# Patient Record
Sex: FEMALE | Race: WHITE | NOT HISPANIC OR LATINO | Employment: FULL TIME | URBAN - METROPOLITAN AREA
[De-identification: names, ages, dates, MRNs, and addresses within clinical notes are randomized per-mention and may not be internally consistent; named-entity substitution may affect disease eponyms.]

---

## 2018-02-07 ENCOUNTER — HOSPITAL ENCOUNTER (INPATIENT)
Facility: CLINIC | Age: 33
LOS: 2 days | Discharge: HOME | End: 2018-02-09
Attending: EMERGENCY MEDICINE | Admitting: PSYCHIATRY & NEUROLOGY
Payer: COMMERCIAL

## 2018-02-07 DIAGNOSIS — R45.851 SUICIDAL THOUGHTS: ICD-10-CM

## 2018-02-07 DIAGNOSIS — F33.2 SEVERE RECURRENT MAJOR DEPRESSION WITHOUT PSYCHOTIC FEATURES (H): Primary | ICD-10-CM

## 2018-02-07 PROBLEM — F32.A DEPRESSION: Status: ACTIVE | Noted: 2018-02-07

## 2018-02-07 LAB
ALBUMIN SERPL-MCNC: 3.9 G/DL (ref 3.4–5)
ALP SERPL-CCNC: 70 U/L (ref 40–150)
ALT SERPL W P-5'-P-CCNC: 24 U/L (ref 0–50)
AMPHETAMINES UR QL SCN: NEGATIVE
ANION GAP SERPL CALCULATED.3IONS-SCNC: 9 MMOL/L (ref 3–14)
APAP SERPL-MCNC: <2 MG/L (ref 10–20)
AST SERPL W P-5'-P-CCNC: 22 U/L (ref 0–45)
BARBITURATES UR QL: NEGATIVE
BASOPHILS # BLD AUTO: 0 10E9/L (ref 0–0.2)
BASOPHILS NFR BLD AUTO: 0.4 %
BENZODIAZ UR QL: NEGATIVE
BILIRUB SERPL-MCNC: 0.3 MG/DL (ref 0.2–1.3)
BUN SERPL-MCNC: 13 MG/DL (ref 7–30)
CALCIUM SERPL-MCNC: 9 MG/DL (ref 8.5–10.1)
CANNABINOIDS UR QL SCN: NEGATIVE
CHLORIDE SERPL-SCNC: 109 MMOL/L (ref 94–109)
CO2 SERPL-SCNC: 23 MMOL/L (ref 20–32)
COCAINE UR QL: NEGATIVE
CREAT SERPL-MCNC: 0.78 MG/DL (ref 0.52–1.04)
DIFFERENTIAL METHOD BLD: NORMAL
EOSINOPHIL # BLD AUTO: 0.1 10E9/L (ref 0–0.7)
EOSINOPHIL NFR BLD AUTO: 0.6 %
ERYTHROCYTE [DISTWIDTH] IN BLOOD BY AUTOMATED COUNT: 14.2 % (ref 10–15)
GFR SERPL CREATININE-BSD FRML MDRD: 84 ML/MIN/1.7M2
GLUCOSE SERPL-MCNC: 91 MG/DL (ref 70–99)
HCG UR QL: NEGATIVE
HCT VFR BLD AUTO: 38 % (ref 35–47)
HGB BLD-MCNC: 12.5 G/DL (ref 11.7–15.7)
IMM GRANULOCYTES # BLD: 0 10E9/L (ref 0–0.4)
IMM GRANULOCYTES NFR BLD: 0.1 %
LYMPHOCYTES # BLD AUTO: 1.3 10E9/L (ref 0.8–5.3)
LYMPHOCYTES NFR BLD AUTO: 14 %
MCH RBC QN AUTO: 29.3 PG (ref 26.5–33)
MCHC RBC AUTO-ENTMCNC: 32.9 G/DL (ref 31.5–36.5)
MCV RBC AUTO: 89 FL (ref 78–100)
MONOCYTES # BLD AUTO: 0.7 10E9/L (ref 0–1.3)
MONOCYTES NFR BLD AUTO: 6.9 %
NEUTROPHILS # BLD AUTO: 7.5 10E9/L (ref 1.6–8.3)
NEUTROPHILS NFR BLD AUTO: 78 %
NRBC # BLD AUTO: 0 10*3/UL
NRBC BLD AUTO-RTO: 0 /100
OPIATES UR QL SCN: NEGATIVE
PCP UR QL SCN: NEGATIVE
PLATELET # BLD AUTO: 317 10E9/L (ref 150–450)
POTASSIUM SERPL-SCNC: 3.9 MMOL/L (ref 3.4–5.3)
PROT SERPL-MCNC: 8 G/DL (ref 6.8–8.8)
RBC # BLD AUTO: 4.27 10E12/L (ref 3.8–5.2)
SALICYLATES SERPL-MCNC: <2 MG/DL
SODIUM SERPL-SCNC: 141 MMOL/L (ref 133–144)
WBC # BLD AUTO: 9.6 10E9/L (ref 4–11)

## 2018-02-07 PROCEDURE — 81025 URINE PREGNANCY TEST: CPT | Performed by: EMERGENCY MEDICINE

## 2018-02-07 PROCEDURE — 84443 ASSAY THYROID STIM HORMONE: CPT | Performed by: EMERGENCY MEDICINE

## 2018-02-07 PROCEDURE — 80307 DRUG TEST PRSMV CHEM ANLYZR: CPT | Performed by: EMERGENCY MEDICINE

## 2018-02-07 PROCEDURE — 80053 COMPREHEN METABOLIC PANEL: CPT | Performed by: EMERGENCY MEDICINE

## 2018-02-07 PROCEDURE — 80329 ANALGESICS NON-OPIOID 1 OR 2: CPT | Performed by: EMERGENCY MEDICINE

## 2018-02-07 PROCEDURE — 85025 COMPLETE CBC W/AUTO DIFF WBC: CPT | Performed by: EMERGENCY MEDICINE

## 2018-02-07 PROCEDURE — 90791 PSYCH DIAGNOSTIC EVALUATION: CPT

## 2018-02-07 PROCEDURE — 99285 EMERGENCY DEPT VISIT HI MDM: CPT | Mod: 25

## 2018-02-07 PROCEDURE — 12400006 ZZH R&B MH INTERMEDIATE

## 2018-02-07 RX ORDER — MECLIZINE HYDROCHLORIDE 25 MG/1
25 TABLET ORAL 3 TIMES DAILY PRN
Status: DISCONTINUED | OUTPATIENT
Start: 2018-02-07 | End: 2018-02-09 | Stop reason: HOSPADM

## 2018-02-07 RX ORDER — TRAZODONE HYDROCHLORIDE 100 MG/1
50-100 TABLET ORAL AT BEDTIME
COMMUNITY
Start: 2018-01-15 | End: 2018-02-07

## 2018-02-07 RX ORDER — HYDROXYZINE HYDROCHLORIDE 25 MG/1
25-50 TABLET, FILM COATED ORAL EVERY 4 HOURS PRN
Status: DISCONTINUED | OUTPATIENT
Start: 2018-02-07 | End: 2018-02-09 | Stop reason: HOSPADM

## 2018-02-07 RX ORDER — SERTRALINE HYDROCHLORIDE 100 MG/1
TABLET, FILM COATED ORAL
COMMUNITY
Start: 2018-01-15 | End: 2018-02-07

## 2018-02-07 RX ORDER — CETIRIZINE HYDROCHLORIDE 10 MG/1
10 TABLET ORAL DAILY PRN
Status: ON HOLD | COMMUNITY
End: 2019-01-21

## 2018-02-07 RX ORDER — ONDANSETRON 4 MG/1
4 TABLET, ORALLY DISINTEGRATING ORAL EVERY 8 HOURS PRN
Status: DISCONTINUED | OUTPATIENT
Start: 2018-02-07 | End: 2018-02-09 | Stop reason: HOSPADM

## 2018-02-07 RX ORDER — CETIRIZINE HYDROCHLORIDE 10 MG/1
10 TABLET ORAL DAILY PRN
Status: DISCONTINUED | OUTPATIENT
Start: 2018-02-07 | End: 2018-02-09 | Stop reason: HOSPADM

## 2018-02-07 ASSESSMENT — ENCOUNTER SYMPTOMS
FEVER: 0
DYSURIA: 0
NAUSEA: 0
VOMITING: 0
SEIZURES: 1
BRUISES/BLEEDS EASILY: 0

## 2018-02-07 NOTE — ED PROVIDER NOTES
"  History     Chief Complaint:  Depression and Suicidal    HPI   Misty Parham is a 32 year old female who presents after making comments to her physician about worthlessness and not wanting to be alive.  Patient reports that she overuses medications in attempt to harm herself.  She specifically reports over using Tylenol PM.  She states that she has been taking 4 tablets per day.  She also has thought about ethylene glycol ingestion but has not attempted this.  She reports being unable to contract for safety and continues to feel suicidal.    Allergies:  No known drug allergies     Medications:    Zoloft  Desyrel    Past Medical History:    Depression    Past Surgical History:    History reviewed. No pertinent surgical history.     Family History:    History reviewed. No pertinent family history.      Social History:  Marital Status:  Legally  [3]     Review of Systems   Constitutional: Negative for fever.   Gastrointestinal: Negative for nausea and vomiting.   Genitourinary: Negative for dysuria.   Neurological: Positive for seizures. Negative for syncope.   Hematological: Does not bruise/bleed easily.   Psychiatric/Behavioral: Positive for suicidal ideas.   10 point review of systems performed and is negative except as above and in HPI.      Physical Exam     Patient Vitals for the past 24 hrs:   BP Temp Temp src Heart Rate SpO2 Height Weight   02/07/18 1250 111/70 98.6  F (37  C) Oral 101 98 % 1.651 m (5' 5\") 108.9 kg (240 lb)        Physical Exam  General: Resting on the gurney, appears comfortable.  Appropriately groomed  Head:  The scalp, face, and head appear normal  Mouth/Throat: Mucus membranes are moist   CV:  Regular rate    Normal S1 and S2  No pathological murmur   Resp:  Breath sounds clear and equal bilaterally    Non-labored, no retractions or accessory muscle use    No coarseness    No wheezing   GI:  Abdomen is soft, no rigidity    No tenderness to palpation  MS:  No evidence of self " injury, no lacerations.  No evidence of trauma.  Skin:   No lacerations  Neuro:  Speech is normal.     No apparent focal deficit.      Uses all extremities equally.  Psych:  Awake. Alert. Flat affect, congruent with mood.      Appropriate interactions.    Suicidal thoughts.     No thoughts of harming others.    Denies hallucinations, does not appear to be responding to internal stimuli.      Emergency Department Course     Laboratory:  Labs Ordered and Resulted from Time of ED Arrival Up to the Time of Departure from the ED   CBC WITH PLATELETS DIFFERENTIAL   COMPREHENSIVE METABOLIC PANEL   ACETAMINOPHEN LEVEL   SALICYLATE LEVEL   DRUG ABUSE SCREEN 77 URINE (FL, RH, SH)   HCG QUALITATIVE URINE   All wnl    Emergency Department Course:  Pt seen and examined and seen by DEC.  Labs drawn.  Admitted to in psych.      Impression & Plan       Medical Decision Making:  Misty Parham is a 32 year old female who presents for evaluation of depression and suiciadal thoughts.  They have a history of previous psychiatric illness and at this point appear decompensated psychiatrically.  A 72 hour hold was  not placed and security watch initiated given the patient is willing to come in to the hospital voluntarily.   The patient has had increasing depression and thoughts of self harm.  They are currently a risk to themselves but not to others.      Although the patient has had increased suicidal thoughts, they have not had any actions of self harms other than taking 2 tylenol PM.  They no signs at this point of suicide attempt or ingestion, corroberated by the laboratory data above.      Plan will be admission to inpatient psychiatric care at [facility].     The patient was seen by DEC who agrees with this assessment.         Diagnosis:    ICD-10-CM    1. Suicidal thoughts R45.851        Disposition:  Admitted to psychiatric bed      Sakina Melton  2/7/2018    EMERGENCY DEPARTMENT       Sakina Melton MD  02/07/18 1922

## 2018-02-07 NOTE — IP AVS SNAPSHOT
MRN:[id number]                      After Visit Summary   2/7/2018    Misty Parham    MRN: [id number]           Thank you!     Thank you for choosing [facility] for your care. Our goal is always to provide you with excellent care.        Patient Information     Date Of Birth          1985        Designated Caregiver       Most Recent Value    Caregiver    Will someone help with your care after discharge? no      About your hospital stay     You were admitted on:  February 7, 2018 You last received care in the:  [facility]    You were discharged on:  February 9, 2018       Who to Call     For medical emergencies, please call 911.  For non-urgent questions about your medical care, please call your primary care provider or clinic, [phone]          Attending Provider     Provider Specialty    Sakina Melton MD Emergency Medicine    [facility]ry       Primary Care Provider Office Phone # Fax #    Monse Chen [phone]      Further instructions from your care team       Behavioral Discharge Planning and Instructions    Summary:  Admitted for worsening depression and suicidal ideation.     Main Diagnosis:  Major Depression, recurrent, severe, without psychotic features.     Major Treatments, Procedures and Findings:  Psychiatric assessment. Medication adjustment.     Symptoms to Report: Losing more sleep, Mood getting worse or Thoughts of suicide    Lifestyle Adjustment:  Follow all treatment recommendations, including completion of the intensive outpatient program. Develop and follow safety plan.     Psychiatry Follow-up:     You are being referred to the [facility] at [facility]. We were unable to schedule an intake appointment for you today, so please follow up on Monday to set up this appointment. Once you have done the initial intake appointment, you will start the program  the following day from 9:00 am until 12:00 pm.     [facility]  [address]  [place]  [address]  Phone:  [phone] / Fax:  [phone] / Admissions - [place]: [phone]    You have a follow up psychiatry appointment with Drew GARCIA at [facility] in [place] on Monday, February 26, 2018 at 12:20 pm. Please arrive at 11:50 am to complete paperwork. Please bring a photo ID and insurance card with you to this appointment.     [facility]  [address]  [place]  [address]  Phone:  [phone] / Fax:  [phone]    Resources:   Crisis Intervention: 261.519.8726 or 131-426-8842 (TTY: [phone]).  Call anytime for help.  National [place] on Mental Illness (www.[place].davian.org): 214.718.7938 or 374-350-9077.  National Suicide Prevention Line (www.mentalhealthmn.org): 749-102-XYGU (0301)  COPE (Crisis Services for Adults) in [facility]: [phone] (Available 24/7)    General Medication Instructions:   See your medication sheet(s) for instructions.   Take all medicines as directed.  Make no changes unless your doctor suggests them.   Go to all your doctor visits.  Be sure to have all your required lab tests. This way, your medicines can be refilled on time.  Do not use any drugs not prescribed by your doctor.  Avoid alcohol.      Pending Results     No orders found from 2/5/2018 to 2/8/2018.            Statement of Approval     Ordered          02/09/18 0935  I have reviewed and agree with all the recommendations and orders detailed in this document.  EFFECTIVE NOW     Approved and electronically signed by:  Zhang Madrigal MD             Admission Information     Date & Time Provider Department Dept. Phone    2/7/2018 Zhang Madrigal MD [facility] [phone]      Your Vitals Were     Blood Pressure Temperature Respirations Height Weight Pulse Oximetry    110/66 98  F (36.7  C) (Oral)  "16 1.651 m (5' 5\") 110.5 kg (243 lb 8 oz) 98%    BMI (Body Mass Index)                   40.52 kg/m2           BookFresh Information     BookFresh lets you send messages to your doctor, view your test results, renew your prescriptions, schedule appointments and more. To sign up, go to [email or web address] . Click on \"Log in\" on the left side of the screen, which will take you to the Welcome page. Then click on \"Sign up Now\" on the right side of the page.     You will be asked to enter the access code listed below, as well as some personal information. Please follow the directions to create your username and password.     Your access code is: [email or web address]  Expires: 5/10/2018 10:13 AM     Your access code will  in 90 days. If you need help or a new code, please call your [facility] clinic or [phone].        Care EveryWhere ID     This is your Care EveryWhere ID. This could be used by other organizations to access your [facility] medical records  [id number]        Equal Access to Services     KIRBY TEMPLE AH: Hadkunal Curtis, nisha burris, sharee davis, gina beckett . So [facility] [phone].    ATENCIÓN: Si habla español, tiene a lorenz disposición servicios gratuitos de asistencia lingüística. Llame al [phone].    We comply with applicable federal civil rights laws and [place] laws. We do not discriminate on the basis of race, color, national origin, age, disability, sex, sexual orientation, or gender identity.               Review of your medicines      START taking        Dose / Directions    sertraline 50 MG tablet   Commonly known as:  ZOLOFT   Used for:  Severe recurrent major depression without psychotic features (H)        Dose:  50 mg   Start taking on:  2/10/2018   Take 1 tablet (50 mg) by mouth daily   Quantity:  30 tablet   Refills:  0       traZODone 50 MG tablet   Commonly known as:  DESYREL   Used for:  Severe recurrent major depression " without psychotic features (H)        Dose:  50 mg   Take 1 tablet (50 mg) by mouth At Bedtime   Quantity:  30 tablet   Refills:  0         CONTINUE these medicines which have NOT CHANGED        Dose / Directions    cetirizine 10 MG tablet   Commonly known as:  zyrTEC        Dose:  10 mg   Take 10 mg by mouth daily as needed for allergies   Refills:  0       diphenhydrAMINE-acetaminophen  MG tablet   Commonly known as:  TYLENOL PM        Dose:  1-2 tablet   Take 1-2 tablets by mouth At Bedtime   Refills:  0       MECLIZINE HCL PO        Dose:  25 mg   Take 25 mg by mouth 3 times daily as needed for dizziness   Refills:  0       ZOFRAN ODT PO        Dose:  4 mg   Take 4 mg by mouth every 8 hours as needed for nausea   Refills:  0            Where to get your medicines      These medications were sent to [facility] - [address]  [address], [place] [address]     Phone:  [phone]     sertraline 50 MG tablet    traZODone 50 MG tablet                Protect others around you: Learn how to safely use, store and throw away your medicines at www.disposemymeds.org.             Medication List: This is a list of all your medications and when to take them. Check marks below indicate your daily home schedule. Keep this list as a reference.      Medications           Morning Afternoon Evening Bedtime As Needed    cetirizine 10 MG tablet   Commonly known as:  zyrTEC   Take 10 mg by mouth daily as needed for allergies                                   diphenhydrAMINE-acetaminophen  MG tablet   Commonly known as:  TYLENOL PM   Take 1-2 tablets by mouth At Bedtime                                   MECLIZINE HCL PO   Take 25 mg by mouth 3 times daily as needed for dizziness   Last time this was given:  25 mg on 2/9/2018 12:06 AM                                   sertraline 50 MG tablet   Commonly known as:  ZOLOFT   Take 1 tablet (50 mg) by mouth daily   Start taking  on:  2/10/2018   Last time this was given:  50 mg on 2/9/2018  9:29 AM                                   traZODone 50 MG tablet   Commonly known as:  DESYREL   Take 1 tablet (50 mg) by mouth At Bedtime   Last time this was given:  50 mg on 2/8/2018  9:29 PM                                   ZOFRAN ODT PO   Take 4 mg by mouth every 8 hours as needed for nausea   Last time this was given:  4 mg on 2/9/2018 12:06 AM                                             More Information        Depression: Tips to Help Yourself  As your healthcare providers help treat your depression, you can also help yourself. Keep in mind that your illness affects you emotionally, physically, mentally, and socially. So full recovery will take time. Take care of your body and your soul, and be patient with yourself as you get better.    Self-care    Educate yourself. Read about treatment and medicine options. If you have the energy, attend local conferences or support groups. Keep a list of useful websites and helpful books and use them as needed. This illness is not your fault. Don t blame yourself for your depression.    Manage early symptoms. If you notice symptoms returning, experience triggers, or identify other factors that may lead to a depressive episode, get help as soon as possible. Ask trusted friends and family to monitor your behavior and let you know if they see anything of concern.    Work with your provider. Find a provider you can trust. Communicate honestly with that person and share information on your treatment for depression and your reaction to medicines.    Be prepared for a crisis. Know what to do if you experience a crisis. Keep the phone number of a crisis hotline and know the location of your community's urgent care centers and the closest emergency department.    Hold off on big decisions. Depression can cloud your judgment. So wait until you feel better before making major life decisions, such as changing jobs,  moving, or getting  or .    Be patient. Recovering from depression is a process. Don t be discouraged if it takes some time to feel better.    Keep it simple. Depression saps your energy and concentration. So you won t be able to do all the things you used to do. Set small goals and do what you can.    Be with others. Don t isolate yourself--you ll only feel worse. Try to be with other people. And take part in fun activities when you can. Go to a movie, Saphogame, Gnosticist service, or social event. Talk openly with people you can trust. And accept help when it s offered.  Take care of your body  People with depression often lose the desire to take care of themselves. That only makes their problems worse. During treatment and afterward, make a point to:    Exercise. It s a great way to take care of your body. And studies have shown that exercise helps fight depression.    Avoid drugs and alcohol. These may ease the pain in the short term. But they ll only make your problems worse in the long run.    Get relief from stress. Ask your healthcare provider for relaxation exercises and techniques to help relieve stress.    Eat right. A balanced and healthy diet helps keep your body healthy.  Date Last Reviewed: 1/1/2017 2000-2017 [employer]. [address] [place] [address], [place] [address]. All rights reserved. This information is not intended as a substitute for professional medical care. Always follow your healthcare professional's instructions.

## 2018-02-07 NOTE — IP AVS SNAPSHOT
[place] [address] [place] [address]    Phone:  [phone]                                       After Visit Summary   2/7/2018    Misty Parham    MRN: [id number]           After Visit Summary Signature Page     I have received my discharge instructions, and my questions have been answered. I have discussed any challenges I see with this plan with the nurse or doctor.    ..........................................................................................................................................  Patient/Patient Representative Signature      ..........................................................................................................................................  Patient Representative Print Name and Relationship to Patient    ..................................................               ................................................  Date                                            Time    ..........................................................................................................................................  Reviewed by Signature/Title    ...................................................              ..............................................  Date                                                            Time

## 2018-02-07 NOTE — PHARMACY-ADMISSION MEDICATION HISTORY
Admission medication history interview status for the 2/7/2018  admission is complete. See EPIC admission navigator for prior to admission medications     Medication history source reliability:Moderate, pt interview and care everywhere    Actions taken by pharmacist (provider contacted, etc):patient says she never started taking Sertraline or Trazodone that was ordered for her     Additional medication history information not noted on PTA med list :ED note says pt takes 4 Tylenol PM a day    Medication reconciliation/reorder completed by provider prior to medication history? No    Time spent in this activity: 10 minutes    Prior to Admission medications    Medication Sig Last Dose Taking? Auth Provider   Ondansetron (ZOFRAN ODT PO) Take 4 mg by mouth every 8 hours as needed for nausea prn Yes Unknown, Entered By History   MECLIZINE HCL PO Take 25 mg by mouth 3 times daily as needed for dizziness prn Yes Unknown, Entered By History   diphenhydrAMINE-acetaminophen (TYLENOL PM)  MG tablet Take 1-2 tablets by mouth At Bedtime 2/6/2018 at hs Yes Unknown, Entered By History   cetirizine (ZYRTEC) 10 MG tablet Take 10 mg by mouth daily as needed for allergies prn Yes Unknown, Entered By History

## 2018-02-08 LAB
GLUCOSE BLDC GLUCOMTR-MCNC: 121 MG/DL (ref 70–99)
INTERPRETATION ECG - MUSE: NORMAL
TSH SERPL DL<=0.005 MIU/L-ACNC: 1.41 MU/L (ref 0.4–4)

## 2018-02-08 PROCEDURE — 93010 ELECTROCARDIOGRAM REPORT: CPT | Performed by: INTERNAL MEDICINE

## 2018-02-08 PROCEDURE — 93005 ELECTROCARDIOGRAM TRACING: CPT

## 2018-02-08 PROCEDURE — 25000132 ZZH RX MED GY IP 250 OP 250 PS 637: Performed by: PSYCHIATRY & NEUROLOGY

## 2018-02-08 PROCEDURE — 00000146 ZZHCL STATISTIC GLUCOSE BY METER IP

## 2018-02-08 PROCEDURE — 99223 1ST HOSP IP/OBS HIGH 75: CPT | Mod: AI | Performed by: NURSE PRACTITIONER

## 2018-02-08 PROCEDURE — 12400006 ZZH R&B MH INTERMEDIATE

## 2018-02-08 RX ORDER — TRAZODONE HYDROCHLORIDE 50 MG/1
50 TABLET, FILM COATED ORAL AT BEDTIME
Status: DISCONTINUED | OUTPATIENT
Start: 2018-02-08 | End: 2018-02-09 | Stop reason: HOSPADM

## 2018-02-08 RX ADMIN — SERTRALINE HYDROCHLORIDE 50 MG: 50 TABLET ORAL at 16:35

## 2018-02-08 RX ADMIN — TRAZODONE HYDROCHLORIDE 50 MG: 50 TABLET ORAL at 21:29

## 2018-02-08 NOTE — PLAN OF CARE
Problem: Depressive Symptoms  Goal: Depressive Symptoms  Signs and symptoms of listed problems will be absent or manageable.   Outcome: No Change  Blunt/flat affect, appears sad, quiet. Polite and cooperative when interacting with staff. Isolative to room for most of shift except to make or take phone calls. Declined groups. Denies SI

## 2018-02-08 NOTE — PROGRESS NOTES
.Welcome packet reviewed with patient. Information reviewed includes getting emergency help, preventing infections, understanding your care, using medication safely, reducing falls, preventing pressure ulcers, smoking cessation, powerful choices and Patients Bill of Rights. Pt. given tour of the unit and instruction on use of facility including emergency call light. Program schedule reviewed with patient. Questions regarding the unit addressed. Pt. Search completed and belongings inventoried.    ..Nursing assessment complete including patient and medication profiles. Risk assessments completed addressing suicide,fall,skin,nutrition and safety issues. Care plan initiated. Assessments reviewed with physician and admit orders received.

## 2018-02-08 NOTE — PROGRESS NOTES
"Reported feeling depressed for a very long time. She stopped taking her medications for almost a year ago.  She is  from her , he is not supportive of her taking medications.  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  Today, she sent a note to her Dr stating that \" life was not worth living and that she was feeling suicidal.    The clinic sent to do a welfare check.    She told Law Enforcement that she did not trust her self, if she went home.  Has thoughts of suicide but not a plan.  She is sad, flat affect, does not have any support system. Contracts for safety. Admission completed.,  "

## 2018-02-08 NOTE — H&P
"Admitted:     02/07/2018      PRIMARY CARE PROVIDER:  PARVIN Soto      CHIEF COMPLAINT:  Psychiatry history and physical.      HISTORY OF PRESENT ILLNESS:  Ms. Misty Parham is a 32-year-old female with past medical history significant for major depressive disorder, chronic insomnia and morbid obesity who initially presented on 2/7/2018 with suicidal ideation.  The patient reports that she feels worthless and not wanting to live and talks about having \"bad days.\"  The patient reports that she has been abusing Tylenol p.m. and ibuprofen for the past 1 to 2 years in an attempt to harm herself or to cope with her \"bad days.\"  The patient was very vague about how many pills she takes daily, but it appears to range anywhere from 4 to 8+ pills of Tylenol p.m. and she reports that she takes ibuprofen 2 tabs with each time of dosing.  The patient reports that she also takes meclizine anytime she takes this regimen.  The patient reports that she has intermittent nausea when she stops the noted medication regimen, most recently approximately 1 month ago.      Presently patient is seen in the psychiatry unit.  The patient appears saddened and has intermittent eye contact throughout conversation.  The patient currently reports no chest pain, shortness of breath, nausea, vomiting, diarrhea or constipation.  No recent fevers or chills.  Of note, the patient reports intermittent abdominal pain which she believes is secondary to the medication regimen that she currently takes.      The patient notes that she has a broken tooth on her bottom left jaw that she has an abscess associated with such.  The patient also reports some intermittent ear pain on the left side with no drainage noted intermittently correlating with the broken tooth.        PAST MEDICAL HISTORY:   1.  Major depressive disorder.   2.  Chronic insomnia.   3.  Morbid obesity.      PAST SURGICAL HISTORY:   Cholecystectomy.      SOCIAL HISTORY:   1.  Tobacco:  " None.   2.  Alcohol:  Approximately 1 time per month.   3.  Illicit drugs:  None.   4.  Currently lives with her parents in setting of being  from her .      FAMILY HISTORY:   1.  Father:  Diabetes, hyperlipidemia, hypertension.   2.  Maternal grandfather:  DVT, PE and diabetes.   3.  Maternal grandmother:  Breast and lung cancer.   4.  Paternal grandmother:  Myocardial infarction.      ALLERGIES:  No known drug allergies.      MEDICATIONS:    Prior to Admission medications    Medication Sig Last Dose Taking? Auth Provider   Ondansetron (ZOFRAN ODT PO) Take 4 mg by mouth every 8 hours as needed for nausea prn Yes Unknown, Entered By History   MECLIZINE HCL PO Take 25 mg by mouth 3 times daily as needed for dizziness prn Yes Unknown, Entered By History   diphenhydrAMINE-acetaminophen (TYLENOL PM)  MG tablet Take 1-2 tablets by mouth At Bedtime 2/6/2018 at hs Yes Unknown, Entered By History   cetirizine (ZYRTEC) 10 MG tablet Take 10 mg by mouth daily as needed for allergies prn Yes Unknown, Entered By History     REVIEW OF SYSTEMS:  A 10-point review of systems was completed.  All pertinent positives are noted in the HPI.  All other systems negative.      PHYSICAL EXAMINATION:     VITAL SIGNS:  Reviewed and are as follows:  Temperature 98.8, blood pressure 137/93, heart rate 85, respiratory rate 14, O2 sat 99% on room air.    CONSTITUTIONAL:  The patient is lying in bed on her left side, awake, alert, cooperative, in no apparent distress and appears stated age, obese.   HEENT:  Pupils equal, round and reactive to light.  Extraocular muscles intact.  Sclerae are clear.  Normocephalic, atraumatic.  Oropharynx with moist mucous membranes.  Broken tooth noted on left lower side with a small abscess noted.    NECK:  Supple, no adenopathy.  Normal range of motion.  No nuchal rigidity noted.    LUNGS:  No increased work of breathing.  Clear to auscultation bilaterally posteriorly.  No crackles or  "wheezing noted.   CARDIOVASCULAR:  Normal apical pulse, regular rate and rhythm, normal S1 and S2.  No murmur, rub or gallop noted.   ABDOMEN:  Normal bowel sounds, soft, nondistended.  Mild tenderness noted to palpation throughout.  No masses palpated.  No guarding or rebound tenderness noted.   EXTREMITIES:  Moves all four extremities.  Dorsalis pedis and radial pulses palpable bilaterally lower extremities with no edema.   NEUROLOGIC:  Awake, alert, oriented.  Cranial nerves II-XII are grossly intact.  Sensory is intact.  Speech is fluent.     SKIN:  Warm and dry.  No lesions or rash noted.  No erythema.   PSYCH:  Mentation appears normal and affect flattened.       LABORATORY DATA:  Reviewed.      ASSESSMENT AND PLAN:  Ms. Misty Parham is a 32-year-old female with past medical history significant for major depressive disorder, chronic insomnia and morbid obesity who presents today with worsening suicidal ideation with vague plan of overusing medications to cope.  The patient is admitted to psychiatry for further evaluation and management.      1.  Suicidal ideation with vague plan.    2.  Major depressive disorder.    3.  Chronic insomnia.   -Above diagnoses to be managed by primary service psychiatry.   -The patient reports approximately 1 year ago she stopped taking her prescribed Zoloft and trazodone because she was having a \"bad day.\"   - As noted in the HPI, the patient currently utilizes Tylenol p.m., ibuprofen and meclizine to help cope on a daily basis with the above diagnoses.     4.  Morbid obesity, BMI of 40.52.   -Encourage patient to follow up with PCP regarding further weight management as an outpatient.     Deep vein thrombosis prophylaxis.  -Low VTE risk; encourage the patient to get out of bed daily and ambulate throughout unit while awake.      CODE STATUS:  FULL CODE.       DISPOSITION:  Per primary service.      This patient was discussed with Dr. Luz Maria Lemons of the hospitalist service who " agrees with the current plan as outlined above.             TYESHA MÉNDEZ MD       As dictated by TASH RODGERS NP            D: 2018   T: 2018   MT: MARCOS      Name:     CHASITY PERKINS   MRN:      [id number]        Account:      [id number]   :      1985        Admitted:     2018                   Document: [id number]

## 2018-02-08 NOTE — H&P
"[facility] Psychiatric H&P Note       Initial History     The patient's care was discussed with the treatment team and chart notes were reviewed.     Patient examined for psychiatric admission.     IDENTIFICATION    Patient is a 32 year old   female with two children and one step child currently living in [place]. Pt sees PCP Monse Wilde at [facility]. She has not seen a psychiatrist for at least two years.    HISTORY OF PRESENT ILLNESS    Ms. Misty Parham is a 32 year old female with a PMHx significant for depression and insomnia who presented with worsening depression and suicidal ideation. She had a plan to overdose with Tylenol PM, she had been taking four tablets a day. She states that her ex- has been taking money away from her. Pt has severely depressed mood, motivation poor, concentration poor, low energy, hopeless, helpless, and worthless with SI, no plan, able to contract for safety. Pt is an anxious person, a worrier. Pt endorses consistent, pervasive sense of anxiety and worry. Pt finds this anxiety difficult to control, often resulting in restlessness, feeling on edge, irritability, and sleep disturbance.      CHEMICAL DEPENDENCY HISTORY    None reported. Utox is negative on admission.    PAST  PSYCHIATRIC HISTORY    Past medication trials include Zoloft and Trazodone.    FAMILY HISTORY    She believes that her mother had depression and had issues with alcohol. Her biological father emily had issues with mental illness and chemicals. Her maternal aunt likely has depression and chemical use as well. Her eldest son has started exhibiting the same symptoms as her. She states her family is \"anti-doctor.\"     SOCIAL HISTORY    Pt was raised as one of three children before her parents obtained a divorce. She has one adopted brother. She states her family was supportive. She graduated high school. She is living with her mother. 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 She is  to her  from [place] for 15 years and they have two biological children. She and her  are .     Hospital Course     On 2/8/18, pt was admitted for worsening depression with intent to overdose on medications. She will be restarted on Zoloft and Trazodone. She was recommended to attend [facility] at [facility] [place].     Medications     Prescriptions Prior to Admission   Medication Sig Dispense Refill Last Dose     Ondansetron (ZOFRAN ODT PO) Take 4 mg by mouth every 8 hours as needed for nausea   prn     MECLIZINE HCL PO Take 25 mg by mouth 3 times daily as needed for dizziness   prn     diphenhydrAMINE-acetaminophen (TYLENOL PM)  MG tablet Take 1-2 tablets by mouth At Bedtime   2/6/2018 at      cetirizine (ZYRTEC) 10 MG tablet Take 10 mg by mouth daily as needed for allergies   prn       Scheduled Medications:    PRNs:  hydrOXYzine, cetirizine, meclizine (ANTIVERT) tablet 25 mg, ondansetron, diphenhydramine-acetaminophen (TYLENOL PM) 50/1000      Allergies      No Known Allergies     Previous Medical History     Past Medical History:   Diagnosis Date     Depressive disorder         Medical Review of Systems     /56  Temp 98.7  F (37.1  C) (Oral)  Resp 16  Ht 1.651 m (5' 5\")  Wt 110.5 kg (243 lb 8 oz)  SpO2 99%  BMI 40.52 kg/m2  Body mass index is 40.52 kg/(m^2).    Previous 10-point ROS completed by WILLIAN Santos CNP on 2/8/18 reviewed by Zhang Madrigal MD on February 8, 2018 and is unchanged except for those problems mentioned within the HPI.     Mental Status Examination     Appearance Sitting in bed, dressed in scrubs. Appears stated age.   Attitude Cooperative   Orientation Oriented to person, place, time   Eye Contact Poor   Speech Regular rate, rhythm, volume and tone   Language Normal   Psychomotor Behavior Normal   Mood Depressed   Affect Flat   Thought Process Goal-Oriented, Intact "   Associations Intact   Thought Content Positive for SI on admission   Fund of Knowledge Average   Insight Fair   Judgement Fair   Attention Span & Concentration Alert   Recent & Remote Memory Intact   Gait Normal   Muscle Tone Intact      Labs     Labs reviewed.  Recent Results (from the past 24 hour(s))   CBC with platelets differential    Collection Time: 02/07/18  2:13 PM   Result Value Ref Range    WBC 9.6 4.0 - 11.0 10e9/L    RBC Count 4.27 3.8 - 5.2 10e12/L    Hemoglobin 12.5 11.7 - 15.7 g/dL    Hematocrit 38.0 35.0 - 47.0 %    MCV 89 78 - 100 fl    MCH 29.3 26.5 - 33.0 pg    MCHC 32.9 31.5 - 36.5 g/dL    RDW 14.2 10.0 - 15.0 %    Platelet Count 317 150 - 450 10e9/L    Diff Method Automated Method     % Neutrophils 78.0 %    % Lymphocytes 14.0 %    % Monocytes 6.9 %    % Eosinophils 0.6 %    % Basophils 0.4 %    % Immature Granulocytes 0.1 %    Nucleated RBCs 0 0 /100    Absolute Neutrophil 7.5 1.6 - 8.3 10e9/L    Absolute Lymphocytes 1.3 0.8 - 5.3 10e9/L    Absolute Monocytes 0.7 0.0 - 1.3 10e9/L    Absolute Eosinophils 0.1 0.0 - 0.7 10e9/L    Absolute Basophils 0.0 0.0 - 0.2 10e9/L    Abs Immature Granulocytes 0.0 0 - 0.4 10e9/L    Absolute Nucleated RBC 0.0    Comprehensive metabolic panel    Collection Time: 02/07/18  2:13 PM   Result Value Ref Range    Sodium 141 133 - 144 mmol/L    Potassium 3.9 3.4 - 5.3 mmol/L    Chloride 109 94 - 109 mmol/L    Carbon Dioxide 23 20 - 32 mmol/L    Anion Gap 9 3 - 14 mmol/L    Glucose 91 70 - 99 mg/dL    Urea Nitrogen 13 7 - 30 mg/dL    Creatinine 0.78 0.52 - 1.04 mg/dL    GFR Estimate 84 >60 mL/min/1.7m2    GFR Estimate If Black >90 >60 mL/min/1.7m2    Calcium 9.0 8.5 - 10.1 mg/dL    Bilirubin Total 0.3 0.2 - 1.3 mg/dL    Albumin 3.9 3.4 - 5.0 g/dL    Protein Total 8.0 6.8 - 8.8 g/dL    Alkaline Phosphatase 70 40 - 150 U/L    ALT 24 0 - 50 U/L    AST 22 0 - 45 U/L   Acetaminophen level    Collection Time: 02/07/18  2:13 PM   Result Value Ref Range    Acetaminophen  Level <2 mg/L   Salicylate level    Collection Time: 02/07/18  2:13 PM   Result Value Ref Range    Salicylate Level <2 mg/dL   TSH with free T4 reflex    Collection Time: 02/07/18  2:13 PM   Result Value Ref Range    TSH 1.41 0.40 - 4.00 mU/L   EKG 12-lead, tracing only    Collection Time: 02/08/18  7:37 AM   Result Value Ref Range    Interpretation ECG Click View Image link to view waveform and result           Impression     This is a 32 year old female with a history of depression and anxiety who presents with worsening depression and suicidal ideation. Discussed restarting pt on Zoloft. Will restart Zoloft 50mg qam and Trazodone 50mg qhs. Encouraged pt to attend IOP at [facility] to build a support network and develop methods to cope with pt's anxiety and depression. Pt acknowledged. She will need to send forms for FMLA. Dr. Madrigal encouraged pt to practice Freeze Frame Exercise -- to think of one specific extremely positive memory multiple times a day to preemptively keep pt from becoming more anxious and depressed.       Diagnoses     1. Major depression, recurrent, severe, without psychotic features.      Plan     1. Explained side effects, benefits, and complications of medications to the patient, Pt gave verbal consent.  2. Medication changes: Restart Zoloft 50mg qam and Trazodone 50mg qhs.  3. Discussed treatment plan with patient and team.  4. Projected length of stay: Likely discharge tomorrow.  5. IOP at [facility].        Attestation:   Patient has been seen and evaluated by me, Zhang Madrigal MD.    Patient ID:  Name: Misty Parham   MRN: [id number]  Admission: 2/7/2018   YOB: 1985

## 2018-02-08 NOTE — PROGRESS NOTES
02/07/18 1917   Patient Belongings   Did you bring any home meds/supplements to the hospital?  Yes   Disposition of meds  Sent to security/pharmacy per site process   Patient Belongings bracelet;cell phone/electronics;clothing;purse;wallet;other (see comments)   Disposition of Belongings Locker;Sent to security per site process   Belongings Search Yes   Clothing Search Yes   Second Staff yes   General Info Comment secured in patients locker     Cardigan  Light grey shirt  Black pants  Black winter jacket  Cellphone  Wired headphones  Blue lanyard    2 keys    1 car key  Brown leather purse    Chapstick x2    Handcream    Body oil tube    Business card bradshaw    Calendar    Miscellaneous paper    Notepad    Pens    Hand     Hair binders and hair scrunchi    A purple rock in a small black bag    2 metal  bracelets    Red wallet  o Reward cards  o Blue cross blue shield card  o Loose change  o [place]  s License  Security Envelope #[id number]    [employer] gift card #[id number]    AAA card #[id number]    [employer] card #[id number]    [employer] card #[id number]    Visa Debit #[id number]    [employer] Card #[id number]    [employer] gift card #[id number]    [employer] gift card #[id number]    [employer] [place] gift card #[id number]  Security Envelope #[id number]    Excedrin caplets in a bottle    Zofran tablets in a bottle    Meclizine tablets in a bottle        Admission:  I am responsible for any personal items that are not sent to the safe or pharmacy.  [facility] is not responsible for loss, theft or damage of any property in my possession.    Signature:  _________________________________ Date: _______  Time: _____                                              Staff Signature:  ____________________________ Date: ________  Time: _____      2nd Staff person, if patient is unable/unwilling to sign:    Signature: ________________________________ Date: ________  Time: _____     Discharge:  [facility] has returned all of my personal belongings:    Signature:  _________________________________ Date: ________  Time: _____                                          Staff Signature:  ____________________________ Date: ________  Time: _____

## 2018-02-09 VITALS
HEIGHT: 65 IN | BODY MASS INDEX: 40.57 KG/M2 | SYSTOLIC BLOOD PRESSURE: 110 MMHG | TEMPERATURE: 98 F | WEIGHT: 243.5 LBS | OXYGEN SATURATION: 98 % | DIASTOLIC BLOOD PRESSURE: 66 MMHG | RESPIRATION RATE: 16 BRPM

## 2018-02-09 PROCEDURE — 25000125 ZZHC RX 250: Performed by: PSYCHIATRY & NEUROLOGY

## 2018-02-09 PROCEDURE — 25000131 ZZH RX MED GY IP 250 OP 636 PS 637: Performed by: PSYCHIATRY & NEUROLOGY

## 2018-02-09 PROCEDURE — 25000132 ZZH RX MED GY IP 250 OP 250 PS 637: Performed by: PSYCHIATRY & NEUROLOGY

## 2018-02-09 PROCEDURE — 90853 GROUP PSYCHOTHERAPY: CPT

## 2018-02-09 RX ORDER — TRAZODONE HYDROCHLORIDE 50 MG/1
50 TABLET, FILM COATED ORAL AT BEDTIME
Qty: 30 TABLET | Refills: 0 | Status: SHIPPED | OUTPATIENT
Start: 2018-02-09 | End: 2018-03-22

## 2018-02-09 RX ADMIN — ONDANSETRON 4 MG: 4 TABLET, ORALLY DISINTEGRATING ORAL at 00:06

## 2018-02-09 RX ADMIN — MECLIZINE 25 MG: 25 TABLET ORAL at 00:06

## 2018-02-09 RX ADMIN — SERTRALINE HYDROCHLORIDE 50 MG: 50 TABLET ORAL at 09:29

## 2018-02-09 NOTE — PROGRESS NOTES
[facility] Psychiatric Progress Note       Interim History     The patient's care was discussed with the treatment team and chart notes were reviewed. Pt seen on SDU. Pt reported she experienced an episode of probably syncope with transient hypotension of 96/47 and fell on her knees while walking up to the nurse's station. She was started on Zoloft and Trazodone yesterday. Trazodone can certainly cause episodes of syncope, but it is more likely she was dehydrated as she had not been drinking much on the unit. She has not experienced an episode since then. BP was 110/66 this morning. She was encouraged to increase her fluid intake and to monitor if this occurs again so medications can be adjusted. Pt verbalized understanding. She is still concerned about her children seeing her  .  He does not provide money for their children and she does not want him to see the children. He sees them everyday and works as a . She would like to discharge today. She states she is feeling less depressed. Denies suicidal or homicidal ideation. 30 day supply of medication provided at time of discharge. She will attend IOP at [facility] and will see Drew Juarez NP on discharge.     Hospital Course     On 2/8/18, pt was admitted for worsening depression with intent to overdose on medications. She will be restarted on Zoloft and Trazodone. She was recommended to attend IOP at [facility]. On 2/9/18, pt was cleared for discharge. 30 day supply of medication provided at time of discharge. She will attend IOP at [facility] and follow up with Drew Juarez NP at [facility].     Medications     Current Facility-Administered Medications Ordered in Epic   Medication Dose Route Frequency Last Rate Last Dose     sertraline (ZOLOFT) tablet 50 mg  50 mg Oral Daily   50 mg at 02/08/18 9284     traZODone (DESYREL) tablet 50 mg  50 mg Oral At Bedtime   50 mg  "at 02/08/18 2129     hydrOXYzine (ATARAX) tablet 25-50 mg  25-50 mg Oral Q4H PRN         cetirizine (zyrTEC) tablet 10 mg  10 mg Oral Daily PRN         meclizine (ANTIVERT) tablet 25 mg  25 mg Oral TID PRN   25 mg at 02/09/18 0006     ondansetron (ZOFRAN-ODT) ODT tab 4 mg  4 mg Oral Q8H PRN   4 mg at 02/09/18 0006     diphenhydrAMINE (BENADRYL) 50 mg, acetaminophen (TYLENOL) 650 mg alternative for Tylenol PM   Oral At Bedtime PRN         No current Epic-ordered outpatient prescriptions on file.         Allergies      No Known Allergies     Medical Review of Systems     /66  Temp 98  F (36.7  C) (Oral)  Resp 16  Ht 1.651 m (5' 5\")  Wt 110.5 kg (243 lb 8 oz)  SpO2 98%  BMI 40.52 kg/m2  Body mass index is 40.52 kg/(m^2).  A 10-point review of systems was performed by Zhang Madrigal MD and is negative, no new findings.      Psychiatric Examination     Appearance Sitting in chair, dressed in scrubs. Appears stated age.   Attitude Cooperative   Orientation Oriented to person, place, time   Eye Contact Fair   Speech Regular rate, rhythm, lowered volume and tone   Language Normal   Psychomotor Behavior Normal   Mood Less depressed   Affect Broader range   Thought Process Goal-Oriented, Intact   Associations Intact   Thought Content Patient is currently negative for suicidal ideation, negative for plan or intent, able to contract no self harm and identify barriers to suicide.  Negative for obsessions, compulsions or psychosis.      Fund of Knowledge Average   Insight Fair   Judgement Improving   Attention Span & Concentration Alert   Recent & Remote Memory Intact   Gait Normal   Muscle Tone Intact        Labs     Labs reviewed.  Recent Results (from the past 24 hour(s))   Glucose by meter    Collection Time: 02/08/18 11:39 PM   Result Value Ref Range    Glucose 121 (H) 70 - 99 mg/dL        Impression      This is a 32 year old female with a history of depression and anxiety who presents with worsening " depression and suicidal ideation. Discussed restarting pt on Zoloft. Will restart Zoloft 50mg qam and Trazodone 50mg qhs. Encouraged pt to attend IOP at [facility] to build a support network and develop methods to cope with pt's anxiety and depression. Pt acknowledged. She will need to send forms for FMLA for IOP at [facility].      Diagnoses      1. Major depression, recurrent, severe, without psychotic features.       Plan      1. Explained side effects, benefits, and complications of medications to the patient, Pt gave verbal consent.  2. Medication changes: Continue current medications.  3. Discussed treatment plan with patient and team.  4. Projected length of stay: Pt to discharge today.  5. IOP at [facility].  6. 30 day supply of medication provided at time of discharge.   7. Pt to follow up with Drew Juarez at [facility].         Attestation:   Patient has been seen and evaluated by me, Zhang Madrigal MD.    Patient ID:  Name: Misty Parham   MRN: [id number]  Admission: 2/7/2018   YOB: 1985

## 2018-02-09 NOTE — PROGRESS NOTES
Patient discharged to home in apparently stable condition with all belongings.  Accompanied by her .  Patient denies any suicidal ideation.  All discharged teaching reviewed by RN on prior shift.

## 2018-02-09 NOTE — PLAN OF CARE
Problem: Depressive Symptoms  Goal: Depressive Symptoms  Signs and symptoms of listed problems will be absent or manageable.   Outcome: Improving  Patient presents with a flat and sad affect. Mood appears depressed. Spent first half of the shift in the lounge watching TV, but withdrawn and not social. She otherwise spent time isolative and withdrawn to her room. Possible discharge tomorrow to [facility].

## 2018-02-09 NOTE — DISCHARGE INSTRUCTIONS
Behavioral Discharge Planning and Instructions    Summary:  Admitted for worsening depression and suicidal ideation.     Main Diagnosis:  Major Depression, recurrent, severe, without psychotic features.     Major Treatments, Procedures and Findings:  Psychiatric assessment. Medication adjustment.     Symptoms to Report: Losing more sleep, Mood getting worse or Thoughts of suicide    Lifestyle Adjustment:  Follow all treatment recommendations, including completion of the intensive outpatient program. Develop and follow safety plan.     Psychiatry Follow-up:     You are being referred to the [facility] Intensive Outpatient Program at [facility]. We were unable to schedule an intake appointment for you today, so please follow up on Monday to set up this appointment. Once you have done the initial intake appointment, you will start the program the following day from 9:00 am until 12:00 pm.     [facility]  [address]  [place]  [address]  Phone:  [phone] / Fax:  [phone] / [facility]: [phone]    You have a follow up psychiatry appointment with Drew GARCIA at [facility] in [place] on Monday, February 26, 2018 at 12:20 pm. Please arrive at 11:50 am to complete paperwork. Please bring a photo ID and insurance card with you to this appointment.     [facility]  [address]  [place]  [address]  Phone:  [phone] / Fax:  [phone]    Resources:   Crisis Intervention: 994.671.5861 or 839-985-5073 (TTY: 750.498.7195).  Call anytime for help.  [place] on Mental Illness (www.[place].org): 405.727.4559 or 440-608-2192.  National Suicide Prevention Line (www.mentalhealthmn.org): 154-099-XAVL (3664)  COPE (Crisis Services for Adults) in [facility]: 765.915.9426 (Available 24/7)    General Medication Instructions:   See your medication sheet(s) for instructions.   Take all  medicines as directed.  Make no changes unless your doctor suggests them.   Go to all your doctor visits.  Be sure to have all your required lab tests. This way, your medicines can be refilled on time.  Do not use any drugs not prescribed by your doctor.  Avoid alcohol.

## 2018-02-09 NOTE — PROGRESS NOTES
met with patient to have her sign release of information forms for [facility] and [facility]. Patent is in agreement with the plan to go to the [facility] following discharge. She will follow with Drew GARCIA at [facility] [place] for medication management.  discussed the safety plan with her and she will complete it.

## 2018-02-09 NOTE — PROGRESS NOTES
Discharge teaching done. Pt denied SI. Pt verbalized understanding of medications and out pt f/u TX plan. Medication filled here and sent home with pt at discharge.  Belongings returned to pt at discharge. Pt is calling for a ride and will discharge when ride arrives to the unit.

## 2018-02-09 NOTE — PROGRESS NOTES
Pt came to the nurses station around 11:40pm and she said she was feeling nauseated, dizzy, and lightheaded.  Pt said she was about to faint and staff assisted her to the floor. VS were T-97.5, P-62, R-14, BP-96/47. The House MD was called.  Pt's BG was 121. She said she might be dehydrated.  Juice and water were given. Pt was assisted back to bed and the doctor examined her. Zofran and Meclizine were given.

## 2018-02-09 NOTE — SIGNIFICANT EVENT
"House MD note. Re: Patient fall.    32 year old female admitted to [facility] on 2-07 with suicide ideation and severe depression. She was started on new medications:  Zoloft 50 mg's, which she took at 16:35 hours today, and Trazodone 50 mg's, which she took at 21:29 hours. A short while ago the patient felt lightheaded and nauseated and walked to the  area. She says she fell down onto her knees while walking and stayed there briefly, and then got up and walked to the . While standing there, she again felt lightheaded and lowered herself to the floor. She denies LOC with either episode and denies hitting her head as well. She feels some soreness in her left shoulder area, but denies other injuries. Her initial BP while on the floor in front of the desk was 96/47. Blood glucose was 121. She was helped up and walked back to her room where another BP measurement was taken while she was sitting and it was 107/49. Her admission lab testing was wnl.     Exam: She has a flat affect but is cooperative.  /49 (BP Location: Right arm)  Temp 97.5  F (36.4  C) (Oral)  Resp 14  Ht 1.651 m (5' 5\")  Wt 110.5 kg (243 lb 8 oz)  SpO2 98%  BMI 40.52 kg/m2  Heent: Atraumatic, no ecchymosis or hematomas or abrasions noted  Neck: Non tender to palpation, no ecchymosis or abrasions  Cor: RRR, no m/r/g  Ext: Normal ROM of all extremities without evidence of deformity, injury or pain.    Assessment: Probable near syncopal episode secondary to transient hypotension. Patient fall with no apparent significant injury. Probable bruised left shoulder.    Plan:  Her baseline BP readings are usually around 100, she reports. I suspect the two new medications are implicated somewhat in her probable BP drop and nausea. Will give her Zofran for her nausea now and defer to Dr. Madrigal in the AM regarding the need for medication adjustment. X-rays not indicated in my opinion.     Simon Hampton MD  2/9/2018 12:18 AM      "
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
## 2018-03-22 ENCOUNTER — HOSPITAL ENCOUNTER (EMERGENCY)
Facility: CLINIC | Age: 33
Discharge: PSYCHIATRIC OR SUBSTANCE USE FACILITY | End: 2018-03-22
Attending: EMERGENCY MEDICINE | Admitting: EMERGENCY MEDICINE
Payer: COMMERCIAL

## 2018-03-22 ENCOUNTER — TRANSFERRED RECORDS (OUTPATIENT)
Dept: HEALTH INFORMATION MANAGEMENT | Facility: CLINIC | Age: 33
End: 2018-03-22

## 2018-03-22 ENCOUNTER — HOSPITAL ENCOUNTER (INPATIENT)
Facility: CLINIC | Age: 33
LOS: 5 days | Discharge: HOME | End: 2018-03-27
Attending: PSYCHIATRY & NEUROLOGY | Admitting: PSYCHIATRY & NEUROLOGY
Payer: COMMERCIAL

## 2018-03-22 VITALS
BODY MASS INDEX: 41.65 KG/M2 | WEIGHT: 250 LBS | HEIGHT: 65 IN | OXYGEN SATURATION: 97 % | RESPIRATION RATE: 18 BRPM | TEMPERATURE: 98.9 F | DIASTOLIC BLOOD PRESSURE: 74 MMHG | SYSTOLIC BLOOD PRESSURE: 119 MMHG

## 2018-03-22 DIAGNOSIS — F33.40 RECURRENT MAJOR DEPRESSIVE DISORDER, IN REMISSION (H): Primary | ICD-10-CM

## 2018-03-22 DIAGNOSIS — R45.851 SUICIDAL IDEATION: ICD-10-CM

## 2018-03-22 LAB
ALBUMIN SERPL-MCNC: 3.7 G/DL (ref 3.4–5)
ALP SERPL-CCNC: 86 U/L (ref 40–150)
ALT SERPL W P-5'-P-CCNC: 16 U/L (ref 0–50)
AMPHETAMINES UR QL SCN: NEGATIVE
APAP SERPL-MCNC: <2 MG/L (ref 10–20)
AST SERPL W P-5'-P-CCNC: 16 U/L (ref 0–45)
BARBITURATES UR QL: NEGATIVE
BENZODIAZ UR QL: NEGATIVE
BILIRUB DIRECT SERPL-MCNC: <0.1 MG/DL (ref 0–0.2)
BILIRUB SERPL-MCNC: 0.2 MG/DL (ref 0.2–1.3)
CANNABINOIDS UR QL SCN: NEGATIVE
COCAINE UR QL: NEGATIVE
HCG UR QL: NEGATIVE
OPIATES UR QL SCN: NEGATIVE
PCP UR QL SCN: NEGATIVE
PROT SERPL-MCNC: 8 G/DL (ref 6.8–8.8)

## 2018-03-22 PROCEDURE — 36415 COLL VENOUS BLD VENIPUNCTURE: CPT

## 2018-03-22 PROCEDURE — 80307 DRUG TEST PRSMV CHEM ANLYZR: CPT | Performed by: EMERGENCY MEDICINE

## 2018-03-22 PROCEDURE — 25000132 ZZH RX MED GY IP 250 OP 250 PS 637: Performed by: PSYCHIATRY & NEUROLOGY

## 2018-03-22 PROCEDURE — 12400007 ZZH R&B MH INTERMEDIATE UMMC

## 2018-03-22 PROCEDURE — 80076 HEPATIC FUNCTION PANEL: CPT | Performed by: EMERGENCY MEDICINE

## 2018-03-22 PROCEDURE — 99285 EMERGENCY DEPT VISIT HI MDM: CPT | Mod: 25

## 2018-03-22 PROCEDURE — 80329 ANALGESICS NON-OPIOID 1 OR 2: CPT | Performed by: EMERGENCY MEDICINE

## 2018-03-22 PROCEDURE — 81025 URINE PREGNANCY TEST: CPT | Performed by: EMERGENCY MEDICINE

## 2018-03-22 PROCEDURE — 90791 PSYCH DIAGNOSTIC EVALUATION: CPT

## 2018-03-22 RX ORDER — HYDROXYZINE HYDROCHLORIDE 25 MG/1
25 TABLET, FILM COATED ORAL EVERY 4 HOURS PRN
Status: DISCONTINUED | OUTPATIENT
Start: 2018-03-22 | End: 2018-03-27 | Stop reason: HOSPADM

## 2018-03-22 RX ORDER — ALUMINA, MAGNESIA, AND SIMETHICONE 2400; 2400; 240 MG/30ML; MG/30ML; MG/30ML
30 SUSPENSION ORAL EVERY 4 HOURS PRN
Status: DISCONTINUED | OUTPATIENT
Start: 2018-03-22 | End: 2018-03-27 | Stop reason: HOSPADM

## 2018-03-22 RX ORDER — MIRTAZAPINE 15 MG/1
7.5 TABLET, ORALLY DISINTEGRATING ORAL
Status: DISCONTINUED | OUTPATIENT
Start: 2018-03-22 | End: 2018-03-27 | Stop reason: HOSPADM

## 2018-03-22 RX ORDER — ACETAMINOPHEN 325 MG/1
650 TABLET ORAL EVERY 4 HOURS PRN
Status: DISCONTINUED | OUTPATIENT
Start: 2018-03-22 | End: 2018-03-27 | Stop reason: HOSPADM

## 2018-03-22 RX ORDER — BISACODYL 10 MG
10 SUPPOSITORY, RECTAL RECTAL DAILY PRN
Status: DISCONTINUED | OUTPATIENT
Start: 2018-03-22 | End: 2018-03-27 | Stop reason: HOSPADM

## 2018-03-22 RX ORDER — CETIRIZINE HYDROCHLORIDE 10 MG/1
10 TABLET ORAL DAILY PRN
Status: DISCONTINUED | OUTPATIENT
Start: 2018-03-22 | End: 2018-03-27 | Stop reason: HOSPADM

## 2018-03-22 RX ORDER — TRAZODONE HYDROCHLORIDE 100 MG/1
100 TABLET ORAL AT BEDTIME
Status: DISCONTINUED | OUTPATIENT
Start: 2018-03-22 | End: 2018-03-27 | Stop reason: HOSPADM

## 2018-03-22 RX ORDER — MECLIZINE HYDROCHLORIDE 25 MG/1
25 TABLET ORAL 3 TIMES DAILY PRN
Status: DISCONTINUED | OUTPATIENT
Start: 2018-03-22 | End: 2018-03-27 | Stop reason: HOSPADM

## 2018-03-22 RX ORDER — ONDANSETRON 4 MG/1
4 TABLET, ORALLY DISINTEGRATING ORAL EVERY 8 HOURS PRN
Status: DISCONTINUED | OUTPATIENT
Start: 2018-03-22 | End: 2018-03-27 | Stop reason: HOSPADM

## 2018-03-22 RX ADMIN — TRAZODONE HYDROCHLORIDE 100 MG: 100 TABLET ORAL at 21:18

## 2018-03-22 ASSESSMENT — ACTIVITIES OF DAILY LIVING (ADL)
ORAL_HYGIENE: INDEPENDENT
DRESS: INDEPENDENT
LAUNDRY: WITH SUPERVISION
GROOMING: INDEPENDENT

## 2018-03-22 ASSESSMENT — ENCOUNTER SYMPTOMS
SLEEP DISTURBANCE: 1
HALLUCINATIONS: 0

## 2018-03-22 NOTE — ED PROVIDER NOTES
"  History     Chief Complaint:  Suicidal ideation    HPI   Misty Parham is a 32 year old female who presents with suicidal ideation. The patient was recently discharged from a long term inpatient treatment and has since been having suicidal ideation. She states that she has a plan that she would drink antifreeze. She endorses having taken 4 Tylenol PM on Saturday, Sunday, and Tuesday this week, not to harm herself, but to help her sleep. She has not been taking her medications. She has not been working and reports eating too much and having some difficulty sleeping during this time. Today the patient went to her psych clinic and was placed on a 72 hour hold prior to being brought to the emergency department for eventual admission. She denies experiencing any hallucinations. The patient has no history of suicide attempt.    Allergies:  No Known Drug Allergies       Medications:    Zoloft  Trazone  Zyrtec    Past Medical History:    Depressive disorder    Past Surgical History:    History reviewed. No pertinent past surgical history.     Family History:    The patient denies any relevant family medical history.     Social History:  Marital Status:  Legally  [3]    Review of Systems   Psychiatric/Behavioral: Positive for sleep disturbance and suicidal ideas. Negative for hallucinations and self-injury.   All other systems reviewed and are negative.    Physical Exam   Vitals:  Patient Vitals for the past 24 hrs:   BP Temp Temp src Heart Rate Resp SpO2 Height Weight   03/22/18 1303 125/78 98.9  F (37.2  C) Oral 74 18 97 % 1.651 m (5' 5\") 113.4 kg (250 lb)        Physical Exam  Nursing note and vitals reviewed.    Constitutional:  Appears well-developed and well-nourished, comfortable. Obese.   HENT:    No evidence of facial or scalp trauma.      Nose normal.  No discharge.  Mucous membranes are moist.  Eyes:    Conjunctivae are normal without injection. No lid droop.     Pupils are equal, round.      Right eye " exhibits no discharge. Left eye exhibits no discharge.      No scleral icterus.  Lymph:  No enlarged or tender cervical or submandibular lymph nodes.   Cardiovascular:  Normal rate, regular rhythm with normal S1 and S2.      Normal heart sounds and peripheral pulses 2+ and equal.       No murmur or bowen.  Pulmonary:  Effort normal and breath sounds clear to auscultation bilaterally. No respiratory distress.  No stridor.     No wheezes. No rales. No chest wall tenderness.    GI:    Soft. No distension and no mass. No tenderness.      No rebound and no guarding. No flank pain.  No HSM.  Neurological:   Alert and oriented. No cranial nerve deficit, no facial droop.     Exhibits good muscle tone. Coordination normal. Gait is steady.     GCS eye subscore is 4. GCS verbal subscore is 5.      GCS motor subscore is 6.   Skin:    Skin is warm and dry. No rash noted. No diaphoresis.      No erythema. No pallor.  No lesions.  Psychiatric:   Poor eye contact, soft voice.  Admits to suicidal thoughts.     Judgment and thought content normal.      Emergency Department Course     Laboratory:  Laboratory findings were communicated with the patient who voiced understanding of the findings.  HCG qualitative: Negative  Drug abuse screen: Negative  Hepatic panel: Pending    Emergency Department Course:  Nursing notes and vitals reviewed.  I performed an exam of the patient as documented above.   The patient provided a urine sample here in the emergency department. This was sent for laboratory testing, findings above.     The patient was seen by our DEC     I discussed the treatment plan with the patient. She expressed understanding of this plan and consented to admission.     I personally reviewed the laboratory results with the patient and answered all related questions prior to admission.    Impression & Plan      Medical Decision Making:  Patient comes in with suicidal ideation.  She is a patient well-known in the system  with her history of mental health issues.  Patient wants to drink antifreeze.  I do not feel she is safe.  She needs to be on a hold which was written by her psychiatrist who wants her inpatient.  DEC saw the patient and agrees and are awaiting a bed.  The patient does state she takes 3-4 Tylenol PM's at bedtime to help her sleep.  She states she does not take it to harm herself.  Her drug abuse screen is negative pregnancy test is negative.  The psychiatrist wanted a liver panel because of the history of Tylenol use and and I have ordered this along with a Tylenol level and they are pending.  I will have my partner Dr. Burr follow-up the results of the testing.  No history of recent alcohol abuse.    Diagnosis:    ICD-10-CM    1. Suicidal ideation R45.851 HCG qualitative urine (UPT)     Drug abuse screen 77 urine ([place], , )      Disposition:   Admitted to mental health inpatient unit.  Tylenol level and liver panel are pending.  She is on a 72 hour hold placed by the psychiatrist in the clinic.    CMS Diagnoses: None     Scribe Disclosure:  I, Viktor Schwartz, am serving as a scribe at 1:06 PM on 3/22/2018 to document services personally performed by Sonia Gautam MD, based on my observations and the provider's statements to me.    EMERGENCY DEPARTMENT       Sonia Gautam MD  03/22/18 4469

## 2018-03-22 NOTE — ED NOTES
Bed: ED16  Expected date:   Expected time:   Means of arrival:   Comments:  [place] - 32F psych eval eta 1245

## 2018-03-22 NOTE — PHARMACY-ADMISSION MEDICATION HISTORY
Admission medication history interview status for the 3/22/2018  admission is complete. See EPIC admission navigator for prior to admission medications     Medication history source reliability:Good    Actions taken by pharmacist (provider contacted, etc): spoke with patient     Additional medication history information not noted on PTA med list :None    Medication reconciliation/reorder completed by provider prior to medication history? No    Time spent in this activity: 10 mins    Prior to Admission medications    Medication Sig Last Dose Taking? Auth Provider   Sertraline HCl (ZOLOFT PO) Take 150 mg by mouth daily 3/22/2018 at Unknown time Yes Unknown, Entered By History   TRAZODONE HCL PO Take 100 mg by mouth At Bedtime 3/21/2018 at Unknown time Yes Unknown, Entered By History   Ondansetron (ZOFRAN ODT PO) Take 4 mg by mouth every 8 hours as needed for nausea prn Yes Unknown, Entered By History   MECLIZINE HCL PO Take 25 mg by mouth 3 times daily as needed for dizziness prn Yes Unknown, Entered By History   cetirizine (ZYRTEC) 10 MG tablet Take 10 mg by mouth daily as needed for allergies prn Yes Unknown, Entered By History   Vilazodone HCl (VIIBRYD PO) Pt states she was just prescribed this (starter pack) but hasn't started it yet.   Unknown, Entered By History     Azra Hernandez, PharmD

## 2018-03-22 NOTE — IP AVS SNAPSHOT
[address]    [address]    [place] [address]    Phone:  [phone]                                       After Visit Summary   3/22/2018    Misty Parham    MRN: [id number]           After Visit Summary Signature Page     I have received my discharge instructions, and my questions have been answered. I have discussed any challenges I see with this plan with the nurse or doctor.    ..........................................................................................................................................  Patient/Patient Representative Signature      ..........................................................................................................................................  Patient Representative Print Name and Relationship to Patient    ..................................................               ................................................  Date                                            Time    ..........................................................................................................................................  Reviewed by Signature/Title    ...................................................              ..............................................  Date                                                            Time

## 2018-03-22 NOTE — IP AVS SNAPSHOT
MRN:[id number]                      After Visit Summary   3/22/2018    Misty Parham    MRN: [id number]           Thank you!     Thank you for choosing [facility] for your care. Our goal is always to provide you with excellent care.        Patient Information     Date Of Birth          1985        About your hospital stay     You were admitted on:  March 22, 2018 You last received care in the:  UR 30NR    You were discharged on:  March 27, 2018       Who to Call     For medical emergencies, please call 911.  For non-urgent questions about your medical care, please call your primary care provider or clinic, [phone]          Attending Provider     Provider Gerardo Victor MD Psychiatry       Primary Care Provider Office Phone # Fax #    Monse Chen [phone]      Further instructions from your care team        Behavioral Discharge Planning and Instructions      Summary:  You were admitted on 3/22/2018  due to suicidal ideation.  You were treated by Dr Gerardo Madden MD and discharged on 03/27/2018 from [facility] to Home    Principal Diagnosis:   Major Depressive Disorder, recurrent, severe without psychosis  Generalized Anxiety Disorder     Health Care Follow-up Appointments:   1. Psychiatry Appointment:  Date/Time: Thursday, March 29th, 2018 @ 12:40pm  Provider: [facility]  [address]  [address]  [place] [address]  Phone: [phone]  The Health unit Coordinator has faxed these discharge instructions to Fax: [phone].    2. Individual Therapy Appointment:  Date/Time: Tuesday, April 3rd, 2018 @ 8:00am  Provider: Cecelia Bethea MA, [facility]  [address]  [address]  [place] [address]  Phone: [phone]  The Health unit Coordinator has faxed these discharge instructions to Fax: [phone].    3. Case Management Services:  You were referred to [facility] for adult mental health case management services.  If you have not heard from someone from that office please feel free to contact them directly to see about the referral.  The number for the [facility] is [phone].    Attend all scheduled appointments with your outpatient providers. Call at least 24 hours in advance if you need to reschedule an appointment to ensure continued access to your outpatient providers.   Major Treatments, Procedures and Findings:  You were provided with: a psychiatric assessment, assessed for medical stability, medication evaluation and/or management, group therapy, individual therapy, milieu management and medical interventions    Symptoms to Report: feeling more aggressive, increased confusion, losing more sleep, mood getting worse or thoughts of suicide    Early warning signs can include: increased depression or anxiety sleep disturbances increased thoughts or behaviors of suicide or self-harm  increased unusual thinking, such as paranoia or hearing voices    Safety and Wellness:  Take all medicines as directed.  Make no changes unless your doctor suggests them.      Follow treatment recommendations.  Refrain from alcohol and non-prescribed drugs.  If there is a concern for safety, call 911.    Resources:   COPE (Community Outreach for Psychiatric Emergencies): [phone].Available 24-hours a day, 7 days a week. Call a COPE professional when a severe disturbance of mood or thinking is impacting your safety. COPE professionals are available to go where you are, handle an immediate crisis, and provide a clinical assessment. If needed, COPE will arrange an emergency evaluation for inpatient psychiatric services. Telephone consultations are also available. Ask them if you meet criteria for a crisis residence.   *You can also talk to COPE about crisis stabilization services if you are experiencing difficulty with the transition from the hospital.  [facility]  [facility]  Acute Psychiatric Services/Crisis Intervention: [phone]  24-hour walk-in crisis intervention and treatment of behavioral emergencies    Located at:  o [address] [facility] -  in the Emergency room on the first floor of the [facility]  [facility] [place] [address]  Phone: [phone]  This is a crisis residence where you can stay for a short time if you feel like you need to stabilize but do not need to go to the hospital.    Crisis Connection 24/7 Community Call Center: [phone]  Phone: [phone] outside the [facility] area: [phone]  www.crisis.org   Hours: 24 hours/day, 7 days/week  Services: Free and confidential telephone counseling provided by trained volunteers supervised by professional staff. Early intervention and brief supportive counseling for callers with issues of depression, stress and anxiety, domestic violence, parent/child conflicts, family issues, loneliness, grief and loss, suicidal ideation and chronic mental illness.    [facility]  You and your family would benefit from the support groups at [facility].   Www.namihelps.org    www.[facility].org    The [facility] has a parent support and educator staff - Vern Brock - that can be a support for your parents. There are also many classes that are available to you and your family.  Vern can be reached at [phone] xt [phone] or through e-mail at [email or web address].    Misty,  please take care and make your recovery a daily recovery. The treatment team has appreciated the opportunity to work with you.   If you have any questions or concerns our unit number is [phone].  You may be receiving a follow-up phone call within the next three days from a representative from behavioral health.  You have identified the best phone number to reach  "you as [phone].          Pending Results     No orders found from 3/20/2018 to 3/23/2018.            Admission Information     Date & Time Provider Department Dept. Phone    3/22/2018 Gerardo Madden MD UR 30NR [phone]      Your Vitals Were     Blood Pressure Pulse Temperature Respirations Height Weight    107/70 92 96.9  F (36.1  C) (Oral) 16 1.626 m (5' 4\") 108 kg (238 lb)    Pulse Oximetry BMI (Body Mass Index)                99% 40.85 kg/m2          MyChart Information     [employer] lets you send messages to your doctor, view your test results, renew your prescriptions, schedule appointments and more. To sign up, go to [email or web address] . Click on \"Log in\" on the left side of the screen, which will take you to the Welcome page. Then click on \"Sign up Now\" on the right side of the page.     You will be asked to enter the access code listed below, as well as some personal information. Please follow the directions to create your username and password.     Your access code is: [email or web address]  Expires: 5/10/2018 11:13 AM     Your access code will  in 90 days. If you need help or a new code, please call your [place] clinic or [phone].        Care EveryWhere ID     This is your Care EveryWhere ID. This could be used by other organizations to access your [place] medical records  [id number]        Equal Access to Services     [facility] Center[facility]AL[facility] AH: Hadii jenna nowak hadasho Soomaali, waaxda luqadaha, qaybta kaalmada adeegyada, gina beckett . So [facility] [phone].    ATENCIÓN: Si habla español, tiene a lorenz disposición servicios gratuitos de asistencia lingüística. Llame al [phone].    We comply with applicable federal civil rights laws and [place] laws. We do not discriminate on the basis of race, color, national origin, age, disability, sex, sexual orientation, or gender identity.               Review of your medicines      START taking        Dose / Directions    " ARIPiprazole 10 MG tablet   Commonly known as:  ABILIFY   Used for:  Recurrent major depressive disorder, in remission (H)        Dose:  10 mg   Start taking on:  3/28/2018   Take 1 tablet (10 mg) by mouth daily   Quantity:  30 tablet   Refills:  1       mirtazapine 15 MG tablet   Commonly known as:  REMERON   Used for:  Recurrent major depressive disorder, in remission (H)        Dose:  7.5 mg   Take 0.5 tablets (7.5 mg) by mouth At Bedtime   Quantity:  15 tablet   Refills:  1         CONTINUE these medicines which have NOT CHANGED        Dose / Directions    cetirizine 10 MG tablet   Commonly known as:  zyrTEC        Dose:  10 mg   Take 10 mg by mouth daily as needed for allergies   Refills:  0       MECLIZINE HCL PO        Dose:  25 mg   Take 25 mg by mouth 3 times daily as needed for dizziness   Refills:  0       TRAZODONE HCL PO        Dose:  100 mg   Take 100 mg by mouth At Bedtime   Refills:  0       ZOFRAN ODT PO        Dose:  4 mg   Take 4 mg by mouth every 8 hours as needed for nausea   Refills:  0       ZOLOFT PO        Dose:  150 mg   Take 150 mg by mouth daily   Refills:  0         STOP taking     VIIBRYD PO                Where to get your medicines      These medications were sent to [facility] [place], [place] - [address]  [address] [address]     Phone:  [phone]     ARIPiprazole 10 MG tablet    mirtazapine 15 MG tablet                Protect others around you: Learn how to safely use, store and throw away your medicines at www.disposemymeds.org.             Medication List: This is a list of all your medications and when to take them. Check marks below indicate your daily home schedule. Keep this list as a reference.      Medications           Morning Afternoon Evening Bedtime As Needed    ARIPiprazole 10 MG tablet   Commonly known as:  ABILIFY   Take 1 tablet (10 mg) by mouth daily   Start taking on:  3/28/2018   Last time this was given:  10 mg  on 3/27/2018  8:09 AM                                cetirizine 10 MG tablet   Commonly known as:  zyrTEC   Take 10 mg by mouth daily as needed for allergies                                MECLIZINE HCL PO   Take 25 mg by mouth 3 times daily as needed for dizziness                                mirtazapine 15 MG tablet   Commonly known as:  REMERON   Take 0.5 tablets (7.5 mg) by mouth At Bedtime                                TRAZODONE HCL PO   Take 100 mg by mouth At Bedtime   Last time this was given:  100 mg on 3/26/2018  8:47 PM                                ZOFRAN ODT PO   Take 4 mg by mouth every 8 hours as needed for nausea                                ZOLOFT PO   Take 150 mg by mouth daily   Last time this was given:  150 mg on 3/27/2018  8:08 AM

## 2018-03-23 PROCEDURE — 99222 1ST HOSP IP/OBS MODERATE 55: CPT | Mod: AI | Performed by: PSYCHIATRY & NEUROLOGY

## 2018-03-23 PROCEDURE — 25000132 ZZH RX MED GY IP 250 OP 250 PS 637: Performed by: PSYCHIATRY & NEUROLOGY

## 2018-03-23 PROCEDURE — 99207 ZZC CDG-CODE INCORRECT PER BILLING BASED ON TIME: CPT | Performed by: PSYCHIATRY & NEUROLOGY

## 2018-03-23 PROCEDURE — 12400007 ZZH R&B MH INTERMEDIATE UMMC

## 2018-03-23 RX ORDER — ARIPIPRAZOLE 10 MG/1
10 TABLET ORAL DAILY
Status: DISCONTINUED | OUTPATIENT
Start: 2018-03-25 | End: 2018-03-27 | Stop reason: HOSPADM

## 2018-03-23 RX ORDER — ARIPIPRAZOLE 5 MG/1
5 TABLET ORAL DAILY
Status: COMPLETED | OUTPATIENT
Start: 2018-03-24 | End: 2018-03-24

## 2018-03-23 RX ADMIN — SERTRALINE HYDROCHLORIDE 150 MG: 100 TABLET ORAL at 08:41

## 2018-03-23 ASSESSMENT — ACTIVITIES OF DAILY LIVING (ADL)
DRESS: INDEPENDENT
LAUNDRY: WITH SUPERVISION
ORAL_HYGIENE: INDEPENDENT
GROOMING: INDEPENDENT

## 2018-03-23 NOTE — PLAN OF CARE
Problem: General Plan of Care (Inpatient Behavioral)  Goal: Team Discussion  Team Plan:  Outcome: No Change  BEHAVIORAL TEAM DISCUSSION    Participants: Gerardo Madden MD; ÓSCAR Lujan, [facility]; Mariann Rojas RN  Progress:new admission  Continued Stay Criteria/Rationale: suicidal ideation and significant depressive symptoms.  Medical/Physical: no acute issues.   Precautions:   Behavioral Orders   Procedures     Code 1 - Restrict to Unit     Routine Programming     As clinically indicated     Status 15     Every 15 minutes.     Suicide precautions     Patients on Suicide Precautions should have a Combination Diet ordered that includes a Diet selection(s) AND a Behavioral Tray selection for Safe Tray - with utensils, or Safe Tray - NO utensils       Plan: 72 hour hold.  Assess medications and make changes as seen fit.  Pt would benefit from a CM referral or [facility] referral.    Rationale for change in precautions or plan: new admission.

## 2018-03-23 NOTE — PROGRESS NOTES
03/23/18 1515   Behavioral Health   Hallucinations denies / not responding to hallucinations   Thinking poor concentration;distractable   Orientation person: oriented;place: oriented;date: oriented;time: oriented   Memory baseline memory   Insight poor   Judgement impaired   Eye Contact at examiner   Affect blunted, flat;tense;sad   Mood depressed;hopeless   Physical Appearance/Attire other (see comment)  (fair)   Hygiene neglected grooming - unclean body, hair, teeth   Suicidality other (see comments)  (denies)   1. Wish to be Dead No   2. Non-Specific Active Suicidal Thoughts  No   Self Injury other (see comment)  (denies)   Elopement Hallucinations directing behavior  (nothing stated or observed)   Activity isolative;withdrawn;other (see comment)  (resting in bed most of the shift, skipped meals, no ADLS)   Speech clear;coherent;other (see comments)  (quiet)   Medication Sensitivity no stated side effects;no observed side effects   Psychomotor / Gait balanced;steady   Activities of Daily Living   Hygiene/Grooming independent   Oral Hygiene independent   Dress independent   Laundry with supervision   Room Organization independent

## 2018-03-23 NOTE — PROGRESS NOTES
Pt was admitted to [facility] under a 72-hr hold d/t suicidal ideations w/plan to drink anti-freeze. Pt arrived around 7 pm from [facility]. Pt presents with a blunt affect. Pt was calm, cooperative, and pleasant upon approach, during search and interview. Pt contracts for safety on the unit. Pt took her scheduled trazodone and went to bed. UTOX was negative. VS have been stable.
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
## 2018-03-23 NOTE — PROGRESS NOTES
03/22/18 1941   Patient Belongings   Did you bring any home meds/supplements to the hospital?  No   Patient Belongings cell phone/electronics;clothing;keys;money (see comment);shoes;other (see comments);wallet   Disposition of Belongings Locker   Belongings Search Yes   Clothing Search Yes   Second Staff Miladis FLORESJersey  32N       Locker #11: black coat, glasses, brown purse, black phone, white binder, hair tie, purple shoes, white headphones, white wallet, red wallet, keys     Security: change in a med bottle, loose change, (2) $1.00 bills, [employer] #[id number], [employer] #[id number]    A               Admission:  I am responsible for any personal items that are not sent to the safe or pharmacy.  [facility] is not responsible for loss, theft or damage of any property in my possession.    Signature:  _________________________________ Date: _______  Time: _____                                              Staff Signature:  ____________________________ Date: ________  Time: _____      2nd Staff person, if patient is unable/unwilling to sign:    Signature: ________________________________ Date: ________  Time: _____     Discharge:  [facility] has returned all of my personal belongings:    Signature: _________________________________ Date: ________  Time: _____                                          Staff Signature:  ____________________________ Date: ________  Time: _____

## 2018-03-24 PROCEDURE — 12400007 ZZH R&B MH INTERMEDIATE UMMC

## 2018-03-24 PROCEDURE — 25000132 ZZH RX MED GY IP 250 OP 250 PS 637: Performed by: PSYCHIATRY & NEUROLOGY

## 2018-03-24 RX ADMIN — ARIPIPRAZOLE 5 MG: 5 TABLET ORAL at 08:25

## 2018-03-24 RX ADMIN — SERTRALINE HYDROCHLORIDE 150 MG: 100 TABLET ORAL at 08:25

## 2018-03-24 RX ADMIN — TRAZODONE HYDROCHLORIDE 100 MG: 100 TABLET ORAL at 21:29

## 2018-03-24 ASSESSMENT — ACTIVITIES OF DAILY LIVING (ADL)
GROOMING: PROMPTS
DRESS: STREET CLOTHES;SCRUBS (BEHAVIORAL HEALTH);PROMPTS
DRESS: INDEPENDENT
ORAL_HYGIENE: PROMPTS
ORAL_HYGIENE: INDEPENDENT
GROOMING: INDEPENDENT
LAUNDRY: UNABLE TO COMPLETE
LAUNDRY: WITH SUPERVISION

## 2018-03-24 NOTE — H&P
"[facility] [place], [place]   Psychiatric History & Physical  Admission date: 3/22/2018        Chief Complaint:   \"I'm still pretty depressed right now\"         HPI:     Misty is a 32 year old female with history of Major Depressive Disorder, Anxiety Disorder, Borderline Personality Disorder traits, who was admitted on a 72 hour hold for worsening suicidal ideation and plan. She was recently hospitalized at [facility] in 2/2018 for suicidal ideation and was started on Zoloft, Trazodone, and was recommended to start Viibryd at somepoint. She mentions that she' had continued trouble with depression, with fractured sleep, low moods, fatigue, poor concentration, and recurrent suicidal thoughts with plans. Her plan recently was to drink antifreeze. At one point she was thinking that it would be easier to shoot herself rather than go through with a slow death (like from an overdose); of note, she denies owning a firearm. She mentions she takes 4 pills of Tylenol  mg per night to help her sleep, and sometimes takes 4 more pills (for a 4 grams). She denies abdominal pain, nausea, vomiting. Of importance, her hepatic panel, and acetaminophen was unremarkable. She mentions that she has been compliant with her Zoloft 150 mg but has not felt much improvement. She works as a pharmacy technician, and claims to be going to work on time and not missing out on days. She continues to have some passive suicidal thoughts, but no current intent or plans. Her protective factors to remain alive include her kids. She denies any substance use besides her Tylenol use.          Past Psychiatric History:   Past inpatient treatment: Prior hospitalizations in [facility] 2018, [facility] 2015.   Current outpatient psychiatrist: Drew Juarez, WILLIAN, CNP; [facility]  Current outpatient therapist: Currently involved in the IOP program through [facility].  Other (ACT team etc): None   Past suicide " attempts: Has had ideations in the past.   History of commitments: Denies   History of ECT: Denies         Substance Use and History:   Denies substance use issues. Utox was negative.         Past Medical History:   PAST MEDICAL HISTORY:   Past Medical History:   Diagnosis Date     Depressive disorder        PAST SURGICAL HISTORY: No past surgical history on file.          Family History:   FAMILY HISTORY: No family history on file.        Social History:   SOCIAL HISTORY:   Social History   Substance Use Topics     Smoking status: Not on file     Smokeless tobacco: Not on file     Alcohol use Not on file            Physical ROS:   The patient endorsed lethargy and fatigue.  The remainder of 10-point review of systems was negative except as noted in HPI.         PTA Medications:     Prescriptions Prior to Admission   Medication Sig Dispense Refill Last Dose     Sertraline HCl (ZOLOFT PO) Take 150 mg by mouth daily   3/22/2018 at 0800     TRAZODONE HCL PO Take 100 mg by mouth At Bedtime   3/21/2018 at 2100     Ondansetron (ZOFRAN ODT PO) Take 4 mg by mouth every 8 hours as needed for nausea   Past Month at Unknown time     MECLIZINE HCL PO Take 25 mg by mouth 3 times daily as needed for dizziness   Past Month at Unknown time     Vilazodone HCl (VIIBRYD PO) Pt states she was just prescribed this (starter pack) but hasn't started it yet.   Unknown at Unknown time     cetirizine (ZYRTEC) 10 MG tablet Take 10 mg by mouth daily as needed for allergies   More than a month at Unknown time          Allergies:   No Known Allergies       Labs:     Recent Results (from the past 48 hour(s))   HCG qualitative urine (UPT)    Collection Time: 03/22/18  2:14 PM   Result Value Ref Range    HCG Qual Urine Negative NEG^Negative   Drug abuse screen 77 urine (FL, RH, SH)    Collection Time: 03/22/18  2:14 PM   Result Value Ref Range    Amphetamine Qual Urine Negative NEG^Negative    Barbiturates Qual Urine Negative NEG^Negative     "Benzodiazepine Qual Urine Negative NEG^Negative    Cannabinoids Qual Urine Negative NEG^Negative    Cocaine Qual Urine Negative NEG^Negative    Opiates Qualitative Urine Negative NEG^Negative    PCP Qual Urine Negative NEG^Negative   Hepatic panel    Collection Time: 03/22/18  2:57 PM   Result Value Ref Range    Bilirubin Direct <0.1 0.0 - 0.2 mg/dL    Bilirubin Total 0.2 0.2 - 1.3 mg/dL    Albumin 3.7 3.4 - 5.0 g/dL    Protein Total 8.0 6.8 - 8.8 g/dL    Alkaline Phosphatase 86 40 - 150 U/L    ALT 16 0 - 50 U/L    AST 16 0 - 45 U/L   Acetaminophen level    Collection Time: 03/22/18  2:57 PM   Result Value Ref Range    Acetaminophen Level <2 mg/L          Physical and Psychiatric Examination:     /81  Pulse 82  Temp 97.1  F (36.2  C) (Oral)  Ht 1.626 m (5' 4\")  Wt 102.5 kg (226 lb)  BMI 38.79 kg/m2  Weight is 226 lbs 0 oz  Body mass index is 38.79 kg/(m^2).    Physical Exam:  I have reviewed the physical exam as documented by ED provider and agree with findings and assessment and have no additional findings to add at this time.    Mental Status Exam:  Appearance:  female, overweight appearance. Tearful. Laying in bed.   Attitude:  Anxious, tearful. Cooperative   Eye Contact:  Intermittent   Mood:  Depressed   Affect:  Mood congruent   Speech:  Soft in tone, slow in rate   Language: fluent and intact in English  Psychomotor, Gait, Musculoskeletal:  Unremarkable   Throught Process:  Linear   Associations:  Intact   Thought Content:  Passive SI, no intent or plan. No HI/AH/VH.   Insight:  Fair   Judgement:  Poor-Fair   Oriented to:  Person, place and time   Attention Span and Concentration:  Intact   Recent and Remote Memory:  Fair   Fund of Knowledge:  Average          Admission Diagnoses:   Major Depressive Disorder, recurrent, severe without psychosis  Generalized Anxiety Disorder  Traits of Borderline Personality Disorder         Assessment & Plan:     Assessment:  Misty exhibits depressive " symptoms, particularly the affective components, with increased suicidal ideation. She appears to have recentely been hospitalized only about a month ago for similar reasons and was started on antidepressants. Unclear how compliant she was but claims to have been taking medications, although denies any significant benefit. She has been taking large doses of Tylenol PM to help with her fractured sleep. Her hepatic panel and acetaminophen level were unremarkable. We discussed augmenting her SSRI with Abilify with has mood stabilization components along with providing more activation. She was open to starting it. She appears to have been told that Viibryd would be a good addition, although due to her current level of increased depression, she would benefit from something that has quicker onset of action , along with a different pharmacology compared to a serotinergic agent.     Plan:  - Start Abilify 5 mg Daily tomorrow, and increase to 10 mg thereafter.   - Continue Zoloft 150 mg Daily   - Continue Trazodone 100 mg HS    Legal:  72 hour hold 3/27/2018     Disposition:  Likely home after stabilization        Gerardo Madden MD  [facility]    Spent 50   minutes on encounter, >50% of which was spent in counseling and/or coordination of care, consisting of taking history, reviewing system, and discussing medication and side effects

## 2018-03-24 NOTE — PROGRESS NOTES
patient spent the whole shift in bed, only getting up for meals. Denied any mental health symptoms. Claims she wants to go home as soon as possible to see her children.       03/24/18 1400   Behavioral Health   Hallucinations denies / not responding to hallucinations   Thinking intact   Orientation person: oriented;place: oriented;date: oriented;time: oriented   Memory baseline memory   Insight poor   Judgement impaired   Eye Contact at examiner   Affect blunted, flat   Mood depressed   Physical Appearance/Attire appears stated age;disheveled   Hygiene neglected grooming - unclean body, hair, teeth   1. Wish to be Dead No   2. Non-Specific Active Suicidal Thoughts  No   Elopement Statements about wanting to leave   Activity isolative;withdrawn;other (see comment)  (Sleeping/napping)   Speech clear;coherent   Medication Sensitivity no stated side effects;no observed side effects   Psychomotor / Gait balanced;steady   Psycho Education   Type of Intervention 1:1 intervention   Response participates, initiates socially appropriate   Hours 0.5   Treatment Detail Ways to [place]   Activities of Daily Living   Hygiene/Grooming independent   Oral Hygiene independent   Dress independent   Laundry with supervision   Room Organization independent

## 2018-03-24 NOTE — PLAN OF CARE
Problem: Suicide Risk (Adult)  Goal: Identify Related Risk Factors and Signs and Symptoms  Related risk factors and signs and symptoms are identified upon initiation of Human Response Clinical Practice Guideline (CPG).   Outcome: No Change    Misty spent most of the evening in her room. She did not interact socially with her peers, and went to bed early. No one:one meeting was possible, as pt had already gone to bed. She was not woken to give her HS trazadone, and may yet have it tonight if she wakes up.

## 2018-03-25 PROCEDURE — 12400007 ZZH R&B MH INTERMEDIATE UMMC

## 2018-03-25 PROCEDURE — 25000132 ZZH RX MED GY IP 250 OP 250 PS 637: Performed by: PSYCHIATRY & NEUROLOGY

## 2018-03-25 RX ADMIN — ARIPIPRAZOLE 10 MG: 10 TABLET ORAL at 08:28

## 2018-03-25 RX ADMIN — SERTRALINE HYDROCHLORIDE 150 MG: 100 TABLET ORAL at 08:28

## 2018-03-25 RX ADMIN — Medication 7.5 MG: at 02:28

## 2018-03-25 ASSESSMENT — ACTIVITIES OF DAILY LIVING (ADL)
LAUNDRY: WITH SUPERVISION
DRESS: INDEPENDENT;STREET CLOTHES
GROOMING: INDEPENDENT
ORAL_HYGIENE: INDEPENDENT

## 2018-03-25 NOTE — PLAN OF CARE
Problem: Feelings of Worthlessness, Hopelessness, Excessive Guilt (Depressive Signs/Symptoms) (Adult)  Goal: Enhanced Self-Esteem/Confidence (Depressive Signs/Symptoms)  48 hour nursing assessment:  Pt evaluation continues. Assessed mood, anxiety, thoughts, and behavior. Is progressing towards goals. Encourage participation in groups and developing healthy coping skills.     Pt denies auditory or visual hallucinations. Pt presents with a flat/blunted affect.  Pt spent some time in the lounge watching tv but was minimally interactive with others and seems to keep to herself.  Pt denies SI/SIB.  Will continue to assess.

## 2018-03-25 NOTE — PROGRESS NOTES
Pt was isolative and withdrawn to shift for entire shift.  Pt did not attend groups, did come out to eat dinner.  Pt used telephone a few times throughout shift,  visited in evening.  Pt rated depression 6/10 (10 being the worst) and anxiety 4/10 (10 being worst).  Pt denied SI/SIB and denied having thoughts of being dead, stated that she felt safe in the hospital and that she would seek staff help if she felt unsafe.     03/24/18 0968   Behavioral Health   Hallucinations denies / not responding to hallucinations   Thinking intact   Orientation person: oriented;place: oriented;date: oriented;time: oriented   Memory baseline memory   Insight poor   Judgement impaired   Eye Contact at examiner   Affect sad   Mood depressed   Physical Appearance/Attire untidy   Hygiene neglected grooming - unclean body, hair, teeth   Suicidality other (see comments)  (denied, contracted for safety if she has any)   1. Wish to be Dead No   2. Non-Specific Active Suicidal Thoughts  No   Self Injury other (see comment)  (denied)   Elopement (none)   Activity isolative;withdrawn   Speech clear;coherent   Medication Sensitivity no stated side effects   Psychomotor / Gait balanced;steady   Safety   Suicidality Status 15;Minimal furniture in room   Activities of Daily Living   Hygiene/Grooming prompts   Oral Hygiene prompts   Dress street clothes;scrubs (behavioral health);prompts   Laundry unable to complete   Room Organization independent

## 2018-03-25 NOTE — PROGRESS NOTES
Pt was observed sitting in room on bed in the dark at 0210. She appeared tearful, said she couldn't fall back to sleep. Pt took PRN Remeron 7.5 mg po at 0228 and appeared asleep at 0315. Pt slept a total of 6.5 hours.

## 2018-03-26 PROCEDURE — 12400007 ZZH R&B MH INTERMEDIATE UMMC

## 2018-03-26 PROCEDURE — 25000132 ZZH RX MED GY IP 250 OP 250 PS 637: Performed by: PSYCHIATRY & NEUROLOGY

## 2018-03-26 PROCEDURE — 99232 SBSQ HOSP IP/OBS MODERATE 35: CPT | Performed by: PSYCHIATRY & NEUROLOGY

## 2018-03-26 RX ORDER — DIPHENHYDRAMINE HCL 50 MG
50 CAPSULE ORAL
Status: DISCONTINUED | OUTPATIENT
Start: 2018-03-26 | End: 2018-03-27 | Stop reason: HOSPADM

## 2018-03-26 RX ADMIN — TRAZODONE HYDROCHLORIDE 100 MG: 100 TABLET ORAL at 20:47

## 2018-03-26 RX ADMIN — SERTRALINE HYDROCHLORIDE 150 MG: 100 TABLET ORAL at 09:30

## 2018-03-26 RX ADMIN — Medication 7.5 MG: at 20:47

## 2018-03-26 RX ADMIN — ARIPIPRAZOLE 10 MG: 10 TABLET ORAL at 09:30

## 2018-03-26 ASSESSMENT — ACTIVITIES OF DAILY LIVING (ADL)
ORAL_HYGIENE: INDEPENDENT
GROOMING: INDEPENDENT
DRESS: INDEPENDENT

## 2018-03-26 NOTE — PROGRESS NOTES
"Patient did not report a goal for the evening and hopes to be discharged sometime this week. She hopes to speak with her doctor tomorrow about discharge plans. Patient was calm this evening, isolative and withdrawn to her room and did not attend groups. Patient denies SI/SIB as well as hallucinations. ADL's are independent with no nutrition concerns. Patient reports no effective therapeutic approaches. Patient also denies all mental health issues and reports that she is \"doing better\". Patient's  visited this evening. Patient reported this meeting went well, however staff noticed that she was teary eyed and quickly went to her room.       03/25/18 1952   Behavioral Health   Hallucinations denies / not responding to hallucinations   Thinking intact   Orientation time: oriented;date: oriented;place: oriented;person: oriented   Memory baseline memory   Insight poor   Judgement impaired   Eye Contact at examiner   Affect blunted, flat;sad   Mood mood is calm   Physical Appearance/Attire untidy   Hygiene neglected grooming - unclean body, hair, teeth   Suicidality other (see comments)  (Patient denies)   Self Injury other (see comment)  (Patient denies)   Elopement (Patient does not appear to be a risk)   Activity isolative;withdrawn   Speech clear;coherent   Medication Sensitivity no stated side effects;no observed side effects   Psychomotor / Gait steady;balanced     "

## 2018-03-27 VITALS
SYSTOLIC BLOOD PRESSURE: 107 MMHG | TEMPERATURE: 96.9 F | OXYGEN SATURATION: 99 % | HEIGHT: 64 IN | BODY MASS INDEX: 40.63 KG/M2 | RESPIRATION RATE: 16 BRPM | WEIGHT: 238 LBS | HEART RATE: 92 BPM | DIASTOLIC BLOOD PRESSURE: 70 MMHG

## 2018-03-27 PROCEDURE — 25000132 ZZH RX MED GY IP 250 OP 250 PS 637: Performed by: PSYCHIATRY & NEUROLOGY

## 2018-03-27 PROCEDURE — 99238 HOSP IP/OBS DSCHRG MGMT 30/<: CPT | Performed by: PSYCHIATRY & NEUROLOGY

## 2018-03-27 RX ORDER — MIRTAZAPINE 15 MG/1
7.5 TABLET, FILM COATED ORAL AT BEDTIME
Qty: 15 TABLET | Refills: 1 | Status: ON HOLD | OUTPATIENT
Start: 2018-03-27 | End: 2019-01-03

## 2018-03-27 RX ORDER — ARIPIPRAZOLE 10 MG/1
10 TABLET ORAL DAILY
Qty: 30 TABLET | Refills: 1 | Status: ON HOLD | OUTPATIENT
Start: 2018-03-28 | End: 2019-01-21

## 2018-03-27 RX ADMIN — SERTRALINE HYDROCHLORIDE 150 MG: 100 TABLET ORAL at 08:08

## 2018-03-27 RX ADMIN — ARIPIPRAZOLE 10 MG: 10 TABLET ORAL at 08:09

## 2018-03-27 NOTE — PROGRESS NOTES
Patient completed discharge paperwork with day shift nurse.  here to pick her up, Medications from pharmacy given and signed for. Patient's valuables sheet signed and given all belongings.

## 2018-03-27 NOTE — PROGRESS NOTES
Completed Case Management referral for patient this afternoon.  Provided patient with contact info for [facility] to follow up if she has not heard from them.

## 2018-03-27 NOTE — PLAN OF CARE
"Pt will be discharging to home, her  will pick her up.  Pt's affect is flat but brightens upon approach.  Pt denies SI/SIB and reports \"I will be safe at home.  I have no intentions of hurting myself\".  Pt has been calm and co-operative. Discharge teaching, including f/u care and medication teaching, has been completed with pt.  Pt verbalized understanding.  Pt denies SI/SIB.      "

## 2018-03-27 NOTE — PROGRESS NOTES
Pt denies SI/SIB. Rates depression at 3. Pt stated she would like to be discharged. Pt has flat affect and does not brighten at approach. Pt watched movies but was isolative to peers.     03/26/18 2149   Behavioral Health   Hallucinations denies / not responding to hallucinations   Thinking intact   Orientation person: oriented;place: oriented;date: oriented   Memory baseline memory   Insight admits / accepts   Judgement intact   Eye Contact out of corner of eyes   Affect blunted, flat   Mood mood is calm   Suicidality other (see comments)  (pt denies)   Self Injury other (see comment)  (pt denies)   Activities of Daily Living   Hygiene/Grooming independent   Oral Hygiene independent   Dress independent   Room Organization independent

## 2018-03-27 NOTE — DISCHARGE INSTRUCTIONS
Behavioral Discharge Planning and Instructions      Summary:  You were admitted on 3/22/2018  due to suicidal ideation.  You were treated by Dr Gerardo Madden MD and discharged on 03/27/2018 from [facility]    Principal Diagnosis:   Major Depressive Disorder, recurrent, severe without psychosis  Generalized Anxiety Disorder     Health Care Follow-up Appointments:   1. Psychiatry Appointment:  Date/Time: Thursday, March 29th, 2018 @ 12:40pm  Provider: [facility]  [address]  [address]  [place] [address]  Phone: [phone]  The Health unit Coordinator has faxed these discharge instructions to Fax: [phone].    2. Individual Therapy Appointment:  Date/Time: Tuesday, April 3rd, 2018 @ 8:00am  Provider: Cecelia Bethea MA [facility]  [address]  [address]  [place] [address]  Phone: [phone]  The [facility] unit Coordinator has faxed these discharge instructions to Fax: [phone].    3. Case Management Services:  You were referred to [facility] for adult mental health case management services.  If you have not heard from someone from that office please feel free to contact them directly to see about the referral.  The number for the [facility] is [phone].    Attend all scheduled appointments with your outpatient providers. Call at least 24 hours in advance if you need to reschedule an appointment to ensure continued access to your outpatient providers.   Major Treatments, Procedures and Findings:  You were provided with: a psychiatric assessment, assessed for medical stability, medication evaluation and/or management, group therapy, individual therapy, milieu management and medical interventions    Symptoms to Report: feeling more aggressive, increased confusion, losing more sleep, mood getting worse or thoughts of suicide    Early warning signs can include: increased depression or anxiety sleep  disturbances increased thoughts or behaviors of suicide or self-harm  increased unusual thinking, such as paranoia or hearing voices    Safety and Wellness:  Take all medicines as directed.  Make no changes unless your doctor suggests them.      Follow treatment recommendations.  Refrain from alcohol and non-prescribed drugs.  If there is a concern for safety, call 911.    Resources:   COPE (Community Outreach for Psychiatric Emergencies): [phone].Available 24-hours a day, 7 days a week. Call a COPE professional when a severe disturbance of mood or thinking is impacting your safety. COPE professionals are available to go where you are, handle an immediate crisis, and provide a clinical assessment. If needed, COPE will arrange an emergency evaluation for inpatient psychiatric services. Telephone consultations are also available. Ask them if you meet criteria for a crisis residence.   *You can also talk to [facility] about crisis stabilization services if you are experiencing difficulty with the transition from the hospital.  [facility] Acute [facility]  Acute Psychiatric Services/Crisis Intervention: [phone]  24-hour walk-in crisis intervention and treatment of behavioral emergencies    Located at:  [address] -  in the Emergency room on the first floor of the [facility]  [address] [place] [address]  Phone: [phone]  This is a crisis residence where you can stay for a short time if you feel like you need to stabilize but do not need to go to the hospital.    Crisis Connection 24/7 Community Call Center: [phone]  Phone: [phone] outside the [facility] area: [phone]  [email or web address].[email or web address]   Hours: 24 hours/day, 7 days/week  Services: Free and confidential telephone counseling provided by trained volunteers supervised by professional staff. Early intervention and brief supportive counseling for callers with issues of  depression, stress and anxiety, domestic violence, parent/child conflicts, family issues, loneliness, grief and loss, suicidal ideation and chronic mental illness.    [facility] [place] [facility]  You and your family would benefit from the support groups at [facility].   [email or web address]    [email or web address]    The [facility] [place] [facility] has a parent support and educator staff - Vern Brock - that can be a support for your parents. There are also many classes that are available to you and your family.  Vern can be reached at [phone] xt 106 or through e-mail at [email or web address].    Misty,  please take care and make your recovery a daily recovery. The treatment team has appreciated the opportunity to work with you.   If you have any questions or concerns our unit number is [phone].  You may be receiving a follow-up phone call within the next three days from a representative from behavioral health.  You have identified the best phone number to reach you as [phone].

## 2018-03-27 NOTE — PROGRESS NOTES
"[facility] [place]   Psychiatric Progress Note        Interim History:   Reason for Hospitalization:Misty is a 32 year old female with history of Major Depressive Disorder, Anxiety Disorder, Borderline Personality Disorder traits, who was admitted on a 72 hour hold for worsening suicidal ideation and plan.     Subjective: \"Doing a little better\"     As per today's interview: Patient was seen in her room, writing in her notebook. She was less tearful, although remains withdrawn in behavior and flat in affect. She felt her mood was relatively better compared to last week, and harbored less intense suicidal thoughts. Her ex- came to visit her over the weekend, which the patient felt went well. She is tolerating her Abilify addition, and feels that it is helping her stay a bit more balanced. Denies current SI/HI/AH/VH.          Medications:       ARIPiprazole  10 mg Oral Daily     sertraline (ZOLOFT) tablet 150 mg  150 mg Oral Daily     traZODone (DESYREL) tablet 100 mg  100 mg Oral At Bedtime          Allergies:   No Known Allergies       Labs:   No results found for this or any previous visit (from the past 48 hour(s)).       Psychiatric Examination:   /70  Pulse 92  Temp 97.2  F (36.2  C) (Oral)  Resp 16  Ht 1.626 m (5' 4\")  Wt 108 kg (238 lb)  BMI 40.85 kg/m2  Weight is 238 lbs 0 oz  Body mass index is 40.85 kg/(m^2).    Appearance:  female, overweight appearance. Less tearful compared to before.   Attitude:  Clamer. Cooperative   Eye Contact:  Improved   Mood:  Somewhat anxious  Affect:  Mood congruent   Speech:  Soft in tone, slow in rate   Language: fluent and intact in English  Psychomotor, Gait, Musculoskeletal:  Unremarkable   Throught Process:  Linear   Associations:  Intact   Thought Content:  No SI, no intent or plan. No HI/AH/VH.   Insight:  Fair   Judgement:  Poor-Fair   Oriented to:  Person, place and time   Attention Span and Concentration:  " Intact   Recent and Remote Memory:  Fair   Fund of Knowledge:  Average       Assessment & Plan       Principal Diagnosis:   Major Depressive Disorder, recurrent, severe without psychosis  Generalized Anxiety Disorder  Traits of Borderline Personality Disorder    Assessment:  (3/23): Misty exhibits depressive symptoms, particularly the affective components, with increased suicidal ideation. She appears to have recentely been hospitalized only about a month ago for similar reasons and was started on antidepressants. Unclear how compliant she was but claims to have been taking medications, although denies any significant benefit. She has been taking large doses of Tylenol PM to help with her fractured sleep. Her hepatic panel and acetaminophen level were unremarkable. We discussed augmenting her SSRI with Abilify with has mood stabilization components along with providing more activation. She was open to starting it. She appears to have been told that Viibryd would be a good addition, although due to her current level of increased depression, she would benefit from something that has quicker onset of action , along with a different pharmacology compared to a serotinergic agent.     (3/26): There is some subjective improvement in her mood with lesser intense suicidal thinking, denies any current SI. Her affect remains blunt, and her behavior is withdrawn. 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 We discussed having another day in the hospital, and recommended going to groups and doing theraputic calming activities.      Plan:  - Continue Abilify 10 mg Daily   - Continue Zoloft 150 mg Daily   - Continue Trazodone 100 mg HS     Legal:  72 hour hold (expires 3/27/2018 at 11:58 AM)      Disposition:  Likely home after stabilization          Gerardo Madden MD  [facility]         Transition of Care note: Patient has some improvement in her  subjective mood, although remains blunted in her affect, and isolative. She attributes her improvement to her Abilify. Her 72 hour hold runs out tomorrow at 11:58 AM. She is depressed, although denies suicidal thoughts, intent or plan. She is future oriented.

## 2019-01-03 ENCOUNTER — HOSPITAL ENCOUNTER (INPATIENT)
Facility: CLINIC | Age: 34
LOS: 18 days | Discharge: HOME | End: 2019-01-21
Attending: PSYCHIATRY & NEUROLOGY | Admitting: PSYCHIATRY & NEUROLOGY
Payer: COMMERCIAL

## 2019-01-03 ENCOUNTER — TELEPHONE (OUTPATIENT)
Dept: BEHAVIORAL HEALTH | Facility: CLINIC | Age: 34
End: 2019-01-03

## 2019-01-03 DIAGNOSIS — R45.851 DEPRESSION WITH SUICIDAL IDEATION: Primary | ICD-10-CM

## 2019-01-03 DIAGNOSIS — F32.A DEPRESSION WITH SUICIDAL IDEATION: Primary | ICD-10-CM

## 2019-01-03 DIAGNOSIS — F33.40 RECURRENT MAJOR DEPRESSIVE DISORDER, IN REMISSION (H): ICD-10-CM

## 2019-01-03 LAB
ALBUMIN SERPL-MCNC: 3.7 G/DL (ref 3.4–5)
ALBUMIN UR-MCNC: NEGATIVE MG/DL
ALP SERPL-CCNC: 76 U/L (ref 40–150)
ALT SERPL W P-5'-P-CCNC: 21 U/L (ref 0–50)
AMPHETAMINES UR QL SCN: NEGATIVE
ANION GAP SERPL CALCULATED.3IONS-SCNC: 6 MMOL/L (ref 3–14)
APPEARANCE UR: CLEAR
AST SERPL W P-5'-P-CCNC: 20 U/L (ref 0–45)
BARBITURATES UR QL: NEGATIVE
BENZODIAZ UR QL: NEGATIVE
BILIRUB DIRECT SERPL-MCNC: 0.1 MG/DL (ref 0–0.2)
BILIRUB SERPL-MCNC: 0.4 MG/DL (ref 0.2–1.3)
BILIRUB UR QL STRIP: NEGATIVE
BUN SERPL-MCNC: 10 MG/DL (ref 7–30)
CALCIUM SERPL-MCNC: 8.7 MG/DL (ref 8.5–10.1)
CANNABINOIDS UR QL SCN: NEGATIVE
CHLORIDE SERPL-SCNC: 108 MMOL/L (ref 94–109)
CO2 SERPL-SCNC: 25 MMOL/L (ref 20–32)
COCAINE UR QL: NEGATIVE
COLOR UR AUTO: ABNORMAL
CREAT SERPL-MCNC: 0.84 MG/DL (ref 0.52–1.04)
ERYTHROCYTE [DISTWIDTH] IN BLOOD BY AUTOMATED COUNT: 14.7 % (ref 10–15)
GFR SERPL CREATININE-BSD FRML MDRD: 90 ML/MIN/{1.73_M2}
GLUCOSE SERPL-MCNC: 93 MG/DL (ref 70–99)
GLUCOSE UR STRIP-MCNC: NEGATIVE MG/DL
HCG UR QL: NEGATIVE
HCT VFR BLD AUTO: 37.3 % (ref 35–47)
HGB BLD-MCNC: 12.1 G/DL (ref 11.7–15.7)
HGB UR QL STRIP: NEGATIVE
KETONES UR STRIP-MCNC: NEGATIVE MG/DL
LEUKOCYTE ESTERASE UR QL STRIP: ABNORMAL
MCH RBC QN AUTO: 28.4 PG (ref 26.5–33)
MCHC RBC AUTO-ENTMCNC: 32.4 G/DL (ref 31.5–36.5)
MCV RBC AUTO: 88 FL (ref 78–100)
MUCOUS THREADS #/AREA URNS LPF: PRESENT /LPF
NITRATE UR QL: NEGATIVE
OPIATES UR QL SCN: NEGATIVE
PCP UR QL SCN: NEGATIVE
PH UR STRIP: 7.5 PH (ref 5–7)
PLATELET # BLD AUTO: 279 10E9/L (ref 150–450)
POTASSIUM SERPL-SCNC: 3.7 MMOL/L (ref 3.4–5.3)
PROT SERPL-MCNC: 7.8 G/DL (ref 6.8–8.8)
RBC # BLD AUTO: 4.26 10E12/L (ref 3.8–5.2)
RBC #/AREA URNS AUTO: 1 /HPF (ref 0–2)
SODIUM SERPL-SCNC: 139 MMOL/L (ref 133–144)
SOURCE: ABNORMAL
SP GR UR STRIP: 1.01 (ref 1–1.03)
SQUAMOUS #/AREA URNS AUTO: 1 /HPF (ref 0–1)
TSH SERPL DL<=0.005 MIU/L-ACNC: 0.8 MU/L (ref 0.4–4)
UROBILINOGEN UR STRIP-MCNC: NORMAL MG/DL (ref 0–2)
WBC # BLD AUTO: 7.5 10E9/L (ref 4–11)
WBC #/AREA URNS AUTO: 2 /HPF (ref 0–5)

## 2019-01-03 PROCEDURE — 99221 1ST HOSP IP/OBS SF/LOW 40: CPT | Mod: AI | Performed by: PHYSICIAN ASSISTANT

## 2019-01-03 PROCEDURE — 12400000 ZZH R&B MH

## 2019-01-03 PROCEDURE — 87086 URINE CULTURE/COLONY COUNT: CPT | Performed by: PSYCHIATRY & NEUROLOGY

## 2019-01-03 PROCEDURE — 25000132 ZZH RX MED GY IP 250 OP 250 PS 637: Performed by: PSYCHIATRY & NEUROLOGY

## 2019-01-03 PROCEDURE — 81001 URINALYSIS AUTO W/SCOPE: CPT | Performed by: PHYSICIAN ASSISTANT

## 2019-01-03 PROCEDURE — 36415 COLL VENOUS BLD VENIPUNCTURE: CPT | Performed by: PSYCHIATRY & NEUROLOGY

## 2019-01-03 PROCEDURE — 90853 GROUP PSYCHOTHERAPY: CPT

## 2019-01-03 PROCEDURE — 85027 COMPLETE CBC AUTOMATED: CPT | Performed by: PSYCHIATRY & NEUROLOGY

## 2019-01-03 PROCEDURE — 80307 DRUG TEST PRSMV CHEM ANLYZR: CPT | Performed by: PHYSICIAN ASSISTANT

## 2019-01-03 PROCEDURE — 80048 BASIC METABOLIC PNL TOTAL CA: CPT | Performed by: PSYCHIATRY & NEUROLOGY

## 2019-01-03 PROCEDURE — 84443 ASSAY THYROID STIM HORMONE: CPT | Performed by: PSYCHIATRY & NEUROLOGY

## 2019-01-03 PROCEDURE — 81025 URINE PREGNANCY TEST: CPT | Performed by: PHYSICIAN ASSISTANT

## 2019-01-03 PROCEDURE — 80076 HEPATIC FUNCTION PANEL: CPT | Performed by: PSYCHIATRY & NEUROLOGY

## 2019-01-03 RX ORDER — TRAZODONE HYDROCHLORIDE 100 MG/1
100 TABLET ORAL AT BEDTIME
Status: DISCONTINUED | OUTPATIENT
Start: 2019-01-03 | End: 2019-01-21 | Stop reason: HOSPADM

## 2019-01-03 RX ORDER — HYDROXYZINE HYDROCHLORIDE 25 MG/1
25-50 TABLET, FILM COATED ORAL 2 TIMES DAILY PRN
Status: ON HOLD | COMMUNITY
End: 2019-01-21

## 2019-01-03 RX ORDER — HYDROXYZINE HYDROCHLORIDE 25 MG/1
25 TABLET, FILM COATED ORAL EVERY 4 HOURS PRN
Status: DISCONTINUED | OUTPATIENT
Start: 2019-01-03 | End: 2019-01-21 | Stop reason: HOSPADM

## 2019-01-03 RX ORDER — CETIRIZINE HYDROCHLORIDE 10 MG/1
10 TABLET ORAL DAILY PRN
Status: DISCONTINUED | OUTPATIENT
Start: 2019-01-03 | End: 2019-01-21 | Stop reason: HOSPADM

## 2019-01-03 RX ORDER — MIRTAZAPINE 30 MG/1
30 TABLET, FILM COATED ORAL AT BEDTIME
Status: ON HOLD | COMMUNITY
End: 2019-01-21

## 2019-01-03 RX ORDER — MIRTAZAPINE 30 MG/1
30 TABLET, FILM COATED ORAL AT BEDTIME
Status: DISCONTINUED | OUTPATIENT
Start: 2019-01-03 | End: 2019-01-21 | Stop reason: HOSPADM

## 2019-01-03 RX ORDER — HYDROXYZINE HYDROCHLORIDE 25 MG/1
25-50 TABLET, FILM COATED ORAL 2 TIMES DAILY PRN
Status: DISCONTINUED | OUTPATIENT
Start: 2019-01-03 | End: 2019-01-21 | Stop reason: HOSPADM

## 2019-01-03 RX ORDER — ARIPIPRAZOLE 10 MG/1
10 TABLET ORAL DAILY
Status: DISCONTINUED | OUTPATIENT
Start: 2019-01-03 | End: 2019-01-21 | Stop reason: HOSPADM

## 2019-01-03 RX ORDER — ONDANSETRON 4 MG/1
4 TABLET, ORALLY DISINTEGRATING ORAL EVERY 8 HOURS PRN
Status: DISCONTINUED | OUTPATIENT
Start: 2019-01-03 | End: 2019-01-07

## 2019-01-03 RX ORDER — ACETAMINOPHEN 325 MG/1
650 TABLET ORAL EVERY 4 HOURS PRN
Status: DISCONTINUED | OUTPATIENT
Start: 2019-01-03 | End: 2019-01-21 | Stop reason: HOSPADM

## 2019-01-03 RX ADMIN — MIRTAZAPINE 30 MG: 30 TABLET, FILM COATED ORAL at 20:58

## 2019-01-03 RX ADMIN — TRAZODONE HYDROCHLORIDE 100 MG: 100 TABLET ORAL at 20:58

## 2019-01-03 RX ADMIN — ACETAMINOPHEN 650 MG: 325 TABLET, FILM COATED ORAL at 20:58

## 2019-01-03 ASSESSMENT — ACTIVITIES OF DAILY LIVING (ADL)
DRESS: SCRUBS (BEHAVIORAL HEALTH);INDEPENDENT
BATHING: 0-->INDEPENDENT
RETIRED_COMMUNICATION: 0-->UNDERSTANDS/COMMUNICATES WITHOUT DIFFICULTY
TOILETING: 0-->INDEPENDENT
RETIRED_EATING: 0-->INDEPENDENT
COGNITION: 1 - ATTENTION OR MEMORY DEFICITS
TRANSFERRING: 0-->INDEPENDENT
ORAL_HYGIENE: INDEPENDENT
SWALLOWING: 0-->SWALLOWS FOODS/LIQUIDS WITHOUT DIFFICULTY
AMBULATION: 0-->INDEPENDENT
DRESS: 0-->INDEPENDENT
HYGIENE/GROOMING: INDEPENDENT
FALL_HISTORY_WITHIN_LAST_SIX_MONTHS: NO

## 2019-01-03 ASSESSMENT — MIFFLIN-ST. JEOR: SCORE: 1744.14

## 2019-01-03 NOTE — PROGRESS NOTES
"..Welcome packet reviewed with patient. Information reviewed includes getting emergency help, preventing infections, understanding your care, using medication safely, reducing falls, preventing pressure ulcers, smoking cessation, powerful choices and Patients Bill of Rights. Pt. given tour of the unit and instruction on use of facility including emergency call light. Program schedule reviewed with patient. Questions regarding the unit addressed. Pt. Search completed and belongings inventoried.   Nursing assessment complete including patient and medication profiles. Risk assessments completed addressing suicide,fall,skin,nutrition and safety issues. Care plan initiated. Assessments reviewed with physician and admit orders received. Video monitoring in progress, Patient Informed. Orientated to unit. Menu completed for today. Pt said, \"I have had suicidal thoughts for the past 2 weeks constantly.\"  When asked what would prevent pt from acting on suicidal thoughts. Pt said. \"My kids, I have 3, a 21 year old daughter, two sons age 15 and 12.  When asked if she wished to be dead, pt said, \"Yes.\"  When asked how her marriage is pt made a face and moved her hand in a manner that would make you think her marriage has some challenges. Pt pt didn't verbally answer the question. 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 Pt agreed, \"I will talk with staff if I feel suicidal and not act on thoughts to harm myself.\" Pt changed out of own clothing and into unit scrubs.   "

## 2019-01-03 NOTE — PROGRESS NOTES
01/03/19 1229   Valuables   Patient Belongings locker;other (see comments)   Patient Belongings Put in Hospital Secure Location (Security or Locker, etc.) clothing;purse/wallet;other (see comments)   Did you bring any home meds/supplements to the hospital?  Yes   Disposition of meds  Other (see comment)     Glasses  Long Sleeve T-Shirt  T-Shirt  Jeans  Shoes w/Laces  Jacket  Sweatshirt w/Strings  Purse  Cell Phone w/Case  Loose Change  Gum  Lip Balm  Charging Cord w/o Wall Outlet  Calendar  Misc. Business Cards  Misc. Papers  Spiral Notepad  Body Cream  Hand   Misc, Receipts  [employer] Rewards Card  Body Oil  $19 Cash  Bracelet x2  Rock  [place] Rew[place] Card  [place] Drivers License  Blue Cross Blue Shield Card  AAA Card    Security Envelope #[id number]:   Ibuprofen Bottle 200mg   Pseudoephedrine Hcl 30mg tabs x6   $70 Cash   [employer]   Visa #[id number]   [employer] Card #[id number]   [employer] $10 Coupon    Admission:  I am responsible for any personal items that are not sent to the safe or pharmacy. [facility] is not responsible for loss, theft or damage of any property in my possession.        Patient Signature: ___________________________________________      Date/Time:__________________________     Staff Signature: __________________________________      Date/Time:__________________________     2nd Staff person, if patient is unable/unwilling to sign:      __________________________________________________________      Date/Time: __________________________        Discharge:  [facility] has returned all of my personal belongings:     Patient Signature: ________________________________________     Date/Time: ____________________________________     Staff Signature: ______________________________________     Date/Time:_____________________________________

## 2019-01-03 NOTE — H&P
[facility]    History and Physical  Hospitalist       Date of Admission:  1/3/2019    Assessment & Plan   Misty Parham is a 33 year old female history of depressive disorder, anxiety disorder, and borderline personality disorder traits who presents as a direct admission from [facility] patient presented very depressed with thoughts of hopelessness, worthlessness, and current suicidal ideation with plan to overdose on medications.     Major Depressive disorder, recurrent  Suicidal ideation with plan in place, no attempt:   Longstanding hx of depression. This is her 3rd psychiatric admit in the past year for similar. Reports worsening depression with suicidal ideation and plan in place but no attempt. No prior suicide attempts but endorses ideation during prior admissions. Follows with [facility], direct admission from clinic today.  - med adjustments per psychiatry  - Basic labs including CMP, CBC, and TSH wnl. VSS.   - UA, drug screen, and HCG pending - no urinary symptoms    Hx insomnia: Takes trazodone 100mg PRN at bedtime, resume only per psychiatry.    Hx allergic rhinitis: Resume PTA zyrtec and Atarax PRN.    BMI 38 c/w Obese class II:   Follow up with PCP, once psychiatric issues are stable she should focus on diet and lifestyle changes. Given profound depression, it is not appropriate to formally  her at this time.    DVT Prophylaxis: Ambulate every shift  Code Status: Full Code    Disposition: Expected discharge per psychiatry.    This patient was discussed with Dr. Nagy of the Hospitalist Service who agrees with current plans as outlined above.    The patient appears medically stable presently. The Hospitalist service will chart check the UA results and drug screen in the AM and sign off if no significant abnormalities that need to be addressed by internal medicine.    Mireya Schultz PA-C  Hospitalist Physician Assistant  Pager:  [phone]      Primary Care Physician   WINIFRED KAY PA-C    Chief Complaint   Hopelessness, worthlessness, suicidal ideation    History is obtained from the patient and electronic health record    History of Present Illness   Misty Parham is a 33 year old female history of depressive disorder, anxiety disorder, and borderline personality disorder traits who presents as a direct admission from [facility] patient presented very depressed with thoughts of hopelessness, worthlessness, and current suicidal ideation with plan to overdose on medications.  Today she is from her  at this time and has a poor social support system.  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  Admission for psychiatry to [facility] March 2018 where she was under 72-hour hold for worsening suicidal ideation and plan.  He was discharged on trazodone 100 mg nightly and Zoloft 150 mg daily and started on new medications aripiprazole 10 mg daily and mirtazapine 15 mg nightly.  She is been following in psychiatry clinic with Dr. Madrigal and Drew Juarez, [facility]. Another previous psychiatric admission noted earlier this year. Pt feels very depressed today, somewhat tearful on exam. Depressed mood and affect. Soft spoken. Mild headache, trying to stay hydrated, has not slept much. No medical concerns and denies any formal non-psych prescription meds at home PTA other than atarax for itching with allergic rhinitis. Reports off an on increased frequency of stooling over the past month, no fevers or chills, and no formal diarrhea.    Past Medical History    I have reviewed this patient's medical history and updated it with pertinent information if needed.   Past Medical History:   Diagnosis Date     Depressive disorder        Past Surgical History   I have reviewed this patient's surgical history and updated it with pertinent information if needed.  No past surgical history on  "file.    Prior to Admission Medications   Prior to Admission Medications   Prescriptions Last Dose Informant Patient Reported? Taking?   ARIPiprazole (ABILIFY) 10 MG tablet Past Week at Unknown time  No Yes   Sig: Take 1 tablet (10 mg) by mouth daily   Ondansetron (ZOFRAN ODT PO) Past Month at Unknown time  Yes Yes   Sig: Take 4 mg by mouth every 8 hours as needed for nausea   TRAZODONE HCL PO Past Week at Unknown time  Yes Yes   Sig: Take 100 mg by mouth At Bedtime   cetirizine (ZYRTEC) 10 MG tablet More than a month at Unknown time  Yes No   Sig: Take 10 mg by mouth daily as needed for allergies   hydrOXYzine (ATARAX) 25 MG tablet Past Week at Unknown time  Yes Yes   Sig: Take 25-50 mg by mouth 2 times daily as needed for itching   mirtazapine (REMERON) 30 MG tablet Past Week at Unknown time  Yes Yes   Sig: Take 30 mg by mouth At Bedtime   vortioxetine (TRINTELLIX/BRINTELLIX) 10 MG tablet Past Week at Unknown time  Yes Yes   Sig: Take 10 mg by mouth daily      Facility-Administered Medications: None     Allergies   No Known Allergies    Social History   I have reviewed this patient's social history and updated it with pertinent information if needed. Misty Parham      Family History   I have reviewed this patient's family history and updated it with pertinent information if needed.   No family history on file.    Review of Systems   The 10 point Review of Systems is negative other than noted in the HPI or here.     Physical Exam   Temp: 97.4  F (36.3  C) Temp src: Oral BP: 131/75 Pulse: 85   Resp: 16        Vital Signs with Ranges  /75   Pulse 85   Temp 97.4  F (36.3  C) (Oral)   Resp 16   Ht 1.651 m (5' 5\")   Wt 104.8 kg (231 lb 1.6 oz)   BMI 38.46 kg/m    Temp:  [97.4  F (36.3  C)] 97.4  F (36.3  C)  Pulse:  [85] 85  Resp:  [16] 16  BP: (131)/(75) 131/75  231 lbs 1.6 oz    General: Awake, alert, obese  female sitting on edge of bed who appears stated age. Looks comfortable in no acute " distress.  HEENT: Normocephalic, atraumatic. Extraocular movements intact. Pupils equal round and reactive to light bilaterally. Oropharynx clear without exudates.   Respiratory: Clear to auscultation bilaterally, no crackles or wheezing.  Cardiovascular: Regular rate and rhythm, +S1 and S2 without murmur, gallops, or rubs. No peripheral edema. Dorsalis pedis pulses palpable bilaterally.  Gastrointestinal: Soft, non-tender, non-distended. Normoactive bowel sounds x4 quadrants.  Skin: Warm, dry. No rashes, no obvious rashes or lesions on exposed skin.  Musculoskeletal: No joint swelling, erythema or tenderness. Moves all extremities equally.  Neurologic: AAO x3. Cranial nerves 2-12 grossly intact, normal strength and sensation although poor effort.  Psychiatric: Depressed mood and affect. Soft spoken and tearful.     Data   Data reviewed today:  I personally reviewed no images or EKG's today.  Recent Labs   Lab 01/03/19  1328   WBC 7.5   HGB 12.1   MCV 88         POTASSIUM 3.7   CHLORIDE 108   CO2 25   BUN 10   CR 0.84   ANIONGAP 6   CRISTINE 8.7   GLC 93       Imaging:  No results found for this or any previous visit (from the past 24 hour(s)).

## 2019-01-03 NOTE — PLAN OF CARE
33 year old female received as a direct admit from [facility]-referred in by Drew Juarez NP. Patient is very depressed with SI (thoughts to overdose). Reports hopelessness and worthlessness.  from . No friends or social outlets. Struggling with 3 children who have no motivation to go to school. Works full-time. Very depressed in presentation. Soft spoken and withdrawn. Contracts for safety here in the hospital.

## 2019-01-03 NOTE — TELEPHONE ENCOUNTER
S: [facility] (Dr. Madrigal's clinic) called about a direct admit to [facility].    B: Pt has a hx of depression. Pt is currently suicidal with a plan to overdose on medicatons. Pt will be a direct admit form Dr. Carey clinic    A: PT going directly to unit    R: [facility] charge nurse aware of admission and pt is place in     R: Kaitlin/

## 2019-01-04 LAB
BACTERIA SPEC CULT: NORMAL
Lab: NORMAL
SPECIMEN SOURCE: NORMAL

## 2019-01-04 PROCEDURE — 25000132 ZZH RX MED GY IP 250 OP 250 PS 637: Performed by: PSYCHIATRY & NEUROLOGY

## 2019-01-04 PROCEDURE — 12400000 ZZH R&B MH

## 2019-01-04 RX ADMIN — TRAZODONE HYDROCHLORIDE 100 MG: 100 TABLET ORAL at 20:42

## 2019-01-04 RX ADMIN — MIRTAZAPINE 30 MG: 30 TABLET, FILM COATED ORAL at 20:42

## 2019-01-04 RX ADMIN — VORTIOXETINE 20 MG: 20 TABLET, FILM COATED ORAL at 09:29

## 2019-01-04 RX ADMIN — ARIPIPRAZOLE 10 MG: 10 TABLET ORAL at 09:29

## 2019-01-04 ASSESSMENT — ACTIVITIES OF DAILY LIVING (ADL)
DRESS: SCRUBS (BEHAVIORAL HEALTH)
HYGIENE/GROOMING: INDEPENDENT
DRESS: INDEPENDENT
HYGIENE/GROOMING: INDEPENDENT
ORAL_HYGIENE: INDEPENDENT
ORAL_HYGIENE: INDEPENDENT

## 2019-01-04 NOTE — H&P
"[facility] H&P Note       Initial History   The patient's care was discussed with the treatment team and chart notes were reviewed.     Patient examined for psychiatric admission.     IDENTIFICATION  Patient is a 33 year old female with two children. Patient follows Drew Juarez NP at [facility]. Pt sees PCP Monse Wilde. Pt seen on 01/04/19 by Dr. Madrigal.     CHIEF COMPLAINT  Increased depression and anxiety with suicidal ideations     HISTORY OF PRESENT ILLNESS  Patient attains a history of depression, anxiety, and borderline personality disorder. She was directly presented to the ED from [facility] after patient expressed suicidal ideations with a plan to overdose on medications. Patient attains a history of mental health hospitalizations, her most recent being from 03/23/18 - 03/27/18. She follows Drew Juarez NP at [facility] as outpatient.   Upon interview with Dr. Madrigal, patient reviews why she ended up here, \"there were some things I said that made him [Drew Juarez at [place]] that made him concerned about my safety.\" She states some life stressors include her  and her children. Her children have been skipping school/refuse to go and the patient has been having difficulties in parenting with her . The patient declares her children have been to a school therapist when they were younger, however have no partaken in any form of support since then. Dr. Madrigal suggests patient make an appointment for her 15 year old son who was diagnosed with ADHD, but has gone without treatment due to the patients  not believing in the diagnosis. Dr. Madrigal reviews patients current medications that include Abilify, Trintellix, Trazodone, and Remeron. She declares that her Trintellix was just recently increased to 20mg, she has been experiencing some nausea from it. The patient is not sure if Trintellix has " "helped her anxiety and depression as much as she would like. She declares that she ha been getting around 10-12 hours of sleep and wakes up a little groggy. Dr. Madrigal would like to see how the increase in Trintellix (to 20mg) made recently does for patient, thus there will be no medication changes made today.      CHEMICAL DEPENDENCY HISTORY  The patient does not attain any chemical dependency history.     PAST  PSYCHIATRIC HISTORY  Patient has been hospitalized multiple times here at [facility] and once at [facility] in 2015. Her most recent hospitalization was from 03/23/18-03/27/18. She has no history of suicide attempts. She follows Dr. Madrigal and Drew Juarez NP at [facility].   Previous medications include: Zoloft, Hydroxyzine, and Wellbutrin.  The patient attains no history of ECT psychiatric treatment.     FAMILY HISTORY  She believes that her mother had depression and had issues with alcohol. Her biological father emily had issues with mental illness and chemicals. Her maternal aunt likely has depression and chemical use as well. Her eldest son has started exhibiting the same symptoms as her. She states her family is \"anti-doctor.\"    SOCIAL HISTORY  The patient was raised as one of three children before her parents got a divorce. She has one adopted brother. She declares that her family was supportive. She graduated high school. 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 She was  to her  from [facility] for 15 years and they have biological children together. She and her  are  (for the 5-6 years) and he provides little support in regards of the children.      Medications     Medications Prior to Admission   Medication Sig Dispense Refill Last Dose     ARIPiprazole (ABILIFY) 10 MG tablet Take 1 tablet (10 mg) by mouth daily 30 tablet 1 Past Week at Unknown time     hydrOXYzine (ATARAX) 25 MG tablet Take 25-50 mg by mouth 2 times daily " "as needed for itching   Past Week at Unknown time     mirtazapine (REMERON) 30 MG tablet Take 30 mg by mouth At Bedtime   Past Week at Unknown time     Ondansetron (ZOFRAN ODT PO) Take 4 mg by mouth every 8 hours as needed for nausea   Past Month at Unknown time     TRAZODONE HCL PO Take 100 mg by mouth At Bedtime   Past Week at Unknown time     vortioxetine (TRINTELLIX/BRINTELLIX) 10 MG tablet Take 10 mg by mouth daily   Past Week at Unknown time     cetirizine (ZYRTEC) 10 MG tablet Take 10 mg by mouth daily as needed for allergies   More than a month at Unknown time       Scheduled Medications:    ARIPiprazole  10 mg Oral Daily     mirtazapine  30 mg Oral At Bedtime     traZODone  100 mg Oral At Bedtime     vortioxetine  20 mg Oral Daily     PRNs:  acetaminophen, cetirizine, hydrOXYzine, hydrOXYzine, ondansetron      Allergies      No Known Allergies     Previous Medical History     Past Medical History:   Diagnosis Date     Depressive disorder         Medical Review of Systems     /72   Pulse 83   Temp 98.1  F (36.7  C) (Oral)   Resp 16   Ht 1.651 m (5' 5\")   Wt 104.8 kg (231 lb 1.6 oz)   BMI 38.46 kg/m    Body mass index is 38.46 kg/m .    Previous 10-point ROS completed by Mireya Schultz PA-C on 1/03/19 reviewed by Zhang Madrigal MD on January 4, 2019 and is unchanged except for those problems mentioned within the HPI.     Mental Status Examination     Appearance Sitting in chair, dressed in hospital scrubs. Appears stated age.   Attitude Cooperative   Orientation Oriented to person, place, time   Eye Contact Good   Speech Regular rate, rhythm, volume and tone   Language Normal   Psychomotor Behavior Normal   Mood Depressed and anxious   Affect Flat and quiet    Thought Process Goal-Oriented, Intact   Associations Intact   Thought Content Patient is currently negative for suicidal ideation, negative for plan or intent, able to contract no self harm and identify barriers to suicide.  " Negative for obsessions, compulsions or psychosis.     Fund of Knowledge Intact   Insight Intact   Judgement Intact   Attention Span & Concentration Intact   Recent & Remote Memory Intact   Gait Normal   Muscle Tone Intact      Labs     Labs reviewed.  Recent Results (from the past 24 hour(s))   CBC with platelets    Collection Time: 01/03/19  1:28 PM   Result Value Ref Range    WBC 7.5 4.0 - 11.0 10e9/L    RBC Count 4.26 3.8 - 5.2 10e12/L    Hemoglobin 12.1 11.7 - 15.7 g/dL    Hematocrit 37.3 35.0 - 47.0 %    MCV 88 78 - 100 fl    MCH 28.4 26.5 - 33.0 pg    MCHC 32.4 31.5 - 36.5 g/dL    RDW 14.7 10.0 - 15.0 %    Platelet Count 279 150 - 450 10e9/L   Basic metabolic panel    Collection Time: 01/03/19  1:28 PM   Result Value Ref Range    Sodium 139 133 - 144 mmol/L    Potassium 3.7 3.4 - 5.3 mmol/L    Chloride 108 94 - 109 mmol/L    Carbon Dioxide 25 20 - 32 mmol/L    Anion Gap 6 3 - 14 mmol/L    Glucose 93 70 - 99 mg/dL    Urea Nitrogen 10 7 - 30 mg/dL    Creatinine 0.84 0.52 - 1.04 mg/dL    GFR Estimate 90 >60 mL/min/[1.73_m2]    GFR Estimate If Black >90 >60 mL/min/[1.73_m2]    Calcium 8.7 8.5 - 10.1 mg/dL   TSH with free T4 reflex and/or T3 as indicated    Collection Time: 01/03/19  1:28 PM   Result Value Ref Range    TSH 0.80 0.40 - 4.00 mU/L   Hepatic panel    Collection Time: 01/03/19  1:28 PM   Result Value Ref Range    Bilirubin Direct 0.1 0.0 - 0.2 mg/dL    Bilirubin Total 0.4 0.2 - 1.3 mg/dL    Albumin 3.7 3.4 - 5.0 g/dL    Protein Total 7.8 6.8 - 8.8 g/dL    Alkaline Phosphatase 76 40 - 150 U/L    ALT 21 0 - 50 U/L    AST 20 0 - 45 U/L   UA with Microscopic reflex to Culture    Collection Time: 01/03/19  7:00 PM   Result Value Ref Range    Color Urine Light Yellow     Appearance Urine Clear     Glucose Urine Negative NEG^Negative mg/dL    Bilirubin Urine Negative NEG^Negative    Ketones Urine Negative NEG^Negative mg/dL    Specific Gravity Urine 1.011 1.003 - 1.035    Blood Urine Negative NEG^Negative     pH Urine 7.5 (H) 5.0 - 7.0 pH    Protein Albumin Urine Negative NEG^Negative mg/dL    Urobilinogen mg/dL Normal 0.0 - 2.0 mg/dL    Nitrite Urine Negative NEG^Negative    Leukocyte Esterase Urine Moderate (A) NEG^Negative    Source Midstream Urine     WBC Urine 2 0 - 5 /HPF    RBC Urine 1 0 - 2 /HPF    Squamous Epithelial /HPF Urine 1 0 - 1 /HPF    Mucous Urine Present (A) NEG^Negative /LPF   HCG qualitative urine    Collection Time: 01/03/19  7:00 PM   Result Value Ref Range    HCG Qual Urine Negative NEG^Negative   Drug abuse screen 77 urine (FL, RH, SH)    Collection Time: 01/03/19  7:00 PM   Result Value Ref Range    Amphetamine Qual Urine Negative NEG^Negative    Barbiturates Qual Urine Negative NEG^Negative    Benzodiazepine Qual Urine Negative NEG^Negative    Cannabinoids Qual Urine Negative NEG^Negative    Cocaine Qual Urine Negative NEG^Negative    Opiates Qualitative Urine Negative NEG^Negative    PCP Qual Urine Negative NEG^Negative   Urine Culture Aerobic Bacterial    Collection Time: 01/03/19  7:00 PM   Result Value Ref Range    Specimen Description Midstream Urine     Special Requests Specimen received in preservative     Culture Micro PENDING           Impression   This is a 33 year old female with a history of major depression (recurrent and severe), anxiety, and borderline personality disorder. She has been hospitalized in the past in regards of her mental health, twice at  in 2018 and another at [facility] in 2015. Her most recent hospitalization being from 03/23/18 - 03/27/18. She follows Drew Juarez NP at [facility]. The patient's Trintellix dosage was increased to 20mg daily, Dr. Madrigal would like to leave all other medications the same at this time. Will consider ECT treatment.      Diagnoses     1. Major depression, recurrent, severe, without psychotic features.  2. Generalized Anxiety Disorder  3. Traits of Borderline Personality Disorder     Plan      1. Explained side effects, benefits, and complications of medications to the patient, Pt gave verbal consent.  2. Medication changes: Increased Trintellix to 20mg  3. Discussed treatment plan with patient and team.  4. Projected length of stay: 7+ days  5. Possibly consider ECT treatment    Attestation:   Patient has been seen and evaluated by me, Zhang Madrigal MD.    Patient ID:  Name: Misty Parham MRN: [id number]  Admission: 1/3/2019 YOB: 1985

## 2019-01-04 NOTE — PLAN OF CARE
"  Depressive Symptoms  Depressive Symptoms  Description  Signs and symptoms of listed problems will be absent or manageable.  1/3/2019 2126 - No Change by Aileen Em     Patient presents with blunt affect, depressed and anxious mood. Spent most of shift resting in bed. Got up for dinner and to color during OT. Declined other groups. Seen by hospitalist this shift. Minimally social. Brightens minimally on approach. Says she's doing \"fine\" today. Feeling anxious and depressed. States she just wants to go home. Denies any SI and says she doesn't think she needs to be here. Med-compliant.  "

## 2019-01-04 NOTE — PLAN OF CARE
Pt presents as withdrawn, depressed, and has a blunt and flat affect. Pt spent the majority of the shift in her room bed resting. Came out to eat lunch in the lounge, but did not interact with any other patients. Did not attend any groups this shift. During staff check-in pt gave one word answers. Denies any self-harming thoughts and SI this shift.

## 2019-01-04 NOTE — PROGRESS NOTES
01/04/19 1300   General Information   Has Not Attended OT as of: 01/04/19     Pt has not attended OT since admit.  Will continue to encourage participation and completion of  self-assessment as able. OT staff will explain the purpose of being involved with treatment plan and provide options to meet current needs and goals.

## 2019-01-05 PROCEDURE — 12400000 ZZH R&B MH

## 2019-01-05 PROCEDURE — 25000132 ZZH RX MED GY IP 250 OP 250 PS 637: Performed by: PSYCHIATRY & NEUROLOGY

## 2019-01-05 RX ADMIN — TRAZODONE HYDROCHLORIDE 100 MG: 100 TABLET ORAL at 21:13

## 2019-01-05 RX ADMIN — MIRTAZAPINE 30 MG: 30 TABLET, FILM COATED ORAL at 21:13

## 2019-01-05 RX ADMIN — VORTIOXETINE 20 MG: 20 TABLET, FILM COATED ORAL at 09:20

## 2019-01-05 RX ADMIN — ARIPIPRAZOLE 10 MG: 10 TABLET ORAL at 09:20

## 2019-01-05 ASSESSMENT — ACTIVITIES OF DAILY LIVING (ADL)
HYGIENE/GROOMING: INDEPENDENT
HYGIENE/GROOMING: INDEPENDENT
DRESS: INDEPENDENT
DRESS: INDEPENDENT
ORAL_HYGIENE: INDEPENDENT
ORAL_HYGIENE: INDEPENDENT

## 2019-01-05 NOTE — PLAN OF CARE
"Pt presents as depressed and has a blunt and flat affect. Pt spent the first half of shift in her room, but was visible in the lounge after lunch. During staff check-in pt only responded in couple word answers and stated \"I feel better today.\" Denies any self-harming thoughts or SI.   "

## 2019-01-05 NOTE — PLAN OF CARE
Patient appears slightly improved this shift. She was in the lounge for about 3 hours after eating dinner with her peers. Patient's  visited, appeared to go well. Patient still does not offer much conversation with staff during attempted 1:1 check-in. Denies suicidal ideation and contracts for safety.

## 2019-01-06 PROCEDURE — 12400000 ZZH R&B MH

## 2019-01-06 PROCEDURE — 25000132 ZZH RX MED GY IP 250 OP 250 PS 637: Performed by: PSYCHIATRY & NEUROLOGY

## 2019-01-06 RX ADMIN — VORTIOXETINE 20 MG: 20 TABLET, FILM COATED ORAL at 08:49

## 2019-01-06 RX ADMIN — ARIPIPRAZOLE 10 MG: 10 TABLET ORAL at 08:49

## 2019-01-06 RX ADMIN — TRAZODONE HYDROCHLORIDE 100 MG: 100 TABLET ORAL at 21:22

## 2019-01-06 RX ADMIN — MIRTAZAPINE 30 MG: 30 TABLET, FILM COATED ORAL at 21:22

## 2019-01-06 ASSESSMENT — ACTIVITIES OF DAILY LIVING (ADL)
HYGIENE/GROOMING: INDEPENDENT
LAUNDRY: WITH SUPERVISION
HYGIENE/GROOMING: INDEPENDENT
DRESS: INDEPENDENT
ORAL_HYGIENE: INDEPENDENT
DRESS: INDEPENDENT
ORAL_HYGIENE: INDEPENDENT

## 2019-01-06 NOTE — PLAN OF CARE
Pt presents as depressed, anxious, and has a flat affect. Pt attended community meeting this morning and her goal was to stay out of her room more today. Pt was more social with other patients; was seen playing cards with patients in the lounge. During staff check-in pt still tends to keep to herself, but did say she was feeling anxious and depressed today. Denies any self-harming thoughts or SI this shift.

## 2019-01-06 NOTE — PLAN OF CARE
Patient more visible this shift than yesterday. Visited with  and a few friends, appeared to go well. Attended wrap-up group & socialized with peers just before HS. During staff check in patient said she and her  are trying to work things out with their marriage. Denies suicidal ideation & contracts for safety.

## 2019-01-07 PROCEDURE — 12400000 ZZH R&B MH

## 2019-01-07 PROCEDURE — 90853 GROUP PSYCHOTHERAPY: CPT

## 2019-01-07 PROCEDURE — 90791 PSYCH DIAGNOSTIC EVALUATION: CPT

## 2019-01-07 PROCEDURE — 25000132 ZZH RX MED GY IP 250 OP 250 PS 637: Performed by: PSYCHIATRY & NEUROLOGY

## 2019-01-07 PROCEDURE — 97150 GROUP THERAPEUTIC PROCEDURES: CPT | Mod: GO

## 2019-01-07 RX ORDER — ONDANSETRON 4 MG/1
4 TABLET, FILM COATED ORAL DAILY PRN
Status: DISCONTINUED | OUTPATIENT
Start: 2019-01-07 | End: 2019-01-21 | Stop reason: HOSPADM

## 2019-01-07 RX ADMIN — ARIPIPRAZOLE 10 MG: 10 TABLET ORAL at 09:14

## 2019-01-07 RX ADMIN — TRAZODONE HYDROCHLORIDE 100 MG: 100 TABLET ORAL at 21:34

## 2019-01-07 RX ADMIN — VORTIOXETINE 20 MG: 20 TABLET, FILM COATED ORAL at 09:14

## 2019-01-07 RX ADMIN — MIRTAZAPINE 30 MG: 30 TABLET, FILM COATED ORAL at 21:34

## 2019-01-07 ASSESSMENT — ACTIVITIES OF DAILY LIVING (ADL)
ORAL_HYGIENE: INDEPENDENT
ORAL_HYGIENE: INDEPENDENT
HYGIENE/GROOMING: INDEPENDENT
LAUNDRY: WITH SUPERVISION
HYGIENE/GROOMING: INDEPENDENT
LAUNDRY: WITH SUPERVISION
DRESS: INDEPENDENT
DRESS: INDEPENDENT

## 2019-01-07 NOTE — PLAN OF CARE
Adult Behavioral Health Plan of Care  Team Discussion  Description  Team Plan:  1/7/2019 1206 - No Change by Robert Shaver  Note  BEHAVIORAL TEAM DISCUSSION    Participants: Dr. Madrigal, , Nursing staff and PA's   Progress: Pt continues to endorse depressive symptoms with a flat affect  Continued Stay Criteria/Rationale: Until patient's depressive symptoms are stabilized on medication  Medical/Physical: Introduce ECT possibility, PRN Zofran  Precautions:   Behavioral Orders   Procedures    Code 1 - Restrict to Unit    Routine Programming     As clinically indicated    Status 15     Every 15 minutes.     Plan: Pt was given medication information and gave verbal consent. Plan of care was discussed with patient and team. Show patient ECT video and if she consents, then start treatment on 1/9/19. PRN Zofran 4mg ordered. Continue hospitalization.  Rationale for change in precautions or plan: Zofran was ordered to decrease side symptoms of nausea. ECT possibility to help decrease depressive symptoms.

## 2019-01-07 NOTE — PLAN OF CARE
Depressive Symptoms  Depressive Symptoms  Description  Signs and symptoms of listed problems will be absent or manageable.  1/7/2019 1430 - No Change by Robert Shaver  Flowsheets  Taken 1/7/2019 1424   Depressive Symptoms Assessed  all  Depressive Symptoms Present  affect;mood;insight;thought process  Note  Pt was isolative and withdrawn within the SDU. Pt presents with a flat sad affect and depressed mood. Pt spent most of the day in her room resting in bed. Pt attended focus group and OT, but minimally participated. Pt was minimally social with staff and peers. Pt was shown the ECT video to try to gauge interest, but seemed indecisive. Pt is concerned because her job is a 9-5 during the week and does not want to stay in the hospital for a couple more weeks. Pt ate all of her food and was med compliant.

## 2019-01-07 NOTE — PLAN OF CARE
Pt presents as depressed and has a blunt affect. More visible around unit and was out of her room for most of the shift; pt was a little more social with other patients this shift. Attended groups and participated appropriately. Pt had visitors come by and it appeared to go over well. During staff check-in pt reported feeling better today. Denies any SI this shift.

## 2019-01-07 NOTE — PROGRESS NOTES
01/07/19 1300   General Information   Date Initially Attended OT 01/07/19   Clinical Impression   Affect Flat   Orientation Oriented to person, place and time   Appearance and ADLs General cleanliness observed in most areas   Attention to Internal Stimuli No observed signs   Interaction Skills Interacts with prompts, minimal response   Ability to Communicate Needs Does so with prompts   Verbal Content Appropriate to topic   Ability to Maintain Boundaries Maintains appropriate physical boundaries;Maintains appropriate verbal boundaries   Participation Participates with frequent encouragement   Concentration Concentrates 5-10 minutes   Ability to Concentrate With structure   Follows and Comprehends Directions Needs further assessment   Memory Delayed and immediate recall intact   Organization Independently organizes medium tasks   Decision Making Needs further assessment   Planning and Problem Solving Needs further assessment   Ability to Apply and Learn Concepts Applies within group structure   Frustrations / Stress Tolerance Needs further assessment   Level of Insight Some insight   Self Esteem Poor self esteem   Social Supports Has knowledge of support systems   General Observation/Plan   General Observations/Plan See Comments     INITIAL O.T. ASSESSMENT:      With initial task set up and MOD encouragement, pt participated in therapeutic group activity and discussion addressing role performance. Pt engaged in therapeutic activity addressing functional cognition and interpersonal skills with MOD A. With task set up and direct VCs, Pt ID'd roles she currently has and responsibilities of each role (e.g. Mother, wife). Additionally, Pt ID'd what she does well in each role and what she could improve (e.g. Interact more with her children and be more involved in their school). Problem-solved ways to implement changes (e.g. Play games together) with Pt appreciative of information given. Pt will continue to benefit from  "OT intervention to address implementation of positive functional coping skills, role performance, and community reintegration.     Pt was given and completed a written self assessment. Cited \"depression and anxiety\" as the reason for current hospitalization. Goals ID'd for self include to improve self-confidence, to increase motivation, to feel more comfortable trying new things, and to feel more comfortable initiating comments with peers. OT staff explained the purpose of pt being involved in current treatment plan.      Plan: Encourage ongoing attendance as able to meet self-stated goals, implement positive coping skills, engage in therapeutic activities, and provide assessment ongoing.      "

## 2019-01-07 NOTE — H&P
[facility] Psychiatric Progress Note       Interim History   The patient's care was discussed with the treatment team and chart notes were reviewed. Over the weekend (per attending hospital staff note), the patient has been pleasant, cooperative, medication compliant, but isolative. She made it a goal of hers to be more social and to stay out of her room. She attends groups and participates appropriately. The patient did state that she was still feeling depressed and anxious, however has been denying any thoughts of suicide. Pt seen on 1/07/19 by Dr. Madrigal. Upon interview, patient states she is feeling a little better, however still feels depressed. She continues to wake up in the middle of the night, however this has been a chronic problem for patient. Patient declares she has been a little nauseous after taking her Trintellix medication that was increased to 20mg on 1/03. From this, Dr. Madrigal will order patient Zofran in order to aid in this nausea. Dr. Madrigal also discusses ECT psychiatric treatment with patient. He informs her of the procedure itself and its possible effects such as short term memory loss (for a short amount of time) and also reviews the benefits of this form of treatment. Patient has agreed to watch the ECT video.      Hospital Course   This is a 33 year old female with a history of major depression (recurrent and severe), anxiety, and borderline personality disorder. She has been hospitalized in the past in regards of her mental health, twice at  in 2018 and another at [facility] in 2015. Her most recent hospitalization being from 03/23/18 - 03/27/18. She follows Drew Juarez NP at [facility]. The patient's Trintellix dosage was increased to 20mg daily, Dr. Madrigal would like to leave all other medications the same at this time. Over the weekend (per attending hospital staff note), the patient has been pleasant, cooperative,  "medication compliant, however isolative. She made it a goal of hers to be more social with her peers and to avoid her room. She has attended group sessions and participates appropriately. The patient did state that she was still feeling depressed and anxious, however has been denying any thoughts of suicide. The patient has been tolerating her current medication regiment well, however she has been experiencing some mild nausea after the increase in Trintellix made on 1/03.      Medications     Current Facility-Administered Medications Ordered in Epic   Medication Dose Route Frequency Last Rate Last Dose     acetaminophen (TYLENOL) tablet 650 mg  650 mg Oral Q4H PRN   650 mg at 01/03/19 2058     ARIPiprazole (ABILIFY) tablet 10 mg  10 mg Oral Daily   10 mg at 01/06/19 0849     cetirizine (zyrTEC) tablet 10 mg  10 mg Oral Daily PRN         hydrOXYzine (ATARAX) tablet 25 mg  25 mg Oral Q4H PRN         hydrOXYzine (ATARAX) tablet 25-50 mg  25-50 mg Oral BID PRN         mirtazapine (REMERON) tablet 30 mg  30 mg Oral At Bedtime   30 mg at 01/06/19 2122     ondansetron (ZOFRAN-ODT) ODT tab 4 mg  4 mg Oral Q8H PRN         traZODone (DESYREL) tablet 100 mg  100 mg Oral At Bedtime   100 mg at 01/06/19 2122     vortioxetine (TRINTELLIX/BRINTELLIX) tablet 20 mg  20 mg Oral Daily   20 mg at 01/06/19 0849     No current [facility] outpatient medications on file.         Allergies      No Known Allergies     Medical Review of Systems     /70   Pulse 73   Temp 97.8  F (36.6  C) (Oral)   Resp 16   Ht 1.651 m (5' 5\")   Wt 104.8 kg (231 lb 1.6 oz)   BMI 38.46 kg/m    Body mass index is 38.46 kg/m .  A 10-point review of systems was performed by Zhang Madrigal MD and is negative, no new findings.      Psychiatric Examination     Appearance Sitting in chair, dressed in hospital scrubs. Appears stated age.   Attitude Cooperative   Orientation Oriented to person, place, time   Eye Contact Poor   Speech Regular rate, " rhythm, volume and tone   Language Normal   Psychomotor Behavior Normal   Mood Depressed and anxious   Affect Flat and quiet    Thought Process Goal-Oriented, Intact   Associations Intact   Thought Content Patient is currently negative for suicidal ideation, negative for plan or intent, able to contract no self harm and identify barriers to suicide.  Negative for obsessions, compulsions or psychosis.     Fund of Knowledge Intact   Insight Intact   Judgement Intact   Attention Span & Concentration Intact   Recent & Remote Memory Intact   Gait Normal   Muscle Tone Intact        Labs     Labs reviewed.  No results found for this or any previous visit (from the past 24 hour(s)).     Impression   This is a 33 year old female with a history of major depression (recurrent and severe), anxiety, and borderline personality disorder. She has been hospitalized in the past in regards of her mental health, twice at  in 2018 and another at [facility] in 2015. Her most recent hospitalization being from 03/23/18 - 03/27/18. She follows Drew Juarez NP at [facility]. Over the weekend (per attending hospital staff note), the patient has been pleasant, cooperative, medication compliant, however isolative. She made it a goal of hers to be more social with her peers and to avoid her room. She has attended group sessions and participates appropriately. The patient did state that she was still feeling depressed and anxious, however has been denying any thoughts of suicide. The patient has been tolerating her current medication regiment well, however has been experiencing some mild nausea after her Tritnellix dosage was increased on 1/03. From this, Dr. Madrigal has ordered Zofran 4mg to aid in this naseaDr. Kaitlin also discusses ECT psychiatric treatment with patient. He informs her of the procedure itself and its possible effects such as short term memory loss (for a short amount of time) and also reviews  the benefits of this form of treatment. Patient has agreed to watch the ECT video.     Diagnoses   1. Major depression, recurrent, severe, without psychotic features.  2. Generalized Anxiety Disorder  3. Traits of Borderline Personality Disorder     Plan     1. Explained side effects, benefits, and complications of medications to the patient, Pt gave verbal consent.  2. Medication changes: Zofran 4mg, take if feeling nauseous   3. Discussed treatment plan with patient and team.  4. Projected length of stay: 7+ days      Attestation:   Patient has been seen and evaluated by me, Zhang Madrigal MD.    Patient ID:  Name: Misty Parham    MRN: [id number]  Admission: 1/3/2019   YOB: 1985

## 2019-01-07 NOTE — H&P
Social History      Reason for Admission:  Patient is 33 year old female admitted to [facility]'s Inpatient Mental Health Unit on 1/3/2019.  Patient stated she had been suicidal for a couple of weeks and the day of admission she was in to see her psychiatric nurse where she verbalized her thoughts.  Her nurse recommended she come to the hospital.  Patient was driven by her sister.  Patient stated that she worries a lot and was tired of the worrying.     Previous Mental & Chemical Dependency History:  This is patient's third mental health hospitalization.  She was hospitalized here in February of 2018 and at [facility] in March of 2018.  Patient sees Drew Juarez RN, [facility] for medication management. She has a therapist, Eli at [facility], whom she hasn't seen since October 2018 because of conflicts with her job.  Patient rarely drinks alcohol and uses no other chemicals.  She drinks a pop a day, does not smoke or roman.      Social History:  Patient was born in [place] and grew up in [place].  Patient's parents  when she was 4 years old.  Patient rarely sees her father.  Patient's mother is an alcoholic.  She also has a maternal aunt who is an alcoholic.  Patient is the third of 4 siblings, all sisters.  Patient and her  have been  for 17 years.  They have been  for 6 of those years.  She stated that recently they have been talking and trying to work things out.  Patient and spouse have two sons, ages 15 and 12.  Patient also has a 21 year old step daughter.      Education and Work History:  Patient graduated from high school and has taken some college courses.  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  She is still employed working full time at [employer] but has changed positions and now stocks shelves.  She does have health insurance.      Living Situation:  Patient and her children live  with her mother in [place].      Legal Issues:  None.    Significant Life Events: None.     Temple:  Patient grew up [employer]byterian, but converted to Yazdanism when she  her .       History:  None.     Discharge Considerations:  Patient's support system includes her mother and her outpatient providers.  Social Service will remain available to assist with discharge needs.

## 2019-01-08 PROCEDURE — 90853 GROUP PSYCHOTHERAPY: CPT

## 2019-01-08 PROCEDURE — 93010 ELECTROCARDIOGRAM REPORT: CPT | Performed by: INTERNAL MEDICINE

## 2019-01-08 PROCEDURE — 12400000 ZZH R&B MH

## 2019-01-08 PROCEDURE — 25000132 ZZH RX MED GY IP 250 OP 250 PS 637: Performed by: PSYCHIATRY & NEUROLOGY

## 2019-01-08 PROCEDURE — 99231 SBSQ HOSP IP/OBS SF/LOW 25: CPT | Performed by: NURSE PRACTITIONER

## 2019-01-08 PROCEDURE — 93005 ELECTROCARDIOGRAM TRACING: CPT

## 2019-01-08 RX ORDER — ONDANSETRON 2 MG/ML
4 INJECTION INTRAMUSCULAR; INTRAVENOUS
Status: DISCONTINUED | OUTPATIENT
Start: 2019-01-09 | End: 2019-01-21 | Stop reason: HOSPADM

## 2019-01-08 RX ADMIN — MIRTAZAPINE 30 MG: 30 TABLET, FILM COATED ORAL at 20:45

## 2019-01-08 RX ADMIN — ARIPIPRAZOLE 10 MG: 10 TABLET ORAL at 08:50

## 2019-01-08 RX ADMIN — VORTIOXETINE 20 MG: 20 TABLET, FILM COATED ORAL at 08:50

## 2019-01-08 RX ADMIN — TRAZODONE HYDROCHLORIDE 100 MG: 100 TABLET ORAL at 20:45

## 2019-01-08 ASSESSMENT — ACTIVITIES OF DAILY LIVING (ADL)
DRESS: SCRUBS (BEHAVIORAL HEALTH)
DRESS: INDEPENDENT
ORAL_HYGIENE: INDEPENDENT
HYGIENE/GROOMING: INDEPENDENT
LAUNDRY: WITH SUPERVISION
HYGIENE/GROOMING: INDEPENDENT
ORAL_HYGIENE: INDEPENDENT

## 2019-01-08 ASSESSMENT — MIFFLIN-ST. JEOR: SCORE: 1735.98

## 2019-01-08 NOTE — PLAN OF CARE
Pt spent time in her room bed resting then spent time in the lounge watching tv. She visited with her , daughter and family. She presents as blunted in affect, is withdrawn. Pt reported she had a stressful morning but the visit with her family made her evening better. She attended wrap up group with proper participation. Pt decided she will go through with the ECT.

## 2019-01-08 NOTE — PLAN OF CARE
Pt presents with calm mood and and depressed affect.  Pt reports feeling anxious about ECT tomorrow. Pt attended focus group, participating minimally.  Pt spent time journaling in her room.  During 1:1, pt expressed she is having difficulty concentrating.  Pt rated her anxiety a 7/10, and depression a 5/10.  Pt did not shower this shift.  Pt reported she is not sleeping well at night, saying she wakes up at night and cannot get back to sleep.  Pt denies thoughts of suicide or self harm.  Pt medication compliant.

## 2019-01-08 NOTE — PROGRESS NOTES
[facility]    Hospitalist Progress Note    Assessment & Plan   Misty Parham is a 33 year old female history of depressive disorder, anxiety disorder, and borderline personality disorder traits who presents as a direct admission from [facility] patient presented very depressed with thoughts of hopelessness, worthlessness, and current suicidal ideation with plan to overdose on medications.      Major Depressive disorder, recurrent  Suicidal ideation with plan in place, no attempt:   Longstanding hx of depression. This is her 3rd psychiatric admit in the past year for similar. Reports worsening depression with suicidal ideation and plan in place but no attempt. No prior suicide attempts but endorses ideation during prior admissions. Follows with [facility], direct admission from clinic today.  - med adjustments per psychiatry  - Basic labs including CMP, CBC, and TSH wnl. VSS.   - UA + leuk esterase, culture positive for urogenital teri, symptom free  - Utox negative     Hx insomnia: Takes trazodone 100mg PRN at bedtime, resume only per psychiatry.     Hx allergic rhinitis: Resume PTA zyrtec and Atarax PRN.     BMI 38 c/w Obese class II:   Follow up with PCP, once psychiatric issues are stable she should focus on diet and lifestyle changes. Given profound depression, it is not appropriate to formally  her at this time    ECT Clearance: Please refer to psychiatry notes for full details. After 5 days of inpatient psych care, ECT has been recommended by Dr. Larson. IM re-consulted for ECT clearance.   --EKG unremarkable - NSR, QTC interval 425, no ischemic changes, conduction issues or ectopy.   --Revised cardiac risk index score 0 - Class I risk = 0.4% risk of major cardiac event   --BMP, CBC unremarkable on admission         DVT Prophylaxis: Low Risk/Ambulatory with no VTE prophylaxis indicated  Code Status: Full Code    Interval History   Has been medically stable in  inpatient psychiatry, plans for ECT on 1/9.    -Data reviewed today: I reviewed all new labs and imaging results over the last 24 hours. I personally reviewed the EKG tracing showing NSR.    Physical Exam   Temp: 98  F (36.7  C) Temp src: Oral BP: 111/74 Pulse: 80   Resp: 16        Vitals:    01/03/19 1127 01/08/19 0825   Weight: 104.8 kg (231 lb 1.6 oz) 104 kg (229 lb 4.8 oz)     Vital Signs with Ranges  Temp:  [98  F (36.7  C)-98.1  F (36.7  C)] 98  F (36.7  C)  Pulse:  [80-81] 80  Resp:  [16] 16  BP: (111)/(73-74) 111/74  No intake/output data recorded.      Medications       ARIPiprazole  10 mg Oral Daily     mirtazapine  30 mg Oral At Bedtime     [START ON 1/9/2019] ondansetron  4 mg Intravenous Q Mon Wed Fri AM     traZODone  100 mg Oral At Bedtime     vortioxetine  20 mg Oral Daily       Data   Recent Labs   Lab 01/03/19  1328   WBC 7.5   HGB 12.1   MCV 88         POTASSIUM 3.7   CHLORIDE 108   CO2 25   BUN 10   CR 0.84   ANIONGAP 6   CRISTINE 8.7   GLC 93   ALBUMIN 3.7   PROTTOTAL 7.8   BILITOTAL 0.4   ALKPHOS 76   ALT 21   AST 20       Key Carreon, CNP  Text Page  (7 am - 6 pm)

## 2019-01-08 NOTE — H&P
[facility] Psychiatric Progress Note       Interim History   The patient's care was discussed with the treatment team and chart notes were reviewed. Per attending hospital staff, the patient continues to be withdrawn, isolative, and spends majority of her day in her room. The patient was shown ECT video, however she seemed indecisive and is concerned how ECT would effect her job. However towards the end of the day yesterday, patient agreed to undergo ECT treatment. Pt seen on 1/08/19 by Dr. Madrigal. Upon interview, the patient states she is doing better today. She states she should give ECT a try, thus we will schedule her first session for 1/09/19. The patient will undergo 6 sessions total.        Hospital Course   This is a 33 year old female with a history of major depression (recurrent and severe), anxiety, and borderline personality disorder. She has been hospitalized in the past in regards of her mental health, twice at  in 2018 and another at [facility] in 2015. Her most recent hospitalization being from 03/23/18 - 03/27/18. She follows Drew Juarez NP at [facility] The patient's Trintellix dosage was increased to 20mg daily, Dr. Madrigal would like to leave all other medications the same at this time. Over the weekend (per attending hospital staff note), the patient has been pleasant, cooperative, medication compliant, however isolative. She made it a goal of hers to be more social with her peers and to avoid her room. She has attended group sessions and participates appropriately. The patient did state that she was still feeling depressed and anxious, however has been denying any thoughts of suicide. The patient has been tolerating her current medication regiment well, however she has been experiencing some mild nausea after the increase in Trintellix made on 1/03. From this, Dr. Madrigal ordered Zofran 4mg to aid in this nausea. Dr. Madrigal also  "discussed ECT psychiatric treatment with patient. He informed her of the procedure itself and its possible effects such as short term memory loss (for a short period of time) and also reviews the benefits of this form of treatment. After a day of consideration, the patient agreed to undergo ECT treatment, thus her first session will be on 1/09/19.     Medications     Current Facility-Administered Medications Ordered in Epic   Medication Dose Route Frequency Last Rate Last Dose     acetaminophen (TYLENOL) tablet 650 mg  650 mg Oral Q4H PRN   650 mg at 01/03/19 2058     ARIPiprazole (ABILIFY) tablet 10 mg  10 mg Oral Daily   10 mg at 01/07/19 0914     cetirizine (zyrTEC) tablet 10 mg  10 mg Oral Daily PRN         hydrOXYzine (ATARAX) tablet 25 mg  25 mg Oral Q4H PRN         hydrOXYzine (ATARAX) tablet 25-50 mg  25-50 mg Oral BID PRN         mirtazapine (REMERON) tablet 30 mg  30 mg Oral At Bedtime   30 mg at 01/07/19 2134     ondansetron (ZOFRAN) tablet 4 mg  4 mg Oral Daily PRN         traZODone (DESYREL) tablet 100 mg  100 mg Oral At Bedtime   100 mg at 01/07/19 2134     vortioxetine (TRINTELLIX/BRINTELLIX) tablet 20 mg  20 mg Oral Daily   20 mg at 01/07/19 0914     No current [facility]-ordered outpatient medications on file.         Allergies      No Known Allergies     Medical Review of Systems     /73   Pulse 81   Temp 98.1  F (36.7  C) (Oral)   Resp 16   Ht 1.651 m (5' 5\")   Wt 104.8 kg (231 lb 1.6 oz)   BMI 38.46 kg/m    Body mass index is 38.46 kg/m .  A 10-point review of systems was performed by Zhang Madrigal MD and is negative, no new findings.      Psychiatric Examination     Appearance Sitting in chair, dressed in hospital scrubs. Appears stated age.   Attitude Cooperative   Orientation Oriented to person, place, time   Eye Contact Poor   Speech Regular rate, rhythm, volume and tone   Language Normal   Psychomotor Behavior Normal   Mood Depressed and anxious   Affect Flat and quiet  "   Thought Process Goal-Oriented, Intact   Associations Intact   Thought Content Patient is currently negative for suicidal ideation, negative for plan or intent, able to contract no self harm and identify barriers to suicide.  Negative for obsessions, compulsions or psychosis.     Fund of Knowledge Intact   Insight Intact   Judgement Intact   Attention Span & Concentration Intact   Recent & Remote Memory Intact   Gait Normal   Muscle Tone Intact        Labs     Labs reviewed.  No results found for this or any previous visit (from the past 24 hour(s)).     Impression   This is a 33 year old female with a history of major depression (recurrent and severe), anxiety, and borderline personality disorder. She has been hospitalized in the past in regards of her mental health, twice at  in 2018 and another at [facility] in 2015. Her most recent hospitalization being from 03/23/18 - 03/27/18. She follows Drew Juarez NP at [facility]. Patient continues to be pleasant, cooperative, and medication compliant. She however has been isolative and withdrawn. Patient has tolerated her current medication regiment well, not attaining any side effects or concerns. She has agreed to undergo ECT psychiatric treatment. Thus she will start on 1/09/19 and will continue with 6 sessions total. Dr. Madrigal will order Zofran 4mg IV for these ECT sessions.      Diagnoses   1. Major depression, recurrent, severe, without psychotic features.  2. Generalized Anxiety Disorder  3. Traits of Borderline Personality Disorder     Plan     1. Explained side effects, benefits, and complications of medications to the patient, Pt gave verbal consent.  2. Medication changes: no medication changes   3. Discussed treatment plan with patient and team.  4. Projected length of stay: 2 weeks  5. Start ECT treatment on 1/09/19      Attestation:   Patient has been seen and evaluated by me, Zhang Madrigal MD.    Patient ID:  Name:  Misty Parham    MRN: [id number]  Admission: 1/3/2019   YOB: 1985

## 2019-01-09 ENCOUNTER — ANESTHESIA (OUTPATIENT)
Dept: SURGERY | Facility: CLINIC | Age: 34
End: 2019-01-09

## 2019-01-09 ENCOUNTER — ANESTHESIA EVENT (OUTPATIENT)
Dept: SURGERY | Facility: CLINIC | Age: 34
End: 2019-01-09

## 2019-01-09 LAB — INTERPRETATION ECG - MUSE: NORMAL

## 2019-01-09 PROCEDURE — 25000132 ZZH RX MED GY IP 250 OP 250 PS 637: Performed by: PSYCHIATRY & NEUROLOGY

## 2019-01-09 PROCEDURE — 25000128 H RX IP 250 OP 636: Performed by: NURSE ANESTHETIST, CERTIFIED REGISTERED

## 2019-01-09 PROCEDURE — 25000125 ZZHC RX 250: Performed by: NURSE ANESTHETIST, CERTIFIED REGISTERED

## 2019-01-09 PROCEDURE — 12400000 ZZH R&B MH

## 2019-01-09 PROCEDURE — 25000131 ZZH RX MED GY IP 250 OP 636 PS 637: Performed by: PSYCHIATRY & NEUROLOGY

## 2019-01-09 PROCEDURE — 90870 ELECTROCONVULSIVE THERAPY: CPT

## 2019-01-09 RX ORDER — SODIUM CHLORIDE, SODIUM LACTATE, POTASSIUM CHLORIDE, CALCIUM CHLORIDE 600; 310; 30; 20 MG/100ML; MG/100ML; MG/100ML; MG/100ML
INJECTION, SOLUTION INTRAVENOUS ONCE
Status: COMPLETED | OUTPATIENT
Start: 2019-01-09 | End: 2019-01-11

## 2019-01-09 RX ORDER — KETOROLAC TROMETHAMINE 30 MG/ML
30 INJECTION, SOLUTION INTRAMUSCULAR; INTRAVENOUS
Status: ACTIVE | OUTPATIENT
Start: 2019-01-09 | End: 2019-01-14

## 2019-01-09 RX ADMIN — HYDROXYZINE HYDROCHLORIDE 25 MG: 25 TABLET ORAL at 09:36

## 2019-01-09 RX ADMIN — SUCCINYLCHOLINE CHLORIDE 100 MG: 20 INJECTION, SOLUTION INTRAMUSCULAR; INTRAVENOUS; PARENTERAL at 07:05

## 2019-01-09 RX ADMIN — MIRTAZAPINE 30 MG: 30 TABLET, FILM COATED ORAL at 21:26

## 2019-01-09 RX ADMIN — ACETAMINOPHEN 650 MG: 325 TABLET, FILM COATED ORAL at 09:36

## 2019-01-09 RX ADMIN — METHOHEXITAL SODIUM 100 MG: 500 INJECTION, POWDER, LYOPHILIZED, FOR SOLUTION INTRAMUSCULAR; INTRAVENOUS; RECTAL at 07:05

## 2019-01-09 RX ADMIN — TRAZODONE HYDROCHLORIDE 100 MG: 100 TABLET ORAL at 21:26

## 2019-01-09 RX ADMIN — VORTIOXETINE 20 MG: 20 TABLET, FILM COATED ORAL at 09:29

## 2019-01-09 RX ADMIN — ONDANSETRON 4 MG: 4 TABLET, FILM COATED ORAL at 09:36

## 2019-01-09 RX ADMIN — ARIPIPRAZOLE 10 MG: 10 TABLET ORAL at 09:30

## 2019-01-09 ASSESSMENT — ACTIVITIES OF DAILY LIVING (ADL)
ORAL_HYGIENE: INDEPENDENT
DRESS: INDEPENDENT
HYGIENE/GROOMING: INDEPENDENT

## 2019-01-09 ASSESSMENT — LIFESTYLE VARIABLES: TOBACCO_USE: 0

## 2019-01-09 ASSESSMENT — COPD QUESTIONNAIRES: COPD: 0

## 2019-01-09 NOTE — H&P
[facility] Psychiatric Progress Note       Interim History   The patient's care was discussed with the treatment team and chart notes were reviewed. Per attending hospital staff, the patient continues to be withdrawn, isolative, and spends majority of her day in her room. The patient underwent her first ECT session this morning. Pt seen on 1/08/19 by Dr. Madrigal. Upon interview, the patient states that ECT treatment well this morning. She complains of feeling a little nauseous and attains a slight headache. We ordered Zofran for ECT due to patient having a history of becoming nauseous, however it was not given. We will also order Toradol 30mg for the next ECT session.      Hospital Course   This is a 33 year old female with a history of major depression (recurrent and severe), anxiety, and borderline personality disorder. She has been hospitalized in the past in regards of her mental health, twice at  in 2018 and another at [facility] in 2015. Her most recent hospitalization being from 03/23/18 - 03/27/18. She follows Drew Juarez NP at [facility]. The patient's Trintellix dosage was increased to 20mg daily, Dr. Madrigal would like to leave all other medications the same at this time. Over the weekend (per attending hospital staff note), the patient has been pleasant, cooperative, medication compliant, however isolative. She made it a goal of hers to be more social with her peers and to avoid her room. She has attended group sessions and participates appropriately. The patient did state that she was still feeling depressed and anxious, however has been denying any thoughts of suicide. The patient has been tolerating her current medication regiment well, however she has been experiencing some mild nausea after the increase in Trintellix made on 1/03. From this, Dr. Madrigal ordered Zofran 4mg to aid in this nausea. Dr. Madrigal also discussed ECT psychiatric  "treatment with patient. He informed her of the procedure itself and its possible effects such as short term memory loss (for a short period of time) and also reviews the benefits of this form of treatment. After a day of consideration, the patient agreed to undergo ECT treatment, thus her first session will be on 1/09/19.     Medications     Current Facility-Administered Medications Ordered in Epic   Medication Dose Route Frequency Last Rate Last Dose     [Auto Hold] acetaminophen (TYLENOL) tablet 650 mg  650 mg Oral Q4H PRN   650 mg at 01/03/19 2058     [Auto Hold] ARIPiprazole (ABILIFY) tablet 10 mg  10 mg Oral Daily   10 mg at 01/08/19 0850     [Auto Hold] cetirizine (zyrTEC) tablet 10 mg  10 mg Oral Daily PRN         [Auto Hold] hydrOXYzine (ATARAX) tablet 25 mg  25 mg Oral Q4H PRN         [Auto Hold] hydrOXYzine (ATARAX) tablet 25-50 mg  25-50 mg Oral BID PRN         lactated ringers infusion   Intravenous Once         lidocaine 1 % 0.5 mg  0.5 mg Subcutaneous Once         [Auto Hold] mirtazapine (REMERON) tablet 30 mg  30 mg Oral At Bedtime   30 mg at 01/08/19 2045     [Auto Hold] ondansetron (ZOFRAN) injection 4 mg  4 mg Intravenous Q Mon Wed Fri AM         [Auto Hold] ondansetron (ZOFRAN) tablet 4 mg  4 mg Oral Daily PRN         [Auto Hold] traZODone (DESYREL) tablet 100 mg  100 mg Oral At Bedtime   100 mg at 01/08/19 2045     [Auto Hold] vortioxetine (TRINTELLIX/BRINTELLIX) tablet 20 mg  20 mg Oral Daily   20 mg at 01/08/19 0850     No current [facility]-ordered outpatient medications on file.         Allergies      No Known Allergies     Medical Review of Systems     /73   Pulse 93   Temp 98  F (36.7  C) (Oral)   Resp 12   Ht 1.651 m (5' 5\")   Wt 104 kg (229 lb 4.8 oz)   SpO2 99%   BMI 38.16 kg/m    Body mass index is 38.16 kg/m .  A 10-point review of systems was performed by Zhang Madrigal MD and is negative, no new findings.      Psychiatric Examination     Appearance Sitting in chair, " dressed in hospital scrubs. Appears stated age.   Attitude Cooperative   Orientation Oriented to person, place, time   Eye Contact Poor   Speech Regular rate, rhythm, volume and tone   Language Normal   Psychomotor Behavior Normal   Mood Less depressed and anxious   Affect Flat and quiet    Thought Process Goal-Oriented, Intact   Associations Intact   Thought Content Patient is currently negative for suicidal ideation, negative for plan or intent, able to contract no self harm and identify barriers to suicide.  Negative for obsessions, compulsions or psychosis.     Fund of Knowledge Intact   Insight Intact   Judgement Intact   Attention Span & Concentration Intact   Recent & Remote Memory Intact   Gait Normal   Muscle Tone Intact        Labs     Labs reviewed.  Recent Results (from the past 24 hour(s))   EKG 12-lead, tracing only    Collection Time: 01/08/19  9:11 AM   Result Value Ref Range    Interpretation ECG Click View Image link to view waveform and result         Impression   This is a 33 year old female with a history of major depression (recurrent and severe), anxiety, and borderline personality disorder. She has been hospitalized in the past in regards of her mental health, twice at  in 2018 and another at [facility] in 2015. Her most recent hospitalization being from 03/23/18 - 03/27/18. She follows Drew Juarez NP at [facility]. Patient continues to be pleasant, cooperative, and medication compliant. She however has been isolative and withdrawn. Patient has tolerated her current medication regiment well, not attaining any side effects or concerns. She underwent her first ECT session on 1/09/19 and will continue with her second session on 1/11/19. The patient tolerated her first ECT session well, only attaining a slight headache and mild nausea. We will order Toradol 30mg for the patient for her next sessions.      Diagnoses   1. Major depression, recurrent, severe, without  psychotic features.  2. Generalized Anxiety Disorder  3. Traits of Borderline Personality Disorder     Plan     1. Explained side effects, benefits, and complications of medications to the patient, Pt gave verbal consent.  2. Medication changes: no medication changes   3. Discussed treatment plan with patient and team.  4. Projected length of stay: 2 weeks  5. Start ECT treatment on 1/11/19, this will be her second session   6. Ordered Toradol 30mg for ECT sessions      Attestation:   Patient has been seen and evaluated by me, Zhang Madrigal MD.    Patient ID:  Name: Misty Parham    MRN: [id number]  Admission: 1/3/2019   YOB: 1985

## 2019-01-09 NOTE — PLAN OF CARE
Pt came back from ECT in the morning and presented a flat and depressed affect. Pt got her vitals, ate breakfast and went to nap and bedrest. Pt awoke before lunch; still presenting a depressed and flat affect. No groups. No shower; untidy. Respectful to peers and staff. Med compliant.

## 2019-01-09 NOTE — PLAN OF CARE
Pt continues to be depressed with flat affect withdrawn in room, and has not had significant improvement with medication. Pt viewed ECT video, had questions answered, and was visited this evening by . Pt stated feels a bit afraid, but has signed consent for ECT to begin tomorrow morning. Is cleared by hospitalist. Pt has been instructed to be NPO after midnight. Declined to attend group.

## 2019-01-09 NOTE — ANESTHESIA PREPROCEDURE EVALUATION
Anesthesia Pre-Procedure Evaluation    Patient: Misty Parham   MRN: [id number] : 1985          Preoperative Diagnosis: * No pre-op diagnosis entered *    * No procedures listed *    Past Medical History:   Diagnosis Date     Depressive disorder      No past surgical history on file.    Anesthesia Evaluation     . Pt has had prior anesthetic.     No history of anesthetic complications          ROS/MED HX    ENT/Pulmonary:      (-) tobacco use, asthma, COPD and sleep apnea   Neurologic:       Cardiovascular:        (-) hypertension and CAD   METS/Exercise Tolerance:     Hematologic:         Musculoskeletal:         GI/Hepatic:        (-) GERD   Renal/Genitourinary:  - ROS Renal section negative       Endo:  - neg endo ROS       Psychiatric:     (+) psychiatric history depression      Infectious Disease:         Malignancy:         Other:                          Physical Exam  Normal systems: cardiovascular and pulmonary    Airway   Mallampati: II  TM distance: >3 FB  Neck ROM: full    Dental   (+) chipped    Cardiovascular       Pulmonary             Lab Results   Component Value Date    WBC 7.5 2019    HGB 12.1 2019    HCT 37.3 2019     2019     2019    POTASSIUM 3.7 2019    CHLORIDE 108 2019    CO2 25 2019    BUN 10 2019    CR 0.84 2019    GLC 93 2019    CRISTINE 8.7 2019    ALBUMIN 3.7 2019    PROTTOTAL 7.8 2019    ALT 21 2019    AST 20 2019    ALKPHOS 76 2019    BILITOTAL 0.4 2019    TSH 0.80 2019    HCG Negative 2019       Preop Vitals  BP Readings from Last 3 Encounters:   19 107/73   18 107/70   18 119/74    Pulse Readings from Last 3 Encounters:   19 93   18 92      Resp Readings from Last 3 Encounters:   19 12   18 16   18 18    SpO2 Readings from Last 3 Encounters:   19 99%   18 99%   18 97%      Temp  "Readings from Last 1 Encounters:   01/09/19 36.7  C (98  F) (Oral)    Ht Readings from Last 1 Encounters:   01/03/19 1.651 m (5' 5\")      Wt Readings from Last 1 Encounters:   01/08/19 104 kg (229 lb 4.8 oz)    Estimated body mass index is 38.16 kg/m  as calculated from the following:    Height as of this encounter: 1.651 m (5' 5\").    Weight as of this encounter: 104 kg (229 lb 4.8 oz).       Anesthesia Plan      History & Physical Review  History and physical reviewed and following examination; no interval change.    ASA Status:  2 .    NPO Status:  > 8 hours    Plan for General with Other induction.   PONV prophylaxis:  Ondansetron (or other 5HT-3)       Postoperative Care  Postoperative pain management:  IV analgesics.      Consents  Anesthetic plan, risks, benefits and alternatives discussed with:  Patient..                 Jagdish Myers MD  "

## 2019-01-09 NOTE — PROCEDURES
[facility] ECT Procedure Note     Misty Parham [id number]   33 year old 1985     Patient Status: Inpatient    No Known Allergies    Weight:  229 lbs 4.8 oz    Patient Preparations: Other (comment)(not present)         Diagnosis:   Major depression       Indications for ECT:   Medications ineffective       Pause for the Cause:     Right patient Yes   Right procedure/laterality settings: Yes   Right diagnosis Yes          Intra-Procedure Documentation:     Date:  1/9/2019  Time:  6:57 AM    ECT #    Treatment number this series: 1   Total treatment number: 1   Type of ECT:  Bilateral, standard    ECT Medications administered: Brevital: 100mg  Succinyl Choline: 100mg         Clinical Narrative:     ECT was administered by Thymatron machine.  Pt has been medically cleared for procedure, consent signed. Side effects, Risks and benefits reviewed.    ECT Strip Summary:   Energy Level: 50 percent  Motor Seizure Duration: 51 seconds  EEG Seizure Duration: 51 seconds    Complications: No    Plan: 2nd ECT 1/11/19  Outpatient Psychiatrist: Drew Juarez NP

## 2019-01-09 NOTE — ANESTHESIA POSTPROCEDURE EVALUATION
Patient: Misty Parham    * No procedures listed *    Diagnosis:* No pre-op diagnosis entered *  Diagnosis Additional Information: No value filed.    Anesthesia Type:  General    Note:  Anesthesia Post Evaluation    Patient location during evaluation: PACU  Patient participation: Able to fully participate in evaluation  Level of consciousness: awake and alert  Pain management: adequate  Airway patency: patent  Cardiovascular status: acceptable  Respiratory status: acceptable and unassisted  Hydration status: acceptable  PONV: none             Last vitals:  Vitals:    01/08/19 1612 01/09/19 0550 01/09/19 0600   BP: 122/82 125/79 107/73   Pulse: 69 93    Resp: 16 16 12   Temp: 36.2  C (97.2  F) 36.7  C (98  F)    SpO2:   99%         Electronically Signed By: Luiza Lima MD  January 9, 2019  7:17 AM

## 2019-01-10 PROCEDURE — 90853 GROUP PSYCHOTHERAPY: CPT

## 2019-01-10 PROCEDURE — 97150 GROUP THERAPEUTIC PROCEDURES: CPT | Mod: GO

## 2019-01-10 PROCEDURE — 12400000 ZZH R&B MH

## 2019-01-10 PROCEDURE — 25000132 ZZH RX MED GY IP 250 OP 250 PS 637: Performed by: PSYCHIATRY & NEUROLOGY

## 2019-01-10 RX ADMIN — VORTIOXETINE 20 MG: 20 TABLET, FILM COATED ORAL at 09:54

## 2019-01-10 RX ADMIN — MIRTAZAPINE 30 MG: 30 TABLET, FILM COATED ORAL at 21:03

## 2019-01-10 RX ADMIN — TRAZODONE HYDROCHLORIDE 100 MG: 100 TABLET ORAL at 21:03

## 2019-01-10 RX ADMIN — ARIPIPRAZOLE 10 MG: 10 TABLET ORAL at 09:54

## 2019-01-10 ASSESSMENT — ACTIVITIES OF DAILY LIVING (ADL)
HYGIENE/GROOMING: INDEPENDENT
DRESS: INDEPENDENT
ORAL_HYGIENE: INDEPENDENT

## 2019-01-10 NOTE — PROGRESS NOTES
[facility] Psychiatric Progress Note       Interim History   The patient's care was discussed with the treatment team and chart notes were reviewed. Per attending hospital staff, the patient has been medication compliant, cooperative, pleasant, however continues to be isolative and quiet. Pt seen on 1/10/19 by Dr. Madrigal. Upon interview, the patient states she is doing alright. She will continue with ECT treatment, having her second session tomorrow, 1/11/19.      Hospital Course   This is a 33 year old female with a history of major depression (recurrent and severe), anxiety, and borderline personality disorder. She has been hospitalized in the past in regards of her mental health, twice at  in 2018 and another at [facility] in 2015. Her most recent hospitalization being from 03/23/18 - 03/27/18. She follows Drew Juarez NP at [facility]. The patient's Trintellix dosage was increased to 20mg daily, Dr. Madrigal would like to leave all other medications the same at this time. Over the weekend (per attending hospital staff note), the patient has been pleasant, cooperative, medication compliant, however isolative. She made it a goal of hers to be more social with her peers and to avoid her room. She has attended group sessions and participates appropriately. The patient did state that she was still feeling depressed and anxious, however has been denying any thoughts of suicide. The patient has been tolerating her current medication regiment well, however she has been experiencing some mild nausea after the increase in Trintellix made on 1/03. From this, Dr. Madrigal ordered Zofran 4mg to aid in this nausea. Dr. Madrigal also discussed ECT psychiatric treatment with patient. He informed her of the procedure itself and its possible effects such as short term memory loss (for a short period of time) and also reviews the benefits of this form of treatment. After a day  "of consideration, the patient agreed to undergo ECT treatment, thus her first session will be on 1/09/19. The patient tolerated her first ECT session well, only attaining a slight headache and mild nausea, Toradol and Zofran were ordered for her upcoming ECT sessions.      Medications     Current Facility-Administered Medications Ordered in Epic   Medication Dose Route Frequency Last Rate Last Dose     acetaminophen (TYLENOL) tablet 650 mg  650 mg Oral Q4H PRN   650 mg at 01/09/19 0936     ARIPiprazole (ABILIFY) tablet 10 mg  10 mg Oral Daily   10 mg at 01/10/19 0954     cetirizine (zyrTEC) tablet 10 mg  10 mg Oral Daily PRN         hydrOXYzine (ATARAX) tablet 25 mg  25 mg Oral Q4H PRN   25 mg at 01/09/19 0936     hydrOXYzine (ATARAX) tablet 25-50 mg  25-50 mg Oral BID PRN         ketorolac (TORADOL) injection 30 mg  30 mg Intravenous Q Mon Wed Fri AM         lactated ringers infusion   Intravenous Once   Stopped at 01/09/19 0932     lidocaine 1 % 0.5 mg  0.5 mg Subcutaneous Once         mirtazapine (REMERON) tablet 30 mg  30 mg Oral At Bedtime   30 mg at 01/09/19 2126     ondansetron (ZOFRAN) injection 4 mg  4 mg Intravenous Q Mon Wed Fri AM         ondansetron (ZOFRAN) tablet 4 mg  4 mg Oral Daily PRN   4 mg at 01/09/19 0936     traZODone (DESYREL) tablet 100 mg  100 mg Oral At Bedtime   100 mg at 01/09/19 2126     vortioxetine (TRINTELLIX/BRINTELLIX) tablet 20 mg  20 mg Oral Daily   20 mg at 01/10/19 0954     No current [facility]-ordered outpatient medications on file.         Allergies      No Known Allergies     Medical Review of Systems     /65   Pulse 81   Temp 97.8  F (36.6  C)   Resp 16   Ht 1.651 m (5' 5\")   Wt 104 kg (229 lb 4.8 oz)   SpO2 98%   BMI 38.16 kg/m    Body mass index is 38.16 kg/m .  A 10-point review of systems was performed by Zhang Madrigal MD and is negative, no new findings.      Psychiatric Examination     Appearance Sitting in chair, dressed in hospital scrubs. Appears " stated age.   Attitude Cooperative   Orientation Oriented to person, place, time   Eye Contact Good   Speech Regular rate, rhythm, volume and tone   Language Normal   Psychomotor Behavior Normal   Mood Less depressed and anxious   Affect Flat and quiet   Thought Process Goal-Oriented, Intact   Associations Intact   Thought Content Patient is currently negative for suicidal ideation, negative for plan or intent, able to contract no self harm and identify barriers to suicide.  Negative for obsessions, compulsions or psychosis.     Fund of Knowledge Intact   Insight Intact   Judgement Intact   Attention Span & Concentration Intact   Recent & Remote Memory Intact   Gait Normal   Muscle Tone Intact        Labs     Labs reviewed.  No results found for this or any previous visit (from the past 24 hour(s)).     Impression   This is a 33 year old female with a history of major depression (recurrent and severe), anxiety, and borderline personality disorder. She has been hospitalized in the past in regards of her mental health, twice at  in 2018 and another at [facility] in 2015. Her most recent hospitalization being from 03/23/18 - 03/27/18. She follows Drew Juarez NP at [facility]. Patient continues to be pleasant, cooperative, and medication compliant. She however has been isolative and withdrawn. Patient has tolerated her current medication regiment well, not attaining any side effects or concerns. She underwent her first ECT session on 1/09/19 and will continue with her second session on 1/11/19.       Diagnoses   1. Major depression, recurrent, severe, without psychotic features.  2. Generalized Anxiety Disorder  3. Traits of Borderline Personality Disorder     Plan     1. Explained side effects, benefits, and complications of medications to the patient, Pt gave verbal consent.  2. Medication changes: no medication changes   3. Discussed treatment plan with patient and team.  4. Projected  length of stay: 2 weeks  5. ECT session on 1/11/19, this will be her second session       Attestation:   Patient has been seen and evaluated by me, Zhang Madrigal MD.    Patient ID:  Name: Misty Parham    MRN: [id number]  Admission: 1/3/2019   YOB: 1985

## 2019-01-10 NOTE — PLAN OF CARE
Pt presented with a flat - blunt affect and depressed mood. Pt reported feeling tired today from her ECT and spent half of the shift napping/bed resting. Pt also stated she feels a little better compared to when she came in and is trying her best to overcome her social anxiety. Later in the shift, pt attended the wrap up group and watched a movie with peers. Pt ate her dinner, was med complaint and denied SI.

## 2019-01-10 NOTE — PLAN OF CARE
Pt has been present in the SDU throughout the day shift. Presents a flat, depressed and withdrawn affect. Attended groups and OT; participated minimally and accordingly. No shower; untidy. Respectful to peers and staff. Med compliant.

## 2019-01-10 NOTE — PLAN OF CARE
Pt transitioned well to OT group which addressed illness management through identification of sensory coping skills. With direct VCs, pt participated in brainstorming activity identifying the five senses and how senses can either be calming or alerting. Educated pt on use of the five senses for coping and facilitated trial of various sensory coping skills for each of the five senses. Pt identified using sense of smell (e.g. Essential oils) as coping strategy she would like to implement in the future. Pt will continue to benefit from OT intervention to address implementation of positive functional coping skills, role performance, and community reintegration.

## 2019-01-11 ENCOUNTER — ANESTHESIA (OUTPATIENT)
Dept: SURGERY | Facility: CLINIC | Age: 34
End: 2019-01-11

## 2019-01-11 ENCOUNTER — ANESTHESIA EVENT (OUTPATIENT)
Dept: SURGERY | Facility: CLINIC | Age: 34
End: 2019-01-11

## 2019-01-11 PROCEDURE — 12400000 ZZH R&B MH

## 2019-01-11 PROCEDURE — 90870 ELECTROCONVULSIVE THERAPY: CPT

## 2019-01-11 PROCEDURE — 25000125 ZZHC RX 250: Performed by: REGISTERED NURSE

## 2019-01-11 PROCEDURE — 25000128 H RX IP 250 OP 636: Performed by: ANESTHESIOLOGY

## 2019-01-11 PROCEDURE — 97150 GROUP THERAPEUTIC PROCEDURES: CPT | Mod: GO

## 2019-01-11 PROCEDURE — 25000128 H RX IP 250 OP 636: Performed by: REGISTERED NURSE

## 2019-01-11 PROCEDURE — 25000132 ZZH RX MED GY IP 250 OP 250 PS 637: Performed by: PSYCHIATRY & NEUROLOGY

## 2019-01-11 RX ORDER — MEPERIDINE HYDROCHLORIDE 25 MG/ML
12.5 INJECTION INTRAMUSCULAR; INTRAVENOUS; SUBCUTANEOUS EVERY 5 MIN PRN
Status: DISCONTINUED | OUTPATIENT
Start: 2019-01-11 | End: 2019-01-11 | Stop reason: HOSPADM

## 2019-01-11 RX ORDER — SODIUM CHLORIDE, SODIUM LACTATE, POTASSIUM CHLORIDE, CALCIUM CHLORIDE 600; 310; 30; 20 MG/100ML; MG/100ML; MG/100ML; MG/100ML
INJECTION, SOLUTION INTRAVENOUS CONTINUOUS
Status: DISCONTINUED | OUTPATIENT
Start: 2019-01-11 | End: 2019-01-11 | Stop reason: HOSPADM

## 2019-01-11 RX ORDER — ONDANSETRON 4 MG/1
4 TABLET, ORALLY DISINTEGRATING ORAL EVERY 30 MIN PRN
Status: DISCONTINUED | OUTPATIENT
Start: 2019-01-11 | End: 2019-01-11 | Stop reason: HOSPADM

## 2019-01-11 RX ORDER — NALOXONE HYDROCHLORIDE 0.4 MG/ML
.1-.4 INJECTION, SOLUTION INTRAMUSCULAR; INTRAVENOUS; SUBCUTANEOUS
Status: CANCELLED | OUTPATIENT
Start: 2019-01-11 | End: 2019-01-12

## 2019-01-11 RX ORDER — LABETALOL HYDROCHLORIDE 5 MG/ML
10 INJECTION, SOLUTION INTRAVENOUS
Status: DISCONTINUED | OUTPATIENT
Start: 2019-01-11 | End: 2019-01-11 | Stop reason: HOSPADM

## 2019-01-11 RX ORDER — ONDANSETRON 2 MG/ML
4 INJECTION INTRAMUSCULAR; INTRAVENOUS EVERY 30 MIN PRN
Status: DISCONTINUED | OUTPATIENT
Start: 2019-01-11 | End: 2019-01-11 | Stop reason: HOSPADM

## 2019-01-11 RX ORDER — FENTANYL CITRATE 50 UG/ML
25-50 INJECTION, SOLUTION INTRAMUSCULAR; INTRAVENOUS
Status: DISCONTINUED | OUTPATIENT
Start: 2019-01-11 | End: 2019-01-11 | Stop reason: HOSPADM

## 2019-01-11 RX ADMIN — VORTIOXETINE 20 MG: 20 TABLET, FILM COATED ORAL at 08:04

## 2019-01-11 RX ADMIN — MIRTAZAPINE 30 MG: 30 TABLET, FILM COATED ORAL at 21:03

## 2019-01-11 RX ADMIN — METHOHEXITAL SODIUM 100 MG: 500 INJECTION, POWDER, LYOPHILIZED, FOR SOLUTION INTRAMUSCULAR; INTRAVENOUS; RECTAL at 06:27

## 2019-01-11 RX ADMIN — SODIUM CHLORIDE, POTASSIUM CHLORIDE, SODIUM LACTATE AND CALCIUM CHLORIDE: 600; 310; 30; 20 INJECTION, SOLUTION INTRAVENOUS at 06:20

## 2019-01-11 RX ADMIN — SUCCINYLCHOLINE CHLORIDE 100 MG: 20 INJECTION, SOLUTION INTRAMUSCULAR; INTRAVENOUS; PARENTERAL at 06:27

## 2019-01-11 RX ADMIN — ARIPIPRAZOLE 10 MG: 10 TABLET ORAL at 08:04

## 2019-01-11 RX ADMIN — TRAZODONE HYDROCHLORIDE 100 MG: 100 TABLET ORAL at 21:03

## 2019-01-11 ASSESSMENT — ACTIVITIES OF DAILY LIVING (ADL)
DRESS: STREET CLOTHES
ORAL_HYGIENE: INDEPENDENT
LAUNDRY: WITH SUPERVISION
HYGIENE/GROOMING: INDEPENDENT

## 2019-01-11 ASSESSMENT — LIFESTYLE VARIABLES: TOBACCO_USE: 0

## 2019-01-11 ASSESSMENT — COPD QUESTIONNAIRES: COPD: 0

## 2019-01-11 NOTE — PROCEDURES
[facility] ECT Procedure Note     Misty Parham [id number]   33 year old 1985     Patient Status: Inpatient    No Known Allergies    Weight:  229 lbs 4.8 oz    Patient Preparations: Other (comment)(not present)         Diagnosis:   Major depression       Indications for ECT:   Medications ineffective       Pause for the Cause:     Right patient Yes   Right procedure/laterality settings: Yes   Right diagnosis Yes          Intra-Procedure Documentation:     Date:  1/11/2019  Time:  6:57 AM    ECT #    Treatment number this series: 2   Total treatment number: 2   Type of ECT:  Bilateral, standard    ECT Medications administered: Brevital: 100mg  Succinyl Choline: 100mg         Clinical Narrative:     ECT was administered by Thymatron machine.  Pt has been medically cleared for procedure, consent signed. Side effects, Risks and benefits reviewed.    ECT Strip Summary:   Energy Level: 60 percent  Motor Seizure Duration: 31 seconds  EEG Seizure Duration: 31 seconds    Complications: No    Plan: 3rd ECT 1/14/19  Outpatient Psychiatrist: Drew Juarez NP

## 2019-01-11 NOTE — ANESTHESIA PREPROCEDURE EVALUATION
Anesthesia Pre-Procedure Evaluation    Patient: Misty Parham   MRN: [id number] : 1985          Preoperative Diagnosis: * No pre-op diagnosis entered *    * No procedures listed *    Past Medical History:   Diagnosis Date     Depressive disorder      No past surgical history on file.    Anesthesia Evaluation     . Pt has had prior anesthetic.     No history of anesthetic complications          ROS/MED HX    ENT/Pulmonary:      (-) tobacco use, asthma, COPD and sleep apnea   Neurologic:       Cardiovascular:        (-) hypertension and CAD   METS/Exercise Tolerance:     Hematologic:         Musculoskeletal:         GI/Hepatic:        (-) GERD   Renal/Genitourinary:  - ROS Renal section negative       Endo:  - neg endo ROS       Psychiatric:     (+) psychiatric history depression      Infectious Disease:         Malignancy:         Other:                            Physical Exam  Normal systems: cardiovascular and pulmonary    Airway   Mallampati: II  TM distance: >3 FB  Neck ROM: full    Dental   (+) chipped    Cardiovascular       Pulmonary             Lab Results   Component Value Date    WBC 7.5 2019    HGB 12.1 2019    HCT 37.3 2019     2019     2019    POTASSIUM 3.7 2019    CHLORIDE 108 2019    CO2 25 2019    BUN 10 2019    CR 0.84 2019    GLC 93 2019    CRISTINE 8.7 2019    ALBUMIN 3.7 2019    PROTTOTAL 7.8 2019    ALT 21 2019    AST 20 2019    ALKPHOS 76 2019    BILITOTAL 0.4 2019    TSH 0.80 2019    HCG Negative 2019       Preop Vitals  BP Readings from Last 3 Encounters:   19 115/69   18 107/70   18 119/74    Pulse Readings from Last 3 Encounters:   01/10/19 86   18 92      Resp Readings from Last 3 Encounters:   19 16   18 16   18 18    SpO2 Readings from Last 3 Encounters:   19 98%   18 99%   18 97%      Temp  "Readings from Last 1 Encounters:   01/11/19 36.8  C (98.2  F) (Temporal)    Ht Readings from Last 1 Encounters:   01/03/19 1.651 m (5' 5\")      Wt Readings from Last 1 Encounters:   01/08/19 104 kg (229 lb 4.8 oz)    Estimated body mass index is 38.16 kg/m  as calculated from the following:    Height as of 1/3/19: 1.651 m (5' 5\").    Weight as of 1/8/19: 104 kg (229 lb 4.8 oz).       Anesthesia Plan      History & Physical Review  History and physical reviewed and following examination; no interval change.    ASA Status:  2 .    NPO Status:  > 8 hours    Plan for General with Other induction.   PONV prophylaxis:  Ondansetron (or other 5HT-3)       Postoperative Care  Postoperative pain management:  IV analgesics.      Consents  Anesthetic plan, risks, benefits and alternatives discussed with:  Patient..                   Jagdish Mohamud, DO, DO  "

## 2019-01-11 NOTE — PLAN OF CARE
With initial task set up and MIN encouragement, pt participated in therapeutic group activity and discussion addressing wellness. Pt ID'd positive supports in her life and with MIN, ID'd ways to build positive supports (e.g. Support groups). Additionally, ID'd goal for the year as to move out of her mom's house. With MIN A, ID'd strategies to work towards same as to follow a budget to save up money, and to search online for affordable options. Pt will continue to benefit from OT intervention to address implementation of positive functional coping skills, role performance, and community reintegration.

## 2019-01-11 NOTE — PLAN OF CARE
BEHAVIORAL TEAM DISCUSSION    Participants: Dr. Madrigal, RN's, Casemanagers and Psych associates   Progress: Improving  Continued Stay Criteria/Rationale: Continue series of ECT  Medical/Physical: Depression  Precautions:   Behavioral Orders   Procedures     Code 1 - Restrict to Unit     Electroconvulsive therapy     Electroconvulsive therapy     Series of up to 6 treatments. Begin Date: 1/09/19   Treating Psychiatrist providing ECT:  Dr. Kwon/Kaitlin     Notified on:  1/08/19     Electroconvulsive therapy     VO from DR. Gloria KINNEY to create plan later today     Fall precautions     Routine Programming     As clinically indicated     Status 15     Every 15 minutes.     Plan: ECT #2 on 1/11/19  Rationale for change in precautions or plan: Good response to ECT #1 will continue with series of 6 treatments.

## 2019-01-11 NOTE — PLAN OF CARE
"Pt presents w/ a flat affect and depressed mood.  She was withdrawn and did not socialize with staff or peers.  She spent the shift watching TV in the lounge and reading in her room.  During staff check in she said she is doing \"okay\" and \"a little depressed.\"  Pt attended wrap-up group.  She denies SI.  "

## 2019-01-11 NOTE — PLAN OF CARE
"Patient had ECT today. She denies suicidal ideation.  States that she feels much better and feels happy. \"I have less negative thoughts and feel more content.\" Her memory is slightly impaired as she did not know the month or year. She did shower.  "

## 2019-01-11 NOTE — ANESTHESIA POSTPROCEDURE EVALUATION
Patient: Misty Parham    * No procedures listed *    Diagnosis:* No pre-op diagnosis entered *  Diagnosis Additional Information: No value filed.    Anesthesia Type:  General    Note:  Anesthesia Post Evaluation    Patient location during evaluation: bedside  Patient participation: Able to fully participate in evaluation  Level of consciousness: awake  Pain management: adequate  Airway patency: patent  Cardiovascular status: acceptable  Respiratory status: acceptable  Hydration status: acceptable  PONV: none     Anesthetic complications: None    Comments: No anesthetic complications noted.         Last vitals:  Vitals:    01/11/19 0705 01/11/19 0715 01/11/19 1556   BP: 121/64 124/84 103/79   Pulse: 73 65    Resp: 15 15 16   Temp: 37.3  C (99.1  F) 36.9  C (98.5  F) 36.4  C (97.5  F)   SpO2: 97%           Electronically Signed By: Jagdish Mohamud DO, DO  January 11, 2019  4:17 PM

## 2019-01-11 NOTE — PROGRESS NOTES
[facility] Psychiatric Progress Note       Interim History   The patient's care was discussed with the treatment team and chart notes were reviewed. Per attending hospital staff, the patient has been medication compliant, cooperative, pleasant, however continues to be isolative and quiet. Pt seen on 1/11/19 by Dr. Madrigal. Upon interview, the patient states she is doing fine today. She had ECT this morning, which went well with no complications. She will continue with her third session on 1/14/19. She has tolerated her current medication regiment well, thus Dr. Madrigal will not make any changes to the patients medications.      Hospital Course   This is a 33 year old female with a history of major depression (recurrent and severe), anxiety, and borderline personality disorder. She has been hospitalized in the past in regards of her mental health, twice at  in 2018 and another at [facility] in 2015. Her most recent hospitalization being from 03/23/18 - 03/27/18. She follows Drew Juarez NP at [facility]. The patient's Trintellix dosage was increased to 20mg daily, Dr. Madrigal would like to leave all other medications the same at this time. Over the weekend (per attending hospital staff note), the patient has been pleasant, cooperative, medication compliant, however isolative. She made it a goal of hers to be more social with her peers and to avoid her room. She has attended group sessions and participates appropriately. The patient did state that she was still feeling depressed and anxious, however has been denying any thoughts of suicide. The patient has been tolerating her current medication regiment well, however she has been experiencing some mild nausea after the increase in Trintellix made on 1/03. From this, Dr. Madrigal ordered Zofran 4mg to aid in this nausea. Dr. Madrigal also discussed ECT psychiatric treatment with patient. He informed her of the  procedure itself and its possible effects such as short term memory loss (for a short period of time) and also reviews the benefits of this form of treatment. After a day of consideration, the patient agreed to undergo ECT treatment, thus her first session will be on 1/09/19. The patient tolerated her first ECT session well, only attaining a slight headache and mild nausea, Toradol and Zofran were ordered for her upcoming ECT sessions.      Medications     Current Facility-Administered Medications Ordered in Epic   Medication Dose Route Frequency Last Rate Last Dose     [Auto Hold] acetaminophen (TYLENOL) tablet 650 mg  650 mg Oral Q4H PRN   650 mg at 01/09/19 0936     [Auto Hold] ARIPiprazole (ABILIFY) tablet 10 mg  10 mg Oral Daily   10 mg at 01/10/19 0954     [Auto Hold] cetirizine (zyrTEC) tablet 10 mg  10 mg Oral Daily PRN         fentaNYL (PF) (SUBLIMAZE) injection 25-50 mcg  25-50 mcg Intravenous Q2 Min PRN         [Auto Hold] hydrOXYzine (ATARAX) tablet 25 mg  25 mg Oral Q4H PRN   25 mg at 01/09/19 0936     [Auto Hold] hydrOXYzine (ATARAX) tablet 25-50 mg  25-50 mg Oral BID PRN         [Auto Hold] ketorolac (TORADOL) injection 30 mg  30 mg Intravenous Q Mon Wed Fri AM         labetalol (NORMODYNE/TRANDATE) injection 10 mg  10 mg Intravenous Once PRN         lactated ringers infusion   Intravenous Continuous         lidocaine 1 % 0.5 mg  0.5 mg Subcutaneous Once         meperidine (DEMEROL) injection 12.5 mg  12.5 mg Intravenous Q5 Min PRN         [Auto Hold] mirtazapine (REMERON) tablet 30 mg  30 mg Oral At Bedtime   30 mg at 01/10/19 2103     ondansetron (ZOFRAN-ODT) ODT tab 4 mg  4 mg Oral Q30 Min PRN        Or     ondansetron (ZOFRAN) injection 4 mg  4 mg Intravenous Q30 Min PRN         [Auto Hold] ondansetron (ZOFRAN) injection 4 mg  4 mg Intravenous Q Mon Wed Fri AM         [Auto Hold] ondansetron (ZOFRAN) tablet 4 mg  4 mg Oral Daily PRN   4 mg at 01/09/19 0936     prochlorperazine (COMPAZINE)  "injection 10 mg  10 mg Intravenous Q6H PRN         [Auto Hold] traZODone (DESYREL) tablet 100 mg  100 mg Oral At Bedtime   100 mg at 01/10/19 2103     [Auto Hold] vortioxetine (TRINTELLIX/BRINTELLIX) tablet 20 mg  20 mg Oral Daily   20 mg at 01/10/19 0954     No current Epic-ordered outpatient medications on file.         Allergies      No Known Allergies     Medical Review of Systems     /65   Pulse 78   Temp 99.4  F (37.4  C)   Resp 16   Ht 1.651 m (5' 5\")   Wt 104 kg (229 lb 4.8 oz)   SpO2 97%   BMI 38.16 kg/m    Body mass index is 38.16 kg/m .  A 10-point review of systems was performed by Zhang Madrigal MD and is negative, no new findings.      Psychiatric Examination     Appearance Sitting in chair, dressed in hospital scrubs. Appears stated age.   Attitude Cooperative   Orientation Oriented to person, place, time   Eye Contact Good   Speech Regular rate, rhythm, volume and tone   Language Normal   Psychomotor Behavior Normal   Mood Less depressed and anxious   Affect Flat and quiet   Thought Process Goal-Oriented, Intact   Associations Intact   Thought Content Patient is currently negative for suicidal ideation, negative for plan or intent, able to contract no self harm and identify barriers to suicide.  Negative for obsessions, compulsions or psychosis.     Fund of Knowledge Intact   Insight Intact   Judgement Intact   Attention Span & Concentration Intact   Recent & Remote Memory Intact   Gait Normal   Muscle Tone Intact        Labs     Labs reviewed.  No results found for this or any previous visit (from the past 24 hour(s)).     Impression   This is a 33 year old female with a history of major depression (recurrent and severe), anxiety, and borderline personality disorder. She has been hospitalized in the past in regards of her mental health, twice at  in 2018 and another at [facility] in 2015. Her most recent hospitalization being from 03/23/18 - 03/27/18. She follows " Drew Juarez NP at [facility]. Patient continues to be pleasant, cooperative, and medication compliant. She however has been isolative and withdrawn. Patient has tolerated her current medication regiment well, not attaining any side effects or concerns. There will be no medication changes made. She underwent her first ECT session on 1/09/19 and will continue with her third session on 1/14/19.       Diagnoses   1. Major depression, recurrent, severe, without psychotic features.  2. Generalized Anxiety Disorder  3. Traits of Borderline Personality Disorder     Plan     1. Explained side effects, benefits, and complications of medications to the patient, Pt gave verbal consent.  2. Medication changes: no medication changes   3. Discussed treatment plan with patient and team.  4. Projected length of stay: 2 weeks  5. ECT session on 1/14/19, this will be her third session       Attestation:   Patient has been seen and evaluated by me, Zhang Madrigal MD.    Patient ID:  Name: Misty Parham    MRN: [id number]  Admission: 1/3/2019   YOB: 1985

## 2019-01-12 PROCEDURE — 90853 GROUP PSYCHOTHERAPY: CPT

## 2019-01-12 PROCEDURE — 12400000 ZZH R&B MH

## 2019-01-12 PROCEDURE — 25000132 ZZH RX MED GY IP 250 OP 250 PS 637: Performed by: PSYCHIATRY & NEUROLOGY

## 2019-01-12 RX ADMIN — MIRTAZAPINE 30 MG: 30 TABLET, FILM COATED ORAL at 21:05

## 2019-01-12 RX ADMIN — VORTIOXETINE 20 MG: 20 TABLET, FILM COATED ORAL at 09:08

## 2019-01-12 RX ADMIN — ARIPIPRAZOLE 10 MG: 10 TABLET ORAL at 09:08

## 2019-01-12 RX ADMIN — TRAZODONE HYDROCHLORIDE 100 MG: 100 TABLET ORAL at 21:05

## 2019-01-12 ASSESSMENT — ACTIVITIES OF DAILY LIVING (ADL)
LAUNDRY: WITH SUPERVISION
LAUNDRY: WITH SUPERVISION
HYGIENE/GROOMING: INDEPENDENT
HYGIENE/GROOMING: INDEPENDENT
ORAL_HYGIENE: INDEPENDENT
DRESS: STREET CLOTHES
DRESS: STREET CLOTHES
ORAL_HYGIENE: INDEPENDENT

## 2019-01-12 NOTE — PLAN OF CARE
"Pleasant and cooperative.  Visible on the unit. Withdrawn. Attended wrap up group and participated appropriately.  Reports that she is feeling \"much better.\"  Feels that ECT is helping.  Calm in mood.  Blunted in affect. Med compliant.  "

## 2019-01-12 NOTE — PLAN OF CARE
Patient states she is feeling better since starting ECT and has less suicidal feelings. She has vague suicidal thoughts of not wanting to be here. Pt tends to isolate in her room but is pleasant and cooperative. Pt does state that there is stress in the home and has 2 children 15 y.o. And 12 y.o  and she lives with her mother who is a functional Alcoholic because  she cannot support  her family. Plan for ECT # 3 Monday

## 2019-01-13 PROCEDURE — 12400000 ZZH R&B MH

## 2019-01-13 PROCEDURE — 25000132 ZZH RX MED GY IP 250 OP 250 PS 637: Performed by: PSYCHIATRY & NEUROLOGY

## 2019-01-13 RX ADMIN — VORTIOXETINE 20 MG: 20 TABLET, FILM COATED ORAL at 09:08

## 2019-01-13 RX ADMIN — TRAZODONE HYDROCHLORIDE 100 MG: 100 TABLET ORAL at 21:05

## 2019-01-13 RX ADMIN — MIRTAZAPINE 30 MG: 30 TABLET, FILM COATED ORAL at 21:05

## 2019-01-13 RX ADMIN — ARIPIPRAZOLE 10 MG: 10 TABLET ORAL at 09:08

## 2019-01-13 ASSESSMENT — ACTIVITIES OF DAILY LIVING (ADL)
LAUNDRY: WITH SUPERVISION
HYGIENE/GROOMING: INDEPENDENT
DRESS: STREET CLOTHES
HYGIENE/GROOMING: INDEPENDENT
ORAL_HYGIENE: INDEPENDENT
DRESS: STREET CLOTHES
LAUNDRY: WITH SUPERVISION
ORAL_HYGIENE: INDEPENDENT

## 2019-01-13 NOTE — PLAN OF CARE
Pleasant and cooperative.  Visible on the unit.  Attended groups and participated appropriately.  She has been journaling today.  Reports that she is doing better.  Brightened some during interaction.  Calm in mood. Denied SI.  Med compliant.

## 2019-01-13 NOTE — PLAN OF CARE
Patient denies suicidal ideation.  Patient is quiet and withdrawn but states she feels a lot better than when she came in.  She is social with peers when out of room.  Attending groups.

## 2019-01-14 ENCOUNTER — ANESTHESIA EVENT (OUTPATIENT)
Dept: SURGERY | Facility: CLINIC | Age: 34
End: 2019-01-14

## 2019-01-14 ENCOUNTER — ANESTHESIA (OUTPATIENT)
Dept: SURGERY | Facility: CLINIC | Age: 34
End: 2019-01-14

## 2019-01-14 PROCEDURE — 25000128 H RX IP 250 OP 636: Performed by: NURSE ANESTHETIST, CERTIFIED REGISTERED

## 2019-01-14 PROCEDURE — 25000132 ZZH RX MED GY IP 250 OP 250 PS 637: Performed by: PSYCHIATRY & NEUROLOGY

## 2019-01-14 PROCEDURE — 25000128 H RX IP 250 OP 636: Performed by: PSYCHIATRY & NEUROLOGY

## 2019-01-14 PROCEDURE — 90853 GROUP PSYCHOTHERAPY: CPT

## 2019-01-14 PROCEDURE — 90870 ELECTROCONVULSIVE THERAPY: CPT

## 2019-01-14 PROCEDURE — 25000125 ZZHC RX 250: Performed by: ANESTHESIOLOGY

## 2019-01-14 PROCEDURE — 25000125 ZZHC RX 250: Performed by: NURSE ANESTHETIST, CERTIFIED REGISTERED

## 2019-01-14 PROCEDURE — 12400000 ZZH R&B MH

## 2019-01-14 RX ORDER — SODIUM CHLORIDE, SODIUM LACTATE, POTASSIUM CHLORIDE, CALCIUM CHLORIDE 600; 310; 30; 20 MG/100ML; MG/100ML; MG/100ML; MG/100ML
INJECTION, SOLUTION INTRAVENOUS CONTINUOUS
Status: DISCONTINUED | OUTPATIENT
Start: 2019-01-14 | End: 2019-01-21 | Stop reason: HOSPADM

## 2019-01-14 RX ORDER — KETOROLAC TROMETHAMINE 30 MG/ML
30 INJECTION, SOLUTION INTRAMUSCULAR; INTRAVENOUS
Status: COMPLETED | OUTPATIENT
Start: 2019-01-14 | End: 2019-01-16

## 2019-01-14 RX ADMIN — VORTIOXETINE 20 MG: 20 TABLET, FILM COATED ORAL at 07:46

## 2019-01-14 RX ADMIN — LIDOCAINE HYDROCHLORIDE 0.5 MG: 10 INJECTION, SOLUTION EPIDURAL; INFILTRATION; INTRACAUDAL; PERINEURAL at 06:20

## 2019-01-14 RX ADMIN — SUCCINYLCHOLINE CHLORIDE 100 MG: 20 INJECTION, SOLUTION INTRAMUSCULAR; INTRAVENOUS; PARENTERAL at 06:32

## 2019-01-14 RX ADMIN — ARIPIPRAZOLE 10 MG: 10 TABLET ORAL at 07:46

## 2019-01-14 RX ADMIN — KETOROLAC TROMETHAMINE 30 MG: 30 INJECTION, SOLUTION INTRAMUSCULAR at 06:28

## 2019-01-14 RX ADMIN — MIRTAZAPINE 30 MG: 30 TABLET, FILM COATED ORAL at 20:59

## 2019-01-14 RX ADMIN — SODIUM CHLORIDE, POTASSIUM CHLORIDE, SODIUM LACTATE AND CALCIUM CHLORIDE 500 ML: 600; 310; 30; 20 INJECTION, SOLUTION INTRAVENOUS at 06:26

## 2019-01-14 RX ADMIN — TRAZODONE HYDROCHLORIDE 100 MG: 100 TABLET ORAL at 20:59

## 2019-01-14 RX ADMIN — METHOHEXITAL SODIUM 100 MG: 500 INJECTION, POWDER, LYOPHILIZED, FOR SOLUTION INTRAMUSCULAR; INTRAVENOUS; RECTAL at 06:32

## 2019-01-14 ASSESSMENT — ACTIVITIES OF DAILY LIVING (ADL)
HYGIENE/GROOMING: INDEPENDENT
HYGIENE/GROOMING: INDEPENDENT
LAUNDRY: WITH SUPERVISION
LAUNDRY: WITH SUPERVISION
ORAL_HYGIENE: INDEPENDENT
DRESS: INDEPENDENT
DRESS: INDEPENDENT
ORAL_HYGIENE: INDEPENDENT

## 2019-01-14 NOTE — PROCEDURES
[facility] ECT Procedure Note     Misty Parham [id number]   33 year old 1985     Patient Status: Inpatient    No Known Allergies    Weight:  229 lbs 4.8 oz    Patient Preparations: Other (comment)(not present)         Diagnosis:   Major depression       Indications for ECT:   Medications ineffective       Pause for the Cause:     Right patient Yes   Right procedure/laterality settings: Yes   Right diagnosis Yes          Intra-Procedure Documentation:     Date:  1/14/2019  Time:  6:57 AM    ECT #    Treatment number this series: 3   Total treatment number: 3   Type of ECT:  Bilateral, standard    ECT Medications administered: Brevital: 100mg  Succinyl Choline: 100mg   Toradol 30 mg         Clinical Narrative:     ECT was administered by Thymatron machine.  Pt has been medically cleared for procedure, consent signed. Side effects, Risks and benefits reviewed.    ECT Strip Summary:   Energy Level: 70 percent  Motor Seizure Duration: 31 seconds  EEG Seizure Duration: 31 seconds    Complications: No    Plan: 4th ECT 1/16/19  Outpatient Psychiatrist: Drew Juarez NP

## 2019-01-14 NOTE — PLAN OF CARE
BEHAVIORAL TEAM DISCUSSION    Participants: Psychiatrist, RNs, PAs, and CMs  Progress: Improving  Continued Stay Criteria/Rationale: Continue ECT Series  Medical/Physical: None identified  Precautions:   Behavioral Orders   Procedures    Code 1 - Restrict to Unit    Electroconvulsive therapy    Electroconvulsive therapy     Series of up to 6 treatments. Begin Date: 1/09/19   Treating Psychiatrist providing ECT:  Dr. García     Notified on:  1/08/19    Electroconvulsive therapy     VO from DR. Gloria KINNEY to create plan later today    Electroconvulsive therapy     Series of up to 6 treatments. Begin Date: 1/09/19   Treating Psychiatrist providing ECT:  Dr. García     Notified on:  1/08/19    Fall precautions    Routine Programming     As clinically indicated    Status 15     Every 15 minutes.     Plan: Continue with ECT #4 1/15/19  Rationale for change in precautions or plan: Patient is improving with ECT.

## 2019-01-14 NOTE — PLAN OF CARE
Pleasant and cooperative. Withdrawn.  Spent much of the shift reading in her room.  States that she is nervous for ECT # 3 tomorrow.  However, she does feel that it is helping.  Reports that her mood has improved.  Flat in affect but brightened during interaction. Denied any physical complaints.  Denied SI. Med compliant.

## 2019-01-14 NOTE — PLAN OF CARE
Patient denies suicidal ideation.  Patient had ECT # 3 today.  She is tired and lethargic today but did attend some groups.  Her memory is good.  States that her mood is good. She denies pain.

## 2019-01-14 NOTE — PROGRESS NOTES
Pt tolerated procedure well.  Transferred back to [address] per wheelchair accompanied by nursing assistant.

## 2019-01-14 NOTE — ANESTHESIA POSTPROCEDURE EVALUATION
Patient: Misty Parham    * No procedures listed *    Diagnosis:* No pre-op diagnosis entered *  Diagnosis Additional Information: No value filed.    Anesthesia Type:  General    Note:  Anesthesia Post Evaluation    Patient location during evaluation: PACU  Patient participation: Able to fully participate in evaluation  Level of consciousness: awake  Pain management: adequate  Airway patency: patent  Cardiovascular status: acceptable  Respiratory status: acceptable  Hydration status: acceptable  PONV: controlled     Anesthetic complications: None          Last vitals:  Vitals:    01/14/19 0655 01/14/19 0700 01/14/19 0705   BP: 98/59 108/77 106/59   Pulse: 75 87 72   Resp: 18 17 17   Temp:      SpO2: 97% 97% 97%         Electronically Signed By: Kelvin Meier MD  January 14, 2019  7:09 AM

## 2019-01-14 NOTE — PROGRESS NOTES
[facility] Psychiatric Progress Note       Interim History   The patient's care was discussed with the treatment team and chart notes were reviewed. Per attending hospital staff, the patient has been medication compliant, cooperative, pleasant, however continues to be isolative and quiet. It was noted that she has been trying to attend more group sessions and become more social. Pt seen on 1/14/19 by Dr. Madrigal. Upon interview, the patient states she is doing better today. She had ECT this morning, which went well with no complications. She believes this treatment has helped improve her mood. She will continue with her fourth session on 1/16/19 with Toradol and Zofran to avoid nausea and headaches. She continues to tolerate her current medication regiment well, specifically the Trintellix medication. From this, Dr. Madrigal will not make any changes to the patients medications.      Hospital Course   This is a 33 year old female with a history of major depression (recurrent and severe), anxiety, and borderline personality disorder. She has been hospitalized in the past in regards of her mental health, twice at  in 2018 and another at [facility] in 2015. Her most recent hospitalization being from 03/23/18 - 03/27/18. She follows Drew Juarez NP at [facility]. The patient's Trintellix dosage was increased to 20mg daily, Dr. Madrigal would like to leave all other medications the same at this time. Over the weekend (per attending hospital staff note), the patient has been pleasant, cooperative, medication compliant, however isolative. She made it a goal of hers to be more social with her peers and to avoid her room. She has attended group sessions and participates appropriately. The patient did state that she was still feeling depressed and anxious, however has been denying any thoughts of suicide. The patient has been tolerating her current medication regiment well,  however she has been experiencing some mild nausea after the increase in Trintellix made on 1/03. From this, Dr. Madrigal ordered Zofran 4mg to aid in this nausea. Dr. Madrigal also discussed ECT psychiatric treatment with patient. He informed her of the procedure itself and its possible effects such as short term memory loss (for a short period of time) and also reviews the benefits of this form of treatment. After a day of consideration, the patient agreed to undergo ECT treatment, thus her first session will be on 1/09/19. The patient tolerated her first ECT session well, only attaining a slight headache and mild nausea, Toradol and Zofran were ordered for her upcoming ECT sessions.      Medications     Current Facility-Administered Medications Ordered in Epic   Medication Dose Route Frequency Last Rate Last Dose     [Auto Hold] acetaminophen (TYLENOL) tablet 650 mg  650 mg Oral Q4H PRN   650 mg at 01/09/19 0936     [Auto Hold] ARIPiprazole (ABILIFY) tablet 10 mg  10 mg Oral Daily   10 mg at 01/13/19 0908     [Auto Hold] cetirizine (zyrTEC) tablet 10 mg  10 mg Oral Daily PRN         [Auto Hold] hydrOXYzine (ATARAX) tablet 25 mg  25 mg Oral Q4H PRN   25 mg at 01/09/19 0936     [Auto Hold] hydrOXYzine (ATARAX) tablet 25-50 mg  25-50 mg Oral BID PRN         ketorolac (TORADOL) injection 30 mg  30 mg Intravenous Q Mon Wed Fri AM   30 mg at 01/14/19 0628     ketorolac (TORADOL) injection 30 mg  30 mg Intravenous Q Mon Wed Fri AM   Stopped at 01/11/19 0812     lactated ringers infusion   Intravenous Continuous 10 mL/hr at 01/14/19 0626 500 mL at 01/14/19 0626     [Auto Hold] mirtazapine (REMERON) tablet 30 mg  30 mg Oral At Bedtime   30 mg at 01/13/19 2105     [Auto Hold] ondansetron (ZOFRAN) injection 4 mg  4 mg Intravenous Q Mon Wed Fri AM         [Auto Hold] ondansetron (ZOFRAN) tablet 4 mg  4 mg Oral Daily PRN   4 mg at 01/09/19 0936     [Auto Hold] traZODone (DESYREL) tablet 100 mg  100 mg Oral At Bedtime    "100 mg at 01/13/19 2105     [Auto Hold] vortioxetine (TRINTELLIX/BRINTELLIX) tablet 20 mg  20 mg Oral Daily   20 mg at 01/13/19 0908     No current [facility]-ordered outpatient medications on file.         Allergies      No Known Allergies     Medical Review of Systems     /77   Pulse 87   Temp 99.7  F (37.6  C) (Temporal)   Resp 17   Ht 1.651 m (5' 5\")   Wt 104 kg (229 lb 4.8 oz)   SpO2 97%   BMI 38.16 kg/m    Body mass index is 38.16 kg/m .  A 10-point review of systems was performed by Zhang Madrigal MD and is negative, no new findings.      Psychiatric Examination     Appearance Sitting in chair, dressed in hospital scrubs. Appears stated age.   Attitude Cooperative   Orientation Oriented to person, place, time   Eye Contact Good   Speech Regular rate, rhythm, volume and tone   Language Normal   Psychomotor Behavior Normal   Mood Less depressed and anxious   Affect Flat and quiet   Thought Process Goal-Oriented, Intact   Associations Intact   Thought Content Patient is currently negative for suicidal ideation, negative for plan or intent, able to contract no self harm and identify barriers to suicide.  Negative for obsessions, compulsions or psychosis.     Fund of Knowledge Intact   Insight Intact   Judgement Intact   Attention Span & Concentration Intact   Recent & Remote Memory Intact   Gait Normal   Muscle Tone Intact        Labs     Labs reviewed.  No results found for this or any previous visit (from the past 24 hour(s)).     Impression   This is a 33 year old female with a history of major depression (recurrent and severe), anxiety, and borderline personality disorder. She has been hospitalized in the past in regards of her mental health, twice at  in 2018 and another at [facility] in 2015. Her most recent hospitalization being from 03/23/18 - 03/27/18. She follows Drew Juarez NP at [facility]. Patient continues to be pleasant, cooperative, and medication " compliant. She continues to be somewhat be isolative and withdrawn, however has made an effort to attend group sessions on SDU. Patient has tolerated her current medication regiment well, not attaining any side effects or concerns. Thus there will be no medication changes made today. The patient has expressed that she believes ECT treatment has helped improved her mood. She will continue with her fourth session on 1/16/19 where Toradol and Zofran will be given to avoid headaches and nausea.       Diagnoses   1. Major depression, recurrent, severe, without psychotic features.  2. Generalized Anxiety Disorder  3. Traits of Borderline Personality Disorder     Plan     1. Explained side effects, benefits, and complications of medications to the patient, Pt gave verbal consent.  2. Medication changes: no medication changes   3. Discussed treatment plan with patient and team.  4. Projected length of stay: 1 week  5. ECT session on 1/16/19, this will be her fourth session       Attestation:   Patient has been seen and evaluated by me, Zhang Madrigal MD.    Patient ID:  Name: Misty Parham    MRN: [id number]  Admission: 1/3/2019   YOB: 1985

## 2019-01-14 NOTE — ANESTHESIA CARE TRANSFER NOTE
Patient: Misty Parham    * No procedures listed *    Diagnosis: * No pre-op diagnosis entered *  Diagnosis Additional Information: No value filed.    Anesthesia Type:   General     Note:  Airway :Face Mask  Patient transferred to:PACU  Comments: To pacu. Vss. Report given to RN assuming care of ptHandoff Report: Identifed the Patient, Identified the Reponsible Provider, Reviewed the pertinent medical history, Discussed the surgical course, Reviewed Intra-OP anesthesia mangement and issues during anesthesia, Set expectations for post-procedure period and Allowed opportunity for questions and acknowledgement of understanding      Vitals: (Last set prior to Anesthesia Care Transfer)    CRNA VITALS  1/14/2019 0611 - 1/14/2019 0641      1/14/2019             Pulse:  78    SpO2:  98 %    Resp Rate (set):  10                Electronically Signed By: WILLIAN Gandara CRNA  January 14, 2019  6:41 AM

## 2019-01-15 PROCEDURE — 25000132 ZZH RX MED GY IP 250 OP 250 PS 637: Performed by: PSYCHIATRY & NEUROLOGY

## 2019-01-15 PROCEDURE — 90853 GROUP PSYCHOTHERAPY: CPT

## 2019-01-15 PROCEDURE — 12400000 ZZH R&B MH

## 2019-01-15 RX ADMIN — ARIPIPRAZOLE 10 MG: 10 TABLET ORAL at 09:00

## 2019-01-15 RX ADMIN — MIRTAZAPINE 30 MG: 30 TABLET, FILM COATED ORAL at 20:56

## 2019-01-15 RX ADMIN — TRAZODONE HYDROCHLORIDE 100 MG: 100 TABLET ORAL at 20:56

## 2019-01-15 RX ADMIN — VORTIOXETINE 20 MG: 20 TABLET, FILM COATED ORAL at 09:00

## 2019-01-15 RX ADMIN — ACETAMINOPHEN 650 MG: 325 TABLET, FILM COATED ORAL at 17:05

## 2019-01-15 ASSESSMENT — ACTIVITIES OF DAILY LIVING (ADL)
HYGIENE/GROOMING: INDEPENDENT
ORAL_HYGIENE: INDEPENDENT
HYGIENE/GROOMING: INDEPENDENT
DRESS: INDEPENDENT
LAUNDRY: WITH SUPERVISION
DRESS: INDEPENDENT
LAUNDRY: WITH SUPERVISION
ORAL_HYGIENE: INDEPENDENT

## 2019-01-15 ASSESSMENT — MIFFLIN-ST. JEOR: SCORE: 1760.02

## 2019-01-15 NOTE — PROGRESS NOTES
[facility] Psychiatric Progress Note       Interim History   The patient's care was discussed with the treatment team and chart notes were reviewed. Per attending hospital staff, the patient has been medication compliant, cooperative, pleasant, however continues to be quiet throughout the Step Down Unit. She has started to attend more group sessions, and has participated appropriately. Pt seen on 1/15/19 by Dr. Madrigal. Upon interview, the patient states she is feeling much better. She is able to engage more in the conversation and is able to smile. She agrees that she had made progress since being admitted. She has not experienced any complications in regards of ECT, not experiencing headaches or nausea. She has experienced minimal memory loss. She will continue with her fourth session on 1/16/19. She continues to tolerate her current medication regiment well, thus Dr. Madrigal will not make any changes to the patients medications.      Hospital Course   This is a 33 year old female with a history of major depression (recurrent and severe), anxiety, and borderline personality disorder. She has been hospitalized in the past in regards of her mental health, twice at  in 2018 and another at [facility] in 2015. Her most recent hospitalization being from 03/23/18 - 03/27/18. She follows Drew Juarez NP at [facility]. The patient's Trintellix dosage was increased to 20mg daily, Dr. Madrigal would like to leave all other medications the same at this time. Over the weekend (per attending hospital staff note), the patient has been pleasant, cooperative, medication compliant, however isolative. She made it a goal of hers to be more social with her peers and to avoid her room. She has attended group sessions and participates appropriately. The patient did state that she was still feeling depressed and anxious, however has been denying any thoughts of suicide. The patient has  been tolerating her current medication regiment well, however she has been experiencing some mild nausea after the increase in Trintellix made on 1/03. From this, Dr. Madrigal ordered Zofran 4mg to aid in this nausea. Dr. Madrigal also discussed ECT psychiatric treatment with patient. He informed her of the procedure itself and its possible effects such as short term memory loss (for a short period of time) and also reviews the benefits of this form of treatment. After a day of consideration, the patient agreed to undergo ECT treatment, thus her first session will be on 1/09/19. The patient tolerated her first ECT session well, only attaining a slight headache and mild nausea, Toradol and Zofran were ordered for her upcoming ECT sessions.      Medications     Current Facility-Administered Medications Ordered in Epic   Medication Dose Route Frequency Last Rate Last Dose     acetaminophen (TYLENOL) tablet 650 mg  650 mg Oral Q4H PRN   650 mg at 01/09/19 0936     ARIPiprazole (ABILIFY) tablet 10 mg  10 mg Oral Daily   10 mg at 01/15/19 0900     cetirizine (zyrTEC) tablet 10 mg  10 mg Oral Daily PRN         hydrOXYzine (ATARAX) tablet 25 mg  25 mg Oral Q4H PRN   25 mg at 01/09/19 0936     hydrOXYzine (ATARAX) tablet 25-50 mg  25-50 mg Oral BID PRN         ketorolac (TORADOL) injection 30 mg  30 mg Intravenous Q Mon Wed Fri AM   30 mg at 01/14/19 0628     lactated ringers infusion   Intravenous Continuous 10 mL/hr at 01/14/19 0626 500 mL at 01/14/19 0626     mirtazapine (REMERON) tablet 30 mg  30 mg Oral At Bedtime   30 mg at 01/14/19 2059     ondansetron (ZOFRAN) injection 4 mg  4 mg Intravenous Q Mon Wed Fri AM         ondansetron (ZOFRAN) tablet 4 mg  4 mg Oral Daily PRN   4 mg at 01/09/19 0936     traZODone (DESYREL) tablet 100 mg  100 mg Oral At Bedtime   100 mg at 01/14/19 2059     vortioxetine (TRINTELLIX/BRINTELLIX) tablet 20 mg  20 mg Oral Daily   20 mg at 01/15/19 0900     No current [facility]-ordered outpatient  "medications on file.         Allergies      No Known Allergies     Medical Review of Systems     /70   Pulse 87   Temp 97.8  F (36.6  C) (Oral)   Resp 16   Ht 1.651 m (5' 5\")   Wt 106.4 kg (234 lb 9.6 oz)   SpO2 98%   BMI 39.04 kg/m    Body mass index is 39.04 kg/m .  A 10-point review of systems was performed by Zhang Madrigal MD and is negative, no new findings.      Psychiatric Examination     Appearance Sitting in chair, dressed in causal clothing. Appears stated age.   Attitude Cooperative   Orientation Oriented to person, place, time   Eye Contact Much improved   Speech Regular rate, rhythm, volume and tone   Language Normal   Psychomotor Behavior No psychomotor agitation or retardation.    Mood Significantly less depressed and anxious   Affect Much less flat, much brighter, more expressive   Thought Process Goal-Oriented, Intact   Associations Intact   Thought Content Patient is currently negative for suicidal ideation, negative for plan or intent, able to contract no self harm and identify barriers to suicide.  Negative for obsessions, compulsions or psychosis.     Fund of Knowledge Intact   Insight Intact   Judgement Intact   Attention Span & Concentration Much more attentive   Recent & Remote Memory Mild short term memory impairment   Gait Normal   Muscle Tone Intact        Labs     Labs reviewed.  No results found for this or any previous visit (from the past 24 hour(s)).     Impression   This is a 33 year old female with a history of major depression (recurrent and severe), anxiety, and borderline personality disorder. She underwent her first ECT session on 1/09/19 and will continue with her fourth session on 1/16/19. She is tolerating ECT extremely well and appears to have only mild short term memory complications.  She has not experienced any headaches or nausea the past two sessions since starting IV Zofran and IV Toradol just prior to ECT.  She has started to attend more group " sessions, and participates appropriately. Patient has tolerated her current medication regiment well, not attaining any side effects or concerns. There will be no medication changes made. The patient is able to acknowlege that she has made progress since being admitted to [facility]. There have been no complications in regards of this form of treatment. The patient requires inpatient ECT.       Diagnoses   1. Major depression, recurrent, severe, without psychotic features.  2. Generalized Anxiety Disorder  3. Traits of Borderline Personality Disorder     Plan     1. Explained side effects, benefits, and complications of medications to the patient, Pt gave verbal consent.  2. Medication changes: no medication changes   3. Discussed treatment plan with patient and team.  4. Projected length of stay: 1 week  5. ECT session on 1/16/19, this will be her fourth session       Attestation:   Patient has been seen and evaluated by me, Zhang Madrigal MD.    Patient ID:  Name: Misty Parham    MRN: [id number]  Admission: 1/3/2019   YOB: 1985

## 2019-01-15 NOTE — PLAN OF CARE
Depressive Symptoms  Depressive Symptoms  Description  Signs and symptoms of listed problems will be absent or manageable.  1/14/2019 2117 - Improving by Robert Shaver  Flowsheets  Taken 1/7/2019 1424   Depressive Symptoms Assessed  all  Taken 1/14/2019 2117   Depressive Symptoms Present  affect;memory deficits  Note  Pt was visible and pleasant within the SDU. Pt presents with a blunt affect and calm mood. Pt spent almost all shift out of her room, participating in the milieu. Pt is more active, but still minimally social and withdrawn. Pt will respond upon prompt but still short answer. Pt stated that her memory is better from this morning and is not reporting headache or nausea. Pt declined groups for the evening. Pt ate all of her food and was med compliant.

## 2019-01-15 NOTE — PLAN OF CARE
Pt presents with calm mood and flat affect.  Pt has been visible and withdrawn on the unit.  Pt attended groups participating minimally.  During 1:1 pt expressed she believes ECT is helping her mood.  Pt is hopeful it will continue to help. Pt believes her concentrating is improving. Pt  has been spending time in the milieu. Pt denies SI and is medication compliant.

## 2019-01-16 ENCOUNTER — ANESTHESIA (OUTPATIENT)
Dept: SURGERY | Facility: CLINIC | Age: 34
End: 2019-01-16

## 2019-01-16 ENCOUNTER — ANESTHESIA EVENT (OUTPATIENT)
Dept: SURGERY | Facility: CLINIC | Age: 34
End: 2019-01-16

## 2019-01-16 PROCEDURE — 25000132 ZZH RX MED GY IP 250 OP 250 PS 637: Performed by: PSYCHIATRY & NEUROLOGY

## 2019-01-16 PROCEDURE — 90853 GROUP PSYCHOTHERAPY: CPT

## 2019-01-16 PROCEDURE — 90870 ELECTROCONVULSIVE THERAPY: CPT

## 2019-01-16 PROCEDURE — 97150 GROUP THERAPEUTIC PROCEDURES: CPT | Mod: GO

## 2019-01-16 PROCEDURE — 25000125 ZZHC RX 250: Performed by: NURSE ANESTHETIST, CERTIFIED REGISTERED

## 2019-01-16 PROCEDURE — 25000128 H RX IP 250 OP 636: Performed by: NURSE ANESTHETIST, CERTIFIED REGISTERED

## 2019-01-16 PROCEDURE — 12400000 ZZH R&B MH

## 2019-01-16 PROCEDURE — 25000128 H RX IP 250 OP 636: Performed by: PSYCHIATRY & NEUROLOGY

## 2019-01-16 RX ADMIN — KETOROLAC TROMETHAMINE 30 MG: 30 INJECTION, SOLUTION INTRAMUSCULAR at 06:25

## 2019-01-16 RX ADMIN — ARIPIPRAZOLE 10 MG: 10 TABLET ORAL at 09:02

## 2019-01-16 RX ADMIN — SODIUM CHLORIDE, POTASSIUM CHLORIDE, SODIUM LACTATE AND CALCIUM CHLORIDE: 600; 310; 30; 20 INJECTION, SOLUTION INTRAVENOUS at 06:25

## 2019-01-16 RX ADMIN — MIRTAZAPINE 30 MG: 30 TABLET, FILM COATED ORAL at 21:13

## 2019-01-16 RX ADMIN — VORTIOXETINE 20 MG: 20 TABLET, FILM COATED ORAL at 09:02

## 2019-01-16 RX ADMIN — SUCCINYLCHOLINE CHLORIDE 100 MG: 20 INJECTION, SOLUTION INTRAMUSCULAR; INTRAVENOUS; PARENTERAL at 06:35

## 2019-01-16 RX ADMIN — METHOHEXITAL SODIUM 100 MG: 500 INJECTION, POWDER, LYOPHILIZED, FOR SOLUTION INTRAMUSCULAR; INTRAVENOUS; RECTAL at 06:35

## 2019-01-16 RX ADMIN — TRAZODONE HYDROCHLORIDE 100 MG: 100 TABLET ORAL at 21:13

## 2019-01-16 ASSESSMENT — ACTIVITIES OF DAILY LIVING (ADL)
ORAL_HYGIENE: INDEPENDENT
LAUNDRY: WITH SUPERVISION
DRESS: INDEPENDENT
HYGIENE/GROOMING: INDEPENDENT

## 2019-01-16 NOTE — ANESTHESIA PREPROCEDURE EVALUATION
Anesthesia Pre-Procedure Evaluation    Patient: Misty Parham   MRN: [id number] : 1985          Preoperative Diagnosis: * No pre-op diagnosis entered *    * No procedures listed *    Past Medical History:   Diagnosis Date     Depressive disorder      No past surgical history on file.    Anesthesia Evaluation     . Pt has had prior anesthetic.     No history of anesthetic complications          ROS/MED HX    ENT/Pulmonary:      (-) sleep apnea   Neurologic:       Cardiovascular:         METS/Exercise Tolerance:  >4 METS   Hematologic:         Musculoskeletal:         GI/Hepatic:        (-) GERD   Renal/Genitourinary:  - ROS Renal section negative       Endo:  - neg endo ROS   (+) Obesity (BMI 39), .      Psychiatric:     (+) psychiatric history (severe) depression and anxiety      Infectious Disease:         Malignancy:         Other:                          Physical Exam  Normal systems: dental    Airway   Mallampati: III  TM distance: >3 FB  Neck ROM: full    Dental     Cardiovascular   Rhythm and rate: regular and normal      Pulmonary    breath sounds clear to auscultation            Lab Results   Component Value Date    WBC 7.5 2019    HGB 12.1 2019    HCT 37.3 2019     2019     2019    POTASSIUM 3.7 2019    CHLORIDE 108 2019    CO2 25 2019    BUN 10 2019    CR 0.84 2019    GLC 93 2019    CRISTINE 8.7 2019    ALBUMIN 3.7 2019    PROTTOTAL 7.8 2019    ALT 21 2019    AST 20 2019    ALKPHOS 76 2019    BILITOTAL 0.4 2019    TSH 0.80 2019    HCG Negative 2019       Preop Vitals  BP Readings from Last 3 Encounters:   19 116/72   18 107/70   18 119/74    Pulse Readings from Last 3 Encounters:   01/15/19 75   18 92      Resp Readings from Last 3 Encounters:   19 16   18 16   18 18    SpO2 Readings from Last 3 Encounters:   19 99%  "  03/27/18 99%   03/22/18 97%      Temp Readings from Last 1 Encounters:   01/16/19 36.6  C (97.8  F) (Oral)    Ht Readings from Last 1 Encounters:   01/03/19 1.651 m (5' 5\")      Wt Readings from Last 1 Encounters:   01/15/19 106.4 kg (234 lb 9.6 oz)    Estimated body mass index is 39.04 kg/m  as calculated from the following:    Height as of this encounter: 1.651 m (5' 5\").    Weight as of this encounter: 106.4 kg (234 lb 9.6 oz).       Anesthesia Plan      History & Physical Review  History and physical reviewed and following examination; no interval change.    ASA Status:  2 .    NPO Status:  > 8 hours    Plan for General with Other induction.     Last treatment 100 mg Brevital/ 100 mg Succinylcholine      Postoperative Care      Consents  Anesthetic plan, risks, benefits and alternatives discussed with:  Patient..                 Kendall Hawk MD  "

## 2019-01-16 NOTE — ANESTHESIA POSTPROCEDURE EVALUATION
Patient: Misty Parham    * No procedures listed *    Diagnosis:* No pre-op diagnosis entered *  Diagnosis Additional Information: No value filed.    Anesthesia Type:  General    Note:  Anesthesia Post Evaluation    Patient location during evaluation: PACU  Patient participation: Able to fully participate in evaluation  Level of consciousness: awake  Pain management: adequate  Airway patency: patent  Cardiovascular status: acceptable  Respiratory status: acceptable  Hydration status: acceptable  PONV: none     Anesthetic complications: None          Last vitals:  Vitals:    01/16/19 0710 01/16/19 0715 01/16/19 0720   BP: 111/65 107/84 104/77   Pulse:  72 70   Resp: 17 20 18   Temp:      SpO2: 97% 97% 98%         Electronically Signed By: Kendall Hawk MD  January 16, 2019  7:25 AM

## 2019-01-16 NOTE — PLAN OF CARE
Depressive Symptoms  Depressive Symptoms  Description  Signs and symptoms of listed problems will be absent or manageable.  1/15/2019 2214 - Improving by Robert Shaver  Flowsheets  Taken 1/7/2019 1424   Depressive Symptoms Assessed  all  Taken 1/15/2019 2214   Depressive Symptoms Present  memory deficits  Note  Pt was active and pleasant within the SDU. Pt presents with a blunt affect and calm mood. Pt spent most of the shift in the lounge with peers. Pt's affect does seem to be slightly brighter and she seems to be more social with peers. Pt is aware that her mood has improved. Pt joined a group of patients in playing QQTechnologyble and pictionary. Pt attended all unit programming and participated appropriately. Pt ate all of her food and was med compliant.

## 2019-01-16 NOTE — PLAN OF CARE
With initial task set up and direct verbal cues, pt participated in therapeutic group activity and discussion addressing balanced scheduling. Educated pt on four categories of activity (self-care, leisure, productivity, and social) and the importance of balance between categories for wellness with pt verbalizing understanding. Pt created pie chart of current breakdown of daily schedule using the four categories and identified where she is out of balance. Pt reports limited leisure and social activities in her daily schedule and with MOD A, problem-solved strategies to address same including identifying activities she would like to incorporate into daily schedule (e.g. Read, color, call friends/family members). Additionally, with MIN A, pt ID'd how to incorporate exercise into schedule after discharge (walk at the mall), something that makes her feel good about herself (her children), and triggers to anger (others being rude to her). Pt will continue to benefit from OT intervention to address implementation of positive functional coping skills, role performance, and community reintegration.

## 2019-01-16 NOTE — PROCEDURES
[facility] ECT Procedure Note     Misty Parham [id number]   33 year old 1985     Patient Status: Inpatient    No Known Allergies    Weight:  234 lbs 9.6 oz    Patient Preparations: Other (comment)(not present)         Diagnosis:   Major depression       Indications for ECT:   Medications ineffective       Pause for the Cause:     Right patient Yes   Right procedure/laterality settings: Yes   Right diagnosis Yes          Intra-Procedure Documentation:     Date:  1/16/2019  Time:  6:57 AM    ECT #    Treatment number this series: 4   Total treatment number: 4   Type of ECT:  Bilateral, standard    ECT Medications administered: Brevital: 100mg  Succinyl Choline: 100mg   Toradol 30 mg         Clinical Narrative:     ECT was administered by Thymatron machine.  Pt has been medically cleared for procedure, consent signed. Side effects, Risks and benefits reviewed.    ECT Strip Summary:   Energy Level: 80 percent  Motor Seizure Duration: 31 seconds  EEG Seizure Duration: 31 seconds    Complications: No    Plan: 5th ECT 1/18/19  Outpatient Psychiatrist: Drew Juarez NP

## 2019-01-16 NOTE — PLAN OF CARE
Pt presents with a blunt and flat affect, but appears to brighten up upon approach. Attended groups this shift. During staff check-in pt stated she did not feel depressed or anxious. Pt only stated couple word answers to staff. Denies any SI this shift.

## 2019-01-17 PROCEDURE — 12400000 ZZH R&B MH

## 2019-01-17 PROCEDURE — 25000132 ZZH RX MED GY IP 250 OP 250 PS 637: Performed by: PSYCHIATRY & NEUROLOGY

## 2019-01-17 PROCEDURE — 90853 GROUP PSYCHOTHERAPY: CPT

## 2019-01-17 RX ADMIN — MIRTAZAPINE 30 MG: 30 TABLET, FILM COATED ORAL at 20:56

## 2019-01-17 RX ADMIN — VORTIOXETINE 20 MG: 20 TABLET, FILM COATED ORAL at 08:27

## 2019-01-17 RX ADMIN — TRAZODONE HYDROCHLORIDE 100 MG: 100 TABLET ORAL at 20:56

## 2019-01-17 RX ADMIN — ARIPIPRAZOLE 10 MG: 10 TABLET ORAL at 08:27

## 2019-01-17 ASSESSMENT — ACTIVITIES OF DAILY LIVING (ADL)
HYGIENE/GROOMING: INDEPENDENT
DRESS: INDEPENDENT
ORAL_HYGIENE: INDEPENDENT

## 2019-01-17 NOTE — PLAN OF CARE
Pt reports she feels that the ECT is working for her and she feels better. She has not noted any side effects from it. She visited with her  and two kids this shift. Pt attended wrap up group with proper participation. She presents with a blunt affect and is calm in mood.

## 2019-01-17 NOTE — PLAN OF CARE
Goal: Team Discussion  Outcome: Improving   BEHAVIORAL TEAM DISCUSSION     Participants: Dr. Madrigal, social workers, nurses, psych asst  Progress: Pt is improving, pt has had 4 ECTs, pt is more visible on the unit.   Continued Stay Criteria/Rationale: Pt will remain on the unit for the series of ECTs.   Medical/Physical:N/A  Precautions:        Behavioral Orders   Procedures    Code 1 - Restrict to Unit    Routine Programming       As clinically indicated    Seizure precautions    Status 15       Every 15 minutes.      Plan: Pt will remain on the unit for her ECT treatments.   Rationale for change in precautions or plan: Pt is improving, needs further treatment.

## 2019-01-17 NOTE — PLAN OF CARE
Pt is A&O x4, more visible on the unit and participating in programing. Mood is improving, with flat affect but improving. Speech is clear. Behavior is appropriate. Expressed hope about ECT being helpful, says her depression is improving. Pt  denies any suicidal ideation. Plan is for ECT # 5 on Friday. Showered this morning and is med compliant.

## 2019-01-17 NOTE — PLAN OF CARE
Patient attended Process Group today and participated appropriately. Patient demonstrated emerging insight into the topic of discussion. She had some difficulty with coming up with coping skills that she could use during an exercise in group. She was able to answer eventually with some prompting.

## 2019-01-17 NOTE — PROGRESS NOTES
[facility]iatric Progress Note       Interim History   The patient's care was discussed with the treatment team and chart notes were reviewed. Per attending hospital staff, the patient has been more visible throughout SDU and has been attending more group sessions. Even though she attains a flat affect, her mood has improved since first being admitted. She expressed that she believes that ECT treatment has helped improve her mood and depression. Pt seen on 1/17/19 by Dr. Madrigal. Upon interview, the patient states she is well today. She appears to be brighter and less anxious. She is looking forward to discharge, however continues to be willing to stay in the hospital for her last ECT session that will occur tomorrow, 1/18.      Hospital Course   This is a 33 year old female with a history of major depression (recurrent and severe), anxiety, and borderline personality disorder. She has been hospitalized in the past in regards of her mental health, twice at  in 2018 and another at [facility] in 2015. Her most recent hospitalization being from 03/23/18 - 03/27/18. She follows Drew Juarez NP at [facility]. The patient's Trintellix dosage was increased to 20mg daily, Dr. Madrigal would like to leave all other medications the same at this time. Over the weekend (per attending hospital staff note), the patient has been pleasant, cooperative, medication compliant, however isolative. She made it a goal of hers to be more social with her peers and to avoid her room. She has attended group sessions and participates appropriately. The patient did state that she was still feeling depressed and anxious, however has been denying any thoughts of suicide. The patient has been tolerating her current medication regiment well, however she has been experiencing some mild nausea after the increase in Trintellix made on 1/03. From this, Dr. Madrigal ordered Zofran 4mg to aid in  "this nausea. Dr. Madrigal also discussed ECT psychiatric treatment with patient. He informed her of the procedure itself and its possible effects such as short term memory loss (for a short period of time) and also reviews the benefits of this form of treatment. After a day of consideration, the patient agreed to undergo ECT treatment, thus her first session will be on 1/09/19. The patient tolerated her first ECT session well, only attaining a slight headache and mild nausea, Toradol and Zofran were ordered for her upcoming ECT sessions.      Medications     Current Facility-Administered Medications Ordered in Epic   Medication Dose Route Frequency Last Rate Last Dose     acetaminophen (TYLENOL) tablet 650 mg  650 mg Oral Q4H PRN   650 mg at 01/15/19 1705     ARIPiprazole (ABILIFY) tablet 10 mg  10 mg Oral Daily   10 mg at 01/17/19 0827     cetirizine (zyrTEC) tablet 10 mg  10 mg Oral Daily PRN         hydrOXYzine (ATARAX) tablet 25 mg  25 mg Oral Q4H PRN   25 mg at 01/09/19 0936     hydrOXYzine (ATARAX) tablet 25-50 mg  25-50 mg Oral BID PRN         lactated ringers infusion   Intravenous Continuous 10 mL/hr at 01/16/19 0625       mirtazapine (REMERON) tablet 30 mg  30 mg Oral At Bedtime   30 mg at 01/16/19 2113     ondansetron (ZOFRAN) injection 4 mg  4 mg Intravenous Q Mon Wed Fri AM         ondansetron (ZOFRAN) tablet 4 mg  4 mg Oral Daily PRN   4 mg at 01/09/19 0936     traZODone (DESYREL) tablet 100 mg  100 mg Oral At Bedtime   100 mg at 01/16/19 2113     vortioxetine (TRINTELLIX/BRINTELLIX) tablet 20 mg  20 mg Oral Daily   20 mg at 01/17/19 0827     No current [facility]-ordered outpatient medications on file.         Allergies      No Known Allergies     Medical Review of Systems     BP 99/64   Pulse 64   Temp 97.4  F (36.3  C) (Oral)   Resp 18   Ht 1.651 m (5' 5\")   Wt 106.4 kg (234 lb 9.6 oz)   SpO2 98%   BMI 39.04 kg/m    Body mass index is 39.04 kg/m .  A 10-point review of systems was performed by " Zhang Madrigal MD and is negative, no new findings.      Psychiatric Examination     Appearance Sitting in chair, dressed in causal clothing. Appears stated age.   Attitude Cooperative   Orientation Oriented to person, place, time   Eye Contact Much improved   Speech Regular rate, rhythm, volume and tone   Language Normal   Psychomotor Behavior No psychomotor agitation or retardation.    Mood Significantly less depressed and anxious   Affect Much less flat, much brighter, more expressive   Thought Process Goal-Oriented, Intact   Associations Intact   Thought Content Patient is currently negative for suicidal ideation, negative for plan or intent, able to contract no self harm and identify barriers to suicide.  Negative for obsessions, compulsions or psychosis.     Fund of Knowledge Intact   Insight Intact   Judgement Intact   Attention Span & Concentration Much more attentive   Recent & Remote Memory Mild short term memory impairment   Gait Normal   Muscle Tone Intact        Labs     Labs reviewed.  No results found for this or any previous visit (from the past 24 hour(s)).     Impression   This is a 33 year old female with a history of major depression (recurrent and severe), anxiety, and borderline personality disorder. She underwent her first ECT session on 1/09/19 and will continue with her fifth session on 1/18/19. Again, she has tolerated ECT treatment extremely well and has improved in mood. She has expressed this improvement to both staff members and Dr. Madrigal. Per our [facility] staff members, the patient continues to attend group sessions and participate appropriately. Due to the patient tolerating her current medication regiment well (not attaining any side effects or concerns) there will be no medication changes made by Dr. Madrigal today. The patient requires inpatient ECT.       Diagnoses   1. Major depression, recurrent, severe, without psychotic features.  2. Generalized Anxiety  Disorder  3. Traits of Borderline Personality Disorder     Plan     1. Explained side effects, benefits, and complications of medications to the patient, Pt gave verbal consent.  2. Medication changes: no medication changes   3. Discussed treatment plan with patient and team.  4. Projected length of stay: 1 week  5. ECT session on 1/18/19, this will be her fifth session       Attestation:   Patient has been seen and evaluated by me, Zhang Madrigal MD.    Patient ID:  Name: Misty Parham    MRN: [id number]  Admission: 1/3/2019   YOB: 1985

## 2019-01-18 ENCOUNTER — ANESTHESIA (OUTPATIENT)
Dept: SURGERY | Facility: CLINIC | Age: 34
End: 2019-01-18

## 2019-01-18 ENCOUNTER — ANESTHESIA EVENT (OUTPATIENT)
Dept: SURGERY | Facility: CLINIC | Age: 34
End: 2019-01-18

## 2019-01-18 PROCEDURE — 97150 GROUP THERAPEUTIC PROCEDURES: CPT | Mod: GO

## 2019-01-18 PROCEDURE — 90853 GROUP PSYCHOTHERAPY: CPT

## 2019-01-18 PROCEDURE — 25000132 ZZH RX MED GY IP 250 OP 250 PS 637: Performed by: PSYCHIATRY & NEUROLOGY

## 2019-01-18 PROCEDURE — 12400000 ZZH R&B MH

## 2019-01-18 PROCEDURE — 25000128 H RX IP 250 OP 636: Performed by: PSYCHIATRY & NEUROLOGY

## 2019-01-18 PROCEDURE — 25000125 ZZHC RX 250: Performed by: NURSE ANESTHETIST, CERTIFIED REGISTERED

## 2019-01-18 PROCEDURE — 90870 ELECTROCONVULSIVE THERAPY: CPT

## 2019-01-18 PROCEDURE — 25000128 H RX IP 250 OP 636: Performed by: NURSE ANESTHETIST, CERTIFIED REGISTERED

## 2019-01-18 RX ORDER — KETOROLAC TROMETHAMINE 30 MG/ML
30 INJECTION, SOLUTION INTRAMUSCULAR; INTRAVENOUS ONCE
Status: COMPLETED | OUTPATIENT
Start: 2019-01-18 | End: 2019-01-18

## 2019-01-18 RX ORDER — SODIUM CHLORIDE, SODIUM LACTATE, POTASSIUM CHLORIDE, CALCIUM CHLORIDE 600; 310; 30; 20 MG/100ML; MG/100ML; MG/100ML; MG/100ML
INJECTION, SOLUTION INTRAVENOUS CONTINUOUS
Status: DISCONTINUED | OUTPATIENT
Start: 2019-01-18 | End: 2019-01-21 | Stop reason: HOSPADM

## 2019-01-18 RX ADMIN — MIRTAZAPINE 30 MG: 30 TABLET, FILM COATED ORAL at 21:08

## 2019-01-18 RX ADMIN — SODIUM CHLORIDE, POTASSIUM CHLORIDE, SODIUM LACTATE AND CALCIUM CHLORIDE: 600; 310; 30; 20 INJECTION, SOLUTION INTRAVENOUS at 06:22

## 2019-01-18 RX ADMIN — METHOHEXITAL SODIUM 100 MG: 500 INJECTION, POWDER, LYOPHILIZED, FOR SOLUTION INTRAMUSCULAR; INTRAVENOUS; RECTAL at 06:36

## 2019-01-18 RX ADMIN — KETOROLAC TROMETHAMINE 30 MG: 30 INJECTION, SOLUTION INTRAMUSCULAR at 06:22

## 2019-01-18 RX ADMIN — ARIPIPRAZOLE 10 MG: 10 TABLET ORAL at 08:17

## 2019-01-18 RX ADMIN — VORTIOXETINE 20 MG: 20 TABLET, FILM COATED ORAL at 08:17

## 2019-01-18 RX ADMIN — SUCCINYLCHOLINE CHLORIDE 100 MG: 20 INJECTION, SOLUTION INTRAMUSCULAR; INTRAVENOUS; PARENTERAL at 06:36

## 2019-01-18 RX ADMIN — TRAZODONE HYDROCHLORIDE 100 MG: 100 TABLET ORAL at 21:08

## 2019-01-18 ASSESSMENT — ACTIVITIES OF DAILY LIVING (ADL)
HYGIENE/GROOMING: INDEPENDENT
DRESS: INDEPENDENT
ORAL_HYGIENE: INDEPENDENT
ORAL_HYGIENE: INDEPENDENT
DRESS: INDEPENDENT
HYGIENE/GROOMING: INDEPENDENT

## 2019-01-18 NOTE — ANESTHESIA PREPROCEDURE EVALUATION
Anesthesia Pre-Procedure Evaluation    Patient: Misty Parham   MRN: [id number] : 1985          Preoperative Diagnosis: * No pre-op diagnosis entered *    * No procedures listed *    Past Medical History:   Diagnosis Date     Depressive disorder      No past surgical history on file.    Anesthesia Evaluation     . Pt has had prior anesthetic.     No history of anesthetic complications          ROS/MED HX    ENT/Pulmonary:      (-) sleep apnea   Neurologic:       Cardiovascular:         METS/Exercise Tolerance:  >4 METS   Hematologic:         Musculoskeletal:         GI/Hepatic:        (-) GERD   Renal/Genitourinary:  - ROS Renal section negative       Endo: Comment: BMI 39    (+) Obesity (BMI 39), .      Psychiatric:     (+) psychiatric history (severe) depression and anxiety      Infectious Disease:         Malignancy:         Other:                          Physical Exam  Normal systems: dental    Airway   Mallampati: III  TM distance: >3 FB  Neck ROM: full  Comment: Poor effort at mouth opening    Dental     Cardiovascular   Rhythm and rate: regular      Pulmonary    breath sounds clear to auscultation            Lab Results   Component Value Date    WBC 7.5 2019    HGB 12.1 2019    HCT 37.3 2019     2019     2019    POTASSIUM 3.7 2019    CHLORIDE 108 2019    CO2 25 2019    BUN 10 2019    CR 0.84 2019    GLC 93 2019    CRISTINE 8.7 2019    ALBUMIN 3.7 2019    PROTTOTAL 7.8 2019    ALT 21 2019    AST 20 2019    ALKPHOS 76 2019    BILITOTAL 0.4 2019    TSH 0.80 2019    HCG Negative 2019       Preop Vitals  BP Readings from Last 3 Encounters:   19 105/67   18 107/70   18 119/74    Pulse Readings from Last 3 Encounters:   19 64   18 92      Resp Readings from Last 3 Encounters:   19 16   18 16   18 18    SpO2 Readings from Last 3  "Encounters:   01/18/19 99%   03/27/18 99%   03/22/18 97%      Temp Readings from Last 1 Encounters:   01/18/19 36.3  C (97.4  F) (Temporal)    Ht Readings from Last 1 Encounters:   01/03/19 1.651 m (5' 5\")      Wt Readings from Last 1 Encounters:   01/15/19 106.4 kg (234 lb 9.6 oz)    Estimated body mass index is 39.04 kg/m  as calculated from the following:    Height as of this encounter: 1.651 m (5' 5\").    Weight as of this encounter: 106.4 kg (234 lb 9.6 oz).       Anesthesia Plan      History & Physical Review      ASA Status:  2 .    NPO Status:  > 8 hours    Plan for General (Mask ventilation) with Intravenous induction.   PONV prophylaxis:  Ondansetron (or other 5HT-3)       Postoperative Care      Consents  Anesthetic plan, risks, benefits and alternatives discussed with:  Patient..                 Pedro Finley MD  "

## 2019-01-18 NOTE — PROGRESS NOTES
[facility] Psychiatric Progress Note       Interim History   The patient's care was discussed with the treatment team and chart notes were reviewed. According to our hospital staff members, the patient has appeared to be much brighter. She was visited by her  and children last night, which went well. Additionally, it has been noted that the patient has been attending far more group sessions in comparison to her first week here at Psych. Per staff note, the patient is now able to attain and demonstrate insight into topics within group sessions. She completed her fifth ECT session this morning, and will continue with her last treatment on 1/21/19.      Hospital Course   This is a 33 year old female with a history of major depression (recurrent and severe), anxiety, and borderline personality disorder. She has been hospitalized in the past in regards of her mental health, twice at  in 2018 and another at [facility] in 2015. Her most recent hospitalization being from 03/23/18 - 03/27/18. She follows Drew Juarez NP at [facility]. The patient's Trintellix dosage was increased to 20mg daily, Dr. Madrigal would like to leave all other medications the same at this time. Over the weekend (per attending hospital staff note), the patient has been pleasant, cooperative, medication compliant, however isolative. She made it a goal of hers to be more social with her peers and to avoid her room. She has attended group sessions and participates appropriately. The patient did state that she was still feeling depressed and anxious, however has been denying any thoughts of suicide. The patient has been tolerating her current medication regiment well, however she has been experiencing some mild nausea after the increase in Trintellix made on 1/03. From this, Dr. Madrigal ordered Zofran 4mg to aid in this nausea. Dr. Madrigal also discussed ECT psychiatric treatment with patient. He  informed her of the procedure itself and its possible effects such as short term memory loss (for a short period of time) and also reviews the benefits of this form of treatment. After a day of consideration, the patient agreed to undergo ECT treatment, thus her first session will be on 1/09/19. The patient tolerated her first ECT session well, only attaining a slight headache and mild nausea, Toradol and Zofran were ordered for her upcoming ECT sessions. Again, she has tolerated ECT treatment extremely well and has improved in mood. She has expressed this improvement to both staff members and Dr. Madrigal. Per our [facility] staff members, the patient continues to attend group sessions and participate appropriately. Due to the patient tolerating her current medication regiment well (not attaining any side effects or concerns) there will be no medication changes made by Dr. Madrigal today. The patient continues to require inpatient ECT.      Medications     Current Facility-Administered Medications Ordered in Epic   Medication Dose Route Frequency Last Rate Last Dose     [Auto Hold] acetaminophen (TYLENOL) tablet 650 mg  650 mg Oral Q4H PRN   650 mg at 01/15/19 1705     [Auto Hold] ARIPiprazole (ABILIFY) tablet 10 mg  10 mg Oral Daily   10 mg at 01/17/19 0827     [Auto Hold] cetirizine (zyrTEC) tablet 10 mg  10 mg Oral Daily PRN         [Auto Hold] hydrOXYzine (ATARAX) tablet 25 mg  25 mg Oral Q4H PRN   25 mg at 01/09/19 0936     [Auto Hold] hydrOXYzine (ATARAX) tablet 25-50 mg  25-50 mg Oral BID PRN         lactated ringers infusion   Intravenous Continuous 10 mL/hr at 01/18/19 0622       lactated ringers infusion   Intravenous Continuous 10 mL/hr at 01/16/19 0625       [Auto Hold] mirtazapine (REMERON) tablet 30 mg  30 mg Oral At Bedtime   30 mg at 01/17/19 2056     [Auto Hold] ondansetron (ZOFRAN) injection 4 mg  4 mg Intravenous Q Mon Wed Fri AM         [Auto Hold] ondansetron (ZOFRAN) tablet 4 mg  4 mg Oral  "Daily PRN   4 mg at 01/09/19 0936     [Auto Hold] traZODone (DESYREL) tablet 100 mg  100 mg Oral At Bedtime   100 mg at 01/17/19 2056     [Auto Hold] vortioxetine (TRINTELLIX/BRINTELLIX) tablet 20 mg  20 mg Oral Daily   20 mg at 01/17/19 0827     No current Epic-ordered outpatient medications on file.         Allergies      No Known Allergies     Medical Review of Systems     /70   Pulse 73   Temp 97.4  F (36.3  C) (Temporal)   Resp 17   Ht 1.651 m (5' 5\")   Wt 106.4 kg (234 lb 9.6 oz)   SpO2 96%   BMI 39.04 kg/m    Body mass index is 39.04 kg/m .  A 10-point review of systems was performed by Zhang Madrigal MD and is negative, no new findings.      Psychiatric Examination     Appearance Sitting in chair, dressed in causal clothing. Appears stated age.   Attitude Cooperative   Orientation Oriented to person, place, time   Eye Contact Much improved   Speech Regular rate, rhythm, volume and tone   Language Normal   Psychomotor Behavior No psychomotor agitation or retardation.    Mood Significantly less depressed and anxious   Affect Much less flat, much brighter, more expressive   Thought Process Goal-Oriented, Intact   Associations Intact   Thought Content Patient is currently negative for suicidal ideation, negative for plan or intent, able to contract no self harm and identify barriers to suicide.  Negative for obsessions, compulsions or psychosis.     Fund of Knowledge Intact   Insight Intact   Judgement Intact   Attention Span & Concentration Much more attentive   Recent & Remote Memory Mild short term memory impairment   Gait Normal   Muscle Tone Intact        Labs     Labs reviewed.  No results found for this or any previous visit (from the past 24 hour(s)).     Impression   This is a 33 year old female with a history of major depression (recurrent and severe), anxiety, and borderline personality disorder. She underwent her first ECT session on 1/09/19 and will continue with her sixth " session on 1/21/19.      The patient continues to require inpatient ECT.   Diagnoses   1. Major depression, recurrent, severe, without psychotic features.  2. Generalized Anxiety Disorder  3. Traits of Borderline Personality Disorder     Plan     1. Explained side effects, benefits, and complications of medications to the patient, Pt gave verbal consent.  2. Medication changes: no medication changes   3. Discussed treatment plan with patient and team.  4. Projected length of stay: 1 week  5. ECT session on 1/21/19, this will be her sixth session       Attestation:   Patient has been seen and evaluated by me, Zhang Madrigal MD.    Patient ID:  Name: Misty Parham    MRN: [id number]  Admission: 1/3/2019   YOB: 1985

## 2019-01-18 NOTE — PROCEDURES
[facility] ECT Procedure Note     Misty Parham [id number]   33 year old 1985     Patient Status: Inpatient    No Known Allergies    Weight:  234 lbs 9.6 oz    Patient Preparations: Other (comment)(not present)         Diagnosis:   Major depression       Indications for ECT:   Medications ineffective       Pause for the Cause:     Right patient Yes   Right procedure/laterality settings: Yes   Right diagnosis Yes          Intra-Procedure Documentation:     Date:  1/18/2019  Time:  6:57 AM    ECT #    Treatment number this series: 5   Total treatment number: 5   Type of ECT:  Bilateral, standard    ECT Medications administered: Brevital: 100mg  Succinyl Choline: 100mg   Toradol 30 mg         Clinical Narrative:     ECT was administered by Thymatron machine.  Pt has been medically cleared for procedure, consent signed. Side effects, Risks and benefits reviewed.    ECT Strip Summary:   Energy Level: 90 percent  Motor Seizure Duration: 31 seconds  EEG Seizure Duration: 31 seconds    Complications: No    Plan: 6th ECT 1/21/19  Outpatient Psychiatrist: Drew Juarez NP

## 2019-01-18 NOTE — PLAN OF CARE
Pt had ECT today and spent most of the day in the lounge watching tv. Pt is soft spoken and does not talk unless someone starts a conversation. Pt denies SI, pt told staff that she feels fine. Pt has short term memory after ECT, pt does not remember talking to certain staff. Pt ate all of her meals this shift. Pt was very short with staff when doing her 1-1.

## 2019-01-18 NOTE — PLAN OF CARE
Pt engaged in therapeutic activity addressing functional cognition and interpersonal skills with task set up and MIN A. With initial task set up and direct VCs, pt participated in therapeutic group activity and discussion addressing strategies to increase motivation for improved wellness. Educated pt on strategies to increase motivation and common myths regarding motivation. Pt ID'd strategies to help improve her motivation as wanting to see her children happy and taking things one step at a time. With MOD-MAX A, pt ID'd she could use motivational technique to encourage her to play games and get outside with her children. Pt will continue to benefit from OT intervention to address implementation of positive functional coping skills, role performance, and community reintegration.

## 2019-01-18 NOTE — ANESTHESIA CARE TRANSFER NOTE
Patient: Misty Parham    * No procedures listed *    Diagnosis: * No pre-op diagnosis entered *  Diagnosis Additional Information: No value filed.    Anesthesia Type:   General     Note:  Airway :Nasal Cannula  Patient transferred to:PACU  Comments: Native airway general anesthetic.  Patient hyperventilated with 100% oxygen via mask prior to treatment.   Anesthesia induced using patent peripheral IV.    Bite block placed between molars to protect teeth and tongue.     After induction of seizure patient mask ventilated with 100% oxygen until spontaneous respirations returned.     At time of handoff to PACU, patient exhibited spontaneous respirations, adequate tidal volumes, airway patent. Oxygen via nasal cannula at 2 liters per minute to PACU in cart with siderails up, connected to wall O2 in PACU. All monitors and alarms on and functioning. Report given to PACU RN and questions answered. Handoff Report: Identifed the Patient, Identified the Reponsible Provider, Reviewed the pertinent medical history, Discussed the surgical course, Reviewed Intra-OP anesthesia mangement and issues during anesthesia, Set expectations for post-procedure period and Allowed opportunity for questions and acknowledgement of understanding      Vitals: (Last set prior to Anesthesia Care Transfer)    CRNA VITALS  1/18/2019 0614 - 1/18/2019 0644      1/18/2019             Pulse:  75    SpO2:  100 %    Resp Rate (set):  10                Electronically Signed By: WILLIAN Lovett CRNA  January 18, 2019  6:44 AM

## 2019-01-18 NOTE — PLAN OF CARE
Visited by  and kids tonight. Denies suicidal thoughts when asked. Pt appears brighter, states is feeling better.

## 2019-01-18 NOTE — ANESTHESIA POSTPROCEDURE EVALUATION
Patient: Misty Parham    * No procedures listed *    Diagnosis:* No pre-op diagnosis entered *  Diagnosis Additional Information: No value filed.    Anesthesia Type:  General    Note:  Anesthesia Post Evaluation    Patient location during evaluation: Bedside  Patient participation: Able to fully participate in evaluation  Level of consciousness: awake and alert  Pain management: adequate  Airway patency: patent  Cardiovascular status: acceptable  Respiratory status: acceptable  Hydration status: acceptable  PONV: none             Last vitals:  Vitals:    01/18/19 0705 01/18/19 0710 01/18/19 0715   BP: 110/65 110/68 117/70   Pulse: 73     Resp: 12 10 17   Temp:      SpO2: 99% 96% 96%         Electronically Signed By: Pedro Finley MD  January 18, 2019  7:19 AM

## 2019-01-19 PROCEDURE — 12400000 ZZH R&B MH

## 2019-01-19 PROCEDURE — 25000132 ZZH RX MED GY IP 250 OP 250 PS 637: Performed by: PSYCHIATRY & NEUROLOGY

## 2019-01-19 RX ADMIN — VORTIOXETINE 20 MG: 20 TABLET, FILM COATED ORAL at 08:34

## 2019-01-19 RX ADMIN — TRAZODONE HYDROCHLORIDE 100 MG: 100 TABLET ORAL at 21:29

## 2019-01-19 RX ADMIN — ARIPIPRAZOLE 10 MG: 10 TABLET ORAL at 08:34

## 2019-01-19 RX ADMIN — MIRTAZAPINE 30 MG: 30 TABLET, FILM COATED ORAL at 21:29

## 2019-01-19 NOTE — PLAN OF CARE
"Pt presents a flat, blunt flat affect throughout the shift. Pt spent most of the shift in the lounge watching tv, pt does not socialize with any other pts on the unit. On a 1-1 pt does not state much, pt gives short one word answers and states that she is feeling \"fine.\" Pt denies SI. Pt thinks that she is getting better on the unit. Pt was med compliant.   "

## 2019-01-19 NOTE — PLAN OF CARE
Patient visible this shift, pleasant and cooperative. Visited with  and a few of her children, appeared to go well. Did not participate in groups tonight due to family visiting. Did not have much to offer during staff check-in, said she was doing fine. Denies suicidal ideation & contracts for safety.

## 2019-01-20 PROCEDURE — 25000132 ZZH RX MED GY IP 250 OP 250 PS 637: Performed by: PSYCHIATRY & NEUROLOGY

## 2019-01-20 PROCEDURE — 12400000 ZZH R&B MH

## 2019-01-20 RX ADMIN — TRAZODONE HYDROCHLORIDE 100 MG: 100 TABLET ORAL at 20:52

## 2019-01-20 RX ADMIN — ARIPIPRAZOLE 10 MG: 10 TABLET ORAL at 09:04

## 2019-01-20 RX ADMIN — VORTIOXETINE 20 MG: 20 TABLET, FILM COATED ORAL at 09:04

## 2019-01-20 RX ADMIN — MIRTAZAPINE 30 MG: 30 TABLET, FILM COATED ORAL at 20:52

## 2019-01-20 NOTE — PLAN OF CARE
Pt presents with flat affect and calm mood.   During 1:1, pt expressed she was feeling brighter and that she had a good day.  Pt smiled throughout the conversation.  Pt reported she thinks ECT is helping, and is hoping to be able to discharge after her last treatment. Pt sat out in the lounge for part of the shift and continues to socialize minimally with peers.  Pt denied SI and was medication compliant.

## 2019-01-20 NOTE — PLAN OF CARE
Flat affect but does brighten a bit with conversation when prompted. Pt was more visible during shift, spending less time in her room, attended morning groups. Has ECT #6 tomorrow, patient does feel that it has been helpful. Med compliant

## 2019-01-21 ENCOUNTER — ANESTHESIA EVENT (OUTPATIENT)
Dept: SURGERY | Facility: CLINIC | Age: 34
End: 2019-01-21

## 2019-01-21 ENCOUNTER — ANESTHESIA (OUTPATIENT)
Dept: SURGERY | Facility: CLINIC | Age: 34
End: 2019-01-21

## 2019-01-21 VITALS
RESPIRATION RATE: 16 BRPM | HEIGHT: 65 IN | WEIGHT: 234.6 LBS | TEMPERATURE: 98.1 F | HEART RATE: 56 BPM | OXYGEN SATURATION: 99 % | BODY MASS INDEX: 39.09 KG/M2 | DIASTOLIC BLOOD PRESSURE: 79 MMHG | SYSTOLIC BLOOD PRESSURE: 100 MMHG

## 2019-01-21 PROCEDURE — 25000128 H RX IP 250 OP 636: Performed by: ANESTHESIOLOGY

## 2019-01-21 PROCEDURE — 90853 GROUP PSYCHOTHERAPY: CPT

## 2019-01-21 PROCEDURE — 25000125 ZZHC RX 250: Performed by: ANESTHESIOLOGY

## 2019-01-21 PROCEDURE — 25000132 ZZH RX MED GY IP 250 OP 250 PS 637: Performed by: PSYCHIATRY & NEUROLOGY

## 2019-01-21 PROCEDURE — 25000128 H RX IP 250 OP 636: Performed by: PSYCHIATRY & NEUROLOGY

## 2019-01-21 PROCEDURE — 25000125 ZZHC RX 250: Performed by: NURSE ANESTHETIST, CERTIFIED REGISTERED

## 2019-01-21 PROCEDURE — 25000128 H RX IP 250 OP 636: Performed by: NURSE ANESTHETIST, CERTIFIED REGISTERED

## 2019-01-21 PROCEDURE — 90870 ELECTROCONVULSIVE THERAPY: CPT

## 2019-01-21 PROCEDURE — GZB2ZZZ ELECTROCONVULSIVE THERAPY, BILATERAL-SINGLE SEIZURE: ICD-10-PCS | Performed by: PSYCHIATRY & NEUROLOGY

## 2019-01-21 RX ORDER — ARIPIPRAZOLE 10 MG/1
10 TABLET ORAL DAILY
Qty: 30 TABLET | Refills: 0 | Status: ON HOLD | OUTPATIENT
Start: 2019-01-21 | End: 2019-06-20

## 2019-01-21 RX ORDER — TRAZODONE HYDROCHLORIDE 100 MG/1
100 TABLET ORAL AT BEDTIME
Qty: 30 TABLET | Refills: 0 | Status: ON HOLD | OUTPATIENT
Start: 2019-01-21 | End: 2019-06-20

## 2019-01-21 RX ORDER — ONDANSETRON 4 MG/1
4 TABLET, FILM COATED ORAL DAILY PRN
Qty: 30 TABLET | Refills: 0 | Status: ON HOLD | OUTPATIENT
Start: 2019-01-21 | End: 2019-06-20

## 2019-01-21 RX ORDER — SODIUM CHLORIDE, SODIUM LACTATE, POTASSIUM CHLORIDE, CALCIUM CHLORIDE 600; 310; 30; 20 MG/100ML; MG/100ML; MG/100ML; MG/100ML
INJECTION, SOLUTION INTRAVENOUS ONCE
Status: COMPLETED | OUTPATIENT
Start: 2019-01-21 | End: 2019-01-21

## 2019-01-21 RX ORDER — MIRTAZAPINE 30 MG/1
30 TABLET, FILM COATED ORAL AT BEDTIME
Qty: 30 TABLET | Refills: 0 | Status: ON HOLD | OUTPATIENT
Start: 2019-01-21 | End: 2019-06-20

## 2019-01-21 RX ORDER — KETOROLAC TROMETHAMINE 30 MG/ML
30 INJECTION, SOLUTION INTRAMUSCULAR; INTRAVENOUS EVERY 6 HOURS PRN
Status: DISCONTINUED | OUTPATIENT
Start: 2019-01-21 | End: 2019-01-21 | Stop reason: HOSPADM

## 2019-01-21 RX ADMIN — SODIUM CHLORIDE, POTASSIUM CHLORIDE, SODIUM LACTATE AND CALCIUM CHLORIDE: 600; 310; 30; 20 INJECTION, SOLUTION INTRAVENOUS at 06:16

## 2019-01-21 RX ADMIN — KETOROLAC TROMETHAMINE 30 MG: 30 INJECTION, SOLUTION INTRAMUSCULAR at 06:37

## 2019-01-21 RX ADMIN — VORTIOXETINE 20 MG: 20 TABLET, FILM COATED ORAL at 09:41

## 2019-01-21 RX ADMIN — SUCCINYLCHOLINE CHLORIDE 100 MG: 20 INJECTION, SOLUTION INTRAMUSCULAR; INTRAVENOUS; PARENTERAL at 07:02

## 2019-01-21 RX ADMIN — LIDOCAINE HYDROCHLORIDE 0.5 MG: 10 INJECTION, SOLUTION EPIDURAL; INFILTRATION; INTRACAUDAL; PERINEURAL at 06:16

## 2019-01-21 RX ADMIN — ARIPIPRAZOLE 10 MG: 10 TABLET ORAL at 09:42

## 2019-01-21 RX ADMIN — METHOHEXITAL SODIUM 120 MG: 500 INJECTION, POWDER, LYOPHILIZED, FOR SOLUTION INTRAMUSCULAR; INTRAVENOUS; RECTAL at 07:02

## 2019-01-21 RX ADMIN — ONDANSETRON 4 MG: 2 INJECTION INTRAMUSCULAR; INTRAVENOUS at 06:36

## 2019-01-21 ASSESSMENT — ACTIVITIES OF DAILY LIVING (ADL)
DRESS: INDEPENDENT
ORAL_HYGIENE: INDEPENDENT
HYGIENE/GROOMING: INDEPENDENT
LAUNDRY: WITH SUPERVISION

## 2019-01-21 NOTE — PROCEDURES
[facility] ECT Procedure Note     Misty Parham [id number]   33 year old 1985     Patient Status: Inpatient    No Known Allergies    Weight:  234 lbs 9.6 oz    Patient Preparations: Other (comment)(not present)         Diagnosis:   Major depression       Indications for ECT:   Medications ineffective       Pause for the Cause:     Right patient Yes   Right procedure/laterality settings: Yes   Right diagnosis Yes          Intra-Procedure Documentation:     Date:  1/21/2019  Time:  6:57 AM    ECT #    Treatment number this series: 6   Total treatment number: 6   Type of ECT:  Bilateral, standard    ECT Medications administered: Brevital: 100mg  Succinyl Choline: 100mg   Toradol 30 mg         Clinical Narrative:     ECT was administered by Thymatron machine.  Pt has been medically cleared for procedure, consent signed. Side effects, Risks and benefits reviewed.    ECT Strip Summary:   Energy Level: 100 percent  Motor Seizure Duration: 31 seconds  EEG Seizure Duration: 31 seconds    Complications: No    Plan: done  Outpatient Psychiatrist: Drew Juarez NP

## 2019-01-21 NOTE — PROGRESS NOTES
Patient denies suicidal ideation. Reviewed discharge instructions with  over the phone.  Reviewed discharge instructions with patient and she verbalized understanding of them.  She has a copy of discharge instructions and a return to work sheet was given to her. Discharged to home with her  at 1440.

## 2019-01-21 NOTE — PROGRESS NOTES
[facility] Psychiatric Progress Note       Interim History   The patient's care was discussed with the treatment team and chart notes were reviewed. Per attending hospital staff on [facility] [address], over the weekend the patient was found to be much brighter, however continued to have minimal conversations with her peers. She however had been more present and visibile throughout the SDU unit. The patient completed her ECT course this morning, 1/21/19 with no complications. Patient seen on 1/21/19. Upon interview, the patient declares ECT went well this morning. She reviews her plans after this hospitalization, such as returning to her therapist and Drew Juarez at [facility] Dr. Madrigal encourages these plans.      Hospital Course   This is a 33 year old female with a history of major depression (recurrent and severe), anxiety, and borderline personality disorder. She has been hospitalized in the past in regards of her mental health, twice at  in 2018 and another at [facility] in 2015. Her most recent hospitalization being from 03/23/18 - 03/27/18. She follows Drew Juarez NP at [facility] The patient's Trintellix dosage was increased to 20mg daily, Dr. Madrigal would like to leave all other medications the same at this time. Over the weekend (per attending hospital staff note), the patient has been pleasant, cooperative, medication compliant, however isolative. She made it a goal of hers to be more social with her peers and to avoid her room. She has attended group sessions and participates appropriately. The patient did state that she was still feeling depressed and anxious, however has been denying any thoughts of suicide. The patient has been tolerating her current medication regiment well, however she has been experiencing some mild nausea after the increase in Trintellix made on 1/03. From this, Dr. Madrigal ordered Zofran 4mg to aid in this nausea.  Dr. Madrigal also discussed ECT psychiatric treatment with patient. He informed her of the procedure itself and its possible effects such as short term memory loss (for a short period of time) and also reviews the benefits of this form of treatment. After a day of consideration, the patient agreed to undergo ECT treatment, thus her first session will be on 1/09/19. The patient tolerated her first ECT session well, only attaining a slight headache and mild nausea, Toradol and Zofran were ordered for her upcoming ECT sessions. Again, she has tolerated ECT treatment extremely well and has improved in mood. She has expressed this improvement to both staff members and Dr. Madrigal. Per our [address] staff members, the patient continues to attend group sessions and participate appropriately. Due to the patient tolerating her current medication regiment well (not attaining any side effects or concerns) there will be no medication changes made by Dr. Madrigal today. The patient continues to require inpatient ECT.      Medications     Current Facility-Administered Medications Ordered in Epic   Medication Dose Route Frequency Last Rate Last Dose     [Auto Hold] acetaminophen (TYLENOL) tablet 650 mg  650 mg Oral Q4H PRN   650 mg at 01/15/19 1705     [Auto Hold] ARIPiprazole (ABILIFY) tablet 10 mg  10 mg Oral Daily   10 mg at 01/20/19 0904     [Auto Hold] cetirizine (zyrTEC) tablet 10 mg  10 mg Oral Daily PRN         [Auto Hold] hydrOXYzine (ATARAX) tablet 25 mg  25 mg Oral Q4H PRN   25 mg at 01/09/19 0936     [Auto Hold] hydrOXYzine (ATARAX) tablet 25-50 mg  25-50 mg Oral BID PRN         ketorolac (TORADOL) injection 30 mg  30 mg Intravenous Q6H PRN   30 mg at 01/21/19 0637     lactated ringers infusion   Intravenous Continuous 10 mL/hr at 01/18/19 0622       lactated ringers infusion   Intravenous Continuous 10 mL/hr at 01/16/19 0625       [Auto Hold] mirtazapine (REMERON) tablet 30 mg  30 mg Oral At Bedtime   30 mg at  "01/20/19 2052     [Auto Hold] ondansetron (ZOFRAN) injection 4 mg  4 mg Intravenous Q Mon Wed Fri AM   4 mg at 01/21/19 0636     [Auto Hold] ondansetron (ZOFRAN) tablet 4 mg  4 mg Oral Daily PRN   4 mg at 01/09/19 0936     [Auto Hold] traZODone (DESYREL) tablet 100 mg  100 mg Oral At Bedtime   100 mg at 01/20/19 2052     [Auto Hold] vortioxetine (TRINTELLIX/BRINTELLIX) tablet 20 mg  20 mg Oral Daily   20 mg at 01/20/19 0904     No current Epic-ordered outpatient medications on file.         Allergies      No Known Allergies     Medical Review of Systems     /80   Pulse 87   Temp 97.5  F (36.4  C) (Oral)   Resp 16   Ht 1.651 m (5' 5\")   Wt 106.4 kg (234 lb 9.6 oz)   SpO2 97%   BMI 39.04 kg/m    Body mass index is 39.04 kg/m .  A 10-point review of systems was performed by Zhang Madrigal MD and is negative, no new findings.      Psychiatric Examination     Appearance Sitting in chair, dressed in causal clothing. Appears stated age.   Attitude Cooperative   Orientation Oriented to person, place, time   Eye Contact Much improved   Speech Regular rate, rhythm, volume and tone   Language Normal   Psychomotor Behavior No psychomotor agitation or retardation.    Mood Significantly less depressed and anxious   Affect Much less flat, much brighter, more expressive   Thought Process Goal-Oriented, Intact   Associations Intact   Thought Content Patient is currently negative for suicidal ideation, negative for plan or intent, able to contract no self harm and identify barriers to suicide.  Negative for obsessions, compulsions or psychosis.     Fund of Knowledge Intact   Insight Intact   Judgement Intact   Attention Span & Concentration Much more attentive   Recent & Remote Memory Mild short term memory impairment   Gait Normal   Muscle Tone Intact        Labs     Labs reviewed.  No results found for this or any previous visit (from the past 24 hour(s)).     Impression   This is a 33 year old female with a " history of major depression (recurrent and severe), anxiety, and borderline personality disorder. She underwent her first ECT session on 1/09/19 and completed the course on 1/21/19. The patient has been able to acknowledge her progress in regards of her mental health and believes that ECT has aided in this progress. The patient will be discharged today, where she will follow-up with Drew Juarez at [facility] and her therapist (Eli) at [facility]. She will have maintenance ECT 4 weeks from discharge date.       Diagnoses   1. Major depression, recurrent, severe, without psychotic features.  2. Generalized Anxiety Disorder  3. Traits of Borderline Personality Disorder     Plan     1. Explained side effects, benefits, and complications of medications to the patient, Pt gave verbal consent.  2. Medication changes: no medication changes   3. Discussed treatment plan with patient and team.  4. Projected length of stay: discharge today  5. Follow-up with Drew Juarez at [facility]   6. Follow-up with her therapist (Eli) at [facility]   7. Scheduled maintenance ECT 4 weeks from discharge date       Attestation:   Patient has been seen and evaluated by me, Zhang Madrigal MD.    Patient ID:  Name: Misty Parham    MRN: [id number]  Admission: 1/3/2019   YOB: 1985

## 2019-01-21 NOTE — ANESTHESIA POSTPROCEDURE EVALUATION
Patient: Misty Parham    * No procedures listed *    Diagnosis:* No pre-op diagnosis entered *  Diagnosis Additional Information: No value filed.    Anesthesia Type:  General    Note:  Anesthesia Post Evaluation    Patient location during evaluation: PACU  Patient participation: Able to fully participate in evaluation  Level of consciousness: awake and alert  Pain management: adequate  Airway patency: patent  Cardiovascular status: acceptable  Respiratory status: acceptable and unassisted  Hydration status: acceptable  PONV: none             Last vitals:  Vitals:    01/21/19 0715 01/21/19 0720 01/21/19 0725   BP: 99/68 106/69 101/77   Pulse: 70 80 75   Resp: 20 14 19   Temp:      SpO2:  100% 100%         Electronically Signed By: Luiza Lima MD  January 21, 2019  7:31 AM

## 2019-01-21 NOTE — DISCHARGE INSTRUCTIONS
Behavioral Discharge Planning and Instructions    Summary:   Admitted for worsening depression and anxiety with suicidal ideation.     Main Diagnosis:   Major Depression, recurrent, severe, without psychotic features; Generalized Anxiety Disorder; Traits of Borderline Personality Disorder    Major Treatments, Procedures and Findings:  Psychiatric assessment. Medication adjustment. ECT.     Symptoms to Report: Increased confusion, Losing more sleep, Mood getting worse or Thoughts of suicide    Lifestyle Adjustment:  Follow all treatment recommendations. Develop and follow safety plan. Do not drive for at least one week following your last ECT treatment. If you have lingering memory issues or impaired judgment, do not drive until you have been cleared to do so by your physician.     Psychiatry Follow-up:     [facility]  [address], [address]  [place]  [address]  Phone:  [phone] / Fax:  [phone]  Appointment with Drew Juarez RN on  Monday, February 4 at 1:20 PM    [facility]  [address]  [place]  [address]  Phone:  [phone] / Fax:  [phone]  Appointment with Eli Zamarripa, Therapist on Tuesday, January 22 at 1:30 PM    [facility] - Care Coordinator Mona ([phone])    Resources:   Crisis Intervention: 159.645.5573 or 971-230-9945 (TTY: 224.114.5197).  Call anytime for help.  National [place] [employer] Mental Illness (www.[place].dav[place].org): 976.150.7176 or 616-933-5917.  National Suicide Prevention Line (www.mentalhealthmn.org): 954-114-QIOD (8059)  COPE (Crisis Services for Adults) in [facility]: [phone] (Available 24/7)    General Medication Instructions:   See your medication sheet(s) for instructions.   Take all medicines as directed.  Make no changes unless your doctor suggests them.   Go to all your doctor visits.  Be sure to have all your required lab tests. This way, your medicines  can be refilled on time.  Do not use any drugs not prescribed by your doctor.  Avoid alcohol.

## 2019-01-21 NOTE — PLAN OF CARE
Goal: Team Discussion  Outcome: Adequate for discharge   BEHAVIORAL TEAM DISCUSSION     Participants: Dr. Madrigal, social workers, nurses, psych asst   Progress: Pt is adequate for discharge  Continued Stay Criteria/Rationale: Pt has made improvement, pt will be ready to discharge today   Medical/Physical:N/A  Precautions:        Behavioral Orders   Procedures    Code 1 - Restrict to Unit    Routine Programming       As clinically indicated    Seizure precautions    Status 15       Every 15 minutes.      Plan: Pt is adequate for discharge  Rationale for change in precautions or plan:  will get a follow up appointment scheduled prior to pt discharging.

## 2019-01-21 NOTE — ANESTHESIA PREPROCEDURE EVALUATION
Anesthesia Pre-Procedure Evaluation    Patient: Misty Parham   MRN: [id number] : 1985          Preoperative Diagnosis: * No pre-op diagnosis entered *    * No procedures listed *    Past Medical History:   Diagnosis Date     Depressive disorder      No past surgical history on file.    Anesthesia Evaluation     . Pt has had prior anesthetic.     No history of anesthetic complications          ROS/MED HX    ENT/Pulmonary:      (-) sleep apnea   Neurologic:       Cardiovascular:         METS/Exercise Tolerance:  >4 METS   Hematologic:         Musculoskeletal:         GI/Hepatic:        (-) GERD   Renal/Genitourinary:  - ROS Renal section negative       Endo: Comment: BMI 39    (+) Obesity (BMI 39), .      Psychiatric:     (+) psychiatric history (severe) depression and anxiety      Infectious Disease:         Malignancy:         Other:                          Physical Exam  Normal systems: cardiovascular, pulmonary and dental    Airway   Mallampati: II  TM distance: >3 FB  Neck ROM: full    Dental     Cardiovascular       Pulmonary             Lab Results   Component Value Date    WBC 7.5 2019    HGB 12.1 2019    HCT 37.3 2019     2019     2019    POTASSIUM 3.7 2019    CHLORIDE 108 2019    CO2 25 2019    BUN 10 2019    CR 0.84 2019    GLC 93 2019    CRISTINE 8.7 2019    ALBUMIN 3.7 2019    PROTTOTAL 7.8 2019    ALT 21 2019    AST 20 2019    ALKPHOS 76 2019    BILITOTAL 0.4 2019    TSH 0.80 2019    HCG Negative 2019       Preop Vitals  BP Readings from Last 3 Encounters:   19 118/75   18 107/70   18 119/74    Pulse Readings from Last 3 Encounters:   19 62   18 92      Resp Readings from Last 3 Encounters:   19 16   18 16   18 18    SpO2 Readings from Last 3 Encounters:   19 96%   18 99%   18 97%      Temp Readings from  "Last 1 Encounters:   01/21/19 36.4  C (97.5  F) (Oral)    Ht Readings from Last 1 Encounters:   01/03/19 1.651 m (5' 5\")      Wt Readings from Last 1 Encounters:   01/15/19 106.4 kg (234 lb 9.6 oz)    Estimated body mass index is 39.04 kg/m  as calculated from the following:    Height as of this encounter: 1.651 m (5' 5\").    Weight as of this encounter: 106.4 kg (234 lb 9.6 oz).       Anesthesia Plan      History & Physical Review  History and physical reviewed and following examination; no interval change.    ASA Status:  2 .    NPO Status:  > 8 hours    Plan for General with Other induction.   PONV prophylaxis:  Ondansetron (or other 5HT-3)       Postoperative Care  Postoperative pain management:  IV analgesics.      Consents  Anesthetic plan, risks, benefits and alternatives discussed with:  Patient..                 Luiza Lima MD  "

## 2019-01-21 NOTE — PLAN OF CARE
Pt presents with flat affect, calm mood, and has been withdrawn throughout the shift. Pt stayed in her room the majority of the shift.  During 1:1 pt expressed she wanted to go to bed early because she had ECT #6 in the morning.  Pt did not attend groups.  Pt denied self harm and was medication compliant.

## 2019-01-21 NOTE — DISCHARGE SUMMARY
[facility] Psychiatric Discharge Summary      DATE OF ADMISSION: 1/3/2019     DATE OF DISCHARGE: 1/21/19  PRIMARY CARE PHYSICIAN: Monse Chen    IDENTIFICATION: For history, see dictation by Dr. Madrigal on 1/04/19. For physical summary, see dictation by Mireya Schultz PA-C on 1/03/19.     HOSPITAL COURSE:   This is a 33 year old female with a history of major depression (recurrent and severe), anxiety, and borderline personality disorder. She has been hospitalized in the past in regards of her mental health, twice at  in 2018 and another at [facility] in 2015. Her most recent hospitalization being from 03/23/18 - 03/27/18. She follows Drew Juarez NP at [facility]. The patient's Trintellix dosage was increased to 20mg daily, Dr. Madrigal would like to leave all other medications the same at this time. The patient had made it a goal of hers to be more social with her peers and to avoid her room while being on [facility] [address]. She did state that she was still feeling depressed and anxious, however has been denying any thoughts of suicide. The patient has been tolerating her current medication regiment well, however she has been experiencing some mild nausea after the increase in Trintellix made on 1/03. From this, Dr. Madrigal ordered Zofran 4mg to aid in this nausea. Dr. Madrigal also discussed ECT psychiatric treatment with patient. He informed her of the procedure itself and its possible effects such as short term memory loss (for a short period of time) and also reviewed the benefits of this form of treatment. After a day of consideration, the patient agreed to undergo ECT treatment, thus her first session was on 1/09/19. The patient tolerated her first ECT session well, only attaining a slight headache and mild nausea, Toradol and Zofran were ordered for her ECT sessions. Again, she has tolerated ECT treatment extremely well and has improved in mood.  "She has expressed this improvement to both staff members and Dr. Madrigal. Due to the patient tolerating her current medication regiment well (not attaining any side effects or concerns) there have been no medication changes made by Dr. Madrigal. The patient completed her ECT course on 1/21/19.     DISCHARGE MENTAL STATUS EXAMINATION:  Appearance Sitting in chair, dressed in causal clothing. Appears stated age.   Attitude Cooperative   Orientation Oriented to person, place, time   Eye Contact Much improved   Speech Regular rate, rhythm, volume and tone   Language Normal   Psychomotor Behavior No psychomotor agitation or retardation.    Mood Significantly less depressed and anxious   Affect Much less flat, much brighter, more expressive   Thought Process Goal-Oriented, Intact   Associations Intact   Thought Content Patient is currently negative for suicidal ideation, negative for plan or intent, able to contract no self harm and identify barriers to suicide.  Negative for obsessions, compulsions or psychosis.     Fund of Knowledge Intact   Insight Intact   Judgement Intact   Attention Span & Concentration Much more attentive   Recent & Remote Memory Mild short term memory impairment   Gait Normal   Muscle Tone Intact       LABORATORY DATA:    Refer to hospitalist admission dictation.  No results found for this or any previous visit (from the past 24 hour(s)).     /77   Pulse 76   Temp 97.5  F (36.4  C) (Oral)   Resp 15   Ht 1.651 m (5' 5\")   Wt 106.4 kg (234 lb 9.6 oz)   SpO2 99%   BMI 39.04 kg/m       DISCHARGE MEDICATIONS:      Review of your medicines      UNREVIEWED medicines. Ask your doctor about these medicines      Dose / Directions   ARIPiprazole 10 MG tablet  Commonly known as:  ABILIFY  Used for:  Recurrent major depressive disorder, in remission (H)      Dose:  10 mg  Take 1 tablet (10 mg) by mouth daily  Quantity:  30 tablet  Refills:  1     cetirizine 10 MG tablet  Commonly known as:  " zyrTEC      Dose:  10 mg  Take 10 mg by mouth daily as needed for allergies  Refills:  0     hydrOXYzine 25 MG tablet  Commonly known as:  ATARAX      Dose:  25-50 mg  Take 25-50 mg by mouth 2 times daily as needed for itching  Refills:  0     mirtazapine 30 MG tablet  Commonly known as:  REMERON      Dose:  30 mg  Take 30 mg by mouth At Bedtime  Refills:  0     TRAZODONE HCL PO      Dose:  100 mg  Take 100 mg by mouth At Bedtime  Refills:  0     vortioxetine 10 MG tablet  Commonly known as:  TRINTELLIX/BRINTELLIX      Dose:  10 mg  Take 10 mg by mouth daily  Refills:  0     ZOFRAN ODT PO      Dose:  4 mg  Take 4 mg by mouth every 8 hours as needed for nausea  Refills:  0            DISCHARGE DIAGNOSES:  1. Major depression, recurrent, severe, without psychotic features.  2. Generalized Anxiety Disorder    DISCHARGE PLAN:   1. Continue with current medication regiment  2. Follow-up with Drew Juarez NP at [facility]  3. Follow-up with therapist (Eli) at [facility]   4. Scheduled for maintenance ECT 4 weeks from discharge date    DISCHARGE FOLLOW-UP:  See Reconciliation Note    Attestation:   Patient has been seen and evaluated by me, Zhang Madrigal MD.    Patient ID:    Name: Misty Parham MRN: [id number]  Admission: 1/3/2019  YOB: 1985

## 2019-01-21 NOTE — ANESTHESIA CARE TRANSFER NOTE
Patient: Misty Parham    * No procedures listed *    Diagnosis: * No pre-op diagnosis entered *  Diagnosis Additional Information: No value filed.    Anesthesia Type:   General     Note:  Airway :Nasal Cannula  Patient transferred to:PACU  Comments: Native airway general anesthetic.  Patient hyperventilated with 100% oxygen via mask prior to treatment.   Anesthesia induced using patent peripheral IV.    Bite block placed between molars to protect teeth and tongue.     After induction of seizure patient mask ventilated with 100% oxygen until spontaneous respirations returned.     At time of handoff to PACU, patient exhibited spontaneous respirations, adequate tidal volumes, airway patent. Oxygen via nasal cannula at 4 liters per minute to PACU in cart with siderails up, connected to wall O2 in PACU. All monitors and alarms on and functioning. Report given to PACU RN and questions answered. Handoff Report: Identifed the Patient, Identified the Reponsible Provider, Reviewed the pertinent medical history, Discussed the surgical course, Reviewed Intra-OP anesthesia mangement and issues during anesthesia, Set expectations for post-procedure period and Allowed opportunity for questions and acknowledgement of understanding      Vitals: (Last set prior to Anesthesia Care Transfer)    CRNA VITALS  1/21/2019 0644 - 1/21/2019 0714      1/21/2019             Resp Rate (set):  10                Electronically Signed By: WILLIAN Gandara CRNA  January 21, 2019  7:14 AM

## 2019-06-20 ENCOUNTER — HOSPITAL ENCOUNTER (INPATIENT)
Facility: CLINIC | Age: 34
LOS: 13 days | Discharge: HOME | End: 2019-07-03
Attending: PSYCHIATRY & NEUROLOGY | Admitting: PSYCHIATRY & NEUROLOGY
Payer: COMMERCIAL

## 2019-06-20 DIAGNOSIS — F33.2 SEVERE EPISODE OF RECURRENT MAJOR DEPRESSIVE DISORDER, WITHOUT PSYCHOTIC FEATURES (H): Primary | ICD-10-CM

## 2019-06-20 PROBLEM — F32.9 MAJOR DEPRESSION: Status: ACTIVE | Noted: 2019-06-20

## 2019-06-20 LAB
AMPHETAMINES UR QL SCN: NEGATIVE
ANION GAP SERPL CALCULATED.3IONS-SCNC: 3 MMOL/L (ref 3–14)
BARBITURATES UR QL: NEGATIVE
BENZODIAZ UR QL: NEGATIVE
BUN SERPL-MCNC: 10 MG/DL (ref 7–30)
CALCIUM SERPL-MCNC: 9.1 MG/DL (ref 8.5–10.1)
CANNABINOIDS UR QL SCN: NEGATIVE
CHLORIDE SERPL-SCNC: 109 MMOL/L (ref 94–109)
CO2 SERPL-SCNC: 28 MMOL/L (ref 20–32)
COCAINE UR QL: NEGATIVE
CREAT SERPL-MCNC: 0.86 MG/DL (ref 0.52–1.04)
ERYTHROCYTE [DISTWIDTH] IN BLOOD BY AUTOMATED COUNT: 15.1 % (ref 10–15)
GFR SERPL CREATININE-BSD FRML MDRD: 87 ML/MIN/{1.73_M2}
GLUCOSE SERPL-MCNC: 94 MG/DL (ref 70–99)
HCG UR QL: NEGATIVE
HCT VFR BLD AUTO: 38.7 % (ref 35–47)
HGB BLD-MCNC: 12.6 G/DL (ref 11.7–15.7)
MCH RBC QN AUTO: 28.4 PG (ref 26.5–33)
MCHC RBC AUTO-ENTMCNC: 32.6 G/DL (ref 31.5–36.5)
MCV RBC AUTO: 87 FL (ref 78–100)
OPIATES UR QL SCN: NEGATIVE
PCP UR QL SCN: NEGATIVE
PLATELET # BLD AUTO: 308 10E9/L (ref 150–450)
POTASSIUM SERPL-SCNC: 3.8 MMOL/L (ref 3.4–5.3)
RBC # BLD AUTO: 4.44 10E12/L (ref 3.8–5.2)
SODIUM SERPL-SCNC: 140 MMOL/L (ref 133–144)
TSH SERPL DL<=0.005 MIU/L-ACNC: 1.18 MU/L (ref 0.4–4)
WBC # BLD AUTO: 8.4 10E9/L (ref 4–11)

## 2019-06-20 PROCEDURE — 99222 1ST HOSP IP/OBS MODERATE 55: CPT | Mod: AI | Performed by: PHYSICIAN ASSISTANT

## 2019-06-20 PROCEDURE — 81025 URINE PREGNANCY TEST: CPT | Performed by: PSYCHIATRY & NEUROLOGY

## 2019-06-20 PROCEDURE — 80048 BASIC METABOLIC PNL TOTAL CA: CPT | Performed by: PSYCHIATRY & NEUROLOGY

## 2019-06-20 PROCEDURE — 93005 ELECTROCARDIOGRAM TRACING: CPT

## 2019-06-20 PROCEDURE — 93010 ELECTROCARDIOGRAM REPORT: CPT | Performed by: INTERNAL MEDICINE

## 2019-06-20 PROCEDURE — 80307 DRUG TEST PRSMV CHEM ANLYZR: CPT | Performed by: PSYCHIATRY & NEUROLOGY

## 2019-06-20 PROCEDURE — 25000132 ZZH RX MED GY IP 250 OP 250 PS 637: Performed by: PSYCHIATRY & NEUROLOGY

## 2019-06-20 PROCEDURE — 85027 COMPLETE CBC AUTOMATED: CPT | Performed by: PSYCHIATRY & NEUROLOGY

## 2019-06-20 PROCEDURE — 12400000 ZZH R&B MH

## 2019-06-20 PROCEDURE — 36415 COLL VENOUS BLD VENIPUNCTURE: CPT | Performed by: PSYCHIATRY & NEUROLOGY

## 2019-06-20 PROCEDURE — 84443 ASSAY THYROID STIM HORMONE: CPT | Performed by: PSYCHIATRY & NEUROLOGY

## 2019-06-20 RX ORDER — HYDROXYZINE HYDROCHLORIDE 25 MG/1
25 TABLET, FILM COATED ORAL EVERY 4 HOURS PRN
Status: DISCONTINUED | OUTPATIENT
Start: 2019-06-20 | End: 2019-07-03 | Stop reason: HOSPADM

## 2019-06-20 RX ORDER — ARIPIPRAZOLE 10 MG/1
10 TABLET ORAL DAILY
Status: ON HOLD | COMMUNITY
End: 2019-07-03

## 2019-06-20 RX ORDER — TRAZODONE HYDROCHLORIDE 100 MG/1
100 TABLET ORAL AT BEDTIME
Status: ON HOLD | COMMUNITY
End: 2019-07-03

## 2019-06-20 RX ORDER — TRAZODONE HYDROCHLORIDE 100 MG/1
100 TABLET ORAL AT BEDTIME
Status: DISCONTINUED | OUTPATIENT
Start: 2019-06-20 | End: 2019-07-03 | Stop reason: HOSPADM

## 2019-06-20 RX ORDER — MIRTAZAPINE 30 MG/1
30 TABLET, FILM COATED ORAL AT BEDTIME
Status: ON HOLD | COMMUNITY
End: 2019-07-03

## 2019-06-20 RX ORDER — ARIPIPRAZOLE 10 MG/1
10 TABLET ORAL DAILY
Status: DISCONTINUED | OUTPATIENT
Start: 2019-06-21 | End: 2019-07-03 | Stop reason: HOSPADM

## 2019-06-20 RX ORDER — MIRTAZAPINE 30 MG/1
30 TABLET, FILM COATED ORAL AT BEDTIME
Status: DISCONTINUED | OUTPATIENT
Start: 2019-06-20 | End: 2019-07-03 | Stop reason: HOSPADM

## 2019-06-20 RX ORDER — ACETAMINOPHEN 325 MG/1
650 TABLET ORAL EVERY 4 HOURS PRN
Status: DISCONTINUED | OUTPATIENT
Start: 2019-06-20 | End: 2019-07-03 | Stop reason: HOSPADM

## 2019-06-20 RX ORDER — HYDROXYZINE HYDROCHLORIDE 25 MG/1
25 TABLET, FILM COATED ORAL 3 TIMES DAILY PRN
Status: ON HOLD | COMMUNITY
End: 2020-01-16

## 2019-06-20 RX ADMIN — TRAZODONE HYDROCHLORIDE 100 MG: 100 TABLET ORAL at 20:31

## 2019-06-20 RX ADMIN — MIRTAZAPINE 30 MG: 30 TABLET, FILM COATED ORAL at 20:31

## 2019-06-20 RX ADMIN — ACETAMINOPHEN 650 MG: 325 TABLET, FILM COATED ORAL at 19:46

## 2019-06-20 ASSESSMENT — ACTIVITIES OF DAILY LIVING (ADL)
RETIRED_EATING: 0-->INDEPENDENT
COGNITION: 1 - ATTENTION OR MEMORY DEFICITS
SWALLOWING: 0-->SWALLOWS FOODS/LIQUIDS WITHOUT DIFFICULTY
TRANSFERRING: 0-->INDEPENDENT
RETIRED_COMMUNICATION: 0-->UNDERSTANDS/COMMUNICATES WITHOUT DIFFICULTY
DRESS: SCRUBS (BEHAVIORAL HEALTH)
WHICH_OF_THE_ABOVE_FUNCTIONAL_RISKS_HAD_A_RECENT_ONSET_OR_CHANGE?: COGNITION
TOILETING: 0-->INDEPENDENT
BATHING: 0-->INDEPENDENT
AMBULATION: 0-->INDEPENDENT
FALL_HISTORY_WITHIN_LAST_SIX_MONTHS: NO
DRESS: 0-->INDEPENDENT
ORAL_HYGIENE: INDEPENDENT
HYGIENE/GROOMING: INDEPENDENT

## 2019-06-20 ASSESSMENT — MIFFLIN-ST. JEOR: SCORE: 1736.89

## 2019-06-20 NOTE — PHARMACY-ADMISSION MEDICATION HISTORY
Admission medication history interview status for the 6/20/2019  admission is complete. See EPIC admission navigator for prior to admission medications     Medication history source reliability:Good    Actions taken by pharmacist (provider contacted, etc):None     Additional medication history information not noted on PTA med list :None    Medication reconciliation/reorder completed by provider prior to medication history? No    Time spent in this activity: 15min    Prior to Admission medications    Medication Sig Last Dose Taking? Auth Provider   ARIPiprazole (ABILIFY) 10 MG tablet Take 10 mg by mouth daily 6/20/2019 at Unknown time Yes Unknown, Entered By History   hydrOXYzine (ATARAX) 25 MG tablet Take 25 mg by mouth 3 times daily as needed for anxiety 6/20/2019 at am Yes Unknown, Entered By History   mirtazapine (REMERON) 30 MG tablet Take 30 mg by mouth At Bedtime 6/19/2019 at hs Yes Unknown, Entered By History   traZODone (DESYREL) 100 MG tablet Take 100 mg by mouth At Bedtime 6/19/2019 at hs Yes Unknown, Entered By History   vortioxetine (TRINTELLIX/BRINTELLIX) 20 MG tablet Take 20 mg by mouth daily 6/20/2019 at Unknown time Yes Unknown, Entered By History

## 2019-06-20 NOTE — PROGRESS NOTES
06/20/19 1151   Patient Belongings   Did you bring any home meds/supplements to the hospital?  No   Patient Belongings other (see comments)   Belongings Search Yes   Clothing Search Yes   Second Staff Lety   Comment Pt belongings put in pt's locker     Purse  Phone  Wallet  Note pad  Gum  Phone case    Hand   miscellaneous papers  Immunization cards  Pouch with bead in it  Planner  Business cards  Keys x3 (including car key)  Ibuprofen   Hydroxyzine  Loose change  Roll on body oil  Lip balm  Pens  [place]  License   Insurance card  Reward cards  AAA card  Shoes  Black pants  Blue long sleeve shirt  Striped long sleeve shirt    Security Envelope #[id number]  [employer] [place] [employer] Gift Card#[id number]  Visa #[id number]  [employer] card #[id number]  Visa #[id number]  A               Admission:  I am responsible for any personal items that are not sent to the safe or pharmacy.  [facility] is not responsible for loss, theft or damage of any property in my possession.    Signature:  _________________________________ Date: _______  Time: _____                                              Staff Signature:  ____________________________ Date: ________  Time: _____      2nd Staff person, if patient is unable/unwilling to sign:    Signature: ________________________________ Date: ________  Time: _____     Discharge:  [facility] has returned all of my personal belongings:    Signature: _________________________________ Date: ________  Time: _____                                          Staff Signature:  ____________________________ Date: ________  Time: _____

## 2019-06-20 NOTE — H&P
Admitted:     06/20/2019      PSYCHIATRIC ADMISSION HISTORY AND PHYSICAL       PRIMARY CARE PROVIDER:  PARVIN Soto.       CHIEF COMPLAINT:  The patient is a direct admit from outpatient Psychiatric Clinic due to worsening depression with suicidal ideation.      HISTORY OF PRESENT ILLNESS:  Misty Parham is a 33-year-old female with a past medical history significant for major depressive disorder with suicidal ideation, generalized anxiety disorder, borderline personality traits, insomnia, allergic rhinitis and obesity who was directly admitted to Inpatient Psychiatric Service from outpatient Psychiatric Clinic due to worsening depression with suicidal ideation.      The patient was seen by her primary psychiatric provider, nurse practitioner, Cuco Juarez, and was found to have worsening depression with suicidal ideation and was directly admitted to [facility] Inpatient Psychiatric Service.  Upon review of electronic medical record, it appears the patient was hospitalized in 01/2019 here at [facility] In[facility] and was started on Trintellix.  Since then, the patient has stopped this medication due to concerns for GI issues and upset.  In the last 3 months, it appears she has had worsening depression with suicidal ideation and decreased energy to the point that she has been missing work.  The Hospitalist Service has been consulted to do a medical admission H and P and to assess for ECT.      I evaluated the patient in her hospital room where she was lying comfortably in bed upon arrival.  The patient stated that she is feeling tired and this has been worsening over the last month or so.  She is sleeping so so and has noted some decreased appetite.  She currently complains of a headache, but this is not a chronic issue for her.  She states that her vision might be slightly blurred and she might need to get her classes changed or updated.  She currently complains of some nausea  and slight abdominal pain but indicates that she believes it is due to medications.  She has also been experiencing left shoulder soreness and achiness but states she does have to lift things at work.  She does feel as though she bruises easily and she does experience occasional lightheadedness.      PAST MEDICAL HISTORY:   1.  Major depressive disorder with suicidal ideation.   2.  Generalized anxiety disorder.   3.  Borderline personality traits.   4.  Insomnia.   5.  Allergic rhinitis.   6.  Morbid obesity.      PAST SURGICAL HISTORY:  Cholecystectomy.      PRIOR TO ADMIT MEDICATIONS:    Prior to Admission Medications   Prescriptions Last Dose Informant Patient Reported? Taking?   ARIPiprazole (ABILIFY) 10 MG tablet 6/20/2019 at Unknown time  Yes Yes   Sig: Take 10 mg by mouth daily   hydrOXYzine (ATARAX) 25 MG tablet 6/20/2019 at am  Yes Yes   Sig: Take 25 mg by mouth 3 times daily as needed for anxiety   mirtazapine (REMERON) 30 MG tablet 6/19/2019 at hs  Yes Yes   Sig: Take 30 mg by mouth At Bedtime   traZODone (DESYREL) 100 MG tablet 6/19/2019 at hs  Yes Yes   Sig: Take 100 mg by mouth At Bedtime   vortioxetine (TRINTELLIX/BRINTELLIX) 20 MG tablet 6/20/2019 at Unknown time  Yes Yes   Sig: Take 20 mg by mouth daily      Facility-Administered Medications: None        ALLERGIES:  NO KNOWN DRUG ALLERGIES.      SOCIAL HISTORY:  The patient currently resides in a house in [place] with her mother and stepfather.  She denies tobacco use historical or recent.  Denies illicit drug use, and indicates she consumes alcohol maybe once a month.      FAMILY MEDICAL HISTORY:   1.  Father is a diabetic, has high blood pressure and high cholesterol.   2.  Maternal grandfather was a diabetic and had blood clots in his leg and lung.   3.  Maternal grandmother had breast cancer and lung cancer.   4.  Paternal grandmother had heart disease with known MI.      REVIEW OF SYSTEMS:  A 10-point review of systems was performed.  All  pertinent positives are listed in the History of Present Illness, otherwise is negative.      PHYSICAL EXAMINATION:   VITAL SIGNS:  Temperature is 98.2 degrees Fahrenheit with a blood pressure 123/83, heart rate of 79 beats per minute, respiratory rate of 16, O2 saturation 100% on room air.    The patient does complain of abdominal discomfort and nausea, otherwise is not giving a pain rating.   GENERAL:  The patient is awake, alert, cooperative, in no apparent distress, alert and oriented x 3.   HEENT:  Normocephalic, atraumatic.  Moist mucous membranes present.  No exudates noted in the posterior pharynx.  Uvula is midline.  Eyes:  Pupils are equal, round, reactive to light.  Extraocular movements are intact.  Normal sclerae.   NECK:  Supple, normal range of motion, no tracheal deviation.  No cervical lymphadenopathy  present.   CARDIOVASCULAR:  Regular rate and rhythm, no rubs, murmurs or gallops appreciated.   PULMONARY:  Lungs are clear to auscultation bilaterally, no wheezes, rhonchi or rales appreciated.   GASTROINTESTINAL:  Bowel sounds are present in all 4 quadrants, soft, nontender, nondistended, obese.   NEUROLOGIC:  Cranial nerves II-XII are grossly intact.  The patient demonstrates equal sensation, coordination and strength in the upper and lower extremities bilaterally.   EXTREMITIES:  No lower extremity edema noted bilaterally.  Calves are nontender to palpation.      ASSESSMENT AND PLAN:  Misty Parham is a 33-year-old female with a past medical history significant for major depressive disorder with suicidal ideation, generalized anxiety disorder, borderline personality traits, insomnia, allergic rhinitis, and morbid obesity who was directly admitted to Inpatient Psychiatric Service due to worsening depression with suicidal ideation.   1.  Worsening depression with suicidal ideation in the setting of known depression, generalized anxiety disorder, and borderline personality traits:  Psychiatric service  will be managing at this point.  An EKG has been performed and reviewed and shows normal sinus rhythm without ischemic changes.  The patient is without chest pain and has no known history of seizure disorder and is not currently on any medications.  The patient is low risk for ECT.   2.  Insomnia:  The patient currently takes trazodone 100 mg every evening at bedtime that I believe has been restarted.   3.  Allergic rhinitis:  This appears to be stable.  No interventions appear necessary.   4.  Morbid obesity:  The patient's BMI is calculated at 38.19.  The patient should follow up with primary care provider regarding outpatient management.   5.  Deep venous thrombosis prophylaxis:  Would encourage ambulation.      CODE STATUS:  Full code.      DISPOSITION:  Ultimately up to Psychiatric Service.      The patient was discussed with Dr. Virgie Black, who agrees with the assessment and plan as stated above.  Dr. Black will evaluate the patient independently.      At this time, I would like to thank Dr. Madrigal for consulting the Hospitalist Service.  The patient appears medically stable and as such, the Hospitalist Service will sign off once laboratory studies have been evaluated.         VIRGIE BLACK MD       As dictated by MADHU PADILLA PA-C            D: 2019   T: 2019   MT:       Name:     CHASITY PERKINS   MRN:      [id number]        Account:      [id number]   :      1985        Admitted:     2019                   Document: [id number]       cc: Monse MELENDREZ

## 2019-06-20 NOTE — PLAN OF CARE
33 year old female received as a direct admission from [facility] due to increased depression with suicidal ideation. Reports that after discharge in January 2019 she stopped Trintellix due to GI issues. Over the last 3 months has been decompensating with increased depression, lack of energy (at times missing work because she can't get out of bed) and suicidal ideation. Contracts for safety here in the hospital. Very depressed in presentation. Hopes to pursue ECT and medication adjustments.     Nursing assessment complete including patient and medication profiles. Risk assessments completed addressing suicide,fall,skin,nutrition and safety issues. Care plan initiated. Assessments reviewed with physician and admit orders received. Video monitoring in progress, Patient Informed. Welcome packet reviewed with patient. Information reviewed includes getting emergency help, preventing infections, understanding your care, using medication safely, reducing falls, preventing pressure ulcers, smoking cessation, powerful choices and Patients Bill of Rights. Pt. given tour of the unit and instruction on use of facility including emergency call light. Program schedule reviewed with patient. Questions regarding the unit addressed. Pt. Search completed and belongings inventoried.

## 2019-06-20 NOTE — H&P
[facility] Psychiatric H&P Note       Initial History     The patient's care was discussed with the treatment team and chart notes were reviewed.     Patient examined for psychiatric admission.     IDENTIFICATION  Patient is a 33 year old  female who currently resides in [place], [place] with her children. Patient follows Drew Juarez NP for psychiatric care through [facility]. Pt sees PCP Monse Wilde. Pt seen on 6/20/19 by Dr. Madrigal.    CHIEF COMPLAINT  Worsening depression     HISTORY OF PRESENT ILLNESS  This is a 33 year old  female with a previous psychiatric history that includes major depression, generalized anxiety, and borderline personality disorder. She obtains three previous inpatient mental health hospitalizations, her most recent being approximately 5 months ago here at [facility]. During that hospitalizations, the patient underwent 6 bilateral ECT sessions in which she responded well to and underwent maintenance ECT after discharge. She currently follows Drew Juarez NP through [facility] for psychiatric care. Patient does not attain any chemical dependency history. For this admission, the patient was directly presented to [facility] from [facility] (afternoon of 6/20/19) after meeting with Drew Juarez NP. While seen at [facility], the patient had presented with worsening depression (over the past two weeks) along with suicidal ideation. During her meeting with Drew Juarez, the patient had reported functioning very poorly. Her motivation has been lower than normal, which has caused daily activities and duties hard to complete. She declared daily thoughts of death, and has planned to overdose by taking all of her medications. Patient does not have any support at home and hasn't meet with her therapist in awhile. Patient was deemed appropriate for inpatient level of care by Drew Juarez for safe  "stabilization along with patient possibly undergoing 3 bilateral ECT sessions as she has in the past. Patient was in agreement with being treated and cared for as inpatient. She underwent her first ECT session on 6/21/19.     CHEMICAL DEPENDENCY HISTORY  Patient does not obtain a chemical dependency history. She has never undergone chemical dependency treatment. Denies illicit drug use.     PAST  PSYCHIATRIC HISTORY  The patient obtains three previous inpatient mental health hospitalizations. Two here at [facility] and the other at [facility] in 2015. Her most recent admission was here at [facility] from 1/04/19 to 1/21/19. During that admission, the patient underwent 6 bilateral ECT series in which patient responded well to. She has followed Drew Juarez NP through [facility] for psychiatric care.     Prior to admission medications include: Trintellix 20mg daily, Abilify 10mg daily, Remeron 30mg at bedtime, and Atarax 25-50mg BID PRN    Previous psychiatric medications include: Wellbutrin, Hydroxyzine, and Zoloft     FAMILY HISTORY  Patient believes that her mother had depression and had issues with alcohol. Her biological father emily had issues with mental illness and chemicals. Her maternal aunt likely has depression and chemical use as well. Her eldest son has started exhibiting the same symptoms as the patient. She states that her family is \"anti-doctor.\"    SOCIAL HISTORY  The patient was raised as one of three children before her parents got a divorce. She has one adopted brother. She declares that her family was supportive and caring throughout her childhood. She graduated high school and did not obtain further education. Patient was  to her  from [facility] for 15 years, however they have  (have been for the past 5-6 years). The couple has biological child together. Her ex- provides little support in regards of their children. 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      Medications     Medications Prior to Admission   Medication Sig Dispense Refill Last Dose     ARIPiprazole (ABILIFY) 10 MG tablet Take 1 tablet (10 mg) by mouth daily 30 tablet 0      mirtazapine (REMERON) 30 MG tablet Take 1 tablet (30 mg) by mouth At Bedtime 30 tablet 0      [] ondansetron (ZOFRAN) 4 MG tablet Take 1 tablet (4 mg) by mouth daily as needed for nausea or vomiting 30 tablet 0      traZODone (DESYREL) 100 MG tablet Take 1 tablet (100 mg) by mouth At Bedtime 30 tablet 0      [] vortioxetine (TRINTELLIX/BRINTELLIX) 20 MG tablet Take 1 tablet (20 mg) by mouth daily 30 tablet 0        Scheduled Medications:    PRNs:        Allergies      No Known Allergies     Previous Medical History     Past Medical History:   Diagnosis Date     Depressive disorder         Medical Review of Systems     /83   Pulse 79   Temp 98.2  F (36.8  C) (Oral)   Resp 16   Ht 1.651 m (5' 5\")   Wt 104.1 kg (229 lb 8 oz)   SpO2 100%   BMI 38.19 kg/m    Body mass index is 38.19 kg/m .    Previous 10-point ROS completed by Theodore Ji PA-C on 19 reviewed by Zhang Madrigal MD on 2019 and is unchanged except for those problems mentioned within the HPI.     Mental Status Examination     Appearance Sitting in chair, dressed in hospital scrubs. Appears stated age.   Attitude Cooperative   Orientation Oriented to person, place, time   Eye Contact Fair   Speech Regular rate, rhythm, volume and tone   Language Normal   Psychomotor Behavior Normal   Mood Depressed   Affect Flat   Thought Process Goal-Oriented, Intact   Associations Intact   Thought Content Patient is currently negative for suicidal ideation, negative for plan or intent, able to contract no self harm and identify barriers to suicide.  Negative for obsessions, compulsions or psychosis.     Fund of Knowledge Intact   Insight Fair   Judgement Fair    Attention Span & " Concentration Fair   Recent & Remote Memory Intact   Gait Normal   Muscle Tone Intact      Labs     Labs reviewed.  No results found for this or any previous visit (from the past 24 hour(s)).       Impression     This is a 33 year old  female with a previous psychiatric history that includes major depression, generalized anxiety, and borderline personality disorder. She obtains three previous inpatient mental health hospitalizations, her most recent being approximately 5 months ago here at [facility]. During that hospitalizations, the patient underwent 6 bilateral ECT sessions in which she responded well to and then went on to continue with maintenance ECT after discharge. She currently follows Drew Juarez NP through [facility] for psychiatric care. Patient does not attain any chemical dependency history. For this admission, the patient was directly presented to [facility] from [facility] after meeting with Drew Juarez NP on 6/20/19. She appeared severely depressed along with attaining suicidal ideation while seen at [facility]. Patient was deemed appropriate for inpatient level of care for stabilization and to undergo a series of 3 bilateral ECT sessions as inpatient. Patient underwent her first session on 6/21/19.     Assessment of Suicide Risk: Patient is currently negative for suicidal ideation, negative for plan or intent, able to contract no self harm and identify barriers to suicide.     Diagnoses   1. Major depression, recurrent, severe, without psychotic features.  2. Generalized Anxiety Disorder  3. Traits of Borderline Personality Disorder     Plan     1. Explained side effects, benefits, and complications of medications to the patient, Pt gave verbal consent.  2. Medication changes: None  3. Discussed treatment plan with patient and team.  4. Projected length of stay: 2 weeks  5. Patient will undergo ECT #2 on 6/24/19    Attestation:   Patient has been seen  and evaluated by me, Zhang Madrigal MD.    Patient ID:  Name: Misty Parham MRN: [id number]  Admission: 6/20/2019 YOB: 1985

## 2019-06-21 ENCOUNTER — ANESTHESIA EVENT (OUTPATIENT)
Dept: SURGERY | Facility: CLINIC | Age: 34
End: 2019-06-21

## 2019-06-21 ENCOUNTER — ANESTHESIA (OUTPATIENT)
Dept: SURGERY | Facility: CLINIC | Age: 34
End: 2019-06-21

## 2019-06-21 PROCEDURE — 90870 ELECTROCONVULSIVE THERAPY: CPT

## 2019-06-21 PROCEDURE — 25800030 ZZH RX IP 258 OP 636: Performed by: ANESTHESIOLOGY

## 2019-06-21 PROCEDURE — 25000132 ZZH RX MED GY IP 250 OP 250 PS 637: Performed by: PSYCHIATRY & NEUROLOGY

## 2019-06-21 PROCEDURE — 25000125 ZZHC RX 250: Performed by: ANESTHESIOLOGY

## 2019-06-21 PROCEDURE — 12400000 ZZH R&B MH

## 2019-06-21 PROCEDURE — 25000128 H RX IP 250 OP 636: Performed by: PSYCHIATRY & NEUROLOGY

## 2019-06-21 PROCEDURE — 25000125 ZZHC RX 250: Performed by: NURSE ANESTHETIST, CERTIFIED REGISTERED

## 2019-06-21 PROCEDURE — 25000128 H RX IP 250 OP 636: Performed by: NURSE ANESTHETIST, CERTIFIED REGISTERED

## 2019-06-21 PROCEDURE — GZB2ZZZ ELECTROCONVULSIVE THERAPY, BILATERAL-SINGLE SEIZURE: ICD-10-PCS | Performed by: PSYCHIATRY & NEUROLOGY

## 2019-06-21 RX ORDER — SODIUM CHLORIDE, SODIUM LACTATE, POTASSIUM CHLORIDE, CALCIUM CHLORIDE 600; 310; 30; 20 MG/100ML; MG/100ML; MG/100ML; MG/100ML
500 INJECTION, SOLUTION INTRAVENOUS CONTINUOUS
Status: DISCONTINUED | OUTPATIENT
Start: 2019-06-21 | End: 2019-06-26

## 2019-06-21 RX ORDER — KETOROLAC TROMETHAMINE 30 MG/ML
30 INJECTION, SOLUTION INTRAMUSCULAR; INTRAVENOUS EVERY 6 HOURS PRN
Status: ACTIVE | OUTPATIENT
Start: 2019-06-21 | End: 2019-06-26

## 2019-06-21 RX ADMIN — KETOROLAC TROMETHAMINE 30 MG: 30 INJECTION, SOLUTION INTRAMUSCULAR at 06:39

## 2019-06-21 RX ADMIN — VORTIOXETINE 20 MG: 20 TABLET, FILM COATED ORAL at 07:45

## 2019-06-21 RX ADMIN — ACETAMINOPHEN 650 MG: 325 TABLET, FILM COATED ORAL at 07:45

## 2019-06-21 RX ADMIN — ARIPIPRAZOLE 10 MG: 10 TABLET ORAL at 07:45

## 2019-06-21 RX ADMIN — LIDOCAINE HYDROCHLORIDE 1 ML: 10 INJECTION, SOLUTION EPIDURAL; INFILTRATION; INTRACAUDAL; PERINEURAL at 06:37

## 2019-06-21 RX ADMIN — SODIUM CHLORIDE, POTASSIUM CHLORIDE, SODIUM LACTATE AND CALCIUM CHLORIDE 500 ML: 600; 310; 30; 20 INJECTION, SOLUTION INTRAVENOUS at 06:36

## 2019-06-21 RX ADMIN — METHOHEXITAL SODIUM 120 MG: 500 INJECTION, POWDER, LYOPHILIZED, FOR SOLUTION INTRAMUSCULAR; INTRAVENOUS; RECTAL at 06:47

## 2019-06-21 RX ADMIN — SUCCINYLCHOLINE CHLORIDE 100 MG: 20 INJECTION, SOLUTION INTRAMUSCULAR; INTRAVENOUS; PARENTERAL at 06:47

## 2019-06-21 RX ADMIN — MIRTAZAPINE 30 MG: 30 TABLET, FILM COATED ORAL at 20:38

## 2019-06-21 RX ADMIN — TRAZODONE HYDROCHLORIDE 100 MG: 100 TABLET ORAL at 20:38

## 2019-06-21 ASSESSMENT — ACTIVITIES OF DAILY LIVING (ADL): HYGIENE/GROOMING: INDEPENDENT

## 2019-06-21 ASSESSMENT — LIFESTYLE VARIABLES: TOBACCO_USE: 0

## 2019-06-21 NOTE — PROCEDURES
[facility] ECT Procedure Note     Misty Parham [id number]   33 year old 1985     Patient Status: Inpatient    No Known Allergies    Weight:  229 lbs 8 oz              Diagnosis:   Major depression       Indications for ECT:   History of good ECT response in one or more previous episodes of illness       Pause for the Cause:     Right patient Yes   Right procedure/laterality settings: Yes   Right diagnosis Yes          Intra-Procedure Documentation:     Date:  6/21/2019  Time:  6:51 AM    ECT #    Treatment number this series: 1   Total treatment number: 1   Type of ECT:  Bilateral, standard    ECT Medications administered: See MAR and Anesthesia record         Clinical Narrative:     ECT was administered by Thymatron machine.  Pt has been medically cleared for procedure, consent signed. Side effects, Risks and benefits reviewed.    ECT Strip Summary:   Energy Level: 50 percent  Motor Seizure Duration: 30 seconds  EEG Seizure Duration: 30 seconds    Complications: No    Plan: 2nd ECT 6/24/19  Outpatient Psychiatrist: Drew Juarez NP

## 2019-06-21 NOTE — ANESTHESIA POSTPROCEDURE EVALUATION
Patient: Misty Parham    * No procedures listed *    Diagnosis:* No pre-op diagnosis entered *  Diagnosis Additional Information: No value filed.    Anesthesia Type:  General, RSI    Note:  Anesthesia Post Evaluation    Patient location during evaluation: PACU  Patient participation: Able to fully participate in evaluation  Level of consciousness: awake  Pain management: adequate  Airway patency: patent  Cardiovascular status: acceptable  Respiratory status: acceptable  Hydration status: acceptable  PONV: none     Anesthetic complications: None          Last vitals:  Vitals:    06/21/19 0551 06/21/19 0700 06/21/19 0705   BP: 117/70 105/73 109/73   Pulse: 95 78 70   Resp: 16 14 14   Temp: 36.7  C (98.1  F)     SpO2:  99% 100%         Electronically Signed By: Shanika Torres  June 21, 2019  7:14 AM

## 2019-06-21 NOTE — ANESTHESIA PREPROCEDURE EVALUATION
Anesthesia Pre-Procedure Evaluation    Patient: Misty Parham   MRN: [id number] : 1985          Preoperative Diagnosis: * No surgery found *        Past Medical History:   Diagnosis Date     Depressive disorder      No past surgical history on file.    Anesthesia Evaluation     . Pt has had prior anesthetic.     No history of anesthetic complications          ROS/MED HX    ENT/Pulmonary:      (-) tobacco use, asthma and sleep apnea   Neurologic:       Cardiovascular:         METS/Exercise Tolerance:     Hematologic:         Musculoskeletal:         GI/Hepatic:        (-) GERD   Renal/Genitourinary:         Endo:     (+) Obesity, .      Psychiatric:     (+) psychiatric history anxiety and depression      Infectious Disease:         Malignancy:         Other:                          Physical Exam  Normal systems: dental    Airway   Mallampati: II  TM distance: >3 FB  Neck ROM: full    Dental     Cardiovascular   Rhythm and rate: regular and normal      Pulmonary    breath sounds clear to auscultation            Lab Results   Component Value Date    WBC 8.4 2019    HGB 12.6 2019    HCT 38.7 2019     2019     2019    POTASSIUM 3.8 2019    CHLORIDE 109 2019    CO2 28 2019    BUN 10 2019    CR 0.86 2019    GLC 94 2019    CRISTINE 9.1 2019    ALBUMIN 3.7 2019    PROTTOTAL 7.8 2019    ALT 21 2019    AST 20 2019    ALKPHOS 76 2019    BILITOTAL 0.4 2019    TSH 1.18 2019    HCG Negative 2019       Preop Vitals  BP Readings from Last 3 Encounters:   19 117/70   19 100/79   18 107/70    Pulse Readings from Last 3 Encounters:   19 95   19 56   18 92      Resp Readings from Last 3 Encounters:   19 16   19 16   18 16    SpO2 Readings from Last 3 Encounters:   19 100%   19 99%   18 99%      Temp Readings from Last 1 Encounters:  "  06/21/19 36.7  C (98.1  F) (Oral)    Ht Readings from Last 1 Encounters:   06/20/19 1.651 m (5' 5\")      Wt Readings from Last 1 Encounters:   06/20/19 104.1 kg (229 lb 8 oz)    Estimated body mass index is 38.19 kg/m  as calculated from the following:    Height as of this encounter: 1.651 m (5' 5\").    Weight as of this encounter: 104.1 kg (229 lb 8 oz).       Anesthesia Plan      History & Physical Review  History and physical reviewed and following examination; no interval change.    ASA Status:  2 .        Plan for General and RSI with Intravenous induction.          Postoperative Care      Consents  Anesthetic plan, risks, benefits and alternatives discussed with:  Patient..                 Shanika Torres  "

## 2019-06-21 NOTE — PROGRESS NOTES
SPIRITUAL HEALTH SERVICES Progress Note  [facility]    Visited per request.     Pt reported she did not request to see a  and declined support at this time. SH oriented Pt with SH services and encouraged her to reach out when needing support as her hospital stay persists.      SH is available if needs arise.     Levy Valdivia  Chaplain Resident

## 2019-06-21 NOTE — PROGRESS NOTES
06/21/19 1400   General Information   Has Not Attended OT as of: 06/21/19     Pt has not attended OT since admit.  Will continue to encourage participation and completion of  self-assessment as able. OT staff will explain the purpose of being involved with treatment plan and provide options to meet current needs and goals.

## 2019-06-21 NOTE — PLAN OF CARE
Pt has been very isolative and withdrawn this evening; appears sad, has flat affect. Pt received Tylenol for headache, received decrease in pain. Pt is prepared to begin ECT in the morning; instructed to not eat or drink after midnight. Consent was signed earlier today.

## 2019-06-21 NOTE — PLAN OF CARE
Patient  has calm mood ,flat blunt affect,states she has fleeting suicidal thoughts but contracts for safety. Had jaw pain after ECT and received Tylenol but denies pain at 11:00. Pt went to mindfulness group in the afternoon but did not participate. Will encourage socialization and participation.

## 2019-06-22 PROCEDURE — 12400000 ZZH R&B MH

## 2019-06-22 PROCEDURE — 25000132 ZZH RX MED GY IP 250 OP 250 PS 637: Performed by: PSYCHIATRY & NEUROLOGY

## 2019-06-22 RX ADMIN — MIRTAZAPINE 30 MG: 30 TABLET, FILM COATED ORAL at 21:20

## 2019-06-22 RX ADMIN — ARIPIPRAZOLE 10 MG: 10 TABLET ORAL at 08:14

## 2019-06-22 RX ADMIN — VORTIOXETINE 20 MG: 20 TABLET, FILM COATED ORAL at 08:14

## 2019-06-22 RX ADMIN — TRAZODONE HYDROCHLORIDE 100 MG: 100 TABLET ORAL at 21:20

## 2019-06-22 NOTE — PLAN OF CARE
"Patient has a depressed mood and blunt affect. She states she has fleeting suicidal thoughts but contracts for safety. States, \"Theres not much I can do here.\"  She reports she is doing good today after ECT. Spent time out in the lounge watching tv, was withdrawn but social when prompted. Did not attend any groups.       "

## 2019-06-22 NOTE — PLAN OF CARE
Blunt, flat affect, appears depressed. Minimal responses to questions. Still endorses some fleeting suicidal thoughts, contracts for safety. Does report that ECT has helped her in the past. Visible out in lounge watching TV, minimal peer interaction observed. No groups. Eating and drinking appropriately. Med compliant.

## 2019-06-23 LAB — INTERPRETATION ECG - MUSE: NORMAL

## 2019-06-23 PROCEDURE — 25000132 ZZH RX MED GY IP 250 OP 250 PS 637: Performed by: PSYCHIATRY & NEUROLOGY

## 2019-06-23 PROCEDURE — 12400000 ZZH R&B MH

## 2019-06-23 RX ADMIN — ARIPIPRAZOLE 10 MG: 10 TABLET ORAL at 09:01

## 2019-06-23 RX ADMIN — MIRTAZAPINE 30 MG: 30 TABLET, FILM COATED ORAL at 21:10

## 2019-06-23 RX ADMIN — TRAZODONE HYDROCHLORIDE 100 MG: 100 TABLET ORAL at 21:09

## 2019-06-23 RX ADMIN — VORTIOXETINE 20 MG: 20 TABLET, FILM COATED ORAL at 09:01

## 2019-06-23 NOTE — PLAN OF CARE
"Flat affect, appear depressed. Pt reports mood is \"a bit better\" than yesterday. Pt visible during shift watching TV, attended 1st morning group. Minimal interaction with peers or staff, minimal responses to questions. Denies  self harm thoughts. ECT # 2 scheduled for tomorrow morning. Med compliant  "

## 2019-06-23 NOTE — PLAN OF CARE
Pt mood is depressed, with flat affect.  Pt spent time in her room, occasionally going to the lounge.  When around peers pt is minimally social.  Pt reported she feels better when she is out of her room. Behavior is withdrawn and isolative. Insight is appropriate for situation. Pt attended wrap-up group.  Pt endorsed fleeting suicidal thoughts, and reported she feels safe while in the hospital.

## 2019-06-24 ENCOUNTER — ANESTHESIA (OUTPATIENT)
Dept: SURGERY | Facility: CLINIC | Age: 34
End: 2019-06-24

## 2019-06-24 ENCOUNTER — ANESTHESIA EVENT (OUTPATIENT)
Dept: SURGERY | Facility: CLINIC | Age: 34
End: 2019-06-24

## 2019-06-24 PROCEDURE — 25000128 H RX IP 250 OP 636: Performed by: NURSE ANESTHETIST, CERTIFIED REGISTERED

## 2019-06-24 PROCEDURE — 25800030 ZZH RX IP 258 OP 636: Performed by: ANESTHESIOLOGY

## 2019-06-24 PROCEDURE — 90853 GROUP PSYCHOTHERAPY: CPT

## 2019-06-24 PROCEDURE — 25000125 ZZHC RX 250: Performed by: ANESTHESIOLOGY

## 2019-06-24 PROCEDURE — 25000128 H RX IP 250 OP 636: Performed by: PSYCHIATRY & NEUROLOGY

## 2019-06-24 PROCEDURE — 25000125 ZZHC RX 250: Performed by: NURSE ANESTHETIST, CERTIFIED REGISTERED

## 2019-06-24 PROCEDURE — 90870 ELECTROCONVULSIVE THERAPY: CPT

## 2019-06-24 PROCEDURE — 12400000 ZZH R&B MH

## 2019-06-24 PROCEDURE — 25000132 ZZH RX MED GY IP 250 OP 250 PS 637: Performed by: PSYCHIATRY & NEUROLOGY

## 2019-06-24 RX ORDER — KETOROLAC TROMETHAMINE 30 MG/ML
30 INJECTION, SOLUTION INTRAMUSCULAR; INTRAVENOUS EVERY 6 HOURS PRN
Status: DISCONTINUED | OUTPATIENT
Start: 2019-06-24 | End: 2019-06-24

## 2019-06-24 RX ADMIN — SODIUM CHLORIDE, POTASSIUM CHLORIDE, SODIUM LACTATE AND CALCIUM CHLORIDE 500 ML: 600; 310; 30; 20 INJECTION, SOLUTION INTRAVENOUS at 06:34

## 2019-06-24 RX ADMIN — TRAZODONE HYDROCHLORIDE 100 MG: 100 TABLET ORAL at 21:53

## 2019-06-24 RX ADMIN — ARIPIPRAZOLE 10 MG: 10 TABLET ORAL at 07:42

## 2019-06-24 RX ADMIN — VORTIOXETINE 20 MG: 20 TABLET, FILM COATED ORAL at 07:41

## 2019-06-24 RX ADMIN — KETOROLAC TROMETHAMINE 30 MG: 30 INJECTION, SOLUTION INTRAMUSCULAR at 06:35

## 2019-06-24 RX ADMIN — MIRTAZAPINE 30 MG: 30 TABLET, FILM COATED ORAL at 21:53

## 2019-06-24 RX ADMIN — METHOHEXITAL SODIUM 100 MG: 500 INJECTION, POWDER, LYOPHILIZED, FOR SOLUTION INTRAMUSCULAR; INTRAVENOUS; RECTAL at 06:51

## 2019-06-24 RX ADMIN — SUCCINYLCHOLINE CHLORIDE 100 MG: 20 INJECTION, SOLUTION INTRAMUSCULAR; INTRAVENOUS; PARENTERAL at 06:51

## 2019-06-24 RX ADMIN — LIDOCAINE HYDROCHLORIDE 0.5 ML: 10 INJECTION, SOLUTION EPIDURAL; INFILTRATION; INTRACAUDAL; PERINEURAL at 06:34

## 2019-06-24 ASSESSMENT — ACTIVITIES OF DAILY LIVING (ADL)
LAUNDRY: WITH SUPERVISION
ORAL_HYGIENE: INDEPENDENT
HYGIENE/GROOMING: INDEPENDENT
DRESS: INDEPENDENT

## 2019-06-24 ASSESSMENT — LIFESTYLE VARIABLES: TOBACCO_USE: 0

## 2019-06-24 NOTE — PLAN OF CARE
"Flat affect, depressed mood. Pt brightens upon approach. Pt visible during shift watching TV with peers and attended wrap up group. Minimal interaction with peers or staff, minimal responses to questions. Pt reported she is feeling \"slightly\" better than yesterday. Denied suicidal ideation. Hopeful ECT will be helpful  "

## 2019-06-24 NOTE — PLAN OF CARE
"  Problem: Depressive Symptoms  Goal: Depressive Symptoms  Description  Signs and symptoms of listed problems will be absent or manageable.  6/24/2019 1332 by Robert Shaver  Outcome: No Change  Flowsheets (Taken 6/24/2019 1328)  Depressive Symptoms Assessed: all  Depressive Symptoms Present: affect; mood; thought process; memory deficits  Note:   Pt presents with a flat affect and depressed mood. Pt had ECT #2 this morning, with only endorsing slight memory fogginess. Pt spent most of the shift participating in unit programming or resting in bed. Pt attended all unit programming but participated minimally. Pt was minimally social or responsive, but warmer upon approach. Pt reported feeling \"fine but tired.\" Pt denies Si.       "

## 2019-06-24 NOTE — PROGRESS NOTES
06/24/19 1500   General Information   Date Initially Attended OT 06/24/19   Clinical Impression   Affect Flat   Orientation Oriented to person, place and time   Appearance and ADLs General cleanliness observed in most areas   Attention to Internal Stimuli No observed signs   Interaction Skills Withdrawn;Interacts with prompts, minimal response   Ability to Communicate Needs Does so with prompts   Verbal Content Appropriate to topic   Ability to Maintain Boundaries Maintains appropriate physical boundaries;Maintains appropriate verbal boundaries   Participation Participates with frequent encouragement   Concentration Concentrates 5-10 minutes   Ability to Concentrate With structure   Follows and Comprehends Directions Independently follows multi-step directions   Memory Delayed and immediate recall intact   Organization Independently organizes medium tasks   Decision Making Needs further assessment   Planning and Problem Solving Needs further assessment   Ability to Apply and Learn Concepts Applies within group structure   Frustrations / Stress Tolerance Needs further assessment   Level of Insight Identifies needs with structure/support   Self Esteem Poor self esteem   Social Supports Has knowledge of support systems   General Observation/Plan   General Observations/Plan See Comments     INITIAL O.T. ASSESSMENT:      Pt transitioned to OT group with MIN encouragement. With VCs for initiation, pt participated in group meal prep task, which was utilized to promote and assess communication and socialization and increase role competence. Required direct VCs to initiate participation, however was cooperative and participated throughout group. Pt only spoke when spoken to directly. Pt will continue to benefit from OT intervention to address implementation of positive functional coping skills, role performance, and community reintegration.      OT assessment completed by semi-structured interview and direct observation.  Cited depression as the reason for current hospitalization. Goals ID'd include to ID and implement new coping skills, to increase motivation, and to be more assertive. OT staff explained the purpose of pt being involved in current treatment plan.      Plan: Encourage ongoing attendance as able to meet self-stated goals, implement positive coping skills, engage in therapeutic activities, and provide assessment ongoing.

## 2019-06-24 NOTE — ANESTHESIA PREPROCEDURE EVALUATION
Anesthesia Pre-Procedure Evaluation    Patient: Misty Parham   MRN: [id number] : 1985          Preoperative Diagnosis: * No surgery found *        Past Medical History:   Diagnosis Date     Depressive disorder      No past surgical history on file.    Anesthesia Evaluation     . Pt has had prior anesthetic.     No history of anesthetic complications          ROS/MED HX    ENT/Pulmonary:      (-) tobacco use, asthma and sleep apnea   Neurologic:       Cardiovascular:         METS/Exercise Tolerance:     Hematologic:         Musculoskeletal:         GI/Hepatic:        (-) GERD   Renal/Genitourinary:         Endo:     (+) Obesity, .      Psychiatric:     (+) psychiatric history anxiety and depression      Infectious Disease:         Malignancy:         Other:                            Physical Exam  Normal systems: dental    Airway   Mallampati: II  TM distance: >3 FB  Neck ROM: full    Dental     Cardiovascular   Rhythm and rate: regular and normal      Pulmonary    breath sounds clear to auscultation            Lab Results   Component Value Date    WBC 8.4 2019    HGB 12.6 2019    HCT 38.7 2019     2019     2019    POTASSIUM 3.8 2019    CHLORIDE 109 2019    CO2 28 2019    BUN 10 2019    CR 0.86 2019    GLC 94 2019    CRISTINE 9.1 2019    ALBUMIN 3.7 2019    PROTTOTAL 7.8 2019    ALT 21 2019    AST 20 2019    ALKPHOS 76 2019    BILITOTAL 0.4 2019    TSH 1.18 2019    HCG Negative 2019       Preop Vitals  BP Readings from Last 3 Encounters:   19 114/70   19 100/79   18 107/70    Pulse Readings from Last 3 Encounters:   19 85   19 56   18 92      Resp Readings from Last 3 Encounters:   19 16   19 16   18 16    SpO2 Readings from Last 3 Encounters:   19 99%   19 99%   18 99%      Temp Readings from Last 1  "Encounters:   06/24/19 36.9  C (98.5  F) (Oral)    Ht Readings from Last 1 Encounters:   06/20/19 1.651 m (5' 5\")      Wt Readings from Last 1 Encounters:   06/20/19 104.1 kg (229 lb 8 oz)    Estimated body mass index is 38.19 kg/m  as calculated from the following:    Height as of 6/20/19: 1.651 m (5' 5\").    Weight as of 6/20/19: 104.1 kg (229 lb 8 oz).       Anesthesia Plan      History & Physical Review  History and physical reviewed and following examination; no interval change.    ASA Status:  2 .    NPO Status:  > 8 hours    Plan for General (mask GA) with Intravenous induction.          Postoperative Care      Consents  Anesthetic plan, risks, benefits and alternatives discussed with:  Patient..                   Dilan Saez MD  "

## 2019-06-24 NOTE — ANESTHESIA POSTPROCEDURE EVALUATION
Patient: Misty Parham    * No procedures listed *    Diagnosis:* No pre-op diagnosis entered *  Diagnosis Additional Information: No value filed.    Anesthesia Type:  General    Note:  Anesthesia Post Evaluation    Patient location during evaluation: PACU  Patient participation: Able to fully participate in evaluation  Level of consciousness: awake and alert  Pain management: adequate  Airway patency: patent  Cardiovascular status: acceptable and hemodynamically stable  Respiratory status: acceptable  Hydration status: euvolemic  PONV: none     Anesthetic complications: None          Last vitals:  Vitals:    06/24/19 0720 06/24/19 0725 06/24/19 0730   BP: 112/66 111/64 109/73   Pulse: 75 75 84   Resp: 17 18 15   Temp:  37.3  C (99.2  F)    SpO2: 99% 97% 97%         Electronically Signed By: Dilan Saez MD  June 24, 2019  8:07 AM

## 2019-06-24 NOTE — ANESTHESIA CARE TRANSFER NOTE
Patient: Misty Parham    * No procedures listed *    Diagnosis: * No pre-op diagnosis entered *  Diagnosis Additional Information: No value filed.    Anesthesia Type:   General     Note:  Airway :Nasal Cannula  Patient transferred to:PACU  Comments: Native airway general anesthetic.  Patient hyperventilated with 100% oxygen via mask prior to treatment.   Anesthesia induced using patent peripheral IV.    Bite block placed between molars to protect teeth and tongue.     After induction of seizure patient mask ventilated with 100% oxygen until spontaneous respirations returned.     At time of handoff to PACU, patient exhibited spontaneous respirations, adequate tidal volumes, airway patent. Oxygen via nasal cannula at 2 liters per minute to PACU in cart with siderails up, connected to wall O2 in PACU. All monitors and alarms on and functioning. Report given to PACU RN and questions answered. Handoff Report: Identifed the Patient, Identified the Reponsible Provider, Reviewed the pertinent medical history, Discussed the surgical course, Reviewed Intra-OP anesthesia mangement and issues during anesthesia, Set expectations for post-procedure period and Allowed opportunity for questions and acknowledgement of understanding      Vitals: (Last set prior to Anesthesia Care Transfer)    CRNA VITALS  6/24/2019 0629 - 6/24/2019 0659      6/24/2019             Pulse:  66    SpO2:  99 %    Resp Rate (set):  10                Electronically Signed By: WILLIAN Claudio CRNA  June 24, 2019  6:59 AM

## 2019-06-24 NOTE — PROGRESS NOTES
[facility] Psychiatric Progress Note       Interim History     The patient's care was discussed with the treatment team and chart notes were reviewed. Over the weekend the patient continued to appear depressed in mood however was able to acknowledge feeling somewhat improved since admission date (denying SI). Her insight has been deemed appropriate. Patient had also expressed to staff members that she is hopeful that ECT will be beneficial for her. Pt seen on 6/24/19 by Dr. Madrigal. Patient had underwent her second ECT session this morning, where there were no complications. She denies any major complications or side effects from this procedure. She will undergo her third session on 6/26/19. Aside from ECT, the patient denies any issues with her medications or overall care. Dr. Madrigal will not make any medication adjustments today, will instead continue to see how patient responds to current regimen along with ECT.      Hospital Course     This is a 33 year old  female with a previous psychiatric history that includes major depression, generalized anxiety, and borderline personality disorder. She obtains three previous inpatient mental health hospitalizations, her most recent being approximately 5 months ago here at [facility]. During that hospitalizations, the patient underwent 6 bilateral ECT sessions in which she responded well to and then went on to continue with maintenance ECT after discharge. She currently follows Drew Juarez NP through [facility] for psychiatric care. Patient does not attain any chemical dependency history. For this admission, the patient was directly presented to [facility] from [facility] after meeting with Drew Juarez NP on 6/20/19. She appeared severely depressed along with attaining suicidal ideation while seen at [facility]. Patient was deemed appropriate for inpatient level of care for stabilization and to undergo a  "series of 3 bilateral ECT sessions as inpatient. Patient underwent her first session on 6/21/19.      Medications     Current Facility-Administered Medications Ordered in Epic   Medication Dose Route Frequency Last Rate Last Dose     [Auto Hold] acetaminophen (TYLENOL) tablet 650 mg  650 mg Oral Q4H PRN   650 mg at 06/21/19 0745     [Auto Hold] ARIPiprazole (ABILIFY) tablet 10 mg  10 mg Oral Daily   10 mg at 06/23/19 0901     [Auto Hold] hydrOXYzine (ATARAX) tablet 25 mg  25 mg Oral Q4H PRN         [Auto Hold] ketorolac (TORADOL) injection 30 mg  30 mg Intravenous Q6H PRN   30 mg at 06/24/19 0635     lactated ringers infusion  500 mL Intravenous Continuous 25 mL/hr at 06/24/19 0634 500 mL at 06/24/19 0634     lidocaine 1 % 0.1-1 mL  0.1-1 mL Other Q1H PRN   0.5 mL at 06/24/19 0634     lidocaine patch in PLACE   Transdermal Q8H         [Auto Hold] mirtazapine (REMERON) tablet 30 mg  30 mg Oral At Bedtime   30 mg at 06/23/19 2110     [Auto Hold] traZODone (DESYREL) tablet 100 mg  100 mg Oral At Bedtime   100 mg at 06/23/19 2109     [Auto Hold] vortioxetine (TRINTELLIX/BRINTELLIX) tablet 20 mg  20 mg Oral Daily   20 mg at 06/23/19 0901     No current [facility]-ordered outpatient medications on file.         Allergies      No Known Allergies     Medical Review of Systems     /70   Pulse 85   Temp 98.5  F (36.9  C) (Oral)   Resp 16   Ht 1.651 m (5' 5\")   Wt 104.1 kg (229 lb 8 oz)   SpO2 99%   BMI 38.19 kg/m    Body mass index is 38.19 kg/m .  A 10-point review of systems was performed by Zhang Madrigal MD and is negative, no new findings.      Psychiatric Examination     Appearance Sitting in chair, dressed in normal clothing. Appears stated age.   Attitude Cooperative   Orientation Oriented to person, place, time   Eye Contact Fair   Speech Regular rate, rhythm, volume and tone   Language Normal   Psychomotor Behavior Normal   Mood Depressed   Affect Flat   Thought Process Goal-Oriented, Intact "   Associations Intact   Thought Content Patient is currently negative for suicidal ideation, negative for plan or intent, able to contract no self harm and identify barriers to suicide.  Negative for obsessions, compulsions or psychosis.     Fund of Knowledge Intact   Insight Intact   Judgement Intact   Attention Span & Concentration Normal   Recent & Remote Memory Intact   Gait Normal   Muscle Tone Intact        Labs     Labs reviewed.  No results found for this or any previous visit (from the past 24 hour(s)).     Impression     This is a 33 year old  female with a previous psychiatric history that includes major depression, generalized anxiety, and borderline personality disorder. According to nursing and PA staff members on [facility], the patient had been compliant with medications, pleasant, cooperative in overall care, minimally social with her peers, and attending group sessions over the weekend. She continued to appear depressed in mood, however she was able to express feeling more improved in comparison to admission date. Patient underwent her second ECT session this morning where there were no complications. She denied experiencing any major side effects from this form of treatment. Will continue with her third session on 6/26/19. No medication adjustments were made today, will continue with current regimen.      Diagnoses     1. Major depression, recurrent, severe, without psychotic features.  2. Generalized Anxiety Disorder  3. Traits of Borderline Personality Disorder      Plan     1. Explained side effects, benefits, and complications of medications to the patient, Pt gave verbal consent.  2. Medication changes: None   3. Discussed treatment plan with patient and team.  4. Projected length of stay: 2 weeks  5. ECT #3 on 6/26/19    Attestation:   Patient has been seen and evaluated by me, Zhang Madrigal MD.    Patient ID:  Name: Misty Parham    MRN: [id number]  Admission:  6/20/2019   YOB: 1985

## 2019-06-24 NOTE — PROCEDURES
[facility] ECT Procedure Note     Misty Parham [id number]   33 year old 1985     Patient Status: Inpatient    No Known Allergies    Weight:  229 lbs 8 oz              Diagnosis:   Major depression       Indications for ECT:   History of good ECT response in one or more previous episodes of illness       Pause for the Cause:     Right patient Yes   Right procedure/laterality settings: Yes   Right diagnosis Yes          Intra-Procedure Documentation:     Date:  6/24/2019  Time:  6:51 AM    ECT #    Treatment number this series: 2   Total treatment number: 2   Type of ECT:  Bilateral, standard    ECT Medications administered: See MAR and Anesthesia record         Clinical Narrative:     ECT was administered by Thymatron machine.  Pt has been medically cleared for procedure, consent signed. Side effects, Risks and benefits reviewed.    ECT Strip Summary:   Energy Level: 60 percent  Motor Seizure Duration: 30 seconds  EEG Seizure Duration: 30 seconds    Complications: No    Plan: 3rd ECT 6/26/19  Outpatient Psychiatrist: Drew Juarez NP

## 2019-06-25 PROCEDURE — 12400000 ZZH R&B MH

## 2019-06-25 PROCEDURE — 90853 GROUP PSYCHOTHERAPY: CPT

## 2019-06-25 PROCEDURE — 25000132 ZZH RX MED GY IP 250 OP 250 PS 637: Performed by: PSYCHIATRY & NEUROLOGY

## 2019-06-25 RX ORDER — ONDANSETRON 4 MG/1
4 TABLET, FILM COATED ORAL 2 TIMES DAILY PRN
Status: DISCONTINUED | OUTPATIENT
Start: 2019-06-25 | End: 2019-07-03 | Stop reason: HOSPADM

## 2019-06-25 RX ADMIN — ARIPIPRAZOLE 10 MG: 10 TABLET ORAL at 09:02

## 2019-06-25 RX ADMIN — TRAZODONE HYDROCHLORIDE 100 MG: 100 TABLET ORAL at 21:54

## 2019-06-25 RX ADMIN — VORTIOXETINE 20 MG: 20 TABLET, FILM COATED ORAL at 09:02

## 2019-06-25 RX ADMIN — MIRTAZAPINE 30 MG: 30 TABLET, FILM COATED ORAL at 21:54

## 2019-06-25 ASSESSMENT — MIFFLIN-ST. JEOR: SCORE: 1731.44

## 2019-06-25 NOTE — PLAN OF CARE
Pt transitioned I'lly to OT group and engaged in therapeutic activity addressing functional cognition and interpersonal skills with MIN encouragement. With MIN encouragement and extended time, pt participated in therapeutic group activity and discussion addressing identifying and expressing emotions. Pt ID'd the happiest moment of her life as giving birth to her kids and a coping skill to use when feeling sad as watching a funny video. Pt will continue to benefit from OT intervention to address implementation of positive functional coping skills, role performance, and community reintegration.

## 2019-06-25 NOTE — PROGRESS NOTES
[facility] Psychiatric Progress Note       Interim History     The patient's care was discussed with the treatment team and chart notes were reviewed.  Pt seen on 6/25/19 by Dr. Madrigal. On interview, the patient notes she is feeling a little better today. Dr. Madrigal informs the patient that he had spoken to Drew Juarez NP (patients outpatient psychiatric care) yesterday where they discussed patients medications and ECT plan. Patient declares that she had stopped taking her Trintellix medication prior to admission because it was causing side effects such as upset stomach. Patient however has been taking this medication since being on [facility] [address] and has not had any issues. Dr. Madrigal will order and offer patient Zofran 4mg BID PRN to help with any future side effects. We will have nursing staff encourage the patient to utilize this medication. Aside from this, patient will continue with her third ECT session tomorrow morning as inpatient.      Hospital Course     This is a 33 year old  female with a previous psychiatric history that includes major depression, generalized anxiety, and borderline personality disorder. She obtains three previous inpatient mental health hospitalizations, her most recent being approximately 5 months ago here at [facility]. During that hospitalizations, the patient underwent 6 bilateral ECT sessions in which she responded well to and then went on to continue with maintenance ECT after discharge. She currently follows Drew Juarez NP through [facility] for psychiatric care. Patient does not attain any chemical dependency history. For this admission, the patient was directly presented to [facility] from [facility] after meeting with Drew Juarez NP on 6/20/19. She appeared severely depressed along with attaining suicidal ideation while seen at [facility] Patient was deemed appropriate for inpatient level of care for  stabilization and to undergo a series of 3 bilateral ECT sessions as inpatient. Patient underwent her first session on 6/21/19. According to nursing and PA staff members on [facility], the patient had been compliant with medications, pleasant, cooperative in overall care, minimally social with her peers, and attending group sessions over the weekend. She continued to appear depressed in mood, however she was able to express feeling more improved in comparison to admission date. Patient underwent her second ECT session this morning where there were no complications. She denied experiencing any major side effects from this form of treatment. Will continue with her third session on 6/26/19. No medication adjustments were made today, will continue with current regimen.      Medications     Current Facility-Administered Medications Ordered in Epic   Medication Dose Route Frequency Last Rate Last Dose     acetaminophen (TYLENOL) tablet 650 mg  650 mg Oral Q4H PRN   650 mg at 06/21/19 0745     ARIPiprazole (ABILIFY) tablet 10 mg  10 mg Oral Daily   10 mg at 06/24/19 0742     hydrOXYzine (ATARAX) tablet 25 mg  25 mg Oral Q4H PRN         ketorolac (TORADOL) injection 30 mg  30 mg Intravenous Q6H PRN   30 mg at 06/24/19 0635     lactated ringers infusion  500 mL Intravenous Continuous 25 mL/hr at 06/24/19 0634 500 mL at 06/24/19 0634     lidocaine 1 % 0.1-1 mL  0.1-1 mL Other Q1H PRN   0.5 mL at 06/24/19 0634     lidocaine patch in PLACE   Transdermal Q8H         mirtazapine (REMERON) tablet 30 mg  30 mg Oral At Bedtime   30 mg at 06/24/19 2153     traZODone (DESYREL) tablet 100 mg  100 mg Oral At Bedtime   100 mg at 06/24/19 2153     vortioxetine (TRINTELLIX/BRINTELLIX) tablet 20 mg  20 mg Oral Daily   20 mg at 06/24/19 0741     No current [facility]-ordered outpatient medications on file.         Allergies      No Known Allergies     Medical Review of Systems     /64   Pulse 82   Temp 97.7  F (36.5  C) (Oral)   Resp 16   " Ht 1.651 m (5' 5\")   Wt 103.6 kg (228 lb 4.8 oz)   SpO2 97%   BMI 37.99 kg/m    Body mass index is 37.99 kg/m .  A 10-point review of systems was performed by Zhang Madrigal MD and is negative, no new findings.      Psychiatric Examination     Appearance Sitting in chair, dressed in normal clothing. Appears stated age.   Attitude Cooperative   Orientation Oriented to person, place, time   Eye Contact Fair   Speech Regular rate, rhythm, volume and tone   Language Normal   Psychomotor Behavior Normal   Mood Depressed, less anxious    Affect Flat and quiet    Thought Process Goal-Oriented, Intact   Associations Intact   Thought Content Patient is currently negative for suicidal ideation, negative for plan or intent, able to contract no self harm and identify barriers to suicide.  Negative for obsessions, compulsions or psychosis.     Fund of Knowledge Intact   Insight Intact   Judgement Intact   Attention Span & Concentration Normal   Recent & Remote Memory Intact   Gait Normal   Muscle Tone Intact        Labs     Labs reviewed.  No results found for this or any previous visit (from the past 24 hour(s)).     Impression     This is a 33 year old  female with a previous psychiatric history that includes major depression, generalized anxiety, and borderline personality disorder. While meeting with Dr. Madrigal this morning, the patient was able to acknowledge feeling a bit better in overall mood. She denied any complications or concerns in regards of her current medication regimen, overall care, or ECT. Dr. Madrigal had spoken to Drew Juarez NP (patients outpatient psychiatric care provider) yesterday afternoon where they discussed patients medications and ECT plan. Apparently the patient had stopped taking her Trintellix medication prior to admission due to experiencing side effects such as upset stomach. However since being here on [facility], the patient has been taking this medication " without experiencing any side effects or issues. Dr. Madrigal has ordered and offered the patient Zofran 4mg BID PRN in so that she does not discontinue this medication on her own after this hosptialization. Will have nursing staff members encourage the patient to utilize this PRN.      Diagnoses     1. Major depression, recurrent, severe, without psychotic features.  2. Generalized Anxiety Disorder  3. Traits of Borderline Personality Disorder      Plan     1. Explained side effects, benefits, and complications of medications to the patient, Pt gave verbal consent.  2. Medication changes: Ordered Zofran 4mg BID PRN   3. Discussed treatment plan with patient and team.  4. Projected length of stay: 2 weeks  5. ECT #3 on 6/26/19    Attestation:   Patient has been seen and evaluated by me, Zhang Madrigal MD.    Patient ID:  Name: Misty Parham    MRN: [id number]  Admission: 6/20/2019   YOB: 1985

## 2019-06-25 NOTE — PLAN OF CARE
"Pt mood is calm but sad, with depressed affect. Thought process is poor. Behavior is isolative Insight is poor to situation. Pt denies suicidal ideation. Pt was given journal this shift by staff. Pt told staff during a 1:1 that she feels \"down\" this shift, pt was med compliant this shift. Pt did not shower, pt attend groups this shift.   "

## 2019-06-25 NOTE — PLAN OF CARE
Pt spent the majority of the shift in her room bed resting, she spent some time out in the lounge watching tv but was withdrawn. Pt reports she has not noted any difference with ECT and states she still feels the same as upon admission. Pt presents as flat in affect, depressed in mood and is withdrawn.

## 2019-06-26 ENCOUNTER — ANESTHESIA EVENT (OUTPATIENT)
Dept: SURGERY | Facility: CLINIC | Age: 34
End: 2019-06-26

## 2019-06-26 ENCOUNTER — ANESTHESIA (OUTPATIENT)
Dept: SURGERY | Facility: CLINIC | Age: 34
End: 2019-06-26

## 2019-06-26 PROCEDURE — 25000125 ZZHC RX 250: Performed by: NURSE ANESTHETIST, CERTIFIED REGISTERED

## 2019-06-26 PROCEDURE — 90870 ELECTROCONVULSIVE THERAPY: CPT

## 2019-06-26 PROCEDURE — 25000132 ZZH RX MED GY IP 250 OP 250 PS 637: Performed by: PSYCHIATRY & NEUROLOGY

## 2019-06-26 PROCEDURE — 12400000 ZZH R&B MH

## 2019-06-26 PROCEDURE — 25000128 H RX IP 250 OP 636: Performed by: NURSE ANESTHETIST, CERTIFIED REGISTERED

## 2019-06-26 PROCEDURE — 25800030 ZZH RX IP 258 OP 636: Performed by: ANESTHESIOLOGY

## 2019-06-26 PROCEDURE — 90853 GROUP PSYCHOTHERAPY: CPT

## 2019-06-26 RX ORDER — KETOROLAC TROMETHAMINE 30 MG/ML
30 INJECTION, SOLUTION INTRAMUSCULAR; INTRAVENOUS EVERY 6 HOURS PRN
Status: DISCONTINUED | OUTPATIENT
Start: 2019-06-26 | End: 2019-06-28

## 2019-06-26 RX ORDER — SODIUM CHLORIDE, SODIUM LACTATE, POTASSIUM CHLORIDE, CALCIUM CHLORIDE 600; 310; 30; 20 MG/100ML; MG/100ML; MG/100ML; MG/100ML
500 INJECTION, SOLUTION INTRAVENOUS CONTINUOUS
Status: DISCONTINUED | OUTPATIENT
Start: 2019-06-26 | End: 2019-06-28

## 2019-06-26 RX ORDER — KETOROLAC TROMETHAMINE 30 MG/ML
INJECTION, SOLUTION INTRAMUSCULAR; INTRAVENOUS PRN
Status: DISCONTINUED | OUTPATIENT
Start: 2019-06-26 | End: 2019-06-26

## 2019-06-26 RX ADMIN — TRAZODONE HYDROCHLORIDE 100 MG: 100 TABLET ORAL at 22:13

## 2019-06-26 RX ADMIN — ACETAMINOPHEN 650 MG: 325 TABLET, FILM COATED ORAL at 07:41

## 2019-06-26 RX ADMIN — METHOHEXITAL SODIUM 100 MG: 500 INJECTION, POWDER, LYOPHILIZED, FOR SOLUTION INTRAMUSCULAR; INTRAVENOUS; RECTAL at 06:37

## 2019-06-26 RX ADMIN — SUCCINYLCHOLINE CHLORIDE 100 MG: 20 INJECTION, SOLUTION INTRAMUSCULAR; INTRAVENOUS; PARENTERAL at 06:37

## 2019-06-26 RX ADMIN — MIRTAZAPINE 30 MG: 30 TABLET, FILM COATED ORAL at 22:13

## 2019-06-26 RX ADMIN — VORTIOXETINE 20 MG: 20 TABLET, FILM COATED ORAL at 07:36

## 2019-06-26 RX ADMIN — SODIUM CHLORIDE, SODIUM LACTATE, POTASSIUM CHLORIDE, CALCIUM CHLORIDE: 600; 310; 30; 20 INJECTION, SOLUTION INTRAVENOUS at 06:35

## 2019-06-26 RX ADMIN — ARIPIPRAZOLE 10 MG: 10 TABLET ORAL at 07:36

## 2019-06-26 RX ADMIN — KETOROLAC TROMETHAMINE 30 MG: 30 INJECTION, SOLUTION INTRAMUSCULAR at 06:40

## 2019-06-26 ASSESSMENT — ACTIVITIES OF DAILY LIVING (ADL)
LAUNDRY: WITH SUPERVISION
DRESS: INDEPENDENT
DRESS: STREET CLOTHES;INDEPENDENT
LAUNDRY: WITH SUPERVISION
ORAL_HYGIENE: INDEPENDENT
HYGIENE/GROOMING: INDEPENDENT
HYGIENE/GROOMING: INDEPENDENT
ORAL_HYGIENE: INDEPENDENT

## 2019-06-26 ASSESSMENT — LIFESTYLE VARIABLES: TOBACCO_USE: 0

## 2019-06-26 NOTE — ANESTHESIA CARE TRANSFER NOTE
Patient: Misty Parham    * No procedures listed *    Diagnosis: * No pre-op diagnosis entered *  Diagnosis Additional Information: No value filed.    Anesthesia Type:   General     Note:  Airway :Nasal Cannula  Patient transferred to:PACU  Comments: Pt exhibits spont resps, all monitors and alarms on in pacu, report given to RN, vss.Handoff Report: Identifed the Patient, Identified the Reponsible Provider, Reviewed the pertinent medical history, Discussed the surgical course, Reviewed Intra-OP anesthesia mangement and issues during anesthesia, Set expectations for post-procedure period and Allowed opportunity for questions and acknowledgement of understanding      Vitals: (Last set prior to Anesthesia Care Transfer)    CRNA VITALS  6/26/2019 0615 - 6/26/2019 0645      6/26/2019             Pulse:  73    SpO2:  100 %    Resp Rate (set):  10                Electronically Signed By: WILLIAN Ewing CRNA  June 26, 2019  6:45 AM

## 2019-06-26 NOTE — ANESTHESIA PREPROCEDURE EVALUATION
Anesthesia Pre-Procedure Evaluation    Patient: Misty Parham   MRN: [id number] : 1985          Preoperative Diagnosis: * No pre-op diagnosis entered *    * No procedures listed *    Past Medical History:   Diagnosis Date     Depressive disorder      No past surgical history on file.    Anesthesia Evaluation     . Pt has had prior anesthetic.     No history of anesthetic complications          ROS/MED HX    ENT/Pulmonary:      (-) tobacco use, asthma and sleep apnea   Neurologic:       Cardiovascular:         METS/Exercise Tolerance:     Hematologic:         Musculoskeletal:         GI/Hepatic:        (-) GERD   Renal/Genitourinary:         Endo:     (+) Obesity, .      Psychiatric:     (+) psychiatric history anxiety and depression      Infectious Disease:         Malignancy:         Other:                          Physical Exam  Normal systems: dental    Airway   Mallampati: II  TM distance: >3 FB  Neck ROM: full    Dental     Cardiovascular   Rhythm and rate: regular and normal      Pulmonary    breath sounds clear to auscultation            Lab Results   Component Value Date    WBC 8.4 2019    HGB 12.6 2019    HCT 38.7 2019     2019     2019    POTASSIUM 3.8 2019    CHLORIDE 109 2019    CO2 28 2019    BUN 10 2019    CR 0.86 2019    GLC 94 2019    CRISTINE 9.1 2019    ALBUMIN 3.7 2019    PROTTOTAL 7.8 2019    ALT 21 2019    AST 20 2019    ALKPHOS 76 2019    BILITOTAL 0.4 2019    TSH 1.18 2019    HCG Negative 2019       Preop Vitals  BP Readings from Last 3 Encounters:   19 113/72   19 100/79   18 107/70    Pulse Readings from Last 3 Encounters:   19 86   19 56   18 92      Resp Readings from Last 3 Encounters:   19 18   19 16   18 16    SpO2 Readings from Last 3 Encounters:   19 99%   19 99%   18 99%     "  Temp Readings from Last 1 Encounters:   06/26/19 36.3  C (97.4  F) (Oral)    Ht Readings from Last 1 Encounters:   06/20/19 1.651 m (5' 5\")      Wt Readings from Last 1 Encounters:   06/25/19 103.6 kg (228 lb 4.8 oz)    Estimated body mass index is 37.99 kg/m  as calculated from the following:    Height as of this encounter: 1.651 m (5' 5\").    Weight as of this encounter: 103.6 kg (228 lb 4.8 oz).       Anesthesia Plan      History & Physical Review  History and physical reviewed and following examination; no interval change.    ASA Status:  2 .        Plan for General with Other induction.          Postoperative Care      Consents  Anesthetic plan, risks, benefits and alternatives discussed with:  Patient..                 Kendall Hawk MD  "

## 2019-06-26 NOTE — PROCEDURES
[facility] ECT Procedure Note     Misty Parham [id number]   33 year old 1985     Patient Status: Inpatient    No Known Allergies    Weight:  228 lbs 4.8 oz              Diagnosis:   Major depression       Indications for ECT:   History of good ECT response in one or more previous episodes of illness       Pause for the Cause:     Right patient Yes   Right procedure/laterality settings: Yes   Right diagnosis Yes          Intra-Procedure Documentation:     Date:  6/26/2019  Time:  6:51 AM    ECT #    Treatment number this series: 3   Total treatment number: 3   Type of ECT:  Bilateral, standard    ECT Medications administered: See MAR and Anesthesia record         Clinical Narrative:     ECT was administered by Thymatron machine.  Pt has been medically cleared for procedure, consent signed. Side effects, Risks and benefits reviewed.    ECT Strip Summary:   Energy Level: 70 percent  Motor Seizure Duration: 30 seconds  EEG Seizure Duration: 30 seconds    Complications: No    Plan: 4th ECT 6/28/19  Outpatient Psychiatrist: Drew Juarez NP

## 2019-06-26 NOTE — ANESTHESIA CARE TRANSFER NOTE
Patient: Misty Parham    * No procedures listed *    Diagnosis: * No pre-op diagnosis entered *  Diagnosis Additional Information: No value filed.    Anesthesia Type:   General     Note:  Anesthesia Care Transfer Notewriter    Vitals: (Last set prior to Anesthesia Care Transfer)    CRNA VITALS  6/26/2019 0630 - 6/26/2019 0701      6/26/2019             Pulse:  70    SpO2:  100 %    Resp Rate (set):  10                Electronically Signed By: WILLIAN Ewing CRNA  June 26, 2019  7:01 AM

## 2019-06-26 NOTE — PROGRESS NOTES
[facility] Psychiatric Progress Note       Interim History     The patient's care was discussed with the treatment team and chart notes were reviewed. The patient proceeds to be quite isolative and guarded. Her mood has appeared to be calm, and her mood depressed according to nursing staff members. She proceeds to deny suicidal ideation. Pt seen on 6/26/19 by Dr. Madrigal. The patient underwent her third ECT session this morning where there were no complications. She denies any major side effects from the procedure, such as nausea, headache, or confusion. She will undergo her fourth session on 6/28/19 as inpatient. Patient denies any concerns with her medication regimen or overall care. No changes to medications will be made today.      Hospital Course     This is a 33 year old  female with a previous psychiatric history that includes major depression, generalized anxiety, and borderline personality disorder. She obtains three previous inpatient mental health hospitalizations, her most recent being approximately 5 months ago here at [facility]. During that hospitalizations, the patient underwent 6 bilateral ECT sessions in which she responded well to and then went on to continue with maintenance ECT after discharge. She currently follows Drew Juarez NP through [facility] for psychiatric care. Patient does not attain any chemical dependency history. For this admission, the patient was directly presented to [facility] from [facility] after meeting with Drew Juarez NP on 6/20/19. She appeared severely depressed along with attaining suicidal ideation while seen at [facility]. Patient was deemed appropriate for inpatient level of care for stabilization and to undergo a series of 3 bilateral ECT sessions as inpatient. Patient underwent her first session on 6/21/19. According to nursing and PA staff members on [facility], the patient had been compliant  with medications, pleasant, cooperative in overall care, minimally social with her peers, and attending group sessions over the weekend. She continued to appear depressed in mood, however she was able to express feeling more improved in comparison to admission date. Dr. Madrigal had spoken to Drew Juarez NP (patients outpatient psychiatric care provider) on 6/25 where they discussed patients medications and ECT plan. Apparently the patient had stopped taking her Trintellix medication prior to admission due to experiencing side effects such as upset stomach. However while here on [facility], the patient had been taking this medication without experiencing any side effects or issues. Dr. Madrigal had ordered and offered the patient Zofran 4mg BID PRN in so that she does not discontinue this medication on her own after this hosptialization. Will have nursing staff members encourage the patient to utilize this PRN.      Medications     Current Facility-Administered Medications Ordered in Epic   Medication Dose Route Frequency Last Rate Last Dose     [Auto Hold] acetaminophen (TYLENOL) tablet 650 mg  650 mg Oral Q4H PRN   650 mg at 06/21/19 0745     [Auto Hold] ARIPiprazole (ABILIFY) tablet 10 mg  10 mg Oral Daily   10 mg at 06/25/19 0902     [Auto Hold] hydrOXYzine (ATARAX) tablet 25 mg  25 mg Oral Q4H PRN         ketorolac (TORADOL) injection 30 mg  30 mg Intravenous Q6H PRN         lactated ringers infusion  500 mL Intravenous Continuous         lactated ringers infusion  500 mL Intravenous Continuous 25 mL/hr at 06/24/19 0634 500 mL at 06/24/19 0634     lidocaine 1 % 0.1-1 mL  0.1-1 mL Other Q1H PRN         lidocaine 1 % 0.1-1 mL  0.1-1 mL Other Q1H PRN   0.5 mL at 06/24/19 0634     lidocaine patch in PLACE   Transdermal Q8H         [Auto Hold] lidocaine patch in PLACE   Transdermal Q8H         [Auto Hold] mirtazapine (REMERON) tablet 30 mg  30 mg Oral At Bedtime   30 mg at 06/25/19 2154     [Auto Hold]  "ondansetron (ZOFRAN) tablet 4 mg  4 mg Oral BID PRN         sodium chloride (PF) 0.9% PF flush 3 mL  3 mL Intracatheter q1 min prn         sodium chloride (PF) 0.9% PF flush 3 mL  3 mL Intracatheter Q8H         [Auto Hold] traZODone (DESYREL) tablet 100 mg  100 mg Oral At Bedtime   100 mg at 06/25/19 2154     [Auto Hold] vortioxetine (TRINTELLIX/BRINTELLIX) tablet 20 mg  20 mg Oral Daily   20 mg at 06/25/19 0902     No current [facility]-ordered outpatient medications on file.         Allergies      No Known Allergies     Medical Review of Systems     /59   Pulse 74   Temp 97.4  F (36.3  C) (Oral)   Resp 18   Ht 1.651 m (5' 5\")   Wt 103.6 kg (228 lb 4.8 oz)   SpO2 100%   BMI 37.99 kg/m    Body mass index is 37.99 kg/m .  A 10-point review of systems was performed by Zhang Madrigal MD and is negative, no new findings.      Psychiatric Examination     Appearance Sitting in chair, dressed in normal clothing. Appears stated age.   Attitude Cooperative   Orientation Oriented to person, place, time   Eye Contact Fair   Speech Regular rate, rhythm, volume and tone   Language Normal   Psychomotor Behavior Normal   Mood Depressed, less anxious    Affect Flat and quiet    Thought Process Goal-Oriented, Intact   Associations Intact   Thought Content Patient is currently negative for suicidal ideation, negative for plan or intent, able to contract no self harm and identify barriers to suicide.  Negative for obsessions, compulsions or psychosis.     Fund of Knowledge Intact   Insight Intact   Judgement Intact   Attention Span & Concentration Normal   Recent & Remote Memory Intact   Gait Normal   Muscle Tone Intact        Labs     Labs reviewed.  No results found for this or any previous visit (from the past 24 hour(s)).     Impression     This is a 33 year old  female with a previous psychiatric history that includes major depression, generalized anxiety, and borderline personality disorder. According to " nursing and psych associate staff members on [facility], the patient continued to appear depressed in mood and calm in affect yesterday afternoon-evening. She has been compliant with medications, attending group sessions, and cooperative in overall care. She had underwent her third ECT session this morning, where there were no complications. Patient denied experiencing any major side effects from this form of treatment such as nausea, headaches, or confusion. She will continue with her fourth session on 6/28/19 as inpatient and complete the 6 series on 7/03/19. Aside from ECT, the patient very much the same as previous interviews. She denied any complications or issues with her current medication regimen or care here on [facility]. There were no medication modifications made today.      Diagnoses     1. Major depression, recurrent, severe, without psychotic features.  2. Generalized Anxiety Disorder  3. Traits of Borderline Personality Disorder      Plan     1. Explained side effects, benefits, and complications of medications to the patient, Pt gave verbal consent.  2. Medication changes: None   3. Discussed treatment plan with patient and team.  4. Projected length of stay: 2 weeks  5. ECT #4 on 6/28/19    Attestation:   Patient has been seen and evaluated by me, Zhang Madrigal MD.    Patient ID:  Name: Misty Parham    MRN: [id number]  Admission: 6/20/2019   YOB: 1985

## 2019-06-26 NOTE — ANESTHESIA POSTPROCEDURE EVALUATION
Patient: Misty Parham    * No procedures listed *    Diagnosis:* No pre-op diagnosis entered *  Diagnosis Additional Information: No value filed.    Anesthesia Type:  General    Note:  Anesthesia Post Evaluation    Patient location during evaluation: PACU  Patient participation: Able to fully participate in evaluation  Level of consciousness: awake  Pain management: adequate  Airway patency: patent  Cardiovascular status: acceptable  Respiratory status: acceptable  Hydration status: acceptable  PONV: none     Anesthetic complications: None          Last vitals:  Vitals:    06/26/19 0715 06/26/19 0720 06/26/19 0754   BP: 113/67 108/59 100/65   Pulse: 74  74   Resp: 10 18 16   Temp:   36.9  C (98.5  F)   SpO2: 100% 100% 99%         Electronically Signed By: Kendall Hawk MD  June 26, 2019  2:54 PM

## 2019-06-26 NOTE — PLAN OF CARE
Problem: Depressive Symptoms  Goal: Depressive Symptoms  Description  Signs and symptoms of listed problems will be absent or manageable.  Outcome: No Change  Flowsheets  Taken 6/24/2019 1328  Depressive Symptoms Assessed: all  Taken 6/26/2019 1412  Depressive Symptoms Present: affect;mood;memory deficits;other (see comment)  Note:   Pt presents with a blunt affect and depressed mood. Pt had her 3rd ECT this morning. Pt denied headache or nausea, but endorsed slight memory fogginess. Pt attended all unit programming but participated minimally. Pt still minimally social and responding with few word answers. Pt ate all of her food and was med compliant.

## 2019-06-26 NOTE — PLAN OF CARE
Patient presents with a flat affect and depressed mood, but brightens upon approach. Pt is soft spoken and guarded in speech. Pt worked on a puzzle in the lounge. Went to bed early. Did not attend any groups tonight.

## 2019-06-26 NOTE — PLAN OF CARE
Pt transitioned to PM mindfulness group I'lly. Pt participated in a mindfulness group focusing on reduction of anxiety, improvement of mood, and increase in concentration. The mindfulness practice was offered via supportive approaches including mindful movement and journaling. Pt will benefit from further identifying and  implementing of positive functional coping skills.

## 2019-06-27 PROCEDURE — 25000132 ZZH RX MED GY IP 250 OP 250 PS 637: Performed by: PSYCHIATRY & NEUROLOGY

## 2019-06-27 PROCEDURE — 12400000 ZZH R&B MH

## 2019-06-27 RX ADMIN — MIRTAZAPINE 30 MG: 30 TABLET, FILM COATED ORAL at 21:12

## 2019-06-27 RX ADMIN — VORTIOXETINE 20 MG: 20 TABLET, FILM COATED ORAL at 08:19

## 2019-06-27 RX ADMIN — TRAZODONE HYDROCHLORIDE 100 MG: 100 TABLET ORAL at 21:12

## 2019-06-27 RX ADMIN — ARIPIPRAZOLE 10 MG: 10 TABLET ORAL at 08:19

## 2019-06-27 ASSESSMENT — ACTIVITIES OF DAILY LIVING (ADL)
ORAL_HYGIENE: INDEPENDENT
DRESS: STREET CLOTHES
LAUNDRY: WITH SUPERVISION
HYGIENE/GROOMING: INDEPENDENT

## 2019-06-27 NOTE — PLAN OF CARE
Pt presents with depressed, sad mood, blunt affect spent most of the shift isolated and withdrawn to her room with tony minimal socialization. Pt express being very tired and denies SI nor hallucination. Significant other visited patient and it went well. Pt was med compliant. ECT #4 friday

## 2019-06-27 NOTE — PROGRESS NOTES
[facility] Psychiatric Progress Note       Interim History     The patient's care was discussed with the treatment team and chart notes were reviewed. Yesterday, the patient reported little to no side effects from ECT #3. She did however obtain little memory impairment. She attended all group sessions, has been medication compliant, pleasant, but proceeds to be minimally social with both her peers and staff members. Patient continues to deny suicidal ideation. Pt seen on 6/27/19 by Dr. Madrigal. On interview, the patient notes she is doing well this morning. Patient is still very few in words, but does deny any complications or issues with her medications or overall care while here on [facility] [address]. Dr. Madrigal informs the patient that he would like the patient to continue with her next 3 ECT sessions as inpatient. Patient very much in agreement with this plan since no one would be able to bring her to the outpatient sessions.      Hospital Course     This is a 33 year old  female with a previous psychiatric history that includes major depression, generalized anxiety, and borderline personality disorder. She obtains three previous inpatient mental health hospitalizations, her most recent being approximately 5 months ago here at [facility]. During that hospitalizations, the patient underwent 6 bilateral ECT sessions in which she responded well to and then went on to continue with maintenance ECT after discharge. She currently follows Drew Juarez NP through [facility] for psychiatric care. Patient does not attain any chemical dependency history. For this admission, the patient was directly presented to [facility] from [facility] after meeting with Drew Juarez NP on 6/20/19. She appeared severely depressed along with attaining suicidal ideation while seen at [facility]. Patient was deemed appropriate for inpatient level of care for stabilization and  to undergo a series of 3 bilateral ECT sessions as inpatient. Patient underwent her first session on 6/21/19. According to nursing and PA staff members on [facility], the patient had been compliant with medications, pleasant, cooperative in overall care, minimally social with her peers, and attending group sessions over the weekend. She continued to appear depressed in mood, however she was able to express feeling more improved in comparison to admission date. Dr. Madrigal had spoken to Drew Juarez NP (patients outpatient psychiatric care provider) on 6/25 where they discussed patients medications and ECT plan. Apparently the patient had stopped taking her Trintellix medication prior to admission due to experiencing side effects such as upset stomach. However while here on [facility], the patient had been taking this medication without experiencing any side effects or issues. Dr. Madrigal had ordered and offered the patient Zofran 4mg BID PRN in so that she does not discontinue this medication on her own after this hosptialization. Will have nursing staff members encourage the patient to utilize this PRN.      Medications     Current Facility-Administered Medications Ordered in Epic   Medication Dose Route Frequency Last Rate Last Dose     acetaminophen (TYLENOL) tablet 650 mg  650 mg Oral Q4H PRN   650 mg at 06/26/19 0741     ARIPiprazole (ABILIFY) tablet 10 mg  10 mg Oral Daily   10 mg at 06/27/19 0819     hydrOXYzine (ATARAX) tablet 25 mg  25 mg Oral Q4H PRN         ketorolac (TORADOL) injection 30 mg  30 mg Intravenous Q6H PRN         lactated ringers infusion  500 mL Intravenous Continuous         lidocaine 1 % 0.1-1 mL  0.1-1 mL Other Q1H PRN         lidocaine 1 % 0.1-1 mL  0.1-1 mL Other Q1H PRN   0.5 mL at 06/24/19 0634     lidocaine patch in PLACE   Transdermal Q8H         mirtazapine (REMERON) tablet 30 mg  30 mg Oral At Bedtime   30 mg at 06/26/19 1162     ondansetron (ZOFRAN) tablet 4 mg  4 mg  "Oral BID PRN         sodium chloride (PF) 0.9% PF flush 3 mL  3 mL Intracatheter q1 min prn         sodium chloride (PF) 0.9% PF flush 3 mL  3 mL Intracatheter Q8H         traZODone (DESYREL) tablet 100 mg  100 mg Oral At Bedtime   100 mg at 06/26/19 2213     vortioxetine (TRINTELLIX/BRINTELLIX) tablet 20 mg  20 mg Oral Daily   20 mg at 06/27/19 0819     No current [facility]-ordered outpatient medications on file.         Allergies      No Known Allergies     Medical Review of Systems     /65   Pulse 95   Temp 97.9  F (36.6  C) (Oral)   Resp 17   Ht 1.651 m (5' 5\")   Wt 103.6 kg (228 lb 4.8 oz)   SpO2 98%   BMI 37.99 kg/m    Body mass index is 37.99 kg/m .  A 10-point review of systems was performed by Zhang Madrigal MD and is negative, no new findings.      Psychiatric Examination     Appearance Sitting in chair, dressed in normal clothing. Appears stated age.   Attitude Cooperative   Orientation Oriented to person, place, time   Eye Contact Fair   Speech Regular rate, rhythm, volume and tone   Language Normal   Psychomotor Behavior Normal   Mood Less depressed   Affect Flat and very quiet    Thought Process Goal-Oriented, Intact   Associations Intact   Thought Content Patient is currently negative for suicidal ideation, negative for plan or intent, able to contract no self harm and identify barriers to suicide.  Negative for obsessions, compulsions or psychosis.     Fund of Knowledge Intact   Insight Intact   Judgement Intact   Attention Span & Concentration Normal   Recent & Remote Memory Intact   Gait Normal   Muscle Tone Intact        Labs     Labs reviewed.  No results found for this or any previous visit (from the past 24 hour(s)).     Impression     This is a 33 year old  female with a previous psychiatric history that includes major depression, generalized anxiety, and borderline personality disorder. Yesterday, from nursing and psych associate notes, the patient continued to be " pleasant, medication compliant, attending group sessions, and minimally social with her peers and staff members. She did admit to obtaining some mild memory impairment from ECT #3, however denied nausea or headaches. While meeting with Dr. Madrigal this afternoon, the patient proceeds to very guarded and little with her words. She was able to deny any side effects, concerns, or complications with her overall care or medication regimen. There were no medication adjustments made today. Patient also continues to be in agreement with undergoing her last three ECT sessions as inpatient rather than outpatient.      Diagnoses     1. Major depression, recurrent, severe, without psychotic features.  2. Generalized Anxiety Disorder  3. Traits of Borderline Personality Disorder      Plan     1. Explained side effects, benefits, and complications of medications to the patient, Pt gave verbal consent.  2. Medication changes: None   3. Discussed treatment plan with patient and team.  4. Projected length of stay: 2 weeks  5. ECT #4 on 6/28/19    Attestation:   Patient has been seen and evaluated by me, Zhang Madrigal MD.    Patient ID:  Name: Misty Parham    MRN: [id number]  Admission: 6/20/2019   YOB: 1985

## 2019-06-27 NOTE — PLAN OF CARE
Pt mood is depressed with blunt affect. Thought process is slow, insight is appropriate to situation. Pt attended groups, did not participate. Minimally social, clipped speech. Safety plan in place.

## 2019-06-28 ENCOUNTER — ANESTHESIA EVENT (OUTPATIENT)
Dept: SURGERY | Facility: CLINIC | Age: 34
End: 2019-06-28

## 2019-06-28 ENCOUNTER — ANESTHESIA (OUTPATIENT)
Dept: SURGERY | Facility: CLINIC | Age: 34
End: 2019-06-28

## 2019-06-28 PROCEDURE — 25800030 ZZH RX IP 258 OP 636: Performed by: ANESTHESIOLOGY

## 2019-06-28 PROCEDURE — 90853 GROUP PSYCHOTHERAPY: CPT

## 2019-06-28 PROCEDURE — 25000128 H RX IP 250 OP 636: Performed by: NURSE ANESTHETIST, CERTIFIED REGISTERED

## 2019-06-28 PROCEDURE — 25000128 H RX IP 250 OP 636: Performed by: PSYCHIATRY & NEUROLOGY

## 2019-06-28 PROCEDURE — 90870 ELECTROCONVULSIVE THERAPY: CPT

## 2019-06-28 PROCEDURE — 25000132 ZZH RX MED GY IP 250 OP 250 PS 637: Performed by: PSYCHIATRY & NEUROLOGY

## 2019-06-28 PROCEDURE — 12400000 ZZH R&B MH

## 2019-06-28 PROCEDURE — 25000125 ZZHC RX 250: Performed by: NURSE ANESTHETIST, CERTIFIED REGISTERED

## 2019-06-28 RX ORDER — SODIUM CHLORIDE, SODIUM LACTATE, POTASSIUM CHLORIDE, CALCIUM CHLORIDE 600; 310; 30; 20 MG/100ML; MG/100ML; MG/100ML; MG/100ML
500 INJECTION, SOLUTION INTRAVENOUS CONTINUOUS
Status: DISCONTINUED | OUTPATIENT
Start: 2019-06-28 | End: 2019-07-03

## 2019-06-28 RX ORDER — KETOROLAC TROMETHAMINE 30 MG/ML
30 INJECTION, SOLUTION INTRAMUSCULAR; INTRAVENOUS EVERY 6 HOURS PRN
Status: ACTIVE | OUTPATIENT
Start: 2019-06-28 | End: 2019-07-03

## 2019-06-28 RX ADMIN — METHOHEXITAL SODIUM 100 MG: 500 INJECTION, POWDER, LYOPHILIZED, FOR SOLUTION INTRAMUSCULAR; INTRAVENOUS; RECTAL at 07:37

## 2019-06-28 RX ADMIN — TRAZODONE HYDROCHLORIDE 100 MG: 100 TABLET ORAL at 20:12

## 2019-06-28 RX ADMIN — VORTIOXETINE 20 MG: 20 TABLET, FILM COATED ORAL at 08:29

## 2019-06-28 RX ADMIN — KETOROLAC TROMETHAMINE 30 MG: 30 INJECTION, SOLUTION INTRAMUSCULAR at 06:27

## 2019-06-28 RX ADMIN — SODIUM CHLORIDE, SODIUM LACTATE, POTASSIUM CHLORIDE, CALCIUM CHLORIDE: 600; 310; 30; 20 INJECTION, SOLUTION INTRAVENOUS at 07:19

## 2019-06-28 RX ADMIN — ARIPIPRAZOLE 10 MG: 10 TABLET ORAL at 08:29

## 2019-06-28 RX ADMIN — SODIUM CHLORIDE, SODIUM LACTATE, POTASSIUM CHLORIDE, CALCIUM CHLORIDE 500 ML: 600; 310; 30; 20 INJECTION, SOLUTION INTRAVENOUS at 06:17

## 2019-06-28 RX ADMIN — SUCCINYLCHOLINE CHLORIDE 100 MG: 20 INJECTION, SOLUTION INTRAMUSCULAR; INTRAVENOUS; PARENTERAL at 07:39

## 2019-06-28 RX ADMIN — MIRTAZAPINE 30 MG: 30 TABLET, FILM COATED ORAL at 20:13

## 2019-06-28 ASSESSMENT — LIFESTYLE VARIABLES: TOBACCO_USE: 0

## 2019-06-28 ASSESSMENT — ACTIVITIES OF DAILY LIVING (ADL)
DRESS: STREET CLOTHES
ORAL_HYGIENE: INDEPENDENT
HYGIENE/GROOMING: INDEPENDENT
ORAL_HYGIENE: INDEPENDENT
DRESS: SCRUBS (BEHAVIORAL HEALTH);INDEPENDENT
LAUNDRY: WITH SUPERVISION
LAUNDRY: WITH SUPERVISION
HYGIENE/GROOMING: INDEPENDENT

## 2019-06-28 NOTE — PLAN OF CARE
Pt mood is depressed, calm  with flat affect. Thought process is slow, insight is appropriate to situation. Pt did not attend groups but spent most of the shift in bed resting/napping. Minimally social, clipped speech. Pt has ECT #4 6/28. Pt denied any SI and was med compliant.

## 2019-06-28 NOTE — ANESTHESIA CARE TRANSFER NOTE
Patient: Misty Parham    * No procedures listed *    Diagnosis: * No pre-op diagnosis entered *  Diagnosis Additional Information: No value filed.    Anesthesia Type:   General, RSI     Note:  Airway :Nasal Cannula  Patient transferred to:PACU  Comments: Level of Conscious:Awake  Vital Signs   BP:126/87   HR:70   RR:16   O2 Saturation:100    Oxygen LPM:3  Dentition:Unchanged from preop  Patient Status:Stable  Report to PACU RN.Handoff Report: Identifed the Patient, Identified the Reponsible Provider, Reviewed the pertinent medical history, Discussed the surgical course, Reviewed Intra-OP anesthesia mangement and issues during anesthesia, Set expectations for post-procedure period and Allowed opportunity for questions and acknowledgement of understanding      Vitals: (Last set prior to Anesthesia Care Transfer)    CRNA VITALS  6/28/2019 0713 - 6/28/2019 0743      6/28/2019             Pulse:  77    SpO2:  100 %    Resp Rate (set):  10                Electronically Signed By: Christine Marie Volp Hodgkins, CRNA, APRN CRNA  June 28, 2019  7:43 AM

## 2019-06-28 NOTE — ANESTHESIA POSTPROCEDURE EVALUATION
Patient: Misty Parham    * No procedures listed *    Diagnosis:* No pre-op diagnosis entered *  Diagnosis Additional Information: No value filed.    Anesthesia Type:  General, RSI    Note:  Anesthesia Post Evaluation    Patient location during evaluation: PACU  Patient participation: Able to fully participate in evaluation  Level of consciousness: awake and alert  Pain management: adequate  Airway patency: patent  Cardiovascular status: hemodynamically stable, acceptable and blood pressure returned to baseline  Respiratory status: acceptable  Hydration status: acceptable  PONV: none     Anesthetic complications: None          Last vitals:  Vitals:    06/28/19 0815 06/28/19 0820 06/28/19 1539   BP: 115/86 127/78 106/68   Pulse: 80 89 86   Resp: 18 16 16   Temp:  37.1  C (98.8  F) 37.1  C (98.8  F)   SpO2: 99% 99% 97%         Electronically Signed By: Reina Cole MD  June 28, 2019  5:10 PM

## 2019-06-28 NOTE — PROGRESS NOTES
Pt recovered appropriately post ECT; VSS   0822hr - Pt now transferred back to floor in w/chair by CNA.

## 2019-06-28 NOTE — PROCEDURES
[facility] ECT Procedure Note     Misyt Parham [id number]   33 year old 1985     Patient Status: Inpatient    No Known Allergies    Weight:  228 lbs 4.8 oz              Diagnosis:   Major depression       Indications for ECT:   History of good ECT response in one or more previous episodes of illness       Pause for the Cause:     Right patient Yes   Right procedure/laterality settings: Yes   Right diagnosis Yes          Intra-Procedure Documentation:     Date:  6/26/2019  Time:  6:51 AM    ECT #    Treatment number this series: 4   Total treatment number: 6   Type of ECT:  Bilateral, standard    ECT Medications administered: See MAR and Anesthesia record         Clinical Narrative:     ECT was administered by Thymatron machine.  Pt has been medically cleared for procedure, consent signed. Side effects, Risks and benefits reviewed.    ECT Strip Summary:   Energy Level: 70 percent  Motor Seizure Duration: 32 seconds  EEG Seizure Duration: 32 seconds    Complications: No    Plan: 5th ECT 7/01/19  Outpatient Psychiatrist: Drew Juarez NP

## 2019-06-28 NOTE — ANESTHESIA PREPROCEDURE EVALUATION
Anesthesia Pre-Procedure Evaluation    Patient: Misty Parham   MRN: [id number] : 1985          Preoperative Diagnosis: * No pre-op diagnosis entered *    * No procedures listed *    Past Medical History:   Diagnosis Date     Depressive disorder      No past surgical history on file.    Anesthesia Evaluation     . Pt has had prior anesthetic.     No history of anesthetic complications          ROS/MED HX    ENT/Pulmonary:      (-) tobacco use, asthma and sleep apnea   Neurologic:       Cardiovascular:         METS/Exercise Tolerance:     Hematologic:         Musculoskeletal:         GI/Hepatic:        (-) GERD   Renal/Genitourinary:         Endo:     (+) Obesity, .      Psychiatric:     (+) psychiatric history depression      Infectious Disease:         Malignancy:         Other:                          Physical Exam  Normal systems: dental    Airway   Mallampati: II  TM distance: >3 FB  Neck ROM: full    Dental     Cardiovascular   Rhythm and rate: regular and normal      Pulmonary    breath sounds clear to auscultation            Lab Results   Component Value Date    WBC 8.4 2019    HGB 12.6 2019    HCT 38.7 2019     2019     2019    POTASSIUM 3.8 2019    CHLORIDE 109 2019    CO2 28 2019    BUN 10 2019    CR 0.86 2019    GLC 94 2019    CRISTINE 9.1 2019    ALBUMIN 3.7 2019    PROTTOTAL 7.8 2019    ALT 21 2019    AST 20 2019    ALKPHOS 76 2019    BILITOTAL 0.4 2019    TSH 1.18 2019    HCG Negative 2019       Preop Vitals  BP Readings from Last 3 Encounters:   19 102/70   19 100/79   18 107/70    Pulse Readings from Last 3 Encounters:   19 68   19 56   18 92      Resp Readings from Last 3 Encounters:   19 18   19 16   18 16    SpO2 Readings from Last 3 Encounters:   19 100%   19 99%   18 99%      Temp Readings  "from Last 1 Encounters:   06/28/19 36.4  C (97.5  F)    Ht Readings from Last 1 Encounters:   06/20/19 1.651 m (5' 5\")      Wt Readings from Last 1 Encounters:   06/25/19 103.6 kg (228 lb 4.8 oz)    Estimated body mass index is 37.99 kg/m  as calculated from the following:    Height as of this encounter: 1.651 m (5' 5\").    Weight as of this encounter: 103.6 kg (228 lb 4.8 oz).       Anesthesia Plan      History & Physical Review  History and physical reviewed and following examination; no interval change.    ASA Status:  2 .        Plan for General and RSI          Postoperative Care  Postoperative pain management:  IV analgesics.      Consents  Anesthetic plan, risks, benefits and alternatives discussed with:  Patient..                 Shanika Torres  "

## 2019-06-28 NOTE — PLAN OF CARE
Pt had ECT this morning. Presents as depressed, withdrawn, and has a flat and blunt affect. Visible around the unit, but tends to stay to himself. Attended focus group; pt participated minimally. During staff check-in pt reports feelings better today. Denies SI.

## 2019-06-29 PROCEDURE — 25000132 ZZH RX MED GY IP 250 OP 250 PS 637: Performed by: PSYCHIATRY & NEUROLOGY

## 2019-06-29 PROCEDURE — 12400000 ZZH R&B MH

## 2019-06-29 RX ADMIN — TRAZODONE HYDROCHLORIDE 100 MG: 100 TABLET ORAL at 21:08

## 2019-06-29 RX ADMIN — MIRTAZAPINE 30 MG: 30 TABLET, FILM COATED ORAL at 21:08

## 2019-06-29 RX ADMIN — ARIPIPRAZOLE 10 MG: 10 TABLET ORAL at 08:10

## 2019-06-29 RX ADMIN — VORTIOXETINE 20 MG: 20 TABLET, FILM COATED ORAL at 08:10

## 2019-06-29 ASSESSMENT — ACTIVITIES OF DAILY LIVING (ADL)
LAUNDRY: WITH SUPERVISION
DRESS: STREET CLOTHES
ORAL_HYGIENE: INDEPENDENT
LAUNDRY: WITH SUPERVISION
DRESS: STREET CLOTHES;INDEPENDENT
HYGIENE/GROOMING: INDEPENDENT
ORAL_HYGIENE: INDEPENDENT
LAUNDRY: WITH SUPERVISION
ORAL_HYGIENE: INDEPENDENT
HYGIENE/GROOMING: INDEPENDENT
HYGIENE/GROOMING: INDEPENDENT
DRESS: STREET CLOTHES;INDEPENDENT

## 2019-06-29 NOTE — PLAN OF CARE
Pt spent most of shift in [place] watching tv, but did not attend groups. She states her mood is still somewhat depressed and her anxiety is about the same. She does not feel things have improved much with her mood, but denies SI at this time. Insight is improving. Thought content is clear.

## 2019-06-29 NOTE — PLAN OF CARE
Patient remains quiet, flat in affect.  She denies any suicidal ideation & feels that the ECT is helping her depression.  Patient has been more visible on the unit while watching T.V. & attending wrap-up group.  She has been pleasant on approach & medication compliant.

## 2019-06-30 PROCEDURE — 12400000 ZZH R&B MH

## 2019-06-30 PROCEDURE — 25000132 ZZH RX MED GY IP 250 OP 250 PS 637: Performed by: PSYCHIATRY & NEUROLOGY

## 2019-06-30 RX ADMIN — MIRTAZAPINE 30 MG: 30 TABLET, FILM COATED ORAL at 22:00

## 2019-06-30 RX ADMIN — VORTIOXETINE 20 MG: 20 TABLET, FILM COATED ORAL at 08:17

## 2019-06-30 RX ADMIN — TRAZODONE HYDROCHLORIDE 100 MG: 100 TABLET ORAL at 22:00

## 2019-06-30 RX ADMIN — ARIPIPRAZOLE 10 MG: 10 TABLET ORAL at 08:17

## 2019-06-30 ASSESSMENT — ACTIVITIES OF DAILY LIVING (ADL)
LAUNDRY: WITH SUPERVISION
HYGIENE/GROOMING: INDEPENDENT
ORAL_HYGIENE: INDEPENDENT
DRESS: STREET CLOTHES
DRESS: STREET CLOTHES;INDEPENDENT
LAUNDRY: WITH SUPERVISION
ORAL_HYGIENE: INDEPENDENT
HYGIENE/GROOMING: INDEPENDENT

## 2019-06-30 NOTE — PLAN OF CARE
Patient remained flat in affect throughout the shift.  She states that she feels an improvement in mood since starting ECT.  Patient denies any suicidal ideation.  She worked on a puzzle with this writer with very minimal verbal interaction & attended only wrap up group.  Friends & family visited x several hours.

## 2019-06-30 NOTE — PLAN OF CARE
Pt mood is depressed, with blunt/flat affect. Thought process is poor. Behavior is guarded, does not interact with peers or staff.  Insight is poor. Pt has suicidal ideation, thoughts only, safety plan. Staff asked pt on a 1-1 what can she take away from this stay, pt was unable to give an answer. Pt appears thought blocking at times during the 1-1.

## 2019-07-01 ENCOUNTER — ANESTHESIA EVENT (OUTPATIENT)
Dept: SURGERY | Facility: CLINIC | Age: 34
End: 2019-07-01

## 2019-07-01 ENCOUNTER — ANESTHESIA (OUTPATIENT)
Dept: SURGERY | Facility: CLINIC | Age: 34
End: 2019-07-01

## 2019-07-01 PROCEDURE — 12400000 ZZH R&B MH

## 2019-07-01 PROCEDURE — 90870 ELECTROCONVULSIVE THERAPY: CPT

## 2019-07-01 PROCEDURE — 25000125 ZZHC RX 250: Performed by: NURSE ANESTHETIST, CERTIFIED REGISTERED

## 2019-07-01 PROCEDURE — 25000132 ZZH RX MED GY IP 250 OP 250 PS 637: Performed by: PSYCHIATRY & NEUROLOGY

## 2019-07-01 PROCEDURE — 90853 GROUP PSYCHOTHERAPY: CPT

## 2019-07-01 PROCEDURE — 25000128 H RX IP 250 OP 636: Performed by: NURSE ANESTHETIST, CERTIFIED REGISTERED

## 2019-07-01 PROCEDURE — 25000128 H RX IP 250 OP 636: Performed by: PSYCHIATRY & NEUROLOGY

## 2019-07-01 RX ORDER — KETOROLAC TROMETHAMINE 30 MG/ML
30 INJECTION, SOLUTION INTRAMUSCULAR; INTRAVENOUS EVERY 6 HOURS PRN
Status: DISCONTINUED | OUTPATIENT
Start: 2019-07-01 | End: 2019-07-01

## 2019-07-01 RX ADMIN — TRAZODONE HYDROCHLORIDE 100 MG: 100 TABLET ORAL at 20:50

## 2019-07-01 RX ADMIN — VORTIOXETINE 20 MG: 20 TABLET, FILM COATED ORAL at 09:09

## 2019-07-01 RX ADMIN — MIRTAZAPINE 30 MG: 30 TABLET, FILM COATED ORAL at 20:50

## 2019-07-01 RX ADMIN — SUCCINYLCHOLINE CHLORIDE 100 MG: 20 INJECTION, SOLUTION INTRAMUSCULAR; INTRAVENOUS; PARENTERAL at 06:35

## 2019-07-01 RX ADMIN — ARIPIPRAZOLE 10 MG: 10 TABLET ORAL at 09:09

## 2019-07-01 RX ADMIN — METHOHEXITAL SODIUM 100 MG: 500 INJECTION, POWDER, LYOPHILIZED, FOR SOLUTION INTRAMUSCULAR; INTRAVENOUS; RECTAL at 06:35

## 2019-07-01 RX ADMIN — KETOROLAC TROMETHAMINE 30 MG: 30 INJECTION, SOLUTION INTRAMUSCULAR at 06:26

## 2019-07-01 ASSESSMENT — ACTIVITIES OF DAILY LIVING (ADL)
HYGIENE/GROOMING: INDEPENDENT
DRESS: SCRUBS (BEHAVIORAL HEALTH);INDEPENDENT
ORAL_HYGIENE: INDEPENDENT
LAUNDRY: WITH SUPERVISION

## 2019-07-01 ASSESSMENT — LIFESTYLE VARIABLES: TOBACCO_USE: 0

## 2019-07-01 NOTE — PLAN OF CARE
Patient is noted to have some thought blocking when asked questions during her 1:1.  She does not engage in any conversations with staff or peers.  She does, however, feel that ECT is helping with her depression, even though she admits to having some thoughts of suicidal ideation.  Patient was withdrawn & isolative the entire shift.

## 2019-07-01 NOTE — ANESTHESIA POSTPROCEDURE EVALUATION
Patient: Misty Parham    * No procedures listed *    Diagnosis:* No pre-op diagnosis entered *  Diagnosis Additional Information: No value filed.    Anesthesia Type:  General    Note:  Anesthesia Post Evaluation    Patient location during evaluation: Bedside  Patient participation: Able to fully participate in evaluation  Level of consciousness: awake and alert  Pain management: adequate  Airway patency: patent  Cardiovascular status: acceptable  Respiratory status: acceptable  Hydration status: acceptable  PONV: none             Last vitals:  Vitals:    07/01/19 0705 07/01/19 0710 07/01/19 0715   BP: 117/60 110/75 117/78   Pulse: 78 81 79   Resp: 14 17 21   Temp:      SpO2: 98% 98% 95%         Electronically Signed By: Pedro Finley MD  July 1, 2019  7:20 AM

## 2019-07-01 NOTE — PLAN OF CARE
BEHAVIORAL TEAM DISCUSSION    Participants: MD, OT, RN, PA, CM  Progress: verbalizes improvement, but behavior still withdrawn and isolative  Continued Stay Criteria/Rationale: Continue ECT treatments for Depression  Medical/Physical: None  Precautions:   Behavioral Orders   Procedures    Code 1 - Restrict to Unit    Electroconvulsive therapy     Series of up to 6 treatments. Begin Date: 6/21/19     Treating Psychiatrist providing ECT:  Dr. Kwon and Dr. Madrigal      Notified on:  6/20/19    Electroconvulsive therapy     Series of up to 6 treatments. Begin Date: 6/21/19     Treating Psychiatrist providing ECT:  Dr. Rayo Madrigal      Notified on:  6/20/19    Electroconvulsive therapy     Series of up to 6 treatments. Begin Date: 6/21/19     Treating Psychiatrist providing ECT:  Dr. Rayo Madrigal      Notified on:  6/20/19    Electroconvulsive therapy     Series of up to 6 treatments. Begin Date: 6/21/19     Treating Psychiatrist providing ECT:  Dr. Rayo Madrigal      Notified on:  6/20/19    Electroconvulsive therapy     Series of up to 6 treatments. Begin Date: 6/21/19     Treating Psychiatrist providing ECT:  Dr. Rayo Madrigal      Notified on:  6/20/19    Routine Programming     As clinically indicated    Status 15     Every 15 minutes.     Plan: *Complete series of ECT Treatments  Rationale for change in precautions or plan: Complete full course of ECT Treatment for continued stabilization of depression

## 2019-07-01 NOTE — ANESTHESIA PREPROCEDURE EVALUATION
Anesthesia Pre-Procedure Evaluation    Patient: Misty Parham   MRN: [id number] : 1985          Preoperative Diagnosis: * No pre-op diagnosis entered *    * No procedures listed *    Past Medical History:   Diagnosis Date     Depressive disorder      No past surgical history on file.    Anesthesia Evaluation     . Pt has had prior anesthetic.     No history of anesthetic complications          ROS/MED HX    ENT/Pulmonary:      (-) tobacco use, asthma and sleep apnea   Neurologic:       Cardiovascular:         METS/Exercise Tolerance:     Hematologic:         Musculoskeletal:         GI/Hepatic:        (-) GERD   Renal/Genitourinary:         Endo: Comment: BMI 38    (+) Obesity, .      Psychiatric:     (+) psychiatric history depression      Infectious Disease:         Malignancy:         Other:                          Physical Exam  Normal systems: dental    Airway   Mallampati: III  TM distance: >3 FB  Neck ROM: full  Comment: Poor effort with mouth opening    Dental     Cardiovascular   Rhythm and rate: regular      Pulmonary    breath sounds clear to auscultation            Lab Results   Component Value Date    WBC 8.4 2019    HGB 12.6 2019    HCT 38.7 2019     2019     2019    POTASSIUM 3.8 2019    CHLORIDE 109 2019    CO2 28 2019    BUN 10 2019    CR 0.86 2019    GLC 94 2019    CRISTINE 9.1 2019    ALBUMIN 3.7 2019    PROTTOTAL 7.8 2019    ALT 21 2019    AST 20 2019    ALKPHOS 76 2019    BILITOTAL 0.4 2019    TSH 1.18 2019    HCG Negative 2019       Preop Vitals  BP Readings from Last 3 Encounters:   19 110/75   19 100/79   18 107/70    Pulse Readings from Last 3 Encounters:   19 81   19 56   18 92      Resp Readings from Last 3 Encounters:   19 17   19 16   18 16    SpO2 Readings from Last 3 Encounters:   19 98%  "  01/21/19 99%   03/27/18 99%      Temp Readings from Last 1 Encounters:   07/01/19 37.2  C (99  F) (Temporal)    Ht Readings from Last 1 Encounters:   06/20/19 1.651 m (5' 5\")      Wt Readings from Last 1 Encounters:   06/25/19 103.6 kg (228 lb 4.8 oz)    Estimated body mass index is 37.99 kg/m  as calculated from the following:    Height as of this encounter: 1.651 m (5' 5\").    Weight as of this encounter: 103.6 kg (228 lb 4.8 oz).       Anesthesia Plan      History & Physical Review  History and physical reviewed and following examination; no interval change.    ASA Status:  3 .    NPO Status:  > 8 hours    Plan for General (Mask ventilation) with Intravenous induction.   PONV prophylaxis:  Ondansetron (or other 5HT-3)       Postoperative Care      Consents  Anesthetic plan, risks, benefits and alternatives discussed with:  Patient..                 Pedro Finley MD  "

## 2019-07-01 NOTE — PROCEDURES
[facility] ECT Procedure Note     Misty Parham [id number]   33 year old 1985     Patient Status: Inpatient    No Known Allergies    Weight:  228 lbs 4.8 oz              Diagnosis:   Major depression       Indications for ECT:   History of good ECT response in one or more previous episodes of illness       Pause for the Cause:     Right patient Yes   Right procedure/laterality settings: Yes   Right diagnosis Yes          Intra-Procedure Documentation:     Date:  6/30/2019  Time:  6:51 AM    ECT #    Treatment number this series: 5   Total treatment number: 6   Type of ECT:  Bilateral, standard    ECT Medications administered: See MAR and Anesthesia record         Clinical Narrative:     ECT was administered by Thymatron machine.  Pt has been medically cleared for procedure, consent signed. Side effects, Risks and benefits reviewed.    ECT Strip Summary:   Energy Level: 90 percent  Motor Seizure Duration: 32 seconds  EEG Seizure Duration: 32 seconds    Complications: No    Plan: 6th ECT 7/03/19  Outpatient Psychiatrist: Drew Juarez NP

## 2019-07-02 PROCEDURE — 25000132 ZZH RX MED GY IP 250 OP 250 PS 637: Performed by: PSYCHIATRY & NEUROLOGY

## 2019-07-02 PROCEDURE — 90853 GROUP PSYCHOTHERAPY: CPT

## 2019-07-02 PROCEDURE — 12400000 ZZH R&B MH

## 2019-07-02 RX ADMIN — ARIPIPRAZOLE 10 MG: 10 TABLET ORAL at 08:25

## 2019-07-02 RX ADMIN — VORTIOXETINE 20 MG: 20 TABLET, FILM COATED ORAL at 08:25

## 2019-07-02 RX ADMIN — MIRTAZAPINE 30 MG: 30 TABLET, FILM COATED ORAL at 20:36

## 2019-07-02 RX ADMIN — TRAZODONE HYDROCHLORIDE 100 MG: 100 TABLET ORAL at 20:35

## 2019-07-02 ASSESSMENT — ACTIVITIES OF DAILY LIVING (ADL)
ORAL_HYGIENE: INDEPENDENT
HYGIENE/GROOMING: INDEPENDENT
HYGIENE/GROOMING: INDEPENDENT
ORAL_HYGIENE: INDEPENDENT
LAUNDRY: WITH SUPERVISION
DRESS: STREET CLOTHES
LAUNDRY: WITH SUPERVISION

## 2019-07-02 ASSESSMENT — MIFFLIN-ST. JEOR: SCORE: 1745.05

## 2019-07-02 NOTE — PROGRESS NOTES
[facility] Psychiatric Progress Note       Interim History     The patient's care was discussed with the treatment team and chart notes were reviewed. Yesterday, the patient reported little to no side effects from ECT #3. She did however obtain little memory impairment. She attended all group sessions, has been medication compliant, pleasant, but proceeds to be minimally social with both her peers and staff members. Patient continues to deny suicidal ideation. Pt seen on 7/02/19 by Dr. Madrigal. On interview, the patient notes she is doing well this morning. Patient is still very few in words, but does deny any complications or issues with her medications or overall care while here on [facility]. Dr. Madrigal informs the patient that he would like the patient to continue with her next 2 ECT sessions as inpatient. Patient very much in agreement with this plan since no one would be able to bring her to the outpatient sessions.      Hospital Course     This is a 33 year old  female with a previous psychiatric history that includes major depression, generalized anxiety, and borderline personality disorder. She obtains three previous inpatient mental health hospitalizations, her most recent being approximately 5 months ago here at [facility]. During that hospitalizations, the patient underwent 6 bilateral ECT sessions in which she responded well to and then went on to continue with maintenance ECT after discharge. She currently follows Drew Juarez NP through [facility] for psychiatric care. Patient does not attain any chemical dependency history. For this admission, the patient was directly presented to [facility] from [facility] after meeting with Drew Juarez NP on 6/20/19. She appeared severely depressed along with attaining suicidal ideation while seen at [facility]. Patient was deemed appropriate for inpatient level of care for stabilization and  to undergo a series of 3 bilateral ECT sessions as inpatient. Patient underwent her first session on 6/21/19. According to nursing and PA staff members on [facility], the patient had been compliant with medications, pleasant, cooperative in overall care, minimally social with her peers, and attending group sessions over the weekend. She continued to appear depressed in mood, however she was able to express feeling more improved in comparison to admission date. Dr. Madrigal had spoken to Drew Juarez NP (patients outpatient psychiatric care provider) on 6/25 where they discussed patients medications and ECT plan. Apparently the patient had stopped taking her Trintellix medication prior to admission due to experiencing side effects such as upset stomach. However while here on [facility], the patient had been taking this medication without experiencing any side effects or issues. Dr. Madrigal had ordered and offered the patient Zofran 4mg BID PRN in so that she does not discontinue this medication on her own after this hosptialization. Will have nursing staff members encourage the patient to utilize this PRN. The patient's 5th ECT was performed on 7/01/19, without complication.      Medications     Current Facility-Administered Medications Ordered in Epic   Medication Dose Route Frequency Last Rate Last Dose     acetaminophen (TYLENOL) tablet 650 mg  650 mg Oral Q4H PRN   650 mg at 06/26/19 0741     ARIPiprazole (ABILIFY) tablet 10 mg  10 mg Oral Daily   10 mg at 07/01/19 0909     hydrOXYzine (ATARAX) tablet 25 mg  25 mg Oral Q4H PRN         ketorolac (TORADOL) injection 30 mg  30 mg Intravenous Q6H PRN   30 mg at 07/01/19 0626     lactated ringers infusion  500 mL Intravenous Continuous         lidocaine 1 % 0.1-1 mL  0.1-1 mL Other Q1H PRN         mirtazapine (REMERON) tablet 30 mg  30 mg Oral At Bedtime   30 mg at 07/01/19 2050     ondansetron (ZOFRAN) tablet 4 mg  4 mg Oral BID PRN         sodium chloride  "(PF) 0.9% PF flush 3 mL  3 mL Intracatheter q1 min prn         traZODone (DESYREL) tablet 100 mg  100 mg Oral At Bedtime   100 mg at 07/01/19 2050     vortioxetine (TRINTELLIX/BRINTELLIX) tablet 20 mg  20 mg Oral Daily   20 mg at 07/01/19 0909     No current Epic-ordered outpatient medications on file.         Allergies      No Known Allergies     Medical Review of Systems     /75   Pulse 75   Temp 98  F (36.7  C) (Oral)   Resp 16   Ht 1.651 m (5' 5\")   Wt 103.6 kg (228 lb 4.8 oz)   SpO2 96%   BMI 37.99 kg/m    Body mass index is 37.99 kg/m .  A 10-point review of systems was performed by Zhang Madrigal MD and is negative, no new findings.      Psychiatric Examination     Appearance Sitting in chair, dressed in normal clothing. Appears stated age.   Attitude Cooperative   Orientation Oriented to person, place, time   Eye Contact Fair   Speech Regular rate, rhythm, volume and tone   Language Normal   Psychomotor Behavior Normal   Mood Less depressed   Affect Flat and very quiet    Thought Process Goal-Oriented, Intact   Associations Intact   Thought Content Patient is currently negative for suicidal ideation, negative for plan or intent, able to contract no self harm and identify barriers to suicide.  Negative for obsessions, compulsions or psychosis.     Fund of Knowledge Intact   Insight Intact   Judgement Intact   Attention Span & Concentration Normal   Recent & Remote Memory Intact   Gait Normal   Muscle Tone Intact        Labs     Labs reviewed.  No results found for this or any previous visit (from the past 24 hour(s)).     Impression     This is a 33 year old  female with a previous psychiatric history that includes major depression, generalized anxiety, and borderline personality disorder. Yesterday, from nursing and psych associate notes, the patient continued to be pleasant, medication compliant, attending group sessions, and minimally social with her peers and staff members. She " did admit to obtaining some mild memory impairment from ECT #3, however denied nausea or headaches. While meeting with Dr. Madrigal this afternoon, the patient proceeds to very guarded and little with her words. She was able to deny any side effects, concerns, or complications with her overall care or medication regimen. There were no medication adjustments made today. Patient also continues to be in agreement with undergoing her last three ECT sessions as inpatient rather than outpatient.      Diagnoses     1. Major depression, recurrent, severe, without psychotic features.  2. Generalized Anxiety Disorder  3. Traits of Borderline Personality Disorder      Plan     1. Explained side effects, benefits, and complications of medications to the patient, Pt gave verbal consent.  2. Medication changes: None   3. Discussed treatment plan with patient and team.  4. Projected length of stay: 2 weeks  5. ECT #4 on 6/28/19    Attestation:   Patient has been seen and evaluated by me, Zhang Madrigal MD.    Patient ID:  Name: Misty Parham    MRN: [id number]  Admission: 6/20/2019   YOB: 1985

## 2019-07-02 NOTE — PLAN OF CARE
Patient remains isolative & withdrawn with minimal verbal interaction.  No thought blocking noted.  Patient attended OT group only & denies any suicidal ideation.  She states that she is looking forward to discharge after ECT#6 on Wednesday.

## 2019-07-02 NOTE — PROGRESS NOTES
[facility] Psychiatric Progress Note       Interim History     The patient's care was discussed with the treatment team and chart notes were reviewed. Yesterday, the patient reported little to no side effects from ECT #3. She did however obtain little memory impairment. She attended all group sessions, has been medication compliant, pleasant, but proceeds to be minimally social with both her peers and staff members. Patient continues to deny suicidal ideation. Pt seen on 7/01/19 by Dr. Madrigal. On interview, the patient notes she is doing well this morning. Patient is still very few in words, but does deny any complications or issues with her medications or overall care while here on Station [facility]. Dr. Madrigal informs the patient that he would like the patient to continue with her next 2 ECT sessions as inpatient. Patient very much in agreement with this plan since no one would be able to bring her to the outpatient sessions.      Hospital Course     This is a 33 year old  female with a previous psychiatric history that includes major depression, generalized anxiety, and borderline personality disorder. She obtains three previous inpatient mental health hospitalizations, her most recent being approximately 5 months ago here at [facility]. During that hospitalizations, the patient underwent 6 bilateral ECT sessions in which she responded well to and then went on to continue with maintenance ECT after discharge. She currently follows Drew Juarez NP through [facility] for psychiatric care. Patient does not attain any chemical dependency history. For this admission, the patient was directly presented to [facility] from [facility] after meeting with Drew Juarez NP on 6/20/19. She appeared severely depressed along with attaining suicidal ideation while seen at [facility]. Patient was deemed appropriate for inpatient level of care for stabilization and  to undergo a series of 3 bilateral ECT sessions as inpatient. Patient underwent her first session on 6/21/19. According to nursing and PA staff members on [facility], the patient had been compliant with medications, pleasant, cooperative in overall care, minimally social with her peers, and attending group sessions over the weekend. She continued to appear depressed in mood, however she was able to express feeling more improved in comparison to admission date. Dr. Madrigal had spoken to Drew Juarez NP (patients outpatient psychiatric care provider) on 6/25 where they discussed patients medications and ECT plan. Apparently the patient had stopped taking her Trintellix medication prior to admission due to experiencing side effects such as upset stomach. However while here on [facility], the patient had been taking this medication without experiencing any side effects or issues. Dr. Madrigal had ordered and offered the patient Zofran 4mg BID PRN in so that she does not discontinue this medication on her own after this hosptialization. Will have nursing staff members encourage the patient to utilize this PRN.      Medications     Current Facility-Administered Medications Ordered in Epic   Medication Dose Route Frequency Last Rate Last Dose     acetaminophen (TYLENOL) tablet 650 mg  650 mg Oral Q4H PRN   650 mg at 06/26/19 0741     ARIPiprazole (ABILIFY) tablet 10 mg  10 mg Oral Daily   10 mg at 07/01/19 0909     hydrOXYzine (ATARAX) tablet 25 mg  25 mg Oral Q4H PRN         ketorolac (TORADOL) injection 30 mg  30 mg Intravenous Q6H PRN   30 mg at 07/01/19 0626     lactated ringers infusion  500 mL Intravenous Continuous         lidocaine 1 % 0.1-1 mL  0.1-1 mL Other Q1H PRN         mirtazapine (REMERON) tablet 30 mg  30 mg Oral At Bedtime   30 mg at 07/01/19 2050     ondansetron (ZOFRAN) tablet 4 mg  4 mg Oral BID PRN         sodium chloride (PF) 0.9% PF flush 3 mL  3 mL Intracatheter q1 min prn         traZODone  "(DESYREL) tablet 100 mg  100 mg Oral At Bedtime   100 mg at 07/01/19 2050     vortioxetine (TRINTELLIX/BRINTELLIX) tablet 20 mg  20 mg Oral Daily   20 mg at 07/01/19 0909     No current [facility]-ordered outpatient medications on file.         Allergies      No Known Allergies     Medical Review of Systems     /75   Pulse 75   Temp 98  F (36.7  C) (Oral)   Resp 16   Ht 1.651 m (5' 5\")   Wt 103.6 kg (228 lb 4.8 oz)   SpO2 96%   BMI 37.99 kg/m    Body mass index is 37.99 kg/m .  A 10-point review of systems was performed by Zhang Madrigal MD and is negative, no new findings.      Psychiatric Examination     Appearance Sitting in chair, dressed in normal clothing. Appears stated age.   Attitude Cooperative   Orientation Oriented to person, place, time   Eye Contact Fair   Speech Regular rate, rhythm, volume and tone   Language Normal   Psychomotor Behavior Normal   Mood Less depressed   Affect Flat and very quiet    Thought Process Goal-Oriented, Intact   Associations Intact   Thought Content Patient is currently negative for suicidal ideation, negative for plan or intent, able to contract no self harm and identify barriers to suicide.  Negative for obsessions, compulsions or psychosis.     Fund of Knowledge Intact   Insight Intact   Judgement Intact   Attention Span & Concentration Normal   Recent & Remote Memory Intact   Gait Normal   Muscle Tone Intact        Labs     Labs reviewed.  No results found for this or any previous visit (from the past 24 hour(s)).     Impression     This is a 33 year old  female with a previous psychiatric history that includes major depression, generalized anxiety, and borderline personality disorder. Yesterday, from nursing and psych associate notes, the patient continued to be pleasant, medication compliant, attending group sessions, and minimally social with her peers and staff members. She did admit to obtaining some mild memory impairment from ECT #3, however " denied nausea or headaches. While meeting with Dr. Madrigal this afternoon, the patient proceeds to very guarded and little with her words. She was able to deny any side effects, concerns, or complications with her overall care or medication regimen. There were no medication adjustments made today. Patient also continues to be in agreement with undergoing her last three ECT sessions as inpatient rather than outpatient.      Diagnoses     1. Major depression, recurrent, severe, without psychotic features.  2. Generalized Anxiety Disorder  3. Traits of Borderline Personality Disorder      Plan     1. Explained side effects, benefits, and complications of medications to the patient, Pt gave verbal consent.  2. Medication changes: None   3. Discussed treatment plan with patient and team.  4. Projected length of stay: 2 weeks  5. ECT #4 on 6/28/19    Attestation:   Patient has been seen and evaluated by me, Zhang Madrigal MD.    Patient ID:  Name: Misty Parham    MRN: [id number]  Admission: 6/20/2019   YOB: 1985

## 2019-07-02 NOTE — PLAN OF CARE
Pt presents with a calm and blunt affect,. Pt more visible this shift. Attended all groups, responded minimally though.  During staff check-in pt reports feeling okay. Pt will have ECT #6 tomorrow morning. Possible discharge tomorrow. Denies SI.

## 2019-07-03 ENCOUNTER — ANESTHESIA (OUTPATIENT)
Dept: SURGERY | Facility: CLINIC | Age: 34
End: 2019-07-03

## 2019-07-03 ENCOUNTER — ANESTHESIA EVENT (OUTPATIENT)
Dept: SURGERY | Facility: CLINIC | Age: 34
End: 2019-07-03

## 2019-07-03 VITALS
OXYGEN SATURATION: 96 % | BODY MASS INDEX: 38.54 KG/M2 | HEART RATE: 79 BPM | HEIGHT: 65 IN | WEIGHT: 231.3 LBS | TEMPERATURE: 98.1 F | SYSTOLIC BLOOD PRESSURE: 134 MMHG | DIASTOLIC BLOOD PRESSURE: 86 MMHG | RESPIRATION RATE: 18 BRPM

## 2019-07-03 PROCEDURE — 25000125 ZZHC RX 250: Performed by: NURSE ANESTHETIST, CERTIFIED REGISTERED

## 2019-07-03 PROCEDURE — 25000128 H RX IP 250 OP 636: Performed by: NURSE ANESTHETIST, CERTIFIED REGISTERED

## 2019-07-03 PROCEDURE — 25800030 ZZH RX IP 258 OP 636: Performed by: ANESTHESIOLOGY

## 2019-07-03 PROCEDURE — 90870 ELECTROCONVULSIVE THERAPY: CPT

## 2019-07-03 PROCEDURE — 90853 GROUP PSYCHOTHERAPY: CPT

## 2019-07-03 PROCEDURE — 25000132 ZZH RX MED GY IP 250 OP 250 PS 637: Performed by: PSYCHIATRY & NEUROLOGY

## 2019-07-03 PROCEDURE — 25000128 H RX IP 250 OP 636: Performed by: PSYCHIATRY & NEUROLOGY

## 2019-07-03 RX ORDER — MIRTAZAPINE 30 MG/1
30 TABLET, FILM COATED ORAL AT BEDTIME
Qty: 30 TABLET | Refills: 0 | Status: SHIPPED | OUTPATIENT
Start: 2019-07-03 | End: 2019-09-12

## 2019-07-03 RX ORDER — TRAZODONE HYDROCHLORIDE 100 MG/1
100 TABLET ORAL AT BEDTIME
Qty: 30 TABLET | Refills: 0 | Status: SHIPPED | OUTPATIENT
Start: 2019-07-03 | End: 2019-09-12

## 2019-07-03 RX ORDER — KETOROLAC TROMETHAMINE 30 MG/ML
30 INJECTION, SOLUTION INTRAMUSCULAR; INTRAVENOUS EVERY 6 HOURS PRN
Status: DISCONTINUED | OUTPATIENT
Start: 2019-07-03 | End: 2019-07-03

## 2019-07-03 RX ORDER — KETOROLAC TROMETHAMINE 30 MG/ML
30 INJECTION, SOLUTION INTRAMUSCULAR; INTRAVENOUS EVERY 6 HOURS PRN
Status: CANCELLED
Start: 2019-07-03

## 2019-07-03 RX ORDER — SODIUM CHLORIDE, SODIUM LACTATE, POTASSIUM CHLORIDE, CALCIUM CHLORIDE 600; 310; 30; 20 MG/100ML; MG/100ML; MG/100ML; MG/100ML
500 INJECTION, SOLUTION INTRAVENOUS CONTINUOUS
Status: DISCONTINUED | OUTPATIENT
Start: 2019-07-03 | End: 2019-07-03 | Stop reason: HOSPADM

## 2019-07-03 RX ORDER — ARIPIPRAZOLE 10 MG/1
10 TABLET ORAL DAILY
Qty: 30 TABLET | Refills: 0 | Status: SHIPPED | OUTPATIENT
Start: 2019-07-03 | End: 2019-09-12

## 2019-07-03 RX ADMIN — SUCCINYLCHOLINE CHLORIDE 100 MG: 20 INJECTION, SOLUTION INTRAMUSCULAR; INTRAVENOUS; PARENTERAL at 07:19

## 2019-07-03 RX ADMIN — VORTIOXETINE 20 MG: 20 TABLET, FILM COATED ORAL at 08:24

## 2019-07-03 RX ADMIN — METHOHEXITAL SODIUM 100 MG: 500 INJECTION, POWDER, LYOPHILIZED, FOR SOLUTION INTRAMUSCULAR; INTRAVENOUS; RECTAL at 07:19

## 2019-07-03 RX ADMIN — ARIPIPRAZOLE 10 MG: 10 TABLET ORAL at 08:24

## 2019-07-03 RX ADMIN — KETOROLAC TROMETHAMINE 30 MG: 30 INJECTION, SOLUTION INTRAMUSCULAR at 07:14

## 2019-07-03 RX ADMIN — SODIUM CHLORIDE, POTASSIUM CHLORIDE, SODIUM LACTATE AND CALCIUM CHLORIDE 500 ML: 600; 310; 30; 20 INJECTION, SOLUTION INTRAVENOUS at 07:16

## 2019-07-03 ASSESSMENT — LIFESTYLE VARIABLES: TOBACCO_USE: 0

## 2019-07-03 NOTE — DISCHARGE INSTRUCTIONS
Behavioral Discharge Planning and Instructions    Summary:  Admitted for worsening depression.     Main Diagnosis:   Major Depression, recurrent, severe, without psychotic features; Generalized Anxiety Disorder; Traits of Borderline Personality Disorder     Major Treatments, Procedures and Findings:  Psychiatric assessment. ECT.     Symptoms to Report: Losing more sleep or sleeping too much, Mood getting worse or Thoughts of suicide    Lifestyle Adjustment:  Follow all treatment recommendations. Develop and follow safety plan. Your safety plan is your contract with yourself to keep you safe in a crisis. Be sure to use the resources and crisis numbers listed on your safety plan. Do not drive for at least one week following your last ECT treatment. If you have lingering memory issues or impaired judgment, do not drive until you have been cleared to do so by your physician.     Psychiatry Follow-up:     You have a follow up appointment for medication management with Drew GARCIA at [facility] in [place] on Monday, July 8, 2019 at 10:40 am.     [facility]   [address], [address]  [place]  [address]  Phone:  [phone] / Fax:  [phone]    You have a therapy appointment with your therapist, Eli Zamarripa, at [facility] in [place] on Tuesday, July 9, 2019 at 10:30 am.     [facility]  [address]  [place]  [address]  Phone:  [phone] / Fax:  [phone]    Resources:   Crisis Intervention: 814.960.4511 or 943-688-1346 (TTY: 800.811.3300).  Call anytime for help.  [employer] [place] [employer] (www.[place].org): 413.493.8289 or 961-719-7422.  National Suicide Prevention Line (www.mentalhealthmn.org): 586-645-GZWS (8377)  COPE (Crisis Services for Adults) in [facility]: 949.554.6513 (Available 24/7)    General Medication Instructions:   See your medication sheet(s) for instructions.   Take all medicines as  directed.  Make no changes unless your doctor suggests them.   Go to all your doctor visits.  Be sure to have all your required lab tests. This way, your medicines can be refilled on time.  Do not use any drugs not prescribed by your doctor.  Avoid alcohol.

## 2019-07-03 NOTE — PROGRESS NOTES
met with patient to have her sign release of information forms for [facility] and [facility]. Follow up appointments made for patient with both clinics.  went over the safety plan with patient and encouraged her to complete it. Patient denies any thoughts of suicide or self harm at this time.

## 2019-07-03 NOTE — PLAN OF CARE
Pt presented with a flat blunt affect and calm mood. Thought process is impaired. Behavior is appropriate. Insight is poor. Pt denied SI. Pt reported that she has been feeling anxious today but does not know why. Pt attended groups, ate her meal and was med compliant.

## 2019-07-03 NOTE — PROGRESS NOTES
[facility] Psychiatric Progress Note       Interim History     The patient's care was discussed with the treatment team and chart notes were reviewed. Yesterday, the patient reported little to no side effects from ECT #3. She did however obtain little memory impairment. She attended all group sessions, has been medication compliant, pleasant, but proceeds to be minimally social with both her peers and staff members. Patient continues to deny suicidal ideation. Pt seen on 7/03/19 by Dr. Madrigal. The patient underwent her 6th ECT session today as an inpatient. No complications. Patient's mood has improved dramatically, she is much more hopeful, optimistic, and animated. The patient is ready for discharge. Patient will follow up with Drew Juarez NP, at [facility] in 2 weeks.          Hospital Course     This is a 33 year old  female with a previous psychiatric history that includes major depression, generalized anxiety, and borderline personality disorder. She obtains three previous inpatient mental health hospitalizations, her most recent being approximately 5 months ago here at [facility]. During that hospitalizations, the patient underwent 6 bilateral ECT sessions in which she responded well to and then went on to continue with maintenance ECT after discharge. She currently follows Drew Juarez NP through [facility] for psychiatric care. Patient does not attain any chemical dependency history. For this admission, the patient was directly presented to [facility] from [facility] after meeting with Drew Juarez NP on 6/20/19. She appeared severely depressed along with attaining suicidal ideation while seen at [facility]. Patient was deemed appropriate for inpatient level of care for stabilization and to undergo a series of 3 bilateral ECT sessions as inpatient. Patient underwent her first session on 6/21/19. According to nursing and PA  staff members on [facility] [address], the patient had been compliant with medications, pleasant, cooperative in overall care, minimally social with her peers, and attending group sessions over the weekend. She continued to appear depressed in mood, however she was able to express feeling more improved in comparison to admission date. Dr. Madrigal had spoken to Drew Juarez NP (patients outpatient psychiatric care provider) on 6/25 where they discussed patients medications and ECT plan. Apparently the patient had stopped taking her Trintellix medication prior to admission due to experiencing side effects such as upset stomach. However while here on [facility] [address], the patient had been taking this medication without experiencing any side effects or issues. Dr. Madrigal had ordered and offered the patient Zofran 4mg BID PRN in so that she does not discontinue this medication on her own after this hosptialization. Will have nursing staff members encourage the patient to utilize this PRN. The patient's 5th ECT and 6th ECT were performed on 7/01/19 and 7/03/19 as an inpatient, without complication. The patient is ready for discharge and will follow up with Drew Juarez NP, at [facility] in two weeks.      Medications     Current Facility-Administered Medications Ordered in Epic   Medication Dose Route Frequency Last Rate Last Dose     acetaminophen (TYLENOL) tablet 650 mg  650 mg Oral Q4H PRN   650 mg at 06/26/19 0741     ARIPiprazole (ABILIFY) tablet 10 mg  10 mg Oral Daily   10 mg at 07/02/19 0825     hydrOXYzine (ATARAX) tablet 25 mg  25 mg Oral Q4H PRN         lactated ringers infusion  500 mL Intravenous Continuous 25 mL/hr at 07/03/19 0716 500 mL at 07/03/19 0716     lactated ringers infusion  500 mL Intravenous Continuous         lidocaine 1 % 0.1-1 mL  0.1-1 mL Other Q1H PRN         lidocaine 1 % 0.1-1 mL  0.1-1 mL Other Q1H PRN         mirtazapine (REMERON) tablet 30 mg  30 mg Oral At Bedtime   30 mg at  "07/02/19 2036     ondansetron (ZOFRAN) tablet 4 mg  4 mg Oral BID PRN         sodium chloride (PF) 0.9% PF flush 3 mL  3 mL Intracatheter Q8H         sodium chloride (PF) 0.9% PF flush 3 mL  3 mL Intracatheter q1 min prn         traZODone (DESYREL) tablet 100 mg  100 mg Oral At Bedtime   100 mg at 07/02/19 2035     vortioxetine (TRINTELLIX/BRINTELLIX) tablet 20 mg  20 mg Oral Daily   20 mg at 07/02/19 0825     Current Outpatient Medications Ordered in Epic   Medication     ARIPiprazole (ABILIFY) 10 MG tablet     mirtazapine (REMERON) 30 MG tablet     traZODone (DESYREL) 100 MG tablet     vortioxetine (TRINTELLIX/BRINTELLIX) 20 MG tablet         Allergies      No Known Allergies     Medical Review of Systems     /86   Pulse 79   Temp 98.1  F (36.7  C) (Oral)   Resp 18   Ht 1.651 m (5' 5\")   Wt 104.9 kg (231 lb 4.8 oz)   SpO2 96%   BMI 38.49 kg/m    Body mass index is 38.49 kg/m .  A 10-point review of systems was performed by Zhang Madrigal MD and is negative, no new findings.      Psychiatric Examination     Appearance Sitting in chair, dressed in normal clothing. Appears stated age.   Attitude Cooperative   Orientation Oriented to person, place, time   Eye Contact Fair   Speech Regular rate, rhythm, volume and tone   Language Normal   Psychomotor Behavior Normal   Mood Less depressed   Affect Flat and very quiet    Thought Process Goal-Oriented, Intact   Associations Intact   Thought Content Patient is currently negative for suicidal ideation, negative for plan or intent, able to contract no self harm and identify barriers to suicide.  Negative for obsessions, compulsions or psychosis.     Fund of Knowledge Intact   Insight Intact   Judgement Intact   Attention Span & Concentration Normal   Recent & Remote Memory Intact   Gait Normal   Muscle Tone Intact        Labs     Labs reviewed.  No results found for this or any previous visit (from the past 24 hour(s)).     Impression     This is a 33 " year old  female with a previous psychiatric history that includes major depression, generalized anxiety, and borderline personality disorder. The patient continues to respond positively toward her ECT regiment. She completed her 6th on 7/03/19. Mood continues to improve and the patient is ready for discharge.      Diagnoses     1. Major depression, recurrent, severe, without psychotic features.  2. Generalized Anxiety Disorder  3. Traits of Borderline Personality Disorder      Plan     1. Explained side effects, benefits, and complications of medications to the patient, Pt gave verbal consent.  2. Medication changes: None   3. Discussed treatment plan with patient and team.  4. Projected length of stay: Discharge today.   5. Continue ECT as an outpatient. Patient will follow up with Drew Juarez NP, at [facility] in two weeks.     Attestation:   Patient has been seen and evaluated by me, Zhang Madrigal MD.    Patient ID:  Name: Misty Parham    MRN: [id number]  Admission: 6/20/2019   YOB: 1985

## 2019-07-03 NOTE — ANESTHESIA POSTPROCEDURE EVALUATION
Patient: Misty Parham    * No procedures listed *    Diagnosis:* No pre-op diagnosis entered *  Diagnosis Additional Information: No value filed.    Anesthesia Type:  General    Note:  Anesthesia Post Evaluation    Patient location during evaluation: PACU  Patient participation: Able to fully participate in evaluation  Level of consciousness: awake and alert  Pain management: adequate  Airway patency: patent  Cardiovascular status: acceptable and stable  Respiratory status: acceptable, spontaneous ventilation, unassisted and nonlabored ventilation  Hydration status: acceptable  PONV: none             Last vitals:  Vitals:    07/03/19 0549 07/03/19 0608 07/03/19 0730   BP: 115/73 121/77 113/59   Pulse:      Resp: 16 14 15   Temp: 36.5  C (97.7  F)  36.8  C (98.2  F)   SpO2:  98% 97%         Electronically Signed By: Nilo Thomas MD  July 3, 2019  7:36 AM

## 2019-07-03 NOTE — ANESTHESIA PREPROCEDURE EVALUATION
Anesthesia Pre-Procedure Evaluation    Patient: Misty Parham   MRN: [id number] : 1985          Preoperative Diagnosis: * No pre-op diagnosis entered *    * No procedures listed *    Past Medical History:   Diagnosis Date     Depressive disorder      No past surgical history on file.    Anesthesia Evaluation     . Pt has had prior anesthetic.     No history of anesthetic complications          ROS/MED HX    ENT/Pulmonary:      (-) tobacco use, asthma and sleep apnea   Neurologic:       Cardiovascular:         METS/Exercise Tolerance:     Hematologic:         Musculoskeletal:         GI/Hepatic:        (-) GERD   Renal/Genitourinary:         Endo: Comment: BMI 38    (+) Obesity, .      Psychiatric:     (+) psychiatric history depression      Infectious Disease:         Malignancy:         Other:                            Physical Exam  Normal systems: dental    Airway   Mallampati: III  TM distance: >3 FB  Neck ROM: full  Comment: Poor effort with mouth opening    Dental     Cardiovascular   Rhythm and rate: regular      Pulmonary    breath sounds clear to auscultation            Lab Results   Component Value Date    WBC 8.4 2019    HGB 12.6 2019    HCT 38.7 2019     2019     2019    POTASSIUM 3.8 2019    CHLORIDE 109 2019    CO2 28 2019    BUN 10 2019    CR 0.86 2019    GLC 94 2019    CRISTINE 9.1 2019    ALBUMIN 3.7 2019    PROTTOTAL 7.8 2019    ALT 21 2019    AST 20 2019    ALKPHOS 76 2019    BILITOTAL 0.4 2019    TSH 1.18 2019    HCG Negative 2019       Preop Vitals  BP Readings from Last 3 Encounters:   19 121/77   19 100/79   18 107/70    Pulse Readings from Last 3 Encounters:   19 84   19 56   18 92      Resp Readings from Last 3 Encounters:   19 14   19 16   18 16    SpO2 Readings from Last 3 Encounters:   19 98%  "  01/21/19 99%   03/27/18 99%      Temp Readings from Last 1 Encounters:   07/03/19 36.5  C (97.7  F) (Oral)    Ht Readings from Last 1 Encounters:   06/20/19 1.651 m (5' 5\")      Wt Readings from Last 1 Encounters:   07/02/19 104.9 kg (231 lb 4.8 oz)    Estimated body mass index is 38.49 kg/m  as calculated from the following:    Height as of 6/20/19: 1.651 m (5' 5\").    Weight as of 7/2/19: 104.9 kg (231 lb 4.8 oz).       Anesthesia Plan      History & Physical Review  History and physical reviewed and following examination; no interval change.    ASA Status:  3 .    NPO Status:  > 8 hours    Plan for General (Mask ventilation) with Intravenous induction.   PONV prophylaxis:  Ondansetron (or other 5HT-3)       Postoperative Care      Consents  Anesthetic plan, risks, benefits and alternatives discussed with:  Patient..                   Luiza Lima MD  "

## 2019-07-03 NOTE — PROGRESS NOTES
" 7/02/19 1300   Art Therapy   Type of Intervention structured groups   Response participates with encouragement   Hours 1   Treatment Detail Art Therapy- safe place/ landscape of emotions     Problem  Major depression, recurrent, severe, without psychotic features.  Generalized Anxiety Disorder  Traits of Borderline Personality Disorder     Goal- cope, express, regulate emotions     Outcome- pt attended Art Therapy group . She was very flat and quiet but reported her mood 5-10 , 10 being best at start of group and 9 of 10 at end. She enjoyed doing a detailed pencil drawing. It was symmetrical two trees on each side of image. There was a sun and also there was she said a \" rug\" on the ground between the trees. Writer inquired if it was a blanket for a picture but she said she didn't know. She said being part of a group, helped her mood. She did not attend wrap- up group.                  "

## 2019-07-03 NOTE — PROGRESS NOTES
Pt tolerated procedure well. VSS. Transferred back to Station [address] per wheelchair accompanied by nursing assistant.

## 2019-07-03 NOTE — DISCHARGE SUMMARY
[facility] Psychiatric Discharge Summary      DATE OF ADMISSION: 6/20/2019     DATE OF DISCHARGE: 7/3/19    PRIMARY CARE PHYSICIAN: Monse Chen    IDENTIFICATION: For history, see dictation by Dr. Madrigal on 6/20/19. For physical summary, see dictation by Shashank Ji on 6/20/19.     HOSPITAL COURSE:     This is a 33 year old  female with a previous psychiatric history that includes major depression, generalized anxiety, and borderline personality disorder. She obtains three previous inpatient mental health hospitalizations, her most recent being approximately 5 months ago here at [facility]. During that hospitalizations, the patient underwent 6 bilateral ECT sessions in which she responded well to and then went on to continue with maintenance ECT after discharge. She currently follows Drew Juarez NP through [facility] for psychiatric care. Patient does not attain any chemical dependency history. For this admission, the patient was directly presented to [facility] from [facility] after meeting with Drew Juarez NP on 6/20/19. She appeared severely depressed along with attaining suicidal ideation while seen at [facility]. Patient was deemed appropriate for inpatient level of care for stabilization and to undergo a series of 3 bilateral ECT sessions as inpatient. Patient underwent her first session on 6/21/19. According to nursing and PA staff members on [facility] [address], the patient had been compliant with medications, pleasant, cooperative in overall care, minimally social with her peers, and attending group sessions over the weekend. She continued to appear depressed in mood, however she was able to express feeling more improved in comparison to admission date. Dr. Madrigal had spoken to Drew Juarez NP (patients outpatient psychiatric care provider) on 6/25 where they discussed patients medications and ECT plan. Apparently the patient had stopped  "taking her Trintellix medication prior to admission due to experiencing side effects such as upset stomach. However while here on [facility] [address], the patient had been taking this medication without experiencing any side effects or issues. Dr. Madrigal had ordered and offered the patient Zofran 4mg BID PRN in so that she does not discontinue this medication on her own after this hosptialization. Will have nursing staff members encourage the patient to utilize this PRN. The patient's 5th ECT and 6th ECT were performed on 7/01/19 and 7/03/19, without complication. Patient is ready for discharge.     DISCHARGE MENTAL STATUS EXAMINATION:    Attitude Cooperative   Orientation Oriented to person, place, time   Eye Contact Fair   Speech Regular rate, rhythm, volume and tone   Language Normal   Psychomotor Behavior Normal   Mood Less depressed   Affect Flat and very quiet    Thought Process Goal-Oriented, Intact   Associations Intact   Thought Content Patient is currently negative for suicidal ideation, negative for plan or intent, able to contract no self harm and identify barriers to suicide.  Negative for obsessions, compulsions or psychosis.     Fund of Knowledge Intact   Insight Intact   Judgement Intact   Attention Span & Concentration Normal   Recent & Remote Memory Intact   Gait Normal   Muscle Tone Intact       LABORATORY DATA:    Refer to hospitalist admission dictation.  No results found for this or any previous visit (from the past 24 hour(s)).     /68   Pulse 80   Temp 97.5  F (36.4  C)   Resp 14   Ht 1.651 m (5' 5\")   Wt 104.9 kg (231 lb 4.8 oz)   SpO2 97%   BMI 38.49 kg/m       DISCHARGE MEDICATIONS:      Review of your medicines      CONTINUE these medicines which have NOT CHANGED      Dose / Directions   ARIPiprazole 10 MG tablet  Commonly known as:  ABILIFY  Used for:  Severe episode of recurrent major depressive disorder, without psychotic features (H)      Dose:  10 mg  Take 1 tablet (10 mg) by " mouth daily  Quantity:  30 tablet  Refills:  0     hydrOXYzine 25 MG tablet  Commonly known as:  ATARAX      Dose:  25 mg  Take 25 mg by mouth 3 times daily as needed for anxiety  Refills:  0     mirtazapine 30 MG tablet  Commonly known as:  REMERON  Used for:  Severe episode of recurrent major depressive disorder, without psychotic features (H)      Dose:  30 mg  Take 1 tablet (30 mg) by mouth At Bedtime  Quantity:  30 tablet  Refills:  0     traZODone 100 MG tablet  Commonly known as:  DESYREL  Used for:  Severe episode of recurrent major depressive disorder, without psychotic features (H)      Dose:  100 mg  Take 1 tablet (100 mg) by mouth At Bedtime  Quantity:  30 tablet  Refills:  0     vortioxetine 20 MG tablet  Commonly known as:  TRINTELLIX/BRINTELLIX  Used for:  Severe episode of recurrent major depressive disorder, without psychotic features (H)      Dose:  20 mg  Take 1 tablet (20 mg) by mouth daily  Quantity:  30 tablet  Refills:  0           Where to get your medicines      These medications were sent to [facility] [place] [id number] [address]    Phone:  [phone]     ARIPiprazole 10 MG tablet    mirtazapine 30 MG tablet    traZODone 100 MG tablet    vortioxetine 20 MG tablet         DISCHARGE DIAGNOSES:    1. Major depression, recurrent, severe, without psychotic features.  2. Generalized Anxiety Disorder  3. Traits of Borderline Personality Disorder     DISCHARGE PLAN:     Continue the following medication: Abilify 10mg qam, Trintellix 20mg qam, Remeron 30mg qhs, and Trazodone 100mg qhs.     DISCHARGE FOLLOW-UP:    Patient will follow up with Drew Juarez NP, at [facility] and will continue to have monthly maintenance ECT's performed as an outpatient.    Attestation:   Patient has been seen and evaluated by me, Zhang Madrigal MD.    Patient ID:    Name: Misty Parham MRN: [id number]  Admission:  6/20/2019  YOB: 1985

## 2019-07-03 NOTE — ANESTHESIA CARE TRANSFER NOTE
Patient: Misty Parham    * No procedures listed *    Diagnosis: * No pre-op diagnosis entered *  Diagnosis Additional Information: No value filed.    Anesthesia Type:   General     Note:  Airway :Nasal Cannula  Patient transferred to:PACU  Comments: Native airway general anesthetic.  Patient hyperventilated with 100% oxygen via mask prior to treatment.   Anesthesia induced using patent peripheral IV.    Bite block placed between molars to protect teeth and tongue.     After induction of seizure patient mask ventilated with 100% oxygen until spontaneous respirations returned.     At time of handoff to PACU, patient exhibited spontaneous respirations, adequate tidal volumes, airway patent. Oxygen via nasal cannula at 4 liters per minute to PACU in cart with siderails up, connected to wall O2 in PACU. All monitors and alarms on and functioning. Report given to PACU RN and questions answered. Handoff Report: Identifed the Patient, Identified the Reponsible Provider, Reviewed the pertinent medical history, Discussed the surgical course, Reviewed Intra-OP anesthesia mangement and issues during anesthesia, Set expectations for post-procedure period and Allowed opportunity for questions and acknowledgement of understanding      Vitals: (Last set prior to Anesthesia Care Transfer)    CRNA VITALS  7/3/2019 0658 - 7/3/2019 0728      7/3/2019             Pulse:  68    SpO2:  100 %    Resp Rate (set):  10                Electronically Signed By: WILLIAN Gandara CRNA  July 3, 2019  7:28 AM

## 2019-07-03 NOTE — PROCEDURES
[facility] ECT Procedure Note     Misty Parham [id number]   33 year old 1985     Patient Status: Inpatient    No Known Allergies    Weight:  231 lbs 4.8 oz              Diagnosis:   Major depression       Indications for ECT:   History of good ECT response in one or more previous episodes of illness       Pause for the Cause:     Right patient Yes   Right procedure/laterality settings: Yes   Right diagnosis Yes          Intra-Procedure Documentation:     Date:  7/03/2019  Time:  7:15 AM    ECT #    Treatment number this series: 6   Total treatment number: 6   Type of ECT:  Bilateral, standard    ECT Medications administered: See MAR and Anesthesia record. Brevital 100mg, Succ. 100mg.          Clinical Narrative:     ECT was administered by Thymatron machine.  Pt has been medically cleared for procedure, consent signed. Side effects, Risks and benefits reviewed.    ECT Strip Summary:   Energy Level: 90 percent  Motor Seizure Duration: 28 seconds  EEG Seizure Duration: 28 seconds    Complications: No    Plan: Patient has completed ECT treatment course, 6/6.   Outpatient Psychiatrist: Drew Juarez NP  Plan outpatient ECT in one month.

## 2019-07-03 NOTE — PLAN OF CARE
Pt was visible in the milieu. States that the ECT treatments are slowly working. Denies SI, depression and anxiety. States that she is looking forward to discharging in morning after ECT. Participated in art therapy. Will continue to monitor for safety.

## 2019-07-03 NOTE — PROGRESS NOTES
Discharge teaching done with pt and pt's ex  Nagi Parham, as pt had ECT number 6 today. Pt denied SI. Pt verbalized understanding of medication and out pt f/u TX plan. Pt understands that she may not drive for one week after her last ECT tx . Medications filled here except the Trintellix, Pt's ex  agreed to  samples today at Dr. Carey office. Medications sent home with pt. PBelongings returned to pt at discharge. Pt discharged to home. Pt is currently living with her mother per pt's Ex-.

## 2019-09-12 ENCOUNTER — HOSPITAL ENCOUNTER (INPATIENT)
Facility: CLINIC | Age: 34
LOS: 8 days | Discharge: HOME | End: 2019-09-20
Attending: EMERGENCY MEDICINE | Admitting: PSYCHIATRY & NEUROLOGY
Payer: COMMERCIAL

## 2019-09-12 DIAGNOSIS — R45.851 SUICIDAL IDEATION: Primary | ICD-10-CM

## 2019-09-12 DIAGNOSIS — F32.A DEPRESSION WITH SUICIDAL IDEATION: ICD-10-CM

## 2019-09-12 DIAGNOSIS — F33.2 SEVERE EPISODE OF RECURRENT MAJOR DEPRESSIVE DISORDER, WITHOUT PSYCHOTIC FEATURES (H): ICD-10-CM

## 2019-09-12 DIAGNOSIS — R45.851 DEPRESSION WITH SUICIDAL IDEATION: ICD-10-CM

## 2019-09-12 LAB
AMPHETAMINES UR QL SCN: NEGATIVE
ANION GAP SERPL CALCULATED.3IONS-SCNC: 5 MMOL/L (ref 3–14)
BARBITURATES UR QL: NEGATIVE
BENZODIAZ UR QL: NEGATIVE
BUN SERPL-MCNC: 10 MG/DL (ref 7–30)
CALCIUM SERPL-MCNC: 9.1 MG/DL (ref 8.5–10.1)
CANNABINOIDS UR QL SCN: NEGATIVE
CHLORIDE SERPL-SCNC: 111 MMOL/L (ref 94–109)
CO2 SERPL-SCNC: 25 MMOL/L (ref 20–32)
COCAINE UR QL: NEGATIVE
CREAT SERPL-MCNC: 0.86 MG/DL (ref 0.52–1.04)
ERYTHROCYTE [DISTWIDTH] IN BLOOD BY AUTOMATED COUNT: 15 % (ref 10–15)
GFR SERPL CREATININE-BSD FRML MDRD: 87 ML/MIN/{1.73_M2}
GLUCOSE SERPL-MCNC: 101 MG/DL (ref 70–99)
HCG UR QL: NEGATIVE
HCT VFR BLD AUTO: 39.4 % (ref 35–47)
HGB BLD-MCNC: 12.7 G/DL (ref 11.7–15.7)
MCH RBC QN AUTO: 28.7 PG (ref 26.5–33)
MCHC RBC AUTO-ENTMCNC: 32.2 G/DL (ref 31.5–36.5)
MCV RBC AUTO: 89 FL (ref 78–100)
OPIATES UR QL SCN: NEGATIVE
PCP UR QL SCN: NEGATIVE
PLATELET # BLD AUTO: 306 10E9/L (ref 150–450)
POTASSIUM SERPL-SCNC: 3.5 MMOL/L (ref 3.4–5.3)
RBC # BLD AUTO: 4.42 10E12/L (ref 3.8–5.2)
SODIUM SERPL-SCNC: 141 MMOL/L (ref 133–144)
TSH SERPL DL<=0.005 MIU/L-ACNC: 1.26 MU/L (ref 0.4–4)
WBC # BLD AUTO: 7.6 10E9/L (ref 4–11)

## 2019-09-12 PROCEDURE — 84443 ASSAY THYROID STIM HORMONE: CPT | Performed by: PSYCHIATRY & NEUROLOGY

## 2019-09-12 PROCEDURE — 12400000 ZZH R&B MH

## 2019-09-12 PROCEDURE — 99285 EMERGENCY DEPT VISIT HI MDM: CPT | Mod: 25

## 2019-09-12 PROCEDURE — 80048 BASIC METABOLIC PNL TOTAL CA: CPT | Performed by: PSYCHIATRY & NEUROLOGY

## 2019-09-12 PROCEDURE — 85027 COMPLETE CBC AUTOMATED: CPT | Performed by: PSYCHIATRY & NEUROLOGY

## 2019-09-12 PROCEDURE — 81025 URINE PREGNANCY TEST: CPT | Performed by: EMERGENCY MEDICINE

## 2019-09-12 PROCEDURE — 25000132 ZZH RX MED GY IP 250 OP 250 PS 637: Performed by: PSYCHIATRY & NEUROLOGY

## 2019-09-12 PROCEDURE — 80307 DRUG TEST PRSMV CHEM ANLYZR: CPT | Performed by: EMERGENCY MEDICINE

## 2019-09-12 PROCEDURE — 36415 COLL VENOUS BLD VENIPUNCTURE: CPT | Performed by: PSYCHIATRY & NEUROLOGY

## 2019-09-12 RX ORDER — TRAZODONE HYDROCHLORIDE 100 MG/1
100-200 TABLET ORAL AT BEDTIME
COMMUNITY
End: 2019-11-25

## 2019-09-12 RX ORDER — HYDROXYZINE HYDROCHLORIDE 25 MG/1
25 TABLET, FILM COATED ORAL 3 TIMES DAILY PRN
Status: DISCONTINUED | OUTPATIENT
Start: 2019-09-12 | End: 2019-09-20 | Stop reason: HOSPADM

## 2019-09-12 RX ORDER — TRAZODONE HYDROCHLORIDE 100 MG/1
100 TABLET ORAL AT BEDTIME
Status: DISCONTINUED | OUTPATIENT
Start: 2019-09-12 | End: 2019-09-20 | Stop reason: HOSPADM

## 2019-09-12 RX ORDER — ARIPIPRAZOLE 10 MG/1
10 TABLET ORAL DAILY
Status: DISCONTINUED | OUTPATIENT
Start: 2019-09-13 | End: 2019-09-20 | Stop reason: HOSPADM

## 2019-09-12 RX ORDER — ARIPIPRAZOLE 10 MG/1
10 TABLET ORAL DAILY
COMMUNITY
End: 2019-10-07

## 2019-09-12 RX ORDER — MIRTAZAPINE 30 MG/1
30 TABLET, FILM COATED ORAL AT BEDTIME
Status: DISCONTINUED | OUTPATIENT
Start: 2019-09-12 | End: 2019-09-20 | Stop reason: HOSPADM

## 2019-09-12 RX ORDER — MIRTAZAPINE 30 MG/1
45 TABLET, FILM COATED ORAL AT BEDTIME
Status: ON HOLD | COMMUNITY
End: 2020-01-16

## 2019-09-12 RX ADMIN — MIRTAZAPINE 30 MG: 30 TABLET, FILM COATED ORAL at 20:15

## 2019-09-12 RX ADMIN — TRAZODONE HYDROCHLORIDE 100 MG: 100 TABLET ORAL at 20:15

## 2019-09-12 SDOH — HEALTH STABILITY: MENTAL HEALTH: HOW OFTEN DO YOU HAVE A DRINK CONTAINING ALCOHOL?: NEVER

## 2019-09-12 ASSESSMENT — ENCOUNTER SYMPTOMS
UNEXPECTED WEIGHT CHANGE: 0
VOMITING: 0

## 2019-09-12 ASSESSMENT — ACTIVITIES OF DAILY LIVING (ADL)
COGNITION: 0 - NO COGNITION ISSUES REPORTED
HYGIENE/GROOMING: INDEPENDENT
ORAL_HYGIENE: INDEPENDENT
DRESS: INDEPENDENT

## 2019-09-12 ASSESSMENT — MIFFLIN-ST. JEOR: SCORE: 1727.34

## 2019-09-12 NOTE — ED NOTES
Bed: ED18  Expected date:   Expected time:   Means of arrival:   Comments:  manoj - 34 F psych eval eta 0952

## 2019-09-12 NOTE — PHARMACY-ADMISSION MEDICATION HISTORY
Admission medication history interview status for the 9/12/2019  admission is complete. See EPIC admission navigator for prior to admission medications     Medication history source reliability:Good    Actions taken by pharmacist (provider contacted, etc): Spoke with patient. Verified medications with [facility] pharmacy     Additional medication history information not noted on PTA med list :None    Medication reconciliation/reorder completed by provider prior to medication history? No    Time spent in this activity: 20 mins    Prior to Admission medications    Medication Sig Last Dose Taking? Auth Provider   ARIPiprazole (ABILIFY) 10 MG tablet Take 10 mg by mouth daily 9/12/2019 at am Yes Unknown, Entered By History   hydrOXYzine (ATARAX) 25 MG tablet Take 25 mg by mouth 3 times daily as needed for anxiety 9/12/2019 at am Yes Unknown, Entered By History   mirtazapine (REMERON) 30 MG tablet Take 30 mg by mouth At Bedtime 9/11/2019 at hs Yes Unknown, Entered By History   traZODone (DESYREL) 100 MG tablet Take 100 mg by mouth At Bedtime 9/11/2019 at hs Yes Unknown, Entered By History   vortioxetine (TRINTELLIX/BRINTELLIX) 20 MG tablet Take 20 mg by mouth daily 9/12/2019 at am Yes Unknown, Entered By History

## 2019-09-12 NOTE — PROGRESS NOTES
Pt was placed on a 72 hr hold today by [facility]; papers received here via fax. Pt was aware that hold had been placed and pt has a copy of her hold papers.

## 2019-09-12 NOTE — ED TRIAGE NOTES
Pt sent from therapist office after she expressed feelings of suicidality. Pt sts p[fara is to right eye. Dr Corona accepted pt but there are no beds upstairs.

## 2019-09-12 NOTE — ED PROVIDER NOTES
History     Chief Complaint:  Suicidal    HPI   Misty Parham is a 34 year old female with history of depression who presents with suicidal ideology. Nursing staff reports that the patient recently  from her spouse and has been feeling depressed and suicidal. She was sent here from her therapist's office after expressing feeling of suicide. The patient was set up for direct admit, however there are currently no beds available. Here, the patient endorses that she has a plan to overdose on her Trazodone. She denies any injury, self cutting, vomiting, or any unexpected weight loss, but endorses that she has been able to sleep.    Allergies:  No known drug allergies    Medications:    Abilify   Atarax  Trazodone  Mirtazapine    Past Medical History:    Depressive disorder  Suicidal ideation    Past Surgical History:    Cholecystectomy    Family History:    Diabetes    Social History:  Smoking status: Never smoker  Alcohol use: Not currently  Drug use: Never  PCP: WINIFRED KAY  Presents to the ED with herself  Marital Status:  Legally       Review of Systems   Constitutional: Negative for unexpected weight change.   Gastrointestinal: Negative for vomiting.   Psychiatric/Behavioral: Positive for suicidal ideas. Negative for self-injury.   All other systems reviewed and are negative.    Physical Exam     Patient Vitals for the past 24 hrs:   BP Temp Temp src Pulse Heart Rate Resp SpO2   09/12/19 0933 122/74 99  F (37.2  C) Oral 81 81 16 99 %       Physical Exam  Eyes:               Sclera white; Pupils are equal and round  ENT:                External ears and nares normal  CV:                  Rate as above with regular rhythm   Resp:               Breath sounds clear and equal bilaterally  GI:                   Abdomen is soft, non-tender, non-distended  MS:                  Moves all extremities  Skin:                Warm and dry  Neuro:             Speech is normal and fluent. No apparent  deficit.  Psych:             Looking at floor, no eye contact, appears withdrawn, soft voice     Emergency Department Course   Laboratory:  Drug abuse screen 77 urine: All fields negative  HCG Qualitative Urine: Negative    Emergency Department Course:  Past medical records, nursing notes, and vitals reviewed.  0927: I performed an exam of the patient and obtained history, as documented above.    Findings and plan explained to the patient who consents to admission.     Discussed the patient with Dr. Madrigal, who will admit the patient to a behavioral unit bed for further monitoring, evaluation, and treatment.     Impression & Plan    Medical Decision Making:  Misty Parham is a 34 year old female who presents to the emergency department for evaluation of suicidal ideation with a plan. Unfortunately there were no beds available in our in patient unit and therefore she came into the emergency department on a hold. She does not have any medical symptoms requiring labs for medical clearance. She is medically clear at this time. Urine drug screen and urine pregnancy test were normal. Bed was found here at [facility] and she was admitted.     Diagnosis:    ICD-10-CM    1. Suicidal ideation R45.851        Disposition: Admitted to behavior health unit.    I, Laure Blake, am serving as a scribe at 9:27 AM on 9/12/2019 to document services personally performed by Tamara Son MD based on my observations and the provider's statements to me.     Laure Blake  9/12/2019    EMERGENCY DEPARTMENT       Tamara Son MD  09/12/19 7478

## 2019-09-12 NOTE — PROGRESS NOTES
09/12/19 1352   Patient Belongings   Did you bring any home meds/supplements to the hospital?  Yes   Patient Belongings locker   Patient Belongings Put in Hospital Secure Location (Security or Locker, etc.) clothing;wallet;purse/wallet;other (see comments)   Belongings Search Yes   Clothing Search Yes   Second Staff Virgie   Comment Pt belongings searched and put in pt locker.     Pt belongings:  Grey purse  Glasses  Shirt  Sweater  Pants Shoes  Socks  ibuprofen  Cellphone x2  Cellphone charging cord  Headphones  Keys  Address book  Notebook  Wallet  [employer]. Cards  [place]stick  Gum  Pads  Loose change  Nasal decongestaint  Pens  Phone bradshaw  $17.00 cash  [place] 's license  [employer] card  Triple [employer] card  Gas card  Insurance card  Employee card    Security Envelope #[id number]  Visa #[id number]   Visa #[id number]      Admission:  I am responsible for any personal items that are not sent to the safe or pharmacy.  [facility] is not responsible for loss, theft or damage of any property in my possession.    Signature:  _________________________________ Date: _______  Time: _____                                              Staff Signature:  ____________________________ Date: ________  Time: _____      2nd Staff person, if patient is unable/unwilling to sign:    Signature: ________________________________ Date: ________  Time: _____     Discharge:  [facility] has returned all of my personal belongings:    Signature: _________________________________ Date: ________  Time: _____                                          Staff Signature:  ____________________________ Date: ________  Time: _____

## 2019-09-13 PROCEDURE — 99222 1ST HOSP IP/OBS MODERATE 55: CPT | Performed by: PHYSICIAN ASSISTANT

## 2019-09-13 PROCEDURE — 25000132 ZZH RX MED GY IP 250 OP 250 PS 637: Performed by: PSYCHIATRY & NEUROLOGY

## 2019-09-13 PROCEDURE — 12400000 ZZH R&B MH

## 2019-09-13 RX ORDER — ACETAMINOPHEN 325 MG/1
650 TABLET ORAL EVERY 6 HOURS PRN
Status: DISCONTINUED | OUTPATIENT
Start: 2019-09-13 | End: 2019-09-20 | Stop reason: HOSPADM

## 2019-09-13 RX ADMIN — TRAZODONE HYDROCHLORIDE 100 MG: 100 TABLET ORAL at 21:01

## 2019-09-13 RX ADMIN — VORTIOXETINE 20 MG: 20 TABLET, FILM COATED ORAL at 09:37

## 2019-09-13 RX ADMIN — MIRTAZAPINE 30 MG: 30 TABLET, FILM COATED ORAL at 21:01

## 2019-09-13 RX ADMIN — BREXPIPRAZOLE 1 MG: 1 TABLET ORAL at 10:32

## 2019-09-13 RX ADMIN — ARIPIPRAZOLE 10 MG: 10 TABLET ORAL at 09:37

## 2019-09-13 ASSESSMENT — ACTIVITIES OF DAILY LIVING (ADL)
DRESS: INDEPENDENT
HYGIENE/GROOMING: INDEPENDENT
ORAL_HYGIENE: PROMPTS
ORAL_HYGIENE: INDEPENDENT
DRESS: PROMPTS
LAUNDRY: UNABLE TO COMPLETE
HYGIENE/GROOMING: PROMPTS

## 2019-09-13 NOTE — H&P
"[facility] Psychiatric H&P Note       Initial History     The patient's care was discussed with the treatment team and chart notes were reviewed.     Patient examined for psychiatric admission.     IDENTIFICATION  Patient is a 34 year old female who currently resides in [place], [place]. Pt sees PCP Monse Wilde. Patient follows Drew Juarez NP for psychiatric care at [facility]. Pt seen on 9/13/19 by Dr. Madrigal.    CHIEF COMPLAINT    Suicidial ideations, plan to overdose on Trazodone \"I just want to sleep and not wake up.\"      HISTORY OF PRESENT ILLNESS  This is a 33 year old female with a previous psychiatric diagnoses that include major depression, generalized anxiety, and traits of borderline personality disorder. She obtains four previous inpatient mental health hospitalizations, her most recent being approximately 3 months ago here at [facility] (6/20/19-7/03/19). The patient was presented to [facility] ED on 9/12/19 due to suicidal ideation. The patient just recently  from her spouse, which has caused worsening symptoms of depression along with suicidal ideation.  She reports today that she still has the feeling of wanting to die.  She reiterates her plan to overdose on Trazodone.  9/12/2019 ED note reviewed, asked patient about having financial difficulties with her and her  (seperated).  She reports she has been seperated from her  for 5-6 years.  The have not filed for divorce \"we still care and love eachother.\"  They see eachother daily.  She acknowledges that her relationship with her  is the major contributor to her depression and that she wants her relationship to change.  She clarifies \"I just want to be on the same page with our finances.\"      She endorses a loss of appetite stating \"I am mad atmyself right now and I don't eat much when I am mad at myslef.\"  She is mad at herself because \"it is my third time ehre.\"  She does like to " "color, \"I use to do that every day, but I haven't for awhile now.\"  She acknolwdges not feeling interested in coloring for the past couple months.  She recently got into an argument with her  when she told him \"I wanted to die. He said I was being selfish.\"  She feels \"that he is probably right.\"  She mentions additional stressors in her life \"gettign the kids ready for school,\" her work schedule and her oldest son being away from home.  Her oldest son has been living with her sister over the past summer.  She states \"he likes it better over there.\"  She misses him and states she hasn't talked to him that much this month. She expresses interest in seeing her son more often, however endorses some anxiety with driving.  She states her son is pretty busy and has been playing football.  Discussed going to a football game, she states \"he would love that.\"  She has been having difficulting with regulaing her medications due to work schedule.  She is working full time with alternating morning and evening shifts, she feels that this makes it hard for her to manage her medications, however she is still taking them as prescribed.  She reports having talked to her employeer two weeks ago about working only morning shifts and reports they are aware of her depression.  She does not talk to many people about her issues.  She states \"I do not have any friends.\"  However, she acknowledges having a close highschool friend named Lor.  She has not talked to her in a \"couple months.\"  She feels that talking with oLr about her issues might be helpful stating \"she did help me through high-school.\"  She reports talking to Drew Juarez NP on 9/12/2019 about switching from Abilify to Rexulti, she expresses interest in this plan.      CHEMICAL DEPENDENCY HISTORY  Patient does not obtain a chemical dependency history. She has never undergone chemical dependency treatment. Denies illicit drug use.     PAST  PSYCHIATRIC " "HISTORY  The patient obtains four previous inpatient mental health hospitalizations. Three here at [facility] and another at [facility] in 2015. Her most recent admission was here at [facility] from 6/20/19 to 7/03/19. She has followed Drew Juarez NP through [facility] for psychiatric care. Prior to admission medications include: Abilify, Remeron, and Atarax. Previous psychiatric medications include: Wellbutrin, Hydroxyzine, Trintellix, and Zoloft     FAMILY HISTORY  Patient believes that her mother had depression and had issues with alcohol. Her biological father emily had issues with mental illness and chemicals. Her maternal aunt likely has depression and chemical use as well. Her eldest son has started exhibiting the same symptoms as the patient. She states that her family is \"anti-doctor.\"    SOCIAL HISTORY  The patient was raised as one of three children before her parents got a divorce. Her oldest son is 16-years-old, her step daughter is 21-years-old and her youngest son is 13-years-old.  Her oldest son has been living with her sister in [place].  She has been living with her mother since she has been  with her  (5-6 years).  She has one adopted brother. She declares that her family was supportive and caring throughout her childhood. She graduated high school and did not obtain further education. Patient was  to her  from [facility] for 15 years, however they have  (have been for the past 5-6 years). The couple has biological child together. Her ex- provides little support in regards of their children. 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      Medications     Medications Prior to Admission   Medication Sig Dispense Refill Last Dose     ARIPiprazole (ABILIFY) 10 MG tablet Take 10 mg by mouth daily   9/12/2019 at am     hydrOXYzine (ATARAX) 25 MG tablet Take 25 mg by mouth 3 times daily as needed for anxiety   " "9/12/2019 at am     mirtazapine (REMERON) 30 MG tablet Take 30 mg by mouth At Bedtime   9/11/2019 at hs     traZODone (DESYREL) 100 MG tablet Take 100 mg by mouth At Bedtime   9/11/2019 at hs     vortioxetine (TRINTELLIX/BRINTELLIX) 20 MG tablet Take 20 mg by mouth daily   9/12/2019 at am       Scheduled Medications:    ARIPiprazole  10 mg Oral Daily     mirtazapine  30 mg Oral At Bedtime     traZODone  100 mg Oral At Bedtime     vortioxetine  20 mg Oral Daily     PRNs:  hydrOXYzine      Allergies      No Known Allergies     Previous Medical History     Past Medical History:   Diagnosis Date     Depressive disorder         Medical Review of Systems     /80   Pulse 78   Temp 98.2  F (36.8  C) (Oral)   Resp 16   Ht 1.676 m (5' 6\")   Wt 102.1 kg (225 lb)   SpO2 98%   BMI 36.32 kg/m    Body mass index is 36.32 kg/m .    Previous 10-point ROS completed by Tamara Son MD on 9/12/2019 reviewed by Zhang Madrigal MD on September 13, 2019 and is unchanged except for those problems mentioned within the HPI.     Mental Status Examination     Appearance Sitting in chair, dressed in hospital scrubs. Appears stated age.   Attitude Cooperative   Orientation Oriented to person, place, time   Eye Contact Poor   Speech Regular rate, rhythm, volume and tone   Language Normal   Psychomotor Behavior Normal   Mood Depressed   Affect Flat   Thought Process Goal-Oriented, Intact   Associations Intact   Thought Content Patient is currently negative for suicidal ideation, negative for plan or intent, able to contract no self harm and identify barriers to suicide.  Negative for obsessions, compulsions or psychosis.     Fund of Knowledge Intact   Insight Fair, understands that the relationship with her  is not healthy   Judgement Poor, continues to have relationship with   and sharing money with him.   Attention Span & Concentration Intact   Recent & Remote Memory Intact   Gait Normal   Muscle " Tone Intact      Labs     Labs reviewed.  Recent Results (from the past 24 hour(s))   Drug abuse screen 77 urine (FL, RH, SH)    Collection Time: 09/12/19  9:47 AM   Result Value Ref Range    Amphetamine Qual Urine Negative NEG^Negative    Barbiturates Qual Urine Negative NEG^Negative    Benzodiazepine Qual Urine Negative NEG^Negative    Cannabinoids Qual Urine Negative NEG^Negative    Cocaine Qual Urine Negative NEG^Negative    Opiates Qualitative Urine Negative NEG^Negative    PCP Qual Urine Negative NEG^Negative   HCG qualitative urine (UPT)    Collection Time: 09/12/19  9:47 AM   Result Value Ref Range    HCG Qual Urine Negative NEG^Negative   CBC with platelets    Collection Time: 09/12/19  2:31 PM   Result Value Ref Range    WBC 7.6 4.0 - 11.0 10e9/L    RBC Count 4.42 3.8 - 5.2 10e12/L    Hemoglobin 12.7 11.7 - 15.7 g/dL    Hematocrit 39.4 35.0 - 47.0 %    MCV 89 78 - 100 fl    MCH 28.7 26.5 - 33.0 pg    MCHC 32.2 31.5 - 36.5 g/dL    RDW 15.0 10.0 - 15.0 %    Platelet Count 306 150 - 450 10e9/L   Basic metabolic panel    Collection Time: 09/12/19  2:31 PM   Result Value Ref Range    Sodium 141 133 - 144 mmol/L    Potassium 3.5 3.4 - 5.3 mmol/L    Chloride 111 (H) 94 - 109 mmol/L    Carbon Dioxide 25 20 - 32 mmol/L    Anion Gap 5 3 - 14 mmol/L    Glucose 101 (H) 70 - 99 mg/dL    Urea Nitrogen 10 7 - 30 mg/dL    Creatinine 0.86 0.52 - 1.04 mg/dL    GFR Estimate 87 >60 mL/min/[1.73_m2]    GFR Estimate If Black >90 >60 mL/min/[1.73_m2]    Calcium 9.1 8.5 - 10.1 mg/dL   TSH with free T4 reflex and/or T3 as indicated    Collection Time: 09/12/19  2:31 PM   Result Value Ref Range    TSH 1.26 0.40 - 4.00 mU/L        Impression   This is a 33 year old female with previous psychiatric diagnoses that include major depression, generalized anxiety, and traits of borderline personality disorder. She obtains four previous inpatient mental health hospitalizations, her most recent being approximately 3 months ago here at [facility].  Patient was presented to [facility] ED this time on 9/12/19 for suicidal ideation. She attains worsening symptoms of depression and suicidal ideation due to recently  from her spouse. While meeting with Dr. Madrigal this morning, the patient stressed obtaining life stressors that include finances, work, her son, and her relationship with her ex-. Dr. Lomax deemed the patient appropriate for ECT treatment at this time. She will undergo a maintenance session on 9/16/19. In addition to this, Dr. Madrigal would like to try the patient on the medication Rexulti. After reviewing this medication with patient in detail, the patient agrees to try this, thus Dr. Madrigal will start on a low dose of 1mg.     Assessment of Suicide Risk: Patient is currently positive for suicidal ideation. Able to contract no self harm and identify barriers to suicide.       Diagnoses   1. Major depression, recurrent, severe, without psychotic features.  2. Generalized Anxiety Disorder  3. Traits of Borderline Personality Disorder      Plan     1. Explained side effects, benefits, and complications of medications to the patient, Pt gave verbal consent.  2. Medication changes: Started Rexulti 1mg daily   3. Discussed treatment plan with patient and team.  4. Projected length of stay: 7+ days  5. Patient will undergo ECT on 9/16/19      Attestation:   Alessandra LEBLANC, am serving as a scribe on 9/13/2019 to document services personally performed by Zhang Madrigal MD based on my observations and the provider's statements to me.    Patient ID:  Name: Misty Parham MRN: [id number]  Admission: 9/12/2019 YOB: 1985

## 2019-09-14 PROCEDURE — 12400000 ZZH R&B MH

## 2019-09-14 PROCEDURE — 25000132 ZZH RX MED GY IP 250 OP 250 PS 637: Performed by: PSYCHIATRY & NEUROLOGY

## 2019-09-14 RX ADMIN — BREXPIPRAZOLE 1 MG: 1 TABLET ORAL at 09:01

## 2019-09-14 RX ADMIN — VORTIOXETINE 20 MG: 20 TABLET, FILM COATED ORAL at 09:01

## 2019-09-14 RX ADMIN — MIRTAZAPINE 30 MG: 30 TABLET, FILM COATED ORAL at 20:42

## 2019-09-14 RX ADMIN — ARIPIPRAZOLE 10 MG: 10 TABLET ORAL at 09:01

## 2019-09-14 RX ADMIN — TRAZODONE HYDROCHLORIDE 100 MG: 100 TABLET ORAL at 20:42

## 2019-09-14 ASSESSMENT — ACTIVITIES OF DAILY LIVING (ADL)
ORAL_HYGIENE: INDEPENDENT
DRESS: INDEPENDENT
HYGIENE/GROOMING: INDEPENDENT

## 2019-09-15 PROCEDURE — 12400000 ZZH R&B MH

## 2019-09-15 PROCEDURE — 25000132 ZZH RX MED GY IP 250 OP 250 PS 637: Performed by: PSYCHIATRY & NEUROLOGY

## 2019-09-15 RX ADMIN — ARIPIPRAZOLE 10 MG: 10 TABLET ORAL at 09:10

## 2019-09-15 RX ADMIN — VORTIOXETINE 20 MG: 20 TABLET, FILM COATED ORAL at 09:10

## 2019-09-15 RX ADMIN — BREXPIPRAZOLE 1 MG: 1 TABLET ORAL at 09:10

## 2019-09-15 RX ADMIN — MIRTAZAPINE 30 MG: 30 TABLET, FILM COATED ORAL at 20:19

## 2019-09-15 RX ADMIN — TRAZODONE HYDROCHLORIDE 100 MG: 100 TABLET ORAL at 20:19

## 2019-09-15 ASSESSMENT — ACTIVITIES OF DAILY LIVING (ADL)
LAUNDRY: WITH SUPERVISION
DRESS: INDEPENDENT
HYGIENE/GROOMING: INDEPENDENT
ORAL_HYGIENE: INDEPENDENT
ORAL_HYGIENE: INDEPENDENT
HYGIENE/GROOMING: INDEPENDENT

## 2019-09-16 PROCEDURE — 12400000 ZZH R&B MH

## 2019-09-16 PROCEDURE — 25000132 ZZH RX MED GY IP 250 OP 250 PS 637: Performed by: PSYCHIATRY & NEUROLOGY

## 2019-09-16 PROCEDURE — 90853 GROUP PSYCHOTHERAPY: CPT

## 2019-09-16 RX ADMIN — HYDROXYZINE HYDROCHLORIDE 25 MG: 25 TABLET ORAL at 09:55

## 2019-09-16 RX ADMIN — BREXPIPRAZOLE 2 MG: 1 TABLET ORAL at 09:04

## 2019-09-16 RX ADMIN — TRAZODONE HYDROCHLORIDE 100 MG: 100 TABLET ORAL at 21:10

## 2019-09-16 RX ADMIN — ARIPIPRAZOLE 10 MG: 10 TABLET ORAL at 09:04

## 2019-09-16 RX ADMIN — MIRTAZAPINE 30 MG: 30 TABLET, FILM COATED ORAL at 21:10

## 2019-09-16 RX ADMIN — VORTIOXETINE 20 MG: 20 TABLET, FILM COATED ORAL at 09:04

## 2019-09-16 ASSESSMENT — ACTIVITIES OF DAILY LIVING (ADL)
LAUNDRY: WITH SUPERVISION
DRESS: INDEPENDENT
HYGIENE/GROOMING: INDEPENDENT
ORAL_HYGIENE: INDEPENDENT

## 2019-09-16 NOTE — PROGRESS NOTES
[facility] Psychiatric Progress Note       Interim History   The patient's care was discussed with the treatment team and chart notes were reviewed. Over the weekend, per attending nursing and psych associate staff notes, the patient was isolative, reserved, withdrawn, and reported being anxious and depressed in mood along with endorsing passive suicidal ideation. She was medication compliant, cooperative in overall care, however spent much of her time bed resting. Pt seen on 9/16/19 by Dr. Madrigal. On interview, the patient reports she is doing alright this morning. She denies feeling any less depressed in comparison to admission date. She denies experiencing any side effects from the new medication, Rexulti, that was started on 9/13/19. Dr. Madrigal would like to increase this medication to 2mg daily. Patient in agreement with this. If patient does not respond to this increased dose of Rexulti, will need to consider starting ECT.      Hospital Course   This is a 33 year old female with previous psychiatric diagnoses that include major depression, generalized anxiety, and traits of borderline personality disorder. She obtains four previous inpatient mental health hospitalizations, her most recent being approximately 3 months ago here at [facility]. Patient was presented to [facility] ED this time on 9/12/19 for suicidal ideation. She attains worsening symptoms of depression and suicidal ideation due to recently  from her spouse. While meeting with Dr. Madrigal this morning, the patient stressed obtaining life stressors that include finances, work, her son, and her relationship with her ex-. Dr. Lomax deemed the patient appropriate for ECT treatment at this time. She will undergo a maintenance session on 9/16/19. In addition to this, Dr. Madrigal would like to try the patient on the medication Rexulti. After reviewing this medication with patient in detail, the patient agrees to  "try this, thus Dr. Madrigal will start on a low dose of 1mg.      Medications     Current Facility-Administered Medications Ordered in Epic   Medication Dose Route Frequency Last Rate Last Dose     acetaminophen (TYLENOL) tablet 650 mg  650 mg Oral Q6H PRN         ARIPiprazole (ABILIFY) tablet 10 mg  10 mg Oral Daily   10 mg at 09/15/19 0910     brexpiprazole (REXULTI) tablet 1 mg  1 mg Oral Daily   1 mg at 09/15/19 0910     hydrOXYzine (ATARAX) tablet 25 mg  25 mg Oral TID PRN         mirtazapine (REMERON) tablet 30 mg  30 mg Oral At Bedtime   30 mg at 09/15/19 2019     traZODone (DESYREL) tablet 100 mg  100 mg Oral At Bedtime   100 mg at 09/15/19 2019     vortioxetine (TRINTELLIX/BRINTELLIX) tablet 20 mg  20 mg Oral Daily   20 mg at 09/15/19 0910     No current [facility]-ordered outpatient medications on file.         Allergies      No Known Allergies     Medical Review of Systems     /72   Pulse 69   Temp 98.2  F (36.8  C) (Oral)   Resp 16   Ht 1.676 m (5' 6\")   Wt 102.1 kg (225 lb)   SpO2 99%   BMI 36.32 kg/m    Body mass index is 36.32 kg/m .  A 10-point review of systems was performed by Zhang Madrigal MD and is negative, no new findings.      Psychiatric Examination     Appearance Sitting in chair, dressed in hospital scrubs. Appears stated age.   Attitude Cooperative   Orientation Oriented to person, place, time   Eye Contact Good   Speech Regular rate, rhythm, soft in volume and tone   Language Normal   Psychomotor Behavior Normal   Mood Depressed    Affect Flat   Thought Process Goal-Oriented, Intact   Associations Intact   Thought Content Patient is currently negative for suicidal ideation, negative for plan or intent, able to contract no self harm and identify barriers to suicide.  Negative for obsessions, compulsions or psychosis.     Fund of Knowledge Appropriate    Insight Fair   Judgement Fair   Attention Span & Concentration Intact   Recent & Remote Memory Intact   Gait Normal   Muscle " Tone Intact        Labs     Labs reviewed.  No results found for this or any previous visit (from the past 24 hour(s)).     Impression   This is a 33 year old female with previous psychiatric diagnoses that include major depression, generalized anxiety, and traits of borderline personality disorder. While meeting with Dr. Madrigal this morning, the patient appeared depressed in mood and was flat in affect. She denied her symptoms of depression being improved since admission date. Patient also denied experiencing any side effects from the start of the medication Rexulti (started on 9/13/19). Dr. Madrigal increased this medication dose to 2mg daily. If patient does not respond to this increased dose, will need to consider starting ECT.       Diagnoses   1. Major depression, recurrent, severe, without psychotic features.  2. Generalized Anxiety Disorder  3. Traits of Borderline Personality Disorder     Plan     1. Explained side effects, benefits, and complications of medications to the patient, Pt gave verbal consent.  2. Medication changes: Increased Rexulti dose to 2mg daily   3. Discussed treatment plan with patient and team.  4. Projected length of stay: 7+ days      Attestation:   Alessandra LEBLANC, am serving as a scribe on 9/16/2019 to document services personally performed by Zhang Madrigal MD based on my observations and the provider's statements to me.    Patient ID:  Name: Misty Parham    MRN: [id number]  Admission: 9/12/2019   YOB: 1985

## 2019-09-16 NOTE — PROGRESS NOTES
09/16/19 1400   General Information   Has Not Attended OT as of: 09/16/19     Pt has not attended OT since admit.  Will continue to encourage participation and completion of  self-assessment as able. OT staff will explain the purpose of being involved with treatment plan and provide options to meet current needs and goals.

## 2019-09-17 PROCEDURE — 90853 GROUP PSYCHOTHERAPY: CPT

## 2019-09-17 PROCEDURE — 93010 ELECTROCARDIOGRAM REPORT: CPT | Performed by: INTERNAL MEDICINE

## 2019-09-17 PROCEDURE — 99207 ZZC NON-BILLABLE SERV PER CHARTING: CPT | Performed by: PHYSICIAN ASSISTANT

## 2019-09-17 PROCEDURE — 25000132 ZZH RX MED GY IP 250 OP 250 PS 637: Performed by: PSYCHIATRY & NEUROLOGY

## 2019-09-17 PROCEDURE — 12400000 ZZH R&B MH

## 2019-09-17 PROCEDURE — 93005 ELECTROCARDIOGRAM TRACING: CPT

## 2019-09-17 RX ADMIN — TRAZODONE HYDROCHLORIDE 100 MG: 100 TABLET ORAL at 21:14

## 2019-09-17 RX ADMIN — VORTIOXETINE 20 MG: 20 TABLET, FILM COATED ORAL at 09:01

## 2019-09-17 RX ADMIN — MIRTAZAPINE 30 MG: 30 TABLET, FILM COATED ORAL at 21:14

## 2019-09-17 RX ADMIN — ARIPIPRAZOLE 10 MG: 10 TABLET ORAL at 09:01

## 2019-09-17 RX ADMIN — BREXPIPRAZOLE 2 MG: 1 TABLET ORAL at 09:02

## 2019-09-17 ASSESSMENT — MIFFLIN-ST. JEOR: SCORE: 1709.65

## 2019-09-17 ASSESSMENT — ACTIVITIES OF DAILY LIVING (ADL)
DRESS: INDEPENDENT
LAUNDRY: WITH SUPERVISION
HYGIENE/GROOMING: INDEPENDENT
ORAL_HYGIENE: INDEPENDENT

## 2019-09-17 NOTE — PROGRESS NOTES
Hospitalist consulted for ECT clearance. Please see my H&P from 9/13/2019 for information regarding medical history, etc. EKG reviewed without any evidence of acute ischemia. Patient has no contraindication to ECT.     Kimber Prince PA-C

## 2019-09-17 NOTE — H&P
Admitted:     09/12/2019      CHIEF COMPLAINT:  Depression with suicidal ideation.      HISTORY OF PRESENT ILLNESS:  Misty Parhma is a 34-year-old female with a history of major depressive disorder who was sent to the Emergency Department from her therapist's office after expressing suicidal ideation in the setting of her worsening depression.  She reported significant stress due to a separation with her spouse and development of suicidal thoughts with a plan to overdose on trazodone.  She denied any self-harm or attempts prior to admission.  She was placed on 72-hour hold in the Emergency Department and admitted to Psychiatry Service.  The patient is presently evaluated in her room and step-down portion of inpatient psychiatry unit.  She has no complaints at this time and is quite flat.  She denies any chest pain, shortness of breath, nausea, vomiting, visual changes.  She has a mild headache at present.  Reports she usually takes Tylenol at home for this.  She reports having a GI bug a few weeks ago, which has resolved.  Otherwise, denies recent illness or injury.  Denies any recent medication changes.  She denies recent chest pain or shortness of breath or any decreased exercise tolerance.  She has no history of seizure, stroke, heart failure, or arrhythmia.      REVIEW OF SYSTEMS:  A 10-point review of systems was conducted and is negative aside from information in the HPI.      PAST MEDICAL HISTORY:   1. Major depressive disorder.   2. Obesity.   3. Generalized anxiety disorder.   4. Insomnia.   5. Borderline personality traits.      PAST SURGICAL HISTORY:  Cholecystectomy.      ALLERGIES:  NO KNOWN DRUG ALLERGIES.      PRIOR TO ADMISSION MEDICATIONS:    Medications Prior to Admission   Medication Sig Dispense Refill Last Dose     ARIPiprazole (ABILIFY) 10 MG tablet Take 10 mg by mouth daily   9/12/2019 at am     hydrOXYzine (ATARAX) 25 MG tablet Take 25 mg by mouth 3 times daily as needed for anxiety    9/12/2019 at am     mirtazapine (REMERON) 30 MG tablet Take 30 mg by mouth At Bedtime   9/11/2019 at hs     traZODone (DESYREL) 100 MG tablet Take 100 mg by mouth At Bedtime   9/11/2019 at hs     vortioxetine (TRINTELLIX/BRINTELLIX) 20 MG tablet Take 20 mg by mouth daily   9/12/2019 at am           FAMILY HISTORY:  Reviewed and noncontributory.      SOCIAL HISTORY:  The patient denies alcohol or drug use.  She has no smoking history.  She currently works at [employer].      LABORATORY DATA:  CBC, BMP, TSH are within normal limits.  UDS is negative and pregnancy test is negative as well.      PHYSICAL EXAMINATION:   VITAL SIGNS:  Temperature 97.7, heart rate 75, blood pressure 120/74, respiratory rate 16, oxygen saturation is 97% on room air.   GENERAL:  Alert and oriented female sitting up in bed, appears calm and is appropriately conversant.  Affect is very flat.   HEENT:  Pupils equal and reactive to light, EOMI.   ENT:  Mucous membranes are moist.   CARDIOVASCULAR:  Regular rate and rhythm, no murmurs appreciated.   RESPIRATORY:  Lungs are clear to auscultation bilaterally.  No increased work of breathing or wheezing.   GASTROINTESTINAL:  Positive bowel sounds.  Abdomen is soft, nontender to palpation.   SKIN:  Warm and dry.   MUSCULOSKELETAL:  The patient moves all 4 extremities spontaneously.  No gross deformity.   NEUROLOGIC:  Cranial nerves II-XII are grossly intact.  No focal deficits.  Speech is clear.      ASSESSMENT AND PLAN:  Misty Parham is a 34-year-old female with a history of depression who presented to the Emergency Department from her therapist's office for further evaluation and management of worsening depression with suicidal ideation.  Hospitalist Service was consulted for medical evaluation.   1. Suicidal ideation.  The patient reports worsening depression and stressors related to her separation from her .  She does have a history of depression with prior psychiatric hospitalizations.  She  has undergone ECT in the past.  She reported suicidal thoughts with a plan to overdose on trazodone on admission, denied attempts.  Laboratory evaluation is unremarkable.   -- Primary management is per Psychiatry Service.   -- No medical contraindication to ECT.  If this is pursued, we will need to get an EKG.   2. Headache.  Denies visual changes, focal weakness.  We will order trazodone p.r.n.   3. Insomnia.  We will defer sleep management to primary service.   4. Obesity.  Follow up with primary care provider.      Hospitalist Service will sign off.  The patient is medically stable.  Please call with any questions or concerns.      The patient was seen and examined with Dr. María Noriega who agrees with the above plan.         Revised Account:   2019, diego NORIEGA MD       As dictated by BOBBY GREEN PA-C            D: 2019   T: 2019   MT: GIOVANNI      Name:     CHASITY PERKINS   MRN:      [id number]        Account:      [id number]   :      1985        Admitted:     2019                   Document: [id number]       cc: Monse MELENDREZ

## 2019-09-17 NOTE — PROGRESS NOTES
[facility] Psychiatric Progress Note       Interim History   The patient's care was discussed with the treatment team and chart notes were reviewed. Yesterday, the patient expressed to a staff member that she does not feel she has made progress since admission. She proceeds to attain suicidal ideation along with a feeling of hopelessness. Patient reported that all she wanted to do while here is sleep. Pt seen on 9/17/19 by Dr. Madrigal. Patient notes she is doing alright this morning, feeling only a little better. She denies experiencing any side effects or obtaining any concerns in regards of the increase made in Rexulti yesterday. Dr. Madrigal would like the patient to undergo one maintenance ECT tomorrow. Patient in agreement with this. Aside from this, the patients aftercare plans and living situation are considered. Patient notes that she is currently living with her mother whose moods are unpredictable. Her mother drinks heavily. The patient is still legally , however has been  from her  for some time (he lives in an apartment with roommates). Due to the lack of support, Dr. Madrigal highly encourages the patient to reconnect with a therapist from [facility] after this hospital stay. Patient acknowledges, but notes that it may take some time to arrange this due to her work schedule.      Hospital Course   This is a 33 year old female with previous psychiatric diagnoses that include major depression, generalized anxiety, and traits of borderline personality disorder. She obtains four previous inpatient mental health hospitalizations, her most recent being approximately 3 months ago here at [facility]. Patient was presented to [facility] ED this time on 9/12/19 for suicidal ideation. She attains worsening symptoms of depression and suicidal ideation due to recently  from her spouse. While meeting with Dr. Madrigal this morning, the patient stressed obtaining  "life stressors that include finances, work, her son, and her relationship with her ex-. Dr. Lomax deemed the patient appropriate for ECT treatment at this time. She will undergo a maintenance session on 9/16/19. In addition to this, Dr. Madrigal would like to try the patient on the medication Rexulti. After reviewing this medication with patient in detail, the patient agrees to try this, thus Dr. Madrigal will start on a low dose of 1mg. Patient tolerated this medication well, however denied experiencing fewer symptoms of depression, thus Dr. Madrigal increased Rexulti dose to 2mg.      Medications     Current Facility-Administered Medications Ordered in Epic   Medication Dose Route Frequency Last Rate Last Dose     acetaminophen (TYLENOL) tablet 650 mg  650 mg Oral Q6H PRN         ARIPiprazole (ABILIFY) tablet 10 mg  10 mg Oral Daily   10 mg at 09/16/19 0904     brexpiprazole (REXULTI) tablet 2 mg  2 mg Oral Daily   2 mg at 09/16/19 0904     hydrOXYzine (ATARAX) tablet 25 mg  25 mg Oral TID PRN   25 mg at 09/16/19 0955     mirtazapine (REMERON) tablet 30 mg  30 mg Oral At Bedtime   30 mg at 09/16/19 2110     traZODone (DESYREL) tablet 100 mg  100 mg Oral At Bedtime   100 mg at 09/16/19 2110     vortioxetine (TRINTELLIX/BRINTELLIX) tablet 20 mg  20 mg Oral Daily   20 mg at 09/16/19 0904     No current [facility]-ordered outpatient medications on file.         Allergies      No Known Allergies     Medical Review of Systems     /73   Pulse 81   Temp 98  F (36.7  C) (Oral)   Resp 16   Ht 1.676 m (5' 6\")   Wt 102.1 kg (225 lb)   SpO2 98%   BMI 36.32 kg/m    Body mass index is 36.32 kg/m .  A 10-point review of systems was performed by Zhang Madrigal MD and is negative, no new findings.      Psychiatric Examination     Appearance Sitting in chair, dressed in hospital scrubs. Appears stated age.   Attitude Cooperative   Orientation Oriented to person, place, time   Eye Contact Good   Speech " Regular rate, rhythm, soft in volume and tone   Language Normal   Psychomotor Behavior Normal   Mood Depressed    Affect Flat   Thought Process Goal-Oriented, Intact   Associations Intact   Thought Content Patient is currently negative for suicidal ideation, negative for plan or intent, able to contract no self harm and identify barriers to suicide.  Negative for obsessions, compulsions or psychosis.     Fund of Knowledge Appropriate    Insight Fair   Judgement Fair   Attention Span & Concentration Intact   Recent & Remote Memory Intact   Gait Normal   Muscle Tone Intact        Labs     Labs reviewed.  No results found for this or any previous visit (from the past 24 hour(s)).     Impression   This is a 33 year old female with previous psychiatric diagnoses that include major depression, generalized anxiety, and traits of borderline personality disorder. The patient presented calm, quiet, and a bit brighter in mood in comparison to yesterday's interview. Patient did however report attaining somewhat of a depressed mood, even with the increase om Rexulti. She denied experiencing any side effects or attaining any issues with the increase made in her Rexulti. Dr. Madrigal will leave medication regimen as is today. He however would like the patient to undergo one maintenance ECT tomorrow (9/18/19). Patient in agreement with this plan. Aside from this, the patient aftercare plans were discussed during today's interview. Due to patients lack of support outside of the hospital setting, Dr. Madrigal highly encouraged the patient to meet with a therapist at [facility] once again.       Diagnoses   1. Major depression, recurrent, severe, without psychotic features.  2. Generalized Anxiety Disorder  3. Traits of Borderline Personality Disorder     Plan     1. Explained side effects, benefits, and complications of medications to the patient, Pt gave verbal consent.  2. Medication changes: none   3. Discussed  treatment plan with patient and team.  4. Projected length of stay: 7+ days  5. Maintenance ECT tomorrow, 9/18/19      Attestation:   I, Alessandra Chambers, am serving as a scribe on 9/17/2019 to document services personally performed by Zhang Madrigal MD based on my observations and the provider's statements to me.    Patient ID:  Name: Misty Parham    MRN: [id number]  Admission: 9/12/2019   YOB: 1985

## 2019-09-18 ENCOUNTER — ANESTHESIA (OUTPATIENT)
Dept: SURGERY | Facility: CLINIC | Age: 34
End: 2019-09-18

## 2019-09-18 ENCOUNTER — ANESTHESIA EVENT (OUTPATIENT)
Dept: SURGERY | Facility: CLINIC | Age: 34
End: 2019-09-18

## 2019-09-18 PROCEDURE — GZB2ZZZ ELECTROCONVULSIVE THERAPY, BILATERAL-SINGLE SEIZURE: ICD-10-PCS | Performed by: PSYCHIATRY & NEUROLOGY

## 2019-09-18 PROCEDURE — 25000125 ZZHC RX 250: Performed by: ANESTHESIOLOGY

## 2019-09-18 PROCEDURE — 25000125 ZZHC RX 250: Performed by: NURSE ANESTHETIST, CERTIFIED REGISTERED

## 2019-09-18 PROCEDURE — 25800030 ZZH RX IP 258 OP 636: Performed by: ANESTHESIOLOGY

## 2019-09-18 PROCEDURE — 90870 ELECTROCONVULSIVE THERAPY: CPT

## 2019-09-18 PROCEDURE — 25000128 H RX IP 250 OP 636: Performed by: NURSE ANESTHETIST, CERTIFIED REGISTERED

## 2019-09-18 PROCEDURE — 25000132 ZZH RX MED GY IP 250 OP 250 PS 637: Performed by: PSYCHIATRY & NEUROLOGY

## 2019-09-18 PROCEDURE — 90853 GROUP PSYCHOTHERAPY: CPT

## 2019-09-18 PROCEDURE — 12400000 ZZH R&B MH

## 2019-09-18 PROCEDURE — 25000132 ZZH RX MED GY IP 250 OP 250 PS 637: Performed by: PHYSICIAN ASSISTANT

## 2019-09-18 RX ORDER — SODIUM CHLORIDE, SODIUM LACTATE, POTASSIUM CHLORIDE, CALCIUM CHLORIDE 600; 310; 30; 20 MG/100ML; MG/100ML; MG/100ML; MG/100ML
500 INJECTION, SOLUTION INTRAVENOUS CONTINUOUS
Status: DISCONTINUED | OUTPATIENT
Start: 2019-09-18 | End: 2019-09-20 | Stop reason: HOSPADM

## 2019-09-18 RX ADMIN — ACETAMINOPHEN 650 MG: 325 TABLET ORAL at 20:59

## 2019-09-18 RX ADMIN — SODIUM CHLORIDE, SODIUM LACTATE, POTASSIUM CHLORIDE, CALCIUM CHLORIDE: 600; 310; 30; 20 INJECTION, SOLUTION INTRAVENOUS at 06:50

## 2019-09-18 RX ADMIN — ACETAMINOPHEN 650 MG: 325 TABLET ORAL at 09:12

## 2019-09-18 RX ADMIN — MIRTAZAPINE 30 MG: 30 TABLET, FILM COATED ORAL at 20:59

## 2019-09-18 RX ADMIN — LIDOCAINE HYDROCHLORIDE 1 ML: 10 INJECTION, SOLUTION EPIDURAL; INFILTRATION; INTRACAUDAL; PERINEURAL at 06:19

## 2019-09-18 RX ADMIN — ARIPIPRAZOLE 10 MG: 10 TABLET ORAL at 09:13

## 2019-09-18 RX ADMIN — SUCCINYLCHOLINE CHLORIDE 100 MG: 20 INJECTION, SOLUTION INTRAMUSCULAR; INTRAVENOUS; PARENTERAL at 07:12

## 2019-09-18 RX ADMIN — METHOHEXITAL SODIUM 100 MG: 500 INJECTION, POWDER, LYOPHILIZED, FOR SOLUTION INTRAMUSCULAR; INTRAVENOUS; RECTAL at 07:12

## 2019-09-18 RX ADMIN — BREXPIPRAZOLE 2 MG: 1 TABLET ORAL at 09:14

## 2019-09-18 RX ADMIN — VORTIOXETINE 20 MG: 20 TABLET, FILM COATED ORAL at 09:14

## 2019-09-18 RX ADMIN — SODIUM CHLORIDE, SODIUM LACTATE, POTASSIUM CHLORIDE, CALCIUM CHLORIDE 500 ML: 600; 310; 30; 20 INJECTION, SOLUTION INTRAVENOUS at 06:13

## 2019-09-18 RX ADMIN — TRAZODONE HYDROCHLORIDE 100 MG: 100 TABLET ORAL at 20:59

## 2019-09-18 ASSESSMENT — ACTIVITIES OF DAILY LIVING (ADL)
DRESS: SCRUBS (BEHAVIORAL HEALTH);INDEPENDENT
DRESS: INDEPENDENT
ORAL_HYGIENE: INDEPENDENT
LAUNDRY: WITH SUPERVISION
HYGIENE/GROOMING: INDEPENDENT
ORAL_HYGIENE: INDEPENDENT
HYGIENE/GROOMING: INDEPENDENT

## 2019-09-18 ASSESSMENT — LIFESTYLE VARIABLES: TOBACCO_USE: 0

## 2019-09-18 NOTE — PROGRESS NOTES
[facility] Psychiatric Progress Note       Interim History   The patient's care was discussed with the treatment team and chart notes were reviewed. Pt seen on 9/18/19 by Dr. Madrigal. The patient underwent maintenance ECT this morning where there were no complications. She denied obtaining any pain after this procedure, however was a bit confused. Dr. Madrigal would like the patient to undergo maintenance ECT once a month after this hospital course. Patient in agreement with this plan. Aside from this, no medication adjustments made today.      Hospital Course   This is a 33 year old female with previous psychiatric diagnoses that include major depression, generalized anxiety, and traits of borderline personality disorder. She obtains four previous inpatient mental health hospitalizations, her most recent being approximately 3 months ago here at [facility]. Patient was presented to [facility] ED this time on 9/12/19 for suicidal ideation. She attains worsening symptoms of depression and suicidal ideation due to recently  from her spouse. While meeting with Dr. Madrigal this morning, the patient stressed obtaining life stressors that include finances, work, her son, and her relationship with her ex-. Dr. Lomax deemed the patient appropriate for ECT treatment at this time. She will undergo a maintenance session on 9/16/19. In addition to this, Dr. Madrigal would like to try the patient on the medication Rexulti. After reviewing this medication with patient in detail, the patient agrees to try this, thus Dr. Madrigal will start on a low dose of 1mg. Patient tolerated this medication well, however denied experiencing fewer symptoms of depression, thus Dr. Madrigal increased Rexulti dose to 2mg. Patient continued to report attaining a depressed mood, even with the increase om Rexulti, thus Dr. Madrigal scheduled patient for one maintenance ECT on 9/18/19. No complications with  "procedure. Patient will undergo maintenance once a month after hospital stay.      Medications     Current Facility-Administered Medications Ordered in Epic   Medication Dose Route Frequency Last Rate Last Dose     acetaminophen (TYLENOL) tablet 650 mg  650 mg Oral Q6H PRN         ARIPiprazole (ABILIFY) tablet 10 mg  10 mg Oral Daily   10 mg at 09/17/19 0901     brexpiprazole (REXULTI) tablet 2 mg  2 mg Oral Daily   2 mg at 09/17/19 0902     hydrOXYzine (ATARAX) tablet 25 mg  25 mg Oral TID PRN   25 mg at 09/16/19 0955     lactated ringers infusion  500 mL Intravenous Continuous 25 mL/hr at 09/18/19 0613       lidocaine 1 % 0.1-1 mL  0.1-1 mL Other Q1H PRN   1 mL at 09/18/19 0619     lidocaine patch in PLACE   Transdermal Q8H         mirtazapine (REMERON) tablet 30 mg  30 mg Oral At Bedtime   30 mg at 09/17/19 2114     sodium chloride (PF) 0.9% PF flush 3 mL  3 mL Intracatheter q1 min prn         sodium chloride (PF) 0.9% PF flush 3 mL  3 mL Intracatheter Q8H         traZODone (DESYREL) tablet 100 mg  100 mg Oral At Bedtime   100 mg at 09/17/19 2114     vortioxetine (TRINTELLIX/BRINTELLIX) tablet 20 mg  20 mg Oral Daily   20 mg at 09/17/19 0901     No current [facility]-ordered outpatient medications on file.         Allergies      No Known Allergies     Medical Review of Systems     /82   Pulse 86   Temp 99.4  F (37.4  C) (Temporal)   Resp 18   Ht 1.676 m (5' 6\")   Wt 100.3 kg (221 lb 1.6 oz)   SpO2 98%   BMI 35.69 kg/m    Body mass index is 35.69 kg/m .  A 10-point review of systems was performed by Zhang Madrigal MD and is negative, no new findings.      Psychiatric Examination     Appearance Sitting in chair, dressed in hospital scrubs. Appears stated age.   Attitude Cooperative   Orientation Oriented to person, place, time   Eye Contact Good   Speech Regular rate, rhythm, soft in volume and tone   Language Normal   Psychomotor Behavior Normal   Mood Depressed    Affect Flat   Thought Process " Goal-Oriented, Intact   Associations Intact   Thought Content Patient is currently negative for suicidal ideation, negative for plan or intent, able to contract no self harm and identify barriers to suicide.  Negative for obsessions, compulsions or psychosis.     Fund of Knowledge Appropriate    Insight Fair   Judgement Fair   Attention Span & Concentration Intact   Recent & Remote Memory Intact   Gait Normal   Muscle Tone Intact        Labs     Labs reviewed.  Recent Results (from the past 24 hour(s))   EKG 12-lead, complete    Collection Time: 09/17/19  4:02 PM   Result Value Ref Range    Interpretation ECG Click View Image link to view waveform and result         Impression   This is a 33 year old female with previous psychiatric diagnoses that include major depression, generalized anxiety, and traits of borderline personality disorder. Patient underwent maintenance ECT this morning where there were no complications. She denied experiencing any pain after procedure, but did obtain some mild confusion. Dr. Madrigal would like the patient to continue with once a month maintenance ECT after this hospital stay. Patient in agreement with this plan. Aside from this, no medication changes were made today. Patient will continue with Rexulti 2mg, Abilify 10mg, Remeron 30mg, Trazodone 100mg, and Trintellix 20mg.      Diagnoses   1. Major depression, recurrent, severe, without psychotic features.  2. Generalized Anxiety Disorder  3. Traits of Borderline Personality Disorder     Plan     1. Explained side effects, benefits, and complications of medications to the patient, Pt gave verbal consent.  2. Medication changes: none   3. Discussed treatment plan with patient and team.  4. Projected length of stay: 7+ days  5. Maintenance ECT 1 month from 9/18/19      Attestation:   Alessandra LEBLANC, am serving as a scribe on 9/18/2019 to document services personally performed by Zhang Madrigla MD based on my observations and  the provider's statements to me.    Patient ID:  Name: Misty Parham    MRN: [id number]  Admission: 9/12/2019   YOB: 1985

## 2019-09-18 NOTE — PROCEDURES
[facility] ECT Procedure Note     Misty Parham [id number]   34 year old 1985     Patient Status: Inpatient     No Known Allergies    Weight:  221 lbs 1.6 oz    Patient Preparations: (none)     Diagnosis:     Major Depression      Indications for ECT:     Medications ineffective     Clinical Narrative:     ECT administered by thymatron machine, consent signed, side effects, risks, benefits reviewed.     Pause for the Cause:     Right patient Yes   Right procedure/laterality settings: Yes   Right diagnosis Yes      Intra-Procedure Documentation:     Date:  9/18/2019  Time:  7:09 AM    ECT #    Treatment number this series: 1    Total treatment number: 1     Type of ECT: Bilateral    ECT Medications:    Brevital: 100mg   Succinyl Choline: 100mg     ECT Strip Summary:   Energy Level: 80 percent  Motor Seizure Duration: 33 seconds  EEG Seizure Duration: 33 seconds    Complications: None     Plan: Maintenance ECT in 1 month       Alessandra LEBLANC, am serving as a scribe on 9/18/2019 to document services personally performed by Zhang Madrigal MD based on my observations and the provider's statements to me.

## 2019-09-18 NOTE — ANESTHESIA PREPROCEDURE EVALUATION
Anesthesia Pre-Procedure Evaluation    Patient: Misty Parham   MRN: [id number] : 1985          Preoperative Diagnosis: * No surgery found *        Past Medical History:   Diagnosis Date     Depressive disorder      Past Surgical History:   Procedure Laterality Date     CHOLECYSTECTOMY         Anesthesia Evaluation     . Pt has had prior anesthetic.     No history of anesthetic complications          ROS/MED HX    ENT/Pulmonary:      (-) tobacco use, asthma and sleep apnea   Neurologic:       Cardiovascular:         METS/Exercise Tolerance:  >4 METS   Hematologic:         Musculoskeletal:         GI/Hepatic:        (-) GERD and cholecystitis/cholelithiasis   Renal/Genitourinary:         Endo:     (+) Obesity (Class 2), .      Psychiatric:     (+) psychiatric history depression      Infectious Disease:         Malignancy:         Other:                          Physical Exam  Normal systems: dental    Airway   Mallampati: III  TM distance: >3 FB  Neck ROM: full  Comment: Poor effort with mouth opening    Dental     Cardiovascular   Rhythm and rate: regular      Pulmonary    breath sounds clear to auscultation            Lab Results   Component Value Date    WBC 7.6 2019    HGB 12.7 2019    HCT 39.4 2019     2019     2019    POTASSIUM 3.5 2019    CHLORIDE 111 (H) 2019    CO2 25 2019    BUN 10 2019    CR 0.86 2019     (H) 2019    CRISTINE 9.1 2019    ALBUMIN 3.7 2019    PROTTOTAL 7.8 2019    ALT 21 2019    AST 20 2019    ALKPHOS 76 2019    BILITOTAL 0.4 2019    TSH 1.26 2019    HCG Negative 2019       Preop Vitals  BP Readings from Last 3 Encounters:   19 119/75   19 134/86   19 100/79    Pulse Readings from Last 3 Encounters:   19 92   19 79   19 56      Resp Readings from Last 3 Encounters:   19 16   19 18   19 16    SpO2  "Readings from Last 3 Encounters:   09/18/19 98%   07/03/19 96%   01/21/19 99%      Temp Readings from Last 1 Encounters:   09/18/19 36.3  C (97.4  F) (Oral)    Ht Readings from Last 1 Encounters:   09/12/19 1.676 m (5' 6\")      Wt Readings from Last 1 Encounters:   09/17/19 100.3 kg (221 lb 1.6 oz)    Estimated body mass index is 35.69 kg/m  as calculated from the following:    Height as of this encounter: 1.676 m (5' 6\").    Weight as of this encounter: 100.3 kg (221 lb 1.6 oz).       Anesthesia Plan      History & Physical Review  History and physical reviewed and following examination; no interval change.    ASA Status:  3 .    NPO Status:  > 8 hours    Plan for General (Mask ventilation) with Intravenous induction.          Postoperative Care  Postoperative pain management:  IV analgesics, Oral pain medications and Multi-modal analgesia.      Consents  Anesthetic plan, risks, benefits and alternatives discussed with:  Patient..                 Tucker Cornelius MD  "

## 2019-09-18 NOTE — ANESTHESIA POSTPROCEDURE EVALUATION
Patient: Misty Parham    * No procedures listed *    Diagnosis:* No pre-op diagnosis entered *  Diagnosis Additional Information: No value filed.    Anesthesia Type:  General    Note:  Anesthesia Post Evaluation    Patient location during evaluation: PACU  Patient participation: Able to fully participate in evaluation  Level of consciousness: awake  Pain management: adequate  Airway patency: patent  Cardiovascular status: acceptable  Respiratory status: acceptable  Hydration status: acceptable  PONV: none             Last vitals:  Vitals:    09/18/19 0602 09/18/19 0721 09/18/19 0725   BP:  120/56 113/60   Pulse:   84   Resp: 16 18 13   Temp:   37.4  C (99.4  F)   SpO2:  100% 99%         Electronically Signed By: Tucker Cornelius MD  September 18, 2019  7:36 AM

## 2019-09-19 LAB — INTERPRETATION ECG - MUSE: NORMAL

## 2019-09-19 PROCEDURE — 25000132 ZZH RX MED GY IP 250 OP 250 PS 637: Performed by: PSYCHIATRY & NEUROLOGY

## 2019-09-19 PROCEDURE — 12400000 ZZH R&B MH

## 2019-09-19 RX ADMIN — TRAZODONE HYDROCHLORIDE 100 MG: 100 TABLET ORAL at 22:07

## 2019-09-19 RX ADMIN — MIRTAZAPINE 30 MG: 30 TABLET, FILM COATED ORAL at 22:07

## 2019-09-19 RX ADMIN — VORTIOXETINE 20 MG: 20 TABLET, FILM COATED ORAL at 08:31

## 2019-09-19 RX ADMIN — ARIPIPRAZOLE 10 MG: 10 TABLET ORAL at 08:31

## 2019-09-19 RX ADMIN — BREXPIPRAZOLE 2 MG: 1 TABLET ORAL at 08:31

## 2019-09-19 ASSESSMENT — ACTIVITIES OF DAILY LIVING (ADL)
HYGIENE/GROOMING: INDEPENDENT
DRESS: INDEPENDENT
HYGIENE/GROOMING: INDEPENDENT
LAUNDRY: WITH SUPERVISION
LAUNDRY: WITH SUPERVISION
ORAL_HYGIENE: INDEPENDENT
DRESS: INDEPENDENT
ORAL_HYGIENE: INDEPENDENT

## 2019-09-19 NOTE — PROGRESS NOTES
"[facility] Psychiatric Progress Note       Interim History   The patient's care was discussed with the treatment team and chart notes were reviewed. Pt seen on 9/19/19 by Dr. Madrigal. Patient states she's doing alright this afternoon, declaring feeling a bit better after the maintenance ECT that occurred yesterday morning. Dr. Madrigal asks more about patients plans for after this hospitalization. She states she plans to cut back at work (currently working full-time at [employer]), however this would mean that she couldn't afford living on her own and would need to continue to reside with her alcoholic mother and step-father. Apparently the patients  manages and spends their money even though they have been \"separate\" for years. When asked why she has not considered divorce she states, \"I don't know, I don't know of anything else.\" Patients mental health aftercare is also considered. Patient notes that the [facility] program was helpful to her in the past (attended approximately 1 year ago), declaring she would attend it again. Dr. Madrigal in agreement with this, thus will have  set up intake for patient.      Hospital Course   This is a 33 year old female with previous psychiatric diagnoses that include major depression, generalized anxiety, and traits of borderline personality disorder. She obtains four previous inpatient mental health hospitalizations, her most recent being approximately 3 months ago here at [facility]. Patient was presented to [facility] ED this time on 9/12/19 for suicidal ideation. She attains worsening symptoms of depression and suicidal ideation due to recently  from her spouse. While meeting with Dr. Madrigal this morning, the patient stressed obtaining life stressors that include finances, work, her son, and her relationship with her ex-. Dr. Lomax deemed the patient appropriate for ECT treatment at this time. She will undergo " "a maintenance session on 9/16/19. In addition to this, Dr. Madrigal would like to try the patient on the medication Rexulti. After reviewing this medication with patient in detail, the patient agrees to try this, thus Dr. Madrigal will start on a low dose of 1mg. Patient tolerated this medication well, however denied experiencing fewer symptoms of depression, thus Dr. Madrigal increased Rexulti dose to 2mg. Patient continued to report attaining a depressed mood, even with the increase om Rexulti, thus Dr. Madrigal scheduled patient for one maintenance ECT on 9/18/19. No complications with procedure. Patient will undergo maintenance once a month after hospital stay.      Medications     Current Facility-Administered Medications Ordered in Epic   Medication Dose Route Frequency Last Rate Last Dose     acetaminophen (TYLENOL) tablet 650 mg  650 mg Oral Q6H PRN   650 mg at 09/18/19 2059     ARIPiprazole (ABILIFY) tablet 10 mg  10 mg Oral Daily   10 mg at 09/19/19 0831     brexpiprazole (REXULTI) tablet 2 mg  2 mg Oral Daily   2 mg at 09/19/19 0831     hydrOXYzine (ATARAX) tablet 25 mg  25 mg Oral TID PRN   25 mg at 09/16/19 0955     lactated ringers infusion  500 mL Intravenous Continuous 25 mL/hr at 09/18/19 0613       lidocaine 1 % 0.1-1 mL  0.1-1 mL Other Q1H PRN   1 mL at 09/18/19 0619     lidocaine patch in PLACE   Transdermal Q8H         mirtazapine (REMERON) tablet 30 mg  30 mg Oral At Bedtime   30 mg at 09/18/19 2059     sodium chloride (PF) 0.9% PF flush 3 mL  3 mL Intracatheter q1 min prn         traZODone (DESYREL) tablet 100 mg  100 mg Oral At Bedtime   100 mg at 09/18/19 2059     vortioxetine (TRINTELLIX/BRINTELLIX) tablet 20 mg  20 mg Oral Daily   20 mg at 09/19/19 0831     No current [facility] outpatient medications on file.         Allergies      No Known Allergies     Medical Review of Systems     /65   Pulse 69   Temp 97.6  F (36.4  C) (Oral)   Resp 16   Ht 1.676 m (5' 6\")   Wt " 100.3 kg (221 lb 1.6 oz)   SpO2 98%   BMI 35.69 kg/m    Body mass index is 35.69 kg/m .  A 10-point review of systems was performed by Zhang Madrigal MD and is negative, no new findings.      Psychiatric Examination     Appearance Sitting in chair, dressed in hospital scrubs. Appears stated age.   Attitude Cooperative   Orientation Oriented to person, place, time   Eye Contact Good   Speech Regular rate, rhythm, soft in volume and tone   Language Normal   Psychomotor Behavior Normal   Mood Less depressed    Affect Flat   Thought Process Goal-Oriented, Intact   Associations Intact   Thought Content Patient is currently negative for suicidal ideation, negative for plan or intent, able to contract no self harm and identify barriers to suicide.  Negative for obsessions, compulsions or psychosis.     Fund of Knowledge Appropriate    Insight Fair   Judgement Fair   Attention Span & Concentration Intact   Recent & Remote Memory Intact   Gait Normal   Muscle Tone Intact        Labs     Labs reviewed.  No results found for this or any previous visit (from the past 24 hour(s)).     Impression   This is a 33 year old female with previous psychiatric diagnoses that include major depression, generalized anxiety, and traits of borderline personality disorder. While meeting with Dr. Madrigal this morning, the patient proceeded to present flat in affect and depressed in mood. She denied experiencing any common side effects from the ECT session that occurred yesterday morning and continues to be in agreement with undergoing once a month maintenance ECT after this hospital stay. In addition to maintenance ECT, Dr. Madrigal believes patient would benefit most by attending [facility] once again after this hospital course. Patient very much in agreement with this plan.       Diagnoses   1. Major depression, recurrent, severe, without psychotic features.  2. Generalized Anxiety Disorder  3. Traits of Borderline  Personality Disorder     Plan     1. Explained side effects, benefits, and complications of medications to the patient, Pt gave verbal consent.  2. Medication changes: None   3. Discussed treatment plan with patient and team.  4. Projected length of stay: 7+ days  5. Maintenance ECT 1 month from 9/18/19  6. Patient would like to attend [facility] after hospitalization        Attestation:   I, Alessandra Chambers, am serving as a scribe on 9/19/2019 to document services personally performed by Zhang Madrigal MD based on my observations and the provider's statements to me.    Patient ID:  Name: Misty Parham    MRN: [id number]  Admission: 9/12/2019   YOB: 1985

## 2019-09-20 VITALS
OXYGEN SATURATION: 96 % | RESPIRATION RATE: 16 BRPM | WEIGHT: 221.1 LBS | DIASTOLIC BLOOD PRESSURE: 68 MMHG | HEART RATE: 80 BPM | TEMPERATURE: 97.7 F | SYSTOLIC BLOOD PRESSURE: 107 MMHG | BODY MASS INDEX: 35.53 KG/M2 | HEIGHT: 66 IN

## 2019-09-20 PROCEDURE — 25000132 ZZH RX MED GY IP 250 OP 250 PS 637: Performed by: PSYCHIATRY & NEUROLOGY

## 2019-09-20 RX ADMIN — BREXPIPRAZOLE 2 MG: 1 TABLET ORAL at 08:05

## 2019-09-20 RX ADMIN — ARIPIPRAZOLE 10 MG: 10 TABLET ORAL at 08:05

## 2019-09-20 RX ADMIN — VORTIOXETINE 20 MG: 20 TABLET, FILM COATED ORAL at 08:05

## 2019-09-20 NOTE — PROGRESS NOTES
[facility] Psychiatric Progress Note       Interim History   The patient's care was discussed with the treatment team and chart notes were reviewed. Pt seen on 9/20/19 by Dr. Madrigal. On interview, the patient reports she is doing well this morning. She denies any troubles with sleep, medications, or overall care while here on [address]. Dr. Madrigal again encourages the plan for patient to attend [facility] after this hospital stay. Patient in agreement with this, deeming the program to be helpful in the past. When questioned about her  and their situation, the patient reports she doesn't know why she is still  to him. They have been  and living separately since 2001. Dr. Madrigal encourages the patient to seek support in regards of  her .      Hospital Course   This is a 33 year old female with previous psychiatric diagnoses that include major depression, generalized anxiety, and traits of borderline personality disorder. She obtains four previous inpatient mental health hospitalizations, her most recent being approximately 3 months ago here at [facility]. Patient was presented to [facility] ED this time on 9/12/19 for suicidal ideation. She attains worsening symptoms of depression and suicidal ideation due to recently  from her spouse. While meeting with Dr. Madrigal this morning, the patient stressed obtaining life stressors that include finances, work, her son, and her relationship with her ex-. Dr. Lomax deemed the patient appropriate for ECT treatment at this time. She will undergo a maintenance session on 9/16/19. In addition to this, Dr. Madrigal would like to try the patient on the medication Rexulti. After reviewing this medication with patient in detail, the patient agrees to try this, thus Dr. Madrgial will start on a low dose of 1mg. Patient tolerated this medication well, however denied experiencing fewer  "symptoms of depression, thus Dr. Madrigal increased Rexulti dose to 2mg. Patient continued to report attaining a depressed mood, even with the increase om Rexulti, thus Dr. Madrigal scheduled patient for one maintenance ECT on 9/18/19. No complications with procedure. Patient will undergo maintenance once a month after hospital stay. In addition to maintenance ECT, Dr. Madrigal believed patient would benefit most by attending [facility] once again after this hospital course. Patient was very much in agreement with this plan.      Medications     Current Facility-Administered Medications Ordered in Epic   Medication Dose Route Frequency Last Rate Last Dose     acetaminophen (TYLENOL) tablet 650 mg  650 mg Oral Q6H PRN   650 mg at 09/18/19 2059     ARIPiprazole (ABILIFY) tablet 10 mg  10 mg Oral Daily   10 mg at 09/19/19 0831     brexpiprazole (REXULTI) tablet 2 mg  2 mg Oral Daily   2 mg at 09/19/19 0831     hydrOXYzine (ATARAX) tablet 25 mg  25 mg Oral TID PRN   25 mg at 09/16/19 0955     lactated ringers infusion  500 mL Intravenous Continuous 25 mL/hr at 09/18/19 0613       lidocaine 1 % 0.1-1 mL  0.1-1 mL Other Q1H PRN   1 mL at 09/18/19 0619     mirtazapine (REMERON) tablet 30 mg  30 mg Oral At Bedtime   30 mg at 09/19/19 2207     sodium chloride (PF) 0.9% PF flush 3 mL  3 mL Intracatheter q1 min prn         traZODone (DESYREL) tablet 100 mg  100 mg Oral At Bedtime   100 mg at 09/19/19 2207     vortioxetine (TRINTELLIX/BRINTELLIX) tablet 20 mg  20 mg Oral Daily   20 mg at 09/19/19 0831     No current [facility]-ordered outpatient medications on file.         Allergies      No Known Allergies     Medical Review of Systems     /76   Pulse 77   Temp 98.2  F (36.8  C) (Oral)   Resp 16   Ht 1.676 m (5' 6\")   Wt 100.3 kg (221 lb 1.6 oz)   SpO2 100%   BMI 35.69 kg/m    Body mass index is 35.69 kg/m .  A 10-point review of systems was performed by Zhang Madrigal MD and is negative, no new " findings.      Psychiatric Examination     Appearance Sitting in chair, dressed in hospital scrubs. Appears stated age.   Attitude Cooperative   Orientation Oriented to person, place, time   Eye Contact Good   Speech Regular rate, rhythm, soft in volume and tone   Language Normal   Psychomotor Behavior Normal   Mood Less depressed    Affect Flat   Thought Process Goal-Oriented, Intact   Associations Intact   Thought Content Patient is currently negative for suicidal ideation, negative for plan or intent, able to contract no self harm and identify barriers to suicide.  Negative for obsessions, compulsions or psychosis.     Fund of Knowledge Appropriate    Insight Fair   Judgement Fair   Attention Span & Concentration Intact   Recent & Remote Memory Intact   Gait Normal   Muscle Tone Intact        Labs     Labs reviewed.  No results found for this or any previous visit (from the past 24 hour(s)).     Impression   This is a 33 year old female with previous psychiatric diagnoses that include major depression, generalized anxiety, and traits of borderline personality disorder. Dr. Madrigal deemed patient safe and appropriate for discharge today. She will follow-up with Drew Juarez NP at [facility], attend [facility] Intensive outpatient program, and undergo once a month maintenance ECT.       Diagnoses   1. Major depression, recurrent, severe, without psychotic features.  2. Generalized Anxiety Disorder  3. Traits of Borderline Personality Disorder     Plan     1. Explained side effects, benefits, and complications of medications to the patient, Pt gave verbal consent.  2. Medication changes: None   3. Discussed treatment plan with patient and team.  4. Projected length of stay: Discharge today   5. Maintenance ECT 1 month from 9/18/19  6. Patient would like to attend [facility] after hospitalization        Attestation:   Alessandra LEBLANC, am serving as a scribe on 9/20/2019 to  document services personally performed by Zhang Madrigal MD based on my observations and the provider's statements to me.    Patient ID:  Name: Misty Parham    MRN: [id number]  Admission: 9/12/2019   YOB: 1985

## 2019-09-20 NOTE — DISCHARGE INSTRUCTIONS
Behavioral Discharge Planning and Instructions    Summary:  Admitted for depression with suicidal ideation.     Main Diagnosis:   Major depression, recurrent, severe, without psychotic features; Generalized Anxiety Disorder; Traits of Borderline Personality Disorder    Major Treatments, Procedures and Findings:  Psychiatric assessment. Medication adjustment. ECT.     Symptoms to Report: Losing more sleep, Mood getting worse or Thoughts of suicide    Lifestyle Adjustment:  Follow all treatment recommendations. Develop and follow safety plan. Your safety plan is your contract with yourself to keep you safe in a crisis. Be sure to use the resources and crisis numbers listed on your safety plan. Do not drive for at least one week following your last ECT treatment. If you have lingering memory issues or impaired judgment, do not drive until you have been cleared to do so by your physician.     Psychiatry Follow-up:     You have a follow up psychiatry appointment with Drew GARCIA at [facility] on Monday, September 23, 2019 at 1:40 pm. You will see Drew GARCIA weekly while you are in the [facility] Intensive Outpatient Program.  Please stop by [facility] today following discharge to  samples of Rexulti.     [facility]  [address], [address]  [place] [address]  Phone:  [phone] / Fax:  [phone]    You are being referred to the [facility] Intensive Outpatient Program at [facility]. Because you already see a therapist at [facility], you do not need to do an intake for the IOP. You can start the [facility] IOP on either Monday or Tuesday of next week, September 23rd or September 24th. The program meets Monday through Friday from 9:00 am until 12:00 pm.     [facility]  [address]  [place]  [address]  Phone:  [phone] / Fax:  [phone]    Resources:   Crisis Intervention:  829.519.7869 or 702-425-2500 (TTY: 103.500.9608).  Call anytime for help.  National [place] on Mental Illness (www.mn.davian.org): 817.915.4138 or 941-546-2999.  National Suicide Prevention Line (www.mentalhealthmn.org): 192-094-CYNK (6589)  COPE (Crisis Services for Adults) in [facility]: [phone] (Available 24/7)    General Medication Instructions:   See your medication sheet(s) for instructions.   Take all medicines as directed.  Make no changes unless your doctor suggests them.   Go to all your doctor visits.  Be sure to have all your required lab tests. This way, your medicines can be refilled on time.  Do not use any drugs not prescribed by your doctor.  Avoid alcohol.

## 2019-09-20 NOTE — DISCHARGE SUMMARY
[facility] Psychiatric Discharge Summary      DATE OF ADMISSION: 9/12/2019     DATE OF DISCHARGE: 9/20/19    PRIMARY CARE PHYSICIAN: Monse Chen    IDENTIFICATION:  For history, see dictation by Dr. Madrigal on 9/13/19. For physical summary, see dictation by Kimber Prince PA-C on 9/13/19.     HOSPITAL COURSE:   This is a 33 year old female with previous psychiatric diagnoses that include major depression, generalized anxiety, and traits of borderline personality disorder. She obtains four previous inpatient mental health hospitalizations, her most recent being approximately 3 months ago here at [facility]. Patient was presented to [facility] ED this time on 9/12/19 for suicidal ideation. She attains worsening symptoms of depression and suicidal ideation due to recently  from her spouse. While meeting with Dr. Madrigal this morning, the patient stressed obtaining life stressors that include finances, work, her son, and her relationship with her ex-. Dr. Lomax deemed the patient appropriate for ECT treatment at this time. She will undergo a maintenance session on 9/16/19. In addition to this, Dr. Madrigal would like to try the patient on the medication Rexulti. After reviewing this medication with patient in detail, the patient agrees to try this, thus Dr. Madrigal will start on a low dose of 1mg. Patient tolerated this medication well, however denied experiencing fewer symptoms of depression, thus Dr. Madrigal increased Rexulti dose to 2mg. Patient continued to report attaining a depressed mood, even with the increase in Rexulti, thus Dr. Madrigal scheduled patient for one maintenance ECT on 9/18/19. No complications with this procedure. Patient will undergo maintenance ECT once a month after hospital stay. In addition to maintenance ECT, Dr. Madrigal believed patient would benefit most by attending [facility] once again after this hospital course.  "Patient was very much in agreement with this plan. She was deemed safe and appropriate for discharge on 9/20.     DISCHARGE MENTAL STATUS EXAMINATION:     Appearance Sitting in chair, dressed in hospital scrubs. Appears stated age.   Attitude Cooperative   Orientation Oriented to person, place, time   Eye Contact Good   Speech Regular rate, rhythm, soft in volume and tone   Language Normal   Psychomotor Behavior Normal   Mood Less depressed    Affect Flat   Thought Process Goal-Oriented, Intact   Associations Intact   Thought Content Patient is currently negative for suicidal ideation, negative for plan or intent, able to contract no self harm and identify barriers to suicide.  Negative for obsessions, compulsions or psychosis.     Fund of Knowledge Appropriate    Insight Fair   Judgement Fair   Attention Span & Concentration Intact   Recent & Remote Memory Intact   Gait Normal   Muscle Tone Intact        LABORATORY DATA:    Refer to hospitalist admission dictation.  No results found for this or any previous visit (from the past 24 hour(s)).     /68   Pulse 80   Temp 97.7  F (36.5  C) (Oral)   Resp 16   Ht 1.676 m (5' 6\")   Wt 100.3 kg (221 lb 1.6 oz)   SpO2 96%   BMI 35.69 kg/m       DISCHARGE MEDICATIONS:      Review of your medicines      UNREVIEWED medicines. Ask your doctor about these medicines      Dose / Directions   ARIPiprazole 10 MG tablet  Commonly known as:  ABILIFY      Dose:  10 mg  Take 10 mg by mouth daily  Refills:  0     hydrOXYzine 25 MG tablet  Commonly known as:  ATARAX      Dose:  25 mg  Take 25 mg by mouth 3 times daily as needed for anxiety  Refills:  0     mirtazapine 30 MG tablet  Commonly known as:  REMERON      Dose:  30 mg  Take 30 mg by mouth At Bedtime  Refills:  0     traZODone 100 MG tablet  Commonly known as:  DESYREL      Dose:  100 mg  Take 100 mg by mouth At Bedtime  Refills:  0     vortioxetine 20 MG tablet  Commonly known as:  TRINTELLIX/BRINTELLIX      Dose:  20 " mg  Take 20 mg by mouth daily  Refills:  0            DISCHARGE DIAGNOSES:  1. Major depression, recurrent, severe, without psychotic features.  2. Borderline Personality Disorder     DISCHARGE PLAN:   1. Continue medications: Rexulti 2mg daily, Trintellix 20mg daily, Abilify 10mg daily, Remeron 30mg at bedtime, and Trazodone 100mg at bedtime   2. Patient will stop by [facility] after discharge for Rexulti samples   3. Patient will follow-up with Drew Juarez NP at [facility] for psychiatric care  4. Patient will attend [facility] Intensive Outpatient program   5. Will undergo once a month maintenance ECT here at [facility]     DISCHARGE FOLLOW-UP:  See Reconciliation Note     Attestation:   DEANA, Alessandra Chambers, am serving as a scribe on 9/20/2019 to document services personally performed by Zhang Madrigal MD based on my observations and the provider's statements to me.      Patient ID:    Name: Misty Parham MRN: [id number]  Admission: 9/12/2019  YOB: 1985

## 2019-09-20 NOTE — PROGRESS NOTES
met with patient to review discharge plans with her. She confirmed that she is interested in going to the [facility] at [facility] following discharge.  called and left a message with the IOP  regarding setting up an intake appointment. Patient to follow up with Drew GARCIA at [facility] for medication management.  reviewed safety plan with patient and encouraged her to complete it. Patient denies any thoughts of suicide or self harm at this time.      received a call back from Hilary in scheduling at [facility] ([phone]). She stated that because patient already sees a therapist at [facility], she does not need to do an intake for the [facility]. Patient can start the IOP either Monday or Tuesday of next week.  met with patient to notify her of this update and she will decide which day she wants to start the program.

## 2019-10-07 ENCOUNTER — HOSPITAL ENCOUNTER (INPATIENT)
Facility: CLINIC | Age: 34
LOS: 16 days | Discharge: HOME | End: 2019-10-23
Attending: EMERGENCY MEDICINE | Admitting: PSYCHIATRY & NEUROLOGY
Payer: COMMERCIAL

## 2019-10-07 ENCOUNTER — HOSPITAL ENCOUNTER (INPATIENT)
Age: 34
End: 2019-10-07
Payer: COMMERCIAL

## 2019-10-07 DIAGNOSIS — Z91.148 NONCOMPLIANCE WITH MEDICATION REGIMEN: ICD-10-CM

## 2019-10-07 DIAGNOSIS — R45.851 SUICIDAL IDEATION: ICD-10-CM

## 2019-10-07 DIAGNOSIS — F33.2 SEVERE EPISODE OF RECURRENT MAJOR DEPRESSIVE DISORDER, WITHOUT PSYCHOTIC FEATURES (H): Primary | ICD-10-CM

## 2019-10-07 LAB
AMPHETAMINES UR QL SCN: NEGATIVE
BARBITURATES UR QL: NEGATIVE
BENZODIAZ UR QL: NEGATIVE
CANNABINOIDS UR QL SCN: NEGATIVE
COCAINE UR QL: NEGATIVE
HCG UR QL: NEGATIVE
OPIATES UR QL SCN: NEGATIVE
PCP UR QL SCN: NEGATIVE

## 2019-10-07 PROCEDURE — 12400000 ZZH R&B MH

## 2019-10-07 PROCEDURE — 81025 URINE PREGNANCY TEST: CPT | Performed by: EMERGENCY MEDICINE

## 2019-10-07 PROCEDURE — 80307 DRUG TEST PRSMV CHEM ANLYZR: CPT | Performed by: EMERGENCY MEDICINE

## 2019-10-07 PROCEDURE — 25000132 ZZH RX MED GY IP 250 OP 250 PS 637: Performed by: PSYCHIATRY & NEUROLOGY

## 2019-10-07 PROCEDURE — 99285 EMERGENCY DEPT VISIT HI MDM: CPT | Mod: 25

## 2019-10-07 RX ORDER — HYDROXYZINE HYDROCHLORIDE 25 MG/1
25 TABLET, FILM COATED ORAL 3 TIMES DAILY PRN
Status: DISCONTINUED | OUTPATIENT
Start: 2019-10-07 | End: 2019-10-23 | Stop reason: HOSPADM

## 2019-10-07 RX ORDER — TRAZODONE HYDROCHLORIDE 100 MG/1
100-200 TABLET ORAL AT BEDTIME
Status: DISCONTINUED | OUTPATIENT
Start: 2019-10-07 | End: 2019-10-23 | Stop reason: HOSPADM

## 2019-10-07 RX ORDER — DIPHENHYDRAMINE HCL 25 MG
TABLET ORAL PRN
Status: ON HOLD | COMMUNITY
End: 2019-10-23

## 2019-10-07 RX ORDER — IBUPROFEN 200 MG
TABLET ORAL PRN
Status: ON HOLD | COMMUNITY
End: 2019-10-23

## 2019-10-07 RX ORDER — MIRTAZAPINE 45 MG/1
45 TABLET, FILM COATED ORAL AT BEDTIME
Status: DISCONTINUED | OUTPATIENT
Start: 2019-10-07 | End: 2019-10-23 | Stop reason: HOSPADM

## 2019-10-07 RX ORDER — IBUPROFEN 200 MG
200 TABLET ORAL EVERY 6 HOURS PRN
Status: DISCONTINUED | OUTPATIENT
Start: 2019-10-07 | End: 2019-10-23 | Stop reason: HOSPADM

## 2019-10-07 RX ADMIN — TRAZODONE HYDROCHLORIDE 200 MG: 100 TABLET ORAL at 22:02

## 2019-10-07 RX ADMIN — MIRTAZAPINE 45 MG: 45 TABLET, FILM COATED ORAL at 22:02

## 2019-10-07 ASSESSMENT — ACTIVITIES OF DAILY LIVING (ADL)
COGNITION: 0 - NO COGNITION ISSUES REPORTED
DRESS: INDEPENDENT
RETIRED_COMMUNICATION: 0-->UNDERSTANDS/COMMUNICATES WITHOUT DIFFICULTY
TOILETING: 0-->INDEPENDENT
SWALLOWING: 0-->SWALLOWS FOODS/LIQUIDS WITHOUT DIFFICULTY
AMBULATION: 0-->INDEPENDENT
LAUNDRY: WITH SUPERVISION
TRANSFERRING: 0-->INDEPENDENT
RETIRED_EATING: 0-->INDEPENDENT
ORAL_HYGIENE: INDEPENDENT
HYGIENE/GROOMING: INDEPENDENT
FALL_HISTORY_WITHIN_LAST_SIX_MONTHS: NO
DRESS: 0-->INDEPENDENT
BATHING: 0-->INDEPENDENT

## 2019-10-07 ASSESSMENT — MIFFLIN-ST. JEOR
SCORE: 1734.15
SCORE: 1711.47

## 2019-10-07 NOTE — PROVIDER NOTIFICATION
10/07/19 1842   Patient Belongings   Did you bring any home meds/supplements to the hospital?  Yes   Disposition of meds  Other (see comment)   Patient Belongings locker   Patient Belongings Put in Hospital Secure Location (Security or Locker, etc.) other (see comments)   Belongings Search Yes   Clothing Search Yes   Second Staff Delia     [place] on blue lanyard  Ear buds  Cell phone   Nail clipper  miscellaneous papers   Loose change   Pens   Visa- 1073  VISA -[id number]  [employer] - [phone]  Drivers License   Blue cross blue shield   AAA card   [employer]edy rewards   Green harvest gift card   Black shoes   Green jacket   Blue sports bra  Teal crew socks  Teal t-shirt   Black pants    Security envelope number [id number]

## 2019-10-07 NOTE — PHARMACY-ADMISSION MEDICATION HISTORY
Admission medication history interview status for the 10/7/2019  admission is complete. See EPIC admission navigator for prior to admission medications     Medication history source reliability:Moderate    Actions taken by pharmacist (provider contacted, etc): spoke to pt     Additional medication history information not noted on PTA med list :None    Medication reconciliation/reorder completed by provider prior to medication history? No    Time spent in this activity: 10 minutes    Prior to Admission medications    Medication Sig Last Dose Taking? Auth Provider   brexpiprazole (REXULTI) 2 MG tablet Take 1 tablet (2 mg) by mouth daily Past Week at Unknown time Yes Zhang Madrigal MD   diphenhydrAMINE (BENADRYL) 25 MG tablet Take by mouth as needed for itching or allergies prn med Yes Unknown, Entered By History   hydrOXYzine (ATARAX) 25 MG tablet Take 25 mg by mouth 3 times daily as needed for anxiety prn med Yes Unknown, Entered By History   ibuprofen (ADVIL/MOTRIN) 200 MG tablet Take by mouth as needed for mild pain prn med Yes Unknown, Entered By History   mirtazapine (REMERON) 30 MG tablet Take 45 mg by mouth At Bedtime  10/6/2019 at Unknown time Yes Unknown, Entered By History   traZODone (DESYREL) 100 MG tablet Take 100-200 mg by mouth At Bedtime  10/6/2019 at Unknown time Yes Unknown, Entered By History   vortioxetine (TRINTELLIX/BRINTELLIX) 20 MG tablet Take 20 mg by mouth daily Past Week at Unknown time Yes Unknown, Entered By History   Genevieve Lyons, KeilaD

## 2019-10-07 NOTE — ED TRIAGE NOTES
Pt went to [facility].  She has been off her meds for week.  She is very depressed and feels suicidal.  Her Psychiatrist sent her here.

## 2019-10-07 NOTE — PLAN OF CARE
Admit patient from [facility] and  three previous admissions this year. Has been feeling increasing suicidal with thoughts of over dosing on medication. Stressor include separation from  and financial. Will contract for safety and continue to monitor closely.  Nursing assessment complete including patient and medication profiles. Risk assessments completed addressing suicide,fall,skin,nutrition and safety issues. Care plan initiated. Assessments reviewed with physician and admit orders received. Video monitoring in progress, Patient Informed. Welcome packet reviewed with patient. Information reviewed includes getting emergency help, preventing infections, understanding your care, using medication safely, reducing falls, preventing pressure ulcers, smoking cessation, powerful choices and Patients Bill of Rights. Pt. given tour of the unit and instruction on use of facility including emergency call light. Program schedule reviewed with patient. Questions regarding the unit addressed. Pt. Search completed and belongings inventoried.

## 2019-10-07 NOTE — ED PROVIDER NOTES
"  History     Chief Complaint:  Depression      HPI   Misty Parham is a 34 year old female, with a history of depression and suicidal ideation, who presents to the ED via EMS for evaluation of suicidal ideation. The patient presents here from [facility] per request from her psychiatrist. The patient reports that she has been off of her medications for one week because she forgets to take them. She reports that she is very depressed and suicidal, and has a plan to overdose on Trazodone. The patient reports that she has been taking some Benadryl, including 200 mg around 0700 this morning to help her sleep. The patient was sent here for direct admit because she was unable to contract for safety in clinic, but there are no beds currently available so she came to the ED instead.      Allergies:  The patient has no known drug allergies.    Medications:    Abilify  Rexulti  Atarax  Remeron  Desyrel  Trintellix    Past Medical History:    Depressive disorder  Suicidal ideation    Past Surgical History:    Cholecystectomy    Family History:    No past pertinent family history.    Social History:  Negative for tobacco use.  Negative for alcohol use.  Negative for drug use.  Marital Status:  Legally  [3]     Review of Systems   All other systems reviewed and are negative.    Physical Exam     Patient Vitals for the past 24 hrs:   BP Temp Temp src Heart Rate Resp SpO2 Height Weight   10/07/19 1200 121/73 98.9  F (37.2  C) Oral 87 16 99 % 1.651 m (5' 5\") 104.3 kg (230 lb)     Physical Exam  General: Nontoxic appearing woman sitting upright in room BH3, using phone  HENT: mucous membranes moist  Eyes: PERRL without nystagmus  CV: extremities well perfused, regular rhythm  Resp:  normal effort, clear throughout  GI: abdomen soft and nontender, no guarding  MSK: no bony tenderness   Skin: appropriately warm and dry  Neuro: alert, clear speech, oriented, normal tone in extremities, ambulatory  Psych:  calm, " cooperative, poor eye contact, reports feeling suicidal though seems quite indifferent to circumstances and her symptoms, no evidence of hallucinations      Emergency Department Course   Laboratory:  Drug abuse screen 77 urine: negative for all.  HCG qualitative urine: negative    Emergency Department Course:  Nursing notes and vitals reviewed. (1230) I performed an exam of the patient as documented above.     Urine collected. This was sent to the lab for further testing, results above.     1445 I rechecked the patient. The patient was placed on a 72 hour hold.     Findings and plan explained to the Patient who consents to admission.      The patient was accepted by Dr. Madrigal, who will admit the patient to an inpatient psych bed for further monitoring, evaluation, and treatment.     Impression & Plan    Medical Decision Making:  She was referred here by her psychiatric team for hospitalization.  Medical precipitants were considered but are not suspected.  She is cooperative here other than not being in favor of hospitalization, so a 72-hour hold was initiated.  I do not think she requires further emergent work-up.  She will be admitted to Dr. Madrigal of [facility] psychiatry.        Diagnosis:    ICD-10-CM    1. Suicidal ideation R45.851 Drug abuse screen 77 urine (FL, RH, SH)     HCG qualitative urine   2. Noncompliance with medication regimen Z91.14        Disposition:  The patient was admitted.    Scribe Disclosure:  I, Francia Munguia, am serving as a scribe on 10/7/2019 at 12:30 PM to personally document services performed by Truong Geiger MD based on my observations and the provider's statements to me.     This record was created at least in part using electronic voice recognition software, so please excuse any typographical errors.    Francia Munguia  10/7/2019    EMERGENCY DEPARTMENT       Truong Geiger MD  10/07/19 1938

## 2019-10-08 LAB
ANION GAP SERPL CALCULATED.3IONS-SCNC: 4 MMOL/L (ref 3–14)
BASOPHILS # BLD AUTO: 0 10E9/L (ref 0–0.2)
BASOPHILS NFR BLD AUTO: 0.4 %
BUN SERPL-MCNC: 14 MG/DL (ref 7–30)
CALCIUM SERPL-MCNC: 9.2 MG/DL (ref 8.5–10.1)
CHLORIDE SERPL-SCNC: 109 MMOL/L (ref 94–109)
CO2 SERPL-SCNC: 28 MMOL/L (ref 20–32)
CREAT SERPL-MCNC: 1 MG/DL (ref 0.52–1.04)
DIFFERENTIAL METHOD BLD: ABNORMAL
EOSINOPHIL # BLD AUTO: 0.2 10E9/L (ref 0–0.7)
EOSINOPHIL NFR BLD AUTO: 2.6 %
ERYTHROCYTE [DISTWIDTH] IN BLOOD BY AUTOMATED COUNT: 15.1 % (ref 10–15)
GFR SERPL CREATININE-BSD FRML MDRD: 72 ML/MIN/{1.73_M2}
GLUCOSE SERPL-MCNC: 95 MG/DL (ref 70–99)
HCT VFR BLD AUTO: 40.7 % (ref 35–47)
HGB BLD-MCNC: 13.1 G/DL (ref 11.7–15.7)
IMM GRANULOCYTES # BLD: 0 10E9/L (ref 0–0.4)
IMM GRANULOCYTES NFR BLD: 0.1 %
LYMPHOCYTES # BLD AUTO: 1.7 10E9/L (ref 0.8–5.3)
LYMPHOCYTES NFR BLD AUTO: 22.9 %
MCH RBC QN AUTO: 28.8 PG (ref 26.5–33)
MCHC RBC AUTO-ENTMCNC: 32.2 G/DL (ref 31.5–36.5)
MCV RBC AUTO: 90 FL (ref 78–100)
MONOCYTES # BLD AUTO: 0.8 10E9/L (ref 0–1.3)
MONOCYTES NFR BLD AUTO: 10.4 %
NEUTROPHILS # BLD AUTO: 4.8 10E9/L (ref 1.6–8.3)
NEUTROPHILS NFR BLD AUTO: 63.6 %
NRBC # BLD AUTO: 0 10*3/UL
NRBC BLD AUTO-RTO: 0 /100
PLATELET # BLD AUTO: 268 10E9/L (ref 150–450)
POTASSIUM SERPL-SCNC: 4 MMOL/L (ref 3.4–5.3)
RBC # BLD AUTO: 4.55 10E12/L (ref 3.8–5.2)
SODIUM SERPL-SCNC: 141 MMOL/L (ref 133–144)
WBC # BLD AUTO: 7.6 10E9/L (ref 4–11)

## 2019-10-08 PROCEDURE — 25000132 ZZH RX MED GY IP 250 OP 250 PS 637: Performed by: PSYCHIATRY & NEUROLOGY

## 2019-10-08 PROCEDURE — 80048 BASIC METABOLIC PNL TOTAL CA: CPT | Performed by: PSYCHIATRY & NEUROLOGY

## 2019-10-08 PROCEDURE — 85025 COMPLETE CBC W/AUTO DIFF WBC: CPT | Performed by: PSYCHIATRY & NEUROLOGY

## 2019-10-08 PROCEDURE — 12400000 ZZH R&B MH

## 2019-10-08 PROCEDURE — 36415 COLL VENOUS BLD VENIPUNCTURE: CPT | Performed by: PSYCHIATRY & NEUROLOGY

## 2019-10-08 RX ADMIN — BREXPIPRAZOLE 2 MG: 1 TABLET ORAL at 08:51

## 2019-10-08 RX ADMIN — VORTIOXETINE 20 MG: 20 TABLET, FILM COATED ORAL at 08:51

## 2019-10-08 RX ADMIN — TRAZODONE HYDROCHLORIDE 100 MG: 100 TABLET ORAL at 20:52

## 2019-10-08 RX ADMIN — MIRTAZAPINE 45 MG: 45 TABLET, FILM COATED ORAL at 20:52

## 2019-10-08 ASSESSMENT — ACTIVITIES OF DAILY LIVING (ADL)
DRESS: INDEPENDENT
HYGIENE/GROOMING: INDEPENDENT
DRESS: INDEPENDENT
LAUNDRY: WITH SUPERVISION
ORAL_HYGIENE: INDEPENDENT
HYGIENE/GROOMING: INDEPENDENT
LAUNDRY: WITH SUPERVISION
ORAL_HYGIENE: INDEPENDENT

## 2019-10-08 ASSESSMENT — MIFFLIN-ST. JEOR: SCORE: 1702.4

## 2019-10-08 NOTE — PLAN OF CARE
Pt has aflat affect. States depressed due to not seeing children will be here for ect starting tommarow.Did not do well when went home for outpatient ect so will stay in this time.Slept well  Participated in groups but flat

## 2019-10-08 NOTE — PROGRESS NOTES
10/08/19 1200   General Information   Has Not Attended OT as of: 10/08/19     Pt has not attended OT since admit.  Will continue to encourage participation and completion of  self-assessment as able. OT staff will explain the purpose of being involved with treatment plan and provide options to meet current needs and goals.

## 2019-10-08 NOTE — H&P
[facility] H&P Note       Initial History     The patient's care was discussed with the treatment team and chart notes were reviewed.     Patient examined for psychiatric admission.     IDENTIFICATION  Patient is a 34 year old female who resides in [place], [place]. She follows Drew Juarez NP for psychiatric care at [facility]. Pt sees PCP Monse Wilde. Pt seen on 10/8/19 by Dr. Madrigal.    CHIEF COMPLAINT  Increased symptoms of depression and suicidal ideation.      HISTORY OF PRESENT ILLNESS  This is a 33 year old female with a previous psychiatric diagnoses that include major depression, generalized anxiety, and traits of borderline personality disorder. She obtains four previous inpatient mental health hospitalizations, her most recent being less than 1 month ago here at [facility] (9/13/19-9/20/19). The patient was presented to [facility] ED on 10/07/19 from [facility] for evaluation of suicidal ideation. Patient had apparently admitted that she had not been taking her psychiatric medications appropriately. She had been on and off them for a total of one week due to forgetting to take them. Patient noted that she had planned to overdose on Trazodone. She was deemed appropriate for inpatient level of care. On interview with Dr. Madrigal, the patient reports she isn't doing too well this morning. Dr. Madrigal informs the patient that her psychiatrist, Drew Juarez, would like the patient to under a series of ECT. Patient ok with this decision. She will undergo her first session tomorrow morning and complete the series as inpatient. In addition to this, the patient is encouraged by Dr. Madrigal to start attending group sessions and spending more time outside of her room. Patient acknowledges this.     CHEMICAL DEPENDENCY HISTORY  Patient does not obtain a chemical dependency history. She has never undergone chemical dependency treatment. Denies  "illicit drug use.     PAST  PSYCHIATRIC HISTORY  The patient obtains four previous inpatient mental health hospitalizations. Three here at [facility] and another at [facility] in 2015. Her most recent admission was here at [facility] from 6/20/19 to 7/03/19. She has followed Drew Juarez NP through [facility] for psychiatric care. Prior to admission medications include: Abilify, Remeron, and Atarax. Previous psychiatric medications include: Wellbutrin, Hydroxyzine, Trintellix, and Zoloft     FAMILY HISTORY  Patient believes that her mother had depression and had issues with alcohol. Her biological father emily had issues with mental illness and chemicals. Her maternal aunt likely has depression and chemical use as well. Her eldest son has started exhibiting the same symptoms as the patient. She states that her family is \"anti-doctor.\"    SOCIAL HISTORY  The patient was raised as one of three children before her parents got a divorce. Her oldest son is 16-years-old, her step daughter is 21-years-old and her youngest son is 13-years-old.  Her oldest son has been living with her sister in [place].  She has been living with her mother since she has been  with her  (5-6 years).  She has one adopted brother. She declares that her family was supportive and caring throughout her childhood. She graduated high school and did not obtain further education. Patient was  to her  from [facility] for 15 years, however they have  (have been for the past 5-6 years). The couple has biological child together. Her ex- provides little support in regards of their children. 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      Medications     Medications Prior to Admission   Medication Sig Dispense Refill Last Dose     brexpiprazole (REXULTI) 2 MG tablet Take 1 tablet (2 mg) by mouth daily   Past Week at Unknown time     diphenhydrAMINE (BENADRYL) 25 MG " "tablet Take by mouth as needed for itching or allergies   prn med     hydrOXYzine (ATARAX) 25 MG tablet Take 25 mg by mouth 3 times daily as needed for anxiety   prn med     ibuprofen (ADVIL/MOTRIN) 200 MG tablet Take by mouth as needed for mild pain   prn med     mirtazapine (REMERON) 30 MG tablet Take 45 mg by mouth At Bedtime    10/6/2019 at Unknown time     traZODone (DESYREL) 100 MG tablet Take 100-200 mg by mouth At Bedtime    10/6/2019 at Unknown time     vortioxetine (TRINTELLIX/BRINTELLIX) 20 MG tablet Take 20 mg by mouth daily   Past Week at Unknown time       Scheduled Medications:    brexpiprazole  2 mg Oral Daily     mirtazapine  45 mg Oral At Bedtime     traZODone  100-200 mg Oral At Bedtime     vortioxetine  20 mg Oral Daily     PRNs:  hydrOXYzine, ibuprofen      Allergies      No Known Allergies     Previous Medical History     Past Medical History:   Diagnosis Date     Depressive disorder         Medical Review of Systems     /78   Pulse 86   Temp 97.9  F (36.6  C) (Oral)   Resp 18   Ht 1.651 m (5' 5\")   Wt 102.1 kg (225 lb)   SpO2 99%   BMI 37.44 kg/m    Body mass index is 37.44 kg/m .    Previous 10-point ROS completed by Truong Geiger MD on 10/07/19 reviewed by Zhang Madrigal MD on October 8, 2019 and is unchanged except for those problems mentioned within the HPI.     Mental Status Examination     Appearance Sitting in chair, dressed in hospital scrubs. Appears stated age.   Attitude Cooperative   Orientation Oriented to person, place, time   Eye Contact Fair    Speech Regular rate, rhythm, volume and tone   Language Normal   Psychomotor Behavior Normal   Mood Depressed   Affect Depressed and flat    Thought Process Goal-Oriented, Intact   Associations Intact   Thought Content Patient is currently negative for suicidal ideation, negative for plan or intent, able to contract no self harm and identify barriers to suicide.  Negative for obsessions, compulsions or " psychosis.     Fund of Knowledge Intact   Insight Poor   Judgement Poor   Attention Span & Concentration Intact   Recent & Remote Memory Intact   Gait Normal   Muscle Tone Intact      Labs     Labs reviewed.  Recent Results (from the past 24 hour(s))   Drug abuse screen 77 urine (FL, RH, SH)    Collection Time: 10/07/19  1:03 PM   Result Value Ref Range    Amphetamine Qual Urine Negative NEG^Negative    Barbiturates Qual Urine Negative NEG^Negative    Benzodiazepine Qual Urine Negative NEG^Negative    Cannabinoids Qual Urine Negative NEG^Negative    Cocaine Qual Urine Negative NEG^Negative    Opiates Qualitative Urine Negative NEG^Negative    PCP Qual Urine Negative NEG^Negative   HCG qualitative urine    Collection Time: 10/07/19  1:03 PM   Result Value Ref Range    HCG Qual Urine Negative NEG^Negative          Impression   This is a 33 year old female with previous psychiatric diagnoses that include major depression, generalized anxiety, and traits of borderline personality disorder. She obtains four previous inpatient mental health hospitalizations, her most recent being less than 1 month ago here at [facility]. Patient was presented to [facility] ED on 10/07/19 from [facility] for evaluation of suicidal ideation. Prior to admission, the patient had been on and off of her psychiatric medications. Her symptoms of depression worsened and she had a plan to overdose on Trazodone. Patient was deemed appropriate for inpatient level of care for safety and further stabilization. While meeting with Dr. Madrigal this morning, the patient appeared severely depressed in mood and flat in affect. She was informed that her outpatient psychiatrist believes that she would benefit most from a series of ECT while inpatient. Dr. Madrigal agreed with this, thus will have patient start a series of bilateral ECT tomorrow morning, 10/09/19. In addition to this, the patient was highly encouraged to attend group sessions and spend  less time isolative to her room.      Diagnoses   1. Major depression, recurrent, severe, without psychotic features.  2. Generalized Anxiety Disorder  3. Traits of Borderline Personality Disorder     Plan     1. Explained side effects, benefits, and complications of medications to the patient, Pt gave verbal consent.  2. Medication changes: Increased Rexulti dose to 3mg daily  3. Discussed treatment plan with patient and team.  4. Projected length of stay: 7+ days   5. ECT #1 on 10/09/19  6. Encourage the patient to attend group sessions and stay out of her room       Attestation:   I, Alessandra Chambers, am serving as a scribe on 10/8/2019 to document services personally performed by Zhang Madrigal MD based on my observations and the provider's statements to me.    Patient ID:  Name: Misty Parham MRN: [id number]  Admission: 10/7/2019 YOB: 1985

## 2019-10-09 ENCOUNTER — ANESTHESIA (OUTPATIENT)
Dept: SURGERY | Facility: CLINIC | Age: 34
End: 2019-10-09

## 2019-10-09 ENCOUNTER — ANESTHESIA EVENT (OUTPATIENT)
Dept: SURGERY | Facility: CLINIC | Age: 34
End: 2019-10-09

## 2019-10-09 PROCEDURE — 25000125 ZZHC RX 250: Performed by: NURSE ANESTHETIST, CERTIFIED REGISTERED

## 2019-10-09 PROCEDURE — 25800030 ZZH RX IP 258 OP 636: Performed by: ANESTHESIOLOGY

## 2019-10-09 PROCEDURE — 25000128 H RX IP 250 OP 636: Performed by: NURSE ANESTHETIST, CERTIFIED REGISTERED

## 2019-10-09 PROCEDURE — 25000132 ZZH RX MED GY IP 250 OP 250 PS 637: Performed by: PSYCHIATRY & NEUROLOGY

## 2019-10-09 PROCEDURE — 25000128 H RX IP 250 OP 636: Performed by: PSYCHIATRY & NEUROLOGY

## 2019-10-09 PROCEDURE — 90870 ELECTROCONVULSIVE THERAPY: CPT

## 2019-10-09 PROCEDURE — 12400000 ZZH R&B MH

## 2019-10-09 PROCEDURE — GZB2ZZZ ELECTROCONVULSIVE THERAPY, BILATERAL-SINGLE SEIZURE: ICD-10-PCS | Performed by: PSYCHIATRY & NEUROLOGY

## 2019-10-09 RX ORDER — KETOROLAC TROMETHAMINE 30 MG/ML
30 INJECTION, SOLUTION INTRAMUSCULAR; INTRAVENOUS EVERY 6 HOURS PRN
Status: DISCONTINUED | OUTPATIENT
Start: 2019-10-09 | End: 2019-10-11

## 2019-10-09 RX ORDER — SODIUM CHLORIDE, SODIUM LACTATE, POTASSIUM CHLORIDE, CALCIUM CHLORIDE 600; 310; 30; 20 MG/100ML; MG/100ML; MG/100ML; MG/100ML
500 INJECTION, SOLUTION INTRAVENOUS CONTINUOUS
Status: DISCONTINUED | OUTPATIENT
Start: 2019-10-09 | End: 2019-10-14

## 2019-10-09 RX ADMIN — VORTIOXETINE 20 MG: 20 TABLET, FILM COATED ORAL at 09:09

## 2019-10-09 RX ADMIN — METHOHEXITAL SODIUM 100 MG: 500 INJECTION, POWDER, LYOPHILIZED, FOR SOLUTION INTRAMUSCULAR; INTRAVENOUS; RECTAL at 08:07

## 2019-10-09 RX ADMIN — KETOROLAC TROMETHAMINE 30 MG: 30 INJECTION, SOLUTION INTRAMUSCULAR at 08:05

## 2019-10-09 RX ADMIN — SODIUM CHLORIDE, SODIUM LACTATE, POTASSIUM CHLORIDE, CALCIUM CHLORIDE 500 ML: 600; 310; 30; 20 INJECTION, SOLUTION INTRAVENOUS at 08:07

## 2019-10-09 RX ADMIN — TRAZODONE HYDROCHLORIDE 200 MG: 100 TABLET ORAL at 20:48

## 2019-10-09 RX ADMIN — SODIUM CHLORIDE, SODIUM LACTATE, POTASSIUM CHLORIDE, CALCIUM CHLORIDE: 600; 310; 30; 20 INJECTION, SOLUTION INTRAVENOUS at 08:03

## 2019-10-09 RX ADMIN — MIRTAZAPINE 45 MG: 45 TABLET, FILM COATED ORAL at 20:48

## 2019-10-09 RX ADMIN — SUCCINYLCHOLINE CHLORIDE 100 MG: 20 INJECTION, SOLUTION INTRAMUSCULAR; INTRAVENOUS; PARENTERAL at 08:07

## 2019-10-09 RX ADMIN — BREXPIPRAZOLE 3 MG: 1 TABLET ORAL at 09:09

## 2019-10-09 ASSESSMENT — ACTIVITIES OF DAILY LIVING (ADL)
LAUNDRY: UNABLE TO COMPLETE
HYGIENE/GROOMING: INDEPENDENT;PROMPTS
ORAL_HYGIENE: PROMPTS;INDEPENDENT
HYGIENE/GROOMING: INDEPENDENT
DRESS: SCRUBS (BEHAVIORAL HEALTH);INDEPENDENT;PROMPTS
ORAL_HYGIENE: INDEPENDENT
LAUNDRY: WITH SUPERVISION
DRESS: INDEPENDENT

## 2019-10-09 ASSESSMENT — LIFESTYLE VARIABLES: TOBACCO_USE: 0

## 2019-10-09 NOTE — H&P
Social History        Reason for Admission:  Patient is 34 year old female admitted to [facility]'s Inpatient Mental Health Unit on 10/7/2019.  Patient stated she had been suicidal for a couple of weeks and the day of admission she was in to see her psychiatric nurse where she verbalized her thoughts.  Her nurse recommended she come to the hospital for a course of ECT.         Previous Mental & Chemical Dependency History:  This is patient's fifth mental health hospitalization.  She was hospitalized here in February of 2018 and at [facility] in March of 2018, back here in June/July of 2019 and again from 9/13-20 of 2019.Patient sees Drew Juarez RN, [facility] for medication management. She has a therapist, Eli at [facility], whom she hasn't seen since October 2018.  Patient rarely drinks alcohol and uses no other chemicals.  She drinks a pop a day, does not smoke or roman.  No history of chemical dependency treatment.     Social History:  Patient was born in [place] and grew up in [place].  Patient's parents  when she was 4 years old.  Patient rarely sees her father.  Patient's mother is an alcoholic.  She also has a maternal aunt who is an alcoholic.  Patient is the third of 4 siblings, all sisters.  Patient and her  have been  for 17 years.  They have been  for 6 of those years.   Patient and spouse have two sons, ages 16 ( lives with his mother's sister in [place]) and 13.  Patient also has a 21 year old step daughter.       Education and Work History:  Patient graduated from high school and has taken some college courses.  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  She is still employed working full time at [employer] but has changed positions and now stocks shelves.  She does have health insurance.       Living Situation:  Patient and 2 of her children live with her mother in [place].       Legal Issues:  None.     Significant Life Events: None.     Muslim:  Patient grew up [employer], but converted to Nond[employer] when she  her .        History:  None.      Discharge Considerations:  Patient's support system includes her mother and her outpatient providers.  Social Service will remain available to assist with discharge needs.

## 2019-10-09 NOTE — PLAN OF CARE
"Patient spent shift in her room bed resting.  Her  visited. Visit went well.  Neglected grooming. Calm and cooperative with staff. Med compliant. Denies SI. States she is \"Just feeling sad\" Mood is sad. Affect is depressed, flat and blunt.  "

## 2019-10-09 NOTE — ANESTHESIA POSTPROCEDURE EVALUATION
Patient: Misty Parham    * No procedures listed *    Diagnosis:* No pre-op diagnosis entered *  Diagnosis Additional Information: No value filed.    Anesthesia Type:  General    Note:  Anesthesia Post Evaluation    Patient location during evaluation: bedside  Patient participation: Able to fully participate in evaluation  Level of consciousness: awake and alert  Pain management: adequate  Airway patency: patent  Cardiovascular status: acceptable  Respiratory status: acceptable  Hydration status: acceptable  PONV: none and controlled     Anesthetic complications: None          Last vitals:  Vitals:    10/09/19 0735 10/09/19 0800 10/09/19 0816   BP: 117/80  122/67   Pulse: 76 90 87   Resp: 16 15 19   Temp: 36.7  C (98.1  F)     SpO2: 96% 97% 93%         Electronically Signed By: Bryce Argueta MD  October 9, 2019  8:21 AM

## 2019-10-09 NOTE — PLAN OF CARE
Problem: Suicidal Behavior  Goal: Suicidal Behavior is Absent or Managed  Outcome: No Change  Note:   Pt presents with flat affect and depressed mood. Pt thought process and insight impaired. Pt has been bed resting or napping since coming back from ECT this morning. Pt has been med compliant and cooperative with staff. Denies Si.

## 2019-10-09 NOTE — ANESTHESIA PREPROCEDURE EVALUATION
Anesthesia Pre-Procedure Evaluation    Patient: Mitsy Parham   MRN: [id number] : 1985          Preoperative Diagnosis: * No surgery found *        Past Medical History:   Diagnosis Date     Depressive disorder      Past Surgical History:   Procedure Laterality Date     CHOLECYSTECTOMY         Anesthesia Evaluation     . Pt has had prior anesthetic.     No history of anesthetic complications          ROS/MED HX    ENT/Pulmonary:      (-) tobacco use, asthma and sleep apnea   Neurologic:       Cardiovascular:         METS/Exercise Tolerance:  >4 METS   Hematologic:         Musculoskeletal:         GI/Hepatic:        (-) GERD and cholecystitis/cholelithiasis   Renal/Genitourinary:         Endo:     (+) Obesity (Class 2), .      Psychiatric:     (+) psychiatric history depression      Infectious Disease:         Malignancy:         Other:                            Physical Exam  Normal systems: dental    Airway   Mallampati: III  TM distance: >3 FB  Neck ROM: full  Comment: Poor effort with mouth opening    Dental     Cardiovascular   Rhythm and rate: regular      Pulmonary    breath sounds clear to auscultation            Lab Results   Component Value Date    WBC 7.6 10/08/2019    HGB 13.1 10/08/2019    HCT 40.7 10/08/2019     10/08/2019     10/08/2019    POTASSIUM 4.0 10/08/2019    CHLORIDE 109 10/08/2019    CO2 28 10/08/2019    BUN 14 10/08/2019    CR 1.00 10/08/2019    GLC 95 10/08/2019    CRISTINE 9.2 10/08/2019    ALBUMIN 3.7 2019    PROTTOTAL 7.8 2019    ALT 21 2019    AST 20 2019    ALKPHOS 76 2019    BILITOTAL 0.4 2019    TSH 1.26 2019    HCG Negative 10/07/2019       Preop Vitals  BP Readings from Last 3 Encounters:   10/08/19 110/73   19 107/68   19 134/86    Pulse Readings from Last 3 Encounters:   10/09/19 90   19 80   19 79      Resp Readings from Last 3 Encounters:   10/09/19 15   19 16   19 18    SpO2 Readings  "from Last 3 Encounters:   10/09/19 97%   09/20/19 96%   07/03/19 96%      Temp Readings from Last 1 Encounters:   10/08/19 36.6  C (97.9  F) (Oral)    Ht Readings from Last 1 Encounters:   10/07/19 1.651 m (5' 5\")      Wt Readings from Last 1 Encounters:   10/08/19 101.2 kg (223 lb)    Estimated body mass index is 37.11 kg/m  as calculated from the following:    Height as of 10/7/19: 1.651 m (5' 5\").    Weight as of 10/8/19: 101.2 kg (223 lb).       Anesthesia Plan      History & Physical Review  History and physical reviewed and following examination; no interval change.    ASA Status:  3 .    NPO Status:  > 8 hours    Plan for General (Mask ventilation) with Intravenous induction.          Postoperative Care  Postoperative pain management:  IV analgesics, Oral pain medications and Multi-modal analgesia.      Consents  Anesthetic plan, risks, benefits and alternatives discussed with:  Patient..                   Bryce Argueta MD  "

## 2019-10-09 NOTE — ANESTHESIA CARE TRANSFER NOTE
Patient: Misty Parham    * No procedures listed *    Diagnosis: * No pre-op diagnosis entered *  Diagnosis Additional Information: No value filed.    Anesthesia Type:   No value filed.     Note:  Airway :Nasal Cannula  Patient transferred to:PACU  Comments: Native airway general anesthetic.  Patient hyperventilated with 100% oxygen via mask prior to treatment.   Anesthesia induced using patent peripheral IV.    Bite block placed between molars to protect teeth and tongue.     After induction of seizure patient mask ventilated with 100% oxygen until spontaneous respirations returned.     At time of handoff to PACU, patient exhibited spontaneous respirations, adequate tidal volumes, airway patent. Oxygen via nasal cannula at 3 liters per minute to PACU in cart with siderails up, connected to wall O2 in PACU. All monitors and alarms on and functioning. Report given to PACU RN and questions answered. Handoff Report: Identifed the Patient, Identified the Reponsible Provider, Reviewed the pertinent medical history, Discussed the surgical course, Reviewed Intra-OP anesthesia mangement and issues during anesthesia, Set expectations for post-procedure period and Allowed opportunity for questions and acknowledgement of understanding      Vitals: (Last set prior to Anesthesia Care Transfer)    CRNA VITALS  10/9/2019 0746 - 10/9/2019 0816      10/9/2019             NIBP:  (!) 137/98    Pulse:  91    NIBP Mean:  106    Ht Rate:  91    SpO2:  99 %    Resp Rate (observed):  9    Resp Rate (set):  10                Electronically Signed By: WILLIAN Emmanuel CRNA  October 9, 2019  8:16 AM

## 2019-10-09 NOTE — ANESTHESIA POSTPROCEDURE EVALUATION
Patient: Misty Parham    * No procedures listed *    Diagnosis:* No pre-op diagnosis entered *  Diagnosis Additional Information: No value filed.    Anesthesia Type:  General    Note:  Anesthesia Post Evaluation    Last vitals:  Vitals:    10/08/19 0830 10/08/19 1500 10/09/19 0800   BP: 109/79 110/73    Pulse: 78 96 90   Resp: 16 16 15   Temp: 36.7  C (98  F) 36.6  C (97.9  F)    SpO2: 96% 100% 97%         Electronically Signed By: Bryce Argueta MD  October 9, 2019  8:18 AM

## 2019-10-09 NOTE — PROGRESS NOTES
[facility] Psychiatric Progress Note       Interim History     The patient's care was discussed with the treatment team and chart notes were reviewed. Per attending nursing and psych associate notes from yesterday, the patient spent majority of her time in her room bed resting. She has been medication compliant, pleasant, and overall cooperative with care. Pt seen on 10/09/19 by Dr. Madrigal. Patient underwent her first ECT session this morning, where there were no complications. She denies experiencing any significant side effects such as headaches, nausea, or major confusion. The patient will undergo her second session on 10/11/19. Aside from this, the patient denies any issues with the increase made in her Rexulti medication yesterday afternoon. Dr. Madrigal does not wish to make any medication adjustments today.      Hospital Course   This is a 33 year old female with previous psychiatric diagnoses that include major depression, generalized anxiety, and traits of borderline personality disorder. She obtains four previous inpatient mental health hospitalizations, her most recent being less than 1 month ago here at [facility]. Patient was presented to [facility] ED on 10/07/19 from [facility] for evaluation of suicidal ideation. Prior to admission, the patient had been on and off of her psychiatric medications. Her symptoms of depression worsened and she had a plan to overdose on Trazodone. Patient was deemed appropriate for inpatient level of care for safety and further stabilization. While meeting with Dr. Madrigal this morning, the patient appeared severely depressed in mood and flat in affect. She was informed that her outpatient psychiatrist believes that she would benefit most from a series of ECT while inpatient. Dr. Madrigal agreed with this, thus will have patient start a series of bilateral ECT tomorrow morning, 10/09/19. In addition to this, the patient was highly encouraged to  "attend group sessions and spend less time isolative to her room.      Medications     Current Facility-Administered Medications Ordered in Epic   Medication Dose Route Frequency Last Rate Last Dose     brexpiprazole (REXULTI) tablet 3 mg  3 mg Oral Daily         hydrOXYzine (ATARAX) tablet 25 mg  25 mg Oral TID PRN         ibuprofen (ADVIL/MOTRIN) tablet 200 mg  200 mg Oral Q6H PRN         mirtazapine (REMERON) tablet 45 mg  45 mg Oral At Bedtime   45 mg at 10/08/19 2052     traZODone (DESYREL) tablet 100-200 mg  100-200 mg Oral At Bedtime   100 mg at 10/08/19 2052     vortioxetine (TRINTELLIX/BRINTELLIX) tablet 20 mg  20 mg Oral Daily   20 mg at 10/08/19 0851     No current [facility]-ordered outpatient medications on file.         Allergies      No Known Allergies     Medical Review of Systems     /73   Pulse 96   Temp 97.9  F (36.6  C) (Oral)   Resp 16   Ht 1.651 m (5' 5\")   Wt 101.2 kg (223 lb)   SpO2 100%   BMI 37.11 kg/m    Body mass index is 37.11 kg/m .  A 10-point review of systems was performed by Zhang Madrigal MD and is negative, no new findings.      Psychiatric Examination     Appearance Sitting in chair, dressed in hospital scrubs. Appears stated age.   Attitude Cooperative   Orientation Oriented to person, place, time   Eye Contact Fair    Speech Regular rate, rhythm, volume and tone   Language Normal   Psychomotor Behavior Normal   Mood Depressed    Affect Flat    Thought Process Goal-Oriented, Intact   Associations Intact   Thought Content Patient is currently negative for suicidal ideation, negative for plan or intent, able to contract no self harm and identify barriers to suicide.  Negative for obsessions, compulsions or psychosis.     Fund of Knowledge Intact   Insight Poor   Judgement Poor   Attention Span & Concentration Intact    Recent & Remote Memory Intact   Gait Normal   Muscle Tone Intact        Labs     Labs reviewed.  Recent Results (from the past 24 hour(s))   CBC with " platelets differential    Collection Time: 10/08/19  8:29 AM   Result Value Ref Range    WBC 7.6 4.0 - 11.0 10e9/L    RBC Count 4.55 3.8 - 5.2 10e12/L    Hemoglobin 13.1 11.7 - 15.7 g/dL    Hematocrit 40.7 35.0 - 47.0 %    MCV 90 78 - 100 fl    MCH 28.8 26.5 - 33.0 pg    MCHC 32.2 31.5 - 36.5 g/dL    RDW 15.1 (H) 10.0 - 15.0 %    Platelet Count 268 150 - 450 10e9/L    Diff Method Automated Method     % Neutrophils 63.6 %    % Lymphocytes 22.9 %    % Monocytes 10.4 %    % Eosinophils 2.6 %    % Basophils 0.4 %    % Immature Granulocytes 0.1 %    Nucleated RBCs 0 0 /100    Absolute Neutrophil 4.8 1.6 - 8.3 10e9/L    Absolute Lymphocytes 1.7 0.8 - 5.3 10e9/L    Absolute Monocytes 0.8 0.0 - 1.3 10e9/L    Absolute Eosinophils 0.2 0.0 - 0.7 10e9/L    Absolute Basophils 0.0 0.0 - 0.2 10e9/L    Abs Immature Granulocytes 0.0 0 - 0.4 10e9/L    Absolute Nucleated RBC 0.0    Basic metabolic panel    Collection Time: 10/08/19  8:29 AM   Result Value Ref Range    Sodium 141 133 - 144 mmol/L    Potassium 4.0 3.4 - 5.3 mmol/L    Chloride 109 94 - 109 mmol/L    Carbon Dioxide 28 20 - 32 mmol/L    Anion Gap 4 3 - 14 mmol/L    Glucose 95 70 - 99 mg/dL    Urea Nitrogen 14 7 - 30 mg/dL    Creatinine 1.00 0.52 - 1.04 mg/dL    GFR Estimate 72 >60 mL/min/[1.73_m2]    GFR Estimate If Black 84 >60 mL/min/[1.73_m2]    Calcium 9.2 8.5 - 10.1 mg/dL        Impression   This is a 33 year old female with previous psychiatric diagnoses that include major depression, generalized anxiety, and traits of borderline personality disorder. Patient underwent her first ECT session this morning where there were no complications. She denied experiencing any major side effects such as headache, nausea, or confusion. Patient did however endorse some mild memory loss. Patient will continue with her second session on 10/11/19. Aside from this, there were no medication adjustments made today.       Diagnoses   1. Major depression, recurrent, severe, without  psychotic features.  2. Generalized Anxiety Disorder  3. Traits of Borderline Personality Disorder     Plan     1. Explained side effects, benefits, and complications of medications to the patient, Pt gave verbal consent.  2. Medication changes: None  3. Discussed treatment plan with patient and team.  4. Projected length of stay: 7+ days   5. ECT #2 on 10/11/19    Attestation:   I, Alessandra Chambers, am serving as a scribe on 10/9/2019 to document services personally performed by Zhang Madrigal MD based on my observations and the provider's statements to me.    Patient ID:  Name: Misty Parham    MRN: [id number]  Admission: 10/7/2019   YOB: 1985

## 2019-10-09 NOTE — PROCEDURES
.[facility] ECT Procedure Note     Misty Parham [id number]   34 year old 1985     Patient Status: Inpatient     No Known Allergies    Weight:  223 lbs 0 oz          Diagnosis:     Major Depression      Indications for ECT:     Medications ineffective     Clinical Narrative:     ECT administered by thymatron machine, consent signed, side effects, risks, benefits reviewed.     Pause for the Cause:     Right patient Yes   Right procedure/laterality settings: Yes   Right diagnosis Yes      Intra-Procedure Documentation:     Date:  10/9/2019  Time:  7:42 AM    ECT #    Treatment number this series: 1   Total treatment number: 6     Type of ECT: Bilateral    ECT Medications:    Brevital: 100mg   Toradol: 30mg  Succinyl Choline: 100mg     ECT Strip Summary:   Energy Level: 80 percent  Motor Seizure Duration: 43 seconds  EEG Seizure Duration: 43 seconds    Complications: None     Plan: ECT #2 on 10/11/19      Alessandra LEBLANC am serving as a scribe at 7:42 AM on 10/9/2019 to document services personally performed by Zhang Madrigal MD based on my observations and the provider's statements to me.

## 2019-10-10 PROCEDURE — 25000132 ZZH RX MED GY IP 250 OP 250 PS 637: Performed by: PSYCHIATRY & NEUROLOGY

## 2019-10-10 PROCEDURE — 12400000 ZZH R&B MH

## 2019-10-10 RX ADMIN — MIRTAZAPINE 45 MG: 45 TABLET, FILM COATED ORAL at 21:09

## 2019-10-10 RX ADMIN — TRAZODONE HYDROCHLORIDE 200 MG: 100 TABLET ORAL at 21:08

## 2019-10-10 RX ADMIN — BREXPIPRAZOLE 3 MG: 1 TABLET ORAL at 08:35

## 2019-10-10 RX ADMIN — VORTIOXETINE 20 MG: 20 TABLET, FILM COATED ORAL at 08:34

## 2019-10-10 NOTE — PROGRESS NOTES
[facility] Psychiatric Progress Note       Interim History     The patient's care was discussed with the treatment team and chart notes were reviewed. Per attending nursing and psych associate notes from yesterday evening, the patient has been found to be withdrawn and isolative to her room. She has been either napping or bed resting majority of yesterday. Proceeds to appear depressed in mood. Pt seen on 10/10/19 by Dr. Madrigal. On interview, the patient denies attaining any concerns or issues in regards of her current medications, overall care on [address], or ECT. There were no complications with her first ECT session yesterday. She will undergo her second session tomorrow morning. Dr. Madrigal does not wish to make any medication adjustments today. Will continue the patient on Rexulti 3mg, Remeron 45mg, and Trintellix 20mg. Aside from this, the patient is again highly encouraged to avoid bed resting and to become more active and present throughout the SDU. Patient acknowledges this.      Hospital Course   This is a 33 year old female with previous psychiatric diagnoses that include major depression, generalized anxiety, and traits of borderline personality disorder. She obtains four previous inpatient mental health hospitalizations, her most recent being less than 1 month ago here at [facility]. Patient was presented to [facility] ED on 10/07/19 from [facility] for evaluation of suicidal ideation. Prior to admission, the patient had been on and off of her psychiatric medications. Her symptoms of depression worsened and she had a plan to overdose on Trazodone. Patient was deemed appropriate for inpatient level of care for safety and further stabilization. While meeting with Dr. Madrigal this morning, the patient appeared severely depressed in mood and flat in affect. She was informed that her outpatient psychiatrist believes that she would benefit most from a series of ECT while inpatient.  "Dr. Madrigal agreed with this, thus will have patient start a series of bilateral ECT tomorrow morning, 10/09/19. In addition to this, the patient was highly encouraged to attend group sessions and spend less time isolative to her room.      Medications     Current Facility-Administered Medications Ordered in Epic   Medication Dose Route Frequency Last Rate Last Dose     brexpiprazole (REXULTI) tablet 3 mg  3 mg Oral Daily   3 mg at 10/10/19 0835     hydrOXYzine (ATARAX) tablet 25 mg  25 mg Oral TID PRN         ibuprofen (ADVIL/MOTRIN) tablet 200 mg  200 mg Oral Q6H PRN         ketorolac (TORADOL) injection 30 mg  30 mg Intravenous Q6H PRN   30 mg at 10/09/19 0805     lactated ringers infusion  500 mL Intravenous Continuous 25 mL/hr at 10/09/19 0807 500 mL at 10/09/19 0807     lidocaine patch in PLACE   Transdermal Q8H   Stopped at 10/09/19 1400     mirtazapine (REMERON) tablet 45 mg  45 mg Oral At Bedtime   45 mg at 10/09/19 2048     traZODone (DESYREL) tablet 100-200 mg  100-200 mg Oral At Bedtime   200 mg at 10/09/19 2048     vortioxetine (TRINTELLIX/BRINTELLIX) tablet 20 mg  20 mg Oral Daily   20 mg at 10/10/19 0834     No current [facility]-ordered outpatient medications on file.         Allergies      No Known Allergies     Medical Review of Systems     /70   Pulse 94   Temp 98.5  F (36.9  C)   Resp 16   Ht 1.651 m (5' 5\")   Wt 101.2 kg (223 lb)   SpO2 97%   BMI 37.11 kg/m    Body mass index is 37.11 kg/m .  A 10-point review of systems was performed by Zhang Madrigal MD and is negative, no new findings.      Psychiatric Examination     Appearance Sitting in chair, dressed in hospital scrubs. Appears stated age.   Attitude Cooperative   Orientation Oriented to person, place, time   Eye Contact Fair    Speech Regular rate, rhythm, volume and tone   Language Normal   Psychomotor Behavior Normal   Mood Depressed    Affect Flat    Thought Process Goal-Oriented, Intact   Associations Intact   Thought " Content Patient is currently negative for suicidal ideation, negative for plan or intent, able to contract no self harm and identify barriers to suicide.  Negative for obsessions, compulsions or psychosis.     Fund of Knowledge Intact   Insight Poor   Judgement Poor   Attention Span & Concentration Intact    Recent & Remote Memory Intact   Gait Normal   Muscle Tone Intact        Labs     Labs reviewed.  No results found for this or any previous visit (from the past 24 hour(s)).     Impression   This is a 33 year old female with previous psychiatric diagnoses that include major depression, generalized anxiety, and traits of borderline personality disorder. While meeting with Dr. Madrigal this afternoon, the patient was quiet, appeared depressed in mood, but was overall cooperative. She denied obtaining any complications, issues, or concerns in regards of her care on [address] or current medications. Dr. Madrigal did not make any medication adjustments today. Would like to continue patient on Remeron, Rexulti, and Trintellix. In addition to this, the patient will undergo her second ECT session tomorrow morning.       Diagnoses   1. Major depression, recurrent, severe, without psychotic features.  2. Generalized Anxiety Disorder  3. Traits of Borderline Personality Disorder     Plan     1. Explained side effects, benefits, and complications of medications to the patient, Pt gave verbal consent.  2. Medication changes: None  3. Discussed treatment plan with patient and team.  4. Projected length of stay: 7+ days   5. ECT #2 on 10/11/19    Attestation:   Alessandra LEBLANC, am serving as a scribe on 10/10/2019 to document services personally performed by Zhang Madrigal MD based on my observations and the provider's statements to me.    Patient ID:  Name: Misty Parham    MRN: [id number]  Admission: 10/7/2019   YOB: 1985

## 2019-10-10 NOTE — PLAN OF CARE
BEHAVIORAL TEAM DISCUSSION    Participants: MD<RN<OT<PA<CM  Progress: Mood somewhat improved; beginning to participate in unit programming  Anticipated length of stay: 7days  Continued Stay Criteria/Rationale: Complete ECT on inpatient basis; follow with outpatient care.  Medical/Physical: NA  Precautions:Code1   Behavioral Orders   Procedures    Code 1 - Restrict to Unit    Electroconvulsive therapy     Series of up to 6 treatments. Begin Date: 10/09/19     Treating Psychiatrist providing ECT:  Dr. Kwon and Dr. Madrigal   Notified on:  10/08/19    Routine Programming     As clinically indicated    Status 15     Every 15 minutes.     Plan: Complete ECT. Assess for additional outpatient resources.  Rationale for change in precautions or plan: NA

## 2019-10-10 NOTE — PLAN OF CARE
Pt has been isolative and bed resting/napping throughout the whole evening shift. Presents a blunted affect. Mood is depressed, sad and hopeless. Respectful to peers and staff. Oriented to date, time, situation, etc... Med compliant. Encouraged to be more active throughout the day.

## 2019-10-10 NOTE — PLAN OF CARE
Problem: Depressive Symptoms  Goal: Depressive Symptoms  Description  1. Patient will take medication as ordered  2. Patient insight will improve and work through grief issues  3. Patient will attend therapies and gain new insights   Outcome: No Change  Flowsheets (Taken 10/10/2019 1241)  Depressive Symptoms Assessed: all  Depressive Symptoms Present: affect; mood  Note:   Pt presents with flat affect and sad mood. Pt thought process and insight WDL. Pt spent most of the shift in her room, attended community meeting. During 1:1, pt states she feels fine today and isn't feeling that tired. Staff encouraged pt to get out of her room / attend more groups. Pt denies Si.

## 2019-10-11 ENCOUNTER — ANESTHESIA (OUTPATIENT)
Dept: SURGERY | Facility: CLINIC | Age: 34
End: 2019-10-11

## 2019-10-11 ENCOUNTER — ANESTHESIA EVENT (OUTPATIENT)
Dept: SURGERY | Facility: CLINIC | Age: 34
End: 2019-10-11

## 2019-10-11 PROCEDURE — 40000010 ZZH STATISTIC ANES STAT CODE-CRNA PER MINUTE

## 2019-10-11 PROCEDURE — 25000132 ZZH RX MED GY IP 250 OP 250 PS 637: Performed by: PSYCHIATRY & NEUROLOGY

## 2019-10-11 PROCEDURE — 25000566 ZZH SEVOFLURANE, EA 15 MIN

## 2019-10-11 PROCEDURE — 25000128 H RX IP 250 OP 636: Performed by: PSYCHIATRY & NEUROLOGY

## 2019-10-11 PROCEDURE — 25800030 ZZH RX IP 258 OP 636: Performed by: ANESTHESIOLOGY

## 2019-10-11 PROCEDURE — 90853 GROUP PSYCHOTHERAPY: CPT

## 2019-10-11 PROCEDURE — 90870 ELECTROCONVULSIVE THERAPY: CPT

## 2019-10-11 PROCEDURE — 25000125 ZZHC RX 250: Performed by: NURSE ANESTHETIST, CERTIFIED REGISTERED

## 2019-10-11 PROCEDURE — 37000008 ZZH ANESTHESIA TECHNICAL FEE, 1ST 30 MIN

## 2019-10-11 PROCEDURE — 12400000 ZZH R&B MH

## 2019-10-11 PROCEDURE — 25000128 H RX IP 250 OP 636: Performed by: NURSE ANESTHETIST, CERTIFIED REGISTERED

## 2019-10-11 RX ORDER — KETOROLAC TROMETHAMINE 30 MG/ML
30 INJECTION, SOLUTION INTRAMUSCULAR; INTRAVENOUS EVERY 6 HOURS PRN
Status: ACTIVE | OUTPATIENT
Start: 2019-10-11 | End: 2019-10-16

## 2019-10-11 RX ADMIN — METHOHEXITAL SODIUM 100 MG: 500 INJECTION, POWDER, LYOPHILIZED, FOR SOLUTION INTRAMUSCULAR; INTRAVENOUS; RECTAL at 07:03

## 2019-10-11 RX ADMIN — SODIUM CHLORIDE, SODIUM LACTATE, POTASSIUM CHLORIDE, CALCIUM CHLORIDE 500 ML: 600; 310; 30; 20 INJECTION, SOLUTION INTRAVENOUS at 06:27

## 2019-10-11 RX ADMIN — KETOROLAC TROMETHAMINE 30 MG: 30 INJECTION, SOLUTION INTRAMUSCULAR at 06:26

## 2019-10-11 RX ADMIN — TRAZODONE HYDROCHLORIDE 200 MG: 100 TABLET ORAL at 20:21

## 2019-10-11 RX ADMIN — BREXPIPRAZOLE 3 MG: 1 TABLET ORAL at 08:07

## 2019-10-11 RX ADMIN — MIRTAZAPINE 45 MG: 45 TABLET, FILM COATED ORAL at 20:21

## 2019-10-11 RX ADMIN — VORTIOXETINE 20 MG: 20 TABLET, FILM COATED ORAL at 08:07

## 2019-10-11 RX ADMIN — SUCCINYLCHOLINE CHLORIDE 100 MG: 20 INJECTION, SOLUTION INTRAMUSCULAR; INTRAVENOUS; PARENTERAL at 07:04

## 2019-10-11 RX ADMIN — IBUPROFEN 200 MG: 200 TABLET, FILM COATED ORAL at 20:22

## 2019-10-11 ASSESSMENT — ACTIVITIES OF DAILY LIVING (ADL)
LAUNDRY: WITH SUPERVISION
ORAL_HYGIENE: INDEPENDENT
DRESS: INDEPENDENT
HYGIENE/GROOMING: INDEPENDENT

## 2019-10-11 ASSESSMENT — LIFESTYLE VARIABLES: TOBACCO_USE: 0

## 2019-10-11 ASSESSMENT — ENCOUNTER SYMPTOMS: SEIZURES: 0

## 2019-10-11 NOTE — PROGRESS NOTES
10/11/19 1500   General Information   Date Initially Attended OT 10/11/19     Pt  attended 1 of 2 OT groups today. With MIN encouragement, pt participated in a mindfulness group focusing on reduction of anxiety, improvement of mood, and increase in concentration. The mindfulness practice was offered via supportive approaches including education and mindful listening. More observation needed to complete initial evaluation at this time. Pt will continue to benefit from OT intervention to address implementation of positive functional coping skills, role performance, and community reintegration.

## 2019-10-11 NOTE — PROCEDURES
.[facility] ECT Procedure Note     Misty Parham [id number]   34 year old 1985     Patient Status: Inpatient     No Known Allergies    Weight:  223 lbs 0 oz          Diagnosis:     Major Depression      Indications for ECT:     Medications ineffective     Clinical Narrative:     ECT administered by thymatron machine, consent signed, side effects, risks, benefits reviewed.     Pause for the Cause:     Right patient Yes   Right procedure/laterality settings: Yes   Right diagnosis Yes      Intra-Procedure Documentation:     Date:  10/11/2019  Time:  7:42 AM    ECT #    Treatment number this series: 2   Total treatment number: 6     Type of ECT: Bilateral    ECT Medications:    Brevital: 100mg   Toradol: 30mg  Succinyl Choline: 100mg     ECT Strip Summary:   Energy Level: 90 percent  Motor Seizure Duration: 43 seconds  EEG Seizure Duration: 43 seconds    Complications: None     Plan: ECT #3 on 10/14/19

## 2019-10-11 NOTE — ANESTHESIA PREPROCEDURE EVALUATION
Anesthesia Pre-Procedure Evaluation    Patient: Misty Parham   MRN: [id number] : 1985          Preoperative Diagnosis: * No pre-op diagnosis entered *    * No procedures listed *    Past Medical History:   Diagnosis Date     Depressive disorder      Past Surgical History:   Procedure Laterality Date     CHOLECYSTECTOMY         Anesthesia Evaluation     . Pt has had prior anesthetic.     No history of anesthetic complications          ROS/MED HX    ENT/Pulmonary:      (-) tobacco use, asthma and sleep apnea   Neurologic:      (-) seizures and CVA   Cardiovascular:         METS/Exercise Tolerance:     Hematologic:         Musculoskeletal:         GI/Hepatic:        (-) GERD   Renal/Genitourinary:         Endo:         Psychiatric:     (+) psychiatric history (suicidal ideation) depression      Infectious Disease:         Malignancy:         Other:                          Physical Exam  Normal systems: dental    Airway   Mallampati: III  TM distance: >3 FB  Neck ROM: full    Dental     Cardiovascular   Rhythm and rate: regular and normal      Pulmonary    breath sounds clear to auscultation            Lab Results   Component Value Date    WBC 7.6 10/08/2019    HGB 13.1 10/08/2019    HCT 40.7 10/08/2019     10/08/2019     10/08/2019    POTASSIUM 4.0 10/08/2019    CHLORIDE 109 10/08/2019    CO2 28 10/08/2019    BUN 14 10/08/2019    CR 1.00 10/08/2019    GLC 95 10/08/2019    CRISTINE 9.2 10/08/2019    ALBUMIN 3.7 2019    PROTTOTAL 7.8 2019    ALT 21 2019    AST 20 2019    ALKPHOS 76 2019    BILITOTAL 0.4 2019    TSH 1.26 2019    HCG Negative 10/07/2019       Preop Vitals  BP Readings from Last 3 Encounters:   10/11/19 118/71   19 107/68   19 134/86    Pulse Readings from Last 3 Encounters:   10/11/19 86   19 80   19 79      Resp Readings from Last 3 Encounters:   10/11/19 15   19 16   19 18    SpO2 Readings from Last 3  "Encounters:   10/11/19 99%   09/20/19 96%   07/03/19 96%      Temp Readings from Last 1 Encounters:   10/11/19 36.4  C (97.5  F) (Temporal)    Ht Readings from Last 1 Encounters:   10/07/19 1.651 m (5' 5\")      Wt Readings from Last 1 Encounters:   10/08/19 101.2 kg (223 lb)    Estimated body mass index is 37.11 kg/m  as calculated from the following:    Height as of this encounter: 1.651 m (5' 5\").    Weight as of this encounter: 101.2 kg (223 lb).       Anesthesia Plan      History & Physical Review  History and physical reviewed and following examination; no interval change.    ASA Status:  2 .        Plan for General and RSI with Intravenous induction.          Postoperative Care      Consents  Anesthetic plan, risks, benefits and alternatives discussed with:  Patient..                 Shanika Torres  "

## 2019-10-11 NOTE — PLAN OF CARE
"Pt presents with a flat affect and a depressed mood. Pt was withdrawn and isolative outside of groups, but did participate in movement and wrap up group. Pt stated that she has been trying to stay out of her room more. Pt stated that she is having a hard time with her separation from her , as \"he is turning the kids against me\". Pt stated that family does not understand depression and they are not open to learning more. Pt stated that ECT is helping and she feels a little brighter today. Pt stated that she will shower tomorrow after ECT. Pt's insight and judgement are impaired and her thought process is improving. Pt denies SI.   "

## 2019-10-11 NOTE — PLAN OF CARE
Problem: Depressive Symptoms  Goal: Depressive Symptoms  Description  1. Patient will take medication as ordered  2. Patient insight will improve and work through grief issues  3. Patient will attend therapies and gain new insights   10/11/2019 1445 by Quan Hester  Outcome: No Change     Problem: Depressive Symptoms  Goal: Depressive Symptoms  Description  1. Patient will take medication as ordered  2. Patient insight will improve and work through grief issues  3. Patient will attend therapies and gain new insights   10/11/2019 1446 by Quan Hester  Outcome: No Change  Flowsheets (Taken 10/11/2019 1446)  Depressive Symptoms Assessed: all  Depressive Symptoms Present: anxiety; insight; mood  Note:   Patient has no medication changes and was medication compliant. Patient isolative and withdrawn. Patient did not attended groups. Patient stated that she is sad about not seeing her kids. Patient exhibited a tremor in her legs. Patient appeared depressed and anxious.

## 2019-10-12 PROCEDURE — 25000132 ZZH RX MED GY IP 250 OP 250 PS 637: Performed by: PSYCHIATRY & NEUROLOGY

## 2019-10-12 PROCEDURE — 12400000 ZZH R&B MH

## 2019-10-12 RX ADMIN — BREXPIPRAZOLE 3 MG: 1 TABLET ORAL at 09:04

## 2019-10-12 RX ADMIN — TRAZODONE HYDROCHLORIDE 200 MG: 100 TABLET ORAL at 20:46

## 2019-10-12 RX ADMIN — VORTIOXETINE 20 MG: 20 TABLET, FILM COATED ORAL at 09:04

## 2019-10-12 RX ADMIN — MIRTAZAPINE 45 MG: 45 TABLET, FILM COATED ORAL at 20:46

## 2019-10-12 ASSESSMENT — ACTIVITIES OF DAILY LIVING (ADL)
LAUNDRY: UNABLE TO COMPLETE
DRESS: INDEPENDENT
ORAL_HYGIENE: INDEPENDENT
ORAL_HYGIENE: INDEPENDENT
DRESS: INDEPENDENT
LAUNDRY: WITH SUPERVISION
HYGIENE/GROOMING: INDEPENDENT
HYGIENE/GROOMING: INDEPENDENT

## 2019-10-12 NOTE — PROGRESS NOTES
[facility] Psychiatric Progress Note       Interim History     The patient's care was discussed with the treatment team and chart notes were reviewed. Per attending nursing and psych associate notes from yesterday evening, the patient has been found to be withdrawn and isolative to her room. She has continued to  Either be  napping or bed resting majority of yesterday.  There were no complications with her first ECT session today. She will undergo her second session tomorrow morning. Dr. Madrigal does not wish to make any medication adjustments today. Will continue the patient on Rexulti 3mg, Remeron 45mg, and Trintellix 20mg. Aside from this, the patient is again highly encouraged to avoid bed resting and to become more active and present throughout the SDU. Patient acknowledges this.      Hospital Course   This is a 33 year old female with previous psychiatric diagnoses that include major depression, generalized anxiety, and traits of borderline personality disorder. She obtains four previous inpatient mental health hospitalizations, her most recent being less than 1 month ago here at [facility]. Patient was presented to [facility] ED on 10/07/19 from [facility] for evaluation of suicidal ideation. Prior to admission, the patient had been on and off of her psychiatric medications. Her symptoms of depression worsened and she had a plan to overdose on Trazodone. Patient was deemed appropriate for inpatient level of care for safety and further stabilization. While meeting with Dr. Madrigal this morning, the patient appeared severely depressed in mood and flat in affect. She was informed that her outpatient psychiatrist believes that she would benefit most from a series of ECT while inpatient. Dr. Madrigal agreed with this, thus will have patient start a series of bilateral ECT tomorrow morning, 10/09/19. In addition to this, the patient was highly encouraged to attend group sessions and spend  "less time isolative to her room.      Medications     Current Facility-Administered Medications Ordered in Epic   Medication Dose Route Frequency Last Rate Last Dose     brexpiprazole (REXULTI) tablet 3 mg  3 mg Oral Daily   3 mg at 10/11/19 0807     hydrOXYzine (ATARAX) tablet 25 mg  25 mg Oral TID PRN         ibuprofen (ADVIL/MOTRIN) tablet 200 mg  200 mg Oral Q6H PRN   200 mg at 10/11/19 2022     ketorolac (TORADOL) injection 30 mg  30 mg Intravenous Q6H PRN   30 mg at 10/11/19 0626     lactated ringers infusion  500 mL Intravenous Continuous   Stopped at 10/11/19 0713     mirtazapine (REMERON) tablet 45 mg  45 mg Oral At Bedtime   45 mg at 10/11/19 2021     traZODone (DESYREL) tablet 100-200 mg  100-200 mg Oral At Bedtime   200 mg at 10/11/19 2021     vortioxetine (TRINTELLIX/BRINTELLIX) tablet 20 mg  20 mg Oral Daily   20 mg at 10/11/19 0807     No current [facility]-ordered outpatient medications on file.         Allergies      No Known Allergies     Medical Review of Systems     /76   Pulse 67   Temp 97.6  F (36.4  C) (Oral)   Resp 16   Ht 1.651 m (5' 5\")   Wt 101.2 kg (223 lb)   SpO2 100%   BMI 37.11 kg/m    Body mass index is 37.11 kg/m .  A 10-point review of systems was performed by Zhang Madrigal MD and is negative, no new findings.      Psychiatric Examination     Appearance Sitting in chair, dressed in hospital scrubs. Appears stated age.   Attitude Cooperative   Orientation Oriented to person, place, time   Eye Contact Fair    Speech Regular rate, rhythm, volume and tone   Language Normal   Psychomotor Behavior Normal   Mood Depressed    Affect Flat    Thought Process Goal-Oriented, Intact   Associations Intact   Thought Content Patient is currently negative for suicidal ideation, negative for plan or intent, able to contract no self harm and identify barriers to suicide.  Negative for obsessions, compulsions or psychosis.     Fund of Knowledge Intact   Insight Poor   Judgement Poor "   Attention Span & Concentration Intact    Recent & Remote Memory Intact   Gait Normal   Muscle Tone Intact        Labs     Labs reviewed.  No results found for this or any previous visit (from the past 24 hour(s)).     Impression   This is a 33 year old female with previous psychiatric diagnoses that include major depression, generalized anxiety, and traits of borderline personality disorder. While meeting with Dr. Madrigal this afternoon, the patient was quiet, appeared depressed in mood, but was overall cooperative. She denied obtaining any complications, issues, or concerns in regards of her care on [address] or current medications. Dr. Madrigal did not make any medication adjustments today. Would like to continue patient on Remeron, Rexulti, and Trintellix. In addition to this, the patient will undergo her second ECT session tomorrow morning.       Diagnoses   1. Major depression, recurrent, severe, without psychotic features.  2. Generalized Anxiety Disorder  3. Traits of Borderline Personality Disorder     Plan     1. Explained side effects, benefits, and complications of medications to the patient, Pt gave verbal consent.  2. Medication changes: None  3. Discussed treatment plan with patient and team.  4. Projected length of stay: 7+ days   5. ECT #2 on 10/11/19    Attestation:   Alessandra LEBLANC, am serving as a scribe on 10/10/2019 to document services personally performed by Zhang Madrigal MD based on my observations and the provider's statements to me.    Patient ID:  Name: Misty Parham    MRN: [id number]  Admission: 10/7/2019   YOB: 1985

## 2019-10-12 NOTE — PLAN OF CARE
Pt spent time in her room bed resting or looking out of the window as well as in the lounge watching tv but was withdrawn. Pt presents with a flat affect and depressed mood, she did not attend any evening groups.

## 2019-10-12 NOTE — PLAN OF CARE
Problem: Depressive Symptoms  Goal: Depressive Symptoms  Description  1. Patient will take medication as ordered  2. Patient insight will improve and work through grief issues  3. Patient will attend therapies and gain new insights   Outcome: No Change  Flowsheets (Taken 10/12/2019 1328)  Depressive Symptoms Assessed: all  Depressive Symptoms Present: mood; insight  Note:   Pt presents with flat, sad affect and depressed mood. Pt states that she feels okay today, and that she feels the ECT treatment and medications are helping her mood. She remains isolative and withdrawn, but spends more time outside of her room during the day. Pt continues to behave appropriately, but keeps to herself rather than engaging with others. Pt's thought processes are fair, and insight and judgement are impaired. She denies current Si, and is med-compliant.

## 2019-10-13 PROCEDURE — 25000132 ZZH RX MED GY IP 250 OP 250 PS 637: Performed by: PSYCHIATRY & NEUROLOGY

## 2019-10-13 PROCEDURE — 12400000 ZZH R&B MH

## 2019-10-13 RX ADMIN — TRAZODONE HYDROCHLORIDE 200 MG: 100 TABLET ORAL at 20:35

## 2019-10-13 RX ADMIN — VORTIOXETINE 20 MG: 20 TABLET, FILM COATED ORAL at 08:52

## 2019-10-13 RX ADMIN — BREXPIPRAZOLE 3 MG: 1 TABLET ORAL at 08:52

## 2019-10-13 RX ADMIN — MIRTAZAPINE 45 MG: 45 TABLET, FILM COATED ORAL at 20:35

## 2019-10-13 ASSESSMENT — ACTIVITIES OF DAILY LIVING (ADL)
ORAL_HYGIENE: INDEPENDENT
LAUNDRY: WITH SUPERVISION
DRESS: SCRUBS (BEHAVIORAL HEALTH)
HYGIENE/GROOMING: PROMPTS
DRESS: INDEPENDENT
ORAL_HYGIENE: INDEPENDENT
HYGIENE/GROOMING: INDEPENDENT

## 2019-10-13 NOTE — PLAN OF CARE
Problem: Depressive Symptoms  Goal: Depressive Symptoms  Description  1. Patient will take medication as ordered  2. Patient insight will improve and work through grief issues  3. Patient will attend therapies and gain new insights   Outcome: No Change  Flowsheets (Taken 10/13/2019 1309)  Depressive Symptoms Assessed: all  Depressive Symptoms Present: mood; affect; insight; thought process  Note:   Pt presents with blunted affect and a depressed mood. Pt states that she feels isolative and spends  the majority of the day in her room, coming out only to eat meals. She refuses groups and social interaction despite staff encouragement. Pt's insight, thought processes, and judgement are impaired. She continues to exhibit appropriate behavior. Pt feels that ECT has helped, but continues to feel depressed and slightly anxious. She denies current Si and is med-compliant.

## 2019-10-13 NOTE — PLAN OF CARE
Pt presents a blunted affect; mood is anxious and depressed. Has been in the lounge for the majority of the evening shift; periodically going to her room. Respectful to peers and staff. Med compliant. Keeps to herself unless approached. Pt is improving and feels ECT and meds are working so far. Pt did not attend any evening groups.

## 2019-10-14 ENCOUNTER — ANESTHESIA (OUTPATIENT)
Dept: SURGERY | Facility: CLINIC | Age: 34
End: 2019-10-14

## 2019-10-14 ENCOUNTER — ANESTHESIA EVENT (OUTPATIENT)
Dept: SURGERY | Facility: CLINIC | Age: 34
End: 2019-10-14

## 2019-10-14 PROCEDURE — 12400000 ZZH R&B MH

## 2019-10-14 PROCEDURE — 25000132 ZZH RX MED GY IP 250 OP 250 PS 637: Performed by: PSYCHIATRY & NEUROLOGY

## 2019-10-14 PROCEDURE — 90853 GROUP PSYCHOTHERAPY: CPT

## 2019-10-14 PROCEDURE — 90870 ELECTROCONVULSIVE THERAPY: CPT

## 2019-10-14 PROCEDURE — 25000125 ZZHC RX 250: Performed by: NURSE ANESTHETIST, CERTIFIED REGISTERED

## 2019-10-14 PROCEDURE — 25000128 H RX IP 250 OP 636: Performed by: NURSE ANESTHETIST, CERTIFIED REGISTERED

## 2019-10-14 PROCEDURE — 25000128 H RX IP 250 OP 636: Performed by: PSYCHIATRY & NEUROLOGY

## 2019-10-14 PROCEDURE — 25800030 ZZH RX IP 258 OP 636: Performed by: ANESTHESIOLOGY

## 2019-10-14 RX ORDER — SODIUM CHLORIDE, SODIUM LACTATE, POTASSIUM CHLORIDE, CALCIUM CHLORIDE 600; 310; 30; 20 MG/100ML; MG/100ML; MG/100ML; MG/100ML
500 INJECTION, SOLUTION INTRAVENOUS CONTINUOUS
Status: DISCONTINUED | OUTPATIENT
Start: 2019-10-14 | End: 2019-10-16

## 2019-10-14 RX ORDER — KETOROLAC TROMETHAMINE 30 MG/ML
30 INJECTION, SOLUTION INTRAMUSCULAR; INTRAVENOUS EVERY 6 HOURS PRN
Status: DISCONTINUED | OUTPATIENT
Start: 2019-10-14 | End: 2019-10-14

## 2019-10-14 RX ADMIN — SUCCINYLCHOLINE CHLORIDE 100 MG: 20 INJECTION, SOLUTION INTRAMUSCULAR; INTRAVENOUS; PARENTERAL at 07:03

## 2019-10-14 RX ADMIN — METHOHEXITAL SODIUM 100 MG: 500 INJECTION, POWDER, LYOPHILIZED, FOR SOLUTION INTRAMUSCULAR; INTRAVENOUS; RECTAL at 07:03

## 2019-10-14 RX ADMIN — MIRTAZAPINE 45 MG: 45 TABLET, FILM COATED ORAL at 20:43

## 2019-10-14 RX ADMIN — SODIUM CHLORIDE, SODIUM LACTATE, POTASSIUM CHLORIDE, CALCIUM CHLORIDE 500 ML: 600; 310; 30; 20 INJECTION, SOLUTION INTRAVENOUS at 06:25

## 2019-10-14 RX ADMIN — TRAZODONE HYDROCHLORIDE 200 MG: 100 TABLET ORAL at 20:43

## 2019-10-14 RX ADMIN — VORTIOXETINE 20 MG: 20 TABLET, FILM COATED ORAL at 08:06

## 2019-10-14 RX ADMIN — KETOROLAC TROMETHAMINE 30 MG: 30 INJECTION, SOLUTION INTRAMUSCULAR at 06:26

## 2019-10-14 RX ADMIN — IBUPROFEN 200 MG: 200 TABLET, FILM COATED ORAL at 20:42

## 2019-10-14 RX ADMIN — BREXPIPRAZOLE 3 MG: 1 TABLET ORAL at 08:06

## 2019-10-14 ASSESSMENT — ACTIVITIES OF DAILY LIVING (ADL)
DRESS: INDEPENDENT
DRESS: INDEPENDENT
HYGIENE/GROOMING: INDEPENDENT
ORAL_HYGIENE: INDEPENDENT
ORAL_HYGIENE: INDEPENDENT
HYGIENE/GROOMING: INDEPENDENT
LAUNDRY: WITH SUPERVISION
LAUNDRY: WITH SUPERVISION

## 2019-10-14 ASSESSMENT — LIFESTYLE VARIABLES: TOBACCO_USE: 0

## 2019-10-14 ASSESSMENT — ENCOUNTER SYMPTOMS: SEIZURES: 0

## 2019-10-14 NOTE — PROCEDURES
.[facility] ECT Procedure Note     Misty Parham [id number]   34 year old 1985     Patient Status: Inpatient     No Known Allergies    Weight:  223 lbs 0 oz          Diagnosis:     Major Depression      Indications for ECT:     Medications ineffective     Clinical Narrative:     ECT administered by thymatron machine, consent signed, side effects, risks, benefits reviewed.     Pause for the Cause:     Right patient Yes   Right procedure/laterality settings: Yes   Right diagnosis Yes      Intra-Procedure Documentation:     Date:  10/14/2019  Time:  7:42 AM    ECT #    Treatment number this series: 3   Total treatment number: 6     Type of ECT: Bilateral    ECT Medications:    Brevital: 100mg   Toradol: 30mg  Succinyl Choline: 100mg     ECT Strip Summary:   Energy Level: 70 percent  Motor Seizure Duration: 43 seconds  EEG Seizure Duration: 43 seconds    Complications: None     Plan: ECT #4 on 10/16/19

## 2019-10-14 NOTE — ANESTHESIA CARE TRANSFER NOTE
Patient: Misty Parham    * No procedures listed *    Diagnosis: * No pre-op diagnosis entered *  Diagnosis Additional Information: No value filed.    Anesthesia Type:   General     Note:  Airway :Nasal Cannula  Patient transferred to:PACU  Comments: Native airway general anesthetic.  Patient hyperventilated with 100% oxygen via mask prior to treatment.   Anesthesia induced using patent peripheral IV.    Bite block placed between molars to protect teeth and tongue.     After induction of seizure patient mask ventilated with 100% oxygen until spontaneous respirations returned.     At time of handoff to PACU, patient exhibited spontaneous respirations, adequate tidal volumes, airway patent. Oxygen via nasal cannula at 4 liters per minute to PACU in cart with siderails up, connected to wall O2 in PACU. All monitors and alarms on and functioning. Report given to PACU RN and questions answered. Handoff Report: Identifed the Patient, Identified the Reponsible Provider, Reviewed the pertinent medical history, Discussed the surgical course, Reviewed Intra-OP anesthesia mangement and issues during anesthesia, Set expectations for post-procedure period and Allowed opportunity for questions and acknowledgement of understanding      Vitals: (Last set prior to Anesthesia Care Transfer)    CRNA VITALS  10/14/2019 0640 - 10/14/2019 0713      10/14/2019             NIBP:  (!) 144/112    Pulse:  113    NIBP Mean:  126    SpO2:  97 %    Resp Rate (set):  10                Electronically Signed By: WILLIAN Longoria CRNA  October 14, 2019  7:13 AM

## 2019-10-14 NOTE — ANESTHESIA POSTPROCEDURE EVALUATION
Patient: Misty Parham    * No procedures listed *    Diagnosis:* No pre-op diagnosis entered *  Diagnosis Additional Information: No value filed.    Anesthesia Type:  General    Note:  Anesthesia Post Evaluation    Patient location during evaluation: PACU  Patient participation: Able to fully participate in evaluation  Level of consciousness: awake and alert  Pain management: adequate  Airway patency: patent  Cardiovascular status: acceptable and stable  Respiratory status: acceptable, spontaneous ventilation, unassisted and nonlabored ventilation  Hydration status: acceptable  PONV: none             Last vitals:  Vitals:    10/14/19 0745 10/14/19 0800 10/14/19 1530   BP: 120/79 110/76 102/71   Pulse: 84 90 51   Resp: 14 16 16   Temp:  36.6  C (97.8  F) 36.7  C (98.1  F)   SpO2: 97% 95% 98%         Electronically Signed By: Nilo Thomas MD  October 14, 2019  6:24 PM

## 2019-10-14 NOTE — PROGRESS NOTES
[facility] Psychiatric Progress Note       Interim History     The patient's care was discussed with the treatment team and chart notes were reviewed. Over the weekend, the patient continued to be isolative and withdrawn to her room, but had been respectful to her peers and staff members. She was able to report feeling as though ECT has been helpful in improving her mood, along with her current medication regimen. Patient seen on 10/14/19 by Dr. Madrigal. Patient underwent her third ECT session this morning, where there were no complications. She will undergo her fourth session on 10/16/19. Aside from this, Dr. Madrigal asks if the patient has attempted to go to any group sessions over the weekend, in which the patients denies. He again encourages her to start attending group sessions regularly. Patient acknowledges.      Hospital Course   This is a 33 year old female with previous psychiatric diagnoses that include major depression, generalized anxiety, and traits of borderline personality disorder. She obtains four previous inpatient mental health hospitalizations, her most recent being less than 1 month ago here at [facility]. Patient was presented to [facility] ED on 10/07/19 from [facility] for evaluation of suicidal ideation. Prior to admission, the patient had been on and off of her psychiatric medications. Her symptoms of depression worsened and she had a plan to overdose on Trazodone. Patient was deemed appropriate for inpatient level of care for safety and further stabilization. While meeting with Dr. Madrigal this morning, the patient appeared severely depressed in mood and flat in affect. She was informed that her outpatient psychiatrist believes that she would benefit most from a series of ECT while inpatient. Dr. Madrigal agreed with this, thus will have patient start a series of bilateral ECT tomorrow morning, 10/09/19. In addition to this, the patient was highly encouraged to  "attend group sessions and spend less time isolative to her room.      Medications     Current Facility-Administered Medications Ordered in Epic   Medication Dose Route Frequency Last Rate Last Dose     [Auto Hold] brexpiprazole (REXULTI) tablet 3 mg  3 mg Oral Daily   3 mg at 10/13/19 0852     [Auto Hold] hydrOXYzine (ATARAX) tablet 25 mg  25 mg Oral TID PRN         [Auto Hold] ibuprofen (ADVIL/MOTRIN) tablet 200 mg  200 mg Oral Q6H PRN   200 mg at 10/11/19 2022     [Auto Hold] ketorolac (TORADOL) injection 30 mg  30 mg Intravenous Q6H PRN   30 mg at 10/14/19 0626     lactated ringers infusion  500 mL Intravenous Continuous         lactated ringers infusion  500 mL Intravenous Continuous 25 mL/hr at 10/14/19 0625 500 mL at 10/14/19 0625     lidocaine 1 % 0.1-1 mL  0.1-1 mL Other Q1H PRN         lidocaine patch in PLACE   Transdermal Q8H         [Auto Hold] mirtazapine (REMERON) tablet 45 mg  45 mg Oral At Bedtime   45 mg at 10/13/19 2035     sodium chloride (PF) 0.9% PF flush 3 mL  3 mL Intracatheter q1 min prn         [Auto Hold] traZODone (DESYREL) tablet 100-200 mg  100-200 mg Oral At Bedtime   200 mg at 10/13/19 2035     [Auto Hold] vortioxetine (TRINTELLIX/BRINTELLIX) tablet 20 mg  20 mg Oral Daily   20 mg at 10/13/19 0852     No current [facility]-ordered outpatient medications on file.         Allergies      No Known Allergies     Medical Review of Systems     /79   Pulse 84   Temp 97.8  F (36.6  C) (Oral)   Resp 14   Ht 1.651 m (5' 5\")   Wt 101.2 kg (223 lb)   SpO2 97%   BMI 37.11 kg/m    Body mass index is 37.11 kg/m .  A 10-point review of systems was performed by Zhang Madrigal MD and is negative, no new findings.      Psychiatric Examination     Appearance Sitting in chair, dressed in hospital scrubs. Appears stated age.   Attitude Cooperative   Orientation Oriented to person, place, time   Eye Contact Fair    Speech Regular rate, rhythm, volume and tone   Language Normal   Psychomotor " Behavior Normal   Mood Depressed    Affect Flat    Thought Process Goal-Oriented, Intact   Associations Intact   Thought Content Patient is currently negative for suicidal ideation, negative for plan or intent, able to contract no self harm and identify barriers to suicide.  Negative for obsessions, compulsions or psychosis.     Fund of Knowledge Intact   Insight Poor   Judgement Poor   Attention Span & Concentration Intact    Recent & Remote Memory Intact   Gait Normal   Muscle Tone Intact        Labs     Labs reviewed.  No results found for this or any previous visit (from the past 24 hour(s)).     Impression   This is a 33 year old female with previous psychiatric diagnoses that include major depression, generalized anxiety, and traits of borderline personality disorder. Patient underwent her third ECT session this morning, where there were no complications. She will undergo her fourth session on 10/16/19. While meeting with patient this morning, Dr. Madrigal again encouraged the patient to start attending group sessions regularly and avoid staying isolative to her room. It was reported that over the weekend, the patient continued to be isolative and withdrawn to her room. Patient acknowledged Dr. Madrigal's request and declared she would try to stay out of her room more.       Diagnoses   1. Major depression, recurrent, severe, without psychotic features.  2. Generalized Anxiety Disorder  3. Traits of Borderline Personality Disorder     Plan     1. Explained side effects, benefits, and complications of medications to the patient, Pt gave verbal consent.  2. Medication changes: None  3. Discussed treatment plan with patient and team.  4. Projected length of stay: 7+ days   5. ECT #4 on 10/16/19    Attestation:   Alessandra LEBLANC, am serving as a scribe on 10/14/2019 to document services personally performed by Zhang Madrigal MD based on my observations and the provider's statements to me.    Patient  ID:  Name: Misty Parham    MRN: [id number]  Admission: 10/7/2019   YOB: 1985

## 2019-10-14 NOTE — PLAN OF CARE
Problem: Depressive Symptoms  Goal: Depressive Symptoms  Description  1. Patient will take medication as ordered  2. Patient insight will improve and work through grief issues  3. Patient will attend therapies and gain new insights   10/14/2019 1309 by Domenica Grimes  Outcome: Improving  Flowsheets (Taken 10/14/2019 1309)  Depressive Symptoms Assessed: all  Depressive Symptoms Present: affect; mood  Note:   Pt presents with a flat affect and depressed mood. Pt states that she feels less isolative today, but has no interest in attending groups because she is shy. Pt reports that ECT seems to be helping with her depression, and feels hopeful about this. She responded well to encouragement from staff [facility], and stated that she is working on this. Pt's thought processes are fair, insight is appropriate, and judgement is impaired. She denies Si and med-compliant.

## 2019-10-14 NOTE — PLAN OF CARE
Pt spent more time in lounge with peers compared to a few days ago. Pt states ECT is helping; reminded to be NPO after midnight tonight for ECT #3 tomorrow morning. Med compliant. Eating well. Pt'smood is depressed. Flat affect. Pt denies suicidal ideation.

## 2019-10-14 NOTE — PLAN OF CARE
BEHAVIORAL TEAM DISCUSSION    Participants: MD, CM, OT, RN, PA  Progress: improving, less isolative, more hopeful  Anticipated length of stay: 7+ days  Continued Stay Criteria/Rationale: ECT treatments, medication managment  Medical/Physical: NA  Precautions:   Behavioral Orders   Procedures    Code 1 - Restrict to Unit    Electroconvulsive therapy     Series of up to 6 treatments. Begin Date: 10/09/19     Treating Psychiatrist providing ECT:  Dr. Kwon and Dr. Madrigal   Notified on:  10/08/19    Electroconvulsive therapy     Series of up to 6 treatments. Begin Date: 10/09/19     Treating Psychiatrist providing ECT:  Dr. Rayo Madrigal   Notified on:  10/08/19    Routine Programming     As clinically indicated    Status 15     Every 15 minutes.     Plan: encourage groups, medication management  Rationale for change in precautions or plan:NA

## 2019-10-15 PROCEDURE — 90853 GROUP PSYCHOTHERAPY: CPT

## 2019-10-15 PROCEDURE — 25000132 ZZH RX MED GY IP 250 OP 250 PS 637: Performed by: PSYCHIATRY & NEUROLOGY

## 2019-10-15 PROCEDURE — 12400000 ZZH R&B MH

## 2019-10-15 RX ADMIN — VORTIOXETINE 20 MG: 20 TABLET, FILM COATED ORAL at 08:23

## 2019-10-15 RX ADMIN — BREXPIPRAZOLE 3 MG: 1 TABLET ORAL at 08:23

## 2019-10-15 RX ADMIN — TRAZODONE HYDROCHLORIDE 200 MG: 100 TABLET ORAL at 20:50

## 2019-10-15 RX ADMIN — MIRTAZAPINE 45 MG: 45 TABLET, FILM COATED ORAL at 20:50

## 2019-10-15 ASSESSMENT — ACTIVITIES OF DAILY LIVING (ADL)
LAUNDRY: WITH SUPERVISION
ORAL_HYGIENE: INDEPENDENT
LAUNDRY: WITH SUPERVISION
HYGIENE/GROOMING: INDEPENDENT
HYGIENE/GROOMING: INDEPENDENT
ORAL_HYGIENE: INDEPENDENT
DRESS: INDEPENDENT
DRESS: INDEPENDENT

## 2019-10-15 ASSESSMENT — MIFFLIN-ST. JEOR: SCORE: 1721.45

## 2019-10-15 NOTE — PLAN OF CARE
Problem: Depressive Symptoms  Goal: Depressive Symptoms  Description  1. Patient will take medication as ordered  2. Patient insight will improve and work through grief issues  3. Patient will attend therapies and gain new insights   10/14/2019 2157 by Ivana Serrano  Outcome: Improving  Flowsheets  Taken 10/14/2019 1309 by Domenica Grimes  Depressive Symptoms Assessed: all  Taken 10/14/2019 2157 by Ivana Serrano  Depressive Symptoms Present: mood;affect  Note:   Pt presents with flat affect and sad mood. Pt thought process and insight improving. Pt spent more time outside her room today, still withdrawn socially. Pt states she feels fine, denies Si.

## 2019-10-15 NOTE — PROGRESS NOTES
"   10/15/19 1400   Clinical Impression   Affect Flat   Orientation Oriented to person, place and time   Appearance and ADLs General cleanliness observed in most areas   Attention to Internal Stimuli No observed signs   Interaction Skills Interacts with prompts, minimal response   Ability to Communicate Needs Does so with prompts   Verbal Content Appropriate to topic   Ability to Maintain Boundaries Maintains appropriate physical boundaries;Maintains appropriate verbal boundaries   Participation Participates with frequent encouragement   Concentration Concentrates 10-20 minutes;Needs further assessment   Ability to Concentrate With structure   Follows and Comprehends Directions Needs further assessment   Memory Delayed and immediate recall intact   Organization Independently organizes simple tasks   Decision Making Needs further assessment   Planning and Problem Solving Needs further assessment   Ability to Apply and Learn Concepts Applies within group structure   Frustrations / Stress Tolerance Independently identifies sources of frustration/stress   Level of Insight Identifies needs with structure/support   Self Esteem Poor self esteem   Social Supports Has knowledge of support systems   General Observation/Plan   General Observations/Plan See Comments     INITIAL O.T. ASSESSMENT:      Pt transitioned with MOD encouragement to OT group which addressed illness management through therapeutic group activity and discussion on the stages of change. Educated pt on the 5 stages of change and various steps throughout the recovery process. Pt reports that she is currently in the awareness stage of change. One thing in particular that she reports she is working towards is \"learning to be happy.\" With MIN A, pt ID'd things she has control over that she can do to work towards this goal. Pt will continue to benefit from OT intervention to address implementation of positive functional coping skills, role performance, and " community reintegration.      OT assessment completed by semi-structured interview, chart review, and direct observation. Per chart, pt admitted 2/2 worsening depression and SI. Goals ID'd include to be more assertive, to manage time better, to increase motivation, and to take better care of personal health. OT staff explained the purpose of pt being involved in current treatment plan.      Plan: Encourage ongoing attendance as able to meet self-stated goals, implement positive coping skills, engage in therapeutic activities, and provide assessment ongoing.

## 2019-10-15 NOTE — PLAN OF CARE
Pt spent the shift attending groups. She is flat and depressed. Pt does not speak much, and when she does it is very quiet. She feels like things have somewhat improved. Pt denies SI at this time. Insight and judgement are impaired.

## 2019-10-15 NOTE — PROGRESS NOTES
Spoke with Kavon @ [facility] (). She is available to help with resources for patient. Went over discharge planning last 3 hospitalizations. Kavon can call patient after discharge to  and support her, as well as provide resources. She will call patient on the unit to pursue a relationship, so that patient might continue to take her calls after discharge. Patient has declined to answer in the past.

## 2019-10-15 NOTE — PROGRESS NOTES
[facility] Psychiatric Progress Note       Interim History     The patient's care was discussed with the treatment team and chart notes were reviewed. Patient proceeds to remain isolative and withdrawn according to nursing and psych associate notes. Patient seen on 10/15/19 by Dr. Madrigal. The patient reports she is doing well this morning. She denies having any complications, issues, or concerns in regards of her medications or overall care. Dr. Madrigal does not wish to make any medication adjustments this morning, will continue the patient on Rexulti 3mg, Remeron 45mg, Trazodone 100mg, and Trintellix 20mg. Patient will undergo her fourth ECT session tomorrow morning.      Hospital Course   This is a 33 year old female with previous psychiatric diagnoses that include major depression, generalized anxiety, and traits of borderline personality disorder. She obtains four previous inpatient mental health hospitalizations, her most recent being less than 1 month ago here at [facility]. Patient was presented to [facility] ED on 10/07/19 from [facility] for evaluation of suicidal ideation. Prior to admission, the patient had been on and off of her psychiatric medications. Her symptoms of depression worsened and she had a plan to overdose on Trazodone. Patient was deemed appropriate for inpatient level of care for safety and further stabilization. While meeting with Dr. Madrigal this morning, the patient appeared severely depressed in mood and flat in affect. She was informed that her outpatient psychiatrist believes that she would benefit most from a series of ECT while inpatient. Dr. Madrigal agreed with this, thus will have patient start a series of bilateral ECT tomorrow morning, 10/09/19. In addition to this, the patient was highly encouraged to attend group sessions and spend less time isolative to her room.      Medications     Current Facility-Administered Medications Ordered in Epic  "  Medication Dose Route Frequency Last Rate Last Dose     brexpiprazole (REXULTI) tablet 3 mg  3 mg Oral Daily   3 mg at 10/15/19 0823     hydrOXYzine (ATARAX) tablet 25 mg  25 mg Oral TID PRN         ibuprofen (ADVIL/MOTRIN) tablet 200 mg  200 mg Oral Q6H PRN   200 mg at 10/14/19 2042     ketorolac (TORADOL) injection 30 mg  30 mg Intravenous Q6H PRN   30 mg at 10/14/19 0626     lactated ringers infusion  500 mL Intravenous Continuous         lidocaine 1 % 0.1-1 mL  0.1-1 mL Other Q1H PRN         lidocaine patch in PLACE   Transdermal Q8H         mirtazapine (REMERON) tablet 45 mg  45 mg Oral At Bedtime   45 mg at 10/14/19 2043     sodium chloride (PF) 0.9% PF flush 3 mL  3 mL Intracatheter q1 min prn         traZODone (DESYREL) tablet 100-200 mg  100-200 mg Oral At Bedtime   200 mg at 10/14/19 2043     vortioxetine (TRINTELLIX/BRINTELLIX) tablet 20 mg  20 mg Oral Daily   20 mg at 10/15/19 0823     No current Epic-ordered outpatient medications on file.         Allergies      No Known Allergies     Medical Review of Systems     /73   Pulse 69   Temp 97.8  F (36.6  C) (Oral)   Resp 16   Ht 1.651 m (5' 5\")   Wt 103.1 kg (227 lb 3.2 oz)   SpO2 98%   BMI 37.81 kg/m    Body mass index is 37.81 kg/m .  A 10-point review of systems was performed by Zhang Madrigal MD and is negative, no new findings.      Psychiatric Examination     Appearance Sitting in chair, dressed in hospital scrubs. Appears stated age.   Attitude Cooperative   Orientation Oriented to person, place, time   Eye Contact Fair    Speech Regular rate, rhythm, volume and tone   Language Normal   Psychomotor Behavior Normal   Mood Depressed    Affect Flat    Thought Process Goal-Oriented, Intact   Associations Intact   Thought Content Patient is currently negative for suicidal ideation, negative for plan or intent, able to contract no self harm and identify barriers to suicide.  Negative for obsessions, compulsions or psychosis.     Fund of " Knowledge Intact   Insight Poor   Judgement Poor   Attention Span & Concentration Intact    Recent & Remote Memory Intact   Gait Normal   Muscle Tone Intact        Labs     Labs reviewed.  No results found for this or any previous visit (from the past 24 hour(s)).     Impression   This is a 33 year old female with previous psychiatric diagnoses that include major depression, generalized anxiety, and traits of borderline personality disorder. The patient was pleasant, cooperative, and expressed feeling a bit less anxious and depressed in mood. No complications, concerns, or issues with her overall care or current medications were recognized per patient. There were no medication adjustments made today. Will continue on Rexulti 3mg, Remeron 45mg, Trintellix 20mg, and Trazodone 100mg. Patient will undergo her fourth ECT session tomorrow morning.       Diagnoses   1. Major depression, recurrent, severe, without psychotic features.  2. Generalized Anxiety Disorder  3. Traits of Borderline Personality Disorder     Plan     1. Explained side effects, benefits, and complications of medications to the patient, Pt gave verbal consent.  2. Medication changes: None  3. Discussed treatment plan with patient and team.  4. Projected length of stay: 7+ days   5. ECT #4 on 10/16/19    Attestation:   Alessandra LEBLANC, am serving as a scribe on 10/15/2019 to document services personally performed by Zhang Madrigal MD based on my observations and the provider's statements to me.    Patient ID:  Name: Misty Parham    MRN: [id number]  Admission: 10/7/2019   YOB: 1985

## 2019-10-16 ENCOUNTER — ANESTHESIA EVENT (OUTPATIENT)
Dept: SURGERY | Facility: CLINIC | Age: 34
End: 2019-10-16

## 2019-10-16 ENCOUNTER — ANESTHESIA (OUTPATIENT)
Dept: SURGERY | Facility: CLINIC | Age: 34
End: 2019-10-16

## 2019-10-16 PROCEDURE — 90853 GROUP PSYCHOTHERAPY: CPT

## 2019-10-16 PROCEDURE — 25000128 H RX IP 250 OP 636: Performed by: PSYCHIATRY & NEUROLOGY

## 2019-10-16 PROCEDURE — 12400000 ZZH R&B MH

## 2019-10-16 PROCEDURE — 25800030 ZZH RX IP 258 OP 636: Performed by: ANESTHESIOLOGY

## 2019-10-16 PROCEDURE — 25000132 ZZH RX MED GY IP 250 OP 250 PS 637: Performed by: PSYCHIATRY & NEUROLOGY

## 2019-10-16 PROCEDURE — 25000125 ZZHC RX 250: Performed by: ANESTHESIOLOGY

## 2019-10-16 PROCEDURE — 25000125 ZZHC RX 250: Performed by: NURSE ANESTHETIST, CERTIFIED REGISTERED

## 2019-10-16 PROCEDURE — 90870 ELECTROCONVULSIVE THERAPY: CPT

## 2019-10-16 PROCEDURE — 25000128 H RX IP 250 OP 636: Performed by: NURSE ANESTHETIST, CERTIFIED REGISTERED

## 2019-10-16 RX ORDER — KETOROLAC TROMETHAMINE 30 MG/ML
30 INJECTION, SOLUTION INTRAMUSCULAR; INTRAVENOUS EVERY 6 HOURS PRN
Status: DISCONTINUED | OUTPATIENT
Start: 2019-10-16 | End: 2019-10-18

## 2019-10-16 RX ORDER — SODIUM CHLORIDE, SODIUM LACTATE, POTASSIUM CHLORIDE, CALCIUM CHLORIDE 600; 310; 30; 20 MG/100ML; MG/100ML; MG/100ML; MG/100ML
500 INJECTION, SOLUTION INTRAVENOUS CONTINUOUS
Status: DISCONTINUED | OUTPATIENT
Start: 2019-10-16 | End: 2019-10-20

## 2019-10-16 RX ADMIN — SUCCINYLCHOLINE CHLORIDE 100 MG: 20 INJECTION, SOLUTION INTRAMUSCULAR; INTRAVENOUS; PARENTERAL at 06:33

## 2019-10-16 RX ADMIN — LIDOCAINE HYDROCHLORIDE: 10 INJECTION, SOLUTION EPIDURAL; INFILTRATION; INTRACAUDAL; PERINEURAL at 06:17

## 2019-10-16 RX ADMIN — TRAZODONE HYDROCHLORIDE 200 MG: 100 TABLET ORAL at 21:33

## 2019-10-16 RX ADMIN — VORTIOXETINE 20 MG: 20 TABLET, FILM COATED ORAL at 07:34

## 2019-10-16 RX ADMIN — MIRTAZAPINE 45 MG: 45 TABLET, FILM COATED ORAL at 21:33

## 2019-10-16 RX ADMIN — METHOHEXITAL SODIUM 100 MG: 500 INJECTION, POWDER, LYOPHILIZED, FOR SOLUTION INTRAMUSCULAR; INTRAVENOUS; RECTAL at 06:33

## 2019-10-16 RX ADMIN — BREXPIPRAZOLE 3 MG: 1 TABLET ORAL at 07:34

## 2019-10-16 RX ADMIN — SODIUM CHLORIDE, SODIUM LACTATE, POTASSIUM CHLORIDE, CALCIUM CHLORIDE: 600; 310; 30; 20 INJECTION, SOLUTION INTRAVENOUS at 06:32

## 2019-10-16 RX ADMIN — KETOROLAC TROMETHAMINE 30 MG: 30 INJECTION, SOLUTION INTRAMUSCULAR at 06:23

## 2019-10-16 RX ADMIN — SODIUM CHLORIDE, POTASSIUM CHLORIDE, SODIUM LACTATE AND CALCIUM CHLORIDE 500 ML: 600; 310; 30; 20 INJECTION, SOLUTION INTRAVENOUS at 06:21

## 2019-10-16 NOTE — ANESTHESIA PREPROCEDURE EVALUATION
Anesthesia Pre-Procedure Evaluation    Patient: Misty Parham   MRN: [id number] : 1985          Preoperative Diagnosis: * No pre-op diagnosis entered *    * No procedures listed *    Past Medical History:   Diagnosis Date     Depressive disorder      Past Surgical History:   Procedure Laterality Date     CHOLECYSTECTOMY         Anesthesia Evaluation     .             ROS/MED HX    ENT/Pulmonary:      (-) sleep apnea   Neurologic:       Cardiovascular:         METS/Exercise Tolerance:     Hematologic:         Musculoskeletal:         GI/Hepatic:        (-) GERD   Renal/Genitourinary:         Endo:     (+) Obesity (BMI 37), .      Psychiatric:     (+) psychiatric history (severe, requring ECT) depression      Infectious Disease:         Malignancy:         Other:                            Physical Exam  Normal systems: cardiovascular, pulmonary and dental    Airway   Mallampati: III  TM distance: >3 FB  Neck ROM: full    Dental     Cardiovascular       Pulmonary             Lab Results   Component Value Date    WBC 7.6 10/08/2019    HGB 13.1 10/08/2019    HCT 40.7 10/08/2019     10/08/2019     10/08/2019    POTASSIUM 4.0 10/08/2019    CHLORIDE 109 10/08/2019    CO2 28 10/08/2019    BUN 14 10/08/2019    CR 1.00 10/08/2019    GLC 95 10/08/2019    CRISTINE 9.2 10/08/2019    ALBUMIN 3.7 2019    PROTTOTAL 7.8 2019    ALT 21 2019    AST 20 2019    ALKPHOS 76 2019    BILITOTAL 0.4 2019    TSH 1.26 2019    HCG Negative 10/07/2019       Preop Vitals  BP Readings from Last 3 Encounters:   10/16/19 128/81   19 107/68   19 134/86    Pulse Readings from Last 3 Encounters:   10/15/19 80   19 80   19 79      Resp Readings from Last 3 Encounters:   10/16/19 16   19 16   19 18    SpO2 Readings from Last 3 Encounters:   10/16/19 98%   19 96%   19 96%      Temp Readings from Last 1 Encounters:   10/16/19 36.7  C (98.1  F) (Oral)     "Ht Readings from Last 1 Encounters:   10/07/19 1.651 m (5' 5\")      Wt Readings from Last 1 Encounters:   10/15/19 103.1 kg (227 lb 3.2 oz)    Estimated body mass index is 37.81 kg/m  as calculated from the following:    Height as of 10/7/19: 1.651 m (5' 5\").    Weight as of 10/15/19: 103.1 kg (227 lb 3.2 oz).       Anesthesia Plan      History & Physical Review  History and physical reviewed and following examination; no interval change.    ASA Status:  3 .    NPO Status:  > 8 hours    Plan for General with Intravenous induction.          Postoperative Care      Consents  Anesthetic plan, risks, benefits and alternatives discussed with:  Patient.  Use of blood products discussed: No .   .                   Kelvin Meier MD  "

## 2019-10-16 NOTE — PLAN OF CARE
Pt  attended 2 of 2 OT groups today. Pt transitioned to OT group with MIN encouragement, and engaged in therapeutic activity addressing functional cognition and interpersonal skills with direct VCs. Pt participated in therapeutic group activity and discussion addressing behavior change to facilitate wellness. With task set up, pt ID'd things she could do or say more often, including to make more phone calls, express more gratitude, and ask others how they are. With MIN A, problem solved how to facilitate follow through on same. Pt attended PM OT group (meal prep), however, observed only despite encouragement to participate. Pt will continue to benefit from OT intervention to address implementation of positive functional coping skills, role performance, and community reintegration.

## 2019-10-16 NOTE — ANESTHESIA POSTPROCEDURE EVALUATION
Patient: Misty Parham    * No procedures listed *    Diagnosis:* No pre-op diagnosis entered *  Diagnosis Additional Information: No value filed.    Anesthesia Type:  General    Note:  Anesthesia Post Evaluation    Patient location during evaluation: PACU  Patient participation: Able to fully participate in evaluation  Level of consciousness: awake  Pain management: adequate  Airway patency: patent  Cardiovascular status: acceptable  Respiratory status: acceptable  Hydration status: acceptable  PONV: controlled     Anesthetic complications: None          Last vitals:  Vitals:    10/16/19 0710 10/16/19 0715 10/16/19 0800   BP: 132/67  125/81   Pulse: 86  99   Resp: 18 18 15   Temp:   36.6  C (97.9  F)   SpO2: 96% 97% 96%         Electronically Signed By: Kelvin Meier MD  October 16, 2019  1:03 PM

## 2019-10-16 NOTE — PLAN OF CARE
Pleasant and cooperative.  Spent much of the shift bed resting.  Declined groups.  She thinks ECT has been helpful.  Continues to appear flat and depressed.  Denied any physical complaints. Plan is to have ECT #4 tomorrow morning.

## 2019-10-16 NOTE — PROCEDURES
.[facility] ECT Procedure Note     iMsty Parham [id number]   34 year old 1985     Patient Status: Inpatient     No Known Allergies    Weight:  227 lbs 3.2 oz          Diagnosis:     Major Depression      Indications for ECT:     Medications ineffective     Clinical Narrative:     ECT administered by thymatron machine, consent signed, side effects, risks, benefits reviewed.     Pause for the Cause:     Right patient Yes   Right procedure/laterality settings: Yes   Right diagnosis Yes      Intra-Procedure Documentation:     Date:  10/16/2019  Time:  7:42 AM    ECT #    Treatment number this series: 4   Total treatment number: 6     Type of ECT: Bilateral    ECT Medications:    Brevital: 100mg   Toradol: 30mg  Succinyl Choline: 100mg     ECT Strip Summary:   Energy Level: 80 percent  Motor Seizure Duration: 43 seconds  EEG Seizure Duration: 43 seconds    Complications: None     Plan: ECT #5 on 10/18/19

## 2019-10-16 NOTE — ANESTHESIA CARE TRANSFER NOTE
Patient: Misty Parham    * No procedures listed *    Diagnosis: * No pre-op diagnosis entered *  Diagnosis Additional Information: No value filed.    Anesthesia Type:   General     Note:  Airway :Nasal Cannula  Patient transferred to:PACU  Comments: Native airway general anesthetic.  Patient hyperventilated with 100% oxygen via mask prior to treatment.   Anesthesia induced using patent peripheral IV.    Bite block placed between molars to protect teeth and tongue.     After induction of seizure patient mask ventilated with 100% oxygen until spontaneous respirations returned.     At time of handoff to PACU, patient exhibited spontaneous respirations, adequate tidal volumes, airway patent. Oxygen via nasal cannula at 2 liters per minute to PACU in cart with siderails up, connected to wall O2 in PACU. All monitors and alarms on and functioning. Report given to PACU RN and questions answered. Handoff Report: Identifed the Patient, Identified the Reponsible Provider, Reviewed the pertinent medical history, Discussed the surgical course, Reviewed Intra-OP anesthesia mangement and issues during anesthesia, Set expectations for post-procedure period and Allowed opportunity for questions and acknowledgement of understanding      Vitals: (Last set prior to Anesthesia Care Transfer)    CRNA VITALS  10/16/2019 0610 - 10/16/2019 0640      10/16/2019             NIBP:  130/87    Pulse:  80    NIBP Mean:  107    SpO2:  100 %    Resp Rate (observed):  13    Resp Rate (set):  10                Electronically Signed By: WILLIAN Longoria CRNA  October 16, 2019  6:40 AM

## 2019-10-16 NOTE — PROGRESS NOTES
[facility] Psychiatric Progress Note       Interim History     The patient's care was discussed with the treatment team and chart notes were reviewed. Patient seen on 10/16/19 by Dr. Madrigal. The patient underwent her fourth ECT this morning where there were no issues or complications. She denies experiencing any common side effects from this procedure and will continue with her fifth session on 10/18/19. She also denies having any problems with her current medications. Dr. Madrigal does not wish to make any medication adjustments today. Aside from this, Dr. Madrigal asks the patient more about her plans after this hospital stay, such as managing her mental health and life stressors. Patient declares money, living situation, her ex-, and her job are significant life stressors at this time. Dr. Madrigal informs the patient about the book, It Will Never Happen to Me, and believes the patient would benefit from reading this book. He also encourages the patient to consider attending regular Al-Anon meetings after this hospitalization stay, believing the patient would find support and comfort by attending these meetings. Patient acknowledges.      Hospital Course   This is a 33 year old female with previous psychiatric diagnoses that include major depression, generalized anxiety, and traits of borderline personality disorder. She obtains four previous inpatient mental health hospitalizations, her most recent being less than 1 month ago here at [facility]. Patient was presented to [facility] ED on 10/07/19 from [facility] for evaluation of suicidal ideation. Prior to admission, the patient had been on and off of her psychiatric medications. Her symptoms of depression worsened and she had a plan to overdose on Trazodone. Patient was deemed appropriate for inpatient level of care for safety and further stabilization. While meeting with Dr. Madrigal this morning, the patient appeared  severely depressed in mood and flat in affect. She was informed that her outpatient psychiatrist believes that she would benefit most from a series of ECT while inpatient. Dr. Madrigal agreed with this, thus will have patient start a series of bilateral ECT tomorrow morning, 10/09/19. In addition to this, the patient was highly encouraged to attend group sessions and spend less time isolative to her room.      Medications     Current Facility-Administered Medications Ordered in Epic   Medication Dose Route Frequency Last Rate Last Dose     [Auto Hold] brexpiprazole (REXULTI) tablet 3 mg  3 mg Oral Daily   3 mg at 10/15/19 0823     [Auto Hold] hydrOXYzine (ATARAX) tablet 25 mg  25 mg Oral TID PRN         [Auto Hold] ibuprofen (ADVIL/MOTRIN) tablet 200 mg  200 mg Oral Q6H PRN   200 mg at 10/14/19 2042     ketorolac (TORADOL) injection 30 mg  30 mg Intravenous Q6H PRN   30 mg at 10/16/19 0623     lactated ringers infusion  500 mL Intravenous Continuous 25 mL/hr at 10/16/19 0621 500 mL at 10/16/19 0621     lactated ringers infusion  500 mL Intravenous Continuous         lidocaine 1 % 0.1-1 mL  0.1-1 mL Other Q1H PRN         lidocaine 1 % 0.1-1 mL  0.1-1 mL Other Q1H PRN         [Auto Hold] lidocaine patch in PLACE   Transdermal Q8H         [Auto Hold] mirtazapine (REMERON) tablet 45 mg  45 mg Oral At Bedtime   45 mg at 10/15/19 2050     sodium chloride (PF) 0.9% PF flush 3 mL  3 mL Intracatheter q1 min prn         sodium chloride (PF) 0.9% PF flush 3 mL  3 mL Intracatheter Q8H         sodium chloride (PF) 0.9% PF flush 3 mL  3 mL Intracatheter q1 min prn         [Auto Hold] traZODone (DESYREL) tablet 100-200 mg  100-200 mg Oral At Bedtime   200 mg at 10/15/19 2050     [Auto Hold] vortioxetine (TRINTELLIX/BRINTELLIX) tablet 20 mg  20 mg Oral Daily   20 mg at 10/15/19 0823     No current [facility]-ordered outpatient medications on file.         Allergies      No Known Allergies     Medical Review of Systems     /68    "Pulse 82   Temp 98.1  F (36.7  C) (Oral)   Resp 18   Ht 1.651 m (5' 5\")   Wt 103.1 kg (227 lb 3.2 oz)   SpO2 96%   BMI 37.81 kg/m    Body mass index is 37.81 kg/m .  A 10-point review of systems was performed by Zhang Madrigal MD and is negative, no new findings.      Psychiatric Examination     Appearance Sitting in chair, dressed in hospital scrubs. Appears stated age.   Attitude Cooperative   Orientation Oriented to person, place, time   Eye Contact Fair    Speech Regular rate, rhythm, volume and tone   Language Normal   Psychomotor Behavior Normal   Mood Less depressed    Affect Flat and quiet   Thought Process Goal-Oriented, Intact   Associations Intact   Thought Content Patient is currently negative for suicidal ideation, negative for plan or intent, able to contract no self harm and identify barriers to suicide.  Negative for obsessions, compulsions or psychosis.     Fund of Knowledge Intact   Insight Poor   Judgement Poor   Attention Span & Concentration Intact    Recent & Remote Memory Intact   Gait Normal   Muscle Tone Intact        Labs     Labs reviewed.  No results found for this or any previous visit (from the past 24 hour(s)).     Impression   This is a 33 year old female with previous psychiatric diagnoses that include major depression, generalized anxiety, and traits of borderline personality disorder. The patient underwent her fourth ECT session this morning, where there were no complications or problems with the procedure. She denied experiencing any common side effects and will undergo her fifth session on 10/18/19. There were no medication modifications made today. Patient will continue with her current regimen. Aside from this, the patients life stressors outside of the hospital setting were reviewed in further detail. Topics such as her job, ex-, living situation, and money issues were considered. Dr. Madrigal offered different resources for after this hospitalization, such " as the patient attending regular Al-Anon meetings. Patient acknowledged this and will look into Al-Anon further.      Diagnoses   1. Major depression, recurrent, severe, without psychotic features.  2. Generalized Anxiety Disorder  3. Traits of Borderline Personality Disorder     Plan     1. Explained side effects, benefits, and complications of medications to the patient, Pt gave verbal consent.  2. Medication changes: None  3. Discussed treatment plan with patient and team.  4. Projected length of stay: 7+ days   5. ECT #5 on 10/18/19    Attestation:   I, Alessandra Chambers, am serving as a scribe on 10/16/2019 to document services personally performed by Zhang Madrigal MD based on my observations and the provider's statements to me.    Patient ID:  Name: Misty Parham    MRN: [id number]  Admission: 10/7/2019   YOB: 1985

## 2019-10-16 NOTE — PLAN OF CARE
"Pt had ECT today and has been attending all groups. Pt denied Pain and Denied SI. Pt bed rests during down time. Pt brightened during conversation and pt said, \"I'm feeling better.\"Pt still appears flat when not engaged in conversation. Affect Blunted mood calm, hopefull.  "

## 2019-10-17 PROCEDURE — 25000132 ZZH RX MED GY IP 250 OP 250 PS 637: Performed by: PSYCHIATRY & NEUROLOGY

## 2019-10-17 PROCEDURE — 90853 GROUP PSYCHOTHERAPY: CPT

## 2019-10-17 PROCEDURE — 12400000 ZZH R&B MH

## 2019-10-17 RX ADMIN — TRAZODONE HYDROCHLORIDE 200 MG: 100 TABLET ORAL at 21:48

## 2019-10-17 RX ADMIN — VORTIOXETINE 20 MG: 20 TABLET, FILM COATED ORAL at 08:51

## 2019-10-17 RX ADMIN — MIRTAZAPINE 45 MG: 45 TABLET, FILM COATED ORAL at 21:49

## 2019-10-17 RX ADMIN — BREXPIPRAZOLE 3 MG: 1 TABLET ORAL at 08:51

## 2019-10-17 ASSESSMENT — ACTIVITIES OF DAILY LIVING (ADL)
ORAL_HYGIENE: INDEPENDENT
HYGIENE/GROOMING: INDEPENDENT
HYGIENE/GROOMING: INDEPENDENT
LAUNDRY: WITH SUPERVISION
DRESS: SCRUBS (BEHAVIORAL HEALTH)
ORAL_HYGIENE: INDEPENDENT
DRESS: SCRUBS (BEHAVIORAL HEALTH)

## 2019-10-17 NOTE — PLAN OF CARE
BEHAVIORAL TEAM DISCUSSION    Participants: MD, RN, CM, PA, OT   Progress: Improving  Anticipated length of stay: Until psychiatrically stable and ECT series completed  Continued Stay Criteria/Rationale: Patient needs to complete ECT series.   Medical/Physical: Per hospitalist consult.   Precautions: None  Behavioral Orders   Procedures    Code 1 - Restrict to Unit    Electroconvulsive therapy     Series of up to 6 treatments. Begin Date: 10/09/19     Treating Psychiatrist providing ECT:  Dr. Kown and Dr. Madrigal   Notified on:  10/08/19    Electroconvulsive therapy     Series of up to 6 treatments. Begin Date: 10/09/19     Treating Psychiatrist providing ECT:  Dr. Rayo Madrigal   Notified on:  10/08/19    Electroconvulsive therapy     Series of up to 6 treatments. Begin Date: 10/09/19     Treating Psychiatrist providing ECT:  Dr. Kwon and Dr. Madrigal   Notified on:  10/08/19    Routine Programming     As clinically indicated    Status 15     Every 15 minutes.     Plan: Continue current medications. ECT series to be completed on Monday 10/21. Discharge sometime next week.   Rationale for change in precautions or plan: Continued psychiatric stabilization.

## 2019-10-17 NOTE — PLAN OF CARE
With initial task set up and MIN verbal cues, pt participated in therapeutic group activity and discussion addressing balanced scheduling. Educated pt on four categories of activity (self-care, leisure, productivity, and social) and the importance of balance between categories for wellness with pt verbalizing understanding. Pt created pie chart of current breakdown of daily schedule using the four categories and identified where she is out of balance. Pt reports fair balance in her daily schedule, however, reports that would like to increase the amount of time she spends engaging in leisure activities. With MIN A, problem-solved strategies to address same. Pt was quiet during group but participated when prompted. Pt will continue to benefit from OT intervention to address implementation of positive functional coping skills, role performance, and community reintegration.

## 2019-10-17 NOTE — PROGRESS NOTES
[facility] Progress Note       Interim History     The patient's care was discussed with the treatment team and chart notes were reviewed. Per nursing and psych associate notes from yesterday, the patient reported to staff that she was feeling better since ECT. She spent majority of her time watching TV in the SDU lounge with others, however was withdrawn. She did not attend group sessions and was not social. Patient seen on 10/17/19 by Dr. Madrigal. The patient reports she is doing well this afternoon. She denies experiencing any complications, concerns, or issues with her overall care or medications. Dr. Madrigal does not wish to make any medication adjustments today, will continue the patient on current regimen. Again, Dr. Madrigal encourages the patient to look into Al-Anon during OT and the book It Will Never Happen to Me. Patient acknowledges this. Patient will undergo her fifth ECT session tomorrow morning.      Hospital Course   This is a 33 year old female with previous psychiatric diagnoses that include major depression, generalized anxiety, and traits of borderline personality disorder. She obtains four previous inpatient mental health hospitalizations, her most recent being less than 1 month ago here at [facility]. Patient was presented to [facility] ED on 10/07/19 from [facility] for evaluation of suicidal ideation. Prior to admission, the patient had been on and off of her psychiatric medications. Her symptoms of depression worsened and she had a plan to overdose on Trazodone. Patient was deemed appropriate for inpatient level of care for safety and further stabilization. While meeting with Dr. Madrigal this morning, the patient appeared severely depressed in mood and flat in affect. She was informed that her outpatient psychiatrist believes that she would benefit most from a series of ECT while inpatient. Dr. Madrigal agreed with this, thus will have patient  "start a series of bilateral ECT tomorrow morning, 10/09/19. In addition to this, the patient was highly encouraged to attend group sessions and spend less time isolative to her room. On 10/16, the patients life stressors outside of the hospital setting were reviewed in further detail. Topics such as her job, ex-, living situation, and money issues were considered. Dr. Madrigal offered different resources for after this hospitalization, such as the patient attending regular [facility] meetings. Patient acknowledged this and will look into [facility] further.      Medications     Current Facility-Administered Medications Ordered in Epic   Medication Dose Route Frequency Last Rate Last Dose     brexpiprazole (REXULTI) tablet 3 mg  3 mg Oral Daily   3 mg at 10/17/19 0851     hydrOXYzine (ATARAX) tablet 25 mg  25 mg Oral TID PRN         ibuprofen (ADVIL/MOTRIN) tablet 200 mg  200 mg Oral Q6H PRN   200 mg at 10/14/19 2042     ketorolac (TORADOL) injection 30 mg  30 mg Intravenous Q6H PRN   30 mg at 10/16/19 0623     lactated ringers infusion  500 mL Intravenous Continuous 25 mL/hr at 10/16/19 0621 500 mL at 10/16/19 0621     lidocaine 1 % 0.1-1 mL  0.1-1 mL Other Q1H PRN         lidocaine patch in PLACE   Transdermal Q8H         mirtazapine (REMERON) tablet 45 mg  45 mg Oral At Bedtime   45 mg at 10/16/19 2133     sodium chloride (PF) 0.9% PF flush 3 mL  3 mL Intracatheter q1 min prn         sodium chloride (PF) 0.9% PF flush 3 mL  3 mL Intracatheter Q8H         traZODone (DESYREL) tablet 100-200 mg  100-200 mg Oral At Bedtime   200 mg at 10/16/19 2133     vortioxetine (TRINTELLIX/BRINTELLIX) tablet 20 mg  20 mg Oral Daily   20 mg at 10/17/19 0851     No current [facility]-ordered outpatient medications on file.         Allergies      No Known Allergies     Medical Review of Systems     /70   Pulse 72   Temp 98.1  F (36.7  C) (Oral)   Resp 15   Ht 1.651 m (5' 5\")   Wt 103.1 kg (227 lb 3.2 oz)   SpO2 97%   BMI " 37.81 kg/m    Body mass index is 37.81 kg/m .  A 10-point review of systems was performed by Zhang Madrigal MD and is negative, no new findings.      Psychiatric Examination     Appearance Sitting in chair, dressed in hospital scrubs. Appears stated age.   Attitude Cooperative   Orientation Oriented to person, place, time   Eye Contact Fair    Speech Regular rate, rhythm, volume and tone   Language Normal   Psychomotor Behavior Normal   Mood Less depressed    Affect Flat and quiet   Thought Process Goal-Oriented, Intact   Associations Intact   Thought Content Patient is currently negative for suicidal ideation, negative for plan or intent, able to contract no self harm and identify barriers to suicide.  Negative for obsessions, compulsions or psychosis.     Fund of Knowledge Intact   Insight Poor   Judgement Poor   Attention Span & Concentration Intact    Recent & Remote Memory Intact   Gait Normal   Muscle Tone Intact        Labs     Labs reviewed.  No results found for this or any previous visit (from the past 24 hour(s)).     Impression   This is a 33 year old female with previous psychiatric diagnoses that include major depression, generalized anxiety, and traits of borderline personality disorder. The patient was again quiet, cooperative, and appeared a little less depressed in mood while meeting with Dr. Madrigal this afternoon. No complications or concerns were presented by the patient during interview. No medications were adjusted, will continue the patient on the current regimen. The patient was again encouraged by Dr. Madrigal to look into Al-Anon and the book It Will Never Happen to Me. In addition to this, the patient will undergo her fifth ECT session tomorrow morning.      Diagnoses   1. Major depression, recurrent, severe, without psychotic features.  2. Generalized Anxiety Disorder  3. Traits of Borderline Personality Disorder     Plan     1. Explained side effects, benefits, and  complications of medications to the patient, Pt gave verbal consent.  2. Medication changes: None  3. Discussed treatment plan with patient and team.  4. Projected length of stay: 7+ days   5. ECT #5 on 10/18/19    Attestation:   I, Alessandra Chambers, am serving as a scribe on 10/17/2019 to document services personally performed by Zhang Madrigal MD based on my observations and the provider's statements to me.    Patient ID:  Name: Misty Parham    MRN: [id number]  Admission: 10/7/2019   YOB: 1985

## 2019-10-17 NOTE — PLAN OF CARE
" Pt spent the majority of the shift in the lounge watching tv but was withdrawn and presents with a flat affect. She reports she feels that she is doing better with the ECT, reports \"it is good.\" She did not attend any evening groups and was not social with her peers.   "

## 2019-10-17 NOTE — PLAN OF CARE
Pt spent most of the shift in the lounge and attending groups. She is generally withdrawn and does not socialize with peers. Affect is flat, mood is sad and depressed. Insight is fair and judgement is impaired. Thinking is distractible. States ECT seems to be helping and there is some improvement in her mood. Pt denies SI at this time. Med compliant.

## 2019-10-17 NOTE — PROGRESS NOTES
[facility] Psychiatric Progress Note       Interim History     The patient's care was discussed with the treatment team and chart notes were reviewed. Per nursing and psych associate notes from yesterday, the patient reported to staff that she was feeling better since ECT. She spent majority of her time watching TV in the SDU lounge with others, however was withdrawn. She did not attend group sessions and was not social. Patient seen on 10/17/19 by Dr. Madrigal.     Patient will undergo her fifth ECT session tomorrow morning.      Hospital Course   This is a 33 year old female with previous psychiatric diagnoses that include major depression, generalized anxiety, and traits of borderline personality disorder. She obtains four previous inpatient mental health hospitalizations, her most recent being less than 1 month ago here at [facility]. Patient was presented to [facility] ED on 10/07/19 from [facility] for evaluation of suicidal ideation. Prior to admission, the patient had been on and off of her psychiatric medications. Her symptoms of depression worsened and she had a plan to overdose on Trazodone. Patient was deemed appropriate for inpatient level of care for safety and further stabilization. While meeting with Dr. Madrigal this morning, the patient appeared severely depressed in mood and flat in affect. She was informed that her outpatient psychiatrist believes that she would benefit most from a series of ECT while inpatient. Dr. Madrigal agreed with this, thus will have patient start a series of bilateral ECT tomorrow morning, 10/09/19. In addition to this, the patient was highly encouraged to attend group sessions and spend less time isolative to her room. On 10/16, the patients life stressors outside of the hospital setting were reviewed in further detail. Topics such as her job, ex-, living situation, and money issues were considered. Dr. Madrigal offered different resources  "for after this hospitalization, such as the patient attending regular Al-Anon meetings. Patient acknowledged this and will look into [facility] further.      Medications     Current Facility-Administered Medications Ordered in Epic   Medication Dose Route Frequency Last Rate Last Dose     brexpiprazole (REXULTI) tablet 3 mg  3 mg Oral Daily   3 mg at 10/17/19 0851     hydrOXYzine (ATARAX) tablet 25 mg  25 mg Oral TID PRN         ibuprofen (ADVIL/MOTRIN) tablet 200 mg  200 mg Oral Q6H PRN   200 mg at 10/14/19 2042     ketorolac (TORADOL) injection 30 mg  30 mg Intravenous Q6H PRN   30 mg at 10/16/19 0623     lactated ringers infusion  500 mL Intravenous Continuous 25 mL/hr at 10/16/19 0621 500 mL at 10/16/19 0621     lidocaine 1 % 0.1-1 mL  0.1-1 mL Other Q1H PRN         lidocaine patch in PLACE   Transdermal Q8H         mirtazapine (REMERON) tablet 45 mg  45 mg Oral At Bedtime   45 mg at 10/16/19 2133     sodium chloride (PF) 0.9% PF flush 3 mL  3 mL Intracatheter q1 min prn         sodium chloride (PF) 0.9% PF flush 3 mL  3 mL Intracatheter Q8H         traZODone (DESYREL) tablet 100-200 mg  100-200 mg Oral At Bedtime   200 mg at 10/16/19 2133     vortioxetine (TRINTELLIX/BRINTELLIX) tablet 20 mg  20 mg Oral Daily   20 mg at 10/17/19 0851     No current [facility]-ordered outpatient medications on file.         Allergies      No Known Allergies     Medical Review of Systems     /70   Pulse 72   Temp 98.1  F (36.7  C) (Oral)   Resp 15   Ht 1.651 m (5' 5\")   Wt 103.1 kg (227 lb 3.2 oz)   SpO2 97%   BMI 37.81 kg/m    Body mass index is 37.81 kg/m .  A 10-point review of systems was performed by Zhang Madrigal MD and is negative, no new findings.      Psychiatric Examination     Appearance Sitting in chair, dressed in hospital scrubs. Appears stated age.   Attitude Cooperative   Orientation Oriented to person, place, time   Eye Contact Fair    Speech Regular rate, rhythm, volume and tone   Language Normal "   Psychomotor Behavior Normal   Mood Less depressed    Affect Flat and quiet   Thought Process Goal-Oriented, Intact   Associations Intact   Thought Content Patient is currently negative for suicidal ideation, negative for plan or intent, able to contract no self harm and identify barriers to suicide.  Negative for obsessions, compulsions or psychosis.     Fund of Knowledge Intact   Insight Poor   Judgement Poor   Attention Span & Concentration Intact    Recent & Remote Memory Intact   Gait Normal   Muscle Tone Intact        Labs     Labs reviewed.  No results found for this or any previous visit (from the past 24 hour(s)).     Impression   This is a 33 year old female with previous psychiatric diagnoses that include major depression, generalized anxiety, and traits of borderline personality disorder.     Patient will undergo her fifth ECT session tomorrow morning.      Diagnoses   1. Major depression, recurrent, severe, without psychotic features.  2. Generalized Anxiety Disorder  3. Traits of Borderline Personality Disorder     Plan     1. Explained side effects, benefits, and complications of medications to the patient, Pt gave verbal consent.  2. Medication changes: None  3. Discussed treatment plan with patient and team.  4. Projected length of stay: 7+ days   5. ECT #5 on 10/18/19    Attestation:   Alessandra LEBLANC, am serving as a scribe on 10/17/2019 to document services personally performed by Zhang Madrigal MD based on my observations and the provider's statements to me.    Patient ID:  Name: Misty Parham    MRN: [id number]  Admission: 10/7/2019   YOB: 1985

## 2019-10-18 ENCOUNTER — ANESTHESIA EVENT (OUTPATIENT)
Dept: SURGERY | Facility: CLINIC | Age: 34
End: 2019-10-18

## 2019-10-18 ENCOUNTER — ANESTHESIA (OUTPATIENT)
Dept: SURGERY | Facility: CLINIC | Age: 34
End: 2019-10-18

## 2019-10-18 PROCEDURE — 25000128 H RX IP 250 OP 636: Performed by: NURSE ANESTHETIST, CERTIFIED REGISTERED

## 2019-10-18 PROCEDURE — 25800030 ZZH RX IP 258 OP 636: Performed by: ANESTHESIOLOGY

## 2019-10-18 PROCEDURE — 40000010 ZZH STATISTIC ANES STAT CODE-CRNA PER MINUTE

## 2019-10-18 PROCEDURE — 25000125 ZZHC RX 250: Performed by: NURSE ANESTHETIST, CERTIFIED REGISTERED

## 2019-10-18 PROCEDURE — 37000008 ZZH ANESTHESIA TECHNICAL FEE, 1ST 30 MIN

## 2019-10-18 PROCEDURE — 25000128 H RX IP 250 OP 636: Performed by: PSYCHIATRY & NEUROLOGY

## 2019-10-18 PROCEDURE — 90853 GROUP PSYCHOTHERAPY: CPT

## 2019-10-18 PROCEDURE — 25000125 ZZHC RX 250: Performed by: ANESTHESIOLOGY

## 2019-10-18 PROCEDURE — 25000132 ZZH RX MED GY IP 250 OP 250 PS 637: Performed by: PSYCHIATRY & NEUROLOGY

## 2019-10-18 PROCEDURE — 12400000 ZZH R&B MH

## 2019-10-18 PROCEDURE — 90870 ELECTROCONVULSIVE THERAPY: CPT

## 2019-10-18 RX ORDER — KETOROLAC TROMETHAMINE 30 MG/ML
30 INJECTION, SOLUTION INTRAMUSCULAR; INTRAVENOUS EVERY 6 HOURS PRN
Status: DISCONTINUED | OUTPATIENT
Start: 2019-10-18 | End: 2019-10-21

## 2019-10-18 RX ADMIN — VORTIOXETINE 20 MG: 20 TABLET, FILM COATED ORAL at 08:12

## 2019-10-18 RX ADMIN — BREXPIPRAZOLE 3 MG: 1 TABLET ORAL at 08:11

## 2019-10-18 RX ADMIN — KETOROLAC TROMETHAMINE 30 MG: 30 INJECTION, SOLUTION INTRAMUSCULAR at 06:16

## 2019-10-18 RX ADMIN — SUCCINYLCHOLINE CHLORIDE 100 MG: 20 INJECTION, SOLUTION INTRAMUSCULAR; INTRAVENOUS; PARENTERAL at 07:04

## 2019-10-18 RX ADMIN — LIDOCAINE HYDROCHLORIDE 0.1 ML: 10 INJECTION, SOLUTION EPIDURAL; INFILTRATION; INTRACAUDAL; PERINEURAL at 06:17

## 2019-10-18 RX ADMIN — SODIUM CHLORIDE, POTASSIUM CHLORIDE, SODIUM LACTATE AND CALCIUM CHLORIDE 500 ML: 600; 310; 30; 20 INJECTION, SOLUTION INTRAVENOUS at 06:17

## 2019-10-18 RX ADMIN — TRAZODONE HYDROCHLORIDE 200 MG: 100 TABLET ORAL at 20:28

## 2019-10-18 RX ADMIN — METHOHEXITAL SODIUM 100 MG: 500 INJECTION, POWDER, LYOPHILIZED, FOR SOLUTION INTRAMUSCULAR; INTRAVENOUS; RECTAL at 07:04

## 2019-10-18 RX ADMIN — MIRTAZAPINE 45 MG: 45 TABLET, FILM COATED ORAL at 20:28

## 2019-10-18 ASSESSMENT — ACTIVITIES OF DAILY LIVING (ADL)
ORAL_HYGIENE: INDEPENDENT
ORAL_HYGIENE: INDEPENDENT
DRESS: INDEPENDENT
DRESS: INDEPENDENT;SCRUBS (BEHAVIORAL HEALTH)
LAUNDRY: WITH SUPERVISION
HYGIENE/GROOMING: INDEPENDENT
LAUNDRY: WITH SUPERVISION
HYGIENE/GROOMING: INDEPENDENT

## 2019-10-18 ASSESSMENT — LIFESTYLE VARIABLES: TOBACCO_USE: 0

## 2019-10-18 NOTE — ANESTHESIA PREPROCEDURE EVALUATION
Anesthesia Pre-Procedure Evaluation    Patient: Misty Parham   MRN: [id number] : 1985          Preoperative Diagnosis: * No pre-op diagnosis entered *    * No procedures listed *    Past Medical History:   Diagnosis Date     Depressive disorder      Past Surgical History:   Procedure Laterality Date     CHOLECYSTECTOMY         Anesthesia Evaluation     . Pt has had prior anesthetic. Type: General    No history of anesthetic complications          ROS/MED HX    ENT/Pulmonary:      (-) tobacco use and sleep apnea   Neurologic:      (-) Delerium   Cardiovascular:     (+) ----. : . . . :. . Previous cardiac testing date:results:date: results:ECG reviewed date:19 results:NSR date: results:          METS/Exercise Tolerance:  >4 METS   Hematologic:         Musculoskeletal:         GI/Hepatic:        (-) GERD   Renal/Genitourinary:         Endo:     (+) Obesity (BMI 37), .      Psychiatric:     (+) psychiatric history (severe, requring ECT) depression      Infectious Disease:         Malignancy:         Other:    (+) No chance of pregnancy                         Physical Exam  Normal systems: cardiovascular, pulmonary and dental    Airway   Mallampati: III  TM distance: >3 FB  Neck ROM: full    Dental     Cardiovascular       Pulmonary             Lab Results   Component Value Date    WBC 7.6 10/08/2019    HGB 13.1 10/08/2019    HCT 40.7 10/08/2019     10/08/2019     10/08/2019    POTASSIUM 4.0 10/08/2019    CHLORIDE 109 10/08/2019    CO2 28 10/08/2019    BUN 14 10/08/2019    CR 1.00 10/08/2019    GLC 95 10/08/2019    CRISTINE 9.2 10/08/2019    ALBUMIN 3.7 2019    PROTTOTAL 7.8 2019    ALT 21 2019    AST 20 2019    ALKPHOS 76 2019    BILITOTAL 0.4 2019    TSH 1.26 2019    HCG Negative 10/07/2019       Preop Vitals  BP Readings from Last 3 Encounters:   10/18/19 115/78   19 107/68   19 134/86    Pulse Readings from Last 3 Encounters:   10/17/19 76  "  09/20/19 80   07/03/19 79      Resp Readings from Last 3 Encounters:   10/18/19 16   09/20/19 16   07/03/19 18    SpO2 Readings from Last 3 Encounters:   10/18/19 97%   09/20/19 96%   07/03/19 96%      Temp Readings from Last 1 Encounters:   10/18/19 36.4  C (97.5  F) (Oral)    Ht Readings from Last 1 Encounters:   10/07/19 1.651 m (5' 5\")      Wt Readings from Last 1 Encounters:   10/15/19 103.1 kg (227 lb 3.2 oz)    Estimated body mass index is 37.81 kg/m  as calculated from the following:    Height as of this encounter: 1.651 m (5' 5\").    Weight as of this encounter: 103.1 kg (227 lb 3.2 oz).       Anesthesia Plan      History & Physical Review  History and physical reviewed and following examination; no interval change.    ASA Status:  3 .    NPO Status:  > 8 hours    Plan for General with Intravenous induction.          Postoperative Care      Consents  Anesthetic plan, risks, benefits and alternatives discussed with:  Patient.  Use of blood products discussed: No .   .                 Tucker Cornelius MD  "

## 2019-10-18 NOTE — PROGRESS NOTES
[facility] Psychiatric Progress Note       Interim History     The patient's care was discussed with the treatment team and chart notes were reviewed. Patient seen on 10/18/19 by Dr. Madrigal. The patient underwent her fifth ECT session this morning, where there were no complications or problems. She denies experiencing any common side effects, such as nausea, headaches, or significant confusion. Patient will undergo her sixth and final session on 10/21/19. Aside from this, the patient denies any issues or concerns in regards of her current medications or overall care while here on [address]. Dr. Madrigal does not wish to make any medication adjustments today.      Hospital Course   This is a 33 year old female with previous psychiatric diagnoses that include major depression, generalized anxiety, and traits of borderline personality disorder. She obtains four previous inpatient mental health hospitalizations, her most recent being less than 1 month ago here at [facility]. Patient was presented to [facility] ED on 10/07/19 from [facility] for evaluation of suicidal ideation. Prior to admission, the patient had been on and off of her psychiatric medications. Her symptoms of depression worsened and she had a plan to overdose on Trazodone. Patient was deemed appropriate for inpatient level of care for safety and further stabilization. While meeting with Dr. Madrigal this morning, the patient appeared severely depressed in mood and flat in affect. She was informed that her outpatient psychiatrist believes that she would benefit most from a series of ECT while inpatient. Dr. Madrigal agreed with this, thus will have patient start a series of bilateral ECT tomorrow morning, 10/09/19. In addition to this, the patient was highly encouraged to attend group sessions and spend less time isolative to her room. On 10/16, the patients life stressors outside of the hospital setting were reviewed in  "further detail. Topics such as her job, ex-, living situation, and money issues were considered. Dr. Madrigal offered different resources for after this hospitalization, such as the patient attending regular Al-Anon meetings. Patient acknowledged this and will look into [facility].      Medications     Current Facility-Administered Medications Ordered in Epic   Medication Dose Route Frequency Last Rate Last Dose     [Auto Hold] brexpiprazole (REXULTI) tablet 3 mg  3 mg Oral Daily   3 mg at 10/17/19 0851     [Auto Hold] hydrOXYzine (ATARAX) tablet 25 mg  25 mg Oral TID PRN         [Auto Hold] ibuprofen (ADVIL/MOTRIN) tablet 200 mg  200 mg Oral Q6H PRN   200 mg at 10/14/19 2042     ketorolac (TORADOL) injection 30 mg  30 mg Intravenous Q6H PRN   30 mg at 10/18/19 0616     [Auto Hold] ketorolac (TORADOL) injection 30 mg  30 mg Intravenous Q6H PRN   30 mg at 10/16/19 0623     lactated ringers infusion  500 mL Intravenous Continuous 25 mL/hr at 10/18/19 0617 500 mL at 10/18/19 0617     lidocaine 1 % 0.1-1 mL  0.1-1 mL Other Q1H PRN   0.1 mL at 10/18/19 0617     lidocaine patch in PLACE   Transdermal Q8H         [Auto Hold] lidocaine patch in PLACE   Transdermal Q8H         [Auto Hold] mirtazapine (REMERON) tablet 45 mg  45 mg Oral At Bedtime   45 mg at 10/17/19 2149     sodium chloride (PF) 0.9% PF flush 3 mL  3 mL Intracatheter q1 min prn         sodium chloride (PF) 0.9% PF flush 3 mL  3 mL Intracatheter Q8H         [Auto Hold] traZODone (DESYREL) tablet 100-200 mg  100-200 mg Oral At Bedtime   200 mg at 10/17/19 2148     [Auto Hold] vortioxetine (TRINTELLIX/BRINTELLIX) tablet 20 mg  20 mg Oral Daily   20 mg at 10/17/19 0851     No current [facility]-ordered outpatient medications on file.         Allergies      No Known Allergies     Medical Review of Systems     /78 (BP Location: Left arm)   Pulse 76   Temp 97.5  F (36.4  C) (Oral)   Resp 16   Ht 1.651 m (5' 5\")   Wt 103.1 kg (227 lb 3.2 oz)   " SpO2 97%   BMI 37.81 kg/m    Body mass index is 37.81 kg/m .  A 10-point review of systems was performed by Zhang Madrigal MD and is negative, no new findings.      Psychiatric Examination     Appearance Sitting in chair, dressed in hospital scrubs. Appears stated age.   Attitude Cooperative   Orientation Oriented to person, place, time   Eye Contact Fair    Speech Regular rate, rhythm, volume and tone   Language Normal   Psychomotor Behavior Normal   Mood Less depressed    Affect Flat and quiet   Thought Process Goal-Oriented, Intact   Associations Intact   Thought Content Patient is currently negative for suicidal ideation, negative for plan or intent, able to contract no self harm and identify barriers to suicide.  Negative for obsessions, compulsions or psychosis.     Fund of Knowledge Intact   Insight Poor   Judgement Poor   Attention Span & Concentration Intact    Recent & Remote Memory Intact   Gait Normal   Muscle Tone Intact        Labs     Labs reviewed.  No results found for this or any previous visit (from the past 24 hour(s)).     Impression   This is a 33 year old female with previous psychiatric diagnoses that include major depression, generalized anxiety, and traits of borderline personality disorder. The patient underwent her fifth ECT session this morning where there were no complications or concerns. She denied experiencing any major side effects such as headaches, nausea, or confusion. Patient will undergo her sixth and final session on 10/21/19. Aside from this, Dr. Madrigal made no medication adjustments this morning. Will continue patient on current regimen.      Diagnoses   1. Major depression, recurrent, severe, without psychotic features.  2. Generalized Anxiety Disorder  3. Traits of Borderline Personality Disorder     Plan     1. Explained side effects, benefits, and complications of medications to the patient, Pt gave verbal consent.  2. Medication changes: None  3. Discussed  treatment plan with patient and team.  4. Projected length of stay: 7+ days   5. ECT #6 on 10/21/19    Attestation:   I, Alessandra Chambers, am serving as a scribe on 10/18/2019 to document services personally performed by Zhang Madrigal MD based on my observations and the provider's statements to me.    Patient ID:  Name: Misty Parham    MRN: [id number]  Admission: 10/7/2019   YOB: 1985

## 2019-10-18 NOTE — PLAN OF CARE
Problem: Depressive Symptoms  Goal: Depressive Symptoms  Description  1. Patient will take medication as ordered  2. Patient insight will improve and work through grief issues  3. Patient will attend therapies and gain new insights   10/18/2019 1244 by Domenica Grimes  Outcome: No Change  Flowsheets (Taken 10/18/2019 1244)  Depressive Symptoms Assessed: all  Depressive Symptoms Present: affect; mood; insight  Note:   Pt presents with a flat affect and depressed mood. Pt is more visible in the milleau, and spends the majority of the day in the lounge. She attends daytime groups, but engages with others minimally and remains withdrawn. Pt's thought processes are intact, but insight and judgement are impaired. She received ECT #5 this morning and continues to feel hopeful about its effects. Pt denies current Si and is med-compliant.

## 2019-10-18 NOTE — PLAN OF CARE
Patient presents with a sad affect and depressed mood. Not social with peers or staff. Out in the lounge, but withdrawn. Attends groups with minimal participation. Insight appropriate, judgement impaired. ECT #5 on tomorrow.

## 2019-10-18 NOTE — PROCEDURES
.[facility] ECT Procedure Note     Misty Parham [id number]   34 year old 1985     Patient Status: Inpatient     No Known Allergies    Weight:  227 lbs 3.2 oz          Diagnosis:     Major Depression      Indications for ECT:     Medications ineffective     Clinical Narrative:     ECT administered by thymatron machine, consent signed, side effects, risks, benefits reviewed.     Pause for the Cause:     Right patient Yes   Right procedure/laterality settings: Yes   Right diagnosis Yes      Intra-Procedure Documentation:     Date:  10/18/2019  Time:  7:42 AM    ECT #    Treatment number this series: 5   Total treatment number: 6     Type of ECT: Bilateral    ECT Medications:    Brevital: 100mg   Toradol: 30mg  Succinyl Choline: 100mg     ECT Strip Summary:   Energy Level: 90 percent  Motor Seizure Duration: 43 seconds  EEG Seizure Duration: 43 seconds    Complications: None     Plan: ECT #6 on 10/21/19

## 2019-10-18 NOTE — ANESTHESIA CARE TRANSFER NOTE
Patient: Misty Parham    * No procedures listed *    Diagnosis: * No pre-op diagnosis entered *  Diagnosis Additional Information: No value filed.    Anesthesia Type:   General     Note:  Airway :Nasal Cannula  Patient transferred to:PACU  Comments: Native airway general anesthetic.  Patient hyperventilated with 100% oxygen via mask prior to treatment.   Anesthesia induced using patent peripheral IV.    Bite block placed between molars to protect teeth and tongue.     After induction of seizure patient mask ventilated with 100% oxygen until spontaneous respirations returned.     At time of handoff to PACU, patient exhibited spontaneous respirations, adequate tidal volumes, airway patent. Oxygen via nasal cannula at 2 liters per minute to PACU in cart with siderails up, connected to wall O2 in PACU. All monitors and alarms on and functioning. Report given to PACU RN and questions answered. Handoff Report: Identifed the Patient, Identified the Reponsible Provider, Reviewed the pertinent medical history, Discussed the surgical course, Reviewed Intra-OP anesthesia mangement and issues during anesthesia, Set expectations for post-procedure period and Allowed opportunity for questions and acknowledgement of understanding      Vitals: (Last set prior to Anesthesia Care Transfer)    CRNA VITALS  10/18/2019 0645 - 10/18/2019 0715      10/18/2019             Pulse:  73    SpO2:  99 %    Resp Rate (observed):  14    Resp Rate (set):  10                Electronically Signed By: WILLIAN Bautista CRNA  October 18, 2019  7:15 AM

## 2019-10-19 PROCEDURE — 25000132 ZZH RX MED GY IP 250 OP 250 PS 637: Performed by: PSYCHIATRY & NEUROLOGY

## 2019-10-19 PROCEDURE — 12400000 ZZH R&B MH

## 2019-10-19 RX ADMIN — VORTIOXETINE 20 MG: 20 TABLET, FILM COATED ORAL at 09:08

## 2019-10-19 RX ADMIN — TRAZODONE HYDROCHLORIDE 200 MG: 100 TABLET ORAL at 20:42

## 2019-10-19 RX ADMIN — MIRTAZAPINE 45 MG: 45 TABLET, FILM COATED ORAL at 20:42

## 2019-10-19 RX ADMIN — BREXPIPRAZOLE 3 MG: 1 TABLET ORAL at 09:08

## 2019-10-19 ASSESSMENT — ACTIVITIES OF DAILY LIVING (ADL)
DRESS: INDEPENDENT
HYGIENE/GROOMING: INDEPENDENT
LAUNDRY: WITH SUPERVISION
ORAL_HYGIENE: INDEPENDENT

## 2019-10-19 NOTE — PLAN OF CARE
Problem: Suicidal Behavior  Goal: Suicidal Behavior is Absent or Managed  Outcome: Improving  Note:   Pt presents with a flat affect and calm mood. Pt thought process and insight improving. Pt spent most of the shift in the lounge watching Tv, still minimally social, and was bed resting otherwise. Pt states she is feeling okay, denies Si.

## 2019-10-19 NOTE — PLAN OF CARE
Problem: Depressive Symptoms  Goal: Depressive Symptoms  Description  1. Patient will take medication as ordered  2. Patient insight will improve and work through grief issues  3. Patient will attend therapies and gain new insights   10/18/2019 2150 by Domenica Grimes  Outcome: No Change  Flowsheets (Taken 10/18/2019 2150)  Depressive Symptoms Assessed: all  Depressive Symptoms Present: affect; mood; insight  Note:   Pt presents with a blunted affect and depressed mood. Pt completed ECT #5 this morning, and states that she is still hopeful about its effects. Pt has been more visible in the [place] this evening, but continues to be withdrawn during conversations. She spent most of the shift watching TV in the lounge. Her thought processes are intact, and insight and judgement are impaired. Pt denies Si and is med-compliant.

## 2019-10-20 PROCEDURE — 25000132 ZZH RX MED GY IP 250 OP 250 PS 637: Performed by: PSYCHIATRY & NEUROLOGY

## 2019-10-20 PROCEDURE — 12400000 ZZH R&B MH

## 2019-10-20 RX ADMIN — BREXPIPRAZOLE 3 MG: 1 TABLET ORAL at 09:03

## 2019-10-20 RX ADMIN — TRAZODONE HYDROCHLORIDE 200 MG: 100 TABLET ORAL at 21:11

## 2019-10-20 RX ADMIN — MIRTAZAPINE 45 MG: 45 TABLET, FILM COATED ORAL at 21:11

## 2019-10-20 RX ADMIN — VORTIOXETINE 20 MG: 20 TABLET, FILM COATED ORAL at 09:03

## 2019-10-20 ASSESSMENT — ACTIVITIES OF DAILY LIVING (ADL)
LAUNDRY: WITH SUPERVISION
HYGIENE/GROOMING: INDEPENDENT
ORAL_HYGIENE: INDEPENDENT
DRESS: SCRUBS (BEHAVIORAL HEALTH)

## 2019-10-20 NOTE — PLAN OF CARE
Pt presents social and pleasant on unit spending time in lounge and in [place] visiting with . Affect is flat and mood is calm/depressed. Pt states her mood is better and that she is happier. Body language and tone of patient do not match as she is slow to respond, drooping eyes/mouth and sad/flat tone. Pt says she feels as though she is ready to go back to her group home on Monday. Denies any SI this shift.

## 2019-10-20 NOTE — PLAN OF CARE
"  Problem: Suicidal Behavior  Goal: Suicidal Behavior is Absent or Managed  Outcome: Improving  Note:   Pt presents with a flat affect and sad mood. Pt thought process and insight impaired. Pt was visible this shift, but still socially withdrawn. During community meeting this morning, when staff asked what her goal was for today, it took pt a while to respond and eventually quietly said \"go to groups\". Pt states she is fine and denies any Si.     "

## 2019-10-21 ENCOUNTER — ANESTHESIA EVENT (OUTPATIENT)
Dept: SURGERY | Facility: CLINIC | Age: 34
End: 2019-10-21

## 2019-10-21 ENCOUNTER — ANESTHESIA (OUTPATIENT)
Dept: SURGERY | Facility: CLINIC | Age: 34
End: 2019-10-21

## 2019-10-21 PROCEDURE — 25000132 ZZH RX MED GY IP 250 OP 250 PS 637: Performed by: PSYCHIATRY & NEUROLOGY

## 2019-10-21 PROCEDURE — 25000128 H RX IP 250 OP 636: Performed by: NURSE ANESTHETIST, CERTIFIED REGISTERED

## 2019-10-21 PROCEDURE — 25000128 H RX IP 250 OP 636: Performed by: PSYCHIATRY & NEUROLOGY

## 2019-10-21 PROCEDURE — 12400000 ZZH R&B MH

## 2019-10-21 PROCEDURE — 25800030 ZZH RX IP 258 OP 636: Performed by: ANESTHESIOLOGY

## 2019-10-21 PROCEDURE — 25000125 ZZHC RX 250: Performed by: ANESTHESIOLOGY

## 2019-10-21 PROCEDURE — 90853 GROUP PSYCHOTHERAPY: CPT

## 2019-10-21 PROCEDURE — 90870 ELECTROCONVULSIVE THERAPY: CPT

## 2019-10-21 PROCEDURE — 25000125 ZZHC RX 250: Performed by: NURSE ANESTHETIST, CERTIFIED REGISTERED

## 2019-10-21 RX ORDER — SODIUM CHLORIDE, SODIUM LACTATE, POTASSIUM CHLORIDE, CALCIUM CHLORIDE 600; 310; 30; 20 MG/100ML; MG/100ML; MG/100ML; MG/100ML
500 INJECTION, SOLUTION INTRAVENOUS CONTINUOUS
Status: DISCONTINUED | OUTPATIENT
Start: 2019-10-21 | End: 2019-10-21

## 2019-10-21 RX ORDER — KETOROLAC TROMETHAMINE 30 MG/ML
30 INJECTION, SOLUTION INTRAMUSCULAR; INTRAVENOUS EVERY 6 HOURS PRN
Status: DISCONTINUED | OUTPATIENT
Start: 2019-10-21 | End: 2019-10-21

## 2019-10-21 RX ORDER — KETOROLAC TROMETHAMINE 30 MG/ML
30 INJECTION, SOLUTION INTRAMUSCULAR; INTRAVENOUS EVERY 6 HOURS PRN
Status: CANCELLED
Start: 2019-10-21

## 2019-10-21 RX ADMIN — SODIUM CHLORIDE, SODIUM LACTATE, POTASSIUM CHLORIDE, CALCIUM CHLORIDE 500 ML: 600; 310; 30; 20 INJECTION, SOLUTION INTRAVENOUS at 06:15

## 2019-10-21 RX ADMIN — LIDOCAINE HYDROCHLORIDE 0.1 ML: 10 INJECTION, SOLUTION EPIDURAL; INFILTRATION; INTRACAUDAL; PERINEURAL at 06:15

## 2019-10-21 RX ADMIN — BREXPIPRAZOLE 3 MG: 1 TABLET ORAL at 07:52

## 2019-10-21 RX ADMIN — KETOROLAC TROMETHAMINE 30 MG: 30 INJECTION, SOLUTION INTRAMUSCULAR at 06:37

## 2019-10-21 RX ADMIN — SODIUM CHLORIDE, SODIUM LACTATE, POTASSIUM CHLORIDE, CALCIUM CHLORIDE: 600; 310; 30; 20 INJECTION, SOLUTION INTRAVENOUS at 06:42

## 2019-10-21 RX ADMIN — MIRTAZAPINE 45 MG: 45 TABLET, FILM COATED ORAL at 21:09

## 2019-10-21 RX ADMIN — VORTIOXETINE 20 MG: 20 TABLET, FILM COATED ORAL at 07:52

## 2019-10-21 RX ADMIN — SUCCINYLCHOLINE CHLORIDE 100 MG: 20 INJECTION, SOLUTION INTRAMUSCULAR; INTRAVENOUS; PARENTERAL at 06:45

## 2019-10-21 RX ADMIN — TRAZODONE HYDROCHLORIDE 200 MG: 100 TABLET ORAL at 21:09

## 2019-10-21 RX ADMIN — METHOHEXITAL SODIUM 100 MG: 500 INJECTION, POWDER, LYOPHILIZED, FOR SOLUTION INTRAMUSCULAR; INTRAVENOUS; RECTAL at 06:45

## 2019-10-21 ASSESSMENT — ACTIVITIES OF DAILY LIVING (ADL)
ORAL_HYGIENE: INDEPENDENT
ORAL_HYGIENE: INDEPENDENT
DRESS: STREET CLOTHES
HYGIENE/GROOMING: PROMPTS;INDEPENDENT
LAUNDRY: WITH SUPERVISION
HYGIENE/GROOMING: INDEPENDENT
DRESS: STREET CLOTHES
LAUNDRY: WITH SUPERVISION

## 2019-10-21 ASSESSMENT — LIFESTYLE VARIABLES: TOBACCO_USE: 0

## 2019-10-21 NOTE — ANESTHESIA POSTPROCEDURE EVALUATION
Patient: Misty Parham    * No procedures listed *    Diagnosis:* No pre-op diagnosis entered *  Diagnosis Additional Information: No value filed.    Anesthesia Type:  General    Note:  Anesthesia Post Evaluation    Patient location during evaluation: PACU  Patient participation: Able to fully participate in evaluation  Level of consciousness: awake  Pain management: adequate  Airway patency: patent  Cardiovascular status: acceptable  Respiratory status: acceptable  Hydration status: acceptable  PONV: none     Anesthetic complications: None          Last vitals:  Vitals:    10/20/19 0700 10/20/19 1500 10/21/19 0547   BP: 121/77 110/80 120/80   Pulse: 72 85    Resp: 16 16 16   Temp: 36.7  C (98.1  F)  36.6  C (97.8  F)   SpO2: 96% 99% 99%         Electronically Signed By: OCTAVIO LARSEN MD  October 21, 2019  7:11 AM

## 2019-10-21 NOTE — PLAN OF CARE
"Patient completed her Series of ECT today. Patient states she \"feels better\" and is \"happy\" with the outcome. Patient attended  groups and was visible on the unit. She is pleasant and cooperative. Mood is depressed, sad. Affect is flat and blunt.   "

## 2019-10-21 NOTE — PROCEDURES
.[facility] ECT Procedure Note     Misty Parham [id number]   34 year old 1985     Patient Status: Inpatient     No Known Allergies    Weight:  227 lbs 3.2 oz          Diagnosis:     Major Depression      Indications for ECT:     Medications ineffective     Clinical Narrative:     ECT administered by thymatron machine, consent signed, side effects, risks, benefits reviewed.     Pause for the Cause:     Right patient Yes   Right procedure/laterality settings: Yes   Right diagnosis Yes      Intra-Procedure Documentation:     Date:  10/21/2019  Time:  7:42 AM    ECT #    Treatment number this series: 6   Total treatment number: 6     Type of ECT: Bilateral    ECT Medications:    Brevital: 100mg   Toradol: 30mg  Succinyl Choline: 100mg     ECT Strip Summary:   Energy Level: 100 percent  Motor Seizure Duration: 43 seconds  EEG Seizure Duration: 43 seconds    Complications: None     Plan: done

## 2019-10-21 NOTE — ANESTHESIA PREPROCEDURE EVALUATION
Anesthesia Pre-Procedure Evaluation    Patient: Misty Parham   MRN: [id number] : 1985          Preoperative Diagnosis: * No pre-op diagnosis entered *    * No procedures listed *    Past Medical History:   Diagnosis Date     Depressive disorder      Past Surgical History:   Procedure Laterality Date     CHOLECYSTECTOMY         Anesthesia Evaluation     . Pt has had prior anesthetic. Type: General    No history of anesthetic complications          ROS/MED HX    ENT/Pulmonary:      (-) tobacco use and sleep apnea   Neurologic:      (-) Delerium   Cardiovascular:     (+) ----. : . . . :. . Previous cardiac testing date:results:date: results:ECG reviewed date:19 results:NSR date: results:          METS/Exercise Tolerance:  >4 METS   Hematologic:         Musculoskeletal:         GI/Hepatic:        (-) GERD   Renal/Genitourinary:         Endo:     (+) Obesity (BMI 37), .      Psychiatric:     (+) psychiatric history (severe, requring ECT) depression      Infectious Disease:         Malignancy:         Other:    (+) No chance of pregnancy                           Physical Exam  Normal systems: cardiovascular, pulmonary and dental    Airway   Mallampati: III  TM distance: >3 FB  Neck ROM: full    Dental     Cardiovascular       Pulmonary             Lab Results   Component Value Date    WBC 7.6 10/08/2019    HGB 13.1 10/08/2019    HCT 40.7 10/08/2019     10/08/2019     10/08/2019    POTASSIUM 4.0 10/08/2019    CHLORIDE 109 10/08/2019    CO2 28 10/08/2019    BUN 14 10/08/2019    CR 1.00 10/08/2019    GLC 95 10/08/2019    CRISTINE 9.2 10/08/2019    ALBUMIN 3.7 2019    PROTTOTAL 7.8 2019    ALT 21 2019    AST 20 2019    ALKPHOS 76 2019    BILITOTAL 0.4 2019    TSH 1.26 2019    HCG Negative 10/07/2019       Preop Vitals  BP Readings from Last 3 Encounters:   10/21/19 120/80   19 107/68   19 134/86    Pulse Readings from Last 3 Encounters:   10/20/19 85  "  09/20/19 80   07/03/19 79      Resp Readings from Last 3 Encounters:   10/21/19 16   09/20/19 16   07/03/19 18    SpO2 Readings from Last 3 Encounters:   10/21/19 99%   09/20/19 96%   07/03/19 96%      Temp Readings from Last 1 Encounters:   10/21/19 36.6  C (97.8  F) (Oral)    Ht Readings from Last 1 Encounters:   10/07/19 1.651 m (5' 5\")      Wt Readings from Last 1 Encounters:   10/15/19 103.1 kg (227 lb 3.2 oz)    Estimated body mass index is 37.81 kg/m  as calculated from the following:    Height as of 10/7/19: 1.651 m (5' 5\").    Weight as of 10/15/19: 103.1 kg (227 lb 3.2 oz).       Anesthesia Plan      History & Physical Review  History and physical reviewed and following examination; no interval change.    ASA Status:  3 .    NPO Status:  > 8 hours    Plan for General with Intravenous induction.          Postoperative Care      Consents  Anesthetic plan, risks, benefits and alternatives discussed with:  Patient.  Use of blood products discussed: No .   .                   OCTAVIO LARSEN MD  "

## 2019-10-21 NOTE — PROGRESS NOTES
"[facility] Psychiatric Progress Note       Interim History     The patient's care was discussed with the treatment team and chart notes were reviewed. Over the weekend, the patient was minimally social, respectful to staff members and her peers, compliant with medications, and attended some group sessions per attending nursing and psych associate notes. Patient seen on 10/21/19 by Dr. Madrigal. Patient underwent her sixth and final ECT session this morning where there were no issues or complications. She denies experiencing common side effects from this procedure such as nausea, headache or significant confusion. She states, \"I'm doing a lot better\" and claims to be more hopeful and less depressed overall. Again, Dr. Madrigal notes he would like the patient to look into the book It Will Never Happen to Me, start attending regular group sessions, and wearing normal clothing. Patient will need to demonstrate that she can function. Patient acknowledges all of these things.      Hospital Course   This is a 33 year old female with previous psychiatric diagnoses that include major depression, generalized anxiety, and traits of borderline personality disorder. She obtains four previous inpatient mental health hospitalizations, her most recent being less than 1 month ago here at [facility]. Patient was presented to [facility] ED on 10/07/19 from [facility] for evaluation of suicidal ideation. Prior to admission, the patient had been on and off of her psychiatric medications. Her symptoms of depression worsened and she had a plan to overdose on Trazodone. Patient was deemed appropriate for inpatient level of care for safety and further stabilization. While meeting with Dr. Madrigal this morning, the patient appeared severely depressed in mood and flat in affect. She was informed that her outpatient psychiatrist believes that she would benefit most from a series of ECT while inpatient. Dr. Madrigal " "agreed with this, thus will have patient start a series of bilateral ECT tomorrow morning, 10/09/19. In addition to this, the patient was highly encouraged to attend group sessions and spend less time isolative to her room. On 10/16, the patients life stressors outside of the hospital setting were reviewed in further detail. Topics such as her job, ex-, living situation, and money issues were considered. Dr. Madrigal offered different resources for after this hospitalization, such as the patient attending regular Al-Anon meetings. Patient acknowledged this and [facility]      Medications     Current Facility-Administered Medications Ordered in Epic   Medication Dose Route Frequency Last Rate Last Dose     brexpiprazole (REXULTI) tablet 3 mg  3 mg Oral Daily   3 mg at 10/21/19 0752     hydrOXYzine (ATARAX) tablet 25 mg  25 mg Oral TID PRN         ibuprofen (ADVIL/MOTRIN) tablet 200 mg  200 mg Oral Q6H PRN   200 mg at 10/14/19 2042     ketorolac (TORADOL) injection 30 mg  30 mg Intravenous Q6H PRN   30 mg at 10/21/19 0637     lactated ringers infusion  500 mL Intravenous Continuous 25 mL/hr at 10/21/19 0615       lidocaine 1 % 0.1-1 mL  0.1-1 mL Other Q1H PRN   0.1 mL at 10/21/19 0615     lidocaine patch in PLACE   Transdermal Q8H         lidocaine patch in PLACE   Transdermal Q8H         mirtazapine (REMERON) tablet 45 mg  45 mg Oral At Bedtime   45 mg at 10/20/19 2111     sodium chloride (PF) 0.9% PF flush 3 mL  3 mL Intracatheter Q8H         traZODone (DESYREL) tablet 100-200 mg  100-200 mg Oral At Bedtime   200 mg at 10/20/19 2111     vortioxetine (TRINTELLIX/BRINTELLIX) tablet 20 mg  20 mg Oral Daily   20 mg at 10/21/19 0752     No current [facility]-ordered outpatient medications on file.         Allergies      No Known Allergies     Medical Review of Systems     BP (!) 137/90   Pulse 104   Temp 97.9  F (36.6  C) (Oral)   Resp 16   Ht 1.651 m (5' 5\")   Wt 103.1 kg (227 lb 3.2 oz)   " SpO2 94%   BMI 37.81 kg/m    Body mass index is 37.81 kg/m .  A 10-point review of systems was performed by Zhang Madrigal MD and is negative, no new findings.      Psychiatric Examination     Appearance Sitting in chair, dressed in hospital scrubs. Appears stated age.   Attitude Cooperative   Orientation Oriented to person, place, time   Eye Contact Fair    Speech Regular rate, rhythm, volume and tone   Language Normal   Psychomotor Behavior Normal   Mood Less depressed    Affect Calm and quiet   Thought Process Goal-Oriented, Intact   Associations Intact   Thought Content Patient is currently negative for suicidal ideation, negative for plan or intent, able to contract no self harm and identify barriers to suicide.  Negative for obsessions, compulsions or psychosis.     Fund of Knowledge Intact   Insight Poor   Judgement Poor   Attention Span & Concentration Intact    Recent & Remote Memory Intact   Gait Normal   Muscle Tone Intact        Labs     Labs reviewed.  No results found for this or any previous visit (from the past 24 hour(s)).     Impression   This is a 33 year old female with previous psychiatric diagnoses that include major depression, generalized anxiety, and traits of borderline personality disorder. Patient underwent her sixth and final ECT session this morning where there were no complications or issues. She denied experiencing common side effects such as nausea, headache, or significant confusion. Dr. Madrigal believes patient would benefit most from maintenance ECT after hospitalization stay. Aside from this, the patient asked more about her discharge date throughout interview. Dr. Madrigal would like the patient to start demonstrating that she can function. She should be attending more group sessions throughout the day, wearing normal clothing and looking into the book It Will Never Happen to Me. Patient in agreement with trying these things.      Diagnoses   1. Major depression,  recurrent, severe, without psychotic features.  2. Generalized Anxiety Disorder  3. Traits of Borderline Personality Disorder     Plan     1. Explained side effects, benefits, and complications of medications to the patient, Pt gave verbal consent.  2. Medication changes: None  3. Discussed treatment plan with patient and team.  4. Projected length of stay: 7+ days   5. Patient will undergo maintenance ECT after hospitalization     Attestation:   I, Alessandra Chambers, am serving as a scribe on 10/21/2019 to document services personally performed by Zhang Madrigal MD based on my observations and the provider's statements to me.    Patient ID:  Name: Misty Parham    MRN: [id number]  Admission: 10/7/2019   YOB: 1985

## 2019-10-21 NOTE — PLAN OF CARE
Patient presents with a sad affect and depressed mood. Slow and soft spoken, walking slowly. Appears untidy and disheveled. Not social with peers or staff. Responds with superficial answers.

## 2019-10-21 NOTE — PLAN OF CARE
BEHAVIORAL TEAM DISCUSSION    Participants: MD,OT,RN,PA,CM  Progress: Participation in groups increasing in small increments. Completed ECT series.  Anticipated length of stay: 4-5 days  Continued Stay Criteria/Rationale: Patient needs to demonstrate she can function by increased participation in milieu activities and self care.  Medical/Physical: NA  Precautions: Code1  Behavioral Orders   Procedures    Code 1 - Restrict to Unit    Routine Programming     As clinically indicated    Status 15     Every 15 minutes.     Plan: Encourage increased level of participation to demostrate ability to follow through after discharge.  Rationale for change in precautions or plan: NA

## 2019-10-21 NOTE — PROGRESS NOTES
Pt attended OT group, however, participated minimally despite direct prompts. Pt appeared flat and responded to questions in one to two word answers.

## 2019-10-22 PROCEDURE — 12400000 ZZH R&B MH

## 2019-10-22 PROCEDURE — 25000132 ZZH RX MED GY IP 250 OP 250 PS 637: Performed by: PSYCHIATRY & NEUROLOGY

## 2019-10-22 PROCEDURE — 90853 GROUP PSYCHOTHERAPY: CPT

## 2019-10-22 RX ADMIN — MIRTAZAPINE 45 MG: 45 TABLET, FILM COATED ORAL at 21:11

## 2019-10-22 RX ADMIN — BREXPIPRAZOLE 3 MG: 1 TABLET ORAL at 09:15

## 2019-10-22 RX ADMIN — TRAZODONE HYDROCHLORIDE 200 MG: 100 TABLET ORAL at 21:11

## 2019-10-22 RX ADMIN — VORTIOXETINE 20 MG: 20 TABLET, FILM COATED ORAL at 09:15

## 2019-10-22 ASSESSMENT — ACTIVITIES OF DAILY LIVING (ADL)
DRESS: STREET CLOTHES
LAUNDRY: WITH SUPERVISION
DRESS: STREET CLOTHES
HYGIENE/GROOMING: PROMPTS;INDEPENDENT
ORAL_HYGIENE: PROMPTS;INDEPENDENT
HYGIENE/GROOMING: PROMPTS;INDEPENDENT
ORAL_HYGIENE: PROMPTS;INDEPENDENT

## 2019-10-22 ASSESSMENT — MIFFLIN-ST. JEOR: SCORE: 1738.69

## 2019-10-22 NOTE — PLAN OF CARE
Pt has been present throughout the evening. Has mainly been in the lounge watching TV with peers. Keeps to herself and stares off at the ground. Presents a flat, sad affect; mood is depressed. Neglected grooming; untidy. Med compliant. Respectful to peers and staff.

## 2019-10-22 NOTE — PLAN OF CARE
Problem: Depressive Symptoms  Goal: Depressive Symptoms  Description  1. Patient will take medication as ordered  2. Patient insight will improve and work through grief issues  3. Patient will attend therapies and gain new insights   Outcome: No Change  Flowsheets (Taken 10/22/2019 1322)  Depressive Symptoms Assessed: all  Depressive Symptoms Present: affect; mood; insight  Note:   Pt presents with blunted affect and a depressed mood. Her behavior continues to be appropriate, but isolative and withdrawn. Pt's thought processes are fair, and insight and judgement are impaired. Pt spent most of the day watching TV in the [place] and attending groups, but with no significant participation. Pt continues to neglect her hygiene. She denies current Si and is med-compliant.

## 2019-10-22 NOTE — PROGRESS NOTES
[facility] Psychiatric Progress Note       Interim History     The patient's care was discussed with the treatment team and chart notes were reviewed. Patient seen on 10/22/19 by Dr. Madrigal. Patient notes she is doing well this afternoon. She denies anything new occurring, denies medication complications and denies attaining any pressing issues. Dr. Madrigal will not make any medication adjustments today. Will continue the patient current regimen. Aside from this, the patient notes she has not yet looked up the book It Will Never Happen to Me even after Dr. Madrigal has asked her to do so the past 3 days. Dr. Madrigal highly encourages the patient to do this, patient acknowledges.      Hospital Course   This is a 33 year old female with previous psychiatric diagnoses that include major depression, generalized anxiety, and traits of borderline personality disorder. She obtains four previous inpatient mental health hospitalizations, her most recent being less than 1 month ago here at [facility]. Patient was presented to [facility] ED on 10/07/19 from [facility] for evaluation of suicidal ideation. Prior to admission, the patient had been on and off of her psychiatric medications. Her symptoms of depression worsened and she had a plan to overdose on Trazodone. Patient was deemed appropriate for inpatient level of care for safety and further stabilization. While meeting with Dr. Madrigal this morning, the patient appeared severely depressed in mood and flat in affect. She was informed that her outpatient psychiatrist believes that she would benefit most from a series of ECT while inpatient. Dr. Madrigal agreed with this, thus will have patient start a series of bilateral ECT tomorrow morning, 10/09/19. In addition to this, the patient was highly encouraged to attend group sessions and spend less time isolative to her room. On 10/16, the patients life stressors outside of the hospital  "setting were reviewed in further detail. Topics such as her job, ex-, living situation, and money issues were considered. Dr. Madrigal offered different resources for after this hospitalization, such as the patient attending regular Al-Anon meetings. Patient acknowledged this and [facility]      Medications     Current Facility-Administered Medications Ordered in Epic   Medication Dose Route Frequency Last Rate Last Dose     brexpiprazole (REXULTI) tablet 3 mg  3 mg Oral Daily   3 mg at 10/21/19 0752     hydrOXYzine (ATARAX) tablet 25 mg  25 mg Oral TID PRN         ibuprofen (ADVIL/MOTRIN) tablet 200 mg  200 mg Oral Q6H PRN   200 mg at 10/14/19 2042     mirtazapine (REMERON) tablet 45 mg  45 mg Oral At Bedtime   45 mg at 10/21/19 2109     traZODone (DESYREL) tablet 100-200 mg  100-200 mg Oral At Bedtime   200 mg at 10/21/19 2109     vortioxetine (TRINTELLIX/BRINTELLIX) tablet 20 mg  20 mg Oral Daily   20 mg at 10/21/19 0752     No current [facility]-ordered outpatient medications on file.         Allergies      No Known Allergies     Medical Review of Systems     BP 91/69   Pulse 98   Temp 98  F (36.7  C) (Oral)   Resp 16   Ht 1.651 m (5' 5\")   Wt 103.1 kg (227 lb 3.2 oz)   SpO2 95%   BMI 37.81 kg/m    Body mass index is 37.81 kg/m .  A 10-point review of systems was performed by Zhang Madrigal MD and is negative, no new findings.      Psychiatric Examination     Appearance Sitting in chair, dressed in hospital scrubs. Appears stated age.   Attitude Cooperative   Orientation Oriented to person, place, time   Eye Contact Fair    Speech Regular rate, rhythm, volume and tone   Language Normal   Psychomotor Behavior Normal   Mood Less depressed    Affect Calm and quiet   Thought Process Goal-Oriented, Intact   Associations Intact   Thought Content Patient is currently negative for suicidal ideation, negative for plan or intent, able to contract no self harm and identify barriers to " suicide.  Negative for obsessions, compulsions or psychosis.     Fund of Knowledge Intact   Insight Poor   Judgement Poor   Attention Span & Concentration Intact    Recent & Remote Memory Intact   Gait Normal   Muscle Tone Intact        Labs     Labs reviewed.  No results found for this or any previous visit (from the past 24 hour(s)).     Impression   This is a 33 year old female with previous psychiatric diagnoses that include major depression, generalized anxiety, and traits of borderline personality disorder. While meeting with Dr. Madrigal this afternoon, the patient again was quiet, withdrawn and spoke very little. She denied obtaining any issues or complications in regards of her current medications or the overall care provided by [facility]. Dr. Madrigal did not make any medication adjustments this afternoon, rather would like the patient to continue on her current regimen. The patient was again highly encouraged by Dr. Madrigal to look into the book It Will Never Happen to Me. The patient has been prompted to do so the past 3 days, however has failed to follow through. Again encouraged the patient to attend group sessions and become more active and interactive throughout SDU.      Diagnoses   1. Major depression, recurrent, severe, without psychotic features.  2. Generalized Anxiety Disorder  3. Traits of Borderline Personality Disorder     Plan     1. Explained side effects, benefits, and complications of medications to the patient, Pt gave verbal consent.  2. Medication changes: None  3. Discussed treatment plan with patient and team.  4. Projected length of stay: 7+ days   5. Patient will undergo maintenance ECT after hospitalization     Attestation:   Alessandra LEBLANC, am serving as a scribe on 10/22/2019 to document services personally performed by Zhang Madrigal MD based on my observations and the provider's statements to me.    Patient ID:  Name: Misty Parham    MRN:  [id number]  Admission: 10/7/2019   YOB: 1985

## 2019-10-23 VITALS
RESPIRATION RATE: 16 BRPM | TEMPERATURE: 97.5 F | DIASTOLIC BLOOD PRESSURE: 84 MMHG | HEART RATE: 85 BPM | BODY MASS INDEX: 38.49 KG/M2 | SYSTOLIC BLOOD PRESSURE: 121 MMHG | OXYGEN SATURATION: 98 % | WEIGHT: 231 LBS | HEIGHT: 65 IN

## 2019-10-23 PROCEDURE — 90853 GROUP PSYCHOTHERAPY: CPT

## 2019-10-23 PROCEDURE — 25000132 ZZH RX MED GY IP 250 OP 250 PS 637: Performed by: PSYCHIATRY & NEUROLOGY

## 2019-10-23 RX ADMIN — BREXPIPRAZOLE 3 MG: 1 TABLET ORAL at 08:46

## 2019-10-23 RX ADMIN — VORTIOXETINE 20 MG: 20 TABLET, FILM COATED ORAL at 08:45

## 2019-10-23 NOTE — PROGRESS NOTES
"Pt transitioned to OT group with MIN encouragementand engaged in therapeutic activity addressing functional cognition and interpersonal skills with task set up and extended time. Pt participated in therapeutic group activity and discussion addressing improved motivation and self concept by use of affirmations. With task set upm MOD VCs for initiation, and MOD A to problem solve, pt created visual representation of positive quote \"The only limits in life are the ones you make\" and ID'd when it would be useful to refer to same. Pt will continue to benefit from OT intervention to address implementation of positive functional coping skills, role performance, and community reintegration.     "

## 2019-10-23 NOTE — PROGRESS NOTES
Discharge teaching done. Pt denied SI. Pt is flat in affect, mood depressed. Pt verbalized understanding of medication and out pt f/u TX plan. Medication was not ordered, Pt is to  samples of Rexulti at Dr. Madrigal's office today. Pt called her  and he is on the way tot [facility].

## 2019-10-23 NOTE — DISCHARGE SUMMARY
[facility] Psychiatric Discharge Summary      DATE OF ADMISSION: 10/7/2019     DATE OF DISCHARGE: 10/23/19    PRIMARY CARE PHYSICIAN: Monse Chen    IDENTIFICATION: For history, see dictation by Dr. Madrigal on 10/08/19. For physical summary, see dictation by Kimber Prince PA-C on 9/13/19.     HOSPITAL COURSE:   This is a 33 year old female with previous psychiatric diagnoses that include major depression, generalized anxiety, and traits of borderline personality disorder. She obtains four previous inpatient mental health hospitalizations, her most recent being less than 1 month ago here at [facility]. Patient was presented to [facility] ED on 10/07/19 from [facility] for evaluation of suicidal ideation. Prior to admission, the patient had been on and off of her psychiatric medications. Her symptoms of depression worsened and she had a plan to overdose on Trazodone. Patient was deemed appropriate for inpatient level of care for safety and further stabilization. While initially meeting with Dr. Madrigal, the patient appeared severely depressed in mood and flat in affect. She was informed that her outpatient psychiatrist, Drew Juarez, believed that she would benefit most from a series of ECT while inpatient. Both patient and Dr. Madrigal agreed with this idea, thus patient started her first session of bilateral ECT on 10/09/19. In addition to this, the patient was highly encouraged to attend group sessions and spend less time isolative to her room while on SDU. Patient completed her series of ECT on 10/21. Patient denied experiencing any pressing issues, complications or common side effects from the procedure. Aside from this, the patients life stressors outside of the hospital setting were reviewed in further detail on further into hospitalization. Topics such as her job, ex-, living situation, and money issues were considered. Dr. Madrigal offered different resources  "for after this hospitalization, such as the patient attending regular Al-Anon meetings and reading the book It Will Never Happen to Me. Patient acknowledged this and will look into Al-Anon meetings and this book after this hospitalization.     DISCHARGE MENTAL STATUS EXAMINATION:  Appearance Sitting in chair, dressed in hospital scrubs. Appears stated age.   Attitude Cooperative   Orientation Oriented to person, place, time   Eye Contact Fair    Speech Regular rate, rhythm, volume and tone   Language Normal   Psychomotor Behavior Normal   Mood Less depressed    Affect Calm and quiet   Thought Process Goal-Oriented, Intact   Associations Intact   Thought Content Patient is currently negative for suicidal ideation, negative for plan or intent, able to contract no self harm and identify barriers to suicide.  Negative for obsessions, compulsions or psychosis.     Fund of Knowledge Intact   Insight Poor   Judgement Poor   Attention Span & Concentration Intact    Recent & Remote Memory Intact   Gait Normal   Muscle Tone Intact        LABORATORY DATA:    Refer to hospitalist admission dictation.  No results found for this or any previous visit (from the past 24 hour(s)).     BP 97/67   Pulse 82   Temp 98  F (36.7  C) (Oral)   Resp 15   Ht 1.651 m (5' 5\")   Wt 104.8 kg (231 lb)   SpO2 96%   BMI 38.44 kg/m       DISCHARGE MEDICATIONS:      Review of your medicines      UNREVIEWED medicines. Ask your doctor about these medicines      Dose / Directions   brexpiprazole 2 MG tablet  Commonly known as:  REXULTI  Used for:  Depression with suicidal ideation      Dose:  2 mg  Take 1 tablet (2 mg) by mouth daily  Refills:  0     diphenhydrAMINE 25 MG tablet  Commonly known as:  BENADRYL      Take by mouth as needed for itching or allergies  Refills:  0     hydrOXYzine 25 MG tablet  Commonly known as:  ATARAX      Dose:  25 mg  Take 25 mg by mouth 3 times daily as needed for anxiety  Refills:  0     ibuprofen 200 MG " tablet  Commonly known as:  ADVIL/MOTRIN      Take by mouth as needed for mild pain  Refills:  0     mirtazapine 30 MG tablet  Commonly known as:  REMERON      Dose:  45 mg  Take 45 mg by mouth At Bedtime  Refills:  0     traZODone 100 MG tablet  Commonly known as:  DESYREL      Dose:  100-200 mg  Take 100-200 mg by mouth At Bedtime  Refills:  0     vortioxetine 20 MG tablet  Commonly known as:  TRINTELLIX/BRINTELLIX      Dose:  20 mg  Take 20 mg by mouth daily  Refills:  0            DISCHARGE DIAGNOSES:  1. Major depression, recurrent, severe, without psychotic features.  2. Generalized Anxiety Disorder  3. Traits of Borderline Personality Disorder    DISCHARGE PLAN:   1. Discharge medications: Rexulti 3mg daily, Remeron 45mg at bedtime, Trazodone 100-200mg at bedtime and Trintellix 20 daily   2. Patient will receive samples of Rexulti at [facility]   3. Patient will follow up with Drew Juarez NP at [facility] for psychiatric care. Appointment scheduled on Monday, 10/25/19 at 1:40pm   4. Patient will return to the [facility] in [place], [place]. Return on Friday, 10/25/19  5. Patient encouraged to attend regular Al-Anon meetings   6. Patient encouraged to read the book It Will Never Happen to Me     Attestation:   Alessandra LEBLANC, am serving as a scribe on 10/23/2019 to document services personally performed by Zhang Madrigal MD based on my observations and the provider's statements to me.    Patient ID:    Name: Msity Parham MRN: [id number]  Admission: 10/7/2019  YOB: 1985

## 2019-10-23 NOTE — PLAN OF CARE
Pt spent more time in the lounge today until after supper. Pt did not attend evening groups; rested in bed. Pt denies hearing voices, denies suicidal thoughts or thoughts of self harm. Untidy. Soft-spoken, quiet voice. Med compliant.

## 2019-10-23 NOTE — PROGRESS NOTES
Pt's  arrived to the unit, Belongings were returned to pt. Pt was reminded to  medication at Dr. marquez's office. Pt discharged to home.

## 2019-10-23 NOTE — DISCHARGE INSTRUCTIONS
Behavioral Discharge Planning and Instructions    Summary: Admitted to hospital with suicidal ideation.    Main Diagnosis: Major depression; Generalized Anxiety Disorder; Traits of Borderline Personality Disorder     Major Treatments, Procedures and Findings: Psychiatric assessment and medication adjustment; Series of 6 electroconvulsive therapy treatments. No driving for 1 week after last treatment.    Symptoms to Report: mood getting worse or thoughts of suicide,; isolating and missing work.    Lifestyle Adjustment: Develop and follow safety plan. Follow up with herapy and medication management. Most important is social interaction and building a support system    Psychiatry Follow-up:         [facility]  [address], [address]                                                  Go to Dr. Madrigal's office today to  samples of Rexulti.  [place] Marquez  [id number]  Phone:  [phone]  Fax:  [phone]  Appointment with Drew Juarez on Monday 10/28/19 @ 1:40 pm.    Return to [facility] at [facility]: return on Friday 10/25/19 @ 9 am.  [address] [id number] [phone] fax: [phone]    Kavon from [facility] is available to provide telephone support and additional resources. Phone # is 1 [phone]. When she calls you to check in- answer the phone.    Resources:   [facility] Crisis [facility]-   Crisis Intervention: 229.309.8675 or 539-530-5764 (TTY: 678.472.4592).  Call anytime for help.  National [place] on Mental Illness (www.[place].davian.org): 758.683.7078 or 159-417-1465.  Alcoholics Anonymous (www.alcoholics-anonymous.org): Check your phone book for your local chapter.  National Suicide Prevention Line (www.mentalhealthmn.org): 017-325-KJES (3524)    General Medication Instructions:   See your medication sheet(s) for instructions.   Take all medicines as directed.  Make no changes unless your doctor suggests them.    Go to all your doctor visits.  Be sure to have all your required lab tests. This way, your medicines can be refilled on time.  Do not use any drugs not prescribed by your doctor.  Avoid alcohol.

## 2019-10-25 NOTE — ADDENDUM NOTE
Addendum  created 10/25/19 0814 by Shanika Torres    Attestation recorded in Intraprocedure, Clinical Note Signed, Intraprocedure Attestations filed

## 2019-10-25 NOTE — ANESTHESIA POSTPROCEDURE EVALUATION
Patient: Misty Parham    * No procedures listed *    Diagnosis:* No pre-op diagnosis entered *  Diagnosis Additional Information: No value filed.    Anesthesia Type:  General, RSI    Note:  Anesthesia Post Evaluation    Patient location during evaluation: PACU  Patient participation: Able to fully participate in evaluation  Level of consciousness: awake  Pain management: adequate  Airway patency: patent  Cardiovascular status: acceptable  Respiratory status: acceptable  Hydration status: acceptable  PONV: none     Anesthetic complications: None          Last vitals:  Vitals:    10/22/19 0730 10/22/19 1600 10/23/19 0700   BP: 110/73 97/67 121/84   Pulse: 75 82 85   Resp: 16 15 16   Temp: 36.4  C (97.6  F) 36.7  C (98  F) 36.4  C (97.5  F)   SpO2: 94% 96% 98%         Electronically Signed By: Shanika Torres  October 25, 2019  8:13 AM

## 2019-10-28 NOTE — ANESTHESIA POSTPROCEDURE EVALUATION
Patient: Misty Parham    ECT    Diagnosis: Refractory Major Depressive Disorder  Diagnosis Additional Information: No value filed.    Anesthesia Type:  General    Note:  Anesthesia Post Evaluation    Last vitals:  Vitals:    10/22/19 0730 10/22/19 1600 10/23/19 0700   BP: 110/73 97/67 121/84   Pulse: 75 82 85   Resp: 16 15 16   Temp: 36.4  C (97.6  F) 36.7  C (98  F) 36.4  C (97.5  F)   SpO2: 94% 96% 98%         Electronically Signed By: Tucker Cornelius MD  October 28, 2019  10:56 AM

## 2019-11-04 NOTE — ANESTHESIA POSTPROCEDURE EVALUATION
Patient: Misty Parham    * No procedures listed *    Diagnosis:Refractory Major Depressive Disorder  Diagnosis Additional Information: No value filed.    Anesthesia Type:  General    Note:  Anesthesia Post Evaluation    Patient location during evaluation: PACU  Patient participation: Able to fully participate in evaluation  Level of consciousness: awake and alert  Pain management: adequate  Airway patency: patent  Cardiovascular status: acceptable and stable  Respiratory status: acceptable, spontaneous ventilation, unassisted and nonlabored ventilation  Hydration status: acceptable  PONV: none             Last vitals:  Vitals:    10/22/19 0730 10/22/19 1600 10/23/19 0700   BP: 110/73 97/67 121/84   Pulse: 75 82 85   Resp: 16 15 16   Temp: 36.4  C (97.6  F) 36.7  C (98  F) 36.4  C (97.5  F)   SpO2: 94% 96% 98%         Electronically Signed By: Nilo Thomas MD  November 4, 2019  3:15 PM

## 2019-11-25 ENCOUNTER — HOSPITAL ENCOUNTER (INPATIENT)
Facility: CLINIC | Age: 34
LOS: 52 days | Discharge: HOME | End: 2020-01-16
Attending: EMERGENCY MEDICINE | Admitting: PSYCHIATRY & NEUROLOGY
Payer: COMMERCIAL

## 2019-11-25 DIAGNOSIS — F32.A DEPRESSION, UNSPECIFIED DEPRESSION TYPE: Primary | ICD-10-CM

## 2019-11-25 DIAGNOSIS — F33.2 SEVERE EPISODE OF RECURRENT MAJOR DEPRESSIVE DISORDER, WITHOUT PSYCHOTIC FEATURES (H): ICD-10-CM

## 2019-11-25 DIAGNOSIS — F32.A DEPRESSION WITH SUICIDAL IDEATION: ICD-10-CM

## 2019-11-25 DIAGNOSIS — R45.851 DEPRESSION WITH SUICIDAL IDEATION: ICD-10-CM

## 2019-11-25 PROCEDURE — 12400000 ZZH R&B MH

## 2019-11-25 PROCEDURE — 25000132 ZZH RX MED GY IP 250 OP 250 PS 637: Performed by: PSYCHIATRY & NEUROLOGY

## 2019-11-25 PROCEDURE — 99285 EMERGENCY DEPT VISIT HI MDM: CPT | Mod: 25

## 2019-11-25 PROCEDURE — 90791 PSYCH DIAGNOSTIC EVALUATION: CPT

## 2019-11-25 RX ORDER — MIRTAZAPINE 45 MG/1
45 TABLET, FILM COATED ORAL AT BEDTIME
Status: DISCONTINUED | OUTPATIENT
Start: 2019-11-25 | End: 2020-01-16 | Stop reason: HOSPADM

## 2019-11-25 RX ORDER — HYDROXYZINE HYDROCHLORIDE 25 MG/1
25 TABLET, FILM COATED ORAL 3 TIMES DAILY PRN
Status: DISCONTINUED | OUTPATIENT
Start: 2019-11-25 | End: 2020-01-16 | Stop reason: HOSPADM

## 2019-11-25 RX ADMIN — MIRTAZAPINE 45 MG: 45 TABLET, FILM COATED ORAL at 21:59

## 2019-11-25 ASSESSMENT — ACTIVITIES OF DAILY LIVING (ADL)
DRESS: 0-->INDEPENDENT
COGNITION: 0 - NO COGNITION ISSUES REPORTED
SWALLOWING: 0-->SWALLOWS FOODS/LIQUIDS WITHOUT DIFFICULTY
AMBULATION: 0-->INDEPENDENT
BATHING: 0-->INDEPENDENT
RETIRED_COMMUNICATION: 0-->UNDERSTANDS/COMMUNICATES WITHOUT DIFFICULTY
TRANSFERRING: 0-->INDEPENDENT
FALL_HISTORY_WITHIN_LAST_SIX_MONTHS: NO
RETIRED_EATING: 0-->INDEPENDENT
TOILETING: 0-->INDEPENDENT

## 2019-11-25 ASSESSMENT — ENCOUNTER SYMPTOMS
FEVER: 0
SHORTNESS OF BREATH: 0
VOMITING: 0
NAUSEA: 0
ABDOMINAL PAIN: 0

## 2019-11-25 NOTE — ED NOTES
Bed: ED18  Expected date:   Expected time:   Means of arrival:   Comments:  [facility] - 34 F depression eta 9108

## 2019-11-25 NOTE — PHARMACY-ADMISSION MEDICATION HISTORY
Pharmacy Medication History  Admission medication history interview status for the 11/25/2019  admission is complete. See EPIC admission navigator for prior to admission medications     Medication history sources: Patient  Medication history source reliability: Good  Adherence assessment: Moderate    Significant changes made to the medication list:  Deleted trazodone reports not taking      Additional medication history information:   none    Medication reconciliation completed by provider prior to medication history? No    Time spent in this activity: 15 minutes      Prior to Admission medications    Medication Sig Last Dose Taking? Auth Provider   brexpiprazole (REXULTI) 3 MG tablet Take 1 tablet (3 mg) by mouth daily 11/22/2019  Zhang Madrigal MD   hydrOXYzine (ATARAX) 25 MG tablet Take 25 mg by mouth 3 times daily as needed for anxiety  at prn  Unknown, Entered By History   mirtazapine (REMERON) 30 MG tablet Take 45 mg by mouth At Bedtime  11/22  Unknown, Entered By History   vortioxetine (TRINTELLIX/BRINTELLIX) 20 MG tablet Take 20 mg by mouth daily 11/22/2019  Unknown, Entered By History     Keila MiramontesD  Inpatient Clinical Pharmacist  [phone]

## 2019-11-25 NOTE — ED TRIAGE NOTES
"PT SENT FROM CLINIC on 72 hr hold. Pt sts she told psychiatrist that \"some people cannot be saved\" and has not been taking her meds since Friday.  "

## 2019-11-25 NOTE — ED PROVIDER NOTES
"History     Chief Complaint:  Suicidal     HPI  Misty Parham is a 34 year old female with history of anxiety and depression who presents to the emergency department today with suicidal ideation. The patient states that she was at an appointment with her psychiatrist today who thought she was unstable and sent her here with suicidal ideation. She states that she stopped taking her medications 3 days ago because she feels \"hopeless\". She states she was here last month with a similar presentation. She denies homocidality, fever, nausea, vomiting, abdominal pain, chest pain, shortness of breath, or self harm.    Allergies:  The patient has no known drug allergies.     Medications:    Abilify  Rexulti  Atarax  Remeron  Desyrel  Trintellix     Past Medical History:    Depressive disorder  Suicidal ideation  Anxiety     Past Surgical History:    Cholecystectomy     Family History:    No past pertinent family history.    Social History:  The patient was accompanied to the ED alone.  Smoking Status: Never  Smokeless Tobacco: Never  Alcohol Use: No   Drug Use: No   PCP: Monse Chen   Marital Status:  Legally      Review of Systems   Constitutional: Negative for fever.   Respiratory: Negative for shortness of breath.    Cardiovascular: Negative for chest pain.   Gastrointestinal: Negative for abdominal pain, nausea and vomiting.   Psychiatric/Behavioral: Positive for suicidal ideas. Negative for self-injury.   10 point review of systems performed and is negative except as above and in HPI.    Physical Exam     Patient Vitals for the past 24 hrs:   BP Temp Temp src Pulse Heart Rate Resp SpO2   11/25/19 1503 116/73 98  F (36.7  C) Oral 77 77 16 98 %        Physical Exam   General: Resting on the gurney, appears comfortable.  Well groomed  Head:  The scalp, face, and head appear normal  Mouth/Throat: Mucus membranes are moist   CV:  Regular rate    Normal S1 and S2  No pathological murmur   Resp:  Breath sounds " clear and equal bilaterally    Non-labored, no retractions or accessory muscle use    No coarseness    No wheezing   GI:  Abdomen is soft, no rigidity    No tenderness to palpation  MS:  No evidence of self injury, no lacerations.  No evidence of trauma.  Skin:   No lacerations  Neuro:  Speech is normal.     No apparent focal deficit.      Uses all extremities equally.  Psych:  Awake. Alert.  Flat affect, Poor eye contact, congruent with mood.      Denies hallucinations, does not appear to be responding to internal stimuli. Poor   insight. Poor judgment.     Emergency Department Course     Emergency Department Course:  Nursing notes and vitals reviewed. (2:49 PM) I performed an exam of the patient as documented above.     1515 I consulted with DEC regarding the patient's history and presentation here in the emergency department. They are in agreement for admission of the patient.    Findings and plan explained to the Patient who consents to admission. Discussed the patient with DEC, who will admit the patient to a inpatient psych bed for further monitoring, evaluation, and treatment.    Impression & Plan       Medical Decision Making:  Misty Parham is a 34 year old female who presents for evaluation of depression and suicidal ideation.  They have a history of previous psychiatric illness and at this point appear decompensated psychiatrically.  A 72 hour hold was placed and security watch initiated given her suicidal ideation. The patient has had increasing depression and hopelessness. They are currently a risk to themselves. Although the patient has had increased suicidal thoughts, they have not had any actions of self harms.  They no signs at this point of suicide attempt or ingestion. Plan will be admission to inpatient psychiatric care. The patient was seen by DEC who agrees with this assessment.     Diagnosis:    ICD-10-CM    1. Depression, unspecified depression type F32.9       Disposition:  Admitted by  DEC.     Scribe Disclosure:  I, Ramírez Nerissa, am serving as a scribe at 2:49 PM on 11/25/2019 to document services personally performed by Sakina Melton MD based on my observations and the provider's statements to me.      11/25/2019    EMERGENCY DEPARTMENT       Sakina Melton MD  11/25/19 3536

## 2019-11-26 LAB
AMPHETAMINES UR QL SCN: NEGATIVE
ANION GAP SERPL CALCULATED.3IONS-SCNC: 4 MMOL/L (ref 3–14)
BARBITURATES UR QL: NEGATIVE
BASOPHILS # BLD AUTO: 0 10E9/L (ref 0–0.2)
BASOPHILS NFR BLD AUTO: 0.5 %
BENZODIAZ UR QL: NEGATIVE
BUN SERPL-MCNC: 13 MG/DL (ref 7–30)
CALCIUM SERPL-MCNC: 8.8 MG/DL (ref 8.5–10.1)
CANNABINOIDS UR QL SCN: NEGATIVE
CHLORIDE SERPL-SCNC: 111 MMOL/L (ref 94–109)
CO2 SERPL-SCNC: 28 MMOL/L (ref 20–32)
COCAINE UR QL: NEGATIVE
CREAT SERPL-MCNC: 0.84 MG/DL (ref 0.52–1.04)
DIFFERENTIAL METHOD BLD: NORMAL
EOSINOPHIL # BLD AUTO: 0.1 10E9/L (ref 0–0.7)
EOSINOPHIL NFR BLD AUTO: 1.7 %
ERYTHROCYTE [DISTWIDTH] IN BLOOD BY AUTOMATED COUNT: 14.8 % (ref 10–15)
GFR SERPL CREATININE-BSD FRML MDRD: 89 ML/MIN/{1.73_M2}
GLUCOSE SERPL-MCNC: 90 MG/DL (ref 70–99)
HCG UR QL: NEGATIVE
HCT VFR BLD AUTO: 39.9 % (ref 35–47)
HGB BLD-MCNC: 13 G/DL (ref 11.7–15.7)
IMM GRANULOCYTES # BLD: 0 10E9/L (ref 0–0.4)
IMM GRANULOCYTES NFR BLD: 0.2 %
LYMPHOCYTES # BLD AUTO: 1.9 10E9/L (ref 0.8–5.3)
LYMPHOCYTES NFR BLD AUTO: 28.4 %
MCH RBC QN AUTO: 29.4 PG (ref 26.5–33)
MCHC RBC AUTO-ENTMCNC: 32.6 G/DL (ref 31.5–36.5)
MCV RBC AUTO: 90 FL (ref 78–100)
MONOCYTES # BLD AUTO: 0.6 10E9/L (ref 0–1.3)
MONOCYTES NFR BLD AUTO: 8.9 %
NEUTROPHILS # BLD AUTO: 4 10E9/L (ref 1.6–8.3)
NEUTROPHILS NFR BLD AUTO: 60.3 %
NRBC # BLD AUTO: 0 10*3/UL
NRBC BLD AUTO-RTO: 0 /100
OPIATES UR QL SCN: NEGATIVE
PCP UR QL SCN: NEGATIVE
PLATELET # BLD AUTO: 298 10E9/L (ref 150–450)
POTASSIUM SERPL-SCNC: 3.8 MMOL/L (ref 3.4–5.3)
RBC # BLD AUTO: 4.42 10E12/L (ref 3.8–5.2)
SODIUM SERPL-SCNC: 143 MMOL/L (ref 133–144)
TSH SERPL DL<=0.005 MIU/L-ACNC: 1.61 MU/L (ref 0.4–4)
WBC # BLD AUTO: 6.6 10E9/L (ref 4–11)

## 2019-11-26 PROCEDURE — 12400000 ZZH R&B MH

## 2019-11-26 PROCEDURE — 99231 SBSQ HOSP IP/OBS SF/LOW 25: CPT | Performed by: NURSE PRACTITIONER

## 2019-11-26 PROCEDURE — 25000132 ZZH RX MED GY IP 250 OP 250 PS 637: Performed by: PSYCHIATRY & NEUROLOGY

## 2019-11-26 PROCEDURE — 80307 DRUG TEST PRSMV CHEM ANLYZR: CPT | Performed by: NURSE PRACTITIONER

## 2019-11-26 PROCEDURE — 80048 BASIC METABOLIC PNL TOTAL CA: CPT | Performed by: PSYCHIATRY & NEUROLOGY

## 2019-11-26 PROCEDURE — 99207 ZZC CONSULT E&M CHANGED TO SUBSEQUENT LEVEL: CPT | Performed by: NURSE PRACTITIONER

## 2019-11-26 PROCEDURE — 36415 COLL VENOUS BLD VENIPUNCTURE: CPT | Performed by: PSYCHIATRY & NEUROLOGY

## 2019-11-26 PROCEDURE — 81025 URINE PREGNANCY TEST: CPT | Performed by: NURSE PRACTITIONER

## 2019-11-26 PROCEDURE — 85025 COMPLETE CBC W/AUTO DIFF WBC: CPT | Performed by: PSYCHIATRY & NEUROLOGY

## 2019-11-26 PROCEDURE — 36415 COLL VENOUS BLD VENIPUNCTURE: CPT | Performed by: NURSE PRACTITIONER

## 2019-11-26 PROCEDURE — 84443 ASSAY THYROID STIM HORMONE: CPT | Performed by: NURSE PRACTITIONER

## 2019-11-26 RX ADMIN — MIRTAZAPINE 45 MG: 45 TABLET, FILM COATED ORAL at 20:05

## 2019-11-26 RX ADMIN — VORTIOXETINE 20 MG: 20 TABLET, FILM COATED ORAL at 09:43

## 2019-11-26 RX ADMIN — BREXPIPRAZOLE 3 MG: 2 TABLET ORAL at 09:43

## 2019-11-26 ASSESSMENT — ACTIVITIES OF DAILY LIVING (ADL)
LAUNDRY: WITH SUPERVISION
HYGIENE/GROOMING: INDEPENDENT
ORAL_HYGIENE: INDEPENDENT
DRESS: INDEPENDENT
DRESS: INDEPENDENT
HYGIENE/GROOMING: INDEPENDENT
LAUNDRY: WITH SUPERVISION
ORAL_HYGIENE: INDEPENDENT

## 2019-11-26 NOTE — PLAN OF CARE
BEHAVIORAL TEAM DISCUSSION    Participants: dr marquez, RN's, coty medrano [facility]  Progress: none yet; pt just admitted.   Depression, anxiety, and had suicidal ideation  Anticipated length of stay: unknown  Continued Stay Criteria/Rationale: new admit  Medical/Physical: none at this time  Precautions:   Behavioral Orders   Procedures    Code 1 - Restrict to Unit    Discontinue 1:1 attendant for suicide risk     Order Specific Question:   I have performed an in person assessment of the patient     Answer:   Based on this assessment the patient no longer requires a one on one attendant at this point in time.    Routine Programming     As clinically indicated    Status 15     Every 15 minutes.     Plan: meds per dr marquez  -Assist with follow up appointment with psychiatrist.  -Petition if needed  -Determine where pt will live upon discharge (back with mother in [place]?)      Rationale for change in precautions or plan: no change at this time
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
## 2019-11-26 NOTE — PROGRESS NOTES
11/26/19 1300   General Information   Has Not Attended OT as of: 11/26/19     Pt has not attended OT since admit.  Will continue to encourage participation and completion of  self-assessment as able. OT staff will explain the purpose of being involved with treatment plan and provide options to meet current needs and goals.

## 2019-11-26 NOTE — ED NOTES
Report has been given to station 77 . Pt is eating right now and once done she will be going upstairs.

## 2019-11-26 NOTE — PLAN OF CARE
Admit form [facility] ER and is on a 72 Hour hold. Affect seems depressed,sad and slow to respond to questions. Was at  a counseling appointment today and was not able to contract for safety. Talks about not being able to function at home,sleeps a lot and  Eating.Plan to over dose on medications. Previously admitted to [facility] and had ECT treatments. Not complaint with medications. Will contract for safety but continue to monitor closely.   Nursing assessment complete including patient and medication profiles. Risk assessments completed addressing suicide,fall,skin,nutrition and safety issues. Care plan initiated. Assessments reviewed with physician and admit orders received. Video monitoring in progress, Patient Informed.   Welcome packet reviewed with patient. Information reviewed includes getting emergency help, preventing infections, understanding your care, using medication safely, reducing falls, preventing pressure ulcers, smoking cessation, powerful choices and Patients Bill of Rights. Pt. given tour of the unit and instruction on use of facility including emergency call light. Program schedule reviewed with patient. Questions regarding the unit addressed. Pt. Search completed and belongings inventoried.

## 2019-11-26 NOTE — PROGRESS NOTES
11/25/19 2014   Patient Belongings   Did you bring any home meds/supplements to the hospital?  No   Patient Belongings locker   Patient Belongings Put in Hospital Secure Location (Security or Locker, etc.) other (see comments)   Belongings Search Yes   Clothing Search Yes   Second Staff Med, RN     1x purse   1x pair socks  1x bra  1x sweater  1x pair blk shoes  1x keyset  1x cell phone  1x nail clipper  Loose change  1x hand    1x perfume roller  1x lip balm  2x small note pads  misc membership cards  1x MNDL  Insurance cards     Security Envelope # [id number]  [facility] Debit #[phone]  [facility]                   Admission:  I am responsible for any personal items that are not sent to the safe or pharmacy.  [facility] is not responsible for loss, theft or damage of any property in my possession.    Signature:  _________________________________ Date: _______  Time: _____                                              Staff Signature:  ____________________________ Date: ________  Time: _____      2nd Staff person, if patient is unable/unwilling to sign:    Signature: ________________________________ Date: ________  Time: _____     Discharge:  [facility] has returned all of my personal belongings:    Signature: _________________________________ Date: ________  Time: _____                                          Staff Signature:  ____________________________ Date: ________  Time: _____

## 2019-11-26 NOTE — H&P
"[facility] Psychiatric H&P Note       Initial History     The patient's care was discussed with the treatment team and chart notes were reviewed.     Patient examined for psychiatric admission.     IDENTIFICATION  Patient is a 34 year old female. Pt sees PCP Monse Wilde. follows Drew Juarez NP for psychiatric care at [facility]. Pt seen on 11/26/19 by Dr. Madrigal.    CHIEF COMPLAINT  \"some people can't be saved...I don't wanna be saved.\"    HISTORY OF PRESENT ILLNESS  This is a 33 year old female with previous psychiatric diagnoses that include major depression, generalized anxiety, and traits of borderline personality disorder. She obtains five previous inpatient mental health hospitalizations, her most recent being 1 month ago here at [facility]. For current admission, the patient was presented to [facility] ED on 11/25/19 from her outpatient psychiatric clinic due to suicidal ideation. The patient stressed to DEC  that she has been feeling more depressed the past few weeks from relationship issues with her . Symptoms of depression include staying in bed, overeating, feeling hopelessness, and sleep difficulties. She has contemplated suicide by overdosing on her Remeron medication. It should be noted that the patient had stopped taking her psychiatric medications since she felt they weren't working. Prior to admission, the patient was undergoing an IOP at [facility], but does not feel this program to be beneficial. She was deemed appropriate for inpatient level of care for safety and stabilization.     On interview with Dr. Madrigal, the patient confirms that she has not been taking her psychiatric medications. When asked why, the patient states, \"some people can't be saved...I don't wanna be saved.\" Dr. Madrigal informs the patient that a petition for commitment and [place] will be filed.     CHEMICAL DEPENDENCY HISTORY  Patient does not obtain a " "chemical dependency history. She has never undergone chemical dependency treatment. Denies illicit drug use.     PAST  PSYCHIATRIC HISTORY  The patient obtains five previous inpatient mental health hospitalizations. Four here at [facility] and another at [facility] in 2015. Her most recent admission was here at [facility] from 10/08/19-10/23/19. History of ECT psychiatric treatment, most recent series being 10/08/19-10/23/19. She has followed Drew Juarez NP through [facility] for psychiatric care. Prior to admission medications include: Abilify, Remeron, and Atarax. Previous psychiatric medications include: Wellbutrin, Hydroxyzine, Trintellix, and Zoloft.     FAMILY HISTORY  Patient believes that her mother had depression and had issues with alcohol. Her biological father emily had issues with mental illness and chemicals. Her maternal aunt likely has depression and chemical use as well. Her eldest son has started exhibiting the same symptoms as the patient. She states that her family is \"anti-doctor.\"    SOCIAL HISTORY  The patient was raised as one of three children before her parents got a divorce. Her oldest son is 16-years-old, her step daughter is 21-years-old and her youngest son is 13-years-old.  Her oldest son has been living with her sister in [place].  She has been living with her mother since she has been  with her  (5-6 years).  She has one adopted brother. She declares that her family was supportive and caring throughout her childhood. She graduated high school and did not obtain further education. Patient was  to her  from [facility] for 15 years, however they have  (have been for the past 5-6 years). The couple has biological child together. Her ex- provides little support in regards of their children. 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      Medications     Medications Prior to Admission   Medication " Sig Dispense Refill Last Dose     mirtazapine (REMERON) 30 MG tablet Take 45 mg by mouth At Bedtime    11/24/2019 at Unknown time     brexpiprazole (REXULTI) 3 MG tablet Take 1 tablet (3 mg) by mouth daily   11/22/2019     hydrOXYzine (ATARAX) 25 MG tablet Take 25 mg by mouth 3 times daily as needed for anxiety    at prn     vortioxetine (TRINTELLIX/BRINTELLIX) 20 MG tablet Take 20 mg by mouth daily   11/22/2019       Scheduled Medications:    brexpiprazole  3 mg Oral Daily     mirtazapine  45 mg Oral At Bedtime     vortioxetine  20 mg Oral Daily     PRNs:  hydrOXYzine      Allergies      No Known Allergies     Previous Medical History     Past Medical History:   Diagnosis Date     Depressive disorder         Medical Review of Systems     /85   Pulse 77   Temp 97.8  F (36.6  C) (Oral)   Resp 16   SpO2 96%   There is no height or weight on file to calculate BMI.    Previous 10-point ROS completed by Sakina Melton MD on 11/25/19 reviewed by Zhang Madrigal MD on November 26, 2019 and is unchanged except for those problems mentioned within the HPI.     Mental Status Examination     Appearance Sitting in chair, dressed in hospital scrubs. Appears stated age.   Attitude Cooperative   Orientation Oriented to person, place, time   Eye Contact Fine   Speech Regular rate, rhythm, volume and tone   Language Normal   Psychomotor Behavior Normal   Mood Depressed   Affect Depressed    Thought Process Goal-Oriented, Intact   Associations Intact   Thought Content Patient is currently positive for suicidal ideation, negative for plan or intent, able to contract no self harm and identify barriers to suicide.  Negative for obsessions, compulsions or psychosis.     Fund of Knowledge Intact   Insight Poor   Judgement Poor   Attention Span & Concentration Intact   Recent & Remote Memory Intact    Gait Normal   Muscle Tone Intact      Labs     Labs reviewed.  No results found for this or any previous visit (from the past  24 hour(s)).       Impression   This is a 33 year old female with previous psychiatric diagnoses that include major depression, generalized anxiety, and traits of borderline personality disorder. She obtains four previous inpatient mental health hospitalizations, her most recent being less than 1 month ago here at [facility]. For current admission, the patient presented to [facility] from her outpatient psychiatric clinic due to suicidal ideation. The patient has experienced increased symptoms of depression and suicidal ideation within the past few weeks. Contemplated suicide by overdosing on her Remeron medication. It should be noted that the patient has stopped taking her psychiatric medications altogether. She was deemed appropriate for inpatient level of care for safety and further stabilization. While meeting with Dr. Madrigal this morning, petition for commitment and [place] were explained to patient. Dr. Madrigal believes this approach is necessary due to the patient being noncompliant with medications outside of the hospital setting, having numerous hospitalizations within the past year, and sabotaging aftercare treatment. Paperwork for commitment will be filled out by Dr. Madrigal.     Assessment of Suicide Risk: Patient currently positive for suicidal ideation.      Diagnoses     1. Major depression, recurrent, severe, without psychotic features.  2. Generalized Anxiety Disorder  3. Traits of Borderline Personality Disorder     Plan     1. Explained side effects, benefits, and complications of medications to the patient, Pt gave verbal consent.  2. Medication changes: no victor  3. Discussed treatment plan with patient and team.  4. Projected length of stay: 7+ days  5. File a petition for commitment       Attestation:   Alessandra LEBLANC, am serving as a scribe on 11/26/2019 to document services personally performed by Zhang Madrigal MD based on my observations and the provider's statements to  me.    Patient ID:  Name: Misty Parham MRN: [id number]  Admission: 11/25/2019 YOB: 1985

## 2019-11-26 NOTE — CONSULTS
Consult Date:  11/26/2019      REQUESTING PROVIDER:  Epi Cho.      REASON FOR CONSULTATION:  Medical evaluation on admission to the psychiatric unit.      HISTORY OF PRESENT ILLNESS:  Ms. Parham is a 34-year-old female with a past medical history of anxiety and depression, who was having an outpatient Psychiatry visit with her NP, Cuco Juarez.  She reported to that provider feeling hopeless, having insomnia, overeating and thoughts of suicide.  She cites familial relationships, particularly her , as the source of her stress.  She feels that she is not able to function at home.  She was referred to [facility] Emergency Department from the Psychiatry office for further evaluation and management.  The patient reports having been off her medications for 3 days.  In the ED, she was placed on a 72-hour hold, as she had suicidal ideation with a plan to overdose.  She was admitted to the psychiatric unit for further evaluation and management.  The Hospitalist Service was asked to perform a medical evaluation on admission to the psychiatric unit.  Aside from overeating and 1 month of intermittent daily, nonbloody, nonpainful diarrhea, which the patient attributes to medications, although she denies any new meds, denies any other symptoms on review of systems.       PAST MEDICAL HISTORY:   1.  Anxiety.   2.  Depression.      PAST SURGICAL HISTORY:  Cholecystectomy for which she tolerated anesthesia well.      ALLERGIES:  NONE.      SOCIAL HISTORY:  The patient receives primary care through the [facility] system, though has not seen her PCP in quite some time.  She is .  Never smoker, nondrinker, nondrug user.  Works at [employer].      FAMILY MEDICAL HISTORY:  Two biologic children are alive and well.      OUTPATIENT MEDICATIONS:    Medications Prior to Admission   Medication Sig Dispense Refill Last Dose     mirtazapine (REMERON) 30 MG tablet Take 45 mg by mouth At Bedtime     11/24/2019 at Unknown time     brexpiprazole (REXULTI) 3 MG tablet Take 1 tablet (3 mg) by mouth daily   11/22/2019     hydrOXYzine (ATARAX) 25 MG tablet Take 25 mg by mouth 3 times daily as needed for anxiety    at prn     vortioxetine (TRINTELLIX/BRINTELLIX) 20 MG tablet Take 20 mg by mouth daily   11/22/2019      REVIEW OF SYSTEMS:  Ten-point review of systems negative aside from what is described in the HPI.      PHYSICAL EXAMINATION:   GENERAL:  A young woman, who appears her stated age without acute distress.  She was sleeping, but woke easily and was cooperative with the exam.   Neuro: +follows commands wiggle toes and show 2 fingers bilat, face symmetric, tongue midline, speech fluent  HEENT: NC/AT, pupils equal round 3mm briskly reactive bilat, sclera nonicteric, noninjected, conjunctiva pink, mouth moist oral mucosa  Neck: supple  Heart: S1S2, no murmurs  Lungs: CTAB upper lobes  Abd:normoactive bowel sounds, soft, nontender, nondisteded  Ext: no edema     IMPRESSION:  Ms. Parham is a 34-year-old woman with a past medical history of anxiety and depression, who was admitted to the psychiatric unit on a 72-hour hold for suicidal ideation and decompensated psychiatric illness.  The Hospitalist Service was asked to perform a medical evaluation on admission to the psychiatric unit.      PROBLEM LIST AND PLAN:   Suicidal ideation with plan for overdose.   Decompensated psychiatric illness.   Depression.   Anxiety.   -- Defer pharmacologic and milieu therapy to the primary Psychiatric Service.   -- Should the patient require ECT, would recommend obtaining an electrocardiogram.     Diarrhea, intermittent for the last 1 month, once a day, nonbloody, no related abdominal pain, in the setting of overeating, but the patient attributes this to medications.  There has been no recent antibiotic use, weight loss, the patient has been able to tolerate and maintain adequate p.o. intake.  The diarrhea does not occur every  day, nor is there any associated blood loss or abdominal pain.   -- We will review outpatient medications and see if any are contributing-->rixulti, trintellix can cause diarrhea and rixulti and remeron can increase appetite.  Defer to psychiatry re: risk/benefit ratio analysis of med adjustment to mitigate potential side effects.   -- If this persists, she can follow up with her primary provider.   -- The patient has been encouraged to reestablish primary care on an annual basis.     Prophylaxis.   -- Ambulate t.i.d.     We await the patient's laboratory data.  If those are within normal limits, the Hospitalist Service will sign off.        The patient will be independently evaluated by Dr. Glenn Nagy.         GLENN NAGY MD       As dictated by TOMMIE OCAMPO, APRN, CNP            D: 2019   T: 2019   MT: DAMIEN      Name:     CHASITY PERKINS   MRN:      [id number]        Account:       [id number]   :      1985           Consult Date:  2019      Document: [id number]       cc: Epi Cho MD

## 2019-11-27 PROCEDURE — 90853 GROUP PSYCHOTHERAPY: CPT

## 2019-11-27 PROCEDURE — 25000132 ZZH RX MED GY IP 250 OP 250 PS 637: Performed by: PSYCHIATRY & NEUROLOGY

## 2019-11-27 PROCEDURE — 12400000 ZZH R&B MH

## 2019-11-27 RX ORDER — LAMOTRIGINE 25 MG/1
25 TABLET ORAL DAILY
Status: DISCONTINUED | OUTPATIENT
Start: 2019-11-27 | End: 2019-12-04

## 2019-11-27 RX ADMIN — BREXPIPRAZOLE 3 MG: 2 TABLET ORAL at 09:27

## 2019-11-27 RX ADMIN — LAMOTRIGINE 25 MG: 25 TABLET ORAL at 09:27

## 2019-11-27 RX ADMIN — MIRTAZAPINE 45 MG: 45 TABLET, FILM COATED ORAL at 22:10

## 2019-11-27 RX ADMIN — VORTIOXETINE 20 MG: 20 TABLET, FILM COATED ORAL at 09:27

## 2019-11-27 ASSESSMENT — ACTIVITIES OF DAILY LIVING (ADL)
DRESS: INDEPENDENT
ORAL_HYGIENE: INDEPENDENT
LAUNDRY: WITH SUPERVISION
LAUNDRY: WITH SUPERVISION
HYGIENE/GROOMING: INDEPENDENT
HYGIENE/GROOMING: INDEPENDENT
ORAL_HYGIENE: INDEPENDENT
DRESS: INDEPENDENT

## 2019-11-27 NOTE — PLAN OF CARE
Problem: Depressive Symptoms  Goal: Depressive Symptoms  Description  Signs and symptoms of listed problems will be absent or manageable.  Outcome: No Change  Flowsheets (Taken 11/27/2019 1303)  Depressive Symptoms Assessed: all  Depressive Symptoms Present: affect; mood; insight; other (see comment)  Note:   Pt presents with a flat, sad affect and depressed mood. Pt states that she is disinterested in leaving her room, and refuses to come out or attend groups despite prompts. Pt's thoughts are organized, but insight is poor and judgement is impaired. Her attention is poor. Pt endorses passive SI but with no plan or intent, and is med-compliant.

## 2019-11-27 NOTE — PROGRESS NOTES
"[facility] Psychiatric Progress Note       Interim History     The patient's care was discussed with the treatment team and chart notes were reviewed. According to nursing and psych associate notes from yesterday evening, the patient had spent majority of her time isolating to her room. She denied group sessions and did not wish to socialize with her peers. Pt seen on 11/27/19 by Dr. Madrigal. Patient reports she's doing alright this morning. She confirms that she spent her entire day yesterday isolated to her room. This behavior is quite normal for the patient when she is both inside and outside of the hospital setting. Patients psychiatric history is further reviewed. The patient has a history of ECT psychiatric treatments. Her last ECT session was approximately 1 month ago here at [facility] (6 series treatment plan). Patient declares this treatment was \"a little bit\" helpful in treating her symptoms of depression and anxiety. Her previous psychiatric medications are also considered. Previous medications include: Zoloft, Remeron, Wellbutrin, Hydroxyzine, Abilify, Trazodone, Seroquel (possibly). Patient denies these medications being effective in treating her symptoms of depression. From this, Dr. Madirgal informs the patient about the medication Lamictal, such as its potential side effects and benefits. After further conversation, the patient provides verbal consent to try this medication. Will order Lamictal 25mg daily. In addition to this, Dr. Madrigal suggests the patient again undergo ECT while inpatient. Patient however denies this form of treatment.      Hospital Course   This is a 33 year old female with previous psychiatric diagnoses that include major depression, generalized anxiety, and traits of borderline personality disorder. She obtains four previous inpatient mental health hospitalizations, her most recent being less than 1 month ago here at [facility]. For current admission, the patient " presented to [facility] from her outpatient psychiatric clinic due to suicidal ideation. The patient has experienced increased symptoms of depression and suicidal ideation within the past few weeks. Contemplated suicide by overdosing on her Remeron medication. It should be noted that the patient has stopped taking her psychiatric medications altogether. She was deemed appropriate for inpatient level of care for safety and further stabilization. While meeting with Dr. Madrigal this morning, petition for commitment and [place] were explained to patient. Dr. Madrigal believes this approach is necessary due to the patient being noncompliant with medications outside of the hospital setting, having numerous hospitalizations within the past year, and sabotaging aftercare treatment. Paperwork for commitment will be filled out by Dr. Madrigal.      Medications     Current Facility-Administered Medications Ordered in Epic   Medication Dose Route Frequency Last Rate Last Dose     brexpiprazole (REXULTI) tablet 3 mg  3 mg Oral Daily   3 mg at 11/26/19 0943     hydrOXYzine (ATARAX) tablet 25 mg  25 mg Oral TID PRN         mirtazapine (REMERON) tablet 45 mg  45 mg Oral At Bedtime   45 mg at 11/26/19 2005     vortioxetine (TRINTELLIX/BRINTELLIX) tablet 20 mg  20 mg Oral Daily   20 mg at 11/26/19 0943     No current [facility]-ordered outpatient medications on file.         Allergies      No Known Allergies     Medical Review of Systems     /70   Pulse 86   Temp 98.7  F (37.1  C) (Oral)   Resp 16   Wt 102.8 kg (226 lb 9.6 oz)   SpO2 97%   BMI 37.71 kg/m    Body mass index is 37.71 kg/m .  A 10-point review of systems was performed by Zhang Madrigal MD and is negative, no new findings.      Psychiatric Examination     Appearance Sitting in chair, dressed in hospital scrubs. Appears stated age.   Attitude Cooperative   Orientation Oriented to person, place, time   Eye Contact Poor   Speech Regular rate, rhythm, volume and  tone   Language Normal   Psychomotor Behavior Normal   Mood Depressed   Affect Flat, quiet   Thought Process Goal-Oriented, Intact   Associations Intact   Thought Content Patient is currently negative for suicidal ideation, negative for plan or intent, able to contract no self harm and identify barriers to suicide.  Negative for obsessions, compulsions or psychosis.     Fund of Knowledge Intact   Insight Poor   Judgement Poor   Attention Span & Concentration Intact   Recent & Remote Memory Intact    Gait Normal   Muscle Tone Intact        Labs     Labs reviewed.  Recent Results (from the past 24 hour(s))   CBC with platelets differential    Collection Time: 11/26/19  7:51 AM   Result Value Ref Range    WBC 6.6 4.0 - 11.0 10e9/L    RBC Count 4.42 3.8 - 5.2 10e12/L    Hemoglobin 13.0 11.7 - 15.7 g/dL    Hematocrit 39.9 35.0 - 47.0 %    MCV 90 78 - 100 fl    MCH 29.4 26.5 - 33.0 pg    MCHC 32.6 31.5 - 36.5 g/dL    RDW 14.8 10.0 - 15.0 %    Platelet Count 298 150 - 450 10e9/L    Diff Method Automated Method     % Neutrophils 60.3 %    % Lymphocytes 28.4 %    % Monocytes 8.9 %    % Eosinophils 1.7 %    % Basophils 0.5 %    % Immature Granulocytes 0.2 %    Nucleated RBCs 0 0 /100    Absolute Neutrophil 4.0 1.6 - 8.3 10e9/L    Absolute Lymphocytes 1.9 0.8 - 5.3 10e9/L    Absolute Monocytes 0.6 0.0 - 1.3 10e9/L    Absolute Eosinophils 0.1 0.0 - 0.7 10e9/L    Absolute Basophils 0.0 0.0 - 0.2 10e9/L    Abs Immature Granulocytes 0.0 0 - 0.4 10e9/L    Absolute Nucleated RBC 0.0    Basic metabolic panel    Collection Time: 11/26/19  7:51 AM   Result Value Ref Range    Sodium 143 133 - 144 mmol/L    Potassium 3.8 3.4 - 5.3 mmol/L    Chloride 111 (H) 94 - 109 mmol/L    Carbon Dioxide 28 20 - 32 mmol/L    Anion Gap 4 3 - 14 mmol/L    Glucose 90 70 - 99 mg/dL    Urea Nitrogen 13 7 - 30 mg/dL    Creatinine 0.84 0.52 - 1.04 mg/dL    GFR Estimate 89 >60 mL/min/[1.73_m2]    GFR Estimate If Black >90 >60 mL/min/[1.73_m2]    Calcium 8.8  8.5 - 10.1 mg/dL   TSH with free T4 reflex (Add on or recollect)    Collection Time: 11/26/19  7:51 AM   Result Value Ref Range    TSH 1.61 0.40 - 4.00 mU/L   HCG qualitative urine    Collection Time: 11/26/19  1:00 PM   Result Value Ref Range    HCG Qual Urine Negative NEG^Negative   Drug abuse screen 77 urine (FL, RH, SH)    Collection Time: 11/26/19  1:00 PM   Result Value Ref Range    Amphetamine Qual Urine Negative NEG^Negative    Barbiturates Qual Urine Negative NEG^Negative    Benzodiazepine Qual Urine Negative NEG^Negative    Cannabinoids Qual Urine Negative NEG^Negative    Cocaine Qual Urine Negative NEG^Negative    Opiates Qualitative Urine Negative NEG^Negative    PCP Qual Urine Negative NEG^Negative        Impression   This is a 33 year old female with previous psychiatric diagnoses that include major depression, generalized anxiety, and traits of borderline personality disorder. She obtains four previous inpatient mental health hospitalizations, her most recent being less than 1 month ago here at [facility]. The patient was again flat in affect, depressed in mood, and said very little in today's interview with Dr. Madrigal. Due to the patient being tried on numerous psychiatric medications in the past and finding none to be effective, Dr. Madrigal informed the patient about the medication Lamictal. After this medication is reviewed in detail, the patient provided verbal consent to try this medication. Lamictal 25mg daily was ordered. As for now, Dr. Madrigal does not wish to file a petition for commitment. However will consider this option if patient does not sign in voluntarily and continues to deny group sessions, socializing, and spend her time isolated to her room.       Diagnoses     1. Major depression, recurrent, severe, without psychotic features.  2. Generalized Anxiety Disorder  3. Traits of Borderline Personality Disorder     Plan     1. Explained side effects, benefits, and complications of  medications to the patient, Pt gave verbal consent.  2. Medication changes: Started Lamictal 25mg daily   3. Discussed treatment plan with patient and team.  4. Projected length of stay: 7+ days       Attestation:   I, Alessandra Chambers, am serving as a scribe on 11/27/2019 to document services personally performed by Zhang Madrigal MD based on my observations and the provider's statements to me.    Patient ID:  Name: Misty Parham    MRN: [id number]  Admission: 11/25/2019   YOB: 1985

## 2019-11-27 NOTE — PLAN OF CARE
Problem: Suicidal Behavior  Goal: Suicidal Behavior is Absent or Managed  Outcome: No Change  Note:   Pt presents with a sad affect and depressed mood. Pt thought process and insight poor. Pt was isolated to her room almost the entire shift, bed resting and napping. Pt did not attend any groups or shower. Pt states she feels depressed and doesn't feel like socializing or leaving her room. Pt endorses Si, contracts for safety.

## 2019-11-28 PROCEDURE — 12400000 ZZH R&B MH

## 2019-11-28 PROCEDURE — 25000132 ZZH RX MED GY IP 250 OP 250 PS 637: Performed by: PSYCHIATRY & NEUROLOGY

## 2019-11-28 RX ADMIN — LAMOTRIGINE 25 MG: 25 TABLET ORAL at 09:10

## 2019-11-28 RX ADMIN — VORTIOXETINE 20 MG: 20 TABLET, FILM COATED ORAL at 09:09

## 2019-11-28 RX ADMIN — BREXPIPRAZOLE 3 MG: 2 TABLET ORAL at 09:09

## 2019-11-28 RX ADMIN — MIRTAZAPINE 45 MG: 45 TABLET, FILM COATED ORAL at 20:39

## 2019-11-28 ASSESSMENT — ACTIVITIES OF DAILY LIVING (ADL)
LAUNDRY: UNABLE TO COMPLETE
LAUNDRY: WITH SUPERVISION
DRESS: INDEPENDENT
ORAL_HYGIENE: INDEPENDENT
DRESS: INDEPENDENT
HYGIENE/GROOMING: INDEPENDENT
ORAL_HYGIENE: INDEPENDENT
HYGIENE/GROOMING: INDEPENDENT

## 2019-11-28 NOTE — PLAN OF CARE
Problem: Depressive Symptoms  Goal: Depressive Symptoms  Description  Signs and symptoms of listed problems will be absent or manageable.  Outcome: No Change  Flowsheets  Taken 11/27/2019 1303 by Domenica Grimes  Depressive Symptoms Assessed: all  Taken 11/28/2019 1217 by Ivana Serrano  Depressive Symptoms Present: affect;mood;anxiety;insight;thought process  Note:   Pt presents with a flat, sad affect and depressed mood. Pt spent nearly the entire shift bed resting or napping. Pt declined unit programming. Pt judgement and insight are impaired. During 1:1, pt states she is sad because it is her son's birthday, and plans to call him later. Pt leg was shaking during 1:1 with staff, answering questions shortly, appearing anxious. Staff encouraged pt to participate in unit programming and to get out of room. Pt denies Si at this time.
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
## 2019-11-29 PROCEDURE — 25000132 ZZH RX MED GY IP 250 OP 250 PS 637: Performed by: PSYCHIATRY & NEUROLOGY

## 2019-11-29 PROCEDURE — 12400000 ZZH R&B MH

## 2019-11-29 RX ADMIN — BREXPIPRAZOLE 3 MG: 2 TABLET ORAL at 08:29

## 2019-11-29 RX ADMIN — MIRTAZAPINE 45 MG: 45 TABLET, FILM COATED ORAL at 22:10

## 2019-11-29 RX ADMIN — VORTIOXETINE 20 MG: 20 TABLET, FILM COATED ORAL at 08:30

## 2019-11-29 RX ADMIN — LAMOTRIGINE 25 MG: 25 TABLET ORAL at 08:30

## 2019-11-29 ASSESSMENT — ACTIVITIES OF DAILY LIVING (ADL)
HYGIENE/GROOMING: INDEPENDENT
ORAL_HYGIENE: INDEPENDENT
LAUNDRY: WITH SUPERVISION
DRESS: INDEPENDENT

## 2019-11-29 NOTE — PROGRESS NOTES
[facility] Psychiatric Progress Note       Interim History     The patient's care was discussed with the treatment team and chart notes were reviewed. Per nursing and psych associate notes, the patient remains isolative and withdrawn. She has not been attending groups regularly. The patient is positive for suicidal ideation. Pt seen on 11/29/19 by Dr. Madrigal. Patient denies any side effects from her Lamictal medication. Dr. Madrigal reminds the patient of the titration process of Lamictal and the patient agrees to continue with this medication.      Hospital Course   This is a 33 year old female with previous psychiatric diagnoses that include major depression, generalized anxiety, and traits of borderline personality disorder. She obtains four previous inpatient mental health hospitalizations, her most recent being less than 1 month ago here at [facility]. For current admission, the patient presented to [facility] from her outpatient psychiatric clinic due to suicidal ideation. The patient has experienced increased symptoms of depression and suicidal ideation within the past few weeks. Contemplated suicide by overdosing on her Remeron medication. It should be noted that the patient has stopped taking her psychiatric medications altogether. She was deemed appropriate for inpatient level of care for safety and further stabilization. While meeting with Dr. Madrigal this morning, petition for commitment and [place] were explained to patient. Dr. Madrigal believes this approach is necessary due to the patient being noncompliant with medications outside of the hospital setting, having numerous hospitalizations within the past year, and sabotaging aftercare treatment. Paperwork for commitment will be filled out by Dr. Madrigal.      Medications     Current Facility-Administered Medications Ordered in Epic   Medication Dose Route Frequency Last Rate Last Dose     brexpiprazole (REXULTI) tablet 3 mg  3 mg  Oral Daily   3 mg at 11/28/19 0909     hydrOXYzine (ATARAX) tablet 25 mg  25 mg Oral TID PRN         lamoTRIgine (LaMICtal) tablet 25 mg  25 mg Oral Daily   25 mg at 11/28/19 0910     mirtazapine (REMERON) tablet 45 mg  45 mg Oral At Bedtime   45 mg at 11/28/19 2039     vortioxetine (TRINTELLIX/BRINTELLIX) tablet 20 mg  20 mg Oral Daily   20 mg at 11/28/19 0909     No current [facility]-ordered outpatient medications on file.         Allergies      No Known Allergies     Medical Review of Systems     /72   Pulse 77   Temp 98.5  F (36.9  C) (Oral)   Resp 16   Wt 102.8 kg (226 lb 9.6 oz)   SpO2 100%   BMI 37.71 kg/m    Body mass index is 37.71 kg/m .  A 10-point review of systems was performed by Zhang Madrigal MD and is negative, no new findings.      Psychiatric Examination     Appearance Sitting in chair, dressed in hospital scrubs. Appears stated age.   Attitude Cooperative   Orientation Oriented to person, place, time   Eye Contact Poor   Speech Regular rate, rhythm, volume and tone   Language Normal   Psychomotor Behavior Normal   Mood Depressed   Affect Flat, quiet   Thought Process Goal-Oriented, Intact   Associations Intact   Thought Content Patient is currently positive for suicidal ideation, negative for plan or intent, able to contract no self harm and identify barriers to suicide.  Negative for obsessions, compulsions or psychosis.     Fund of Knowledge Intact   Insight Poor   Judgement Poor   Attention Span & Concentration Intact   Recent & Remote Memory Intact    Gait Normal   Muscle Tone Intact        Labs     Labs reviewed.  No results found for this or any previous visit (from the past 24 hour(s)).     Impression   This is a 33 year old female with previous psychiatric diagnoses that include major depression, generalized anxiety, and traits of borderline personality disorder. She obtains four previous inpatient mental health hospitalizations, her most recent being less than 1 month ago here  at [facility]. The patient is still not attending groups and is positive for suicidal ideation. The patient tolerates her medications and denies any side effects. Dr. Madrigal reminded the patient of the titration process with Lamictal and the patient agreed to continue with this medication.      Diagnoses     1. Major depression, recurrent, severe, without psychotic features.  2. Generalized Anxiety Disorder  3. Traits of Borderline Personality Disorder     Plan     1. Explained side effects, benefits, and complications of medications to the patient, Pt gave verbal consent.  2. Medication changes: None.   3. Discussed treatment plan with patient and team.  4. Projected length of stay: 7+ days       Attestation:   IAlessandra, am serving as a scribe on 11/29/2019 to document services personally performed by Zhang Madrigal MD based on my observations and the provider's statements to me.    Patient ID:  Name: Misty Parham    MRN: [id number]  Admission: 11/25/2019   YOB: 1985

## 2019-11-29 NOTE — PLAN OF CARE
Problem: Depressive Symptoms  Goal: Depressive Symptoms  Description  Signs and symptoms of listed problems will be absent or manageable.  Outcome: No Change  Flowsheets  Taken 11/27/2019 1303 by Domenica Grimes  Depressive Symptoms Assessed: all  Taken 11/28/2019 1217 by Ivana Serrano  Depressive Symptoms Present: affect;mood;anxiety;insight;thought process  Note:   Pt presents with a sad affect and depressed mood. Pt thought process and insight impaired. Pt was a bit more visible this shift, but still mostly isolated to room. Pt attended community meeting and focus group. During 1:1, pt stated she is feeling the same as the past few days, down and sad. Pt said she felt sad when she called her son for his birthday yesterday, because he doesn't understand her mental illness and was disappointed that she couldn't be there for it. Pt states she feels like a terrible mom. Staff encouraged pt to go to groups and get out of room, and possibly journal all the ways she is a good mother. Pt denied Si during 1:1.

## 2019-11-29 NOTE — PLAN OF CARE
Pt has been isolative and withdrawn in her room throughout the evening shift. Presents a blunted, depressed affect; mood is sad and hopeless. No shower; untidy. No groups. Respectful to peers and staff. Stares off into space and at the floor when communicating; sometimes examiner.

## 2019-11-30 PROCEDURE — 25000132 ZZH RX MED GY IP 250 OP 250 PS 637: Performed by: PSYCHIATRY & NEUROLOGY

## 2019-11-30 PROCEDURE — 12400000 ZZH R&B MH

## 2019-11-30 RX ADMIN — VORTIOXETINE 20 MG: 20 TABLET, FILM COATED ORAL at 09:24

## 2019-11-30 RX ADMIN — MIRTAZAPINE 45 MG: 45 TABLET, FILM COATED ORAL at 20:17

## 2019-11-30 RX ADMIN — LAMOTRIGINE 25 MG: 25 TABLET ORAL at 09:24

## 2019-11-30 RX ADMIN — BREXPIPRAZOLE 3 MG: 2 TABLET ORAL at 09:24

## 2019-11-30 ASSESSMENT — ACTIVITIES OF DAILY LIVING (ADL)
ORAL_HYGIENE: INDEPENDENT
DRESS: SCRUBS (BEHAVIORAL HEALTH)
LAUNDRY: WITH SUPERVISION
HYGIENE/GROOMING: INDEPENDENT
HYGIENE/GROOMING: INDEPENDENT
ORAL_HYGIENE: INDEPENDENT
LAUNDRY: WITH SUPERVISION
DRESS: SCRUBS (BEHAVIORAL HEALTH)

## 2019-11-30 NOTE — PLAN OF CARE
patient spent most of the shift in her room bed resting and journaling  . She did come into the lounge for meals but was withdrawn/isolative. On 1:1 she still endorses feeling like she would overdose if she was to discharge today.  States she would hurt herself here if she could figure out a way to do so. Her mood is depressed. Endorses feeling hopeless. Affect is flat and blunt. States she is feeling angry and frustrated about being here in the hospital and wishes she could leave but states she believes she would be committed if she insisted on leaving so she is willing to stay.

## 2019-11-30 NOTE — PLAN OF CARE
Pt presents with a flat affect, depressed mood and is isolative to her room. She was up to eat dinner in the lounge and showered this shift. Pt did not attend any groups. During 1:1 reported that she was doing good. She engaged in journaling this evening.

## 2019-12-01 PROCEDURE — 25000132 ZZH RX MED GY IP 250 OP 250 PS 637: Performed by: PSYCHIATRY & NEUROLOGY

## 2019-12-01 PROCEDURE — 12400000 ZZH R&B MH

## 2019-12-01 RX ADMIN — MIRTAZAPINE 45 MG: 45 TABLET, FILM COATED ORAL at 20:14

## 2019-12-01 RX ADMIN — BREXPIPRAZOLE 3 MG: 2 TABLET ORAL at 12:23

## 2019-12-01 RX ADMIN — LAMOTRIGINE 25 MG: 25 TABLET ORAL at 12:23

## 2019-12-01 RX ADMIN — VORTIOXETINE 20 MG: 20 TABLET, FILM COATED ORAL at 12:23

## 2019-12-01 ASSESSMENT — ACTIVITIES OF DAILY LIVING (ADL)
HYGIENE/GROOMING: INDEPENDENT
ORAL_HYGIENE: INDEPENDENT
DRESS: SCRUBS (BEHAVIORAL HEALTH)
LAUNDRY: WITH SUPERVISION

## 2019-12-01 NOTE — PLAN OF CARE
"Patient spent the bulk of the shift in her room bed resting. She did come to the lounge for meals. States she is \"fine\" and her mood is \"the same\"  patient gives brief answers to questions. Appears less animated during interactions than she did earlier in the day.  Mood is depress, sad, anxious. Affect is flat and blunt. States she would hurt herself if she were discharged at this time. Is able to contract for safety.      "

## 2019-12-01 NOTE — PLAN OF CARE
patient spent the shift in her room bed resting coming out for lunch only. States she is still having chronic thoughts of over dosing. States she feels safe here. Mood is sad, depressed. Affect is blunt and flat.

## 2019-12-02 PROCEDURE — 25000132 ZZH RX MED GY IP 250 OP 250 PS 637: Performed by: PSYCHIATRY & NEUROLOGY

## 2019-12-02 PROCEDURE — 12400000 ZZH R&B MH

## 2019-12-02 RX ADMIN — MIRTAZAPINE 45 MG: 45 TABLET, FILM COATED ORAL at 20:34

## 2019-12-02 RX ADMIN — LAMOTRIGINE 25 MG: 25 TABLET ORAL at 08:37

## 2019-12-02 RX ADMIN — VORTIOXETINE 20 MG: 20 TABLET, FILM COATED ORAL at 08:36

## 2019-12-02 RX ADMIN — BREXPIPRAZOLE 3 MG: 2 TABLET ORAL at 08:36

## 2019-12-02 ASSESSMENT — ACTIVITIES OF DAILY LIVING (ADL)
ORAL_HYGIENE: INDEPENDENT
HYGIENE/GROOMING: INDEPENDENT
DRESS: SCRUBS (BEHAVIORAL HEALTH)
LAUNDRY: WITH SUPERVISION
HYGIENE/GROOMING: INDEPENDENT
LAUNDRY: WITH SUPERVISION
ORAL_HYGIENE: INDEPENDENT
DRESS: SCRUBS (BEHAVIORAL HEALTH)

## 2019-12-02 NOTE — PLAN OF CARE
Patient states that she has chronic thoughts of suicide and her plan is to overdose when she is out of the hospital.  Her affect is flat. Thoughts are racing. She is isolative to her room but comes out for meals. Refuses to attend groups. Spent her time reading a book in bed today. Rated her depression at a 10. She does not feel that anything helps to decrease her depression

## 2019-12-02 NOTE — PROGRESS NOTES
IN THE MATTER OF:                                                                                       EXHIBIT A  Misty Parham, Respondent  The following observations of the Respondent s behavior provide a factual basis for believing the Respondent is Mentally Ill and in need of hospitalization.  Misty Parham was admitted to [facility] on 11/25/2019.    Dr. Zhang Madrigal MD notes on 11/25/209,  This is a 33 year old female with previous psychiatric diagnoses that include major depression, generalized anxiety, and traits of borderline personality disorder. She obtains five previous inpatient mental health hospitalizations, her most recent being 1 month ago here at [facility]. For current admission, the patient was presented to [facility] ED on 11/25/19 from her outpatient psychiatric clinic due to suicidal ideation. The patient stressed to DEC  that she has been feeling more depressed the past few weeks from relationship issues with her . Symptoms of depression include staying in bed, overeating, feeling hopelessness, and sleep difficulties. She has contemplated suicide by overdosing on her Remeron medication. It should be noted that the patient had stopped taking her psychiatric medications since she felt they weren't working. Prior to admission, the patient was undergoing an IOP at [facility], but does not feel this program to be beneficial. She was deemed appropriate for inpatient level of care for safety and stabilization. On interview with Dr. Madrigal, the patient confirms that she has not been taking her psychiatric medications. When asked why, the patient states,  some people can't be saved...I don't wanna be saved .   Dr. Sakina Melton MD noted on 11/25/2019,  Misty Parham is a 34 year old female with history of anxiety and depression who presents to the emergency department today with suicidal ideation.  She states that she stopped taking her  "medications 3 days ago because she feels \"hopeless\".   M. S., RN noted on 11/25/2019,  Was at  a counseling appointment today and was not able to contract for safety. Talks about not being able to function at home,sleeps a lot and  Eating.Plan to over dose on medications. Previously admitted to [facility] and had ECT treatments. Not complaint with medications. Will contract for safety but continue to monitor closely.   EJ HILTON Psychiatric Associate noted on 11/26/2019,  Pt presents with a sad affect and depressed mood. Pt thought process and insight poor. Pt was isolated to her room almost the entire shift, bed resting and napping. Pt did not attend any groups or shower. Pt states she feels depressed and doesn't feel like socializing or leaving her room. Pt endorses Si, contracts for safety.   HARRIET AGUILERA Psychiatric Associate noted on 11/27/2019,  Pt presents with a flat, sad affect and depressed mood. Pt states that she is disinterested in leaving her room, and refuses to come out or attend groups despite prompts. Pt's thoughts are organized, but insight is poor and judgement is impaired. Her attention is poor. Pt endorses passive SI but with no plan or intent, and is med-compliant.   MICHELLE MENDEZ Psychiatric Associate noted on 11/27/2019,  At beginning of shift, Pt told Maykel she was having suicide ideation. Pt reported she knows she can't do anything here but thinks about over-dosing when she returns home.   C. A., Psychiatric Associate noted on 11/28/2019,  Pt has been isolative and withdrawn in her room throughout the evening shift. Presents a blunted, depressed affect; mood is sad and hopeless. No shower; untidy. No groups. Respectful to peers and staff. Stares off into space and at the floor when communicating; sometimes examiner.   Dr. Zhang Madrigal MD noted on 11/29/2019,  The patient is positive for suicidal ideation.   MICHELLE ESPITIA RN noted on 11/30/2019 at 2:48 PM,   On 1:1 she still endorses feeling like she would overdose " "if she was to discharge today.  States she would hurt herself here if she could figure out a way to do so. Her mood is depressed. Endorses feeling hopeless. Affect is flat and blunt. States she is feeling angry and frustrated about being here in the hospital and wishes she could leave.   LANA MURPHY noted on 11/30/2019 at 6:57 PM,  States she would hurt herself if she were discharged at this time.   MICHELLE ESPITIA RN noted on 12/1/2019,  States she is still having chronic thoughts of over dosing.   M. N., Psychiatric Associate noted on 12/1/2019,  Pt states that she feels \"dead inside\" and cannot see how anything would ever get any better.  Thought process is poor. Insight is poor. Pt states she has suicidal ideation at this time and may act on if outside of hospital.     Dr. Zhang Madrigal MD noted on 12/2/2019,  Over the weekend, the patient was medication compliant, cooperative in overall care, however continued to be withdrawn and isolative to her room. She expressed feelings of hopelessness throughout the weekend. Pt seen on 12/02/19 by Dr. Madrigal. Patient notes she's doing \"so-so\" this morning and isn't able to elaborate what that means. Dr. Madrigal reviews the patients medications, and notes that the new medication, Lamictal, will take time to notice any difference. From this, Dr. Madrigal believes a series of ECT would be the patients best option at this time. After explaining why he believes this, the patient states, \"no I don't want to,\" and eventually says, \"I don't want help.\" Dr. Madrigal will file a petition for commitment. unwilling to take medications, unwilling to do ECT.   Dr. Zhang Madrigal MD summarizes in his examiners support statement,  Patient has long history of major depression.  Has a history of non-compliance with medications as an outpatient.  Also has numerous previous mental health hospitalizations.  Patient is refusing ECT, which has helped in the past.   Diagnostic Impression " and Conclusion:  Major Depression and Borderline Personality Disorder  Dr. Zhang Madrigal MD recommends commitment as respondent is danger to herself and [place] for medication compliance.  Based on the above information, it is the intent of [facility] to pursue this administrative petition for commitment and ask for the court s intervention in determining the most appropriate course of treatment.

## 2019-12-02 NOTE — PLAN OF CARE
"Shift Update: Pt mood is depressed and withdrawn. Thought process is impaired. Insight is poor. Pt positive suicidal ideation states\"10\". Recent Med changes . BGM , Abnormal labs/ Vital signs  Privileges , CIWA . ECT . Plan   During 1:1 minimal verbal response. Has not showered since Friday. Encourage to come out of room-refuses and continues to read book. Will contract for safety but continue to monitor closely.   "

## 2019-12-02 NOTE — PROGRESS NOTES
Notified [facility] [phone] fax [phone] of [place] petition for Commitment and [place].      [facility] and left a message that she would be out Tuesday, December 3 in the morning to see patient.  A packet of information was left by patient's chart for her that includes original documents.  [phone]    Signed petition documents were faxed to the court and copied placed on the chart.  The original documents are in an envelope for Patricia, placed with patient's chart.

## 2019-12-02 NOTE — PLAN OF CARE
"Shift Update: Pt mood is flat and depressed; affect is sad. Pt states that she feels \"dead inside\" and cannot see how anything would ever get any better. Pt states she has been ruminating on how her relationship with family is suffering. Pt isolated to her room for the entire shift besides coming to the lounge for evening meal. Pt was able to maintain concentration to start a new book.  Thought process is poor. Insight is poor. Pt states she has suicidal ideation at this time and may act on if outside of hospital. Pt contracts for safety while here.   "

## 2019-12-02 NOTE — PROGRESS NOTES
"[facility] Psychiatric Progress Note       Interim History     The patient's care was discussed with the treatment team and chart notes were reviewed. Over the weekend, the patient was medication compliant, cooperative in overall care, however continued to be withdrawn and isolative to her room. She expressed feelings of hopelessness throughout the weekend. Pt seen on 12/02/19 by Dr. Madrigal. Patient notes she's doing \"so-so\" this morning and isn't able to elaborate what that means. Dr. Madrigal reviews the patients medications, and notes that the new medication, Lamictal, will take time to notice any difference. From this, Dr. Madrigal believes a series of ECT would be the patients best option at this time. After explaining why he believes this, the patient states, \"no I don't want to,\" and eventually says, \"I don't want help.\" Dr. Madrigal will file a petition for commitment. unwilling to take medications, unwilling to do ECT     Hospital Course   This is a 33 year old female with previous psychiatric diagnoses that include major depression, generalized anxiety, and traits of borderline personality disorder. She obtains four previous inpatient mental health hospitalizations, her most recent being less than 1 month ago here at [facility]. For current admission, the patient presented to [facility] from her outpatient psychiatric clinic due to suicidal ideation. The patient has experienced increased symptoms of depression and suicidal ideation within the past few weeks. Contemplated suicide by overdosing on her Remeron medication. It should be noted that the patient has stopped taking her psychiatric medications altogether. She was deemed appropriate for inpatient level of care for safety and further stabilization. While meeting with Dr. Madrigal this morning, petition for commitment and [place] were explained to patient. Dr. Madrigal believes this approach is necessary due to the patient being " noncompliant with medications outside of the hospital setting, having numerous hospitalizations within the past year, and sabotaging aftercare treatment. Paperwork for commitment will be filled out by Dr. Madrigal.      Medications     Current Facility-Administered Medications Ordered in Epic   Medication Dose Route Frequency Last Rate Last Dose     brexpiprazole (REXULTI) tablet 3 mg  3 mg Oral Daily   3 mg at 12/01/19 1223     hydrOXYzine (ATARAX) tablet 25 mg  25 mg Oral TID PRN         lamoTRIgine (LaMICtal) tablet 25 mg  25 mg Oral Daily   25 mg at 12/01/19 1223     mirtazapine (REMERON) tablet 45 mg  45 mg Oral At Bedtime   45 mg at 12/01/19 2014     vortioxetine (TRINTELLIX/BRINTELLIX) tablet 20 mg  20 mg Oral Daily   20 mg at 12/01/19 1223     No current [facility]-ordered outpatient medications on file.         Allergies      No Known Allergies     Medical Review of Systems     BP 94/69   Pulse 85   Temp 98.6  F (37  C) (Oral)   Resp 16   Wt 102.8 kg (226 lb 9.6 oz)   SpO2 96%   BMI 37.71 kg/m    Body mass index is 37.71 kg/m .  A 10-point review of systems was performed by Zhang Madrigal MD and is negative, no new findings.      Psychiatric Examination     Appearance Sitting in chair, dressed in hospital scrubs. Appears stated age.   Attitude Cooperative   Orientation Oriented to person, place, time   Eye Contact Poor   Speech Regular rate, rhythm, volume and tone   Language Normal   Psychomotor Behavior Normal   Mood Depressed   Affect Flat, quiet   Thought Process Goal-Oriented, Intact   Associations Intact   Thought Content Patient is currently positive for suicidal ideation, negative for plan or intent, able to contract no self harm and identify barriers to suicide.  Negative for obsessions, compulsions or psychosis.     Fund of Knowledge Intact   Insight Poor   Judgement Poor   Attention Span & Concentration Intact   Recent & Remote Memory Intact    Gait Normal   Muscle Tone Intact        Labs      Labs reviewed.  No results found for this or any previous visit (from the past 24 hour(s)).     Impression   This is a 33 year old female with previous psychiatric diagnoses that include major depression, generalized anxiety, and traits of borderline personality disorder. She obtains four previous inpatient mental health hospitalizations, her most recent being less than 1 month ago here at [facility].      Diagnoses     1. Major depression, recurrent, severe, without psychotic features.  2. Generalized Anxiety Disorder  3. Traits of Borderline Personality Disorder     Plan     1. Explained side effects, benefits, and complications of medications to the patient, Pt gave verbal consent.  2. Medication changes: None.   3. Discussed treatment plan with patient and team.  4. Projected length of stay: 7+ days       Attestation:   I, Alessandra Chambers, am serving as a scribe on 12/02/2019 to document services personally performed by Zhang Madrigal MD based on my observations and the provider's statements to me.    Patient ID:  Name: Misty Parham    MRN: [id number]  Admission: 11/25/2019   YOB: 1985

## 2019-12-03 PROCEDURE — 25000132 ZZH RX MED GY IP 250 OP 250 PS 637: Performed by: PSYCHIATRY & NEUROLOGY

## 2019-12-03 PROCEDURE — 12400000 ZZH R&B MH

## 2019-12-03 RX ADMIN — MIRTAZAPINE 45 MG: 45 TABLET, FILM COATED ORAL at 21:12

## 2019-12-03 RX ADMIN — BREXPIPRAZOLE 3 MG: 2 TABLET ORAL at 08:59

## 2019-12-03 RX ADMIN — LAMOTRIGINE 25 MG: 25 TABLET ORAL at 08:59

## 2019-12-03 RX ADMIN — VORTIOXETINE 20 MG: 20 TABLET, FILM COATED ORAL at 08:58

## 2019-12-03 ASSESSMENT — ACTIVITIES OF DAILY LIVING (ADL)
LAUNDRY: UNABLE TO COMPLETE
HYGIENE/GROOMING: INDEPENDENT
DRESS: INDEPENDENT
HYGIENE/GROOMING: INDEPENDENT
LAUNDRY: WITH SUPERVISION
ORAL_HYGIENE: INDEPENDENT
ORAL_HYGIENE: INDEPENDENT
DRESS: INDEPENDENT

## 2019-12-03 NOTE — PROGRESS NOTES
"[facility] Psychiatric Progress Note       Interim History     The patient's care was discussed with the treatment team and chart notes were reviewed. Pt seen on 12/03/19 by Dr. Madrigal. The patient reports she's doing alright this morning. Dr. Madrigal again reminds the patient that spending her time isolative to her room and denying group sessions is not appropriate nor helpful in her care. He suggests ECT once again, however the patient denies this form of treatment because, \"I don't like the memory loss.\" Aside from this, our  notified [facility] yesterday afternoon of [place] petition for commitment and [place]. An individual from Prep[facility] Screening will be at the hospital sometime today to meet with patient.      Hospital Course   This is a 33 year old female with previous psychiatric diagnoses that include major depression, generalized anxiety, and traits of borderline personality disorder. She obtains four previous inpatient mental health hospitalizations, her most recent being less than 1 month ago here at [facility]. For current admission, the patient presented to [facility] from her outpatient psychiatric clinic due to suicidal ideation. The patient has experienced increased symptoms of depression and suicidal ideation within the past few weeks. Contemplated suicide by overdosing on her Remeron medication. It should be noted that the patient has stopped taking her psychiatric medications altogether. She was deemed appropriate for inpatient level of care for safety and further stabilization. While meeting with Dr. Madrigal this morning, petition for commitment and [place] were explained to patient. Dr. Madrigal believes this approach is necessary due to the patient being noncompliant with medications outside of the hospital setting, having numerous hospitalizations within the past year, and sabotaging aftercare treatment. Paperwork for commitment will be " filled out by Dr. Madrigal.      Medications     Current Facility-Administered Medications Ordered in Epic   Medication Dose Route Frequency Last Rate Last Dose     brexpiprazole (REXULTI) tablet 3 mg  3 mg Oral Daily   3 mg at 12/02/19 0836     hydrOXYzine (ATARAX) tablet 25 mg  25 mg Oral TID PRN         lamoTRIgine (LaMICtal) tablet 25 mg  25 mg Oral Daily   25 mg at 12/02/19 0837     mirtazapine (REMERON) tablet 45 mg  45 mg Oral At Bedtime   45 mg at 12/02/19 2034     vortioxetine (TRINTELLIX/BRINTELLIX) tablet 20 mg  20 mg Oral Daily   20 mg at 12/02/19 0836     No current [facility]-ordered outpatient medications on file.         Allergies      No Known Allergies     Medical Review of Systems     /89   Pulse 87   Temp 98.8  F (37.1  C) (Oral)   Resp 16   Wt 102.8 kg (226 lb 9.6 oz)   SpO2 97%   BMI 37.71 kg/m    Body mass index is 37.71 kg/m .  A 10-point review of systems was performed by Radha Larson MD and is negative, no new findings.      Psychiatric Examination     Appearance Sitting in chair, dressed in hospital scrubs. Appears stated age.   Attitude Cooperative   Orientation Oriented to person, place, time   Eye Contact Poor   Speech Regular rate, rhythm, volume and tone   Language Normal   Psychomotor Behavior Normal   Mood Depressed   Affect Flat, quiet   Thought Process Goal-Oriented, Intact   Associations Intact   Thought Content Patient is currently positive for suicidal ideation, negative for plan or intent, able to contract no self harm and identify barriers to suicide.  Negative for obsessions, compulsions or psychosis.     Fund of Knowledge Intact   Insight Poor   Judgement Poor   Attention Span & Concentration Intact   Recent & Remote Memory Intact    Gait Normal   Muscle Tone Intact        Labs     Labs reviewed.  No results found for this or any previous visit (from the past 24 hour(s)).     Impression   This is a 33 year old female with previous psychiatric diagnoses  that include major depression, generalized anxiety, and traits of borderline personality disorder. She obtains four previous inpatient mental health hospitalizations, her most recent being less than 1 month ago here at [facility]. Paperwork for petition for commitment was filed yesterday morning by Dr. Madrigal. Our  notified [facility] yesterday afternoon of [place] petition for commitment and [place]. An individual from [facility] will be at the hospital sometime today to meet with patient.      Diagnoses     1. Major depression, recurrent, severe, without psychotic features.  2. Generalized Anxiety Disorder  3. Traits of Borderline Personality Disorder     Plan     1. Explained side effects, benefits, and complications of medications to the patient, Pt gave verbal consent.  2. Medication changes: None.   3. Discussed treatment plan with patient and team.  4. Projected length of stay: 7+ days   5. Petition for commitment filed.       Attestation:   I, Alessandra Chambers, am serving as a scribe on 12/03/2019 to document services personally performed by Zhang Madrigal MD based on my observations and the provider's statements to me.    Patient ID:  Name: Misty Parham    MRN: [id number]  Admission: 11/25/2019   YOB: 1985

## 2019-12-03 NOTE — PLAN OF CARE
Problem: Depressive Symptoms  Goal: Depressive Symptoms  Description  Signs and symptoms of listed problems will be absent or manageable.  Outcome: No Change  Flowsheets  Taken 11/27/2019 1303 by Domenica Grimes  Depressive Symptoms Assessed: all  Taken 11/28/2019 1217 by Ivana Serrano  Depressive Symptoms Present: affect;mood;anxiety;insight;thought process  Note:   Pt presents with a blunt, sad affect and depressed mood. Pt thought process and judgement is impaired, insight is poor. Pt was isolated to her room nearly the entire shift, bed resting and napping. Pt was encouraged to get out of room and attend unit programming early on in the shift, but pt denied. Pt responds to staff with few, quiet words. Pt states she hasn't been feeling any better, still having fleeting thoughts of Si, with no stated plan.

## 2019-12-04 PROCEDURE — 25000132 ZZH RX MED GY IP 250 OP 250 PS 637: Performed by: PSYCHIATRY & NEUROLOGY

## 2019-12-04 PROCEDURE — 12400000 ZZH R&B MH

## 2019-12-04 RX ORDER — MIRTAZAPINE 45 MG/1
TABLET, FILM COATED ORAL
Status: DISCONTINUED
Start: 2019-12-04 | End: 2019-12-05 | Stop reason: HOSPADM

## 2019-12-04 RX ORDER — LAMOTRIGINE 25 MG/1
50 TABLET ORAL DAILY
Status: DISCONTINUED | OUTPATIENT
Start: 2019-12-04 | End: 2019-12-06

## 2019-12-04 RX ADMIN — MIRTAZAPINE 45 MG: 45 TABLET, FILM COATED ORAL at 20:37

## 2019-12-04 RX ADMIN — LAMOTRIGINE 50 MG: 25 TABLET ORAL at 09:19

## 2019-12-04 RX ADMIN — VORTIOXETINE 20 MG: 20 TABLET, FILM COATED ORAL at 09:19

## 2019-12-04 RX ADMIN — BREXPIPRAZOLE 3 MG: 2 TABLET ORAL at 09:19

## 2019-12-04 ASSESSMENT — ACTIVITIES OF DAILY LIVING (ADL)
LAUNDRY: WITH SUPERVISION
DRESS: SCRUBS (BEHAVIORAL HEALTH)
DRESS: INDEPENDENT
LAUNDRY: UNABLE TO COMPLETE
ORAL_HYGIENE: INDEPENDENT
ORAL_HYGIENE: INDEPENDENT
HYGIENE/GROOMING: INDEPENDENT
HYGIENE/GROOMING: SHOWER;PROMPTS

## 2019-12-04 NOTE — PLAN OF CARE
Patient is A&O. VSS. presents as sad  mood is depressed, with flat affect. Speech is quiet with one word answers. Says she does not feel motivated to help herself. Thought process is impaired. Spent the majority of the evening bed resting. She was only out of the room for meals and medications. Lacks insight into her situation. Continues to endorse suicidal ideation. Regular diet. Up independently. Denies pain.

## 2019-12-04 NOTE — PROGRESS NOTES
"[facility] Psychiatric Progress Note       Interim History     The patient was seen on SDU. Has bee here for 9 days.  Got ECT in October.  Flat affect, isolates.  Not interested in ECT due to memory loss.  Dr Madrigal is also her outpatient psychiatrist.  [facility] supported commitment and [facility]. Non complaint with meds as \"they don't work\".     Hospital Course   This is a 33 year old female with previous psychiatric diagnoses that include major depression, generalized anxiety, and traits of borderline personality disorder. She obtains four previous inpatient mental health hospitalizations, her most recent being less than 1 month ago here at [facility]. For current admission, the patient presented to [facility] from her outpatient psychiatric clinic due to suicidal ideation. The patient has experienced increased symptoms of depression and suicidal ideation within the past few weeks. Contemplated suicide by overdosing on her Remeron medication. It should be noted that the patient has stopped taking her psychiatric medications altogether. She was deemed appropriate for inpatient level of care for safety and further stabilization. While meeting with Dr. Madrigal this morning, petition for commitment and [facility] were explained to patient. Dr. Madrigal believes this approach is necessary due to the patient being noncompliant with medications outside of the hospital setting, having numerous hospitalizations within the past year, and sabotaging aftercare treatment. Paperwork for commitment will be filled out by Dr. Madrigal.      Medications     Current Facility-Administered Medications Ordered in Epic   Medication Dose Route Frequency Last Rate Last Dose     brexpiprazole (REXULTI) tablet 3 mg  3 mg Oral Daily   3 mg at 12/03/19 0859     hydrOXYzine (ATARAX) tablet 25 mg  25 mg Oral TID PRN         lamoTRIgine (LaMICtal) tablet 25 mg  25 mg Oral Daily   25 mg at 12/03/19 0859     mirtazapine (REMERON) " tablet 45 mg  45 mg Oral At Bedtime   45 mg at 12/03/19 2112     vortioxetine (TRINTELLIX/BRINTELLIX) tablet 20 mg  20 mg Oral Daily   20 mg at 12/03/19 0858     No current [facility]-ordered outpatient medications on file.         Allergies      No Known Allergies     Medical Review of Systems     /79   Pulse 85   Temp 98.3  F (36.8  C) (Oral)   Resp 15   Wt 103.6 kg (228 lb 6.4 oz)   SpO2 99%   BMI 38.01 kg/m    Body mass index is 38.01 kg/m .  A 10-point review of systems was performed by Zhang Madrigal MD and is negative, no new findings.      Psychiatric Examination     Appearance Sitting in chair, dressed in hospital scrubs. Appears stated age.   Attitude Cooperative   Orientation Oriented to person, place, time   Eye Contact Poor   Speech Regular rate, rhythm, volume and tone   Language Normal   Psychomotor Behavior Normal   Mood Depressed   Affect Flat, quiet   Thought Process Goal-Oriented, Intact   Associations Intact   Thought Content Patient is currently positive for suicidal ideation, negative for plan or intent, able to contract no self harm and identify barriers to suicide.  Negative for obsessions, compulsions or psychosis.     Fund of Knowledge Intact   Insight Poor   Judgement Poor   Attention Span & Concentration Intact   Recent & Remote Memory Intact    Gait Normal   Muscle Tone Intact        Labs     Labs reviewed.  No results found for this or any previous visit (from the past 24 hour(s)).     Impression   This is a 33 year old female with previous psychiatric diagnoses that include major depression, generalized anxiety, and traits of borderline personality disorder. She obtains four previous inpatient mental health hospitalizations, her most recent being less than 1 month ago here at [facility]. Paperwork for petition for commitment was filed yesterday morning by Dr. Madrigal. Our  notified [facility] Prepetition Screening yesterday afternoon of [place] petition for  commitment and [facility]. An individual from Prepetition Screening will be at the hospital sometime today to meet with patient.      Diagnoses     1. Major depression, recurrent, severe, without psychotic features.  2. Generalized Anxiety Disorder  3. Traits of Borderline Personality Disorder     Plan     1. Explained side effects, benefits, and complications of medications to the patient, Pt gave verbal consent.  2. Medication changes: Increase lamictal 50  3. Discussed treatment plan with patient and team.  4. Projected length of stay: 7+ days   5. Committed and Jarvised    Attestation:   Reynaldo LEBLANC MD staffed this patient today    Patient ID:  Name: Misty Parham    MRN: [id number]  Admission: 11/25/2019   YOB: 1985

## 2019-12-04 NOTE — PLAN OF CARE
Problem: Depressive Symptoms  Goal: Depressive Symptoms  Description  Signs and symptoms of listed problems will be absent or manageable.  Outcome: No Change  Flowsheets (Taken 12/4/2019 1244)  Depressive Symptoms Assessed: all  Depressive Symptoms Present: affect; mood; insight; other (see comment)  Note:   Pt presents with a flat affect and depressed mood. Pt neglects her hygiene and spent the majority of the day resting in bed despite prompts. Her insight is poor and judgement is impaired. Pt exhibits poor concentration and engages minimally in conversation. She endorses passive SI and is med-compliant.

## 2019-12-04 NOTE — PROGRESS NOTES
met with pt yesterday.    She was in her room, in bed.   Appears depressed.   Very minimal verbal responses.    When I ask if she can be safe at home, she does not reply.   She is not happy about the petition for commitment being filed.

## 2019-12-05 PROCEDURE — 12400000 ZZH R&B MH

## 2019-12-05 PROCEDURE — 25000132 ZZH RX MED GY IP 250 OP 250 PS 637: Performed by: PSYCHIATRY & NEUROLOGY

## 2019-12-05 RX ADMIN — BREXPIPRAZOLE 3 MG: 2 TABLET ORAL at 08:33

## 2019-12-05 RX ADMIN — LAMOTRIGINE 50 MG: 25 TABLET ORAL at 08:33

## 2019-12-05 RX ADMIN — VORTIOXETINE 20 MG: 20 TABLET, FILM COATED ORAL at 08:33

## 2019-12-05 RX ADMIN — MIRTAZAPINE 45 MG: 45 TABLET, FILM COATED ORAL at 21:12

## 2019-12-05 ASSESSMENT — ACTIVITIES OF DAILY LIVING (ADL)
LAUNDRY: UNABLE TO COMPLETE
HYGIENE/GROOMING: INDEPENDENT
DRESS: INDEPENDENT
HYGIENE/GROOMING: INDEPENDENT
DRESS: SCRUBS (BEHAVIORAL HEALTH)
ORAL_HYGIENE: INDEPENDENT
ORAL_HYGIENE: INDEPENDENT

## 2019-12-05 NOTE — PROGRESS NOTES
[facility] Psychiatric Progress Note       Interim History     The patient was seen on SDU.  The preceding clinical notes were reviewed and the case was discussed with her treatment team.  Patient is being seen for Dr. Madrigal who is currently on vacation.  Patient tells me she has a longstanding history of major depressive disorder and has had multiple medication trials and series of electroconvulsive therapy.  She tells me that medications do not help her but she does well for maybe a couple weeks after she completes a series of ECT treatments.  However she states that she does get memory lapses after ECT on account of which she reportedly has been refusing any further ECT sessions.  Dr. Madrigal had petition for her commitment because she has repeatedly stated that she is going to end her life once she is discharged from the hospital.  On direct interview today, patient denies that she has ever attempted suicide but confirms that she does not have anything to be for.  She tells me that she resides with her mother and stepfather whom she describes as alcoholics.  She states that she does not like her living situation.  She states her mother is responsible for her stepdaughter and son while her sister has her other child.  She states she and her  have been  for the last 5 years.  She tells me she is aware of the petition for commitment as the [facility] prepetition screener has come to meet with her.  She does not report any intermediate plans to harm herself or others but insist that upon discharge she is going to kill herself.  She does not articulate any specific plans.     Hospital Course   This is a 33 year old female with previous psychiatric diagnoses that include major depression, generalized anxiety, and traits of borderline personality disorder. She obtains four previous inpatient mental health hospitalizations, her most recent being less than 1 month ago here at [facility].  For current admission, the patient presented to [facility] from her outpatient psychiatric clinic due to suicidal ideation. The patient has experienced increased symptoms of depression and suicidal ideation within the past few weeks. Contemplated suicide by overdosing on her Remeron medication. It should be noted that the patient has stopped taking her psychiatric medications altogether. She was deemed appropriate for inpatient level of care for safety and further stabilization. While meeting with Dr. Madrigal this morning, petition for commitment and [facility] were explained to patient. Dr. Madrigal believes this approach is necessary due to the patient being noncompliant with medications outside of the hospital setting, having numerous hospitalizations within the past year, and sabotaging aftercare treatment. Paperwork for commitment will be filled out by Dr. Madrigal.      Medications     Current Facility-Administered Medications Ordered in Epic   Medication Dose Route Frequency Last Rate Last Dose     brexpiprazole (REXULTI) tablet 3 mg  3 mg Oral Daily   3 mg at 12/05/19 0833     hydrOXYzine (ATARAX) tablet 25 mg  25 mg Oral TID PRN         lamoTRIgine (LaMICtal) tablet 50 mg  50 mg Oral Daily   50 mg at 12/05/19 0833     mirtazapine (REMERON) tablet 45 mg  45 mg Oral At Bedtime   45 mg at 12/04/19 2037     vortioxetine (TRINTELLIX/BRINTELLIX) tablet 20 mg  20 mg Oral Daily   20 mg at 12/05/19 0833     No current [facility]-ordered outpatient medications on file.         Allergies      No Known Allergies     Medical Review of Systems     /78   Pulse 85   Temp 97.7  F (36.5  C) (Oral)   Resp 17   Wt 103.6 kg (228 lb 6.4 oz)   SpO2 98%   BMI 38.01 kg/m    Body mass index is 38.01 kg/m .  A 10-point review of systems was performed by Zhang Madrigal MD and is negative, no new findings.      Psychiatric Examination     Appearance Sitting in chair, dressed in hospital scrubs. Appears stated age.   Attitude  Cooperative   Orientation Oriented to person, place, time   Eye Contact Poor   Speech Regular rate, rhythm, volume and tone   Language Normal   Psychomotor Behavior Normal   Mood Depressed   Affect Flat, quiet   Thought Process Goal-Oriented, Intact   Associations Intact   Thought Content Patient is currently positive for suicidal ideation, negative for plan or intent, able to contract no self harm and identify barriers to suicide.  Negative for obsessions, compulsions or psychosis.     Fund of Knowledge Intact   Insight Poor   Judgement Poor   Attention Span & Concentration Intact   Recent & Remote Memory Intact    Gait Normal   Muscle Tone Intact        Labs     Labs reviewed.  No results found for this or any previous visit (from the past 24 hour(s)).     Impression   This is a 33 year old female with previous psychiatric diagnoses that include major depression, generalized anxiety, and traits of borderline personality disorder. She obtains four previous inpatient mental health hospitalizations, her most recent being less than 1 month ago here at [facility]. Paperwork for petition for commitment was filed yesterday morning by Dr. Madrigal. Our  notified [facility] Prepetition Screening yesterday afternoon of [place] petition for commitment and [facility]. An individual from Prepetition Screening will be at the hospital sometime today to meet with patient.   12/5/2019: Patient continues to report a plan to terminate her life upon discharge from the hospital.  Case was discussed with her treatment team and they recommended placing her on a 72-hour hold which was initiated today at noon.   Diagnoses     1. Major depression, recurrent, severe, without psychotic features.  2. Generalized Anxiety Disorder  3. Traits of Borderline Personality Disorder     Plan     1. Explained side effects, benefits, and complications of medications to the patient, Pt gave verbal consent.  2. Medication changes: None.  3. Discussed  treatment plan with patient and team.  4. Projected length of stay: 7+ days   5.   Legal status: 72-hour hold.  Attestation:   Radha LEBLANC MD staffed this patient today    Patient ID:  Name: Misty Parham    MRN: [id number]  Admission: 11/25/2019   YOB: 1985

## 2019-12-05 NOTE — PLAN OF CARE
Pt is A&O, presents as hopeless and helpless , mood is sad and depressed, with flat affect. Speech is quiet and slow. Pt s thought process is impaired. Behavior is withdrawn and isolative. Continues to  lack insight into her situation. Displays poor judgment. Pt  continues to endorse suicidal ideation. Contracts for safety. She was encouraged to be more visible on the unit and participate in unit programing. She showered with some prompting and changed her bed linen. Plan is for petition for commitment and [facility]. Pt is not happy about it.

## 2019-12-05 NOTE — PROGRESS NOTES
Today around 12 noon, I faxed to the Co Atty's office:  -72 hour hold  -Durant petition which now includes a diagnosis  - The declaration statement on the Exhibit A.    Since the Co Atty's office now has all the necessary documentation, their office will process the case and then send it on to [facility] [address] for filing.

## 2019-12-05 NOTE — PLAN OF CARE
Problem: Depressive Symptoms  Goal: Depressive Symptoms  Description  Signs and symptoms of listed problems will be absent or manageable.  Flowsheets (Taken 12/5/2019 2778)  Depressive Symptoms Assessed: all  Depressive Symptoms Present: insight; mood; suicidality  Note:   Patient's vitals were WDL. Patient had no medication changes and took no PRNs. Patient was medication compliant. Patient was refused to attend any groups and was isolative and withdrawn. Patient came out of her room only for meals. Patient refuses all prompting by staff to get her out of her room. Patient would either sleep or read in her room. Patient states that she just wants to go to sleep for ever.

## 2019-12-06 PROCEDURE — 12400000 ZZH R&B MH

## 2019-12-06 PROCEDURE — 25000132 ZZH RX MED GY IP 250 OP 250 PS 637: Performed by: PSYCHIATRY & NEUROLOGY

## 2019-12-06 RX ORDER — LAMOTRIGINE 100 MG/1
100 TABLET ORAL DAILY
Status: DISCONTINUED | OUTPATIENT
Start: 2019-12-06 | End: 2019-12-11

## 2019-12-06 RX ADMIN — LAMOTRIGINE 100 MG: 100 TABLET ORAL at 08:52

## 2019-12-06 RX ADMIN — VORTIOXETINE 20 MG: 20 TABLET, FILM COATED ORAL at 08:53

## 2019-12-06 RX ADMIN — MIRTAZAPINE 45 MG: 45 TABLET, FILM COATED ORAL at 20:38

## 2019-12-06 RX ADMIN — BREXPIPRAZOLE 3 MG: 2 TABLET ORAL at 08:52

## 2019-12-06 ASSESSMENT — ACTIVITIES OF DAILY LIVING (ADL)
ORAL_HYGIENE: INDEPENDENT
LAUNDRY: WITH SUPERVISION
HYGIENE/GROOMING: INDEPENDENT
DRESS: INDEPENDENT

## 2019-12-06 NOTE — PROGRESS NOTES
[facility] Psychiatric Progress Note       Interim History     The patient was seen on SDU.  Significant psychomotor retardation.  Isolating in her room.  Macey and son visited last pm.  Sleeping a lot.  Place on 72 hold on 12/5/19.  Commitment and Durant is being processed.  Admits to being suicidal. Wants to right eye.  Doesn't want ECT because of memory loss.  Has tried several antidepressants.  Maybe good candidate for ketamine or spravato.     Hospital Course   This is a 33 year old female with previous psychiatric diagnoses that include major depression, generalized anxiety, and traits of borderline personality disorder. She obtains four previous inpatient mental health hospitalizations, her most recent being less than 1 month ago here at [facility]. For current admission, the patient presented to [facility] from her outpatient psychiatric clinic due to suicidal ideation. The patient has experienced increased symptoms of depression and suicidal ideation within the past few weeks. Contemplated suicide by overdosing on her Remeron medication. It should be noted that the patient has stopped taking her psychiatric medications altogether. She was deemed appropriate for inpatient level of care for safety and further stabilization. While meeting with Dr. Madrigal this morning, petition for commitment and [facility] were explained to patient. Dr. Madrigal believes this approach is necessary due to the patient being noncompliant with medications outside of the hospital setting, having numerous hospitalizations within the past year, and sabotaging aftercare treatment. Paperwork for commitment will be filled out by Dr. Madrigal.      Medications     Current Facility-Administered Medications Ordered in Epic   Medication Dose Route Frequency Last Rate Last Dose     brexpiprazole (REXULTI) tablet 3 mg  3 mg Oral Daily   3 mg at 12/05/19 0833     hydrOXYzine (ATARAX) tablet 25 mg  25 mg Oral TID PRN          lamoTRIgine (LaMICtal) tablet 50 mg  50 mg Oral Daily   50 mg at 12/05/19 0833     mirtazapine (REMERON) tablet 45 mg  45 mg Oral At Bedtime   45 mg at 12/05/19 2112     vortioxetine (TRINTELLIX/BRINTELLIX) tablet 20 mg  20 mg Oral Daily   20 mg at 12/05/19 0833     No current Epic-ordered outpatient medications on file.         Allergies      No Known Allergies     Medical Review of Systems     /81   Pulse 85   Temp 98.1  F (36.7  C) (Oral)   Resp 15   Wt 103.6 kg (228 lb 6.4 oz)   SpO2 98%   BMI 38.01 kg/m    Body mass index is 38.01 kg/m .  A 10-point review of systems was performed by Zhang Madrigal MD and is negative, no new findings.      Psychiatric Examination     Appearance Sitting in chair, dressed in hospital scrubs. Appears stated age.   Attitude Cooperative   Orientation Oriented to person, place, time   Eye Contact Poor   Speech Regular rate, rhythm, volume and tone   Language Normal   Psychomotor Behavior Normal   Mood Depressed   Affect Flat, quiet   Thought Process Goal-Oriented, Intact   Associations Intact   Thought Content Patient is currently positive for suicidal ideation, negative for plan or intent, able to contract no self harm and identify barriers to suicide.  Negative for obsessions, compulsions or psychosis.     Fund of Knowledge Intact   Insight Poor   Judgement Poor   Attention Span & Concentration Intact   Recent & Remote Memory Intact    Gait Normal   Muscle Tone Intact        Labs     Labs reviewed.  No results found for this or any previous visit (from the past 24 hour(s)).     Impression   This is a 33 year old female with previous psychiatric diagnoses that include major depression, generalized anxiety, and traits of borderline personality disorder. She obtains four previous inpatient mental health hospitalizations, her most recent being less than 1 month ago here at [facility]. Paperwork for petition for commitment was filed yesterday morning by Dr. Madrigal. Our   [facility] yesterday afternoon of [facility] for commitment and [facility]. An individual from Prepetition Screening will be at the hospital sometime today to meet with patient.   12/5/2019: Patient continues to report a plan to terminate her life upon discharge from the hospital.  Case was discussed with her treatment team and they recommended placing her on a 72-hour hold which was initiated today at noon.   Diagnoses     1. Major depression, recurrent, severe, without psychotic features.  2. Generalized Anxiety Disorder  3. Traits of Borderline Personality Disorder     Plan     1. Explained side effects, benefits, and complications of medications to the patient, Pt gave verbal consent.  2. Medication changes: Increase lamictal 100 mg  3. Discussed treatment plan with patient and team.  4. Projected length of stay: 7+ days   5.   Legal status: 72-hour hold.  Attestation:   Reynaldo LEBLANC MD staffed this patient today    Patient ID:  Name: Misty Parham    MRN: [id number]  Admission: 11/25/2019   YOB: 1985

## 2019-12-06 NOTE — PLAN OF CARE
Problem: Depressive Symptoms  Goal: Depressive Symptoms  Description  Signs and symptoms of listed problems will be absent or manageable.  Flowsheets (Taken 12/6/2019 6272)  Depressive Symptoms Assessed: all  Depressive Symptoms Present: insight; mood; suicidality; affect  Note:   Pt presents with a flat affect and depressed mood. Pt's insight is poor and judgement is impaired. She remains isolative, and spent the duration of the shift in her room besides meals. Pt continues to feel sad and has not experienced any changes in mood - she states that reading has recently been helping. Pt also complains of intermittent sleeping patterns the past few nights. She endorses chronic but fleeting SI, and is med-compliant.

## 2019-12-06 NOTE — PLAN OF CARE
Pt is A&O, presents as sad and depressed, with flat affect. She is bright upon approach. Expressed frustration at the fact that many people in her life do not understand how to deal with her depression. Continues to be isolative to her room and withdrawn. Declined invitations to attend unit programing. Pt endorses suicidal ideation. Contracts for safety. Pt said she is  angry  at the prospect of being committed. Her  and son visited her evening. She sent her car keys home with them.

## 2019-12-07 PROCEDURE — 25000132 ZZH RX MED GY IP 250 OP 250 PS 637: Performed by: PSYCHIATRY & NEUROLOGY

## 2019-12-07 PROCEDURE — 12400000 ZZH R&B MH

## 2019-12-07 RX ADMIN — VORTIOXETINE 20 MG: 20 TABLET, FILM COATED ORAL at 09:00

## 2019-12-07 RX ADMIN — BREXPIPRAZOLE 3 MG: 2 TABLET ORAL at 09:00

## 2019-12-07 RX ADMIN — MIRTAZAPINE 45 MG: 45 TABLET, FILM COATED ORAL at 20:33

## 2019-12-07 RX ADMIN — LAMOTRIGINE 100 MG: 100 TABLET ORAL at 09:00

## 2019-12-07 ASSESSMENT — ACTIVITIES OF DAILY LIVING (ADL)
LAUNDRY: WITH SUPERVISION
HYGIENE/GROOMING: INDEPENDENT
DRESS: INDEPENDENT
ORAL_HYGIENE: INDEPENDENT

## 2019-12-07 NOTE — PLAN OF CARE
"Pt spent much of the shift reading in her room. She did eat [place] among [place] [facility] She presents with a restricted affect and depressed mood. Insight and judgement continue to seem impaired. When asked what her goal for this coming week is, she stated to \"read\" and spoke briefly about her novel. She was encouraged to continue to make her needs known to staff.   "
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
## 2019-12-08 PROCEDURE — 25000132 ZZH RX MED GY IP 250 OP 250 PS 637: Performed by: PSYCHIATRY & NEUROLOGY

## 2019-12-08 PROCEDURE — 12400000 ZZH R&B MH

## 2019-12-08 RX ADMIN — BREXPIPRAZOLE 3 MG: 2 TABLET ORAL at 09:07

## 2019-12-08 RX ADMIN — MIRTAZAPINE 45 MG: 45 TABLET, FILM COATED ORAL at 20:53

## 2019-12-08 RX ADMIN — VORTIOXETINE 20 MG: 20 TABLET, FILM COATED ORAL at 09:07

## 2019-12-08 RX ADMIN — LAMOTRIGINE 100 MG: 100 TABLET ORAL at 09:07

## 2019-12-08 ASSESSMENT — ACTIVITIES OF DAILY LIVING (ADL)
HYGIENE/GROOMING: INDEPENDENT
DRESS: INDEPENDENT
ORAL_HYGIENE: INDEPENDENT
LAUNDRY: WITH SUPERVISION

## 2019-12-08 NOTE — PLAN OF CARE
"Flat, sad affect, reports that her depression is a 7 out of 10 but that is better then the 10 out of 10 that she had been feeling before she got here. States that she does not have much hope about the future. Isolative to room, except for brief period where she sat out in lounge and read. Endorses self harm thoughts \"off and on\", does contract for safety. Showered. Med compliant  "
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
## 2019-12-08 NOTE — PLAN OF CARE
Pt spent most of the shift in her room. She was up briefly in the morning, and ambulated with her walker, c/o pain. She continues to appear depressed and withdrawn. Declined all groups and 1:1.

## 2019-12-09 PROCEDURE — 25000132 ZZH RX MED GY IP 250 OP 250 PS 637: Performed by: PSYCHIATRY & NEUROLOGY

## 2019-12-09 PROCEDURE — 12400000 ZZH R&B MH

## 2019-12-09 RX ADMIN — VORTIOXETINE 20 MG: 20 TABLET, FILM COATED ORAL at 08:36

## 2019-12-09 RX ADMIN — BREXPIPRAZOLE 3 MG: 2 TABLET ORAL at 08:36

## 2019-12-09 RX ADMIN — LAMOTRIGINE 100 MG: 100 TABLET ORAL at 08:36

## 2019-12-09 RX ADMIN — MIRTAZAPINE 45 MG: 45 TABLET, FILM COATED ORAL at 22:02

## 2019-12-09 ASSESSMENT — ACTIVITIES OF DAILY LIVING (ADL)
HYGIENE/GROOMING: INDEPENDENT
LAUNDRY: WITH SUPERVISION
ORAL_HYGIENE: INDEPENDENT
DRESS: INDEPENDENT

## 2019-12-09 NOTE — PLAN OF CARE
"Pleasant.  Isolative and withdrawn.   Spent time bed resting and reading. Declined groups. Complained of nausea.  States she is \"so-so.\"  Endorses intermittent SI with a plan to overdose.  Verbally contracts for safety while on the unit.  Med complaint. Anxious, flat, depressed.  Continue treatment plan.  "

## 2019-12-09 NOTE — PLAN OF CARE
BEHAVIORAL TEAM DISCUSSION    Participants: MD, CM, PA, RN  Progress: sad, flat, depressed, isolative, withdrawn  Anticipated length of stay: unknown  Continued Stay Criteria/Rationale: pursuing [place] petition for commitment, medication management  Medical/Physical: NA  Precautions:   Behavioral Orders   Procedures    Code 1 - Restrict to Unit    Discontinue 1:1 attendant for suicide risk     Order Specific Question:   I have performed an in person assessment of the patient     Answer:   Based on this assessment the patient no longer requires a one on one attendant at this point in time.    Routine Programming     As clinically indicated    Status 15     Every 15 minutes.     Plan: Pursue petition for commitment, encourage groups, medication management  Rationale for change in precautions or plan: NA

## 2019-12-09 NOTE — PROGRESS NOTES
[facility] Psychiatric Progress Note       Interim History     The patient's care was discussed with the treatment team and chart notes were reviewed. Pt seen on 12/09/19 by Dr. Madrigal. Patient declares she is doing alright this morning. She denies having any issues with her current medication regimen. She specifically denies experiencing side effects from the increase made in her Lamictal medication on 12/06/19 by Dr. Kwon. Dr. Madrigal will make no medication adjustments today, continue medications as is. It should be noted that [facility] Prepetitioner Screening has supported commitment and [place] petition. As of now, there is no court date placed.      Hospital Course   This is a 33 year old female with previous psychiatric diagnoses that include major depression, generalized anxiety, and traits of borderline personality disorder. She obtains four previous inpatient mental health hospitalizations, her most recent being less than 1 month ago here at [facility]. For current admission, the patient presented to [facility] from her outpatient psychiatric clinic due to suicidal ideation. The patient has experienced increased symptoms of depression and suicidal ideation within the past few weeks. Contemplated suicide by overdosing on her Remeron medication. It should be noted that the patient has stopped taking her psychiatric medications altogether. She was deemed appropriate for inpatient level of care for safety and further stabilization. While meeting with Dr. Madrigal this morning, petition for commitment and [place] were explained to patient. Dr. Madrigal believes this approach is necessary due to the patient being noncompliant with medications outside of the hospital setting, having numerous hospitalizations within the past year, and sabotaging aftercare treatment. Paperwork for commitment will be filled out by Dr. Madrigal.      Medications     Current Facility-Administered Medications  Ordered in Epic   Medication Dose Route Frequency Last Rate Last Dose     brexpiprazole (REXULTI) tablet 3 mg  3 mg Oral Daily   3 mg at 12/08/19 0907     hydrOXYzine (ATARAX) tablet 25 mg  25 mg Oral TID PRN         lamoTRIgine (LaMICtal) tablet 100 mg  100 mg Oral Daily   100 mg at 12/08/19 0907     mirtazapine (REMERON) tablet 45 mg  45 mg Oral At Bedtime   45 mg at 12/08/19 2053     vortioxetine (TRINTELLIX/BRINTELLIX) tablet 20 mg  20 mg Oral Daily   20 mg at 12/08/19 0907     No current [facility]-ordered outpatient medications on file.         Allergies      No Known Allergies     Medical Review of Systems     /73   Pulse 83   Temp 98.2  F (36.8  C) (Oral)   Resp 16   Wt 103.6 kg (228 lb 6.4 oz)   SpO2 98%   BMI 38.01 kg/m    Body mass index is 38.01 kg/m .  A 10-point review of systems was performed by Zhang Madrigal MD and is negative, no new findings.      Psychiatric Examination     Appearance Sitting in chair, dressed in hospital scrubs. Appears stated age.   Attitude Cooperative   Orientation Oriented to person, place, time   Eye Contact Poor   Speech Regular rate, rhythm, volume and tone   Language Normal   Psychomotor Behavior Normal   Mood Depressed   Affect Flat, quiet   Thought Process Goal-Oriented, Intact   Associations Intact   Thought Content Patient is currently positive for suicidal ideation, negative for plan or intent, able to contract no self harm and identify barriers to suicide.  Negative for obsessions, compulsions or psychosis.     Fund of Knowledge Intact   Insight Poor   Judgement Poor   Attention Span & Concentration Intact   Recent & Remote Memory Intact    Gait Normal   Muscle Tone Intact        Labs     Labs reviewed.  No results found for this or any previous visit (from the past 24 hour(s)).     Impression   This is a 33 year old female with previous psychiatric diagnoses that include major depression, generalized anxiety, and traits of borderline personality  disorder. She obtains four previous inpatient mental health hospitalizations, her most recent being less than 1 month ago here at [facility]. The patient was again cooperative and quiet throughout interview with Dr Madrigal this morning. She denied experiencing any issues or side effects from her current medications, specifically with the increased dose of Lamictal. Dr. Miller made no medication adjustments today, will leave doses as is. In regards of commitment and [place], the prepetitioner screenings have supported commitment, thus the patients files have been sent to [facility] for filing.      Diagnoses     1. Major depression, recurrent, severe, without psychotic features.  2. Generalized Anxiety Disorder  3. Traits of Borderline Personality Disorder     Plan     1. Explained side effects, benefits, and complications of medications to the patient, Pt gave verbal consent.  2. Medication changes: None.   3. Discussed treatment plan with patient and team.  4. Projected length of stay: 7+ days       Attestation:   Alessandra LEBLANC, am serving as a scribe on 12/09/2019 to document services personally performed by Zhang Madrigal MD based on my observations and the provider's statements to me.    Patient ID:  Name: Misty Parham    MRN: [id number]  Admission: 11/25/2019   YOB: 1985

## 2019-12-09 NOTE — PLAN OF CARE
Flat, sad affect, rates her depression a 7 out of 10. Isloative and withdrawn. Endorses fleeting thoughts of self harm but contracts for safety while on unit. Pleasant when she does interact with others. Encouraged her to participate in unit activities. Med compliant

## 2019-12-10 PROCEDURE — 25000132 ZZH RX MED GY IP 250 OP 250 PS 637: Performed by: PSYCHIATRY & NEUROLOGY

## 2019-12-10 PROCEDURE — 12400000 ZZH R&B MH

## 2019-12-10 RX ADMIN — LAMOTRIGINE 100 MG: 100 TABLET ORAL at 09:20

## 2019-12-10 RX ADMIN — VORTIOXETINE 20 MG: 20 TABLET, FILM COATED ORAL at 09:21

## 2019-12-10 RX ADMIN — BREXPIPRAZOLE 3 MG: 2 TABLET ORAL at 09:21

## 2019-12-10 RX ADMIN — MIRTAZAPINE 45 MG: 45 TABLET, FILM COATED ORAL at 21:28

## 2019-12-10 NOTE — PLAN OF CARE
Pt has been isolative and withdrawn. Reading a book in bed. Up for med's with motivation from staff. Pt denied SI, pt said I'm not suicidal right now. Pt agreed to no self harm contract and said she would come and talk with staff if SI returns. Pt was up for meals, refused groups. Shower encouraged and as of yet still needed. Affect is blunt, pt is soft spoken to the point of whispering, slow in speech. Poor eye contact. Mood Sad, depressed, calm.

## 2019-12-10 NOTE — PROGRESS NOTES
Pt is on [facility] [address] hold, per Carly, as of today at 11:28am.

## 2019-12-10 NOTE — PROGRESS NOTES
"Patient spent most of the shift in her room bed resting and reading. States she is \"OK\" contracts for safety on the unit. Mood is depressed. Affect is flat and blunt  "

## 2019-12-10 NOTE — PROGRESS NOTES
[facility] Psychiatric Progress Note       Interim History     The patient's care was discussed with the treatment team and chart notes were reviewed. Pt seen on 12/10/19 by Dr. Madrigal. Patient declares she is doing fine this morning. Staff members continue to note that the patient is quite isolative, withdrawn, and having thoughts of suicide. Dr. Madrigal again explains why ECT would be the patients best option at this point. Patient continues to be against this idea. We continue to wait to hear back from the courts in regards of the patients commitment. Aside from this Dr. Madrigal attempts to discuss patients plans and issues outside of the hospital setting. Again, the patient has been living with her alcoholic mother and step-father and continues to be associated with her  who she has been  with for the past 5 years. She has poor insight in regards of all of these relationships.      Hospital Course   This is a 33 year old female with previous psychiatric diagnoses that include major depression, generalized anxiety, and traits of borderline personality disorder. She obtains four previous inpatient mental health hospitalizations, her most recent being less than 1 month ago here at [facility]. For current admission, the patient presented to [facility] from her outpatient psychiatric clinic due to suicidal ideation. The patient has experienced increased symptoms of depression and suicidal ideation within the past few weeks. Contemplated suicide by overdosing on her Remeron medication. It should be noted that the patient has stopped taking her psychiatric medications altogether. She was deemed appropriate for inpatient level of care for safety and further stabilization. While meeting with Dr. Madrigal this morning, petition for commitment and [place] were explained to patient. Dr. Madrigal believes this approach is necessary due to the patient being noncompliant with medications  outside of the hospital setting, having numerous hospitalizations within the past year, and sabotaging aftercare treatment. Paperwork for commitment will be filled out by Dr. Madrigal.      Medications     Current Facility-Administered Medications Ordered in Epic   Medication Dose Route Frequency Last Rate Last Dose     brexpiprazole (REXULTI) tablet 3 mg  3 mg Oral Daily   3 mg at 12/09/19 0836     hydrOXYzine (ATARAX) tablet 25 mg  25 mg Oral TID PRN         lamoTRIgine (LaMICtal) tablet 100 mg  100 mg Oral Daily   100 mg at 12/09/19 0836     mirtazapine (REMERON) tablet 45 mg  45 mg Oral At Bedtime   45 mg at 12/09/19 2202     vortioxetine (TRINTELLIX/BRINTELLIX) tablet 20 mg  20 mg Oral Daily   20 mg at 12/09/19 0836     No current [facility]-ordered outpatient medications on file.         Allergies      No Known Allergies     Medical Review of Systems     /76   Pulse 95   Temp 98.2  F (36.8  C) (Oral)   Resp 16   Wt 103.6 kg (228 lb 6.4 oz)   SpO2 96%   BMI 38.01 kg/m    Body mass index is 38.01 kg/m .  A 10-point review of systems was performed by Zhang Madrigal MD and is negative, no new findings.      Psychiatric Examination     Appearance Sitting in chair, dressed in hospital scrubs. Appears stated age.   Attitude Cooperative   Orientation Oriented to person, place, time   Eye Contact Poor   Speech Regular rate, rhythm, volume and tone   Language Normal   Psychomotor Behavior Normal   Mood Depressed   Affect Flat, quiet   Thought Process Goal-Oriented, Intact   Associations Intact   Thought Content Patient is currently positive for suicidal ideation, negative for plan or intent, able to contract no self harm and identify barriers to suicide.  Negative for obsessions, compulsions or psychosis.     Fund of Knowledge Intact   Insight Poor   Judgement Poor   Attention Span & Concentration Intact   Recent & Remote Memory Intact    Gait Normal   Muscle Tone Intact        Labs     Labs reviewed.  No  results found for this or any previous visit (from the past 24 hour(s)).     Impression   This is a 33 year old female with previous psychiatric diagnoses that include major depression, generalized anxiety, and traits of borderline personality disorder. She obtains four previous inpatient mental health hospitalizations, her most recent being less than 1 month ago here at [facility]. During today's meeting with Dr. Madrigal, the patient continued to demonstrate poor insight, poor judgment, and depressed in mood. Dr. Madrigal again presented the idea of patient undergoing ECT while inpatient, this being the best treatment option for the patient at this time. Patient again denied this form of psychiatric treatment. We have yet to hear back from the courts in regards of patients commitment status. Will wait to hear back from the courts before considering different treatment options.      Diagnoses     1. Major depression, recurrent, severe, without psychotic features.  2. Generalized Anxiety Disorder  3. Traits of Borderline Personality Disorder     Plan     1. Explained side effects, benefits, and complications of medications to the patient, Pt gave verbal consent.  2. Medication changes: None.   3. Discussed treatment plan with patient and team.  4. Projected length of stay: 7+ days       Attestation:   Alessandra LEBLANC, am serving as a scribe on 12/10/2019 to document services personally performed by Zhang Madrigal MD based on my observations and the provider's statements to me.    Patient ID:  Name: Misty Parham    MRN: [id number]  Admission: 11/25/2019   YOB: 1985

## 2019-12-11 PROCEDURE — 12400000 ZZH R&B MH

## 2019-12-11 PROCEDURE — 25000132 ZZH RX MED GY IP 250 OP 250 PS 637: Performed by: PSYCHIATRY & NEUROLOGY

## 2019-12-11 RX ORDER — LAMOTRIGINE 100 MG/1
200 TABLET ORAL DAILY
Status: DISCONTINUED | OUTPATIENT
Start: 2019-12-11 | End: 2020-01-16 | Stop reason: HOSPADM

## 2019-12-11 RX ADMIN — MIRTAZAPINE 45 MG: 45 TABLET, FILM COATED ORAL at 20:42

## 2019-12-11 RX ADMIN — VORTIOXETINE 20 MG: 20 TABLET, FILM COATED ORAL at 09:52

## 2019-12-11 RX ADMIN — BREXPIPRAZOLE 3 MG: 2 TABLET ORAL at 09:52

## 2019-12-11 RX ADMIN — LAMOTRIGINE 200 MG: 100 TABLET ORAL at 09:52

## 2019-12-11 NOTE — PLAN OF CARE
Shift Update: Pt mood is flat, affect is sad. Thought process is impaired. Insight is poor. Pt admits to having suicidal ideation, which comes and goes (with no plans). Pt spent the entire shift in her room and did not eat her dinner. Pt is hopeless and unmotivated.

## 2019-12-11 NOTE — PLAN OF CARE
"Pt attended a morning group. Up for meals, Pt was reminded to come to the Medication window for morning med's. When pt did come for medications pt said,\"I don't come because I don't want my med's.\"  Pt was told that she has the right to refuse her medications and pt was asked if she was refusing her med's. Pt said, \"No.\"  I'm not refusing, I just don't want to take them.\" Pt took her morning med's late. Pt was told that the doctor and staff would like her to be more visible on the unit. Pt was told that if she continues to lay in bed all day the doctor said there would be an order placed to lock pt out of her room for a portion of the day. Pt went down to the lounge until the Court rep arrived to serve pt papers, pt has been in her room since that time reviewing her papers. Affect Flat, Mood Depressed,   "

## 2019-12-11 NOTE — PROGRESS NOTES
[facility] Psychiatric Progress Note       Interim History     The patient's care was discussed with the treatment team and chart notes were reviewed. Pt seen on 12/11/19 by Dr. Madrigal. Patient declares she is doing alright this morning. When asked if the patient believes her current medications are doing anything for her, the patient reports they have not been effective. From this, Dr. Madrigal would like to increase the patients Lamictal dose from 100mg to 200mg today. Patient ok with this. Dr. Madrigal again tries to encourage the patient to consider doing just one ECT session on Friday (12/13/19), however the patient again refuses. We continue to wait to hear back from the courts in regards of patients commitment. Patient on [facility] [address] hold as of yesterday afternoon.      Hospital Course   This is a 33 year old female with previous psychiatric diagnoses that include major depression, generalized anxiety, and traits of borderline personality disorder. She obtains four previous inpatient mental health hospitalizations, her most recent being less than 1 month ago here at [facility]. For current admission, the patient presented to [facility] from her outpatient psychiatric clinic due to suicidal ideation. The patient has experienced increased symptoms of depression and suicidal ideation within the past few weeks. Contemplated suicide by overdosing on her Remeron medication. It should be noted that the patient has stopped taking her psychiatric medications altogether. She was deemed appropriate for inpatient level of care for safety and further stabilization. While meeting with Dr. Madrigal this morning, petition for commitment and [place] were explained to patient. Dr. Madrigal believes this approach is necessary due to the patient being noncompliant with medications outside of the hospital setting, having numerous hospitalizations within the past year, and sabotaging aftercare  treatment. Paperwork for commitment will be filled out by Dr. Madrigal.      Medications     Current Facility-Administered Medications Ordered in Epic   Medication Dose Route Frequency Last Rate Last Dose     brexpiprazole (REXULTI) tablet 3 mg  3 mg Oral Daily   3 mg at 12/10/19 0921     hydrOXYzine (ATARAX) tablet 25 mg  25 mg Oral TID PRN         lamoTRIgine (LaMICtal) tablet 100 mg  100 mg Oral Daily   100 mg at 12/10/19 0920     mirtazapine (REMERON) tablet 45 mg  45 mg Oral At Bedtime   45 mg at 12/10/19 2128     vortioxetine (TRINTELLIX/BRINTELLIX) tablet 20 mg  20 mg Oral Daily   20 mg at 12/10/19 0921     No current [facility]-ordered outpatient medications on file.         Allergies      No Known Allergies     Medical Review of Systems     /76   Pulse 77   Temp 97.9  F (36.6  C) (Oral)   Resp 16   Wt 103.1 kg (227 lb 4.8 oz)   SpO2 99%   BMI 37.82 kg/m    Body mass index is 37.82 kg/m .  A 10-point review of systems was performed by Zhang Madrigal MD and is negative, no new findings.      Psychiatric Examination     Appearance Sitting in chair, dressed in hospital scrubs. Appears stated age.   Attitude Cooperative   Orientation Oriented to person, place, time   Eye Contact Poor   Speech Regular rate, rhythm, volume and tone   Language Normal   Psychomotor Behavior Normal   Mood Depressed   Affect Flat, quiet   Thought Process Goal-Oriented, Intact   Associations Intact   Thought Content Patient is currently positive for suicidal ideation, negative for plan or intent, able to contract no self harm and identify barriers to suicide.  Negative for obsessions, compulsions or psychosis.     Fund of Knowledge Intact   Insight Poor   Judgement Poor   Attention Span & Concentration Intact   Recent & Remote Memory Intact    Gait Normal   Muscle Tone Intact        Labs     Labs reviewed.  No results found for this or any previous visit (from the past 24 hour(s)).     Impression   This is a 33 year  old female with previous psychiatric diagnoses that include major depression, generalized anxiety, and traits of borderline personality disorder. She obtains four previous inpatient mental health hospitalizations, her most recent being less than 1 month ago here at [facility]. During meeting with Dr. Madrigal this morning, the patient was again quiet, withdrawn, and appeared depressed in mood. She denied experiencing any side effects from her current medications, but noted that she does not believe her medications to be effective. From this, Dr. Madrigal has increased the patients Lamictal dose from 100mg to 200mg. Patient in agreement with this medication change. In addition to this, Dr. Madrigal again tried to encourage the patient to undergo one ECT session on 12/13/19, but the patient again refused this form of psychiatric treatment.      Diagnoses     1. Major depression, recurrent, severe, without psychotic features.  2. Generalized Anxiety Disorder  3. Traits of Borderline Personality Disorder     Plan     1. Explained side effects, benefits, and complications of medications to the patient, Pt gave verbal consent.  2. Medication changes: None.   3. Discussed treatment plan with patient and team.  4. Projected length of stay: 7+ days   5. Patient care order: please lock the patients bedroom door every 4 hours. Starting around 12:30pm       Attestation:   Alessandra LEBLANC, am serving as a scribe on 12/11/2019 to document services personally performed by Zhang Madrigal MD based on my observations and the provider's statements to me.    Patient ID:  Name: Misty Parham    MRN: [id number]  Admission: 11/25/2019   YOB: 1985

## 2019-12-11 NOTE — PROGRESS NOTES
Court paperwork was received today which states the prelim hearing is set for Friday 12-13 at 9am.    Final if needed will be Wed 12-18 (time to be determined.)

## 2019-12-12 PROCEDURE — 25000132 ZZH RX MED GY IP 250 OP 250 PS 637: Performed by: PSYCHIATRY & NEUROLOGY

## 2019-12-12 PROCEDURE — 12400000 ZZH R&B MH

## 2019-12-12 RX ADMIN — BREXPIPRAZOLE 3 MG: 2 TABLET ORAL at 10:09

## 2019-12-12 RX ADMIN — LAMOTRIGINE 200 MG: 100 TABLET ORAL at 10:09

## 2019-12-12 RX ADMIN — MIRTAZAPINE 45 MG: 45 TABLET, FILM COATED ORAL at 20:09

## 2019-12-12 RX ADMIN — VORTIOXETINE 20 MG: 20 TABLET, FILM COATED ORAL at 10:09

## 2019-12-12 ASSESSMENT — ACTIVITIES OF DAILY LIVING (ADL)
DRESS: INDEPENDENT
ORAL_HYGIENE: INDEPENDENT
ORAL_HYGIENE: INDEPENDENT
HYGIENE/GROOMING: INDEPENDENT
DRESS: INDEPENDENT
ORAL_HYGIENE: INDEPENDENT
DRESS: INDEPENDENT
LAUNDRY: WITH SUPERVISION
HYGIENE/GROOMING: INDEPENDENT
LAUNDRY: WITH SUPERVISION
HYGIENE/GROOMING: INDEPENDENT
LAUNDRY: WITH SUPERVISION

## 2019-12-12 NOTE — PROGRESS NOTES
[facility] Psychiatric Progress Note       Interim History     The patient's care was discussed with the treatment team and chart notes were reviewed. Pt seen on 12/12/19 by Dr. Madrigal. On interview, the patient reports she is doing well this afternoon and denies anything new occurring. Patient denies having any issues with her current medications or the overall care provided by [facility]. There will no medication adjustments made today, will leave patient on current regimen. It should be noted that in regards of the patients commitment process, perlim hearing is on 12/13/19 at 9:00am. Final hearing is yet to be determined.      Hospital Course   This is a 33 year old female with previous psychiatric diagnoses that include major depression, generalized anxiety, and traits of borderline personality disorder. She obtains four previous inpatient mental health hospitalizations, her most recent being less than 1 month ago here at [facility]. For current admission, the patient presented to [facility] from her outpatient psychiatric clinic due to suicidal ideation. The patient has experienced increased symptoms of depression and suicidal ideation within the past few weeks. Contemplated suicide by overdosing on her Remeron medication. It should be noted that the patient has stopped taking her psychiatric medications altogether. She was deemed appropriate for inpatient level of care for safety and further stabilization. While meeting with Dr. Madrigal this morning, petition for commitment and [place] were explained to patient. Dr. Madrigal believes this approach is necessary due to the patient being noncompliant with medications outside of the hospital setting, having numerous hospitalizations within the past year, and sabotaging aftercare treatment. Paperwork for commitment will be filled out by Dr. Madrigal.      Medications     Current Facility-Administered Medications Ordered in Epic   Medication Dose  Route Frequency Last Rate Last Dose     brexpiprazole (REXULTI) tablet 3 mg  3 mg Oral Daily   3 mg at 12/12/19 1009     hydrOXYzine (ATARAX) tablet 25 mg  25 mg Oral TID PRN         lamoTRIgine (LaMICtal) tablet 200 mg  200 mg Oral Daily   200 mg at 12/12/19 1009     mirtazapine (REMERON) tablet 45 mg  45 mg Oral At Bedtime   45 mg at 12/11/19 2042     vortioxetine (TRINTELLIX/BRINTELLIX) tablet 20 mg  20 mg Oral Daily   20 mg at 12/12/19 1009     No current [facility]-ordered outpatient medications on file.         Allergies      No Known Allergies     Medical Review of Systems     /67   Pulse 93   Temp 98.4  F (36.9  C) (Oral)   Resp 16   Wt 103.1 kg (227 lb 4.8 oz)   SpO2 98%   BMI 37.82 kg/m    Body mass index is 37.82 kg/m .  A 10-point review of systems was performed by Zhang Madrigal MD and is negative, no new findings.      Psychiatric Examination     Appearance Sitting in chair, dressed in hospital scrubs. Appears stated age.   Attitude Cooperative   Orientation Oriented to person, place, time   Eye Contact Poor   Speech Regular rate, rhythm, volume and tone   Language Normal   Psychomotor Behavior Normal   Mood Depressed   Affect Flat, quiet   Thought Process Goal-Oriented, Intact   Associations Intact   Thought Content Patient is currently positive for suicidal ideation, negative for plan or intent, able to contract no self harm and identify barriers to suicide.  Negative for obsessions, compulsions or psychosis.     Fund of Knowledge Intact   Insight Poor   Judgement Poor   Attention Span & Concentration Intact   Recent & Remote Memory Intact    Gait Normal   Muscle Tone Intact        Labs     Labs reviewed.  No results found for this or any previous visit (from the past 24 hour(s)).     Impression   This is a 33 year old female with previous psychiatric diagnoses that include major depression, generalized anxiety, and traits of borderline personality disorder. She obtains four previous  inpatient mental health hospitalizations, her most recent being less than 1 month ago here at [facility]. The patient denied having any problems with her current medications or overall care provided by [facility] while meeting with Dr. Madrigal this afternoon. There were no medication adjustments made today, will continue patient on current regimen. In regards of commitment process, patients perlim hearing is on 12/13/19 at 9:00am. Final hearing is yet to be determined.     Diagnoses     1. Major depression, recurrent, severe, without psychotic features.  2. Generalized Anxiety Disorder  3. Traits of Borderline Personality Disorder     Plan     1. Explained side effects, benefits, and complications of medications to the patient, Pt gave verbal consent.  2. Medication changes: None.   3. Discussed treatment plan with patient and team.  4. Projected length of stay: 7+ days   5. Patient care order: please lock the patients bedroom door every 4 hours. Starting around 12:30pm       Attestation:   Alessandra LEBLANC, am serving as a scribe on 12/12/2019 to document services personally performed by Zhang Madrigal MD based on my observations and the provider's statements to me.    Patient ID:  Name: Misty Parham    MRN: [id number]  Admission: 11/25/2019   YOB: 1985

## 2019-12-12 NOTE — PLAN OF CARE
Pt depressed and isolative. Pt met with . Pt took a shower and had dinner in the lounge. Pt was not willing to attend groups or come out of her room. Pt reports depression at 7 out of 10.  Encouraged Pt to work towards being a 6 out of 10. Pt worried about court on Friday as she does not know what to expect.

## 2019-12-12 NOTE — PLAN OF CARE
Problem: Depressive Symptoms  Goal: Depressive Symptoms  Description  Signs and symptoms of listed problems will be absent or manageable.  Outcome: No Change  Flowsheets (Taken 12/12/2019 7901)  Depressive Symptoms Assessed: all  Depressive Symptoms Present: affect; mood; insight; thought process  Note:   Pt presents with a blunt sad affect and depressed mood. Pt spent the majority of the shift in er room reading. Pt was helped coaxed to attend focus group by a peer but declined other unit programming. Pt was minimally participant in group. Pt was minimally social besides with her roommate and responded with short answers. Pt ate all of her food and was med compliant. Pt endorses passive Si.

## 2019-12-13 PROCEDURE — 12400000 ZZH R&B MH

## 2019-12-13 PROCEDURE — 25000132 ZZH RX MED GY IP 250 OP 250 PS 637: Performed by: PSYCHIATRY & NEUROLOGY

## 2019-12-13 RX ADMIN — VORTIOXETINE 20 MG: 20 TABLET, FILM COATED ORAL at 07:49

## 2019-12-13 RX ADMIN — MIRTAZAPINE 45 MG: 45 TABLET, FILM COATED ORAL at 20:54

## 2019-12-13 RX ADMIN — LAMOTRIGINE 200 MG: 100 TABLET ORAL at 07:49

## 2019-12-13 RX ADMIN — BREXPIPRAZOLE 3 MG: 2 TABLET ORAL at 07:49

## 2019-12-13 ASSESSMENT — ACTIVITIES OF DAILY LIVING (ADL)
DRESS: INDEPENDENT
HYGIENE/GROOMING: INDEPENDENT
LAUNDRY: WITH SUPERVISION
ORAL_HYGIENE: INDEPENDENT
DRESS: STREET CLOTHES
ORAL_HYGIENE: INDEPENDENT
HYGIENE/GROOMING: INDEPENDENT

## 2019-12-13 NOTE — PLAN OF CARE
Problem: Depressive Symptoms  Goal: Depressive Symptoms  Description  Signs and symptoms of listed problems will be absent or manageable.  Outcome: No Change  Flowsheets (Taken 12/13/2019 2375)  Depressive Symptoms Assessed: all  Depressive Symptoms Present: affect; mood; insight; thought process  Note:   Pt presents with a blunt affect and a depressed mood. Pt went to court on a pass from 08:45 to 12:30 today. She stated that hearing went okay and she's not sure how it went. Pleasant and cooperative with peers and staff. Pt ate all of her food and was med compliant. Pt still endorsed Si stating that she wants to die but contracts for safety.

## 2019-12-13 NOTE — PROGRESS NOTES
[facility] Psychiatric Progress Note       Interim History     The patient's care was discussed with the treatment team and chart notes were reviewed. According to nursing and psych associate staff notes from yesterday evening, the patient continued to be isolative and spent majority of her time bed resting. She expressed suicidal ideation with a plan to overdose on pills, able to contract for safety. Pt seen on 12/13/19 by Dr. Madrigal. On interview, the patient reports she's doing fine this morning. She denies having any issues or concerns in regards of her medications or overall care. Patient has court later on this morning in regards of commitment. Dr. Madrigal again tries to encourage the patient to agree with anticipated treatment plans such as undergoing ECT so that a full commitment does not have to be placed. The patient denies this once again.       Hospital Course   This is a 33 year old female with previous psychiatric diagnoses that include major depression, generalized anxiety, and traits of borderline personality disorder. She obtains four previous inpatient mental health hospitalizations, her most recent being less than 1 month ago here at [facility]. For current admission, the patient presented to [facility] from her outpatient psychiatric clinic due to suicidal ideation. The patient has experienced increased symptoms of depression and suicidal ideation within the past few weeks. Contemplated suicide by overdosing on her Remeron medication. It should be noted that the patient has stopped taking her psychiatric medications altogether. She was deemed appropriate for inpatient level of care for safety and further stabilization. While meeting with Dr. Madrigal this morning, petition for commitment and [place] were explained to patient. Dr. Madrigal believes this approach is necessary due to the patient being noncompliant with medications outside of the hospital setting, having numerous  hospitalizations within the past year, and sabotaging aftercare treatment. Paperwork for commitment will be filled out by Dr. Madrigal.      Medications     Current Facility-Administered Medications Ordered in Epic   Medication Dose Route Frequency Last Rate Last Dose     brexpiprazole (REXULTI) tablet 3 mg  3 mg Oral Daily   3 mg at 12/12/19 1009     hydrOXYzine (ATARAX) tablet 25 mg  25 mg Oral TID PRN         lamoTRIgine (LaMICtal) tablet 200 mg  200 mg Oral Daily   200 mg at 12/12/19 1009     mirtazapine (REMERON) tablet 45 mg  45 mg Oral At Bedtime   45 mg at 12/12/19 2009     vortioxetine (TRINTELLIX/BRINTELLIX) tablet 20 mg  20 mg Oral Daily   20 mg at 12/12/19 1009     No current [facility]-ordered outpatient medications on file.         Allergies      No Known Allergies     Medical Review of Systems     /53   Pulse 93   Temp 98.9  F (37.2  C) (Oral)   Resp 15   Wt 103.1 kg (227 lb 4.8 oz)   SpO2 99%   BMI 37.82 kg/m    Body mass index is 37.82 kg/m .  A 10-point review of systems was performed by Zhang Madrigal MD and is negative, no new findings.      Psychiatric Examination     Appearance Sitting in chair, dressed in hospital scrubs. Appears stated age.   Attitude Cooperative   Orientation Oriented to person, place, time   Eye Contact Poor   Speech Regular rate, rhythm, volume and tone   Language Normal   Psychomotor Behavior Normal   Mood Depressed   Affect Flat, quiet   Thought Process Goal-Oriented, Intact   Associations Intact   Thought Content Patient is currently positive for suicidal ideation, negative for plan or intent, able to contract no self harm and identify barriers to suicide.  Negative for obsessions, compulsions or psychosis.     Fund of Knowledge Intact   Insight Poor   Judgement Poor   Attention Span & Concentration Intact   Recent & Remote Memory Intact    Gait Normal   Muscle Tone Intact        Labs     Labs reviewed.  No results found for this or any previous visit (from  the past 24 hour(s)).     Impression   This is a 33 year old female with previous psychiatric diagnoses that include major depression, generalized anxiety, and traits of borderline personality disorder. She obtains four previous inpatient mental health hospitalizations, her most recent being less than 1 month ago here at [facility]. The patient was once again quite and withdrawn while meeting with Dr. Madrigal this morning. She denied having any issues or concerns in regards of her medications or overall care provided by [facility] . There were no medication adjustments made today. It should be noted that the patient has court this morning in regards of her commitment. Dr. Madrigal again tried to encourage the patient to become compliant with anticipated treatment plans, such as undergoing ECT. The patient however denied this once again.      Diagnoses     1. Major depression, recurrent, severe, without psychotic features.  2. Generalized Anxiety Disorder  3. Traits of Borderline Personality Disorder     Plan     1. Explained side effects, benefits, and complications of medications to the patient, Pt gave verbal consent.  2. Medication changes: None.   3. Discussed treatment plan with patient and team.  4. Projected length of stay: 7+ days   5. Patient care order: please lock the patients bedroom door every 4 hours. Starting around 12:30pm       Attestation:   Alessandra LEBLANC, am serving as a scribe on 12/13/2019 to document services personally performed by Zhang Madrigal MD based on my observations and the provider's statements to me.    Patient ID:  Name: Misty Parham    MRN: [id number]  Admission: 11/25/2019   YOB: 1985

## 2019-12-13 NOTE — PLAN OF CARE
"Pleasant and cooperative.  Spent much of the shift bed resting.  Declined groups.  States she is \"Alright.\"  Flat and depressed.  Endorses SI with a plan to overdose on pills.  Verbally contracts for safety.  She wants to be woken up in the morning so she has time to get ready for court.  Continue treatment plan.  "

## 2019-12-14 PROCEDURE — 25000132 ZZH RX MED GY IP 250 OP 250 PS 637: Performed by: PSYCHIATRY & NEUROLOGY

## 2019-12-14 PROCEDURE — 12400000 ZZH R&B MH

## 2019-12-14 RX ADMIN — MIRTAZAPINE 45 MG: 45 TABLET, FILM COATED ORAL at 20:50

## 2019-12-14 NOTE — PLAN OF CARE
"Pt well groomed today, states had court this morning and returns to court on Wed (12/18). Pt stated wasn't sure how court went, but did understand that it was preliminary. Pt expressed feeling discouraged, stating \"nothing works for me\". Teary at times. Pt has been resting in bed, but did get up to lounge for supper. Did not attend evening activities on the unit.   "

## 2019-12-15 PROCEDURE — 25000132 ZZH RX MED GY IP 250 OP 250 PS 637: Performed by: PSYCHIATRY & NEUROLOGY

## 2019-12-15 PROCEDURE — 12400000 ZZH R&B MH

## 2019-12-15 RX ADMIN — MIRTAZAPINE 45 MG: 45 TABLET, FILM COATED ORAL at 20:49

## 2019-12-15 RX ADMIN — BREXPIPRAZOLE 3 MG: 2 TABLET ORAL at 08:23

## 2019-12-15 RX ADMIN — LAMOTRIGINE 200 MG: 100 TABLET ORAL at 08:23

## 2019-12-15 RX ADMIN — VORTIOXETINE 20 MG: 20 TABLET, FILM COATED ORAL at 08:23

## 2019-12-15 ASSESSMENT — ACTIVITIES OF DAILY LIVING (ADL)
DRESS: INDEPENDENT
LAUNDRY: WITH SUPERVISION
HYGIENE/GROOMING: INDEPENDENT
DRESS: INDEPENDENT
ORAL_HYGIENE: INDEPENDENT
HYGIENE/GROOMING: INDEPENDENT
ORAL_HYGIENE: INDEPENDENT
ORAL_HYGIENE: INDEPENDENT
HYGIENE/GROOMING: INDEPENDENT
DRESS: INDEPENDENT

## 2019-12-15 NOTE — PLAN OF CARE
Shift Update: Pt mood is depressed and anxious; affect is sad. Thought process is poor and insight is impaired. Pt endorses thoughts of suicidal ideation. Pt states that she wants to die and nothing will work anyway. Pt contracts for safety.  Pt was withdrawn and isolative and only came from her room for her zarina meal.

## 2019-12-15 NOTE — PLAN OF CARE
Problem: Depressive Symptoms  Goal: Depressive Symptoms  Description  Signs and symptoms of listed problems will be absent or manageable.  Flowsheets (Taken 12/15/2019 6867)  Depressive Symptoms Assessed: all  Depressive Symptoms Present: affect; mood; thought process  Note:   Patient's vitals were WDL. Patient was took no PRNs and had no medication changes. Patient was medication compliant but states that the medications don't work and states that she wouldn't take them when she leaves. Patient also stated that eventually she would attempt suicide when she returns home. Patient was isolative and withdrawn.

## 2019-12-16 PROCEDURE — 25000132 ZZH RX MED GY IP 250 OP 250 PS 637: Performed by: PSYCHIATRY & NEUROLOGY

## 2019-12-16 PROCEDURE — 12400000 ZZH R&B MH

## 2019-12-16 RX ADMIN — LAMOTRIGINE 200 MG: 100 TABLET ORAL at 08:51

## 2019-12-16 RX ADMIN — VORTIOXETINE 20 MG: 20 TABLET, FILM COATED ORAL at 08:51

## 2019-12-16 RX ADMIN — MIRTAZAPINE 45 MG: 45 TABLET, FILM COATED ORAL at 21:43

## 2019-12-16 RX ADMIN — BREXPIPRAZOLE 3 MG: 2 TABLET ORAL at 08:51

## 2019-12-16 ASSESSMENT — ACTIVITIES OF DAILY LIVING (ADL)
HYGIENE/GROOMING: INDEPENDENT
LAUNDRY: WITH SUPERVISION
ORAL_HYGIENE: INDEPENDENT
DRESS: SCRUBS (BEHAVIORAL HEALTH)

## 2019-12-16 NOTE — PLAN OF CARE
BEHAVIORAL TEAM DISCUSSION    Participants:MD, CM, OT, RN, PA  Progress:suicidal, isolative, depressed, flat  Anticipated length of stay: unknown  Continued Stay Criteria/Rationale: pursuing [place] petition for commitment,medication management  Medical/Physical: NA  Precautions:   Behavioral Orders   Procedures    Code 1 - Restrict to Unit    Discontinue 1:1 attendant for suicide risk     Order Specific Question:   I have performed an in person assessment of the patient     Answer:   Based on this assessment the patient no longer requires a one on one attendant at this point in time.    Routine Programming     As clinically indicated    Status 15     Every 15 minutes.     Plan: pursue MI petition for commitment, medication management, encourage groups  Rationale for change in precautions or plan: NA

## 2019-12-16 NOTE — PROGRESS NOTES
"[facility] Psychiatric Progress Note       Interim History     The patient's care was discussed with the treatment team and chart notes were reviewed. Over the weekend, the patient appeared depressed in mood and expressed endorsing suicidal thoughts and ideation. She declared she wanted to die and nothing would work for her. The patient spent majority of her time isolated to her room. Pt seen on 12/16/19 by Dr. Madrigal. The patient reports she is doing fine this morning and slept alright last night, only waking up once. She denies experiencing any side effects from her current medications. Dr. Madrigal again presents the idea of the patient undergoing ECT while here inpatient. The patient however denies this form of treatment and states, \"it only helps for a little bit...it's just what it's gonna be.\" She reports that she wants to go home and die. We continue to wait on commitment verdict.      Hospital Course   This is a 33 year old female with previous psychiatric diagnoses that include major depression, generalized anxiety, and traits of borderline personality disorder. She obtains four previous inpatient mental health hospitalizations, her most recent being less than 1 month ago here at [facility]. For current admission, the patient presented to [facility] from her outpatient psychiatric clinic due to suicidal ideation. The patient has experienced increased symptoms of depression and suicidal ideation within the past few weeks. Contemplated suicide by overdosing on her Remeron medication. It should be noted that the patient has stopped taking her psychiatric medications altogether. She was deemed appropriate for inpatient level of care for safety and further stabilization. While meeting with Dr. Madrigal this morning, petition for commitment and [place] were explained to patient. Dr. Madrigal believes this approach is necessary due to the patient being noncompliant with medications outside of the " hospital setting, having numerous hospitalizations within the past year, and sabotaging aftercare treatment. Paperwork for commitment will be filled out by Dr. Madrigal.      Medications     Current Facility-Administered Medications Ordered in Epic   Medication Dose Route Frequency Last Rate Last Dose     brexpiprazole (REXULTI) tablet 3 mg  3 mg Oral Daily   3 mg at 12/15/19 0823     hydrOXYzine (ATARAX) tablet 25 mg  25 mg Oral TID PRN         lamoTRIgine (LaMICtal) tablet 200 mg  200 mg Oral Daily   200 mg at 12/15/19 0823     mirtazapine (REMERON) tablet 45 mg  45 mg Oral At Bedtime   45 mg at 12/15/19 2049     vortioxetine (TRINTELLIX/BRINTELLIX) tablet 20 mg  20 mg Oral Daily   20 mg at 12/15/19 0823     No current [facility]-ordered outpatient medications on file.         Allergies      No Known Allergies     Medical Review of Systems     BP 96/65   Pulse 75   Temp 98.2  F (36.8  C) (Oral)   Resp 16   Wt 103.1 kg (227 lb 4.8 oz)   SpO2 95%   BMI 37.82 kg/m    Body mass index is 37.82 kg/m .  A 10-point review of systems was performed by Zhang Madrigal MD and is negative, no new findings.      Psychiatric Examination     Appearance Sitting in chair, dressed in hospital scrubs. Appears stated age.   Attitude Cooperative   Orientation Oriented to person, place, time   Eye Contact Poor   Speech Regular rate, rhythm, volume and tone   Language Normal   Psychomotor Behavior Normal   Mood Depressed   Affect Flat, quiet   Thought Process Goal-Oriented, Intact   Associations Intact   Thought Content Patient is currently positive for suicidal ideation, negative for plan or intent, able to contract no self harm and identify barriers to suicide.  Negative for obsessions, compulsions or psychosis.     Fund of Knowledge Intact   Insight Poor   Judgement Poor   Attention Span & Concentration Intact   Recent & Remote Memory Intact    Gait Normal   Muscle Tone Intact        Labs     Labs reviewed.  No results found for  this or any previous visit (from the past 24 hour(s)).     Impression   This is a 33 year old female with previous psychiatric diagnoses that include major depression, generalized anxiety, and traits of borderline personality disorder. She obtains four previous inpatient mental health hospitalizations, her most recent being less than 1 month ago here at [facility].  While meeting with Dr. Madrigal this morning, she again appeared depressed in mood and flat in affect. She confirmed suicidal thoughts and ideations, and declared that all she wants to do is go home and die. Dr. Madrigal presented the idea of the patient undergoing ECT once again, however she denied. We continue to wait for petition verdict.       Diagnoses     1. Major depression, recurrent, severe, without psychotic features.  2. Generalized Anxiety Disorder  3. Traits of Borderline Personality Disorder     Plan     1. Explained side effects, benefits, and complications of medications to the patient, Pt gave verbal consent.  2. Medication changes: None.   3. Discussed treatment plan with patient and team.  4. Projected length of stay: 7+ days       Attestation:   I, Alessandra Chambers, am serving as a scribe on 12/16/2019 to document services personally performed by Zhang Madrigal MD based on my observations and the provider's statements to me.    Patient ID:  Name: Misty Parham    MRN: [id number]  Admission: 11/25/2019   YOB: 1985

## 2019-12-16 NOTE — PLAN OF CARE
"spent the majority of the shift in her room. States she is \"Hanging in\" Also states she does not believe that her medications are helping. Mood is depressed. Affect flat and blunt    "

## 2019-12-16 NOTE — PLAN OF CARE
Problem: Suicidal Behavior  Goal: Suicidal Behavior is Absent or Managed  Flowsheets (Taken 12/15/2019 2223)  Mutually Determined Action Steps (Facilitate Resolution of Suicidal Intent): identifies protective factors; sets future-oriented goal; identifies crisis plan  Mutually Determined Action Steps (Provide Immediate/Ongoing Protective Physical Environment): verbalizes safety check rationale; identifies home safety strategy; shares suicidal thoughts  Note:   Pt spend majority of the evening in her room. Did not want to discuss her feelings. Was med compliant. Denied pain, AVH. Came out of room for meals and to shower. Will continue to monitor for safety.

## 2019-12-17 PROCEDURE — 25000132 ZZH RX MED GY IP 250 OP 250 PS 637: Performed by: PSYCHIATRY & NEUROLOGY

## 2019-12-17 PROCEDURE — 12400000 ZZH R&B MH

## 2019-12-17 RX ADMIN — VORTIOXETINE 20 MG: 20 TABLET, FILM COATED ORAL at 08:55

## 2019-12-17 RX ADMIN — BREXPIPRAZOLE 3 MG: 2 TABLET ORAL at 08:55

## 2019-12-17 RX ADMIN — MIRTAZAPINE 45 MG: 45 TABLET, FILM COATED ORAL at 20:33

## 2019-12-17 RX ADMIN — LAMOTRIGINE 200 MG: 100 TABLET ORAL at 08:55

## 2019-12-17 ASSESSMENT — ACTIVITIES OF DAILY LIVING (ADL)
LAUNDRY: UNABLE TO COMPLETE
HYGIENE/GROOMING: INDEPENDENT
DRESS: SCRUBS (BEHAVIORAL HEALTH)
DRESS: SCRUBS (BEHAVIORAL HEALTH)
LAUNDRY: WITH SUPERVISION
ORAL_HYGIENE: INDEPENDENT
ORAL_HYGIENE: INDEPENDENT
HYGIENE/GROOMING: INDEPENDENT

## 2019-12-17 NOTE — PROGRESS NOTES
"[facility] Psychiatric Progress Note       Interim History     The patient's care was discussed with the treatment team and chart notes were reviewed. Pt seen on 12/17/19 by Dr. Madrigal. Patient reports she is doing alright this morning and slept ok last night. Dr. Madrigal asks more about patients previous and current psychiatric medications and if they have ever helped her symptoms of depression. Patient does not believe any medication has helped her. She notes she normally takes her medications for some time once discharged from the hospital, but then stops them, stating,\"they don't work so why take them?\" Dr. Madrigal again explains why ECT would be a great option for patient at this point, however patient again denied this form of treatment.      Hospital Course   This is a 33 year old female with previous psychiatric diagnoses that include major depression, generalized anxiety, and traits of borderline personality disorder. She obtains four previous inpatient mental health hospitalizations, her most recent being less than 1 month ago here at [facility]. For current admission, the patient presented to [facility] from her outpatient psychiatric clinic due to suicidal ideation. The patient has experienced increased symptoms of depression and suicidal ideation within the past few weeks. Contemplated suicide by overdosing on her Remeron medication. It should be noted that the patient has stopped taking her psychiatric medications altogether. She was deemed appropriate for inpatient level of care for safety and further stabilization. While meeting with Dr. Madrigal this morning, petition for commitment and [employer] were explained to patient. Dr. Madrigal believes this approach is necessary due to the patient being noncompliant with medications outside of the hospital setting, having numerous hospitalizations within the past year, and sabotaging aftercare treatment. Paperwork for commitment will be " filled out by Dr. Madrigal.      Medications     Current Facility-Administered Medications Ordered in Epic   Medication Dose Route Frequency Last Rate Last Dose     brexpiprazole (REXULTI) tablet 3 mg  3 mg Oral Daily   3 mg at 12/16/19 0851     hydrOXYzine (ATARAX) tablet 25 mg  25 mg Oral TID PRN         lamoTRIgine (LaMICtal) tablet 200 mg  200 mg Oral Daily   200 mg at 12/16/19 0851     mirtazapine (REMERON) tablet 45 mg  45 mg Oral At Bedtime   45 mg at 12/16/19 2143     vortioxetine (TRINTELLIX/BRINTELLIX) tablet 20 mg  20 mg Oral Daily   20 mg at 12/16/19 0851     No current [facility]-ordered outpatient medications on file.         Allergies      No Known Allergies     Medical Review of Systems     /72   Pulse 75   Temp 98.4  F (36.9  C) (Oral)   Resp 15   Wt 103.1 kg (227 lb 4.8 oz)   SpO2 97%   BMI 37.82 kg/m    Body mass index is 37.82 kg/m .  A 10-point review of systems was performed by hZang Madrigal MD and is negative, no new findings.      Psychiatric Examination     Appearance Sitting in chair, dressed in hospital scrubs. Appears stated age.   Attitude Cooperative   Orientation Oriented to person, place, time   Eye Contact Poor   Speech Regular rate, rhythm, volume and tone   Language Normal   Psychomotor Behavior Normal   Mood Depressed   Affect Flat, quiet   Thought Process Goal-Oriented, Intact   Associations Intact   Thought Content Patient is currently positive for suicidal ideation, negative for plan or intent, able to contract no self harm and identify barriers to suicide.  Negative for obsessions, compulsions or psychosis.     Fund of Knowledge Intact   Insight Poor   Judgement Poor   Attention Span & Concentration Intact   Recent & Remote Memory Intact    Gait Normal   Muscle Tone Intact        Labs     Labs reviewed.  No results found for this or any previous visit (from the past 24 hour(s)).     Impression   This is a 33 year old female with previous psychiatric diagnoses that  include major depression, generalized anxiety, and traits of borderline personality disorder. She obtains four previous inpatient mental health hospitalizations, her most recent being less than 1 month ago here at [facility]. The patient again presented depressed in mood and flat in affect while meeting with Dr. Madrigal this morning. She denied experiencing any issues or side effects from her current medication regimen, but proceeds to believe that her medications are ineffective. From this, Dr. Madrigal again explained why ECT would be a great treatment option at this point, however the patient continued to deny undergoing ECT. There were no medication changes made today.      Diagnoses     1. Major depression, recurrent, severe, without psychotic features.  2. Generalized Anxiety Disorder  3. Traits of Borderline Personality Disorder     Plan     1. Explained side effects, benefits, and complications of medications to the patient, Pt gave verbal consent.  2. Medication changes: None.   3. Discussed treatment plan with patient and team.  4. Projected length of stay: 7+ days       Attestation:   Alessandra LEBLANC, am serving as a scribe on 12/17/2019 to document services personally performed by Zhang Madrigal MD based on my observations and the provider's statements to me.    Patient ID:  Name: Misty Parham    MRN: [id number]  Admission: 11/25/2019   YOB: 1985

## 2019-12-17 NOTE — PLAN OF CARE
Pt. Remains blunt, flat, withdrawn, and isolative to room. Spent the shift in room reading a book. Did not attend groups or interact with peers. Continued hopelessness.

## 2019-12-18 PROCEDURE — 12400000 ZZH R&B MH

## 2019-12-18 PROCEDURE — 25000132 ZZH RX MED GY IP 250 OP 250 PS 637: Performed by: PSYCHIATRY & NEUROLOGY

## 2019-12-18 RX ADMIN — VORTIOXETINE 20 MG: 20 TABLET, FILM COATED ORAL at 08:23

## 2019-12-18 RX ADMIN — BREXPIPRAZOLE 3 MG: 2 TABLET ORAL at 08:23

## 2019-12-18 RX ADMIN — MIRTAZAPINE 45 MG: 45 TABLET, FILM COATED ORAL at 20:37

## 2019-12-18 RX ADMIN — LAMOTRIGINE 200 MG: 100 TABLET ORAL at 08:23

## 2019-12-18 ASSESSMENT — ACTIVITIES OF DAILY LIVING (ADL)
HYGIENE/GROOMING: INDEPENDENT
ORAL_HYGIENE: INDEPENDENT
HYGIENE/GROOMING: INDEPENDENT
DRESS: INDEPENDENT
ORAL_HYGIENE: INDEPENDENT
LAUNDRY: WITH SUPERVISION
DRESS: INDEPENDENT
LAUNDRY: WITH SUPERVISION

## 2019-12-18 NOTE — PROGRESS NOTES
Dr Madrigal completed a [employer] petition today and I faxed it to the Manjeet Irvin Atty's office.

## 2019-12-18 NOTE — PROGRESS NOTES
[facility] Psychiatric Progress Note       Interim History     The patient's care was discussed with the treatment team and chart notes were reviewed. Pt seen on 12/18/19 by Dr. Madrigal. The patient reports she is doing fine this morning. She denies having any issues or experiencing any side effects from her psychiatric medications. Dr. Madrigal does not wish to make any modifications to current regimen today. Aside from this, Dr. Madrigal reviews petition for authorization to impose ECT treatment with patient. Patient has nothing to say about this. Dr. Madrigal plans to file paperwork this morning for this.      Hospital Course   This is a 33 year old female with previous psychiatric diagnoses that include major depression, generalized anxiety, and traits of borderline personality disorder. She obtains four previous inpatient mental health hospitalizations, her most recent being less than 1 month ago here at [facility]. For current admission, the patient presented to [facility] from her outpatient psychiatric clinic due to suicidal ideation. The patient has experienced increased symptoms of depression and suicidal ideation within the past few weeks. Contemplated suicide by overdosing on her Remeron medication. It should be noted that the patient has stopped taking her psychiatric medications altogether. She was deemed appropriate for inpatient level of care for safety and further stabilization. While meeting with Dr. Madrigal this morning, petition for commitment and [employer] were explained to patient. Dr. Madrigal believes this approach is necessary due to the patient being noncompliant with medications outside of the hospital setting, having numerous hospitalizations within the past year, and sabotaging aftercare treatment. Paperwork for commitment will be filled out by Dr. Madrigal.      Medications     Current Facility-Administered Medications Ordered in Epic   Medication Dose Route Frequency  Last Rate Last Dose     brexpiprazole (REXULTI) tablet 3 mg  3 mg Oral Daily   3 mg at 12/17/19 0855     hydrOXYzine (ATARAX) tablet 25 mg  25 mg Oral TID PRN         lamoTRIgine (LaMICtal) tablet 200 mg  200 mg Oral Daily   200 mg at 12/17/19 0855     mirtazapine (REMERON) tablet 45 mg  45 mg Oral At Bedtime   45 mg at 12/17/19 2033     vortioxetine (TRINTELLIX/BRINTELLIX) tablet 20 mg  20 mg Oral Daily   20 mg at 12/17/19 0855     No current [facility]-ordered outpatient medications on file.         Allergies      No Known Allergies     Medical Review of Systems     /77   Pulse 75   Temp 98.5  F (36.9  C) (Oral)   Resp 16   Wt 101.5 kg (223 lb 11.2 oz)   SpO2 96%   BMI 37.23 kg/m    Body mass index is 37.23 kg/m .  A 10-point review of systems was performed by Zhang Madrigal MD and is negative, no new findings.      Psychiatric Examination     Appearance Sitting in chair, dressed in hospital scrubs. Appears stated age.   Attitude Cooperative   Orientation Oriented to person, place, time   Eye Contact Poor   Speech Regular rate, rhythm, volume and tone   Language Normal   Psychomotor Behavior Normal   Mood Depressed   Affect Flat, quiet   Thought Process Goal-Oriented, Intact   Associations Intact   Thought Content Patient is currently positive for suicidal ideation, negative for plan or intent, able to contract no self harm and identify barriers to suicide.  Negative for obsessions, compulsions or psychosis.     Fund of Knowledge Intact   Insight Poor   Judgement Poor   Attention Span & Concentration Intact   Recent & Remote Memory Intact    Gait Normal   Muscle Tone Intact        Labs     Labs reviewed.  No results found for this or any previous visit (from the past 24 hour(s)).     Impression   This is a 33 year old female with previous psychiatric diagnoses that include major depression, generalized anxiety, and traits of borderline personality disorder. She obtains four previous inpatient mental  health hospitalizations, her most recent being less than 1 month ago here at [facility]. There was no changes in patient while meeting with Dr. Madrigal this morning. She denied having issues or experiencing any side effects from her current medications. There were no changes to regimen today. Aside from this, the petition for authorization to impose ECT treatment was explained to patient during interview. Patient did not appear ok with this. Paperwork for this will be filed this morning by Dr. Madrigal.      Diagnoses     1. Major depression, recurrent, severe, without psychotic features.  2. Generalized Anxiety Disorder  3. Traits of Borderline Personality Disorder     Plan     1. Explained side effects, benefits, and complications of medications to the patient, Pt gave verbal consent.  2. Medication changes: None.   3. Discussed treatment plan with patient and team.  4. Projected length of stay: 7+ days  5. File petition for authorization to impose ECT treatment       Attestation:   Alessandra LEBLANC, am serving as a scribe on 12/18/2019 to document services personally performed by Zhang Madrigal MD based on my observations and the provider's statements to me.    Patient ID:  Name: Misty Parham    MRN: [id number]  Admission: 11/25/2019   YOB: 1985

## 2019-12-18 NOTE — PLAN OF CARE
A&O. VSS. presents as sad , mood is depressed, with flat affect. Speech is clear and coherent. Thought process is impaired. Has been isolative to her room for the majority of the shift. Has poor insight into her situation. Continues to endorse suicidal ideation, but contracts for safety while in the hospital. Pt's children visited her this evening, which brightened her mood. Regular diet. Up independently. Continent of bowel and bladder Denies pain. Pt has a court hearing tomorrow 12/18 at 1545.

## 2019-12-18 NOTE — PLAN OF CARE
Problem: Depressive Symptoms  Goal: Depressive Symptoms  Description  Signs and symptoms of listed problems will be absent or manageable.  Outcome: No Change  Flowsheets (Taken 12/18/2019 1309)  Depressive Symptoms Assessed: all  Depressive Symptoms Present: affect; mood; insight; other (see comment)  Note:   Pt presents with a flat, sad affect and depressed mood. Pt became irritable when talking about her court date this afternoon - she remains hopeless, and feels that nothing can help her. Her insight is poor and judgement is impaired. Pt continues to be isolative, and spent the day in her room. She refuses to attend groups or interact with peers, and comes out to eat only when prompted. Pt endorses SI but with no plan, and is med-compliant.

## 2019-12-19 PROCEDURE — 25000132 ZZH RX MED GY IP 250 OP 250 PS 637: Performed by: PSYCHIATRY & NEUROLOGY

## 2019-12-19 PROCEDURE — 12400000 ZZH R&B MH

## 2019-12-19 RX ADMIN — LAMOTRIGINE 200 MG: 100 TABLET ORAL at 08:55

## 2019-12-19 RX ADMIN — BREXPIPRAZOLE 3 MG: 2 TABLET ORAL at 08:54

## 2019-12-19 RX ADMIN — MIRTAZAPINE 45 MG: 45 TABLET, FILM COATED ORAL at 21:28

## 2019-12-19 RX ADMIN — VORTIOXETINE 20 MG: 20 TABLET, FILM COATED ORAL at 08:54

## 2019-12-19 ASSESSMENT — ACTIVITIES OF DAILY LIVING (ADL)
LAUNDRY: WITH SUPERVISION
HYGIENE/GROOMING: INDEPENDENT
HYGIENE/GROOMING: INDEPENDENT
ORAL_HYGIENE: INDEPENDENT
ORAL_HYGIENE: INDEPENDENT
LAUNDRY: WITH SUPERVISION
DRESS: INDEPENDENT
ORAL_HYGIENE: INDEPENDENT
HYGIENE/GROOMING: INDEPENDENT
DRESS: INDEPENDENT
LAUNDRY: WITH SUPERVISION
DRESS: INDEPENDENT

## 2019-12-19 NOTE — PROGRESS NOTES
[facility] Psychiatric Progress Note       Interim History     The patient's care was discussed with the treatment team and chart notes were reviewed. Pt seen on 12/19/19 by Dr. Madrigal. Patient states she is doing fine this afternoon and denies having any new issues. It should be noted that the patient had her final commitment hearing yesterday afternoon. The case is currently under advisement, we should be hearing from the courts relatively soon. Dr. Madrigal reviews this with the patient. He also informs the patient that petition for authorization to impose ECT treatment has been filed. The patient again denies doing ECT voluntarily. Will not make any medication changes today.      Hospital Course   This is a 33 year old female with previous psychiatric diagnoses that include major depression, generalized anxiety, and traits of borderline personality disorder. She obtains four previous inpatient mental health hospitalizations, her most recent being less than 1 month ago here at [facility]. For current admission, the patient presented to [facility] from her outpatient psychiatric clinic due to suicidal ideation. The patient has experienced increased symptoms of depression and suicidal ideation within the past few weeks. Contemplated suicide by overdosing on her Remeron medication. It should be noted that the patient has stopped taking her psychiatric medications altogether. She was deemed appropriate for inpatient level of care for safety and further stabilization. While meeting with Dr. Madrigal this morning, petition for commitment and [facility] were explained to patient. Dr. Madrigal believes this approach is necessary due to the patient being noncompliant with medications outside of the hospital setting, having numerous hospitalizations within the past year, and sabotaging aftercare treatment. Paperwork for commitment will be filled out by Dr. Madrigal.      Medications     Current  Facility-Administered Medications Ordered in Epic   Medication Dose Route Frequency Last Rate Last Dose     brexpiprazole (REXULTI) tablet 3 mg  3 mg Oral Daily   3 mg at 12/19/19 0854     hydrOXYzine (ATARAX) tablet 25 mg  25 mg Oral TID PRN         lamoTRIgine (LaMICtal) tablet 200 mg  200 mg Oral Daily   200 mg at 12/19/19 0855     mirtazapine (REMERON) tablet 45 mg  45 mg Oral At Bedtime   45 mg at 12/18/19 2037     vortioxetine (TRINTELLIX/BRINTELLIX) tablet 20 mg  20 mg Oral Daily   20 mg at 12/19/19 0854     No current [facility]-ordered outpatient medications on file.         Allergies      No Known Allergies     Medical Review of Systems     /63   Pulse 71   Temp 97.9  F (36.6  C) (Oral)   Resp 16   Wt 101.5 kg (223 lb 11.2 oz)   SpO2 95%   BMI 37.23 kg/m    Body mass index is 37.23 kg/m .  A 10-point review of systems was performed by Zhang Madrigal MD and is negative, no new findings.      Psychiatric Examination     Appearance Sitting in chair, dressed in hospital scrubs. Appears stated age.   Attitude Cooperative   Orientation Oriented to person, place, time   Eye Contact Poor   Speech Regular rate, rhythm, volume and tone   Language Normal   Psychomotor Behavior Normal   Mood Depressed   Affect Flat, quiet   Thought Process Goal-Oriented, Intact   Associations Intact   Thought Content Patient is currently positive for suicidal ideation, negative for plan or intent, able to contract no self harm and identify barriers to suicide.  Negative for obsessions, compulsions or psychosis.     Fund of Knowledge Intact   Insight Poor   Judgement Poor   Attention Span & Concentration Intact   Recent & Remote Memory Intact    Gait Normal   Muscle Tone Intact        Labs     Labs reviewed.  No results found for this or any previous visit (from the past 24 hour(s)).     Impression   This is a 33 year old female with previous psychiatric diagnoses that include major depression, generalized anxiety, and traits  of borderline personality disorder. She obtains four previous inpatient mental health hospitalizations, her most recent being less than 1 month ago here at [facility]. There was no difference in patient while meeting with Dr. Madrigal this afternoon. Dr. Madrigal reviewed the petition for authorization to impose ECT treatment with patient this afternoon. The patient continues to deny doing ECT voluntarily. No medications were changed today.      Diagnoses     1. Major depression, recurrent, severe, without psychotic features.  2. Generalized Anxiety Disorder  3. Traits of Borderline Personality Disorder     Plan     1. Explained side effects, benefits, and complications of medications to the patient, Pt gave verbal consent.  2. Medication changes: None.   3. Discussed treatment plan with patient and team.  4. Projected length of stay: 7+ days  5. File petition for authorization to impose ECT treatment       Attestation:   I, Alessandra Chambers, am serving as a scribe on 12/19/2019 to document services personally performed by Zhang Madrigal MD based on my observations and the provider's statements to me.    Patient ID:  Name: Misty Parham    MRN: [id number]  Admission: 11/25/2019   YOB: 1985

## 2019-12-19 NOTE — PROGRESS NOTES
Court orders were received today.    As of 12-19-19, pt is committed as MI to [facility] and [facility].   There is also a [employer] order for:  Rexuli, Vraylar, Latuda, and Abilify.

## 2019-12-19 NOTE — PLAN OF CARE
Pt presents with flat affect and depressed lalito. Pt's insight is poor and judgment is impaired. Pt endorses SI and during one on one stated she does not see a point in living right now and that she has been feeling completely hopeless. Pt spent most of shift in her room either reading or resting. Pt did not attend unit programming and is med compliant.

## 2019-12-19 NOTE — PLAN OF CARE
Problem: Depressive Symptoms  Goal: Depressive Symptoms  Description  Signs and symptoms of listed problems will be absent or manageable.  12/18/2019 2207 by Domenica Grimes  Outcome: No Change  Flowsheets (Taken 12/18/2019 2207)  Depressive Symptoms Assessed: all  Depressive Symptoms Present: affect; mood; insight; other (see comment)  Note:   Pt presents with a flat affect and depressed mood. After returning from court, pt stated that she feels angry and should be back at home. She refused to attend evening groups or engage with peers. Her insight is poor and judgement is impaired - pt provides minimal responses during conversation. She expressed wanting to dig her nails into her wrists, and endorses SI. Pt is med-compliant.

## 2019-12-19 NOTE — PLAN OF CARE
Problem: Depressive Symptoms  Goal: Depressive Symptoms  Description  Signs and symptoms of listed problems will be absent or manageable.  Flowsheets (Taken 12/19/2019 1742)  Depressive Symptoms Assessed: all  Depressive Symptoms Present: mood; affect; insight; other (see comment); suicidality  Note:   Pt presents with a flat affect and depressed mood. Her insight is poor and judgement is impaired. Pt states that she feels angry and hopeless. She continues to insist that treatment will not help her and endorses SI, stating that there is no point to her life. Pt attended OT group this morning with prompting but refused all other activity. She is med-compliant.

## 2019-12-19 NOTE — PROGRESS NOTES
Pt had final commitment hearing yesterday, 12-18-19.   The case is under advisement.   We expect to receive the outcome within several business days.

## 2019-12-20 PROCEDURE — 25000132 ZZH RX MED GY IP 250 OP 250 PS 637: Performed by: PSYCHIATRY & NEUROLOGY

## 2019-12-20 PROCEDURE — 12400000 ZZH R&B MH

## 2019-12-20 RX ADMIN — MIRTAZAPINE 45 MG: 45 TABLET, FILM COATED ORAL at 21:56

## 2019-12-20 RX ADMIN — HYDROXYZINE HYDROCHLORIDE 25 MG: 25 TABLET, FILM COATED ORAL at 22:50

## 2019-12-20 ASSESSMENT — ACTIVITIES OF DAILY LIVING (ADL)
DRESS: INDEPENDENT
HYGIENE/GROOMING: INDEPENDENT
ORAL_HYGIENE: INDEPENDENT

## 2019-12-20 NOTE — PLAN OF CARE
Problem: Depressive Symptoms  Goal: Depressive Symptoms  Description  Signs and symptoms of listed problems will be absent or manageable.  Flowsheets (Taken 12/20/2019 1404)  Depressive Symptoms Assessed: all  Depressive Symptoms Present: mood; affect; insight  Note:   Patient vitals were WDL. Patient refused all her scheduled psychiatric medications this shift. Patient took no PRNs. Patient remained in bed for most of the shift except for meals. Patient is making nail marks on her hand from her finger nails by pressing down on them hard. Patient endorses suicidal thoughts. Patient states that she is depressed and feelings hopeless. After lunch Patient finally came out of her room due to staff insistence.

## 2019-12-20 NOTE — PROGRESS NOTES
"[facility] Psychiatric Progress Note       Interim History     The patient's care was discussed with the treatment team and chart notes were reviewed. Pt seen on 12/20/19 by Dr. Madrigal. It should be noted that yesterday, court orders were received. As of 12/19/19, the patient is committed as MI to [facility] and [facility]. [facility] for Vraylar, Rexulti, Latuda, and Abilify. Dr. Madrigal informs the patient about this, and offers the patient once again to receive ECT voluntarily. The patient however states, \"it's just useless.\" Dr. Madrigal will ask Dr. Larson for a second opinion so that the petition for authorization to impose ECT may be supported.     Hospital Course   This is a 33 year old female with previous psychiatric diagnoses that include major depression, generalized anxiety, and traits of borderline personality disorder. She obtains four previous inpatient mental health hospitalizations, her most recent being less than 1 month ago here at [facility]. For current admission, the patient presented to [facility] from her outpatient psychiatric clinic due to suicidal ideation. The patient has experienced increased symptoms of depression and suicidal ideation within the past few weeks. Contemplated suicide by overdosing on her Remeron medication. It should be noted that the patient has stopped taking her psychiatric medications altogether. She was deemed appropriate for inpatient level of care for safety and further stabilization. While meeting with Dr. Madrigal this morning, petition for commitment and [place] were explained to patient. Dr. Madrigal believes this approach is necessary due to the patient being noncompliant with medications outside of the hospital setting, having numerous hospitalizations within the past year, and sabotaging aftercare treatment. Paperwork for commitment will be filled out by Dr. Madrigal.      Medications     Current Facility-Administered Medications Ordered in Epic "   Medication Dose Route Frequency Last Rate Last Dose     brexpiprazole (REXULTI) tablet 3 mg  3 mg Oral Daily   3 mg at 12/19/19 0854     hydrOXYzine (ATARAX) tablet 25 mg  25 mg Oral TID PRN         lamoTRIgine (LaMICtal) tablet 200 mg  200 mg Oral Daily   200 mg at 12/19/19 0855     mirtazapine (REMERON) tablet 45 mg  45 mg Oral At Bedtime   45 mg at 12/19/19 2128     vortioxetine (TRINTELLIX/BRINTELLIX) tablet 20 mg  20 mg Oral Daily   20 mg at 12/19/19 0854     No current [facility]-ordered outpatient medications on file.         Allergies      No Known Allergies     Medical Review of Systems     BP 94/69   Pulse 71   Temp 98.5  F (36.9  C) (Oral)   Resp 15   Wt 101.5 kg (223 lb 11.2 oz)   SpO2 97%   BMI 37.23 kg/m    Body mass index is 37.23 kg/m .  A 10-point review of systems was performed by Zhang Madrigal MD and is negative, no new findings.      Psychiatric Examination     Appearance Sitting in chair, dressed in hospital scrubs. Appears stated age.   Attitude Cooperative   Orientation Oriented to person, place, time   Eye Contact Poor   Speech Regular rate, rhythm, volume and tone   Language Normal   Psychomotor Behavior Normal   Mood Depressed   Affect Flat, quiet   Thought Process Goal-Oriented, Intact   Associations Intact   Thought Content Patient is currently positive for suicidal ideation, negative for plan or intent, able to contract no self harm and identify barriers to suicide.  Negative for obsessions, compulsions or psychosis.     Fund of Knowledge Intact   Insight Poor   Judgement Poor   Attention Span & Concentration Intact   Recent & Remote Memory Intact    Gait Normal   Muscle Tone Intact        Labs     Labs reviewed.  No results found for this or any previous visit (from the past 24 hour(s)).     Impression   This is a 33 year old female with previous psychiatric diagnoses that include major depression, generalized anxiety, and traits of borderline personality disorder. She  obtains four previous inpatient mental health hospitalizations, her most recent being less than 1 month ago here at [facility]. As of 12/19/19, the patient is committed as MI to [facility] and [facility]. [facility] for Vraylar, Rexulti, Latuda, and Abilify. The patient continues to deny ECT voluntarily. Dr. Madrigal will ask Dr. Larson for a second opinion so that the petition for authorization to impose ECT may be supported.      Diagnoses     1. Major depression, recurrent, severe, without psychotic features.  2. Generalized Anxiety Disorder  3. Traits of Borderline Personality Disorder     Plan     1. Explained side effects, benefits, and complications of medications to the patient, Pt gave verbal consent.  2. Medication changes: None.   3. Discussed treatment plan with patient and team.  4. Projected length of stay: 7+ days  5. Filed petition for authorization to impose ECT treatment   6. As of 12/19/19, the patient is committed as MI to [facility] and [facility]. [facility] for Vraylar, Rexulti, Latuda, and Abilify.       Attestation:   I, Alessandra Chambers, am serving as a scribe on 12/20/2019 to document services personally performed by Zhang Madrigal MD based on my observations and the provider's statements to me.    Patient ID:  Name: Misty Parham    MRN: [id number]  Admission: 11/25/2019   YOB: 1985

## 2019-12-21 PROCEDURE — 25000132 ZZH RX MED GY IP 250 OP 250 PS 637: Performed by: PSYCHIATRY & NEUROLOGY

## 2019-12-21 PROCEDURE — 12400000 ZZH R&B MH

## 2019-12-21 RX ADMIN — VORTIOXETINE 20 MG: 20 TABLET, FILM COATED ORAL at 09:05

## 2019-12-21 RX ADMIN — LAMOTRIGINE 200 MG: 100 TABLET ORAL at 09:05

## 2019-12-21 RX ADMIN — BREXPIPRAZOLE 3 MG: 2 TABLET ORAL at 09:05

## 2019-12-21 RX ADMIN — MIRTAZAPINE 45 MG: 45 TABLET, FILM COATED ORAL at 22:19

## 2019-12-21 ASSESSMENT — ACTIVITIES OF DAILY LIVING (ADL)
DRESS: INDEPENDENT
ORAL_HYGIENE: INDEPENDENT
HYGIENE/GROOMING: INDEPENDENT

## 2019-12-21 NOTE — PLAN OF CARE
Problem: Depressive Symptoms  Goal: Depressive Symptoms  Description  Signs and symptoms of listed problems will be absent or manageable.  12/21/2019 1456 by Quan Hester  Flowsheets (Taken 12/21/2019 1456)  Depressive Symptoms Assessed: all  Depressive Symptoms Present: mood; affect; insight  Note:   Patient's vitals were WDL. Patient was medication compliant, had no medication changes, and took no PRNs. Patient still endorses suicidal ideation and states that her mood is poor. Patient reports high anxiety. Patient remained in bed for most of the shift but did get up to eat meals. Patient either slept or read for most of the day but around 14:30 patient came out of her room and read in the lounge after staff prompted her. Patient is A&O.

## 2019-12-21 NOTE — PLAN OF CARE
Problem: Depressive Symptoms  Goal: Depressive Symptoms  Description  Signs and symptoms of listed problems will be absent or manageable.  Flowsheets (Taken 12/21/2019 4985)  Depressive Symptoms Assessed: all  Depressive Symptoms Present: mood; affect; insight  Note:   Patient's vitals were WDL. Patient was medication compliant, had no medication changes, and took no PRNs. Patient still endorses suicidal ideation and states that her mood is poor. Patient reports high anxiety. Patient remained in bed for most of the shift but did get up to eat meals. Patient either slept or read for most of the day but around 14:30 patient came out of her room and read in the lounge after staff prompted her. Patient is A&O.

## 2019-12-21 NOTE — PLAN OF CARE
"Shift Update: Pt mood is flat and affect is depressed. Thought process is impaired, as is insight. Pt endorses suicidal ideation and states that if she was home she would \"take a bunch of pills\". Pt contracts for safety in the hospital. Pt stated that she is angry that she cant go home and \"get off the roller coaster\". Pt did take her zarina medication with much prompting. Later in the zarina, pt was heard crying in room by staff member. Pt was comforted. Pt is to remain out of her room for 4 hours on day and zarina shift.   "

## 2019-12-22 PROCEDURE — 25000132 ZZH RX MED GY IP 250 OP 250 PS 637: Performed by: PSYCHIATRY & NEUROLOGY

## 2019-12-22 PROCEDURE — 12400000 ZZH R&B MH

## 2019-12-22 RX ADMIN — VORTIOXETINE 20 MG: 20 TABLET, FILM COATED ORAL at 08:56

## 2019-12-22 RX ADMIN — BREXPIPRAZOLE 3 MG: 2 TABLET ORAL at 08:56

## 2019-12-22 RX ADMIN — MIRTAZAPINE 45 MG: 45 TABLET, FILM COATED ORAL at 21:02

## 2019-12-22 RX ADMIN — LAMOTRIGINE 200 MG: 100 TABLET ORAL at 08:56

## 2019-12-22 NOTE — PLAN OF CARE
Pt. spent time in [employer], reading.  Pt. called her older son and they had a good conversation.  Pt felt encouraged by talking with son.  Pt clipped nails, read her book in the [employer].  Pt expressed concern about ECT and not having control over what is happening to her. Pt pleasant and cooperative.

## 2019-12-22 NOTE — PLAN OF CARE
"Flat, blunt affect, states that she is doing \"ok\". Still endorses chronic suicidal thoughts, does contract for safety. Says that she does not want to do ECT again and is not hopeful about the future. Visible in lounge at meal times, but otherwise stayed in her room reading. Med compliant.  "

## 2019-12-23 PROCEDURE — 25000132 ZZH RX MED GY IP 250 OP 250 PS 637: Performed by: PSYCHIATRY & NEUROLOGY

## 2019-12-23 PROCEDURE — 12400000 ZZH R&B MH

## 2019-12-23 RX ADMIN — LAMOTRIGINE 200 MG: 100 TABLET ORAL at 08:52

## 2019-12-23 RX ADMIN — MIRTAZAPINE 45 MG: 45 TABLET, FILM COATED ORAL at 21:50

## 2019-12-23 RX ADMIN — BREXPIPRAZOLE 3 MG: 2 TABLET ORAL at 08:52

## 2019-12-23 RX ADMIN — VORTIOXETINE 20 MG: 20 TABLET, FILM COATED ORAL at 08:52

## 2019-12-23 ASSESSMENT — ACTIVITIES OF DAILY LIVING (ADL)
DRESS: INDEPENDENT
LAUNDRY: WITH SUPERVISION
ORAL_HYGIENE: INDEPENDENT
HYGIENE/GROOMING: INDEPENDENT
DRESS: INDEPENDENT
HYGIENE/GROOMING: INDEPENDENT;SHOWER
ORAL_HYGIENE: INDEPENDENT

## 2019-12-23 NOTE — PLAN OF CARE
BEHAVIORAL TEAM DISCUSSION    Participants: MD, RN's, CM, PA, OT   Progress: No change  Anticipated length of stay: TBD  Continued Stay Criteria/Rationale: Waiting for Robles Arie   Medical/Physical: n/a  Precautions:   Behavioral Orders   Procedures    Code 1 - Restrict to Unit    Discontinue 1:1 attendant for suicide risk     Order Specific Question:   I have performed an in person assessment of the patient     Answer:   Based on this assessment the patient no longer requires a one on one attendant at this point in time.    Routine Programming     As clinically indicated    Status 15     Every 15 minutes.     Plan: Further stabilization and waiting for Robles Arie   Rationale for change in precautions or plan: Patient is not stable.

## 2019-12-23 NOTE — PLAN OF CARE
"Pt has been reading self help book, \"Your Perfect Right.\"  Writer discussed assertiveness with Pt based upon Pt's questions. Pt received a call from her father this shift.  Pt felt frustrated and sad after the call.  Father said he intends to visit but did not commit to a date.  Pt shared she had an IEP (individualized education plan) from  through high school and still has difficulties with spelling. Pt also talked about being a very shy person throughout her life.  Pt considering reaching out to older sister who suggested that they participate in an Adult Children of Alcoholics program.    "

## 2019-12-23 NOTE — PROGRESS NOTES
[facility] Psychiatric Progress Note       Interim History     The patient's care was discussed with the treatment team and chart notes were reviewed. Pt seen on 12/20/19 by Dr. Madrigal. It should be noted that yesterday, court orders were received. As of 12/19/19, the patient is committed as MI to [facility] and [facility]. [place] for Vraylar, Rexulti, Latuda, and Abilify. Dr. Madrigal informs the patient about this, and offers the patient once again to receive ECT voluntarily.  Patient was able to have a conversation with Dr. Madrigal about her sister suggesting she go to [facility] for adult children of alcoholics.  Staff report patient likes to read we will suggest patient read a book on this subject.  Patient continues to refuse ECT Robles Andersen petition for forced ECT is pending.     Hospital Course   This is a 33 year old female with previous psychiatric diagnoses that include major depression, generalized anxiety, and traits of borderline personality disorder. She obtains four previous inpatient mental health hospitalizations, her most recent being less than 1 month ago here at [facility]. For current admission, the patient presented to [facility] from her outpatient psychiatric clinic due to suicidal ideation. The patient has experienced increased symptoms of depression and suicidal ideation within the past few weeks. Contemplated suicide by overdosing on her Remeron medication. It should be noted that the patient has stopped taking her psychiatric medications altogether. She was deemed appropriate for inpatient level of care for safety and further stabilization. While meeting with Dr. Madrigal this morning, petition for commitment and [place] were explained to patient. Dr. Madrigal believes this approach is necessary due to the patient being noncompliant with medications outside of the hospital setting, having numerous hospitalizations within the past year, and sabotaging aftercare treatment. Paperwork for  commitment will be filled out by Dr. Madrigal.   Patient has been committed Robles Ganesh petition is still pending for forced ECT.     Medications     Current Facility-Administered Medications Ordered in Epic   Medication Dose Route Frequency Last Rate Last Dose     brexpiprazole (REXULTI) tablet 3 mg  3 mg Oral Daily   3 mg at 12/23/19 0852     hydrOXYzine (ATARAX) tablet 25 mg  25 mg Oral TID PRN   25 mg at 12/20/19 2250     lamoTRIgine (LaMICtal) tablet 200 mg  200 mg Oral Daily   200 mg at 12/23/19 0852     mirtazapine (REMERON) tablet 45 mg  45 mg Oral At Bedtime   45 mg at 12/22/19 2102     vortioxetine (TRINTELLIX/BRINTELLIX) tablet 20 mg  20 mg Oral Daily   20 mg at 12/23/19 0852     No current [facility]-ordered outpatient medications on file.         Allergies      No Known Allergies     Medical Review of Systems     /76   Pulse 82   Temp 98  F (36.7  C) (Oral)   Resp 16   Wt 101.5 kg (223 lb 11.2 oz)   SpO2 96%   BMI 37.23 kg/m    Body mass index is 37.23 kg/m .  A 10-point review of systems was performed by Zhang Madrigal MD and is negative, no new findings.      Psychiatric Examination     Appearance Sitting in chair, dressed in hospital scrubs. Appears stated age.   Attitude Cooperative   Orientation Oriented to person, place, time   Eye Contact Poor   Speech Regular rate, rhythm, volume and tone   Language Normal   Psychomotor Behavior Normal   Mood Depressed   Affect Flat, quiet   Thought Process Goal-Oriented, Intact   Associations Intact   Thought Content Patient is currently positive for suicidal ideation, negative for plan or intent, able to contract no self harm and identify barriers to suicide.  Negative for obsessions, compulsions or psychosis.     Fund of Knowledge Intact   Insight Poor   Judgement Poor   Attention Span & Concentration Intact   Recent & Remote Memory Intact    Gait Normal   Muscle Tone Intact        Labs     Labs reviewed.  No results found for this or any  previous visit (from the past 24 hour(s)).     Impression   This is a 33 year old female with previous psychiatric diagnoses that include major depression, generalized anxiety, and traits of borderline personality disorder. She obtains four previous inpatient mental health hospitalizations, her most recent being less than 1 month ago here at [facility]. As of 12/19/19, the patient is committed as MI to [facility] and [facility]. [facility] for Vraylar, Rexulti, Latuda, and Abilify. The patient continues to deny ECT voluntarily. Dr. Madrigal will ask Dr. Larson for a second opinion so that the petition for authorization to impose ECT may be supported.      Diagnoses     1. Major depression, recurrent, severe, without psychotic features.  2. Generalized Anxiety Disorder  3. Traits of Borderline Personality Disorder     Plan     1. Explained side effects, benefits, and complications of medications to the patient, Pt gave verbal consent.  2. Medication changes: None.   3. Discussed treatment plan with patient and team.  4. Projected length of stay: 7+ days  5. Filed petition for authorization to impose ECT treatment   As of 12/19/19, the patient is committed as MI to [facility] and [facility]. [place] for Vraylar, Rexulti, Latuda, and Abilify.   6.   Travis Andersen filed petition for forced ECT still pending.  7.   Suggest patient start reading on Al-Anon for adult children of alcoholics    Attestation:   DEANA, Alessandra Chambers, am serving as a scribe on 12/20/2019 to document services personally performed by Zhang Madrigal MD based on my observations and the provider's statements to me.    Patient ID:  Name: Misty Parham    MRN: [id number]  Admission: 11/25/2019   YOB: 1985

## 2019-12-23 NOTE — PROGRESS NOTES
"FAITH:   D: Social work received the Examination and Hearing paperwork for Authorization to Impose Treatment Electroconvulsive Therapy which has been set for December 27th, 2019 at 10:15 and 11:00 a.m. respectively. FAITH signed and faxed back court paperwork to [facility] (Fax # [phone]) which is on the patients chart. A copy was given to and explained to the patient with patient stating understanding. Copy left for floyd Horowitz. This patient stated to writer that she has had ECT in the past and it has helped for a short period of time but then \"wears off\" and leaves patient feeling far worse. Discussed the possibility of maintenance ECT with patient and she stated that it is difficult for her to find a ride to and from.     ÓSCAR Chiu, [facility]  [phone]  "

## 2019-12-24 PROCEDURE — 12400000 ZZH R&B MH

## 2019-12-24 PROCEDURE — 25000132 ZZH RX MED GY IP 250 OP 250 PS 637: Performed by: PSYCHIATRY & NEUROLOGY

## 2019-12-24 RX ADMIN — MIRTAZAPINE 45 MG: 45 TABLET, FILM COATED ORAL at 20:57

## 2019-12-24 RX ADMIN — LAMOTRIGINE 200 MG: 100 TABLET ORAL at 08:54

## 2019-12-24 RX ADMIN — VORTIOXETINE 20 MG: 20 TABLET, FILM COATED ORAL at 08:54

## 2019-12-24 RX ADMIN — BREXPIPRAZOLE 3 MG: 2 TABLET ORAL at 08:54

## 2019-12-24 ASSESSMENT — ACTIVITIES OF DAILY LIVING (ADL)
DRESS: SCRUBS (BEHAVIORAL HEALTH);INDEPENDENT
LAUNDRY: UNABLE TO COMPLETE
ORAL_HYGIENE: INDEPENDENT
HYGIENE/GROOMING: INDEPENDENT

## 2019-12-24 NOTE — PROGRESS NOTES
[facility] Psychiatric Progress Note       Interim History     The patient's care was discussed with the treatment team and chart notes were reviewed. Pt seen on 12/20/19 by Dr. Madrigal. It should be noted that yesterday, court orders were received. As of 12/19/19, the patient is committed as MI to S and [facility]. [place] for Vraylar, Rexulti, Latuda, and Abilify. Dr. Madrigal informs the patient about this, and offers the patient once again to receive ECT voluntarily.  Patient was able to have a conversation with Dr. Madrigal about her sister suggesting she go to [facility] for adult children of alcoholics.  Staff report patient likes to read we will suggest patient read a book on this subject.  Patient continues to refuse ECT Robles Andersen petition for forced ECT is pending. nop changes pt still refusing to get ECT.      Hospital Course   This is a 33 year old female with previous psychiatric diagnoses that include major depression, generalized anxiety, and traits of borderline personality disorder. She obtains four previous inpatient mental health hospitalizations, her most recent being less than 1 month ago here at [facility]. For current admission, the patient presented to [facility] from her outpatient psychiatric clinic due to suicidal ideation. The patient has experienced increased symptoms of depression and suicidal ideation within the past few weeks. Contemplated suicide by overdosing on her Remeron medication. It should be noted that the patient has stopped taking her psychiatric medications altogether. She was deemed appropriate for inpatient level of care for safety and further stabilization. While meeting with Dr. Madrigal this morning, petition for commitment and [place] were explained to patient. Dr. Madrigal believes this approach is necessary due to the patient being noncompliant with medications outside of the hospital setting, having numerous hospitalizations within the past year, and  sabotaging aftercare treatment. Paperwork for commitment will be filled out by Dr. Madrigal.   Patient has been committed [facility] petition is still pending for forced ECT.     Medications     Current Facility-Administered Medications Ordered in Epic   Medication Dose Route Frequency Last Rate Last Dose     brexpiprazole (REXULTI) tablet 3 mg  3 mg Oral Daily   3 mg at 12/24/19 0854     hydrOXYzine (ATARAX) tablet 25 mg  25 mg Oral TID PRN   25 mg at 12/20/19 2250     lamoTRIgine (LaMICtal) tablet 200 mg  200 mg Oral Daily   200 mg at 12/24/19 0854     mirtazapine (REMERON) tablet 45 mg  45 mg Oral At Bedtime   45 mg at 12/23/19 2150     vortioxetine (TRINTELLIX/BRINTELLIX) tablet 20 mg  20 mg Oral Daily   20 mg at 12/24/19 0854     No current [facility]-ordered outpatient medications on file.         Allergies      No Known Allergies     Medical Review of Systems     /76   Pulse 80   Temp 97.9  F (36.6  C) (Oral)   Resp 16   Wt 101.2 kg (223 lb)   SpO2 97%   BMI 37.11 kg/m    Body mass index is 37.11 kg/m .  A 10-point review of systems was performed by Zhang Madrigal MD and is negative, no new findings.      Psychiatric Examination     Appearance Sitting in chair, dressed in hospital scrubs. Appears stated age.   Attitude Cooperative   Orientation Oriented to person, place, time   Eye Contact Poor   Speech Regular rate, rhythm, volume and tone   Language Normal   Psychomotor Behavior Normal   Mood Depressed   Affect Flat, quiet   Thought Process Goal-Oriented, Intact   Associations Intact   Thought Content Patient is currently positive for suicidal ideation, negative for plan or intent, able to contract no self harm and identify barriers to suicide.  Negative for obsessions, compulsions or psychosis.     Fund of Knowledge Intact   Insight Poor   Judgement Poor   Attention Span & Concentration Intact   Recent & Remote Memory Intact    Gait Normal   Muscle Tone Intact        Labs     Labs  reviewed.  No results found for this or any previous visit (from the past 24 hour(s)).     Impression   This is a 33 year old female with previous psychiatric diagnoses that include major depression, generalized anxiety, and traits of borderline personality disorder. She obtains four previous inpatient mental health hospitalizations, her most recent being less than 1 month ago here at [facility]. As of 12/19/19, the patient is committed as MI to S and [facility]. [place] for Vraylar, Rexulti, Latuda, and Abilify. The patient continues to deny ECT voluntarily. Dr. Madrigal will ask Dr. Larson for a second opinion so that the petition for authorization to impose ECT may be supported.      Diagnoses     1. Major depression, recurrent, severe, without psychotic features.  2. Generalized Anxiety Disorder  3. Traits of Borderline Personality Disorder     Plan     1. Explained side effects, benefits, and complications of medications to the patient, Pt gave verbal consent.  2. Medication changes: None.   3. Discussed treatment plan with patient and team.  4. Projected length of stay: 7+ days  5. Filed petition for authorization to impose ECT treatment   As of 12/19/19, the patient is committed as MI to [facility] and [facility]. [place] for Vraylar, Rexulti, Latuda, and Abilify.   6.   Travis Andersen filed petition for forced ECT still pending.  7.   Suggest patient start reading on Al-Anon for adult children of alcoholics    Attestation:   DEANA, Alessandra Chambers, am serving as a scribe on 12/20/2019 to document services personally performed by Zhang Madrigal MD based on my observations and the provider's statements to me.    Patient ID:  Name: Misty Parham    MRN: [id number]  Admission: 11/25/2019   YOB: 1985

## 2019-12-24 NOTE — PLAN OF CARE
"Blunt affect, reports feeling hopeless about the future. \"It's a waste of time to do ECT, I'll get better for 2 weeks and then feel the same way again.\" She reported that she had a dream last night that she had killed herself and was angry that she woke up and realized it that it was just a dream. Isolative and withdrawn, spent most of the shift in her room reading. Endorses chronic SI, contracts for safety in hospital. Med compliant.  "
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
## 2019-12-24 NOTE — PROGRESS NOTES
Newly assigned Manjeet Irvin CM:  Samreen Machuca at [phone].   She will come to meet the patient on Mon 12-30, late afternoon.

## 2019-12-25 PROCEDURE — 25000132 ZZH RX MED GY IP 250 OP 250 PS 637: Performed by: PSYCHIATRY & NEUROLOGY

## 2019-12-25 PROCEDURE — 12400000 ZZH R&B MH

## 2019-12-25 RX ADMIN — LAMOTRIGINE 200 MG: 100 TABLET ORAL at 09:25

## 2019-12-25 RX ADMIN — MIRTAZAPINE 45 MG: 45 TABLET, FILM COATED ORAL at 20:52

## 2019-12-25 RX ADMIN — VORTIOXETINE 20 MG: 20 TABLET, FILM COATED ORAL at 09:25

## 2019-12-25 RX ADMIN — BREXPIPRAZOLE 3 MG: 2 TABLET ORAL at 09:25

## 2019-12-25 ASSESSMENT — ACTIVITIES OF DAILY LIVING (ADL)
ORAL_HYGIENE: INDEPENDENT
HYGIENE/GROOMING: INDEPENDENT
DRESS: INDEPENDENT
LAUNDRY: WITH SUPERVISION

## 2019-12-25 NOTE — PLAN OF CARE
"Flat affect, endorses depression and hopelessness. Reports that she is thinking about the court hearing this Friday and is angry that she might have to have it done again. She says that she doesn't think that she needs her \"brain fried\" for a third time. \"I don't want to keep losing my memory and obviously it didn't work well last time. \"I have just given up, I don't want to be on the roller coaster anymore.\" Writer asked if she had thought about the impact on her children and she said \"that's why I feel sorry for them.\" Isolative and withdrawn most of shift reading in her room, only out for meals and to take a phone call. Endorses SI, contracts for safety while in hospital. Med compliant  "

## 2019-12-25 NOTE — PROGRESS NOTES
Pt has been isolative and withdrawn in her room throughout the evening shift. Presents a flat, sad and depressed affect; remains collected. No shower; neglected grooming but attire is appropriate to age and situation. No groups; remains in her room. Respectful to peers and staff. Mood is calm.

## 2019-12-26 PROCEDURE — 25000132 ZZH RX MED GY IP 250 OP 250 PS 637: Performed by: PSYCHIATRY & NEUROLOGY

## 2019-12-26 PROCEDURE — 12400000 ZZH R&B MH

## 2019-12-26 RX ADMIN — LAMOTRIGINE 200 MG: 100 TABLET ORAL at 09:13

## 2019-12-26 RX ADMIN — MIRTAZAPINE 45 MG: 45 TABLET, FILM COATED ORAL at 20:55

## 2019-12-26 RX ADMIN — BREXPIPRAZOLE 3 MG: 2 TABLET ORAL at 09:12

## 2019-12-26 RX ADMIN — VORTIOXETINE 20 MG: 20 TABLET, FILM COATED ORAL at 09:13

## 2019-12-26 ASSESSMENT — ACTIVITIES OF DAILY LIVING (ADL)
DRESS: INDEPENDENT
LAUNDRY: WITH SUPERVISION
ORAL_HYGIENE: INDEPENDENT
HYGIENE/GROOMING: INDEPENDENT

## 2019-12-26 NOTE — PROGRESS NOTES
[facility] Psychiatric Progress Note       Interim History     The patient's care was discussed with the treatment team and chart notes were reviewed. Pt seen on 12/26/19 by Dr. Madrigal.nena Madrigal informs the patient about this, and offers the patient once again to receive ECT voluntarily.  Patient was able to have a conversation with Dr. Madrigal about her sister suggesting she go to [facility] for adult children of alcoholics.  Staff report patient likes to read we will suggest patient read a book on this subject.  Patient continues to refuse ECT Travis Andersen petition for forced ECT is pending. nop changes pt still refusing to get ECT.        Hospital Course   This is a 33 year old female with previous psychiatric diagnoses that include major depression, generalized anxiety, and traits of borderline personality disorder. She obtains four previous inpatient mental health hospitalizations, her most recent being less than 1 month ago here at [facility]. For current admission, the patient presented to [facility] from her outpatient psychiatric clinic due to suicidal ideation. The patient has experienced increased symptoms of depression and suicidal ideation within the past few weeks. Contemplated suicide by overdosing on her Remeron medication. It should be noted that the patient has stopped taking her psychiatric medications altogether. She was deemed appropriate for inpatient level of care for safety and further stabilization. While meeting with Dr. Madrigal this morning, petition for commitment and [facility] were explained to patient. Dr. Madrigal believes this approach is necessary due to the patient being noncompliant with medications outside of the hospital setting, having numerous hospitalizations within the past year, and sabotaging aftercare treatment. Paperwork for commitment will be filled out by Dr. Madrigal.   Patient has been committed Travis Andersen petition is still pending for forced  ECT.     Medications     Current Facility-Administered Medications Ordered in Epic   Medication Dose Route Frequency Last Rate Last Dose     brexpiprazole (REXULTI) tablet 3 mg  3 mg Oral Daily   3 mg at 12/26/19 0912     hydrOXYzine (ATARAX) tablet 25 mg  25 mg Oral TID PRN   25 mg at 12/20/19 2250     lamoTRIgine (LaMICtal) tablet 200 mg  200 mg Oral Daily   200 mg at 12/26/19 0913     mirtazapine (REMERON) tablet 45 mg  45 mg Oral At Bedtime   45 mg at 12/25/19 2052     vortioxetine (TRINTELLIX/BRINTELLIX) tablet 20 mg  20 mg Oral Daily   20 mg at 12/26/19 0913     No current [facility]-ordered outpatient medications on file.         Allergies      No Known Allergies     Medical Review of Systems     /73   Pulse 82   Temp 97.9  F (36.6  C) (Oral)   Resp 16   Wt 101.2 kg (223 lb)   SpO2 97%   BMI 37.11 kg/m    Body mass index is 37.11 kg/m .  A 10-point review of systems was performed by Zhang Madrigal MD and is negative, no new findings.      Psychiatric Examination     Appearance Sitting in chair, dressed in hospital scrubs. Appears stated age.   Attitude Cooperative   Orientation Oriented to person, place, time   Eye Contact Poor   Speech Regular rate, rhythm, volume and tone   Language Normal   Psychomotor Behavior Normal   Mood Depressed   Affect Flat, quiet   Thought Process Goal-Oriented, Intact   Associations Intact   Thought Content Patient is currently positive for suicidal ideation, negative for plan or intent, able to contract no self harm and identify barriers to suicide.  Negative for obsessions, compulsions or psychosis.     Fund of Knowledge Intact   Insight Poor   Judgement Poor   Attention Span & Concentration Intact   Recent & Remote Memory Intact    Gait Normal   Muscle Tone Intact        Labs     Labs reviewed.  No results found for this or any previous visit (from the past 24 hour(s)).     Impression   This is a 33 year old female with previous psychiatric diagnoses that  include major depression, generalized anxiety, and traits of borderline personality disorder. She obtains four previous inpatient mental health hospitalizations, her most recent being less than 1 month ago here at [facility]. As of 12/19/19, the patient is committed as MI to [facility]. [facility] for Vraylar, Rexulti, Latuda, and Abilify. The patient continues to deny ECT voluntarily. Dr. Madrigal will ask Dr. Larson for a second opinion so that the petition for authorization to impose ECT may be supported.      Diagnoses     1. Major depression, recurrent, severe, without psychotic features.  2. Generalized Anxiety Disorder  3. Traits of Borderline Personality Disorder     Plan     1. Explained side effects, benefits, and complications of medications to the patient, Pt gave verbal consent.  2. Medication changes: None.   3. Discussed treatment plan with patient and team.  4. Projected length of stay: 7+ days  5. Filed petition for authorization to impose ECT treatment   As of 12/19/19, the patient is committed as MI to [facility] and [facility]. [facility] for Vraylar, Rexulti, Latuda, and Abilify.   6.   Travis Andersen filed petition for forced ECT still pending.  7.   Suggest patient start reading on Al-Anon for adult children of alcoholics    Attestation:   I, Alessandra Chambers, am serving as a scribe on 12/20/2019 to document services personally performed by Zhang Madrigal MD based on my observations and the provider's statements to me.    Patient ID:  Name: Misty Pahram    MRN: [id number]  Admission: 11/25/2019   YOB: 1985

## 2019-12-26 NOTE — PLAN OF CARE
Pt presents a flat blunt affect, pt did not attend groups and spent majority of the shift in her room. On a 1-1 pt stated how she is angry about court tomorrow, staff talk about pt chronic SI. Pt was able to agree to come out of her room later on in the day. After talking to staff about her chronic thoughts, pt asked for her kids picture. Pt was med compliant.

## 2019-12-26 NOTE — PLAN OF CARE
"  Problem: Suicidal Behavior  Goal: Suicidal Behavior is Absent or Managed  12/25/2019 1939 by Saad Carbone RN  Flowsheets (Taken 12/25/2019 1939)  Mutually Determined Action Steps (Provide Immediate/Ongoing Protective Physical Environment): shares suicidal thoughts  Note:   Pt spoke with staff with staff about her plan and how she feels that nothing is going to work. Pt is frustrated with the whole treatment plan stating that \"I don't wan to deal with being here and they are forcing me to get treatment.\" Pt was encouraged to reframe her thinking and use her coping skills. Pt stated \"nothing works for me and that I would rather do nothing then have my brain fired again.\" Pt does acknowledge that she did well for a period of time after ECT but feels like after ECT her depression has gotten worse and less manageable. Pt is blunt and flat. Pt feels hopeless. Pt continues to have suicidal thoughts with no stated plan. Pt has not had any SIB this shift. Nursing to continue to monitor.   "

## 2019-12-27 PROCEDURE — 25000132 ZZH RX MED GY IP 250 OP 250 PS 637: Performed by: PSYCHIATRY & NEUROLOGY

## 2019-12-27 PROCEDURE — 12400000 ZZH R&B MH

## 2019-12-27 RX ADMIN — MIRTAZAPINE 45 MG: 45 TABLET, FILM COATED ORAL at 21:05

## 2019-12-27 RX ADMIN — BREXPIPRAZOLE 3 MG: 2 TABLET ORAL at 08:48

## 2019-12-27 RX ADMIN — LAMOTRIGINE 200 MG: 100 TABLET ORAL at 08:48

## 2019-12-27 RX ADMIN — HYDROXYZINE HYDROCHLORIDE 25 MG: 25 TABLET, FILM COATED ORAL at 08:48

## 2019-12-27 RX ADMIN — VORTIOXETINE 20 MG: 20 TABLET, FILM COATED ORAL at 08:48

## 2019-12-27 ASSESSMENT — ACTIVITIES OF DAILY LIVING (ADL)
ORAL_HYGIENE: INDEPENDENT
HYGIENE/GROOMING: INDEPENDENT
HYGIENE/GROOMING: INDEPENDENT
LAUNDRY: WITH SUPERVISION
DRESS: INDEPENDENT
LAUNDRY: WITH SUPERVISION
DRESS: INDEPENDENT
ORAL_HYGIENE: INDEPENDENT

## 2019-12-27 NOTE — PLAN OF CARE
Pt presents and depressed and anxious and flat affect. Pt arrived back from court around 1630 and stated she had had a long day and was tired and bored. Thought process and insight impaired; still states she wants to leave. Endorses SI and states she would like to overdose, but contracts for safety. Spent majority of shift in bed resting. No groups. Med compliant.

## 2019-12-27 NOTE — PROGRESS NOTES
[facility] Psychiatric Progress Note       Interim History     The patient's care was discussed with the treatment team and chart notes were reviewed. Pt seen on 12/27/19 by Dr. Madrigal.nena Madrigal informs the patient about this, and offers the patient once again to receive ECT voluntarily  Patient continues to refuse ECT Travis Andersen petition for forced ECT is pending. nop changes pt still refusing to get ECT. Pt continues to insist ECT has never helped. Despite all providers out patient and inpatient.       Hospital Course   This is a 33 year old female with previous psychiatric diagnoses that include major depression, generalized anxiety, and traits of borderline personality disorder. She obtains four previous inpatient mental health hospitalizations, her most recent being less than 1 month ago here at [facility]. For current admission, the patient presented to [facility] from her outpatient psychiatric clinic due to suicidal ideation. The patient has experienced increased symptoms of depression and suicidal ideation within the past few weeks. Contemplated suicide by overdosing on her Remeron medication. It should be noted that the patient has stopped taking her psychiatric medications altogether. She was deemed appropriate for inpatient level of care for safety and further stabilization. While meeting with Dr. Madrigal this morning, petition for commitment and [facility] were explained to patient. Dr. Madrigal believes this approach is necessary due to the patient being noncompliant with medications outside of the hospital setting, having numerous hospitalizations within the past year, and sabotaging aftercare treatment. Paperwork for commitment will be filled out by Dr. Madrigal.   Patient has been committed Travis Andersen petition is still pending for forced ECT.     Medications     Current Facility-Administered Medications Ordered in Epic   Medication Dose Route Frequency Last Rate Last Dose      brexpiprazole (REXULTI) tablet 3 mg  3 mg Oral Daily   3 mg at 12/27/19 0848     hydrOXYzine (ATARAX) tablet 25 mg  25 mg Oral TID PRN   25 mg at 12/27/19 0848     lamoTRIgine (LaMICtal) tablet 200 mg  200 mg Oral Daily   200 mg at 12/27/19 0848     mirtazapine (REMERON) tablet 45 mg  45 mg Oral At Bedtime   45 mg at 12/26/19 2055     vortioxetine (TRINTELLIX/BRINTELLIX) tablet 20 mg  20 mg Oral Daily   20 mg at 12/27/19 0848     No current Epic-ordered outpatient medications on file.         Allergies      No Known Allergies     Medical Review of Systems     /79   Pulse 84   Temp 98.3  F (36.8  C) (Oral)   Resp 16   Wt 101.2 kg (223 lb)   SpO2 98%   BMI 37.11 kg/m    Body mass index is 37.11 kg/m .  A 10-point review of systems was performed by Zhang Madrigal MD and is negative, no new findings.      Psychiatric Examination     Appearance Sitting in chair, dressed in hospital scrubs. Appears stated age.   Attitude Cooperative   Orientation Oriented to person, place, time   Eye Contact Poor   Speech Regular rate, rhythm, volume and tone   Language Normal   Psychomotor Behavior Normal   Mood Depressed   Affect Flat, quiet   Thought Process Goal-Oriented, Intact   Associations Intact   Thought Content Patient is currently positive for suicidal ideation, negative for plan or intent, able to contract no self harm and identify barriers to suicide.  Negative for obsessions, compulsions or psychosis.     Fund of Knowledge Intact   Insight Poor   Judgement Poor   Attention Span & Concentration Intact   Recent & Remote Memory Intact    Gait Normal   Muscle Tone Intact        Labs     Labs reviewed.  No results found for this or any previous visit (from the past 24 hour(s)).     Impression   This is a 33 year old female with previous psychiatric diagnoses that include major depression, generalized anxiety, and traits of borderline personality disorder. She obtains four previous inpatient mental health  hospitalizations, her most recent being less than 1 month ago here at [facility]. As of 12/19/19, the patient is committed as MI to S and [facility]. [facility] for Vraylar, Rexulti, Latuda, and Abilify. The patient continues to deny ECT voluntarily. Dr. Madrigal will ask Dr. Larson for a second opinion so that the petition for authorization to impose ECT may be supported.      Diagnoses     1. Major depression, recurrent, severe, without psychotic features.  2. Generalized Anxiety Disorder  3. Traits of Borderline Personality Disorder     Plan     1. Explained side effects, benefits, and complications of medications to the patient, Pt gave verbal consent.  2. Medication changes: None.   3. Discussed treatment plan with patient and team.  4. Projected length of stay: 7+ days  5. Filed petition for authorization to impose ECT treatment   As of 12/19/19, the patient is committed as MI to S and [facility]. [facility] for Vraylar, Rexulti, Latuda, and Abilify.   6.   Travis Andersen filed petition for forced ECT still pending.  7.   Suggest patient start reading on Al-Anon for adult children of alcoholics    Attestation:   I, Alessandra Chambers, am serving as a scribe on 12/20/2019 to document services personally performed by Zhang Madrigal MD based on my observations and the provider's statements to me.    Patient ID:  Name: Misty Parham    MRN: [id number]  Admission: 11/25/2019   YOB: 1985

## 2019-12-27 NOTE — PLAN OF CARE
"Isolative and withdrawn.  Spent much of the shift laying in bed and reading.  Declined groups.  Pt reports that she is frustrated with having to go to court tomorrow.  States that she does not want ECT because she has done it before and does not find it helpful.  States that she just wants to go home.  Denied any physical complaints.  Said her mood is \"Asiya flat.\"  Flat and depressed.  Endorses SI with a plan to overdose.  Verbally contracts for safety while on the unit.  Reports low energy and is uncertain what she thinks would help.  Continue treatment plan.  "

## 2019-12-27 NOTE — PLAN OF CARE
Problem: Depressive Symptoms  Goal: Depressive Symptoms  Description  Signs and symptoms of listed problems will be absent or manageable.  Flowsheets (Taken 12/27/2019 1401)  Depressive Symptoms Assessed: all  Depressive Symptoms Present: mood;affect;insight;suicidality  Note:   Patient's vitals were WNL. Patient took PRN Atarax 25(mg) at 08:49 for anxiety. Patient had no medication changes and was medication compliant. Patient went to court around 09:00. Patient appears depressed and anxious. Patient endorses suicidal ideation with plans to overdose. Patient contracts for safety. Before Patient left for her court hearing, Patient spent most of the shift reading.

## 2019-12-28 PROCEDURE — 25000132 ZZH RX MED GY IP 250 OP 250 PS 637: Performed by: PSYCHIATRY & NEUROLOGY

## 2019-12-28 PROCEDURE — 12400000 ZZH R&B MH

## 2019-12-28 RX ADMIN — LAMOTRIGINE 200 MG: 100 TABLET ORAL at 09:01

## 2019-12-28 RX ADMIN — MIRTAZAPINE 45 MG: 45 TABLET, FILM COATED ORAL at 21:19

## 2019-12-28 RX ADMIN — VORTIOXETINE 20 MG: 20 TABLET, FILM COATED ORAL at 09:01

## 2019-12-28 RX ADMIN — BREXPIPRAZOLE 3 MG: 2 TABLET ORAL at 09:01

## 2019-12-28 ASSESSMENT — ACTIVITIES OF DAILY LIVING (ADL)
DRESS: INDEPENDENT
ORAL_HYGIENE: INDEPENDENT
LAUNDRY: WITH SUPERVISION
HYGIENE/GROOMING: INDEPENDENT

## 2019-12-28 NOTE — PLAN OF CARE
"Pt presents with blunted, flat mood with depressed and hopeless affect. Thought process is impaired, judgement is poor. Isolative and withdrawn, behavior appropriate with peers and staff. Pt says court did not go well yesterday and that they want her to do ECT and she does not want to, even though she admits to it helping for a short time in the past. Says she wishes she was at home. Pt endorses thoughts of SI with no plan. Made a statement \"I wish I wouldn't have cut my nails because all I want to do right now is dig them in my skin\". Pt contracts for safety in hospital.  "

## 2019-12-29 PROCEDURE — 12400000 ZZH R&B MH

## 2019-12-29 PROCEDURE — 25000132 ZZH RX MED GY IP 250 OP 250 PS 637: Performed by: PSYCHIATRY & NEUROLOGY

## 2019-12-29 RX ADMIN — VORTIOXETINE 20 MG: 20 TABLET, FILM COATED ORAL at 09:26

## 2019-12-29 RX ADMIN — LAMOTRIGINE 200 MG: 100 TABLET ORAL at 09:26

## 2019-12-29 RX ADMIN — BREXPIPRAZOLE 3 MG: 2 TABLET ORAL at 09:26

## 2019-12-29 RX ADMIN — MIRTAZAPINE 45 MG: 45 TABLET, FILM COATED ORAL at 21:31

## 2019-12-29 ASSESSMENT — ACTIVITIES OF DAILY LIVING (ADL)
LAUNDRY: UNABLE TO COMPLETE
ORAL_HYGIENE: PROMPTS
HYGIENE/GROOMING: PROMPTS
DRESS: PROMPTS

## 2019-12-29 NOTE — PLAN OF CARE
"Pt presents with flat blunt sad affect and depressed mood. Pt was isolative and withdrawn and is upset about being  court ordered to undergo ECT treatment. Pt feels a \"tug of war between death and her kids.\" States she will try and call her kids today. Continue to prompt for hygiene and unit programming. Med compliant, no SI stated.   "

## 2019-12-29 NOTE — PLAN OF CARE
Pt spent all shift in her room bed resting or reading a book. She only came out of her room to take a phone call and to eat dinner in the lounge. Pt did not attend any groups and did not socialized with her peers. Pt has a flat affect and appears depressed in mood.

## 2019-12-30 PROCEDURE — 12400000 ZZH R&B MH

## 2019-12-30 PROCEDURE — 25000132 ZZH RX MED GY IP 250 OP 250 PS 637: Performed by: PSYCHIATRY & NEUROLOGY

## 2019-12-30 RX ADMIN — BREXPIPRAZOLE 3 MG: 2 TABLET ORAL at 09:34

## 2019-12-30 RX ADMIN — LAMOTRIGINE 200 MG: 100 TABLET ORAL at 09:34

## 2019-12-30 RX ADMIN — MIRTAZAPINE 45 MG: 45 TABLET, FILM COATED ORAL at 21:20

## 2019-12-30 RX ADMIN — VORTIOXETINE 20 MG: 20 TABLET, FILM COATED ORAL at 09:34

## 2019-12-30 ASSESSMENT — ACTIVITIES OF DAILY LIVING (ADL)
ORAL_HYGIENE: INDEPENDENT;PROMPTS
LAUNDRY: WITH SUPERVISION
DRESS: PROMPTS
HYGIENE/GROOMING: PROMPTS

## 2019-12-30 NOTE — PLAN OF CARE
Pt presents with sad affect and depressed mood. Pt is very cooperative with staff and retrieves meals without prompts but other than that continues to isolate herself in her room. Med compliant, no SI stated.

## 2019-12-30 NOTE — PLAN OF CARE
BEHAVIORAL TEAM DISCUSSION    Participants: MD, RN, CM, PA, OT   Progress: No change  Anticipated length of stay: Unknown. Patient under civil commitment in [facility].   Continued Stay Criteria/Rationale: Continued psychiatric stabilization  Medical/Physical: Her hospitalist consult  Precautions:   Behavioral Orders   Procedures    Code 1 - Restrict to Unit    Discontinue 1:1 attendant for suicide risk     Order Specific Question:   I have performed an in person assessment of the patient     Answer:   Based on this assessment the patient no longer requires a one on one attendant at this point in time.    Routine Programming     As clinically indicated    Status 15     Every 15 minutes.     Plan: Court order for Travis Sr received today. Continue current medications.   Rationale for change in precautions or plan: N/A

## 2019-12-30 NOTE — PROGRESS NOTES
[facility] Psychiatric Progress Note       Interim History     The patient's care was discussed with the treatment team and chart notes were reviewed. Pt seen on 12/27/19 by Dr. Madrigal.nena Madrigal informs the patient about this, and offers the patient once again to receive ECT voluntarily  Patient continues to refuse ECT Travis Andersen petition for forced ECT is pending. no changes pt still refusing to get ECT. Pt continues to insist ECT has never helped. Despite all providers out patient and inpatient.       Hospital Course   This is a 33 year old female with previous psychiatric diagnoses that include major depression, generalized anxiety, and traits of borderline personality disorder. She obtains four previous inpatient mental health hospitalizations, her most recent being less than 1 month ago here at [facility]. For current admission, the patient presented to [facility] from her outpatient psychiatric clinic due to suicidal ideation. The patient has experienced increased symptoms of depression and suicidal ideation within the past few weeks. Contemplated suicide by overdosing on her Remeron medication. It should be noted that the patient has stopped taking her psychiatric medications altogether. She was deemed appropriate for inpatient level of care for safety and further stabilization. While meeting with Dr. Madrigal this morning, petition for commitment and [place] were explained to patient. Dr. Madrigal believes this approach is necessary due to the patient being noncompliant with medications outside of the hospital setting, having numerous hospitalizations within the past year, and sabotaging aftercare treatment. Paperwork for commitment will be filled out by Dr. Madrigal.   Patient has been committed Travis Andersen petition is still pending for forced ECT.     Medications     Current Facility-Administered Medications Ordered in Epic   Medication Dose Route Frequency Last Rate Last Dose      brexpiprazole (REXULTI) tablet 3 mg  3 mg Oral Daily   3 mg at 12/29/19 0926     hydrOXYzine (ATARAX) tablet 25 mg  25 mg Oral TID PRN   25 mg at 12/27/19 0848     lamoTRIgine (LaMICtal) tablet 200 mg  200 mg Oral Daily   200 mg at 12/29/19 0926     mirtazapine (REMERON) tablet 45 mg  45 mg Oral At Bedtime   45 mg at 12/29/19 2131     vortioxetine (TRINTELLIX/BRINTELLIX) tablet 20 mg  20 mg Oral Daily   20 mg at 12/29/19 0926     No current Epic-ordered outpatient medications on file.         Allergies      No Known Allergies     Medical Review of Systems     BP 99/65   Pulse 68   Temp 98  F (36.7  C) (Oral)   Resp 16   Wt 101.2 kg (223 lb)   SpO2 97%   BMI 37.11 kg/m    Body mass index is 37.11 kg/m .  A 10-point review of systems was performed by Zhang Madrigal MD and is negative, no new findings.      Psychiatric Examination     Appearance Sitting in chair, dressed in hospital scrubs. Appears stated age.   Attitude Cooperative   Orientation Oriented to person, place, time   Eye Contact Poor   Speech Regular rate, rhythm, volume and tone   Language Normal   Psychomotor Behavior Normal   Mood Depressed   Affect Flat, quiet   Thought Process Goal-Oriented, Intact   Associations Intact   Thought Content Patient is currently positive for suicidal ideation, negative for plan or intent, able to contract no self harm and identify barriers to suicide.  Negative for obsessions, compulsions or psychosis.     Fund of Knowledge Intact   Insight Poor   Judgement Poor   Attention Span & Concentration Intact   Recent & Remote Memory Intact    Gait Normal   Muscle Tone Intact        Labs     Labs reviewed.  No results found for this or any previous visit (from the past 24 hour(s)).     Impression   This is a 33 year old female with previous psychiatric diagnoses that include major depression, generalized anxiety, and traits of borderline personality disorder. She obtains four previous inpatient mental health  hospitalizations, her most recent being less than 1 month ago here at [facility]. As of 12/19/19, the patient is committed as MI to S and [facility]. [place] for Vraylar, Rexulti, Latuda, and Abilify. The patient continues to deny ECT voluntarily. Dr. Madrigal will ask Dr. Larson for a second opinion so that the petition for authorization to impose ECT may be supported.      Diagnoses     1. Major depression, recurrent, severe, without psychotic features.  2. Generalized Anxiety Disorder  3. Traits of Borderline Personality Disorder     Plan     1. Explained side effects, benefits, and complications of medications to the patient, Pt gave verbal consent.  2. Medication changes: None.   3. Discussed treatment plan with patient and team.  4. Projected length of stay: 7+ days  5. Filed petition for authorization to impose ECT treatment   As of 12/19/19, the patient is committed as MI to S and [facility]. [place] for Vraylar, Rexulti, Latuda, and Abilify.   6.   Travis Andersen filed petition for forced ECT still pending.  7.   Suggest patient start reading on Al-Anon for adult children of alcoholics    Attestation:   I, Alessandra Chambers, am serving as a scribe on 12/20/2019 to document services personally performed by Zhang Madrigal MD based on my observations and the provider's statements to me.    Patient ID:  Name: Misty Parham    MRN: [id number]  Admission: 11/25/2019   YOB: 1985

## 2019-12-30 NOTE — PLAN OF CARE
"Pt reports her SI \"comes and goes.\" She reports she is angry about possibly having to have ECT as she states she feels it is not going to help as she has already had it twice before. She reports she is waiting for the decision from the  and states she is \"going crazy thinking about it.\" \"She presents with a flat affect, is withdrawn and isolative, appears depressed in mood. Pt showered this evening and changed into clean scrubs. She did not attend any groups, did not socialize with peers and only interacts with staff when prompted. Pt spent time bed resting and reading a book in her room.   "

## 2019-12-30 NOTE — PROGRESS NOTES
Court order related to the Robles-Sr filing received from [facility] Court today.  [facility] is authorized to administer up to three ECT treatments for up to two weeks per the court order in addition to maintenance treatments.  called Dr. Zhang Madrigal to notify him that the court order was received today.  met with patient this afternoon and gave her a copy of the court order. Patient did not have any questions at this time. She was encouraged to discuss this court order with the psychiatrist in the morning. A copy of the court order was placed in patient's chart as well.     Samreen CANTRELL, [facility] ([phone] / Cell: [phone] / Fax: [phone]), patient's newly assigned, court appointed, mental health  through [facility], came to the hospital today to meet with patient.  updated her on the Robles Sr order which was just received today.

## 2019-12-31 PROCEDURE — 93005 ELECTROCARDIOGRAM TRACING: CPT

## 2019-12-31 PROCEDURE — 12400000 ZZH R&B MH

## 2019-12-31 PROCEDURE — 25000132 ZZH RX MED GY IP 250 OP 250 PS 637: Performed by: PSYCHIATRY & NEUROLOGY

## 2019-12-31 PROCEDURE — 93010 ELECTROCARDIOGRAM REPORT: CPT | Performed by: INTERNAL MEDICINE

## 2019-12-31 RX ADMIN — BREXPIPRAZOLE 3 MG: 2 TABLET ORAL at 09:00

## 2019-12-31 RX ADMIN — VORTIOXETINE 20 MG: 20 TABLET, FILM COATED ORAL at 09:00

## 2019-12-31 RX ADMIN — LAMOTRIGINE 200 MG: 100 TABLET ORAL at 09:00

## 2019-12-31 RX ADMIN — MIRTAZAPINE 45 MG: 45 TABLET, FILM COATED ORAL at 20:31

## 2019-12-31 ASSESSMENT — ACTIVITIES OF DAILY LIVING (ADL)
DRESS: STREET CLOTHES
LAUNDRY: WITH SUPERVISION
DRESS: INDEPENDENT
HYGIENE/GROOMING: INDEPENDENT
HYGIENE/GROOMING: INDEPENDENT;PROMPTS
ORAL_HYGIENE: INDEPENDENT
ORAL_HYGIENE: INDEPENDENT
LAUNDRY: WITH SUPERVISION

## 2019-12-31 NOTE — PROGRESS NOTES
[facility] Psychiatric Progress Note       Interim History     The patient's care was discussed with the treatment team and chart notes were reviewed. Pt seen on 12/27/19 by Dr. Madrigal.nena Madrigal informs the patient about this, and offers the patient once again to receive ECT voluntarily  Patient continues to refuse ECT Robles Andersen petition for forced ECT has been approved by Commitment board so she is to start having ECT this coming Friday the 2nd. No changes pt still refusing to get ECT. Pt continues to insist ECT has never helped. Despite all providers out patient and inpatient.       Hospital Course   This is a 33 year old female with previous psychiatric diagnoses that include major depression, generalized anxiety, and traits of borderline personality disorder. She obtains four previous inpatient mental health hospitalizations, her most recent being less than 1 month ago here at [facility]. For current admission, the patient presented to [facility] from her outpatient psychiatric clinic due to suicidal ideation. The patient has experienced increased symptoms of depression and suicidal ideation within the past few weeks. Contemplated suicide by overdosing on her Remeron medication. It should be noted that the patient has stopped taking her psychiatric medications altogether. She was deemed appropriate for inpatient level of care for safety and further stabilization. While meeting with Dr. Madrigal this morning, petition for commitment and [place] were explained to patient. Dr. Madrigal believes this approach is necessary due to the patient being noncompliant with medications outside of the hospital setting, having numerous hospitalizations within the past year, and sabotaging aftercare treatment. Paperwork for commitment will be filled out by Dr. Madrigal.   Patient has been committed Travis Andersen petition is still pending for forced ECT.     Medications     Current Facility-Administered  Medications Ordered in Epic   Medication Dose Route Frequency Last Rate Last Dose     brexpiprazole (REXULTI) tablet 3 mg  3 mg Oral Daily   3 mg at 12/31/19 0900     hydrOXYzine (ATARAX) tablet 25 mg  25 mg Oral TID PRN   25 mg at 12/27/19 0848     lamoTRIgine (LaMICtal) tablet 200 mg  200 mg Oral Daily   200 mg at 12/31/19 0900     mirtazapine (REMERON) tablet 45 mg  45 mg Oral At Bedtime   45 mg at 12/30/19 2120     vortioxetine (TRINTELLIX/BRINTELLIX) tablet 20 mg  20 mg Oral Daily   20 mg at 12/31/19 0900     No current [facility]-ordered outpatient medications on file.         Allergies      No Known Allergies     Medical Review of Systems     /66   Pulse 68   Temp 97.9  F (36.6  C) (Oral)   Resp 16   Wt 100.6 kg (221 lb 12.8 oz)   SpO2 96%   BMI 36.91 kg/m    Body mass index is 36.91 kg/m .  A 10-point review of systems was performed by Zhang Madrigal MD and is negative, no new findings.      Psychiatric Examination     Appearance Sitting in chair, dressed in hospital scrubs. Appears stated age.   Attitude Cooperative   Orientation Oriented to person, place, time   Eye Contact Poor   Speech Regular rate, rhythm, volume and tone   Language Normal   Psychomotor Behavior Normal   Mood Depressed   Affect Flat, quiet   Thought Process Goal-Oriented, Intact   Associations Intact   Thought Content Patient is currently positive for suicidal ideation, negative for plan or intent, able to contract no self harm and identify barriers to suicide.  Negative for obsessions, compulsions or psychosis.     Fund of Knowledge Intact   Insight Poor   Judgement Poor   Attention Span & Concentration Intact   Recent & Remote Memory Intact    Gait Normal   Muscle Tone Intact        Labs     Labs reviewed.  No results found for this or any previous visit (from the past 24 hour(s)).     Impression   This is a 33 year old female with previous psychiatric diagnoses that include major depression, generalized anxiety, and traits  of borderline personality disorder. She obtains four previous inpatient mental health hospitalizations, her most recent being less than 1 month ago here at [facility]. As of 12/19/19, the patient is committed as MI to S and [facility]. [facility] for Vraylar, Rexulti, Latuda, and Abilify. The patient continues to deny ECT voluntarily. Dr. Madrigal will ask Dr. Larson for a second opinion so that the petition for authorization to impose ECT may be supported. Travis Andersen enforced by Court and she will need to get forced ECT. If she refuses than she needs to be restrained and given anesthesia and given Bilateral ECT.      Diagnoses     1. Major depression, recurrent, severe, without psychotic features.  2. Generalized Anxiety Disorder  3. Traits of Borderline Personality Disorder     Plan     1. Explained side effects, benefits, and complications of medications to the patient, Pt gave verbal consent.  2. Medication changes: None.   3. Discussed treatment plan with patient and team.  4. Projected length of stay: 7+ days  5. Filed petition for authorization to impose ECT treatment   As of 12/19/19, the patient is committed as MI to S and [facility]. [place] for Vraylar, Rexulti, Latuda, and Abilify.   6.   Travis Andersen filed petition for forced ECT still pending.  7.   Suggest patient start reading on Al-Anon for adult children of alcoholics    Attestation:   DEANA, Alessandra Chambers, am serving as a scribe on 12/20/2019 to document services personally performed by Zhang Madrigal MD based on my observations and the provider's statements to me.    Patient ID:  Name: Misty Parham    MRN: [id number]  Admission: 11/25/2019   YOB: 1985

## 2019-12-31 NOTE — PLAN OF CARE
Problem: Depressive Symptoms  Goal: Depressive Symptoms  Description  Signs and symptoms of listed problems will be absent or manageable.  Outcome: No Change  Flowsheets (Taken 12/31/2019 1304)  Depressive Symptoms Assessed: all  Depressive Symptoms Present: mood; affect; insight; suicidality; other (see comment)  Note:   Pt presents with a sad, flat affect and depressed mood. Pt continues to refuse groups despite prompts, and neglects her hygiene. Her insight is poor and judgement is impaired. Pt is appropriate on the unit but engages minimally in conversations with staff. She spent the majority of the shift reading in her room. Pt endorses chronic SI but with no plan, and is med-compliant. She continues to feel hopeless about her situation improving.

## 2019-12-31 NOTE — PLAN OF CARE
Pt presents with sad affect and depressed mood. Fluids pushed and pt engaged by staff due to high level of isolation. Pt was blunted and flat during all interactions and appeared to just want staff to leave her alone in her room. Pt prompted to participate in unit programming but declined. Hygiene not performed. Med compliant, no SI.

## 2019-12-31 NOTE — PROGRESS NOTES
Hospitalist note    Hospitalist service was asked to risk stratify this patient prior to undergoing ECT therapy.  I have reviewed my initial consultation note dated 11/26/2019.  I have confirmed urine pregnancy test was negative at the time of admission on 11/26/19.  Patient otherwise denies any PMH of diabetes, heart failure, valvular heart disease, asthma, chronic lung liver kidney disease or seizure disorder.  I reviewed her electrocardiogram dated 12/31/19 which showed normal sinus rhythm, normal axis, normal intervals, no ischemic changes.  -Patient may proceed with ECT therapy as planned by the primary psychiatric service.  -Hospital service will sign off.  Please reconsult us if there are any new acute medical issues that arise during patient's hospitalization    No charge note, I did not examine patient and we spoke briefly just to review her PMH.    Key Muse CNP  Hospitalist - [facility]  [phone]     Text Page

## 2020-01-01 PROCEDURE — 25000132 ZZH RX MED GY IP 250 OP 250 PS 637: Performed by: PSYCHIATRY & NEUROLOGY

## 2020-01-01 PROCEDURE — 12400000 ZZH R&B MH

## 2020-01-01 RX ADMIN — BREXPIPRAZOLE 3 MG: 2 TABLET ORAL at 10:47

## 2020-01-01 RX ADMIN — VORTIOXETINE 20 MG: 20 TABLET, FILM COATED ORAL at 08:59

## 2020-01-01 RX ADMIN — LAMOTRIGINE 200 MG: 100 TABLET ORAL at 08:59

## 2020-01-01 RX ADMIN — MIRTAZAPINE 45 MG: 45 TABLET, FILM COATED ORAL at 20:03

## 2020-01-01 ASSESSMENT — ACTIVITIES OF DAILY LIVING (ADL)
ORAL_HYGIENE: INDEPENDENT
HYGIENE/GROOMING: INDEPENDENT
DRESS: STREET CLOTHES

## 2020-01-01 NOTE — PLAN OF CARE
Patient isolating to room and reading. Not social with peers and speaks minimally to staff. She states that she is angry that she needs to go through ECT. She does not have insight into why. She states that it is because she is depressed but did not say anything about being suicidal. She does have chronic suicidal thoughts to go home and overdose. Affect is flat and mood is depressed.

## 2020-01-01 NOTE — PLAN OF CARE
"Pt spoke to her son on the phone this evening; stated \"He gives me good advice and says to just go ahead with the treatment so you can come home\". Pt assisted staff with preparing bedtime snack for peers in the kitchen; also did a load of her laundry. Pt stated has seen a lot of people come and go; her roommate discharged today. Flat affect. Pt did not attend groups. Prompted to drink fluids.  "

## 2020-01-02 LAB — INTERPRETATION ECG - MUSE: NORMAL

## 2020-01-02 PROCEDURE — 25000132 ZZH RX MED GY IP 250 OP 250 PS 637: Performed by: PSYCHIATRY & NEUROLOGY

## 2020-01-02 PROCEDURE — 12400000 ZZH R&B MH

## 2020-01-02 RX ADMIN — LAMOTRIGINE 200 MG: 100 TABLET ORAL at 09:33

## 2020-01-02 RX ADMIN — MIRTAZAPINE 45 MG: 45 TABLET, FILM COATED ORAL at 20:41

## 2020-01-02 RX ADMIN — VORTIOXETINE 20 MG: 20 TABLET, FILM COATED ORAL at 09:33

## 2020-01-02 RX ADMIN — BREXPIPRAZOLE 3 MG: 2 TABLET ORAL at 09:33

## 2020-01-02 ASSESSMENT — ACTIVITIES OF DAILY LIVING (ADL)
LAUNDRY: WITH SUPERVISION
LAUNDRY: WITH SUPERVISION
ORAL_HYGIENE: INDEPENDENT
DRESS: INDEPENDENT
HYGIENE/GROOMING: INDEPENDENT
DRESS: INDEPENDENT
ORAL_HYGIENE: INDEPENDENT
HYGIENE/GROOMING: INDEPENDENT

## 2020-01-02 NOTE — PLAN OF CARE
Problem: Adult Inpatient Plan of Care  Goal: Patient-Specific Goal (Individualization)  Description  1. Absence of suicidal ideation with safety plan in place.  2. Effective medication regime with patient compliance.  3. Stabilization of mood and thought processes.  4. Improved insight into mental health issues.  5. Able to identify and utilize positive coping skills.  6. Plan in place for ongoing treatment and support.     Outcome: No Change  Note:   Pt has been spending time in her room and has been withdrawn and isolative. Pt frustrated and angry about having to go through with ECT. Pt encouraged to think more positively about this treatment and view it as a chance to have a better outlook. Pt is not happy that she has to go through with this but is willing to cooperate. Pt feels hopeless dn depressed. Pt has chronic suicidal ideation with no plan. Pt to have ECT #1 in the am. Pt declines watching the ECT video.

## 2020-01-02 NOTE — PLAN OF CARE
Pt continues to appear depressed and isolate in her room. Pt did eat supper in lounge. Pt states the closer it gets to having ECT, the more angry she feels. Pt states she talked to her 2 sons on the phone today, but did not have visitors. Med compliant. declined evening group activities.

## 2020-01-02 NOTE — PROGRESS NOTES
[facility] Psychiatric Progress Note      Interval History:   Pt seen, team meeting held with , nursing staff, OT, and PA's to review diagnosis and treatment plan.  I met with Misty in her room.  She remains a bit dismissive, scheduled to have ECT per Travis Andersen order.  I entered orders for her treatment in [facility] today.  She continues to express feeling depressed, angry about having ECT but is not resisting this recommendation overtly.  I explained the side effects, risks, benefits.  She expresses some passive suicidal thinking, was somewhat evasive with me.     Review of systems:   10 point Review of Systems conducted by Dr Devi is negative, other than noted in the HPI     Medications:       brexpiprazole  3 mg Oral Daily     lamoTRIgine  200 mg Oral Daily     mirtazapine  45 mg Oral At Bedtime     vortioxetine  20 mg Oral Daily     hydrOXYzine    Mental Status Examination:     Appearance:  awake, alert, adequately groomed, dressed in hospital scrubs and uncooperative  Eye Contact:  poor   Speech:  clear, coherent and decreased prosody  Language:Normal  Psychomotor Behavior:  no evidence of tardive dyskinesia, dystonia, or tics  Mood:  angry and depressed  Affect:  intensity is flat  Thought Process:  logical, linear and goal oriented no loose associations  Thought Content:  passive suicidal ideation present  Oriented to:  time, person, and place  Attention Span and Concentration:  intact  Recent and Remote Memory:  intact  Fund of Knowledge: appropriate  Muscle Strength and Tone: normal  Gait and Station: Normal  Insight:  limited  Judgment:  poor        Labs/Vitals:   No results found for this or any previous visit (from the past 24 hour(s)).  B/P: 107/75, T: 98.1, P: 71, R: 16    Impression:   Misty is much the same, remains depressed, resistant to treatment recommendations. ECT per price-truong order planned for friday      DIagnoses:   1. Major Depressive disorder,  recurrent, severe   2.IVY  3.Personality disorder NOS           Plan:   1. Written information given on medications. Side effects, risks, benefits reviewed.  2. Continue current meds  3. BL ECT per price truong order to start Friday 1/3/20 to allow up to 6 BL tx MWF over 2 weeks, then up to 2 tx per week for the duration of the committment        Attestation:  Patient has been seen and evaluated by me,  Doug Devi MD

## 2020-01-03 ENCOUNTER — ANESTHESIA EVENT (OUTPATIENT)
Dept: SURGERY | Facility: CLINIC | Age: 35
End: 2020-01-03

## 2020-01-03 ENCOUNTER — ANESTHESIA (OUTPATIENT)
Dept: SURGERY | Facility: CLINIC | Age: 35
End: 2020-01-03

## 2020-01-03 PROCEDURE — 25800030 ZZH RX IP 258 OP 636: Performed by: NURSE ANESTHETIST, CERTIFIED REGISTERED

## 2020-01-03 PROCEDURE — 12400000 ZZH R&B MH

## 2020-01-03 PROCEDURE — 25000128 H RX IP 250 OP 636: Performed by: NURSE ANESTHETIST, CERTIFIED REGISTERED

## 2020-01-03 PROCEDURE — 90870 ELECTROCONVULSIVE THERAPY: CPT

## 2020-01-03 PROCEDURE — 25000132 ZZH RX MED GY IP 250 OP 250 PS 637: Performed by: PSYCHIATRY & NEUROLOGY

## 2020-01-03 PROCEDURE — 25000125 ZZHC RX 250: Performed by: NURSE ANESTHETIST, CERTIFIED REGISTERED

## 2020-01-03 PROCEDURE — 40000010 ZZH STATISTIC ANES STAT CODE-CRNA PER MINUTE

## 2020-01-03 PROCEDURE — 37000008 ZZH ANESTHESIA TECHNICAL FEE, 1ST 30 MIN

## 2020-01-03 PROCEDURE — GZB4ZZZ OTHER ELECTROCONVULSIVE THERAPY: ICD-10-PCS | Performed by: PSYCHIATRY & NEUROLOGY

## 2020-01-03 RX ORDER — ONDANSETRON 2 MG/ML
4 INJECTION INTRAMUSCULAR; INTRAVENOUS EVERY 30 MIN PRN
Status: DISCONTINUED | OUTPATIENT
Start: 2020-01-03 | End: 2020-01-03 | Stop reason: HOSPADM

## 2020-01-03 RX ORDER — ONDANSETRON 4 MG/1
4 TABLET, ORALLY DISINTEGRATING ORAL EVERY 30 MIN PRN
Status: DISCONTINUED | OUTPATIENT
Start: 2020-01-03 | End: 2020-01-03 | Stop reason: HOSPADM

## 2020-01-03 RX ORDER — HYDROMORPHONE HYDROCHLORIDE 1 MG/ML
.3-.5 INJECTION, SOLUTION INTRAMUSCULAR; INTRAVENOUS; SUBCUTANEOUS EVERY 5 MIN PRN
Status: DISCONTINUED | OUTPATIENT
Start: 2020-01-03 | End: 2020-01-03 | Stop reason: HOSPADM

## 2020-01-03 RX ORDER — MEPERIDINE HYDROCHLORIDE 25 MG/ML
12.5 INJECTION INTRAMUSCULAR; INTRAVENOUS; SUBCUTANEOUS EVERY 5 MIN PRN
Status: DISCONTINUED | OUTPATIENT
Start: 2020-01-03 | End: 2020-01-03 | Stop reason: HOSPADM

## 2020-01-03 RX ORDER — ALBUTEROL SULFATE 0.83 MG/ML
2.5 SOLUTION RESPIRATORY (INHALATION) EVERY 4 HOURS PRN
Status: DISCONTINUED | OUTPATIENT
Start: 2020-01-03 | End: 2020-01-03 | Stop reason: HOSPADM

## 2020-01-03 RX ORDER — HYDRALAZINE HYDROCHLORIDE 20 MG/ML
2.5-5 INJECTION INTRAMUSCULAR; INTRAVENOUS EVERY 10 MIN PRN
Status: DISCONTINUED | OUTPATIENT
Start: 2020-01-03 | End: 2020-01-03 | Stop reason: HOSPADM

## 2020-01-03 RX ORDER — FENTANYL CITRATE 50 UG/ML
25-50 INJECTION, SOLUTION INTRAMUSCULAR; INTRAVENOUS
Status: DISCONTINUED | OUTPATIENT
Start: 2020-01-03 | End: 2020-01-03 | Stop reason: HOSPADM

## 2020-01-03 RX ORDER — SODIUM CHLORIDE, SODIUM LACTATE, POTASSIUM CHLORIDE, CALCIUM CHLORIDE 600; 310; 30; 20 MG/100ML; MG/100ML; MG/100ML; MG/100ML
INJECTION, SOLUTION INTRAVENOUS CONTINUOUS PRN
Status: DISCONTINUED | OUTPATIENT
Start: 2020-01-03 | End: 2020-01-03

## 2020-01-03 RX ORDER — SODIUM CHLORIDE, SODIUM LACTATE, POTASSIUM CHLORIDE, CALCIUM CHLORIDE 600; 310; 30; 20 MG/100ML; MG/100ML; MG/100ML; MG/100ML
INJECTION, SOLUTION INTRAVENOUS CONTINUOUS
Status: DISCONTINUED | OUTPATIENT
Start: 2020-01-03 | End: 2020-01-03 | Stop reason: HOSPADM

## 2020-01-03 RX ORDER — LABETALOL HYDROCHLORIDE 5 MG/ML
10 INJECTION, SOLUTION INTRAVENOUS
Status: DISCONTINUED | OUTPATIENT
Start: 2020-01-03 | End: 2020-01-03 | Stop reason: HOSPADM

## 2020-01-03 RX ADMIN — VORTIOXETINE 20 MG: 20 TABLET, FILM COATED ORAL at 09:21

## 2020-01-03 RX ADMIN — SODIUM CHLORIDE, POTASSIUM CHLORIDE, SODIUM LACTATE AND CALCIUM CHLORIDE: 600; 310; 30; 20 INJECTION, SOLUTION INTRAVENOUS at 07:01

## 2020-01-03 RX ADMIN — MIRTAZAPINE 45 MG: 45 TABLET, FILM COATED ORAL at 20:28

## 2020-01-03 RX ADMIN — LAMOTRIGINE 200 MG: 100 TABLET ORAL at 09:22

## 2020-01-03 RX ADMIN — SUCCINYLCHOLINE CHLORIDE 100 MG: 20 INJECTION, SOLUTION INTRAMUSCULAR; INTRAVENOUS; PARENTERAL at 07:34

## 2020-01-03 RX ADMIN — METHOHEXITAL SODIUM 100 MG: 500 INJECTION, POWDER, LYOPHILIZED, FOR SOLUTION INTRAMUSCULAR; INTRAVENOUS; RECTAL at 07:34

## 2020-01-03 RX ADMIN — BREXPIPRAZOLE 3 MG: 2 TABLET ORAL at 09:21

## 2020-01-03 ASSESSMENT — LIFESTYLE VARIABLES: TOBACCO_USE: 0

## 2020-01-03 NOTE — PLAN OF CARE
"Pleasant.  Isolative and withdrawn.  Spent almost the entire shift bed resting and reading.  Declined groups.  States she is, \"Hanging in there.\"  Reports her mood is, \"Down.\"   Also states that she is \"angry\" and attributes this to having to undergo ECT in the morning.  Continues to state that she has already completed the procedure and does not want to do it again.  She is scheduled to have ECT #1 tomorrow morning per Robles Andersen order. Endorses SI with the same plan to overdose.  Verbally contracts for safety.  Denied any pain or physical complaint.  Flat, depressed, angry and anxious.  Continue treatment plan.  "

## 2020-01-03 NOTE — ANESTHESIA PREPROCEDURE EVALUATION
Anesthesia Pre-Procedure Evaluation    Patient: Misty Parham   MRN: [id number] : 1985          Preoperative Diagnosis: * No pre-op diagnosis entered *    * No procedures listed *    Past Medical History:   Diagnosis Date     Depressive disorder      Past Surgical History:   Procedure Laterality Date     CHOLECYSTECTOMY         Anesthesia Evaluation     . Pt has had prior anesthetic. Type: General    No history of anesthetic complications          ROS/MED HX    ENT/Pulmonary:      (-) tobacco use and sleep apnea   Neurologic:      (-) Delerium   Cardiovascular:     (+) ----. : . . . :. . Previous cardiac testing date:results:date: results:ECG reviewed date:19 results:NSR date: results:          METS/Exercise Tolerance:  >4 METS   Hematologic:         Musculoskeletal:         GI/Hepatic:        (-) GERD   Renal/Genitourinary:         Endo:     (+) Obesity (BMI 37), .      Psychiatric:     (+) psychiatric history (severe, requring ECT) depression      Infectious Disease:         Malignancy:         Other:    (+) No chance of pregnancy                           Physical Exam  Normal systems: cardiovascular, pulmonary and dental    Airway   Mallampati: III  TM distance: >3 FB  Neck ROM: full    Dental     Cardiovascular       Pulmonary             Lab Results   Component Value Date    WBC 6.6 2019    HGB 13.0 2019    HCT 39.9 2019     2019     2019    POTASSIUM 3.8 2019    CHLORIDE 111 (H) 2019    CO2 28 2019    BUN 13 2019    CR 0.84 2019    GLC 90 2019    CRISTINE 8.8 2019    ALBUMIN 3.7 2019    PROTTOTAL 7.8 2019    ALT 21 2019    AST 20 2019    ALKPHOS 76 2019    BILITOTAL 0.4 2019    TSH 1.61 2019    HCG Negative 2019       Preop Vitals  BP Readings from Last 3 Encounters:   20 109/75   10/23/19 121/84   19 107/68    Pulse Readings from Last 3 Encounters:  "  01/03/20 66   10/23/19 85   09/20/19 80      Resp Readings from Last 3 Encounters:   01/03/20 14   10/23/19 16   09/20/19 16    SpO2 Readings from Last 3 Encounters:   01/03/20 98%   10/23/19 98%   09/20/19 96%      Temp Readings from Last 1 Encounters:   01/03/20 36.6  C (97.8  F) (Temporal)    Ht Readings from Last 1 Encounters:   10/07/19 1.651 m (5' 5\")      Wt Readings from Last 1 Encounters:   12/31/19 100.6 kg (221 lb 12.8 oz)    Estimated body mass index is 36.91 kg/m  as calculated from the following:    Height as of 10/7/19: 1.651 m (5' 5\").    Weight as of 12/31/19: 100.6 kg (221 lb 12.8 oz).       Anesthesia Plan      History & Physical Review  History and physical reviewed and following examination; no interval change.    ASA Status:  3 .    NPO Status:  > 8 hours    Plan for General with Intravenous induction.          Postoperative Care      Consents  Anesthetic plan, risks, benefits and alternatives discussed with:  Patient.  Use of blood products discussed: No .   .                   Tamara Dumont MD, MD  "

## 2020-01-03 NOTE — PROCEDURES
[facility] ECT Procedure Note    I have reviewed the Findings of the Fact, Conclusion of Law and Order Authorizing Electroconvulsive Therapy signed by the court on December 27, 2019.  Under this order the head of the hospital is authorized to administer up to three (3) treatments per week for a period of two (2) weeks followed by up to two (2) treatments per week for the duration of the commitment expiring on June 18, 2020 unless there is subsequent order by the court pertaining to this matter      Misty Mckeon Davidne [id number]   34 year old 1985     Patient Status: Inpatient    No Known Allergies    Weight:  221 lbs 12.8 oz              Diagnosis:   Major depression       Indications for ECT:   History of good ECT response in one or more previous episodes of illness       Pause for the Cause:     Right patient Yes   Right procedure/laterality settings: Yes   Right diagnosis Yes          Intra-Procedure Documentation:     Date:  1/3/2020  Time:  6:44 AM    ECT #    Treatment number this series: 1   Total treatment number: 1   Type of ECT:  Bilateral, standard    ECT Medications administered: Brevital: 100mg  Succinyl Choline: 100mg         Clinical Narrative:     ECT was administered by Thymatron machine.  Pt has been medically cleared for procedure, consent signed. Side effects, Risks and benefits reviewed.    ECT Strip Summary:   Energy Level: 50 percent  Motor Seizure Duration: 31 seconds  EEG Seizure Duration: 31 seconds    Complications: Yes Travis Sr and Lexie    Plan: 2nd ECT 1/6/20  Outpatient Psychiatrist:Drew Juarez NP

## 2020-01-03 NOTE — ANESTHESIA POSTPROCEDURE EVALUATION
Patient: Misty Parham    * No procedures listed *    Diagnosis:* No pre-op diagnosis entered *  Diagnosis Additional Information: No value filed.    Anesthesia Type:  General    Note:  Anesthesia Post Evaluation    Patient location during evaluation: PACU  Patient participation: Able to fully participate in evaluation  Level of consciousness: awake  Pain management: adequate  Airway patency: patent  Cardiovascular status: acceptable  Respiratory status: acceptable  Hydration status: acceptable  PONV: none     Anesthetic complications: None          Last vitals:  Vitals:    01/03/20 0740 01/03/20 0745 01/03/20 0750   BP: 123/83 121/82 115/87   Pulse: 82 88 83   Resp: 15 17 17   Temp:      SpO2: 98% 98% 98%         Electronically Signed By: Tamara Dumont MD, MD  January 3, 2020  7:53 AM

## 2020-01-03 NOTE — PLAN OF CARE
Problem: Depressive Symptoms  Goal: Depressive Symptoms  Description  Signs and symptoms of listed problems will be absent or manageable.  Flowsheets (Taken 1/3/2020 2445)  Depressive Symptoms Assessed: all  Depressive Symptoms Present: affect; mood  Note:   Patient's vitals were WNL. Patient had no medication changes. Patient took no PRNs. Patient was isolative and withdrawn. Patient complained of jaw pain. Patient denies any suicidal ideation. Patient was prompted to go to group and Patient stated that she thought about it. Patient still appears depressed but appears to have slightly brighten in her affect upon approach.

## 2020-01-03 NOTE — ANESTHESIA CARE TRANSFER NOTE
Patient: Misty Parham    * No procedures listed *    Diagnosis: * No pre-op diagnosis entered *  Diagnosis Additional Information: No value filed.    Anesthesia Type:   General     Note:  Airway :Nasal Cannula  Patient transferred to:PACU  Comments: Native airway general anesthetic.  Patient hyperventilated with 100% oxygen via mask prior to treatment.   Anesthesia induced using patent peripheral IV.    Bite block placed between molars to protect teeth and tongue.     After induction of seizure patient mask ventilated with 100% oxygen until spontaneous respirations returned.     At time of handoff to PACU, patient exhibited spontaneous respirations, adequate tidal volumes, airway patent. Oxygen via nasal cannula at 3 liters per minute to PACU in cart with siderails up, connected to wall O2 in PACU. All monitors and alarms on and functioning. Report given to PACU RN and questions answered. Handoff Report: Identifed the Patient, Identified the Reponsible Provider, Reviewed the pertinent medical history, Discussed the surgical course, Reviewed Intra-OP anesthesia mangement and issues during anesthesia, Set expectations for post-procedure period and Allowed opportunity for questions and acknowledgement of understanding      Vitals: (Last set prior to Anesthesia Care Transfer)    CRNA VITALS  1/3/2020 0710 - 1/3/2020 0741      1/3/2020             Pulse:  80    SpO2:  100 %    Resp Rate (observed):  17    Resp Rate (set):  10                Electronically Signed By: WILLIAN Valdovinos CRNA  January 3, 2020  7:41 AM

## 2020-01-03 NOTE — PROGRESS NOTES
[facility] Psychiatric Progress Note      Interval History:   Pt seen, team meeting held with , nursing staff, OT, and PA's to review diagnosis and treatment plan.  I met with Misty in PACU before her ECT treatment.  She remains passive, dysthymic, passively suicidal.  She makes poor eye contact.  We briefly talked about ECT, side effects risks and benefits.  As previously discussed, there is a price [facility]en order in place, with parameters to have up to 6 treatments Monday Wednesday and Friday over 2 weeks followed by twice weekly maintenance for the duration of her commitment if deemed necessary.  I reviewed this with Dr. Kwon, these parameters were added to the consent form which he signed.  The patient did not sign consent due to the court order for obvious reasons.    10 point Review of Systems conducted by Dr Devi is negative, other than noted in the HPI     Medications:       brexpiprazole  3 mg Oral Daily     lamoTRIgine  200 mg Oral Daily     mirtazapine  45 mg Oral At Bedtime     vortioxetine  20 mg Oral Daily     hydrOXYzine    Mental Status Examination:     Appearance:  awake, alert, adequately groomed, dressed in hospital scrubs and uncooperative  Eye Contact:  poor   Speech:  clear, coherent and decreased prosody  Language:Normal  Psychomotor Behavior:  no evidence of tardive dyskinesia, dystonia, or tics  Mood:  angry and depressed  Affect:  intensity is flat  Thought Process:  logical, linear and goal oriented no loose associations  Thought Content:  passive suicidal ideation present  Oriented to:  time, person, and place  Attention Span and Concentration:  intact  Recent and Remote Memory:  intact  Fund of Knowledge: appropriate  Muscle Strength and Tone: normal  Gait and Station: Normal  Insight:  limited  Judgment:  poor        Labs/Vitals:   No results found for this or any previous visit (from the past 24 hour(s)).  B/P: 107/75, T: 98.1, P: 71, R: 16     Impression:   Misty is unchanged, initiating ECT per court order today      DIagnoses:   1. Major Depressive disorder, recurrent, severe   2.IVY  3.Personality disorder NOS           Plan:   1. Written information given on medications. Side effects, risks, benefits reviewed.  2. Continue current meds, no changes  3. BL ECT per price truong order  started today 1/3/20 to allow up to 6 BL tx MWF over 2 weeks, then up to 2 tx per week for the duration of the committment        Attestation:  Patient has been seen and evaluated by me,  Doug Devi MD

## 2020-01-04 PROCEDURE — 12400000 ZZH R&B MH

## 2020-01-04 PROCEDURE — 25000132 ZZH RX MED GY IP 250 OP 250 PS 637: Performed by: PSYCHIATRY & NEUROLOGY

## 2020-01-04 RX ADMIN — VORTIOXETINE 20 MG: 20 TABLET, FILM COATED ORAL at 09:11

## 2020-01-04 RX ADMIN — MIRTAZAPINE 45 MG: 45 TABLET, FILM COATED ORAL at 20:33

## 2020-01-04 RX ADMIN — LAMOTRIGINE 200 MG: 100 TABLET ORAL at 09:11

## 2020-01-04 RX ADMIN — BREXPIPRAZOLE 3 MG: 2 TABLET ORAL at 09:11

## 2020-01-04 ASSESSMENT — ACTIVITIES OF DAILY LIVING (ADL)
ORAL_HYGIENE: INDEPENDENT
DRESS: INDEPENDENT
HYGIENE/GROOMING: INDEPENDENT

## 2020-01-05 PROCEDURE — 25000132 ZZH RX MED GY IP 250 OP 250 PS 637: Performed by: PSYCHIATRY & NEUROLOGY

## 2020-01-05 PROCEDURE — 12400000 ZZH R&B MH

## 2020-01-05 RX ORDER — ACETAMINOPHEN 325 MG/1
650 TABLET ORAL EVERY 4 HOURS PRN
Status: DISCONTINUED | OUTPATIENT
Start: 2020-01-05 | End: 2020-01-16 | Stop reason: HOSPADM

## 2020-01-05 RX ADMIN — ACETAMINOPHEN 650 MG: 325 TABLET, FILM COATED ORAL at 13:53

## 2020-01-05 RX ADMIN — LAMOTRIGINE 200 MG: 100 TABLET ORAL at 09:28

## 2020-01-05 RX ADMIN — MIRTAZAPINE 45 MG: 45 TABLET, FILM COATED ORAL at 21:23

## 2020-01-05 RX ADMIN — VORTIOXETINE 20 MG: 20 TABLET, FILM COATED ORAL at 09:28

## 2020-01-05 RX ADMIN — BREXPIPRAZOLE 3 MG: 2 TABLET ORAL at 09:27

## 2020-01-05 ASSESSMENT — ACTIVITIES OF DAILY LIVING (ADL)
DRESS: INDEPENDENT
HYGIENE/GROOMING: INDEPENDENT
ORAL_HYGIENE: INDEPENDENT

## 2020-01-05 NOTE — PLAN OF CARE
"Pt and writer created a \"think positive\" notebook.  Pt wrote down three positive thoughts and shared them with writer. Pt napped and read in her room most of the shift.  Pt mood was calm. Pt reports ECT is helping and is feeling more positive about the future.    "

## 2020-01-05 NOTE — PLAN OF CARE
"Brightens with conversation, asked if she had made any progress on her \"think positive\" notebook. She said that she has not worked on it today because she has not felt very positive today. Says that she does not really want to do ECT tomorrow, because she is \"sore all over.\" She spent most of the morning in her room but was later to eat lunch and watch football game. Med compliant. ECT # 2 scheduled for tomorrow  "

## 2020-01-06 ENCOUNTER — ANESTHESIA EVENT (OUTPATIENT)
Dept: SURGERY | Facility: CLINIC | Age: 35
End: 2020-01-06

## 2020-01-06 ENCOUNTER — ANESTHESIA (OUTPATIENT)
Dept: SURGERY | Facility: CLINIC | Age: 35
End: 2020-01-06

## 2020-01-06 PROCEDURE — 25000128 H RX IP 250 OP 636: Performed by: NURSE ANESTHETIST, CERTIFIED REGISTERED

## 2020-01-06 PROCEDURE — 90870 ELECTROCONVULSIVE THERAPY: CPT

## 2020-01-06 PROCEDURE — 25000132 ZZH RX MED GY IP 250 OP 250 PS 637: Performed by: PSYCHIATRY & NEUROLOGY

## 2020-01-06 PROCEDURE — 25800030 ZZH RX IP 258 OP 636: Performed by: ANESTHESIOLOGY

## 2020-01-06 PROCEDURE — 12400000 ZZH R&B MH

## 2020-01-06 PROCEDURE — 25800030 ZZH RX IP 258 OP 636: Performed by: NURSE ANESTHETIST, CERTIFIED REGISTERED

## 2020-01-06 PROCEDURE — 25000125 ZZHC RX 250: Performed by: NURSE ANESTHETIST, CERTIFIED REGISTERED

## 2020-01-06 PROCEDURE — 25000128 H RX IP 250 OP 636: Performed by: PSYCHIATRY & NEUROLOGY

## 2020-01-06 PROCEDURE — 99232 SBSQ HOSP IP/OBS MODERATE 35: CPT | Mod: 25 | Performed by: PSYCHIATRY & NEUROLOGY

## 2020-01-06 RX ORDER — SODIUM CHLORIDE, SODIUM LACTATE, POTASSIUM CHLORIDE, CALCIUM CHLORIDE 600; 310; 30; 20 MG/100ML; MG/100ML; MG/100ML; MG/100ML
INJECTION, SOLUTION INTRAVENOUS CONTINUOUS PRN
Status: DISCONTINUED | OUTPATIENT
Start: 2020-01-06 | End: 2020-01-06

## 2020-01-06 RX ORDER — KETOROLAC TROMETHAMINE 30 MG/ML
30 INJECTION, SOLUTION INTRAMUSCULAR; INTRAVENOUS
Status: DISCONTINUED | OUTPATIENT
Start: 2020-01-06 | End: 2020-01-11

## 2020-01-06 RX ORDER — SODIUM CHLORIDE, SODIUM LACTATE, POTASSIUM CHLORIDE, CALCIUM CHLORIDE 600; 310; 30; 20 MG/100ML; MG/100ML; MG/100ML; MG/100ML
500 INJECTION, SOLUTION INTRAVENOUS CONTINUOUS
Status: DISCONTINUED | OUTPATIENT
Start: 2020-01-06 | End: 2020-01-08

## 2020-01-06 RX ADMIN — SODIUM CHLORIDE, POTASSIUM CHLORIDE, SODIUM LACTATE AND CALCIUM CHLORIDE: 600; 310; 30; 20 INJECTION, SOLUTION INTRAVENOUS at 07:04

## 2020-01-06 RX ADMIN — METHOHEXITAL SODIUM 100 MG: 500 INJECTION, POWDER, LYOPHILIZED, FOR SOLUTION INTRAMUSCULAR; INTRAVENOUS; RECTAL at 07:07

## 2020-01-06 RX ADMIN — LAMOTRIGINE 200 MG: 100 TABLET ORAL at 08:00

## 2020-01-06 RX ADMIN — SUCCINYLCHOLINE CHLORIDE 100 MG: 20 INJECTION, SOLUTION INTRAMUSCULAR; INTRAVENOUS; PARENTERAL at 07:07

## 2020-01-06 RX ADMIN — MIRTAZAPINE 45 MG: 45 TABLET, FILM COATED ORAL at 22:14

## 2020-01-06 RX ADMIN — SODIUM CHLORIDE, POTASSIUM CHLORIDE, SODIUM LACTATE AND CALCIUM CHLORIDE 500 ML: 600; 310; 30; 20 INJECTION, SOLUTION INTRAVENOUS at 06:35

## 2020-01-06 RX ADMIN — BREXPIPRAZOLE 3 MG: 2 TABLET ORAL at 07:59

## 2020-01-06 RX ADMIN — VORTIOXETINE 20 MG: 20 TABLET, FILM COATED ORAL at 07:59

## 2020-01-06 RX ADMIN — KETOROLAC TROMETHAMINE 30 MG: 30 INJECTION, SOLUTION INTRAMUSCULAR at 06:35

## 2020-01-06 ASSESSMENT — LIFESTYLE VARIABLES: TOBACCO_USE: 0

## 2020-01-06 NOTE — PLAN OF CARE
Problem: Depressive Symptoms  Goal: Depressive Symptoms  Description  Signs and symptoms of listed problems will be absent or manageable.  Flowsheets (Taken 1/6/2020 1450)  Depressive Symptoms Assessed: all  Depressive Symptoms Present: affect; mood  Note:   Patient's vitals were WNL. Patient took no PRNs. Patient still is depressed and anxious however Patient denies Suicidal ideation. Patient reports poor concentration even though staff observed her reading. Patient declined groups even when Patient was prompted by staff. Patient appears very anxious and sad but slightly brightens upon approach.

## 2020-01-06 NOTE — PLAN OF CARE
BEHAVIORAL TEAM DISCUSSION    Participants: MD, CM, OT, RN  Progress: Initially refused ECT but went, brighter as day went on, trouble concentrating, ruminative, very negative over weekend about treatment  Anticipated length of stay: 2 weeks  Continued Stay Criteria/Rationale: Continue ECT, medication management  Medical/Physical: NA  Precautions:   Behavioral Orders   Procedures     Code 1 - Restrict to Unit     Code 2 - 1:1 Staff Supervision     For ECT only     Discontinue 1:1 attendant for suicide risk     Order Specific Question:   I have performed an in person assessment of the patient     Answer:   Based on this assessment the patient no longer requires a one on one attendant at this point in time.     Electroconvulsive therapy     Series of up to 12 treatments. Begin Date: 1/3/20     Treating Psychiatrist providing ECT:  Dr Kwon     Notified on:  Today  Patient under price truong, paperwork on chart     Electroconvulsive therapy     Series of up to 12 treatments. Begin Date: 1/3/20     Treating Psychiatrist providing ECT:  Dr Reynaldo Kwon     Notified on:  today     Fall precautions     Routine Programming     As clinically indicated     Status 15     Every 15 minutes.     Plan: medication management, continue ECT, encourage group attendance  Rationale for change in precautions or plan: NA

## 2020-01-06 NOTE — PROCEDURES
[facility] ECT Procedure Note    I have reviewed the Findings of the Fact, Conclusion of Law and Order Authorizing Electroconvulsive Therapy signed by the court on December 27, 2019.  Under this order the head of the hospital is authorized to administer up to three (3) treatments per week for a period of two (2) weeks followed by up to two (2) treatments per week for the duration of the commitment expiring on June 18, 2020 unless there is subsequent order by the court pertaining to this matter      Misty Mckeon Davidne [id number]   34 year old 1985     Patient Status: Inpatient    No Known Allergies    Weight:  221 lbs 12.8 oz              Diagnosis:   Major depression       Indications for ECT:   History of good ECT response in one or more previous episodes of illness       Pause for the Cause:     Right patient Yes   Right procedure/laterality settings: Yes   Right diagnosis Yes          Intra-Procedure Documentation:     Date:  1/6/2020  Time:  6:44 AM    ECT #    Treatment number this series: 2   Total treatment number: 2   Type of ECT:  Bilateral, standard    ECT Medications administered: Brevital: 100mg  Succinyl Choline: 100mg         Clinical Narrative:     ECT was administered by Thymatron machine.  Pt has been medically cleared for procedure, consent signed. Side effects, Risks and benefits reviewed.    ECT Strip Summary:   Energy Level: 55 percent  Motor Seizure Duration: 31 seconds  EEG Seizure Duration: 31 seconds    Complications: Yes Travis Sr and Lexie    Plan: 3rd ECT 1/8/20  Outpatient Psychiatrist:Drew Juarez NP

## 2020-01-06 NOTE — ANESTHESIA CARE TRANSFER NOTE
Patient: Misty Parham    * No procedures listed *    Diagnosis: * No pre-op diagnosis entered *  Diagnosis Additional Information: No value filed.    Anesthesia Type:   General     Note:  Airway :Nasal Cannula  Patient transferred to:PACU  Comments: Native airway general anesthetic.  Patient hyperventilated with 100% oxygen via mask prior to treatment.   Anesthesia induced using patent peripheral IV.    Bite block placed between molars to protect teeth and tongue.     After induction of seizure patient mask ventilated with 100% oxygen until spontaneous respirations returned.     At time of handoff to PACU, patient exhibited spontaneous respirations, adequate tidal volumes, airway patent. Oxygen via nasal cannula at 4 liters per minute to PACU in cart with siderails up, connected to wall O2 in PACU. All monitors and alarms on and functioning. Report given to PACU RN and questions answered. Handoff Report: Identifed the Patient, Identified the Reponsible Provider, Reviewed the pertinent medical history, Discussed the surgical course, Reviewed Intra-OP anesthesia mangement and issues during anesthesia, Set expectations for post-procedure period and Allowed opportunity for questions and acknowledgement of understanding      Vitals: (Last set prior to Anesthesia Care Transfer)    CRNA VITALS  1/6/2020 0644 - 1/6/2020 0714      1/6/2020             Pulse:  89    SpO2:  100 %    Resp Rate (observed):  16    Resp Rate (set):  10                Electronically Signed By: WILLIAN Longoria CRNA  January 6, 2020  7:14 AM

## 2020-01-06 NOTE — ANESTHESIA POSTPROCEDURE EVALUATION
Patient: Misty Parham    * No procedures listed *    Diagnosis:* No pre-op diagnosis entered *  Diagnosis Additional Information: No value filed.    Anesthesia Type:  General    Note:  Anesthesia Post Evaluation    Patient location during evaluation: PACU  Patient participation: Able to fully participate in evaluation  Level of consciousness: awake and alert  Pain management: adequate  Airway patency: patent  Cardiovascular status: acceptable  Respiratory status: acceptable  Hydration status: acceptable  PONV: none and controlled     Anesthetic complications: None          Last vitals:  Vitals:    01/06/20 0740 01/06/20 0745 01/06/20 0803   BP: 116/82 118/81 (!) 126/90   Pulse: 91 77    Resp: 16 15 16   Temp:   36.7  C (98  F)   SpO2: 97% 97% 97%         Electronically Signed By: Bryce Argueta MD  January 6, 2020  8:19 AM

## 2020-01-06 NOTE — PLAN OF CARE
"Pt spent most of shift in bed reading or sleeping. Pt spoke to her children on the phone. Youngest son has the flu. Pt said, \"I need to get home and take care of him.\" Pt reading self help book, [employer], and reporting back to staff what she is learning. Today we talked about the \"chatter in our heads\" and how deep breathing can help calm us. Pt declined activities and had not written in her positive thoughts book today.    "

## 2020-01-07 ENCOUNTER — ANESTHESIA EVENT (OUTPATIENT)
Dept: SURGERY | Facility: CLINIC | Age: 35
End: 2020-01-07

## 2020-01-07 PROCEDURE — 99232 SBSQ HOSP IP/OBS MODERATE 35: CPT | Performed by: PSYCHIATRY & NEUROLOGY

## 2020-01-07 PROCEDURE — 25000132 ZZH RX MED GY IP 250 OP 250 PS 637: Performed by: PSYCHIATRY & NEUROLOGY

## 2020-01-07 PROCEDURE — 12400000 ZZH R&B MH

## 2020-01-07 RX ADMIN — VORTIOXETINE 20 MG: 20 TABLET, FILM COATED ORAL at 08:53

## 2020-01-07 RX ADMIN — BREXPIPRAZOLE 3 MG: 2 TABLET ORAL at 08:53

## 2020-01-07 RX ADMIN — MIRTAZAPINE 45 MG: 45 TABLET, FILM COATED ORAL at 20:39

## 2020-01-07 RX ADMIN — LAMOTRIGINE 200 MG: 100 TABLET ORAL at 08:53

## 2020-01-07 ASSESSMENT — ACTIVITIES OF DAILY LIVING (ADL)
ORAL_HYGIENE: INDEPENDENT
HYGIENE/GROOMING: INDEPENDENT
LAUNDRY: WITH SUPERVISION
DRESS: INDEPENDENT

## 2020-01-07 NOTE — PLAN OF CARE
Pt expressed frustration over being forced to do ECT but acknowledged that it does help her.  Pt called her two sons today. Pt  called and said he would be returning from [place] in two weeks.  Pt reported having pain due IV from ECT.  Writer reported to nurse who advised Pt to use heat packs before procedure and to ask doctor about pre-procedure pain medication like tylenol.  Pt also wanted confirmation that she signed a GEORGI for her mom today and that it was faxed to her psychiatrist.  Writer noticed Pt not eating much at dinner for the past three days.  Pt confirmed she is trying to diet as she has gained a lot of weight since being here.  Writer encouraged Pt to add walking to her day.

## 2020-01-07 NOTE — PROGRESS NOTES
[facility]  Psychiatric Progress Note    Length of stay (days): 42        Interim History:   The patient's care was discussed with the treatment team during the daily team meeting and/or staff's chart notes were reviewed.  Staff report:  she offered some resistance during transport time for scheduled ECT.  Appeared to display a brighter affect after ECT last week.  Over the weekend, she had seemed more future oriented however symptom intensity seemed to return by Sunday.    Depression severity scale 0-10 (10=most severe):  Today:9    She continues to endorse feeling helpless and hopeless.  Suicidal thoughts continue and she is not able to contract for safety outside the hospital.  She is tolerating her medications without side effects.  No psychotic symptoms reported.  No homicidal ideation reported.    No side effects related to ECT reported.    Energy is low, appetite is low, concentration is poor, anhedonia is moderate.         Medications:       brexpiprazole  3 mg Oral Daily     ketorolac  30 mg Intravenous Q Mon Wed Fri AM     lamoTRIgine  200 mg Oral Daily     mirtazapine  45 mg Oral At Bedtime     vortioxetine  20 mg Oral Daily          Allergies:   No Known Allergies       Labs:   No results found for this or any previous visit (from the past 24 hour(s)).       Psychiatric Examination:     /70   Pulse 62   Temp 98.2  F (36.8  C) (Oral)   Resp 16   Wt 100.6 kg (221 lb 12.8 oz)   SpO2 99%   BMI 36.91 kg/m    Weight is 221 lbs 12.8 oz  Body mass index is 36.91 kg/m .  Orthostatic Vitals     None            Appearance: awake, alert  Attitude:  cooperative  Eye Contact:  fair  Mood:  depressed  Affect:  intensity is blunted  Speech:  decreased prosody  Psychomotor Behavior:  no evidence of tardive dyskinesia, dystonia, or tics  Throught Process:  linear  Associations:  no loose associations  Thought Content:  no evidence of psychotic thought and active suicidal ideation  present  Insight:  limited  Judgement:  limited  Oriented to:  time, person, and place  Attention Span and Concentration:  limited  Recent and Remote Memory:  limited    Clinical Global Impressions  First:  Considering your total clinical experience with this particular patient population, how severe are the patient's symptoms at this time?: 7 (11/26/19 1053)  Compared to the patient's condition at the START of treatment, this patient's condition is:: 7 (11/26/19 1053)  Most recent:  Considering your total clinical experience with this particular patient population, how severe are the patient's symptoms at this time?: 7 (12/31/19 0824)  Compared to the patient's condition at the START of treatment, this patient's condition is:: 7 (12/31/19 0824)         Precautions:     Behavioral Orders   Procedures     Code 1 - Restrict to Unit     Code 2 - 1:1 Staff Supervision     For ECT only     Discontinue 1:1 attendant for suicide risk     Order Specific Question:   I have performed an in person assessment of the patient     Answer:   Based on this assessment the patient no longer requires a one on one attendant at this point in time.     Electroconvulsive therapy     Series of up to 12 treatments. Begin Date: 1/3/20     Treating Psychiatrist providing ECT:  Dr Kwon     Notified on:  Today  Patient under price truong, paperwork on chart     Electroconvulsive therapy     Series of up to 12 treatments. Begin Date: 1/3/20     Treating Psychiatrist providing ECT:  Dr Reynaldo Kwon     Notified on:  today     Fall precautions     Routine Programming     As clinically indicated     Status 15     Every 15 minutes.          DIagnoses:     Major depressive disorder-recurrent, severe  Generalized anxiety disorder  Unspecified personality disorder         Plan:     Continue ECT to address severe depressive symptoms and acute suicide risk.  Tolerating adequately.  No complications noted during the procedure.  Monitoring  response.    Continue Trintellex, mirtazapine, Rexulti for antidepressant treatment.  Monitoring response and tolerability.  Continue lamotrigine to address affective instability related to a suspected underlying personality disorder.    Psychosocial treatments to be addressed with social work consult and groups.    Legal Status: Involuntary commitment for treatment of mental illness with [facility]    Disposition: To be determined as we await improvement in mood and remission of suicidal thoughts.

## 2020-01-08 ENCOUNTER — ANESTHESIA EVENT (OUTPATIENT)
Dept: SURGERY | Facility: CLINIC | Age: 35
End: 2020-01-08

## 2020-01-08 ENCOUNTER — ANESTHESIA (OUTPATIENT)
Dept: SURGERY | Facility: CLINIC | Age: 35
End: 2020-01-08

## 2020-01-08 PROCEDURE — 25800030 ZZH RX IP 258 OP 636: Performed by: ANESTHESIOLOGY

## 2020-01-08 PROCEDURE — G0177 OPPS/PHP; TRAIN & EDUC SERV: HCPCS

## 2020-01-08 PROCEDURE — 90870 ELECTROCONVULSIVE THERAPY: CPT

## 2020-01-08 PROCEDURE — 25000132 ZZH RX MED GY IP 250 OP 250 PS 637: Performed by: PSYCHIATRY & NEUROLOGY

## 2020-01-08 PROCEDURE — 25000128 H RX IP 250 OP 636: Performed by: NURSE ANESTHETIST, CERTIFIED REGISTERED

## 2020-01-08 PROCEDURE — 25000128 H RX IP 250 OP 636: Performed by: PSYCHIATRY & NEUROLOGY

## 2020-01-08 PROCEDURE — 99232 SBSQ HOSP IP/OBS MODERATE 35: CPT | Mod: 25 | Performed by: PSYCHIATRY & NEUROLOGY

## 2020-01-08 PROCEDURE — 25000125 ZZHC RX 250: Performed by: NURSE ANESTHETIST, CERTIFIED REGISTERED

## 2020-01-08 PROCEDURE — 12400000 ZZH R&B MH

## 2020-01-08 RX ORDER — SODIUM CHLORIDE, SODIUM LACTATE, POTASSIUM CHLORIDE, CALCIUM CHLORIDE 600; 310; 30; 20 MG/100ML; MG/100ML; MG/100ML; MG/100ML
500 INJECTION, SOLUTION INTRAVENOUS CONTINUOUS
Status: DISCONTINUED | OUTPATIENT
Start: 2020-01-08 | End: 2020-01-16 | Stop reason: HOSPADM

## 2020-01-08 RX ORDER — LIDOCAINE 40 MG/G
CREAM TOPICAL
Status: DISCONTINUED | OUTPATIENT
Start: 2020-01-08 | End: 2020-01-16 | Stop reason: HOSPADM

## 2020-01-08 RX ORDER — KETOROLAC TROMETHAMINE 30 MG/ML
30 INJECTION, SOLUTION INTRAMUSCULAR; INTRAVENOUS
Status: DISCONTINUED | OUTPATIENT
Start: 2020-01-08 | End: 2020-01-08

## 2020-01-08 RX ADMIN — BREXPIPRAZOLE 3 MG: 2 TABLET ORAL at 08:07

## 2020-01-08 RX ADMIN — METHOHEXITAL SODIUM 100 MG: 500 INJECTION, POWDER, LYOPHILIZED, FOR SOLUTION INTRAMUSCULAR; INTRAVENOUS; RECTAL at 06:48

## 2020-01-08 RX ADMIN — SODIUM CHLORIDE, POTASSIUM CHLORIDE, SODIUM LACTATE AND CALCIUM CHLORIDE 500 ML: 600; 310; 30; 20 INJECTION, SOLUTION INTRAVENOUS at 06:28

## 2020-01-08 RX ADMIN — VORTIOXETINE 20 MG: 20 TABLET, FILM COATED ORAL at 08:08

## 2020-01-08 RX ADMIN — LAMOTRIGINE 200 MG: 100 TABLET ORAL at 08:08

## 2020-01-08 RX ADMIN — SUCCINYLCHOLINE CHLORIDE 100 MG: 20 INJECTION, SOLUTION INTRAMUSCULAR; INTRAVENOUS; PARENTERAL at 06:48

## 2020-01-08 RX ADMIN — MIRTAZAPINE 45 MG: 45 TABLET, FILM COATED ORAL at 20:55

## 2020-01-08 RX ADMIN — KETOROLAC TROMETHAMINE 30 MG: 30 INJECTION, SOLUTION INTRAMUSCULAR at 06:30

## 2020-01-08 ASSESSMENT — LIFESTYLE VARIABLES: TOBACCO_USE: 0

## 2020-01-08 NOTE — ANESTHESIA POSTPROCEDURE EVALUATION
Patient: Misty Parham    * No procedures listed *    Diagnosis:* No pre-op diagnosis entered *  Diagnosis Additional Information: No value filed.    Anesthesia Type:  General    Note:  Anesthesia Post Evaluation    Patient location during evaluation: Bedside  Patient participation: Able to fully participate in evaluation  Level of consciousness: awake and alert  Pain management: adequate  Airway patency: patent  Cardiovascular status: acceptable  Respiratory status: acceptable  Hydration status: acceptable  PONV: none             Last vitals:  Vitals:    01/08/20 0720 01/08/20 0725 01/08/20 0730   BP: 104/73 110/77 107/76   Pulse: 74 80 70   Resp: 16 12 16   Temp:      SpO2: 97% 97% 98%         Electronically Signed By: Pedro Finley MD  January 8, 2020  7:39 AM

## 2020-01-08 NOTE — PROCEDURES
[facility] ECT Procedure Note    I have reviewed the Findings of the Fact, Conclusion of Law and Order Authorizing Electroconvulsive Therapy signed by the court on December 27, 2019.  Under this order the head of the hospital is authorized to administer up to three (3) treatments per week for a period of two (2) weeks followed by up to two (2) treatments per week for the duration of the commitment expiring on June 18, 2020 unless there is subsequent order by the court pertaining to this matter      Misty Mckeon Davidne [id number]   34 year old 1985     Patient Status: Inpatient    No Known Allergies    Weight:  225 lbs 4.8 oz              Diagnosis:   Major depression       Indications for ECT:   History of good ECT response in one or more previous episodes of illness       Pause for the Cause:     Right patient Yes   Right procedure/laterality settings: Yes   Right diagnosis Yes          Intra-Procedure Documentation:     Date:  1/8/2020  Time:  6:44 AM    ECT #    Treatment number this series: 3   Total treatment number: 3   Type of ECT:  Bilateral, standard    ECT Medications administered: Brevital: 100mg  Succinyl Choline: 100mg         Clinical Narrative:     ECT was administered by Thymatron machine.  Pt has been medically cleared for procedure, consent signed. Side effects, Risks and benefits reviewed.    ECT Strip Summary:   Energy Level: 60 percent  Motor Seizure Duration: 38 seconds  EEG Seizure Duration: 41 seconds    Complications: Yes Travis Sr and Lexie    Plan: 4th ECT 1/10/20  Outpatient Psychiatrist:Drew Juarez NP

## 2020-01-08 NOTE — PROGRESS NOTES
I met with pt regarding her contacting her employer.    She asked me to get the phone number for her.    So I found it and gave it to her and she said she'd probably call later.

## 2020-01-08 NOTE — PROGRESS NOTES
called and left a message for pt's [employer] CM, Samreen Machuca.    I gave her a general update, particularly in relation as to how pt is doing with the ECT tx.

## 2020-01-08 NOTE — PROGRESS NOTES
01/08/20 1500   General Information   Date Initially Attended OT 01/08/20     Pt attended 1 of 2 OT groups today. Pt transitioned to OT group with MIN encouragement and engaged in therapeutic activity addressing functional cognition and interpersonal skills with direct VCs. With direct prompts, pt participated in therapeutic group activity and discussion addressing healthy habits. Educated pt on healthy habits of sleep hygiene, diet, exercise, and social/leisure engagement and how these impact mental health. Pt ID'd cooking healthy meals with her son as a health change she plans to make for improved wellness. More observation needed to complete initial evaluation at this time. Pt will continue to benefit from OT intervention to address implementation of positive functional coping skills, role performance, and community reintegration.

## 2020-01-08 NOTE — ANESTHESIA CARE TRANSFER NOTE
Patient: Misty Parham    * No procedures listed *    Diagnosis: * No pre-op diagnosis entered *  Diagnosis Additional Information: No value filed.    Anesthesia Type:   General     Note:  Airway :Nasal Cannula  Patient transferred to:PACU  Comments: Native airway general anesthetic.  Patient hyperventilated with 100% oxygen via mask prior to treatment.   Anesthesia induced using patent peripheral IV.    Bite block placed between molars to protect teeth and tongue.     After induction of seizure patient mask ventilated with 100% oxygen until spontaneous respirations returned.     At time of handoff to PACU, patient exhibited spontaneous respirations, adequate tidal volumes, airway patent. Oxygen via nasal cannula at 4 liters per minute to PACU in cart with siderails up, connected to wall O2 in PACU. All monitors and alarms on and functioning. Report given to PACU RN and questions answered. Handoff Report: Identifed the Patient, Identified the Reponsible Provider, Reviewed the pertinent medical history, Discussed the surgical course, Reviewed Intra-OP anesthesia mangement and issues during anesthesia, Set expectations for post-procedure period and Allowed opportunity for questions and acknowledgement of understanding      Vitals: (Last set prior to Anesthesia Care Transfer)    CRNA VITALS  1/8/2020 0626 - 1/8/2020 0656      1/8/2020             Pulse:  78    SpO2:  100 %    Resp Rate (observed):  12    Resp Rate (set):  10                Electronically Signed By: WILLIAN Longoria CRNA  January 8, 2020  6:56 AM

## 2020-01-08 NOTE — PROGRESS NOTES
[facility]  Psychiatric Progress Note    Length of stay (days): 43        Interim History:   The patient's care was discussed with the treatment team during the daily team meeting and/or staff's chart notes were reviewed.  Staff report: No acute issues overnight.    Depression severity scale 0-10 (10=most severe):  Today: 7  She reports noting a slight improvement in her mood.    She continues to endorse feeling helpless and hopeless.  She is concerned that despite gaining improvements here, symptoms will return after being discharged from the hospital.  Her concern is regarding maintaining a positive response to her current medications.    Suicidal thoughts continue and she is not able to contract for safety outside the hospital.  She is tolerating her medications without side effects.  No psychotic symptoms reported.  No homicidal ideation reported.    No side effects related to ECT reported.    Energy is low, appetite is low, concentration is poor, anhedonia is moderate.         Medications:       brexpiprazole  3 mg Oral Daily     ketorolac  30 mg Intravenous Q Mon Wed Fri AM     lamoTRIgine  200 mg Oral Daily     mirtazapine  45 mg Oral At Bedtime     vortioxetine  20 mg Oral Daily          Allergies:   No Known Allergies       Labs:   No results found for this or any previous visit (from the past 24 hour(s)).       Psychiatric Examination:     /75   Pulse 74   Temp 98.5  F (36.9  C) (Oral)   Resp 16   Wt 102.2 kg (225 lb 4.8 oz)   SpO2 100%   BMI 37.49 kg/m    Weight is 225 lbs 4.8 oz  Body mass index is 37.49 kg/m .  Orthostatic Vitals     None            Appearance: awake, alert  Attitude:  cooperative  Eye Contact:  fair  Mood:  depressed  Affect:  intensity is blunted  Speech:  decreased prosody  Psychomotor Behavior:  no evidence of tardive dyskinesia, dystonia, or tics  Throught Process:  linear  Associations:  no loose associations  Thought Content:  no evidence of psychotic  thought and active suicidal ideation present  Insight:  limited  Judgement:  limited  Oriented to:  time, person, and place  Attention Span and Concentration:  limited  Recent and Remote Memory:  limited    Clinical Global Impressions  First:  Considering your total clinical experience with this particular patient population, how severe are the patient's symptoms at this time?: 7 (11/26/19 1053)  Compared to the patient's condition at the START of treatment, this patient's condition is:: 7 (11/26/19 1053)  Most recent:  Considering your total clinical experience with this particular patient population, how severe are the patient's symptoms at this time?: 7 (12/31/19 0824)  Compared to the patient's condition at the START of treatment, this patient's condition is:: 7 (12/31/19 0824)         Precautions:     Behavioral Orders   Procedures     Code 1 - Restrict to Unit     Code 2 - 1:1 Staff Supervision     For ECT only     Discontinue 1:1 attendant for suicide risk     Order Specific Question:   I have performed an in person assessment of the patient     Answer:   Based on this assessment the patient no longer requires a one on one attendant at this point in time.     Electroconvulsive therapy     Series of up to 12 treatments. Begin Date: 1/3/20     Treating Psychiatrist providing ECT:  Dr Kwon     Notified on:  Today  Patient under price truong, paperwork on chart     Electroconvulsive therapy     Series of up to 12 treatments. Begin Date: 1/3/20     Treating Psychiatrist providing ECT:  Dr Reynaldo Kwon     Notified on:  today     Fall precautions     Routine Programming     As clinically indicated     Status 15     Every 15 minutes.          DIagnoses:     Major depressive disorder-recurrent, severe  Generalized anxiety disorder  Unspecified personality disorder         Plan:     Continue ECT to address severe depressive symptoms and acute suicide risk.  Tolerating adequately.  No complications noted during the  procedure.  Monitoring response.    I have requested records from her outpatient provider to allow a more detailed review of prior medication trials.  We discussed initiating either Effexor or Pristiq as her main antidepressant to replace Trintellix.    Continue lamotrigine to address affective instability related to a suspected underlying personality disorder.    Psychosocial treatments to be addressed with social work consult and groups.    Legal Status: Involuntary commitment for treatment of mental illness with Travis Andersen    Disposition: To be determined as we await improvement in mood and remission of suicidal thoughts.

## 2020-01-08 NOTE — PLAN OF CARE
Sad affect, but endorses feeling better. Had ECT # 3 this morning with no side effects noted. Attended morning group and has been more visible today. States that she still is concerned that the ECT effect will wear off but is hopeful about the future.Trying to keep in touch with her children every day, because she misses them. Denies SI. Med compliant

## 2020-01-08 NOTE — PROGRESS NOTES
[facility]  Psychiatric Progress Note    Length of stay (days): 44        Interim History:   The patient's care was discussed with the treatment team during the daily team meeting and/or staff's chart notes were reviewed.  Staff report: No acute issues overnight.    Depression severity scale 0-10 (10=most severe):  Today: 7  She reports noting a slight improvement in her mood.    Today, she reports that she is beginning to miss her children.  She has 3 children ranging from adult age to a young teenager, all of whom reside with her.  She is currently staying with her mother while  from her  which has been an ongoing stressor.  She describes her mother as an alcoholic.  The patient frequently has financial conflicts with her .  Due to limited finances, she has not been able to find independent housing.    Suicidal thoughts continue and she is not able to contract for safety outside the hospital.  She is tolerating her medications without side effects.  No psychotic symptoms reported.  No homicidal ideation reported.    No side effects related to ECT reported.    Energy is low, appetite is low, concentration is poor, anhedonia is moderate.         Medications:       brexpiprazole  3 mg Oral Daily     ketorolac  30 mg Intravenous Q Mon Wed Fri AM     lamoTRIgine  200 mg Oral Daily     mirtazapine  45 mg Oral At Bedtime     sodium chloride (PF)  3 mL Intracatheter Q8H     vortioxetine  20 mg Oral Daily          Allergies:   No Known Allergies       Labs:   No results found for this or any previous visit (from the past 24 hour(s)).       Psychiatric Examination:     /83   Pulse 70   Temp 98  F (36.7  C) (Oral)   Resp 16   Wt 102.2 kg (225 lb 4.8 oz)   SpO2 97%   BMI 37.49 kg/m    Weight is 225 lbs 4.8 oz  Body mass index is 37.49 kg/m .  Orthostatic Vitals     None            Appearance: awake, alert  Attitude:  cooperative  Eye Contact:  fair  Mood:  depressed  Affect:   intensity is blunted  Speech:  decreased prosody  Psychomotor Behavior:  no evidence of tardive dyskinesia, dystonia, or tics  Throught Process:  linear  Associations:  no loose associations  Thought Content:  no evidence of psychotic thought and active suicidal ideation present  Insight:  limited  Judgement:  limited  Oriented to:  time, person, and place  Attention Span and Concentration:  limited  Recent and Remote Memory:  limited    Clinical Global Impressions  First:  Considering your total clinical experience with this particular patient population, how severe are the patient's symptoms at this time?: 7 (11/26/19 1053)  Compared to the patient's condition at the START of treatment, this patient's condition is:: 7 (11/26/19 1053)  Most recent:  Considering your total clinical experience with this particular patient population, how severe are the patient's symptoms at this time?: 7 (12/31/19 0824)  Compared to the patient's condition at the START of treatment, this patient's condition is:: 7 (12/31/19 0824)         Precautions:     Behavioral Orders   Procedures     Code 1 - Restrict to Unit     Code 2 - 1:1 Staff Supervision     For ECT only     Discontinue 1:1 attendant for suicide risk     Order Specific Question:   I have performed an in person assessment of the patient     Answer:   Based on this assessment the patient no longer requires a one on one attendant at this point in time.     Electroconvulsive therapy     Series of up to 12 treatments. Begin Date: 1/3/20     Treating Psychiatrist providing ECT:  Dr Reynaldo Kwon     Notified on:  today     Electroconvulsive therapy     Series of up to 12 treatments. Begin Date: 1/3/20     Treating Psychiatrist providing ECT:  Dr Kwon     Notified on:  Today  Patient under shayy truong, paperwork on chart     Fall precautions     Shayy Gordon     Routine Programming     As clinically indicated     Status 15     Every 15 minutes.          DIagnoses:     Major  depressive disorder-recurrent, severe  Generalized anxiety disorder  Unspecified personality disorder         Plan:     Continue ECT to address severe depressive symptoms and acute suicide risk.  Tolerating adequately.  No complications noted during the procedure.  Monitoring response.  As per the Travis Andersen order, after 6 treatments, frequency will be reduced to twice a week for maintenance treatment.    I called her outpatient clinic today to again request receipt of outpatient records outlining prior medication trials.  They will fax documentation later today.  Anticipate starting either Effexor or Pristiq to replace Trintellix and better address depressive symptoms after reviewing documentation.    Psychosocial treatments to be addressed with social work consult and groups.  We discussed the possibility of intensive residential treatment programming however the patient does not have a funding source for this treatment mode.  Consider referral for IOP if the patient is able to accommodate with her work schedule.    Legal Status: Involuntary commitment for treatment of mental illness with Travis Andersen    Disposition: To be determined as we await improvement in mood and remission of suicidal thoughts.

## 2020-01-08 NOTE — PLAN OF CARE
"Shift Update: Pt presents with a depressed, but calm mood and a sad affect Thought process is poor and insight is limited. Pt was withdrawn and isolative during shift, but did spend more time in the lounge watching other pts play Wii and watching TV after dinner. Pt spent time reading in her room and resting for the remainder of the shift. Pt stated that she is feeling better after starting the ECT, but complained that \" it never lasts\". Pt was reminded that she needs to be medication compliant after the ECT as well. Pt denies suicidal ideation during this shift.  ECT# 3 Wed.     "

## 2020-01-09 PROCEDURE — 99232 SBSQ HOSP IP/OBS MODERATE 35: CPT | Performed by: PSYCHIATRY & NEUROLOGY

## 2020-01-09 PROCEDURE — 12400000 ZZH R&B MH

## 2020-01-09 PROCEDURE — 25000132 ZZH RX MED GY IP 250 OP 250 PS 637: Performed by: PSYCHIATRY & NEUROLOGY

## 2020-01-09 RX ORDER — VENLAFAXINE HYDROCHLORIDE 150 MG/1
150 CAPSULE, EXTENDED RELEASE ORAL
Status: DISCONTINUED | OUTPATIENT
Start: 2020-01-13 | End: 2020-01-15

## 2020-01-09 RX ORDER — VENLAFAXINE 37.5 MG/1
37.5 TABLET ORAL ONCE
Status: COMPLETED | OUTPATIENT
Start: 2020-01-09 | End: 2020-01-09

## 2020-01-09 RX ORDER — VENLAFAXINE HYDROCHLORIDE 75 MG/1
75 CAPSULE, EXTENDED RELEASE ORAL
Status: COMPLETED | OUTPATIENT
Start: 2020-01-10 | End: 2020-01-12

## 2020-01-09 RX ADMIN — BREXPIPRAZOLE 3 MG: 2 TABLET ORAL at 09:07

## 2020-01-09 RX ADMIN — LAMOTRIGINE 200 MG: 100 TABLET ORAL at 09:07

## 2020-01-09 RX ADMIN — VENLAFAXINE HYDROCHLORIDE 37.5 MG: 37.5 TABLET ORAL at 12:59

## 2020-01-09 RX ADMIN — VORTIOXETINE 20 MG: 20 TABLET, FILM COATED ORAL at 09:07

## 2020-01-09 RX ADMIN — MIRTAZAPINE 45 MG: 45 TABLET, FILM COATED ORAL at 21:06

## 2020-01-09 ASSESSMENT — ACTIVITIES OF DAILY LIVING (ADL)
LAUNDRY: WITH SUPERVISION
ORAL_HYGIENE: INDEPENDENT
ORAL_HYGIENE: INDEPENDENT
DRESS: INDEPENDENT
DRESS: INDEPENDENT
HYGIENE/GROOMING: INDEPENDENT
HYGIENE/GROOMING: INDEPENDENT

## 2020-01-09 NOTE — PLAN OF CARE
Pt had ECT # 3 this morning reports she is having short term memory loss but no other side effects. She states she is still unhappy about having to have ECT but states she is feeling better and is hopeful maintenance outpatient will help her maintain improvement long term. She spent time in the lounge watching tv but did not interact with peers. Pt also spent time in her room reading, she did not attend any groups. Pt presents with a flat affect and is social when engaged

## 2020-01-09 NOTE — PLAN OF CARE
Problem: Adult Behavioral Health Plan of Care  Goal: Patient-Specific Goal (Individualization)  Description  1. Absence of suicidal ideation with safety plan in place.  2. Effective medication regime with patient compliance.  3. Stabilization of mood and thought processes.  4. Improved insight into mental health issues.  5. Able to identify and utilize positive coping skills.  6. Plan in place for ongoing treatment and support.     Outcome: No Change  Note:   Pt has been isolative to her room bed resting. Pt denies any SI and acknowledges her depression symptoms have improved. Pt though remains anxious. Pt talked with staff about her tx plan and her ability to function outside of the hospital. Staff spoke to pt about possibly doing maintenance ECT and following up with another IOP. Pt was agreeable to this but does have some concerns about transportation. Pt does have a mother and  who may be able to transport her for her tx. Pt is pleasant and cooperative.

## 2020-01-10 ENCOUNTER — ANESTHESIA (OUTPATIENT)
Dept: SURGERY | Facility: CLINIC | Age: 35
End: 2020-01-10

## 2020-01-10 ENCOUNTER — ANESTHESIA EVENT (OUTPATIENT)
Dept: SURGERY | Facility: CLINIC | Age: 35
End: 2020-01-10

## 2020-01-10 PROCEDURE — 25000132 ZZH RX MED GY IP 250 OP 250 PS 637: Performed by: PSYCHIATRY & NEUROLOGY

## 2020-01-10 PROCEDURE — 25000125 ZZHC RX 250: Performed by: NURSE ANESTHETIST, CERTIFIED REGISTERED

## 2020-01-10 PROCEDURE — 25800030 ZZH RX IP 258 OP 636: Performed by: ANESTHESIOLOGY

## 2020-01-10 PROCEDURE — 90870 ELECTROCONVULSIVE THERAPY: CPT

## 2020-01-10 PROCEDURE — 25000128 H RX IP 250 OP 636: Performed by: NURSE ANESTHETIST, CERTIFIED REGISTERED

## 2020-01-10 PROCEDURE — 25000128 H RX IP 250 OP 636: Performed by: PSYCHIATRY & NEUROLOGY

## 2020-01-10 PROCEDURE — 12400000 ZZH R&B MH

## 2020-01-10 RX ORDER — NALOXONE HYDROCHLORIDE 0.4 MG/ML
.1-.4 INJECTION, SOLUTION INTRAMUSCULAR; INTRAVENOUS; SUBCUTANEOUS
Status: ACTIVE | OUTPATIENT
Start: 2020-01-10 | End: 2020-01-11

## 2020-01-10 RX ORDER — KETOROLAC TROMETHAMINE 30 MG/ML
30 INJECTION, SOLUTION INTRAMUSCULAR; INTRAVENOUS
Status: DISCONTINUED | OUTPATIENT
Start: 2020-01-10 | End: 2020-01-16

## 2020-01-10 RX ADMIN — VENLAFAXINE HYDROCHLORIDE 75 MG: 75 CAPSULE, EXTENDED RELEASE ORAL at 08:45

## 2020-01-10 RX ADMIN — LAMOTRIGINE 200 MG: 100 TABLET ORAL at 08:45

## 2020-01-10 RX ADMIN — KETOROLAC TROMETHAMINE 30 MG: 30 INJECTION, SOLUTION INTRAMUSCULAR at 06:21

## 2020-01-10 RX ADMIN — MIRTAZAPINE 45 MG: 45 TABLET, FILM COATED ORAL at 20:40

## 2020-01-10 RX ADMIN — SUCCINYLCHOLINE CHLORIDE 100 MG: 20 INJECTION, SOLUTION INTRAMUSCULAR; INTRAVENOUS; PARENTERAL at 06:50

## 2020-01-10 RX ADMIN — SODIUM CHLORIDE, POTASSIUM CHLORIDE, SODIUM LACTATE AND CALCIUM CHLORIDE 500 ML: 600; 310; 30; 20 INJECTION, SOLUTION INTRAVENOUS at 06:19

## 2020-01-10 RX ADMIN — METHOHEXITAL SODIUM 100 MG: 500 INJECTION, POWDER, LYOPHILIZED, FOR SOLUTION INTRAMUSCULAR; INTRAVENOUS; RECTAL at 06:50

## 2020-01-10 RX ADMIN — BREXPIPRAZOLE 3 MG: 2 TABLET ORAL at 08:45

## 2020-01-10 ASSESSMENT — ACTIVITIES OF DAILY LIVING (ADL)
HYGIENE/GROOMING: INDEPENDENT
LAUNDRY: WITH SUPERVISION
ORAL_HYGIENE: INDEPENDENT
LAUNDRY: WITH SUPERVISION
ORAL_HYGIENE: INDEPENDENT
DRESS: INDEPENDENT
HYGIENE/GROOMING: INDEPENDENT
DRESS: INDEPENDENT

## 2020-01-10 ASSESSMENT — LIFESTYLE VARIABLES: TOBACCO_USE: 0

## 2020-01-10 NOTE — PROGRESS NOTES
[facility]  Psychiatric Progress Note    Length of stay (days): 45        Interim History:   The patient's care was discussed with the treatment team during the daily team meeting and/or staff's chart notes were reviewed.  Staff report: No acute issues overnight.    Depression severity scale 0-10 (10=most severe):  Today: 8    Suicidal thoughts continue and she is not able to contract for safety outside the hospital.  She is tolerating her medications without side effects.  No psychotic symptoms reported.  No homicidal ideation reported.    No side effects related to ECT reported.    Energy is low, appetite is low, concentration is poor, anhedonia is moderate.         Medications:       brexpiprazole  3 mg Oral Daily     ketorolac  30 mg Intravenous Q Mon Wed Fri AM     lamoTRIgine  200 mg Oral Daily     mirtazapine  45 mg Oral At Bedtime     sodium chloride (PF)  3 mL Intracatheter Q8H     [START ON 1/13/2020] venlafaxine  150 mg Oral Daily with breakfast     [START ON 1/10/2020] venlafaxine  75 mg Oral Daily with breakfast          Allergies:   No Known Allergies       Labs:   No results found for this or any previous visit (from the past 24 hour(s)).       Psychiatric Examination:     BP 96/62   Pulse 67   Temp 97.9  F (36.6  C) (Oral)   Resp 16   Wt 102.2 kg (225 lb 4.8 oz)   SpO2 98%   BMI 37.49 kg/m    Weight is 225 lbs 4.8 oz  Body mass index is 37.49 kg/m .  Orthostatic Vitals     None            Appearance: awake, alert  Attitude:  cooperative  Eye Contact:  fair  Mood:  depressed  Affect:  intensity is blunted  Speech:  decreased prosody  Psychomotor Behavior:  no evidence of tardive dyskinesia, dystonia, or tics  Throught Process:  linear  Associations:  no loose associations  Thought Content:  no evidence of psychotic thought and active suicidal ideation present  Insight:  limited  Judgement:  limited  Oriented to:  time, person, and place  Attention Span and Concentration:   limited  Recent and Remote Memory:  limited    Clinical Global Impressions  First:  Considering your total clinical experience with this particular patient population, how severe are the patient's symptoms at this time?: 7 (11/26/19 1053)  Compared to the patient's condition at the START of treatment, this patient's condition is:: 7 (11/26/19 1053)  Most recent:  Considering your total clinical experience with this particular patient population, how severe are the patient's symptoms at this time?: 7 (12/31/19 0824)  Compared to the patient's condition at the START of treatment, this patient's condition is:: 7 (12/31/19 0824)         Precautions:     Behavioral Orders   Procedures     Code 1 - Restrict to Unit     Code 2 - 1:1 Staff Supervision     For ECT only     Discontinue 1:1 attendant for suicide risk     Order Specific Question:   I have performed an in person assessment of the patient     Answer:   Based on this assessment the patient no longer requires a one on one attendant at this point in time.     Electroconvulsive therapy     Series of up to 12 treatments. Begin Date: 1/3/20     Treating Psychiatrist providing ECT:  Dr Reynaldo Kwon     Notified on:  today     Electroconvulsive therapy     Series of up to 12 treatments. Begin Date: 1/3/20     Treating Psychiatrist providing ECT:  Dr Kwon     Notified on:  Today  Patient under shayy truong, paperwork on chart     Fall precautions     Shayy Gordon     Routine Programming     As clinically indicated     Status 15     Every 15 minutes.          DIagnoses:     Major depressive disorder-recurrent, severe  Generalized anxiety disorder  Unspecified personality disorder         Plan:     Continue ECT to address severe depressive symptoms and acute suicide risk.  Tolerating adequately.  No complications noted during the procedure.  Monitoring response.  As per the Shayy Andersen order, after 6 treatments, frequency will be reduced to twice a week for  maintenance treatment.    Outpatient records were reviewed today, with detailed focus on past medication trials.  No prior trials of Effexor, Cymbalta, or Pristiq noted.  Effexor will be initiated today and titrated up after 3 days.  Trintellix will be discontinued.    Psychosocial treatments to be addressed with social work consult and groups.  We discussed the possibility of intensive residential treatment programming however the patient does not have a funding source for this treatment mode.  Consider referral for IOP if the patient is able to accommodate with her work schedule.    Legal Status: Involuntary commitment for treatment of mental illness with Robles Andersen    Disposition: To be determined as we await improvement in mood and remission of suicidal thoughts.

## 2020-01-10 NOTE — ANESTHESIA PREPROCEDURE EVALUATION
Anesthesia Pre-Procedure Evaluation    Patient: Misty Parham   MRN: [id number] : 1985          Preoperative Diagnosis: * No pre-op diagnosis entered *    * No procedures listed *    Past Medical History:   Diagnosis Date     Depressive disorder      Past Surgical History:   Procedure Laterality Date     CHOLECYSTECTOMY         Anesthesia Evaluation     . Pt has had prior anesthetic. Type: General    No history of anesthetic complications          ROS/MED HX    ENT/Pulmonary:      (-) tobacco use and sleep apnea   Neurologic:      (-) Delerium   Cardiovascular:     (+) ----. : . . . :. . Previous cardiac testing date:results:date: results:ECG reviewed date:19 results:NSR date: results:          METS/Exercise Tolerance:  >4 METS   Hematologic:         Musculoskeletal:         GI/Hepatic:        (-) GERD   Renal/Genitourinary:         Endo:     (+) Obesity (BMI 37), .      Psychiatric:     (+) psychiatric history (severe, requring ECT) depression      Infectious Disease:         Malignancy:         Other:    (+) No chance of pregnancy                           Physical Exam  Normal systems: dental    Airway   Mallampati: III  TM distance: >3 FB  Neck ROM: full  Comment: Poor effort at mouth opening    Dental     Cardiovascular   Rhythm and rate: regular      Pulmonary    breath sounds clear to auscultation            Lab Results   Component Value Date    WBC 6.6 2019    HGB 13.0 2019    HCT 39.9 2019     2019     2019    POTASSIUM 3.8 2019    CHLORIDE 111 (H) 2019    CO2 28 2019    BUN 13 2019    CR 0.84 2019    GLC 90 2019    CRISTINE 8.8 2019    ALBUMIN 3.7 2019    PROTTOTAL 7.8 2019    ALT 21 2019    AST 20 2019    ALKPHOS 76 2019    BILITOTAL 0.4 2019    TSH 1.61 2019    HCG Negative 2019       Preop Vitals  BP Readings from Last 3 Encounters:   01/10/20 115/78   10/23/19  "121/84   09/20/19 107/68    Pulse Readings from Last 3 Encounters:   01/10/20 72   10/23/19 85   09/20/19 80      Resp Readings from Last 3 Encounters:   01/10/20 14   10/23/19 16   09/20/19 16    SpO2 Readings from Last 3 Encounters:   01/10/20 94%   10/23/19 98%   09/20/19 96%      Temp Readings from Last 1 Encounters:   01/10/20 36.8  C (98.2  F) (Temporal)    Ht Readings from Last 1 Encounters:   10/07/19 1.651 m (5' 5\")      Wt Readings from Last 1 Encounters:   01/07/20 102.2 kg (225 lb 4.8 oz)    Estimated body mass index is 37.49 kg/m  as calculated from the following:    Height as of 10/7/19: 1.651 m (5' 5\").    Weight as of 1/7/20: 102.2 kg (225 lb 4.8 oz).       Anesthesia Plan      History & Physical Review  History and physical reviewed and following examination; no interval change.    ASA Status:  2 .        Plan for General (Mask ventilation) with Intravenous induction.   PONV prophylaxis:  Ondansetron (or other 5HT-3)       Postoperative Care      Consents  Anesthetic plan, risks, benefits and alternatives discussed with:  Patient..                   OCTAVIO LARSEN MD  "

## 2020-01-10 NOTE — PROCEDURES
[facility] ECT Procedure Note    I have reviewed the Findings of the Fact, Conclusion of Law and Order Authorizing Electroconvulsive Therapy signed by the court on December 27, 2019.  Under this order the head of the hospital is authorized to administer up to three (3) treatments per week for a period of two (2) weeks followed by up to two (2) treatments per week for the duration of the commitment expiring on June 18, 2020 unless there is subsequent order by the court pertaining to this matter      Misty Mckeon Davidne [id number]   34 year old 1985     Patient Status: Inpatient    No Known Allergies    Weight:  225 lbs 4.8 oz              Diagnosis:   Major depression       Indications for ECT:   History of good ECT response in one or more previous episodes of illness       Pause for the Cause:     Right patient Yes   Right procedure/laterality settings: Yes   Right diagnosis Yes          Intra-Procedure Documentation:     Date:  1/10/2020  Time:  6:44 AM    ECT #    Treatment number this series: 4   Total treatment number: 4   Type of ECT:  Bilateral, standard    ECT Medications administered: Brevital: 100mg  Succinyl Choline: 100mg         Clinical Narrative:     ECT was administered by Thymatron machine.  Pt has been medically cleared for procedure, consent signed. Side effects, Risks and benefits reviewed.    ECT Strip Summary:   Energy Level: 65 percent  Motor Seizure Duration: 38 seconds  EEG Seizure Duration: 41 seconds    Complications: Yes Travis Sr and Lexie    Plan: 5th ECT 1/13/20  Outpatient Psychiatrist:Drew Juarez NP

## 2020-01-10 NOTE — ANESTHESIA POSTPROCEDURE EVALUATION
Patient: Misty Parham    * No procedures listed *    Diagnosis:* No pre-op diagnosis entered *  Diagnosis Additional Information: No value filed.    Anesthesia Type:  General    Note:  Anesthesia Post Evaluation    Patient location during evaluation: PACU  Patient participation: Able to fully participate in evaluation  Level of consciousness: awake  Pain management: adequate  Airway patency: patent  Cardiovascular status: acceptable  Respiratory status: acceptable  Hydration status: acceptable  PONV: none     Anesthetic complications: None          Last vitals:  Vitals:    01/10/20 0700 01/10/20 0705 01/10/20 0710   BP: 100/63 100/70 97/55   Pulse: 71     Resp: 10 13 12   Temp:  36.9  C (98.4  F)    SpO2: 99% 98% 97%         Electronically Signed By: OCTAVIO LARSEN MD  January 10, 2020  7:18 AM

## 2020-01-10 NOTE — PLAN OF CARE
"Pt mood is sad and depressed, with flat affect. Brighter upon approach. Pt has been visible on the unit and attended unit programing this evening. Acknowledges that ECT is helping, but says she thinks \"it's only temporary.\"Insight continues to be poor. Pt continues to endorse suicidal ideation. Overall, patient is pleasant and cooperative. Plan is to start Effexor and d/c Trintellix. Will have ECT #4 tomorrow 1/10 morning.  "

## 2020-01-10 NOTE — PLAN OF CARE
Problem: Depressive Symptoms  Goal: Depressive Symptoms  Description  Signs and symptoms of listed problems will be absent or manageable.  Outcome: Improving  Flowsheets (Taken 1/10/2020 4753)  Depressive Symptoms Assessed: all  Depressive Symptoms Present: affect; other (see comment); insight  Note:   Pt presents with a flat affect and stable mood. Pt was more visible on the unit and appeared brighter upon approach. She states that she has noticed some improvements and feels the ECT is helping. Pt's insight is improving, judgement remains impaired. Since receiving Toradol before ECT pt reports that her headaches have diminished. She denies SI and is med-compliant.

## 2020-01-11 PROCEDURE — 25000132 ZZH RX MED GY IP 250 OP 250 PS 637: Performed by: PSYCHIATRY & NEUROLOGY

## 2020-01-11 PROCEDURE — 12400000 ZZH R&B MH

## 2020-01-11 RX ADMIN — LAMOTRIGINE 200 MG: 100 TABLET ORAL at 08:54

## 2020-01-11 RX ADMIN — VENLAFAXINE HYDROCHLORIDE 75 MG: 75 CAPSULE, EXTENDED RELEASE ORAL at 08:54

## 2020-01-11 RX ADMIN — BREXPIPRAZOLE 3 MG: 2 TABLET ORAL at 08:54

## 2020-01-11 RX ADMIN — MIRTAZAPINE 45 MG: 45 TABLET, FILM COATED ORAL at 20:50

## 2020-01-11 ASSESSMENT — ACTIVITIES OF DAILY LIVING (ADL)
ORAL_HYGIENE: INDEPENDENT
DRESS: INDEPENDENT
ORAL_HYGIENE: INDEPENDENT
HYGIENE/GROOMING: INDEPENDENT
LAUNDRY: WITH SUPERVISION
LAUNDRY: WITH SUPERVISION
HYGIENE/GROOMING: INDEPENDENT
DRESS: INDEPENDENT

## 2020-01-11 NOTE — PLAN OF CARE
Problem: Depressive Symptoms  Goal: Depressive Symptoms  Description  Signs and symptoms of listed problems will be absent or manageable.  1/11/2020 1444 by Ivana Serrano  Outcome: Improving  Flowsheets  Taken 1/11/2020 1442  Depressive Symptoms Assessed: all  Taken 1/11/2020 1444  Depressive Symptoms Present: affect  Note:   Pt presents with a flat affect, although brighter than it has been in the recent past, and calm but withdrawn mood. Pt thought process and insight is improving. Pt was more visible on the unit this shift, attended community meeting stating her goal for today was to call her sons. Pt was otherwise seen in the lounge watching TV, posture and affect appearing brighter and less depressed. Pt states she is continuing to feel better and believes the ECTs are helping, but is concerned that once she is done with ECT, the effects will wear off and her Si will come back. Pt denies current Si.

## 2020-01-11 NOTE — PLAN OF CARE
Problem: Depressive Symptoms  Goal: Depressive Symptoms  Description  Signs and symptoms of listed problems will be absent or manageable.  1/10/2020 1808 by Jacinta Friedman RN  Flowsheets (Taken 1/10/2020 1808)  Depressive Symptoms Assessed: thought process; sleep; mood; affect  Depressive Symptoms Present: thought process; affect; mood; sleep  Note:   Pt states that she is feeling better each day with the ECT series. Is afraid that it will not last long after it is over. States that she is hoping that the doctor will have her do an outpatient maintenance series to help with her depression. Pt states that she is worried that she might lose her job at [employer] because she was told that they are not receiving the leave of absence paperwork that was faxed from the doctor. Pt spoke about her children, her plans after discharged. Will continue to monitor for safety.

## 2020-01-12 PROCEDURE — 25000132 ZZH RX MED GY IP 250 OP 250 PS 637: Performed by: PSYCHIATRY & NEUROLOGY

## 2020-01-12 PROCEDURE — 12400000 ZZH R&B MH

## 2020-01-12 RX ADMIN — VENLAFAXINE HYDROCHLORIDE 75 MG: 75 CAPSULE, EXTENDED RELEASE ORAL at 09:16

## 2020-01-12 RX ADMIN — BREXPIPRAZOLE 3 MG: 2 TABLET ORAL at 09:16

## 2020-01-12 RX ADMIN — MIRTAZAPINE 45 MG: 45 TABLET, FILM COATED ORAL at 20:47

## 2020-01-12 RX ADMIN — LAMOTRIGINE 200 MG: 100 TABLET ORAL at 09:16

## 2020-01-12 ASSESSMENT — ACTIVITIES OF DAILY LIVING (ADL)
LAUNDRY: WITH SUPERVISION
DRESS: INDEPENDENT
ORAL_HYGIENE: INDEPENDENT
LAUNDRY: UNABLE TO COMPLETE
HYGIENE/GROOMING: INDEPENDENT
ORAL_HYGIENE: INDEPENDENT
DRESS: INDEPENDENT
HYGIENE/GROOMING: INDEPENDENT

## 2020-01-12 NOTE — PLAN OF CARE
Problem: Depressive Symptoms  Goal: Depressive Symptoms  Description  Signs and symptoms of listed problems will be absent or manageable.  Outcome: Improving  Flowsheets  Taken 1/11/2020 1442  Depressive Symptoms Assessed: all  Taken 1/12/2020 1405  Depressive Symptoms Present: affect;sleep  Note:   Pt presents with a flat affect and calm, sad mood. Pt thought process is impaired, insight is fair. Pt spends most of the shift in her room bed resting or napping, spent little time in the lounge just to eat meals. Pt attended community meeting with prompts from staff. Pt remains socially withdrawn and flat. Pt denies Si.

## 2020-01-12 NOTE — PLAN OF CARE
Pt mood is depressed, affect is flat - blunt. Thought process is impaired. Insight is poor. Pt denied suicidal ideation this evening, was med and food compliant, did not attend groups. Spent the entire shift sleeping and would only get up to eat and use the phone.

## 2020-01-13 ENCOUNTER — ANESTHESIA (OUTPATIENT)
Dept: SURGERY | Facility: CLINIC | Age: 35
End: 2020-01-13

## 2020-01-13 PROCEDURE — G0177 OPPS/PHP; TRAIN & EDUC SERV: HCPCS

## 2020-01-13 PROCEDURE — 25000128 H RX IP 250 OP 636: Performed by: NURSE ANESTHETIST, CERTIFIED REGISTERED

## 2020-01-13 PROCEDURE — 12400000 ZZH R&B MH

## 2020-01-13 PROCEDURE — 25000132 ZZH RX MED GY IP 250 OP 250 PS 637: Performed by: PSYCHIATRY & NEUROLOGY

## 2020-01-13 PROCEDURE — 99232 SBSQ HOSP IP/OBS MODERATE 35: CPT | Mod: 25 | Performed by: PSYCHIATRY & NEUROLOGY

## 2020-01-13 PROCEDURE — 25800030 ZZH RX IP 258 OP 636: Performed by: ANESTHESIOLOGY

## 2020-01-13 PROCEDURE — 25000125 ZZHC RX 250: Performed by: NURSE ANESTHETIST, CERTIFIED REGISTERED

## 2020-01-13 PROCEDURE — 90870 ELECTROCONVULSIVE THERAPY: CPT

## 2020-01-13 PROCEDURE — 25000128 H RX IP 250 OP 636: Performed by: PSYCHIATRY & NEUROLOGY

## 2020-01-13 RX ORDER — KETOROLAC TROMETHAMINE 30 MG/ML
30 INJECTION, SOLUTION INTRAMUSCULAR; INTRAVENOUS
Status: DISCONTINUED | OUTPATIENT
Start: 2020-01-13 | End: 2020-01-16 | Stop reason: HOSPADM

## 2020-01-13 RX ORDER — SODIUM CHLORIDE, SODIUM LACTATE, POTASSIUM CHLORIDE, CALCIUM CHLORIDE 600; 310; 30; 20 MG/100ML; MG/100ML; MG/100ML; MG/100ML
500 INJECTION, SOLUTION INTRAVENOUS CONTINUOUS
Status: DISCONTINUED | OUTPATIENT
Start: 2020-01-13 | End: 2020-01-16 | Stop reason: HOSPADM

## 2020-01-13 RX ADMIN — SUCCINYLCHOLINE CHLORIDE 100 MG: 20 INJECTION, SOLUTION INTRAMUSCULAR; INTRAVENOUS; PARENTERAL at 06:44

## 2020-01-13 RX ADMIN — MIRTAZAPINE 45 MG: 45 TABLET, FILM COATED ORAL at 20:41

## 2020-01-13 RX ADMIN — KETOROLAC TROMETHAMINE 30 MG: 30 INJECTION, SOLUTION INTRAMUSCULAR at 06:15

## 2020-01-13 RX ADMIN — VENLAFAXINE HYDROCHLORIDE 150 MG: 150 CAPSULE, EXTENDED RELEASE ORAL at 08:48

## 2020-01-13 RX ADMIN — SODIUM CHLORIDE, POTASSIUM CHLORIDE, SODIUM LACTATE AND CALCIUM CHLORIDE 500 ML: 600; 310; 30; 20 INJECTION, SOLUTION INTRAVENOUS at 06:13

## 2020-01-13 RX ADMIN — LAMOTRIGINE 200 MG: 100 TABLET ORAL at 08:48

## 2020-01-13 RX ADMIN — BREXPIPRAZOLE 3 MG: 2 TABLET ORAL at 08:47

## 2020-01-13 RX ADMIN — METHOHEXITAL SODIUM 100 MG: 500 INJECTION, POWDER, LYOPHILIZED, FOR SOLUTION INTRAMUSCULAR; INTRAVENOUS; RECTAL at 06:44

## 2020-01-13 ASSESSMENT — ACTIVITIES OF DAILY LIVING (ADL)
HYGIENE/GROOMING: INDEPENDENT
ORAL_HYGIENE: INDEPENDENT
DRESS: INDEPENDENT
LAUNDRY: WITH SUPERVISION

## 2020-01-13 ASSESSMENT — LIFESTYLE VARIABLES: TOBACCO_USE: 0

## 2020-01-13 NOTE — ANESTHESIA POSTPROCEDURE EVALUATION
Patient: Misty Parham    * No procedures listed *    Diagnosis:* No pre-op diagnosis entered *  Diagnosis Additional Information: No value filed.    Anesthesia Type:  General    Note:  Anesthesia Post Evaluation    Patient location during evaluation: Bedside  Patient participation: Able to fully participate in evaluation  Level of consciousness: awake and alert  Pain management: adequate  Airway patency: patent  Cardiovascular status: acceptable  Respiratory status: acceptable  Hydration status: acceptable  PONV: none             Last vitals:  Vitals:    01/13/20 0715 01/13/20 0720 01/13/20 0725   BP: 111/68 112/75 115/69   Pulse: 79 83 83   Resp: 14 14 22   Temp:      SpO2: 95% 94% 95%         Electronically Signed By: Pedro Finley MD  January 13, 2020  7:47 AM

## 2020-01-13 NOTE — PLAN OF CARE
BEHAVIORAL TEAM DISCUSSION    Participants: MD, CM, OT, RN  Progress: mood and concentration improving  Anticipated length of stay: 4 days   Continued Stay Criteria/Rationale: Continue ECT, medication adjustment  Medical/Physical: NA  Precautions:   Behavioral Orders   Procedures     Code 1 - Restrict to Unit     Code 2 - 1:1 Staff Supervision     For ECT only     Discontinue 1:1 attendant for suicide risk     Order Specific Question:   I have performed an in person assessment of the patient     Answer:   Based on this assessment the patient no longer requires a one on one attendant at this point in time.     Electroconvulsive therapy     Series of up to 12 treatments. Begin Date: 1/3/20     Treating Psychiatrist providing ECT:  Dr Reynaldo Kwon     Notified on:  today     Electroconvulsive therapy     Series of up to 12 treatments. Begin Date: 1/3/20     Treating Psychiatrist providing ECT:  Dr Kwon     Notified on:  Today  Patient under shayy truong, paperwork on chart     Fall precautions     Shayy Gordon     Routine Programming     As clinically indicated     Status 15     Every 15 minutes.     Plan: Continue ECT, medication adjustment, consult family about rides for outpatient ECT  Rationale for change in precautions or plan: NA

## 2020-01-13 NOTE — PLAN OF CARE
Shift Update: Pt mood is calm - depressed, affect is flat. Thought process is impaired. Insight is good. Pt denied suicidal ideation this evening, was med med and food compliant, did not attend groups. Pt spent time reading in her bed and reported that she feels slightly better today. Pt further explained that she feels more hopeful and motivated because she misses her kids

## 2020-01-13 NOTE — ANESTHESIA CARE TRANSFER NOTE
Patient: Misty Parham    * No procedures listed *    Diagnosis: * No pre-op diagnosis entered *  Diagnosis Additional Information: No value filed.    Anesthesia Type:   General     Note:  Airway :Nasal Cannula  Patient transferred to:PACU  Comments: Native airway general anesthetic.  Patient hyperventilated with 100% oxygen via mask prior to treatment.   Anesthesia induced using patent peripheral IV.    Bite block placed between molars to protect teeth and tongue.     After induction of seizure patient mask ventilated with 100% oxygen until spontaneous respirations returned.     At time of handoff to PACU, patient exhibited spontaneous respirations, adequate tidal volumes, airway patent. Oxygen via nasal cannula at 2 liters per minute to PACU in cart with siderails up, connected to wall O2 in PACU. All monitors and alarms on and functioning. Report given to PACU RN and questions answered. Handoff Report: Identifed the Patient, Identified the Reponsible Provider, Reviewed the pertinent medical history, Discussed the surgical course, Reviewed Intra-OP anesthesia mangement and issues during anesthesia, Set expectations for post-procedure period and Allowed opportunity for questions and acknowledgement of understanding      Vitals: (Last set prior to Anesthesia Care Transfer)    CRNA VITALS  1/13/2020 0623 - 1/13/2020 0655      1/13/2020             Pulse:  72    SpO2:  96 %    Resp Rate (observed):  9    Resp Rate (set):  10                Electronically Signed By: WILLIAN Lovett CRNA  January 13, 2020  6:55 AM

## 2020-01-13 NOTE — PROCEDURES
[facility] ECT Procedure Note    I have reviewed the Findings of the Fact, Conclusion of Law and Order Authorizing Electroconvulsive Therapy signed by the court on December 27, 2019.  Under this order the head of the hospital is authorized to administer up to three (3) treatments per week for a period of two (2) weeks followed by up to two (2) treatments per week for the duration of the commitment expiring on June 18, 2020 unless there is subsequent order by the court pertaining to this matter      Misty Mckeon Davidne [id number]   34 year old 1985     Patient Status: Inpatient    No Known Allergies    Weight:  225 lbs 4.8 oz              Diagnosis:   Major depression       Indications for ECT:   History of good ECT response in one or more previous episodes of illness       Pause for the Cause:     Right patient Yes   Right procedure/laterality settings: Yes   Right diagnosis Yes          Intra-Procedure Documentation:     Date:  1/13/2020  Time:  6:44 AM    ECT #    Treatment number this series: 5   Total treatment number: 5   Type of ECT:  Bilateral, standard    ECT Medications administered: Brevital: 100mg  Succinyl Choline: 100mg         Clinical Narrative:     ECT was administered by Thymatron machine.  Pt has been medically cleared for procedure, consent signed. Side effects, Risks and benefits reviewed.    ECT Strip Summary:   Energy Level: 70 percent  Motor Seizure Duration: 38 seconds  EEG Seizure Duration: 41 seconds    Complications: Yes Travis Sr and Lexie    Plan: 6th ECT 1/13/20  Outpatient Psychiatrist:Drew Juarez NP

## 2020-01-13 NOTE — ANESTHESIA PREPROCEDURE EVALUATION
"Anesthesia Pre-Procedure Evaluation    Patient: Misty Parham   MRN: [id number] : 1985          Preoperative Diagnosis: * No pre-op diagnosis entered *    * No procedures listed *    Past Medical History:   Diagnosis Date     Depressive disorder      Past Surgical History:   Procedure Laterality Date     CHOLECYSTECTOMY         Anesthesia Evaluation     . Pt has had prior anesthetic. Type: General    No history of anesthetic complications          ROS/MED HX    ENT/Pulmonary:      (-) tobacco use and sleep apnea   Neurologic:      (-) Delerium   Cardiovascular:     (+) ----. : . . . :. . Previous cardiac testing date:results:date: results:ECG reviewed date:19 results:NSR date: results:          METS/Exercise Tolerance:  >4 METS   Hematologic:         Musculoskeletal:         GI/Hepatic:        (-) GERD   Renal/Genitourinary:         Endo:     (+) Obesity (BMI 37), .      Psychiatric:     (+) psychiatric history (severe, requring ECT) depression      Infectious Disease:         Malignancy:         Other:    (+) No chance of pregnancy                         Physical Exam      Airway   Mallampati: III  TM distance: >3 FB  Neck ROM: full    Dental   Comment: Patient notes several \"cracked\" molars, denies loose teeth    Cardiovascular   Rhythm and rate: regular      Pulmonary    breath sounds clear to auscultation            Lab Results   Component Value Date    WBC 6.6 2019    HGB 13.0 2019    HCT 39.9 2019     2019     2019    POTASSIUM 3.8 2019    CHLORIDE 111 (H) 2019    CO2 28 2019    BUN 13 2019    CR 0.84 2019    GLC 90 2019    CRISTINE 8.8 2019    ALBUMIN 3.7 2019    PROTTOTAL 7.8 2019    ALT 21 2019    AST 20 2019    ALKPHOS 76 2019    BILITOTAL 0.4 2019    TSH 1.61 2019    HCG Negative 2019       Preop Vitals  BP Readings from Last 3 Encounters:   20 126/79 " "  10/23/19 121/84   09/20/19 107/68    Pulse Readings from Last 3 Encounters:   01/12/20 67   10/23/19 85   09/20/19 80      Resp Readings from Last 3 Encounters:   01/13/20 16   10/23/19 16   09/20/19 16    SpO2 Readings from Last 3 Encounters:   01/13/20 96%   10/23/19 98%   09/20/19 96%      Temp Readings from Last 1 Encounters:   01/13/20 36.9  C (98.5  F) (Oral)    Ht Readings from Last 1 Encounters:   10/07/19 1.651 m (5' 5\")      Wt Readings from Last 1 Encounters:   01/07/20 102.2 kg (225 lb 4.8 oz)    Estimated body mass index is 37.49 kg/m  as calculated from the following:    Height as of 10/7/19: 1.651 m (5' 5\").    Weight as of this encounter: 102.2 kg (225 lb 4.8 oz).       Anesthesia Plan      History & Physical Review  History and physical reviewed and following examination; no interval change.    ASA Status:  2 .    NPO Status:  > 8 hours    Plan for General (Mask ventilation) with Intravenous induction.          Postoperative Care      Consents  Anesthetic plan, risks, benefits and alternatives discussed with:  Patient..                 Pedro Finley MD  "

## 2020-01-13 NOTE — PROGRESS NOTES
Left a voice mail for patient's mother, Violeta [phone] to call me, so I can inquire if she or another family member could transport patient for outpatient ECT once or twice a week, probably Friday and stay with her for 6 hours after.      I spoke with Violeta.  She stated she would not be able to transport and stay with patient for outpatient ECT.  She stated she works full time and can not take time off work.  She stated patient has two daughters that are nurses and they work different schedules and although she can't speak for them she doubts they could do it either.  I asked if it would be possible for the family members to take turns with a once a week treatment.  Violeta stated no.

## 2020-01-13 NOTE — PLAN OF CARE
Problem: Depressive Symptoms  Goal: Depressive Symptoms  Description  Signs and symptoms of listed problems will be absent or manageable.  Outcome: Improving  Flowsheets  Taken 1/11/2020 1442 by Ivana Serrano  Depressive Symptoms Assessed: all  Taken 1/13/2020 1429 by Robert Shaver  Depressive Symptoms Present: affect;mood;thought process;insight  Note:   Pt presents with a blunt affect and depressed mood. Pt spent the majority od the shift resting in bed post ECT, but did attend unit programming and participate appropriately. Pt declined having a headache or nausea, but did endorse some memory fogginess from the 5th ECT. Pt stated that she felt much less depressed today but is still missing her kids. Pt ate all of her food and was med compliant. Pt denied Si.

## 2020-01-13 NOTE — PROGRESS NOTES
[facility]  Psychiatric Progress Note    Length of stay (days): 49        Interim History:   The patient's care was discussed with the treatment team during the daily team meeting and/or staff's chart notes were reviewed.  Staff report: No acute issues overnight.    Depression severity scale 0-10 (10=most severe):  Today: 5    She reports that her mood has been improving over the weekend.  She attributes this to medication changes and ECT.  She seems slightly more hopeful today to maintain these improvements.  No side effects to Effexor reported.  Suicidal thoughts are beginning to diminish and she is beginning to look forward to returning home.  She reports missing her children and is looking forward to seeing them soon.    No psychotic symptoms reported.  No homicidal ideation reported.    No side effects related to ECT reported.    Energy is better, appetite is normalizing, concentration is better, she is beginning to read books and is enjoying what she is reading, sleeping well at night.         Medications:       brexpiprazole  3 mg Oral Daily     ketorolac  30 mg Intravenous Q Mon Wed Fri AM     ketorolac  30 mg Intravenous Q Mon Wed Fri AM     lamoTRIgine  200 mg Oral Daily     mirtazapine  45 mg Oral At Bedtime     sodium chloride (PF)  3 mL Intracatheter Q8H     venlafaxine  150 mg Oral Daily with breakfast          Allergies:   No Known Allergies       Labs:   No results found for this or any previous visit (from the past 24 hour(s)).       Psychiatric Examination:     /86   Pulse 83   Temp 97.9  F (36.6  C) (Oral)   Resp 16   Wt 102.2 kg (225 lb 4.8 oz)   SpO2 96%   BMI 37.49 kg/m    Weight is 225 lbs 4.8 oz  Body mass index is 37.49 kg/m .  Orthostatic Vitals     None            Appearance: awake, alert  Attitude:  cooperative  Eye Contact:  fair  Mood:  depressed  Affect:  intensity is blunted  Speech:  decreased prosody  Psychomotor Behavior:  no evidence of tardive  dyskinesia, dystonia, or tics  Throught Process:  linear  Associations:  no loose associations  Thought Content:  no evidence of psychotic thought and passive suicidal ideation present  Insight:  fair  Judgement:  fair  Oriented to:  time, person, and place  Attention Span and Concentration:  limited  Recent and Remote Memory:  limited    Clinical Global Impressions  First:  Considering your total clinical experience with this particular patient population, how severe are the patient's symptoms at this time?: 7 (11/26/19 1053)  Compared to the patient's condition at the START of treatment, this patient's condition is:: 7 (11/26/19 1053)  Most recent:  Considering your total clinical experience with this particular patient population, how severe are the patient's symptoms at this time?: 7 (12/31/19 0824)  Compared to the patient's condition at the START of treatment, this patient's condition is:: 7 (12/31/19 0824)         Precautions:     Behavioral Orders   Procedures     Code 1 - Restrict to Unit     Code 2 - 1:1 Staff Supervision     For ECT only     Discontinue 1:1 attendant for suicide risk     Order Specific Question:   I have performed an in person assessment of the patient     Answer:   Based on this assessment the patient no longer requires a one on one attendant at this point in time.     Electroconvulsive therapy     Series of up to 12 treatments. Begin Date: 1/3/20     Treating Psychiatrist providing ECT:  Dr Reynaldo Kwon     Notified on:  today     Electroconvulsive therapy     Series of up to 12 treatments. Begin Date: 1/3/20     Treating Psychiatrist providing ECT:  Dr Kwon     Notified on:  Today  Patient under shayy truong, paperwork on chart     Fall precautions     Shayy Gordon     Routine Programming     As clinically indicated     Status 15     Every 15 minutes.          DIagnoses:     Major depressive disorder-recurrent, severe  Generalized anxiety disorder  Unspecified personality  disorder         Plan:     Continue ECT to address severe depressive symptoms and acute suicide risk.  Tolerating adequately.  No complications noted during the procedure.  Monitoring response.  As per the [facility] order, after 6 treatments, frequency will be reduced to twice a week for maintenance treatment.  Treatment #6 will be on Wednesday.  We will contact her mother to help determine if maintenance ECT can be conducted from home (assistance with transportation and observation afterwards).    Effexor was increased this morning to 150 mg daily as we target mood and anxiety symptoms.    Psychosocial treatments to be addressed with social work consult and groups.  Referral placed for intensive outpatient programming through [facility].  We will contact    Legal Status: Involuntary commitment for treatment of mental illness with [facility].    Disposition: Begin planning for discharge by the end of the week if her mood continues to improve.

## 2020-01-14 PROCEDURE — 25000132 ZZH RX MED GY IP 250 OP 250 PS 637: Performed by: PSYCHIATRY & NEUROLOGY

## 2020-01-14 PROCEDURE — 12400000 ZZH R&B MH

## 2020-01-14 RX ADMIN — VENLAFAXINE HYDROCHLORIDE 150 MG: 150 CAPSULE, EXTENDED RELEASE ORAL at 09:03

## 2020-01-14 RX ADMIN — BREXPIPRAZOLE 3 MG: 2 TABLET ORAL at 09:03

## 2020-01-14 RX ADMIN — LAMOTRIGINE 200 MG: 100 TABLET ORAL at 09:03

## 2020-01-14 RX ADMIN — MIRTAZAPINE 45 MG: 45 TABLET, FILM COATED ORAL at 20:56

## 2020-01-14 ASSESSMENT — ACTIVITIES OF DAILY LIVING (ADL)
LAUNDRY: WITH SUPERVISION
ORAL_HYGIENE: INDEPENDENT
DRESS: SCRUBS (BEHAVIORAL HEALTH)
HYGIENE/GROOMING: INDEPENDENT

## 2020-01-14 NOTE — PLAN OF CARE
Problem: Depressive Symptoms  Goal: Depressive Symptoms  Description  Signs and symptoms of listed problems will be absent or manageable.  Outcome: No Change  Flowsheets (Taken 1/14/2020 1467)  Depressive Symptoms Assessed: all  Depressive Symptoms Present: affect; mood; insight; other (see comment)  Note:   Pt presents with a flat affect and depressed mood. Her judgement remains impaired, and pt showed some improvements in insight today. Pt attended daytime groups with minimal participation, and brightened slightly during conversations. Pt met with her [facility]  to discuss care plans. She denies current SI and is med-compliant.

## 2020-01-14 NOTE — PLAN OF CARE
Pt slept/rested in bed all of shift except meals. Isolative. Flat/blunted affect. Calm and depressed mood. States she is depressed because she misses her kids but denies SI. Did not attend groups. Med and food compliant.

## 2020-01-15 ENCOUNTER — ANESTHESIA (OUTPATIENT)
Dept: SURGERY | Facility: CLINIC | Age: 35
End: 2020-01-15

## 2020-01-15 PROCEDURE — 25000125 ZZHC RX 250: Performed by: NURSE ANESTHETIST, CERTIFIED REGISTERED

## 2020-01-15 PROCEDURE — 25000132 ZZH RX MED GY IP 250 OP 250 PS 637: Performed by: PSYCHIATRY & NEUROLOGY

## 2020-01-15 PROCEDURE — G0177 OPPS/PHP; TRAIN & EDUC SERV: HCPCS

## 2020-01-15 PROCEDURE — 12400000 ZZH R&B MH

## 2020-01-15 PROCEDURE — 90870 ELECTROCONVULSIVE THERAPY: CPT

## 2020-01-15 PROCEDURE — 99232 SBSQ HOSP IP/OBS MODERATE 35: CPT | Mod: 25 | Performed by: PSYCHIATRY & NEUROLOGY

## 2020-01-15 PROCEDURE — 25000128 H RX IP 250 OP 636: Performed by: NURSE ANESTHETIST, CERTIFIED REGISTERED

## 2020-01-15 RX ORDER — KETOROLAC TROMETHAMINE 30 MG/ML
30 INJECTION, SOLUTION INTRAMUSCULAR; INTRAVENOUS
Status: DISCONTINUED | OUTPATIENT
Start: 2020-01-15 | End: 2020-01-15

## 2020-01-15 RX ORDER — KETOROLAC TROMETHAMINE 30 MG/ML
30 INJECTION, SOLUTION INTRAMUSCULAR; INTRAVENOUS
Status: CANCELLED
Start: 2020-01-15

## 2020-01-15 RX ADMIN — BREXPIPRAZOLE 3 MG: 2 TABLET ORAL at 08:17

## 2020-01-15 RX ADMIN — METHOHEXITAL SODIUM 100 MG: 500 INJECTION, POWDER, LYOPHILIZED, FOR SOLUTION INTRAMUSCULAR; INTRAVENOUS; RECTAL at 07:26

## 2020-01-15 RX ADMIN — SUCCINYLCHOLINE CHLORIDE 100 MG: 20 INJECTION, SOLUTION INTRAMUSCULAR; INTRAVENOUS; PARENTERAL at 07:27

## 2020-01-15 RX ADMIN — VENLAFAXINE HYDROCHLORIDE 150 MG: 150 CAPSULE, EXTENDED RELEASE ORAL at 08:17

## 2020-01-15 RX ADMIN — LAMOTRIGINE 200 MG: 100 TABLET ORAL at 08:18

## 2020-01-15 RX ADMIN — MIRTAZAPINE 45 MG: 45 TABLET, FILM COATED ORAL at 20:31

## 2020-01-15 ASSESSMENT — LIFESTYLE VARIABLES: TOBACCO_USE: 0

## 2020-01-15 NOTE — PLAN OF CARE
Pt spent the majority of the shift in her room, came out only for dinner. She reports feeling better but states that she believes in two weeks she will return to feeling depressed and hopeless as this is what has happened in the past. Pt reported that she does not have a person who could drive her and stay with her for her to get maintenance ECT. Pt appears brighter in affect, was observed smiling when speaking with staff.

## 2020-01-15 NOTE — PLAN OF CARE
Pt attended 1 of 2 OT groups today. Pt transitioned to OT group with MIN encouragement and engaged in therapeutic activity addressing functional cognition and interpersonal skills with MIN-MOD A. Pt was quiet but attentive throughout therapeutic group activity and discussion addressing medication compliance including identifying why may decide to stop taking their medications strategies to manage these thoughts/consequences of same. Pt will continue to benefit from OT intervention to address implementation of positive functional coping skills, role performance, and community reintegration.

## 2020-01-15 NOTE — ANESTHESIA PREPROCEDURE EVALUATION
"Anesthesia Pre-Procedure Evaluation    Patient: Misty Parham   MRN: [id number] : 1985          Preoperative Diagnosis: * No pre-op diagnosis entered *    * No procedures listed *    Past Medical History:   Diagnosis Date     Depressive disorder      Past Surgical History:   Procedure Laterality Date     CHOLECYSTECTOMY         Anesthesia Evaluation     . Pt has had prior anesthetic. Type: General    No history of anesthetic complications          ROS/MED HX    ENT/Pulmonary:      (-) tobacco use and sleep apnea   Neurologic:      (-) Delerium   Cardiovascular:     (+) ----. : . . . :. . Previous cardiac testing date:results:date: results:ECG reviewed date:19 results:NSR date: results:          METS/Exercise Tolerance:  >4 METS   Hematologic:         Musculoskeletal:         GI/Hepatic:        (-) GERD   Renal/Genitourinary:         Endo:     (+) Obesity (BMI 37), .      Psychiatric:     (+) psychiatric history (severe, requring ECT) depression      Infectious Disease:         Malignancy:         Other:    (+) No chance of pregnancy                           Physical Exam      Airway   Mallampati: III  TM distance: >3 FB  Neck ROM: full    Dental   Comment: Patient notes several \"cracked\" molars, denies loose teeth    Cardiovascular   Rhythm and rate: regular      Pulmonary    breath sounds clear to auscultation            Lab Results   Component Value Date    WBC 6.6 2019    HGB 13.0 2019    HCT 39.9 2019     2019     2019    POTASSIUM 3.8 2019    CHLORIDE 111 (H) 2019    CO2 28 2019    BUN 13 2019    CR 0.84 2019    GLC 90 2019    CRISTINE 8.8 2019    ALBUMIN 3.7 2019    PROTTOTAL 7.8 2019    ALT 21 2019    AST 20 2019    ALKPHOS 76 2019    BILITOTAL 0.4 2019    TSH 1.61 2019    HCG Negative 2019       Preop Vitals  BP Readings from Last 3 Encounters:   01/15/20 125/79 " "  10/23/19 121/84   09/20/19 107/68    Pulse Readings from Last 3 Encounters:   01/15/20 78   10/23/19 85   09/20/19 80      Resp Readings from Last 3 Encounters:   01/15/20 16   10/23/19 16   09/20/19 16    SpO2 Readings from Last 3 Encounters:   01/15/20 95%   10/23/19 98%   09/20/19 96%      Temp Readings from Last 1 Encounters:   01/15/20 35.9  C (96.7  F) (Temporal)    Ht Readings from Last 1 Encounters:   10/07/19 1.651 m (5' 5\")      Wt Readings from Last 1 Encounters:   01/14/20 103.2 kg (227 lb 9.6 oz)    Estimated body mass index is 37.87 kg/m  as calculated from the following:    Height as of 10/7/19: 1.651 m (5' 5\").    Weight as of 1/14/20: 103.2 kg (227 lb 9.6 oz).       Anesthesia Plan      History & Physical Review  History and physical reviewed and following examination; no interval change.    ASA Status:  2 .    NPO Status:  > 8 hours    Plan for General (Mask ventilation) with Intravenous induction.          Postoperative Care      Consents  Anesthetic plan, risks, benefits and alternatives discussed with:  Patient..                   Luiza Lima MD  "

## 2020-01-15 NOTE — ANESTHESIA POSTPROCEDURE EVALUATION
Patient: Misty Parham    * No procedures listed *    Diagnosis:* No pre-op diagnosis entered *  Diagnosis Additional Information: No value filed.    Anesthesia Type:  General    Note:  Anesthesia Post Evaluation    Patient location during evaluation: PACU  Patient participation: Able to fully participate in evaluation  Level of consciousness: awake and alert  Pain management: adequate  Airway patency: patent  Cardiovascular status: acceptable  Respiratory status: acceptable and unassisted  Hydration status: acceptable  PONV: none             Last vitals:  Vitals:    01/15/20 0546 01/15/20 0558 01/15/20 0737   BP: 123/81 125/79    Pulse: 86 78    Resp: 16 16    Temp: 36.5  C (97.7  F) 35.9  C (96.7  F) 35.9  C (96.7  F)   SpO2: 96% 95% 93%         Electronically Signed By: Luiza Lima MD  January 15, 2020  7:39 AM

## 2020-01-15 NOTE — PLAN OF CARE
Problem: Adult Behavioral Health Plan of Care  Goal: Patient-Specific Goal (Individualization)  Description  1. Absence of suicidal ideation with safety plan in place.  2. Effective medication regime with patient compliance.  3. Stabilization of mood and thought processes.  4. Improved insight into mental health issues.  5. Able to identify and utilize positive coping skills.  6. Plan in place for ongoing treatment and support.     Outcome: Improving  Note:   Pt had her 6th and last ECT tx . Pt does feel better denies any SI still remains depressed but improved. Pt acknowledges that the depression is still there but is more manageable. Staff acknowledged that the pt appears brighter and more animated and encouraged pt to continue with treatment. Pt was concerned about the commitment process and if she need to continue with maintenance. Since pt is not able to meet requirements for OP ECT this tx option is no longer available. Pt did have some memory issues this morning was not able to remember that she will be attending [facility]. Pt does think this will be helpful for her. Pt is planning on discharging tomorrow and is looking forward to seeing her kids. Nursing to continue to monitor.

## 2020-01-15 NOTE — PROCEDURES
[facility] ECT Procedure Note    I have reviewed the Findings of the Fact, Conclusion of Law and Order Authorizing Electroconvulsive Therapy signed by the court on December 27, 2019.  Under this order the head of the hospital is authorized to administer up to three (3) treatments per week for a period of two (2) weeks followed by up to two (2) treatments per week for the duration of the commitment expiring on June 18, 2020 unless there is subsequent order by the court pertaining to this matter      Misty Mckeon Davidne [id number]   34 year old 1985     Patient Status: Inpatient    No Known Allergies    Weight:  227 lbs 9.6 oz              Diagnosis:   Major depression       Indications for ECT:   History of good ECT response in one or more previous episodes of illness       Pause for the Cause:     Right patient Yes   Right procedure/laterality settings: Yes   Right diagnosis Yes          Intra-Procedure Documentation:     Date:  1/15/2020  Time:  6:44 AM    ECT #    Treatment number this series: 6   Total treatment number: 6   Type of ECT:  Bilateral, standard    ECT Medications administered: Brevital: 100mg  Succinyl Choline: 100mg         Clinical Narrative:     ECT was administered by Thymatron machine.  Pt has been medically cleared for procedure, consent signed. Side effects, Risks and benefits reviewed.    ECT Strip Summary:   Energy Level: 75 percent  Motor Seizure Duration: 38 seconds  EEG Seizure Duration: 41 seconds    Complications: Yes Travis Sr and Lexie    Plan: 7th ECT 1/15/20  Outpatient Psychiatrist:Drew Juarez NP

## 2020-01-15 NOTE — PROGRESS NOTES
[facility]  Psychiatric Progress Note    Length of stay (days): 51        Interim History:   The patient's care was discussed with the treatment team during the daily team meeting and/or staff's chart notes were reviewed.  Staff report: No acute issues overnight.  Some forgetfulness noted this morning after ECT.    Depression severity scale 0-10 (10=most severe):  Today: 5    Mood remains stable and she appears to be tolerating ECT well with minimal and anticipated side effects (for example short-term memory loss on the day of the procedure).    No side effects to Effexor reported.    She denied suicidal thoughts today.  She continues to attend groups and reports gaining benefit.  She misses her childrenand is looking forward to seeing them this weekend.      She continues to report ongoing psychosocial stressors involving marital discord and having no other option at the moment but to live with her mother.  She tells me that her ideal situation would be to live together with her  again as an intact family unit with their children.  They have been  for several years now.  He is currently out of the country and she has not had contact with him recently.    No psychotic symptoms reported.  No homicidal ideation reported.    Energy is better, appetite is normalizing, concentration is better, she is beginning to read books and is enjoying what she is reading, sleeping well at night.         Medications:       brexpiprazole  3 mg Oral Daily     ketorolac  30 mg Intravenous Q Mon Wed Fri AM     ketorolac  30 mg Intravenous Q Mon Wed Fri AM     lamoTRIgine  200 mg Oral Daily     mirtazapine  45 mg Oral At Bedtime     sodium chloride (PF)  3 mL Intracatheter Q8H     venlafaxine  150 mg Oral Daily with breakfast          Allergies:   No Known Allergies       Labs:   No results found for this or any previous visit (from the past 24 hour(s)).       Psychiatric Examination:     BP (!) 136/94    Pulse 78   Temp 98.2  F (36.8  C) (Oral)   Resp 15   Wt 103.2 kg (227 lb 9.6 oz)   SpO2 93%   BMI 37.87 kg/m    Weight is 227 lbs 9.6 oz  Body mass index is 37.87 kg/m .  Orthostatic Vitals     None            Appearance: awake, alert  Attitude:  cooperative  Eye Contact:  fair  Mood:  better  Affect:  intensity is blunted  Speech:  decreased prosody  Psychomotor Behavior:  no evidence of tardive dyskinesia, dystonia, or tics  Throught Process:  linear  Associations:  no loose associations  Thought Content:  no evidence of suicidal ideation or homicidal ideation and no evidence of psychotic thought  Insight:  fair  Judgement:  fair  Oriented to:  time, person, and place  Attention Span and Concentration:  fair  Recent and Remote Memory:  limited    Clinical Global Impressions  First:  Considering your total clinical experience with this particular patient population, how severe are the patient's symptoms at this time?: 7 (11/26/19 1053)  Compared to the patient's condition at the START of treatment, this patient's condition is:: 7 (11/26/19 1053)  Most recent:  Considering your total clinical experience with this particular patient population, how severe are the patient's symptoms at this time?: 7 (12/31/19 0824)  Compared to the patient's condition at the START of treatment, this patient's condition is:: 7 (12/31/19 0824)         Precautions:     Behavioral Orders   Procedures     Code 1 - Restrict to Unit     Code 2 - 1:1 Staff Supervision     For ECT only     Discontinue 1:1 attendant for suicide risk     Order Specific Question:   I have performed an in person assessment of the patient     Answer:   Based on this assessment the patient no longer requires a one on one attendant at this point in time.     Electroconvulsive therapy     Series of up to 12 treatments. Begin Date: 1/3/20     Treating Psychiatrist providing ECT:  Dr Reynaldo Kwon     Notified on:  today     Electroconvulsive therapy     Series of up  to 12 treatments. Begin Date: 1/3/20     Treating Psychiatrist providing ECT:  Dr Kwon     Notified on:  Today  Patient under shayy truong, paperwork on chart     Fall precautions     Shayy Gordon     Routine Programming     As clinically indicated     Status 15     Every 15 minutes.          DIagnoses:     Major depressive disorder-recurrent, severe  Generalized anxiety disorder  Unspecified personality disorder         Plan:     Today will conclude her acute series of 6 ECT sessions as allowed by the Shayy Andersen order.  Moving forward, she is allowed to receive 2 treatments a week as maintenance treatment for the remainder of the commitment.  Noting that she is now ready to transition to outpatient care, she unfortunately will not be able to continue maintenance ECT outside of the hospital environment due to transportation and observation limitations.    Increase Effexor as planned tomorrow morning to 225 mg daily to achieve our target dose to address her mood and anxiety disorder.  [employer] testing has also been ordered for tomorrow morning to ensure appropriate antidepressant utilization noting several past medication trials.    Psychosocial treatments to be addressed with social work consult and groups.  Referral placed for intensive outpatient programming through [facility].  Consider pursuing neuropsychological testing on an outpatient basis to further explore if there are any personality characteristics/features that could be complicating her treatment course.    Legal Status: Involuntary commitment for treatment of mental illness with Shayy Andersen.    Disposition: Anticipate discharge home tomorrow.

## 2020-01-16 VITALS
RESPIRATION RATE: 15 BRPM | WEIGHT: 227.6 LBS | BODY MASS INDEX: 37.87 KG/M2 | OXYGEN SATURATION: 97 % | HEART RATE: 77 BPM | SYSTOLIC BLOOD PRESSURE: 117 MMHG | DIASTOLIC BLOOD PRESSURE: 83 MMHG | TEMPERATURE: 98.4 F

## 2020-01-16 LAB — MISCELLANEOUS TEST: NORMAL

## 2020-01-16 PROCEDURE — 84999 UNLISTED CHEMISTRY PROCEDURE: CPT | Performed by: PSYCHIATRY & NEUROLOGY

## 2020-01-16 PROCEDURE — 25000132 ZZH RX MED GY IP 250 OP 250 PS 637: Performed by: PSYCHIATRY & NEUROLOGY

## 2020-01-16 PROCEDURE — 99239 HOSP IP/OBS DSCHRG MGMT >30: CPT | Performed by: PSYCHIATRY & NEUROLOGY

## 2020-01-16 PROCEDURE — 0029U RX METAB ADVRS TRGT SEQ ALYS: CPT | Performed by: PSYCHIATRY & NEUROLOGY

## 2020-01-16 PROCEDURE — 36415 COLL VENOUS BLD VENIPUNCTURE: CPT | Performed by: PSYCHIATRY & NEUROLOGY

## 2020-01-16 RX ORDER — VENLAFAXINE HYDROCHLORIDE 75 MG/1
225 CAPSULE, EXTENDED RELEASE ORAL
Qty: 90 CAPSULE | Refills: 0 | Status: ON HOLD | OUTPATIENT
Start: 2020-01-17 | End: 2020-04-23

## 2020-01-16 RX ORDER — HYDROXYZINE HYDROCHLORIDE 25 MG/1
25-50 TABLET, FILM COATED ORAL 3 TIMES DAILY PRN
Qty: 30 TABLET | Refills: 0 | Status: ON HOLD | OUTPATIENT
Start: 2020-01-16 | End: 2020-04-23

## 2020-01-16 RX ORDER — LAMOTRIGINE 200 MG/1
200 TABLET ORAL DAILY
Qty: 30 TABLET | Refills: 0 | Status: SHIPPED | OUTPATIENT
Start: 2020-01-17 | End: 2020-02-17

## 2020-01-16 RX ORDER — MIRTAZAPINE 45 MG/1
45 TABLET, FILM COATED ORAL AT BEDTIME
Qty: 30 TABLET | Refills: 0 | Status: ON HOLD | OUTPATIENT
Start: 2020-01-16 | End: 2020-04-23

## 2020-01-16 RX ADMIN — LAMOTRIGINE 200 MG: 100 TABLET ORAL at 09:02

## 2020-01-16 RX ADMIN — BREXPIPRAZOLE 3 MG: 2 TABLET ORAL at 09:02

## 2020-01-16 RX ADMIN — VENLAFAXINE HYDROCHLORIDE 225 MG: 150 CAPSULE, EXTENDED RELEASE ORAL at 09:02

## 2020-01-16 ASSESSMENT — ACTIVITIES OF DAILY LIVING (ADL)
HYGIENE/GROOMING: INDEPENDENT
DRESS: STREET CLOTHES;INDEPENDENT
LAUNDRY: WITH SUPERVISION
ORAL_HYGIENE: INDEPENDENT

## 2020-01-16 NOTE — PROGRESS NOTES
Pt is being Provisionally Discharged home today.  (see AVS for follow up)  PD Agreement was faxed to court, to pt's Co CM, copy given to pt, and copy filed in chart for scanning.

## 2020-01-16 NOTE — PLAN OF CARE
Pt was isolative and withdrawn, she spent all shift in her room bed resting, starring up at the ceiling. Pt had a blunt affect but brightened upon approach. Pt was only up to eat dinner in the lounge, she did not attend any groups.

## 2020-01-16 NOTE — PLAN OF CARE
Problem: Adult Behavioral Health Plan of Care  Goal: Patient-Specific Goal (Individualization)  Description  1. Absence of suicidal ideation with safety plan in place.  2. Effective medication regime with patient compliance.  3. Stabilization of mood and thought processes.  4. Improved insight into mental health issues.  5. Able to identify and utilize positive coping skills.  6. Plan in place for ongoing treatment and support.     Outcome: Adequate for Discharge  Note:   Pt has been present this morning ans appears brighter and more spontaneous in  her reactions to staff. Pt is anxious about discharge but does feel ready to leave. Staff emphasized the importance of utilizing the IOP program to help transition herself form the hospital and getting some support from them. Pt also encouraged to think about doing TMS at [facility] as this may be beneficial for her if she is unable to follow the post anesthesia protocol for ECT. Pt is interested and said she would discuss with her provider those at [facility]. Pt denies SI. Pt does understand that she can not drive for the next week as per post ECT. Discharge instructions went over with patient and step father.   Discharge instructions reviewed with patient including follow-up care plan. Educated on medication regime including review of name, dose, route, frequency, side effects, and next dose needed.  Advised not to stop prescribed medication without consulting their physician.  Receptive to instructions and teachings.  Copy of AVS given to patient. Coping skills and support network reviewed.  Denied SI. All belongings which where brought into the hospital have been returned to patient. Escorted off the unit/ Picked up by step father. Pt D/C'd at 1430 .

## 2020-01-16 NOTE — DISCHARGE SUMMARY
[facility], [place]  Department of Psychiatry    DATE OF ADMISSION:  11/25/2019    DATE OF DISCHARGE:  January 16, 2020    DISCHARGE DIAGNOSES:   Major depressive disorder-recurrent, severe  Generalized anxiety disorder  Unspecified personality disorder    HOSPITAL COURSE: (Refer to H&P, progress notes, and consult notes for details)    The patient was admitted to our Behavioral Health Unit under a 72-hour hold for depressed mood and suicidal ideation.  The majority of her treatment course was conducted by Dr. Madrigal.  Records indicate that the patient was not initially willing to cooperate with her care plan as she voiced severe depressed mood and hopelessness.  A petition for commitment was pursued along with a Travis Andersen in hopes of gaining clearance to initiate ECT.  A petition for commitment was later supported along with a court order supporting Travis Andersen and treatment with ECT.  She became more cooperative with her medications and resumed Rexulti, Trintellix, and Remeron for antidepressant treatment.  Shortly after initiating ECT, the patient's care was transferred to me.  The patient verbalized concern that despite gaining improvement with ECT, her mood will again decompensate if she is continued on the same medications which she interprets did not previously work well for her.  We discussed transitioning off of Trintellix to initiate Effexor for antidepressant treatment.  Effexor was initiated and tapered up to 225 mg with good response and tolerability.  Trintellix was tapered off without negative consequences on her mood.  As she completed 6 treatments of ECT, her mood significantly improved.  She began to attend groups, hygiene improved, and her affect appeared brighter.  She reported sleeping well and eating adequately.  She appeared more future oriented and optimistic regarding maintaining the improvements she had gained.  We began coordinating plans for  discharge.  She met with our  to optimize her outpatient care plan.  A referral was placed for day treatment through [facility].  We explored the possibility of continuing maintenance ECT on an outpatient basis however the patient did not have any supports in place that could monitor her after the procedure.  She was educated regarding TMS and encouraged to explore that treatment mode through [facility] if she noticed relapse of her depressive symptoms after discharge from the hospital.  Noting completion of her treatment goals, the patient was provisionally discharged home, and transitioned to outpatient care.    Other interventions received during his hospitalization included:   Psychosocial treatments were addressed with groups, social work consult, and supportive milieu provided by staff.    CONDITION AT DISCHARGE:  Improved.  The patients acute suicide risk is low due to the following factors:  improved mood/anxiety symptoms.  Denies suicidal ideations. Denies psychotic symptoms.  Not actively intoxicated and plans to abstain from illicit substances and alcohol.  Denies access to guns.  Denies feeling hopeless or helpless. At the time of discharge Misty Parham was determined to not be an immediate danger to herself or others. The patient's acute risk will be higher if noncompliant with treatment plan, medications, follow-up or using illicit substances or alcohol.  These findings along with the risks of noncompliance with medications and treatment plan, which could potentially cause decompensation and increase the risk for suicide, were discussed with the patient.  The patients chronic suicide risk is moderate given the following factors: past suicide attempts; white race; diagnosis of MDD, Denied a family history of suicide.  Preventative factors include: social supports, stable housing, children     MENTAL STATUS EXAMINATION AT TIME OF DISCHARGE:  The patient is  34 year old White female who appears their stated age and is appropriately dressed with good hygiene.  Calm and cooperative with the interview questions.  No psychomotor abnormalities are noted. Eye contact is appropriate. Speech has normal rate, tone, latency and volume and is not pushed or pressured. Mood is euthymic and affect is full and appropriate.  The patient does not seem overtly depressed, anxious, manic or irritable.  Thought process is linear, logical and future oriented.  Thought process is not tangential, circumstantial or disorganized.  Thought content is not significant for apperant paranoia, delusions, ideas of reference or grandiosity.  The patient denies suicidal and homicidal ideations as well as auditory and visual hallucinations.  Insight and judgment are fair.  Cognition appears intact to interviewing including orientation, recent and remote memory, fund of knowledge, use of language, attention span and concentration.  Muscle strength, tone and gait appear normal on visual inspection.      DISPOSITION:  The patient is discharged home.  She remains under court ordered commitment for treatment of mental illness with a Robles Andersen order, now under a provisional discharge status.    FOLLOWUP APPOINTMENTS:  ( per social workers notes and after visit summary)  [facility]  [address]  [id number]  [facility]   [id number]   Ph: [phone]     FX:   [phone]  Follow with Drew Juarez NP when you have completed IOP at [facility] [address]  [phone]  Fax [phone]  Intake appointment for Intensive Outpatient Program with Mireya Corona on Tuesday, January 21 at 10:00 AM and with FAN Warner on Tuesday, January 21 at 11:00 AM,     Your [facility]  is Samreen Machuca at [phone].    DISCHARGE MEDICATIONS:   Current Discharge Medication List      START taking these  medications    Details   lamoTRIgine (LAMICTAL) 200 MG tablet Take 1 tablet (200 mg) by mouth daily  Qty: 30 tablet, Refills: 0    Associated Diagnoses: Severe episode of recurrent major depressive disorder, without psychotic features (H)      venlafaxine (EFFEXOR-XR) 75 MG 24 hr capsule Take 3 capsules (225 mg) by mouth daily (with breakfast)  Qty: 90 capsule, Refills: 0    Associated Diagnoses: Severe episode of recurrent major depressive disorder, without psychotic features (H)         CONTINUE these medications which have CHANGED    Details   brexpiprazole (REXULTI) 3 MG tablet Take 1 tablet (3 mg) by mouth daily  Qty: 30 tablet, Refills: 0    Associated Diagnoses: Severe episode of recurrent major depressive disorder, without psychotic features (H)      hydrOXYzine (ATARAX) 25 MG tablet Take 1-2 tablets (25-50 mg) by mouth 3 times daily as needed for anxiety  Qty: 30 tablet, Refills: 0    Associated Diagnoses: Severe episode of recurrent major depressive disorder, without psychotic features (H)      mirtazapine (REMERON) 45 MG tablet Take 1 tablet (45 mg) by mouth At Bedtime  Qty: 30 tablet, Refills: 0    Associated Diagnoses: Severe episode of recurrent major depressive disorder, without psychotic features (H)         STOP taking these medications       vortioxetine (TRINTELLIX/BRINTELLIX) 20 MG tablet Comments:   Reason for Stopping:                LABORATORY RESULTS: (past 14 days)  No results found for this or any previous visit (from the past 336 hour(s)).    >30 minutes was spent on this discharge to allow for reviewing the patient's response to treatment, reviewing plan of care, education on medications and diagnosis, and conducting a risk assessment.

## 2020-01-16 NOTE — DISCHARGE INSTRUCTIONS
Behavioral Discharge Planning and Instructions      Summary:  You were admitted to [facility] under the care of Dr Madrigal and Dr Laughlin.     Your outpatient psychiatric provider recommended you be hospitalized due to your having suicidal ideation.  During much of your admission, you stated you were not feeling safe and still having suicidal ideation.    Therefore, a petition for commitment was pursued which resulted in an MI commitment to [facility] and the Commissioner of Human Services, as of 12-18-19.    There is also a Durant order (which includes Rexulti, Vraylar, Latuda, and Abilify.)  There was also a Robles Sr order issued by [facility] on 12-27-19.    While in the hospital, you had a course of ECT with the last treatment being done on 1-15-20.   Do NOT drive for 7 days from your last treatment.    While in the hospital, your symptoms improved.   You are being Provisionally discharged home today.    Please  Rexult medication at [facility] in [place]  Address: [address] [place] [address]  Phone: [phone]          Main Diagnosis: Major depression, recurrent, severe, without psychotic features.  Generalized Anxiety Disorder  Traits of Borderline Personality Disorder      Mental Health Follow-Up:      [facility]  [address]  [address]  [place]   [address]   Ph: [phone]     FX:   [phone]  Follow with Drew Juarez NP when you have completed IOP at [facility] [address]  [phone]  Fax [phone]  Intake appointment for Intensive Outpatient Program with Mireya Corona on Tuesday, January 21 at 10:00 AM and with FAN Warner on Tuesday, January 21 at 11:00 AM,  Please arrive a half hour early for paperwork  --------------------------------------------------------    Your [facility]  is Samreen Machuca at [phone]   [phone].    ---------------------------------------------------------               Your insurance/[employer] Phone  is Kavon at [phone].  She will be calling you at home    Attend all scheduled appointments with your outpatient providers. Call at least 24 hours in advance if you need to reschedule an appointment to ensure continued access to your outpatient providers.   Major Treatments, Procedures and Findings:  You were provided with: a psychiatric assessment, assessed for medical stability, medication evaluation and/or management and group therapy    Symptoms to Report: feeling more aggressive, increased confusion, losing more sleep, mood getting worse or thoughts of suicide    Early warning signs can include: increased depression or anxiety sleep disturbances increased thoughts or behaviors of suicide or self-harm  increased unusual thinking, such as paranoia or hearing voices    Safety and Wellness:  Take all medicines as directed.  Make no changes unless your doctor suggests them.      Follow treatment recommendations.  Refrain from alcohol and non-prescribed drugs.  If there is a concern for safety, call 911.    Resources:   Crisis Intervention: 939.220.1547 or 346-930-6253 (TTY: 882.315.9881).  Call anytime for help.  National [place] on Mental Illness (www.[place].davian.org): 895.665.6138 or 442-774-9632.  Suicide Awareness Voices of Education (SAVE) (www.save.org): 213-961-AYHD (7694)  National Suicide Prevention Line (www.mentalhealthmn.org): 431-128-PJOI (9676)  Mental Health Consumer/Survivor Network of [place] (www.mhcsn.net): 181.318.4018 or 962-579-2456  [facility] Crisis (COPE) Response - Adult [phone]    The treatment team has appreciated the opportunity to work with you. If you have any questions or concerns our unit number is [phone]

## 2020-01-21 LAB
RESULT: NORMAL
SEND OUTS MISC TEST CODE: NORMAL
SEND OUTS MISC TEST SPECIMEN: NORMAL
TEST NAME: NORMAL

## 2020-02-17 ENCOUNTER — TELEPHONE (OUTPATIENT)
Dept: BEHAVIORAL HEALTH | Facility: CLINIC | Age: 35
End: 2020-02-17

## 2020-02-17 ENCOUNTER — HOSPITAL ENCOUNTER (EMERGENCY)
Facility: CLINIC | Age: 35
Discharge: PSYCHIATRIC OR SUBSTANCE USE FACILITY | End: 2020-02-18
Attending: EMERGENCY MEDICINE | Admitting: EMERGENCY MEDICINE
Payer: COMMERCIAL

## 2020-02-17 DIAGNOSIS — T50.912A SUICIDE ATTEMPT BY MULTIPLE DRUG OVERDOSE, INITIAL ENCOUNTER (H): ICD-10-CM

## 2020-02-17 LAB
ALBUMIN SERPL-MCNC: 3.8 G/DL (ref 3.4–5)
ALP SERPL-CCNC: 78 U/L (ref 40–150)
ALT SERPL W P-5'-P-CCNC: 15 U/L (ref 0–50)
ANION GAP SERPL CALCULATED.3IONS-SCNC: 7 MMOL/L (ref 3–14)
APAP SERPL-MCNC: <2 MG/L (ref 10–20)
AST SERPL W P-5'-P-CCNC: 13 U/L (ref 0–45)
BASOPHILS # BLD AUTO: 0 10E9/L (ref 0–0.2)
BASOPHILS NFR BLD AUTO: 0.4 %
BILIRUB SERPL-MCNC: 0.4 MG/DL (ref 0.2–1.3)
BUN SERPL-MCNC: 9 MG/DL (ref 7–30)
CALCIUM SERPL-MCNC: 9.1 MG/DL (ref 8.5–10.1)
CHLORIDE SERPL-SCNC: 107 MMOL/L (ref 94–109)
CO2 SERPL-SCNC: 25 MMOL/L (ref 20–32)
CREAT SERPL-MCNC: 0.86 MG/DL (ref 0.52–1.04)
DIFFERENTIAL METHOD BLD: NORMAL
EOSINOPHIL # BLD AUTO: 0.1 10E9/L (ref 0–0.7)
EOSINOPHIL NFR BLD AUTO: 0.9 %
ERYTHROCYTE [DISTWIDTH] IN BLOOD BY AUTOMATED COUNT: 14.4 % (ref 10–15)
GFR SERPL CREATININE-BSD FRML MDRD: 87 ML/MIN/{1.73_M2}
GLUCOSE SERPL-MCNC: 86 MG/DL (ref 70–99)
HCG SERPL QL: NEGATIVE
HCT VFR BLD AUTO: 39.1 % (ref 35–47)
HGB BLD-MCNC: 12.7 G/DL (ref 11.7–15.7)
IMM GRANULOCYTES # BLD: 0 10E9/L (ref 0–0.4)
IMM GRANULOCYTES NFR BLD: 0.3 %
LYMPHOCYTES # BLD AUTO: 2.2 10E9/L (ref 0.8–5.3)
LYMPHOCYTES NFR BLD AUTO: 23.7 %
MCH RBC QN AUTO: 29.5 PG (ref 26.5–33)
MCHC RBC AUTO-ENTMCNC: 32.5 G/DL (ref 31.5–36.5)
MCV RBC AUTO: 91 FL (ref 78–100)
MONOCYTES # BLD AUTO: 0.7 10E9/L (ref 0–1.3)
MONOCYTES NFR BLD AUTO: 7.2 %
NEUTROPHILS # BLD AUTO: 6.2 10E9/L (ref 1.6–8.3)
NEUTROPHILS NFR BLD AUTO: 67.5 %
NRBC # BLD AUTO: 0 10*3/UL
NRBC BLD AUTO-RTO: 0 /100
PLATELET # BLD AUTO: 314 10E9/L (ref 150–450)
POTASSIUM SERPL-SCNC: 3.8 MMOL/L (ref 3.4–5.3)
PROT SERPL-MCNC: 7.6 G/DL (ref 6.8–8.8)
RBC # BLD AUTO: 4.31 10E12/L (ref 3.8–5.2)
SODIUM SERPL-SCNC: 139 MMOL/L (ref 133–144)
WBC # BLD AUTO: 9.2 10E9/L (ref 4–11)

## 2020-02-17 PROCEDURE — 99285 EMERGENCY DEPT VISIT HI MDM: CPT | Mod: 25

## 2020-02-17 PROCEDURE — 80329 ANALGESICS NON-OPIOID 1 OR 2: CPT | Performed by: EMERGENCY MEDICINE

## 2020-02-17 PROCEDURE — 84703 CHORIONIC GONADOTROPIN ASSAY: CPT | Performed by: EMERGENCY MEDICINE

## 2020-02-17 PROCEDURE — 90791 PSYCH DIAGNOSTIC EVALUATION: CPT

## 2020-02-17 PROCEDURE — 85025 COMPLETE CBC W/AUTO DIFF WBC: CPT | Performed by: EMERGENCY MEDICINE

## 2020-02-17 PROCEDURE — 80053 COMPREHEN METABOLIC PANEL: CPT | Performed by: EMERGENCY MEDICINE

## 2020-02-17 RX ORDER — LAMOTRIGINE 150 MG/1
300 TABLET ORAL DAILY
Status: ON HOLD | COMMUNITY
End: 2020-04-23

## 2020-02-17 RX ORDER — HYDROXYZINE HYDROCHLORIDE 25 MG/1
25 TABLET, FILM COATED ORAL EVERY 4 HOURS PRN
Status: DISCONTINUED | OUTPATIENT
Start: 2020-02-17 | End: 2020-02-18 | Stop reason: HOSPADM

## 2020-02-17 RX ORDER — LAMOTRIGINE 100 MG/1
200 TABLET ORAL EVERY MORNING
Status: DISCONTINUED | OUTPATIENT
Start: 2020-02-18 | End: 2020-02-18 | Stop reason: HOSPADM

## 2020-02-17 RX ORDER — VENLAFAXINE HYDROCHLORIDE 75 MG/1
225 CAPSULE, EXTENDED RELEASE ORAL
Status: DISCONTINUED | OUTPATIENT
Start: 2020-02-18 | End: 2020-02-18 | Stop reason: HOSPADM

## 2020-02-17 ASSESSMENT — ENCOUNTER SYMPTOMS
SHORTNESS OF BREATH: 0
CHILLS: 0
FEVER: 0

## 2020-02-17 ASSESSMENT — MIFFLIN-ST. JEOR: SCORE: 1720.55

## 2020-02-17 NOTE — ED NOTES
Bed: ED17  Expected date:   Expected time:   Means of arrival:   Comments:  414  34F SUICIDAL   ADITI TOWNSEND  Eta [id number]

## 2020-02-17 NOTE — TELEPHONE ENCOUNTER
S: Ana Lilia calling w clinical on 34/F SI w plan     B: Dx MDD, recurrent, severe   pt was at psychiatrist office, SI, not taking meds, psychiatrist put her on a hold, she eloped and went home, police found her at home and brought her to ED  SI w plan, poisoning herself slowly so her kidneys shut down, doesn't want people to know she killed herself, rather that she was sick  Eating, drinking, ambulating independently   Patient cleared and ready for behavioral bed placement: Yes    A: Hold   Pt is committed as well    R: 30/Tata     No bed available at [facility] this evening  731pm - on call paged   733pm - [facility] accepts   Pt placed in queue   736pm - unit busy, charge to call intake back   822pm - charge passing meds, intake awaiting call back   830pm - unit notified   834pm - ED notified, labs and utox were collected, hold will be uploaded into the chart when time allows   Disposition information not shared with ED at this time, awaiting hold paperwork to be available for the unit. Labs back   Intake received the hold  Hold faxed to the unit   1045pm - charge unavailable, intake awaiting call back   1051pm - charge called, expected admit on next shift   1052pm - ED notified

## 2020-02-17 NOTE — ED TRIAGE NOTES
Pt. Was at Psych office and MD put a 72 hour hold on pt due to suicidal ideation and the pt. Fled the clinic to home. [place] PD went to pt house and pt was there. EMS then brought pt to us for further evaluation and suicidal intent.

## 2020-02-18 ENCOUNTER — HOSPITAL ENCOUNTER (INPATIENT)
Facility: CLINIC | Age: 35
LOS: 3 days | Discharge: ANOTHER HOSPITAL | End: 2020-02-21
Attending: PSYCHIATRY & NEUROLOGY | Admitting: PSYCHIATRY & NEUROLOGY
Payer: COMMERCIAL

## 2020-02-18 VITALS
OXYGEN SATURATION: 99 % | HEART RATE: 95 BPM | DIASTOLIC BLOOD PRESSURE: 83 MMHG | RESPIRATION RATE: 16 BRPM | HEIGHT: 65 IN | WEIGHT: 227 LBS | BODY MASS INDEX: 37.82 KG/M2 | SYSTOLIC BLOOD PRESSURE: 114 MMHG

## 2020-02-18 PROCEDURE — 12400001 ZZH R&B MH UMMC

## 2020-02-18 PROCEDURE — 93010 ELECTROCARDIOGRAM REPORT: CPT | Performed by: INTERNAL MEDICINE

## 2020-02-18 PROCEDURE — 99207 ZZC CDG-MDM COMPONENT: MEETS MODERATE - UP CODED: CPT | Performed by: PSYCHIATRY & NEUROLOGY

## 2020-02-18 PROCEDURE — 93005 ELECTROCARDIOGRAM TRACING: CPT

## 2020-02-18 PROCEDURE — 99223 1ST HOSP IP/OBS HIGH 75: CPT | Mod: AI | Performed by: PSYCHIATRY & NEUROLOGY

## 2020-02-18 PROCEDURE — 25000132 ZZH RX MED GY IP 250 OP 250 PS 637: Performed by: NURSE PRACTITIONER

## 2020-02-18 RX ORDER — TRAZODONE HYDROCHLORIDE 50 MG/1
50 TABLET, FILM COATED ORAL
Status: DISCONTINUED | OUTPATIENT
Start: 2020-02-18 | End: 2020-02-21 | Stop reason: HOSPADM

## 2020-02-18 RX ORDER — ALUMINA, MAGNESIA, AND SIMETHICONE 2400; 2400; 240 MG/30ML; MG/30ML; MG/30ML
30 SUSPENSION ORAL EVERY 4 HOURS PRN
Status: DISCONTINUED | OUTPATIENT
Start: 2020-02-18 | End: 2020-02-21 | Stop reason: HOSPADM

## 2020-02-18 RX ORDER — HYDROXYZINE HYDROCHLORIDE 25 MG/1
25-50 TABLET, FILM COATED ORAL 3 TIMES DAILY PRN
Status: DISCONTINUED | OUTPATIENT
Start: 2020-02-18 | End: 2020-02-21 | Stop reason: HOSPADM

## 2020-02-18 RX ORDER — OLANZAPINE 5 MG/1
5-10 TABLET ORAL
Status: DISCONTINUED | OUTPATIENT
Start: 2020-02-18 | End: 2020-02-21 | Stop reason: HOSPADM

## 2020-02-18 RX ORDER — OLANZAPINE 10 MG/2ML
5-10 INJECTION, POWDER, FOR SOLUTION INTRAMUSCULAR
Status: DISCONTINUED | OUTPATIENT
Start: 2020-02-18 | End: 2020-02-21 | Stop reason: HOSPADM

## 2020-02-18 RX ORDER — VENLAFAXINE HYDROCHLORIDE 225 MG/1
225 TABLET, EXTENDED RELEASE ORAL
Status: DISCONTINUED | OUTPATIENT
Start: 2020-02-18 | End: 2020-02-21 | Stop reason: HOSPADM

## 2020-02-18 RX ORDER — BISACODYL 10 MG
10 SUPPOSITORY, RECTAL RECTAL DAILY PRN
Status: DISCONTINUED | OUTPATIENT
Start: 2020-02-18 | End: 2020-02-21 | Stop reason: HOSPADM

## 2020-02-18 RX ORDER — ACETAMINOPHEN 325 MG/1
650 TABLET ORAL EVERY 4 HOURS PRN
Status: DISCONTINUED | OUTPATIENT
Start: 2020-02-18 | End: 2020-02-21 | Stop reason: HOSPADM

## 2020-02-18 RX ORDER — MIRTAZAPINE 45 MG/1
45 TABLET, FILM COATED ORAL AT BEDTIME
Status: DISCONTINUED | OUTPATIENT
Start: 2020-02-18 | End: 2020-02-21 | Stop reason: HOSPADM

## 2020-02-18 RX ORDER — LAMOTRIGINE 150 MG/1
300 TABLET ORAL DAILY
Status: DISCONTINUED | OUTPATIENT
Start: 2020-02-18 | End: 2020-02-21 | Stop reason: HOSPADM

## 2020-02-18 RX ADMIN — ACETAMINOPHEN 650 MG: 325 TABLET, FILM COATED ORAL at 03:15

## 2020-02-18 RX ADMIN — MIRTAZAPINE 45 MG: 45 TABLET, FILM COATED ORAL at 03:15

## 2020-02-18 ASSESSMENT — ACTIVITIES OF DAILY LIVING (ADL)
ORAL_HYGIENE: INDEPENDENT
SWALLOWING: 0-->SWALLOWS FOODS/LIQUIDS WITHOUT DIFFICULTY
RETIRED_EATING: 0-->INDEPENDENT
TOILETING: 0-->INDEPENDENT
DRESS: INDEPENDENT
AMBULATION: 0-->INDEPENDENT
DRESS: 0-->INDEPENDENT
HYGIENE/GROOMING: INDEPENDENT
COGNITION: 0 - NO COGNITION ISSUES REPORTED
LAUNDRY: WITH SUPERVISION
ORAL_HYGIENE: INDEPENDENT
HYGIENE/GROOMING: INDEPENDENT
RETIRED_COMMUNICATION: 0-->UNDERSTANDS/COMMUNICATES WITHOUT DIFFICULTY
DRESS: INDEPENDENT
TRANSFERRING: 0-->INDEPENDENT
BATHING: 0-->INDEPENDENT
FALL_HISTORY_WITHIN_LAST_SIX_MONTHS: NO
LAUNDRY: WITH SUPERVISION

## 2020-02-18 ASSESSMENT — MIFFLIN-ST. JEOR: SCORE: 1743.23

## 2020-02-18 NOTE — ED PROVIDER NOTES
"[facility] ED Behavioral Health Handoff Note:       Brief HPI:  This is a 34 year old female signed out to me by Dr. Burr .  See initial ED Provider note for details of the presentation.     Patient is medically cleared for admission to a Behavioral Health unit.      Pending studies include none.      The patient is on a hold.  The type of hold is 72hr hold.      Exam:   Patient Vitals for the past 24 hrs:   BP Pulse Heart Rate Resp SpO2 Height Weight   02/18/20 0031 114/83 95 95 16 99 % -- --   02/17/20 2024 -- -- -- -- 95 % -- --   02/17/20 2023 118/80 78 -- -- -- -- --   02/17/20 1645 114/80 -- 90 16 97 % 1.651 m (5' 5\") 103 kg (227 lb)       ED Course:    There were no significant events while under my care.      Awaiting transfer to [facility] by EMS.     Impression:    ICD-10-CM    1. Suicide attempt by multiple drug overdose, initial encounter T50.912A        Plan:    1. Transferred to [facility] for admission by EMS      RESULTS:   Results for orders placed or performed during the hospital encounter of 02/17/20 (from the past 24 hour(s))   CBC with platelets + differential     Status: None    Collection Time: 02/17/20  8:22 PM   Result Value Ref Range    WBC 9.2 4.0 - 11.0 10e9/L    RBC Count 4.31 3.8 - 5.2 10e12/L    Hemoglobin 12.7 11.7 - 15.7 g/dL    Hematocrit 39.1 35.0 - 47.0 %    MCV 91 78 - 100 fl    MCH 29.5 26.5 - 33.0 pg    MCHC 32.5 31.5 - 36.5 g/dL    RDW 14.4 10.0 - 15.0 %    Platelet Count 314 150 - 450 10e9/L    Diff Method Automated Method     % Neutrophils 67.5 %    % Lymphocytes 23.7 %    % Monocytes 7.2 %    % Eosinophils 0.9 %    % Basophils 0.4 %    % Immature Granulocytes 0.3 %    Nucleated RBCs 0 0 /100    Absolute Neutrophil 6.2 1.6 - 8.3 10e9/L    Absolute Lymphocytes 2.2 0.8 - 5.3 10e9/L    Absolute Monocytes 0.7 0.0 - 1.3 10e9/L    Absolute Eosinophils 0.1 0.0 - 0.7 10e9/L    Absolute Basophils 0.0 0.0 - 0.2 10e9/L    Abs Immature Granulocytes 0.0 0 - 0.4 10e9/L    Absolute Nucleated RBC " 0.0    Comprehensive metabolic panel     Status: None    Collection Time: 02/17/20  8:22 PM   Result Value Ref Range    Sodium 139 133 - 144 mmol/L    Potassium 3.8 3.4 - 5.3 mmol/L    Chloride 107 94 - 109 mmol/L    Carbon Dioxide 25 20 - 32 mmol/L    Anion Gap 7 3 - 14 mmol/L    Glucose 86 70 - 99 mg/dL    Urea Nitrogen 9 7 - 30 mg/dL    Creatinine 0.86 0.52 - 1.04 mg/dL    GFR Estimate 87 >60 mL/min/[1.73_m2]    GFR Estimate If Black >90 >60 mL/min/[1.73_m2]    Calcium 9.1 8.5 - 10.1 mg/dL    Bilirubin Total 0.4 0.2 - 1.3 mg/dL    Albumin 3.8 3.4 - 5.0 g/dL    Protein Total 7.6 6.8 - 8.8 g/dL    Alkaline Phosphatase 78 40 - 150 U/L    ALT 15 0 - 50 U/L    AST 13 0 - 45 U/L   Acetaminophen level     Status: None    Collection Time: 02/17/20  8:22 PM   Result Value Ref Range    Acetaminophen Level <2 mg/L   HCG QUALitative pregnancy (blood)     Status: None    Collection Time: 02/17/20  8:22 PM   Result Value Ref Range    HCG Qualitative Serum Negative NEG^Negative             MD Kaz Gayle Scott, MD  02/18/20 0239

## 2020-02-18 NOTE — PLAN OF CARE
Illness Management Recovery model: Personal Plan of Care    Patient completed Personal Plan of Care, identifying reasons for hospitalization and goals for discharge.   Form reviewed in team meeting and signed by patient, physician, writer.     Problem: Adult Behavioral Health Plan of Care  Goal: Patient-Specific Goal (Individualization)  2/18/2020 1129 by Wilma Murrieta LICSW  Flowsheets (Taken 2/18/2020 1129)  Patient Personal Strengths: stable living environment;family/social support;community support  Patient Vulnerabilities: Pt experiences chronic SI, Pt is currently commited with Bhargav and Travis Gordon and does not want to adhere to Tx, Pt identifies that she doesn't care anymore, Pt is  from , pt has financial concerns    2/18/2020 0432 by Deb Lagunas, RN  Outcome: No Change  Note:   Shift Summery:  New admit    Patient's Stated Goal for Shift:  Sleep    Goal Status:  In Process     Form given to HUC to be scanned into [employer].

## 2020-02-18 NOTE — PROGRESS NOTES
02/18/20 0307   Patient Belongings   Did you bring any home meds/supplements to the hospital?  No   Patient Belongings other (see comments)   Belongings Search Yes   Clothing Search Yes   Second Staff RNs TT & SE     No belongings sent to security.    EXAM ROOM LOCKER:  Hooded jacket  Shoes, clothing  [place] ID  Phone    A               Admission:  I am responsible for any personal items that are not sent to the safe or pharmacy.  [facility] is not responsible for loss, theft or damage of any property in my possession.    Signature:  _________________________________ Date: _______  Time: _____                                              Staff Signature:  ____________________________ Date: ________  Time: _____      2nd Staff person, if patient is unable/unwilling to sign:    Signature: ________________________________ Date: ________  Time: _____     Discharge:  [facility] has returned all of my personal belongings:    Signature: _________________________________ Date: ________  Time: _____                                          Staff Signature:  ____________________________ Date: ________  Time: _____

## 2020-02-18 NOTE — PLAN OF CARE
S: Misty Parham 34/F admitted to [facility] for suicidal ideation on 72 hour hold after arguing with outpatient psychiatrist about not wanting to continue ECT treatment. Per report pt has Robles Andersen for ECT. Last ECT was January 16, 2020. Pt put on hold and then eloped. Police were called and pt was brought in to ED.   Pt admits that she misuses her remeron and hydroxyzine by taking more than prescribed. Pt's SI plan is to slowly cause her kidneys or liver to shut down so her family wouldn't know it's a suicide attempt.  Current diagnoses: MDD, IVY, and unspecified personality disorder.    B: Mental health admissions 4-5, most recent in January. Pt has 3 children; 2 live with pt and her parents and 1 lives with pt's sister. Pt is recently  from .     A: Cooperative with search and interview. Disheveled, flat affect, quiet/soft voice, poor eye contact, continuously shaking legs and arms.     R: Suicide and Elopement precautions. Pt given remeron and tylenol for sleep and headache.

## 2020-02-18 NOTE — PLAN OF CARE
BEHAVIORAL TEAM DISCUSSION    Participants: Dr. Oleg Payton MD, Nayeli Garg RN, Katarzyna Francis OT, Wilma Murrieta CTC    Progress: Day 1. Pt is on a 72 hour hold that started 12:01AM 2.18 and ends 12:01 Friday. Pt is committed to [facility] and has [facility] and Robles Gordon. Pt was brought to the [facility] ED by EMS when police were called because she fled her psychiatrists office after he placed the 72 hour hold and called EMS. Pt was expressing SI because she doesn't want the ECT treatment. She is refusing meds.     Anticipated length of stay: 1-2 weeks     Continued Stay Criteria/Rationale: Pt needs to re-stabalize on medications and the use of forced ECT may be warranted. Pt is a danger to herself because of her severe SI.     Medical/Physical: None    Precautions:   Behavioral Orders   Procedures     Code 1 - Restrict to Unit     Elopement precautions     Routine Programming     As clinically indicated     Status 15     Every 15 minutes.     Suicide precautions     Patients on Suicide Precautions should have a Combination Diet ordered that includes a Diet selection(s) AND a Behavioral Tray selection for Safe Tray - with utensils, or Safe Tray - NO utensils       Plan:  Patient has been admitted to the psychiatric unit for stabilization.  Patient will be seen and assessed by psychiatric doctor.  Medication changes will be reviewed and monitored.  Groups will be offered to assist patient with increasing knowledge, strategies, and copping techniques to help manage mental health.  CTC will meet with patient to provide individualized mental health resources and support throughout patient's hospitalization.  CTC will assist with developing a Care Plan and after care appointments.    Rationale for change in precautions or plan: None

## 2020-02-18 NOTE — PROGRESS NOTES
"Initial Psychosocial Assessment    I have reviewed the chart, met with the patient, and developed Care Plan.  Information for assessment was obtained from:     Chart Review, Pt Interview, Pt's VADIM Machuca     Presenting Problem:  PT presented to the [facility] ED with SI. Pt was at her outpatient psych stated that she wished to die so she would not have to complete ECT. 72 hour hold was placed by provider and she fled before the EMS arrived.  The police were called to her home and she admitted to overdose  on medications (3 hydroxezine and 1 remeron) police were called and she was brought in for evaluation.     History of Mental Health and Chemical Dependency:  11/25/19-1/26/20- [facility]-Diagnosed with MDD, IVY and Unspecified Personality Disorder. Pt was committed 12/18/19 with Patrice Sr 12/27/19.     9/12/19-9/20/19- [facility]-SI/MDD/IVY/BPD- ECT was done voluntarily     6/20/19-7/3/19- [facility]- ECT was done voluntarily    1/3/19-1/21/19- [facility]- ECT was done voluntarily    Has completed DBT twice    Family Description (Constellation, Family Psychiatric History):  Pt has 13 year old who lives in [place] with Pt and Pt's mom,  16 year old who lives with Pt's sister in [place], 21 year old step child whereabouts are unknown. Pt identifies that family has a \"lot of alcoholics\" but unsure about Hx of MI.     Significant Life Events (Illness, Abuse, Trauma, Death):  Seperated from . Pt has concerns about losing job and losing insurance.     Living Situation:  Pt lives with her mother and her teenage child in [place].     Educational Background:  Pt graduated high school.     Occupational History:  Pt is an employee at [employer] and receives insurance from them. Pt has been on a leave of absence since October 2019 and is unsure if she still holds a position.     Financial Status:  Pt has BCBS that her  pays for. She has this through her " employer for now and is unsure about how much longer. Pt does not receive any benefits and is currently not receiving pay due to her break from work. She is unsure how much longer she can be on a leave of absence before she is terminated.     Legal Issues:  Commitment with Bhargav and Travis Gordon    Ethnic/Cultural Issues:  None    Spiritual Orientation:  None     Service History:  None    Social Functioning (organization, interests):  Coloring. Pt identifies independence with all ADL's. Socially withdrawn.     Current Treatment Providers are:  Dr. Zhang Madrigal MD  [facility]  [address]  [address] [place] [address]  P: [phone]  F: [phone]    [facility]   Samreen Brigette   [phone]     Social Service Assessment/Plan:  Patient will have psychiatric assessment and medication management by the psychiatrist. Medications will be reviewed and adjusted per MD as indicated. The treatment team will continue to assess and stabilize the patient's mental health symptoms with the use of medications and therapeutic programming. Hospital staff will provide a safe environment and a therapeutic milieu. Staff will continue to assess patient as needed. Patient will participate in unit groups and activities. Patient will receive individual and group support on the unit.     CTC will do individual inpatient treatment planning and after care planning. CTC will discuss options for increasing community supports with the patient. CTC will coordinate with outpatient providers and will place referrals to ensure appropriate follow up care is in place.

## 2020-02-18 NOTE — H&P
"Admitted:     02/18/2020      LEGAL STATUS AND REASON FOR ADMISSION:  The patient was admitted on a 72-hour hold after being brought to the Emergency Department at [facility] by police due to suicidal ideations.  The patient is currently under provisional discharge from a civil commitment was approved with Patrice Sr during the most recent hospitalization at [facility] 3 months ago.      CHIEF COMPLAINT:  \"The doctor because he wanted me to do ECT.\"      SOURCES FOR INTAKE:  The patient's interview and review of available medical records.      HISTORY OF PRESENT ILLNESS:  Misty Parham is a 34-year-old  female with history of depression, anxiety and borderline personality disorder, who was brought to the Emergency Department by EMS/police due to suicidal ideation.  The patient lives with her mother.  She works at [employer] but has worked since October.  She is currently under provisional discharge with Patrice Sr through [facility] and she was seen by Dr. Madrigal at the clinic for a followup.  During that visit, the patient endorsed active suicidal ideations.  Dr. Madrigal placed her on a 72-hour hold with recommendations for referral to the Emergency Department; however, patient eloped from the clinic.  He called the police and patient was picked up from her home and brought to the Emergency Department at [facility].  The patient tells me that she is refusing to do ECT.  She has not been compliant with care, marginally engaged.  She has been refusing medications.  She also continues to refuse ECT.  Based on records, the patient was hospitalized from 11/25/2019-01/16/2020.  During that time, she received a series of ECT and home medications were adjusted.  She was taken off Trintellix and started on Effexor.  She was continued on Rexulti, mirtazapine and Lamictal.  She was also referred to a day program at [facility] and suggestions were made to consider TMS if she did " "not want to resume maintenance ECT.  The patient failed to adhere to those recommendations, but tells me that she has been compliant with medications.  She also alleged that she attempted suicide by taking 3 tablets of hydroxyzine and an extra tablet of mirtazapine.  She tells me that she was not intending to harm her kidneys in a suicide attempt.      The patient was unable to identify specific triggers, but tells me that the depression has been worsening in the past 2 weeks.  She tells me that her main stressor is \"hating life, accomplishing nothing and going nowhere.\"  She lives with her mother.  She tells me that her  from whom she has been estranged from for about 5-6 years is actually supportive.  She endorsed anhedonia.  She has been sleeping excessively.  She tells me that her appetite has been poor and she has not eaten in days.  She also endorsed poor energy, motivation, concentration and focus.  She endorsed increase in ruminative thoughts.  She acknowledged that she continues to have suicidal ideations, but contracted for safety on the unit and did not report any intent.  She endorsed ongoing worthlessness, hopelessness and helplessness.  She expressed desire not to cooperate with care.  She tells me that she has a lot of anxiety.  Her anxiety seemed to be often situational.  She acknowledged poor coping skills, low tolerance for stress and interpersonal conflicts with others.  She reported no history or symptoms related to jey or psychosis.  She denied history or symptoms related to PTSD, OCD, and eating disorder; however, she tells me that she binges at times and described herself as an emotional eater.      PAST PSYCHIATRIC HISTORY:  The patient used to see Dr. Drew Juarez in the past, has seen Dr. Madrigal twice since the last discharge from [facility].  She tells me that she has been hospitalized multiple times, including 4 in the past 12 months.  She has been to DBT twice.  She has " been given the diagnosis of major depressive disorder, anxiety and borderline personality disorder.  She endorsed previous suicidal gestures, but refused to elaborate further.  She also has history of self-harming behavior in the past.  She was not willing to discuss past psychotropic trials, although tells me that she has been compliant with her current medications.  She had a series of ECTs about 2 months ago and believes that she has had a total of 10 treatments, last was on 01/16.  Her current medications are Rexulti, Lamictal, mirtazapine, Remeron and Effexor.      PAST MEDICAL HISTORY:  The patient denied any recent acute or chronic medical illnesses.  She has a history of cholecystectomy.  SHE DENIED MEDICAL ALLERGIES.  She denied history of head trauma and concussions.      FAMILY HISTORY:  The patient tells me that alcoholism is prevalent in the family, but denies family history of mental illness; however, record indicated that the patient's mother had depression and issues with alcohol.  Her biological father also possibly had mental illness and chemical dependency.  Her maternal aunt has depression and chemical dependency.  Her oldest son has exhibited some mental health symptoms.      SOCIAL HISTORY:  The patient grew up in [place], was raised by her mother with 3 siblings.  Denied childhood abuse and neglect.  Graduated high school on time and did not attend college.  She has been  since 2011, but has been  from her  for about 5-6 years.  She denies legal history.  She currently resides with her mother.  She works at [employer] but has not gone to work since October.  She has 1 stepdaughter and 2 sons, ages 16 and 13.  Her oldest son lives with her sister.      PHYSICAL EXAMINATION:  Please refer to the physical exam done by Tila Burr on 02/17/2020.      REVIEW OF SYSTEMS:  A 12-point review of system was obtained and negative except for HPI.      LABORATORY DATA:  CBC  "and CMP were within normal limits.  Urine pregnancy test was negative.  Acetaminophen level was less than 2.      VITAL SIGNS:  Temperature is 97.7, respiratory rate 16, heart rate 84, blood pressure 112/79.      PSYCHIATRY EXAMINATION:  A 34-year-old  female who appears her stated age, marginally cooperative, somewhat dismissive, but redirectable.  Her speech was normal pace and clarity, but soft.  Psychomotor retardation, but no tics or tremors were noted.  Gait and muscle tone within normal limits.  She was lying in bed, but seemed able to move freely.  Mood described as depressed with congruent and appropriate affect.  Her thought process was linear and goal directed.  Thought content:  She endorsed ongoing suicidal ideations, but denied active planning and contracted for safety on the unit.  She reported no current urges to self-harm or homicidal ideations.  Reported no hallucination, no perceptual disturbances, no paranoid, grandiosity or looseness of association noted.  Her sensorium was clear.  She was oriented to time, place, person and situation.  Immediate and remote memory intact.  Language and fund of knowledge adequate for age and level of training.  Concentration and focus intact.  Insight and judgment fair to good.  Insight and judgment limited but adequate for safety at the current level of care.      DIAGNOSES:   1.  Major depressive disorder, severe, with suicidal ideations.   2.  Borderline personality disorder.   3.  Anxiety, not otherwise specified.      PLAN AND RECOMMENDATIONS:  The patient was admitted to adult unit on a 72-hour hold.  She reportedly expressed active suicidal ideations while attending an appointment with her outpatient provider, Dr. Madrigal.  She also alleged that she attempted an overdose but not close to \"a lethal dose\", hoping that she will damage her kidneys in the long run.  She was committed during the most recent hospitalization at [facility] with " Bhargav and Travis Sr orders.  She is declining ECT.  She failed to adhere to post-discharge recommendations from last hospitalization.  She has been declining medications and expressed desire not to cooperate with care.        After reviewing proposed treatment plan and medication profile, the patient was informed of the followin.  Rexulti was continued at 3 mg daily.   2.  Lamictal continued at 300 mg daily.   3.  Mirtazapine continued at 45 mg daily at bedtime.   4.  Effexor continued at 225 mg.      Internal Medicine consult will be requested to address physical complaints and ECT clearance and for health maintenance management.  I spoke with Dr. Cabrera, discussing case and reviewing the case.  Requests were made to revoke the provisional discharge and reinstate Travis Sr and Bhargav.  Psychosocial assessment was obtained by our clinical  who will assist with setting up post-discharge care.  The patient will be granted discharge once establishes mood stabilization, safety in the community and remission of suicidal ideations.  May consider referral to TMS in the future.  Also recommended reenrolling into DBT and intensive individual therapy.         CRESENCIO HINOJOSA MD             D: 2020   T: 2020   MT: BAKARI      Name:     CHASITY PERKINS   MRN:      [id number]        Account:      [id number]   :      1985        Admitted:     2020                   Document: [id number]

## 2020-02-18 NOTE — ED PROVIDER NOTES
"  History     Chief Complaint:  Suicidal    HPI   Misty Parham is a 34 year old female who presents with suicidal ideation. The patient was seen and evaluated today at her psychiatrist's office, Dr. Madrigal, where in she made comments that she wished to die in order to avoid her electroshock therapy. The patient then was told she was going to bed held in order to get treatment and subsequently fled the office and went home.  She admits to overdosing on medication at home, including 3 hydroxyzine and 1 Remeron . Police were called and found the patient at home and subsequently brought her here for evaluation. The patient here states she has continued suicidal ideation as she does not want to undergo treatment. The patient is on a stay of commitment from the [facility].     Allergies:  No known drug allergies     Medications:    Rexulti  Atarax  Lamictal  Remeron  Effexor-XR     Past Medical History:    Major depression  Suicidal ideation     Past Surgical History:    Cholecystectomy     Family History:    History reviewed. No pertinent family history.      Social History:  Smoking Status: Never Smoker  Patient presents via PD and EMS.   Marital Status:  Legally       Review of Systems   Constitutional: Negative for chills and fever.   Respiratory: Negative for shortness of breath.    Cardiovascular: Negative for chest pain.   Psychiatric/Behavioral: Positive for self-injury and suicidal ideas.   All other systems reviewed and are negative.      Physical Exam   First Vitals:  BP: 114/80  Pulse: 78  Heart Rate: 90  Resp: 16  Height: 165.1 cm (5' 5\")  Weight: 103 kg (227 lb)  SpO2: 97 %    Physical Exam  Nursing note and vitals reviewed.  Constitutional:  Appears well-developed and well-nourished.   HENT:   Head:    Atraumatic.   Mouth/Throat:   Oropharynx is clear and moist. No oropharyngeal exudate.   Eyes:    Pupils are equal, round, and reactive to light.   Neck:    Normal range of motion. Neck supple. "      No tracheal deviation present. No thyromegaly present.   Cardiovascular:  Normal rate, regular rhythm, no murmur   Pulmonary/Chest: Breath sounds are clear and equal without wheezes or crackles.  Abdominal:   Soft. Bowel sounds are normal. Exhibits no distension and      no mass. There is no tenderness.      There is no rebound and no guarding.   Musculoskeletal:  Exhibits no edema.   Lymphadenopathy:  No cervical adenopathy.   Neurological:   Alert and oriented to person, place, and time.   Skin:    Skin is warm and dry. No rash noted. No pallor.       Emergency Department Course     Laboratory:  CBC: WNL (WBC 9.2, HGB 12.7, )   CMP: (Creatinine 0.86)   Acetaminophen Level: <2  HCG: Negative    Emergency Department Course:  Past medical records, nursing notes, and vitals reviewed.  1027: I performed an exam of the patient and obtained history, as documented above.      IV inserted and blood drawn.     DEC evaluated the patient and discussed the findings.    2312: The patient was discussed with patient intake who found a bed under Dr. Payton at [facility]. Patient was placed on a 72-hour and will be transferred there via EMS.    Impression & Plan      Medical Decision Making:  This patient overdosed on medication as a suicide attempt tonight. I felt she was medically cleared. The plan is for the patient to be admitted to [facility] under Dr. Valenzuela. She was placed on a 72-hour and will be transferred there via EMS for further evaluation and treatment.    Diagnosis:    ICD-10-CM    1. Suicide attempt by multiple drug overdose, initial encounter T50.912A        Robert Zamarripa  2/17/2020    EMERGENCY DEPARTMENT    IRobert am serving as a scribe at 10:27 PM on 2/17/2020 to document services personally performed by Tila Burr MD based on my observations and the provider's statements to me.       Tila Burr MD  02/18/20 0002

## 2020-02-18 NOTE — PLAN OF CARE
"The patient and/or their representative will achieve their patient-specific goals related to the plan of care.    The patient-specific goals include:       \"Reasons you are in the hospital;\" The patient identifies the following reasons for current hospitalization:     Because of my doctor     \"Goals for Discharge\" The patient identifies the following goals for discharge:  I just want to go home     Problem: Adult Behavioral Health Plan of Care  Goal: Patient-Specific Goal (Individualization)  2/18/2020 1129 by Wilma Murrieta LICSW  Flowsheets (Taken 2/18/2020 1129)  Patient Personal Strengths: stable living environment; family/social support; community support  Patient Vulnerabilities: Pt experiences chronic SI, Pt is currently commited with [facility] and [facility] Gordon and does not want to adhere to Tx, Pt identifies that she doesn't care anymore, Pt is  from , pt has financial concerns  2/18/2020 0432 by Deb Lagunas, RN  Outcome: No Change  Note:   Shift Summery:  New admit    Patient's Stated Goal for Shift:  Sleep    Goal Status:  In Process       "

## 2020-02-18 NOTE — PHARMACY-ADMISSION MEDICATION HISTORY
Pharmacy Medication History  Admission medication history interview status for the 2/17/2020  admission is complete. See EPIC admission navigator for prior to admission medications     Medication history sources: Reviewed recent fill history through [facility].  Spoke w/ patient.   Medication history source reliability: Moderate  Adherence assessment: Moderate    Medication reconciliation completed by provider prior to medication history? No    Time spent in this activity: 10 minutes      Prior to Admission medications    Medication Sig Last Dose Taking? Auth Provider   brexpiprazole (REXULTI) 3 MG tablet Take 1 tablet (3 mg) by mouth daily 2/17/2020 at am Yes Baron Laughlin MD   hydrOXYzine (ATARAX) 25 MG tablet Take 1-2 tablets (25-50 mg) by mouth 3 times daily as needed for anxiety  at prn Yes Baron Laughlin MD   lamoTRIgine (LAMICTAL) 150 MG tablet Take 300 mg by mouth daily 2/17/2020 at am Yes Unknown, Entered By History   mirtazapine (REMERON) 45 MG tablet Take 1 tablet (45 mg) by mouth At Bedtime 2/16/2020 at hs Yes Baron Laughlin MD   venlafaxine (EFFEXOR-XR) 75 MG 24 hr capsule Take 3 capsules (225 mg) by mouth daily (with breakfast) 2/17/2020 at am Yes Baron Laughlin MD Carolyn Dahlman, PharmD, BCPS

## 2020-02-18 NOTE — PROGRESS NOTES
patient spent the shift in her room. She did not want to talk, but did endorse chronic suicidal ideation when asked.       02/18/20 1242   Behavioral Health   Hallucinations denies / not responding to hallucinations   Thinking poor concentration   Orientation person: oriented;place: oriented;date: oriented;time: oriented   Memory baseline memory   Insight poor   Judgement impaired   Eye Contact at examiner   Affect blunted, flat   Mood depressed   Physical Appearance/Attire appears stated age;attire appropriate to age and situation   Hygiene well groomed   Suicidality chronic thoughts with no stated plan   1. Wish to be Dead (Recent) Yes   2. Non-Specific Active Suicidal Thoughts (Recent) Yes   Activity withdrawn;isolative   Speech clear;coherent   Medication Sensitivity no stated side effects;no observed side effects   Psychomotor / Gait balanced;steady   Psycho Education   Type of Intervention 1:1 intervention   Response participates, initiates socially appropriate   Hours 0.5   Activities of Daily Living   Hygiene/Grooming independent   Oral Hygiene independent   Dress independent   Laundry with supervision   Room Organization independent

## 2020-02-18 NOTE — PROGRESS NOTES
RADHA contacted [facility]  Samreen Machuca  [phone] to coordinate around Pt. Samreen is going to fax copy of PD to RADHA and if the PD is being revoked she will send this as well. RADHA provided the information for the 72 hour hold however neither RADHA nor Samreen knew if yesterday's holiday effects this or not (listed as 72 hour hold being placed 2/17/20 @1400).     RADHA received following e-mail:   Anne-Marie Dillon,    Attached please find the notice of intent to revoke Misty Parham s provisional discharge agreement and the court report.  Both documents have been E-filed with the court.  Please give a copy of the notice to Ms. Parham so she is aware that the revocation is being requested.  Thank you.   I will send her PD once I m at a [facility] building where the fax machine works.      ÓSCAR Wilkerson, Houlton Regional HospitalSW  [facility]  [address] [place] [address]  Mailcode: [id number]  [phone]  Desk  [phone] Right Fax  [phone] Cell  [phone] Coverage Line

## 2020-02-19 LAB
AMPHETAMINES UR QL SCN: NEGATIVE
BARBITURATES UR QL: NEGATIVE
BENZODIAZ UR QL: NEGATIVE
CANNABINOIDS UR QL SCN: NEGATIVE
CHOLEST SERPL-MCNC: 153 MG/DL
COCAINE UR QL: NEGATIVE
ETHANOL UR QL SCN: NEGATIVE
HCG UR QL: NEGATIVE
HDLC SERPL-MCNC: 44 MG/DL
INTERPRETATION ECG - MUSE: NORMAL
LDLC SERPL CALC-MCNC: 87 MG/DL
NONHDLC SERPL-MCNC: 109 MG/DL
OPIATES UR QL SCN: NEGATIVE
PCP UR QL SCN: NEGATIVE
TRIGL SERPL-MCNC: 109 MG/DL
TSH SERPL DL<=0.005 MIU/L-ACNC: 1.08 MU/L (ref 0.4–4)

## 2020-02-19 PROCEDURE — 25000132 ZZH RX MED GY IP 250 OP 250 PS 637: Performed by: PSYCHIATRY & NEUROLOGY

## 2020-02-19 PROCEDURE — 80061 LIPID PANEL: CPT | Performed by: NURSE PRACTITIONER

## 2020-02-19 PROCEDURE — 25000132 ZZH RX MED GY IP 250 OP 250 PS 637: Performed by: NURSE PRACTITIONER

## 2020-02-19 PROCEDURE — 84443 ASSAY THYROID STIM HORMONE: CPT | Performed by: NURSE PRACTITIONER

## 2020-02-19 PROCEDURE — 81025 URINE PREGNANCY TEST: CPT | Performed by: PHYSICIAN ASSISTANT

## 2020-02-19 PROCEDURE — 12400001 ZZH R&B MH UMMC

## 2020-02-19 PROCEDURE — 99232 SBSQ HOSP IP/OBS MODERATE 35: CPT | Performed by: PSYCHIATRY & NEUROLOGY

## 2020-02-19 PROCEDURE — 80307 DRUG TEST PRSMV CHEM ANLYZR: CPT | Performed by: PSYCHIATRY & NEUROLOGY

## 2020-02-19 PROCEDURE — 36415 COLL VENOUS BLD VENIPUNCTURE: CPT | Performed by: NURSE PRACTITIONER

## 2020-02-19 PROCEDURE — 80320 DRUG SCREEN QUANTALCOHOLS: CPT | Performed by: PSYCHIATRY & NEUROLOGY

## 2020-02-19 RX ADMIN — MIRTAZAPINE 45 MG: 45 TABLET, FILM COATED ORAL at 21:17

## 2020-02-19 RX ADMIN — BREXPIPRAZOLE 3 MG: 1 TABLET ORAL at 08:55

## 2020-02-19 RX ADMIN — LAMOTRIGINE 300 MG: 150 TABLET ORAL at 08:55

## 2020-02-19 RX ADMIN — VENLAFAXINE HYDROCHLORIDE 225 MG: 225 TABLET, EXTENDED RELEASE ORAL at 08:55

## 2020-02-19 ASSESSMENT — ACTIVITIES OF DAILY LIVING (ADL)
ORAL_HYGIENE: INDEPENDENT
DRESS: STREET CLOTHES
HYGIENE/GROOMING: PROMPTS
ORAL_HYGIENE: PROMPTS
HYGIENE/GROOMING: INDEPENDENT
DRESS: INDEPENDENT
LAUNDRY: WITH SUPERVISION
LAUNDRY: WITH SUPERVISION

## 2020-02-19 NOTE — PROGRESS NOTES
"[facility], [place]   Psychiatric Progress Note  Hospital Day: 1        Interim History:   The patient's care was discussed with the treatment team during the daily team meeting and/or staff's chart notes were reviewed.  Staff report patient has been in bed since admission, has not come out for meals or medications, medications brought to room this morning and she was adherent. No acute events overnight.     Upon interview, the patient was sleeping in bed but awoke to voice. She denied that she was feeling sedated and states she takes medications to help her sleep at night and during the day. She reports feeling lightheaded and dizzy and has had minimal water and food intake since admission. She does report she urinated this morning \"a little bit\". She has no interest in having water or food as she \"wants to be in pain\". She also reports having suicidal ideation with plan to overdose if not in hospital. Denies plan or intent while on unit. She does not want ECT to be pursued and she does not feel it is helpful. When asked about if medications are helpful she stated \"I don't know\". She denies having any side effects to medications. She does report feeling depressed and identifies her children as a protective factor for her. Explained team is looking into logistics of her being committed with Travis Sr to [facility] and that she would not be getting any ECT today but this is continuing to be recommended.         Medications:       brexpiprazole  3 mg Oral Daily     lamoTRIgine  300 mg Oral Daily     mirtazapine  45 mg Oral At Bedtime     venlafaxine  225 mg Oral Daily with breakfast          Allergies:   No Known Allergies       Labs:     Recent Results (from the past 48 hour(s))   CBC with platelets + differential    Collection Time: 02/17/20  8:22 PM   Result Value Ref Range    WBC 9.2 4.0 - 11.0 10e9/L    RBC Count 4.31 3.8 - 5.2 10e12/L    Hemoglobin 12.7 11.7 - 15.7 g/dL " "   Hematocrit 39.1 35.0 - 47.0 %    MCV 91 78 - 100 fl    MCH 29.5 26.5 - 33.0 pg    MCHC 32.5 31.5 - 36.5 g/dL    RDW 14.4 10.0 - 15.0 %    Platelet Count 314 150 - 450 10e9/L    Diff Method Automated Method     % Neutrophils 67.5 %    % Lymphocytes 23.7 %    % Monocytes 7.2 %    % Eosinophils 0.9 %    % Basophils 0.4 %    % Immature Granulocytes 0.3 %    Nucleated RBCs 0 0 /100    Absolute Neutrophil 6.2 1.6 - 8.3 10e9/L    Absolute Lymphocytes 2.2 0.8 - 5.3 10e9/L    Absolute Monocytes 0.7 0.0 - 1.3 10e9/L    Absolute Eosinophils 0.1 0.0 - 0.7 10e9/L    Absolute Basophils 0.0 0.0 - 0.2 10e9/L    Abs Immature Granulocytes 0.0 0 - 0.4 10e9/L    Absolute Nucleated RBC 0.0    Comprehensive metabolic panel    Collection Time: 02/17/20  8:22 PM   Result Value Ref Range    Sodium 139 133 - 144 mmol/L    Potassium 3.8 3.4 - 5.3 mmol/L    Chloride 107 94 - 109 mmol/L    Carbon Dioxide 25 20 - 32 mmol/L    Anion Gap 7 3 - 14 mmol/L    Glucose 86 70 - 99 mg/dL    Urea Nitrogen 9 7 - 30 mg/dL    Creatinine 0.86 0.52 - 1.04 mg/dL    GFR Estimate 87 >60 mL/min/[1.73_m2]    GFR Estimate If Black >90 >60 mL/min/[1.73_m2]    Calcium 9.1 8.5 - 10.1 mg/dL    Bilirubin Total 0.4 0.2 - 1.3 mg/dL    Albumin 3.8 3.4 - 5.0 g/dL    Protein Total 7.6 6.8 - 8.8 g/dL    Alkaline Phosphatase 78 40 - 150 U/L    ALT 15 0 - 50 U/L    AST 13 0 - 45 U/L   Acetaminophen level    Collection Time: 02/17/20  8:22 PM   Result Value Ref Range    Acetaminophen Level <2 mg/L   HCG QUALitative pregnancy (blood)    Collection Time: 02/17/20  8:22 PM   Result Value Ref Range    HCG Qualitative Serum Negative NEG^Negative   EKG 12-lead, complete    Collection Time: 02/18/20  8:07 PM   Result Value Ref Range    Interpretation ECG Click View Image link to view waveform and result           Psychiatric Examination:     /75   Pulse 82   Temp 98.1  F (36.7  C) (Oral)   Ht 1.651 m (5' 5\")   Wt 105.2 kg (232 lb)   SpO2 98%   BMI 38.61 kg/m    Weight is " 232 lbs 0 oz  Body mass index is 38.61 kg/m .    Orthostatic Vitals     None        Appearance: awake, alert and adequately groomed, lying in bed   Attitude:  guarded and somewhat cooperative  Eye Contact:  fair  Mood:  depressed  Affect:  mood congruent and intensity is flat  Speech:  minimal, soft volume, was clear and coherent when speaking  Language: fluent and intact in English  Psychomotor, Gait, Musculoskeletal: slowed;  no evidence of tardive dyskinesia, dystonia, or tics  Thought Process:  linear  Associations:  no loose associations  Thought Content:  no evidence of psychotic thought and active suicidal ideation present  Insight:  limited  Judgement:  limited  Oriented to:  time, person, and place  Attention Span and Concentration:  intact  Recent and Remote Memory:  intact  Fund of Knowledge:  appropriate    Clinical Global Impressions  First:  Considering your total clinical experience with this particular patient population, how severe are the patient's symptoms at this time?: 7 (02/19/20 1117)  Compared to the patient's condition at the START of treatment, this patient's condition is:: 4 (02/19/20 1117)  Most recent:  Considering your total clinical experience with this particular patient population, how severe are the patient's symptoms at this time?: 7 (02/19/20 1117)  Compared to the patient's condition at the START of treatment, this patient's condition is:: 4 (02/19/20 1117)           Precautions:     Behavioral Orders   Procedures     Code 1 - Restrict to Unit     Electroconvulsive therapy     Once Risk management has given the go ahead and once she is medically cleared, begin ECT.  That is not likely to happen on 2/19/20 but at a future date.  This is just a heads-up that this is in the works.      ECT every Monday, Wednesday, and Friday     Elopement precautions     Routine Programming     As clinically indicated     Status 15     Every 15 minutes.     Suicide precautions     Patients on  "Suicide Precautions should have a Combination Diet ordered that includes a Diet selection(s) AND a Behavioral Tray selection for Safe Tray - with utensils, or Safe Tray - NO utensils            Diagnoses:   1.  Major depressive disorder, severe, with suicidal ideations.   2.  Borderline personality disorder.   3.  Anxiety, not otherwise specified.          Assessment & Plan:   Assessment and hospital summary:  35 yo female with above diagnosis admitted to adult unit on a 72-hour hold.  She reportedly expressed active suicidal ideations while attending an appointment with her outpatient provider, Dr. Madrigal.  She also alleged that she attempted an overdose but not close to \"a lethal dose\", hoping that she will damage her kidneys in the long run.  She was committed during the most recent hospitalization at [facility] with [place] and Travis Sr orders.  She is declining ECT.  She failed to adhere to post-discharge recommendations from last hospitalization.  She has been declining medications and expressed desire not to cooperate with care.  She has not eaten or been drinking anything since yesterday per patient and staff report aside from minimal water with taking medications this morning. Discussed case with Medical Director Dr. Hargrove, paper work has been sent to [facility] to add [facility] to Travis Sr but working to urgently transfer patient back to [facility] in order to get ECT given patients decompensation.     Psychiatric treatment/inteventions:  Medications:   -Continue PTA brexpiprazole 3 mg daily  -Continue PTA lamotrigine 300 mg daily  -Continue PTA mirtazapine 45 mg at bedtime  -Continue PTA venlafaxine 225 mg daily     Laboratory/Imaging:  Utox ordered as not completed PTA, no other new labs per psychiatry, reviewed admission labs:  2/17/2020: CMP WNL; urine hCG negative; CBC WNL; acetaminophen level <2;   2/19/2020: lipid panel with HDL 44 (L) otherwise WNL; TSH " 1.08     Patient will be treated in therapeutic milieu with appropriate individual and group therapies as described.     Medical treatment/interventions:  Medical concerns:   1) Medical clearance for ECT  -IM consult placed by Dr. Cabrera, appreciate assistance    2) Patient with limited oral intake including fluids  -IM consult placed as above to assist in continued need for monitoring and other interventions      This note was created by undersigned using a Dragon dictation system. All typing errors or contextual distortion are unintentional and software inherent.     Disposition Plan   Reason for ongoing admission: poses an imminent risk to self  Discharge location: TBD  Discharge Medications: not ordered  Follow-up Appointments: not scheduled  Legal Status: 72 HH; does have commitment with Travis Sr at [facility], PD has been revoked, working to transfer patient back to [facility] in order to undergo ECT.   Entered by: Shari Mcfadden on 2/19/2020 at 7:30 AM

## 2020-02-19 NOTE — CONSULTS
PSYCHIATRY ECT 2ND OPINION CONSULTATION    DATE OF CONSULTATION:  02/18/2020      REQUESTING SOURCE:  Dr. Oleg Payton      REASON FOR CONSULTATION:  Please evaluate second opinion for ECT.      IDENTIFICATION:  Ms. Parham is a 34-year-old,   woman living in [place] with her mother, stepfather, 22-year-old stepdaughter and 13-year-old son.  She also has a 16-year-old son, who lives with Ms. Parham's sister.  She has a history of depression, anxiety and borderline personality disorder.  She is followed by Dr. Giuseppe Madrigal.  She is currently committed with a Robles Sr in place.  She is seen by Dr. Cabrera at the request of Dr. Payton to evaluate second opinion for ECT      HISTORY OF PRESENT ILLNESS:  Ms. Parham was staffed by Dr. Cabrera on 2/18/2020.  She reports a history of depression since age 11.  She has had 7 psychiatric admissions in the past 2 years at [facility] and [facility].  She is currently admitted to [facility], station 30 under the care of Dr. Payton after presenting to the [facility] Emergency Room complaining of worsening depression and suicidal ideation.  She is seen by Dr. Cabrera at the request of Dr. Payton to evaluate second opinion for ECT.      HISTORY OF PRESENT ILLNESS:  Ms. Parham was staffed by Dr. Cabrera on 02/18/2020.  She had most recently been at [facility] 11/25/2019 through 01/16/2020.  That admission was also prompted by depression and suicidal ideation.  During that admission, a petition for commitment was supported along with a court order for Robles Sr.  She was treated with Rexulti and Trintellix and Remeron.  She was switched from Trintellix to Effexor XR.  She had 6 bilateral ECT treatments done at [facility], with improvement in mood.  She did not have any way to do outpatient ECT and has not had any ECT since.  She was treated with Lamictal, Effexor XR, Rexulti, Vistaril and  Remeron.  This admission, she presented to [facility] Emergency Room.  She had been placed on a 72-hour hold by Dr. Madrigal and attempted to elope from his office and indeed went home.  Police found her and brought her back to the emergency room.  Stressors included a 5-year separation from her  and not being able to work due to her depression.  She was taking her sleeping medication during the daytime in order to sleep and escape.  She could not think of any reason to live, including her children.  She was felt to be unsafe for discharge admitted to [facility], station 30.  She has a job at [employer], but had not worked since 10/2019.  She was on a provisional discharge with a [employer] Sr and commitment through [facility].  She has not been compliant with care.  She was refusing medication.  She was refusing ECT.  She had been referred to the day treatment program at [facility] with a suggestion to consider TMS.  She did not adhere to any of those recommendations.  She reportedly made a suicide attempt by taking 3 tablets of hydroxyzine and an extra Remeron.  Mood had been worsening over the past couple of weeks.  She was hating life.  She felt she was accomplishing nothing and going nowhere.  Her estranged  was reported to be a support.  She was anhedonic, sleeping excessively, and had not eaten in days.  She had poor energy, amotivation, poor concentration and ruminative thoughts.  She felt worthless, hopeless and helpless.  She did not want to cooperate with care.  She was quite anxious.  She acknowledged poor coping skills, low frustration tolerance, interpersonal conflicts with others.  She denied jey or psychosis.  She denied PTSD, OCD or eating disorder, although she stated that she binges at times and describes herself as an emotional eater.  Dr. Payton has recommended continuing with ECT.      Ms. Parham says she is  "currently admitted to the hospital because she had a disagreement with Dr. Madrigal.  He wanted her to continue ECT.  She did not want that.  She had some memory loss from her bilateral ECT at [facility] and therefore did not want to continue it.  She says when she left the hospital a month ago, her mood was medium.  Over the last 2 weeks, her mood went downhill.  She had a disagreement with her estranged .  She has ongoing financial problems.  Mood is quite depressed as well as anxious.  Appetite is poor.  Weight is \"up and down.\"  She is anhedonic.  Energy level is poor.  Libido is poor.  Concentration is poor.  She feels hopeless, helpless, worthless, and guilty.  She denies crying spells.  She has had suicidal thoughts off and on since age 11.  Those increased prior to admission.  She was planning on overdosing and had indeed taken extra medications.  She saw no reason to live.  She denied homicidal ideation.  She denied psychotic symptoms.  She denied panic attacks.  She does endorse generalized anxiety symptoms, including chronic excessive worry, muscle tension, restlessness, keyed-up feeling, poor concentration, fatigue, irritability (although she holds it in), and sleep disturbance.  She denies PTSD, obsessive-compulsive symptoms, health concerns, eating disorder or gambling.  Memory has been foggy, which she blames on ECT.      PAST PSYCHIATRIC HISTORY:  Ms. Parham has had depression since age 11.  She has had 7 psychiatric admissions at [facility] and [facility] between 02/2019 and 02/2020.  Her outpatient psychiatrist is Dr. Madrigal, although records indicate she has also seen Drew Juarez, nurse practitioner at [facility].  She has been treated for depression with therapy and medications since her 30s.  She has been on many psychotropic medications over the years, including but not limited to, hydroxyzine, Zoloft, Trintellix, Lamictal, trazodone, Remeron, " Rexulti, Effexor XR and likely others.  She has no history of suicide attempts.  She has been through DBT twice.  She reports a history of suicidal gestures, but did not consider them suicide attempts.  She had ECT at [facility] with positive response but does not want to continue because of forgetfulness.  Psychiatrist is Dr. Madrigal.  She has no therapist.  She says her current medications are of some benefit.  Ms. Parham was also admitted to [facility] in 2015.      CHEMICAL DEPENDENCY HISTORY:  Ms. Parham says she did not try alcohol until her 20s.  Alcohol was never a problem.  She does not drink.  She does not like it.  She denies blackouts, withdrawal symptoms, detox visits, DTs, seizures, DWIs or chemical dependency treatment.  She does not use drugs or tobacco.      PAST MEDICAL HISTORY:  Primary care physician is Monse Chen at [facility].  She had a cholecystectomy.  She denies head injuries or seizures.      MEDICATIONS:      Current Facility-Administered Medications:      acetaminophen (TYLENOL) tablet 650 mg, 650 mg, Oral, Q4H PRN, Puneet Arevalo APRN CNP, 650 mg at 02/18/20 0315     alum & mag hydroxide-simethicone (MAALOX  ES) suspension 30 mL, 30 mL, Oral, Q4H PRN, Puneet Arevalo APRN CNP     bisacodyl (DULCOLAX) Suppository 10 mg, 10 mg, Rectal, Daily PRN, Puneet Arevalo APRN CNP     brexpiprazole (REXULTI) tablet 3 mg, 3 mg, Oral, Daily, Oleg Payton MD     hydrOXYzine (ATARAX) tablet 25-50 mg, 25-50 mg, Oral, TID PRN, Puneet Arevalo APRN CNP     lamoTRIgine (LaMICtal) tablet 300 mg, 300 mg, Oral, Daily, Puneet Arevalo APRN CNP     magnesium hydroxide (MILK OF MAGNESIA) suspension 30 mL, 30 mL, Oral, At Bedtime PRN, Puneet Arevalo APRN CNP     mirtazapine (REMERON) tablet 45 mg, 45 mg, Oral, At Bedtime, Puneet Arevalo APRN CNP, 45 mg at 02/18/20  0315     OLANZapine (zyPREXA) tablet 5-10 mg, 5-10 mg, Oral, Q2H PRN **OR** OLANZapine (zyPREXA) injection 5-10 mg, 5-10 mg, Intramuscular, Q2H PRN, Anicetoeva, Ruthliya Enioeva, APRN CNP     traZODone (DESYREL) tablet 50 mg, 50 mg, Oral, At Bedtime PRN, Maceya, Puneet Steeneva APRN CNP     venlafaxine (EFFEXOR-ER) 24 hr tablet 225 mg, 225 mg, Oral, Daily with breakfast, Miltcheva, Aneliya Mintcheva, APRN CNP      ALLERGIES:  NO KNOWN DRUG ALLERGIES.      FAMILY HISTORY:  There is a history of alcoholism in mother and aunt.  She said her mother had some depression.  Father possibly had some mental illness and chemical dependence.  The alcoholic aunt also had depression.  She denies a family history of suicide.  Her oldest son has some mental health problems.      SOCIAL HISTORY:  Ms. Parham was born in the [place] and raised in [place] by her mother.  She has 2 sisters and a half-sister.  She has no brothers.  Parents  when she was poor.  She lives with her mother, but saw her father some too.  Father worked tool and die.  Mother had some sort of computer job.  She denied any history of physical, sexual or emotional abuse.  Ms. Parham graduated high school.  She  in 2001.  They have been  for 5 years, but her  is still a support.  She lives in [place] with her mother, ary, 22-year-old stepdaughter and son age 13.  She has a 16-year-old son who lives with Ms. Parham's sister.  Ms. Parham has a job at [employer] since 2001 but has not worked in several months due to her depression.  Her estranged  is a .  Her  is from [place].  They have 2 biological children together.  She denies legal problems.  She was never in the .      MENTAL STATUS EXAMINATION:  Ms. Parham is a 34-year-old  woman looking somewhat older than her stated age.  Gait and station are normal.  Psychomotor activity is within normal limits.  Speech is fluent and  normal in rate.  Language is normal.  Mood is depressed and anxious.  Affect is sad.  Attention and concentration appear adequate.  Thought process is normal.  Associations are normal.  She denied any psychotic symptoms.  She endorsed suicidal thoughts with plans to overdose.  She denied homicidal thoughts.  Fund of knowledge is adequate.  Remote and recent memory are adequate.  Insight and judgment are impaired by her depression and suicidality.  She was alert and oriented x 3.  There was no evidence of movement disorder.      ASSESSMENT:  Ms. Parham is a 34-year-old woman with a long history of depression, anxiety and borderline personality disorder.  She has had many psychiatric admissions, mostly at [facility] and [facility].  She is currently committed with a Robles Sr order for electroconvulsive therapy.  She had some ECT at [facility].  She had bilateral treatment and complained of forgetfulness and refused further treatment.  Dr. Payton is recommending ECT.  Dr. Cabrera spoke with Ms. Parham about ECT.  She says ECT did help, but she did not like the forgetfulness from her bilateral ECT.  She is committed with a Robles Sr, although at this point it does not appear that [facility] is included in the court order.  The legal issues would need to be worked out before ECT could be commenced.  ECT does seem a reasonable treatment option in light of the treatment-refractory nature of her depression and positive response to ECT, although she did have complaints of forgetfulness with bilateral ECT.      DIAGNOSES:   AXIS I:  Major depressive disorder, recurrent; generalized anxiety disorder.   AXIS II:  Borderline personality disorder.   AXIS III:  No diagnosis.      PLAN:   1.  Medically clear for ECT.   2.  Risk management is currently reviewing the legal paperwork.  It appears that the Robles Sr will need to be amended to include [facility].   3.  Once she is medically cleared  and once risk management gives the go ahead, we will proceed with right-sided unilateral ultrabrief ECT   4.  Expect stabilization and discharge to home.         LAY LAZO MD             D: 2020   T: 2020   MT: VADIM      Name:     CHASITY PERKINS   MRN:      [id number]        Account:       [id number]   :      1985           Consult Date:  2020      Document: [id number]

## 2020-02-19 NOTE — PROGRESS NOTES
"Patient has spent the entire shift in her room sleeping/napping. Did not attend group. Declined dinner and snack. Declined her medication. Denies suicidal ideation and self injurious thoughts.Denies auditory and visual hallucinations.Reports being depressed and anxious. Poor ADL's. Affect is flat. Did not take phone calls when paged.     Patient evaluation continues. Assessed mood,anxiety,thoughts and behavior.     Patient gradually progressing towards goals.    Patient is encouraged to participate in groups and assisted to develop healthy coping skills.     VS reviewed: /75   Pulse 82   Temp 98.1  F (36.7  C) (Oral)   Ht 1.651 m (5' 5\")   Wt 105.2 kg (232 lb)   SpO2 98%   BMI 38.61 kg/m      Length of stay: 0    Refer to daily team meeting notes for individualized plan of care. Nursing will continue to assess.      "

## 2020-02-19 NOTE — PROGRESS NOTES
patient spent most of the shift in her room, only coming out for brief excursions. She declined a check in, saying she felt about the same as yesterday and did not have anything to talk about.       02/19/20 2641   Behavioral Health   Hallucinations denies / not responding to hallucinations   Thinking poor concentration   Orientation person: oriented;place: oriented;date: oriented;time: oriented   Memory baseline memory   Insight poor   Judgement impaired   Eye Contact at examiner   Affect blunted, flat   Mood mood is calm   Physical Appearance/Attire disheveled;untidy   Hygiene neglected grooming - unclean body, hair, teeth   Suicidality chronic thoughts with no stated plan   1. Wish to be Dead (Recent) Yes   2. Non-Specific Active Suicidal Thoughts (Recent) Yes   Activity isolative;withdrawn   Speech clear;coherent   Medication Sensitivity no stated side effects;no observed side effects   Psychomotor / Gait balanced;steady   Psycho Education   Type of Intervention 1:1 intervention   Response participates, initiates socially appropriate   Hours 0.5   Activities of Daily Living   Hygiene/Grooming prompts   Oral Hygiene prompts   Dress independent   Laundry with supervision   Room Organization independent

## 2020-02-19 NOTE — CONSULTS
[facility]  Internal Medicine Consult    Misty Parham MRN# [id number]   Age: 34 year old YOB: 1985     Date of Admission: 2/18/2020  Date of Consult:  2/19/2020    Requesting Service: Behavioral Health - Shari Mcfadden MD  Reason for Consult: Pre ECT Medicine Evaluation   Indication for ECT: Ongoing severe depression    Chief Complaint: Tired  HPI: Misty Parham is a 34 year old female with a hx of depression who was admitted to [facility] station 30N due to worsening depression and SI. IM was asked to see this patient in regards to ECT clearance.   Since admission Misty has been isolated in her room. She has had very minimal to eat or drink as she states that has no appetite. This was apparently going on prior to admission as well. She notes recent treatment with ECT and states that she feels it overall helps a little, however she does not like the side effects of memory loss and doesn't feel like it helps enough. She endorses dizziness upon standing was is asymptomatic while laying in bed. She denies any chest pain, shortness of breath or difficulty breathing. She endorses making urine as well as regular bowel movements.       Review of Systems:   Cardiovascular: negative  Pulmonary: No shortness of breath, dyspnea on exertion, cough, or hemoptysis  Neurological: negative    Past Medical History:   Prior Anesthesia: Yes  If yes, any complications: No    Prior ECT: Yes  If yes,   January 2020  Response: mild improvement  Side Effects: memory loss    Cardiovascular: CAD No, MI No, HTN No, CHF No, pacemaker or ICD No  Pulmonary: Asthma/COPD No, Prior respiratory failure or need for emergent intubation No, On theophylline No (note that theophylline use is a contraindication to proceeding with ECT, needs to be tapered off prior)  Neurological: Brain tumor No, TIA/CVA No, Dementia No, Neuromuscular Disease (including post polio syndrome) No, Seizures and/or Epilepsy No, Head Injury No,  "Intracranial Hemorrhage No    Past Surgical History:   Past Surgical History:   Procedure Laterality Date     CHOLECYSTECTOMY         Allergies:    No Known Allergies    Medication list reviewed.    Physical Exam:  /81   Pulse 71   Temp 97.9  F (36.6  C) (Oral)   Resp 16   Ht 1.651 m (5' 5\")   Wt 105.2 kg (232 lb)   SpO2 98%   BMI 38.61 kg/m     Cardiovascular: RRR. S1, S2. No murmurs, rubs, gallops.   Pulmonary: Effort normal. Lungs CTAB with no wheezing, rales, rhonchi.   Neurological: A&Ox3. No focal deficits. PERRLA.     Data:  EKG:Normal, Normal sinus rhythm   normal EKG, normal sinus rhythm  Head Imaging: none  Labs:   CBC:  Recent Labs   Lab Test 02/17/20 2022   WBC 9.2   RBC 4.31   HGB 12.7   HCT 39.1   MCV 91   MCH 29.5   MCHC 32.5   RDW 14.4        CMP:  Recent Labs   Lab Test 02/17/20 2022      POTASSIUM 3.8   CHLORIDE 107   CRISTINE 9.1   CO2 25   BUN 9   CR 0.86   GLC 86   AST 13   ALT 15   BILITOTAL 0.4   ALBUMIN 3.8   PROTTOTAL 7.6   ALKPHOS 78     HCG:   RESULTS PENDING    Recommendations:   IF poor PO intake continues and is associated with low blood pressure or tachycardia, would consider fluid bolus.    Patient does not have absolute medical contraindications to proceeding with ECT at this time pending negative urine HCG. Treatment plan per psychiatry.       Samara Montana PA-C  Internal Medicine [facility] Hospitalist  [facility]  Pager: [phone]    "

## 2020-02-20 PROCEDURE — 25000132 ZZH RX MED GY IP 250 OP 250 PS 637: Performed by: PSYCHIATRY & NEUROLOGY

## 2020-02-20 PROCEDURE — 25000132 ZZH RX MED GY IP 250 OP 250 PS 637: Performed by: NURSE PRACTITIONER

## 2020-02-20 PROCEDURE — 12400001 ZZH R&B MH UMMC

## 2020-02-20 PROCEDURE — 99233 SBSQ HOSP IP/OBS HIGH 50: CPT | Performed by: PSYCHIATRY & NEUROLOGY

## 2020-02-20 RX ADMIN — VENLAFAXINE HYDROCHLORIDE 225 MG: 225 TABLET, EXTENDED RELEASE ORAL at 08:36

## 2020-02-20 RX ADMIN — MIRTAZAPINE 45 MG: 45 TABLET, FILM COATED ORAL at 21:31

## 2020-02-20 RX ADMIN — LAMOTRIGINE 300 MG: 150 TABLET ORAL at 08:36

## 2020-02-20 RX ADMIN — BREXPIPRAZOLE 3 MG: 1 TABLET ORAL at 08:36

## 2020-02-20 ASSESSMENT — ACTIVITIES OF DAILY LIVING (ADL)
ORAL_HYGIENE: INDEPENDENT
DRESS: INDEPENDENT;SCRUBS (BEHAVIORAL HEALTH)
HYGIENE/GROOMING: INDEPENDENT
DRESS: INDEPENDENT
HYGIENE/GROOMING: PROMPTS
LAUNDRY: UNABLE TO COMPLETE
ORAL_HYGIENE: INDEPENDENT
LAUNDRY: WITH SUPERVISION

## 2020-02-20 NOTE — PROGRESS NOTES
Patient has been in her room the entire shift. Declined her dinner tray. Not tending to adl's, odorous. Med compliant with her HS medication. Patient was encouraged to come out her room to the lounge but declined. Offered patient a boost but declined.

## 2020-02-20 NOTE — PROGRESS NOTES
"[facility], [place]   Psychiatric Progress Note  Hospital Day: 2        Interim History:   The patient's care was discussed with the treatment team during the daily team meeting and/or staff's chart notes were reviewed.  Staff report patient isolated in room, did not come out for meals or medications, declined dinner tray, reports no improvement since admission. No acute events overnight.     Upon interview, the patient was lying in her bed but was awake.  She agreed to come out of her room and meet with writer in the interview room.  She reports she had some lightheadedness and dizziness upon standing.  She is still not eaten or drank anything for her over 2 days.  She continues to decline wanting anything that is offered to her.  She continues to report she is doing this because she \"wants to be in pain\".  She continues to have suicidal ideation but denies plan or intent while in the hospital.  She was updated that team has sent a letter to the Psychiatric hospital to have prior [facility]en order changed in order to pursue ECT at this facility.  She expressed understanding and asked how long this process would take and writer shared that they were not sure of the timeframe for this.  She reports she is tolerating her medications without noted side effects.  She continues to states she is not sure what would be helpful and has minimal engagement in the interview.  No additional concerns at this time.    Writer updated IM regarding patient's continued lack of oral intake and they will continue to follow.         Medications:       brexpiprazole  3 mg Oral Daily     lamoTRIgine  300 mg Oral Daily     mirtazapine  45 mg Oral At Bedtime     venlafaxine  225 mg Oral Daily with breakfast          Allergies:   No Known Allergies       Labs:     Recent Results (from the past 48 hour(s))   EKG 12-lead, complete    Collection Time: 02/18/20  8:07 PM   Result Value Ref Range    Interpretation ECG Click View Image " "link to view waveform and result    Lipid panel    Collection Time: 02/19/20  7:58 AM   Result Value Ref Range    Cholesterol 153 <200 mg/dL    Triglycerides 109 <150 mg/dL    HDL Cholesterol 44 (L) >49 mg/dL    LDL Cholesterol Calculated 87 <100 mg/dL    Non HDL Cholesterol 109 <130 mg/dL   TSH with free T4 reflex and/or T3 as indicated    Collection Time: 02/19/20  7:58 AM   Result Value Ref Range    TSH 1.08 0.40 - 4.00 mU/L   Drug abuse screen 8 urine (UR)    Collection Time: 02/19/20 12:14 PM   Result Value Ref Range    Amphetamine Qual Urine Negative NEG^Negative    Barbiturates Qual Urine Negative NEG^Negative    Benzodiazepine Qual Urine Negative NEG^Negative    Cannabinoids Qual Urine Negative NEG^Negative    Cocaine Qual Urine Negative NEG^Negative    Ethanol Qual Urine Negative NEG^Negative    Opiates Qualitative Urine Negative NEG^Negative    PCP Qual Urine Negative NEG^Negative   HCG qualitative urine    Collection Time: 02/19/20 12:14 PM   Result Value Ref Range    HCG Qual Urine Negative NEG^Negative          Psychiatric Examination:     /71   Pulse 91   Temp 97.9  F (36.6  C) (Oral)   Resp 16   Ht 1.651 m (5' 5\")   Wt 105.2 kg (232 lb)   SpO2 96%   BMI 38.61 kg/m    Weight is 232 lbs 0 oz  Body mass index is 38.61 kg/m .    Orthostatic Vitals     None        Appearance: awake, alert and adequately groomed, lying in bed but got up and came to interview room  Attitude:  guarded and somewhat cooperative  Eye Contact:  fair  Mood:  depressed  Affect:  mood congruent and intensity is flat  Speech:  minimal, soft volume, was clear and coherent when speaking  Language: fluent and intact in English  Psychomotor, Gait, Musculoskeletal: slowed;  no evidence of tardive dyskinesia, dystonia, or tics  Thought Process:  linear  Associations:  no loose associations  Thought Content:  no evidence of psychotic thought and active suicidal ideation present  Insight:  limited  Judgement:  limited  Oriented " to:  time, person, and place  Attention Span and Concentration:  intact  Recent and Remote Memory:  intact  Fund of Knowledge:  appropriate    Clinical Global Impressions  First:  Considering your total clinical experience with this particular patient population, how severe are the patient's symptoms at this time?: 7 (02/19/20 1117)  Compared to the patient's condition at the START of treatment, this patient's condition is:: 4 (02/19/20 1117)  Most recent:  Considering your total clinical experience with this particular patient population, how severe are the patient's symptoms at this time?: 7 (02/19/20 1117)  Compared to the patient's condition at the START of treatment, this patient's condition is:: 4 (02/19/20 1117)           Precautions:     Behavioral Orders   Procedures     Code 1 - Restrict to Unit     Electroconvulsive therapy     Once Risk management has given the go ahead and once she is medically cleared, begin ECT.  That is not likely to happen on 2/19/20 but at a future date.  This is just a heads-up that this is in the works.      ECT every Monday, Wednesday, and Friday     Elopement precautions     Routine Programming     As clinically indicated     Status 15     Every 15 minutes.     Suicide precautions     Patients on Suicide Precautions should have a Combination Diet ordered that includes a Diet selection(s) AND a Behavioral Tray selection for Safe Tray - with utensils, or Safe Tray - NO utensils            Diagnoses:   1.  Major depressive disorder, severe, with suicidal ideations without psychotic features  2.  Borderline personality disorder.   3.  Anxiety, not otherwise specified.          Assessment & Plan:   Assessment and hospital summary:  33 yo female with above diagnosis admitted to adult unit on a 72-hour hold.  She reportedly expressed active suicidal ideations while attending an appointment with her outpatient provider, Dr. Madrigal.  She also alleged that she attempted an overdose  "but not close to \"a lethal dose\", hoping that she will damage her kidneys in the long run.  She was committed during the most recent hospitalization at [facility] with [facility] and [facility] Sr orders.  She is declining ECT.  She failed to adhere to post-discharge recommendations from last hospitalization.  She has been declining medications and expressed desire not to cooperate with care.  She has not eaten or been drinking anything since yesterday per patient and staff report aside from minimal water with taking medications this morning. Discussed case with Medical Director Dr. Hargrove, paper work has been sent to [facility] to add [facility] to Travis Sr but also explored urgently transfer patient back to [facility] in order to get ECT given patients decompensation however per patient's prior Andersen order it specifies that she is only to be given 6 acute series treatments which she has already received.  It appears that order needs to be revised so she may receive additional treatments at this facility which would be warranted given the noted improvement she had with previous treatments in her current level of decompensation.  Have continued to discuss patient's lack of oral intake with internal medicine who do not feel like she yet meets criteria to receive IV fluids or other interventions which would warrant the use of ECT for medical emergency.    Psychiatric treatment/inteventions:  Medications:   -Continue PTA brexpiprazole 3 mg daily  -Continue PTA lamotrigine 300 mg daily  -Continue PTA mirtazapine 45 mg at bedtime  -Continue PTA venlafaxine 225 mg daily     Laboratory/Imaging:  Utox ordered as not completed PTA, no other new labs per psychiatry, reviewed admission labs:  2/17/2020: CMP WNL; urine hCG negative; CBC WNL; acetaminophen level <2;   2/19/2020: lipid panel with HDL 44 (L) otherwise WNL; TSH 1.08     Patient will be treated in therapeutic milieu with appropriate " individual and group therapies as described.     Medical treatment/interventions:  Medical concerns:   1) Medical clearance for ECT  -IM consult placed by Dr. Cabrera, appreciate assistance    2) Patient with limited oral intake including fluids  -IM consult placed as above to assist in continued need for monitoring and other interventions      This note was created by undersigned using a Dragon dictation system. All typing errors or contextual distortion are unintentional and software inherent.     Disposition Plan   Reason for ongoing admission: poses an imminent risk to self  Discharge location: TBD  Discharge Medications: not ordered  Follow-up Appointments: not scheduled  Legal Status: admitted on 72 HH; does have commitment with Travis Sr at [facility], PD has been revoked, working to amend Travis Andersen order for patient to undergo ECT at this facility.  Entered by: Shari Mcfadden on 2/20/2020 at 7:53 AM

## 2020-02-20 NOTE — PROGRESS NOTES
Pt stayed in bed all day, sleeping for the majority.  Visible in milieu only once.  Didn't eat anything, neglected grooming.  Reports depression at a 10/10 and anxiety at a 5/10.  Denies SI/SIB and medication side effects.  Is hungry but doesn't want to eat.         02/20/20 1400   Behavioral Health   Hallucinations denies / not responding to hallucinations   Orientation person: oriented;place: oriented   Memory baseline memory   Insight poor   Judgement impaired   Eye Contact at examiner   Affect sad;blunted, flat   Mood depressed;anxious   Physical Appearance/Attire disheveled   Hygiene neglected grooming - unclean body, hair, teeth   Suicidality other (see comments)  (denies)   1. Wish to be Dead (Recent) No   2. Non-Specific Active Suicidal Thoughts (Recent) No   Self Injury other (see comment)  (denies)   Activity isolative;withdrawn   Speech clear;coherent   Medication Sensitivity no stated side effects;no observed side effects   Psychomotor / Gait balanced;steady   Psycho Education   Type of Intervention 1:1 intervention   Response participates, initiates socially appropriate   Hours 0.5   Treatment Detail check in   Activities of Daily Living   Hygiene/Grooming independent   Oral Hygiene independent   Dress independent;scrubs (behavioral health)   Laundry unable to complete   Room Organization independent

## 2020-02-20 NOTE — PROGRESS NOTES
02/19/20 6048   Significant Event   Significant Event Other (see comments)  (Shift Summary)   Patient had a quiet shift.    Patient did not require seclusion/restraints to manage behavior.    Misty Parham did not participate in groups and was not visible in the milieu.    Visitors during this shift included none.     Other information about this shift: Pt. stayed in her room the entire shift. She did eat some of her dinners.[email or web address]

## 2020-02-20 NOTE — PROGRESS NOTES
"CLINICAL NUTRITION SERVICES - ASSESSMENT NOTE     Nutrition Prescription    RECOMMENDATIONS FOR MDs/PROVIDERS TO ORDER:  If pt continues to refuse fluids and food consider giving fluids. If pt continues to refuse all PO x3-4 days consider enteral nutrition.     Malnutrition Status:    Patient does not meet two of the established criteria necessary for diagnosing malnutrition but is at risk for malnutrition    Recommendations already ordered by Registered Dietitian (RD):  None at this time- pt refusal     Future/Additional Recommendations:  Monitor PO intake and need for fluid/nutrtion support     REASON FOR ASSESSMENT  Misty Parham is a/an 34 year old female assessed by the dietitian for Provider Order - Patient declining meal trays, minimal intake since admission    Pt admitted for worsening depression and SI    NUTRITION HISTORY  Pt reports that she was eating PTA and that she has never restricted her PO intake before. Misty reports that she does not want to eat or drink anything. Misty reports that she doesn't want to do anything right now.     CURRENT NUTRITION ORDERS  Diet: Regular  Intake/Tolerance:   Per PA note 2/19: \"Since admission Misty has been isolated in her room. She has had very minimal to eat or drink as she states that has no appetite. This was apparently going on prior to admission as well.\"     Pt reports that she has not had anything to eat or drink since Monday (2/17) morning. RN reports that she took a small sip of water with her meds this morning but would not drink anything else. Untouched Boost and water at her bedside.     LABS  Labs reviewed    MEDICATIONS  Medications reviewed  Remeron    ANTHROPOMETRICS  Height: 165.1 cm (5' 5\")  Most Recent Weight: 105.2 kg (232 lb)    IBW: 56.8 kg (185%)   BMI: Obesity Grade II BMI 35-39.9  Weight History: Wt stable over the past year.  Wt Readings from Last 10 Encounters:   02/18/20 105.2 kg (232 lb)   02/17/20 103 kg (227 lb)   01/14/20 103.2 kg " (227 lb 9.6 oz)   10/22/19 104.8 kg (231 lb)   09/17/19 100.3 kg (221 lb 1.6 oz)   07/02/19 104.9 kg (231 lb 4.8 oz)   01/15/19 106.4 kg (234 lb 9.6 oz)   03/25/18 108 kg (238 lb)   03/22/18 113.4 kg (250 lb)   02/07/18 110.5 kg (243 lb 8 oz)     Dosing Weight: 69 kg (adjusted based on most recent wt and IBW)     ASSESSED NUTRITION NEEDS  Estimated Energy Needs: [id number] kcals/day (20 - 25 kcals/kg)  Justification: Obese  Estimated Protein Needs:  grams protein/day (1.2 - 1.5 grams of pro/kg)  Justification: Obesity guidelines  Estimated Fluid Needs: 6237-6854 mL/day (25 - 30 mL/kg)   Justification: Maintenance    PHYSICAL FINDINGS  See malnutrition section below.  Dry lips     MALNUTRITION  % Intake: </= 50% for >/= 5 days (severe)  % Weight Loss: None noted  Subcutaneous Fat Loss: None observed  Muscle Loss: None observed  Fluid Accumulation/Edema: None noted  Malnutrition Diagnosis: Patient does not meet two of the established criteria necessary for diagnosing malnutrition but is at risk for malnutrition    NUTRITION DIAGNOSIS  Inadequate oral intake related to decreased appetite as evidenced by pt refusal of PO intake x4 days    INTERVENTIONS  Implementation  Nutrition Education: Discussed current PO intake and role of RD. Discussed importance of adequate hydration and nutrition. Discussed beverage and food options.    Medical food supplement therapy: Pt continues to decline Boost      Goals  Patient to consume % of nutritionally adequate meal trays TID, or the equivalent with supplements/snacks.     Monitoring/Evaluation  Progress toward goals will be monitored and evaluated per protocol.    Deborah Mcneil RD, LD  Unit pager: [phone]

## 2020-02-20 NOTE — PROVIDER NOTIFICATION
"   02/20/20 0854   Vital Signs   Temp 97.5  F (36.4  C)   Temp src Tympanic   Resp 16   Pulse 95   Pulse/Heart Rate Source Monitor   /78   BP Location Right arm   Lying Orthostatic BP   Lying Orthostatic /78   Lying Orthostatic Pulse 95 bpm     Pt has not had any oral intake since Monday morning as reported by pt.  Pt continues to refuse oral intake.  This WR offered to bring her breakfast tray to her room, pt stated \"I do not want to eat\".  Pt did take her medication with a sip of water, writer encouraged pt to drink water pt declined.  Pt was offered boost but declined.  Pt reports feeling lightheaded and dizzy when she gets out of bed.  Encouraged to drink water but pt declined.  Affect is flat blunted appears depressed.  Will continue to assess.      "

## 2020-02-20 NOTE — PROGRESS NOTES
WR spoke to Robbie De La Torre  involved with the commitment of Pt. WR explained that pt is on our unit and cannot be treated by ECT due to specificity of Travis Gordon. Robbie instructed WR to e-mail a letter asking to amend the commitment, Bhargav and Travis Gordon to: [email or web address]     WR received an email from [facility] from Pt's newly assigned CM through their [employer]. Fax had information for the pt and included an GEORGI that needed to be signed. Pt signed it. CM is Radhika Hugo [phone].

## 2020-02-21 ENCOUNTER — HOSPITAL ENCOUNTER (INPATIENT)
Facility: CLINIC | Age: 35
LOS: 64 days | Discharge: HOME | End: 2020-04-25
Attending: PSYCHIATRY & NEUROLOGY | Admitting: PSYCHIATRY & NEUROLOGY
Payer: COMMERCIAL

## 2020-02-21 VITALS
BODY MASS INDEX: 38.65 KG/M2 | WEIGHT: 232 LBS | HEIGHT: 65 IN | RESPIRATION RATE: 16 BRPM | HEART RATE: 109 BPM | TEMPERATURE: 98 F | DIASTOLIC BLOOD PRESSURE: 88 MMHG | SYSTOLIC BLOOD PRESSURE: 120 MMHG | OXYGEN SATURATION: 97 %

## 2020-02-21 DIAGNOSIS — F33.2 SEVERE EPISODE OF RECURRENT MAJOR DEPRESSIVE DISORDER, WITHOUT PSYCHOTIC FEATURES (H): Primary | ICD-10-CM

## 2020-02-21 DIAGNOSIS — F60.3 BORDERLINE PERSONALITY DISORDER (H): ICD-10-CM

## 2020-02-21 DIAGNOSIS — R45.851 DEPRESSION WITH SUICIDAL IDEATION: ICD-10-CM

## 2020-02-21 DIAGNOSIS — R45.89 SUICIDAL BEHAVIOR WITHOUT ATTEMPTED SELF-INJURY: ICD-10-CM

## 2020-02-21 DIAGNOSIS — F32.A DEPRESSION WITH SUICIDAL IDEATION: ICD-10-CM

## 2020-02-21 DIAGNOSIS — F51.01 PRIMARY INSOMNIA: ICD-10-CM

## 2020-02-21 DIAGNOSIS — F33.3 SEVERE RECURRENT MAJOR DEPRESSIVE DISORDER WITH PSYCHOTIC FEATURES (H): ICD-10-CM

## 2020-02-21 LAB
ANION GAP SERPL CALCULATED.3IONS-SCNC: 10 MMOL/L (ref 3–14)
BUN SERPL-MCNC: 23 MG/DL (ref 7–30)
CALCIUM SERPL-MCNC: 9.3 MG/DL (ref 8.5–10.1)
CHLORIDE SERPL-SCNC: 107 MMOL/L (ref 94–109)
CO2 SERPL-SCNC: 24 MMOL/L (ref 20–32)
CREAT SERPL-MCNC: 0.81 MG/DL (ref 0.52–1.04)
GFR SERPL CREATININE-BSD FRML MDRD: >90 ML/MIN/{1.73_M2}
GLUCOSE SERPL-MCNC: 68 MG/DL (ref 70–99)
POTASSIUM SERPL-SCNC: 3.9 MMOL/L (ref 3.4–5.3)
SODIUM SERPL-SCNC: 141 MMOL/L (ref 133–144)

## 2020-02-21 PROCEDURE — 36415 COLL VENOUS BLD VENIPUNCTURE: CPT | Performed by: PSYCHIATRY & NEUROLOGY

## 2020-02-21 PROCEDURE — 80048 BASIC METABOLIC PNL TOTAL CA: CPT | Performed by: PSYCHIATRY & NEUROLOGY

## 2020-02-21 PROCEDURE — 12400000 ZZH R&B MH

## 2020-02-21 PROCEDURE — 99239 HOSP IP/OBS DSCHRG MGMT >30: CPT | Performed by: PSYCHIATRY & NEUROLOGY

## 2020-02-21 PROCEDURE — 25000132 ZZH RX MED GY IP 250 OP 250 PS 637: Performed by: PSYCHIATRY & NEUROLOGY

## 2020-02-21 PROCEDURE — 25000132 ZZH RX MED GY IP 250 OP 250 PS 637: Performed by: NURSE PRACTITIONER

## 2020-02-21 RX ORDER — HYDROXYZINE HYDROCHLORIDE 25 MG/1
25-50 TABLET, FILM COATED ORAL 3 TIMES DAILY PRN
Status: DISCONTINUED | OUTPATIENT
Start: 2020-02-21 | End: 2020-04-25 | Stop reason: HOSPADM

## 2020-02-21 RX ORDER — LAMOTRIGINE 100 MG/1
300 TABLET ORAL DAILY
Status: DISCONTINUED | OUTPATIENT
Start: 2020-02-22 | End: 2020-03-16

## 2020-02-21 RX ORDER — MIRTAZAPINE 45 MG/1
45 TABLET, FILM COATED ORAL AT BEDTIME
Status: DISCONTINUED | OUTPATIENT
Start: 2020-02-21 | End: 2020-04-25 | Stop reason: HOSPADM

## 2020-02-21 RX ADMIN — VENLAFAXINE HYDROCHLORIDE 225 MG: 225 TABLET, EXTENDED RELEASE ORAL at 08:46

## 2020-02-21 RX ADMIN — BREXPIPRAZOLE 3 MG: 1 TABLET ORAL at 08:46

## 2020-02-21 RX ADMIN — MIRTAZAPINE 45 MG: 45 TABLET, FILM COATED ORAL at 20:26

## 2020-02-21 RX ADMIN — LAMOTRIGINE 300 MG: 150 TABLET ORAL at 08:46

## 2020-02-21 ASSESSMENT — ACTIVITIES OF DAILY LIVING (ADL)
TOILETING: 0-->INDEPENDENT
LAUNDRY: WITH SUPERVISION
ORAL_HYGIENE: INDEPENDENT
RETIRED_COMMUNICATION: 0-->UNDERSTANDS/COMMUNICATES WITHOUT DIFFICULTY
COGNITION: 0 - NO COGNITION ISSUES REPORTED
AMBULATION: 0-->INDEPENDENT
HYGIENE/GROOMING: INDEPENDENT
RETIRED_COMMUNICATION: 0-->UNDERSTANDS/COMMUNICATES WITHOUT DIFFICULTY
FALL_HISTORY_WITHIN_LAST_SIX_MONTHS: NO
TOILETING: 0-->INDEPENDENT
HYGIENE/GROOMING: PROMPTS
DRESS: 0-->INDEPENDENT
BATHING: 0-->INDEPENDENT
AMBULATION: 0-->INDEPENDENT
BATHING: 0-->INDEPENDENT
DRESS: INDEPENDENT
TRANSFERRING: 0-->INDEPENDENT
ORAL_HYGIENE: PROMPTS
RETIRED_EATING: 0-->INDEPENDENT
DRESS: 0-->INDEPENDENT
LAUNDRY: WITH SUPERVISION
SWALLOWING: 0-->SWALLOWS FOODS/LIQUIDS WITHOUT DIFFICULTY
DRESS: SCRUBS (BEHAVIORAL HEALTH)
SWALLOWING: 0-->SWALLOWS FOODS/LIQUIDS WITHOUT DIFFICULTY
RETIRED_EATING: 0-->INDEPENDENT
FALL_HISTORY_WITHIN_LAST_SIX_MONTHS: NO
COGNITION: 0 - NO COGNITION ISSUES REPORTED
TRANSFERRING: 0-->INDEPENDENT

## 2020-02-21 ASSESSMENT — MIFFLIN-ST. JEOR: SCORE: 1695.6

## 2020-02-21 NOTE — PROGRESS NOTES
received a call from Wilma Murrieta, clinical treatment coordinator on [address] at [facility] ([phone]). She stated that patient had been admitted to [facility] on 02/18/2020. Since admission there she has been refusing all food and fluids. She stated that they could not do ECT at [facility] due to the way the Robles Sr was written. Apparently [facility] only was on the order. She stated that they had contacted the 's office who confirmed that patient can only have ECT if at [facility]. Consequently patient was transferred today from [facility] to [facility].      called and left a message for patient's [facility] , Samreen Machuca ([phone]), notifying her of patient's transfer from [facility] to [facility]. Per documentation found in [facility], patient's [facility]  filed an Intent to Revoke Provisional Discharge on 02/18/2020. Patient is currently under civil commitment in [facility] through 06/18/2020.

## 2020-02-21 NOTE — PROGRESS NOTES
"Patient has spent the entire shift in her room. Showered after prompts. Declined her dinner tray and snack. Denies suicidal ideation and self injurious thoughts but says that she has a plan if she were not hospitalized but didn't want to elaborate. Reports having anxiety and depression. Denies auditory and visual hallucinations. Affect is flat and appears sad. Med compliant.      Patient evaluation continues. Assessed mood,anxiety,thoughts and behavior.     Patient gradually progressing towards goals.    Patient is encouraged to participate in groups and assisted to develop healthy coping skills.     VS reviewed: /76   Pulse 93   Temp 97.8  F (36.6  C) (Oral)   Resp 16   Ht 1.651 m (5' 5\")   Wt 105.2 kg (232 lb)   SpO2 96%   BMI 38.61 kg/m      Length of stay: 2    Refer to daily team meeting notes for individualized plan of care. Nursing will continue to assess.      "

## 2020-02-21 NOTE — PROGRESS NOTES
"Pt was admitted on a court hold from [facility] [address] and is committed with a Robles Sr.  Provisional discharge has been revoked. Pt states that she does not want ECT and that it does not help.  Consult completed at [facility] on 2/19/20 with Internal Medicine for medical clearance of ECT and request for ongoing management of poor oral intake. Pt endorses intermittent dizziness and attributes this to not eating.  Pt states she has not been eating because, \"I like to keep myself in pain.\"  Pt believes she deserves to be in pain because she is a failure.  Expressed sense of worthlessness, hopelessness.  Encouraged to stay open to the process.  Pt stated, \"I won't, I hate life.\"  Endorses SI with a plan to poison herself.  Pt reports that she has been taking extra medications over the past 3 weeks with the intent to harm herself. Verbally contracts for safety while on the unit. Flat, depressed, anxious. Soft spoken, slow to respond.      Nursing assessment complete including patient and medication profiles. Risk assessments completed addressing suicide,fall,skin,nutrition and safety issues. Care plan initiated. Assessments reviewed with physician and admit orders received. Video monitoring in progress, Patient Informed.  Welcome packet reviewed with patient. Information reviewed includes getting emergency help, preventing infections, understanding your care, using medication safely, reducing falls, preventing pressure ulcers, smoking cessation, powerful choices and Patients Bill of Rights. Pt. given tour of the unit and instruction on use of facility including emergency call light. Program schedule reviewed with patient. Questions regarding the unit addressed. Pt. Search completed and belongings inventoried.        "

## 2020-02-21 NOTE — DISCHARGE SUMMARY
Psychiatric Discharge Summary    Misty Parham MRN# [id number]   Age: 34 year old YOB: 1985     Date of Admission:  2/18/2020  Date of Discharge:  2/21/2020  Admitting Physician:  Oleg Payton MD  Discharge Physician:  Shari Mcfadden MD          Event Leading to Hospitalization:    Misty Parham is a 34-year-old  female with history of depression, anxiety and borderline personality disorder, who was brought to the Emergency Department by EMS/police due to suicidal ideation.  The patient lives with her mother.  She works at [employer] but has worked since October.  She is currently under provisional discharge with Bhargav and [facility] through [facility] and she was seen by Dr. Madrigal at the clinic for a followup.  During that visit, the patient endorsed active suicidal ideations.  Dr. Madrigal placed her on a 72-hour hold with recommendations for referral to the Emergency Department; however, patient eloped from the clinic.  He called the police and patient was picked up from her home and brought to the Emergency Department at [facility].  The patient tells me that she is refusing to do ECT.  She has not been compliant with care, marginally engaged.  She has been refusing medications.  She also continues to refuse ECT.  Based on records, the patient was hospitalized from 11/25/2019-01/16/2020.  During that time, she received a series of ECT and home medications were adjusted.  She was taken off Trintellix and started on Effexor.  She was continued on Rexulti, mirtazapine and Lamictal.  She was also referred to a day program at [facility] and suggestions were made to consider TMS if she did not want to resume maintenance ECT.  The patient failed to adhere to those recommendations, but tells me that she has been compliant with medications.  She also alleged that she attempted suicide by taking 3 tablets of hydroxyzine and an extra tablet of mirtazapine.  She tells me that she was  "not intending to harm her kidneys in a suicide attempt.      The patient was unable to identify specific triggers, but tells me that the depression has been worsening in the past 2 weeks.  She tells me that her main stressor is \"hating life, accomplishing nothing and going nowhere.\"  She lives with her mother.  She tells me that her  from whom she has been estranged from for about 5-6 years is actually supportive.  She endorsed anhedonia.  She has been sleeping excessively.  She tells me that her appetite has been poor and she has not eaten in days.  She also endorsed poor energy, motivation, concentration and focus.  She endorsed increase in ruminative thoughts.  She acknowledged that she continues to have suicidal ideations, but contracted for safety on the unit and did not report any intent.  She endorsed ongoing worthlessness, hopelessness and helplessness.  She expressed desire not to cooperate with care.  She tells me that she has a lot of anxiety.  Her anxiety seemed to be often situational.  She acknowledged poor coping skills, low tolerance for stress and interpersonal conflicts with others.  She reported no history or symptoms related to jey or psychosis.  She denied history or symptoms related to PTSD, OCD, and eating disorder; however, she tells me that she binges at times and described herself as an emotional eater.        See Admission note by Oleg Payton MD on 2/18/2020 for additional details.          Diagnoses:     Major depressive disorder, severe without psychotic features  Borderline personality disorder  Unspecified anxiety         Labs:   Admission labs:  2/17/2020: CMP WNL; urine hCG negative; CBC WNL; acetaminophen level <2  2/18/20: EKG NSR with HR 63; QTc 437ms  2/19/2020: lipid panel with HDL 44 (L) otherwise WNL; TSH 1.08; Utox negative    2/21/20: BMP with glucose 68 (L) otherwise WNL         Consults:   Consults obtained from Dr. Cabrera for ECT along with IM for medical " clearance of ECT and request for ongoing management of poor oral intake.     Per Dr. Lazo's Consult note:   ASSESSMENT:  Ms. Parham is a 34-year-old woman with a long history of depression, anxiety and borderline personality disorder.  She has had many psychiatric admissions, mostly at [facility] and [facility].  She is currently committed with a Robles Sr order for electroconvulsive therapy.  She had some ECT at [facility].  She had bilateral treatment and complained of forgetfulness and refused further treatment.  Dr. Payton is recommending ECT.  Dr. Lazo spoke with Ms. Parham about ECT.  She says ECT did help, but she did not like the forgetfulness from her bilateral ECT.  She is committed with a Robles Sr, although at this point it does not appear that [facility] is included in the court order.  The legal issues would need to be worked out before ECT could be commenced.  ECT does seem a reasonable treatment option in light of the treatment-refractory nature of her depression and positive response to ECT, although she did have complaints of forgetfulness with bilateral ECT.      DIAGNOSES:   AXIS I:  Major depressive disorder, recurrent; generalized anxiety disorder.   AXIS II:  Borderline personality disorder.   AXIS III:  No diagnosis.      PLAN:   1.  Medically clear for ECT.   2.  Risk management is currently reviewing the legal paperwork.  It appears that the Robles Sr will need to be amended to include [facility].   3.  Once she is medically cleared and once risk management gives the go ahead, we will proceed with right-sided unilateral ultrabrief ECT   4.  Expect stabilization and discharge to home.         LAY LAZO MD        Per IM Consult note:   Recommendations:   IF poor PO intake continues and is associated with low blood pressure or tachycardia, would consider fluid bolus.     Patient does not have absolute medical contraindications to proceeding with ECT  at this time pending negative urine HCG. Treatment plan per psychiatry.         aSmara Montana PA-C  Internal Medicine [facility] Hospitalist  [facility] Course:   Misty Parham was admitted to [facility] 30 with attending Oleg Payton MD on a 72 hour mental health hold under commitment to [facility] with a provisional discharge being revoked. There were no available beds at [facility] (the site listed on commitment, [facility] and Robles Sr orders) and the patients provisional discharge stated pt to be held at [facility] until able to transfer back to [facility]. The patient was placed under status 15 (15 minute checks) to ensure patient safety. Labs obtained as above. PTA medications were continued. Patients care transferred to Dr. Mcfadden 2/19/20.     Misty isolated in her room from time of admission, she left only to make phone calls and did shower on one occasion with prompting. She declined all meal trays, snacks and took medications with only small sips of water otherwise declined to drink fluids as well. Continued to coordinate with IM regarding patients lack of intake. Discussed case with Medical Director Dr. Hargrove, paper work was sent to [facility] to add [facility] to [facility] but also explored urgently transfer patient back to [facility] in order to get ECT given patients decompensation and the unknown length of time it would take for order to be amended. Patients [facility] order specifies that she is only to be given 6 acute series treatments which she has already received at [facility] but could get as many as 2 treatments per week of maintenance ECT until commitment has ended. A bed became available at [facility], discussed case with Medical Director along with Risk Management and with accepting provider Dr. Laughlin. Decision was made for patient to transfer to [facility] in order to not delay necessary treatments. Logistics of patients commitment and transferring her  "to another facility were addressed by team CTC, see their notes for complete details.     Patient was transferred via [facility].            Discharge Medications:     Current Discharge Medication List      CONTINUE these medications which have NOT CHANGED    Details   brexpiprazole (REXULTI) 3 MG tablet Take 1 tablet (3 mg) by mouth daily  Qty: 30 tablet, Refills: 0    Associated Diagnoses: Severe episode of recurrent major depressive disorder, without psychotic features (H)      hydrOXYzine (ATARAX) 25 MG tablet Take 1-2 tablets (25-50 mg) by mouth 3 times daily as needed for anxiety  Qty: 30 tablet, Refills: 0    Associated Diagnoses: Severe episode of recurrent major depressive disorder, without psychotic features (H)      lamoTRIgine (LAMICTAL) 150 MG tablet Take 300 mg by mouth daily      mirtazapine (REMERON) 45 MG tablet Take 1 tablet (45 mg) by mouth At Bedtime  Qty: 30 tablet, Refills: 0    Associated Diagnoses: Severe episode of recurrent major depressive disorder, without psychotic features (H)      venlafaxine (EFFEXOR-XR) 75 MG 24 hr capsule Take 3 capsules (225 mg) by mouth daily (with breakfast)  Qty: 90 capsule, Refills: 0    Associated Diagnoses: Severe episode of recurrent major depressive disorder, without psychotic features (H)                  Psychiatric Examination:   Appearance: awake, alert and adequately groomed, lying in bed but got up and came to interview room  Attitude:  guarded and somewhat cooperative  Eye Contact:  fair  Mood:  \"the same\" depressed  Affect:  mood congruent and intensity is flat  Speech:  minimal, soft volume, was clear and coherent when speaking  Language: fluent and intact in English  Psychomotor, Gait, Musculoskeletal: slowed;  no evidence of tardive dyskinesia, dystonia, or tics  Thought Process:  linear  Associations:  no loose associations  Thought Content:  no evidence of psychotic thought and active suicidal ideation present but without " plan while in the hospital without access to means to overdose  Insight:  limited  Judgement:  limited  Oriented to:  time, person, and place  Attention Span and Concentration:  intact  Recent and Remote Memory:  intact  Fund of Knowledge:  appropriate         Discharge Plan:   Patient to transfer via [facility] transport to [place] [address] under the care of Dr. Laughlin.      You are currently under a stay of commitment with [facility]  along with a [facility] order to take neuroleptic medication and a Robles Rs order to undergo forced ECT treatment. You have not been able to receive ECT treatment while on Station 30 due to the specific conditions of your court orders.     Health Care Follow-up Appointments:   [facility]- an appointment will need to be scheduled once close to discharge  Provider: Dr. Zhang Madrigal MD  Address: [address]  [address] [place] [address]  Phone:[phone]  Fax: [phone]     Continue to meet with your Mental Health , Samreen Machuca,[phone].       Continue to work with your [facility] CM, Patricia Hugo at [phone]. This person will assist you with managing outpatient appointments. They can assist with scheduling and follow up.      You were not scheduled any follow up appointments while on Station 30 because you were transferred to a different hospital within our medical system. You will be scheduled follow-up appointments once you are near discharge form [facility].     Attestation:  Patient has been seen and evaluated by me, Shari Mcfadden DO prior to transferring to [facility]. 120 minutes were spent in coordination of patients discharge/transfer.

## 2020-02-21 NOTE — PROGRESS NOTES
Patient discharging 2/21/2020 accompanied by 2 's and destination is to station 77 at [facility].    Discharge paperwork reviewed with Misty Parham who verbalizes understanding.     Patient has verbalized understanding of discharge instructions and medication administration.     Patient is transferring to station 77 at [facility] via . Patient denies suicidal ideation and self injurious thoughts right now, stating they come and go. Rates her depression 10 and anxiety a 5 (1 low-10 high). Denies auditory and visual hallucinations. Affect is flat and guarded. Adls are poor.     Copy of AVS reviewed and signed.      Locker items reviewed and signed for.      Survey provided

## 2020-02-21 NOTE — PROGRESS NOTES
02/21/20 1550   Patient Belongings   Did you bring any home meds/supplements to the hospital?  No   Patient Belongings locker   Patient Belongings Put in Hospital Secure Location (Security or Locker, etc.) other (see comments)   Belongings Search Yes   Clothing Search Yes   Second Staff Domenica corcoran with string  iphone in case  [place] 's license  Sports bra  Blue shirt  Socks   Pants         Admission:  I am responsible for any personal items that are not sent to the safe or pharmacy.  [facility] is not responsible for loss, theft or damage of any property in my possession.    Signature:  _________________________________ Date: _______  Time: _____                                              Staff Signature:  ____________________________ Date: ________  Time: _____      2nd Staff person, if patient is unable/unwilling to sign:    Signature: ________________________________ Date: ________  Time: _____     Discharge:  [facility] has returned all of my personal belongings:    Signature: _________________________________ Date: ________  Time: _____                                          Staff Signature:  ____________________________ Date: ________  Time: _____

## 2020-02-21 NOTE — DISCHARGE INSTRUCTIONS
Behavioral Discharge Planning and Instructions      Summary:  You were admitted on 2/18/2020  due to Depression, Anxiety, Borderline Personality Disorder, Suicidal Ideations and Suicide Attempt.  You were treated by Dr. Oleg Payton MD and Dr. Shari Mcfadden DO, and transferred on 2/21/20 from [facility] 30 to [facility].     You are currently under a stay of commitment with [facility]  along with a [facility] order to take neuroleptic medication and a Robles Sr order to undergo forced ECT treatment. You have not been able to receive ECT treatment while on Station 30 due to the specific conditions of your court orders.     Principal Diagnosis:   1.  Major depressive disorder, severe, with suicidal ideations.   2.  Borderline personality disorder.   3.  Anxiety, not otherwise specified.     Health Care Follow-up Appointments:   [facility]- an appointment will need to be scheduled once close to discharge  Provider: Dr. Zhang Madrigal MD  Address: [address] [place] [address]  [place] [address]  Phone:[phone]  Fax: [phone]    Continue to meet with your Mental Health , Samreen Machuca,[phone].      Continue to work with your [facility] CM, Patricia Hugo at [phone]. This person will assist you with managing outpatient appointments. They can assist with scheduling and follow up.     You were not scheduled any follow up appointments while on Station 30 because you were transferred to a different hospital within our medical system. You will be scheduled follow-up appointments once you are near discharge form [facility].   Major Treatments, Procedures and Findings:  You were provided with: a psychiatric assessment, assessed for medical stability and medication evaluation and/or management    Symptoms to Report: feeling more aggressive, increased confusion, losing more sleep, mood  getting worse or thoughts of suicide    Early warning signs can include: increased depression or anxiety sleep disturbances increased thoughts or behaviors of suicide or self-harm  increased unusual thinking, such as paranoia or hearing voices    Safety and Wellness:  Take all medicines as directed.  Make no changes unless your doctor suggests them.      Follow treatment recommendations.  Refrain from alcohol and non-prescribed drugs.  If there is a concern for safety, call 911.    Resources:     Your Blue Cross Blue Shield health insurance covers behavioral health case management services. You can contact [phone] for more information.       If you have questions about your medications and what is covered by your plan you can contact [employer] at [phone].     [facility]   Crisis services are available 24/7 if you are having a mental health crisis.    COPE (Community Outreach for Psychiatric Emergencies): [phone]    In the [facility], call **CRISIS (963829) from a cell phone to talk to a team of professionals who can help you.    Crisis Text Line: Text [place] to 356579    These are crisis residences where you can stay for a short time if you feel like you need to stabilize but do not need to go to the hospital.  [facility]  - Crisis Beds  1.[facility]  [address] [place] [address]  Phone:[phone]    2.[facility] Crisis Residence  [address] [place] [address]  Phone: [phone]    3.[facility] Crisis [facility]idence  [address] [place] [address]  Phone: [phone]    *If you need immediate counseling you can access the [facility]. Their mission is to provide free, easily accessible mental health counseling to people with urgent needs and few service options. The overarching goal is to help people stabilize during a time of crisis and resolve problems before they become severe.  Phone:  [phone]  Walk-In at [address] [place] [address]  M, W, F: 1:00PM-3:00PM  M, T, W, Th:  6:30PM-8:30PM    If you ever need immediate access to services, [facility] has a walk in triage services to help you with what you need. This Triage Center is located in Room N141 at [address] [place] [address]. Go through the front door of [address] [place] [address], and follow signs to N1.   Triage hours:10:00 am - 5:00 pm  Triage Phone Number: [phone]      The treatment team has appreciated the opportunity to work with you.     If you have any questions or concerns our unit number is [phone].

## 2020-02-21 NOTE — PROGRESS NOTES
WR received a phone call from Darek Robledo [phone],  for Pt. He requested pt contact him. WR relayed the message to pt.     WR attempted to contact Robbie De La Torre but was informed that he is out of the office until Tuesday. WR spoke with Lesley Feliciano, [facility] Attorney of the day, who advised WR that amending pt's commitment and lucas merino would take until sometime in next week. RADHA was informed by Dr. Mcfadden that a bed is available for a female at [facility] and relayed this to Lesley Feliciano. She advised transferring pt to [facility] in order to get appropriate treatment quicker since there was not a time frame for the price truong to get amended as of now. She instructed WR to contact [facility] transport line at [phone]. WR spoke to Deputy Giron. He received the court order from the [facility]  and provided the phone number for WR to contact [phone]. WR contacted the [facility]  to ask if  transport would be possible today and if an amendment would be needed in order for an ambulance transfer to [facility].  stated that the paperwork necessary for the 's was sent over to them early this afternoon to arrange transport. If the 's were not able to transport the pt the  confirmed that an ambulance transfer is acceptable and no court amendment would be needed. WR also spoke with Deputy Giron again and due to lack of available deputy's before 3pm today he recommended an ambulance transfer as well. He reiterated that it is not necessary for a court amendment in order to transfer via ambulance today. [facility] called for an ambulance transport, however WR was contacted by a different deputy shortly after stating that they in fact did have a deputy available to transport pt and they could arrive immediately. Pt was prepared for discharge to Station 77 under Dr. Laughlin.     RADHA contacted Station 77 by phone and was notified that the CTC to work with  pt is Lor Espino. RADHA composed an e-mail and left a voice message for Lor in an attempt to complete a warm hand off and discuss what options had been discussed for the pt. RADHA was contacted by Jennifer Zamarripa, another CTC on the unit. She was familiar with pt and informed RADHA that Lor will be out until Wednesday so Jennifer will be covering this case. RADHA provided information for handoff to Station 77.

## 2020-02-21 NOTE — PROGRESS NOTES
reviewed social history done on 02/18/2020 during patient's recent admission on Station 30 at [facility], and it remains relevant and up to date. Per a phone call from the clinical treatment coordinator at [facility] who was working with patient, patient was transferred back to [facility] today, 02/21/2020, so that she can possibly do ECT per the terms of the existing Robles Sr order. Patient is currently on a court hold. Case Management will remain available to assist with any discharge needs.

## 2020-02-22 PROCEDURE — 25000132 ZZH RX MED GY IP 250 OP 250 PS 637: Performed by: PSYCHIATRY & NEUROLOGY

## 2020-02-22 PROCEDURE — 93005 ELECTROCARDIOGRAM TRACING: CPT

## 2020-02-22 PROCEDURE — 99223 1ST HOSP IP/OBS HIGH 75: CPT | Mod: AI | Performed by: PSYCHIATRY & NEUROLOGY

## 2020-02-22 PROCEDURE — 93010 ELECTROCARDIOGRAM REPORT: CPT | Performed by: INTERNAL MEDICINE

## 2020-02-22 PROCEDURE — 12400000 ZZH R&B MH

## 2020-02-22 PROCEDURE — 99223 1ST HOSP IP/OBS HIGH 75: CPT | Performed by: HOSPITALIST

## 2020-02-22 RX ADMIN — BREXPIPRAZOLE 3 MG: 1 TABLET ORAL at 10:08

## 2020-02-22 RX ADMIN — VENLAFAXINE HYDROCHLORIDE 225 MG: 150 CAPSULE, EXTENDED RELEASE ORAL at 10:07

## 2020-02-22 RX ADMIN — LAMOTRIGINE 300 MG: 100 TABLET ORAL at 10:07

## 2020-02-22 RX ADMIN — MIRTAZAPINE 45 MG: 45 TABLET, FILM COATED ORAL at 21:18

## 2020-02-22 ASSESSMENT — ACTIVITIES OF DAILY LIVING (ADL)
ORAL_HYGIENE: INDEPENDENT;PROMPTS
LAUNDRY: WITH SUPERVISION
HYGIENE/GROOMING: INDEPENDENT;PROMPTS
DRESS: SCRUBS (BEHAVIORAL HEALTH)

## 2020-02-22 NOTE — PLAN OF CARE
Pt spent entire shift bed resting, napping, withdrawn and isolative. Affect appears depresses and is refusing to eat. Pt insight is poor and impaired judgement. During 1:1 Pt stated I am not going to eat and admits that Monday was the last time she had  Eaten. Pt was reminded of the importance of hydration to avoid any IV fluids and she said only the courts can force her to. Pt later agreed to drink 2 cups of OJ. Admits being dizzy when she get out of bed. Fall risk precaution implemented. Pt denies SI even though she admits that denying to eat was suicidal nor Hallucination. EKG done and patient is ready to start ECT.

## 2020-02-22 NOTE — H&P
"   IDENTIFICATION:  Ms. Parham is a 34-year-old,   woman living in [place] with her mother, stepfather, 22-year-old stepdaughter and 13-year-old son.  She also has a 16-year-old son, who lives with Ms. Parham's sister.  She has a history of depression, anxiety and borderline personality disorder.  She is followed by Dr. Giuseppe Madrigal.  She is currently committed with a Robles Sr in place.       She has  stay of commitment with [facility]  along with a [facility] order to take neuroleptic medication and a Robles Sr order to undergo forced ECT treatment. She has  not been able to receive ECT treatment  on [facility] [address] due to the specific conditions of her  court orders. So she was transferred here to [facility]      CC  slow painful death\"        HISTORY OF PRESENT ILLNESS: She has had 7 psychiatric admissions in the past 2 years at [facility] and [facility].  Ms. Parham has had depression since age 11.  She has  stay of commitment with [facility]  along with a [facility] order to take neuroleptic medication and a Robles Sr order to undergo forced ECT treatment. She has  not been able to receive ECT treatment  on [facility] [address] due to the specific conditions of her  court orders. So she was transferred here to [facility]   She endorsed s/o of anhedonia. Poor sleep.  She has not been eating anyhting, she wants to keep herself \"in pain\"         She also endorsed poor energy, motivation, concentration and focus.  She endorsed increase in ruminative thoughts.  She acknowledged that she continues to have suicidal ideations, but contracted for safety on the unit and did not report any intent.  She endorsed ongoing worthlessness, hopelessness and helplessness.  She expressed desire not to cooperate with care when I asked her is he will eat anything on the unit, she believes nothing will help her                Stressors included a 5-year separation from her  and not " being able to work due to her depression.  she cannot tell me any reason to live including her children , and her [employer], she believes her medication and ect will not work.  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  She has a plan to overdose, and plan not to eat and have a slow painful death       .  She was anhedonic, sleeping excessively, and had not eaten in days.  She had poor energy, amotivation, poor concentration and ruminative thoughts.  She felt worthless, hopeless and helpless.  She did not want to cooperate with care.    She acknowledged poor coping skills, low frustration tolerance, interpersonal conflicts with others.        She denied jey or psychosis.  She denied PTSD, OCD or eating disorder, although she stated that she binges at times and describes herself as an emotional eater.        .  She denied homicidal ideation.  She denied psychotic symptoms.  She denied panic attacks.      She does endorse generalized anxiety symptoms, including chronic excessive worry, muscle tension, restlessness, keyed-up feeling, poor concentration, fatigue, irritability (although she holds it in), and sleep disturbance.  She denies PTSD, obsessive-compulsive symptoms, health concerns, eating disorder or gambling.  Memory has been poor eg cannot report what we taked during the interview        PAST PSYCHIATRIC HISTORY:  Ms. Parham has had depression since age 11.  She has had 7 psychiatric admissions at [facility] and [facility] between 02/2019 and 02/2020.  Her outpatient psychiatrist is Dr. Madrigal, although records indicate she has also seen Drew Juarez, nurse practitioner at [facility].     She had most recently been at [facility] 11/25/2019 through 01/16/2020.  That admission was also prompted by depression and suicidal ideation.  During that admission, a petition for commitment was supported along with a court order  for Travis Sr.  She was treated with Rexulti and Trintellix and Remeron.  She was switched from Trintellix to Effexor XR.  She had 6 bilateral ECT treatments done at [facility], with improvement in mood.  She did not have any way to do outpatient ECT and has not had any ECT since.  She was treated with Lamictal, Effexor XR, Rexulti, Vistaril and Remeron.  This admission, she presented to [facility] Emergency Room.  She had been placed on a 72-hour hold by Dr. Madrigal and attempted to elope from his office and indeed went home.  Police found her and brought her back to the emergency room.  She has been treated for depression with therapy and medications since her 30s.  She has been on many psychotropic medications over the years, including but not limited to, hydroxyzine, Zoloft, Trintellix, Lamictal, trazodone, Remeron, Rexulti, Effexor XR and likely others.  She has no history of suicide attempts.  She has been through DBT twice.  She reports a history of suicidal gestures, but did not consider them suicide attempts.  She had ECT at [facility] with positive response but does not want to continue because of forgetfulness.  Psychiatrist is Dr. Madrigal.  She has no therapist.  She says her current medications are of some benefit.  Ms. Parham was also admitted to [facility] in 2015.     She was on a provisional discharge with a Bhargav and Travis Sr and commitment through [facility].  She has not been compliant with care.  She was refusing medication.  She was refusing ECT.  She had been referred to the day treatment program at [place] with a suggestion to consider TMS.  She did not adhere to any of those recommendations.  She reportedly made a suicide attempt by taking 3 tablets of hydroxyzine and an extra Remeron  CHEMICAL DEPENDENCY HISTORY:  Ms. Parham says she did not try alcohol until her 20s.  Alcohol was never a problem.  She does not drink.  She does  "not like it.  She denies blackouts, withdrawal symptoms, detox visits, DTs, seizures, DWIs or chemical dependency treatment.  She does not use drugs or tobacco.      PAST MEDICAL HISTORY:  Primary care physician is Monse Chen at [facility].  She had a cholecystectomy.  She denies head injuries or seizures.      Medical h/o     A 10-point review of systems is reviewed and is negative except for psychiatric symptoms above.    Blood pressure 120/82, pulse 80, temperature 98  F (36.7  C), temperature source Oral, resp. rate 16, height 1.651 m (5' 5\"), weight 100.5 kg (221 lb 8 oz), SpO2 99 %.  10 point ros of system done and is positive for psyhciatric issues only   ALLERGIES:  NO KNOWN DRUG ALLERGIES.      FAMILY HISTORY:  There is a history of alcoholism in mother and aunt.  She said her mother had some depression.  Father possibly had some mental illness and chemical dependence.  The alcoholic aunt also had depression.  She denies a family history of suicide.  Her oldest son has some mental health problems.      SOCIAL HISTORY:  Ms. Parham was born in the [place] and raised in [place] by her mother.  She has 2 sisters and a half-sister.  She has no brothers.  Parents  when she was poor.  She lives with her mother, but saw her father some too.  Father worked tool and die.  Mother had some sort of computer job.  She denied any history of physical, sexual or emotional abuse.  Ms. Parham graduated high school.  She  in 2001.  They have been  for 5 years, but her  is still a support.  She lives in [place] with her mother, ary, 22-year-old stepdaughter and son age 13.  She has a 16-year-old son who lives with Ms. Parham's sister.  Ms. Parham has a job at [employer] since 2001 but has not worked in several months due to her depression.  Her estranged  is a .  Her  is from [facility].  They have 2 biological children together.  She denies " "legal problems.  She was never in the .      MENTAL STATUS EXAMINATION:  Ms. Parham is a 34-year-old  woman looking somewhat older than her stated age.       slouched    Attitude:  cooperative somewhat   Eye Contact:  good  Mood:  depressed  Affect:  congruent flat affect, constricted   Speech:  clear, coherent low in volume and  Reduced  rate   Psychomotor Behavior: slowed in momemts    no evidence of tardive dyskinesia, dystonia, or tics  Thought Process: bradyphrenia, linear and goal oriented  Associations:  no loose associations  Thought Content:  no evidence of psychotic thought and  active suicidal ideation present plan to overdose   Insight:  poor  Judgment:  poor  Oriented to:  time, person, and place  Attention Span and Concentration:  intact  Recent and Remote Memory: adequate  Language:  english with appropriate syntax and vocabulary  Fund of Knowledge: appropriate  Muscle Strength and Tone: normal  Gait and Station: Normal   A 10-point review of systems is reviewed and is negative except for psychiatric symptoms above.       Blood pressure 120/82, pulse 80, temperature 98  F (36.7  C), temperature source Oral, resp. rate 16, height 1.651 m (5' 5\"), weight 100.5 kg (221 lb 8 oz), SpO2 99 %.            ASSESSMENT:  Ms. Parham is a 34-year-old woman with a long history of depression, anxiety and borderline personality disorder.  She has had many psychiatric admissions, mostly at [facility] and [facility].  She is currently committed with a Robles Sr order for electroconvulsive therapy      DIAGNOSES:   AXIS I:  Major depressive disorder, recurrent; generalized anxiety disorder.   AXIS II:  Borderline personality disorder.   AXIS III:  No diagnosis.    plan    stay of commitment with [facility]  along with a [facility] order to take neuroleptic medication and a Robles Sr order to undergo forced ECT treatment- she is still suicidal with plan to kill self either overdose " or not eat  Will order ect clearance   Will look at the price shappard order and order ect for monrichard bolaños was informed  Will order boost for pt   Will order nutritional consult   Was medicine recently at [place]    CONTINUE these medications which have NOT CHANGED     Details   brexpiprazole (REXULTI) 3 MG tablet Take 1 tablet (3 mg) by mouth daily  Qty: 30 tablet, Refills: 0           hydrOXYzine (ATARAX) 25 MG tablet Take 1-2 tablets (25-50 mg) by mouth 3 times daily as needed for anxiety  Qty: 30 tablet, Refills: 0            lamoTRIgine (LAMICTAL) 150 MG tablet Take 300 mg by mouth daily       mirtazapine (REMERON) 45 MG tablet Take 1 tablet (45 mg) by mouth At Bedtime  Qty: 30 tablet, Refills: 0           venlafaxine (EFFEXOR-XR) 75 MG 24 hr capsule Take 3 capsules (225 mg) by mouth daily (with breakfast)  Qty: 90 capsule, Refills: 0

## 2020-02-22 NOTE — H&P
[facility]    History and Physical - Hospitalist Service       Date of Admission:  2/21/2020    Assessment & Plan   Misty Parham is a 34 year old female with history of depression, anxiety, and borderline personality disorder who is admitted to the behavioral health unit.      Depression  Borderline personality disorder   [facility] order  Commitment   Per neuro notes and chart review, court ordered meds and ECT. Back to [facility]. Recent worsening of depression.  - EKG unremarkable. BMP yesterday WNL.   - defer cares to Psychiatry  - appears medically optimized for ECT      Diet: Regular Diet Adult  Snacks/Supplements Adult: Boost Plus; Between Meals    DVT Prophylaxis: Ambulate every shift  Allen Catheter: not present  Code Status: Full Code      Disposition Plan   Expected discharge: defer to primary - psychiatry team  Entered: Jeancarlos Gutierrez MD 02/22/2020, 11:31 AM     The patient's care was discussed with the Bedside Nurse and Patient.    Jeancarlos Gutierrez MD  [facility]    ______________________________________________________________________    Chief Complaint   Depression    History is obtained from chart review and some from patient    History of Present Illness   Misty Parham is a 34 year old female with history of depression and borderline personality disorder who has had a stay of commitment and [facility] order for antidepressant treatment as well as ECT.  Patient was at the [facility] apparently been transferred to [facility].  She endorses a worsening of her depression lately and it appears the plan is for ECT.  She has been refusing her medications lately.  She denies any other issues-no fevers, chills, coughing, shortness breath, chest pain, abdominal pain, nausea, vomiting, diarrhea or dysuria.  She cannot pinpoint any triggers specifically that may have worsened her depression.    Review of Systems    The 10 point Review of Systems is negative  other than noted in the HPI or here.     Past Medical History    I have reviewed this patient's medical history and updated it with pertinent information if needed.   Past Medical History:   Diagnosis Date     Depressive disorder        Past Surgical History   I have reviewed this patient's surgical history and updated it with pertinent information if needed.  Past Surgical History:   Procedure Laterality Date     CHOLECYSTECTOMY         Social History   I have reviewed this patient's social history and updated it with pertinent information if needed.  Social History     Tobacco Use     Smoking status: Never Smoker     Smokeless tobacco: Never Used   Substance Use Topics     Alcohol use: Not Currently     Frequency: Never     Drug use: Never       Family History   I have reviewed this patient's family history and updated it with pertinent information if needed.   Noncontributory    Prior to Admission Medications   Prior to Admission Medications   Prescriptions Last Dose Informant Patient Reported? Taking?   brexpiprazole (REXULTI) 3 MG tablet 2/21/2020 at 0846 Self No Yes   Sig: Take 1 tablet (3 mg) by mouth daily   hydrOXYzine (ATARAX) 25 MG tablet  Self No No   Sig: Take 1-2 tablets (25-50 mg) by mouth 3 times daily as needed for anxiety   lamoTRIgine (LAMICTAL) 150 MG tablet 2/21/2020 at 0846 Self Yes Yes   Sig: Take 300 mg by mouth daily   mirtazapine (REMERON) 45 MG tablet 2/20/2020 at 2131 Self No Yes   Sig: Take 1 tablet (45 mg) by mouth At Bedtime   venlafaxine (EFFEXOR-XR) 75 MG 24 hr capsule 2/21/2020 at 0846 Self No Yes   Sig: Take 3 capsules (225 mg) by mouth daily (with breakfast)      Facility-Administered Medications: None     Allergies   No Known Allergies    Physical Exam   Vital Signs: Temp: 98  F (36.7  C) Temp src: Oral BP: 120/82 Pulse: 80   Resp: 16 SpO2: 99 % O2 Device: None (Room air)    Weight: 221 lbs 8 oz      Gen: NAD, pleasant, quiet but responding appropriately  HEENT: Normocephalic,  EOMI, MMM  Resp: no crackles,  no wheezes, no increased work of resp  CV: S1S2 heard, reg rhythm, reg rate, no pedal edema  Abdo: soft, nontender, nondistended, bowel sounds present  Ext: calves nontender, well perfused  Neuro: AAOx3, CN grossly intact, no facial asymmetry      Data   Data reviewed today: I reviewed all medications, new labs and imaging results over the last 24 hours. I personally reviewed no images or EKG's today.    Recent Labs   Lab 02/21/20  1143 02/17/20 2022   WBC  --  9.2   HGB  --  12.7   MCV  --  91   PLT  --  314    139   POTASSIUM 3.9 3.8   CHLORIDE 107 107   CO2 24 25   BUN 23 9   CR 0.81 0.86   ANIONGAP 10 7   CRISTINE 9.3 9.1   GLC 68* 86   ALBUMIN  --  3.8   PROTTOTAL  --  7.6   BILITOTAL  --  0.4   ALKPHOS  --  78   ALT  --  15   AST  --  13     Most Recent 3 BMP's:  Recent Labs   Lab Test 02/21/20  1143 02/17/20 2022 11/26/19  0751    139 143   POTASSIUM 3.9 3.8 3.8   CHLORIDE 107 107 111*   CO2 24 25 28   BUN 23 9 13   CR 0.81 0.86 0.84   ANIONGAP 10 7 4   CRISTINE 9.3 9.1 8.8   GLC 68* 86 90

## 2020-02-23 PROCEDURE — 12400000 ZZH R&B MH

## 2020-02-23 PROCEDURE — 25000132 ZZH RX MED GY IP 250 OP 250 PS 637: Performed by: PSYCHIATRY & NEUROLOGY

## 2020-02-23 RX ORDER — KETOROLAC TROMETHAMINE 30 MG/ML
30 INJECTION, SOLUTION INTRAMUSCULAR; INTRAVENOUS
Status: CANCELLED
Start: 2020-02-23

## 2020-02-23 RX ADMIN — MIRTAZAPINE 45 MG: 45 TABLET, FILM COATED ORAL at 20:20

## 2020-02-23 RX ADMIN — BREXPIPRAZOLE 3 MG: 1 TABLET ORAL at 08:36

## 2020-02-23 RX ADMIN — VENLAFAXINE HYDROCHLORIDE 225 MG: 150 CAPSULE, EXTENDED RELEASE ORAL at 08:37

## 2020-02-23 RX ADMIN — LAMOTRIGINE 300 MG: 100 TABLET ORAL at 08:37

## 2020-02-23 NOTE — PLAN OF CARE
"When staff asked pt why she was not eating or drinking, pt stated, \"I want to die a slow and painful death\". Pt was informed that the  job of staff members is to care for the pt and that this would not be allowed to happen. Pt also stated that \"nothing ever gets better\", and that the ECT will only cassidy my brain\". Staff spent time with pt and encouraged her to eat and drink.   "

## 2020-02-23 NOTE — PLAN OF CARE
Patient isolative and withdrawn. Refusing food. Took small sips of water with morning medications.  Patient was transferred to [facility], where staff was able to get her to drink 600 of Gatorade and 300 of water.

## 2020-02-23 NOTE — PLAN OF CARE
Pt spent most of the shift lying in bed resting. Pt refused to eat dinner and only drank sips of gatorade with staff encouragement. Pt told staff she had drank her boost and some gatorade, however, Pt's roommate told staff she saw Pt taking the cups of boost and gatorade to the bathroom and heard her dropping them in the toilet. Staff asked Pt if she dumped the liquids in the toilet to which patient admitted she did. Estimate 120 ml this shift. Admits to feeling light-headed and was encouraged to drink fluids. Pt showered per her request. Pt presents as depressed, has a flat affect and will only speak with staff when engaged.

## 2020-02-24 ENCOUNTER — APPOINTMENT (OUTPATIENT)
Dept: SURGERY | Facility: CLINIC | Age: 35
End: 2020-02-24
Payer: COMMERCIAL

## 2020-02-24 ENCOUNTER — ANESTHESIA EVENT (OUTPATIENT)
Dept: SURGERY | Facility: CLINIC | Age: 35
End: 2020-02-24

## 2020-02-24 ENCOUNTER — ANESTHESIA (OUTPATIENT)
Dept: SURGERY | Facility: CLINIC | Age: 35
End: 2020-02-24

## 2020-02-24 LAB
ANION GAP SERPL CALCULATED.3IONS-SCNC: 7 MMOL/L (ref 3–14)
BUN SERPL-MCNC: 13 MG/DL (ref 7–30)
CALCIUM SERPL-MCNC: 9.1 MG/DL (ref 8.5–10.1)
CHLORIDE SERPL-SCNC: 104 MMOL/L (ref 94–109)
CO2 SERPL-SCNC: 26 MMOL/L (ref 20–32)
CREAT SERPL-MCNC: 0.87 MG/DL (ref 0.52–1.04)
GFR SERPL CREATININE-BSD FRML MDRD: 85 ML/MIN/{1.73_M2}
GLUCOSE SERPL-MCNC: 92 MG/DL (ref 70–99)
POTASSIUM SERPL-SCNC: 3.4 MMOL/L (ref 3.4–5.3)
SODIUM SERPL-SCNC: 137 MMOL/L (ref 133–144)

## 2020-02-24 PROCEDURE — 90870 ELECTROCONVULSIVE THERAPY: CPT

## 2020-02-24 PROCEDURE — 12400000 ZZH R&B MH

## 2020-02-24 PROCEDURE — 25000132 ZZH RX MED GY IP 250 OP 250 PS 637: Performed by: PSYCHIATRY & NEUROLOGY

## 2020-02-24 PROCEDURE — 36415 COLL VENOUS BLD VENIPUNCTURE: CPT | Performed by: PSYCHIATRY & NEUROLOGY

## 2020-02-24 PROCEDURE — 40000671 ZZH STATISTIC ANESTHESIA CASE

## 2020-02-24 PROCEDURE — 99232 SBSQ HOSP IP/OBS MODERATE 35: CPT | Mod: 25 | Performed by: PSYCHIATRY & NEUROLOGY

## 2020-02-24 PROCEDURE — 80048 BASIC METABOLIC PNL TOTAL CA: CPT | Performed by: PSYCHIATRY & NEUROLOGY

## 2020-02-24 PROCEDURE — 25800030 ZZH RX IP 258 OP 636: Performed by: ANESTHESIOLOGY

## 2020-02-24 PROCEDURE — 25000128 H RX IP 250 OP 636: Performed by: NURSE ANESTHETIST, CERTIFIED REGISTERED

## 2020-02-24 PROCEDURE — 25000566 ZZH SEVOFLURANE, EA 15 MIN

## 2020-02-24 PROCEDURE — 25000125 ZZHC RX 250: Performed by: NURSE ANESTHETIST, CERTIFIED REGISTERED

## 2020-02-24 RX ORDER — SODIUM CHLORIDE, SODIUM LACTATE, POTASSIUM CHLORIDE, CALCIUM CHLORIDE 600; 310; 30; 20 MG/100ML; MG/100ML; MG/100ML; MG/100ML
INJECTION, SOLUTION INTRAVENOUS CONTINUOUS
Status: DISCONTINUED | OUTPATIENT
Start: 2020-02-24 | End: 2020-03-02

## 2020-02-24 RX ADMIN — SUCCINYLCHOLINE CHLORIDE 100 MG: 20 INJECTION, SOLUTION INTRAMUSCULAR; INTRAVENOUS; PARENTERAL at 07:25

## 2020-02-24 RX ADMIN — VENLAFAXINE HYDROCHLORIDE 225 MG: 150 CAPSULE, EXTENDED RELEASE ORAL at 08:36

## 2020-02-24 RX ADMIN — HYDROXYZINE HYDROCHLORIDE 50 MG: 25 TABLET, FILM COATED ORAL at 19:25

## 2020-02-24 RX ADMIN — LAMOTRIGINE 300 MG: 100 TABLET ORAL at 08:36

## 2020-02-24 RX ADMIN — METHOHEXITAL SODIUM 100 MG: 500 INJECTION, POWDER, LYOPHILIZED, FOR SOLUTION INTRAMUSCULAR; INTRAVENOUS; RECTAL at 07:25

## 2020-02-24 RX ADMIN — MIRTAZAPINE 45 MG: 45 TABLET, FILM COATED ORAL at 21:13

## 2020-02-24 RX ADMIN — BREXPIPRAZOLE 3 MG: 1 TABLET ORAL at 08:36

## 2020-02-24 RX ADMIN — SODIUM CHLORIDE, POTASSIUM CHLORIDE, SODIUM LACTATE AND CALCIUM CHLORIDE: 600; 310; 30; 20 INJECTION, SOLUTION INTRAVENOUS at 06:14

## 2020-02-24 ASSESSMENT — ACTIVITIES OF DAILY LIVING (ADL)
HYGIENE/GROOMING: INDEPENDENT
ORAL_HYGIENE: INDEPENDENT
ORAL_HYGIENE: INDEPENDENT
DRESS: INDEPENDENT
LAUNDRY: WITH SUPERVISION
DRESS: INDEPENDENT
LAUNDRY: WITH SUPERVISION
HYGIENE/GROOMING: INDEPENDENT

## 2020-02-24 ASSESSMENT — LIFESTYLE VARIABLES: TOBACCO_USE: 0

## 2020-02-24 NOTE — ANESTHESIA PREPROCEDURE EVALUATION
"Anesthesia Pre-Procedure Evaluation    Patient: Misty Parham   MRN: [id number] : 1985          Preoperative Diagnosis: * No pre-op diagnosis entered *    * No procedures listed *    Past Medical History:   Diagnosis Date     Depressive disorder      Past Surgical History:   Procedure Laterality Date     CHOLECYSTECTOMY         Anesthesia Evaluation     . Pt has had prior anesthetic. Type: General    No history of anesthetic complications          ROS/MED HX    ENT/Pulmonary:      (-) tobacco use and sleep apnea   Neurologic:      (-) Delerium   Cardiovascular:     (+) ----. : . . . :. . Previous cardiac testing date:results:date: results:ECG reviewed date:19 results:NSR date: results:          METS/Exercise Tolerance:  >4 METS   Hematologic:         Musculoskeletal:         GI/Hepatic:        (-) GERD   Renal/Genitourinary:         Endo:     (+) Obesity (BMI 37), .      Psychiatric:     (+) psychiatric history (severe, requring ECT) depression      Infectious Disease:         Malignancy:         Other:    (+) No chance of pregnancy                         Physical Exam      Airway   Mallampati: III  TM distance: >3 FB  Neck ROM: full    Dental   Comment: Patient notes several \"cracked\" molars, denies loose teeth    Cardiovascular   Rhythm and rate: regular      Pulmonary    breath sounds clear to auscultation            Lab Results   Component Value Date    WBC 9.2 2020    HGB 12.7 2020    HCT 39.1 2020     2020     2020    POTASSIUM 3.9 2020    CHLORIDE 107 2020    CO2 24 2020    BUN 23 2020    CR 0.81 2020    GLC 68 (L) 2020    CRISTINE 9.3 2020    ALBUMIN 3.8 2020    PROTTOTAL 7.6 2020    ALT 15 2020    AST 13 2020    ALKPHOS 78 2020    BILITOTAL 0.4 2020    TSH 1.08 2020    HCG Negative 2020    HCGS Negative 2020       Preop Vitals  BP Readings from Last 3 " "Encounters:   02/24/20 107/75   02/21/20 120/88   02/18/20 114/83    Pulse Readings from Last 3 Encounters:   02/24/20 90   02/21/20 109   02/18/20 95      Resp Readings from Last 3 Encounters:   02/24/20 16   02/21/20 16   02/18/20 16    SpO2 Readings from Last 3 Encounters:   02/24/20 97%   02/21/20 97%   02/18/20 99%      Temp Readings from Last 1 Encounters:   02/24/20 36.6  C (97.8  F) (Temporal)    Ht Readings from Last 1 Encounters:   02/21/20 1.651 m (5' 5\")      Wt Readings from Last 1 Encounters:   02/21/20 100.5 kg (221 lb 8 oz)    Estimated body mass index is 36.86 kg/m  as calculated from the following:    Height as of this encounter: 1.651 m (5' 5\").    Weight as of this encounter: 100.5 kg (221 lb 8 oz).       Anesthesia Plan      History & Physical Review  History and physical reviewed and following examination; no interval change.    ASA Status:  2 .    NPO Status:  > 8 hours    Plan for General (Mask ventilation) with Intravenous induction.          Postoperative Care      Consents  Anesthetic plan, risks, benefits and alternatives discussed with:  Patient..                 Tucker Cornelius MD  "

## 2020-02-24 NOTE — PROGRESS NOTES
[facility]  Psychiatric Progress Note    Length of stay (days): 3        Interim History:   The patient's care was discussed with the treatment team during the daily team meeting and/or staff's chart notes were reviewed.  Staff report: The patient had been refusing food and fluids over the weekend.  She was moved to the [facility] unit for closer observation and has been more cooperative with food and fluid intake since then.  ECT was restarted this morning.    Depression severity scale 0-10 (10=most severe):  Today: 9    ECT was conducted this morning without complications.  She denied any discomfort post treatment.    Suicidal thoughts persist.  She reports ability to maintain safety in the hospital setting.  No homicidal thoughts reported.  No psychotic symptoms reported.    Energy is low, appetite is low, concentration is poor, anhedonia is moderate.    Tolerating medications without side effects.    She reviews with me ongoing psychosocial stressors: Relational difficulties with her , loss of autonomy and lack of independent housing, inability to function due to depressive symptoms and anticipates losing her job soon.           Medications:       brexpiprazole  3 mg Oral Daily     lamoTRIgine  300 mg Oral Daily     mirtazapine  45 mg Oral At Bedtime     sodium chloride (PF)  3 mL Intracatheter Q8H     venlafaxine  225 mg Oral Daily with breakfast          Allergies:   No Known Allergies       Labs:     Recent Results (from the past 24 hour(s))   Basic metabolic panel    Collection Time: 02/24/20  6:17 AM   Result Value Ref Range    Sodium 137 133 - 144 mmol/L    Potassium 3.4 3.4 - 5.3 mmol/L    Chloride 104 94 - 109 mmol/L    Carbon Dioxide 26 20 - 32 mmol/L    Anion Gap 7 3 - 14 mmol/L    Glucose 92 70 - 99 mg/dL    Urea Nitrogen 13 7 - 30 mg/dL    Creatinine 0.87 0.52 - 1.04 mg/dL    GFR Estimate 85 >60 mL/min/[1.73_m2]    GFR Estimate If Black >90 >60 mL/min/[1.73_m2]    Calcium 9.1 8.5 -  "10.1 mg/dL          Psychiatric Examination:     /81   Pulse 90   Temp 97.9  F (36.6  C) (Oral)   Resp 16   Ht 1.651 m (5' 5\")   Wt 100.5 kg (221 lb 8 oz)   SpO2 98%   BMI 36.86 kg/m    Weight is 221 lbs 8 oz  Body mass index is 36.86 kg/m .  Orthostatic Vitals     None            Appearance: awake, alert  Attitude:  cooperative  Eye Contact:  fair  Mood:  depressed  Affect:  intensity is blunted  Speech:  decreased prosody  Psychomotor Behavior:  no evidence of tardive dyskinesia, dystonia, or tics  Throught Process:  linear  Associations:  no loose associations  Thought Content:  no evidence of psychotic thought and active suicidal ideation present  Insight:  partial  Judgement:  fair  Oriented to:  time, person, and place  Attention Span and Concentration:  fair  Recent and Remote Memory:  intact    Clinical Global Impressions  First:     Most recent:            Precautions:     Behavioral Orders   Procedures     Code 1 - Restrict to Unit     Code 2 - 1:1 Staff Supervision     For ECT only     Electroconvulsive therapy     Electroconvulsive therapy     2 treatments per week for duration of comittment under price truong order. Begin Date: 2/24/20    Treating Psychiatrist providing ECT:  Dr Devi     Notified on:  2/24/20     Fall precautions     Routine Programming     As clinically indicated     Status 15     Every 15 minutes.          DIagnoses:     Major depressive disorder-recurrent, severe  Generalized anxiety disorder  Unspecified personality disorder         Plan:     Her prior effective medications have been resumed which include a combination of Effexor and Remeron to address her depressive and anxiety disorders.  Rexulti has been resumed for augmentation of her antidepressants.  Lamotrigine has been resumed for management of affective instability.    Treatment with ECT has been resumed while under a [facility].  Treatment will be continued twice a week, Monday and Wednesday of " this week, then changed to Monday and Friday beginning next week.     Psychosocial treatments to be addressed with social work consult and groups.  Consider neuropsychological testing to explore underlying personality characteristics that could be complicating her treatment course.    Legal Status: full commitment with [facility]    Disposition: To be determined.  Ongoing psychosocial stressors continue to be a barrier to full recovery from her illness.  The most ideal disposition would be to an intensive residential treatment facility.

## 2020-02-24 NOTE — PLAN OF CARE
BEHAVIORAL TEAM DISCUSSION    Participants: MD, RN, CM, PA, OT   Progress: Improving  Anticipated length of stay: Unknown. Patient under commitment in [facility], and her provisional discharge was revoked.   Continued Stay Criteria/Rationale: Undergo maintenance ECT per terms of the [facility] to attain psychiatric stabilization  Medical/Physical: Per hospitalist consult  Precautions:   Behavioral Orders   Procedures     Code 1 - Restrict to Unit     Code 2 - 1:1 Staff Supervision     For ECT only     Electroconvulsive therapy     Electroconvulsive therapy     2 treatments per week for duration of comittment under price truong order. Begin Date: 2/24/20    Treating Psychiatrist providing ECT:  Dr Devi     Notified on:  2/24/20     Fall precautions     Routine Programming     As clinically indicated     Status 15     Every 15 minutes.     Plan: Her prior effective medications have been resumed which include a combination of Effexor and Remeron to address her depressive and anxiety disorders.  Rexulti has been resumed for augmentation of her antidepressants.  Lamotrigine has been resumed for management of affective instability.     Treatment with ECT has been resumed while under a [facility].  Treatment will be continued twice a week, Monday and Wednesday of this week, then changed to Monday and Friday beginning next week.      Psychosocial treatments to be addressed with social work consult and groups.  Consider neuropsychological testing to explore underlying personality characteristics that could be complicating her treatment course.    Legal Status: full commitment with [facility]    Disposition: To be determined.  Ongoing psychosocial stressors continue to be a barrier to full recovery from her illness.  The most ideal disposition would be to an intensive residential treatment facility.    Rationale for change in precautions or plan: Psychiatric stabilization.

## 2020-02-24 NOTE — ANESTHESIA CARE TRANSFER NOTE
Patient: Misty Parham    * No procedures listed *    Diagnosis: * No pre-op diagnosis entered *  Diagnosis Additional Information: No value filed.    Anesthesia Type:   General     Note:  Airway :Nasal Cannula  Patient transferred to:PACU  Comments: Native airway general anesthetic.  Patient hyperventilated with 100% oxygen via mask prior to treatment.   Anesthesia induced using patent peripheral IV.    Bite block placed between molars to protect teeth and tongue.     After induction of seizure patient mask ventilated with 100% oxygen until spontaneous respirations returned.     At time of handoff to PACU, patient exhibited spontaneous respirations, adequate tidal volumes, airway patent. Oxygen via nasal cannula at 4 liters per minute to PACU in cart with siderails up, connected to wall O2 in PACU. All monitors and alarms on and functioning. Report given to PACU RN and questions answered. Handoff Report: Identifed the Patient, Identified the Reponsible Provider, Reviewed the pertinent medical history, Discussed the surgical course, Reviewed Intra-OP anesthesia mangement and issues during anesthesia, Set expectations for post-procedure period and Allowed opportunity for questions and acknowledgement of understanding      Vitals: (Last set prior to Anesthesia Care Transfer)    CRNA VITALS  2/24/2020 0703 - 2/24/2020 0733      2/24/2020             NIBP:  (!) 139/93    Pulse:  108    NIBP Mean:  117    SpO2:  100 %    Resp Rate (observed):  19    Resp Rate (set):  10                Electronically Signed By: Christine Marie Volp Hodgkins, CRNA, APRN CRNA  February 24, 2020  7:33 AM

## 2020-02-24 NOTE — ANESTHESIA POSTPROCEDURE EVALUATION
Patient: Misty Parham    ECT    Diagnosis:* Major Depressive Disorder  Diagnosis Additional Information: No value filed.    Anesthesia Type:  General    Note:  Anesthesia Post Evaluation    Patient location during evaluation: PACU  Patient participation: Able to fully participate in evaluation  Level of consciousness: awake  Pain management: adequate  Airway patency: patent  Cardiovascular status: acceptable  Respiratory status: acceptable  Hydration status: acceptable  PONV: none             Last vitals:  Vitals:    02/24/20 0522 02/24/20 0604 02/24/20 0609   BP: 107/75     Pulse: 85 90    Resp: 16 16    Temp: 36.6  C (97.8  F) 36.6  C (97.8  F)    SpO2: 97% 97% 97%         Electronically Signed By: Tucker Cornelius MD  February 24, 2020  7:35 AM

## 2020-02-24 NOTE — PLAN OF CARE
Shift Update: Pt mood is depressed and anxious; affect is sad. Thought process is impaired and insight is poor. Pt endorses chronic thoughts of suicidal ideation. Pt states she would overdose, but does contracts for safety at hospital. Pt was isolative and withdrawn much of shift, but did come into lounge and watch TV with peers for a short while. Pt declined all food, but did drink broth, ginger ale, soda, and water for a total of 807 ml   ECT# 1 on 2/24.

## 2020-02-24 NOTE — PROCEDURES
[facility] ECT Procedure Note     Misty Parham [id number]   34 year old 1985     Patient Status: Inpatient    No Known Allergies    Weight:  221 lbs 8 oz    Patient Preparations: Glasses/Contacts removed         Diagnosis:   Major depression     Medications ineffective, Deteriorating fluid/electrolyte/nutritional status, History of good ECT response in one or more previous episodes of illness and currently receiving maintenance  ECT per lucas truong order 2X per week for duration of committment       Pause for the Cause:     Right patient Yes   Right procedure/laterality settings: Yes   Right diagnosis Yes          Intra-Procedure Documentation:     Date:  2/24/2020  Time:  7:31 AM    ECT #    Treatment number this series: 1   Total treatment number: 1   Type of ECT:  Bilateral, standard    ECT Medications administered: Brevital: 100mg  Succinyl Choline: 100mg         Clinical Narrative:     ECT was administered by Thymatron machine.  Pt has been medically cleared for procedure, consent not needed as ECT tx is being done under court order. Side effects, Risks and benefits reviewed. Misty is IP, receiving ECT under price truong order, 2 ECT tx per week for duration of committment M/W schedule  ECT Strip Summary:   Energy Level: 50 percent  Motor Seizure Duration: 32 seconds  EEG Seizure Duration: 32 seconds    Complications: No    Plan: second maintenance ECT 2/26/20 per price truong order for duration of comittment    Doug Devi MD

## 2020-02-24 NOTE — PROGRESS NOTES
received a call this morning from patient's court appointed [facility] , Samreen Machuca ([phone]). She was able to provide some collateral information on patient during the time she was home. She stated that patient appears to have been inconsistent in taking her prescribed medications. She stated that it is possible that patient never picked up her medications from the pharmacy. She stated that patient did do the intake for the [facility] intensive outpatient program, but she did not go to any of the sessions. Apparently the copay was $35 per session, so she could not afford it. In addition, she was unable to pay for her car tabs to be renewed, so she would have been unable to drive herself there. As far as doing the maintenance ECT as an outpatient, she again did not have transportation. It was noted that she had poor support from her family in getting to her ECT appointments. Patient's [facility]  stated that she thinks if patient does not return to work by this Thursday, 02/27/2020, that she will be terminated from her job. If that is the case, then she would in turn lose her insurance. She stated that patient last saw her outpatient psychiatrist, Dr. Zhang Madrigal at [facility], on Monday 02/27/2020. He had wanted to put her on a hold and have her brought to the hospital for ECT. However she took off from the hospital and was later brought to [facility] by the police.     Patient's [facility]  stated that a typical day for patient at home was very isolating. She would get up in the morning to help get her child off to the school bus, and then she would return to bed where she stayed much of the day. Patient's [facility]  is concerned about patient returning home if nothing changes. She feels that transportation for outpatient ECT will continue to be a barrier. If patient were to lose her job and insurance, patient would need  to apply for medical assistance. If patient were on medical assistance, she then might be able to have a referral made for an IRTS. She feels that an IRTS might be beneficial to patient in the long term. She also feels that patient would be eligible for disability as well. If patient were to be referred to an IRTS, she feels that [place], [facility] or [facility] might be the best options for her.  reviewed that patient was seen by an on-call psychiatrist this weekend, and will be seeing Dr. Laughlin today. She is aware that patient had one maintenance ECT today.  will call and update the [facility]  with the treatment plan once Dr. Laughlin has had the opportunity to meet with patient and decide what the treatment plan will be.

## 2020-02-24 NOTE — PLAN OF CARE
Problem: Depressive Symptoms  Goal: Depressive Symptoms  Description  Signs and symptoms of listed problems will be absent or manageable.  Flowsheets (Taken 2/24/2020 1500)  Depressive Symptoms Assessed: all  Depressive Symptoms Present: suicidality;affect;mood;anxiety;insight;thought process;other (see comment)  Note:   Patient's vitals were WNL. Patient was medication compliant however Patient took no psychiatric PRNs. Patient showered. Patient appears depressed and anxious. Patient endorses suicidal ideation and refused meals and did not consumed any fluids stating that she wants to die. Patient was isolative to her room. Patient slept for most of the shift.

## 2020-02-24 NOTE — PROGRESS NOTES
Pt spoke with staff and stated she is restricting her food and intake. Pt was encouraged to eat and drink but pt stated she wants to have a slow and painful death. Nursing tried to reframe her thinking and help her focus more positively but pt remains focused on wanting to die. Continue monitor I/O. Dr. Laughlin notified. Nursing to continue to monitor.

## 2020-02-25 LAB — INTERPRETATION ECG - MUSE: NORMAL

## 2020-02-25 PROCEDURE — 25000132 ZZH RX MED GY IP 250 OP 250 PS 637: Performed by: PSYCHIATRY & NEUROLOGY

## 2020-02-25 PROCEDURE — 12400000 ZZH R&B MH

## 2020-02-25 PROCEDURE — 99232 SBSQ HOSP IP/OBS MODERATE 35: CPT | Performed by: PSYCHIATRY & NEUROLOGY

## 2020-02-25 RX ADMIN — LAMOTRIGINE 300 MG: 100 TABLET ORAL at 08:30

## 2020-02-25 RX ADMIN — MIRTAZAPINE 45 MG: 45 TABLET, FILM COATED ORAL at 21:54

## 2020-02-25 RX ADMIN — BREXPIPRAZOLE 3 MG: 1 TABLET ORAL at 08:29

## 2020-02-25 RX ADMIN — VENLAFAXINE HYDROCHLORIDE 225 MG: 150 CAPSULE, EXTENDED RELEASE ORAL at 08:30

## 2020-02-25 ASSESSMENT — ACTIVITIES OF DAILY LIVING (ADL)
DRESS: INDEPENDENT
HYGIENE/GROOMING: INDEPENDENT
DRESS: INDEPENDENT
ORAL_HYGIENE: INDEPENDENT
ORAL_HYGIENE: INDEPENDENT
LAUNDRY: WITH SUPERVISION
HYGIENE/GROOMING: INDEPENDENT

## 2020-02-25 NOTE — PROGRESS NOTES
"1:1 with Pt. Pt is actively suicidal Pt agreed not to hurt herself while on the unit, with the exception of not eating. Pt said, \"Just let me go home, I'm tired of this and I just want to die.Let me go home and I will overdose.\"  Pt also said, \"I could hurt myself, it would be slow and painful but my kids wouldn't know I killed myself, They would just find out my heart or liver or kidneys stopped working.\" Frequent checks on pt. Pt encouraged to come out to the lounge, talk with staff as needed. Offered a Alexandrea to talk with pt. Pt refused.  "

## 2020-02-25 NOTE — PROGRESS NOTES
02/25/20 1400   General Information   Has Not Attended OT as of: 02/25/20     Pt has not attended OT since admit.  Will continue to encourage participation and completion of  self-assessment as able. OT staff will explain the purpose of being involved with treatment plan and provide options to meet current needs and goals.

## 2020-02-25 NOTE — PLAN OF CARE
"  Problem: Depressive Symptoms  Goal: Depressive Symptoms  Description  Signs and symptoms of listed problems will be absent or manageable.  Outcome: No Change  Flowsheets (Taken 2/25/2020 132)  Depressive Symptoms Assessed: all  Depressive Symptoms Present: thought process; insight; other (see comment); suicidality  Note:   Pt presents with a sad affect and depressed mood. Her insight is poor and judgement highly impaired - pt continues to endorse active SI, stating that she plans to die and will take her life in a painful way because she \"deserves it.\" Thought processes are fixed on feeling depressed and wanting to die. She spent some time watching TV in the [facility] lounge and engaged in conversation with staff but with minimal insight. Declines to participate in activities of any sort. Plan is to start again with ECT. Denies hallucinations.     "

## 2020-02-25 NOTE — PLAN OF CARE
Shift Update: Pt mood is calm - depressed, affect is sad. Thought process is impaired. Insight is poor. Pt admits to having suicidal ideation with no plans. Pt spent the entire shift resting in her room. Refused to eat dinner. Pt spoke minimally during 1:1.

## 2020-02-25 NOTE — PROGRESS NOTES
Left a voice mail for patient's , Samreen Machuca [phone] to let her know patient will have ECT through next Friday for sure and that Dr. Laughlin was in favor of an IRTS placement.  I let her know that we have not discussed an IRTS placement with patient yet, so not sure if patient is agreeable to this plan.  Although an obstacle to this plan and IRTS placement is that patient has private insurance at this time.

## 2020-02-26 ENCOUNTER — ANESTHESIA EVENT (OUTPATIENT)
Dept: SURGERY | Facility: CLINIC | Age: 35
End: 2020-02-26

## 2020-02-26 ENCOUNTER — APPOINTMENT (OUTPATIENT)
Dept: SURGERY | Facility: CLINIC | Age: 35
End: 2020-02-26
Payer: COMMERCIAL

## 2020-02-26 ENCOUNTER — ANESTHESIA (OUTPATIENT)
Dept: SURGERY | Facility: CLINIC | Age: 35
End: 2020-02-26

## 2020-02-26 PROCEDURE — 99232 SBSQ HOSP IP/OBS MODERATE 35: CPT | Mod: 25 | Performed by: PSYCHIATRY & NEUROLOGY

## 2020-02-26 PROCEDURE — 90870 ELECTROCONVULSIVE THERAPY: CPT

## 2020-02-26 PROCEDURE — 25000128 H RX IP 250 OP 636: Performed by: NURSE ANESTHETIST, CERTIFIED REGISTERED

## 2020-02-26 PROCEDURE — 25000132 ZZH RX MED GY IP 250 OP 250 PS 637: Performed by: PSYCHIATRY & NEUROLOGY

## 2020-02-26 PROCEDURE — 25000125 ZZHC RX 250: Performed by: NURSE ANESTHETIST, CERTIFIED REGISTERED

## 2020-02-26 PROCEDURE — 25800030 ZZH RX IP 258 OP 636: Performed by: ANESTHESIOLOGY

## 2020-02-26 PROCEDURE — 40000671 ZZH STATISTIC ANESTHESIA CASE

## 2020-02-26 PROCEDURE — 12400000 ZZH R&B MH

## 2020-02-26 RX ADMIN — VENLAFAXINE HYDROCHLORIDE 225 MG: 150 CAPSULE, EXTENDED RELEASE ORAL at 08:45

## 2020-02-26 RX ADMIN — MIRTAZAPINE 45 MG: 45 TABLET, FILM COATED ORAL at 21:29

## 2020-02-26 RX ADMIN — LAMOTRIGINE 300 MG: 100 TABLET ORAL at 08:46

## 2020-02-26 RX ADMIN — SODIUM CHLORIDE, POTASSIUM CHLORIDE, SODIUM LACTATE AND CALCIUM CHLORIDE: 600; 310; 30; 20 INJECTION, SOLUTION INTRAVENOUS at 07:08

## 2020-02-26 RX ADMIN — SODIUM CHLORIDE, POTASSIUM CHLORIDE, SODIUM LACTATE AND CALCIUM CHLORIDE: 600; 310; 30; 20 INJECTION, SOLUTION INTRAVENOUS at 05:59

## 2020-02-26 RX ADMIN — SUCCINYLCHOLINE CHLORIDE 100 MG: 20 INJECTION, SOLUTION INTRAMUSCULAR; INTRAVENOUS; PARENTERAL at 07:12

## 2020-02-26 RX ADMIN — METHOHEXITAL SODIUM 100 MG: 500 INJECTION, POWDER, LYOPHILIZED, FOR SOLUTION INTRAMUSCULAR; INTRAVENOUS; RECTAL at 07:10

## 2020-02-26 RX ADMIN — BREXPIPRAZOLE 3 MG: 1 TABLET ORAL at 08:47

## 2020-02-26 ASSESSMENT — ACTIVITIES OF DAILY LIVING (ADL)
LAUNDRY: WITH SUPERVISION
HYGIENE/GROOMING: INDEPENDENT
ORAL_HYGIENE: INDEPENDENT
DRESS: INDEPENDENT
DRESS: INDEPENDENT
ORAL_HYGIENE: INDEPENDENT
HYGIENE/GROOMING: INDEPENDENT

## 2020-02-26 ASSESSMENT — LIFESTYLE VARIABLES: TOBACCO_USE: 0

## 2020-02-26 NOTE — PLAN OF CARE
"  Problem: Depressive Symptoms  Goal: Depressive Symptoms  Description  Signs and symptoms of listed problems will be absent or manageable.  2/26/2020 1451 by Ivana Serrano  Outcome: No Change  Flowsheets  Taken 2/25/2020 1329 by Domenica Grimes  Depressive Symptoms Assessed: all  Taken 2/26/2020 1355 by Ivana Serrano  Depressive Symptoms Present: suicidality;affect;mood;insight;thought process  Note:   Pt presents with a sad affect and depressed mood. Pt thought process and insight is poor. Pt took a shower after coming back from ECT this morning. Pt spent the rest of the shift bed resting or napping. Pt refuses to eat or drink, states \"I want to die\". Nurse went through suicide assessment with pt, see flowsheets for details.     "

## 2020-02-26 NOTE — PROCEDURES
[facility] ECT Procedure Note     Misty Parham [id number]   34 year old 1985     Patient Status: Inpatient    No Known Allergies    Weight:  221 lbs 8 oz    Patient Preparations: Glasses/Contacts removed         Diagnosis:   Major depression     Medications ineffective, Deteriorating fluid/electrolyte/nutritional status, History of good ECT response in one or more previous episodes of illness and currently receiving maintenance  ECT per lucas truong order 2X per week for duration of committment       Pause for the Cause:     Right patient Yes   Right procedure/laterality settings: Yes   Right diagnosis Yes          Intra-Procedure Documentation:     Date:  2/26/2020  Time:  7:10 AM    ECT #    Treatment number this series: 2   Total treatment number: 2   Type of ECT:  Bilateral, standard    ECT Medications administered: Brevital: 100mg  Succinyl Choline: 100mg         Clinical Narrative:     ECT was administered by Thymatron machine.  Pt has been medically cleared for procedure, consent not needed as ECT tx is being done under court order. Side effects, Risks and benefits reviewed. Misty is IP, receiving ECT under price truong order, 2 maintainence ECT tx per week for duration of committment M/W schedule  ECT Strip Summary:   Energy Level: 55 percent  Motor Seizure Duration: 32 seconds  EEG Seizure Duration:  45 seconds    Complications: No    Plan: second maintenance ECT 3/2/20 per price truong order for duration of comittment    Doug Devi MD

## 2020-02-26 NOTE — PLAN OF CARE
Pt has been mainly bed resting and isolative in her room throughout the evening shift. Has come out of her room spontaneously; sitting in the lounge. Pt presents a flat, depressed affect; mood is calm. Pt has suicidal thoughts that are active and with a plan. Respectful to peers and staff; keeps to herself. No shower; attire is appropriate to age and situation. No groups.

## 2020-02-26 NOTE — ANESTHESIA PREPROCEDURE EVALUATION
Anesthesia Pre-Procedure Evaluation    Patient: Misty Parham   MRN: [id number] : 1985          Preoperative Diagnosis: * No pre-op diagnosis entered *    * No procedures listed *    Past Medical History:   Diagnosis Date     Depressive disorder      Past Surgical History:   Procedure Laterality Date     CHOLECYSTECTOMY         Anesthesia Evaluation     . Pt has had prior anesthetic. Type: General    No history of anesthetic complications          ROS/MED HX    ENT/Pulmonary:      (-) tobacco use and sleep apnea   Neurologic:      (-) Delerium   Cardiovascular:     (+) ----. : . . . :. . Previous cardiac testing date:results:date: results:ECG reviewed date:2020 results:NSR date: results:          METS/Exercise Tolerance:  >4 METS   Hematologic:         Musculoskeletal:         GI/Hepatic:        (-) GERD   Renal/Genitourinary:         Endo:     (+) Obesity (BMI 37), .      Psychiatric:     (+) psychiatric history (severe, requring ECT) depression      Infectious Disease:         Malignancy:         Other:    (+) No chance of pregnancy                         Physical Exam  Normal systems: dental    Airway   Mallampati: III  TM distance: >3 FB  Neck ROM: full  Comment: Poor effort    Dental     Cardiovascular   Rhythm and rate: regular      Pulmonary    breath sounds clear to auscultation            Lab Results   Component Value Date    WBC 9.2 2020    HGB 12.7 2020    HCT 39.1 2020     2020     2020    POTASSIUM 3.4 2020    CHLORIDE 104 2020    CO2 26 2020    BUN 13 2020    CR 0.87 2020    GLC 92 2020    CRISTINE 9.1 2020    ALBUMIN 3.8 2020    PROTTOTAL 7.6 2020    ALT 15 2020    AST 13 2020    ALKPHOS 78 2020    BILITOTAL 0.4 2020    TSH 1.08 2020    HCG Negative 2020    HCGS Negative 2020       Preop Vitals  BP Readings from Last 3 Encounters:   20 123/83   20  "120/88   02/18/20 114/83    Pulse Readings from Last 3 Encounters:   02/26/20 106   02/21/20 109   02/18/20 95      Resp Readings from Last 3 Encounters:   02/26/20 16   02/21/20 16   02/18/20 16    SpO2 Readings from Last 3 Encounters:   02/26/20 98%   02/21/20 97%   02/18/20 99%      Temp Readings from Last 1 Encounters:   02/26/20 36.2  C (97.1  F) (Temporal)    Ht Readings from Last 1 Encounters:   02/21/20 1.651 m (5' 5\")      Wt Readings from Last 1 Encounters:   02/21/20 100.5 kg (221 lb 8 oz)    Estimated body mass index is 36.86 kg/m  as calculated from the following:    Height as of this encounter: 1.651 m (5' 5\").    Weight as of this encounter: 100.5 kg (221 lb 8 oz).       Anesthesia Plan      History & Physical Review  History and physical reviewed and following examination; no interval change.    ASA Status:  2 .    NPO Status:  > 8 hours    Plan for General (Mask ventilation) with Intravenous induction.          Postoperative Care      Consents  Anesthetic plan, risks, benefits and alternatives discussed with:  Patient..                 Pedro Finley MD  "

## 2020-02-26 NOTE — ANESTHESIA POSTPROCEDURE EVALUATION
Patient: Misty Parham    * No procedures listed *    Diagnosis:* No pre-op diagnosis entered *  Diagnosis Additional Information: No value filed.    Anesthesia Type:  General    Note:  Anesthesia Post Evaluation    Patient location during evaluation: Bedside  Patient participation: Able to fully participate in evaluation  Level of consciousness: awake and alert  Pain management: adequate  Airway patency: patent  Cardiovascular status: acceptable  Respiratory status: acceptable  Hydration status: acceptable  PONV: none             Last vitals:  Vitals:    02/26/20 0735 02/26/20 0740 02/26/20 0745   BP: 117/80 121/72 119/74   Pulse: 90 89 89   Resp: 14 14 14   Temp: 37.4  C (99.4  F)     SpO2: 95% 94% 96%         Electronically Signed By: Pedro Finley MD  February 26, 2020  8:26 AM

## 2020-02-26 NOTE — PROGRESS NOTES
"[facility]  Psychiatric Progress Note    Length of stay (days): 4        Interim History:   The patient's care was discussed with the treatment team during the daily team meeting and/or staff's chart notes were reviewed.  Staff report: The patient had been refusing food and fluids.  Excepting medications with a small amount of water.    Depression severity scale 0-10 (10=most severe):  Today: 10    She continues to report severe depressed mood.  She verbalized suicidal thoughts today \"I am going to die a slow and painful death.  They might take a few months or even a few years but it will happen.  I will make sure that it happens.  I will make sure that it is painful.\"    She is clearly verbalizing feelings of helplessness and hopelessness.  She reports ability to maintain safety in the hospital setting.     No homicidal thoughts reported.  No psychotic symptoms reported.    Energy is low, appetite is low, concentration is poor, anhedonia is moderate.    Tolerating medications without side effects.    She again reviewed with me feeling as though her  does not care.  She has noted that he has more involved when she is in the hospital however quickly becomes distant once she is discharged.  She questions whether his current involvement is genuine.  She does not feel well supported by her mother.  In summary, she feels abandoned by all of her family members.    She reports minimal hope in recovery if being discharged back to the same living environment.  Despite this concern, she reports little interest in residential treatment at this time.  This seems to reflect her feeling of hopelessness.           Medications:       brexpiprazole  3 mg Oral Daily     lamoTRIgine  300 mg Oral Daily     mirtazapine  45 mg Oral At Bedtime     venlafaxine  225 mg Oral Daily with breakfast          Allergies:   No Known Allergies       Labs:     No results found for this or any previous visit (from the past 24 " "hour(s)).       Psychiatric Examination:     /79   Pulse 90   Temp 98.3  F (36.8  C) (Oral)   Resp 16   Ht 1.651 m (5' 5\")   Wt 100.5 kg (221 lb 8 oz)   SpO2 99%   BMI 36.86 kg/m    Weight is 221 lbs 8 oz  Body mass index is 36.86 kg/m .  Orthostatic Vitals     None            Appearance: awake, alert  Attitude:  cooperative  Eye Contact:  fair  Mood:  depressed  Affect:  intensity is blunted  Speech:  decreased prosody  Psychomotor Behavior:  no evidence of tardive dyskinesia, dystonia, or tics  Throught Process:  linear  Associations:  no loose associations  Thought Content:  no evidence of psychotic thought and active suicidal ideation present  Insight:  partial  Judgement:  fair  Oriented to:  time, person, and place  Attention Span and Concentration:  fair  Recent and Remote Memory:  intact    Clinical Global Impressions  First:     Most recent:            Precautions:     Behavioral Orders   Procedures     Code 1 - Restrict to Unit     Code 2 - 1:1 Staff Supervision     For ECT only     Code 2 - 1:1 Staff Supervision     For ECT only     Fall precautions     Fall precautions     Routine Programming     As clinically indicated     Status 15     Every 15 minutes.          DIagnoses:     Major depressive disorder-recurrent, severe  Generalized anxiety disorder  Unspecified personality disorder         Plan:     Her prior effective medications have been resumed which include a combination of Effexor and Remeron to address her depressive and anxiety disorders.  Rexulti has been resumed for augmentation of her antidepressants.  Lamotrigine has been resumed for management of affective instability.    Treatment with ECT has been resumed while under a [facility].  Treatment will be continued twice a week, Monday and Wednesday of this week, then changed to Monday and Friday beginning next week.     Psychosocial treatments to be addressed with social work consult and groups.  Consider neuropsychological " testing to explore underlying personality characteristics that could be complicating her treatment course.  Brief supportive psychotherapy was provided today.    Legal Status: full commitment with [facility]en    Disposition: To be determined.  Ongoing psychosocial stressors continue to be a barrier to full recovery from her illness.  The most ideal disposition would be to an intensive residential treatment facility.

## 2020-02-27 PROCEDURE — 12400000 ZZH R&B MH

## 2020-02-27 PROCEDURE — 99232 SBSQ HOSP IP/OBS MODERATE 35: CPT | Performed by: PSYCHIATRY & NEUROLOGY

## 2020-02-27 PROCEDURE — 25000132 ZZH RX MED GY IP 250 OP 250 PS 637: Performed by: PSYCHIATRY & NEUROLOGY

## 2020-02-27 RX ADMIN — LAMOTRIGINE 300 MG: 100 TABLET ORAL at 08:40

## 2020-02-27 RX ADMIN — MIRTAZAPINE 45 MG: 45 TABLET, FILM COATED ORAL at 20:57

## 2020-02-27 RX ADMIN — VENLAFAXINE HYDROCHLORIDE 225 MG: 150 CAPSULE, EXTENDED RELEASE ORAL at 08:40

## 2020-02-27 RX ADMIN — BREXPIPRAZOLE 3 MG: 1 TABLET ORAL at 08:40

## 2020-02-27 ASSESSMENT — ACTIVITIES OF DAILY LIVING (ADL)
ORAL_HYGIENE: INDEPENDENT
LAUNDRY: UNABLE TO COMPLETE
LAUNDRY: UNABLE TO COMPLETE
HYGIENE/GROOMING: INDEPENDENT
ORAL_HYGIENE: INDEPENDENT
HYGIENE/GROOMING: INDEPENDENT
DRESS: SCRUBS (BEHAVIORAL HEALTH);INDEPENDENT
DRESS: SCRUBS (BEHAVIORAL HEALTH)

## 2020-02-27 NOTE — PROGRESS NOTES
"[facility]  Psychiatric Progress Note    Length of stay (days): 5        Interim History:   The patient's care was discussed with the treatment team during the daily team meeting and/or staff's chart notes were reviewed.  Staff report: The patient had been refusing food and fluids.  Excepting medications with a small amount of water.    Depression severity scale 0-10 (10=most severe):  Today: 10    She continues to report severe depressed mood.    She continues to endorse suicidal ideation, asking to be discharged home so that she may overdose on medications.  Her current plan for suicide is to starve herself to death.    She did report having a good visit with her  and son yesterday.  She explains that her  seems to be trying harder to appreciate her mental health condition.    She is clearly verbalizing feelings of helplessness and hopelessness.  She reports ability to maintain safety in the hospital setting.     No homicidal thoughts reported.  No psychotic symptoms reported.    Energy is low, appetite is low, concentration is poor, anhedonia is moderate.    Tolerating medications without side effects.           Medications:       brexpiprazole  3 mg Oral Daily     lamoTRIgine  300 mg Oral Daily     lidocaine   Transdermal Q8H     mirtazapine  45 mg Oral At Bedtime     venlafaxine  225 mg Oral Daily with breakfast          Allergies:   No Known Allergies       Labs:     No results found for this or any previous visit (from the past 24 hour(s)).       Psychiatric Examination:     /73   Pulse 87   Temp 98.3  F (36.8  C) (Oral)   Resp 16   Ht 1.651 m (5' 5\")   Wt 100.5 kg (221 lb 8 oz)   SpO2 96%   BMI 36.86 kg/m    Weight is 221 lbs 8 oz  Body mass index is 36.86 kg/m .  Orthostatic Vitals     None            Appearance: awake, alert  Attitude:  cooperative  Eye Contact:  fair  Mood:  depressed  Affect:  intensity is blunted  Speech:  decreased prosody  Psychomotor " Behavior:  no evidence of tardive dyskinesia, dystonia, or tics  Throught Process:  linear  Associations:  no loose associations  Thought Content:  no evidence of psychotic thought and active suicidal ideation present  Insight:  partial  Judgement:  fair  Oriented to:  time, person, and place  Attention Span and Concentration:  fair  Recent and Remote Memory:  intact    Clinical Global Impressions  First:     Most recent:            Precautions:     Behavioral Orders   Procedures     Code 1 - Restrict to Unit     Code 2 - 1:1 Staff Supervision     For ECT only     Code 2 - 1:1 Staff Supervision     For ECT only     Electroconvulsive therapy     Series of up to 12 treatments. Begin Date: 2/26/20   Treating Psychiatrist providing ECT: Doug Devi MD   Notified on: 2/25/20     Fall precautions     Fall precautions     Routine Programming     As clinically indicated     Status 15     Every 15 minutes.          DIagnoses:     Major depressive disorder-recurrent, severe  Generalized anxiety disorder  Unspecified personality disorder         Plan:     Her prior effective medications have been resumed which include a combination of Effexor and Remeron to address her depressive and anxiety disorders.  Rexulti has been resumed for augmentation of her antidepressants.  Lamotrigine has been resumed for management of affective instability.    Treatment with ECT has been resumed while under a [facility].  Procedure notes from today were reviewed and the procedure occurred without complications.  Treatment will be continued twice a week, Monday and Wednesday of this week, then changed to Monday and Friday beginning next week.  We will plan to petition the courts to resume treatments 3 times a week.    Psychosocial treatments to be addressed with social work consult and groups.  Consider neuropsychological testing to explore underlying personality characteristics that could be complicating her treatment course.   Brief supportive psychotherapy was provided today.  She was educated regarding utilizing [facility] as a resource to share with her  in hopes of improving support and understanding from her family members.    Legal Status: full commitment with [facility]les Andersen    Disposition: To be determined.  Ongoing psychosocial stressors continue to be a barrier to full recovery from her illness.  The most ideal disposition would be to an intensive residential treatment facility.

## 2020-02-27 NOTE — PLAN OF CARE
Pt has been isolative and withdrawn in her room throughout the day shift. Presents a flat affect; mood is depressed, sad and hopeless. Keeps to herself and has been seen in the lounge periodically, but goes back to her room shortly after. Pt made a few phone calls. Respectful to peers and staff. No shower; untidy and neglected grooming. No groups.
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
## 2020-02-27 NOTE — PROGRESS NOTES
"[facility]  Psychiatric Progress Note    Length of stay (days): 6        Interim History:   The patient's care was discussed with the treatment team during the daily team meeting and/or staff's chart notes were reviewed.  Staff report: The patient had been refusing food and fluids.  Excepting medications with a small amount of water.    Depression severity scale 0-10 (10=most severe):  Today: 10    No significant changes since yesterday.  She recalls having a pleasant conversation with her  and son who hoping to visit yesterday however did not find the time to do so.    She continues to report severe depressed mood.    She continues to endorse suicidal ideation, asking to be discharged home so that she may overdose on medications.  Her current plan for suicide is to starve herself to death.  She tells me that this is day #11 that she has not eaten.  She inquired regarding how many days she needs to start herself in order to die.    She is clearly verbalizing feelings of helplessness and hopelessness.  She reports ability to maintain safety in the hospital setting.     No homicidal thoughts reported.  No psychotic symptoms reported.    Energy is low, appetite is low, concentration is poor, anhedonia is moderate.    Tolerating medications without side effects.           Medications:       brexpiprazole  3 mg Oral Daily     lamoTRIgine  300 mg Oral Daily     lidocaine   Transdermal Q8H     mirtazapine  45 mg Oral At Bedtime     venlafaxine  225 mg Oral Daily with breakfast          Allergies:   No Known Allergies       Labs:     No results found for this or any previous visit (from the past 24 hour(s)).       Psychiatric Examination:     /81   Pulse 87   Temp 98.3  F (36.8  C) (Oral)   Resp 16   Ht 1.651 m (5' 5\")   Wt 100.5 kg (221 lb 8 oz)   SpO2 99%   BMI 36.86 kg/m    Weight is 221 lbs 8 oz  Body mass index is 36.86 kg/m .  Orthostatic Vitals     None            Appearance: " awake, alert  Attitude:  cooperative  Eye Contact:  fair  Mood:  depressed  Affect:  intensity is blunted  Speech:  decreased prosody  Psychomotor Behavior:  no evidence of tardive dyskinesia, dystonia, or tics  Throught Process:  linear  Associations:  no loose associations  Thought Content:  no evidence of psychotic thought and active suicidal ideation present  Insight:  partial  Judgement:  fair  Oriented to:  time, person, and place  Attention Span and Concentration:  fair  Recent and Remote Memory:  intact    Clinical Global Impressions  First:     Most recent:            Precautions:     Behavioral Orders   Procedures     Code 1 - Restrict to Unit     Code 2 - 1:1 Staff Supervision     For ECT only     Code 2 - 1:1 Staff Supervision     For ECT only     Electroconvulsive therapy     Series of up to 12 treatments. Begin Date: 2/26/20   Treating Psychiatrist providing ECT: Doug Devi MD   Notified on: 2/25/20     Fall precautions     Fall precautions     Routine Programming     As clinically indicated     Status 15     Every 15 minutes.          DIagnoses:     Major depressive disorder-recurrent, severe  Generalized anxiety disorder  Unspecified personality disorder         Plan:     Her prior effective medications have been resumed which include a combination of Effexor and Remeron to address her depressive and anxiety disorders.  Rexulti has been resumed for augmentation of her antidepressants.  Lamotrigine has been resumed for management of affective instability.    Treatment with ECT has been resumed while under a [facility]en.  Next treatment session is scheduled for Monday.  The patient is currently allowed treatment twice a week under her current [facility] Andersen order which will be scheduled on Mondays and Fridays.  Documentation was submitted to the courts today requesting permission to proceed with treatments 3 times a day.    Psychosocial treatments to be addressed with social work  consult and groups.  Consider neuropsychological testing to explore underlying personality characteristics that could be complicating her treatment course.  Brief supportive psychotherapy was provided today.  She was educated regarding utilizing [facility] as a resource to share with her  in hopes of improving support and understanding from her family members.    Legal Status: full commitment with Robles Andersen    Disposition: To be determined.  Ongoing psychosocial stressors continue to be a barrier to full recovery from her illness.  The most ideal disposition would be to an intensive residential treatment facility.

## 2020-02-28 PROCEDURE — 99232 SBSQ HOSP IP/OBS MODERATE 35: CPT | Performed by: PSYCHIATRY & NEUROLOGY

## 2020-02-28 PROCEDURE — 25000132 ZZH RX MED GY IP 250 OP 250 PS 637: Performed by: PSYCHIATRY & NEUROLOGY

## 2020-02-28 PROCEDURE — 12400000 ZZH R&B MH

## 2020-02-28 RX ADMIN — BREXPIPRAZOLE 3 MG: 1 TABLET ORAL at 08:56

## 2020-02-28 RX ADMIN — LAMOTRIGINE 300 MG: 100 TABLET ORAL at 08:57

## 2020-02-28 RX ADMIN — MIRTAZAPINE 45 MG: 45 TABLET, FILM COATED ORAL at 21:24

## 2020-02-28 RX ADMIN — VENLAFAXINE HYDROCHLORIDE 225 MG: 150 CAPSULE, EXTENDED RELEASE ORAL at 08:57

## 2020-02-28 ASSESSMENT — ACTIVITIES OF DAILY LIVING (ADL)
ORAL_HYGIENE: INDEPENDENT
LAUNDRY: WITH SUPERVISION
LAUNDRY: WITH SUPERVISION
HYGIENE/GROOMING: INDEPENDENT
ORAL_HYGIENE: INDEPENDENT
HYGIENE/GROOMING: INDEPENDENT
DRESS: INDEPENDENT
DRESS: INDEPENDENT

## 2020-02-28 NOTE — PROGRESS NOTES
Pt's Provisional Discharge was revoked on 2-18-20, therefore, pt is currently under full commitment.   Original commitment:  12-19-19, as MI to [facility].   [facility] includes:  Rexalti, Vraylar, Latuda, and Abilifreese.     [facility] order is the same date as the commitment order.     The [facility] Sr order was filed later, so the date of that order is 12-27-19.       During patient's last admission to [address], the acute series of ECT was exhausted/administered.   Therefore, the only remaining ECT treatments on the [facility] Sr order are the maintenance treatments.     Dr Laughlin states he needs to have another acute series of ECT tx's administered during this current admission.    I contacted [facility] Atty's office and received direction from Assistant [facility] Atty, oRbbie De La Torre, as to how to proceed.     He instructed us to submit an entirely new [facility] Rs petition.    Yesterday, Dr Laughlin completed the new Robles petition and I submitted it to the [facility] Atty's office.   I made a follow up call to ensure they received it.   They did.   It is currently being processed/filed with court.     Meanwhile, Dr Laughlin is regularly assessing whether this patient needs ECT administered on an emergency basis.

## 2020-02-28 NOTE — PLAN OF CARE
Problem: Suicidal Behavior  Goal: Suicidal Behavior is Absent or Managed  2/27/2020 2213 by Jacinta Friedman RN  Flowsheets (Taken 2/27/2020 2213)  Mutually Determined Action Steps (Facilitate Resolution of Suicidal Intent): identifies protective factors; sets future-oriented goal; identifies crisis plan  Mutually Determined Action Steps (Provide Immediate/Ongoing Protective Physical Environment): verbalizes safety check rationale; identifies home safety strategy; shares suicidal thoughts  Note:   Pt was visible in the lounge for most of the shift. Declined snack when offered. Pt was encourage to eat and was presented with broth, saltine crackers, and a cup of ice water. Pt was observed drinking some of the broth. Later writer noticed that the saltine crackers and water were also consumed. Pt was also given more broth, vanilla pudding and crackers for snacks. Pt was med compliant but did endorse a plan to slowly commit suicide by starvation and poisoning of her kidneys. During one on one, pt spoke in details about denouncing her gary, that her children will be better off without her and that her  can remarried so that the children can have a good step mother. Appears that pt was tearful, therapeutic communication rendered. Pt  called to get an update about the forms; staff provided information about no change in form status. Pt showed some insights; conversed about helping her son getting ready for school and making breakfast when she was home. Pt was encouraged to call her children so that she can feel comforted. Pt appears pleased after making the phone call. Will continue to monitor for safety.  Drank a total of 860cc this shift with 3 saltines, one gram cracker and half vanilla pudding.

## 2020-02-28 NOTE — PROGRESS NOTES
"[facility]  Psychiatric Progress Note    Length of stay (days): 7        Interim History:   The patient's care was discussed with the treatment team during the daily team meeting and/or staff's chart notes were reviewed.  Staff report: The patient had been refusing food and fluids.  Excepting medications with a small amount of water.    Depression severity scale 0-10 (10=most severe):  Today: 10    No significant changes since yesterday.  She recalls having a pleasant conversation with her  and son who hoping to visit yesterday however did not find the time to do so.    She continues to report severe depressed mood.    She continues to endorse suicidal ideation, asking to be discharged home so that she may overdose on medications.  Her current plan for suicide is to starve herself to death.     She is clearly verbalizing feelings of helplessness and hopelessness.  She reports ability to maintain safety in the hospital setting.     No homicidal thoughts reported.  No psychotic symptoms reported.    Energy is low, appetite is low, concentration is poor, anhedonia is moderate.    Tolerating medications without side effects.           Medications:       brexpiprazole  3 mg Oral Daily     lamoTRIgine  300 mg Oral Daily     lidocaine   Transdermal Q8H     mirtazapine  45 mg Oral At Bedtime     venlafaxine  225 mg Oral Daily with breakfast          Allergies:   No Known Allergies       Labs:     No results found for this or any previous visit (from the past 24 hour(s)).       Psychiatric Examination:     /76   Pulse 86   Temp 98.4  F (36.9  C) (Oral)   Resp 16   Ht 1.651 m (5' 5\")   Wt 100.5 kg (221 lb 8 oz)   SpO2 97%   BMI 36.86 kg/m    Weight is 221 lbs 8 oz  Body mass index is 36.86 kg/m .  Orthostatic Vitals     None            Appearance: awake, alert  Attitude:  cooperative  Eye Contact:  fair  Mood:  depressed  Affect:  intensity is blunted  Speech:  decreased " prosody  Psychomotor Behavior:  no evidence of tardive dyskinesia, dystonia, or tics  Throught Process:  linear  Associations:  no loose associations  Thought Content:  no evidence of psychotic thought and active suicidal ideation present  Insight:  partial  Judgement:  fair  Oriented to:  time, person, and place  Attention Span and Concentration:  fair  Recent and Remote Memory:  intact    Clinical Global Impressions  First:     Most recent:            Precautions:     Behavioral Orders   Procedures     Code 1 - Restrict to Unit     Code 2 - 1:1 Staff Supervision     For ECT only     Code 2 - 1:1 Staff Supervision     For ECT only     Electroconvulsive therapy     Series of up to 12 treatments. Begin Date: 2/26/20   Treating Psychiatrist providing ECT: Doug Devi MD   Notified on: 2/25/20     Fall precautions     Fall precautions     Routine Programming     As clinically indicated     Status 15     Every 15 minutes.          DIagnoses:     Major depressive disorder-recurrent, severe  Generalized anxiety disorder  Unspecified personality disorder         Plan:     Her prior effective medications have been resumed which include a combination of Effexor and Remeron to address her depressive and anxiety disorders.  Rexulti has been resumed for augmentation of her antidepressants.  Lamotrigine has been resumed for management of affective instability.    Treatment with ECT has been resumed while under a [facility]les [facility]en.  Next treatment session is scheduled for Monday.  The patient is currently allowed treatment twice a week under her current [facility]les Andersen order which will be scheduled on Mondays and Fridays.  Documentation was submitted to the courts today requesting permission to proceed with treatments 3 times a day.    Psychosocial treatments to be addressed with social work consult and groups.  Consider neuropsychological testing to explore underlying personality characteristics that could be  complicating her treatment course.  Brief supportive psychotherapy was provided today.  She was educated regarding the options to resign from her place of employment, noting she has not worked for several months, to assist in qualifying for medical assistance through which residential treatment could become an option.    Legal Status: full commitment with [facility]en    Disposition: To be determined.  Ongoing psychosocial stressors continue to be a barrier to full recovery from her illness.  The most ideal disposition would be to an intensive residential treatment facility.

## 2020-02-28 NOTE — PLAN OF CARE
Shift Update: Pt mood is depressed Thought process is impaired. Insight is poor. Pt has  suicidal ideation to starve self. Pt would not eat breakfast or lunch but did take a bottle of gator aid. She is in bed resting most of shift but at 1400 came out and is watching TV with another peer. Encourage pt to eat .

## 2020-02-29 PROCEDURE — 25000132 ZZH RX MED GY IP 250 OP 250 PS 637: Performed by: PSYCHIATRY & NEUROLOGY

## 2020-02-29 PROCEDURE — 12400000 ZZH R&B MH

## 2020-02-29 RX ADMIN — BREXPIPRAZOLE 3 MG: 1 TABLET ORAL at 08:15

## 2020-02-29 RX ADMIN — MIRTAZAPINE 45 MG: 45 TABLET, FILM COATED ORAL at 21:03

## 2020-02-29 RX ADMIN — LAMOTRIGINE 300 MG: 100 TABLET ORAL at 08:15

## 2020-02-29 RX ADMIN — VENLAFAXINE HYDROCHLORIDE 225 MG: 150 CAPSULE, EXTENDED RELEASE ORAL at 08:15

## 2020-02-29 ASSESSMENT — ACTIVITIES OF DAILY LIVING (ADL)
DRESS: PROMPTS
LAUNDRY: WITH SUPERVISION
ORAL_HYGIENE: PROMPTS
ORAL_HYGIENE: INDEPENDENT
HYGIENE/GROOMING: PROMPTS
DRESS: SCRUBS (BEHAVIORAL HEALTH)
LAUNDRY: UNABLE TO COMPLETE
HYGIENE/GROOMING: INDEPENDENT

## 2020-02-29 NOTE — PROGRESS NOTES
"Pt is depressed, withdrawn, isolative. Impaired thought process. Poor insight. Suicidal ideation to \"die in a natural way by starving\". Pt did drink 16oz of coke, eat 10% of supper, and drink part of a milk shake brought in my visitors. Bed resting most of shift.   "

## 2020-02-29 NOTE — PLAN OF CARE
"  Problem: General Plan of Care (Inpatient Behavioral)  Goal: Individualization/Patient Specific Goal (IP Behavioral)  Description  The patient and/or their representative will achieve their patient-specific goals related to the plan of care.    The patient-specific goals include:    1. Absence of suicidal ideation with safety plan in place.  2. Effective medication regime with patient compliance.  3. Stabilization of mood and thought processes.  4. Improved insight into mental health issues.  5. Able to identify and utilize positive coping skills.  6. Plan in place for ongoing treatment and support.     Outcome: No Change  Note:   Pt spent the shift in her room. She refused breakfast and lunch. She did shower. During 1:1 she continued to endorse suicidal ideation with a plan to \"starve myself to death.\" She stated she hasn't eaten in 13 days. Pt presents with a flat affect and depressed mood, softspoken, psychomotor agitated. She states she lacks the concentration to read, as she usually spends her time. Vocalized wanting to discharge. When asked about ECT she said she did believe it helped last time, but it only lasted temporarily. Writer encouraged her to continue to make needs known to staff. Compliant with medications and nursing cares.      "

## 2020-03-01 PROCEDURE — 12400000 ZZH R&B MH

## 2020-03-01 PROCEDURE — 25000132 ZZH RX MED GY IP 250 OP 250 PS 637: Performed by: PSYCHIATRY & NEUROLOGY

## 2020-03-01 RX ADMIN — BREXPIPRAZOLE 3 MG: 1 TABLET ORAL at 08:38

## 2020-03-01 RX ADMIN — MIRTAZAPINE 45 MG: 45 TABLET, FILM COATED ORAL at 20:49

## 2020-03-01 RX ADMIN — VENLAFAXINE HYDROCHLORIDE 225 MG: 150 CAPSULE, EXTENDED RELEASE ORAL at 08:38

## 2020-03-01 RX ADMIN — LAMOTRIGINE 300 MG: 100 TABLET ORAL at 08:38

## 2020-03-01 ASSESSMENT — ACTIVITIES OF DAILY LIVING (ADL)
DRESS: INDEPENDENT
LAUNDRY: WITH SUPERVISION
ORAL_HYGIENE: INDEPENDENT
HYGIENE/GROOMING: INDEPENDENT
DRESS: SCRUBS (BEHAVIORAL HEALTH)
HYGIENE/GROOMING: INDEPENDENT
ORAL_HYGIENE: INDEPENDENT
LAUNDRY: WITH SUPERVISION
ORAL_HYGIENE: INDEPENDENT
DRESS: INDEPENDENT
HYGIENE/GROOMING: INDEPENDENT
LAUNDRY: WITH SUPERVISION

## 2020-03-01 NOTE — PLAN OF CARE
"  Problem: General Plan of Care (Inpatient Behavioral)  Goal: Individualization/Patient Specific Goal (IP Behavioral)  Description  The patient and/or their representative will achieve their patient-specific goals related to the plan of care.    The patient-specific goals include:    1. Absence of suicidal ideation with safety plan in place.  2. Effective medication regime with patient compliance.  3. Stabilization of mood and thought processes.  4. Improved insight into mental health issues.  5. Able to identify and utilize positive coping skills.  6. Plan in place for ongoing treatment and support.     Outcome: No Change  Note:   Pt spent more time in the [facility] lounge today. She continues to present with a blunted affect, appears depressed. She stated she's \"feeling down today,\" and said her son's 17th birthday is tomorrow. She engaged appropriately with staff and smiled a few times. She continues to endorse suicidal ideation with a plan to starve herself. She declined both breakfast and lunch, but appears to have consumed her boost supplement. She was compliant with medications and nursing cares. Writer encouraged her to make her needs known to staff.      "

## 2020-03-01 NOTE — PLAN OF CARE
"Pt presents with sad affect and hopeless mood. Pt began the shift laying in bed refusing unit activity. When dinner arrives pt refused to eat and continues to admit she plans to \"starve herself to death.\" Through much convincing staff eventually convinced pt to eat ice cream and drink soda. Needs prompts of all types. Pt wants to staff to give up on her but when staff pushes through she becomes more cooperative. Pt is med compliant and endorses si.   "

## 2020-03-02 ENCOUNTER — ANESTHESIA (OUTPATIENT)
Dept: SURGERY | Facility: CLINIC | Age: 35
End: 2020-03-02

## 2020-03-02 ENCOUNTER — ANESTHESIA EVENT (OUTPATIENT)
Dept: SURGERY | Facility: CLINIC | Age: 35
End: 2020-03-02

## 2020-03-02 ENCOUNTER — APPOINTMENT (OUTPATIENT)
Dept: SURGERY | Facility: CLINIC | Age: 35
End: 2020-03-02
Payer: COMMERCIAL

## 2020-03-02 PROCEDURE — 90870 ELECTROCONVULSIVE THERAPY: CPT

## 2020-03-02 PROCEDURE — 25000128 H RX IP 250 OP 636: Performed by: NURSE ANESTHETIST, CERTIFIED REGISTERED

## 2020-03-02 PROCEDURE — 99232 SBSQ HOSP IP/OBS MODERATE 35: CPT | Mod: 25 | Performed by: PSYCHIATRY & NEUROLOGY

## 2020-03-02 PROCEDURE — 25000125 ZZHC RX 250: Performed by: NURSE ANESTHETIST, CERTIFIED REGISTERED

## 2020-03-02 PROCEDURE — 12400000 ZZH R&B MH

## 2020-03-02 PROCEDURE — 25000132 ZZH RX MED GY IP 250 OP 250 PS 637: Performed by: PSYCHIATRY & NEUROLOGY

## 2020-03-02 PROCEDURE — 40000671 ZZH STATISTIC ANESTHESIA CASE

## 2020-03-02 RX ORDER — SODIUM CHLORIDE, SODIUM LACTATE, POTASSIUM CHLORIDE, CALCIUM CHLORIDE 600; 310; 30; 20 MG/100ML; MG/100ML; MG/100ML; MG/100ML
500 INJECTION, SOLUTION INTRAVENOUS CONTINUOUS
Status: DISCONTINUED | OUTPATIENT
Start: 2020-03-02 | End: 2020-03-06

## 2020-03-02 RX ORDER — ACETAMINOPHEN 325 MG/1
650 TABLET ORAL EVERY 4 HOURS PRN
Status: DISCONTINUED | OUTPATIENT
Start: 2020-03-02 | End: 2020-04-25 | Stop reason: HOSPADM

## 2020-03-02 RX ADMIN — BREXPIPRAZOLE 3 MG: 1 TABLET ORAL at 08:51

## 2020-03-02 RX ADMIN — LAMOTRIGINE 300 MG: 100 TABLET ORAL at 08:51

## 2020-03-02 RX ADMIN — METHOHEXITAL SODIUM 100 MG: 500 INJECTION, POWDER, LYOPHILIZED, FOR SOLUTION INTRAMUSCULAR; INTRAVENOUS; RECTAL at 07:23

## 2020-03-02 RX ADMIN — ACETAMINOPHEN 650 MG: 325 TABLET, FILM COATED ORAL at 21:03

## 2020-03-02 RX ADMIN — MIRTAZAPINE 45 MG: 45 TABLET, FILM COATED ORAL at 21:03

## 2020-03-02 RX ADMIN — VENLAFAXINE HYDROCHLORIDE 225 MG: 150 CAPSULE, EXTENDED RELEASE ORAL at 08:50

## 2020-03-02 RX ADMIN — SUCCINYLCHOLINE CHLORIDE 100 MG: 20 INJECTION, SOLUTION INTRAMUSCULAR; INTRAVENOUS; PARENTERAL at 07:23

## 2020-03-02 ASSESSMENT — ACTIVITIES OF DAILY LIVING (ADL)
ORAL_HYGIENE: INDEPENDENT
DRESS: INDEPENDENT
HYGIENE/GROOMING: INDEPENDENT
LAUNDRY: WITH SUPERVISION

## 2020-03-02 ASSESSMENT — LIFESTYLE VARIABLES: TOBACCO_USE: 0

## 2020-03-02 NOTE — PROGRESS NOTES
"[facility]  Psychiatric Progress Note    Length of stay (days): 10        Interim History:   The patient's care was discussed with the treatment team during the daily team meeting and/or staff's chart notes were reviewed.  Staff report: No acute issues over the weekend.  She has continued to minimize food and fluid intake however this morning she did eat breakfast after ECT.    Depression severity scale 0-10 (10=most severe):  Today:9    She continues to report severe depressed mood.    She continues to endorse suicidal ideation, helplessness and hopelessness.    She reports ability to maintain safety in the hospital setting however is not able to ensure her own safety if discharged home today.    No homicidal thoughts reported.  No psychotic symptoms reported.    Energy is low, appetite is low, concentration is poor, anhedonia is moderate.    Tolerating medications without side effects.           Medications:       brexpiprazole  3 mg Oral Daily     lamoTRIgine  300 mg Oral Daily     mirtazapine  45 mg Oral At Bedtime     venlafaxine  225 mg Oral Daily with breakfast          Allergies:   No Known Allergies       Labs:     No results found for this or any previous visit (from the past 24 hour(s)).       Psychiatric Examination:     BP 97/71   Pulse 91   Temp 98.6  F (37  C) (Oral)   Resp 14   Ht 1.651 m (5' 5\")   Wt 100.5 kg (221 lb 8 oz)   SpO2 97%   BMI 36.86 kg/m    Weight is 221 lbs 8 oz  Body mass index is 36.86 kg/m .  Orthostatic Vitals     None            Appearance: awake, alert  Attitude:  cooperative  Eye Contact:  fair  Mood:  depressed  Affect:  intensity is blunted  Speech:  decreased prosody  Psychomotor Behavior:  no evidence of tardive dyskinesia, dystonia, or tics  Throught Process:  linear  Associations:  no loose associations  Thought Content:  no evidence of psychotic thought and active suicidal ideation present  Insight:  partial  Judgement:  fair  Oriented to:  time, " person, and place  Attention Span and Concentration:  fair  Recent and Remote Memory:  intact    Clinical Global Impressions  First:     Most recent:            Precautions:     Behavioral Orders   Procedures     Code 1 - Restrict to Unit     Code 2 - 1:1 Staff Supervision     For ECT only     Code 2 - 1:1 Staff Supervision     For ECT only     Electroconvulsive therapy     Series of up to 12 treatments. Begin Date: 2/26/20   Treating Psychiatrist providing ECT: Doug Devi MD   Notified on: 2/25/20     Electroconvulsive therapy     Series of up to 12 treatments. Begin Date: 2/26/20   Treating Psychiatrist providing ECT: Doug Devi MD   Notified on: 2/25/20     Fall precautions     Fall precautions     Routine Programming     As clinically indicated     Status 15     Every 15 minutes.          DIagnoses:     Major depressive disorder-recurrent, severe  Generalized anxiety disorder  Unspecified personality disorder         Plan:     Continue her current medications which include a combination of Effexor and Remeron to address her depressive and anxiety disorders.  Rexulti has been resumed for augmentation of her antidepressants.  Lamotrigine has been resumed for management of affective instability.    Treatment with ECT has been resumed while under a [facility].  Treatment notes reviewed and treatment continues to proceed without complications.  The patient is currently allowed treatment twice a week under her current [facility]les [facility]en order which will be scheduled on Mondays and Fridays.  Documentation was submitted to the courts today requesting permission to proceed with treatments 3 times a day.    Psychosocial treatments to be addressed with social work consult and groups.    -Once she is agreeable, neuropsychological testing may be helpful to identify any underlying personality characteristics that may be complicating her treatment course.  -Educated on [place] and encouraged to inform  her family of this resource  -We discussed the option of resigning from her current place of employment so additional [facility] resources can be accessed that may help with residential treatment and/or structured housing placement    Legal Status: full commitment with [facility]    Disposition: To be determined.  Ongoing psychosocial stressors continue to be a barrier to full recovery from her illness.  The most ideal disposition would be to an intensive residential treatment facility.

## 2020-03-02 NOTE — ANESTHESIA CARE TRANSFER NOTE
Patient: Misty Parham    * No procedures listed *    Diagnosis: * No pre-op diagnosis entered *  Diagnosis Additional Information: No value filed.    Anesthesia Type:   General     Note:  Airway :Nasal Cannula  Patient transferred to:PACU  Comments: Native airway general anesthetic.  Patient hyperventilated with 100% oxygen via mask prior to treatment.   Anesthesia induced using patent peripheral IV.    Bite block placed between molars to protect teeth and tongue.     After induction of seizure patient mask ventilated with 100% oxygen until spontaneous respirations returned.     At time of handoff to PACU, patient exhibited spontaneous respirations, adequate tidal volumes, airway patent. Oxygen via nasal cannula at 2 liters per minute to PACU in cart with siderails up, connected to wall O2 in PACU. All monitors and alarms on and functioning. Report given to PACU RN and questions answered.   Handoff Report: Identifed the Patient, Identified the Reponsible Provider, Reviewed the pertinent medical history, Discussed the surgical course, Reviewed Intra-OP anesthesia mangement and issues during anesthesia, Set expectations for post-procedure period and Allowed opportunity for questions and acknowledgement of understanding      Vitals: (Last set prior to Anesthesia Care Transfer)    CRNA VITALS  3/2/2020 0702 - 3/2/2020 0732      3/2/2020             Pulse:  113    SpO2:  99 %    Resp Rate (observed):  19    Resp Rate (set):  10                Electronically Signed By: WILLIAN Mills CRNA  March 2, 2020  7:32 AM

## 2020-03-02 NOTE — PLAN OF CARE
BEHAVIORAL TEAM DISCUSSION    Participants: MD, CM, OT, RN  Progress: depressed,suicidal ideation, doing better on ECT days, eating and drinking  Anticipated length of stay: unknown  Continued Stay Criteria/Rationale: Petitioning for ECT treatments  Medical/Physical: NA  Precautions:   Behavioral Orders   Procedures    Code 1 - Restrict to Unit    Code 2 - 1:1 Staff Supervision     For ECT only    Code 2 - 1:1 Staff Supervision     For ECT only    Electroconvulsive therapy     Series of up to 12 treatments. Begin Date: 2/26/20   Treating Psychiatrist providing ECT: Doug Devi MD   Notified on: 2/25/20    Electroconvulsive therapy     Series of up to 12 treatments. Begin Date: 2/26/20   Treating Psychiatrist providing ECT: Doug Devi MD   Notified on: 2/25/20    Fall precautions    Fall precautions    Routine Programming     As clinically indicated    Status 15     Every 15 minutes.     Plan: medication management, petitioning for ECT, Continue twice a week ECT   Rationale for change in precautions or plan: NA

## 2020-03-02 NOTE — PROCEDURES
[facility] ECT Procedure Note     Misty Parham [id number]   34 year old 1985     Patient Status: Inpatient    No Known Allergies    Weight:  221 lbs 8 oz    Patient Preparations: Glasses/Contacts removed         Diagnosis:   Major depression     Medications ineffective, Deteriorating fluid/electrolyte/nutritional status, History of good ECT response in one or more previous episodes of illness and currently receiving maintenance  ECT per lucas truong order 2X per week for duration of committment       Pause for the Cause:     Right patient Yes   Right procedure/laterality settings: Yes   Right diagnosis Yes          Intra-Procedure Documentation:     Date:  3/2/2020  Time:  7:10 AM    ECT #    Treatment number this series: 3   Total treatment number: 3   Type of ECT:  Bilateral, standard    ECT Medications administered: Brevital: 100mg  Succinyl Choline: 100mg         Clinical Narrative:     ECT was administered by Thymatron machine.  Pt has been medically cleared for procedure, consent not needed as ECT tx is being done under court order. Side effects, Risks and benefits reviewed. Misty is IP, receiving ECT under price truong order, 2 maintainence ECT tx per week for duration of committment M/W schedule  ECT Strip Summary:   Energy Level: 60 percent  Motor Seizure Duration: 32 seconds  EEG Seizure Duration:  45 seconds    Complications: No    Plan: 3rd ECT 3/4/20 per price truong order for duration of comittment    Reynaldo Kwon MD

## 2020-03-02 NOTE — ANESTHESIA PREPROCEDURE EVALUATION
Anesthesia Pre-Procedure Evaluation    Patient: Misty Parham   MRN: [id number] : 1985          Preoperative Diagnosis: * No pre-op diagnosis entered *    * No procedures listed *    Past Medical History:   Diagnosis Date     Depressive disorder      Past Surgical History:   Procedure Laterality Date     CHOLECYSTECTOMY         Anesthesia Evaluation     . Pt has had prior anesthetic. Type: General    No history of anesthetic complications          ROS/MED HX    ENT/Pulmonary:      (-) tobacco use and sleep apnea   Neurologic:      (-) Delerium   Cardiovascular:     (+) ----. : . . . :. . Previous cardiac testing date:results:date: results:ECG reviewed date:2020 results:NSR date: results:          METS/Exercise Tolerance:  >4 METS   Hematologic:         Musculoskeletal:         GI/Hepatic:        (-) GERD   Renal/Genitourinary:         Endo:     (+) Obesity (BMI 37), .      Psychiatric:     (+) psychiatric history (severe, requring ECT) depression      Infectious Disease:         Malignancy:         Other:    (+) No chance of pregnancy                         Physical Exam  Normal systems: dental    Airway   Mallampati: II  TM distance: >3 FB  Neck ROM: full    Dental     Cardiovascular   Rhythm and rate: regular and normal      Pulmonary             Lab Results   Component Value Date    WBC 9.2 2020    HGB 12.7 2020    HCT 39.1 2020     2020     2020    POTASSIUM 3.4 2020    CHLORIDE 104 2020    CO2 26 2020    BUN 13 2020    CR 0.87 2020    GLC 92 2020    CRISTINE 9.1 2020    ALBUMIN 3.8 2020    PROTTOTAL 7.6 2020    ALT 15 2020    AST 13 2020    ALKPHOS 78 2020    BILITOTAL 0.4 2020    TSH 1.08 2020    HCG Negative 2020    HCGS Negative 2020       Preop Vitals  BP Readings from Last 3 Encounters:   20 115/79   20 120/88   20 114/83    Pulse Readings from  "Last 3 Encounters:   03/01/20 70   02/21/20 109   02/18/20 95      Resp Readings from Last 3 Encounters:   03/02/20 16   02/21/20 16   02/18/20 16    SpO2 Readings from Last 3 Encounters:   03/02/20 100%   02/21/20 97%   02/18/20 99%      Temp Readings from Last 1 Encounters:   03/02/20 37.2  C (98.9  F) (Oral)    Ht Readings from Last 1 Encounters:   02/21/20 1.651 m (5' 5\")      Wt Readings from Last 1 Encounters:   02/21/20 100.5 kg (221 lb 8 oz)    Estimated body mass index is 36.86 kg/m  as calculated from the following:    Height as of this encounter: 1.651 m (5' 5\").    Weight as of this encounter: 100.5 kg (221 lb 8 oz).       Anesthesia Plan      History & Physical Review  History and physical reviewed and following examination; no interval change.    ASA Status:  2 .    NPO Status:  > 8 hours    Plan for General          Postoperative Care      Consents  Anesthetic plan, risks, benefits and alternatives discussed with:  Patient..                 Kendall Hawk MD  "

## 2020-03-02 NOTE — PLAN OF CARE
Problem: Depressive Symptoms  Goal: Depressive Symptoms  Description  Signs and symptoms of listed problems will be absent or manageable.  Suicidal ideation,   Depression.   Outcome: No Change  Flowsheets (Taken 3/2/2020 1052)  Depressive Symptoms Assessed: all  Depressive Symptoms Present: affect; mood; insight; thought process  Note:   Pt presents with a flat sad affect and depressed hopeless mood. Pt spent almost all shift in her room resting or napping. Pt had ECT #2 this morning and endorsed light headedness, slight headache, and memory fogginess. Pt's BP was 97/71. Pt was given breakfast and told to relax till she felt more steady. Pt ate her breakfast but declined lunch. Pt declined prompting to attend unit programming. Pt showered and was med compliant. Pt endorses chronic Si stating that she just doesn't want to live.

## 2020-03-03 PROCEDURE — 12400000 ZZH R&B MH

## 2020-03-03 PROCEDURE — 99232 SBSQ HOSP IP/OBS MODERATE 35: CPT | Performed by: PSYCHIATRY & NEUROLOGY

## 2020-03-03 PROCEDURE — 25000132 ZZH RX MED GY IP 250 OP 250 PS 637: Performed by: PSYCHIATRY & NEUROLOGY

## 2020-03-03 RX ADMIN — BREXPIPRAZOLE 3 MG: 1 TABLET ORAL at 08:53

## 2020-03-03 RX ADMIN — MIRTAZAPINE 45 MG: 45 TABLET, FILM COATED ORAL at 21:10

## 2020-03-03 RX ADMIN — LAMOTRIGINE 300 MG: 100 TABLET ORAL at 08:53

## 2020-03-03 RX ADMIN — VENLAFAXINE HYDROCHLORIDE 225 MG: 150 CAPSULE, EXTENDED RELEASE ORAL at 08:53

## 2020-03-03 ASSESSMENT — MIFFLIN-ST. JEOR: SCORE: 1687.89

## 2020-03-03 NOTE — PLAN OF CARE
Pt ate meals did find emesis in shower room pt states she didn't do it. In lounge states sees nothing hears nothing but does feel like hurting self would take pills but not here.

## 2020-03-03 NOTE — ED NOTES
"Mood is calm, isolative , does not socialize with peers, her appetite is improving, first she refused to eat dinner, a few minutes later she was eating and drinking well.  When asked about suicidal ideation, she stated \"   I can not think about suicide at this time\"  .    Flat affect , no psychosis, blunted affect.  "

## 2020-03-04 PROCEDURE — 99232 SBSQ HOSP IP/OBS MODERATE 35: CPT | Performed by: PSYCHIATRY & NEUROLOGY

## 2020-03-04 PROCEDURE — 25000132 ZZH RX MED GY IP 250 OP 250 PS 637: Performed by: PSYCHIATRY & NEUROLOGY

## 2020-03-04 PROCEDURE — 12400000 ZZH R&B MH

## 2020-03-04 RX ADMIN — MIRTAZAPINE 45 MG: 45 TABLET, FILM COATED ORAL at 20:07

## 2020-03-04 RX ADMIN — VENLAFAXINE HYDROCHLORIDE 225 MG: 150 CAPSULE, EXTENDED RELEASE ORAL at 09:32

## 2020-03-04 RX ADMIN — BREXPIPRAZOLE 3 MG: 1 TABLET ORAL at 09:33

## 2020-03-04 RX ADMIN — LAMOTRIGINE 300 MG: 100 TABLET ORAL at 09:33

## 2020-03-04 ASSESSMENT — ACTIVITIES OF DAILY LIVING (ADL)
HYGIENE/GROOMING: INDEPENDENT
DRESS: SCRUBS (BEHAVIORAL HEALTH)
ORAL_HYGIENE: INDEPENDENT
LAUNDRY: WITH SUPERVISION

## 2020-03-04 NOTE — PLAN OF CARE
Problem: Depressive Symptoms  Goal: Depressive Symptoms  Description  Signs and symptoms of listed problems will be absent or manageable.  Suicidal ideation,   Depression.   3/3/2020 2124 by Ivana Serrano  Outcome: No Change  Flowsheets  Taken 3/2/2020 1356 by Robert Shaver  Depressive Symptoms Assessed: all  Taken 3/3/2020 2124 by Ivana Serrano  Depressive Symptoms Present: affect;mood;suicidality;thought process;insight  Note:   Pt presents with a flat affect and depressed mood. Pt thought process is impaired, insight is poor. Pt is withdrawn to room for most of the shift, staff encouraged pt to eat dinner in the lounge and she complied. Pt did not attend unit programming. Pt endorses Si but states that she contracts for safety while here.

## 2020-03-04 NOTE — PROGRESS NOTES
"[facility]  Psychiatric Progress Note    Length of stay (days): 11        Interim History:   The patient's care was discussed with the treatment team during the daily team meeting and/or staff's chart notes were reviewed.  Staff report: No acute issues overnight.  She ate breakfast and dinner yesterday.  He had some breakfast this morning.  Taking medications.    Depression severity scale 0-10 (10=most severe):  Today: 9    She spoke with her  who supports her decision to resign from her job and pursue medical assistance and disability benefits.    She continues to report severe depressed mood.    She continues to endorse suicidal ideation, helplessness and hopelessness.    She reports ability to maintain safety in the hospital setting however is not able to ensure her own safety if discharged home today.    No homicidal thoughts reported.  No psychotic symptoms reported.    Energy is low, appetite is low, concentration is poor, anhedonia is moderate.    Tolerating medications without side effects.           Medications:       brexpiprazole  3 mg Oral Daily     lamoTRIgine  300 mg Oral Daily     mirtazapine  45 mg Oral At Bedtime     venlafaxine  225 mg Oral Daily with breakfast          Allergies:   No Known Allergies       Labs:     No results found for this or any previous visit (from the past 24 hour(s)).       Psychiatric Examination:     BP 97/65   Pulse 79   Temp 99  F (37.2  C) (Oral)   Resp 16   Ht 1.651 m (5' 5\")   Wt 99.7 kg (219 lb 12.8 oz)   SpO2 99%   BMI 36.58 kg/m    Weight is 219 lbs 12.8 oz  Body mass index is 36.58 kg/m .  Orthostatic Vitals     None            Appearance: awake, alert  Attitude:  cooperative  Eye Contact:  fair  Mood:  depressed  Affect:  intensity is blunted  Speech:  decreased prosody  Psychomotor Behavior:  no evidence of tardive dyskinesia, dystonia, or tics  Throught Process:  linear  Associations:  no loose associations  Thought Content:  no " evidence of psychotic thought and active suicidal ideation present  Insight:  partial  Judgement:  fair  Oriented to:  time, person, and place  Attention Span and Concentration:  fair  Recent and Remote Memory:  intact    Clinical Global Impressions  First:     Most recent:            Precautions:     Behavioral Orders   Procedures     Code 1 - Restrict to Unit     Code 2 - 1:1 Staff Supervision     For ECT only     Code 2 - 1:1 Staff Supervision     For ECT only     Electroconvulsive therapy     Series of up to 12 treatments. Begin Date: 2/26/20   Treating Psychiatrist providing ECT: Doug Devi MD   Notified on: 2/25/20     Electroconvulsive therapy     Series of up to 12 treatments. Begin Date: 2/26/20   Treating Psychiatrist providing ECT: Doug Devi MD   Notified on: 2/25/20     Fall precautions     Fall precautions     Routine Programming     As clinically indicated     Status 15     Every 15 minutes.          DIagnoses:     Major depressive disorder-recurrent, severe  Generalized anxiety disorder  Unspecified personality disorder         Plan:     Continue her current medications which include a combination of Effexor and Remeron to address her depressive and anxiety disorders.  Rexulti has been resumed for augmentation of her antidepressants.  Lamotrigine has been resumed for management of affective instability.    Treatment with ECT has been resumed while under a [facility]en.  Treatment notes reviewed and treatment continues to proceed without complications.  The patient is currently allowed treatment twice a week under her current [facility]en order which will be scheduled on Mondays and Fridays.  Documentation was submitted to the courts today requesting permission to proceed with treatments 3 times a day.    Psychosocial treatments to be addressed with social work consult and groups.    -Once she is agreeable, neuropsychological testing may be helpful to identify any  underlying personality characteristics that may be complicating her treatment course.  -Educated on [place] and encouraged to inform her family of this resource  -Anticipate helping the patient apply for medical assistance    Legal Status: full commitment with [facility]en    Disposition: Once she has demonstrated adequate improvement in her mood and remission of suicidal thoughts, we will plan to pursue intensive residential treatment services.

## 2020-03-04 NOTE — PROGRESS NOTES
"[facility]  Psychiatric Progress Note    Length of stay (days): 12        Interim History:   The patient's care was discussed with the treatment team during the daily team meeting and/or staff's chart notes were reviewed.  Staff report: No acute issues overnight.  Food intake has been improving.  She ate breakfast this morning.    Depression severity scale 0-10 (10=most severe):  Today: 9    She spoke with her oldest son yesterday.  She recalls having a good conversation with him.  Her oldest daughter recently moved out and she is worried about her youngest son.  She misses her children and is thinking about them more often.  She adds \"when the depression gets really bad, I seem to forget about them.\"  She explains that her youngest son was upset to learn that she was not eating food.  After he asked her to resume food intake, she decided to do so.    She continues to report severe depressed mood.    She continues to endorse suicidal ideation, helplessness and hopelessness.    She reports ability to maintain safety in the hospital setting however is not able to ensure her own safety if discharged home today.    No homicidal thoughts reported.  No psychotic symptoms reported.    Energy is low, appetite is low, concentration is poor, anhedonia is moderate.    Tolerating medications without side effects.           Medications:       brexpiprazole  3 mg Oral Daily     lamoTRIgine  300 mg Oral Daily     mirtazapine  45 mg Oral At Bedtime     venlafaxine  225 mg Oral Daily with breakfast          Allergies:   No Known Allergies       Labs:     No results found for this or any previous visit (from the past 24 hour(s)).       Psychiatric Examination:     /75   Pulse 79   Temp 98.4  F (36.9  C) (Oral)   Resp 16   Ht 1.651 m (5' 5\")   Wt 99.7 kg (219 lb 12.8 oz)   SpO2 96%   BMI 36.58 kg/m    Weight is 219 lbs 12.8 oz  Body mass index is 36.58 kg/m .  Orthostatic Vitals     None    "         Appearance: awake, alert  Attitude:  cooperative  Eye Contact:  fair  Mood:  depressed  Affect:  intensity is blunted  Speech:  decreased prosody  Psychomotor Behavior:  no evidence of tardive dyskinesia, dystonia, or tics  Throught Process:  linear  Associations:  no loose associations  Thought Content:  no evidence of psychotic thought and active suicidal ideation present  Insight:  partial  Judgement:  fair  Oriented to:  time, person, and place  Attention Span and Concentration:  fair  Recent and Remote Memory:  intact    Clinical Global Impressions  First:     Most recent:            Precautions:     Behavioral Orders   Procedures     Code 1 - Restrict to Unit     Code 2 - 1:1 Staff Supervision     For ECT only     Code 2 - 1:1 Staff Supervision     For ECT only     Electroconvulsive therapy     Series of up to 12 treatments. Begin Date: 2/26/20   Treating Psychiatrist providing ECT: Doug Devi MD   Notified on: 2/25/20     Electroconvulsive therapy     Series of up to 12 treatments. Begin Date: 2/26/20   Treating Psychiatrist providing ECT: Doug Devi MD   Notified on: 2/25/20     Fall precautions     Fall precautions     Routine Programming     As clinically indicated     Status 15     Every 15 minutes.          DIagnoses:     Major depressive disorder-recurrent, severe  Generalized anxiety disorder  Unspecified personality disorder         Plan:     Continue her current medications which include a combination of Effexor and Remeron to address her depressive and anxiety disorders.  Rexulti has been resumed for augmentation of her antidepressants.  Lamotrigine has been resumed for management of affective instability.    Treatment with ECT has been resumed while under a [facility].  Treatment notes reviewed and treatment continues to proceed without complications.  The patient is currently allowed treatment twice a week under her current [facility] order which will be  scheduled on Mondays and Fridays.  Documentation was submitted to the courts today requesting permission to proceed with treatments 3 times a day.    Psychosocial treatments to be addressed with social work consult and groups.    -Once she is agreeable, neuropsychological testing may be helpful to identify any underlying personality characteristics that may be complicating her treatment course.  -Educated on [place] and encouraged to inform her family of this resource  -Anticipate helping the patient apply for medical assistance    Legal Status: full commitment with [facility] [employer]    Disposition: Once she has demonstrated adequate improvement in her mood and remission of suicidal thoughts, we will plan to pursue intensive residential treatment services.

## 2020-03-04 NOTE — PROGRESS NOTES
I spoke with pt today about transitioning from employer sponsored insurance to MA.     I asked her to make calls (on non-ECT days) to give her employer an end date for her employment (because that's what she wants to do)  and then ask the employer when her insurance coverage through them will end.   When I find out when her private insurance will end, I will contact our business office to assist her with an MA application.    I also called and consulted with Herbert in our business office.   The last thing we want is for pt to give up her employer insurance, only to find out she's not eligible for MA due to income limitations, between patient and her .    Herbert said he'd come have an in-person discussion with patient about these matters.

## 2020-03-04 NOTE — PLAN OF CARE
Flat affect, does brighten with conversation. Says that she does feel better, and that she misses her kids. Said going home is difficult, because last time she felt bombarded when she got there. Isolative and withdrawn to room, spent most of the shift reading. No groups. Endorse SI but no plan, contracts for safety. Med compliant

## 2020-03-05 PROCEDURE — 12400000 ZZH R&B MH

## 2020-03-05 PROCEDURE — 25000132 ZZH RX MED GY IP 250 OP 250 PS 637: Performed by: PSYCHIATRY & NEUROLOGY

## 2020-03-05 PROCEDURE — 99232 SBSQ HOSP IP/OBS MODERATE 35: CPT | Performed by: PSYCHIATRY & NEUROLOGY

## 2020-03-05 RX ADMIN — LAMOTRIGINE 300 MG: 100 TABLET ORAL at 08:44

## 2020-03-05 RX ADMIN — VENLAFAXINE HYDROCHLORIDE 225 MG: 150 CAPSULE, EXTENDED RELEASE ORAL at 08:44

## 2020-03-05 RX ADMIN — MIRTAZAPINE 45 MG: 45 TABLET, FILM COATED ORAL at 21:10

## 2020-03-05 RX ADMIN — BREXPIPRAZOLE 3 MG: 1 TABLET ORAL at 08:44

## 2020-03-05 ASSESSMENT — ACTIVITIES OF DAILY LIVING (ADL)
DRESS: INDEPENDENT
LAUNDRY: WITH SUPERVISION
HYGIENE/GROOMING: INDEPENDENT
ORAL_HYGIENE: INDEPENDENT
HYGIENE/GROOMING: INDEPENDENT
LAUNDRY: WITH SUPERVISION
DRESS: INDEPENDENT
ORAL_HYGIENE: INDEPENDENT

## 2020-03-05 NOTE — PLAN OF CARE
Problem: Adult Behavioral Health Plan of Care  Goal: Patient-Specific Goal (Individualization)  Description  1. Absence of suicidal ideation with safety plan in place.  2. Effective medication regime with patient compliance.  3. Stabilization of mood and thought processes.  4. Improved insight into mental health issues.  5. Able to identify and utilize positive coping skills.  6. Plan in place for ongoing treatment and support.     Outcome: No Change  Note:   Pt has been spending time mostly in her room bed resting. Pt continues to have thoughts about suicide and does with to be dead. Pt though does not want to commit suicide here in the hospital as she has no means to do so. Pt is bright upon approach but remains flat and depressed. Pt was encouraged to use some coping skills to help her distract from her SI. Pt was provided coloring sheet and encouraged to do some reading . Pt is pleasant and cooperative with staff. Nursing to continue to monitor.

## 2020-03-05 NOTE — PLAN OF CARE
Pt presents with flat affect but brightens upon approach. Pt states she's been feeling better. Pt has been withdrawn and isolative to room. Pt did not attend unit programming. Insight and thought process poor. Pt endorses SI, contracts for safety. Med compliant.

## 2020-03-06 ENCOUNTER — ANESTHESIA (OUTPATIENT)
Dept: SURGERY | Facility: CLINIC | Age: 35
End: 2020-03-06

## 2020-03-06 ENCOUNTER — ANESTHESIA EVENT (OUTPATIENT)
Dept: SURGERY | Facility: CLINIC | Age: 35
End: 2020-03-06

## 2020-03-06 ENCOUNTER — APPOINTMENT (OUTPATIENT)
Dept: SURGERY | Facility: CLINIC | Age: 35
End: 2020-03-06
Payer: COMMERCIAL

## 2020-03-06 PROCEDURE — 90870 ELECTROCONVULSIVE THERAPY: CPT

## 2020-03-06 PROCEDURE — 25000125 ZZHC RX 250: Performed by: NURSE ANESTHETIST, CERTIFIED REGISTERED

## 2020-03-06 PROCEDURE — 99232 SBSQ HOSP IP/OBS MODERATE 35: CPT | Mod: 25 | Performed by: PSYCHIATRY & NEUROLOGY

## 2020-03-06 PROCEDURE — 25000132 ZZH RX MED GY IP 250 OP 250 PS 637: Performed by: PSYCHIATRY & NEUROLOGY

## 2020-03-06 PROCEDURE — 12400000 ZZH R&B MH

## 2020-03-06 PROCEDURE — 40000671 ZZH STATISTIC ANESTHESIA CASE

## 2020-03-06 PROCEDURE — 25000128 H RX IP 250 OP 636: Performed by: NURSE ANESTHETIST, CERTIFIED REGISTERED

## 2020-03-06 PROCEDURE — 25800030 ZZH RX IP 258 OP 636: Performed by: ANESTHESIOLOGY

## 2020-03-06 RX ORDER — SODIUM CHLORIDE, SODIUM LACTATE, POTASSIUM CHLORIDE, CALCIUM CHLORIDE 600; 310; 30; 20 MG/100ML; MG/100ML; MG/100ML; MG/100ML
INJECTION, SOLUTION INTRAVENOUS CONTINUOUS
Status: DISCONTINUED | OUTPATIENT
Start: 2020-03-06 | End: 2020-03-09

## 2020-03-06 RX ORDER — RAMELTEON 8 MG/1
8 TABLET ORAL AT BEDTIME
Status: DISCONTINUED | OUTPATIENT
Start: 2020-03-06 | End: 2020-04-25 | Stop reason: HOSPADM

## 2020-03-06 RX ADMIN — RAMELTEON 8 MG: 8 TABLET ORAL at 20:53

## 2020-03-06 RX ADMIN — METHOHEXITAL SODIUM 100 MG: 500 INJECTION, POWDER, LYOPHILIZED, FOR SOLUTION INTRAMUSCULAR; INTRAVENOUS; RECTAL at 07:15

## 2020-03-06 RX ADMIN — BREXPIPRAZOLE 3 MG: 1 TABLET ORAL at 08:03

## 2020-03-06 RX ADMIN — LAMOTRIGINE 300 MG: 100 TABLET ORAL at 08:03

## 2020-03-06 RX ADMIN — VENLAFAXINE HYDROCHLORIDE 225 MG: 150 CAPSULE, EXTENDED RELEASE ORAL at 08:02

## 2020-03-06 RX ADMIN — SUCCINYLCHOLINE CHLORIDE 100 MG: 20 INJECTION, SOLUTION INTRAMUSCULAR; INTRAVENOUS; PARENTERAL at 07:16

## 2020-03-06 RX ADMIN — SODIUM CHLORIDE, POTASSIUM CHLORIDE, SODIUM LACTATE AND CALCIUM CHLORIDE: 600; 310; 30; 20 INJECTION, SOLUTION INTRAVENOUS at 06:28

## 2020-03-06 RX ADMIN — MIRTAZAPINE 45 MG: 45 TABLET, FILM COATED ORAL at 20:53

## 2020-03-06 ASSESSMENT — LIFESTYLE VARIABLES: TOBACCO_USE: 0

## 2020-03-06 ASSESSMENT — ACTIVITIES OF DAILY LIVING (ADL)
ORAL_HYGIENE: INDEPENDENT
LAUNDRY: WITH SUPERVISION
ORAL_HYGIENE: INDEPENDENT
DRESS: INDEPENDENT
HYGIENE/GROOMING: INDEPENDENT
HYGIENE/GROOMING: INDEPENDENT
LAUNDRY: WITH SUPERVISION
DRESS: INDEPENDENT

## 2020-03-06 NOTE — PROCEDURES
[facility] ECT Procedure Note     Misty Parham [id number]   34 year old 1985     Patient Status: Inpatient    No Known Allergies    Weight:  219 lbs 12.8 oz    Patient Preparations: Glasses/Contacts removed         Diagnosis:   Major depression     Medications ineffective, Deteriorating fluid/electrolyte/nutritional status, History of good ECT response in one or more previous episodes of illness and currently receiving maintenance  ECT per lucas truong order 2X per week for duration of committment       Pause for the Cause:     Right patient Yes   Right procedure/laterality settings: Yes   Right diagnosis Yes          Intra-Procedure Documentation:     Date:  3/6/2020  Time:  7:10 AM    ECT #    Treatment number this series: 4   Total treatment number: 4   Type of ECT:  Bilateral, standard    ECT Medications administered: Brevital: 100mg  Succinyl Choline: 100mg         Clinical Narrative:     ECT was administered by Thymatron machine.  Pt has been medically cleared for procedure, consent not needed as ECT tx is being done under court order. Side effects, Risks and benefits reviewed. Misty is IP, receiving ECT under price truong order, 2 maintainence ECT tx per week for duration of committment M/W schedule  ECT Strip Summary:   Energy Level: 65 percent  Motor Seizure Duration: 32 seconds  EEG Seizure Duration:  45 seconds    Complications: No    Plan: 4th ECT 3/9/20 per price truong order for duration of comittment    Reynaldo Kwon MD

## 2020-03-06 NOTE — PLAN OF CARE
Problem: Adult Behavioral Health Plan of Care  Goal: Patient-Specific Goal (Individualization)  Description  1. Absence of suicidal ideation with safety plan in place.  2. Effective medication regime with patient compliance.  3. Stabilization of mood and thought processes.  4. Improved insight into mental health issues.  5. Able to identify and utilize positive coping skills.  6. Plan in place for ongoing treatment and support.     Outcome: No Change  Note:   Pt has been isolative to her room. Pt has been feeling less depressed and less suicidal. Pt feels a little light headed and encouraged to drink fluids. Pt was able to take about 300cc with staff. Pt had 4th ECT today. Continue with ECT tx twice weekly. Nursing to continue to monitor.

## 2020-03-06 NOTE — ANESTHESIA POSTPROCEDURE EVALUATION
Patient: Misty Parham    * No procedures listed *    Diagnosis:* No pre-op diagnosis entered *  Diagnosis Additional Information: No value filed.    Anesthesia Type:  General    Note:  Anesthesia Post Evaluation    Patient location during evaluation: bedside  Patient participation: Able to fully participate in evaluation  Level of consciousness: awake  Pain management: adequate  Airway patency: patent  Cardiovascular status: acceptable  Respiratory status: acceptable  Hydration status: acceptable  PONV: none     Anesthetic complications: None    Comments: No anesthetic complications noted.         Last vitals:  Vitals:    03/06/20 0750 03/06/20 0755 03/06/20 0800   BP: 103/55  124/86   Pulse: 90  94   Resp: 18 15 16   Temp:   37.1  C (98.7  F)   SpO2: 96% 98% 98%         Electronically Signed By: Jagdish Mohamud DO, DO  March 6, 2020  3:47 PM

## 2020-03-06 NOTE — PROGRESS NOTES
Business , Herbert, met with pt yesterday.   He assisted her with MA application.  It's not quite completed yet because Herbert needs more info from patient.   Herbert expects it will be completed and submitted around March 10th or 11th.   He said she appears to be a good candidate for eligibility for MA.

## 2020-03-06 NOTE — PLAN OF CARE
"Isolative , quiet and withdrawn. Depressed mood , passive and flat affect. States \" I feel down \". Stayed in room and bed rested , sometimes reading book. Thought process fairly organized but slow. Insight is fair. She is reluctant to eat and drink but will do so if staff member is present and actively encourages her.   "

## 2020-03-06 NOTE — ANESTHESIA PREPROCEDURE EVALUATION
Anesthesia Pre-Procedure Evaluation    Patient: Misty Parham   MRN: [id number] : 1985          Preoperative Diagnosis: * No pre-op diagnosis entered *    * No procedures listed *    Past Medical History:   Diagnosis Date     Depressive disorder      Past Surgical History:   Procedure Laterality Date     CHOLECYSTECTOMY         Anesthesia Evaluation     . Pt has had prior anesthetic. Type: General    No history of anesthetic complications          ROS/MED HX    ENT/Pulmonary:      (-) tobacco use and sleep apnea   Neurologic:      (-) Delerium   Cardiovascular:     (+) ----. : . . . :. . Previous cardiac testing date:results:date: results:ECG reviewed date:2020 results:NSR date: results:          METS/Exercise Tolerance:  >4 METS   Hematologic:         Musculoskeletal:         GI/Hepatic:        (-) GERD   Renal/Genitourinary:         Endo:     (+) Obesity (BMI 37), .      Psychiatric:     (+) psychiatric history (severe, requring ECT) depression      Infectious Disease:         Malignancy:         Other:    (+) No chance of pregnancy                           Physical Exam  Normal systems: dental    Airway   Mallampati: II  TM distance: >3 FB  Neck ROM: full    Dental     Cardiovascular   Rhythm and rate: regular and normal      Pulmonary             Lab Results   Component Value Date    WBC 9.2 2020    HGB 12.7 2020    HCT 39.1 2020     2020     2020    POTASSIUM 3.4 2020    CHLORIDE 104 2020    CO2 26 2020    BUN 13 2020    CR 0.87 2020    GLC 92 2020    CRISTINE 9.1 2020    ALBUMIN 3.8 2020    PROTTOTAL 7.6 2020    ALT 15 2020    AST 13 2020    ALKPHOS 78 2020    BILITOTAL 0.4 2020    TSH 1.08 2020    HCG Negative 2020    HCGS Negative 2020       Preop Vitals  BP Readings from Last 3 Encounters:   20 107/66   20 120/88   20 114/83    Pulse Readings from  "Last 3 Encounters:   03/06/20 92   02/21/20 109   02/18/20 95      Resp Readings from Last 3 Encounters:   03/06/20 12   02/21/20 16   02/18/20 16    SpO2 Readings from Last 3 Encounters:   03/06/20 98%   02/21/20 97%   02/18/20 99%      Temp Readings from Last 1 Encounters:   03/06/20 36.7  C (98  F) (Temporal)    Ht Readings from Last 1 Encounters:   02/21/20 1.651 m (5' 5\")      Wt Readings from Last 1 Encounters:   03/03/20 99.7 kg (219 lb 12.8 oz)    Estimated body mass index is 36.58 kg/m  as calculated from the following:    Height as of 2/21/20: 1.651 m (5' 5\").    Weight as of 3/3/20: 99.7 kg (219 lb 12.8 oz).       Anesthesia Plan      History & Physical Review  History and physical reviewed and following examination; no interval change.    ASA Status:  2 .    NPO Status:  > 8 hours    Plan for General          Postoperative Care      Consents  Anesthetic plan, risks, benefits and alternatives discussed with:  Patient..                   Jagdish Mohamud, DO, DO  "

## 2020-03-06 NOTE — PROGRESS NOTES
"[facility]  Psychiatric Progress Note    Length of stay (days): 14        Interim History:   The patient's care was discussed with the treatment team during the daily team meeting and/or staff's chart notes were reviewed.  Staff report: No acute issues overnight.  She has been eating meals regularly.    Depression severity scale 0-10 (10=most severe):  Today: 8    Her mood is reported to be slightly better today after ECT.  She has noticed a trend that highlights mood improvements on the day of ECT.    She continues to endorse suicidal ideation, helplessness and hopelessness.    She reports ability to maintain safety in the hospital setting however is not able to ensure her own safety if discharged home today.    No homicidal thoughts reported.  No psychotic symptoms reported.    Energy is low, appetite is low, concentration is poor, anhedonia is moderate.    Tolerating medications without side effects.           Medications:       brexpiprazole  3 mg Oral Daily     lamoTRIgine  300 mg Oral Daily     lidocaine   Transdermal Q8H     mirtazapine  45 mg Oral At Bedtime     ramelteon  8 mg Oral At Bedtime     venlafaxine  225 mg Oral Daily with breakfast          Allergies:   No Known Allergies       Labs:     No results found for this or any previous visit (from the past 24 hour(s)).       Psychiatric Examination:     /86   Pulse 94   Temp 98.7  F (37.1  C) (Oral)   Resp 16   Ht 1.651 m (5' 5\")   Wt 99.7 kg (219 lb 12.8 oz)   SpO2 98%   BMI 36.58 kg/m    Weight is 219 lbs 12.8 oz  Body mass index is 36.58 kg/m .  Orthostatic Vitals     None      Appearance: awake, alert  Attitude:  cooperative  Eye Contact:  fair  Mood:  depressed  Affect:  intensity is blunted  Speech:  decreased prosody  Psychomotor Behavior:  no evidence of tardive dyskinesia, dystonia, or tics  Throught Process:  linear  Associations:  no loose associations  Thought Content:  no evidence of psychotic thought and active " suicidal ideation present  Insight:  partial  Judgement:  fair  Oriented to:  time, person, and place  Attention Span and Concentration:  fair  Recent and Remote Memory:  intact          Precautions:     Behavioral Orders   Procedures     Code 1 - Restrict to Unit     Code 2 - 1:1 Staff Supervision     For ECT only     Code 2 - 1:1 Staff Supervision     For ECT only     Electroconvulsive therapy     Series of up to 12 treatments. Begin Date: 2/26/20   Treating Psychiatrist providing ECT: Doug Devi MD   Notified on: 2/25/20     Electroconvulsive therapy     Series of up to 12 treatments. Begin Date: 2/26/20   Treating Psychiatrist providing ECT: Doug Devi MD   Notified on: 2/25/20     Fall precautions     Fall precautions     Routine Programming     As clinically indicated     Status 15     Every 15 minutes.          DIagnoses:     Major depressive disorder-recurrent, severe  Generalized anxiety disorder  Unspecified personality disorder       Plan:     Continue her current medications which include a combination of Effexor and Remeron to address her depressive and anxiety disorders.    Rexulti has been resumed for augmentation of her antidepressants.    Lamotrigine has been resumed for management of affective instability.    Treatment with ECT has been resumed while under a [facility].  Treatment notes reviewed and treatment continues to proceed without complications.  The patient is currently allowed treatment twice a week under her current [facility] order which will be scheduled on Mondays and Fridays.  Documentation has been submitted to the court requesting permission to utilize ECT 3 times a week.    Psychosocial treatments to be addressed with social work consult and groups.    -Once she is agreeable, neuropsychological testing may be helpful to identify any underlying personality characteristics that may be complicating her treatment course.  -Educated on [place] and  encouraged to inform her family of this resource  -Patient will be applying for medical assistance    Legal Status: full commitment with [facility]en    Disposition: Anticipate transitioning into an intensive residential treatment program after achieving adequate improvement in mood and remission of suicidal thoughts.

## 2020-03-06 NOTE — PROGRESS NOTES
"[facility]  Psychiatric Progress Note    Length of stay (days): 13        Interim History:   The patient's care was discussed with the treatment team during the daily team meeting and/or staff's chart notes were reviewed.  Staff report: No acute issues overnight.  Food intake has been improving.  She ate breakfast this morning.    Depression severity scale 0-10 (10=most severe):  Today: 9    She continues to report severe depressed mood.    She continues to endorse suicidal ideation, helplessness and hopelessness.    She reports ability to maintain safety in the hospital setting however is not able to ensure her own safety if discharged home today.    No homicidal thoughts reported.  No psychotic symptoms reported.    Energy is low, appetite is low, concentration is poor, anhedonia is moderate.    Tolerating medications without side effects.           Medications:       brexpiprazole  3 mg Oral Daily     lamoTRIgine  300 mg Oral Daily     mirtazapine  45 mg Oral At Bedtime     venlafaxine  225 mg Oral Daily with breakfast          Allergies:   No Known Allergies       Labs:     No results found for this or any previous visit (from the past 24 hour(s)).       Psychiatric Examination:     /73   Pulse 72   Temp 98.1  F (36.7  C) (Oral)   Resp 16   Ht 1.651 m (5' 5\")   Wt 99.7 kg (219 lb 12.8 oz)   SpO2 99%   BMI 36.58 kg/m    Weight is 219 lbs 12.8 oz  Body mass index is 36.58 kg/m .  Orthostatic Vitals     None            Appearance: awake, alert  Attitude:  cooperative  Eye Contact:  fair  Mood:  depressed  Affect:  intensity is blunted  Speech:  decreased prosody  Psychomotor Behavior:  no evidence of tardive dyskinesia, dystonia, or tics  Throught Process:  linear  Associations:  no loose associations  Thought Content:  no evidence of psychotic thought and active suicidal ideation present  Insight:  partial  Judgement:  fair  Oriented to:  time, person, and place  Attention Span and " Concentration:  fair  Recent and Remote Memory:  intact    Clinical Global Impressions  First:     Most recent:            Precautions:     Behavioral Orders   Procedures     Code 1 - Restrict to Unit     Code 2 - 1:1 Staff Supervision     For ECT only     Code 2 - 1:1 Staff Supervision     For ECT only     Electroconvulsive therapy     Series of up to 12 treatments. Begin Date: 2/26/20   Treating Psychiatrist providing ECT: Doug Devi MD   Notified on: 2/25/20     Electroconvulsive therapy     Series of up to 12 treatments. Begin Date: 2/26/20   Treating Psychiatrist providing ECT: Doug Devi MD   Notified on: 2/25/20     Fall precautions     Fall precautions     Routine Programming     As clinically indicated     Status 15     Every 15 minutes.          DIagnoses:     Major depressive disorder-recurrent, severe  Generalized anxiety disorder  Unspecified personality disorder         Plan:     Continue her current medications which include a combination of Effexor and Remeron to address her depressive and anxiety disorders.  Rexulti has been resumed for augmentation of her antidepressants.  Lamotrigine has been resumed for management of affective instability.    Treatment with ECT has been resumed while under a [facility]en.  Treatment notes reviewed and treatment continues to proceed without complications.  The patient is currently allowed treatment twice a week under her current [facility]en order which will be scheduled on Mondays and Fridays.  Documentation has been submitted to the court requesting permission to utilize ECT 3 times a week.    Psychosocial treatments to be addressed with social work consult and groups.    -Once she is agreeable, neuropsychological testing may be helpful to identify any underlying personality characteristics that may be complicating her treatment course.  -Educated on [place] and encouraged to inform her family of this resource  -Anticipate helping  the patient apply for medical assistance  -She has decided to resign from her place of employment.    Legal Status: full commitment with [employer]en    Disposition: Once she has demonstrated adequate improvement in her mood and remission of suicidal thoughts, we will plan to pursue intensive residential treatment services.

## 2020-03-07 PROCEDURE — 25000132 ZZH RX MED GY IP 250 OP 250 PS 637: Performed by: PSYCHIATRY & NEUROLOGY

## 2020-03-07 PROCEDURE — 12400000 ZZH R&B MH

## 2020-03-07 RX ADMIN — MIRTAZAPINE 45 MG: 45 TABLET, FILM COATED ORAL at 20:34

## 2020-03-07 RX ADMIN — RAMELTEON 8 MG: 8 TABLET ORAL at 20:34

## 2020-03-07 RX ADMIN — VENLAFAXINE HYDROCHLORIDE 225 MG: 150 CAPSULE, EXTENDED RELEASE ORAL at 09:11

## 2020-03-07 RX ADMIN — BREXPIPRAZOLE 3 MG: 1 TABLET ORAL at 09:11

## 2020-03-07 RX ADMIN — LAMOTRIGINE 300 MG: 100 TABLET ORAL at 09:11

## 2020-03-07 NOTE — PLAN OF CARE
Pt mood is depressed and affect is flat. Thought process is poor. Insight is poor. Pt endorses chronic thoughts of suicidal ideation but contracts for safety. Pt had poor appetite at dinner but with prompting had larger snack and fluids. Pt had reported light headedness early in the shift but with food and fluids she stated that she felt better. Pt was isolative and withdrawn, and she spent the shift in her room reading and napping.

## 2020-03-07 NOTE — PLAN OF CARE
"  Problem: Depressive Symptoms  Goal: Depressive Symptoms  Description  Signs and symptoms of listed problems will be absent or manageable.  Suicidal ideation,   Depression.   Outcome: No Change  Flowsheets  Taken 3/2/2020 1356 by Robert Shaver  Depressive Symptoms Assessed: all  Taken 3/3/2020 2124 by Ivana Serrano  Depressive Symptoms Present: affect;mood;suicidality;thought process;insight  Note:   Pt presents with a depressed mood and a flat affect. Pt thought process and insight is poor. Pt is withdrawn to room for almost the whole shift. Pts mom and step dad visited, pt states it went well and they \"try to be supportive\". Pt ate entire breakfast, only ate a fruit cup and juice for lunch. Pt endorses chronic Si and hopelessness.     "

## 2020-03-08 PROCEDURE — 25000132 ZZH RX MED GY IP 250 OP 250 PS 637: Performed by: PSYCHIATRY & NEUROLOGY

## 2020-03-08 PROCEDURE — 12400000 ZZH R&B MH

## 2020-03-08 RX ADMIN — VENLAFAXINE HYDROCHLORIDE 225 MG: 150 CAPSULE, EXTENDED RELEASE ORAL at 09:19

## 2020-03-08 RX ADMIN — RAMELTEON 8 MG: 8 TABLET ORAL at 20:38

## 2020-03-08 RX ADMIN — BREXPIPRAZOLE 3 MG: 1 TABLET ORAL at 09:19

## 2020-03-08 RX ADMIN — MIRTAZAPINE 45 MG: 45 TABLET, FILM COATED ORAL at 20:38

## 2020-03-08 RX ADMIN — LAMOTRIGINE 300 MG: 100 TABLET ORAL at 09:19

## 2020-03-08 NOTE — PLAN OF CARE
Pt. Presents with flat affect. Isolative to room. Declined to come out of room  for groups or meals. Reports that she is having thoughts of suicide. Plan would be to attempt to starve herself. Staff sat with her during dinner-ate only cheese, hot chocolate and a vitamin water. Reports that she feels improved on ECT days only. Writer brought up subject of going to an [facility]-patient only minimally responsive.

## 2020-03-08 NOTE — PLAN OF CARE
Pt presents with sad mood and flat, depressed affect. Isolative to room majority of shift. Behavior is appropriate with peers and staff. Ate 75% of breakfast in lounge. States that she wants to die of starvation but has been eating well this shift. States ECT has been going well and helping her mood. Denies and auditory or visual hallucinations. Medication compliant.

## 2020-03-09 ENCOUNTER — ANESTHESIA (OUTPATIENT)
Dept: SURGERY | Facility: CLINIC | Age: 35
End: 2020-03-09

## 2020-03-09 ENCOUNTER — ANESTHESIA EVENT (OUTPATIENT)
Dept: SURGERY | Facility: CLINIC | Age: 35
End: 2020-03-09

## 2020-03-09 ENCOUNTER — APPOINTMENT (OUTPATIENT)
Dept: SURGERY | Facility: CLINIC | Age: 35
End: 2020-03-09
Payer: COMMERCIAL

## 2020-03-09 PROCEDURE — 25000132 ZZH RX MED GY IP 250 OP 250 PS 637: Performed by: PSYCHIATRY & NEUROLOGY

## 2020-03-09 PROCEDURE — 25000125 ZZHC RX 250: Performed by: ANESTHESIOLOGY

## 2020-03-09 PROCEDURE — 40000671 ZZH STATISTIC ANESTHESIA CASE

## 2020-03-09 PROCEDURE — 12400000 ZZH R&B MH

## 2020-03-09 PROCEDURE — 25000566 ZZH SEVOFLURANE, EA 15 MIN

## 2020-03-09 PROCEDURE — 25000128 H RX IP 250 OP 636: Performed by: NURSE ANESTHETIST, CERTIFIED REGISTERED

## 2020-03-09 PROCEDURE — 90870 ELECTROCONVULSIVE THERAPY: CPT

## 2020-03-09 PROCEDURE — 25800030 ZZH RX IP 258 OP 636: Performed by: ANESTHESIOLOGY

## 2020-03-09 PROCEDURE — 25000125 ZZHC RX 250: Performed by: NURSE ANESTHETIST, CERTIFIED REGISTERED

## 2020-03-09 RX ORDER — SODIUM CHLORIDE, SODIUM LACTATE, POTASSIUM CHLORIDE, CALCIUM CHLORIDE 600; 310; 30; 20 MG/100ML; MG/100ML; MG/100ML; MG/100ML
500 INJECTION, SOLUTION INTRAVENOUS CONTINUOUS
Status: DISCONTINUED | OUTPATIENT
Start: 2020-03-09 | End: 2020-03-09

## 2020-03-09 RX ADMIN — RAMELTEON 8 MG: 8 TABLET ORAL at 21:26

## 2020-03-09 RX ADMIN — SUCCINYLCHOLINE CHLORIDE 100 MG: 20 INJECTION, SOLUTION INTRAMUSCULAR; INTRAVENOUS; PARENTERAL at 06:46

## 2020-03-09 RX ADMIN — SODIUM CHLORIDE, POTASSIUM CHLORIDE, SODIUM LACTATE AND CALCIUM CHLORIDE: 600; 310; 30; 20 INJECTION, SOLUTION INTRAVENOUS at 06:27

## 2020-03-09 RX ADMIN — VENLAFAXINE HYDROCHLORIDE 225 MG: 150 CAPSULE, EXTENDED RELEASE ORAL at 09:09

## 2020-03-09 RX ADMIN — BREXPIPRAZOLE 3 MG: 1 TABLET ORAL at 09:09

## 2020-03-09 RX ADMIN — METHOHEXITAL SODIUM 100 MG: 500 INJECTION, POWDER, LYOPHILIZED, FOR SOLUTION INTRAMUSCULAR; INTRAVENOUS; RECTAL at 06:46

## 2020-03-09 RX ADMIN — MIRTAZAPINE 45 MG: 45 TABLET, FILM COATED ORAL at 21:26

## 2020-03-09 RX ADMIN — LIDOCAINE HYDROCHLORIDE 1 ML: 10 INJECTION, SOLUTION EPIDURAL; INFILTRATION; INTRACAUDAL; PERINEURAL at 06:27

## 2020-03-09 RX ADMIN — LAMOTRIGINE 300 MG: 100 TABLET ORAL at 09:09

## 2020-03-09 ASSESSMENT — ACTIVITIES OF DAILY LIVING (ADL)
ORAL_HYGIENE: INDEPENDENT
HYGIENE/GROOMING: INDEPENDENT
DRESS: INDEPENDENT

## 2020-03-09 ASSESSMENT — LIFESTYLE VARIABLES: TOBACCO_USE: 0

## 2020-03-09 NOTE — ANESTHESIA PREPROCEDURE EVALUATION
Anesthesia Pre-Procedure Evaluation    Patient: Misty Parham   MRN: [id number] : 1985          Preoperative Diagnosis: * No pre-op diagnosis entered *    * No procedures listed *    Past Medical History:   Diagnosis Date     Depressive disorder      Past Surgical History:   Procedure Laterality Date     CHOLECYSTECTOMY         Anesthesia Evaluation     . Pt has had prior anesthetic. Type: General    No history of anesthetic complications          ROS/MED HX    ENT/Pulmonary:      (-) tobacco use and sleep apnea   Neurologic:      (-) Delerium   Cardiovascular:     (+) ----. : . . . :. . Previous cardiac testing date:results:date: results:ECG reviewed date:2020 results:NSR date: results:          METS/Exercise Tolerance:  >4 METS   Hematologic:         Musculoskeletal:         GI/Hepatic:        (-) GERD   Renal/Genitourinary:         Endo:     (+) Obesity (BMI 37), .      Psychiatric:     (+) psychiatric history (severe, requring ECT) depression      Infectious Disease:         Malignancy:         Other:    (+) No chance of pregnancy                           Physical Exam  Normal systems: dental    Airway   Mallampati: II  TM distance: >3 FB  Neck ROM: full    Dental     Cardiovascular   Rhythm and rate: regular and normal      Pulmonary             Lab Results   Component Value Date    WBC 9.2 2020    HGB 12.7 2020    HCT 39.1 2020     2020     2020    POTASSIUM 3.4 2020    CHLORIDE 104 2020    CO2 26 2020    BUN 13 2020    CR 0.87 2020    GLC 92 2020    CRISTINE 9.1 2020    ALBUMIN 3.8 2020    PROTTOTAL 7.6 2020    ALT 15 2020    AST 13 2020    ALKPHOS 78 2020    BILITOTAL 0.4 2020    TSH 1.08 2020    HCG Negative 2020    HCGS Negative 2020       Preop Vitals  BP Readings from Last 3 Encounters:   20 116/81   20 120/88   20 114/83    Pulse Readings from  "Last 3 Encounters:   03/08/20 85   02/21/20 109   02/18/20 95      Resp Readings from Last 3 Encounters:   03/09/20 16   02/21/20 16   02/18/20 16    SpO2 Readings from Last 3 Encounters:   03/09/20 98%   02/21/20 97%   02/18/20 99%      Temp Readings from Last 1 Encounters:   03/09/20 36.9  C (98.5  F) (Oral)    Ht Readings from Last 1 Encounters:   02/21/20 1.651 m (5' 5\")      Wt Readings from Last 1 Encounters:   03/03/20 99.7 kg (219 lb 12.8 oz)    Estimated body mass index is 36.58 kg/m  as calculated from the following:    Height as of 2/21/20: 1.651 m (5' 5\").    Weight as of 3/3/20: 99.7 kg (219 lb 12.8 oz).       Anesthesia Plan      History & Physical Review  History and physical reviewed and following examination; no interval change.    ASA Status:  2 .    NPO Status:  > 8 hours    Plan for General            Postoperative Care      Consents  Anesthetic plan, risks, benefits and alternatives discussed with:  Patient..                   Elvis Kate MD  "

## 2020-03-09 NOTE — PROGRESS NOTES
"[facility]  Psychiatric Progress Note    Length of stay (days): 17        Interim History:   The patient's care was assumed by the undersigned today because of the absence of the patient's attending psychiatrist.  Her case was discussed with the treatment team during the daily team meeting and/or staff's chart notes were reviewed.  Staff report: No acute issues overnight.  She has been eating meals regularly.    Depression severity scale 0-10 (10=most severe):  Today: 8    Her mood is reported to be slightly better today after ECT.  She reports slight improvement in her mood and function but appears to be cautiously optimistic with respect to her response to treatment.  She does not endorse any specific suicidal ideations, plans or intent but continues to express feelings of helplessness and hopelessness.    She reports ability to maintain safety in the hospital setting however is not able to ensure her own safety if discharged home today.    No homicidal thoughts reported.  No psychotic symptoms reported.    Energy is low, appetite is low, concentration is poor, anhedonia is moderate.    Tolerating medications without side effects.           Medications:       brexpiprazole  3 mg Oral Daily     lamoTRIgine  300 mg Oral Daily     mirtazapine  45 mg Oral At Bedtime     ramelteon  8 mg Oral At Bedtime     venlafaxine  225 mg Oral Daily with breakfast          Allergies:   No Known Allergies       Labs:     No results found for this or any previous visit (from the past 24 hour(s)).       Psychiatric Examination:     BP 95/68 (BP Location: Right arm)   Pulse 92   Temp (!) 49.6  F (9.8  C) (Oral)   Resp 16   Ht 1.651 m (5' 5\")   Wt 99.7 kg (219 lb 12.8 oz)   SpO2 99%   BMI 36.58 kg/m    Weight is 219 lbs 12.8 oz  Body mass index is 36.58 kg/m .  Orthostatic Vitals     None      Appearance: awake, alert, adequately groomed, dressed in hospital scrubs and appeared older than stated age  Attitude:  " cooperative  Eye Contact:  fair  Mood:  sad  and depressed  Affect:  intensity is blunted and Brightens with interaction  Speech:  decreased prosody  Psychomotor Behavior:  no evidence of tardive dyskinesia, dystonia, or tics  Throught Process:  linear  Associations:  no loose associations  Thought Content:  no evidence of psychotic thought and active suicidal ideation present  Insight:  partial  Judgement:  fair  Oriented to:  time, person, and place  Attention Span and Concentration:  fair  Recent and Remote Memory:  intact          Precautions:     Behavioral Orders   Procedures     Code 1 - Restrict to Unit     Code 2 - 1:1 Staff Supervision     For ECT only     Code 2 - 1:1 Staff Supervision     For ECT only     Electroconvulsive therapy     Series of up to 12 treatments. Begin Date: 2/26/20   Treating Psychiatrist providing ECT: Doug Devi MD   Notified on: 2/25/20     Electroconvulsive therapy     Series of up to 12 treatments. Begin Date: 2/26/20   Treating Psychiatrist providing ECT: Doug Devi MD   Notified on: 2/25/20     Electroconvulsive therapy     Series of up to 12 treatments. Begin Date: 2/26/20   Treating Psychiatrist providing ECT: Doug Devi MD   Notified on: 2/25/20     Fall precautions     Fall precautions     Routine Programming     As clinically indicated     Status 15     Every 15 minutes.          DIagnoses:     Major depressive disorder-recurrent, severe  Generalized anxiety disorder  Unspecified personality disorder       Plan:     Continue her current medications without changes.    Treatment with ECT has been resumed while under a [facility].  Treatment notes reviewed and treatment continues to proceed without complications.  The patient is currently allowed treatment twice a week under her current [facility] order which will be scheduled on Mondays and Fridays.  Documentation has been submitted to the court requesting permission to  utilize ECT 3 times a week.    Psychosocial treatments to be addressed with social work consult and groups.    -Once she is agreeable, neuropsychological testing may be helpful to identify any underlying personality characteristics that may be complicating her treatment course.  -Educated on [place] and encouraged to inform her family of this resource  -Patient will be applying for medical assistance    Legal Status: full commitment with [facility]    Disposition: Anticipate transitioning into an intensive residential treatment program after achieving adequate improvement in mood and remission of suicidal thoughts.

## 2020-03-09 NOTE — PROCEDURES
[facility] ECT Procedure Note     Misty Parham [id number]   34 year old 1985     Patient Status: Inpatient    No Known Allergies    Weight:  219 lbs 12.8 oz    Patient Preparations: Glasses/Contacts removed         Diagnosis:   Major depression     Medications ineffective, Deteriorating fluid/electrolyte/nutritional status, History of good ECT response in one or more previous episodes of illness and currently receiving maintenance  ECT per lucas [facility]uong order 2X per week for duration of committment       Pause for the Cause:     Right patient Yes   Right procedure/laterality settings: Yes   Right diagnosis Yes          Intra-Procedure Documentation:     Date:  3/9/2020  Time:  7:10 AM    ECT #    Treatment number this series: 5   Total treatment number: 5   Type of ECT:  Bilateral, standard    ECT Medications administered: Brevital: 100mg  Succinyl Choline: 100mg         Clinical Narrative:     ECT was administered by Thymatron machine.  Pt has been medically cleared for procedure, consent not needed as ECT tx is being done under court order. Side effects, Risks and benefits reviewed. Misty is IP, receiving ECT under price truong order, 2 maintainence ECT tx per week for duration of committment M/W schedule  ECT Strip Summary:   Energy Level: 70 percent  Motor Seizure Duration: 32 seconds  EEG Seizure Duration:  45 seconds    Complications: No    Plan: 5th ECT 3/13/20 per price truong order for duration of comittment    Reynaldo Kwon MD

## 2020-03-09 NOTE — PLAN OF CARE
Problem: Depressive Symptoms  Goal: Depressive Symptoms  Description: Signs and symptoms of listed problems will be absent or manageable.  Suicidal ideation,   Depression.   Outcome: Improving  Flowsheets (Taken 3/9/2020 4169)  Depressive Symptoms Assessed: all  Depressive Symptoms Present:   affect   thought process   mood   anxiety   insight  Note: Patient denies suicidal ideation today. She stated that the ECT helps for a couple of days and then she becomes more depressed.  She has spent the day in bed except to eat. Had breakfast and had cocoa and fruit for lunch. States that she did not sleep well last night as her mind was spinning.

## 2020-03-09 NOTE — PROGRESS NOTES
Pt reported dizziness and 5/10 head pain after ECT. Fluids and snacks encouraged.  Pt denies SOB, N&V, GI symptoms.     Breath sounds heard equal bilaterally. S1S2 present. Apical pulse 98. Radial pulses present. Cap refill <3 sec.

## 2020-03-09 NOTE — PROGRESS NOTES
Psych Asst notified RN that pt complained of dizziness and had a low BP.  BP at 1550 95/67 HR 71 SPO2 98.  Pt reported that she had been feeling dizzy all day today since ECT.  Described it as an internal dizziness, as though she was spinning inside.   Fluids promoted.  Reports eating minimally today, snacks encouraged.  Attempted to obtain ortho BPs at 1555 Lying /74 , Sitting BP 92/65 HR 96, unable to obtain standing BP, .  Pt was able to sit up and stand independently, standby assistance was provided. Educated on bedside call light and to call for assistance before ambulating.  Will notify  Bedside RN.

## 2020-03-09 NOTE — ANESTHESIA POSTPROCEDURE EVALUATION
Patient: Misty Parham    * No procedures listed *    Diagnosis:* No pre-op diagnosis entered *  Diagnosis Additional Information: No value filed.    Anesthesia Type:  General    Note:  Anesthesia Post Evaluation    Patient location during evaluation: PACU  Patient participation: Able to fully participate in evaluation  Level of consciousness: awake and alert  Pain management: satisfactory to patient  Airway patency: patent  Cardiovascular status: hemodynamically stable  Respiratory status: acceptable and unassisted  Hydration status: balanced  PONV: none     Anesthetic complications: None          Last vitals:  Vitals:    03/09/20 0710 03/09/20 0715 03/09/20 0720   BP: 103/79 111/60 111/60   Pulse: 90 87    Resp: 13 16 19   Temp:      SpO2: 92% 93% 95%         Electronically Signed By: Elvis Kate MD  March 9, 2020  7:22 AM

## 2020-03-09 NOTE — PLAN OF CARE
BEHAVIORAL TEAM DISCUSSION    Participants: MD, CM, RN, OT  Progress:  depressed, dizzy  Anticipated length of stay:unknown  Continued Stay Criteria/Rationale:Continue ECT, pursuing [facility] to adjust ECT schedule  Medical/Physical:  Dizzy  Precautions:   Behavioral Orders   Procedures    Code 1 - Restrict to Unit    Code 2 - 1:1 Staff Supervision     For ECT only    Code 2 - 1:1 Staff Supervision     For ECT only    Electroconvulsive therapy     Series of up to 12 treatments. Begin Date: 2/26/20   Treating Psychiatrist providing ECT: Doug Devi MD   Notified on: 2/25/20    Electroconvulsive therapy     Series of up to 12 treatments. Begin Date: 2/26/20   Treating Psychiatrist providing ECT: Doug Devi MD   Notified on: 2/25/20    Electroconvulsive therapy     Series of up to 12 treatments. Begin Date: 2/26/20   Treating Psychiatrist providing ECT: Doug Devi MD   Notified on: 2/25/20    Fall precautions    Fall precautions    Routine Programming     As clinically indicated    Status 15     Every 15 minutes.     Plan: pursue ECT, encourage group attendance, medication management  Rationale for change in precautions or plan: NA

## 2020-03-09 NOTE — PLAN OF CARE
Pt presents with sad mood and flat, depressed affect. Pt spent majority of the shift resting in bed. Pt drank fluids but did not want to eat anything more than a cheese stick for dinner, states she wants to die by starvation, but does say ECT has helped her mood. Denies and auditory or visual hallucinations. Med compliant.

## 2020-03-10 PROCEDURE — 25000132 ZZH RX MED GY IP 250 OP 250 PS 637: Performed by: PSYCHIATRY & NEUROLOGY

## 2020-03-10 PROCEDURE — 12400000 ZZH R&B MH

## 2020-03-10 PROCEDURE — G0177 OPPS/PHP; TRAIN & EDUC SERV: HCPCS

## 2020-03-10 RX ADMIN — MIRTAZAPINE 45 MG: 45 TABLET, FILM COATED ORAL at 20:40

## 2020-03-10 RX ADMIN — RAMELTEON 8 MG: 8 TABLET ORAL at 20:40

## 2020-03-10 RX ADMIN — VENLAFAXINE HYDROCHLORIDE 225 MG: 150 CAPSULE, EXTENDED RELEASE ORAL at 08:38

## 2020-03-10 RX ADMIN — BREXPIPRAZOLE 3 MG: 1 TABLET ORAL at 08:38

## 2020-03-10 RX ADMIN — LAMOTRIGINE 300 MG: 100 TABLET ORAL at 08:38

## 2020-03-10 ASSESSMENT — ACTIVITIES OF DAILY LIVING (ADL)
LAUNDRY: WITH SUPERVISION
LAUNDRY: WITH SUPERVISION
DRESS: INDEPENDENT
DRESS: SCRUBS (BEHAVIORAL HEALTH)
HYGIENE/GROOMING: INDEPENDENT
ORAL_HYGIENE: INDEPENDENT
ORAL_HYGIENE: INDEPENDENT
HYGIENE/GROOMING: INDEPENDENT

## 2020-03-10 ASSESSMENT — MIFFLIN-ST. JEOR: SCORE: 1725.08

## 2020-03-10 NOTE — PROGRESS NOTES
Left a voice mail for patient's , Samreen Machuca [phone] to inquire if she knows anything about the status of patient's job.  I spoke with patient about this and she stated she did not know if she still had a job.      Samreen called and stated she does not know if patient is employed.  Patient will have to contact her employer herself to determine if she is still employed.

## 2020-03-10 NOTE — PLAN OF CARE
Problem: Depressive Symptoms  Goal: Depressive Symptoms  Description: Signs and symptoms of listed problems will be absent or manageable.  Suicidal ideation,   Depression.   Outcome: Improving  Flowsheets (Taken 3/10/2020 1350)  Depressive Symptoms Assessed: all  Depressive Symptoms Present:   anxiety   insight   suicidality   affect   mood   psychomotor activity  Note: Patient is suicidal and states her plan is to starve herself to death.  She has spent the day in bed and needs to be encouraged to eat. She ate breakfast and lunch with a staff nearby watching that she does eat.  Her affect is flat and her mood is depressed. She states she did not sleep well as she had things on her mind.

## 2020-03-10 NOTE — PLAN OF CARE
Pt reports a wish to die but denies current SI thoughts or SIB behaviors. Her goal for the day was to talk to her children which she says she did. At the beginning of the evening shift, pt's blood pressure was taken by PA and nursing staff as pt reported feeling dizzy. Encouraged pt to drink fluids. During the evening shift pt was mostly isolative to her room.

## 2020-03-10 NOTE — PROGRESS NOTES
"[facility]  Psychiatric Progress Note    Length of stay (days): 18        Interim History:   The patient's care was discussed with the treatment team during the daily team meeting and/or staff's chart notes were reviewed.  Staff report: No acute issues overnight.  She has been eating meals regularly.    Depression severity scale 0-10 (10=most severe):  Today: 8    Patient reports that she is still experiencing intermittent feelings of hopelessness with passive self-harm thoughts of overdosing on pills which she states she does not want to do.  She states she misses her children and is hoping that someday in the future she will be able to have a home with her children and  again.  She states she is hoping that she can receive ECT 3 times a week as recommended by Dr. Laughlin.  She attended 1 group session today and she is hoping to do more.  She states she has been walking in the hallway for exercise.  She talks to her  on a daily basis.  She reports ability to maintain safety in the hospital setting however is not able to ensure her own safety if discharged home today.    No homicidal thoughts reported.  No psychotic symptoms reported.    Energy is low, appetite is better, concentration is fair, anhedonia is moderate.    Tolerating medications without side effects.           Medications:       brexpiprazole  3 mg Oral Daily     lamoTRIgine  300 mg Oral Daily     mirtazapine  45 mg Oral At Bedtime     ramelteon  8 mg Oral At Bedtime     venlafaxine  225 mg Oral Daily with breakfast          Allergies:   No Known Allergies       Labs:     No results found for this or any previous visit (from the past 24 hour(s)).       Psychiatric Examination:     /70   Pulse 92   Temp 98.4  F (36.9  C) (Oral)   Resp 16   Ht 1.651 m (5' 5\")   Wt 103.4 kg (228 lb)   SpO2 98%   BMI 37.94 kg/m    Weight is 228 lbs 0 oz  Body mass index is 37.94 kg/m .  Orthostatic Vitals     None      Appearance: " awake, alert, adequately groomed, dressed in hospital scrubs and appeared older than stated age  Attitude:  cooperative  Eye Contact:  fair  Mood:  sad   Affect:  intensity is blunted and Restricted range of affect  Speech:  decreased prosody  Psychomotor Behavior:  no evidence of tardive dyskinesia, dystonia, or tics  Throught Process:  linear  Associations:  no loose associations  Thought Content:  no evidence of psychotic thought and active suicidal ideation present  Insight:  partial  Judgement:  fair  Oriented to:  time, person, and place  Attention Span and Concentration:  fair  Recent and Remote Memory:  intact          Precautions:     Behavioral Orders   Procedures     Code 1 - Restrict to Unit     Code 2 - 1:1 Staff Supervision     For ECT only     Code 2 - 1:1 Staff Supervision     For ECT only     Electroconvulsive therapy     Series of up to 12 treatments. Begin Date: 2/26/20   Treating Psychiatrist providing ECT: Doug Devi MD   Notified on: 2/25/20     Electroconvulsive therapy     Series of up to 12 treatments. Begin Date: 2/26/20   Treating Psychiatrist providing ECT: Doug Devi MD   Notified on: 2/25/20     Electroconvulsive therapy     Series of up to 12 treatments. Begin Date: 2/26/20   Treating Psychiatrist providing ECT: Doug Devi MD   Notified on: 2/25/20     Fall precautions     Fall precautions     Routine Programming     As clinically indicated     Status 15     Every 15 minutes.          DIagnoses:     Major depressive disorder-recurrent, severe  Generalized anxiety disorder  Unspecified personality disorder       Plan:     Continue her current medications without changes.    Treatment with ECT has been resumed while under a [facility].  Treatment notes reviewed and treatment continues to proceed without complications.  The patient is currently allowed treatment twice a week under her current [facility] order which will be scheduled on  Mondays and Fridays.  Documentation has been submitted to the court requesting permission to utilize ECT 3 times a week.    Psychosocial treatments to be addressed with social work consult and groups.    -Once she is agreeable, neuropsychological testing may be helpful to identify any underlying personality characteristics that may be complicating her treatment course.  -Educated on [place] and encouraged to inform her family of this resource  -Patient will be applying for medical assistance    Legal Status: full commitment with [facility]    Disposition: Anticipate transitioning into an intensive residential treatment program after achieving adequate improvement in mood and remission of suicidal thoughts.

## 2020-03-10 NOTE — PROGRESS NOTES
Patient attended Process Group today and participated appropriately. Patient demonstrated good insight into the topic of discussion. She was noted to be soft spoken and difficult to understand at times, but her responses were appropriate to the group discussion.

## 2020-03-11 PROCEDURE — 12400000 ZZH R&B MH

## 2020-03-11 PROCEDURE — 25000132 ZZH RX MED GY IP 250 OP 250 PS 637: Performed by: PSYCHIATRY & NEUROLOGY

## 2020-03-11 PROCEDURE — G0177 OPPS/PHP; TRAIN & EDUC SERV: HCPCS

## 2020-03-11 RX ADMIN — VENLAFAXINE HYDROCHLORIDE 225 MG: 150 CAPSULE, EXTENDED RELEASE ORAL at 08:10

## 2020-03-11 RX ADMIN — BREXPIPRAZOLE 3 MG: 1 TABLET ORAL at 08:10

## 2020-03-11 RX ADMIN — MIRTAZAPINE 45 MG: 45 TABLET, FILM COATED ORAL at 22:07

## 2020-03-11 RX ADMIN — RAMELTEON 8 MG: 8 TABLET ORAL at 22:07

## 2020-03-11 RX ADMIN — LAMOTRIGINE 300 MG: 100 TABLET ORAL at 08:10

## 2020-03-11 ASSESSMENT — ACTIVITIES OF DAILY LIVING (ADL)
LAUNDRY: WITH SUPERVISION
LAUNDRY: WITH SUPERVISION
HYGIENE/GROOMING: INDEPENDENT
HYGIENE/GROOMING: INDEPENDENT
DRESS: INDEPENDENT
ORAL_HYGIENE: INDEPENDENT
DRESS: INDEPENDENT
ORAL_HYGIENE: INDEPENDENT

## 2020-03-11 NOTE — PLAN OF CARE
Problem: Depressive Symptoms  Goal: Depressive Symptoms  Description: Signs and symptoms of listed problems will be absent or manageable.  Suicidal ideation,   Depression.   Outcome: No Change  Note: Pt has been withdrawn and isolative most of the shift but has made attempts to attend group and participate. Pt states she feels like her depression is like a roller coaster as she goes up and then she goes down. Pt continues to have SI thoughts and is not eating to starve herself to death. Pt states if I was at home I would probably overdose. Pt though does identify positive future plans such as being with her family and seeing her kids. Pt has been drinking fluids and has been drinking boost shakes. ECT planned for Friday. Continue to monitor.

## 2020-03-11 NOTE — PROGRESS NOTES
"   03/11/20 1500   General Information   Date Initially Attended OT 03/11/20   Clinical Impression   Affect Flat   Orientation Oriented to person, place and time   Appearance and ADLs General cleanliness observed in most areas   Attention to Internal Stimuli No observed signs   Interaction Skills Interacts with prompts, minimal response   Ability to Communicate Needs Does so with prompts   Verbal Content Appropriate to topic   Ability to Maintain Boundaries Maintains appropriate physical boundaries;Maintains appropriate verbal boundaries   Participation Participates with minimal encouragement   Concentration Concentrates 30+ minutes   Ability to Concentrate With structure   Follows and Comprehends Directions Independently follows multi-step directions   Memory Delayed and immediate recall intact   Organization Independently organizes simple tasks   Decision Making Needs further assessment   Planning and Problem Solving Needs further assessment   Ability to Apply and Learn Concepts Applies within group structure   Frustrations / Stress Tolerance Needs further assessment   Level of Insight Some insight   Self Esteem Poor self esteem   Social Supports Other (see comments)  (limited support outside the hospital)   General Observation/Plan   General Observations/Plan See Comments     INITIAL O.T. ASSESSMENT:      Pt  attended 1 of 2 OT groups today. Pt transitioned to OT group with MIN encouragement and engaged in therapeutic activity addressing functional cognition and interpersonal skills with MIN VCs. With extended time and MIN encouragement, pt participated in therapeutic group activity and discussion addressing wellness. Pt ID'd things that make her smile as her sisters and her kids, something she does well as \"coloring,\" and a goal for the year as to help her son get his drivers license. Of note, pt made some self-deprecating comments; for example, when asked to describe herself in one sentence, pt stated, \"I'm a " "pain in the butt.\" Pt will continue to benefit from OT intervention to address implementation of positive functional coping skills, role performance, and community reintegration.      OT assessment completed by semi-structured interview, chart review, and direct observation. Per chart, pt presented to hospital 2/2 SI. Goals ID'd include to increase motivation, to be more assertive, to better ID and express feelings, and to manage time better. OT staff explained the purpose of pt being involved in current treatment plan.      Plan: Encourage ongoing attendance as able to meet self-stated goals, implement positive coping skills, engage in therapeutic activities, and provide assessment ongoing.      "

## 2020-03-11 NOTE — PROGRESS NOTES
"[facility]  Psychiatric Progress Note    Length of stay (days): 19        Interim History:   The patient's care was discussed with the treatment team during the daily team meeting and/or staff's chart notes were reviewed.  Staff report: No acute issues overnight.  She says she has stopped eating again but she is drinking.    Depression severity scale 0-10 (10=most severe):  Today: 7    Patient reports that she is still experiencing intermittent feelings of hopelessness with passive self-harm thoughts of overdosing on pills which she states she does not want to do.      She states she misses her children and is hoping that someday in the future she will be able to have a home with her children and  again.  She states she is hoping that she can receive ECT 3 times a week as recommended by Dr. Laughlin.  She attended 2 group sessions yesterday  .  She reports ability to maintain safety in the hospital setting however is not able to ensure her own safety if discharged home today.    No homicidal thoughts reported.  No psychotic symptoms reported.    Energy is low, appetite is better, concentration is fair, anhedonia is moderate.    Tolerating medications without side effects.           Medications:       brexpiprazole  3 mg Oral Daily     lamoTRIgine  300 mg Oral Daily     mirtazapine  45 mg Oral At Bedtime     ramelteon  8 mg Oral At Bedtime     venlafaxine  225 mg Oral Daily with breakfast          Allergies:   No Known Allergies       Labs:     No results found for this or any previous visit (from the past 24 hour(s)).       Psychiatric Examination:     /78   Pulse 99   Temp 98.4  F (36.9  C) (Oral)   Resp 16   Ht 1.651 m (5' 5\")   Wt 103.4 kg (228 lb)   SpO2 96%   BMI 37.94 kg/m    Weight is 228 lbs 0 oz  Body mass index is 37.94 kg/m .  Orthostatic Vitals     None      Appearance: awake, alert, adequately groomed, dressed in hospital scrubs and appeared older than stated " age  Attitude:  cooperative  Eye Contact:  fair  Mood:  sad  and depressed  Affect:  mood congruent, intensity is blunted and restricted range  Speech:  decreased prosody  Psychomotor Behavior:  no evidence of tardive dyskinesia, dystonia, or tics  Throught Process:  linear  Associations:  no loose associations  Thought Content:  no evidence of psychotic thought and active suicidal ideation present  Insight:  partial  Judgement:  fair  Oriented to:  time, person, and place  Attention Span and Concentration:  fair  Recent and Remote Memory:  intact          Precautions:     Behavioral Orders   Procedures     Code 1 - Restrict to Unit     Code 2 - 1:1 Staff Supervision     For ECT only     Code 2 - 1:1 Staff Supervision     For ECT only     Electroconvulsive therapy     Series of up to 12 treatments. Begin Date: 2/26/20   Treating Psychiatrist providing ECT: Doug Devi MD   Notified on: 2/25/20     Electroconvulsive therapy     Series of up to 12 treatments. Begin Date: 2/26/20   Treating Psychiatrist providing ECT: Doug Devi MD   Notified on: 2/25/20     Electroconvulsive therapy     Series of up to 12 treatments. Begin Date: 2/26/20   Treating Psychiatrist providing ECT: Doug Devi MD   Notified on: 2/25/20     Fall precautions     Fall precautions     Routine Programming     As clinically indicated     Status 15     Every 15 minutes.          DIagnoses:     Major depressive disorder-recurrent, severe  Generalized anxiety disorder  Unspecified personality disorder       Plan:     Continue her current medications without changes.    Treatment with ECT has been resumed while under a [employer].  Treatment notes reviewed and treatment continues to proceed without complications.  The patient is currently allowed treatment twice a week under her current [employer] order which will be scheduled on Mondays and Fridays.  Documentation has been submitted to the court  requesting permission to utilize ECT 3 times a week.    Psychosocial treatments to be addressed with social work consult and groups.    -Once she is agreeable, neuropsychological testing may be helpful to identify any underlying personality characteristics that may be complicating her treatment course.  -Educated on [place] and encouraged to inform her family of this resource  -Patient will be applying for medical assistance    Legal Status: full commitment with [employer]    Disposition: Anticipate transitioning into an intensive residential treatment program after achieving adequate improvement in mood and remission of suicidal thoughts.

## 2020-03-11 NOTE — PLAN OF CARE
Shift Update: Pt presents with a flat affect and a depressed mood. Pt stated that she has been trying to get a hold of her  today, but has not been able to, and this has contributed to her depressed mood. Pt was encouraged to attend groups, but did not. Pt ate her meal, had adequate fluids, and was med compliant. Pt stated that she wants to starve herself as a SI plan. Insight and judgement are impaired. Pt denies suicidal ideation.

## 2020-03-12 PROCEDURE — 25000132 ZZH RX MED GY IP 250 OP 250 PS 637: Performed by: PSYCHIATRY & NEUROLOGY

## 2020-03-12 PROCEDURE — G0177 OPPS/PHP; TRAIN & EDUC SERV: HCPCS

## 2020-03-12 PROCEDURE — 12400000 ZZH R&B MH

## 2020-03-12 RX ADMIN — BREXPIPRAZOLE 3 MG: 1 TABLET ORAL at 09:38

## 2020-03-12 RX ADMIN — VENLAFAXINE HYDROCHLORIDE 225 MG: 150 CAPSULE, EXTENDED RELEASE ORAL at 09:38

## 2020-03-12 RX ADMIN — MIRTAZAPINE 45 MG: 45 TABLET, FILM COATED ORAL at 21:52

## 2020-03-12 RX ADMIN — LAMOTRIGINE 300 MG: 100 TABLET ORAL at 09:38

## 2020-03-12 RX ADMIN — RAMELTEON 8 MG: 8 TABLET ORAL at 21:52

## 2020-03-12 ASSESSMENT — ACTIVITIES OF DAILY LIVING (ADL)
HYGIENE/GROOMING: INDEPENDENT
ORAL_HYGIENE: INDEPENDENT
DRESS: INDEPENDENT
HYGIENE/GROOMING: INDEPENDENT
DRESS: INDEPENDENT
LAUNDRY: WITH SUPERVISION
ORAL_HYGIENE: INDEPENDENT
LAUNDRY: WITH SUPERVISION

## 2020-03-12 NOTE — PROGRESS NOTES
"[facility]  Psychiatric Progress Note    Length of stay (days): 20        Interim History:   The patient's care was discussed with the treatment team during the daily team meeting and/or staff's chart notes were reviewed.  Staff report: No acute issues overnight.  She states she is still not eating but is drinking.  Staff have been monitoring her and she continues to experience feelings of hopelessness.  Staff have observed that the days that she has ECT she seems to eat better on those days.  Depression severity scale 0-10 (10=most severe):  Today: 7    Patient reports that she is still experiencing intermittent feelings of hopelessness with passive self-harm thoughts of overdosing on pills which she states she does not want to do.  We discussed the option of continuing going on an MAOI down the line and she claims she has not been on 1 before.    She attended 2 group sessions yesterday  .  She reports ability to maintain safety in the hospital setting however is not able to ensure her own safety if discharged home today.    No homicidal thoughts reported.  No psychotic symptoms reported.    Energy is low, appetite is better, concentration is fair, anhedonia is moderate.    Tolerating medications without side effects.           Medications:       brexpiprazole  3 mg Oral Daily     lamoTRIgine  300 mg Oral Daily     mirtazapine  45 mg Oral At Bedtime     ramelteon  8 mg Oral At Bedtime     venlafaxine  225 mg Oral Daily with breakfast          Allergies:   No Known Allergies       Labs:     No results found for this or any previous visit (from the past 24 hour(s)).       Psychiatric Examination:     /73   Pulse 82   Temp 98.4  F (36.9  C) (Oral)   Resp 15   Ht 1.651 m (5' 5\")   Wt 103.4 kg (228 lb)   SpO2 96%   BMI 37.94 kg/m    Weight is 228 lbs 0 oz  Body mass index is 37.94 kg/m .  Orthostatic Vitals     None      Appearance: awake, alert, adequately groomed, dressed in hospital " scrubs and appeared older than stated age  Attitude:  cooperative  Eye Contact:  fair  Mood:  sad  and depressed  Affect:  mood congruent, intensity is blunted and restricted range  Speech:  decreased prosody  Psychomotor Behavior:  no evidence of tardive dyskinesia, dystonia, or tics  Throught Process:  linear  Associations:  no loose associations  Thought Content:  no evidence of psychotic thought and active suicidal ideation present  Insight:  partial  Judgement:  fair  Oriented to:  time, person, and place  Attention Span and Concentration:  fair  Recent and Remote Memory:  intact          Precautions:     Behavioral Orders   Procedures     Code 1 - Restrict to Unit     Code 2 - 1:1 Staff Supervision     For ECT only     Code 2 - 1:1 Staff Supervision     For ECT only     Electroconvulsive therapy     Series of up to 12 treatments. Begin Date: 2/26/20   Treating Psychiatrist providing ECT: Doug Devi MD   Notified on: 2/25/20     Electroconvulsive therapy     Series of up to 12 treatments. Begin Date: 2/26/20   Treating Psychiatrist providing ECT: Doug Devi MD   Notified on: 2/25/20     Electroconvulsive therapy     Series of up to 12 treatments. Begin Date: 2/26/20   Treating Psychiatrist providing ECT: Doug Devi MD   Notified on: 2/25/20     Fall precautions     Fall precautions     Routine Programming     As clinically indicated     Status 15     Every 15 minutes.          DIagnoses:     Major depressive disorder-recurrent, severe  Generalized anxiety disorder  Unspecified personality disorder       Plan:     Continue her current medications without changes.    Treatment with ECT has been resumed while under a [employer].  Treatment notes reviewed and treatment continues to proceed without complications.  The patient is currently allowed treatment twice a week under her current [employer] order which will be scheduled on Mondays and Fridays.  Documentation  has been submitted to the court requesting permission to utilize ECT 3 times a week.    Psychosocial treatments to be addressed with social work consult and groups.    -Once she is agreeable, neuropsychological testing may be helpful to identify any underlying personality characteristics that may be complicating her treatment course.  -Educated on [place] and encouraged to inform her family of this resource  -Patient will be applying for medical assistance    Legal Status: full commitment with [employer]    Disposition: Anticipate transitioning into an intensive residential treatment program after achieving adequate improvement in mood and remission of suicidal thoughts.

## 2020-03-12 NOTE — PROGRESS NOTES
The [employer] exam and hearing have been scheduled for Friday April 3rd, at 1pm and 1:45pm, respectively.    I called pt's Manjeet Irvin CM (Samreen Machuca) to update her but her voice mailbox was full so I was unable to leave a message.

## 2020-03-12 NOTE — PLAN OF CARE
Problem: Suicidal Behavior  Goal: Suicidal Behavior is Absent or Managed  Flowsheets (Taken 3/12/2020 1415)  Mutually Determined Action Steps (Facilitate Resolution of Suicidal Intent): sets future-oriented goal  Mutually Determined Action Steps (Provide Immediate/Ongoing Protective Physical Environment):   shares suicidal thoughts   verbalizes safety check rationale  Note: Patient states that she has a plan to suicide by overdosing on medications. Today she has spent the entire time in bed. She needed encouragement to get up for every meal. She only ordered liquids for each meal. She drank the liquids with someone monitoring her. She only chose liquids for tomorrow so helped her choose some food items.

## 2020-03-12 NOTE — PLAN OF CARE
Pt transitioned to OT group with MIN encouragement and engaged in therapeutic activity addressing functional cognition and interpersonal skills with direct VCs. With MIN prompting, pt participated in therapeutic group activity and discussion addressing the importance of leisure activity engagement. Pt reports she has a limited range of interests and cited low motivation as barrier to engagement in meaningful activities. Pt completed interest checklist and ID'd leisure activities she would like to engage in more (e.g. travel, reading, computer, languages). Pt will continue to benefit from OT intervention to address implementation of positive functional coping skills, role performance, and community reintegration.

## 2020-03-12 NOTE — PLAN OF CARE
"Pt had very depressed mood with flat and blunted affect. Has been withdrawn and isolative the whole shift, just came out couple times to make phone calls. Pt continues to have SI thoughts and is not eating to starve herself to death. Pt states \"if I was at home, I would probably overdose\". Rated depression 7 out of 10 and said her anxiety \"is not too bad\". ECT planned for Friday. Had 2 juices and a granola bar. Did not showered. Pt was encouraged to attend groups but she did not.  "

## 2020-03-13 ENCOUNTER — APPOINTMENT (OUTPATIENT)
Dept: SURGERY | Facility: CLINIC | Age: 35
End: 2020-03-13
Payer: COMMERCIAL

## 2020-03-13 PROCEDURE — 25000132 ZZH RX MED GY IP 250 OP 250 PS 637: Performed by: PSYCHIATRY & NEUROLOGY

## 2020-03-13 PROCEDURE — 12400000 ZZH R&B MH

## 2020-03-13 RX ORDER — SODIUM CHLORIDE, SODIUM LACTATE, POTASSIUM CHLORIDE, CALCIUM CHLORIDE 600; 310; 30; 20 MG/100ML; MG/100ML; MG/100ML; MG/100ML
500 INJECTION, SOLUTION INTRAVENOUS CONTINUOUS
Status: DISCONTINUED | OUTPATIENT
Start: 2020-03-13 | End: 2020-03-17

## 2020-03-13 RX ADMIN — RAMELTEON 8 MG: 8 TABLET ORAL at 21:11

## 2020-03-13 RX ADMIN — VENLAFAXINE HYDROCHLORIDE 225 MG: 150 CAPSULE, EXTENDED RELEASE ORAL at 08:42

## 2020-03-13 RX ADMIN — MIRTAZAPINE 45 MG: 45 TABLET, FILM COATED ORAL at 21:11

## 2020-03-13 RX ADMIN — BREXPIPRAZOLE 3 MG: 1 TABLET ORAL at 08:41

## 2020-03-13 RX ADMIN — LAMOTRIGINE 300 MG: 100 TABLET ORAL at 08:41

## 2020-03-13 ASSESSMENT — ACTIVITIES OF DAILY LIVING (ADL)
ORAL_HYGIENE: INDEPENDENT
DRESS: INDEPENDENT
HYGIENE/GROOMING: INDEPENDENT
LAUNDRY: WITH SUPERVISION
DRESS: INDEPENDENT
LAUNDRY: WITH SUPERVISION
HYGIENE/GROOMING: INDEPENDENT
ORAL_HYGIENE: INDEPENDENT

## 2020-03-13 NOTE — PLAN OF CARE
Problem: Depressive Symptoms  Goal: Depressive Symptoms  Description: Signs and symptoms of listed problems will be absent or manageable.  Suicidal ideation,   Depression.   Outcome: No Change  Note: Pt remains depressed with flat affect. Pt continues to have suicidal ideation thinking about overdosing however pt does know that she is unable to do this in the hospital so she is refusing to eat. Pt has ana taking in fluids including boost supplements. Pt did talk about future plans and does want to visit where her  is from. Pt acknowledges that she has these plans but feels like she just can't feel right to do them. Pt expressed that she feels like her moods are like a roller coaster and that she gets these ups and downs Pt feel exhausted form this up down feeling and states this is why she just wants to give up and suicide. Pt states ECT does help her but it is only works for a few days and then she is back to where she was.Pt is encouraged to remains positive and focus on these things that are good in her life. Pt is also encouraged to use there coping skills that she has learned to deal with these up and down moments.plan to continue with ECT txs and price alexi update for increased frequency of ECT. Nursing to continue to monitor.

## 2020-03-13 NOTE — PLAN OF CARE
Problem: Depressive Symptoms  Goal: Depressive Symptoms  Description: Signs and symptoms of listed problems will be absent or manageable.  Suicidal ideation,   Depression.   Outcome: No Change  Flowsheets (Taken 3/12/2020 2146)  Depressive Symptoms Assessed: all  Depressive Symptoms Present:   affect   mood   insight   thought process   suicidality   memory deficits   psychomotor activity  Note: Pt presents with a flat affect and depressed mood. Pt spent the majority of the shift in her room resting or napping. Pt attended wrap up group upon coaxing, but still minimally participated. Pt was warmer upon slight conversation with another peers, but withdrawn to other staff and peers. Pt ate most of her dinner and was med compliant. Pt still endorsing chronic Si.

## 2020-03-13 NOTE — PROGRESS NOTES
"[facility]  Psychiatric Progress Note    Length of stay (days): 21        Interim History:   The patient's care was discussed with the treatment team during the daily team meeting and/or staff's chart notes were reviewed.  Staff report: No acute issues overnight.  She states she is still not eating but is drinking.  Unfortunately she could not have her ECT treatment this morning on account of conflicting schedules in PACU.      Depression severity scale 0-10 (10=most severe):  Today: 7    Patient reports that she is still experiencing intermittent feelings of hopelessness with passive self-harm thoughts of overdosing on pills which she states she does not want to do.      She reports ability to maintain safety in the hospital setting however is not able to ensure her own safety if discharged home today.    No homicidal thoughts reported.  No psychotic symptoms reported.    Energy is low, appetite is better, concentration is fair, anhedonia is moderate.    Tolerating medications without side effects.           Medications:       brexpiprazole  3 mg Oral Daily     lamoTRIgine  300 mg Oral Daily     mirtazapine  45 mg Oral At Bedtime     ramelteon  8 mg Oral At Bedtime     venlafaxine  225 mg Oral Daily with breakfast          Allergies:   No Known Allergies       Labs:     No results found for this or any previous visit (from the past 24 hour(s)).       Psychiatric Examination:     /77   Pulse 79   Temp 98.1  F (36.7  C) (Oral)   Resp 16   Ht 1.651 m (5' 5\")   Wt 103.4 kg (228 lb)   SpO2 96%   BMI 37.94 kg/m    Weight is 228 lbs 0 oz  Body mass index is 37.94 kg/m .  Orthostatic Vitals     None      Appearance: awake, alert, adequately groomed, dressed in hospital scrubs and appeared older than stated age  Attitude:  cooperative  Eye Contact:  fair  Mood:  sad  and depressed  Affect:  mood congruent, intensity is blunted and restricted range  Speech:  decreased prosody  Psychomotor " Behavior:  no evidence of tardive dyskinesia, dystonia, or tics  Throught Process:  linear  Associations:  no loose associations  Thought Content:  no evidence of psychotic thought and active suicidal ideation present  Insight:  partial  Judgement:  fair  Oriented to:  time, person, and place  Attention Span and Concentration:  fair  Recent and Remote Memory:  intact          Precautions:     Behavioral Orders   Procedures     Code 1 - Restrict to Unit     Code 2 - 1:1 Staff Supervision     For ECT only     Code 2 - 1:1 Staff Supervision     For ECT only     Electroconvulsive therapy     Series of up to 12 treatments. Begin Date: 2/26/20   Treating Psychiatrist providing ECT: Doug Devi MD   Notified on: 2/25/20     Electroconvulsive therapy     Series of up to 12 treatments. Begin Date: 2/26/20   Treating Psychiatrist providing ECT: Doug Devi MD   Notified on: 2/25/20     Electroconvulsive therapy     Series of up to 12 treatments. Begin Date: 2/26/20   Treating Psychiatrist providing ECT: Doug eDvi MD   Notified on: 2/25/20     Electroconvulsive therapy     Series of up to 12 treatments. Begin Date: 2/26/20   Treating Psychiatrist providing ECT: Doug Devi MD   Notified on: 2/25/20     Fall precautions     Fall precautions     Routine Programming     As clinically indicated     Status 15     Every 15 minutes.          DIagnoses:     Major depressive disorder-recurrent, severe  Generalized anxiety disorder  Unspecified personality disorder       Plan:     Continue her current medications without changes.    Treatment with ECT has been resumed while under a [employer].  Treatment notes reviewed and treatment continues to proceed without complications.  The patient is currently allowed treatment twice a week under her current [employer] order which will be scheduled on Mondays and Fridays.  Documentation has been submitted to the court requesting  permission to utilize ECT 3 times a week.    Psychosocial treatments to be addressed with social work consult and groups.    -Once she is agreeable, neuropsychological testing may be helpful to identify any underlying personality characteristics that may be complicating her treatment course.  -Educated on [place] and encouraged to inform her family of this resource  -Patient will be applying for medical assistance    Legal Status: full commitment with [employer]    Disposition: Anticipate transitioning into an intensive residential treatment program after achieving adequate improvement in mood and remission of suicidal thoughts.

## 2020-03-13 NOTE — PLAN OF CARE
Pt transitioned to OT group with MIN encouragement and engaged in therapeutic activity addressing functional cognition and interpersonal skills with task set up. With direct VCs, pt participated in therapeutic group activity and discussion addressing weekend planning. Educated pt on four categories of activity (self-care, leisure, productivity, and social) and the importance of balance between categories for wellness with pt verbalizing understanding. Pt ID'd activities she could complete this weekend in each of the four categories of activity (e.g. walk the halls, watch a movie, call her kids, and attend groups). Despite prompting, pt was unable to ID more than one activity per category to participate in this weekend. Pt will continue to benefit from OT intervention to address implementation of positive functional coping skills, role performance, and community reintegration.

## 2020-03-14 PROCEDURE — 25000132 ZZH RX MED GY IP 250 OP 250 PS 637: Performed by: PSYCHIATRY & NEUROLOGY

## 2020-03-14 PROCEDURE — 12400000 ZZH R&B MH

## 2020-03-14 RX ADMIN — LAMOTRIGINE 300 MG: 100 TABLET ORAL at 08:13

## 2020-03-14 RX ADMIN — RAMELTEON 8 MG: 8 TABLET ORAL at 22:07

## 2020-03-14 RX ADMIN — MIRTAZAPINE 45 MG: 45 TABLET, FILM COATED ORAL at 22:07

## 2020-03-14 RX ADMIN — VENLAFAXINE HYDROCHLORIDE 225 MG: 150 CAPSULE, EXTENDED RELEASE ORAL at 08:13

## 2020-03-14 RX ADMIN — BREXPIPRAZOLE 3 MG: 1 TABLET ORAL at 08:12

## 2020-03-14 ASSESSMENT — ACTIVITIES OF DAILY LIVING (ADL)
ORAL_HYGIENE: PROMPTS
HYGIENE/GROOMING: PROMPTS
LAUNDRY: UNABLE TO COMPLETE
DRESS: PROMPTS

## 2020-03-14 NOTE — PLAN OF CARE
Flat affect, depressed and hopeless in mood. Patient reports that she is tired of beeing on the roller coaster of emotions. ECT help and she feels better but then a few days later it wears off and her mood is worse. She still endorses passive self-harm thoughts of overdosing on pills when she gets home. Continues to not want to eat.Reports that she did not sleep well last night, so she is feeling more tire today. [facility] Med compliant.

## 2020-03-14 NOTE — PLAN OF CARE
Problem: Depressive Symptoms  Goal: Depressive Symptoms  Description: Signs and symptoms of listed problems will be absent or manageable.  Suicidal ideation,   Depression.   3/13/2020 2309 by Alicia Fu RN  Outcome: No Change  Flowsheets (Taken 3/13/2020 2309)  Depressive Symptoms Assessed: all  Depressive Symptoms Present: thought process  Note: Patient visible first part of shift. Patient did not attend groups but did socialize with peers some. Patient sipped some juice this shift. Patient endorses some suicidal ideation but contracts for safety.

## 2020-03-15 PROCEDURE — 12400000 ZZH R&B MH

## 2020-03-15 PROCEDURE — 25000132 ZZH RX MED GY IP 250 OP 250 PS 637: Performed by: PSYCHIATRY & NEUROLOGY

## 2020-03-15 RX ADMIN — MIRTAZAPINE 45 MG: 45 TABLET, FILM COATED ORAL at 22:06

## 2020-03-15 RX ADMIN — RAMELTEON 8 MG: 8 TABLET ORAL at 22:06

## 2020-03-15 RX ADMIN — BREXPIPRAZOLE 3 MG: 1 TABLET ORAL at 08:37

## 2020-03-15 RX ADMIN — LAMOTRIGINE 300 MG: 100 TABLET ORAL at 08:37

## 2020-03-15 RX ADMIN — VENLAFAXINE HYDROCHLORIDE 225 MG: 150 CAPSULE, EXTENDED RELEASE ORAL at 08:37

## 2020-03-15 ASSESSMENT — ACTIVITIES OF DAILY LIVING (ADL)
ORAL_HYGIENE: INDEPENDENT
DRESS: INDEPENDENT
HYGIENE/GROOMING: INDEPENDENT

## 2020-03-15 NOTE — PLAN OF CARE
Pt presents with flat blunt affect and depressed mood. Pt refused any unit programming and meals. Pt did end up drinking a Boost and a hot chocolate with staff prompting. Pt was lethargic and appears hopeless.. Med compliant, no Si verbalized.

## 2020-03-15 NOTE — PLAN OF CARE
"Problem: Depressive Symptoms  Goal: Depressive Symptoms  Description: Signs and symptoms of listed problems will be absent or manageable.  Suicidal ideation,   Depression.   Outcome: No Change  Flowsheets (Taken 3/15/2020 1314)  Depressive Symptoms Assessed: all  Depressive Symptoms Present:   mood   suicidality   insight   thought process   affect  Note: Flat affect, endorses hopelessness. \"I don't want to help myself\" so \"I'm just waiting everybody's time.\". \"I used to cut myself when I was young because I enjoyed the pain.\" Did not attend unit programming and is still not eating but she is taking in fluids. \"I can feel myself getting weaker and I deserve the pain.\" Still endorses passive SI, stating she should be allowed to go home, so she can overdose. Med compliant     "

## 2020-03-16 ENCOUNTER — ANESTHESIA EVENT (OUTPATIENT)
Dept: SURGERY | Facility: CLINIC | Age: 35
End: 2020-03-16

## 2020-03-16 ENCOUNTER — ANESTHESIA (OUTPATIENT)
Dept: SURGERY | Facility: CLINIC | Age: 35
End: 2020-03-16

## 2020-03-16 ENCOUNTER — APPOINTMENT (OUTPATIENT)
Dept: SURGERY | Facility: CLINIC | Age: 35
End: 2020-03-16
Payer: COMMERCIAL

## 2020-03-16 PROCEDURE — 90870 ELECTROCONVULSIVE THERAPY: CPT

## 2020-03-16 PROCEDURE — 25000132 ZZH RX MED GY IP 250 OP 250 PS 637: Performed by: PSYCHIATRY & NEUROLOGY

## 2020-03-16 PROCEDURE — 25800030 ZZH RX IP 258 OP 636: Performed by: SURGERY

## 2020-03-16 PROCEDURE — 25800030 ZZH RX IP 258 OP 636: Performed by: NURSE ANESTHETIST, CERTIFIED REGISTERED

## 2020-03-16 PROCEDURE — 12400000 ZZH R&B MH

## 2020-03-16 PROCEDURE — 40000671 ZZH STATISTIC ANESTHESIA CASE

## 2020-03-16 PROCEDURE — 25000128 H RX IP 250 OP 636: Performed by: NURSE ANESTHETIST, CERTIFIED REGISTERED

## 2020-03-16 PROCEDURE — 25000125 ZZHC RX 250: Performed by: NURSE ANESTHETIST, CERTIFIED REGISTERED

## 2020-03-16 RX ORDER — LITHIUM CARBONATE 300 MG/1
300 TABLET, FILM COATED, EXTENDED RELEASE ORAL EVERY 12 HOURS SCHEDULED
Status: DISCONTINUED | OUTPATIENT
Start: 2020-03-16 | End: 2020-03-19

## 2020-03-16 RX ORDER — VENLAFAXINE HYDROCHLORIDE 150 MG/1
150 CAPSULE, EXTENDED RELEASE ORAL
Status: DISCONTINUED | OUTPATIENT
Start: 2020-03-17 | End: 2020-04-01

## 2020-03-16 RX ORDER — SODIUM CHLORIDE, SODIUM LACTATE, POTASSIUM CHLORIDE, CALCIUM CHLORIDE 600; 310; 30; 20 MG/100ML; MG/100ML; MG/100ML; MG/100ML
INJECTION, SOLUTION INTRAVENOUS CONTINUOUS PRN
Status: DISCONTINUED | OUTPATIENT
Start: 2020-03-16 | End: 2020-03-16

## 2020-03-16 RX ORDER — LAMOTRIGINE 100 MG/1
200 TABLET ORAL DAILY
Status: DISCONTINUED | OUTPATIENT
Start: 2020-03-17 | End: 2020-04-25 | Stop reason: HOSPADM

## 2020-03-16 RX ADMIN — LAMOTRIGINE 300 MG: 100 TABLET ORAL at 07:59

## 2020-03-16 RX ADMIN — VENLAFAXINE HYDROCHLORIDE 225 MG: 150 CAPSULE, EXTENDED RELEASE ORAL at 07:58

## 2020-03-16 RX ADMIN — BREXPIPRAZOLE 3 MG: 1 TABLET ORAL at 07:58

## 2020-03-16 RX ADMIN — SODIUM CHLORIDE, POTASSIUM CHLORIDE, SODIUM LACTATE AND CALCIUM CHLORIDE: 600; 310; 30; 20 INJECTION, SOLUTION INTRAVENOUS at 06:36

## 2020-03-16 RX ADMIN — LITHIUM CARBONATE 300 MG: 300 TABLET, EXTENDED RELEASE ORAL at 21:03

## 2020-03-16 RX ADMIN — ACETAMINOPHEN 650 MG: 325 TABLET, FILM COATED ORAL at 07:58

## 2020-03-16 RX ADMIN — MIRTAZAPINE 45 MG: 45 TABLET, FILM COATED ORAL at 21:03

## 2020-03-16 RX ADMIN — SUCCINYLCHOLINE CHLORIDE 100 MG: 20 INJECTION, SOLUTION INTRAMUSCULAR; INTRAVENOUS; PARENTERAL at 06:38

## 2020-03-16 RX ADMIN — SODIUM CHLORIDE, POTASSIUM CHLORIDE, SODIUM LACTATE AND CALCIUM CHLORIDE 500 ML: 600; 310; 30; 20 INJECTION, SOLUTION INTRAVENOUS at 06:30

## 2020-03-16 RX ADMIN — RAMELTEON 8 MG: 8 TABLET ORAL at 21:03

## 2020-03-16 RX ADMIN — METHOHEXITAL SODIUM 100 MG: 500 INJECTION, POWDER, LYOPHILIZED, FOR SOLUTION INTRAMUSCULAR; INTRAVENOUS; RECTAL at 06:38

## 2020-03-16 ASSESSMENT — ACTIVITIES OF DAILY LIVING (ADL)
HYGIENE/GROOMING: INDEPENDENT
DRESS: INDEPENDENT
ORAL_HYGIENE: INDEPENDENT

## 2020-03-16 ASSESSMENT — LIFESTYLE VARIABLES: TOBACCO_USE: 0

## 2020-03-16 NOTE — PROGRESS NOTES
Patient's , Samreen Machuca, [phone], called to state that she may stop in to see patient today.  I left a voice mail for business office to have them check if patient's insurance is still active.

## 2020-03-16 NOTE — ANESTHESIA POSTPROCEDURE EVALUATION
Patient: Misty Parham    * No procedures listed *    Diagnosis:* No pre-op diagnosis entered *  Diagnosis Additional Information: No value filed.    Anesthesia Type:  General    Note:  Anesthesia Post Evaluation    Patient location during evaluation: PACU  Patient participation: Able to fully participate in evaluation  Level of consciousness: awake  Pain management: adequate  Airway patency: patent  Cardiovascular status: acceptable  Respiratory status: acceptable  Hydration status: acceptable  PONV: controlled     Anesthetic complications: None          Last vitals:  Vitals:    03/16/20 0700 03/16/20 0705 03/16/20 0710   BP: 119/58 107/69    Pulse: 89 88    Resp: 14 15 20   Temp:      SpO2: 93% 94% 95%         Electronically Signed By: Niki Peoples MD  March 16, 2020  7:12 AM

## 2020-03-16 NOTE — PROGRESS NOTES
Patient's [facility] , Samreen Machuca, [phone], stopped by to see patient this afternoon. She reviewed with  that patient will be contacting her employer on Thursday to notify them that she will not be returning. Patient's [facility]  discussed with patient some benefits to her of being on medical assistance as well as possibly applying for SSDI in the future.

## 2020-03-16 NOTE — PLAN OF CARE
Problem: Depressive Symptoms  Goal: Depressive Symptoms  Description: Signs and symptoms of listed problems will be absent or manageable.  Suicidal ideation,   Depression.   3/15/2020 2243 by Alicia Fu RN  Outcome: No Change  Flowsheets (Taken 3/15/2020 2243)  Depressive Symptoms Assessed: all  Depressive Symptoms Present:   mood   suicidality  Note: Patient more visible this evening than yesterday evening. Seen on the phone talking to her son & watching TV in the lounge this shift. Patient did drink 3 orange juices and 1 hot chocolate this shift. During staff check in patient still endorsed feeling suicidal but did contract for safety. Patient said she would overdose if at home. Patient said she would like to ask MD about unilateral ECT and magnetic therapy. Patient said her  is using their money to build a house in [place]. Patient would rather spend the family resources here but said she would like to possibly live in the new house.

## 2020-03-16 NOTE — PROCEDURES
[facility] ECT Procedure Note     Misty Parham [id number]   34 year old 1985     Patient Status: Inpatient    No Known Allergies    Weight:  228 lbs 0 oz    Patient Preparations: Glasses/Contacts removed         Diagnosis:   Major depression     Medications ineffective, Deteriorating fluid/electrolyte/nutritional status, History of good ECT response in one or more previous episodes of illness and currently receiving maintenance  ECT per lucas truong order 2X per week for duration of commitment. So far she is not endorsing much benefit.        Pause for the Cause:     Right patient Yes   Right procedure/laterality settings: Yes   Right diagnosis Yes          Intra-Procedure Documentation:     Date:  3/16/2020  Time:  6:33 AM    ECT #    Treatment number this series: 6   Total treatment number: 6   Type of ECT:  Bilateral, standard    ECT Medications administered: Brevital: 100mg  Succinyl Choline: 100mg         Clinical Narrative:     ECT was administered by Thymatron machine.  Pt has been medically cleared for procedure, consent not needed as ECT tx is being done under court order. Side effects, Risks and benefits reviewed. Misty is IP, receiving ECT under price truong order, 2 ECT tx per week for duration of committment M/W schedule until price-truong is amended for more frequent treatment  ECT Strip Summary:   Energy Level: 75 percent  Motor Seizure Duration: 30 seconds  EEG Seizure Duration:  40 seconds    Complications: No    Plan: 7th ECT 3/18/20 per price truong order for duration of committment. Would suggest re-evaluation of current meds, consider lithium augmentation due to persistent SI, poor antidepressant response    Doug Devi MD

## 2020-03-16 NOTE — PLAN OF CARE
Problem: Depressive Symptoms  Goal: Depressive Symptoms  Description: Signs and symptoms of listed problems will be absent or manageable.  Suicidal ideation,   Depression.   3/16/2020 1412 by Natty Mauricio, RN  Outcome: No Change  Flowsheets (Taken 3/16/2020 1412)  Depressive Symptoms Assessed: all  Depressive Symptoms Present:   mood   affect   thought process  Note: Flat but reactive affect, mood calm. Had ECT today, endorsed a headache, prn Tylenol given.  Did report that she feels better after ECT but then says her mood falls off and that she is tired of the roller coaster of emotions.Still isolative and withdrawn, spending most of her time in her room. Ate both part of breakfast and lunch, continues to drink fluids. Denied SI. Med complian

## 2020-03-16 NOTE — ANESTHESIA PREPROCEDURE EVALUATION
Anesthesia Pre-Procedure Evaluation    Patient: Misty Parham   MRN: [id number] : 1985          Preoperative Diagnosis: * No pre-op diagnosis entered *    * No procedures listed *    Past Medical History:   Diagnosis Date     Depressive disorder      Past Surgical History:   Procedure Laterality Date     CHOLECYSTECTOMY         Anesthesia Evaluation     . Pt has had prior anesthetic. Type: General    No history of anesthetic complications          ROS/MED HX    ENT/Pulmonary:      (-) tobacco use and sleep apnea   Neurologic:      (-) Delerium   Cardiovascular:     (+) ----. : . . . :. . Previous cardiac testing date:results:date: results:ECG reviewed date:2020 results:NSR date: results:          METS/Exercise Tolerance:  >4 METS   Hematologic:         Musculoskeletal:         GI/Hepatic:        (-) GERD   Renal/Genitourinary:         Endo:     (+) Obesity (BMI 37), .      Psychiatric:     (+) psychiatric history (severe, requring ECT) depression      Infectious Disease:         Malignancy:         Other:    (+) No chance of pregnancy                           Physical Exam      Airway   Mallampati: II  TM distance: >3 FB  Neck ROM: full    Dental   (+) missing and chipped    Cardiovascular   Rhythm and rate: regular      Pulmonary    breath sounds clear to auscultation            Lab Results   Component Value Date    WBC 9.2 2020    HGB 12.7 2020    HCT 39.1 2020     2020     2020    POTASSIUM 3.4 2020    CHLORIDE 104 2020    CO2 26 2020    BUN 13 2020    CR 0.87 2020    GLC 92 2020    CRISTINE 9.1 2020    ALBUMIN 3.8 2020    PROTTOTAL 7.6 2020    ALT 15 2020    AST 13 2020    ALKPHOS 78 2020    BILITOTAL 0.4 2020    TSH 1.08 2020    HCG Negative 2020    HCGS Negative 2020       Preop Vitals  BP Readings from Last 3 Encounters:   20 109/69   20 120/88   20  "114/83    Pulse Readings from Last 3 Encounters:   03/15/20 84   02/21/20 109   02/18/20 95      Resp Readings from Last 3 Encounters:   03/16/20 16   02/21/20 16   02/18/20 16    SpO2 Readings from Last 3 Encounters:   03/16/20 99%   02/21/20 97%   02/18/20 99%      Temp Readings from Last 1 Encounters:   03/16/20 36.9  C (98.5  F) (Oral)    Ht Readings from Last 1 Encounters:   02/21/20 1.651 m (5' 5\")      Wt Readings from Last 1 Encounters:   03/10/20 103.4 kg (228 lb)    Estimated body mass index is 37.94 kg/m  as calculated from the following:    Height as of 2/21/20: 1.651 m (5' 5\").    Weight as of 3/10/20: 103.4 kg (228 lb).     No Known Allergies  Social History     Tobacco Use     Smoking status: Never Smoker     Smokeless tobacco: Never Used   Substance Use Topics     Alcohol use: Not Currently     Frequency: Never     Prior to Admission medications    Medication Sig Start Date End Date Taking? Authorizing Provider   brexpiprazole (REXULTI) 3 MG tablet Take 1 tablet (3 mg) by mouth daily 1/16/20  Yes Baron Laughlin MD   lamoTRIgine (LAMICTAL) 150 MG tablet Take 300 mg by mouth daily   Yes Unknown, Entered By History   mirtazapine (REMERON) 45 MG tablet Take 1 tablet (45 mg) by mouth At Bedtime 1/16/20  Yes Baron Laughlin MD   venlafaxine (EFFEXOR-XR) 75 MG 24 hr capsule Take 3 capsules (225 mg) by mouth daily (with breakfast) 1/17/20  Yes Baron Laughlin MD   hydrOXYzine (ATARAX) 25 MG tablet Take 1-2 tablets (25-50 mg) by mouth 3 times daily as needed for anxiety 1/16/20   Baron Laughlin MD     Current Facility-Administered Medications Ordered in Epic   Medication Dose Route Frequency Last Rate Last Dose     acetaminophen (TYLENOL) tablet 650 mg  650 mg Oral Q4H PRN   650 mg at 03/02/20 2103     brexpiprazole (REXULTI) tablet 3 mg  3 mg Oral Daily   3 mg at 03/15/20 0837     hydrOXYzine (ATARAX) tablet 25-50 mg  25-50 mg Oral TID PRN   50 mg at 02/24/20 1925     lactated ringers infusion  500 mL " Intravenous Continuous         lamoTRIgine (LaMICtal) tablet 300 mg  300 mg Oral Daily   300 mg at 03/15/20 0837     lidocaine 1 % 0.1-1 mL  0.1-1 mL Other Q1H PRN         lidocaine patch in PLACE   Transdermal Q8H         mirtazapine (REMERON) tablet 45 mg  45 mg Oral At Bedtime   45 mg at 03/15/20 2206     ramelteon (ROZEREM) tablet 8 mg  8 mg Oral At Bedtime   8 mg at 03/15/20 2206     sodium chloride (PF) 0.9% PF flush 3 mL  3 mL Intracatheter q1 min prn         venlafaxine (EFFEXOR-XR) 24 hr capsule 225 mg  225 mg Oral Daily with breakfast   225 mg at 03/15/20 0837     No current [facility]-ordered outpatient medications on file.       lactated ringers       Recent Labs   Lab Test 02/24/20  0617 02/21/20  1143    141   POTASSIUM 3.4 3.9   CHLORIDE 104 107   CO2 26 24   ANIONGAP 7 10   GLC 92 68*   BUN 13 23   CR 0.87 0.81   CRISTINE 9.1 9.3     Recent Labs   Lab Test 02/17/20 2022 11/26/19  0751   WBC 9.2 6.6   HGB 12.7 13.0    298     No results for input(s): ABO, RH in the last 12580 hours.  No results for input(s): TROPI in the last 60886 hours.  No results for input(s): PH, PCO2, PO2, HCO3 in the last 20727 hours.  Recent Labs   Lab Test 02/19/20  1214   HCG Negative     No results found for this or any previous visit (from the past 744 hour(s)).  No results found for this or any previous visit (from the past 4320 hour(s)).      RECENT LABS:       Anesthesia Plan      History & Physical Review  History and physical reviewed and following examination; no interval change.    ASA Status:  2 .    NPO Status:  > 8 hours    Plan for General            Postoperative Care      Consents  Anesthetic plan, risks, benefits and alternatives discussed with:  Patient..                   Niki Peoples MD

## 2020-03-16 NOTE — PROGRESS NOTES
[facility]  Psychiatric Progress Note    Length of stay (days): 24        Interim History:   The patient's care was discussed with the treatment team during the daily team meeting and/or staff's chart notes were reviewed.  Staff report: No acute issues overnight.  She states she is still not eating but is drinking.  Her case was staffed with Dr. Devi who administered her ECT this morning and he shares the opinion that the patient is not showing significant improvement with ECT.  Her treatments have been moved to Mondays and Wednesdays at this time on account of logistic issues experienced last week.  The option of utilizing lithium or Emsam for augmentation were discussed and the patient verbalized understanding and willingness to consider either of these medications.  She tells me that she has been feeling this way since the age of 11 even though she has bouts of euthymia.    Depression severity scale 0-10 (10=most severe):  Today: 7    Patient reports that she is still experiencing intermittent feelings of hopelessness with passive self-harm thoughts of overdosing on pills which she states she does not want to do.      She reports ability to maintain safety in the hospital setting however is not able to ensure her own safety if discharged home today.    No homicidal thoughts reported.  No psychotic symptoms reported.    Energy is low, appetite is better, concentration is fair, anhedonia is moderate.    Tolerating medications without side effects.           Medications:       brexpiprazole  3 mg Oral Daily     lamoTRIgine  300 mg Oral Daily     lidocaine   Transdermal Q8H     mirtazapine  45 mg Oral At Bedtime     ramelteon  8 mg Oral At Bedtime     venlafaxine  225 mg Oral Daily with breakfast          Allergies:   No Known Allergies       Labs:     No results found for this or any previous visit (from the past 24 hour(s)).       Psychiatric Examination:     /84   Pulse 87   Temp  "98.6  F (37  C) (Oral)   Resp 16   Ht 1.651 m (5' 5\")   Wt 103.4 kg (228 lb)   SpO2 96%   BMI 37.94 kg/m    Weight is 228 lbs 0 oz  Body mass index is 37.94 kg/m .  Orthostatic Vitals     None      Appearance: awake, alert, adequately groomed, dressed in hospital scrubs and appeared older than stated age  Attitude:  cooperative  Eye Contact:  fair  Mood:  sad  and depressed  Affect:  mood congruent, intensity is blunted and restricted range  Speech:  decreased prosody  Psychomotor Behavior:  no evidence of tardive dyskinesia, dystonia, or tics  Throught Process:  linear  Associations:  no loose associations  Thought Content:  no evidence of psychotic thought and active suicidal ideation present  Insight:  partial  Judgement:  fair  Oriented to:  time, person, and place  Attention Span and Concentration:  fair  Recent and Remote Memory:  intact          Precautions:     Behavioral Orders   Procedures     Code 1 - Restrict to Unit     Code 2 - 1:1 Staff Supervision     For ECT only     Code 2 - 1:1 Staff Supervision     For ECT only     Electroconvulsive therapy     Series of up to 12 treatments. Begin Date: 2/26/20   Treating Psychiatrist providing ECT: Doug Devi MD   Notified on: 2/25/20     Electroconvulsive therapy     Series of up to 12 treatments. Begin Date: 2/26/20   Treating Psychiatrist providing ECT: Doug Devi MD   Notified on: 2/25/20     Electroconvulsive therapy     Series of up to 12 treatments. Begin Date: 2/26/20   Treating Psychiatrist providing ECT: Doug Devi MD   Notified on: 2/25/20     Electroconvulsive therapy     Series of up to 12 treatments. Begin Date: 2/26/20   Treating Psychiatrist providing ECT: Doug Devi MD   Notified on: 2/25/20     Fall precautions     Fall precautions     Routine Programming     As clinically indicated     Status 15     Every 15 minutes.          DIagnoses:     Major depressive disorder-recurrent, " severe  Generalized anxiety disorder  Unspecified personality disorder       Plan:     Continue her current medications but reduce Lamictal to 200 mg daily and add lithium at 300 mg twice daily with a plan to hold the medication in the mornings before ECT.  Reduce Effexor XR to 150 mg daily to forestall serotonergic overdrive with the addition of lithium.    Treatment with ECT has been resumed while under a [facility].  Treatment notes reviewed and treatment continues to proceed without complications.  The patient is currently allowed treatment twice a week under her current [facility] order which will be scheduled on Mondays and Fridays.  Documentation has been submitted to the court requesting permission to utilize ECT 3 times a week.    Psychosocial treatments to be addressed with social work consult and groups.    -Once she is agreeable, neuropsychological testing may be helpful to identify any underlying personality characteristics that may be complicating her treatment course.  -Educated on [place] and encouraged to inform her family of this resource  -Patient will be applying for medical assistance    Legal Status: full commitment with [facility]    Disposition: Anticipate transitioning into an intensive residential treatment program after achieving adequate improvement in mood and remission of suicidal thoughts.

## 2020-03-16 NOTE — PLAN OF CARE
BEHAVIORAL TEAM DISCUSSION    Participants: MD, OT, CM, RN  Progress: Depressed, hopeless, passive thoughts of SI  Anticipated length of stay:unknown  Continued Stay Criteria/Rationale: pursuing [facility] for ECT 3 times a week, medication adjustment  Medical/Physical: NA  Precautions:   Behavioral Orders   Procedures    Code 1 - Restrict to Unit    Code 2 - 1:1 Staff Supervision     For ECT only    Code 2 - 1:1 Staff Supervision     For ECT only    Electroconvulsive therapy     Series of up to 12 treatments. Begin Date: 2/26/20   Treating Psychiatrist providing ECT: Doug Devi MD   Notified on: 2/25/20    Electroconvulsive therapy     Series of up to 12 treatments. Begin Date: 2/26/20   Treating Psychiatrist providing ECT: Doug Devi MD   Notified on: 2/25/20    Electroconvulsive therapy     Series of up to 12 treatments. Begin Date: 2/26/20   Treating Psychiatrist providing ECT: Doug Devi MD   Notified on: 2/25/20    Electroconvulsive therapy     Series of up to 12 treatments. Begin Date: 2/26/20   Treating Psychiatrist providing ECT: Doug Devi MD   Notified on: 2/25/20    Fall precautions    Fall precautions    Routine Programming     As clinically indicated    Status 15     Every 15 minutes.     Plan: encourage group attendance, medication adjustment, pursue [facility]  Rationale for change in precautions or plan: NA

## 2020-03-17 PROCEDURE — 25000132 ZZH RX MED GY IP 250 OP 250 PS 637: Performed by: PSYCHIATRY & NEUROLOGY

## 2020-03-17 PROCEDURE — 12400000 ZZH R&B MH

## 2020-03-17 RX ADMIN — LITHIUM CARBONATE 300 MG: 300 TABLET, EXTENDED RELEASE ORAL at 21:38

## 2020-03-17 RX ADMIN — MIRTAZAPINE 45 MG: 45 TABLET, FILM COATED ORAL at 21:38

## 2020-03-17 RX ADMIN — BREXPIPRAZOLE 3 MG: 1 TABLET ORAL at 07:57

## 2020-03-17 RX ADMIN — VENLAFAXINE HYDROCHLORIDE 150 MG: 150 CAPSULE, EXTENDED RELEASE ORAL at 07:57

## 2020-03-17 RX ADMIN — LITHIUM CARBONATE 300 MG: 300 TABLET, EXTENDED RELEASE ORAL at 07:57

## 2020-03-17 RX ADMIN — RAMELTEON 8 MG: 8 TABLET ORAL at 21:37

## 2020-03-17 RX ADMIN — LAMOTRIGINE 200 MG: 100 TABLET ORAL at 07:57

## 2020-03-17 ASSESSMENT — MIFFLIN-ST. JEOR: SCORE: 1699.68

## 2020-03-17 NOTE — PLAN OF CARE
VSS. A&O. Denies pain. Reports of having chronic thoughts of SI but does not have any plans. Denies hallucinations. Mood is depressed. Flat affect. Med compliant. Went to some groups today. Will continue to monitor.

## 2020-03-17 NOTE — PLAN OF CARE
"  Problem: Depressive Symptoms  Goal: Depressive Symptoms  Description: Signs and symptoms of listed problems will be absent or manageable.  Suicidal ideation,   Depression.   3/16/2020 2308 by Alicia Fu RN  Outcome: Improving  Flowsheets (Taken 3/16/2020 2308)  Depressive Symptoms Assessed: all  Depressive Symptoms Present:   affect   mood  Note: Patient visible for dinner this shift but declined groups. Appears depressed but brightens upon approach. Started lithium today. Talked with her teenage son on the phone this shift. She said it was good to let her son voice his concerns. Patient also visited with her [facility] , \"Jolly\". During staff check in patient said ECT improved her depression \"a little bit\" today. Patient also said her  reminded her to keep in mind her children's feelings rather than focusing on her depression. Patient may apply for disability as well after quitting her job at [employer]. Patient ate 50% of her grill cheese sandwich & soup. Patient ate 100% of her fruit cup, string cheese, milk & hot aron. Endorses thoughts of being dead but not actively suicidal. Contracts for safety.     "

## 2020-03-17 NOTE — PROGRESS NOTES
"[facility]  Psychiatric Progress Note    Length of stay (days): 25        Interim History:   The patient's care was discussed with the treatment team during the daily team meeting and/or staff's chart notes were reviewed.  Staff report: No acute issues overnight.  She states she is still not eating but is drinking.  She was started on lithium yesterday and so far she has not reported any untoward effects.  Staff report that she slept all night.  She does not endorse any fresh self-harm thoughts or plans or remains very flat.    Depression severity scale 0-10 (10=most severe):  Today: 7    She reports ability to maintain safety in the hospital setting however is not able to ensure her own safety if discharged home today.    No homicidal thoughts reported.  No psychotic symptoms reported.    Energy is low, appetite is better, concentration is fair, anhedonia is moderate.    Tolerating medications without side effects.           Medications:       brexpiprazole  3 mg Oral Daily     lamoTRIgine  200 mg Oral Daily     lidocaine   Transdermal Q8H     lithium ER  300 mg Oral Q12H JASMINE     mirtazapine  45 mg Oral At Bedtime     ramelteon  8 mg Oral At Bedtime     venlafaxine  150 mg Oral Daily with breakfast          Allergies:   No Known Allergies       Labs:     No results found for this or any previous visit (from the past 24 hour(s)).       Psychiatric Examination:     /76 (BP Location: Left arm)   Pulse 80   Temp 98.7  F (37.1  C) (Oral)   Resp 18   Ht 1.651 m (5' 5\")   Wt 100.9 kg (222 lb 6.4 oz)   SpO2 99%   BMI 37.01 kg/m    Weight is 222 lbs 6.4 oz  Body mass index is 37.01 kg/m .  Orthostatic Vitals     None      Appearance: awake, alert, adequately groomed, dressed in hospital scrubs and appeared older than stated age  Attitude:  cooperative  Eye Contact:  fair  Mood:  sad  and depressed  Affect:  mood congruent, intensity is blunted and restricted range  Speech:  decreased " prosody  Psychomotor Behavior:  no evidence of tardive dyskinesia, dystonia, or tics  Throught Process:  linear  Associations:  no loose associations  Thought Content:  no evidence of psychotic thought and active suicidal ideation present  Insight:  partial  Judgement:  fair  Oriented to:  time, person, and place  Attention Span and Concentration:  fair  Recent and Remote Memory:  intact          Precautions:     Behavioral Orders   Procedures     Code 1 - Restrict to Unit     Code 2 - 1:1 Staff Supervision     For ECT only     Code 2 - 1:1 Staff Supervision     For ECT only     Electroconvulsive therapy     Series of up to 12 treatments. Begin Date: 2/26/20   Treating Psychiatrist providing ECT: Doug Devi MD   Notified on: 2/25/20     Electroconvulsive therapy     Series of up to 12 treatments. Begin Date: 2/26/20   Treating Psychiatrist providing ECT: Doug Devi MD   Notified on: 2/25/20     Electroconvulsive therapy     Series of up to 12 treatments. Begin Date: 2/26/20   Treating Psychiatrist providing ECT: Doug Devi MD   Notified on: 2/25/20     Electroconvulsive therapy     Series of up to 12 treatments. Begin Date: 2/26/20   Treating Psychiatrist providing ECT: Doug Devi MD   Notified on: 2/25/20     Fall precautions     Fall precautions     Routine Programming     As clinically indicated     Status 15     Every 15 minutes.          DIagnoses:     Major depressive disorder-recurrent, severe  Generalized anxiety disorder  Unspecified personality disorder       Plan:     Continue her current medications.    Treatment with ECT has been resumed while under a [facility].  Treatment notes reviewed and treatment continues to proceed without complications.  The patient is currently allowed treatment twice a week under her current [facility] order which will be scheduled on Mondays and Fridays.  Documentation has been submitted to the court requesting  permission to utilize ECT 3 times a week.    Psychosocial treatments to be addressed with social work consult and groups.    -Once she is agreeable, neuropsychological testing may be helpful to identify any underlying personality characteristics that may be complicating her treatment course.  -Educated on [place] and encouraged to inform her family of this resource  -Patient will be applying for medical assistance    Legal Status: full commitment with [facility]    Disposition: Anticipate transitioning into an intensive residential treatment program after achieving adequate improvement in mood and remission of suicidal thoughts.

## 2020-03-17 NOTE — PROGRESS NOTES
Herbert from our business office stated pt still has active private insurance at this time.  He said there can be some lag time in relation to processing the request to discontinue it.

## 2020-03-18 ENCOUNTER — ANESTHESIA (OUTPATIENT)
Dept: SURGERY | Facility: CLINIC | Age: 35
End: 2020-03-18

## 2020-03-18 ENCOUNTER — ANESTHESIA EVENT (OUTPATIENT)
Dept: SURGERY | Facility: CLINIC | Age: 35
End: 2020-03-18

## 2020-03-18 ENCOUNTER — APPOINTMENT (OUTPATIENT)
Dept: SURGERY | Facility: CLINIC | Age: 35
End: 2020-03-18
Payer: COMMERCIAL

## 2020-03-18 PROCEDURE — 25800030 ZZH RX IP 258 OP 636: Performed by: ANESTHESIOLOGY

## 2020-03-18 PROCEDURE — 25000132 ZZH RX MED GY IP 250 OP 250 PS 637: Performed by: PSYCHIATRY & NEUROLOGY

## 2020-03-18 PROCEDURE — 90870 ELECTROCONVULSIVE THERAPY: CPT

## 2020-03-18 PROCEDURE — 25000125 ZZHC RX 250: Performed by: NURSE ANESTHETIST, CERTIFIED REGISTERED

## 2020-03-18 PROCEDURE — 12400000 ZZH R&B MH

## 2020-03-18 PROCEDURE — 40000671 ZZH STATISTIC ANESTHESIA CASE

## 2020-03-18 PROCEDURE — 25000128 H RX IP 250 OP 636: Performed by: NURSE ANESTHETIST, CERTIFIED REGISTERED

## 2020-03-18 RX ORDER — SODIUM CHLORIDE, SODIUM LACTATE, POTASSIUM CHLORIDE, CALCIUM CHLORIDE 600; 310; 30; 20 MG/100ML; MG/100ML; MG/100ML; MG/100ML
500 INJECTION, SOLUTION INTRAVENOUS CONTINUOUS
Status: DISCONTINUED | OUTPATIENT
Start: 2020-03-18 | End: 2020-03-20

## 2020-03-18 RX ORDER — KETOROLAC TROMETHAMINE 30 MG/ML
INJECTION, SOLUTION INTRAMUSCULAR; INTRAVENOUS PRN
Status: DISCONTINUED | OUTPATIENT
Start: 2020-03-18 | End: 2020-03-18

## 2020-03-18 RX ADMIN — RAMELTEON 8 MG: 8 TABLET ORAL at 21:41

## 2020-03-18 RX ADMIN — BREXPIPRAZOLE 3 MG: 1 TABLET ORAL at 08:24

## 2020-03-18 RX ADMIN — ACETAMINOPHEN 650 MG: 325 TABLET, FILM COATED ORAL at 10:19

## 2020-03-18 RX ADMIN — LITHIUM CARBONATE 300 MG: 300 TABLET, EXTENDED RELEASE ORAL at 21:41

## 2020-03-18 RX ADMIN — SODIUM CHLORIDE, POTASSIUM CHLORIDE, SODIUM LACTATE AND CALCIUM CHLORIDE 500 ML: 600; 310; 30; 20 INJECTION, SOLUTION INTRAVENOUS at 05:59

## 2020-03-18 RX ADMIN — VENLAFAXINE HYDROCHLORIDE 150 MG: 150 CAPSULE, EXTENDED RELEASE ORAL at 08:25

## 2020-03-18 RX ADMIN — METHOHEXITAL SODIUM 100 MG: 500 INJECTION, POWDER, LYOPHILIZED, FOR SOLUTION INTRAMUSCULAR; INTRAVENOUS; RECTAL at 07:00

## 2020-03-18 RX ADMIN — SUCCINYLCHOLINE CHLORIDE 100 MG: 20 INJECTION, SOLUTION INTRAMUSCULAR; INTRAVENOUS; PARENTERAL at 07:00

## 2020-03-18 RX ADMIN — ACETAMINOPHEN 650 MG: 325 TABLET, FILM COATED ORAL at 13:50

## 2020-03-18 RX ADMIN — MIRTAZAPINE 45 MG: 45 TABLET, FILM COATED ORAL at 21:41

## 2020-03-18 RX ADMIN — LITHIUM CARBONATE 300 MG: 300 TABLET, EXTENDED RELEASE ORAL at 08:26

## 2020-03-18 RX ADMIN — KETOROLAC TROMETHAMINE 15 MG: 30 INJECTION, SOLUTION INTRAMUSCULAR at 07:09

## 2020-03-18 RX ADMIN — LAMOTRIGINE 200 MG: 100 TABLET ORAL at 08:25

## 2020-03-18 ASSESSMENT — ACTIVITIES OF DAILY LIVING (ADL)
DRESS: INDEPENDENT
HYGIENE/GROOMING: INDEPENDENT
ORAL_HYGIENE: INDEPENDENT
LAUNDRY: WITH SUPERVISION

## 2020-03-18 ASSESSMENT — LIFESTYLE VARIABLES: TOBACCO_USE: 0

## 2020-03-18 NOTE — PROCEDURES
[facility] ECT Procedure Note     Misty Parham [id number]   34 year old 1985     Patient Status: Inpatient    No Known Allergies    Weight:  222 lbs 6.4 oz    Patient Preparations: Glasses/Contacts removed         Diagnosis:   Major depression     Medications ineffective, Deteriorating fluid/electrolyte/nutritional status, History of good ECT response in one or more previous episodes of illness and currently receiving maintenance  ECT per lucas truong order 2X per week for duration of commitment. Continues to report limited benefit-lithium started yesterday per discussion w/Dr Larson       Pause for the Cause:     Right patient Yes   Right procedure/laterality settings: Yes   Right diagnosis Yes          Intra-Procedure Documentation:     Date:  3/18/2020  Time:  7:01 AM    ECT #    Treatment number this series: 7   Total treatment number: 7   Type of ECT:  Bilateral, standard    ECT Medications administered: Brevital: 100mg  Succinyl Choline: 100mg         Clinical Narrative:     ECT was administered by Thymatron machine.  Pt has been medically cleared for procedure, consent not needed as ECT tx is being done under court order. Side effects, Risks and benefits reviewed. Misty is IP, receiving ECT under price truong order, 2 ECT tx per week for duration of committment M/W schedule until price-truong is amended for more frequent treatment  ECT Strip Summary:   Energy Level: 80 percent  Motor Seizure Duration: 20 seconds  EEG Seizure Duration:  27 seconds    Complications: No    Plan: 7th ECT 3/3/23/2020 per price truong order for duration of committment. Lithium initiated for augmentation    Doug Devi MD

## 2020-03-18 NOTE — ANESTHESIA POSTPROCEDURE EVALUATION
Patient: Misty Parham    * No procedures listed *    Diagnosis:* No pre-op diagnosis entered *  Diagnosis Additional Information: No value filed.    Anesthesia Type:  General    Note:  Anesthesia Post Evaluation    Patient location during evaluation: Bedside  Patient participation: Able to fully participate in evaluation  Level of consciousness: awake and alert  Pain management: adequate  Airway patency: patent  Cardiovascular status: acceptable  Respiratory status: acceptable  Hydration status: acceptable  PONV: none             Last vitals:  Vitals:    03/18/20 0730 03/18/20 0735 03/18/20 0740   BP: 107/76 106/80 107/75   Pulse: 93 86    Resp: 15 10 19   Temp:      SpO2: 96% 97% 93%         Electronically Signed By: Pedro Finley MD  March 18, 2020  8:10 AM

## 2020-03-18 NOTE — PLAN OF CARE
VSS. A&O. Tylenol for headache. Had ECT this am. Has been resting majority of the day. Reports of intermittent thoughts of SI with no plans. Denies hallucinations. Flat affect. Poor appetite today, is drinking boost shakes. Will continue to monitor.

## 2020-03-18 NOTE — PROGRESS NOTES
"[facility]  Psychiatric Progress Note    Length of stay (days): 26        Interim History:   The patient's care was discussed with the treatment team during the daily team meeting and/or staff's chart notes were reviewed.  Staff report: No acute issues overnight.      She states she is eating some.  She states she feels more hopeful and is missing her children.  We spoke about things that she can do to improve her mood and she mentioned painting as something she used to do and was open to pursuing this during OT.  She denies active self-harm thoughts or plans    Depression severity scale 0-10 (10=most severe):  Today: 5    She reports ability to maintain safety in the hospital setting however is not able to ensure her own safety if discharged home today.    No homicidal thoughts reported.  No psychotic symptoms reported.    Energy is low, appetite is better, concentration is fair, anhedonia is moderate.    Tolerating medications without side effects.           Medications:       brexpiprazole  3 mg Oral Daily     lamoTRIgine  200 mg Oral Daily     lidocaine   Transdermal Q8H     lithium ER  300 mg Oral Q12H JASMINE     mirtazapine  45 mg Oral At Bedtime     ramelteon  8 mg Oral At Bedtime     venlafaxine  150 mg Oral Daily with breakfast          Allergies:   No Known Allergies       Labs:     No results found for this or any previous visit (from the past 24 hour(s)).       Psychiatric Examination:     /71   Pulse 86   Temp 98.5  F (36.9  C) (Oral)   Resp 16   Ht 1.651 m (5' 5\")   Wt 100.9 kg (222 lb 6.4 oz)   SpO2 98%   BMI 37.01 kg/m    Weight is 222 lbs 6.4 oz  Body mass index is 37.01 kg/m .  Orthostatic Vitals     None      Appearance: awake, alert, adequately groomed, dressed in hospital scrubs and appeared older than stated age  Attitude:  cooperative  Eye Contact:  fair  Mood:  better  Affect:  mood congruent, intensity is blunted and restricted range  Speech:  decreased " prosody  Psychomotor Behavior:  no evidence of tardive dyskinesia, dystonia, or tics  Throught Process:  linear  Associations:  no loose associations  Thought Content:  no evidence of psychotic thought and active suicidal ideation present  Insight:  fair  Judgement:  fair  Oriented to:  time, person, and place  Attention Span and Concentration:  fair  Recent and Remote Memory:  intact          Precautions:     Behavioral Orders   Procedures     Code 1 - Restrict to Unit     Code 2 - 1:1 Staff Supervision     For ECT only     Code 2 - 1:1 Staff Supervision     For ECT only     Electroconvulsive therapy     Series of up to 12 treatments. Begin Date: 2/26/20   Treating Psychiatrist providing ECT: Doug Devi MD   Notified on: 2/25/20     Electroconvulsive therapy     Series of up to 12 treatments. Begin Date: 2/26/20   Treating Psychiatrist providing ECT: Doug Devi MD   Notified on: 2/25/20     Electroconvulsive therapy     Series of up to 12 treatments. Begin Date: 2/26/20   Treating Psychiatrist providing ECT: Doug Devi MD   Notified on: 2/25/20     Electroconvulsive therapy     Series of up to 12 treatments. Begin Date: 2/26/20   Treating Psychiatrist providing ECT: Doug Devi MD   Notified on: 2/25/20     Electroconvulsive therapy     Series of up to 12 treatments. Begin Date: 2/26/20   Treating Psychiatrist providing ECT: Doug Devi MD   Notified on: 2/25/20     Fall precautions     Fall precautions     Routine Programming     As clinically indicated     Status 15     Every 15 minutes.          DIagnoses:     Major depressive disorder-recurrent, severe  Generalized anxiety disorder  Unspecified personality disorder       Plan:     Continue her current medications.  Check lithium level tomorrow.    Treatment with ECT has been resumed while under a [employer].  Treatment notes reviewed and treatment continues to proceed without complications.   The patient is currently allowed treatment twice a week under her current [employer] order which will be scheduled on Mondays and Fridays.  Documentation has been submitted to the court requesting permission to utilize ECT 3 times a week.    Psychosocial treatments to be addressed with social work consult and groups.    -Once she is agreeable, neuropsychological testing may be helpful to identify any underlying personality characteristics that may be complicating her treatment course.  -Educated on [place] and encouraged to inform her family of this resource  -Patient will be applying for medical assistance    Legal Status: full commitment with [employer]    Disposition: Anticipate transitioning into an intensive residential treatment program after achieving adequate improvement in mood and remission of suicidal thoughts.

## 2020-03-18 NOTE — ANESTHESIA PREPROCEDURE EVALUATION
Anesthesia Pre-Procedure Evaluation    Patient: Misty Parham   MRN: [id number] : 1985          Preoperative Diagnosis: * No pre-op diagnosis entered *    * No procedures listed *    Past Medical History:   Diagnosis Date     Depressive disorder      Past Surgical History:   Procedure Laterality Date     CHOLECYSTECTOMY         Anesthesia Evaluation     . Pt has had prior anesthetic. Type: General    No history of anesthetic complications          ROS/MED HX    ENT/Pulmonary:      (-) tobacco use and sleep apnea   Neurologic:      (-) Delerium   Cardiovascular:     (+) ----. : . . . :. . Previous cardiac testing date:results:date: results:ECG reviewed date:2020 results:NSR date: results:          METS/Exercise Tolerance:  >4 METS   Hematologic:         Musculoskeletal:         GI/Hepatic:        (-) GERD   Renal/Genitourinary:         Endo: Comment: BMI 37    (+) Obesity (BMI 37), .      Psychiatric:     (+) psychiatric history (severe, requring ECT) depression      Infectious Disease:         Malignancy:         Other:    (+) No chance of pregnancy                         Physical Exam  Normal systems: dental    Airway   Mallampati: III  TM distance: >3 FB  Neck ROM: full    Dental     Cardiovascular   Rhythm and rate: regular      Pulmonary    breath sounds clear to auscultation            Lab Results   Component Value Date    WBC 9.2 2020    HGB 12.7 2020    HCT 39.1 2020     2020     2020    POTASSIUM 3.4 2020    CHLORIDE 104 2020    CO2 26 2020    BUN 13 2020    CR 0.87 2020    GLC 92 2020    CRISTINE 9.1 2020    ALBUMIN 3.8 2020    PROTTOTAL 7.6 2020    ALT 15 2020    AST 13 2020    ALKPHOS 78 2020    BILITOTAL 0.4 2020    TSH 1.08 2020    HCG Negative 2020    HCGS Negative 2020       Preop Vitals  BP Readings from Last 3 Encounters:   20 115/78   20 120/88  "  02/18/20 114/83    Pulse Readings from Last 3 Encounters:   03/18/20 102   02/21/20 109   02/18/20 95      Resp Readings from Last 3 Encounters:   03/18/20 16   02/21/20 16   02/18/20 16    SpO2 Readings from Last 3 Encounters:   03/18/20 97%   02/21/20 97%   02/18/20 99%      Temp Readings from Last 1 Encounters:   03/18/20 36.7  C (98  F) (Oral)    Ht Readings from Last 1 Encounters:   02/21/20 1.651 m (5' 5\")      Wt Readings from Last 1 Encounters:   03/17/20 100.9 kg (222 lb 6.4 oz)    Estimated body mass index is 37.01 kg/m  as calculated from the following:    Height as of this encounter: 1.651 m (5' 5\").    Weight as of this encounter: 100.9 kg (222 lb 6.4 oz).       Anesthesia Plan      History & Physical Review  History and physical reviewed and following examination; no interval change.    ASA Status:  2 .    NPO Status:  > 8 hours    Plan for General (Mask ventilation) with Intravenous induction.   PONV prophylaxis:  Ondansetron (or other 5HT-3)         Postoperative Care      Consents  Anesthetic plan, risks, benefits and alternatives discussed with:  Patient..                 Pedro Finley MD  "

## 2020-03-18 NOTE — PLAN OF CARE
"Spent evening in room., declined hs group. Brightens on approach. Conversed well with writer, ate several packages of pat crackers despite stating \"I want to starve myself\". Endorsed thoughts of death, but declined active suicidal ideation, \"not for myself, but because I don't know I could do that to my kids\". Reports highs and lows with ECT, stated today was \"okay\", and did enjoy speaking with her kids. Difficulty identifying coping skills. Displays good fair insight, but poor judgement and motivation. Denies VAH. Compliant with meds.       "

## 2020-03-19 LAB — LITHIUM SERPL-SCNC: 0.55 MMOL/L (ref 0.6–1.2)

## 2020-03-19 PROCEDURE — 25000132 ZZH RX MED GY IP 250 OP 250 PS 637: Performed by: PSYCHIATRY & NEUROLOGY

## 2020-03-19 PROCEDURE — 36415 COLL VENOUS BLD VENIPUNCTURE: CPT | Performed by: PSYCHIATRY & NEUROLOGY

## 2020-03-19 PROCEDURE — 12400000 ZZH R&B MH

## 2020-03-19 PROCEDURE — 80178 ASSAY OF LITHIUM: CPT | Performed by: PSYCHIATRY & NEUROLOGY

## 2020-03-19 RX ORDER — LITHIUM CARBONATE 450 MG
450 TABLET, EXTENDED RELEASE ORAL EVERY 12 HOURS SCHEDULED
Status: DISCONTINUED | OUTPATIENT
Start: 2020-03-19 | End: 2020-04-25 | Stop reason: HOSPADM

## 2020-03-19 RX ADMIN — LITHIUM CARBONATE 300 MG: 300 TABLET, EXTENDED RELEASE ORAL at 09:11

## 2020-03-19 RX ADMIN — RAMELTEON 8 MG: 8 TABLET ORAL at 21:07

## 2020-03-19 RX ADMIN — BREXPIPRAZOLE 3 MG: 1 TABLET ORAL at 09:10

## 2020-03-19 RX ADMIN — LAMOTRIGINE 200 MG: 100 TABLET ORAL at 09:10

## 2020-03-19 RX ADMIN — VENLAFAXINE HYDROCHLORIDE 150 MG: 150 CAPSULE, EXTENDED RELEASE ORAL at 09:10

## 2020-03-19 RX ADMIN — LITHIUM CARBONATE 450 MG: 450 TABLET, EXTENDED RELEASE ORAL at 21:07

## 2020-03-19 RX ADMIN — MIRTAZAPINE 45 MG: 45 TABLET, FILM COATED ORAL at 21:07

## 2020-03-19 ASSESSMENT — ACTIVITIES OF DAILY LIVING (ADL)
HYGIENE/GROOMING: INDEPENDENT
ORAL_HYGIENE: INDEPENDENT
HYGIENE/GROOMING: INDEPENDENT
ORAL_HYGIENE: INDEPENDENT
DRESS: INDEPENDENT
LAUNDRY: WITH SUPERVISION
DRESS: INDEPENDENT
LAUNDRY: WITH SUPERVISION

## 2020-03-19 NOTE — PLAN OF CARE
Problem: Adult Behavioral Health Plan of Care  Goal: Patient-Specific Goal (Individualization)  Description: 1. Absence of suicidal ideation with safety plan in place.  2. Effective medication regime with patient compliance.  3. Stabilization of mood and thought processes.  4. Improved insight into mental health issues.  5. Able to identify and utilize positive coping skills.  6. Plan in place for ongoing treatment and support.     Outcome: No Change  Note: Pt has been spending time in her room isolating most of the shift. Pt remains depressed but slightly better. Pt still has some SI but feels like it has improved a little bit. Pt denies any plans for SI but just wishes she wouldn't wake up. Pt ate breakfast this morning and had little at lunch. Nursing to continue to monitor.

## 2020-03-19 NOTE — PLAN OF CARE
Patient was monitored via in-person 15 minute checks and appeared asleep throughout the shift. No safety concerns noted. Will continue to monitor.

## 2020-03-19 NOTE — PROGRESS NOTES
"[facility]  Psychiatric Progress Note    Length of stay (days): 27        Interim History:   The patient's care was discussed with the treatment team during the daily team meeting and/or staff's chart notes were reviewed.  Staff report: No acute issues overnight.      She states she is a little better but did not attend outpatient therapy yesterday.  She was encouraged to pursue more for social activities and she verbalized understanding and willingness.  Her serum lithium level was checked this morning and it came back as 0.55 mmol/L which gives us the opportunity to optimize the dose.  She denies active self-harm thoughts or plans    Depression severity scale 0-10 (10=most severe):  Today: 5    She reports ability to maintain safety in the hospital setting however is not able to ensure her own safety if discharged home today.    No homicidal thoughts reported.  No psychotic symptoms reported.    Energy is low, appetite is better, concentration is fair, anhedonia is moderate.    Tolerating medications without side effects.           Medications:       brexpiprazole  3 mg Oral Daily     lamoTRIgine  200 mg Oral Daily     lidocaine   Transdermal Q8H     lithium ER  300 mg Oral Q12H JASMINE     mirtazapine  45 mg Oral At Bedtime     ramelteon  8 mg Oral At Bedtime     venlafaxine  150 mg Oral Daily with breakfast          Allergies:   No Known Allergies       Labs:     Recent Results (from the past 24 hour(s))   Lithium level    Collection Time: 03/19/20  9:12 AM   Result Value Ref Range    Lithium Level 0.55 (L) 0.60 - 1.20 mmol/L          Psychiatric Examination:     /75   Pulse 76   Temp 99  F (37.2  C) (Oral)   Resp 16   Ht 1.651 m (5' 5\")   Wt 100.9 kg (222 lb 6.4 oz)   SpO2 95%   BMI 37.01 kg/m    Weight is 222 lbs 6.4 oz  Body mass index is 37.01 kg/m .  Orthostatic Vitals     None      Appearance: awake, alert, adequately groomed, dressed in hospital scrubs and appeared older than " stated age  Attitude:  cooperative  Eye Contact:  fair  Mood:  better  Affect:  mood congruent, intensity is blunted and restricted range  Speech:  clear, coherent and decreased prosody  Psychomotor Behavior:  no evidence of tardive dyskinesia, dystonia, or tics  Throught Process:  linear  Associations:  no loose associations  Thought Content:  no evidence of psychotic thought and active suicidal ideation present  Insight:  fair  Judgement:  fair  Oriented to:  time, person, and place  Attention Span and Concentration:  fair  Recent and Remote Memory:  intact          Precautions:     Behavioral Orders   Procedures     Code 1 - Restrict to Unit     Code 2 - 1:1 Staff Supervision     For ECT only     Code 2 - 1:1 Staff Supervision     For ECT only     Electroconvulsive therapy     Series of up to 12 treatments. Begin Date: 2/26/20   Treating Psychiatrist providing ECT: Doug Devi MD   Notified on: 2/25/20     Electroconvulsive therapy     Series of up to 12 treatments. Begin Date: 2/26/20   Treating Psychiatrist providing ECT: Doug Devi MD   Notified on: 2/25/20     Electroconvulsive therapy     Series of up to 12 treatments. Begin Date: 2/26/20   Treating Psychiatrist providing ECT: Doug Devi MD   Notified on: 2/25/20     Electroconvulsive therapy     Series of up to 12 treatments. Begin Date: 2/26/20   Treating Psychiatrist providing ECT: Doug Devi MD   Notified on: 2/25/20     Electroconvulsive therapy     Series of up to 12 treatments. Begin Date: 2/26/20   Treating Psychiatrist providing ECT: Doug Devi MD   Notified on: 2/25/20     Fall precautions     Fall precautions     Routine Programming     As clinically indicated     Status 15     Every 15 minutes.          DIagnoses:     Major depressive disorder-recurrent, severe  Generalized anxiety disorder  Unspecified personality disorder       Plan:     Continue her current medications.   Increase lithium carbonate to 450 mg twice daily.    Treatment with ECT has been resumed while under a [employer].  Treatment notes reviewed and treatment continues to proceed without complications.  The patient is currently allowed treatment twice a week under her current [employer] order which will be scheduled on Mondays and Fridays.  Documentation has been submitted to the court requesting permission to utilize ECT 3 times a week.    Psychosocial treatments to be addressed with social work consult and groups.    -Once she is agreeable, neuropsychological testing may be helpful to identify any underlying personality characteristics that may be complicating her treatment course.  -Educated on [place] and encouraged to inform her family of this resource  -Patient will be applying for medical assistance    Legal Status: full commitment with [employer]    Disposition: Anticipate transitioning into an intensive residential treatment program after achieving adequate improvement in mood and remission of suicidal thoughts.

## 2020-03-19 NOTE — PROGRESS NOTES
Had phone conversation with pt's [employer] CM, Samreen Machuca.   She is submitting the 60-90 report to court this week.   I offered my assistance.  When pt eventually gets switched over from private ins through her employer, to [place], the IRT's referrals will be made to:  [facility], [facility], and [facility].

## 2020-03-19 NOTE — PLAN OF CARE
"Pt spent evening in room and did not attend groups. Pt presents with flat affect and depressed mood. Stated she had a headache today and wanted to rest. Pt denies active SI but endorsed thoughts of death. Reports ECT today was \"okay\". Difficulty coping. Pts insight fair and judgment poor. Med compliant.    "

## 2020-03-20 PROCEDURE — 25000132 ZZH RX MED GY IP 250 OP 250 PS 637: Performed by: PSYCHIATRY & NEUROLOGY

## 2020-03-20 PROCEDURE — 12400000 ZZH R&B MH

## 2020-03-20 RX ADMIN — BREXPIPRAZOLE 3 MG: 1 TABLET ORAL at 08:57

## 2020-03-20 RX ADMIN — RAMELTEON 8 MG: 8 TABLET ORAL at 20:31

## 2020-03-20 RX ADMIN — MIRTAZAPINE 45 MG: 45 TABLET, FILM COATED ORAL at 20:31

## 2020-03-20 RX ADMIN — LAMOTRIGINE 200 MG: 100 TABLET ORAL at 08:58

## 2020-03-20 RX ADMIN — LITHIUM CARBONATE 450 MG: 450 TABLET, EXTENDED RELEASE ORAL at 20:31

## 2020-03-20 RX ADMIN — VENLAFAXINE HYDROCHLORIDE 150 MG: 150 CAPSULE, EXTENDED RELEASE ORAL at 08:59

## 2020-03-20 RX ADMIN — LITHIUM CARBONATE 450 MG: 450 TABLET, EXTENDED RELEASE ORAL at 08:59

## 2020-03-20 ASSESSMENT — ACTIVITIES OF DAILY LIVING (ADL)
ORAL_HYGIENE: INDEPENDENT
HYGIENE/GROOMING: INDEPENDENT
LAUNDRY: WITH SUPERVISION
DRESS: INDEPENDENT
HYGIENE/GROOMING: INDEPENDENT
ORAL_HYGIENE: INDEPENDENT
DRESS: INDEPENDENT

## 2020-03-20 NOTE — PLAN OF CARE
Pt appears to have a brighter affect compared to when I last saw her 2 weeks ago. Pt was up in hallway speaking to her son on the phone, up to lounge for supper without prompting, ate most of her meal, watched TV in lounge with peers. Pt attended Activity group; declined Wrap-up group. Regarding pt's 3/19 Lith level .55; doctor has increased the dose.

## 2020-03-20 NOTE — PLAN OF CARE
Problem: Adult Behavioral Health Plan of Care  Goal: Patient-Specific Goal (Individualization)  Description: 1. Absence of suicidal ideation with safety plan in place.  2. Effective medication regime with patient compliance.  3. Stabilization of mood and thought processes.  4. Improved insight into mental health issues.  5. Able to identify and utilize positive coping skills.  6. Plan in place for ongoing treatment and support.     Outcome: No Change  Note: Pt has been present on the unit and has been attending unit programing. Pt remain flat and depressed but does brighten up on approach. Pt still has some SI but claims it is getting better. Pt has no plan. Pt has been eating her meals and taking fluids. Pt feel better after ECT and talked with staff about increasing the frequency of txs. Staff discussed with pt about talking with the dr about having more ECT. Pt is pleasant and cooperative. Nursing to continue to monitor.

## 2020-03-20 NOTE — PLAN OF CARE
Pt spent most of shift isolated to her room, but ate in the lounge and would occasionally spend time in the lounge on the phone. Pt did attend unit programming. Pt presents with depressed mood and flat affect. Pt ate all of her dinner and has been drinking with prompts. Pt has suicidal thoughts but have been slightly improving; no plan.   Lithium level is 0.55 subtherapeutic.

## 2020-03-20 NOTE — PROGRESS NOTES
"[facility]  Psychiatric Progress Note    Length of stay (days): 28        Interim History:   The patient's care was discussed with the treatment team during the daily team meeting and/or staff's chart notes were reviewed.  Staff report: No acute issues overnight.      Staff report that she had an uneventful night.  She tells me she is doing fairly well and denies experiencing any self-harm thoughts plans or intent.  She states she did some coloring yesterday and has participated in group therapy today.  We discussed the option of going up on her ECT treatments and she indicates that she is willing to authorize a 3-day week schedule for ECT if it takes too long to get word back from the court regarding the [employer] application as long as it would help her mood.  She appears a little bit more motivated and somewhat leery of reporting improvement.    Depression severity scale 0-10 (10=most severe):  Today: 5    She reports ability to maintain safety in the hospital setting however is not able to ensure her own safety if discharged home today.    No homicidal thoughts reported.  No psychotic symptoms reported.    Energy is low, appetite is better, concentration is fair, anhedonia is moderate.    Tolerating medications without side effects.           Medications:       brexpiprazole  3 mg Oral Daily     lamoTRIgine  200 mg Oral Daily     lithium ER  450 mg Oral Q12H JASMINE     mirtazapine  45 mg Oral At Bedtime     ramelteon  8 mg Oral At Bedtime     venlafaxine  150 mg Oral Daily with breakfast          Allergies:   No Known Allergies       Labs:     No results found for this or any previous visit (from the past 24 hour(s)).       Psychiatric Examination:     BP 96/68   Pulse 75   Temp 98.2  F (36.8  C) (Oral)   Resp 16   Ht 1.651 m (5' 5\")   Wt 100.9 kg (222 lb 6.4 oz)   SpO2 96%   BMI 37.01 kg/m    Weight is 222 lbs 6.4 oz  Body mass index is 37.01 kg/m .  Orthostatic Vitals     None    "   Appearance: awake, alert, adequately groomed, dressed in hospital scrubs and appeared older than stated age  Attitude:  cooperative  Eye Contact:  fair  Mood:  better  Affect:  mood congruent, intensity is blunted and restricted range  Speech:  clear, coherent and decreased prosody  Psychomotor Behavior:  no evidence of tardive dyskinesia, dystonia, or tics  Throught Process:  linear  Associations:  no loose associations  Thought Content:  no evidence of psychotic thought and active suicidal ideation present  Insight:  fair  Judgement:  fair  Oriented to:  time, person, and place  Attention Span and Concentration:  fair  Recent and Remote Memory:  intact          Precautions:     Behavioral Orders   Procedures     Code 1 - Restrict to Unit     Code 2 - 1:1 Staff Supervision     For ECT only     Code 2 - 1:1 Staff Supervision     For ECT only     Electroconvulsive therapy     Series of up to 12 treatments. Begin Date: 2/26/20   Treating Psychiatrist providing ECT: Doug Devi MD   Notified on: 2/25/20     Electroconvulsive therapy     Series of up to 12 treatments. Begin Date: 2/26/20   Treating Psychiatrist providing ECT: Doug Devi MD   Notified on: 2/25/20     Electroconvulsive therapy     Series of up to 12 treatments. Begin Date: 2/26/20   Treating Psychiatrist providing ECT: Doug Devi MD   Notified on: 2/25/20     Electroconvulsive therapy     Series of up to 12 treatments. Begin Date: 2/26/20   Treating Psychiatrist providing ECT: Doug Devi MD   Notified on: 2/25/20     Electroconvulsive therapy     Series of up to 12 treatments. Begin Date: 2/26/20   Treating Psychiatrist providing ECT: Doug Devi MD   Notified on: 2/25/20     Fall precautions     Fall precautions     Routine Programming     As clinically indicated     Status 15     Every 15 minutes.          DIagnoses:     Major depressive disorder-recurrent, severe  Generalized anxiety  disorder  Unspecified personality disorder       Plan:     Continue her current medications.     Treatment with ECT has been resumed while under a [employer].  Treatment notes reviewed and treatment continues to proceed without complications.  The patient is currently allowed treatment twice a week under her current [facility] order which will be scheduled on Mondays and Fridays.  Documentation has been submitted to the court requesting permission to utilize ECT 3 times a week.    Psychosocial treatments to be addressed with social work consult and groups.    -Once she is agreeable, neuropsychological testing may be helpful to identify any underlying personality characteristics that may be complicating her treatment course.  -Educated on [place] and encouraged to inform her family of this resource  -Patient will be applying for medical assistance    Legal Status: full commitment with [employer]    Disposition: Anticipate transitioning into an intensive residential treatment program after achieving adequate improvement in mood and remission of suicidal thoughts.

## 2020-03-21 PROCEDURE — 25000132 ZZH RX MED GY IP 250 OP 250 PS 637: Performed by: PSYCHIATRY & NEUROLOGY

## 2020-03-21 PROCEDURE — 12400000 ZZH R&B MH

## 2020-03-21 RX ADMIN — LITHIUM CARBONATE 450 MG: 450 TABLET, EXTENDED RELEASE ORAL at 19:16

## 2020-03-21 RX ADMIN — RAMELTEON 8 MG: 8 TABLET ORAL at 20:08

## 2020-03-21 RX ADMIN — LITHIUM CARBONATE 450 MG: 450 TABLET, EXTENDED RELEASE ORAL at 09:14

## 2020-03-21 RX ADMIN — VENLAFAXINE HYDROCHLORIDE 150 MG: 150 CAPSULE, EXTENDED RELEASE ORAL at 09:15

## 2020-03-21 RX ADMIN — BREXPIPRAZOLE 3 MG: 1 TABLET ORAL at 09:14

## 2020-03-21 RX ADMIN — MIRTAZAPINE 45 MG: 45 TABLET, FILM COATED ORAL at 20:08

## 2020-03-21 RX ADMIN — LAMOTRIGINE 200 MG: 100 TABLET ORAL at 09:14

## 2020-03-21 ASSESSMENT — ACTIVITIES OF DAILY LIVING (ADL)
HYGIENE/GROOMING: INDEPENDENT
ORAL_HYGIENE: INDEPENDENT
DRESS: INDEPENDENT
DRESS: INDEPENDENT
HYGIENE/GROOMING: INDEPENDENT
ORAL_HYGIENE: INDEPENDENT

## 2020-03-21 NOTE — PLAN OF CARE
Shift Update: Pt mood is sad. Thought process is impaired. Insight is poor. Pt denies suicidal ideation. Pt had better affect in AM and then became worse in the pm. Pt states her moods go up and down and this writer asked her to explain and she said her thoughts are sometimes  positive and sometimes negative like telling her to hurt herself or fleeting SI. She does contract for safety. Over all she said her mood is improved and has been talking to her family on the phone. Pt ate pancakes for breakfast and 1/4 of her lunch and 8 oz of boost.

## 2020-03-22 PROCEDURE — 25000132 ZZH RX MED GY IP 250 OP 250 PS 637: Performed by: PSYCHIATRY & NEUROLOGY

## 2020-03-22 PROCEDURE — 12400000 ZZH R&B MH

## 2020-03-22 RX ADMIN — LITHIUM CARBONATE 450 MG: 450 TABLET, EXTENDED RELEASE ORAL at 21:00

## 2020-03-22 RX ADMIN — BREXPIPRAZOLE 3 MG: 1 TABLET ORAL at 08:13

## 2020-03-22 RX ADMIN — LAMOTRIGINE 200 MG: 100 TABLET ORAL at 08:13

## 2020-03-22 RX ADMIN — LITHIUM CARBONATE 450 MG: 450 TABLET, EXTENDED RELEASE ORAL at 08:13

## 2020-03-22 RX ADMIN — MIRTAZAPINE 45 MG: 45 TABLET, FILM COATED ORAL at 21:00

## 2020-03-22 RX ADMIN — VENLAFAXINE HYDROCHLORIDE 150 MG: 150 CAPSULE, EXTENDED RELEASE ORAL at 08:13

## 2020-03-22 RX ADMIN — RAMELTEON 8 MG: 8 TABLET ORAL at 21:00

## 2020-03-22 ASSESSMENT — ACTIVITIES OF DAILY LIVING (ADL)
LAUNDRY: WITH SUPERVISION
DRESS: INDEPENDENT
LAUNDRY: WITH SUPERVISION
HYGIENE/GROOMING: INDEPENDENT
HYGIENE/GROOMING: INDEPENDENT
DRESS: INDEPENDENT
ORAL_HYGIENE: INDEPENDENT
ORAL_HYGIENE: INDEPENDENT

## 2020-03-22 NOTE — PLAN OF CARE
Patient pleasant and cooperative. Attended group and participated appropriately. Compliant with medications.

## 2020-03-22 NOTE — PLAN OF CARE
"  Problem: Depressive Symptoms  Goal: Depressive Symptoms  Description: Signs and symptoms of listed problems will be absent or manageable.  Suicidal ideation,   Depression.   3/22/2020 1142 by Ivana Serrano  Outcome: No Change  Flowsheets (Taken 3/22/2020 1142)  Depressive Symptoms Assessed: all  Depressive Symptoms Present:   affect   mood   anxiety  Note: Pt presents with a flat affect, brightens upon approach, and a calm to anxious mood. Pt thought process and insight is improving. Pt is mostly withdrawn to her room this shift, but does attend unit programming with appropriate participation. Pt states she is doing okay but that she is missing her family. Pt states she is fighting the urge \"to not eat\" and that \"not eating is the only thing I can do in here [to hurt myself]\". Pt endorses chronic Si with no plan. Pt also states that she is a bit worried that she won't remember the conversation with her doctor tomorrow because of her ECT in the morning.     "

## 2020-03-23 ENCOUNTER — ANESTHESIA (OUTPATIENT)
Dept: SURGERY | Facility: CLINIC | Age: 35
End: 2020-03-23

## 2020-03-23 ENCOUNTER — ANESTHESIA EVENT (OUTPATIENT)
Dept: SURGERY | Facility: CLINIC | Age: 35
End: 2020-03-23

## 2020-03-23 ENCOUNTER — APPOINTMENT (OUTPATIENT)
Dept: SURGERY | Facility: CLINIC | Age: 35
End: 2020-03-23
Payer: COMMERCIAL

## 2020-03-23 PROCEDURE — 25000128 H RX IP 250 OP 636: Performed by: ANESTHESIOLOGY

## 2020-03-23 PROCEDURE — 25000132 ZZH RX MED GY IP 250 OP 250 PS 637: Performed by: PSYCHIATRY & NEUROLOGY

## 2020-03-23 PROCEDURE — 25000125 ZZHC RX 250: Performed by: NURSE ANESTHETIST, CERTIFIED REGISTERED

## 2020-03-23 PROCEDURE — 40000671 ZZH STATISTIC ANESTHESIA CASE

## 2020-03-23 PROCEDURE — 90870 ELECTROCONVULSIVE THERAPY: CPT

## 2020-03-23 PROCEDURE — 12400000 ZZH R&B MH

## 2020-03-23 PROCEDURE — 25000128 H RX IP 250 OP 636: Performed by: NURSE ANESTHETIST, CERTIFIED REGISTERED

## 2020-03-23 RX ORDER — KETOROLAC TROMETHAMINE 30 MG/ML
30 INJECTION, SOLUTION INTRAMUSCULAR; INTRAVENOUS ONCE
Status: COMPLETED | OUTPATIENT
Start: 2020-03-23 | End: 2020-03-23

## 2020-03-23 RX ORDER — SODIUM CHLORIDE, SODIUM LACTATE, POTASSIUM CHLORIDE, CALCIUM CHLORIDE 600; 310; 30; 20 MG/100ML; MG/100ML; MG/100ML; MG/100ML
500 INJECTION, SOLUTION INTRAVENOUS CONTINUOUS
Status: DISCONTINUED | OUTPATIENT
Start: 2020-03-23 | End: 2020-03-30

## 2020-03-23 RX ADMIN — MIRTAZAPINE 45 MG: 45 TABLET, FILM COATED ORAL at 20:35

## 2020-03-23 RX ADMIN — VENLAFAXINE HYDROCHLORIDE 150 MG: 150 CAPSULE, EXTENDED RELEASE ORAL at 08:03

## 2020-03-23 RX ADMIN — LAMOTRIGINE 200 MG: 100 TABLET ORAL at 08:03

## 2020-03-23 RX ADMIN — LITHIUM CARBONATE 450 MG: 450 TABLET, EXTENDED RELEASE ORAL at 20:35

## 2020-03-23 RX ADMIN — LITHIUM CARBONATE 450 MG: 450 TABLET, EXTENDED RELEASE ORAL at 08:03

## 2020-03-23 RX ADMIN — KETOROLAC TROMETHAMINE 30 MG: 30 INJECTION, SOLUTION INTRAMUSCULAR at 06:48

## 2020-03-23 RX ADMIN — ACETAMINOPHEN 650 MG: 325 TABLET, FILM COATED ORAL at 12:54

## 2020-03-23 RX ADMIN — SUCCINYLCHOLINE CHLORIDE 100 MG: 20 INJECTION, SOLUTION INTRAMUSCULAR; INTRAVENOUS; PARENTERAL at 06:38

## 2020-03-23 RX ADMIN — RAMELTEON 8 MG: 8 TABLET ORAL at 20:36

## 2020-03-23 RX ADMIN — BREXPIPRAZOLE 3 MG: 1 TABLET ORAL at 08:03

## 2020-03-23 RX ADMIN — ACETAMINOPHEN 650 MG: 325 TABLET, FILM COATED ORAL at 19:22

## 2020-03-23 RX ADMIN — METHOHEXITAL SODIUM 100 MG: 500 INJECTION, POWDER, LYOPHILIZED, FOR SOLUTION INTRAMUSCULAR; INTRAVENOUS; RECTAL at 06:38

## 2020-03-23 ASSESSMENT — ACTIVITIES OF DAILY LIVING (ADL)
HYGIENE/GROOMING: INDEPENDENT
DRESS: INDEPENDENT
LAUNDRY: WITH SUPERVISION
ORAL_HYGIENE: INDEPENDENT

## 2020-03-23 ASSESSMENT — LIFESTYLE VARIABLES: TOBACCO_USE: 0

## 2020-03-23 NOTE — PLAN OF CARE
BEHAVIORAL TEAM DISCUSSION    Participants: MD, RN, CM, PA, OT   Progress: Improving   Anticipated length of stay: Unknown. [facility] hearing scheduled on 04/03.   Continued Stay Criteria/Rationale: Continued psychiatric stabilization. ECT. Medication adjustment.   Medical/Physical: Per hospitalist consult  Precautions:   Behavioral Orders   Procedures    Code 1 - Restrict to Unit    Code 2 - 1:1 Staff Supervision     For ECT only    Code 2 - 1:1 Staff Supervision     For ECT only    Electroconvulsive therapy     Series of up to 12 treatments. Begin Date: 2/26/20   Treating Psychiatrist providing ECT: Doug Devi MD   Notified on: 2/25/20    Electroconvulsive therapy     Series of up to 12 treatments. Begin Date: 2/26/20   Treating Psychiatrist providing ECT: Doug Devi MD   Notified on: 2/25/20    Electroconvulsive therapy     Series of up to 12 treatments. Begin Date: 2/26/20   Treating Psychiatrist providing ECT: Doug Devi MD   Notified on: 2/25/20    Electroconvulsive therapy     Series of up to 12 treatments. Begin Date: 2/26/20   Treating Psychiatrist providing ECT: Doug Devi MD   Notified on: 2/25/20    Electroconvulsive therapy     Series of up to 12 treatments. Begin Date: 2/26/20   Treating Psychiatrist providing ECT: Doug Devi MD   Notified on: 2/25/20    Electroconvulsive therapy     Series of up to 12 treatments. Begin Date: 2/26/20   Treating Psychiatrist providing ECT: Doug Devi MD   Notified on: 2/25/20    Fall precautions    Fall precautions    Routine Programming     As clinically indicated    Status 15     Every 15 minutes.     Plan:  Refer patient to IRTS for continued stabilization at discharge once she has MA in place. Continue current medications and ECT schedule.     Rationale for change in precautions or plan: Continued stabilization.

## 2020-03-23 NOTE — ANESTHESIA PREPROCEDURE EVALUATION
Anesthesia Pre-Procedure Evaluation    Patient: Misty Parham   MRN: [id number] : 1985          Preoperative Diagnosis: * No pre-op diagnosis entered *    * No procedures listed *    Past Medical History:   Diagnosis Date     Depressive disorder      Past Surgical History:   Procedure Laterality Date     CHOLECYSTECTOMY         Anesthesia Evaluation     . Pt has had prior anesthetic. Type: General    No history of anesthetic complications          ROS/MED HX    ENT/Pulmonary:      (-) tobacco use and sleep apnea   Neurologic:      (-) Delerium   Cardiovascular:  - neg cardiovascular ROS   (+) ----. : . . . :. . Previous cardiac testing date:results:date: results:ECG reviewed date:2020 results:NSR date: results:         (-) hypertension   METS/Exercise Tolerance:  >4 METS   Hematologic:         Musculoskeletal:         GI/Hepatic:        (-) GERD   Renal/Genitourinary:  - ROS Renal section negative       Endo: Comment: BMI 37    (+) Obesity (BMI 37), .      Psychiatric:     (+) psychiatric history (severe, requring ECT) depression      Infectious Disease:         Malignancy:         Other:    (+) No chance of pregnancy                           Physical Exam  Normal systems: dental    Airway   Mallampati: III  TM distance: >3 FB  Neck ROM: full    Dental     Cardiovascular   Rhythm and rate: regular      Pulmonary    breath sounds clear to auscultation            Lab Results   Component Value Date    WBC 9.2 2020    HGB 12.7 2020    HCT 39.1 2020     2020     2020    POTASSIUM 3.4 2020    CHLORIDE 104 2020    CO2 26 2020    BUN 13 2020    CR 0.87 2020    GLC 92 2020    CRISTINE 9.1 2020    ALBUMIN 3.8 2020    PROTTOTAL 7.6 2020    ALT 15 2020    AST 13 2020    ALKPHOS 78 2020    BILITOTAL 0.4 2020    TSH 1.08 2020    HCG Negative 2020    HCGS Negative 2020       Preop  "Vitals  BP Readings from Last 3 Encounters:   03/23/20 106/83   02/21/20 120/88   02/18/20 114/83    Pulse Readings from Last 3 Encounters:   03/23/20 78   02/21/20 109   02/18/20 95      Resp Readings from Last 3 Encounters:   03/23/20 20   02/21/20 16   02/18/20 16    SpO2 Readings from Last 3 Encounters:   03/23/20 97%   02/21/20 97%   02/18/20 99%      Temp Readings from Last 1 Encounters:   03/23/20 36.5  C (97.7  F) (Tympanic)    Ht Readings from Last 1 Encounters:   02/21/20 1.651 m (5' 5\")      Wt Readings from Last 1 Encounters:   03/17/20 100.9 kg (222 lb 6.4 oz)    Estimated body mass index is 37.01 kg/m  as calculated from the following:    Height as of 2/21/20: 1.651 m (5' 5\").    Weight as of 3/17/20: 100.9 kg (222 lb 6.4 oz).       Anesthesia Plan      History & Physical Review  History and physical reviewed and following examination; no interval change.    ASA Status:  2 .    NPO Status:  > 8 hours    Plan for General (Mask ventilation) with Intravenous induction.   PONV prophylaxis:  Ondansetron (or other 5HT-3)         Postoperative Care  Postoperative pain management:  Multi-modal analgesia.      Consents  Anesthetic plan, risks, benefits and alternatives discussed with:  Patient..                   Luiza Lima MD  "

## 2020-03-23 NOTE — PLAN OF CARE
Pt presents with flat affect and calm, depressed mood. Thought process and insight improving. Pt has been more social with peers and staff. Mostly withdrawn to room but did spend time in lounge. Pt mentioned missing her family. Pt ate her dinner. Endorses chronic SI but states she has no plan, med compliant.

## 2020-03-23 NOTE — PROGRESS NOTES
"[facility]  Psychiatric Progress Note    Length of stay (days): 31        Interim History:   The patient's care was discussed with the treatment team during the daily team meeting and/or staff's chart notes were reviewed.  Staff report: No acute issues overnight.      Patient had ECT this morning.  She reports that she is missing her children.  We discussed prosocial activities and methods of combating her intrusive negative thoughts.  She currently does not report any active self-harm thoughts or plans.  She asked when she could transition to an intensive residential treatment services facility and she was advised that her insurance from the state has to be in place first before this can be facilitated.  She verbalized understanding.  She cannot tell if she has experienced any significant improvement since she started taking lithium carbonate.    Depression severity scale 0-10 (10=most severe):  Today: 5    She reports ability to maintain safety in the hospital setting however is not able to ensure her own safety if discharged home today.    No homicidal thoughts reported.  No psychotic symptoms reported.    Energy is better, appetite is better, concentration is fair, anhedonia is moderate.    Tolerating medications without side effects.           Medications:       brexpiprazole  3 mg Oral Daily     lamoTRIgine  200 mg Oral Daily     lidocaine   Transdermal Q8H     lithium ER  450 mg Oral Q12H JASMINE     mirtazapine  45 mg Oral At Bedtime     ramelteon  8 mg Oral At Bedtime     venlafaxine  150 mg Oral Daily with breakfast          Allergies:   No Known Allergies       Labs:     No results found for this or any previous visit (from the past 24 hour(s)).       Psychiatric Examination:     /77   Pulse 94   Temp 98  F (36.7  C) (Oral)   Resp 14   Ht 1.651 m (5' 5\")   Wt 100.9 kg (222 lb 6.4 oz)   SpO2 98%   BMI 37.01 kg/m    Weight is 222 lbs 6.4 oz  Body mass index is 37.01 " kg/m .  Orthostatic Vitals     None      Appearance: awake, alert, adequately groomed, dressed in hospital scrubs and appeared older than stated age  Attitude:  cooperative  Eye Contact:  fair  Mood:  better  Affect:  mood congruent, intensity is blunted and restricted range  Speech:  clear, coherent and decreased prosody  Psychomotor Behavior:  no evidence of tardive dyskinesia, dystonia, or tics  Throught Process:  linear  Associations:  no loose associations  Thought Content:  no evidence of psychotic thought and active suicidal ideation present  Insight:  fair  Judgement:  fair  Oriented to:  time, person, and place  Attention Span and Concentration:  fair  Recent and Remote Memory:  intact          Precautions:     Behavioral Orders   Procedures     Code 1 - Restrict to Unit     Code 2 - 1:1 Staff Supervision     For ECT only     Code 2 - 1:1 Staff Supervision     For ECT only     Electroconvulsive therapy     Series of up to 12 treatments. Begin Date: 2/26/20   Treating Psychiatrist providing ECT: Doug Devi MD   Notified on: 2/25/20     Electroconvulsive therapy     Series of up to 12 treatments. Begin Date: 2/26/20   Treating Psychiatrist providing ECT: Doug Devi MD   Notified on: 2/25/20     Electroconvulsive therapy     Series of up to 12 treatments. Begin Date: 2/26/20   Treating Psychiatrist providing ECT: Doug Devi MD   Notified on: 2/25/20     Electroconvulsive therapy     Series of up to 12 treatments. Begin Date: 2/26/20   Treating Psychiatrist providing ECT: Doug Devi MD   Notified on: 2/25/20     Electroconvulsive therapy     Series of up to 12 treatments. Begin Date: 2/26/20   Treating Psychiatrist providing ECT: Doug Devi MD   Notified on: 2/25/20     Electroconvulsive therapy     Series of up to 12 treatments. Begin Date: 2/26/20   Treating Psychiatrist providing ECT: Doug Devi MD   Notified on: 2/25/20      Fall precautions     Fall precautions     Routine Programming     As clinically indicated     Status 15     Every 15 minutes.          DIagnoses:     Major depressive disorder-recurrent, severe  Generalized anxiety disorder  Unspecified personality disorder       Plan:     Continue her current medications.     Treatment with ECT has been resumed while under a [facility].  Treatment notes reviewed and treatment continues to proceed without complications.  The patient is currently allowed treatment twice a week under her current [facility] order which will be scheduled on Mondays and Fridays.  Documentation has been submitted to the court requesting permission to utilize ECT 3 times a week.    Psychosocial treatments to be addressed with social work consult and groups.    -Once she is agreeable, neuropsychological testing may be helpful to identify any underlying personality characteristics that may be complicating her treatment course.  -Patient is applying for medical assistance.    Legal Status: full commitment with [facility]    Disposition: Anticipate transitioning into an intensive residential treatment program after achieving adequate improvement in mood and remission of suicidal thoughts.

## 2020-03-23 NOTE — PLAN OF CARE
Problem: Cognitive Impairment (ECT)  Goal: Baseline Cognitive Function  Note: Pt states that her depression and SI thoughts are much improved because of the ECT. States that she is a bit forgetful but overall is getting better. Endorsed chronic SI with no plans. States HA, PRN Tylenol given. Pt was med compliant. Ate all of her meals for breakfast and lunch. Staff will continue to monitor for safety.

## 2020-03-23 NOTE — ANESTHESIA POSTPROCEDURE EVALUATION
Patient: Misty Parham    * No procedures listed *    Diagnosis:* No pre-op diagnosis entered *  Diagnosis Additional Information: No value filed.    Anesthesia Type:  General    Note:  Anesthesia Post Evaluation    Patient location during evaluation: PACU  Patient participation: Able to fully participate in evaluation  Level of consciousness: awake and alert  Pain management: adequate  Airway patency: patent  Cardiovascular status: acceptable  Respiratory status: acceptable and unassisted  Hydration status: acceptable  PONV: none             Last vitals:  Vitals:    03/23/20 0556 03/23/20 0645 03/23/20 0650   BP: 106/83 115/64 113/65   Pulse:  87 82   Resp:  27 17   Temp:      SpO2: 97% 93% 93%         Electronically Signed By: Luiza Lima MD  March 23, 2020  7:04 AM

## 2020-03-23 NOTE — PROCEDURES
[facility] ECT Procedure Note     Misty Parham [id number]   34 year old 1985     Patient Status: Inpatient    No Known Allergies    Weight:  222 lbs 6.4 oz    Patient Preparations: Glasses/Contacts removed         Diagnosis:   Major depression     Medications ineffective, Deteriorating fluid/electrolyte/nutritional status, History of good ECT response in one or more previous episodes of illness and currently receiving maintenance  ECT per lucas truong order 2X per week for duration of commitment. She reports improvement in her mood since starting lithium       Pause for the Cause:     Right patient Yes   Right procedure/laterality settings: Yes   Right diagnosis Yes          Intra-Procedure Documentation:     Date:  3/23/2020  Time:  6:32AM    ECT #    Treatment number this series: 8   Total treatment number: 8   Type of ECT:  Bilateral, standard    ECT Medications administered: Brevital: 100mg  Succinyl Choline: 100mg         Clinical Narrative:     ECT was administered by Thymatron machine.  Pt has been medically cleared for procedure, consent not needed as ECT tx is being done under court order. Side effects, Risks and benefits reviewed. Misty is IP, receiving ECT under price truong order, 2 ECT tx per week for duration of committment M/W schedule until price-truong is amended for more frequent treatment  ECT Strip Summary:   Energy Level: 85 percent  Motor Seizure Duration:22  seconds  EEG Seizure Duration:  37 seconds    Complications: No    Plan: 8th ECT 3/25/2020 per price truong order for duration of committment. Lithium initiated for augmentation    Doug Devi MD

## 2020-03-23 NOTE — PLAN OF CARE
Pt transitioned well to group session, which addressed illness management through therapeutic activity.  Given visual guide, pt ID'd her main barriers to recovery (e.g. low self-esteem, not asking for help). Pt ID'd his biggest barrier to recovery as low self-esteem which prevents her from communicating effectively. With MIN A, pt ID'd potential solutions to addressing barrier (e.g.  Setting small goals for herself, just trying speaking up). Pt will continue to benefit from OT intervention to address implementation of positive functional coping skills, role performance, and community reintegration.

## 2020-03-24 PROCEDURE — 12400000 ZZH R&B MH

## 2020-03-24 PROCEDURE — 25000132 ZZH RX MED GY IP 250 OP 250 PS 637: Performed by: PSYCHIATRY & NEUROLOGY

## 2020-03-24 RX ADMIN — VENLAFAXINE HYDROCHLORIDE 150 MG: 150 CAPSULE, EXTENDED RELEASE ORAL at 08:29

## 2020-03-24 RX ADMIN — LITHIUM CARBONATE 450 MG: 450 TABLET, EXTENDED RELEASE ORAL at 08:29

## 2020-03-24 RX ADMIN — RAMELTEON 8 MG: 8 TABLET ORAL at 20:04

## 2020-03-24 RX ADMIN — LAMOTRIGINE 200 MG: 100 TABLET ORAL at 08:29

## 2020-03-24 RX ADMIN — LITHIUM CARBONATE 450 MG: 450 TABLET, EXTENDED RELEASE ORAL at 19:04

## 2020-03-24 RX ADMIN — BREXPIPRAZOLE 3 MG: 1 TABLET ORAL at 08:29

## 2020-03-24 RX ADMIN — MIRTAZAPINE 45 MG: 45 TABLET, FILM COATED ORAL at 20:04

## 2020-03-24 ASSESSMENT — MIFFLIN-ST. JEOR: SCORE: 1700.59

## 2020-03-24 NOTE — PLAN OF CARE
"Pt is more talkative today. She reports she has been thinking about legally adopting her step daughter so \"she would have more opportunities\". Also states she is not eating much because it \"will do harm.\" States she is still having chronic SI but does not have any plans. Denies hallucinations or pain. Flat affect. Is calm and cooperative. Med compliant. Has been more visible in lounge today. Will continue to monitor.   "

## 2020-03-24 NOTE — PLAN OF CARE
"Pt states she is feeling better than when I saw her a week ago.  Pt states she thinks the lithium is helping to \"quiet\" her suicide thoughts.  Pt reported a right sided headache which is different than she had with previous ECTs.  Pt spoke to her  and sons today.  Pt looking forward to inpatient treatment, feeling that this was missing from her previous discharges and believes it will make a big difference in her future success.  Pt attended Activity Group and did an art project.  "

## 2020-03-24 NOTE — PROGRESS NOTES
"[facility]  Psychiatric Progress Note    Length of stay (days): 32        Interim History:   The patient's care was discussed with the treatment team during the daily team meeting and/or staff's chart notes were reviewed.  Staff report: No acute issues overnight.      Patient reports that she is feeling pretty good today with no she states she has not been eating.  She states she is not eating because she wants to be able to feel some abdominal discomfort.  She states she has one that she would never cut on herself again as she used to do this as a teenager and claims this is a way of controlling her pain and feeling some degree of emotion.  She asks when she will be able to transition to an intensive residential treatment facility at hospital requested to know when she will complete her series of electroconvulsive therapy sessions.  She does not endorse current self-harm thoughts or plans.  So far she is tolerating lithium and has not shown any evidence of side effects.    Depression severity scale 0-10 (10=most severe):  Today: 5    She reports ability to maintain safety in the hospital setting however is not able to ensure her own safety if discharged home today.    No homicidal thoughts reported.  No psychotic symptoms reported.    Energy is better, appetite is better, concentration is fair, anhedonia is moderate.    Tolerating medications without side effects.           Medications:       brexpiprazole  3 mg Oral Daily     lamoTRIgine  200 mg Oral Daily     lidocaine   Transdermal Q8H     lithium ER  450 mg Oral Q12H JASMINE     mirtazapine  45 mg Oral At Bedtime     ramelteon  8 mg Oral At Bedtime     venlafaxine  150 mg Oral Daily with breakfast          Allergies:   No Known Allergies       Labs:     No results found for this or any previous visit (from the past 24 hour(s)).       Psychiatric Examination:     /74   Pulse 80   Temp 98.6  F (37  C) (Oral)   Resp 16   Ht 1.651 m (5' 5\")  "  Wt 101 kg (222 lb 9.6 oz)   SpO2 99%   BMI 37.04 kg/m    Weight is 222 lbs 9.6 oz  Body mass index is 37.04 kg/m .  Orthostatic Vitals     None      Appearance: awake, alert, adequately groomed, dressed in hospital scrubs and appeared older than stated age  Attitude:  cooperative  Eye Contact:  fair  Mood:  better  Affect:  mood congruent, intensity is blunted and restricted range  Speech:  clear, coherent and decreased prosody  Psychomotor Behavior:  no evidence of tardive dyskinesia, dystonia, or tics  Throught Process:  linear  Associations:  no loose associations  Thought Content:  no evidence of suicidal ideation or homicidal ideation and no evidence of psychotic thought  Insight:  fair  Judgement:  fair  Oriented to:  time, person, and place  Attention Span and Concentration:  fair  Recent and Remote Memory:  intact          Precautions:     Behavioral Orders   Procedures     Code 1 - Restrict to Unit     Code 2 - 1:1 Staff Supervision     For ECT only     Code 2 - 1:1 Staff Supervision     For ECT only     Electroconvulsive therapy     Series of up to 12 treatments. Begin Date: 2/26/20   Treating Psychiatrist providing ECT: Doug Devi MD   Notified on: 2/25/20     Electroconvulsive therapy     Series of up to 12 treatments. Begin Date: 2/26/20   Treating Psychiatrist providing ECT: Doug Devi MD   Notified on: 2/25/20     Electroconvulsive therapy     Series of up to 12 treatments. Begin Date: 2/26/20   Treating Psychiatrist providing ECT: Doug Devi MD   Notified on: 2/25/20     Electroconvulsive therapy     Series of up to 12 treatments. Begin Date: 2/26/20   Treating Psychiatrist providing ECT: Doug Devi MD   Notified on: 2/25/20     Electroconvulsive therapy     Series of up to 12 treatments. Begin Date: 2/26/20   Treating Psychiatrist providing ECT: Doug Devi MD   Notified on: 2/25/20     Electroconvulsive therapy     Series of  up to 12 treatments. Begin Date: 2/26/20   Treating Psychiatrist providing ECT: Doug Devi MD   Notified on: 2/25/20     Fall precautions     Fall precautions     Routine Programming     As clinically indicated     Status 15     Every 15 minutes.          DIagnoses:     Major depressive disorder-recurrent, severe  Generalized anxiety disorder  Unspecified personality disorder       Plan:     Continue her current medications.  Check lithium level tomorrow morning    Treatment with ECT has been resumed while under a [facility].  Treatment notes reviewed and treatment continues to proceed without complications.  The patient is currently allowed treatment twice a week under her current [facility] order which will be scheduled on Mondays and Fridays.  Documentation has been submitted to the court requesting permission to utilize ECT 3 times a week.    Psychosocial treatments to be addressed with social work consult and groups.    -Once she is agreeable, neuropsychological testing may be helpful to identify any underlying personality characteristics that may be complicating her treatment course.  -Patient is applying for medical assistance.    Legal Status: full commitment with [facility]    Disposition: Anticipate transitioning into an intensive residential treatment program after achieving adequate improvement in mood and remission of suicidal thoughts.

## 2020-03-25 ENCOUNTER — APPOINTMENT (OUTPATIENT)
Dept: SURGERY | Facility: CLINIC | Age: 35
End: 2020-03-25
Payer: COMMERCIAL

## 2020-03-25 ENCOUNTER — ANESTHESIA (OUTPATIENT)
Dept: SURGERY | Facility: CLINIC | Age: 35
End: 2020-03-25

## 2020-03-25 ENCOUNTER — ANESTHESIA EVENT (OUTPATIENT)
Dept: SURGERY | Facility: CLINIC | Age: 35
End: 2020-03-25

## 2020-03-25 LAB — LITHIUM SERPL-SCNC: 0.95 MMOL/L (ref 0.6–1.2)

## 2020-03-25 PROCEDURE — 25000132 ZZH RX MED GY IP 250 OP 250 PS 637: Performed by: PSYCHIATRY & NEUROLOGY

## 2020-03-25 PROCEDURE — 25000128 H RX IP 250 OP 636: Performed by: ANESTHESIOLOGY

## 2020-03-25 PROCEDURE — GZB2ZZZ ELECTROCONVULSIVE THERAPY, BILATERAL-SINGLE SEIZURE: ICD-10-PCS | Performed by: PSYCHIATRY & NEUROLOGY

## 2020-03-25 PROCEDURE — 40000671 ZZH STATISTIC ANESTHESIA CASE

## 2020-03-25 PROCEDURE — 36415 COLL VENOUS BLD VENIPUNCTURE: CPT | Performed by: PSYCHIATRY & NEUROLOGY

## 2020-03-25 PROCEDURE — 80178 ASSAY OF LITHIUM: CPT | Performed by: PSYCHIATRY & NEUROLOGY

## 2020-03-25 PROCEDURE — 25800030 ZZH RX IP 258 OP 636: Performed by: ANESTHESIOLOGY

## 2020-03-25 PROCEDURE — 12400000 ZZH R&B MH

## 2020-03-25 PROCEDURE — 25000128 H RX IP 250 OP 636: Performed by: NURSE ANESTHETIST, CERTIFIED REGISTERED

## 2020-03-25 PROCEDURE — 90870 ELECTROCONVULSIVE THERAPY: CPT

## 2020-03-25 PROCEDURE — 25000125 ZZHC RX 250: Performed by: NURSE ANESTHETIST, CERTIFIED REGISTERED

## 2020-03-25 RX ORDER — KETOROLAC TROMETHAMINE 30 MG/ML
30 INJECTION, SOLUTION INTRAMUSCULAR; INTRAVENOUS ONCE
Status: COMPLETED | OUTPATIENT
Start: 2020-03-25 | End: 2020-03-25

## 2020-03-25 RX ORDER — SODIUM CHLORIDE, SODIUM LACTATE, POTASSIUM CHLORIDE, CALCIUM CHLORIDE 600; 310; 30; 20 MG/100ML; MG/100ML; MG/100ML; MG/100ML
INJECTION, SOLUTION INTRAVENOUS CONTINUOUS
Status: DISCONTINUED | OUTPATIENT
Start: 2020-03-25 | End: 2020-03-30

## 2020-03-25 RX ADMIN — SODIUM CHLORIDE, POTASSIUM CHLORIDE, SODIUM LACTATE AND CALCIUM CHLORIDE 500 ML: 600; 310; 30; 20 INJECTION, SOLUTION INTRAVENOUS at 06:12

## 2020-03-25 RX ADMIN — METHOHEXITAL SODIUM 100 MG: 500 INJECTION, POWDER, LYOPHILIZED, FOR SOLUTION INTRAMUSCULAR; INTRAVENOUS; RECTAL at 06:41

## 2020-03-25 RX ADMIN — KETOROLAC TROMETHAMINE 30 MG: 30 INJECTION, SOLUTION INTRAMUSCULAR at 06:30

## 2020-03-25 RX ADMIN — LITHIUM CARBONATE 450 MG: 450 TABLET, EXTENDED RELEASE ORAL at 20:54

## 2020-03-25 RX ADMIN — SUCCINYLCHOLINE CHLORIDE 100 MG: 20 INJECTION, SOLUTION INTRAMUSCULAR; INTRAVENOUS; PARENTERAL at 06:41

## 2020-03-25 RX ADMIN — RAMELTEON 8 MG: 8 TABLET ORAL at 20:54

## 2020-03-25 RX ADMIN — MIRTAZAPINE 45 MG: 45 TABLET, FILM COATED ORAL at 20:54

## 2020-03-25 RX ADMIN — LITHIUM CARBONATE 450 MG: 450 TABLET, EXTENDED RELEASE ORAL at 07:33

## 2020-03-25 RX ADMIN — ACETAMINOPHEN 650 MG: 325 TABLET, FILM COATED ORAL at 20:54

## 2020-03-25 RX ADMIN — LAMOTRIGINE 200 MG: 100 TABLET ORAL at 07:33

## 2020-03-25 RX ADMIN — BREXPIPRAZOLE 3 MG: 1 TABLET ORAL at 07:33

## 2020-03-25 RX ADMIN — VENLAFAXINE HYDROCHLORIDE 150 MG: 150 CAPSULE, EXTENDED RELEASE ORAL at 07:34

## 2020-03-25 ASSESSMENT — ACTIVITIES OF DAILY LIVING (ADL)
ORAL_HYGIENE: INDEPENDENT
DRESS: INDEPENDENT
DRESS: SCRUBS (BEHAVIORAL HEALTH)
HYGIENE/GROOMING: INDEPENDENT
HYGIENE/GROOMING: INDEPENDENT
LAUNDRY: WITH SUPERVISION
ORAL_HYGIENE: INDEPENDENT

## 2020-03-25 ASSESSMENT — LIFESTYLE VARIABLES: TOBACCO_USE: 0

## 2020-03-25 NOTE — PLAN OF CARE
Pt transitioned to OT group with MIN encouragement and with MIN VCs participated in therapeutic group activity and discussion addressing holistic wellbeing. Educated pt on six dimensions of wellbeing (community, environment, health, relationships, security, and purpose) with pt identifying her wellbeing in each category. Pt ID'd wellbeing strengths (e.g. good relationship with her , her children giving her a sense of purpose) and areas for improvement (e.g. needing to get out of the house more, finding a sense of community) and set one specific goal to work towards same (start using positive affirmations to become more positive). Pt will continue to benefit from OT intervention to address implementation of positive functional coping skills, role performance, and community reintegration.

## 2020-03-25 NOTE — ANESTHESIA CARE TRANSFER NOTE
Patient: Misty Parham    * No procedures listed *    Diagnosis: * No pre-op diagnosis entered *  Diagnosis Additional Information: No value filed.    Anesthesia Type:   General     Note:  Airway :Nasal Cannula  Patient transferred to:PACU  Comments: Native airway general anesthetic.  Patient hyperventilated with 100% oxygen via mask prior to treatment.   Anesthesia induced using patent peripheral IV.    Bite block placed between molars to protect teeth and tongue.     After induction of seizure patient mask ventilated with 100% oxygen until spontaneous respirations returned.     At time of handoff to PACU, patient exhibited spontaneous respirations, adequate tidal volumes, airway patent. Oxygen via nasal cannula at 4 liters per minute to PACU in cart with siderails up, connected to wall O2 in PACU. All monitors and alarms on and functioning. Report given to PACU RN and questions answered.   Handoff Report: Identifed the Patient, Identified the Reponsible Provider, Reviewed the pertinent medical history, Discussed the surgical course, Reviewed Intra-OP anesthesia mangement and issues during anesthesia, Set expectations for post-procedure period and Allowed opportunity for questions and acknowledgement of understanding      Vitals: (Last set prior to Anesthesia Care Transfer)    CRNA VITALS  3/25/2020 0620 - 3/25/2020 0657      3/25/2020             Pulse:  109    SpO2:  94 %    Resp Rate (observed):  23    Resp Rate (set):  10    EKG:  Sinus rhythm                Electronically Signed By: WILLIAN Ramos CRNA  March 25, 2020  6:57 AM

## 2020-03-25 NOTE — PROCEDURES
[facility] ECT Procedure Note     Misty Parham [id number]   34 year old 1985     Patient Status: Inpatient    No Known Allergies    Weight:  222 lbs 9.6 oz    Patient Preparations: Glasses/Contacts removed         Diagnosis:   Major depression     Medications ineffective, Deteriorating fluid/electrolyte/nutritional status, History of good ECT response in one or more previous episodes of illness and currently receiving maintenance  ECT per lucas truong order 2X per week for duration of commitment. She reports improvement in her mood since starting lithium       Pause for the Cause:     Right patient Yes   Right procedure/laterality settings: Yes   Right diagnosis Yes          Intra-Procedure Documentation:     Date:  3/25/2020  Time:  6:41AM    ECT #    Treatment number this series: 9   Total treatment number: 9   Type of ECT:  Bilateral, standard    ECT Medications administered: Brevital: 100mg  Succinyl Choline: 100mg         Clinical Narrative:     ECT was administered by Thymatron machine.  Pt has been medically cleared for procedure, consent not needed as ECT tx is being done under court order. Side effects, Risks and benefits reviewed. Misty is IP, receiving ECT under price truong order, 2 ECT tx per week for duration of committment M/W schedule until price-truong is amended for more frequent treatment-we are still waiting on this  ECT Strip Summary:   Energy Level: 90 percent  Motor Seizure Duration:31  seconds  EEG Seizure Duration: 31 seconds    Complications: small cut L side of tongue despite bite block placement by CRNA, will make sure this is observed closer next tx.    Plan: 9th ECT 3/30/2020 per price truong order for duration of committment. Lithium initiated for augmentation    Doug Devi MD

## 2020-03-25 NOTE — ANESTHESIA POSTPROCEDURE EVALUATION
Patient: Misty Parham    * No procedures listed *    Diagnosis:* No pre-op diagnosis entered *  Diagnosis Additional Information: No value filed.    Anesthesia Type:  General    Note:  Anesthesia Post Evaluation    Patient location during evaluation: Bedside  Patient participation: Able to fully participate in evaluation  Level of consciousness: awake and alert  Pain management: adequate  Airway patency: patent  Cardiovascular status: acceptable  Respiratory status: acceptable  Hydration status: acceptable  PONV: none             Last vitals:  Vitals:    03/25/20 0710 03/25/20 0715 03/25/20 0720   BP: 101/71 96/83 107/80   Pulse:      Resp: 14 14 16   Temp: 36.6  C (97.9  F)     SpO2: 92%  95%         Electronically Signed By: Pedro Finley MD  March 25, 2020  7:47 AM

## 2020-03-25 NOTE — ANESTHESIA POSTPROCEDURE EVALUATION
Patient: Misty Parham    * No procedures listed *    Diagnosis:* No pre-op diagnosis entered *  Diagnosis Additional Information: No value filed.    Anesthesia Type:  General    Note:  Anesthesia Post Evaluation    Patient location during evaluation: PACU  Patient participation: Able to fully participate in evaluation  Level of consciousness: awake and alert  Pain management: adequate  Airway patency: patent  Cardiovascular status: acceptable  Respiratory status: acceptable and unassisted  Hydration status: acceptable  PONV: none             Last vitals:  Vitals:    03/25/20 0539 03/25/20 0551 03/25/20 0654   BP: 115/81 109/69 118/88   Pulse: 75 80    Resp: 16 16 16   Temp: 36.6  C (97.9  F) 36.3  C (97.3  F) 36.8  C (98.2  F)   SpO2: 98% 95% 93%         Electronically Signed By: Luiza Lima MD  March 25, 2020  6:57 AM

## 2020-03-25 NOTE — ANESTHESIA PREPROCEDURE EVALUATION
Anesthesia Pre-Procedure Evaluation    Patient: Misty Parham   MRN: [id number] : 1985          Preoperative Diagnosis: * No pre-op diagnosis entered *    * No procedures listed *    Past Medical History:   Diagnosis Date     Depressive disorder      Past Surgical History:   Procedure Laterality Date     CHOLECYSTECTOMY         Anesthesia Evaluation     . Pt has had prior anesthetic. Type: General    No history of anesthetic complications          ROS/MED HX    ENT/Pulmonary:      (-) tobacco use and sleep apnea   Neurologic:      (-) Delerium   Cardiovascular:  - neg cardiovascular ROS   (+) ----. : . . . :. . Previous cardiac testing date:results:date: results:ECG reviewed date:2020 results:NSR date: results:         (-) hypertension   METS/Exercise Tolerance:  >4 METS   Hematologic:         Musculoskeletal:         GI/Hepatic:        (-) GERD   Renal/Genitourinary:  - ROS Renal section negative       Endo: Comment: BMI 37    (+) Obesity (BMI 37), .      Psychiatric:     (+) psychiatric history (severe, requring ECT) depression      Infectious Disease:         Malignancy:         Other:    (+) No chance of pregnancy                           Physical Exam  Normal systems: dental    Airway   Mallampati: III  TM distance: >3 FB  Neck ROM: full    Dental     Cardiovascular   Rhythm and rate: regular      Pulmonary    breath sounds clear to auscultation            Lab Results   Component Value Date    WBC 9.2 2020    HGB 12.7 2020    HCT 39.1 2020     2020     2020    POTASSIUM 3.4 2020    CHLORIDE 104 2020    CO2 26 2020    BUN 13 2020    CR 0.87 2020    GLC 92 2020    CRISTINE 9.1 2020    ALBUMIN 3.8 2020    PROTTOTAL 7.6 2020    ALT 15 2020    AST 13 2020    ALKPHOS 78 2020    BILITOTAL 0.4 2020    TSH 1.08 2020    HCG Negative 2020    HCGS Negative 2020       Preop  "Vitals  BP Readings from Last 3 Encounters:   03/25/20 109/69   02/21/20 120/88   02/18/20 114/83    Pulse Readings from Last 3 Encounters:   03/25/20 80   02/21/20 109   02/18/20 95      Resp Readings from Last 3 Encounters:   03/25/20 16   02/21/20 16   02/18/20 16    SpO2 Readings from Last 3 Encounters:   03/25/20 95%   02/21/20 97%   02/18/20 99%      Temp Readings from Last 1 Encounters:   03/25/20 36.3  C (97.3  F) (Temporal)    Ht Readings from Last 1 Encounters:   02/21/20 1.651 m (5' 5\")      Wt Readings from Last 1 Encounters:   03/24/20 101 kg (222 lb 9.6 oz)    Estimated body mass index is 37.04 kg/m  as calculated from the following:    Height as of 2/21/20: 1.651 m (5' 5\").    Weight as of 3/24/20: 101 kg (222 lb 9.6 oz).       Anesthesia Plan      History & Physical Review  History and physical reviewed and following examination; no interval change.    ASA Status:  2 .    NPO Status:  > 8 hours    Plan for General (Mask ventilation) with Intravenous induction.   PONV prophylaxis:  Ondansetron (or other 5HT-3)         Postoperative Care  Postoperative pain management:  Multi-modal analgesia.      Consents  Anesthetic plan, risks, benefits and alternatives discussed with:  Patient..                   Luiza Lima MD  "

## 2020-03-25 NOTE — PROGRESS NOTES
I called and spoke with Samreen Machuca today, pt's Manjeet Irvin CM.  Update given, on both the MA status and how pt is doing.  We also talked about making the IRT's referrals now instead of waiting for the MA to go through.  So today I faxed referrals to:  -[facility]  -[facility].    Pt was in agreement and signed GEORGI's.

## 2020-03-25 NOTE — PROGRESS NOTES
I contacted Herbert in our business office to inquire about a status update regarding pt's MA status.   Here is an excerpt from an email he sent back to me:    Pt's MA is pending and in process.   I checked today and patients MA is not active.  It looks like we will be looking at possibly 4 more weeks before we may have an answer.      Pt's  BCBS insurance is still active and will be the primary ins coverage and responsible to pay first over an MA coverage until the BCBS ends.   I'm not sure helpful to say that if MA were to be currently active today, the BCBS would be addressed as primary and MA secondary. I'm not sure of the logistics and process of a  scenario where a pt is needing services that are Not covered by BCBS but would be covered by MA. It gets sticky and I do not have enough experience as to how that is approached and managed.

## 2020-03-25 NOTE — PLAN OF CARE
Pt had ECT today and states that she thinks ECT is helping her improve. Her mood appears to be good. Pt ate most of her breakfast and lunch. Pt was visible in the milieu. Pt took a shower today.  Pt denied AVH, pain, but continues to endorse chronic SI with no plans. Pt watched TV, attended groups, social with staff. Staff will continue to monitor for safety.

## 2020-03-25 NOTE — PLAN OF CARE
Problem: Suicidal Behavior  Goal: Suicidal Behavior is Absent or Managed  3/24/2020 2048 by Jacinta Friedman RN  Flowsheets (Taken 3/24/2020 2025)  Mutually Determined Action Steps (Facilitate Resolution of Suicidal Intent):   identifies protective factors   sets future-oriented goal   identifies crisis plan  Mutually Determined Action Steps (Provide Immediate/Ongoing Protective Physical Environment):   verbalizes safety check rationale   identifies home safety strategy   shares suicidal thoughts  Note: Pt was visible the milieu. Appeared to be in a good mood. Pt shared that she is hoping to, in the future, adopt her step daughter so that she can eventually gain citizenship. Pt states that she thinks that ECT is helping improve her mood but leaving her with loss of memory. Pt requested for snacks before supper and ate 100% of supper. Pt denied AVH, pain, but continues to endorse chronic SI with no plans. Pt watched TV, attended groups, social with staff. Staff will continue to monitor for safety.

## 2020-03-26 PROCEDURE — 25000132 ZZH RX MED GY IP 250 OP 250 PS 637: Performed by: PSYCHIATRY & NEUROLOGY

## 2020-03-26 PROCEDURE — 12400000 ZZH R&B MH

## 2020-03-26 RX ADMIN — BREXPIPRAZOLE 3 MG: 1 TABLET ORAL at 08:51

## 2020-03-26 RX ADMIN — MIRTAZAPINE 45 MG: 45 TABLET, FILM COATED ORAL at 22:08

## 2020-03-26 RX ADMIN — LITHIUM CARBONATE 450 MG: 450 TABLET, EXTENDED RELEASE ORAL at 08:51

## 2020-03-26 RX ADMIN — LITHIUM CARBONATE 450 MG: 450 TABLET, EXTENDED RELEASE ORAL at 22:08

## 2020-03-26 RX ADMIN — VENLAFAXINE HYDROCHLORIDE 150 MG: 150 CAPSULE, EXTENDED RELEASE ORAL at 08:51

## 2020-03-26 RX ADMIN — LAMOTRIGINE 200 MG: 100 TABLET ORAL at 08:51

## 2020-03-26 RX ADMIN — RAMELTEON 8 MG: 8 TABLET ORAL at 22:08

## 2020-03-26 ASSESSMENT — MIFFLIN-ST. JEOR: SCORE: 1730.98

## 2020-03-26 ASSESSMENT — ACTIVITIES OF DAILY LIVING (ADL)
LAUNDRY: WITH SUPERVISION
DRESS: SCRUBS (BEHAVIORAL HEALTH)
HYGIENE/GROOMING: INDEPENDENT
DRESS: INDEPENDENT
HYGIENE/GROOMING: INDEPENDENT
ORAL_HYGIENE: INDEPENDENT
ORAL_HYGIENE: INDEPENDENT

## 2020-03-26 NOTE — PROGRESS NOTES
"[facility]  Psychiatric Progress Note    Length of stay (days): 34        Interim History:   The patient's care was discussed with the treatment team during the daily team meeting and/or staff's chart notes were reviewed.  Staff report: No acute issues overnight.      Patient reports that she is feeling pretty good today with no she states she has not been eating.  She states she is unsure if she is benefiting from ECT or the addition of lithium. She is very focused on what the next steps are for her.  The unit  reports that her medical assistance is not yet in place and claims she was advised that it may take up to 4 weeks before this is in place.  She needs this in place to facilitate placement in an intensive residential treatment services facility.  Patient reportedly had been apprised of this as well.  We discussed ways to keep her occupied on the unit and especially talked about little tasks that she can decide herself    Depression severity scale 0-10 (10=most severe):  Today: 5    She reports ability to maintain safety in the hospital setting however is not able to ensure her own safety if discharged home today.    No homicidal thoughts reported.  No psychotic symptoms reported.    Energy is better, appetite is better, concentration is fair, anhedonia is moderate.    Tolerating medications without side effects.           Medications:       brexpiprazole  3 mg Oral Daily     lamoTRIgine  200 mg Oral Daily     lidocaine   Transdermal Q8H     lidocaine   Transdermal Q8H     lithium ER  450 mg Oral Q12H JASMINE     mirtazapine  45 mg Oral At Bedtime     ramelteon  8 mg Oral At Bedtime     venlafaxine  150 mg Oral Daily with breakfast          Allergies:   No Known Allergies       Labs:     No results found for this or any previous visit (from the past 24 hour(s)).       Psychiatric Examination:     /76   Pulse 81   Temp 98.6  F (37  C) (Oral)   Resp 16   Ht 1.651 m (5' 5\")   " "Wt 101 kg (222 lb 9.6 oz)   SpO2 98%   BMI 37.04 kg/m    Weight is 222 lbs 9.6 oz  Body mass index is 37.04 kg/m .  Orthostatic Vitals     None      Appearance: awake, alert, adequately groomed, dressed in hospital scrubs and appeared older than stated age  Attitude:  cooperative  Eye Contact:  fair  Mood:  better  Affect:  \"okay\", friendly and cheerful  Speech:  clear, coherent and normal prosody  Psychomotor Behavior:  no evidence of tardive dyskinesia, dystonia, or tics  Throught Process:  linear  Associations:  no loose associations  Thought Content:  no evidence of suicidal ideation or homicidal ideation and no evidence of psychotic thought  Insight:  fair  Judgement:  fair  Oriented to:  time, person, and place  Attention Span and Concentration:  fair  Recent and Remote Memory:  intact          Precautions:     Behavioral Orders   Procedures     Code 1 - Restrict to Unit     Code 2 - 1:1 Staff Supervision     For ECT only     Code 2 - 1:1 Staff Supervision     For ECT only     Electroconvulsive therapy     Series of up to 12 treatments. Begin Date: 2/26/20   Treating Psychiatrist providing ECT: Doug Devi MD   Notified on: 2/25/20     Electroconvulsive therapy     Series of up to 12 treatments. Begin Date: 2/26/20   Treating Psychiatrist providing ECT: Doug Devi MD   Notified on: 2/25/20     Electroconvulsive therapy     Series of up to 12 treatments. Begin Date: 2/26/20   Treating Psychiatrist providing ECT: Doug Devi MD   Notified on: 2/25/20     Electroconvulsive therapy     Series of up to 12 treatments. Begin Date: 2/26/20   Treating Psychiatrist providing ECT: Doug Devi MD   Notified on: 2/25/20     Electroconvulsive therapy     Series of up to 12 treatments. Begin Date: 2/26/20   Treating Psychiatrist providing ECT: Doug Devi MD   Notified on: 2/25/20     Electroconvulsive therapy     Series of up to 12 treatments. Begin Date: " 2/26/20   Treating Psychiatrist providing ECT: Doug Devi MD   Notified on: 2/25/20     Electroconvulsive therapy     Series of up to 12 treatments. Begin Date: 2/26/20   Treating Psychiatrist providing ECT: Doug Devi MD   Notified on: 2/25/20     Fall precautions     Fall precautions     Routine Programming     As clinically indicated     Status 15     Every 15 minutes.          DIagnoses:     Major depressive disorder-recurrent, severe  Generalized anxiety disorder  Unspecified personality disorder       Plan:     Continue her current medications.      Treatment with ECT has been resumed while under a [facility].  Treatment notes reviewed and treatment continues to proceed without complications.  The patient is currently allowed treatment twice a week under her current [facility] order which will be scheduled on Mondays and Fridays.  Documentation has been submitted to the court requesting permission to utilize ECT 3 times a week.    Psychosocial treatments to be addressed with social work consult and groups.    -Once she is agreeable, neuropsychological testing may be helpful to identify any underlying personality characteristics that may be complicating her treatment course.  -Patient is applying for medical assistance.    Legal Status: full commitment with [facility]    Disposition: Anticipate transitioning into an intensive residential treatment program after achieving adequate improvement in mood and remission of suicidal thoughts.

## 2020-03-26 NOTE — PLAN OF CARE
With initial task set up and MOD encouragement, pt participated in therapeutic group activity and discussion addressing strategies to increase motivation for improved wellness. Educated pt on strategies to increase motivation and common myths regarding motivation. Pt ID'd strategies to help improve her motivation as taking things one step at a time. Pt reports that she could use these techniques to improve her attitude. Pt will continue to benefit from OT intervention to address implementation of positive functional coping skills, role performance, and community reintegration.

## 2020-03-26 NOTE — PLAN OF CARE
Problem: General Plan of Care (Inpatient Behavioral)  Goal: Individualization/Patient Specific Goal (IP Behavioral)  Description: The patient and/or their representative will achieve their patient-specific goals related to the plan of care.    The patient-specific goals include:    1. Absence of suicidal ideation with safety plan in place.  2. Effective medication regime with patient compliance.  3. Stabilization of mood and thought processes.  4. Improved insight into mental health issues.  5. Able to identify and utilize positive coping skills.  6. Plan in place for ongoing treatment and support.     Outcome: No Change  Note: Pt has been present on the unit but remains withdrawn. Pt has been smiling a bit more but still displays depressive symptoms and admits to feeling suicidal from time to time. Pt describes her thoughts as coming and going. Pt denies any plan just that she may do something. Pt has been encouraged to work on something's such as coloring and other tasks to help occupy her mind. Pt was also encouraged to write a letter to her kids to talk to them about how she is doing and express her feelings. Pt has been open to completing this. Pt is pleasant and cooperative. Nursing to continue to monitor.

## 2020-03-26 NOTE — PLAN OF CARE
Problem: Depressive Symptoms  Goal: Depressive Symptoms  Description: Signs and symptoms of listed problems will be absent or manageable.  Suicidal ideation,   Depression.   3/25/2020 2137 by Alicia Fu RN  Outcome: Improving  Flowsheets (Taken 3/25/2020 2137)  Depressive Symptoms Assessed: all  Depressive Symptoms Present:   thought process   affect  Note: Patient visible this shift, pleasant and cooperative. Mood appears brighter, able to laugh a little during conversation. Attended both groups. During wrap-up group patient was able to read the question she was given but was not able to respond. Staff waited for a few minutes then moved along to the next patient. However, during staff check in patient was able to communicate clearly & without hesitation. Patient took PRN Tylenol this shift for a headache. This shift patient ate carrots with ranch, peanut butter with crackers and a milk. Patient endorses fleeting thoughts of suicide but contracts for safety.

## 2020-03-26 NOTE — PROGRESS NOTES
"[facility]  Psychiatric Progress Note    Length of stay (days): 33        Interim History:   The patient's care was discussed with the treatment team during the daily team meeting and/or staff's chart notes were reviewed.  Staff report: No acute issues overnight.      Patient had ECT earlier today and reports that she feels out of sorts as she has a headache.  She does not endorse any untoward effects from her medications and denies experiencing any acute self-harm thoughts.  She states she is trying to take 1 day at a time and does not endorse any fresh psychosocial crisis.  Her lithium level drawn today came back at 0.95 mmol/L.    Depression severity scale 0-10 (10=most severe):  Today: 6    She reports ability to maintain safety in the hospital setting however is not able to ensure her own safety if discharged home today.    No homicidal thoughts reported.  No psychotic symptoms reported.    Energy is better, appetite is better, concentration is fair, anhedonia is moderate.    Tolerating medications without side effects.           Medications:       brexpiprazole  3 mg Oral Daily     lamoTRIgine  200 mg Oral Daily     lidocaine   Transdermal Q8H     lidocaine   Transdermal Q8H     lithium ER  450 mg Oral Q12H JASMINE     mirtazapine  45 mg Oral At Bedtime     ramelteon  8 mg Oral At Bedtime     venlafaxine  150 mg Oral Daily with breakfast          Allergies:   No Known Allergies       Labs:     No results found for this or any previous visit (from the past 24 hour(s)).       Psychiatric Examination:     /76   Pulse 81   Temp 98.6  F (37  C) (Oral)   Resp 16   Ht 1.651 m (5' 5\")   Wt 101 kg (222 lb 9.6 oz)   SpO2 98%   BMI 37.04 kg/m    Weight is 222 lbs 9.6 oz  Body mass index is 37.04 kg/m .  Orthostatic Vitals     None      Appearance: awake, alert, adequately groomed, dressed in hospital scrubs and appeared older than stated age  Attitude:  cooperative  Eye Contact:  fair  Mood:  " sad   Affect:  mood congruent, intensity is blunted and restricted range  Speech:  clear, coherent and normal prosody  Psychomotor Behavior:  no evidence of tardive dyskinesia, dystonia, or tics  Throught Process:  logical, linear and goal oriented  Associations:  no loose associations  Thought Content:  no evidence of suicidal ideation or homicidal ideation and no evidence of psychotic thought  Insight:  fair  Judgement:  fair  Oriented to:  time, person, and place  Attention Span and Concentration:  fair  Recent and Remote Memory:  intact          Precautions:     Behavioral Orders   Procedures     Code 1 - Restrict to Unit     Code 2 - 1:1 Staff Supervision     For ECT only     Code 2 - 1:1 Staff Supervision     For ECT only     Electroconvulsive therapy     Series of up to 12 treatments. Begin Date: 2/26/20   Treating Psychiatrist providing ECT: Doug Devi MD   Notified on: 2/25/20     Electroconvulsive therapy     Series of up to 12 treatments. Begin Date: 2/26/20   Treating Psychiatrist providing ECT: Doug Devi MD   Notified on: 2/25/20     Electroconvulsive therapy     Series of up to 12 treatments. Begin Date: 2/26/20   Treating Psychiatrist providing ECT: Doug Devi MD   Notified on: 2/25/20     Electroconvulsive therapy     Series of up to 12 treatments. Begin Date: 2/26/20   Treating Psychiatrist providing ECT: Doug Devi MD   Notified on: 2/25/20     Electroconvulsive therapy     Series of up to 12 treatments. Begin Date: 2/26/20   Treating Psychiatrist providing ECT: Doug Devi MD   Notified on: 2/25/20     Electroconvulsive therapy     Series of up to 12 treatments. Begin Date: 2/26/20   Treating Psychiatrist providing ECT: Doug Devi MD   Notified on: 2/25/20     Electroconvulsive therapy     Series of up to 12 treatments. Begin Date: 2/26/20   Treating Psychiatrist providing ECT: Doug Devi MD    Notified on: 2/25/20     Fall precautions     Fall precautions     Routine Programming     As clinically indicated     Status 15     Every 15 minutes.          DIagnoses:     Major depressive disorder-recurrent, severe  Generalized anxiety disorder  Unspecified personality disorder       Plan:     Continue her current medications.     Treatment with ECT has been resumed while under a [facility].  Treatment notes reviewed and treatment continues to proceed without complications.  The patient is currently allowed treatment twice a week under her current [facility] order which will be scheduled on Mondays and Fridays.  Documentation has been submitted to the court requesting permission to utilize ECT 3 times a week.    Psychosocial treatments to be addressed with social work consult and groups.    -Once she is agreeable, neuropsychological testing may be helpful to identify any underlying personality characteristics that may be complicating her treatment course.  -Patient is applying for medical assistance.    Legal Status: full commitment with [facility]    Disposition: Anticipate transitioning into an intensive residential treatment program after achieving adequate improvement in mood and remission of suicidal thoughts.

## 2020-03-27 PROCEDURE — 25000132 ZZH RX MED GY IP 250 OP 250 PS 637: Performed by: PSYCHIATRY & NEUROLOGY

## 2020-03-27 PROCEDURE — 12400000 ZZH R&B MH

## 2020-03-27 RX ADMIN — LITHIUM CARBONATE 450 MG: 450 TABLET, EXTENDED RELEASE ORAL at 09:02

## 2020-03-27 RX ADMIN — RAMELTEON 8 MG: 8 TABLET ORAL at 20:40

## 2020-03-27 RX ADMIN — BREXPIPRAZOLE 3 MG: 1 TABLET ORAL at 09:03

## 2020-03-27 RX ADMIN — VENLAFAXINE HYDROCHLORIDE 150 MG: 150 CAPSULE, EXTENDED RELEASE ORAL at 09:03

## 2020-03-27 RX ADMIN — LITHIUM CARBONATE 450 MG: 450 TABLET, EXTENDED RELEASE ORAL at 20:40

## 2020-03-27 RX ADMIN — LAMOTRIGINE 200 MG: 100 TABLET ORAL at 09:03

## 2020-03-27 RX ADMIN — MIRTAZAPINE 45 MG: 45 TABLET, FILM COATED ORAL at 20:40

## 2020-03-27 NOTE — PROGRESS NOTES
[facility]  Psychiatric Progress Note    Length of stay (days): 35        Interim History:   The patient's care was discussed with the treatment team during the daily team meeting and/or staff's chart notes were reviewed.  Staff report: No acute issues overnight.      Patient reports that she was supposed to have a court hearing today.  She reports absence of self-harm thoughts plans or intent.  She denies experiencing any untoward effects from her currently prescribed medications.  She states she is still not eating but denies experiencing any somatic symptoms.  She was advised that staff will obtain labs tomorrow to evaluate her electrolytes.  However, her nurse suggest that she is eating more than she is reporting albeit not regular entrées but fruit and crackers.  She clarifies again that she is not suicidal but is using her food restriction to produce abdominal pain which she claims soothes her.  She reportedly followed recommendations and did complete assignments yesterday where she made calls to her family and wrote letters as well    Depression severity scale 0-10 (10=most severe):  Today: 4    She reports ability to maintain safety in the hospital setting however is not able to ensure her own safety if discharged home today.    No homicidal thoughts reported.  No psychotic symptoms reported.    Energy is better, appetite is better, concentration is fair, anhedonia is moderate.    Tolerating medications without side effects.           Medications:       brexpiprazole  3 mg Oral Daily     lamoTRIgine  200 mg Oral Daily     lidocaine   Transdermal Q8H     lidocaine   Transdermal Q8H     lithium ER  450 mg Oral Q12H JASMINE     mirtazapine  45 mg Oral At Bedtime     ramelteon  8 mg Oral At Bedtime     venlafaxine  150 mg Oral Daily with breakfast          Allergies:   No Known Allergies       Labs:     No results found for this or any previous visit (from the past 24 hour(s)).       Psychiatric  "Examination:     /68   Pulse 71   Temp 98.5  F (36.9  C) (Oral)   Resp 16   Ht 1.651 m (5' 5\")   Wt 104 kg (229 lb 4.8 oz)   SpO2 98%   BMI 38.16 kg/m    Weight is 229 lbs 4.8 oz  Body mass index is 38.16 kg/m .  Orthostatic Vitals     None      Appearance: awake, alert, adequately groomed, dressed in hospital scrubs and appeared older than stated age  Attitude:  cooperative  Eye Contact:  fair  Mood:  better  Affect:  \"okay\", friendly and cheerful  Speech:  clear, coherent and normal prosody  Psychomotor Behavior:  no evidence of tardive dyskinesia, dystonia, or tics  Throught Process:  linear  Associations:  no loose associations  Thought Content:  no evidence of suicidal ideation or homicidal ideation and no evidence of psychotic thought  Insight:  fair  Judgement:  fair  Oriented to:  time, person, and place  Attention Span and Concentration:  fair  Recent and Remote Memory:  intact          Precautions:     Behavioral Orders   Procedures     Code 1 - Restrict to Unit     Code 2 - 1:1 Staff Supervision     For ECT only     Code 2 - 1:1 Staff Supervision     For ECT only     Electroconvulsive therapy     Series of up to 12 treatments. Begin Date: 2/26/20   Treating Psychiatrist providing ECT: Doug Devi MD   Notified on: 2/25/20     Electroconvulsive therapy     Series of up to 12 treatments. Begin Date: 2/26/20   Treating Psychiatrist providing ECT: Doug Devi MD   Notified on: 2/25/20     Electroconvulsive therapy     Series of up to 12 treatments. Begin Date: 2/26/20   Treating Psychiatrist providing ECT: Doug Devi MD   Notified on: 2/25/20     Electroconvulsive therapy     Series of up to 12 treatments. Begin Date: 2/26/20   Treating Psychiatrist providing ECT: Doug Devi MD   Notified on: 2/25/20     Electroconvulsive therapy     Series of up to 12 treatments. Begin Date: 2/26/20   Treating Psychiatrist providing ECT: Doug Ceballos" MD Marito   Notified on: 2/25/20     Electroconvulsive therapy     Series of up to 12 treatments. Begin Date: 2/26/20   Treating Psychiatrist providing ECT: Doug Devi MD   Notified on: 2/25/20     Electroconvulsive therapy     Series of up to 12 treatments. Begin Date: 2/26/20   Treating Psychiatrist providing ECT: Doug Devi MD   Notified on: 2/25/20     Fall precautions     Fall precautions     Routine Programming     As clinically indicated     Status 15     Every 15 minutes.          DIagnoses:     Major depressive disorder-recurrent, severe  Generalized anxiety disorder  Unspecified personality disorder       Plan:     Continue her current medications.  We will obtain BMP tomorrow morning.    Treatment with ECT has been resumed while under a [facility].  Treatment notes reviewed and treatment continues to proceed without complications.  The patient is currently allowed treatment twice a week under her current [facility] order which will be scheduled on Mondays and Fridays.  Documentation has been submitted to the court requesting permission to utilize ECT 3 times a week.    Psychosocial treatments to be addressed with social work consult and groups.    -Once she is agreeable, neuropsychological testing may be helpful to identify any underlying personality characteristics that may be complicating her treatment course.  -Patient is applying for medical assistance.    Legal Status: full commitment with [facility]    Disposition: Anticipate transitioning into an intensive residential treatment program after achieving adequate improvement in mood and remission of suicidal thoughts.

## 2020-03-27 NOTE — PLAN OF CARE
"Pt presents with a blunt affect and a calm mood. Pt reports feeling anxious, but states that she has been feeling less depressed. Pt stated, \" I don't know if it is the shocks or the meds\". Pt reported feeling anxious about court tomorrow. Pt attended groups and participated appropriately. Pt reported having \"foggy memory\", and she had a delayed response to questions, if any. Pt stated that she made some calls to family and wrote letters as well. Pt states that there are thoughts of SI, but that they are burried now. Pt contracts for safety. Pt's judgment and insight are impaired.   "

## 2020-03-27 NOTE — PLAN OF CARE
Problem: Adult Behavioral Health Plan of Care  Goal: Patient-Specific Goal (Individualization)  Description: 1. Absence of suicidal ideation with safety plan in place.  2. Effective medication regime with patient compliance.  3. Stabilization of mood and thought processes.  4. Improved insight into mental health issues.  5. Able to identify and utilize positive coping skills.  6. Plan in place for ongoing treatment and support.     Outcome: No Change  Note: Pt has been present on the lounge and has been social with peers. Pt has been attending groups and participating in milieu activities. Pt remains depressed but does brightened upon approach and has moments of laughter. Pt does has SI but no stated plan. Pt has eaten her meals which are mostly fruit and liquids. Pt is pleasant and cooperative. Pt encouraged to focus on positive things in her life such as her kids. Pt is planning on talking to them later today. Nursing to continue to monitor.

## 2020-03-27 NOTE — PLAN OF CARE
Pt  attended 2 of 2 OT groups today. Pt participated minimally in discussion addressing illness management, despite cues, however, was attentive throughout. Pt transitioned to PM mindfulness group IND and with direct VCs, participated in a mindfulness group focusing on reduction of anxiety, improvement of mood, and increase in concentration. The mindfulness practice was offered via supportive approaches including seated meditation and mindful listening. Pt will continue to benefit from OT intervention to address implementation of positive functional coping skills, role performance, and community reintegration.

## 2020-03-28 LAB
ANION GAP SERPL CALCULATED.3IONS-SCNC: 3 MMOL/L (ref 3–14)
BUN SERPL-MCNC: 16 MG/DL (ref 7–30)
CALCIUM SERPL-MCNC: 8.9 MG/DL (ref 8.5–10.1)
CHLORIDE SERPL-SCNC: 105 MMOL/L (ref 94–109)
CO2 SERPL-SCNC: 31 MMOL/L (ref 20–32)
CREAT SERPL-MCNC: 0.96 MG/DL (ref 0.52–1.04)
GFR SERPL CREATININE-BSD FRML MDRD: 76 ML/MIN/{1.73_M2}
GLUCOSE SERPL-MCNC: 87 MG/DL (ref 70–99)
POTASSIUM SERPL-SCNC: 3.9 MMOL/L (ref 3.4–5.3)
SODIUM SERPL-SCNC: 139 MMOL/L (ref 133–144)

## 2020-03-28 PROCEDURE — 25000132 ZZH RX MED GY IP 250 OP 250 PS 637: Performed by: PSYCHIATRY & NEUROLOGY

## 2020-03-28 PROCEDURE — 80048 BASIC METABOLIC PNL TOTAL CA: CPT | Performed by: PSYCHIATRY & NEUROLOGY

## 2020-03-28 PROCEDURE — 36415 COLL VENOUS BLD VENIPUNCTURE: CPT | Performed by: PSYCHIATRY & NEUROLOGY

## 2020-03-28 PROCEDURE — 12400000 ZZH R&B MH

## 2020-03-28 RX ADMIN — LAMOTRIGINE 200 MG: 100 TABLET ORAL at 09:07

## 2020-03-28 RX ADMIN — BREXPIPRAZOLE 3 MG: 1 TABLET ORAL at 09:07

## 2020-03-28 RX ADMIN — LITHIUM CARBONATE 450 MG: 450 TABLET, EXTENDED RELEASE ORAL at 20:03

## 2020-03-28 RX ADMIN — RAMELTEON 8 MG: 8 TABLET ORAL at 21:29

## 2020-03-28 RX ADMIN — MIRTAZAPINE 45 MG: 45 TABLET, FILM COATED ORAL at 21:29

## 2020-03-28 RX ADMIN — LITHIUM CARBONATE 450 MG: 450 TABLET, EXTENDED RELEASE ORAL at 09:07

## 2020-03-28 RX ADMIN — VENLAFAXINE HYDROCHLORIDE 150 MG: 150 CAPSULE, EXTENDED RELEASE ORAL at 09:07

## 2020-03-28 ASSESSMENT — ACTIVITIES OF DAILY LIVING (ADL)
DRESS: INDEPENDENT
ORAL_HYGIENE: INDEPENDENT
HYGIENE/GROOMING: INDEPENDENT

## 2020-03-28 NOTE — PLAN OF CARE
Pt was calm with blunt affect. Pt attended group and played games with peers with prompt. Pt states she has chronic suicidal thoughts with no plan. Pt states that she has already started to starve herself (She was having juices and crackers when I enter her room though!). Pt mentioned that her depression had gotten a little better but it is getting worse again and the reason is that she has not been able to talk to her kids in the past couple days.

## 2020-03-28 NOTE — PLAN OF CARE
Problem: Depressive Symptoms  Goal: Depressive Symptoms  Description: Signs and symptoms of listed problems will be absent or manageable.  Suicidal ideation,   Depression.   Outcome: Improving  Flowsheets (Taken 3/28/2020 6959)  Depressive Symptoms Assessed: all  Depressive Symptoms Present:   mood   thought process   insight   affect  Note: Flat but reactive affect, mood calm. Patient states that she is feeling better and that her depression comes and goes. She ate both breakfast and lunch. Attended unit programming. Spent time in her room reading and working on crossword puzzles. Denies SI. Med compliant

## 2020-03-29 PROCEDURE — 25000132 ZZH RX MED GY IP 250 OP 250 PS 637: Performed by: PSYCHIATRY & NEUROLOGY

## 2020-03-29 PROCEDURE — 12400000 ZZH R&B MH

## 2020-03-29 RX ADMIN — LITHIUM CARBONATE 450 MG: 450 TABLET, EXTENDED RELEASE ORAL at 20:49

## 2020-03-29 RX ADMIN — VENLAFAXINE HYDROCHLORIDE 150 MG: 150 CAPSULE, EXTENDED RELEASE ORAL at 09:08

## 2020-03-29 RX ADMIN — LAMOTRIGINE 200 MG: 100 TABLET ORAL at 09:08

## 2020-03-29 RX ADMIN — BREXPIPRAZOLE 3 MG: 1 TABLET ORAL at 09:08

## 2020-03-29 RX ADMIN — LITHIUM CARBONATE 450 MG: 450 TABLET, EXTENDED RELEASE ORAL at 09:08

## 2020-03-29 RX ADMIN — MIRTAZAPINE 45 MG: 45 TABLET, FILM COATED ORAL at 20:49

## 2020-03-29 RX ADMIN — RAMELTEON 8 MG: 8 TABLET ORAL at 20:49

## 2020-03-29 ASSESSMENT — MIFFLIN-ST. JEOR: SCORE: 1722.36

## 2020-03-29 ASSESSMENT — ACTIVITIES OF DAILY LIVING (ADL)
LAUNDRY: WITH SUPERVISION
DRESS: SCRUBS (BEHAVIORAL HEALTH);INDEPENDENT
HYGIENE/GROOMING: INDEPENDENT;PROMPTS
DRESS: INDEPENDENT
ORAL_HYGIENE: INDEPENDENT
ORAL_HYGIENE: PROMPTS;INDEPENDENT
HYGIENE/GROOMING: INDEPENDENT

## 2020-03-29 NOTE — PLAN OF CARE
Pt denies SIB but reports chronic suicidal thoughts, denying a suicidal plan.  Additionally she denies AH and VH. She reports that she feels safe on the unit. Pt rates depression as 5/10 and anxiety as a 2/10. During the evening shift pt was at times present in the milieu. She spoke to her child today and did not have a goal for the day. Pt responds well to fluid intake encouragement. Evening shift was otherwise unremarkable.

## 2020-03-29 NOTE — PLAN OF CARE
Problem: Depressive Symptoms  Goal: Depressive Symptoms  Description: Signs and symptoms of listed problems will be absent or manageable.  Suicidal ideation,   Depression.   Outcome: No Change  Flowsheets (Taken 3/29/2020 1308)  Depressive Symptoms Assessed: all  Depressive Symptoms Present:   other (see comment)   insight   thought process   affect   mood  Note: Pt presents with a blunted affect, depressed mood. Pt states that she is bored and feels a lack of energy today. She has been out in the [place] with prompting, but continues to be withdrawn. Insight remains poor with judgement impaired. Negative thoughts and SI continue to be chronic but passive - contracts for safety. Pt complained of lightheadedness that began this morning after waking up, and was encouraged to drink more water with other fluids. Vitals WDL, ate breakfast and lunch.

## 2020-03-30 ENCOUNTER — ANESTHESIA (OUTPATIENT)
Dept: SURGERY | Facility: CLINIC | Age: 35
End: 2020-03-30

## 2020-03-30 ENCOUNTER — APPOINTMENT (OUTPATIENT)
Dept: SURGERY | Facility: CLINIC | Age: 35
End: 2020-03-30
Payer: COMMERCIAL

## 2020-03-30 ENCOUNTER — ANESTHESIA EVENT (OUTPATIENT)
Dept: SURGERY | Facility: CLINIC | Age: 35
End: 2020-03-30

## 2020-03-30 PROCEDURE — 25000128 H RX IP 250 OP 636: Performed by: NURSE ANESTHETIST, CERTIFIED REGISTERED

## 2020-03-30 PROCEDURE — 90870 ELECTROCONVULSIVE THERAPY: CPT

## 2020-03-30 PROCEDURE — 25000125 ZZHC RX 250: Performed by: NURSE ANESTHETIST, CERTIFIED REGISTERED

## 2020-03-30 PROCEDURE — G0177 OPPS/PHP; TRAIN & EDUC SERV: HCPCS

## 2020-03-30 PROCEDURE — 25000132 ZZH RX MED GY IP 250 OP 250 PS 637: Performed by: PSYCHIATRY & NEUROLOGY

## 2020-03-30 PROCEDURE — 25800030 ZZH RX IP 258 OP 636: Performed by: SURGERY

## 2020-03-30 PROCEDURE — 40000671 ZZH STATISTIC ANESTHESIA CASE

## 2020-03-30 PROCEDURE — 12400000 ZZH R&B MH

## 2020-03-30 PROCEDURE — 99232 SBSQ HOSP IP/OBS MODERATE 35: CPT | Performed by: PSYCHIATRY & NEUROLOGY

## 2020-03-30 RX ORDER — SODIUM CHLORIDE, SODIUM LACTATE, POTASSIUM CHLORIDE, CALCIUM CHLORIDE 600; 310; 30; 20 MG/100ML; MG/100ML; MG/100ML; MG/100ML
500 INJECTION, SOLUTION INTRAVENOUS CONTINUOUS
Status: DISCONTINUED | OUTPATIENT
Start: 2020-03-30 | End: 2020-03-30

## 2020-03-30 RX ADMIN — RAMELTEON 8 MG: 8 TABLET ORAL at 21:09

## 2020-03-30 RX ADMIN — LITHIUM CARBONATE 450 MG: 450 TABLET, EXTENDED RELEASE ORAL at 07:59

## 2020-03-30 RX ADMIN — BREXPIPRAZOLE 3 MG: 1 TABLET ORAL at 07:59

## 2020-03-30 RX ADMIN — SUCCINYLCHOLINE CHLORIDE 100 MG: 20 INJECTION, SOLUTION INTRAMUSCULAR; INTRAVENOUS; PARENTERAL at 06:36

## 2020-03-30 RX ADMIN — SODIUM CHLORIDE, POTASSIUM CHLORIDE, SODIUM LACTATE AND CALCIUM CHLORIDE 500 ML: 600; 310; 30; 20 INJECTION, SOLUTION INTRAVENOUS at 06:06

## 2020-03-30 RX ADMIN — METHOHEXITAL SODIUM 100 MG: 500 INJECTION, POWDER, LYOPHILIZED, FOR SOLUTION INTRAMUSCULAR; INTRAVENOUS; RECTAL at 06:35

## 2020-03-30 RX ADMIN — MIRTAZAPINE 45 MG: 45 TABLET, FILM COATED ORAL at 21:09

## 2020-03-30 RX ADMIN — VENLAFAXINE HYDROCHLORIDE 150 MG: 150 CAPSULE, EXTENDED RELEASE ORAL at 07:59

## 2020-03-30 RX ADMIN — LAMOTRIGINE 200 MG: 100 TABLET ORAL at 07:59

## 2020-03-30 RX ADMIN — LITHIUM CARBONATE 450 MG: 450 TABLET, EXTENDED RELEASE ORAL at 21:09

## 2020-03-30 ASSESSMENT — LIFESTYLE VARIABLES: TOBACCO_USE: 0

## 2020-03-30 NOTE — PLAN OF CARE
Patient was monitored via in-person 15 minute checks and appeared asleep throughout the shift. Patient was up at 0530 for ECT. No safety concerns noted. Will continue to monitor.

## 2020-03-30 NOTE — PLAN OF CARE
Problem: Depressive Symptoms  Goal: Depressive Symptoms  Description: Signs and symptoms of listed problems will be absent or manageable.  Suicidal ideation,   Depression.   Flowsheets (Taken 3/30/2020 1318)  Depressive Symptoms Assessed: all  Depressive Symptoms Present:   mood   insight   affect  Note: Today patient denies suicidal ideation. She stated that she is 60% better than when she came in. She has spent most of the day in her room sleeping but has come out to eat. She stated that she ate breakfast only and does not eat lunch or dinner. Encouraging her to drink fluids as she complained of dizziness in the am after ECT. She has drank at least 4 orange juices besides liquid on her tray.

## 2020-03-30 NOTE — ANESTHESIA POSTPROCEDURE EVALUATION
Patient: Misty Parham    * No procedures listed *    Diagnosis:* No pre-op diagnosis entered *  Diagnosis Additional Information: No value filed.    Anesthesia Type:  No value filed.    Note:  Anesthesia Post Evaluation    Patient location during evaluation: Bedside  Patient participation: Able to fully participate in evaluation  Level of consciousness: awake and alert  Pain management: adequate  Airway patency: patent  Cardiovascular status: acceptable  Respiratory status: acceptable  Hydration status: acceptable  PONV: none             Last vitals:  Vitals:    03/30/20 0705 03/30/20 0710 03/30/20 0715   BP: 113/67 131/89    Pulse: 81 93    Resp: 11 18 14   Temp:      SpO2: 98% 98% 95%         Electronically Signed By: Pedro Finley MD  March 30, 2020  7:20 AM

## 2020-03-30 NOTE — PROGRESS NOTES
[facility]  Psychiatric Progress Note    Length of stay (days): 38        Interim History:   The patient's care was discussed with the treatment team during the daily team meeting and/or staff's chart notes were reviewed.  Staff report: No acute issues overnight.  Over the weekend, she had engaged in intermittent and mild SIB which she reported to staff. No significant injuries reported. Intermittent moments where she refuses food and fluid as a means of starvation.    ECT was conducted this morning without complications.    Depression severity scale 0-10 (10=most severe):  Today: 5    ECT was conducted this morning without complications.  She denied any discomfort post treatment.    She explains that her mood has been improving although moments of symptom intensity occur intermittently, during which time urges for self-injurious behavior become more prominent, and she gains improvement in her mood by engaging in self-injurious behavior.  This is described as a chronic symptom.  She does admit that she occasionally experiences urges to starve herself to death however does not harbor this plan at the moment.  She ate breakfast this morning.    Energy is fair today.  Appetite is normal.  Concentration is low and has been persistent throughout the day.    No active suicidal thoughts however she does admit to suicidal thoughts intermittently throughout the day.  She would not feel safe being home due to the occurrence of suicidal thoughts.  No homicidal thoughts reported.    Tolerating medications without side effects.    She is agreeable to pursuing residential treatment however does not feel ready enough to transition into that treatment mode yet.           Medications:       brexpiprazole  3 mg Oral Daily     lamoTRIgine  200 mg Oral Daily     lithium ER  450 mg Oral Q12H JASMINE     mirtazapine  45 mg Oral At Bedtime     ramelteon  8 mg Oral At Bedtime     venlafaxine  150 mg Oral Daily with breakfast  "         Allergies:   No Known Allergies       Labs:     No results found for this or any previous visit (from the past 24 hour(s)).       Psychiatric Examination:     /71   Pulse 89   Temp 97.8  F (36.6  C) (Oral)   Resp 16   Ht 1.651 m (5' 5\")   Wt 103.1 kg (227 lb 6.4 oz)   SpO2 99%   BMI 37.84 kg/m    Weight is 227 lbs 6.4 oz  Body mass index is 37.84 kg/m .  Orthostatic Vitals     None            Appearance: awake, alert  Attitude:  cooperative  Eye Contact:  fair  Mood:  depressed  Affect:  intensity is blunted  Speech:  decreased prosody  Psychomotor Behavior:  no evidence of tardive dyskinesia, dystonia, or tics  Throught Process:  linear  Associations:  no loose associations  Thought Content:  no evidence of psychotic thought and active suicidal ideation present  Insight:  partial  Judgement:  fair  Oriented to:  time, person, and place  Attention Span and Concentration:  fair  Recent and Remote Memory:  intact    Clinical Global Impressions  First:     Most recent:            Precautions:     Behavioral Orders   Procedures     Code 1 - Restrict to Unit     Code 2 - 1:1 Staff Supervision     For ECT only     Code 2 - 1:1 Staff Supervision     For ECT only     Electroconvulsive therapy     Series of up to 12 treatments. Begin Date: 2/26/20   Treating Psychiatrist providing ECT: Doug Devi MD   Notified on: 2/25/20     Electroconvulsive therapy     Series of up to 12 treatments. Begin Date: 2/26/20   Treating Psychiatrist providing ECT: Doug Devi MD   Notified on: 2/25/20     Electroconvulsive therapy     Series of up to 12 treatments. Begin Date: 2/26/20   Treating Psychiatrist providing ECT: Doug Devi MD   Notified on: 2/25/20     Electroconvulsive therapy     Series of up to 12 treatments. Begin Date: 2/26/20   Treating Psychiatrist providing ECT: Doug Devi MD   Notified on: 2/25/20     Electroconvulsive therapy     Series of up to 12 " treatments. Begin Date: 2/26/20   Treating Psychiatrist providing ECT: Doug Devi MD   Notified on: 2/25/20     Electroconvulsive therapy     Series of up to 12 treatments. Begin Date: 2/26/20   Treating Psychiatrist providing ECT: Doug Devi MD   Notified on: 2/25/20     Electroconvulsive therapy     Series of up to 12 treatments. Begin Date: 2/26/20   Treating Psychiatrist providing ECT: Doug Devi MD   Notified on: 2/25/20     Fall precautions     Fall precautions     Routine Programming     As clinically indicated     Status 15     Every 15 minutes.          DIagnoses:     Major depressive disorder-recurrent, severe  Generalized anxiety disorder  Unspecified personality disorder         Plan:     Plan to optimize Effexor targeting symptoms of her mood and anxiety disorder.  Continue augmentation with mirtazapine.  Continue augmentation with Rexulti.  New addition of lithium noted as an additional augmenter.  Lithium level noted to be 0.95 on March 25.  Plan to check another serum level later this week to ensure stability.    Treatment with ECT has been resumed while under a [facility].  Treatment will be continued twice a week, Monday and Wednesday, as we await the court's decision regarding my petition to utilize ECT 3 times a week.    Psychosocial treatments to be addressed with social work consult and groups.  Consider neuropsychological testing to explore underlying personality characteristics that could be complicating her treatment course.    Legal Status: full commitment with [facility]    Disposition: Explore intensive residential treatment services once the patient has the appropriate funding source for this treatment mode.

## 2020-03-30 NOTE — ANESTHESIA CARE TRANSFER NOTE
Patient: Misty Parham    * No procedures listed *    Diagnosis: * No pre-op diagnosis entered *  Diagnosis Additional Information: No value filed.    Anesthesia Type:   No value filed.     Note:  Airway :Nasal Cannula  Patient transferred to:PACU  Comments: Native airway general anesthetic.  Patient hyperventilated with 100% oxygen via mask prior to treatment.   Anesthesia induced using patent peripheral IV.    Bite block placed between molars to protect teeth and tongue.   After induction of seizure patient mask ventilated with 100% oxygen until spontaneous respirations returned.     At time of handoff to PACU, patient exhibited spontaneous respirations, adequate tidal volumes, airway patent. Oxygen via nasal cannula at 3 liters per minute to PACU in cart with siderails up, connected to wall O2 in PACU. All monitors and alarms on and functioning. Report given to PACU RN and questions answered.   Handoff Report: Identifed the Patient, Identified the Reponsible Provider, Reviewed the pertinent medical history, Discussed the surgical course, Reviewed Intra-OP anesthesia mangement and issues during anesthesia, Set expectations for post-procedure period and Allowed opportunity for questions and acknowledgement of understanding      Vitals: (Last set prior to Anesthesia Care Transfer)    CRNA VITALS  3/30/2020 0613 - 3/30/2020 0643      3/30/2020             Pulse:  102    SpO2:  97 %    Resp Rate (observed):  16    Resp Rate (set):  10                Electronically Signed By: WILLIAN Ramos CRNA  March 30, 2020  6:43 AM

## 2020-03-30 NOTE — PLAN OF CARE
Pt transitioned to OT group with MIN encouragement and engaged in therapeutic activity addressing functional cognition and interpersonal skills with task set up. With initial task set up, pt participated in therapeutic group activity and discussion addressing role performance. Pt ID'd roles she currently has and responsibilities of each role (e.g. mother, wife, home maintainer). Additionally, Pt ID'd what she does well in each role and what she could improve (e.g. set more limits and boundaries with her children). Problem-solved ways to implement changes. Pt will continue to benefit from OT intervention to address implementation of positive functional coping skills, role performance, and community reintegration.

## 2020-03-30 NOTE — ANESTHESIA PREPROCEDURE EVALUATION
Anesthesia Pre-Procedure Evaluation    Patient: Misty Parham   MRN: [id number] : 1985          Preoperative Diagnosis: * No pre-op diagnosis entered *    * No procedures listed *    Past Medical History:   Diagnosis Date     Depressive disorder      Past Surgical History:   Procedure Laterality Date     CHOLECYSTECTOMY         Anesthesia Evaluation     . Pt has had prior anesthetic. Type: General    No history of anesthetic complications          ROS/MED HX    ENT/Pulmonary:      (-) tobacco use and sleep apnea   Neurologic:      (-) Delerium   Cardiovascular:  - neg cardiovascular ROS   (+) ----. : . . . :. . Previous cardiac testing date:results:date: results:ECG reviewed date:2020 results:NSR date: results:         (-) hypertension   METS/Exercise Tolerance:  >4 METS   Hematologic:         Musculoskeletal:         GI/Hepatic:        (-) GERD   Renal/Genitourinary:  - ROS Renal section negative       Endo: Comment: BMI 37    (+) Obesity (BMI 37), .      Psychiatric:     (+) psychiatric history (severe, requring ECT) depression      Infectious Disease:         Malignancy:         Other:    (+) No chance of pregnancy                                Lab Results   Component Value Date    WBC 9.2 2020    HGB 12.7 2020    HCT 39.1 2020     2020     2020    POTASSIUM 3.9 2020    CHLORIDE 105 2020    CO2 31 2020    BUN 16 2020    CR 0.96 2020    GLC 87 2020    CRISTINE 8.9 2020    ALBUMIN 3.8 2020    PROTTOTAL 7.6 2020    ALT 15 2020    AST 13 2020    ALKPHOS 78 2020    BILITOTAL 0.4 2020    TSH 1.08 2020    HCG Negative 2020    HCGS Negative 2020       Preop Vitals  BP Readings from Last 3 Encounters:   20 126/80   20 120/88   20 114/83    Pulse Readings from Last 3 Encounters:   20 80   20 109   20 95      Resp Readings from Last 3 Encounters:  "  03/30/20 20   02/21/20 16   02/18/20 16    SpO2 Readings from Last 3 Encounters:   03/30/20 97%   02/21/20 97%   02/18/20 99%      Temp Readings from Last 1 Encounters:   03/30/20 36.8  C (98.2  F) (Temporal)    Ht Readings from Last 1 Encounters:   03/30/20 1.651 m (5' 5\")      Wt Readings from Last 1 Encounters:   03/29/20 103.1 kg (227 lb 6.4 oz)    Estimated body mass index is 37.84 kg/m  as calculated from the following:    Height as of this encounter: 1.651 m (5' 5\").    Weight as of this encounter: 103.1 kg (227 lb 6.4 oz).       Anesthesia Plan      History & Physical Review  History and physical reviewed and following examination; no interval change.    ASA Status:  2 .    NPO Status:  > 8 hours    Plan for General (Mask ventilation) with Intravenous induction.   PONV prophylaxis:  Ondansetron (or other 5HT-3)         Postoperative Care  Postoperative pain management:  Multi-modal analgesia.      Consents  Anesthetic plan, risks, benefits and alternatives discussed with:  Patient..                 Pedro Finley MD  "

## 2020-03-30 NOTE — PLAN OF CARE
BEHAVIORAL TEAM DISCUSSION    Participants: MD, RN, CM, PA, OT   Progress: Improving  Anticipated length of stay: Unknown. Until psychiatrically stable. Travis Sr filed for patient to get additional ECT treatments.   Continued Stay Criteria/Rationale:  Continued ECT  Medical/Physical: Per  hospitalist consult  Precautions:   Behavioral Orders   Procedures    Code 1 - Restrict to Unit    Code 2 - 1:1 Staff Supervision     For ECT only    Code 2 - 1:1 Staff Supervision     For ECT only    Electroconvulsive therapy     Series of up to 12 treatments. Begin Date: 2/26/20   Treating Psychiatrist providing ECT: Doug Devi MD   Notified on: 2/25/20    Electroconvulsive therapy     Series of up to 12 treatments. Begin Date: 2/26/20   Treating Psychiatrist providing ECT: Doug Devi MD   Notified on: 2/25/20    Electroconvulsive therapy     Series of up to 12 treatments. Begin Date: 2/26/20   Treating Psychiatrist providing ECT: Doug Devi MD   Notified on: 2/25/20    Electroconvulsive therapy     Series of up to 12 treatments. Begin Date: 2/26/20   Treating Psychiatrist providing ECT: Doug Devi MD   Notified on: 2/25/20    Electroconvulsive therapy     Series of up to 12 treatments. Begin Date: 2/26/20   Treating Psychiatrist providing ECT: Doug Devi MD   Notified on: 2/25/20    Electroconvulsive therapy     Series of up to 12 treatments. Begin Date: 2/26/20   Treating Psychiatrist providing ECT: Doug Devi MD   Notified on: 2/25/20    Electroconvulsive therapy     Series of up to 12 treatments. Begin Date: 2/26/20   Treating Psychiatrist providing ECT: Doug Devi MD   Notified on: 2/25/20    Fall precautions    Fall precautions    Routine Programming     As clinically indicated    Status 15     Every 15 minutes.     Plan: Travis Sr hearing on Friday 04/03. Continue twice weekly ECT    Rationale for change in  precautions or plan: Continued stabilization.

## 2020-03-30 NOTE — PLAN OF CARE
Pt reports chronic suicidal thoughts and denies a plan.She rates depression as 10/10 and anxiety as 4/10. Her affect was blunt/sad and her mood was calm. She states when she woke up this morning she felt light headed but is no longer experiencing lightheadedness. During the evening shift, pt migrated between her room and the milieu.

## 2020-03-30 NOTE — PROCEDURES
[facility] ECT Procedure Note     Misty Parham [id number]   34 year old 1985     Patient Status: Inpatient    No Known Allergies    Weight:  227 lbs 6.4 oz    Patient Preparations: Glasses/Contacts removed         Diagnosis:   Major depression     Medications ineffective, Deteriorating fluid/electrolyte/nutritional status, History of good ECT response in one or more previous episodes of illness and currently receiving maintenance  ECT per lucas truong order 2X per week for duration of commitment. She reports improvement in her mood since starting lithium       Pause for the Cause:     Right patient Yes   Right procedure/laterality settings: Yes   Right diagnosis Yes          Intra-Procedure Documentation:     Date:  3/30/2020  Time:  6:36AM    ECT #    Treatment number this series: 10   Total treatment number: 10   Type of ECT:  Bilateral, standard    ECT Medications administered: Brevital: 100mg  Succinyl Choline: 100mg         Clinical Narrative:     ECT was administered by Thymatron machine.  Pt has been medically cleared for procedure, consent not needed as ECT tx is being done under court order. Side effects, Risks and benefits reviewed. Misty is IP, receiving ECT under price truong order, 2 ECT tx per week for duration of committment M/W schedule until price-truong is amended for more frequent treatment-we are still waiting on this    ECT Strip Summary:     Energy Level: 95 percent  Motor Seizure Duration:27  seconds  EEG Seizure Duration: 27 seconds    Complications: none  Plan: 11th ECT 4/1/2020 per price truong order for duration of committment. Lithium initiated for augmentation    Doug Devi MD

## 2020-03-30 NOTE — PROGRESS NOTES
Court order for patient's Travis Sr hearing received today. Copy was given to patient and placed in patient's chart. The court exam and hearing will be held via conference call on Friday 04/03/2020. The exam is scheduled for 1300 with the hearing to follow at 1345. [facility]

## 2020-03-31 PROCEDURE — 25000132 ZZH RX MED GY IP 250 OP 250 PS 637: Performed by: PSYCHIATRY & NEUROLOGY

## 2020-03-31 PROCEDURE — 99232 SBSQ HOSP IP/OBS MODERATE 35: CPT | Performed by: PSYCHIATRY & NEUROLOGY

## 2020-03-31 PROCEDURE — 12400000 ZZH R&B MH

## 2020-03-31 RX ADMIN — MIRTAZAPINE 45 MG: 45 TABLET, FILM COATED ORAL at 21:04

## 2020-03-31 RX ADMIN — LAMOTRIGINE 200 MG: 100 TABLET ORAL at 08:29

## 2020-03-31 RX ADMIN — BREXPIPRAZOLE 3 MG: 1 TABLET ORAL at 08:29

## 2020-03-31 RX ADMIN — RAMELTEON 8 MG: 8 TABLET ORAL at 21:04

## 2020-03-31 RX ADMIN — LITHIUM CARBONATE 450 MG: 450 TABLET, EXTENDED RELEASE ORAL at 21:00

## 2020-03-31 RX ADMIN — LITHIUM CARBONATE 450 MG: 450 TABLET, EXTENDED RELEASE ORAL at 08:29

## 2020-03-31 RX ADMIN — VENLAFAXINE HYDROCHLORIDE 150 MG: 150 CAPSULE, EXTENDED RELEASE ORAL at 08:29

## 2020-03-31 RX ADMIN — HYDROXYZINE HYDROCHLORIDE 50 MG: 25 TABLET, FILM COATED ORAL at 14:59

## 2020-03-31 ASSESSMENT — ACTIVITIES OF DAILY LIVING (ADL)
DRESS: SCRUBS (BEHAVIORAL HEALTH)
LAUNDRY: WITH SUPERVISION
ORAL_HYGIENE: INDEPENDENT
ORAL_HYGIENE: INDEPENDENT
HYGIENE/GROOMING: INDEPENDENT
DRESS: INDEPENDENT
HYGIENE/GROOMING: INDEPENDENT

## 2020-03-31 ASSESSMENT — MIFFLIN-ST. JEOR: SCORE: 1729.16

## 2020-03-31 NOTE — PROGRESS NOTES
"[facility]  Psychiatric Progress Note    Length of stay (days): 39        Interim History:   The patient's care was discussed with the treatment team during the daily team meeting and/or staff's chart notes were reviewed.  Staff report: No acute issues overnight.  She has only been eating breakfast and skipping the remainder of her meals.  Reporting suicidal ideation at times.    Depression severity scale 0-10 (10=most severe):  Today: 8    Mood is worse today which she attributes to new psychosocial stressors involving her son and an unexpected change in his living environment.  The instability of her family's living environment seems to be an ongoing trigger for her.  She is not certain where her  is residing at the moment and may be homeless.  She would ideally like her family to reunite under one household however financial hardships remain a barrier.    Energy is low, appetite is low, concentration is poor.    Suicidal thoughts are present however she contracts for safety on the unit.  No homicidal thoughts reported.    Tolerating medications without side effects.    She is agreeable to pursuing residential treatment however does not feel ready enough to transition into that treatment mode yet.           Medications:       brexpiprazole  3 mg Oral Daily     lamoTRIgine  200 mg Oral Daily     lithium ER  450 mg Oral Q12H JASMINE     mirtazapine  45 mg Oral At Bedtime     ramelteon  8 mg Oral At Bedtime     venlafaxine  150 mg Oral Daily with breakfast          Allergies:   No Known Allergies       Labs:     No results found for this or any previous visit (from the past 24 hour(s)).       Psychiatric Examination:     /74   Pulse 87   Temp 98.4  F (36.9  C) (Oral)   Resp 16   Ht 1.651 m (5' 5\")   Wt 103.8 kg (228 lb 14.4 oz)   SpO2 97%   BMI 38.09 kg/m    Weight is 228 lbs 14.4 oz  Body mass index is 38.09 kg/m .  Orthostatic Vitals     None            Appearance: awake, " alert  Attitude:  cooperative  Eye Contact:  fair  Mood:  depressed  Affect:  intensity is blunted  Speech:  decreased prosody  Psychomotor Behavior:  no evidence of tardive dyskinesia, dystonia, or tics  Throught Process:  linear  Associations:  no loose associations  Thought Content:  no evidence of psychotic thought and active suicidal ideation present  Insight:  partial  Judgement:  fair  Oriented to:  time, person, and place  Attention Span and Concentration:  fair  Recent and Remote Memory:  intact    Clinical Global Impressions  First:     Most recent:            Precautions:     Behavioral Orders   Procedures     Code 1 - Restrict to Unit     Code 2 - 1:1 Staff Supervision     For ECT only     Code 2 - 1:1 Staff Supervision     For ECT only     Electroconvulsive therapy     Series of up to 12 treatments. Begin Date: 2/26/20   Treating Psychiatrist providing ECT: Doug Devi MD   Notified on: 2/25/20     Electroconvulsive therapy     Series of up to 12 treatments. Begin Date: 2/26/20   Treating Psychiatrist providing ECT: Doug Devi MD   Notified on: 2/25/20     Electroconvulsive therapy     Series of up to 12 treatments. Begin Date: 2/26/20   Treating Psychiatrist providing ECT: Doug Devi MD   Notified on: 2/25/20     Electroconvulsive therapy     Series of up to 12 treatments. Begin Date: 2/26/20   Treating Psychiatrist providing ECT: Doug Devi MD   Notified on: 2/25/20     Electroconvulsive therapy     Series of up to 12 treatments. Begin Date: 2/26/20   Treating Psychiatrist providing ECT: Doug Devi MD   Notified on: 2/25/20     Electroconvulsive therapy     Series of up to 12 treatments. Begin Date: 2/26/20   Treating Psychiatrist providing ECT: Doug Devi MD   Notified on: 2/25/20     Electroconvulsive therapy     Series of up to 12 treatments. Begin Date: 2/26/20   Treating Psychiatrist providing ECT: Doug Ceballos  MD Marito   Notified on: 2/25/20     Fall precautions     Fall precautions     Routine Programming     As clinically indicated     Status 15     Every 15 minutes.          DIagnoses:     Major depressive disorder-recurrent, severe  Generalized anxiety disorder  Unspecified personality disorder         Plan:     Plan to optimize Effexor targeting symptoms of her mood and anxiety disorder.  Continue augmentation with mirtazapine.  Continue augmentation with Rexulti.  New addition of lithium noted as an additional augmenter.  Lithium level noted to be 0.95 on March 25.  Plan to check another serum level later this week to ensure stability.    Treatment with ECT has been resumed while under a Travis Rinconpherd.  Treatment will be continued twice a week, Monday and Wednesday, as we await the court's decision regarding my petition to utilize ECT 3 times a week.    Psychosocial treatments to be addressed with social work consult and groups.  Consider neuropsychological testing to explore underlying personality characteristics that could be complicating her treatment course.    Legal Status: full commitment with Travis Andersen    Disposition: Explore intensive residential treatment services once the patient has the appropriate funding source for this treatment mode.

## 2020-03-31 NOTE — PLAN OF CARE
Patient presents with a flat affect and depressed mood. Patient is withdrawn and isolative to her room. Patient stated she is anxious because she couldn't get a hold of her children earlier today. Patient spent time in the lounge watching TV but did not interact with anyone. Patient ate 20% of her meal and stated she was not hungry. Patient retreated her room and remains asleep for the duration of the shift.

## 2020-03-31 NOTE — PLAN OF CARE
Pt transitioned to OT group with MIN encouragement and participated in therapeutic group activity and discussion addressing wellness with direct verbal cues. Pt required direct prompting to contribute to discussion, ID'ing something she is proud of (raising her kids), healthy coping skills she uses (listening to music), and positive supports in her life (, sisters). Pt will continue to benefit from OT intervention to address implementation of positive functional coping skills, role performance, and community reintegration.

## 2020-03-31 NOTE — PLAN OF CARE
Shift Update: Pt mood is flat/depressed. Thought process is intact. Insight is poor. Pt has passive suicidal ideation. Pt ate 100% of breakfast but only fruit for lunch. Asked why she is doing this she said that if she loses too much weight she could die. Pt did get some news yesterday that her son was having some conflict in his current living situation( Misty's sister) and has to live with her sister in law. She stated that this is part of her increased depression but that her moods cycle frequently during the day anyway. Pt is cutting or using any objects currently for SIB.

## 2020-04-01 ENCOUNTER — ANESTHESIA EVENT (OUTPATIENT)
Dept: SURGERY | Facility: CLINIC | Age: 35
End: 2020-04-01

## 2020-04-01 ENCOUNTER — APPOINTMENT (OUTPATIENT)
Dept: SURGERY | Facility: CLINIC | Age: 35
End: 2020-04-01
Payer: COMMERCIAL

## 2020-04-01 ENCOUNTER — ANESTHESIA (OUTPATIENT)
Dept: SURGERY | Facility: CLINIC | Age: 35
End: 2020-04-01

## 2020-04-01 PROCEDURE — 40000671 ZZH STATISTIC ANESTHESIA CASE

## 2020-04-01 PROCEDURE — 25000132 ZZH RX MED GY IP 250 OP 250 PS 637: Performed by: PSYCHIATRY & NEUROLOGY

## 2020-04-01 PROCEDURE — 99232 SBSQ HOSP IP/OBS MODERATE 35: CPT | Mod: 25 | Performed by: PSYCHIATRY & NEUROLOGY

## 2020-04-01 PROCEDURE — 25800030 ZZH RX IP 258 OP 636: Performed by: ANESTHESIOLOGY

## 2020-04-01 PROCEDURE — 25000125 ZZHC RX 250: Performed by: NURSE ANESTHETIST, CERTIFIED REGISTERED

## 2020-04-01 PROCEDURE — 90870 ELECTROCONVULSIVE THERAPY: CPT

## 2020-04-01 PROCEDURE — 12400000 ZZH R&B MH

## 2020-04-01 PROCEDURE — 25000128 H RX IP 250 OP 636: Performed by: NURSE ANESTHETIST, CERTIFIED REGISTERED

## 2020-04-01 RX ORDER — SODIUM CHLORIDE, SODIUM LACTATE, POTASSIUM CHLORIDE, CALCIUM CHLORIDE 600; 310; 30; 20 MG/100ML; MG/100ML; MG/100ML; MG/100ML
500 INJECTION, SOLUTION INTRAVENOUS CONTINUOUS
Status: DISCONTINUED | OUTPATIENT
Start: 2020-04-01 | End: 2020-04-10 | Stop reason: CLARIF

## 2020-04-01 RX ADMIN — ACETAMINOPHEN 650 MG: 325 TABLET, FILM COATED ORAL at 18:09

## 2020-04-01 RX ADMIN — BREXPIPRAZOLE 3 MG: 1 TABLET ORAL at 08:06

## 2020-04-01 RX ADMIN — ACETAMINOPHEN 650 MG: 325 TABLET, FILM COATED ORAL at 13:21

## 2020-04-01 RX ADMIN — VENLAFAXINE HYDROCHLORIDE 150 MG: 150 CAPSULE, EXTENDED RELEASE ORAL at 08:06

## 2020-04-01 RX ADMIN — RAMELTEON 8 MG: 8 TABLET ORAL at 20:52

## 2020-04-01 RX ADMIN — METHOHEXITAL SODIUM 100 MG: 500 INJECTION, POWDER, LYOPHILIZED, FOR SOLUTION INTRAMUSCULAR; INTRAVENOUS; RECTAL at 06:41

## 2020-04-01 RX ADMIN — LITHIUM CARBONATE 450 MG: 450 TABLET, EXTENDED RELEASE ORAL at 08:06

## 2020-04-01 RX ADMIN — LAMOTRIGINE 200 MG: 100 TABLET ORAL at 08:06

## 2020-04-01 RX ADMIN — MIRTAZAPINE 45 MG: 45 TABLET, FILM COATED ORAL at 20:52

## 2020-04-01 RX ADMIN — LITHIUM CARBONATE 450 MG: 450 TABLET, EXTENDED RELEASE ORAL at 20:52

## 2020-04-01 RX ADMIN — SODIUM CHLORIDE, POTASSIUM CHLORIDE, SODIUM LACTATE AND CALCIUM CHLORIDE 500 ML: 600; 310; 30; 20 INJECTION, SOLUTION INTRAVENOUS at 06:09

## 2020-04-01 RX ADMIN — SUCCINYLCHOLINE CHLORIDE 100 MG: 20 INJECTION, SOLUTION INTRAMUSCULAR; INTRAVENOUS; PARENTERAL at 06:42

## 2020-04-01 ASSESSMENT — LIFESTYLE VARIABLES: TOBACCO_USE: 0

## 2020-04-01 NOTE — ANESTHESIA PREPROCEDURE EVALUATION
Anesthesia Pre-Procedure Evaluation    Patient: Misty Parham   MRN: [id number] : 1985          Preoperative Diagnosis: * No pre-op diagnosis entered *    * No procedures listed *    Past Medical History:   Diagnosis Date     Depressive disorder      Past Surgical History:   Procedure Laterality Date     CHOLECYSTECTOMY         Anesthesia Evaluation     . Pt has had prior anesthetic. Type: General    No history of anesthetic complications          ROS/MED HX    ENT/Pulmonary:      (-) tobacco use and sleep apnea   Neurologic:      (-) Delerium   Cardiovascular:  - neg cardiovascular ROS   (+) ----. : . . . :. . Previous cardiac testing date:results:date: results:ECG reviewed date:2020 results:NSR date: results:         (-) hypertension   METS/Exercise Tolerance:  >4 METS   Hematologic:         Musculoskeletal:         GI/Hepatic:        (-) GERD   Renal/Genitourinary:  - ROS Renal section negative       Endo: Comment: BMI 37    (+) Obesity (BMI 37), .      Psychiatric:     (+) psychiatric history (severe, requring ECT) depression      Infectious Disease:         Malignancy:         Other:    (+) No chance of pregnancy                                Lab Results   Component Value Date    WBC 9.2 2020    HGB 12.7 2020    HCT 39.1 2020     2020     2020    POTASSIUM 3.9 2020    CHLORIDE 105 2020    CO2 31 2020    BUN 16 2020    CR 0.96 2020    GLC 87 2020    CRISTINE 8.9 2020    ALBUMIN 3.8 2020    PROTTOTAL 7.6 2020    ALT 15 2020    AST 13 2020    ALKPHOS 78 2020    BILITOTAL 0.4 2020    TSH 1.08 2020    HCG Negative 2020    HCGS Negative 2020       Preop Vitals  BP Readings from Last 3 Encounters:   20 116/79   20 120/88   20 114/83    Pulse Readings from Last 3 Encounters:   20 96   20 109   20 95      Resp Readings from Last 3 Encounters:  "  04/01/20 14   02/21/20 16   02/18/20 16    SpO2 Readings from Last 3 Encounters:   04/01/20 98%   02/21/20 97%   02/18/20 99%      Temp Readings from Last 1 Encounters:   04/01/20 36.9  C (98.4  F) (Temporal)    Ht Readings from Last 1 Encounters:   03/30/20 1.651 m (5' 5\")      Wt Readings from Last 1 Encounters:   03/31/20 103.8 kg (228 lb 14.4 oz)    Estimated body mass index is 38.09 kg/m  as calculated from the following:    Height as of this encounter: 1.651 m (5' 5\").    Weight as of this encounter: 103.8 kg (228 lb 14.4 oz).       Anesthesia Plan      History & Physical Review  History and physical reviewed and following examination; no interval change.    ASA Status:  2 .    NPO Status:  > 8 hours    Plan for General (Mask ventilation) with Intravenous induction.            Postoperative Care  Postoperative pain management:  Multi-modal analgesia.      Consents  Anesthetic plan, risks, benefits and alternatives discussed with:  Patient..                 Pedro Finley MD  "

## 2020-04-01 NOTE — PLAN OF CARE
Problem: Depressive Symptoms  Goal: Depressive Symptoms  Description: Signs and symptoms of listed problems will be absent or manageable.  Suicidal ideation,   Depression.   4/1/2020 1326 by Natty Mauricio, RN  Outcome: No Change  Flowsheets (Taken 4/1/2020 1326)  Depressive Symptoms Assessed: all  Depressive Symptoms Present:   mood   thought process   affect  Note: Flat affect, endorses feeling more down today. Patient ECT this morning, she said that she was having some dizziness that lingered into the morning as well as a headache. Visible at times during shift down in [facility],  attended unit programming. Ate 100% of breakfast, but did not eat any lunch Denies SI. Showered. Med compliant  4/1/2020 1324 by Natty Mauricio, RN  Outcome: No Change  Flowsheets (Taken 4/1/2020 1324)  Depressive Symptoms Assessed: all  Depressive Symptoms Present:   mood   affect   thought process

## 2020-04-01 NOTE — PLAN OF CARE
Pt invited to evening groups, but declined. [facility] and spoke on the phone for awhile. Walks slow and steady in [place]. Pt ate about 25% of her supper entree; bedtime had peanut butter , pat crackers and juice. Pt admits to suicidal thoughts, misses her kids. Pt was reminded to not eat or drink after midnight due to morning ECT.

## 2020-04-01 NOTE — ANESTHESIA POSTPROCEDURE EVALUATION
Patient: Misty Parham    * No procedures listed *    Diagnosis:* No pre-op diagnosis entered *  Diagnosis Additional Information: No value filed.    Anesthesia Type:  General    Note:  Anesthesia Post Evaluation    Patient location during evaluation: Bedside  Patient participation: Able to fully participate in evaluation  Level of consciousness: awake and alert  Pain management: adequate  Airway patency: patent  Cardiovascular status: acceptable  Respiratory status: acceptable  Hydration status: acceptable  PONV: none             Last vitals:  Vitals:    04/01/20 0705 04/01/20 0710 04/01/20 0715   BP: 110/71 113/61    Pulse: 89 89    Resp: 13 12 11   Temp:      SpO2: 94% 93%          Electronically Signed By: Pedro Finley MD  April 1, 2020  7:24 AM

## 2020-04-01 NOTE — PROCEDURES
"[facility] ECT Procedure Note     Misty Parham [id number]   34 year old 1985     Patient Status: Inpatient    No Known Allergies    Weight:  228 lbs 14.4 oz    Patient Preparations: Glasses/Contacts removed         Diagnosis:   Major depression     Medications ineffective, Deteriorating fluid/electrolyte/nutritional status, History of good ECT response in one or more previous episodes of illness and currently receiving maintenance  ECT per lucas truong order 2X per week for duration of commitment. She reports improvement in her mood since starting lithium, slight \"dizzines which she thinks is from tx.       Pause for the Cause:     Right patient Yes   Right procedure/laterality settings: Yes   Right diagnosis Yes          Intra-Procedure Documentation:     Date:  4/1/2020  Time:  6:43AM    ECT #    Treatment number this series: 11   Total treatment number: 11   Type of ECT:  Bilateral, standard    ECT Medications administered: Brevital: 100mg  Succinyl Choline: 100mg         Clinical Narrative:     ECT was administered by Thymatron machine.  Pt has been medically cleared for procedure, consent not needed as ECT tx is being done under court order. Side effects, Risks and benefits reviewed. Misty is IP, receiving ECT under price truong order, 2 ECT tx per week for duration of committment M/W schedule until price-truong is amended for more frequent treatment-we are still waiting on this    ECT Strip Summary:     Energy Level: 100 percent  Motor Seizure Duration:34  seconds  EEG Seizure Duration: 34 seconds    Complications: none  Plan: 12th maint  ECT 4/6/2020 per price truong order for duration of committment. Lithium initiated for augmentation    Doug Devi MD        "

## 2020-04-01 NOTE — PROGRESS NOTES
"[facility]  Psychiatric Progress Note    Length of stay (days): 40        Interim History:   The patient's care was discussed with the treatment team during the daily team meeting and/or staff's chart notes were reviewed.  Staff report: No acute issues overnight.  She has only been eating breakfast and skipping the remainder of her meals.  Reporting suicidal ideation at times.    Depression severity scale 0-10 (10=most severe):  Today: 8    Mood is worse today which she attributes to new psychosocial stressors involving her son and an unexpected change in his living environment.  The instability of her family's living environment seems to be an ongoing trigger for her.  She is not certain where her  is residing at the moment and may be homeless.  She would ideally like her family to reunite under one household however financial hardships remain a barrier.    Energy is low, appetite is low, concentration is poor.    Suicidal thoughts are present however she contracts for safety on the unit.  No homicidal thoughts reported.    Tolerating medications without side effects.    She is agreeable to pursuing residential treatment however does not feel ready enough to transition into that treatment mode yet.           Medications:       brexpiprazole  3 mg Oral Daily     lamoTRIgine  200 mg Oral Daily     lithium ER  450 mg Oral Q12H JASMINE     mirtazapine  45 mg Oral At Bedtime     ramelteon  8 mg Oral At Bedtime     venlafaxine  150 mg Oral Daily with breakfast          Allergies:   No Known Allergies       Labs:     No results found for this or any previous visit (from the past 24 hour(s)).       Psychiatric Examination:     /75   Pulse 96   Temp 98.4  F (36.9  C) (Oral)   Resp 16   Ht 1.651 m (5' 5\")   Wt 103.8 kg (228 lb 14.4 oz)   SpO2 98%   BMI 38.09 kg/m    Weight is 228 lbs 14.4 oz  Body mass index is 38.09 kg/m .  Orthostatic Vitals     None            Appearance: awake, " alert  Attitude:  cooperative  Eye Contact:  fair  Mood:  depressed  Affect:  intensity is blunted  Speech:  decreased prosody  Psychomotor Behavior:  no evidence of tardive dyskinesia, dystonia, or tics  Throught Process:  linear  Associations:  no loose associations  Thought Content:  no evidence of psychotic thought and active suicidal ideation present  Insight:  partial  Judgement:  fair  Oriented to:  time, person, and place  Attention Span and Concentration:  fair  Recent and Remote Memory:  intact    Clinical Global Impressions  First:     Most recent:            Precautions:     Behavioral Orders   Procedures     Code 1 - Restrict to Unit     Code 2 - 1:1 Staff Supervision     For ECT only     Code 2 - 1:1 Staff Supervision     For ECT only     Electroconvulsive therapy     Series of up to 12 treatments. Begin Date: 2/26/20   Treating Psychiatrist providing ECT: Doug Devi MD   Notified on: 2/25/20     Electroconvulsive therapy     Series of up to 12 treatments. Begin Date: 2/26/20   Treating Psychiatrist providing ECT: Doug Devi MD   Notified on: 2/25/20     Electroconvulsive therapy     Series of up to 12 treatments. Begin Date: 2/26/20   Treating Psychiatrist providing ECT: Doug Devi MD   Notified on: 2/25/20     Electroconvulsive therapy     Series of up to 12 treatments. Begin Date: 2/26/20   Treating Psychiatrist providing ECT: Doug Devi MD   Notified on: 2/25/20     Electroconvulsive therapy     Series of up to 12 treatments. Begin Date: 2/26/20   Treating Psychiatrist providing ECT: Doug Devi MD   Notified on: 2/25/20     Electroconvulsive therapy     Series of up to 12 treatments. Begin Date: 2/26/20   Treating Psychiatrist providing ECT: Doug Devi MD   Notified on: 2/25/20     Electroconvulsive therapy     Series of up to 12 treatments. Begin Date: 2/26/20   Treating Psychiatrist providing ECT: Doug Ceballos  MD Marito   Notified on: 2/25/20     Electroconvulsive therapy     Series of up to 12 treatments. Begin Date: 2/26/20   Treating Psychiatrist providing ECT: Doug Devi MD   Notified on: 2/25/20     Fall precautions     Fall precautions     Routine Programming     As clinically indicated     Status 15     Every 15 minutes.          DIagnoses:     Major depressive disorder-recurrent, severe  Generalized anxiety disorder  Unspecified personality disorder         Plan:     Plan to optimize Effexor targeting symptoms of her mood and anxiety disorder.  Continue augmentation with mirtazapine.  Continue augmentation with Rexulti.  New addition of lithium noted as an additional augmenter.  Lithium level noted to be 0.95 on March 25.  Plan to check another serum level later this week to ensure stability.    Treatment with ECT has been resumed while under a Travis Andersen.  Treatment will be continued twice a week, Monday and Wednesday, as we await the court's decision regarding my petition to utilize ECT 3 times a week.    Psychosocial treatments to be addressed with social work consult and groups.  Consider neuropsychological testing to explore underlying personality characteristics that could be complicating her treatment course.    Legal Status: full commitment with Travis Andersen    Disposition: Explore intensive residential treatment services once the patient has the appropriate funding source for this treatment mode.

## 2020-04-01 NOTE — ANESTHESIA CARE TRANSFER NOTE
Patient: Misty Parham    * No procedures listed *    Diagnosis: * No pre-op diagnosis entered *  Diagnosis Additional Information: No value filed.    Anesthesia Type:   General     Note:  Airway :Nasal Cannula  Patient transferred to:PACU  Comments: Native airway general anesthetic.  Patient hyperventilated with 100% oxygen via mask prior to treatment.   Anesthesia induced using patent peripheral IV.    Bite block placed between molars to protect teeth and tongue.     After induction of seizure patient mask ventilated with 100% oxygen until spontaneous respirations returned.     At time of handoff to PACU, patient exhibited spontaneous respirations, adequate tidal volumes, airway patent. Oxygen via nasal cannula at 2 liters per minute to PACU in cart with siderails up, connected to wall O2 in PACU. All monitors and alarms on and functioning. Report given to PACU RN and questions answered.   Handoff Report: Identifed the Patient, Identified the Reponsible Provider, Reviewed the pertinent medical history, Discussed the surgical course, Reviewed Intra-OP anesthesia mangement and issues during anesthesia, Set expectations for post-procedure period and Allowed opportunity for questions and acknowledgement of understanding      Vitals: (Last set prior to Anesthesia Care Transfer)    CRNA VITALS  4/1/2020 0620 - 4/1/2020 0650      4/1/2020             Pulse:  76    SpO2:  96 %    Resp Rate (observed):  12    Resp Rate (set):  10                Electronically Signed By: WILLIAN Ramos CRNA  April 1, 2020  6:50 AM

## 2020-04-02 PROCEDURE — 25000132 ZZH RX MED GY IP 250 OP 250 PS 637: Performed by: PSYCHIATRY & NEUROLOGY

## 2020-04-02 PROCEDURE — 12400000 ZZH R&B MH

## 2020-04-02 PROCEDURE — 99232 SBSQ HOSP IP/OBS MODERATE 35: CPT | Performed by: PSYCHIATRY & NEUROLOGY

## 2020-04-02 RX ADMIN — LITHIUM CARBONATE 450 MG: 450 TABLET, EXTENDED RELEASE ORAL at 08:28

## 2020-04-02 RX ADMIN — BREXPIPRAZOLE 3 MG: 1 TABLET ORAL at 08:27

## 2020-04-02 RX ADMIN — MIRTAZAPINE 45 MG: 45 TABLET, FILM COATED ORAL at 20:49

## 2020-04-02 RX ADMIN — LITHIUM CARBONATE 450 MG: 450 TABLET, EXTENDED RELEASE ORAL at 20:49

## 2020-04-02 RX ADMIN — HYDROXYZINE HYDROCHLORIDE 50 MG: 25 TABLET, FILM COATED ORAL at 08:32

## 2020-04-02 RX ADMIN — LAMOTRIGINE 200 MG: 100 TABLET ORAL at 08:27

## 2020-04-02 RX ADMIN — RAMELTEON 8 MG: 8 TABLET ORAL at 20:49

## 2020-04-02 RX ADMIN — VENLAFAXINE HYDROCHLORIDE 225 MG: 150 CAPSULE, EXTENDED RELEASE ORAL at 08:27

## 2020-04-02 ASSESSMENT — MIFFLIN-ST. JEOR: SCORE: 1728.71

## 2020-04-02 NOTE — PLAN OF CARE
"With initial task set up and direct VCs, pt participated in therapeutic group activity and discussion addressing self-concept. Educated pt on definition of self concept and benefits of positive/consequences of negative self-confidence. Pt ID'd things she is good at (e.g. \"walking away\" when needed), positive attributes of self, achievements (e.g. raising her kids), and things that make her happy (e.g. her kids). Educated pt on strategies to improve self concept with pt quiet but attentive. Pt will continue to benefit from OT intervention to address implementation of positive functional coping skills, role performance, and community reintegration.     "

## 2020-04-02 NOTE — PROGRESS NOTES
received a call this afternoon from court examiner, Dr. Ez You, who wanted to discuss setting up the conference call for patient's [employer] hearing, which is tomorrow at 1300.  discussed that [facility] still does not have video capability, so it will be done via conference call. Patient will be put in the kitchen conference room accompanied by staff. The call will originate here with all the parties connected via conference call. Here are the names and numbers of the parties: Dr Ez You (court examiner) [phone]. Darek Robledo (patient's ) [phone], and Robbie De La Torre ( representing the hospital) [phone].

## 2020-04-02 NOTE — PLAN OF CARE
"Shift Update: Pt presents with a blunted affect and a depressed mood. Pt stated that her mood has improved after speaking with family on the phone. Pt complained of light headedness, but after drinking more water and eating, she stated that she felt better.  Pt ate most of her meal, and ate snacks.  Pt reports having memory issues after her ECT. Insight is fair, as is judgment. Pt states that she has chronic thoughts of SI, with most recent plan to \"cut my head off\". Pt contracts for safety while at hospital, and states SI thoughts are not a strong after ECT. Pt stated that she is anxious about court tomorrow.  Pt attended wrap up group, and was present in the milieu for much of the shift.    "

## 2020-04-02 NOTE — PLAN OF CARE
"  Problem: Adult Behavioral Health Plan of Care  Goal: Patient-Specific Goal (Individualization)  Description: 1. Absence of suicidal ideation with safety plan in place.  2. Effective medication regime with patient compliance.  3. Stabilization of mood and thought processes.  4. Improved insight into mental health issues.  5. Able to identify and utilize positive coping skills.  6. Plan in place for ongoing treatment and support.     Outcome: No Change  Note: Pt has been feeling up and down and expressed feeling more depressed> pt stated this morning she woke up hearing voices telling her to die. Pt stated she has not heard voices before and that this is the first day. Pt states this has been bothering her this morning. Pt was asked if there were any stressors going on at home., Pt states that her kids are having trouble with family and that her younger son left her sisters place and was asked not to return. Pt's one is with another family member at the moment. Pt states she doesn't know where her  is at the moment and he is not involved with watching them. Pt appears anxious and preoccupied with staff and has poor eye contact. Pt states she wants to \"die\" and states her plan is to not eat. Pt though does state if she feels like acting on her thoughts she would come to staff. Pt was able to contract for safety. Pt's affect is flat and her affect is blunt. Pt continues to talk about feeling depressed but machado have moments or feeling better. Nursing to continue to monitor.      "

## 2020-04-02 NOTE — PROGRESS NOTES
"[facility]  Psychiatric Progress Note    Length of stay (days): 41        Interim History:   The patient's care was discussed with the treatment team during the daily team meeting and/or staff's chart notes were reviewed.  Staff report: No acute issues overnight.  She has only been eating breakfast and skipping the remainder of her meals.  Reporting suicidal ideation at times.    Depression severity scale 0-10 (10=most severe):  Today: 8    Her mood is slightly better today however severe depressive symptoms continue.  Her primary stressor remains unchanged (disintegration of her family and inability to reside together in one household).    Energy is low, appetite is low, concentration is poor.  She continues to minimize food intake however has been eating the majority of her breakfast and some fruit throughout the day.    Suicidal thoughts are present however she contracts for safety on the unit.  No homicidal thoughts reported.    Today, she reports experiencing auditory hallucinations, making derogatory comments and influencing suicidal ideation.  She has intermittently experienced this symptom in the past, exclusively during severe depressive episodes.    Tolerating medications without side effects.    She is agreeable to pursuing residential treatment however does not feel ready enough to transition into that treatment mode yet.           Medications:       brexpiprazole  3 mg Oral Daily     lamoTRIgine  200 mg Oral Daily     lithium ER  450 mg Oral Q12H JASMINE     mirtazapine  45 mg Oral At Bedtime     ramelteon  8 mg Oral At Bedtime     venlafaxine  225 mg Oral Daily with breakfast          Allergies:   No Known Allergies       Labs:     No results found for this or any previous visit (from the past 24 hour(s)).       Psychiatric Examination:     /84   Pulse 87   Temp 98.3  F (36.8  C) (Oral)   Resp 16   Ht 1.651 m (5' 5\")   Wt 103.8 kg (228 lb 12.8 oz)   SpO2 98%   BMI 38.07 kg/m  "   Weight is 228 lbs 12.8 oz  Body mass index is 38.07 kg/m .  Orthostatic Vitals     None            Appearance: awake, alert  Attitude:  cooperative  Eye Contact:  fair  Mood:  depressed  Affect:  intensity is blunted  Speech:  decreased prosody  Psychomotor Behavior:  no evidence of tardive dyskinesia, dystonia, or tics  Throught Process:  linear  Associations:  no loose associations  Thought Content:  no evidence of psychotic thought and active suicidal ideation present  Insight:  partial  Judgement:  fair  Oriented to:  time, person, and place  Attention Span and Concentration:  fair  Recent and Remote Memory:  intact    Clinical Global Impressions  First:     Most recent:            Precautions:     Behavioral Orders   Procedures     Code 1 - Restrict to Unit     Code 2 - 1:1 Staff Supervision     For ECT only     Code 2 - 1:1 Staff Supervision     For ECT only     Electroconvulsive therapy     Series of up to 12 treatments. Begin Date: 2/26/20   Treating Psychiatrist providing ECT: Doug Dvei MD   Notified on: 2/25/20     Electroconvulsive therapy     Series of up to 12 treatments. Begin Date: 2/26/20   Treating Psychiatrist providing ECT: Doug Devi MD   Notified on: 2/25/20     Electroconvulsive therapy     Series of up to 12 treatments. Begin Date: 2/26/20   Treating Psychiatrist providing ECT: Doug Devi MD   Notified on: 2/25/20     Electroconvulsive therapy     Series of up to 12 treatments. Begin Date: 2/26/20   Treating Psychiatrist providing ECT: Doug Devi MD   Notified on: 2/25/20     Electroconvulsive therapy     Series of up to 12 treatments. Begin Date: 2/26/20   Treating Psychiatrist providing ECT: Doug Devi MD   Notified on: 2/25/20     Electroconvulsive therapy     Series of up to 12 treatments. Begin Date: 2/26/20   Treating Psychiatrist providing ECT: Doug Devi MD   Notified on: 2/25/20      Electroconvulsive therapy     Series of up to 12 treatments. Begin Date: 2/26/20   Treating Psychiatrist providing ECT: Doug Devi MD   Notified on: 2/25/20     Electroconvulsive therapy     Series of up to 12 treatments. Begin Date: 2/26/20   Treating Psychiatrist providing ECT: Doug Devi MD   Notified on: 2/25/20     Fall precautions     Fall precautions     Routine Programming     As clinically indicated     Status 15     Every 15 minutes.          DIagnoses:     Major depressive disorder-recurrent, severe with psychotic features  Generalized anxiety disorder  Unspecified personality disorder         Plan:     The higher dose of Effexor was initiated this morning.  Tolerating well so far.  Continue to monitor response as we aim to reduce depressive symptoms.  Continue augmentation with mirtazapine.  Increase Rexulti targeting psychotic symptoms which may be related to her depressive disorder.  Continue lithium for additional augmentation while planning to check a serum level Friday morning.    Treatment with ECT has been resumed while under a [employer] [facility]en.  Treatment will be continued twice a week, Monday and Wednesday, as we await the court's decision regarding my petition to utilize ECT 3 times a week.    Psychosocial treatments to be addressed with social work consult and groups.  Consider neuropsychological testing to explore underlying personality characteristics that could be complicating her treatment course.    Legal Status: full commitment with [employer]les Andersen    Disposition: Explore intensive residential treatment services once the patient has the appropriate funding source for this treatment mode.

## 2020-04-03 PROCEDURE — 99232 SBSQ HOSP IP/OBS MODERATE 35: CPT | Performed by: PSYCHIATRY & NEUROLOGY

## 2020-04-03 PROCEDURE — 12400000 ZZH R&B MH

## 2020-04-03 PROCEDURE — 25000132 ZZH RX MED GY IP 250 OP 250 PS 637: Performed by: PSYCHIATRY & NEUROLOGY

## 2020-04-03 PROCEDURE — G0177 OPPS/PHP; TRAIN & EDUC SERV: HCPCS

## 2020-04-03 RX ADMIN — VENLAFAXINE HYDROCHLORIDE 225 MG: 150 CAPSULE, EXTENDED RELEASE ORAL at 08:37

## 2020-04-03 RX ADMIN — BREXPIPRAZOLE 4 MG: 1 TABLET ORAL at 08:36

## 2020-04-03 RX ADMIN — RAMELTEON 8 MG: 8 TABLET ORAL at 21:16

## 2020-04-03 RX ADMIN — LAMOTRIGINE 200 MG: 100 TABLET ORAL at 08:37

## 2020-04-03 RX ADMIN — LITHIUM CARBONATE 450 MG: 450 TABLET, EXTENDED RELEASE ORAL at 08:37

## 2020-04-03 RX ADMIN — LITHIUM CARBONATE 450 MG: 450 TABLET, EXTENDED RELEASE ORAL at 21:16

## 2020-04-03 RX ADMIN — MIRTAZAPINE 45 MG: 45 TABLET, FILM COATED ORAL at 21:15

## 2020-04-03 ASSESSMENT — ACTIVITIES OF DAILY LIVING (ADL)
ORAL_HYGIENE: INDEPENDENT
DRESS: INDEPENDENT
DRESS: INDEPENDENT
HYGIENE/GROOMING: INDEPENDENT
ORAL_HYGIENE: INDEPENDENT
LAUNDRY: WITH SUPERVISION
LAUNDRY: WITH SUPERVISION
HYGIENE/GROOMING: INDEPENDENT

## 2020-04-03 NOTE — PROGRESS NOTES
Phone conference set up today at 1300 for patient's [employer] hearing. Court examiner, Dr. Ez You ([phone]), patient's , Darek Robledo ([phone]) and assistant [facility] attorney representing the hospital, Mary Lou Wood ([phone]) were all on the conference call which was put on speaker for patient who was in the [facility] kitchen conference room accompanied by unit staff.

## 2020-04-03 NOTE — PLAN OF CARE
Problem: Depressive Symptoms  Goal: Depressive Symptoms  Description: Signs and symptoms of listed problems will be absent or manageable.  Suicidal ideation,   Depression.   Outcome: Improving  Flowsheets (Taken 4/3/2020 3252)  Depressive Symptoms Assessed: all  Depressive Symptoms Present:   suicidality   thought process   anxiety   insight   memory deficits  Note: Pt presents with anxious mood and sadness regarding her court that took place at 1300. Pt had a conference call hearing for price truong in the conference room with staff assistance. Pt is worried about they way the decision may come down to be. Affect is blunt, poor insight and thought process. Judgment is impaired, pt continues to endorse SI chronic thoughts although she contracts for safety in the hospital. Pt attended groups although just passive. Pt express worries about how many ECT she got left and the schedules.

## 2020-04-03 NOTE — PLAN OF CARE
"Shift Update: Pt presents with a flat affect and a depressed mood. Thought process is is impaired, as is judgement. Insight is fair Pt endorses thoughts of suicidal ideation with a plan to \"kill myself anyway I could\". Pt contracts for safety while in hospital. Pt stated that she is having auditory hallucination with commands to \"kill myself\".  Pt states that she is feeling more depressed and anxious. Pt states that she is not able to rest, as she wakes up frequently. Pt stated that she was tired during shift. During 1:1 and during shift, pt was very slow to respond and would search for words to questions. When asked, pt stated that her memory has been bad and she is having a hard time remembering things and concentrating. Pt stated that she has not been able to read her books, as she cannot concentrate. Pt ordered very little on her tray, but when asked if she would like any of the choices other pts had, pt chose an entree and with prompting, ate all of food ordered. Pt needed to be prompted to drink fluids. Pt attended unit programming, but spoke only when spoken to.   Court Friday   "

## 2020-04-03 NOTE — PLAN OF CARE
"Assessed pt cognition with MoCA per MD request with pt scoring 9/30 indicating significantly impaired cognition. Pt demo'd deficits in executive function, short term memory, working memory, abstraction, attention, and visuospatial skills. Pt displayed deficits in all areas except naming. Sample responses include:              -Pt reports the date as February 3, 2015.             -When asked to subtract 7 from 100, pt stated \"so it's just 1 minus 700?\" and eventually gave her answer as 700.             -When asked to name as many words as she could that begin with the letter \"F,\" pt was only able to ID three words in 60   seconds (\"from,\" \"for,\" \"front\").     Pt reports she has noticed difficulty with attention and memory since being in the hospital. When asked how she thought the assessment went, pt simply said \"some questions were okay,\" and did not elaborate despite prompting. Will continue to assess bi-weekly as requested by MD.     Pt attended 2 of 2 OT groups today. Pt transitioned to OT group with MIN encouragement and with direct VCs and extended time, engaged in therapeutic group activity and discussion addressing weekend planning. Educated pt on four categories of activity (self-care, leisure, productivity, and social) and the importance of balance between categories for wellness. With MOD-MAX A, pt ID'd activities she could complete this weekend in each of the four categories of activity (e.g. wash hair, spend time in the lounge, \"write notes\"). Additionally, pt participated in a mindfulness group focusing on reduction of anxiety, improvement of mood, and increase in concentration. The mindfulness practice was offered via supportive approaches including seated body scan and mindful listening. Pt will continue to benefit from OT intervention to address implementation of positive functional coping skills, role performance, and community reintegration.       "

## 2020-04-04 LAB — LITHIUM SERPL-SCNC: 0.85 MMOL/L (ref 0.6–1.2)

## 2020-04-04 PROCEDURE — 36415 COLL VENOUS BLD VENIPUNCTURE: CPT | Performed by: PSYCHIATRY & NEUROLOGY

## 2020-04-04 PROCEDURE — 12400000 ZZH R&B MH

## 2020-04-04 PROCEDURE — 25000132 ZZH RX MED GY IP 250 OP 250 PS 637: Performed by: PSYCHIATRY & NEUROLOGY

## 2020-04-04 PROCEDURE — 80178 ASSAY OF LITHIUM: CPT | Performed by: PSYCHIATRY & NEUROLOGY

## 2020-04-04 RX ADMIN — RAMELTEON 8 MG: 8 TABLET ORAL at 21:16

## 2020-04-04 RX ADMIN — VENLAFAXINE HYDROCHLORIDE 225 MG: 150 CAPSULE, EXTENDED RELEASE ORAL at 08:57

## 2020-04-04 RX ADMIN — LAMOTRIGINE 200 MG: 100 TABLET ORAL at 08:56

## 2020-04-04 RX ADMIN — BREXPIPRAZOLE 4 MG: 1 TABLET ORAL at 08:56

## 2020-04-04 RX ADMIN — LITHIUM CARBONATE 450 MG: 450 TABLET, EXTENDED RELEASE ORAL at 21:15

## 2020-04-04 RX ADMIN — MIRTAZAPINE 45 MG: 45 TABLET, FILM COATED ORAL at 21:16

## 2020-04-04 RX ADMIN — LITHIUM CARBONATE 450 MG: 450 TABLET, EXTENDED RELEASE ORAL at 08:57

## 2020-04-04 ASSESSMENT — ACTIVITIES OF DAILY LIVING (ADL)
DRESS: SCRUBS (BEHAVIORAL HEALTH)
HYGIENE/GROOMING: INDEPENDENT
DRESS: SCRUBS (BEHAVIORAL HEALTH)
LAUNDRY: WITH SUPERVISION
HYGIENE/GROOMING: INDEPENDENT
ORAL_HYGIENE: PROMPTS
ORAL_HYGIENE: INDEPENDENT

## 2020-04-04 NOTE — PLAN OF CARE
"Pt presents with sad, depressed mood and flat affect. Pt spoke very minimally and is delayed when responding to direct questions; seemed confused at times about what was being said to her. Pt did ask staff about papers regarding today's hearing and was confused about what they meant. Pt states she is just \"going with it\" and doesn't seem to have much insight regarding situation. Pt states she feels ECT affects her memory. Thought process is impaired. Pt did eat her entire dinner and sat in the lounge for much of shift. Pt endorses SI but contracts for safety. Pt passively attended groups. Med compliant.  "

## 2020-04-04 NOTE — PROGRESS NOTES
"[facility]  Psychiatric Progress Note    Length of stay (days): 42        Interim History:   The patient's care was discussed with the treatment team during the daily team meeting and/or staff's chart notes were reviewed.  Staff report: No acute issues overnight.  She has only been eating breakfast and skipping the remainder of her meals.  Reporting suicidal ideation at times.    Depression severity scale 0-10 (10=most severe):  Today: 8    Her mood is slightly better today however severe depressive symptoms continue.  Her primary stressor remains unchanged (disintegration of her family and inability to reside together in one household).    Energy is low, appetite is low, concentration is poor.  She continues to minimize food intake however has been eating the majority of her breakfast and some fruit throughout the day.    Suicidal thoughts are present however she contracts for safety on the unit.  No homicidal thoughts reported.    Today, she reports experiencing auditory hallucinations, making derogatory comments and influencing suicidal ideation.  She has intermittently experienced this symptom in the past, exclusively during severe depressive episodes.    Tolerating medications without side effects.    She is agreeable to pursuing residential treatment however does not feel ready enough to transition into that treatment mode yet.           Medications:       brexpiprazole  4 mg Oral Daily     lamoTRIgine  200 mg Oral Daily     lithium ER  450 mg Oral Q12H JASMINE     mirtazapine  45 mg Oral At Bedtime     ramelteon  8 mg Oral At Bedtime     venlafaxine  225 mg Oral Daily with breakfast          Allergies:   No Known Allergies       Labs:     No results found for this or any previous visit (from the past 24 hour(s)).       Psychiatric Examination:     /73   Pulse 96   Temp 98.8  F (37.1  C) (Oral)   Resp 16   Ht 1.651 m (5' 5\")   Wt 103.8 kg (228 lb 12.8 oz)   SpO2 96%   BMI 38.07 kg/m  "   Weight is 228 lbs 12.8 oz  Body mass index is 38.07 kg/m .  Orthostatic Vitals     None            Appearance: awake, alert  Attitude:  cooperative  Eye Contact:  fair  Mood:  depressed  Affect:  intensity is blunted  Speech:  decreased prosody  Psychomotor Behavior:  no evidence of tardive dyskinesia, dystonia, or tics  Throught Process:  linear  Associations:  no loose associations  Thought Content:  no evidence of psychotic thought and active suicidal ideation present  Insight:  partial  Judgement:  fair  Oriented to:  time, person, and place  Attention Span and Concentration:  fair  Recent and Remote Memory:  intact    Clinical Global Impressions  First:     Most recent:            Precautions:     Behavioral Orders   Procedures     Code 1 - Restrict to Unit     Code 2 - 1:1 Staff Supervision     For ECT only     Code 2 - 1:1 Staff Supervision     For ECT only     Electroconvulsive therapy     Series of up to 12 treatments. Begin Date: 2/26/20   Treating Psychiatrist providing ECT: Doug Devi MD   Notified on: 2/25/20     Electroconvulsive therapy     Series of up to 12 treatments. Begin Date: 2/26/20   Treating Psychiatrist providing ECT: Doug Devi MD   Notified on: 2/25/20     Electroconvulsive therapy     Series of up to 12 treatments. Begin Date: 2/26/20   Treating Psychiatrist providing ECT: Doug Devi MD   Notified on: 2/25/20     Electroconvulsive therapy     Series of up to 12 treatments. Begin Date: 2/26/20   Treating Psychiatrist providing ECT: Doug Devi MD   Notified on: 2/25/20     Electroconvulsive therapy     Series of up to 12 treatments. Begin Date: 2/26/20   Treating Psychiatrist providing ECT: Doug Devi MD   Notified on: 2/25/20     Electroconvulsive therapy     Series of up to 12 treatments. Begin Date: 2/26/20   Treating Psychiatrist providing ECT: Doug Devi MD   Notified on: 2/25/20      Electroconvulsive therapy     Series of up to 12 treatments. Begin Date: 2/26/20   Treating Psychiatrist providing ECT: Doug Devi MD   Notified on: 2/25/20     Electroconvulsive therapy     Series of up to 12 treatments. Begin Date: 2/26/20   Treating Psychiatrist providing ECT: Doug Devi MD   Notified on: 2/25/20     Fall precautions     Fall precautions     Routine Programming     As clinically indicated     Status 15     Every 15 minutes.          DIagnoses:     Major depressive disorder-recurrent, severe with psychotic features  Generalized anxiety disorder  Unspecified personality disorder         Plan:     Continue the higher dose of Effexor. Tolerating well so far.  Continue to monitor response as we aim to reduce depressive symptoms.  Continue augmentation with mirtazapine.  Increase Rexulti targeting psychotic symptoms which may be related to her depressive disorder.  Continue lithium for additional augmentation while planning to check a serum level Saturday morning.    Treatment with ECT has been resumed while under a [facility]les Andersen.  Treatment will be continued twice a week, Monday and Wednesday, as we await the court's decision regarding my petition to utilize ECT 3 times a week.    Psychosocial treatments to be addressed with social work consult and groups.  Consider neuropsychological testing to explore underlying personality characteristics that could be complicating her treatment course.    Legal Status: full commitment with [facility]    Disposition: Explore intensive residential treatment services once the patient has the appropriate funding source for this treatment mode.

## 2020-04-04 NOTE — PLAN OF CARE
Shift Update: Pt mood is flat/blunt. Thought process is intact. Insight is poor. Pt has chronic  suicidal ideation with no plan. Pt states she is more anxious today and is worried about her 12 yo. Pt did talk to him this shift. Also patient encouraged to walk the hallway with this writer and then took a shower. Pt needs  much encouragement to get out of her room. She ate breakfast and drank her boost but only ate grapes for lunch.

## 2020-04-05 PROCEDURE — 25000132 ZZH RX MED GY IP 250 OP 250 PS 637: Performed by: PSYCHIATRY & NEUROLOGY

## 2020-04-05 PROCEDURE — 12400000 ZZH R&B MH

## 2020-04-05 RX ADMIN — LITHIUM CARBONATE 450 MG: 450 TABLET, EXTENDED RELEASE ORAL at 08:19

## 2020-04-05 RX ADMIN — LAMOTRIGINE 200 MG: 100 TABLET ORAL at 08:19

## 2020-04-05 RX ADMIN — RAMELTEON 8 MG: 8 TABLET ORAL at 20:25

## 2020-04-05 RX ADMIN — BREXPIPRAZOLE 4 MG: 1 TABLET ORAL at 08:19

## 2020-04-05 RX ADMIN — VENLAFAXINE HYDROCHLORIDE 225 MG: 150 CAPSULE, EXTENDED RELEASE ORAL at 08:19

## 2020-04-05 RX ADMIN — MIRTAZAPINE 45 MG: 45 TABLET, FILM COATED ORAL at 20:25

## 2020-04-05 RX ADMIN — LITHIUM CARBONATE 450 MG: 450 TABLET, EXTENDED RELEASE ORAL at 20:25

## 2020-04-05 ASSESSMENT — MIFFLIN-ST. JEOR: SCORE: 1722.81

## 2020-04-05 ASSESSMENT — ACTIVITIES OF DAILY LIVING (ADL)
DRESS: SCRUBS (BEHAVIORAL HEALTH)
LAUNDRY: WITH SUPERVISION
LAUNDRY: WITH SUPERVISION
ORAL_HYGIENE: INDEPENDENT
DRESS: SCRUBS (BEHAVIORAL HEALTH)
ORAL_HYGIENE: INDEPENDENT
HYGIENE/GROOMING: INDEPENDENT
HYGIENE/GROOMING: INDEPENDENT

## 2020-04-05 NOTE — PLAN OF CARE
"patient spent much of the shift in her room. She ate breakfast (Vincentian toast, fruit cup,muffin, OJ) and a little bit for lunch (fruit cup and hot chocolate).  States she is feeling anxious today because she is to have ECT again tomorrow. Tho she does state that ECT helps \"a bit\". States that her depression is worse than her anxiety today and still endorses plan to SI by starvation.  Mood is depressed, anxious. Affect is flat and blunt but brightens on approach at times.   "

## 2020-04-05 NOTE — PLAN OF CARE
Pt spoke about being worried about her kids; states spoke to older son on the phone. Up to lounge for supper; is maintaining her weight. Pt is very soft-spoken, almost a whisper. Pt appears sad, depressed. Pt verbally contracted again for safety here, but admits to continuous suicidal wish. Pt appears confused at times and needs to be directed.

## 2020-04-06 ENCOUNTER — ANESTHESIA EVENT (OUTPATIENT)
Dept: SURGERY | Facility: CLINIC | Age: 35
End: 2020-04-06

## 2020-04-06 ENCOUNTER — ANESTHESIA (OUTPATIENT)
Dept: SURGERY | Facility: CLINIC | Age: 35
End: 2020-04-06

## 2020-04-06 ENCOUNTER — APPOINTMENT (OUTPATIENT)
Dept: SURGERY | Facility: CLINIC | Age: 35
End: 2020-04-06
Payer: COMMERCIAL

## 2020-04-06 PROCEDURE — 25000132 ZZH RX MED GY IP 250 OP 250 PS 637: Performed by: PSYCHIATRY & NEUROLOGY

## 2020-04-06 PROCEDURE — 99232 SBSQ HOSP IP/OBS MODERATE 35: CPT | Mod: 25 | Performed by: PSYCHIATRY & NEUROLOGY

## 2020-04-06 PROCEDURE — 90870 ELECTROCONVULSIVE THERAPY: CPT

## 2020-04-06 PROCEDURE — 25800030 ZZH RX IP 258 OP 636: Performed by: ANESTHESIOLOGY

## 2020-04-06 PROCEDURE — 25000125 ZZHC RX 250: Performed by: NURSE ANESTHETIST, CERTIFIED REGISTERED

## 2020-04-06 PROCEDURE — 12400000 ZZH R&B MH

## 2020-04-06 PROCEDURE — 25000128 H RX IP 250 OP 636: Performed by: NURSE ANESTHETIST, CERTIFIED REGISTERED

## 2020-04-06 PROCEDURE — 40000671 ZZH STATISTIC ANESTHESIA CASE

## 2020-04-06 RX ORDER — NALOXONE HYDROCHLORIDE 0.4 MG/ML
.1-.4 INJECTION, SOLUTION INTRAMUSCULAR; INTRAVENOUS; SUBCUTANEOUS
Status: DISCONTINUED | OUTPATIENT
Start: 2020-04-06 | End: 2020-04-06 | Stop reason: HOSPADM

## 2020-04-06 RX ORDER — ONDANSETRON 2 MG/ML
4 INJECTION INTRAMUSCULAR; INTRAVENOUS EVERY 30 MIN PRN
Status: DISCONTINUED | OUTPATIENT
Start: 2020-04-06 | End: 2020-04-06 | Stop reason: HOSPADM

## 2020-04-06 RX ORDER — HYDRALAZINE HYDROCHLORIDE 20 MG/ML
2.5-5 INJECTION INTRAMUSCULAR; INTRAVENOUS EVERY 10 MIN PRN
Status: DISCONTINUED | OUTPATIENT
Start: 2020-04-06 | End: 2020-04-06 | Stop reason: HOSPADM

## 2020-04-06 RX ORDER — HYDROMORPHONE HYDROCHLORIDE 1 MG/ML
.3-.5 INJECTION, SOLUTION INTRAMUSCULAR; INTRAVENOUS; SUBCUTANEOUS EVERY 10 MIN PRN
Status: DISCONTINUED | OUTPATIENT
Start: 2020-04-06 | End: 2020-04-06 | Stop reason: HOSPADM

## 2020-04-06 RX ORDER — ALBUTEROL SULFATE 0.83 MG/ML
2.5 SOLUTION RESPIRATORY (INHALATION) EVERY 4 HOURS PRN
Status: DISCONTINUED | OUTPATIENT
Start: 2020-04-06 | End: 2020-04-06 | Stop reason: HOSPADM

## 2020-04-06 RX ORDER — SODIUM CHLORIDE, SODIUM LACTATE, POTASSIUM CHLORIDE, CALCIUM CHLORIDE 600; 310; 30; 20 MG/100ML; MG/100ML; MG/100ML; MG/100ML
INJECTION, SOLUTION INTRAVENOUS CONTINUOUS
Status: DISCONTINUED | OUTPATIENT
Start: 2020-04-06 | End: 2020-04-06 | Stop reason: HOSPADM

## 2020-04-06 RX ORDER — ONDANSETRON 4 MG/1
4 TABLET, ORALLY DISINTEGRATING ORAL EVERY 30 MIN PRN
Status: DISCONTINUED | OUTPATIENT
Start: 2020-04-06 | End: 2020-04-06 | Stop reason: HOSPADM

## 2020-04-06 RX ADMIN — RAMELTEON 8 MG: 8 TABLET ORAL at 20:26

## 2020-04-06 RX ADMIN — SUCCINYLCHOLINE CHLORIDE 100 MG: 20 INJECTION, SOLUTION INTRAMUSCULAR; INTRAVENOUS; PARENTERAL at 06:37

## 2020-04-06 RX ADMIN — SODIUM CHLORIDE, POTASSIUM CHLORIDE, SODIUM LACTATE AND CALCIUM CHLORIDE 500 ML: 600; 310; 30; 20 INJECTION, SOLUTION INTRAVENOUS at 06:25

## 2020-04-06 RX ADMIN — BREXPIPRAZOLE 4 MG: 1 TABLET ORAL at 08:26

## 2020-04-06 RX ADMIN — LITHIUM CARBONATE 450 MG: 450 TABLET, EXTENDED RELEASE ORAL at 08:26

## 2020-04-06 RX ADMIN — METHOHEXITAL SODIUM 100 MG: 500 INJECTION, POWDER, LYOPHILIZED, FOR SOLUTION INTRAMUSCULAR; INTRAVENOUS; RECTAL at 06:37

## 2020-04-06 RX ADMIN — VENLAFAXINE HYDROCHLORIDE 225 MG: 150 CAPSULE, EXTENDED RELEASE ORAL at 08:26

## 2020-04-06 RX ADMIN — LAMOTRIGINE 200 MG: 100 TABLET ORAL at 08:25

## 2020-04-06 RX ADMIN — MIRTAZAPINE 45 MG: 45 TABLET, FILM COATED ORAL at 20:26

## 2020-04-06 RX ADMIN — LITHIUM CARBONATE 450 MG: 450 TABLET, EXTENDED RELEASE ORAL at 20:26

## 2020-04-06 ASSESSMENT — ACTIVITIES OF DAILY LIVING (ADL)
ORAL_HYGIENE: INDEPENDENT
LAUNDRY: WITH SUPERVISION
LAUNDRY: WITH SUPERVISION
HYGIENE/GROOMING: INDEPENDENT
HYGIENE/GROOMING: INDEPENDENT
DRESS: INDEPENDENT
DRESS: INDEPENDENT
ORAL_HYGIENE: INDEPENDENT

## 2020-04-06 ASSESSMENT — LIFESTYLE VARIABLES: TOBACCO_USE: 0

## 2020-04-06 NOTE — PLAN OF CARE
Problem: Depressive Symptoms  Goal: Depressive Symptoms  Description: Signs and symptoms of listed problems will be absent or manageable.  Suicidal ideation,   Depression.   Flowsheets (Taken 4/6/2020 2774)  Depressive Symptoms Assessed: all  Depressive Symptoms Present:   suicidality   thought process   anxiety   memory deficits  Note: Patient was in and out of there room through out the shift. Patient attended focus group but with little participation. Patient states that she has a headache from the ECT. Patient also acknowledges slight confusion and short term memory loss. Patient presents as anxious and demonstrates at times a tremor in her leg. Patient appears very depressed and endorses chronic suicidal thoughts with no stated plan.

## 2020-04-06 NOTE — PLAN OF CARE
BEHAVIORAL TEAM DISCUSSION    Participants: MD, RN, OT, CM  Progress: somewhat brighter, still has suicidal ideation  Anticipated length of stay: unknown  Continued Stay Criteria/Rationale: Continues with ECT, pursuing [facility] referrals  Medical/Physical: NA  Precautions:   Behavioral Orders   Procedures     Code 1 - Restrict to Unit     Code 2 - 1:1 Staff Supervision     For ECT only     Code 2 - 1:1 Staff Supervision     For ECT only     Electroconvulsive therapy     Series of up to 12 treatments. Begin Date: 2/26/20   Treating Psychiatrist providing ECT: Doug Devi MD   Notified on: 2/25/20     Electroconvulsive therapy     Series of up to 12 treatments. Begin Date: 2/26/20   Treating Psychiatrist providing ECT: Doug Devi MD   Notified on: 2/25/20     Electroconvulsive therapy     Series of up to 12 treatments. Begin Date: 2/26/20   Treating Psychiatrist providing ECT: Doug Devi MD   Notified on: 2/25/20     Electroconvulsive therapy     Series of up to 12 treatments. Begin Date: 2/26/20   Treating Psychiatrist providing ECT: Doug Devi MD   Notified on: 2/25/20     Electroconvulsive therapy     Series of up to 12 treatments. Begin Date: 2/26/20   Treating Psychiatrist providing ECT: Doug Devi MD   Notified on: 2/25/20     Electroconvulsive therapy     Series of up to 12 treatments. Begin Date: 2/26/20   Treating Psychiatrist providing ECT: Doug Devi MD   Notified on: 2/25/20     Electroconvulsive therapy     Series of up to 12 treatments. Begin Date: 2/26/20   Treating Psychiatrist providing ECT: Doug Devi MD   Notified on: 2/25/20     Electroconvulsive therapy     Series of up to 12 treatments. Begin Date: 2/26/20   Treating Psychiatrist providing ECT: Doug Devi MD   Notified on: 2/25/20     Fall precautions     Fall precautions     Routine Programming     As clinically indicated      Status 15     Every 15 minutes.     Plan: Pursuing [facility], encourage group attendance, repeat MOCA, IRTS referrals  Rationale for change in precautions or plan: NA

## 2020-04-06 NOTE — PLAN OF CARE
Pt has been in and out of her room throughout the evening. Presents a flat and sad affect; mood is calm. Pt is brighter upon approach and communication than she used to be. No groups; still isolates to room frequently. Respectful to peers and staff.

## 2020-04-06 NOTE — ANESTHESIA CARE TRANSFER NOTE
Patient: Misty Parham    * No procedures listed *    Diagnosis: * No pre-op diagnosis entered *  Diagnosis Additional Information: No value filed.    Anesthesia Type:   No value filed.     Note:  Airway :Nasal Cannula  Patient transferred to:PACU  Comments: Native airway general anesthetic.  Patient hyperventilated with 100% oxygen via mask prior to treatment.   Anesthesia induced using patent peripheral IV.    Bite block placed between molars to protect teeth and tongue.     After induction of seizure patient mask ventilated with 100% oxygen until spontaneous respirations returned.     At time of handoff to PACU, patient exhibited spontaneous respirations, adequate tidal volumes, airway patent. Oxygen via nasal cannula at 2 liters per minute to PACU in cart with siderails up, connected to wall O2 in PACU. All monitors and alarms on and functioning. Report given to PACU RN and questions answered.   Handoff Report: Identifed the Patient, Identified the Reponsible Provider, Reviewed the pertinent medical history, Discussed the surgical course, Reviewed Intra-OP anesthesia mangement and issues during anesthesia, Set expectations for post-procedure period and Allowed opportunity for questions and acknowledgement of understanding      Vitals: (Last set prior to Anesthesia Care Transfer)    CRNA VITALS  4/6/2020 0616 - 4/6/2020 0647      4/6/2020             Pulse:  90    SpO2:  96 %    Resp Rate (observed):  (!) 7    Resp Rate (set):  10                Electronically Signed By: WILLIAN Ramos CRNA  April 6, 2020  6:47 AM

## 2020-04-06 NOTE — PROGRESS NOTES
Left a voice mail for Jeramy at [facility] [phone] to inquire about referral status.  I spoke with Leena at [facility] [phone], she stated that she did get referral but is waiting for MA to be approved.  They do not accept private insurance.  I spoke with [facility] [phone], they have the referral patient is #48 on list.  That includes community referrals and they are giving priority to hospital referrals so number would be less.  They are also waiting for patient's MA to be approved.      Kavon, from [facility], [phone], called.  She is with patient's private insurance, [employer] [place] of [place].  She stated patient does have residential benefits and all referrals above are in network. [facility] and [facility] do not take private insurance.

## 2020-04-06 NOTE — ANESTHESIA PREPROCEDURE EVALUATION
Anesthesia Pre-Procedure Evaluation    Patient: Misty Parham   MRN: [id number] : 1985          Preoperative Diagnosis: * No pre-op diagnosis entered *    * No procedures listed *    Past Medical History:   Diagnosis Date     Depressive disorder      Past Surgical History:   Procedure Laterality Date     CHOLECYSTECTOMY       No Known Allergies  Prior to Admission medications    Medication Sig Start Date End Date Taking? Authorizing Provider   brexpiprazole (REXULTI) 3 MG tablet Take 1 tablet (3 mg) by mouth daily 20  Yes Baron Laughlin MD   lamoTRIgine (LAMICTAL) 150 MG tablet Take 300 mg by mouth daily   Yes Unknown, Entered By History   mirtazapine (REMERON) 45 MG tablet Take 1 tablet (45 mg) by mouth At Bedtime 20  Yes Baron Laughlin MD   venlafaxine (EFFEXOR-XR) 75 MG 24 hr capsule Take 3 capsules (225 mg) by mouth daily (with breakfast) 20  Yes Baron Laughlin MD   hydrOXYzine (ATARAX) 25 MG tablet Take 1-2 tablets (25-50 mg) by mouth 3 times daily as needed for anxiety 20   Baron Laughlin MD       Anesthesia Evaluation     . Pt has had prior anesthetic. Type: General    No history of anesthetic complications          ROS/MED HX    ENT/Pulmonary:      (-) tobacco use and sleep apnea   Neurologic:      (-) Delerium   Cardiovascular:  - neg cardiovascular ROS   (+) ----. : . . . :. . Previous cardiac testing date:results:date: results:ECG reviewed date:2020 results:NSR date: results:         (-) hypertension   METS/Exercise Tolerance:  >4 METS   Hematologic:         Musculoskeletal:         GI/Hepatic:        (-) GERD   Renal/Genitourinary:  - ROS Renal section negative       Endo: Comment: BMI 37    (+) Obesity (BMI 37), .      Psychiatric:     (+) psychiatric history (severe, requring ECT) depression      Infectious Disease:         Malignancy:         Other:    (+) No chance of pregnancy                                    Lab Results   Component Value Date    WBC 9.2  "02/17/2020    HGB 12.7 02/17/2020    HCT 39.1 02/17/2020     02/17/2020     03/28/2020    POTASSIUM 3.9 03/28/2020    CHLORIDE 105 03/28/2020    CO2 31 03/28/2020    BUN 16 03/28/2020    CR 0.96 03/28/2020    GLC 87 03/28/2020    CRISTINE 8.9 03/28/2020    ALBUMIN 3.8 02/17/2020    PROTTOTAL 7.6 02/17/2020    ALT 15 02/17/2020    AST 13 02/17/2020    ALKPHOS 78 02/17/2020    BILITOTAL 0.4 02/17/2020    TSH 1.08 02/19/2020    HCG Negative 02/19/2020    HCGS Negative 02/17/2020       Preop Vitals  BP Readings from Last 3 Encounters:   04/06/20 113/77   02/21/20 120/88   02/18/20 114/83    Pulse Readings from Last 3 Encounters:   04/05/20 80   02/21/20 109   02/18/20 95      Resp Readings from Last 3 Encounters:   04/06/20 16   02/21/20 16   02/18/20 16    SpO2 Readings from Last 3 Encounters:   04/06/20 98%   02/21/20 97%   02/18/20 99%      Temp Readings from Last 1 Encounters:   04/06/20 36.5  C (97.7  F) (Temporal)    Ht Readings from Last 1 Encounters:   03/30/20 1.651 m (5' 5\")      Wt Readings from Last 1 Encounters:   04/05/20 103.2 kg (227 lb 8 oz)    Estimated body mass index is 37.86 kg/m  as calculated from the following:    Height as of 3/30/20: 1.651 m (5' 5\").    Weight as of 4/5/20: 103.2 kg (227 lb 8 oz).       Anesthesia Plan      History & Physical Review  History and physical reviewed and following examination; no interval change.    ASA Status:  2 .    NPO Status:  > 8 hours    Plan for General (Mask ventilation) with Intravenous induction.            Postoperative Care  Postoperative pain management:  Multi-modal analgesia.      Consents  Anesthetic plan, risks, benefits and alternatives discussed with:  Patient..                   Reina Cole MD  "

## 2020-04-06 NOTE — PROGRESS NOTES
met with patient for a brief 1:1 meeting in lieu of formal Process Group today. Patient was given a handout with today's theme and had an opportunity to discuss this with . Patient was noted to be laying in bed at the time and had complaints of a headache following ECT earlier today. She stated that she feels that ECT has helped and that she is experiencing increased vitality as a result. She stated that she hoped to talk to her  on the phone later today.

## 2020-04-06 NOTE — PROGRESS NOTES
"[facility]  Psychiatric Progress Note    Length of stay (days): 45        Interim History:   The patient's care was discussed with the treatment team during the daily team meeting and/or staff's chart notes were reviewed.  Staff report: No acute issues overnight.  She has only been eating breakfast and skipping the remainder of her meals.  Reporting suicidal ideation at times.  Scored a 9 out of 30 on her Hy[facility] assessment on Friday April 3 (non-ECT day).    Depression severity scale 0-10 (10=most severe):  Today: 8    Her mood is slightly better today however severe depressive symptoms continue.  Her primary stressor remains unchanged (disintegration of her family and inability to reside together in one household).    Energy is low, appetite is low, concentration is poor.  She continues to minimize food intake however has been eating the majority of her breakfast and some fruit throughout the day.    Suicidal thoughts are present however she contracts for safety on the unit.  No homicidal thoughts reported.    Auditory hallucinations have been minimal and not actively reported today.    Tolerating medications without side effects.    She is agreeable to pursuing residential treatment however does not feel ready enough to transition into that treatment mode yet.           Medications:       brexpiprazole  4 mg Oral Daily     lamoTRIgine  200 mg Oral Daily     lithium ER  450 mg Oral Q12H JASMINE     mirtazapine  45 mg Oral At Bedtime     ramelteon  8 mg Oral At Bedtime     sodium chloride (PF)  3 mL Intracatheter Q8H     venlafaxine  225 mg Oral Daily with breakfast          Allergies:   No Known Allergies       Labs:     No results found for this or any previous visit (from the past 24 hour(s)).       Psychiatric Examination:     /80   Pulse 86   Temp 97.7  F (36.5  C) (Oral)   Resp 15   Ht 1.651 m (5' 5\")   Wt 103.2 kg (227 lb 8 oz)   SpO2 95%   BMI 37.86 kg/m    Weight is 227 lbs 8 oz  " Body mass index is 37.86 kg/m .  Orthostatic Vitals     None            Appearance: awake, alert  Attitude:  cooperative  Eye Contact:  fair  Mood:  depressed  Affect:  intensity is blunted  Speech:  decreased prosody  Psychomotor Behavior:  no evidence of tardive dyskinesia, dystonia, or tics  Throught Process:  linear  Associations:  no loose associations  Thought Content:  no evidence of psychotic thought and active suicidal ideation present  Insight:  partial  Judgement:  fair  Oriented to:  time, person, and place  Attention Span and Concentration:  fair  Recent and Remote Memory:  intact    Clinical Global Impressions  First:     Most recent:            Precautions:     Behavioral Orders   Procedures     Code 1 - Restrict to Unit     Code 2 - 1:1 Staff Supervision     For ECT only     Code 2 - 1:1 Staff Supervision     For ECT only     Electroconvulsive therapy     Series of up to 12 treatments. Begin Date: 2/26/20   Treating Psychiatrist providing ECT: Doug Devi MD   Notified on: 2/25/20     Electroconvulsive therapy     Series of up to 12 treatments. Begin Date: 2/26/20   Treating Psychiatrist providing ECT: Doug Devi MD   Notified on: 2/25/20     Electroconvulsive therapy     Series of up to 12 treatments. Begin Date: 2/26/20   Treating Psychiatrist providing ECT: Doug Devi MD   Notified on: 2/25/20     Electroconvulsive therapy     Series of up to 12 treatments. Begin Date: 2/26/20   Treating Psychiatrist providing ECT: Doug Devi MD   Notified on: 2/25/20     Electroconvulsive therapy     Series of up to 12 treatments. Begin Date: 2/26/20   Treating Psychiatrist providing ECT: Doug Devi MD   Notified on: 2/25/20     Electroconvulsive therapy     Series of up to 12 treatments. Begin Date: 2/26/20   Treating Psychiatrist providing ECT: Doug Devi MD   Notified on: 2/25/20     Electroconvulsive therapy     Series of up to  12 treatments. Begin Date: 2/26/20   Treating Psychiatrist providing ECT: Doug Devi MD   Notified on: 2/25/20     Electroconvulsive therapy     Series of up to 12 treatments. Begin Date: 2/26/20   Treating Psychiatrist providing ECT: Doug Devi MD   Notified on: 2/25/20     Fall precautions     Fall precautions     Routine Programming     As clinically indicated     Status 15     Every 15 minutes.          DIagnoses:     Major depressive disorder-recurrent, severe with psychotic features  Generalized anxiety disorder  Unspecified personality disorder         Plan:     Continue the higher dose of Effexor. Tolerating well so far.  Continue to monitor response as we aim to reduce depressive symptoms.  Continue augmentation with mirtazapine.  Tolerating the higher dose of Rexulti.  Monitoring response aimed at reducing psychosis.  Continue lithium at the current dose.  Serum lithium level today was noted at 0.85.    Treatment with ECT has been resumed while under a [facility].  Treatment will be continued twice a week, Monday and Wednesday, as we await the court's decision regarding my petition to utilize ECT 3 times a week.  Hearing was conducted on Friday and we are awaiting the court's decision.  --Monitoring [place] assessments: Scored a 9 on April 3, 2020.  Plan to recheck at the end of the week and consider discontinuing ECT if her score remains consistent.    Psychosocial treatments to be addressed with social work consult and groups.  Consider neuropsychological testing to explore underlying personality characteristics that could be complicating her treatment course.  May need to await cognitive impairments related to ECT to resolve prior to testing.    Legal Status: full commitment with [facility]    Disposition: Explore intensive residential treatment services once the patient has the appropriate funding source for this treatment mode.

## 2020-04-06 NOTE — ANESTHESIA POSTPROCEDURE EVALUATION
Patient: Misty Parham    * No procedures listed *    Diagnosis:* No pre-op diagnosis entered *  Diagnosis Additional Information: No value filed.    Anesthesia Type:  General    Note:  Anesthesia Post Evaluation    Patient location during evaluation: PACU  Patient participation: Able to fully participate in evaluation  Level of consciousness: awake and alert  Pain management: adequate  Airway patency: patent  Cardiovascular status: acceptable, hemodynamically stable and blood pressure returned to baseline  Respiratory status: acceptable  Hydration status: acceptable  PONV: none     Anesthetic complications: None          Last vitals:  Vitals:    04/06/20 0700 04/06/20 0705 04/06/20 0710   BP: 111/79 110/77 111/82   Pulse: 82 84    Resp: 12 16 13   Temp:      SpO2: 98% 96% 93%         Electronically Signed By: Reina Cole MD  April 6, 2020  7:14 AM

## 2020-04-06 NOTE — PROCEDURES
[facility] ECT Procedure Note     Misty Parham [id number]   34 year old 1985     Patient Status: Inpatient    No Known Allergies    Weight:  227 lbs 8 oz    Patient Preparations: Glasses/Contacts removed         Diagnosis:   Major depression     Medications ineffective, Deteriorating fluid/electrolyte/nutritional status, History of good ECT response in one or more previous episodes of illness and currently receiving maintenance  ECT per lucas truong order 2X per week for duration of commitment. She reports improvement in her mood since starting lithium       Pause for the Cause:     Right patient Yes   Right procedure/laterality settings: Yes   Right diagnosis Yes          Intra-Procedure Documentation:     Date:  4/6/2020  Time:  6:40AM    ECT #    Treatment number this series: 12   Total treatment number: 12   Type of ECT:  Bilateral, standard    ECT Medications administered: Brevital: 100mg  Succinyl Choline: 100mg         Clinical Narrative:     ECT was administered by Thymatron machine.  Pt has been medically cleared for procedure, consent not needed as ECT tx is being done under court order. Side effects, Risks and benefits reviewed. Misty is IP, receiving ECT under price truong order, 2 ECT tx per week for duration of committment M/W schedule until price-truong is amended for more frequent treatment-we are still waiting on this    ECT Strip Summary:     Energy Level: 100 percent  Motor Seizure Duration:32  seconds  EEG Seizure Duration: 32 seconds    Complications: none  Plan: 13th maint  ECT 4/8/2020 per price truong order for duration of committment. Lithium initiated for augmentation    Doug Devi MD

## 2020-04-07 PROCEDURE — 25000132 ZZH RX MED GY IP 250 OP 250 PS 637: Performed by: PSYCHIATRY & NEUROLOGY

## 2020-04-07 PROCEDURE — 12400000 ZZH R&B MH

## 2020-04-07 PROCEDURE — 99232 SBSQ HOSP IP/OBS MODERATE 35: CPT | Performed by: PSYCHIATRY & NEUROLOGY

## 2020-04-07 RX ADMIN — LAMOTRIGINE 200 MG: 100 TABLET ORAL at 08:28

## 2020-04-07 RX ADMIN — MIRTAZAPINE 45 MG: 45 TABLET, FILM COATED ORAL at 20:18

## 2020-04-07 RX ADMIN — BREXPIPRAZOLE 4 MG: 1 TABLET ORAL at 08:28

## 2020-04-07 RX ADMIN — LITHIUM CARBONATE 450 MG: 450 TABLET, EXTENDED RELEASE ORAL at 08:28

## 2020-04-07 RX ADMIN — LITHIUM CARBONATE 450 MG: 450 TABLET, EXTENDED RELEASE ORAL at 20:18

## 2020-04-07 RX ADMIN — RAMELTEON 8 MG: 8 TABLET ORAL at 20:18

## 2020-04-07 RX ADMIN — VENLAFAXINE HYDROCHLORIDE 225 MG: 150 CAPSULE, EXTENDED RELEASE ORAL at 08:28

## 2020-04-07 ASSESSMENT — MIFFLIN-ST. JEOR: SCORE: 1722.81

## 2020-04-07 ASSESSMENT — ACTIVITIES OF DAILY LIVING (ADL)
LAUNDRY: UNABLE TO COMPLETE
ORAL_HYGIENE: INDEPENDENT
DRESS: INDEPENDENT
HYGIENE/GROOMING: INDEPENDENT

## 2020-04-07 NOTE — PLAN OF CARE
Pt reports chronic suicidal thoughts and denies a plan. Affect was blunt/flat and her mood was depressed/clam. She rates depression and anxiety both as 5/10. When asked what year it was, she stated that it was 2018 and could not report which month it was. During the evening shift pt was isolative and withdrawn to her room.

## 2020-04-07 NOTE — PROGRESS NOTES
As of 4-6-20, there is a new [facility] court order, #.   Pt may have up to 3 tx's of ECT per week for up to 5 weeks.   Maintenance tx's can be as often as 2 week for the duration of commitment.

## 2020-04-07 NOTE — PROGRESS NOTES
"[facility]  Psychiatric Progress Note    Length of stay (days): 46        Interim History:   The patient's care was discussed with the treatment team during the daily team meeting and/or staff's chart notes were reviewed.  Staff report: No acute issues overnight.  Reports indicate that she continues to endorse depressed mood and occasional suicidal ideation.  Attending some groups.  Eating most of her meals.  Taking medications.  No reports of self-injurious behavior.    Depression severity scale 0-10 (10=most severe):  Today: 8    She reports that her mood is slightly worse today which she attributes to some family conflict.  This tends to be an ongoing theme which negatively impacts her mood.  She reports that her mother and  are arguing regarding her mail.  Her son is doing well in his new living arrangement.    She has also noticed memory impairment associated with ECT.  This is also negatively impacted her mood and self-esteem.    Energy is low, appetite is low, concentration is poor. .    Suicidal thoughts are present however she contracts for safety on the unit.  No homicidal thoughts reported.    She denied auditory hallucinations today.    Tolerating medications without side effects.    She is agreeable to pursuing residential treatment however does not feel ready enough to transition into that treatment mode yet.           Medications:       brexpiprazole  4 mg Oral Daily     lamoTRIgine  200 mg Oral Daily     lithium ER  450 mg Oral Q12H JASMINE     mirtazapine  45 mg Oral At Bedtime     ramelteon  8 mg Oral At Bedtime     venlafaxine  225 mg Oral Daily with breakfast          Allergies:   No Known Allergies       Labs:     No results found for this or any previous visit (from the past 24 hour(s)).       Psychiatric Examination:     /73   Pulse 96   Temp 98.5  F (36.9  C) (Oral)   Resp 16   Ht 1.651 m (5' 5\")   Wt 103.2 kg (227 lb 8 oz)   SpO2 94%   BMI 37.86 kg/m  "   Weight is 227 lbs 8 oz  Body mass index is 37.86 kg/m .  Orthostatic Vitals     None            Appearance: awake, alert  Attitude:  cooperative  Eye Contact:  fair  Mood:  depressed  Affect:  intensity is blunted  Speech:  decreased prosody  Psychomotor Behavior:  no evidence of tardive dyskinesia, dystonia, or tics  Throught Process:  linear  Associations:  no loose associations  Thought Content:  no evidence of psychotic thought and active suicidal ideation present  Insight:  partial  Judgement:  fair  Oriented to:  time, person, and place  Attention Span and Concentration:  fair  Recent and Remote Memory:  intact    Clinical Global Impressions  First:     Most recent:            Precautions:     Behavioral Orders   Procedures     Code 1 - Restrict to Unit     Code 2 - 1:1 Staff Supervision     For ECT only     Code 2 - 1:1 Staff Supervision     For ECT only     Electroconvulsive therapy     Series of up to 12 treatments. Begin Date: 2/26/20   Treating Psychiatrist providing ECT: Doug Devi MD   Notified on: 2/25/20     Electroconvulsive therapy     Series of up to 12 treatments. Begin Date: 2/26/20   Treating Psychiatrist providing ECT: Doug Devi MD   Notified on: 2/25/20     Electroconvulsive therapy     Series of up to 12 treatments. Begin Date: 2/26/20   Treating Psychiatrist providing ECT: Doug Devi MD   Notified on: 2/25/20     Electroconvulsive therapy     Series of up to 12 treatments. Begin Date: 2/26/20   Treating Psychiatrist providing ECT: Doug Devi MD   Notified on: 2/25/20     Electroconvulsive therapy     Series of up to 12 treatments. Begin Date: 2/26/20   Treating Psychiatrist providing ECT: Doug Devi MD   Notified on: 2/25/20     Electroconvulsive therapy     Series of up to 12 treatments. Begin Date: 2/26/20   Treating Psychiatrist providing ECT: Doug Devi MD   Notified on: 2/25/20     Electroconvulsive  therapy     Series of up to 12 treatments. Begin Date: 2/26/20   Treating Psychiatrist providing ECT: Doug Devi MD   Notified on: 2/25/20     Electroconvulsive therapy     Series of up to 12 treatments. Begin Date: 2/26/20   Treating Psychiatrist providing ECT: Doug Devi MD   Notified on: 2/25/20     Fall precautions     Fall precautions     Routine Programming     As clinically indicated     Status 15     Every 15 minutes.          DIagnoses:     Major depressive disorder-recurrent, severe with psychotic features  Generalized anxiety disorder  Suspect borderline personality disorder         Plan:     Continue the higher dose of Effexor. Tolerating well so far.  Continue to monitor response as we aim to reduce depressive symptoms.  Continue augmentation with mirtazapine.  Tolerating the higher dose of Rexulti.  Monitoring response aimed at reducing psychosis.  Continue lithium at the current dose.  Serum lithium level was recently 0.85 and stable since the last level.    ECT: We will conduct 1 more treatment session on Wednesday then plan to discontinue ECT to preserve her cognition.  --Monitoring [place] assessments: Scored a 9 on April 3, 2020.    --Travis Hoerd was approved to resume an acute series 3 times a week however we will not proceed with utilizing the order at this time due to the reason mentioned above.  The order will be active if needed in the future.    Psychosocial treatments to be addressed with social work consult and groups.    --Consider neuropsychological testing to explore underlying personality characteristics that could be complicating her treatment course.  Once her cognition has improved, consider obtaining testing.    Legal Status: full commitment with [facility] Andersen    Disposition: Explore intensive residential treatment services once the patient has the appropriate funding source for this treatment mode.  Awaiting activation of medical assistance.     I also  spoke with the patient today regarding the likelihood of utilizing telemedicine beginning tomorrow.  She was in agreement.

## 2020-04-07 NOTE — PLAN OF CARE
Problem: Suicidal Behavior  Goal: Suicidal Behavior is Absent or Managed  Outcome: Improving  Flowsheets (Taken 4/7/2020 6267)  Mutually Determined Action Steps (Facilitate Resolution of Suicidal Intent): sets future-oriented goal  Mutually Determined Action Steps (Provide Immediate/Ongoing Protective Physical Environment): shares suicidal thoughts  Note: Patient denies suicidal ideation today. She denies hallucinations. Her thought process is slow and her memory is impaired. She did not know what month it was and took awhile to name the year.  She did smile during our conversation. Plan is for ECT in the am.  She attended groups and did eat breakfast and lunch.

## 2020-04-07 NOTE — PROGRESS NOTES
met with patient for a brief 1:1 meeting in lieu of formal Process Group today. Patient was given a handout with today's theme and had an opportunity to discuss this with . Patient was noted to be more quiet today. She took the handout and looked it over. She did not have any questions about it at this time.

## 2020-04-08 ENCOUNTER — ANESTHESIA EVENT (OUTPATIENT)
Dept: SURGERY | Facility: CLINIC | Age: 35
End: 2020-04-08

## 2020-04-08 ENCOUNTER — ANESTHESIA (OUTPATIENT)
Dept: SURGERY | Facility: CLINIC | Age: 35
End: 2020-04-08

## 2020-04-08 ENCOUNTER — APPOINTMENT (OUTPATIENT)
Dept: SURGERY | Facility: CLINIC | Age: 35
End: 2020-04-08
Payer: COMMERCIAL

## 2020-04-08 PROCEDURE — 90870 ELECTROCONVULSIVE THERAPY: CPT

## 2020-04-08 PROCEDURE — 25000132 ZZH RX MED GY IP 250 OP 250 PS 637: Performed by: PSYCHIATRY & NEUROLOGY

## 2020-04-08 PROCEDURE — 40000671 ZZH STATISTIC ANESTHESIA CASE

## 2020-04-08 PROCEDURE — 25000125 ZZHC RX 250: Performed by: NURSE ANESTHETIST, CERTIFIED REGISTERED

## 2020-04-08 PROCEDURE — 99232 SBSQ HOSP IP/OBS MODERATE 35: CPT | Mod: 25 | Performed by: PSYCHIATRY & NEUROLOGY

## 2020-04-08 PROCEDURE — 12400000 ZZH R&B MH

## 2020-04-08 PROCEDURE — 25000128 H RX IP 250 OP 636: Performed by: NURSE ANESTHETIST, CERTIFIED REGISTERED

## 2020-04-08 RX ADMIN — METHOHEXITAL SODIUM 100 MG: 500 INJECTION, POWDER, LYOPHILIZED, FOR SOLUTION INTRAMUSCULAR; INTRAVENOUS; RECTAL at 06:38

## 2020-04-08 RX ADMIN — BREXPIPRAZOLE 4 MG: 1 TABLET ORAL at 08:01

## 2020-04-08 RX ADMIN — LAMOTRIGINE 200 MG: 100 TABLET ORAL at 08:01

## 2020-04-08 RX ADMIN — LITHIUM CARBONATE 450 MG: 450 TABLET, EXTENDED RELEASE ORAL at 08:01

## 2020-04-08 RX ADMIN — SUCCINYLCHOLINE CHLORIDE 100 MG: 20 INJECTION, SOLUTION INTRAMUSCULAR; INTRAVENOUS; PARENTERAL at 06:38

## 2020-04-08 RX ADMIN — LITHIUM CARBONATE 450 MG: 450 TABLET, EXTENDED RELEASE ORAL at 21:03

## 2020-04-08 RX ADMIN — VENLAFAXINE HYDROCHLORIDE 225 MG: 150 CAPSULE, EXTENDED RELEASE ORAL at 08:01

## 2020-04-08 ASSESSMENT — ACTIVITIES OF DAILY LIVING (ADL)
DRESS: SCRUBS (BEHAVIORAL HEALTH)
DRESS: INDEPENDENT
LAUNDRY: UNABLE TO COMPLETE
HYGIENE/GROOMING: INDEPENDENT
LAUNDRY: UNABLE TO COMPLETE
HYGIENE/GROOMING: INDEPENDENT
ORAL_HYGIENE: INDEPENDENT
ORAL_HYGIENE: INDEPENDENT

## 2020-04-08 ASSESSMENT — LIFESTYLE VARIABLES: TOBACCO_USE: 0

## 2020-04-08 NOTE — PLAN OF CARE
"patient had last ECT today. States it went \"good\". She spent most of the shift in her room. Mood is sad and depressed. Teary at times. States she misses her kids today. Affect is flat and blunt. Chronic SI. Pleasant and cooperative.   "

## 2020-04-08 NOTE — ANESTHESIA PREPROCEDURE EVALUATION
Anesthesia Pre-Procedure Evaluation    Patient: Misty Parham   MRN: [id number] : 1985          Preoperative Diagnosis: * No pre-op diagnosis entered *    * No procedures listed *    Past Medical History:   Diagnosis Date     Depressive disorder      Past Surgical History:   Procedure Laterality Date     CHOLECYSTECTOMY       No Known Allergies  Prior to Admission medications    Medication Sig Start Date End Date Taking? Authorizing Provider   brexpiprazole (REXULTI) 3 MG tablet Take 1 tablet (3 mg) by mouth daily 20  Yes Baron Laughlin MD   lamoTRIgine (LAMICTAL) 150 MG tablet Take 300 mg by mouth daily   Yes Unknown, Entered By History   mirtazapine (REMERON) 45 MG tablet Take 1 tablet (45 mg) by mouth At Bedtime 20  Yes Baron Laughlin MD   venlafaxine (EFFEXOR-XR) 75 MG 24 hr capsule Take 3 capsules (225 mg) by mouth daily (with breakfast) 20  Yes Baron Laughlin MD   hydrOXYzine (ATARAX) 25 MG tablet Take 1-2 tablets (25-50 mg) by mouth 3 times daily as needed for anxiety 20   Baron Laughlin MD       Anesthesia Evaluation     . Pt has had prior anesthetic. Type: General    No history of anesthetic complications          ROS/MED HX    ENT/Pulmonary:      (-) tobacco use and sleep apnea   Neurologic:      (-) Delerium   Cardiovascular:  - neg cardiovascular ROS   (+) ----. : . . . :. . Previous cardiac testing date:results:date: results:ECG reviewed date:2020 results:NSR date: results:         (-) hypertension   METS/Exercise Tolerance:  >4 METS   Hematologic:         Musculoskeletal:         GI/Hepatic:        (-) GERD   Renal/Genitourinary:  - ROS Renal section negative       Endo: Comment: BMI 37    (+) Obesity (BMI 37), .      Psychiatric:     (+) psychiatric history (severe, requring ECT) depression      Infectious Disease:         Malignancy:         Other:    (+) No chance of pregnancy                                    Lab Results   Component Value Date    WBC 9.2  "02/17/2020    HGB 12.7 02/17/2020    HCT 39.1 02/17/2020     02/17/2020     03/28/2020    POTASSIUM 3.9 03/28/2020    CHLORIDE 105 03/28/2020    CO2 31 03/28/2020    BUN 16 03/28/2020    CR 0.96 03/28/2020    GLC 87 03/28/2020    CRISTINE 8.9 03/28/2020    ALBUMIN 3.8 02/17/2020    PROTTOTAL 7.6 02/17/2020    ALT 15 02/17/2020    AST 13 02/17/2020    ALKPHOS 78 02/17/2020    BILITOTAL 0.4 02/17/2020    TSH 1.08 02/19/2020    HCG Negative 02/19/2020    HCGS Negative 02/17/2020       Preop Vitals  BP Readings from Last 3 Encounters:   04/08/20 113/75   02/21/20 120/88   02/18/20 114/83    Pulse Readings from Last 3 Encounters:   04/06/20 96   02/21/20 109   02/18/20 95      Resp Readings from Last 3 Encounters:   04/08/20 16   02/21/20 16   02/18/20 16    SpO2 Readings from Last 3 Encounters:   04/08/20 98%   02/21/20 97%   02/18/20 99%      Temp Readings from Last 1 Encounters:   04/08/20 35.9  C (96.7  F) (Temporal)    Ht Readings from Last 1 Encounters:   03/30/20 1.651 m (5' 5\")      Wt Readings from Last 1 Encounters:   04/07/20 103.2 kg (227 lb 8 oz)    Estimated body mass index is 37.86 kg/m  as calculated from the following:    Height as of 3/30/20: 1.651 m (5' 5\").    Weight as of 4/7/20: 103.2 kg (227 lb 8 oz).       Anesthesia Plan      History & Physical Review  History and physical reviewed and following examination; no interval change.    ASA Status:  2 .    NPO Status:  > 8 hours    Plan for General (Mask ventilation) with Intravenous induction.            Postoperative Care  Postoperative pain management:  Multi-modal analgesia.      Consents  Anesthetic plan, risks, benefits and alternatives discussed with:  Patient..                   Luiza Lima MD  "

## 2020-04-08 NOTE — PROGRESS NOTES
.gCase Manager met with patient for a brief 1:1 meeting in lieu of formal Process Group today. Patient was given a handout with today's theme and had an opportunity to discuss this with .  Patient was noted to be laying in bed when  met with her. She stated that she has a headache following ECT today. Patient accepted the handout and did not have any questions about it at this time.

## 2020-04-08 NOTE — PROGRESS NOTES
I emailed and left a voice mail for Herbert in our business office, requesting an update on pt's funding status -- whether she is still open to private insurance and the status on the Medical Assistance application.    Dr Laughlin suggested we get pt's mother involved in assisting us - maybe a conference call.   I called and spoke with pt's mother about this.   She said in the past, pt has been pretty consistent in NOT allowing her to help in any way.    I told pt's mother that maybe once pt's cognition clears some, Dr Laughlin can persuade the patient in allowing mother to get involved with this.

## 2020-04-08 NOTE — PROGRESS NOTES
"[facility]  Psychiatric Progress Note    Length of stay (days): 47        Interim History:   The patient's care was discussed with the treatment team during the daily team meeting and/or staff's chart notes were reviewed.  Staff report: No acute issues overnight.  Reports indicate that she continues to endorse depressed mood and occasional suicidal ideation.  Attending some groups.  Eating most of her meals.  Taking medications.  No reports of self-injurious behavior.    Depression severity scale 0-10 (10=most severe):  Today: 7    Her mood is slightly better today after ECT which has consistently noted on the days of ECT however her cognition is also significantly affected as well.  She did not recall much of her conversation from yesterday.  I reminded her of our plan to discontinue ECT after today.  She was in agreement.    Energy is slightly better today however overall poor.  Concentration is limited.  Appetite is normal.  Sleeping well at night.    Suicidal thoughts are present however she contracts for safety on the unit.  No homicidal thoughts reported.    She denied auditory hallucinations today.    Tolerating medications without side effects.    She is agreeable to pursuing residential treatment however does not feel ready enough to transition into that treatment mode yet.           Medications:       brexpiprazole  4 mg Oral Daily     lamoTRIgine  200 mg Oral Daily     lidocaine   Transdermal Q8H     lithium ER  450 mg Oral Q12H JASMINE     mirtazapine  45 mg Oral At Bedtime     ramelteon  8 mg Oral At Bedtime     venlafaxine  225 mg Oral Daily with breakfast          Allergies:   No Known Allergies       Labs:     No results found for this or any previous visit (from the past 24 hour(s)).       Psychiatric Examination:     /75   Pulse 86   Temp 98.5  F (36.9  C) (Oral)   Resp 16   Ht 1.651 m (5' 5\")   Wt 103.2 kg (227 lb 8 oz)   SpO2 97%   BMI 37.86 kg/m    Weight is 227 lbs 8 " oz  Body mass index is 37.86 kg/m .  Orthostatic Vitals     None            Appearance: awake, alert  Attitude:  cooperative  Eye Contact:  fair  Mood:  depressed  Affect:  mood congruent and slightly brighter today  Speech:  decreased prosody  Psychomotor Behavior:  no evidence of tardive dyskinesia, dystonia, or tics  Throught Process:  linear  Associations:  no loose associations  Thought Content:  no evidence of psychotic thought and active suicidal ideation present  Insight:  partial  Judgement:  fair  Oriented to:  time, person, and place  Attention Span and Concentration:  fair  Recent and Remote Memory:  intact    Clinical Global Impressions  First:     Most recent:            Precautions:     Behavioral Orders   Procedures     Code 1 - Restrict to Unit     Code 2 - 1:1 Staff Supervision     For ECT only     Code 2 - 1:1 Staff Supervision     For ECT only     Electroconvulsive therapy     Series of up to 12 treatments. Begin Date: 2/26/20   Treating Psychiatrist providing ECT: Doug Devi MD   Notified on: 2/25/20     Electroconvulsive therapy     Series of up to 12 treatments. Begin Date: 2/26/20   Treating Psychiatrist providing ECT: Doug Devi MD   Notified on: 2/25/20     Electroconvulsive therapy     Series of up to 12 treatments. Begin Date: 2/26/20   Treating Psychiatrist providing ECT: Doug Devi MD   Notified on: 2/25/20     Electroconvulsive therapy     Series of up to 12 treatments. Begin Date: 2/26/20   Treating Psychiatrist providing ECT: Doug Devi MD   Notified on: 2/25/20     Electroconvulsive therapy     Series of up to 12 treatments. Begin Date: 2/26/20   Treating Psychiatrist providing ECT: Doug Devi MD   Notified on: 2/25/20     Electroconvulsive therapy     Series of up to 12 treatments. Begin Date: 2/26/20   Treating Psychiatrist providing ECT: Doug Devi MD   Notified on: 2/25/20     Electroconvulsive  therapy     Series of up to 12 treatments. Begin Date: 2/26/20   Treating Psychiatrist providing ECT: Doug Devi MD   Notified on: 2/25/20     Electroconvulsive therapy     Series of up to 12 treatments. Begin Date: 2/26/20   Treating Psychiatrist providing ECT: Doug Devi MD   Notified on: 2/25/20     Fall precautions     Fall precautions     Routine Programming     As clinically indicated     Status 15     Every 15 minutes.          DIagnoses:     Major depressive disorder-recurrent, severe with psychotic features  Generalized anxiety disorder  Suspect borderline personality disorder         Plan:     Continue the higher dose of Effexor. Tolerating well so far.  Continue to monitor response as we aim to reduce depressive symptoms.  Continue augmentation with mirtazapine.  Tolerating the higher dose of Rexulti.  Monitoring response aimed at reducing psychosis.  Continue lithium at the current dose.  Serum lithium level was recently 0.85 and stable since the last level.    ECT: As planned, today will be her last ECT which will be discontinued due to worsening cognitive side effects.  --Monitoring [place] assessments: Scored a 9 on April 3, 2020.    --Travis Andersen was approved to resume an acute series 3 times a week however we will not proceed with utilizing the order at this time due to the reason mentioned above.  The order will be active if needed in the future.    Psychosocial treatments to be addressed with social work consult and groups.    --Consider neuropsychological testing to explore underlying personality characteristics that could be complicating her treatment course.  Once her cognition has improved, consider obtaining testing.  --Our  will assist the patient in working with her , family members, and our business office to assist her in acquiring medical assistance    Legal Status: full commitment with Travis Andersen    Disposition: Explore intensive  residential treatment services once the patient has the appropriate funding source for this treatment mode.  Awaiting activation of medical assistance.     I again spoke with the patient today regarding the likelihood of utilizing telemedicine as soon as we are able to.  She was in agreement.

## 2020-04-08 NOTE — PROCEDURES
[facility] ECT Procedure Note     Misty Parham [id number]   34 year old 1985     Patient Status: Inpatient    No Known Allergies    Weight:  227 lbs 8 oz    Patient Preparations: Glasses/Contacts removed         Diagnosis:   Major depression     Medications ineffective, Deteriorating fluid/electrolyte/nutritional status, History of good ECT response in one or more previous episodes of illness and currently receiving maintenance  ECT per lucas truong order 2X per week for duration of commitment. She reports improvement in her mood since starting lithium       Pause for the Cause:     Right patient Yes   Right procedure/laterality settings: Yes   Right diagnosis Yes          Intra-Procedure Documentation:     Date:  4/8/2020  Time:  6:30AM    ECT #    Treatment number this series: 13   Total treatment number: 13   Type of ECT:  Bilateral, standard    ECT Medications administered: Brevital: 100mg  Succinyl Choline: 100mg         Clinical Narrative:     ECT was administered by Thymatron machine.  Pt has been medically cleared for procedure, consent not needed as ECT tx is being done under court order. Side effects, Risks and benefits reviewed. Misty is IP, receiving ECT under price truong order, The lucas Truong order was amended this week so she can receive a total of 15 BL tx's MWF for 3 wks followed by 2X weekly maintenance for duration of commitment    ECT Strip Summary:     Energy Level: 100 percent  Motor Seizure Duration:31  seconds  EEG Seizure Duration: 31 seconds    Complications: none  Plan: 14th   ECT 4/10/2020 per price truong order-order amended so up to 15 BL tx's can be given over 3 wks M/W/F followed by 2X weekly maintenance for duration of commitment-will coordinate with Dr Laughlin as pt has had lengthy series with initial 6 BL tx, followed by maintenance over he last several weeks with some benefit    Doug Devi MD

## 2020-04-08 NOTE — ANESTHESIA POSTPROCEDURE EVALUATION
Patient: Misty Parham    * No procedures listed *    Diagnosis:* No pre-op diagnosis entered *  Diagnosis Additional Information: No value filed.    Anesthesia Type:  General    Note:  Anesthesia Post Evaluation    Patient location during evaluation: PACU  Patient participation: Able to fully participate in evaluation  Level of consciousness: awake and alert  Pain management: adequate  Airway patency: patent  Cardiovascular status: acceptable  Respiratory status: acceptable and unassisted  Hydration status: acceptable  PONV: none             Last vitals:  Vitals:    04/08/20 0546 04/08/20 0649 04/08/20 0650   BP: 113/75 (!) 110/98 111/72   Pulse:      Resp: 16 20 20   Temp: 35.9  C (96.7  F) 36.5  C (97.7  F)    SpO2: 98% 95% 97%         Electronically Signed By: Luiza Lima MD  April 8, 2020  7:01 AM

## 2020-04-08 NOTE — PLAN OF CARE
OT- Orders received for IP OT. OT on MH unit addressing cognition, please see chart. Will complete IP OT orders/evaluation.

## 2020-04-08 NOTE — PLAN OF CARE
Patient is A&O, presents as sad and depressed in mood, with full range affect. Patient's thought process is organized this evening. She has been more visible on the unit, but minimally social. Spent the majority of the evening watching TV in the lounge. Judgment continues to be poor. Patient denies  suicidal ideation. Patient also endorsed mental health symptoms of anxiety and depression. She reports recent stressors consist of a fight between her mother and her  over her mail. Per the patient, her mother refuses to give her mail to the patient's . Plan is ECT tomorrow 4/8/2020 morning.

## 2020-04-09 PROCEDURE — 12400000 ZZH R&B MH

## 2020-04-09 PROCEDURE — 99232 SBSQ HOSP IP/OBS MODERATE 35: CPT | Performed by: PSYCHIATRY & NEUROLOGY

## 2020-04-09 PROCEDURE — 25000132 ZZH RX MED GY IP 250 OP 250 PS 637: Performed by: PSYCHIATRY & NEUROLOGY

## 2020-04-09 RX ADMIN — MIRTAZAPINE 45 MG: 45 TABLET, FILM COATED ORAL at 20:45

## 2020-04-09 RX ADMIN — BREXPIPRAZOLE 4 MG: 1 TABLET ORAL at 08:06

## 2020-04-09 RX ADMIN — LAMOTRIGINE 200 MG: 100 TABLET ORAL at 08:06

## 2020-04-09 RX ADMIN — LITHIUM CARBONATE 450 MG: 450 TABLET, EXTENDED RELEASE ORAL at 20:45

## 2020-04-09 RX ADMIN — LITHIUM CARBONATE 450 MG: 450 TABLET, EXTENDED RELEASE ORAL at 08:06

## 2020-04-09 RX ADMIN — RAMELTEON 8 MG: 8 TABLET ORAL at 20:45

## 2020-04-09 RX ADMIN — VENLAFAXINE HYDROCHLORIDE 225 MG: 150 CAPSULE, EXTENDED RELEASE ORAL at 08:07

## 2020-04-09 ASSESSMENT — ACTIVITIES OF DAILY LIVING (ADL)
HYGIENE/GROOMING: PROMPTS
HYGIENE/GROOMING: PROMPTS
DRESS: INDEPENDENT
DRESS: SCRUBS (BEHAVIORAL HEALTH)
LAUNDRY: UNABLE TO COMPLETE
LAUNDRY: WITH SUPERVISION
ORAL_HYGIENE: PROMPTS
ORAL_HYGIENE: PROMPTS

## 2020-04-09 ASSESSMENT — MIFFLIN-ST. JEOR: SCORE: 1731.89

## 2020-04-09 NOTE — PROGRESS NOTES
I talked to Reyna in [place] who said she contacted pt's private ins co and was told the ins co has no record of pt calling in to cancel her private ins.    Pt will also check with one more source at the ins co.  Additionally, Reyna said the policy will end on April 30th, even if the pt does not proactively terminate the policy herself.

## 2020-04-09 NOTE — PLAN OF CARE
"Pt was up all shift. Spent half of the shift in her room. Had Ect today. Attended groups with prompts. Pt ate %100 of dinner. She rated depression 10 out of 10 and anxiety 4 out of 10. Pt mentioned She still has Suicidal thoughts. She said \"I can not do anything here but I am thinking of doing it with knife when I get out\".   "

## 2020-04-09 NOTE — PROGRESS NOTES
"[facility]  Psychiatric Progress Note    Length of stay (days): 48        Interim History:   The patient's care was discussed with the treatment team during the daily team meeting and/or staff's chart notes were reviewed.  Staff report: No acute issues overnight.  Reports indicate that she continues to endorse depressed mood and occasional suicidal ideation.  Attending some groups.  Eating most of her meals.  Taking medications.  No reports of self-injurious behavior.    Depression severity scale 0-10 (10=most severe):  Today: 7    She was not able to recall our conversation yesterday.  She was mostly passive and quiet throughout the interview.  No acute issues or psychosocial stressors reported.  She was noted to be attending some groups throughout the day or out in the milieu.    Energy is slightly better today however overall poor.  Concentration is limited.  Appetite is normal.  Sleeping well at night.    Suicidal thoughts are present however she contracts for safety on the unit.  No homicidal thoughts reported.    She denied auditory hallucinations today.    Tolerating medications without side effects.    She is agreeable to pursuing residential treatment however does not feel ready enough to transition into that treatment mode yet.           Medications:       brexpiprazole  4 mg Oral Daily     lamoTRIgine  200 mg Oral Daily     lidocaine   Transdermal Q8H     lithium ER  450 mg Oral Q12H JASMINE     mirtazapine  45 mg Oral At Bedtime     ramelteon  8 mg Oral At Bedtime     venlafaxine  225 mg Oral Daily with breakfast          Allergies:   No Known Allergies       Labs:     No results found for this or any previous visit (from the past 24 hour(s)).       Psychiatric Examination:     /83   Pulse 89   Temp 98.5  F (36.9  C) (Oral)   Resp 15   Ht 1.651 m (5' 5\")   Wt 104.1 kg (229 lb 8 oz)   SpO2 98%   BMI 38.19 kg/m    Weight is 229 lbs 8 oz  Body mass index is 38.19 " kg/m .  Orthostatic Vitals     None            Appearance: awake, alert  Attitude:  cooperative  Eye Contact:  fair  Mood:  depressed  Affect:  mood congruent and slightly brighter today  Speech:  decreased prosody  Psychomotor Behavior:  no evidence of tardive dyskinesia, dystonia, or tics  Throught Process:  linear  Associations:  no loose associations  Thought Content:  no evidence of psychotic thought and active suicidal ideation present  Insight:  partial  Judgement:  fair  Oriented to:  time, person, and place  Attention Span and Concentration:  fair  Recent and Remote Memory:  intact    Clinical Global Impressions  First:     Most recent:            Precautions:     Behavioral Orders   Procedures     Code 1 - Restrict to Unit     Code 2 - 1:1 Staff Supervision     For ECT only     Code 2 - 1:1 Staff Supervision     For ECT only     Electroconvulsive therapy     Series of up to 12 treatments. Begin Date: 2/26/20   Treating Psychiatrist providing ECT: Doug Devi MD   Notified on: 2/25/20     Electroconvulsive therapy     Series of up to 12 treatments. Begin Date: 2/26/20   Treating Psychiatrist providing ECT: Doug Devi MD   Notified on: 2/25/20     Electroconvulsive therapy     Series of up to 12 treatments. Begin Date: 2/26/20   Treating Psychiatrist providing ECT: Doug Devi MD   Notified on: 2/25/20     Electroconvulsive therapy     Series of up to 12 treatments. Begin Date: 2/26/20   Treating Psychiatrist providing ECT: Doug Devi MD   Notified on: 2/25/20     Electroconvulsive therapy     Series of up to 12 treatments. Begin Date: 2/26/20   Treating Psychiatrist providing ECT: Doug Devi MD   Notified on: 2/25/20     Electroconvulsive therapy     Series of up to 12 treatments. Begin Date: 2/26/20   Treating Psychiatrist providing ECT: Doug Devi MD   Notified on: 2/25/20     Electroconvulsive therapy     Series of up to 12  treatments. Begin Date: 2/26/20   Treating Psychiatrist providing ECT: Doug Devi MD   Notified on: 2/25/20     Electroconvulsive therapy     Series of up to 12 treatments. Begin Date: 2/26/20   Treating Psychiatrist providing ECT: Doug Devi MD   Notified on: 2/25/20     Fall precautions     Fall precautions     Routine Programming     As clinically indicated     Status 15     Every 15 minutes.          DIagnoses:     Major depressive disorder-recurrent, severe with psychotic features  Generalized anxiety disorder  Suspect borderline personality disorder         Plan:     Continue the higher dose of Effexor. Tolerating well so far.  Continue to monitor response as we aim to reduce depressive symptoms.  Consider changing antidepressants if mood symptoms persist.  Continue augmentation with mirtazapine.  Tolerating the higher dose of Rexulti.  Monitoring response aimed at reducing psychosis.  Continue lithium at the current dose.  Serum lithium level was recently 0.85 and stable since the last level.    ECT:  discontinued due to worsening cognitive side effects.  She has likely maximized benefits from her current treatment course.  --Monitoring [place] assessments: Scored a 9 on April 3, 2020.    --Travis Andersen was approved to resume an acute series 3 times a week however we will not proceed with utilizing the order at this time due to the reason mentioned above.  The order will be active if needed in the future.    Psychosocial treatments to be addressed with social work consult and groups.    --Consider neuropsychological testing to explore underlying personality characteristics that could be complicating her treatment course.  Once her cognition has improved, consider obtaining testing.  --Our  will assist the patient in working with her , family members, and our business office to assist her in acquiring medical assistance    Legal Status: full commitment with  Trav[facility] Andersen    Disposition: Explore intensive residential treatment services once the patient has the appropriate funding source for this treatment mode.  Awaiting activation of medical assistance.     I again spoke with the patient today regarding the likelihood of utilizing telemedicine as soon as we are able to.  She was in agreement.

## 2020-04-09 NOTE — PLAN OF CARE
Shift Update: Pt mood is depressed/anxious. Thought process is impaired. Insight is poor. Pt has chronic thoughts no plan suicidal ideation. Pt states she is depressed and is slow to answer questions.She appears to be having difficulty processing information. When memory assessed she knows where she is,does not know the date or day . She could not remember who the President of [place] is. She cannot remember that we are waiting for IRTS placement. Will reorient pt frequently to plan of care etc..

## 2020-04-10 PROCEDURE — 25000132 ZZH RX MED GY IP 250 OP 250 PS 637: Performed by: PSYCHIATRY & NEUROLOGY

## 2020-04-10 PROCEDURE — 12400000 ZZH R&B MH

## 2020-04-10 PROCEDURE — 99231 SBSQ HOSP IP/OBS SF/LOW 25: CPT | Performed by: PSYCHIATRY & NEUROLOGY

## 2020-04-10 RX ADMIN — MIRTAZAPINE 45 MG: 45 TABLET, FILM COATED ORAL at 22:01

## 2020-04-10 RX ADMIN — LAMOTRIGINE 200 MG: 100 TABLET ORAL at 09:08

## 2020-04-10 RX ADMIN — LITHIUM CARBONATE 450 MG: 450 TABLET, EXTENDED RELEASE ORAL at 09:07

## 2020-04-10 RX ADMIN — VENLAFAXINE HYDROCHLORIDE 225 MG: 150 CAPSULE, EXTENDED RELEASE ORAL at 09:08

## 2020-04-10 RX ADMIN — BREXPIPRAZOLE 4 MG: 1 TABLET ORAL at 09:07

## 2020-04-10 RX ADMIN — RAMELTEON 8 MG: 8 TABLET ORAL at 22:01

## 2020-04-10 RX ADMIN — LITHIUM CARBONATE 450 MG: 450 TABLET, EXTENDED RELEASE ORAL at 22:01

## 2020-04-10 ASSESSMENT — ACTIVITIES OF DAILY LIVING (ADL)
LAUNDRY: UNABLE TO COMPLETE
HYGIENE/GROOMING: PROMPTS
ORAL_HYGIENE: PROMPTS
DRESS: SCRUBS (BEHAVIORAL HEALTH);INDEPENDENT
DRESS: SCRUBS (BEHAVIORAL HEALTH)
ORAL_HYGIENE: PROMPTS
LAUNDRY: UNABLE TO COMPLETE
HYGIENE/GROOMING: PROMPTS

## 2020-04-10 NOTE — PROGRESS NOTES
"[facility]  Psychiatric Progress Note    Length of stay (days): 49        Interim History:   The patient's care was discussed with the treatment team during the daily team meeting and/or staff's chart notes were reviewed.  Staff report: No acute issues overnight.  Reports indicate that she continues to endorse depressed mood and occasional suicidal ideation.  Attending some groups.  Eating most of her meals.  Taking medications.  No reports of self-injurious behavior.    Depression severity scale 0-10 (10=most severe):  Today: 7    She was not able to recall our conversation yesterday.  She was mostly passive and quiet throughout the interview.  No acute issues or psychosocial stressors reported.  She was noted to be attending some groups throughout the day or out in the milieu.    Energy is slightly better today however overall poor.  Concentration is limited.  Appetite is normal.  Sleeping well at night.    Suicidal thoughts are present however she contracts for safety on the unit.  No homicidal thoughts reported.    She denied auditory hallucinations today.    Tolerating medications without side effects.    She is agreeable to pursuing residential treatment however does not feel ready enough to transition into that treatment mode yet.           Medications:       brexpiprazole  4 mg Oral Daily     lamoTRIgine  200 mg Oral Daily     lithium ER  450 mg Oral Q12H JASMINE     mirtazapine  45 mg Oral At Bedtime     ramelteon  8 mg Oral At Bedtime     venlafaxine  225 mg Oral Daily with breakfast          Allergies:   No Known Allergies       Labs:     No results found for this or any previous visit (from the past 24 hour(s)).       Psychiatric Examination:     /78   Pulse 83   Temp 98.1  F (36.7  C) (Oral)   Resp 16   Ht 1.651 m (5' 5\")   Wt 104.1 kg (229 lb 8 oz)   SpO2 97%   BMI 38.19 kg/m    Weight is 229 lbs 8 oz  Body mass index is 38.19 kg/m .  Orthostatic Vitals     None    "         Appearance: awake, alert  Attitude:  cooperative  Eye Contact:  fair  Mood:  depressed  Affect:  mood congruent and slightly brighter today  Speech:  decreased prosody  Psychomotor Behavior:  no evidence of tardive dyskinesia, dystonia, or tics  Throught Process:  linear  Associations:  no loose associations  Thought Content:  no evidence of psychotic thought and active suicidal ideation present  Insight:  partial  Judgement:  fair  Oriented to:  time, person, and place  Attention Span and Concentration:  fair  Recent and Remote Memory:  intact    Clinical Global Impressions  First:     Most recent:            Precautions:     Behavioral Orders   Procedures     Code 1 - Restrict to Unit     Code 2 - 1:1 Staff Supervision     For ECT only     Code 2 - 1:1 Staff Supervision     For ECT only     Electroconvulsive therapy     Series of up to 12 treatments. Begin Date: 2/26/20   Treating Psychiatrist providing ECT: Doug Devi MD   Notified on: 2/25/20     Electroconvulsive therapy     Series of up to 12 treatments. Begin Date: 2/26/20   Treating Psychiatrist providing ECT: Doug Devi MD   Notified on: 2/25/20     Electroconvulsive therapy     Series of up to 12 treatments. Begin Date: 2/26/20   Treating Psychiatrist providing ECT: Doug Devi MD   Notified on: 2/25/20     Fall precautions     Fall precautions     Routine Programming     As clinically indicated     Status 15     Every 15 minutes.          DIagnoses:     Major depressive disorder-recurrent, severe with psychotic features  Generalized anxiety disorder  Suspect borderline personality disorder         Plan:     Continue the higher dose of Effexor. Tolerating well so far.  Continue to monitor response as we aim to reduce depressive symptoms.  Consider changing antidepressants if mood symptoms persist.  Continue augmentation with mirtazapine.  Tolerating the higher dose of Rexulti.  Monitoring response aimed at  reducing psychosis.  Continue lithium at the current dose.  Serum lithium level was recently 0.85 and stable since the last level.  Order Gene[employer]ight testing to explore if metabolism errors are complicating her treatment course.    ECT:  discontinued due to worsening cognitive side effects.  She has likely maximized benefits from her current treatment course.  --Monitoring [place] assessments:       9 on April 3, 2020.        12 on April 10, 2020    --Travis Andersen was approved to resume an acute series 3 times a week however we will not proceed with utilizing the order at this time due to the reason mentioned above.  The order will be active if needed in the future.    Psychosocial treatments to be addressed with social work consult and groups.    --Consider neuropsychological testing to explore underlying personality characteristics that could be complicating her treatment course.  Once her cognition has improved, consider obtaining testing.  --Our  will assist the patient in working with her , family members, and our business office to assist her in acquiring medical assistance    Legal Status: full commitment with Travis Andersen    Disposition: Explore intensive residential treatment services once the patient has the appropriate funding source for this treatment mode.  Awaiting activation of medical assistance.     I again spoke with the patient today regarding the likelihood of utilizing telemedicine as soon as we are able to.  She was in agreement.

## 2020-04-10 NOTE — PLAN OF CARE
Problem: Depressive Symptoms  Goal: Depressive Symptoms  Description: Signs and symptoms of listed problems will be absent or manageable.  Suicidal ideation,   Depression.   Outcome: No Change  Flowsheets (Taken 4/10/2020 1317)  Depressive Symptoms Assessed: all  Depressive Symptoms Present:   insight   other (see comment)   thought process   suicidality  Note: Pt's status remains unchanged. Affect sad, mood depressed. She showed slight improvements her orientation but continues to have memory impairments. Insight poor, judgement impaired. Thought processes lagging but organized. Pt was more withdrawn today - she declined groups and offered very short responses during conversation. Encouraged to journal. Her chronic SI continues but pt stated she is able to push the thoughts away. Med-compliant, ate meals.

## 2020-04-10 NOTE — PLAN OF CARE
Pt presents with sad mood and depressed, hopeless affect. Insight is poor, judgment is impaired. Behavior is isolative and withdrawn and pt is disheveled and untidy. Often staring into space or at the floor. Does not know why she is in the hospital and is extremely confused with her situation. Pt was observed by staff staring at her bed and stated that she saw the bed moving. Pt was very fixated on this. When asked if she was visually hallucinating she denied it. Denies any SI and ate 75% of her meal. Medication compliant, will continue to monitor.

## 2020-04-10 NOTE — PROGRESS NOTES
Assessed pt cognition with MoCA assessment per MD request. Pt scored 12/30, indicating below normal cognition. However, this score does indicate an improvement of 3 points from last Friday (4/3/20) when pt scored 9/30. Pt demo'd small improvements in attention and orientation.

## 2020-04-11 PROCEDURE — 25000132 ZZH RX MED GY IP 250 OP 250 PS 637: Performed by: PSYCHIATRY & NEUROLOGY

## 2020-04-11 PROCEDURE — 12400000 ZZH R&B MH

## 2020-04-11 RX ADMIN — LAMOTRIGINE 200 MG: 100 TABLET ORAL at 09:54

## 2020-04-11 RX ADMIN — MIRTAZAPINE 45 MG: 45 TABLET, FILM COATED ORAL at 21:17

## 2020-04-11 RX ADMIN — VENLAFAXINE HYDROCHLORIDE 225 MG: 150 CAPSULE, EXTENDED RELEASE ORAL at 09:56

## 2020-04-11 RX ADMIN — LITHIUM CARBONATE 450 MG: 450 TABLET, EXTENDED RELEASE ORAL at 21:17

## 2020-04-11 RX ADMIN — BREXPIPRAZOLE 4 MG: 1 TABLET ORAL at 09:52

## 2020-04-11 RX ADMIN — LITHIUM CARBONATE 450 MG: 450 TABLET, EXTENDED RELEASE ORAL at 09:55

## 2020-04-11 RX ADMIN — RAMELTEON 8 MG: 8 TABLET ORAL at 21:17

## 2020-04-11 ASSESSMENT — ACTIVITIES OF DAILY LIVING (ADL)
LAUNDRY: UNABLE TO COMPLETE
HYGIENE/GROOMING: PROMPTS
DRESS: SCRUBS (BEHAVIORAL HEALTH)
ORAL_HYGIENE: PROMPTS

## 2020-04-11 NOTE — PLAN OF CARE
Problem: Depressive Symptoms  Goal: Depressive Symptoms  Description: Signs and symptoms of listed problems will be absent or manageable.  Suicidal ideation,   Depression.   4/11/2020 1334 by Natty Mauricio, RN  Outcome: No Change  Flowsheets (Taken 4/11/2020 1334)  Depressive Symptoms Present:   mood   insight   thought process   memory deficits   affect  Note: flat, sad affect. Appears confused at times and struggles to answer questions. Visible during shift, attended unit programming and also spent time out in lounge watching TV. Ate breakfast and lunch. Denies having any current SI. Med compliant

## 2020-04-11 NOTE — PLAN OF CARE
Pt spent most of the shift in her room. She attended groups and sat in the lounge for a while (all with prompts). Pt did not eat dinner. She only ate some grapes and some crackers. Pt mentioned it has been 2 days she can not get hold of her kids and then I realized she keeps forgetting she has to dial 9 first. She denies SI. Rated depression 5 out of 10.

## 2020-04-12 PROCEDURE — 25000132 ZZH RX MED GY IP 250 OP 250 PS 637: Performed by: PSYCHIATRY & NEUROLOGY

## 2020-04-12 PROCEDURE — 12400000 ZZH R&B MH

## 2020-04-12 RX ADMIN — LITHIUM CARBONATE 450 MG: 450 TABLET, EXTENDED RELEASE ORAL at 09:03

## 2020-04-12 RX ADMIN — LITHIUM CARBONATE 450 MG: 450 TABLET, EXTENDED RELEASE ORAL at 21:09

## 2020-04-12 RX ADMIN — VENLAFAXINE HYDROCHLORIDE 225 MG: 150 CAPSULE, EXTENDED RELEASE ORAL at 09:03

## 2020-04-12 RX ADMIN — LAMOTRIGINE 200 MG: 100 TABLET ORAL at 09:03

## 2020-04-12 RX ADMIN — BREXPIPRAZOLE 4 MG: 1 TABLET ORAL at 09:03

## 2020-04-12 RX ADMIN — RAMELTEON 8 MG: 8 TABLET ORAL at 21:09

## 2020-04-12 RX ADMIN — MIRTAZAPINE 45 MG: 45 TABLET, FILM COATED ORAL at 21:09

## 2020-04-12 ASSESSMENT — MIFFLIN-ST. JEOR: SCORE: 1723.72

## 2020-04-12 NOTE — PLAN OF CARE
"Pt reports chronic SI thoughts and did not specify a plan when asked. When asked what she was thinking while in the milieu she stated \"death\". Her affect was flat/blunt and her mood was depressed. Per pt, major stressors are that she could not get a hold of her son. Other staff member reached out to pt's son and per staff member, they had a good conversation and pt appeared brighter. When asked what year it was, pt was able to state the correct year but did not know what month it is. She remained isolative for the majority of the shift and was only present in the milieu during dinner or due to prompts.   "

## 2020-04-12 NOTE — PLAN OF CARE
"  Problem: Depressive Symptoms  Goal: Depressive Symptoms  Description: Signs and symptoms of listed problems will be absent or manageable.  Suicidal ideation,   Depression.   Outcome: No Change  Flowsheets (Taken 4/12/2020 1301)  Depressive Symptoms Assessed: all  Depressive Symptoms Present:   mood   insight   thought process   affect  Note: Sad affect, endorses feeling down. Reports chronic SI thoughts, but she says that she is trying to \"keep them down\". Appears less confused than yesterday. Visible off and on during shift, no groups, but did spend some time out in lounge watching TV. Ate breakfast and lunch. Showered. Med complian     "

## 2020-04-13 PROCEDURE — 12400000 ZZH R&B MH

## 2020-04-13 PROCEDURE — 99232 SBSQ HOSP IP/OBS MODERATE 35: CPT | Performed by: PSYCHIATRY & NEUROLOGY

## 2020-04-13 PROCEDURE — 25000132 ZZH RX MED GY IP 250 OP 250 PS 637: Performed by: PSYCHIATRY & NEUROLOGY

## 2020-04-13 RX ORDER — DESVENLAFAXINE 25 MG/1
50 TABLET, EXTENDED RELEASE ORAL DAILY
Status: COMPLETED | OUTPATIENT
Start: 2020-04-14 | End: 2020-04-14

## 2020-04-13 RX ORDER — VENLAFAXINE HYDROCHLORIDE 150 MG/1
150 CAPSULE, EXTENDED RELEASE ORAL
Status: DISCONTINUED | OUTPATIENT
Start: 2020-04-14 | End: 2020-04-14

## 2020-04-13 RX ADMIN — RAMELTEON 8 MG: 8 TABLET ORAL at 21:13

## 2020-04-13 RX ADMIN — MIRTAZAPINE 45 MG: 45 TABLET, FILM COATED ORAL at 21:13

## 2020-04-13 RX ADMIN — LITHIUM CARBONATE 450 MG: 450 TABLET, EXTENDED RELEASE ORAL at 09:09

## 2020-04-13 RX ADMIN — LITHIUM CARBONATE 450 MG: 450 TABLET, EXTENDED RELEASE ORAL at 21:11

## 2020-04-13 RX ADMIN — LAMOTRIGINE 200 MG: 100 TABLET ORAL at 09:09

## 2020-04-13 RX ADMIN — VENLAFAXINE HYDROCHLORIDE 225 MG: 150 CAPSULE, EXTENDED RELEASE ORAL at 09:09

## 2020-04-13 RX ADMIN — BREXPIPRAZOLE 4 MG: 1 TABLET ORAL at 09:09

## 2020-04-13 ASSESSMENT — ACTIVITIES OF DAILY LIVING (ADL)
LAUNDRY: WITH SUPERVISION
ORAL_HYGIENE: INDEPENDENT
ORAL_HYGIENE: INDEPENDENT
DRESS: INDEPENDENT
HYGIENE/GROOMING: INDEPENDENT
DRESS: INDEPENDENT
HYGIENE/GROOMING: INDEPENDENT

## 2020-04-13 NOTE — PLAN OF CARE
Problem: Depressive Symptoms  Goal: Depressive Symptoms  Description: Signs and symptoms of listed problems will be absent or manageable.  Suicidal ideation,   Depression.   Flowsheets (Taken 4/13/2020 6788)  Depressive Symptoms Assessed: all  Depressive Symptoms Present:   mood   insight   thought process   suicidality   affect  Note: Patient was withdrawn. Patient endorses suicidal ideation and states that she wants to strangle herself with her hands. Patient appears depressed. Patient stated that she is thinking a lot about death. Patient attended OT group. Patient appears tired and anxious.

## 2020-04-13 NOTE — PLAN OF CARE
"Appears sad/depressed. Reports chronic SI thoughts, did not report any plans, stated \"I'll keep them down\". Thoughts appear disorganized. Spent majority of shift in room. Med compliant.   "

## 2020-04-13 NOTE — PROGRESS NOTES
"[facility]  Psychiatric Progress Note    Length of stay (days): 52        Interim History:   The patient's care was discussed with the treatment team during the daily team meeting and/or staff's chart notes were reviewed.  Staff report: She appeared disoriented and confused over the weekend.  This morning, she appears more depressed and withdrawn.  Continues to endorse suicidal ideation.  No episodes of SIB.  Eating adequately.  Sleeping adequately.    Depression severity scale 0-10 (10=most severe):  Today: 9    The patient was laying in bed and remained in that position throughout the interview.  She was not able to recall our conversation on Friday.  She was mostly passive and quiet throughout the interview.  When questioned regarding stressors she states \"life.\"    Energy is slightly better today however overall poor.  Concentration is limited.  Appetite is normal.  Sleeping well at night.    Suicidal thoughts are present however she contracts for safety on the unit.  She assures me that she would act on the suicidal thoughts if discharged from the hospital today.  No homicidal thoughts reported.    She denied auditory hallucinations today.    Tolerating medications without side effects.    She is agreeable to pursuing residential treatment however does not feel ready enough to transition into that treatment mode yet.           Medications:       brexpiprazole  4 mg Oral Daily     [START ON 4/14/2020] desvenlafaxine succinate  50 mg Oral Daily     lamoTRIgine  200 mg Oral Daily     lithium ER  450 mg Oral Q12H JASMINE     mirtazapine  45 mg Oral At Bedtime     ramelteon  8 mg Oral At Bedtime     [START ON 4/14/2020] venlafaxine  150 mg Oral Daily with breakfast          Allergies:   No Known Allergies       Labs:     No results found for this or any previous visit (from the past 24 hour(s)).       Psychiatric Examination:     /80   Pulse 81   Temp 98.7  F (37.1  C) (Oral)   Resp 16   Ht " "1.651 m (5' 5\")   Wt 103.3 kg (227 lb 11.2 oz)   SpO2 98%   BMI 37.89 kg/m    Weight is 227 lbs 11.2 oz  Body mass index is 37.89 kg/m .  Orthostatic Vitals     None            Appearance: awake, alert  Attitude:  cooperative  Eye Contact:  fair  Mood:  depressed  Affect:  mood congruent  Speech:  decreased prosody  Psychomotor Behavior:  no evidence of tardive dyskinesia, dystonia, or tics  Throught Process:  linear  Associations:  no loose associations  Thought Content:  no evidence of psychotic thought and active suicidal ideation present  Insight:  partial  Judgement:  fair  Oriented to:  time, person, and place  Attention Span and Concentration:  fair  Recent and Remote Memory:  intact    Clinical Global Impressions  First:     Most recent:            Precautions:     Behavioral Orders   Procedures     Code 1 - Restrict to Unit     Fall precautions     Fall precautions     Robles Gordon     Routine Programming     As clinically indicated     Status 15     Every 15 minutes.          DIagnoses:     Major depressive disorder-recurrent, severe with psychotic features  Generalized anxiety disorder  Suspect borderline personality disorder         Plan:     Continue augmentation with mirtazapine.  Tolerating the higher dose of Rexulti.  Monitoring response aimed at reducing psychosis.  Continue lithium at the current dose.  Serum lithium level was recently 0.85 and stable since the last level.  [employer] testing results from January 2020 were available for review today.  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  Which could affect the effectiveness of the venlafaxine.  We will plan to switch to Pristiq to minimize barriers at achieving therapeutic dosing.  Gradually taper off of the Effexor as tolerated.    ECT:  discontinued due to worsening cognitive side effects.  She has likely maximized benefits from her current treatment course.  --Monitoring [place] assessments:       9 on April 3, " 2020.        12 on April 10, 2020    --Travis Andersen was approved to resume an acute series 3 times a week however we will not proceed with utilizing the order at this time due to the reason mentioned above.  The order will be active if needed in the future.    Psychosocial treatments to be addressed with social work consult and groups.    --Consider neuropsychological testing to explore underlying personality characteristics that could be complicating her treatment course.  Once her cognition has improved, consider obtaining testing.  --Our  will assist the patient in working with her , family members, and our business office to assist her in acquiring medical assistance    Legal Status: full commitment with Travis Andersen    Disposition: Explore intensive residential treatment services once the patient has the appropriate funding source for this treatment mode.  Awaiting activation of medical assistance.

## 2020-04-14 PROCEDURE — 25000132 ZZH RX MED GY IP 250 OP 250 PS 637: Performed by: PSYCHIATRY & NEUROLOGY

## 2020-04-14 PROCEDURE — 99232 SBSQ HOSP IP/OBS MODERATE 35: CPT | Performed by: PSYCHIATRY & NEUROLOGY

## 2020-04-14 PROCEDURE — 12400000 ZZH R&B MH

## 2020-04-14 RX ORDER — VENLAFAXINE HYDROCHLORIDE 75 MG/1
75 CAPSULE, EXTENDED RELEASE ORAL
Status: DISCONTINUED | OUTPATIENT
Start: 2020-04-15 | End: 2020-04-20

## 2020-04-14 RX ADMIN — LAMOTRIGINE 200 MG: 100 TABLET ORAL at 08:25

## 2020-04-14 RX ADMIN — LITHIUM CARBONATE 450 MG: 450 TABLET, EXTENDED RELEASE ORAL at 20:21

## 2020-04-14 RX ADMIN — LITHIUM CARBONATE 450 MG: 450 TABLET, EXTENDED RELEASE ORAL at 08:24

## 2020-04-14 RX ADMIN — RAMELTEON 8 MG: 8 TABLET ORAL at 20:21

## 2020-04-14 RX ADMIN — VENLAFAXINE HYDROCHLORIDE 150 MG: 150 CAPSULE, EXTENDED RELEASE ORAL at 08:25

## 2020-04-14 RX ADMIN — DESVENLAFAXINE SUCCINATE 50 MG: 25 TABLET, EXTENDED RELEASE ORAL at 08:25

## 2020-04-14 RX ADMIN — BREXPIPRAZOLE 4 MG: 1 TABLET ORAL at 08:24

## 2020-04-14 RX ADMIN — MIRTAZAPINE 45 MG: 45 TABLET, FILM COATED ORAL at 20:21

## 2020-04-14 ASSESSMENT — MIFFLIN-ST. JEOR: SCORE: 1724.63

## 2020-04-14 ASSESSMENT — ACTIVITIES OF DAILY LIVING (ADL)
DRESS: INDEPENDENT
HYGIENE/GROOMING: INDEPENDENT

## 2020-04-14 NOTE — PLAN OF CARE
"Pt presenting with depressed, flat affect, appears to be improving cognitively, oriented x4 and able to recall conversation with MD yesterday. Pt states she has constant thoughts of not wanting to live but no specific suicide plan at this time. Withdrawn, isolating to room and bedresting most of shift, declined attending groups but ate meals in lounge with peers. Changed bed linens and showered with some encouragement from staff. \"I just want to go home\".   "
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
## 2020-04-14 NOTE — PROGRESS NOTES
"[facility]  Psychiatric Progress Note    Length of stay (days): 53        Interim History:   The patient's care was discussed with the treatment team during the daily team meeting and/or staff's chart notes were reviewed.  Staff report: She appeared disoriented and confused over the weekend.  This morning, she appears more depressed and withdrawn.  Continues to endorse suicidal ideation.  No episodes of SIB.  Eating adequately.  Sleeping adequately.    Depression severity scale 0-10 (10=most severe):  Today: 9    The patient passively implied that she recalled our conversation from yesterday however was not able to provide details when elicited.    She reports that her mood is quite depressed.  She requested that I send her home so that she may proceed with committing suicide.  On further discussion, she reports that her mood has been more depressed because she is not able to speak with her children.  She has apparently tried to call them a few times and they have not answered their phones.  She feels rejected by them and quickly engages in a negative thought process.    Energy remains low.  Concentration is limited.  Appetite is normal.  Sleeping well at night.    She denied auditory hallucinations today.    Tolerating medications without side effects.    She is agreeable to pursuing residential treatment however does not feel ready enough to transition into that treatment mode yet.           Medications:       brexpiprazole  4 mg Oral Daily     lamoTRIgine  200 mg Oral Daily     lithium ER  450 mg Oral Q12H JASMINE     mirtazapine  45 mg Oral At Bedtime     ramelteon  8 mg Oral At Bedtime     venlafaxine  150 mg Oral Daily with breakfast          Allergies:   No Known Allergies       Labs:     No results found for this or any previous visit (from the past 24 hour(s)).       Psychiatric Examination:     /78   Pulse 90   Temp 98.4  F (36.9  C) (Oral)   Resp 16   Ht 1.651 m (5' 5\")   Wt 103.4 " kg (227 lb 14.4 oz)   SpO2 100%   BMI 37.92 kg/m    Weight is 227 lbs 14.4 oz  Body mass index is 37.92 kg/m .  Orthostatic Vitals     None            Appearance: awake, alert  Attitude:  cooperative  Eye Contact:  fair  Mood:  depressed  Affect:  mood congruent  Speech:  decreased prosody  Psychomotor Behavior:  no evidence of tardive dyskinesia, dystonia, or tics  Throught Process:  linear  Associations:  no loose associations  Thought Content:  no evidence of psychotic thought and active suicidal ideation present  Insight:  partial  Judgement:  fair  Oriented to:  time, person, and place  Attention Span and Concentration:  fair  Recent and Remote Memory:  intact    Clinical Global Impressions  First:     Most recent:            Precautions:     Behavioral Orders   Procedures     Code 1 - Restrict to Unit     Fall precautions     Fall precautions     Travis Woodsd     Routine Programming     As clinically indicated     Status 15     Every 15 minutes.          DIagnoses:     Major depressive disorder-recurrent, severe with psychotic features  Generalized anxiety disorder  Suspect borderline personality disorder         Plan:     Continue augmentation with mirtazapine.  Tolerating the higher dose of Rexulti.  Monitoring response aimed at reducing psychosis.  Continue lithium at the current dose.  Serum lithium level was recently 0.85 and stable since the last level.  Continue to titrate up Pristiq while tapering off of Effexor for antidepressant treatment.    ECT:  discontinued due to worsening cognitive side effects.  She has likely maximized benefits from her current treatment course.  --Monitoring [place] assessments:       9 on April 3, 2020.        12 on April 10, 2020    --Travis Andersen was approved to resume an acute series 3 times a week however we will not proceed with utilizing the order at this time due to the reason mentioned above.  The order will be active if needed in the future.    Psychosocial  treatments to be addressed with social work consult and groups.    --Consider neuropsychological testing to explore underlying personality characteristics that could be complicating her treatment course.  Once her cognition has improved, consider obtaining testing.  --Our  will assist the patient in working with her , family members, and our business office to assist her in acquiring medical assistance    Legal Status: full commitment with Travis Andersen    Disposition: Explore intensive residential treatment services once the patient has the appropriate funding source for this treatment mode.  Medical assistance is now active.

## 2020-04-15 PROCEDURE — 25000132 ZZH RX MED GY IP 250 OP 250 PS 637: Performed by: PSYCHIATRY & NEUROLOGY

## 2020-04-15 PROCEDURE — G0177 OPPS/PHP; TRAIN & EDUC SERV: HCPCS

## 2020-04-15 PROCEDURE — 12400000 ZZH R&B MH

## 2020-04-15 RX ADMIN — LITHIUM CARBONATE 450 MG: 450 TABLET, EXTENDED RELEASE ORAL at 08:25

## 2020-04-15 RX ADMIN — MIRTAZAPINE 45 MG: 45 TABLET, FILM COATED ORAL at 20:41

## 2020-04-15 RX ADMIN — LITHIUM CARBONATE 450 MG: 450 TABLET, EXTENDED RELEASE ORAL at 20:41

## 2020-04-15 RX ADMIN — BREXPIPRAZOLE 4 MG: 1 TABLET ORAL at 08:25

## 2020-04-15 RX ADMIN — RAMELTEON 8 MG: 8 TABLET ORAL at 20:41

## 2020-04-15 RX ADMIN — LAMOTRIGINE 200 MG: 100 TABLET ORAL at 08:25

## 2020-04-15 RX ADMIN — VENLAFAXINE HYDROCHLORIDE 75 MG: 75 CAPSULE, EXTENDED RELEASE ORAL at 08:25

## 2020-04-15 RX ADMIN — HYDROXYZINE HYDROCHLORIDE 50 MG: 25 TABLET, FILM COATED ORAL at 17:27

## 2020-04-15 ASSESSMENT — ACTIVITIES OF DAILY LIVING (ADL)
ORAL_HYGIENE: INDEPENDENT
HYGIENE/GROOMING: INDEPENDENT
LAUNDRY: WITH SUPERVISION
HYGIENE/GROOMING: INDEPENDENT
DRESS: SCRUBS (BEHAVIORAL HEALTH)
DRESS: SCRUBS (BEHAVIORAL HEALTH)
ORAL_HYGIENE: INDEPENDENT
LAUNDRY: WITH SUPERVISION

## 2020-04-15 NOTE — PROGRESS NOTES
"Pt continues to be isolative and withdrawn, although will attend groups with encouragement. Pt said, \"I have been having ups and downs today.\"  Pt said that distraction helps her to cope.\" Pt was encouraged to be around others, attend groups, work on a puzzle or talk with staff. Eye contact was poor during 1:1. Pt denied SI, affect sad, depressed, mood calm.  "

## 2020-04-15 NOTE — PLAN OF CARE
Pt  attended 1 of 2 OT groups today. Pt transitioned to OT group with MIN encouragement and engaged in therapeutic activity addressing functional cognition and interpersonal skills with direct VCs. With direct VCs, pt participated in therapeutic group activity and discussion addressing healthy habits. Educated pt on healthy habits of sleep hygiene, diet, exercise, and social/leisure engagement and how these impact mental health. Pt ID'd going to bed earlier at home as a health change she plans to make for improved wellness. Pt will continue to benefit from OT intervention to address implementation of positive functional coping skills, role performance, and community reintegration.

## 2020-04-15 NOTE — PLAN OF CARE
Patient is A&O, presents as depressed in mood, with congruent affect. Speech is soft. Patient's thought process is improving, with the ability to recall short- term events. She has been visible on the unit, but minimally social. Patient endorses chronic suicidal ideation with no specific plan. Says she is depressed because she is not able to speak with her children. She had a difficult time reaching her son by phone. Patient also denies any hallucinations. Nursing will continue to monitor for safety.

## 2020-04-16 PROCEDURE — 12400000 ZZH R&B MH

## 2020-04-16 PROCEDURE — 25000132 ZZH RX MED GY IP 250 OP 250 PS 637: Performed by: PSYCHIATRY & NEUROLOGY

## 2020-04-16 PROCEDURE — 99233 SBSQ HOSP IP/OBS HIGH 50: CPT | Performed by: PSYCHIATRY & NEUROLOGY

## 2020-04-16 RX ORDER — DESVENLAFAXINE 25 MG/1
50 TABLET, EXTENDED RELEASE ORAL DAILY
Status: DISCONTINUED | OUTPATIENT
Start: 2020-04-16 | End: 2020-04-20

## 2020-04-16 RX ADMIN — HYDROXYZINE HYDROCHLORIDE 50 MG: 25 TABLET, FILM COATED ORAL at 09:19

## 2020-04-16 RX ADMIN — MIRTAZAPINE 45 MG: 45 TABLET, FILM COATED ORAL at 20:44

## 2020-04-16 RX ADMIN — RAMELTEON 8 MG: 8 TABLET ORAL at 20:44

## 2020-04-16 RX ADMIN — LITHIUM CARBONATE 450 MG: 450 TABLET, EXTENDED RELEASE ORAL at 20:44

## 2020-04-16 RX ADMIN — LAMOTRIGINE 200 MG: 100 TABLET ORAL at 09:18

## 2020-04-16 RX ADMIN — DESVENLAFAXINE SUCCINATE 50 MG: 25 TABLET, EXTENDED RELEASE ORAL at 13:09

## 2020-04-16 RX ADMIN — BREXPIPRAZOLE 4 MG: 1 TABLET ORAL at 09:18

## 2020-04-16 RX ADMIN — VENLAFAXINE HYDROCHLORIDE 75 MG: 75 CAPSULE, EXTENDED RELEASE ORAL at 09:18

## 2020-04-16 RX ADMIN — LITHIUM CARBONATE 450 MG: 450 TABLET, EXTENDED RELEASE ORAL at 09:18

## 2020-04-16 ASSESSMENT — MIFFLIN-ST. JEOR: SCORE: 1722.36

## 2020-04-16 ASSESSMENT — ACTIVITIES OF DAILY LIVING (ADL)
HYGIENE/GROOMING: INDEPENDENT
DRESS: INDEPENDENT
ORAL_HYGIENE: INDEPENDENT
LAUNDRY: WITH SUPERVISION
HYGIENE/GROOMING: INDEPENDENT
ORAL_HYGIENE: INDEPENDENT
DRESS: INDEPENDENT

## 2020-04-16 NOTE — PLAN OF CARE
"Problem: Suicidal Behavior  Goal: Suicidal Behavior is Absent or Managed  Outcome: No Change  Flowsheets (Taken 4/7/2020 1518 by Nayeli Pradhan RN)  Mutually Determined Action Steps (Provide Immediate/Ongoing Protective Physical Environment): shares suicidal thoughts  Note: Pt with depressed mood, sad affect, quiet/whispering tone. Pt stated that she is \"not so good\" and said that she did not sleep well last night and has been unable to speak to her children today (ultimately able to reach one of her children later on). During discussion, pt endorsed chronic thought that \"I could strangle myself with my hands\", without a specific plan. Pt encouraged to use distractions and did walk in halls and attended one group. Pt also appeared restless and anxious, PRN Atarax given with reported decrease in anxiety after this. Mostly isolative and withdrawn. Denies AH. Med compliant.        "

## 2020-04-16 NOTE — PROGRESS NOTES
"[facility]  Psychiatric Progress Note    Length of stay (days): 55        Interim History:   The patient's care was discussed with the treatment team during the daily team meeting and/or staff's chart notes were reviewed.  Staff report: She appeared disoriented and confused over the weekend.  This morning, she appears more depressed and withdrawn.  Continues to endorse suicidal ideation.  No episodes of SIB.  Eating adequately.  Sleeping adequately.    Depression severity scale 0-10 (10=most severe):  Today: 9    The patient passively implied that she recalled our conversation from yesterday however was not able to provide details when elicited.    She reports that her mood is quite depressed.  She requested that I send her home so that she may proceed with committing suicide.  She was able to contract for safety on the unit.    Energy remains low.  Concentration is limited.  Appetite is normal.  Sleeping well at night.    She denied auditory hallucinations today.    Tolerating medications without side effects.    She is agreeable to pursuing residential treatment however does not feel ready enough to transition into that treatment mode yet.           Medications:       brexpiprazole  4 mg Oral Daily     desvenlafaxine succinate  50 mg Oral Daily     lamoTRIgine  200 mg Oral Daily     lithium ER  450 mg Oral Q12H JASMINE     mirtazapine  45 mg Oral At Bedtime     ramelteon  8 mg Oral At Bedtime     venlafaxine  75 mg Oral Daily with breakfast          Allergies:   No Known Allergies       Labs:     No results found for this or any previous visit (from the past 24 hour(s)).       Psychiatric Examination:     /79   Pulse 75   Temp 98.3  F (36.8  C) (Oral)   Resp 15   Ht 1.651 m (5' 5\")   Wt 103.1 kg (227 lb 6.4 oz)   SpO2 97%   BMI 37.84 kg/m    Weight is 227 lbs 6.4 oz  Body mass index is 37.84 kg/m .  Orthostatic Vitals     None            Appearance: awake, alert  Attitude:  " cooperative  Eye Contact:  fair  Mood:  depressed  Affect:  mood congruent  Speech:  decreased prosody  Psychomotor Behavior:  no evidence of tardive dyskinesia, dystonia, or tics  Throught Process:  linear  Associations:  no loose associations  Thought Content:  no evidence of psychotic thought and active suicidal ideation present  Insight:  partial  Judgement:  fair  Oriented to:  time, person, and place  Attention Span and Concentration:  fair  Recent and Remote Memory:  intact    Clinical Global Impressions  First:     Most recent:            Precautions:     Behavioral Orders   Procedures     Code 1 - Restrict to Unit     Fall precautions     Fall precautions     Travis Gordon     Routine Programming     As clinically indicated     Status 15     Every 15 minutes.          DIagnoses:     Major depressive disorder-recurrent, severe with psychotic features  Generalized anxiety disorder  Suspect borderline personality disorder         Plan:     Continue augmentation with mirtazapine.  Tolerating the higher dose of Rexulti.  Monitoring response aimed at reducing psychosis.  Continue lithium at the current dose.  Serum lithium level was recently 0.85 and stable since the last level.  Continue to titrate up Pristiq while tapering off of Effexor for antidepressant treatment.    ECT:  discontinued due to worsening cognitive side effects.  She has likely maximized benefits from her current treatment course.  --Monitoring [place] assessments:       9 on April 3, 2020.        12 on April 10, 2020    --Travis Andersen was approved to resume an acute series 3 times a week however we will not proceed with utilizing the order at this time due to the reason mentioned above.  The order will be active if needed in the future.    Psychosocial treatments to be addressed with social work consult and groups.    --Consider neuropsychological testing to explore underlying personality characteristics that could be complicating her treatment  course.  Once her cognition has improved, consider obtaining testing.  --Our  will assist the patient in working with her , family members, and our business office to assist her in acquiring medical assistance    Legal Status: full commitment with Travis Andersen    Disposition: Explore intensive residential treatment services once the patient has the appropriate funding source for this treatment mode.  Medical assistance is now active.

## 2020-04-16 NOTE — PLAN OF CARE
"  Problem: Depressive Symptoms  Goal: Depressive Symptoms  Description: Signs and symptoms of listed problems will be absent or manageable.  Suicidal ideation,   Depression.   Outcome: No Change  Flowsheets  Taken 4/13/2020 1437 by Quan Hester  Depressive Symptoms Assessed: all  Taken 4/16/2020 1433 by Ivana Serrano  Depressive Symptoms Present:   suicidality   affect   mood   insight   thought process  Note: Pt presents with a flat affect and sad mood. Pt thought process is impaired and insight is poor. Pt is withdrawn to her room, bed resting while staring off into space, for most of the shift. Pt endorses Si with no specific plan, stating, \"I just want to kill myself\". Pt talks very quietly and often needs to be asked to speak louder. Pt ate all of her breakfast and lunch.     "

## 2020-04-17 PROCEDURE — 25000132 ZZH RX MED GY IP 250 OP 250 PS 637: Performed by: PSYCHIATRY & NEUROLOGY

## 2020-04-17 PROCEDURE — 12400000 ZZH R&B MH

## 2020-04-17 PROCEDURE — 99232 SBSQ HOSP IP/OBS MODERATE 35: CPT | Performed by: PSYCHIATRY & NEUROLOGY

## 2020-04-17 RX ADMIN — LITHIUM CARBONATE 450 MG: 450 TABLET, EXTENDED RELEASE ORAL at 21:13

## 2020-04-17 RX ADMIN — LAMOTRIGINE 200 MG: 100 TABLET ORAL at 08:29

## 2020-04-17 RX ADMIN — BREXPIPRAZOLE 4 MG: 1 TABLET ORAL at 08:29

## 2020-04-17 RX ADMIN — MIRTAZAPINE 45 MG: 45 TABLET, FILM COATED ORAL at 21:13

## 2020-04-17 RX ADMIN — RAMELTEON 8 MG: 8 TABLET ORAL at 21:13

## 2020-04-17 RX ADMIN — DESVENLAFAXINE SUCCINATE 50 MG: 25 TABLET, EXTENDED RELEASE ORAL at 08:29

## 2020-04-17 RX ADMIN — LITHIUM CARBONATE 450 MG: 450 TABLET, EXTENDED RELEASE ORAL at 08:29

## 2020-04-17 RX ADMIN — VENLAFAXINE HYDROCHLORIDE 75 MG: 75 CAPSULE, EXTENDED RELEASE ORAL at 08:29

## 2020-04-17 ASSESSMENT — ACTIVITIES OF DAILY LIVING (ADL)
LAUNDRY: WITH SUPERVISION
HYGIENE/GROOMING: INDEPENDENT
HYGIENE/GROOMING: SHOWER
DRESS: SCRUBS (BEHAVIORAL HEALTH)
ORAL_HYGIENE: INDEPENDENT

## 2020-04-17 NOTE — PLAN OF CARE
"Pt transitioned to PM mindfulness group with MIN encouragement and participated in a mindfulness group focusing on reduction of anxiety, improvement of mood, and increase in concentration. The mindfulness practice was offered via supportive approaches including seated body scan and mindful listening. Pt reported both practices as \"relaxing.\" Pt will continue to benefit from OT intervention to address implementation of positive functional coping skills, role performance, and community reintegration.     "

## 2020-04-17 NOTE — PLAN OF CARE
RODOLFO for SI/SIB as pt was asleep before I could check in with her. For the majority of the evening shift pt remained awake in bed. When prompted to attend groups or to be present in the milieu, pt declined. Her affect appear depressed and her mood was calm.

## 2020-04-17 NOTE — PROGRESS NOTES
I've been in contact with [facility] IRT's as recently as yesterday.   They have pt on their list and they are waiting until Dr Laughlin says pt's cognition has improved sufficiently enough to be discharged to an IRT's.   There is another cognition test  ([place]) that will be done today with our Occupational therapist.

## 2020-04-17 NOTE — PLAN OF CARE
"7a-3pm: Affect flat, blunted. Withdrawn and isolative. Reported feeling depressed and hopeless. Ate meals in lounge. Declined groups in AM, but attended mindful music in afternoon. Otherwise laid in bed. Required heavy encouragement from RN to complete shower. Speech quiet and slow to respond. Endorsed thoughts of suicide by \"strangling myself\". Stated \"my kids would be relieved they don't have to look out for me\" and \"I don't have any good days anymore\". Denied VAH and compliant with medications.    3-7pm: Spent afternoon lying in bed and in lounge. Did not attempt to interact with peers or staff, but would respond when spoken to. Affect remains flat, blunted. Insight is poor with impaired thought process. Mood calm but depressed. Stated she would attend group this evening. Continues to endorse SI, but denies VAH. Medication compliant.   "

## 2020-04-17 NOTE — PROGRESS NOTES
Assessed pt cognition with MoCA assessment per MD request. Pt scored 15/30, indicating below normal cognition. However, this score does indicate an improvement of 3 points from last Friday (4/10/20) when pt scored 12/30. Pt demo'd small improvements in executive function and attention.

## 2020-04-17 NOTE — PROGRESS NOTES
"[facility]  Psychiatric Progress Note    Length of stay (days): 56        Interim History:   The patient's care was discussed with the treatment team during the daily team meeting and/or staff's chart notes were reviewed.  Staff report: Continues to endorse suicidal ideation.  No episodes of SIB.  Eating adequately.  Sleeping adequately.    Depression severity scale 0-10 (10=most severe):  Today: 9    She could partly remember our conversation from yesterday.     She reports that her mood is quite depressed.    She requested that I send her home so that she may proceed with committing suicide.  She mentions various plans including cutting, overdosing, or strangulating herself.     She talked a bit more today about her children. She misses them however feels that they will be \"okay without me\" reminiscing the outcome if she commits suicide.    Energy remains low. Concentration is limited. Appetite is normal. Sleeping well at night.    She denied auditory hallucinations today.    Tolerating medications without side effects.    She is agreeable to pursuing residential treatment however does not feel ready enough to transition into that treatment mode yet.           Medications:       brexpiprazole  4 mg Oral Daily     desvenlafaxine succinate  50 mg Oral Daily     lamoTRIgine  200 mg Oral Daily     lithium ER  450 mg Oral Q12H JASMINE     mirtazapine  45 mg Oral At Bedtime     ramelteon  8 mg Oral At Bedtime     venlafaxine  75 mg Oral Daily with breakfast          Allergies:   No Known Allergies       Labs:     No results found for this or any previous visit (from the past 24 hour(s)).       Psychiatric Examination:     /84   Pulse 75   Temp 97.7  F (36.5  C) (Oral)   Resp 16   Ht 1.651 m (5' 5\")   Wt 103.1 kg (227 lb 6.4 oz)   SpO2 100%   BMI 37.84 kg/m    Weight is 227 lbs 6.4 oz  Body mass index is 37.84 kg/m .  Orthostatic Vitals     None            Appearance: awake, alert  Attitude:  " cooperative  Eye Contact:  fair  Mood:  depressed  Affect:  mood congruent  Speech:  decreased prosody  Psychomotor Behavior:  no evidence of tardive dyskinesia, dystonia, or tics  Throught Process:  linear  Associations:  no loose associations  Thought Content:  no evidence of psychotic thought and active suicidal ideation present  Insight:  partial  Judgement:  fair  Oriented to:  time, person, and place  Attention Span and Concentration:  fair  Recent and Remote Memory:  intact    Clinical Global Impressions  First:     Most recent:            Precautions:     Behavioral Orders   Procedures     Code 1 - Restrict to Unit     Fall precautions     Fall precautions     Travis Gordon     Routine Programming     As clinically indicated     Status 15     Every 15 minutes.          DIagnoses:     Major depressive disorder-recurrent, severe with psychotic features  Generalized anxiety disorder  Suspect borderline personality disorder         Plan:     Continue augmentation with mirtazapine.  Tolerating the higher dose of Rexulti.  Monitoring response aimed at reducing psychosis.  Continue lithium at the current dose.  Serum lithium level was recently 0.85 and stable since the last level.  Continue to titrate up Pristiq while tapering off of Effexor for antidepressant treatment. Plan to increase the dose to 100mg next week.     ECT:  discontinued due to worsening cognitive side effects.  She has likely maximized benefits from her current treatment course.  --Monitoring [place] assessments:         9 on April 3, 2020.        12 on April 10, 2020      15 on April 17, 2020    --Travis Andersen was approved to resume an acute series 3 times a week however we will not proceed with utilizing the order at this time due to the reason mentioned above.  The order will be active if needed in the future.    Psychosocial treatments to be addressed with social work consult and groups.    --Consider neuropsychological testing to explore  underlying personality characteristics that could be complicating her treatment course.  Once her cognition has improved, consider obtaining testing.  --Our  will assist the patient in working with her , family members, and our business office to assist her in acquiring medical assistance    Legal Status: full commitment with Travis Andersen    Disposition: Explore intensive residential treatment services once the patient has the appropriate funding source for this treatment mode.  Medical assistance is now active.

## 2020-04-18 PROCEDURE — 25000132 ZZH RX MED GY IP 250 OP 250 PS 637: Performed by: PSYCHIATRY & NEUROLOGY

## 2020-04-18 PROCEDURE — 12400000 ZZH R&B MH

## 2020-04-18 RX ADMIN — MIRTAZAPINE 45 MG: 45 TABLET, FILM COATED ORAL at 20:50

## 2020-04-18 RX ADMIN — RAMELTEON 8 MG: 8 TABLET ORAL at 20:50

## 2020-04-18 RX ADMIN — DESVENLAFAXINE SUCCINATE 50 MG: 25 TABLET, EXTENDED RELEASE ORAL at 09:07

## 2020-04-18 RX ADMIN — VENLAFAXINE HYDROCHLORIDE 75 MG: 75 CAPSULE, EXTENDED RELEASE ORAL at 09:08

## 2020-04-18 RX ADMIN — ACETAMINOPHEN 650 MG: 325 TABLET, FILM COATED ORAL at 20:50

## 2020-04-18 RX ADMIN — LITHIUM CARBONATE 450 MG: 450 TABLET, EXTENDED RELEASE ORAL at 20:50

## 2020-04-18 RX ADMIN — LAMOTRIGINE 200 MG: 100 TABLET ORAL at 09:08

## 2020-04-18 RX ADMIN — LITHIUM CARBONATE 450 MG: 450 TABLET, EXTENDED RELEASE ORAL at 09:08

## 2020-04-18 RX ADMIN — BREXPIPRAZOLE 4 MG: 1 TABLET ORAL at 09:08

## 2020-04-18 ASSESSMENT — ACTIVITIES OF DAILY LIVING (ADL)
HYGIENE/GROOMING: INDEPENDENT
DRESS: INDEPENDENT
HYGIENE/GROOMING: SHOWER
ORAL_HYGIENE: INDEPENDENT
DRESS: INDEPENDENT
ORAL_HYGIENE: INDEPENDENT
LAUNDRY: WITH SUPERVISION
LAUNDRY: WITH SUPERVISION

## 2020-04-18 NOTE — PLAN OF CARE
"3-7pm: Pt spend afternoon sitting up in lounge watching movies/eating dinner with peers. Did not engage unless directly spoken to. Reported talking to eldest son last night, stating \"It was nice to talk to him\". Also reported briefly enjoying speaking with youngest son this AM. Continues to be withdrawn and depressed. Thought process impaired with poor judgement. Endorsed lingering SI; \"I think about strangling myself\" when questioned about method. Denied VAH. Compliant with medications.   "

## 2020-04-18 NOTE — PLAN OF CARE
Problem: Suicidal Behavior  Goal: Suicidal Behavior is Absent or Managed  Flowsheets  Taken 4/7/2020 1518 by Nayeli Pradhan, RN  Mutually Determined Action Steps (Provide Immediate/Ongoing Protective Physical Environment): shares suicidal thoughts  Taken 4/18/2020 1405 by Maykel Peck RN  Individualized Action Step (Provide Immediate/Ongoing Protective Physical Environment): Will continue 15 mins check and random checks.  Note: Pt appears depressed, isolated and withdrawn, bed resting almost entire shift except coming out for meals. Pt affect is flat with poor insight. During 1;1 pt would hardly say much and either say yes or no and when prompted for further elaboration she goes mute. Judgment is impaired. Pt has been med compliant.Pt ate all of her breakfast but ate just the fruits at lunch. Pt continues to endorse chronic SI but does not know how to do it here.

## 2020-04-19 PROCEDURE — 12400000 ZZH R&B MH

## 2020-04-19 PROCEDURE — 25000132 ZZH RX MED GY IP 250 OP 250 PS 637: Performed by: PSYCHIATRY & NEUROLOGY

## 2020-04-19 RX ADMIN — LITHIUM CARBONATE 450 MG: 450 TABLET, EXTENDED RELEASE ORAL at 21:23

## 2020-04-19 RX ADMIN — DESVENLAFAXINE SUCCINATE 50 MG: 25 TABLET, EXTENDED RELEASE ORAL at 09:09

## 2020-04-19 RX ADMIN — LAMOTRIGINE 200 MG: 100 TABLET ORAL at 09:09

## 2020-04-19 RX ADMIN — ACETAMINOPHEN 650 MG: 325 TABLET, FILM COATED ORAL at 17:16

## 2020-04-19 RX ADMIN — BREXPIPRAZOLE 4 MG: 1 TABLET ORAL at 09:09

## 2020-04-19 RX ADMIN — VENLAFAXINE HYDROCHLORIDE 75 MG: 75 CAPSULE, EXTENDED RELEASE ORAL at 09:09

## 2020-04-19 RX ADMIN — MIRTAZAPINE 45 MG: 45 TABLET, FILM COATED ORAL at 21:24

## 2020-04-19 RX ADMIN — RAMELTEON 8 MG: 8 TABLET ORAL at 21:24

## 2020-04-19 RX ADMIN — LITHIUM CARBONATE 450 MG: 450 TABLET, EXTENDED RELEASE ORAL at 09:09

## 2020-04-19 ASSESSMENT — ACTIVITIES OF DAILY LIVING (ADL)
HYGIENE/GROOMING: INDEPENDENT
HYGIENE/GROOMING: INDEPENDENT
DRESS: SCRUBS (BEHAVIORAL HEALTH);INDEPENDENT
ORAL_HYGIENE: INDEPENDENT
ORAL_HYGIENE: INDEPENDENT
DRESS: SCRUBS (BEHAVIORAL HEALTH)
LAUNDRY: WITH SUPERVISION

## 2020-04-19 ASSESSMENT — MIFFLIN-ST. JEOR: SCORE: 1728.26

## 2020-04-19 NOTE — PLAN OF CARE
Pt continues to be depressed with chronic suicidal thoughts. Pt has thoughts of strangulation or starvation, choosing very little food on supper tray (supper was fruit cup , hot chocolate). Pt's admission weight was 100.47 Kg, and this week is 103.73 Kg, so she is holding her weight adequately with some gain.  Pt has been in the lounge this afternoon, states to distract herself; staff affirmed this as a coping skill. Pt spoke to her son and her older son on the phone. Pt received Tylenol for headache.

## 2020-04-19 NOTE — PLAN OF CARE
patent encouraged to stay out of her room this shift. She spent majority of the shift in the lounge. Attended one group. Withdrawn. Mood is depressed. Affect is flat, blunt, sad. Continues to endorse chronic thoughts of SI w plan to strangle self. States she is feeling better now that she has contacted her son.

## 2020-04-20 PROCEDURE — 25000132 ZZH RX MED GY IP 250 OP 250 PS 637: Performed by: PSYCHIATRY & NEUROLOGY

## 2020-04-20 PROCEDURE — 99232 SBSQ HOSP IP/OBS MODERATE 35: CPT | Mod: 95 | Performed by: PSYCHIATRY & NEUROLOGY

## 2020-04-20 PROCEDURE — 12400000 ZZH R&B MH

## 2020-04-20 RX ORDER — DESVENLAFAXINE 25 MG/1
100 TABLET, EXTENDED RELEASE ORAL DAILY
Status: DISCONTINUED | OUTPATIENT
Start: 2020-04-21 | End: 2020-04-25 | Stop reason: HOSPADM

## 2020-04-20 RX ORDER — VENLAFAXINE HYDROCHLORIDE 37.5 MG/1
37.5 CAPSULE, EXTENDED RELEASE ORAL
Status: COMPLETED | OUTPATIENT
Start: 2020-04-21 | End: 2020-04-21

## 2020-04-20 RX ADMIN — LITHIUM CARBONATE 450 MG: 450 TABLET, EXTENDED RELEASE ORAL at 21:01

## 2020-04-20 RX ADMIN — LITHIUM CARBONATE 450 MG: 450 TABLET, EXTENDED RELEASE ORAL at 08:57

## 2020-04-20 RX ADMIN — HYDROXYZINE HYDROCHLORIDE 50 MG: 25 TABLET, FILM COATED ORAL at 14:54

## 2020-04-20 RX ADMIN — MIRTAZAPINE 45 MG: 45 TABLET, FILM COATED ORAL at 21:01

## 2020-04-20 RX ADMIN — ACETAMINOPHEN 650 MG: 325 TABLET, FILM COATED ORAL at 14:54

## 2020-04-20 RX ADMIN — LAMOTRIGINE 200 MG: 100 TABLET ORAL at 08:57

## 2020-04-20 RX ADMIN — VENLAFAXINE HYDROCHLORIDE 75 MG: 75 CAPSULE, EXTENDED RELEASE ORAL at 08:57

## 2020-04-20 RX ADMIN — RAMELTEON 8 MG: 8 TABLET ORAL at 21:01

## 2020-04-20 RX ADMIN — DESVENLAFAXINE SUCCINATE 50 MG: 25 TABLET, EXTENDED RELEASE ORAL at 08:57

## 2020-04-20 RX ADMIN — BREXPIPRAZOLE 4 MG: 1 TABLET ORAL at 08:56

## 2020-04-20 ASSESSMENT — ACTIVITIES OF DAILY LIVING (ADL)
LAUNDRY: WITH SUPERVISION
DRESS: SCRUBS (BEHAVIORAL HEALTH);INDEPENDENT
HYGIENE/GROOMING: INDEPENDENT
ORAL_HYGIENE: INDEPENDENT
ORAL_HYGIENE: INDEPENDENT
LAUNDRY: WITH SUPERVISION
DRESS: SCRUBS (BEHAVIORAL HEALTH)
HYGIENE/GROOMING: INDEPENDENT
HYGIENE/GROOMING: INDEPENDENT

## 2020-04-20 NOTE — PLAN OF CARE
BEHAVIORAL TEAM DISCUSSION    Participants: MD, CM, OT, RN  Progress: suicidal ideation, poor memory, isolative to room  Anticipated length of stay: unknown  Continued Stay Criteria/Rationale: medication adjustment  Medical/Physical: NA  Precautions:   Behavioral Orders   Procedures     Code 1 - Restrict to Unit     Fall precautions     Fall precautions     Robles Gordon     Routine Programming     As clinically indicated     Status 15     Every 15 minutes.     Plan: encourage groups, medication adjustment, IRTS Placement when stable  Rationale for change in precautions or plan: NA

## 2020-04-20 NOTE — PLAN OF CARE
Problem: Depressive Symptoms  Goal: Depressive Symptoms  Description: Signs and symptoms of listed problems will be absent or manageable.  Suicidal ideation,   Depression.   Outcome: No Change  Flowsheets (Taken 4/20/2020 1411)  Depressive Symptoms Assessed: all  Depressive Symptoms Present:   affect   mood   suicidality   anxiety   insight   thought process   memory deficits  Note: Pt presents with a flat affect and depressed anxious mood. Pt spent the majority of the shift in her room resting. Pt attended unit programming with coaxing but minimally participated if attended. Pt is minimally social or respondent. Pt ate a majority of her food and was med compliant. Pt does still endorse chronic Si.

## 2020-04-20 NOTE — PROGRESS NOTES
"[facility]  Psychiatric Progress Note    Length of stay (days): 59        Interim History:   The patient's care was discussed with the treatment team during the daily team meeting and/or staff's chart notes were reviewed.  Staff report: Continues to endorse suicidal ideation.  No episodes of SIB.  Eating adequately.  Sleeping adequately.    Depression severity scale 0-10 (10=most severe):  Today: 8    The patient continues to report high levels of depressed mood and a desire to commit suicide.  She inquires when she may be discharged home.  She confirms that she plans on committing suicide once discharged back home.  She identified a plan to strangle herself.  She tells me that if she so desired, she could find a way to harm herself in the hospital as well.  She was receptive to a conversation that outlined her plan of care aimed at reducing her depressive symptoms to gain relief from suicidal ideation.  She was agreeable to maintain cooperation with the plan outlined to her.    She recalls having a brief conversation with her children this weekend.  She continues to miss them and would likely benefit from a visit with them, or at minimum a virtual visit.  She has frequently identified her children as being 1 of the few protective factors against suicide however since her relationship with them has been distant, the protective benefits have also been diminishing.  She feels as though they would be \"okay\" without her.    Energy remains low. Concentration is limited. Appetite is normal. Sleeping well at night.    She denied auditory hallucinations today.    Tolerating medications without side effects.    She is agreeable to pursuing residential treatment however does not feel ready enough to transition into that treatment mode yet.           Medications:       brexpiprazole  4 mg Oral Daily     desvenlafaxine succinate  50 mg Oral Daily     lamoTRIgine  200 mg Oral Daily     lithium ER  450 mg Oral " "Q12H JASMINE     mirtazapine  45 mg Oral At Bedtime     ramelteon  8 mg Oral At Bedtime     venlafaxine  75 mg Oral Daily with breakfast          Allergies:   No Known Allergies       Labs:     No results found for this or any previous visit (from the past 24 hour(s)).       Psychiatric Examination:     /81   Pulse 77   Temp 97.8  F (36.6  C) (Oral)   Resp 16   Ht 1.651 m (5' 5\")   Wt 103.7 kg (228 lb 11.2 oz)   SpO2 98%   BMI 38.06 kg/m    Weight is 228 lbs 11.2 oz  Body mass index is 38.06 kg/m .  Orthostatic Vitals     None            Appearance: awake, alert  Attitude:  cooperative  Eye Contact:  fair  Mood:  depressed  Affect:  mood congruent  Speech:  decreased prosody  Psychomotor Behavior:  no evidence of tardive dyskinesia, dystonia, or tics  Throught Process:  linear  Associations:  no loose associations  Thought Content:  no evidence of psychotic thought and active suicidal ideation present  Insight:  partial  Judgement:  fair  Oriented to:  time, person, and place  Attention Span and Concentration:  fair  Recent and Remote Memory:  intact    Clinical Global Impressions  First:     Most recent:            Precautions:     Behavioral Orders   Procedures     Code 1 - Restrict to Unit     Fall precautions     Fall precautions     Robles Gordon     Routine Programming     As clinically indicated     Status 15     Every 15 minutes.          DIagnoses:     Major depressive disorder-recurrent, severe with psychotic features  Generalized anxiety disorder  Suspect borderline personality disorder         Plan:     Continue augmentation with mirtazapine.  Tolerating the higher dose of Rexulti.  Monitoring response aimed at reducing psychosis.  Continue lithium at the current dose.  Serum lithium level was recently 0.85 and stable since the last level.  Plan to increase Pristiq tomorrow to 100 mg daily to better address mood and anxiety symptoms.     ECT:  discontinued due to worsening cognitive side " effects.  She has likely maximized benefits from her current treatment course.  --Monitoring [place] assessments:         9 on April 3, 2020.        12 on April 10, 2020      15 on April 17, 2020    --Travis Andersen was approved to resume an acute series 3 times a week however we will not proceed with utilizing the order at this time due to the reason mentioned above.  The order will be active if needed in the future.    Psychosocial treatments to be addressed with social work consult and groups.    --Consider neuropsychological testing to explore underlying personality characteristics that could be complicating her treatment course.  Once her cognition has improved, consider obtaining testing.  --Our  will assist the patient in working with her , family members, and our business office to assist her in acquiring medical assistance    Legal Status: full commitment with Travis Andersen    Disposition: Explore intensive residential treatment services once the patient has the appropriate funding source for this treatment mode.  Medical assistance is now active.       -----------------------------------------------  Telemedicine Visit: The patient's condition can be safely assessed and treated via synchronous audio and visual telemedicine encounter.      Reason for Telemedicine Visit: COVID-19    Originating Site (Patient Location): [facility]    Distant Site (Provider Location): Provider Remote Setting    Consent:  The patient/guardian has verbally consented to: the potential risks and benefits of telemedicine (video visit) versus in person care; bill my insurance or make self-payment for services provided; and responsibility for payment of non-covered services.     Mode of Communication:  Video Conference via [employer]  Start time: 12:32 PM  End time 12:42 PM    As the provider I attest to compliance with applicable laws and regulations related to telemedicine.

## 2020-04-21 PROCEDURE — 25000132 ZZH RX MED GY IP 250 OP 250 PS 637: Performed by: PSYCHIATRY & NEUROLOGY

## 2020-04-21 PROCEDURE — 99232 SBSQ HOSP IP/OBS MODERATE 35: CPT | Mod: 95 | Performed by: PSYCHIATRY & NEUROLOGY

## 2020-04-21 PROCEDURE — 12400000 ZZH R&B MH

## 2020-04-21 RX ADMIN — LAMOTRIGINE 200 MG: 100 TABLET ORAL at 09:35

## 2020-04-21 RX ADMIN — BREXPIPRAZOLE 4 MG: 1 TABLET ORAL at 09:34

## 2020-04-21 RX ADMIN — DESVENLAFAXINE SUCCINATE 100 MG: 25 TABLET, EXTENDED RELEASE ORAL at 11:05

## 2020-04-21 RX ADMIN — VENLAFAXINE HYDROCHLORIDE 37.5 MG: 37.5 CAPSULE, EXTENDED RELEASE ORAL at 09:42

## 2020-04-21 RX ADMIN — RAMELTEON 8 MG: 8 TABLET ORAL at 20:38

## 2020-04-21 RX ADMIN — LITHIUM CARBONATE 450 MG: 450 TABLET, EXTENDED RELEASE ORAL at 20:38

## 2020-04-21 RX ADMIN — LITHIUM CARBONATE 450 MG: 450 TABLET, EXTENDED RELEASE ORAL at 09:36

## 2020-04-21 RX ADMIN — MIRTAZAPINE 45 MG: 45 TABLET, FILM COATED ORAL at 20:38

## 2020-04-21 ASSESSMENT — ACTIVITIES OF DAILY LIVING (ADL)
HYGIENE/GROOMING: INDEPENDENT
LAUNDRY: WITH SUPERVISION
DRESS: INDEPENDENT
ORAL_HYGIENE: INDEPENDENT

## 2020-04-21 ASSESSMENT — MIFFLIN-ST. JEOR: SCORE: 1723.72

## 2020-04-21 NOTE — PLAN OF CARE
"Patient is A&O. Neuros intact, except she is slow to respond. VSS. Presents as very sad and depressed in mood with flat affect. Speech is soft and quiet. Patient has been isolative to her room and minimally social with peers. Attended unit programing  with much encouragement and participated appropriately. Has limited insight into her situation. Continues to endorse chronic suicidal ideation. Says she had thoughts about \"strangling myself\" but has no intent to act on these thoughts. Patient was moved to a room closer to the nursing station. She verbally contracts for safety. Has poor appetite. Denies pain. Nursing will continue to monitor.  "

## 2020-04-21 NOTE — PROGRESS NOTES
"[facility]  Psychiatric Progress Note    Length of stay (days): 60        Interim History:   The patient's care was discussed with the treatment team during the daily team meeting and/or staff's chart notes were reviewed.  Staff report: She endorsed intense suicidal thoughts yesterday and was moved to a location that was closer to the nurses station to allow for enhanced observation.  Attending a few groups throughout the day.  Eating some of her meals.  Displays a depressed affect.    Depression severity scale 0-10 (10=most severe):  Today: 8    The patient continues to report high levels of depressed mood and a desire to commit suicide.  She again inquires when she may be discharged home.  She confirms that she plans on committing suicide once discharged back home.  She identified a plan to strangle herself. \"I could do it here too if I wanted to.\"  No immediate plan to act on these thoughts reported.    She recalls being able to maintain mood stability better when she was receiving ECT.  She does admit that it was negatively affecting her memory.    Energy remains low. Concentration is limited. Appetite is normal. Sleeping well at night.    She denied auditory hallucinations today.    Tolerating medications without side effects.    She is agreeable to pursuing residential treatment however does not feel ready enough to transition into that treatment mode yet.           Medications:       brexpiprazole  4 mg Oral Daily     desvenlafaxine succinate  100 mg Oral Daily     lamoTRIgine  200 mg Oral Daily     lithium ER  450 mg Oral Q12H JASMINE     mirtazapine  45 mg Oral At Bedtime     ramelteon  8 mg Oral At Bedtime          Allergies:   No Known Allergies       Labs:     No results found for this or any previous visit (from the past 24 hour(s)).       Psychiatric Examination:     /72   Pulse 73   Temp 98.5  F (36.9  C) (Oral)   Resp 16   Ht 1.651 m (5' 5\")   Wt 103.3 kg (227 lb 11.2 oz)   " SpO2 98%   BMI 37.89 kg/m    Weight is 227 lbs 11.2 oz  Body mass index is 37.89 kg/m .  Orthostatic Vitals     None            Appearance: awake, alert  Attitude:  cooperative  Eye Contact:  fair  Mood:  depressed  Affect:  mood congruent  Speech:  decreased prosody  Psychomotor Behavior:  no evidence of tardive dyskinesia, dystonia, or tics  Throught Process:  linear  Associations:  no loose associations  Thought Content:  no evidence of psychotic thought and active suicidal ideation present  Insight:  partial  Judgement:  fair  Oriented to:  time, person, and place  Attention Span and Concentration:  fair  Recent and Remote Memory:  intact    Clinical Global Impressions  First:     Most recent:            Precautions:     Behavioral Orders   Procedures     Code 1 - Restrict to Unit     Fall precautions     Fall precautions     Travis Gordon     Routine Programming     As clinically indicated     Status 15     Every 15 minutes.          DIagnoses:     Major depressive disorder-recurrent, severe with psychotic features  Generalized anxiety disorder  Suspect borderline personality disorder         Plan:     Continue augmentation with mirtazapine.  Tolerating the higher dose of Rexulti.  Monitoring response aimed at reducing psychosis.  Continue lithium at the current dose.  Serum lithium level was recently 0.85 and stable since the last level.  Monitoring on the higher dose of Pristiq to address the severity of depressive symptoms.    ECT:  discontinued due to worsening cognitive side effects.  She has likely maximized benefits from her current treatment course.  --Monitoring [place] assessments:         9 on April 3, 2020.        12 on April 10, 2020      15 on April 17, 2020    --Travis Andersen was approved to resume an acute series 3 times a week however we will not proceed with utilizing the order at this time due to the reason mentioned above.  The order will be active if needed in the future.    Psychosocial  treatments to be addressed with social work consult and groups.    --Consider neuropsychological testing to explore underlying personality characteristics that could be complicating her treatment course.  Once her cognition has improved, consider obtaining testing.  --Our  will assist the patient in working with her , family members, and our business office to assist her in acquiring medical assistance    Legal Status: full commitment with Travis Andersen    Disposition: Explore intensive residential treatment services once the patient has the appropriate funding source for this treatment mode.  Medical assistance is now active.       -----------------------------------------------  Telemedicine Visit: The patient's condition can be safely assessed and treated via synchronous audio and visual telemedicine encounter.      Reason for Telemedicine Visit: COVID-19    Originating Site (Patient Location): [facility] psychiatry    Distant Site (Provider Location): Provider Remote Setting    Consent:  The patient/guardian has verbally consented to: the potential risks and benefits of telemedicine (video visit) versus in person care; bill my insurance or make self-payment for services provided; and responsibility for payment of non-covered services.     Mode of Communication:  Video Conference via [employer]  Start time: 12:31 PM  End time 12:43 PM    As the provider I attest to compliance with applicable laws and regulations related to telemedicine.

## 2020-04-21 NOTE — PLAN OF CARE
"  Problem: Depressive Symptoms  Goal: Depressive Symptoms  Description: Signs and symptoms of listed problems will be absent or manageable.  Suicidal ideation,   Depression.   Outcome: No Change  Flowsheets (Taken 4/21/2020 1301)  Depressive Symptoms Assessed: all  Depressive Symptoms Present:   suicidality   affect   mood   thought process   insight   other (see comment)  Note: Pt presents with a flat affect and depressed mood. She endorses active SI with a plan to suffocate herself with her pillow - contracts for safety, states that the thoughts are intense but she does not currently have an intent to act on them. Pt was withdrawn all day, spending most of her time in bed attempting to \"sleep off\" her negative thoughts. Insight poor, judgement impaired. She is med-compliant, able to complete ADL's such as eating and showering.      "

## 2020-04-22 PROCEDURE — 12400000 ZZH R&B MH

## 2020-04-22 PROCEDURE — 25000132 ZZH RX MED GY IP 250 OP 250 PS 637: Performed by: PSYCHIATRY & NEUROLOGY

## 2020-04-22 PROCEDURE — 99232 SBSQ HOSP IP/OBS MODERATE 35: CPT | Mod: 95 | Performed by: PSYCHIATRY & NEUROLOGY

## 2020-04-22 RX ADMIN — LITHIUM CARBONATE 450 MG: 450 TABLET, EXTENDED RELEASE ORAL at 21:06

## 2020-04-22 RX ADMIN — RAMELTEON 8 MG: 8 TABLET ORAL at 21:06

## 2020-04-22 RX ADMIN — LAMOTRIGINE 200 MG: 100 TABLET ORAL at 09:30

## 2020-04-22 RX ADMIN — LITHIUM CARBONATE 450 MG: 450 TABLET, EXTENDED RELEASE ORAL at 09:30

## 2020-04-22 RX ADMIN — ACETAMINOPHEN 650 MG: 325 TABLET, FILM COATED ORAL at 11:52

## 2020-04-22 RX ADMIN — MIRTAZAPINE 45 MG: 45 TABLET, FILM COATED ORAL at 21:06

## 2020-04-22 RX ADMIN — BREXPIPRAZOLE 4 MG: 1 TABLET ORAL at 09:29

## 2020-04-22 RX ADMIN — DESVENLAFAXINE SUCCINATE 100 MG: 25 TABLET, EXTENDED RELEASE ORAL at 09:29

## 2020-04-22 ASSESSMENT — ACTIVITIES OF DAILY LIVING (ADL)
ORAL_HYGIENE: INDEPENDENT
DRESS: SCRUBS (BEHAVIORAL HEALTH)
LAUNDRY: UNABLE TO COMPLETE
DRESS: INDEPENDENT
ORAL_HYGIENE: INDEPENDENT
HYGIENE/GROOMING: INDEPENDENT
LAUNDRY: WITH SUPERVISION
HYGIENE/GROOMING: INDEPENDENT

## 2020-04-22 NOTE — PLAN OF CARE
"Patient continues to present as sad and depressed in mood. Affect remains flat but reactive. Patient spent the majority of her time isolating to her room and minimally social with peers. Currently denies any pain. She is still contemplating suicide and stated: \"it's the same, I think about strangling myself.\" Patient was encouraged to step out of her room in order to remain visible. She became agreeable to this with some encouragement. Patient spent some time in the lounge. She verbally contracts for safety. Has poor appetite. Denies pain. Nursing will continue to monitor.  "

## 2020-04-22 NOTE — PROGRESS NOTES
"[facility]  Psychiatric Progress Note    Length of stay (days): 61        Interim History:   The patient's care was discussed with the treatment team during the daily team meeting and/or staff's chart notes were reviewed.  Staff report: She continues to endorse suicidal thoughts..  Attending a few groups throughout the day.  Eating some of her meals.  Displays a depressed affect.    Depression severity scale 0-10 (10=most severe):  Today: 8    The patient continues to report high levels of depressed mood and a desire to commit suicide.  She again inquires when she may be discharged home.  She confirms that she plans on committing suicide once discharged back home. \"I just want to die.\" No immediate plan to act on these thoughts reported.     She tells me she is not spoken to her children in over 3 days.  She admits that this lack of communication typically intensifies her suicidal thoughts.  She believes that her children are growing tired of her and no longer want to talk to her because of her depressive symptoms.  She adds that her  also offers little compassion or support.  She recalls that he frequently feels that her, emphasizing is concerned that her depressive symptoms are negatively affecting her children.    She recalls being able to maintain mood stability better when she was receiving ECT.  She does admit that it was negatively affecting her memory.    Energy remains low. Concentration is limited. Appetite is normal. Sleeping well at night.    She denied auditory hallucinations today.    Tolerating medications without side effects.    She is aware that an interview will soon be conducted for residential treatment.  He was agreeable to pursue psychological testing to further evaluate personality characteristics and better gauge her coping skills as well as psychological vulnerabilities.           Medications:       brexpiprazole  4 mg Oral Daily     desvenlafaxine succinate  100 mg " "Oral Daily     lamoTRIgine  200 mg Oral Daily     lithium ER  450 mg Oral Q12H JASMINE     mirtazapine  45 mg Oral At Bedtime     ramelteon  8 mg Oral At Bedtime          Allergies:   No Known Allergies       Labs:     No results found for this or any previous visit (from the past 24 hour(s)).       Psychiatric Examination:     /74 (BP Location: Right arm)   Pulse 96   Temp 98  F (36.7  C) (Oral)   Resp 16   Ht 1.651 m (5' 5\")   Wt 103.3 kg (227 lb 11.2 oz)   SpO2 100%   BMI 37.89 kg/m    Weight is 227 lbs 11.2 oz  Body mass index is 37.89 kg/m .  Orthostatic Vitals     None            Appearance: awake, alert  Attitude:  cooperative  Eye Contact:  fair  Mood:  depressed  Affect:  mood congruent  Speech:  decreased prosody  Psychomotor Behavior:  no evidence of tardive dyskinesia, dystonia, or tics  Throught Process:  linear  Associations:  no loose associations  Thought Content:  no evidence of psychotic thought and active suicidal ideation present  Insight:  partial  Judgement:  fair  Oriented to:  time, person, and place  Attention Span and Concentration:  fair  Recent and Remote Memory:  intact    Clinical Global Impressions  First:     Most recent:            Precautions:     Behavioral Orders   Procedures     Code 1 - Restrict to Unit     Fall precautions     Fall precautions     [place]     MMPI 2     Robles Gordon     Routine Programming     As clinically indicated     Status 15     Every 15 minutes.          DIagnoses:     Major depressive disorder-recurrent, severe with psychotic features  Generalized anxiety disorder  Suspect borderline personality disorder         Plan:     Continue augmentation with mirtazapine.  Tolerating the higher dose of Rexulti.  Monitoring response aimed at reducing psychosis.  Continue lithium at the current dose.    Check a serum lithium and Lamictal level tomorrow morning.  Consider optimizing lamotrigine to further address affective instability.  Monitoring on the " higher dose of Pristiq to address the severity of depressive symptoms.    ECT:  discontinued due to worsening cognitive side effects.  She has likely maximized benefits from her current treatment course.  --Monitoring [place] assessments:         9 on April 3, 2020.        12 on April 10, 2020      15 on April 17, 2020    --Travis Andersen was approved to resume an acute series 3 times a week however we will not proceed with utilizing the order at this time due to the reason mentioned above.  The order will be active if needed in the future.    Psychosocial treatments to be addressed with social work consult and groups.    --Initiate neuropsychological testing to explore underlying personality characteristics that could be complicating her treatment course.  The patient's cognition has been improving and she is agreeable to proceed with the testing.    Legal Status: full commitment with Travis Andersen    Disposition: Explore intensive residential treatment services once the patient has the appropriate funding source for this treatment mode.  Medical assistance is now active.       -----------------------------------------------  Telemedicine Visit: The patient's condition can be safely assessed and treated via synchronous audio and visual telemedicine encounter.      Reason for Telemedicine Visit: COVID-19    Originating Site (Patient Location): [facility] inpatient psychiatry    Distant Site (Provider Location): Provider Remote Setting    Consent:  The patient/guardian has verbally consented to: the potential risks and benefits of telemedicine (video visit) versus in person care; bill my insurance or make self-payment for services provided; and responsibility for payment of non-covered services.     Mode of Communication:  Video Conference via [employer]  Start time: 12:30 PM  End time 12:43 PM    As the provider I attest to compliance with applicable laws and regulations related to telemedicine.

## 2020-04-22 NOTE — DISCHARGE INSTRUCTIONS
Behavioral Discharge Planning and Instructions        Summary:  You were admitted to [facility] under the care of Dr Laughlin. You were transferred from [facility] due to needing ECT.    The transfer was necessary to be in accordance with the [address] order.   You were being treated for depression and suicidal ideation.    Your acute suicidal ideation has improved but you still experience less intense, chronic suicidal ideation.      [facility] commitment to [facility] and the [facility] Human [facility], as of 12-18-19.    There is also a [place] order (which includes Rexulti, Vraylar, Latuda, and Abilify.)  There was also a [facility] [address] order issued by [facility] on 12-27-19.    While in the hospital, you had a course of ECT with the last treatment being done on 4-8-20.    Do NOT drive for 7 days from your last treatment.     While in the hospital, your symptoms improved.   You are being Provisionally discharged to [facility] in [facility].      Main Diagnoses: Major depression, recurrent, severe, without psychotic features.  Generalized Anxiety Disorder  Traits of Borderline Personality Disorder        Mental Health Follow-Up:   New Referral:      Next appointment:   Friday May 1st at 10:30am with Anderson Moss.     This is an intake appointment.    Following this appointment, Anderson will schedule an appointment for you with a psychiatrist.   Please be on time.  [facility]  [address]     [facility]  Ph:  [phone]    Fx:  [phone]      --------------------------------------------------------     Your [facility]  is Samreen Machuca at [phone].    Fx:  [phone]     ---------------------------------------------------------                Your insurance/[employer] Phone  is Kavon at [phone].  She will be calling you at home     Attend all scheduled appointments with your outpatient  providers. Call at least 24 hours in advance if you need to reschedule an appointment to ensure continued access to your outpatient providers.   Major Treatments, Procedures and Findings:  You were provided with: a psychiatric assessment, assessed for medical stability, medication evaluation and/or management and group therapy     Symptoms to Report: feeling more aggressive, increased confusion, losing more sleep, mood getting worse or thoughts of suicide     Early warning signs can include: increased depression or anxiety sleep disturbances increased thoughts or behaviors of suicide or self-harm  increased unusual thinking, such as paranoia or hearing voices     Safety and Wellness:  Take all medicines as directed.  Make no changes unless your doctor suggests them.      Follow treatment recommendations.  Refrain from alcohol and non-prescribed drugs.  If there is a concern for safety, call 911.     Resources:   Crisis Intervention: 553.351.2140 or 590-739-1123 (TTY: 954.360.6933).  Call anytime for help.  National [place] on Mental Illness (www.[place].davian.org): 202.152.2760 or 908-083-4280.  Suicide Awareness Voices of Education (SAVE) (www.save.org): 050-510-PQPX (4883)  National Suicide Prevention Line (www.mentalhealthmn.org): 863-144-BWLF (0103)  Mental Health Consumer/Survivor Network of [place] (www.mhcsn.net): 596.370.8057 or 904-940-6499  [facility] Crisis (COPE) Response - Adult [phone]     The treatment team has appreciated the opportunity to work with you. If you have any questions or concerns our unit number is [phone]

## 2020-04-22 NOTE — PROGRESS NOTES
Phone interview with Juarez MATHEWS went well today.   Pt can discharged to that program on Friday April 24th, if that timing is ok with Dr Laughlin.

## 2020-04-22 NOTE — PLAN OF CARE
Problem: Adult Behavioral Health Plan of Care  Goal: Patient-Specific Goal (Individualization)  Description: 1. Absence of suicidal ideation with safety plan in place.  2. Effective medication regime with patient compliance.  3. Stabilization of mood and thought processes.  4. Improved insight into mental health issues.  5. Able to identify and utilize positive coping skills.  6. Plan in place for ongoing treatment and support.     Outcome: No Change  Note: Pt has been fairly isolative to her room bed resting. Pt has been feeling sad today and talked about how she has not talked to her kids in several days. Pt upset about things at home and talked about how her  and family do not understand depression. Pt has been encouraged to use resources such as SHARON to help educated family. Pt continues to feel suicidal and rates her SI 8/10 in terms of severity. Pt states she has thoughts with a plan but no specifics plan and can not do it here. Pt denies hearing voices and remains flat and depressed. Pt is not happy about her discharge plan and is not sure about going to an IRTS. Pt encouraged to be positive and focus on how this may help with some of the psychosocial aspect to her care as she could use more support after discharge. Pt was able to have an interview with Juarez this afternoon. Plan to have discharge to an IRTS. Nursing to continue to monitor.

## 2020-04-23 ENCOUNTER — TRANSFERRED RECORDS (OUTPATIENT)
Dept: HEALTH INFORMATION MANAGEMENT | Facility: CLINIC | Age: 35
End: 2020-04-23

## 2020-04-23 LAB — LITHIUM SERPL-SCNC: 1.04 MMOL/L (ref 0.6–1.2)

## 2020-04-23 PROCEDURE — 25000132 ZZH RX MED GY IP 250 OP 250 PS 637: Performed by: PSYCHIATRY & NEUROLOGY

## 2020-04-23 PROCEDURE — 36415 COLL VENOUS BLD VENIPUNCTURE: CPT | Performed by: PSYCHIATRY & NEUROLOGY

## 2020-04-23 PROCEDURE — 99232 SBSQ HOSP IP/OBS MODERATE 35: CPT | Mod: 95 | Performed by: PSYCHIATRY & NEUROLOGY

## 2020-04-23 PROCEDURE — 80178 ASSAY OF LITHIUM: CPT | Performed by: PSYCHIATRY & NEUROLOGY

## 2020-04-23 PROCEDURE — 12400000 ZZH R&B MH

## 2020-04-23 PROCEDURE — 80175 DRUG SCREEN QUAN LAMOTRIGINE: CPT | Performed by: PSYCHIATRY & NEUROLOGY

## 2020-04-23 RX ORDER — RAMELTEON 8 MG/1
8 TABLET ORAL AT BEDTIME
Qty: 30 TABLET | Refills: 0 | Status: ON HOLD | OUTPATIENT
Start: 2020-04-23 | End: 2020-06-22

## 2020-04-23 RX ORDER — MIRTAZAPINE 45 MG/1
45 TABLET, FILM COATED ORAL AT BEDTIME
Qty: 30 TABLET | Refills: 0 | Status: ON HOLD | OUTPATIENT
Start: 2020-04-23 | End: 2020-05-07

## 2020-04-23 RX ORDER — LAMOTRIGINE 200 MG/1
200 TABLET ORAL DAILY
Qty: 30 TABLET | Refills: 0 | Status: ON HOLD | OUTPATIENT
Start: 2020-04-24 | End: 2020-06-22

## 2020-04-23 RX ORDER — LITHIUM CARBONATE 450 MG
450 TABLET, EXTENDED RELEASE ORAL EVERY 12 HOURS
Qty: 60 TABLET | Refills: 0 | Status: ON HOLD | OUTPATIENT
Start: 2020-04-23 | End: 2020-05-07

## 2020-04-23 RX ORDER — HYDROXYZINE HYDROCHLORIDE 25 MG/1
25-50 TABLET, FILM COATED ORAL 3 TIMES DAILY PRN
Qty: 30 TABLET | Refills: 0 | Status: ON HOLD | OUTPATIENT
Start: 2020-04-23 | End: 2020-06-22

## 2020-04-23 RX ORDER — DESVENLAFAXINE 100 MG/1
100 TABLET, EXTENDED RELEASE ORAL DAILY
Qty: 30 TABLET | Refills: 0 | Status: ON HOLD | OUTPATIENT
Start: 2020-04-24 | End: 2020-05-07

## 2020-04-23 RX ADMIN — LITHIUM CARBONATE 450 MG: 450 TABLET, EXTENDED RELEASE ORAL at 08:58

## 2020-04-23 RX ADMIN — LITHIUM CARBONATE 450 MG: 450 TABLET, EXTENDED RELEASE ORAL at 20:48

## 2020-04-23 RX ADMIN — RAMELTEON 8 MG: 8 TABLET ORAL at 20:48

## 2020-04-23 RX ADMIN — MIRTAZAPINE 45 MG: 45 TABLET, FILM COATED ORAL at 20:48

## 2020-04-23 RX ADMIN — LAMOTRIGINE 200 MG: 100 TABLET ORAL at 08:59

## 2020-04-23 RX ADMIN — DESVENLAFAXINE SUCCINATE 100 MG: 25 TABLET, EXTENDED RELEASE ORAL at 08:37

## 2020-04-23 RX ADMIN — BREXPIPRAZOLE 4 MG: 1 TABLET ORAL at 08:37

## 2020-04-23 ASSESSMENT — ACTIVITIES OF DAILY LIVING (ADL)
LAUNDRY: UNABLE TO COMPLETE
ORAL_HYGIENE: INDEPENDENT
DRESS: SCRUBS (BEHAVIORAL HEALTH)
HYGIENE/GROOMING: INDEPENDENT

## 2020-04-23 NOTE — PLAN OF CARE
"Patient remains suicidal. Has poor appetite, says she wants to \"starve myself to death.\" She described her most recent stressor as not being able to connect with her children. Patient feels as if \"no one understands me.\" She continues to present as sad and depressed in mood, with flat affect. Refused to eat her meals, but willing to drink fluids. She only drank the hot chocolate on her meal tray. Patient has been isolative to her room and declined offers to attend unit programing. She denies any pain. Patient spent some time in the lounge with much encouragement by staff. Patient verbally contracts for safety.  She completed the MILLON. Says she will complete MMPI- 2 tomorrow.   "

## 2020-04-23 NOTE — PROGRESS NOTES
[facility]  Psychiatric Progress Note    Length of stay (days): 62        Interim History:   The patient's care was discussed with the treatment team during the daily team meeting and/or staff's chart notes were reviewed.  Staff report: She continues to endorse suicidal thoughts.no attempts made to harm herself.  Attending a few groups throughout the day.  Eating some of her meals.  Displays a depressed affect.     Depression severity scale 0-10 (10=most severe):  Today: 7    She was in her room, sitting in a chair, working on completing neuropsychological testing.    She seemed a little bit more hopeful to learn that she has been accepted for residential treatment.  She is hopeful to be able to visit with her children more often outside of the hospital which has been a restrictive environment which has limited visits with her family.  We reviewed how her relationship with her children has been protective against suicide and maintaining close contact with family members over the following weeks should be promoted to maintain that protective factor.    She reported mild to moderate anxiety related to transitioning to a residential treatment facility.  This is a new treatment note for her and we reviewed anticipated expectations to help alleviate her anxiety.    She will attempt to arrange transportation to the facility with her  if possible.    She reports that her appetite continues to be poor and inquired how they would address that issue in the treatment facility.    Energy remains low. Concentration is limited. Appetite is normal. Sleeping well at night.    She denied auditory hallucinations today.    Tolerating medications without side effects.           Medications:       brexpiprazole  4 mg Oral Daily     desvenlafaxine succinate  100 mg Oral Daily     lamoTRIgine  200 mg Oral Daily     lithium ER  450 mg Oral Q12H JASMINE     mirtazapine  45 mg Oral At Bedtime     ramelteon  8 mg Oral At  "Bedtime          Allergies:   No Known Allergies       Labs:     Recent Results (from the past 24 hour(s))   Lithium level    Collection Time: 04/23/20  8:47 AM   Result Value Ref Range    Lithium Level 1.04 0.60 - 1.20 mmol/L          Psychiatric Examination:     BP 96/68   Pulse 75   Temp 98.1  F (36.7  C) (Oral)   Resp 16   Ht 1.651 m (5' 5\")   Wt 103.3 kg (227 lb 11.2 oz)   SpO2 96%   BMI 37.89 kg/m    Weight is 227 lbs 11.2 oz  Body mass index is 37.89 kg/m .  Orthostatic Vitals     None            Appearance: awake, alert  Attitude:  cooperative  Eye Contact:  fair  Mood:  depressed  Affect:  mood congruent  Speech:  decreased prosody  Psychomotor Behavior:  no evidence of tardive dyskinesia, dystonia, or tics  Throught Process:  linear  Associations:  no loose associations  Thought Content:  no evidence of psychotic thought and active suicidal ideation present  Insight:  partial  Judgement:  fair  Oriented to:  time, person, and place  Attention Span and Concentration:  fair  Recent and Remote Memory:  intact    Clinical Global Impressions  First:     Most recent:            Precautions:     Behavioral Orders   Procedures     Code 1 - Restrict to Unit     Fall precautions     Fall precautions     [place]     MMPI 2     Robles Gordon     Routine Programming     As clinically indicated     Status 15     Every 15 minutes.          DIagnoses:     Major depressive disorder-recurrent, severe with psychotic features  Generalized anxiety disorder  Suspect borderline personality disorder         Plan:     Continue augmentation with mirtazapine.  Tolerating the higher dose of Rexulti.  Monitoring response aimed at reducing psychosis.  Continue lithium at the current dose.  Serum lithium level reviewed from this morning and remains at a therapeutic level.  Awaiting results of the lamotrigine level.    Monitoring on the higher dose of Pristiq to address the severity of depressive symptoms.    ECT:  discontinued due " to worsening cognitive side effects.  She has likely maximized benefits from her current treatment course.  --Monitoring [place] assessments:         9 on April 3, 2020.        12 on April 10, 2020      15 on April 17, 2020      19 on April 23, 2020    --Travis Andersen was approved to resume an acute series 3 times a week however we will not proceed with utilizing the order at this time due to the reason mentioned above.  The order will be active if needed in the future.    Psychosocial treatments to be addressed with social work consult and groups.    --We discussed continuing to complete the neuropsychological assessment on an outpatient basis.    Legal Status: full commitment with Travis Andersen    Disposition: Begin planning to transition to an intensive residential treatment facility tomorrow.  The patient is aware.  She will attempt to coordinate transportation with her .       -----------------------------------------------  Telemedicine Visit: The patient's condition can be safely assessed and treated via synchronous audio and visual telemedicine encounter.      Reason for Telemedicine Visit: COVID-19    Originating Site (Patient Location): [facility] inpatient psychiatry    Distant Site (Provider Location): Provider Remote Setting    Consent:  The patient/guardian has verbally consented to: the potential risks and benefits of telemedicine (video visit) versus in person care; bill my insurance or make self-payment for services provided; and responsibility for payment of non-covered services.     Mode of Communication:  Video Conference via [place]  Start time: 10:17 AM  End time 10:32 AM    As the provider I attest to compliance with applicable laws and regulations related to telemedicine.

## 2020-04-23 NOTE — PROGRESS NOTES
Assessed pt cognition with MoCA assessment per MD request. Pt scored 19/30, indicating below normal cognition. However, pt continues to demonstrate improving cognition as this score is an increase of 4 points from last Friday (4/17/20) when pt scored 15/30. Pt demo'd improvements in short term memory and working memory.

## 2020-04-24 ENCOUNTER — TRANSFERRED RECORDS (OUTPATIENT)
Dept: HEALTH INFORMATION MANAGEMENT | Facility: CLINIC | Age: 35
End: 2020-04-24

## 2020-04-24 ENCOUNTER — DOCUMENTATION ONLY (OUTPATIENT)
Dept: BEHAVIORAL HEALTH | Facility: CLINIC | Age: 35
End: 2020-04-24

## 2020-04-24 LAB — LAMOTRIGINE SERPL-MCNC: 2.2 UG/ML (ref 2.5–15)

## 2020-04-24 PROCEDURE — 25000132 ZZH RX MED GY IP 250 OP 250 PS 637: Performed by: PSYCHIATRY & NEUROLOGY

## 2020-04-24 PROCEDURE — 99239 HOSP IP/OBS DSCHRG MGMT >30: CPT | Performed by: PSYCHIATRY & NEUROLOGY

## 2020-04-24 PROCEDURE — 12400000 ZZH R&B MH

## 2020-04-24 RX ADMIN — BREXPIPRAZOLE 4 MG: 1 TABLET ORAL at 08:38

## 2020-04-24 RX ADMIN — MIRTAZAPINE 45 MG: 45 TABLET, FILM COATED ORAL at 20:53

## 2020-04-24 RX ADMIN — LAMOTRIGINE 200 MG: 100 TABLET ORAL at 08:38

## 2020-04-24 RX ADMIN — RAMELTEON 8 MG: 8 TABLET ORAL at 20:53

## 2020-04-24 RX ADMIN — DESVENLAFAXINE SUCCINATE 100 MG: 25 TABLET, EXTENDED RELEASE ORAL at 08:38

## 2020-04-24 RX ADMIN — LITHIUM CARBONATE 450 MG: 450 TABLET, EXTENDED RELEASE ORAL at 08:38

## 2020-04-24 RX ADMIN — LITHIUM CARBONATE 450 MG: 450 TABLET, EXTENDED RELEASE ORAL at 20:53

## 2020-04-24 ASSESSMENT — ACTIVITIES OF DAILY LIVING (ADL)
ORAL_HYGIENE: INDEPENDENT
LAUNDRY: WITH SUPERVISION
DRESS: INDEPENDENT
HYGIENE/GROOMING: INDEPENDENT
ORAL_HYGIENE: INDEPENDENT
DRESS: INDEPENDENT
HYGIENE/GROOMING: INDEPENDENT

## 2020-04-24 NOTE — PLAN OF CARE
Problem: Adult Behavioral Health Plan of Care  Goal: Patient-Specific Goal (Individualization)  Description: 1. Absence of suicidal ideation with safety plan in place.  2. Effective medication regime with patient compliance.  3. Stabilization of mood and thought processes.  4. Improved insight into mental health issues.  5. Able to identify and utilize positive coping skills.  6. Plan in place for ongoing treatment and support.     Outcome: Adequate for Discharge  Note: Pt has been present on the unit and has been looking forward to discharge. Pt talked with staff about how she feels more hopeful and stated that her life does have meaning. Pt identified her kids as a supportive factor and does see them as hope in her life. Pt still remains depressed and flat. Pt has some SI however it is fleeting and minimal. Pt denies any plan. Nursing to continue to monitor.

## 2020-04-24 NOTE — PLAN OF CARE
Patient is A&O, presents as sad , mood is anxious and depressed, with blunted affect. She is bright upon approach. Patient's thought process is with poor concentration. Patient has been working on the MMPI-2 much of the evening. Spent time the SDU lounge, but minimally social. Attend unit programing and participated minimally with encouragement. States she is anxious but excited to be discharging tomorrow 4/24. Insight into her situation seems to be slightly improved. Patient continues to endorse chronic suicidal ideations. Plan is for likely discharge to [facility] tomorrow. Prior authorization paperwork was faxed. Per the staff, they will likely process it tomorrow morning. Nursing will continue to monitor for safety.

## 2020-04-24 NOTE — PROGRESS NOTES
Prior Authorization **INITIATED**    Medication: Desvenlafaxine Succinate ER 100mg tabs  Insurance Company: [place] Medicaid ([facility]) - Phone [phone] Fax [phone]  Pharmacy Filling the Rx: [facility] - [address] [facility]  Start Date: 4/24/2020  Reference #: CoverMyMeds Key: [id number]  Comments:  n/a      Yudy Burger CPhT  [place] Discharge Pharmacy Liaison  Pronouns: She/Her/Hers    [facility]  [address]  [address] [address]   [email or web address]  [email or web address]   Phone: [phone]  Pager: [phone]  Fax: [phone]

## 2020-04-24 NOTE — DISCHARGE SUMMARY
[facility]  Department of Psychiatry    DATE OF ADMISSION:  2/21/2020    DATE OF DISCHARGE:  April 24, 2020    DISCHARGE DIAGNOSES:   Major depressive disorder-recurrent, severe with psychotic features  Generalized anxiety disorder  Suspect borderline personality disorder    HOSPITAL COURSE: (Refer to H&P, progress notes, and consult notes for details)    The patient was admitted to our Behavioral Health Unit under voluntary status for depressed mood and suicidal ideation.  She was briefly admitted to the inpatient psychiatric unit at [facility] and transferred to inpatient psychiatry at [facility] shortly afterwards due to her prior treatment history, involuntary commitment status, and price Andersen order previously being through [facility].  Hospitalization was continued voluntarily under my care.  The patient was initially refusing food and fluid intake while verbalizing intent to commit suicide through starvation.  ECT was restarted under the price Andersen order.  A request was also made to the courts to allow for another acute series of ECT at 3 times a week.  In the meantime, ECT was initiated at twice a week which was what the current price Andersen order allowed.  She received approximately 13 ECT sessions.  During that time, her prior to admission antidepressants were resumed which included a combination of Effexor and Remeron at maximum dosing while augmenting with Rexulti and lamotrigine which would also assist in managing affective instability of a suspected underlying personality disorder.  Noting ongoing depressive symptoms, Effexor was discontinued and replaced with Pristiq which was titrated up to 100 mg daily with good tolerability.  Results of her gene site testing were also reviewed and incorporated into her current plan of care.  Lithium was later added for additional augmentation and the dose was titrated while monitoring  serum levels to achieve a therapeutic level.    The patient's mood slightly improved and the intensity of suicidal ideations was moderately reduced.  She resumed normal food intake and drink fluids appropriately.  She maintained compliance with her medications.  She cooperated with the majority of her plan of care.  She would intermittently report suicidal ideation however did not engage in self-injurious behavior.  Her mood seemed to visibly improve on the days of ECT and would decline on days where she did not receive ECT.  Unfortunately, she began to experience cognitive side effects of ECT, demonstrated through serial [place] testing:  --Monitoring [place] assessments:         9 on April 3, 2020.        12 on April 10, 2020      15 on April 17, 2020    Other interventions received during his hospitalization included:   Psychosocial treatments were addressed with groups, social work consult, and supportive milieu provided by staff.    CONDITION AT DISCHARGE:  Improved.  The patients acute suicide risk is low due to the following factors:  improved mood/anxiety symptoms.  Denies suicidal ideations. Denies psychotic symptoms.  Not actively intoxicated and plans to abstain from illicit substances and alcohol.  Denies access to guns.  Denies feeling hopeless or helpless. At the time of discharge Misty Parham was determined to not be an immediate danger to herself or others. The patient's acute risk will be higher if noncompliant with treatment plan, medications, follow-up or using illicit substances or alcohol.  These findings along with the risks of noncompliance with medications and treatment plan, which could potentially cause decompensation and increase the risk for suicide, were discussed with the patient.  The patients chronic suicide risk is moderate given the following factors: past suicide gestures without serious attempts; white race; diagnosis of MDD, likely Borderline Personality disorder; history of SIB;  unemployed; Denied a family history of suicide.  Preventative factors include: social supports, children     MENTAL STATUS EXAMINATION AT TIME OF DISCHARGE:  The patient is 34 year old White female who appears their stated age and is appropriately dressed with good hygiene.  Calm and cooperative with the interview questions.  No psychomotor abnormalities are noted. Eye contact is appropriate. Speech has normal rate, tone, latency and volume and is not pushed or pressured. Mood is depressed and affect is blunted.  The patient does not seem overtly depressed, anxious, manic or irritable.  Thought process is linear, logical and future oriented.  Thought process is not tangential, circumstantial or disorganized.  Thought content is not significant for apperant paranoia, delusions, ideas of reference or grandiosity.  The patient denies suicidal and homicidal ideations as well as auditory and visual hallucinations.  Insight and judgment are fair.  Cognition appears intact to interviewing including orientation, recent and remote memory, fund of knowledge, use of language, attention span and concentration.  Muscle strength, tone and gait appear normal on visual inspection.      DISPOSITION:  The patient is discharged to the [facility] while under involuntary commitment for treatment of mental illness with a Robles Sr order, now under a provisional discharge status.    FOLLOWUP APPOINTMENTS:  ( per social workers notes and after visit summary)  Mental Health Follow-Up:   New Referral:      Next appointment:   Friday May 1st at 10:30am with Anderson Moss.     This is an intake appointment.    Following this appointment, Anderson will schedule an appointment for you with a psychiatrist.   Please be on time.  [facility]  [address]     MPLS  Ph:  [phone]    Fx:  [phone]     Your [facility]  is Samreen Machuca at [phone].    Fx:  [phone]      DISCHARGE MEDICATIONS:   Current Discharge Medication List      START taking these medications    Details   desvenlafaxine succinate (PRISTIQ) 100 MG 24 hr tablet Take 1 tablet (100 mg) by mouth daily  Qty: 30 tablet, Refills: 0    Associated Diagnoses: Severe recurrent major depressive disorder with psychotic features (H)      lithium (ESKALITH CR/LITHOBID) 450 MG CR tablet Take 1 tablet (450 mg) by mouth every 12 hours  Qty: 60 tablet, Refills: 0    Associated Diagnoses: Severe recurrent major depressive disorder with psychotic features (H)      ramelteon (ROZEREM) 8 MG tablet Take 1 tablet (8 mg) by mouth At Bedtime  Qty: 30 tablet, Refills: 0    Associated Diagnoses: Primary insomnia         CONTINUE these medications which have CHANGED    Details   brexpiprazole (REXULTI) 3 MG tablet Take 1 tablet (3 mg) by mouth daily  Qty: 30 tablet, Refills: 0    Associated Diagnoses: Severe recurrent major depressive disorder with psychotic features (H)      hydrOXYzine (ATARAX) 25 MG tablet Take 1-2 tablets (25-50 mg) by mouth 3 times daily as needed for anxiety  Qty: 30 tablet, Refills: 0    Associated Diagnoses: Severe episode of recurrent major depressive disorder, without psychotic features (H)      lamoTRIgine (LAMICTAL) 200 MG tablet Take 1 tablet (200 mg) by mouth daily  Qty: 30 tablet, Refills: 0    Associated Diagnoses: Borderline personality disorder (H)      mirtazapine (REMERON) 45 MG tablet Take 1 tablet (45 mg) by mouth At Bedtime  Qty: 30 tablet, Refills: 0    Associated Diagnoses: Severe episode of recurrent major depressive disorder, without psychotic features (H)         STOP taking these medications       venlafaxine (EFFEXOR-XR) 75 MG 24 hr capsule Comments:   Reason for Stopping:                LABORATORY RESULTS: (past 14 days)  Recent Results (from the past 336 hour(s))   Lithium level    Collection Time: 04/23/20  8:47 AM   Result Value Ref Range    Lithium Level 1.04 0.60 - 1.20  mmol/L   Lamotrigine Level    Collection Time: 04/23/20  8:47 AM   Result Value Ref Range    Lamotrigine Level 2.2 (L) 2.5 - 15.0 ug/mL       >30 minutes was spent on this discharge to allow for reviewing the patient's response to treatment, reviewing plan of care, education on medications and diagnosis, and conducting a risk assessment.    Visit/Communication Style   VIRTUAL (VIDEO) communication was used to evaluate Misty due to the COVID pandemic.    Misty consented to the use of video communication: yes  Video START time: 10:17 AM  Video STOP time: 10:32 AM  Patient's location: [facility]   Provider's location during the visit: Home

## 2020-04-24 NOTE — PROGRESS NOTES
Prior Authorization **INITIATED**    Medication: Rexulti 3mg tabs  Insurance Company: [place] Medicaid ([facility]) - Phone [phone] Fax [phone]  Pharmacy Filling the Rx: [facility] - [address] [facility]  Start Date: 4/24/2020  Reference #: CoverMyMeds Key: [id number]  Comments:  PA for 4mg denied as max allowed is 3mg. Initiating PA for coverage of 3mg.      Yudy Burger CPhT  [place] Discharge Pharmacy Liaison  Pronouns: She/Her/Hers    [facility]  [address]  [address] [place] [address] [address], [place] [address]   [email or web address]  [email or web address]   Phone: [phone]  Pager: [phone]  Fax: [phone]

## 2020-04-24 NOTE — PROGRESS NOTES
Spoke with Ju at [facility] to confirm time frame for discharge. Pt at the latest able to arrive at 1500.

## 2020-04-24 NOTE — PROGRESS NOTES
Prior Authorization **APPROVED**    Authorization Effective Date:    Authorization Expiration Date:    Medication: Desvenlafaxine Succinate ER 100mg tabs **APPROVED**  Approved Dose/Quantity: 1 tablet daily  Reference #: CoverMyMeds Key: [id number], PA #: [id number] via Pharmacy NPI [id number]   Insurance Company: [place] Medicaid ([facility]) - Phone [phone] Fax [phone]  Expected CoPay: unk     CoPay Card Available: No    Foundation Assistance Needed: n/a  Which Pharmacy is filling the prescription (Not needed for infusion/clinic administered): [facility]  [address] [address]  Comments:  N/a    Note: This Prior Authorization is pharmacy-specific and NDC-specific. Current PA is approved through [facility]. If dispensing pharmacy or NDC changes from this initial approval, dispensing pharmacy can call [facility] at [phone] to update this information.        Yudy Burger CPhT  [place] Discharge Pharmacy Liaison  Pronouns: She/Her/Hers    [facility]  [address]  [address] [address]   [email or web address]  [email or web address]   Phone: [phone]  Pager: [phone]  Fax: [phone]

## 2020-04-24 NOTE — PROGRESS NOTES
Clarifying past medication trials regarding antidepressants that the patient has tried and failed:  Prozac, Celexa, Zoloft, Effexor, Cymbalta, Remeron, Trinellex, and most recently Pristiq.     Baron Laughlin MD   Text Page

## 2020-04-25 VITALS
BODY MASS INDEX: 37.94 KG/M2 | HEART RATE: 100 BPM | RESPIRATION RATE: 16 BRPM | WEIGHT: 227.7 LBS | OXYGEN SATURATION: 95 % | TEMPERATURE: 98.1 F | HEIGHT: 65 IN | DIASTOLIC BLOOD PRESSURE: 69 MMHG | SYSTOLIC BLOOD PRESSURE: 99 MMHG

## 2020-04-25 PROCEDURE — 25000132 ZZH RX MED GY IP 250 OP 250 PS 637: Performed by: PSYCHIATRY & NEUROLOGY

## 2020-04-25 RX ADMIN — DESVENLAFAXINE SUCCINATE 100 MG: 25 TABLET, EXTENDED RELEASE ORAL at 09:15

## 2020-04-25 RX ADMIN — HYDROXYZINE HYDROCHLORIDE 50 MG: 25 TABLET, FILM COATED ORAL at 10:37

## 2020-04-25 RX ADMIN — LAMOTRIGINE 200 MG: 100 TABLET ORAL at 09:15

## 2020-04-25 RX ADMIN — LITHIUM CARBONATE 450 MG: 450 TABLET, EXTENDED RELEASE ORAL at 09:15

## 2020-04-25 RX ADMIN — BREXPIPRAZOLE 4 MG: 1 TABLET ORAL at 09:15

## 2020-04-25 ASSESSMENT — ACTIVITIES OF DAILY LIVING (ADL)
HYGIENE/GROOMING: INDEPENDENT;SHOWER
DRESS: INDEPENDENT;STREET CLOTHES
ORAL_HYGIENE: INDEPENDENT
LAUNDRY: WITH SUPERVISION

## 2020-04-25 NOTE — PLAN OF CARE
Patient's mood is depressed, but bright upon approach. She is excited to be discharging tomorrow. Insight is fair, she rather took the delay in her discharge positively. Patient denies suicidal ideation this evening. She has been more visible on the unit this shift. Patient said she is happy to have connected with her youngest son via telephone and reports the call being productive. Patient stated that the thought of her family made her consider not think about suicide. Plan likely discharge tomorrow 4/25/2020.

## 2020-04-25 NOTE — PROGRESS NOTES
Writer spoke to Harpreet RN supervisor at [facility]. Of note, pharmacy staff is still awaiting for prior authorization for Rexulti. Per Ter, they will be able to admit the patient with 14 days  supply of the medication since the patient already has an intake appointment with [facility] on 5/1/2020. Plan is for patient to be discharged tomorrow morning since [facility] does not have enough staff members after hours to accommodate her admission. Attending psychiatrist was notified and is in agreement with discharge to proceed as noted. Patient's medications are in her locker. Will need to arrange transportation in the morning.

## 2020-04-25 NOTE — PROGRESS NOTES
Pt has been withdrawn this shift but anxious for discharge. Pt will be discharging to [facility] in [place] and is looking forward to leaving. Pt is slightly more hopeful with her discharge plan and expressed some positivity. Pt admits to having some suicidal ideation but those thoughts more fleeting today and pt has no real plan. Pt has chronic SI and has been able to identify ways to distract herself and reframe her thinking. Pt is anxious given hydroxyzine 50 mg PRN and was assured by staff these are normal feelings for something that is new. Pt is pleasant and cooperative. Notified [facility] David that pt will be arriving before 1200 and will have a 30 day supply of medications except for the Rexulti which is under review for a PA.. [facility] is accepting of this as she has a future appointment scheduled for 5/1/20. Copy of AVS, Recent MAR and Provisional discharge agreement faxed to [facility] per request. Pt left with her copy of AVS and PD agreement.   Discharge instructions reviewed with patient including follow-up care plan. Educated on medication regime including review of name, dose, route, frequency, side effects, and next dose needed.  Advised not to stop prescribed medication without consulting their physician.  Receptive to instructions and teachings.  Copy of AVS given to patient. Coping skills and support network reviewed. All belongings which where brought into the hospital have been returned to patient. Escorted off the unit/ Picked up by CAB. Pt D/C'd at 1045 .

## 2020-04-28 NOTE — PROGRESS NOTES
Late entry:  The Provisional Discharge Agreement was faxed to [facility] this morning.

## 2020-05-01 ENCOUNTER — HOSPITAL ENCOUNTER (INPATIENT)
Facility: CLINIC | Age: 35
LOS: 7 days | Discharge: PSYCHIATRIC OR SUBSTANCE USE FACILITY | End: 2020-05-08
Attending: EMERGENCY MEDICINE | Admitting: PSYCHIATRY & NEUROLOGY
Payer: COMMERCIAL

## 2020-05-01 ENCOUNTER — MEDICAL CORRESPONDENCE (OUTPATIENT)
Dept: HEALTH INFORMATION MANAGEMENT | Facility: CLINIC | Age: 35
End: 2020-05-01

## 2020-05-01 DIAGNOSIS — F32.3 MAJOR DEPRESSIVE DISORDER, SINGLE EPISODE, SEVERE WITH PSYCHOTIC FEATURES (H): Primary | ICD-10-CM

## 2020-05-01 DIAGNOSIS — F51.01 PRIMARY INSOMNIA: ICD-10-CM

## 2020-05-01 DIAGNOSIS — G25.81 RESTLESS LEG SYNDROME: ICD-10-CM

## 2020-05-01 DIAGNOSIS — F32.A DEPRESSION, UNSPECIFIED DEPRESSION TYPE: ICD-10-CM

## 2020-05-01 DIAGNOSIS — R45.851 SUICIDAL IDEATION: ICD-10-CM

## 2020-05-01 DIAGNOSIS — F33.2 SEVERE EPISODE OF RECURRENT MAJOR DEPRESSIVE DISORDER, WITHOUT PSYCHOTIC FEATURES (H): ICD-10-CM

## 2020-05-01 DIAGNOSIS — F33.3 SEVERE RECURRENT MAJOR DEPRESSIVE DISORDER WITH PSYCHOTIC FEATURES (H): ICD-10-CM

## 2020-05-01 DIAGNOSIS — F60.3 BORDERLINE PERSONALITY DISORDER (H): ICD-10-CM

## 2020-05-01 LAB
ANION GAP SERPL CALCULATED.3IONS-SCNC: 9 MMOL/L (ref 3–14)
BASOPHILS # BLD AUTO: 0 10E9/L (ref 0–0.2)
BASOPHILS NFR BLD AUTO: 0.2 %
BUN SERPL-MCNC: 8 MG/DL (ref 7–30)
CALCIUM SERPL-MCNC: 9.3 MG/DL (ref 8.5–10.1)
CHLORIDE SERPL-SCNC: 108 MMOL/L (ref 94–109)
CO2 SERPL-SCNC: 22 MMOL/L (ref 20–32)
CREAT SERPL-MCNC: 0.81 MG/DL (ref 0.52–1.04)
DIFFERENTIAL METHOD BLD: ABNORMAL
EOSINOPHIL # BLD AUTO: 0.2 10E9/L (ref 0–0.7)
EOSINOPHIL NFR BLD AUTO: 1.2 %
ERYTHROCYTE [DISTWIDTH] IN BLOOD BY AUTOMATED COUNT: 14 % (ref 10–15)
GFR SERPL CREATININE-BSD FRML MDRD: >90 ML/MIN/{1.73_M2}
GLUCOSE SERPL-MCNC: 82 MG/DL (ref 70–99)
HCT VFR BLD AUTO: 41.8 % (ref 35–47)
HGB BLD-MCNC: 13.6 G/DL (ref 11.7–15.7)
IMM GRANULOCYTES # BLD: 0 10E9/L (ref 0–0.4)
IMM GRANULOCYTES NFR BLD: 0.2 %
LYMPHOCYTES # BLD AUTO: 1.8 10E9/L (ref 0.8–5.3)
LYMPHOCYTES NFR BLD AUTO: 14.5 %
MAGNESIUM SERPL-MCNC: 2.3 MG/DL (ref 1.6–2.3)
MCH RBC QN AUTO: 30.6 PG (ref 26.5–33)
MCHC RBC AUTO-ENTMCNC: 32.5 G/DL (ref 31.5–36.5)
MCV RBC AUTO: 94 FL (ref 78–100)
MONOCYTES # BLD AUTO: 0.6 10E9/L (ref 0–1.3)
MONOCYTES NFR BLD AUTO: 5.3 %
NEUTROPHILS # BLD AUTO: 9.5 10E9/L (ref 1.6–8.3)
NEUTROPHILS NFR BLD AUTO: 78.6 %
NRBC # BLD AUTO: 0 10*3/UL
NRBC BLD AUTO-RTO: 0 /100
PHOSPHATE SERPL-MCNC: 3.2 MG/DL (ref 2.5–4.5)
PLATELET # BLD AUTO: 367 10E9/L (ref 150–450)
POTASSIUM SERPL-SCNC: 4.3 MMOL/L (ref 3.4–5.3)
RBC # BLD AUTO: 4.45 10E12/L (ref 3.8–5.2)
SODIUM SERPL-SCNC: 139 MMOL/L (ref 133–144)
TSH SERPL DL<=0.005 MIU/L-ACNC: 3.35 MU/L (ref 0.4–4)
WBC # BLD AUTO: 12 10E9/L (ref 4–11)

## 2020-05-01 PROCEDURE — 36415 COLL VENOUS BLD VENIPUNCTURE: CPT | Performed by: PSYCHIATRY & NEUROLOGY

## 2020-05-01 PROCEDURE — 99285 EMERGENCY DEPT VISIT HI MDM: CPT | Mod: 25

## 2020-05-01 PROCEDURE — 80048 BASIC METABOLIC PNL TOTAL CA: CPT | Performed by: PSYCHIATRY & NEUROLOGY

## 2020-05-01 PROCEDURE — 84443 ASSAY THYROID STIM HORMONE: CPT | Performed by: PSYCHIATRY & NEUROLOGY

## 2020-05-01 PROCEDURE — 83735 ASSAY OF MAGNESIUM: CPT | Performed by: PSYCHIATRY & NEUROLOGY

## 2020-05-01 PROCEDURE — 99223 1ST HOSP IP/OBS HIGH 75: CPT | Mod: 95 | Performed by: PSYCHIATRY & NEUROLOGY

## 2020-05-01 PROCEDURE — 85025 COMPLETE CBC W/AUTO DIFF WBC: CPT | Performed by: PSYCHIATRY & NEUROLOGY

## 2020-05-01 PROCEDURE — 25000132 ZZH RX MED GY IP 250 OP 250 PS 637: Performed by: PSYCHIATRY & NEUROLOGY

## 2020-05-01 PROCEDURE — 12400000 ZZH R&B MH

## 2020-05-01 PROCEDURE — 90791 PSYCH DIAGNOSTIC EVALUATION: CPT

## 2020-05-01 PROCEDURE — 84100 ASSAY OF PHOSPHORUS: CPT | Performed by: PSYCHIATRY & NEUROLOGY

## 2020-05-01 RX ORDER — LITHIUM CARBONATE 450 MG
450 TABLET, EXTENDED RELEASE ORAL EVERY 12 HOURS SCHEDULED
Status: DISCONTINUED | OUTPATIENT
Start: 2020-05-01 | End: 2020-05-01

## 2020-05-01 RX ORDER — LAMOTRIGINE 100 MG/1
200 TABLET ORAL DAILY
Status: DISCONTINUED | OUTPATIENT
Start: 2020-05-01 | End: 2020-05-01 | Stop reason: CLARIF

## 2020-05-01 RX ORDER — DESVENLAFAXINE 25 MG/1
100 TABLET, EXTENDED RELEASE ORAL DAILY
Status: DISCONTINUED | OUTPATIENT
Start: 2020-05-01 | End: 2020-05-01 | Stop reason: CLARIF

## 2020-05-01 RX ORDER — HYDROXYZINE HYDROCHLORIDE 25 MG/1
25-50 TABLET, FILM COATED ORAL 3 TIMES DAILY PRN
Status: DISCONTINUED | OUTPATIENT
Start: 2020-05-01 | End: 2020-05-01 | Stop reason: CLARIF

## 2020-05-01 RX ORDER — MIRTAZAPINE 45 MG/1
45 TABLET, FILM COATED ORAL AT BEDTIME
Status: DISCONTINUED | OUTPATIENT
Start: 2020-05-01 | End: 2020-05-01 | Stop reason: CLARIF

## 2020-05-01 RX ORDER — LAMOTRIGINE 100 MG/1
200 TABLET ORAL DAILY
Status: DISCONTINUED | OUTPATIENT
Start: 2020-05-01 | End: 2020-05-08 | Stop reason: HOSPADM

## 2020-05-01 RX ORDER — LITHIUM CARBONATE 450 MG
450 TABLET, EXTENDED RELEASE ORAL EVERY 12 HOURS
Status: DISCONTINUED | OUTPATIENT
Start: 2020-05-01 | End: 2020-05-01 | Stop reason: CLARIF

## 2020-05-01 RX ORDER — PROTRIPTYLINE HYDROCHLORIDE 5 MG/1
5 TABLET, FILM COATED ORAL
Status: DISCONTINUED | OUTPATIENT
Start: 2020-05-01 | End: 2020-05-06

## 2020-05-01 RX ORDER — DESVENLAFAXINE 25 MG/1
100 TABLET, EXTENDED RELEASE ORAL DAILY
Status: DISCONTINUED | OUTPATIENT
Start: 2020-05-01 | End: 2020-05-01

## 2020-05-01 RX ORDER — RAMELTEON 8 MG/1
8 TABLET ORAL AT BEDTIME
Status: DISCONTINUED | OUTPATIENT
Start: 2020-05-01 | End: 2020-05-08 | Stop reason: HOSPADM

## 2020-05-01 RX ORDER — MIRTAZAPINE 45 MG/1
45 TABLET, FILM COATED ORAL AT BEDTIME
Status: DISCONTINUED | OUTPATIENT
Start: 2020-05-01 | End: 2020-05-01

## 2020-05-01 RX ORDER — OLANZAPINE 5 MG/1
5 TABLET, ORALLY DISINTEGRATING ORAL EVERY 6 HOURS PRN
Status: DISCONTINUED | OUTPATIENT
Start: 2020-05-01 | End: 2020-05-08 | Stop reason: HOSPADM

## 2020-05-01 RX ORDER — HYDROXYZINE HYDROCHLORIDE 25 MG/1
25-50 TABLET, FILM COATED ORAL 3 TIMES DAILY PRN
Status: DISCONTINUED | OUTPATIENT
Start: 2020-05-01 | End: 2020-05-08 | Stop reason: HOSPADM

## 2020-05-01 RX ORDER — RAMELTEON 8 MG/1
8 TABLET ORAL AT BEDTIME
Status: DISCONTINUED | OUTPATIENT
Start: 2020-05-01 | End: 2020-05-01 | Stop reason: CLARIF

## 2020-05-01 RX ORDER — TRAZODONE HYDROCHLORIDE 100 MG/1
100 TABLET ORAL
Status: DISCONTINUED | OUTPATIENT
Start: 2020-05-01 | End: 2020-05-06

## 2020-05-01 RX ORDER — ACETAMINOPHEN 325 MG/1
650 TABLET ORAL EVERY 4 HOURS PRN
Status: DISCONTINUED | OUTPATIENT
Start: 2020-05-01 | End: 2020-05-08 | Stop reason: HOSPADM

## 2020-05-01 RX ORDER — ZIPRASIDONE HYDROCHLORIDE 20 MG/1
20 CAPSULE ORAL 2 TIMES DAILY WITH MEALS
Status: DISCONTINUED | OUTPATIENT
Start: 2020-05-01 | End: 2020-05-04

## 2020-05-01 RX ADMIN — ZIPRASIDONE HYDROCHLORIDE 20 MG: 20 CAPSULE ORAL at 22:00

## 2020-05-01 RX ADMIN — RAMELTEON 8 MG: 8 TABLET ORAL at 22:00

## 2020-05-01 RX ADMIN — OLANZAPINE 5 MG: 5 TABLET, ORALLY DISINTEGRATING ORAL at 17:21

## 2020-05-01 ASSESSMENT — ACTIVITIES OF DAILY LIVING (ADL)
BATHING: 0-->INDEPENDENT
DRESS: 0-->INDEPENDENT
HYGIENE/GROOMING: INDEPENDENT
SWALLOWING: 0-->SWALLOWS FOODS/LIQUIDS WITHOUT DIFFICULTY
ORAL_HYGIENE: INDEPENDENT
RETIRED_EATING: 0-->INDEPENDENT
FALL_HISTORY_WITHIN_LAST_SIX_MONTHS: NO
LAUNDRY: WITH SUPERVISION
TRANSFERRING: 0-->INDEPENDENT
LAUNDRY: WITH SUPERVISION
DRESS: INDEPENDENT
HYGIENE/GROOMING: INDEPENDENT
DRESS: SCRUBS (BEHAVIORAL HEALTH)
TOILETING: 0-->INDEPENDENT
ORAL_HYGIENE: INDEPENDENT
RETIRED_COMMUNICATION: 0-->UNDERSTANDS/COMMUNICATES WITHOUT DIFFICULTY
AMBULATION: 0-->INDEPENDENT
COGNITION: 0 - NO COGNITION ISSUES REPORTED

## 2020-05-01 ASSESSMENT — MIFFLIN-ST. JEOR
SCORE: 1824.87
SCORE: 1699.68

## 2020-05-01 NOTE — ED NOTES
ED [facility] updated RN that placement verified pt transfer to St  at [facility] by Dr. Laughlin. [facility] instructed RN that 77 will call ED when they are ready for report.
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
## 2020-05-01 NOTE — ED NOTES
RN received call from Simon, staff at pt's living facility. Simon informed RN that pt refused her medications on the night of 4/30. RN informed Simon that admission seems likely, but will contact if that changes.

## 2020-05-01 NOTE — PHARMACY-ADMISSION MEDICATION HISTORY
Pharmacy Medication History  Admission medication history interview status for the 5/1/2020  admission is complete. See EPIC admission navigator for prior to admission medications     Medication history sources: List sent to ED by group home  Medication history source reliability: Good  Adherence assessment: Unable to determine    Significant changes made to the medication list:  None       Additional medication history information:   None    Medication reconciliation completed by provider prior to medication history? No    Time spent in this activity: 15 minutes      Prior to Admission medications    Medication Sig Last Dose Taking? Auth Provider   brexpiprazole (REXULTI) 3 MG tablet Take 1 tablet (3 mg) by mouth daily 4/29 at 12:09pm Yes Baron Laughlin MD   desvenlafaxine succinate (PRISTIQ) 100 MG 24 hr tablet Take 1 tablet (100 mg) by mouth daily 4/29 at 12:09pm Yes Baron Laughlin MD   hydrOXYzine (ATARAX) 25 MG tablet Take 1-2 tablets (25-50 mg) by mouth 3 times daily as needed for anxiety 4/27 at 10:05am Yes Baron Laughlin MD   lamoTRIgine (LAMICTAL) 200 MG tablet Take 1 tablet (200 mg) by mouth daily 4/29/2020 at 12:09pm Yes Baron aLughlin MD   lithium (ESKALITH CR/LITHOBID) 450 MG CR tablet Take 1 tablet (450 mg) by mouth every 12 hours 4/29 at 9:41pm Yes Baron Laughlin MD   mirtazapine (REMERON) 45 MG tablet Take 1 tablet (45 mg) by mouth At Bedtime 4/29 at 9:39pm Yes Baron Laughlin MD   ramelteon (ROZEREM) 8 MG tablet Take 1 tablet (8 mg) by mouth At Bedtime 4/29/2020 at 9:40pm Yes Baron Laughlin MD

## 2020-05-01 NOTE — PROGRESS NOTES
Circumstances prior to admission, per Jose Alberto at [facility]:  During an overnight shift, pt began expressing SI.   The night staff called [facility].  Pt refused to talk with the [facility] staff member.   So [facility] staff had to recommend to the [facility] night staff, that they call 911.     Today, Jose Alberto (and her supervisor) say pt is welcomed to come back and that pt probably did not need to be admitted to the hospital because [facility] knows pt has chronic SI.

## 2020-05-01 NOTE — ED NOTES
Hourly Round    Mood/Behavior: Resting quietly    Comment: Patient resting in bed, awake. Respirations WNL.        Constant Monitoring: Yes

## 2020-05-01 NOTE — PROGRESS NOTES
Addendum to my H&P from earlier today:  I spoke with the patient again via telemedicine services.  We were able to conduct a more extensive review of her past medication trials which I will add to my H&P.  I also use this information when reviewing her GeneSight testing prior to making recommendations for her next medication treatment plan.  We discussed initiating protriptyline to address symptoms of her mood and anxiety disorder and Geodon to address associated psychosis with additional benefit at addressing affective instability.  Both medications will be started this evening.  Risks and benefits of her medications were reviewed and the patient consented to treatment as outlined.

## 2020-05-01 NOTE — ED NOTES
Hourly Round    Mood/Behavior: resting    Comment: Resting awake in bed. Respirations WNL. EDT at bedside        Constant Monitoring: Yes

## 2020-05-01 NOTE — ED PROVIDER NOTES
"  History     Chief Complaint:  Suicidal      HPI   Misty Parham is a 34 year old female with a history of depression and suicidal ideation who presents to the emergency department via EMS for evaluation of suicidal ideation. The patient reports she has been dealing with auditory hallucinations and reported to staff at [facility] that the voices were getting louder and instructing her to kill herself. She has plan of wanting to hang herself.     Allergies:  No Known Drug Allergies    Medications:    Brexpiprazole   Pristiq  Hydroxyzine   Lamotrigine   Remeron   Rozerem      Past Medical History:    Major depressive disorder   Borderline personality disorder  Suicidal ideation  Insomnia      Past Surgical History:    Cholecystectomy      Family History:    Father - diabetes     Social History:  Smoking Status: Never Smoker  Smokeless Tobacco: Never Used  Alcohol Use: No  Drug Use: No  PCP: Monse Chen    Marital Status:  Legally     Review of Systems  +depression, SI  Denies illness, fever, cough  10 point review of systems was obtained and negative other than mentioned above.    Physical Exam     Patient Vitals for the past 24 hrs:   BP Temp Heart Rate Resp SpO2 Height Weight   05/01/20 0150 -- -- -- -- 98 % -- --   05/01/20 0148 (!) 139/99 98.7  F (37.1  C) 56 16 97 % 1.651 m (5' 5\") 113.4 kg (250 lb)       Physical Exam    General: Sitting up in bed  Eyes:  The pupils are equal and round    Conjunctivae and sclerae are normal  ENT:    Moist mucous membranes  Neck:  Normal range of motion  CV:  Bradycardic rate and regular rhythm    Skin warm and well perfused   Resp:  Non labored breathing on room air  MS:  Normal muscular tone  Skin:  No rash or acute skin lesions noted  Neuro:   Awake, alert.      Speech is normal and fluent.    Face is symmetric.     Moves all extremities equally  Psych: Flat affect, decreased eye contact    Emergency Department Course     Emergency " Department Course:  Past medical records, nursing notes, and vitals reviewed.    0200 I performed an exam of the patient as documented above.     0240 I consulted with DEC regarding the patient's history and presentation here in the emergency department.    0251 I rechecked the patient and discussed the results of her workup thus far.     Findings and plan explained to the Patient who consents to admission. Discussed the patient with DEC who recommended admission to a psych bed for further monitoring, evaluation, and treatment.     Impression & Plan     Medical Decision Making:  Misty Parham is a 34 year old female who presented to the ED with suicidal ideation. Patient with plans to hang herself. Recently discharged after extensive hospital stay. [facility] evaluated the patient and recommends psych admission. Agree with this plan. No suicide attempt at home or medical concerns in ED. Pt signed out to Dr. Seymour pending psych admission.    Diagnosis:    ICD-10-CM    1. Suicidal ideation  R45.851    2. Depression, unspecified depression type  F32.9        Disposition:  Admitted to psych.    Scribe Disclosure:  I, Deb Heath, am serving as a scribe at 1:49 AM on 5/1/2020 to document services personally performed by Juliana Maher MD based on my observations and the provider's statements to me.        Juliana Maher MD  05/01/20 0561

## 2020-05-01 NOTE — ED NOTES
Bed: ED16  Expected date: 5/1/20  Expected time:   Means of arrival:   Comments:  [id number] 34f psych, hallucinations cooperative

## 2020-05-01 NOTE — ED NOTES
Hourly Round    Mood/Behavior: Resting    Comment: Resting in bed, watching TV. Respirations WNL. EDT at bedside        Constant Monitoring: Yes

## 2020-05-01 NOTE — PROGRESS NOTES
05/01/20 1400   General Information   Has Not Attended OT as of: 05/01/20     Pt has not attended OT since admit.  Will continue to encourage participation and completion of  self-assessment as able. OT staff will explain the purpose of being involved with treatment plan and provide options to meet current needs and goals.

## 2020-05-01 NOTE — ED NOTES
Breakfast menu offered, pt declined at this time; left at bedside. Pt informed of bed availability on 77. Pt exhibiting flat affect, denies any further needs at this time. Pt states she will give UA when she feels she is able.

## 2020-05-01 NOTE — PLAN OF CARE
"BEHAVIORAL TEAM DISCUSSION    Participants: dr argueta, RN's, OT (cayetano kirby), CTC (coty medrano)  Progress: new admit.   She was admitted due to chronic SI.   [facility] staff called [facility], who told them to call 911.    Pt did not make suicide attempt  Anticipated length of stay: we hope to discharge patient on Monday May 4th - Her IRT's said they are willing to take her back then.  Continued Stay Criteria/Rationale:   Medical/Physical: no new issues  Precautions:   Behavioral Orders   Procedures    Code 1 - Restrict to Unit    [place]    Neuropsych Testing     Please contact the [facility] group for the consultation and testing.  Reason for consultation: Personality assessment and second opinion on diagnosis    Routine Programming     As clinically indicated    Self Injury Precaution    Status 15     Every 15 minutes.    Status 1:1     Constant visual observations     Plan: meds per dr argueta.   -We will NOT be revoking pt's Provisional Discharge.   Her Manjeet Irvin CM is in agreement.  -Pt will be discharged back to [facility] location.    :  \"Jose Alberto\" at [phone]  Rationale for change in precautions or plan: no change at this time        "
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
## 2020-05-01 NOTE — ED TRIAGE NOTES
[facility]  ED Arrival Note      Means of Arrival: EMS    Story: Patient has been dealing with auditory hallucinations. Reported to staff at [facility] that the voices are getting louder and are telling her to commit suicide.         Current behavior: Quiet, Sad and Withdrawn        Safety Concerns: Suicidal with a plan to strangle herself.     Legal Hold Status: [facility]    Constant Monitoring, Q15: Yes    Patient therapeutically Searched: Yes    Patient changed into scrubs: Yes    Belongings: Sealed and put in locker per unit protocol    Video Observation initiated and patient informed: Yes

## 2020-05-01 NOTE — ED NOTES
Hourly Round    Mood/Behavior: resting    Comment: Pt resting in bed. Respirations WNL. EDT at bedside        Constant Monitoring: Yes

## 2020-05-01 NOTE — PLAN OF CARE
Problem: Adult Behavioral Health Plan of Care  Goal: Patient-Specific Goal (Individualization)  5/1/2020 1739 by Saad Carbone RN  Note: Pt has been isolative to her room through most of the shift. Pt has ana stating she is not going to eat or drink stating she wants to feel some pain. Pt also endorses auditory hallucinations of voices telling her to kill herself. Pt appeared tearful and feeling distraught. Dr. Laughlin notified and PRN FAYEyprexa was ordered as to help with some of the voices. Pt was able to drink some 240 ml of juice tonight. Pt refused dinner. Pt has SI but is able to contract for safety. Nursing to continue to monitor.

## 2020-05-01 NOTE — ED NOTES
Patient reports that she had a plan to strangle herself. The room has been cleared of all equipment. EDT doing 1:1 sit within arms reach. In line for DEC.

## 2020-05-01 NOTE — H&P
[facility]    History and Physical - Hospitalist Service       Date of Admission:  5/1/2020    Assessment & Plan   Misty Parham is a 34 year old female with a past medical history of depression, anxiety, and borderline personality disorder admitted on 5/1/2020 with suicidal ideations. She was recently admitted to [facility] inpatient psychiatry in February for ECT treatments.    Suicidal Ideation  Depression/Anxeity  Borderline Personality Disorder  Has as history of multiple psychiatric admissions. She was last admitted admitted to [facility] psychiatry in 2/21/2020-4/25/2020 for ECT and civil commitment. She was discharged at that time to [facility]. She has struggled with depression since age 11.  She reports she stopped taking her medications a couple days ago due to increased tremor. She reports auditory hallucinations and reports to staff at [facility] that the voices were getting louder instructing her to kill herself. She has a plan of wanting to hang herself. She also reports she intentional stopped eating a couple days ago with intent to inflict pain on herself, electrolytes are unremarkable.  Endorses ongoing mood instability and inability to sleep.  -Defer management to psychiatry team    Mild Leukocytosis  WBC 12  Patient denies fever, chills, shortness of breath, cough, sore throat, nausea, vomiting, diarrhea, urinary symptoms, dizziness, lightheadedness, chest pain, or palpations  Patient asymptomatic from infectious standpoint  -If patient develops fevers or new symptoms please reconsult hospitalist service     Diet: Regular Diet Adult    DVT Prophylaxis: Low Risk/Ambulatory with no VTE prophylaxis indicated  Allen Catheter: not present  Code Status: Full Code      Disposition Plan   Expected discharge: defer to psychiatry.  Entered: WILLIAN Lomax CNP 05/01/2020, 2:48 PM     The patient's care was discussed with the  Attending Physician, Dr. Natarajan.    Hina Mata, WILLIAN [facility]    ______________________________________________________________________    Chief Complaint   Suicidal Ideation    History is obtained from the patient and electronic health record    History of Present Illness   Misty Parham is a 34 year old female with a past medical history of depression, anxiety, and borderline personality disorder admitted on 5/1/2020 with suicidal ideations. She was recently admitted to [facility] inpatient psychiatry in February for ECT treatments. She has as history of multiple psychiatric admissions. She was last admitted admitted to [facility] psychiatry in 2/21/2020-4/25/2020 for ECT and civil commitment. She was discharged at that time to [facility] where she reports she socially isolated and did not attend any group therapies. She has struggled with depression since age 11.  She reports she stopped taking her medications a couple days ago due to increased tremor. She reports auditory hallucinations and reports to staff at [facility] that the voices were getting louder instructing her to kill herself. She has a plan of wanting to hang herself. She also reports she intentional stopped eating a couple days ago to inflict pain on herself.  She denies fever, chills, shortness of breath, cough, sore throat, nausea, vomiting, diarrhea, urinary symptoms, dizziness, lightheadedness, chest pain, or palpations.    Review of Systems    The 10 point Review of Systems is negative other than noted in the HPI or here.     Past Medical History    I have reviewed this patient's medical history and updated it with pertinent information if needed.   Past Medical History:   Diagnosis Date     Depressive disorder        Past Surgical History   I have reviewed this patient's surgical history and updated it with pertinent information if needed.  Past Surgical  History:   Procedure Laterality Date     CHOLECYSTECTOMY         Social History   I have reviewed this patient's social history and updated it with pertinent information if needed.  Social History     Tobacco Use     Smoking status: Never Smoker     Smokeless tobacco: Never Used   Substance Use Topics     Alcohol use: Not Currently     Frequency: Never     Drug use: Never       Family History   I have reviewed this patient's family history and updated it with pertinent information if needed.   Family History   Problem Relation Age of Onset     Diabetes Father      Prior to Admission Medications   Prior to Admission Medications   Prescriptions Last Dose Informant Patient Reported? Taking?   brexpiprazole (REXULTI) 3 MG tablet 4/29 at 12:09pm Care Giver Yes Yes   Sig: Take 1 tablet (3 mg) by mouth daily   desvenlafaxine succinate (PRISTIQ) 100 MG 24 hr tablet 4/29 at 12:09pm Care Giver No Yes   Sig: Take 1 tablet (100 mg) by mouth daily   hydrOXYzine (ATARAX) 25 MG tablet 4/27 at 10:05am Care Giver No Yes   Sig: Take 1-2 tablets (25-50 mg) by mouth 3 times daily as needed for anxiety   lamoTRIgine (LAMICTAL) 200 MG tablet 4/29/2020 at 12:09pm Care Giver No Yes   Sig: Take 1 tablet (200 mg) by mouth daily   lithium (ESKALITH CR/LITHOBID) 450 MG CR tablet 4/29 at 9:41pm Care Giver No Yes   Sig: Take 1 tablet (450 mg) by mouth every 12 hours   mirtazapine (REMERON) 45 MG tablet 4/29 at 9:39pm Care Giver No Yes   Sig: Take 1 tablet (45 mg) by mouth At Bedtime   ramelteon (ROZEREM) 8 MG tablet 4/29/2020 at 9:40pm Care Giver No Yes   Sig: Take 1 tablet (8 mg) by mouth At Bedtime      Facility-Administered Medications: None     Allergies   No Known Allergies    Physical Exam   Vital Signs: Temp: 98.5  F (36.9  C) Temp src: Oral BP: (!) 136/92 Pulse: 90 Heart Rate: 56 Resp: 16 SpO2: 98 % O2 Device: None (Room air)    Weight: 222 lbs 6.4 oz    Constitutional: Awake, alert, cooperative, no apparent distress.  Eyes: Conjunctiva  and pupils examined and normal.  HEENT: Moist mucous membranes, normal dentition.  Respiratory: Clear to auscultation bilaterally, no crackles or wheezing.  Cardiovascular: Regular rate and rhythm, normal S1 and S2, and no murmur noted.  GI: Soft, non-distended, non-tender, normal bowel sounds.  Skin: No rashes, no cyanosis, no edema.  Musculoskeletal: No joint swelling, erythema or tenderness.  Neurologic: Cranial nerves 2-12 intact, normal strength and sensation.  Psychiatric: Depressed and withdrawan.    Data   Data reviewed today: I reviewed all medications, new labs and imaging results over the last 24 hours. I personally reviewed no images or EKG's today.    Recent Labs   Lab 05/01/20  1205   WBC 12.0*   HGB 13.6   MCV 94         POTASSIUM 4.3   CHLORIDE 108   CO2 22   BUN 8   CR 0.81   ANIONGAP 9   CRISTINE 9.3   GLC 82     No results found for this or any previous visit (from the past 24 hour(s)).

## 2020-05-01 NOTE — PROGRESS NOTES
"Attempted to assess pt cognition with [facility] Cognitive screen per MD request, however, pt declined despite MAX encouragement and education on importance, stating, \"I haven't slept in 4 days\" and \"I'm really tired.\" Will re-attempt Monday, 5/4/20.   "

## 2020-05-01 NOTE — H&P
"[facility]  Psychiatric History and Physical      Patient name: Misty Parham   MRN: [id number]    Age: 34 year old    YOB: 1985    Identifying information:   The patient is a 34 year old  female    Chief complaint:  \"The medicine is not helping, nothing helps, I just want to die.\"    History of present illness: The patient has a history of major depressive disorder who was readmitted to our behavioral health unit after being discharged 6 days ago and transitioning into the [facility] treatment [facility].  While there, she states that she isolated to herself and did not attend groups.  She began to intermittently refuse her psychotropic medications suspecting that her medications were not helpful.  Her depressive symptoms intensified and she began to experience auditory hallucinations described as \"a voice keeps telling me to kill myself on 1 side of my head and on the other side I hear the voice of my children telling me to stay strong and fight through it, and in the middle I hear another voice telling me to just do it and get it over with.\"  She contemplated ways of how to commit suicide and decided that the only mode she could access would be through strangulation.  She began to write what she refers to as a \"death letter\" which she described as a goodbye notes to her children and family while preparing to commit suicide.  Staff at treatment facility attempted to keep her safe however her symptoms continue to intensify eventually leading to her referral back to our behavioral health unit.  Since admission, she has been refusing to eat or take medications.  On examination, she adds that she has not slept well for at least 4 nights, with difficulty initiating and maintaining sleep, tossing and turning throughout the night.  She described feeling helpless and hopeless.  She also adds that she was experiencing a fine hand tremor which led to her " "discontinuing all of her medications as she did not know which one could be contributing to that symptom.  Her hand tremor improved after stopping her medications however her depressive symptoms and hallucinations intensified.  She prefers not to restart ECT however seemed open to taking antidepressant medications.  \"I have taken so many things, I do not think anything is going to work.\"  She did report that the intensive residential treatment facility was mostly a positive experience.  She found staff to be supportive however she could not overcome her depressive symptoms to take advantage of the therapeutic programming.  She seemed open to resuming that treatment mode in the future.    Psychiatric Review of Systems:    -- Depressive episode: Mood is depressed, characterized as severe, accompanied with low energy, low appetite, anhedonia, feels helpless and hopeless.  Suicidal thoughts are reported.  She was able to contract for safety in the hospital.  She could confidently state that she would commit suicide if discharged from the hospital today.  No homicidal thoughts reported.  -- Trixie:  denies symptoms  -- Psychosis: She described auditory hallucinations commanding and influencing suicidal thoughts.  No paranoid delusions or other delusions identified.  -- Anxiety: Endorsed symptoms corresponding to IVY, frequent worrier, present for a number of years, occasionally escalating to the point of panic.  -- PTSD: denies symptoms  -- OCD: denies  symptoms  -- Eating disorder: denies symptoms    Psychiatric History:    The patient has an established diagnosis of major depressive disorder and suspected borderline personality disorder.  She has a history of self-injurious behavior through superficial scratching during moments of emotional intensity.  She has been hospitalized numerous times in the past, both through [facility] as well as [facility].  She is currently under involuntary commitment for " treatment of mental illness with a Price Sr order.  She has had trials of ECT in the past with her most recent occurring during her last hospitalization.  She has tried numerous medication trials, some of which have included Zoloft, Prozac, Lexapro, Paxil, Effexor, Pristiq, Remeron, Wellbutrin, Trintellix, and nortriptyline.  Her most recent combination included Pristiq, lithium, Remeron, Lamictal, and Rexulti.    Substance Use History:    Denies using illicit substances or alcohol corresponding to a diagnosis of abuse or dependence. No prior chemical dependency treatments reported.    Medical History:  Defer to internal medicine consult;    No current facility-administered medications on file prior to encounter.   brexpiprazole (REXULTI) 3 MG tablet, Take 1 tablet (3 mg) by mouth daily  desvenlafaxine succinate (PRISTIQ) 100 MG 24 hr tablet, Take 1 tablet (100 mg) by mouth daily  hydrOXYzine (ATARAX) 25 MG tablet, Take 1-2 tablets (25-50 mg) by mouth 3 times daily as needed for anxiety  lamoTRIgine (LAMICTAL) 200 MG tablet, Take 1 tablet (200 mg) by mouth daily  lithium (ESKALITH CR/LITHOBID) 450 MG CR tablet, Take 1 tablet (450 mg) by mouth every 12 hours  mirtazapine (REMERON) 45 MG tablet, Take 1 tablet (45 mg) by mouth At Bedtime  ramelteon (ROZEREM) 8 MG tablet, Take 1 tablet (8 mg) by mouth At Bedtime      Social History:  Refer to the psychosocial assessment completed by our .  She is  however  from her .  She has 3 children who are living with family members at the moment.  The patient's most recent place of residence was with her mother.  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  No legal issues reported.     Family History:    Notable for depression and possible substance use disorders involving alcohol.  No knowledge of completed suicides in the family.    Medical review of systems: 10 systems were  "reviewed and positive for psychiatric symptoms as noted above otherwise negative    Physical Exam:    B/P: 136/92, T: 98.5, P: 90, R: 16  Estimated body mass index is 37.01 kg/m  as calculated from the following:    Height as of this encounter: 1.651 m (5' 5\").    Weight as of this encounter: 100.9 kg (222 lb 6.4 oz).    The rest of the physical examination was reviewed in the emergency room note completed by the emergency room physician.      Mental status examination:  Appearance:  Alert, fair hygiene, no acute distress  Attitude:  Attempts to be cooperative  Eye Contact: Fair  Mood:  Depressed  Affect: Mood congruent and blunted  Speech: Soft volume, limited content.  Psychomotor Behavior:  No psychomotor abnormalities noted  Thought Process: Linear and logical; not tangential or circumstantial or disorganized  Associations:  Logical; no loose associations Noted  Thought Content:  No obvious paranoia, delusions, ideas of reference, or grandiosity noted.  Endorsed auditory hallucinations however did not appear to be responding to psychotic stimuli.  No visual hallucinations reported.  Endorsed suicidal Ideations. Denies homicidal ideations.  Insight: Partial  Judgment: Limited  Oriented to:  time, person, and place  Attention Span and Concentration:  Intact  Recent and Remote Memory: Intact based on interviewing and details provided  Language: Appropriate based on interviewing  Fund of Knowledge: Appropriate based on interviewing  Muscle Strength and Tone: Normal upon visual inspection  Gait and Station: Normal upon visual inspection            Diagnoses:    Major depressive disorder-recurrent, severe with psychotic features  Generalized anxiety disorder  Suspect borderline personality disorder     Plan:  1.  The patient has been admitted to our behavioral health unit under voluntary status for reports of depressed mood and suicidal thoughts. Treatment will be continued voluntarily.    2.  The patient did not seem " very hopeful regarding resuming her most recent medication plan and may have been experiencing side effects to lithium eventually leading to discontinuation.  I will provide the patient with a handout to complete to assist in outlining past medication trials she is able to recall.  She does mention that she might have difficulty recalling all the medications that she has tried.  After reviewing her medication history, I will incorporate results of her recent GeneSight testing, and recommend a medication plan to address symptoms of her depressive disorder which have been refractory to treatment so far.  As she is still rebounding from the cognitive side effects of her most recent trial of ECT, I will attempt to refrain from resuming ECT if possible, however will maintain a low threshold to resume it if needed to maintain the patient's wellbeing.    3. Psychosocial treatments to be addressed with social work consult and groups.    4.  Disposition: Anticipate transitioning back into intensive residential treatment services after demonstrating adequate improvement in mood and reduction of suicidal thoughts.

## 2020-05-01 NOTE — ED NOTES
DEC completed. Patient has been crying in the room. Tissues provided. EDT at bedside within arms reach.

## 2020-05-01 NOTE — PROGRESS NOTES
05/01/20 1215   Patient Belongings   Did you bring any home meds/supplements to the hospital?  No   Patient Belongings locker   Patient Belongings Put in Hospital Secure Location (Security or Locker, etc.) clothing;cell phone/electronics   Belongings Search Yes   Clothing Search Yes   Second Staff Ivana     Cell phone  Sweat pants with no strings  Purple shirt  Shoes with no laces    Admission:  I am responsible for any personal items that are not sent to the safe or pharmacy. [facility] is not responsible for loss, theft or damage of any property in my possession.        Patient Signature: ___________________________________________      Date/Time:__________________________     Staff Signature: __________________________________      Date/Time:__________________________     [facility] Staff person, if patient is unable/unwilling to sign:      __________________________________________________________      Date/Time: __________________________        Discharge:  [facility] has returned all of my personal belongings:     Patient Signature: ________________________________________     Date/Time: ____________________________________     Staff Signature: ______________________________________     Date/Time:_____________________________________

## 2020-05-02 LAB
CHOLEST SERPL-MCNC: 123 MG/DL
HDLC SERPL-MCNC: 40 MG/DL
LDLC SERPL CALC-MCNC: 66 MG/DL
NONHDLC SERPL-MCNC: 83 MG/DL
TRIGL SERPL-MCNC: 85 MG/DL

## 2020-05-02 PROCEDURE — 25000132 ZZH RX MED GY IP 250 OP 250 PS 637: Performed by: PSYCHIATRY & NEUROLOGY

## 2020-05-02 PROCEDURE — 12400000 ZZH R&B MH

## 2020-05-02 PROCEDURE — 36415 COLL VENOUS BLD VENIPUNCTURE: CPT | Performed by: PSYCHIATRY & NEUROLOGY

## 2020-05-02 PROCEDURE — 80061 LIPID PANEL: CPT | Performed by: PSYCHIATRY & NEUROLOGY

## 2020-05-02 RX ADMIN — PROTRIPTYLINE HYDROCHLORIDE 5 MG: 5 TABLET ORAL at 17:36

## 2020-05-02 RX ADMIN — LAMOTRIGINE 200 MG: 100 TABLET ORAL at 08:20

## 2020-05-02 RX ADMIN — RAMELTEON 8 MG: 8 TABLET ORAL at 22:04

## 2020-05-02 RX ADMIN — ZIPRASIDONE HYDROCHLORIDE 20 MG: 20 CAPSULE ORAL at 17:36

## 2020-05-02 RX ADMIN — ZIPRASIDONE HYDROCHLORIDE 20 MG: 20 CAPSULE ORAL at 08:20

## 2020-05-02 ASSESSMENT — ACTIVITIES OF DAILY LIVING (ADL)
ORAL_HYGIENE: INDEPENDENT
HYGIENE/GROOMING: INDEPENDENT
HYGIENE/GROOMING: INDEPENDENT
DRESS: INDEPENDENT
DRESS: SCRUBS (BEHAVIORAL HEALTH);INDEPENDENT
ORAL_HYGIENE: INDEPENDENT
LAUNDRY: WITH SUPERVISION

## 2020-05-02 NOTE — PLAN OF CARE
Patient pleasant and cooperative.States that last night was first night she felt like she slept. Compliant with medications. Rested in room after breakfast.

## 2020-05-03 PROCEDURE — 25000132 ZZH RX MED GY IP 250 OP 250 PS 637: Performed by: PSYCHIATRY & NEUROLOGY

## 2020-05-03 PROCEDURE — 12400000 ZZH R&B MH

## 2020-05-03 RX ADMIN — RAMELTEON 8 MG: 8 TABLET ORAL at 22:01

## 2020-05-03 RX ADMIN — ZIPRASIDONE HYDROCHLORIDE 20 MG: 20 CAPSULE ORAL at 17:18

## 2020-05-03 RX ADMIN — ZIPRASIDONE HYDROCHLORIDE 20 MG: 20 CAPSULE ORAL at 09:09

## 2020-05-03 RX ADMIN — PROTRIPTYLINE HYDROCHLORIDE 5 MG: 5 TABLET ORAL at 17:18

## 2020-05-03 RX ADMIN — LAMOTRIGINE 200 MG: 100 TABLET ORAL at 09:09

## 2020-05-03 ASSESSMENT — ACTIVITIES OF DAILY LIVING (ADL)
DRESS: SCRUBS (BEHAVIORAL HEALTH);INDEPENDENT
DRESS: SCRUBS (BEHAVIORAL HEALTH)
HYGIENE/GROOMING: INDEPENDENT
LAUNDRY: WITH SUPERVISION
ORAL_HYGIENE: INDEPENDENT
LAUNDRY: WITH SUPERVISION
DRESS: SCRUBS (BEHAVIORAL HEALTH)
ORAL_HYGIENE: INDEPENDENT
ORAL_HYGIENE: INDEPENDENT
HYGIENE/GROOMING: INDEPENDENT
LAUNDRY: WITH SUPERVISION
HYGIENE/GROOMING: INDEPENDENT

## 2020-05-03 ASSESSMENT — MIFFLIN-ST. JEOR: SCORE: 1712.83

## 2020-05-03 NOTE — PLAN OF CARE
"Shift Update: Pt mood is depressed. Thought process is intact. Insight is poor. Pt endorses suicidal ideation. Recent Med changes  see MAR. Pt states she feels\" a little better\" and has better eye contact.She states to this writer that when she is discharged we will not see her again because she is going to shoot herself. This writer asked if she has any access to guns and she said no. She told this writer that she is tired of feeling so anxious and shy and that she has always had  social anxiety even as a young child and that she did not have any friends because of this.Pt filled out the [place] and has a slightly brighter affect when communicating.  "

## 2020-05-03 NOTE — PLAN OF CARE
Pt presents with depressed mood and flat affect. Pt insight and thought process poor. Bed resting all of shift, came out only for meals and medications. Speaks minimally and only responds with one word. Endorses SI but contracts for safety.

## 2020-05-04 PROCEDURE — 12400000 ZZH R&B MH

## 2020-05-04 PROCEDURE — 99207 ZZC CDG-MDM COMPONENT: MEETS MODERATE - UP CODED: CPT | Performed by: PSYCHIATRY & NEUROLOGY

## 2020-05-04 PROCEDURE — 25000132 ZZH RX MED GY IP 250 OP 250 PS 637: Performed by: PSYCHIATRY & NEUROLOGY

## 2020-05-04 PROCEDURE — 99233 SBSQ HOSP IP/OBS HIGH 50: CPT | Mod: 95 | Performed by: PSYCHIATRY & NEUROLOGY

## 2020-05-04 RX ORDER — ZIPRASIDONE HYDROCHLORIDE 40 MG/1
40 CAPSULE ORAL 2 TIMES DAILY WITH MEALS
Status: DISCONTINUED | OUTPATIENT
Start: 2020-05-04 | End: 2020-05-08 | Stop reason: HOSPADM

## 2020-05-04 RX ADMIN — ZIPRASIDONE HCL 40 MG: 40 CAPSULE ORAL at 17:54

## 2020-05-04 RX ADMIN — RAMELTEON 8 MG: 8 TABLET ORAL at 21:15

## 2020-05-04 RX ADMIN — ZIPRASIDONE HYDROCHLORIDE 20 MG: 20 CAPSULE ORAL at 09:02

## 2020-05-04 RX ADMIN — PROTRIPTYLINE HYDROCHLORIDE 5 MG: 5 TABLET ORAL at 17:54

## 2020-05-04 RX ADMIN — LAMOTRIGINE 200 MG: 100 TABLET ORAL at 09:02

## 2020-05-04 RX ADMIN — OLANZAPINE 5 MG: 5 TABLET, ORALLY DISINTEGRATING ORAL at 21:15

## 2020-05-04 ASSESSMENT — ACTIVITIES OF DAILY LIVING (ADL)
ORAL_HYGIENE: INDEPENDENT
DRESS: INDEPENDENT
ORAL_HYGIENE: INDEPENDENT
LAUNDRY: WITH SUPERVISION
HYGIENE/GROOMING: INDEPENDENT
DRESS: INDEPENDENT
LAUNDRY: WITH SUPERVISION
HYGIENE/GROOMING: INDEPENDENT

## 2020-05-04 NOTE — PROGRESS NOTES
called this morning and left a message with Jose Alberto in admissions at [facility] ([phone]) to determine how long they would hold patient's bed while she is in the hospital.  awaiting call back.      received a call this afternoon from Eli Gage at [facility] ([phone]). She stated that Jose Alberto was sick today, so she is covering for her.  updated her on the current plan for patient and then asked how long they could hold the bed. She stated that typically they can only hold beds for a few days. She stated that she would be willing to hold patient's bed until Friday, and that she will call and speak with  on Friday to get an update on any discharge plans for patient at that time.      received a call this morning from patient's [facility] , Samreen Machuca ([phone]), requesting an update on patient.  reviewed plan discussed by psychiatrist in team meeting today.  discussed that psychiatrist is not planning on resuming ECT treatments at this time.  Patient's [facility]  stated that she had spoken with Carly at [employer] [facility] earlier today. She is unsure if patient is still being covered by [facility] since she has [place] Medicaid in place. [facility].      received a call this morning from Carly, who is a care coordinator with [facility] ([phone]). She stated that she is trying to determine patient's insurance coverage for this hospitalization with her team at [facility]. She stated that she was under the impression that patient's BCBS had terminated as of 04/03/2020 since Medicaid has been in place since 03/01/2020. However the current hospital stay had popped up as active for her. She stated that she will continue trying to determine  whether or not patient's insurance should still be active. She stated that as of now, she will remain available to help with any discharge disposition if needed.

## 2020-05-04 NOTE — PROGRESS NOTES
Assessed pt cognition with [facility] per MD request with pt scoring 24/30 indicating slightly below normal cognition. Pt demo'd mild deficits in short term memory and visuospatial skills. This result is significantly improved from scores on last admission where pt's highest score was 19/30 taken on 4/23/20.

## 2020-05-04 NOTE — PLAN OF CARE
Pt presents with calm mood and flat affect, brightens upon approach. Presents as less depressed and is more talkative; making her needs known. Attended unit programming. Showered this shift. Med compliant and contracts for safety.

## 2020-05-04 NOTE — PROGRESS NOTES
"[facility]  Psychiatric Progress Note    Length of stay (days): 3        Interim History:   The patient's care was discussed with the treatment team during the daily team meeting and/or staff's chart notes were reviewed.  Staff report: no acute issues over night.  Mostly isolating to herself.  Reporting suicidal ideation.    Depression severity scale 0-10 (10=most severe):  Today: 9    Auditory hallucinations continue to be present, reporting a severity score of 7 on a scale of 0-10 with 10 being most severe.  Content involves derogatory comments and command hallucinations to harm herself.    Mood remains depressed and characterized as severe.  Suicidal thoughts continue and she is not able to contract for safety if discharged from the hospital.  She reported some difficulty sleeping over the weekend due to vivid dreams.  Otherwise she denied any other side effects to her medications.  Energy is low, appetite is low, concentration is poor, anhedonia is reported, patient feels helpless and hopeless.           Medications:       lamoTRIgine  200 mg Oral Daily     protriptyline  5 mg Oral Daily with supper     ramelteon  8 mg Oral At Bedtime     ziprasidone  20 mg Oral BID w/meals          Allergies:   No Known Allergies       Labs:   No results found for this or any previous visit (from the past 24 hour(s)).       Psychiatric Examination:     BP 92/61   Pulse 65   Temp 98  F (36.7  C) (Oral)   Resp 16   Ht 1.651 m (5' 5\")   Wt 102.2 kg (225 lb 4.8 oz)   SpO2 96%   BMI 37.49 kg/m    Weight is 225 lbs 4.8 oz  Body mass index is 37.49 kg/m .  Orthostatic Vitals     None            Appearance: awake, alert  Attitude:  Mostly passive  Eye Contact:  poor   Mood:  depressed  Affect:  intensity is blunted  Speech:  decreased prosody  Psychomotor Behavior:  no evidence of tardive dyskinesia, dystonia, or tics  Throught Process:  linear  Associations:  no loose associations  Thought Content:  active " suicidal ideation present and auditory hallucinations present  Insight:  partial  Judgement:  fair  Oriented to:  time, person, and place  Attention Span and Concentration:  intact  Recent and Remote Memory:  fair    Clinical Global Impressions  First:     Most recent:            Precautions:     Behavioral Orders   Procedures     Code 1 - Restrict to Unit     [place]     Neuropsych Testing     Please contact the [facility] group for the consultation and testing.  Reason for consultation: Personality assessment and second opinion on diagnosis     Routine Programming     As clinically indicated     Self Injury Precaution     Status 15     Every 15 minutes.     Status 1:1     Constant visual observations          DIagnoses:     Major depressive disorder-recurrent, severe with psychotic features  Generalized anxiety disorder  Suspect borderline personality disorder         Plan:     Continue protriptyline targeting depressive symptoms.  Monitoring response while anticipating increasing the dose in the next 1 to 2 days.  Increase Geodon to 40 mg twice a day to address psychotic symptoms.  Continue lamotrigine while anticipating increasing the dose to optimize effectiveness.  Serum level checked during her recent hospitalization revealed that there is room to increase the dose safely.    Psychosocial treatments to be addressed with social work consult and groups.  -Psychology consultation has been requested for neuropsychological testing to further explore personality characteristics which may be complicating her treatment course while gaining a second opinion on her diagnosis to ensure optimal treatment planning.    Legal Status: voluntary    Disposition: Anticipate pursuing intensive residential treatment services after she has demonstrated adequate improvement in mood and reduction of suicidal thoughts.     Visit/Communication Style   VIRTUAL (VIDEO) communication was used to evaluate Misty due to the COVID pandemic.     Misty consented to the use of video communication: yes  Video START time: 11:43 AM  Video STOP time: 11:51 AM  Patient's location: [facility]   Provider's location during the visit: Home

## 2020-05-04 NOTE — PLAN OF CARE
Problem: Adult Behavioral Health Plan of Care  Goal: Patient-Specific Goal (Individualization)  Outcome: No Change  Note: Pt has been spending time mostly in her room bed resting. Pt talked about hearing voices and that they keep telling her to fake it so she can get out and kill herself. Pt though states she is trying to fight off those voices. Pt is pleasant and cooperative. Pt is brighter in her affect and is more alert. Pt has been unable to talk to her kids today but will again try later. Pt encouraged to stay positive and utilize those supports that help her feels better. Pt is pleasant and cooperative. Continue with tx plan. Nursing to continue to monitor.

## 2020-05-05 PROCEDURE — 99207 ZZC CDG-MDM COMPONENT: MEETS MODERATE - UP CODED: CPT | Performed by: PSYCHIATRY & NEUROLOGY

## 2020-05-05 PROCEDURE — 99233 SBSQ HOSP IP/OBS HIGH 50: CPT | Mod: 95 | Performed by: PSYCHIATRY & NEUROLOGY

## 2020-05-05 PROCEDURE — 12400000 ZZH R&B MH

## 2020-05-05 PROCEDURE — 25000132 ZZH RX MED GY IP 250 OP 250 PS 637: Performed by: PSYCHIATRY & NEUROLOGY

## 2020-05-05 RX ORDER — ROPINIROLE 1 MG/1
1 TABLET, FILM COATED ORAL AT BEDTIME
Status: DISCONTINUED | OUTPATIENT
Start: 2020-05-05 | End: 2020-05-08 | Stop reason: HOSPADM

## 2020-05-05 RX ADMIN — TRAZODONE HYDROCHLORIDE 100 MG: 100 TABLET ORAL at 22:32

## 2020-05-05 RX ADMIN — PROTRIPTYLINE HYDROCHLORIDE 5 MG: 5 TABLET ORAL at 17:49

## 2020-05-05 RX ADMIN — ZIPRASIDONE HCL 40 MG: 40 CAPSULE ORAL at 10:03

## 2020-05-05 RX ADMIN — LAMOTRIGINE 200 MG: 100 TABLET ORAL at 10:03

## 2020-05-05 RX ADMIN — ROPINIROLE HYDROCHLORIDE 1 MG: 1 TABLET, FILM COATED ORAL at 22:28

## 2020-05-05 RX ADMIN — ZIPRASIDONE HCL 40 MG: 40 CAPSULE ORAL at 17:49

## 2020-05-05 RX ADMIN — RAMELTEON 8 MG: 8 TABLET ORAL at 22:27

## 2020-05-05 ASSESSMENT — ACTIVITIES OF DAILY LIVING (ADL)
HYGIENE/GROOMING: INDEPENDENT
LAUNDRY: WITH SUPERVISION
DRESS: SCRUBS (BEHAVIORAL HEALTH)
ORAL_HYGIENE: INDEPENDENT

## 2020-05-05 ASSESSMENT — MIFFLIN-ST. JEOR: SCORE: 1721

## 2020-05-05 NOTE — PROGRESS NOTES
Pt slept on and off throughout the night. No acute changes noted during q15 minute safety checks.

## 2020-05-05 NOTE — PLAN OF CARE
"  Problem: Depression  Goal: Improved Mood  Outcome: No Change  Note: Pt presents as flat in affect, calm but depressed. Her insight is limited, judgement impaired. Pt spent the day withdrawn to her room. She complained of sleep troubles last night, although less than recent nights. Pt stated that she has been hearing a voice telling her to \"fake being happy so that she can discharge and shoot herself.\" She contracts for safety, and agreed to notify staff if she feels the need to engaged in SIB. Compliant with care but requires prompting to come up for meds, etc.      "

## 2020-05-05 NOTE — PLAN OF CARE
Pt was isolative to her room. Pt presents with depressed mood, but has been brighter overall. Pt has been more talkative and has made her needs known. Stated she does want to get better and has been trying to ignore voices. Pt was feeling sad she couldn't talk to her kids today. Insight and thought process poor. Pt ate her dinner and is med compliant.

## 2020-05-06 LAB
SARS-COV-2 RNA SPEC QL NAA+PROBE: NORMAL
SPECIMEN SOURCE: NORMAL

## 2020-05-06 PROCEDURE — 25000132 ZZH RX MED GY IP 250 OP 250 PS 637: Performed by: PSYCHIATRY & NEUROLOGY

## 2020-05-06 PROCEDURE — 87635 SARS-COV-2 COVID-19 AMP PRB: CPT | Performed by: PSYCHIATRY & NEUROLOGY

## 2020-05-06 PROCEDURE — 99232 SBSQ HOSP IP/OBS MODERATE 35: CPT | Mod: 95 | Performed by: PSYCHIATRY & NEUROLOGY

## 2020-05-06 PROCEDURE — 12400000 ZZH R&B MH

## 2020-05-06 RX ORDER — TRAZODONE HYDROCHLORIDE 100 MG/1
200 TABLET ORAL
Status: DISCONTINUED | OUTPATIENT
Start: 2020-05-06 | End: 2020-05-08 | Stop reason: HOSPADM

## 2020-05-06 RX ORDER — PROTRIPTYLINE HYDROCHLORIDE 5 MG/1
10 TABLET, FILM COATED ORAL
Status: DISCONTINUED | OUTPATIENT
Start: 2020-05-06 | End: 2020-05-08 | Stop reason: HOSPADM

## 2020-05-06 RX ORDER — ESZOPICLONE 1 MG/1
2 TABLET, FILM COATED ORAL AT BEDTIME
Status: DISCONTINUED | OUTPATIENT
Start: 2020-05-06 | End: 2020-05-07

## 2020-05-06 RX ADMIN — LAMOTRIGINE 200 MG: 100 TABLET ORAL at 08:42

## 2020-05-06 RX ADMIN — ESZOPICLONE 2 MG: 1 TABLET, FILM COATED ORAL at 21:21

## 2020-05-06 RX ADMIN — ZIPRASIDONE HCL 40 MG: 40 CAPSULE ORAL at 17:45

## 2020-05-06 RX ADMIN — ROPINIROLE HYDROCHLORIDE 1 MG: 1 TABLET, FILM COATED ORAL at 21:21

## 2020-05-06 RX ADMIN — RAMELTEON 8 MG: 8 TABLET ORAL at 21:21

## 2020-05-06 RX ADMIN — PROTRIPTYLINE HYDROCHLORIDE 10 MG: 5 TABLET ORAL at 18:20

## 2020-05-06 RX ADMIN — ZIPRASIDONE HCL 40 MG: 40 CAPSULE ORAL at 08:42

## 2020-05-06 ASSESSMENT — ACTIVITIES OF DAILY LIVING (ADL)
HYGIENE/GROOMING: INDEPENDENT
DRESS: SCRUBS (BEHAVIORAL HEALTH)
HYGIENE/GROOMING: INDEPENDENT
DRESS: INDEPENDENT
ORAL_HYGIENE: INDEPENDENT
LAUNDRY: UNABLE TO COMPLETE
ORAL_HYGIENE: INDEPENDENT
LAUNDRY: WITH SUPERVISION

## 2020-05-06 NOTE — PROGRESS NOTES
"[facility]  Psychiatric Progress Note    Length of stay (days): 5        Interim History:   The patient's care was discussed with the treatment team during the daily team meeting and/or staff's chart notes were reviewed.  Staff report: no acute issues over night.  Mostly isolating to herself.  Reporting suicidal ideation.    Depression severity scale 0-10 (10=most severe):  Today:7    She had difficulty sleeping last night however explains that it was no longer related to restless legs.  She physically felt comfortable however was experiencing ruminating and anxious thoughts which prevented her from falling asleep.  She was agreeable to try a sleep aid noting limited response to Rozerem which it was previously helpful for her.    Suicidal thoughts remain present however she feels safe in the hospital.    Mood remains depressed and characterized as severe.    Energy is low, appetite is low, concentration is poor, anhedonia is reported, patient feels helpless and hopeless.    We spent some time discussing the possibility of returning to [facility] on Friday which she was agreeable to assuming that she will be sleeping better by then.  She likes the ability of visiting with her children when not in the hospital.           Medications:       lamoTRIgine  200 mg Oral Daily     protriptyline  5 mg Oral Daily with supper     ramelteon  8 mg Oral At Bedtime     rOPINIRole  1 mg Oral At Bedtime     ziprasidone  40 mg Oral BID w/meals          Allergies:   No Known Allergies       Labs:   No results found for this or any previous visit (from the past 24 hour(s)).       Psychiatric Examination:     BP 94/66   Pulse 78   Temp 98  F (36.7  C) (Oral)   Resp 14   Ht 1.651 m (5' 5\")   Wt 103 kg (227 lb 1.6 oz)   SpO2 97%   BMI 37.79 kg/m    Weight is 227 lbs 1.6 oz  Body mass index is 37.79 kg/m .  Orthostatic Vitals     None            Appearance: awake, alert  Attitude:  Mostly " passive  Eye Contact:  poor   Mood:  depressed  Affect:  intensity is blunted  Speech:  decreased prosody  Psychomotor Behavior:  no evidence of tardive dyskinesia, dystonia, or tics  Throught Process:  linear  Associations:  no loose associations  Thought Content:  active suicidal ideation present and auditory hallucinations present  Insight:  partial  Judgement:  fair  Oriented to:  time, person, and place  Attention Span and Concentration:  intact  Recent and Remote Memory:  fair    Clinical Global Impressions  First:     Most recent:            Precautions:     Behavioral Orders   Procedures     Code 1 - Restrict to Unit     [place]     Neuropsych Testing     Please contact the [facility] group for the consultation and testing.  Reason for consultation: Personality assessment and second opinion on diagnosis     Routine Programming     As clinically indicated     Self Injury Precaution     Status 15     Every 15 minutes.     Status 1:1     Constant visual observations          DIagnoses:     Major depressive disorder-recurrent, severe with psychotic features  Generalized anxiety disorder  Suspect borderline personality disorder         Plan:     Increase protriptyline targeting depressive symptoms.  Monitoring response and tolerability.  Continue the higher dose of Geodon to 40 mg twice a day to address psychotic symptoms.    Continue lamotrigine while anticipating increasing the dose to optimize effectiveness.  Serum level checked during her recent hospitalization revealed that there is room to increase the dose safely.    Continue Requip to address restless legs which seem to be contributing to insomnia.    Begin Lunesta for insomnia.  Continue Rozerem for additional augmentation.    Psychosocial treatments to be addressed with social work consult and groups.  -Psychology consultation has been requested for neuropsychological testing to further explore personality characteristics which may be complicating her  treatment course while gaining a second opinion on her diagnosis to ensure optimal treatment planning.  Anticipate meeting with a psychologist today to complete the examination.    Legal Status: voluntary    Disposition: Begin preparing for discharge back to [facility] on Friday.     Visit/Communication Style   VIRTUAL (VIDEO) communication was used to evaluate Misty due to the COVID pandemic.    Misty consented to the use of video communication: yes  Video START time: 11:55 AM  Video STOP time: 12:07 PM  Patient's location: [facility]   Provider's location during the visit: Home

## 2020-05-06 NOTE — PROGRESS NOTES
"[facility]  Psychiatric Progress Note    Length of stay (days): 4        Interim History:   The patient's care was discussed with the treatment team during the daily team meeting and/or staff's chart notes were reviewed.  Staff report: no acute issues over night.  Mostly isolating to herself.  Reporting suicidal ideation.    Depression severity scale 0-10 (10=most severe):  Today: 9    The patient was in her room sleeping.  She continues to report little motivation and energy to get out of bed or participating groups.  She attributes this to the severity of her depressive episode.    She continues to report the presence of auditory hallucinations although slightly less intense and less frequent when compared to our last meeting.  She had previously described the intensity as 7 out of 10 and today reports an intensity of 5 out of 10.  Content is derogatory and commands suicide.    She continues to endorse suicidal thoughts and is not able to contract for safety if discharged from the hospital.  She explains that the voices are telling her to acquire a gun and shoot herself.    Mood remains depressed and characterized as severe.    She continues to have some difficulty sleeping at night however attributes this to mild restless legs that have become more notable over the past 3-4 nights.    Energy is low, appetite is low, concentration is poor, anhedonia is reported, patient feels helpless and hopeless.           Medications:       lamoTRIgine  200 mg Oral Daily     protriptyline  5 mg Oral Daily with supper     ramelteon  8 mg Oral At Bedtime     ziprasidone  40 mg Oral BID w/meals          Allergies:   No Known Allergies       Labs:   No results found for this or any previous visit (from the past 24 hour(s)).       Psychiatric Examination:     /62   Pulse 63   Temp 97.8  F (36.6  C) (Oral)   Resp 16   Ht 1.651 m (5' 5\")   Wt 103 kg (227 lb 1.6 oz)   SpO2 99%   BMI 37.79 kg/m    Weight is " 227 lbs 1.6 oz  Body mass index is 37.79 kg/m .  Orthostatic Vitals     None            Appearance: awake, alert  Attitude:  Mostly passive  Eye Contact:  poor   Mood:  depressed  Affect:  intensity is blunted  Speech:  decreased prosody  Psychomotor Behavior:  no evidence of tardive dyskinesia, dystonia, or tics  Throught Process:  linear  Associations:  no loose associations  Thought Content:  active suicidal ideation present and auditory hallucinations present  Insight:  partial  Judgement:  fair  Oriented to:  time, person, and place  Attention Span and Concentration:  intact  Recent and Remote Memory:  fair    Clinical Global Impressions  First:     Most recent:            Precautions:     Behavioral Orders   Procedures     Code 1 - Restrict to Unit     [place]     Neuropsych Testing     Please contact the [facility] group for the consultation and testing.  Reason for consultation: Personality assessment and second opinion on diagnosis     Routine Programming     As clinically indicated     Self Injury Precaution     Status 15     Every 15 minutes.     Status 1:1     Constant visual observations          DIagnoses:     Major depressive disorder-recurrent, severe with psychotic features  Generalized anxiety disorder  Suspect borderline personality disorder         Plan:     Continue protriptyline targeting depressive symptoms.  Monitoring response while anticipating increasing the dose in the next 1 to 2 days.  Increase Geodon to 40 mg twice a day to address psychotic symptoms.  Continue lamotrigine while anticipating increasing the dose to optimize effectiveness.  Serum level checked during her recent hospitalization revealed that there is room to increase the dose safely.    Plan to increase protriptyline soon to better address depressive symptoms.    Monitoring response to the higher dose of Geodon targeting psychosis.    Add Requip to address restless legs which seem to be contributing to insomnia.    Continue  Requip for insomnia.    Psychosocial treatments to be addressed with social work consult and groups.  -Psychology consultation has been requested for neuropsychological testing to further explore personality characteristics which may be complicating her treatment course while gaining a second opinion on her diagnosis to ensure optimal treatment planning.    Legal Status: voluntary    Disposition: Anticipate pursuing intensive residential treatment services after she has demonstrated adequate improvement in mood and reduction of suicidal thoughts.     Visit/Communication Style   VIRTUAL (VIDEO) communication was used to evaluate Misty due to the COVID pandemic.    Misty consented to the use of video communication: yes  Video START time: 1:44 PM  Video STOP time: 2:01 PM  Patient's location: [facility]   Provider's location during the visit: Home

## 2020-05-06 NOTE — PROGRESS NOTES
Patient continues to have her bed at [facility] ready for her discharge on Friday 5/8/2020. Patient is agreeable with this plan and is hopeful that her concerns regarding lack of sleep will resolve in the coming days.

## 2020-05-06 NOTE — PLAN OF CARE
"  Problem: Depression  Goal: Improved Mood  5/5/2020 2224 by Maykel Peck, RN  Note: Pt was present in the milieu and visible all shift with flat affect, calm mood. Insight is poor and impaired judgment. Pt continue to admit hearing voices but denies having any plan. Pt is still withdrawn although she spent some time in the loung watching TV. Did not attend any of the groups. Pt was med compliant and ate her food and requested for snacks.    5/5/2020 1306 by Domenica Grimes  Outcome: No Change  Note: Pt presents as flat in affect, calm but depressed. Her insight is limited, judgement impaired. Pt spent the day withdrawn to her room. She complained of sleep troubles last night, although less than recent nights. Pt stated that she has been hearing a voice telling her to \"fake being happy so that she can discharge and shoot herself.\" She contracts for safety, and agreed to notify staff if she feels the need to engaged in SIB. Compliant with care but requires prompting to come up for meds, etc.      "

## 2020-05-06 NOTE — PLAN OF CARE
Pt presents with sad mood and hopeless, depressed affect. Isolative and withdrawn to room. Thought process poor, judgment impaired. Appropriate with staff, minimal responses. States she has no desire to live with no plan currently. Told staff she is not hearing voices but has chronic thoughts of SI. Agreed to going back to [facility] by next Friday if feeling well. Contracts for safety in hospital. Will continue to monitor.

## 2020-05-07 LAB
SARS-COV-2 PCR COMMENT: NORMAL
SARS-COV-2 RNA SPEC QL NAA+PROBE: NEGATIVE
SPECIMEN SOURCE: NORMAL

## 2020-05-07 PROCEDURE — 25000132 ZZH RX MED GY IP 250 OP 250 PS 637: Performed by: PSYCHIATRY & NEUROLOGY

## 2020-05-07 PROCEDURE — 99232 SBSQ HOSP IP/OBS MODERATE 35: CPT | Mod: 95 | Performed by: PSYCHIATRY & NEUROLOGY

## 2020-05-07 PROCEDURE — 12400000 ZZH R&B MH

## 2020-05-07 RX ORDER — ZIPRASIDONE HYDROCHLORIDE 40 MG/1
40 CAPSULE ORAL 2 TIMES DAILY WITH MEALS
Qty: 60 CAPSULE | Refills: 0 | Status: SHIPPED | OUTPATIENT
Start: 2020-05-07 | End: 2020-06-14

## 2020-05-07 RX ORDER — ROPINIROLE 1 MG/1
1 TABLET, FILM COATED ORAL AT BEDTIME
Qty: 30 TABLET | Refills: 0 | Status: ON HOLD | OUTPATIENT
Start: 2020-05-07 | End: 2020-06-22

## 2020-05-07 RX ORDER — ESZOPICLONE 3 MG/1
3 TABLET, FILM COATED ORAL AT BEDTIME
Status: DISCONTINUED | OUTPATIENT
Start: 2020-05-07 | End: 2020-05-08 | Stop reason: HOSPADM

## 2020-05-07 RX ORDER — ESZOPICLONE 3 MG/1
3 TABLET, FILM COATED ORAL AT BEDTIME
Qty: 30 TABLET | Refills: 0 | Status: ON HOLD | OUTPATIENT
Start: 2020-05-07 | End: 2020-06-22

## 2020-05-07 RX ORDER — PROTRIPTYLINE HYDROCHLORIDE 10 MG/1
10 TABLET, FILM COATED ORAL
Qty: 30 TABLET | Refills: 0 | Status: ON HOLD | OUTPATIENT
Start: 2020-05-07 | End: 2020-06-22

## 2020-05-07 RX ORDER — ESZOPICLONE 3 MG/1
3 TABLET, FILM COATED ORAL AT BEDTIME
Status: DISCONTINUED | OUTPATIENT
Start: 2020-05-07 | End: 2020-05-07

## 2020-05-07 RX ADMIN — ZIPRASIDONE HCL 40 MG: 40 CAPSULE ORAL at 17:56

## 2020-05-07 RX ADMIN — RAMELTEON 8 MG: 8 TABLET ORAL at 20:54

## 2020-05-07 RX ADMIN — PROTRIPTYLINE HYDROCHLORIDE 10 MG: 5 TABLET ORAL at 20:56

## 2020-05-07 RX ADMIN — ESZOPICLONE 3 MG: 3 TABLET, FILM COATED OROPHARYNGEAL at 20:54

## 2020-05-07 RX ADMIN — ZIPRASIDONE HCL 40 MG: 40 CAPSULE ORAL at 09:05

## 2020-05-07 RX ADMIN — LAMOTRIGINE 200 MG: 100 TABLET ORAL at 09:05

## 2020-05-07 RX ADMIN — ROPINIROLE HYDROCHLORIDE 1 MG: 1 TABLET, FILM COATED ORAL at 20:54

## 2020-05-07 ASSESSMENT — ACTIVITIES OF DAILY LIVING (ADL)
HYGIENE/GROOMING: INDEPENDENT
DRESS: INDEPENDENT
ORAL_HYGIENE: INDEPENDENT
DRESS: INDEPENDENT
HYGIENE/GROOMING: INDEPENDENT
LAUNDRY: WITH SUPERVISION
ORAL_HYGIENE: INDEPENDENT

## 2020-05-07 ASSESSMENT — MIFFLIN-ST. JEOR: SCORE: 1729.16

## 2020-05-07 NOTE — PLAN OF CARE
Problem: Adult Behavioral Health Plan of Care  Goal: Patient-Specific Goal (Individualization)  Outcome: No Change  Note: Pt has been spending time in her room through most of the shift. Pt has been able to attend groups and participate minimally. Pt remains flat and depressed and continues to have suicidal ideation . Pt also stated she is hearing voices that tell her to fake being better so she can kill herself. Pt is able to contract for safety. Pt does admit to feeling better and pt does appear brighter. Pt continues to have poor insight but has been able to talk to staff about ways she is going to work on herself and is slightly more hopeful. Plan to discharge to [facility] tomorrow. Nursing to continue to monitor.
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
## 2020-05-07 NOTE — PROGRESS NOTES
[facility]  Psychiatric Progress Note    Length of stay (days): 6        Interim History:   The patient's care was discussed with the treatment team during the daily team meeting and/or staff's chart notes were reviewed.  Staff report: no acute issues over night.  Mostly isolating to herself.  Reporting suicidal ideation.    Depression severity scale 0-10 (10=most severe):  Today:7    She was in group this morning.  She mentions that she is not certain if she deserves to feel happy.  She was able to reflect on many past negative experiences in the likelihood that her depressive symptoms contribute to this thought process.    She reports that she slept better last night.  She does admit that it took longer than anticipated to fall asleep however slept throughout most of the night.    She denied psychotic symptoms today.  She was able to recall that overall the intensity of hallucinations have been improving and is happy that the symptoms are not present today.    Suicidal thoughts are passive today.    Energy is low, appetite is low, concentration is poor, anhedonia is reported.           Medications:       eszopiclone  2 mg Oral At Bedtime     lamoTRIgine  200 mg Oral Daily     protriptyline  10 mg Oral Daily with supper     ramelteon  8 mg Oral At Bedtime     rOPINIRole  1 mg Oral At Bedtime     ziprasidone  40 mg Oral BID w/meals          Allergies:   No Known Allergies       Labs:     Recent Results (from the past 24 hour(s))   COVID-19 Virus (Coronavirus) by PCR Nasopharyngeal    Collection Time: 05/06/20  8:20 PM    Specimen: Nasopharyngeal   Result Value Ref Range    COVID-19 Virus PCR to [facility] - Source Nasopharyngeal     COVID-19 Virus PCR to [facility] - Result       Test received-See reflex to IDDL test SARS CoV2 (COVID-19) Virus RT-PCR   SARS-CoV-2 COVID-19 Virus (Coronavirus) RT-PCR Nasopharyngeal    Collection Time: 05/06/20  8:20 PM    Specimen: Nasopharyngeal   Result Value Ref Range     Vipul from the office of Dr Edwards states he is verifying if the office of Dr Echeverria has received papers that he faxed over to doctors staff today   "SARS-CoV-2 Virus Specimen Source Nasopharyngeal     SARS-CoV-2 PCR Result NEGATIVE     SARS-CoV-2 PCR Comment       The Simplexa COVID-19 direct PCR assay by [employer] on the [employer] instrument has been   given Emergency Use Authorization (EUA) for the in vitro qualitative detection of RNA from   the SARS-CoV2 virus in nasopharyngeal swabs in viral transport medium from patients with   signs and symptoms of infection who are suspected of COVID-19. Performance is unknown in   asymptomatic patients.            Psychiatric Examination:     /71   Pulse 64   Temp 97.7  F (36.5  C) (Oral)   Resp 15   Ht 1.651 m (5' 5\")   Wt 103.8 kg (228 lb 14.4 oz)   SpO2 100%   BMI 38.09 kg/m    Weight is 228 lbs 14.4 oz  Body mass index is 38.09 kg/m .  Orthostatic Vitals     None            Appearance: awake, alert  Attitude:  Mostly passive  Eye Contact:  poor   Mood:  depressed  Affect:  intensity is blunted  Speech:  decreased prosody  Psychomotor Behavior:  no evidence of tardive dyskinesia, dystonia, or tics  Throught Process:  linear  Associations:  no loose associations  Thought Content:  no evidence of suicidal ideation or homicidal ideation and passive suicidal ideation present  Insight:  partial  Judgement:  fair  Oriented to:  time, person, and place  Attention Span and Concentration:  intact  Recent and Remote Memory:  fair    Clinical Global Impressions  First:     Most recent:            Precautions:     Behavioral Orders   Procedures     Code 1 - Restrict to Unit     [place]     Neuropsych Testing     Please contact the [facility] group for the consultation and testing.  Reason for consultation: Personality assessment and second opinion on diagnosis     Routine Programming     As clinically indicated     Self Injury Precaution     Status 15     Every 15 minutes.     Status 1:1     Constant visual observations          DIagnoses:     Major depressive disorder-recurrent, severe with psychotic " features  Generalized anxiety disorder  Suspect borderline personality disorder         Plan:     Continue the higher dose of protriptyline.  Monitoring response and tolerability.  Continue the higher dose of Geodon to 40 mg twice a day to address psychotic symptoms.    Continue lamotrigine while anticipating increasing the dose to optimize effectiveness.  Serum level checked during her recent hospitalization revealed that there is room to increase the dose safely.    Continue Requip to address restless legs which seem to be contributing to insomnia.    Increase Lunesta for insomnia.  Continue Rozerem for additional augmentation.    Psychosocial treatments to be addressed with social work consult and groups.  -Psychology consultation has been requested for neuropsychological testing to further explore personality characteristics which may be complicating her treatment course while gaining a second opinion on her diagnosis to ensure optimal treatment planning.  Anticipate meeting with a psychologist today to complete the examination.    Legal Status: voluntary    Disposition: Begin preparing for discharge back to [facility] treatment [facility] on Friday.     Visit/Communication Style   VIRTUAL (VIDEO) communication was used to evaluate Misty due to the COVID pandemic.    Misty consented to the use of video communication: yes  Video START time: 11:52 AM  Video STOP time: 12:08 PM  Patient's location: [facility]   Provider's location during the visit: Home

## 2020-05-07 NOTE — PROGRESS NOTES
"   05/07/20 1200   General Information   Date Initially Attended OT 05/07/20     Pt attended OT group with MOD encouragement, however, declined to participate in discussion stating, \"I'll go, but I won't talk.\" More observation needed to complete initial evaluation at this time.      "

## 2020-05-07 NOTE — CONSULTS
Met with patient via Skype for a psychological evaluation. She was cooperative, but very flat. She does endorse SUICIDAL IDEATION WITH PLAN TO STRANGLE SELF. She states that when she begins to feel better from medications she typically stops taking them due to feeling as though she does not deserve to feel better. She also endorses auditory hallucinations, but these appear to be mood congruent as they typically tell her to harm herself or say negative things about her.     Patient's cognitive abilities is estimated to be in the average range. She does report having an IEP in school for math and reading. The MMPI-2 shows high depression symptoms with possible history of trauma. Her suicidal ideation score was extremely elevated. The MCMI-III suggests persistent depression with major depressive episodes and bouts of anxiety . Also noted were avoidant traits and features. No borderline personality noted. Full report to follow.       Radha Garcia.[email or web address], LP  Licensed Psychologist  [facility]  [phone]

## 2020-05-07 NOTE — PLAN OF CARE
"Pt spent majority of the shift in her room. Pt was so open to talk and share her feelings. Pt stated she is still hearing voices but they are not constant anymore and they pop in and disappeared and tell her to shoot herself but she would overdose when she discharges. Pt mentioned she has written a DEATH NOTE for her kids last night. When pt was asked if she would have access to guns when she discharge, she said: \"my da has some shotguns but they are too long and it is hard to pull the trigger to shoot myself\". Pt stated she stopped taking her meds when she was at group home because she had started feeling better and she thinks SHE DOESN'T NOT DESERVE IT. Med compliant.  "

## 2020-05-08 VITALS
TEMPERATURE: 98.3 F | WEIGHT: 228.9 LBS | SYSTOLIC BLOOD PRESSURE: 115 MMHG | OXYGEN SATURATION: 100 % | HEIGHT: 65 IN | BODY MASS INDEX: 38.14 KG/M2 | DIASTOLIC BLOOD PRESSURE: 81 MMHG | HEART RATE: 72 BPM | RESPIRATION RATE: 15 BRPM

## 2020-05-08 PROCEDURE — 25000132 ZZH RX MED GY IP 250 OP 250 PS 637: Performed by: PSYCHIATRY & NEUROLOGY

## 2020-05-08 PROCEDURE — 99239 HOSP IP/OBS DSCHRG MGMT >30: CPT | Mod: 95 | Performed by: PSYCHIATRY & NEUROLOGY

## 2020-05-08 RX ADMIN — ZIPRASIDONE HCL 40 MG: 40 CAPSULE ORAL at 08:28

## 2020-05-08 RX ADMIN — LAMOTRIGINE 200 MG: 100 TABLET ORAL at 08:28

## 2020-05-08 NOTE — PROGRESS NOTES
"During last round, staff found pt rocking in bed with her hands on her head. She talked about worries around discharging. She also stated, \"I'm not coming back here.\" When asked why, she stated, \"my kids are not talking to me. They are frustrated and tired. I have been in and out of the hospital and I won't anymore. I won't ask for help. I'll just die.\"  "

## 2020-05-08 NOTE — DISCHARGE SUMMARY
[facility]  Department of Psychiatry    DATE OF ADMISSION:  5/1/2020    DATE OF DISCHARGE:  May 8, 2020    DISCHARGE DIAGNOSES:   Major depressive disorder-recurrent, severe with psychotic features  Generalized anxiety disorder  Suspect borderline personality disorder    HOSPITAL COURSE: (Refer to H&P, progress notes, and consult notes for details)    The patient was admitted to our Behavioral Health Unit under voluntary status for depressed mood and suicidal ideation after being referred back to the hospital by her residential treatment program through [facility].  On examination, the patient reported ongoing depressive symptoms that were contributing to suicidal ideation and auditory hallucinations but also made it difficult for her to engage in the residential treatment program.  She had also discontinued her psychotropic medications as she reported experiencing a tremor and was not certain which medication was contributing to that symptom.  She was agreeable to continue hospitalization voluntarily while targeting the severity of her depressive symptoms and suicidal ideation.    Lithium was not restarted noting that she was likely experiencing a tremor as a side effect which led to noncompliance with her medications.  Noting that she continue to experience auditory hallucinations on Rexulti, that medication was also not resumed due to limited benefit.  Noting ongoing depressive symptoms without ability to gain remission of symptoms while on Pristiq, that medication was also discontinued.    Protriptyline was added targeting symptoms of her mood and anxiety disorder.  The dose was increased with good tolerability.  Geodon was added targeting psychosis and the dose was also increased with good tolerability.  Lunesta was added for insomnia.  The patient reported some restlessness around bedtime for which Requip was started and helped alleviate that symptom.  Lamotrigine was continued for  management of affective instability.  The intensity of her mood symptoms decreased and she no longer reported active suicidal ideation.  She reported that the intensity of psychosis had diminished significantly, now reported as intermittent as opposed to constant and quieter in volume when present.  At the time of my last meeting with the patient, she was not experiencing any active psychotic symptoms or experiencing any medication side effects.  She was sleeping approximately 6 to 7 hours a night with the Lunesta and seemed to have gained benefit since the medication was added.      She did not engage in any self-injurious behavior during her hospital stay.  Towards the last couple of days of her admission, she regained ability to attend groups and seemed to gain some benefit in doing so.  She was agreeable to return back to her residential treatment program.  She met with our  to assist in coordinating her return back to [facility] for residential treatment while ensuring optimization of her outpatient care plan.      Other interventions received during his hospitalization included:   Psychosocial treatments were addressed with groups, social work consult, and supportive milieu provided by staff.  Psychology consultation was also requested during this hospital stay from which neuropsychological testing was completed.  Refer to Dr. Moreno's notes for full details, summarizing a diagnosis of major depressive disorder and generalized anxiety disorder along with personality characteristics that did not meet full criteria for a specific personality disorder.  Personality traits involved those related to dependent  and borderline.    CONDITION AT DISCHARGE:  Improved.  The patients acute suicide risk is low due to the following factors:  improved mood/anxiety symptoms.  Denies active suicidal plan or intent. Denies psychotic symptoms.  Not actively intoxicated and plans to abstain from illicit substances  and alcohol.  Denies access to guns.  Denies feeling hopeless or helpless. At the time of discharge Misty Parham was determined to not be an immediate danger to herself or others. The patient's acute risk will be higher if noncompliant with treatment plan, medications, follow-up or using illicit substances or alcohol.  These findings along with the risks of noncompliance with medications and treatment plan, which could potentially cause decompensation and increase the risk for suicide, were discussed with the patient.  The patients chronic suicide risk is moderate given the following factors:white race; diagnosis of MDD,  history of SIB; unemployed; Denied a family history of suicide.  Preventative factors include: social supports, children     MENTAL STATUS EXAMINATION AT TIME OF DISCHARGE:  The patient is 34 year old White female who appears their stated age and is appropriately dressed with good hygiene.  Calm and cooperative with the interview questions.  No psychomotor abnormalities are noted. Eye contact is appropriate. Speech has normal rate, tone, latency and volume and is not pushed or pressured. Mood is moderately depressed and affect is blunted however slightly brighter than her initial presentation.  The patient does not seem overtly manic or irritable.  Thought process is linear, logical and future oriented.  Thought process is not tangential, circumstantial or disorganized.  Thought content is not significant for apperant paranoia, delusions, ideas of reference or grandiosity.  The patient denies active suicidal and homicidal ideations as well as auditory and visual hallucinations.  Insight and judgment are fair.  Cognition appears intact to interviewing including orientation, recent and remote memory, fund of knowledge, use of language, attention span and concentration.  Muscle strength, tone and gait appear normal on visual inspection.      DISPOSITION:  The patient is discharged to [facility] for  intensive residential treatment services    FOLLOWUP APPOINTMENTS:  ( per social workers notes and after visit summary)  Next appointment:  Friday May 15th at 10:30am, with Anderson Moss.     This is an intake appointment.    Following this appointment, Anderson will schedule an appointment for you with a psychiatrist.   Please be on time.  [facility]  [address]     [place]  Ph:  [phone]    Fx:  [phone]        --------------------------------------------------------     Your [facility]  is Samreen Machuca at [phone].    Fx:  [phone]      DISCHARGE MEDICATIONS:   Current Discharge Medication List      START taking these medications    Details   eszopiclone (LUNESTA) 3 MG tablet Take 1 tablet (3 mg) by mouth At Bedtime  Qty: 30 tablet, Refills: 0    Associated Diagnoses: Primary insomnia      protriptyline (VIVACTIL) 10 MG tablet Take 1 tablet (10 mg) by mouth daily (with dinner)  Qty: 30 tablet, Refills: 0    Associated Diagnoses: Severe recurrent major depressive disorder with psychotic features (H)      rOPINIRole (REQUIP) 1 MG tablet Take 1 tablet (1 mg) by mouth At Bedtime  Qty: 30 tablet, Refills: 0    Associated Diagnoses: Restless leg syndrome      ziprasidone (GEODON) 40 MG capsule Take 1 capsule (40 mg) by mouth 2 times daily (with meals)  Qty: 60 capsule, Refills: 0    Associated Diagnoses: Severe recurrent major depressive disorder with psychotic features (H); Borderline personality disorder (H)         CONTINUE these medications which have NOT CHANGED    Details   hydrOXYzine (ATARAX) 25 MG tablet Take 1-2 tablets (25-50 mg) by mouth 3 times daily as needed for anxiety  Qty: 30 tablet, Refills: 0    Associated Diagnoses: Severe episode of recurrent major depressive disorder, without psychotic features (H)      lamoTRIgine (LAMICTAL) 200 MG tablet Take 1 tablet (200 mg) by mouth daily  Qty: 30 tablet, Refills: 0    Associated Diagnoses:  Borderline personality disorder (H)      ramelteon (ROZEREM) 8 MG tablet Take 1 tablet (8 mg) by mouth At Bedtime  Qty: 30 tablet, Refills: 0    Associated Diagnoses: Primary insomnia         STOP taking these medications       brexpiprazole (REXULTI) 3 MG tablet Comments:   Reason for Stopping:         desvenlafaxine succinate (PRISTIQ) 100 MG 24 hr tablet Comments:   Reason for Stopping:         lithium (ESKALITH CR/LITHOBID) 450 MG CR tablet Comments:   Reason for Stopping:         mirtazapine (REMERON) 45 MG tablet Comments:   Reason for Stopping:                LABORATORY RESULTS: (past 14 days)  Recent Results (from the past 336 hour(s))   Basic metabolic panel    Collection Time: 05/01/20 12:05 PM   Result Value Ref Range    Sodium 139 133 - 144 mmol/L    Potassium 4.3 3.4 - 5.3 mmol/L    Chloride 108 94 - 109 mmol/L    Carbon Dioxide 22 20 - 32 mmol/L    Anion Gap 9 3 - 14 mmol/L    Glucose 82 70 - 99 mg/dL    Urea Nitrogen 8 7 - 30 mg/dL    Creatinine 0.81 0.52 - 1.04 mg/dL    GFR Estimate >90 >60 mL/min/[1.73_m2]    GFR Estimate If Black >90 >60 mL/min/[1.73_m2]    Calcium 9.3 8.5 - 10.1 mg/dL   CBC with platelets differential    Collection Time: 05/01/20 12:05 PM   Result Value Ref Range    WBC 12.0 (H) 4.0 - 11.0 10e9/L    RBC Count 4.45 3.8 - 5.2 10e12/L    Hemoglobin 13.6 11.7 - 15.7 g/dL    Hematocrit 41.8 35.0 - 47.0 %    MCV 94 78 - 100 fl    MCH 30.6 26.5 - 33.0 pg    MCHC 32.5 31.5 - 36.5 g/dL    RDW 14.0 10.0 - 15.0 %    Platelet Count 367 150 - 450 10e9/L    Diff Method Automated Method     % Neutrophils 78.6 %    % Lymphocytes 14.5 %    % Monocytes 5.3 %    % Eosinophils 1.2 %    % Basophils 0.2 %    % Immature Granulocytes 0.2 %    Nucleated RBCs 0 0 /100    Absolute Neutrophil 9.5 (H) 1.6 - 8.3 10e9/L    Absolute Lymphocytes 1.8 0.8 - 5.3 10e9/L    Absolute Monocytes 0.6 0.0 - 1.3 10e9/L    Absolute Eosinophils 0.2 0.0 - 0.7 10e9/L    Absolute Basophils 0.0 0.0 - 0.2 10e9/L    Abs Immature  Granulocytes 0.0 0 - 0.4 10e9/L    Absolute Nucleated RBC 0.0    TSH with free T4 reflex and/or T3 as indicated    Collection Time: 05/01/20 12:05 PM   Result Value Ref Range    TSH 3.35 0.40 - 4.00 mU/L   Magnesium    Collection Time: 05/01/20 12:05 PM   Result Value Ref Range    Magnesium 2.3 1.6 - 2.3 mg/dL   Phosphorus    Collection Time: 05/01/20 12:05 PM   Result Value Ref Range    Phosphorus 3.2 2.5 - 4.5 mg/dL   Lipid panel    Collection Time: 05/02/20  7:40 AM   Result Value Ref Range    Cholesterol 123 <200 mg/dL    Triglycerides 85 <150 mg/dL    HDL Cholesterol 40 (L) >49 mg/dL    LDL Cholesterol Calculated 66 <100 mg/dL    Non HDL Cholesterol 83 <130 mg/dL   COVID-19 Virus (Coronavirus) by PCR Nasopharyngeal    Collection Time: 05/06/20  8:20 PM    Specimen: Nasopharyngeal   Result Value Ref Range    COVID-19 Virus PCR to [facility] - Source Nasopharyngeal     COVID-19 Virus PCR to [facility] - Result       Test received-See reflex to IDDL test SARS CoV2 (COVID-19) Virus RT-PCR   SARS-CoV-2 COVID-19 Virus (Coronavirus) RT-PCR Nasopharyngeal    Collection Time: 05/06/20  8:20 PM    Specimen: Nasopharyngeal   Result Value Ref Range    SARS-CoV-2 Virus Specimen Source Nasopharyngeal     SARS-CoV-2 PCR Result NEGATIVE     SARS-CoV-2 PCR Comment       The Simplexa COVID-19 direct PCR assay by [employer] on the [employer] instrument has been   given Emergency Use Authorization (EUA) for the in vitro qualitative detection of RNA from   the SARS-CoV2 virus in nasopharyngeal swabs in viral transport medium from patients with   signs and symptoms of infection who are suspected of COVID-19. Performance is unknown in   asymptomatic patients.         >30 minutes was spent on this discharge to allow for reviewing the patient's response to treatment, reviewing plan of care, education on medications and diagnosis, and conducting a risk  assessment.    ----------------------------------------------------------  Visit/Communication Style      VIRTUAL (VIDEO) communication was used to evaluate Misty due to the COVID pandemic.    Misty consented to the use of video communication: yes  Video START time: 11:30 AM  Video STOP time: 12:55 PM  Patient's location: [facility]   Provider's location during the visit: Home

## 2020-05-08 NOTE — PROGRESS NOTES
Patient was monitored via in-patient 15 minute checks and appeared to be asleep throughout the begining of the shift; patient was up at 0330 with c/o insomnia and remained awake for rest of shift. No safety concerns noted. Will continue to monitor.

## 2020-05-08 NOTE — PLAN OF CARE
Pt is feeling anxious and tired today. Pt is a bit worried about her discharge but feels ready. Pt has some SI but describes those thoughts as improved and more fleeting. Pt denies hearing voices. Pt talked about how she struggles with feeling like she should not be feeling good and this is why she stops her meds and then she ends back in the hospital. Staff encouraged her that those insights are positive and that her improved mood can help with her process some of those emotions in therapy. Pt is brighter in affect and is more insightful.   Discharge instructions reviewed with patient including follow-up care plan. Educated on medication regime including review of name, dose, route, frequency, side effects, and next dose needed.  Advised not to stop prescribed medication without consulting their physician.  Receptive to instructions and teachings.  Copy of AVS given to patient. Coping skills and support network reviewed.  All belongings which where brought into the hospital have been returned to patient. Escorted off the unit/ Picked up by taxi cab. Pt D/C'd [id number] .

## 2020-05-08 NOTE — PLAN OF CARE
"Shift Update: Pt presents with a flat affect and an anxious/ depressed mood. Pt appeared slightly brighter than previous shifts. Pt was withdrawn, but did make an effort to attend groups and spent time in the lounge. Pt stated that she still has thoughts of SI, but that the voices( hallucinations) are now just whisperers.  When asked about her treatment, pt stated that the medications are feeling like they are working, but when they start to work, she stops taking them because she \" doesn't deserve to feel better\". Pt contracted for safety.  Insight is is poor, as is judgement.  "

## 2020-05-26 ENCOUNTER — MEDICAL CORRESPONDENCE (OUTPATIENT)
Dept: HEALTH INFORMATION MANAGEMENT | Facility: CLINIC | Age: 35
End: 2020-05-26

## 2020-06-13 ENCOUNTER — HOSPITAL ENCOUNTER (EMERGENCY)
Facility: CLINIC | Age: 35
Discharge: OTHER INSTITUTION | End: 2020-06-14
Attending: EMERGENCY MEDICINE | Admitting: EMERGENCY MEDICINE
Payer: COMMERCIAL

## 2020-06-13 DIAGNOSIS — R45.851 SUICIDAL IDEATION: ICD-10-CM

## 2020-06-13 PROCEDURE — 90791 PSYCH DIAGNOSTIC EVALUATION: CPT

## 2020-06-13 PROCEDURE — 99285 EMERGENCY DEPT VISIT HI MDM: CPT | Mod: 25

## 2020-06-13 ASSESSMENT — MIFFLIN-ST. JEOR: SCORE: 1725.08

## 2020-06-14 ENCOUNTER — HOSPITAL ENCOUNTER (INPATIENT)
Facility: CLINIC | Age: 35
LOS: 9 days | Discharge: PSYCHIATRIC OR SUBSTANCE USE FACILITY | End: 2020-06-23
Attending: PSYCHIATRY & NEUROLOGY | Admitting: PSYCHIATRY & NEUROLOGY
Payer: MEDICAID

## 2020-06-14 ENCOUNTER — TELEPHONE (OUTPATIENT)
Dept: BEHAVIORAL HEALTH | Facility: CLINIC | Age: 35
End: 2020-06-14

## 2020-06-14 VITALS
SYSTOLIC BLOOD PRESSURE: 107 MMHG | HEIGHT: 65 IN | HEART RATE: 86 BPM | WEIGHT: 228 LBS | TEMPERATURE: 98.1 F | RESPIRATION RATE: 18 BRPM | BODY MASS INDEX: 37.99 KG/M2 | DIASTOLIC BLOOD PRESSURE: 77 MMHG | OXYGEN SATURATION: 100 %

## 2020-06-14 DIAGNOSIS — F60.3 BORDERLINE PERSONALITY DISORDER (H): ICD-10-CM

## 2020-06-14 DIAGNOSIS — F32.3 MAJOR DEPRESSIVE DISORDER, SINGLE EPISODE, SEVERE WITH PSYCHOTIC FEATURES (H): Primary | ICD-10-CM

## 2020-06-14 DIAGNOSIS — G25.81 RESTLESS LEG SYNDROME: ICD-10-CM

## 2020-06-14 DIAGNOSIS — F33.3 SEVERE RECURRENT MAJOR DEPRESSIVE DISORDER WITH PSYCHOTIC FEATURES (H): ICD-10-CM

## 2020-06-14 DIAGNOSIS — F51.01 PRIMARY INSOMNIA: ICD-10-CM

## 2020-06-14 LAB
AMPHETAMINES UR QL SCN: NEGATIVE
ANION GAP SERPL CALCULATED.3IONS-SCNC: 5 MMOL/L (ref 3–14)
BARBITURATES UR QL: NEGATIVE
BASOPHILS # BLD AUTO: 0 10E9/L (ref 0–0.2)
BASOPHILS NFR BLD AUTO: 0.4 %
BENZODIAZ UR QL: NEGATIVE
BUN SERPL-MCNC: 8 MG/DL (ref 7–30)
CALCIUM SERPL-MCNC: 9.3 MG/DL (ref 8.5–10.1)
CANNABINOIDS UR QL SCN: NEGATIVE
CHLORIDE SERPL-SCNC: 105 MMOL/L (ref 94–109)
CO2 SERPL-SCNC: 27 MMOL/L (ref 20–32)
COCAINE UR QL: NEGATIVE
CREAT SERPL-MCNC: 1.04 MG/DL (ref 0.52–1.04)
DIFFERENTIAL METHOD BLD: NORMAL
EOSINOPHIL # BLD AUTO: 0.1 10E9/L (ref 0–0.7)
EOSINOPHIL NFR BLD AUTO: 1.4 %
ERYTHROCYTE [DISTWIDTH] IN BLOOD BY AUTOMATED COUNT: 13.1 % (ref 10–15)
GFR SERPL CREATININE-BSD FRML MDRD: 69 ML/MIN/{1.73_M2}
GLUCOSE SERPL-MCNC: 103 MG/DL (ref 70–99)
HCT VFR BLD AUTO: 44.7 % (ref 35–47)
HGB BLD-MCNC: 14.7 G/DL (ref 11.7–15.7)
IMM GRANULOCYTES # BLD: 0 10E9/L (ref 0–0.4)
IMM GRANULOCYTES NFR BLD: 0.3 %
LYMPHOCYTES # BLD AUTO: 1.4 10E9/L (ref 0.8–5.3)
LYMPHOCYTES NFR BLD AUTO: 18.4 %
MCH RBC QN AUTO: 29.9 PG (ref 26.5–33)
MCHC RBC AUTO-ENTMCNC: 32.9 G/DL (ref 31.5–36.5)
MCV RBC AUTO: 91 FL (ref 78–100)
MONOCYTES # BLD AUTO: 0.5 10E9/L (ref 0–1.3)
MONOCYTES NFR BLD AUTO: 6.6 %
NEUTROPHILS # BLD AUTO: 5.4 10E9/L (ref 1.6–8.3)
NEUTROPHILS NFR BLD AUTO: 72.9 %
NRBC # BLD AUTO: 0 10*3/UL
NRBC BLD AUTO-RTO: 0 /100
OPIATES UR QL SCN: NEGATIVE
PCP UR QL SCN: NEGATIVE
PLATELET # BLD AUTO: 311 10E9/L (ref 150–450)
POTASSIUM SERPL-SCNC: 3.7 MMOL/L (ref 3.4–5.3)
RBC # BLD AUTO: 4.91 10E12/L (ref 3.8–5.2)
SARS-COV-2 PCR COMMENT: NORMAL
SARS-COV-2 RNA SPEC QL NAA+PROBE: NEGATIVE
SARS-COV-2 RNA SPEC QL NAA+PROBE: NORMAL
SODIUM SERPL-SCNC: 137 MMOL/L (ref 133–144)
SPECIMEN SOURCE: NORMAL
SPECIMEN SOURCE: NORMAL
WBC # BLD AUTO: 7.4 10E9/L (ref 4–11)

## 2020-06-14 PROCEDURE — 85025 COMPLETE CBC W/AUTO DIFF WBC: CPT | Performed by: EMERGENCY MEDICINE

## 2020-06-14 PROCEDURE — 99222 1ST HOSP IP/OBS MODERATE 55: CPT | Mod: 95 | Performed by: PSYCHIATRY & NEUROLOGY

## 2020-06-14 PROCEDURE — 80048 BASIC METABOLIC PNL TOTAL CA: CPT | Performed by: EMERGENCY MEDICINE

## 2020-06-14 PROCEDURE — 80175 DRUG SCREEN QUAN LAMOTRIGINE: CPT | Performed by: EMERGENCY MEDICINE

## 2020-06-14 PROCEDURE — 12400001 ZZH R&B MH UMMC

## 2020-06-14 PROCEDURE — 25000132 ZZH RX MED GY IP 250 OP 250 PS 637: Performed by: NURSE PRACTITIONER

## 2020-06-14 PROCEDURE — 80307 DRUG TEST PRSMV CHEM ANLYZR: CPT | Performed by: EMERGENCY MEDICINE

## 2020-06-14 RX ORDER — OLANZAPINE 10 MG/1
10 TABLET ORAL
Status: DISCONTINUED | OUTPATIENT
Start: 2020-06-14 | End: 2020-06-23 | Stop reason: HOSPADM

## 2020-06-14 RX ORDER — QUETIAPINE FUMARATE 50 MG/1
50 TABLET, FILM COATED ORAL AT BEDTIME
Status: DISCONTINUED | OUTPATIENT
Start: 2020-06-14 | End: 2020-06-15

## 2020-06-14 RX ORDER — ESZOPICLONE 3 MG/1
3 TABLET, FILM COATED ORAL AT BEDTIME
Status: DISCONTINUED | OUTPATIENT
Start: 2020-06-14 | End: 2020-06-23 | Stop reason: HOSPADM

## 2020-06-14 RX ORDER — RAMELTEON 8 MG/1
8 TABLET ORAL AT BEDTIME
Status: DISCONTINUED | OUTPATIENT
Start: 2020-06-14 | End: 2020-06-23 | Stop reason: HOSPADM

## 2020-06-14 RX ORDER — LOPERAMIDE HCL 2 MG
2 CAPSULE ORAL 4 TIMES DAILY PRN
Status: ON HOLD | COMMUNITY
End: 2020-06-22

## 2020-06-14 RX ORDER — ACETAMINOPHEN 325 MG/1
650 TABLET ORAL EVERY 4 HOURS PRN
Status: DISCONTINUED | OUTPATIENT
Start: 2020-06-14 | End: 2020-06-23 | Stop reason: HOSPADM

## 2020-06-14 RX ORDER — BENZOCAINE/MENTHOL 6 MG-10 MG
LOZENGE MUCOUS MEMBRANE 2 TIMES DAILY PRN
Status: DISCONTINUED | OUTPATIENT
Start: 2020-06-14 | End: 2020-06-23 | Stop reason: HOSPADM

## 2020-06-14 RX ORDER — DIPHENHYDRAMINE HCL 25 MG
25-50 TABLET ORAL
Status: ON HOLD | COMMUNITY
End: 2020-06-22

## 2020-06-14 RX ORDER — MAGNESIUM HYDROXIDE/ALUMINUM HYDROXICE/SIMETHICONE 120; 1200; 1200 MG/30ML; MG/30ML; MG/30ML
20 SUSPENSION ORAL 4 TIMES DAILY PRN
Status: ON HOLD | COMMUNITY
End: 2020-06-22

## 2020-06-14 RX ORDER — ACETAMINOPHEN 325 MG/1
650 TABLET ORAL EVERY 4 HOURS PRN
Status: ON HOLD | COMMUNITY
End: 2020-06-22

## 2020-06-14 RX ORDER — CALCIUM CARBONATE 500 MG/1
2 TABLET, CHEWABLE ORAL
Status: ON HOLD | COMMUNITY
End: 2020-06-22

## 2020-06-14 RX ORDER — PHENYLEPHRINE HCL 10 MG/1
10 TABLET, FILM COATED ORAL EVERY 4 HOURS PRN
Status: DISCONTINUED | OUTPATIENT
Start: 2020-06-14 | End: 2020-06-14

## 2020-06-14 RX ORDER — DIPHENHYDRAMINE HCL 25 MG
25-50 TABLET ORAL
Status: DISCONTINUED | OUTPATIENT
Start: 2020-06-14 | End: 2020-06-23 | Stop reason: HOSPADM

## 2020-06-14 RX ORDER — DIPHENHYDRAMINE HCL 25 MG
50 CAPSULE ORAL 3 TIMES DAILY PRN
Status: ON HOLD | COMMUNITY
End: 2020-06-14

## 2020-06-14 RX ORDER — MAGNESIUM HYDROXIDE/ALUMINUM HYDROXICE/SIMETHICONE 120; 1200; 1200 MG/30ML; MG/30ML; MG/30ML
20 SUSPENSION ORAL 4 TIMES DAILY PRN
Status: DISCONTINUED | OUTPATIENT
Start: 2020-06-14 | End: 2020-06-14

## 2020-06-14 RX ORDER — ALUMINA, MAGNESIA, AND SIMETHICONE 2400; 2400; 240 MG/30ML; MG/30ML; MG/30ML
30 SUSPENSION ORAL EVERY 6 HOURS PRN
Status: DISCONTINUED | OUTPATIENT
Start: 2020-06-14 | End: 2020-06-23 | Stop reason: HOSPADM

## 2020-06-14 RX ORDER — PHENYLEPHRINE HCL 10 MG/1
10 TABLET, FILM COATED ORAL EVERY 4 HOURS PRN
Status: ON HOLD | COMMUNITY
End: 2020-06-22

## 2020-06-14 RX ORDER — LOPERAMIDE HCL 2 MG
2 CAPSULE ORAL 4 TIMES DAILY PRN
Status: DISCONTINUED | OUTPATIENT
Start: 2020-06-14 | End: 2020-06-23 | Stop reason: HOSPADM

## 2020-06-14 RX ORDER — LAMOTRIGINE 200 MG/1
200 TABLET ORAL DAILY
Status: DISCONTINUED | OUTPATIENT
Start: 2020-06-14 | End: 2020-06-17

## 2020-06-14 RX ORDER — TRAZODONE HYDROCHLORIDE 50 MG/1
50 TABLET, FILM COATED ORAL
Status: DISCONTINUED | OUTPATIENT
Start: 2020-06-14 | End: 2020-06-23 | Stop reason: HOSPADM

## 2020-06-14 RX ORDER — ZIPRASIDONE HYDROCHLORIDE 60 MG/1
60 CAPSULE ORAL 2 TIMES DAILY WITH MEALS
Status: ON HOLD | COMMUNITY
End: 2020-06-22

## 2020-06-14 RX ORDER — CALCIUM CARBONATE 500 MG/1
1000 TABLET, CHEWABLE ORAL 4 TIMES DAILY PRN
Status: DISCONTINUED | OUTPATIENT
Start: 2020-06-14 | End: 2020-06-23 | Stop reason: HOSPADM

## 2020-06-14 RX ORDER — PROTRIPTYLINE HYDROCHLORIDE 10 MG/1
10 TABLET, FILM COATED ORAL
Status: DISCONTINUED | OUTPATIENT
Start: 2020-06-14 | End: 2020-06-23 | Stop reason: HOSPADM

## 2020-06-14 RX ORDER — HYDROXYZINE HYDROCHLORIDE 25 MG/1
25-50 TABLET, FILM COATED ORAL 3 TIMES DAILY PRN
Status: DISCONTINUED | OUTPATIENT
Start: 2020-06-14 | End: 2020-06-22

## 2020-06-14 RX ORDER — OLANZAPINE 10 MG/2ML
10 INJECTION, POWDER, FOR SOLUTION INTRAMUSCULAR
Status: DISCONTINUED | OUTPATIENT
Start: 2020-06-14 | End: 2020-06-23 | Stop reason: HOSPADM

## 2020-06-14 RX ORDER — LAMOTRIGINE 25 MG/1
25 TABLET ORAL DAILY
Status: DISCONTINUED | OUTPATIENT
Start: 2020-06-14 | End: 2020-06-14

## 2020-06-14 RX ORDER — QUETIAPINE FUMARATE 50 MG/1
50 TABLET, FILM COATED ORAL AT BEDTIME
Status: ON HOLD | COMMUNITY
End: 2020-06-22

## 2020-06-14 RX ORDER — NEOMYCIN/BACITRACIN/POLYMYXINB 3.5-400-5K
OINTMENT (GRAM) TOPICAL 2 TIMES DAILY PRN
Status: ON HOLD | COMMUNITY
End: 2020-06-22

## 2020-06-14 RX ORDER — NEOMYCIN/BACITRACIN/POLYMYXINB 3.5-400-5K
OINTMENT (GRAM) TOPICAL 2 TIMES DAILY PRN
Status: DISCONTINUED | OUTPATIENT
Start: 2020-06-14 | End: 2020-06-23 | Stop reason: HOSPADM

## 2020-06-14 RX ORDER — ZIPRASIDONE HYDROCHLORIDE 60 MG/1
60 CAPSULE ORAL 2 TIMES DAILY WITH MEALS
Status: DISCONTINUED | OUTPATIENT
Start: 2020-06-14 | End: 2020-06-23 | Stop reason: HOSPADM

## 2020-06-14 RX ORDER — HYDROXYZINE HYDROCHLORIDE 25 MG/1
25 TABLET, FILM COATED ORAL 3 TIMES DAILY PRN
Status: ON HOLD | COMMUNITY
End: 2020-06-14

## 2020-06-14 RX ORDER — BENZOCAINE/MENTHOL 6 MG-10 MG
LOZENGE MUCOUS MEMBRANE 2 TIMES DAILY PRN
Status: ON HOLD | COMMUNITY
End: 2020-06-22

## 2020-06-14 RX ORDER — ROPINIROLE 1 MG/1
1 TABLET, FILM COATED ORAL AT BEDTIME
Status: DISCONTINUED | OUTPATIENT
Start: 2020-06-14 | End: 2020-06-23 | Stop reason: HOSPADM

## 2020-06-14 RX ADMIN — LAMOTRIGINE 200 MG: 200 TABLET ORAL at 21:18

## 2020-06-14 RX ADMIN — ROPINIROLE HYDROCHLORIDE 1 MG: 1 TABLET, FILM COATED ORAL at 21:18

## 2020-06-14 RX ADMIN — RAMELTEON 8 MG: 8 TABLET ORAL at 21:18

## 2020-06-14 RX ADMIN — ESZOPICLONE 3 MG: 3 TABLET, FILM COATED ORAL at 22:09

## 2020-06-14 RX ADMIN — ZIPRASIDONE HCL 60 MG: 60 CAPSULE ORAL at 21:18

## 2020-06-14 RX ADMIN — QUETIAPINE FUMARATE 50 MG: 50 TABLET ORAL at 21:18

## 2020-06-14 RX ADMIN — PROTRIPTYLINE HYDROCHLORIDE 10 MG: 10 TABLET ORAL at 21:18

## 2020-06-14 ASSESSMENT — ENCOUNTER SYMPTOMS
ABDOMINAL PAIN: 0
APPETITE CHANGE: 1
FEVER: 0
SHORTNESS OF BREATH: 0

## 2020-06-14 ASSESSMENT — ACTIVITIES OF DAILY LIVING (ADL)
RETIRED_COMMUNICATION: 0-->UNDERSTANDS/COMMUNICATES WITHOUT DIFFICULTY
AMBULATION: 0-->INDEPENDENT
RETIRED_COMMUNICATION: 0-->UNDERSTANDS/COMMUNICATES WITHOUT DIFFICULTY
TRANSFERRING: 0-->INDEPENDENT
RETIRED_EATING: 0-->INDEPENDENT
BATHING: 0-->INDEPENDENT
COGNITION: 0 - NO COGNITION ISSUES REPORTED
ORAL_HYGIENE: INDEPENDENT;PROMPTS
DRESS: 0-->INDEPENDENT
AMBULATION: 0-->INDEPENDENT
TRANSFERRING: 0-->INDEPENDENT
FALL_HISTORY_WITHIN_LAST_SIX_MONTHS: NO
SWALLOWING: 0-->SWALLOWS FOODS/LIQUIDS WITHOUT DIFFICULTY
COGNITION: 0 - NO COGNITION ISSUES REPORTED
SWALLOWING: 0-->SWALLOWS FOODS/LIQUIDS WITHOUT DIFFICULTY
LAUNDRY: WITH SUPERVISION
TOILETING: 0-->INDEPENDENT
DRESS: 0-->INDEPENDENT
FALL_HISTORY_WITHIN_LAST_SIX_MONTHS: NO
RETIRED_EATING: 0-->INDEPENDENT
HYGIENE/GROOMING: INDEPENDENT;PROMPTS
TOILETING: 0-->INDEPENDENT
DRESS: INDEPENDENT;PROMPTS
BATHING: 0-->INDEPENDENT

## 2020-06-14 ASSESSMENT — MIFFLIN-ST. JEOR: SCORE: 1647.97

## 2020-06-14 NOTE — ED NOTES
Pt placed on LENCHO.    Belongings bagged and placed in  [employer].    Meagan Tidwell RN,.......................................... 6/13/2020   10:32 PM

## 2020-06-14 NOTE — ED NOTES
MD at bedside to see pt.  Meagan Tidwell RN,.......................................... 6/13/2020   11:00 PM

## 2020-06-14 NOTE — ED NOTES
Assumed care of pt. Pt given warm blankets. Lights turned down low. Denies any other needs. Pt remains withdrawn.

## 2020-06-14 NOTE — TELEPHONE ENCOUNTER
1220pm - Juanjo ([facility] ED) called inquiring if intake was moving forward with the pt for an IP MH bed after being seen by Truman this morning. Truman psych consult recommends IP MH admission. Intake to follow up with Truman   DEC assessment in chart - DECs recommendation is to have pt in ED and have a psych consult done.   1225pm - Intake connected with 77, no one able to cohort at this time, no bed available  1226pm - Truman paged to gather clinical and inquire about moving forward with a MH admission.  1228pm - ANS paged to inquire about ability to cohort on the units   1242pm - HUC on 32 confirms discharge today   107pm - ANS reports she went through and there are no other opportunities where pts can cohort in order to create open doubles and private rooms for new admissions.  109pm - Truman paged again  132pm - Truman confirms moving forward with IP MH admission    Per chart review:   S: Truman, [facility] ED, 34/F, SI and decompensation    B: Pt presents from IR facility after decompensating for the last week. Not eating, not taking her medications, no engaging in programing  SI w plan to possible hang herself  Pt has many IP MH admissions at [facility] and [place]     Medically cleared, eating, drinking, ambulating independently  Patient cleared and ready for behavioral bed placement: Yes   No covid symptoms, swab ordered and in process    A: Voluntary   Under Commitment w Travis Sr     R: 32/Truman/Alexandra    135pm - Alexandra, on call provider paged   207pm - Alexandra paged   231pm - Alexandra accepts   Pt placed in que   235pm - unit charge notified, 4pm for report  237pm - ED notified

## 2020-06-14 NOTE — ED PROVIDER NOTES
"  History     Chief Complaint:  Suicidal       The history is provided by the patient, the EMS personnel and a caregiver.      Misty Parham is a 34 year old female with a history of suicidal behavior who presents from [facility] via EMS for evaluation of suicidal ideation. The patient is currently at [facility] after being admitted from the ED due to a major depressive episode and the staff called EMS because they were worried about suicidal ideation. The staff reported hearing her say \"this would be strong enough to end me.\" She has access to bed sheets in her room. She has not taken her medication in six days and is not leaving her room. She reports lack of appetite and states that she hasn't take her medication because they are hard to take on an empty stomach and she doesn't believe they are working. She states that she thinks this is an overreaction and she is just trying to work through her problems and wants to sleep.    Allergies:  No Known Allergies     Medications:    Eszopiclone   Hydroxyzine   Lamotrigine   Protriptyline   Ramelteon   Ropinirole   Ziprasidone     Past Medical History:    Allergic rhinitis  Borderline personality disorder  Depression  Insomnia   Obesity   Suicidal behavior  Suicidal ideation     Past Surgical History:    Cholecystectomy      Family History:    Alcohol abuse   Breast cancer  Diabetes  DVT/PE  Hypertension   Hypercholesterolemia   Lung cancer    Social History:  Marital Status:  Legally  [3]  PCP: Monse Chen   Negative for tobacco use.  Negative for alcohol use. Former  Negative for drug use.      Review of Systems   Constitutional: Positive for appetite change. Negative for fever.   Respiratory: Negative for shortness of breath.    Cardiovascular: Negative for chest pain.   Gastrointestinal: Negative for abdominal pain.   Psychiatric/Behavioral: Positive for suicidal ideas.   All other systems reviewed and are " "negative.      Physical Exam     Patient Vitals for the past 24 hrs:   BP Temp Temp src Heart Rate Resp SpO2 Height Weight   06/14/20 0136 108/74 -- -- -- -- 99 % -- --   06/13/20 2228 119/79 98.1  F (36.7  C) Oral 95 18 96 % 1.651 m (5' 5\") 103.4 kg (228 lb)       Physical Exam  General: Appears well-developed and well-nourished.   Head: No signs of trauma.   CV: Normal rate and regular rhythm.    Resp: Effort normal and breath sounds normal. No respiratory distress.   GI: Soft. There is no tenderness.  No rebound or guarding.  Normal bowel sounds.    MSK: Normal range of motion.  Neuro: The patient is alert and oriented.  Speech normal.  Skin: Skin is warm and dry. No rash noted.   Psych: Calm and cooperative      Emergency Department Course     Laboratory:  Laboratory findings were communicated with the patient and caregiver who voiced understanding of the findings.    CBC: WBC: 7.4, HGB: 14.7, PLT: 311    BMP: Glucose 103(H), o/w WNL (Creatinine: 1.04)    Procedures:  None    Emergency Department Course:  Past medical records, nursing notes, and vitals reviewed.    2327 I performed an exam of the patient as documented above.     IV was inserted and blood was drawn for laboratory testing, results above.    The patient was signed out to Dr. Geiger, oncoming ED physician, pending psych consult.    I personally reviewed the laboratory results with the Patient and answered all related questions prior to sign out.     Impression & Plan     Medical Decision Making:  Misty Parham is a 34-year-old woman who presents due to concerns for suicidal ideation.  She has been at [facility] and apparently there was concerns regarding some statements that she had made so EMS was called.  On my evaluation of the patient, she states that everyone overreacted and she was just trying to work through her emotions.  DEC did evaluate the patient and felt it would be in the patient's best interest to have her wait until the " morning for a psychiatry consult as the patient is already in a monitored setting and may not need full psychiatric admission.  Patient was calm and cooperative throughout my care for.  Patient was signed out to Dr. Geiger with psychiatric consultation pending.      Diagnosis:    ICD-10-CM    1. Suicidal ideation  R45.851        Disposition:  Signed out to Dr. Geiger, pending psych consult.    Scribe Disclosure:  I, Babs Monsalve, am serving as a scribe at 11:27 PM on 6/13/2020 to document services personally performed by Gustabo Reardon MD based on my observations and the provider's statements to me.      EMERGENCY DEPARTMENT       Gustabo Reardon MD  06/14/20 0560

## 2020-06-14 NOTE — ED NOTES
"Pt here for Psychiatric Evaluation after making suicidal comments to staff at [facility] where she has a commitment following admission last month from here. Pt was overheard making statements like \"this aurora be strong enough to hold me\" or \"this would be strong enough to end me.\" Pt does have access to bed sheets in her room. Pt has hx of chronic SI. Staff informed EMS that pt has not taken med's in 6 days and does not leave her room. Pt states that she has been intermittently taking medication and informed writer that she has not had an appetite and \"It's  hard to take medicine when there is nothing in your stomach.\" Pt is very quiet and doesn't look at writer. Pt stares on the floor and into space when answering questions. Pt also states that she has not been taking medication because \"It doesn't work.\"    [facility] staff available for infor at Ph. # [phone] until 11 pm.    Meagan Tidwell RN,.......................................... 6/13/2020   10:40 PM    "

## 2020-06-14 NOTE — ED NOTES
Bed: [facility]  Expected date:   Expected time:   Means of arrival:   Comments:  447  34F SI/Hold  Negative covid screening  221

## 2020-06-14 NOTE — CONSULTS
[facility]  Psychiatry Consultation      Patient name: Misty Parham   MRN: [id number]    Age: 34 year old    YOB: 1985    Identifying information:   The patient is a 34 year old White female who is currently evaluated in the emergency room.    Reason for consult: Evaluate acute suicide risk and comment on disposition    History of present illness:   The patient has a history of major depressive disorder, generalized anxiety disorder, and borderline personality disorder who is familiar to me from her recent hospitalization where she was discharged on May 8 and transitioned to [facility] for intensive residential treatment services while under involuntary commitment for treatment of mental illness with a Robles Sr order to support treatment with ECT.  She returns to the emergency room for evaluation of worsening suicidal ideation with documentation reflecting reports that she has not been eating for at least 1 week, has been withdrawn and not attending programming at the residential treatment facility.  There was also some mention of a recent conflict with her  which may have contributed to her decompensation.  She had verbalized suicidal ideation, was not able to contract for safety, and made indirect comments raising concern for the risk of hanging as a means of suicide.  Psychiatry was consulted to further evaluate her acute suicide risk and comment on disposition.  On examination today, the patient recalls that the first few weeks at the treatment facility went fairly well.  She was attending groups and was able to follow-up with her outpatient psychiatrist.  She admits that she was not very diligent in keeping up with her medications, and although staff would frequently remind her to take her medications, she began refusing to cooperate and stopped eating meals.  The residential treatment facility no longer felt that they were able to keep her safe and  "referred her to the emergency room.  She request to be discharged home however states she has not has no intention on eating or taking her medications after returning home.  She tells me that she will not communicate with her  because \"I do not get along with her.\"  She endorsed suicidal ideation however did not engage in a discussion to divulge any specific details.  She was agreeable for psychiatric hospitalization.    Psychiatric Review of Systems:    -- Depressive episode: Mood is depressed, characterized as severe, accompanied with low energy, low appetite, anhedonia, feels helpless and hopeless.  She endorses suicidal ideation.  She denies homicidal ideation.  -- Trixie:  denies symptoms  -- Psychosis:  denies symptoms  -- Anxiety: Endorses anxiety, moderate intensity, present at baseline however to a lesser intensity.  -- PTSD: denies symptoms  -- OCD: denies  symptoms  -- Eating disorder: denies symptoms    Psychiatric History:    The patient has an established diagnosis of major depressive disorder and suspected borderline personality disorder.  She has a history of self-injurious behavior through superficial scratching during moments of emotional intensity.  She has been hospitalized numerous times in the past, both through [facility] as well as [facility].  She is currently under involuntary commitment for treatment of mental illness with a Robles Sr order.    Her last sessions of ECT occurred in April 2020 at [facility]. She has tried numerous medication trials, some of which have included Zoloft, Prozac, Lexapro, Paxil, Effexor, Pristiq, Remeron, Wellbutrin, Trintellix, lithium, Lamictal, Rexulti, protriptyline and nortriptyline.      Substance Use History:    Denies using illicit substances or alcohol corresponding to a diagnosis of abuse or dependence. No prior chemical dependency treatments reported.    Medical History:  Refer to the internal medicine notes " which I reviewed.     No current facility-administered medications for this encounter.     Current Outpatient Medications:      acetaminophen (TYLENOL) 325 MG tablet, Take 650 mg by mouth every 4 hours as needed for mild pain, Disp: , Rfl:      alum & mag hydroxide-simethicone (MAALOX) 200-200-20 MG/5ML SUSP suspension, Take 20 mLs by mouth 4 times daily as needed for indigestion, Disp: , Rfl:      calcium carbonate (TUMS) 500 MG chewable tablet, Take 2 chew tab by mouth every 2 hours as needed for heartburn, Disp: , Rfl:      diphenhydrAMINE (BENADRYL) 25 MG capsule, Take 50 mg by mouth 3 times daily as needed for allergies or other (anxiety), Disp: , Rfl:      diphenhydrAMINE (BENADRYL) 25 MG tablet, Take 25-50 mg by mouth nightly as needed for sleep, Disp: , Rfl:      eszopiclone (LUNESTA) 3 MG tablet, Take 1 tablet (3 mg) by mouth At Bedtime, Disp: 30 tablet, Rfl: 0     guaiFENesin (ROBITUSSIN) 100 MG/5ML SYRP, Take 10 mLs by mouth every 4 hours as needed for cough, Disp: , Rfl:      hydrocortisone (CORTAID) 1 % external cream, Apply topically 2 times daily as needed, Disp: , Rfl:      hydrOXYzine (ATARAX) 25 MG tablet, Take 25 mg by mouth 3 times daily as needed for anxiety, Disp: , Rfl:      lamoTRIgine (LAMICTAL) 200 MG tablet, Take 1 tablet (200 mg) by mouth daily, Disp: 30 tablet, Rfl: 0     loperamide (IMODIUM) 2 MG capsule, Take 2 mg by mouth 4 times daily as needed for diarrhea, Disp: , Rfl:      magnesium hydroxide (MILK OF MAGNESIA) 400 MG/5ML suspension, Take 30 mLs by mouth daily as needed for constipation or heartburn, Disp: , Rfl:      neomycin-bacitracin-polymyxin (NEOSPORIN) 5-400-5000 ointment, Apply topically 2 times daily as needed (abrasions), Disp: , Rfl:      phenylephrine HCl 10 MG TABS, Take 10 mg by mouth every 4 hours as needed, Disp: , Rfl:      protriptyline (VIVACTIL) 10 MG tablet, Take 1 tablet (10 mg) by mouth daily (with dinner), Disp: 30 tablet, Rfl: 0     QUEtiapine (SEROQUEL)  "50 MG tablet, Take 50 mg by mouth At Bedtime, Disp: , Rfl:      ramelteon (ROZEREM) 8 MG tablet, Take 1 tablet (8 mg) by mouth At Bedtime, Disp: 30 tablet, Rfl: 0     rOPINIRole (REQUIP) 1 MG tablet, Take 1 tablet (1 mg) by mouth At Bedtime, Disp: 30 tablet, Rfl: 0     ziprasidone (GEODON) 60 MG capsule, Take 60 mg by mouth 2 times daily (with meals), Disp: , Rfl:      hydrOXYzine (ATARAX) 25 MG tablet, Take 1-2 tablets (25-50 mg) by mouth 3 times daily as needed for anxiety, Disp: 30 tablet, Rfl: 0     Social History:  Refer to the psychosocial assessment completed by our .  She is  however  from her .  She has 3 children who are living with family members at the moment.  The patient's most recent place of residence was with her mother.  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  No legal issues reported.     Family History:    Notable for depression and possible substance use disorders involving alcohol.  No knowledge of completed suicides in the family.    Vital Signs:    B/P: 110/74, T: 98.1, P: Data Unavailable, R: 18  Estimated body mass index is 37.94 kg/m  as calculated from the following:    Height as of this encounter: 1.651 m (5' 5\").    Weight as of this encounter: 103.4 kg (228 lb).       Mental status examination:  Appearance: Alert, dressed in hospital scrubs, laying in bed, appeared slightly fatigued.  Attitude: Cooperative however mostly passive.  Eye Contact: Fair  Mood:  Depressed  Affect: Mood congruent and blunted  Speech: Minimal in content.  Soft volume.  Psychomotor Behavior:  No psychomotor abnormalities noted  Thought Process: Linear and logical; not tangential or circumstantial or disorganized  Associations:  Logical; no loose associations Noted  Thought Content:  No obvious paranoia, delusions, ideas of reference, or grandiosity noted. Denies auditory or visual hallucinations.  Endorsed suicidal " Ideations. Denies homicidal ideations.  Insight:  Fair  Judgment:  Fair  Oriented to:  time, person, and place  Attention Span and Concentration:  Intact  Recent and Remote Memory: Intact based on interviewing and details provided  Language: Appropriate based on interviewing  Fund of Knowledge: Appropriate based on interviewing  Muscle Strength and Tone: Normal upon visual inspection  Gait and Station: Normal upon visual inspection            Diagnoses:    Major depressive disorder, severe, with psychotic features  Generalized anxiety disorder  Borderline personality disorder     Recommendations:  The patient remains at risk of continued decompensation which will further elevate her acute suicide risk noting that the patient will likely not comply with taking her psychotropic medications, not follow-up with outpatient providers, and will not communicate with her  if discharged from the hospital today.  The likelihood of engaging her in her care plan will likely improve by reducing the intensity of her depressive symptoms and suicidal ideation.    For the reasons mentioned above, I would recommend pursuing inpatient psychiatric hospitalization.  The patient agrees to cooperate voluntarily.    Treatment interventions to consider after arriving to the unit would include increasing protriptyline to better address mood and anxiety symptoms.  Consider restarting ECT however the patient would prefer not to resume ECT sessions at this time.  Closely monitor the severity of her vegetative symptoms, intensity of suicidal thoughts, and food intake to determine if resuming ECT under the price [facility] would be warranted.    Please reconsult with psychiatry as needed.   Baron Laughlin MD   Text Page     Visit/Communication Style   VIRTUAL (VIDEO) communication was used to evaluate Misty due to the COVID pandemic.    Misty consented to the use of video communication: yes  Video START time: 10:50 AM,  6/14/2020  Video STOP time: 11:15 AM, 6/14/2020   Patient's location: [facility]   Provider's location during the visit: Home

## 2020-06-14 NOTE — ED PROVIDER NOTES
I took over care of this patient from my partner, Dr. Reardon, at approximately 0600.    Briefly, patient presented with suicidal thoughts in the setting of chronic psychiatric illness.  I was asked to assume care, pending formal psychiatric consultation this morning.    I checked on patient in room  1 at 815.  I spoke with the ED mental health nurse.  Patient is resting calmly.  No acute needs.    Pt seen by Dr. Laughlin, Psychiatrist, in formal consultation, and he recommends psych hospitalization.    I personally called central intake at 1216, and the patient was not yet on the list for admission.  She is now on the list to seek an inpatient psychiatric bed, though a bed not yet been secured.  Care will be signed out to my colleague Dr. Webber at 1400 shift change.  Paperwork completed in anticipation of probable need for transfer.    MD Juanjo Najera Jeffrey Alan, MD  06/14/20 3005

## 2020-06-14 NOTE — PROGRESS NOTES
06/14/20 1829   Patient Belongings   Did you bring any home meds/supplements to the hospital?  No   Patient Belongings locker   Patient Belongings Put in Hospital Secure Location (Security or Locker, etc.) none   Belongings Search Yes   Clothing Search Yes   Second Staff Nurse Ro LONDON    Patient belongings in locker: Black Navdeep headphones, iphone, black shoes, blue shirt, gray sweater, bra, sweat pants, phone .     Belongings sent to security: None    A               Admission:  I am responsible for any personal items that are not sent to the safe or pharmacy.  [facility] is not responsible for loss, theft or damage of any property in my possession.    Signature:  _________________________________ Date: _______  Time: _____                                              Staff Signature:  ____________________________ Date: ________  Time: _____      2nd Staff person, if patient is unable/unwilling to sign:    Signature: ________________________________ Date: ________  Time: _____     Discharge:  [facility] has returned all of my personal belongings:    Signature: _________________________________ Date: ________  Time: _____                                          Staff Signature:  ____________________________ Date: ________  Time: _____

## 2020-06-14 NOTE — ED NOTES
Report off to justin SCHWARTZ.  Meagan Tidwell RN,.......................................... 6/13/2020   11:09 PM

## 2020-06-14 NOTE — ED NOTES
DEC at bedside to see pt.  Meagan Tidwell RN,.......................................... 6/13/2020   10:40 PM

## 2020-06-14 NOTE — PHARMACY-ADMISSION MEDICATION HISTORY
Pharmacy Medication History  Admission medication history interview status for the 6/13/2020  admission is complete. See EPIC admission navigator for prior to admission medications     Medication history sources: MAR ([facility])  Medication history source reliability: Good  Adherence assessment: Good    Significant changes made to the medication list:        Additional medication history information:       Medication reconciliation completed by provider prior to medication history? No    Time spent in this activity: 25 min      Prior to Admission medications    Medication Sig Last Dose Taking? Auth Provider   acetaminophen (TYLENOL) 325 MG tablet Take 650 mg by mouth every 4 hours as needed for mild pain Past Week at Unknown time Yes Unknown, Entered By History   alum & mag hydroxide-simethicone (MAALOX) 200-200-20 MG/5ML SUSP suspension Take 20 mLs by mouth 4 times daily as needed for indigestion Past Week at Unknown time Yes Unknown, Entered By History   calcium carbonate (TUMS) 500 MG chewable tablet Take 2 chew tab by mouth every 2 hours as needed for heartburn Past Week at Unknown time Yes Unknown, Entered By History   diphenhydrAMINE (BENADRYL) 25 MG capsule Take 50 mg by mouth 3 times daily as needed for allergies or other (anxiety) Past Week at Unknown time Yes Unknown, Entered By History   diphenhydrAMINE (BENADRYL) 25 MG tablet Take 25-50 mg by mouth nightly as needed for sleep Past Week at Unknown time Yes Unknown, Entered By History   eszopiclone (LUNESTA) 3 MG tablet Take 1 tablet (3 mg) by mouth At Bedtime 6/13/2020 at Unknown time Yes Baron Laughlin MD   guaiFENesin (ROBITUSSIN) 100 MG/5ML SYRP Take 10 mLs by mouth every 4 hours as needed for cough Past Week at Unknown time Yes Unknown, Entered By History   hydrocortisone (CORTAID) 1 % external cream Apply topically 2 times daily as needed Past Week at Unknown time Yes Unknown, Entered By History   hydrOXYzine (ATARAX) 25 MG tablet Take 25 mg by  mouth 3 times daily as needed for anxiety Past Week at Unknown time Yes Unknown, Entered By History   lamoTRIgine (LAMICTAL) 200 MG tablet Take 1 tablet (200 mg) by mouth daily 6/13/2020 at am Yes Baron Laughlin MD   loperamide (IMODIUM) 2 MG capsule Take 2 mg by mouth 4 times daily as needed for diarrhea Past Week at Unknown time Yes Unknown, Entered By History   magnesium hydroxide (MILK OF MAGNESIA) 400 MG/5ML suspension Take 30 mLs by mouth daily as needed for constipation or heartburn Past Week at Unknown time Yes Unknown, Entered By History   neomycin-bacitracin-polymyxin (NEOSPORIN) 5-400-5000 ointment Apply topically 2 times daily as needed (abrasions) Past Week at Unknown time Yes Unknown, Entered By History   phenylephrine HCl 10 MG TABS Take 10 mg by mouth every 4 hours as needed Past Week at Unknown time Yes Unknown, Entered By History   protriptyline (VIVACTIL) 10 MG tablet Take 1 tablet (10 mg) by mouth daily (with dinner) 6/13/2020 at Unknown time Yes Baron Laughlin MD   QUEtiapine (SEROQUEL) 50 MG tablet Take 50 mg by mouth At Bedtime 6/13/2020 at Unknown time Yes Unknown, Entered By History   ramelteon (ROZEREM) 8 MG tablet Take 1 tablet (8 mg) by mouth At Bedtime 6/13/2020 at Unknown time Yes Baron Laughlin MD   rOPINIRole (REQUIP) 1 MG tablet Take 1 tablet (1 mg) by mouth At Bedtime 6/13/2020 at Unknown time Yes Baron Laughlin MD   ziprasidone (GEODON) 60 MG capsule Take 60 mg by mouth 2 times daily (with meals) 6/13/2020 at Unknown time Yes Unknown, Entered By History   hydrOXYzine (ATARAX) 25 MG tablet Take 1-2 tablets (25-50 mg) by mouth 3 times daily as needed for anxiety   Baron Laguhlin MD

## 2020-06-15 LAB — LAMOTRIGINE SERPL-MCNC: 4.6 UG/ML (ref 2.5–15)

## 2020-06-15 PROCEDURE — 99223 1ST HOSP IP/OBS HIGH 75: CPT | Performed by: NURSE PRACTITIONER

## 2020-06-15 PROCEDURE — 12400001 ZZH R&B MH UMMC

## 2020-06-15 PROCEDURE — 25000132 ZZH RX MED GY IP 250 OP 250 PS 637: Performed by: NURSE PRACTITIONER

## 2020-06-15 RX ORDER — QUETIAPINE FUMARATE 50 MG/1
50 TABLET, EXTENDED RELEASE ORAL EVERY EVENING
Status: DISCONTINUED | OUTPATIENT
Start: 2020-06-15 | End: 2020-06-22

## 2020-06-15 RX ADMIN — ROPINIROLE HYDROCHLORIDE 1 MG: 1 TABLET, FILM COATED ORAL at 21:41

## 2020-06-15 RX ADMIN — QUETIAPINE FUMARATE 50 MG: 50 TABLET, EXTENDED RELEASE ORAL at 21:41

## 2020-06-15 RX ADMIN — ESZOPICLONE 3 MG: 3 TABLET, FILM COATED ORAL at 21:41

## 2020-06-15 RX ADMIN — ZIPRASIDONE HCL 60 MG: 60 CAPSULE ORAL at 18:39

## 2020-06-15 RX ADMIN — RAMELTEON 8 MG: 8 TABLET ORAL at 21:41

## 2020-06-15 RX ADMIN — PROTRIPTYLINE HYDROCHLORIDE 10 MG: 10 TABLET ORAL at 18:39

## 2020-06-15 RX ADMIN — ZIPRASIDONE HCL 60 MG: 60 CAPSULE ORAL at 09:25

## 2020-06-15 RX ADMIN — LAMOTRIGINE 200 MG: 200 TABLET ORAL at 09:25

## 2020-06-15 ASSESSMENT — ACTIVITIES OF DAILY LIVING (ADL)
HYGIENE/GROOMING: PROMPTS
DRESS: SCRUBS (BEHAVIORAL HEALTH)
LAUNDRY: WITH SUPERVISION
HYGIENE/GROOMING: PROMPTS
ORAL_HYGIENE: PROMPTS
ORAL_HYGIENE: PROMPTS
DRESS: SCRUBS (BEHAVIORAL HEALTH)

## 2020-06-15 NOTE — PHARMACY-ADMISSION MEDICATION HISTORY
Please see Admission Medication History note completed by pharmacist on 06/13/2020 under previous encounter at [facility] for information regarding prior to admission medications.    Lunesta 05/08/2020 for quantity 30 for 30 day supply    Mary Lou Link, PharmD, BCPP  Behavioral Health Pharmacist  [facility] ([facility])  [facility] Ascom: [phone] or [phone]  [facility] Phone: [phone]  Emergency Room Ascom: *[phone]  Emergency Room Pharmacist phone: [phone]
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
## 2020-06-15 NOTE — PROGRESS NOTES
"CLINICAL NUTRITION SERVICES - ASSESSMENT NOTE     Nutrition Prescription    RECOMMENDATIONS FOR MDs/PROVIDERS TO ORDER:  Reweigh pt    Malnutrition Status:    At least non-severe malnutrition in the context of acute illness however unable to fully assess d/t no NFPA completed    Recommendations already ordered by Registered Dietitian (RD):  Boost BID (B/D)  Additional snacks/supplements PRN    Future/Additional Recommendations:  Monitor intakes   Adjust supplements as needed pending pt preferences/intakes  Wt for trend   Unable to obtain in-person nutrition history or nutrition focused physical assessment (NFPA) from patient to limit exposure and to minimize use of PPE during COVID-19 pandemic. Attempted X3 to talk to pt over the phone however she was unavailable (w/ provider, eating lunch, sleeping). Information obtained through chart review.    REASON FOR ASSESSMENT  Chasity Perkins is a 34 year old female assessed by the dietitian for Admission Nutrition Risk Screen for reduced oral intake over the last month    PMH significant for bipolar disorder admitted for SI and depression.  NUTRITION HISTORY  Per documentation pt has not been eating over the past week.   She states she hasn't eaten or drank for seven days - \"I did research and read people die within 7 to 10 days if they don't eat or drink.\"  Weight consistently around 225-230# per documented wt history. Now with 17# wt loss in 1 day, recommend reweight.   CURRENT NUTRITION ORDERS  Diet: Regular  Intake/Tolerance: Ate some crackers last night (6/14), no documented intakes today. Recommend adding boost q meal, RD to follow up with pt when available.     LABS  Labs reviewed:  Results for CHASITY PERKINS (MRN [id number]) as of 6/15/2020 09:38   Ref. Range 6/14/2020 00:00   Sodium Latest Ref Range: 133 - 144 mmol/L 137   Potassium Latest Ref Range: 3.4 - 5.3 mmol/L 3.7   Chloride Latest Ref Range: 94 - 109 mmol/L 105   Carbon Dioxide Latest Ref Range: 20 - 32 " "mmol/L 27   Urea Nitrogen Latest Ref Range: 7 - 30 mg/dL 8   Creatinine Latest Ref Range: 0.52 - 1.04 mg/dL 1.04   GFR Estimate Latest Ref Range: >60 mL/min/1.73_m2 69   GFR Estimate If Black Latest Ref Range: >60 mL/min/1.73_m2 80   Calcium Latest Ref Range: 8.5 - 10.1 mg/dL 9.3   Anion Gap Latest Ref Range: 3 - 14 mmol/L 5     MEDICATIONS  Medications reviewed    ANTHROPOMETRICS  Height: 165.1 cm (5' 5\")  Most Recent Weight: 95.7 kg (211 lb)    IBW: 56.8 kg  BMI: 35.11 kg/m^2, Obesity Grade II BMI 35-39.9  Weight History: 17# wt loss in 1 day, likely d/t error  Wt Readings from Last 10 Encounters:   06/14/20 95.7 kg (211 lb)   06/13/20 103.4 kg (228 lb)   05/07/20 103.8 kg (228 lb 14.4 oz)   04/21/20 103.3 kg (227 lb 11.2 oz)   02/18/20 105.2 kg (232 lb)   02/17/20 103 kg (227 lb)   01/14/20 103.2 kg (227 lb 9.6 oz)   10/22/19 104.8 kg (231 lb)   09/17/19 100.3 kg (221 lb 1.6 oz)   07/02/19 104.9 kg (231 lb 4.8 oz)     Dosing Weight: 66.5 kg (adjusted using current wt of 95.7 kg and ideal wt of 56.8 kg    ASSESSED NUTRITION NEEDS  Estimated Energy Needs: 0889-7939 kcals/day (20 - 25 kcals/kg)  Justification: Obese  Estimated Protein Needs: 67-80 grams protein/day (1 - 1.2 grams of pro/kg)  Justification: Obesity guidelines  Estimated Fluid Needs: 3756-2634 mL/day (1 mL/kcal)   Justification: Maintenance or Per provider pending fluid status    PHYSICAL FINDINGS  See malnutrition section below.    MALNUTRITION  % Intake: </= 50% for >/= 5 days (severe)-did not eat for 7 days PTA  % Weight Loss: Up to 5% in 1 month (non-severe)-possibly d/t error  Subcutaneous Fat Loss: Unable to assess  Muscle Loss: Unable to assess  Fluid Accumulation/Edema: Unable to assess  Malnutrition Diagnosis: At least non-severe malnutrition in the context of acute illness however unable to fully assess d/t no NFPA completed    NUTRITION DIAGNOSIS  Inadequate oral intake related to depression as evidenced by pt refusing to eat X7 days  "     INTERVENTIONS  Implementation  Nutrition Education: Unable to complete, pt unavailable    Boost BID (B/D)  Reweigh pt      Goals  Patient to consume % of nutritionally adequate meal trays TID, or the equivalent with supplements/snacks.     Monitoring/Evaluation  Progress toward goals will be monitored and evaluated per protocol.    Yeimi Warren MS, RD, LDN  Unit Pager [phone]

## 2020-06-15 NOTE — PROGRESS NOTES
"Patient has been cooperative since admission.  She contracts for safety on the unit.  She states she hasn't eaten or drank for seven days - \"I did research and read people die within 7 to 10 days if they don't eat or drink.\"  Pls see labs done today.  Patient was med compliant.  "

## 2020-06-15 NOTE — PLAN OF CARE
"Admission:     Admitted voluntarily for increasing depression and suicidal behavior from [facility]. Pt has stopped eating for the past week, and began refusing medication. Pt is under commitment, and Travis Hoerd. Most recently at [facility] in May 2020. She states she did not find ECT helpful, describes it as \"horrible\", and states she has experienced short term memory loss. Reports one past suicide attempt in February of 2020 via overdose. States she has not had any other incidences of self harm in the past year, but says \"I used to cut myself when I was younger\". Voicing feelings of hopelessness, anhedonia, and depression. Poor sleep, increasingly isolating. Cites an argument with her  recently as a current stressor, but does not provide details. (See chart review for further details and hx)    Presents with flat affect, poor hygiene, disheveled. Speech is clear and coherent. Poor eye contact. Is calm, polite, and cooperative completing admission. Encouraged food and asked if she thinks she could eat something tonight. States she will eat some crackers. Oriented to room and unit. Encouraged to notify staff of needs, questions, and concerns. Pt verbalizes understanding.     "

## 2020-06-15 NOTE — PROGRESS NOTES
CTC called pt , Samreen Machuca, [phone]. Left message.     CTC called [facility], [phone]. Spoke with Jose Alberto who states pt has been not eating, staying in her room the entire stay Hasn't been participating in programming; refuses to do anything when asked. Even very simple things. Do not feel they can meet her needs; cannot take her back.     VM from [facility]reen. Persistent desire to die; doesn't eat. Ex- is talking about taking youngest son (13) to [place] and potentially the 16 year old son who lives with Misty's sister.     At [facility], pt did nothing. Wouldn't agree to outpatient ECT. Personality issues in addition to the ongoing depression.     Called Samreen back. States that [facility] worked hard with pt, gave her limited expectations such as come out of her room 1x/day to interact which pt was not able to do. Per family report, pt has been engaged in these traits since age 11. Very dependent on others, very passive, little motivation.      Pt is under commitment, [place] and Travis Rinconpherd. 4/6/2020; new order authorizing ECT. First Robles Andersen was 12/2019; that was done. New petition 4/2020 but ECT never given so order still in effect.     State that ECT was minimally effective; was 2x/week and pt would report feeling better right after ECT but then the effect wouldn't stick. Pt also wouldn't eat on the unit but drank 6-8 boosts per day.     Pt has not told her mom that she's been  since 2012; mom is aware that they don't live together. Minimal contact with mom who possibly has an alcohol issue.     Samreen states finally got a GEORGI for pt mom and is going to call her to get more information.     [facility] is an option; their IRTS option has peer support who can intervene with pt who might be more receptive.     Pt has no income, they are trying to get pt's SMRT to get into adult foster care. She lost her job at [employer] secondary to not showing up.    Also states that  pt is timoteo SALAS, not on [employer] as she lost her job at [employer]. CTC called financial office, left message asking for clarification for this.

## 2020-06-15 NOTE — PROGRESS NOTES
"   06/15/20 1400   Behavioral Health   Hallucinations denies / not responding to hallucinations   Thinking poor concentration   Orientation person: oriented;place: oriented;time: oriented   Memory baseline memory   Insight poor   Judgement impaired   Eye Contact at examiner   Affect blunted, flat;sad   Mood anxious;depressed   Physical Appearance/Attire untidy   Hygiene neglected grooming - unclean body, hair, teeth   Suicidality other (see comments);thoughts only   1. Wish to be Dead (Recent) Yes   2. Non-Specific Active Suicidal Thoughts (Recent) Yes   Self Injury thoughts only   Elopement   (none observed)   Activity isolative;withdrawn   Speech clear;coherent   Medication Sensitivity no stated side effects;no observed side effects   Psychomotor / Gait   (laying in bed all day)   Psycho Education   Type of Intervention 1:1 intervention   Response participates, initiates socially appropriate   Hours 0.5   Treatment Detail check-in   Activities of Daily Living   Hygiene/Grooming prompts   Oral Hygiene prompts   Dress scrubs (behavioral health)   Room Organization unable     Pt was isolative to her room all day except for breakfast, was observed sleeping most of the day. Pt did not attend groups and neglected most ADLs. During check-in pt endorsed feeling anxiety, rates it 8/10, and says the source is social anxiety due to \"being in a new place\", and says it makes her want to stay in bed. Pt endorsed depression at 6/10, and endorsed passing thoughts of SIB (strangulating self) and SI--pt contracted for safety with writer during check-in. Pt described having inadequate sleep (slept 10pm to somewhere between 1-4 am), and lack of appetite (did not eat lunch). Writer encouraged pt to try coming out for dinner and groups later tonight, and to express any needs to staff on unit.  "

## 2020-06-15 NOTE — PLAN OF CARE
BEHAVIORAL TEAM DISCUSSION    Participants: Hina Morris CNP; Sarah Evangelista and Alicia RUGGIERO's; Charleen Booth and Lety Ramirez, LANA; Herbert Psych Associate  Progress: New admission for this 34 year [facility] due to depression and SI, discontinuing eating and refusing medications. Pt is currently under commitment  Anticipated length of stay: TBD.  Of note, pt was discharged from her IRTS placement due to their request  Continued Stay Criteria/Rationale: needs further assessment  Medical/Physical: uneventful  Precautions:   Behavioral Orders   Procedures     Code 1 - Restrict to Unit     Routine Programming     As clinically indicated     Self Injury Precaution     Status 15     Every 15 minutes.     Suicide precautions     Patients on Suicide Precautions should have a Combination Diet ordered that includes a Diet selection(s) AND a Behavioral Tray selection for Safe Tray - with utensils, or Safe Tray - NO utensils       Plan: assess, monitor and stabilize  Rationale for change in precautions or plan: new admission

## 2020-06-15 NOTE — H&P
History and Physical    Misty Parham MRN# [id number]   Age: 34 year old YOB: 1985     Date of Admission:  6/14/2020          Contacts:     PCP - Monse Chen - [facility]    Psychiatry - Dr. Hernández - [facility]    Therapy - Anderson Moss, LPCC - [facility]  - Samreen Machuca ([phone])         Diagnoses:     Major depressive disorder, severe, with psychotic features  Generalized anxiety disorder  Borderline personality disorder         Recommendations:     Admit to Unit: 32N    Attending Physician: Dr. Laughlin, under the direct care of Hina Morris NP    Patient is voluntary.  She is under commitment with Travis Sr through [facility].  PD has not been revoked.     Routine lab studies have been requested.    Monitor for target symptoms.     Provide a safe environment and therapeutic milieu.     Medications:  Continue Lunesta 3 mg at HS.  Continue Rozerem 8 mg at HS.  Continue Benadryl 25-50 mg at HS PRN.  Continue Hydroxyzine 25-50 mg TID PRN.  Continue Geodon 60 mg BID with meals.  Change Seroquel to XR formulation, 50 mg at HS.  Continue Lamictal 200 mg daily.      She reports that she would like to discharge home rather than return to [facility] because she misses her children.  Coordinate disposition with outpatient .        Telemedicine Visit: The patient's condition can be safely assessed and treated via synchronous audio and visual telemedicine encounter.      Start Time: 0836  Stop Time: 0853    Reason for Telemedicine Visit: COVID-19 precautions    Originating Site (Patient Location): [facility] 32N    Distant Site (Provider Location): Provider Remote Setting    Consent:  The patient/guardian has verbally consented to: the potential risks and benefits of telemedicine (video visit) versus in person care; bill my insurance or make self-payment for services provided; and responsibility for payment of non-covered services.     Mode of  "Communication:  Video Conference via Skype    As the provider I attest to compliance with applicable laws and regulations related to telemedicine.     Attestation:  Patient has been seen and evaluated by me, WILLIAN Sánchez CNP  The patient was counseled on nature of illness and treatment plan/options  Care was coordinated with treatment team     Clinical Global Impressions  First:  Considering your total clinical experience with this particular patient population, how severe are the patient's symptoms at this time?: 7 (06/15/20 0520)  Compared to the patient's condition at the START of treatment, this patient's condition is: 4 (06/15/20 0520)  Most recent:  Considering your total clinical experience with this particular patient population, how severe are the patient's symptoms at this time?: 7 (06/15/20 0520)  Compared to the patient's condition at the START of treatment, this patient's condition is: 4 (06/15/20 0520)           Chief Complaint:     History is obtained from the patient and electronic health record.    \"Suicidal ideation with a plan and I haven't eaten in 7 days.\"           History of Present Illness:        Misty Parham is a 34-year-old female admitted to [address] on 6/14/2020.  She was admitted as a voluntary patient through [facility] ER due to depressive symptoms and suicidal ideation with a plan to hang herself.  She is currently under commitment with Travis Sr in place.  She was hospitalized at [facility] in February - April 2020 at which time she underwent a course of 14 ECT treatments and was also hospitalized at [facility] in early May 2020.  She discharged to [facility] on May 8th.  She had been withdrawn, not attending groups at the [facility].  She made comments to [facility] staff about her intent to hang herself.  She reports conflict with her .  They have been  for about 6 years.  She also misses " "her children.  She saw the youngest about 2 weeks ago and hasn't seen the older children in a couple months.  She reports that she didn't eat for 7 days preceding admission because \"I wanted to starve myself.\"  Since admission she has consumed some crackers.  She reports that she wasn't taking her medications regularly.  \"I take them for a couple days, then stop for a couple days because I feel like I don't deserve to be happy.\"  She denies side effects from meds and states they are helpful when she takes them regularly.  UTOX was negative.          Psychiatric Review of Systems:      -- Depressive episode: mood is depressed, characterized as severe, accompanied with low energy, low appetite, anhedonia, feelings of hopelessness, helplessness, worthlessness and guilt, endorses suicidal ideation and contracts for safety on the unit, some impairment in concentration, endorses middle insomnia  -- Trixie:  denies symptoms  -- Psychosis:  denies symptoms  -- Anxiety: endorses anxiety, moderate intensity, racing thoughts, irritability, social anxiety, denies muscle tension, denies panic attacks  -- PTSD: denies symptoms  -- OCD: denies  symptoms  -- Eating disorder: denies symptoms  Denies homicidal ideation         Medical Review of Systems:     A 10-point review of systems was completed and is negative with the exception of HPI.            Psychiatric History:     The patient has an established diagnosis of major depressive disorder and suspected borderline personality disorder.  She has a history of self-injurious behavior through superficial scratching during moments of emotional dysregulation.  She has a history of one suicide attempt by overdose.  She denies any history of aggressive behaviors.  She has been hospitalized numerous times in the past, both through [facility] as well as [facility].  She is currently under commitment for treatment of mental illness with a " Travis rodrigez.  Her last sessions of ECT occurred in April 2020 at [facility].  She says that it wasn't beneficial and caused memory impairment.  No history of TMS.  She has tried numerous medication, some of which have included Zoloft, Prozac, Lexapro, Paxil, Effexor, Pristiq, Remeron, Wellbutrin, Trintellix, lithium (caused tremor), Lamictal, Rexulti, protriptyline and nortriptyline.  She has been in DBT twice.             Substance Use History:     She denies using illicit substances or alcohol corresponding to a diagnosis of abuse or dependence.  No prior chemical dependency treatments reported.          Past Medical History:     No history of major medical issues, seizures or head injuries.           Past Surgical History:     Cholecystecomy         Allergies:     No known allergies           Medications:       acetaminophen (TYLENOL) 325 MG tablet Take 650 mg by mouth every 4 hours as needed for mild pain     alum & mag hydroxide-simethicone (MAALOX) 200-200-20 MG/5ML SUSP suspension Take 20 mLs by mouth 4 times daily as needed for indigestion     calcium carbonate (TUMS) 500 MG chewable tablet Take 2 chew tab by mouth every 2 hours as needed for heartburn     diphenhydrAMINE (BENADRYL) 25 MG tablet Take 25-50 mg by mouth nightly as needed for sleep     eszopiclone (LUNESTA) 3 MG tablet Take 1 tablet (3 mg) by mouth At Bedtime     guaiFENesin (ROBITUSSIN) 100 MG/5ML SYRP Take 10 mLs by mouth every 4 hours as needed for cough     hydrocortisone (CORTAID) 1 % external cream Apply topically 2 times daily as needed     hydrOXYzine (ATARAX) 25 MG tablet Take 1-2 tablets (25-50 mg) by mouth 3 times daily as needed for anxiety     lamoTRIgine (LAMICTAL) 200 MG tablet Take 1 tablet (200 mg) by mouth daily     loperamide (IMODIUM) 2 MG capsule Take 2 mg by mouth 4 times daily as needed for diarrhea     magnesium hydroxide (MILK OF MAGNESIA) 400 MG/5ML suspension Take 30 mLs by mouth daily as needed for  constipation or heartburn     neomycin-bacitracin-polymyxin (NEOSPORIN) 5-400-5000 ointment Apply topically 2 times daily as needed (abrasions)     phenylephrine HCl 10 MG TABS Take 10 mg by mouth every 4 hours as needed      protriptyline (VIVACTIL) 10 MG tablet Take 1 tablet (10 mg) by mouth daily (with dinner)     QUEtiapine (SEROQUEL) 50 MG tablet Take 50 mg by mouth At Bedtime     ramelteon (ROZEREM) 8 MG tablet Take 1 tablet (8 mg) by mouth At Bedtime     rOPINIRole (REQUIP) 1 MG tablet Take 1 tablet (1 mg) by mouth At Bedtime     ziprasidone (GEODON) 60 MG capsule Take 60 mg by mouth 2 times daily (with meals)             Social History:     She grew up in [place].  She was raised by her mother.  She has 3 siblings.  She denies any history of childhood abuse.  She graduated from high school.  She is  but  from her  for about 6 years.  She is hoping to reconcile eventually.  She had been living with her mother prior to [facility].  She has 1 stepdaughter age 22 and 2 sons ages 17 and 13.  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  No history of legal issues.            Family History:     No family history of suicides.  Her mother had depression and alcohol use disorder.  Her biological father possibly had mental illness and alcohol use disorder.  Her maternal aunt has depression and chemical dependency.  Her oldest son has mental illness.           Labs:      Ref. Range 6/14/2020 00:00 6/14/2020 11:42 6/14/2020 15:04   Sodium Latest Ref Range: 133 - 144 mmol/L 137     Potassium Latest Ref Range: 3.4 - 5.3 mmol/L 3.7     Chloride Latest Ref Range: 94 - 109 mmol/L 105     Carbon Dioxide Latest Ref Range: 20 - 32 mmol/L 27     Urea Nitrogen Latest Ref Range: 7 - 30 mg/dL 8     Creatinine Latest Ref Range: 0.52 - 1.04 mg/dL 1.04     GFR Estimate Latest Ref Range: >60 mL/min/1.73_m2 69     GFR Estimate If Black Latest Ref Range: >60 mL/min/1.73_m2  80     Calcium Latest Ref Range: 8.5 - 10.1 mg/dL 9.3     Anion Gap Latest Ref Range: 3 - 14 mmol/L 5     Glucose Latest Ref Range: 70 - 99 mg/dL 103 (H)     WBC Latest Ref Range: 4.0 - 11.0 10e9/L 7.4     Hemoglobin Latest Ref Range: 11.7 - 15.7 g/dL 14.7     Hematocrit Latest Ref Range: 35.0 - 47.0 % 44.7     Platelet Count Latest Ref Range: 150 - 450 10e9/L 311     RBC Count Latest Ref Range: 3.8 - 5.2 10e12/L 4.91     MCV Latest Ref Range: 78 - 100 fl 91     MCH Latest Ref Range: 26.5 - 33.0 pg 29.9     MCHC Latest Ref Range: 31.5 - 36.5 g/dL 32.9     RDW Latest Ref Range: 10.0 - 15.0 % 13.1     Diff Method Unknown Automated Method     % Neutrophils Latest Units: % 72.9     % Lymphocytes Latest Units: % 18.4     % Monocytes Latest Units: % 6.6     % Eosinophils Latest Units: % 1.4     % Basophils Latest Units: % 0.4     % Immature Granulocytes Latest Units: % 0.3     Nucleated RBCs Latest Ref Range: 0 /100 0     Absolute Neutrophil Latest Ref Range: 1.6 - 8.3 10e9/L 5.4     Absolute Lymphocytes Latest Ref Range: 0.8 - 5.3 10e9/L 1.4     Absolute Monocytes Latest Ref Range: 0.0 - 1.3 10e9/L 0.5     Absolute Eosinophils Latest Ref Range: 0.0 - 0.7 10e9/L 0.1     Absolute Basophils Latest Ref Range: 0.0 - 0.2 10e9/L 0.0     Abs Immature Granulocytes Latest Ref Range: 0 - 0.4 10e9/L 0.0     Absolute Nucleated RBC Unknown 0.0     COVID-19 Virus PCR to [facility] - Source Unknown  Nasopharyngeal    COVID-19 Virus PCR to [facility] - Result Unknown  Test received-See reflex to IDDL test SARS CoV2 (COVID-19) Virus RT-PCR    SARS-CoV-2 Virus Specimen Source Unknown  Nasopharyngeal    SARS-CoV-2 PCR Result Unknown  NEGATIVE    Amphetamine Qual Urine Latest Ref Range: NEG^Negative    Negative   Cocaine Qual Urine Latest Ref Range: NEG^Negative    Negative   Opiates Qualitative Urine Latest Ref Range: NEG^Negative    Negative   Cannabinoids Qual Urine Latest Ref Range: NEG^Negative    Negative   Barbiturates Qual Urine Latest Ref  "Range: NEG^Negative    Negative   Pcp Qual Urine Latest Ref Range: NEG^Negative    Negative   Benzodiazepine Qual Urine Latest Ref Range: NEG^Negative    Negative            Psychiatric Examination:     Appearance:  Alert, adequate grooming  Attitude: Cooperative however mostly passive  Eye Contact: Fair  Mood:  Depressed  Affect:  Mood congruent and blunted  Speech:  Minimal in content, low volume  Psychomotor Behavior:  No psychomotor abnormalities noted  Thought Process: Linear and logical, not tangential or circumstantial or disorganized  Associations:  Logical; no loose associations Noted  Thought Content:  No obvious paranoia, delusions, ideas of reference, or grandiosity noted, denies auditory or visual hallucinations, endorsed suicidal Ideations and contracts for safety on the unit, denies homicidal ideations  Insight:  Fair  Judgment:  Fair  Oriented to:  Date, time, person, and place  Attention Span and Concentration:  Intact  Recent and Remote Memory:  Intact based on interviewing and details provided  Language:  Appropriate based on interviewing  Fund of Knowledge:  Appropriate based on interviewing  Muscle Strength and Tone:  Normal upon visual inspection  Gait and Station:  Normal upon visual inspection            /81   Pulse 114   Temp 98.4  F (36.9  C) (Oral)   Resp 16   Ht 1.651 m (5' 5\")   Wt 95.7 kg (211 lb)   LMP 06/03/2020   BMI 35.11 kg/m             Physical Exam:     Please refer to the physical exam completed by Dr. Reardon at [facility] on 6/13/2020.  "

## 2020-06-16 PROCEDURE — 99232 SBSQ HOSP IP/OBS MODERATE 35: CPT | Performed by: NURSE PRACTITIONER

## 2020-06-16 PROCEDURE — 36415 COLL VENOUS BLD VENIPUNCTURE: CPT | Performed by: NURSE PRACTITIONER

## 2020-06-16 PROCEDURE — 25000132 ZZH RX MED GY IP 250 OP 250 PS 637: Performed by: NURSE PRACTITIONER

## 2020-06-16 PROCEDURE — 80175 DRUG SCREEN QUAN LAMOTRIGINE: CPT | Performed by: NURSE PRACTITIONER

## 2020-06-16 PROCEDURE — 12400001 ZZH R&B MH UMMC

## 2020-06-16 RX ADMIN — QUETIAPINE FUMARATE 50 MG: 50 TABLET, EXTENDED RELEASE ORAL at 21:10

## 2020-06-16 RX ADMIN — LAMOTRIGINE 200 MG: 200 TABLET ORAL at 09:10

## 2020-06-16 RX ADMIN — ZIPRASIDONE HCL 60 MG: 60 CAPSULE ORAL at 18:32

## 2020-06-16 RX ADMIN — RAMELTEON 8 MG: 8 TABLET ORAL at 21:10

## 2020-06-16 RX ADMIN — ESZOPICLONE 3 MG: 3 TABLET, FILM COATED ORAL at 21:11

## 2020-06-16 RX ADMIN — PROTRIPTYLINE HYDROCHLORIDE 10 MG: 10 TABLET ORAL at 18:32

## 2020-06-16 RX ADMIN — ROPINIROLE HYDROCHLORIDE 1 MG: 1 TABLET, FILM COATED ORAL at 21:10

## 2020-06-16 RX ADMIN — ZIPRASIDONE HCL 60 MG: 60 CAPSULE ORAL at 09:10

## 2020-06-16 ASSESSMENT — ACTIVITIES OF DAILY LIVING (ADL)
HYGIENE/GROOMING: INDEPENDENT
DRESS: SCRUBS (BEHAVIORAL HEALTH)
HYGIENE/GROOMING: PROMPTS
LAUNDRY: WITH SUPERVISION
DRESS: SCRUBS (BEHAVIORAL HEALTH)
ORAL_HYGIENE: INDEPENDENT
ORAL_HYGIENE: INDEPENDENT

## 2020-06-16 ASSESSMENT — MIFFLIN-ST. JEOR: SCORE: 1666.11

## 2020-06-16 NOTE — PROGRESS NOTES
06/15/20 2041   Behavioral Health   Hallucinations denies / not responding to hallucinations   Thinking poor concentration   Orientation person: oriented;place: oriented;time: oriented;date: oriented   Memory baseline memory   Insight poor   Judgement impaired   Eye Contact at examiner   Affect blunted, flat   Mood depressed;anxious   Physical Appearance/Attire untidy   Hygiene neglected grooming - unclean body, hair, teeth   Suicidality   (denies)   1. Wish to be Dead (Recent) No   2. Non-Specific Active Suicidal Thoughts (Recent) No   3. Active Sucidal Ideation with any Methods (Not Plan) Without Intent to Act (Recent) No   4. Active Suicidal Ideation with Some Intent to Act, Without Specific Plan (Recent) No   5. Active Suicidal Ideation with Specific Plan and Intent (Recent) No   Duration (Lifetime) NA   Change in Protective Factors? No   Enviromental Risk Factors None   Self Injury   (denies)   Elopement   (none stated or observed)   Activity isolative;withdrawn   Speech clear;coherent   Medication Sensitivity no observed side effects;no stated side effects   Psychomotor / Gait balanced;steady   Daily Care   Activity up ad priya   Patient Performed Hygiene dressed   Activities of Daily Living   Hygiene/Grooming prompts   Oral Hygiene prompts   Dress scrubs (behavioral health)   Laundry with supervision   Room Organization prompts     Pt states anxiety is a 7 and depression is a 5. Pt states to cope with these feelings she thinks of her children. Pt denies SI, SIB, hallucinations, SEs. Pt states she has social anxiety about going to groups. Pt states her appetite has been low and that she hasn't really eaten in a week until today. Pt states her sleep is poor- going to bed at 10 pm and waking up between 1 and 4 am. Pt states her sleeping meds have been helping her fall asleep faster. Pt has been in bed most of shift.

## 2020-06-16 NOTE — PROGRESS NOTES
Work Completed:  CTC reviewed chart and remotely attended team discussion.      Discharge plan or goal: once stable, consideration for referral to [facility].                Barriers to discharge: pt's mental health

## 2020-06-16 NOTE — PLAN OF CARE
Patient denied suicidal ideation.  She was out of her room to make phone calls and spent a brief time in the lounge.  Patient denied suicidal ideation, SIB urges, thought problems, physical problems, and medication side effects.  She had crackers, fruit and water this evening.  Patient noted to smile briefly.

## 2020-06-16 NOTE — PLAN OF CARE
RN 48 hour assessment:  Patient said she did not have any suicidal thoughts today. She rated her depression and anxiety both a 7/10. Patient isolated for most of the shift. She didn't attend any groups. Patient took a shower this afternoon. Patient did not ask for any prn medications. She was compliant with scheduled ones. Patient will have a Lamictal level drawn this evening.

## 2020-06-16 NOTE — PROGRESS NOTES
"[facility]   Psychiatric Progress Note      Impression:     Misty Parham is a 34-year-old female admitted to [facility]N on 6/14/2020.  She was admitted as a voluntary patient through [facility] ER due to depressive symptoms and suicidal ideation with a plan to hang herself.  She is currently under commitment with Robles Sr in place.  She was hospitalized at [facility] in February - April 2020 at which time she underwent a course of 14 ECT treatments and was also hospitalized at [facility] in early May 2020.  She discharged to [facility] on May 8th.  She had been withdrawn, not attending groups at the [facility].  She made comments to [facility] staff about her intent to hang herself.  She reports conflict with her  and says they have been  for about 6 years, however her  reports she is .  She also misses her children.  She saw the youngest about 2 weeks ago and hasn't seen the older children in a couple months.  She reports that she didn't eat for 7 days preceding admission because \"I wanted to starve myself.\"  Since admission she has consumed some crackers.  She reports that she wasn't taking her medications regularly.  \"I take them for a couple days, then stop for a couple days because I feel like I don't deserve to be happy.\"  She denies side effects from meds and states they are helpful when she takes them regularly.  UTOX was negative.  Since admission, Rozerem, Lunesta, Geodon and Lamictal were continued.  Seroquel was changed to XR formulation.  PRN Hydroxyzine and PRN Benadryl were continued.  Mood is depressed.  She has been isolative.  She endorses suicidal ideation with a plan to strangle herself and contracts for safety on the unit.         Diagnoses:     Major depressive disorder, severe, with psychotic features  Generalized anxiety disorder  Borderline personality " disorder         Plan:     Medications:  Continue Lunesta 3 mg at HS.  Continue Rozerem 8 mg at HS.  Continue Benadryl 25-50 mg at HS PRN.  Continue Hydroxyzine 25-50 mg TID PRN.  Continue Geodon 60 mg BID with meals.  Change Seroquel to XR formulation, 50 mg at HS.  Continue Lamictal 200 mg daily.       Lamotrigine level ordered.      She is under commitment with Travis Sr.  She has been discharged from [facility].  Outpatient  is recommending referral to [facility].         Telemedicine Visit: The patient's condition can be safely assessed and treated via synchronous audio and visual telemedicine encounter.       Start Time: 0850  Stop Time: 0859     Reason for Telemedicine Visit: COVID-19 precautions     Originating Site (Patient Location): [facility] 32     Distant Site (Provider Location): Provider Remote Setting     Consent:  The patient/guardian has verbally consented to: the potential risks and benefits of telemedicine (video visit) versus in person care; bill my insurance or make self-payment for services provided; and responsibility for payment of non-covered services.      Mode of Communication:  Video Conference via Skype     As the provider I attest to compliance with applicable laws and regulations related to telemedicine.      Attestation:  Patient has been seen and evaluated by me, WILLIAN Sánchez CNP  The patient was counseled on nature of illness and treatment plan/options  Care was coordinated with treatment team      Clinical Global Impressions  First:  Considering your total clinical experience with this particular patient population, how severe are the patient's symptoms at this time?: 7 (06/15/20 0520)  Compared to the patient's condition at the START of treatment, this patient's condition is: 4 (06/15/20 0520)  Most recent:  Considering your total clinical experience with this particular patient population, how severe are the patient's  symptoms at this time?: 7 (06/15/20 0520)  Compared to the patient's condition at the START of treatment, this patient's condition is: 4 (06/15/20 0520)          Interim History:     The patient's care was discussed with the treatment team and chart notes were reviewed.  Pt was documented as sleeping 7 hours during the overnight shift.  She has not been attending groups.  She has been spending much of her time in bed.  States that social anxiety prevents her from attending groups.  Reports difficulty falling and staying asleep.  States she has had chronic suicidal ideation for the past 2 years.  She reports a plan to strangle herself and contracts for safety on the unit.  States she ate Tu[place]uan toast, a fruit plate and consumed 1 Boost yesterday.  Agrees to a lamotrigine level to ascertain whether it would be beneficial to increase her Lamictal.  Pt inquired as to when she will be returning to [facility] and was informed that she was discharged from the [facility], and that her outpatient  is investigating other options.           Medications:     Current Facility-Administered Medications   Medication     acetaminophen (TYLENOL) tablet 650 mg     alum & mag hydroxide-simethicone (MAALOX  ES) suspension 30 mL     calcium carbonate (TUMS) chewable tablet 1,000 mg     diphenhydrAMINE (BENADRYL) tablet 25-50 mg     eszopiclone (LUNESTA) tablet 3 mg     guaiFENesin (ROBITUSSIN) 20 mg/mL solution 10 mL     hydrocortisone (CORTAID) 1 % cream     hydrOXYzine (ATARAX) tablet 25-50 mg     lamoTRIgine (LaMICtal) tablet 200 mg     loperamide (IMODIUM) capsule 2 mg     magnesium hydroxide (MILK OF MAGNESIA) suspension 30 mL     neomycin-bacitracin-polymyxin (NEOSPORIN) ointment     OLANZapine (zyPREXA) tablet 10 mg    Or     OLANZapine (zyPREXA) injection 10 mg     protriptyline (VIVACTIL) tablet 10 mg     QUEtiapine (SEROquel XR) 24 hr tablet 50 mg     ramelteon (ROZEREM) tablet 8 mg     rOPINIRole (REQUIP) tablet 1 mg      "traZODone (DESYREL) tablet 50 mg     ziprasidone (GEODON) capsule 60 mg             Allergies:   No Known Allergies         Psychiatric Examination:     Appearance:  Alert, adequate grooming  Attitude: Cooperative however mostly passive  Eye Contact: Fair  Mood:  Depressed  Affect:  Mood congruent and blunted  Speech:  Minimal in content, low volume  Psychomotor Behavior:  No psychomotor abnormalities noted  Thought Process: Linear and logical, not tangential or circumstantial or disorganized  Associations:  Logical; no loose associations Noted  Thought Content:  No obvious paranoia, delusions, ideas of reference, or grandiosity noted, denies auditory or visual hallucinations, endorsed suicidal Ideations and contracts for safety on the unit, denies homicidal ideations  Insight:  Fair  Judgment:  Fair  Oriented to:  Date, time, person, and place  Attention Span and Concentration:  Intact  Recent and Remote Memory:  Intact based on interviewing and details provided  Language:  Appropriate based on interviewing  Fund of Knowledge:  Appropriate based on interviewing  Muscle Strength and Tone:  Normal upon visual inspection  Gait and Station:  Normal upon visual inspection           /76 (BP Location: Right arm)   Pulse 92   Temp 97.6  F (36.4  C) (Oral)   Resp 16   Ht 1.651 m (5' 5\")   Wt 97.5 kg (215 lb)   LMP 06/03/2020   SpO2 99%   BMI 35.78 kg/m           Labs:     No results found for this or any previous visit (from the past 24 hour(s)).  "

## 2020-06-17 PROCEDURE — 12400001 ZZH R&B MH UMMC

## 2020-06-17 PROCEDURE — 25000132 ZZH RX MED GY IP 250 OP 250 PS 637: Performed by: NURSE PRACTITIONER

## 2020-06-17 PROCEDURE — 99232 SBSQ HOSP IP/OBS MODERATE 35: CPT | Performed by: NURSE PRACTITIONER

## 2020-06-17 RX ORDER — LAMOTRIGINE 150 MG/1
300 TABLET ORAL DAILY
Status: DISCONTINUED | OUTPATIENT
Start: 2020-06-18 | End: 2020-06-23 | Stop reason: HOSPADM

## 2020-06-17 RX ADMIN — QUETIAPINE FUMARATE 50 MG: 50 TABLET, EXTENDED RELEASE ORAL at 20:49

## 2020-06-17 RX ADMIN — RAMELTEON 8 MG: 8 TABLET ORAL at 20:50

## 2020-06-17 RX ADMIN — PROTRIPTYLINE HYDROCHLORIDE 10 MG: 10 TABLET ORAL at 18:29

## 2020-06-17 RX ADMIN — ROPINIROLE HYDROCHLORIDE 1 MG: 1 TABLET, FILM COATED ORAL at 20:49

## 2020-06-17 RX ADMIN — LAMOTRIGINE 200 MG: 200 TABLET ORAL at 09:01

## 2020-06-17 RX ADMIN — ZIPRASIDONE HCL 60 MG: 60 CAPSULE ORAL at 09:01

## 2020-06-17 RX ADMIN — ESZOPICLONE 3 MG: 3 TABLET, FILM COATED ORAL at 20:50

## 2020-06-17 RX ADMIN — ZIPRASIDONE HCL 60 MG: 60 CAPSULE ORAL at 18:29

## 2020-06-17 ASSESSMENT — ACTIVITIES OF DAILY LIVING (ADL)
DRESS: SCRUBS (BEHAVIORAL HEALTH)
HYGIENE/GROOMING: INDEPENDENT
LAUNDRY: WITH SUPERVISION
ORAL_HYGIENE: INDEPENDENT

## 2020-06-17 NOTE — PROGRESS NOTES
Patient's standing pulse was elevated this morning. Patient said was lightheaded this morning. Water encouraged. This afternoon patient said did not drink water. Writer brought patient a large cup of water which patient said she was willing to drink.

## 2020-06-17 NOTE — PROGRESS NOTES
"[facility]   Psychiatric Progress Note      Impression:     Misty Parham is a 34-year-old female admitted to [facility] on 6/14/2020.  She was admitted as a voluntary patient through [facility] ER due to depressive symptoms and suicidal ideation with a plan to hang herself.  She is currently under commitment with Robles Sr in place.  She was hospitalized at [facility] in February - April 2020 at which time she underwent a course of 14 ECT treatments and was also hospitalized at [facility] in early May 2020.  She discharged to [facility] on May 8th.  She had been withdrawn, not attending groups at the [facility].  She made comments to [facility] staff about her intent to hang herself.  She reports conflict with her  and says they have been  for about 6 years, however her  reports she is , which patient later verified.  She also misses her children.  She saw the youngest about 2 weeks prior to admission and hasn't seen the older children in a couple months.  She reports that she didn't eat for 7 days preceding admission because \"I wanted to starve myself.\"  Since admission she has consumed some crackers.  She reports that she wasn't taking her medications regularly.  \"I take them for a couple days, then stop for a couple days because I feel like I don't deserve to be happy.\"  She denies side effects from meds and states they are helpful when she takes them regularly.  UTOX was negative.  Since admission, Rozerem, Lunesta, and Geodon were continued.  Lamictal was increased.  Seroquel was changed to XR formulation.  PRN Hydroxyzine and PRN Benadryl were continued.  Mood is slightly improved.  She endorses occasional passive suicidal ideation.          Diagnoses:     Major depressive disorder, severe, with psychotic features  Generalized anxiety disorder  Borderline personality disorder      "    Plan:     Medications:  Continue Lunesta 3 mg at HS.  Continue Rozerem 8 mg at HS.  Continue Benadryl 25-50 mg at HS PRN.  Continue Hydroxyzine 25-50 mg TID PRN.  Continue Geodon 60 mg BID with meals.  Continue Seroquel XR 50 mg at HS.  Increase Lamictal to 300 mg daily.    She is under commitment with Travis Sr.  PD has not been revoked.  She has been discharged from [facility].  Outpatient  is recommending referral to [facility].  She would prefer discharge to her mother's home.        Telemedicine Visit: The patient's condition can be safely assessed and treated via synchronous audio and visual telemedicine encounter.       Start Time: 1131  Stop Time: 1140     Reason for Telemedicine Visit: COVID-19 precautions     Originating Site (Patient Location): [facility]     Distant Site (Provider Location): Provider Remote Setting     Consent:  The patient/guardian has verbally consented to: the potential risks and benefits of telemedicine (video visit) versus in person care; bill my insurance or make self-payment for services provided; and responsibility for payment of non-covered services.      Mode of Communication:  Video Conference via Skype     As the provider I attest to compliance with applicable laws and regulations related to telemedicine.      Attestation:  Patient has been seen and evaluated by me, WILLIAN Sánchez CNP  The patient was counseled on nature of illness and treatment plan/options  Care was coordinated with treatment team      Clinical Global Impressions  First:  Considering your total clinical experience with this particular patient population, how severe are the patient's symptoms at this time?: 7 (06/15/20 0520)  Compared to the patient's condition at the START of treatment, this patient's condition is: 4 (06/15/20 0520)  Most recent:  Considering your total clinical experience with this particular patient population, how severe are the  "patient's symptoms at this time?: 7 (06/15/20 0520)  Compared to the patient's condition at the START of treatment, this patient's condition is: 4 (06/15/20 0520)          Interim History:     The patient's care was discussed with the treatment team and chart notes were reviewed.  Pt was documented as sleeping 7.5 hours during the overnight shift and reports sleeping better.  Noted that she did not nap yesterday, which likely contributed to improved sleep.  She has not been attending groups which she attributes to social anxiety.  States she has been spending some time in the milieu watching TV.  She says her mood is \"more positive, thinking about my kids.\"  She left some messages on their voicemails.  Pt states she is \"pretty close to reconciling\" with her ex-, though this is unlikely to be true as he has been planning a possible move to [facility].  She reports occasional passive suicidal ideation without intent/plan.  Anxiety is \"a little high.\"  Appetite is \"okay.\"  States she wants to live with her mother and that her mother is in agreement with this.  Discussed that her outpatient  may be referring her to [facility].           Medications:     Current Facility-Administered Medications   Medication     acetaminophen (TYLENOL) tablet 650 mg     alum & mag hydroxide-simethicone (MAALOX  ES) suspension 30 mL     calcium carbonate (TUMS) chewable tablet 1,000 mg     diphenhydrAMINE (BENADRYL) tablet 25-50 mg     eszopiclone (LUNESTA) tablet 3 mg     guaiFENesin (ROBITUSSIN) 20 mg/mL solution 10 mL     hydrocortisone (CORTAID) 1 % cream     hydrOXYzine (ATARAX) tablet 25-50 mg     [START ON 6/18/2020] lamoTRIgine (LaMICtal) tablet 300 mg     loperamide (IMODIUM) capsule 2 mg     magnesium hydroxide (MILK OF MAGNESIA) suspension 30 mL     neomycin-bacitracin-polymyxin (NEOSPORIN) ointment     OLANZapine (zyPREXA) tablet 10 mg    Or     OLANZapine (zyPREXA) injection 10 mg     protriptyline " "(VIVACTIL) tablet 10 mg     QUEtiapine (SEROquel XR) 24 hr tablet 50 mg     ramelteon (ROZEREM) tablet 8 mg     rOPINIRole (REQUIP) tablet 1 mg     traZODone (DESYREL) tablet 50 mg     ziprasidone (GEODON) capsule 60 mg             Allergies:   No Known Allergies         Psychiatric Examination:     Appearance:  Alert, adequate grooming  Attitude: Cooperative however mostly passive  Eye Contact: Good  Mood:  \"More positive\"  Affect:  Mood congruent and blunted  Speech:  Minimal in content, low volume  Psychomotor Behavior:  No psychomotor abnormalities noted  Thought Process: Linear, not entirely logical, not tangential or circumstantial or disorganized  Associations:  No loose associations  Thought Content:  No obvious paranoia, delusions, ideas of reference, or grandiosity noted, denies auditory or visual hallucinations, endorsed occasional passive suicidal Ideations and contracts for safety on the unit, denies homicidal ideations  Insight:  Fair  Judgment:  Fair  Oriented to:  Date, time, person, and place  Attention Span and Concentration:  Intact  Recent and Remote Memory:  Intact based on interviewing and details provided  Language:  Appropriate based on interviewing  Fund of Knowledge:  Appropriate based on interviewing  Muscle Strength and Tone:  Normal upon visual inspection  Gait and Station:  Normal upon visual inspection           /76 (BP Location: Right arm)   Pulse 106   Temp 93.7  F (34.3  C) (Oral)   Resp 14   Ht 1.651 m (5' 5\")   Wt 97.5 kg (215 lb)   LMP 06/03/2020   SpO2 98%   BMI 35.78 kg/m           Labs:     No results found for this or any previous visit (from the past 24 hour(s)).  "

## 2020-06-17 NOTE — PROGRESS NOTES
[facility] called pt , Samreen, [phone].    Discussed pt request to discharge by Friday. Discussed situation.     Samreen states will contact pt mom to discuss situation and whether pt can actually return there.     Pt signed GEORGI to [facility] sent referral.   [facility]  [address] [place] [address] [phone]   [email or web address]

## 2020-06-17 NOTE — PROGRESS NOTES
06/16/20 2200   Behavioral Health   Hallucinations denies / not responding to hallucinations   Thinking poor concentration   Orientation person: oriented;place: oriented   Memory baseline memory   Insight poor   Judgement impaired   Affect blunted, flat   Mood depressed;anxious   Physical Appearance/Attire untidy   Hygiene neglected grooming - unclean body, hair, teeth   Suicidality other (see comments)  (denies)   1. Wish to be Dead (Recent) No   2. Non-Specific Active Suicidal Thoughts (Recent) No   Self Injury   (denies)   Elopement   (None Observed)   Activity isolative   Speech clear;coherent   Psychomotor / Gait balanced;steady   Psycho Education   Type of Intervention 1:1 intervention   Response participates with encouragement   Hours 0.5   Treatment Detail Check in   Activities of Daily Living   Hygiene/Grooming independent   Oral Hygiene independent   Dress scrubs (behavioral health)   Room Organization independent     Pt was calm and cooperative and pleasant upon approach. Pt was visible in the milieu, but did not interact with peers. Pt denies SI and SIB. Pt rated anxiety at 6 and depression at 8. Pt stated she was feeling sad because she misses her kids. Pt hopes to discharge by Friday. No present concerns.

## 2020-06-17 NOTE — PROGRESS NOTES
"   06/17/20 9982   Behavioral Health   Hallucinations denies / not responding to hallucinations   Thinking distractable;poor concentration   Orientation person: oriented;place: oriented;date: oriented;time: oriented   Memory baseline memory   Insight poor   Judgement impaired   Eye Contact at examiner   Affect blunted, flat   Mood depressed;anxious   Physical Appearance/Attire attire appropriate to age and situation   Hygiene well groomed   Suicidality other (see comments)  (Patient denies)   1. Wish to be Dead (Recent) No   2. Non-Specific Active Suicidal Thoughts (Recent) No   Self Injury other (see comment)  (Patient denies)   Elopement   (None observed)   Activity isolative;withdrawn   Speech clear;coherent   Psychomotor / Gait balanced;steady   Activities of Daily Living   Hygiene/Grooming independent   Oral Hygiene independent   Dress scrubs (behavioral health)   Laundry with supervision   Room Organization independent   Patient ate breakfast, lunch, and spent most of the shift sleeping in her room. Patient reported depression and anxiety. Patient reported her depression is at \"5\" and anxiety is at \"5\". Patient reported feeling \"down\". Patient reported \"no\" pain, appetite is \"ok\", and sleep \"it's pretty good last night\". Patient denied any suicidal thoughts or thoughts to self injury. Patient reported having no goal for the day.   "

## 2020-06-17 NOTE — PROGRESS NOTES
"CLINICAL NUTRITION SERVICES - REASSESSMENT NOTE     Nutrition Prescription    RECOMMENDATIONS FOR MDs/PROVIDERS TO ORDER:  None today    Malnutrition Status:    At least non-severe malnutrition in the context of acute illness however unable to fully assess d/t no NFPA completed    Recommendations already ordered by Registered Dietitian (RD):  Vanilla boost q day  Diet education    Future/Additional Recommendations:  Monitor intakes of meals and supplements  Monitor wt for trend  Adjust supplements as needed pending intake of meals      EVALUATION OF THE PROGRESS TOWARD GOALS   Diet: Regular  Supplements: boost BID  Intake: Improving, eating ~50% of meals per pt report. Has been drinking boost       NEW FINDINGS   Pt reports appetite is \"a little better\". Hasn't been hungry for some time but is trying to eat. Stated she has been eating ~50% of meals since admit. Has been drinking boost, prefers vanilla. Reports UBW of 228#. Stated she lost some wt but now believes she is regaining. Recommend reducing boost to q day (vanilla) d/t improving appetite.   13# (5.7%) wt loss in 1 month  Wt Readings from Last 10 Encounters:   06/16/20 97.5 kg (215 lb)   06/14/20 95.7 kg (211 lb)   06/13/20 103.4 kg (228 lb)   05/07/20 103.8 kg (228 lb 14.4 oz)   04/21/20 103.3 kg (227 lb 11.2 oz)   02/18/20 105.2 kg (232 lb)   02/17/20 103 kg (227 lb)   01/14/20 103.2 kg (227 lb 9.6 oz)   10/22/19 104.8 kg (231 lb)   09/17/19 100.3 kg (221 lb 1.6 oz)   07/02/19 104.9 kg (231 lb 4.8 oz)     MALNUTRITION  % Intake: < 75% for > 7 days (non-severe)  % Weight Loss: > 5% in 1 month (severe)  Subcutaneous Fat Loss: Unable to assess  Muscle Loss: Unable to assess  Fluid Accumulation/Edema: None noted  Malnutrition Diagnosis: At least non-severe malnutrition in the context of acute illness however unable to fully assess d/t no NFPA completed    Previous Goals   Patient to consume % of nutritionally adequate meal trays TID, or the equivalent " with supplements/snacks.  Evaluation: Not met    Previous Nutrition Diagnosis  Inadequate oral intake related to depression as evidenced by pt refusing to eat X7 days    Evaluation: Improving    CURRENT NUTRITION DIAGNOSIS  Predicted inadequate nutrient intake related to depression/loss of appeite as evidenced by pt report      INTERVENTIONS  Implementation  Diet education:Diet adv v nutrition support within 2-3 days.  Patient to consume % of nutritionally adequate meal trays TID, or the equivalent with supplements/snacks.Reduce boost to q day    Goals  Patient to consume % of nutritionally adequate meal trays TID, or the equivalent with supplements/snacks.    Monitoring/Evaluation  Progress toward goals will be monitored and evaluated per protocol.

## 2020-06-18 LAB — LAMOTRIGINE SERPL-MCNC: 6.5 UG/ML (ref 2.5–15)

## 2020-06-18 PROCEDURE — 25000132 ZZH RX MED GY IP 250 OP 250 PS 637: Performed by: NURSE PRACTITIONER

## 2020-06-18 PROCEDURE — G0177 OPPS/PHP; TRAIN & EDUC SERV: HCPCS

## 2020-06-18 PROCEDURE — 12400001 ZZH R&B MH UMMC

## 2020-06-18 PROCEDURE — 99231 SBSQ HOSP IP/OBS SF/LOW 25: CPT | Performed by: NURSE PRACTITIONER

## 2020-06-18 RX ADMIN — LAMOTRIGINE 300 MG: 150 TABLET ORAL at 09:02

## 2020-06-18 RX ADMIN — PROTRIPTYLINE HYDROCHLORIDE 10 MG: 10 TABLET ORAL at 18:13

## 2020-06-18 RX ADMIN — ROPINIROLE HYDROCHLORIDE 1 MG: 1 TABLET, FILM COATED ORAL at 21:07

## 2020-06-18 RX ADMIN — ZIPRASIDONE HCL 60 MG: 60 CAPSULE ORAL at 09:02

## 2020-06-18 RX ADMIN — HYDROXYZINE HYDROCHLORIDE 25 MG: 25 TABLET, FILM COATED ORAL at 21:07

## 2020-06-18 RX ADMIN — RAMELTEON 8 MG: 8 TABLET ORAL at 21:07

## 2020-06-18 RX ADMIN — ZIPRASIDONE HCL 60 MG: 60 CAPSULE ORAL at 18:13

## 2020-06-18 RX ADMIN — ESZOPICLONE 3 MG: 3 TABLET, FILM COATED ORAL at 21:07

## 2020-06-18 RX ADMIN — QUETIAPINE FUMARATE 50 MG: 50 TABLET, EXTENDED RELEASE ORAL at 21:07

## 2020-06-18 ASSESSMENT — MIFFLIN-ST. JEOR: SCORE: 1686.98

## 2020-06-18 NOTE — PROGRESS NOTES
VM from [place],  with [employer] through [employer]. Would like to discuss follow up care and benefits.     [facility] called, [phone]    [facility] states has spoken with pt's , Samreen. According to [facility], pt's claims have been denied since March because she is not really active even though she keeps showing up as active. Misty has been told to call [employer] to make sure it is actually cancelled; no one else can do it on her behalf. [phone]     [facility] met with pt, put her in interview room with phone and number to call to make sure policy is cancelled.    Pt states that they apparently had her on a leave of absence and she advised them that she actually quit. She was told that this would be rectified by tonight.    Got information from  that pt is still showing up active on [employer]. [facility] called [employer], left message to check insurance.

## 2020-06-18 NOTE — PLAN OF CARE
"INITIAL OT NOTE  Problem: OT General Care Plan  Goal: OT Goal 1    Pt attended 1 out of 3 OT groups offered. Pt actively participated in occupational therapy clinic. Pt was able to ask for assistance with prompting, and independently initiated a structured creative expression task. Pt demonstrated good focus, planning, and attention to detail. Minimal interaction with peers observed, and she spent a majority of this group quietly engaged in her task. Calm and polite on approach, though minimally social, and she was able to accept a compliment on her project. Flat affect. Will continue to assess. Initial assessment to be completed upon additional group participation.    OT Self-Assessment  Pt was given and completed a written self-assessment form. OT staff reviewed with pt and explained the value of having them involved in their treatment plan, and provided options to meet current needs/self-identified goals.     Pt identified \"a lot of stress and negative thoughts about life\" as stressors/events that led to hospitalization, which she has been dealing with for \"2 weeks.\"    Pt identified the following symptoms that they are currently dealing with:   Emotions: Sadness, anxiety/fear, feeling abandoned, feeling helpless, mood swings, feeling depressed, guilt, and feeling overwhelmed   Thoughts: Negative thoughts, racing thoughts, memory problems, trouble concentrating, slowed thinking, self-harm or suicidal thoughts, and obsessive thoughts  Behaviors: Self-harming actions, spending too much time alone, problems in relationships or marriage, problems starting or completing projects, budget/money concerns, trouble using or finding a support system, and procrastinating     Self-identified coping skills: \"coloring and music\"  Self-identified social supports: \", kids, sisters, mom, dad, and therapist\"  Self-identified personal strengths: \"none\"    Goals: Identify and express feelings in a better way and manage stress "

## 2020-06-18 NOTE — PROGRESS NOTES
"[facility]   Psychiatric Progress Note      Impression:     Misty Parham is a 34-year-old female admitted to [facility] on 6/14/2020.  She was admitted as a voluntary patient through [facility] ER due to depressive symptoms and suicidal ideation with a plan to hang herself.  She is currently under commitment with Robles Sr in place.  She was hospitalized at [facility] in February - April 2020 at which time she underwent a course of 14 ECT treatments and was also hospitalized at [facility] in early May 2020.  She discharged to [facility] on May 8th.  She had been withdrawn, not attending groups at the [facility].  She made comments to [facility] staff about her intent to hang herself.  She reports conflict with her  and says they have been  for about 6 years, however her  reports she is , which patient later verified.  She also misses her children.  She saw the youngest about 2 weeks prior to admission and hasn't seen the older children in a couple months.  She reports that she didn't eat for 7 days preceding admission because \"I wanted to starve myself.\"  Since admission she has consumed some crackers.  She reports that she wasn't taking her medications regularly.  \"I take them for a couple days, then stop for a couple days because I feel like I don't deserve to be happy.\"  She denies side effects from meds and states they are helpful when she takes them regularly.  UTOX was negative.  Since admission, Rozerem, Lunesta, and Geodon were continued.  Lamictal was increased.  Seroquel was changed to XR formulation.  PRN Hydroxyzine and PRN Benadryl were continued.  Mood is slightly improved.  She endorses occasional passive suicidal ideation.          Diagnoses:     Major depressive disorder, severe, with psychotic features  Generalized anxiety disorder  Borderline personality disorder      "    Plan:     Medications:  Continue Lunesta 3 mg at HS.  Continue Rozerem 8 mg at HS.  Continue Benadryl 25-50 mg at HS PRN.  Continue Hydroxyzine 25-50 mg TID PRN.  Continue Geodon 60 mg BID with meals.  Continue Seroquel XR 50 mg at HS.  Continue Lamictal 300 mg daily.    She is under commitment with Travis Sr.  PD has not been revoked.  She has been discharged from [facility].  Outpatient  is recommending referral to [facility].  She would prefer discharge to her mother's home.        Telemedicine Visit: The patient's condition can be safely assessed and treated via synchronous audio and visual telemedicine encounter.       Start Time: 0827  Stop Time: 0834     Reason for Telemedicine Visit: COVID-19 precautions     Originating Site (Patient Location): [facility]     Distant Site (Provider Location): Provider Remote Setting     Consent:  The patient/guardian has verbally consented to: the potential risks and benefits of telemedicine (video visit) versus in person care; bill my insurance or make self-payment for services provided; and responsibility for payment of non-covered services.      Mode of Communication:  Video Conference via Skype     As the provider I attest to compliance with applicable laws and regulations related to telemedicine.      Attestation:  Patient has been seen and evaluated by me, WILLIAN Sánchez CNP  The patient was counseled on nature of illness and treatment plan/options  Care was coordinated with treatment team      Clinical Global Impressions  First:  Considering your total clinical experience with this particular patient population, how severe are the patient's symptoms at this time?: 7 (06/15/20 0520)  Compared to the patient's condition at the START of treatment, this patient's condition is: 4 (06/15/20 0520)  Most recent:  Considering your total clinical experience with this particular patient population, how severe are the  "patient's symptoms at this time?: 7 (06/15/20 0520)  Compared to the patient's condition at the START of treatment, this patient's condition is: 4 (06/15/20 0520)          Interim History:     The patient's care was discussed with the treatment team and chart notes were reviewed.  Pt was documented as sleeping 7.5 hours during the overnight shift.  BPs have been low and pulse elevated.  She reports feeling dizzy at times.  Pt advised to consume more water.  She has not been attending groups.  She napped yesterday.  States she talked to her son on the phone yesterday.  She asked, \"Am I being discharged tomorrow?  The ER doctor said I would be here 1 week.\"  Discussed that she is committed and disposition must be coordinated with her .  Pt reports her mood is depressed but slightly improved.  She reports occasional passive suicidal ideation and contracts for safety.  States her appetite is \"not the greatest, I make myself eat.\"  Denies side effects from meds.           Medications:     Current Facility-Administered Medications   Medication     acetaminophen (TYLENOL) tablet 650 mg     alum & mag hydroxide-simethicone (MAALOX  ES) suspension 30 mL     calcium carbonate (TUMS) chewable tablet 1,000 mg     diphenhydrAMINE (BENADRYL) tablet 25-50 mg     eszopiclone (LUNESTA) tablet 3 mg     guaiFENesin (ROBITUSSIN) 20 mg/mL solution 10 mL     hydrocortisone (CORTAID) 1 % cream     hydrOXYzine (ATARAX) tablet 25-50 mg     lamoTRIgine (LaMICtal) tablet 300 mg     loperamide (IMODIUM) capsule 2 mg     magnesium hydroxide (MILK OF MAGNESIA) suspension 30 mL     neomycin-bacitracin-polymyxin (NEOSPORIN) ointment     OLANZapine (zyPREXA) tablet 10 mg    Or     OLANZapine (zyPREXA) injection 10 mg     protriptyline (VIVACTIL) tablet 10 mg     QUEtiapine (SEROquel XR) 24 hr tablet 50 mg     ramelteon (ROZEREM) tablet 8 mg     rOPINIRole (REQUIP) tablet 1 mg     traZODone (DESYREL) tablet 50 mg     ziprasidone (GEODON) " "capsule 60 mg             Allergies:   No Known Allergies         Psychiatric Examination:     Appearance:  Alert, adequate grooming  Attitude: Cooperative however mostly passive  Eye Contact: Good  Mood:  Depressed, some improvement  Affect:  Mood congruent and blunted  Speech:  Minimal in content, low volume  Psychomotor Behavior:  No psychomotor abnormalities noted  Thought Process: Linear, not entirely logical, not tangential or circumstantial or disorganized  Associations:  No loose associations  Thought Content:  No obvious paranoia, delusions, ideas of reference, or grandiosity noted, denies auditory or visual hallucinations, endorsed occasional passive suicidal Ideations near baseline and contracts for safety, denies homicidal ideations  Insight:  Fair  Judgment:  Fair  Oriented to:  Date, time, person, and place  Attention Span and Concentration:  Intact  Recent and Remote Memory:  Fair  Language:  Appropriate based on interviewing  Fund of Knowledge:  Appropriate based on interviewing  Muscle Strength and Tone:  Normal upon visual inspection  Gait and Station:  Normal upon visual inspection           /77 (BP Location: Left arm)   Pulse 99   Temp 97  F (36.1  C) (Tympanic)   Resp 16   Ht 1.651 m (5' 5\")   Wt 99.6 kg (219 lb 9.6 oz)   LMP 06/03/2020   SpO2 99%   BMI 36.54 kg/m           Labs:     No results found for this or any previous visit (from the past 24 hour(s)).  "

## 2020-06-18 NOTE — PROGRESS NOTES
The following information was received from pt's  via email:    Also, when Misty was at the IRTS facility, she applied for General Assistance and Group Residential Housing to assist in paying the IRTS for her room and board.   Evidently, she got re-hospitalized before she could complete the phone interview with the [facility] s economic assistance folks so it would be most helpful if she could make that telephone call ([phone]) between 8AM-4PM in the next day or two.  Her financial team is Team 258 and Misty s financial case number is [id number].  If she s approved, she would get some back funds for the $104/month personal needs she should have received while she was at [facility] and they will get paid the room and board for the time she was there.   Should she go to another IRTS, the [facility] payments would be switched to the new IRTS and Misty would continue to receive her personal needs funds.   If she ends up back at her mother s home, she could get as much as $203/month from General Assistance instead of the $104; but she ll need to get quarterly medical opinion forms completed to continue those General Assistance benefits.  Bottom line is that she has to do the phone interview with the economic assistance team 258. If there s any way to assist her with getting that call & interview done, it would be most appreciated.    Thank you.     CTC had pt call. Pt called, states that she is covered from May to July 2 for IRTS.

## 2020-06-18 NOTE — PROGRESS NOTES
CTC called pt , Samreen, [phone]    Advised pt is being discharged tomorrow. Advised that the referral to [facility] has been made. Gave update on conversation with Kavon from insurance    Call from Samreen. States that mom has said that pt cannot return to her home until she actually completes an IRTS program of some sort.    States that mom said that pt was not taking care of her son or making sure he got to school. Mom states that the basement apartment where they lived was filthy; pt never cleaned, had no expectations of son who left food everywhere including smearing food on the walls.     The 13 year old son is now living with his dad. The other son (17) is with pt's sister.     Paternal grandmom wants to take both sons to [facility] to care for them based on pt's poor parenting.

## 2020-06-19 PROCEDURE — G0177 OPPS/PHP; TRAIN & EDUC SERV: HCPCS

## 2020-06-19 PROCEDURE — 12400001 ZZH R&B MH UMMC

## 2020-06-19 PROCEDURE — 25000132 ZZH RX MED GY IP 250 OP 250 PS 637: Performed by: NURSE PRACTITIONER

## 2020-06-19 PROCEDURE — 99232 SBSQ HOSP IP/OBS MODERATE 35: CPT | Performed by: NURSE PRACTITIONER

## 2020-06-19 RX ADMIN — PROTRIPTYLINE HYDROCHLORIDE 10 MG: 10 TABLET ORAL at 18:50

## 2020-06-19 RX ADMIN — LAMOTRIGINE 300 MG: 150 TABLET ORAL at 09:09

## 2020-06-19 RX ADMIN — ROPINIROLE HYDROCHLORIDE 1 MG: 1 TABLET, FILM COATED ORAL at 20:59

## 2020-06-19 RX ADMIN — ZIPRASIDONE HCL 60 MG: 60 CAPSULE ORAL at 18:50

## 2020-06-19 RX ADMIN — ZIPRASIDONE HCL 60 MG: 60 CAPSULE ORAL at 09:09

## 2020-06-19 RX ADMIN — QUETIAPINE FUMARATE 50 MG: 50 TABLET, EXTENDED RELEASE ORAL at 20:59

## 2020-06-19 RX ADMIN — RAMELTEON 8 MG: 8 TABLET ORAL at 20:59

## 2020-06-19 RX ADMIN — ESZOPICLONE 3 MG: 3 TABLET, FILM COATED ORAL at 20:59

## 2020-06-19 ASSESSMENT — ACTIVITIES OF DAILY LIVING (ADL)
ORAL_HYGIENE: INDEPENDENT
DRESS: INDEPENDENT
DRESS: SCRUBS (BEHAVIORAL HEALTH)
HYGIENE/GROOMING: INDEPENDENT
LAUNDRY: WITH SUPERVISION
ORAL_HYGIENE: INDEPENDENT
LAUNDRY: WITH SUPERVISION
HYGIENE/GROOMING: INDEPENDENT

## 2020-06-19 NOTE — PROGRESS NOTES
Work Completed:  [facility] reviewed chart  and remotely attended team discussion.  [facility] called and rescheduled pt's therapy appointment with Therapy - Anderson Moss, DANILO - [facility] [place] [place] due to hospitalization.        Discharge plan or goal:  Pt's mental health.  Pt is being referred to [facility]. Mother of pt declined to have pt discharged to mother's home.                  Barriers to discharge: pt recently failed placement at [facility].  She is under commitment but not revoked.  Her [facility] is assisting with discharge plan.

## 2020-06-19 NOTE — PLAN OF CARE
Problem: OT General Care Plan  Goal: OT Goal 1    Pt attended 3 out of 3 OT groups offered. Pt actively participated in occupational therapy clinic. Pt was able to ask for assistance as needed, and independently returned to a structured creative expression task. Pt demonstrated good focus, planning, and attention to detail. No interaction with peers observed, and she appeared withdrawn. Pt attended a mental health management group with a focus on coping through movement to facilitate relaxation and stress management via chair yoga. She chose to attentively observe the movements rather than actively participate. Calm, pleasant, and cooperative throughout groups.

## 2020-06-19 NOTE — PROGRESS NOTES
[facility] made referrals to the following IRTS programs:     [facility]: [phone]  Fax: [phone].     [facility] in [place]  Phone: [phone]  Fax: [phone].   Email: [email or web address]      [facility] Phone: [phone] Fax: [phone]     [facility]. IRTS Referrals Contact ([facility])  Will, Admissions/Intake Contact  Direct: [phone]  Referral Fax: [phone]    [facility]: [phone] #[phone]  Fax: [phone]    [facility]: [phone]  Fax: [phone]      [facility]: [phone]  Fax: [phone]

## 2020-06-19 NOTE — PLAN OF CARE
"RN 48 hour assessment:  Patient is on a room lockout for certain hours, per her provider's order. Patient did not resist being locked out of her room. She looked depressed and did not make eye contact with this writer. She barely answered questions when this writer attempted to check in with her a few times this shift. Patient said she did not sleep well, but that is normal for her. Patient said her chronic suicidal thoughts \"aren't bad today.\" Patient did not appear to socialize with her peers. She did not participate in chair yoga but sat through it.  "

## 2020-06-19 NOTE — PROGRESS NOTES
"[facility]   Psychiatric Progress Note      Impression:     Msity Parham is a 34-year-old female admitted to [facility] on 6/14/2020.  She was admitted as a voluntary patient through [facility] due to depressive symptoms and suicidal ideation with a plan to hang herself.  She is currently under commitment with Robels Sr in place.  She was hospitalized at [facility] in February - April 2020 at which time she underwent a course of 14 ECT treatments and was also hospitalized at [facility] in early May 2020.  She discharged to [facility] on May 8th.  She had been withdrawn, not attending groups at the [facility].  She made comments to [facility] staff about her intent to hang herself.  She reports conflict with her  and says they have been  for about 6 years, however her  reports she is , which patient later verified.  She also misses her children.  She saw the youngest about 2 weeks prior to admission and hasn't seen the older children in a couple months.  She reports that she didn't eat for 7 days preceding admission because \"I wanted to starve myself.\"  Since admission she has consumed some crackers.  She reports that she wasn't taking her medications regularly.  \"I take them for a couple days, then stop for a couple days because I feel like I don't deserve to be happy.\"  She denies side effects from meds and states they are helpful when she takes them regularly.  UTOX was negative.  Since admission, Rozerem, Lunesta, and Geodon were continued.  Lamictal was increased.  Seroquel was changed to XR formulation.  PRN Hydroxyzine and PRN Benadryl were continued.  Mood is slightly improved.  She endorses occasional passive suicidal ideation.          Diagnoses:     Major depressive disorder, severe, with psychotic features  Generalized anxiety disorder  Borderline personality disorder      "    Plan:     Medications:  Continue Lunesta 3 mg at HS.  Continue Rozerem 8 mg at HS.  Continue Benadryl 25-50 mg at HS PRN.  Continue Hydroxyzine 25-50 mg TID PRN.  Continue Geodon 60 mg BID with meals.  Continue Seroquel XR 50 mg at HS.  Continue Lamictal 300 mg daily.    She is under commitment with Travis Sr.  PD has not been revoked.  She has been discharged from [facility].  Plan for discharge to [facility] or an IRTS.  She is not welcome to return to her mothers home unless she has successfully completed one of these.        Telemedicine Visit: The patient's condition can be safely assessed and treated via synchronous audio and visual telemedicine encounter.       Start Time: 0837  Stop Time: 0845     Reason for Telemedicine Visit: COVID-19 precautions     Originating Site (Patient Location): [facility]     Distant Site (Provider Location): Provider Remote Setting     Consent:  The patient/guardian has verbally consented to: the potential risks and benefits of telemedicine (video visit) versus in person care; bill my insurance or make self-payment for services provided; and responsibility for payment of non-covered services.      Mode of Communication:  Video Conference via Skype     As the provider I attest to compliance with applicable laws and regulations related to telemedicine.      Attestation:  Patient has been seen and evaluated by me, WILLIAN Sánchez CNP  The patient was counseled on nature of illness and treatment plan/options  Care was coordinated with treatment team      Clinical Global Impressions  First:  Considering your total clinical experience with this particular patient population, how severe are the patient's symptoms at this time?: 7 (06/15/20 0520)  Compared to the patient's condition at the START of treatment, this patient's condition is: 4 (06/15/20 0520)  Most recent:  Considering your total clinical experience with this particular patient  "population, how severe are the patient's symptoms at this time?: 7 (06/15/20 0520)  Compared to the patient's condition at the START of treatment, this patient's condition is: 4 (06/15/20 0520)          Interim History:     The patient's care was discussed with the treatment team and chart notes were reviewed.  Pt was documented as sleeping 7 hours during the overnight shift.  She attended her first group yesterday.  She has been spending much of her time in her room.  She has been spending some time in the milieu watching TV and coloring.  She took PRN Hydroxyzine x 1 yesterday.  Pt reports she was surprised to learn that her mother will not allow her to live with her until she completes an IRTS program.  She had previously stated her mother said she was welcome to reside with her but today admitted that they had never had a conversation about it.  Discussed how her lack of participation is a barrier to placement and that unit activities are meant to help with recovery.  Discussed implementing a room lock out.  Pt was not pleased with this but stated she understands the rationale.  \"I need to put more effort in.\"  States her anxiety was \"not too bad\" in the group she attended yesterday.  She had suicidal ideation yesterday but denies SI today.  Sleep is \"not the greatest.\"  She reports eating well.           Medications:     Current Facility-Administered Medications   Medication     acetaminophen (TYLENOL) tablet 650 mg     alum & mag hydroxide-simethicone (MAALOX  ES) suspension 30 mL     calcium carbonate (TUMS) chewable tablet 1,000 mg     diphenhydrAMINE (BENADRYL) tablet 25-50 mg     eszopiclone (LUNESTA) tablet 3 mg     guaiFENesin (ROBITUSSIN) 20 mg/mL solution 10 mL     hydrocortisone (CORTAID) 1 % cream     hydrOXYzine (ATARAX) tablet 25-50 mg     lamoTRIgine (LaMICtal) tablet 300 mg     loperamide (IMODIUM) capsule 2 mg     magnesium hydroxide (MILK OF MAGNESIA) suspension 30 mL     " "neomycin-bacitracin-polymyxin (NEOSPORIN) ointment     OLANZapine (zyPREXA) tablet 10 mg    Or     OLANZapine (zyPREXA) injection 10 mg     protriptyline (VIVACTIL) tablet 10 mg     QUEtiapine (SEROquel XR) 24 hr tablet 50 mg     ramelteon (ROZEREM) tablet 8 mg     rOPINIRole (REQUIP) tablet 1 mg     traZODone (DESYREL) tablet 50 mg     ziprasidone (GEODON) capsule 60 mg             Allergies:   No Known Allergies         Psychiatric Examination:     Appearance:  Alert, adequate grooming  Attitude: Cooperative however mostly passive  Eye Contact: Good  Mood:  Depressed, some improvement  Affect:  Mood congruent and blunted  Speech:  Minimal in content, low volume  Psychomotor Behavior:  No psychomotor abnormalities noted  Thought Process: Linear, not entirely logical, not tangential or circumstantial or disorganized  Associations:  No loose associations  Thought Content:  No obvious paranoia, delusions, ideas of reference, or grandiosity noted, denies auditory or visual hallucinations, endorsed occasional passive suicidal Ideations near baseline and contracts for safety, denies homicidal ideations  Insight:  Fair  Judgment:  Fair  Oriented to:  Date, time, person, and place  Attention Span and Concentration:  Intact  Recent and Remote Memory:  Fair  Language:  Appropriate based on interviewing  Fund of Knowledge:  Appropriate based on interviewing  Muscle Strength and Tone:  Normal upon visual inspection  Gait and Station:  Normal upon visual inspection           /77 (BP Location: Left arm)   Pulse 94   Temp 97.4  F (36.3  C) (Oral)   Resp 16   Ht 1.651 m (5' 5\")   Wt 99.6 kg (219 lb 9.6 oz)   LMP 06/03/2020   SpO2 99%   BMI 36.54 kg/m           Labs:     No results found for this or any previous visit (from the past 24 hour(s)).  "

## 2020-06-19 NOTE — PROGRESS NOTES
Pt isolated to room majority of shift, out in milieu to watch TV or color sparely. Pt has chronic SI and SIB thoughts, but states that she could not do anything in the hospital. Pt however is unsure if she would be able to alert staff if this ever changes. Pt keeps to self and endorses depression/anxiety.     06/18/20 5846   Behavioral Health   Hallucinations denies / not responding to hallucinations   Thinking intact   Orientation person: oriented;place: oriented   Memory baseline memory   Insight insight appropriate to situation   Judgement impaired   Eye Contact at examiner   Affect blunted, flat   Mood depressed;anxious   Physical Appearance/Attire attire appropriate to age and situation   Hygiene neglected grooming - unclean body, hair, teeth   Suicidality chronic thoughts with no stated plan   1. Wish to be Dead (Recent) No   2. Non-Specific Active Suicidal Thoughts (Recent) No   Psycho Education   Type of Intervention 1:1 intervention   Response observes from a distance   Activities of Daily Living   Hygiene/Grooming independent   Oral Hygiene independent   Dress independent   Laundry with supervision   Room Organization independent

## 2020-06-20 PROCEDURE — 12400001 ZZH R&B MH UMMC

## 2020-06-20 PROCEDURE — 25000132 ZZH RX MED GY IP 250 OP 250 PS 637: Performed by: NURSE PRACTITIONER

## 2020-06-20 RX ADMIN — RAMELTEON 8 MG: 8 TABLET ORAL at 20:53

## 2020-06-20 RX ADMIN — PROTRIPTYLINE HYDROCHLORIDE 10 MG: 10 TABLET ORAL at 18:20

## 2020-06-20 RX ADMIN — ZIPRASIDONE HCL 60 MG: 60 CAPSULE ORAL at 09:03

## 2020-06-20 RX ADMIN — QUETIAPINE FUMARATE 50 MG: 50 TABLET, EXTENDED RELEASE ORAL at 20:53

## 2020-06-20 RX ADMIN — ESZOPICLONE 3 MG: 3 TABLET, FILM COATED ORAL at 20:53

## 2020-06-20 RX ADMIN — ROPINIROLE HYDROCHLORIDE 1 MG: 1 TABLET, FILM COATED ORAL at 20:53

## 2020-06-20 RX ADMIN — ZIPRASIDONE HCL 60 MG: 60 CAPSULE ORAL at 18:20

## 2020-06-20 RX ADMIN — LAMOTRIGINE 300 MG: 150 TABLET ORAL at 09:03

## 2020-06-20 RX ADMIN — MAGNESIUM HYDROXIDE 30 ML: 400 SUSPENSION ORAL at 20:53

## 2020-06-20 NOTE — PROGRESS NOTES
"Pt mentioned she had an \"okay\" day, and was irritated twice. Pt states when she gets irritated she digs her finger nails to her skin.    Pt states thoughts of SI/SIB come and go. Pt does contract for safety and was advised to let staff know when struggling. Pt rates anxiety and depression 5/10.      06/19/20 2100   Behavioral Health   Hallucinations denies / not responding to hallucinations   Thinking poor concentration;distractable   Orientation person: oriented;place: oriented;date: oriented;time: oriented   Memory baseline memory   Insight poor   Judgement impaired   Eye Contact at examiner   Affect blunted, flat   Mood mood is calm;depressed;anxious   Physical Appearance/Attire untidy   Hygiene neglected grooming - unclean body, hair, teeth   Suicidality thoughts only   2. Non-Specific Active Suicidal Thoughts (Recent) Yes   3. Active Sucidal Ideation with any Methods (Not Plan) Without Intent to Act (Recent) No   Self Injury other (see comment)  (When upset digging nail to skin )   Elopement   (none observed )   Activity withdrawn;isolative   Speech clear;coherent   Medication Sensitivity no observed side effects;no stated side effects   Psychomotor / Gait balanced;steady   Psycho Education   Type of Intervention 1:1 intervention   Response participates with encouragement   Hours 0.5   Treatment Detail Check in    Activities of Daily Living   Hygiene/Grooming independent   Oral Hygiene independent   Dress independent   Laundry with supervision   Room Organization independent     "

## 2020-06-20 NOTE — PROGRESS NOTES
Pt was visible in the lounge for the majority of this shift , sleeping off and on.  Pt was locked out of her room per the order. Pt ate both breakfast and lunch and spent time watching TV with peers. Pt presents with blunted/ flat affect. Reported SI thoughts only , denies SIB and HI. Pt was calm and cooperative on approach , no further concerns.        06/20/20 1348   Behavioral Health   Hallucinations denies / not responding to hallucinations   Thinking poor concentration   Orientation place: oriented;date: oriented;time: oriented   Memory baseline memory   Insight poor   Judgement impaired   Eye Contact at examiner   Affect blunted, flat;sad   Mood depressed;mood is calm   Physical Appearance/Attire attire appropriate to age and situation   Hygiene well groomed   Suicidality thoughts only;other (see comments)  (Pt states she would contract for safety.)   1. Wish to be Dead (Recent) No   Wish to be Dead Description (Recent)   (none observed.)   2. Non-Specific Active Suicidal Thoughts (Recent) No   Self Injury other (see comment)  (none observed. )   Activity withdrawn   Speech clear   Medication Sensitivity no stated side effects;no observed side effects   Psychomotor / Gait balanced;steady   Psycho Education   Type of Intervention 1:1 intervention   Response participates, initiates socially appropriate   Hours 0.5   Treatment Detail   (check in )   Activities of Daily Living   Hygiene/Grooming independent   Oral Hygiene independent   Dress independent   Laundry with supervision   Room Organization independent

## 2020-06-21 PROCEDURE — 25000132 ZZH RX MED GY IP 250 OP 250 PS 637: Performed by: NURSE PRACTITIONER

## 2020-06-21 PROCEDURE — 12400001 ZZH R&B MH UMMC

## 2020-06-21 RX ADMIN — QUETIAPINE FUMARATE 50 MG: 50 TABLET, EXTENDED RELEASE ORAL at 21:21

## 2020-06-21 RX ADMIN — RAMELTEON 8 MG: 8 TABLET ORAL at 21:21

## 2020-06-21 RX ADMIN — ROPINIROLE HYDROCHLORIDE 1 MG: 1 TABLET, FILM COATED ORAL at 21:21

## 2020-06-21 RX ADMIN — PROTRIPTYLINE HYDROCHLORIDE 10 MG: 10 TABLET ORAL at 18:10

## 2020-06-21 RX ADMIN — LAMOTRIGINE 300 MG: 150 TABLET ORAL at 08:51

## 2020-06-21 RX ADMIN — ESZOPICLONE 3 MG: 3 TABLET, FILM COATED ORAL at 21:21

## 2020-06-21 RX ADMIN — ZIPRASIDONE HCL 60 MG: 60 CAPSULE ORAL at 18:10

## 2020-06-21 RX ADMIN — ZIPRASIDONE HCL 60 MG: 60 CAPSULE ORAL at 08:51

## 2020-06-21 ASSESSMENT — ACTIVITIES OF DAILY LIVING (ADL)
DRESS: SCRUBS (BEHAVIORAL HEALTH);INDEPENDENT
ORAL_HYGIENE: INDEPENDENT
LAUNDRY: WITH SUPERVISION
HYGIENE/GROOMING: HANDWASHING;INDEPENDENT
ORAL_HYGIENE: INDEPENDENT
HYGIENE/GROOMING: INDEPENDENT
DRESS: INDEPENDENT

## 2020-06-21 NOTE — PROGRESS NOTES
Pt awake entire shift.  Pt ate meals, was out in milieu, and is social upon approach.  Pt affect was brighter, pt was more animated with others.  Pt is still depressed.  Pt is quiet, sits on the periphery of the milieu.  Pleasant and cooperative.       06/21/20 1301   Sleep/Rest/Relaxation   Day/Evening Time Hours up all shift   Behavioral Health   Hallucinations denies / not responding to hallucinations   Thinking poor concentration   Orientation person: oriented;place: oriented;date: oriented;time: oriented   Memory baseline memory   Insight poor   Judgement impaired   Eye Contact at examiner   Affect blunted, flat   Mood depressed;anxious   Physical Appearance/Attire attire appropriate to age and situation   Hygiene well groomed   Suicidality other (see comments)  (denies)   1. Wish to be Dead (Recent) No   2. Non-Specific Active Suicidal Thoughts (Recent) No   Activity other (see comment)  (out in milieu)   Speech clear;coherent   Psychomotor / Gait balanced;steady   Coping/Psychosocial   Verbalized Emotional State anxiety;depression   Plan of Care Reviewed With patient   Psycho Education   Type of Intervention 1:1 intervention   Response participates with encouragement   Activities of Daily Living   Hygiene/Grooming handwashing;independent   Oral Hygiene independent   Dress scrubs (behavioral health);independent   Activity   Activity Assistance Provided independent

## 2020-06-21 NOTE — PLAN OF CARE
"Pt. was visible in milieu and minimally social with peers. Pt. was out for meals and snacks. Pt. presents with flat blunt affect. Pt. did not attend group. Pt. was cooperative with room lockout but states that it is \"frustrating to be locked out of my room, other patients spend a lot of time in their room and they are not locked out\". Pt. states that she had no goal for today. Pt. states her \"mood is not so good\". Pt. endorses depression 8/10 and anxiety 5/10 with 10 being the worst. Pt. states that she has suicidal thoughts on and off and contracts for safety while on the unit. Pt. states that the only thing she does sometimes is \"dig into her skin with her nails. Pt. denied hallucinations. Pt. c/o of not having a bowel of movement for more than a week. PRN Milk of Magnesia offered which she accepted. Pt. was compliant with scheduled mediations. Nursing will continue to monitor.   "

## 2020-06-22 ENCOUNTER — DOCUMENTATION ONLY (OUTPATIENT)
Dept: BEHAVIORAL HEALTH | Facility: CLINIC | Age: 35
End: 2020-06-22

## 2020-06-22 PROCEDURE — 99207 ZZC CONSULT E&M CHANGED TO INITIAL LEVEL: CPT | Performed by: PHYSICIAN ASSISTANT

## 2020-06-22 PROCEDURE — G0177 OPPS/PHP; TRAIN & EDUC SERV: HCPCS

## 2020-06-22 PROCEDURE — 99232 SBSQ HOSP IP/OBS MODERATE 35: CPT | Performed by: NURSE PRACTITIONER

## 2020-06-22 PROCEDURE — 25000132 ZZH RX MED GY IP 250 OP 250 PS 637: Performed by: NURSE PRACTITIONER

## 2020-06-22 PROCEDURE — 90853 GROUP PSYCHOTHERAPY: CPT

## 2020-06-22 PROCEDURE — 12400001 ZZH R&B MH UMMC

## 2020-06-22 PROCEDURE — 99221 1ST HOSP IP/OBS SF/LOW 40: CPT | Performed by: PHYSICIAN ASSISTANT

## 2020-06-22 RX ORDER — HYDROXYZINE HYDROCHLORIDE 25 MG/1
25 TABLET, FILM COATED ORAL 2 TIMES DAILY PRN
Qty: 60 TABLET | Refills: 0 | Status: SHIPPED | OUTPATIENT
Start: 2020-06-22 | End: 2021-02-10

## 2020-06-22 RX ORDER — PROTRIPTYLINE HYDROCHLORIDE 10 MG/1
10 TABLET, FILM COATED ORAL
Qty: 30 TABLET | Refills: 0 | Status: ON HOLD | OUTPATIENT
Start: 2020-06-22 | End: 2021-04-07

## 2020-06-22 RX ORDER — HYDROXYZINE HYDROCHLORIDE 25 MG/1
25 TABLET, FILM COATED ORAL 2 TIMES DAILY PRN
Status: DISCONTINUED | OUTPATIENT
Start: 2020-06-22 | End: 2020-06-23 | Stop reason: HOSPADM

## 2020-06-22 RX ORDER — ESZOPICLONE 3 MG/1
3 TABLET, FILM COATED ORAL AT BEDTIME
Qty: 30 TABLET | Refills: 0 | Status: SHIPPED | OUTPATIENT
Start: 2020-06-22 | End: 2021-02-10

## 2020-06-22 RX ORDER — QUETIAPINE FUMARATE 50 MG/1
100 TABLET, EXTENDED RELEASE ORAL EVERY EVENING
Status: DISCONTINUED | OUTPATIENT
Start: 2020-06-22 | End: 2020-06-23 | Stop reason: HOSPADM

## 2020-06-22 RX ORDER — LAMOTRIGINE 150 MG/1
300 TABLET ORAL DAILY
Qty: 60 TABLET | Refills: 0 | Status: ON HOLD | OUTPATIENT
Start: 2020-06-23 | End: 2021-04-07

## 2020-06-22 RX ORDER — RAMELTEON 8 MG/1
8 TABLET ORAL AT BEDTIME
Qty: 30 TABLET | Refills: 0 | Status: ON HOLD | OUTPATIENT
Start: 2020-06-22 | End: 2021-04-16

## 2020-06-22 RX ORDER — POLYETHYLENE GLYCOL 3350 17 G/17G
17 POWDER, FOR SOLUTION ORAL 2 TIMES DAILY
Status: DISCONTINUED | OUTPATIENT
Start: 2020-06-22 | End: 2020-06-23 | Stop reason: HOSPADM

## 2020-06-22 RX ORDER — ROPINIROLE 1 MG/1
1 TABLET, FILM COATED ORAL AT BEDTIME
Qty: 30 TABLET | Refills: 0 | Status: ON HOLD | OUTPATIENT
Start: 2020-06-22 | End: 2021-04-16

## 2020-06-22 RX ORDER — ZIPRASIDONE HYDROCHLORIDE 60 MG/1
60 CAPSULE ORAL 2 TIMES DAILY WITH MEALS
Qty: 60 CAPSULE | Refills: 0 | Status: SHIPPED | OUTPATIENT
Start: 2020-06-22 | End: 2021-02-10

## 2020-06-22 RX ORDER — QUETIAPINE FUMARATE 50 MG/1
100 TABLET, EXTENDED RELEASE ORAL EVERY EVENING
Qty: 60 EACH | Refills: 0 | Status: SHIPPED | OUTPATIENT
Start: 2020-06-22 | End: 2021-02-10

## 2020-06-22 RX ADMIN — RAMELTEON 8 MG: 8 TABLET ORAL at 21:27

## 2020-06-22 RX ADMIN — ESZOPICLONE 3 MG: 3 TABLET, FILM COATED ORAL at 21:27

## 2020-06-22 RX ADMIN — HYDROXYZINE HYDROCHLORIDE 25 MG: 25 TABLET, FILM COATED ORAL at 21:31

## 2020-06-22 RX ADMIN — ZIPRASIDONE HCL 60 MG: 60 CAPSULE ORAL at 08:47

## 2020-06-22 RX ADMIN — ZIPRASIDONE HCL 60 MG: 60 CAPSULE ORAL at 18:32

## 2020-06-22 RX ADMIN — POLYETHYLENE GLYCOL 3350 17 G: 17 POWDER, FOR SOLUTION ORAL at 11:45

## 2020-06-22 RX ADMIN — LAMOTRIGINE 300 MG: 150 TABLET ORAL at 08:47

## 2020-06-22 RX ADMIN — POLYETHYLENE GLYCOL 3350 17 G: 17 POWDER, FOR SOLUTION ORAL at 21:27

## 2020-06-22 RX ADMIN — ROPINIROLE HYDROCHLORIDE 1 MG: 1 TABLET, FILM COATED ORAL at 21:27

## 2020-06-22 RX ADMIN — QUETIAPINE FUMARATE 100 MG: 50 TABLET, EXTENDED RELEASE ORAL at 21:27

## 2020-06-22 RX ADMIN — PROTRIPTYLINE HYDROCHLORIDE 10 MG: 10 TABLET ORAL at 18:32

## 2020-06-22 ASSESSMENT — ACTIVITIES OF DAILY LIVING (ADL)
LAUNDRY: WITH SUPERVISION
DRESS: INDEPENDENT
HYGIENE/GROOMING: INDEPENDENT
ORAL_HYGIENE: INDEPENDENT
LAUNDRY: WITH SUPERVISION
DRESS: INDEPENDENT
ORAL_HYGIENE: INDEPENDENT
HYGIENE/GROOMING: INDEPENDENT

## 2020-06-22 NOTE — PROGRESS NOTES
Jeramy with [facility]. Have an opening for a female. Could take her    Did the interview; can take her tomorrow mid-morning. Pharmacy, [facility] in [place].    [address] [address]

## 2020-06-22 NOTE — PROGRESS NOTES
Work Completed: reviewed chart and remotely attended team discussion.  Entered team note.  AVS has appointments    Discharge plan or goal: discharge to IRTS.                Barriers to discharge: acceptance to an IRTS

## 2020-06-22 NOTE — PROGRESS NOTES
Prior Authorization **INITIATED**    Medication: Eszopiclone 3mg tabs  Insurance Company: [place] Medicaid ([facility]) - Phone [phone] Fax [phone]  Pharmacy Filling the Rx: n/a - Patient has not yet been discharged, and there are no outpatient orders for this medication yet  Start Date: 6/22/2020  Reference #: CoverMyMeds Key: [id number]  Comments:  Proactive Prior Authorization. Was not covered with previous fill after discharge from [facility].      Yudy Burger CPhT  [facility]charge [facility] Liaison  Pronouns: She/Her/Hers    [facility]  [facility]  [address] [place] [address] [address], [place] [address]   [email or web address]  [email or web address]   Phone: [phone]  Pager: [phone]  Fax: [phone]

## 2020-06-22 NOTE — PROGRESS NOTES
Work Completed: reviewed chart and remotely attended team discussion.  Appointment present in AVS    Discharge plan or goal: pt is to discharge tomorrow and will be admitted to [facility].   She needs to be out the door and in a taxi by 9:30am                Barriers to discharge: none.  Her PD was never revoked so no need to have another Provisional Discharge agreement

## 2020-06-22 NOTE — PLAN OF CARE
BEHAVIORAL TEAM DISCUSSION    Participants: Hina Morris CNP; Sarah Evangelista and Alicia Coto CTC's;  Lety Ramirez, RN; Jan Moses OT   Progress: pt has improved with decrease in symptoms.  She remains under commitment with no revocation of discharge deemed necessary.  Pt is currently having an phone intake with [facility].    Anticipated length of stay: if the outcome is acceptance to the [facility], she will discharge in the next day or so.  Continued Stay Criteria/Rationale: pt needs door to door to a structured program such as IRTS level of care.   Medical/Physical: She is being treated for constipation  Precautions:   Behavioral Orders   Procedures     Code 1 - Restrict to Unit     Routine Programming     As clinically indicated     Self Injury Precaution     Status 15     Every 15 minutes.     Suicide precautions     Patients on Suicide Precautions should have a Combination Diet ordered that includes a Diet selection(s) AND a Behavioral Tray selection for Safe Tray - with utensils, or Safe Tray - NO utensils       Plan: discharge to an IRTS when bed is available.  Continue with psychiatry.  Rationale for change in precautions or plan: no change in plan.

## 2020-06-22 NOTE — PLAN OF CARE
"Nursing Assessment:  Misty was up ad priya, slept in late then has been out of her room since 10:15 am. Misty attended OT group at 1:25 pm.   Pt denied having suicidal ideation or self harm thoughts. Misty described feeling \"a little bit depressed, but I feel better than when I first came in.\" Pt's affect was flat, she was pleasant on approach, med adherent, cooperative.    "

## 2020-06-22 NOTE — DISCHARGE INSTRUCTIONS
Behavioral Discharge Planning and Instructions      Summary:    You were admitted on 6/14/2020  due to Suicidal Ideations.  You were treated by Hina Morris NP and discharged on 6/23/2020 from [facility] to [facility] at [address] [place] [id number]    You were previously  committed to the [facility] and the [facility]; and your provisional discharge was not revoked while you were here.  Please keep in touch with your [facility] : Samreen at [phone] for expectations given your commitment history.        Principal Diagnosis:     Major depressive disorder, severe, with psychotic features  Generalized anxiety disorder  Borderline personality disorder      Health Care Follow-up Appointments:     PCP - Kathleen Spencer - [facility]     Psychiatry - Dr. Hernández - [facility]  Appointment scheduled for July 2, 2020 @ 10am via telehealth / phone    [facility]  [address]     MPLS  Ph:  [phone]    Fx:  [phone]     Therapy - Anderson [facility]  Follow up appointment scheduled on 6/29/2020 @ 12:30 via phone  [facility]  [address]     [facility]  Ph:  [phone]    Fx:  [phone]     [facility]  - Samreen Machuca ([phone])        Attend all scheduled appointments with your outpatient providers. Call at least 24 hours in advance if you need to reschedule an appointment to ensure continued access to your outpatient providers.   Major Treatments, Procedures and Findings:  You were provided with: a psychiatric assessment, medication evaluation and/or management and milieu management    Symptoms to Report: losing more sleep, mood getting worse or thoughts of suicide    Early warning signs can include: increased depression or anxiety sleep disturbances increased thoughts or behaviors of suicide or self-harm     Safety and Wellness:  Take  "all medicines as directed.  Make no changes unless your doctor suggests them.      Follow treatment recommendations.  Refrain from alcohol and non-prescribed drugs.  If there is a concern for safety, call [phone].    Resources:   Crisis Intervention: 824.735.8849 or 133-734-1894 (TTY: 245.643.4860).  Call anytime for help.  National [place] on [place]ness (www.mn.davian.org): 491.214.4695 or 917-575-4122.  National Suicide Prevention Line (www.mentalhealthmn.org): 528-721-JBGT (9076)  [facility] Crisis (COPE) Response - Adult 936 077-0842  Text 4 Life: txt \"LIFE\" to 36233 for immediate support and crisis intervention  Crisis text line: Text \"[place]\" to 750096. Free, confidential, 24/7.  Crisis Intervention: 793.946.2339 or 708-168-6503. Call anytime for help.       The treatment team has appreciated the opportunity to work with you.     If you have any questions or concerns our unit number is [phone]      "

## 2020-06-22 NOTE — PROGRESS NOTES
Pt was out and visible in the milieu this evening. Pt states she was not able to watch TV as she has racing thoughts of SI. Pt contracts for safety while here. Pt rates anxiety 5/10 and 8/10 for depression. Pt reports constipation as a medication.        06/21/20 2200   Behavioral Health   Hallucinations denies / not responding to hallucinations   Thinking poor concentration   Orientation person: oriented;place: oriented;date: oriented;time: oriented   Memory baseline memory   Insight poor   Judgement impaired   Eye Contact at examiner   Affect blunted, flat   Mood depressed   Physical Appearance/Attire untidy;disheveled   Hygiene neglected grooming - unclean body, hair, teeth   Suicidality thoughts and plan   1. Wish to be Dead (Recent) No   2. Non-Specific Active Suicidal Thoughts (Recent) No   Change in Protective Factors? No   Enviromental Risk Factors None   Self Injury other (see comment)  (none observed )   Elopement   (none observed )   Activity isolative;withdrawn   Speech coherent;clear   Medication Sensitivity other (see comment)  (constipation )   Psychomotor / Gait balanced;steady   Activities of Daily Living   Hygiene/Grooming independent   Oral Hygiene independent   Dress independent   Laundry with supervision   Room Organization independent

## 2020-06-22 NOTE — CONSULTS
[facility]  Internal Medicine Consult     Misty Parham MRN# [id number]   Age: 34 year old YOB: 1985     Date of Admission: 6/14/2020  Date of Consult: 6/22/2020    Primary Care Provider: Monse Chen    Requesting Service: Behavioral Health - Baron Laughlin MD  Reason for Consult: General Medical Evaluation      SUBJECTIVE   CC:   Diarrhea/constipation    Assessment and Plan/Recommendations:   Misty Malnoe is a 34 year old female with history of chronic diarrhea, depression, anxiety and borderline personality disorder who was admitted to [facility] with SI. Medicine consulted for H&P prior to placement in IRTS facility    Depression with SI, anxiety, borderline personality disorder: Plan to hang self  - Management per psych     Chronic diarrhea/constipation: Patient thinks 2/2 psych meds. Stable and at BL. Continue prn bowel meds      Currently, medically stable and internal medicine will sign off. Please contact if future questions or concerns arise. Thank you for the opportunity to be a part of this patient's care.      Katrina Dobson PA-C  Internal Medicine [facility] Hospitalist  [phone]  June 22, 2020         HPI:   Misty Malone is a 34 year old female with history of chronic diarrhea, depression, anxiety and borderline personality disorder who was admitted to [facility] with SI. Medicine consulted for H&P prior to placement in IRTS facility    Patient reports she is medically healthy. She has chronic mixed diarrhea and constipation which she thinks is 2/2 psych meds. She took a stool softener today and thinks she may need to use the bathroom shortly. Denies recent illness, fever, headache, confusion, acute visual changes, dizziness, chest pain, dyspnea, cough, abdominal pain, N/V, urinary symptoms, change in bowels, peripheral edema, new onset joint pain, or rash       Past Medical History:     Past Medical History:   Diagnosis Date     Depressive disorder      "    Reviewed and updated in [facility].     Past Surgical History:      Past Surgical History:   Procedure Laterality Date     CHOLECYSTECTOMY           Social History:   Tobacco use: Denies  Alcohol use: Denies  Elicit drug use: Denies     Family History:     Family History   Problem Relation Age of Onset     Diabetes Father          Allergies:   No Known Allergies      Medications:   Reviewed. Please see MAR     Review of Systems:   10 point ROS of systems including Constitutional, Eyes, Respiratory, Cardiovascular, Gastroenterology, Genitourinary, Integumentary, Muscularskeletal, Psychiatric were all negative except for pertinent positives noted in my HPI.    OBJECTIVE   Physical Exam:   Vitals were reviewed  Blood pressure 110/78, pulse 89, temperature 96.8  F (36  C), temperature source Tympanic, resp. rate 16, height 1.651 m (5' 5\"), weight 99.6 kg (219 lb 9.6 oz), last menstrual period 06/03/2020, SpO2 98 %, not currently breastfeeding.  General: Alert and oriented x3, flat affect  HEENT: Anicteric sclera, MMM  Cardiovascular: RRR, S1S2. No murmur noted  Lungs: CTAB without wheezing or crackles   GI: Abdomen soft, non-tender with bowel sounds present. No guarding or rebound   Vascular: No peripheral edema, distal pulses palpable  Neurologic: No focal deficits, CN II-XII grossly intact  Skin: No jaundice, rashes, or lesions        Data:        Lab Results   Component Value Date     06/14/2020    Lab Results   Component Value Date    CHLORIDE 105 06/14/2020    Lab Results   Component Value Date    BUN 8 06/14/2020      Lab Results   Component Value Date    POTASSIUM 3.7 06/14/2020    Lab Results   Component Value Date    CO2 27 06/14/2020    Lab Results   Component Value Date    CR 1.04 06/14/2020        Lab Results   Component Value Date    WBC 7.4 06/14/2020    HGB 14.7 06/14/2020    HCT 44.7 06/14/2020    MCV 91 06/14/2020     06/14/2020     Lab Results   Component Value Date    WBC 7.4 06/14/2020    "

## 2020-06-22 NOTE — PROGRESS NOTES
"[facility]   Psychiatric Progress Note      Impression:     Misty Parham is a 34-year-old female admitted to [facility] on 6/14/2020.  She was admitted as a voluntary patient through [facility] ER due to depressive symptoms and suicidal ideation with a plan to hang herself.  She is currently under commitment with [facility] Sr in place.  She was hospitalized at [facility] in February - April 2020 at which time she underwent a course of 14 ECT treatments and was also hospitalized at [facility] in early May 2020.  She discharged to [facility] on May 8th.  She had been withdrawn, not attending groups at the [facility].  She made comments to [facility] staff about her intent to hang herself.  She reports conflict with her  and says they have been  for about 6 years, however her  reports she is , which patient later verified.  She also misses her children.  She saw the youngest about 2 weeks prior to admission and hasn't seen the older children in a couple months.  She reports that she didn't eat for 7 days preceding admission because \"I wanted to starve myself.\"  Since admission she has consumed some crackers.  She reports that she wasn't taking her medications regularly.  \"I take them for a couple days, then stop for a couple days because I feel like I don't deserve to be happy.\"  She denies side effects from meds and states they are helpful when she takes them regularly.  UTOX was negative.  Since admission, Rozerem, Lunesta, and Geodon were continued.  Lamictal was increased.  Seroquel was changed to XR formulation and increased.  PRN Hydroxyzine and PRN Benadryl were continued.  Mood is slightly improved.  She endorses occasional passive suicidal ideation.          Diagnoses:     Major depressive disorder, severe, with psychotic features  Generalized anxiety disorder  Borderline personality " disorder         Plan:     Medications:  Continue Lunesta 3 mg at HS.  Continue Rozerem 8 mg at HS.  Continue Benadryl 25-50 mg at HS PRN.  Continue Hydroxyzine 25-50 mg TID PRN.  Continue Geodon 60 mg BID with meals.  Increase Seroquel XR to 100 mg at HS.  Continue Lamictal 300 mg daily.  Begin Miralax 17 g BID.    Internal medicine consult for physical exam in anticipation of IRTS placement.     She is under commitment with Travis Sr.  PD has not been revoked.  She has been discharged from [facility].  Plan for discharge to [facility] or an IRTS.  She has an interview with [facility] today and they have immediate openings.  She is not welcome to return to her mother's home unless she has successfully completed one of these.        Telemedicine Visit: The patient's condition can be safely assessed and treated via synchronous audio and visual telemedicine encounter.       Start Time: 0910  Stop Time: 0918     Reason for Telemedicine Visit: COVID-19 precautions     Originating Site (Patient Location): [facility]     Distant Site (Provider Location): Provider Remote Setting     Consent:  The patient/guardian has verbally consented to: the potential risks and benefits of telemedicine (video visit) versus in person care; bill my insurance or make self-payment for services provided; and responsibility for payment of non-covered services.      Mode of Communication:  Video Conference via Skype     As the provider I attest to compliance with applicable laws and regulations related to telemedicine.      Attestation:  Patient has been seen and evaluated by me, Nissa Morris, APRN CNP  The patient was counseled on nature of illness and treatment plan/options  Care was coordinated with treatment team      Clinical Global Impressions  First:  Considering your total clinical experience with this particular patient population, how severe are the patient's symptoms at this time?: 7  "(06/15/20 0520)  Compared to the patient's condition at the START of treatment, this patient's condition is: 4 (06/15/20 0520)  Most recent:  Considering your total clinical experience with this particular patient population, how severe are the patient's symptoms at this time?: 7 (06/15/20 0520)  Compared to the patient's condition at the START of treatment, this patient's condition is: 4 (06/15/20 0520)          Interim History:     The patient's care was discussed with the treatment team and chart notes were reviewed.  Pt was documented as sleeping 7, 7 and 6.75 hours during the weekend overnight shifts.  She has been cooperative with her room lock out.  She has been attending some groups.  She reports no bowel movement x 1 week.  She tried MOM over the weekend.  Added Miralax BID.  Pt states that she feels comfortable in groups and denies social anxiety.  Affect remains blunted.  She reports occasional suicidal ideation and contracts for safety.  Continues to report poor sleep, kept awake by racing thoughts.  Discussed increasing Seroquel XR.  Her anxiety is low.  She reports feeling irritable.  \"I hold it in.\"  States she sometimes digs her fingernails into the skin on her arms when she feels irritable but does not break the skin.           Medications:     Current Facility-Administered Medications   Medication     acetaminophen (TYLENOL) tablet 650 mg     alum & mag hydroxide-simethicone (MAALOX  ES) suspension 30 mL     calcium carbonate (TUMS) chewable tablet 1,000 mg     diphenhydrAMINE (BENADRYL) tablet 25-50 mg     eszopiclone (LUNESTA) tablet 3 mg     guaiFENesin (ROBITUSSIN) 20 mg/mL solution 10 mL     hydrocortisone (CORTAID) 1 % cream     hydrOXYzine (ATARAX) tablet 25-50 mg     lamoTRIgine (LaMICtal) tablet 300 mg     loperamide (IMODIUM) capsule 2 mg     magnesium hydroxide (MILK OF MAGNESIA) suspension 30 mL     neomycin-bacitracin-polymyxin (NEOSPORIN) ointment     OLANZapine (zyPREXA) tablet 10 mg " "   Or     OLANZapine (zyPREXA) injection 10 mg     polyethylene glycol (MIRALAX) Packet 17 g     protriptyline (VIVACTIL) tablet 10 mg     QUEtiapine (SEROquel XR) 24 hr tablet 100 mg     ramelteon (ROZEREM) tablet 8 mg     rOPINIRole (REQUIP) tablet 1 mg     traZODone (DESYREL) tablet 50 mg     ziprasidone (GEODON) capsule 60 mg             Allergies:   No Known Allergies         Psychiatric Examination:     Appearance:  Alert, adequate grooming  Attitude: Cooperative however mostly passive  Eye Contact: Good  Mood:  Depressed, some improvement, occasionally irritable  Affect:  Mood congruent and blunted  Speech:  Minimal in content, low volume  Psychomotor Behavior:  No psychomotor abnormalities noted  Thought Process: Linear, not entirely logical, not tangential or circumstantial or disorganized  Associations:  No loose associations  Thought Content:  No obvious paranoia, delusions, ideas of reference, or grandiosity noted, denies auditory or visual hallucinations, endorsed occasional passive suicidal Ideations near baseline and contracts for safety, denies homicidal ideations  Insight:  Fair  Judgment:  Fair  Oriented to:  Date, time, person, and place  Attention Span and Concentration:  Intact  Recent and Remote Memory:  Fair  Language:  Appropriate based on interviewing  Fund of Knowledge:  Appropriate based on interviewing  Muscle Strength and Tone:  Normal upon visual inspection  Gait and Station:  Normal upon visual inspection           /78 (BP Location: Left arm)   Pulse 89   Temp 96.8  F (36  C) (Tympanic)   Resp 16   Ht 1.651 m (5' 5\")   Wt 99.6 kg (219 lb 9.6 oz)   LMP 06/03/2020   SpO2 98%   BMI 36.54 kg/m           Labs:     No results found for this or any previous visit (from the past 24 hour(s)).  "

## 2020-06-23 VITALS
DIASTOLIC BLOOD PRESSURE: 81 MMHG | HEIGHT: 65 IN | RESPIRATION RATE: 16 BRPM | OXYGEN SATURATION: 99 % | WEIGHT: 219.6 LBS | SYSTOLIC BLOOD PRESSURE: 115 MMHG | BODY MASS INDEX: 36.59 KG/M2 | TEMPERATURE: 97.9 F | HEART RATE: 89 BPM

## 2020-06-23 PROCEDURE — 99239 HOSP IP/OBS DSCHRG MGMT >30: CPT | Performed by: NURSE PRACTITIONER

## 2020-06-23 PROCEDURE — 25000132 ZZH RX MED GY IP 250 OP 250 PS 637: Performed by: NURSE PRACTITIONER

## 2020-06-23 RX ADMIN — ZIPRASIDONE HCL 60 MG: 60 CAPSULE ORAL at 08:17

## 2020-06-23 RX ADMIN — HYDROXYZINE HYDROCHLORIDE 25 MG: 25 TABLET, FILM COATED ORAL at 08:32

## 2020-06-23 RX ADMIN — LAMOTRIGINE 300 MG: 150 TABLET ORAL at 08:17

## 2020-06-23 RX ADMIN — POLYETHYLENE GLYCOL 3350 17 G: 17 POWDER, FOR SOLUTION ORAL at 08:17

## 2020-06-23 NOTE — PROGRESS NOTES
Prior Authorization **DENIED**    Medication: Eszopiclone 3mg tabs **DENIED**  Denial Date: 6/22/2020  Reference #: CoverMyMeds Key: [id number]  Denial Rational:     Appeal Information:     Comments: Proactive Prior Authorization. Insurance believes patient has primary insurance, but that plan termed on 4/11/20. Patient has to call [place] Medicaid or contact a  to have account updated: [phone]. Accounts usually take 24 hours to updated, and a new PA can be submitted after this takes place.  Denial Letter:      Yudy Burger CPhT  [facility] Liaison  Pronouns: She/Her/Hers    [facility]  [facility]  [address] [place] [address] [address] [address]   [email or web address]  [email or web address]   Phone: [phone]  Pager: [phone]  Fax: [phone]

## 2020-06-23 NOTE — PROGRESS NOTES
06/22/20 4690   Significant Event   Significant Event Other (see comments)  (Shift Summary)   Patient had a quiet shift.    Patient did not require seclusion/restraints to manage behavior.    Misty Parham did participate in groups and was visible in the milieu.    Visitors during this shift included none.      Other information about this shift: Pt. was present in the milieu. Staff was able to lock her out of her room. She was cooperative and pleasant with others. She mentioned that her anxiety was an 8 and her depressed at a 5..

## 2020-06-23 NOTE — PROGRESS NOTES
Attended 1 OT group. Quiet and withdrawn. Sought out individual task and worked independently. Flat affect. Good attention to details and planning. Offered minimal when approached.

## 2020-06-23 NOTE — DISCHARGE SUMMARY
"Psychiatric Discharge Summary    Misty Parham MRN# [id number]   Age: 34 year old YOB: 1985     Date of Admission:  6/14/2020  Date of Discharge:  6/23/2020  Admitting Physician:  Baron Laughlin MD  Discharge Physician:  WILLIAN Sánchez CNP (Contact: [phone])         Event Leading to Hospitalization:      From H&P 6/15/2020:    \"Suicidal ideation with a plan and I haven't eaten in 7 days.\"     Misty Parham is a 34-year-old female admitted to [address] on 6/14/2020.  She was admitted as a voluntary patient through [facility] ER due to depressive symptoms and suicidal ideation with a plan to hang herself.  She is currently under commitment with Travis Sr in place.  She was hospitalized at [facility] in February - April 2020 at which time she underwent a course of 14 ECT treatments and was also hospitalized at [facility] in early May 2020.  She discharged to [facility] on May 8th.  She had been withdrawn, not attending groups at the [facility].  She made comments to [facility] staff about her intent to hang herself.  She reports conflict with her .  They have been  for about 6 years.  She also misses her children.  She saw the youngest about 2 weeks ago and hasn't seen the older children in a couple months.  She reports that she didn't eat for 7 days preceding admission because \"I wanted to starve myself.\"  Since admission she has consumed some crackers.  She reports that she wasn't taking her medications regularly.  \"I take them for a couple days, then stop for a couple days because I feel like I don't deserve to be happy.\"  She denies side effects from meds and states they are helpful when she takes them regularly.  UTOX was negative.    -- Depressive episode: mood is depressed, characterized as severe, accompanied with low energy, low appetite, anhedonia, feelings of hopelessness, helplessness, worthlessness " and guilt, endorses suicidal ideation and contracts for safety on the unit, some impairment in concentration, endorses middle insomnia  -- Trixie:  denies symptoms  -- Psychosis:  denies symptoms  -- Anxiety: endorses anxiety, moderate intensity, racing thoughts, irritability, social anxiety, denies muscle tension, denies panic attacks  -- PTSD: denies symptoms  -- OCD: denies  symptoms  -- Eating disorder: denies symptoms  Denies homicidal ideation    See full admission note by Hina Morris NP 6/15/2020 for details.           Diagnoses:     Major depressive disorder, severe, with psychotic features  Generalized anxiety disorder  Borderline personality disorder         Labs:      Ref. Range 6/14/2020 00:00 6/14/2020 11:42 6/14/2020 15:04 6/16/2020 19:05   Sodium Latest Ref Range: 133 - 144 mmol/L 137      Potassium Latest Ref Range: 3.4 - 5.3 mmol/L 3.7      Chloride Latest Ref Range: 94 - 109 mmol/L 105      Carbon Dioxide Latest Ref Range: 20 - 32 mmol/L 27      Urea Nitrogen Latest Ref Range: 7 - 30 mg/dL 8      Creatinine Latest Ref Range: 0.52 - 1.04 mg/dL 1.04      GFR Estimate Latest Ref Range: >60 mL/min/1.73_m2 69      GFR Estimate If Black Latest Ref Range: >60 mL/min/1.73_m2 80      Calcium Latest Ref Range: 8.5 - 10.1 mg/dL 9.3      Anion Gap Latest Ref Range: 3 - 14 mmol/L 5      Glucose Latest Ref Range: 70 - 99 mg/dL 103 (H)      WBC Latest Ref Range: 4.0 - 11.0 10e9/L 7.4      Hemoglobin Latest Ref Range: 11.7 - 15.7 g/dL 14.7      Hematocrit Latest Ref Range: 35.0 - 47.0 % 44.7      Platelet Count Latest Ref Range: 150 - 450 10e9/L 311      RBC Count Latest Ref Range: 3.8 - 5.2 10e12/L 4.91      MCV Latest Ref Range: 78 - 100 fl 91      MCH Latest Ref Range: 26.5 - 33.0 pg 29.9      MCHC Latest Ref Range: 31.5 - 36.5 g/dL 32.9      RDW Latest Ref Range: 10.0 - 15.0 % 13.1      Diff Method Unknown Automated Method      % Neutrophils Latest Units: % 72.9      % Lymphocytes Latest Units: % 18.4      %  Monocytes Latest Units: % 6.6      % Eosinophils Latest Units: % 1.4      % Basophils Latest Units: % 0.4      % Immature Granulocytes Latest Units: % 0.3      Nucleated RBCs Latest Ref Range: 0 /100 0      Absolute Neutrophil Latest Ref Range: 1.6 - 8.3 10e9/L 5.4      Absolute Lymphocytes Latest Ref Range: 0.8 - 5.3 10e9/L 1.4      Absolute Monocytes Latest Ref Range: 0.0 - 1.3 10e9/L 0.5      Absolute Eosinophils Latest Ref Range: 0.0 - 0.7 10e9/L 0.1      Absolute Basophils Latest Ref Range: 0.0 - 0.2 10e9/L 0.0      Abs Immature Granulocytes Latest Ref Range: 0 - 0.4 10e9/L 0.0      Absolute Nucleated RBC Unknown 0.0      COVID-19 Virus PCR to [facility] - Source Unknown  Nasopharyngeal     COVID-19 Virus PCR to [facility] - Result Unknown  Test received-See reflex to IDDL test SARS CoV2 (COVID-19) Virus RT-PCR     SARS-CoV-2 Virus Specimen Source Unknown  Nasopharyngeal     SARS-CoV-2 PCR Result Unknown  NEGATIVE     Lamotrigine Level Latest Ref Range: 2.5 - 15.0 ug/mL 4.6   6.5   Amphetamine Qual Urine Latest Ref Range: NEG^Negative    Negative    Cocaine Qual Urine Latest Ref Range: NEG^Negative    Negative    Opiates Qualitative Urine Latest Ref Range: NEG^Negative    Negative    Cannabinoids Qual Urine Latest Ref Range: NEG^Negative    Negative    Barbiturates Qual Urine Latest Ref Range: NEG^Negative    Negative    Pcp Qual Urine Latest Ref Range: NEG^Negative    Negative    Benzodiazepine Qual Urine Latest Ref Range: NEG^Negative    Negative             Consults:     Consultation during this admission received from internal medicine 6/22/2020:    Assessment & Recommendations:    Misty Malone is a 34 year old female with history of chronic diarrhea, depression, anxiety and borderline personality disorder who was admitted to [facility] with SI. Medicine consulted for H&P prior to placement in IRTS facility     Depression with SI, anxiety, borderline personality disorder: Plan to hang self  - Management per psych       Chronic diarrhea/constipation: Patient thinks 2/2 psych meds. Stable and at BL. Continue prn bowel meds     Currently, medically stable and internal medicine will sign off.          Hospital Course:     Misty Parham was admitted to [facility] with attending Baron Laughlin MD, under the direct care of Hina Morris as a voluntary patient.  She is under civil commitment with Bhargav and Travis Sr until 5/26/2021.  Her provisional discharge was not revoked.  The patient was placed under status 15 (15 minute checks) to ensure patient safety.     ECT per Travis Sr was considered, however review of her chart indicated that her most recent two courses of ECT yielded little benefit aside from some improvements in mood on the actual day of treatment.      Rozerem 8 mg at bedtime was continued.  Lunesta 3 mg at bedtime was continued; the treatment team later learned this was not filled following her most recent hospital discharge and a PA was initiated for outpatient coverage.  Geodon 60 mg BID was continued.  Vivactil 10 mg daily was continued.  Lamotrigine level upon admission was 4.6; a more accurate level was subsequently obtained 12 hours after her last dose of Lamictal 200 mg and was 6.5.  Lamictal was increased to 300 mg daily.  Seroquel was changed to XR formulation to better target insomnia, anxiety and depression and was increased from 50 mg to 100 mg.  PRN Hydroxyzine was continued.  She tolerated her medications well.    Misty Parham initially spent most of her time in her room and did not attend groups.  She stated that social anxiety prevented her from attending groups.  The treatment team discussed with her that lack of participation was a reason for her discharge from [facility] and would be a barrier to discharge and placement at another IRTS facility.  A room lock out was initiated from 9:30 AM to 12 PM, 1 PM to 3 PM and 5 PM to 9 PM daily.  She began attending groups and reported  no issues with social anxiety.  She presented as very passive overall and took little responsibility in in efforts to facilitate her recovery.  She was dishonest with treatment team members, for example stating that her mother said she would allow her to reside with her when no such conversation ever occurred.  The treatment team later determined that her mother stated she would allow her to stay in her home only after successful completion of an IRTS program.  She also said she was  and working on reconciling with her  when in fact they had been  for years and he was considering moving to [facility].      The patient's symptoms of depression improved somewhat.  She reported occasional passive suicidal ideation and denied intent or plan.  Anxiety was low to moderate.  Sleep was fair.  Her appetite improved and she ate well during her hospitalization and gained 8 pounds.  There was no evidence of psychosis.       Misty Parham was released to [facility].  She remains on a provisional discharge.  At the time of discharge Misty Parham was determined to not be a danger to herself or others.          Discharge Medications:      Misty Parham   Home Medication Instructions RAFAEL:[id number]    Printed on:06/23/20 5605   Medication Information                      eszopiclone (LUNESTA) 3 MG tablet  Take 1 tablet (3 mg) by mouth At Bedtime  #30 ordered, 0 refills           hydrOXYzine (ATARAX) 25 MG tablet  Take 1 tablet (25 mg) by mouth 2 times daily as needed for anxiety             lamoTRIgine (LAMICTAL) 150 MG tablet  Take 2 tablets (300 mg) by mouth daily             protriptyline (VIVACTIL) 10 MG tablet  Take 1 tablet (10 mg) by mouth daily (with dinner)             QUEtiapine (SEROQUEL XR) 50 MG TB24 24 hr tablet  Take 2 tablets (100 mg) by mouth every evening             ramelteon (ROZEREM) 8 MG tablet  Take 1 tablet (8 mg) by mouth At Bedtime             rOPINIRole (REQUIP) 1 MG  "tablet  Take 1 tablet (1 mg) by mouth At Bedtime             ziprasidone (GEODON) 60 MG capsule  Take 1 capsule (60 mg) by mouth 2 times daily (with meals)                      Psychiatric Examination:     Appearance:  Alert, adequate grooming  Attitude: Cooperative however mostly passive  Eye Contact: Good  Mood:  Depressed, some improvement, occasionally irritable, anxious about transition to IRTS  Affect:  Mood congruent and blunted  Speech:  Minimal in content, low volume  Psychomotor Behavior:  No psychomotor abnormalities noted  Thought Process: Linear, not entirely logical, not tangential or circumstantial or disorganized  Associations:  No loose associations  Thought Content:  No obvious paranoia, delusions, ideas of reference, or grandiosity noted, denies auditory or visual hallucinations, endorsed occasional passive suicidal Ideations near baseline and contracts for safety outside the hospital, denies homicidal ideations  Insight:  Fair  Judgment:  Fair  Oriented to:  Date, time, person, and place  Attention Span and Concentration:  Intact  Recent and Remote Memory:  Fair  Language:  Appropriate based on interviewing  Fund of Knowledge:  Appropriate based on interviewing  Muscle Strength and Tone:  Normal upon visual inspection  Gait and Station:  Normal upon visual inspection         /77   Pulse 86   Temp 97.5  F (36.4  C) (Oral)   Resp 16   Ht 1.651 m (5' 5\")   Wt 99.6 kg (219 lb 9.6 oz)   LMP 06/03/2020   SpO2 100%   BMI 36.54 kg/m               Discharge Plan:     Per Discharge AVS:    You were admitted on 6/14/2020 due to suicidal ideations.  You were treated by Hina Morris NP and discharged on 6/23/2020 from [facility] to [facility] at [address] [place] [address].    You were previously  committed to the [facility] and the [employer] of [employer]; and your provisional discharge was not revoked while you were here.  Please " keep in touch with your [facility] : Samreen at [phone] for expectations given your commitment history.    Health Care Follow-up Appointments:     PCP - Kathleen Spencer - [facility]     Psychiatry - Dr. Hernández - [facility]  Appointment scheduled for July 2, 2020 @ 10am via telehealth / phone  [facility]  [address] [place]  Ph:  [phone]    Fax:  [phone]     Therapy - Anderson Moss, [facility]  Follow up appointment scheduled on 6/29/2020 @ 12:30 via phone  [facility]  [address] [place]  Ph:  [phone]    Fax:  [phone]     [facility]  - Samreen Machuca ([phone])        A 30-day supply of medications plus one refill was e-prescribed to [facility] in [place].  She was advised to take her medications as prescribed and to abstain from alcohol and illicit substances.      A PA for Lunesta was pending at the time of discharge.  Following her discharge, the discharge pharmacy liaison indicated that her primary insurance termed 4/11/2020 but that [place] Medicaid was asking her primary insurance to be billed for Lunesta first.  The CTC contacted [facility] and spoke with the RN, giving her the phone number for the [place] Medicaid help desk so the patient can update her account and resolve this issue, as well as the contact info for the discharge pharmacy liaison who should be notified after the issue is resolved so that the PA can be resubmitted.        Telemedicine Visit: The patient's condition can be safely assessed and treated via synchronous audio and visual telemedicine encounter.       Start Time: 0737  Stop Time: 0744     Reason for Telemedicine Visit: COVID-19 precautions     Originating Site (Patient Location): [facility]     Distant Site (Provider Location): Provider Remote Setting     Consent:  The patient/guardian has verbally consented to: the potential  risks and benefits of telemedicine (video visit) versus in person care; bill my insurance or make self-payment for services provided; and responsibility for payment of non-covered services.      Mode of Communication:  Video Conference via Skype     As the provider I attest to compliance with applicable laws and regulations related to telemedicine.      Attestation:  Patient has been seen and evaluated by me, WILLIAN Sánchez CNP  Discharge time > 30 minutes      Clinical Global Impressions  First:  Considering your total clinical experience with this particular patient population, how severe are the patient's symptoms at this time?: 7 (06/15/20 0520)  Compared to the patient's condition at the START of treatment, this patient's condition is: 4 (06/15/20 0520)  Most recent:  Considering your total clinical experience with this particular patient population, how severe are the patient's symptoms at this time?: 6 (06/23/20 0515)  Compared to the patient's condition at the START of treatment, this patient's condition is: 3 (06/23/20 0515)

## 2020-06-23 NOTE — PROGRESS NOTES
06/22/20 2005   Group Therapy Session   Group Attendance attended group session   Total Time (minutes) 50   Group Type psychotherapeutic   Patient Participation/Contribution cooperative with task;discussed personal experience with topic;listened actively   Pt participated in psychotherapy group where patients were asked to draw a picture of something that makes them feel safe/secure, then process with the group.    Misty reports feeling nervous. She presents as anxious, quiet, with sad affect.  She participated in the activity, drawing a picture of her  as someone with whom she feels secure. Eye gaze mostly downward. Closed body language.

## 2020-06-24 ENCOUNTER — TELEPHONE (OUTPATIENT)
Dept: PHARMACY | Facility: OTHER | Age: 35
End: 2020-06-24

## 2020-06-24 NOTE — PROGRESS NOTES
Received message from Samreen Machuca at [facility] case management. Phone: [phone]. Requested paperwork. CTC sent the H&P and Discharge summary to her at fax: [phone].    Called, left message that information has been sent.

## 2020-06-24 NOTE — TELEPHONE ENCOUNTER
MTM referral from: Transitions of Care (recent hospital discharge or ED visit)    MTM referral outreach attempt #1 on June 24, 2020 at 8:33 AM      Outcome: Patient is not interested at this time because they are a [facility] patient, will route to MTM Pharmacist/Provider as an FYI. Thank you for the referral.     Namrata Rouse, MTM Coordinator

## 2020-07-15 NOTE — PROGRESS NOTES
Still have not heard back from patient on status of primary insurance. (Need primary insurance if any--if no primary insurance, pt needs to have updated with  so PA can be processed appropriately.)    Closing encounter.      Yudy Burger CPhT  [place] Discharge Pharmacy Liaison  Pronouns: She/Her/Hers    [facility]  [address] [facility]  [address] [place] [address] [address], [place] [address]   [email or web address]  [email or web address]   Phone: [phone]  Pager: [phone]  Fax: [phone]

## 2020-08-13 ENCOUNTER — HOSPITAL ENCOUNTER (EMERGENCY)
Facility: CLINIC | Age: 35
Discharge: HOME | End: 2020-08-13
Attending: EMERGENCY MEDICINE | Admitting: EMERGENCY MEDICINE
Payer: COMMERCIAL

## 2020-08-13 VITALS
RESPIRATION RATE: 15 BRPM | TEMPERATURE: 98.2 F | DIASTOLIC BLOOD PRESSURE: 86 MMHG | SYSTOLIC BLOOD PRESSURE: 126 MMHG | OXYGEN SATURATION: 99 % | HEART RATE: 89 BPM

## 2020-08-13 DIAGNOSIS — R45.851 SUICIDAL THOUGHTS: ICD-10-CM

## 2020-08-13 PROCEDURE — 99285 EMERGENCY DEPT VISIT HI MDM: CPT | Mod: 25

## 2020-08-13 PROCEDURE — 90791 PSYCH DIAGNOSTIC EVALUATION: CPT

## 2020-08-13 NOTE — ED TRIAGE NOTES
Pt coming from group home. [facility]. States suicidal x3 days. Plan to shoot herself or hang herself. Not on transport hold.

## 2020-08-13 NOTE — ED NOTES
Patient states that she's not suicidal, states 'if I can leave here it'll all be fine'. Patient has flat affect when she states this. Patient states she was suicidal PTA with plan, and doesn't state that she won't be suicidal when she leaves here. Pt changed into scrubs and placed on LENCHO for her safety.

## 2020-08-13 NOTE — ED PROVIDER NOTES
History     Chief Complaint:  Suicidal ideation    HPI   Misty Parham is a 35 year old female with a history of depression and suicidal behavior who presents to the emergency department for evaluation of suicidal ideation.  The patient was sent from the [facility] home for concern of suicidal thoughts.  The plan was to shoot herself or hang herself.  On arrival, the patient states that she is not suicidal.  She states that actually she is felt suicidal for the past few days and told somebody but since this morning she has not felt this way.  She states that if she can leave it will be fine.  She states that she will not harm herself.  She states that she does not have access to the means to harm herself anyway.  She denies doing anything to harm herself prior to arrival.  No recent illness or injuries.    No fever, cough, shortness of breath, loss of taste or smell, vomiting or diarrhea.  No exposure to anyone with known COVID or COVID-like symptoms.  No COVID testing is pending at this time.      Allergies:  No Known Drug Allergies    Medications:    Lunesta  Atarax  Lamictal  Vivactil  Seroquel  Rozerem  Requip  Geodon    Past Medical History:    Depressive disorder  Borderline personality disorder  Suicidal behavior    Past Surgical History:    Cholecystectomy    Family History:    Father: diabetes    Social History:  Smoking Status: Never  Smokeless Tobacco: Never  Alcohol Use: Not currently  Drug Use: Never  Marital Status:  Legally       Review of Systems  As noted per HPI.  Remainder of a 10 point review of systems was negative.      Physical Exam   Vitals:  Patient Vitals for the past 24 hrs:   BP Temp Temp src Pulse Resp SpO2   08/13/20 1740 -- -- -- -- -- 100 %   08/13/20 1735 113/82 98.2  F (36.8  C) Oral 92 16 --       Physical Exam  General: Well-nourished, no acute distress  Eyes: PERRL, conjunctivae pink no scleral icterus or conjunctival injection  ENT:  Moist mucus membranes  Respiratory:   No respiratory distress  CV: Normal rate   Skin: Warm, dry.  No rashes or petechiae  Musculoskeletal: No peripheral edema or calf tenderness  Neuro: Alert and oriented to person/place/time  Physical appearance, attire: Appears stated age. Hygiene well groomed. Eye contact at examiner. Speech regular, speech volume regular, speech quality fluid. Cognitive, perceptual reality based. Cognition, memory intact, judgment intact, insight intact. Orientation to time, place, person and situation. Thought logical. Hallucinations: None. General behavioral tone: Cooperative. Psychomotor activity: No problem noted. Gait: No problem. Mood normal. Affect flat.     Emergency Department Course     Emergency Department Course:  Past medical records, nursing notes, and vitals reviewed.    1726 The patient was placed on a hold by the RN.    1739 I performed an exam of the patient as documented above.     1930 I rechecked the patient and discussed the results of her workup thus far.     Findings and plan explained to the Patient. Patient discharged home with instructions regarding supportive care, medications, and reasons to return. The importance of close follow-up was reviewed.      Impression & Plan      Medical Decision Making:  Misty Parham is a 35 year old female who presents for evaluation of suicidal thoughts.  This patient does have a history of previous psychiatric illness.  The patient has had increasing thoughts of self harm.   Although the patient has had increased suicidal thoughts, they have not had any actions of self harms.  They no signs at this point of suicide attempt or ingestion she is able to contract for safety, and is in a safe group home without access to lethal means.  The DEC  evaluated the patient and felt that she would be safe for discharge back.  She discussed with the group home staff who felt comfortable with the plan but had wanted her to be evaluated.  They were able to come to the emergency  department to pick the patient up.  At this time, with reasonable clinical confidence, I feel she is safe for discharge back to her group home.    Diagnosis:    ICD-10-CM    1. Suicidal thoughts  R45.851        Disposition:  discharged to home    Discharge Medications:  None      I, Gianfranco Dawn, am serving as a scribe on 8/13/2020 at 5:35 PM to personally document services performed by Shanika Cheatham MD based on my observations and the provider's statements to me.    Gianfranco Dawn  8/13/2020    EMERGENCY DEPARTMENT       Shanika Cheatham MD  08/13/20 9270

## 2020-08-13 NOTE — ED NOTES
Bed: ED16  Expected date: 8/13/20  Expected time: 4:58 PM  Means of arrival: Ambulance  Comments:  730 34f suicidal

## 2020-08-13 NOTE — PROGRESS NOTES
Spoke with Drew from [facility] whom will try to contact the pt's cm and have them call the ED so writer can speak with them.

## 2020-08-13 NOTE — ED AVS SNAPSHOT
Emergency Department  [address] [facility] [address]  Phone:  [phone]  Fax:  [phone]                                    Misty Parham   MRN: [id number]    Department:   Emergency Department   Date of Visit:  8/13/2020           After Visit Summary Signature Page    I have received my discharge instructions, and my questions have been answered. I have discussed any challenges I see with this plan with the nurse or doctor.    ..........................................................................................................................................  Patient/Patient Representative Signature      ..........................................................................................................................................  Patient Representative Print Name and Relationship to Patient    ..................................................               ................................................  Date                                   Time    ..........................................................................................................................................  Reviewed by Signature/Title    ...................................................              ..............................................  Date                                               Time          22EPIC Rev 08/18

## 2020-08-14 NOTE — DISCHARGE INSTRUCTIONS
Please follow up with your current outpatient providers as needed, recommended, scheduled.     Safety Plan Details  Please refer to the plan below for strategies and resources you discussed with the licensed behavioral health clinician.  Warning signs that a crisis may be developing:  -Hearing voices  -Not sleeping  -Family conflict  -Not taking medications  -Not engaging in treatment    Things that make me feel better:  -Listening to music  -Talking to, and seeing kids    People and social settings that provide distraction:  -Kids  -Sisters    People whom I can ask for help:  -Sisters  -Staff  -Therapist    Professionals or agencies I can contact during a crisis:  -[facility] Crisis: [phone]  -Crisis Text Line: text [place] or HOME to [phone]    Ways to improve my environment or surroundings:  -Engage in treatment  -Focus on getting better for kids  I  f I Am Having Difficulty Or Am In Crisis, I Will:  -Contact My Established Care Providers   -Go To The Nearest Emergency Department  -Call Suicide Hotline (1-576.533.6507)   - Call 9-1-1

## 2020-08-14 NOTE — ED NOTES
Patient sitting on cart talking with DEC evaluator on tele-dec. Respirations are regular and unlabored. Patient repositions self independently on cart. Continue to monitor.

## 2020-08-14 NOTE — ED NOTES
This RN called patient's residence ([phone]) and spoke with caregiver, Marietta Conroy.     Caregiver reports that  is Samreen Machuca (contact #[phone], alternate contact #[phone]).     Therapist: Anderson Nam #[phone]

## 2020-08-14 NOTE — ED NOTES
RN reviewed discharge instructions and safety plan with patient. Patient verbalized understanding. Denies any questions/concerns at present. Patient escorted through ED door 5. Patient left in vehicle with residence workerBarrington.

## 2020-12-16 NOTE — PLAN OF CARE
"Pt is visible in the milieu and appropriate with staff and peers, but is mostly socially isolative. She has a flat/blunted affect and continues to report severe depression. She states her anxiety has improved since admission and did not have much anxiety today at all. She states her mood has been sad today because she is missing her children whom she has not had much contact with since February. She denied thoughts to hurt herself. When asked, she states she always has thoughts of death/suicide, but denies thinking of plans and contracts for safety. She states she feels safe on the unit. She reports she was very groggy today and feels it may be from her Seroquel being changed to 24 hour version. She states she has not much of an appetite, but has been making herself eat. She reports he slept well last night, but prior to that, she states she hasn't been sleeping well. She denies physical pain. She told writer she wants to go home, but states she may be \"sent to a group home.\"    Pt's BP dropped and HR increased from sitting to standing. She reported mild dizziness upon standing. She was offered Crystal Light and was encouraged to drink fluids. By  her vitals were stable at Sitin/86 with pulse of 86 and Standin/80 with pulse of 88.   " HEARING AID FITTING    Audiology    :  Phonak Model/style/color: [place] M50-IL in Lava Red  Date of purchase/dispense date: 2020   REPAIR WARRANTY EXPIRATION DATE: 2026   Loss and Damage expiration date: 2026  Serial # right: [id number]  //  Serial # left: [id number]  Payor: Commercial: [employer] HMO    Battery size: Rechargable    Earmold : [employer]   ---Earmold style and material: [id number] in skeleton & red/blue swirls  ---TubinT/TRS   Accessory: Phonak  ---Accessory serial # [id number] // PartnerMic   ---Accessory warranty expiration date: 2022   used: Lat49    Updated by FROILAN Bucklye 

## 2021-01-15 ENCOUNTER — HEALTH MAINTENANCE LETTER (OUTPATIENT)
Age: 36
End: 2021-01-15

## 2021-02-10 ENCOUNTER — HOSPITAL ENCOUNTER (EMERGENCY)
Facility: CLINIC | Age: 36
Discharge: PSYCHIATRIC OR SUBSTANCE USE FACILITY | End: 2021-02-10
Attending: EMERGENCY MEDICINE | Admitting: EMERGENCY MEDICINE
Payer: COMMERCIAL

## 2021-02-10 ENCOUNTER — TELEPHONE (OUTPATIENT)
Dept: BEHAVIORAL HEALTH | Facility: CLINIC | Age: 36
End: 2021-02-10

## 2021-02-10 ENCOUNTER — HOSPITAL ENCOUNTER (INPATIENT)
Facility: CLINIC | Age: 36
LOS: 56 days | Discharge: ANOTHER HOSPITAL | End: 2021-04-07
Attending: PSYCHIATRY & NEUROLOGY | Admitting: PSYCHIATRY & NEUROLOGY
Payer: COMMERCIAL

## 2021-02-10 VITALS
OXYGEN SATURATION: 98 % | HEART RATE: 89 BPM | TEMPERATURE: 98.2 F | SYSTOLIC BLOOD PRESSURE: 127 MMHG | RESPIRATION RATE: 22 BRPM | WEIGHT: 240 LBS | BODY MASS INDEX: 39.99 KG/M2 | DIASTOLIC BLOOD PRESSURE: 93 MMHG | HEIGHT: 65 IN

## 2021-02-10 DIAGNOSIS — K59.09 OTHER CONSTIPATION: ICD-10-CM

## 2021-02-10 DIAGNOSIS — R45.851 SUICIDAL IDEATION: ICD-10-CM

## 2021-02-10 DIAGNOSIS — R68.84 JAW PAIN: ICD-10-CM

## 2021-02-10 DIAGNOSIS — R44.3 HALLUCINATIONS: ICD-10-CM

## 2021-02-10 DIAGNOSIS — F33.3 SEVERE EPISODE OF RECURRENT MAJOR DEPRESSIVE DISORDER, WITH PSYCHOTIC FEATURES (H): Primary | ICD-10-CM

## 2021-02-10 DIAGNOSIS — F32.3 MAJOR DEPRESSIVE DISORDER, SINGLE EPISODE, SEVERE WITH PSYCHOTIC FEATURES (H): ICD-10-CM

## 2021-02-10 DIAGNOSIS — F32.A DEPRESSION, UNSPECIFIED DEPRESSION TYPE: ICD-10-CM

## 2021-02-10 DIAGNOSIS — F41.1 GENERALIZED ANXIETY DISORDER: ICD-10-CM

## 2021-02-10 DIAGNOSIS — F60.3 BORDERLINE PERSONALITY DISORDER (H): ICD-10-CM

## 2021-02-10 PROBLEM — R45.89 SUICIDAL BEHAVIOR: Status: ACTIVE | Noted: 2020-02-18

## 2021-02-10 LAB
ALBUMIN SERPL-MCNC: 4 G/DL (ref 3.4–5)
ALP SERPL-CCNC: 80 U/L (ref 40–150)
ALT SERPL W P-5'-P-CCNC: 26 U/L (ref 0–50)
AMPHETAMINES UR QL SCN: NEGATIVE
ANION GAP SERPL CALCULATED.3IONS-SCNC: 6 MMOL/L (ref 3–14)
APAP SERPL-MCNC: <2 MG/L (ref 10–20)
AST SERPL W P-5'-P-CCNC: 22 U/L (ref 0–45)
BARBITURATES UR QL: NEGATIVE
BASOPHILS # BLD AUTO: 0 10E9/L (ref 0–0.2)
BASOPHILS NFR BLD AUTO: 0.3 %
BENZODIAZ UR QL: NEGATIVE
BILIRUB SERPL-MCNC: 0.3 MG/DL (ref 0.2–1.3)
BUN SERPL-MCNC: 12 MG/DL (ref 7–30)
CALCIUM SERPL-MCNC: 9.8 MG/DL (ref 8.5–10.1)
CANNABINOIDS UR QL SCN: NEGATIVE
CHLORIDE SERPL-SCNC: 108 MMOL/L (ref 94–109)
CO2 SERPL-SCNC: 24 MMOL/L (ref 20–32)
COCAINE UR QL: NEGATIVE
CREAT SERPL-MCNC: 0.73 MG/DL (ref 0.52–1.04)
DIFFERENTIAL METHOD BLD: ABNORMAL
EOSINOPHIL # BLD AUTO: 0 10E9/L (ref 0–0.7)
EOSINOPHIL NFR BLD AUTO: 0 %
ERYTHROCYTE [DISTWIDTH] IN BLOOD BY AUTOMATED COUNT: 15.2 % (ref 10–15)
ETHANOL SERPL-MCNC: <0.01 G/DL
GFR SERPL CREATININE-BSD FRML MDRD: >90 ML/MIN/{1.73_M2}
GLUCOSE SERPL-MCNC: 107 MG/DL (ref 70–99)
HCG UR QL: NEGATIVE
HCT VFR BLD AUTO: 44.6 % (ref 35–47)
HGB BLD-MCNC: 14.3 G/DL (ref 11.7–15.7)
IMM GRANULOCYTES # BLD: 0 10E9/L (ref 0–0.4)
IMM GRANULOCYTES NFR BLD: 0.3 %
INR PPP: 1.05 (ref 0.86–1.14)
INTERPRETATION ECG - MUSE: NORMAL
LABORATORY COMMENT REPORT: NORMAL
LYMPHOCYTES # BLD AUTO: 1.4 10E9/L (ref 0.8–5.3)
LYMPHOCYTES NFR BLD AUTO: 12.5 %
MCH RBC QN AUTO: 28.7 PG (ref 26.5–33)
MCHC RBC AUTO-ENTMCNC: 32.1 G/DL (ref 31.5–36.5)
MCV RBC AUTO: 89 FL (ref 78–100)
MONOCYTES # BLD AUTO: 0.6 10E9/L (ref 0–1.3)
MONOCYTES NFR BLD AUTO: 5.1 %
NEUTROPHILS # BLD AUTO: 9.4 10E9/L (ref 1.6–8.3)
NEUTROPHILS NFR BLD AUTO: 81.8 %
NRBC # BLD AUTO: 0 10*3/UL
NRBC BLD AUTO-RTO: 0 /100
OPIATES UR QL SCN: NEGATIVE
PCP UR QL SCN: NEGATIVE
PLATELET # BLD AUTO: 381 10E9/L (ref 150–450)
POTASSIUM SERPL-SCNC: 3.9 MMOL/L (ref 3.4–5.3)
PROT SERPL-MCNC: 8.1 G/DL (ref 6.8–8.8)
RBC # BLD AUTO: 4.99 10E12/L (ref 3.8–5.2)
SALICYLATES SERPL-MCNC: <2 MG/DL
SARS-COV-2 RNA RESP QL NAA+PROBE: NEGATIVE
SODIUM SERPL-SCNC: 138 MMOL/L (ref 133–144)
SPECIMEN SOURCE: NORMAL
WBC # BLD AUTO: 11.5 10E9/L (ref 4–11)

## 2021-02-10 PROCEDURE — 96361 HYDRATE IV INFUSION ADD-ON: CPT

## 2021-02-10 PROCEDURE — 80307 DRUG TEST PRSMV CHEM ANLYZR: CPT | Performed by: EMERGENCY MEDICINE

## 2021-02-10 PROCEDURE — 96374 THER/PROPH/DIAG INJ IV PUSH: CPT

## 2021-02-10 PROCEDURE — 85025 COMPLETE CBC W/AUTO DIFF WBC: CPT | Performed by: EMERGENCY MEDICINE

## 2021-02-10 PROCEDURE — 250N000013 HC RX MED GY IP 250 OP 250 PS 637: Performed by: EMERGENCY MEDICINE

## 2021-02-10 PROCEDURE — 80143 DRUG ASSAY ACETAMINOPHEN: CPT | Performed by: EMERGENCY MEDICINE

## 2021-02-10 PROCEDURE — 99285 EMERGENCY DEPT VISIT HI MDM: CPT | Mod: 25

## 2021-02-10 PROCEDURE — 80053 COMPREHEN METABOLIC PANEL: CPT | Performed by: EMERGENCY MEDICINE

## 2021-02-10 PROCEDURE — 82077 ASSAY SPEC XCP UR&BREATH IA: CPT | Performed by: EMERGENCY MEDICINE

## 2021-02-10 PROCEDURE — 90791 PSYCH DIAGNOSTIC EVALUATION: CPT

## 2021-02-10 PROCEDURE — 124N000002 HC R&B MH UMMC

## 2021-02-10 PROCEDURE — 87635 SARS-COV-2 COVID-19 AMP PRB: CPT | Performed by: EMERGENCY MEDICINE

## 2021-02-10 PROCEDURE — 250N000013 HC RX MED GY IP 250 OP 250 PS 637: Performed by: PSYCHIATRY & NEUROLOGY

## 2021-02-10 PROCEDURE — 81025 URINE PREGNANCY TEST: CPT | Performed by: EMERGENCY MEDICINE

## 2021-02-10 PROCEDURE — 250N000011 HC RX IP 250 OP 636: Performed by: EMERGENCY MEDICINE

## 2021-02-10 PROCEDURE — 85610 PROTHROMBIN TIME: CPT | Performed by: EMERGENCY MEDICINE

## 2021-02-10 PROCEDURE — 80179 DRUG ASSAY SALICYLATE: CPT | Performed by: EMERGENCY MEDICINE

## 2021-02-10 PROCEDURE — 93005 ELECTROCARDIOGRAM TRACING: CPT

## 2021-02-10 PROCEDURE — C9803 HOPD COVID-19 SPEC COLLECT: HCPCS

## 2021-02-10 PROCEDURE — 258N000003 HC RX IP 258 OP 636: Performed by: EMERGENCY MEDICINE

## 2021-02-10 RX ORDER — VENLAFAXINE HYDROCHLORIDE 150 MG/1
150 CAPSULE, EXTENDED RELEASE ORAL DAILY
Status: ON HOLD | COMMUNITY
End: 2021-04-07

## 2021-02-10 RX ORDER — TRAZODONE HYDROCHLORIDE 50 MG/1
50 TABLET, FILM COATED ORAL
Status: DISCONTINUED | OUTPATIENT
Start: 2021-02-10 | End: 2021-04-07 | Stop reason: HOSPADM

## 2021-02-10 RX ORDER — QUETIAPINE 200 MG/1
200 TABLET, FILM COATED, EXTENDED RELEASE ORAL AT BEDTIME
Status: ON HOLD | COMMUNITY
End: 2021-04-07

## 2021-02-10 RX ORDER — OLANZAPINE 5 MG/1
5 TABLET ORAL AT BEDTIME
Status: ON HOLD | COMMUNITY
End: 2021-04-07

## 2021-02-10 RX ORDER — MAGNESIUM HYDROXIDE/ALUMINUM HYDROXICE/SIMETHICONE 120; 1200; 1200 MG/30ML; MG/30ML; MG/30ML
30 SUSPENSION ORAL EVERY 4 HOURS PRN
Status: DISCONTINUED | OUTPATIENT
Start: 2021-02-10 | End: 2021-04-07 | Stop reason: HOSPADM

## 2021-02-10 RX ORDER — MAGNESIUM HYDROXIDE/ALUMINUM HYDROXICE/SIMETHICONE 120; 1200; 1200 MG/30ML; MG/30ML; MG/30ML
30 SUSPENSION ORAL ONCE
Status: COMPLETED | OUTPATIENT
Start: 2021-02-10 | End: 2021-02-10

## 2021-02-10 RX ORDER — OLANZAPINE 10 MG/2ML
10 INJECTION, POWDER, FOR SOLUTION INTRAMUSCULAR 3 TIMES DAILY PRN
Status: DISCONTINUED | OUTPATIENT
Start: 2021-02-10 | End: 2021-04-07 | Stop reason: HOSPADM

## 2021-02-10 RX ORDER — AMOXICILLIN 250 MG
1 CAPSULE ORAL 2 TIMES DAILY PRN
Status: DISCONTINUED | OUTPATIENT
Start: 2021-02-10 | End: 2021-02-17

## 2021-02-10 RX ORDER — OLANZAPINE 10 MG/1
10 TABLET ORAL 3 TIMES DAILY PRN
Status: DISCONTINUED | OUTPATIENT
Start: 2021-02-10 | End: 2021-04-07 | Stop reason: HOSPADM

## 2021-02-10 RX ORDER — HYDROXYZINE HYDROCHLORIDE 25 MG/1
25 TABLET, FILM COATED ORAL EVERY 4 HOURS PRN
Status: DISCONTINUED | OUTPATIENT
Start: 2021-02-10 | End: 2021-04-07 | Stop reason: HOSPADM

## 2021-02-10 RX ORDER — LORAZEPAM 1 MG/1
1 TABLET ORAL EVERY 6 HOURS PRN
Status: ON HOLD | COMMUNITY
End: 2021-02-11

## 2021-02-10 RX ORDER — ACETAMINOPHEN 325 MG/1
650 TABLET ORAL EVERY 4 HOURS PRN
Status: DISCONTINUED | OUTPATIENT
Start: 2021-02-10 | End: 2021-04-07 | Stop reason: HOSPADM

## 2021-02-10 RX ORDER — ONDANSETRON 2 MG/ML
4 INJECTION INTRAMUSCULAR; INTRAVENOUS ONCE
Status: COMPLETED | OUTPATIENT
Start: 2021-02-10 | End: 2021-02-10

## 2021-02-10 RX ADMIN — ONDANSETRON 4 MG: 2 INJECTION INTRAMUSCULAR; INTRAVENOUS at 14:58

## 2021-02-10 RX ADMIN — HYDROXYZINE HYDROCHLORIDE 25 MG: 25 TABLET, FILM COATED ORAL at 22:56

## 2021-02-10 RX ADMIN — QUETIAPINE FUMARATE 200 MG: 150 TABLET, EXTENDED RELEASE ORAL at 22:56

## 2021-02-10 RX ADMIN — SODIUM CHLORIDE 1000 ML: 9 INJECTION, SOLUTION INTRAVENOUS at 12:57

## 2021-02-10 RX ADMIN — ALUMINUM HYDROXIDE, MAGNESIUM HYDROXIDE, AND SIMETHICONE 30 ML: 200; 200; 20 SUSPENSION ORAL at 14:58

## 2021-02-10 ASSESSMENT — ENCOUNTER SYMPTOMS
DIZZINESS: 1
HALLUCINATIONS: 1
ABDOMINAL PAIN: 1
DIAPHORESIS: 1
NAUSEA: 1
VOMITING: 1
CHILLS: 1

## 2021-02-10 ASSESSMENT — ACTIVITIES OF DAILY LIVING (ADL)
ORAL_HYGIENE: INDEPENDENT
LAUNDRY: UNABLE TO COMPLETE
HYGIENE/GROOMING: INDEPENDENT
DRESS: INDEPENDENT

## 2021-02-10 ASSESSMENT — MIFFLIN-ST. JEOR
SCORE: 1813.98
SCORE: 1774.51

## 2021-02-10 NOTE — PHARMACY-ADMISSION MEDICATION HISTORY
Pharmacy Medication History  Admission medication history interview status for the 2/10/2021  admission is complete. See EPIC admission navigator for prior to admission medications     Location of Interview: Patient room  Medication history sources: Patient, Surescripts and Care Everywhere    Significant changes made to the medication list:  Removed: hydroxyzine, ziprasidone, and eszopiclone  Changed: Quetiapine XR 50mg - 2 tablets daily to 200mg daily  Added: lorazepam, olanzapine, and venlafaxine    In the past week, patient estimated taking medication this percent of the time: less than 50% due to illness    Additional medication history information:   Patient states the only medication she has taken recently was the quetiapine, otherwise she has not taken any medications in about 2 months. I went through list of medications with the patient and verified she should be taking these medications, but I marked them as not taking.     Medication reconciliation completed by provider prior to medication history? No    Time spent in this activity: 15 mins    Prior to Admission medications    Medication Sig Last Dose Taking? Auth Provider   QUEtiapine ER (SEROQUEL XR) 200 MG 24 hr tablet Take 200 mg by mouth At Bedtime 2/6/2021 Yes Unknown, Entered By History   lamoTRIgine (LAMICTAL) 150 MG tablet Take 2 tablets (300 mg) by mouth daily   Nissa Morris APRN CNP   LORazepam (ATIVAN) 1 MG tablet Take 1 mg by mouth every 6 hours as needed for anxiety   Unknown, Entered By History   OLANZapine (ZYPREXA) 5 MG tablet Take 5 mg by mouth At Bedtime   Unknown, Entered By History   protriptyline (VIVACTIL) 10 MG tablet Take 1 tablet (10 mg) by mouth daily (with dinner)   Nissa Morris APRN CNP   ramelteon (ROZEREM) 8 MG tablet Take 1 tablet (8 mg) by mouth At Bedtime   Nissa Morris APRN CNP   rOPINIRole (REQUIP) 1 MG tablet Take 1 tablet (1 mg) by mouth At Bedtime   Nissa Morris APRN CNP    venlafaxine (EFFEXOR-XR) 150 MG 24 hr capsule Take 150 mg by mouth daily   Unknown, Entered By History       The information provided in this note is only as accurate as the sources available at the time of update(s)

## 2021-02-10 NOTE — TELEPHONE ENCOUNTER
S   2:34 DEC , Eli, called to give clinical on 35. F    [facility] Brought to ED by EMS  naman ED lives with mom, mom called.     B  Multiple IP last [place] last June presents to hospital with intense SI.  Pt  Reports taking multiple otc  Medications every in attempt to kill self . Pt reports taking  4-5 tylenol 4 to 5 ibuprofen 2xs a day for last  3 months. Pt reporting  auditory and tactile Hallucinations of  Satan trying kill her and pulling her down. History of multiple irts stays.  Pt on disability for   Civil commitment  Until may 26 2021. [facility] order and order for ECT.    agrees with IP at this time   if had a gun she would kill herself:    No aggression  Pleasant calm cooperative   No substance  medically cleared. Eats,  Drinks,  and ambultes  Utox neg   covid  ordered.   HCG neg    A  Vol.      R 32/ Bogdan.  Provider accepted for [facility] @ 4pm    Acccected @ 405  Called unit at 4:11 Pm put pt in que  4:20 PMTalked to charge They will contact [facility] ED when ready for admit  4:25 PM called [facility] ED and updated on pt status

## 2021-02-10 NOTE — ED PROVIDER NOTES
History   Chief Complaint:  Suicidal     The history is provided by the EMS personnel and the patient.      Misty Parham is a 35 year old female with history of depressive disorder, borderline personality disorder, and suicidal ideation who presents with suicidal ideation. EMS reports the patient is on a civil commitment to [facility] until the end of May of 2021 with a [facility] and ECT. However, the patient has not been to see her psychiatrist or therapist since September of 2020 and has not been taking her medications.     The patient reports that she has been intentionally taking Unisome, Ibuprofen, and Tylenol in an attempt to kill herself. She reports she has taken 4-5 Unisome, 4-5 Ibuprofen, and 4-5 Tylenol twice daily for the past 3 months. She states she is hearing Satan telling her to try and kill herself. She stopped doing this on 2/6 because she started to feel unwell with nausea, vomiting, chills, diaphoresis, and generalized adbominal pain. She also endorses dizziness, but denies recent falls. She denies alcohol or drug use. She has no homicidal ideation. She states she is taking her prescriptions as directed.     Review of Systems   Constitutional: Positive for chills and diaphoresis.   Gastrointestinal: Positive for abdominal pain, nausea and vomiting.   Neurological: Positive for dizziness.   Psychiatric/Behavioral: Positive for hallucinations, self-injury and suicidal ideas.        No homicidal ideation    All other systems reviewed and are negative.      Allergies:  No Known Allergies    Medications:   EMS notes the patient is not taking the below medications, but is prescribed the following.   Lamictal  Lunesta  Atarax  Vivactil  Seroquel  Ramelteon  Requip  Geodon     Past Medical History:    Depressive disorder with psychotic features  Borderline personality disorder    Suicidal ideation    Chronic insomnia    Past Surgical History:    Cholecystectomy     Family History:    Father:  "diabetes mellitus     Social History:  The patient presents to the ER via EMS.   She lives with her mother.     Physical Exam     Patient Vitals for the past 24 hrs:   BP Temp Temp src Pulse Resp SpO2 Height Weight   02/10/21 1424 -- -- -- 92 17 98 % -- --   02/10/21 1416 -- -- -- 93 26 100 % -- --   02/10/21 1414 -- -- -- 96 27 97 % -- --   02/10/21 1400 (!) 116/91 -- -- 103 20 99 % -- --   02/10/21 1300 129/89 -- -- 93 22 98 % -- --   02/10/21 1230 (!) 136/95 -- -- 98 21 100 % -- --   02/10/21 1219 (!) 138/94 -- -- 101 -- 98 % -- --   02/10/21 1149 (!) 113/91 98.2  F (36.8  C) Oral 122 16 97 % 1.651 m (5' 5\") 108.9 kg (240 lb)       Physical Exam  General: Resting on the bed.  Head: No obvious trauma to head.  Ears, Nose, Throat:  External ears normal.  Nose normal.    Eyes:  Conjunctivae clear.  Pupils are equal, mid point round, and reactive.  no nystagmus.    Neck: Normal range of motion.  Neck supple.   CV: Regular rate and rhythm.  No murmurs.      Respiratory: Effort normal and breath sounds normal.  No wheezing or crackles.   Gastrointestinal: Soft.  No distension. There is no tenderness.  There is no rigidity, no rebound and no guarding.   Neuro: Alert. Moving all extremities appropriately.  Normal speech.  No clonus.  2+ patellar reflexes.  Normal tone.     Skin: Skin is warm and dry.  No rash noted.   PSYCH: down cast eye contact, reports SI with plan.  Denies HI.  Reports AH of satan.  Denies VH.      Emergency Department Course   ECG  ECG taken at 1225  Sinus tachycardia  Flattening of T wave   No significant change as compared to prior, dated 2/23/2020.  Rate 103 bpm. NC interval 140 ms. QRS duration 76 ms. QT/QTc 356/466 ms. P-R-T axes 64 23 30.     Laboratory:  Drug abuse screen 77 urine: all negative    UPT: negative    CBC: WBC 11.5(H), HGB 14.3,   CMP: glucose 107(H) o/w WNL (Creatinine 0.73)   INR: 1.05  Salicylate level: <2  Acetaminophen level: <2  Alcohol ethyl: <0.01    COVID-19 Virus " PCR: Pending    Emergency Department Course:    Reviewed:  I reviewed nursing notes, vitals, past medical history and care everywhere    Assessments:  1250 I obtained history and examined the patient as noted above.   1430 I rechecked the patient and explained findings.     Consults:   1420 I spoke with Poison control regarding the patient's findings and plan of care.     Interventions:  1257 NS 1000 mL IV    Disposition:  The patient will board in the emergency department pending bed placement. Care was signed out to Dr. Melton.     Impression & Plan     Medical Decision Makin year old female with a history depression presents with SI.  VS reassuring.  Broad differential was pursued including not limited to chronic overdose, electrolyte, metabolic, renal dysfunction, arrhythmia, anemia, etc.  Overall patient is well-appearing nontoxic.  CBC shows minimal leukocytosis but not clinically significant.  Hemoglobin is stable.  BMP shows no acute electrolyte, metabolic or renal dysfunction.  LFTs and INR normal not suggestive of acute liver injury nor chronic injury.  Salicylate level negative.  Tylenol negative.  Alcohol negative.  She is adamant that her last ingestion was Saturday.  She is aware that it is Wednesday today.  Based on last ingestion her labs are very reassuring.  Additionally there is no signs of anticholinergic syndrome based on examination.  Pupils are midpoint, no hyperreflexia, no clonus, normal vital signs.  EKG shows sinus rhythm, no evidence of arrhythmia, normal intervals, no evidence of ischemia.  Patient watched on telemetry without abnormal rhythm.  Patient was discussed with poison control.  Given that patient seems to be very adamant and clear about last ingestion, we can medically clear her from overdose.  Labs and vital signs are reassuring.  Patient was seen and evaluated DEC.  Please see their note for further eval.  Patient has been off her medications for quite some time.  Her   is concerned about her.  There can considering revoking her commitment.  Patient would benefit from admission.  She initially was agreeable but later changed her mind.  She states and is having to hold as she is suicidal with a possible threat to self.  Patient was signed out to my partner pending mental health placement.    Covid-19  Misty Parham was evaluated during a global COVID-19 pandemic, which necessitated consideration that the patient might be at risk for infection with the SARS-CoV-2 virus that causes COVID-19.   Applicable protocols for evaluation were followed during the patient's care.   COVID-19 was considered as part of the patient's evaluation. The plan for testing is:  a test was obtained during this visit.    Diagnosis:    ICD-10-CM    1. Suicidal ideation  R45.851    2. Hallucinations  R44.3    3. Depression, unspecified depression type  F32.9        Discharge Medications:  New Prescriptions    No medications on file       Scribe Disclosure:  I, Celina Hackett, am serving as a scribe at 12:17 PM on 2/10/2021 to document services personally performed by Mary Lou Jurado MD based on my observations and the provider's statements to me.     Scribe Disclosure:  I, Quan Montiel, am serving as a scribe at 2:36 PM on 2/10/2021 to document services personally performed by Mary Lou Jurado MD based on my observations and the provider's statements to me.       Mary Lou Jurado MD  02/10/21 [id number]

## 2021-02-10 NOTE — ED NOTES
Placed on 72 hour hold by Dr. Jurado at 1443 hours. Copy of Notice to Patient of Rights Under Emergency Hold provided to pt. Dr. Jurado discussed hospital admission with pt who verbalizes understanding.

## 2021-02-10 NOTE — SAFE
"PT presented to the ED with history of SPMI who presents with intense SI, with intent and a plan. PT also reports auditory and tactile hallucinations telling self to kill herself over the last 2 to 3 months. PT reports that she hears satan talking to her and feels him trying to bring her down. PT reports that she experiences these hallucinations daily.    PT is unable to create an appropriate safety plan and was not able to contract for safety. Pt's current elevated mental health symptoms leave her vulnerable to keeping himself safe at this time, and Pt be admitted to inpatient psychiatric hospitalization.    Current suicidal ideation/self-injurious concerns/methods: Pt reports a plan to overdose. PT also reported to EMS \"if I had a gun, I would kill myself.\"   Pt reports she does not feel safe discharging at this time and rated herself a 10 on intentions to harm herself. (1= suicidal thoughts with no intent to harm 10= absolutely will try to end her life should she be discharged).     Inappropriate sexual behavior: NO    Aggression/homicidal ideation: NO    For additional details see full DEC Assessment  SARAH Cordon  "

## 2021-02-10 NOTE — ED TRIAGE NOTES
"Presents via EMS. Pt reports nausea and diarrhea for 4 days. Reports she has been taking acetaminophen, ibuprofen, Unisom twice a day 4-5 tabs each dose for 3 months to kill herself.  Reports hearing voices \"satans arms around me dragging me down\". Hearing voices for 3 months. Has not been taking prescription medications as prescribed. Has been taking Seroquel for sleep. Reports she has a  with a civil commitment. EMS reports pt endorsed if she had a gun she would kill herself. Decreased PO intake.   "

## 2021-02-10 NOTE — ED NOTES
Bed: [facility]  Expected date:   Expected time:   Means of arrival:   Comments:  [facility] [place] - [phone] - [phone] F suicidal no covid eta 1133

## 2021-02-10 NOTE — ED NOTES
Changed into pt scrubs. Pt belongings secure in locked locker. Informed of Health Officer hold placement for suicidal thoughts and auditory hallucinations. Informed of continuous video observation.

## 2021-02-11 LAB
CHOLEST SERPL-MCNC: 164 MG/DL
HDLC SERPL-MCNC: 44 MG/DL
LDLC SERPL CALC-MCNC: 94 MG/DL
NONHDLC SERPL-MCNC: 120 MG/DL
TRIGL SERPL-MCNC: 130 MG/DL
TSH SERPL DL<=0.005 MIU/L-ACNC: 1.31 MU/L (ref 0.4–4)

## 2021-02-11 PROCEDURE — 124N000002 HC R&B MH UMMC

## 2021-02-11 PROCEDURE — 250N000013 HC RX MED GY IP 250 OP 250 PS 637: Performed by: PSYCHIATRY & NEUROLOGY

## 2021-02-11 PROCEDURE — 84443 ASSAY THYROID STIM HORMONE: CPT | Performed by: PSYCHIATRY & NEUROLOGY

## 2021-02-11 PROCEDURE — 99223 1ST HOSP IP/OBS HIGH 75: CPT | Mod: 95 | Performed by: PSYCHIATRY & NEUROLOGY

## 2021-02-11 PROCEDURE — 80061 LIPID PANEL: CPT | Performed by: PSYCHIATRY & NEUROLOGY

## 2021-02-11 PROCEDURE — 36415 COLL VENOUS BLD VENIPUNCTURE: CPT | Performed by: PSYCHIATRY & NEUROLOGY

## 2021-02-11 RX ORDER — CLONAZEPAM 0.5 MG/1
0.5 TABLET ORAL 2 TIMES DAILY PRN
Status: DISCONTINUED | OUTPATIENT
Start: 2021-02-11 | End: 2021-04-07 | Stop reason: HOSPADM

## 2021-02-11 RX ORDER — LAMOTRIGINE 25 MG/1
25 TABLET ORAL DAILY
Status: DISCONTINUED | OUTPATIENT
Start: 2021-02-11 | End: 2021-02-26

## 2021-02-11 RX ORDER — RAMELTEON 8 MG/1
8 TABLET ORAL AT BEDTIME
Status: DISCONTINUED | OUTPATIENT
Start: 2021-02-11 | End: 2021-04-07 | Stop reason: HOSPADM

## 2021-02-11 RX ORDER — CLONAZEPAM 0.5 MG/1
0.5 TABLET ORAL 2 TIMES DAILY PRN
Status: ON HOLD | COMMUNITY
End: 2021-04-16

## 2021-02-11 RX ORDER — PROTRIPTYLINE HYDROCHLORIDE 10 MG/1
10 TABLET, FILM COATED ORAL
Status: DISCONTINUED | OUTPATIENT
Start: 2021-02-11 | End: 2021-03-31

## 2021-02-11 RX ORDER — ROPINIROLE 1 MG/1
1 TABLET, FILM COATED ORAL AT BEDTIME
Status: DISCONTINUED | OUTPATIENT
Start: 2021-02-11 | End: 2021-04-07 | Stop reason: HOSPADM

## 2021-02-11 RX ORDER — VENLAFAXINE HYDROCHLORIDE 75 MG/1
75 CAPSULE, EXTENDED RELEASE ORAL DAILY
Status: COMPLETED | OUTPATIENT
Start: 2021-02-11 | End: 2021-02-13

## 2021-02-11 RX ADMIN — CLONAZEPAM 0.5 MG: 0.5 TABLET ORAL at 19:33

## 2021-02-11 RX ADMIN — PROTRIPTYLINE HYDROCHLORIDE 10 MG: 10 TABLET ORAL at 17:51

## 2021-02-11 RX ADMIN — RAMELTEON 8 MG: 8 TABLET ORAL at 21:24

## 2021-02-11 RX ADMIN — ROPINIROLE HYDROCHLORIDE 1 MG: 1 TABLET, FILM COATED ORAL at 21:37

## 2021-02-11 RX ADMIN — VENLAFAXINE HYDROCHLORIDE 75 MG: 75 CAPSULE, EXTENDED RELEASE ORAL at 17:51

## 2021-02-11 RX ADMIN — LAMOTRIGINE 25 MG: 25 TABLET ORAL at 17:51

## 2021-02-11 RX ADMIN — QUETIAPINE FUMARATE 200 MG: 150 TABLET, EXTENDED RELEASE ORAL at 21:24

## 2021-02-11 ASSESSMENT — ACTIVITIES OF DAILY LIVING (ADL)
HYGIENE/GROOMING: PROMPTS
DRESS: INDEPENDENT
LAUNDRY: UNABLE TO COMPLETE
DRESS: INDEPENDENT
ORAL_HYGIENE: INDEPENDENT
ORAL_HYGIENE: PROMPTS
HYGIENE/GROOMING: INDEPENDENT
LAUNDRY: UNABLE TO COMPLETE

## 2021-02-11 NOTE — PLAN OF CARE
Problem: OT General Care Plan  Goal: OT Goal 1    Pt has not attended scheduled occupational therapy sessions. Encourage attendance and participation. Pt will be given self-assessment form, and OT staff will explain the purpose of including them in their treatment plan and offer options for meeting their needs.
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
## 2021-02-11 NOTE — PLAN OF CARE
BEHAVIORAL TEAM DISCUSSION    Participants: Shari sanders DO and Jamila RUGGIERO  Progress: New admission  Anticipated length of stay: TBD pending stabilization  Continued Stay Criteria/Rationale: Stabilization  Medical/Physical: Will continue to evaluate and assess  Precautions:   Behavioral Orders   Procedures    Code 1 - Restrict to Unit    Routine Programming     As clinically indicated    Status 15     Every 15 minutes.    Suicide precautions     Patients on Suicide Precautions should have a Combination Diet ordered that includes a Diet selection(s) AND a Behavioral Tray selection for Safe Tray - with utensils, or Safe Tray - NO utensils       Plan: Attempt to get pt to sign in, initiate medications to target sx, stabilization, and development of safe discharge plan  Rationale for change in precautions or plan: N/A new admission

## 2021-02-11 NOTE — PLAN OF CARE
Pt was isolative to room throughout shift, ate meals and spent time resting in bed. Pt refused to respond to staff on the unit. Affect appears blunted, flat, tense. Mood appears depressed. Hygiene is neglected. RODOLFO SI/SIB. Will continue to monitor.

## 2021-02-11 NOTE — ED NOTES
Assumed care.     Pt is waiting for transport to [facility]. H/E transport have been called.    Meagan Tidwell RN,.......................................... 2/10/2021   7:30 PM
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
## 2021-02-11 NOTE — PLAN OF CARE
Received call from [facility]  Brinda Machuca ([phone]/fax [phone]) called with the following report:    Apparently post-discharge from the hospital the patient has not been in compliance with the terms of her provisional discharge.  She has stopped taking meds (except Seroquel) and stopped seeing all of her providers.  She has been responding by text only to Brinda for some time, and this too has been only sporadic.  Apparently after completing the IRTS program she moved home to her mother s home in [place].  She was to follow up with DBT but did not.  She would take [employer] (even though she can drive) to the store with her son and buy OTC sleep medications.  She was approved for Social Security Disability benefits, but Medicare Insurance has not come though and she has MA coverage only at this time.  She has an active [employer]  She is CADI-eligible and may not be able to return to her Mother s home as mom has now found out that she has been lying to her since arriving there in the Fall.  Brinda is not going to revoke the PD for now - if she refuses treatment, let her know and she will revisit this, revoke and initiate a hold.      Work Completed: Participated in team    Chart review: The patient has bee non-adherent to Provisional Discharge, which is not revoked at this time.  She has a hx of adhering to treatment while IP or in a similar structured setting (ie IRTS).  On SSDI but no medicare, only MA.  CADI eligible.  Has been staying with mom but mom may not take her back.  Initial psychosocial and team note completed.  AVS started.    Spoke with CM (see above)    Discharge plan or goal:  For now going to try and get her to sign in and start taking meds to target disorganized sx                Barriers to discharge: Sx stabilization, medication management and safe discharge plan

## 2021-02-11 NOTE — PROGRESS NOTES
02/10/21 2111   Patient Belongings   Did you bring any home meds/supplements to the hospital?  No   Patient Belongings locker;remains with patient  (Locker #6)   Patient Belongings Remaining with Patient clothing;watch   Patient Belongings Put in Hospital Secure Location (Security or Locker, etc.) cell phone/electronics;clothing;plastic bag;shoes;wallet   Belongings Search Yes   Clothing Search Yes   Second Staff oR [place] (RN 32NB)       Belongings Given to Patient on Unit:  Black watch, underwear, paperwork.     Patient Belongings Stored in Locker #6:  Clothing (fuzzy blue socks, green pants, black T-shirt,  shoes, black coat).  Cell phone.  Plaid  wallet (wristlet).  Miscellaneous cards / Health Insurance cards.  [place] 's License.    Patient Valuables Sent to Security / Envelope #[id number]:  [facility] Platinum Visa Debit card (#[id number]).    **Patient arrived with ZERO medications requiring to be sent to security**    A               Admission:  I am responsible for any personal items that are not sent to the safe or pharmacy.  [facility] is not responsible for loss, theft or damage of any property in my possession.    Signature:  _________________________________ Date: _______  Time: _____                                              Staff Signature:  ____________________________ Date: ________  Time: _____      2nd Staff person, if patient is unable/unwilling to sign:    Signature: ________________________________ Date: ________  Time: _____     Discharge:  [facility] has returned all of my personal belongings:    Signature: _________________________________ Date: ________  Time: _____                                          Staff Signature:  ____________________________ Date: ________  Time: _____

## 2021-02-11 NOTE — PROGRESS NOTES
"Misty Parham has been admitted to [facility] from [facility] for Suicidal Ideation.  Patient has been admitted on a 72 Hour Hold, however, is also on a provisional discharge through [facility] until the end of May 2021 which has not yet been revoked, hence the hold.     Apparently, patient was intentionally taking Unisom, Ibuprofen and Tylenol in an attempt to kill herself due to hearing Channing's voice.  She stopped doing this on 2/6 as she felt unwell.  Not taking prescribed medications for 2-3 months.    Upon admission, Misty tells this writer \"Channing has his arms wrapped around me and is dragging me to hell.  He is really angry right now because I did not succeed at killing myself.  It makes me tired trying to kill myself.\"  Patient explained that she would try to come to staff for help because she has been here before (last admit to [facility] in June 2020) and she knows there is nothing available to harm herself.    Reports \"I think Samreen Machuca is my .\"    Could not give a goal for hospitalization.      "

## 2021-02-11 NOTE — PROGRESS NOTES
Clinical Nutrition Services Brief Note    Patient is a 35 year old female with an positive MST screen for unknown weight loss.    Per documented weight history, patient's weight has been trending up. No weight loss noted.   Wt Readings from Last 5 Encounters:   02/10/21 112.8 kg (248 lb 11.2 oz)   02/10/21 108.9 kg (240 lb)   06/18/20 99.6 kg (219 lb 9.6 oz)   06/13/20 103.4 kg (228 lb)   05/07/20 103.8 kg (228 lb 14.4 oz)     Due to inappropriate MST screen for unknown weight loss when no weight loss indicated per chart, RD to sign off at this time. RD available by consult if further nutrition intervention warranted prior to discharge.      Keara Hopson RD, LD  5A/OB/Behavioral Health RD Pager: [phone]

## 2021-02-11 NOTE — PROGRESS NOTES
Pt appears to be sleeping the whole night. No issues noted/reporetd. Pt on status 15. Will continue to monitor.

## 2021-02-12 ENCOUNTER — MEDICAL CORRESPONDENCE (OUTPATIENT)
Dept: HEALTH INFORMATION MANAGEMENT | Facility: CLINIC | Age: 36
End: 2021-02-12

## 2021-02-12 LAB
BASOPHILS # BLD AUTO: 0.1 10E9/L (ref 0–0.2)
BASOPHILS NFR BLD AUTO: 1 %
DIFFERENTIAL METHOD BLD: NORMAL
EOSINOPHIL # BLD AUTO: 0 10E9/L (ref 0–0.7)
EOSINOPHIL NFR BLD AUTO: 0 %
ERYTHROCYTE [DISTWIDTH] IN BLOOD BY AUTOMATED COUNT: 14.7 % (ref 10–15)
HCT VFR BLD AUTO: 40.1 % (ref 35–47)
HGB BLD-MCNC: 12.9 G/DL (ref 11.7–15.7)
IMM GRANULOCYTES # BLD: 0 10E9/L (ref 0–0.4)
IMM GRANULOCYTES NFR BLD: 0.4 %
LYMPHOCYTES # BLD AUTO: 2.3 10E9/L (ref 0.8–5.3)
LYMPHOCYTES NFR BLD AUTO: 28 %
MCH RBC QN AUTO: 29.3 PG (ref 26.5–33)
MCHC RBC AUTO-ENTMCNC: 32.2 G/DL (ref 31.5–36.5)
MCV RBC AUTO: 91 FL (ref 78–100)
MONOCYTES # BLD AUTO: 0.7 10E9/L (ref 0–1.3)
MONOCYTES NFR BLD AUTO: 8.8 %
NEUTROPHILS # BLD AUTO: 5 10E9/L (ref 1.6–8.3)
NEUTROPHILS NFR BLD AUTO: 61.8 %
NRBC # BLD AUTO: 0 10*3/UL
NRBC BLD AUTO-RTO: 0 /100
PLATELET # BLD AUTO: 333 10E9/L (ref 150–450)
RBC # BLD AUTO: 4.41 10E12/L (ref 3.8–5.2)
WBC # BLD AUTO: 8.2 10E9/L (ref 4–11)

## 2021-02-12 PROCEDURE — 85025 COMPLETE CBC W/AUTO DIFF WBC: CPT | Performed by: PSYCHIATRY & NEUROLOGY

## 2021-02-12 PROCEDURE — 250N000013 HC RX MED GY IP 250 OP 250 PS 637: Performed by: PSYCHIATRY & NEUROLOGY

## 2021-02-12 PROCEDURE — 124N000002 HC R&B MH UMMC

## 2021-02-12 PROCEDURE — 99233 SBSQ HOSP IP/OBS HIGH 50: CPT | Mod: 95 | Performed by: PSYCHIATRY & NEUROLOGY

## 2021-02-12 PROCEDURE — 36415 COLL VENOUS BLD VENIPUNCTURE: CPT | Performed by: PSYCHIATRY & NEUROLOGY

## 2021-02-12 RX ORDER — VENLAFAXINE HYDROCHLORIDE 150 MG/1
150 CAPSULE, EXTENDED RELEASE ORAL
Status: DISCONTINUED | OUTPATIENT
Start: 2021-02-14 | End: 2021-02-22

## 2021-02-12 RX ORDER — QUETIAPINE FUMARATE 300 MG/1
300 TABLET, FILM COATED ORAL AT BEDTIME
Status: DISCONTINUED | OUTPATIENT
Start: 2021-02-14 | End: 2021-02-15

## 2021-02-12 RX ORDER — QUETIAPINE FUMARATE 25 MG/1
25 TABLET, FILM COATED ORAL 3 TIMES DAILY PRN
Status: DISCONTINUED | OUTPATIENT
Start: 2021-02-12 | End: 2021-04-07 | Stop reason: HOSPADM

## 2021-02-12 RX ADMIN — QUETIAPINE FUMARATE 200 MG: 150 TABLET, EXTENDED RELEASE ORAL at 21:18

## 2021-02-12 RX ADMIN — RAMELTEON 8 MG: 8 TABLET ORAL at 21:18

## 2021-02-12 RX ADMIN — VENLAFAXINE HYDROCHLORIDE 75 MG: 75 CAPSULE, EXTENDED RELEASE ORAL at 08:36

## 2021-02-12 RX ADMIN — PROTRIPTYLINE HYDROCHLORIDE 10 MG: 10 TABLET ORAL at 19:25

## 2021-02-12 RX ADMIN — ROPINIROLE HYDROCHLORIDE 1 MG: 1 TABLET, FILM COATED ORAL at 21:18

## 2021-02-12 RX ADMIN — CLONAZEPAM 0.5 MG: 0.5 TABLET ORAL at 19:32

## 2021-02-12 RX ADMIN — LAMOTRIGINE 25 MG: 25 TABLET ORAL at 08:36

## 2021-02-12 ASSESSMENT — ACTIVITIES OF DAILY LIVING (ADL)
LAUNDRY: UNABLE TO COMPLETE
HYGIENE/GROOMING: INDEPENDENT
ORAL_HYGIENE: INDEPENDENT
DRESS: INDEPENDENT

## 2021-02-12 NOTE — PLAN OF CARE
Discussion with provider, Dr. Mcfadden. Pt was put on Seroquel by her outpatient team. This medication is not on the [employer].    [facility] called the [facility] attorney office, [phone]. Mary Lou Wood is  of the day and requested that [facility] send the letter requesting amendment to Marixa Rowe.        [facility] sent letter.

## 2021-02-12 NOTE — PLAN OF CARE
..Pt was isolative to room except to come out for phone calls and to meet with the psychiatrist. Not social with peers. Affect was blunted, flat. Mood was depressed, anxious. Pt ate meals and was compliant with medications. Pt is still having AH of jackie saying very negative things to her. No SI/SIB noted. Will continue to monitor.

## 2021-02-12 NOTE — PROGRESS NOTES
Pt appears to be sleeping the whole night. No issues noted/reported. Pt on status 15 checks. Will continue to monitor.

## 2021-02-12 NOTE — PROGRESS NOTES
"Misty has been isolative and withdrawn this shift.  Was willing to engage with writer for a 1:1 in her room reporting \"Channing is so angry with me because I am not dead and it is exhausting to me trying to live and die.\"  Patient appears anxious as her legs are constantly moving, face is grimaced and hands are placed under her things while she is sitting on the edge of the bed.  Requested and received PRN Klonopin which she reported was effective. Also, reported benefit from the lavender patch given.   Ate 100% of her dinner in the lounge.  Did not come out for snack and states \"I am fine\" whenever writer offers anything, however, writer placed 2 apple juices and 4 packs of pat crackers at bedside and patient consumed all of it.   Endorses passive SI with no plan.  Agrees to talk with staff.  "

## 2021-02-12 NOTE — PROGRESS NOTES
"[facility], [place]   Psychiatric Progress Note  Hospital Day: 2        Interim History:   The patient's care was discussed with the treatment team during the daily team meeting and/or staff's chart notes were reviewed.  Staff report patient has been withdrawn to herself on unit, taking medications as prescribed, reporting ongoing AH and SI, no acute events overnight.     Upon interview, the patient reports she is \"not the greatest\" as Channing is still yelling at her and worse than it was on admission. She is still having SI but feels safe on unit as there \"is nothing I can do here\". She denies thoughts to hurt others. Denies other psychosis symptoms. Agreeable to adding some PRN doses of quetiapine to take during the day when AH increases and increasing venlafaxine and scheduled quetiapine on Sunday to further target mood and psychosis. Mood continues to be depressed. She was able to get some sleep last night so that is one improvement from admission. Tolerating restarting medications. States she is just feeling tired today, no other physical health concerns. Arees to sign in voluntary. No additional concerns at this time.          Medications:       lamoTRIgine  25 mg Oral Daily     protriptyline  10 mg Oral Daily with supper     QUEtiapine ER  200 mg Oral At Bedtime     ramelteon  8 mg Oral At Bedtime     rOPINIRole  1 mg Oral At Bedtime     venlafaxine  75 mg Oral Daily          Allergies:   No Known Allergies       Labs:     Recent Results (from the past 48 hour(s))   CBC with platelets differential    Collection Time: 02/10/21 12:20 PM   Result Value Ref Range    WBC 11.5 (H) 4.0 - 11.0 10e9/L    RBC Count 4.99 3.8 - 5.2 10e12/L    Hemoglobin 14.3 11.7 - 15.7 g/dL    Hematocrit 44.6 35.0 - 47.0 %    MCV 89 78 - 100 fl    MCH 28.7 26.5 - 33.0 pg    MCHC 32.1 31.5 - 36.5 g/dL    RDW 15.2 (H) 10.0 - 15.0 %    Platelet Count 381 150 - 450 10e9/L    Diff Method Automated Method     % " Neutrophils 81.8 %    % Lymphocytes 12.5 %    % Monocytes 5.1 %    % Eosinophils 0.0 %    % Basophils 0.3 %    % Immature Granulocytes 0.3 %    Nucleated RBCs 0 0 /100    Absolute Neutrophil 9.4 (H) 1.6 - 8.3 10e9/L    Absolute Lymphocytes 1.4 0.8 - 5.3 10e9/L    Absolute Monocytes 0.6 0.0 - 1.3 10e9/L    Absolute Eosinophils 0.0 0.0 - 0.7 10e9/L    Absolute Basophils 0.0 0.0 - 0.2 10e9/L    Abs Immature Granulocytes 0.0 0 - 0.4 10e9/L    Absolute Nucleated RBC 0.0    Comprehensive metabolic panel    Collection Time: 02/10/21 12:20 PM   Result Value Ref Range    Sodium 138 133 - 144 mmol/L    Potassium 3.9 3.4 - 5.3 mmol/L    Chloride 108 94 - 109 mmol/L    Carbon Dioxide 24 20 - 32 mmol/L    Anion Gap 6 3 - 14 mmol/L    Glucose 107 (H) 70 - 99 mg/dL    Urea Nitrogen 12 7 - 30 mg/dL    Creatinine 0.73 0.52 - 1.04 mg/dL    GFR Estimate >90 >60 mL/min/[1.73_m2]    GFR Estimate If Black >90 >60 mL/min/[1.73_m2]    Calcium 9.8 8.5 - 10.1 mg/dL    Bilirubin Total 0.3 0.2 - 1.3 mg/dL    Albumin 4.0 3.4 - 5.0 g/dL    Protein Total 8.1 6.8 - 8.8 g/dL    Alkaline Phosphatase 80 40 - 150 U/L    ALT 26 0 - 50 U/L    AST 22 0 - 45 U/L   INR    Collection Time: 02/10/21 12:20 PM   Result Value Ref Range    INR 1.05 0.86 - 1.14   Salicylate level    Collection Time: 02/10/21 12:20 PM   Result Value Ref Range    Salicylate Level <2 mg/dL   Acetaminophen level    Collection Time: 02/10/21 12:20 PM   Result Value Ref Range    Acetaminophen Level <2 mg/L   Alcohol ethyl    Collection Time: 02/10/21 12:20 PM   Result Value Ref Range    Ethanol g/dL <0.01 <0.01 g/dL   EKG 12-lead, tracing only    Collection Time: 02/10/21 12:25 PM   Result Value Ref Range    Interpretation ECG Click View Image link to view waveform and result    Drug abuse screen 77 urine (FL, RH, SH)    Collection Time: 02/10/21 12:35 PM   Result Value Ref Range    Amphetamine Qual Urine Negative NEG^Negative    Barbiturates Qual Urine Negative NEG^Negative     Benzodiazepine Qual Urine Negative NEG^Negative    Cannabinoids Qual Urine Negative NEG^Negative    Cocaine Qual Urine Negative NEG^Negative    Opiates Qualitative Urine Negative NEG^Negative    PCP Qual Urine Negative NEG^Negative   HCG qualitative urine (UPT)    Collection Time: 02/10/21 12:35 PM   Result Value Ref Range    HCG Qual Urine Negative NEG^Negative   Asymptomatic SARS-CoV-2 COVID-19 Virus (Coronavirus) by PCR    Collection Time: 02/10/21  4:39 PM    Specimen: Nasopharyngeal   Result Value Ref Range    SARS-CoV-2 Virus Specimen Source Nasopharyngeal     SARS-CoV-2 PCR Result NEGATIVE     SARS-CoV-2 PCR Comment (Note)    Lipid panel    Collection Time: 02/11/21  8:10 AM   Result Value Ref Range    Cholesterol 164 <200 mg/dL    Triglycerides 130 <150 mg/dL    HDL Cholesterol 44 (L) >49 mg/dL    LDL Cholesterol Calculated 94 <100 mg/dL    Non HDL Cholesterol 120 <130 mg/dL   TSH with free T4 reflex and/or T3 as indicated    Collection Time: 02/11/21  8:10 AM   Result Value Ref Range    TSH 1.31 0.40 - 4.00 mU/L   CBC with platelets differential    Collection Time: 02/12/21  7:31 AM   Result Value Ref Range    WBC 8.2 4.0 - 11.0 10e9/L    RBC Count 4.41 3.8 - 5.2 10e12/L    Hemoglobin 12.9 11.7 - 15.7 g/dL    Hematocrit 40.1 35.0 - 47.0 %    MCV 91 78 - 100 fl    MCH 29.3 26.5 - 33.0 pg    MCHC 32.2 31.5 - 36.5 g/dL    RDW 14.7 10.0 - 15.0 %    Platelet Count 333 150 - 450 10e9/L    Diff Method Automated Method     % Neutrophils 61.8 %    % Lymphocytes 28.0 %    % Monocytes 8.8 %    % Eosinophils 0.0 %    % Basophils 1.0 %    % Immature Granulocytes 0.4 %    Nucleated RBCs 0 0 /100    Absolute Neutrophil 5.0 1.6 - 8.3 10e9/L    Absolute Lymphocytes 2.3 0.8 - 5.3 10e9/L    Absolute Monocytes 0.7 0.0 - 1.3 10e9/L    Absolute Eosinophils 0.0 0.0 - 0.7 10e9/L    Absolute Basophils 0.1 0.0 - 0.2 10e9/L    Abs Immature Granulocytes 0.0 0 - 0.4 10e9/L    Absolute Nucleated RBC 0.0           Psychiatric  "Examination:     /83   Pulse 100   Temp 98.3  F (36.8  C) (Tympanic)   Resp 16   Ht 1.651 m (5' 5\")   Wt 112.8 kg (248 lb 11.2 oz)   LMP 01/21/2021   SpO2 99%   BMI 41.39 kg/m    Weight is 248 lbs 11.2 oz  Body mass index is 41.39 kg/m .    Orthostatic Vitals     None        Appearance: awake, alert and slightly unkempt  Attitude:  cooperative  Eye Contact:  poor   Mood:  depressed  Affect:  mood congruent and intensity is blunted  Speech:  clear, coherent and soft volume   Language: fluent and intact in English  Psychomotor, Gait, Musculoskeletal:  no evidence of tardive dyskinesia, dystonia, or tics  Thought Process:  goal oriented  Associations:  no loose associations  Thought Content:  active suicidal ideation present and auditory hallucinations present, denies plan/intent on unit, denies HI  Insight:  limited  Judgement:  limited  Oriented to:  time, person, and place  Attention Span and Concentration:  intact  Recent and Remote Memory:  intact  Fund of Knowledge:  appropriate    Clinical Global Impressions  First:  Considering your total clinical experience with this particular patient population, how severe are the patient's symptoms at this time?: 7 (02/11/21 1842)  Compared to the patient's condition at the START of treatment, this patient's condition is: 4 (02/11/21 1842)  Most recent:  Considering your total clinical experience with this particular patient population, how severe are the patient's symptoms at this time?: 7 (02/11/21 1842)  Compared to the patient's condition at the START of treatment, this patient's condition is: 4 (02/11/21 1842)           Precautions:     Behavioral Orders   Procedures     Code 1 - Restrict to Unit     Routine Programming     As clinically indicated     Status 15     Every 15 minutes.     Suicide precautions     Patients on Suicide Precautions should have a Combination Diet ordered that includes a Diet selection(s) AND a Behavioral Tray selection for Safe " Tray - with utensils, or Safe Tray - NO utensils            Diagnoses:   Major Depressive Disorder, recurrent, severe with psychotic features including command auditory hallucinations with suicidal ideation and recent suicide attempt  Generalized Anxiety Disorder  Borderline Personality Disorder         Assessment & Plan:   Assessment and hospital summary:  This patient is a 35 year old female with history of depression with psychotic features, anxiety and borderline personality disorder who presented to ED with psychosis including CAH and suicidal ideaiton and recent attempt in context of medication non-adherence for several weeks. Patient has long standing history of admission and decompensation when out of structured treatment setting, she reports she stopped taking medications a few weeks ago and has been experiencing an increase in hearing Satan and he is telling her to harm herself, threatening her and she has been having suicidal ideation. She states she has been taking small doses of OTC medications in attempts to harm herself PTA. She was medically cleared in ED including contact with poison control and given time of last use determined not to be at risk for further sequelae. She feels safe on the unit as she reports no access to means to hurt self, she is under a current commitment, she is under a 72 hour hold and she is unsure if she can continue in the hospital voluntarily and will think about this further and approach staff to sign in if willing. She is agreeable to restarting medications as they were helpful in the past but requests olanzapine not be restarted as this caused significant weight gain. Team will coordinate with pts CM re: commitment status and treatment plan.      Inpatient psychiatric hospitalization is warranted at this time for safety, stabilization, and possible adjustment in medications.    Psychiatric treatment/inteventions:  Medications:   -continue PTA lamotrigine restarted at  25mg daily due to non-adherence to target mood  -continue PTA venlafaxine restarted at 75mg daily for depression and SI, will increase to 150mg on Sunday  -continue PTA protriptyline 10mg daily with dinner for mood  -continue PTA quetiapine 200mg XR at bedtime for psychosis, will plan to increase to 300mg on Sunday to optimize  -continue PTA ramelteon 8mg at bedtime for sleep   -continue PTA ropinirole 1mg at bedtime for restless legs  -discontinued PTA olanzapine per pt request 2/2 weight gain, will consider alternatives if optimizing quetiapine not effective     -Team reviewed copy of [employer] order, pts PTA quetiapine not listed as [facility] medication, letter sent to amend [employer]         Laboratory/Imaging: no new labs per psychiatry      Patient will be treated in therapeutic milieu with appropriate individual and group therapies as described.     Medical treatment/interventions:  Medical concerns:   1) Recent suicide attempt via overdose  -evaluated and medically cleared in ED including EKG and levels of salicylate and acetaminophen as above   -continue to monitor      2) Leukocytosis, likely stressed induced, no fever  -repeat CBC today was WNL  -continue to monitor          This note was created by undersigned using a Dragon dictation system. All typing errors or contextual distortion are unintentional and software inherent.     Disposition Plan   Reason for ongoing admission: poses an imminent risk to self  Discharge location: TBD  Discharge Medications: not ordered  Follow-up Appointments: not scheduled  Legal Status: voluntary (on provisional discharge not revoked), admitted on 72HH and signed in 2/12    Patient's care will be transferred to Nissa DORSEY CNP.     Entered by: Shari Mcfadden on 2/12/2021 at 8:09 AM       Video-Visit Details    Type of service:  Video Visit    Video Start Time (time video started): 1000    Video End Time (time video stopped): 1010    Originating Location (pt.  Location): [address]    Distant Location (provider location): Provider remote location    Mode of Communication:  Video Conference via [employer]    Physician has received verbal consent for a Video Visit from the patient? Yes    Shari Mcfadden, DO

## 2021-02-12 NOTE — PLAN OF CARE
CTC reviewed [employer] order. Pt has signed in voluntarily. The [employer] order includes Rexulti, Vraylar, Latuda and Abilify.     Messaged doc.

## 2021-02-13 PROCEDURE — 250N000013 HC RX MED GY IP 250 OP 250 PS 637: Performed by: PSYCHIATRY & NEUROLOGY

## 2021-02-13 PROCEDURE — 124N000002 HC R&B MH UMMC

## 2021-02-13 RX ADMIN — QUETIAPINE FUMARATE 200 MG: 150 TABLET, EXTENDED RELEASE ORAL at 21:18

## 2021-02-13 RX ADMIN — PROTRIPTYLINE HYDROCHLORIDE 10 MG: 10 TABLET ORAL at 17:17

## 2021-02-13 RX ADMIN — ROPINIROLE HYDROCHLORIDE 1 MG: 1 TABLET, FILM COATED ORAL at 21:18

## 2021-02-13 RX ADMIN — LAMOTRIGINE 25 MG: 25 TABLET ORAL at 11:27

## 2021-02-13 RX ADMIN — OLANZAPINE 10 MG: 10 TABLET, FILM COATED ORAL at 11:54

## 2021-02-13 RX ADMIN — VENLAFAXINE HYDROCHLORIDE 75 MG: 75 CAPSULE, EXTENDED RELEASE ORAL at 11:26

## 2021-02-13 RX ADMIN — RAMELTEON 8 MG: 8 TABLET ORAL at 21:18

## 2021-02-13 ASSESSMENT — ACTIVITIES OF DAILY LIVING (ADL)
DRESS: INDEPENDENT
HYGIENE/GROOMING: INDEPENDENT
ORAL_HYGIENE: INDEPENDENT
LAUNDRY: WITH SUPERVISION
ORAL_HYGIENE: INDEPENDENT
LAUNDRY: WITH SUPERVISION
DRESS: INDEPENDENT
HYGIENE/GROOMING: INDEPENDENT

## 2021-02-13 NOTE — PROGRESS NOTES
Pt. willing to engage with this nursing staff.   Pt.slept in late, upon rising took a shower and attended to her ADLs.  When meeting with this staff, the pt. sat on the edge of her bed appearing very anxious.    The pt. reports that she stopped her medications one month ago.  The pt. reports experiencing a male voice outside her head that sounds angry and is mad at her for coming to the hospital.  When asked if she is suicidal, the pt. states that she has some thoughts at times; however indicates that she is safe on the unit currently.  However, the pt. states that  this could change depending on the male voice.  When a prn medication was offered to the pt., the pt. readily agreed to take zyprexa.  Pt.'s level of anxiety, depression, current status regarding suicide ideation, and her current experience of auditory hallucinations will be closely monitored.

## 2021-02-13 NOTE — PROGRESS NOTES
Pt. Reports that the auditory hallucination that she has been experiencing is less after taking 10 mg of zyprexa.

## 2021-02-14 PROCEDURE — 250N000013 HC RX MED GY IP 250 OP 250 PS 637: Performed by: PSYCHIATRY & NEUROLOGY

## 2021-02-14 PROCEDURE — 124N000002 HC R&B MH UMMC

## 2021-02-14 RX ADMIN — OLANZAPINE 10 MG: 10 TABLET, FILM COATED ORAL at 10:04

## 2021-02-14 RX ADMIN — PROTRIPTYLINE HYDROCHLORIDE 10 MG: 10 TABLET ORAL at 17:38

## 2021-02-14 RX ADMIN — LAMOTRIGINE 25 MG: 25 TABLET ORAL at 10:04

## 2021-02-14 RX ADMIN — ROPINIROLE HYDROCHLORIDE 1 MG: 1 TABLET, FILM COATED ORAL at 21:28

## 2021-02-14 RX ADMIN — VENLAFAXINE HYDROCHLORIDE 150 MG: 150 CAPSULE, EXTENDED RELEASE ORAL at 10:04

## 2021-02-14 RX ADMIN — RAMELTEON 8 MG: 8 TABLET ORAL at 21:28

## 2021-02-14 RX ADMIN — QUETIAPINE 300 MG: 300 TABLET, FILM COATED ORAL at 21:28

## 2021-02-14 ASSESSMENT — ACTIVITIES OF DAILY LIVING (ADL)
LAUNDRY: WITH SUPERVISION
DRESS: INDEPENDENT
DRESS: INDEPENDENT
LAUNDRY: WITH SUPERVISION
ORAL_HYGIENE: INDEPENDENT
HYGIENE/GROOMING: INDEPENDENT
HYGIENE/GROOMING: INDEPENDENT
ORAL_HYGIENE: INDEPENDENT

## 2021-02-14 NOTE — PLAN OF CARE
Pt. Willing to engage with this staff with encouragement.  Pt. Refuses breakfast, refuses to get up but will speak with staff in bed.  Pt. Reports auditory hallucination of a male voice telling her to kill herself.  Pt. States that she does not want to kill herself, she is not suicidal.  Pt. Given zyprexa to decrease/eliminate her auditory hallucination.  Pt. Will continue to be monitored closely regarding her cognitive status and physical well-being.

## 2021-02-14 NOTE — PROGRESS NOTES
"Pt has been resting in bed intermittently this shift.  She reports auditory hallucinations of a man telling her mean things from time to time.  Reports passive thoughts of suicide but reports she can remain safe for herself on the unit.  Staff encouraged her to participate in groups or unit activities if it helps to decrease the voices.  Pt states that she is more comfortable remaining in her room.  Reports mood is \"tired\" but that she had good sleep and okay appetite.    "

## 2021-02-14 NOTE — PROGRESS NOTES
Pt. States that the zyprexa diminished the auditory hallucinations that she was experiencing: the command auditory hallucination to kill herself.  Pt. After the medication was able to come to lounge, watch TV, and appears less anxious, more comfortable overall.  Pt. Continues to be isolative to self with a blunted affect.

## 2021-02-15 ENCOUNTER — MEDICAL CORRESPONDENCE (OUTPATIENT)
Dept: HEALTH INFORMATION MANAGEMENT | Facility: CLINIC | Age: 36
End: 2021-02-15

## 2021-02-15 PROCEDURE — 250N000013 HC RX MED GY IP 250 OP 250 PS 637: Performed by: PSYCHIATRY & NEUROLOGY

## 2021-02-15 PROCEDURE — 250N000013 HC RX MED GY IP 250 OP 250 PS 637: Performed by: NURSE PRACTITIONER

## 2021-02-15 PROCEDURE — 124N000002 HC R&B MH UMMC

## 2021-02-15 PROCEDURE — 99233 SBSQ HOSP IP/OBS HIGH 50: CPT | Performed by: NURSE PRACTITIONER

## 2021-02-15 RX ORDER — QUETIAPINE 300 MG/1
300 TABLET, FILM COATED, EXTENDED RELEASE ORAL EVERY EVENING
Status: DISCONTINUED | OUTPATIENT
Start: 2021-02-15 | End: 2021-02-17

## 2021-02-15 RX ADMIN — LAMOTRIGINE 25 MG: 25 TABLET ORAL at 08:32

## 2021-02-15 RX ADMIN — RAMELTEON 8 MG: 8 TABLET ORAL at 20:45

## 2021-02-15 RX ADMIN — OLANZAPINE 10 MG: 10 TABLET, FILM COATED ORAL at 08:32

## 2021-02-15 RX ADMIN — QUETIAPINE FUMARATE 300 MG: 300 TABLET, EXTENDED RELEASE ORAL at 20:45

## 2021-02-15 RX ADMIN — OLANZAPINE 10 MG: 10 TABLET, FILM COATED ORAL at 14:52

## 2021-02-15 RX ADMIN — ROPINIROLE HYDROCHLORIDE 1 MG: 1 TABLET, FILM COATED ORAL at 20:45

## 2021-02-15 RX ADMIN — PROTRIPTYLINE HYDROCHLORIDE 10 MG: 10 TABLET ORAL at 17:52

## 2021-02-15 RX ADMIN — VENLAFAXINE HYDROCHLORIDE 150 MG: 150 CAPSULE, EXTENDED RELEASE ORAL at 08:32

## 2021-02-15 RX ADMIN — OLANZAPINE 10 MG: 10 TABLET, FILM COATED ORAL at 18:53

## 2021-02-15 ASSESSMENT — ACTIVITIES OF DAILY LIVING (ADL)
HYGIENE/GROOMING: INDEPENDENT
HYGIENE/GROOMING: INDEPENDENT
DRESS: INDEPENDENT
ORAL_HYGIENE: INDEPENDENT
DRESS: INDEPENDENT
LAUNDRY: WITH SUPERVISION
ORAL_HYGIENE: INDEPENDENT
LAUNDRY: WITH SUPERVISION

## 2021-02-15 NOTE — PROGRESS NOTES
Pt continues to report auditory hallucinations that are demeaning and occasionally telling her to kill herself.  Pt does report that Zyprexa helped manage the voices.  She reports feeling safe and is able to not act on those thoughts.  She spent much time in the lounge watching tv this evening and says it did help to distract from the voices.  Remains compliant with medications.  She reports good appetite and sleep.

## 2021-02-15 NOTE — PROGRESS NOTES
"[facility], [place]   Psychiatric Progress Note  Hospital Day: 5        Interim History:     The patient's care was discussed with the treatment team during the daily team meeting and staff's chart notes were reviewed.  Pt was documented as sleeping 7, 7 and 6.75 hours during the weekend overnight shifts.  She took PRN Zyprexa x 1 on both Saturday and Sunday.   She is unsure of the extent to which it is beneficial.  She reports her mood has been \"up and down,\" mostly depressed.  She reports that she hears the voice of Satan outside of her head 50-60% of the time (reduced from 100% prior to admission) telling her to kill herself.  She says the voice provides specific instructions/methods but would not disclose details.  \"He told me not to say.\"  The voice also makes derogatory comments and says that he \"will bring me to hell.\" She said, \"I can feel his arms wrapped around me.\"  States she can maintain safety on the unit.  She denies visual hallucinations.  She feels intermittently anxious.  States she slept poorly last night but slept well the 2 nights prior to that.  Advised her that PRN Trazodone is available.  Discussed med history and wrote letter for [employer] amendment to include Seroquel and Zyprexa along with Abilify and Latuda, removing Vraylar and Rexulti.           Medications:       lamoTRIgine  25 mg Oral Daily     protriptyline  10 mg Oral Daily with supper     QUEtiapine  300 mg Oral At Bedtime     ramelteon  8 mg Oral At Bedtime     rOPINIRole  1 mg Oral At Bedtime     venlafaxine  150 mg Oral Daily with breakfast          Allergies:   No Known Allergies       Labs:     No results found for this or any previous visit (from the past 48 hour(s)).       Psychiatric Examination:     /71   Pulse 106   Temp 97.7  F (36.5  C) (Oral)   Resp 17   Ht 1.651 m (5' 5\")   Wt 112.8 kg (248 lb 11.2 oz)   LMP 01/21/2021   SpO2 97%   BMI 41.39 kg/m    Weight is 248 lbs 11.2 oz  " "Body mass index is 41.39 kg/m .    Orthostatic Vitals     None        Appearance: awake, alert and slightly unkempt  Attitude:  cooperative  Eye Contact:  minimal  Mood:  \"up and down,\" mostly depressed  Affect:  mood congruent and intensity is blunted  Speech:  clear, coherent and low volume   Language: fluent and intact in English  Psychomotor, Gait, Musculoskeletal:  no evidence of tardive dyskinesia, dystonia, or tics  Thought Process:  goal oriented, linear  Associations:  no loose associations  Thought Content:  endorses suicidal ideation and auditory hallucinations instructing her to commit suiicde, denies plan/intent on unit, denies HI, reports tactile hallucinations, denies visual hallucinations  Insight:  limited  Judgement:  limited  Oriented to:  month/year, time, person, and place  Attention Span and Concentration:  fair  Recent and Remote Memory:  intact  Fund of Knowledge:  appropriate    Clinical Global Impressions  First:  Considering your total clinical experience with this particular patient population, how severe are the patient's symptoms at this time?: 7 (02/11/21 1842)  Compared to the patient's condition at the START of treatment, this patient's condition is: 4 (02/11/21 1842)  Most recent:  Considering your total clinical experience with this particular patient population, how severe are the patient's symptoms at this time?: 7 (02/11/21 1842)  Compared to the patient's condition at the START of treatment, this patient's condition is: 4 (02/11/21 1842)           Precautions:     Behavioral Orders   Procedures     Code 1 - Restrict to Unit     Routine Programming     As clinically indicated     Status 15     Every 15 minutes.     Suicide precautions     Patients on Suicide Precautions should have a Combination Diet ordered that includes a Diet selection(s) AND a Behavioral Tray selection for Safe Tray - with utensils, or Safe Tray - NO utensils            Diagnoses:     Major depressive " disorder, recurrent, severe with psychotic features  Generalized anxiety disorder  Borderline personality disorder         Assessment & Plan:     Assessment and hospital summary:  This patient is a 35-year-old female with history of depression with psychotic features, anxiety and borderline personality disorder who presented to ED with psychosis including command auditory hallucinations of Satan's voice instructing her to commit suicide and suicidal ideation and recent attempt in context of medication non-adherence for several weeks. Patient has long standing history of admission and decompensation when out of structured treatment setting.  She states she has been taking small doses of OTC medications in attempts to harm herself PTA. She was medically cleared in ED including contact with poison control and given time of last use determined not to be at risk for further sequelae. She feels safe on the unit as she reports no access to means to hurt self.  She is under a current commitment but agreed to sign in voluntarily, and provisional discharge was not revoked.   She is agreeable to restarting medications as they were helpful in the past but requests olanzapine not be restarted as this caused significant weight gain.      Inpatient psychiatric hospitalization is warranted at this time for safety, stabilization, and possible adjustment in medications.    Psychiatric treatment/inteventions:  Medications:   -continue PTA lamotrigine restarted at 25mg daily due to non-adherence to target mood  -continue PTA venlafaxine restarted at 75mg daily and increased to 150 mg for depression and SI  -continue PTA protriptyline 10mg daily with dinner for mood  -continue PTA quetiapine XR, increased from 200 mg to 300 mg since admission, to target mood and psychosis  -continue PTA ramelteon 8mg at bedtime for sleep   -continue PTA ropinirole 1mg at bedtime for restless legs  -discontinued PTA olanzapine per pt request 2/2 weight  gain, but it remains available PRN.    -[employer] amendment filed 2/15.  Request to remove Vraylar and Rexulti and add Seroquel and Zyprexa, in addition to current [employer] medications Abilify and Latuda.        Laboratory/Imaging: no new labs     Patient will be treated in therapeutic milieu with appropriate individual and group therapies as described.     Medical treatment/interventions:  Medical concerns:   1) Recent suicide attempt via overdose  -evaluated and medically cleared in ED    -continue to monitor      2) Leukocytosis, likely stressed induced, no fever  -repeat CBC 2/12 was WNL         Disposition Plan   Reason for ongoing admission: poses an imminent risk to self  Discharge location: TBD  Discharge Medications: not ordered  Follow-up Appointments: not scheduled  Legal Status: voluntary (on provisional discharge not revoked), admitted on [facility] and signed in voluntarily on 2/12      Entered by: Nissa Morris on 2/12/2021 at 3:56 PM

## 2021-02-15 NOTE — PLAN OF CARE
Pt. states that she is experiencing auditory hallucinations of one male voice telling her to kill herself by strangling herself.  Pt. states that she is not suicidal, that she does not want to die.  Pt. reports that the auditory hallucination is very disturbing to her.  Pt. given zyprexa prn which the pt.states does diminish the auditory hallucination. Pt. Is pleasant on approach; pt. exhibits a very blunted affect.

## 2021-02-15 NOTE — PLAN OF CARE
[facility] sent amended [employer] amendment request to Marixa Rowe at the [facility] attorney office.     Requested that Seroquel and Zyprexa be added to the [employer] and that Rexulti and Vraylar be removed.

## 2021-02-16 PROCEDURE — 124N000002 HC R&B MH UMMC

## 2021-02-16 PROCEDURE — 250N000013 HC RX MED GY IP 250 OP 250 PS 637: Performed by: PSYCHIATRY & NEUROLOGY

## 2021-02-16 PROCEDURE — 250N000013 HC RX MED GY IP 250 OP 250 PS 637: Performed by: NURSE PRACTITIONER

## 2021-02-16 PROCEDURE — 99232 SBSQ HOSP IP/OBS MODERATE 35: CPT | Performed by: NURSE PRACTITIONER

## 2021-02-16 RX ADMIN — LAMOTRIGINE 25 MG: 25 TABLET ORAL at 07:51

## 2021-02-16 RX ADMIN — QUETIAPINE FUMARATE 300 MG: 300 TABLET, EXTENDED RELEASE ORAL at 21:11

## 2021-02-16 RX ADMIN — RAMELTEON 8 MG: 8 TABLET ORAL at 21:11

## 2021-02-16 RX ADMIN — PROTRIPTYLINE HYDROCHLORIDE 10 MG: 10 TABLET ORAL at 18:21

## 2021-02-16 RX ADMIN — ROPINIROLE HYDROCHLORIDE 1 MG: 1 TABLET, FILM COATED ORAL at 21:11

## 2021-02-16 RX ADMIN — OLANZAPINE 10 MG: 10 TABLET, FILM COATED ORAL at 19:56

## 2021-02-16 RX ADMIN — OLANZAPINE 10 MG: 10 TABLET, FILM COATED ORAL at 12:55

## 2021-02-16 RX ADMIN — CLONAZEPAM 0.5 MG: 0.5 TABLET ORAL at 12:55

## 2021-02-16 RX ADMIN — VENLAFAXINE HYDROCHLORIDE 150 MG: 150 CAPSULE, EXTENDED RELEASE ORAL at 07:51

## 2021-02-16 ASSESSMENT — ACTIVITIES OF DAILY LIVING (ADL)
ORAL_HYGIENE: INDEPENDENT
DRESS: INDEPENDENT
HYGIENE/GROOMING: INDEPENDENT
LAUNDRY: WITH SUPERVISION
ORAL_HYGIENE: INDEPENDENT
DRESS: SCRUBS (BEHAVIORAL HEALTH)
HYGIENE/GROOMING: INDEPENDENT
LAUNDRY: WITH SUPERVISION

## 2021-02-16 NOTE — PLAN OF CARE
"RN    Patient  is isolative, appears flat and rates anxiety at 7/10 and depression at 7/10 (scale is 1-10 with 10 being the worst). Patient endorses auditory hallucinations that \"have always been there.\" PRN klonopin for \"anxiety\" and PRN Zyprexa for \"voices.\" Patient reports \"it is hard to go to groups because I have social anxiety\"-patient was visible in lounge watching TV in afternoon. Patient states \"I miss my kids-and will try to call them later.\"    Patient endorses chronic SI/SIB with \"no current plan.\"    /81   Pulse 87   Temp 98.1  F (36.7  C) (Tympanic)   Resp 16   Ht 1.651 m (5' 5\")   Wt 112.8 kg (248 lb 11.2 oz)   LMP 01/21/2021   SpO2 98%   BMI 41.39 kg/m  . No complaints of pain.    Nursing will continue to monitor.    "

## 2021-02-16 NOTE — PLAN OF CARE
Pt appeared to sleep through the night. No safety issues or concerns reported. Will continue to monitor.

## 2021-02-16 NOTE — PROGRESS NOTES
"[facility], [place]   Psychiatric Progress Note  Hospital Day: 6        Interim History:     The patient's care was discussed with the treatment team during the daily team meeting and staff's chart notes were reviewed.  Pt was documented as sleeping 7.5 hours during the overnight shift.  She took PRN Zyprexa x 3 yesterday and says it is beneficial.  She has been spending most of her time in her room.  She has not been attending groups and was very noncommittal when asked if she could try one.  She did shower yesterday.  She stayed in bed during breakfast today but has otherwise been eating well.  She reports auditory hallucinations are reduced in volume compared to yesterday.  She continues to hear Satan's voice commanding her to commit suicide, and also reports tactile hallucinations, feeling his arms around her.  She reports ongoing suicidal ideation and contracts for safety on the unit.  She does have chronic suicidal ideation at baseline.           Medications:       lamoTRIgine  25 mg Oral Daily     protriptyline  10 mg Oral Daily with supper     QUEtiapine  300 mg Oral QPM     ramelteon  8 mg Oral At Bedtime     rOPINIRole  1 mg Oral At Bedtime     venlafaxine  150 mg Oral Daily with breakfast          Allergies:   No Known Allergies       Labs:     No results found for this or any previous visit (from the past 48 hour(s)).       Psychiatric Examination:     /81   Pulse 87   Temp 98.1  F (36.7  C) (Tympanic)   Resp 16   Ht 1.651 m (5' 5\")   Wt 112.8 kg (248 lb 11.2 oz)   LMP 01/21/2021   SpO2 98%   BMI 41.39 kg/m    Weight is 248 lbs 11.2 oz  Body mass index is 41.39 kg/m .    Orthostatic Vitals     None        Appearance: awake, alert and slightly unkempt  Attitude:  cooperative  Eye Contact:  minimal  Mood:  depressed  Affect:  mood congruent and intensity is blunted  Speech:  clear, coherent and low volume   Language: fluent and intact in English  Psychomotor, " Gait, Musculoskeletal:  no evidence of tardive dyskinesia, dystonia, or tics  Thought Process:  goal oriented, linear  Associations:  no loose associations  Thought Content:  endorses suicidal ideation and auditory hallucinations instructing her to commit suiicde, contracts for safety on unit, denies HI, reports tactile hallucinations, denies visual hallucinations  Insight:  limited  Judgement:  limited  Oriented to:  month/year, time, person, and place  Attention Span and Concentration:  fair  Recent and Remote Memory:  intact  Fund of Knowledge:  appropriate    Clinical Global Impressions  First:  Considering your total clinical experience with this particular patient population, how severe are the patient's symptoms at this time?: 7 (02/11/21 1842)  Compared to the patient's condition at the START of treatment, this patient's condition is: 4 (02/11/21 1842)  Most recent:  Considering your total clinical experience with this particular patient population, how severe are the patient's symptoms at this time?: 7 (02/11/21 1842)  Compared to the patient's condition at the START of treatment, this patient's condition is: 4 (02/11/21 1842)           Precautions:     Behavioral Orders   Procedures     Code 1 - Restrict to Unit     Routine Programming     As clinically indicated     Status 15     Every 15 minutes.     Suicide precautions     Patients on Suicide Precautions should have a Combination Diet ordered that includes a Diet selection(s) AND a Behavioral Tray selection for Safe Tray - with utensils, or Safe Tray - NO utensils            Diagnoses:     Major depressive disorder, recurrent, severe with psychotic features  Generalized anxiety disorder  Borderline personality disorder         Assessment & Plan:     Assessment and hospital summary:  This patient is a 35-year-old female with history of depression with psychotic features, anxiety and borderline personality disorder who presented to ED with psychosis  including command auditory hallucinations of Channing's voice instructing her to commit suicide and suicidal ideation and recent attempt in context of medication non-adherence for several weeks. Patient has long standing history of admission and decompensation when out of structured treatment setting.  She states she has been taking small doses of OTC medications in attempts to harm herself PTA. She was medically cleared in ED including contact with poison control and given time of last use determined not to be at risk for further sequelae. She feels safe on the unit as she reports no access to means to hurt self.  She is under a current commitment but agreed to sign in voluntarily, and provisional discharge was not revoked.   She is agreeable to restarting medications as they were helpful in the past but requests olanzapine not be restarted as this caused significant weight gain.      Inpatient psychiatric hospitalization is warranted at this time for safety, stabilization, and possible adjustment in medications.    Psychiatric treatment/inteventions:  Medications:   -continue PTA lamotrigine restarted at 25mg daily due to non-adherence to target mood  -continue PTA venlafaxine restarted at 75mg daily and increased to 150 mg for depression and SI  -continue PTA protriptyline 10mg daily with dinner for mood  -continue PTA quetiapine XR, increased from 200 mg to 300 mg since admission, to target mood and psychosis  -continue PTA ramelteon 8mg at bedtime for sleep   -continue PTA ropinirole 1mg at bedtime for restless legs  -discontinued PTA olanzapine per pt request 2/2 weight gain, but it remains available PRN.    -[employer] amendment filed 2/15.  Request to remove Vraylar and Rexulti and add Seroquel and Zyprexa, in addition to current [employer] medications Abilify and Latuda.        Laboratory/Imaging: no new labs     Patient will be treated in therapeutic milieu with appropriate individual and group therapies as  described.     Medical treatment/interventions:  Medical concerns:   1) Recent suicide attempt via overdose  -evaluated and medically cleared in ED    -continue to monitor      2) Leukocytosis, likely stressed induced, no fever  -repeat CBC 2/12 was WNL         Disposition Plan   Reason for ongoing admission: poses an imminent risk to self  Discharge location: TBD  Discharge Medications: not ordered  Follow-up Appointments: not scheduled  Legal Status: voluntary (on provisional discharge not revoked), admitted on [facility] and signed in voluntarily on 2/12      Entered by: Nissa Morris on 2/12/2021 at 3:56 PM

## 2021-02-16 NOTE — PLAN OF CARE
"Patient alert and oriented to person, place, time and situation. Mood depressed, anxious, frustrated. Affect blunted, flat, and congruent with stated mood. She endorsed thoughts of not wanting to be alive due to discomfort and exhaustion of enduring the CAH telling her to kill herself. Despite these AH, she denied thoughts, intent or plan to kill herself. She also endorsed TH \"like someone wrapping their arms around me and squeezing\". She requested and was given prn olanzapine at 1853 for AH. Also given headphones to help with distraction. She reported partial improvement/decrease in the intensity of SH when rechecked. Appetite intact. Stated sleep was poor quality, despite charted 6.75 hours overnight.   "

## 2021-02-17 PROCEDURE — 124N000002 HC R&B MH UMMC

## 2021-02-17 PROCEDURE — 99232 SBSQ HOSP IP/OBS MODERATE 35: CPT | Performed by: NURSE PRACTITIONER

## 2021-02-17 PROCEDURE — 250N000013 HC RX MED GY IP 250 OP 250 PS 637: Performed by: PSYCHIATRY & NEUROLOGY

## 2021-02-17 PROCEDURE — 250N000013 HC RX MED GY IP 250 OP 250 PS 637: Performed by: NURSE PRACTITIONER

## 2021-02-17 RX ORDER — POLYETHYLENE GLYCOL 3350 17 G/17G
17 POWDER, FOR SOLUTION ORAL DAILY PRN
Status: DISCONTINUED | OUTPATIENT
Start: 2021-02-17 | End: 2021-02-22

## 2021-02-17 RX ADMIN — QUETIAPINE FUMARATE 400 MG: 300 TABLET, EXTENDED RELEASE ORAL at 20:38

## 2021-02-17 RX ADMIN — OLANZAPINE 10 MG: 10 TABLET, FILM COATED ORAL at 08:53

## 2021-02-17 RX ADMIN — PROTRIPTYLINE HYDROCHLORIDE 10 MG: 10 TABLET ORAL at 17:33

## 2021-02-17 RX ADMIN — ROPINIROLE HYDROCHLORIDE 1 MG: 1 TABLET, FILM COATED ORAL at 23:12

## 2021-02-17 RX ADMIN — POLYETHYLENE GLYCOL 3350 17 G: 17 POWDER, FOR SOLUTION ORAL at 17:38

## 2021-02-17 RX ADMIN — LAMOTRIGINE 25 MG: 25 TABLET ORAL at 08:50

## 2021-02-17 RX ADMIN — RAMELTEON 8 MG: 8 TABLET ORAL at 23:12

## 2021-02-17 RX ADMIN — VENLAFAXINE HYDROCHLORIDE 150 MG: 150 CAPSULE, EXTENDED RELEASE ORAL at 08:50

## 2021-02-17 ASSESSMENT — ACTIVITIES OF DAILY LIVING (ADL)
LAUNDRY: UNABLE TO COMPLETE
DRESS: INDEPENDENT
HYGIENE/GROOMING: INDEPENDENT
DRESS: INDEPENDENT
ORAL_HYGIENE: INDEPENDENT
LAUNDRY: UNABLE TO COMPLETE
HYGIENE/GROOMING: INDEPENDENT
ORAL_HYGIENE: INDEPENDENT

## 2021-02-17 NOTE — PLAN OF CARE
Patient watched a movie and ate dinner in the lounge. After the movie was over, patient got up and this writer observed patient appearing sad and a little distressed.  Patient appeared like she was going to cry. She said she was hearing auditory hallucination, command in nature, telling her to kill herself. Patient was asked if she would like to take Olanzapine prn and she said it is helpful to her. Olanzapine 10 mg prn given at 1956. Patient agreed to have some snack that had been put out and stay in the lounge a bit until med has taken effect. Will continue to monitor.

## 2021-02-17 NOTE — PROGRESS NOTES
Pt appeared to sleep 7 hours this night. Pt appeared to sleep quietly with unlabored breathing. No PRNs or snacks were given. Will continue to monitor for safety.

## 2021-02-17 NOTE — PROGRESS NOTES
"[facility], [place]   Psychiatric Progress Note  Hospital Day: 7        Interim History:     The patient's care was discussed with the treatment team during the daily team meeting and staff's chart notes were reviewed.  Pt was documented as sleeping 7.5 hours during the overnight shift.  She took PRN Zyprexa x 2 and PRN Klonopin x 1 yesterday.  States her mood is \"flat.\"  She is slightly less depressed compared to admission.  Suicidal thoughts are unchanged compared to admission.  She said she can feel Satan's arms wrapped around her and he tells her to commit suicide by strangling herself with blankets.  She says she would not do this and contracts for safety on the unit.  She hears his voice outside of her head, only in her right ear.  Her anxiety is \"up and down.\"  Her concentration is impaired.  States she talked to her ex-, who is supportive, and her oldest son yesterday.  She reports hard stools with most recent bowel movement yesterday.  Pt advised Miralax is available.  She agreed to increase Seroquel XR.  She inquired about discharge, reasoning that her symptoms would be no different at home.  Informed her that since she is under commitment and continues to endorse very concerning symptoms, the treatment team would contact her outpatient team to revoke her provisional discharge if she does not agree to remain hospitalized until stable.          Medications:       lamoTRIgine  25 mg Oral Daily     protriptyline  10 mg Oral Daily with supper     QUEtiapine  400 mg Oral QPM     ramelteon  8 mg Oral At Bedtime     rOPINIRole  1 mg Oral At Bedtime     venlafaxine  150 mg Oral Daily with breakfast          Allergies:   No Known Allergies       Labs:     No results found for this or any previous visit (from the past 48 hour(s)).       Psychiatric Examination:     /81   Pulse 87   Temp 98.5  F (36.9  C) (Tympanic)   Resp 16   Ht 1.651 m (5' 5\")   Wt 112.8 kg (248 lb " "11.2 oz)   LMP 01/21/2021   SpO2 100%   BMI 41.39 kg/m    Weight is 248 lbs 11.2 oz  Body mass index is 41.39 kg/m .    Orthostatic Vitals     None        Appearance: awake, alert and slightly unkempt  Attitude:  cooperative  Eye Contact:  minimal  Mood:  \"flat\" slightly less depressed, anxiety is up and down  Affect:  mood congruent and intensity is blunted  Speech:  low volume   Language: fluent and intact in English  Psychomotor, Gait, Musculoskeletal:  no evidence of tardive dyskinesia, dystonia, or tics  Thought Process:  goal oriented, linear  Associations:  no loose associations  Thought Content:  endorses suicidal ideation and auditory hallucinations instructing her to commit suiicde, contracts for safety on unit, denies HI, reports tactile hallucinations, denies visual hallucinations  Insight:  limited  Judgement:  limited  Oriented to:  month/year, time, person, and place  Attention Span and Concentration:  fair  Recent and Remote Memory:  intact  Fund of Knowledge:  appropriate    Clinical Global Impressions  First:  Considering your total clinical experience with this particular patient population, how severe are the patient's symptoms at this time?: 7 (02/11/21 1842)  Compared to the patient's condition at the START of treatment, this patient's condition is: 4 (02/11/21 1842)  Most recent:  Considering your total clinical experience with this particular patient population, how severe are the patient's symptoms at this time?: 7 (02/11/21 1842)  Compared to the patient's condition at the START of treatment, this patient's condition is: 4 (02/11/21 1842)           Precautions:     Behavioral Orders   Procedures     Code 1 - Restrict to Unit     Routine Programming     As clinically indicated     Status 15     Every 15 minutes.     Suicide precautions     Patients on Suicide Precautions should have a Combination Diet ordered that includes a Diet selection(s) AND a Behavioral Tray selection for Safe Tray " - with utensils, or Safe Tray - NO utensils            Diagnoses:     Major depressive disorder, recurrent, severe with psychotic features  Generalized anxiety disorder  Borderline personality disorder         Assessment & Plan:     Assessment and hospital summary:  This patient is a 35-year-old female with history of depression with psychotic features, anxiety and borderline personality disorder who presented to ED with psychosis including command auditory hallucinations of Satan's voice instructing her to commit suicide and suicidal ideation and recent attempt in context of medication non-adherence for several weeks. Patient has long standing history of admission and decompensation when out of structured treatment setting.  She states she has been taking small doses of OTC medications in attempts to harm herself PTA. She was medically cleared in ED including contact with poison control and given time of last use determined not to be at risk for further sequelae. She feels safe on the unit as she reports no access to means to hurt self.  She is under a current commitment but agreed to sign in voluntarily, and provisional discharge was not revoked.   She is agreeable to restarting medications as they were helpful in the past but requests olanzapine not be restarted as this caused significant weight gain.      Inpatient psychiatric hospitalization is warranted at this time for safety, stabilization, and possible adjustment in medications.    Psychiatric treatment/inteventions:  Medications:   -continue PTA lamotrigine restarted at 25mg daily due to non-adherence to target mood  -continue PTA venlafaxine restarted at 75mg daily and increased to 150 mg for depression and SI  -continue PTA protriptyline 10mg daily with dinner for mood  -continue PTA quetiapine XR, increased from 200 mg to 400 mg since admission, to target mood and psychosis  -continue PTA ramelteon 8mg at bedtime for sleep   -continue PTA ropinirole 1mg  at bedtime for restless legs  -discontinued PTA olanzapine per pt request 2/2 weight gain, but it remains available PRN.    -[employer] amendment filed 2/15.  Request to remove Vraylar and Rexulti and add Seroquel and Zyprexa, in addition to current [employer] medications Abilify and Latuda.        Laboratory/Imaging: no new labs     Patient will be treated in therapeutic milieu with appropriate individual and group therapies as described.     Medical treatment/interventions:  Medical concerns:   1) Recent suicide attempt via overdose  -evaluated and medically cleared in ED    -continue to monitor      2) Leukocytosis, likely stressed induced, no fever  -repeat CBC 2/12 was WNL         Disposition Plan   Reason for ongoing admission: poses an imminent risk to self  Discharge location: TBD  Discharge Medications: not ordered  Follow-up Appointments: not scheduled  Legal Status: voluntary (on provisional discharge not revoked), admitted on [facility] and signed in voluntarily on 2/12      Entered by: Nissa Morris on 2/12/2021 at 1:50 PM

## 2021-02-17 NOTE — PLAN OF CARE
CTC note        Work Completed:  Reviewed chart and remotely attended team discussion.    Returned call to pt's  from Samreen New at [phone].     ÓSCAR Wilkerson, [facility]  \[facility] [place] [facility]\""/Adult [facility]al [facility] Management [facility]  [address] [place] [address]  Mailcode: W770  [phone] Cell  [phone] Right Fax  [phone] Coverage Line        Samreen requested an update and this was given.    Discharge planning was discussed and Samreen reports that pt will fail OP LOC and has repeatedly through medication non compliance.  She reports that if pt goes back to mother's house, then there would need to be a visiting nurse.  She reports that pt has previously tried an IRTS and this was generally unsuccessful the first time around, then the second episode was a struggle.    Pt has two adolescent children, one is around 14 and is being cared for by maternal mother and pt has an around 18 year old who is being raised by pt's sister.    At concern is how pt does not comply with treatment recommendations.  One of the questions brought up is should pt be placed in an adult foster care setting.  She is on SSDI.    Also, the question is the clinical challenge, how much psychotic thought and how much personality disorder?  Pt typically abandons treatment and regresses and is rescued.     This will be presented at tomorrow's team discussion    Discharge plan or goal: plan in motion.  Pt does need stabilization.                Barriers to discharge: acuity of mental health sxs

## 2021-02-18 PROCEDURE — 250N000013 HC RX MED GY IP 250 OP 250 PS 637: Performed by: PSYCHIATRY & NEUROLOGY

## 2021-02-18 PROCEDURE — 250N000013 HC RX MED GY IP 250 OP 250 PS 637: Performed by: NURSE PRACTITIONER

## 2021-02-18 PROCEDURE — 99232 SBSQ HOSP IP/OBS MODERATE 35: CPT | Performed by: NURSE PRACTITIONER

## 2021-02-18 PROCEDURE — 124N000002 HC R&B MH UMMC

## 2021-02-18 RX ADMIN — POLYETHYLENE GLYCOL 3350 17 G: 17 POWDER, FOR SOLUTION ORAL at 19:22

## 2021-02-18 RX ADMIN — LAMOTRIGINE 25 MG: 25 TABLET ORAL at 08:52

## 2021-02-18 RX ADMIN — PROTRIPTYLINE HYDROCHLORIDE 10 MG: 10 TABLET ORAL at 17:09

## 2021-02-18 RX ADMIN — VENLAFAXINE HYDROCHLORIDE 150 MG: 150 CAPSULE, EXTENDED RELEASE ORAL at 08:52

## 2021-02-18 RX ADMIN — RAMELTEON 8 MG: 8 TABLET ORAL at 21:32

## 2021-02-18 RX ADMIN — QUETIAPINE FUMARATE 400 MG: 300 TABLET, EXTENDED RELEASE ORAL at 19:19

## 2021-02-18 RX ADMIN — ROPINIROLE HYDROCHLORIDE 1 MG: 1 TABLET, FILM COATED ORAL at 21:32

## 2021-02-18 RX ADMIN — OLANZAPINE 10 MG: 10 TABLET, FILM COATED ORAL at 11:14

## 2021-02-18 ASSESSMENT — ACTIVITIES OF DAILY LIVING (ADL)
LAUNDRY: WITH SUPERVISION
ORAL_HYGIENE: INDEPENDENT
LAUNDRY: WITH SUPERVISION
HYGIENE/GROOMING: INDEPENDENT;PROMPTS
DRESS: INDEPENDENT
HYGIENE/GROOMING: INDEPENDENT
DRESS: INDEPENDENT
ORAL_HYGIENE: INDEPENDENT;PROMPTS

## 2021-02-18 NOTE — PLAN OF CARE
BEHAVIORAL TEAM DISCUSSION    Participants: Hina Morris CNP; Sarah Evangelista and Lori Sheets; CTC's; Charleen Booth RN  Progress: [facility] order amendment request filed.  Pt is cooperative with medications.  She has been less than invested in attending unit programming so expectations have been voiced for her to begin to comply.    Anticipated length of stay: 1-2 weeks.   If pt is non compliant with medications and treatment recommendation will consider revocation.  Continued Stay Criteria/Rationale: pt has failed at  Prior level of care and continues to fail treatment placement efforts.  Medical/Physical: uneventful  Precautions:   Behavioral Orders   Procedures     Code 1 - Restrict to Unit     Routine Programming     As clinically indicated     Status 15     Every 15 minutes.     Suicide precautions     Patients on Suicide Precautions should have a Combination Diet ordered that includes a Diet selection(s) AND a Behavioral Tray selection for Safe Tray - with utensils, or Safe Tray - NO utensils       Plan: continue with medication management and encourage unit programming.  Rationale for change in precautions or plan: no change

## 2021-02-18 NOTE — PLAN OF CARE
Pt. laying in bed with headphones on listening to music.  Pt. states that the music helps her cope with the auditory hallucination of Satan telling her negative things and to kill herself.  Pt. states that she does not want to kill herself, that she is not suicidal.  Pt. needs much encouragement to engage with staff, take her schedule medication,  and to accept a prn medication of zyprexa.  Pt. States that she is worried about weight gain with the zyprexa.  Pt. will discuss this with her psychiatric provider.  Pt. was encouraged to attend groups,  interact with other pts,  and become more active overall.

## 2021-02-18 NOTE — PROGRESS NOTES
"[facility], [place]   Psychiatric Progress Note  Hospital Day: 8        Interim History:     The patient's care was discussed with the treatment team during the daily team meeting and staff's chart notes were reviewed.  Pt was documented as sleeping 7 hours during the overnight shift.  She took PRN Zyprexa x 1 and PRN Miralax x 1 yesterday.  She has been spending much of her time in her room, lying in bed.  Stated that her suicidal thoughts, mood and auditory and tactile hallucinations are unchanged compared to yesterday.  She rates anxiety 5/10.  Her appetite has been increased compared to prior to admission.  However, she also reports she didn't eat breakfast yesterday or today.  Her sleep is \"not the greatest\" but she has been napping during the day.  Discussed with pt that her outpatient  is considering adult foster care placement due to pt's lack of participation in IRTS placement and medication non adherence at her mother's home.  Pt said that she was angered by this and did not understand the rationale, in spite of further explanations thereof.  She said, \"I'm going home.\"  Pt said she had  talked to her mother only once since admission and admitted she is unsure of whether her mother will allow her to reside there.  Reminded pt of room lock out order during her hospitalization last summer.  Advised her to begin attending at least 1 group daily, otherwise a room lock out will be implemented.           Medications:       lamoTRIgine  25 mg Oral Daily     protriptyline  10 mg Oral Daily with supper     QUEtiapine  400 mg Oral QPM     ramelteon  8 mg Oral At Bedtime     rOPINIRole  1 mg Oral At Bedtime     venlafaxine  150 mg Oral Daily with breakfast          Allergies:   No Known Allergies       Labs:     No results found for this or any previous visit (from the past 48 hour(s)).       Psychiatric Examination:     BP 95/70   Pulse 103   Temp 98.3  F (36.8  C) (Oral)  " " Resp 16   Ht 1.651 m (5' 5\")   Wt 112.8 kg (248 lb 11.2 oz)   LMP 01/21/2021   SpO2 98%   BMI 41.39 kg/m    Weight is 248 lbs 11.2 oz  Body mass index is 41.39 kg/m .    Orthostatic Vitals     None        Appearance: awake, alert and slightly unkempt  Attitude:  cooperative  Eye Contact:  minimal  Mood:  depressed, moderately anxious  Affect:  mood congruent and intensity is blunted  Speech:  low volume   Language: fluent and intact in English  Psychomotor, Gait, Musculoskeletal:  no evidence of tardive dyskinesia, dystonia, or tics  Thought Process:  goal oriented, linear  Associations:  no loose associations  Thought Content:  endorses suicidal ideation and auditory hallucinations instructing her to commit suiicde, contracts for safety on unit, denies HI, reports tactile hallucinations, denies visual hallucinations  Insight:  limited  Judgement:  limited  Oriented to:  month/year, time, person, and place  Attention Span and Concentration:  fair  Recent and Remote Memory:  intact  Fund of Knowledge:  appropriate    Clinical Global Impressions  First:  Considering your total clinical experience with this particular patient population, how severe are the patient's symptoms at this time?: 7 (02/11/21 1842)  Compared to the patient's condition at the START of treatment, this patient's condition is: 4 (02/11/21 1842)  Most recent:  Considering your total clinical experience with this particular patient population, how severe are the patient's symptoms at this time?: 7 (02/11/21 1842)  Compared to the patient's condition at the START of treatment, this patient's condition is: 4 (02/11/21 1842)           Precautions:     Behavioral Orders   Procedures     Code 1 - Restrict to Unit     Routine Programming     As clinically indicated     Status 15     Every 15 minutes.     Suicide precautions     Patients on Suicide Precautions should have a Combination Diet ordered that includes a Diet selection(s) AND a Behavioral " Tray selection for Safe Tray - with utensils, or Safe Tray - NO utensils            Diagnoses:     Major depressive disorder, recurrent, severe with psychotic features  Generalized anxiety disorder  Borderline personality disorder         Assessment & Plan:     Assessment and hospital summary:  This patient is a 35-year-old female with history of depression with psychotic features, anxiety and borderline personality disorder who presented to ED with psychosis including command auditory hallucinations of Satan's voice instructing her to commit suicide and suicidal ideation and recent attempt in context of medication non-adherence for several weeks. Patient has long standing history of admission and decompensation when out of structured treatment setting.  She states she has been taking small doses of OTC medications in attempts to harm herself PTA. She was medically cleared in ED including contact with poison control and given time of last use determined not to be at risk for further sequelae. She feels safe on the unit as she reports no access to means to hurt self.  She is under a current commitment but agreed to sign in voluntarily, and provisional discharge was not revoked.   She is agreeable to restarting medications as they were helpful in the past but requests olanzapine not be restarted as this caused significant weight gain.      Inpatient psychiatric hospitalization is warranted at this time for safety, stabilization, and possible adjustment in medications.    Psychiatric treatment/inteventions:  Medications:   -continue PTA lamotrigine restarted at 25mg daily due to non-adherence to target mood  -continue PTA venlafaxine restarted at 75mg daily and increased to 150 mg for depression and SI  -continue PTA protriptyline 10mg daily with dinner for mood  -continue PTA quetiapine XR, increased from 200 mg to 400 mg since admission, to target mood and psychosis  -continue PTA ramelteon 8mg at bedtime for sleep    -continue PTA ropinirole 1mg at bedtime for restless legs  -discontinued PTA olanzapine per pt request 2/2 weight gain, but it remains available PRN.    -[facility] amendment filed 2/15.  Request to remove Vraylar and Rexulti and add Seroquel and Zyprexa, in addition to current [facility] medications Abilify and Latuda.        Laboratory/Imaging: no new labs     Patient will be treated in therapeutic milieu with appropriate individual and group therapies as described.     Medical treatment/interventions:  Medical concerns:   1) Recent suicide attempt via overdose  -evaluated and medically cleared in ED    -continue to monitor      2) Leukocytosis, likely stressed induced, no fever  -repeat CBC 2/12 was WNL         Disposition Plan   Reason for ongoing admission: poses an imminent risk to self  Discharge location: TBD.  Considering adult foster care placement.  Discharge Medications: not ordered  Follow-up Appointments: not scheduled  Legal Status: voluntary (on provisional discharge not revoked), admitted on [facility] and signed in voluntarily on 2/12      Entered by: Nissa Morris on 2/12/2021 at 12:50 PM

## 2021-02-19 PROCEDURE — 99232 SBSQ HOSP IP/OBS MODERATE 35: CPT | Performed by: NURSE PRACTITIONER

## 2021-02-19 PROCEDURE — 250N000013 HC RX MED GY IP 250 OP 250 PS 637: Performed by: NURSE PRACTITIONER

## 2021-02-19 PROCEDURE — 250N000013 HC RX MED GY IP 250 OP 250 PS 637: Performed by: PSYCHIATRY & NEUROLOGY

## 2021-02-19 PROCEDURE — 124N000002 HC R&B MH UMMC

## 2021-02-19 RX ADMIN — PROTRIPTYLINE HYDROCHLORIDE 10 MG: 10 TABLET ORAL at 18:23

## 2021-02-19 RX ADMIN — OLANZAPINE 10 MG: 10 TABLET, FILM COATED ORAL at 17:54

## 2021-02-19 RX ADMIN — LAMOTRIGINE 25 MG: 25 TABLET ORAL at 08:54

## 2021-02-19 RX ADMIN — POLYETHYLENE GLYCOL 3350 17 G: 17 POWDER, FOR SOLUTION ORAL at 09:11

## 2021-02-19 RX ADMIN — CLONAZEPAM 0.5 MG: 0.5 TABLET ORAL at 09:09

## 2021-02-19 RX ADMIN — QUETIAPINE FUMARATE 400 MG: 300 TABLET, EXTENDED RELEASE ORAL at 21:30

## 2021-02-19 RX ADMIN — OLANZAPINE 10 MG: 10 TABLET, FILM COATED ORAL at 09:10

## 2021-02-19 RX ADMIN — RAMELTEON 8 MG: 8 TABLET ORAL at 21:30

## 2021-02-19 RX ADMIN — VENLAFAXINE HYDROCHLORIDE 150 MG: 150 CAPSULE, EXTENDED RELEASE ORAL at 08:54

## 2021-02-19 RX ADMIN — ROPINIROLE HYDROCHLORIDE 1 MG: 1 TABLET, FILM COATED ORAL at 21:31

## 2021-02-19 ASSESSMENT — ACTIVITIES OF DAILY LIVING (ADL)
ORAL_HYGIENE: INDEPENDENT
DRESS: INDEPENDENT;SCRUBS (BEHAVIORAL HEALTH)
LAUNDRY: WITH SUPERVISION
DRESS: SCRUBS (BEHAVIORAL HEALTH);INDEPENDENT
HYGIENE/GROOMING: HANDWASHING;INDEPENDENT
HYGIENE/GROOMING: INDEPENDENT
ORAL_HYGIENE: INDEPENDENT

## 2021-02-19 NOTE — PROGRESS NOTES
"Pt was resting in bed early in the shift but came to the lounge to watch tv with others for an hour.  Reports mood is depressed and that the voices were \"bad\" today but it was helpful to be in the milieu to distract from the thoughts.  She denies intent to act on command voices which tell her to hurt herself.  She remains compliant with scheduled medications and says the increase in Seroquel is helping.  Pt denies concerns about s/e of medications at this time.  Took PRN Miralax for constipation this evening.    "

## 2021-02-19 NOTE — PROGRESS NOTES
"[facility], [place]   Psychiatric Progress Note  Hospital Day: 9        Interim History:     The patient's care was discussed with the treatment team during the daily team meeting and staff's chart notes were reviewed.  Pt was documented as sleeping 7.5 hours during the overnight shift.  She took PRN Zyprexa x 1 and PRN Miralax x 1 yesterday.  She did not attend any groups yesterday, in spite of having been informed that a room lockout would be instituted as it was last summer if she did not.  Pt states her anxiety has been high, worrying about where she will go after discharge.  States she is upset about the prospect of adult foster care and does not understand the rationale.  \"I just don't understand what's so wrong with wanting to die.\"  Continues to report suicidal ideation with a plan to strangle herself and contracts for safety in the hospital.  She says she hears Satan's voice about 60% of the time, reduced in volume compared to yesterday.  Her sleep is \"okay.\"  She says her appetite is high.  Last had a bowel movement 2 days ago.           Medications:       lamoTRIgine  25 mg Oral Daily     protriptyline  10 mg Oral Daily with supper     QUEtiapine  400 mg Oral QPM     ramelteon  8 mg Oral At Bedtime     rOPINIRole  1 mg Oral At Bedtime     venlafaxine  150 mg Oral Daily with breakfast          Allergies:   No Known Allergies       Labs:     No results found for this or any previous visit (from the past 48 hour(s)).       Psychiatric Examination:     /73   Pulse 88   Temp 97.9  F (36.6  C) (Tympanic)   Resp 16   Ht 1.651 m (5' 5\")   Wt 112.8 kg (248 lb 11.2 oz)   LMP 01/21/2021   SpO2 100%   BMI 41.39 kg/m    Weight is 248 lbs 11.2 oz  Body mass index is 41.39 kg/m .    Orthostatic Vitals     None        Appearance: awake, alert and slightly unkempt  Attitude:  cooperative  Eye Contact:  fair  Mood:  depressed, anxious  Affect:  mood congruent and intensity is " blunted  Speech:  low volume   Language: fluent and intact in English  Psychomotor, Gait, Musculoskeletal:  no evidence of tardive dyskinesia, dystonia, or tics  Thought Process:  goal oriented, linear  Associations:  no loose associations  Thought Content:  endorses suicidal ideation and auditory hallucinations instructing her to commit suiicde, contracts for safety on unit, denies HI, reports tactile hallucinations, denies visual hallucinations  Insight:  limited  Judgement:  limited  Oriented to:  month/year, time, person, and place  Attention Span and Concentration:  fair  Recent and Remote Memory:  intact  Fund of Knowledge:  appropriate      Clinical Global Impressions  First:  Considering your total clinical experience with this particular patient population, how severe are the patient's symptoms at this time?: 7 (02/11/21 1842)  Compared to the patient's condition at the START of treatment, this patient's condition is: 4 (02/11/21 1842)  Most recent:  Considering your total clinical experience with this particular patient population, how severe are the patient's symptoms at this time?: 6 (02/19/21 0546)  Compared to the patient's condition at the START of treatment, this patient's condition is: 3 (02/19/21 0546)             Precautions:     Behavioral Orders   Procedures     Code 1 - Restrict to Unit     Routine Programming     As clinically indicated     Status 15     Every 15 minutes.     Suicide precautions     Patients on Suicide Precautions should have a Combination Diet ordered that includes a Diet selection(s) AND a Behavioral Tray selection for Safe Tray - with utensils, or Safe Tray - NO utensils            Diagnoses:     Major depressive disorder, recurrent, severe with psychotic features  Generalized anxiety disorder  Borderline personality disorder         Assessment & Plan:     Assessment and hospital summary:  This patient is a 35-year-old female with history of depression with psychotic  features, anxiety and borderline personality disorder who presented to ED with psychosis including command auditory hallucinations of Satelizabeth's voice instructing her to commit suicide and suicidal ideation and recent attempt in context of medication non-adherence for several weeks. Patient has long standing history of admission and decompensation when out of structured treatment setting.  She states she has been taking small doses of OTC medications in attempts to harm herself PTA. She was medically cleared in ED including contact with poison control and given time of last use determined not to be at risk for further sequelae. She feels safe on the unit as she reports no access to means to hurt self.  She is under a current commitment but agreed to sign in voluntarily, and provisional discharge was not revoked.   She is agreeable to restarting medications as they were helpful in the past but requests olanzapine not be restarted as this caused significant weight gain.      Inpatient psychiatric hospitalization is warranted at this time for safety, stabilization, and possible adjustment in medications.    Psychiatric treatment/inteventions:  Medications:   -continue PTA lamotrigine restarted at 25mg daily due to non-adherence to target mood  -continue PTA venlafaxine restarted at 75mg daily and increased to 150 mg for depression and SI  -continue PTA protriptyline 10mg daily with dinner for mood  -continue PTA quetiapine XR, increased from 200 mg to 400 mg since admission, to target mood and psychosis  -continue PTA ramelteon 8mg at bedtime for sleep   -continue PTA ropinirole 1mg at bedtime for restless legs  -discontinued PTA olanzapine per pt request 2/2 weight gain, but it remains available PRN.    -[facility] amendment filed 2/15.  Request to remove Vraylar and Rexulti and add Seroquel and Zyprexa, in addition to current [facility] medications Abilify and Latuda.        Laboratory/Imaging: no new labs     Patient will be  treated in therapeutic milieu with appropriate individual and group therapies as described.     Medical treatment/interventions:  Medical concerns:   1) Recent suicide attempt via overdose  -evaluated and medically cleared in ED    -continue to monitor      2) Leukocytosis, likely stressed induced, no fever  -repeat CBC 2/12 was WNL         Disposition Plan   Reason for ongoing admission: poses an imminent risk to self  Discharge location: TBD.  Considering adult foster care placement versus discharge to mother's home with home care RN to promote medication adherence.  Discharge Medications: not ordered  Follow-up Appointments: not scheduled  Legal Status: voluntary (on provisional discharge not revoked), admitted on [facility] and signed in voluntarily on 2/12      Entered by: Nissa Morris on 2/12/2021 at 10:07 AM

## 2021-02-19 NOTE — PLAN OF CARE
"Pt's affect is sad/blunted. Her mood is \"kind of flat.\" She endorses si; denies plan or intent, \"here.\" She also has voices that say \"it's time to die.\" Klonopin and Zyprexa given for anxiety, \"voices;\" also Miralax for constipation. Pt was asked to come out of her room, as has order to be locked out of room; to attend grps/be in the milieu. She has not yet come out-\"I'm tired.\" Will keep prompting re this. See PCS for CP review.  "

## 2021-02-20 PROCEDURE — 250N000013 HC RX MED GY IP 250 OP 250 PS 637: Performed by: PSYCHIATRY & NEUROLOGY

## 2021-02-20 PROCEDURE — 124N000002 HC R&B MH UMMC

## 2021-02-20 PROCEDURE — 250N000013 HC RX MED GY IP 250 OP 250 PS 637: Performed by: NURSE PRACTITIONER

## 2021-02-20 RX ADMIN — ROPINIROLE HYDROCHLORIDE 1 MG: 1 TABLET, FILM COATED ORAL at 21:17

## 2021-02-20 RX ADMIN — HYDROXYZINE HYDROCHLORIDE 25 MG: 25 TABLET, FILM COATED ORAL at 04:39

## 2021-02-20 RX ADMIN — LAMOTRIGINE 25 MG: 25 TABLET ORAL at 09:20

## 2021-02-20 RX ADMIN — RAMELTEON 8 MG: 8 TABLET ORAL at 21:17

## 2021-02-20 RX ADMIN — POLYETHYLENE GLYCOL 3350 17 G: 17 POWDER, FOR SOLUTION ORAL at 09:25

## 2021-02-20 RX ADMIN — PROTRIPTYLINE HYDROCHLORIDE 10 MG: 10 TABLET ORAL at 16:32

## 2021-02-20 RX ADMIN — QUETIAPINE FUMARATE 400 MG: 300 TABLET, EXTENDED RELEASE ORAL at 19:27

## 2021-02-20 RX ADMIN — VENLAFAXINE HYDROCHLORIDE 150 MG: 150 CAPSULE, EXTENDED RELEASE ORAL at 09:20

## 2021-02-20 ASSESSMENT — ACTIVITIES OF DAILY LIVING (ADL)
ORAL_HYGIENE: INDEPENDENT
LAUNDRY: WITH SUPERVISION
DRESS: INDEPENDENT
HYGIENE/GROOMING: INDEPENDENT
DRESS: INDEPENDENT
LAUNDRY: WITH SUPERVISION
ORAL_HYGIENE: INDEPENDENT
HYGIENE/GROOMING: INDEPENDENT

## 2021-02-20 NOTE — PLAN OF CARE
"S/B: Pt came to desk requesting sheet and fitted sheet for her bed. Pt stating she is not feeling suicidal. Pt care in order to not given linen at this time. Pt aware as she stated why she did not have linen on her bed.    A/R: Pt asked if she is able to report if she feels like harming herself and pt looked down and hesitated. Pt asked how she was feeling and she stated she felt \"flat\". Pt requested medication, appeared anxious and stated she was not sleeping well. Medication given and pt returned to her room.   "

## 2021-02-20 NOTE — PLAN OF CARE
Problem: Behavioral Health Plan of Care  Goal: Adheres to Safety Considerations for Self and Others  Intervention: Develop and Maintain Individualized Safety Plan  Recent Flowsheet Documentation  Taken 2/19/2021 2200 by Woody Mcmahan RN  Safety Measures:    suicide check-in completed    safety rounds completed     Pt withdrawn to self. A&O x4. Pt had a PCO to be locked out of room from [id number]. Pt compliant with treatment plan. Pt observed watching tv in the lounge or listening to music. At the beginning of the shift, pt visibly restless and fidgeting in her chair. Pt reported hearing voices and endorsed anxiety. PRN Olanzapine 10 mg PO given. Pt continues to state she hears Satan and that he is mad at her because she is alive. Denies HI.     Pt states her plan to kill self is via strangulation. RN asked pt if she was able to contract for safety and if she had intent to follow through with plan. Pt paused. RN writer suggested to remove linens and pt was agreeable. On-call provider, Puneet Arevalo notified and PCO added.     Denies pain or acute physical distress. VSS. Med compliant. Good appetite- 100% intake. Pt did not have a BM this shift. Nursing will handoff and continue to monitor and assess.

## 2021-02-20 NOTE — PLAN OF CARE
..Pt was isolative to room except when locked out of her room, minimally social with peers. Affect was blunted, flat. Mood was depressed. Pt ate meals and was compliant with medications. No SI/SIB noted. Will continue to monitor.

## 2021-02-21 PROCEDURE — 250N000013 HC RX MED GY IP 250 OP 250 PS 637: Performed by: PSYCHIATRY & NEUROLOGY

## 2021-02-21 PROCEDURE — 124N000002 HC R&B MH UMMC

## 2021-02-21 PROCEDURE — 250N000013 HC RX MED GY IP 250 OP 250 PS 637: Performed by: NURSE PRACTITIONER

## 2021-02-21 RX ADMIN — CLONAZEPAM 0.5 MG: 0.5 TABLET ORAL at 21:25

## 2021-02-21 RX ADMIN — LAMOTRIGINE 25 MG: 25 TABLET ORAL at 07:59

## 2021-02-21 RX ADMIN — RAMELTEON 8 MG: 8 TABLET ORAL at 21:10

## 2021-02-21 RX ADMIN — VENLAFAXINE HYDROCHLORIDE 150 MG: 150 CAPSULE, EXTENDED RELEASE ORAL at 07:59

## 2021-02-21 RX ADMIN — ROPINIROLE HYDROCHLORIDE 1 MG: 1 TABLET, FILM COATED ORAL at 21:10

## 2021-02-21 RX ADMIN — CLONAZEPAM 0.5 MG: 0.5 TABLET ORAL at 15:45

## 2021-02-21 RX ADMIN — PROTRIPTYLINE HYDROCHLORIDE 10 MG: 10 TABLET ORAL at 18:36

## 2021-02-21 RX ADMIN — POLYETHYLENE GLYCOL 3350 17 G: 17 POWDER, FOR SOLUTION ORAL at 18:41

## 2021-02-21 RX ADMIN — QUETIAPINE FUMARATE 400 MG: 300 TABLET, EXTENDED RELEASE ORAL at 19:35

## 2021-02-21 NOTE — PLAN OF CARE
"..Pt was isolative to room and refused to come out of her room despite being told that it's a patient care order from her psychiatrist and multiple prompts from staff. Pt was not social with peers. Affect was blunted, flat. Mood was depressed. Pt ate meals and was compliant with medications. Pt reports that she is still hearing the same voices telling her \"it's time to die.\" She stated that she was \"talking to satan\" and appeared anxious, fidgety but refused prn medications when offered. Pt denies SI/SIB. Will continue to monitor.    "

## 2021-02-21 NOTE — PROGRESS NOTES
"Misty has been visible in the lounge due to room lock out, however, withdrawn to self mainly.  During 1:1 check patient endorses \"Channing is loudly talking to me today and dragging me to hell with his arms around me tight\".  Patient currently brandt for safety and requesting for linens at bed time.  Agreeable to come to staff.  Continues to endorse passive SI, however, denies plan.  On call discontinued order for no linens.  Patient able to maintain eye contact, smiled a few times with writer.  Medication compliant.    Patient reports that she did have a \"good sized BM today\".  Encouraged to drink water and walk the halls to avoid further constipation.  "

## 2021-02-21 NOTE — PROGRESS NOTES
"Patient was resting in bed as evening shift came out of report and it was reported patient had bed rested all day despite providers order for room lockout for such behaviors.  This writer attempted to encourage patient to come out of room as per agreement last evening with returning linens.  Patient unable to make eye contact, fidgety, disheveled stating \"I am talking to Satan.  It is an unfair order anyway!\"  Patient needed much encouragement from several staff and finally came out of room. Sat in a corner chair in the lounge bent over with head in heads.   Accepted PRN Klonopin (see MAR), box of tissues as she was tearful and a glass of ice water with Crystal lite mixed in.  Did not wish to talk at this time, however, stated that she might later.  On call provider reinstated order for no linens.    Update:  Patient initially did not want to eat dinner, however, writer walked with patient to obtain dinner tray and eventually patient ate 100%.  Patient reported that the PRN of Klonopin \"really helped me\".  Patient also talked about feeling upset about gaining weight, constipation issues from medications, how auditory hallucinations \"feel like constant nails on the chalkboard\", missing her children and frustration about being a \"bad mother\".  Requested all HS medications including PRN Klonopin.  Denied SI/SIB and contracted for safety.  Hoping to talk with provider about a Zoom call with children, note to provider placed.  Agreed with basic linens for this evening.    In addition earlier patient requested and received PRN Miralax which writer mixed with orange juice per patient preference, patient reported having good results.  "

## 2021-02-22 LAB
LABORATORY COMMENT REPORT: NORMAL
SARS-COV-2 RNA RESP QL NAA+PROBE: NEGATIVE
SPECIMEN SOURCE: NORMAL

## 2021-02-22 PROCEDURE — 124N000002 HC R&B MH UMMC

## 2021-02-22 PROCEDURE — 87635 SARS-COV-2 COVID-19 AMP PRB: CPT | Performed by: NURSE PRACTITIONER

## 2021-02-22 PROCEDURE — 250N000013 HC RX MED GY IP 250 OP 250 PS 637: Performed by: NURSE PRACTITIONER

## 2021-02-22 PROCEDURE — 250N000013 HC RX MED GY IP 250 OP 250 PS 637: Performed by: PSYCHIATRY & NEUROLOGY

## 2021-02-22 PROCEDURE — 99232 SBSQ HOSP IP/OBS MODERATE 35: CPT | Performed by: NURSE PRACTITIONER

## 2021-02-22 RX ORDER — POLYETHYLENE GLYCOL 3350 17 G/17G
17 POWDER, FOR SOLUTION ORAL DAILY
Status: DISCONTINUED | OUTPATIENT
Start: 2021-02-22 | End: 2021-04-07 | Stop reason: HOSPADM

## 2021-02-22 RX ADMIN — CLONAZEPAM 0.5 MG: 0.5 TABLET ORAL at 18:05

## 2021-02-22 RX ADMIN — QUETIAPINE FUMARATE 500 MG: 300 TABLET, EXTENDED RELEASE ORAL at 21:14

## 2021-02-22 RX ADMIN — VENLAFAXINE HYDROCHLORIDE 150 MG: 150 CAPSULE, EXTENDED RELEASE ORAL at 08:18

## 2021-02-22 RX ADMIN — RAMELTEON 8 MG: 8 TABLET ORAL at 21:14

## 2021-02-22 RX ADMIN — HYDROXYZINE HYDROCHLORIDE 25 MG: 25 TABLET, FILM COATED ORAL at 04:18

## 2021-02-22 RX ADMIN — LAMOTRIGINE 25 MG: 25 TABLET ORAL at 08:18

## 2021-02-22 RX ADMIN — POLYETHYLENE GLYCOL 3350 17 G: 17 POWDER, FOR SOLUTION ORAL at 12:00

## 2021-02-22 RX ADMIN — PROTRIPTYLINE HYDROCHLORIDE 10 MG: 10 TABLET ORAL at 18:03

## 2021-02-22 RX ADMIN — ROPINIROLE HYDROCHLORIDE 1 MG: 1 TABLET, FILM COATED ORAL at 21:14

## 2021-02-22 ASSESSMENT — ACTIVITIES OF DAILY LIVING (ADL)
ORAL_HYGIENE: INDEPENDENT;PROMPTS
DRESS: INDEPENDENT
HYGIENE/GROOMING: INDEPENDENT;PROMPTS
LAUNDRY: WITH SUPERVISION

## 2021-02-22 NOTE — PROGRESS NOTES
"[facility], [place]   Psychiatric Progress Note  Hospital Day: 12        Interim History:     The patient's care was discussed with the treatment team during the daily team meeting and staff's chart notes were reviewed.  Pt was documented as sleeping 6.5, 7.5 and 6.25 hours during the weekend overnight shifts.  Over the weekend staff report she was inconsistently compliant with her room lock out.  She has not attended any groups since admission.  She has been taking PRN Miralax daily; changed it to scheduled.  She has been occasionally using PRNs of Hydroxyzine and Klonopin and finds them beneficial for anxiety.  She has not been taking PRN Zyprexa due to concerns about weight gain.  Since she has no longer been taking PRN Zyprexa, auditory hallucinations are louder and more frequent.  Over the weekend, her linens were removed from her room due to suicidal ideation and auditory hallucinations of Channing's voice instructing her to strangle herself.  She contracted for safety and was allowed to have her linens.  Pt states her mood has been \"up and down,\" mostly depressed and anxious.  Her energy is low.  Sleep is \"okay;\" she reports waking around 0300 - 0400 and having difficulty falling asleep.  Agreed to increased both Serquel XR and Effexor ER.  Pt states room lockout is unfair.  Discussed the importance of her own actions in her recovery.           Medications:       lamoTRIgine  25 mg Oral Daily     polyethylene glycol  17 g Oral Daily     protriptyline  10 mg Oral Daily with supper     QUEtiapine  500 mg Oral QPM     ramelteon  8 mg Oral At Bedtime     rOPINIRole  1 mg Oral At Bedtime     [START ON 2/23/2021] venlafaxine  225 mg Oral Daily with breakfast          Allergies:   No Known Allergies       Labs:     No results found for this or any previous visit (from the past 48 hour(s)).       Psychiatric Examination:     /76   Pulse 89   Temp 97.4  F (36.3  C)   Resp 16   Ht " "1.651 m (5' 5\")   Wt 112.8 kg (248 lb 11.2 oz)   SpO2 96%   BMI 41.39 kg/m    Weight is 248 lbs 11.2 oz  Body mass index is 41.39 kg/m .    Orthostatic Vitals     None        Appearance: awake, alert and slightly unkempt  Attitude:  cooperative  Eye Contact:  fair  Mood:  \"up and down,\" depressed, anxious  Affect:  mood congruent and intensity is blunted  Speech:  low volume   Language: fluent and intact in English  Psychomotor, Gait, Musculoskeletal:  no evidence of tardive dyskinesia, dystonia, or tics  Thought Process:  goal oriented, linear  Associations:  no loose associations  Thought Content:  endorses suicidal ideation and auditory hallucinations instructing her to commit suicide via strangulation, contracts for safety on unit, denies HI, denies visual hallucinations  Insight:  limited  Judgement:  limited  Oriented to:  month/year, time, person, and place  Attention Span and Concentration:  fair  Recent and Remote Memory:  intact  Fund of Knowledge:  appropriate      Clinical Global Impressions  First:  Considering your total clinical experience with this particular patient population, how severe are the patient's symptoms at this time?: 7 (02/11/21 1842)  Compared to the patient's condition at the START of treatment, this patient's condition is: 4 (02/11/21 1842)  Most recent:  Considering your total clinical experience with this particular patient population, how severe are the patient's symptoms at this time?: 6 (02/19/21 0546)  Compared to the patient's condition at the START of treatment, this patient's condition is: 3 (02/19/21 0546)             Precautions:     Behavioral Orders   Procedures     Code 1 - Restrict to Unit     Routine Programming     As clinically indicated     Status 15     Every 15 minutes.     Suicide precautions     Patients on Suicide Precautions should have a Combination Diet ordered that includes a Diet selection(s) AND a Behavioral Tray selection for Safe Tray - with " utensils, or Safe Tray - NO utensils            Diagnoses:     Major depressive disorder, recurrent, severe with psychotic features  Generalized anxiety disorder  Borderline personality disorder         Assessment & Plan:     Assessment and hospital summary:  This patient is a 35-year-old female with history of depression with psychotic features, anxiety and borderline personality disorder who presented to ED with psychosis including command auditory hallucinations of Satan's voice instructing her to commit suicide and suicidal ideation and recent attempt in context of medication non-adherence for several weeks. Patient has long standing history of admission and decompensation when out of structured treatment setting.  She states she has been taking small doses of OTC medications in attempts to harm herself PTA. She was medically cleared in ED including contact with poison control and given time of last use determined not to be at risk for further sequelae. She feels safe on the unit as she reports no access to means to hurt self.  She is under a current commitment but agreed to sign in voluntarily, and provisional discharge was not revoked.   She is agreeable to restarting medications as they were helpful in the past but requests olanzapine not be restarted as this caused significant weight gain.      Inpatient psychiatric hospitalization is warranted at this time for safety, stabilization, and possible adjustment in medications.    Psychiatric treatment/inteventions:  Medications:   -continue PTA lamotrigine restarted at 25mg daily due to non-adherence to target mood  -continue PTA venlafaxine, restarted at 75mg daily and increased to 225 mg for depression and SI  -continue PTA protriptyline 10mg daily with dinner for mood  -continue PTA quetiapine XR, increased from 200 mg to 500 mg since admission, to target mood and psychosis  -continue PTA ramelteon 8mg at bedtime for sleep   -continue PTA ropinirole 1mg at  bedtime for restless legs  -discontinued PTA olanzapine per pt request 2/2 weight gain, but it remains available PRN.    -Durant amendment filed 2/15.  Request to remove Vraylar and Rexulti and add Seroquel and Zyprexa, in addition to current Durant medications Abilify and Latuda.        Laboratory/Imaging: no new labs     Patient will be treated in therapeutic milieu with appropriate individual and group therapies as described.     Medical treatment/interventions:  Medical concerns:   1) Recent suicide attempt via overdose  -evaluated and medically cleared in ED    -continue to monitor      2) Leukocytosis, likely stressed induced, no fever  -repeat CBC 2/12 was WNL    3) Constipation  -Miralax has been scheduled       Disposition Plan   Reason for ongoing admission: poses an imminent risk to self  Discharge location: TBD.  Considering adult foster care placement versus discharge to mother's home with home care RN to promote medication adherence.  Discharge Medications: not ordered  Follow-up Appointments: not scheduled  Legal Status: voluntary (on provisional discharge not revoked), admitted on [facility] and signed in voluntarily on 2/12      Entered by: Nissa Morris, APRN, CNP

## 2021-02-22 NOTE — PLAN OF CARE
S/B: Pt sitting on her bed, reported anxiety to RN during safety checks.     A: This writer offered medication and asked pt if she was feeling safe.     R: Pt contracted for safety, made eye contact with writer and stated she is feeling anxious. Affect is blunt. Hydroxyzine given. Linen remained on bed and per update note and report from isra, pt was allowed linens at this time as pt is brandt for safety with no si reported at this time. Will continue to monitor.

## 2021-02-22 NOTE — PLAN OF CARE
Pt. sitting in the lounge due to being locked out of her room.  Pt. willing to engage in 1:1 with staff.  Pt. refusing to go to groups  and will answer staff's questions with minimal verbal responses.  Pt. states that she is experiencing auditory hallucinations.  Pt. reports that it is a male voice saying some mean things.  Pt. states that she is safe on the unit, denies suicidal ideation.  Pt. was offered zyprexa to assist with diminishing her auditory hallucinations.  Pt. declined the medication at this time.

## 2021-02-23 PROCEDURE — 124N000002 HC R&B MH UMMC

## 2021-02-23 PROCEDURE — 250N000013 HC RX MED GY IP 250 OP 250 PS 637: Performed by: NURSE PRACTITIONER

## 2021-02-23 PROCEDURE — 250N000013 HC RX MED GY IP 250 OP 250 PS 637: Performed by: PSYCHIATRY & NEUROLOGY

## 2021-02-23 PROCEDURE — 99232 SBSQ HOSP IP/OBS MODERATE 35: CPT | Performed by: NURSE PRACTITIONER

## 2021-02-23 RX ADMIN — VENLAFAXINE HYDROCHLORIDE 225 MG: 150 CAPSULE, EXTENDED RELEASE ORAL at 09:48

## 2021-02-23 RX ADMIN — LAMOTRIGINE 25 MG: 25 TABLET ORAL at 09:48

## 2021-02-23 RX ADMIN — POLYETHYLENE GLYCOL 3350 17 G: 17 POWDER, FOR SOLUTION ORAL at 09:48

## 2021-02-23 ASSESSMENT — ACTIVITIES OF DAILY LIVING (ADL)
DRESS: INDEPENDENT
ORAL_HYGIENE: INDEPENDENT
LAUNDRY: WITH SUPERVISION
ORAL_HYGIENE: INDEPENDENT
DRESS: INDEPENDENT
LAUNDRY: WITH SUPERVISION
HYGIENE/GROOMING: INDEPENDENT
HYGIENE/GROOMING: INDEPENDENT

## 2021-02-23 NOTE — PLAN OF CARE
Problem: Cognitive Impairment (Psychotic Signs/Symptoms)  Goal: Optimal Cognitive Function (Psychotic Signs/Symptoms)  Outcome: No Change     SI/Self harm:  pt reports hearing the voice of Channing tell her to kill herself. Pt states she is safe on the unit, does not intent to harm herself, and will come to staff if SI worsens.   Aggression/agitation/HI:  none reported or observed  AVH:  Auditory hallucinations, see above  Sleep: NOC shift documented 7 hours, pt states she did not sleep and is very tired this morning. Pt needed much prompting to get out of bed.   PRN Med: No PRNs administered this shift  Medication AE: none reported or observed  Physical Complaints/Issues: pt reports feeling tired, otherwise no concerns  I & O: eating and drinking well  ADLs: independent  Vitals:  stable and WNL  COVID 19 Assessment:  pt shows no signs of COVID19 infection, and tested negative on 2/22/21  Milieu Participation: minimal, pt keeps to self. Pt continues to be locked out of her room per provider order for various times throughout the day. Pt is compliant with this.   Behavior: calm, quiet, minimal interaction, short answers    Pt reported to writer that she still has thoughts of SI but does not intent to harm self. Pt's provider discontinued order to remove linens, and agrees that pt should have linens as pt's room is right next to the desk and pt is agreeing to remain safe. Linens returned to pt's room.   No other concerns at this time. Nursing will continue to monitor and assess.

## 2021-02-23 NOTE — PLAN OF CARE
Work Completed:  Reviewed chart and attended team discussion.  Pt signed GEORGI for her mother, Violeta Benedict [phone].  Placed call to mother's phone and left discreet message requesting call back  Followed up on amended Dur[facility] with Manjeet Irvin.  They emailed the court ordered document and this was printed and placed in pt's chart.  Discharge plan or goal: stabilize and return to lower level of care.  Pt would like to return to live with mother.                Barriers to discharge: acuity of mental health sxs

## 2021-02-23 NOTE — PROGRESS NOTES
[facility],  Psychiatric Progress Note      Impression:     Misty Parham is a 35-year-old female admitted to [facility] on 2/10/2021.  She was admitted under ongoing civil commitment MI with Bhargav in [facility] after taking 4-5 tabs of Unisom, 4-5 tabs of Ibuprofen and 4-5 tabs of Tylenol twice daily for 3 months in an attempt to commit suicide.  She was experiencing auditory hallucinations of Satan's voice commanding her to commit suicide.  Provisional discharge was not revoked.  She had stopped taking medications several weeks prior to admission.  Since admission, Seroquel XR, Effexor ER, Rozerem and Protriptyline were restarted.  A Lamictal titration was restarted.  PRNs of Klonopin, Seroquel, Hydroxyzine Zyprexa and Trazodone were initiated.  She was spending nearly all of her time in her room and showed little motivation for treatment, so a room lock out was initiated as it has been during previous hospitalizations.  She continues to report auditory hallucinations of Satan instructing her to commit suicide.  She continues to report suicidal ideation.  Insight is very limited.         Diagnoses:     Major depressive disorder, recurrent, severe with psychotic features  Generalized anxiety disorder  Borderline personality disorder         Plan:     Medications:  Continue Lamictal titration (due for increase 2/25).  Continue Rozerem 8 mg at HS.  Continue Seroquel  mg every evening.  Continue Protryptyline 10 mg with dinner.  Continue PRNs of Klonopin, Seroquel, Hydroxyzine, Zyprexa and Trazodone.    Lock out of room from 9:30 AM - 12 PM, 1 PM - 3 PM and 5 PM to 9 PM.    She is under commitment MI with Bhargav (Zyprexa, Seroquel, Latuda, Abilify) in [facility].   Disposition to be determined.  Options include discharge to her mother's home with home care RN services to help ensure med compliance and adult foster care.  She has outpatient  "psychiatry, therapy and case management.      Attestation:  Patient has been seen and evaluated by me, Nissa Morris, APRN CNP  The patient was counseled on nature of illness and treatment plan/options  Care was coordinated with treatment team       Clinical Global Impressions  First:  Considering your total clinical experience with this particular patient population, how severe are the patient's symptoms at this time?: 7 (02/11/21 1842)  Compared to the patient's condition at the START of treatment, this patient's condition is: 4 (02/11/21 1842)  Most recent:  Considering your total clinical experience with this particular patient population, how severe are the patient's symptoms at this time?: 6 (02/19/21 0546)  Compared to the patient's condition at the START of treatment, this patient's condition is: 3 (02/19/21 0546)            Interim History:     The patient's care was discussed with the treatment team and chart notes were reviewed.  Pt was documented as sleeping 7.25 hours during the overnight shift.  She took PRN Klonopin x 1 and PRN Hydroxyzine x 1 yesterday.  She did not attend any groups yesterday and has been quietly sitting in the lounge during room lock out times.  States she slept poorly last night because linens were removed from her room due to safety concerns from the weekend prior.  Clarified with pt that there is no active order for linens to be removed.  She says her anxiety has been \"up and down.\"  Her mood remains depressed with no significant changes since admission.  Continues to report hearing Satan's voice commanding her to commit suicide.  \"We're corresponding a plan to get out of here so we can finish what we started (overdose).\"  She contracts for safety on the unit.  States that prior to admission she had been overdosing on small quantities of OTC meds over time because \"maybe the liver or kidneys would start failing and the people around me wouldn't know I did it.\"  She " "denies side effects from meds.          Medications:     Current Facility-Administered Medications   Medication     acetaminophen (TYLENOL) tablet 650 mg     alum & mag hydroxide-simethicone (MAALOX) suspension 30 mL     clonazePAM (klonoPIN) tablet 0.5 mg     hydrOXYzine (ATARAX) tablet 25 mg     lamoTRIgine (LaMICtal) tablet 25 mg     OLANZapine (zyPREXA) tablet 10 mg    Or     OLANZapine (zyPREXA) injection 10 mg     polyethylene glycol (MIRALAX) Packet 17 g     protriptyline (VIVACTIL) tablet 10 mg     QUEtiapine (SEROquel XR) 24 hr tablet 500 mg     QUEtiapine (SEROquel) tablet 25 mg     ramelteon (ROZEREM) tablet 8 mg     rOPINIRole (REQUIP) tablet 1 mg     traZODone (DESYREL) tablet 50 mg     venlafaxine (EFFEXOR-XR) 24 hr capsule 225 mg             Allergies:   No Known Allergies         Psychiatric Examination:     /74   Pulse 91   Temp 97.7  F (36.5  C) (Oral)   Resp 16   Ht 1.651 m (5' 5\")   Wt 112.8 kg (248 lb 11.2 oz)   SpO2 98%   BMI 41.39 kg/m      Appearance: awake, alert and slightly unkempt  Attitude:  cooperative  Eye Contact:  fair  Mood:  anxiety is \"up and down,\" depressed  Affect:  mood congruent and intensity is blunted  Speech:  low volume   Language: fluent and intact in English  Psychomotor, Gait, Musculoskeletal:  no evidence of tardive dyskinesia, dystonia, or tics  Thought Process:  goal oriented, linear  Associations:  no loose associations  Thought Content:  endorses suicidal ideation and auditory hallucinations instructing her to commit suicide via strangulation or overdose following discharge, contracts for safety on unit, denies HI, denies visual hallucinations  Insight:  limited  Judgement:  limited  Oriented to:  month/year, time, person, and place  Attention Span and Concentration:  fair  Recent and Remote Memory:  intact  Fund of Knowledge:  appropriate         Labs:     No results found for this or any previous visit (from the past 24 hour(s)).  "

## 2021-02-23 NOTE — PROGRESS NOTES
Pt's CM (Rachael [phone]) called for an update. She was unsure if pt's mother would be able to take pt home. Rachael spoke with Hina FLORES about discharge planning and how pt is doing.

## 2021-02-23 NOTE — PLAN OF CARE
"  Problem: Somatic Disturbance (Anxiety Signs/Symptoms)  Goal: Improved Somatic Symptoms (Anxiety Signs/Symptoms)  2/22/2021 2254 by Meagan Wallace RN  Outcome: No Change  Flowsheets (Taken 2/22/2021 2254)  Mutually Determined Action Steps (Improved Somatic Symptoms): rates anxiety level via scale     Pt. was visible in milieu. Pt. was quiet and was minimally social with peers. Pt. ate snacks and dinner. Pt. endorsed anxiety which she rated 8/10. Pt. requested and was given PRN Klonopin. Pt. later reported improved anxiety level. Pt. endorsed depression 7/10 and hearing voices form Satan. Pt. unable to disclose what the voices are saying but states that there are no command hallucinations. Pt. reported suicidal thoughts \"at the back of my mind\". Pt. contracts for safety while on the unit. Pt. denies SIB. Pt. was compliant with scheduled medications.   "

## 2021-02-24 PROCEDURE — 99233 SBSQ HOSP IP/OBS HIGH 50: CPT | Performed by: NURSE PRACTITIONER

## 2021-02-24 PROCEDURE — 124N000002 HC R&B MH UMMC

## 2021-02-24 PROCEDURE — 250N000013 HC RX MED GY IP 250 OP 250 PS 637: Performed by: NURSE PRACTITIONER

## 2021-02-24 PROCEDURE — 250N000011 HC RX IP 250 OP 636: Performed by: NURSE PRACTITIONER

## 2021-02-24 RX ORDER — OLANZAPINE 10 MG/2ML
10 INJECTION, POWDER, FOR SOLUTION INTRAMUSCULAR EVERY EVENING
Status: DISCONTINUED | OUTPATIENT
Start: 2021-02-24 | End: 2021-02-24

## 2021-02-24 RX ORDER — QUETIAPINE 300 MG/1
300 TABLET, FILM COATED, EXTENDED RELEASE ORAL EVERY EVENING
Status: DISCONTINUED | OUTPATIENT
Start: 2021-02-24 | End: 2021-03-31

## 2021-02-24 RX ORDER — OLANZAPINE 10 MG/2ML
10 INJECTION, POWDER, FOR SOLUTION INTRAMUSCULAR EVERY EVENING
Status: DISCONTINUED | OUTPATIENT
Start: 2021-02-24 | End: 2021-03-31

## 2021-02-24 RX ORDER — OLANZAPINE 10 MG/2ML
10 INJECTION, POWDER, FOR SOLUTION INTRAMUSCULAR EVERY MORNING
Status: DISCONTINUED | OUTPATIENT
Start: 2021-02-24 | End: 2021-03-02

## 2021-02-24 RX ADMIN — OLANZAPINE 10 MG: 10 INJECTION, POWDER, FOR SOLUTION INTRAMUSCULAR at 21:00

## 2021-02-24 RX ADMIN — OLANZAPINE 10 MG: 10 INJECTION, POWDER, FOR SOLUTION INTRAMUSCULAR at 09:28

## 2021-02-24 ASSESSMENT — ACTIVITIES OF DAILY LIVING (ADL)
LAUNDRY: WITH SUPERVISION
DRESS: INDEPENDENT
HYGIENE/GROOMING: INDEPENDENT;PROMPTS
DRESS: INDEPENDENT
HYGIENE/GROOMING: INDEPENDENT
ORAL_HYGIENE: INDEPENDENT
LAUNDRY: WITH SUPERVISION
ORAL_HYGIENE: INDEPENDENT;PROMPTS

## 2021-02-24 NOTE — PLAN OF CARE
Problem: Sleep Disturbance (Psychotic Signs/Symptoms)  Goal: Improved Sleep (Psychotic Signs/Symptoms)  Recent Flowsheet Documentation  Taken 2/24/2021 0622 by Randall Armstrong RN  Mutually Determined Action Steps (Improved Sleep):   remains out of bed during day   uses relaxation techniques  Patient observed sleeping during most of the night but was out twice briefly to get some water to drink. Declined offer of PRN to help facilitate sleep; later fell asleep and slept for the rest of the night.

## 2021-02-24 NOTE — PLAN OF CARE
CTC note    Work Completed:  Reviewed chart and remotely attended team discussion.    Pt's , Samreen Machuca is on vacation.  Placed call to the  of her   supervisor, Mona Mcginnis at [phone] requesting call back and revocation. Mona AGUILAR Called back and she will contact Samreen who happens to be available today.    Will call mother of pt, Mona [phone] back as she can be reached from 12 to 1 only today and the question needing clarification is can pt move back in with her once she is stable.      Discharge plan or goal: continue to work with court system.                  Barriers to discharge: acuity of mental health sxs. Pt is no longer cooperative with taking her medications and will need a revocation

## 2021-02-24 NOTE — PROGRESS NOTES
"Pt was resting in bed early in the shift when staff approached to check in.  Affect remains flat, blunted.  Reports feeling depressed and hearing voices telling her to harm herself.  She denies active thoughts/intent to self-harm.  Staff encouraged her to be less isolative and informed her that the behavioral plan is for her to be out of her room from 5pm to 9pm this evening.  Pt stated that she will refuse her medications.  She states \"I don't want to get better.\"    Pt did sit in the lounge at 5 pm but continues to decline scheduled Seroquel this evening.  No Durant medications ordered at this time and Pt appears sitting calmly in the lounge.  No Durant papers in shadow chart.  Writer notified on-call MD about Durant.  MD recommends encouraging her to take her scheduled medications as we cannot force IM Zyprexa unless there is an emergency.  Pt remains sitting calmly in the lounge at this time.    "

## 2021-02-24 NOTE — PLAN OF CARE
"Mother of pt was available for a call and pt signed GEORGI yesterday to mother.  Mother is Mona at [phone].    Mother reports that she has housed pt and pt's two children for the past 8 years but no longer feels able to do so.    \"I'm not trained to deal with this illness.  I'm left picking up the pieces\".      Mother also notes that pt has done fairly well until her  returned to the [place] a few weeks ago and mother noted that pt seemed to quit taking her medications and get more depressed around that time.      Mother also notes that she has not spoken to pt for about a week as pt would just sit silently on the phone.  Mother describes a lot of personality disorder sxs.  "

## 2021-02-24 NOTE — PLAN OF CARE
Pt. appears agitated, irritable overall.  Pt. Is refusing her medications; this staff attempted three times.  Pt. states that she is going home today and is not taking medications.  Pt. denies suicidal ideation, acknowledges that she is still experiencing auditory hallucinations;  but refuses to discuss her current emotional and cognitive status with this nurse.  Pt. was given IM Zyprexa in compliance with the Durant ruling.  Pt. cooperated with the administration of this medication with minimal encouragement.  Pt. Is currently refusing to engage with staff, attend any groups, come out of her room.  Pt. appears irritated about being hospitalized.

## 2021-02-24 NOTE — PLAN OF CARE
Notice of intent to revoke provisional discharge and notice of emergency revocation of provisional discharge received; filed with the court today, 2/24.    Copy to pt, copy to chart.

## 2021-02-24 NOTE — PLAN OF CARE
CTC advised pt , , Samreen, that pt has been refusing medication and requesting discharge. Advised that a 72 hour hold is being placed and requested revocation.    She will do the revocation. She will also start the process for [place] [employer] for adult foster care.

## 2021-02-24 NOTE — PROGRESS NOTES
[facility],  Psychiatric Progress Note      Impression:     Misty Parham is a 35-year-old female admitted to [facility] on 2/10/2021.  She was admitted under ongoing civil commitment MI with Bhargav in [facility] after taking 4-5 tabs of Unisom, 4-5 tabs of Ibuprofen and 4-5 tabs of Tylenol twice daily for 3 months in an attempt to commit suicide.  She was experiencing auditory hallucinations of Satan's voice commanding her to commit suicide.  Provisional discharge was not revoked.  She had stopped taking medications several weeks prior to admission.  Since admission, Seroquel XR, Effexor ER, Rozerem and Protriptyline were restarted.  A Lamictal titration was restarted.  PRNs of Klonopin, Seroquel, Hydroxyzine Zyprexa and Trazodone were initiated.  She began refusing medications on 2/23.  She was spending nearly all of her time in her room and showed little motivation for treatment, so a room lock out was initiated as it has been during previous hospitalizations.  She continues to report auditory hallucinations of Satan instructing her to commit suicide.  She continues to report suicidal ideation.  Insight is poor.         Diagnoses:     Borderline personality disorder  Major depressive disorder, recurrent, severe with psychotic features  Generalized anxiety disorder         Plan:     Medications:  Continue Lamictal titration (due for increase 2/25 but will need to hold off if she continues to refuse).  Continue Rozerem 8 mg at HS.  Change Seroquel XR to 200 mg in the AM and 300 mg in the evening with a back up of IM Zyprexa available per Bhargav.  Continue Protriptyline 10 mg with dinner.  Continue PRNs of Klonopin, Seroquel, Hydroxyzine, Zyprexa and Trazodone.     Lock out of room from 9:30 AM - 12 PM, 1 PM - 3 PM and 5 PM to 9 PM.    She is under commitment MI with Bhargav (Zyprexa, Seroquel, Latuda, Abilify) in [facility].   Plan to revoke  "provisional discharge as she has now begun refusing medications.  72-hour hold placed at 1115 on 2/24.  Disposition to be determined.  Options include discharge to her mother's home with home care RN services to help ensure med compliance and adult foster care.  She has outpatient psychiatry, therapy and case management.      Attestation:  Patient has been seen and evaluated by me, WILLIAN Sánchez CNP  The patient was counseled on nature of illness and treatment plan/options  Care was coordinated with treatment team       Clinical Global Impressions  First:  Considering your total clinical experience with this particular patient population, how severe are the patient's symptoms at this time?: 7 (02/11/21 1842)  Compared to the patient's condition at the START of treatment, this patient's condition is: 4 (02/11/21 1842)  Most recent:  Considering your total clinical experience with this particular patient population, how severe are the patient's symptoms at this time?: 6 (02/19/21 0546)  Compared to the patient's condition at the START of treatment, this patient's condition is: 3 (02/19/21 0546)            Interim History:     The patient's care was discussed with the treatment team and chart notes were reviewed.  Pt was documented as sleeping 5.75 hours during the overnight shift.  She did not take any PRNs yesterday and refused all of her evening medications.  Also stated intent to refuse AM medications, and all medications thereafter.  She explained, \"You said they're not going to work if I don't put effort into it and I'm not going to.\"  Discussed that, in fact, information which had been previously communicated to her is that medications are only one part of a multifaceted approach to treatment and recovery.  She said, \"I'm leaving today because I'm signing myself out.  My ex- will be here between 6 and 6:30.\"  Discussed with pt that she is under commitment, and that since she is requesting " "discharge and refusing medications, her outpatient  will be contacted to revoke her provisional discharge.  Pt continued to state that she will be discharging.  Advised her that there is also a [facility] order whereby IM Zyprexa will be administered if she refuses PO Seroquel.  She said, \"Me and Channing are a team right now.  The end of the discussion is when I die and he drags me to hell.\"  She contracted for safety in the hospital.  States she has been eating well.  Pt continued to refuse AM medications and was subsequently given IM Zyprexa without protest.         Medications:     Current Facility-Administered Medications   Medication     acetaminophen (TYLENOL) tablet 650 mg     alum & mag hydroxide-simethicone (MAALOX) suspension 30 mL     clonazePAM (klonoPIN) tablet 0.5 mg     hydrOXYzine (ATARAX) tablet 25 mg     lamoTRIgine (LaMICtal) tablet 25 mg     QUEtiapine (SEROquel XR) 24 hr tablet 200 mg    Or     OLANZapine (zyPREXA) injection 10 mg     QUEtiapine ER (SEROquel XR) 24 hr tablet 300 mg    Or     OLANZapine (zyPREXA) injection 10 mg     OLANZapine (zyPREXA) tablet 10 mg    Or     OLANZapine (zyPREXA) injection 10 mg     polyethylene glycol (MIRALAX) Packet 17 g     protriptyline (VIVACTIL) tablet 10 mg     QUEtiapine (SEROquel) tablet 25 mg     ramelteon (ROZEREM) tablet 8 mg     rOPINIRole (REQUIP) tablet 1 mg     traZODone (DESYREL) tablet 50 mg     venlafaxine (EFFEXOR-XR) 24 hr capsule 225 mg             Allergies:   No Known Allergies         Psychiatric Examination:     /78   Pulse 85   Temp 98  F (36.7  C) (Oral)   Resp 16   Ht 1.651 m (5' 5\")   Wt 112.8 kg (248 lb 11.2 oz)   SpO2 98%   BMI 41.39 kg/m      Appearance: awake, alert and slightly unkempt  Attitude:  cooperative  Eye Contact:  fair  Mood:  depressed, anxious  Affect:  mood congruent and intensity is blunted  Speech:  low volume   Language: fluent and intact in English  Psychomotor, Gait, Musculoskeletal:  no " evidence of tardive dyskinesia, dystonia, or tics  Thought Process:  goal oriented, linear  Associations:  no loose associations  Thought Content:  endorses suicidal ideation and auditory hallucinations instructing her to commit suicide via strangulation or overdose following discharge, contracts for safety on unit, denies HI, denies visual hallucinations  Insight:  limited  Judgement:  limited  Oriented to:  month/year, time, person, and place  Attention Span and Concentration:  fair  Recent and Remote Memory:  intact  Fund of Knowledge:  appropriate         Labs:     No results found for this or any previous visit (from the past 24 hour(s)).

## 2021-02-25 ENCOUNTER — MEDICAL CORRESPONDENCE (OUTPATIENT)
Dept: HEALTH INFORMATION MANAGEMENT | Facility: CLINIC | Age: 36
End: 2021-02-25

## 2021-02-25 PROCEDURE — 250N000013 HC RX MED GY IP 250 OP 250 PS 637: Performed by: PSYCHIATRY & NEUROLOGY

## 2021-02-25 PROCEDURE — 99232 SBSQ HOSP IP/OBS MODERATE 35: CPT | Performed by: NURSE PRACTITIONER

## 2021-02-25 PROCEDURE — 124N000002 HC R&B MH UMMC

## 2021-02-25 PROCEDURE — 250N000013 HC RX MED GY IP 250 OP 250 PS 637: Performed by: NURSE PRACTITIONER

## 2021-02-25 PROCEDURE — 87635 SARS-COV-2 COVID-19 AMP PRB: CPT | Performed by: NURSE PRACTITIONER

## 2021-02-25 RX ADMIN — ROPINIROLE HYDROCHLORIDE 1 MG: 1 TABLET, FILM COATED ORAL at 20:48

## 2021-02-25 RX ADMIN — HYDROXYZINE HYDROCHLORIDE 25 MG: 25 TABLET, FILM COATED ORAL at 18:51

## 2021-02-25 RX ADMIN — VENLAFAXINE HYDROCHLORIDE 225 MG: 150 CAPSULE, EXTENDED RELEASE ORAL at 08:11

## 2021-02-25 RX ADMIN — QUETIAPINE FUMARATE 200 MG: 150 TABLET, EXTENDED RELEASE ORAL at 08:08

## 2021-02-25 RX ADMIN — POLYETHYLENE GLYCOL 3350 17 G: 17 POWDER, FOR SOLUTION ORAL at 08:11

## 2021-02-25 RX ADMIN — RAMELTEON 8 MG: 8 TABLET ORAL at 20:48

## 2021-02-25 RX ADMIN — PROTRIPTYLINE HYDROCHLORIDE 10 MG: 10 TABLET ORAL at 18:18

## 2021-02-25 RX ADMIN — LAMOTRIGINE 25 MG: 25 TABLET ORAL at 08:12

## 2021-02-25 RX ADMIN — QUETIAPINE FUMARATE 300 MG: 300 TABLET, EXTENDED RELEASE ORAL at 20:48

## 2021-02-25 ASSESSMENT — MIFFLIN-ST. JEOR: SCORE: 1847.54

## 2021-02-25 ASSESSMENT — ACTIVITIES OF DAILY LIVING (ADL)
DRESS: INDEPENDENT
HYGIENE/GROOMING: INDEPENDENT
ORAL_HYGIENE: INDEPENDENT
DRESS: INDEPENDENT
HYGIENE/GROOMING: INDEPENDENT
LAUNDRY: WITH SUPERVISION
ORAL_HYGIENE: INDEPENDENT
LAUNDRY: WITH SUPERVISION

## 2021-02-25 NOTE — PROGRESS NOTES
"Pt did eat dinner and comply with being out of her room from 5 pm to 9 pm.  She continues to report auditory hallucinations telling her to kill herself.  When asked if she can remain safe she shakes her head 'no' and says softly \"I am talking with satan.\"  She refused her scheduled Seroquel this evening after repeated encouragement to cooperate.  Janice Joel was called as she stated she would not cooperate with the [facility] medication either.  She did eventually allow nurse to administer IM Zyprexa to her right deltoid without incident.  Pt remains in her room sitting quietly on her bed drinking water at this time.    "

## 2021-02-25 NOTE — PLAN OF CARE
Pt kept to herself throughout shift and was isolative to room except when locked out during specific times. Affect was blunted, flat. Mood was depressed, anxious. Pt endorsed AH saying negative things to her. Pt ate meals and was compliant with medications. No SI/SIB noted. Will continue to monitor.

## 2021-02-25 NOTE — PROGRESS NOTES
[facility],  Psychiatric Progress Note      Impression:     Misty Parham is a 35-year-old female admitted to [facility] on 2/10/2021.  She was admitted under ongoing civil commitment MI with Bhargav in [facility] after taking 4-5 tabs of Unisom, 4-5 tabs of Ibuprofen and 4-5 tabs of Tylenol twice daily for 3 months in an attempt to commit suicide.  She was experiencing auditory hallucinations of Satan's voice commanding her to commit suicide.  Provisional discharge was not revoked.  She had stopped taking medications several weeks prior to admission.  Since admission, Seroquel XR, Effexor ER, Rozerem and Protriptyline were restarted.  A Lamictal titration was restarted.  PRNs of Klonopin, Seroquel, Hydroxyzine Zyprexa and Trazodone were initiated.  She began refusing medications on 2/23 but has been taking them as of 2/25.  She was spending nearly all of her time in her room and showed little motivation for treatment, so a room lock out was initiated as it has been during previous hospitalizations.  She continues to report auditory hallucinations of Satan instructing her to commit suicide.  She continues to report suicidal ideation.  Insight is poor.  A 72-hour old was placed 2/24 after she requested discharge, and her PD is in the process of being revoked.         Diagnoses:     Borderline personality disorder  Major depressive disorder, recurrent, severe with psychotic features  Generalized anxiety disorder         Plan:     Medications:  Continue Lamictal titration (due for increase today but will hold off until tomorrow due to her refusal yesterday).  Continue Rozerem 8 mg at HS.  Continue Seroquel  mg in the AM and 300 mg in the evening with a back up of IM Zyprexa available per Bhargav.  Continue Protriptyline 10 mg with dinner.  Continue PRNs of Klonopin, Seroquel, Hydroxyzine, Zyprexa and Trazodone.     Lock out of room from 9:30 AM -  "12 PM, 1 PM - 3 PM and 5 PM to 9 PM.    COVID-19 test today due to exposure to a COVID-19 positive patient 2/22.     She is under commitment MI with Bhargav (Zyprexa, Seroquel, Latuda, Abilify) in [facility].   Plan to revoke provisional discharge as she has now begun refusing medications.  72-hour hold placed at 1115 on 2/24.  Her mother will not allow her to return to her home.  Plan to pursue CAD funding and adult foster care placement.  She has outpatient psychiatry, therapy and case management.      Attestation:  Patient has been seen and evaluated by me, WILLIAN Sánchez CNP  The patient was counseled on nature of illness and treatment plan/options  Care was coordinated with treatment team       Clinical Global Impressions  First:  Considering your total clinical experience with this particular patient population, how severe are the patient's symptoms at this time?: 7 (02/11/21 1842)  Compared to the patient's condition at the START of treatment, this patient's condition is: 4 (02/11/21 1842)  Most recent:  Considering your total clinical experience with this particular patient population, how severe are the patient's symptoms at this time?: 6 (02/19/21 0546)  Compared to the patient's condition at the START of treatment, this patient's condition is: 3 (02/19/21 0546)            Interim History:     The patient's care was discussed with the treatment team and chart notes were reviewed.  Pt was documented as sleeping 6 hours during the overnight shift.  She refused all medications yesterday.  A code green was called on the evening shift to administer IM Zyprexa per Bhargav.  Pt said her mood is \"not the greatest.\"  Her depression is unchanged since admission and anxiety is worse since she has been refusing medications.  She reports continuing suicidal ideation with a plan to strangle herself while hospitalized or overdose outside of the hospital.  She contracts for safety in the hospital.  States " "auditory hallucinations of Channing's voice commanding her to commit suicide have been worse (increased frequency and volume) since she began refusing meds.  Informed pt that her mother told staff that pt cannot reside with her, and that adult foster care placement is being pursued.  She was disappointed but not surprised.  Stated she does plan to begin taking meds, and took her AM medications shortly after our conversation.           Medications:     Current Facility-Administered Medications   Medication     acetaminophen (TYLENOL) tablet 650 mg     alum & mag hydroxide-simethicone (MAALOX) suspension 30 mL     clonazePAM (klonoPIN) tablet 0.5 mg     hydrOXYzine (ATARAX) tablet 25 mg     lamoTRIgine (LaMICtal) tablet 25 mg     QUEtiapine (SEROquel XR) 24 hr tablet 200 mg    Or     OLANZapine (zyPREXA) injection 10 mg     QUEtiapine ER (SEROquel XR) 24 hr tablet 300 mg    Or     OLANZapine (zyPREXA) injection 10 mg     OLANZapine (zyPREXA) tablet 10 mg    Or     OLANZapine (zyPREXA) injection 10 mg     polyethylene glycol (MIRALAX) Packet 17 g     protriptyline (VIVACTIL) tablet 10 mg     QUEtiapine (SEROquel) tablet 25 mg     ramelteon (ROZEREM) tablet 8 mg     rOPINIRole (REQUIP) tablet 1 mg     traZODone (DESYREL) tablet 50 mg     venlafaxine (EFFEXOR-XR) 24 hr capsule 225 mg             Allergies:   No Known Allergies         Psychiatric Examination:     /78   Pulse 95   Temp 98  F (36.7  C) (Oral)   Resp 16   Ht 1.651 m (5' 5\")   Wt 112.8 kg (248 lb 11.2 oz)   SpO2 95%   BMI 41.39 kg/m      Appearance: awake, alert and slightly unkempt  Attitude:  cooperative  Eye Contact:  fair  Mood:  \"not the greatest,\" depressed, anxious  Affect:  mood congruent and intensity is blunted  Speech:  low volume   Language: fluent and intact in English  Psychomotor, Gait, Musculoskeletal:  no evidence of tardive dyskinesia, dystonia, or tics  Thought Process:  goal oriented, linear  Associations:  no loose " associations  Thought Content:  endorses suicidal ideation and auditory hallucinations instructing her to commit suicide via strangulation in the hospital or overdose following discharge, contracts for safety on unit, denies HI, denies visual hallucinations  Insight:  limited  Judgement:  limited  Oriented to:  month/year, time, person, and place  Attention Span and Concentration:  fair  Recent and Remote Memory:  intact  Fund of Knowledge:  appropriate         Labs:     No results found for this or any previous visit (from the past 24 hour(s)).

## 2021-02-25 NOTE — PROGRESS NOTES
Pt was awake intermittently during hs.  Appeared to sleep approximately 6 hours during the night.

## 2021-02-26 PROCEDURE — 250N000013 HC RX MED GY IP 250 OP 250 PS 637: Performed by: NURSE PRACTITIONER

## 2021-02-26 PROCEDURE — 124N000002 HC R&B MH UMMC

## 2021-02-26 PROCEDURE — 250N000011 HC RX IP 250 OP 636: Performed by: NURSE PRACTITIONER

## 2021-02-26 PROCEDURE — 250N000013 HC RX MED GY IP 250 OP 250 PS 637: Performed by: PSYCHIATRY & NEUROLOGY

## 2021-02-26 PROCEDURE — 99232 SBSQ HOSP IP/OBS MODERATE 35: CPT | Performed by: NURSE PRACTITIONER

## 2021-02-26 RX ORDER — LAMOTRIGINE 25 MG/1
50 TABLET ORAL DAILY
Status: DISCONTINUED | OUTPATIENT
Start: 2021-02-26 | End: 2021-03-05

## 2021-02-26 RX ADMIN — ROPINIROLE HYDROCHLORIDE 1 MG: 1 TABLET, FILM COATED ORAL at 20:12

## 2021-02-26 RX ADMIN — OLANZAPINE 10 MG: 10 INJECTION, POWDER, FOR SOLUTION INTRAMUSCULAR at 09:18

## 2021-02-26 RX ADMIN — QUETIAPINE FUMARATE 300 MG: 300 TABLET, EXTENDED RELEASE ORAL at 20:12

## 2021-02-26 RX ADMIN — PROTRIPTYLINE HYDROCHLORIDE 10 MG: 10 TABLET ORAL at 18:07

## 2021-02-26 RX ADMIN — RAMELTEON 8 MG: 8 TABLET ORAL at 20:11

## 2021-02-26 RX ADMIN — POLYETHYLENE GLYCOL 3350 17 G: 17 POWDER, FOR SOLUTION ORAL at 20:30

## 2021-02-26 NOTE — PROGRESS NOTES
[facility],  Psychiatric Progress Note      Impression:     Misty Parham is a 35-year-old female admitted to [facility] on 2/10/2021.  She was admitted under ongoing civil commitment MI with Bhargav in [facility] after taking 4-5 tabs of Unisom, 4-5 tabs of Ibuprofen and 4-5 tabs of Tylenol twice daily for 3 months in an attempt to commit suicide.  She was experiencing auditory hallucinations of Satan's voice commanding her to commit suicide.  Upon admission, provisional discharge was not revoked.  She had stopped taking medications several weeks prior to admission.  Since admission, Seroquel XR, Effexor ER, Rozerem and Protriptyline were restarted.  A Lamictal titration was restarted.  PRNs of Klonopin, Seroquel, Hydroxyzine Zyprexa and Trazodone were initiated.  She began refusing medications on 2/23 and has taken them inconsistently since.  She was spending nearly all of her time in her room and showed little motivation for treatment, so a room lock out was initiated as it has been during previous hospitalizations.  She continues to report auditory hallucinations of Satan instructing her to commit suicide.  She continues to report suicidal ideation.  Insight is poor.  A 72-hour old was placed 2/24 after she requested discharge, and her provisional discharge was revoked.         Diagnoses:     Borderline personality disorder  Major depressive disorder, recurrent, severe with psychotic features  Generalized anxiety disorder         Plan:     Medications:  Increase Lamictal to 50 mg daily; will monitor adherence closely as she has been intermittently refusing meds.  Continue Rozerem 8 mg at HS.  Continue Seroquel  mg in the AM and 300 mg in the evening with a back up of IM Zyprexa available per [facility].  Continue Protriptyline 10 mg with dinner.  Continue PRNs of Klonopin, Seroquel, Hydroxyzine, Zyprexa and Trazodone.     Lock out of room from  "9:30 AM - 12 PM, 1 PM - 3 PM and 5 PM to 9 PM.    She is under commitment MI with Bhargav (Zyprexa, Seroquel, Latuda, Abilify) in [facility].   Her provisional discharge was revoked.  Her mother will not allow her to return to her home.  Plan to pursue [employer] funding and adult foster care placement.  She has outpatient psychiatry, therapy and case management.      Attestation:  Patient has been seen and evaluated by me, WILLIAN Sánchez CNP  The patient was counseled on nature of illness and treatment plan/options  Care was coordinated with treatment team       Clinical Global Impressions  First:  Considering your total clinical experience with this particular patient population, how severe are the patient's symptoms at this time?: 7 (02/11/21 1842)  Compared to the patient's condition at the START of treatment, this patient's condition is: 4 (02/11/21 1842)  Most recent:  Considering your total clinical experience with this particular patient population, how severe are the patient's symptoms at this time?: 6 (02/19/21 0546)  Compared to the patient's condition at the START of treatment, this patient's condition is: 3 (02/19/21 0546)            Interim History:     The patient's care was discussed with the treatment team and chart notes were reviewed.  Pt was documented as sleeping 6.75 hours during the overnight shift and reports sleeping poorly due to noise on the unit.  She spent some time in the milieu yesterday but has not been attending groups and states she does not intend to do so.  She took PRN Hydroxyzine x 1 yesterday.  She refused her AM meds again today and was given IM Zyprexa per Bhargav.  She was minimally engaged during the conversation.  She continues to report auditory hallucinations of Satan's voice commanding her to commit suicide.  She contracts for safety in the hospital.  Mood is depressed and anxiety is high.  She said, \"Me and Channing are a team.  We've been watching the doors, " "making a plan to leave.\"  Again informed her that she is under commitment and disposition and release date will be determined by her outpatient  and the treatment team.         Medications:     Current Facility-Administered Medications   Medication     acetaminophen (TYLENOL) tablet 650 mg     alum & mag hydroxide-simethicone (MAALOX) suspension 30 mL     clonazePAM (klonoPIN) tablet 0.5 mg     hydrOXYzine (ATARAX) tablet 25 mg     lamoTRIgine (LaMICtal) tablet 50 mg     QUEtiapine (SEROquel XR) 24 hr tablet 200 mg    Or     OLANZapine (zyPREXA) injection 10 mg     QUEtiapine ER (SEROquel XR) 24 hr tablet 300 mg    Or     OLANZapine (zyPREXA) injection 10 mg     OLANZapine (zyPREXA) tablet 10 mg    Or     OLANZapine (zyPREXA) injection 10 mg     polyethylene glycol (MIRALAX) Packet 17 g     protriptyline (VIVACTIL) tablet 10 mg     QUEtiapine (SEROquel) tablet 25 mg     ramelteon (ROZEREM) tablet 8 mg     rOPINIRole (REQUIP) tablet 1 mg     traZODone (DESYREL) tablet 50 mg     venlafaxine (EFFEXOR-XR) 24 hr capsule 225 mg             Allergies:   No Known Allergies         Psychiatric Examination:     /78 (BP Location: Right arm)   Pulse 89   Temp 98.2  F (36.8  C) (Oral)   Resp 16   Ht 1.651 m (5' 5\")   Wt 116.2 kg (256 lb 1.6 oz)   SpO2 95%   BMI 42.62 kg/m      Appearance: awake, alert and slightly unkempt  Attitude: minimally engaged  Eye Contact:  minimal  Mood:  \"tired,\" anxious, depressed  Affect:  mood congruent and intensity is blunted  Speech:  low volume   Language: fluent and intact in English  Psychomotor, Gait, Musculoskeletal:  no evidence of tardive dyskinesia, dystonia, or tics  Thought Process:  goal oriented, linear  Associations:  no loose associations  Thought Content:  endorses suicidal ideation and auditory hallucinations instructing her to commit suicide via strangulation in the hospital or overdose following discharge, contracts for safety on unit, denies HI, denies " visual hallucinations  Insight:  limited  Judgement:  limited  Oriented to:  month/year, time, person, and place  Attention Span and Concentration:  fair  Recent and Remote Memory:  intact  Fund of Knowledge:  appropriate         Labs:     Recent Results (from the past 24 hour(s))   Asymptomatic SARS-CoV-2 COVID-19 Virus (Coronavirus) by PCR    Collection Time: 02/25/21  2:00 PM    Specimen: Nasopharyngeal   Result Value Ref Range    SARS-CoV-2 Virus Specimen Source Nasopharyngeal     SARS-CoV-2 PCR Result NEGATIVE     SARS-CoV-2 PCR Comment (Note)

## 2021-02-26 NOTE — PLAN OF CARE
BEHAVIORAL TEAM DISCUSSION    Participants: Hina Morris, KARLOS; Sarah Evangelista and Alicia Coto, BAHMAN's; Nayeli SCHWARTZ  Progress: pt has been refusing medications.  Her  revoked her PD in response to pt's current behaviors.  Anticipated length of stay: TBD, likely will go to adult foster care now as mother has refused for pt to return to live with her.  Continued Stay Criteria/Rationale: pt needs further evaluation  Medical/Physical: uneventful  Precautions:   Behavioral Orders   Procedures     Code 1 - Restrict to Unit     Routine Programming     As clinically indicated     Status 15     Every 15 minutes.     Suicide precautions     Patients on Suicide Precautions should have a Combination Diet ordered that includes a Diet selection(s) AND a Behavioral Tray selection for Safe Tray - with utensils, or Safe Tray - NO utensils       Plan: continue with psychiatry and OP CM is trying to locate Adult Foster Care  Rationale for change in precautions or plan: pt will need structured environment post acute care

## 2021-02-26 NOTE — PLAN OF CARE
Problem: Mood Impairment (Psychotic Signs/Symptoms)  Goal: Improved Mood Symptoms (Psychotic Signs/Symptoms)  Outcome: No Change  Note: Pt spent most of this shift in the living area either watching TV intermittently or just look down. Pt rated anxiety at 7/10 and depression at 5/10. She was offered Hydroxyzine and later said her depression and anxiety had dropped to 4/10. Pt denies any SI/VH/HI but endorses AH, and when asked whether the voices said anything to her she said it was her the devil talking to to her. Pt did ate her dinner and had no other concerns this shift.

## 2021-02-26 NOTE — PLAN OF CARE
"Pt was isolative to room throughout the day. This RN brought pt's am medications but pt refused to take them, stated \"I'm too tired.\" Pt continued to refuse despite being reminded that she is jarvised for seroquel and would have to get an IM if she continued to refuse. Pt also said \"Me and Channing are a team. We're working on a plan.\" Pt also stated \"I've been dealing with that lady screaming all morning. I'm too tired.\" referring to a loud female pt in the [place]. Pt is responding to , reports having conversations with Channing. IM zyprexa was given in pt's R deltoid, continued to refuse the shot but took it without incident. No SI/SIB noted. Will continue to monitor.  "

## 2021-02-26 NOTE — PROGRESS NOTES
Pt appeared to sleep approximately 6.75  Hours during the night, on status 15 minute safety checks.

## 2021-02-27 PROCEDURE — 250N000013 HC RX MED GY IP 250 OP 250 PS 637: Performed by: NURSE PRACTITIONER

## 2021-02-27 PROCEDURE — 124N000002 HC R&B MH UMMC

## 2021-02-27 PROCEDURE — 250N000013 HC RX MED GY IP 250 OP 250 PS 637: Performed by: PSYCHIATRY & NEUROLOGY

## 2021-02-27 PROCEDURE — 250N000011 HC RX IP 250 OP 636: Performed by: NURSE PRACTITIONER

## 2021-02-27 RX ADMIN — OLANZAPINE 10 MG: 10 INJECTION, POWDER, FOR SOLUTION INTRAMUSCULAR at 19:32

## 2021-02-27 RX ADMIN — OLANZAPINE 10 MG: 10 INJECTION, POWDER, FOR SOLUTION INTRAMUSCULAR at 09:25

## 2021-02-27 ASSESSMENT — ACTIVITIES OF DAILY LIVING (ADL)
HYGIENE/GROOMING: INDEPENDENT
ORAL_HYGIENE: INDEPENDENT
HYGIENE/GROOMING: INDEPENDENT
ORAL_HYGIENE: INDEPENDENT
LAUNDRY: WITH SUPERVISION
DRESS: INDEPENDENT
DRESS: INDEPENDENT
LAUNDRY: WITH SUPERVISION

## 2021-02-27 NOTE — PLAN OF CARE
Pt. Refusing all attempts of staff engaging pt.  Pt. Refusing her medications, offered three different times.  Pt. Given an IM of Zyprexa according to the [facility].  Pt. Refusing to cooperate with being out of her room.  Pt. Appears angry, irritable overall.  Pt. Refuses to answer staff when asked if she feels safe on the unit,  if she is experiencing suicidal ideation.  Pt. Is being observed closely.

## 2021-02-27 NOTE — PROGRESS NOTES
Pt appears to be sleeping most of night. Woke up once to get water and went right to bed. No issues noted/reported. Will continue to monitor.

## 2021-02-27 NOTE — PROGRESS NOTES
"Pt was seen sitting quietly in her room but came to the lounge on her own to watch tv with others at 5 pm.  She has been cooperative with being out of her room from 5 pm to 9 pm.  Pt however did not attend groups despite encouragement.  Pt is also unhappy about the court decision to revoke her provisional discharge. Pt says she is sad that she won't be able to see her kids before they leave with their grandmother to [facility] this upcoming month.  \"They will be there for a year.\"     She continues to report jackie's voice telling her to kill herself. She endorses feelings of hopeless and says she has given in to jackie.  Pt says she is not afraid of the voices and says \"He is not always mean.\"  She currently denies any intent to harm self.  Pt took her scheduled medications this evening with minimal encouragement.  Denies any concerns with medication s/e at this time.        "

## 2021-02-28 PROCEDURE — 250N000013 HC RX MED GY IP 250 OP 250 PS 637: Performed by: NURSE PRACTITIONER

## 2021-02-28 PROCEDURE — 124N000002 HC R&B MH UMMC

## 2021-02-28 PROCEDURE — 250N000013 HC RX MED GY IP 250 OP 250 PS 637: Performed by: PSYCHIATRY & NEUROLOGY

## 2021-02-28 RX ADMIN — PROTRIPTYLINE HYDROCHLORIDE 10 MG: 10 TABLET ORAL at 17:25

## 2021-02-28 RX ADMIN — ROPINIROLE HYDROCHLORIDE 1 MG: 1 TABLET, FILM COATED ORAL at 21:23

## 2021-02-28 RX ADMIN — QUETIAPINE FUMARATE 200 MG: 150 TABLET, EXTENDED RELEASE ORAL at 08:37

## 2021-02-28 RX ADMIN — VENLAFAXINE HYDROCHLORIDE 225 MG: 150 CAPSULE, EXTENDED RELEASE ORAL at 08:37

## 2021-02-28 RX ADMIN — LAMOTRIGINE 50 MG: 25 TABLET ORAL at 08:37

## 2021-02-28 RX ADMIN — QUETIAPINE FUMARATE 300 MG: 300 TABLET, EXTENDED RELEASE ORAL at 20:03

## 2021-02-28 RX ADMIN — POLYETHYLENE GLYCOL 3350 17 G: 17 POWDER, FOR SOLUTION ORAL at 08:37

## 2021-02-28 RX ADMIN — RAMELTEON 8 MG: 8 TABLET ORAL at 21:23

## 2021-02-28 ASSESSMENT — ACTIVITIES OF DAILY LIVING (ADL)
LAUNDRY: WITH SUPERVISION
HYGIENE/GROOMING: INDEPENDENT
DRESS: INDEPENDENT
ORAL_HYGIENE: INDEPENDENT
HYGIENE/GROOMING: INDEPENDENT
DRESS: INDEPENDENT
LAUNDRY: WITH SUPERVISION
ORAL_HYGIENE: INDEPENDENT

## 2021-02-28 NOTE — PLAN OF CARE
Pt. willing to engage in a 1:1 with staff.  Pt. agreed to accept her morning medications without incident.  Pt. also came to the lounge for breakfast.  Pt. states that the auditory hallucination is less loud; pt. states that she is not suicidal.  Overall, pt. seems irritable with a blunted, flat affect.

## 2021-02-28 NOTE — PROGRESS NOTES
"Pt spent several hours in the lounge quietly watching tv.  She refused scheduled medications when offered by RN.  Janice Maldonado was called as Pt was refusing to cooperate with [facility] IM Zyprexa.  \"Channing says 'No'! \".  Pt was assisted by staff to lay on her stomach and IM Zyprexa was administered without incident.    She remains in her room sitting on her bed at this time.    "

## 2021-03-01 PROCEDURE — 99232 SBSQ HOSP IP/OBS MODERATE 35: CPT | Performed by: NURSE PRACTITIONER

## 2021-03-01 PROCEDURE — 250N000013 HC RX MED GY IP 250 OP 250 PS 637: Performed by: NURSE PRACTITIONER

## 2021-03-01 PROCEDURE — 250N000011 HC RX IP 250 OP 636: Performed by: NURSE PRACTITIONER

## 2021-03-01 PROCEDURE — 124N000002 HC R&B MH UMMC

## 2021-03-01 PROCEDURE — 250N000013 HC RX MED GY IP 250 OP 250 PS 637: Performed by: PSYCHIATRY & NEUROLOGY

## 2021-03-01 RX ADMIN — POLYETHYLENE GLYCOL 3350 17 G: 17 POWDER, FOR SOLUTION ORAL at 08:25

## 2021-03-01 RX ADMIN — PROTRIPTYLINE HYDROCHLORIDE 10 MG: 10 TABLET ORAL at 18:09

## 2021-03-01 RX ADMIN — OLANZAPINE 10 MG: 10 INJECTION, POWDER, FOR SOLUTION INTRAMUSCULAR at 19:57

## 2021-03-01 RX ADMIN — QUETIAPINE FUMARATE 200 MG: 150 TABLET, EXTENDED RELEASE ORAL at 08:25

## 2021-03-01 RX ADMIN — VENLAFAXINE HYDROCHLORIDE 225 MG: 150 CAPSULE, EXTENDED RELEASE ORAL at 08:24

## 2021-03-01 RX ADMIN — LAMOTRIGINE 50 MG: 25 TABLET ORAL at 08:25

## 2021-03-01 ASSESSMENT — ACTIVITIES OF DAILY LIVING (ADL)
ORAL_HYGIENE: INDEPENDENT
ORAL_HYGIENE: INDEPENDENT;PROMPTS
LAUNDRY: WITH SUPERVISION
HYGIENE/GROOMING: INDEPENDENT
DRESS: INDEPENDENT
LAUNDRY: WITH SUPERVISION
HYGIENE/GROOMING: INDEPENDENT;PROMPTS
DRESS: INDEPENDENT

## 2021-03-01 NOTE — PLAN OF CARE
"..Pt was mostly isolative to room, sleeping and napping, minimally social with peers when out of room. Affect was blunted, flat. Mood was depressed, anxious. Pt continues to endorse AH of Channing talking to her. No SI/SIB noted. Will continue to monitor.    Pt was not compliant with her HS scheduled medications and said \"not right now, I have to talk it over with Channing.\" This writer reminded pt that she is jarvised for seroquel and would have to get the IM of zyprexa instead. Pt did not respond when asked again if she would take the PO seroquel so this writer gave the IM without any resistance from the pt.     "

## 2021-03-01 NOTE — PROGRESS NOTES
"Pt has been sitting in the lounge as encouraged.  She reports mood is \"down\" and she continues to hear Satan's voice but that it is less often.  She comments that she can feel him wrapping his arms around her at times.  \"It's like he's dragging me to hell.\"  When asked if she feels frightened by this Pt replies \"No.\"  Pt states that she feels hopeless and that she has \"given up a long time ago\".  She has been cooperative with taking her medications today but says she doesn't think they help much.  She reports that she has had ECT in the past but it affected her memory.     Staff encouraged her to participate in group/unit activities, make phone calls to her family, etc..  She agreed that the next goal may be to attend groups as she has had DBT and CBT in the past.  Pt has been more appropriate this shift.  Can be seen sitting in the lounge watching tv with others until 9:30 pm today and took a shower at 9 pm.    "

## 2021-03-01 NOTE — PROGRESS NOTES
[facility],  Psychiatric Progress Note      Impression:     Misty Parham is a 35-year-old female admitted to [facility] on 2/10/2021.  She was admitted under ongoing civil commitment MI with Bhargav in [facility] after taking 4-5 tabs of Unisom, 4-5 tabs of Ibuprofen and 4-5 tabs of Tylenol twice daily for 3 months in an attempt to commit suicide.  She was experiencing auditory hallucinations of Satan's voice commanding her to commit suicide.  Upon admission, provisional discharge was not revoked.  She had stopped taking medications several weeks prior to admission.  Since admission, Seroquel XR, Effexor ER, Rozerem and Protriptyline were restarted.  A Lamictal titration was restarted.  PRNs of Klonopin, Seroquel, Hydroxyzine Zyprexa and Trazodone were initiated.  She began refusing medications on 2/23 and has taken them inconsistently since.  She was spending nearly all of her time in her room and showed little motivation for treatment, so a room lock out was initiated as it has been during previous hospitalizations.  She continues to report auditory hallucinations of Satan instructing her to commit suicide.  She continues to report suicidal ideation.  Insight is poor.  A 72-hour old was placed 2/24 after she requested discharge, and her provisional discharge was revoked.         Diagnoses:     Borderline personality disorder  Major depressive disorder, recurrent, severe with psychotic features  Generalized anxiety disorder         Plan:     Medications:  Continue Lamictal 50 mg daily; will monitor adherence closely as she has been intermittently refusing meds.  Continue Rozerem 8 mg at HS.  Continue Seroquel  mg in the AM and 300 mg in the evening with a back up of IM Zyprexa available per Bhargav.  Continue Protriptyline 10 mg with dinner.  Continue PRNs of Klonopin, Seroquel, Hydroxyzine, Zyprexa and Trazodone.     Lock out of room from  "9:30 AM - 12 PM, 1 PM - 3 PM and 5 PM to 9 PM.    She is under commitment MI with Bhargav (Zyprexa, Seroquel, Latuda, Abilify) in [facility]. Her provisional discharge was revoked.  Her mother will not allow her to return to her home.  Plan to pursue [employer] funding and adult foster care placement.  She has outpatient psychiatry, therapy and case management.      Attestation:  Patient has been seen and evaluated by me, WILLIAN Sánchez CNP  The patient was counseled on nature of illness and treatment plan/options  Care was coordinated with treatment team       Clinical Global Impressions  First:  Considering your total clinical experience with this particular patient population, how severe are the patient's symptoms at this time?: 7 (02/11/21 1842)  Compared to the patient's condition at the START of treatment, this patient's condition is: 4 (02/11/21 1842)  Most recent:  Considering your total clinical experience with this particular patient population, how severe are the patient's symptoms at this time?: 7 (03/01/21 1050)  Compared to the patient's condition at the START of treatment, this patient's condition is: 4 (03/01/21 1050)          Interim History:     The patient's care was discussed with the treatment team and chart notes were reviewed.  Pt was documented as sleeping 6, 7 and 7.5 hours during the weekend overnight shifts.  She refused medications on Friday and Saturday mornings and on Saturday evening.  She has been in the milieu watching TV and movies in the evenings.  Provider spoke with her after she refused AM meds today.  She said that she is \"thinking about\" taking medications and that she is \"discussing it with Satan.\"  She admits that she would rather take PO Seroquel XR than IM Zyprexa as PO Seroquel is more beneficial and has fewer side effects.  Discussed that repeated refusal of Lamictal will result in having to lower the dose and delay the planned titration.  Shortly after " "provider talked to her, she did take AM meds.  She said her suicidal thoughts \"come and go, sometimes better than others.\"  Auditory hallucinations of Channing's voice are \"the same.\"  Her anxiety is \"up and down.\"  Sleep is \"off and on.\"  States her children are going to [facility] for a year to live with their paternal grandparents.  \"I'm going to miss them but I'm not that good to them so hopefully they'll have it easier over there.\"  She was lying in bed and appeared minimally engaged.           Medications:     Current Facility-Administered Medications   Medication     acetaminophen (TYLENOL) tablet 650 mg     alum & mag hydroxide-simethicone (MAALOX) suspension 30 mL     clonazePAM (klonoPIN) tablet 0.5 mg     hydrOXYzine (ATARAX) tablet 25 mg     lamoTRIgine (LaMICtal) tablet 50 mg     QUEtiapine (SEROquel XR) 24 hr tablet 200 mg    Or     OLANZapine (zyPREXA) injection 10 mg     QUEtiapine ER (SEROquel XR) 24 hr tablet 300 mg    Or     OLANZapine (zyPREXA) injection 10 mg     OLANZapine (zyPREXA) tablet 10 mg    Or     OLANZapine (zyPREXA) injection 10 mg     polyethylene glycol (MIRALAX) Packet 17 g     protriptyline (VIVACTIL) tablet 10 mg     QUEtiapine (SEROquel) tablet 25 mg     ramelteon (ROZEREM) tablet 8 mg     rOPINIRole (REQUIP) tablet 1 mg     traZODone (DESYREL) tablet 50 mg     venlafaxine (EFFEXOR-XR) 24 hr capsule 225 mg             Allergies:   No Known Allergies         Psychiatric Examination:     /88 (BP Location: Left arm)   Pulse 83   Temp 97.2  F (36.2  C) (Tympanic)   Resp 16   Ht 1.651 m (5' 5\")   Wt 116.2 kg (256 lb 1.6 oz)   SpO2 98%   BMI 42.62 kg/m      Appearance: awake, somnolent and slightly unkempt  Attitude: minimally engaged  Eye Contact:  minimal  Mood:  \"okay,\" depressed, anxiety is \"up and down\"  Affect:  mood congruent and intensity is blunted  Speech:  low volume   Language: fluent and intact in English  Psychomotor, Gait, Musculoskeletal:  no evidence of tardive " dyskinesia, dystonia, or tics  Thought Process:  goal oriented, linear  Associations:  no loose associations  Thought Content:  endorses suicidal ideation and auditory hallucinations instructing her to commit suicide via strangulation in the hospital or overdose following discharge, contracts for safety on unit, denies HI, denies visual hallucinations  Insight:  limited  Judgement:  limited  Oriented to:  month/year, time, person, and place   Attention Span and Concentration:  fair  Recent and Remote Memory:  intact  Fund of Knowledge:  appropriate         Labs:     No results found for this or any previous visit (from the past 24 hour(s)).

## 2021-03-01 NOTE — PROGRESS NOTES
Pt appears to be sleeping most of the shift. No issues noted/reported. Pt on status 15 checks. Will continue to monitor.

## 2021-03-01 NOTE — PLAN OF CARE
"Pt. willing to engage in a short 1:1.  Pt. states that she is experiencing auditory hallucinations of \"Satan.\"  Pt. states on a scale of 1-10 with 10 being high, the auditory hallucination is a 7 with how intense they currently are.  Pt. States that at this time, she is safe on the unit and not suicidal.  Pt. With encouragement accepted her schedule medications.  Pt. Is refusing to get out of bed, spend time in the lounge, or go to any groups.  Pt. Only engages with staff after much encouragement and then for only a short time.    "

## 2021-03-02 PROCEDURE — 99232 SBSQ HOSP IP/OBS MODERATE 35: CPT | Performed by: NURSE PRACTITIONER

## 2021-03-02 PROCEDURE — 124N000002 HC R&B MH UMMC

## 2021-03-02 PROCEDURE — 250N000013 HC RX MED GY IP 250 OP 250 PS 637: Performed by: NURSE PRACTITIONER

## 2021-03-02 RX ORDER — OLANZAPINE 10 MG/2ML
10 INJECTION, POWDER, FOR SOLUTION INTRAMUSCULAR EVERY MORNING
Status: DISCONTINUED | OUTPATIENT
Start: 2021-03-03 | End: 2021-04-07 | Stop reason: HOSPADM

## 2021-03-02 RX ORDER — QUETIAPINE 300 MG/1
300 TABLET, FILM COATED, EXTENDED RELEASE ORAL EVERY MORNING
Status: DISCONTINUED | OUTPATIENT
Start: 2021-03-03 | End: 2021-04-07 | Stop reason: HOSPADM

## 2021-03-02 RX ADMIN — VENLAFAXINE HYDROCHLORIDE 225 MG: 150 CAPSULE, EXTENDED RELEASE ORAL at 08:33

## 2021-03-02 RX ADMIN — QUETIAPINE FUMARATE 200 MG: 150 TABLET, EXTENDED RELEASE ORAL at 08:33

## 2021-03-02 RX ADMIN — LAMOTRIGINE 50 MG: 25 TABLET ORAL at 08:33

## 2021-03-02 RX ADMIN — QUETIAPINE FUMARATE 300 MG: 300 TABLET, EXTENDED RELEASE ORAL at 20:15

## 2021-03-02 RX ADMIN — POLYETHYLENE GLYCOL 3350 17 G: 17 POWDER, FOR SOLUTION ORAL at 08:34

## 2021-03-02 ASSESSMENT — ACTIVITIES OF DAILY LIVING (ADL)
ORAL_HYGIENE: PROMPTS
DRESS: INDEPENDENT
HYGIENE/GROOMING: PROMPTS
ORAL_HYGIENE: INDEPENDENT
DRESS: PROMPTS
HYGIENE/GROOMING: INDEPENDENT
LAUNDRY: WITH SUPERVISION

## 2021-03-02 NOTE — PLAN OF CARE
Pt took her meds after much prompting this a.m. Pt then came out of her room after writer told her she needed to be. Pt refused to talk to writer. Pt refused to go to groups. Pt just sits in the lounge staring into space or at the wall. Pt doesn't talk or interact with any pt's or staff.

## 2021-03-02 NOTE — PLAN OF CARE
Pt appeared to sleep most of shift. No safety issues or concerns reported. Will continue to monitor.

## 2021-03-03 PROCEDURE — 250N000011 HC RX IP 250 OP 636: Performed by: NURSE PRACTITIONER

## 2021-03-03 PROCEDURE — 250N000013 HC RX MED GY IP 250 OP 250 PS 637: Performed by: PSYCHIATRY & NEUROLOGY

## 2021-03-03 PROCEDURE — 99232 SBSQ HOSP IP/OBS MODERATE 35: CPT | Performed by: NURSE PRACTITIONER

## 2021-03-03 PROCEDURE — 250N000013 HC RX MED GY IP 250 OP 250 PS 637: Performed by: NURSE PRACTITIONER

## 2021-03-03 PROCEDURE — 124N000002 HC R&B MH UMMC

## 2021-03-03 RX ADMIN — POLYETHYLENE GLYCOL 3350 17 G: 17 POWDER, FOR SOLUTION ORAL at 08:35

## 2021-03-03 RX ADMIN — OLANZAPINE 10 MG: 10 INJECTION, POWDER, FOR SOLUTION INTRAMUSCULAR at 20:47

## 2021-03-03 RX ADMIN — OLANZAPINE 10 MG: 10 INJECTION, POWDER, FOR SOLUTION INTRAMUSCULAR at 09:38

## 2021-03-03 ASSESSMENT — ACTIVITIES OF DAILY LIVING (ADL)
ORAL_HYGIENE: INDEPENDENT
ORAL_HYGIENE: INDEPENDENT
LAUNDRY: WITH SUPERVISION
DRESS: INDEPENDENT
LAUNDRY: WITH SUPERVISION
HYGIENE/GROOMING: INDEPENDENT
HYGIENE/GROOMING: INDEPENDENT
DRESS: INDEPENDENT

## 2021-03-03 NOTE — PROGRESS NOTES
[facility],  Psychiatric Progress Note      Impression:     Misty Parham is a 35-year-old female admitted to [facility] on 2/10/2021.  She was admitted under ongoing civil commitment MI with Bhargav in [facility] after taking 4-5 tabs of Unisom, 4-5 tabs of Ibuprofen and 4-5 tabs of Tylenol twice daily for 3 months in an attempt to commit suicide.  She was experiencing auditory hallucinations of Satan's voice commanding her to commit suicide.  Upon admission, provisional discharge was not revoked.  She had stopped taking medications several weeks prior to admission.  Since admission, Seroquel XR, Effexor ER, Rozerem and Protriptyline were restarted.  A Lamictal titration was restarted.  PRNs of Klonopin, Seroquel, Hydroxyzine Zyprexa and Trazodone were initiated.  She began refusing medications on 2/23 and has taken them inconsistently since.  She was spending nearly all of her time in her room and showed little motivation for treatment, so a room lock out was initiated as it has been during previous hospitalizations.  She continues to report auditory hallucinations of Satan instructing her to commit suicide, and to not take her medications.  She continues to report suicidal ideation.  Insight is poor.  A 72-hour old was placed 2/24 after she requested discharge, and her provisional discharge was revoked.         Diagnoses:     Borderline personality disorder  Major depressive disorder, recurrent, severe with psychotic features  Generalized anxiety disorder         Plan:     Medications:  Continue Lamictal 50 mg daily; will monitor adherence closely as she has been intermittently refusing meds.  Continue Rozerem 8 mg at HS.  Continue Seroquel  mg BID with a back up of IM Zyprexa available per Bhargav.  Continue Protriptyline 10 mg with dinner.  Continue PRNs of Klonopin, Seroquel, Hydroxyzine, Zyprexa and Trazodone.     Lock out of room from  "9:30 AM - 12 PM, 1 PM - 3 PM and 5 PM to 9 PM.    She is under commitment MI with Bhargav (Zyprexa, Seroquel, Latuda, Abilify) in [facility]. Her provisional discharge was revoked.  Her mother will not allow her to return to her home.  Plan to pursue [employer] funding and adult foster care placement.  She has outpatient psychiatry, therapy and case management.      Attestation:  Patient has been seen and evaluated by me, WILLIAN Sánchez CNP  The patient was counseled on nature of illness and treatment plan/options  Care was coordinated with treatment team       Clinical Global Impressions  First:  Considering your total clinical experience with this particular patient population, how severe are the patient's symptoms at this time?: 7 (02/11/21 1842)  Compared to the patient's condition at the START of treatment, this patient's condition is: 4 (02/11/21 1842)  Most recent:  Considering your total clinical experience with this particular patient population, how severe are the patient's symptoms at this time?: 7 (03/01/21 1050)  Compared to the patient's condition at the START of treatment, this patient's condition is: 4 (03/01/21 1050)          Interim History:     The patient's care was discussed with the treatment team and chart notes were reviewed.  Pt refused her evening medications with the exception of Seroquel XR yesterday.  She refused all AM medications and received IM Zyprexa per Bhargav.  She has been partially compliant with room lock out.  She admits that the acuity and noise in the milieu is frustrating to her but staunchly states she has no intention of attending groups.  She said her mood is \"bad.\"  She reports that suicidal ideation, depressive symptoms and auditory hallucinations have worsened since she has been inconsistently adherent to medications, yet stated she doesn't plan to take them because \"the meds won't help if I don't want to get better.\"  She contracts for safety on the unit.  " "         Medications:     Current Facility-Administered Medications   Medication     acetaminophen (TYLENOL) tablet 650 mg     alum & mag hydroxide-simethicone (MAALOX) suspension 30 mL     clonazePAM (klonoPIN) tablet 0.5 mg     hydrOXYzine (ATARAX) tablet 25 mg     lamoTRIgine (LaMICtal) tablet 50 mg     QUEtiapine ER (SEROquel XR) 24 hr tablet 300 mg    Or     OLANZapine (zyPREXA) injection 10 mg     QUEtiapine ER (SEROquel XR) 24 hr tablet 300 mg    Or     OLANZapine (zyPREXA) injection 10 mg     OLANZapine (zyPREXA) tablet 10 mg    Or     OLANZapine (zyPREXA) injection 10 mg     polyethylene glycol (MIRALAX) Packet 17 g     protriptyline (VIVACTIL) tablet 10 mg     QUEtiapine (SEROquel) tablet 25 mg     ramelteon (ROZEREM) tablet 8 mg     rOPINIRole (REQUIP) tablet 1 mg     traZODone (DESYREL) tablet 50 mg     venlafaxine (EFFEXOR-XR) 24 hr capsule 225 mg             Allergies:   No Known Allergies         Psychiatric Examination:     /69 (BP Location: Left arm)   Pulse 96   Temp 97.8  F (36.6  C) (Oral)   Resp 16   Ht 1.651 m (5' 5\")   Wt 116.2 kg (256 lb 1.6 oz)   SpO2 98%   BMI 42.62 kg/m      Appearance: awake, alert and slightly unkempt  Attitude:  cooperative  Eye Contact:  fair  Mood:  \"bad\"  Affect:  intensity is blunted  Speech:  low volume   Language: fluent and intact in English  Psychomotor, Gait, Musculoskeletal:  no evidence of tardive dyskinesia, dystonia, or tics  Thought Process:  goal oriented, linear, illogical  Associations:  no loose associations  Thought Content:  endorses suicidal ideation and auditory hallucinations instructing her to commit suicide via strangulation in the hospital or overdose following discharge, contracts for safety on unit, denies HI, denies visual hallucinations  Insight:  limited  Judgement:  limited  Oriented to:  month/year, time, person, and place   Attention Span and Concentration:  fair  Recent and Remote Memory:  intact  Fund of Knowledge:  " appropriate         Labs:     No results found for this or any previous visit (from the past 24 hour(s)).

## 2021-03-03 NOTE — PLAN OF CARE
"  Problem: Mood Impairment (Psychotic Signs/Symptoms)  Goal: Improved Mood Symptoms (Psychotic Signs/Symptoms)  Outcome: No Change     Pt spent shift in room or in lounge when her room door was locked. She came out of her room before dinner. Pt refused to take her 1800 medication. Writer attempted twice to get pt to take it, pt was adamant that she wouldn't take medications and stated \"I need those to be discontinued.\" Pt continued to sit in the lounge, but was not observed engaging with peers. Pt was fidgeting, rocking her leg back and forth each time this writer saw her. Pt told writer she would also refuse to take her PM medications. Writer informed pt that an IM would need to be given if she refuses oral medications. Pt reiterated \"I'm not taking anything.\" After much encouragement, pt agreed to take her seroquel. Pt states that she has SI \"off and on.\" She has no plan or intent. Pt endorses hearing voices. She states that she hears Channing, who tells her not to take her medications. \"Well, it's Satan, so I have to listen to what he says. I feel his arms wrapped around me, ready to drag me to hell.\" Pt told writer that this is frightening to her, writer suggested that medications might quiet the voice, pt continued to talk about Satelizabeth's voice in her head. \"I don't know why people want to help me. All there is that's left for me to do is die and get dragged to hell. It's what's going to happen.\" Writer offered pt headphones, pt accepted. Pt declined any other interventions.   "

## 2021-03-03 NOTE — PLAN OF CARE
Pt appeared to sleep most of the shift. Pt out of room once for water. No safety issues or concerns reported. Will continue to monitor.

## 2021-03-03 NOTE — PLAN OF CARE
"AH of Channing telling her to hurt herself. Affect was blunted, flat, tense. Mood was anxious, depressed, irritable. Pt refused all of her PO medications and was informed that she would have to get an IM of zyprexa if she refused the seroquel. Pt said \"No. It's not fair. Give it to the , he needs the shot. It's not fair.\" Code green was called and staff held pt's arms and removed the blanket she was grabbing to cover herself. IM zyprexa given without further incident. Pt is isolative, withdrawn, not social with peers or attending any groups. Will continue to monitor.   "

## 2021-03-04 PROCEDURE — 250N000011 HC RX IP 250 OP 636: Performed by: NURSE PRACTITIONER

## 2021-03-04 PROCEDURE — 124N000002 HC R&B MH UMMC

## 2021-03-04 PROCEDURE — 99232 SBSQ HOSP IP/OBS MODERATE 35: CPT | Performed by: NURSE PRACTITIONER

## 2021-03-04 RX ADMIN — OLANZAPINE 10 MG: 10 INJECTION, POWDER, FOR SOLUTION INTRAMUSCULAR at 10:03

## 2021-03-04 ASSESSMENT — ACTIVITIES OF DAILY LIVING (ADL)
ORAL_HYGIENE: INDEPENDENT;PROMPTS
DRESS: INDEPENDENT
HYGIENE/GROOMING: INDEPENDENT
LAUNDRY: WITH SUPERVISION
ORAL_HYGIENE: INDEPENDENT
LAUNDRY: WITH SUPERVISION
HYGIENE/GROOMING: INDEPENDENT;PROMPTS
DRESS: INDEPENDENT

## 2021-03-04 NOTE — PLAN OF CARE
Pt. Remains in her room until 1057.  Pt. Refuses to attend groups, engages with staff with much encouragement; pt. Answers in a soft voice with one to two word responses.  Pt. Refused morning medications, given several opportunities to accept her medications and much encouragement.  IM Zyprexa given per [facility] order.  Pt. did not resist the injection.  Nursing staff checks on the pt. frequently throughout the shift.  Pt. states that she is hearing the male voice of  Augustus telling her that she should kill herself, that she has made a lot of mistakes.  When this staff asks the pt. what mistakes she believes she has made, the pt. Reports too many to state.  Pt. locked out of her room per psychiatric provider for part of the shift.  Pt. encouraged to go to groups, interact with other pts. and and staff.  Pt. appears irritable, sullen, depressed.

## 2021-03-04 NOTE — PROGRESS NOTES
[facility],  Psychiatric Progress Note      Impression:     Misty Parham is a 35-year-old female admitted to [facility] on 2/10/2021.  She was admitted under ongoing civil commitment MI with Bhargav in [facility] after taking 4-5 tabs of Unisom, 4-5 tabs of Ibuprofen and 4-5 tabs of Tylenol twice daily for 3 months in an attempt to commit suicide.  She was experiencing auditory hallucinations of Satan's voice commanding her to commit suicide.  Upon admission, provisional discharge was not revoked.  She had stopped taking medications several weeks prior to admission.  Since admission, Seroquel XR, Effexor ER, Rozerem and Protriptyline were restarted.  A Lamictal titration was restarted.  PRNs of Klonopin, Seroquel, Hydroxyzine Zyprexa and Trazodone were initiated.  She began refusing medications on 2/23 and has taken them inconsistently since.  She was spending nearly all of her time in her room and showed little motivation for treatment, so a room lock out was initiated as it has been during previous hospitalizations.  She continues to report auditory hallucinations of Satan instructing her to commit suicide, and to not take her medications.  She continues to report suicidal ideation.  Insight is poor.  A 72-hour old was placed 2/24 after she requested discharge, and her provisional discharge was revoked.         Diagnoses:     Borderline personality disorder  Major depressive disorder, recurrent, severe with psychotic features  Generalized anxiety disorder         Plan:     Medications:  Continue Lamictal 50 mg daily; will monitor adherence closely as she has been intermittently refusing meds.  Continue Rozerem 8 mg at HS.  Continue Seroquel  mg BID with a back up of IM Zyprexa available per Bhargav.  Continue Protriptyline 10 mg with dinner.  Continue PRNs of Klonopin, Seroquel, Hydroxyzine, Zyprexa and Trazodone.     Lock out of room from  "9:30 AM - 12 PM, 1 PM - 3 PM and 5 PM to 9 PM.    She is under commitment MI with Bhargav (Zyprexa, Seroquel, Latuda, Abilify) in [facility].  Her provisional discharge was revoked.  She has had ECT in the past but had cognitive impairment with a MoCA score of 9/30 and improvements in sympotms noted only on treatment days.  Her mother will not allow her to return to her home.  Plan to pursue [facility] funding and adult foster care placement.  She has outpatient psychiatry, therapy and case management.      Attestation:  Patient has been seen and evaluated by me, WILLIAN Sánchez CNP  The patient was counseled on nature of illness and treatment plan/options  Care was coordinated with treatment team       Clinical Global Impressions  First:  Considering your total clinical experience with this particular patient population, how severe are the patient's symptoms at this time?: 7 (02/11/21 1842)  Compared to the patient's condition at the START of treatment, this patient's condition is: 4 (02/11/21 1842)  Most recent:  Considering your total clinical experience with this particular patient population, how severe are the patient's symptoms at this time?: 7 (03/01/21 1050)  Compared to the patient's condition at the START of treatment, this patient's condition is: 4 (03/01/21 1050)          Interim History:     The patient's care was discussed with the treatment team and chart notes were reviewed.  Pt refused all medications yesterday and today and has been receiving IMs of Zyprexa twice daily per her [facility] order.  She was documented as sleeping well during the overnight shift.  She has been somewhat cooperative with her room lock out but has not been attending groups.  She said her mood is \"pretty low.\"  She continues to report suicidal ideation and would not discuss a plan.  She reports hearing Channing's voice telling her \"it's time to die\" and that she should not take her medications.  Her anxiety is \"not too " "bad.\"  She said, \"I don't deserve to be happy or live\" and therefore said she does not plan to take any medications orally.  She affirmed what chart review had previously indicated, that ECT was minimally effective and caused cognitive impairment with MoCA score of 9/30.         Medications:     Current Facility-Administered Medications   Medication     acetaminophen (TYLENOL) tablet 650 mg     alum & mag hydroxide-simethicone (MAALOX) suspension 30 mL     clonazePAM (klonoPIN) tablet 0.5 mg     hydrOXYzine (ATARAX) tablet 25 mg     lamoTRIgine (LaMICtal) tablet 50 mg     QUEtiapine ER (SEROquel XR) 24 hr tablet 300 mg    Or     OLANZapine (zyPREXA) injection 10 mg     QUEtiapine ER (SEROquel XR) 24 hr tablet 300 mg    Or     OLANZapine (zyPREXA) injection 10 mg     OLANZapine (zyPREXA) tablet 10 mg    Or     OLANZapine (zyPREXA) injection 10 mg     polyethylene glycol (MIRALAX) Packet 17 g     protriptyline (VIVACTIL) tablet 10 mg     QUEtiapine (SEROquel) tablet 25 mg     ramelteon (ROZEREM) tablet 8 mg     rOPINIRole (REQUIP) tablet 1 mg     traZODone (DESYREL) tablet 50 mg     venlafaxine (EFFEXOR-XR) 24 hr capsule 225 mg             Allergies:   No Known Allergies         Psychiatric Examination:     /78 (BP Location: Left arm)   Pulse 109   Temp 98  F (36.7  C)   Resp 16   Ht 1.651 m (5' 5\")   Wt 116.2 kg (256 lb 1.6 oz)   SpO2 98%   BMI 42.62 kg/m      Appearance: awake, alert and slightly unkempt  Attitude:  cooperative   Eye Contact:  fair  Mood:  \"pretty low,\" minimally anxious  Affect:  intensity is blunted  Speech:  low volume   Language: fluent and intact in English  Psychomotor, Gait, Musculoskeletal:  no evidence of tardive dyskinesia, dystonia, or tics  Thought Process:  goal oriented, linear, illogical  Associations:  no loose associations  Thought Content:  endorses suicidal ideation and auditory hallucinations of Channing's voice instructing her to commit suicide, contracts for safety on " unit, denies HI, denies visual hallucinations  Insight:  limited  Judgement:  limited  Oriented to:  month/year, time, person, and place   Attention Span and Concentration:  fair  Recent and Remote Memory:  intact  Fund of Knowledge:  appropriate         Labs:     No results found for this or any previous visit (from the past 24 hour(s)).

## 2021-03-04 NOTE — PROGRESS NOTES
"Pt has been isolating to her bedroom when not directed to be out of her room.  Came out for snacks and dinner then remained in the lounge until 9 pm.  She refused to take her scheduled Seroquel and appeared dismissive of staff.  When asked why she was refusing her medications she states \"It doesn't matter.  I feel dead inside.\"  Staff encouraged her to talk and to take her medications as she had reported it being helpful in the past.  I reminded her that she is to take the medications per [facility] order.  Pt responded \"I just feel trapped\" and continued to refuse oral medications.  Pt walked to her room when Code Green was called.  She commented that she would swallow the pills then just throw up the pills anyway.   Pt positioned herself side-lying on her bed and allowed RN to administer IM Zyprexa.    No struggle and no injury during episode.    "

## 2021-03-04 NOTE — PROGRESS NOTES
NOC Shift Report    Pt in bed at beginning of shift, breathing quiet and unlabored. Pt slept through shift.   No pt complaints or concerns at this time.   No PRNs given. Will continue to monitor.

## 2021-03-04 NOTE — PLAN OF CARE
"BEHAVIORAL TEAM DISCUSSION    Participants: Hina Morris, KARLOS; Sarah Evangelista and Alicia Coto, BAHMAN's; Charleen Booth RN  Progress: Pt had PD revoked, she was not cooperating with treatment.  Pt reports she is \"hearing the voice of Satan\"  Anticipated length of stay: 5-7 days  Continued Stay Criteria/Rationale: pt continues with poor judgment, impairment in functioning.  OP CM will obtain [facility] funding and plan is Adult Foster Care placement as mother has decided pt cannot return to [facility] at her house given pt's behaviors and mental health needs.    Medical/Physical: uneventful  Precautions:   Behavioral Orders   Procedures     Code 1 - Restrict to Unit     Routine Programming     As clinically indicated     Status 15     Every 15 minutes.     Suicide precautions     Patients on Suicide Precautions should have a Combination Diet ordered that includes a Diet selection(s) AND a Behavioral Tray selection for Safe Tray - with utensils, or Safe Tray - NO utensils       Plan: Pt remains under commitment.  She will be encouraged to cooperate with medications and plan to place in [facility].  She has OP therapy, psychiatry and case management.  Her  is following her progress  Rationale for change in precautions or plan: no change      "

## 2021-03-05 PROCEDURE — 87635 SARS-COV-2 COVID-19 AMP PRB: CPT | Performed by: NURSE PRACTITIONER

## 2021-03-05 PROCEDURE — 99232 SBSQ HOSP IP/OBS MODERATE 35: CPT | Performed by: NURSE PRACTITIONER

## 2021-03-05 PROCEDURE — 250N000011 HC RX IP 250 OP 636: Performed by: NURSE PRACTITIONER

## 2021-03-05 PROCEDURE — 124N000002 HC R&B MH UMMC

## 2021-03-05 RX ORDER — LAMOTRIGINE 25 MG/1
25 TABLET ORAL DAILY
Status: DISCONTINUED | OUTPATIENT
Start: 2021-03-06 | End: 2021-03-25

## 2021-03-05 RX ADMIN — OLANZAPINE 10 MG: 10 INJECTION, POWDER, FOR SOLUTION INTRAMUSCULAR at 19:36

## 2021-03-05 RX ADMIN — OLANZAPINE 10 MG: 10 INJECTION, POWDER, FOR SOLUTION INTRAMUSCULAR at 09:15

## 2021-03-05 ASSESSMENT — ACTIVITIES OF DAILY LIVING (ADL)
LAUNDRY: WITH SUPERVISION
ORAL_HYGIENE: INDEPENDENT
HYGIENE/GROOMING: INDEPENDENT
ORAL_HYGIENE: INDEPENDENT
DRESS: INDEPENDENT
HYGIENE/GROOMING: INDEPENDENT
DRESS: INDEPENDENT
LAUNDRY: WITH SUPERVISION

## 2021-03-05 NOTE — PLAN OF CARE
"Pt was isolative to room throughout shift except to come out for meals and to sit in the lounge for a short period of time. Pt refused all of her PO medications this morning and when this writer told her that she would be getting IM zyprexa instead pt said \"No, I don't take any medications.\" Pt received IM without difficulty. Affect is blunted, flat, tense. Mood is depressed. Pt endorses AH of Channing telling her to hurt herself. Will continue to monitor.  "

## 2021-03-05 NOTE — PROGRESS NOTES
[facility],  Psychiatric Progress Note      Impression:     Msity Parham is a 35-year-old female admitted to [facility] on 2/10/2021.  She was admitted under ongoing civil commitment MI with Bhargav in [facility] after taking 4-5 tabs of Unisom, 4-5 tabs of Ibuprofen and 4-5 tabs of Tylenol twice daily for 3 months in an attempt to commit suicide.  She was experiencing auditory hallucinations of Satan's voice commanding her to commit suicide.  Upon admission, provisional discharge was not revoked.  She had stopped taking medications several weeks prior to admission.  Since admission, Seroquel XR, Effexor ER, Rozerem and Protriptyline were restarted.  A Lamictal titration was restarted.  PRNs of Klonopin, Seroquel, Hydroxyzine Zyprexa and Trazodone were initiated.  She began refusing medications on 2/23 and has taken them inconsistently since.  She was spending nearly all of her time in her room and showed little motivation for treatment, so a room lock out was initiated as it has been during previous hospitalizations.  She continues to report auditory hallucinations of Satan instructing her to commit suicide, and to not take her medications.  She continues to report suicidal ideation.  Insight is poor.  A 72-hour old was placed 2/24 after she requested discharge, and her provisional discharge was revoked.         Diagnoses:     Borderline personality disorder  Major depressive disorder, recurrent, severe with psychotic features  Generalized anxiety disorder         Plan:     Medications:  Reduce Lamictal to 25 mg as she has been taking the 50 mg dose inconsistently.  Continue Rozerem 8 mg at HS.  Continue Seroquel  mg BID with a back up of IM Zyprexa available per Bhargav.  Continue Protriptyline 10 mg with dinner.  Continue PRNs of Klonopin, Seroquel, Hydroxyzine, Zyprexa and Trazodone.     Lock out of room from 9:30 AM - 12 PM, 1 PM - 3 PM  "and 5 PM to 9 PM.    She is under commitment MI with Bhargav (Zyprexa, Seroquel, Latuda, Abilify) in [facility].  Her provisional discharge was revoked.  She has had ECT in the past but had cognitive impairment with a MoCA score of 9/30 and improvements in sympotms noted only on treatment days.  Her mother will not allow her to return to her home.  Plan to pursue [facility] funding and adult foster care placement.  She has outpatient psychiatry, therapy and case management.      Attestation:  Patient has been seen and evaluated by me, WILLIAN Sánchez CNP  The patient was counseled on nature of illness and treatment plan/options  Care was coordinated with treatment team       Clinical Global Impressions  First:  Considering your total clinical experience with this particular patient population, how severe are the patient's symptoms at this time?: 7 (02/11/21 1842)  Compared to the patient's condition at the START of treatment, this patient's condition is: 4 (02/11/21 1842)  Most recent:  Considering your total clinical experience with this particular patient population, how severe are the patient's symptoms at this time?: 7 (03/01/21 1050)  Compared to the patient's condition at the START of treatment, this patient's condition is: 4 (03/01/21 1050)          Interim History:     The patient's care was discussed with the treatment team and chart notes were reviewed.  Pt was documented as sleeping throughout the overnight shift.  She has been partially compliant with her room lock out.  She refused all medications yesterday and received 2 injections of IM Zyprexa per Bhargav.  She said her mood is \"not the greatest\" and that depression and anxiety have both worsened.  She continues to reports auditory hallucinations of Satan's voice commanding her to kill herself and to avoid taking medications.  \"He likes to keep me trapped in my bad thoughts.\"  She says she is upset about her room lock out.  She said she has " "accepted that her mother will not allow her to reside with her.  States her appetite has been higher which she attributes to Zyprexa, but continues to refuse to take Seroquel XR instead.  Lamictal reduced back to 25 mg due to her refusal for 3 consecutive days.           Medications:     Current Facility-Administered Medications   Medication     acetaminophen (TYLENOL) tablet 650 mg     alum & mag hydroxide-simethicone (MAALOX) suspension 30 mL     clonazePAM (klonoPIN) tablet 0.5 mg     hydrOXYzine (ATARAX) tablet 25 mg     [START ON 3/6/2021] lamoTRIgine (LaMICtal) tablet 25 mg     QUEtiapine ER (SEROquel XR) 24 hr tablet 300 mg    Or     OLANZapine (zyPREXA) injection 10 mg     QUEtiapine ER (SEROquel XR) 24 hr tablet 300 mg    Or     OLANZapine (zyPREXA) injection 10 mg     OLANZapine (zyPREXA) tablet 10 mg    Or     OLANZapine (zyPREXA) injection 10 mg     polyethylene glycol (MIRALAX) Packet 17 g     protriptyline (VIVACTIL) tablet 10 mg     QUEtiapine (SEROquel) tablet 25 mg     ramelteon (ROZEREM) tablet 8 mg     rOPINIRole (REQUIP) tablet 1 mg     traZODone (DESYREL) tablet 50 mg     venlafaxine (EFFEXOR-XR) 24 hr capsule 225 mg             Allergies:   No Known Allergies         Psychiatric Examination:     /68 (BP Location: Left arm)   Pulse 96   Temp 97.1  F (36.2  C) (Oral)   Resp 17   Ht 1.651 m (5' 5\")   Wt 116.2 kg (256 lb 1.6 oz)   SpO2 99%   BMI 42.62 kg/m      Appearance: awake, alert and slightly unkempt  Attitude:  cooperative   Eye Contact:  fair  Mood:  \"not the greatest,\" depressed anxious  Affect:  intensity is blunted  Speech:  low volume   Language: fluent and intact in English  Psychomotor, Gait, Musculoskeletal:  no evidence of tardive dyskinesia, dystonia, or tics  Thought Process:  goal oriented, linear, illogical  Associations:  no loose associations  Thought Content:  endorses suicidal ideation and auditory hallucinations of Channing's voice instructing her to commit " suicide, contracts for safety on unit, denies HI, denies visual hallucinations  Insight:  limited  Judgement:  limited  Oriented to:  month/year, time, person, and place   Attention Span and Concentration:  fair  Recent and Remote Memory:  intact  Fund of Knowledge:  appropriate         Labs:     No results found for this or any previous visit (from the past 24 hour(s)).

## 2021-03-05 NOTE — PROGRESS NOTES
"Pt had been resting in bed until dinner then remained sitting in the lounge until 9 pm per behavior plan.  She continues to endorse voices of jackie telling her not take her medications and to die.  Pt states \"I feel dead inside\" and \"I don't want to get better\".  Staff offered 1:1 time with little progress then encouraged Pt to reconsider her stance on refusing medications.  At 9 pm she continued to refuse scheduled Seroquel as well as the IM Zyprexa.  Janice Maldonado was called for show of force at which time she did lie on her bed and allow IM Zyprexa administration to right deltoid.  Pt did not struggle with staff and remained sitting calmly on her bed after intervention.  She agreed to keep the light on and have staff check in with her afterward.    "

## 2021-03-06 PROCEDURE — 124N000002 HC R&B MH UMMC

## 2021-03-06 PROCEDURE — 250N000011 HC RX IP 250 OP 636: Performed by: NURSE PRACTITIONER

## 2021-03-06 RX ADMIN — OLANZAPINE 10 MG: 10 INJECTION, POWDER, FOR SOLUTION INTRAMUSCULAR at 21:35

## 2021-03-06 RX ADMIN — OLANZAPINE 10 MG: 10 INJECTION, POWDER, FOR SOLUTION INTRAMUSCULAR at 09:19

## 2021-03-06 NOTE — PLAN OF CARE
"Misty continues to be withdrawn and isolative.  When this writer offered her 6pm medications she stated \"I am not taking any medications, Satan will not allow it.\"  When a code green was called for another patient the oral medication was brought into patient's room, however, patient refused.  IM Zyprexa given in left deltoid without difficulty.  Patient has not attended to ADLs, appears disheveled, no eye contact, flat/blunted affect and depressed mood.   "

## 2021-03-06 NOTE — PLAN OF CARE
"Pt refused all PO medications today and stated \"I don't take any medications. I'm not taking any.\" IM zyprexa was given, pt tried to pull her sleeve over her arm to prevent this RN from administering the shot. Pt was isolative to room for first half of shift, withdrawn but did come into the lounge to watch tv with peers. Affect was blunted, flat, tense. Mood was depressed, anxious. Pt endorses AH of Channing speaking to her. No SI/SIB noted. Will continue to monitor.   "

## 2021-03-07 PROCEDURE — 124N000002 HC R&B MH UMMC

## 2021-03-07 PROCEDURE — 250N000011 HC RX IP 250 OP 636: Performed by: NURSE PRACTITIONER

## 2021-03-07 RX ADMIN — OLANZAPINE 10 MG: 10 INJECTION, POWDER, FOR SOLUTION INTRAMUSCULAR at 22:01

## 2021-03-07 RX ADMIN — OLANZAPINE 10 MG: 10 INJECTION, POWDER, FOR SOLUTION INTRAMUSCULAR at 09:25

## 2021-03-07 NOTE — PLAN OF CARE
"Pt was isolative, withdrawn. Affect was blunted, flat, tense. Mood was depressed. Pt endorses AH of Channing speaking to her. Pt refused her scheduled PO medications and stated \"No, I don't need it.\" Pt was again reminded that she is jarvised and received IM zyprexa. No SI/SIB noted. Will continue to monitor.  "

## 2021-03-07 NOTE — PLAN OF CARE
"Misty showered this evening and has been compliant  with being out of her room for the appropriate times.  Remains isolative and withdrawn.  Continues to refuse oral medications telling this RN \"Channing is very loudly telling me I will leave this unit in a body bag.\"  Writer reminded patient that PRN Klonopin helped her in the past to which she responded \"it did but when I stop taking it Channing is even louder and more pissed.\" IM Zyprexa given in left deltoid.  "

## 2021-03-08 PROCEDURE — 250N000013 HC RX MED GY IP 250 OP 250 PS 637: Performed by: NURSE PRACTITIONER

## 2021-03-08 PROCEDURE — 99232 SBSQ HOSP IP/OBS MODERATE 35: CPT | Performed by: NURSE PRACTITIONER

## 2021-03-08 PROCEDURE — 250N000011 HC RX IP 250 OP 636: Performed by: NURSE PRACTITIONER

## 2021-03-08 PROCEDURE — 250N000013 HC RX MED GY IP 250 OP 250 PS 637: Performed by: PSYCHIATRY & NEUROLOGY

## 2021-03-08 PROCEDURE — 124N000002 HC R&B MH UMMC

## 2021-03-08 RX ADMIN — ROPINIROLE HYDROCHLORIDE 1 MG: 1 TABLET, FILM COATED ORAL at 20:47

## 2021-03-08 RX ADMIN — OLANZAPINE 10 MG: 10 INJECTION, POWDER, FOR SOLUTION INTRAMUSCULAR at 08:08

## 2021-03-08 RX ADMIN — CLONAZEPAM 0.5 MG: 0.5 TABLET ORAL at 20:46

## 2021-03-08 RX ADMIN — RAMELTEON 8 MG: 8 TABLET ORAL at 20:46

## 2021-03-08 RX ADMIN — CLONAZEPAM 0.5 MG: 0.5 TABLET ORAL at 12:56

## 2021-03-08 RX ADMIN — QUETIAPINE FUMARATE 300 MG: 300 TABLET, EXTENDED RELEASE ORAL at 20:47

## 2021-03-08 NOTE — PLAN OF CARE
CTC note        Work Completed: reviewed chart and remotely attended team discussion.  Placed call to her SW    Samreen MS BrigetteW, [facility]  [facility]  [address] [place] [address] [phone] Cell  [phone] Right Fax    Samreen is applying to [employer] and will be filling out the [place] Choice Assessment forms on line.  She reports that pt stated her ex mother in law will be taking pt's sons to [facility].      Samreen also offers contact inform for [employer]: Shari [phone] in anticipation of their need for clinical.  Pt will need to sign GEORGI for options.    Discharge plan or goal: stabilize and discharge to structured setting that can manage her PD features.                Barriers to discharge: acuity of sxs  .

## 2021-03-08 NOTE — PLAN OF CARE
Evenings RN reported pt laying on floor in corner of room and linens had been removed.     At the beginning of shift, this writer and isra rn encourage pt to lay on mattress and pt willing to lay down in bed. Pt contracted for safety and blanket given. Pt up for water once and slept rest of shift. No safety issues reported.

## 2021-03-08 NOTE — PLAN OF CARE
Pt. willing to engage with staff for a short 1:1.  Pt. states that the auditory hallucination of the male voice is less after the zyprexa medication.  Pt. states that she has some fleeting thoughts of suicide but is currently not suicidal and feels safe on the unit.  Pt.'s suicidal ideation seems to be connected to the intensity of the auditory hallucination.  Pt. Is refusing to come out of her room, attend groups at this time.  Pt. has no questions for staff at this time.  Pt. appears fairly comfortable on the unit.

## 2021-03-08 NOTE — PLAN OF CARE
Patient remains anxious, tense, depressed, isolative and withdrawn.  Staff had a difficult time getting patient to come out of her room this shift.  Continues to refuse all medications and in fact, required a code green to be called for IM administration of Zyprexa this evening as began to hold her sweatshirt and resist.  Endorses AH of Channing.

## 2021-03-08 NOTE — PROGRESS NOTES
[facility],  Psychiatric Progress Note      Impression:     Misty Parham is a 35-year-old female admitted to [facility] on 2/10/2021.  She was admitted under ongoing civil commitment MI with Bhargav in [facility] after taking 4-5 tabs of Unisom, 4-5 tabs of Ibuprofen and 4-5 tabs of Tylenol twice daily for 3 months in an attempt to commit suicide.  She was experiencing auditory hallucinations of Satan's voice commanding her to commit suicide.  Upon admission, provisional discharge was not revoked.  She had stopped taking medications several weeks prior to admission.  Since admission, Seroquel XR, Effexor ER, Rozerem and Protriptyline were restarted.  A Lamictal titration was restarted.  PRNs of Klonopin, Seroquel, Hydroxyzine Zyprexa and Trazodone were initiated.  She began refusing medications on 2/23 and has taken them inconsistently since.  She was spending nearly all of her time in her room and showed little motivation for treatment, so a room lock out was initiated as it has been during previous hospitalizations.  She continues to report auditory hallucinations of Satan instructing her to commit suicide, and to not take her medications.  She continues to report suicidal ideation.  Insight is poor.  A 72-hour old was placed 2/24 after she requested discharge, and her provisional discharge was revoked.         Diagnoses:     Borderline personality disorder  Major depressive disorder, recurrent, severe with psychotic features  Generalized anxiety disorder         Plan:     Medications:  Continue Lamictal 25 mg daily.  Continue Rozerem 8 mg at HS.  Continue Seroquel  mg BID with a back up of IM Zyprexa available per Bharagv.  Continue Protriptyline 10 mg with dinner.  Continue PRNs of Klonopin, Seroquel, Hydroxyzine, Zyprexa and Trazodone.     Lock out of room from 9:30 AM - 12 PM, 1 PM - 3 PM and 5 PM to 9 PM.    She is under commitment MI  "with Durant (Zyprexa, Seroquel, Latuda, Abilify) in [facility].  Her provisional discharge was revoked.  She has had ECT in the past but had cognitive impairment with a MoCA score of 9/30 and improvements in sympotms noted only on treatment days.  Her mother will not allow her to return to her home.  Plan to pursue [employer] funding and adult foster care placement.  She has outpatient psychiatry, therapy and case management.      Attestation:  Patient has been seen and evaluated by me, WILLIAN Sánchez CNP  The patient was counseled on nature of illness and treatment plan/options  Care was coordinated with treatment team       Clinical Global Impressions  First:  Considering your total clinical experience with this particular patient population, how severe are the patient's symptoms at this time?: 7 (02/11/21 1842)  Compared to the patient's condition at the START of treatment, this patient's condition is: 4 (02/11/21 1842)  Most recent:  Considering your total clinical experience with this particular patient population, how severe are the patient's symptoms at this time?: 7 (03/01/21 1050)  Compared to the patient's condition at the START of treatment, this patient's condition is: 4 (03/01/21 1050)          Interim History:     The patient's care was discussed with the treatment team and chart notes were reviewed.  Pt refused all oral medications over the weekend and this morning, and has been receiving IM Zyprexa BID per [facility].  She said her mood is \"up and down.\"  She feels \"angry and frustrated\" about being hospitalized.  She was able to accurately and independently state that taking medications and attending groups will expedite her discharge.  She said she is considering both.  Suicidal thoughts are \"up and down.\"  She reports a plan to strangle herself and contracts for safety on the unit.  She reports auditory hallucinations of Channing's voice are unchanged in content, volume and frequency compared " "to last week.  States her sleep is \"on and off\" and that she naps sometimes.  She reports her appetite is high, which she relates to Zyprexa.         Medications:     Current Facility-Administered Medications   Medication     acetaminophen (TYLENOL) tablet 650 mg     alum & mag hydroxide-simethicone (MAALOX) suspension 30 mL     clonazePAM (klonoPIN) tablet 0.5 mg     hydrOXYzine (ATARAX) tablet 25 mg     lamoTRIgine (LaMICtal) tablet 25 mg     QUEtiapine ER (SEROquel XR) 24 hr tablet 300 mg    Or     OLANZapine (zyPREXA) injection 10 mg     QUEtiapine ER (SEROquel XR) 24 hr tablet 300 mg    Or     OLANZapine (zyPREXA) injection 10 mg     OLANZapine (zyPREXA) tablet 10 mg    Or     OLANZapine (zyPREXA) injection 10 mg     polyethylene glycol (MIRALAX) Packet 17 g     protriptyline (VIVACTIL) tablet 10 mg     QUEtiapine (SEROquel) tablet 25 mg     ramelteon (ROZEREM) tablet 8 mg     rOPINIRole (REQUIP) tablet 1 mg     traZODone (DESYREL) tablet 50 mg     venlafaxine (EFFEXOR-XR) 24 hr capsule 225 mg             Allergies:   No Known Allergies         Psychiatric Examination:     /79 (BP Location: Left arm)   Pulse 93   Temp 97.7  F (36.5  C) (Oral)   Resp 16   Ht 1.651 m (5' 5\")   Wt 116.2 kg (256 lb 1.6 oz)   SpO2 98%   BMI 42.62 kg/m      Appearance: awake, alert and slightly unkempt  Attitude:  cooperative   Eye Contact:  fair  Mood:  \"up and down,\" anxiety is \"pretty high\"  Affect:  intensity is blunted  Speech:  low volume   Language: fluent and intact in English  Psychomotor, Gait, Musculoskeletal:  no evidence of tardive dyskinesia, dystonia, or tics  Thought Process:  goal oriented, linear, illogical  Associations:  no loose associations  Thought Content:  endorses suicidal ideation and auditory hallucinations of Channing's voice instructing her to commit suicide, contracts for safety on unit, denies HI, denies visual hallucinations  Insight:  limited  Judgement:  limited  Oriented to:  month/year, " time, person, and place   Attention Span and Concentration:  fair  Recent and Remote Memory:  intact  Fund of Knowledge:  appropriate         Labs:     No results found for this or any previous visit (from the past 24 hour(s)).

## 2021-03-09 PROCEDURE — 99232 SBSQ HOSP IP/OBS MODERATE 35: CPT | Performed by: NURSE PRACTITIONER

## 2021-03-09 PROCEDURE — 250N000013 HC RX MED GY IP 250 OP 250 PS 637: Performed by: NURSE PRACTITIONER

## 2021-03-09 PROCEDURE — 124N000002 HC R&B MH UMMC

## 2021-03-09 PROCEDURE — 250N000013 HC RX MED GY IP 250 OP 250 PS 637: Performed by: PSYCHIATRY & NEUROLOGY

## 2021-03-09 RX ADMIN — PROTRIPTYLINE HYDROCHLORIDE 10 MG: 10 TABLET ORAL at 18:02

## 2021-03-09 RX ADMIN — CLONAZEPAM 0.5 MG: 0.5 TABLET ORAL at 14:09

## 2021-03-09 RX ADMIN — VENLAFAXINE HYDROCHLORIDE 225 MG: 150 CAPSULE, EXTENDED RELEASE ORAL at 08:43

## 2021-03-09 RX ADMIN — CLONAZEPAM 0.5 MG: 0.5 TABLET ORAL at 20:42

## 2021-03-09 RX ADMIN — QUETIAPINE FUMARATE 300 MG: 300 TABLET, EXTENDED RELEASE ORAL at 08:42

## 2021-03-09 RX ADMIN — RAMELTEON 8 MG: 8 TABLET ORAL at 20:38

## 2021-03-09 RX ADMIN — QUETIAPINE FUMARATE 300 MG: 300 TABLET, EXTENDED RELEASE ORAL at 20:38

## 2021-03-09 RX ADMIN — ROPINIROLE HYDROCHLORIDE 1 MG: 1 TABLET, FILM COATED ORAL at 20:38

## 2021-03-09 RX ADMIN — LAMOTRIGINE 25 MG: 25 TABLET ORAL at 08:43

## 2021-03-09 RX ADMIN — POLYETHYLENE GLYCOL 3350 17 G: 17 POWDER, FOR SOLUTION ORAL at 08:42

## 2021-03-09 ASSESSMENT — ACTIVITIES OF DAILY LIVING (ADL)
ORAL_HYGIENE: INDEPENDENT
HYGIENE/GROOMING: INDEPENDENT
ORAL_HYGIENE: INDEPENDENT
HYGIENE/GROOMING: INDEPENDENT
LAUNDRY: WITH SUPERVISION
DRESS: INDEPENDENT
DRESS: INDEPENDENT
LAUNDRY: WITH SUPERVISION

## 2021-03-09 NOTE — PLAN OF CARE
"Misty requested to talk with this writer.  Patient stated \"I feel I am at my rock bottom.\"  Patient appeared anxious, cheeks flushed, legs swinging side to side, sitting on her hands.  Patient explained that while growing up her mother and sister fought loudly in the home, she hid in the closet and stuffed her feelings.  Her step mother told her if she had girls she would have aborted them which ended their relationship and her relationship with her father.  Reported she was \"teased\" in school, therefore, more stuffed feelings.  Patient also reports \"I run from therapists because I don't know how to express myself and it is scary and painful.\"      Patient requested and received PRN Klonopin with scheduled evening medications stating \"I am tired of my chest hurting from anxiety\".   Denies SI/SIB \"I want to live and not be hopeless.\"  "

## 2021-03-09 NOTE — PLAN OF CARE
Pt appeared to sleep through the night. 7.5 sleep hours. No safety issues or concerns reported. Will continue to monitor.

## 2021-03-09 NOTE — PLAN OF CARE
"  Problem: Suicidal Behavior  Goal: Suicidal Behavior is Absent or Managed  Outcome: No Change  Flowsheets (Taken 3/9/2021 1114)  Mutually Determined Action Steps (Suicidal Behavior Absent/Managed):    shares suicidal thoughts    verbalizes safety check rationale    identifies protective factors     SI/Self harm:  pt continues to report chronic thoughts to strangle herself, states she doesn't want to die but hears strong AH from \"satan\" to kill herself in the evenings after \"the meds start to wear off\". Pt states she has no intent to harm self, though is also reluctant to contract for safety. Pt's room is currently locked per orders, and room is right across from desk for staff observation.  As pt's bed currently has linens, day shift will continue ordered room-lock and discuss with charge and evening RN removing bed linens if necessary this evening.  Aggression/agitation/HI:  none  AVH:  reports voice is \"quieter\" during the day, believes the seroquel helps. Pt reports that the \"voice of the devil gets louder as the med wears off\".   Sleep: NOC shift recorded 7.5 hours of sleep, though pt reports unable to sleep at night, \"tossing and turning\".   PRN Med: clonazepam for anxiety at 1412  Medication AE: pt denies  Physical Complaints/Issues: pt reports feeling \"dizzy, restless\"  I & O: eating and drinking well  ADLs: independent   Vitals:  stable and WNL  COVID 19 Assessment:  pt tested negative, does not show s/s of COVID19 infection  Milieu Participation: pt is visible as she is locked out of her room during the day to promote activity participation, which pt continues to decline. Pt keeps to self in the milieu.   Behavior: overall controlled, pt appears anxious though behavior is calm.     No other concerns at this time. Nursing will continue to monitor and assess.     "

## 2021-03-09 NOTE — PROGRESS NOTES
[facility],  Psychiatric Progress Note      Impression:     Misty Parham is a 35-year-old female admitted to [facility] on 2/10/2021.  She was admitted under ongoing civil commitment MI with Bhargav in [facility] after taking 4-5 tabs of Unisom, 4-5 tabs of Ibuprofen and 4-5 tabs of Tylenol twice daily for 3 months in an attempt to commit suicide.  She was experiencing auditory hallucinations of Satan's voice commanding her to commit suicide.  Upon admission, provisional discharge was not revoked.  She had stopped taking medications several weeks prior to admission.  Since admission, Seroquel XR, Effexor ER, Rozerem and Protriptyline were restarted.  A Lamictal titration was restarted.  PRNs of Klonopin, Seroquel, Hydroxyzine Zyprexa and Trazodone were initiated.  She began refusing medications on 2/23 and has taken them inconsistently since.  She was spending nearly all of her time in her room and showed little motivation for treatment, so a room lock out was initiated as it has been during previous hospitalizations.  She continues to report auditory hallucinations of Satan instructing her to commit suicide, and to not take her medications.  She continues to report suicidal ideation.  Insight is poor.  A 72-hour old was placed 2/24 after she requested discharge, and her provisional discharge was revoked.         Diagnoses:     Borderline personality disorder  Major depressive disorder, recurrent, severe with psychotic features  Generalized anxiety disorder         Plan:     Medications:  Continue Lamictal 25 mg daily.  Continue Rozerem 8 mg at HS.  Continue Seroquel  mg BID with a back up of IM Zyprexa available per Bhargav.  Continue Protriptyline 10 mg with dinner.  Continue PRNs of Klonopin, Seroquel, Hydroxyzine, Zyprexa and Trazodone.     Lock out of room from 9:30 AM - 12 PM, 1 PM - 3 PM and 5 PM to 9 PM.    She is under commitment MI  "with Bhargav (Zyprexa, Seroquel, Latuda, Abilify) in [facility].  Her provisional discharge was revoked.  She has had ECT in the past but had cognitive impairment with a MoCA score of 9/30 and improvements in sympotms noted only on treatment days.  Her mother will not allow her to return to her home.  Plan to pursue [employer] funding and adult foster care placement.  She has outpatient psychiatry, therapy and case management.      Attestation:  Patient has been seen and evaluated by me, WILLIAN Sánchez CNP  The patient was counseled on nature of illness and treatment plan/options  Care was coordinated with treatment team       Clinical Global Impressions  First:  Considering your total clinical experience with this particular patient population, how severe are the patient's symptoms at this time?: 7 (02/11/21 1842)  Compared to the patient's condition at the START of treatment, this patient's condition is: 4 (02/11/21 1842)  Most recent:  Considering your total clinical experience with this particular patient population, how severe are the patient's symptoms at this time?: 7 (03/09/21 0505)  Compared to the patient's condition at the START of treatment, this patient's condition is: 4 (03/09/21 0505)          Interim History:     The patient's care was discussed with the treatment team and chart notes were reviewed.  Pt was documented as sleeping 7.5 hours during the overnight shift.  Yesterday she refused AM and 5 PM meds but did take bedtime medications as well as PRN Klonopin x 2.  She also took AM meds today.  States she does plan to take medications as she recognizes they are beneficial and experiences increased appetite and weight gain with Zyprexa, which she had been receiving IM per Bhargav.  She said when she is taking meds and experiences a \"bad day,\" she wonders why she takes the medications.  She is now realizing that she will have some bad days even when she takes medications and that overall " "medications are beneficial.  States that her mood is \"so so\" and that she is feeling calmer.  She reports continuing suicidal ideation with thoughts of strangling herself and contracts for safety on the unit.  She reports auditory hallucinations are reduced in volume and continue to instruct her to commit suicide and to not take meds.  Provided pt with information regarding adult foster care and she said that this was reassuring and would be better than she had originally anticipated.  States her main priority would be to not have a shared room.  States she is not willing to go to groups, not because they are not beneficial, but because she is \"not ready.\"  She said her concentration is too impaired to read.            Medications:     Current Facility-Administered Medications   Medication     acetaminophen (TYLENOL) tablet 650 mg     alum & mag hydroxide-simethicone (MAALOX) suspension 30 mL     clonazePAM (klonoPIN) tablet 0.5 mg     hydrOXYzine (ATARAX) tablet 25 mg     lamoTRIgine (LaMICtal) tablet 25 mg     QUEtiapine ER (SEROquel XR) 24 hr tablet 300 mg    Or     OLANZapine (zyPREXA) injection 10 mg     QUEtiapine ER (SEROquel XR) 24 hr tablet 300 mg    Or     OLANZapine (zyPREXA) injection 10 mg     OLANZapine (zyPREXA) tablet 10 mg    Or     OLANZapine (zyPREXA) injection 10 mg     polyethylene glycol (MIRALAX) Packet 17 g     protriptyline (VIVACTIL) tablet 10 mg     QUEtiapine (SEROquel) tablet 25 mg     ramelteon (ROZEREM) tablet 8 mg     rOPINIRole (REQUIP) tablet 1 mg     traZODone (DESYREL) tablet 50 mg     venlafaxine (EFFEXOR-XR) 24 hr capsule 225 mg             Allergies:   No Known Allergies         Psychiatric Examination:     /77 (BP Location: Left arm)   Pulse 88   Temp 97.5  F (36.4  C)   Resp 16   Ht 1.651 m (5' 5\")   Wt 116.2 kg (256 lb 1.6 oz)   SpO2 98%   BMI 42.62 kg/m      Appearance: awake, alert and slightly unkempt  Attitude:  cooperative   Eye Contact:  fair  Mood:  \"so " "so\" calmer  Affect:  intensity is blunted  Speech:  low volume   Language: fluent and intact in English  Psychomotor, Gait, Musculoskeletal:  no evidence of tardive dyskinesia, dystonia, or tics  Thought Process:  goal oriented, linear, less than logical  Associations:  no loose associations  Thought Content:  endorses suicidal ideation and auditory hallucinations of Channing's voice instructing her to commit suicide, contracts for safety on unit, denies HI, denies visual hallucinations  Insight:  limited  Judgement:  limited  Oriented to:  month/year, time, person, and place   Attention Span and Concentration:  fair  Recent and Remote Memory:  intact  Fund of Knowledge:  appropriate         Labs:     No results found for this or any previous visit (from the past 24 hour(s)).  "

## 2021-03-10 PROCEDURE — 250N000013 HC RX MED GY IP 250 OP 250 PS 637: Performed by: PSYCHIATRY & NEUROLOGY

## 2021-03-10 PROCEDURE — 99232 SBSQ HOSP IP/OBS MODERATE 35: CPT | Performed by: NURSE PRACTITIONER

## 2021-03-10 PROCEDURE — 250N000013 HC RX MED GY IP 250 OP 250 PS 637: Performed by: NURSE PRACTITIONER

## 2021-03-10 PROCEDURE — 124N000002 HC R&B MH UMMC

## 2021-03-10 RX ADMIN — LAMOTRIGINE 25 MG: 25 TABLET ORAL at 08:58

## 2021-03-10 RX ADMIN — RAMELTEON 8 MG: 8 TABLET ORAL at 21:26

## 2021-03-10 RX ADMIN — QUETIAPINE FUMARATE 300 MG: 300 TABLET, EXTENDED RELEASE ORAL at 08:58

## 2021-03-10 RX ADMIN — CLONAZEPAM 0.5 MG: 0.5 TABLET ORAL at 11:23

## 2021-03-10 RX ADMIN — VENLAFAXINE HYDROCHLORIDE 225 MG: 150 CAPSULE, EXTENDED RELEASE ORAL at 08:58

## 2021-03-10 RX ADMIN — ROPINIROLE HYDROCHLORIDE 1 MG: 1 TABLET, FILM COATED ORAL at 21:27

## 2021-03-10 RX ADMIN — QUETIAPINE FUMARATE 300 MG: 300 TABLET, EXTENDED RELEASE ORAL at 21:26

## 2021-03-10 RX ADMIN — POLYETHYLENE GLYCOL 3350 17 G: 17 POWDER, FOR SOLUTION ORAL at 08:59

## 2021-03-10 RX ADMIN — PROTRIPTYLINE HYDROCHLORIDE 10 MG: 10 TABLET ORAL at 18:58

## 2021-03-10 ASSESSMENT — ACTIVITIES OF DAILY LIVING (ADL)
LAUNDRY: WITH SUPERVISION
DRESS: INDEPENDENT
HYGIENE/GROOMING: INDEPENDENT
DRESS: INDEPENDENT
ORAL_HYGIENE: INDEPENDENT
LAUNDRY: WITH SUPERVISION
ORAL_HYGIENE: INDEPENDENT
HYGIENE/GROOMING: INDEPENDENT

## 2021-03-10 NOTE — PLAN OF CARE
Pt. willing to engage in a 1:1 with staff.  This is an improvement due to pt. willing to participate in the 1:1 more than usual.  Pt. states that she is in the lounge because she is locked out of her room.  However, the pt. came out of her room voluntary which she has not done recently.  Pt. states that she is not ready for groups but was reading a book in the dining room.  Pt. accepted her medications without incident.  Pt. states that her depression is a 7-8 and her anxiety is a 9, on a scale of 1-10 with 10 being high.  Pt. states that she would like a prn medication for her anxiety, which was given.  Pt.'s voice volume  was louder during this interaction than usual.

## 2021-03-10 NOTE — PROGRESS NOTES
[facility],  Psychiatric Progress Note      Impression:     Misty Parham is a 35-year-old female admitted to [facility] on 2/10/2021.  She was admitted under ongoing civil commitment MI with Bhargav in [facility] after taking 4-5 tabs of Unisom, 4-5 tabs of Ibuprofen and 4-5 tabs of Tylenol twice daily for 3 months in an attempt to commit suicide.  She was experiencing auditory hallucinations of Satan's voice commanding her to commit suicide.  Upon admission, provisional discharge was not revoked.  She had stopped taking medications several weeks prior to admission.  Since admission, Seroquel XR, Effexor ER, Rozerem and Protriptyline were restarted.  A Lamictal titration was restarted.  PRNs of Klonopin, Seroquel, Hydroxyzine Zyprexa and Trazodone were initiated.  She began refusing medications on 2/23 and has taken them inconsistently since.  She was spending nearly all of her time in her room and showed little motivation for treatment, so a room lock out was initiated as it has been during previous hospitalizations.  She continues to report auditory hallucinations of Satan instructing her to commit suicide, and to not take her medications.  She continues to report suicidal ideation.  Insight is poor.  A 72-hour old was placed 2/24 after she requested discharge, and her provisional discharge was revoked.         Diagnoses:     Borderline personality disorder  Major depressive disorder, recurrent, severe with psychotic features  Generalized anxiety disorder         Plan:     Medications:  Continue Lamictal titration, currently 25 mg daily.  Continue Rozerem 8 mg at HS.  Continue Seroquel  mg BID with a back up of IM Zyprexa available per [facility].  Continue Protriptyline 10 mg with dinner.  Continue PRNs of Klonopin, Seroquel, Hydroxyzine, Zyprexa and Trazodone.     Lock out of room from 9:30 AM - 12 PM, 1 PM - 3 PM and 5 PM to 9 PM.    She is  under commitment MI with Bhargav (Zyprexa, Seroquel, Latuda, Abilify) in [facility].  Her provisional discharge was revoked.  She has had ECT in the past but had cognitive impairment with a MoCA score of 9/30 and improvements in sympotms noted only on treatment days.  Her mother will not allow her to return to her home.  Plan to pursue [employer] funding and adult foster care placement.  She has outpatient psychiatry, therapy and case management.      Attestation:  Patient has been seen and evaluated by me, WILLIAN Sánchez CNP  The patient was counseled on nature of illness and treatment plan/options  Care was coordinated with treatment team       Clinical Global Impressions  First:  Considering your total clinical experience with this particular patient population, how severe are the patient's symptoms at this time?: 7 (02/11/21 1842)  Compared to the patient's condition at the START of treatment, this patient's condition is: 4 (02/11/21 1842)  Most recent:  Considering your total clinical experience with this particular patient population, how severe are the patient's symptoms at this time?: 7 (03/09/21 0505)  Compared to the patient's condition at the START of treatment, this patient's condition is: 4 (03/09/21 0505)          Interim History:     The patient's care was discussed with the treatment team and chart notes were reviewed.  Pt was documented as sleeping 7 hours during the overnight shift.  She took all meds as prescribed yesterday.  She also took PRN Klonopin x 2.  She has been mostly adherent to her room lock out.  Continues to decline groups.  States she has been able to concentrate well enough to read for short periods of time.  She reports that auditory hallucinations occur 100% of the time when she is awake and are quite distracting.  The volume is lower when she takes Seroquel XR as prescribed.  States symptoms are generally worse in the evening.  She reports that suicidal thoughts with  "a plan to strangle herself are worse when her anxiety is high.  She contracts for safety on the unit.           Medications:     Current Facility-Administered Medications   Medication     acetaminophen (TYLENOL) tablet 650 mg     alum & mag hydroxide-simethicone (MAALOX) suspension 30 mL     clonazePAM (klonoPIN) tablet 0.5 mg     hydrOXYzine (ATARAX) tablet 25 mg     lamoTRIgine (LaMICtal) tablet 25 mg     QUEtiapine ER (SEROquel XR) 24 hr tablet 300 mg    Or     OLANZapine (zyPREXA) injection 10 mg     QUEtiapine ER (SEROquel XR) 24 hr tablet 300 mg    Or     OLANZapine (zyPREXA) injection 10 mg     OLANZapine (zyPREXA) tablet 10 mg    Or     OLANZapine (zyPREXA) injection 10 mg     polyethylene glycol (MIRALAX) Packet 17 g     protriptyline (VIVACTIL) tablet 10 mg     QUEtiapine (SEROquel) tablet 25 mg     ramelteon (ROZEREM) tablet 8 mg     rOPINIRole (REQUIP) tablet 1 mg     traZODone (DESYREL) tablet 50 mg     venlafaxine (EFFEXOR-XR) 24 hr capsule 225 mg             Allergies:   No Known Allergies         Psychiatric Examination:     /76   Pulse 116   Temp 97.4  F (36.3  C)   Resp 16   Ht 1.651 m (5' 5\")   Wt 116.2 kg (256 lb 1.6 oz)   SpO2 94%   BMI 42.62 kg/m      Appearance: awake, alert and slightly unkempt  Attitude:  cooperative   Eye Contact:  fair  Mood:  depressed, anxious  Affect:  intensity is blunted  Speech:  low volume   Language: fluent and intact in English  Psychomotor, Gait, Musculoskeletal:  no evidence of tardive dyskinesia, dystonia, or tics  Thought Process:  goal oriented, linear, less than logical  Associations:  no loose associations  Thought Content:  endorses suicidal ideation and auditory hallucinations of Channing's voice instructing her to commit suicide, contracts for safety on unit, denies HI, denies visual hallucinations  Insight:  limited  Judgement:  limited  Oriented to:  month/year, time, person, and place   Attention Span and Concentration:  fair  Recent and " Remote Memory:  intact  Fund of Knowledge:  appropriate         Labs:     No results found for this or any previous visit (from the past 24 hour(s)).

## 2021-03-10 NOTE — PLAN OF CARE
Pt appeared to sleep through the night.  7 hours of sleep. No safety issues or concerns reported. Will continue to monitor.

## 2021-03-10 NOTE — PROGRESS NOTES
"Pt has been compliant with out-of-room activity from 5 pm to 9 pm this evening.  Activities included watching tv, meals, snacks, shower and linen change.  She does not initiate conversations or participate in group activities but she will respond in a soft voice when addressed.  Pt's ex- called and she reports having a good talk.  \"He can be verbally abusive but it was okay.\"    She continues to report voices, passive SI and anxiety but says she can remain safe.  She says that Klonopin helps has been effective for her anxiety and the voices have been less intense this evening.  We discussed the next step is to attend groups.  Pt took her scheduled medications promptly this shift.    "

## 2021-03-11 PROCEDURE — 250N000013 HC RX MED GY IP 250 OP 250 PS 637: Performed by: NURSE PRACTITIONER

## 2021-03-11 PROCEDURE — 124N000002 HC R&B MH UMMC

## 2021-03-11 PROCEDURE — 99232 SBSQ HOSP IP/OBS MODERATE 35: CPT | Performed by: NURSE PRACTITIONER

## 2021-03-11 PROCEDURE — 250N000013 HC RX MED GY IP 250 OP 250 PS 637: Performed by: PSYCHIATRY & NEUROLOGY

## 2021-03-11 RX ADMIN — POLYETHYLENE GLYCOL 3350 17 G: 17 POWDER, FOR SOLUTION ORAL at 08:07

## 2021-03-11 RX ADMIN — PROTRIPTYLINE HYDROCHLORIDE 10 MG: 10 TABLET ORAL at 17:54

## 2021-03-11 RX ADMIN — ROPINIROLE HYDROCHLORIDE 1 MG: 1 TABLET, FILM COATED ORAL at 20:26

## 2021-03-11 RX ADMIN — CLONAZEPAM 0.5 MG: 0.5 TABLET ORAL at 17:56

## 2021-03-11 RX ADMIN — RAMELTEON 8 MG: 8 TABLET ORAL at 20:26

## 2021-03-11 RX ADMIN — VENLAFAXINE HYDROCHLORIDE 225 MG: 150 CAPSULE, EXTENDED RELEASE ORAL at 08:07

## 2021-03-11 RX ADMIN — LAMOTRIGINE 25 MG: 25 TABLET ORAL at 08:07

## 2021-03-11 RX ADMIN — QUETIAPINE FUMARATE 300 MG: 300 TABLET, EXTENDED RELEASE ORAL at 08:07

## 2021-03-11 RX ADMIN — QUETIAPINE FUMARATE 300 MG: 300 TABLET, EXTENDED RELEASE ORAL at 20:26

## 2021-03-11 ASSESSMENT — ACTIVITIES OF DAILY LIVING (ADL)
LAUNDRY: WITH SUPERVISION
ORAL_HYGIENE: INDEPENDENT
HYGIENE/GROOMING: INDEPENDENT
HYGIENE/GROOMING: INDEPENDENT
DRESS: INDEPENDENT
ORAL_HYGIENE: INDEPENDENT
LAUNDRY: WITH SUPERVISION
DRESS: INDEPENDENT

## 2021-03-11 NOTE — PLAN OF CARE
"Misty has divided her time by reading a book or watching TV in the lounge.  Reports the voices have decreased some, however, the \"urge to die\" is still there because of Satan.  Patient noted to maintain eye contact, communicate more, even smile some.  Reports sleeping and eating well.  Made a few phone calls this shift.  "

## 2021-03-11 NOTE — PLAN OF CARE
Pt was compliant with PO medications this morning and was visible in the milieu off and on throughout the shift without having to be told to come out of her room. Affect was blunted, flat. Mood was depressed, anxious. Pt is still not going to groups. Pt endorses AH. Will continue to monitor.

## 2021-03-11 NOTE — PLAN OF CARE
BEHAVIORAL TEAM DISCUSSION    Participants: Hina Morris, KARLOS; Sarah Evangelista and Alicia Coto, BAHMAN's; Nayeli SCHWARTZ  Progress: AFC is being pursued through [facility] and updated progress notes need to be sent.  Anticipated length of stay: dependent upon admission to next program  Continued Stay Criteria/Rationale: pt continues to require highly structured setting and this is being coordinated through OP .  Medical/Physical: uneventful  Precautions:   Behavioral Orders   Procedures     Code 1 - Restrict to Unit     Routine Programming     As clinically indicated     Status 15     Every 15 minutes.     Suicide precautions     Patients on Suicide Precautions should have a Combination Diet ordered that includes a Diet selection(s) AND a Behavioral Tray selection for Safe Tray - with utensils, or Safe Tray - NO utensils       Plan: assess, monitor and stabilize.  Working with OP CM for coordination of care including Possible admission to options.   Rationale for change in precautions or plan: no change.

## 2021-03-11 NOTE — PROGRESS NOTES
[facility],  Psychiatric Progress Note      Impression:     Misty Parham is a 35-year-old female admitted to [facility] on 2/10/2021.  She was admitted under ongoing civil commitment MI with Bhargav in [facility] after taking 4-5 tabs of Unisom, 4-5 tabs of Ibuprofen and 4-5 tabs of Tylenol twice daily for 3 months in an attempt to commit suicide.  She was experiencing auditory hallucinations of Satan's voice commanding her to commit suicide.  Upon admission, provisional discharge was not revoked.  She had stopped taking medications several weeks prior to admission.  Since admission, Seroquel XR, Effexor ER, Rozerem and Protriptyline were restarted.  A Lamictal titration was restarted.  PRNs of Klonopin, Seroquel, Hydroxyzine Zyprexa and Trazodone were initiated.  She began refusing medications on 2/23, took them inconsistently, and has been taking them regularly since 3/9.  She was spending nearly all of her time in her room and showed little motivation for treatment, so a room lock out was initiated as it has been during previous hospitalizations.  She continues to report auditory hallucinations of Satan instructing her to commit suicide, and to not take her medications.  She continues to report suicidal ideation.  Insight is poor.  A 72-hour old was placed 2/24 after she requested discharge, and her provisional discharge was revoked.         Diagnoses:     Borderline personality disorder  Major depressive disorder, recurrent, severe with psychotic features  Generalized anxiety disorder         Plan:     Medications:  Continue Lamictal titration, currently 25 mg daily.  Continue Rozerem 8 mg at HS.  Continue Seroquel  mg BID with a back up of IM Zyprexa available per [place].  Continue Protriptyline 10 mg with dinner.  Continue PRNs of Klonopin, Seroquel, Hydroxyzine, Zyprexa and Trazodone.     Lock out of room from 9:30 AM - 12 PM, 1 PM - 3  "PM and 5 PM to 9 PM.    She is under commitment MI with Bhargav (Zyprexa, Seroquel, Latuda, Abilify) in [facility].  Her provisional discharge was revoked.  She has had ECT in the past but had cognitive impairment with a MoCA score of 9/30 and improvements in sympotms noted only on treatment days.  Her mother will not allow her to return to her home.  Plan to pursue [facility] funding and adult foster care placement.  She has outpatient psychiatry, therapy and case management.      Attestation:  Patient has been seen and evaluated by me, WILLIAN Sánchez CNP  The patient was counseled on nature of illness and treatment plan/options  Care was coordinated with treatment team       Clinical Global Impressions  First:  Considering your total clinical experience with this particular patient population, how severe are the patient's symptoms at this time?: 7 (02/11/21 1842)  Compared to the patient's condition at the START of treatment, this patient's condition is: 4 (02/11/21 1842)  Most recent:  Considering your total clinical experience with this particular patient population, how severe are the patient's symptoms at this time?: 7 (03/09/21 0505)  Compared to the patient's condition at the START of treatment, this patient's condition is: 4 (03/09/21 0505)          Interim History:     The patient's care was discussed with the treatment team and chart notes were reviewed.  Pt was documented as sleeping 5.75 hours during the overnight shift.  She took all meds as prescribed yesterday.  She took PRN Klonopin x 1 yesterday.  She has been spending some time in the milieu, reading a book, watching TV and talking on the phone.  She has not attended any groups.  Pt reports her mood is depressed and anxious.  She continues to endorse suicidal ideation with a plan to strangle herself and contracts for safety on the unit.  Her energy is \"kind of low.\"  Appetite is slightly reduced since she has been taking oral " "medications rather than IM Zyprexa.  States that auditory hallucinations occur 100% of the time while awake but are now exclusively whispers.  When she wasn't taking medications she heard them as normal volume when she did what they asked and as screams when her actions defied their commands.           Medications:     Current Facility-Administered Medications   Medication     acetaminophen (TYLENOL) tablet 650 mg     alum & mag hydroxide-simethicone (MAALOX) suspension 30 mL     clonazePAM (klonoPIN) tablet 0.5 mg     hydrOXYzine (ATARAX) tablet 25 mg     lamoTRIgine (LaMICtal) tablet 25 mg     QUEtiapine ER (SEROquel XR) 24 hr tablet 300 mg    Or     OLANZapine (zyPREXA) injection 10 mg     QUEtiapine ER (SEROquel XR) 24 hr tablet 300 mg    Or     OLANZapine (zyPREXA) injection 10 mg     OLANZapine (zyPREXA) tablet 10 mg    Or     OLANZapine (zyPREXA) injection 10 mg     polyethylene glycol (MIRALAX) Packet 17 g     protriptyline (VIVACTIL) tablet 10 mg     QUEtiapine (SEROquel) tablet 25 mg     ramelteon (ROZEREM) tablet 8 mg     rOPINIRole (REQUIP) tablet 1 mg     traZODone (DESYREL) tablet 50 mg     venlafaxine (EFFEXOR-XR) 24 hr capsule 225 mg             Allergies:   No Known Allergies         Psychiatric Examination:     /86   Pulse 86   Temp 98  F (36.7  C) (Tympanic)   Resp 16   Ht 1.651 m (5' 5\")   Wt 116.2 kg (256 lb 1.6 oz)   SpO2 94%   BMI 42.62 kg/m      Appearance: awake, alert and slightly unkempt  Attitude:  cooperative   Eye Contact:  fair  Mood:  depressed, anxious  Affect:  intensity is blunted  Speech:  low volume   Language: fluent and intact in English  Psychomotor, Gait, Musculoskeletal:  no evidence of tardive dyskinesia, dystonia, or tics  Thought Process:  goal oriented, linear, less than logical  Associations:  no loose associations  Thought Content:  endorses suicidal ideation and auditory hallucinations of Channing's voice instructing her to commit suicide, contracts for " safety on unit, denies HI, denies visual hallucinations  Insight:  limited  Judgement:  limited  Oriented to:  month/year, time, person, and place   Attention Span and Concentration:  fair  Recent and Remote Memory:  intact  Fund of Knowledge:  appropriate         Labs:     No results found for this or any previous visit (from the past 24 hour(s)).

## 2021-03-12 PROCEDURE — 99232 SBSQ HOSP IP/OBS MODERATE 35: CPT | Performed by: NURSE PRACTITIONER

## 2021-03-12 PROCEDURE — 87635 SARS-COV-2 COVID-19 AMP PRB: CPT | Performed by: NURSE PRACTITIONER

## 2021-03-12 PROCEDURE — 250N000013 HC RX MED GY IP 250 OP 250 PS 637: Performed by: PSYCHIATRY & NEUROLOGY

## 2021-03-12 PROCEDURE — 250N000013 HC RX MED GY IP 250 OP 250 PS 637: Performed by: NURSE PRACTITIONER

## 2021-03-12 PROCEDURE — 124N000002 HC R&B MH UMMC

## 2021-03-12 RX ADMIN — POLYETHYLENE GLYCOL 3350 17 G: 17 POWDER, FOR SOLUTION ORAL at 08:26

## 2021-03-12 RX ADMIN — RAMELTEON 8 MG: 8 TABLET ORAL at 21:25

## 2021-03-12 RX ADMIN — QUETIAPINE FUMARATE 300 MG: 300 TABLET, EXTENDED RELEASE ORAL at 08:26

## 2021-03-12 RX ADMIN — VENLAFAXINE HYDROCHLORIDE 225 MG: 150 CAPSULE, EXTENDED RELEASE ORAL at 08:26

## 2021-03-12 RX ADMIN — ROPINIROLE HYDROCHLORIDE 1 MG: 1 TABLET, FILM COATED ORAL at 21:25

## 2021-03-12 RX ADMIN — PROTRIPTYLINE HYDROCHLORIDE 10 MG: 10 TABLET ORAL at 18:44

## 2021-03-12 RX ADMIN — LAMOTRIGINE 25 MG: 25 TABLET ORAL at 08:26

## 2021-03-12 RX ADMIN — QUETIAPINE FUMARATE 300 MG: 300 TABLET, EXTENDED RELEASE ORAL at 21:25

## 2021-03-12 ASSESSMENT — ACTIVITIES OF DAILY LIVING (ADL)
LAUNDRY: WITH SUPERVISION
HYGIENE/GROOMING: INDEPENDENT
ORAL_HYGIENE: INDEPENDENT
DRESS: INDEPENDENT
HYGIENE/GROOMING: INDEPENDENT
ORAL_HYGIENE: INDEPENDENT
LAUNDRY: WITH SUPERVISION
DRESS: INDEPENDENT

## 2021-03-12 NOTE — PROGRESS NOTES
[facility],  Psychiatric Progress Note      Impression:     Misty Parham is a 35-year-old female admitted to [facility] on 2/10/2021.  She was admitted under ongoing civil commitment MI with Bhargav in [facility] after taking 4-5 tabs of Unisom, 4-5 tabs of Ibuprofen and 4-5 tabs of Tylenol twice daily for 3 months in an attempt to commit suicide.  She was experiencing auditory hallucinations of Satan's voice commanding her to commit suicide.  Upon admission, provisional discharge was not revoked.  She had stopped taking medications several weeks prior to admission.  Since admission, Seroquel XR, Effexor ER, Rozerem and Protriptyline were restarted.  A Lamictal titration was restarted.  PRNs of Klonopin, Seroquel, Hydroxyzine Zyprexa and Trazodone were initiated.  She began refusing medications on 2/23, took them inconsistently, and has been taking them regularly since 3/9.  She was spending nearly all of her time in her room and showed little motivation for treatment, so a room lock out was initiated as it has been during previous hospitalizations.  She continues to report auditory hallucinations of Satan instructing her to commit suicide, and to not take her medications.  She continues to report suicidal ideation.  Insight is poor.  She superficially scratched her arm on 3/11.  A 72-hour old was placed 2/24 after she requested discharge, and her provisional discharge was revoked.         Diagnoses:     Borderline personality disorder  Major depressive disorder, recurrent, severe with psychotic features  Generalized anxiety disorder         Plan:     Medications:  Continue Lamictal titration, currently 25 mg daily.  Continue Rozerem 8 mg at HS.  Continue Seroquel  mg BID with a back up of IM Zyprexa available per [facility].  Continue Protriptyline 10 mg with dinner.  Continue PRNs of Klonopin, Seroquel, Hydroxyzine, Zyprexa and Trazodone.  "    Lock out of room from 9:30 AM - 12 PM, 1 PM - 3 PM and 5 PM to 9 PM.    She is under commitment MI with Bhargav (Zyprexa, Seroquel, Latuda, Abilify) in [facility].  Her provisional discharge was revoked.  She has had ECT in the past but had cognitive impairment with a MoCA score of 9/30 and improvements in sympotms noted only on treatment days.  Her mother will not allow her to return to her home.  Plan to pursue [facility] funding and adult foster care placement.  Interview scheduled 3/15.  She has outpatient psychiatry, therapy and case management.      Attestation:  Patient has been seen and evaluated by me, WILLIAN Sánchez CNP  The patient was counseled on nature of illness and treatment plan/options  Care was coordinated with treatment team       Clinical Global Impressions  First:  Considering your total clinical experience with this particular patient population, how severe are the patient's symptoms at this time?: 7 (02/11/21 1842)  Compared to the patient's condition at the START of treatment, this patient's condition is: 4 (02/11/21 1842)  Most recent:  Considering your total clinical experience with this particular patient population, how severe are the patient's symptoms at this time?: 7 (03/09/21 0505)  Compared to the patient's condition at the START of treatment, this patient's condition is: 4 (03/09/21 0505)          Interim History:     The patient's care was discussed with the treatment team and chart notes were reviewed.  Pt was documented as sleeping 6.5 hours during the overnight shift.  She took PRN Klonopin x 1 yesterday.  She has been partially compliant with her room lock out.  She has been spending time reading.  States that she sometimes has to reread a paragraphs but overall concentration is improving.  Pt says that she has urges to cut herself \"to feel pain\" and revealed some minor scratch marks she had self-inflicted on her left antecubital area yesterday.  She refused to " "reveal what she had utilized, but the pattern appeared similar to that of a fork.  Advised pt to discuss with staff if she feels in danger of harming herself, and staff can ensure objects with any potential for harm are removed from her room.  States that even when she is at baseline she experiences auditory hallucinations of Channing's voice as a whisper, 100% of the time while awake.  States that auditory hallucinations are at baseline and does not anticipate any additional improvement.  Her energy is \"kind of low.\"  Anxiety is reduced since she started taking her meds again.           Medications:     Current Facility-Administered Medications   Medication     acetaminophen (TYLENOL) tablet 650 mg     alum & mag hydroxide-simethicone (MAALOX) suspension 30 mL     clonazePAM (klonoPIN) tablet 0.5 mg     hydrOXYzine (ATARAX) tablet 25 mg     lamoTRIgine (LaMICtal) tablet 25 mg     QUEtiapine ER (SEROquel XR) 24 hr tablet 300 mg    Or     OLANZapine (zyPREXA) injection 10 mg     QUEtiapine ER (SEROquel XR) 24 hr tablet 300 mg    Or     OLANZapine (zyPREXA) injection 10 mg     OLANZapine (zyPREXA) tablet 10 mg    Or     OLANZapine (zyPREXA) injection 10 mg     polyethylene glycol (MIRALAX) Packet 17 g     protriptyline (VIVACTIL) tablet 10 mg     QUEtiapine (SEROquel) tablet 25 mg     ramelteon (ROZEREM) tablet 8 mg     rOPINIRole (REQUIP) tablet 1 mg     traZODone (DESYREL) tablet 50 mg     venlafaxine (EFFEXOR-XR) 24 hr capsule 225 mg             Allergies:   No Known Allergies         Psychiatric Examination:     /79 (BP Location: Left arm)   Pulse 102   Temp 97.3  F (36.3  C) (Tympanic)   Resp 16   Ht 1.651 m (5' 5\")   Wt 116.2 kg (256 lb 1.6 oz)   SpO2 98%   BMI 42.62 kg/m      Appearance: awake, alert and slightly unkempt  Attitude:  cooperative   Eye Contact:  fair  Mood:  depressed, less anxious  Affect:  intensity is blunted  Speech:  low volume   Language: fluent and intact in English  Psychomotor, " Gait, Musculoskeletal:  no evidence of tardive dyskinesia, dystonia, or tics  Thought Process:  goal oriented, linear, less than logical  Associations:  no loose associations  Thought Content:  endorses urges to cut self as a form of self-injury, endorses suicidal ideation and auditory hallucinations of Channing's voice instructing her to commit suicide, contracts for safety on unit, denies HI, denies visual hallucinations  Insight:  limited  Judgement:  limited  Oriented to:  month/year, time, person, and place   Attention Span and Concentration:  fair  Recent and Remote Memory:  intact  Fund of Knowledge:  appropriate         Labs:     No results found for this or any previous visit (from the past 24 hour(s)).

## 2021-03-12 NOTE — PLAN OF CARE
"  Problem: Behavioral Health Plan of Care  Goal: Plan of Care Review  Outcome: Improving  Flowsheets (Taken 3/11/2021 2749)  Plan of Care Reviewed With: patient  Progress: improving  Patient Agreement with Plan of Care: agrees     Pt endorses thoughts of killing herself in the context of auditory hallucinations. Pt reports the medications are helpful with minimizing the voices. Pt endorses feeling anxious and depressed. Pt reports clonazepam is helpful with reducing anxiety. Pt reports she feels more anxious when in the milieu, but is agreeable to staying in the lounge during designated room lock-out time.  Pt told RN she does not feel any different since taking medications. RN told pt that she has seen pt have an improvement in alertness and responsiveness. Pt was surprised when RN made that statement. Pt did smile. Pt continued to engage in conversation with RN, whereas before, pt was more guarded and not responsive.     When asked about discharge plans, pt prefers to go home to her mother, stepfather, and 14 year old so rather than a group home. Pt is unsure how long the length of stay is for the group home and RN encouraged pt to discuss this with CTC. Pt asked if she can get more information on her medications in the evening and RN gave pt printed micromedex \"patient handout.\"     Pt is visible in the milieu, reading a book. Pt does not interact with other patients.     Pt reports mood is anxious, affect is flat.     Plan  Continue with plan of care.    Precautions: Suicide  "

## 2021-03-12 NOTE — PLAN OF CARE
Pt was mostly isolative, withdrawn. Affect was blunted, flat. Mood was anxious, depressed. Pt engaged in SIB by superficial scratching on her arm. Pt refused to say what she used to scratch herself. This RN encouraged the pt to come to staff if she is having urges to self harm. Pt endorses AH of jackie. Pt was compliant with medications and ate meals. Will continue to monitor.

## 2021-03-13 PROCEDURE — 124N000002 HC R&B MH UMMC

## 2021-03-13 PROCEDURE — 250N000013 HC RX MED GY IP 250 OP 250 PS 637: Performed by: NURSE PRACTITIONER

## 2021-03-13 PROCEDURE — 250N000013 HC RX MED GY IP 250 OP 250 PS 637: Performed by: PSYCHIATRY & NEUROLOGY

## 2021-03-13 RX ADMIN — QUETIAPINE FUMARATE 300 MG: 300 TABLET, EXTENDED RELEASE ORAL at 20:16

## 2021-03-13 RX ADMIN — CLONAZEPAM 0.5 MG: 0.5 TABLET ORAL at 18:18

## 2021-03-13 RX ADMIN — ROPINIROLE HYDROCHLORIDE 1 MG: 1 TABLET, FILM COATED ORAL at 20:16

## 2021-03-13 RX ADMIN — QUETIAPINE FUMARATE 300 MG: 300 TABLET, EXTENDED RELEASE ORAL at 09:13

## 2021-03-13 RX ADMIN — PROTRIPTYLINE HYDROCHLORIDE 10 MG: 10 TABLET ORAL at 18:18

## 2021-03-13 RX ADMIN — RAMELTEON 8 MG: 8 TABLET ORAL at 20:16

## 2021-03-13 RX ADMIN — LAMOTRIGINE 25 MG: 25 TABLET ORAL at 09:13

## 2021-03-13 RX ADMIN — POLYETHYLENE GLYCOL 3350 17 G: 17 POWDER, FOR SOLUTION ORAL at 09:13

## 2021-03-13 RX ADMIN — VENLAFAXINE HYDROCHLORIDE 225 MG: 150 CAPSULE, EXTENDED RELEASE ORAL at 09:13

## 2021-03-13 NOTE — PROGRESS NOTES
Pt was resting in bed at the start of the evening shift.  She came out to the lounge for dinner and stated she ate all of her meal.  Pt went back to her room after dinner and had to be reminded that she must be out in the lounge until 9 pm.  We also talked about making it a goal to attend a group.  She stated that she doesn't feel comfortable being around people.  Pt appeared guarded and reluctant to comply with being out of her room.    When writer asked if she had thoughts of scratching herself she nodded yes.  She reported that she had items in her room that she may use to scratch herself with.  Writer then searched and found two plastic knives at her bedside table.  Pt replied that she did not scratch herself this evening.  She allowed writer to examine her forearms where she had a few scratches from yesterday and this morning.  Pt was escorted out of her room while room search was completed by another staff.  She remains sitting in the lounge until 9 pm.

## 2021-03-13 NOTE — PROGRESS NOTES
Pt is on a behavior plan to be locked out of her bedroom periodically throughout the day.  On call provider Dr De La Vega was updated on Pt's recent scratching behavior and that she had taken 2 plastic knives into her room this evening.  Pt did inform staff of her thoughts of SIB and has been quietly sitting in the lounge.  Diet order has now been changed to include No Utensils otherwise finger-foods only.  No SIO ordered at this time.  Continue to monitor frequently and encourage unit participation.

## 2021-03-13 NOTE — PLAN OF CARE
Pt. with encouragement willing to engage in a 1:1 with staff.  Pt. states that the auditory hallucination is about a 5 out of 10 with ten being high.  Pt.  states that the auditory hallucination is  telling the pt. to kill herself.   This is not new; this is chronic since admission.  Pt. states that she is safe on the unit, that she would not attempt suicide.  The pt. Verbally Indicates that she would not be safe if discharged from the hospital.  The pt. Does state that she has some self-injurious thoughts to cut herself.  The pt. Is on finger foods with no utensils.  The pt. understands the reason for finger foods.  Pt. was strongly encouraged to engage with staff more, to spend time in the lounge with staff and pts.; the pt. refused.  The pt. states that she wants to stay in her room. The pt. currently refuses to leave her room.

## 2021-03-14 PROCEDURE — 124N000002 HC R&B MH UMMC

## 2021-03-14 PROCEDURE — 250N000013 HC RX MED GY IP 250 OP 250 PS 637: Performed by: PSYCHIATRY & NEUROLOGY

## 2021-03-14 PROCEDURE — 250N000013 HC RX MED GY IP 250 OP 250 PS 637: Performed by: NURSE PRACTITIONER

## 2021-03-14 RX ADMIN — VENLAFAXINE HYDROCHLORIDE 225 MG: 150 CAPSULE, EXTENDED RELEASE ORAL at 08:35

## 2021-03-14 RX ADMIN — HYDROXYZINE HYDROCHLORIDE 25 MG: 25 TABLET, FILM COATED ORAL at 18:21

## 2021-03-14 RX ADMIN — QUETIAPINE FUMARATE 300 MG: 300 TABLET, EXTENDED RELEASE ORAL at 20:51

## 2021-03-14 RX ADMIN — ROPINIROLE HYDROCHLORIDE 1 MG: 1 TABLET, FILM COATED ORAL at 20:51

## 2021-03-14 RX ADMIN — QUETIAPINE FUMARATE 300 MG: 300 TABLET, EXTENDED RELEASE ORAL at 08:34

## 2021-03-14 RX ADMIN — RAMELTEON 8 MG: 8 TABLET ORAL at 20:51

## 2021-03-14 RX ADMIN — PROTRIPTYLINE HYDROCHLORIDE 10 MG: 10 TABLET ORAL at 18:21

## 2021-03-14 RX ADMIN — LAMOTRIGINE 25 MG: 25 TABLET ORAL at 08:35

## 2021-03-14 RX ADMIN — POLYETHYLENE GLYCOL 3350 17 G: 17 POWDER, FOR SOLUTION ORAL at 08:35

## 2021-03-14 RX ADMIN — CLONAZEPAM 0.5 MG: 0.5 TABLET ORAL at 18:21

## 2021-03-14 ASSESSMENT — ACTIVITIES OF DAILY LIVING (ADL)
ORAL_HYGIENE: INDEPENDENT
DRESS: INDEPENDENT
LAUNDRY: WITH SUPERVISION
LAUNDRY: WITH SUPERVISION
HYGIENE/GROOMING: INDEPENDENT
DRESS: INDEPENDENT
ORAL_HYGIENE: INDEPENDENT
HYGIENE/GROOMING: INDEPENDENT

## 2021-03-14 NOTE — PROGRESS NOTES
Pt. continues to maintain that she has chronic suicidal ideation but is safe on unit.  Pt. maintains that self-injurious thoughts are chronic and often present. Pt. continues to be closely observed.  Pt. Is not acting on her self-injurous thoughts.

## 2021-03-14 NOTE — PLAN OF CARE
Pt. willing to engage in a 1:1 with staff.  Pt. states that she still has the urge  to scratch her forearms.  Pt. Reports that since she was in the 8th grade she has had urges to scratch/cut herself.  Pt.  Is being monitored closely; pt.'s room is across from the nursing desk and is visible from the nursing desk.  Pt. states that the auditory hallucination is diminished greatly with the medication.  The auditory hallucination continues to tell the pt. to kill herself.  The pt. when talking about her children seem to become brighter overall.   The pt. Is in her room reading a book in no emotional distress currently.

## 2021-03-14 NOTE — PROGRESS NOTES
"Pt was quietly sitting in her room when staff approached to check-in.  She reports voices are \"the same\" and continues to ruminate on \"wanting to scratch\".  Staff asked if she was able to remain safe and Pt replied \"I don't know.\"  Staff encouraged her to talk with staff or ask for PRN medications when she feels anxious or having urges to hurt herself.  She responded \"I just like the feeling\" and then indicated that she has a plastic knife in her room and was unwilling to tell staff where she is hiding it.  Pt is locked out of her room at 5:45 pm while staff did a thorough room search.  Staff only found a small rubber pen which has been removed.  Pt understands she is now expected to remain in the lounge until 9 pm for her own safety.  She took PRN Klonopin with her Vivactil at supper time and has been calm/exhibiting safe behavior while in the lounge.    "

## 2021-03-14 NOTE — PROGRESS NOTES
Pt.'s SIO discontinued.  Pt. Had been placed on a SIO for scratching self.  Pt. Continues to have no utensils available to her.  Pt.'s room search  Completed, no sharrp objects in the pt.'s room.  Pt. Sitting in lounge with other pts. Reading in no distress.  Pt. Appears comfortable overall.  Pt.'s room is across from the nursing desk; pt. Is being monitored closely.

## 2021-03-14 NOTE — PLAN OF CARE
"Misty has been isolative and withdrawn today, not coming out of her room, except to accept a phone call from her son this evening.  During 1:1 patient confessed that she turned in 2 of 3 plastic knifes she had been hiding from staff, still having one hidden, however, unwilling to give it up.  It was explained that a room search would be completed for safety to which patient stated, \"I don't know why staff cares so much when I don't care.  I lost my soul a long time ago.\"    Patient has superficial LACs on bilateral arms at the AC areas, no broken skin.  On call provider gave order for SIO as patient unable to contract for safety.  Room search completed, pen found but no plastic knife.    "

## 2021-03-15 PROCEDURE — 250N000013 HC RX MED GY IP 250 OP 250 PS 637: Performed by: PSYCHIATRY & NEUROLOGY

## 2021-03-15 PROCEDURE — 124N000002 HC R&B MH UMMC

## 2021-03-15 PROCEDURE — 250N000013 HC RX MED GY IP 250 OP 250 PS 637: Performed by: NURSE PRACTITIONER

## 2021-03-15 PROCEDURE — 99232 SBSQ HOSP IP/OBS MODERATE 35: CPT | Performed by: NURSE PRACTITIONER

## 2021-03-15 RX ADMIN — PROTRIPTYLINE HYDROCHLORIDE 10 MG: 10 TABLET ORAL at 18:04

## 2021-03-15 RX ADMIN — QUETIAPINE FUMARATE 300 MG: 300 TABLET, EXTENDED RELEASE ORAL at 09:14

## 2021-03-15 RX ADMIN — ROPINIROLE HYDROCHLORIDE 1 MG: 1 TABLET, FILM COATED ORAL at 20:29

## 2021-03-15 RX ADMIN — RAMELTEON 8 MG: 8 TABLET ORAL at 20:29

## 2021-03-15 RX ADMIN — VENLAFAXINE HYDROCHLORIDE 225 MG: 150 CAPSULE, EXTENDED RELEASE ORAL at 09:14

## 2021-03-15 RX ADMIN — POLYETHYLENE GLYCOL 3350 17 G: 17 POWDER, FOR SOLUTION ORAL at 09:14

## 2021-03-15 RX ADMIN — LAMOTRIGINE 25 MG: 25 TABLET ORAL at 09:14

## 2021-03-15 RX ADMIN — QUETIAPINE FUMARATE 300 MG: 300 TABLET, EXTENDED RELEASE ORAL at 20:29

## 2021-03-15 ASSESSMENT — ACTIVITIES OF DAILY LIVING (ADL)
HYGIENE/GROOMING: INDEPENDENT
ORAL_HYGIENE: INDEPENDENT
LAUNDRY: WITH SUPERVISION
LAUNDRY: WITH SUPERVISION
DRESS: INDEPENDENT
HYGIENE/GROOMING: INDEPENDENT
DRESS: INDEPENDENT
ORAL_HYGIENE: INDEPENDENT

## 2021-03-15 NOTE — PLAN OF CARE
Pt. willing to engage in a 1:1 with staff.  Pt. denies suicidal ideation currently.  Pt. reports that her depression is an 8 out of 10 with 10 being high.  Pt. reports that her auditory hallucination is a 4 out of 10 in terms of intensity.  Pt. refuses   to leave her room, engage in groups, engage with other pts. or staff.  Pt. sits on her bed, staring out the room for long periods of time.

## 2021-03-15 NOTE — PROGRESS NOTES
[facility],  Psychiatric Progress Note      Impression:     Misty Parham is a 35-year-old female admitted to [facility] on 2/10/2021.  She was admitted under ongoing civil commitment MI with Bhargav in [facility] after taking 4-5 tabs of Unisom, 4-5 tabs of Ibuprofen and 4-5 tabs of Tylenol twice daily for 3 months in an attempt to commit suicide.  She was experiencing auditory hallucinations of Satan's voice commanding her to commit suicide.  Upon admission, provisional discharge was not revoked.  She had stopped taking medications several weeks prior to admission.  Since admission, Seroquel XR, Effexor ER, Rozerem and Protriptyline were restarted.  A Lamictal titration was restarted.  PRNs of Klonopin, Seroquel, Hydroxyzine Zyprexa and Trazodone were initiated.  She began refusing medications on 2/23, took them inconsistently, and has been taking them regularly since 3/9.  She was spending nearly all of her time in her room and showed little motivation for treatment, so a room lock out was initiated as it has been during previous hospitalizations.  She continues to report auditory hallucinations of Satan instructing her to commit suicide, and to not take her medications.  She continues to report suicidal ideation.  Insight is poor.  She superficially scratched her arm on 3/11.  A 72-hour hold was placed 2/24 after she requested discharge, and her provisional discharge was revoked.         Diagnoses:     Borderline personality disorder  Major depressive disorder, recurrent, severe with psychotic features  Generalized anxiety disorder         Plan:     Medications:  Continue Lamictal titration, currently 25 mg daily.  Continue Rozerem 8 mg at HS.  Continue Seroquel  mg BID with a back up of IM Zyprexa available per [facility].  Continue Protriptyline 10 mg with dinner.  Continue PRNs of Klonopin, Seroquel, Hydroxyzine, Zyprexa and Trazodone.      Lock out of room from 9:30 AM - 12 PM, 1 PM - 3 PM and 5 PM to 9 PM.    She is under commitment MI with Bhargav (Zyprexa, Seroquel, Latuda, Abilify) in [facility].  Her provisional discharge was revoked.  She has had ECT in the past but had cognitive impairment with a MoCA score of 9/30 and improvements in sympotms noted only on treatment days.  Her mother will not allow her to return to her home.  Plan to pursue [facility] funding and adult foster care placement.  Interview scheduled 3/16 at 1 PM.  She has outpatient psychiatry, therapy and case management.      Attestation:  Patient has been seen and evaluated by me, WILLIAN Sánchez CNP  The patient was counseled on nature of illness and treatment plan/options  Care was coordinated with treatment team       Clinical Global Impressions  First:  Considering your total clinical experience with this particular patient population, how severe are the patient's symptoms at this time?: 7 (02/11/21 1842)  Compared to the patient's condition at the START of treatment, this patient's condition is: 4 (02/11/21 1842)  Most recent:  Considering your total clinical experience with this particular patient population, how severe are the patient's symptoms at this time?: 7 (03/09/21 0505)  Compared to the patient's condition at the START of treatment, this patient's condition is: 4 (03/09/21 0505)          Interim History:     The patient's care was discussed with the treatment team and chart notes were reviewed.  Pt was documented as sleeping well during each of the weekend overnight shifts.  She has been taking scheduled meds as prescribed and occasionally using PRNs of Hydroxyzine and Klonopin.  Over the weekend she reported urges to harm herself by cutting.  She was on SIO 3/13 - 3/14.  Plastic utensils were found in her room and removed.  Diet order was changed to finger foods.  She has been partially adherent to her room lock out.  She refuses to attend groups.   "She reports that her children are moving to [place] for a year on April 7th and that she has mixed emotions about this.  She says she is \"kind of excited\" for them but will also miss them.  She reports that urges to engage in self-injury come and go in \"spurts.\"  She continues to report suicidal ideation with thoughts of strangling herself and contracts for safety in this regard.  States that auditory hallucinations have been lower in volume, still commanding her to refrain from taking medications and to commit suicide.  Her concentration is fair and she has been doing some reading.  Energy is \"kind of low.\"  Mood is \"down.\"  She reports she has been attending to ADLs.           Medications:     Current Facility-Administered Medications   Medication     acetaminophen (TYLENOL) tablet 650 mg     alum & mag hydroxide-simethicone (MAALOX) suspension 30 mL     clonazePAM (klonoPIN) tablet 0.5 mg     hydrOXYzine (ATARAX) tablet 25 mg     lamoTRIgine (LaMICtal) tablet 25 mg     QUEtiapine ER (SEROquel XR) 24 hr tablet 300 mg    Or     OLANZapine (zyPREXA) injection 10 mg     QUEtiapine ER (SEROquel XR) 24 hr tablet 300 mg    Or     OLANZapine (zyPREXA) injection 10 mg     OLANZapine (zyPREXA) tablet 10 mg    Or     OLANZapine (zyPREXA) injection 10 mg     polyethylene glycol (MIRALAX) Packet 17 g     protriptyline (VIVACTIL) tablet 10 mg     QUEtiapine (SEROquel) tablet 25 mg     ramelteon (ROZEREM) tablet 8 mg     rOPINIRole (REQUIP) tablet 1 mg     traZODone (DESYREL) tablet 50 mg     venlafaxine (EFFEXOR-XR) 24 hr capsule 225 mg             Allergies:   No Known Allergies         Psychiatric Examination:     BP 98/68   Pulse 118   Temp 98.2  F (36.8  C) (Tympanic)   Resp 16   Ht 1.651 m (5' 5\")   Wt 116.2 kg (256 lb 1.6 oz)   SpO2 94%   BMI 42.62 kg/m      Appearance: awake, alert and adequate grooming/hygiene  Attitude:  cooperative   Eye Contact:  fair  Mood:  \"down\"  Affect:  intensity is blunted  Speech:  low " volume   Language: fluent and intact in English  Psychomotor, Gait, Musculoskeletal:  no evidence of tardive dyskinesia, dystonia, or tics  Thought Process:  goal oriented, linear, less than logical  Associations:  no loose associations  Thought Content:  endorses urges to cut self as a form of self-injury, endorses suicidal ideation and auditory hallucinations of Channing's voice instructing her to commit suicide, contracts for safety on unit, denies HI, denies visual hallucinations  Insight:  limited  Judgement:  limited  Oriented to:  month/year, time, person, and place   Attention Span and Concentration:  fair  Recent and Remote Memory:  intact  Fund of Knowledge:  appropriate         Labs:     No results found for this or any previous visit (from the past 24 hour(s)).

## 2021-03-16 PROCEDURE — 124N000002 HC R&B MH UMMC

## 2021-03-16 PROCEDURE — 250N000013 HC RX MED GY IP 250 OP 250 PS 637: Performed by: NURSE PRACTITIONER

## 2021-03-16 PROCEDURE — 99232 SBSQ HOSP IP/OBS MODERATE 35: CPT | Performed by: NURSE PRACTITIONER

## 2021-03-16 PROCEDURE — 250N000013 HC RX MED GY IP 250 OP 250 PS 637: Performed by: PSYCHIATRY & NEUROLOGY

## 2021-03-16 PROCEDURE — 250N000011 HC RX IP 250 OP 636: Performed by: NURSE PRACTITIONER

## 2021-03-16 RX ADMIN — QUETIAPINE FUMARATE 300 MG: 300 TABLET, EXTENDED RELEASE ORAL at 20:36

## 2021-03-16 RX ADMIN — OLANZAPINE 10 MG: 10 INJECTION, POWDER, FOR SOLUTION INTRAMUSCULAR at 22:31

## 2021-03-16 RX ADMIN — LAMOTRIGINE 25 MG: 25 TABLET ORAL at 08:34

## 2021-03-16 RX ADMIN — RAMELTEON 8 MG: 8 TABLET ORAL at 20:36

## 2021-03-16 RX ADMIN — ROPINIROLE HYDROCHLORIDE 1 MG: 1 TABLET, FILM COATED ORAL at 20:36

## 2021-03-16 RX ADMIN — POLYETHYLENE GLYCOL 3350 17 G: 17 POWDER, FOR SOLUTION ORAL at 08:34

## 2021-03-16 RX ADMIN — QUETIAPINE FUMARATE 300 MG: 300 TABLET, EXTENDED RELEASE ORAL at 08:34

## 2021-03-16 RX ADMIN — VENLAFAXINE HYDROCHLORIDE 225 MG: 150 CAPSULE, EXTENDED RELEASE ORAL at 08:34

## 2021-03-16 NOTE — PROGRESS NOTES
[facility],  Psychiatric Progress Note      Impression:     Misty Parham is a 35-year-old female admitted to [facility] on 2/10/2021.  She was admitted under ongoing civil commitment MI with Bhargav in [facility] after taking 4-5 tabs of Unisom, 4-5 tabs of Ibuprofen and 4-5 tabs of Tylenol twice daily for 3 months in an attempt to commit suicide.  She was experiencing auditory hallucinations of Satan's voice commanding her to commit suicide.  Upon admission, provisional discharge was not revoked.  She had stopped taking medications several weeks prior to admission.  Since admission, Seroquel XR, Effexor ER, Rozerem and Protriptyline were restarted.  A Lamictal titration was restarted.  PRNs of Klonopin, Seroquel, Hydroxyzine Zyprexa and Trazodone were initiated.  She began refusing medications on 2/23, took them inconsistently, and has been taking them regularly since 3/9.  She was spending nearly all of her time in her room and showed little motivation for treatment, so a room lock out was initiated as it has been during previous hospitalizations.  She continues to report auditory hallucinations of Satan instructing her to commit suicide, and to not take her medications.  She continues to report suicidal ideation.  Insight is poor.  She superficially scratched her arm on 3/11.  A 72-hour hold was placed 2/24 after she requested discharge, and her provisional discharge was revoked.         Diagnoses:     Borderline personality disorder  Major depressive disorder, recurrent, severe with psychotic features  Generalized anxiety disorder         Plan:     Medications:  Continue Lamictal titration, currently 25 mg daily.  Continue Rozerem 8 mg at HS.  Continue Seroquel  mg BID with a back up of IM Zyprexa available per [facility].  Continue Protriptyline 10 mg with dinner.  Continue PRNs of Klonopin, Seroquel, Hydroxyzine, Zyprexa and Trazodone.  "    Lock out of room from 9:30 AM - 12 PM, 1 PM - 3 PM and 5 PM to 9 PM.    She is under commitment MI with Bhargav (Zyprexa, Seroquel, Latuda, Abilify) in [facility].  Her provisional discharge was revoked.  She has had ECT in the past but had cognitive impairment with a MoCA score of 9/30 and improvements in sympotms noted only on treatment days.  Her mother will not allow her to return to her home.  Plan to pursue [facility] funding and adult foster care placement.  Interview scheduled today at 1 PM.  She has outpatient psychiatry, therapy and case management.      Attestation:  Patient has been seen and evaluated by me, WILLIAN Sánchez CNP  The patient was counseled on nature of illness and treatment plan/options  Care was coordinated with treatment team       Clinical Global Impressions  First:  Considering your total clinical experience with this particular patient population, how severe are the patient's symptoms at this time?: 7 (02/11/21 1842)  Compared to the patient's condition at the START of treatment, this patient's condition is: 4 (02/11/21 1842)  Most recent:  Considering your total clinical experience with this particular patient population, how severe are the patient's symptoms at this time?: 7 (03/09/21 0505)  Compared to the patient's condition at the START of treatment, this patient's condition is: 4 (03/09/21 0505)          Interim History:     The patient's care was discussed with the treatment team and chart notes were reviewed.  Pt was documented as sleeping 7.25 hours during the overnight shift.  Staff found a plastic knife in her room during environmental checks.  She has been spending time reading.  She has not been attending groups.  Pt says her mood is \"alright.\"  Her anxiety is \"up and down.\"  Continues to report suicidal ideation with a plan to strangle herself.  She contracts for safety on the unit.  Her sleep has been \"off and on.\"  She said, \"Satan's voice is not as loud\" " "but remains constant.  She later said, \"Can you discontinue my prescriptions?  I don't need them anymore.  I need Channing's input.\"  She reports constipation as a side effect from meds and says Miralax has been helpful.           Medications:     Current Facility-Administered Medications   Medication     acetaminophen (TYLENOL) tablet 650 mg     alum & mag hydroxide-simethicone (MAALOX) suspension 30 mL     clonazePAM (klonoPIN) tablet 0.5 mg     hydrOXYzine (ATARAX) tablet 25 mg     lamoTRIgine (LaMICtal) tablet 25 mg     QUEtiapine ER (SEROquel XR) 24 hr tablet 300 mg    Or     OLANZapine (zyPREXA) injection 10 mg     QUEtiapine ER (SEROquel XR) 24 hr tablet 300 mg    Or     OLANZapine (zyPREXA) injection 10 mg     OLANZapine (zyPREXA) tablet 10 mg    Or     OLANZapine (zyPREXA) injection 10 mg     polyethylene glycol (MIRALAX) Packet 17 g     protriptyline (VIVACTIL) tablet 10 mg     QUEtiapine (SEROquel) tablet 25 mg     ramelteon (ROZEREM) tablet 8 mg     rOPINIRole (REQUIP) tablet 1 mg     traZODone (DESYREL) tablet 50 mg     venlafaxine (EFFEXOR-XR) 24 hr capsule 225 mg             Allergies:   No Known Allergies         Psychiatric Examination:     BP 95/71 (BP Location: Right arm)   Pulse 118   Temp 97.5  F (36.4  C)   Resp 16   Ht 1.651 m (5' 5\")   Wt 116.2 kg (256 lb 1.6 oz)   SpO2 99%   BMI 42.62 kg/m      Appearance: awake, alert and adequate grooming/hygiene  Attitude:  cooperative   Eye Contact:  fair  Mood:  \"alright,\" anxiety is \"up and down\"  Affect:  intensity is blunted  Speech:  low volume   Language: fluent and intact in English  Psychomotor, Gait, Musculoskeletal:  no evidence of tardive dyskinesia, dystonia, or tics  Thought Process:  goal oriented, linear, less than logical  Associations:  no loose associations  Thought Content:  endorses urges to cut self as a form of self-injury, endorses suicidal ideation and auditory hallucinations of Channing's voice instructing her to commit suicide, " contracts for safety on unit, denies HI, denies visual hallucinations  Insight:  limited  Judgement:  limited  Oriented to:  month/year, time, person, and place   Attention Span and Concentration:  fair  Recent and Remote Memory:  intact  Fund of Knowledge:  appropriate         Labs:     No results found for this or any previous visit (from the past 24 hour(s)).

## 2021-03-16 NOTE — PLAN OF CARE
"  CTC Note          Work Completed: reviewed chart, remotely attended team discussion.      Remotely attended discussion (outside team including Samreen Irvin CM, Kiana Irvin, this CTC and pt)  Meeting was held to coordinate MNChoices Assessment.   Pt was cooperative during the assessment and was offered many resources including Manjeet Irvin information packet, information on [facility] [employer], that she could work while on MA if she wants to consider this.   Pt answered questions regarding her capacity to do ADL's, etc.  She reported ongoing SI.  Pt could not answer what the unit was doing to keep her safe from the SI.  She reports she has \"Two boys 18 and 14 and a grown step daughter\"  She reports her youngest boy stays with mother but both boys are going to [place] with their father.  Pt refers to the father of her children as both her  and her ex .  She reports a goal of \"staying out of the hospital\".  Pt describes herself as \"very shy, isolative, you would have to talk to me first.  I'm respectful'.     Options residential was brought up.  Pt reports she has lived with her mother for the past 7-8 years.  Her \"ex \" as she calls him, continues to pay her phone bill.     Pt reports that she is in ok health, but has poor dental issues.  She reports she is on Disability.     Discharge plan or goal: working on a placement.  Was told this assessment will be final in a few weeks.                Barriers to discharge: acuity of mental health sxs    "

## 2021-03-16 NOTE — PLAN OF CARE
Pt appeared to sleep through the night. No safety issues or concerns reported. Will continue to monitor.  Slept 7.25hrs.

## 2021-03-16 NOTE — PLAN OF CARE
Pt was withdrawn, isolative, not social with peers. Affect was blunted, flat. Mood was depressed, anxious. Staff found a plastic knife hidden above pt's bathroom mirror during room checks. Staff removed the knife and notified this RN. Pt ate meals and was compliant with medications, did not attend any groups. Pt continues to have AH of jackie telling her to hurt herself and thoughts of SIB. Will continue to monitor.

## 2021-03-16 NOTE — PLAN OF CARE
"Patient cooperative with lock-out of room today stating \"it is the only reason I am out here.\" Patient ate 100% and accepted scheduled medications. Patient had 1300 CADI meeting today. Patient rates anxiety at 9/10 and depression at 9/10 (scale is 1-10 with 10 being the worst). Patient has a flat affect, declines groups and answers questions minimally when asked (no peer interaction observed). Patient states \"no GOALS.\"    Patient endorses \"chronic SI thoughts/SIB thoughts\" while brandt for safety on unit.    Nursing will continue to monitor.  "

## 2021-03-17 PROCEDURE — 250N000013 HC RX MED GY IP 250 OP 250 PS 637: Performed by: PSYCHIATRY & NEUROLOGY

## 2021-03-17 PROCEDURE — 99232 SBSQ HOSP IP/OBS MODERATE 35: CPT | Performed by: NURSE PRACTITIONER

## 2021-03-17 PROCEDURE — 124N000002 HC R&B MH UMMC

## 2021-03-17 PROCEDURE — 250N000013 HC RX MED GY IP 250 OP 250 PS 637: Performed by: NURSE PRACTITIONER

## 2021-03-17 RX ADMIN — LAMOTRIGINE 25 MG: 25 TABLET ORAL at 07:57

## 2021-03-17 RX ADMIN — QUETIAPINE FUMARATE 300 MG: 300 TABLET, EXTENDED RELEASE ORAL at 21:29

## 2021-03-17 RX ADMIN — RAMELTEON 8 MG: 8 TABLET ORAL at 21:29

## 2021-03-17 RX ADMIN — PROTRIPTYLINE HYDROCHLORIDE 10 MG: 10 TABLET ORAL at 17:53

## 2021-03-17 RX ADMIN — VENLAFAXINE HYDROCHLORIDE 225 MG: 150 CAPSULE, EXTENDED RELEASE ORAL at 07:57

## 2021-03-17 RX ADMIN — QUETIAPINE FUMARATE 300 MG: 300 TABLET, EXTENDED RELEASE ORAL at 07:57

## 2021-03-17 RX ADMIN — POLYETHYLENE GLYCOL 3350 17 G: 17 POWDER, FOR SOLUTION ORAL at 07:57

## 2021-03-17 RX ADMIN — ROPINIROLE HYDROCHLORIDE 1 MG: 1 TABLET, FILM COATED ORAL at 21:30

## 2021-03-17 ASSESSMENT — ACTIVITIES OF DAILY LIVING (ADL)
HYGIENE/GROOMING: INDEPENDENT
ORAL_HYGIENE: INDEPENDENT
DRESS: INDEPENDENT
LAUNDRY: WITH SUPERVISION
HYGIENE/GROOMING: INDEPENDENT
ORAL_HYGIENE: INDEPENDENT
LAUNDRY: WITH SUPERVISION
DRESS: INDEPENDENT

## 2021-03-17 NOTE — PLAN OF CARE
Problem: Suicidal Behavior  Goal: Suicidal Behavior is Absent or Managed  Outcome: No Change    Pt cont to be isolative except coming out for meals.She endorsed SI but won't disclose her plan.No HI but she rated anxiety and depression at 6 and 7 respectively.She c/o hearing voices telling her that it is time to die.No pain.No meds SE.IM Zyprexa given at HS with no resistance.Her affect was flat/blunted.

## 2021-03-17 NOTE — PLAN OF CARE
Pt appeared to sleep through the night. No safety issues or concerns reported. Will continue to monitor. Slept 7.5 hours.

## 2021-03-17 NOTE — PROGRESS NOTES
[facility],  Psychiatric Progress Note      Impression:     Misty Parham is a 35-year-old female admitted to [facility] on 2/10/2021.  She was admitted under ongoing civil commitment MI with Bhargav in [facility] after taking 4-5 tabs of Unisom, 4-5 tabs of Ibuprofen and 4-5 tabs of Tylenol twice daily for 3 months in an attempt to commit suicide.  She was experiencing auditory hallucinations of Satan's voice commanding her to commit suicide.  Upon admission, provisional discharge was not revoked.  She had stopped taking medications several weeks prior to admission.  Since admission, Seroquel XR, Effexor ER, Rozerem and Protriptyline were restarted.  A Lamictal titration was restarted.  PRNs of Klonopin, Seroquel, Hydroxyzine Zyprexa and Trazodone were initiated.  She began refusing medications on 2/23 and has been taking them inconsistently since.  She was spending nearly all of her time in her room and showed little motivation for treatment, so a room lock out was initiated as it has been during previous hospitalizations.  She continues to report auditory hallucinations of Satan instructing her to commit suicide, and to not take her medications.  She continues to report suicidal ideation.  Insight is poor.  She superficially scratched her arm on 3/11.  A 72-hour hold was placed 2/24 after she requested discharge, and her provisional discharge was revoked.         Diagnoses:     Borderline personality disorder  Major depressive disorder, recurrent, severe with psychotic features  Generalized anxiety disorder         Plan:     Medications:  Continue Lamictal titration, currently 25 mg daily.  Continue Rozerem 8 mg at HS.  Continue Seroquel  mg BID with a back up of IM Zyprexa available per [facility].  Continue Protriptyline 10 mg with dinner.  Continue PRNs of Klonopin, Seroquel, Hydroxyzine, Zyprexa and Trazodone.     Lock out of room from 9:30  "AM - 12 PM, 1 PM - 3 PM and 5 PM to 9 PM.    She is under commitment MI with Bhargav (Zyprexa, Seroquel, Latuda, Abilify) in [facility].  Her provisional discharge was revoked.  She has had ECT in the past but had cognitive impairment with a MoCA score of 9/30 and improvements in sympotms noted only on treatment days.  Her mother will not allow her to return to her home.  Plan to pursue CADI funding and adult foster care placement.  Interview was completed 3/16.   She has outpatient psychiatry, therapy and case management.      Attestation:  Patient has been seen and evaluated by me, Nissa Morris, WILLIAN CNP  The patient was counseled on nature of illness and treatment plan/options  Care was coordinated with treatment team     Clinical Global Impressions  First:  Considering your total clinical experience with this particular patient population, how severe are the patient's symptoms at this time?: 7 (02/11/21 1842)  Compared to the patient's condition at the START of treatment, this patient's condition is: 4 (02/11/21 1842)  Most recent:  Considering your total clinical experience with this particular patient population, how severe are the patient's symptoms at this time?: 7 (03/17/21 0528)  Compared to the patient's condition at the START of treatment, this patient's condition is: 4 (03/17/21 0528)          Interim History:     The patient's care was discussed with the treatment team and chart notes were reviewed.  Pt was documented as sleeping 7.5 hours during the overnight shift.  She refused evening medications yesterday and received IM Zyprexa.  She took her AM meds today.  She has been spending time in the milieu reading today.  She refuses to attend groups . She said yesterday's CADI meeting went \"alright\" and did not have any further questions.  Pt states she does not want to take any medications.  Reminded her that historically when she has had bad days she has decided not to take medications, " "then found that her symptoms worsened, proving that medications are beneficial.  She acknowledged this and stated auditory hallucinations were increased in volume last night when she refused meds.  However, she also stated, \"I can't be saved.\"  She continues to reports urges to engage in self-injury by cutting/scratching and suicidal ideation with a plan to strangle herself.  She contracts for safety on the unit.           Medications:     Current Facility-Administered Medications   Medication     acetaminophen (TYLENOL) tablet 650 mg     alum & mag hydroxide-simethicone (MAALOX) suspension 30 mL     clonazePAM (klonoPIN) tablet 0.5 mg     hydrOXYzine (ATARAX) tablet 25 mg     lamoTRIgine (LaMICtal) tablet 25 mg     QUEtiapine ER (SEROquel XR) 24 hr tablet 300 mg    Or     OLANZapine (zyPREXA) injection 10 mg     QUEtiapine ER (SEROquel XR) 24 hr tablet 300 mg    Or     OLANZapine (zyPREXA) injection 10 mg     OLANZapine (zyPREXA) tablet 10 mg    Or     OLANZapine (zyPREXA) injection 10 mg     polyethylene glycol (MIRALAX) Packet 17 g     protriptyline (VIVACTIL) tablet 10 mg     QUEtiapine (SEROquel) tablet 25 mg     ramelteon (ROZEREM) tablet 8 mg     rOPINIRole (REQUIP) tablet 1 mg     traZODone (DESYREL) tablet 50 mg     venlafaxine (EFFEXOR-XR) 24 hr capsule 225 mg             Allergies:   No Known Allergies         Psychiatric Examination:     /78 (BP Location: Left arm)   Pulse 96   Temp 97.4  F (36.3  C) (Oral)   Resp 16   Ht 1.651 m (5' 5\")   Wt 116.2 kg (256 lb 1.6 oz)   SpO2 97%   BMI 42.62 kg/m      Appearance: awake, alert and adequate grooming/hygiene  Attitude:  cooperative   Eye Contact:  fair  Mood:  \"alright\"  Affect:  intensity is blunted  Speech:  low volume   Language: fluent and intact in English  Psychomotor, Gait, Musculoskeletal:  no evidence of tardive dyskinesia, dystonia, or tics  Thought Process:  goal oriented, linear, less than logical  Associations:  no loose " associations  Thought Content:  endorses urges to cut self as a form of self-injury, endorses suicidal ideation and auditory hallucinations of Channing's voice instructing her to commit suicide, contracts for safety on unit, denies HI, denies visual hallucinations  Insight:  limited  Judgement:  limited  Oriented to:  month/year, time, person, and place   Attention Span and Concentration:  fair  Recent and Remote Memory:  intact  Fund of Knowledge:  appropriate         Labs:     No results found for this or any previous visit (from the past 24 hour(s)).

## 2021-03-17 NOTE — PLAN OF CARE
"Patient alert and oriented to person, place, time and situation. Mood anxious and depressed. Affect flat, blunted and guarded--congruent with stated mood. She has been mostly isolative to her room with only being out of room when directed to do so. She reported benefit from Seroquel in \"making the voices less loud\" and reported they were \"a whisper\" this am.  described as command and telling her to kill herself with a Orthodoxy component to them. She endorsed thoughts of wanting to be dead and thoughts about killing herself, but denied any specific plan or intent to kill herself at this time. She also endorsed thoughts of self-harm, specifically cutting, however stated she was not having active urges and was not acting on these thoughts. She was encouraged to stay out of room this am and sat in lounge reading. Concentration improving to the point she is better able to read without having to re-read as many pages due to distraction from . Appetite intact. Reported constipation from Seroquel was resolved with daily use of Miralax with last BM 3/15.   "

## 2021-03-18 PROCEDURE — 250N000011 HC RX IP 250 OP 636: Performed by: NURSE PRACTITIONER

## 2021-03-18 PROCEDURE — 124N000002 HC R&B MH UMMC

## 2021-03-18 PROCEDURE — 250N000013 HC RX MED GY IP 250 OP 250 PS 637: Performed by: PSYCHIATRY & NEUROLOGY

## 2021-03-18 PROCEDURE — 250N000013 HC RX MED GY IP 250 OP 250 PS 637: Performed by: NURSE PRACTITIONER

## 2021-03-18 PROCEDURE — 99232 SBSQ HOSP IP/OBS MODERATE 35: CPT | Performed by: NURSE PRACTITIONER

## 2021-03-18 RX ADMIN — POLYETHYLENE GLYCOL 3350 17 G: 17 POWDER, FOR SOLUTION ORAL at 09:45

## 2021-03-18 RX ADMIN — QUETIAPINE FUMARATE 300 MG: 300 TABLET, EXTENDED RELEASE ORAL at 09:47

## 2021-03-18 RX ADMIN — LAMOTRIGINE 25 MG: 25 TABLET ORAL at 09:47

## 2021-03-18 RX ADMIN — OLANZAPINE 10 MG: 10 INJECTION, POWDER, FOR SOLUTION INTRAMUSCULAR at 21:26

## 2021-03-18 RX ADMIN — VENLAFAXINE HYDROCHLORIDE 225 MG: 150 CAPSULE, EXTENDED RELEASE ORAL at 09:47

## 2021-03-18 ASSESSMENT — ACTIVITIES OF DAILY LIVING (ADL)
HYGIENE/GROOMING: INDEPENDENT
LAUNDRY: WITH SUPERVISION
HYGIENE/GROOMING: INDEPENDENT
ORAL_HYGIENE: INDEPENDENT
DRESS: INDEPENDENT
LAUNDRY: WITH SUPERVISION
ORAL_HYGIENE: INDEPENDENT
DRESS: INDEPENDENT

## 2021-03-18 NOTE — PROGRESS NOTES
Pt slept most of the night. Was awake once to get water. Pt on status 15. No other issues noted. Will continue to monitor.

## 2021-03-18 NOTE — PLAN OF CARE
CTC note        Work Completed: reviewed chart and remotely attended team discussion.   Returned call to Marbella [phone] at [facility].  LVM requesting her fax number.  Marbella is requesting AMANDA gutierrez choice assessment and commitment paperwork but did not leave fax number.      Discharge plan or goal: stabilize and discharge to [facility] if accepted.                  Barriers to discharge: acuity of mental health sxs

## 2021-03-18 NOTE — PROGRESS NOTES
"Pt reports the voices are \"the same\" as yesterday.  Continues to endorse thoughts of SI/SIB urges.  Denies cutting behavior today but says she isn't sure if she can be safe.  Pt complied with sitting in the lounge from 5:30 pm to 9 pm.  Her room was searched and no plastic knives/utensils were found.  She took her medications and remained in the lounge until 10 pm.    "

## 2021-03-18 NOTE — PLAN OF CARE
Pt. states that the auditory hallucination is very low, just whispers.  Pt. States that she is sad and depressed; pt. does deny suicidal ideation currently.  Pt. continues to report urges to scratch herself.  Pt. agreed to sit in the lounge and read her book to assist with diminishing her urges to scratch herself.

## 2021-03-18 NOTE — PROGRESS NOTES
[facility],  Psychiatric Progress Note      Impression:     Misty Parham is a 35-year-old female admitted to [facility] on 2/10/2021.  She was admitted under ongoing civil commitment MI with Bhargav in [facility] after taking 4-5 tabs of Unisom, 4-5 tabs of Ibuprofen and 4-5 tabs of Tylenol twice daily for 3 months in an attempt to commit suicide.  She was experiencing auditory hallucinations of Satan's voice commanding her to commit suicide.  Upon admission, provisional discharge was not revoked.  She had stopped taking medications several weeks prior to admission.  Since admission, Seroquel XR, Effexor ER, Rozerem and Protriptyline were restarted.  A Lamictal titration was restarted.  PRNs of Klonopin, Seroquel, Hydroxyzine Zyprexa and Trazodone were initiated.  She began refusing medications on 2/23 and has been taking them inconsistently since.  She was spending nearly all of her time in her room and showed little motivation for treatment, so a room lock out was initiated as it has been during previous hospitalizations.  She continues to report auditory hallucinations of Satan instructing her to commit suicide, and to not take her medications.  She continues to report suicidal ideation.  Insight is poor.  She has been intermittently superficially scratching her forearms, and her diet order was changed to finger foods; staff are checking her room to ensure she doesn't have plasticware or other objects with which to scratch herself.  A 72-hour hold was placed 2/24 after she requested discharge, and her provisional discharge was revoked.         Diagnoses:     Borderline personality disorder  Major depressive disorder, recurrent, severe with psychotic features  Generalized anxiety disorder         Plan:     Medications:  Continue Lamictal titration, currently 25 mg daily.  Continue Rozerem 8 mg at HS.  Continue Seroquel  mg BID with a back up  "of IM Zyprexa available per Bhargav.  Continue Protriptyline 10 mg with dinner.  Continue PRNs of Klonopin, Seroquel, Hydroxyzine, Zyprexa and Trazodone.     Lock out of room from 9:30 AM - 12 PM, 1 PM - 3 PM and 5 PM to 9 PM.    She is under commitment MI with Bhargav (Zyprexa, Seroquel, Latuda, Abilify) in [facility].  Her provisional discharge was revoked.  She has had ECT in the past but had cognitive impairment with a MoCA score of 9/30 and improvements in sympotms noted only on treatment days.  Her mother will not allow her to return to her home.  Plan to pursue [facility] funding and adult foster care placement.  Interview was completed 3/16.   She has outpatient psychiatry, therapy and case management.      Attestation:  Patient has been seen and evaluated by me, WILLIAN Sánchez CNP  The patient was counseled on nature of illness and treatment plan/options  Care was coordinated with treatment team     Clinical Global Impressions  First:  Considering your total clinical experience with this particular patient population, how severe are the patient's symptoms at this time?: 7 (02/11/21 1842)  Compared to the patient's condition at the START of treatment, this patient's condition is: 4 (02/11/21 1842)  Most recent:  Considering your total clinical experience with this particular patient population, how severe are the patient's symptoms at this time?: 7 (03/17/21 0528)  Compared to the patient's condition at the START of treatment, this patient's condition is: 4 (03/17/21 0528)          Interim History:     The patient's care was discussed with the treatment team and chart notes were reviewed.  Pt was documented as sleeping through the night.  She has been inconsistently adherent to room lock out, especially during the AM.  She has been spending some time reading.  She has been taking medications as prescribed.  She said, \"I don't see why I should take meds.\"  She said she could not recall previous " "conversations in which she acknowledged that she sometimes has bad days and thinks meds are not helpful, but then symptoms worsen when she does not take them.  Her mood remains depressed.  She continues to endorses constant, low-volume auditory hallucinations of Satan's voice.  She reports suicidal ideation with a plan to strangle herself; she contracts for safety in this regard however states that she has been scratching herself.  Superficial abrasions observed on bilateral forearms.  Sleep is \"off and on.\"          Medications:     Current Facility-Administered Medications   Medication     acetaminophen (TYLENOL) tablet 650 mg     alum & mag hydroxide-simethicone (MAALOX) suspension 30 mL     clonazePAM (klonoPIN) tablet 0.5 mg     hydrOXYzine (ATARAX) tablet 25 mg     lamoTRIgine (LaMICtal) tablet 25 mg     QUEtiapine ER (SEROquel XR) 24 hr tablet 300 mg    Or     OLANZapine (zyPREXA) injection 10 mg     QUEtiapine ER (SEROquel XR) 24 hr tablet 300 mg    Or     OLANZapine (zyPREXA) injection 10 mg     OLANZapine (zyPREXA) tablet 10 mg    Or     OLANZapine (zyPREXA) injection 10 mg     polyethylene glycol (MIRALAX) Packet 17 g     protriptyline (VIVACTIL) tablet 10 mg     QUEtiapine (SEROquel) tablet 25 mg     ramelteon (ROZEREM) tablet 8 mg     rOPINIRole (REQUIP) tablet 1 mg     traZODone (DESYREL) tablet 50 mg     venlafaxine (EFFEXOR-XR) 24 hr capsule 225 mg             Allergies:   No Known Allergies         Psychiatric Examination:     /75   Pulse 81   Temp 98  F (36.7  C) (Oral)   Resp 17   Ht 1.651 m (5' 5\")   Wt 116.2 kg (256 lb 1.6 oz)   SpO2 97%   BMI 42.62 kg/m      Appearance: awake, alert and adequate grooming/hygiene (showered today)  Attitude:  cooperative   Eye Contact:  fair  Mood:  depressed  Affect:  intensity is blunted  Speech:  low volume   Language: fluent and intact in English  Psychomotor, Gait, Musculoskeletal:  no evidence of tardive dyskinesia, dystonia, or tics  Thought " Process:  goal oriented, linear, less than logical  Associations:  no loose associations  Thought Content:  endorses suicidal ideation and auditory hallucinations of Channing's voice instructing her to commit suicide, contracts for safety on unit, denies HI, denies visual hallucinations, endorses urges to cut self as a form of self-injury but not with suicidal intent  Insight:  limited  Judgement:  limited  Oriented to:  month/year, time, person, and place   Attention Span and Concentration:  fair  Recent and Remote Memory:  intact  Fund of Knowledge:  appropriate         Labs:     No results found for this or any previous visit (from the past 24 hour(s)).

## 2021-03-19 PROCEDURE — 124N000002 HC R&B MH UMMC

## 2021-03-19 PROCEDURE — 250N000013 HC RX MED GY IP 250 OP 250 PS 637: Performed by: PSYCHIATRY & NEUROLOGY

## 2021-03-19 PROCEDURE — 99232 SBSQ HOSP IP/OBS MODERATE 35: CPT | Performed by: NURSE PRACTITIONER

## 2021-03-19 PROCEDURE — 250N000011 HC RX IP 250 OP 636: Performed by: NURSE PRACTITIONER

## 2021-03-19 RX ADMIN — OLANZAPINE 10 MG: 10 INJECTION, POWDER, FOR SOLUTION INTRAMUSCULAR at 08:28

## 2021-03-19 RX ADMIN — OLANZAPINE 10 MG: 10 INJECTION, POWDER, FOR SOLUTION INTRAMUSCULAR at 21:58

## 2021-03-19 NOTE — PLAN OF CARE
Pt was isolative and withdrawn to room and when in the lounge, not social with peers. Affect was blunted, flat, sad and tense. Mood was depressed. Pt refused her PO medications this morning and got the IM zyprexa instead. Pt was cooperative with the shot and requested that this RN give the shot in her left arm. Pt is still having AH and some thoughts of SI/SIB. Will continue to monitor.

## 2021-03-19 NOTE — PROGRESS NOTES
"Pt remains sitting in the dining area at this time.  Staff encouraged her to attend music therapy groups at this time but she appears content sitting at a table by herself outside the OT room.  She reports her mood is hopeless and frustrated because her kids are leaving for [place] and she will not be going home to see them before they leave.  She states that she is having an urge to cut.  Staff encouraged her to take PRN for anxiety and to use her coping skills.  Pt states the voices are \"less\" but commented \"I don't want to get better.\"  Staff has been monitoring her for safety as she is currently locked out of her room per her behavior plan.  Will continue to encourage PRN medications and diversion activities.    "

## 2021-03-19 NOTE — PLAN OF CARE
BEHAVIORAL TEAM DISCUSSION    Participants: Hina Morris, KARLOS; Sarah Evangelista and Alicia Coto, CTC's; Nayeli SCHWARTZ  Progress: personality disordered features are more prominent.  Pt refused medications today. Pt continues to report AH and SIB.  Pt does report that her sons are going to [place] with their father and this is effecting her mood adversely although she also seems to support this temporary change.  Pt has been unable to provide care to her sons and family members have had to step in.    Anticipated length of stay: 5-7 days.    Continued Stay Criteria/Rationale: in progress is a [facility]ho[facility] Assessment and referral to adult foster care setting as mother of pt no longer feels able to support pt in mother's home.  Medical/Physical: see chart  Precautions:   Behavioral Orders   Procedures     Code 1 - Restrict to Unit     Routine Programming     As clinically indicated     Status 15     Every 15 minutes.     Suicide precautions     Patients on Suicide Precautions should have a Combination Diet ordered that includes a Diet selection(s) AND a Behavioral Tray selection for Safe Tray - with utensils, or Safe Tray - NO utensils       Plan: continue to address behavioral issues and mental health sxs.  Pt is committed with Bhargav.  CTC's are working with OP CM and other agencies to coordinate discharge plan.  Psychiatry continues to meet with pt regularly  Rationale for change in precautions or plan: no change

## 2021-03-19 NOTE — PROGRESS NOTES
Pt in bed at beginning of shift, breathing quiet and unlabored. Pt slept through shift.   No pt complaints or concerns at this time.   No PRNs given. Will continue to monitor.

## 2021-03-19 NOTE — PLAN OF CARE
Work Completed:  Reviewed chart and remotely attended team discussion.   Call back received from Kim at [facility]  Offering her fax number for clinical due to admission referral.    Faxed clinical to [phone]    Discharge plan or goal: stabilize and discharge to [facility] with OP providers and case management.                Barriers to discharge: acuity of mental health sxs

## 2021-03-19 NOTE — PROGRESS NOTES
[facility],  Psychiatric Progress Note      Impression:     Misty Parham is a 35-year-old female admitted to [facility] on 2/10/2021.  She was admitted under ongoing civil commitment MI with Bhargav in [facility] after taking 4-5 tabs of Unisom, 4-5 tabs of Ibuprofen and 4-5 tabs of Tylenol twice daily for 3 months in an attempt to commit suicide.  She was experiencing auditory hallucinations of Satan's voice commanding her to commit suicide.  Upon admission, provisional discharge was not revoked.  She had stopped taking medications several weeks prior to admission.  Since admission, Seroquel XR, Effexor ER, Rozerem and Protriptyline were restarted.  A Lamictal titration was restarted.  PRNs of Klonopin, Seroquel, Hydroxyzine Zyprexa and Trazodone were initiated.  She began refusing medications on 2/23 and has been taking them inconsistently since.  She was spending nearly all of her time in her room and showed little motivation for treatment, so a room lock out was initiated as it has been during previous hospitalizations.  She continues to report auditory hallucinations of Satan instructing her to commit suicide, and to not take her medications.  She continues to report suicidal ideation.  Insight is poor.  She has been intermittently superficially scratching her forearms, and her diet order was changed to finger foods; staff are checking her room to ensure she doesn't have plasticware or other objects with which to scratch herself.  A 72-hour hold was placed 2/24 after she requested discharge, and her provisional discharge was revoked.         Diagnoses:     Borderline personality disorder  Major depressive disorder, recurrent, severe with psychotic features  Generalized anxiety disorder         Plan:     Medications:  Continue Lamictal titration, currently 25 mg daily.  Continue Rozerem 8 mg at HS.  Continue Seroquel  mg BID with a back up  "of IM Zyprexa available per Bhargav.  Continue Protriptyline 10 mg with dinner.  Continue PRNs of Klonopin, Seroquel, Hydroxyzine, Zyprexa and Trazodone.     Lock out of room from 9:30 AM - 12 PM, 1 PM - 3 PM and 5 PM to 9 PM.    She is under commitment MI with Bhargav (Zyprexa, Seroquel, Latuda, Abilify) in [facility].  Her provisional discharge was revoked.  She has had ECT in the past but had cognitive impairment with a MoCA score of 9/30 and improvements in sympotms noted only on treatment days.  Her mother will not allow her to return to her home.  Plan to pursue [facility] funding and adult foster care placement, possibly at [facility] residential.  [facility] interview was completed 3/16.   She has outpatient psychiatry, therapy and case management.      Attestation:  Patient has been seen and evaluated by me, WILLIAN Sánchez CNP  The patient was counseled on nature of illness and treatment plan/options  Care was coordinated with treatment team     Clinical Global Impressions  First:  Considering your total clinical experience with this particular patient population, how severe are the patient's symptoms at this time?: 7 (02/11/21 1842)  Compared to the patient's condition at the START of treatment, this patient's condition is: 4 (02/11/21 1842)  Most recent:  Considering your total clinical experience with this particular patient population, how severe are the patient's symptoms at this time?: 7 (03/17/21 0528)  Compared to the patient's condition at the START of treatment, this patient's condition is: 4 (03/17/21 0528)          Interim History:     The patient's care was discussed with the treatment team and chart notes were reviewed.  Pt was documented as sleeping through the night.  Staff found a plastic knife in her room yesterday.  Yesterday she refused 5 PM and HS meds and received IM Zyprexa.  She also refused AM meds today and received IM Zyprexa.  States she is refusing meds because \"when I go to " "the group home I'm not going to take them.\"  Pt acknowledged she is aware of her [facility] order and that nonadherence would result in readmission, but continued to state she would not take them after discharge.  Discussed benefits of being in a group home compared to the hospital.  She responded, \"I don't want to be anywhere.  I want to be dead.\"  Continues to report suicidal ideation and contracts for safety on the unit.  She reported auditory hallucinations are \"medium\" and worse since she has once again been refusing Seroquel XR.   She said, \"You can't help someone who doesn't want to be helped.\"  Pt said that her children are leaving for [place] on April 16th.  She has tried calling them but has been unable to reach them, though she has not tried leaving a message.           Medications:     Current Facility-Administered Medications   Medication     - MEDICATION INSTRUCTIONS -     acetaminophen (TYLENOL) tablet 650 mg     alum & mag hydroxide-simethicone (MAALOX) suspension 30 mL     clonazePAM (klonoPIN) tablet 0.5 mg     hydrOXYzine (ATARAX) tablet 25 mg     lamoTRIgine (LaMICtal) tablet 25 mg     QUEtiapine ER (SEROquel XR) 24 hr tablet 300 mg    Or     OLANZapine (zyPREXA) injection 10 mg     QUEtiapine ER (SEROquel XR) 24 hr tablet 300 mg    Or     OLANZapine (zyPREXA) injection 10 mg     OLANZapine (zyPREXA) tablet 10 mg    Or     OLANZapine (zyPREXA) injection 10 mg     polyethylene glycol (MIRALAX) Packet 17 g     protriptyline (VIVACTIL) tablet 10 mg     QUEtiapine (SEROquel) tablet 25 mg     ramelteon (ROZEREM) tablet 8 mg     rOPINIRole (REQUIP) tablet 1 mg     traZODone (DESYREL) tablet 50 mg     venlafaxine (EFFEXOR-XR) 24 hr capsule 225 mg             Allergies:   No Known Allergies         Psychiatric Examination:     BP 93/67 (BP Location: Left arm)   Pulse 91   Temp 98.3  F (36.8  C) (Oral)   Resp 18   Ht 1.651 m (5' 5\")   Wt 116.2 kg (256 lb 1.6 oz)   SpO2 98%   BMI 42.62 kg/m  "     Appearance: awake, alert and adequate grooming/hygiene   Attitude:  cooperative   Eye Contact:  fair  Mood:  depressed  Affect:  intensity is blunted  Speech:  low volume   Language: fluent and intact in English  Psychomotor, Gait, Musculoskeletal:  no evidence of tardive dyskinesia, dystonia, or tics  Thought Process:  goal oriented, linear, less than logical  Associations:  no loose associations  Thought Content:  endorses suicidal ideation and auditory hallucinations of Channing's voice instructing her to commit suicide, contracts for safety on unit, denies HI, denies visual hallucinations, endorses urges to cut self as a form of self-injury but not with suicidal intent  Insight:  limited  Judgement:  limited  Oriented to:  month/year, time, person, and place   Attention Span and Concentration:  fair  Recent and Remote Memory:  intact  Fund of Knowledge:  appropriate         Labs:     No results found for this or any previous visit (from the past 24 hour(s)).

## 2021-03-19 NOTE — PROGRESS NOTES
Writer noted Pt had been spending time in her bathroom since returning to her room.  When approached by staff, Pt informed that she had a plastic knife in her bathroom.  Writer did a room search and found a plastic knife atop the doorway of her bathroom.  Pt states that she had not yet attempted to cut herself but was unwilling to take her scheduled Seroquel.  Writer and unit staff approached to administer IM Zyprexa to her left deltoid at 9 pm.  Pt did not resist as staff held her hand.  Pt remains quietly sitting on her bed at this time.

## 2021-03-20 PROCEDURE — 250N000013 HC RX MED GY IP 250 OP 250 PS 637: Performed by: NURSE PRACTITIONER

## 2021-03-20 PROCEDURE — 124N000002 HC R&B MH UMMC

## 2021-03-20 PROCEDURE — 250N000013 HC RX MED GY IP 250 OP 250 PS 637: Performed by: PSYCHIATRY & NEUROLOGY

## 2021-03-20 RX ADMIN — VENLAFAXINE HYDROCHLORIDE 225 MG: 150 CAPSULE, EXTENDED RELEASE ORAL at 08:07

## 2021-03-20 RX ADMIN — POLYETHYLENE GLYCOL 3350 17 G: 17 POWDER, FOR SOLUTION ORAL at 08:07

## 2021-03-20 RX ADMIN — QUETIAPINE FUMARATE 300 MG: 300 TABLET, EXTENDED RELEASE ORAL at 20:56

## 2021-03-20 RX ADMIN — RAMELTEON 8 MG: 8 TABLET ORAL at 20:56

## 2021-03-20 RX ADMIN — QUETIAPINE FUMARATE 300 MG: 300 TABLET, EXTENDED RELEASE ORAL at 08:07

## 2021-03-20 RX ADMIN — LAMOTRIGINE 25 MG: 25 TABLET ORAL at 08:07

## 2021-03-20 RX ADMIN — PROTRIPTYLINE HYDROCHLORIDE 10 MG: 10 TABLET ORAL at 19:14

## 2021-03-20 RX ADMIN — ROPINIROLE HYDROCHLORIDE 1 MG: 1 TABLET, FILM COATED ORAL at 20:56

## 2021-03-20 ASSESSMENT — ACTIVITIES OF DAILY LIVING (ADL)
ORAL_HYGIENE: INDEPENDENT
HYGIENE/GROOMING: INDEPENDENT
DRESS: INDEPENDENT
HYGIENE/GROOMING: INDEPENDENT
DRESS: INDEPENDENT;SCRUBS (BEHAVIORAL HEALTH)
LAUNDRY: WITH SUPERVISION
LAUNDRY: WITH SUPERVISION
ORAL_HYGIENE: INDEPENDENT

## 2021-03-20 NOTE — PLAN OF CARE
Pt was compliant with all PO medications this morning. She spent time watching tv in the lounge with peers, minimally social, and read a book in her room. Affect was blunted, flat. Mood was depressed. Pt endorses AH and has thoughts of SI/SIB. Will continue to monitor.

## 2021-03-20 NOTE — PLAN OF CARE
Pt appeared to sleep through the night. No safety issues or concerns reported. Will continue to monitor. Pt slept 7.5 hours.

## 2021-03-20 NOTE — PLAN OF CARE
Problem: Behavior Regulation Impairment (Psychotic Signs/Symptoms)  Goal: Improved Behavioral Control (Psychotic Signs/Symptoms)  Outcome: No Change  Flowsheets (Taken 3/19/2021 2212)  Mutually Determined Action Steps (Improved Behavioral Control):   identifies symptoms triggers   verbalizes personal treatment goal   identifies future-oriented goal     Problem: Behavioral Health Plan of Care  Goal: Adheres to Safety Considerations for Self and Others  Outcome: Improving  Goal: Optimized Coping Skills in Response to Life Stressors  Outcome: Improving  Patient was encouraged to be more visible in the milieu, though she was reluctant she agreed after staff locked her room, no interactions with others noted, she presented with confused sad affect unable to communicate need, endorsed suicidal ideations without an intent to self harm, refused all her scheduled medications, patient per medication orders was given IM Zyprexa 10 mg, no other behavioral concerns noted.

## 2021-03-21 PROCEDURE — 250N000013 HC RX MED GY IP 250 OP 250 PS 637: Performed by: NURSE PRACTITIONER

## 2021-03-21 PROCEDURE — 124N000002 HC R&B MH UMMC

## 2021-03-21 PROCEDURE — 250N000013 HC RX MED GY IP 250 OP 250 PS 637: Performed by: PSYCHIATRY & NEUROLOGY

## 2021-03-21 RX ADMIN — POLYETHYLENE GLYCOL 3350 17 G: 17 POWDER, FOR SOLUTION ORAL at 08:00

## 2021-03-21 RX ADMIN — RAMELTEON 8 MG: 8 TABLET ORAL at 19:58

## 2021-03-21 RX ADMIN — LAMOTRIGINE 25 MG: 25 TABLET ORAL at 08:01

## 2021-03-21 RX ADMIN — VENLAFAXINE HYDROCHLORIDE 225 MG: 150 CAPSULE, EXTENDED RELEASE ORAL at 08:01

## 2021-03-21 RX ADMIN — ROPINIROLE HYDROCHLORIDE 1 MG: 1 TABLET, FILM COATED ORAL at 19:58

## 2021-03-21 RX ADMIN — QUETIAPINE FUMARATE 300 MG: 300 TABLET, EXTENDED RELEASE ORAL at 08:01

## 2021-03-21 RX ADMIN — QUETIAPINE FUMARATE 300 MG: 300 TABLET, EXTENDED RELEASE ORAL at 19:58

## 2021-03-21 RX ADMIN — PROTRIPTYLINE HYDROCHLORIDE 10 MG: 10 TABLET ORAL at 18:53

## 2021-03-21 ASSESSMENT — ACTIVITIES OF DAILY LIVING (ADL)
DRESS: INDEPENDENT
LAUNDRY: WITH SUPERVISION
HYGIENE/GROOMING: INDEPENDENT
DRESS: INDEPENDENT
LAUNDRY: WITH SUPERVISION
ORAL_HYGIENE: INDEPENDENT
ORAL_HYGIENE: INDEPENDENT
HYGIENE/GROOMING: INDEPENDENT

## 2021-03-21 NOTE — PLAN OF CARE
Problem: Mood Impairment (Psychotic Signs/Symptoms)  Goal: Improved Mood Symptoms (Psychotic Signs/Symptoms)  Outcome: No Change    Pt endorsed having AH, stating the voices are not too loud. Also endorsed having chronic SI/SIB thoughts, but contracts for safety here. Pt spent time in the lounge watching T.V, but kept to self and was isolative and withdrawn. Pt was compliant in taking all her evening medications.

## 2021-03-21 NOTE — PLAN OF CARE
Problem: Behavioral Health Plan of Care  Goal: Plan of Care Review  Outcome: Improving  Flowsheets (Taken 3/21/2021 0907)  Plan of Care Reviewed With: patient  Patient Agreement with Plan of Care: agrees    1004  Pt slept for 7.5 hrs during the night.She was pleasant and medication compliant this morning .She refused to leave her room at 0930 as ordered.Will lock her room door at noon.Pt has a flat affect.She endorsed anxiety, depression and auditory hallucination( voices telling her that it is time to die). She says her mood is low. Pt is isolative to her room.No c/o pain/no meds SE.    1443  Pt showered and has been out of her room since 1030.She is isolative in the lounge but watching TV quietly.

## 2021-03-22 PROCEDURE — U0003 INFECTIOUS AGENT DETECTION BY NUCLEIC ACID (DNA OR RNA); SEVERE ACUTE RESPIRATORY SYNDROME CORONAVIRUS 2 (SARS-COV-2) (CORONAVIRUS DISEASE [COVID-19]), AMPLIFIED PROBE TECHNIQUE, MAKING USE OF HIGH THROUGHPUT TECHNOLOGIES AS DESCRIBED BY CMS-2020-01-R: HCPCS | Performed by: NURSE PRACTITIONER

## 2021-03-22 PROCEDURE — U0005 INFEC AGEN DETEC AMPLI PROBE: HCPCS | Performed by: NURSE PRACTITIONER

## 2021-03-22 PROCEDURE — 99232 SBSQ HOSP IP/OBS MODERATE 35: CPT | Performed by: NURSE PRACTITIONER

## 2021-03-22 PROCEDURE — 250N000013 HC RX MED GY IP 250 OP 250 PS 637: Performed by: PSYCHIATRY & NEUROLOGY

## 2021-03-22 PROCEDURE — 124N000002 HC R&B MH UMMC

## 2021-03-22 PROCEDURE — 250N000013 HC RX MED GY IP 250 OP 250 PS 637: Performed by: NURSE PRACTITIONER

## 2021-03-22 RX ADMIN — QUETIAPINE FUMARATE 300 MG: 300 TABLET, EXTENDED RELEASE ORAL at 08:42

## 2021-03-22 RX ADMIN — VENLAFAXINE HYDROCHLORIDE 225 MG: 150 CAPSULE, EXTENDED RELEASE ORAL at 08:42

## 2021-03-22 RX ADMIN — QUETIAPINE FUMARATE 300 MG: 300 TABLET, EXTENDED RELEASE ORAL at 19:14

## 2021-03-22 RX ADMIN — ROPINIROLE HYDROCHLORIDE 1 MG: 1 TABLET, FILM COATED ORAL at 19:14

## 2021-03-22 RX ADMIN — HYDROXYZINE HYDROCHLORIDE 25 MG: 25 TABLET, FILM COATED ORAL at 18:10

## 2021-03-22 RX ADMIN — POLYETHYLENE GLYCOL 3350 17 G: 17 POWDER, FOR SOLUTION ORAL at 08:42

## 2021-03-22 RX ADMIN — PROTRIPTYLINE HYDROCHLORIDE 10 MG: 10 TABLET ORAL at 18:06

## 2021-03-22 RX ADMIN — LAMOTRIGINE 25 MG: 25 TABLET ORAL at 08:42

## 2021-03-22 RX ADMIN — RAMELTEON 8 MG: 8 TABLET ORAL at 19:14

## 2021-03-22 ASSESSMENT — ACTIVITIES OF DAILY LIVING (ADL)
DRESS: INDEPENDENT
ORAL_HYGIENE: INDEPENDENT
LAUNDRY: WITH SUPERVISION
ORAL_HYGIENE: INDEPENDENT
LAUNDRY: WITH SUPERVISION
HYGIENE/GROOMING: INDEPENDENT
HYGIENE/GROOMING: INDEPENDENT
DRESS: INDEPENDENT

## 2021-03-22 NOTE — PLAN OF CARE
Patient is approached for check in.  Patient affect is flat and blunted.  Speech is low with minimal response.  Patient stares straight ahead at the television.  Patient declines to check in, in a private place.  Patient denies mental health symptoms. Is compliant with bedtime medications.  Denies any questions or concerns. Patient shown no unsafe behaviors.  Did not receive any PRN's this shift.  Continue with plan of care.

## 2021-03-22 NOTE — PLAN OF CARE
Pt. remains in her room the entire shift.  Pt. states that her auditory hallucination is a 4 out of 10,  with 10 being high.  Pt. denies suicidal ideation, self -injurious behavior at this time.  Pt. Is accepting her medication without incident.

## 2021-03-22 NOTE — PLAN OF CARE
Pt appeared to sleep through the night. No safety issues or concerns reported. Will continue to monitor. Slept 7.5 hours of sleep

## 2021-03-22 NOTE — PROGRESS NOTES
[facility],  Psychiatric Progress Note      Impression:     Misty Parham is a 35-year-old female admitted to [facility] on 2/10/2021.  She was admitted under ongoing civil commitment MI with Bhargav in [facility] after taking 4-5 tabs of Unisom, 4-5 tabs of Ibuprofen and 4-5 tabs of Tylenol twice daily for 3 months in an attempt to commit suicide.  She was experiencing auditory hallucinations of Satan's voice commanding her to commit suicide.  Upon admission, provisional discharge was not revoked.  She had stopped taking medications several weeks prior to admission.  Since admission, Seroquel XR, Effexor ER, Rozerem and Protriptyline were restarted.  A Lamictal titration was restarted.  PRNs of Klonopin, Seroquel, Hydroxyzine Zyprexa and Trazodone were initiated.  She began refusing medications on 2/23 and has been taking them inconsistently since.  She was spending nearly all of her time in her room and showed little motivation for treatment, so a room lock out was initiated as it has been during previous hospitalizations.  She continues to report auditory hallucinations of Satan instructing her to commit suicide, and to not take her medications.  She continues to report suicidal ideation.  Insight is poor.  She has been intermittently superficially scratching her forearms, and her diet order was changed to finger foods; staff are checking her room to ensure she doesn't have plasticware or other objects with which to scratch herself.  A 72-hour hold was placed 2/24 after she requested discharge, and her provisional discharge was revoked.         Diagnoses:     Borderline personality disorder  Major depressive disorder, recurrent, severe with psychotic features  Generalized anxiety disorder         Plan:     Medications:  Continue Lamictal titration, currently 25 mg daily due to her inconsistent adherence.  Continue Rozerem 8 mg at HS.  Continue  "Seroquel  mg BID with a back up of IM Zyprexa available per Bhargav.  Continue Protriptyline 10 mg with dinner.  Continue PRNs of Klonopin, Seroquel, Hydroxyzine, Zyprexa and Trazodone.     Lock out of room from 9:30 AM - 12 PM, 1 PM - 3 PM and 5 PM to 9 PM.    Weekly COVID-19 test due today.    She is under commitment MI with Bhargav (Zyprexa, Seroquel, Latuda, Abilify) in [facility].  Her provisional discharge was revoked.  She has had ECT in the past but had cognitive impairment with a MoCA score of 9/30 and improvements in sympotms noted only on treatment days.  Her mother will not allow her to return to her home.  Plan to pursue CADI funding and adult foster care placement, possibly at [facility].  CADI interview was completed 3/16.   She has outpatient psychiatry, therapy and case management.      Attestation:  Patient has been seen and evaluated by me, WILLIAN Sánchez CNP  The patient was counseled on nature of illness and treatment plan/options  Care was coordinated with treatment team     Clinical Global Impressions  First:  Considering your total clinical experience with this particular patient population, how severe are the patient's symptoms at this time?: 7 (02/11/21 1842)  Compared to the patient's condition at the START of treatment, this patient's condition is: 4 (02/11/21 1842)  Most recent:  Considering your total clinical experience with this particular patient population, how severe are the patient's symptoms at this time?: 7 (03/17/21 0528)  Compared to the patient's condition at the START of treatment, this patient's condition is: 4 (03/17/21 0528)          Interim History:     The patient's care was discussed with the treatment team and chart notes were reviewed.  Pt was documented as sleeping well during the weekend overnight shifts.  She refused her PO medications Friday evening but has been taking them since \"because I don't like the shot\" and finds IM Zyprexa " "to have more side effects and is less effective than Seroquel XR.  She characterizes her mood as \"okay.\"  She reports continuing auditory hallucinations of Satan's voice urging her not to take her medications and to commit suicide.  These remain constant and at the volume of a whisper.  She continues to endorse suicidal ideation with a plan to strangle herself and contracts for safety on the unit.  She reports she has no hope that symptoms will improve.  She denies side effects from medications.  States she talked to her ex- about their children leaving for [place] on 4/16 and continues to have mixed feelings about this, as it is a good opportunity for them, but she will also miss them.           Medications:     Current Facility-Administered Medications   Medication     - MEDICATION INSTRUCTIONS -     acetaminophen (TYLENOL) tablet 650 mg     alum & mag hydroxide-simethicone (MAALOX) suspension 30 mL     clonazePAM (klonoPIN) tablet 0.5 mg     hydrOXYzine (ATARAX) tablet 25 mg     lamoTRIgine (LaMICtal) tablet 25 mg     QUEtiapine ER (SEROquel XR) 24 hr tablet 300 mg    Or     OLANZapine (zyPREXA) injection 10 mg     QUEtiapine ER (SEROquel XR) 24 hr tablet 300 mg    Or     OLANZapine (zyPREXA) injection 10 mg     OLANZapine (zyPREXA) tablet 10 mg    Or     OLANZapine (zyPREXA) injection 10 mg     polyethylene glycol (MIRALAX) Packet 17 g     protriptyline (VIVACTIL) tablet 10 mg     QUEtiapine (SEROquel) tablet 25 mg     ramelteon (ROZEREM) tablet 8 mg     rOPINIRole (REQUIP) tablet 1 mg     traZODone (DESYREL) tablet 50 mg     venlafaxine (EFFEXOR-XR) 24 hr capsule 225 mg             Allergies:   No Known Allergies         Psychiatric Examination:     /79 (BP Location: Left arm)   Pulse 107   Temp 98.1  F (36.7  C) (Oral)   Resp 16   Ht 1.651 m (5' 5\")   Wt 116.2 kg (256 lb 1.6 oz)   SpO2 98%   BMI 42.62 kg/m      Appearance: awake, alert and adequate grooming/hygiene   Attitude:  cooperative " "  Eye Contact:  fair  Mood:  \"okay,\" depressed  Affect:  intensity is blunted  Speech:  low volume   Language: fluent and intact in English  Psychomotor, Gait, Musculoskeletal:  no evidence of tardive dyskinesia, dystonia, or tics  Thought Process:  goal oriented, linear, less than logical  Associations:  no loose associations  Thought Content:  endorses suicidal ideation and auditory hallucinations of Channing's voice instructing her to commit suicide, contracts for safety on unit, denies HI, denies visual hallucinations, endorses urges to cut self as a form of self-injury but not with suicidal intent  Insight:  limited  Judgement:  limited  Oriented to:  month/year, time, person, and place   Attention Span and Concentration:  fair  Recent and Remote Memory:  intact  Fund of Knowledge:  appropriate         Labs:     No results found for this or any previous visit (from the past 24 hour(s)).  "

## 2021-03-23 PROCEDURE — 124N000002 HC R&B MH UMMC

## 2021-03-23 PROCEDURE — 250N000013 HC RX MED GY IP 250 OP 250 PS 637: Performed by: NURSE PRACTITIONER

## 2021-03-23 PROCEDURE — 99232 SBSQ HOSP IP/OBS MODERATE 35: CPT | Performed by: NURSE PRACTITIONER

## 2021-03-23 PROCEDURE — 250N000013 HC RX MED GY IP 250 OP 250 PS 637: Performed by: PSYCHIATRY & NEUROLOGY

## 2021-03-23 RX ADMIN — RAMELTEON 8 MG: 8 TABLET ORAL at 18:22

## 2021-03-23 RX ADMIN — VENLAFAXINE HYDROCHLORIDE 225 MG: 150 CAPSULE, EXTENDED RELEASE ORAL at 10:15

## 2021-03-23 RX ADMIN — ROPINIROLE HYDROCHLORIDE 1 MG: 1 TABLET, FILM COATED ORAL at 18:22

## 2021-03-23 RX ADMIN — QUETIAPINE FUMARATE 300 MG: 300 TABLET, EXTENDED RELEASE ORAL at 10:16

## 2021-03-23 RX ADMIN — QUETIAPINE FUMARATE 300 MG: 300 TABLET, EXTENDED RELEASE ORAL at 22:01

## 2021-03-23 RX ADMIN — LAMOTRIGINE 25 MG: 25 TABLET ORAL at 10:15

## 2021-03-23 RX ADMIN — PROTRIPTYLINE HYDROCHLORIDE 10 MG: 10 TABLET ORAL at 18:20

## 2021-03-23 NOTE — PLAN OF CARE
"Pt refused meds with many attempts but finally took them with two staff in room.  Pt remains isolative and sleeping in bed.  Pt will not engage in conversations just states \"I will do it later,\" when asked to take meds.  Pt remains isolative to her room all day with the exception of coming out for lunch.  Pt is reading in room.  Pt admits to hearing voices and states they are saying \"its time to end your life.\"  Pt states they are telling her to suffocate herself.  Pt contracts for safety while she is here.    "

## 2021-03-23 NOTE — PLAN OF CARE
Problem: Behavioral Health Plan of Care  Goal: Optimized Coping Skills in Response to Life Stressors  Outcome: No Change   Pt mood was visible in the milieu and other times went in her room. She watched TV and was kept to herself. She was isolative and withdrawn all shift. Pt endorse hearing voices which present as whispers telling her to die. Pt reported anxiety and depression at 7/10 and was given hydroxyzine. Writer check with patient later and she reported no change from taking hydroxyzine. She reported that her depression and anxiety were at baseline. Pt ate all her dinner and says she has SI thoughts without plan. Continue to monitor. Pt is on comm/[facility] at this time.

## 2021-03-23 NOTE — PROGRESS NOTES
[facility],  Psychiatric Progress Note      Impression:     Misty Parham is a 35-year-old female admitted to [facility] on 2/10/2021.  She was admitted under ongoing civil commitment MI with Bhargav in [facility] after taking 4-5 tabs of Unisom, 4-5 tabs of Ibuprofen and 4-5 tabs of Tylenol twice daily for 3 months in an attempt to commit suicide.  She was experiencing auditory hallucinations of Satan's voice commanding her to commit suicide.  Upon admission, provisional discharge was not revoked.  She had stopped taking medications several weeks prior to admission.  Since admission, Seroquel XR, Effexor ER, Rozerem and Protriptyline were restarted.  A Lamictal titration was restarted.  PRNs of Klonopin, Seroquel, Hydroxyzine Zyprexa and Trazodone were initiated.  She began refusing medications on 2/23 and has been taking them inconsistently since.  She was spending nearly all of her time in her room and showed little motivation for treatment, so a room lock out was initiated as it has been during previous hospitalizations.  She continues to report auditory hallucinations of Satan instructing her to commit suicide, and to not take her medications.  She continues to report suicidal ideation.  Insight is poor.  She has been intermittently superficially scratching her forearms, and her diet order was changed to finger foods; staff are checking her room to ensure she doesn't have plasticware or other objects with which to scratch herself.  A 72-hour hold was placed 2/24 after she requested discharge, and her provisional discharge was revoked.         Diagnoses:     Borderline personality disorder  Major depressive disorder, recurrent, severe with psychotic features  Generalized anxiety disorder         Plan:     Medications:  Continue Lamictal titration, currently 25 mg daily due to her inconsistent adherence.  Continue Rozerem 8 mg at HS.  Continue  Seroquel  mg BID with a back up of IM Zyprexa available per [facility].  Continue Protriptyline 10 mg with dinner.  Continue PRNs of Klonopin, Seroquel, Hydroxyzine, Zyprexa and Trazodone.     Lock out of room from 9:30 AM - 12 PM, 1 PM - 3 PM and 5 PM to 9 PM.    She is under commitment MI with Bhargav (Zyprexa, Seroquel, Latuda, Abilify) in [facility].  Her provisional discharge was revoked.  She has had ECT in the past but had cognitive impairment with a MoCA score of 9/30 and improvements in sympotms noted only on treatment days.  Her mother will not allow her to return to her home.  Plan to pursue CADI funding and adult foster care placement, possibly at [facility].  CADI interview was completed 3/16.   She has outpatient psychiatry, therapy and case management.      Attestation:  Patient has been seen and evaluated by me, WILLIAN Sánchez CNP  The patient was counseled on nature of illness and treatment plan/options  Care was coordinated with treatment team     Clinical Global Impressions  First:  Considering your total clinical experience with this particular patient population, how severe are the patient's symptoms at this time?: 7 (02/11/21 1842)  Compared to the patient's condition at the START of treatment, this patient's condition is: 4 (02/11/21 1842)  Most recent:  Considering your total clinical experience with this particular patient population, how severe are the patient's symptoms at this time?: 7 (03/17/21 0528)  Compared to the patient's condition at the START of treatment, this patient's condition is: 4 (03/17/21 0528)          Interim History:     The patient's care was discussed with the treatment team and chart notes were reviewed.  Pt was documented as sleeping 7 hours during the overnight shift.  She took her meds as prescribed yesterday and also took PRN Hydroxyzine x 1.  She has been spending much of her time in her room, inconsistently adherent to room lock out.  " She says her mood is \"alright.\"  States she feels tired today, having stayed up later than usual reading 2-3 chapters of a book.  Her concentration is slightly improved.  Reports auditory hallucinations of Channing's voice are constant while she is awake, at the volume of a whisper, commanding her to not take medications and to commit suicide.  She contracts for safety on the unit.  States that she feels frustrated about being hospitalized.  She said she would feel better about adult foster care placement if she had some additional information on how this is financed.  [facility] notified.           Medications:     Current Facility-Administered Medications   Medication     - MEDICATION INSTRUCTIONS -     acetaminophen (TYLENOL) tablet 650 mg     alum & mag hydroxide-simethicone (MAALOX) suspension 30 mL     clonazePAM (klonoPIN) tablet 0.5 mg     hydrOXYzine (ATARAX) tablet 25 mg     lamoTRIgine (LaMICtal) tablet 25 mg     QUEtiapine ER (SEROquel XR) 24 hr tablet 300 mg    Or     OLANZapine (zyPREXA) injection 10 mg     QUEtiapine ER (SEROquel XR) 24 hr tablet 300 mg    Or     OLANZapine (zyPREXA) injection 10 mg     OLANZapine (zyPREXA) tablet 10 mg    Or     OLANZapine (zyPREXA) injection 10 mg     polyethylene glycol (MIRALAX) Packet 17 g     protriptyline (VIVACTIL) tablet 10 mg     QUEtiapine (SEROquel) tablet 25 mg     ramelteon (ROZEREM) tablet 8 mg     rOPINIRole (REQUIP) tablet 1 mg     traZODone (DESYREL) tablet 50 mg     venlafaxine (EFFEXOR-XR) 24 hr capsule 225 mg             Allergies:   No Known Allergies         Psychiatric Examination:     /77 (BP Location: Left arm)   Pulse 93   Temp 97.5  F (36.4  C) (Oral)   Resp 16   Ht 1.651 m (5' 5\")   Wt 116.2 kg (256 lb 1.6 oz)   SpO2 98%   BMI 42.62 kg/m      Appearance: awake, alert and adequate grooming/hygiene   Attitude:  cooperative   Eye Contact:  fair  Mood:  \"alright\" depressed  Affect:  intensity is blunted  Speech:  low volume   Language: " fluent and intact in English  Psychomotor, Gait, Musculoskeletal:  no evidence of tardive dyskinesia, dystonia, or tics  Thought Process:  goal oriented, linear, less than logical  Associations:  no loose associations  Thought Content:  endorses suicidal ideation and auditory hallucinations of Channing's voice instructing her to commit suicide, contracts for safety on unit, denies HI, denies visual hallucinations, endorses urges to cut self as a form of self-injury but not with suicidal intent  Insight:  limited  Judgement:  limited  Oriented to:  month/year, time, person, and place   Attention Span and Concentration:  fair  Recent and Remote Memory:  intact  Fund of Knowledge:  appropriate         Labs:     No results found for this or any previous visit (from the past 24 hour(s)).

## 2021-03-24 PROCEDURE — 250N000013 HC RX MED GY IP 250 OP 250 PS 637: Performed by: PSYCHIATRY & NEUROLOGY

## 2021-03-24 PROCEDURE — 250N000011 HC RX IP 250 OP 636: Performed by: NURSE PRACTITIONER

## 2021-03-24 PROCEDURE — 124N000002 HC R&B MH UMMC

## 2021-03-24 PROCEDURE — 99232 SBSQ HOSP IP/OBS MODERATE 35: CPT | Performed by: NURSE PRACTITIONER

## 2021-03-24 PROCEDURE — 250N000013 HC RX MED GY IP 250 OP 250 PS 637: Performed by: NURSE PRACTITIONER

## 2021-03-24 RX ADMIN — QUETIAPINE FUMARATE 300 MG: 300 TABLET, EXTENDED RELEASE ORAL at 08:46

## 2021-03-24 RX ADMIN — VENLAFAXINE HYDROCHLORIDE 225 MG: 150 CAPSULE, EXTENDED RELEASE ORAL at 08:46

## 2021-03-24 RX ADMIN — OLANZAPINE 10 MG: 10 INJECTION, POWDER, FOR SOLUTION INTRAMUSCULAR at 21:50

## 2021-03-24 RX ADMIN — LAMOTRIGINE 25 MG: 25 TABLET ORAL at 08:46

## 2021-03-24 RX ADMIN — POLYETHYLENE GLYCOL 3350 17 G: 17 POWDER, FOR SOLUTION ORAL at 08:46

## 2021-03-24 RX ADMIN — PROTRIPTYLINE HYDROCHLORIDE 10 MG: 10 TABLET ORAL at 19:08

## 2021-03-24 ASSESSMENT — ACTIVITIES OF DAILY LIVING (ADL)
LAUNDRY: WITH SUPERVISION
LAUNDRY: WITH SUPERVISION
HYGIENE/GROOMING: INDEPENDENT
DRESS: INDEPENDENT
HYGIENE/GROOMING: INDEPENDENT
ORAL_HYGIENE: INDEPENDENT
ORAL_HYGIENE: INDEPENDENT
DRESS: INDEPENDENT

## 2021-03-24 NOTE — PROGRESS NOTES
[facility],  Psychiatric Progress Note      Impression:     Misty Parham is a 35-year-old female admitted to [facility] on 2/10/2021.  She was admitted under ongoing civil commitment MI with Bhargav in [facility] after taking 4-5 tabs of Unisom, 4-5 tabs of Ibuprofen and 4-5 tabs of Tylenol twice daily for 3 months in an attempt to commit suicide.  She was experiencing auditory hallucinations of Satan's voice commanding her to commit suicide.  Upon admission, provisional discharge was not revoked.  She had stopped taking medications several weeks prior to admission.  Since admission, Seroquel XR, Effexor ER, Rozerem and Protriptyline were restarted.  A Lamictal titration was restarted.  PRNs of Klonopin, Seroquel, Hydroxyzine Zyprexa and Trazodone were initiated.  She began refusing medications on 2/23 and has been taking them inconsistently since.  She was spending nearly all of her time in her room and showed little motivation for treatment, so a room lock out was initiated as it has been during previous hospitalizations.  She continues to report auditory hallucinations of Satan instructing her to commit suicide, and to not take her medications.  She continues to report suicidal ideation.  Insight is poor.  She has been intermittently superficially scratching her forearms, and her diet order was changed to finger foods; staff are checking her room to ensure she doesn't have plasticware or other objects with which to scratch herself.  A 72-hour hold was placed 2/24 after she requested discharge, and her provisional discharge was revoked.         Diagnoses:     Borderline personality disorder  Major depressive disorder, recurrent, severe with psychotic features  Generalized anxiety disorder         Plan:     Medications:  Continue Lamictal titration, currently 25 mg daily due to her inconsistent adherence.  Continue Rozerem 8 mg at HS.  Continue  Seroquel  mg BID with a back up of IM Zyprexa available per [facility].  Continue Protriptyline 10 mg with dinner.  Continue PRNs of Klonopin, Seroquel, Hydroxyzine, Zyprexa and Trazodone.     Lock out of room from 9:30 AM - 12 PM, 1 PM - 3 PM and 5 PM to 9 PM.    She is under commitment MI with [facility] (Zyprexa, Seroquel, Latuda, Abilify) in [facility].  Her provisional discharge was revoked.  She has had ECT in the past but had cognitive impairment with a MoCA score of 9/30 and improvements in sympotms noted only on treatment days.  Her mother will not allow her to return to her home.  Plan to pursue CADI funding and adult foster care placement, possibly at [facility].  CADI interview was completed 3/16.   She has outpatient psychiatry, therapy and case management.      Attestation:  Patient has been seen and evaluated by me, WILLIAN Sánchez CNP  The patient was counseled on nature of illness and treatment plan/options  Care was coordinated with treatment team       Clinical Global Impressions  First:  Considering your total clinical experience with this particular patient population, how severe are the patient's symptoms at this time?: 7 (02/11/21 1842)  Compared to the patient's condition at the START of treatment, this patient's condition is: 4 (02/11/21 1842)  Most recent:  Considering your total clinical experience with this particular patient population, how severe are the patient's symptoms at this time?: 7 (03/17/21 0528)  Compared to the patient's condition at the START of treatment, this patient's condition is: 4 (03/17/21 0528)        Interim History:     The patient's care was discussed with the treatment team and chart notes were reviewed.  Pt was documented as sleeping 6.75 hours during the overnight shift.  She took medications as prescribed yesterday.  She has been inconsistently adherent to room lock out, more adherent in the afternoon and evening than during the  "daytime.  Staff found a broken plastic spoon in her room yesterday.  She has been spending time reading and watching TV.  She continues to refuse to attend groups.  States she is feeling more hopeful about adult foster care placement after talking to the [facility] yesterday.  Mood is \"alright.\"  She reports no significant changes in suicidal ideation or urges to superficially cut herself.  She reports continuing auditory hallucinations of Satan's voice telling her to commit suicide and to not take medications.  She says her energy is low.  She feels \"emotionally and physically tired.\"  Her concentration is \"slowly improving.\"           Medications:     Current Facility-Administered Medications   Medication     - MEDICATION INSTRUCTIONS -     acetaminophen (TYLENOL) tablet 650 mg     alum & mag hydroxide-simethicone (MAALOX) suspension 30 mL     clonazePAM (klonoPIN) tablet 0.5 mg     hydrOXYzine (ATARAX) tablet 25 mg     lamoTRIgine (LaMICtal) tablet 25 mg     QUEtiapine ER (SEROquel XR) 24 hr tablet 300 mg    Or     OLANZapine (zyPREXA) injection 10 mg     QUEtiapine ER (SEROquel XR) 24 hr tablet 300 mg    Or     OLANZapine (zyPREXA) injection 10 mg     OLANZapine (zyPREXA) tablet 10 mg    Or     OLANZapine (zyPREXA) injection 10 mg     polyethylene glycol (MIRALAX) Packet 17 g     protriptyline (VIVACTIL) tablet 10 mg     QUEtiapine (SEROquel) tablet 25 mg     ramelteon (ROZEREM) tablet 8 mg     rOPINIRole (REQUIP) tablet 1 mg     traZODone (DESYREL) tablet 50 mg     venlafaxine (EFFEXOR-XR) 24 hr capsule 225 mg             Allergies:   No Known Allergies         Psychiatric Examination:     /78 (BP Location: Left arm)   Pulse 88   Temp 98  F (36.7  C)   Resp 16   Ht 1.651 m (5' 5\")   Wt 116.2 kg (256 lb 1.6 oz)   SpO2 98%   BMI 42.62 kg/m      Appearance: awake, alert and adequate grooming/hygiene   Attitude:  cooperative   Eye Contact:  fair  Mood:  \"alright\" depressed  Affect:  intensity is " blunted  Speech:  low volume   Language: fluent and intact in English  Psychomotor, Gait, Musculoskeletal:  no evidence of tardive dyskinesia, dystonia, or tics  Thought Process:  goal oriented, linear, less than logical  Associations:  no loose associations  Thought Content:  endorses suicidal ideation and auditory hallucinations of Channing's voice instructing her to commit suicide, contracts for safety on unit, denies HI, denies visual hallucinations, endorses urges to cut self as a form of self-injury but not with suicidal intent  Insight:  limited  Judgement:  limited  Oriented to:  month/year, time, person, and place   Attention Span and Concentration:  fair  Recent and Remote Memory:  intact  Fund of Knowledge:  appropriate         Labs:     No results found for this or any previous visit (from the past 24 hour(s)).

## 2021-03-24 NOTE — PLAN OF CARE
Pt awake at beginning of shift. Appeared to sleep through the morning. No safety issues or concerns reported. Will continue to monitor. Pt slept 6.75 hours.

## 2021-03-24 NOTE — PLAN OF CARE
Pt. with encouragement willing to engage in a 1:1.  Pt. Is laying in bed refusing to get up, engage in any unit activities.  Pt. with encouragement did accept her morning medications.  Pt.'s affect is blunted and flat.  Pt. reports that she is depressed, a 5 out of 10 with 10 being high.  Pt. also states that her level of suicidal ideation and self injurious behavior is a 5 out of 10.  Pt. states that she has  intermittent thoughts only of the above, no active intent or plan at this time.  Pt. reports that the auditory hallucination is a 4 out of 10, that the voice is somewhat diminished.  Pt. states that she feels safe on the unit and she will not engage in activity of self harm to herself.

## 2021-03-24 NOTE — PROGRESS NOTES
Pt had been resting in her room early in the evening.  Unit staff did a routine room search and found a broken plastic spoon with a sharp edge.  Pt was locked out of her room at dinner time and remains sitting in the lounge after 9:30 pm.  She continues to endorse passive SI and command voices however appears calmly sitting in the lounge watching tv with others.  She declined to take her scheduled Seroquel until 10 pm.

## 2021-03-24 NOTE — PLAN OF CARE
Late entry    3/23/21 @0700hrs  Pt up at beginning of shift quietly sitting on side of bed reading for approximately thirty minutes.Pt appeared to sleep the rest of the shift. No safety issues or concerns reported. Will continue to monitor. Pt slept 7 hours.

## 2021-03-24 NOTE — PROGRESS NOTES
"Patient remains isolative and withdrawn. She spent most of the shift in her room reading a pocket book. Patient's mood is calm but with a flat affect. Patient says that she hears voices telling her to \"end my life\". When asked to further elaborate what she meant, patient said \"I don't want to talk about it\". Patient contracted for safety. Patient was approached for her bedtime medications but she said that it's still too early for her to take it and preferred to take it at 10:00 p.m.     Patient took her Seroquel 300 mgs at 10:01 p.m.  "

## 2021-03-25 PROCEDURE — 124N000002 HC R&B MH UMMC

## 2021-03-25 PROCEDURE — 99233 SBSQ HOSP IP/OBS HIGH 50: CPT | Performed by: NURSE PRACTITIONER

## 2021-03-25 PROCEDURE — 250N000013 HC RX MED GY IP 250 OP 250 PS 637: Performed by: NURSE PRACTITIONER

## 2021-03-25 PROCEDURE — 250N000013 HC RX MED GY IP 250 OP 250 PS 637: Performed by: PSYCHIATRY & NEUROLOGY

## 2021-03-25 RX ORDER — LAMOTRIGINE 25 MG/1
50 TABLET ORAL DAILY
Status: DISCONTINUED | OUTPATIENT
Start: 2021-03-26 | End: 2021-04-07 | Stop reason: HOSPADM

## 2021-03-25 RX ADMIN — QUETIAPINE FUMARATE 300 MG: 300 TABLET, EXTENDED RELEASE ORAL at 08:32

## 2021-03-25 RX ADMIN — VENLAFAXINE HYDROCHLORIDE 225 MG: 150 CAPSULE, EXTENDED RELEASE ORAL at 08:32

## 2021-03-25 RX ADMIN — QUETIAPINE FUMARATE 300 MG: 300 TABLET, EXTENDED RELEASE ORAL at 19:36

## 2021-03-25 RX ADMIN — RAMELTEON 8 MG: 8 TABLET ORAL at 19:36

## 2021-03-25 RX ADMIN — POLYETHYLENE GLYCOL 3350 17 G: 17 POWDER, FOR SOLUTION ORAL at 08:31

## 2021-03-25 RX ADMIN — LAMOTRIGINE 25 MG: 25 TABLET ORAL at 08:32

## 2021-03-25 RX ADMIN — PROTRIPTYLINE HYDROCHLORIDE 10 MG: 10 TABLET ORAL at 18:21

## 2021-03-25 RX ADMIN — ROPINIROLE HYDROCHLORIDE 1 MG: 1 TABLET, FILM COATED ORAL at 19:37

## 2021-03-25 NOTE — PROGRESS NOTES
"Pt was locked out of her room at 5:30 pm to 9 pm and appeared to be calmly sitting in the lounge watching tv much of the evening.  She took He Vivactil when offered and stated that her mood was fine.  Reports her voices were not as severe this evening.  Pt did however refuse her medications until \"late\" and staff allowed her to approach staff when she was ready.  When staff approached her at 10 pm she refused it so IM Zyprexa was given per [facility] order.  Pt did not struggle with staff but stated she didn't want to take any medications until 11 pm--with the intention that it will be forgotten.  Pt had commented earlier that she will not be taking medications if she were to return home.  Staff found a bent spoon in Pt's possession as she has been noted to break utensils and scratch herself.  Room search was completed and no other items of concern was found.  She remains resting on her bed at this time.   "

## 2021-03-25 NOTE — PROGRESS NOTES
[facility],  Psychiatric Progress Note      Impression:     Misty Parham is a 35-year-old female admitted to [facility] on 2/10/2021.  She was admitted under ongoing civil commitment MI with Bhargav in [facility] after taking 4-5 tabs of Unisom, 4-5 tabs of Ibuprofen and 4-5 tabs of Tylenol twice daily for 3 months in an attempt to commit suicide.  She was experiencing auditory hallucinations of Satan's voice commanding her to commit suicide.  Upon admission, provisional discharge was not revoked.  She had stopped taking medications several weeks prior to admission.  Since admission, Seroquel XR, Effexor ER, Rozerem and Protriptyline were restarted.  A Lamictal titration was restarted but has been slow to progress given her inconsistent adherence.  PRNs of Klonopin, Seroquel, Hydroxyzine Zyprexa and Trazodone were initiated.  She began refusing medications on 2/23 and has been taking them inconsistently since.  She was spending nearly all of her time in her room and showed little motivation for treatment, so a room lock out was initiated as it has been during previous hospitalizations.  She continues to report auditory hallucinations of Satan instructing her to commit suicide, and to not take her medications.  She continues to report suicidal ideation.  Insight is poor.  She has been intermittently superficially scratching her forearms, and her diet order was changed to finger foods; staff are checking her room which is directly across from the desk to ensure she doesn't have plasticware or other objects with which to scratch herself.  On 3/25 she reported she had attempted to strangle herself with linens on 3/23 and 3/24, and linens were removed from her room.  A 72-hour hold was placed 2/24 after she requested discharge, and her provisional discharge was revoked.         Diagnoses:     Borderline personality disorder  Major depressive disorder,  recurrent, severe with psychotic features  Generalized anxiety disorder         Plan:     Medications:  Increase Lamictal to 50 mg daily as she has taken it every day but one since 3/9. Continue Rozerem 8 mg at HS.  Continue Seroquel  mg BID with a back up of IM Zyprexa available per Bhargav.  Continue Protriptyline 10 mg with dinner.  Continue PRNs of Klonopin, Seroquel, Hydroxyzine, Zyprexa and Trazodone.     Lock out of room from 9:30 AM - 12 PM, 1 PM - 3 PM and 5 PM to 9 PM.    No linens in room.  Order to be reviewed by provider daily.      She is under commitment MI with Bhargav (Zyprexa, Seroquel, Latuda, Abilify) in [facility].  Her provisional discharge was revoked.  She has had ECT in the past but had cognitive impairment with a MoCA score of 9/30 and improvements in sympotms noted only on treatment days.  Her mother will not allow her to return to her home.  Plan to pursue CADI funding and adult foster care placement, possibly at [facility].  CADI interview was completed 3/16.   She has outpatient psychiatry, therapy and case management.      Attestation:  Patient has been seen and evaluated by me, WILLIAN Sánchez CNP  The patient was counseled on nature of illness and treatment plan/options  Care was coordinated with treatment team       Clinical Global Impressions  First:  Considering your total clinical experience with this particular patient population, how severe are the patient's symptoms at this time?: 7 (02/11/21 1842)  Compared to the patient's condition at the START of treatment, this patient's condition is: 4 (02/11/21 1842)  Most recent:  Considering your total clinical experience with this particular patient population, how severe are the patient's symptoms at this time?: 7 (03/25/21 0524)  Compared to the patient's condition at the START of treatment, this patient's condition is: 4 (03/25/21 0524)          Interim History:     The patient's care was discussed  "with the treatment team and chart notes were reviewed.  Pt was documented as sleeping throughout the overnight shift.  She has been spending some time reading.  She has not been attending groups.  Staff found a bent plastic spoon in her room and removed it yesterday.  She refused bedtime medications.  She has taken all but one dose of Lamictal since 3/9, so will increase the dose to 50 mg.  Today, pt said her mood is \"alright, up and down.\"  She reports \"anger and frustration\" related to being hospitalized and her children going to [facility].  States she left some messages for her children, but they have not returned her calls and she has not spoken with them in a few days.  She said that when they do talk, their conversations are good.  She reports auditory hallucinations of Satelizabeth's voice which are a constant whisper, telling her to commit suicide, not take medications, and making derogatory statements about her.  She said that yesterday and the day before, she held sheets around her neck, \"but my hands slipped and were shaking ... I need to be more angry.\"  She was unable to contract for safety with the linens, so they were removed from her room.  Pt denies currently having any items with which she could cut/scratch herself as a form of self-injury and noted that staff \"keep finding them\" when she tries to hide them.         Medications:     Current Facility-Administered Medications   Medication     - MEDICATION INSTRUCTIONS -     acetaminophen (TYLENOL) tablet 650 mg     alum & mag hydroxide-simethicone (MAALOX) suspension 30 mL     clonazePAM (klonoPIN) tablet 0.5 mg     hydrOXYzine (ATARAX) tablet 25 mg     lamoTRIgine (LaMICtal) tablet 25 mg     QUEtiapine ER (SEROquel XR) 24 hr tablet 300 mg    Or     OLANZapine (zyPREXA) injection 10 mg     QUEtiapine ER (SEROquel XR) 24 hr tablet 300 mg    Or     OLANZapine (zyPREXA) injection 10 mg     OLANZapine (zyPREXA) tablet 10 mg    Or     OLANZapine (zyPREXA) injection " "10 mg     polyethylene glycol (MIRALAX) Packet 17 g     protriptyline (VIVACTIL) tablet 10 mg     QUEtiapine (SEROquel) tablet 25 mg     ramelteon (ROZEREM) tablet 8 mg     rOPINIRole (REQUIP) tablet 1 mg     traZODone (DESYREL) tablet 50 mg     venlafaxine (EFFEXOR-XR) 24 hr capsule 225 mg             Allergies:   No Known Allergies         Psychiatric Examination:     /83 (BP Location: Left arm)   Pulse 90   Temp 97.8  F (36.6  C) (Oral)   Resp 16   Ht 1.651 m (5' 5\")   Wt 116.2 kg (256 lb 1.6 oz)   SpO2 98%   BMI 42.62 kg/m      Appearance:  alert and adequate grooming/hygiene   Attitude:  cooperative   Eye Contact:  fair  Mood: \"alright, up and down ... anger and frustration\"  Affect:  intensity is blunted  Speech:  low volume   Language: fluent and intact in English  Psychomotor, Gait, Musculoskeletal:  no evidence of tardive dyskinesia, dystonia, or tics  Thought Process:  goal oriented, linear, less than logical  Associations:  no loose associations  Thought Content:  endorses suicidal ideation and auditory hallucinations of Channing's voice instructing her to commit suicide, unable to contract for safety with linens in her room (plan to strangle selft), denies HI, denies visual hallucinations, endorses urges to cut self as a form of self-injury but not with suicidal intent  Insight:  limited  Judgement:  limited  Oriented to:  month/year, time, person, and place   Attention Span and Concentration:  fair  Recent and Remote Memory:  intact  Fund of Knowledge:  appropriate         Labs:     No results found for this or any previous visit (from the past 24 hour(s)).  "

## 2021-03-25 NOTE — PLAN OF CARE
BEHAVIORAL TEAM DISCUSSION    Participants: Hina Morris, KARLOS; Sarah Evangelista and Alicia Coto, CTC's; Nayeli SCHWARTZ  Progress: pt continues to intermittently refuse medications.  She continues to report AH of a command nature.  Borderline features complicate presentation.  Recent self reports of attempt 'to strangle' herself resulting in linen restrictions.    Anticipated length of stay: 5-7  Continued Stay Criteria/Rationale: behaviors complicate placement and reflect pt's poor coping abilities.  Pt remains in need of acute care to address SI with self reports of such.  Medical/Physical: uneventful  Precautions:   Behavioral Orders   Procedures     Code 1 - Restrict to Unit     Routine Programming     As clinically indicated     Status 15     Every 15 minutes.     Suicide precautions     Patients on Suicide Precautions should have a Combination Diet ordered that includes a Diet selection(s) AND a Behavioral Tray selection for Safe Tray - with utensils, or Safe Tray - NO utensils       Plan: continue to monitor.  CTC responded to [facility] regarding recent progress.  Pt will continue to meet with psychiatry.  Pt has OP team attempting to assist in placement needs, etc.    Rationale for change in precautions or plan: Room restrictions in place to encourage unit participation and allow for observation and encouragement.

## 2021-03-25 NOTE — PLAN OF CARE
"Pt sat in her room, reading her book at the start of evening shift. Pt did come out to the lounge to eat supper and was medication compliant. Writer checked in with pt and asked if she was having any suicidal thoughts and she stated \"I have chronic thoughts but no plan.\" Pt denies any SIB, AH, VH, and HI. Pt denies any medical issues at this time. Will continue to monitor.   "

## 2021-03-25 NOTE — PLAN OF CARE
Pt was in the lounge for most of the day after being locked out of her room after breakfast. She ate meals and was compliant with her am medications. Pt reported to provider that she had attempted to strangle herself with her linens the past couple days. All linens were removed from her room and she is only ok'ed to have a weighted blanket until the provider decides she is safe to have sheets again. Pt spent time reading and watching tv, not social with peers or attending groups. Hoa still present. Will continue to monitor.

## 2021-03-25 NOTE — PLAN OF CARE
Work Completed:  Reviewed chart and remotely attended team discussion.  Entered team note.  Responded to request by [facility] for update.  [facility] is [facility] fax: [phone]    Discharge plan or goal: stabilize and discharge to [facility]                Barriers to discharge: acuity of mental health, entrenchment in poor coping

## 2021-03-26 PROBLEM — F32.3 MAJOR DEPRESSIVE DISORDER, SINGLE EPISODE, SEVERE WITH PSYCHOTIC FEATURES (H): Status: ACTIVE | Noted: 2020-04-23

## 2021-03-26 PROCEDURE — 250N000013 HC RX MED GY IP 250 OP 250 PS 637: Performed by: NURSE PRACTITIONER

## 2021-03-26 PROCEDURE — 124N000002 HC R&B MH UMMC

## 2021-03-26 PROCEDURE — 250N000013 HC RX MED GY IP 250 OP 250 PS 637: Performed by: PSYCHIATRY & NEUROLOGY

## 2021-03-26 PROCEDURE — 250N000011 HC RX IP 250 OP 636: Performed by: NURSE PRACTITIONER

## 2021-03-26 PROCEDURE — 99232 SBSQ HOSP IP/OBS MODERATE 35: CPT | Performed by: NURSE PRACTITIONER

## 2021-03-26 RX ADMIN — PROTRIPTYLINE HYDROCHLORIDE 10 MG: 10 TABLET ORAL at 19:13

## 2021-03-26 RX ADMIN — LAMOTRIGINE 50 MG: 25 TABLET ORAL at 08:59

## 2021-03-26 RX ADMIN — OLANZAPINE 10 MG: 10 INJECTION, POWDER, FOR SOLUTION INTRAMUSCULAR at 22:09

## 2021-03-26 RX ADMIN — POLYETHYLENE GLYCOL 3350 17 G: 17 POWDER, FOR SOLUTION ORAL at 08:59

## 2021-03-26 RX ADMIN — QUETIAPINE FUMARATE 300 MG: 300 TABLET, EXTENDED RELEASE ORAL at 08:59

## 2021-03-26 RX ADMIN — VENLAFAXINE HYDROCHLORIDE 225 MG: 150 CAPSULE, EXTENDED RELEASE ORAL at 08:59

## 2021-03-26 NOTE — PROGRESS NOTES
[facility],  Psychiatric Progress Note      Impression:     Misty Parham is a 35-year-old female admitted to [facility] on 2/10/2021.  She was admitted under ongoing civil commitment MI with Bhargav in [facility] after taking 4-5 tabs of Unisom, 4-5 tabs of Ibuprofen and 4-5 tabs of Tylenol twice daily for 3 months in an attempt to commit suicide.  She was experiencing auditory hallucinations of Satan's voice commanding her to commit suicide.  Upon admission, provisional discharge was not revoked.  She had stopped taking medications several weeks prior to admission.  Since admission, Seroquel XR, Effexor ER, Rozerem and Protriptyline were restarted.  A Lamictal titration was restarted but has been slow to progress given her inconsistent adherence.  PRNs of Klonopin, Seroquel, Hydroxyzine Zyprexa and Trazodone were initiated.  She began refusing medications on 2/23 and has been taking them inconsistently since.  She was spending nearly all of her time in her room and showed little motivation for treatment, so a room lock out was initiated as it has been during previous hospitalizations.  She continues to report auditory hallucinations of Satan instructing her to commit suicide, and to not take her medications.  She continues to report suicidal ideation.  Insight is poor.  She has been intermittently superficially scratching her forearms, and her diet order was changed to finger foods; staff are checking her room which is directly across from the desk to ensure she doesn't have plasticware or other objects with which to scratch herself.  On 3/25 she reported she had attempted to strangle herself with linens on 3/23 and 3/24, and linens were removed from her room.  A 72-hour hold was placed 2/24 after she requested discharge, and her provisional discharge was revoked.         Diagnoses:     Borderline personality disorder  Major depressive disorder,  recurrent, severe with psychotic features  Generalized anxiety disorder         Plan:     Medications:  Continue Lamictal 50 mg daily, due to be increased 4/9 if she remains adherent. Continue Rozerem 8 mg at HS.  Continue Seroquel  mg BID with a back up of IM Zyprexa available per Bhargav.  Continue Protriptyline 10 mg with dinner.  Continue PRNs of Klonopin, Seroquel, Hydroxyzine, Zyprexa and Trazodone.     Lock out of room from 9:30 AM - 12 PM, 1 PM - 3 PM and 5 PM to 9 PM.    No linens in room.  Order to be reviewed by provider daily.      She is under commitment MI with Bhargav (Zyprexa, Seroquel, Latuda, Abilify) in [facility].  Her provisional discharge was revoked.  She has had ECT in the past but had cognitive impairment with a MoCA score of 9/30 and improvements in sympotms noted only on treatment days.  Her mother will not allow her to return to her home.  Plan to pursue CADI funding and adult foster care placement, possibly at [facility].  CADI interview was completed 3/16.   She has outpatient psychiatry, therapy and case management.    Transfer care to Dr. Jones.      Attestation:  Patient has been seen and evaluated by me, WILLIAN Sánchez CNP  The patient was counseled on nature of illness and treatment plan/options  Care was coordinated with treatment team       Clinical Global Impressions  First:  Considering your total clinical experience with this particular patient population, how severe are the patient's symptoms at this time?: 7 (02/11/21 1842)  Compared to the patient's condition at the START of treatment, this patient's condition is: 4 (02/11/21 1842)  Most recent:  Considering your total clinical experience with this particular patient population, how severe are the patient's symptoms at this time?: 7 (03/25/21 0524)  Compared to the patient's condition at the START of treatment, this patient's condition is: 4 (03/25/21 0524)          Interim History:  "    The patient's care was discussed with the treatment team and chart notes were reviewed.  Pt was documented as sleeping throughout the overnight shift.  She has been spending some time reading in the milieu.  She spoke with her oldest son on the phone yesterday and states the conversation went well.  She feels tired today and reports having difficulty sleeping last night.  States her anxiety is \"pretty high.\"  Concentration is improving.  States urges to engage in self-injury by scratching herself and suicidal thoughts with a plan to strangle herself with linens are high and remains unable to contract for safety with linens.  Staff continue to implement room searches to ensure no contraband items including plasticware are present.  She is in the room across from the desk.  She is able to contract for safety with these safety measures in place.  She reports auditory hallucinations of Satan's voice, at the volume of a whisper but constant, making derogatory comments and urging her to not take medications and to commit suicide.           Medications:     Current Facility-Administered Medications   Medication     - MEDICATION INSTRUCTIONS -     acetaminophen (TYLENOL) tablet 650 mg     alum & mag hydroxide-simethicone (MAALOX) suspension 30 mL     clonazePAM (klonoPIN) tablet 0.5 mg     hydrOXYzine (ATARAX) tablet 25 mg     lamoTRIgine (LaMICtal) tablet 50 mg     QUEtiapine ER (SEROquel XR) 24 hr tablet 300 mg    Or     OLANZapine (zyPREXA) injection 10 mg     QUEtiapine ER (SEROquel XR) 24 hr tablet 300 mg    Or     OLANZapine (zyPREXA) injection 10 mg     OLANZapine (zyPREXA) tablet 10 mg    Or     OLANZapine (zyPREXA) injection 10 mg     polyethylene glycol (MIRALAX) Packet 17 g     protriptyline (VIVACTIL) tablet 10 mg     QUEtiapine (SEROquel) tablet 25 mg     ramelteon (ROZEREM) tablet 8 mg     rOPINIRole (REQUIP) tablet 1 mg     traZODone (DESYREL) tablet 50 mg     venlafaxine (EFFEXOR-XR) 24 hr capsule 225 mg " "            Allergies:   No Known Allergies         Psychiatric Examination:     /74 (BP Location: Left arm)   Pulse 93   Temp 98.3  F (36.8  C) (Oral)   Resp 16   Ht 1.651 m (5' 5\")   Wt 116.2 kg (256 lb 1.6 oz)   SpO2 98%   BMI 42.62 kg/m      Appearance:  Somnolent, adequate grooming/hygiene   Attitude:  cooperative   Eye Contact:  fair  Mood: anxious, depressed  Affect:  intensity is blunted  Speech:  low volume   Language: fluent and intact in English  Psychomotor, Gait, Musculoskeletal:  no evidence of tardive dyskinesia, dystonia, or tics  Thought Process:  goal oriented, linear, less than logical  Associations:  no loose associations  Thought Content:  endorses suicidal ideation and auditory hallucinations of Channing's voice instructing her to commit suicide, unable to contract for safety with linens in her room (plan to strangle selft), denies HI, denies visual hallucinations, endorses urges to cut self as a form of self-injury but not with suicidal intent  Insight:  limited  Judgement:  limited  Oriented to:  month/year, time, person, and place   Attention Span and Concentration:  fair  Recent and Remote Memory:  intact  Fund of Knowledge:  appropriate         Labs:     No results found for this or any previous visit (from the past 24 hour(s)).  "

## 2021-03-26 NOTE — PLAN OF CARE
Pt was withdrawn, isolative even when sitting in the lounge. She mostly reads a book, doesn't socialize with anyone and does not attend groups. She ate meals and was compliant with PO medications. Pt continues to have AH and thoughts of SI/SIB. Will continue to monitor.

## 2021-03-26 NOTE — PROGRESS NOTES
NOC Shift Report    Pt in bed at beginning of shift, breathing quiet and unlabored. Pt slept through shift. . No pt complaints or concerns at this time. No PRNs given. Will continue to monitor.

## 2021-03-27 PROCEDURE — 124N000002 HC R&B MH UMMC

## 2021-03-27 PROCEDURE — 250N000013 HC RX MED GY IP 250 OP 250 PS 637: Performed by: NURSE PRACTITIONER

## 2021-03-27 PROCEDURE — 250N000013 HC RX MED GY IP 250 OP 250 PS 637: Performed by: PSYCHIATRY & NEUROLOGY

## 2021-03-27 RX ADMIN — CLONAZEPAM 0.5 MG: 0.5 TABLET ORAL at 21:16

## 2021-03-27 RX ADMIN — ROPINIROLE HYDROCHLORIDE 1 MG: 1 TABLET, FILM COATED ORAL at 21:09

## 2021-03-27 RX ADMIN — POLYETHYLENE GLYCOL 3350 17 G: 17 POWDER, FOR SOLUTION ORAL at 08:37

## 2021-03-27 RX ADMIN — PROTRIPTYLINE HYDROCHLORIDE 10 MG: 10 TABLET ORAL at 18:34

## 2021-03-27 RX ADMIN — VENLAFAXINE HYDROCHLORIDE 225 MG: 150 CAPSULE, EXTENDED RELEASE ORAL at 08:37

## 2021-03-27 RX ADMIN — QUETIAPINE FUMARATE 300 MG: 300 TABLET, EXTENDED RELEASE ORAL at 21:09

## 2021-03-27 RX ADMIN — LAMOTRIGINE 50 MG: 25 TABLET ORAL at 08:37

## 2021-03-27 RX ADMIN — QUETIAPINE FUMARATE 300 MG: 300 TABLET, EXTENDED RELEASE ORAL at 08:37

## 2021-03-27 RX ADMIN — RAMELTEON 8 MG: 8 TABLET ORAL at 21:09

## 2021-03-27 ASSESSMENT — ACTIVITIES OF DAILY LIVING (ADL)
DRESS: INDEPENDENT
ORAL_HYGIENE: INDEPENDENT
LAUNDRY: UNABLE TO COMPLETE
HYGIENE/GROOMING: INDEPENDENT

## 2021-03-27 NOTE — PLAN OF CARE
Pt. willing to engage in a brief 1:1 with staff.  Pt. spent the shift in the lounge with her door locked per order.  Pt. after breakfast returned to her room with a plastic knife.  The pt. gave this staff the knife without incident.  Pt. has been with other pts, throughout the shift, watching a movie, and eating her meals with other pts.  The pt. seems comfortable overall. Pt. states that the auditory hallucination is a 5 out of 10 with 10 being high. Pt. currently denies suicidal ideation; however states that she still has self-injurious urges.  Pt. seems to be comfortable in the [facility] watching a movie with other pts.  Pt. exhibits no significant changes during this shift.

## 2021-03-27 NOTE — PROGRESS NOTES
Pt appears to be sleeping most of the shift. No issues noted/reported. Pt is on status 15 min checks. Will continue to monitor.

## 2021-03-27 NOTE — PROGRESS NOTES
"Pt cooperated with room lock-out from 6 pm to 9 pm.  Pt reports the voices are \"only whispers\" today.  When asked about SI and SIB she states she has urges to scratch but does not have anything to do it with.  She took her Vivactil at dinner.  She remained sitting in the lounge until 10 pm but refused to take her scheduled Seroquel.  She requests to take her medications \"Later\" but continues to be noncommittal when prompted.  Pt again remarked \"I'm not going to take it anyway.\"  IM Zyprexa given per [facility] order at 10 pm without incident.  Pt remains quiet and calmly sitting in bed at this time.   "

## 2021-03-28 PROCEDURE — 250N000013 HC RX MED GY IP 250 OP 250 PS 637: Performed by: NURSE PRACTITIONER

## 2021-03-28 PROCEDURE — 124N000002 HC R&B MH UMMC

## 2021-03-28 PROCEDURE — 250N000013 HC RX MED GY IP 250 OP 250 PS 637: Performed by: PSYCHIATRY & NEUROLOGY

## 2021-03-28 RX ADMIN — POLYETHYLENE GLYCOL 3350 17 G: 17 POWDER, FOR SOLUTION ORAL at 08:30

## 2021-03-28 RX ADMIN — QUETIAPINE FUMARATE 300 MG: 300 TABLET, EXTENDED RELEASE ORAL at 08:31

## 2021-03-28 RX ADMIN — VENLAFAXINE HYDROCHLORIDE 225 MG: 150 CAPSULE, EXTENDED RELEASE ORAL at 08:30

## 2021-03-28 RX ADMIN — QUETIAPINE FUMARATE 300 MG: 300 TABLET, EXTENDED RELEASE ORAL at 20:42

## 2021-03-28 RX ADMIN — PROTRIPTYLINE HYDROCHLORIDE 10 MG: 10 TABLET ORAL at 18:06

## 2021-03-28 RX ADMIN — ROPINIROLE HYDROCHLORIDE 1 MG: 1 TABLET, FILM COATED ORAL at 20:42

## 2021-03-28 RX ADMIN — RAMELTEON 8 MG: 8 TABLET ORAL at 20:42

## 2021-03-28 RX ADMIN — LAMOTRIGINE 50 MG: 25 TABLET ORAL at 08:31

## 2021-03-28 NOTE — PLAN OF CARE
"  Problem: Behavioral Health Plan of Care  Goal: Patient-Specific Goal (Individualization)  Outcome: No Change  Flowsheets (Taken 3/27/2021 2135)  Patient Vulnerabilities:   family/relationship conflict   history of unsuccessful treatment   poor impulse control  Patient Personal Strengths:   family/social support   no history of violence  Note: Shift Summery:  Pt reports auditory hallucinations are \"like whispers\" and continues to say she has urges to scratch herself with something.  \"I don't have anything though.\"  Pt denies active SI.  She has complied with being out of her room from 5:30 pm to 9 pm and took her scheduled Vivactil and Seroquel this evening.  Pt reports increased anxiety when she returned to her room.  PRN Clonazepam was given for anxiety.      Patient's Stated Goal for Shift:  \"I don't know.  I feel anxious.\"  Goal is to take her medications in reasonable time and to take PRN medications for anxiety.      Goal Status:  Met       "

## 2021-03-28 NOTE — PLAN OF CARE
Patient is isolative and flat today.  She is cooperative with room being locked at sits in [facility] area reading and watching television.  Patient is engaged when approached by staff, otherwise keeps to self.  Patient endorses suicidal ideation.  When asked about a plan, she said she would have a plan if there were linens in her room but she does not have access to linens so she feels safe.  She endorses passive urges of self injurious behavior by scratching self.  She does not have access to utensils and no SIB is observed today.  Patient showered this shift.  Patient is medication compliant.  Will continue to monitor.  Giulia Castelan RN

## 2021-03-29 PROCEDURE — 250N000013 HC RX MED GY IP 250 OP 250 PS 637: Performed by: PSYCHIATRY & NEUROLOGY

## 2021-03-29 PROCEDURE — 250N000011 HC RX IP 250 OP 636: Performed by: NURSE PRACTITIONER

## 2021-03-29 PROCEDURE — 87635 SARS-COV-2 COVID-19 AMP PRB: CPT | Performed by: PSYCHIATRY & NEUROLOGY

## 2021-03-29 PROCEDURE — 99232 SBSQ HOSP IP/OBS MODERATE 35: CPT | Performed by: PSYCHIATRY & NEUROLOGY

## 2021-03-29 PROCEDURE — 250N000013 HC RX MED GY IP 250 OP 250 PS 637: Performed by: NURSE PRACTITIONER

## 2021-03-29 PROCEDURE — 124N000002 HC R&B MH UMMC

## 2021-03-29 RX ADMIN — VENLAFAXINE HYDROCHLORIDE 225 MG: 150 CAPSULE, EXTENDED RELEASE ORAL at 09:04

## 2021-03-29 RX ADMIN — POLYETHYLENE GLYCOL 3350 17 G: 17 POWDER, FOR SOLUTION ORAL at 09:05

## 2021-03-29 RX ADMIN — QUETIAPINE FUMARATE 300 MG: 300 TABLET, EXTENDED RELEASE ORAL at 09:04

## 2021-03-29 RX ADMIN — OLANZAPINE 10 MG: 10 INJECTION, POWDER, FOR SOLUTION INTRAMUSCULAR at 22:28

## 2021-03-29 RX ADMIN — LAMOTRIGINE 50 MG: 25 TABLET ORAL at 09:04

## 2021-03-29 RX ADMIN — PROTRIPTYLINE HYDROCHLORIDE 10 MG: 10 TABLET ORAL at 18:19

## 2021-03-29 ASSESSMENT — ACTIVITIES OF DAILY LIVING (ADL)
LAUNDRY: WITH SUPERVISION
DRESS: INDEPENDENT
HYGIENE/GROOMING: INDEPENDENT
ORAL_HYGIENE: INDEPENDENT
DRESS: INDEPENDENT
ORAL_HYGIENE: INDEPENDENT
LAUNDRY: WITH SUPERVISION
HYGIENE/GROOMING: INDEPENDENT

## 2021-03-29 NOTE — PLAN OF CARE
Pt. willing to engage in a 1:1 with staff.  Pt. states that the auditory hallucination have diminished.   Pt. states that she feels safe on the unit, denies suicidal ideation.  Pt. has some self-injurious thoughts but is not acting  on them now.

## 2021-03-30 PROCEDURE — 250N000013 HC RX MED GY IP 250 OP 250 PS 637: Performed by: PSYCHIATRY & NEUROLOGY

## 2021-03-30 PROCEDURE — 99232 SBSQ HOSP IP/OBS MODERATE 35: CPT | Performed by: PSYCHIATRY & NEUROLOGY

## 2021-03-30 PROCEDURE — 250N000013 HC RX MED GY IP 250 OP 250 PS 637: Performed by: NURSE PRACTITIONER

## 2021-03-30 PROCEDURE — 250N000011 HC RX IP 250 OP 636: Performed by: NURSE PRACTITIONER

## 2021-03-30 PROCEDURE — 124N000002 HC R&B MH UMMC

## 2021-03-30 RX ADMIN — CLONAZEPAM 0.5 MG: 0.5 TABLET ORAL at 13:02

## 2021-03-30 RX ADMIN — QUETIAPINE FUMARATE 300 MG: 300 TABLET, EXTENDED RELEASE ORAL at 08:08

## 2021-03-30 RX ADMIN — LAMOTRIGINE 50 MG: 25 TABLET ORAL at 08:08

## 2021-03-30 RX ADMIN — PROTRIPTYLINE HYDROCHLORIDE 10 MG: 10 TABLET ORAL at 18:19

## 2021-03-30 RX ADMIN — OLANZAPINE 10 MG: 10 INJECTION, POWDER, FOR SOLUTION INTRAMUSCULAR at 22:34

## 2021-03-30 RX ADMIN — POLYETHYLENE GLYCOL 3350 17 G: 17 POWDER, FOR SOLUTION ORAL at 08:08

## 2021-03-30 RX ADMIN — VENLAFAXINE HYDROCHLORIDE 225 MG: 150 CAPSULE, EXTENDED RELEASE ORAL at 08:08

## 2021-03-30 ASSESSMENT — ACTIVITIES OF DAILY LIVING (ADL)
HYGIENE/GROOMING: INDEPENDENT
DRESS: INDEPENDENT
LAUNDRY: WITH SUPERVISION
HYGIENE/GROOMING: INDEPENDENT
ORAL_HYGIENE: INDEPENDENT
ORAL_HYGIENE: INDEPENDENT
DRESS: INDEPENDENT
LAUNDRY: WITH SUPERVISION

## 2021-03-30 NOTE — PROGRESS NOTES
[facility],  Psychiatric Progress Note      Impression:     Misty Parham is a 35-year-old female admitted to [facility] on 2/10/2021.  She was admitted under ongoing civil commitment MI with Bhargav in [facility] after taking 4-5 tabs of Unisom, 4-5 tabs of Ibuprofen and 4-5 tabs of Tylenol twice daily for 3 months in an attempt to commit suicide.  She was experiencing auditory hallucinations of Satan's voice commanding her to commit suicide.  Upon admission, provisional discharge was not revoked.  She had stopped taking medications several weeks prior to admission.  Since admission, Seroquel XR, Effexor ER, Rozerem and Protriptyline were restarted.  A Lamictal titration was restarted but has been slow to progress given her inconsistent adherence.  PRNs of Klonopin, Seroquel, Hydroxyzine Zyprexa and Trazodone were initiated.  She began refusing medications on 2/23 and has been taking them inconsistently since.  She was spending nearly all of her time in her room and showed little motivation for treatment, so a room lock out was initiated as it has been during previous hospitalizations.  She continues to report auditory hallucinations of Satan instructing her to commit suicide, and to not take her medications.  She continues to report suicidal ideation.  Insight is poor.  She has been intermittently superficially scratching her forearms, and her diet order was changed to finger foods; staff are checking her room which is directly across from the desk to ensure she doesn't have plasticware or other objects with which to scratch herself.  On 3/25 she reported she had attempted to strangle herself with linens on 3/23 and 3/24, and linens were removed from her room.  A 72-hour hold was placed 2/24 after she requested discharge, and her provisional discharge was revoked.         Diagnoses:     Borderline personality disorder  Major depressive disorder,  recurrent, severe with psychotic features  Generalized anxiety disorder         Plan:     Medications:  Continue Lamictal 50 mg daily, due to be increased 4/9 if she remains adherent. Continue Rozerem 8 mg at HS.  Continue Seroquel  mg BID with a back up of IM Zyprexa available per Bhargav.  Continue Protriptyline 10 mg with dinner.  Continue PRNs of Klonopin, Seroquel, Hydroxyzine, Zyprexa and Trazodone. No medication changes today.     Lock out of room from 9:30 AM - 12 PM, 1 PM - 3 PM and 5 PM to 9 PM.    No linens in room.  Order to be reviewed by provider daily. Still can't contract for safety and order stays put.     She is under commitment MI with Bhargav (Zyprexa, Seroquel, Latuda, Abilify) in [facility].  Her provisional discharge was revoked.  She has had ECT in the past but had cognitive impairment with a MoCA score of 9/30 and improvements in sympotms noted only on treatment days.  Her mother will not allow her to return to her home.  Plan to pursue CAD funding and adult foster care placement, possibly at [facility]. She will have an interview with [facility] tomorrow. CAD interview was completed 3/16.   She has outpatient psychiatry, therapy and case management. Will continue to provide support and structure, encourage better group attendance.    Attestation:  Patient has been seen and evaluated by me, Jd Jones MD  The patient was counseled on nature of illness and treatment plan/options  Care was coordinated with treatment team       Clinical Global Impressions  First:  Considering your total clinical experience with this particular patient population, how severe are the patient's symptoms at this time?: 7 (02/11/21 1842)  Compared to the patient's condition at the START of treatment, this patient's condition is: 4 (02/11/21 1842)  Most recent:  Considering your total clinical experience with this particular patient population, how severe are the patient's symptoms at  "this time?: 7 (03/25/21 0524)  Compared to the patient's condition at the START of treatment, this patient's condition is: 4 (03/25/21 0524)          Interim History:     The patient's care was discussed with the treatment team and chart notes were reviewed.  Pt was documented as sleeping throughout the overnight shift. Reported still feeling depressed and having Suicidal ideation, having Auditory hallucinations off and on. Admitted to having thoughts of Self injurious behavior, but was able to resist them. Takes meds consistently. Continues not to have linens in her room after trying to strangulate self with them. Continues to report Auditory hallucinations, but subjectively doesn't appear to be responding to internal stimuli.     Was seen in presence of PA student. Presented as very depressed, with flat, sad affect. Said today that she was glad that her kids would leave with grandma and dad and go to [facility] where they would attend private school. Otherwise, said that she didn't see significant changes in her mood and outlook. Reported that she still could not contract for safety, was unwilling to go to groups. Passively accepted that she would have to go to a residential setting instead of her own or family place, but admitted that she would not make efforts to get better to be admitted: \"I don't care\". Overall, presented as very sad and negative. Was tested for Covid 19 yesterday, was negative.         Medications:     Current Facility-Administered Medications   Medication     - MEDICATION INSTRUCTIONS -     acetaminophen (TYLENOL) tablet 650 mg     alum & mag hydroxide-simethicone (MAALOX) suspension 30 mL     clonazePAM (klonoPIN) tablet 0.5 mg     hydrOXYzine (ATARAX) tablet 25 mg     lamoTRIgine (LaMICtal) tablet 50 mg     QUEtiapine ER (SEROquel XR) 24 hr tablet 300 mg    Or     OLANZapine (zyPREXA) injection 10 mg     QUEtiapine ER (SEROquel XR) 24 hr tablet 300 mg    Or     OLANZapine (zyPREXA) injection " "10 mg     OLANZapine (zyPREXA) tablet 10 mg    Or     OLANZapine (zyPREXA) injection 10 mg     polyethylene glycol (MIRALAX) Packet 17 g     protriptyline (VIVACTIL) tablet 10 mg     QUEtiapine (SEROquel) tablet 25 mg     ramelteon (ROZEREM) tablet 8 mg     rOPINIRole (REQUIP) tablet 1 mg     traZODone (DESYREL) tablet 50 mg     venlafaxine (EFFEXOR-XR) 24 hr capsule 225 mg             Allergies:   No Known Allergies         Psychiatric Examination:     /81 (BP Location: Right arm)   Pulse 98   Temp 97.8  F (36.6  C) (Tympanic)   Resp 16   Ht 1.651 m (5' 5\")   Wt 116.2 kg (256 lb 1.6 oz)   SpO2 99%   BMI 42.62 kg/m       Orthostatic Vitals       Most Recent      Sitting Orthostatic /83 03/29 0854    Sitting Orthostatic Pulse (bpm) 101 03/29 0854    Standing Orthostatic /84 03/30 0832    Standing Orthostatic Pulse (bpm) 102 03/30 0832         Appearance:  Somnolent, adequate grooming/hygiene   Attitude: somewhat cooperative   Eye Contact:  fair  Mood: anxious, depressed  Affect:  intensity is blunted  Speech:  low volume, soft, monotonous   Language: fluent and intact in English  Psychomotor, Gait, Musculoskeletal:  no evidence of tardive dyskinesia, dystonia, or tics  Thought Process:  goal oriented, linear, less than logical  Associations:  no loose associations  Thought Content:  endorses suicidal ideation and auditory hallucinations of Channing's voice instructing her to commit suicide, unable to contract for safety with linens in her room (plan to strangle selft), denies HI, denies visual hallucinations, endorses urges to cut self as a form of self-injury but not with suicidal intent  Insight:  limited  Judgement:  limited  Oriented to:  month/year, time, person, and place   Attention Span and Concentration:  fair  Recent and Remote Memory:  intact  Fund of Knowledge:  appropriate         Labs:     No results found for this or any previous visit (from the past 24 hour(s)).  "

## 2021-03-30 NOTE — PLAN OF CARE
"Patient cooperative with out of room order this shift and is eating 100%. Patient declines groups, has a flat affect, and when in lounge does not socialize with peers (reads books). Patient replies included \"I do not like any TV shows\", \"I don't care where I go from here\" and \"it doesn't matter.\" Patient appears anxious and wrings her hands when conversing (1300 offered/accepted PRN klonopin and reported \"it helps\"). Patient was med-compliant this shift (with encouragement). Patient endorses auditory hallucinations reporting \"the are just whispers now.\"    Patient endorses chronic SI with a plan to \"hang self with sheets which is why I do not have any.\" Weighted blanket in use in patient room.    Patient endorses chronic SIB urges \"to cut myself.\"    /81 (BP Location: Right arm)   Pulse 98   Temp 97.8  F (36.6  C) (Tympanic)   Resp 16   Ht 1.651 m (5' 5\")   Wt 116.2 kg (256 lb 1.6 oz)   SpO2 99%   BMI 42.62 kg/m  . No c/o pain.       03/30/21 1200   Sleep/Rest/Relaxation   Sleep/Rest/Relaxation (WDL) WDL   Number of hours napping 2 hours   Coping/Psychosocial   Verbalized Emotional State anxiety;depression;hopelessness;sadness;suicidal thoughts   Plan of Care Reviewed With patient   Patient Agreement with Plan of Care other (see comments)(\"do not care\")   Trust Relationship/Rapport care explained;emotional support provided;choices provided;questions encouraged   Coping/Psychosocial Interventions   Supportive Measures active listening utilized   Psycho Education   Type of Intervention 1:1 intervention   Response participates with encouragement   Hours 0.5   Treatment Detail check in   Cognitive/Neuro/Behavioral WDL   Cognitive/Neuro/Behavioral WDL X;mood/behavior   Mood/Behavior anxious;flat affect;sad   Behavioral Health   Thoughts/Cognition (WDL) ex   Hallucinations auditory   Thinking poor concentration   Insight poor   Judgement impaired   Affect/Mood (WDL) ex   Affect blunted, flat;sad   Mood " depressed;hopeless;anxious   ADL Assessment (WDL) WDL   Suicidality (WDL) ex   Suicidality chronic thoughts with no stated plan   1. Wish to be Dead (Recent) Yes   2. Non-Specific Active Suicidal Thoughts (Recent) No   3. Active Suicidal Ideation with any Methods (Not Plan) Without Intent to Act (Recent) No   4. Active Suicidal Ideation with Some Intent to Act, Without Specific Plan (Recent) No   5. Active Suicidal Ideation with Specific Plan and Intent (Recent) Yes   Duration (Lifetime) 5   Change in Protective Factors? No   Enviromental Risk Factors None   Self Injury (WDL) Ex   Self Injury chronic thoughts with no stated plan   Elopement (WDL) WDL   Activity (WDL) Ex   Activity withdrawn   Speech (WDL) WDL   Medication Sensitivity (WDL) WDL   Psychomotor Gait (WDL) WDL   Overt Agression (WDL) WDL   Behavior WDL   Interactions avoids social contact;eye contact intermittent   Emotion Mood WDL   Emotion/Mood/Affect WDL X   Affect blunted   Emotion/Mood anxious;depressed;hopeless;sad   Perceptual State WDL   Perceptual State WDL X;hallucinations   Hallucinations auditory;command   Thought Process WDL   Judgment and Insight judgment not appropriate to situation   Thought Content hopelessness;suicidal thoughts;worthlessness   Skin   Skin WDL WDL   Niko Risk Assessment   Sensory Perception 4-->no impairment   Moisture 4-->rarely moist   Activity 4-->walks frequently   Mobility 4-->no limitation   Nutrition 4-->excellent   Friction and Shear 3-->no apparent problem   Niko Score 23   Functional Screen (every 3 days/change)   Ambulation 0 - independent   Transferring 0 - independent   Toileting 0 - independent   Bathing 0 - independent   Dressing 0 - independent   Eating 0 - independent   Communication 0 - understands/communicates without difficulty   Swallowing 0 - swallows foods/liquids without difficulty   Nutrition   Intake (%) 100%   Safety   Patient Location lounge   Observed Behavior calm   Observed Behavior  (Comment) withdrawn, anxious, sad   Safety Measures suicide assessment completed;safety rounds completed;environmental rounds completed   Diversional Activity television;reading   De-Escalation Techniques diversional activity encouraged;medication administered;medication offered   Safety   Suicidality Status 15   Assault private room   Fall Assessment   Get up and Go Test 0 - pushes up, successful in 1 attempt   Fall Risk Interventions   Within arms reach in use No   Fall precaution band in place? No (see comments)   C-SSRS (Frequent Screener)   Q2 Suicidal Thoughts (Since Last Contact) yes   Q3 Have you been thinking about how you might do this? yes   Q4 Suicidal Intent without Specific Plan no   Q5 Suicide Intent with Specific Plan yes   Q6 Suicide Behavior no   Violence Risk   Feels Like Hurting Others no   Previous Attempt to Harm Others no   Daily Care   Activity (Behavioral Health) up ad priya   Activities of Daily Living   Hygiene/Grooming independent   Oral Hygiene independent   Dress independent   Laundry with supervision   Room Organization independent   Activity   Activity Assistance Provided independent   Hygiene Care Assistance   Hygiene Assistance independent     Nursing will continue to monitor.

## 2021-03-30 NOTE — PLAN OF CARE
Pt slept 7.5 hours and appeared to sleep through the night. No safety issues or concerns reported. Will continue to monitor.

## 2021-03-30 NOTE — PROGRESS NOTES
[facility],  Psychiatric Progress Note      Impression:     Misty Parham is a 35-year-old female admitted to [facility] on 2/10/2021.  She was admitted under ongoing civil commitment MI with Bhargav in [facility] after taking 4-5 tabs of Unisom, 4-5 tabs of Ibuprofen and 4-5 tabs of Tylenol twice daily for 3 months in an attempt to commit suicide.  She was experiencing auditory hallucinations of Satan's voice commanding her to commit suicide.  Upon admission, provisional discharge was not revoked.  She had stopped taking medications several weeks prior to admission.  Since admission, Seroquel XR, Effexor ER, Rozerem and Protriptyline were restarted.  A Lamictal titration was restarted but has been slow to progress given her inconsistent adherence.  PRNs of Klonopin, Seroquel, Hydroxyzine Zyprexa and Trazodone were initiated.  She began refusing medications on 2/23 and has been taking them inconsistently since.  She was spending nearly all of her time in her room and showed little motivation for treatment, so a room lock out was initiated as it has been during previous hospitalizations.  She continues to report auditory hallucinations of Satan instructing her to commit suicide, and to not take her medications.  She continues to report suicidal ideation.  Insight is poor.  She has been intermittently superficially scratching her forearms, and her diet order was changed to finger foods; staff are checking her room which is directly across from the desk to ensure she doesn't have plasticware or other objects with which to scratch herself.  On 3/25 she reported she had attempted to strangle herself with linens on 3/23 and 3/24, and linens were removed from her room.  A 72-hour hold was placed 2/24 after she requested discharge, and her provisional discharge was revoked.         Diagnoses:     Borderline personality disorder  Major depressive disorder,  recurrent, severe with psychotic features  Generalized anxiety disorder         Plan:     Medications:  Continue Lamictal 50 mg daily, due to be increased 4/9 if she remains adherent. Continue Rozerem 8 mg at HS.  Continue Seroquel  mg BID with a back up of IM Zyprexa available per Bhargav.  Continue Protriptyline 10 mg with dinner.  Continue PRNs of Klonopin, Seroquel, Hydroxyzine, Zyprexa and Trazodone.     Lock out of room from 9:30 AM - 12 PM, 1 PM - 3 PM and 5 PM to 9 PM.    No linens in room.  Order to be reviewed by provider daily.      She is under commitment MI with Bhargav (Zyprexa, Seroquel, Latuda, Abilify) in [facility].  Her provisional discharge was revoked.  She has had ECT in the past but had cognitive impairment with a MoCA score of 9/30 and improvements in sympotms noted only on treatment days.  Her mother will not allow her to return to her home.  Plan to pursue CADI funding and adult foster care placement, possibly at [facility].  CADI interview was completed 3/16.   She has outpatient psychiatry, therapy and case management.    Attestation:  Patient has been seen and evaluated by me, Jd Jones MD  The patient was counseled on nature of illness and treatment plan/options  Care was coordinated with treatment team       Clinical Global Impressions  First:  Considering your total clinical experience with this particular patient population, how severe are the patient's symptoms at this time?: 7 (02/11/21 1842)  Compared to the patient's condition at the START of treatment, this patient's condition is: 4 (02/11/21 1842)  Most recent:  Considering your total clinical experience with this particular patient population, how severe are the patient's symptoms at this time?: 7 (03/25/21 0524)  Compared to the patient's condition at the START of treatment, this patient's condition is: 4 (03/25/21 0524)          Interim History:     The patient's care was discussed with the  treatment team and chart notes were reviewed.  Pt was documented as sleeping throughout the overnight shift. Reported still feeling depressed and having Suicidal ideation, having Auditory hallucinations off and on. Admitted to having thoughts of Self injurious behavior, but was able to resist them. Takes meds consistently. Continues not to have linens in her room after trying to strangulate self with them.    Was seen in presence of PA student. Presented as very depressed, with flat, sad affect. Said that she was tired from fighting and had nothing left to live. Reported abovementioned auditory hallucinations of voice of Satan and others, typically, derogatory, telling her to commit suicide. Said that she talked to her family. Her mother in law who is visiting here from [facility] will take both Misty's kids there where they will be studying in private school. Misty has mixed feelings about her kids departure. Overall, presented as very sad and negative. Was tested for Covid 19 today, was negative.         Medications:     Current Facility-Administered Medications   Medication     - MEDICATION INSTRUCTIONS -     acetaminophen (TYLENOL) tablet 650 mg     alum & mag hydroxide-simethicone (MAALOX) suspension 30 mL     clonazePAM (klonoPIN) tablet 0.5 mg     hydrOXYzine (ATARAX) tablet 25 mg     lamoTRIgine (LaMICtal) tablet 50 mg     QUEtiapine ER (SEROquel XR) 24 hr tablet 300 mg    Or     OLANZapine (zyPREXA) injection 10 mg     QUEtiapine ER (SEROquel XR) 24 hr tablet 300 mg    Or     OLANZapine (zyPREXA) injection 10 mg     OLANZapine (zyPREXA) tablet 10 mg    Or     OLANZapine (zyPREXA) injection 10 mg     polyethylene glycol (MIRALAX) Packet 17 g     protriptyline (VIVACTIL) tablet 10 mg     QUEtiapine (SEROquel) tablet 25 mg     ramelteon (ROZEREM) tablet 8 mg     rOPINIRole (REQUIP) tablet 1 mg     traZODone (DESYREL) tablet 50 mg     venlafaxine (EFFEXOR-XR) 24 hr capsule 225 mg             Allergies:   No Known  "Allergies         Psychiatric Examination:     BP 95/71 (BP Location: Right arm)   Pulse 109   Temp 98.1  F (36.7  C) (Tympanic)   Resp 16   Ht 1.651 m (5' 5\")   Wt 116.2 kg (256 lb 1.6 oz)   SpO2 97%   BMI 42.62 kg/m       Orthostatic Vitals       Most Recent      Sitting Orthostatic /83 03/29 0854    Sitting Orthostatic Pulse (bpm) 101 03/29 0854    Standing Orthostatic /57 03/29 0854    Standing Orthostatic Pulse (bpm) 119 03/29 0854           Appearance:  Somnolent, adequate grooming/hygiene   Attitude: somewhat cooperative   Eye Contact:  fair  Mood: anxious, depressed  Affect:  intensity is blunted  Speech:  low volume   Language: fluent and intact in English  Psychomotor, Gait, Musculoskeletal:  no evidence of tardive dyskinesia, dystonia, or tics  Thought Process:  goal oriented, linear, less than logical  Associations:  no loose associations  Thought Content:  endorses suicidal ideation and auditory hallucinations of Channing's voice instructing her to commit suicide, unable to contract for safety with linens in her room (plan to strangle selft), denies HI, denies visual hallucinations, endorses urges to cut self as a form of self-injury but not with suicidal intent  Insight:  limited  Judgement:  limited  Oriented to:  month/year, time, person, and place   Attention Span and Concentration:  fair  Recent and Remote Memory:  intact  Fund of Knowledge:  appropriate         Labs:     Recent Results (from the past 24 hour(s))   Asymptomatic SARS-CoV-2 COVID-19 Virus (Coronavirus) by PCR    Collection Time: 03/29/21  1:28 PM    Specimen: Nasopharyngeal   Result Value Ref Range    SARS-CoV-2 Virus Specimen Source Nasopharyngeal     SARS-CoV-2 PCR Result NEGATIVE     SARS-CoV-2 PCR Comment (Note)      "

## 2021-03-30 NOTE — PLAN OF CARE
"Patient room search yielded 1 plastic spoon, broken, placed above the bathroom light. Patient stated intention \"to cut myself\" (endorses chronic SIB urges) and no new injuries observed. Patient refused scheduled HS medication opting for IM Zyprexa.    Patient is hopeless, flat, and withdrawn declining groups coming out room only when for meals (eating 100). Patient is locked out of room  for prescribed periods (with encouragement). Patient reports \"auditory hallucinations are a whisper-I have always been depressed and anxious.\"    Patient endorses chronic SI/SIB.    Nursing will continue to monitor.  "

## 2021-03-30 NOTE — PLAN OF CARE
Pt was visible in milieu but was minimally social with peers, stayed on periphery of lounge watching tv. Affect was blunted, flat. Mood was depressed. Pt was compliant with being locked out of her room and with taking her scheduled medications. Pt endorses AH and SI/SIB thoughts only. Will continue to monitor.

## 2021-03-31 PROCEDURE — 99232 SBSQ HOSP IP/OBS MODERATE 35: CPT | Performed by: PSYCHIATRY & NEUROLOGY

## 2021-03-31 PROCEDURE — 250N000013 HC RX MED GY IP 250 OP 250 PS 637: Performed by: PSYCHIATRY & NEUROLOGY

## 2021-03-31 PROCEDURE — 124N000002 HC R&B MH UMMC

## 2021-03-31 PROCEDURE — 250N000013 HC RX MED GY IP 250 OP 250 PS 637: Performed by: NURSE PRACTITIONER

## 2021-03-31 RX ORDER — BUPROPION HYDROCHLORIDE 150 MG/1
150 TABLET ORAL DAILY
Status: DISCONTINUED | OUTPATIENT
Start: 2021-03-31 | End: 2021-04-02

## 2021-03-31 RX ORDER — PROTRIPTYLINE HYDROCHLORIDE 10 MG/1
10 TABLET, FILM COATED ORAL AT BEDTIME
Status: DISCONTINUED | OUTPATIENT
Start: 2021-03-31 | End: 2021-04-07 | Stop reason: HOSPADM

## 2021-03-31 RX ORDER — QUETIAPINE 300 MG/1
300 TABLET, FILM COATED, EXTENDED RELEASE ORAL AT BEDTIME
Status: DISCONTINUED | OUTPATIENT
Start: 2021-03-31 | End: 2021-04-07 | Stop reason: HOSPADM

## 2021-03-31 RX ORDER — OLANZAPINE 10 MG/2ML
10 INJECTION, POWDER, FOR SOLUTION INTRAMUSCULAR AT BEDTIME
Status: DISCONTINUED | OUTPATIENT
Start: 2021-03-31 | End: 2021-04-07 | Stop reason: HOSPADM

## 2021-03-31 RX ADMIN — CLONAZEPAM 0.5 MG: 0.5 TABLET ORAL at 21:31

## 2021-03-31 RX ADMIN — RAMELTEON 8 MG: 8 TABLET ORAL at 21:31

## 2021-03-31 RX ADMIN — QUETIAPINE FUMARATE 300 MG: 300 TABLET, EXTENDED RELEASE ORAL at 21:31

## 2021-03-31 RX ADMIN — BUPROPION HYDROCHLORIDE 150 MG: 150 TABLET, EXTENDED RELEASE ORAL at 16:16

## 2021-03-31 RX ADMIN — PROTRIPTYLINE HYDROCHLORIDE 10 MG: 10 TABLET ORAL at 21:32

## 2021-03-31 RX ADMIN — QUETIAPINE FUMARATE 300 MG: 300 TABLET, EXTENDED RELEASE ORAL at 08:21

## 2021-03-31 RX ADMIN — POLYETHYLENE GLYCOL 3350 17 G: 17 POWDER, FOR SOLUTION ORAL at 08:21

## 2021-03-31 RX ADMIN — VENLAFAXINE HYDROCHLORIDE 225 MG: 150 CAPSULE, EXTENDED RELEASE ORAL at 08:21

## 2021-03-31 RX ADMIN — LAMOTRIGINE 50 MG: 25 TABLET ORAL at 08:21

## 2021-03-31 RX ADMIN — ROPINIROLE HYDROCHLORIDE 1 MG: 1 TABLET, FILM COATED ORAL at 21:31

## 2021-03-31 ASSESSMENT — ACTIVITIES OF DAILY LIVING (ADL)
HYGIENE/GROOMING: INDEPENDENT
DRESS: INDEPENDENT
ORAL_HYGIENE: INDEPENDENT
DRESS: INDEPENDENT
LAUNDRY: WITH SUPERVISION
ORAL_HYGIENE: INDEPENDENT
HYGIENE/GROOMING: INDEPENDENT
LAUNDRY: WITH SUPERVISION

## 2021-03-31 NOTE — PROGRESS NOTES
[facility],  Psychiatric Progress Note      Impression:     Misty Parham is a 35-year-old female admitted to [facility] on 2/10/2021.  She was admitted under ongoing civil commitment MI with [facility]arg[facility] in [facility] after taking 4-5 tabs of Unisom, 4-5 tabs of Ibuprofen and 4-5 tabs of Tylenol twice daily for 3 months in an attempt to commit suicide.  She was experiencing auditory hallucinations of Satan's voice commanding her to commit suicide.  Upon admission, provisional discharge was not revoked.  She had stopped taking medications several weeks prior to admission.  Since admission, Seroquel XR, Effexor ER, Rozerem and Protriptyline were restarted.  A Lamictal titration was restarted but has been slow to progress given her inconsistent adherence.  PRNs of Klonopin, Seroquel, Hydroxyzine Zyprexa and Trazodone were initiated.  She began refusing medications on 2/23 and has been taking them inconsistently since.  She was spending nearly all of her time in her room and showed little motivation for treatment, so a room lock out was initiated as it has been during previous hospitalizations.  She continues to report auditory hallucinations of Satan instructing her to commit suicide, and to not take her medications.  She continues to report suicidal ideation.  Insight is poor.  She has been intermittently superficially scratching her forearms, and her diet order was changed to finger foods; staff are checking her room which is directly across from the desk to ensure she doesn't have plasticware or other objects with which to scratch herself.  On 3/25 she reported she had attempted to strangle herself with linens on 3/23 and 3/24, and linens were removed from her room.  A 72-hour hold was placed 2/24 after she requested discharge, and her provisional discharge was revoked. She is committed now.         Diagnoses:     Borderline personality disorder  Major  depressive disorder, recurrent, severe with psychotic features  Generalized anxiety disorder         Plan:     Medications: Continue Lamictal 50 mg daily, due to be increased 4/9 if she remains adherent. Continue Rozerem 8 mg at HS.  Continue Seroquel  mg BID with a back up of IM Zyprexa available per Bhargav.  Continue Protriptyline 10 mg with dinner.  Continue PRNs of Klonopin, Seroquel, Hydroxyzine, Zyprexa and Trazodone. Add Wellbutrin  mg daily qam.     Lock out of room from 9:30 AM - 12 PM, 1 PM - 3 PM and 5 PM to 9 PM.    No linens in room.  Order to be reviewed by provider daily. Still cannot contract for safety and order stays put.     She is under commitment MI with Bhargav (Zyprexa, Seroquel, Latuda, Abilify) in [facility].  Her provisional discharge was revoked.  She has had ECT in the past but had cognitive impairment with a MoCA score of 9/30 and improvements in sympotms noted only on treatment days.  Her mother will not allow her to return to her home.  Plan to pursue [facility] funding and adult foster care placement, possibly at [facility]. She will have an interview with [facility] 4/1/21. [facility] interview was completed 3/16.   She has outpatient psychiatry, therapy and case management. Will continue to provide support and structure, encourage better group attendance and goal settings.    Attestation:  Patient has been seen and evaluated by me, Jd Jones MD  [facility] Psychiatry: The patient was counseled on nature of illness and treatment plan/options  Care was coordinated with treatment team       Clinical Global Impressions  First:  Considering your total clinical experience with this particular patient population, how severe are the patient's symptoms at this time?: 7 (02/11/21 1842)  Compared to the patient's condition at the START of treatment, this patient's condition is: 4 (02/11/21 1842)  Most recent:  Considering your total clinical experience with  "this particular patient population, how severe are the patient's symptoms at this time?: 7 (03/25/21 0524)  Compared to the patient's condition at the START of treatment, this patient's condition is: 4 (03/25/21 0524)          Interim History:     The patient's care was discussed with the treatment team and chart notes were reviewed.  Pt was documented as sleeping throughout the overnight shift, approximately 7.5 hours. She continues to feel depressed with suicidal ideation and auditory hallucinations off and on. Staff reported finding a plastic spoon in her bathroom despite not being allowed to have any plastic silverware. She continues to have thoughts of self-injurious behavior and is not allowed linens in her room after trying to strangulate herself with them. She refused oral medications last night, though took them this morning. Continues to report auditory hallucinations, but subjectively does not appear to be responding to internal stimuli. She has remained withdrawn and hopeless, has not been attending any groups.     Was seen in presence of PA student. Presented as very depressed, with flat, sad affect. Maintains she has nothing to live for and does not feel there has been any significant change in her mood and outlook. Still unable to contract for safety, stating she is \"just taking up space here.\" She is adamant that she will not make efforts to get better and would attempt suicide if she was discharged. Presents again very sad and negative without desire or motivation to improve. When reminded about her 2 children, said she didn't care how she would do if she did commit suicide: \"they would do fine\". Remains unwilling to go to groups and reports feeling tired. Discussed medications she has previously tried for depression, and the patient is willing to start on Wellbutrin which she has taken in the past. We discussed the need to take medications consistently and she reported not taking her night " "medications last night because she could not take them when she wanted to at 11:00 PM.          Medications:     Current Facility-Administered Medications   Medication     - MEDICATION INSTRUCTIONS -     acetaminophen (TYLENOL) tablet 650 mg     alum & mag hydroxide-simethicone (MAALOX) suspension 30 mL     clonazePAM (klonoPIN) tablet 0.5 mg     hydrOXYzine (ATARAX) tablet 25 mg     lamoTRIgine (LaMICtal) tablet 50 mg     QUEtiapine ER (SEROquel XR) 24 hr tablet 300 mg    Or     OLANZapine (zyPREXA) injection 10 mg     QUEtiapine ER (SEROquel XR) 24 hr tablet 300 mg    Or     OLANZapine (zyPREXA) injection 10 mg     OLANZapine (zyPREXA) tablet 10 mg    Or     OLANZapine (zyPREXA) injection 10 mg     polyethylene glycol (MIRALAX) Packet 17 g     protriptyline (VIVACTIL) tablet 10 mg     QUEtiapine (SEROquel) tablet 25 mg     ramelteon (ROZEREM) tablet 8 mg     rOPINIRole (REQUIP) tablet 1 mg     traZODone (DESYREL) tablet 50 mg     venlafaxine (EFFEXOR-XR) 24 hr capsule 225 mg             Allergies:   No Known Allergies         Psychiatric Examination:     /86 (BP Location: Right arm)   Pulse 102   Temp 98  F (36.7  C)   Resp 16   Ht 1.651 m (5' 5\")   Wt 116.2 kg (256 lb 1.6 oz)   SpO2 99%   BMI 42.62 kg/m       Orthostatic Vitals       Most Recent      Standing Orthostatic /84 03/30 0832    Standing Orthostatic Pulse (bpm) 102 03/30 0832         Appearance:  Somnolent, adequate grooming/hygiene   Attitude: somewhat cooperative   Eye Contact:  fair  Mood: anxious, depressed  Affect:  intensity is blunted  Speech:  low volume, soft, monotonous   Language: fluent and intact in English  Psychomotor, Gait, Musculoskeletal:  no evidence of tardive dyskinesia, dystonia, or tics  Thought Process:  goal oriented, linear, less than logical  Associations:  no loose associations  Thought Content:  endorses suicidal ideation and auditory hallucinations of Channing's voice instructing her to commit suicide, " unable to contract for safety with linens in her room (plan to strangle self), denies HI, denies visual hallucinations, endorses urges to cut self as a form of self-injury but not with suicidal intent  Insight:  limited  Judgement:  limited  Oriented to:  month/year, time, person, and place   Attention Span and Concentration:  fair  Recent and Remote Memory:  intact  Fund of Knowledge:  appropriate         Labs:     No results found for this or any previous visit (from the past 24 hour(s)).

## 2021-03-31 NOTE — PLAN OF CARE
Pt slept 7.5 hours, appeared to sleep through the night. No safety issues or concerns reported. Will continue to monitor.

## 2021-03-31 NOTE — PLAN OF CARE
..Pt was visible in milieu, did not attend scheduled groups or socialize with peers. Affect was blunted, flat. Mood was depressed, anxious. Pt ate meals and was compliant with medications. Pt continues to have AH and chronic thoughts of SI/SIB. Will continue to monitor.

## 2021-04-01 PROCEDURE — 250N000013 HC RX MED GY IP 250 OP 250 PS 637: Performed by: PSYCHIATRY & NEUROLOGY

## 2021-04-01 PROCEDURE — 99232 SBSQ HOSP IP/OBS MODERATE 35: CPT | Performed by: PSYCHIATRY & NEUROLOGY

## 2021-04-01 PROCEDURE — 90832 PSYTX W PT 30 MINUTES: CPT

## 2021-04-01 PROCEDURE — 124N000002 HC R&B MH UMMC

## 2021-04-01 PROCEDURE — 250N000013 HC RX MED GY IP 250 OP 250 PS 637: Performed by: NURSE PRACTITIONER

## 2021-04-01 RX ADMIN — POLYETHYLENE GLYCOL 3350 17 G: 17 POWDER, FOR SOLUTION ORAL at 09:26

## 2021-04-01 RX ADMIN — VENLAFAXINE HYDROCHLORIDE 225 MG: 150 CAPSULE, EXTENDED RELEASE ORAL at 09:25

## 2021-04-01 RX ADMIN — OLANZAPINE 10 MG: 10 TABLET, FILM COATED ORAL at 10:42

## 2021-04-01 RX ADMIN — HYDROXYZINE HYDROCHLORIDE 25 MG: 25 TABLET, FILM COATED ORAL at 15:58

## 2021-04-01 RX ADMIN — ROPINIROLE HYDROCHLORIDE 1 MG: 1 TABLET, FILM COATED ORAL at 21:16

## 2021-04-01 RX ADMIN — CLONAZEPAM 0.5 MG: 0.5 TABLET ORAL at 14:21

## 2021-04-01 RX ADMIN — BUPROPION HYDROCHLORIDE 150 MG: 150 TABLET, EXTENDED RELEASE ORAL at 09:25

## 2021-04-01 RX ADMIN — PROTRIPTYLINE HYDROCHLORIDE 10 MG: 10 TABLET ORAL at 21:16

## 2021-04-01 RX ADMIN — QUETIAPINE FUMARATE 300 MG: 300 TABLET, EXTENDED RELEASE ORAL at 21:16

## 2021-04-01 RX ADMIN — QUETIAPINE FUMARATE 300 MG: 300 TABLET, EXTENDED RELEASE ORAL at 09:26

## 2021-04-01 RX ADMIN — RAMELTEON 8 MG: 8 TABLET ORAL at 21:16

## 2021-04-01 RX ADMIN — CLONAZEPAM 0.5 MG: 0.5 TABLET ORAL at 21:17

## 2021-04-01 RX ADMIN — LAMOTRIGINE 50 MG: 25 TABLET ORAL at 09:25

## 2021-04-01 ASSESSMENT — ACTIVITIES OF DAILY LIVING (ADL)
HYGIENE/GROOMING: INDEPENDENT
LAUNDRY: WITH SUPERVISION
LAUNDRY: WITH SUPERVISION
ORAL_HYGIENE: INDEPENDENT
HYGIENE/GROOMING: INDEPENDENT
DRESS: INDEPENDENT
ORAL_HYGIENE: INDEPENDENT
DRESS: INDEPENDENT

## 2021-04-01 NOTE — PLAN OF CARE
Pt. willing to engage in a 1:1 with this staff.  Pt. states that she is depressed, has some suicidal ideation but stays that she is safe on the unit.  Pt. reports that her auditory hallucination is a 6 out of 10 with 10 being high.  Pt. states that she is experiencing self-injurious thoughts but currently not acting on them.  Pt. refusing all groups, is sitting in the lounge per treatment plan.  Pt. spends majority of her day reading a book.  Pt.'s affect is very blunted and flat.  Pt. accepted a prn medication for her auditory hallucination.

## 2021-04-01 NOTE — PLAN OF CARE
BEHAVIORAL TEAM DISCUSSION    Participants: Dr. Murray; Sarah Evangelista and Alicia Coto, BAHMAN's; Charleen Booth RN  Progress: Pt continues to report SI but offers willingness to not act on the thoughts.  She utilizes SIB for coping and needs redirection for this.  She appears disinterested in unit programming and isolates.  Pt appears entrenched in dynamic of feeling defeated or disinterested in trying on a different perspective.  Pt is off and on compliant with medications.  Anticipated length of stay: pt has an interview with [facility] today and has [facility] funding in place now.    Continued Stay Criteria/Rationale: pt appropriate for acute care and treatment team is working to address the behaviors that interfere with change.  Her symptoms / behaviors complicate placement efforts and these are being actively addressed.    Medical/Physical: uneventful  Precautions:   Behavioral Orders   Procedures     Code 1 - Restrict to Unit     Routine Programming     As clinically indicated     Status 15     Every 15 minutes.     Suicide precautions     Patients on Suicide Precautions should have a Combination Diet ordered that includes a Diet selection(s) AND a Behavioral Tray selection for Safe Tray - with utensils, or Safe Tray - NO utensils       Plan: continue with psychiatry and active medication management; goal is to increase lamictal.  She has interview today for adult foster care.  Locking pt out of her room at various times remains part of treatment strategy  Rationale for change in precautions or plan: no change.

## 2021-04-01 NOTE — PROGRESS NOTES
At 4 pm snack time Pt was observed sitting in her room drinking juice.  During rounds female PA staff observed Pt folding aluminum foil from juice cup lid and attempting to scratch her forearm.  Staff intervened and took the foil away.  She declined PRN medications but complied with lockout from her room at 5:30 pm and remained in the lounge until 10 pm.  Pt took her bedtime medications as scheduled.  Pt continues to report auditory hallucinations of satan's voice and some SIB urges.  She took PRN Clonazepam which she states is helpful.  No further SIB activity noted.

## 2021-04-01 NOTE — PROGRESS NOTES
[facility],  Psychiatric Progress Note      Impression:     Misty Parham is a 35-year-old female admitted to [facility] on 2/10/2021.  She was admitted under ongoing civil commitment MI with [facility] in [facility] after taking 4-5 tabs of Unisom, 4-5 tabs of Ibuprofen and 4-5 tabs of Tylenol twice daily for 3 months in an attempt to commit suicide.  She was experiencing auditory hallucinations of Satan's voice commanding her to commit suicide.  Upon admission, provisional discharge was not revoked.  She had stopped taking medications several weeks prior to admission.  Since admission, Seroquel XR, Effexor ER, Rozerem and Protriptyline were restarted.  A Lamictal titration was restarted but has been slow to progress given her inconsistent adherence.  PRNs of Klonopin, Seroquel, Hydroxyzine Zyprexa and Trazodone were initiated.  She began refusing medications on 2/23 and has been taking them inconsistently since.  She was spending nearly all of her time in her room and showed little motivation for treatment, so a room lock out was initiated as it has been during previous hospitalizations.  She continues to report auditory hallucinations of Satan instructing her to commit suicide, and to not take her medications.  She continues to report suicidal ideation.  Insight is poor.  She has been intermittently superficially scratching her forearms, and her diet order was changed to finger foods; staff are checking her room which is directly across from the desk to ensure she doesn't have plasticware or other objects with which to scratch herself.  On 3/25 she reported she had attempted to strangle herself with linens on 3/23 and 3/24, and linens were removed from her room.  A 72-hour hold was placed 2/24 after she requested discharge, and her provisional discharge was revoked. She is committed with [facility] now.         Diagnoses:     Borderline personality  disorder  Major depressive disorder, recurrent, severe with psychotic features  Generalized anxiety disorder         Plan:     Medications: Continue Lamictal 50 mg daily, due to be increased 4/9 if she remains adherent. Continue Rozerem 8 mg at HS.  Continue Seroquel  mg BID with a back up of IM Zyprexa available per Bhargav.  Continue Protriptyline 10 mg with dinner.  Continue PRNs of Klonopin, Seroquel, Hydroxyzine, Zyprexa and Trazodone. Continue Wellbutrin  mg daily qam. No new medication changes today. Will increase Wellbutrin XL tomorrow.    Lock out of room from 9:30 AM - 12 PM, 1 PM - 3 PM and 5 PM to 9 PM.    No linens in room.  Order to be reviewed by provider daily. Still cannot contract for safety and order stays put.     She is under commitment MI with Bhargav (Zyprexa, Seroquel, Latuda, Abilify) in [facility].  Her provisional discharge was revoked.  She has had ECT in the past but had cognitive impairment with a MoCA score of 9/30 and improvements in sympotms noted only on treatment days.  Her mother will not allow her to return to her home.  Plan to pursue CADI funding and adult foster care placement, possibly at [facility]. [facility]I interview was completed 3/16. She has an interview with [facility] 4/1/21 and has [facility] funding in place now. She has outpatient psychiatry, therapy and case management. Will continue to provide support and structure, encourage better group attendance and goal setting.    Attestation:  Patient has been seen and evaluated by me, Jd Jones MD  [facility] Psychiatry: The patient was counseled on nature of illness and treatment plan/options  Care was coordinated with treatment team       Clinical Global Impressions  First:  Considering your total clinical experience with this particular patient population, how severe are the patient's symptoms at this time?: 7 (02/11/21 1842)  Compared to the patient's condition at the  "START of treatment, this patient's condition is: 4 (02/11/21 1842)  Most recent:  Considering your total clinical experience with this particular patient population, how severe are the patient's symptoms at this time?: 7 (04/01/21 1324)  Compared to the patient's condition at the START of treatment, this patient's condition is: 4 (04/01/21 1324)          Interim History:     The patient's care was discussed with the treatment team and chart notes were reviewed.  Pt was documented as sleeping throughout the overnight shift. Continues to feel depressed with chronic suicidal ideation and auditory hallucinations off and on. Staff reported patient was observed attempting to scratch her forearm using an aluminum foil lid from juice cup yesterday afternoon. She continues to have thoughts of self-injurious behavior and is not allowed linens in her room after trying to strangulate herself with them. She has been compliant with her medications last night and today. Continues to report auditory hallucinations, but subjectively does not appear to be responding to internal stimuli. Has remained withdrawn and hopeless, not attending any groups and continues to isolate herself. OT plans to work individually with patient.     Was seen in presence of PA student. Presented again very depressed and negative, with flat, sad affect. She continues to say \"I can't be helped\" and does not feel there has been any significant change in her mood and outlook. Still unable to contract for safety, stating \"I just want to die.\" Refuses to make any effort to get better, has no desire or motivation to improve and does not care if she is institutionalized. Discussed reasons to be more positive and motivated, including her two children, though she does not see any reason to live. Remains unwilling to go to groups and reports feeling tired again today. She was started on Wellbutrin 3/31/21 and is tolerating the medication well without adverse effects. " "She has been compliant with her medications over the past day as she can take them at the time she would like to now.  Discussed ECT with the patient; she reported she did have some memory loss with this, but it was not profound and improved following treatment. Discussed with her that she needed to help self if she didn't want to become and stay institutionalized, she was not receptive.           Medications:     Current Facility-Administered Medications   Medication     - MEDICATION INSTRUCTIONS -     acetaminophen (TYLENOL) tablet 650 mg     alum & mag hydroxide-simethicone (MAALOX) suspension 30 mL     buPROPion (WELLBUTRIN XL) 24 hr tablet 150 mg     clonazePAM (klonoPIN) tablet 0.5 mg     hydrOXYzine (ATARAX) tablet 25 mg     lamoTRIgine (LaMICtal) tablet 50 mg     QUEtiapine ER (SEROquel XR) 24 hr tablet 300 mg    Or     OLANZapine (zyPREXA) injection 10 mg     QUEtiapine ER (SEROquel XR) 24 hr tablet 300 mg    Or     OLANZapine (zyPREXA) injection 10 mg     OLANZapine (zyPREXA) tablet 10 mg    Or     OLANZapine (zyPREXA) injection 10 mg     polyethylene glycol (MIRALAX) Packet 17 g     protriptyline (VIVACTIL) tablet 10 mg     QUEtiapine (SEROquel) tablet 25 mg     ramelteon (ROZEREM) tablet 8 mg     rOPINIRole (REQUIP) tablet 1 mg     traZODone (DESYREL) tablet 50 mg     venlafaxine (EFFEXOR-XR) 24 hr capsule 225 mg             Allergies:   No Known Allergies         Psychiatric Examination:     /75   Pulse 107   Temp 97.9  F (36.6  C) (Oral)   Resp 17   Ht 1.651 m (5' 5\")   Wt 116.2 kg (256 lb 1.6 oz)   SpO2 98%   BMI 42.62 kg/m       Orthostatic Vitals     None         Appearance:  Somnolent, adequate grooming/hygiene   Attitude: somewhat cooperative   Eye Contact:  fair  Mood: anxious, depressed  Affect:  intensity is blunted  Speech:  quiet, soft, monotonous   Language: fluent and intact in English  Psychomotor, Gait, Musculoskeletal:  no evidence of tardive dyskinesia, dystonia, or " tics  Thought Process:  goal oriented, linear, less than logical  Associations:  no loose associations  Thought Content:  endorses suicidal ideation and auditory hallucinations of Channing's voice instructing her to commit suicide, unable to contract for safety with linens in her room (plan to strangle self), denies HI, denies visual hallucinations, endorses urges to cut self as a form of self-injury but not with suicidal intent  Insight:  limited  Judgement:  limited  Oriented to:  month/year, time, person, and place   Attention Span and Concentration:  fair  Recent and Remote Memory:  intact  Fund of Knowledge:  appropriate         Labs:     No results found for this or any previous visit (from the past 24 hour(s)).

## 2021-04-01 NOTE — PLAN OF CARE
CTC note        Work Completed: reviewed chart and remotely attended team discussion.  Entered team note.  Participated in outlier meeting.      Discharge plan or goal: stabilize adequately to move to adult foster care setting.                Barriers to discharge: pt cannot return to mother's home, she needs a more structured setting and so an Adult Foster Care placement is being pursued with assistance from her OP .

## 2021-04-02 PROCEDURE — 90832 PSYTX W PT 30 MINUTES: CPT

## 2021-04-02 PROCEDURE — 250N000013 HC RX MED GY IP 250 OP 250 PS 637: Performed by: PSYCHIATRY & NEUROLOGY

## 2021-04-02 PROCEDURE — 124N000002 HC R&B MH UMMC

## 2021-04-02 PROCEDURE — 99232 SBSQ HOSP IP/OBS MODERATE 35: CPT | Performed by: PSYCHIATRY & NEUROLOGY

## 2021-04-02 PROCEDURE — 250N000013 HC RX MED GY IP 250 OP 250 PS 637: Performed by: NURSE PRACTITIONER

## 2021-04-02 RX ORDER — BUPROPION HYDROCHLORIDE 150 MG/1
300 TABLET ORAL DAILY
Status: DISCONTINUED | OUTPATIENT
Start: 2021-04-03 | End: 2021-04-07 | Stop reason: HOSPADM

## 2021-04-02 RX ADMIN — POLYETHYLENE GLYCOL 3350 17 G: 17 POWDER, FOR SOLUTION ORAL at 08:22

## 2021-04-02 RX ADMIN — RAMELTEON 8 MG: 8 TABLET ORAL at 21:32

## 2021-04-02 RX ADMIN — VENLAFAXINE HYDROCHLORIDE 225 MG: 150 CAPSULE, EXTENDED RELEASE ORAL at 08:22

## 2021-04-02 RX ADMIN — HYDROXYZINE HYDROCHLORIDE 25 MG: 25 TABLET, FILM COATED ORAL at 21:52

## 2021-04-02 RX ADMIN — CLONAZEPAM 0.5 MG: 0.5 TABLET ORAL at 19:40

## 2021-04-02 RX ADMIN — QUETIAPINE FUMARATE 300 MG: 300 TABLET, EXTENDED RELEASE ORAL at 08:22

## 2021-04-02 RX ADMIN — QUETIAPINE FUMARATE 300 MG: 300 TABLET, EXTENDED RELEASE ORAL at 21:32

## 2021-04-02 RX ADMIN — LAMOTRIGINE 50 MG: 25 TABLET ORAL at 08:22

## 2021-04-02 RX ADMIN — ROPINIROLE HYDROCHLORIDE 1 MG: 1 TABLET, FILM COATED ORAL at 21:32

## 2021-04-02 RX ADMIN — PROTRIPTYLINE HYDROCHLORIDE 10 MG: 10 TABLET ORAL at 21:32

## 2021-04-02 RX ADMIN — BUPROPION HYDROCHLORIDE 150 MG: 150 TABLET, EXTENDED RELEASE ORAL at 08:22

## 2021-04-02 ASSESSMENT — ACTIVITIES OF DAILY LIVING (ADL)
ORAL_HYGIENE: INDEPENDENT
HYGIENE/GROOMING: INDEPENDENT
ORAL_HYGIENE: INDEPENDENT
HYGIENE/GROOMING: INDEPENDENT
DRESS: INDEPENDENT
LAUNDRY: WITH SUPERVISION
DRESS: INDEPENDENT
LAUNDRY: WITH SUPERVISION

## 2021-04-02 NOTE — PROGRESS NOTES
[facility],  Psychiatric Progress Note      Impression:     Misty Parham is a 35-year-old female admitted to [facility] on 2/10/2021.  She was admitted under ongoing civil commitment MI with [facility] in [facility] after taking 4-5 tabs of Unisom, 4-5 tabs of Ibuprofen and 4-5 tabs of Tylenol twice daily for 3 months in an attempt to commit suicide.  She was experiencing auditory hallucinations of Satan's voice commanding her to commit suicide.  Upon admission, provisional discharge was not revoked.  She had stopped taking medications several weeks prior to admission.  Since admission, Seroquel XR, Effexor ER, Rozerem and Protriptyline were restarted.  A Lamictal titration was restarted but has been slow to progress given her inconsistent adherence.  PRNs of Klonopin, Seroquel, Hydroxyzine Zyprexa and Trazodone were initiated.  She began refusing medications on 2/23 and has been taking them inconsistently since.  She was spending nearly all of her time in her room and showed little motivation for treatment, so a room lock out was initiated as it has been during previous hospitalizations.  She continues to report auditory hallucinations of Satan instructing her to commit suicide, and to not take her medications.  She continues to report suicidal ideation.  Insight is poor.  She has been intermittently superficially scratching her forearms, and her diet order was changed to finger foods; staff are checking her room which is directly across from the desk to ensure she doesn't have plasticware or other objects with which to scratch herself.  On 3/25 she reported she had attempted to strangle herself with linens on 3/23 and 3/24, and linens were removed from her room.  A 72-hour hold was placed 2/24 after she requested discharge, and her provisional discharge was revoked. She is committed with [facility] now.         Diagnoses:     Borderline personality  disorder  Major depressive disorder, recurrent, severe with psychotic features  Generalized anxiety disorder         Plan:     Medications: Continue Lamictal 50 mg daily, due to be increased 4/9 if she remains adherent. Continue Rozerem 8 mg at HS.  Continue Seroquel  mg BID with a back up of IM Zyprexa available per [facility].  Continue Protriptyline 10 mg with dinner.  Continue PRNs of Klonopin, Seroquel, Hydroxyzine, Zyprexa and Trazodone. Increase Wellbutrin XL to 300 mg daily qam. No other medication changes today.     Lock out of room from 9:30 AM - 12 PM, 1 PM - 3 PM and 5 PM to 9 PM.    No linens in room.  Order to be reviewed by provider daily. Still cannot contract for safety and order stays put.     She is under commitment MI with [facility] (Zyprexa, Seroquel, Latuda, Abilify) in [facility].  Her provisional discharge was revoked.  She has had ECT in the past but had cognitive impairment with a MoCA score of 9/30 and improvements in sympotms noted only on treatment days.  Her mother will not allow her to return to her home.  Plan to pursue CADI funding and adult foster care placement, possibly at [facility]. CADI interview was completed 3/16. She had an interview with [facility] 4/1/21 and has CADI funding in place now. She has outpatient psychiatry, therapy and case management. Will continue to provide support and structure, encourage better group attendance and goal setting.    Attestation:  Patient has been seen and evaluated by me, Jd Jones MD  [facility] Psychiatry: The patient was counseled on nature of illness and treatment plan/options  Care was coordinated with treatment team       Clinical Global Impressions  First:  Considering your total clinical experience with this particular patient population, how severe are the patient's symptoms at this time?: 7 (02/11/21 1842)  Compared to the patient's condition at the START of treatment, this patient's  "condition is: 4 (02/11/21 1842)  Most recent:  Considering your total clinical experience with this particular patient population, how severe are the patient's symptoms at this time?: 7 (04/01/21 1324)  Compared to the patient's condition at the START of treatment, this patient's condition is: 4 (04/01/21 1324)          Interim History:     The patient's care was discussed with the treatment team and chart notes were reviewed.  Pt was documented as sleeping throughout most of the overnight shift. Continues to feel depressed with chronic suicidal ideation and auditory hallucinations. Last evening, attempted to hide foil lid from juice cup in her book, but did give it to staff and did not attempt to scratch herself. Continues to have thoughts of self-injurious behavior; not allowed to have silverware, and not allowed linens in her room after trying to strangulate herself with them. Has been compliant with her medications over the past couple days. Continues to report auditory hallucinations, but subjectively does not appear to be responding to internal stimuli. Has remained withdrawn and hopeless, not attending any groups and continues to isolate herself. OT did meet with the patient for 1:1 therapy yesterday and will meet again with her today.     Was seen in presence of PA student. She continues to be very depressed and negative, with flat, sad affect. She felt her interview with [facility] went \"okay\" yesterday. She is unsure how she feels about going to a group home. She reports that she does have an outpatient counselor that she follows with, but feels she is \"just wasting his time.\" She continues to report feeling tired and feels she has been sleeping \"on and off\" overnight. She has been tolerating the Wellbutrin well and denies any adverse effects. She has been taking her medications consistently over the past couple days. Does not seem to be any significant change in mood or outlook again today. Still unable to " "contract for safety with thoughts of self-injurious behavior stating, \"I just like the pain.\"         Medications:     Current Facility-Administered Medications   Medication     - MEDICATION INSTRUCTIONS -     acetaminophen (TYLENOL) tablet 650 mg     alum & mag hydroxide-simethicone (MAALOX) suspension 30 mL     buPROPion (WELLBUTRIN XL) 24 hr tablet 150 mg     clonazePAM (klonoPIN) tablet 0.5 mg     hydrOXYzine (ATARAX) tablet 25 mg     lamoTRIgine (LaMICtal) tablet 50 mg     QUEtiapine ER (SEROquel XR) 24 hr tablet 300 mg    Or     OLANZapine (zyPREXA) injection 10 mg     QUEtiapine ER (SEROquel XR) 24 hr tablet 300 mg    Or     OLANZapine (zyPREXA) injection 10 mg     OLANZapine (zyPREXA) tablet 10 mg    Or     OLANZapine (zyPREXA) injection 10 mg     polyethylene glycol (MIRALAX) Packet 17 g     protriptyline (VIVACTIL) tablet 10 mg     QUEtiapine (SEROquel) tablet 25 mg     ramelteon (ROZEREM) tablet 8 mg     rOPINIRole (REQUIP) tablet 1 mg     traZODone (DESYREL) tablet 50 mg     venlafaxine (EFFEXOR-XR) 24 hr capsule 225 mg             Allergies:   No Known Allergies         Psychiatric Examination:     /80 (BP Location: Left arm)   Pulse 89   Temp 97.3  F (36.3  C) (Oral)   Resp 16   Ht 1.651 m (5' 5\")   Wt 116.2 kg (256 lb 1.6 oz)   SpO2 98%   BMI 42.62 kg/m     Orthostatic Vitals     None         Appearance:  Somnolent, adequate grooming/hygiene   Attitude: somewhat cooperative   Eye Contact:  fair  Mood: anxious, depressed  Affect:  intensity is blunted  Speech:  quiet, soft, monotonous   Language: fluent and intact in English  Psychomotor, Gait, Musculoskeletal:  no evidence of tardive dyskinesia, dystonia, or tics  Thought Process:  goal oriented, linear, less than logical  Associations:  no loose associations  Thought Content:  endorses suicidal ideation, unable to contract for safety with linens in her room (plan to strangle self), denies HI, denies visual hallucinations, endorses urges " to cut self as a form of self-injury but not with suicidal intent  Insight:  limited  Judgement:  limited  Oriented to:  month/year, time, person, and place   Attention Span and Concentration:  fair  Recent and Remote Memory:  intact  Fund of Knowledge:  appropriate         Labs:     No results found for this or any previous visit (from the past 24 hour(s)).
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
## 2021-04-02 NOTE — PROGRESS NOTES
Pt attended individual group therapy at the start of the shift.  While returning to her room she reported urges toward SIB and could not agree for safe behavior.  Writer went to talk with her and encouraged her to remain in the lounge until she could feel safer.  She complied with being in the lounge from 5:30 pm until 9:30 pm.  Pt had one incident this evening when attempting to return to her room she hid aluminum foil from juice lids in her book she was carrying.  Pt did give it to staff and did not attempt to hide any other items that could be used to scratch herself.  She took PRN Clonazepam with her evening medications and is quietly resting in bed at 10 pm.

## 2021-04-02 NOTE — PLAN OF CARE
Pt was withdrawn, isolative to room until she was told to spend time in the lounge. Not social with peers, spent time reading and watching tv on the periphery of the milieu. Pt did not attend groups. She ate meals and was compliant with medications. Pt continues to endorse AH and thoughts of SI/SIB. Will continue to monitor.

## 2021-04-02 NOTE — PROGRESS NOTES
"Writer met with pt for first time for 1:1 therapy. She was soft spoken but quite forthcoming. She said she has a history of bullying in grade school and that mother who is a functional alcoholic was critical and dismissive of her and \" doesn't want her back home.\" She feels some rejection and abandonment with that. She did say she understood mom's point of view as well about the \" pattern \" of SI. She said father had not much been in the picture but she didn't know why he was coming around more frequently now for she and her sister. She doesn't like conflict because she did not like the yelling in the house growing up and therefore she tends to withdraw and bury her feelings in all situations.  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 Then the boss left and her male boss was very critical and she felt inadequate again, similar feelings to with mother.    She feels SI and has strong urges to scratch for SIB. Writer alerted her nurse. She said that things were a little better by talking  With writer about emotions which she usually buries,. She said talking did not make the SIB worse and she would like to speak again tomorrow. She is stuck in saying if she completed suicide her sons would \" be OK.\"  worked with her on challenging the negative thoughts gently as she is stuck and self esteem is very low.   will continue to engage her tomorrow through the weekend. She said she might be open to expressing the dynamics she spoke about through art.  "

## 2021-04-03 PROCEDURE — 250N000013 HC RX MED GY IP 250 OP 250 PS 637: Performed by: NURSE PRACTITIONER

## 2021-04-03 PROCEDURE — 250N000013 HC RX MED GY IP 250 OP 250 PS 637: Performed by: PSYCHIATRY & NEUROLOGY

## 2021-04-03 PROCEDURE — 124N000002 HC R&B MH UMMC

## 2021-04-03 RX ADMIN — HYDROXYZINE HYDROCHLORIDE 25 MG: 25 TABLET, FILM COATED ORAL at 22:01

## 2021-04-03 RX ADMIN — QUETIAPINE FUMARATE 300 MG: 300 TABLET, EXTENDED RELEASE ORAL at 08:06

## 2021-04-03 RX ADMIN — PROTRIPTYLINE HYDROCHLORIDE 10 MG: 10 TABLET ORAL at 22:01

## 2021-04-03 RX ADMIN — POLYETHYLENE GLYCOL 3350 17 G: 17 POWDER, FOR SOLUTION ORAL at 08:06

## 2021-04-03 RX ADMIN — ROPINIROLE HYDROCHLORIDE 1 MG: 1 TABLET, FILM COATED ORAL at 22:01

## 2021-04-03 RX ADMIN — CLONAZEPAM 0.5 MG: 0.5 TABLET ORAL at 17:54

## 2021-04-03 RX ADMIN — LAMOTRIGINE 50 MG: 25 TABLET ORAL at 08:06

## 2021-04-03 RX ADMIN — QUETIAPINE FUMARATE 300 MG: 300 TABLET, EXTENDED RELEASE ORAL at 22:01

## 2021-04-03 RX ADMIN — VENLAFAXINE HYDROCHLORIDE 225 MG: 150 CAPSULE, EXTENDED RELEASE ORAL at 08:06

## 2021-04-03 RX ADMIN — BUPROPION HYDROCHLORIDE 300 MG: 150 TABLET, EXTENDED RELEASE ORAL at 08:06

## 2021-04-03 RX ADMIN — RAMELTEON 8 MG: 8 TABLET ORAL at 22:00

## 2021-04-03 NOTE — PLAN OF CARE
Pt was isolative to room except when locked out of her room during the day. Pt was withdrawn, not social with peers, spent time reading and watching tv in the lounge. Affect was blunted, flat. Mood was depressed, anxious. She ate meals and was compliant with medications, not attending groups. Pt endorses AH and thoughts of SI/SIB. Will continue to monitor.

## 2021-04-03 NOTE — PLAN OF CARE
Pt slept 7.25 hours, appeared to sleep through the night. No safety issues or concerns reported. Will continue to monitor.
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
## 2021-04-04 PROCEDURE — 124N000002 HC R&B MH UMMC

## 2021-04-04 PROCEDURE — 250N000013 HC RX MED GY IP 250 OP 250 PS 637: Performed by: PSYCHIATRY & NEUROLOGY

## 2021-04-04 PROCEDURE — 250N000013 HC RX MED GY IP 250 OP 250 PS 637: Performed by: NURSE PRACTITIONER

## 2021-04-04 RX ADMIN — BUPROPION HYDROCHLORIDE 300 MG: 150 TABLET, EXTENDED RELEASE ORAL at 08:17

## 2021-04-04 RX ADMIN — LAMOTRIGINE 50 MG: 25 TABLET ORAL at 08:17

## 2021-04-04 RX ADMIN — ROPINIROLE HYDROCHLORIDE 1 MG: 1 TABLET, FILM COATED ORAL at 22:39

## 2021-04-04 RX ADMIN — QUETIAPINE FUMARATE 300 MG: 300 TABLET, EXTENDED RELEASE ORAL at 22:39

## 2021-04-04 RX ADMIN — RAMELTEON 8 MG: 8 TABLET ORAL at 22:38

## 2021-04-04 RX ADMIN — PROTRIPTYLINE HYDROCHLORIDE 10 MG: 10 TABLET ORAL at 22:39

## 2021-04-04 RX ADMIN — VENLAFAXINE HYDROCHLORIDE 225 MG: 150 CAPSULE, EXTENDED RELEASE ORAL at 08:17

## 2021-04-04 RX ADMIN — CLONAZEPAM 0.5 MG: 0.5 TABLET ORAL at 22:42

## 2021-04-04 RX ADMIN — QUETIAPINE FUMARATE 300 MG: 300 TABLET, EXTENDED RELEASE ORAL at 08:17

## 2021-04-04 RX ADMIN — POLYETHYLENE GLYCOL 3350 17 G: 17 POWDER, FOR SOLUTION ORAL at 08:17

## 2021-04-04 ASSESSMENT — ACTIVITIES OF DAILY LIVING (ADL)
ORAL_HYGIENE: INDEPENDENT
HYGIENE/GROOMING: INDEPENDENT
DRESS: INDEPENDENT
LAUNDRY: WITH SUPERVISION
ORAL_HYGIENE: INDEPENDENT
HYGIENE/GROOMING: INDEPENDENT
LAUNDRY: WITH SUPERVISION
DRESS: INDEPENDENT

## 2021-04-04 NOTE — PLAN OF CARE
..Pt was isolative to room except when locked out of her room during the day. Pt was withdrawn, not social with peers, spent time reading and watching tv in the lounge. Affect was blunted, flat. Mood was depressed, anxious. She ate meals and was compliant with medications, not attending groups. Pt endorses AH and thoughts of SI/SIB. Will continue to monitor.

## 2021-04-04 NOTE — PLAN OF CARE
Pt slept 7.5 hours appeared to sleep through the night. No safety issues or concerns reported. Will continue to monitor.

## 2021-04-04 NOTE — PLAN OF CARE
"  Problem: Suicidal Behavior  Goal: Suicidal Behavior is Absent or Managed  Outcome: No Change   48 Hour Nursing Assessment     Patient evaluation continues. Is agreeable to check in. Patient presents with a flat, blunted affect.Isolative and withdrawn to self. Assessed mood, anxiety, thoughts and behavior. Patient denies Auditory or visual hallucinations.   Pt reports low mood.Rates depression 8/10 about being here. Anxiety 8/10.   Endorses SI with a plan to hang herself with bed sheets. \"If I had sheets in the room, I'd probably do it.\" No sheets available to pt room.    Pt states she using reading as a coping skill, pt encouraged to come out of her room, socialize, watch tv. Snacks offered, pt agreeable to this , eats 100% of dinner and snacks.  Writer encouraged pt to make needs known to staff, states \"I probably won't\"    1754;  1. What PRN did patient receive? Klonopin     2. What was the patient doing that led to the PRN medication? Anxiety 8/10     3. Did they require R/S? No     4. Side effects to PRN medication? None     5. After 1 Hour, patient appeared: States medication was effective, rates anxiety as a 6/10.  2201;  PRN Hydroxyzine administered with night meds per pt's request for anxiety 7/10.    Will continue to encourage participation in groups and developing healthy coping skills. Will continue to assess.             "

## 2021-04-05 PROCEDURE — 250N000013 HC RX MED GY IP 250 OP 250 PS 637: Performed by: NURSE PRACTITIONER

## 2021-04-05 PROCEDURE — 250N000013 HC RX MED GY IP 250 OP 250 PS 637: Performed by: PSYCHIATRY & NEUROLOGY

## 2021-04-05 PROCEDURE — 124N000002 HC R&B MH UMMC

## 2021-04-05 PROCEDURE — 99232 SBSQ HOSP IP/OBS MODERATE 35: CPT | Performed by: NURSE PRACTITIONER

## 2021-04-05 PROCEDURE — 90832 PSYTX W PT 30 MINUTES: CPT

## 2021-04-05 RX ADMIN — QUETIAPINE FUMARATE 300 MG: 300 TABLET, EXTENDED RELEASE ORAL at 22:02

## 2021-04-05 RX ADMIN — RAMELTEON 8 MG: 8 TABLET ORAL at 22:02

## 2021-04-05 RX ADMIN — CLONAZEPAM 0.5 MG: 0.5 TABLET ORAL at 22:05

## 2021-04-05 RX ADMIN — PROTRIPTYLINE HYDROCHLORIDE 10 MG: 10 TABLET ORAL at 22:02

## 2021-04-05 RX ADMIN — CLONAZEPAM 0.5 MG: 0.5 TABLET ORAL at 11:20

## 2021-04-05 RX ADMIN — POLYETHYLENE GLYCOL 3350 17 G: 17 POWDER, FOR SOLUTION ORAL at 08:44

## 2021-04-05 RX ADMIN — LAMOTRIGINE 50 MG: 25 TABLET ORAL at 08:44

## 2021-04-05 RX ADMIN — VENLAFAXINE HYDROCHLORIDE 225 MG: 150 CAPSULE, EXTENDED RELEASE ORAL at 08:43

## 2021-04-05 RX ADMIN — BUPROPION HYDROCHLORIDE 300 MG: 150 TABLET, EXTENDED RELEASE ORAL at 08:43

## 2021-04-05 RX ADMIN — ROPINIROLE HYDROCHLORIDE 1 MG: 1 TABLET, FILM COATED ORAL at 22:02

## 2021-04-05 RX ADMIN — QUETIAPINE FUMARATE 300 MG: 300 TABLET, EXTENDED RELEASE ORAL at 08:43

## 2021-04-05 ASSESSMENT — ACTIVITIES OF DAILY LIVING (ADL)
HYGIENE/GROOMING: INDEPENDENT
ORAL_HYGIENE: INDEPENDENT
LAUNDRY: WITH SUPERVISION
DRESS: INDEPENDENT
DRESS: INDEPENDENT
LAUNDRY: UNABLE TO COMPLETE
HYGIENE/GROOMING: INDEPENDENT
ORAL_HYGIENE: INDEPENDENT

## 2021-04-05 NOTE — PLAN OF CARE
Pt. Willing to engage in 1:1 with staff.  Pt. States that she feels depressed 8 out of 10 with 10 being high, 10 out of 10 for anxiety, and 8 out of 10 for self-injurious ideation.   Pt. States that she scratches herself due to anxiety; pt. Was willing to take a prn medication for her anxiety.  Pt. States that her suicidal ideation is chronic thoughts overall.  Pt. States that her self-injurious thoughts have been present since 8th grade.  Pt. Refuses groups, is spending her time reading books which she states that she enjoys.  Pt. Is hoping for a placement, states that she participated in an interview but does not know the outcome of that interview.  Pt. Currently has no concerns or questions at this time. Pt. Is pleasant and polite.

## 2021-04-05 NOTE — PROGRESS NOTES
[facility],  Psychiatric Progress Note      Impression:     Misty Parham is a 35-year-old female admitted to [facility] on 2/10/2021.  She was admitted under ongoing civil commitment MI with [facility] in [facility] after taking 4-5 tabs of Unisom, 4-5 tabs of Ibuprofen and 4-5 tabs of Tylenol twice daily for 3 months in an attempt to commit suicide.  She was experiencing auditory hallucinations of Satan's voice commanding her to commit suicide.  Upon admission, provisional discharge was not revoked.  She had stopped taking medications several weeks prior to admission.  Since admission, Seroquel XR, Effexor ER, Rozerem and Protriptyline were restarted.  A Lamictal titration was restarted but has been slow to progress given her inconsistent adherence.  Wellbutrin XL was initiated.  PRNs of Klonopin, Seroquel, Hydroxyzine Zyprexa and Trazodone were initiated.  She began refusing medications on 2/23 and has been taking them inconsistently since.  She was spending nearly all of her time in her room and showed little motivation for treatment, so a room lock out was initiated as it has been during previous hospitalizations.  She continues to report auditory hallucinations of Satan instructing her to commit suicide, and to not take her medications.  She continues to report suicidal ideation.  Insight is poor.  She has been intermittently superficially scratching her forearms, and her diet order was changed to finger foods; staff are checking her room which is directly across from the desk to ensure she doesn't have plasticware or other objects with which to scratch herself.  On 3/25 she reported she had attempted to strangle herself with linens on 3/23 and 3/24, and linens were removed from her room.  A 72-hour hold was placed 2/24 after she requested discharge, and her provisional discharge was revoked.  She is committed with [facility].         Diagnoses:      Borderline personality disorder  Major depressive disorder, recurrent, severe with psychotic features  Generalized anxiety disorder         Plan:     Medications: Continue Lamictal 50 mg daily, due to be increased 4/9 if she remains adherent. Continue Rozerem 8 mg at HS.  Continue Seroquel  mg BID with a back up of IM Zyprexa available per Bhargav.  Continue Protriptyline 10 mg with dinner.  Continue Wellbutrin  mg daily. Continue PRNs of Klonopin, Seroquel, Hydroxyzine, Zyprexa and Trazodone.    Lock out of room from 9:30 AM - 12 PM, 1 PM - 3 PM and 5 PM to 9 PM.    No linens in room.  Order to be reviewed by provider daily.     Individual therapy on the unit.    Weekly COVID-19 test ordered.    She is under commitment MI with Bhargav (Zyprexa, Seroquel, Latuda, Abilify) in [facility].  Her provisional discharge was revoked.  Plan to file for recommitment since her current commitment expires in May and has already been extended.  She has had ECT in the past but had cognitive impairment with a MoCA score of 9/30 and improvements in sympotms noted only on treatment days.  Her mother will not allow her to return to her home.  Plan to pursue CADI funding and adult foster care placement, possibly at [facility]. CADI interview was completed 3/16. She had an interview with [facility] 4/1/21 and has [facility]I funding in place now. She has outpatient psychiatry, therapy and case management. Will continue to provide support and structure, encourage better group attendance and goal setting.      Attestation:  Patient has been seen and evaluated by me, Nissa Morris, APRN, CNP  The patient was counseled on nature of illness and treatment plan/options  Care was coordinated with treatment team       Clinical Global Impressions  First:  Considering your total clinical experience with this particular patient population, how severe are the patient's symptoms at this time?: 7 (02/11/21  "1842)  Compared to the patient's condition at the START of treatment, this patient's condition is: 4 (02/11/21 1842)  Most recent:  Considering your total clinical experience with this particular patient population, how severe are the patient's symptoms at this time?: 7 (04/05/21 1404)  Compared to the patient's condition at the START of treatment, this patient's condition is: 4 (04/05/21 1404)          Interim History:     The patient's care was discussed with the treatment team and chart notes were reviewed.  Pt was documented as sleeping 7.25, 7.5 and 7 hours during the weekend overnight shifts.  She has not been attending groups.  She has been spending some time reading.  She has been taking PRNs of Klonopin and Hydroxyzine most days.  She denies side effects from meds.  States her interview with [facility] went well and she would rather go there than remain in the hospital.  Discussed importance of working on issues surrounding safety, as she remains unable to contract for safety with linens in her room due to suicidal ideation with a plan to strangle herself and auditory hallucinations of Satan's voice commanding her to commit suicide.  Mood is depressed and anxiety is \"up and down.\"  Auditory hallucinations are fairly constant and variable in volume.  States she is more accepting of her children moving to [place] for at least 1 year.  She has been talking to them infrequently as they have not been answering her calls.  She remains in contact with her ex-.  Pt reports that individual therapy has been beneficial.  Advised her to contemplate goals she would like to work towards with individual therapy.           Medications:     Current Facility-Administered Medications   Medication     - MEDICATION INSTRUCTIONS -     acetaminophen (TYLENOL) tablet 650 mg     alum & mag hydroxide-simethicone (MAALOX) suspension 30 mL     buPROPion (WELLBUTRIN XL) 24 hr tablet 300 mg     clonazePAM (klonoPIN) tablet 0.5 mg     " "hydrOXYzine (ATARAX) tablet 25 mg     lamoTRIgine (LaMICtal) tablet 50 mg     QUEtiapine ER (SEROquel XR) 24 hr tablet 300 mg    Or     OLANZapine (zyPREXA) injection 10 mg     QUEtiapine ER (SEROquel XR) 24 hr tablet 300 mg    Or     OLANZapine (zyPREXA) injection 10 mg     OLANZapine (zyPREXA) tablet 10 mg    Or     OLANZapine (zyPREXA) injection 10 mg     polyethylene glycol (MIRALAX) Packet 17 g     protriptyline (VIVACTIL) tablet 10 mg     QUEtiapine (SEROquel) tablet 25 mg     ramelteon (ROZEREM) tablet 8 mg     rOPINIRole (REQUIP) tablet 1 mg     traZODone (DESYREL) tablet 50 mg     venlafaxine (EFFEXOR-XR) 24 hr capsule 225 mg             Allergies:   No Known Allergies         Psychiatric Examination:     /78   Pulse 95   Temp 97.6  F (36.4  C)   Resp 16   Ht 1.651 m (5' 5\")   Wt 116.2 kg (256 lb 1.6 oz)   SpO2 99%   BMI 42.62 kg/m     Orthostatic Vitals     None         Appearance:  alert, adequate grooming/hygiene   Attitude:  cooperative   Eye Contact:  fair  Mood: depressed, anxiety is \"up and down\"  Affect:  intensity is blunted  Speech:  quiet, soft, monotonous   Language: fluent and intact in English  Psychomotor, Gait, Musculoskeletal:  no evidence of tardive dyskinesia, dystonia, or tics  Thought Process:  goal oriented, linear, less than logical  Associations:  no loose associations  Thought Content:  endorses suicidal ideation, unable to contract for safety with linens in her room (plan to strangle self), denies HI, denies visual hallucinations, endorses urges to cut self as a form of self-injury but not with suicidal intent  Insight:  limited  Judgement:  limited  Oriented to:  month/year, time, person, and place   Attention Span and Concentration:  fair  Recent and Remote Memory:  intact  Fund of Knowledge:  appropriate         Labs:     No results found for this or any previous visit (from the past 24 hour(s)).  "

## 2021-04-05 NOTE — PLAN OF CARE
Pt slept 7 hours, awake for first half hour of shift reading,  appeared to sleep through the rest of the shift. No safety issues or concerns reported. Will continue to monitor.

## 2021-04-05 NOTE — PLAN OF CARE
"  Problem: Suicidal and SIB Behavior  Goal: Suicidal/SIB Behavior is Absent or Managed  Outcome: No Change  Note: Patient reported having suicidal thoughts to strangle self. She said that she attempted to strangle self with a blanket last week, and this was the reason she is not allowed to have sheets and blankets in her room. She said she wishes to be dead, and that's he overdosed on sleeping medications twice.  Said she want to die.  Rated depression at 9/10, anxiety at 8/10. Reported hearing voices telling her: \"It's time for you to die\", \"It's easy, don't worry about it\", \"You don't deserve to live\", \"You are not a good person\", \"You should die\".     Self injurious behaviors today: patient stated: \"I like to do it\", \"I like the feeling of pain and burning after that\", \"I've been doing it since 7th gr\". \"I don't want to stop doing it, I like the feeling\". Pt has very superficial scratches on bilateral upper forearm, no bleeding. She said she has scratched all day today, since morning. Said she used the foil cover of a cup of juice. The only time she could have scratched self would've been at the beginning of the shift, when she appeared to be sleeping under the weighted blanket.     Patient said that nothing has worked for her depression, anxiety and for SI and SIB. Said she does not believe that she could ever stop scratching self. She reports history of stopping prescribed medications when she decides that they no longer work. Said she had ECT in the past that was effective, but she stopped it as she had memory loss.     Patient's room was searched and staff found 5 foil juice covers hidden in her folder and above the light fixture in the bathroom. All mildly sharp objects, like tooth paste, comb, and lotions were collected from patient's room. She was instructed to ask for the supply when she needs to use it and return it promptly back after she is done. As per order, patient has been out of her room from 5 pm " to 9 pm.   Patient was offered PRN Zyprexa and Seroquel for presence of auditory hallucinations. She initially agreed to take Zyprexa PRN, and when it was offered to her, she declined to take it. Patient encouraged to ask for PRN when she has urges to scratch, she was agreeable    Addendum: PRN Clonazepam 0.5 mg was given with HS medications per pt's request.     Suggestion:  Please consider offering the juice in a paper cup v.s. the plastic cups with foil cover.

## 2021-04-06 PROCEDURE — 99233 SBSQ HOSP IP/OBS HIGH 50: CPT | Performed by: NURSE PRACTITIONER

## 2021-04-06 PROCEDURE — 250N000013 HC RX MED GY IP 250 OP 250 PS 637: Performed by: NURSE PRACTITIONER

## 2021-04-06 PROCEDURE — 250N000013 HC RX MED GY IP 250 OP 250 PS 637: Performed by: PSYCHIATRY & NEUROLOGY

## 2021-04-06 PROCEDURE — 87635 SARS-COV-2 COVID-19 AMP PRB: CPT | Performed by: NURSE PRACTITIONER

## 2021-04-06 PROCEDURE — 124N000002 HC R&B MH UMMC

## 2021-04-06 RX ADMIN — QUETIAPINE FUMARATE 300 MG: 300 TABLET, EXTENDED RELEASE ORAL at 22:24

## 2021-04-06 RX ADMIN — LAMOTRIGINE 50 MG: 25 TABLET ORAL at 08:42

## 2021-04-06 RX ADMIN — RAMELTEON 8 MG: 8 TABLET ORAL at 22:24

## 2021-04-06 RX ADMIN — VENLAFAXINE HYDROCHLORIDE 225 MG: 150 CAPSULE, EXTENDED RELEASE ORAL at 08:42

## 2021-04-06 RX ADMIN — POLYETHYLENE GLYCOL 3350 17 G: 17 POWDER, FOR SOLUTION ORAL at 08:41

## 2021-04-06 RX ADMIN — BUPROPION HYDROCHLORIDE 300 MG: 150 TABLET, EXTENDED RELEASE ORAL at 08:43

## 2021-04-06 RX ADMIN — QUETIAPINE FUMARATE 300 MG: 300 TABLET, EXTENDED RELEASE ORAL at 08:42

## 2021-04-06 RX ADMIN — PROTRIPTYLINE HYDROCHLORIDE 10 MG: 10 TABLET ORAL at 22:24

## 2021-04-06 RX ADMIN — CLONAZEPAM 0.5 MG: 0.5 TABLET ORAL at 20:13

## 2021-04-06 RX ADMIN — ROPINIROLE HYDROCHLORIDE 1 MG: 1 TABLET, FILM COATED ORAL at 22:24

## 2021-04-06 ASSESSMENT — ACTIVITIES OF DAILY LIVING (ADL)
HYGIENE/GROOMING: INDEPENDENT
LAUNDRY: UNABLE TO COMPLETE
ORAL_HYGIENE: INDEPENDENT
DRESS: INDEPENDENT

## 2021-04-06 NOTE — PLAN OF CARE
[facility] called [facility] pre-petition screening, [phone]. Left message.  Faxed petition to [phone].    [facility] called again. Gave verbal report.

## 2021-04-06 NOTE — PLAN OF CARE
Night Shift Summary (4/5/21 into 04/06/21)    Pt in bed sleeping at start of shift. Breathing quiet and unlabored. Appears to have slept 7 hours.    Pt continues on standard every 15 min safety checks. Pt on SI precautions order, with no related events occurring this shift.     Unable to assess SI,SIB due to promote sleep    Will continue to monitor and assess.

## 2021-04-06 NOTE — PROGRESS NOTES
" met with pt 1:1 therapy. She was soft spoken. She started the meeting with \"I'm tired , I want to die, Channing tells me to self harm and I deserve the pain.\" Writer explained that kerwinr is meeting with her to get unstuck from that position , she doesn't have to be perfect but she could try a few things to get better by taking small steps.     She has as she explains for her whole life felt worthless. Contributing factors: bullying in grade school,mom yells, sister yells, dad is quiet until angry and then yells.  She took the opposite approach and never expresses feelings, she internalizes. Ex  as she describes interactions is emotionally abusive.     With some gentle coaching she was able to identify that she feels good when she is talking to her sons. She said she would \" try\" to make small steps to get better.     She identified she will read her book and try to scratch \" less.\" She didn't think she could go to groups or interact with peers in the milieu. Kerwinr gave her a  few suggestions to try to treat herself as if she is worthy.  suggested: pacing, a shower, having a tea, interacting with a peer etc. She said she would try some of these things and would see kerwinr wednesday or Thursday to report how it went.      and she talked about she didn't have to be perfect, but just show an effort to improve and she agreed she will try.  "

## 2021-04-06 NOTE — PLAN OF CARE
"Problem: Suicidal Behavior  Goal: Suicidal Behavior is Absent or Managed  4/5/2021 2146 by Randall Armstrong RN  Outcome: Improving  Flowsheets (Taken 4/5/2021 2146)  Mutually Determined Action Steps (Suicidal Behavior Absent/Managed):   verbalizes safety check rationale   shares suicidal thoughts    Patient spent most of the evening out on the lounge watching movies with peers but was withdrawn. She endorsed chronic suicidal ideation with plan to \"hang myself\" and urges to engage in SIB because \"it makes me feel good.\" She had a flat, blunted and sad affect but was pleasant and mostly cooperative. Patient rated her depression as 9/10 and anxiety as 9/10 but declined PRN medication to help manage her anxiety. She reported hearing voices telling her \"to just end it all.\" Patient denied visual hallucinations or racing thoughts. She was compliant with scheduled medications, ate meals and snacks, and was agreeable to reporting any maladaptive urges to staff.   "

## 2021-04-06 NOTE — PROGRESS NOTES
[facility],  Psychiatric Progress Note      Impression:     Misty Parham is a 35-year-old female admitted to [facility] on 2/10/2021.  She was admitted under ongoing civil commitment MI with [facility] in [facility] after taking 4-5 tabs of Unisom, 4-5 tabs of Ibuprofen and 4-5 tabs of Tylenol twice daily for 3 months in an attempt to commit suicide.  She was experiencing auditory hallucinations of Satan's voice commanding her to commit suicide.  Upon admission, provisional discharge was not revoked.  She had stopped taking medications several weeks prior to admission.  Since admission, Seroquel XR, Effexor ER, Rozerem and Protriptyline were restarted.  A Lamictal titration was restarted but has been slow to progress given her inconsistent adherence.  Wellbutrin XL was initiated.  PRNs of Klonopin, Seroquel, Hydroxyzine Zyprexa and Trazodone were initiated.  She began refusing medications on 2/23 and has been taking them inconsistently since.  She was spending nearly all of her time in her room and showed little motivation for treatment, so a room lock out was initiated as it has been during previous hospitalizations.  She continues to report auditory hallucinations of Satan instructing her to commit suicide, and to not take her medications.  She continues to report suicidal ideation.  Insight is poor.  She has been intermittently superficially scratching her forearms, and her diet order was changed to finger foods; staff are checking her room which is directly across from the desk to ensure she doesn't have plasticware or other objects with which to scratch herself.  On 3/25 she reported she had attempted to strangle herself with linens on 3/23 and 3/24, and linens were removed from her room.  As of 4/6 she contracted for safety with her linens.  A 72-hour hold was placed 2/24 after she requested discharge, and her provisional discharge was revoked.   She is committed with [facility]av.         Diagnoses:     Borderline personality disorder  Major depressive disorder, recurrent, severe with psychotic features  Generalized anxiety disorder         Plan:     Medications: Continue Lamictal 50 mg daily, due to be increased 4/9 if she remains adherent. Continue Rozerem 8 mg at HS.  Continue Seroquel  mg BID with a back up of IM Zyprexa available per Bhargav.  Continue Protriptyline 10 mg with dinner.  Continue Wellbutrin  mg daily. Continue PRNs of Klonopin, Seroquel, Hydroxyzine, Zyprexa and Trazodone.    Lock out of room from 9:30 AM - 12 PM, 1 PM - 3 PM and 5 PM to 9 PM.    She may have linens in her room.  If, in the future, she is unable to contract for safety with them, they should be removed.    Individual therapy on the unit.    She is under commitment MI with [facility] (Zyprexa, Seroquel, Latuda, Abilify) in [facility].  Her provisional discharge was revoked.  On 4/6, provider completed paperwork for recommitment since her current commitment expires in May and has already been extended.  She has had ECT in the past but had cognitive impairment with a MoCA score of 9/30 and improvements in sympotms noted only on treatment days.  Her mother will not allow her to return to her home.  Plan to pursue CADI funding and adult foster care placement, possibly at [facility]. [facility] interview was completed 3/16. She had an interview with [facility] 4/1/21 and has [facility]I funding in place now. She has outpatient psychiatry, therapy and case management. Will continue to provide support and structure, encourage better group attendance and goal setting.      Attestation:  Patient has been seen and evaluated by me, Nissa Morris, APRN, CNP  The patient was counseled on nature of illness and treatment plan/options  Care was coordinated with treatment team       Clinical Global Impressions  First:  Considering your total clinical experience with this  "particular patient population, how severe are the patient's symptoms at this time?: 7 (02/11/21 1842)  Compared to the patient's condition at the START of treatment, this patient's condition is: 4 (02/11/21 1842)  Most recent:  Considering your total clinical experience with this particular patient population, how severe are the patient's symptoms at this time?: 7 (04/05/21 1404)  Compared to the patient's condition at the START of treatment, this patient's condition is: 4 (04/05/21 1404)          Interim History:     The patient's care was discussed with the treatment team and chart notes were reviewed.  Pt was documented as sleeping 7 hours during the overnight shift.  She took PRN Klonopin x 2 yesterday.  She had individual therapy last evening.  She was able to accurately reflect goals and coping skills that were discussed.  She said therapy has been beneficial.  She said, \"I'm going to try to stop cutting so much.\"  States she can contract for safety with linens in her room and plans to leave her room and go to the [facility] to read if she has urges to strangle herself.  States that auditory hallucinations are reduced in volume soon after she takes Seroquel but then increase before her next dose is due.  Mood is depressed and anxiety is \"kind of high.\"  Informed pt of the plan to file for recommitment.           Medications:     Current Facility-Administered Medications   Medication     - MEDICATION INSTRUCTIONS -     acetaminophen (TYLENOL) tablet 650 mg     alum & mag hydroxide-simethicone (MAALOX) suspension 30 mL     buPROPion (WELLBUTRIN XL) 24 hr tablet 300 mg     clonazePAM (klonoPIN) tablet 0.5 mg     hydrOXYzine (ATARAX) tablet 25 mg     lamoTRIgine (LaMICtal) tablet 50 mg     QUEtiapine ER (SEROquel XR) 24 hr tablet 300 mg    Or     OLANZapine (zyPREXA) injection 10 mg     QUEtiapine ER (SEROquel XR) 24 hr tablet 300 mg    Or     OLANZapine (zyPREXA) injection 10 mg     OLANZapine (zyPREXA) tablet 10 mg " "   Or     OLANZapine (zyPREXA) injection 10 mg     polyethylene glycol (MIRALAX) Packet 17 g     protriptyline (VIVACTIL) tablet 10 mg     QUEtiapine (SEROquel) tablet 25 mg     ramelteon (ROZEREM) tablet 8 mg     rOPINIRole (REQUIP) tablet 1 mg     traZODone (DESYREL) tablet 50 mg     venlafaxine (EFFEXOR-XR) 24 hr capsule 225 mg             Allergies:   No Known Allergies         Psychiatric Examination:     /77 (BP Location: Left arm)   Pulse 86   Temp 98.1  F (36.7  C)   Resp 16   Ht 1.651 m (5' 5\")   Wt 116.2 kg (256 lb 1.6 oz)   SpO2 99%   BMI 42.62 kg/m     Orthostatic Vitals     None         Appearance:  alert, adequate grooming/hygiene   Attitude:  cooperative   Eye Contact:  fair  Mood: depressed, anxious  Affect:  intensity is blunted  Speech:  quiet, soft, monotonous   Language: fluent and intact in English  Psychomotor, Gait, Musculoskeletal:  no evidence of tardive dyskinesia, dystonia, or tics  Thought Process:  goal oriented, linear, less than logical  Associations:  no loose associations  Thought Content:  endorses suicidal ideation, able to contract for safety with linens in her room, denies HI, denies visual hallucinations, endorses urges to cut self as a form of self-injury but not with suicidal intent  Insight:  limited  Judgement:  limited  Oriented to:  month/year, time, person, and place   Attention Span and Concentration:  fair  Recent and Remote Memory:  intact  Fund of Knowledge:  appropriate         Labs:     No results found for this or any previous visit (from the past 24 hour(s)).  "

## 2021-04-07 ENCOUNTER — HOSPITAL ENCOUNTER (INPATIENT)
Facility: CLINIC | Age: 36
LOS: 5 days | Discharge: PSYCHIATRIC OR SUBSTANCE USE FACILITY | End: 2021-04-12
Attending: INTERNAL MEDICINE | Admitting: INTERNAL MEDICINE
Payer: COMMERCIAL

## 2021-04-07 ENCOUNTER — APPOINTMENT (OUTPATIENT)
Dept: CT IMAGING | Facility: CLINIC | Age: 36
End: 2021-04-07
Attending: INTERNAL MEDICINE
Payer: COMMERCIAL

## 2021-04-07 VITALS
TEMPERATURE: 100.6 F | WEIGHT: 256.1 LBS | OXYGEN SATURATION: 95 % | HEIGHT: 65 IN | BODY MASS INDEX: 42.67 KG/M2 | SYSTOLIC BLOOD PRESSURE: 96 MMHG | HEART RATE: 111 BPM | RESPIRATION RATE: 16 BRPM | DIASTOLIC BLOOD PRESSURE: 63 MMHG

## 2021-04-07 DIAGNOSIS — L03.211 FACIAL CELLULITIS: ICD-10-CM

## 2021-04-07 DIAGNOSIS — K59.00 CONSTIPATION, UNSPECIFIED CONSTIPATION TYPE: Primary | ICD-10-CM

## 2021-04-07 LAB
ANION GAP SERPL CALCULATED.3IONS-SCNC: 8 MMOL/L (ref 3–14)
BUN SERPL-MCNC: 11 MG/DL (ref 7–30)
CALCIUM SERPL-MCNC: 8.9 MG/DL (ref 8.5–10.1)
CHLORIDE SERPL-SCNC: 104 MMOL/L (ref 94–109)
CO2 SERPL-SCNC: 22 MMOL/L (ref 20–32)
CREAT SERPL-MCNC: 0.75 MG/DL (ref 0.52–1.04)
CRP SERPL-MCNC: 69 MG/L (ref 0–8)
ERYTHROCYTE [DISTWIDTH] IN BLOOD BY AUTOMATED COUNT: 13.9 % (ref 10–15)
GFR SERPL CREATININE-BSD FRML MDRD: >90 ML/MIN/{1.73_M2}
GLUCOSE SERPL-MCNC: 109 MG/DL (ref 70–99)
HCT VFR BLD AUTO: 37.2 % (ref 35–47)
HGB BLD-MCNC: 12.3 G/DL (ref 11.7–15.7)
LACTATE BLD-SCNC: 1.2 MMOL/L (ref 0.7–2)
MCH RBC QN AUTO: 29.1 PG (ref 26.5–33)
MCHC RBC AUTO-ENTMCNC: 33.1 G/DL (ref 31.5–36.5)
MCV RBC AUTO: 88 FL (ref 78–100)
PLATELET # BLD AUTO: 304 10E9/L (ref 150–450)
POTASSIUM SERPL-SCNC: 3.7 MMOL/L (ref 3.4–5.3)
RBC # BLD AUTO: 4.23 10E12/L (ref 3.8–5.2)
SODIUM SERPL-SCNC: 134 MMOL/L (ref 133–144)
WBC # BLD AUTO: 14.5 10E9/L (ref 4–11)

## 2021-04-07 PROCEDURE — 85027 COMPLETE CBC AUTOMATED: CPT | Performed by: PHYSICIAN ASSISTANT

## 2021-04-07 PROCEDURE — 258N000003 HC RX IP 258 OP 636

## 2021-04-07 PROCEDURE — 87040 BLOOD CULTURE FOR BACTERIA: CPT | Performed by: PHYSICIAN ASSISTANT

## 2021-04-07 PROCEDURE — 80048 BASIC METABOLIC PNL TOTAL CA: CPT | Performed by: PHYSICIAN ASSISTANT

## 2021-04-07 PROCEDURE — 250N000013 HC RX MED GY IP 250 OP 250 PS 637: Performed by: PHYSICIAN ASSISTANT

## 2021-04-07 PROCEDURE — 86140 C-REACTIVE PROTEIN: CPT | Performed by: PHYSICIAN ASSISTANT

## 2021-04-07 PROCEDURE — 250N000011 HC RX IP 250 OP 636: Performed by: PHYSICIAN ASSISTANT

## 2021-04-07 PROCEDURE — 250N000013 HC RX MED GY IP 250 OP 250 PS 637: Performed by: PSYCHIATRY & NEUROLOGY

## 2021-04-07 PROCEDURE — 250N000011 HC RX IP 250 OP 636: Performed by: INTERNAL MEDICINE

## 2021-04-07 PROCEDURE — 258N000003 HC RX IP 258 OP 636: Performed by: INTERNAL MEDICINE

## 2021-04-07 PROCEDURE — 36415 COLL VENOUS BLD VENIPUNCTURE: CPT | Performed by: PHYSICIAN ASSISTANT

## 2021-04-07 PROCEDURE — 250N000013 HC RX MED GY IP 250 OP 250 PS 637: Performed by: INTERNAL MEDICINE

## 2021-04-07 PROCEDURE — 70487 CT MAXILLOFACIAL W/DYE: CPT

## 2021-04-07 PROCEDURE — 250N000009 HC RX 250: Performed by: INTERNAL MEDICINE

## 2021-04-07 PROCEDURE — 70487 CT MAXILLOFACIAL W/DYE: CPT | Mod: 26 | Performed by: RADIOLOGY

## 2021-04-07 PROCEDURE — 99207 PR APP CREDIT; MD BILLING SHARED VISIT: CPT | Mod: 59 | Performed by: PHYSICIAN ASSISTANT

## 2021-04-07 PROCEDURE — 120N000002 HC R&B MED SURG/OB UMMC

## 2021-04-07 PROCEDURE — 36415 COLL VENOUS BLD VENIPUNCTURE: CPT | Performed by: INTERNAL MEDICINE

## 2021-04-07 PROCEDURE — 99223 1ST HOSP IP/OBS HIGH 75: CPT | Mod: AI | Performed by: INTERNAL MEDICINE

## 2021-04-07 PROCEDURE — 83605 ASSAY OF LACTIC ACID: CPT | Performed by: INTERNAL MEDICINE

## 2021-04-07 PROCEDURE — 99239 HOSP IP/OBS DSCHRG MGMT >30: CPT | Performed by: NURSE PRACTITIONER

## 2021-04-07 PROCEDURE — 250N000013 HC RX MED GY IP 250 OP 250 PS 637: Performed by: NURSE PRACTITIONER

## 2021-04-07 RX ORDER — LAMOTRIGINE 25 MG/1
50 TABLET ORAL ONCE
Status: COMPLETED | OUTPATIENT
Start: 2021-04-08 | End: 2021-04-08

## 2021-04-07 RX ORDER — ONDANSETRON 4 MG/1
4 TABLET, ORALLY DISINTEGRATING ORAL EVERY 6 HOURS PRN
Status: DISCONTINUED | OUTPATIENT
Start: 2021-04-07 | End: 2021-04-12 | Stop reason: HOSPADM

## 2021-04-07 RX ORDER — OLANZAPINE 10 MG/2ML
10 INJECTION, POWDER, FOR SOLUTION INTRAMUSCULAR 2 TIMES DAILY PRN
Status: DISCONTINUED | OUTPATIENT
Start: 2021-04-07 | End: 2021-04-07

## 2021-04-07 RX ORDER — OLANZAPINE 10 MG/2ML
10 INJECTION, POWDER, FOR SOLUTION INTRAMUSCULAR 3 TIMES DAILY PRN
Status: ON HOLD | DISCHARGE
Start: 2021-04-07 | End: 2021-04-16

## 2021-04-07 RX ORDER — IOPAMIDOL 755 MG/ML
100 INJECTION, SOLUTION INTRAVASCULAR ONCE
Status: COMPLETED | OUTPATIENT
Start: 2021-04-07 | End: 2021-04-07

## 2021-04-07 RX ORDER — SODIUM CHLORIDE 9 MG/ML
INJECTION, SOLUTION INTRAVENOUS CONTINUOUS
Status: DISCONTINUED | OUTPATIENT
Start: 2021-04-07 | End: 2021-04-10

## 2021-04-07 RX ORDER — LIDOCAINE 40 MG/G
CREAM TOPICAL
Status: DISCONTINUED | OUTPATIENT
Start: 2021-04-07 | End: 2021-04-12 | Stop reason: HOSPADM

## 2021-04-07 RX ORDER — QUETIAPINE 300 MG/1
300 TABLET, FILM COATED, EXTENDED RELEASE ORAL 2 TIMES DAILY
Status: ON HOLD | DISCHARGE
Start: 2021-04-07 | End: 2021-04-16

## 2021-04-07 RX ORDER — POLYETHYLENE GLYCOL 3350 17 G/17G
17 POWDER, FOR SOLUTION ORAL DAILY
Status: DISCONTINUED | OUTPATIENT
Start: 2021-04-08 | End: 2021-04-10

## 2021-04-07 RX ORDER — PROTRIPTYLINE HYDROCHLORIDE 10 MG/1
10 TABLET, FILM COATED ORAL AT BEDTIME
Status: DISCONTINUED | OUTPATIENT
Start: 2021-04-07 | End: 2021-04-12 | Stop reason: HOSPADM

## 2021-04-07 RX ORDER — QUETIAPINE 300 MG/1
300 TABLET, FILM COATED, EXTENDED RELEASE ORAL EVERY MORNING
Status: DISCONTINUED | OUTPATIENT
Start: 2021-04-08 | End: 2021-04-12 | Stop reason: HOSPADM

## 2021-04-07 RX ORDER — RAMELTEON 8 MG/1
8 TABLET ORAL AT BEDTIME
Status: DISCONTINUED | OUTPATIENT
Start: 2021-04-07 | End: 2021-04-12 | Stop reason: HOSPADM

## 2021-04-07 RX ORDER — OLANZAPINE 10 MG/2ML
10 INJECTION, POWDER, FOR SOLUTION INTRAMUSCULAR 3 TIMES DAILY PRN
Status: DISCONTINUED | OUTPATIENT
Start: 2021-04-07 | End: 2021-04-12 | Stop reason: HOSPADM

## 2021-04-07 RX ORDER — LAMOTRIGINE 100 MG/1
100 TABLET ORAL EVERY MORNING
Status: ON HOLD | DISCHARGE
Start: 2021-04-09 | End: 2021-04-16

## 2021-04-07 RX ORDER — BUPROPION HYDROCHLORIDE 300 MG/1
300 TABLET ORAL DAILY
Status: ON HOLD | DISCHARGE
Start: 2021-04-08 | End: 2021-04-16

## 2021-04-07 RX ORDER — SODIUM CHLORIDE, SODIUM LACTATE, POTASSIUM CHLORIDE, CALCIUM CHLORIDE 600; 310; 30; 20 MG/100ML; MG/100ML; MG/100ML; MG/100ML
INJECTION, SOLUTION INTRAVENOUS CONTINUOUS
Status: DISCONTINUED | OUTPATIENT
Start: 2021-04-07 | End: 2021-04-07

## 2021-04-07 RX ORDER — QUETIAPINE FUMARATE 25 MG/1
25 TABLET, FILM COATED ORAL 3 TIMES DAILY PRN
Status: ON HOLD | DISCHARGE
Start: 2021-04-07 | End: 2021-04-16

## 2021-04-07 RX ORDER — ROPINIROLE 1 MG/1
1 TABLET, FILM COATED ORAL AT BEDTIME
Status: DISCONTINUED | OUTPATIENT
Start: 2021-04-07 | End: 2021-04-12 | Stop reason: HOSPADM

## 2021-04-07 RX ORDER — LAMOTRIGINE 25 MG/1
50 TABLET ORAL ONCE
Status: ON HOLD | DISCHARGE
Start: 2021-04-07 | End: 2021-04-11

## 2021-04-07 RX ORDER — QUETIAPINE 300 MG/1
300 TABLET, FILM COATED, EXTENDED RELEASE ORAL 2 TIMES DAILY
Status: DISCONTINUED | OUTPATIENT
Start: 2021-04-07 | End: 2021-04-07

## 2021-04-07 RX ORDER — OLANZAPINE 10 MG/1
10 TABLET ORAL 3 TIMES DAILY PRN
Status: ON HOLD | DISCHARGE
Start: 2021-04-07 | End: 2021-04-16

## 2021-04-07 RX ORDER — LIDOCAINE 40 MG/G
CREAM TOPICAL
Status: DISCONTINUED | OUTPATIENT
Start: 2021-04-07 | End: 2021-04-07 | Stop reason: HOSPADM

## 2021-04-07 RX ORDER — OLANZAPINE 2.5 MG/1
10 TABLET, FILM COATED ORAL 3 TIMES DAILY PRN
Status: DISCONTINUED | OUTPATIENT
Start: 2021-04-07 | End: 2021-04-12 | Stop reason: HOSPADM

## 2021-04-07 RX ORDER — AMPICILLIN AND SULBACTAM 2; 1 G/1; G/1
3 INJECTION, POWDER, FOR SOLUTION INTRAMUSCULAR; INTRAVENOUS EVERY 6 HOURS
Status: DISPENSED | OUTPATIENT
Start: 2021-04-07 | End: 2021-04-11

## 2021-04-07 RX ORDER — HYDROXYZINE HYDROCHLORIDE 25 MG/1
25 TABLET, FILM COATED ORAL EVERY 4 HOURS PRN
Status: ON HOLD | DISCHARGE
Start: 2021-04-07 | End: 2021-04-16

## 2021-04-07 RX ORDER — OLANZAPINE 10 MG/2ML
10 INJECTION, POWDER, FOR SOLUTION INTRAMUSCULAR 3 TIMES DAILY PRN
Status: DISCONTINUED | OUTPATIENT
Start: 2021-04-07 | End: 2021-04-07

## 2021-04-07 RX ORDER — POLYETHYLENE GLYCOL 3350 17 G/17G
17 POWDER, FOR SOLUTION ORAL DAILY
Status: ON HOLD | DISCHARGE
Start: 2021-04-08 | End: 2021-04-16

## 2021-04-07 RX ORDER — OLANZAPINE 10 MG/2ML
10 INJECTION, POWDER, FOR SOLUTION INTRAMUSCULAR 2 TIMES DAILY
Status: ON HOLD | DISCHARGE
Start: 2021-04-07 | End: 2021-04-16

## 2021-04-07 RX ORDER — SODIUM CHLORIDE 9 MG/ML
INJECTION, SOLUTION INTRAVENOUS
Status: DISPENSED
Start: 2021-04-07 | End: 2021-04-08

## 2021-04-07 RX ORDER — SODIUM CHLORIDE 9 MG/ML
INJECTION, SOLUTION INTRAVENOUS
Status: COMPLETED
Start: 2021-04-07 | End: 2021-04-07

## 2021-04-07 RX ORDER — BUPROPION HYDROCHLORIDE 300 MG/1
300 TABLET ORAL DAILY
Status: DISCONTINUED | OUTPATIENT
Start: 2021-04-08 | End: 2021-04-12 | Stop reason: HOSPADM

## 2021-04-07 RX ORDER — VENLAFAXINE HYDROCHLORIDE 75 MG/1
225 CAPSULE, EXTENDED RELEASE ORAL
Status: DISCONTINUED | OUTPATIENT
Start: 2021-04-08 | End: 2021-04-12 | Stop reason: HOSPADM

## 2021-04-07 RX ORDER — CEFAZOLIN SODIUM 1 G/50ML
1750 SOLUTION INTRAVENOUS EVERY 12 HOURS
Status: DISCONTINUED | OUTPATIENT
Start: 2021-04-07 | End: 2021-04-07 | Stop reason: HOSPADM

## 2021-04-07 RX ORDER — HYDROXYZINE HYDROCHLORIDE 25 MG/1
25 TABLET, FILM COATED ORAL EVERY 4 HOURS PRN
Status: DISCONTINUED | OUTPATIENT
Start: 2021-04-07 | End: 2021-04-12 | Stop reason: HOSPADM

## 2021-04-07 RX ORDER — CLONAZEPAM 0.5 MG/1
0.5 TABLET ORAL 2 TIMES DAILY PRN
Status: DISCONTINUED | OUTPATIENT
Start: 2021-04-07 | End: 2021-04-12 | Stop reason: HOSPADM

## 2021-04-07 RX ORDER — OLANZAPINE 2.5 MG/1
10 TABLET, FILM COATED ORAL 3 TIMES DAILY PRN
Status: DISCONTINUED | OUTPATIENT
Start: 2021-04-07 | End: 2021-04-07

## 2021-04-07 RX ORDER — QUETIAPINE 300 MG/1
300 TABLET, FILM COATED, EXTENDED RELEASE ORAL AT BEDTIME
Status: DISCONTINUED | OUTPATIENT
Start: 2021-04-07 | End: 2021-04-12 | Stop reason: HOSPADM

## 2021-04-07 RX ORDER — PROTRIPTYLINE HYDROCHLORIDE 10 MG/1
10 TABLET, FILM COATED ORAL AT BEDTIME
Status: ON HOLD | DISCHARGE
Start: 2021-04-07 | End: 2021-04-16

## 2021-04-07 RX ORDER — IBUPROFEN 600 MG/1
600 TABLET, FILM COATED ORAL EVERY 6 HOURS PRN
Status: DISCONTINUED | OUTPATIENT
Start: 2021-04-07 | End: 2021-04-12 | Stop reason: HOSPADM

## 2021-04-07 RX ORDER — CEFAZOLIN SODIUM 1 G/50ML
1750 SOLUTION INTRAVENOUS EVERY 12 HOURS
Status: DISCONTINUED | OUTPATIENT
Start: 2021-04-07 | End: 2021-04-07

## 2021-04-07 RX ORDER — ACETAMINOPHEN 325 MG/1
650 TABLET ORAL EVERY 4 HOURS PRN
Status: ON HOLD | DISCHARGE
Start: 2021-04-07 | End: 2021-04-16

## 2021-04-07 RX ORDER — ACETAMINOPHEN 325 MG/1
650 TABLET ORAL EVERY 4 HOURS PRN
Status: DISCONTINUED | OUTPATIENT
Start: 2021-04-07 | End: 2021-04-12 | Stop reason: HOSPADM

## 2021-04-07 RX ORDER — OLANZAPINE 10 MG/2ML
10 INJECTION, POWDER, FOR SOLUTION INTRAMUSCULAR EVERY MORNING
Status: DISCONTINUED | OUTPATIENT
Start: 2021-04-08 | End: 2021-04-12 | Stop reason: HOSPADM

## 2021-04-07 RX ORDER — LAMOTRIGINE 100 MG/1
100 TABLET ORAL EVERY MORNING
Status: DISCONTINUED | OUTPATIENT
Start: 2021-04-09 | End: 2021-04-12 | Stop reason: HOSPADM

## 2021-04-07 RX ORDER — OLANZAPINE 10 MG/2ML
10 INJECTION, POWDER, FOR SOLUTION INTRAMUSCULAR AT BEDTIME
Status: DISCONTINUED | OUTPATIENT
Start: 2021-04-07 | End: 2021-04-12 | Stop reason: HOSPADM

## 2021-04-07 RX ORDER — VENLAFAXINE HYDROCHLORIDE 75 MG/1
225 CAPSULE, EXTENDED RELEASE ORAL
Status: ON HOLD | DISCHARGE
Start: 2021-04-08 | End: 2021-04-16

## 2021-04-07 RX ADMIN — ACETAMINOPHEN 650 MG: 325 TABLET, FILM COATED ORAL at 08:01

## 2021-04-07 RX ADMIN — SODIUM CHLORIDE: 9 INJECTION, SOLUTION INTRAVENOUS at 18:08

## 2021-04-07 RX ADMIN — AMPICILLIN SODIUM AND SULBACTAM SODIUM 3 G: 2; 1 INJECTION, POWDER, FOR SOLUTION INTRAMUSCULAR; INTRAVENOUS at 14:33

## 2021-04-07 RX ADMIN — QUETIAPINE FUMARATE 300 MG: 300 TABLET, EXTENDED RELEASE ORAL at 08:01

## 2021-04-07 RX ADMIN — ROPINIROLE HYDROCHLORIDE 1 MG: 1 TABLET, FILM COATED ORAL at 22:41

## 2021-04-07 RX ADMIN — VENLAFAXINE HYDROCHLORIDE 225 MG: 150 CAPSULE, EXTENDED RELEASE ORAL at 08:01

## 2021-04-07 RX ADMIN — ONDANSETRON 4 MG: 4 TABLET, ORALLY DISINTEGRATING ORAL at 14:47

## 2021-04-07 RX ADMIN — IOPAMIDOL 75 ML: 755 INJECTION, SOLUTION INTRAVENOUS at 14:18

## 2021-04-07 RX ADMIN — IBUPROFEN 600 MG: 600 TABLET, FILM COATED ORAL at 16:04

## 2021-04-07 RX ADMIN — BUPROPION HYDROCHLORIDE 300 MG: 150 TABLET, EXTENDED RELEASE ORAL at 08:00

## 2021-04-07 RX ADMIN — AMPICILLIN SODIUM AND SULBACTAM SODIUM 3 G: 2; 1 INJECTION, POWDER, FOR SOLUTION INTRAMUSCULAR; INTRAVENOUS at 20:44

## 2021-04-07 RX ADMIN — PROTRIPTYLINE HYDROCHLORIDE 10 MG: 10 TABLET ORAL at 22:41

## 2021-04-07 RX ADMIN — QUETIAPINE FUMARATE 300 MG: 300 TABLET, EXTENDED RELEASE ORAL at 22:41

## 2021-04-07 RX ADMIN — LAMOTRIGINE 50 MG: 25 TABLET ORAL at 08:01

## 2021-04-07 RX ADMIN — POLYETHYLENE GLYCOL 3350 17 G: 17 POWDER, FOR SOLUTION ORAL at 08:01

## 2021-04-07 RX ADMIN — SODIUM CHLORIDE 40 ML: 9 INJECTION, SOLUTION INTRAVENOUS at 14:18

## 2021-04-07 RX ADMIN — SODIUM CHLORIDE: 9 INJECTION, SOLUTION INTRAVENOUS at 18:07

## 2021-04-07 RX ADMIN — CLONAZEPAM 0.5 MG: 0.5 TABLET ORAL at 14:58

## 2021-04-07 RX ADMIN — RAMELTEON 8 MG: 8 TABLET ORAL at 22:41

## 2021-04-07 RX ADMIN — ACETAMINOPHEN 650 MG: 325 TABLET, FILM COATED ORAL at 14:53

## 2021-04-07 RX ADMIN — VANCOMYCIN HYDROCHLORIDE 1500 MG: 10 INJECTION, POWDER, LYOPHILIZED, FOR SOLUTION INTRAVENOUS at 16:03

## 2021-04-07 NOTE — PLAN OF CARE
Problem: Sleep Disturbance (Anxiety Signs/Symptoms)  Goal: Improved Sleep (Anxiety Signs/Symptoms)  Outcome: No Change  Flowsheets (Taken 4/7/2021 6404)  Mutually Determined Action Steps (Improved Sleep):   remains out of bed during day   identifies disturbance factors   uses relaxation techniques  Patient appeared to be awake for most of the night. She had difficulty falling and staying asleep. She slept for about 3.25 hours. No other problems identified.

## 2021-04-07 NOTE — H&P
[facility]    History and Physical - Hospitalist Service       Date of Admission: 4/7/2021    Assessment & Plan   Misty Parham is a 35 year old female with a history of depression, anxiety, borderline personality disorder who was admitted to inpatient behavioral health on 2/10/21 with psychosis. She is now admitted to Internal Medicine for management of facial cellulitis.     Sepsis 2/2 facial cellulitis: Rapid onset of facial edema and erythema within the last 12 hours. Also with leukocytosis and fever. Concern for developing abscess. She is hemodynamically stable.    - Start IV Unasyn, IV vancomycin   - LR IVF bolus   - Blood cultures x2 pending   - MRSA nasal swab pending   - Trend CBC, CRP   - Low threshold for CT if worsening    Suicidal ideation  Borderline personality disorder  MDD, recurrent, severe with psychotic features  Generalized anxiety disorder  She was admitted to inpatient behavioral health under commitment MI with [facility]argav after provisional discharge was revoked. Continues to endorse SI. Has been scratching at her arms in attempt to injure herself.    - Psychiatry, SW consults   - Suicide, self-injury precautions   - Bedside attendant   - Continue regimen of bupropion, clonazepam, hydroxyzine, lamotrigine, quetiapine, olanzapine, protriptyline, ramelteon, ropinirole, trazodone, venlafaxine   - Psychiatry pursing recommitment with plans for pursuing [employer] funding and adult foster care placement         Diet:   Regular  DVT Prophylaxis: Pneumatic Compression Devices  Allen Catheter: not present  Code Status:   FULL         Disposition Plan   Expected discharge: 2 - 3 days, recommended to inpatient behavioral health unit once clinically improved, antibiotic plan established.  Entered: Alaina Cameron PA-C 04/07/2021, 9:40 AM     The patient's care was discussed with the Attending Physician, Dr. Rivero, Patient and Psychiatry team.    Alaina Cameron,  MAIA    Contact information available via [facility] Paging/Directory      ______________________________________________________________________    Chief Complaint   Fever, facial pain    History is obtained from the patient    History of Present Illness   Misty Parham is a 35 year old female with a history of depression, anxiety, borderline personality disorder who was admitted to inpatient behavioral health on 2/10/21 with psychosis. She is now admitted to Internal Medicine for management of facial cellulitis.     Internal Medicine was consulted by Psychiatry this morning for evaluation of new fever and facial redness and swelling.     She had a very small red spot over her right forehead yesterday. This morning she awoke and found that the right side of her face was red and swollen. She was also febrile to 102.8. She is having pain along the right TMJ and right side of her forehead. She has not noticed any drainage. No change in her vision. She is able to move her eyes without pain and open her mouth without difficulty. She reports having chronically poor dentition but no history of any dental abscesses that she recalls. She has no tooth pain, swelling in her mouth. No history of skin infections or abscesses elsewhere. No headaches, cough, nasal congestion, rhinorrhea, sore throat, difficulty swallowing, shortness of breath, or chest pain.     Review of Systems    The 10 point Review of Systems is negative other than noted in the HPI or here.     Past Medical History    I have reviewed this patient's medical history and updated it with pertinent information if needed.   Past Medical History:   Diagnosis Date     Depressive disorder        Past Surgical History   I have reviewed this patient's surgical history and updated it with pertinent information if needed.  Past Surgical History:   Procedure Laterality Date     CHOLECYSTECTOMY         Social History   I have reviewed this patient's social history and updated  it with pertinent information if needed.  Social History     Tobacco Use     Smoking status: Never Smoker     Smokeless tobacco: Never Used   Substance Use Topics     Alcohol use: Not Currently     Frequency: Never     Drug use: Never       Family History   I have reviewed this patient's family history and updated it with pertinent information if needed.  Family History   Problem Relation Age of Onset     Diabetes Father        Prior to Admission Medications   Cannot display prior to admission medications because the patient has not been admitted in this contact.     Allergies   No Known Allergies    Physical Exam   Vital Signs:                    Weight: 0 lbs 0 oz    Constitutional: Awake and alert, in no apparent distress. Sitting up comfortably in bed.   Eyes: Sclera clear, anicteric. EOM intact. PERRL.  ENT: Facial edema, erythema, and warmth extending from R TMJ over the lateral R forehead. There is an approximately 2x2 cm area of induration over the right eyebrow/temporal region. No fluctuance or drainage. See photo below. Oropharynx is clear. Poor dentition but no appreciable caries.   Respiratory: Breathing non-labored. CTAB.  Cardiovascular:  RRR, normal S1/S2. No rubs or murmurs.   GI: Soft, non-tender, non-distended. Bowel sounds present.   Skin: Good color. No jaundice.   Neurologic: Alert and fully oriented.          Data   ROUTINE IP LABS (Last four results)  Recent Labs   Lab 04/07/21  0828      POTASSIUM 3.7   CHLORIDE 104   CO2 22   ANIONGAP 8   *   BUN 11   CR 0.75   CRISTINE 8.9     Recent Labs   Lab 04/07/21  0828   WBC 14.5*   RBC 4.23   HGB 12.3   HCT 37.2   MCV 88   MCH 29.1   MCHC 33.1   RDW 13.9        No lab results found in last 7 days.     Glucose Values Latest Ref Rng & Units 4/7/2021   Bedside Glucose (mg/dl )  - --   GLUCOSE 70 - 99 mg/dL 109(H)   Some recent data might be hidden        All labs personally reviewed in [facility].  See A&P for additional  results.     Unresulted Labs Ordered in the Past 30 Days of this Admission     Date and Time Order Name Status Description    4/7/2021 0804 Blood culture In process     4/7/2021 0804 Blood culture In process

## 2021-04-07 NOTE — PROGRESS NOTES
Pt. transferred to  for antibiotic medical intervention.  Pt. understands the reason for the transfer, has no questions at this time. Pt.'s blood pressure 96/63, pulse 111, temperature 100.6.  Security will be present with the transfer to .  Nursing report given to  nursing staff.

## 2021-04-07 NOTE — PHARMACY-ADMISSION MEDICATION HISTORY
Admission Medication History Completed by Pharmacy    See [facility] Admission Navigator for allergy information, preferred outpatient pharmacy, prior to admission medications and immunization status.     Medication History Sources:     Discharge Summary and MAR review on 4/7/21 from behavioral health    Changes made to PTA medication list (reason):    Added: None    Deleted: None    Changed: None    Additional Information:    None    Prior to Admission medications    Medication Sig Last Dose Taking? Auth Provider   acetaminophen (TYLENOL) 325 MG tablet Take 2 tablets (650 mg) by mouth every 4 hours as needed for mild pain (to moderate pain)   Nissa Morris APRN CNP   buPROPion (WELLBUTRIN XL) 300 MG 24 hr tablet Take 1 tablet (300 mg) by mouth daily   Nissa Morris APRN CNP   clonazePAM (KLONOPIN) 0.5 MG tablet Take 0.5 mg by mouth 2 times daily as needed for anxiety   Unknown, Entered By History   hydrOXYzine (ATARAX) 25 MG tablet Take 1 tablet (25 mg) by mouth every 4 hours as needed for anxiety   Nissa Morris APRN CNP   lamoTRIgine (LAMICTAL) 100 MG tablet Take 1 tablet (100 mg) by mouth every morning beginning 4/9/2021.   Nissa Morris APRN CNP   lamoTRIgine (LAMICTAL) 25 MG tablet Take 2 tablets (50 mg) by mouth once for 1 dose on 4/8/2020 at 8 AM.   Nissa Morris APRN CNP   OLANZapine (ZYPREXA) 10 MG tablet Take 1 tablet (10 mg) by mouth 3 times daily as needed (agitation associated with psychosis)   Nissa Morris APRN CNP   OLANZapine (ZYPREXA) injection Inject 10 mg into the muscle 3 times daily as needed for agitation (associated with psychosis)   Nissa Morris APRN CNP   OLANZapine (ZYPREXA) injection Inject 10 mg into the muscle 2 times daily .  Jarvised.  Administer IM Zyprexa if she refuses PO Seroquel XR.   Nissa Morris APRN CNP   polyethylene glycol (MIRALAX) 17 g packet Take 17 g by mouth daily   Nissa Morris  APRN CNP   protriptyline (VIVACTIL) 10 MG tablet Take 1 tablet (10 mg) by mouth At Bedtime   Nissa Morris APRN CNP   QUEtiapine (SEROQUEL) 25 MG tablet Take 1 tablet (25 mg) by mouth 3 times daily as needed (psychosis)   Nissa Morris APRN CNP   QUEtiapine ER (SEROQUEL XR) 300 MG 24 hr tablet Take 1 tablet (300 mg) by mouth 2 times daily .  Jarvised.  Administer IM Zyprexa if she refuses PO Seroquel XR.   Nissa Morris APRN CNP   ramelteon (ROZEREM) 8 MG tablet Take 1 tablet (8 mg) by mouth At Bedtime   Nissa Morris APRN CNP   rOPINIRole (REQUIP) 1 MG tablet Take 1 tablet (1 mg) by mouth At Bedtime   Nissa Morris APRN CNP   venlafaxine (EFFEXOR-XR) 75 MG 24 hr capsule Take 3 capsules (225 mg) by mouth daily (with breakfast)   Nissa Morris APRN CNP       Date completed: 04/07/21    Medication history completed by: Sunny Ryan [facility]

## 2021-04-07 NOTE — PLAN OF CARE
Call from Patricia Velazquez, pre-petition, [phone], requesting interview with pt. CTC explained the current situation, advised Patricia that CTC would talk to pt and could bring a phone to pt's room for interview.    [facility] spoke with pt who declined interview. CTC called Patricia, advised that interview was declined.     Call back from Patricia. States that they are supporting the petition for recommitment.

## 2021-04-07 NOTE — PHARMACY-VANCOMYCIN DOSING SERVICE
Pharmacy Vancomycin Initial Note  Date of Service 2021  Patient's  1985  35 year old, female    Indication: Sepsis and Skin and Soft Tissue Infection    Current estimated CrCl = Estimated Creatinine Clearance: 130.8 mL/min (based on SCr of 0.75 mg/dL).    Creatinine for last 3 days  2021:  8:28 AM Creatinine 0.75 mg/dL    Recent Vancomycin Level(s) for last 3 days  No results found for requested labs within last 72 hours.      Vancomycin IV Administrations (past 72 hours)      No vancomycin orders with administrations in past 72 hours.                Nephrotoxins and other renal medications (From now, onward)    Start     Dose/Rate Route Frequency Ordered Stop    21 1500  vancomycin (VANCOCIN) 1,500 mg in sodium chloride 0.9 % 250 mL intermittent infusion      1,500 mg  over 90 Minutes Intravenous EVERY 8 HOURS 21 1319      21 1400  ampicillin-sulbactam (UNASYN) 3 g vial to attach to  mL bag      3 g  over 1 Hours Intravenous EVERY 6 HOURS 21 1303      21 1303  ibuprofen (ADVIL/MOTRIN) tablet 600 mg      600 mg Oral EVERY 6 HOURS PRN 21 1303            Contrast Orders - past 72 hours (72h ago, onward)    None          Plan:  1.  Start vancomycin  1500 mg IV q8h. Patient did NOT get a dose on the behavioral health unit prior to transfer.  2.  Goal Trough Level: 15-20 mg/L since indication includes sepsis, 10-15mg/L if just SSTI  3.  Pharmacy will check trough levels as appropriate in 1-3 Days.    4. Serum creatinine levels will be ordered daily for the first week of therapy and at least twice weekly for subsequent weeks.    5. [place] method utilized to dose vancomycin therapy: Method 1    Echo Rivero, PharmD, BCPS

## 2021-04-07 NOTE — DISCHARGE INSTRUCTIONS
Behavioral Discharge Planning and Instructions      Summary:  You were admitted on 2/10/2021  due to Depression.  You were treated by Hina Morris NP and discharged on 04/07/2021 from [facility] to a medical floor. You remain on a court hold secondary to your commitment and cannot discharge from the medical floor with out a provisional discharge.    You are currently on commitment which expires 5/26/2021. A petition for recommitment has been filed.     Principal Diagnosis:   Borderline personality disorder  Major depressive disorder, recurrent, severe  Generalized anxiety disorder      Health Care Follow-up Appointments:     Currently, the plan is to return to mental health inpatient after being cleared medically    Attend all scheduled appointments with your outpatient providers. Call at least 24 hours in advance if you need to reschedule an appointment to ensure continued access to your outpatient providers.   Major Treatments, Procedures and Findings:  You were provided with: a psychiatric assessment, medication evaluation and/or management and milieu management    Symptoms to Report: losing more sleep, mood getting worse, thoughts of suicide or hallucinations    Early warning signs can include: increased depression or anxiety sleep disturbances increased thoughts or behaviors of suicide or self-harm  increased unusual thinking, such as paranoia or hearing voices    Safety and Wellness:  Take all medicines as directed.  Make no changes unless your doctor suggests them.        Follow treatment recommendations.  Refrain from alcohol and non-prescribed drugs.  If there is a concern for safety, call 911.      Advance Directives:   Scanned document on file with [employer]? No scanned doc  Is document scanned? Pt states no documents  Honoring Choices Your Rights Handout: Informed and given  Was more information offered? Materials given    Resources:   Crisis Intervention: 719.732.5115 or 034-049-8532 (TTY:  108.405.9410).  Call anytime for help.  [place] on [place] Illness (www.mn.davian.org): 914.973.3004 or 508-491-2271.  Alcoholics Anonymous (www.alcoholics-anonymous.org): Check your phone book for your local chapter.  Suicide Awareness Voices of Education (SAVE) (www.save.org): 238-123-LIIR (7283)  National Suicide Prevention Line (www.mentalhealthmn.org): 319-091-NQTK (0112)  Mental Health Consumer/Survivor Network of [place] (www.mhcsn.net): 719.747.5325 or 103-320-4083      The treatment team has appreciated the opportunity to work with you.     If you have any questions or concerns our unit number is [phone]

## 2021-04-07 NOTE — CONSULTS
[facility]  Consult Note - Hospitalist Service     Date of Admission:  2/10/2021  Consult Requested by: Nissa Morris CNP  Reason for Consult: Forehead lesion, fever    Assessment & Plan   Misty Parham is a 35 year old female with a history of depression, anxiety, borderline personality disorder who was admitted to inpatient behavioral health with psychosis.     Sepsis 2/2 facial cellulitis: Rapid onset of facial edema and erythema within the last 12 hours. Also with leukocytosis and fever. Concern for developing abscess.    - Will admit to Internal Medicine service for IV antibiotics (Unasyn, vancomycin)   - Blood cultures x2 pending   - Check MRSA nasal swab   - Continue to closely monitor    - Notified Hina Morris CNP    Addendum: Will place PIV and start IV Unasyn, vancomycin on behavioral health unit prior to transferring.      Patient's care was discussed with Dr. Rivero.     Alaina Cameron PA-C  [facility]  Contact information available via [facility] Paging/Directory    ______________________________________________________________________    Chief Complaint   Fever    History is obtained from the patient    History of Present Illness   Misty Parham is a 35 year old female with a history of depression, anxiety, borderline personality disorder who was admitted to inpatient behavioral health with psychosis. Medicine consulted today for evaluation of fever and forehead lesion.     She had a very small red spot over her right forehead yesterday. This morning she awoke and found that the right side of her face was red and swollen. She was also febrile to 102.8. She is having pain along the right TMJ and right side of her forehead. She has not noticed any drainage. No change in her vision. She is able to move her eyes without pain and open her mouth without difficulty. She reports having chronically poor  dentition but no history of any dental abscesses that she recalls. She has no tooth pain, swelling in her mouth. No history of skin infections or abscesses elsewhere. No headaches, cough, nasal congestion, rhinorrhea, sore throat, difficulty swallowing, shortness of breath, or chest pain.     Review of Systems   The 10 point Review of Systems is negative other than noted in the HPI or here.     Past Medical History    I have reviewed this patient's medical history and updated it with pertinent information if needed.   Past Medical History:   Diagnosis Date     Depressive disorder        Past Surgical History   I have reviewed this patient's surgical history and updated it with pertinent information if needed.  Past Surgical History:   Procedure Laterality Date     CHOLECYSTECTOMY         Social History   I have reviewed this patient's social history and updated it with pertinent information if needed.  Social History     Tobacco Use     Smoking status: Never Smoker     Smokeless tobacco: Never Used   Substance Use Topics     Alcohol use: Not Currently     Frequency: Never     Drug use: Never       Family History   I have reviewed this patient's family history and updated it with pertinent information if needed.  Family History   Problem Relation Age of Onset     Diabetes Father        Medications   Medications Prior to Admission   Medication Sig Dispense Refill Last Dose     clonazePAM (KLONOPIN) 0.5 MG tablet Take 0.5 mg by mouth 2 times daily as needed for anxiety   Unknown at Unknown time     lamoTRIgine (LAMICTAL) 150 MG tablet Take 2 tablets (300 mg) by mouth daily 60 tablet 0 Unknown at Unknown time     OLANZapine (ZYPREXA) 5 MG tablet Take 5 mg by mouth At Bedtime   Unknown at Unknown time     protriptyline (VIVACTIL) 10 MG tablet Take 1 tablet (10 mg) by mouth daily (with dinner) 30 tablet 0 Unknown at Unknown time     QUEtiapine ER (SEROQUEL XR) 200 MG 24 hr tablet Take 200 mg by mouth At Bedtime   2/6/2021      ramelteon (ROZEREM) 8 MG tablet Take 1 tablet (8 mg) by mouth At Bedtime 30 tablet 0 Unknown at Unknown time     rOPINIRole (REQUIP) 1 MG tablet Take 1 tablet (1 mg) by mouth At Bedtime 30 tablet 0 Unknown at Unknown time     venlafaxine (EFFEXOR-XR) 150 MG 24 hr capsule Take 150 mg by mouth daily   Unknown at Unknown time       Allergies   No Known Allergies    Physical Exam   Vital Signs: Temp: 102.8  F (39.3  C) Temp src: Oral BP: 133/83 Pulse: 99   Resp: 16        Weight: 256 lbs 1.6 oz    Constitutional: Awake and alert, in no apparent distress. Sitting up comfortably in bed.   Eyes: Sclera clear, anicteric. EOM intact. PERRL.  ENT: Facial edema, erythema, and warmth extending from R TMJ over the lateral R forehead. There is an approximately 2x2 cm area of induration over the right eyebrow/temporal region. No fluctuance or drainage. See photo below. Oropharynx is clear. Poor dentition but no appreciable caries.   Respiratory: Breathing non-labored. CTAB.  Cardiovascular:  RRR, normal S1/S2. No rubs or murmurs.   GI: Soft, non-tender, non-distended. Bowel sounds present.   Skin: Good color. No jaundice.   Neurologic: Alert and fully oriented.              Data   ROUTINE IP LABS (Last four results)  Recent Labs   Lab 04/07/21  0828      POTASSIUM 3.7   CHLORIDE 104   CO2 22   ANIONGAP 8   *   BUN 11   CR 0.75   CRISTINE 8.9     Recent Labs   Lab 04/07/21  0828   WBC 14.5*   RBC 4.23   HGB 12.3   HCT 37.2   MCV 88   MCH 29.1   MCHC 33.1   RDW 13.9        No lab results found in last 7 days.     Glucose Values Latest Ref Rng & Units 4/7/2021   Bedside Glucose (mg/dl )  - --   GLUCOSE 70 - 99 mg/dL 109(H)   Some recent data might be hidden

## 2021-04-07 NOTE — DISCHARGE SUMMARY
Psychiatric Discharge Summary    Misty Parahm MRN# [id number]   Age: 35 year old YOB: 1985     Date of Admission:  2/10/2021  Date of Discharge:  4/7/2021  Admitting Physician:  Zhang Hargrove MD  Discharge Physician:  WILLIAN Sánchez CNP (Contact: [phone])         Event Leading to Hospitalization:      Misty Parham is a 35-year-old female admitted to [address] on 2/10/2021.  She was admitted under ongoing civil commitment MI with [facility] in [facility] after taking 4-5 tabs of Unisom, 4-5 tabs of Ibuprofen and 4-5 tabs of Tylenol twice daily for 3 months in an attempt to commit suicide.  She was experiencing auditory hallucinations of Satan's voice commanding her to commit suicide.  Upon admission, provisional discharge was not revoked.  She had stopped taking medications several weeks prior to admission.     From H&P 2/11/2021:    This patient is a 35 year old female with history of depression with psychotic features, anxiety and borderline personality disorder who presented to ED with psychosis including CAH and suicidal ideaiton and recent attempt in context of medication non-adherence for several weeks. Patient has long standing history of admission and decompensation when out of structured treatment setting, she reports she stopped taking medications a few weeks ago and has been experiencing an increase in hearing Satan and he is telling her to harm herself, threatening her and she has been having suicidal ideation. She states she has been taking small doses of OTC medications in attempts to harm herself PTA. She was medically cleared in ED including contact with poison control and given time of last use determined not to be at risk for further sequelae. She feels safe on the unit as she reports no access to means to hurt self, she is under a current commitment, she is under a 72 hour hold and she is unsure if she can continue in the hospital  "voluntarily and will think about this further and approach staff to sign in if willing. She is agreeable to restarting medications as they were helpful in the past but requests olanzapine not be restarted as this caused significant weight gain. Team will coordinate with pts CM re: commitment status and treatment plan.      Inpatient psychiatric hospitalization is warranted at this time for safety, stabilization, and possible adjustment in medications.    \"Satan has gotten louder\"    Misty is a 35 year old female with history of depression with psychotic features, anxiety and borderline personality disorder who presented to ED with psychosis including CAH and suicidal ideaiton in context of medication non-adherence for several weeks.     Per ED providers note: Misty Parham is a 35 year old female with history of depressive disorder, borderline personality disorder, and suicidal ideation who presents with suicidal ideation. EMS reports the patient is on a civil commitment to [facility] until the end of May of 2021 with a [facility] and ECT. However, the patient has not been to see her psychiatrist or therapist since September of 2020 and has not been taking her medications.      The patient reports that she has been intentionally taking Unisome, Ibuprofen, and Tylenol in an attempt to kill herself. She reports she has taken 4-5 Unisome, 4-5 Ibuprofen, and 4-5 Tylenol twice daily for the past 3 months. She states she is hearing Satan telling her to try and kill herself. She stopped doing this on 2/6 because she started to feel unwell with nausea, vomiting, chills, diaphoresis, and generalized adbominal pain. She also endorses dizziness, but denies recent falls. She denies alcohol or drug use. She has no homicidal ideation. She states she is taking her prescriptions as directed.      Upon interview pt reports she was doing okay since discharging form this unit back in the summer, but started to feel more down the " "past 3 months. Is unable to determine prompting events. States that Channing has gotten louder, never goes away but when she is doing well he is quieter. He will state that it is time for her to time and he has his hands wrapped around her and dragging her to hell.  Denies other psychosis symptoms. She has started to have suicidal ideation and states \"I just want to die\", has beenthinking about ways to hurt self, overdose or shooting self. Reports she stopped taking medication a couple weeks ago. She wanted to die.     Has been overdosing \"on stuff so body can't regulate temperature\". Taking Tylenol PM, Ibuprofen PM and Unisom. Last took these Friday and Saturday got sick and hasn't taken any more since. No other physical health concerns. Reports she is feeling tired today. Sleep has been poor and was able to sleep for first time in a couple weeks. Energy has been down, poor motivation. Feeling guilty, negative thoughts. Hopeless and helpless. No thoughts to hurt others. Some SIB urges. Denies substance use. She is agreeable ot restarting medications with exception of olanzapine as she stated it caused weight gain and she is agreeable to work ot optimize quetiapine. She was admitted on [facility] and is under commitment, she is unsure on if she agrees to stay in the hospital until she is feeling better, she will think about this and sign in voluntary if decides she is willing to stay.      Depression: see HPI  Trixie:   Reports: none  Denies: sleeplessness, increased goal-directed activities, abrupt increase in energy pressured speech  Psychosis:   Reports:CAH  Denies: visual hallucinations, paranoia, thought insertion/broadcasting, ideas of reference  Anxiety:   Reports: worries that are difficult to control  Denies: panic attacks  PTSD:   Reports: none  Denies: re-experiencing past trauma, nightmares, increased arousal, avoidance of traumatic stimuli, impaired function.  OCD:   Reports: none  Denies: obsessions, checking, " symmetry, cleaning, skin picking.  ED:   Reports: none  Denies: restriction, binging, purging.    See full admission note by Dr. Mcfadden 2/11/2021 for details.           Diagnoses:     Borderline personality disorder  Major depressive disorder, recurrent, severe with psychotic features  Generalized anxiety disorder  Sepsis secondary to facial cellulitis         Labs:      Ref. Range 2/10/2021 12:20 2/10/2021 12:35 2/10/2021 16:39 2/11/2021 08:10   Sodium Latest Ref Range: 133 - 144 mmol/L 138      Potassium Latest Ref Range: 3.4 - 5.3 mmol/L 3.9      Chloride Latest Ref Range: 94 - 109 mmol/L 108      Carbon Dioxide Latest Ref Range: 20 - 32 mmol/L 24      Urea Nitrogen Latest Ref Range: 7 - 30 mg/dL 12      Creatinine Latest Ref Range: 0.52 - 1.04 mg/dL 0.73      GFR Estimate Latest Ref Range: >60 mL/min/1.73_m2 >90      GFR Estimate If Black Latest Ref Range: >60 mL/min/1.73_m2 >90      Calcium Latest Ref Range: 8.5 - 10.1 mg/dL 9.8      Anion Gap Latest Ref Range: 3 - 14 mmol/L 6      Albumin Latest Ref Range: 3.4 - 5.0 g/dL 4.0      Protein Total Latest Ref Range: 6.8 - 8.8 g/dL 8.1      Bilirubin Total Latest Ref Range: 0.2 - 1.3 mg/dL 0.3      Alkaline Phosphatase Latest Ref Range: 40 - 150 U/L 80      ALT Latest Ref Range: 0 - 50 U/L 26      AST Latest Ref Range: 0 - 45 U/L 22      Cholesterol Latest Ref Range: <200 mg/dL    164   HCG Qual Urine Latest Ref Range: NEG^Negative   Negative     HDL Cholesterol Latest Ref Range: >49 mg/dL    44 (L)   LDL Cholesterol Calculated Latest Ref Range: <100 mg/dL    94   Non HDL Cholesterol Latest Ref Range: <130 mg/dL    120   Triglycerides Latest Ref Range: <150 mg/dL    130   TSH Latest Ref Range: 0.40 - 4.00 mU/L    1.31   Glucose Latest Ref Range: 70 - 99 mg/dL 107 (H)      WBC Latest Ref Range: 4.0 - 11.0 10e9/L 11.5 (H)      Hemoglobin Latest Ref Range: 11.7 - 15.7 g/dL 14.3      Hematocrit Latest Ref Range: 35.0 - 47.0 % 44.6      Platelet Count Latest Ref Range:  150 - 450 10e9/L 381      RBC Count Latest Ref Range: 3.8 - 5.2 10e12/L 4.99      MCV Latest Ref Range: 78 - 100 fl 89      MCH Latest Ref Range: 26.5 - 33.0 pg 28.7      MCHC Latest Ref Range: 31.5 - 36.5 g/dL 32.1      RDW Latest Ref Range: 10.0 - 15.0 % 15.2 (H)      Diff Method Unknown Automated Method      % Neutrophils Latest Units: % 81.8      % Lymphocytes Latest Units: % 12.5      % Monocytes Latest Units: % 5.1      % Eosinophils Latest Units: % 0.0      % Basophils Latest Units: % 0.3      % Immature Granulocytes Latest Units: % 0.3      Nucleated RBCs Latest Ref Range: 0 /100 0      Absolute Neutrophil Latest Ref Range: 1.6 - 8.3 10e9/L 9.4 (H)      Absolute Lymphocytes Latest Ref Range: 0.8 - 5.3 10e9/L 1.4      Absolute Monocytes Latest Ref Range: 0.0 - 1.3 10e9/L 0.6      Absolute Eosinophils Latest Ref Range: 0.0 - 0.7 10e9/L 0.0      Absolute Basophils Latest Ref Range: 0.0 - 0.2 10e9/L 0.0      Abs Immature Granulocytes Latest Ref Range: 0 - 0.4 10e9/L 0.0      Absolute Nucleated RBC Unknown 0.0      INR Latest Ref Range: 0.86 - 1.14  1.05      SARS-CoV-2 Virus Specimen Source Unknown   Nasopharyngeal    SARS-CoV-2 PCR Result Unknown   NEGATIVE    Acetaminophen Level Latest Units: mg/L <2      Ethanol g/dL Latest Ref Range: <0.01 g/dL <0.01      Salicylate Level Latest Units: mg/dL <2      Amphetamine Qual Urine Latest Ref Range: NEG^Negative   Negative     Cocaine Qual Urine Latest Ref Range: NEG^Negative   Negative     Opiates Qualitative Urine Latest Ref Range: NEG^Negative   Negative     Cannabinoids Qual Urine Latest Ref Range: NEG^Negative   Negative     Barbiturates Qual Urine Latest Ref Range: NEG^Negative   Negative     Pcp Qual Urine Latest Ref Range: NEG^Negative   Negative     Benzodiazepine Qual Urine Latest Ref Range: NEG^Negative   Negative        Ref. Range 2/12/2021 07:31 2/22/2021 11:00 2/25/2021 14:00 3/5/2021 10:50   WBC Latest Ref Range: 4.0 - 11.0 10e9/L 8.2      Hemoglobin  Latest Ref Range: 11.7 - 15.7 g/dL 12.9      Hematocrit Latest Ref Range: 35.0 - 47.0 % 40.1      Platelet Count Latest Ref Range: 150 - 450 10e9/L 333      RBC Count Latest Ref Range: 3.8 - 5.2 10e12/L 4.41      MCV Latest Ref Range: 78 - 100 fl 91      MCH Latest Ref Range: 26.5 - 33.0 pg 29.3      MCHC Latest Ref Range: 31.5 - 36.5 g/dL 32.2      RDW Latest Ref Range: 10.0 - 15.0 % 14.7      Diff Method Unknown Automated Method      % Neutrophils Latest Units: % 61.8      % Lymphocytes Latest Units: % 28.0      % Monocytes Latest Units: % 8.8      % Eosinophils Latest Units: % 0.0      % Basophils Latest Units: % 1.0      % Immature Granulocytes Latest Units: % 0.4      Nucleated RBCs Latest Ref Range: 0 /100 0      Absolute Neutrophil Latest Ref Range: 1.6 - 8.3 10e9/L 5.0      Absolute Lymphocytes Latest Ref Range: 0.8 - 5.3 10e9/L 2.3      Absolute Monocytes Latest Ref Range: 0.0 - 1.3 10e9/L 0.7      Absolute Eosinophils Latest Ref Range: 0.0 - 0.7 10e9/L 0.0      Absolute Basophils Latest Ref Range: 0.0 - 0.2 10e9/L 0.1      Abs Immature Granulocytes Latest Ref Range: 0 - 0.4 10e9/L 0.0      Absolute Nucleated RBC Unknown 0.0      SARS-CoV-2 Virus Specimen Source Unknown  Nasopharyngeal Nasopharyngeal Nasopharyngeal   SARS-CoV-2 PCR Result Unknown  NEGATIVE NEGATIVE NEGATIVE        Ref. Range 3/12/2021 17:30 3/22/2021 12:07 3/29/2021 13:28 4/6/2021 11:20   SARS-CoV-2 Virus Specimen Source Unknown Nasopharyngeal Nasal Nasopharyngeal Nasal   SARS-CoV-2 PCR Result Unknown NEGATIVE NEGATIVE NEGATIVE NEGATIVE        Ref. Range 4/7/2021 08:27 4/7/2021 08:27 4/7/2021 08:28   Sodium Latest Ref Range: 133 - 144 mmol/L   134   Potassium Latest Ref Range: 3.4 - 5.3 mmol/L   3.7   Chloride Latest Ref Range: 94 - 109 mmol/L   104   Carbon Dioxide Latest Ref Range: 20 - 32 mmol/L   22   Urea Nitrogen Latest Ref Range: 7 - 30 mg/dL   11   Creatinine Latest Ref Range: 0.52 - 1.04 mg/dL   0.75   GFR Estimate Latest Ref  Range: >60 mL/min/1.73_m2   >90   GFR Estimate If Black Latest Ref Range: >60 mL/min/1.73_m2   >90   Calcium Latest Ref Range: 8.5 - 10.1 mg/dL   8.9   Anion Gap Latest Ref Range: 3 - 14 mmol/L   8   CRP Inflammation Latest Ref Range: 0.0 - 8.0 mg/L   69.0 (H)   Glucose Latest Ref Range: 70 - 99 mg/dL   109 (H)   WBC Latest Ref Range: 4.0 - 11.0 10e9/L   14.5 (H)   Hemoglobin Latest Ref Range: 11.7 - 15.7 g/dL   12.3   Hematocrit Latest Ref Range: 35.0 - 47.0 %   37.2   Platelet Count Latest Ref Range: 150 - 450 10e9/L   304   RBC Count Latest Ref Range: 3.8 - 5.2 10e12/L   4.23   MCV Latest Ref Range: 78 - 100 fl   88   MCH Latest Ref Range: 26.5 - 33.0 pg   29.1   MCHC Latest Ref Range: 31.5 - 36.5 g/dL   33.1   RDW Latest Ref Range: 10.0 - 15.0 %   13.9   BLOOD CULTURE Unknown Rpt Rpt    Culture Micro Unknown PENDING PENDING    Specimen Description Unknown Blood Right Arm Blood Left Arm             Consults:     Consultation during this admission received from internal medicine 4/7/2021:    Assessment & Plan     Misty Parham is a 35 year old female with a history of depression, anxiety, borderline personality disorder who was admitted to inpatient behavioral health with psychosis.      Sepsis 2/2 facial cellulitis: Rapid onset of facial edema and erythema within the last 12 hours. Also with leukocytosis and fever. Concern for developing abscess.    - Will admit to Internal Medicine service for IV antibiotics (Unasyn, vancomycin)   - Blood cultures x2 pending   - Check MRSA nasal swab   - Continue to closely monitor    - Notified Hina Morris CNP     Addendum: Will place PIV and start IV Unasyn, vancomycin on behavioral health unit prior to transferring.      Patient's care was discussed with Dr. Rivero.          Hospital Course:     Misty Parahm was admitted to [facility] with attending Zhang Hargrove MD, under the direct care of Hina Morris NP as a voluntary patient and under an ongoing civil  commitment MI with [facility] (Zyprexa, Seroquel, Latuda, Abilify) and [facility] in [facility].  A 72-hour hold was later placed and her provisional discharge was revoked after she requested discharge while actively suicidal.  Her commitment has already been extended and is due to  2021, so a new petition for commitment was filed on 2021 with the same requested [facility] meds.  The patient was placed under status 15 (15 minute checks) to ensure patient safety.     Seroquel XR was restarted and changed from evening to BID dosing due to her inconsistent adherence resulting in the necessity to administer IM Zyprexa per [facility].  Of note, she stated that Seroquel XR was more beneficial and did not increase her appetite as much as Zyprexa, yet periodically continued to refuse Seroquel XR, knowing she would receiving an injection of Zyprexa if she did so.  Effexor ER was restarted and increased from 150 mg to 225 mg.  Rozerem 8 mg at HS was restarted.  Protriptyline 10 mg in the evening was restarted.  A Lamictal titration was restarted but was slow to progress given her inconsistent adherence; she is due to increase the dose from 50 mg daily to 100 mg daily on .  Wellbutrin XL was initiated and titrated to 300 mg.  PRN Klonopin was restarted.  PRNs of Seroquel, Hydroxyzine, Zyprexa and Trazodone were initiated.  She typically only utilized PRNs of Klonopin and Hydroxyzine.  She reported constipation as a side effect but otherwise tolerated medications well.  She began refusing medications on  and took them inconsistently until 3/20.  She has been fully adherent since 3/20.    She had ECT in 2020 - 2020 but had cognitive impairment with a MoCA score of 9/30 and improvements in symptoms noted only on treatment days, so the treatment team elected not to pursue ECT as a treatment modality.    She was spending nearly all of her time in her room and showed little motivation for treatment, so a room  lock out was initiated as it has been during previous hospitalizations, from 9:30 AM - 12 PM, 1 PM - 3 PM and 5 PM to 9 PM.  She spent some time in the milieu reading and watching TV but did not attend any groups as she stated she had been to them during previous hospitalizations and did not find them beneficial.  She intermittently superficially scratched her forearms as a form of self-injury without suicidal intent.  A finger foods diet was ordered.  Staff regularly conducted room searches to remove potentially harmful objects from her room, as she often hid foil juice tops and plasticware.  On 3/25 she reported she had attempted to strangle herself with linens on 3/23 and 3/24, and linens were removed from her room.  As of 4/6 she contracted for safety with her linens.  She remained in a room directly across from the desk throughout her hospitalization.  Individual therapy was initiated shortly before her discharge to medical and she stated that it was helpful.    Her mother indicated that the patient is not welcome to reside with her.  Historically she did not put forth efforts to engage in treatment during IRTS placement.  [facility] funding and adult foster care placement was pursued.  [facility] interview was completed 3/16. She had an interview with [facility] 4/1/21 and has [facility] funding in place.      The patient's symptoms of depression changed minimally during her hospitalization.  She continued to report suicidal ideation, though was able to contract for safety in the hospital.  She continued to report feelings of hopelessness.  Auditory hallucinations of Satan commanding her not to take meds, to commit suicide, and making derogatory comments were fairly constant but lower in volume.  Sleep was fair to good.  Appetite was good.  Anxiety was intermittently high.    Misty Parham was transferred to a medical unit due to sepsis secondary to facial cellulitis.          Discharge Medications:      Misty Parham  Linda   Home Medication Instructions RAFAEL:[id number]    Printed on:04/07/21 2101   Medication Information                      acetaminophen (TYLENOL) 325 MG tablet  Take 2 tablets (650 mg) by mouth every 4 hours as needed for mild pain (to moderate pain)             buPROPion (WELLBUTRIN XL) 300 MG 24 hr tablet  Take 1 tablet (300 mg) by mouth daily             clonazePAM (KLONOPIN) 0.5 MG tablet  Take 0.5 mg by mouth 2 times daily as needed for anxiety             hydrOXYzine (ATARAX) 25 MG tablet  Take 1 tablet (25 mg) by mouth every 4 hours as needed for anxiety             lamoTRIgine (LAMICTAL) 100 MG tablet  Take 1 tablet (100 mg) by mouth every morning beginning 4/9/2021.             lamoTRIgine (LAMICTAL) 25 MG tablet  Take 2 tablets (50 mg) by mouth once for 1 dose on 4/8/2020 at 8 AM.             OLANZapine (ZYPREXA) 10 MG tablet  Take 1 tablet (10 mg) by mouth 3 times daily as needed (agitation associated with psychosis)             OLANZapine (ZYPREXA) injection  Inject 10 mg into the muscle 3 times daily as needed for agitation (associated with psychosis)             OLANZapine (ZYPREXA) injection  Inject 10 mg into the muscle 2 times daily.  Jarvised.  Administer IM Zyprexa if she refuses PO Seroquel XR.             polyethylene glycol (MIRALAX) 17 g packet  Take 17 g by mouth daily             protriptyline (VIVACTIL) 10 MG tablet  Take 1 tablet (10 mg) by mouth At Bedtime             QUEtiapine (SEROQUEL) 25 MG tablet  Take 1 tablet (25 mg) by mouth 3 times daily as needed (psychosis)             QUEtiapine ER (SEROQUEL XR) 300 MG 24 hr tablet  Take 1 tablet (300 mg) by mouth 2 times daily.  Jarvised.  Administer IM Zyprexa if she refuses PO Seroquel XR.             ramelteon (ROZEREM) 8 MG tablet  Take 1 tablet (8 mg) by mouth At Bedtime             rOPINIRole (REQUIP) 1 MG tablet  Take 1 tablet (1 mg) by mouth At Bedtime             venlafaxine (EFFEXOR-XR) 75 MG 24 hr capsule  Take 3 capsules (225  "mg) by mouth daily (with breakfast)                      Psychiatric Examination:     Appearance:  alert, adequate grooming/hygiene   Attitude:  cooperative   Eye Contact:  fair  Mood: depressed, anxious  Affect:  intensity is blunted  Speech:  quiet, soft, monotonous   Language: fluent and intact in English  Psychomotor, Gait, Musculoskeletal:  no evidence of tardive dyskinesia, dystonia, or tics  Thought Process:  goal oriented, linear, less than logical  Associations:  no loose associations  Thought Content:  endorses suicidal ideation, able to contract for safety with linens in her room, denies HI, denies visual hallucinations, endorses urges to cut self as a form of self-injury but not with suicidal intent  Insight:  limited  Judgement:  limited  Oriented to:  month/year, time, person, and place   Attention Span and Concentration:  fair  Recent and Remote Memory:  intact  Fund of Knowledge:  appropriate    BP 96/63 (BP Location: Right arm)   Pulse 111   Temp 100.6  F (38.1  C) (Oral)   Resp 16   Ht 1.651 m (5' 5\")   Wt 116.2 kg (256 lb 1.6 oz)   SpO2 95%   BMI 42.62 kg/m             Discharge Plan:     She was transferred to the medical unit.  Recommend suicide and self-injury precautions.  Recommend a sitter.  If a finger foods diet is not implemented, her sitter should monitor closely for her attempting to hide plasticware and juice carton foil tops with the intent to harming herself.  Recommend a psychiatry consult.  The medical unit  will need to coordinate recommitment hearings with [facility].  Plan to return to a psychiatric unit when stable, preferably [facility].  Strongly recommend that when she is transferred back to the psychiatric unit, she be placed in a room across from the desk for increased monitoring due to safety considerations.  Long term plan for adult foster care placement.      Outpatient Providers:    PCP - Kathleen Spencer - [facility] - Dr. " [facility] [place] [facility]  [address]     [place]  Ph:  [phone]    Fx:  [phone]     Therapy - Anderson Moss, [facility]  [address]     [place]  Ph:  [phone]    Fx:  [phone]    [facility] :  Brinda Machuca ([phone]/fax [phone])           Attestation:  The patient has been seen and evaluated by me, WILLIAN Sánchez CNP   Discharge time > 30 minutes      Clinical Global Impressions  First:  Considering your total clinical experience with this particular patient population, how severe are the patient's symptoms at this time?: 7 (02/11/21 1842)  Compared to the patient's condition at the START of treatment, this patient's condition is: 4 (02/11/21 1842)  Most recent:  Considering your total clinical experience with this particular patient population, how severe are the patient's symptoms at this time?: 7 (04/07/21 1109)  Compared to the patient's condition at the START of treatment, this patient's condition is: 4 (04/07/21 1109)

## 2021-04-07 NOTE — PHARMACY-VANCOMYCIN DOSING SERVICE
Pharmacy Vancomycin Initial Note  Date of Service 2021  Patient's  1985  35 year old, female    Indication: Sepsis and Skin and Soft Tissue Infection    Current estimated CrCl = Estimated Creatinine Clearance: 130.8 mL/min (based on SCr of 0.75 mg/dL).    Creatinine for last 3 days  2021:  8:28 AM Creatinine 0.75 mg/dL    Recent Vancomycin Level(s) for last 3 days  No results found for requested labs within last 72 hours.      Vancomycin IV Administrations (past 72 hours)      No vancomycin orders with administrations in past 72 hours.              Nephrotoxins and other renal medications (From now, onward)    Start     Dose/Rate Route Frequency Ordered Stop    21 1200  vancomycin (VANCOCIN) 1,750 mg in sodium chloride 0.9 % 500 mL intermittent infusion      1,750 mg  over 2 Hours Intravenous EVERY 12 HOURS 21 1023      21 1030  ampicillin-sulbactam (UNASYN) 3 g in sodium chloride 0.9 % 100 mL intermittent infusion      3 g  over 1 Hours Intravenous EVERY 6 HOURS 21 1008            Contrast Orders - past 72 hours (72h ago, onward)    None         Plan:  1. Start vancomycin 1750 mg IV q12h (15.1 mg/kg/dose).   2. Goal Trough Level: 10-15 mg/L   3. Pharmacy will check trough levels as appropriate in 1-3 Days.    4. Serum creatinine levels will be ordered a minimum of twice weekly.    5. Hayes method utilized to dose vancomycin therapy: Method 2    Finn Holley, PharmD, BCPS  [facility] Building: [facility] *[id number]

## 2021-04-07 NOTE — PROGRESS NOTES
Pt. Has a fever of 102.8 with redness on the right side of her face.  Pt. reports that the side of her face is painful.  Psychiatric provider notified.  Internal medicine consult ordered, labs ordered, ice pack given, and tylenol.  Pt. Will continue to be monitored.

## 2021-04-07 NOTE — PLAN OF CARE
"Pt has had somewhat of a challenging evening. She napped until dinner, then went straight back to bed. She attributed isolating to agoraphobia. She responded minimally during 1:1 and presented with a very withdrawn, flat affect. She then admitted to, once again, using a thin foil peel back top of a juice folded many times to engage in light SIB on her forearms. Scratches are superficial, and she admits to relief from this. She is unable to use DBT skills at the moment because \"I just don't care.\" She was later visible in milieu with this writer's encouragement, and passively watched a movie for distraction. She admits to  urging SIB too. She contracts for safety overnight, in light of just earning her bed linens back, and wants to keep them. She agrees to seek out staff if she has increased SI with intention to act. Pt seems resigned with her care and her legal status.       Problem: Suicidal Behavior  Goal: Suicidal Behavior is Absent or Managed  Outcome: No Change  Flowsheets (Taken 4/5/2021 2146 by Randall Armstrong RN)  Mutually Determined Action Steps (Suicidal Behavior Absent/Managed):   verbalizes safety check rationale   shares suicidal thoughts     "

## 2021-04-07 NOTE — PLAN OF CARE
Pt. willing to engage with staff.  Pt. states that the auditory hallucination that she hears is diminished currently.  Pt. currently denies suicidal ideation and self-injurious behavior.  The right side of the pt.'s face continues to appear red.  Pt. will receive IV antibiotics, will also be placed on a SIO, and be transferred to a medical unit when bed available.  Pt.'s thoughts are reality based and clear.  Pt.'s physical status will be monitored closely.

## 2021-04-07 NOTE — PLAN OF CARE
Patient arrived to floor from psych at 1400. IV was placed by RN flygladis and patient went down for CT.  Elevated temp - tylenol administered, tachycardia. BP stable. Patient denies pain. On room air. A&Ox4. LS clear. Pt denies SOB and chest pain. Pt ambulating independently. BS active and audible in all quadrants. Pt passing gas. Voiding without difficulty. Healing scratches on forearms. Redness on R side of face - cold compress applied. Regular diet, patient did not eat lunch. Pt denies numbness/tingling. PIV infusing. Continue to monitor S/S of infection and SI. Pt able to make needs known, call light within reach, sitter at bedside. Patient has very flat affect and reports some anxiety from being on a new unit - PRN administered X1.

## 2021-04-08 LAB
ANION GAP SERPL CALCULATED.3IONS-SCNC: 8 MMOL/L (ref 3–14)
BASOPHILS # BLD AUTO: 0 10E9/L (ref 0–0.2)
BASOPHILS NFR BLD AUTO: 0.3 %
BUN SERPL-MCNC: 12 MG/DL (ref 7–30)
CALCIUM SERPL-MCNC: 7.9 MG/DL (ref 8.5–10.1)
CHLORIDE SERPL-SCNC: 109 MMOL/L (ref 94–109)
CO2 SERPL-SCNC: 21 MMOL/L (ref 20–32)
CREAT SERPL-MCNC: 0.75 MG/DL (ref 0.52–1.04)
CRP SERPL-MCNC: 220 MG/L (ref 0–8)
DIFFERENTIAL METHOD BLD: ABNORMAL
EOSINOPHIL # BLD AUTO: 0 10E9/L (ref 0–0.7)
EOSINOPHIL NFR BLD AUTO: 0.3 %
ERYTHROCYTE [DISTWIDTH] IN BLOOD BY AUTOMATED COUNT: 14.1 % (ref 10–15)
GFR SERPL CREATININE-BSD FRML MDRD: >90 ML/MIN/{1.73_M2}
GLUCOSE SERPL-MCNC: 104 MG/DL (ref 70–99)
HCT VFR BLD AUTO: 31.3 % (ref 35–47)
HGB BLD-MCNC: 10.3 G/DL (ref 11.7–15.7)
IMM GRANULOCYTES # BLD: 0.1 10E9/L (ref 0–0.4)
IMM GRANULOCYTES NFR BLD: 0.7 %
LACTATE BLD-SCNC: 1 MMOL/L (ref 0.7–2)
LYMPHOCYTES # BLD AUTO: 0.9 10E9/L (ref 0.8–5.3)
LYMPHOCYTES NFR BLD AUTO: 6.8 %
MCH RBC QN AUTO: 28.9 PG (ref 26.5–33)
MCHC RBC AUTO-ENTMCNC: 32.9 G/DL (ref 31.5–36.5)
MCV RBC AUTO: 88 FL (ref 78–100)
MONOCYTES # BLD AUTO: 0.6 10E9/L (ref 0–1.3)
MONOCYTES NFR BLD AUTO: 4.8 %
NEUTROPHILS # BLD AUTO: 11.7 10E9/L (ref 1.6–8.3)
NEUTROPHILS NFR BLD AUTO: 87.1 %
NRBC # BLD AUTO: 0 10*3/UL
NRBC BLD AUTO-RTO: 0 /100
PLATELET # BLD AUTO: 206 10E9/L (ref 150–450)
POTASSIUM SERPL-SCNC: 3.6 MMOL/L (ref 3.4–5.3)
RBC # BLD AUTO: 3.57 10E12/L (ref 3.8–5.2)
SODIUM SERPL-SCNC: 138 MMOL/L (ref 133–144)
VANCOMYCIN SERPL-MCNC: 19.7 MG/L
WBC # BLD AUTO: 13.5 10E9/L (ref 4–11)

## 2021-04-08 PROCEDURE — 258N000003 HC RX IP 258 OP 636: Performed by: INTERNAL MEDICINE

## 2021-04-08 PROCEDURE — 99232 SBSQ HOSP IP/OBS MODERATE 35: CPT | Performed by: INTERNAL MEDICINE

## 2021-04-08 PROCEDURE — 250N000011 HC RX IP 250 OP 636: Performed by: INTERNAL MEDICINE

## 2021-04-08 PROCEDURE — 83605 ASSAY OF LACTIC ACID: CPT | Performed by: INTERNAL MEDICINE

## 2021-04-08 PROCEDURE — 120N000002 HC R&B MED SURG/OB UMMC

## 2021-04-08 PROCEDURE — 86140 C-REACTIVE PROTEIN: CPT | Performed by: INTERNAL MEDICINE

## 2021-04-08 PROCEDURE — 80202 ASSAY OF VANCOMYCIN: CPT | Performed by: INTERNAL MEDICINE

## 2021-04-08 PROCEDURE — 80048 BASIC METABOLIC PNL TOTAL CA: CPT | Performed by: INTERNAL MEDICINE

## 2021-04-08 PROCEDURE — 99232 SBSQ HOSP IP/OBS MODERATE 35: CPT | Mod: 25 | Performed by: NURSE PRACTITIONER

## 2021-04-08 PROCEDURE — 250N000013 HC RX MED GY IP 250 OP 250 PS 637: Performed by: PHYSICIAN ASSISTANT

## 2021-04-08 PROCEDURE — 36415 COLL VENOUS BLD VENIPUNCTURE: CPT | Performed by: INTERNAL MEDICINE

## 2021-04-08 PROCEDURE — 250N000013 HC RX MED GY IP 250 OP 250 PS 637: Performed by: INTERNAL MEDICINE

## 2021-04-08 PROCEDURE — 85025 COMPLETE CBC W/AUTO DIFF WBC: CPT | Performed by: INTERNAL MEDICINE

## 2021-04-08 PROCEDURE — 258N000003 HC RX IP 258 OP 636

## 2021-04-08 PROCEDURE — 250N000011 HC RX IP 250 OP 636: Performed by: PHYSICIAN ASSISTANT

## 2021-04-08 RX ORDER — SODIUM CHLORIDE 9 MG/ML
INJECTION, SOLUTION INTRAVENOUS
Status: DISPENSED
Start: 2021-04-08 | End: 2021-04-09

## 2021-04-08 RX ORDER — AMPICILLIN AND SULBACTAM 2; 1 G/1; G/1
3 INJECTION, POWDER, FOR SOLUTION INTRAMUSCULAR; INTRAVENOUS EVERY 6 HOURS
Status: DISCONTINUED | OUTPATIENT
Start: 2021-04-08 | End: 2021-04-08

## 2021-04-08 RX ORDER — SODIUM CHLORIDE 9 MG/ML
INJECTION, SOLUTION INTRAVENOUS
Status: COMPLETED
Start: 2021-04-08 | End: 2021-04-08

## 2021-04-08 RX ORDER — CEFAZOLIN SODIUM 1 G/50ML
1750 SOLUTION INTRAVENOUS EVERY 12 HOURS
Status: DISCONTINUED | OUTPATIENT
Start: 2021-04-09 | End: 2021-04-09

## 2021-04-08 RX ADMIN — POLYETHYLENE GLYCOL 3350 17 G: 17 POWDER, FOR SOLUTION ORAL at 08:29

## 2021-04-08 RX ADMIN — AMPICILLIN SODIUM AND SULBACTAM SODIUM 3 G: 2; 1 INJECTION, POWDER, FOR SOLUTION INTRAMUSCULAR; INTRAVENOUS at 11:47

## 2021-04-08 RX ADMIN — VENLAFAXINE HYDROCHLORIDE 225 MG: 75 CAPSULE, EXTENDED RELEASE ORAL at 08:29

## 2021-04-08 RX ADMIN — BUPROPION HYDROCHLORIDE 300 MG: 300 TABLET, FILM COATED, EXTENDED RELEASE ORAL at 08:29

## 2021-04-08 RX ADMIN — LAMOTRIGINE 50 MG: 25 TABLET ORAL at 12:54

## 2021-04-08 RX ADMIN — QUETIAPINE FUMARATE 300 MG: 300 TABLET, EXTENDED RELEASE ORAL at 08:29

## 2021-04-08 RX ADMIN — PROTRIPTYLINE HYDROCHLORIDE 10 MG: 10 TABLET ORAL at 22:05

## 2021-04-08 RX ADMIN — SODIUM CHLORIDE 500 ML: 9 INJECTION, SOLUTION INTRAVENOUS at 16:06

## 2021-04-08 RX ADMIN — AMPICILLIN SODIUM AND SULBACTAM SODIUM 3 G: 2; 1 INJECTION, POWDER, FOR SOLUTION INTRAMUSCULAR; INTRAVENOUS at 23:01

## 2021-04-08 RX ADMIN — AMPICILLIN SODIUM AND SULBACTAM SODIUM 3 G: 2; 1 INJECTION, POWDER, FOR SOLUTION INTRAMUSCULAR; INTRAVENOUS at 02:25

## 2021-04-08 RX ADMIN — ROPINIROLE HYDROCHLORIDE 1 MG: 1 TABLET, FILM COATED ORAL at 22:05

## 2021-04-08 RX ADMIN — AMPICILLIN SODIUM AND SULBACTAM SODIUM 3 G: 2; 1 INJECTION, POWDER, FOR SOLUTION INTRAMUSCULAR; INTRAVENOUS at 17:28

## 2021-04-08 RX ADMIN — RAMELTEON 8 MG: 8 TABLET ORAL at 22:05

## 2021-04-08 RX ADMIN — VANCOMYCIN HYDROCHLORIDE 1500 MG: 10 INJECTION, POWDER, LYOPHILIZED, FOR SOLUTION INTRAVENOUS at 00:05

## 2021-04-08 RX ADMIN — IBUPROFEN 600 MG: 600 TABLET, FILM COATED ORAL at 11:46

## 2021-04-08 RX ADMIN — SODIUM CHLORIDE: 9 INJECTION, SOLUTION INTRAVENOUS at 05:00

## 2021-04-08 RX ADMIN — QUETIAPINE FUMARATE 300 MG: 300 TABLET, EXTENDED RELEASE ORAL at 22:05

## 2021-04-08 RX ADMIN — VANCOMYCIN HYDROCHLORIDE 1500 MG: 10 INJECTION, POWDER, LYOPHILIZED, FOR SOLUTION INTRAVENOUS at 08:40

## 2021-04-08 RX ADMIN — VANCOMYCIN HYDROCHLORIDE 1500 MG: 10 INJECTION, POWDER, LYOPHILIZED, FOR SOLUTION INTRAVENOUS at 18:28

## 2021-04-08 NOTE — PLAN OF CARE
"      VS: /58 (BP Location: Left arm)   Pulse 117   Temp 100  F (37.8  C) (Oral)   Resp 14   SpO2 96%   Room air   Output: Voiding spontaneously without difficulty, LBM 4/6   Lungs: CTA   Activity: Up IND   Skin: Pt frequently scratches herself on left and right arms. (Hx of scratching since 8th grade)   Pain:   C/o headache earlier, given ibuprofen with relief  Pt stated her face cellulitis is \"ok\"    Neuro/CMS:   A&Ox4, denies numbness or tingling   Dressing(s):   none   Diet:   Reg- tolerating small amounts of food.    LDA:   L PIV infusing   Equipment:   IV pole   Plan:   Continue to monitor and follow plan of care. Able to make needs known and call light within reach. 1:1 @ bedside for SI    Additional Info:   Nurse passed along in report that patient will try to be \"sneaky\" about suicide attempts so be aware of what is in patients room.        "

## 2021-04-08 NOTE — CONSULTS
"    Psychiatry Consultation; Follow up          Reason for Consult, requesting source:    Suicidal ideation. Transferred from [facility]  Requesting source: Shiv Rivero          Interim history:    Ms. Hansa Parham is a 35-year-old female who was admitted to  as a transfer from [facility] inpatient psychiatry for IV antibiotics in the setting of facial cellulitis. She was initially admitted to psychiatry on 2/10/21. She is on a full commitment with [facility] which is set to  in May. A  petition for recommitment was submitted on . Psychiatric history significant for borderline personality disorder, major depressive disorder, and generalized anxiety disorder. Psychiatry is consulted to follow patient while on medicine.    On interview today, patient endorses restless sleep overnight. C/o facial pain related to her facial cellulitis, states it hurts more when she moves. She continues to endorse auditory hallucinations, hearing the voice of satan. Reports that this voice will command her to harm herself. She denies any current thoughts, plans, or intent to harm herself. Reports she last scratched on her arm while in inpatient psychiatry - per previous documentation, it appears she used a small piece of aluminum foil to scratch superficially. The area on her arm is a bit reddened. She denies any self-injurious behavior here on medicine. She is tolerating her medications. Reports a \"low\" mood. Anxiety is a bit high, rated at 8/10. Informed her that we would be increasing her lamotrigine to 100 mg daily beginning tomorrow. She was in agreement with this plan.          Medications:     Medications Prior to Admission   Medication Sig Dispense Refill Last Dose     acetaminophen (TYLENOL) 325 MG tablet Take 2 tablets (650 mg) by mouth every 4 hours as needed for mild pain (to moderate pain)        buPROPion (WELLBUTRIN XL) 300 MG 24 hr tablet Take 1 tablet (300 mg) by mouth daily        clonazePAM (KLONOPIN) 0.5 MG tablet " "Take 0.5 mg by mouth 2 times daily as needed for anxiety        hydrOXYzine (ATARAX) 25 MG tablet Take 1 tablet (25 mg) by mouth every 4 hours as needed for anxiety        [START ON 4/9/2021] lamoTRIgine (LAMICTAL) 100 MG tablet Take 1 tablet (100 mg) by mouth every morning beginning 4/9/2021.        lamoTRIgine (LAMICTAL) 25 MG tablet Take 2 tablets (50 mg) by mouth once for 1 dose on 4/8/2020 at 8 AM.        OLANZapine (ZYPREXA) 10 MG tablet Take 1 tablet (10 mg) by mouth 3 times daily as needed (agitation associated with psychosis)        OLANZapine (ZYPREXA) injection Inject 10 mg into the muscle 3 times daily as needed for agitation (associated with psychosis)        OLANZapine (ZYPREXA) injection Inject 10 mg into the muscle 2 times daily .  Jarvised.  Administer IM Zyprexa if she refuses PO Seroquel XR.        polyethylene glycol (MIRALAX) 17 g packet Take 17 g by mouth daily        protriptyline (VIVACTIL) 10 MG tablet Take 1 tablet (10 mg) by mouth At Bedtime        QUEtiapine (SEROQUEL) 25 MG tablet Take 1 tablet (25 mg) by mouth 3 times daily as needed (psychosis)        QUEtiapine ER (SEROQUEL XR) 300 MG 24 hr tablet Take 1 tablet (300 mg) by mouth 2 times daily .  Jarvised.  Administer IM Zyprexa if she refuses PO Seroquel XR.        ramelteon (ROZEREM) 8 MG tablet Take 1 tablet (8 mg) by mouth At Bedtime 30 tablet 0      rOPINIRole (REQUIP) 1 MG tablet Take 1 tablet (1 mg) by mouth At Bedtime 30 tablet 0      venlafaxine (EFFEXOR-XR) 75 MG 24 hr capsule Take 3 capsules (225 mg) by mouth daily (with breakfast)                 MSE:     Appearance: age-appearing, wearing hospital scrubs, adequate hygiene, right side of face red and swollen, lying in bed comfortably  Behavior: cooperative and calm, demonstrating fair eye contact  Orientation: alert and oriented to time, place and person  Movements: legs appear restless. No tics, tremors, or dystonia observed.   Mood: \"low\"  Affect: restricted, blunted, " "mood-congruent  Speech: low volume, normal rate and tone  Memory: no impairment in immediate, recent, or remote memory  Intellectual capacity: Average for development based on word choice  Concentration: attentive to interview  Thought process and content: linear and coherent; no loosened associations. She endorses auditory hallucinations of jackie's voice commanding her to harm herself. No VH. No delusions or paranoia elicited on exam.  Insight: fair  Judgement: fair  Safety: denies current self-harm or suicidal ideation, although continues to hear voice of jackie commanding her to harm herself. No thoughts to harm others.       Vital signs:  Temp: 95.6  F (35.3  C) Temp src: Oral BP: 99/61 Pulse: 91   Resp: 18 SpO2: 97 % O2 Device: None (Room air)        Estimated body mass index is 42.62 kg/m  as calculated from the following:    Height as of 2/10/21: 1.651 m (5' 5\").    Weight as of 2/25/21: 116.2 kg (256 lb 1.6 oz).            DSM-5 Diagnosis:   Borderline personality disorder  Major depressive disorder, recurrent, severe with psychotic features  Generalized anxiety disorder          Assessment:   Ms. Parham has been transferred from inpatient psychiatry to  for IV antibiotics in the setting of facial cellulitis. She was initially admitted to inpatient psychiatry on 2/10/21. She is currently under a commitment with [facility], which expires in May. A petition for re-commitment was submitted on 4/6/21. She has engaged in self-injurious behavior by scratching/cutting while on inpatient psych. She has been intermittently isolative to her room, refusing groups. She has needed to be locked out of her room in order to encourage group attendance and prevent isolation.     On interview today, she continues to endorse a low mood and anxiety at 8/10. She continues to endorse AH of jackie's voice commanding her to harm herself. She currently denies any thoughts, plans, or intent to engage in self-harm behavior. Endorses some " restless sleep overnight. She was notified that her lamotrigine will be increased to 100 mg tomorrow. No additional medication changes at this time. She have multiple PRN medications available, including quetiapine, clonazepam, hydroxyzine, olanzapine, and trazodone. She is currently compliant with medical care. Will remain on a 1:1 at this time to due self-harm risk.           Summary of Recommendations:   1) Plan to increase lamotrigine to 100 mg daily beginning tomorrow. No additional medication changes planned at this time.     2) Continue 1:1 bedside attendant at this time for self-harm risk. Patient is known to be sneaky in regard to her self-injurious behaviors - keep sharp objects out of her room.     3) Will continue to follow while she is admitted to medicine    4) Page me with additional questions or concerns. Thank you for this consult.      WILLIAN Potter, [facility]   Contact information available via [facility] Paging/Directory

## 2021-04-08 NOTE — PLAN OF CARE
VS:   BP 99/61 (BP Location: Left arm)   Pulse 91   Temp 95.6  F (35.3  C) (Oral)   Resp 18   SpO2 97%   RA. Sitter for safety, SI   Output:   Voiding without difficulty. LBM 4/7   Activity:   Independent in room   Skin: Ex redness blanchable right face, bilaterall forearms- scratches/cuts healing.   Pain:   Managed with Ibuprofen and tylenol   Neuro/CMS:   Ex hallucinations and thoughts of suicide, no plan.   Dressing(s):   None   Diet:   Regular, tolerating.   LDA:   PIV infusing 75ml/hr   Equipment:   IV pole, call light   Plan:   Continue to monitor and follow plan of care.   Additional Info:

## 2021-04-08 NOTE — PLAN OF CARE
"   VS: BP 94/48 (BP Location: Left arm)   Pulse 86   Temp 97.3  F (36.3  C) (Oral)   Resp 16   SpO2 98%    Output: Voiding spontaneously without difficulty. LBM 4/6. BS active x4.   Lungs: CTA. Denies SOB, chest pain, cough.   Activity: Up independently.   Skin: WDL ex scratches on right and left forearms (Hx of scratching since 8th grade), and redness to R side of face.   Pain:    Denies. No prn Tylenol or ibuprofen given.   Neuro/CMS:    A&Ox4, CMS intact, denies N/T   Dressing(s):    None   Diet:    Regular, tolerating small amounts of food.    LDA:    L PIV infusing   Equipment:    IV pole   Plan:    Monitor for S/S of SI and follow POC. Continue to administer abx for facial cellulitis. 1:1 sitter for SI.   Additional Info:    Nurse passed along in report that patient will try to be \"sneaky\" about suicide attempts so be aware of what is in patients room.      "

## 2021-04-08 NOTE — PROGRESS NOTES
"Pt was seen, course reviewed with team    Pt notes some improvement in R facial pain and swelling  Able to eat and drink  No HA or vision changes  Spirits are \"OK\"    Afebrile  VSS  02 sats upper 90s RA    Sleepy, easily alerted, fully oriented  Affect blunted  HEENT  Decreased erythema R cheek and forehead area.  + edema R lateral orbital area and upper lid  + mildly tender, firm R parotid  Lungs clear  CV rrr  No M  Abd soft  No LE edema      CT face  Impression:   1.  Mild soft tissue thickening along the right lateral forehead  extending inferiorly towards the asymmetrically enlarged right parotid  gland with adjacent fat stranding, concerning for cellulitis and  parotiditis. No drainable fluid collection on either side.  2.  Likely reactive bilateral cervical lymphadenopathy.  3.  Poor dentition as detailed above, but this appears unrelated to  the cellulitis.       Results for CHASITY PERKINS BERTRAND (MRN [id number]) as of 4/8/2021 12:17   Ref. Range 4/8/2021 07:27   Sodium Latest Ref Range: 133 - 144 mmol/L 138   Potassium Latest Ref Range: 3.4 - 5.3 mmol/L 3.6   Chloride Latest Ref Range: 94 - 109 mmol/L 109   Carbon Dioxide Latest Ref Range: 20 - 32 mmol/L 21   Urea Nitrogen Latest Ref Range: 7 - 30 mg/dL 12   Creatinine Latest Ref Range: 0.52 - 1.04 mg/dL 0.75   GFR Estimate Latest Ref Range: >60 mL/min/1.73_m2 >90   GFR Estimate If Black Latest Ref Range: >60 mL/min/1.73_m2 >90   Calcium Latest Ref Range: 8.5 - 10.1 mg/dL 7.9 (L)   Anion Gap Latest Ref Range: 3 - 14 mmol/L 8   CRP Inflammation Latest Ref Range: 0.0 - 8.0 mg/L 220.0 (H)   Glucose Latest Ref Range: 70 - 99 mg/dL 104 (H)   WBC Latest Ref Range: 4.0 - 11.0 10e9/L 13.5 (H)   Hemoglobin Latest Ref Range: 11.7 - 15.7 g/dL 10.3 (L)   Hematocrit Latest Ref Range: 35.0 - 47.0 % 31.3 (L)   Platelet Count Latest Ref Range: 150 - 450 10e9/L 206   RBC Count Latest Ref Range: 3.8 - 5.2 10e12/L 3.57 (L)   MCV Latest Ref Range: 78 - 100 fl 88   MCH Latest Ref " Range: 26.5 - 33.0 pg 28.9   MCHC Latest Ref Range: 31.5 - 36.5 g/dL 32.9   RDW Latest Ref Range: 10.0 - 15.0 % 14.1   Diff Method Unknown Automated Method   % Neutrophils Latest Units: % 87.1   % Lymphocytes Latest Units: % 6.8   % Monocytes Latest Units: % 4.8   % Eosinophils Latest Units: % 0.3   % Basophils Latest Units: % 0.3   % Immature Granulocytes Latest Units: % 0.7     BC neg  MRSA screen pending      Assessment    Sepsis on admission, due to R parotitis, with secondary facial cellulitis, likely not related to dental process.  Pain and erythema improved, fever resolved, new edema eyelid.  Orbits unremarkable per CT.  Suspect parotitis related to psychiatric medications in part  Plan continue empiric Vancomycin and Unasyn, attempt to obtain sour candy to stimulate saliva production .  ENT consult if not improving.  Monitor exam, CRP. Obtain screen for MRSA         Suicidal ideation  Borderline personality disorder  MDD, recurrent, severe with psychotic features  Generalized anxiety disorder  She was admitted to inpatient behavioral health under commitment MI with [facility] after provisional discharge was revoked.   Plan continue PTA psychiatric medications  Attendant.  Transfer back to mental health when medically stable    Diet:   Regular  DVT Prophylaxis: Pneumatic Compression Devices  Allen Catheter: not present  Code Status:   FULL

## 2021-04-08 NOTE — CONSULTS
Social Work Progress Note    Pt is under a full MI civil commitment with [facility] and transferred from [facility] for cellulitis and IV antibiotics.  The pt will transfer back to inpatient psych after her medical issues have resolved.     SW met briefly with pt at bedside to introduce herself.  Pt denied completing case management assessment and was somnolent during meeting.  SW left her contact information at the pt's bedside.     The pt is up for recommitment and a hearing will be scheduled soon.     Pt's  for her civil commitment is Samreen Machuca, contact below.     SW rec'd email from Samreen, wanting an update.  SW sent her updated notes via secure email.       Samreen Machuca  [facility]  [phone] [place]  [phone] [employer]  [email or web address].

## 2021-04-08 NOTE — UTILIZATION REVIEW
Admission Status; Secondary Review Determination       Under the authority of the Utilization Management Committee, the utilization review process indicated a secondary review on the above patient. The review outcome is based on review of the medical records, discussions with staff, and applying clinical experience noted on the date of the review.     (x) Inpatient Status Appropriate - This patient's medical care is consistent with medical management for inpatient care and reasonable inpatient medical practice.     RATIONALE FOR DETERMINATION     35-year-old woman admitted to inpatient behavioral health under commitment MI with [facility] after provisional discharge was revoked.   Now she has facial cellulitis with parotitis on broad-spectrum antibiotic coverage with expected ongoing hospital care in a patient who has suicidal ideation, borderline personality, severe psychotic features in the setting of major depression.  The expected length of stay at the time of admission was more than 2 nights because of the severity of illness, intensity of service provided, and risk for adverse outcome. Inpatient admission is appropriate.     This document was produced using voice recognition software       The information on this document is developed by the utilization review team in order for the business office to ensure compliance. This only denotes the appropriateness of proper admission status and does not reflect the quality of care rendered.   The definitions of Inpatient Status and Observation Status used in making the determination above are those provided in the CMS Coverage Manual, Chapter 1 and Chapter 6, section 70.4.   Sincerely,   SHEA KAHN MD   System Medical Director   Utilization Management   [facility].      All overnight with nursing deformity is

## 2021-04-09 LAB
ANION GAP SERPL CALCULATED.3IONS-SCNC: 7 MMOL/L (ref 3–14)
BASOPHILS # BLD AUTO: 0.1 10E9/L (ref 0–0.2)
BASOPHILS NFR BLD AUTO: 0.5 %
BUN SERPL-MCNC: 10 MG/DL (ref 7–30)
CALCIUM SERPL-MCNC: 7.9 MG/DL (ref 8.5–10.1)
CHLORIDE SERPL-SCNC: 111 MMOL/L (ref 94–109)
CO2 SERPL-SCNC: 23 MMOL/L (ref 20–32)
CREAT SERPL-MCNC: 0.64 MG/DL (ref 0.52–1.04)
CRP SERPL-MCNC: 200 MG/L (ref 0–8)
DIFFERENTIAL METHOD BLD: ABNORMAL
EOSINOPHIL # BLD AUTO: 0.2 10E9/L (ref 0–0.7)
EOSINOPHIL NFR BLD AUTO: 1.9 %
ERYTHROCYTE [DISTWIDTH] IN BLOOD BY AUTOMATED COUNT: 14.4 % (ref 10–15)
GFR SERPL CREATININE-BSD FRML MDRD: >90 ML/MIN/{1.73_M2}
GLUCOSE SERPL-MCNC: 95 MG/DL (ref 70–99)
HCT VFR BLD AUTO: 30.4 % (ref 35–47)
HGB BLD-MCNC: 9.9 G/DL (ref 11.7–15.7)
IMM GRANULOCYTES # BLD: 0.1 10E9/L (ref 0–0.4)
IMM GRANULOCYTES NFR BLD: 1 %
LYMPHOCYTES # BLD AUTO: 1.2 10E9/L (ref 0.8–5.3)
LYMPHOCYTES NFR BLD AUTO: 11.5 %
MCH RBC QN AUTO: 28.8 PG (ref 26.5–33)
MCHC RBC AUTO-ENTMCNC: 32.6 G/DL (ref 31.5–36.5)
MCV RBC AUTO: 88 FL (ref 78–100)
MONOCYTES # BLD AUTO: 0.8 10E9/L (ref 0–1.3)
MONOCYTES NFR BLD AUTO: 7.4 %
MRSA DNA SPEC QL NAA+PROBE: NEGATIVE
NEUTROPHILS # BLD AUTO: 8.1 10E9/L (ref 1.6–8.3)
NEUTROPHILS NFR BLD AUTO: 77.7 %
NRBC # BLD AUTO: 0 10*3/UL
NRBC BLD AUTO-RTO: 0 /100
PLATELET # BLD AUTO: 241 10E9/L (ref 150–450)
POTASSIUM SERPL-SCNC: 4 MMOL/L (ref 3.4–5.3)
RBC # BLD AUTO: 3.44 10E12/L (ref 3.8–5.2)
SODIUM SERPL-SCNC: 141 MMOL/L (ref 133–144)
SPECIMEN SOURCE: NORMAL
WBC # BLD AUTO: 10.5 10E9/L (ref 4–11)

## 2021-04-09 PROCEDURE — 80048 BASIC METABOLIC PNL TOTAL CA: CPT | Performed by: INTERNAL MEDICINE

## 2021-04-09 PROCEDURE — 250N000013 HC RX MED GY IP 250 OP 250 PS 637: Performed by: PHYSICIAN ASSISTANT

## 2021-04-09 PROCEDURE — 250N000011 HC RX IP 250 OP 636: Performed by: PHYSICIAN ASSISTANT

## 2021-04-09 PROCEDURE — 86140 C-REACTIVE PROTEIN: CPT | Performed by: INTERNAL MEDICINE

## 2021-04-09 PROCEDURE — 258N000003 HC RX IP 258 OP 636: Performed by: INTERNAL MEDICINE

## 2021-04-09 PROCEDURE — 250N000011 HC RX IP 250 OP 636: Performed by: INTERNAL MEDICINE

## 2021-04-09 PROCEDURE — 99207 PR CDG-MDM COMPONENT: MEETS MODERATE - UP CODED: CPT | Performed by: NURSE PRACTITIONER

## 2021-04-09 PROCEDURE — 85025 COMPLETE CBC W/AUTO DIFF WBC: CPT | Performed by: INTERNAL MEDICINE

## 2021-04-09 PROCEDURE — 99232 SBSQ HOSP IP/OBS MODERATE 35: CPT | Mod: 25 | Performed by: NURSE PRACTITIONER

## 2021-04-09 PROCEDURE — 36415 COLL VENOUS BLD VENIPUNCTURE: CPT | Performed by: INTERNAL MEDICINE

## 2021-04-09 PROCEDURE — 99232 SBSQ HOSP IP/OBS MODERATE 35: CPT | Performed by: INTERNAL MEDICINE

## 2021-04-09 PROCEDURE — 87641 MR-STAPH DNA AMP PROBE: CPT | Performed by: INTERNAL MEDICINE

## 2021-04-09 PROCEDURE — 120N000002 HC R&B MED SURG/OB UMMC

## 2021-04-09 PROCEDURE — 258N000003 HC RX IP 258 OP 636

## 2021-04-09 PROCEDURE — 87640 STAPH A DNA AMP PROBE: CPT | Performed by: INTERNAL MEDICINE

## 2021-04-09 PROCEDURE — 250N000013 HC RX MED GY IP 250 OP 250 PS 637: Performed by: INTERNAL MEDICINE

## 2021-04-09 RX ORDER — SODIUM CHLORIDE 9 MG/ML
INJECTION, SOLUTION INTRAVENOUS
Status: COMPLETED
Start: 2021-04-09 | End: 2021-04-09

## 2021-04-09 RX ADMIN — CLONAZEPAM 0.5 MG: 0.5 TABLET ORAL at 09:48

## 2021-04-09 RX ADMIN — LAMOTRIGINE 100 MG: 100 TABLET ORAL at 09:23

## 2021-04-09 RX ADMIN — QUETIAPINE FUMARATE 300 MG: 300 TABLET, EXTENDED RELEASE ORAL at 21:08

## 2021-04-09 RX ADMIN — AMPICILLIN SODIUM AND SULBACTAM SODIUM 3 G: 2; 1 INJECTION, POWDER, FOR SOLUTION INTRAMUSCULAR; INTRAVENOUS at 04:53

## 2021-04-09 RX ADMIN — SODIUM CHLORIDE 500 ML: 9 INJECTION, SOLUTION INTRAVENOUS at 18:32

## 2021-04-09 RX ADMIN — ONDANSETRON 4 MG: 4 TABLET, ORALLY DISINTEGRATING ORAL at 03:11

## 2021-04-09 RX ADMIN — RAMELTEON 8 MG: 8 TABLET ORAL at 21:08

## 2021-04-09 RX ADMIN — PROTRIPTYLINE HYDROCHLORIDE 10 MG: 10 TABLET ORAL at 21:08

## 2021-04-09 RX ADMIN — QUETIAPINE FUMARATE 300 MG: 300 TABLET, EXTENDED RELEASE ORAL at 09:23

## 2021-04-09 RX ADMIN — SODIUM CHLORIDE: 9 INJECTION, SOLUTION INTRAVENOUS at 19:28

## 2021-04-09 RX ADMIN — AMPICILLIN SODIUM AND SULBACTAM SODIUM 3 G: 2; 1 INJECTION, POWDER, FOR SOLUTION INTRAMUSCULAR; INTRAVENOUS at 18:21

## 2021-04-09 RX ADMIN — SODIUM CHLORIDE 1000 ML: 9 INJECTION, SOLUTION INTRAVENOUS at 04:21

## 2021-04-09 RX ADMIN — POLYETHYLENE GLYCOL 3350 17 G: 17 POWDER, FOR SOLUTION ORAL at 09:23

## 2021-04-09 RX ADMIN — IBUPROFEN 600 MG: 600 TABLET, FILM COATED ORAL at 03:02

## 2021-04-09 RX ADMIN — VANCOMYCIN HYDROCHLORIDE 1750 MG: 10 INJECTION, POWDER, LYOPHILIZED, FOR SOLUTION INTRAVENOUS at 06:45

## 2021-04-09 RX ADMIN — BUPROPION HYDROCHLORIDE 300 MG: 300 TABLET, FILM COATED, EXTENDED RELEASE ORAL at 09:22

## 2021-04-09 RX ADMIN — AMPICILLIN SODIUM AND SULBACTAM SODIUM 3 G: 2; 1 INJECTION, POWDER, FOR SOLUTION INTRAMUSCULAR; INTRAVENOUS at 10:43

## 2021-04-09 RX ADMIN — VENLAFAXINE HYDROCHLORIDE 225 MG: 75 CAPSULE, EXTENDED RELEASE ORAL at 09:23

## 2021-04-09 RX ADMIN — ROPINIROLE HYDROCHLORIDE 1 MG: 1 TABLET, FILM COATED ORAL at 21:08

## 2021-04-09 RX ADMIN — HYDROXYZINE HYDROCHLORIDE 25 MG: 25 TABLET, FILM COATED ORAL at 03:11

## 2021-04-09 NOTE — PLAN OF CARE
"  Patient denies SI as of this morning. Patient says she is having urges to self harm. \"[facility] is talking to me, telling me I should have  the first time. He tells me to rip out my IV and cut myself.\" Patient says that klonopin helps quiet the voices and ease anxiety. Flat affect throughout the shift. Patient is cooperative & A/O x4. Encouraging activity outside of the room; SBA. Pt showered and agrees to go for a walk this evening. VSS. Sitter continued.   Skin WDL ex scratches/scarring to both arms from self manipulation & erythema to R side of face due to facial cellulitis; continue with IV antibiotics. Nasal swab negative for MRSA and Staphylococcus aureus.  ---[facility] order for seroquel  "

## 2021-04-09 NOTE — PLAN OF CARE
VS: BP 99/52 (BP Location: Right arm)   Pulse 89   Temp 95.3  F (35.2  C) (Oral)   Resp 16   SpO2 98%    Denies chest pain and SOB  Alert and oriented  1:1 - sitter at bedside. SI. Pt stated that has suicidal thoughts about scratching. Pt stated that jacike is talking to her and is telling her to stop antibiotics and to not get help.    O2: >90% on RA   Output: Voids via toilet   Last BM: 4/7/21  Bowel sounds active   Activity: Independent in room   Skin: Edema to R eye, scratches to arms from self scratching. Pt did not want full skin assessment. Other than scratches on arms and R eye - visible skin intact   Pain: Denies   CMS: Intact. Denies numbness and tingling   Dressing: PIV R hand dressing - CDI   Diet: Regular   LDA: PIV R hand - infusing 45mL/hr   Equipment: IV pole   Plan: Continue to monitor   Additional Info: Per sitter - explained that pt stated that she wants to cut herself with the plastic juice cups

## 2021-04-09 NOTE — PROGRESS NOTES
Care Management Follow Up    Length of Stay (days): 2    Expected Discharge Date: 04/12/21     Concerns to be Addressed:   Discharge planning, civil commitment    Patient plan of care discussed at interdisciplinary rounds: Yes    Anticipated Discharge Disposition:  Inpatient psych     Anticipated Discharge Services:  N/A  Anticipated Discharge DME:  N/A    Patient/family educated on Medicare website which has current facility and service quality ratings:  No - [facility] only  Education Provided on the Discharge Plan:  Yes  Patient/Family in Agreement with the Plan:  Unable to assess    Referrals Placed by CM/SW:  N/A  Private pay costs discussed: Not applicable    Additional Information:  FAITH emailed pt's  Samreen Machuca (contact below) to provide requested update. Pt will likely be hospitalized on 8A over the weekend while she completes IV antibiotics before being able to transfer back to inpatient psych.  FAITH sent updated notes via secure email.  FAITH asked if there is an update on when the recommitment hearing will occur.       Samreen Machuca  [facility]  [phone] [place]  [phone] [email or web address]    ANURAG Varela  [facility]  [address] Ortho [address] 8[address]   Ph: [phone]  Pager: [phone]

## 2021-04-09 NOTE — PLAN OF CARE
BP 92/44 (BP Location: Right arm)   Pulse 78   Temp 95.9  F (35.5  C) (Oral)   Resp 16   SpO2 97%      Pt is A&Ox4, has suicidal thoughts, but states she does not have a plan or means to do so. BPs have been soft this shift and urine output low over the course of the day. Bladder scan 270, voiding small amounts periodically.  Pt triggered sepsis.  500 ml bolus infused and fluid rate increased to 100ml/hr. Lactic acid 1.0, denies dizziness, CP, or nausea.  Pulse rate wnl, and is otherwise asymptomatic. Pt has redness/swelling on R side of face, but unchanged. Pt also has some scratches on forearms from previous self harm attempt, but is healing well and HANNAH.  IV abx infused per orders. Activity encouraged, pt walked up and down the [address] after eating dinner. Ate 100% of meals. Independent in room but sitter at bedside for SI.   Continue to infuse scheduled abx, monitor urine output and encourage activity. Pt is able to make needs known and call light is in reach.

## 2021-04-09 NOTE — PLAN OF CARE
VS: BP 93/55 (BP Location: Left arm)   Pulse 87   Temp 96.1  F (35.6  C) (Oral)   Resp 16   SpO2 95%      Output: Voiding spontaneously without difficulty. LBM 4/6. BS active x4.     Lungs: CTA. O2: RA. Denies SOB, chest pain, cough.     Activity: Up independently in room.     Skin: WDL ex scratches on right and left forearms (Hx of scratching since 8th grade), and blanchable redness/swelling to R side of face.     Pain:    Managed with prn Tylenol Q4HR and ibuprofen Q6HR. Ibuprofen given x1 this shift.      Neuro/CMS:    A&Ox4, CMS intact, denies N/T   Dressing(s):    None   Diet:    Regular, tolerating sips of water throughout night.    LDA:    L PIV infusing 100 ml/hr NS   Equipment:    IV pole   Plan:    Monitor for S/S of SI and follow POC. Continue to administer abx for facial cellulitis. 1:1 sitter for SI.     Additional Info:    Zofran administered x1 for intermittent nausea  Atarax administered x1 for anxiety/itching

## 2021-04-09 NOTE — PROGRESS NOTES
"Pt was offered individual psychotherapy after a transfer from inpatient behavioral health to a medical unit for the weekend.  She welcomed the therapy that implemented both verbal and nonverbal interventions.      The nonverbal interventions resulted in notable increased depth of breathing as well an increase in range of affect (smiling.)  Verbally, pt expressed some fear of \"the devil\" encouraging her to resist the medical treatment she is receiving so she can die.  Pt expressed passive and active SI throughout the session, therefore ongoing SIO is recommended.  Pt also appeared to be reassured that she is not battling her AH \"alone\" but has a treatment team to support her.  Pt identified her adult and adolescent children as another resource for her.     Pt expressed extreme emotional fatigue fighting off psychotic symptoms she identifies as the devil. She stated she is \"giving up and ready to die.\" She did not identify her [employer] as a resource in this mascorro.  Pt accepted physical movements of release and yielding with coaching and support to ease some of her cognitive struggle.  She is prepared to accept rest, treatment and rejuvenation over the weekend, then return to inpatient mental health support programming next week.         04/09/21 1200   Dance Movement Therapy   Type of Intervention 1:1 intervention   Response participates with encouragement   Hours 1     "

## 2021-04-09 NOTE — PHARMACY-VANCOMYCIN DOSING SERVICE
Pharmacy Vancomycin Note  Date of Service 2021  Patient's  1985   35 year old, female    Indication: Skin and Soft Tissue Infection with facial cellulitis  Goal Trough Level: 15-20 mg/L, may be able to lower goal as cultures result  Day of Therapy: Started   Current Vancomycin regimen:  1500 mg IV q8h    Current estimated CrCl = Estimated Creatinine Clearance: 130.8 mL/min (based on SCr of 0.75 mg/dL).    Creatinine for last 3 days  2021:  8:28 AM Creatinine 0.75 mg/dL  2021:  7:27 AM Creatinine 0.75 mg/dL    Recent Vancomycin Levels (past 3 days)  2021:  4:28 PM Vancomycin Level 19.7 mg/L    Vancomycin IV Administrations (past 72 hours)                   vancomycin (VANCOCIN) 1,500 mg in sodium chloride 0.9 % 250 mL intermittent infusion (mg) 1,500 mg New Bag 21 1828     1,500 mg New Bag  0840     1,500 mg New Bag  0005     1,500 mg New Bag 21 1603                Nephrotoxins and other renal medications (From now, onward)    Start     Dose/Rate Route Frequency Ordered Stop    21 1500  vancomycin (VANCOCIN) 1,500 mg in sodium chloride 0.9 % 250 mL intermittent infusion      1,500 mg  over 90 Minutes Intravenous EVERY 8 HOURS 21 1319      21 1400  ampicillin-sulbactam (UNASYN) 3 g vial to attach to  mL bag      3 g  over 1 Hours Intravenous EVERY 6 HOURS 21 1303      21 1303  ibuprofen (ADVIL/MOTRIN) tablet 600 mg      600 mg Oral EVERY 6 HOURS PRN 21 1303               Contrast Orders - past 72 hours (72h ago, onward)    Start     Dose/Rate Route Frequency Ordered Stop    21 1400  iopamidol (ISOVUE-370) solution 100 mL      100 mL Intravenous ONCE 21 1357 21 1418          Interpretation of levels and current regimen:  Trough level is  Therapeutic. However, this trough was drawn before steady state.    Has serum creatinine changed > 50% in last 72 hours: No    Urine output:  unable to determine    Renal Function:  Stable    Plan:  1.  Decrease Dose to 1750 mg IV q 12 hours. Pt likely will accumulate at q 8 hour dosing.   2.  Pharmacy will check trough levels as appropriate in 1-3 Days.    3. Serum creatinine levels will be ordered daily for the first week of therapy and at least twice weekly for subsequent weeks.      Sunny Ryan RPH        .

## 2021-04-09 NOTE — CONSULTS
"    Psychiatry Consultation; Follow up          Reason for Consult, requesting source:    F/u visit  Requesting source: Shiv Rivero            Interim history:    Ms. Hansa Parham is a 35-year-old female who was admitted to  as a transfer from [facility] inpatient psychiatry for IV antibiotics in the setting of facial cellulitis. She was initially admitted to psychiatry on 2/10/21. She is on a full commitment with [facility] which is set to  in May. A  petition for recommitment was submitted on . Psychiatric history significant for borderline personality disorder, major depressive disorder, and generalized anxiety disorder. Psychiatry is consulted to follow patient while on medicine.    Patient is seen in her hospital room today. She reports ongoing feelings of depression. She reports ongoing AH - hearing the voice of jackie. Reports the voice is a bit more intense today. She states the voice is telling her to pull out her IV, telling her that she shouldn't have gotten treatment for her infection, that she should have . Reports that she wants to give up, stating \"I'm tired.\" She was encouraged to utilize distraction techniques, walking in the halls, listening to music.          Medications:     Current Facility-Administered Medications   Medication     acetaminophen (TYLENOL) tablet 650 mg     ampicillin-sulbactam (UNASYN) 3 g vial to attach to  mL bag     buPROPion (WELLBUTRIN XL) 24 hr tablet 300 mg     clonazePAM (klonoPIN) tablet 0.5 mg     hydrOXYzine (ATARAX) tablet 25 mg     ibuprofen (ADVIL/MOTRIN) tablet 600 mg     lactated ringers BOLUS 1,000 mL     lamoTRIgine (LaMICtal) tablet 100 mg     lidocaine (LMX4) cream     lidocaine 1 % 0.1-1 mL     melatonin tablet 1 mg     QUEtiapine ER (SEROquel XR) 24 hr tablet 300 mg    Or     OLANZapine (zyPREXA) injection 10 mg     QUEtiapine ER (SEROquel XR) 24 hr tablet 300 mg    Or     OLANZapine (zyPREXA) injection 10 mg     OLANZapine (zyPREXA) tablet " "10 mg    Or     OLANZapine (zyPREXA) injection 10 mg     ondansetron (ZOFRAN-ODT) ODT tab 4 mg     polyethylene glycol (MIRALAX) Packet 17 g     protriptyline (VIVACTIL) tablet 10 mg     QUEtiapine (SEROquel) half-tab 25 mg     ramelteon (ROZEREM) tablet 8 mg     rOPINIRole (REQUIP) tablet 1 mg     sodium chloride (PF) 0.9% PF flush 3 mL     sodium chloride (PF) 0.9% PF flush 3 mL     sodium chloride (PF) 0.9% PF flush 3 mL     sodium chloride 0.9% infusion     vancomycin (VANCOCIN) 1,750 mg in sodium chloride 0.9 % 500 mL intermittent infusion     venlafaxine (EFFEXOR-XR) 24 hr capsule 225 mg              MSE:     Appearance: age-appearing, wearing hospital scrubs, adequate hygiene, right side of face swollen, lying in bed comfortably  Behavior: cooperative, demonstrating fair eye contact  Orientation: alert and oriented to time, place and person  Movements: No tics, tremors, or dystonia observed.   Mood: \"tired\"  Affect: restricted, blunted, mood-congruent  Speech: low volume, normal rate and tone  Memory: no impairment in immediate, recent, or remote memory  Intellectual capacity: Average for development based on word choice  Concentration: attentive to interview  Thought process and content: linear and coherent; no loosened associations. She endorses auditory hallucinations of jackie's voice commanding her to harm herself. No VH. No delusions or paranoia elicited on exam.  Insight: fair  Judgement: fair  Safety: denies current self-harm or suicidal ideation, although continues to hear voice of jackie commanding her to harm herself. No thoughts to harm others.     Vital signs:  Temp: 96.8  F (36  C) Temp src: Oral BP: 109/64 Pulse: 88   Resp: 16 SpO2: 94 % O2 Device: None (Room air)        Estimated body mass index is 42.62 kg/m  as calculated from the following:    Height as of 2/10/21: 1.651 m (5' 5\").    Weight as of 2/25/21: 116.2 kg (256 lb 1.6 oz).              DSM-5 Diagnosis:   Borderline personality " disorder  Major depressive disorder, recurrent, severe with psychotic features  Generalized anxiety disorder          Assessment:   Patient seen for psychiatric follow-up today. She reports that she is still feeling down, somewhat hopeless. Continues to hear the voice of jackie telling her to pull out her IV, telling her she should not have been treated for her infection. She was encouraged to focus on what she can do to help distract herself from these voices - encouraged walking, music, art. I reached out to the inpatient therapists to see if they could meet with her. For now, will continue her current medication regimen. Her lamotrigine is being uptitrated. She remains on a commitment and [facility]. Petition for recommitment has been submitted, awaiting date of court hearing.           Summary of Recommendations:   1) Continue current medication regimen. Her lamotrigine has been increased to 100 mg daily.     2) Continue 1:1 bedside attendant for risk of self-injurious behavior.    3) It sounds as though she will be on medicine through the weekend. Plan to discharge back to inpatient psychiatry when medically cleared. Will need to call intake to present the case at that time.     4) Page me with additional questions or concerns. Thank you.         WILLIAN Potter, [facility]   Contact information available via [facility] Paging/Directory

## 2021-04-09 NOTE — PROGRESS NOTES
Pt was seen, course reviewed with team.      Pt states R side of face is slightly less swollen and uncomfortable.  She is eating and drinking without difficulties.  She denies nausea, vomiting, cough, shortness of breath, dizziness    She received a saline bolus last evening for blood pressure systolic 80s.  She was asymptomatic    Afebrile  Blood pressures 90s to low 100s/  Sleepy, is alert, appears comfortable.  Affect blunted  Erythema right cheek area is diminished.  There continues to be edema right side of face involving her right periorbital area and right upper eyelid  No eye redness.  Extraocular muscles intact.  Neck supple  Right parotid gland is definitely less prominent and nontender today  Lungs clear  CV regular rhythm  Abdomen soft  No lower extremity edema    Results for CHASITY PERKINS (MRN [id number]) as of 4/9/2021 14:16   Ref. Range 4/9/2021 07:24   Sodium Latest Ref Range: 133 - 144 mmol/L 141   Potassium Latest Ref Range: 3.4 - 5.3 mmol/L 4.0   Chloride Latest Ref Range: 94 - 109 mmol/L 111 (H)   Carbon Dioxide Latest Ref Range: 20 - 32 mmol/L 23   Urea Nitrogen Latest Ref Range: 7 - 30 mg/dL 10   Creatinine Latest Ref Range: 0.52 - 1.04 mg/dL 0.64   GFR Estimate Latest Ref Range: >60 mL/min/1.73_m2 >90   GFR Estimate If Black Latest Ref Range: >60 mL/min/1.73_m2 >90   Calcium Latest Ref Range: 8.5 - 10.1 mg/dL 7.9 (L)   Anion Gap Latest Ref Range: 3 - 14 mmol/L 7   CRP Inflammation Latest Ref Range: 0.0 - 8.0 mg/L 200.0 (H)   Glucose Latest Ref Range: 70 - 99 mg/dL 95   WBC Latest Ref Range: 4.0 - 11.0 10e9/L 10.5   Hemoglobin Latest Ref Range: 11.7 - 15.7 g/dL 9.9 (L)   Hematocrit Latest Ref Range: 35.0 - 47.0 % 30.4 (L)   Platelet Count Latest Ref Range: 150 - 450 10e9/L 241   RBC Count Latest Ref Range: 3.8 - 5.2 10e12/L 3.44 (L)   MCV Latest Ref Range: 78 - 100 fl 88   MCH Latest Ref Range: 26.5 - 33.0 pg 28.8   MCHC Latest Ref Range: 31.5 - 36.5 g/dL 32.6   RDW Latest Ref Range: 10.0 -  15.0 % 14.4   Diff Method Unknown Automated Method   % Neutrophils Latest Units: % 77.7   % Lymphocytes Latest Units: % 11.5   % Monocytes Latest Units: % 7.4   % Eosinophils Latest Units: % 1.9   % Basophils Latest Units: % 0.5   % Immature Granulocytes Latest Units: % 1.0   Nucleated RBCs Latest Ref Range: 0 /100 0   Absolute Neutrophil Latest Ref Range: 1.6 - 8.3 10e9/L 8.1   Absolute Lymphocytes Latest Ref Range: 0.8 - 5.3 10e9/L 1.2   Absolute Monocytes Latest Ref Range: 0.0 - 1.3 10e9/L 0.8       MRSA nasal screen negative      Assessment/plan    Sepsis on admission, secondary to acute R parotitis, with secondary facial cellulitis, likely not related to dental process.  Pain and erythema improved, fever resolved, new edema eyelid.  Orbits unremarkable per CT.  Suspect parotitis related to psychiatric medications in part  Pain, redness of face and prominence of right parotid gland improved.  She continues to have significant right facial edema.  Plan Will discontinue vancomycin in view of negative MRSA screen, continue Unasyn with anticipated transition to Augmentin in a few days.  Monitor exam, repeat CRP in a couple days        Suicidal ideation  Borderline personality disorder  MDD, recurrent, severe with psychotic features  Generalized anxiety disorder  She was admitted to inpatient behavioral health under commitment MI with [facility] after provisional discharge was revoked.   She was seen by psychiatry yesterday with plan to increase Lamictal to 100 mg daily today  Plan Attendant.  Transfer back to mental health when medically stable    Diet:   Regular  DVT Prophylaxis: Pneumatic Compression Devices  Allen Catheter: not present  Code Status:   FULL

## 2021-04-10 PROCEDURE — 120N000002 HC R&B MED SURG/OB UMMC

## 2021-04-10 PROCEDURE — 250N000011 HC RX IP 250 OP 636: Performed by: PHYSICIAN ASSISTANT

## 2021-04-10 PROCEDURE — 250N000013 HC RX MED GY IP 250 OP 250 PS 637: Performed by: INTERNAL MEDICINE

## 2021-04-10 PROCEDURE — 258N000003 HC RX IP 258 OP 636: Performed by: INTERNAL MEDICINE

## 2021-04-10 PROCEDURE — 250N000013 HC RX MED GY IP 250 OP 250 PS 637: Performed by: PHYSICIAN ASSISTANT

## 2021-04-10 PROCEDURE — 99231 SBSQ HOSP IP/OBS SF/LOW 25: CPT | Performed by: INTERNAL MEDICINE

## 2021-04-10 RX ORDER — POLYETHYLENE GLYCOL 3350 17 G/17G
17 POWDER, FOR SOLUTION ORAL 2 TIMES DAILY
Status: DISCONTINUED | OUTPATIENT
Start: 2021-04-10 | End: 2021-04-12 | Stop reason: HOSPADM

## 2021-04-10 RX ORDER — BISACODYL 10 MG
10 SUPPOSITORY, RECTAL RECTAL DAILY PRN
Status: DISCONTINUED | OUTPATIENT
Start: 2021-04-10 | End: 2021-04-12 | Stop reason: HOSPADM

## 2021-04-10 RX ORDER — SENNOSIDES 8.6 MG
8.6 TABLET ORAL 2 TIMES DAILY PRN
Status: DISCONTINUED | OUTPATIENT
Start: 2021-04-10 | End: 2021-04-12 | Stop reason: HOSPADM

## 2021-04-10 RX ORDER — SODIUM CHLORIDE 9 MG/ML
INJECTION, SOLUTION INTRAVENOUS
Status: DISPENSED
Start: 2021-04-10 | End: 2021-04-10

## 2021-04-10 RX ADMIN — AMPICILLIN SODIUM AND SULBACTAM SODIUM 3 G: 2; 1 INJECTION, POWDER, FOR SOLUTION INTRAMUSCULAR; INTRAVENOUS at 18:21

## 2021-04-10 RX ADMIN — QUETIAPINE FUMARATE 300 MG: 300 TABLET, EXTENDED RELEASE ORAL at 21:28

## 2021-04-10 RX ADMIN — PROTRIPTYLINE HYDROCHLORIDE 10 MG: 10 TABLET ORAL at 21:28

## 2021-04-10 RX ADMIN — AMPICILLIN SODIUM AND SULBACTAM SODIUM 3 G: 2; 1 INJECTION, POWDER, FOR SOLUTION INTRAMUSCULAR; INTRAVENOUS at 05:38

## 2021-04-10 RX ADMIN — POLYETHYLENE GLYCOL 3350 17 G: 17 POWDER, FOR SOLUTION ORAL at 08:41

## 2021-04-10 RX ADMIN — LAMOTRIGINE 100 MG: 100 TABLET ORAL at 08:40

## 2021-04-10 RX ADMIN — BUPROPION HYDROCHLORIDE 300 MG: 300 TABLET, FILM COATED, EXTENDED RELEASE ORAL at 08:40

## 2021-04-10 RX ADMIN — VENLAFAXINE HYDROCHLORIDE 225 MG: 75 CAPSULE, EXTENDED RELEASE ORAL at 08:40

## 2021-04-10 RX ADMIN — HYDROXYZINE HYDROCHLORIDE 25 MG: 25 TABLET, FILM COATED ORAL at 00:05

## 2021-04-10 RX ADMIN — IBUPROFEN 600 MG: 600 TABLET, FILM COATED ORAL at 05:38

## 2021-04-10 RX ADMIN — SODIUM CHLORIDE: 9 INJECTION, SOLUTION INTRAVENOUS at 08:39

## 2021-04-10 RX ADMIN — AMPICILLIN SODIUM AND SULBACTAM SODIUM 3 G: 2; 1 INJECTION, POWDER, FOR SOLUTION INTRAMUSCULAR; INTRAVENOUS at 12:24

## 2021-04-10 RX ADMIN — RAMELTEON 8 MG: 8 TABLET ORAL at 21:28

## 2021-04-10 RX ADMIN — ROPINIROLE HYDROCHLORIDE 1 MG: 1 TABLET, FILM COATED ORAL at 21:28

## 2021-04-10 RX ADMIN — QUETIAPINE FUMARATE 300 MG: 300 TABLET, EXTENDED RELEASE ORAL at 08:40

## 2021-04-10 RX ADMIN — AMPICILLIN SODIUM AND SULBACTAM SODIUM 3 G: 2; 1 INJECTION, POWDER, FOR SOLUTION INTRAMUSCULAR; INTRAVENOUS at 00:01

## 2021-04-10 NOTE — PROGRESS NOTES
"D: Pt  A/O x3. VSS . Lungs- CL, no c/o chest pain/ SOB. sats=91  % RA.  . Bowel+ passing gas. Pt is OBESE. PP- +. +2 general  Edema.RT EYE swollen but resolving. CMS- intact. Pt taking PO REG food/ fluids well. Voiding Good amounts in BR.Pt up walks with SBA Pain/CAPA -denies. Pt has flat affect and Hx of cutting. Pt states auditory hallucination voice stating \"ITS TIME TO DIE\". Pt has 1;1 sitter for safety.  Pt is on a court hold.  A: con't to monitor. Call light in reach. Pt able to make needs known.Plan to discharge back to psych.    "

## 2021-04-10 NOTE — PROGRESS NOTES
Patient was seen with staff.    She reports improvement in her right facial pain and swelling.  She is eating and drinking without difficulty.    She has been ambulating in the covarrubias without difficulty.    Last bowel movement reported to be April 6    Afebrile  Blood pressure 110s/  Heart rate 80s to 90s  O2 sats 90s room air    Patient is alert, more interactive.  She appears comfortable  Significant improvement in redness and swelling right face and improvement in edema right upper eyelid.  Mild tenderness right submandibular area, no palpable mass.  Lungs clear  CV regular rhythm  Abdomen nondistended      Assessment/plan    Sepsis on admission, secondary to acute R parotitis, with secondary facial cellulitis, likely not related to dental process.  Pain, redness and swelling appear significantly improved over the last 24-48 hours  Vancomycin discontinued 4/9; Pt remains on empiric Unasyn  Plan continue Unasyn. prob transition to Augmentin in 1-2 days  Repeat CRP in am    Constipation  Related to meds  Benign abd exam  Plan bowel regimen    Suicidal ideation  Borderline personality disorder  MDD, recurrent, severe with psychotic features  Generalized anxiety disorder  She was admitted to inpatient behavioral health under commitment MI with [facility] after provisional discharge was revoked.   She was seen by psychiatry 2 days ago with plan to increase Lamictal to 100 mg daily today  Plan Attendant.  Transfer back to mental health when medically stable     Diet:   Regular  DVT Prophylaxis: Pneumatic Compression Devices  Allen Catheter: not present  Code Status:   FULL

## 2021-04-10 NOTE — PROGRESS NOTES
Patient A&O and able to make her needs known. Denied CP, lightheadedness, dizziness, numbness, tingling. Denied SOB/SALGADO and pain. Pt is drinking well and voiding spontaneously without difficulties. IV Abx infusing. Has 1:1 for safety. Flat affect and has a court hold order. Call light with in reach and will discharge to psych unit. Will continue with POC.

## 2021-04-10 NOTE — PLAN OF CARE
VS: /72 (BP Location: Left arm)   Pulse 87   Temp 96.5  F (35.8  C) (Oral)   Resp 17   SpO2 91%       Output: Voiding spontaneously without difficulty. LBM 4/6. BS active x4.      Lungs: CTA. O2: RA. Denies SOB, chest pain, cough.      Activity: Up independently in room.      Skin: WDL ex scratches on right and left forearms (Hx of scratching since 8th grade), and blanchable redness/swelling to R side of face.      Pain:    Managed with prn Tylenol Q4HR and ibuprofen Q6HR. Ibuprofen given x1 this shift.       Neuro/CMS:    A&Ox4, CMS intact, denies N/T   Dressing(s):    None   Diet:    Regular, tolerating sips of water throughout night.    LDA:    L PIV infusing 50 ml/hr NS   Equipment:    IV pole   Plan:    Monitor for S/S of SI and follow POC. Continue to administer abx for facial cellulitis. 1:1 sitter for SI.      Additional Info:    Atarax administered x1 for anxiety/itching   Actively suicidal - thoughts of cutting self

## 2021-04-11 LAB — CRP SERPL-MCNC: 93 MG/L (ref 0–8)

## 2021-04-11 PROCEDURE — 36415 COLL VENOUS BLD VENIPUNCTURE: CPT | Performed by: INTERNAL MEDICINE

## 2021-04-11 PROCEDURE — 250N000011 HC RX IP 250 OP 636: Performed by: PHYSICIAN ASSISTANT

## 2021-04-11 PROCEDURE — 250N000013 HC RX MED GY IP 250 OP 250 PS 637: Performed by: INTERNAL MEDICINE

## 2021-04-11 PROCEDURE — 99231 SBSQ HOSP IP/OBS SF/LOW 25: CPT | Performed by: INTERNAL MEDICINE

## 2021-04-11 PROCEDURE — 86140 C-REACTIVE PROTEIN: CPT | Performed by: INTERNAL MEDICINE

## 2021-04-11 PROCEDURE — 250N000011 HC RX IP 250 OP 636: Performed by: INTERNAL MEDICINE

## 2021-04-11 PROCEDURE — 250N000013 HC RX MED GY IP 250 OP 250 PS 637: Performed by: PHYSICIAN ASSISTANT

## 2021-04-11 PROCEDURE — 120N000002 HC R&B MED SURG/OB UMMC

## 2021-04-11 RX ORDER — SENNOSIDES 8.6 MG
2 TABLET ORAL 2 TIMES DAILY PRN
Status: ON HOLD | DISCHARGE
Start: 2021-04-11 | End: 2021-04-16

## 2021-04-11 RX ADMIN — AMPICILLIN SODIUM AND SULBACTAM SODIUM 3 G: 2; 1 INJECTION, POWDER, FOR SOLUTION INTRAMUSCULAR; INTRAVENOUS at 12:10

## 2021-04-11 RX ADMIN — QUETIAPINE FUMARATE 300 MG: 300 TABLET, EXTENDED RELEASE ORAL at 21:24

## 2021-04-11 RX ADMIN — POLYETHYLENE GLYCOL 3350 17 G: 17 POWDER, FOR SOLUTION ORAL at 08:18

## 2021-04-11 RX ADMIN — AMPICILLIN SODIUM AND SULBACTAM SODIUM 3 G: 2; 1 INJECTION, POWDER, FOR SOLUTION INTRAMUSCULAR; INTRAVENOUS at 00:17

## 2021-04-11 RX ADMIN — PROTRIPTYLINE HYDROCHLORIDE 10 MG: 10 TABLET ORAL at 21:24

## 2021-04-11 RX ADMIN — LAMOTRIGINE 100 MG: 100 TABLET ORAL at 08:18

## 2021-04-11 RX ADMIN — VENLAFAXINE HYDROCHLORIDE 225 MG: 75 CAPSULE, EXTENDED RELEASE ORAL at 08:19

## 2021-04-11 RX ADMIN — AMOXICILLIN AND CLAVULANATE POTASSIUM 1 TABLET: 875; 125 TABLET, FILM COATED ORAL at 20:17

## 2021-04-11 RX ADMIN — AMPICILLIN SODIUM AND SULBACTAM SODIUM 3 G: 2; 1 INJECTION, POWDER, FOR SOLUTION INTRAMUSCULAR; INTRAVENOUS at 06:28

## 2021-04-11 RX ADMIN — RAMELTEON 8 MG: 8 TABLET ORAL at 21:24

## 2021-04-11 RX ADMIN — BUPROPION HYDROCHLORIDE 300 MG: 300 TABLET, FILM COATED, EXTENDED RELEASE ORAL at 08:17

## 2021-04-11 RX ADMIN — ROPINIROLE HYDROCHLORIDE 1 MG: 1 TABLET, FILM COATED ORAL at 21:24

## 2021-04-11 RX ADMIN — QUETIAPINE FUMARATE 300 MG: 300 TABLET, EXTENDED RELEASE ORAL at 08:18

## 2021-04-11 NOTE — PLAN OF CARE
Pt is alert and oriented. Denied pain. Noted to auditory hallucinations telling her to harm self. Voiding well without difficulty. abx infused at ordered. Flat affect, court hold ordered.  1:1 sitter at bedside. Able to make needs known. No complaints. Will continue with plan of care.

## 2021-04-11 NOTE — PROGRESS NOTES
"Pt was seen, course reviewed with team    Pt notes marked improvement in R facial swelling today  No difficulty swallowing  No abd pain, diarrhea  + bowel movement yesterday      Afebrile VSS  Alert, more interactive  Minimal swelling R cheek   Peeling of skin over eye and cheek  Parotid gland is not palpable  Lungs clear  CV rrr  Abd soft      Assessment/plan  Sepsis on admission due to  R facial cellulitis with underlying R parotitis, markedly improved with empiric antibiotics ( Vanco and Zosyn), now Zosyn      Suicidal ideation  Borderline personality disorder  MDD, recurrent, severe with psychotic features  Generalized anxiety disorder  She was admitted to inpatient behavioral health under commitment MI with [facility] after provisional discharge was revoked.   She was seen by psychiatry 2 days ago with plan to increase Lamictal to 100 mg daily today. Has described occ auditory hallucinations, including a voice stating \"It's time to die\"  Plan Attendant.  Transfer back to mental health when medically stable, hopefully tomorrow       "

## 2021-04-11 NOTE — PROGRESS NOTES
"  VS:    T: 97.6  Respiratory: 19  Pulse: 98  /53  O2: 95 on room air  Pain: denies   Output:    Voiding spontaneously without difficulty.  Pt. Reported BM on 4/10, active bowel sounds   Activity:    Up as tolerated, walked in covarrubias with 1:1 . Independent in room.   Skin: WDL except healing scratches on right and left forearm, redness and peeling skin on Right side of face.   Pain:    Denies pain   Neuro/CMS:    A&Ox4, CMS intact, denies N/T, denies numbness and tingling.   Dressing(s):     None   Diet:    Regular diet, encouraged fluids   Equipment:     IV Pole   IV's/Drains:    Peripheral IV on Anterior Right Hand, Saline locked   Plan:     Monitoring S/S or SI and follow POC. Pt. Has 1:1  for SI.    Additional Info:     Patient reports auditory hallucinations, states they are \"quieter\" today. Call light within reach.         .    "

## 2021-04-11 NOTE — PLAN OF CARE
"  VS: /68 (BP Location: Left arm)   Pulse 85   Temp 97.5  F (36.4  C) (Oral)   Resp 18   SpO2 96%    Denies chest pain and SOB  Alert and oriented  SI. Denied suicidal thoughts when assessed. Has sitter at bedside   O2: >90% on RA   Output: Voids via toilet   Last BM: 4/10/21  Bowel sounds active   Activity: Independent   Skin: Edema and redness to R eye, scratches to bilateral forearms  Pt refused full skin assessment   Pain: Denies   CMS: Intact. Denies numbness and tingling   Dressing: PIV dressing - CDI   Diet: Regular   LDA: PIV R hand - SL   Equipment: IV pole, sitter at bedside   Plan: Continue to monitor. \"Transfer back to mental health when medically stable, hopefull tomorrow.\" per MD note   Additional Info:       "

## 2021-04-12 ENCOUNTER — TELEPHONE (OUTPATIENT)
Dept: BEHAVIORAL HEALTH | Facility: CLINIC | Age: 36
End: 2021-04-12

## 2021-04-12 ENCOUNTER — HOSPITAL ENCOUNTER (INPATIENT)
Facility: CLINIC | Age: 36
LOS: 8 days | Discharge: OTHER INSTITUTION | End: 2021-04-20
Attending: PSYCHIATRY & NEUROLOGY | Admitting: PSYCHIATRY & NEUROLOGY
Payer: COMMERCIAL

## 2021-04-12 VITALS
OXYGEN SATURATION: 95 % | DIASTOLIC BLOOD PRESSURE: 71 MMHG | SYSTOLIC BLOOD PRESSURE: 119 MMHG | HEART RATE: 92 BPM | RESPIRATION RATE: 16 BRPM | TEMPERATURE: 96.1 F

## 2021-04-12 DIAGNOSIS — G25.81 RESTLESS LEG SYNDROME: ICD-10-CM

## 2021-04-12 DIAGNOSIS — F60.3 BORDERLINE PERSONALITY DISORDER (H): ICD-10-CM

## 2021-04-12 DIAGNOSIS — L03.211 FACIAL CELLULITIS: Primary | ICD-10-CM

## 2021-04-12 DIAGNOSIS — F32.3 MAJOR DEPRESSIVE DISORDER, SINGLE EPISODE, SEVERE WITH PSYCHOTIC FEATURES (H): ICD-10-CM

## 2021-04-12 DIAGNOSIS — K59.09 OTHER CONSTIPATION: ICD-10-CM

## 2021-04-12 DIAGNOSIS — F41.1 GENERALIZED ANXIETY DISORDER: ICD-10-CM

## 2021-04-12 DIAGNOSIS — F51.01 PRIMARY INSOMNIA: ICD-10-CM

## 2021-04-12 PROCEDURE — 99238 HOSP IP/OBS DSCHRG MGMT 30/<: CPT | Performed by: INTERNAL MEDICINE

## 2021-04-12 PROCEDURE — 250N000013 HC RX MED GY IP 250 OP 250 PS 637: Performed by: NURSE PRACTITIONER

## 2021-04-12 PROCEDURE — 99223 1ST HOSP IP/OBS HIGH 75: CPT | Mod: AI | Performed by: NURSE PRACTITIONER

## 2021-04-12 PROCEDURE — 124N000002 HC R&B MH UMMC

## 2021-04-12 PROCEDURE — 250N000013 HC RX MED GY IP 250 OP 250 PS 637: Performed by: PHYSICIAN ASSISTANT

## 2021-04-12 PROCEDURE — 250N000013 HC RX MED GY IP 250 OP 250 PS 637: Performed by: INTERNAL MEDICINE

## 2021-04-12 RX ORDER — LAMOTRIGINE 25 MG/1
100 TABLET ORAL EVERY MORNING
Status: DISCONTINUED | OUTPATIENT
Start: 2021-04-13 | End: 2021-04-16

## 2021-04-12 RX ORDER — SENNOSIDES 8.6 MG
2 TABLET ORAL 2 TIMES DAILY PRN
Status: DISCONTINUED | OUTPATIENT
Start: 2021-04-12 | End: 2021-04-20 | Stop reason: HOSPADM

## 2021-04-12 RX ORDER — OLANZAPINE 10 MG/2ML
10 INJECTION, POWDER, FOR SOLUTION INTRAMUSCULAR 2 TIMES DAILY
Status: DISCONTINUED | OUTPATIENT
Start: 2021-04-12 | End: 2021-04-20 | Stop reason: HOSPADM

## 2021-04-12 RX ORDER — ACETAMINOPHEN 325 MG/1
650 TABLET ORAL EVERY 4 HOURS PRN
Status: DISCONTINUED | OUTPATIENT
Start: 2021-04-12 | End: 2021-04-20 | Stop reason: HOSPADM

## 2021-04-12 RX ORDER — ROPINIROLE 1 MG/1
1 TABLET, FILM COATED ORAL AT BEDTIME
Status: DISCONTINUED | OUTPATIENT
Start: 2021-04-12 | End: 2021-04-20 | Stop reason: HOSPADM

## 2021-04-12 RX ORDER — HYDROXYZINE HYDROCHLORIDE 25 MG/1
25 TABLET, FILM COATED ORAL EVERY 4 HOURS PRN
Status: DISCONTINUED | OUTPATIENT
Start: 2021-04-12 | End: 2021-04-20 | Stop reason: HOSPADM

## 2021-04-12 RX ORDER — OLANZAPINE 10 MG/2ML
10 INJECTION, POWDER, FOR SOLUTION INTRAMUSCULAR DAILY PRN
Status: DISCONTINUED | OUTPATIENT
Start: 2021-04-12 | End: 2021-04-20 | Stop reason: HOSPADM

## 2021-04-12 RX ORDER — POLYETHYLENE GLYCOL 3350 17 G/17G
17 POWDER, FOR SOLUTION ORAL DAILY
Status: DISCONTINUED | OUTPATIENT
Start: 2021-04-13 | End: 2021-04-20 | Stop reason: HOSPADM

## 2021-04-12 RX ORDER — CLONAZEPAM 0.5 MG/1
0.5 TABLET ORAL 2 TIMES DAILY PRN
Status: DISCONTINUED | OUTPATIENT
Start: 2021-04-12 | End: 2021-04-20 | Stop reason: HOSPADM

## 2021-04-12 RX ORDER — QUETIAPINE 300 MG/1
300 TABLET, FILM COATED, EXTENDED RELEASE ORAL 2 TIMES DAILY
Status: DISCONTINUED | OUTPATIENT
Start: 2021-04-12 | End: 2021-04-20 | Stop reason: HOSPADM

## 2021-04-12 RX ORDER — PROTRIPTYLINE HYDROCHLORIDE 10 MG/1
10 TABLET, FILM COATED ORAL AT BEDTIME
Status: DISCONTINUED | OUTPATIENT
Start: 2021-04-12 | End: 2021-04-20 | Stop reason: HOSPADM

## 2021-04-12 RX ORDER — BUPROPION HYDROCHLORIDE 150 MG/1
300 TABLET ORAL DAILY
Status: DISCONTINUED | OUTPATIENT
Start: 2021-04-13 | End: 2021-04-20 | Stop reason: HOSPADM

## 2021-04-12 RX ORDER — OLANZAPINE 10 MG/1
10 TABLET ORAL DAILY PRN
Status: DISCONTINUED | OUTPATIENT
Start: 2021-04-12 | End: 2021-04-20 | Stop reason: HOSPADM

## 2021-04-12 RX ORDER — RAMELTEON 8 MG/1
8 TABLET ORAL AT BEDTIME
Status: DISCONTINUED | OUTPATIENT
Start: 2021-04-12 | End: 2021-04-20 | Stop reason: HOSPADM

## 2021-04-12 RX ORDER — QUETIAPINE FUMARATE 25 MG/1
25 TABLET, FILM COATED ORAL 3 TIMES DAILY PRN
Status: DISCONTINUED | OUTPATIENT
Start: 2021-04-12 | End: 2021-04-20 | Stop reason: HOSPADM

## 2021-04-12 RX ORDER — SALIVA STIMULANT COMB. NO.3
2 SPRAY, NON-AEROSOL (ML) MUCOUS MEMBRANE 4 TIMES DAILY PRN
Status: DISCONTINUED | OUTPATIENT
Start: 2021-04-12 | End: 2021-04-14

## 2021-04-12 RX ADMIN — PROTRIPTYLINE HYDROCHLORIDE 10 MG: 10 TABLET ORAL at 21:05

## 2021-04-12 RX ADMIN — ROPINIROLE HYDROCHLORIDE 1 MG: 1 TABLET, FILM COATED ORAL at 21:05

## 2021-04-12 RX ADMIN — RAMELTEON 8 MG: 8 TABLET ORAL at 21:05

## 2021-04-12 RX ADMIN — QUETIAPINE FUMARATE 300 MG: 300 TABLET, EXTENDED RELEASE ORAL at 21:05

## 2021-04-12 RX ADMIN — VENLAFAXINE HYDROCHLORIDE 225 MG: 75 CAPSULE, EXTENDED RELEASE ORAL at 08:57

## 2021-04-12 RX ADMIN — AMOXICILLIN AND CLAVULANATE POTASSIUM 1 TABLET: 875; 125 TABLET, FILM COATED ORAL at 21:05

## 2021-04-12 RX ADMIN — POLYETHYLENE GLYCOL 3350 17 G: 17 POWDER, FOR SOLUTION ORAL at 08:57

## 2021-04-12 RX ADMIN — QUETIAPINE FUMARATE 300 MG: 300 TABLET, EXTENDED RELEASE ORAL at 08:57

## 2021-04-12 RX ADMIN — AMOXICILLIN AND CLAVULANATE POTASSIUM 1 TABLET: 875; 125 TABLET, FILM COATED ORAL at 08:57

## 2021-04-12 RX ADMIN — BUPROPION HYDROCHLORIDE 300 MG: 300 TABLET, FILM COATED, EXTENDED RELEASE ORAL at 08:57

## 2021-04-12 RX ADMIN — LAMOTRIGINE 100 MG: 100 TABLET ORAL at 08:57

## 2021-04-12 ASSESSMENT — ACTIVITIES OF DAILY LIVING (ADL)
HYGIENE/GROOMING: INDEPENDENT
DRESSING/BATHING_DIFFICULTY: NO
DIFFICULTY_COMMUNICATING: NO
HEARING_DIFFICULTY_OR_DEAF: NO
WALKING_OR_CLIMBING_STAIRS_DIFFICULTY: NO
FALL_HISTORY_WITHIN_LAST_SIX_MONTHS: NO
CONCENTRATING,_REMEMBERING_OR_MAKING_DECISIONS_DIFFICULTY: NO
WEAR_GLASSES_OR_BLIND: NO
ORAL_HYGIENE: INDEPENDENT
DIFFICULTY_EATING/SWALLOWING: NO
DRESS: INDEPENDENT
LAUNDRY: WITH SUPERVISION
DOING_ERRANDS_INDEPENDENTLY_DIFFICULTY: NO
TOILETING_ISSUES: NO

## 2021-04-12 NOTE — PLAN OF CARE
Pt is stable. Denied pain. 1:1 sitter at bedside. Independent in room. Redness to face is healing/looking better. No complaints, slept most of the night. Able to make her needs known. Will continue to monitor.

## 2021-04-12 NOTE — PLAN OF CARE
"/71 (BP Location: Left arm)   Pulse 92   Temp 96.1  F (35.6  C) (Oral)   Resp 16   SpO2 95%     Aox4. CMS intact. Voiding spontaneously. Denies pain. Denies CP/SOB. LS clear. PERRLA. IND in room.   Ate a good breakfast. Takes meds whole. 1:1 attendant at bedside, pt has [facility] and is on SI precautions.     Endorses SI with auditory hallucinations stating that she \"is worthless\" and \"doesn't deserve to live\" and that \"it's time to die.\" The patient, however, reports that instead of the voices shouting these statements at her they have \"quieted down some.\" CRP trending down, 93.0 this AM. R side of face very ft pink, skin dry/flaky with trace edema, improving per pt. Skin intact. PIV removed prior to transfer back to [facility]. .   Report called at 0950, [facility] requested callback. Writer called back at 1000, handoff given to LANA Laboy. Pt transferred via transport to [facility] at 1020 with belongings. Security as escort.   "

## 2021-04-12 NOTE — H&P
History and Physical    Misty Parham MRN# [id number]   Age: 35 year old YOB: 1985     Date of Admission:  4/12/2021          Contacts:     PCP - Kathleen Spencer - [facility]     Psychiatry - Dr. Hernández - [facility]  [address]     [place]  Ph:  [phone]    Fx:  [phone]     Therapy - Anderson Moss, [facility] - [facility]  [address]     [place]  Ph:  [phone]    Fx:  [phone]     [facility]  - Brinda Machuca ([phone] / fax [phone])            Diagnoses:     Borderline personality disorder  Major depressive disorder, recurrent, severe with psychotic features  Generalized anxiety disorder  Right facial cellulitis with underlying parotitis, treatment with antibiotics         Recommendations:     Admit to Unit: 32N    Attending Physician: Dr. Hargrove, under the direct care of Hina Morris NP    Patient is currently under civil commitment MI with [facility] in [facility] until 5/26/2021.  The limit on her commitment extension has been reached, so a new petition for commitment MI with [facility] has been filed.  Current [facility] medications are Zyprexa, Seroquel, Latuda and Abilify.  She also has a current Rob[facility] Sr order, but this it not being pursued during her new commitment.      Monitor for target symptoms.     Provide a safe environment and therapeutic milieu.     Medications.  Continue Lamictal titration.  Continue Seroquel  mg BID with a back up of IM Zyprexa per [facility].  Continue Protriptyline 10 mg at HS.  Continue Wellbutrin  mg daily.  Continue Effexor  mg daily.  Continue Rozerem 8 mg at HS.  Continue PRNs of Klonopin, Hydroxyzine, Seroquel and Zyprexa.      Attestation:  Patient has been seen and evaluated by me, Nissa Morris, APRN CNP  The patient was counseled on nature of illness and treatment plan/options  Care was coordinated with treatment  "team          Chief Complaint:     History is obtained from the patient and electronic health record.      \"Channing was mad because he wanted me to die from the infection.\"          History of Present Illness:        Misty Parham is a 35-year-old female admitted to [address] on 4/12/2021.  She was admitted under MI commitment with [facility] from [address] [place] [address] where she had been hospitalized since 4/7/2021 due to parotitis.  She had been hospitalized on the psychiatric unit since 2/7/2021 after taking 4-5 tabs of Unisom, 4-5 tabs of Ibuprofen and 4-5 tabs of Tylenol twice daily for 3 months in an attempt to commit suicide.  She was experiencing auditory hallucinations of Channing's voice commanding her to commit suicide.  She had not been taking medications for several weeks prior to admission.      During her hospitalization, her provisional discharge was revoked.  She began refusing medications 2/23 and took them inconsistently until 3/20.  She took them consistently thereafter.    She was spending nearly all of her time in her room and showed little motivation for treatment, so a room lock out was initiated as it has been during previous hospitalizations, from 9:30 AM - 12 PM, 1 PM - 3 PM and 5 PM to 9 PM.  She spent some time in the milieu reading and watching TV but did not attend any groups as she stated she had been to them during previous hospitalizations and did not find them beneficial.  She intermittently superficially scratched her forearms as a form of self-injury without suicidal intent.  A finger foods diet was ordered.  Staff regularly conducted room searches to remove potentially harmful objects from her room, as she often hid foil juice tops and plasticware.  On 3/25 she reported she had attempted to strangle herself with linens on 3/23 and 3/24, and linens were removed from her room.  As of 4/6 she contracted for safety with her linens.  She remained in a room directly across from the desk " "throughout her hospitalization.  Individual therapy was initiated shortly before her discharge to medical and she stated that it was helpful.     Her mother indicated that the patient is not welcome to reside with her.  Historically she did not put forth efforts to engage in treatment during IRTS placement.  [facility]I funding and adult foster care placement was pursued.  [facility] interview was completed 3/16. She had an interview with [facility] 4/1/21 and has [facility] funding in place.      She was readmitted to the psychiatric unit following treatment with IV antibiotics and medical stabilization.            Psychiatric Review of Systems:      Her mood is depressed.  She reports suicidal ideation with a plan to strangle herself.  She contracts for safety on the unit.  She reports urges to engage in self-injury by scratching herself.  She has anhedonia.  Concentration is\"pretty good.\"  Energy and motivation are low.  She has feelings of hopelessness, helplessness, worthlessness and guilt.   Her appetite is normal.  Her sleep is \"kind of rough\" with frequent wakening.  Her anxiety is \"up and down\" and higher when she is around other people.  She denies irritability.  She has some racing thoughts.  She reports auditory hallucinations of Satan's voice, fairly constant but at the volume of a whisper, instructing her not to take medications, to commit suicide, and making derogatory comments. She denies symptoms of OCD and jey.  She denies homicidal ideation.           Medical Review of Systems:     A 10-point review of systems was completed and is negative with the exception of HPI.            Psychiatric History:     Psychiatric Hospitalizations:  Several, most recently 6/2020 and 2/2021 - 4/2021    Prior ECT: She had ECT in 2/2020 - 4/2020 but had cognitive impairment with a MoCA score of 9/30 and improvements in symptoms noted only on treatment days    Court Commitment:  Under current commitment     Suicide Attempts:  " Twice, both via overdose    Self-injurious Behavior:  Scratching self     Violence toward others:  Denies    Use of Psychotropics:  She has tried numerous medications, some of which have included Zoloft, Prozac, Lexapro, Paxil, Effexor, Pristiq, Remeron, Wellbutrin, Trintellix, lithium (caused tremor), Lamictal, Rexulti, protriptyline and nortriptyline          Substance Use History:     No history of substance abuse.  She does not consume alcohol, nor dose she use nicotine.            Past Medical History:     Parotitis          Past Surgical History:     Cholecystectomy         Allergies:      No known allergies           Medications:       acetaminophen (TYLENOL) 325 MG tablet Take 2 tablets (650 mg) by mouth every 4 hours as needed for mild pain (to moderate pain)     amoxicillin-clavulanate (AUGMENTIN) 875-125 MG tablet Take 1 tablet by mouth every 12 hours for 6 days     buPROPion (WELLBUTRIN XL) 300 MG 24 hr tablet Take 1 tablet (300 mg) by mouth daily     clonazePAM (KLONOPIN) 0.5 MG tablet Take 0.5 mg by mouth 2 times daily as needed for anxiety     hydrOXYzine (ATARAX) 25 MG tablet Take 1 tablet (25 mg) by mouth every 4 hours as needed for anxiety     lamoTRIgine (LAMICTAL) 100 MG tablet Take 1 tablet (100 mg) by mouth every morning beginning 4/9/2021.     OLANZapine (ZYPREXA) 10 MG tablet Take 1 tablet (10 mg) by mouth 3 times daily as needed (agitation associated with psychosis)     OLANZapine (ZYPREXA) injection Inject 10 mg into the muscle 3 times daily as needed for agitation (associated with psychosis)     OLANZapine (ZYPREXA) injection Inject 10 mg into the muscle 2 times daily .  Jarvised.  Administer IM Zyprexa if she refuses PO Seroquel XR.     polyethylene glycol (MIRALAX) 17 g packet Take 17 g by mouth daily     protriptyline (VIVACTIL) 10 MG tablet Take 1 tablet (10 mg) by mouth At Bedtime     QUEtiapine (SEROQUEL) 25 MG tablet Take 1 tablet (25 mg) by mouth 3 times daily as needed (psychosis)      QUEtiapine ER (SEROQUEL XR) 300 MG 24 hr tablet Take 1 tablet (300 mg) by mouth 2 times daily .  Jarvised.  Administer IM Zyprexa if she refuses PO Seroquel XR.     ramelteon (ROZEREM) 8 MG tablet Take 1 tablet (8 mg) by mouth At Bedtime     rOPINIRole (REQUIP) 1 MG tablet Take 1 tablet (1 mg) by mouth At Bedtime     sennosides (SENOKOT) 8.6 MG tablet Take 2 tablets by mouth 2 times daily as needed for constipation     venlafaxine (EFFEXOR-XR) 75 MG 24 hr capsule Take 3 capsules (225 mg) by mouth daily (with breakfast)            Social History:     Upbringing:  Grew up in [place], raised by mother, has 3 siblings    Educational History:  High school    Relationships:  ; remains in contact with her ex who is supportive    Children:  She has 2 sons who live with family and are reportedly going to [facility] for 1 year soon    Current Living Situation: She was staying with her mother but is unwelcome to return    Occupational History:  Unemployed    Financial Support:  Family and SSDI    Legal History:  Denies    Abuse/Trauma History:  Denies          Family History:     Her mother has depression and alcohol use disorder.  Her father possibly has substance use and mental health issues.  Her oldest son has mental illness.  No family history of suicides.           Labs:      Ref. Range 4/7/2021 08:28 4/7/2021 15:02 4/8/2021 07:27 4/8/2021 16:28 4/9/2021 07:24 4/9/2021 09:50 4/11/2021 06:48   Sodium Latest Ref Range: 133 - 144 mmol/L 134  138  141     Potassium Latest Ref Range: 3.4 - 5.3 mmol/L 3.7  3.6  4.0     Chloride Latest Ref Range: 94 - 109 mmol/L 104  109  111 (H)     Carbon Dioxide Latest Ref Range: 20 - 32 mmol/L 22  21  23     Urea Nitrogen Latest Ref Range: 7 - 30 mg/dL 11  12  10     Creatinine Latest Ref Range: 0.52 - 1.04 mg/dL 0.75  0.75  0.64     GFR Estimate Latest Ref Range: >60 mL/min/1.73_m2 >90  >90  >90     GFR Estimate If Black Latest Ref Range: >60 mL/min/1.73_m2 >90  >90  >90      Calcium Latest Ref Range: 8.5 - 10.1 mg/dL 8.9  7.9 (L)  7.9 (L)     Anion Gap Latest Ref Range: 3 - 14 mmol/L 8  8  7     CRP Inflammation Latest Ref Range: 0.0 - 8.0 mg/L 69.0 (H)  220.0 (H)  200.0 (H)  93.0 (H)   Lactate for Sepsis Protocol Latest Ref Range: 0.7 - 2.0 mmol/L  1.2  1.0      Glucose Latest Ref Range: 70 - 99 mg/dL 109 (H)  104 (H)  95     WBC Latest Ref Range: 4.0 - 11.0 10e9/L 14.5 (H)  13.5 (H)  10.5     Hemoglobin Latest Ref Range: 11.7 - 15.7 g/dL 12.3  10.3 (L)  9.9 (L)     Hematocrit Latest Ref Range: 35.0 - 47.0 % 37.2  31.3 (L)  30.4 (L)     Platelet Count Latest Ref Range: 150 - 450 10e9/L 304  206  241     RBC Count Latest Ref Range: 3.8 - 5.2 10e12/L 4.23  3.57 (L)  3.44 (L)     MCV Latest Ref Range: 78 - 100 fl 88  88  88     MCH Latest Ref Range: 26.5 - 33.0 pg 29.1  28.9  28.8     MCHC Latest Ref Range: 31.5 - 36.5 g/dL 33.1  32.9  32.6     RDW Latest Ref Range: 10.0 - 15.0 % 13.9  14.1  14.4     Diff Method Unknown   Automated Method  Automated Method     % Neutrophils Latest Units: %   87.1  77.7     % Lymphocytes Latest Units: %   6.8  11.5     % Monocytes Latest Units: %   4.8  7.4     % Eosinophils Latest Units: %   0.3  1.9     % Basophils Latest Units: %   0.3  0.5     % Immature Granulocytes Latest Units: %   0.7  1.0     Nucleated RBCs Latest Ref Range: 0 /100   0  0     Absolute Neutrophil Latest Ref Range: 1.6 - 8.3 10e9/L   11.7 (H)  8.1     Absolute Lymphocytes Latest Ref Range: 0.8 - 5.3 10e9/L   0.9  1.2     Absolute Monocytes Latest Ref Range: 0.0 - 1.3 10e9/L   0.6  0.8     Absolute Eosinophils Latest Ref Range: 0.0 - 0.7 10e9/L   0.0  0.2     Absolute Basophils Latest Ref Range: 0.0 - 0.2 10e9/L   0.0  0.1     Abs Immature Granulocytes Latest Ref Range: 0 - 0.4 10e9/L   0.1  0.1     Absolute Nucleated RBC Unknown   0.0  0.0     METHICILLIN RESIST/SENS S. AUREUS PCR Unknown      Rpt    Specimen Description Unknown      Nares    Vancomycin Level Latest Units: mg/L     "19.7               Psychiatric Examination:     Appearance:  alert, adequate grooming/hygiene   Attitude:  cooperative   Eye Contact:  good  Mood: depressed, anxiety is \"up and down\"  Affect:  intensity is blunted but slightly more range  Speech:  quiet, soft  Language: fluent and intact in English  Psychomotor, Gait, Musculoskeletal:  no evidence of tardive dyskinesia, dystonia, or tics  Thought Process:  goal oriented, linear, less than logical  Associations:  no loose associations  Thought Content:  denies homicidal ideation, denies visual hallucinations, reports auditory hallucinations of Channing's voice commanding her to commit suicide and making derogatory comments, endorses suicidal ideation with a plan to strangle herself and contracts for safety on the unit, endorses urges to cut self as a form of self-injury but not with suicidal intent  Insight:  limited  Judgement:  limited  Oriented to:  month/year, time, person, and place   Attention Span and Concentration:  fair, improving  Recent and Remote Memory:  intact  Fund of Knowledge:  appropriate    BP 91/69   Pulse 98   Temp 98.3  F (36.8  C) (Oral)   Resp 17   SpO2 95%          Physical Exam:     Please refer to the physical exam completed by Dr. Rivero on  medical unit 4/12/2021.  "

## 2021-04-12 NOTE — PROGRESS NOTES
04/12/21 1046   Patient Belongings   Patient Belongings locker   Patient Belongings Put in Hospital Secure Location (Security or Locker, etc.) cell phone/electronics;clothing;shoes;plastic bag;wallet   Belongings Search Yes   Clothing Search Yes   Second Staff Lety AARON Rn     IN LOCKER #4:  2 jackets 1 black/1 navy blue  Black watch  2 books  Plaid wallet ( Ins card & Id)  Blk t shirt  Green pants   Navy shoes   Green iphone  A               Admission:  I am responsible for any personal items that are not sent to the safe or pharmacy.  [facility] is not responsible for loss, theft or damage of any property in my possession.    Signature:  _________________________________ Date: _______  Time: _____                                              Staff Signature:  ____________________________ Date: ________  Time: _____      2nd Staff person, if patient is unable/unwilling to sign:    Signature: ________________________________ Date: ________  Time: _____     Discharge:  [facility] has returned all of my personal belongings:    Signature: _________________________________ Date: ________  Time: _____                                          Staff Signature:  ____________________________ Date: ________  Time: _____

## 2021-04-12 NOTE — PLAN OF CARE
Work Completed: reviewed chart. Pt has readmitted to station 32 after medical stabilization.      Discharge plan or goal: stabilize and place in .  Recommitment is being pursued.                Barriers to discharge: acuity of mental health sxs.  Pt was on medical and just transferred back to station 32

## 2021-04-12 NOTE — PHARMACY-ADMISSION MEDICATION HISTORY
Please see Admission Medication History note completed on 2/11/21 under previous encounter at [facility] for information regarding prior to admission medications.    Nicollette McMann, PharmD  [facility]: Ascom *[phone] or *[phone]  Emergency Department: Ascom *[phone]

## 2021-04-12 NOTE — PROGRESS NOTES
Care Management Discharge Note    Discharge Date: 04/12/21       Discharge Disposition:  Transfer back to 32N inpatient psych    Discharge Services:  N/A    Discharge DME:  N/A    Discharge Transportation:  Internal transfer    Private pay costs discussed: Not applicable    PAS Confirmation Code:  N/A  Patient/family educated on Medicare website which has current facility and service quality ratings:  N/A    Education Provided on the Discharge Plan:  Yes  Persons Notified of Discharge Plans: Pt, MD, 8A charge RN  Patient/Family in Agreement with the Plan:  Unable to assess    Handoff Referral Completed: No, internal transfer, not discharging from hospital     Additional Information:  Per team rounds, the pt's medical issues have resolved and the pt is transferring back to inpatient psych 32 N (the same station she was hospitalized at prior).     SW emailed the pt's  to provide update, contact below.       Samreen Machuca  [facility]  [phone] [place]  [phone] [email or web address]       ANURAG Varela  [facility]  5 Ortho & 8A   Ph: [phone]  Pager: [phone]

## 2021-04-12 NOTE — TELEPHONE ENCOUNTER
S:  8:35 AM  Call from 8A requesting IP MH placement(transfer) for a 36 YO F    B: Hx of Borderline Personality Disorder, MDD, rec/severe w/ psychotic features and IVY.  Pt originally admitted to station 32 on 02/10/21 w/  psychosis, CAH and SI.  Pt transferred to station 8A after developing facial cellulitis.  She has completed IV abx and is now on oral abx. Pt has been pleasant,  Appropriate, continues to feel down and hopeless and still hearing the voice of satelizabeth commanding her to harm herself. 1:1currently. Petition for recommitment  paperwork  submitted, awaiting date of court hearing. Patient medically clear, back to baseline and ready to return to station 32.   Dr Rivero is attending at [phone].      A:  commitment w/ [facility]    R:  Patient cleared and ready for behavioral bed placement: Yes

## 2021-04-12 NOTE — PROGRESS NOTES
Pt. Is a 35 year old female readmitted from medical unit due to cellulitis.  Prior to that the pt. has been on this unit since 2/10/21. Pt. Is on a full commitment with [facility] which expires in May. A petition for recommitment was submitted on 4/6/21.  Pt. Is pleasant and cooperative on approach.  Pt. denies suicidal ideation at this time.  Pt. Appears comfortable on the unit, reading a book.  Pt. reports that her cellulitis is much better and the discomfort/pain due to it has diminished greatly.  Pt. states that her seemingly chronic auditory hallucination is diminished at a 4 with 10 being high. Pt. reports no urges currently for self-injurious behavior. Pt.' behavior assessment completed.

## 2021-04-13 LAB
BACTERIA SPEC CULT: NO GROWTH
BACTERIA SPEC CULT: NO GROWTH
SPECIMEN SOURCE: NORMAL
SPECIMEN SOURCE: NORMAL

## 2021-04-13 PROCEDURE — 99232 SBSQ HOSP IP/OBS MODERATE 35: CPT | Performed by: NURSE PRACTITIONER

## 2021-04-13 PROCEDURE — 250N000013 HC RX MED GY IP 250 OP 250 PS 637: Performed by: NURSE PRACTITIONER

## 2021-04-13 PROCEDURE — 124N000002 HC R&B MH UMMC

## 2021-04-13 RX ADMIN — VENLAFAXINE HYDROCHLORIDE 225 MG: 150 CAPSULE, EXTENDED RELEASE ORAL at 08:07

## 2021-04-13 RX ADMIN — Medication 2 SPRAY: at 10:13

## 2021-04-13 RX ADMIN — ROPINIROLE HYDROCHLORIDE 1 MG: 1 TABLET, FILM COATED ORAL at 21:25

## 2021-04-13 RX ADMIN — RAMELTEON 8 MG: 8 TABLET ORAL at 21:24

## 2021-04-13 RX ADMIN — AMOXICILLIN AND CLAVULANATE POTASSIUM 1 TABLET: 875; 125 TABLET, FILM COATED ORAL at 21:24

## 2021-04-13 RX ADMIN — QUETIAPINE FUMARATE 300 MG: 300 TABLET, EXTENDED RELEASE ORAL at 08:07

## 2021-04-13 RX ADMIN — POLYETHYLENE GLYCOL 3350 17 G: 17 POWDER, FOR SOLUTION ORAL at 08:07

## 2021-04-13 RX ADMIN — QUETIAPINE FUMARATE 300 MG: 300 TABLET, EXTENDED RELEASE ORAL at 21:24

## 2021-04-13 RX ADMIN — BUPROPION HYDROCHLORIDE 300 MG: 150 TABLET, EXTENDED RELEASE ORAL at 08:07

## 2021-04-13 RX ADMIN — PROTRIPTYLINE HYDROCHLORIDE 10 MG: 10 TABLET ORAL at 21:24

## 2021-04-13 RX ADMIN — LAMOTRIGINE 100 MG: 25 TABLET ORAL at 08:07

## 2021-04-13 RX ADMIN — AMOXICILLIN AND CLAVULANATE POTASSIUM 1 TABLET: 875; 125 TABLET, FILM COATED ORAL at 08:07

## 2021-04-13 ASSESSMENT — ACTIVITIES OF DAILY LIVING (ADL)
LAUNDRY: WITH SUPERVISION
LAUNDRY: WITH SUPERVISION
DRESS: INDEPENDENT
DRESS: INDEPENDENT
HYGIENE/GROOMING: INDEPENDENT
ORAL_HYGIENE: INDEPENDENT
ORAL_HYGIENE: INDEPENDENT
HYGIENE/GROOMING: INDEPENDENT

## 2021-04-13 NOTE — PLAN OF CARE
Patient has been resting in bed for part of the evening. Room locked at 5 PM and patient complied with request to be present in the milieu. Said she is hearing voices and the voices are telling her its time to go and to kill herself. Denied having any actual thoughts of suicide and is brandt for safety. Rated anxiety 6/10, depression 6/10 and denied pain. Once in the milieu patient kept to herself and read a book.  Continues on suicide and SIB precautions.  Compliant with mouth checks.

## 2021-04-13 NOTE — DISCHARGE SUMMARY
Admit Date:     04/07/2021   Discharge Date:     04/12/2021      HISTORY OF PRESENT ILLNESS:  Misty Parham is a 35-year-old female with history of depression, anxiety and borderline personality disorder who was transferred from the inpatient mental health unit to the medical unit for the treatment of right facial cellulitis.  The patient had a rapidly developed right facial edema and erythema over the preceding 12 hours as well as a fever and leukocytosis.        HOSPITAL COURSE:  The following medical concerns were addressed:   1.  Sepsis on admission, due to Right facial swelling and erythema consistent with cellulitis.  The patient was started empirically on Unasyn and IV vancomycin.  A CT scan of the face was obtained and revealed changes consistent with acute right-sided parotitis with secondary cellulitis.  The orbit was not involved.  Though the patient did have poor dentition with a possible dental abscess, this was felt not to be the cause for her facial cellulitis.  The patient was maintained on IV Unasyn and vancomycin, which was deescalated to Unasyn alone after a nasal swab for MRSA returned negative.  Unasyn was continued until the evening prior to discharge when Augmentin was started.  The patient noted gradual improvement in her right facial discomfort and swelling.  At the time of discharge, she had minimal swelling and erythema of her right cheek.  She had no difficulty swallowing.  Parotid gland, which became palpable and tender on the day after admission, was no longer palpable or tender at the time of discharge.  It is presumed that the right parotitis is related to desiccation of the oral mucosa related to multiple psychiatric medications.  The patient was encouraged to maintain hydration.  She may benefit from the use of sour candies to stimulate parotid gland activity.     2.  Chronic psychiatric illness with history of borderline personality disorder; depression, severe, with psychotic  features.  The patient had been admitted to the inpatient Behavioral Health Unit under commitment with [facility] after provisional discharge was revoked.  She was seen by Psychiatry who increased lamotrigine to 100 mg daily, as had been planned while the patient was on the mental health unit.  Her other medications were unchanged.  The patient's mental status actually improved somewhat during her hospitalization as her physical status improved.  On the day of discharge she was alert, fully oriented, pleasant, and cooperative.      PHYSICAL EXAMINATION:   VITAL SIGNS:  Examination on the day of discharge revealed her to be afebrile with stable vital signs.   LUNGS:  Clear.   CARDIAC:  Heart exam revealed a regular rate and rhythm without murmurs.   ABDOMEN:  Soft, nontender.  There was no edema.   INTEGUMENTARY:  There was mild swelling of her right cheek with faint erythema over the right cheek.  There were areas of skin desquamation over her right cheek and right forehead area.     HEENT:  There is no obvious parotid gland enlargement at the time of discharge.      DISCHARGE MEDICATIONS:   1.  Tylenol on a p.r.n. basis.   2.  Wellbutrin- mg daily.   3.  Clonazepam 0.5 mg twice daily p.r.n. anxiety.   4.  Hydroxyzine 25 mg q.4h. p.r.n. anxiety.   5.  Lamotrigine increased to 100 mg every morning.   6.  Zyprexa 10 mg t.i.d. p.r.n.    7.  Zyprexa 10 mg IM 3 times daily p.r.n.    8.  Protriptyline 10 mg every bedtime.   9.  Seroquel 25 mg t.i.d. p.r.n. psychosis.   10.  Seroquel  mg twice daily.   11.  Rozerem 8 mg at bedtime.   12.  Requip 1 mg at bedtime.   13.  Effexor  mg daily.   14.  Augmentin 875/125, 1 tablet every 12 hours for 6 days.   15.  Senna 2 tablets twice daily p.r.n. constipation.      DISPOSITION:  The patient will discharge back to the inpatient mental health unit.  Internal Medicine consultation will be available during her stay in the inpatient mental health unit.         BROWN PAGAN  MD PRATIK             D: 2021   T: 2021   MT: AMADA      Name:     CHASITY PERKINS   MRN:      [id number]        Account:        [id number]   :      1985           Admit Date:     2021                                  Discharge Date: 2021      Document: [id number]       cc: Monse MELENDREZ

## 2021-04-13 NOTE — PLAN OF CARE
"email from her CM, Samreen L:  \"recommitment/[facility] exam and hearing are scheduled for Tuesday, May 18th at 1PM & 1:45PM, respectively. She should definitely contact her , Darek Robledo, to discuss her options, whether or not she wants to contest the recommitment petition.\"     "

## 2021-04-13 NOTE — PLAN OF CARE
"RN/    Patient is not hopeful stating \"I feel hopeless\" and stated GOAL is \"nothing.\" Patient declined lock out of room stating \"no reason.\" Patient c/o dry mouth (PRN biotene ordered from pharmacy) and declined other PRN medication. Oral abx continue -. Patient did cooperative with out of room after lunch.    Patient rates depression 6* c/o Feelings of worthlessness or excessive/inappropriate guilt  Patient rates anxiety at 6* c/o Thoughts of impending doom  Patient describes mood as \"depressed and anxious\" and affect is flat    Patient is  somewhat cooperative   appearing Blunted/Flat. Patient is med-compliant, is eating 100% and reports \"sleeps through the night\". Patient is not attending groups.    Patient denies current or recent suicidal ideation    SIB denies    HI: denies current or recent homicidal ideation or behaviors.  Patient acknowledges auditory hallucinations.  Patient denies  visual hallucinations.    VS reviewed: BP 91/69   Pulse 98   Temp 97.6  F (36.4  C)   Resp 16   SpO2 95%  . Patient denies  pain.    Patient evaluation continues. Assessed mood,anxiety,thoughts and behavior. Patient is progressing towards goals. Patient is encouraged to participate in groups and assisted to develop healthy coping skills.     Length of stay: 1    Refer to daily team meeting notes for individualized plan of care. Nursing will continue to assess.    * Scale is offered as scale of 1 to 10 with 10 being the worst                                                                          "

## 2021-04-13 NOTE — PLAN OF CARE
Pt slept 7.5 hours. Pt appeared to sleep through the night. No safety issues or concerns reported. Will continue to monitor.

## 2021-04-13 NOTE — PLAN OF CARE
MAIKEL from Lorrie Hernandez with [facility], [phone]. Would like to set up a zoom interview Thursday or Friday    [facility] called back. Zoom set for Thursday at 10 am; she will email a link.

## 2021-04-13 NOTE — PROGRESS NOTES
[facility] [place],  Psychiatric Progress Note      Impression:     Misty Parham is a 35-year-old female admitted to [facility] on 4/12/2021.  She was admitted under MI commitment with Bhargav from [address] [place] [address] where she had been hospitalized since 4/7/2021 due to parotitis.  She had been hospitalized on the psychiatric unit since 2/7/2021 after taking 4-5 tabs of Unisom, 4-5 tabs of Ibuprofen and 4-5 tabs of Tylenol twice daily for 3 months in an attempt to commit suicide.  She was experiencing auditory hallucinations of Satan's voice commanding her to commit suicide.  She had not been taking medications for several weeks prior to admission.       During her hospitalization, her provisional discharge was revoked.  She began refusing medications 2/23 and took them inconsistently until 3/20.  She took them consistently thereafter.     She was spending nearly all of her time in her room and showed little motivation for treatment, so a room lock out was initiated as it has been during previous hospitalizations, from 9:30 AM - 12 PM, 1 PM - 3 PM and 5 PM to 9 PM.  She spent some time in the milieu reading and watching TV but did not attend any groups as she stated she had been to them during previous hospitalizations and did not find them beneficial.  She intermittently superficially scratched her forearms as a form of self-injury without suicidal intent.  A finger foods diet was ordered.  Staff regularly conducted room searches to remove potentially harmful objects from her room, as she often hid foil juice tops and plasticware.  On 3/25 she reported she had attempted to strangle herself with linens on 3/23 and 3/24, and linens were removed from her room.  As of 4/6 she contracted for safety with her linens.  She remained in a room directly across from the desk throughout her hospitalization.  Individual therapy was initiated shortly before her discharge to medical  and she stated that it was helpful.     She was readmitted to the psychiatric unit following treatment with IV antibiotics and medical stabilization.   Since admission, all medications were continued including scheduled Seroquel XR, Protriptyline, Wellbutrin XL, Effexor ER, and PRNs of Klonopin, Hydroxyzine, Seroquel and Zyprexa.  Her Lamictal titration was continued.  She reports some improvement in symptoms with reduced suicidal ideation and reduced urges to engage in self-injury.  She has more range in affect.  She reports auditory hallucinations of Satan's voice commanding her to commit suicide and making derogatory comments remain fairly constant, at the volume of a whisper.         Diagnoses:     Borderline personality disorder  Major depressive disorder, recurrent, severe with psychotic features  Generalized anxiety disorder  Right facial cellulitis with underlying parotitis, treatment with antibiotics         Plan:     Medications.  Continue Lamictal titration (due for increase 4/17).  Continue Seroquel  mg BID with a back up of IM Zyprexa per Bhargav.  Continue Protriptyline 10 mg at HS.  Continue Wellbutrin  mg daily.  Continue Effexor  mg daily.  Continue Rozerem 8 mg at HS.  Continue PRNs of Klonopin, Hydroxyzine, Seroquel and Zyprexa.  Continue to offer PRN Biotene.    Individual therapy on the unit.      Patient is currently under civil commitment MI with Bhargav in [facility] until 5/26/2021.  The limit on her commitment extension has been reached, so a new petition for commitment MI with Bhargav has been filed and her first court appearance will be 5/18.  Current [facility] medications are Zyprexa, Seroquel, Latuda and Abilify.  She also has a current [employer] order, but this it not being pursued during her new commitment.      Her mother indicated that the patient is not welcome to reside with her.  Historically she did not put forth efforts to engage in treatment during IRTS  "placement.  CADI funding and adult foster care placement was pursued.  CADI interview was completed 3/16.  She had an interview with [facility] 4/1/21 and has [facility] funding in place.  Consider placement at [facility] in the interim.          Attestation:  Patient has been seen and evaluated by me, WILLIAN Sánchez CNP  The patient was counseled on nature of illness and treatment plan/options  Care was coordinated with treatment team          Interim History:     The patient's care was discussed with the treatment team and chart notes were reviewed.  Pt was documented as sleeping 7.5 hours during the overnight shift.  She spent time in her room as well as in the milieu reading and watching TV yesterday.  Staff note more range in affect, with occasional smiling.  She said her mood is \"alright.\"  States that suicidal thoughts are \"there\" but \"not that bad\" with no intent/plan.  She has \"a little\" urges to scratch herself as a form of self-injury.  Sleep last night was \"not bad.\"  She reports fairly constant auditory halluciantions of Satan's voice, at the volume of a whisper, commanding her to commit suicide and making derogatory comments.  Discussed with her that the treatment team has noticed some improvements.  She said, \"I feel like I'm on a thin line.  If the day's good it's good, and if it's bad I feel suicidal.\"  She last talked to her children 2 days ago.  States they will be going to [place] with their father in 2 weeks.  States individual therapy has been helpful.           Medications:     Current Facility-Administered Medications   Medication     acetaminophen (TYLENOL) tablet 650 mg     amoxicillin-clavulanate (AUGMENTIN) 875-125 MG per tablet 1 tablet     artificial saliva (BIOTENE MT) solution 2 spray     buPROPion (WELLBUTRIN XL) 24 hr tablet 300 mg     clonazePAM (klonoPIN) tablet 0.5 mg     hydrOXYzine (ATARAX) tablet 25 mg     lamoTRIgine (LaMICtal) tablet 100 mg     " QUEtiapine ER (SEROquel XR) 24 hr tablet 300 mg    Or     OLANZapine (zyPREXA) injection 10 mg     OLANZapine (zyPREXA) tablet 10 mg    Or     OLANZapine (zyPREXA) injection 10 mg     polyethylene glycol (MIRALAX) Packet 17 g     protriptyline (VIVACTIL) tablet 10 mg     QUEtiapine (SEROquel) tablet 25 mg     ramelteon (ROZEREM) tablet 8 mg     rOPINIRole (REQUIP) tablet 1 mg     sennosides (SENOKOT) tablet 2 tablet     venlafaxine (EFFEXOR-XR) 24 hr capsule 225 mg             Allergies:   No Known Allergies         Psychiatric Examination:     BP 91/69   Pulse 98   Temp 98.3  F (36.8  C) (Oral)   Resp 17   SpO2 95%     Appearance:  alert, adequate grooming/hygiene   Attitude:  cooperative   Eye Contact:  good  Mood: depressed but slightly more hopeful, intermittent anxiety  Affect:  intensity is blunted but some smiling  Speech:  quiet, soft  Language: fluent and intact in English  Psychomotor, Gait, Musculoskeletal:  no evidence of tardive dyskinesia, dystonia, or tics  Thought Process:  goal oriented, linear, less than logical  Associations:  no loose associations  Thought Content:  denies homicidal ideation, denies visual hallucinations, reports auditory hallucinations of Channing's voice commanding her to commit suicide and making derogatory comments, endorses suicidal ideation without intent/plan and contracts for safety on the unit, endorses urges to cut self as a form of self-injury but not with suicidal intent  Insight:  limited  Judgement:  limited  Oriented to:  month/year, time, person, and place   Attention Span and Concentration:  fair, improving  Recent and Remote Memory:  intact  Fund of Knowledge:  appropriate          Labs:     No results found for this or any previous visit (from the past 24 hour(s)).

## 2021-04-13 NOTE — PLAN OF CARE
Notice and order for exam and hearing for pt's recommit received. Exam is 5/18 at 1 pm; hearing to follow.    Pt's  is Darek Robledo at [phone]    Copy to pt, copy to chart.

## 2021-04-13 NOTE — PLAN OF CARE
[facility] information to Janette Hernandez at [facility], fax: [phone].     Also got advised by Samreen Machuca that pt's recommit hearing and exam is 5/18 at 1 and 1:45

## 2021-04-14 PROCEDURE — 99233 SBSQ HOSP IP/OBS HIGH 50: CPT | Performed by: NURSE PRACTITIONER

## 2021-04-14 PROCEDURE — 250N000013 HC RX MED GY IP 250 OP 250 PS 637: Performed by: NURSE PRACTITIONER

## 2021-04-14 PROCEDURE — 124N000002 HC R&B MH UMMC

## 2021-04-14 RX ORDER — SALIVA STIMULANT COMB. NO.3
2 SPRAY, NON-AEROSOL (ML) MUCOUS MEMBRANE 2 TIMES DAILY
Status: DISCONTINUED | OUTPATIENT
Start: 2021-04-14 | End: 2021-04-20 | Stop reason: HOSPADM

## 2021-04-14 RX ADMIN — QUETIAPINE FUMARATE 300 MG: 300 TABLET, EXTENDED RELEASE ORAL at 21:06

## 2021-04-14 RX ADMIN — RAMELTEON 8 MG: 8 TABLET ORAL at 21:06

## 2021-04-14 RX ADMIN — QUETIAPINE FUMARATE 300 MG: 300 TABLET, EXTENDED RELEASE ORAL at 08:30

## 2021-04-14 RX ADMIN — VENLAFAXINE HYDROCHLORIDE 225 MG: 150 CAPSULE, EXTENDED RELEASE ORAL at 08:29

## 2021-04-14 RX ADMIN — PROTRIPTYLINE HYDROCHLORIDE 10 MG: 10 TABLET ORAL at 21:06

## 2021-04-14 RX ADMIN — Medication 2 SPRAY: at 19:50

## 2021-04-14 RX ADMIN — ROPINIROLE HYDROCHLORIDE 1 MG: 1 TABLET, FILM COATED ORAL at 21:06

## 2021-04-14 RX ADMIN — BUPROPION HYDROCHLORIDE 300 MG: 150 TABLET, EXTENDED RELEASE ORAL at 08:29

## 2021-04-14 RX ADMIN — POLYETHYLENE GLYCOL 3350 17 G: 17 POWDER, FOR SOLUTION ORAL at 08:30

## 2021-04-14 RX ADMIN — AMOXICILLIN AND CLAVULANATE POTASSIUM 1 TABLET: 875; 125 TABLET, FILM COATED ORAL at 08:29

## 2021-04-14 RX ADMIN — AMOXICILLIN AND CLAVULANATE POTASSIUM 1 TABLET: 875; 125 TABLET, FILM COATED ORAL at 19:50

## 2021-04-14 RX ADMIN — LAMOTRIGINE 100 MG: 25 TABLET ORAL at 08:29

## 2021-04-14 NOTE — PLAN OF CARE
Pt , Luiz, called. Pt needs to notarize a form for her son's passport.     He will bring the papers at Noon tomorrow.

## 2021-04-14 NOTE — PROGRESS NOTES
[facility],  Psychiatric Progress Note      Impression:     Misty Parham is a 35-year-old female admitted to [facility] on 4/12/2021.  She was admitted under MI commitment with Bhargav from [address] [place] [address] where she had been hospitalized since 4/7/2021 due to parotitis.  She had been hospitalized on the psychiatric unit since 2/7/2021 after taking 4-5 tabs of Unisom, 4-5 tabs of Ibuprofen and 4-5 tabs of Tylenol twice daily for 3 months in an attempt to commit suicide.  She was experiencing auditory hallucinations of Satan's voice commanding her to commit suicide.  She had not been taking medications for several weeks prior to admission.       During her hospitalization, her provisional discharge was revoked.  She began refusing medications 2/23 and took them inconsistently until 3/20.  She took them consistently thereafter.     She was spending nearly all of her time in her room and showed little motivation for treatment, so a room lock out was initiated as it has been during previous hospitalizations, from 9:30 AM - 12 PM, 1 PM - 3 PM and 5 PM to 9 PM.  She spent some time in the milieu reading and watching TV but did not attend any groups as she stated she had been to them during previous hospitalizations and did not find them beneficial.  She intermittently superficially scratched her forearms as a form of self-injury without suicidal intent.  A finger foods diet was ordered.  Staff regularly conducted room searches to remove potentially harmful objects from her room, as she often hid foil juice tops and plasticware.  On 3/25 she reported she had attempted to strangle herself with linens on 3/23 and 3/24, and linens were removed from her room.  As of 4/6 she contracted for safety with her linens.  She remained in a room directly across from the desk throughout her hospitalization.  Individual therapy was initiated shortly before her discharge to medical  and she stated that it was helpful.     She was readmitted to the psychiatric unit following treatment with IV antibiotics and medical stabilization.   Since admission, all medications were continued including scheduled Seroquel XR, Protriptyline, Wellbutrin XL, Effexor ER, and PRNs of Klonopin, Hydroxyzine, Seroquel and Zyprexa.  Her Lamictal titration was continued.  She reports some improvement in symptoms with reduced suicidal ideation and reduced urges to engage in self-injury.  She has more range in affect.  She reports auditory hallucinations of Satan's voice commanding her to commit suicide and making derogatory comments remain fairly constant, at the volume of a whisper.         Diagnoses:     Borderline personality disorder  Major depressive disorder, recurrent, severe with psychotic features  Generalized anxiety disorder  Right facial cellulitis with underlying parotitis, treatment with antibiotics         Plan:     Medications.  Continue Lamictal titration (due for increase 4/17).  Continue Seroquel  mg BID with a back up of IM Zyprexa per Bhargav.  Continue Protriptyline 10 mg at HS.  Continue Wellbutrin  mg daily.  Continue Effexor  mg daily.  Continue Rozerem 8 mg at HS.  Continue PRNs of Klonopin, Hydroxyzine, Seroquel and Zyprexa.  Change Biotene from PRN to scheduled.    Individual therapy on the unit.      Patient is currently under civil commitment MI with Bhargav in [facility] until 5/26/2021.  The limit on her commitment extension has been reached, so a new petition for commitment MI with Bhargav has been filed and her first court appearance will be 5/18 at 1 PM.  Current [facility] medications are Zyprexa, Seroquel, Latuda and Abilify.  She also has a current [employer] order, but this it not being pursued during her new commitment.      Her mother indicated that the patient is not welcome to reside with her.  Historically she did not put forth efforts to engage in  "treatment during IRTS placement.  [facility]I funding and adult foster care placement was pursued.  [facility]I interview was completed 3/16.  She had an interview with [facility] 4/1/21 and has [facility] funding in place.  Consider placement at [facility] in the interim; provider completed intake paperwork for [facility] on 4/14 and she has an interview 4/15 at 10 AM.        Attestation:  Patient has been seen and evaluated by me, WILLIAN Sánchez CNP  The patient was counseled on nature of illness and treatment plan/options  Care was coordinated with treatment team          Interim History:     The patient's care was discussed with the treatment team and chart notes were reviewed.  Pt was documented as sleeping 7.5 hours during the overnight shift.  Pt has been spending much of her time in her room.  She reported that her mood is worse with increased suicidal ideation today.  Stated she needs to text a photo of her ID to her ex- and needs a notary for paperwork for her son's passport.  She said their flight to [place] is scheduled for 5/3.  Pt informed that staff could assist her with both these tasks.  Stated she felt better after learning this.  She reports that urges to engage in self-injury have \"calmed down.\"  Reports auditory hallucinations of Satan's voice at the volume of a whisper with the same content.  Pt observed smiling on a couple occasions.  Stated Biotene was helpful and would like it scheduled.  She is hoping to be accepted at [facility] and has an interview tomorrow.  Provider completed online intake paperwork today.          Medications:     Current Facility-Administered Medications   Medication     acetaminophen (TYLENOL) tablet 650 mg     amoxicillin-clavulanate (AUGMENTIN) 875-125 MG per tablet 1 tablet     artificial saliva (BIOTENE MT) solution 2 spray     buPROPion (WELLBUTRIN XL) 24 hr tablet 300 mg     clonazePAM (klonoPIN) tablet 0.5 mg     hydrOXYzine (ATARAX) " tablet 25 mg     lamoTRIgine (LaMICtal) tablet 100 mg     QUEtiapine ER (SEROquel XR) 24 hr tablet 300 mg    Or     OLANZapine (zyPREXA) injection 10 mg     OLANZapine (zyPREXA) tablet 10 mg    Or     OLANZapine (zyPREXA) injection 10 mg     polyethylene glycol (MIRALAX) Packet 17 g     protriptyline (VIVACTIL) tablet 10 mg     QUEtiapine (SEROquel) tablet 25 mg     ramelteon (ROZEREM) tablet 8 mg     rOPINIRole (REQUIP) tablet 1 mg     sennosides (SENOKOT) tablet 2 tablet     venlafaxine (EFFEXOR-XR) 24 hr capsule 225 mg             Allergies:   No Known Allergies         Psychiatric Examination:     BP 97/68   Pulse 89   Temp 97.5  F (36.4  C)   Resp 15   SpO2 99%     Appearance:  alert, adequate grooming/hygiene   Attitude:  cooperative   Eye Contact:  good  Mood: depressed  Affect:  intensity is blunted but some smiling  Speech:  quiet, soft  Language: fluent and intact in English  Psychomotor, Gait, Musculoskeletal:  no evidence of tardive dyskinesia, dystonia, or tics  Thought Process:  goal oriented, linear, less than logical  Associations:  no loose associations  Thought Content:  denies homicidal ideation, denies visual hallucinations, reports auditory hallucinations of Channing's voice commanding her to commit suicide and making derogatory comments, endorses suicidal ideation without intent/plan and contracts for safety on the unit, endorses reduced urges to cut self as a form of self-injury but not with suicidal intent  Insight:  limited  Judgement:  limited  Oriented to:  month/year, time, person, and place   Attention Span and Concentration:  fair, improving  Recent and Remote Memory:  intact  Fund of Knowledge:  appropriate          Labs:     No results found for this or any previous visit (from the past 24 hour(s)).

## 2021-04-15 PROCEDURE — 250N000013 HC RX MED GY IP 250 OP 250 PS 637: Performed by: NURSE PRACTITIONER

## 2021-04-15 PROCEDURE — 124N000002 HC R&B MH UMMC

## 2021-04-15 PROCEDURE — 99232 SBSQ HOSP IP/OBS MODERATE 35: CPT | Performed by: NURSE PRACTITIONER

## 2021-04-15 RX ADMIN — Medication 2 SPRAY: at 21:20

## 2021-04-15 RX ADMIN — BUPROPION HYDROCHLORIDE 300 MG: 150 TABLET, EXTENDED RELEASE ORAL at 09:00

## 2021-04-15 RX ADMIN — RAMELTEON 8 MG: 8 TABLET ORAL at 21:19

## 2021-04-15 RX ADMIN — Medication 2 SPRAY: at 09:01

## 2021-04-15 RX ADMIN — QUETIAPINE FUMARATE 300 MG: 300 TABLET, EXTENDED RELEASE ORAL at 21:18

## 2021-04-15 RX ADMIN — AMOXICILLIN AND CLAVULANATE POTASSIUM 1 TABLET: 875; 125 TABLET, FILM COATED ORAL at 09:01

## 2021-04-15 RX ADMIN — LAMOTRIGINE 100 MG: 25 TABLET ORAL at 09:01

## 2021-04-15 RX ADMIN — AMOXICILLIN AND CLAVULANATE POTASSIUM 1 TABLET: 875; 125 TABLET, FILM COATED ORAL at 21:19

## 2021-04-15 RX ADMIN — QUETIAPINE FUMARATE 300 MG: 300 TABLET, EXTENDED RELEASE ORAL at 09:01

## 2021-04-15 RX ADMIN — ROPINIROLE HYDROCHLORIDE 1 MG: 1 TABLET, FILM COATED ORAL at 21:19

## 2021-04-15 RX ADMIN — PROTRIPTYLINE HYDROCHLORIDE 10 MG: 10 TABLET ORAL at 21:19

## 2021-04-15 RX ADMIN — VENLAFAXINE HYDROCHLORIDE 225 MG: 150 CAPSULE, EXTENDED RELEASE ORAL at 09:01

## 2021-04-15 RX ADMIN — POLYETHYLENE GLYCOL 3350 17 G: 17 POWDER, FOR SOLUTION ORAL at 09:01

## 2021-04-15 ASSESSMENT — ACTIVITIES OF DAILY LIVING (ADL)
ORAL_HYGIENE: INDEPENDENT
DRESS: INDEPENDENT
LAUNDRY: WITH SUPERVISION
HYGIENE/GROOMING: INDEPENDENT

## 2021-04-15 NOTE — PROGRESS NOTES
[facility],  Psychiatric Progress Note      Impression:     Misty Parham is a 35-year-old female admitted to [facility] on 4/12/2021.  She was admitted under MI commitment with Bhargav from [address] [place] [address] where she had been hospitalized since 4/7/2021 due to parotitis.  She had been hospitalized on the psychiatric unit since 2/7/2021 after taking 4-5 tabs of Unisom, 4-5 tabs of Ibuprofen and 4-5 tabs of Tylenol twice daily for 3 months in an attempt to commit suicide.  She was experiencing auditory hallucinations of Satan's voice commanding her to commit suicide.  She had not been taking medications for several weeks prior to admission.       During her hospitalization, her provisional discharge was revoked.  She began refusing medications 2/23 and took them inconsistently until 3/20.  She took them consistently thereafter.     She was spending nearly all of her time in her room and showed little motivation for treatment, so a room lock out was initiated as it has been during previous hospitalizations, from 9:30 AM - 12 PM, 1 PM - 3 PM and 5 PM to 9 PM.  She spent some time in the milieu reading and watching TV but did not attend any groups as she stated she had been to them during previous hospitalizations and did not find them beneficial.  She intermittently superficially scratched her forearms as a form of self-injury without suicidal intent.  A finger foods diet was ordered.  Staff regularly conducted room searches to remove potentially harmful objects from her room, as she often hid foil juice tops and plasticware.  On 3/25 she reported she had attempted to strangle herself with linens on 3/23 and 3/24, and linens were removed from her room.  As of 4/6 she contracted for safety with her linens.  She remained in a room directly across from the desk throughout her hospitalization.  Individual therapy was initiated shortly before her discharge to medical  and she stated that it was helpful.     She was readmitted to the psychiatric unit following treatment with IV antibiotics and medical stabilization.   Since admission, all medications were continued including scheduled Seroquel XR, Protriptyline, Wellbutrin XL, Effexor ER, and PRNs of Klonopin, Hydroxyzine, Seroquel and Zyprexa.  Her Lamictal titration was continued.  She reports some improvement in symptoms with reduced suicidal ideation and reduced urges to engage in self-injury.  She has more range in affect.  She reports auditory hallucinations of Satan's voice commanding her to commit suicide and making derogatory comments remain fairly constant, at the volume of a whisper.         Diagnoses:     Borderline personality disorder  Major depressive disorder, recurrent, severe with psychotic features  Generalized anxiety disorder  Right facial cellulitis with underlying parotitis, treatment with antibiotics         Plan:     Medications.  Continue Lamictal titration (due for increase 4/17).  Continue Seroquel  mg BID with a back up of IM Zyprexa per Bhargav.  Continue Protriptyline 10 mg at HS.  Continue Wellbutrin  mg daily.  Continue Effexor  mg daily.  Continue Rozerem 8 mg at HS.  Continue PRNs of Klonopin, Hydroxyzine, Seroquel and Zyprexa.      Individual therapy on the unit.       Patient is currently under civil commitment MI with Bhargav in [facility] until 5/26/2021.  The limit on her commitment extension has been reached, so a new petition for commitment MI with Bhargav has been filed and her first court appearance will be 5/18 at 1 PM.  Current [facility] medications are Zyprexa, Seroquel, Latuda and Abilify.  She also has a current [facility] order, but this it not being pursued during her new commitment.      Her mother indicated that the patient is not welcome to reside with her.  Historically she did not put forth efforts to engage in treatment during IRTS placement.  ALLY  "funding and adult foster care placement was pursued.  CADI interview was completed 3/16.  She had an interview with [facility] 4/1/21 and has CADI funding in place.  Consider placement at [facility] in the interim; provider completed intake paperwork for [facility] on 4/14 and she has an interview today.        Attestation:  Patient has been seen and evaluated by me, Nissa Morris, WILLIAN CNP  The patient was counseled on nature of illness and treatment plan/options  Care was coordinated with treatment team       Clinical Global Impressions  First:  Considering your total clinical experience with this particular patient population, how severe are the patient's symptoms at this time?: 6 (04/15/21 0626)  Compared to the patient's condition at the START of treatment, this patient's condition is: 3 (04/15/21 0626)  Most recent:  Considering your total clinical experience with this particular patient population, how severe are the patient's symptoms at this time?: 6 (04/15/21 0626)  Compared to the patient's condition at the START of treatment, this patient's condition is: 3 (04/15/21 0626)           Interim History:     The patient's care was discussed with the treatment team and chart notes were reviewed.  Pt was documented as sleeping during most of the overnight shift.  She has been spending some time in the milieu watching TV and reading.  She said that she was relieved to have found solutions to issues regarding her son's passport.  Her anxiety is \"up and down.\"  Her mood is \"alright.\"  She reports suicidal ideation is \"kind of low.\"  States that she can contract for safety in a structured environment outside the hospital as long as she does not have her medications in her possession.  However, she also stated she is \"addicted to death\" and followed up by saying that she would \"probably stop taking meds\" in a few months because she wants to die.  She acknowledged that stopping medications " "would not cause her to die.  She reports that urges to engage in self-injury by scratching herself are very low.  She continues to report auditory hallucinations of Channing's voice at a low volume, with content unchanged.           Medications:     Current Facility-Administered Medications   Medication     acetaminophen (TYLENOL) tablet 650 mg     amoxicillin-clavulanate (AUGMENTIN) 875-125 MG per tablet 1 tablet     artificial saliva (BIOTENE MT) solution 2 spray     buPROPion (WELLBUTRIN XL) 24 hr tablet 300 mg     clonazePAM (klonoPIN) tablet 0.5 mg     hydrOXYzine (ATARAX) tablet 25 mg     lamoTRIgine (LaMICtal) tablet 100 mg     QUEtiapine ER (SEROquel XR) 24 hr tablet 300 mg    Or     OLANZapine (zyPREXA) injection 10 mg     OLANZapine (zyPREXA) tablet 10 mg    Or     OLANZapine (zyPREXA) injection 10 mg     polyethylene glycol (MIRALAX) Packet 17 g     protriptyline (VIVACTIL) tablet 10 mg     QUEtiapine (SEROquel) tablet 25 mg     ramelteon (ROZEREM) tablet 8 mg     rOPINIRole (REQUIP) tablet 1 mg     sennosides (SENOKOT) tablet 2 tablet     venlafaxine (EFFEXOR-XR) 24 hr capsule 225 mg             Allergies:   No Known Allergies         Psychiatric Examination:     /63 (BP Location: Left arm)   Pulse 99   Temp 98.4  F (36.9  C) (Tympanic)   Resp 16   SpO2 93%     Appearance:  alert, adequate grooming/hygiene   Attitude:  cooperative   Eye Contact:  good  Mood: \"alright,\" anxiety is up and down  Affect:  intensity is blunted but some smiling  Speech:  quiet, soft  Language: fluent and intact in English  Psychomotor, Gait, Musculoskeletal:  no evidence of tardive dyskinesia, dystonia, or tics  Thought Process:  goal oriented, linear, less than logical  Associations:  no loose associations  Thought Content:  denies homicidal ideation, denies visual hallucinations, reports auditory hallucinations of Channing's voice commanding her to commit suicide and making derogatory comments, endorses suicidal ideation " and contracts for safety, endorses reduced urges to cut self as a form of self-injury but not with suicidal intent  Insight:  limited  Judgement:  limited  Oriented to:  month/year, time, person, and place   Attention Span and Concentration:  fair, improving  Recent and Remote Memory:  intact  Fund of Knowledge:  appropriate          Labs:     No results found for this or any previous visit (from the past 24 hour(s)).

## 2021-04-15 NOTE — PLAN OF CARE
Pt. willing to engage with staff for a 1:1.  Pt. states that she is safe on the unit currently, not engaging in any SIB.   Pt. states that her thoughts are clear;  pt.'s chronic auditory hallucination is diminished overall.  Pt. states that she is upset over having a roommate if she lives at [facility].  The pt. also states that she will only have 100.00 to spend per month.  Pt. states that if she had her own room, she would be ok with using her monthly income for placement.  Pt. seems to hope that another placement will become available where she can have her own room; pt. states that this is important to her.

## 2021-04-15 NOTE — PLAN OF CARE
"Pt remains isolative to room.  Pt came out on lobby and ate dinner and tried to return to room but door was locked per protocol.  Pt came back out to lobby and read but remained withdrawn.  Pt denies any SI/SIB stating, \"not today.\"  Pt rates depression 6/10 and anxiety a 6/10.  Pt states she is not feeling too good due to her infection.  Pt remained out on unit eating a lot of snacks.  Pt was med compliant.  Pt refused to attend group.  Pt did not engage much in conversation with writer just gave minimal answers.  Pt admits to hearing voices but states they are whispering so not really bothering her at this time and did not tell writer what they were saying.    "

## 2021-04-15 NOTE — PLAN OF CARE
from [facility] with the senior linkage line: 1  [id number]. Has question about application.     CTC called back. Left message

## 2021-04-15 NOTE — PLAN OF CARE
Pt has been accepted into [facility].  This CTC tried to call and LVM for:  ÓSCAR Patel, JOVAN  Admissions Coordinator     [facility]  [address] [place] [address]  Phone: [phone]  FAX: [phone]  [email or web address]    She called back and paperwork was discussed for a Tuesday admission if possible.  Communicated this to floor

## 2021-04-15 NOTE — PROGRESS NOTES
"Pt has been in the lounge watching tv and reading a book most of the shift. Compliant with room lockout from 5pm to 9pm.  Affect is flat, calm.  Reports passive SI and SIB thoughts.  Feels less anxious.  She states that the voices are \"less severe\" today and manageable.  Pt took scheduled medications and reports good oral intake today.  No SIB behavior this shift.    "

## 2021-04-16 PROCEDURE — 124N000002 HC R&B MH UMMC

## 2021-04-16 PROCEDURE — 99233 SBSQ HOSP IP/OBS HIGH 50: CPT | Performed by: NURSE PRACTITIONER

## 2021-04-16 PROCEDURE — 250N000013 HC RX MED GY IP 250 OP 250 PS 637: Performed by: NURSE PRACTITIONER

## 2021-04-16 PROCEDURE — 250N000009 HC RX 250: Performed by: NURSE PRACTITIONER

## 2021-04-16 RX ORDER — BUPROPION HYDROCHLORIDE 300 MG/1
300 TABLET ORAL DAILY
Qty: 30 TABLET | Refills: 0 | Status: SHIPPED | OUTPATIENT
Start: 2021-04-16 | End: 2021-11-28

## 2021-04-16 RX ORDER — VENLAFAXINE HYDROCHLORIDE 75 MG/1
225 CAPSULE, EXTENDED RELEASE ORAL
Qty: 90 CAPSULE | Refills: 0 | Status: SHIPPED | OUTPATIENT
Start: 2021-04-16 | End: 2023-08-23

## 2021-04-16 RX ORDER — LAMOTRIGINE 200 MG/1
200 TABLET ORAL EVERY MORNING
Status: DISCONTINUED | OUTPATIENT
Start: 2021-04-17 | End: 2021-04-20 | Stop reason: HOSPADM

## 2021-04-16 RX ORDER — PROTRIPTYLINE HYDROCHLORIDE 10 MG/1
10 TABLET, FILM COATED ORAL AT BEDTIME
Qty: 30 TABLET | Refills: 0 | Status: SHIPPED | OUTPATIENT
Start: 2021-04-16 | End: 2021-11-28

## 2021-04-16 RX ORDER — POLYETHYLENE GLYCOL 3350 17 G/17G
17 POWDER, FOR SOLUTION ORAL DAILY
Qty: 510 G | Refills: 0 | Status: ON HOLD | OUTPATIENT
Start: 2021-04-16 | End: 2024-02-16

## 2021-04-16 RX ORDER — CLONAZEPAM 0.5 MG/1
0.5 TABLET ORAL 2 TIMES DAILY PRN
Qty: 60 TABLET | Refills: 0 | Status: SHIPPED | OUTPATIENT
Start: 2021-04-16 | End: 2021-04-20

## 2021-04-16 RX ORDER — LAMOTRIGINE 200 MG/1
200 TABLET ORAL EVERY MORNING
Qty: 30 TABLET | Refills: 0 | Status: ON HOLD | OUTPATIENT
Start: 2021-04-16 | End: 2021-05-28

## 2021-04-16 RX ORDER — QUETIAPINE 300 MG/1
300 TABLET, FILM COATED, EXTENDED RELEASE ORAL 2 TIMES DAILY
Qty: 60 TABLET | Refills: 0 | Status: ON HOLD | OUTPATIENT
Start: 2021-04-16 | End: 2021-05-28

## 2021-04-16 RX ORDER — ROPINIROLE 1 MG/1
1 TABLET, FILM COATED ORAL AT BEDTIME
Qty: 30 TABLET | Refills: 0 | Status: SHIPPED | OUTPATIENT
Start: 2021-04-16 | End: 2021-11-28

## 2021-04-16 RX ORDER — SALIVA STIMULANT COMB. NO.3
2 SPRAY, NON-AEROSOL (ML) MUCOUS MEMBRANE 2 TIMES DAILY
Qty: 44.3 ML | Refills: 0 | Status: SHIPPED | OUTPATIENT
Start: 2021-04-16 | End: 2021-11-28

## 2021-04-16 RX ORDER — RAMELTEON 8 MG/1
8 TABLET ORAL AT BEDTIME
Qty: 30 TABLET | Refills: 0 | Status: SHIPPED | OUTPATIENT
Start: 2021-04-16 | End: 2021-11-28

## 2021-04-16 RX ADMIN — AMOXICILLIN AND CLAVULANATE POTASSIUM 1 TABLET: 875; 125 TABLET, FILM COATED ORAL at 08:06

## 2021-04-16 RX ADMIN — BUPROPION HYDROCHLORIDE 300 MG: 150 TABLET, EXTENDED RELEASE ORAL at 08:06

## 2021-04-16 RX ADMIN — PROTRIPTYLINE HYDROCHLORIDE 10 MG: 10 TABLET ORAL at 20:55

## 2021-04-16 RX ADMIN — ROPINIROLE HYDROCHLORIDE 1 MG: 1 TABLET, FILM COATED ORAL at 20:55

## 2021-04-16 RX ADMIN — LAMOTRIGINE 100 MG: 25 TABLET ORAL at 08:06

## 2021-04-16 RX ADMIN — AMOXICILLIN AND CLAVULANATE POTASSIUM 1 TABLET: 875; 125 TABLET, FILM COATED ORAL at 20:55

## 2021-04-16 RX ADMIN — QUETIAPINE FUMARATE 300 MG: 300 TABLET, EXTENDED RELEASE ORAL at 20:55

## 2021-04-16 RX ADMIN — VENLAFAXINE HYDROCHLORIDE 225 MG: 150 CAPSULE, EXTENDED RELEASE ORAL at 08:06

## 2021-04-16 RX ADMIN — RAMELTEON 8 MG: 8 TABLET ORAL at 20:55

## 2021-04-16 RX ADMIN — Medication 2 SPRAY: at 20:55

## 2021-04-16 RX ADMIN — QUETIAPINE FUMARATE 300 MG: 300 TABLET, EXTENDED RELEASE ORAL at 08:06

## 2021-04-16 RX ADMIN — POLYETHYLENE GLYCOL 3350 17 G: 17 POWDER, FOR SOLUTION ORAL at 08:06

## 2021-04-16 RX ADMIN — Medication 5 UNITS: at 10:03

## 2021-04-16 RX ADMIN — Medication 2 SPRAY: at 08:07

## 2021-04-16 NOTE — PLAN OF CARE
Nursing Assessment     Pt had uneventful shift. Pt spent the beginning of the shift napping in bed in her room. Room lock out protocol was followed when pt came out for dinner. Pt visible in the milieu, however withdrawn from peers. Pt did not attend group. Throughout the shift, pt appeared calm and depressed. Pt endorses SI and AVH. Pt reports SI passive in nature. Pt agreeable to safe behaviors. Pt denies SIB and HI. Pt ate 100% of dinner. No observed concerns with appetite. Pt denies any concerns with sleep No acute medical concerns at this time. Pt took medications without issue. Pt did not require any PRNs this shift. Pt educated on plan of care and encouraged to continue working toward goals.    Pt remains on SI and SIB precautions. Will continue to monitor and support.

## 2021-04-16 NOTE — PLAN OF CARE
..Pt was isolative to room except when locked out of her room during the day. Pt was withdrawn, not social with peers, spent time reading and watching tv in the lounge. Affect was blunted, flat. Mood was depressed. She ate meals and was compliant with medications, not attending groups. Pt endorses AH and thoughts of SI/SIB but contracts for safety on the unit. Mantoux test was administered on pt's L inner arm and will be read on 4/18. Will continue to monitor.

## 2021-04-16 NOTE — PROGRESS NOTES
DISCUS assessment completed. Score=7. Misty was fully cooperative. Paper copy placed in paper chart.

## 2021-04-16 NOTE — PLAN OF CARE
Psychiatry - Dr. Hernández - [facility]  Dr. Hernández is out until July 6 so a follow up appointment will be with Colby Karimi Wednesday 4/28/21 10:30 to 11:30am for now.  This will be by phone.  [facility]  [address] [place]  Ph: [phone]    Fx: [phone]     Therapy - [facility]  Follow up appointment scheduled on 4/29/21 @ 2pm  [facility]  [address]     [place]  Ph:  [phone]    Fx:  [phone]    PCP - Kathleen Spencer - [facility] was not available until May and you need to be seen sooner so an appointment was set for:  Thursday April 22, 2021 @ 8am in person with Dr. Zuleika Edward  Address: [address] [place] [address] [place] [address]  Phone: [phone] fax: [phone]

## 2021-04-17 PROCEDURE — 250N000013 HC RX MED GY IP 250 OP 250 PS 637: Performed by: NURSE PRACTITIONER

## 2021-04-17 PROCEDURE — 124N000002 HC R&B MH UMMC

## 2021-04-17 PROCEDURE — 87635 SARS-COV-2 COVID-19 AMP PRB: CPT | Performed by: NURSE PRACTITIONER

## 2021-04-17 RX ADMIN — ROPINIROLE HYDROCHLORIDE 1 MG: 1 TABLET, FILM COATED ORAL at 21:55

## 2021-04-17 RX ADMIN — AMOXICILLIN AND CLAVULANATE POTASSIUM 1 TABLET: 875; 125 TABLET, FILM COATED ORAL at 08:22

## 2021-04-17 RX ADMIN — VENLAFAXINE HYDROCHLORIDE 225 MG: 150 CAPSULE, EXTENDED RELEASE ORAL at 08:22

## 2021-04-17 RX ADMIN — QUETIAPINE FUMARATE 300 MG: 300 TABLET, EXTENDED RELEASE ORAL at 08:22

## 2021-04-17 RX ADMIN — RAMELTEON 8 MG: 8 TABLET ORAL at 21:55

## 2021-04-17 RX ADMIN — AMOXICILLIN AND CLAVULANATE POTASSIUM 1 TABLET: 875; 125 TABLET, FILM COATED ORAL at 21:50

## 2021-04-17 RX ADMIN — Medication 2 SPRAY: at 08:22

## 2021-04-17 RX ADMIN — PROTRIPTYLINE HYDROCHLORIDE 10 MG: 10 TABLET ORAL at 21:55

## 2021-04-17 RX ADMIN — BUPROPION HYDROCHLORIDE 300 MG: 150 TABLET, EXTENDED RELEASE ORAL at 08:22

## 2021-04-17 RX ADMIN — LAMOTRIGINE 200 MG: 200 TABLET ORAL at 08:22

## 2021-04-17 RX ADMIN — Medication 2 SPRAY: at 21:51

## 2021-04-17 RX ADMIN — POLYETHYLENE GLYCOL 3350 17 G: 17 POWDER, FOR SOLUTION ORAL at 08:22

## 2021-04-17 RX ADMIN — QUETIAPINE FUMARATE 300 MG: 300 TABLET, EXTENDED RELEASE ORAL at 21:51

## 2021-04-17 ASSESSMENT — ACTIVITIES OF DAILY LIVING (ADL)
HYGIENE/GROOMING: INDEPENDENT
LAUNDRY: WITH SUPERVISION
LAUNDRY: WITH SUPERVISION
HYGIENE/GROOMING: INDEPENDENT
ORAL_HYGIENE: INDEPENDENT
ORAL_HYGIENE: INDEPENDENT
DRESS: INDEPENDENT
DRESS: INDEPENDENT

## 2021-04-17 NOTE — PROGRESS NOTES
"Pt declined invitation to group. She didn't give eye contact and continued to intently read her book. Writer asked if she wanted to have 1:1 therapy this weekend, she nodded that she would like that. Writer will visit with her either Saturday or Sunday. She noted that there was \" a little\" improvement since we last spoke a week ago.  "

## 2021-04-17 NOTE — PLAN OF CARE
Pt was isolative to room except when locked out of her room during the day. Pt was withdrawn, not social with peers, spent time reading and watching tv in the lounge. Affect was blunted, flat. Mood was depressed. She ate meals and was compliant with medications, not attending groups. Pt endorses AH and thoughts of SI/SIB. Will continue to monitor.

## 2021-04-18 PROCEDURE — 250N000013 HC RX MED GY IP 250 OP 250 PS 637: Performed by: NURSE PRACTITIONER

## 2021-04-18 PROCEDURE — G0177 OPPS/PHP; TRAIN & EDUC SERV: HCPCS

## 2021-04-18 PROCEDURE — 124N000002 HC R&B MH UMMC

## 2021-04-18 RX ADMIN — LAMOTRIGINE 200 MG: 200 TABLET ORAL at 08:20

## 2021-04-18 RX ADMIN — POLYETHYLENE GLYCOL 3350 17 G: 17 POWDER, FOR SOLUTION ORAL at 08:20

## 2021-04-18 RX ADMIN — BUPROPION HYDROCHLORIDE 300 MG: 150 TABLET, EXTENDED RELEASE ORAL at 08:20

## 2021-04-18 RX ADMIN — ROPINIROLE HYDROCHLORIDE 1 MG: 1 TABLET, FILM COATED ORAL at 21:07

## 2021-04-18 RX ADMIN — PROTRIPTYLINE HYDROCHLORIDE 10 MG: 10 TABLET ORAL at 21:07

## 2021-04-18 RX ADMIN — Medication 2 SPRAY: at 21:07

## 2021-04-18 RX ADMIN — VENLAFAXINE HYDROCHLORIDE 225 MG: 150 CAPSULE, EXTENDED RELEASE ORAL at 08:20

## 2021-04-18 RX ADMIN — QUETIAPINE FUMARATE 300 MG: 300 TABLET, EXTENDED RELEASE ORAL at 21:07

## 2021-04-18 RX ADMIN — Medication 2 SPRAY: at 08:27

## 2021-04-18 RX ADMIN — AMOXICILLIN AND CLAVULANATE POTASSIUM 1 TABLET: 875; 125 TABLET, FILM COATED ORAL at 08:21

## 2021-04-18 RX ADMIN — RAMELTEON 8 MG: 8 TABLET ORAL at 21:07

## 2021-04-18 RX ADMIN — QUETIAPINE FUMARATE 300 MG: 300 TABLET, EXTENDED RELEASE ORAL at 08:21

## 2021-04-18 ASSESSMENT — ACTIVITIES OF DAILY LIVING (ADL)
ORAL_HYGIENE: INDEPENDENT
HYGIENE/GROOMING: INDEPENDENT
ORAL_HYGIENE: INDEPENDENT
DRESS: INDEPENDENT
LAUNDRY: WITH SUPERVISION
HYGIENE/GROOMING: INDEPENDENT
LAUNDRY: WITH SUPERVISION
DRESS: INDEPENDENT

## 2021-04-18 NOTE — PLAN OF CARE
"Patient has been cooperative with lock out of room this shift. Patient ate 100%, was med-compliant, and spent shift reading in lounge. Patient has a flat affect, is withdrawn, and not social with peers. Patient reports auditory hallucinations are quieter and \"less depression and less anxiety.\" Patient talked with  on phone this shift.     Patient denies SI/SIB \"today.\"     /69 (BP Location: Right arm)   Pulse 94   Temp 98.4  F (36.9  C) (Tympanic)   Resp 16   SpO2 97% . Patient denies pain.    Nursing will continue to monitor.        "

## 2021-04-18 NOTE — PROGRESS NOTES
Pt's Mantoux test result was negative. Skin was smooth, no swelling or redness visible on L forearm.

## 2021-04-19 PROCEDURE — 99232 SBSQ HOSP IP/OBS MODERATE 35: CPT | Performed by: NURSE PRACTITIONER

## 2021-04-19 PROCEDURE — 250N000013 HC RX MED GY IP 250 OP 250 PS 637: Performed by: NURSE PRACTITIONER

## 2021-04-19 PROCEDURE — 124N000002 HC R&B MH UMMC

## 2021-04-19 RX ADMIN — PROTRIPTYLINE HYDROCHLORIDE 10 MG: 10 TABLET ORAL at 21:52

## 2021-04-19 RX ADMIN — Medication 2 SPRAY: at 14:42

## 2021-04-19 RX ADMIN — BUPROPION HYDROCHLORIDE 300 MG: 150 TABLET, EXTENDED RELEASE ORAL at 09:24

## 2021-04-19 RX ADMIN — POLYETHYLENE GLYCOL 3350 17 G: 17 POWDER, FOR SOLUTION ORAL at 09:25

## 2021-04-19 RX ADMIN — CLONAZEPAM 0.5 MG: 0.5 TABLET ORAL at 21:52

## 2021-04-19 RX ADMIN — QUETIAPINE FUMARATE 300 MG: 300 TABLET, EXTENDED RELEASE ORAL at 21:52

## 2021-04-19 RX ADMIN — Medication 2 SPRAY: at 21:52

## 2021-04-19 RX ADMIN — LAMOTRIGINE 200 MG: 200 TABLET ORAL at 09:24

## 2021-04-19 RX ADMIN — VENLAFAXINE HYDROCHLORIDE 225 MG: 150 CAPSULE, EXTENDED RELEASE ORAL at 09:24

## 2021-04-19 RX ADMIN — HYDROXYZINE HYDROCHLORIDE 25 MG: 25 TABLET, FILM COATED ORAL at 17:17

## 2021-04-19 RX ADMIN — QUETIAPINE FUMARATE 300 MG: 300 TABLET, EXTENDED RELEASE ORAL at 09:24

## 2021-04-19 RX ADMIN — ROPINIROLE HYDROCHLORIDE 1 MG: 1 TABLET, FILM COATED ORAL at 21:53

## 2021-04-19 RX ADMIN — RAMELTEON 8 MG: 8 TABLET ORAL at 21:53

## 2021-04-19 ASSESSMENT — ACTIVITIES OF DAILY LIVING (ADL)
HYGIENE/GROOMING: INDEPENDENT
LAUNDRY: WITH SUPERVISION
ORAL_HYGIENE: INDEPENDENT
ORAL_HYGIENE: INDEPENDENT
HYGIENE/GROOMING: INDEPENDENT
DRESS: INDEPENDENT
LAUNDRY: WITH SUPERVISION
DRESS: INDEPENDENT

## 2021-04-19 NOTE — PLAN OF CARE
"CTC Note        Work Completed: This CTC reviewed chart, remotely attended team discussion and responded to request for a redo of a referral to the Senior Linkage Line.  The first submission was rejected due to \"OBRA\" questions.  This writer unfamiliar with this process so contacted person requesting it:    Kiana Camarillo, RN, PHN  Certified Initial   [email or web address]   Long Term Services and Supports  [facility] and [place] [address] [place] [address]  Mail Code [phone] () [phone]  (Fax) [phone]    Kiana noted that she received on line application that should have gone to another reviewer.  They instructed her that it needed resubmitted.  She was unable to give this writer the name of the person who suggested this as she was uncertain if this was against protocol.  She offered the number of the senior linkage line and this writer redid form and was not led to OBRA questions so called [facility] at [phone] and spoke to staff who will review the resubmitted document and contact this CTC if revision is necessary.  This is not a document that is typical for admission to an IRTS facility. Document number is [id number]    Earlier email received by admissions:    Janette Hernandez <[email or web address]>   Mon 4/19/2021 9:46 AM   ?   ?   ?   ?   ?   To:   Alicia Coto;   Sarah Evangelista;   Nissa Morris A  Good Morning Everyone:   In case you tried contacting [facility] see below for updates. Can't thank you enough for everything. We look forward to welcoming Misty tomorrow.   Lorrie Hernandez, ÓSCAR, JOVAN  Admissions Coordinator     [address] [address]  Phone: [phone]  FAX: [phone]  [email or web address]  Visit our website: [email or web address]    Later this CTC received call back from Senior Linkage Louise magaña, offering support for filling out the forms.  New attempt resulted in " confirmation [id number].          Discharge plan or goal: this CTC will continue to address correction or resubmission of form after hearing back from Senior Linkage line.       Appointments were already arranged by this writer.                Barriers to discharge: no mental health barriers.

## 2021-04-19 NOTE — PLAN OF CARE
BEHAVIORAL TEAM DISCUSSION    Participants: Hina Morris CNP; Sarah Evangelista and Alicia Coto CNP; Charleen Booth RN  Progress: pt appears stable and is reporting readiness for discharge.    Anticipated length of stay: plan is to discharge tomorrow.  Continued Stay Criteria/Rationale: transition to structured living arrangement in process as pt needs structured living arrangement  Medical/Physical: pt is medically stable  Precautions:   Behavioral Orders   Procedures     Code 1 - Restrict to Unit     DISCUS     Petition for commitment     Recommitment - MI with Bhargav in [facility]     Routine Programming     As clinically indicated     Self Injury Precaution     Single Room     Status 15     Every 15 minutes.     Suicide precautions     Patients on Suicide Precautions should have a Combination Diet ordered that includes a Diet selection(s) AND a Behavioral Tray selection for Safe Tray - with utensils, or Safe Tray - NO utensils       Plan: discharge tomorrow to [facility] and OP care / appointments in place.  Rationale for change in precautions or plan: arrangements in place, plan in place.

## 2021-04-19 NOTE — PROGRESS NOTES
[facility],  Psychiatric Progress Note      Impression:     Misty Parham is a 35-year-old female admitted to [facility] on 4/12/2021.  She was admitted under MI commitment with Bhargav from [address] [place] [address] where she had been hospitalized since 4/7/2021 due to parotitis.  She had been hospitalized on the psychiatric unit since 2/7/2021 after taking 4-5 tabs of Unisom, 4-5 tabs of Ibuprofen and 4-5 tabs of Tylenol twice daily for 3 months in an attempt to commit suicide.  She was experiencing auditory hallucinations of Satan's voice commanding her to commit suicide.  She had not been taking medications for several weeks prior to admission.       During her hospitalization, her provisional discharge was revoked.  She began refusing medications 2/23 and took them inconsistently until 3/20.  She took them consistently thereafter.     She was spending nearly all of her time in her room and showed little motivation for treatment, so a room lock out was initiated as it has been during previous hospitalizations, from 9:30 AM - 12 PM, 1 PM - 3 PM and 5 PM to 9 PM.  She spent some time in the milieu reading and watching TV but did not attend any groups as she stated she had been to them during previous hospitalizations and did not find them beneficial.  She intermittently superficially scratched her forearms as a form of self-injury without suicidal intent.  A finger foods diet was ordered.  Staff regularly conducted room searches to remove potentially harmful objects from her room, as she often hid foil juice tops and plasticware.  On 3/25 she reported she had attempted to strangle herself with linens on 3/23 and 3/24, and linens were removed from her room.  As of 4/6 she contracted for safety with her linens.  She remained in a room directly across from the desk throughout her hospitalization.  Individual therapy was initiated shortly before her discharge to medical  and she stated that it was helpful.     She was readmitted to the psychiatric unit following treatment with IV antibiotics and medical stabilization.   Since admission, all medications were continued including scheduled Seroquel XR, Protriptyline, Wellbutrin XL, Effexor ER, and PRNs of Klonopin, Hydroxyzine, Seroquel and Zyprexa.  Her Lamictal titration was continued.  She reports some improvement in symptoms with reduced suicidal ideation and reduced urges to engage in self-injury.  She has more range in affect.  She reports auditory hallucinations of Satan's voice commanding her to commit suicide and making derogatory comments remain fairly constant, at the volume of a whisper.         Diagnoses:     Borderline personality disorder  Major depressive disorder, recurrent, severe with psychotic features  Generalized anxiety disorder         Plan:     Medications.  Continue Lamcital 200 mg daily.  Continue Seroquel  mg BID with a back up of IM Zyprexa per Bhargav.  Continue Protriptyline 10 mg at HS.  Continue Wellbutrin  mg daily.  Continue Effexor  mg daily.  Continue Rozerem 8 mg at HS.  Continue PRNs of Klonopin, Hydroxyzine, Seroquel and Zyprexa.  Scripts for discharge meds sent to [facility] on 4/16.  Provider called [facility] to determine if there were any issues with having them filled, but they stated they cannot even run them through insurance until they confirm she has been admitted to [facility] and is physically at the facility.      Individual therapy on the unit.       Patient is currently under civil commitment MI with Bhargav in [facility] until 5/26/2021.  The limit on her commitment extension has been reached, so a new petition for commitment MI with Bhargav has been filed and her first court appearance will be 5/18 at 1 PM.  Current [facility] medications are Zyprexa, Seroquel, Latuda and Abilify.  She also has a current [facility] order, but this it not being  "pursued during her new commitment.      Her mother indicated that the patient is not welcome to reside with her.  Historically she did not put forth efforts to engage in treatment during IRTS placement.  CADI funding and adult foster care placement was pursued.  CADI interview was completed 3/16.  She had an interview with [facility] 4/1/21 and has [facility] funding in place.  She has been accepted at [facility] with admission 4/20 at 10:30 AM and will stay there until adult foster care placement is secured.          Attestation:  Patient has been seen and evaluated by me, WILLIAN Sánchez CNP  The patient was counseled on nature of illness and treatment plan/options  Care was coordinated with treatment team       Clinical Global Impressions  First:  Considering your total clinical experience with this particular patient population, how severe are the patient's symptoms at this time?: 6 (04/15/21 0626)  Compared to the patient's condition at the START of treatment, this patient's condition is: 3 (04/15/21 0626)  Most recent:  Considering your total clinical experience with this particular patient population, how severe are the patient's symptoms at this time?: 6 (04/15/21 0626)  Compared to the patient's condition at the START of treatment, this patient's condition is: 3 (04/15/21 0626)           Interim History:     The patient's care was discussed with the treatment team and chart notes were reviewed.  Pt was documented as sleeping 7, 6.75 and 7.5 hours during the weekend overnight shifts.  BP has been intermittently low; she has been asymptomatic and admits to not consuming adequate amounts of fluid.  States her mood is \"kind of low\" and anxious related to concerns about having a roommate at [facility].  She reports auditory hallucinations of Satan's voice at the volume of a whisper.   When asked about suicidal ideation, she responded \"not really.\"  She denies urges to engage in " "self-harm.  She contracts for safety at [facility].           Medications:     Current Facility-Administered Medications   Medication     acetaminophen (TYLENOL) tablet 650 mg     artificial saliva (BIOTENE MT) solution 2 spray     buPROPion (WELLBUTRIN XL) 24 hr tablet 300 mg     clonazePAM (klonoPIN) tablet 0.5 mg     hydrOXYzine (ATARAX) tablet 25 mg     lamoTRIgine (LaMICtal) tablet 200 mg     QUEtiapine ER (SEROquel XR) 24 hr tablet 300 mg    Or     OLANZapine (zyPREXA) injection 10 mg     OLANZapine (zyPREXA) tablet 10 mg    Or     OLANZapine (zyPREXA) injection 10 mg     polyethylene glycol (MIRALAX) Packet 17 g     protriptyline (VIVACTIL) tablet 10 mg     QUEtiapine (SEROquel) tablet 25 mg     ramelteon (ROZEREM) tablet 8 mg     rOPINIRole (REQUIP) tablet 1 mg     sennosides (SENOKOT) tablet 2 tablet     venlafaxine (EFFEXOR-XR) 24 hr capsule 225 mg             Allergies:   No Known Allergies         Psychiatric Examination:     BP 93/66   Pulse 92   Temp 98  F (36.7  C)   Resp 16   SpO2 97%     Appearance:  alert, adequate grooming/hygiene   Attitude:  cooperative   Eye Contact:  good  Mood: \"kind of low,\" anxious  Affect:  intensity is blunted  Speech:  quiet, soft  Language: fluent and intact in English  Psychomotor, Gait, Musculoskeletal:  no evidence of tardive dyskinesia, dystonia, or tics  Thought Process:  goal oriented, linear, less than logical  Associations:  no loose associations  Thought Content:  denies homicidal ideation, denies visual hallucinations, reports auditory hallucinations of Channing's voice commanding her to commit suicide (but states she would not act upon these) and making derogatory comments, denies suicidal ideation, denies urges to engage in self-injury  Insight:  limited  Judgement:  limited  Oriented to:  month/year, time, person, and place   Attention Span and Concentration:  fair, improving  Recent and Remote Memory:  intact  Fund of Knowledge:  appropriate         "  Labs:     No results found for this or any previous visit (from the past 24 hour(s)).

## 2021-04-19 NOTE — PLAN OF CARE
"  Problem: Activity and Energy Impairment (Depressive Signs/Symptoms)  Goal: Optimized Energy Level (Depressive Signs/Symptoms)  Outcome: No Change     Problem: Decreased Participation and Engagement (Depressive Signs/Symptoms)  Goal: Increased Participation and Engagement (Depressive Signs/Symptoms)  Outcome: No Change     Problem: Behavior Regulation Impairment (Psychotic Signs/Symptoms)  Goal: Improved Behavioral Control (Psychotic Signs/Symptoms)  Outcome: Improving     Pt spent shift in her room, other than her lockout times. Her BP at 1910 was low, 83/66. Pt denies any symptoms including dizziness. Pt states that she hasn't been drinking much water, writer encouraged her to drink some. Rechecked BP around 2030: 93/66. Pt states that she ate dinner. Pt denies pain. Pt denies current SI/SIB. She reports voices at \"a whisper,\" and she feels that they have \"maybe\" improved since she was admitted. Pt endorses good appetite and sleep. Pt denies any other medical concerns.     "

## 2021-04-19 NOTE — PLAN OF CARE
Denied pain, anxiety 6/10, depression 6/10. Patient said she is anxious about discharging tomorrow, prn atarax given. Cooperative with cares and pleasant upon approach. Affect flat and patient present in milieu after door getting locked otherwise had been mostly isolative in room with the exception of phone calls. Said she is hearing voices and they are a whisper that tell her to kill herself. Patient denies any actual thoughts of taking her life and contracts for safety. Continues on suicide and SIB precautions.

## 2021-04-20 VITALS
TEMPERATURE: 97.7 F | OXYGEN SATURATION: 98 % | DIASTOLIC BLOOD PRESSURE: 70 MMHG | HEART RATE: 101 BPM | RESPIRATION RATE: 17 BRPM | SYSTOLIC BLOOD PRESSURE: 94 MMHG

## 2021-04-20 PROCEDURE — 250N000013 HC RX MED GY IP 250 OP 250 PS 637: Performed by: NURSE PRACTITIONER

## 2021-04-20 PROCEDURE — 99239 HOSP IP/OBS DSCHRG MGMT >30: CPT | Performed by: NURSE PRACTITIONER

## 2021-04-20 RX ORDER — CLONAZEPAM 0.5 MG/1
0.5 TABLET ORAL 2 TIMES DAILY PRN
Qty: 60 TABLET | Refills: 0 | Status: SHIPPED | OUTPATIENT
Start: 2021-04-20 | End: 2021-11-10

## 2021-04-20 RX ADMIN — VENLAFAXINE HYDROCHLORIDE 225 MG: 150 CAPSULE, EXTENDED RELEASE ORAL at 08:27

## 2021-04-20 RX ADMIN — LAMOTRIGINE 200 MG: 200 TABLET ORAL at 08:28

## 2021-04-20 RX ADMIN — Medication 2 SPRAY: at 09:06

## 2021-04-20 RX ADMIN — POLYETHYLENE GLYCOL 3350 17 G: 17 POWDER, FOR SOLUTION ORAL at 08:27

## 2021-04-20 RX ADMIN — BUPROPION HYDROCHLORIDE 300 MG: 150 TABLET, EXTENDED RELEASE ORAL at 08:27

## 2021-04-20 RX ADMIN — QUETIAPINE FUMARATE 300 MG: 300 TABLET, EXTENDED RELEASE ORAL at 08:28

## 2021-04-20 ASSESSMENT — ACTIVITIES OF DAILY LIVING (ADL)
ORAL_HYGIENE: INDEPENDENT
DRESS: STREET CLOTHES;INDEPENDENT
HYGIENE/GROOMING: HANDWASHING;INDEPENDENT

## 2021-04-20 NOTE — DISCHARGE INSTRUCTIONS
Behavioral Discharge Planning and Instructions    Summary:   You were admitted on 4/12/2021  due to Suicidal Ideations.  You were treated by Hina Morris CNP and discharged on 4/20/2021 from station [facility] to [facility]   [facility]  [address] [place] [id number]  Phone: [phone]  FAX: [phone]    [facility]. This was continued on 2/26/2020. You are being discharged on a Provisional Discharge Agreement which shall remain in effect for the duration of the Commitment which expires on 5/26/2021. You do have a court date for recommitment on 5/18/2021.  You are also court ordered to take the medications that the doctor ordered for you.       Main Diagnosis:    Borderline personality disorder  Major depressive disorder, recurrent, severe with psychotic features  Generalized anxiety disorder      Health Care Follow-up:     PCP - Kathleen Spencer - [facility] was not available until May and you need to be seen sooner so an appointment was set for:  Thursday April 22, 2021 @ 8am in person with Dr. Zuleika CervantesCamp Verde  Address: [address] [place] [address]  Phone: [phone] fax: [phone]     Psychiat[place] - Dr. Hernández [place] [facility]   Dr. Hernández is out until July 6 so a follow up appointment will be with Colby Karimi Wednesday 4/28/21 10:30 to 11:30am for now.  This will be by phone.  [facility]  [address] [place]  Ph: [phone]    Fx: [phone]     Therapy - Anderson Moss [facility]  Follow up appointment scheduled on 4/29/21 @ 2pm  [facility]  [address]     [place]  Ph:  [phone]    Fx:  [phone]     [facility]  - Brinda Machuca ([phone] / fax [phone])    Attend all scheduled appointments with your outpatient providers. Call at least 24 hours in advance if you need  "to reschedule an appointment to ensure continued access to your outpatient providers.     Major Treatments, Procedures and Findings:  You were provided with: a psychiatric assessment, assessed for medical stability, medication evaluation and/or management, milieu management and medical interventions    Symptoms to Report: mood getting worse or thoughts of suicide    Early warning signs can include: increased depression or anxiety increased thoughts or behaviors of suicide or self-harm  increased unusual thinking, such as paranoia or hearing voices    Safety and Wellness:  Take all medicines as directed.  Make no changes unless your doctor suggests them.        Follow treatment recommendations.  Refrain from alcohol and non-prescribed drugs.  If there is a concern for safety, call 911.    Resources:   Crisis Intervention: 988.915.5092 or 201-283-6494 (TTY: 625.916.2047).  Call anytime for help.  National [place] on Mental Illness (www.mn.davian.org): 545.329.3858 or 019-493-1281.  Suicide Awareness Voices of Education (SAVE) (www.save.org): 421-556-SAVE (7483)  [facility] Crisis Response - Adult 057 912-7169  Text 4 Life: txt \"LIFE\" to 79166 for immediate support and crisis intervention  Crisis text line: Text \"[place]\" to 825029. Free, confidential, 24/7.  Crisis Intervention: 544.834.1268 or 206-581-8837. Call anytime for help.     General Medication Instructions:   See your medication sheet(s) for instructions.   Take all medicines as directed.  Make no changes unless your doctor suggests them.   Go to all your doctor visits.  Be sure to have all your required lab tests. This way, your medicines can be refilled on time.  Do not use any drugs not prescribed by your doctor.  Avoid alcohol.    Advance Directives:   Scanned document on file with [employer]? No scanned doc  Is document scanned? No. Copy Requested.  Honoring Choices Your Rights Handout: Informed and given  Was more information offered? Pt declined    The " Treatment team has appreciated the opportunity to work with you. If you have any questions or concerns about your recent admission, you can contact the unit which can receive your call 24 hours a day, 7 days a week. They will be able to get in touch with a Provider if needed. The unit number is [phone] .

## 2021-04-20 NOTE — PROGRESS NOTES
"Pt is discharged via taxi, as per avs. She has all belongings, no discharge meds sent with her. Pt's mood is \"kind of stressed.\" She appears calm, a/o, states is ready to go. She denies si, but states she \"always has voices, saying it's time do die.\" Pt contracts for safety. No somatic concerns, vss. See discharge teja and PCS; also avs.  "

## 2021-04-20 NOTE — PLAN OF CARE
Provisional discharge reviewed with pt. Pt signed, copy to pt, copy to chart.    Change of status and PD sent to court.

## 2021-04-20 NOTE — DISCHARGE SUMMARY
"Psychiatric Discharge Summary    Misty Parham MRN# [id number]   Age: 35 year old YOB: 1985     Date of Admission:  4/12/2021  Date of Discharge:  4/20/2021  Admitting Physician:  Zhang Hargrove MD  Discharge Physician:  WILLIAN Sánchez CNP (Contact: [phone])         Event Leading to Hospitalization:      From H&P 4/12/2021:    \"Channing was mad because he wanted me to die from the infection.\"     Misty Parham is a 35-year-old female admitted to [address] on 4/12/2021.  She was admitted under MI commitment with Bhargav from [address] [place] [address] where she had been hospitalized since 4/7/2021 due to parotitis.  She had been hospitalized on the psychiatric unit since 2/7/2021 after taking 4-5 tabs of Unisom, 4-5 tabs of Ibuprofen and 4-5 tabs of Tylenol twice daily for 3 months in an attempt to commit suicide.  She was experiencing auditory hallucinations of Channing's voice commanding her to commit suicide.  She had not been taking medications for several weeks prior to admission.       During her hospitalization, her provisional discharge was revoked.  She began refusing medications 2/23 and took them inconsistently until 3/20.  She took them consistently thereafter.     She was spending nearly all of her time in her room and showed little motivation for treatment, so a room lock out was initiated as it has been during previous hospitalizations, from 9:30 AM - 12 PM, 1 PM - 3 PM and 5 PM to 9 PM.  She spent some time in the milieu reading and watching TV but did not attend any groups as she stated she had been to them during previous hospitalizations and did not find them beneficial.  She intermittently superficially scratched her forearms as a form of self-injury without suicidal intent.  A finger foods diet was ordered.  Staff regularly conducted room searches to remove potentially harmful objects from her room, as she often hid foil juice tops and plasticware.  On 3/25 she " "reported she had attempted to strangle herself with linens on 3/23 and 3/24, and linens were removed from her room.  As of 4/6 she contracted for safety with her linens.  She remained in a room directly across from the desk throughout her hospitalization.  Individual therapy was initiated shortly before her discharge to medical and she stated that it was helpful.     Her mother indicated that the patient is not welcome to reside with her.  Historically she did not put forth efforts to engage in treatment during IRTS placement.  CADI funding and adult foster care placement was pursued.  CADI interview was completed 3/16. She had an interview with [facility] 4/1/21 and has [facility] funding in place.       She was readmitted to the psychiatric unit following treatment with IV antibiotics and medical stabilization.        Her mood is depressed.  She reports suicidal ideation with a plan to strangle herself.  She contracts for safety on the unit.  She reports urges to engage in self-injury by scratching herself.  She has anhedonia.  Concentration is\"pretty good.\"  Energy and motivation are low.  She has feelings of hopelessness, helplessness, worthlessness and guilt.   Her appetite is normal.  Her sleep is \"kind of rough\" with frequent wakening.  Her anxiety is \"up and down\" and higher when she is around other people.  She denies irritability.  She has some racing thoughts.  She reports auditory hallucinations of Satan's voice, fairly constant but at the volume of a whisper, instructing her not to take medications, to commit suicide, and making derogatory comments. She denies symptoms of OCD and jey.  She denies homicidal ideation.       See full admission note by Hina Morris NP 4/12/2021 for details.           Diagnoses:     Borderline personality disorder  Major depressive disorder, recurrent, severe with psychotic features  Generalized anxiety disorder         Labs:      Ref. Range 4/17/2021 10:34 "   SARS-CoV-2 Virus Specimen Source Unknown Nasal   SARS-CoV-2 PCR Result Unknown NEGATIVE          Consults:     No consultations were requested during this admission.         Hospital Course:     Misty Parham was admitted to Station 32[facility] with attending Zhang Hargrove MD, under the direct care of Hina Morris NP under an ongoing civil commitment.  The patient was placed under status 15 (15 minute checks) to ensure patient safety.     Seroquel  mg BID was continued.  Rozerem 8 mg at HS was continued.  Protriptyline 10 mg at HS was continued.  Effexor  mg daily was continued.  Wellbutrin  mg daily was continued.  Lamictal was increased from 100 mg to 200 mg daily.  Klonopin 0.5 mg BID PRN was continued.  She experienced dry mouth and was given Biotene.  She otherwise tolerated medications well.      A room lock out was initiated from 9:30 AM - 12 PM, 1 PM - 3 PM and 5 PM to 9 PM.  She spent some time in the milieu reading and watching TV but did not attend any groups as she stated she had been to them during previous hospitalizations and did not find them beneficial.  She did not engage in any self-injurious behaviors.      The patient's symptoms of depression improved.  She denied urges to engage in self-injurious behaviors.  Her suicidal thoughts were typically low, or she denied them altogether.  She denied suicidal ideation on the day of discharge.  She continued to endorse auditory hallucinations of Satan's voice at the volume of a whisper making derogatory comments, telling her to not take medications, and to commit suicide.  She stated that she would not act upon these.  There was no evidence of paranoid/delusional thought content.  She was documented as sleeping well but reported some difficulty sleeping.  Appetite was good.  Her concentration was improved.  Energy remained rather low.      Misty Parham was released to [facility] on a provisional discharge.  At the time of  "discharge Misty Parham was determined to not be a danger to herself or others.          Discharge Medications:      Misty Parham   Home Medication Instructions RAFAEL:[id number]    Printed on:04/20/21 0445   Medication Information                      artificial saliva (BIOTENE MT) SOLN solution  Swish and spit 2 mLs (2 sprays) in mouth 2 times daily             buPROPion (WELLBUTRIN XL) 300 MG 24 hr tablet  Take 1 tablet (300 mg) by mouth daily             clonazePAM (KLONOPIN) 0.5 MG tablet  Take 1 tablet (0.5 mg) by mouth 2 times daily as needed for anxiety  #60 dispensed           lamoTRIgine (LAMICTAL) 200 MG tablet  Take 1 tablet (200 mg) by mouth every morning             polyethylene glycol (MIRALAX) 17 GM/Dose powder  Take 17 g by mouth daily             protriptyline (VIVACTIL) 10 MG tablet  Take 1 tablet (10 mg) by mouth At Bedtime             QUEtiapine ER (SEROQUEL XR) 300 MG 24 hr tablet  Take 1 tablet (300 mg) by mouth 2 times daily             ramelteon (ROZEREM) 8 MG tablet  Take 1 tablet (8 mg) by mouth At Bedtime             rOPINIRole (REQUIP) 1 MG tablet  Take 1 tablet (1 mg) by mouth At Bedtime             venlafaxine (EFFEXOR-XR) 75 MG 24 hr capsule  Take 3 capsules (225 mg) by mouth daily (with breakfast)                      Psychiatric Examination:     Appearance:  alert, adequate grooming/hygiene   Attitude:  cooperative   Eye Contact:  good   Mood: \"alright\" anxious related to discharge  Affect:  intensity is blunted  Speech:  quiet, soft  Language: fluent and intact in English  Psychomotor, Gait, Musculoskeletal:  no evidence of tardive dyskinesia, dystonia, or tics  Thought Process:  goal oriented, linear, less than logical  Associations:  no loose associations  Thought Content:  denies homicidal ideation, denies visual hallucinations, reports auditory hallucinations of Channing's voice at the volume of a whisper commanding her to commit suicide (but states she would not act upon these) " and making derogatory comments, denies suicidal ideation, denies urges to engage in self-injury  Insight:  limited  Judgement:  limited  Oriented to:  month/year, time, person, and place   Attention Span and Concentration:  fair, improving  Recent and Remote Memory:  intact  Fund of Knowledge:  appropriate    BP 96/67 (BP Location: Left arm)   Pulse 81   Temp 98.1  F (36.7  C) (Oral)   Resp 16   SpO2 97%          Discharge Plan:     Per Discharge AVS:    You were admitted on 4/12/2021 due to suicidal ideations.  You were treated by Hina Morris CNP and discharged on 4/20/2021 from station 32 to [facility]  [address] [place] [address]  Phone: [phone]  FAX: [phone]    You were [facility]. This was continued on 2/26/2020. You are being discharged on a Provisional Discharge Agreement which shall remain in effect for the duration of the Commitment which expires on 5/26/2021. You do have a court date for recommitment on 5/18/2021.  You are also court ordered to take the medications that the doctor ordered for you.     Health Care Follow-up:     PCP - Kathleen Spencer - [facility] was not available until May and you need to be seen sooner so an appointment was set for:  Thursday April 22, 2021 @ 8am in person with Dr. Zuleika Edward  Address: [address] [place] [address] [place] [address]  Phone: [phone] fax: [phone]     Psychiatry - Dr. Hernández - [facility]   Dr. Hernández is out until July 6 so a follow up appointment will be with Colby Karimi Wednesday 4/28/21 10:30 to 11:30am for now.  This will be by phone.  [facility]  [address] [place]  Ph: [phone]    Fx: [phone]     Therapy - Anderson Moss [facility]  Follow up appointment scheduled on 4/29/21 @ 2pm  [facility]  [address]      [facility]:  [phone]    Fx:  [phone]     [facility]  - Brinda Machuca ([phone] / fax [phone])        A 30-day supply of medications was e-prescribed to [facility].  She was advised to take her medications as prescribed and to abstain from alcohol and illicit substances.        Attestation:  The patient has been seen and evaluated by me, WILLIAN Sánchez CNP   Discharge time > 30 minutes      Clinical Global Impressions  First:  Considering your total clinical experience with this particular patient population, how severe are the patient's symptoms at this time?: 6 (04/15/21 0626)  Compared to the patient's condition at the START of treatment, this patient's condition is: 3 (04/15/21 0626)  Most recent:  Considering your total clinical experience with this particular patient population, how severe are the patient's symptoms at this time?: 6 (04/20/21 0912)  Compared to the patient's condition at the START of treatment, this patient's condition is: 3 (04/20/21 0912)

## 2021-05-21 ENCOUNTER — HOSPITAL ENCOUNTER (INPATIENT)
Facility: CLINIC | Age: 36
LOS: 6 days | Discharge: NURSING, REHAB OR LONG-TERM CARE | End: 2021-05-28
Attending: EMERGENCY MEDICINE | Admitting: PSYCHIATRY & NEUROLOGY
Payer: COMMERCIAL

## 2021-05-21 DIAGNOSIS — F33.3 SEVERE RECURRENT MAJOR DEPRESSION WITH PSYCHOTIC FEATURES (H): ICD-10-CM

## 2021-05-21 DIAGNOSIS — R45.851 SUICIDAL IDEATION: ICD-10-CM

## 2021-05-21 DIAGNOSIS — F29 PSYCHOSIS, UNSPECIFIED PSYCHOSIS TYPE (H): ICD-10-CM

## 2021-05-21 DIAGNOSIS — Z11.52 ENCOUNTER FOR SCREENING LABORATORY TESTING FOR SEVERE ACUTE RESPIRATORY SYNDROME CORONAVIRUS 2 (SARS-COV-2): ICD-10-CM

## 2021-05-21 DIAGNOSIS — F41.1 GENERALIZED ANXIETY DISORDER: ICD-10-CM

## 2021-05-21 DIAGNOSIS — R44.0 AUDITORY HALLUCINATION: ICD-10-CM

## 2021-05-21 DIAGNOSIS — F60.3 EXPLOSIVE PERSONALITY DISORDER (H): ICD-10-CM

## 2021-05-21 PROCEDURE — C9803 HOPD COVID-19 SPEC COLLECT: HCPCS | Performed by: EMERGENCY MEDICINE

## 2021-05-21 PROCEDURE — 99285 EMERGENCY DEPT VISIT HI MDM: CPT | Mod: 25 | Performed by: EMERGENCY MEDICINE

## 2021-05-21 PROCEDURE — 99284 EMERGENCY DEPT VISIT MOD MDM: CPT | Performed by: EMERGENCY MEDICINE

## 2021-05-21 ASSESSMENT — MIFFLIN-ST. JEOR: SCORE: 1865.23

## 2021-05-22 PROBLEM — F29 PSYCHOSIS, UNSPECIFIED PSYCHOSIS TYPE (H): Status: ACTIVE | Noted: 2021-05-22

## 2021-05-22 PROBLEM — R44.0 AUDITORY HALLUCINATION: Status: ACTIVE | Noted: 2021-05-22

## 2021-05-22 PROCEDURE — 99223 1ST HOSP IP/OBS HIGH 75: CPT | Mod: AI | Performed by: NURSE PRACTITIONER

## 2021-05-22 PROCEDURE — 250N000013 HC RX MED GY IP 250 OP 250 PS 637: Performed by: NURSE PRACTITIONER

## 2021-05-22 PROCEDURE — 124N000002 HC R&B MH UMMC

## 2021-05-22 PROCEDURE — 87635 SARS-COV-2 COVID-19 AMP PRB: CPT | Performed by: EMERGENCY MEDICINE

## 2021-05-22 PROCEDURE — 250N000011 HC RX IP 250 OP 636: Performed by: NURSE PRACTITIONER

## 2021-05-22 PROCEDURE — 90791 PSYCH DIAGNOSTIC EVALUATION: CPT

## 2021-05-22 RX ORDER — POLYETHYLENE GLYCOL 3350 17 G/17G
17 POWDER, FOR SOLUTION ORAL DAILY
Status: DISCONTINUED | OUTPATIENT
Start: 2021-05-22 | End: 2021-05-25

## 2021-05-22 RX ORDER — VENLAFAXINE HYDROCHLORIDE 150 MG/1
150 CAPSULE, EXTENDED RELEASE ORAL
Status: DISCONTINUED | OUTPATIENT
Start: 2021-05-22 | End: 2021-05-25

## 2021-05-22 RX ORDER — ROPINIROLE 1 MG/1
1 TABLET, FILM COATED ORAL AT BEDTIME
Status: DISCONTINUED | OUTPATIENT
Start: 2021-05-22 | End: 2021-05-28 | Stop reason: HOSPADM

## 2021-05-22 RX ORDER — CLONAZEPAM 0.5 MG/1
0.5 TABLET ORAL 2 TIMES DAILY PRN
Status: DISCONTINUED | OUTPATIENT
Start: 2021-05-22 | End: 2021-05-28 | Stop reason: HOSPADM

## 2021-05-22 RX ORDER — MAGNESIUM HYDROXIDE/ALUMINUM HYDROXICE/SIMETHICONE 120; 1200; 1200 MG/30ML; MG/30ML; MG/30ML
30 SUSPENSION ORAL EVERY 4 HOURS PRN
Status: DISCONTINUED | OUTPATIENT
Start: 2021-05-22 | End: 2021-05-28 | Stop reason: HOSPADM

## 2021-05-22 RX ORDER — PROTRIPTYLINE HYDROCHLORIDE 10 MG/1
10 TABLET, FILM COATED ORAL AT BEDTIME
Status: DISCONTINUED | OUTPATIENT
Start: 2021-05-22 | End: 2021-05-28 | Stop reason: HOSPADM

## 2021-05-22 RX ORDER — SALIVA STIMULANT COMB. NO.3
2 SPRAY, NON-AEROSOL (ML) MUCOUS MEMBRANE 2 TIMES DAILY
Status: DISCONTINUED | OUTPATIENT
Start: 2021-05-22 | End: 2021-05-25

## 2021-05-22 RX ORDER — TRAZODONE HYDROCHLORIDE 50 MG/1
50 TABLET, FILM COATED ORAL
Status: DISCONTINUED | OUTPATIENT
Start: 2021-05-22 | End: 2021-05-28 | Stop reason: HOSPADM

## 2021-05-22 RX ORDER — QUETIAPINE FUMARATE 50 MG/1
150 TABLET, EXTENDED RELEASE ORAL 2 TIMES DAILY
Status: DISCONTINUED | OUTPATIENT
Start: 2021-05-22 | End: 2021-05-25

## 2021-05-22 RX ORDER — LAMOTRIGINE 25 MG/1
25 TABLET ORAL DAILY
Status: DISCONTINUED | OUTPATIENT
Start: 2021-05-22 | End: 2021-05-25

## 2021-05-22 RX ORDER — RAMELTEON 8 MG/1
8 TABLET ORAL AT BEDTIME
Status: DISCONTINUED | OUTPATIENT
Start: 2021-05-22 | End: 2021-05-28 | Stop reason: HOSPADM

## 2021-05-22 RX ORDER — BUPROPION HYDROCHLORIDE 150 MG/1
150 TABLET ORAL EVERY MORNING
COMMUNITY
End: 2021-11-28

## 2021-05-22 RX ORDER — BUPROPION HYDROCHLORIDE 300 MG/1
300 TABLET ORAL DAILY
Status: DISCONTINUED | OUTPATIENT
Start: 2021-05-22 | End: 2021-05-25

## 2021-05-22 RX ORDER — ACETAMINOPHEN 325 MG/1
650 TABLET ORAL EVERY 4 HOURS PRN
Status: DISCONTINUED | OUTPATIENT
Start: 2021-05-22 | End: 2021-05-28 | Stop reason: HOSPADM

## 2021-05-22 RX ORDER — OLANZAPINE 10 MG/2ML
10 INJECTION, POWDER, FOR SOLUTION INTRAMUSCULAR 2 TIMES DAILY
Status: DISCONTINUED | OUTPATIENT
Start: 2021-05-22 | End: 2021-05-25

## 2021-05-22 RX ORDER — QUETIAPINE FUMARATE 50 MG/1
150 TABLET, EXTENDED RELEASE ORAL 2 TIMES DAILY
Status: DISCONTINUED | OUTPATIENT
Start: 2021-05-22 | End: 2021-05-22

## 2021-05-22 RX ORDER — OLANZAPINE 10 MG/2ML
10 INJECTION, POWDER, FOR SOLUTION INTRAMUSCULAR 3 TIMES DAILY PRN
Status: DISCONTINUED | OUTPATIENT
Start: 2021-05-22 | End: 2021-05-28 | Stop reason: HOSPADM

## 2021-05-22 RX ORDER — QUETIAPINE 300 MG/1
300 TABLET, FILM COATED, EXTENDED RELEASE ORAL 2 TIMES DAILY
Status: DISCONTINUED | OUTPATIENT
Start: 2021-05-22 | End: 2021-05-22

## 2021-05-22 RX ORDER — OLANZAPINE 10 MG/1
10 TABLET ORAL 3 TIMES DAILY PRN
Status: DISCONTINUED | OUTPATIENT
Start: 2021-05-22 | End: 2021-05-28 | Stop reason: HOSPADM

## 2021-05-22 RX ORDER — HYDROXYZINE HYDROCHLORIDE 25 MG/1
25 TABLET, FILM COATED ORAL EVERY 4 HOURS PRN
Status: DISCONTINUED | OUTPATIENT
Start: 2021-05-22 | End: 2021-05-28 | Stop reason: HOSPADM

## 2021-05-22 RX ADMIN — Medication 2 SPRAY: at 21:01

## 2021-05-22 RX ADMIN — OLANZAPINE 10 MG: 10 INJECTION, POWDER, LYOPHILIZED, FOR SOLUTION INTRAMUSCULAR at 11:11

## 2021-05-22 RX ADMIN — OLANZAPINE 10 MG: 10 INJECTION, POWDER, LYOPHILIZED, FOR SOLUTION INTRAMUSCULAR at 21:54

## 2021-05-22 ASSESSMENT — ACTIVITIES OF DAILY LIVING (ADL)
WEAR_GLASSES_OR_BLIND: YES
TOILETING_ISSUES: NO
DRESS: INDEPENDENT
DOING_ERRANDS_INDEPENDENTLY_DIFFICULTY: YES
LAUNDRY: UNABLE TO COMPLETE
ORAL_HYGIENE: INDEPENDENT
WALKING_OR_CLIMBING_STAIRS_DIFFICULTY: NO
ORAL_HYGIENE: INDEPENDENT
DRESSING/BATHING_DIFFICULTY: NO
HYGIENE/GROOMING: INDEPENDENT
FALL_HISTORY_WITHIN_LAST_SIX_MONTHS: NO
DIFFICULTY_EATING/SWALLOWING: NO
DRESS: INDEPENDENT
CONCENTRATING,_REMEMBERING_OR_MAKING_DECISIONS_DIFFICULTY: YES
LAUNDRY: WITH SUPERVISION
HYGIENE/GROOMING: INDEPENDENT
HEARING_DIFFICULTY_OR_DEAF: NO
DIFFICULTY_COMMUNICATING: NO

## 2021-05-22 ASSESSMENT — MIFFLIN-ST. JEOR: SCORE: 1871.13

## 2021-05-22 NOTE — H&P
History and Physical    Misty Parham MRN# [id number]   Age: 35 year old YOB: 1985     Date of Admission:  5/21/2021          Contacts:     PCP - Kathleen Spencer - [facility]   Address: [address] [place] [address] [place] [address]  Phone: [phone]   Fax: [phone]     Psychiatry - Dr. Hernández - [facility]  [address] [place]  Phone: [phone]      Fax: [phone]     Therapy - Anderson Moss, [facility]  [address] [place]     [place]  Phone:  [phone]      Fax:  [phone]     [facility]  - Brinda Machuca ([phone] / fax [phone])     Group Home - [facility]  [address] [place] [address]  Phone: [phone]  Fax: [phone]    Pharmacy - [facility]         Diagnoses:     Borderline personality disorder  Major depressive disorder, recurrent, severe with psychotic features  Generalized anxiety disorder          Recommendations:     Admit to Unit: 30N    Attending Physician: Dr. Hargrove, under the direct care of Hina Morris NP    Patient is on a 72 hour mental health hold and MI commitment with [facility] (Zyprexa, Seroquel, Latuda and Abilify) until 5/26/2021.  Her recommitment hearing was 5/18/2021 and she reports the outcome has not yet been determined, although according to the DEC note she was recommitted MI in [facility] through 5/25/2022.  Will need to acquire new commitment paperwork.      Routine lab studies have been requested.    Monitor for target symptoms.     Provide a safe environment and therapeutic milieu.     Recommend that she remains in a room close to the desk.  If she engages or has urges to engage in self-injury, thorough environmental checks should be completed to ensure there are no potentially harmful objects in her room (has a history of hiding plasticware in her room).  Historically she has had urges to strangle herself  "and has been unable to contract for safety with linens and they have had to be removed from her room; this is not presently the case and she may have linens in her room.  Historically room lock outs have been implemented to discourage daytime napping and encourage participation in unit activities.     Medications:  She had not been taking her medications for 1 week prior to admission and states she does not plan to take any while hospitalized.  Restart Requip 1 mg at HS.  Restart Protriptyline 10 mg at HS.  Restart Rozerem 8 mg at HS.  Restart Wellbutrin XL at 300 mg daily (PTA dose 450 mg)  Restart Lamictal titration at 25 mg daily.  Restart Effexor XR at 150 mg daily (PTA dose 225 mg).  Restart Seroquel XR at 150 mg BID (PTA dose 300 mg BID) with a back up of IM Zyprexa 10 mg available per [facility].  Continue PRN Klonopin.      Likely plan to discharge to [facility] when stable.        Attestation:  Patient has been seen and evaluated by me, WILLIAN Sánchez CNP  The patient was counseled on nature of illness and treatment plan/options  Care was coordinated with treatment team       Clinical Global Impressions  First:  Considering your total clinical experience with this particular patient population, how severe are the patient's symptoms at this time?: 7 (05/22/21 1251)  Compared to the patient's condition at the START of treatment, this patient's condition is: 4 (05/22/21 1251)  Most recent:  Considering your total clinical experience with this particular patient population, how severe are the patient's symptoms at this time?: 7 (05/22/21 1251)  Compared to the patient's condition at the START of treatment, this patient's condition is: 4 (05/22/21 1251)           Chief Complaint:     History is obtained from the patient and electronic health record.    \"I'm not in the mood to talk.\"          History of Present Illness:        Misty Parham is a 35-year-old female admitted to [facility] " "Station 30N on 5/22/2021.  She was admitted on a 72-hour hold and ongoing commitment MI with Bhargav due to depressive symptoms, suicidal ideation and auditory hallucinations commanding her to commit suicide.  She was discharged from [facility] to [facility] on 4/20/2021.  Provider during that hospitalization had filed a petition for commitment MI with Bhargav in [facility] for recommitment at the request of her outpatient .  Her original commitment was through 5/26/2021.  The patient went to court on 5/18/2021 and reports the outcome has not yet been determined, though per the DEC assessment she was recommitted through 5/25/2022.  She said that her experience at [facility] has been \"okay.\"  Stressors include the fact that her 14-year-old son will be going to [facility] with family for 1 year on 5/24.  She stopped taking medications about a week prior to admission, explaining that \"I could get up in the morning\" to take them.  She tried climbing a fence [place] [address] with the intention of being hit by a truck.  She was lying in bed, stated she did not want to talk, and participated minimally in the admission assessment.         Psychiatric Review of Systems:      Her mood is depressed.  She feels hopeless.  She reports suicidal ideation.  She would not disclose a plan and stated that she would not have access to the means while hospitalized and contracts for safety on the unit.  She reports some urges to self-harm by scratching/cutting and contracts for safety in this regard.  She reports that she doesn't fall asleep until 5 AM, kept awake by \"thinking too much.\"  She has anhedonia.  She says her appetite is low but denies weight changes.  Anxiety is high.  She reports constant, loud auditory hallucinations of Satan's voice commanding her to commit suicide.  She denies visual hallucinations.  She denies paranoid/delusional thought content.           Medical Review of " Systems:     A 10-point review of systems was completed and is negative with the exception of HPI.           Psychiatric History:     Psychiatric Hospitalizations:  Several, most recently 6/2020, 2/10/2021 - 4/7/2021 and 4/12/2021 - 4/20/2021.     Prior ECT: She had ECT in 2/2020 - 4/2020 but had cognitive impairment with a MoCA score of 9/30 and improvements in symptoms noted only on treatment days     Court Commitment:  Under current commitment MI in [facility]     Suicide Attempts:  Twice, both via overdose     Self-injurious Behavior:  Scratching self      Violence toward others:  Denies     Use of Psychotropics:  She has tried numerous medications, some of which have included Zoloft, Prozac, Lexapro, Paxil, Effexor, Pristiq, Remeron, Wellbutrin, Trintellix, lithium (caused tremor), Lamictal, Rexulti, protriptyline and nortriptyline           Substance Use History:     No history of substance abuse.  She does not consume alcohol, nor dose she use nicotine.            Past Medical History:     Parotitis resulted in medical hospitalization 4/7/2021 - 4/12/2021         Past Surgical History:     Cholecystectomy         Allergies:      No known allergies           Medications:     artificial saliva (BIOTENE MT) SOLN solution Swish and spit 2 mLs (2 sprays) in mouth 2 times daily   buPROPion (WELLBUTRIN XL) 150 MG 24 hr tablet Take 150 mg by mouth every morning Take along with 300 mg tablet for a total daily dose of 450 mg   buPROPion (WELLBUTRIN XL) 300 MG 24 hr tablet Take 1 tablet (300 mg) by mouth daily  Patient taking differently: Take 300 mg by mouth daily Take along with 150 mg tablet for a total daily dose of 450 mg   clonazePAM (KLONOPIN) 0.5 MG tablet Take 1 tablet (0.5 mg) by mouth 2 times daily as needed for anxiety   lamoTRIgine (LAMICTAL) 200 MG tablet Take 1 tablet (200 mg) by mouth every morning   polyethylene glycol (MIRALAX) 17 GM/Dose powder Take 17 g by mouth daily   protriptyline (VIVACTIL)  10 MG tablet Take 1 tablet (10 mg) by mouth At Bedtime   QUEtiapine ER (SEROQUEL XR) 300 MG 24 hr tablet Take 1 tablet (300 mg) by mouth 2 times daily   ramelteon (ROZEREM) 8 MG tablet Take 1 tablet (8 mg) by mouth At Bedtime   rOPINIRole (REQUIP) 1 MG tablet Take 1 tablet (1 mg) by mouth At Bedtime   venlafaxine (EFFEXOR-XR) 75 MG 24 hr capsule Take 3 capsules (225 mg) by mouth daily (with breakfast)           Social History:     Upbringing:  Grew up in [place], raised by mother, has 3 siblings     Educational History:  High school     Relationships:  ; remains in contact with her ex who is supportive     Children:  18-year-old son lives with family in the [facility], and she reports her 14-year-old son will be going to [facility] for 1 year with family on 5/24     Current Living Situation: [facility]     Occupational History:  Unemployed    Financial Support:  Family and SSDI     Legal History:  Denies     Abuse/Trauma History:  Denies         Family History:     Her mother has depression and alcohol use disorder.  Her father possibly has substance use and mental health issues.  Her oldest son has mental illness.  No family history of suicides.           Labs:      Ref. Range 5/22/2021 03:42   SARS-CoV-2 Virus Specimen Source Unknown Nasopharyngeal   SARS-CoV-2 PCR Result Unknown NEGATIVE          Psychiatric Examination:     Appearance:  somnolent, fair grooming/hygiene   Attitude:  somewhat cooperative   Eye Contact:  minimal, mostly lying in bed with eyes closed  Mood: depressed, anxious  Affect:  intensity is blunted  Speech:  quiet, soft  Language: fluent and intact in English  Psychomotor, Gait, Musculoskeletal:  no evidence of tardive dyskinesia, dystonia, or tics  Thought Process:  goal oriented, linear, less than logical  Associations:  no loose associations  Thought Content:  denies homicidal ideation, denies visual hallucinations, reports auditory hallucinations of Satan's voice  "commanding her to commit suicide (but states she would not act upon these) and making derogatory comments, reports suicidal ideation and urges to engage in self-injury and contracts for safety on the unit  Insight:  limited  Judgement:  limited  Oriented to:  month/year, time, person, and place   Attention Span and Concentration:  some impairment  Recent and Remote Memory:  intact  Fund of Knowledge:  appropriate    BP 96/73   Pulse 118   Temp 97.4  F (36.3  C) (Oral)   Resp 16   Ht 1.651 m (5' 5\")   Wt 118.5 kg (261 lb 4.8 oz)   SpO2 98%   BMI 43.48 kg/m             Physical Exam:     Please refer to the physical exam completed by Dr. Avila in the ER 5/21/2021.   "

## 2021-05-22 NOTE — ED NOTES
Pt given copy of 72 hour hold and Pt Rights.  Pt complied with COVID test.  Pt reminded of need for urine sample.  Pt stated unable to produce at this time.

## 2021-05-22 NOTE — PHARMACY-ADMISSION MEDICATION HISTORY
Admission Medication History Completed by Pharmacy    See [facility] Admission Navigator for allergy information, preferred outpatient pharmacy, prior to admission medications and immunization status.     Medication history sources:  Patient's Group Home ([place]) MAR; Discharge Summary from 4/20/21     Changes made to PTA medication list (reason)  Added:   - Wellbutrin  mg tablets   Deleted: N/A  Changed:   - Wellbutrin  mg tablets --> Take 300 mg tablet along with 150 mg tablet for a total daily dose of 450 mg (Confirmed with Group Home MAR, this changed was made on 4/29/21)     Additional medication history information:   - Per group home, patient has not been taking her medications in about 1 week.   - No other additional Rx/OTC medications were found on group home MAR other than the ones listed below.     Prior to Admission medications    Medication Sig Last Dose Taking? Auth Provider   artificial saliva (BIOTENE MT) SOLN solution Swish and spit 2 mLs (2 sprays) in mouth 2 times daily Past Week Yes Florencio Howard MD   buPROPion (WELLBUTRIN XL) 150 MG 24 hr tablet Take 150 mg by mouth every morning Take along with 300 mg tablet for a total daily dose of 450 mg Past Week Yes Unknown, Entered By History   buPROPion (WELLBUTRIN XL) 300 MG 24 hr tablet Take 1 tablet (300 mg) by mouth daily  Patient taking differently: Take 300 mg by mouth daily Take along with 150 mg tablet for a total daily dose of 450 mg Past Week Yes Florencio Howard MD   clonazePAM (KLONOPIN) 0.5 MG tablet Take 1 tablet (0.5 mg) by mouth 2 times daily as needed for anxiety Past Week Yes Nissa Morris APRN CNP   lamoTRIgine (LAMICTAL) 200 MG tablet Take 1 tablet (200 mg) by mouth every morning Past Week Yes Florencio Howard MD   polyethylene glycol (MIRALAX) 17 GM/Dose powder Take 17 g by mouth daily Past Week Yes Florencio Howard MD   protriptyline (VIVACTIL) 10 MG tablet Take 1 tablet (10 mg) by mouth At  Bedtime Past Week Yes Florencio Howard MD   QUEtiapine ER (SEROQUEL XR) 300 MG 24 hr tablet Take 1 tablet (300 mg) by mouth 2 times daily Past Week Yes Florencio Howard MD   ramelteon (ROZEREM) 8 MG tablet Take 1 tablet (8 mg) by mouth At Bedtime Past Week Yes Florencio Howard MD   rOPINIRole (REQUIP) 1 MG tablet Take 1 tablet (1 mg) by mouth At Bedtime Past Week Yes Florencio Howard MD   venlafaxine (EFFEXOR-XR) 75 MG 24 hr capsule Take 3 capsules (225 mg) by mouth daily (with breakfast) Past Week Yes Florencio Howard MD          Date completed: 05/22/21    Medication history completed by:   Marbella Garcia, PharmD  PGY-1 Pharmacy Resident - Behavioral Health Services   [facility] Building: [place] *[id number] / *[phone] or [phone]

## 2021-05-22 NOTE — ED NOTES
Bed: HW04  Expected date: 5/21/21  Expected time: 10:51 PM  Means of arrival: Ambulance  Comments:  H421  35F  SI on a hold
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
## 2021-05-22 NOTE — PLAN OF CARE
In bed all day refused breakfast or lunch.  She also refused medication. IM Zyprexa given per MD ordered. Nothing to say to this staff or others. Sleeping on and off all day.

## 2021-05-22 NOTE — PLAN OF CARE
Problem: Suicidal Behavior  Goal: Suicidal Behavior is Absent or Managed  Outcome: No Change   Pt admitted to station 30 at 0435 for psychosis, AH and SI on a 72 hours hold in the presence of attempting to walk into a traffic. Pt is a 35 year old female who has history of depressive disorder and borderline personality. Pt was cooperative with initial search, orientation to the unit, and admission profile completion. Affect flat, mood depressed but she was calm and pleasant.Pt is committed, and  she comes from a .She has been off her meds for one week.She has command AH resulting into trying to kill herself.She currently endorsed SI(thoughts only) and she was able to contract for safety.Folder given,new orders received and pt has no concerns.She is Status 15 and is on suicide precaution. Will cont to monitor. She fell asleep at 0600.    Allergies: NKA    Legal status: 72 hrs hold    Current stressors:None    Social:   Pt currently lives with in a ( [facility]). Pt  Does not works.  -Romantic relationships: None    Psych:   -Previous hospitalizations: Yes ,was recently here on station 32    -Recent symptoms leading to admission: Off meds for 1 week   -Anxiety: yes   -Depression: yes   -Delusions: No   -Hallucinations: yes ,AH   -Behaviors: calm  -Suicide:    -History of attempts: Yes by overdose on pills   -Over life time: yes   -Recent thoughts/plans/intent: yes   -Currently: Thoughts only  -Self-injurious behavior: Cutting    CD: NA,pt denies any use    -Inpatient treatments: yes  -Outpatient treatments: yes  -Other resources: unknown    -Smoker: never a smoker    Medications: See MAR please      Abuse history: No  -    Medical:None   -Appetite: poor  -Sleep: Not very good  -Pain: no    Precautions: Suicide  SIO: no    Pt has no goal/hope with this admission.

## 2021-05-22 NOTE — PROGRESS NOTES
05/22/21 0521   Patient Belongings   Did you bring any home meds/supplements to the hospital?  No   Patient Belongings other (see comments)   Patient Belongings Put in Hospital Secure Location (Security or Locker, etc.) other (see comments)   Belongings Search Yes   Clothing Search Yes   Second Staff Radha Pope I RNs     NO MARTINEZ/CREDIT    Exam Room Locker: Phone, clothing    A               Admission:  I am responsible for any personal items that are not sent to the safe or pharmacy.  [facility] is not responsible for loss, theft or damage of any property in my possession.    Signature:  _________________________________ Date: _______  Time: _____                                              Staff Signature:  ____________________________ Date: ________  Time: _____      2nd Staff person, if patient is unable/unwilling to sign:    Signature: ________________________________ Date: ________  Time: _____     Discharge:  [facility] has returned all of my personal belongings:    Signature: _________________________________ Date: ________  Time: _____                                          Staff Signature:  ____________________________ Date: ________  Time: _____

## 2021-05-22 NOTE — ED NOTES
Report given to Jeancarlos Carrasco RN @ [phone], per request,  Pt will be admitted to hospital.

## 2021-05-22 NOTE — ED NOTES
"ED to Behavioral Floor Handoff    SITUATION  Misty Parham is a 35 year old female who speaks English and lives in a group home with others The patient arrived in the ED by ambulance from home with a complaint of Suicidal (Pt coming from [facility]. Tried climbing a fence to 94 tonight. Off meds x 1 week. Hx SA by OD on meds 3 months ago.) and Hallucinations (Command AH telling pt to kill self.)  .The patient's current symptoms started/worsened 1 week(s) ago and during this time the symptoms have increased.   In the ED, pt was diagnosed with   Final diagnoses:   Psychosis, unspecified psychosis type (H)   Auditory hallucination   Suicidal ideation        Initial vitals were: Pulse: 98  Temp: 98.4  F (36.9  C)  Resp: 16  Height: 165.1 cm (5' 5\")  Weight: 117.9 kg (260 lb)  SpO2: 98 %   --------  Is the patient diabetic? No   If yes, last blood glucose? --     If yes, was this treated in the ED? --  --------  Is the patient inebriated (ETOH) No or Impaired on other substances? No  MSSA done? N/A  Last MSSA score: --    Were withdrawal symptoms treated? N/A  Does the patient have a seizure history? No. If yes, date of most recent seizure--  --------  Is the patient patient experiencing suicidal ideation? reports the following suicide factors: Chronic thoughts    Homicidal ideation? denies current or recent homicidal ideation or behaviors.    Self-injurious behavior/urges? denies current or recent self injurious behavior or ideation.  ------  Was pt aggressive in the ED No  Was a code called No  Is the pt now cooperative? Yes  -------  Meds given in ED: Medications - No data to display   Family present during ED course? No  Family currently present? No    BACKGROUND  Does the patient have a cognitive impairment or developmental disability? Yes  Allergies: No Known Allergies.   Social demographics are   Social History     Socioeconomic History     Marital status: Legally      Spouse name: None     Number of " "children: None     Years of education: None     Highest education level: None   Occupational History     None   Social Needs     Financial resource strain: None     Food insecurity     Worry: None     Inability: None     Transportation needs     Medical: None     Non-medical: None   Tobacco Use     Smoking status: Never Smoker     Smokeless tobacco: Never Used   Substance and Sexual Activity     Alcohol use: Not Currently     Frequency: Never     Drug use: Never     Sexual activity: None   Lifestyle     Physical activity     Days per week: None     Minutes per session: None     Stress: None   Relationships     Social connections     Talks on phone: None     Gets together: None     Attends [employer] service: None     Active member of club or organization: None     Attends meetings of clubs or organizations: None     Relationship status: None     Intimate partner violence     Fear of current or ex partner: None     Emotionally abused: None     Physically abused: None     Forced sexual activity: None   Other Topics Concern     None   Social History Narrative     None        ASSESSMENT  Labs results   Labs Ordered and Resulted from Time of ED Arrival Up to the Time of Departure from the ED   SARS-COV-2 (COVID-19) VIRUS RT-PCR   DRUG ABUSE SCR  UR W QNT REFLX   HCG QUALITATIVE URINE   DOCUMENT IN LEGAL HOLD NAVIGATOR   DOCUMENT      Imaging Studies: No results found for this or any previous visit (from the past 24 hour(s)).   Most recent vital signs Pulse 98   Temp 98.4  F (36.9  C) (Oral)   Resp 16   Ht 1.651 m (5' 5\")   Wt 117.9 kg (260 lb)   SpO2 98%   BMI 43.27 kg/m     Abnormal labs/tests/findings requiring intervention:---   Pain control: pt had none  Nausea control: pt had none    RECOMMENDATION  Are any infection precautions needed (MRSA, VRE, etc.)? No If yes, what infection? --  ---  Does the patient have mobility issues? independently. If yes, what device does the pt use? ---  ---  Is patient on " 72 hour hold or commitment? Yes If on 72 hour hold, have hold and rights been given to patient? Yes  Are admitting orders written if after 10 p.m. ?N/A  Tasks needing to be completed:---     Janette ESCUDERO, RN      [phone] [facility]
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
## 2021-05-23 LAB
ALBUMIN SERPL-MCNC: 3.5 G/DL (ref 3.4–5)
ALP SERPL-CCNC: 78 U/L (ref 40–150)
ALT SERPL W P-5'-P-CCNC: 18 U/L (ref 0–50)
ANION GAP SERPL CALCULATED.3IONS-SCNC: 4 MMOL/L (ref 3–14)
AST SERPL W P-5'-P-CCNC: 17 U/L (ref 0–45)
BILIRUB SERPL-MCNC: 0.4 MG/DL (ref 0.2–1.3)
BUN SERPL-MCNC: 8 MG/DL (ref 7–30)
CALCIUM SERPL-MCNC: 9.1 MG/DL (ref 8.5–10.1)
CHLORIDE SERPL-SCNC: 106 MMOL/L (ref 94–109)
CHOLEST SERPL-MCNC: 162 MG/DL
CO2 SERPL-SCNC: 29 MMOL/L (ref 20–32)
CREAT SERPL-MCNC: 0.84 MG/DL (ref 0.52–1.04)
ERYTHROCYTE [DISTWIDTH] IN BLOOD BY AUTOMATED COUNT: 15.4 % (ref 10–15)
GFR SERPL CREATININE-BSD FRML MDRD: 89 ML/MIN/{1.73_M2}
GLUCOSE SERPL-MCNC: 90 MG/DL (ref 70–99)
HCT VFR BLD AUTO: 40.8 % (ref 35–47)
HDLC SERPL-MCNC: 40 MG/DL
HGB BLD-MCNC: 12.9 G/DL (ref 11.7–15.7)
LDLC SERPL CALC-MCNC: 106 MG/DL
MCH RBC QN AUTO: 27.7 PG (ref 26.5–33)
MCHC RBC AUTO-ENTMCNC: 31.6 G/DL (ref 31.5–36.5)
MCV RBC AUTO: 88 FL (ref 78–100)
NONHDLC SERPL-MCNC: 122 MG/DL
PLATELET # BLD AUTO: 346 10E9/L (ref 150–450)
POTASSIUM SERPL-SCNC: 3.8 MMOL/L (ref 3.4–5.3)
PROT SERPL-MCNC: 7.2 G/DL (ref 6.8–8.8)
RBC # BLD AUTO: 4.65 10E12/L (ref 3.8–5.2)
SODIUM SERPL-SCNC: 139 MMOL/L (ref 133–144)
TRIGL SERPL-MCNC: 81 MG/DL
TSH SERPL DL<=0.005 MIU/L-ACNC: 1.19 MU/L (ref 0.4–4)
WBC # BLD AUTO: 6.7 10E9/L (ref 4–11)

## 2021-05-23 PROCEDURE — 80061 LIPID PANEL: CPT | Performed by: PSYCHIATRY & NEUROLOGY

## 2021-05-23 PROCEDURE — 84443 ASSAY THYROID STIM HORMONE: CPT | Performed by: PSYCHIATRY & NEUROLOGY

## 2021-05-23 PROCEDURE — 124N000002 HC R&B MH UMMC

## 2021-05-23 PROCEDURE — 36415 COLL VENOUS BLD VENIPUNCTURE: CPT | Performed by: PSYCHIATRY & NEUROLOGY

## 2021-05-23 PROCEDURE — 80053 COMPREHEN METABOLIC PANEL: CPT | Performed by: PSYCHIATRY & NEUROLOGY

## 2021-05-23 PROCEDURE — 85027 COMPLETE CBC AUTOMATED: CPT | Performed by: PSYCHIATRY & NEUROLOGY

## 2021-05-23 PROCEDURE — 250N000013 HC RX MED GY IP 250 OP 250 PS 637: Performed by: NURSE PRACTITIONER

## 2021-05-23 RX ADMIN — BUPROPION HYDROCHLORIDE 300 MG: 300 TABLET, EXTENDED RELEASE ORAL at 09:22

## 2021-05-23 RX ADMIN — RAMELTEON 8 MG: 8 TABLET ORAL at 21:21

## 2021-05-23 RX ADMIN — LAMOTRIGINE 25 MG: 25 TABLET ORAL at 09:24

## 2021-05-23 RX ADMIN — QUETIAPINE FUMARATE 150 MG: 50 TABLET, EXTENDED RELEASE ORAL at 21:21

## 2021-05-23 RX ADMIN — QUETIAPINE FUMARATE 150 MG: 50 TABLET, EXTENDED RELEASE ORAL at 09:22

## 2021-05-23 RX ADMIN — ROPINIROLE HYDROCHLORIDE 1 MG: 1 TABLET, FILM COATED ORAL at 21:21

## 2021-05-23 RX ADMIN — POLYETHYLENE GLYCOL 3350 17 G: 17 POWDER, FOR SOLUTION ORAL at 09:26

## 2021-05-23 RX ADMIN — Medication 2 SPRAY: at 09:23

## 2021-05-23 RX ADMIN — Medication 2 SPRAY: at 21:22

## 2021-05-23 RX ADMIN — PROTRIPTYLINE HYDROCHLORIDE 10 MG: 10 TABLET ORAL at 21:21

## 2021-05-23 RX ADMIN — VENLAFAXINE HYDROCHLORIDE 150 MG: 150 CAPSULE, EXTENDED RELEASE ORAL at 09:22

## 2021-05-23 ASSESSMENT — ACTIVITIES OF DAILY LIVING (ADL)
DRESS: INDEPENDENT
LAUNDRY: WITH SUPERVISION
HYGIENE/GROOMING: INDEPENDENT;PROMPTS
LAUNDRY: WITH SUPERVISION
ORAL_HYGIENE: INDEPENDENT
HYGIENE/GROOMING: INDEPENDENT
DRESS: SCRUBS (BEHAVIORAL HEALTH)
ORAL_HYGIENE: INDEPENDENT;PROMPTS

## 2021-05-23 NOTE — PLAN OF CARE
Problem: Sleep Disturbance  Goal: Adequate Sleep/Rest  Outcome: Improving     Patient appeared to be asleep for 3.5 hours during safety checks this shift. No complaints or concerns voiced by patient or noted by staff. Will continue to monitor and update if there are changes.

## 2021-05-23 NOTE — PLAN OF CARE
In bed most of day shift.  She did sit up and allow VS to be taken took all he PO meds and ate breakfast.  She denies feeling suicidal ideation, still having some auditory hallucinations. She is very quiet whispers when she talks. Withdrawn to room.  Poor ADL'S  appetite fair. will encourage some socialization.

## 2021-05-23 NOTE — PROGRESS NOTES
Patient has been asked earlier in the evening what times she wanted to take her medication. Patient stated that she wouldn't take anything. Patient did use the biotene when handed to her. Writer initially went in pt's room with the IM etienne zyprexa with 2 other staff.and asked patient if it could be given in her arm or buttock. Patient then proceeded to stand up on her mattress, when staff tried to assist her back down for her physical safety, patient became a bit combative grabbing staff's arm. Writer and the 2 other staff exited the room for safety, and code green was called.      Patient again when more staff entered her room she stood up on her mattress. Staff assisted patient down and placed on her stomach. Patient was combative and scratched one of the psych. Associates. IM Zyprexa was given. Staff exited her room and patient has been sitting on her bed since then.

## 2021-05-23 NOTE — PLAN OF CARE
"Patient has spent the majority of the shift in her room. Patient ate 50% of her dinner and is drinking fluids. Patient reports having chronic thoughts of SI with no plan. Reports having auditory hallucinations. Reports being anxious and depressed. Poor eye contact and speech is low, difficult to hear her at times. Affect is flat.     Patient evaluation continues. Assessed mood,anxiety,thoughts and behavior.     Patient gradually progressing towards goals.    Patient is encouraged to participate in groups and assisted to develop healthy coping skills.     VS reviewed: BP 95/68   Pulse 80   Temp 98.1  F (36.7  C) (Oral)   Resp 16   Ht 1.651 m (5' 5\")   Wt 118.5 kg (261 lb 4.8 oz)   SpO2 98%   BMI 43.48 kg/m      Length of stay: 0    Refer to daily team meeting notes for individualized plan of care. Nursing will continue to assess.    "

## 2021-05-24 PROCEDURE — 124N000002 HC R&B MH UMMC

## 2021-05-24 PROCEDURE — 99232 SBSQ HOSP IP/OBS MODERATE 35: CPT | Performed by: NURSE PRACTITIONER

## 2021-05-24 PROCEDURE — 250N000013 HC RX MED GY IP 250 OP 250 PS 637: Performed by: NURSE PRACTITIONER

## 2021-05-24 RX ADMIN — RAMELTEON 8 MG: 8 TABLET ORAL at 21:24

## 2021-05-24 RX ADMIN — PROTRIPTYLINE HYDROCHLORIDE 10 MG: 10 TABLET ORAL at 21:24

## 2021-05-24 RX ADMIN — Medication 2 SPRAY: at 09:05

## 2021-05-24 RX ADMIN — VENLAFAXINE HYDROCHLORIDE 150 MG: 150 CAPSULE, EXTENDED RELEASE ORAL at 09:01

## 2021-05-24 RX ADMIN — CLONAZEPAM 0.5 MG: 0.5 TABLET ORAL at 21:24

## 2021-05-24 RX ADMIN — ROPINIROLE HYDROCHLORIDE 1 MG: 1 TABLET, FILM COATED ORAL at 21:23

## 2021-05-24 RX ADMIN — LAMOTRIGINE 25 MG: 25 TABLET ORAL at 09:01

## 2021-05-24 RX ADMIN — QUETIAPINE FUMARATE 150 MG: 50 TABLET, EXTENDED RELEASE ORAL at 21:23

## 2021-05-24 RX ADMIN — Medication 2 SPRAY: at 21:23

## 2021-05-24 RX ADMIN — QUETIAPINE FUMARATE 150 MG: 50 TABLET, EXTENDED RELEASE ORAL at 09:00

## 2021-05-24 RX ADMIN — BUPROPION HYDROCHLORIDE 300 MG: 300 TABLET, EXTENDED RELEASE ORAL at 09:01

## 2021-05-24 NOTE — PLAN OF CARE
Pt spent the entire shift in her room napping.  Pt ate her meals and took all her prescribed medication. Unable to assess for SI.  Will continue to assess.

## 2021-05-24 NOTE — PLAN OF CARE
"Patient has spent majority of the evening in the milieu with her peers watching movies.  Keeps mostly to herself. Reports having chronic thoughts of SI but would not elaborate if she had any plan. Reports having auditory hallucinations. Reports being depressed and anxious. Showered and ate dinner. Affect is flat and guarded. Poor eye contact and appears distracted.    Patient evaluation continues. Assessed mood,anxiety,thoughts and behavior.     Patient gradually progressing towards goals.    Patient is encouraged to participate in groups and assisted to develop healthy coping skills.     VS reviewed: /75   Pulse 111   Temp 98  F (36.7  C) (Oral)   Resp 16   Ht 1.651 m (5' 5\")   Wt 118.5 kg (261 lb 4.8 oz)   SpO2 93%   BMI 43.48 kg/m      Length of stay: 1    Refer to daily team meeting notes for individualized plan of care. Nursing will continue to assess.    "

## 2021-05-24 NOTE — PLAN OF CARE
Assessment/Intervention/Current Symtoms and Care Coordination      Pt is on a  72 hour hold.   I asked [facility] to add this to the team sheet.    She must be on a provisional discharge.  Left Vm for [facility]  - Samreen Machuca ([phone] / fax [phone])  I will ask if they are going to revoke the PD.  She left me a vm and I left her a 2nd one.        Printed off documents from [employer]. Placed in chart.    Order for recommitment dated May 26, 2020    [facility] order dated February 16, 2021    Provisional Discharge dated April 20, 2021          I placed call to Probate court to get the most recent commitment order.  There is a new commitment order and a new [facility] order that they are sending over.  I did receive these:  Order for Recommitment dated May 21, 2021  [facility] Order dated May 21, 2021  I placed a copy in the chart and sent a copy for scanning.      I called  [facility] to connect and coordinate  and will need to call back later due to staff availability. Pt is on the 3rd floor - [phone] or [phone].  [address] [place] [address]  Phone: [phone]  The Health Unit Coordinator has faxed these discharge instructions to Fax:    [phone]                                                                        Wiley uses [facility] in [place] @[phone]    I called [facility] again at 11:50AM ant they want me to talk to the  who was at lunch. I left my # for a call back.  El@[phone], the  on the 3rd floor called back She wants updated clinical and I said I would call her back after provider sees her.  I consulted with the provider and called her back to report that the provider said the pt was brightened at the thorugh of returning to [facility] and would be her until later in the week as we restart all her medications. Carlos lobo this would work well for them and her bed was not in jeopardy.    Discharge Plan or  Goal        Return to [facility]        Barriers to Discharge     Pt needs to stabilize      Referral Status      Completed    Legal Status      Voluntary due to being a provisional discharge but was recommitted on May 21, 2021.

## 2021-05-24 NOTE — PLAN OF CARE
Problem: Sleep Disturbance  Goal: Adequate Sleep/Rest  Outcome: Improving   Night Shift Summary (5/23/21 into 05/24/21)    Pt in bed sleeping at start of shift, breathing quiet and unlabored. Pt appeared to have slept for 7 hrs with no prn meds given.    Pt continues on suicide and and SIB precautions, with no related events occurring this shift.     Will continue to monitor and assess.
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
## 2021-05-24 NOTE — PROGRESS NOTES
"[facility],  Psychiatric Progress Note      Impression:     Misty Parham is a 35-year-old female admitted to [facility] on 5/22/2021.  She was admitted on a 72-hour hold and ongoing commitment MI with [facility] due to depressive symptoms, suicidal ideation and auditory hallucinations commanding her to commit suicide.  She was discharged from [facility] to [facility] on 4/20/2021.  Provider during that hospitalization had filed a petition for commitment MI with [facility] in [facility] for recommitment at the request of her outpatient .  Her original commitment was through 5/26/2021.  The patient went to court on 5/18/2021 and reports the outcome has not yet been determined, though per the DEC assessment she was recommitted through 5/25/2022.  She said that her experience at [facility] has been \"okay.\"  Stressors include the fact that her 14-year-old son will be going to [facility] with family for 1 year on 5/24.  She stopped taking medications about a week prior to admission, explaining that \"I could get up in the morning\" to take them.  She [place]  with the intention of being hit by a truck. Since admission, Rozerem and Protriptyline were restarted.  Wellbutrin XL and Effexor ER were restarted at lower doses.  A Lamictal titration was restarted.  Seroquel XR was restarted at a lower dose with a back up of IM Zyprexa available per [facility].  PRN Klonopin was continued.  PRNs of Zyprexa, Hydroxyzine and Trazodone were initiated.  She initially refused her medications and received 1 dose of IM Zyprexa per [facility].  She has been taking medications as prescribed since then.  She reports ongoing suicidal ideation and contracts for safety on the unit.  She denies SIB urges.  She reports reduced volume of auditory hallucinations of Satan's voice commanding her to commit suicide.  She has been " spending much of her time in her room.           Diagnoses:     Borderline personality disorder  Major depressive disorder, recurrent, severe with psychotic features  Generalized anxiety disorder         Plan:     Medications:  [facility] medications include Zyprexa, Seroquel, Latuda and Abilify.  Continue Requip 1 mg at HS.  Continue Protriptyline 10 mg at HS.  Continue Rozerem 8 mg at HS.  Continue Wellbutrin XL at 300 mg daily (PTA dose 450 mg)  Continue Lamictal titration.  Continue Effexor XR at 150 mg daily (PTA dose 225 mg).  Continue Seroquel XR at 150 mg BID (PTA dose 300 mg BID) with a back up of IM Zyprexa 10 mg available per [facility].  Continue PRNs of Klonopin, Zyprexa, Trazodone and Hydroxyzine.       She is under commitment MI with [facility] in [facility] until 5/25/2022.  Recommitment was filed during her 4/2021 hospitalization and was completed on an outpatient basis.  [facility]  does not intend to revoke unless she requests discharge prior to being stable and having a follow up plan of care in place. Likely plan to discharge to [facility] when stable.  She has outpatient providers.    Transfer care to Dr. Mcfadden.        Attestation:  Patient has been seen and evaluated by me, WILLIAN Sánchez CNP  The patient was counseled on nature of illness and treatment plan/options  Care was coordinated with treatment team         Clinical Global Impressions  First:  Considering your total clinical experience with this particular patient population, how severe are the patient's symptoms at this time?: 7 (05/22/21 1251)  Compared to the patient's condition at the START of treatment, this patient's condition is: 4 (05/22/21 1251)  Most recent:  Considering your total clinical experience with this particular patient population, how severe are the patient's symptoms at this time?: 7 (05/22/21 1251)  Compared to the patient's condition at the START of treatment, this patient's condition is: 4  "(05/22/21 9320)            Interim History:     The patient's care was discussed with the treatment team and chart notes were reviewed.  Pt was documented as sleeping 3.5 and 7 hours during the weekend overnight shifts.  She refused medications 5/22.  She received IM Zyprexa per [facility] and grabbed and scratched staff members during administration.  She has been taking medications as prescribed since then.  She has been spending much of her time in her room.  She occasionally watches TV in the evenings.  She says she does not attend groups due to social anxiety.  She reports difficulty sleeping but has been napping during the day.  Her mood is \"alright.\"  She reports suicidal ideation.  \"I always have plans, but there's nothing I can do here.  She denies urges to engage in self-injury.  She reports auditory hallucinations of Satan's voice commanding her to commit suicide.  She endorses these consistently at baseline, though at baseline the volume is lower.  No evidence of paranoid/delusional thought content.  She reports feeling anxious about her son's travels to [facility] today.  She reports that she would like to return to [facility].           Medications:     Current Facility-Administered Medications   Medication     acetaminophen (TYLENOL) tablet 650 mg     alum & mag hydroxide-simethicone (MAALOX) suspension 30 mL     artificial saliva (BIOTENE MT) solution 2 spray     buPROPion (WELLBUTRIN XL) 24 hr tablet 300 mg     clonazePAM (klonoPIN) tablet 0.5 mg     hydrOXYzine (ATARAX) tablet 25 mg     lamoTRIgine (LaMICtal) tablet 25 mg     OLANZapine (zyPREXA) tablet 10 mg    Or     OLANZapine (zyPREXA) injection 10 mg     QUEtiapine (SEROquel XR) 24 hr tablet 150 mg    Or     OLANZapine (zyPREXA) injection 10 mg     polyethylene glycol (MIRALAX) powder 17 g     protriptyline (VIVACTIL) tablet 10 mg     ramelteon (ROZEREM) tablet 8 mg     rOPINIRole (REQUIP) tablet 1 mg     traZODone (DESYREL) tablet 50 mg     " "venlafaxine (EFFEXOR-XR) 24 hr capsule 150 mg             Allergies:   No Known Allergies         Psychiatric Examination:     /75   Pulse 111   Temp 98  F (36.7  C) (Oral)   Resp 16   Ht 1.651 m (5' 5\")   Wt 118.5 kg (261 lb 4.8 oz)   SpO2 93%   BMI 43.48 kg/m      Appearance:  alert, fair grooming/hygiene   Attitude:  somewhat guarded  Eye Contact:  minimal  Mood: \"alright\"  Affect:  intensity is blunted  Speech:  quiet, soft  Language: fluent and intact in English  Psychomotor, Gait, Musculoskeletal:  no evidence of tardive dyskinesia, dystonia, or tics  Thought Process:  goal oriented, linear, less than logical  Associations:  no loose associations  Thought Content:  denies homicidal ideation, denies visual hallucinations, reports auditory hallucinations of Channing's voice commanding her to commit suicide (but states she would not act upon these) and making derogatory comments, reports suicidal ideation, would not disclose plan but states she has no access to means on the unit and contracts for safety on the unit, denies urges to engage in self-injury, no evidence of paranoid/delusional thought content  Insight:  limited  Judgement:  limited  Oriented to:  month/year, time, person, and place   Attention Span and Concentration:  some impairment  Recent and Remote Memory:  intact  Fund of Knowledge:  appropriate          Labs:     No results found for this or any previous visit (from the past 24 hour(s)).  "

## 2021-05-25 PROCEDURE — 99232 SBSQ HOSP IP/OBS MODERATE 35: CPT | Performed by: PSYCHIATRY & NEUROLOGY

## 2021-05-25 PROCEDURE — 124N000002 HC R&B MH UMMC

## 2021-05-25 PROCEDURE — 250N000013 HC RX MED GY IP 250 OP 250 PS 637: Performed by: NURSE PRACTITIONER

## 2021-05-25 PROCEDURE — 250N000011 HC RX IP 250 OP 636: Performed by: PSYCHIATRY & NEUROLOGY

## 2021-05-25 RX ORDER — QUETIAPINE FUMARATE 50 MG/1
150 TABLET, EXTENDED RELEASE ORAL 2 TIMES DAILY
Status: DISCONTINUED | OUTPATIENT
Start: 2021-05-25 | End: 2021-05-27

## 2021-05-25 RX ORDER — BUPROPION HYDROCHLORIDE 300 MG/1
300 TABLET ORAL DAILY
Status: DISCONTINUED | OUTPATIENT
Start: 2021-05-26 | End: 2021-05-27

## 2021-05-25 RX ORDER — VENLAFAXINE HYDROCHLORIDE 150 MG/1
150 CAPSULE, EXTENDED RELEASE ORAL
Status: DISCONTINUED | OUTPATIENT
Start: 2021-05-26 | End: 2021-05-27

## 2021-05-25 RX ORDER — SALIVA STIMULANT COMB. NO.3
2 SPRAY, NON-AEROSOL (ML) MUCOUS MEMBRANE 2 TIMES DAILY
Status: DISCONTINUED | OUTPATIENT
Start: 2021-05-25 | End: 2021-05-28 | Stop reason: HOSPADM

## 2021-05-25 RX ORDER — OLANZAPINE 10 MG/2ML
10 INJECTION, POWDER, FOR SOLUTION INTRAMUSCULAR 2 TIMES DAILY
Status: DISCONTINUED | OUTPATIENT
Start: 2021-05-25 | End: 2021-05-27

## 2021-05-25 RX ORDER — POLYETHYLENE GLYCOL 3350 17 G/17G
17 POWDER, FOR SOLUTION ORAL DAILY
Status: DISCONTINUED | OUTPATIENT
Start: 2021-05-26 | End: 2021-05-28 | Stop reason: HOSPADM

## 2021-05-25 RX ORDER — LAMOTRIGINE 25 MG/1
25 TABLET ORAL DAILY
Status: DISCONTINUED | OUTPATIENT
Start: 2021-05-26 | End: 2021-05-28 | Stop reason: HOSPADM

## 2021-05-25 RX ADMIN — OLANZAPINE 10 MG: 10 INJECTION, POWDER, LYOPHILIZED, FOR SOLUTION INTRAMUSCULAR at 21:53

## 2021-05-25 RX ADMIN — LAMOTRIGINE 25 MG: 25 TABLET ORAL at 09:18

## 2021-05-25 RX ADMIN — POLYETHYLENE GLYCOL 3350 17 G: 17 POWDER, FOR SOLUTION ORAL at 10:48

## 2021-05-25 RX ADMIN — VENLAFAXINE HYDROCHLORIDE 150 MG: 150 CAPSULE, EXTENDED RELEASE ORAL at 09:18

## 2021-05-25 RX ADMIN — Medication 2 SPRAY: at 09:18

## 2021-05-25 RX ADMIN — BUPROPION HYDROCHLORIDE 300 MG: 300 TABLET, EXTENDED RELEASE ORAL at 09:18

## 2021-05-25 RX ADMIN — QUETIAPINE FUMARATE 150 MG: 50 TABLET, EXTENDED RELEASE ORAL at 09:17

## 2021-05-25 ASSESSMENT — ACTIVITIES OF DAILY LIVING (ADL)
HYGIENE/GROOMING: INDEPENDENT;PROMPTS
ORAL_HYGIENE: INDEPENDENT
DRESS: INDEPENDENT
ORAL_HYGIENE: INDEPENDENT
LAUNDRY: WITH SUPERVISION
DRESS: INDEPENDENT
LAUNDRY: WITH SUPERVISION
LAUNDRY: WITH SUPERVISION
DRESS: SCRUBS (BEHAVIORAL HEALTH);INDEPENDENT
HYGIENE/GROOMING: INDEPENDENT
ORAL_HYGIENE: INDEPENDENT
HYGIENE/GROOMING: INDEPENDENT

## 2021-05-25 NOTE — PLAN OF CARE
"  Problem: Suicidal Behavior  Goal: Suicidal Behavior is Absent or Managed  Outcome: Improving  Flowsheets (Taken 5/25/2021 1216)  Mutually Determined Action Steps (Suicidal Behavior Absent/Managed):   shares suicidal thoughts   verbalizes safety check rationale    RN Assessment:  SI/Self harm:  pt reports chronic thoughts, though denies plans or intent to harm self and contracts for safety while at the hospital  Aggression/agitation/HI:  none  AVH:  auditory hallucinations that \"don't say anything nice\". Pt reports she gela by keeping herself occupied.   Sleep: denies concerns  PRN Med: No PRNs administered this shift  Medication AE: none reported or observed  Physical Complaints/Issues: none reported or observed  I & O: eating and drinking well   ADLs: independent  Vitals:  low, see flow sheet. Writer encouraged pt to consume fluids, change positions slowly, and report any dizziness/light-headedness to staff. Pt expressed understanding and agreement.  COVID 19 Assessment:  pt tested negative, shows no s/s of COVID19 infection  Milieu Participation: minimal, pt keeps to self in her room though does present for meals and care. Able to get needs met.  Behavior:calm, soft-spoken, pleasant and polite. Pt is quite depressed and anxious.     No other concerns at this time. Nursing will continue to monitor and assess.       "

## 2021-05-25 NOTE — PLAN OF CARE
"Patient has spent majority of the shift in the milieu. Keeps mostly to herself on the periphery. Patient denies suicidal ideation and self injurious thoughts. Reports having auditory hallucinations. Reports having anxiety and depression. Requested prn klonopin. Med compliant. Affect is flat and guarded. Pleasant and cooperative on the unit.     Patient evaluation continues. Assessed mood,anxiety,thoughts and behavior.     Patient gradually progressing towards goals.    Patient is encouraged to participate in groups and assisted to develop healthy coping skills.     VS reviewed: /70   Pulse 69   Temp 98  F (36.7  C) (Oral)   Resp 16   Ht 1.651 m (5' 5\")   Wt 118.5 kg (261 lb 4.8 oz)   SpO2 94%   BMI 43.48 kg/m      Length of stay: 2    Refer to daily team meeting notes for individualized plan of care. Nursing will continue to assess.    "

## 2021-05-25 NOTE — PROGRESS NOTES
"[facility], [place]   Psychiatric Progress Note  Hospital Day: 3        Interim History:   The patient's care was discussed with the treatment team during the daily team meeting and/or staff's chart notes were reviewed.  Staff report patient has been visible in the milieu but keeps to self, denying SI, reporting AH, received PRN clonazepam, taking other medications as prescribed, no acute events overnight.     Upon interview, the patient remembers meeting writer on previous admission. Reports she is still having some depression and voices telling her to harm herself, reports able to challenge voices, denies having plan/intent to harm self on unit. Tolerating medications, denies side effects. She is hoping to return to [facility] and asked if she could return by the end of the week, discussed evaluating how SI and AH improve with restarting medications and take it day by day. She is agreeable to signing in as a voluntary patient. Discussed moving her morning medications to noon as getting up early appears to be barrier to adherence outside of the hospital, she was agreeable to trying this. No additional concerns.          Medications:       artificial saliva  2 spray Swish & Spit BID     buPROPion  300 mg Oral Daily     lamoTRIgine  25 mg Oral Daily     QUEtiapine  150 mg Oral BID    Or     OLANZapine  10 mg Intramuscular BID     polyethylene glycol  17 g Oral Daily     protriptyline  10 mg Oral At Bedtime     ramelteon  8 mg Oral At Bedtime     rOPINIRole  1 mg Oral At Bedtime     venlafaxine  150 mg Oral Daily with breakfast          Allergies:   No Known Allergies       Labs:   No results found for this or any previous visit (from the past 48 hour(s)).       Psychiatric Examination:     /70   Pulse 69   Temp 98  F (36.7  C) (Oral)   Resp 16   Ht 1.651 m (5' 5\")   Wt 118.5 kg (261 lb 4.8 oz)   SpO2 94%   BMI 43.48 kg/m    Weight is 261 lbs 4.8 oz  Body mass index is " 43.48 kg/m .    Orthostatic Vitals       Most Recent      Lying Orthostatic BP 91/62 05/25 1047    Lying Orthostatic Pulse (bpm) 83 05/25 1047        Appearance: awake, alert, dressed in hospital scrubs and untidy  Attitude:  cooperative and a bit guarded  Eye Contact:  fair  Mood:  anxious and depressed  Affect:  mood congruent and intensity is blunted  Speech:  clear, coherent and minimal, soft volume  Language: fluent and intact in English  Psychomotor, Gait, Musculoskeletal:  no evidence of tardive dyskinesia, dystonia, or tics  Thought Process:  goal oriented  Associations:  no loose associations  Thought Content:  reports passive SI, intermittent CAH, denies VH, denies HI  Insight:  limited  Judgement:  limited  Oriented to:  time, person, and place  Attention Span and Concentration:  fair  Recent and Remote Memory:  fair  Fund of Knowledge:  appropriate    Clinical Global Impressions  First:  Considering your total clinical experience with this particular patient population, how severe are the patient's symptoms at this time?: 7 (05/22/21 1251)  Compared to the patient's condition at the START of treatment, this patient's condition is: 4 (05/22/21 1251)  Most recent:  Considering your total clinical experience with this particular patient population, how severe are the patient's symptoms at this time?: 7 (05/22/21 1251)  Compared to the patient's condition at the START of treatment, this patient's condition is: 4 (05/22/21 1251)           Precautions:     Behavioral Orders   Procedures     Code 1 - Restrict to Unit     Discontinue 1:1 attendant for suicide risk     Order Specific Question:   I have performed an in person assessment of the patient     Answer:   Based on this assessment the patient no longer requires a one on one attendant at this point in time.     Order Specific Question:   Rationale     Answer:   Patient States able to remain safe in hospital     Routine Programming     As clinically  "indicated     Self Injury Precaution     Status 15     Every 15 minutes.     Suicide precautions     Patients on Suicide Precautions should have a Combination Diet ordered that includes a Diet selection(s) AND a Behavioral Tray selection for Safe Tray - with utensils, or Safe Tray - NO utensils            Diagnoses:      Borderline personality disorder  Major depressive disorder, recurrent, severe with psychotic features  Generalized anxiety disorder  Suicidal ideation  Restless legs         Assessment & Plan:   Assessment and hospital summary:  Misty Parham is a 35-year-old female admitted to [address] on 5/22/2021.  She was admitted on a 72-hour hold and ongoing commitment MI with [facility] due to depressive symptoms, suicidal ideation and auditory hallucinations commanding her to commit suicide.  She was discharged from [facility] to [facility] on 4/20/2021.  Provider during that hospitalization had filed a petition for commitment MI with [facility] in [facility] for recommitment at the request of her outpatient .  Her original commitment was through 5/26/2021.  The patient went to court on 5/18/2021 and reports the outcome has not yet been determined, though per the DEC assessment she was recommitted through 5/25/2022.  She said that her experience at [facility] has been \"okay.\"  Stressors include the fact that her 14-year-old son will be going to [facility] with family for 1 year on 5/24.  She stopped taking medications about a week prior to admission, explaining that \"I could get up in the morning\" to take them.  She tried climbing a fence [place] [address] with the intention of being hit by a truck. Since admission, Rozerem and Protriptyline were restarted.  Wellbutrin XL and Effexor ER were restarted at lower doses.  A Lamictal titration was restarted.  Seroquel XR was restarted at a lower dose with a back up of IM Zyprexa available per [facility].  PRN " Klonopin was continued.  PRNs of Zyprexa, Hydroxyzine and Trazodone were initiated.  She initially refused her medications and received 1 dose of IM Zyprexa per [facility].  She has been taking medications as prescribed since then.  She reports ongoing suicidal ideation and contracts for safety on the unit.  She denies SIB urges.  She reports reduced volume of auditory hallucinations of Satan's voice commanding her to commit suicide.  She has been spending much of her time in her room.      Psychiatric treatment/inteventions:  Medications:   -Continue Requip 1 mg at HS for restless legs  -Continue Protriptyline 10 mg at HS  -Continue Rozerem 8 mg at HS  -Continue Wellbutrin XL at 300 mg daily (PTA dose 450 mg), will plan to increase as tolerated  -Continue Lamictal titration at 25mg daily  -Continue Effexor XR at 150 mg daily (PTA dose 225 mg)  -Continue Seroquel XR at 150 mg BID (PTA dose 300 mg BID) with a back up of IM Zyprexa 10 mg available per [facility]av, will plan to increase as tolerated  -Continue PRNs of Klonopin, Zyprexa, Trazodone and Hydroxyzine.     Laboratory/Imaging: no new labs ordered     Patient will be treated in therapeutic milieu with appropriate individual and group therapies as described.     Medical treatment/interventions:  Medical concerns: pt denies acute medical concerns, continue to monitor     This note was created by undersigned using a Dragon dictation system. All typing errors or contextual distortion are unintentional and software inherent.     Disposition Plan   Reason for ongoing admission: poses an imminent risk to self and is unable to care for self due to severe psychosis or jey  Discharge location: back to [facility] in 3-5 days  Discharge Medications: not ordered  Follow-up Appointments: not scheduled  Legal Status: voluntary (On commitment with [facility], PD not revoked)  Entered by: Shari Mcfadden on 5/25/2021 at 8:38 AM

## 2021-05-25 NOTE — PLAN OF CARE
Pt appears to have slept only 4.5 hours, pt awake at the beginning of the shift in her room listening to headphones and reading. Pt was offered PRN medications but declined, presents as guarded.    Pt continues on suicide and SIB precautions.     No concerns noted or reported at this time, will continue to monitor and support plan of care.

## 2021-05-25 NOTE — PLAN OF CARE
Assessment/Intervention/Current Symtoms and Care Coordination  -Refer to psychosocial  for assessment/social functioning  -Chart review  -Pre round meeting with team  -Rounded with team, addressed patient needs/concerns  -Post round meeting with team      Pt is not visible to me on the unit.  I am told that the RN was having the pt sign in voluntarily. Evening shift RN confirmed.      Discharge Plan or Goal  Pending stabilization & development of a safe discharge plan.  Considerations include: return to [facility] and care.    Barriers to Discharge  Patient requires further psychiatric stabilization due to current symptomology    Referral Status  established    Legal Status  Patient is voluntary  She remains on her current provisional discharge.

## 2021-05-26 LAB
AMPHETAMINES UR QL SCN>1000 NG/ML: NEGATIVE
BARBITURATES UR QL SCN: NEGATIVE
BENZODIAZ UR QL SCN: NEGATIVE
BZE UR QL SCN>300 NG/ML: NEGATIVE
CARBOXYTHC UR QL SCN: NEGATIVE
CREAT UR-MCNC: 32 MG/DL
ETHANOL UR QL: NEGATIVE
HCG UR QL: NEGATIVE
METHADONE UR QL SCN: NEGATIVE
OPIATES UR QL SCN: NEGATIVE
OXYCODONE UR QL SCN: NEGATIVE
PCP CTO UR SCN-MCNC: NEGATIVE NG/ML

## 2021-05-26 PROCEDURE — 250N000013 HC RX MED GY IP 250 OP 250 PS 637: Performed by: PSYCHIATRY & NEUROLOGY

## 2021-05-26 PROCEDURE — 124N000002 HC R&B MH UMMC

## 2021-05-26 PROCEDURE — 250N000013 HC RX MED GY IP 250 OP 250 PS 637: Performed by: NURSE PRACTITIONER

## 2021-05-26 PROCEDURE — 99232 SBSQ HOSP IP/OBS MODERATE 35: CPT | Performed by: PSYCHIATRY & NEUROLOGY

## 2021-05-26 PROCEDURE — 80307 DRUG TEST PRSMV CHEM ANLYZR: CPT | Performed by: EMERGENCY MEDICINE

## 2021-05-26 PROCEDURE — 81025 URINE PREGNANCY TEST: CPT | Performed by: EMERGENCY MEDICINE

## 2021-05-26 RX ADMIN — ROPINIROLE HYDROCHLORIDE 1 MG: 1 TABLET, FILM COATED ORAL at 21:17

## 2021-05-26 RX ADMIN — LAMOTRIGINE 25 MG: 25 TABLET ORAL at 12:56

## 2021-05-26 RX ADMIN — CLONAZEPAM 0.5 MG: 0.5 TABLET ORAL at 20:15

## 2021-05-26 RX ADMIN — QUETIAPINE FUMARATE 150 MG: 50 TABLET, EXTENDED RELEASE ORAL at 21:17

## 2021-05-26 RX ADMIN — PROTRIPTYLINE HYDROCHLORIDE 10 MG: 10 TABLET ORAL at 21:17

## 2021-05-26 RX ADMIN — BUPROPION HYDROCHLORIDE 300 MG: 300 TABLET, EXTENDED RELEASE ORAL at 12:56

## 2021-05-26 RX ADMIN — QUETIAPINE FUMARATE 150 MG: 50 TABLET, EXTENDED RELEASE ORAL at 12:56

## 2021-05-26 RX ADMIN — Medication 2 SPRAY: at 21:17

## 2021-05-26 RX ADMIN — RAMELTEON 8 MG: 8 TABLET ORAL at 21:17

## 2021-05-26 RX ADMIN — VENLAFAXINE HYDROCHLORIDE 150 MG: 150 CAPSULE, EXTENDED RELEASE ORAL at 12:56

## 2021-05-26 RX ADMIN — POLYETHYLENE GLYCOL 3350 17 G: 17 POWDER, FOR SOLUTION ORAL at 12:56

## 2021-05-26 RX ADMIN — CLONAZEPAM 0.5 MG: 0.5 TABLET ORAL at 09:01

## 2021-05-26 RX ADMIN — Medication 2 SPRAY: at 12:56

## 2021-05-26 ASSESSMENT — ACTIVITIES OF DAILY LIVING (ADL)
DRESS: INDEPENDENT
ORAL_HYGIENE: INDEPENDENT
DRESS: SCRUBS (BEHAVIORAL HEALTH);INDEPENDENT
LAUNDRY: WITH SUPERVISION
ORAL_HYGIENE: INDEPENDENT
HYGIENE/GROOMING: INDEPENDENT
LAUNDRY: WITH SUPERVISION
HYGIENE/GROOMING: INDEPENDENT

## 2021-05-26 NOTE — PROGRESS NOTES
"[facility], [place]   Psychiatric Progress Note  Hospital Day: 4        Interim History:   The patient's care was discussed with the treatment team during the daily team meeting and/or staff's chart notes were reviewed.  Staff report patient isolated in room, declined to take PO medications and received IM per Bhargav, reported she was talking to pranay mejia called to administer IM medication, no other acute events overnight.     Upon interview, pt reports that her mood is \"okay\" today, she was having increased depression and SI last night when she was \"talking to satan\", reports she is hearing [employer] voice intermittently today. Denies AVH during interview. Denies SI with plan/intent on unit, reports she chronically thinks about SI and \"always has plans\". She is going to try and take her medications later today and encouraged her to spend more time out of room to help with distractions. She continues to be interested in returning to [facility] and discussed she would need to show medication adherence prior to discharge. No additional concerns.          Medications:       artificial saliva  2 spray Swish & Spit BID     buPROPion  300 mg Oral Daily     lamoTRIgine  25 mg Oral Daily     QUEtiapine  150 mg Oral BID    Or     OLANZapine  10 mg Intramuscular BID     polyethylene glycol  17 g Oral Daily     protriptyline  10 mg Oral At Bedtime     ramelteon  8 mg Oral At Bedtime     rOPINIRole  1 mg Oral At Bedtime     venlafaxine  150 mg Oral Daily with lunch          Allergies:   No Known Allergies       Labs:   No results found for this or any previous visit (from the past 48 hour(s)).       Psychiatric Examination:     /80   Pulse 85   Temp 97.5  F (36.4  C) (Oral)   Resp 16   Ht 1.651 m (5' 5\")   Wt 118.5 kg (261 lb 4.8 oz)   SpO2 97%   BMI 43.48 kg/m    Weight is 261 lbs 4.8 oz  Body mass index is 43.48 kg/m .    Orthostatic Vitals       Most Recent      Lying " Orthostatic BP 91/62 05/25 1047    Lying Orthostatic Pulse (bpm) 83 05/25 1047        Appearance: awake, alert, dressed in hospital scrubs and improved grooming  Attitude:  cooperative and a bit guarded  Eye Contact:  fair  Mood:  anxious and depressed  Affect:  mood congruent and intensity is blunted  Speech:  clear, coherent and minimal, soft volume  Language: fluent and intact in English  Psychomotor, Gait, Musculoskeletal:  no evidence of tardive dyskinesia, dystonia, or tics  Thought Process:  goal oriented  Associations:  no loose associations  Thought Content:  reports passive SI, intermittent CAH, denies VH, denies HI  Insight:  limited  Judgement:  limited  Oriented to:  time, person, and place  Attention Span and Concentration:  fair  Recent and Remote Memory:  fair  Fund of Knowledge:  appropriate    Clinical Global Impressions  First:  Considering your total clinical experience with this particular patient population, how severe are the patient's symptoms at this time?: 7 (05/22/21 1251)  Compared to the patient's condition at the START of treatment, this patient's condition is: 4 (05/22/21 1251)  Most recent:  Considering your total clinical experience with this particular patient population, how severe are the patient's symptoms at this time?: 7 (05/22/21 1251)  Compared to the patient's condition at the START of treatment, this patient's condition is: 4 (05/22/21 1251)           Precautions:     Behavioral Orders   Procedures     Code 1 - Restrict to Unit     Discontinue 1:1 attendant for suicide risk     Order Specific Question:   I have performed an in person assessment of the patient     Answer:   Based on this assessment the patient no longer requires a one on one attendant at this point in time.     Order Specific Question:   Rationale     Answer:   Patient States able to remain safe in hospital     Routine Programming     As clinically indicated     Self Injury Precaution     Status 15     Every  "15 minutes.     Suicide precautions     Patients on Suicide Precautions should have a Combination Diet ordered that includes a Diet selection(s) AND a Behavioral Tray selection for Safe Tray - with utensils, or Safe Tray - NO utensils            Diagnoses:      Borderline personality disorder  Major depressive disorder, recurrent, severe with psychotic features  Generalized anxiety disorder  Suicidal ideation  Restless legs         Assessment & Plan:   Assessment and hospital summary:  Misty Parham is a 35-year-old female admitted to [address] on 5/22/2021.  She was admitted on a 72-hour hold and ongoing commitment MI with Bhargav due to depressive symptoms, suicidal ideation and auditory hallucinations commanding her to commit suicide.  She was discharged from [facility] to [facility] on 4/20/2021.  Provider during that hospitalization had filed a petition for commitment MI with Bhargav in [facility] for recommitment at the request of her outpatient .  Her original commitment was through 5/26/2021.  The patient went to court on 5/18/2021 and reports the outcome has not yet been determined, though per the DEC assessment she was recommitted through 5/25/2022.  She said that her experience at [facility] has been \"okay.\"  Stressors include the fact that her 14-year-old son will be going to [facility] with family for 1 year on 5/24.  She stopped taking medications about a week prior to admission, explaining that \"I could get up in the morning\" to take them.  She tried climbing a fence [place] [address] with the intention of being hit by a truck. Since admission, Rozerem and Protriptyline were restarted.  Wellbutrin XL and Effexor ER were restarted at lower doses.  A Lamictal titration was restarted.  Seroquel XR was restarted at a lower dose with a back up of IM Zyprexa available per Bhargav.  PRN Klonopin was continued.  PRNs of Zyprexa, Hydroxyzine and " Trazodone were initiated.  She initially refused her medications and received 1 dose of IM Zyprexa per [facility].  She has been taking medications as prescribed since then.  She reports ongoing suicidal ideation and contracts for safety on the unit.  She denies SIB urges.  She reports reduced volume of auditory hallucinations of Satan's voice commanding her to commit suicide.  She has been spending much of her time in her room.    Psychiatric treatment/inteventions:  Medications:   -Continue Requip 1 mg at HS for restless legs  -Continue Protriptyline 10 mg at HS  -Continue Rozerem 8 mg at HS  -Continue Wellbutrin XL at 300 mg daily (PTA dose 450 mg), will plan to increase as tolerated and if adherent   -Continue Lamictal titration at 25mg daily  -Continue Effexor XR at 150 mg daily (PTA dose 225 mg)  -Continue Seroquel XR at 150 mg BID (PTA dose 300 mg BID) with a back up of IM Zyprexa 10 mg available per [facility], will plan to increase tomorrow if patient takes dose this evening   -Continue PRNs of Klonopin, Zyprexa, Trazodone and Hydroxyzine.     Laboratory/Imaging: no new labs ordered     Patient will be treated in therapeutic milieu with appropriate individual and group therapies as described.     Medical treatment/interventions:  Medical concerns: pt denies acute medical concerns, continue to monitor     This note was created by undersigned using a Dragon dictation system. All typing errors or contextual distortion are unintentional and software inherent.     Disposition Plan   Reason for ongoing admission: poses an imminent risk to self and is unable to care for self due to severe psychosis or jey  Discharge location: back to [facility] in 3-5 days  Discharge Medications: not ordered  Follow-up Appointments: not scheduled  Legal Status: voluntary (On commitment with [facility], PD not revoked)  Entered by: Shari Mcfadden on 5/26/2021 at 8:02 AM

## 2021-05-26 NOTE — PLAN OF CARE
"Patient has spent the first half of the shift in the milieu, then went to her room and listened to music. When writer attempted to ask patient if she would take her medication she refused. Then proceeded to tell writer that she was talking to jcakie. Code green was called. Patient was again offered her oral medications but refused. IM Zyprexa was administered. Patient was cooperative with staying in her room. Ate dinner. Affect is flat and blunted.    Patient evaluation continues. Assessed mood,anxiety,thoughts and behavior.     Patient gradually progressing towards goals.    Patient is encouraged to participate in groups and assisted to develop healthy coping skills.     VS reviewed: /80   Pulse 85   Temp 97.5  F (36.4  C) (Oral)   Resp 16   Ht 1.651 m (5' 5\")   Wt 118.5 kg (261 lb 4.8 oz)   SpO2 97%   BMI 43.48 kg/m      Length of stay: 3    Refer to daily team meeting notes for individualized plan of care. Nursing will continue to assess.    "

## 2021-05-26 NOTE — PLAN OF CARE
Assessment/Intervention/Current Symtoms and Care Coordination  -Chart review  -Pre round meeting with team  -Rounded with team, addressed patient needs/concerns  -Post round meeting with team  Current Symptoms include the following: Psychosis      Pt was visible on the unit, in the lounge reading a book. THere are no billable groups noted.    Discharge Plan or Goal  Pending stabilization & development of a safe discharge plan.  Considerations include: Return to group home     Barriers to Discharge  Patient requires further psychiatric stabilization due to current symptomology    Referral Status  established  providers    Legal Status  Patient is voluntary

## 2021-05-26 NOTE — PLAN OF CARE
Problem: Adult Inpatient Plan of Care  Goal: Optimal Comfort and Wellbeing  Outcome: No Change  Intervention: Provide Person-Centered Care     Problem: Suicidal Behavior  Goal: Suicidal Behavior is Absent or Managed  Outcome: No Change     Patient is withdrawn, flat, isolative but pleasant and cooperative upon approach.  She is up in the milieu intermittently but keeps to self.  Patient does have auditory hallucinations, she explains that she is talking to satan.  She said that medication has helped with this a little but she still continues to talk to satan today.  She speaks quietly and is guarded.  Patient endorses anxiety rated at 6/10 and depression rated at 8/10.   Patient endorses SI.  She denies suicidal plan while in the hospital but says she has many plans outside of the hospital.  She endorses feeling safe in the hospital.  She denies HI, SIB, and pain.  Patient is medication compliant and contracts for safety.  Will continue to monitor. Giulia Castelan RN

## 2021-05-27 PROCEDURE — 250N000013 HC RX MED GY IP 250 OP 250 PS 637: Performed by: PSYCHIATRY & NEUROLOGY

## 2021-05-27 PROCEDURE — 124N000002 HC R&B MH UMMC

## 2021-05-27 PROCEDURE — 99232 SBSQ HOSP IP/OBS MODERATE 35: CPT | Performed by: PSYCHIATRY & NEUROLOGY

## 2021-05-27 PROCEDURE — 250N000013 HC RX MED GY IP 250 OP 250 PS 637: Performed by: NURSE PRACTITIONER

## 2021-05-27 RX ORDER — QUETIAPINE FUMARATE 50 MG/1
200 TABLET, EXTENDED RELEASE ORAL 2 TIMES DAILY
Status: DISCONTINUED | OUTPATIENT
Start: 2021-05-27 | End: 2021-05-28 | Stop reason: HOSPADM

## 2021-05-27 RX ORDER — OLANZAPINE 10 MG/2ML
10 INJECTION, POWDER, FOR SOLUTION INTRAMUSCULAR 2 TIMES DAILY
Status: DISCONTINUED | OUTPATIENT
Start: 2021-05-27 | End: 2021-05-28 | Stop reason: HOSPADM

## 2021-05-27 RX ADMIN — ROPINIROLE HYDROCHLORIDE 1 MG: 1 TABLET, FILM COATED ORAL at 22:07

## 2021-05-27 RX ADMIN — RAMELTEON 8 MG: 8 TABLET ORAL at 22:07

## 2021-05-27 RX ADMIN — CLONAZEPAM 0.5 MG: 0.5 TABLET ORAL at 20:13

## 2021-05-27 RX ADMIN — QUETIAPINE FUMARATE 200 MG: 50 TABLET, EXTENDED RELEASE ORAL at 22:07

## 2021-05-27 RX ADMIN — QUETIAPINE FUMARATE 150 MG: 50 TABLET, EXTENDED RELEASE ORAL at 12:06

## 2021-05-27 RX ADMIN — HYDROXYZINE HYDROCHLORIDE 25 MG: 25 TABLET, FILM COATED ORAL at 22:09

## 2021-05-27 RX ADMIN — CLONAZEPAM 0.5 MG: 0.5 TABLET ORAL at 12:13

## 2021-05-27 RX ADMIN — Medication 2 SPRAY: at 22:07

## 2021-05-27 RX ADMIN — POLYETHYLENE GLYCOL 3350 17 G: 17 POWDER, FOR SOLUTION ORAL at 12:07

## 2021-05-27 RX ADMIN — PROTRIPTYLINE HYDROCHLORIDE 10 MG: 10 TABLET ORAL at 22:07

## 2021-05-27 RX ADMIN — LAMOTRIGINE 25 MG: 25 TABLET ORAL at 12:07

## 2021-05-27 RX ADMIN — BUPROPION HYDROCHLORIDE 300 MG: 300 TABLET, EXTENDED RELEASE ORAL at 12:06

## 2021-05-27 RX ADMIN — Medication 2 SPRAY: at 12:14

## 2021-05-27 RX ADMIN — VENLAFAXINE HYDROCHLORIDE 150 MG: 150 CAPSULE, EXTENDED RELEASE ORAL at 12:07

## 2021-05-27 ASSESSMENT — ACTIVITIES OF DAILY LIVING (ADL)
HYGIENE/GROOMING: INDEPENDENT
DRESS: INDEPENDENT
ORAL_HYGIENE: INDEPENDENT

## 2021-05-27 NOTE — PROGRESS NOTES
"[facility], [place]   Psychiatric Progress Note  Hospital Day: 5        Interim History:   The patient's care was discussed with the treatment team during the daily team meeting and/or staff's chart notes were reviewed.  Staff report patient was more visible in milieu but keeping to self, taking medications as prescribed, received PRN clonazepam for anxiety around AH and reporting talking to satan, denying SI with plan/intent on unit, no acute events overnight.     Upon interview, patient was just leaving her room to go read in milieu. Reports mood is \"pretty good\" today. Denies SI, reports her AH are \"quieter\" and she is feeling safe on unit. Tolerating medications, feels she will be able to take them without difficulty today and if she returns to [facility]. She would like to return tomorrow and would feel safe returning if able to continue to take medications. Will see how it goes today with adherence to medication and connect with [place]. Reports feeling well physically. No additional concerns.          Medications:       artificial saliva  2 spray Swish & Spit BID     buPROPion  300 mg Oral Daily     lamoTRIgine  25 mg Oral Daily     QUEtiapine  150 mg Oral BID    Or     OLANZapine  10 mg Intramuscular BID     polyethylene glycol  17 g Oral Daily     protriptyline  10 mg Oral At Bedtime     ramelteon  8 mg Oral At Bedtime     rOPINIRole  1 mg Oral At Bedtime     venlafaxine  150 mg Oral Daily with lunch          Allergies:   No Known Allergies       Labs:     Recent Results (from the past 48 hour(s))   Drug Abuse Scr  Ur w Qnt Reflx    Collection Time: 21  9:09 AM   Result Value Ref Range    Alcohol Ethyl Urine Negative     Amphetamine Urine Negative     Barbiturates Urine Negative     Benzodiazepines Urine Negative     Cocaine Metabolite Urine Negative     Opiates Urine Negative     Oxycodone Urine Negative     Methadone Urine Negative     Phencyclidine Urine " "Negative     THC Metabolite Urine Negative     Creatinine Urine Drug Screen 32 >20 mg/dl   HCG qualitative urine    Collection Time: 05/26/21  9:09 AM   Result Value Ref Range    HCG Qual Urine Negative NEG^Negative          Psychiatric Examination:     BP 97/71 (BP Location: Right arm)   Pulse 99   Temp 98.3  F (36.8  C) (Oral)   Resp 16   Ht 1.651 m (5' 5\")   Wt 118.5 kg (261 lb 4.8 oz)   SpO2 94%   BMI 43.48 kg/m    Weight is 261 lbs 4.8 oz  Body mass index is 43.48 kg/m .    Orthostatic Vitals       Most Recent      Sitting Orthostatic /85 05/27 0855    Sitting Orthostatic Pulse (bpm) 110 05/27 0855        Appearance: awake, alert, dressed in hospital scrubs and improved grooming  Attitude:  cooperative and less guarded  Eye Contact:  fair  Mood:  \"pretty good\", improved  Affect:  mood congruent and intensity is blunted but more reactive than admission  Speech:  clear, coherent and minimal, soft volume  Language: fluent and intact in English  Psychomotor, Gait, Musculoskeletal:  no evidence of tardive dyskinesia, dystonia, or tics  Thought Process:  goal oriented  Associations:  no loose associations  Thought Content:  denies SI, reports AH are \"quieter\" and not command in nature, denies VH, denies HI  Insight:  limited  Judgement:  limited  Oriented to:  time, person, and place  Attention Span and Concentration:  fair  Recent and Remote Memory:  fair  Fund of Knowledge:  appropriate    Clinical Global Impressions  First:  Considering your total clinical experience with this particular patient population, how severe are the patient's symptoms at this time?: 7 (05/22/21 1251)  Compared to the patient's condition at the START of treatment, this patient's condition is: 4 (05/22/21 1251)  Most recent:  Considering your total clinical experience with this particular patient population, how severe are the patient's symptoms at this time?: 7 (05/22/21 1251)  Compared to the patient's condition at the START " "of treatment, this patient's condition is: 4 (05/22/21 1251)           Precautions:     Behavioral Orders   Procedures     Code 1 - Restrict to Unit     Discontinue 1:1 attendant for suicide risk     Order Specific Question:   I have performed an in person assessment of the patient     Answer:   Based on this assessment the patient no longer requires a one on one attendant at this point in time.     Order Specific Question:   Rationale     Answer:   Patient States able to remain safe in hospital     Routine Programming     As clinically indicated     Self Injury Precaution     Status 15     Every 15 minutes.     Suicide precautions     Patients on Suicide Precautions should have a Combination Diet ordered that includes a Diet selection(s) AND a Behavioral Tray selection for Safe Tray - with utensils, or Safe Tray - NO utensils            Diagnoses:      Borderline personality disorder  Major depressive disorder, recurrent, severe with psychotic features  Generalized anxiety disorder  Suicidal ideation  Restless legs         Assessment & Plan:   Assessment and hospital summary:  Misty Parham is a 35-year-old female admitted to [address] on 5/22/2021.  She was admitted on a 72-hour hold and ongoing commitment MI with Bhargav due to depressive symptoms, suicidal ideation and auditory hallucinations commanding her to commit suicide.  She was discharged from [facility] to [facility] on 4/20/2021.  Provider during that hospitalization had filed a petition for commitment MI with Bhargav in [facility] for recommitment at the request of her outpatient .  Her original commitment was through 5/26/2021.  The patient went to court on 5/18/2021 and reports the outcome has not yet been determined, though per the DEC assessment she was recommitted through 5/25/2022.  She said that her experience at [facility] has been \"okay.\"  Stressors include the fact that her 14-year-old son " "will be going to [facility] with family for 1 year on 5/24.  She stopped taking medications about a week prior to admission, explaining that \"I could get up in the morning\" to take them.  She tried climbing a fence to gain access to interste  with the intention of being hit by a truck. Since admission, Rozerem and Protriptyline were restarted.  Wellbutrin XL and Effexor ER were restarted at lower doses.  A Lamictal titration was restarted.  Seroquel XR was restarted at a lower dose with a back up of IM Zyprexa available per [facility].  PRN Klonopin was continued.  PRNs of Zyprexa, Hydroxyzine and Trazodone were initiated.  She initially refused her medications and received 1 dose of IM Zyprexa per [facility].  She has been taking medications as prescribed since then.  She reports ongoing suicidal ideation and contracts for safety on the unit.  She denies SIB urges.  She reports reduced volume of auditory hallucinations of Satan's voice commanding her to commit suicide.  She spent first few days in room, has been more visible in milieu recently.     Psychiatric treatment/inteventions:  Medications:   -Continue Requip 1 mg at HS for restless legs  -Continue Protriptyline 10 mg at HS  -Continue Rozerem 8 mg at HS  -Increase Wellbutrin XL back to PTA dose of 450 mg daily  -Continue Lamictal titration at 25mg daily  -Increase Effexor XR back to PTA dose of 225mg  -Increase Seroquel XR to 200 mg BID (PTA dose 300 mg BID) with a back up of IM Zyprexa 10 mg available per [facility], will plan to increase tomorrow if patient takes dose this evening   -Continue PRNs of Klonopin, Zyprexa, Trazodone and Hydroxyzine.     Laboratory/Imaging: no new labs ordered     Patient will be treated in therapeutic milieu with appropriate individual and group therapies as described.     Medical treatment/interventions:  Medical concerns: pt denies acute medical concerns, continue to monitor     This note was created by undersigned using a Dragon " dictation system. All typing errors or contextual distortion are unintentional and software inherent.     Disposition Plan   Reason for ongoing admission: poses an imminent risk to self and is unable to care for self due to severe psychosis or jey  Discharge location: back to [facility] in 1-3 days  Discharge Medications: not ordered  Follow-up Appointments: not scheduled  Legal Status: voluntary (On commitment with [facility], PD not revoked)  Entered by: Shari Mcfadden on 5/27/2021 at 7:32 AM

## 2021-05-27 NOTE — PLAN OF CARE
"Patient has spent parts of the shift in the milieu and other times in her room. Patient denies suicidal ideation and self injurious thoughts. Reports having auditory hallucinations. Continues to say that she is speaking with satan. Reports being depressed and anxious. Requested prn klonopin. Med compliant.       Patient evaluation continues. Assessed mood,anxiety,thoughts and behavior.     Patient gradually progressing towards goals.    Patient is encouraged to participate in groups and assisted to develop healthy coping skills.     VS reviewed: BP 97/71 (BP Location: Right arm)   Pulse 99   Temp 98.3  F (36.8  C) (Oral)   Resp 16   Ht 1.651 m (5' 5\")   Wt 118.5 kg (261 lb 4.8 oz)   SpO2 94%   BMI 43.48 kg/m      Length of stay: 4    Refer to daily team meeting notes for individualized plan of care. Nursing will continue to assess.    "

## 2021-05-27 NOTE — PLAN OF CARE
Assessment/Intervention/Current Symtoms and Care Coordination  -Chart review  -Pre round meeting with team  -Rounded with team, addressed patient needs/concerns  -Post round meeting with team  Current Symptoms include the following: VH      I left a vm for [phone] to discuss pt returning to [facility] tomorrow.      Pt's TCM called for an update.      I had pt sign the [facility] CE form. I saw this future  psychiatry appointment.  08/11/2021 Telemedicine Behavioral Health Colby Karimi MD    [facility] [place] [address]    [phone] (Work)    [phone] (Fax)           Pt did not  the phone for this appointment this week but she was here.  05/24/2021 Telephone [facility] [address] [place] [address] [phone]   Anderson Stubbs, [facility]    [address], [place] [address] [phone] (Work)    [phone] (Fax)   Patient Status Update (Covid 19)         I called [facility] and set new appointments with first available. I documented these in the AVS.  [facility] [address]  [address] [place] [address]  General Information: [phone]  Appointments: [phone]  If you need to get in earlier, call the nurse line - [phone].  The Health Unit Coordinator has faxed these discharge instructions have been faxed to fax: [phone]      Pt reports she uses medical cabs for transportation.    Deborah from [facility] called back and asked for a clinical update as to med adherence and suicidal ideation. We agreed I would call her mid-morning about the final determination of discharge.    Discharge Plan or Goal  Pending stabilization & development of a safe discharge plan.  Considerations include: return to [facility]    Barriers to Discharge  Patient requires further psychiatric stabilization due to current symptomology    Referral  Status  resturn to estbalished providers    Legal Status  Patient is voluntary

## 2021-05-27 NOTE — PLAN OF CARE
"  Problem: Suicidal Behavior  Goal: Suicidal Behavior is Absent or Managed  Outcome: Improving      Pt's affect is flat blunted, mood is calm.  Pt observed out in the lounge area sitting quietly, keeps to herself.  No interaction with others.  Pt reports sleep and appetite as good and was med compliant.  Pt denies visual hallucinations, endorses auditory hallucinations and states \"I usually hear satan talking to me\".  Pt also reported anxiety, klonopin prn administered, pt reported no anxiety and was feeling calm.  Pt denies suicidal ideation or intent, reports feeling safe in the hospital.  Will continue to monitor pt.      "

## 2021-05-28 VITALS
DIASTOLIC BLOOD PRESSURE: 76 MMHG | BODY MASS INDEX: 43.53 KG/M2 | HEIGHT: 65 IN | TEMPERATURE: 97.4 F | WEIGHT: 261.3 LBS | RESPIRATION RATE: 16 BRPM | OXYGEN SATURATION: 100 % | HEART RATE: 99 BPM | SYSTOLIC BLOOD PRESSURE: 107 MMHG

## 2021-05-28 PROCEDURE — 250N000013 HC RX MED GY IP 250 OP 250 PS 637: Performed by: NURSE PRACTITIONER

## 2021-05-28 PROCEDURE — 99239 HOSP IP/OBS DSCHRG MGMT >30: CPT | Performed by: PSYCHIATRY & NEUROLOGY

## 2021-05-28 PROCEDURE — 250N000013 HC RX MED GY IP 250 OP 250 PS 637: Performed by: PSYCHIATRY & NEUROLOGY

## 2021-05-28 RX ORDER — QUETIAPINE FUMARATE 50 MG/1
TABLET, EXTENDED RELEASE ORAL
Qty: 308 TABLET | Refills: 0 | Status: SHIPPED | OUTPATIENT
Start: 2021-05-28 | End: 2021-11-10

## 2021-05-28 RX ORDER — LAMOTRIGINE 25 MG/1
TABLET ORAL
Qty: 95 TABLET | Refills: 0 | Status: SHIPPED | OUTPATIENT
Start: 2021-05-28 | End: 2024-02-02

## 2021-05-28 RX ADMIN — BUPROPION HYDROCHLORIDE 450 MG: 300 TABLET, EXTENDED RELEASE ORAL at 11:02

## 2021-05-28 RX ADMIN — POLYETHYLENE GLYCOL 3350 17 G: 17 POWDER, FOR SOLUTION ORAL at 08:33

## 2021-05-28 RX ADMIN — CLONAZEPAM 0.5 MG: 0.5 TABLET ORAL at 08:35

## 2021-05-28 RX ADMIN — Medication 2 SPRAY: at 08:37

## 2021-05-28 RX ADMIN — LAMOTRIGINE 25 MG: 25 TABLET ORAL at 08:34

## 2021-05-28 RX ADMIN — VENLAFAXINE HYDROCHLORIDE 225 MG: 150 CAPSULE, EXTENDED RELEASE ORAL at 11:02

## 2021-05-28 RX ADMIN — QUETIAPINE FUMARATE 200 MG: 50 TABLET, EXTENDED RELEASE ORAL at 08:34

## 2021-05-28 NOTE — PLAN OF CARE
Pt appears to have slept 5.5 hours this shift.  Pt did not offer any concerns and none noted.  15 minute checks remain ongoing.  Will continue to monitor and support plan of care.

## 2021-05-28 NOTE — DISCHARGE SUMMARY
"Psychiatric Discharge Summary    Misty Parham MRN# [id number]   Age: 35 year old YOB: 1985     Date of Admission:  5/21/2021  Date of Discharge:  5/28/2021 11:52 AM  Admitting Physician:  Huey Redding MD  Discharge Physician:  Shari Mcfadden DO         Event Leading to Hospitalization:   Misty Parham is a 35-year-old female admitted to [address] on 5/22/2021.  She was admitted on a 72-hour hold and ongoing commitment MI with Bhargav due to depressive symptoms, suicidal ideation and auditory hallucinations commanding her to commit suicide.  She was discharged from [facility] to [facility] on 4/20/2021.  Provider during that hospitalization had filed a petition for commitment MI with Bhargav in [facility] for recommitment at the request of her outpatient .  Her original commitment was through 5/26/2021.  The patient went to court on 5/18/2021 and reports the outcome has not yet been determined, though per the DEC assessment she was recommitted through 5/25/2022.  She said that her experience at [facility] has been \"okay.\"  Stressors include the fact that her 14-year-old son will be going to [facility] with family for 1 year on 5/24.  She stopped taking medications about a week prior to admission, explaining that \"I could get up in the morning\" to take them.  She tried climbing a fence [place] access [place] [address] with the intention of being hit by a truck.  She was lying in bed, stated she did not want to talk, and participated minimally in the admission assessment.       See Admission note by Nissa DORSEY CNP on 5/22/21 for additional details.          Diagnoses:     Borderline personality disorder  Major depressive disorder, recurrent, severe with psychotic features  Generalized anxiety disorder  Suicidal ideation  Restless legs         Labs:     Recent Results (from the past 336 hour(s))   COVID-19 VIRUS (CORONAVIRUS) BY PCR - " EXTERNAL RESULT    Collection Time: 21 12:00 PM   Result Value Ref Range    COVID-19 Virus by PCR (External Result) Not Detected Not Detected   Asymptomatic SARS-CoV-2 COVID-19 Virus (Coronavirus) by PCR    Collection Time: 21  3:42 AM    Specimen: Nasopharyngeal   Result Value Ref Range    SARS-CoV-2 Virus Specimen Source Nasopharyngeal     SARS-CoV-2 PCR Result NEGATIVE     SARS-CoV-2 PCR Comment (Note)    CBC with platelets    Collection Time: 21  7:14 AM   Result Value Ref Range    WBC 6.7 4.0 - 11.0 10e9/L    RBC Count 4.65 3.8 - 5.2 10e12/L    Hemoglobin 12.9 11.7 - 15.7 g/dL    Hematocrit 40.8 35.0 - 47.0 %    MCV 88 78 - 100 fl    MCH 27.7 26.5 - 33.0 pg    MCHC 31.6 31.5 - 36.5 g/dL    RDW 15.4 (H) 10.0 - 15.0 %    Platelet Count 346 150 - 450 10e9/L   Comprehensive metabolic panel    Collection Time: 21  7:14 AM   Result Value Ref Range    Sodium 139 133 - 144 mmol/L    Potassium 3.8 3.4 - 5.3 mmol/L    Chloride 106 94 - 109 mmol/L    Carbon Dioxide 29 20 - 32 mmol/L    Anion Gap 4 3 - 14 mmol/L    Glucose 90 70 - 99 mg/dL    Urea Nitrogen 8 7 - 30 mg/dL    Creatinine 0.84 0.52 - 1.04 mg/dL    GFR Estimate 89 >60 mL/min/[1.73_m2]    GFR Estimate If Black >90 >60 mL/min/[1.73_m2]    Calcium 9.1 8.5 - 10.1 mg/dL    Bilirubin Total 0.4 0.2 - 1.3 mg/dL    Albumin 3.5 3.4 - 5.0 g/dL    Protein Total 7.2 6.8 - 8.8 g/dL    Alkaline Phosphatase 78 40 - 150 U/L    ALT 18 0 - 50 U/L    AST 17 0 - 45 U/L   Lipid panel    Collection Time: 21  7:14 AM   Result Value Ref Range    Cholesterol 162 <200 mg/dL    Triglycerides 81 <150 mg/dL    HDL Cholesterol 40 (L) >49 mg/dL    LDL Cholesterol Calculated 106 (H) <100 mg/dL    Non HDL Cholesterol 122 <130 mg/dL   TSH with free T4 reflex and/or T3 as indicated    Collection Time: 21  7:14 AM   Result Value Ref Range    TSH 1.19 0.40 - 4.00 mU/L   Drug Abuse Scr  Ur w Qnt Reflx    Collection Time: 21  9:09 AM   Result Value  "Ref Range    Alcohol Ethyl Urine Negative     Amphetamine Urine Negative     Barbiturates Urine Negative     Benzodiazepines Urine Negative     Cocaine Metabolite Urine Negative     Opiates Urine Negative     Oxycodone Urine Negative     Methadone Urine Negative     Phencyclidine Urine Negative     THC Metabolite Urine Negative     Creatinine Urine Drug Screen 32 >20 mg/dl   HCG qualitative urine    Collection Time: 05/26/21  9:09 AM   Result Value Ref Range    HCG Qual Urine Negative NEG^Negative              Consults:   No consultations were requested during this admission         Hospital Course:   Misty Parham was admitted to [facility]  as a voluntary patient, is on commitment but PD not revoked. The patient was placed under status 15 (15 minute checks) to ensure patient safety. Labs obtained as above. PTA medications were continued, some resumed at lower doses due to non-adherence PTA. All medications were back to PTA doses by discharge with exception of lamotrigine and quetiapine which were still be titrated. Pt initially isolated to room and reported SI and CAH telling her to harm herself with difficulty challenging these voices. She was able to maintain safety on unit, no attempts to harm self. Her SI improved and the AH became \"quieter\" and she was able to challenge these voices. She declined medication on one occasion early in admission due to reports of \"talking to satan\" but remained adherent for remainder of admission and took medications without difficulties. She was more visible in the milieu, spent time out of her room listening to music and reading books. Did require some doses of PRN clonazepam which were helpful when having increased anxiety around . She reported she was feeling safe and ready to discharge back to [facility].     Today Misty Parham reports having no thoughts of harming self or others. In addition, she has notable risk factors for self-harm, including age, single status, " psychosis and previous suicide attempts. However, risk is mitigated by commitment to family, sobriety, ability to volunteer a safety plan and history of seeking help when needed. Therefore, based on all available evidence including the factors cited above, she does not appear to be at imminent risk for self-harm, does not meet criteria for a 72-hr hold, and therefore remains appropriate for ongoing outpatient level of care.     Misty Parham was discharged back to [facility]. At the time of discharge Misty Parham was determined to not be a danger to herself or others.          Discharge Medications:     Current Discharge Medication List      CONTINUE these medications which have CHANGED    Details   lamoTRIgine (LAMICTAL) 25 MG tablet Take 1 tablet (25 mg) by mouth daily for 11 days, THEN 2 tablets (50 mg) daily for 14 days, THEN 4 tablets (100 mg) daily for 14 days.  Qty: 95 tablet, Refills: 0    Associated Diagnoses: Suicidal ideation      QUEtiapine (SEROQUEL XR) 50 MG TB24 24 hr tablet Take 4 tablets (200 mg) by mouth 2 times daily for 7 days, THEN 6 tablets (300 mg) 2 times daily for 21 days.  Qty: 308 tablet, Refills: 0    Associated Diagnoses: Auditory hallucination         CONTINUE these medications which have NOT CHANGED    Details   artificial saliva (BIOTENE MT) SOLN solution Swish and spit 2 mLs (2 sprays) in mouth 2 times daily  Qty: 44.3 mL, Refills: 0    Associated Diagnoses: Facial cellulitis      !! buPROPion (WELLBUTRIN XL) 150 MG 24 hr tablet Take 150 mg by mouth every morning Take along with 300 mg tablet for a total daily dose of 450 mg      !! buPROPion (WELLBUTRIN XL) 300 MG 24 hr tablet Take 1 tablet (300 mg) by mouth daily  Qty: 30 tablet, Refills: 0    Associated Diagnoses: Major depressive disorder, single episode, severe with psychotic features (H); Borderline personality disorder (H)      clonazePAM (KLONOPIN) 0.5 MG tablet Take 1 tablet (0.5 mg) by mouth 2 times daily as needed  "for anxiety  Qty: 60 tablet, Refills: 0    Associated Diagnoses: Major depressive disorder, single episode, severe with psychotic features (H)      polyethylene glycol (MIRALAX) 17 GM/Dose powder Take 17 g by mouth daily  Qty: 510 g, Refills: 0    Associated Diagnoses: Other constipation      protriptyline (VIVACTIL) 10 MG tablet Take 1 tablet (10 mg) by mouth At Bedtime  Qty: 30 tablet, Refills: 0    Associated Diagnoses: Major depressive disorder, single episode, severe with psychotic features (H); Borderline personality disorder (H)      ramelteon (ROZEREM) 8 MG tablet Take 1 tablet (8 mg) by mouth At Bedtime  Qty: 30 tablet, Refills: 0    Associated Diagnoses: Primary insomnia      rOPINIRole (REQUIP) 1 MG tablet Take 1 tablet (1 mg) by mouth At Bedtime  Qty: 30 tablet, Refills: 0    Associated Diagnoses: Restless leg syndrome      venlafaxine (EFFEXOR-XR) 75 MG 24 hr capsule Take 3 capsules (225 mg) by mouth daily (with breakfast)  Qty: 90 capsule, Refills: 0    Associated Diagnoses: Generalized anxiety disorder; Major depressive disorder, single episode, severe with psychotic features (H); Borderline personality disorder (H)       !! - Potential duplicate medications found. Please discuss with provider.               Psychiatric Examination:   Appearance: awake, alert, dressed in hospital scrubs and improved grooming  Attitude:  cooperative and less guarded  Eye Contact: good, improved  Mood:  \"good\"d  Affect:  mood congruent, brighter, more reactive, did smile during interview  Speech:  clear, coherent and minimal, soft volume, more spontaneous speech  Language: fluent and intact in English  Psychomotor, Gait, Musculoskeletal:  no evidence of tardive dyskinesia, dystonia, or tics  Thought Process:  goal oriented  Associations:  no loose associations  Thought Content:  denies SI, reports AH are \"quieter\" and at times tell her to harm self but able to challenge these AH and denies having any plan or intent to " harm self, denies VH, denies HI  Insight:  limited  Judgement:  limited, adequate for safety   Oriented to:  time, person, and place  Attention Span and Concentration:  fair  Recent and Remote Memory:  fair  Fund of Knowledge:  appropriate         Discharge Plan:   Health Care Follow-up:   You are on a plan called [facility]. You will need to coordinate your services through [facility]. Member Services: [phone]  You can also get transportation through this number.           You have a civil commitment order for mental illness, dated May 21, 2021, File No. [id number]-GU-.  You are committed to the Commissioner of Human Services and  [facility].  This expires on May 21, 2022.        You have an order authorizing use of neuroleptic medication, dated May 21, 2021, File No. [id number]-.  Medications that can be administered are: Zyprexa (olanzepine), Seroquel (quetiapine), Latuda (lurasidone) and/or Abilify (aripiprazole).        Continue to coordinate your services with your mental health .  [facility]  - Samreen Machuca   P:[phone]   The Health Unit Coordinator has faxed these discharge instructions to fax [phone]            Return to  [address] floor - [phone] or [phone] and [phone]  The Health Unit Coordinator has faxed these discharge instructions to Fax:    [phone]                                                                                          Participate in your video therapy session with therapist on Thursday, Shaina 10, 2021 at 1PM.  Therapy - Anderson Moss, [facility]  [address]     [employer]  Ph:  [phone]                   Participate in your phone psychiatric medication management appointment with Dr. Colby Karimi on Tuesday, June 29, 2021 @ 2PM.  [facility]  [facility]  Building  [address]  2nd and [place]rd [place] [address]  General Information: [phone]  Appointments: [phone]  If you need to get in earlier, call the nurse line - [phone].  The Health Unit Coordinator has faxed these discharge instructions have been faxed to fax: [phone]              Return to your PCP - Monse Chen as needed.  Address: [address] [place] [address] [place] [address]  Phone: [phone] fax: [phone]        Attend all scheduled appointments with your outpatient providers. Call at least 24 hours in advance if you need to reschedule an appointment to ensure continued access to your outpatient providers.     Attestation:  Patient has been seen and evaluated by me, Shari Mcfadden DO on day of discharge. 35 minutes were spent in coordination of discharge planning.

## 2021-05-28 NOTE — PLAN OF CARE
Assessment/Intervention/Current Symtoms and Care Coordination      Ready for  today.  I coordinated transfer with [facility] and arranged for cab through [facility].    Discharge Plan or Goal    Behavioral Discharge Planning and Instructions    Summary: You were admitted on 5/21/2021  due to Psychotic Symptomology and Suicidal Ideations.  You were not taking your medications You were treated by Dr. Mcfadden. Medications were restarted.  You remained on a provisional discharge and this was not revoked. You remained a voluntary patient. Your mood improved and you were no longer a danger to yourself. You were discharged on 5/28/21 from [facility] [address]  to [facility] and [place]. A ride was arrenged through [facility] for your discharge transportation.        Main Diagnosis:   Borderline personality disorder  Major depressive disorder, recurrent, severe with psychotic features  Generalized anxiety disorder  Suicidal ideation  Restless legs      Health Care Follow-up:   You are on a plan called [facility]. You will need to coordinate your services through [facility]. Member Services: [phone]  You can also get transportation through this number.        You have a civil commitment order for mental illness, dated May 21, 2021, File No. [id number]-.  You are committed to the Commissioner of Human Services and  [facility].  This expires on May 21, 2022.      You have an order authorizing use of neuroleptic medication, dated May 21, 2021, File No. [id number]-.  Medications that can be administered are: Zyprexa (olanzepine), Seroquel (quetiapine), Latuda (lurasidone) and/or Abilify (aripiprazole).      Continue to coordinate your services with your mental health .  [facility]  - Samreen Machuca   P:[phone]   The Health Unit Coordinator has faxed these discharge instructions to fax [phone]          Return to  [place]  [facility]idence.  [address] [place] [address]  3rd floor - [phone] or [phone] and [phone]  The Health Unit Coordinator has faxed these discharge instructions to Fax:    [phone]                                                                                    Participate in your video therapy session with therapist on Thursday, Shaina 10, 2021 at 1PM.  Therapy - Anderson Moss, [facility]  [address] [place]  Ph:  [phone]               Participate in your phone psychiatric medication management appointment with Dr. Colby Karimi on Tuesday, June 29, 2021 @ 2PM.  [facility]  [address]  [address]nd and [place]rd [place] [address]  General Information: [phone]  Appointments: [phone]  If you need to get in earlier, call the nurse line - [phone].  The Health Unit Coordinator has faxed these discharge instructions have been faxed to fax: [phone]          Return to your PCP - Monse Chen as needed.  Address: [address] [place] [address] [place] [address]  Phone: [phone] fax: [phone]       Attend all scheduled appointments with your outpatient providers. Call at least 24 hours in advance if you need to reschedule an appointment to ensure continued access to your outpatient providers.     Major Treatments, Procedures and Findings:  You were provided with: a psychiatric assessment, assessed for medical stability, medication evaluation and/or management, group therapy and milieu management    Your Covid-19 test was negagtive.  SARS-CoV-2 Virus Specimen Source 05/22/2021  3:42 AM 13   Nasopharyngeal    SARS-CoV-2 PCR Result 05/22/2021  3:42 AM 13   NEGATIVE    Comment:   SARS-CoV2 (COVID-19) RNA not detected, presumed negative.         Your drug screen was negative.      Your pregnancy test was negative.  HCG Qual Urine Negative   NEG^Negative  Final 05/26/2021   9:09 AM             Symptoms to Report: mood getting worse or thoughts of suicide    Early warning signs can include: increased thoughts or behaviors of suicide or self-harm     Safety and Wellness:  Take all medicines as directed.  Make no changes unless your doctor suggests them.      Follow treatment recommendations.  Refrain from alcohol and non-prescribed drugs.  If there is a concern for safety, call 911.    Resources:   [employer] [place] [employer] Mental Illness (www.[place].org): 669.766.8278 or 092-800-3828.        COPE (Community Outreach for Psychiatric Emergencies): 591.218.7680    In the [facility], call **CRISIS (721438) from a cell phone to talk to a team of professionals who can help you.    Crisis Text Line: Text [place] to 513653    These are   crisis residences where you can stay for a short time if you feel like you need to stabilize but do not need to go to the hospital.    [facility]  - Crisis Beds  1.  [facility] Crisis Stabilization Program  [address] [place] [address]  Phone:[phone]      General Medication Instructions:   See your medication sheet(s) for instructions.   Take all medicines as directed.  Make no changes unless your doctor suggests them.   Go to all your doctor visits.  Be sure to have all your required lab tests. This way, your medicines can be refilled on time.  Do not use any drugs not prescribed by your doctor.  Avoid alcohol.    Advance Directives:   Scanned document on file with [facility]? No scanned doc  Is document scanned? Pt states no documents  Honoring Choices Your Rights Handout: Informed and given  Was more information offered? Materials given    The Treatment team has appreciated the opportunity to work with you. If you have any questions or concerns about your recent admission, you can contact the unit which can receive your call 24 hours a day, 7 days a week. They will be able to get in touch with a Provider if needed. The unit number  is [phone] .      Barriers to Discharge    None    Referral Status  Completed    Legal Status    Voluntary but on a provisional discharge for committment

## 2021-05-28 NOTE — DISCHARGE INSTRUCTIONS
Behavioral Discharge Planning and Instructions    Summary: You were admitted on 5/21/2021  due to Psychotic Symptomology and Suicidal Ideations.  You were not taking your medications You were treated by Dr. Mcfadden. Medications were restarted.  You remained on a provisional discharge and this was not revoked. You remained a voluntary patient. Your mood improved and you were no longer a danger to yourself. You were discharged on 5/28/21 from [facility]  to [facility] [place]. A ride was arrenged through [facility] for your discharge transportation.        Main Diagnosis:   Borderline personality disorder  Major depressive disorder, recurrent, severe with psychotic features  Generalized anxiety disorder  Suicidal ideation  Restless legs      Health Care Follow-up:   You are on a plan called [facility]. You will need to coordinate your services through [facility]. Member Services: [phone]  You can also get transportation through this number.        You have a civil commitment order for mental illness, dated May 21, 2021, File No. 97-SE-JC-.  You are committed to the Commissioner of Human Services and  [facility].  This expires on May 21, 2022.      You have an order authorizing use of neuroleptic medication, dated May 21, 2021, File No. 23-AC-WQ-.  Medications that can be administered are: Zyprexa (olanzepine), Seroquel (quetiapine), Latuda (lurasidone) and/or Abilify (aripiprazole).      Continue to coordinate your services with your mental health .  [facility]  - Samreen Machuca   P:[phone]   The Health Unit Coordinator has faxed these discharge instructions to fax [phone]          Return to  [facility].  [address] [place] [address]  3rd floor - [phone] or [phone] and [phone]  The Health Unit Coordinator has faxed these discharge instructions to Fax:    [phone]                                                                                     Participate in your video therapy session with therapist on Thursday, Shaina 10, 2021 at 1PM.  Therapy - Anderson Moss, [place] - [facility]  [address] [place]  Ph:  [phone]               Participate in your phone psychiatric medication management appointment with Dr. Colby Karimi on Tuesday, June 29, 2021 @ 2PM.  [facility]  [facility]  [address]  2nd and 3rd [place] [address]  General Information: [phone]  Appointments: [phone]  If you need to get in earlier, call the nurse line - [phone].  The Health Unit Coordinator has faxed these discharge instructions have been faxed to fax: [phone]          Return to your PCP - Monse Chen as needed.  Address: [address] [place] [address] [place] [address]  Phone: [phone] fax: [phone]       Attend all scheduled appointments with your outpatient providers. Call at least 24 hours in advance if you need to reschedule an appointment to ensure continued access to your outpatient providers.     Major Treatments, Procedures and Findings:  You were provided with: a psychiatric assessment, assessed for medical stability, medication evaluation and/or management, group therapy and milieu management    Your Covid-19 test was negagtive.  SARS-CoV-2 Virus Specimen Source 05/22/2021  3:42 AM 13   Nasopharyngeal    SARS-CoV-2 PCR Result 05/22/2021  3:42 AM 13   NEGATIVE    Comment:   SARS-CoV2 (COVID-19) RNA not detected, presumed negative.         Your drug screen was negative.      Your pregnancy test was negative.  HCG Qual Urine Negative   NEG^Negative  Final 05/26/2021  9:09 AM             Symptoms to Report: mood getting worse or thoughts of suicide    Early warning signs can include: increased thoughts or behaviors of suicide or self-harm     Safety and Wellness:  Take all medicines as  directed.  Make no changes unless your doctor suggests them.      Follow treatment recommendations.  Refrain from alcohol and non-prescribed drugs.  If there is a concern for safety, call 531.    Resources:   National [place] on Mental Illness (www.[place].davian.org): 166.955.2673 or 195-885-3188.        COPE (Community Outreach for Psychiatric Emergencies): [phone]    In the [facility], call **CRISIS (309394) from a cell phone to talk to a team of professionals who can help you.    Crisis Text Line: Text [place] to 830125    These are   crisis residences where you can stay for a short time if you feel like you need to stabilize but do not need to go to the hospital.    [facility]  - Crisis Beds  1.  [facility]  [address] [place] [address]  Phone:[phone]      General Medication Instructions:   See your medication sheet(s) for instructions.   Take all medicines as directed.  Make no changes unless your doctor suggests them.   Go to all your doctor visits.  Be sure to have all your required lab tests. This way, your medicines can be refilled on time.  Do not use any drugs not prescribed by your doctor.  Avoid alcohol.    Advance Directives:   Scanned document on file with [employer]? No scanned doc  Is document scanned? Pt states no documents  Honoring Choices Your Rights Handout: Informed and given  Was more information offered? Materials given    The Treatment team has appreciated the opportunity to work with you. If you have any questions or concerns about your recent admission, you can contact the unit which can receive your call 24 hours a day, 7 days a week. They will be able to get in touch with a Provider if needed. The unit number is [phone] .

## 2021-05-28 NOTE — PLAN OF CARE
Problem: Suicidal Behavior  Goal: Suicidal Behavior is Absent or Managed  Outcome: Improving     Misty remains guarded and isolative. Continues to endorse anxiety. She was in the milieu for a short time during a movie, but does not interact with peers or staff. On interactions with this RN, pt responds very quietly and typically with one word answers. She requested PRN Clonazepam and said she would do her medications 1 hour after that, though she required multiple prompts from RN and did not take her medications until after 2200.  She continues to endorse auditory hallucinations of Satan telling her to kill herself but contracts for safety. She reports Seroquel to be helpful in decreasing auditory hallucinations.

## 2021-05-28 NOTE — PLAN OF CARE
Problem: Behavioral Health Plan of Care  Goal: Plan of Care Review  Outcome: Adequate for Discharge    Pt is discharging from [facility]: [facility] back to [facility].  Pt will be picked up at 11:30 am and will be escorted by staff.  Pt has been given copy of her discharge instructions and medication administration instructions. All discharge plans were discussed with patient. Pt reports no questions at this time regarding discharge plans or medications. Pt denies any suicidal ideation, plans or intent at this time. Pt has received all her belongings.

## 2021-06-25 ENCOUNTER — OFFICE VISIT (OUTPATIENT)
Dept: FAMILY MEDICINE | Facility: CLINIC | Age: 36
End: 2021-06-25
Payer: COMMERCIAL

## 2021-06-25 VITALS
BODY MASS INDEX: 42.5 KG/M2 | TEMPERATURE: 98.9 F | WEIGHT: 270.8 LBS | HEIGHT: 67 IN | OXYGEN SATURATION: 97 % | RESPIRATION RATE: 14 BRPM | HEART RATE: 110 BPM

## 2021-06-25 DIAGNOSIS — E66.01 MORBID OBESITY (H): ICD-10-CM

## 2021-06-25 DIAGNOSIS — F33.3 SEVERE EPISODE OF RECURRENT MAJOR DEPRESSIVE DISORDER, WITH PSYCHOTIC FEATURES (H): ICD-10-CM

## 2021-06-25 DIAGNOSIS — K59.04 CHRONIC IDIOPATHIC CONSTIPATION: Primary | ICD-10-CM

## 2021-06-25 PROCEDURE — 99213 OFFICE O/P EST LOW 20 MIN: CPT | Performed by: FAMILY MEDICINE

## 2021-06-25 RX ORDER — SENNA AND DOCUSATE SODIUM 50; 8.6 MG/1; MG/1
1 TABLET, FILM COATED ORAL
Qty: 90 TABLET | Refills: 0 | Status: SHIPPED | OUTPATIENT
Start: 2021-06-25 | End: 2023-12-13

## 2021-06-25 RX ORDER — DOCUSATE SODIUM 100 MG/1
100 CAPSULE, LIQUID FILLED ORAL 2 TIMES DAILY
Status: ON HOLD | COMMUNITY
End: 2024-02-28

## 2021-06-25 ASSESSMENT — ANXIETY QUESTIONNAIRES
2. NOT BEING ABLE TO STOP OR CONTROL WORRYING: MORE THAN HALF THE DAYS
5. BEING SO RESTLESS THAT IT IS HARD TO SIT STILL: SEVERAL DAYS
IF YOU CHECKED OFF ANY PROBLEMS ON THIS QUESTIONNAIRE, HOW DIFFICULT HAVE THESE PROBLEMS MADE IT FOR YOU TO DO YOUR WORK, TAKE CARE OF THINGS AT HOME, OR GET ALONG WITH OTHER PEOPLE: EXTREMELY DIFFICULT
1. FEELING NERVOUS, ANXIOUS, OR ON EDGE: MORE THAN HALF THE DAYS
3. WORRYING TOO MUCH ABOUT DIFFERENT THINGS: NEARLY EVERY DAY
7. FEELING AFRAID AS IF SOMETHING AWFUL MIGHT HAPPEN: SEVERAL DAYS
GAD7 TOTAL SCORE: 13
6. BECOMING EASILY ANNOYED OR IRRITABLE: MORE THAN HALF THE DAYS

## 2021-06-25 ASSESSMENT — PATIENT HEALTH QUESTIONNAIRE - PHQ9
5. POOR APPETITE OR OVEREATING: MORE THAN HALF THE DAYS
SUM OF ALL RESPONSES TO PHQ QUESTIONS 1-9: 21

## 2021-06-25 ASSESSMENT — MIFFLIN-ST. JEOR: SCORE: 1938.58

## 2021-06-25 NOTE — PROGRESS NOTES
"    Assessment & Plan     Chronic idiopathic constipation  Cont miralax   Discussed dietary modification  - SENNA-docusate sodium (SENNA S) 8.6-50 MG tablet; Take 1 tablet by mouth nightly as needed (constipation)    Morbid obesity (H)    Severe episode of recurrent major depressive disorder, with psychotic features (H)  Discussed suicidal ideation and current plan of care  Pt is recently discharged from the hospital and feels stable in her current facility where she gets psychotherapy and f/u with psychiatry      Depression Screening Follow Up    PHQ 6/25/2021   PHQ-9 Total Score 21   Q9: Thoughts of better off dead/self-harm past 2 weeks Nearly every day     Alaina De La Cruz DO  [facility] ROSLYN Jay is a 35 year old who presents for the following health issues:    HPI     Pt presenting to discuss chronic constipation. On mirilax, but having BMs as infrequently as once per week. Hard stool with straining. No blood in stool.  Hx severe depression with suicidal ideation and auditory hallucinations. Currently in psychiatric living facility, recently discharged from inpatient admission for suicide attempt. Reports continued suicidal ideation, but feels safe returning to facility. Ride set up for transportation. Has psychotherapy and psychiatry established.    Review of Systems   Constitutional, HEENT, cardiovascular, pulmonary, gi and gu systems are negative, except as otherwise noted.      Objective    Pulse 110   Temp 98.9  F (37.2  C) (Oral)   Resp 14   Ht 1.69 m (5' 6.54\")   Wt 122.8 kg (270 lb 12.8 oz)   LMP 06/10/2021 (Approximate)   SpO2 97%   BMI 43.01 kg/m    Body mass index is 43.01 kg/m .  Physical Exam   GENERAL: healthy, alert and no distress  RESP: lungs clear to auscultation - no rales, rhonchi or wheezes  CV: regular rate and rhythm, normal S1 S2, no S3 or S4, no murmur, click or rub, no peripheral edema and peripheral pulses strong  ABDOMEN: soft, nontender, " no hepatosplenomegaly, no masses and bowel sounds normal  MS: no gross musculoskeletal defects noted, no edema

## 2021-06-26 ASSESSMENT — ANXIETY QUESTIONNAIRES: GAD7 TOTAL SCORE: 13

## 2021-08-17 ENCOUNTER — DOCUMENTATION ONLY (OUTPATIENT)
Dept: FAMILY MEDICINE | Facility: CLINIC | Age: 36
End: 2021-08-17
Payer: COMMERCIAL

## 2021-08-17 NOTE — PROGRESS NOTES
"When opening a documentation only encounter, be sure to enter in \"Chief Complaint\" Forms and in \" Comments\" Title of form, description if needed.    Misty is a 36 year old  female  Form received via: Mail  Form now resides in: Provider Allan Brown                  "

## 2021-09-01 ENCOUNTER — OFFICE VISIT (OUTPATIENT)
Dept: FAMILY MEDICINE | Facility: CLINIC | Age: 36
End: 2021-09-01
Payer: COMMERCIAL

## 2021-09-01 VITALS
HEART RATE: 105 BPM | RESPIRATION RATE: 16 BRPM | OXYGEN SATURATION: 96 % | TEMPERATURE: 98.6 F | DIASTOLIC BLOOD PRESSURE: 74 MMHG | SYSTOLIC BLOOD PRESSURE: 97 MMHG | WEIGHT: 278.2 LBS | BODY MASS INDEX: 44.18 KG/M2

## 2021-09-01 DIAGNOSIS — Z00.00 ENCOUNTER FOR ROUTINE ADULT HEALTH EXAMINATION WITHOUT ABNORMAL FINDINGS: Primary | ICD-10-CM

## 2021-09-01 DIAGNOSIS — Z12.4 CERVICAL CANCER SCREENING: ICD-10-CM

## 2021-09-01 PROCEDURE — 87624 HPV HI-RISK TYP POOLED RSLT: CPT | Performed by: FAMILY MEDICINE

## 2021-09-01 PROCEDURE — 99395 PREV VISIT EST AGE 18-39: CPT | Performed by: FAMILY MEDICINE

## 2021-09-01 PROCEDURE — 88175 CYTOPATH C/V AUTO FLUID REDO: CPT | Performed by: FAMILY MEDICINE

## 2021-09-01 RX ORDER — LANOLIN ALCOHOL/MO/W.PET/CERES
3 CREAM (GRAM) TOPICAL AT BEDTIME
COMMUNITY
Start: 2021-08-10 | End: 2024-08-27

## 2021-09-01 RX ORDER — VITAMIN B COMPLEX
1000 TABLET ORAL DAILY
Status: ON HOLD | COMMUNITY
End: 2024-02-28

## 2021-09-01 RX ORDER — TRAZODONE HYDROCHLORIDE 100 MG/1
100 TABLET ORAL DAILY
COMMUNITY
Start: 2021-08-09 | End: 2023-08-23

## 2021-09-01 RX ORDER — HYDROXYZINE HYDROCHLORIDE 50 MG/1
50 TABLET, FILM COATED ORAL 3 TIMES DAILY PRN
COMMUNITY
End: 2024-02-02

## 2021-09-01 ASSESSMENT — PATIENT HEALTH QUESTIONNAIRE - PHQ9: SUM OF ALL RESPONSES TO PHQ QUESTIONS 1-9: 24

## 2021-09-02 NOTE — PROGRESS NOTES
Female Physical Note          HPI         Concerns today: Pt w/ significant, persistent depression with SI. Living in group home, engaged in therapy and established with psych.    Patient Active Problem List   Diagnosis     Depression     Suicidal ideation     Depression with suicidal ideation     Major depression     Suicidal behavior     Major depressive disorder, single episode, severe with psychotic features (H)     Borderline personality disorder (H)     Facial cellulitis     Auditory hallucination     Psychosis, unspecified psychosis type (H)     Morbid obesity (H)       Past Medical History:   Diagnosis Date     Depressive disorder        Previous Medical Care      Family History   Problem Relation Age of Onset     Diabetes Father               Review of Systems:     Review of Systems:  CONSTITUTIONAL: NEGATIVE for fever, chills, change in weight  INTEGUMENTARY/SKIN: NEGATIVE for worrisome rashes, moles or lesions  EYES: NEGATIVE for vision changes or irritation  ENT/MOUTH: NEGATIVE for ear, mouth and throat problems  RESP: NEGATIVE for significant cough or SOB  BREAST: NEGATIVE for masses, tenderness or discharge  CV: NEGATIVE for chest pain, palpitations or peripheral edema  GI: NEGATIVE for nausea, abdominal pain, heartburn, or change in bowel habits  : NEGATIVE for frequency, dysuria, or hematuria  MUSCULOSKELETAL: NEGATIVE for significant arthralgias or myalgia  NEURO: NEGATIVE for weakness, dizziness or paresthesias  ENDOCRINE: NEGATIVE for temperature intolerance, skin/hair changes  HEME/ALLERGY: NEGATIVE for bleeding problems  PSYCHIATRIC: POSITIVE for depressed mood and suicidal thoughts with out a plan           Social History     Social History     Socioeconomic History     Marital status: Legally      Spouse name: Not on file     Number of children: Not on file     Years of education: Not on file     Highest education level: Not on file   Occupational History     Not on file   Tobacco  Use     Smoking status: Never Smoker     Smokeless tobacco: Never Used   Vaping Use     Vaping Use: Never used   Substance and Sexual Activity     Alcohol use: Not Currently     Drug use: Never     Sexual activity: Not Currently   Other Topics Concern     Not on file   Social History Narrative     Not on file     Social Determinants of Health     Financial Resource Strain:      Difficulty of Paying Living Expenses:    Food Insecurity:      Worried About Running Out of Food in the Last Year:      Ran Out of Food in the Last Year:    Transportation Needs:      Lack of Transportation (Medical):      Lack of Transportation (Non-Medical):    Physical Activity:      Days of Exercise per Week:      Minutes of Exercise per Session:    Stress:      Feeling of Stress :    Social Connections:      Frequency of Communication with Friends and Family:      Frequency of Social Gatherings with Friends and Family:      Attends [employer] Services:      Active Member of Clubs or Organizations:      Attends Club or Organization Meetings:      Marital Status:    Intimate Partner Violence:      Fear of Current or Ex-Partner:      Emotionally Abused:      Physically Abused:      Sexually Abused:        Marital Status:  Who lives in your household? Lives at [facility]    Has anyone hurt you physically, for example by pushing, hitting, slapping or kicking you or forcing you to have sex? Denies  Do you feel threatened or controlled by a partner, ex-partner or anyone in your life? Denies    Sexual Health     Sexual concerns: No   STI History: Neg  Pregnancy History: No obstetric history on file.  LMP Patient's last menstrual period was 08/27/2021 (exact date).   Last Pap Smear Date: No results found for: PAP  Abnormal Pap History: None      Recommended Screening     Pap/HPV cotest every 5 years for women 30-65   Recommended and patient accepted testing.           Physical Exam:     Vitals: BP 97/74   Pulse 105   Temp 98.6  F  (37  C) (Oral)   Resp 16   Wt 126.2 kg (278 lb 3.2 oz)   LMP 08/27/2021 (Exact Date)   SpO2 96%   Breastfeeding No   BMI 44.18 kg/m    BMI= Body mass index is 44.18 kg/m .   GENERAL: healthy, alert and no distress  EYES: Eyes grossly normal to inspection, extraocular movements - intact, and PERRL  HENT: ear canals- normal; TMs- normal; Nose- normal; Mouth- no ulcers, no lesions  NECK: no tenderness, no adenopathy, no asymmetry, no masses, no stiffness; thyroid- normal to palpation  RESP: lungs clear to auscultation - no rales, no rhonchi, no wheezes  BREAST: no masses, no tenderness, no nipple discharge, no palpable axillary masses or adenopathy  CV: regular rates and rhythm, normal S1 S2, no S3 or S4 and no murmur, no click or rub -  ABDOMEN: soft, no tenderness, no  hepatosplenomegaly, no masses, normal bowel sounds  MS: extremities- no gross deformities noted, no edema  SKIN: no suspicious lesions, no rashes  NEURO: strength and tone- normal, sensory exam- grossly normal, mentation- intact, speech- normal, reflexes- symmetric  BACK: no CVA tenderness, no paralumbar tenderness  - female: cervix- normal, adnexae- normal; uterus- normal, no masses, no discharge  RECTAL- female: no masses, no hemorrhoids  PSYCH: Alert and oriented times 3; speech- coherent , normal rate and volume; able to articulate logical thoughts, able to abstract reason, no tangential thoughts, no hallucinations or delusions, affect- flat      Assessment and Plan      Misty was seen today for recheck.    Diagnoses and all orders for this visit:    Encounter for routine adult health examination without abnormal findings    Cervical cancer screening  -     Pap screen with HPV - recommended age 30 - 65 years  -     HPV Hold (Lab Only)        1. Health Care Maintenance: Normal Physical Exam   - pap done today   - labs from recent hospitalization reviewed   - encourage continued engagement with behavioral health for severe depression w/ SI,  discussed emergency plan, pt feels safe staying at group home    Options for treatment and follow-up care were reviewed with the patient . Misty Parham and/or guardian engaged in the decision making process and verbalized understanding of the options discussed and agreed with the final plan.    Alaina De La Cruz, DO

## 2021-09-03 LAB
BKR LAB AP GYN ADEQUACY: NORMAL
BKR LAB AP GYN INTERPRETATION: NORMAL
BKR LAB AP HPV REFLEX: NORMAL
BKR LAB AP LMP: NORMAL
BKR LAB AP PREVIOUS ABNORMAL: NORMAL
PATH REPORT.COMMENTS IMP SPEC: NORMAL
PATH REPORT.RELEVANT HX SPEC: NORMAL

## 2021-09-04 ENCOUNTER — HEALTH MAINTENANCE LETTER (OUTPATIENT)
Age: 36
End: 2021-09-04

## 2021-09-08 LAB
HUMAN PAPILLOMA VIRUS 16 DNA: NEGATIVE
HUMAN PAPILLOMA VIRUS 18 DNA: NEGATIVE
HUMAN PAPILLOMA VIRUS FINAL DIAGNOSIS: NORMAL
HUMAN PAPILLOMA VIRUS OTHER HR: NEGATIVE

## 2021-11-10 ENCOUNTER — VIRTUAL VISIT (OUTPATIENT)
Dept: PHARMACY | Facility: CLINIC | Age: 36
End: 2021-11-10
Payer: COMMERCIAL

## 2021-11-10 ENCOUNTER — VIRTUAL VISIT (OUTPATIENT)
Dept: FAMILY MEDICINE | Facility: CLINIC | Age: 36
End: 2021-11-10
Payer: COMMERCIAL

## 2021-11-10 DIAGNOSIS — F33.2 SEVERE EPISODE OF RECURRENT MAJOR DEPRESSIVE DISORDER, WITHOUT PSYCHOTIC FEATURES (H): Primary | ICD-10-CM

## 2021-11-10 DIAGNOSIS — R45.851 DEPRESSION WITH SUICIDAL IDEATION: Primary | ICD-10-CM

## 2021-11-10 DIAGNOSIS — K59.00 CONSTIPATION, UNSPECIFIED CONSTIPATION TYPE: ICD-10-CM

## 2021-11-10 DIAGNOSIS — F32.A DEPRESSION WITH SUICIDAL IDEATION: Primary | ICD-10-CM

## 2021-11-10 DIAGNOSIS — G47.00 INSOMNIA, UNSPECIFIED TYPE: ICD-10-CM

## 2021-11-10 PROCEDURE — 99213 OFFICE O/P EST LOW 20 MIN: CPT | Mod: 95 | Performed by: FAMILY MEDICINE

## 2021-11-10 PROCEDURE — 99207 PR NO CHARGE LOS: CPT | Performed by: PHARMACIST

## 2021-11-10 NOTE — PROGRESS NOTES
Medication Therapy Management (MTM) Encounter    ASSESSMENT:                            Medication Adherence/Access: No issues identified  Prior to hospitalization Misty has stopped taking her medication.  Since her discharge she reports good medication compliance.      Mental health:  Recent changes to most of Misty's medication prior to hospitalization.  The change to the new regimen has been accepted and well received by Misty.  She has been compliant with current medication and reports no new problems related to the medication regimen.  She has been working with a therapist and discussed her suicidal risk with Dr. De La Cruz today in her appointment prior to our meeting.      No changes recommended at this time.  May recommend taking an extended release Effexor Rx in the future for convenience, but this was not an appropriate time for this medication change.       Constipation  Controlled     Supplement:   Controlled - no other OTC products in use.      Insomnia:  Improved with trazodone.  No change recommended.        PLAN:                            Continue on current therapy - no changes recommended at this time.      Follow-up: with PCP      SUBJECTIVE/OBJECTIVE:                          Misty Parham is a 36 year old female contacted via secure video and then changed to telephone for a TCM visit.  [facility] for depression . Patient is seeing provider after our visit today.     Reason for visit: Hospital follow up.    Allergies/ADRs: Reviewed in chart  Past Medical History: Reviewed in chart  Tobacco: She reports that she has never smoked. She has never used smokeless tobacco.  Alcohol: no discussed       Medication Adherence/Access: no issues reported  Misty knows the names of the medications and is able to review the medications she is taking. She is familiar with the time of medication administration.      Mental health:  Misty was recently hospitalized for depression and  suicidal attempts. Misty was brought to [facility] by a staff member at [facility].  She has been discharged and has returned to [facility].     Currently taking the following medication for Mental Health:  venlafexine  75 mg three times a day  Zyprexa 10 mg in the AM  zyprexa 5 mg with lunch  zyprxa 20 mg at night    These medications where changed while Misty was admitted. She feels that this change has been better then the previous medication regimen.      Constipation  docustate BID daily  mirilax once daily as needed.    No longer taking senna combo.  She feels constipation is controlled.      Supplement:   Vit d3 once day. Denies adverse effects or inconvenience taking this medication.      Insomnia:  Trazadone 100 mg at bedtime  Changed while in the hospital.  This has been an improvement and Misty continues taking this medication and it helps her sleeping.    Continues taking Melatonin for sleep as well.     Today's Vitals:   ----------------  Post Discharge Medication Reconciliation Status: discharge medications reconciled and changed, per note/orders.    I spent 20 minutes with this patient today. Dr. De La Cruz  was provided the recommendations above  in clinic today and is the authorizing prescriber for this visit through the pharmacist collaborative practice agreement.    The patient declined a summary of these recommendations.     Jose Juan Aquino, Pharm.D.    Telemedicine Visit Details  Type of service:  Telephone visit  Start Time:  10:20  End Time:  10:37  Originating Location (patient location): [place]  Distant Location (provider location):  [facility]     Medication Therapy Recommendations  No medication therapy recommendations to display     .

## 2021-11-10 NOTE — PROGRESS NOTES
Family Medicine Video Visit Note  Misty is being evaluated via a billable video visit.      Patient would like the video invitation sent by: Send to e-mail at: [email or web address]     Chief Complaint   Patient presents with     Suicidal     Current Outpatient Medications   Medication Sig Dispense Refill     ARIPiprazole ER (ABILIFY MAINTENA) 400 MG extended release inj syringe Inject 400 mg into the muscle every 30 days       docusate sodium (COLACE) 100 MG capsule Take 100 mg by mouth 2 times daily       hydrOXYzine (ATARAX) 50 MG tablet Take 50 mg by mouth 3 times daily as needed for itching       melatonin 3 MG tablet Take 3 mg by mouth daily as needed       polyethylene glycol (MIRALAX) 17 GM/Dose powder Take 17 g by mouth daily 510 g 0     traZODone (DESYREL) 100 MG tablet Take 100 mg by mouth daily       Vitamin D, Cholecalciferol, 25 MCG (1000 UT) CAPS        artificial saliva (BIOTENE MT) SOLN solution Swish and spit 2 mLs (2 sprays) in mouth 2 times daily 44.3 mL 0     buPROPion (WELLBUTRIN XL) 150 MG 24 hr tablet Take 150 mg by mouth every morning Take along with 300 mg tablet for a total daily dose of 450 mg (Patient not taking: Reported on 9/1/2021)       buPROPion (WELLBUTRIN XL) 300 MG 24 hr tablet Take 1 tablet (300 mg) by mouth daily (Patient not taking: Reported on 9/1/2021) 30 tablet 0     lamoTRIgine (LAMICTAL) 25 MG tablet Take 1 tablet (25 mg) by mouth daily for 11 days, THEN 2 tablets (50 mg) daily for 14 days, THEN 4 tablets (100 mg) daily for 14 days. 95 tablet 0     protriptyline (VIVACTIL) 10 MG tablet Take 1 tablet (10 mg) by mouth At Bedtime (Patient not taking: Reported on 9/1/2021) 30 tablet 0     ramelteon (ROZEREM) 8 MG tablet Take 1 tablet (8 mg) by mouth At Bedtime (Patient not taking: Reported on 9/1/2021) 30 tablet 0     rOPINIRole (REQUIP) 1 MG tablet Take 1 tablet (1 mg) by mouth At Bedtime (Patient not taking: Reported on 9/1/2021) 30 tablet 0     SENNA-docusate sodium (SENNA S)  "8.6-50 MG tablet Take 1 tablet by mouth nightly as needed (constipation) (Patient not taking: Reported on 9/1/2021) 90 tablet 0     venlafaxine (EFFEXOR-XR) 75 MG 24 hr capsule Take 3 capsules (225 mg) by mouth daily (with breakfast) 90 capsule 0     No Known Allergies           HPI     Video Start Time: 10:01 AM    Misty presents to clinic today for the following health issues:    Was in the hospital at [facility] - psych admission for ~3 weeks for suicide attempt  Had stopped taking medication (refused them at living facility), tried suicide by overdose on OTC cough medicine - had ordered it for delivery from [employer]  Psych medications were adjusted during admission - xyprexa, venlafaxine, ramelteon started, stopped seroquel  Now compliant with medications since discharge  Reports she does feel better on meds, 'voices' telling her to commit suicide are under control - still having a lot of depression. Still sometimes feels suicidal but thinks she would tell staff at her facility if her thoughts get worse.  Has been talking with therapist, which is going ok since the hospital  Has been doing '[employer]' for a hobby and enjoys that  Has an appointment on 11/22 with psychiatry            Review of Systems:     Constitutional, HEENT, cardiovascular, pulmonary, gi and gu systems are negative, except as otherwise noted.         Physical Exam:     There were no vitals taken for this visit.  Estimated body mass index is 44.18 kg/m  as calculated from the following:    Height as of 6/25/21: 1.69 m (5' 6.54\").    Weight as of 9/1/21: 126.2 kg (278 lb 3.2 oz).    GENERAL: healthy, alert and no distress  RESP: No respiratory distress, increased WOB, or cough  PSYCH: mentation appears normal, affect flat and appearance well groomed          Assessment and Plan       ICD-10-CM    1. Depression with suicidal ideation  F32.A     R45.851      - doing much better since hospitalization  - reports decreased auditory " hallucinations  - reports therapy is going better  - has f/u with psych on 11/22  - discussed safety plan - notify staff of intrusive suicidal ideation, stressed adherence to medication  - participating in hobbies more actively, trying to talk with her children when possible    After Visit Information:  Patient declined AVS   No follow-ups on file.      Video-Visit Details    Type of service:  Video Visit    Video End Time (time video stopped): 10:16    Originating Location (pt. Location): Assisted Living    Distant Location (provider location):  [facility]     Mode of Communication:  Video Conference via [employer]AK[employer]

## 2021-11-11 ASSESSMENT — PATIENT HEALTH QUESTIONNAIRE - PHQ9: SUM OF ALL RESPONSES TO PHQ QUESTIONS 1-9: 23

## 2021-11-28 RX ORDER — OLANZAPINE 10 MG/1
10 TABLET ORAL
COMMUNITY
Start: 2021-10-26 | End: 2024-02-02

## 2021-11-28 RX ORDER — OLANZAPINE 20 MG/1
20 TABLET ORAL
COMMUNITY
Start: 2021-10-26 | End: 2024-02-02

## 2021-11-28 RX ORDER — OLANZAPINE 5 MG/1
5 TABLET ORAL
COMMUNITY
Start: 2021-10-26 | End: 2024-02-02

## 2022-01-01 NOTE — PLAN OF CARE
Preventive Care Visit  [facility]  EVANS Arevalo CNP, Pediatrics  Sep 29, 2022    Assessment & Plan   6 month old, here for preventive care.    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   Offered Covid-19 and influenza vaccinations which mother declined    Anticipatory Guidance    Reviewed age appropriate anticipatory guidance.     reading to child    Reach Out & Read--book given    music    advancement of solid foods    cup    breastfeeding or formula for 1 year    sleep patterns    teething/ dental care    childproof home    car seat    avoid choke foods    Referrals/Ongoing Specialty Care  None  Verbal Dental Referral: No teeth yet  Dental Fluoride Varnish: No, no teeth yet.    Follow Up      Return in about 3 months (around 2022) for Preventive Care visit.    Subjective   Nasal congestion started yesterday evening.  Mild cough this morning.  Still feeding ok.  Pulling on right ear.      Additional Questions 2022   Accompanied by Mother-Irene   Questions for today's visit Yes   Questions Pulling on ears and mild cold symptoms   Surgery, major illness, or injury since last physical No     [place]  Depression Scale (EPDS) Risk Assessment: Completed [place]    Social 2022   Lives with Parent(s), Grandparent(s), Sibling(s)   Who takes care of your child? Parent(s)  Pt signed documents for the state allowing  to take their son out of the country for a trip. Documents were notarized and delivered to .          Recent potential stressors None   History of trauma No   Family Hx mental health challenges No   Lack of transportation has limited access to appts/meds No   Difficulty paying mortgage/rent on time No   Lack of steady place to sleep/has slept in a shelter No     Health Risks/Safety 2022   What type of car seat does your child use?  Infant car seat   Is your child's car seat forward or rear facing? Rear facing   Where does your child sit in the car?  Back seat   Are stairs gated at home? Yes   Do you use space heaters, wood stove, or a fireplace in your home? No   Are poisons/cleaning supplies and medications kept out of reach? Yes   Do you have guns/firearms in the home? (!) YES   Are the guns/firearms secured in a safe or with a trigger lock? Yes   Is ammunition stored separately from guns? Yes     TB Screening 2022   Was your child born outside of the [place]? No     TB Screening: Consider immunosuppression as a risk factor for TB 2022   Recent TB infection or positive TB test in family/close contacts No   Recent travel outside [place] (child/family/close contacts) No   Recent residence in high-risk group setting (correctional facility/health care facility/homeless shelter/refugee camp) No      Dental Screening 2022   Have parents/caregivers/siblings had cavities in the last 2 years? (!) YES, IN THE LAST 7-23 MONTHS- MODERATE RISK     Diet 2022   Do you have questions about feeding your baby? No   What does your baby eat? Formula, Baby food/Pureed food   Formula type Similac advance   How does your baby eat? Bottle   How often does baby eat? -   Vitamin or supplement use None   In past 12 months, concerned food might run out Never true   In past 12 months, food has run out/couldn't afford more Never true     Elimination 2022   Bowel or bladder concerns? No concerns     Media Use 2022   Hours per day of screen time (for entertainment) 30 minutes maybe     Sleep 2022   Do  "you have any concerns about your child's sleep? No concerns, regular bedtime routine and sleeps well through the night   Where does your baby sleep? Marisol Bowen   In what position does your baby sleep? Back     Vision/Hearing 2022   Vision or hearing concerns No concerns     Development/ Social-Emotional Screen 2022   Does your child receive any special services? No     Development  Screening too used, reviewed with parent or guardian: No screening tool used  Milestones (by observation/ exam/ report) 75-90% ile  PERSONAL/ SOCIAL/COGNITIVE:    Turns from strangers    Reaches for familiar people    Looks for objects when out of sight  LANGUAGE:    Laughs/ Squeals    Turns to voice/ name    Babbles  GROSS MOTOR:    Rolling    Pull to sit-no head lag    Sit with support  FINE MOTOR/ ADAPTIVE:    Puts objects in mouth    Raking grasp    Transfers hand to hand - sometimes         Objective     Exam  Pulse 151   Temp 100.5  F (38.1  C) (Rectal)   Resp 30   Ht 2' 3.24\" (0.692 m)   Wt 18 lb 4.5 oz (8.292 kg)   HC 17.28\" (43.9 cm)   SpO2 97%   BMI 17.32 kg/m    64 %ile (Z= 0.35) based on WHO (Boys, 0-2 years) head circumference-for-age based on Head Circumference recorded on 2022.  62 %ile (Z= 0.32) based on WHO (Boys, 0-2 years) weight-for-age data using vitals from 2022.  72 %ile (Z= 0.58) based on WHO (Boys, 0-2 years) Length-for-age data based on Length recorded on 2022.  53 %ile (Z= 0.08) based on WHO (Boys, 0-2 years) weight-for-recumbent length data based on body measurements available as of 2022.    Physical Exam  GENERAL: Active, alert, in no acute distress.  SKIN: Clear. No significant rash, abnormal pigmentation or lesions  HEAD: Normocephalic. Normal fontanels and sutures.  EYES: Conjunctivae and cornea normal. Red reflexes present bilaterally.  EARS: Normal canals. Tympanic membranes are normal; gray and translucent.  NOSE: congested  MOUTH/THROAT: Clear. No oral " lesions.  NECK: Supple, no masses.  LYMPH NODES: No adenopathy  LUNGS: Clear. No rales, rhonchi, wheezing or retractions  HEART: Regular rhythm. Normal S1/S2. No murmurs. Normal femoral pulses.  ABDOMEN: Soft, non-tender, not distended, no masses or hepatosplenomegaly. Normal umbilicus and bowel sounds.   GENITALIA: Normal male external genitalia - penis is mostly buried in fat pad - easily retracted. Baljeet stage I,  Testes descended bilaterally, no hernia or hydrocele.    EXTREMITIES: Hips normal with negative Ortolani and Frost. Symmetric creases and  no deformities  NEUROLOGIC: Normal tone throughout. Normal reflexes for age      EVANS Arevalo CNP  [facility]

## 2022-01-26 NOTE — ANESTHESIA POSTPROCEDURE EVALUATION
Patient: Misty Parham    ECT    Diagnosis:* No pre-op diagnosis entered *  Diagnosis Additional Information: No value filed.    Anesthesia Type:  General    Note:  Anesthesia Post Evaluation    Patient location during evaluation: PACU  Patient participation: Able to fully participate in evaluation  Level of consciousness: awake  Pain management: adequate  Airway patency: patent  Cardiovascular status: acceptable  Respiratory status: acceptable  Hydration status: acceptable  PONV: none             Last vitals:  Vitals:    10/17/19 0700 10/17/19 1559 10/18/19 0555   BP: 103/70 117/68 115/78   Pulse: 72 76    Resp: 15 16 16   Temp: 36.7  C (98.1  F) 36.5  C (97.7  F) 36.4  C (97.5  F)   SpO2: 97% 100% 97%         Electronically Signed By: Tucker Cornelius MD  October 18, 2019  7:16 AM  
AMOS SALAZAR ; 02/25/2022 ; [facility] [address]  AMOS SALAZAR ; 02/25/2022 ; [facility] [address]

## 2022-04-11 ENCOUNTER — DOCUMENTATION ONLY (OUTPATIENT)
Dept: FAMILY MEDICINE | Facility: CLINIC | Age: 37
End: 2022-04-11

## 2022-04-11 ENCOUNTER — OFFICE VISIT (OUTPATIENT)
Dept: FAMILY MEDICINE | Facility: CLINIC | Age: 37
End: 2022-04-11
Payer: COMMERCIAL

## 2022-04-11 ENCOUNTER — MEDICAL CORRESPONDENCE (OUTPATIENT)
Dept: HEALTH INFORMATION MANAGEMENT | Facility: CLINIC | Age: 37
End: 2022-04-11

## 2022-04-11 VITALS
TEMPERATURE: 98.6 F | OXYGEN SATURATION: 95 % | BODY MASS INDEX: 45.8 KG/M2 | HEIGHT: 66 IN | HEART RATE: 105 BPM | RESPIRATION RATE: 16 BRPM | DIASTOLIC BLOOD PRESSURE: 81 MMHG | WEIGHT: 285 LBS | SYSTOLIC BLOOD PRESSURE: 112 MMHG

## 2022-04-11 DIAGNOSIS — R45.851 DEPRESSION WITH SUICIDAL IDEATION: ICD-10-CM

## 2022-04-11 DIAGNOSIS — F32.3 MAJOR DEPRESSIVE DISORDER, SINGLE EPISODE, SEVERE WITH PSYCHOTIC FEATURES (H): ICD-10-CM

## 2022-04-11 DIAGNOSIS — Z01.818 PREOP GENERAL PHYSICAL EXAM: ICD-10-CM

## 2022-04-11 DIAGNOSIS — F32.A DEPRESSION WITH SUICIDAL IDEATION: ICD-10-CM

## 2022-04-11 PROCEDURE — 99204 OFFICE O/P NEW MOD 45 MIN: CPT | Mod: GC | Performed by: STUDENT IN AN ORGANIZED HEALTH CARE EDUCATION/TRAINING PROGRAM

## 2022-04-11 RX ORDER — METFORMIN HCL 500 MG
500 TABLET, EXTENDED RELEASE 24 HR ORAL
COMMUNITY
Start: 2022-03-23 | End: 2023-08-23

## 2022-04-11 RX ORDER — ARIPIPRAZOLE 5 MG/1
1 TABLET ORAL DAILY
COMMUNITY
Start: 2022-02-23 | End: 2023-08-23

## 2022-04-11 ASSESSMENT — PATIENT HEALTH QUESTIONNAIRE - PHQ9: SUM OF ALL RESPONSES TO PHQ QUESTIONS 1-9: 23

## 2022-04-11 NOTE — PROGRESS NOTES
Preceptor Attestation:    I discussed the patient with the resident and evaluated the patient in person. I have verified the content of the note, which accurately reflects my assessment of the patient and the plan of care.   Supervising Physician:  Samra Harrison MD.

## 2022-04-11 NOTE — PROGRESS NOTES
"When opening a documentation only encounter, be sure to enter in \"Chief Complaint\" Forms and in \" Comments\" Title of form, description if needed.    Misty is a 36 year old  female  Form received via: Appointment  Form now resides in: Provider Ready    Elisha Oliva CMA      Form has been completed by provider.     Form sent out via: Patient took original form with her home and a copy was made and placed in the scan basket. Preop was also printed off and given to the patient to take home per the form request not to fax it.   Patient informed: Yes  Output date: April 11, 2022    Elisha Oliva CMA      **Please close the encounter**      "

## 2022-04-11 NOTE — PATIENT INSTRUCTIONS
Structure and Support and Therapy       1) Places           crisis housing-[facility]la [facility]in           [facility] (intensive residential treatment services)  AND   group home           day programs- [facility],  [facility],  [facility]           DBT, CBT, groups           [facility]  [phone]  (various therapies/programming)                           [email or web address]           SEED (Supportive Experiential Education)- [phone];  [email or web address]                         Website:"[email or web address][email or web address] (see Adult Special Needs)  Preparing for Your Surgery  Getting started  A nurse will call you to review your health history and instructions. They will give you an arrival time based on your scheduled surgery time. Please be ready to share:  Your doctor's clinic name and phone number  Your medical, surgical and anesthesia history  A list of allergies and sensitivities  A list of medicines, including herbal treatments and over-the-counter drugs  Whether the patient has a legal guardian (ask how to send us the papers in advance)  Please tell us if you're pregnant--or if there's any chance you might be pregnant. Some surgeries may injure a fetus (unborn baby), so they require a pregnancy test. Surgeries that are safe for a fetus don't always need a test, and you can choose whether to have one.   If you have a child who's having surgery, please ask for a copy of Preparing for Your Child's Surgery.    Preparing for surgery  Within 30 days of surgery: Have a pre-op exam (sometimes called an H&P, or History and Physical). This can be done at a clinic or pre-operative center.  If you're having a , you may not need this exam. Talk to your care team.  At your pre-op exam, talk to your care team about all medicines you take. If you need to stop any medicines before surgery, ask when to start taking them again.  We do this for your safety. Many medicines can make you  bleed too much during surgery. Some change how well surgery (anesthesia) drugs work.  Call your insurance company to let them know you're having surgery. (If you don't have insurance, call [phone].)  Call your clinic if there's any change in your health. This includes signs of a cold or flu (sore throat, runny nose, cough, rash, fever). It also includes a scrape or scratch near the surgery site.  If you have questions on the day of surgery, call your hospital or surgery center.  COVID testing  You may need to be tested for COVID-19 before having surgery. If so, your surgical team will give you instructions for scheduling this test, separate from your preoperative history and physical.  Eating and drinking guidelines  For your safety: Unless your surgeon tells you otherwise, follow the guidelines below.  Eat and drink as usual until 8 hours before surgery. After that, no food or milk.  Drink clear liquids until 2 hours before surgery. These are liquids you can see through, like water, Gatorade and Propel Water. You may also have black coffee and tea (no cream or milk).  Nothing by mouth within 2 hours of surgery. This includes gum, candy and breath mints.  If you drink alcohol: Stop drinking it the night before surgery.  If your care team tells you to take medicine on the morning of surgery, it's okay to take it with a sip of water.  Preventing infection  Shower or bathe the night before and morning of your surgery. Follow the instructions your clinic gave you. (If no instructions, use regular soap.)  Don't shave or clip hair near your surgery site. We'll remove the hair if needed.  Don't smoke or vape the morning of surgery. You may chew nicotine gum up to 2 hours before surgery. A nicotine patch is okay.  Note: Some surgeries require you to completely quit smoking and nicotine. Check with your surgeon.  Your care team will make every effort to keep you safe from infection. We will:  Clean our hands often with  soap and water (or an alcohol-based hand rub).  Clean the skin at your surgery site with a special soap that kills germs.  Give you a special gown to keep you warm. (Cold raises the risk of infection.)  Wear special hair covers, masks, gowns and gloves during surgery.  Give antibiotic medicine, if prescribed. Not all surgeries need antibiotics.  What to bring on the day of surgery  Photo ID and insurance card  Copy of your health care directive, if you have one  Glasses and hearing aides (bring cases)  You can't wear contacts during surgery  Inhaler and eye drops, if you use them (tell us about these when you arrive)  CPAP machine or breathing device, if you use them  A few personal items, if spending the night  If you have . . .  A pacemaker, ICD (cardiac defibrillator) or other implant: Bring the ID card.  An implanted stimulator: Bring the remote control.  A legal guardian: Bring a copy of the certified (court-stamped) guardianship papers.  Please remove any jewelry, including body piercings. Leave jewelry and other valuables at home.  If you're going home the day of surgery  You must have a responsible adult drive you home. They should stay with you overnight as well.  If you don't have someone to stay with you, and you aren't safe to go home alone, we may keep you overnight. Insurance often won't pay for this.  After surgery  If it's hard to control your pain or you need more pain medicine, please call your surgeon's office.  Questions?   If you have any questions for your care team, list them here: _________________________________________________________________________________________________________________________________________________________________________ ____________________________________ ____________________________________ ____________________________________  For informational purposes only. Not to replace the advice of your health care provider. Copyright   2003, 2019 [facility].  All rights reserved. Clinically reviewed by Naomi Reed MD. Okta [id number] - REV .    Preparing for Your Surgery  Getting started  A nurse will call you to review your health history and instructions. They will give you an arrival time based on your scheduled surgery time. Please be ready to share:  Your doctor's clinic name and phone number  Your medical, surgical and anesthesia history  A list of allergies and sensitivities  A list of medicines, including herbal treatments and over-the-counter drugs  Whether the patient has a legal guardian (ask how to send us the papers in advance)  Please tell us if you're pregnant--or if there's any chance you might be pregnant. Some surgeries may injure a fetus (unborn baby), so they require a pregnancy test. Surgeries that are safe for a fetus don't always need a test, and you can choose whether to have one.   If you have a child who's having surgery, please ask for a copy of Preparing for Your Child's Surgery.    Preparing for surgery  Within 30 days of surgery: Have a pre-op exam (sometimes called an H&P, or History and Physical). This can be done at a clinic or pre-operative center.  If you're having a , you may not need this exam. Talk to your care team.  At your pre-op exam, talk to your care team about all medicines you take. If you need to stop any medicines before surgery, ask when to start taking them again.  We do this for your safety. Many medicines can make you bleed too much during surgery. Some change how well surgery (anesthesia) drugs work.  Call your insurance company to let them know you're having surgery. (If you don't have insurance, call [phone].)  Call your clinic if there's any change in your health. This includes signs of a cold or flu (sore throat, runny nose, cough, rash, fever). It also includes a scrape or scratch near the surgery site.  If you have questions on the day of surgery, call your hospital or surgery center.  COVID  testing  You may need to be tested for COVID-19 before having surgery. If so, your surgical team will give you instructions for scheduling this test, separate from your preoperative history and physical.  Eating and drinking guidelines  For your safety: Unless your surgeon tells you otherwise, follow the guidelines below.  Eat and drink as usual until 8 hours before surgery. After that, no food or milk.  Drink clear liquids until 2 hours before surgery. These are liquids you can see through, like water, Gatorade and Propel Water. You may also have black coffee and tea (no cream or milk).  Nothing by mouth within 2 hours of surgery. This includes gum, candy and breath mints.  If you drink alcohol: Stop drinking it the night before surgery.  If your care team tells you to take medicine on the morning of surgery, it's okay to take it with a sip of water.  Preventing infection  Shower or bathe the night before and morning of your surgery. Follow the instructions your clinic gave you. (If no instructions, use regular soap.)  Don't shave or clip hair near your surgery site. We'll remove the hair if needed.  Don't smoke or vape the morning of surgery. You may chew nicotine gum up to 2 hours before surgery. A nicotine patch is okay.  Note: Some surgeries require you to completely quit smoking and nicotine. Check with your surgeon.  Your care team will make every effort to keep you safe from infection. We will:  Clean our hands often with soap and water (or an alcohol-based hand rub).  Clean the skin at your surgery site with a special soap that kills germs.  Give you a special gown to keep you warm. (Cold raises the risk of infection.)  Wear special hair covers, masks, gowns and gloves during surgery.  Give antibiotic medicine, if prescribed. Not all surgeries need antibiotics.  What to bring on the day of surgery  Photo ID and insurance card  Copy of your health care directive, if you have one  Glasses and hearing aides  (bring cases)  You can't wear contacts during surgery  Inhaler and eye drops, if you use them (tell us about these when you arrive)  CPAP machine or breathing device, if you use them  A few personal items, if spending the night  If you have . . .  A pacemaker, ICD (cardiac defibrillator) or other implant: Bring the ID card.  An implanted stimulator: Bring the remote control.  A legal guardian: Bring a copy of the certified (court-stamped) guardianship papers.  Please remove any jewelry, including body piercings. Leave jewelry and other valuables at home.  If you're going home the day of surgery  You must have a responsible adult drive you home. They should stay with you overnight as well.  If you don't have someone to stay with you, and you aren't safe to go home alone, we may keep you overnight. Insurance often won't pay for this.  After surgery  If it's hard to control your pain or you need more pain medicine, please call your surgeon's office.  Questions?   If you have any questions for your care team, list them here: _________________________________________________________________________________________________________________________________________________________________________ ____________________________________ ____________________________________ ____________________________________  For informational purposes only. Not to replace the advice of your health care provider. Copyright   2003, 2019 [facility]. All rights reserved. Clinically reviewed by Naomi Reed MD. [place]ide[place] [id number] - REV 07/21.    How to Take Your Medication Before Surgery  - Take all of your medications before surgery as usual
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
## 2022-06-16 NOTE — PLAN OF CARE
Problem: Patient Care Overview  Goal: Team Discussion  Team Plan:   Outcome: Improving  BEHAVIORAL TEAM DISCUSSION    Participants: Dr. Madrigal, case management, nursing staff, psych associates  Progress: Patient is improving, feeling better. Started on Trazodone, and Zoloft. Possibly discharging tomorrow to [facility].    Continued Stay Criteria/Rationale: patient is improving.   Medical/Physical: n/a  Precautions: n/a  Behavioral Orders   Procedures     Code 1 - Restrict to Unit     Routine Programming     As clinically indicated     Status 15     Every 15 minutes.     Plan: Possible discharge tomorrow to [facility].   Rationale for change in precautions or plan: Patient is improving.          [FreeTextEntry1] : s/p Thyroidectomy\par path discussed\par daily care\par blood work in office\par f/u 6 weeks

## 2022-08-15 ENCOUNTER — OFFICE VISIT (OUTPATIENT)
Dept: FAMILY MEDICINE | Facility: CLINIC | Age: 37
End: 2022-08-15
Payer: COMMERCIAL

## 2022-08-15 VITALS
OXYGEN SATURATION: 97 % | HEIGHT: 66 IN | DIASTOLIC BLOOD PRESSURE: 88 MMHG | TEMPERATURE: 98.2 F | BODY MASS INDEX: 45.22 KG/M2 | WEIGHT: 281.4 LBS | SYSTOLIC BLOOD PRESSURE: 119 MMHG | HEART RATE: 83 BPM

## 2022-08-15 DIAGNOSIS — Z00.00 ENCOUNTER FOR ROUTINE ADULT HEALTH EXAMINATION WITHOUT ABNORMAL FINDINGS: Primary | ICD-10-CM

## 2022-08-15 LAB
ALBUMIN SERPL BCG-MCNC: 4.4 G/DL (ref 3.5–5.2)
ALP SERPL-CCNC: 93 U/L (ref 35–104)
ALT SERPL W P-5'-P-CCNC: 22 U/L (ref 10–35)
ANION GAP SERPL CALCULATED.3IONS-SCNC: 9 MMOL/L (ref 7–15)
AST SERPL W P-5'-P-CCNC: 26 U/L (ref 10–35)
BILIRUB SERPL-MCNC: 0.2 MG/DL
BUN SERPL-MCNC: 9 MG/DL (ref 6–20)
CALCIUM SERPL-MCNC: 9.9 MG/DL (ref 8.6–10)
CHLORIDE SERPL-SCNC: 104 MMOL/L (ref 98–107)
CHOLEST SERPL-MCNC: 181 MG/DL
CREAT SERPL-MCNC: 0.89 MG/DL (ref 0.51–0.95)
DEPRECATED HCO3 PLAS-SCNC: 28 MMOL/L (ref 22–29)
GFR SERPL CREATININE-BSD FRML MDRD: 84 ML/MIN/1.73M2
GLUCOSE SERPL-MCNC: 90 MG/DL (ref 70–99)
HBA1C MFR BLD: 5.2 % (ref 0–5.6)
HDLC SERPL-MCNC: 42 MG/DL
LDLC SERPL CALC-MCNC: 103 MG/DL
NONHDLC SERPL-MCNC: 139 MG/DL
POTASSIUM SERPL-SCNC: 4.4 MMOL/L (ref 3.4–5.3)
PROT SERPL-MCNC: 7.7 G/DL (ref 6.4–8.3)
SODIUM SERPL-SCNC: 141 MMOL/L (ref 136–145)
TRIGL SERPL-MCNC: 179 MG/DL

## 2022-08-15 PROCEDURE — 80053 COMPREHEN METABOLIC PANEL: CPT | Performed by: FAMILY MEDICINE

## 2022-08-15 PROCEDURE — 80061 LIPID PANEL: CPT | Performed by: FAMILY MEDICINE

## 2022-08-15 PROCEDURE — 83036 HEMOGLOBIN GLYCOSYLATED A1C: CPT | Performed by: FAMILY MEDICINE

## 2022-08-15 PROCEDURE — 36415 COLL VENOUS BLD VENIPUNCTURE: CPT | Performed by: FAMILY MEDICINE

## 2022-08-15 PROCEDURE — 99395 PREV VISIT EST AGE 18-39: CPT | Performed by: FAMILY MEDICINE

## 2022-08-15 ASSESSMENT — ENCOUNTER SYMPTOMS
DIARRHEA: 1
DIZZINESS: 1
NAUSEA: 0
ARTHRALGIAS: 0
PARESTHESIAS: 0
NERVOUS/ANXIOUS: 1
HEADACHES: 1
CONSTIPATION: 0
SORE THROAT: 0
COUGH: 0
DYSURIA: 0
FEVER: 0
SHORTNESS OF BREATH: 0
HEMATOCHEZIA: 0
EYE PAIN: 0
HEARTBURN: 0
PALPITATIONS: 0
MYALGIAS: 0
ABDOMINAL PAIN: 0
JOINT SWELLING: 0
WEAKNESS: 1
HEMATURIA: 0
BREAST MASS: 0
FREQUENCY: 0
CHILLS: 1

## 2022-08-15 ASSESSMENT — PATIENT HEALTH QUESTIONNAIRE - PHQ9
SUM OF ALL RESPONSES TO PHQ QUESTIONS 1-9: 22
SUM OF ALL RESPONSES TO PHQ QUESTIONS 1-9: 22
10. IF YOU CHECKED OFF ANY PROBLEMS, HOW DIFFICULT HAVE THESE PROBLEMS MADE IT FOR YOU TO DO YOUR WORK, TAKE CARE OF THINGS AT HOME, OR GET ALONG WITH OTHER PEOPLE: EXTREMELY DIFFICULT

## 2022-08-15 NOTE — PROGRESS NOTES
SUBJECTIVE:   CC: Misty Parham is an 37 year old woman who presents for preventive health visit.     Patient has been advised of split billing requirements and indicates understanding: Yes  Healthy Habits:     Getting at least 3 servings of Calcium per day:  NO    Bi-annual eye exam:  NO    Dental care twice a year:  NO    Sleep apnea or symptoms of sleep apnea:  Daytime drowsiness    Diet:  Other    Frequency of exercise:  1 day/week    Duration of exercise:  15-30 minutes    Taking medications regularly:  Yes    Medication side effects:  None    PHQ-2 Total Score: 4    Additional concerns today:  No  Mental Health Problem  Associated symptoms include chills, headaches and weakness. Pertinent negatives include no abdominal pain, arthralgias, chest pain, congestion, coughing, fever, joint swelling, myalgias, nausea, rash or sore throat.       Today's PHQ-2 Score:   PHQ-2 ( 1999 [employer]) 8/15/2022   Q1: Little interest or pleasure in doing things 2   Q2: Feeling down, depressed or hopeless 3   PHQ-2 Score 5   PHQ-2 Total Score (12-17 Years)- Positive if 3 or more points; Administer PHQ-A if positive -   Q1: Little interest or pleasure in doing things More than half the days   Q2: Feeling down, depressed or hopeless Nearly every day   PHQ-2 Score 5     [facility]   Sees psych and therapist consistently  Chronic suicidality with multiple past attempts at suicide, but emergency plan is set in current living situation and patient feels as safe as possible        Social History     Tobacco Use     Smoking status: Never Smoker     Smokeless tobacco: Never Used   Substance Use Topics     Alcohol use: Not Currently     If you drink alcohol do you typically have >3 drinks per day or >7 drinks per week? No    Alcohol Use 8/15/2022   Prescreen: >3 drinks/day or >7 drinks/week? No   No flowsheet data found.    Reviewed orders with patient.  Reviewed health maintenance and updated orders accordingly -  "Yes    Breast Cancer Screening:  Any new diagnosis of family breast, ovarian, or bowel cancer? No    FHS-7: No flowsheet data found.  click delete button to remove this line now  Patient under 40 years of age: Routine Mammogram Screening not recommended.   Pertinent mammograms are reviewed under the imaging tab.    History of abnormal Pap smear: NO - age 30-65 PAP every 5 years with negative HPV co-testing recommended  PAP / HPV Latest Ref Rng & Units 9/1/2021   PAP   Negative for Intraepithelial Lesion or Malignancy (NILM)   HPV16 Negative Negative   HPV18 Negative Negative   HRHPV Negative Negative     Reviewed and updated as needed this visit by clinical staff    Allergies  Meds                Reviewed and updated as needed this visit by Provider                       Review of Systems   Constitutional: Positive for chills. Negative for fever.   HENT: Negative for congestion, ear pain, hearing loss and sore throat.    Eyes: Negative for pain and visual disturbance.   Respiratory: Negative for cough and shortness of breath.    Cardiovascular: Negative for chest pain, palpitations and peripheral edema.   Gastrointestinal: Positive for diarrhea. Negative for abdominal pain, constipation, heartburn, hematochezia and nausea.   Breasts:  Negative for tenderness, breast mass and discharge.   Genitourinary: Negative for dysuria, frequency, genital sores, hematuria, pelvic pain, urgency, vaginal bleeding and vaginal discharge.   Musculoskeletal: Negative for arthralgias, joint swelling and myalgias.   Skin: Negative for rash.   Neurological: Positive for dizziness, weakness and headaches. Negative for paresthesias.   Psychiatric/Behavioral: Positive for mood changes. The patient is nervous/anxious.      OBJECTIVE:   /88   Pulse 83   Temp 98.2  F (36.8  C) (Oral)   Ht 1.676 m (5' 5.98\")   Wt 127.6 kg (281 lb 6.4 oz)   SpO2 97%   BMI 45.44 kg/m    Physical Exam  GENERAL: healthy, alert and no distress  EYES: " "Eyes grossly normal to inspection, PERRL and conjunctivae and sclerae normal  HENT: ear canals and TM's normal, nose and mouth without ulcers or lesions  NECK: no adenopathy, no asymmetry, masses, or scars and thyroid normal to palpation  RESP: lungs clear to auscultation - no rales, rhonchi or wheezes  CV: regular rate and rhythm, normal S1 S2, no S3 or S4, no murmur, click or rub, no peripheral edema and peripheral pulses strong  ABDOMEN: soft, nontender, no hepatosplenomegaly, no masses and bowel sounds normal  MS: no gross musculoskeletal defects noted, no edema  SKIN: no suspicious lesions or rashes  PSYCH: mentation appears normal and judgement and insight intact, affect blunted    ASSESSMENT/PLAN:       ICD-10-CM    1. Encounter for routine adult health examination without abnormal findings  Z00.00 Lipid panel     Hemoglobin A1c     Comprehensive metabolic panel     Lipid panel     Hemoglobin A1c     Comprehensive metabolic panel     Psychiatric treatment managed at [facility]  Discussed health diet, increase fiber to help with GI upset  Discussed weakness, dizziness which may be related to decreased PO intake recently  Encouraged exercise when possible  Pap UTD  Routine screening labs ordered    COUNSELING:  Reviewed preventive health counseling, as reflected in patient instructions  Special attention given to:        Regular exercise       Healthy diet/nutrition       Consider Hep C screening for all patients one time for ages 18-79 years    Estimated body mass index is 45.44 kg/m  as calculated from the following:    Height as of this encounter: 1.676 m (5' 5.98\").    Weight as of this encounter: 127.6 kg (281 lb 6.4 oz).      She reports that she has never smoked. She has never used smokeless tobacco.      Counseling Resources:  ATP IV Guidelines  Pooled Cohorts Equation Calculator  Breast Cancer Risk Calculator  BRCA-Related Cancer Risk Assessment: FHS-7 Tool  FRAX Risk Assessment  ICSI Preventive " Guidelines  Dietary Guidelines for Americans, 2010  USDA's MyPlate  ASA Prophylaxis  Lung CA Screening    Alaina De La Cruz DO  [facility]  Answers for HPI/ROS submitted by the patient on 8/15/2022  If you checked off any problems, how difficult have these problems made it for you to do your work, take care of things at home, or get along with other people?: Extremely difficult  PHQ9 TOTAL SCORE: 22

## 2022-10-16 ENCOUNTER — HEALTH MAINTENANCE LETTER (OUTPATIENT)
Age: 37
End: 2022-10-16

## 2023-01-24 NOTE — PLAN OF CARE
BEHAVIORAL TEAM DISCUSSION    Participants: Hina Morris, KARLOS; Sarah Evangelista and Virgie Horvath, CTC s; and RN  Progress: 35-year-old female who was returned to station  32 after being admitted to  as a transfer from [facility]  inpatient psychiatry for IV antibiotics     Initial admit was on 2/10/21.    Anticipated length of stay: TBD  Continued Stay Criteria/Rationale: She is on a full commitment with [facility] which expires in May. A petition for recommitment was submitted on 4/6.  Medical/Physical: pt stable and returned to station 32  Precautions:   Behavioral Orders   Procedures     Code 1 - Restrict to Unit     Petition for commitment     Recommitment - MI with [facility] in [facility]     Routine Programming     As clinically indicated     Self Injury Precaution     Single Room     Status 15     Every 15 minutes.     Suicide precautions     Patients on Suicide Precautions should have a Combination Diet ordered that includes a Diet selection(s) AND a Behavioral Tray selection for Safe Tray - with utensils, or Safe Tray - NO utensils       Plan: contact the court regarding status of recommitment is needed.  Rationale for change in precautions or plan: see above       (2) cough or sneeze

## 2023-05-16 NOTE — PLAN OF CARE
"Pt mood is calm with flat blunted affect. Insight is impaired. Visible first half of shift, sitting quietly in lounge with other pts. Attended 2 groups. Pt was in bed for latter half of shift. Says she feels \"pretty good,\" denies suicidal ideation when asked.   " Jonathan Romeo PA  [facility] Clinical Pool 1 minute ago (11:15 AM)     No, thanks!

## 2023-08-23 ENCOUNTER — OFFICE VISIT (OUTPATIENT)
Dept: FAMILY MEDICINE | Facility: CLINIC | Age: 38
End: 2023-08-23
Payer: MEDICARE

## 2023-08-23 VITALS
OXYGEN SATURATION: 96 % | DIASTOLIC BLOOD PRESSURE: 82 MMHG | BODY MASS INDEX: 45.99 KG/M2 | WEIGHT: 293 LBS | RESPIRATION RATE: 17 BRPM | HEIGHT: 67 IN | TEMPERATURE: 98 F | SYSTOLIC BLOOD PRESSURE: 116 MMHG | HEART RATE: 89 BPM

## 2023-08-23 DIAGNOSIS — F51.04 CHRONIC INSOMNIA: ICD-10-CM

## 2023-08-23 DIAGNOSIS — F41.1 GENERALIZED ANXIETY DISORDER: ICD-10-CM

## 2023-08-23 DIAGNOSIS — R06.83 SNORING: ICD-10-CM

## 2023-08-23 DIAGNOSIS — Z11.4 SCREENING FOR HIV (HUMAN IMMUNODEFICIENCY VIRUS): ICD-10-CM

## 2023-08-23 DIAGNOSIS — Z13.220 ENCOUNTER FOR LIPID SCREENING FOR CARDIOVASCULAR DISEASE: ICD-10-CM

## 2023-08-23 DIAGNOSIS — F33.2 SEVERE EPISODE OF RECURRENT MAJOR DEPRESSIVE DISORDER, WITHOUT PSYCHOTIC FEATURES (H): ICD-10-CM

## 2023-08-23 DIAGNOSIS — Z00.00 ROUTINE GENERAL MEDICAL EXAMINATION AT A HEALTH CARE FACILITY: Primary | ICD-10-CM

## 2023-08-23 DIAGNOSIS — R45.851 DEPRESSION WITH SUICIDAL IDEATION: ICD-10-CM

## 2023-08-23 DIAGNOSIS — R53.83 FATIGUE, UNSPECIFIED TYPE: ICD-10-CM

## 2023-08-23 DIAGNOSIS — F60.3 BORDERLINE PERSONALITY DISORDER (H): ICD-10-CM

## 2023-08-23 DIAGNOSIS — F29 PSYCHOSIS, UNSPECIFIED PSYCHOSIS TYPE (H): ICD-10-CM

## 2023-08-23 DIAGNOSIS — Z13.6 ENCOUNTER FOR LIPID SCREENING FOR CARDIOVASCULAR DISEASE: ICD-10-CM

## 2023-08-23 DIAGNOSIS — F32.A DEPRESSION WITH SUICIDAL IDEATION: ICD-10-CM

## 2023-08-23 DIAGNOSIS — Z11.59 NEED FOR HEPATITIS C SCREENING TEST: ICD-10-CM

## 2023-08-23 DIAGNOSIS — F32.3 MAJOR DEPRESSIVE DISORDER, SINGLE EPISODE, SEVERE WITH PSYCHOTIC FEATURES (H): ICD-10-CM

## 2023-08-23 DIAGNOSIS — Z12.31 ENCOUNTER FOR SCREENING MAMMOGRAM FOR BREAST CANCER: ICD-10-CM

## 2023-08-23 DIAGNOSIS — E66.01 MORBID OBESITY (H): ICD-10-CM

## 2023-08-23 DIAGNOSIS — R45.89 SUICIDAL BEHAVIOR WITHOUT ATTEMPTED SELF-INJURY: ICD-10-CM

## 2023-08-23 LAB
ALBUMIN SERPL BCG-MCNC: 4.3 G/DL (ref 3.5–5.2)
ALP SERPL-CCNC: 94 U/L (ref 35–104)
ALT SERPL W P-5'-P-CCNC: 42 U/L (ref 0–50)
ANION GAP SERPL CALCULATED.3IONS-SCNC: 10 MMOL/L (ref 7–15)
AST SERPL W P-5'-P-CCNC: 42 U/L (ref 0–45)
BILIRUB SERPL-MCNC: 0.3 MG/DL
BUN SERPL-MCNC: 11.1 MG/DL (ref 6–20)
CALCIUM SERPL-MCNC: 9.9 MG/DL (ref 8.6–10)
CHLORIDE SERPL-SCNC: 103 MMOL/L (ref 98–107)
CHOLEST SERPL-MCNC: 150 MG/DL
CREAT SERPL-MCNC: 0.99 MG/DL (ref 0.51–0.95)
DEPRECATED HCO3 PLAS-SCNC: 28 MMOL/L (ref 22–29)
ERYTHROCYTE [DISTWIDTH] IN BLOOD BY AUTOMATED COUNT: 14.1 % (ref 10–15)
GFR SERPL CREATININE-BSD FRML MDRD: 74 ML/MIN/1.73M2
GLUCOSE SERPL-MCNC: 108 MG/DL (ref 70–99)
HCT VFR BLD AUTO: 47.5 % (ref 35–47)
HDLC SERPL-MCNC: 33 MG/DL
HGB BLD-MCNC: 14.8 G/DL (ref 11.7–15.7)
LDLC SERPL CALC-MCNC: 89 MG/DL
MCH RBC QN AUTO: 28.1 PG (ref 26.5–33)
MCHC RBC AUTO-ENTMCNC: 31.2 G/DL (ref 31.5–36.5)
MCV RBC AUTO: 90 FL (ref 78–100)
NONHDLC SERPL-MCNC: 117 MG/DL
PLATELET # BLD AUTO: 307 10E3/UL (ref 150–450)
POTASSIUM SERPL-SCNC: 5 MMOL/L (ref 3.4–5.3)
PROT SERPL-MCNC: 7.7 G/DL (ref 6.4–8.3)
RBC # BLD AUTO: 5.26 10E6/UL (ref 3.8–5.2)
SODIUM SERPL-SCNC: 141 MMOL/L (ref 136–145)
TRIGL SERPL-MCNC: 142 MG/DL
TSH SERPL DL<=0.005 MIU/L-ACNC: 1.9 UIU/ML (ref 0.3–4.2)
VIT B12 SERPL-MCNC: 726 PG/ML (ref 232–1245)
WBC # BLD AUTO: 6.7 10E3/UL (ref 4–11)

## 2023-08-23 PROCEDURE — 86803 HEPATITIS C AB TEST: CPT | Performed by: INTERNAL MEDICINE

## 2023-08-23 PROCEDURE — 36415 COLL VENOUS BLD VENIPUNCTURE: CPT | Performed by: INTERNAL MEDICINE

## 2023-08-23 PROCEDURE — 99215 OFFICE O/P EST HI 40 MIN: CPT | Mod: 25 | Performed by: INTERNAL MEDICINE

## 2023-08-23 PROCEDURE — 80053 COMPREHEN METABOLIC PANEL: CPT | Performed by: INTERNAL MEDICINE

## 2023-08-23 PROCEDURE — 85027 COMPLETE CBC AUTOMATED: CPT | Performed by: INTERNAL MEDICINE

## 2023-08-23 PROCEDURE — 87389 HIV-1 AG W/HIV-1&-2 AB AG IA: CPT | Performed by: INTERNAL MEDICINE

## 2023-08-23 PROCEDURE — 99386 PREV VISIT NEW AGE 40-64: CPT | Performed by: INTERNAL MEDICINE

## 2023-08-23 PROCEDURE — 82607 VITAMIN B-12: CPT | Performed by: INTERNAL MEDICINE

## 2023-08-23 PROCEDURE — 84443 ASSAY THYROID STIM HORMONE: CPT | Performed by: INTERNAL MEDICINE

## 2023-08-23 PROCEDURE — 80061 LIPID PANEL: CPT | Performed by: INTERNAL MEDICINE

## 2023-08-23 PROCEDURE — 96127 BRIEF EMOTIONAL/BEHAV ASSMT: CPT | Performed by: INTERNAL MEDICINE

## 2023-08-23 PROCEDURE — 82306 VITAMIN D 25 HYDROXY: CPT | Mod: GZ | Performed by: INTERNAL MEDICINE

## 2023-08-23 RX ORDER — ZOLPIDEM TARTRATE 12.5 MG/1
TABLET, FILM COATED, EXTENDED RELEASE ORAL
COMMUNITY
Start: 2023-08-17 | End: 2024-02-02

## 2023-08-23 RX ORDER — CLONAZEPAM 1 MG/1
1 TABLET ORAL
COMMUNITY
End: 2024-02-02

## 2023-08-23 RX ORDER — TOPIRAMATE 100 MG/1
TABLET, FILM COATED ORAL
Status: ON HOLD | COMMUNITY
Start: 2023-07-24 | End: 2024-02-16

## 2023-08-23 RX ORDER — ALPRAZOLAM 1 MG
1 TABLET ORAL 3 TIMES DAILY
Status: ON HOLD | COMMUNITY
Start: 2023-08-03 | End: 2024-02-28

## 2023-08-23 RX ORDER — ARIPIPRAZOLE 10 MG/1
TABLET ORAL
COMMUNITY
Start: 2023-05-22 | End: 2024-02-02

## 2023-08-23 RX ORDER — VENLAFAXINE HYDROCHLORIDE 150 MG/1
150 CAPSULE, EXTENDED RELEASE ORAL 2 TIMES DAILY
Status: ON HOLD | COMMUNITY
Start: 2023-07-30 | End: 2024-02-28

## 2023-08-23 ASSESSMENT — PATIENT HEALTH QUESTIONNAIRE - PHQ9
SUM OF ALL RESPONSES TO PHQ QUESTIONS 1-9: 12
10. IF YOU CHECKED OFF ANY PROBLEMS, HOW DIFFICULT HAVE THESE PROBLEMS MADE IT FOR YOU TO DO YOUR WORK, TAKE CARE OF THINGS AT HOME, OR GET ALONG WITH OTHER PEOPLE: SOMEWHAT DIFFICULT

## 2023-08-23 ASSESSMENT — ENCOUNTER SYMPTOMS
CHILLS: 1
HEARTBURN: 0
DIZZINESS: 0
COUGH: 0
SHORTNESS OF BREATH: 1
HEMATOCHEZIA: 0
HEADACHES: 0
DIARRHEA: 1
HEMATURIA: 0
CONSTIPATION: 0
PALPITATIONS: 1
FREQUENCY: 1
PARESTHESIAS: 0
NERVOUS/ANXIOUS: 1
DYSURIA: 0
WEAKNESS: 1
BREAST MASS: 0
ARTHRALGIAS: 0
MYALGIAS: 0
SORE THROAT: 0
FEVER: 0
EYE PAIN: 0
NAUSEA: 0
JOINT SWELLING: 0
ABDOMINAL PAIN: 0

## 2023-08-23 ASSESSMENT — PAIN SCALES - GENERAL: PAINLEVEL: NO PAIN (0)

## 2023-08-23 NOTE — PATIENT INSTRUCTIONS
As discussed, please do fasting labs placed.     Will get the records from psychiatry before updating your chart here.     Placed referral sleep studies.     =======================================      Preventive Health Recommendations  Female Ages 40 to 49    Yearly exam:   See your health care provider every year in order to  Review health changes.   Discuss preventive care.    Review your medicines if your doctor prescribed any.    Get a Pap test every three years (unless you have an abnormal result and your provider advises testing more often).    If you get Pap tests with HPV test, you only need to test every 5 years, unless you have an abnormal result. You do not need a Pap test if your uterus was removed (hysterectomy) and you have not had cancer.    You should be tested each year for STDs (sexually transmitted diseases), if you're at risk.   Ask your doctor if you should have a mammogram.    Have a colonoscopy (test for colon cancer) if someone in your family has had colon cancer or polyps before age 50.     Have a cholesterol test every 5 years.     Have a diabetes test (fasting glucose) after age 45. If you are at risk for diabetes, you should have this test every 3 years.    Shots: Get a flu shot each year. Get a tetanus shot every 10 years.     Nutrition:   Eat at least 5 servings of fruits and vegetables each day.  Eat whole-grain bread, whole-wheat pasta and brown rice instead of white grains and rice.  Get adequate Calcium and Vitamin D.      Lifestyle  Exercise at least 150 minutes a week (an average of 30 minutes a day, 5 days a week). This will help you control your weight and prevent disease.  Limit alcohol to one drink per day.  No smoking.   Wear sunscreen to prevent skin cancer.  See your dentist every six months for an exam and cleaning.

## 2023-08-23 NOTE — PROGRESS NOTES
SUBJECTIVE:   CC: Misty is an 38 year old who presents for preventive health visit.       8/23/2023    10:43 AM   Additional Questions   Roomed by radha   Accompanied by mother Gina Dunn       Healthy Habits:     Getting at least 3 servings of Calcium per day:  NO    Bi-annual eye exam:  NO    Dental care twice a year:  Yes    Sleep apnea or symptoms of sleep apnea:  None    Diet:  Regular (no restrictions)    Frequency of exercise:  None    Taking medications regularly:  Yes    Medication side effects:  None    Additional concerns today:  No      Today's PHQ-9 Score:       8/23/2023    10:32 AM   PHQ-9 SCORE   PHQ-9 Total Score MyChart 12 (Moderate depression)   PHQ-9 Total Score 4       Social History     Tobacco Use    Smoking status: Never    Smokeless tobacco: Never   Substance Use Topics    Alcohol use: Not Currently             8/23/2023    10:37 AM   Alcohol Use   Prescreen: >3 drinks/day or >7 drinks/week? No          No data to display              Reviewed orders with patient.  Reviewed health maintenance and updated orders accordingly - Yes  Lab work is in process  Labs reviewed in EPIC    Breast Cancer Screening:    FHS-7:       8/23/2023    10:38 AM   Breast CA Risk Assessment (FHS-7)   Did any of your first-degree relatives have breast or ovarian cancer? No   Did any of your relatives have bilateral breast cancer? No   Did any man in your family have breast cancer? Yes   Did any woman in your family have breast and ovarian cancer? No   Did any woman in your family have breast cancer before age 50 y? No   Do you have 2 or more relatives with breast and/or ovarian cancer? No   Do you have 2 or more relatives with breast and/or bowel cancer? No     click delete button to remove this line now  Mammogram Screening - Offered annual screening and updated Health Maintenance for mutual plan based on risk factor consideration  Pertinent mammograms are reviewed under the imaging tab.    History of abnormal Pap  smear: NO - age 30-65 PAP every 5 years with negative HPV co-testing recommended      Latest Ref Rng & Units 9/1/2021     3:54 PM   PAP / HPV   PAP  Negative for Intraepithelial Lesion or Malignancy (NILM)    HPV 16 DNA Negative Negative    HPV 18 DNA Negative Negative    Other HR HPV Negative Negative      Reviewed and updated as needed this visit by clinical staff   Tobacco  Allergies  Meds  Problems  Med Hx  Surg Hx  Fam Hx          Reviewed and updated as needed this visit by Provider   Tobacco  Allergies  Meds  Problems  Med Hx  Surg Hx  Fam Hx         Past Medical History:   Diagnosis Date    Depressive disorder     Generalized anxiety disorder 5/15/2020      Past Surgical History:   Procedure Laterality Date    CHOLECYSTECTOMY       OB History   No obstetric history on file.       Review of Systems   Constitutional:  Positive for chills. Negative for fever.   HENT:  Negative for congestion, ear pain, hearing loss and sore throat.    Eyes:  Positive for visual disturbance. Negative for pain.   Respiratory:  Positive for shortness of breath. Negative for cough.    Cardiovascular:  Positive for palpitations. Negative for chest pain and peripheral edema.   Gastrointestinal:  Positive for diarrhea. Negative for abdominal pain, constipation, heartburn, hematochezia and nausea.   Breasts:  Positive for tenderness. Negative for breast mass and discharge.   Genitourinary:  Positive for frequency. Negative for dysuria, genital sores, hematuria, pelvic pain, urgency, vaginal bleeding and vaginal discharge.   Musculoskeletal:  Negative for arthralgias, joint swelling and myalgias.   Skin:  Negative for rash.   Neurological:  Positive for weakness. Negative for dizziness, headaches and paresthesias.   Psychiatric/Behavioral:  Positive for mood changes. The patient is nervous/anxious.      CONSTITUTIONAL: NEGATIVE for fever, chills, change in weight  INTEGUMENTARU/SKIN: NEGATIVE for worrisome rashes, moles or  "lesions  EYES: NEGATIVE for vision changes or irritation  ENT: NEGATIVE for ear, mouth and throat problems  RESP: NEGATIVE for significant cough or SOB  BREAST: NEGATIVE for masses, tenderness or discharge  CV: NEGATIVE for chest pain, palpitations or peripheral edema  GI: NEGATIVE for nausea, abdominal pain, heartburn, or change in bowel habits  : NEGATIVE for unusual urinary or vaginal symptoms. Periods are regular.  MUSCULOSKELETAL: NEGATIVE for significant arthralgias or myalgia  NEURO: NEGATIVE for weakness, dizziness or paresthesias  PSYCHIATRIC: NEGATIVE for changes in mood or affect     OBJECTIVE:   /82 (BP Location: Left arm, Patient Position: Sitting, Cuff Size: Adult Regular)   Pulse 89   Temp 98  F (36.7  C) (Temporal)   Resp 17   Ht 1.702 m (5' 7\")   Wt 142 kg (313 lb)   SpO2 96%   BMI 49.02 kg/m    Physical Exam  GENERAL: healthy, alert and no distress  EYES: Eyes grossly normal to inspection, PERRL and conjunctivae and sclerae normal  HENT: ear canals and TM's normal, nose and mouth without ulcers or lesions  NECK: no adenopathy, no asymmetry, masses, or scars and thyroid normal to palpation  RESP: lungs clear to auscultation - no rales, rhonchi or wheezes  BREAST: Deferred, Mammogram  CV: regular rate and rhythm, normal S1 S2, no S3 or S4, no murmur, click or rub, no peripheral edema and peripheral pulses strong  ABDOMEN: soft, nontender, no hepatosplenomegaly, no masses and bowel sounds normal  MS: no gross musculoskeletal defects noted, no edema  SKIN: no suspicious lesions or rashes  NEURO: Normal strength and tone, mentation intact and speech normal  PSYCH: mentation appears normal, affect normal/bright    Diagnostic Test Results:  Labs reviewed in Epic    ASSESSMENT/PLAN:       Assessment and Plan  1. Routine general medical examination at a health care facility  Patient is new to me, [facility] in August 2022 for annual physical at that time.    - Last lab " work at that time showing borderline dyslipidemia, normal CMP, A1c.  She does have borderline personality sorter, depression, psychosis, SI for which following psychiatry with current medications of Abilify, olanzapine, Effexor, trazodone.    - Also on metformin with last A1c normal at 5.2 in August was using it for weight loss but not using anymore due to GI side effects.  - REVIEW OF HEALTH MAINTENANCE PROTOCOL ORDERS  - HIV Antigen Antibody Combo; Future  - Hepatitis C Screen Reflex to HCV RNA Quant and Genotype; Future  - *MA Screening Digital Bilateral; Future  - Lipid panel reflex to direct LDL Fasting; Future  - Comprehensive metabolic panel (BMP + Alb, Alk Phos, ALT, AST, Total. Bili, TP); Future  - CBC with platelets; Future  - TSH with free T4 reflex; Future  - Vitamin B12; Future  - Vitamin D deficiency screening; Future    2. Major depressive disorder, single episode, severe with psychotic features (H)  3. Suicidal behavior without attempted self-injury  4. Depression with suicidal ideation  5. Borderline personality disorder (H)  6. Psychosis, unspecified psychosis type (H)  7. Severe episode of recurrent major depressive disorder, without psychotic features (H)  8. Generalized anxiety disorder  9. Chronic insomnia  Chronic medical conditions , improving but does have significant history of agitation , SI in the past for which she was treated as inpatient as mentioned by mother sylwia given post ECT memory issues on and off.   - Started with Memory issues intermittently since [facility] during 1/2021 - 2/2023 for psychiatric issues where she received ECT therapies >> memory issues. Which is mildly improving and patient is able to answer the questions asked to her by me today including giving the contact addresses of the psychiatrists etc.    - Will await on psychiatry records before considering need for neurology referral.   -Continue current medications of Abilify, olanzapine, Effexor, trazodone.     - Pt is also on Xanax 90 tabs/month as managed by psychiatry, Will get the records.   check done  - Comprehensive metabolic panel (BMP + Alb, Alk Phos, ALT, AST, Total. Bili, TP); Future  - CBC with platelets; Future  - TSH with free T4 reflex; Future      10. Fatigue, unspecified type  Ongoing problem, uncontrolled most likely underlying metabolic causes versus sleep apnea as mentioned below.  - Vitamin B12; Future  - Vitamin D deficiency screening; Future    11. Snoring  New problem added to patient problem list, will check for sleep studies before considering further recommendations of positive sleep apnea if any.  Patient understood and agreed with the plan.  - Adult Sleep Eval & Management  Referral; Future    12. Morbid obesity (H)  Ongoing problem, uncontrolled.  Discussed on need for weight management program including medical weight management versus surgical if she is deciding on it but patient declined all the services offered.  BMI of 49 which is leading to comorbidities, understands all the risks and complications.  - TSH with free T4 reflex; Future  - Adult Sleep Eval & Management  Referral; Future    13. Encounter for screening mammogram for breast cancer  - *MA Screening Digital Bilateral; Future    14. Need for hepatitis C screening test  - Hepatitis C Screen Reflex to HCV RNA Quant and Genotype; Future    15. Encounter for lipid screening for cardiovascular disease  - Lipid panel reflex to direct LDL Fasting; Future    16. Screening for HIV (human immunodeficiency virus)  - HIV Antigen Antibody Combo; Future         Patient mother geno was available in the room today who helped in conversation, answered all the questions. Mother Mona who is the caregiver once patient was released from her [place] residency.      Over 40 minutes spent outside the preventive visit ( 20 minutes ) on reviewing patient chart,  face to face encounter, greater than 50% time spent with  plan/cordination of care and documentation as above in my A/P.          Please note that this note consists of symbols derived from keyboarding, dictation and/or voice recognition software. As a result, there may be errors in the script that have gone undetected. Please consider this when interpreting information found in this chart.    Patient Instructions   As discussed, please do fasting labs placed.     Will get the records from psychiatry before updating your chart here.     Placed referral sleep studies.     =======================================      Preventive Health Recommendations  Female Ages 40 to 49    Yearly exam:   See your health care provider every year in order to  Review health changes.   Discuss preventive care.    Review your medicines if your doctor prescribed any.    Get a Pap test every three years (unless you have an abnormal result and your provider advises testing more often).    If you get Pap tests with HPV test, you only need to test every 5 years, unless you have an abnormal result. You do not need a Pap test if your uterus was removed (hysterectomy) and you have not had cancer.    You should be tested each year for STDs (sexually transmitted diseases), if you're at risk.   Ask your doctor if you should have a mammogram.    Have a colonoscopy (test for colon cancer) if someone in your family has had colon cancer or polyps before age 50.     Have a cholesterol test every 5 years.     Have a diabetes test (fasting glucose) after age 45. If you are at risk for diabetes, you should have this test every 3 years.    Shots: Get a flu shot each year. Get a tetanus shot every 10 years.     Nutrition:   Eat at least 5 servings of fruits and vegetables each day.  Eat whole-grain bread, whole-wheat pasta and brown rice instead of white grains and rice.  Get adequate Calcium and Vitamin D.      Lifestyle  Exercise at least 150 minutes a week (an average of 30 minutes a day, 5 days a week). This  will help you control your weight and prevent disease.  Limit alcohol to one drink per day.  No smoking.   Wear sunscreen to prevent skin cancer.  See your dentist every six months for an exam and cleaning.Return in about 4 months (around 12/23/2023), or if symptoms worsen or fail to improve, for video visit, If symptoms persist, Follow up of last visit.    Jelena Mackey MD  [facility]      Patient has been advised of split billing requirements and indicates understanding: Yes    Depression Screening Follow Up        8/23/2023    10:32 AM   PHQ   PHQ-9 Total Score 4   Q9: Thoughts of better off dead/self-harm past 2 weeks Not at all   F/U: Thoughts of suicide or self-harm No   F/U: Safety concerns No         8/23/2023    10:32 AM   Last PHQ-9   1.  Little interest or pleasure in doing things 1   2.  Feeling down, depressed, or hopeless 1   3.  Trouble falling or staying asleep, or sleeping too much 1   4.  Feeling tired or having little energy 1   5.  Poor appetite or overeating 0   6.  Feeling bad about yourself 0   7.  Trouble concentrating 0   8.  Moving slowly or restless 0   Q9: Thoughts of better off dead/self-harm past 2 weeks 0   PHQ-9 Total Score 4   In the past two weeks have you had thoughts of suicide or self harm? No   Do you have concerns about your personal safety or the safety of others? No       Follow Up      Follow Up Actions Taken  Crisis resource information provided in the After Visit Summary  Referred patient back to mental health provider    Discussed the following ways the patient can remain in a safe environment:  remove alcohol, remove drugs, secure medications: Mentioned to the mother, dispose of old medications , remove access to firearms: Mentioned to the mother, and remove things I could use to hurt myself: Mentioned to the mother    COUNSELING:  Reviewed preventive health counseling, as reflected in patient instructions  Special attention given to:        " Regular exercise       Healthy diet/nutrition       Vision screening       Hearing screening       Osteoporosis prevention/bone health       (Poppy)menopause management      BMI:   Estimated body mass index is 49.02 kg/m  as calculated from the following:    Height as of this encounter: 1.702 m (5' 7\").    Weight as of this encounter: 142 kg (313 lb).   Weight management plan: Discussed healthy diet and exercise guidelines offered her weight management program which she declined.      She reports that she has never smoked. She has never used smokeless tobacco.          Jelena Mackey MD  [facility]  "

## 2023-08-24 ENCOUNTER — TELEPHONE (OUTPATIENT)
Dept: FAMILY MEDICINE | Facility: CLINIC | Age: 38
End: 2023-08-24
Payer: MEDICARE

## 2023-08-24 LAB
DEPRECATED CALCIDIOL+CALCIFEROL SERPL-MC: 39 UG/L (ref 20–75)
HCV AB SERPL QL IA: NONREACTIVE
HIV 1+2 AB+HIV1 P24 AG SERPL QL IA: NONREACTIVE

## 2023-08-24 NOTE — TELEPHONE ENCOUNTER
----- Message from Jelena Mackey MD sent at 8/24/2023 10:07 AM CDT -----  Your one of the kidney numbers is showing mild dehydration but no acute concerns.  Please hydrate well for normalization.    Your good cholesterol HDL level is mildly low, please follow the diet below for improvement.    All your OTHER labs are normal, there might be some highlighted which doesn't have any clinical significance.    an HDL-boosting, antioxidant-rich breakfast, try making a smoothie containing berries, kale or spinach, avocado, and non-dairy milk such as almond milk. ( of which Avocado has most HDL boosting quality )      Jelena Mackey MD  Internal medicine physician  [facility]

## 2023-08-28 NOTE — TELEPHONE ENCOUNTER
Patient Contact    Attempt # 2    Was Call answered? No    Non-detailed voicemail left to call clinic at: [phone].     On Call Back:    Please relay provider's message to patient if she has not viewed it on [email or web address].    Maddi HAUSER RN  [facility] Triage Team

## 2023-08-29 NOTE — TELEPHONE ENCOUNTER
Relayed to pt and helped reset mc.  Patricia Farias, RN  [facility] RN Triage Team

## 2023-09-13 NOTE — PLAN OF CARE
Problem: Depressive Symptoms  Goal: Depressive Symptoms  Description  Signs and symptoms of listed problems will be absent or manageable.  9/12/2019 2204 by Alicia Fu RN  Flowsheets (Taken 9/12/2019 2204)  Depressive Symptoms Assessed: all  Depressive Symptoms Present: affect; mood; anxiety; thought process  Note:   Patient isolative to room, appears depressed. Did not attend groups. During staff check in patient reflected on being  from her  for the past 2 years. Patient works and claims her  drives [place] the [place]. Despite the dual income patient claims  still spends most of her money. Patient does not know what he spends it on. Patient claims she does not want to tolerate arguing with her  so she continues to let him drain their accounts. Patient may, however, consider opening a new account and writing the household checks to get a better idea of how the household money is spent. Patient endorses fleeting thoughts of suicide via overdose, but does contract for safety.      
  Problem: Depressive Symptoms  Goal: Depressive Symptoms  Description  Signs and symptoms of listed problems will be absent or manageable.  9/13/2019 2158 by Alicia Fu RN  Outcome: No Change  Flowsheets (Taken 9/13/2019 2158)  Depressive Symptoms Assessed: all  Depressive Symptoms Present: mood; affect  Note:   Patient isolative & withdrawn this shift but did come out to the lounge to eat dinner. Mood & affect appeared slightly better today. Patient did not have much to say during attempted staff check in. Patient endorses fleeting thoughts of suicide, but contracts for safety.     
  Problem: Depressive Symptoms  Goal: Depressive Symptoms  Description  Signs and symptoms of listed problems will be absent or manageable.  9/18/2019 2329 by Quan Hester  Flowsheets (Taken 9/18/2019 2329)  Depressive Symptoms Assessed: all  Depressive Symptoms Present: anxiety; affect; sleep; suicidality  Note:   Patient spent the majority of the shift in her room but did spend sometime watching TV but minimally social with peers. Patient endorses suicidal ideation but contracts for safety. Patient stated that she does not follow through with her attempts to overdose because she is scared about the potential pain of overdosing.      
  Problem: Depressive Symptoms  Goal: Depressive Symptoms  Description  Signs and symptoms of listed problems will be absent or manageable.  Flowsheets (Taken 9/14/2019 1431)  Depressive Symptoms Assessed: all  Depressive Symptoms Present: affect; mood  Note:   Patient was isolative and withdrawn for most of the shift however she became visible on the unit but still withdrawn. Patient stated that her mood is poor but denies suicidal ideation. Patient felt lethargic and states that she has trouble getting out of bed. Patient reports that she feels anxious about major life decisions but did not elaborate when asked. Patient ate meals.       
  Problem: Depressive Symptoms  Goal: Depressive Symptoms  Description  Signs and symptoms of listed problems will be absent or manageable.  Outcome: No Change  Flowsheets  Taken 9/15/2019 1328 by Domenica Grimes  Depressive Symptoms Assessed: all  Taken 9/17/2019 1402 by Robert Shaver  Depressive Symptoms Present: affect;mood;anxiety;thought process;other (see comment);suicidality  Note:   Pt presents with blunt affect and depressed mood. Pt spent the majority of the shift in her room reading or journaling. Pt is withdrawn, but pleasant and cooperative with staff. Pt attended all unit programming besides spirituality, but was minimally social or participant. Pt ate meals in the lounge but by herself. Pt stated that she didn't feel better or worse from yesterday. Pt does agree with another course of ECT because she does believe it helps. Pt admitted to passive thoughts of Si. Pt was med compliant..     
  Problem: Depressive Symptoms  Goal: Depressive Symptoms  Description  Signs and symptoms of listed problems will be absent or manageable.  Outcome: No Change  Flowsheets  Taken 9/18/2019 2329 by Quan Hester  Depressive Symptoms Assessed: all  Taken 9/19/2019 1337 by Robert Shaver  Depressive Symptoms Present: affect;mood;insight;thought process;psychomotor activity  Note:   Pt presents with a blunt affect and calm, but depressed mood. Pt spent the majority of the shift isolating in her room while reading or coloring. Pt is mainly withdrawn and still flat upon approach. Pt responds in few word answers and is very broad. Pt stated that she feels worse today than yesterday. Pt knows ECT will help her but is just scared of the process and doesn't currently feel better.  Pt ate all of her food and was med compliant.Pt endorses passive Si but contracts for safety.     
"  Problem: Depressive Symptoms  Goal: Depressive Symptoms  Description  Signs and symptoms of listed problems will be absent or manageable.  9/15/2019 1328 by Domenica Grimes  Outcome: No Change  Flowsheets (Taken 9/15/2019 1328)  Depressive Symptoms Assessed: all  Depressive Symptoms Present: affect; mood  Note:   Pt presents with blunted, sad affect. Pt reports a slight improvement in mood, but continues to feel depressed. She is isolative and withdrawn and spends most of the day resting in bed. Pt comes to the lounge for meals, but prefers to sit alone rather than socialize with others. Pt's speech and cognitions are clear, but judgement and insight are impaired. Pt is currently unsure of the steps she can take and goals she can set to promote her wellbeing, but hopes that these will become apparent after more self-reflection and communication with her doctor. Pt continues to endorse Si with a plan, but has no means to follow through. She states that for her this is \"just how it is.\" Pt is med compliant.      "
"  Problem: Depressive Symptoms  Goal: Depressive Symptoms  Description  Signs and symptoms of listed problems will be absent or manageable.  Outcome: No Change  Flowsheets  Taken 9/15/2019 1328 by Domenica Grimes  Depressive Symptoms Assessed: all  Taken 9/18/2019 1347 by Ivana Serrano  Depressive Symptoms Present: suicidality;affect;mood;thought process  Note:   Pt presents with flat affect and depressed mood. Pt thought process and insight poor. Pt came back from ECT this morning and spent most of the shift laying in bed/napping. Pt states she feels tired and is \"okay\", although does endorse passing suicidal thoughts. Pt contracts for safety.      "
"Patient presents with a flat affect and depressed mood. Isolative and withdrawn. She spent most of the evening bed resting. Pt stated that she wasn't tired, \"just  bored.\" Pt came out to the lounge to eat supper after encouragement. She preferred to sit alone.  "
"Pt presents with flat blunt affect and depressed/hopeless mood. Pt denies SI but states she just doesn't wish to be alive anymore. States she is \"doing alright\" but \"feels off\" today due to new medication adjustments. Pt needs prompts for groups, hygiene, and meal tray retrieval. Med compliant, denies SI.  "
"Pt. Remains depressed in appearance. Reports that she has made little to no progress since admission. Remains hopeless with suicidal thoughts. No energy. No positive thoughts about current state or future. \" All I want to do is sleep\" Isolative. Attends group but little participation and very minimal interaction with peers. Starting to be willing to re-consider ECT as this did help in the past.     "
34 year old female received from ER after being sent in for admission by OP provider this AM. Depressed with suicidal ideation. Thoughts of overdosing on prescribed medication. Reports struggling with severe depressive symptoms for the past month. Very depressed,withdrawn and soft spoken in presentation. Having difficulty functioning at home and work.Contracts for safety in the hospital.    Nursing assessment complete including patient and medication profiles. Risk assessments completed addressing suicide,fall,skin,nutrition and safety issues. Care plan initiated. Assessments reviewed with physician and admit orders received. Video monitoring in progress, Patient Informed. Welcome packet reviewed with patient. Information reviewed includes getting emergency help, preventing infections, understanding your care, using medication safely, reducing falls, preventing pressure ulcers, smoking cessation, powerful choices and Patients Bill of Rights. Pt. given tour of the unit and instruction on use of facility including emergency call light. Program schedule reviewed with patient. Questions regarding the unit addressed. Pt. Search completed and belongings inventoried.       
BEHAVIORAL TEAM DISCUSSION    Participants: MD, CM, RN, OT, PA  Progress: isolative, anxious, depressed, passive suicidal ideation  Anticipated length of stay: unknown  Continued Stay Criteria/Rationale: medication adjustment  Medical/Physical: NA  Precautions:   Behavioral Orders   Procedures    Code 1 - Restrict to Unit    Routine Programming     As clinically indicated    Status 15     Every 15 minutes.    Status 1:1     Constant visual observations     Plan: medication adjustment, consider ECT if patient does not respond to medication changes, encourage groups  Rationale for change in precautions or plan: NA        
BEHAVIORAL TEAM DISCUSSION    Participants: MD, RN, CM, PA, OT   Progress: Improving   Anticipated length of stay: Probable discharge on Friday 09/20  Continued Stay Criteria/Rationale:  Improved and ready for discharge.   Medical/Physical: Per hospitalist consult  Precautions: None  Behavioral Orders   Procedures    Code 1 - Restrict to Unit    Electroconvulsive therapy     Series of up to 12 treatments. Begin Date: 9/16/2019     Treating Psychiatrist providing ECT:  Dr. Kwon and Dr Madrigal     Notified on:  9/13/19    Routine Programming     As clinically indicated    Status 15     Every 15 minutes.    Status 1:1     Constant visual observations     Plan: Patient to have maintenance ECT as an outpatient on 10/18/19. Patient to do the [facility] at discharge.   Rationale for change in precautions or plan: Continued support following discharge.         
Patient has chronic thoughts of suicide by overdosing. Contracts for safety. Affect is flat and mood is depressed. Poor insight and  judgement.Thought process is intact. Attending groups but also spending time in her bed in her room as often as she is able to do so.  
Patient presents with a flat affect and depressed mood. Pt was more visible in the lounge, but withdrawn and reserved. Endorses passive SI. No groups.   
Pt presents with a blunt affect and a depressed mood. Pt was isolative and withdrawn, but stated that she has been trying to go to groups and come out of her room more. Pt stated that she is still having chronic suicidal thoughts with a plan to Overdose. Pt contracts for safety, and hopes that ECT is helpful with SI thoughts as it has been in the past. Pt stated that she does not have a good support system and doesn't have anyone to talk to much of the time, as her  and one of her sons do not have a good understanding of Mental Illness. Pt's insight is poor and  her judgement is impaired.   
Pt presents with clam mood but blunt affect Pt insight is improving but thought process remains impaired. Pt spent some time in the lounge socializing with peers and came up for her meds herself. Pt denies SI and feels that she is improving.      
Reviewed AVS/discharge medications with patient. Answered her questions. She verbalized understanding of discharge plan. She denies SI or self harm intent. Pt discharged to home  
fever

## 2023-12-13 ENCOUNTER — NURSE TRIAGE (OUTPATIENT)
Dept: SCHEDULING | Facility: CLINIC | Age: 38
End: 2023-12-13

## 2023-12-13 ENCOUNTER — OFFICE VISIT (OUTPATIENT)
Dept: URGENT CARE | Facility: URGENT CARE | Age: 38
End: 2023-12-13
Payer: MEDICARE

## 2023-12-13 VITALS
OXYGEN SATURATION: 95 % | DIASTOLIC BLOOD PRESSURE: 72 MMHG | SYSTOLIC BLOOD PRESSURE: 119 MMHG | HEART RATE: 104 BPM | TEMPERATURE: 97.8 F

## 2023-12-13 DIAGNOSIS — R11.2 NAUSEA AND VOMITING, UNSPECIFIED VOMITING TYPE: ICD-10-CM

## 2023-12-13 DIAGNOSIS — R10.84 ABDOMINAL PAIN, GENERALIZED: Primary | ICD-10-CM

## 2023-12-13 DIAGNOSIS — E86.0 DEHYDRATION: ICD-10-CM

## 2023-12-13 PROCEDURE — 99214 OFFICE O/P EST MOD 30 MIN: CPT | Performed by: NURSE PRACTITIONER

## 2023-12-13 NOTE — TELEPHONE ENCOUNTER
"S-(situation): Patient is calling and stated she has not been able to eat food in the past 2 weeks due to getting nauseated and having stomach pain rating 7/10, every time she eats.  She stated she is able to keep liquids down, but not food.  She stated no other symptoms at this time.    Advised patient to seek urgent care leaving now per protocol, at the [facility], she stated this was the closest clinic to her home.  Patient stated understanding and stated she has someone to drive her to urgent care at this time.    Will forward to PCP for FYI    Reason for Disposition   Patient sounds very sick or weak to the triager    Additional Information   Negative: Passed out (i.e., fainted, collapsed and was not responding)   Negative: Shock suspected (e.g., cold/pale/clammy skin, too weak to stand, low BP, rapid pulse)   Negative: Sounds like a life-threatening emergency to the triager   Negative: Followed an abdomen (stomach) injury   Negative: Chest pain   Negative: Abdominal pain and pregnant < 20 weeks   Negative: Abdominal pain and pregnant 20 or more weeks   Negative: Pain is mainly in upper abdomen (if needed ask: 'is it mainly above the belly button?')   Negative: Abdomen bloating or swelling are main symptoms   Negative: SEVERE abdominal pain (e.g., excruciating)   Negative: Vomiting red blood or black (coffee ground) material   Negative: Blood in bowel movements  (Exception: Blood on surface of BM with constipation.)   Negative: Black or tarry bowel movements  (Exception: Chronic-unchanged black-grey BMs AND is taking iron pills or Pepto-Bismol.)   Negative: MILD TO MODERATE constant pain lasting > 2 hours   Negative: Vomiting bile (green color)    Answer Assessment - Initial Assessment Questions  1. LOCATION: \"Where does it hurt?\"       All over    2. RADIATION: \"Does the pain shoot anywhere else?\" (e.g., chest, back)      No    3. ONSET: \"When did the pain begin?\" (e.g., minutes, hours or days ago) " "      Approximately 2 weeks ago    4. SUDDEN: \"Gradual or sudden onset?\"      Every time eating would get nauseous    5. PATTERN \"Does the pain come and go, or is it constant?\"     - If it comes and goes: \"How long does it last?\" \"Do you have pain now?\"      (Note: Comes and goes means the pain is intermittent. It goes away completely between bouts.)     - If constant: \"Is it getting better, staying the same, or getting worse?\"       (Note: Constant means the pain never goes away completely; most serious pain is constant and gets worse.)       Only when eating and after eating    6. SEVERITY: \"How bad is the pain?\"  (e.g., Scale 1-10; mild, moderate, or severe)     - MILD (1-3): Doesn't interfere with normal activities, abdomen soft and not tender to touch.      - MODERATE (4-7): Interferes with normal activities or awakens from sleep, abdomen tender to touch.      - SEVERE (8-10): Excruciating pain, doubled over, unable to do any normal activities.        7/10    7. RECURRENT SYMPTOM: \"Have you ever had this type of stomach pain before?\" If Yes, ask: \"When was the last time?\" and \"What happened that time?\"       No    8. CAUSE: \"What do you think is causing the stomach pain?\"      Unknown    9. RELIEVING/AGGRAVATING FACTORS: \"What makes it better or worse?\" (e.g., antacids, bending or twisting motion, bowel movement)      Eating makes it worse    10. OTHER SYMPTOMS: \"Do you have any other symptoms?\" (e.g., back pain, diarrhea, fever, urination pain, vomiting)        No    11. PREGNANCY: \"Is there any chance you are pregnant?\" \"When was your last menstrual period?\"        No    Protocols used: Abdominal Pain - Female-A-[place]  Marianna Banks RN    "

## 2023-12-13 NOTE — TELEPHONE ENCOUNTER
"  Reason for Call:  Appointment Request    Patient requesting this type of appt:  Office visit - abdominal discomfort    Requested provider:  Jelena Mackey    Reason patient unable to be scheduled: Not within requested timeframe    When does patient want to be seen/preferred time:  asap    Comments: Pt is requesting appt with provider asap in regards to abdominal bloating/discomfort when eating. States for the last two weeks she has this and feels as though the food is \"coming up\", but is not vomiting.    Could we send this information to you in [facility] or would you prefer to receive a phone call?:   Patient would prefer a phone call   Okay to leave a detailed message?: Yes at Cell number on file:    Telephone Information:   Mobile [phone]       Call taken on 12/13/2023 at 4:10 PM by Deborah Bowden  "

## 2023-12-14 ENCOUNTER — OFFICE VISIT (OUTPATIENT)
Dept: PEDIATRICS | Facility: CLINIC | Age: 38
End: 2023-12-14
Payer: MEDICARE

## 2023-12-14 ENCOUNTER — ANCILLARY PROCEDURE (OUTPATIENT)
Dept: CT IMAGING | Facility: CLINIC | Age: 38
End: 2023-12-14
Attending: PREVENTIVE MEDICINE
Payer: MEDICARE

## 2023-12-14 VITALS
RESPIRATION RATE: 16 BRPM | HEIGHT: 67 IN | SYSTOLIC BLOOD PRESSURE: 128 MMHG | DIASTOLIC BLOOD PRESSURE: 76 MMHG | HEART RATE: 98 BPM | OXYGEN SATURATION: 95 % | BODY MASS INDEX: 45.99 KG/M2 | WEIGHT: 293 LBS | TEMPERATURE: 98.4 F

## 2023-12-14 DIAGNOSIS — K59.00 CONSTIPATION, UNSPECIFIED CONSTIPATION TYPE: Primary | ICD-10-CM

## 2023-12-14 DIAGNOSIS — R10.84 ABDOMINAL PAIN, GENERALIZED: ICD-10-CM

## 2023-12-14 DIAGNOSIS — R74.8 ELEVATED LIVER ENZYMES: ICD-10-CM

## 2023-12-14 LAB
ALBUMIN SERPL BCG-MCNC: 4.3 G/DL (ref 3.5–5.2)
ALBUMIN UR-MCNC: 30 MG/DL
ALP SERPL-CCNC: 83 U/L (ref 40–150)
ALT SERPL W P-5'-P-CCNC: 59 U/L (ref 0–50)
ANION GAP SERPL CALCULATED.3IONS-SCNC: 12 MMOL/L (ref 7–15)
APPEARANCE UR: ABNORMAL
AST SERPL W P-5'-P-CCNC: 68 U/L (ref 0–45)
BACTERIA #/AREA URNS HPF: ABNORMAL /HPF
BASOPHILS # BLD AUTO: 0 10E3/UL (ref 0–0.2)
BASOPHILS NFR BLD AUTO: 0 %
BILIRUB SERPL-MCNC: 0.4 MG/DL
BILIRUB UR QL STRIP: ABNORMAL
BUN SERPL-MCNC: 5.7 MG/DL (ref 6–20)
CALCIUM SERPL-MCNC: 9.8 MG/DL (ref 8.6–10)
CHLORIDE SERPL-SCNC: 101 MMOL/L (ref 98–107)
COLOR UR AUTO: YELLOW
CREAT SERPL-MCNC: 0.87 MG/DL (ref 0.51–0.95)
DEPRECATED HCO3 PLAS-SCNC: 26 MMOL/L (ref 22–29)
EGFRCR SERPLBLD CKD-EPI 2021: 86 ML/MIN/1.73M2
EOSINOPHIL # BLD AUTO: 0.1 10E3/UL (ref 0–0.7)
EOSINOPHIL NFR BLD AUTO: 1 %
ERYTHROCYTE [DISTWIDTH] IN BLOOD BY AUTOMATED COUNT: 14 % (ref 10–15)
GLUCOSE SERPL-MCNC: 108 MG/DL (ref 70–99)
GLUCOSE UR STRIP-MCNC: NEGATIVE MG/DL
HCG UR QL: NEGATIVE
HCT VFR BLD AUTO: 46.2 % (ref 35–47)
HGB BLD-MCNC: 14.8 G/DL (ref 11.7–15.7)
HGB UR QL STRIP: NEGATIVE
IMM GRANULOCYTES # BLD: 0 10E3/UL
IMM GRANULOCYTES NFR BLD: 0 %
KETONES UR STRIP-MCNC: 40 MG/DL
LEUKOCYTE ESTERASE UR QL STRIP: NEGATIVE
LIPASE SERPL-CCNC: 23 U/L (ref 13–60)
LYMPHOCYTES # BLD AUTO: 1.3 10E3/UL (ref 0.8–5.3)
LYMPHOCYTES NFR BLD AUTO: 19 %
MCH RBC QN AUTO: 28.7 PG (ref 26.5–33)
MCHC RBC AUTO-ENTMCNC: 32 G/DL (ref 31.5–36.5)
MCV RBC AUTO: 90 FL (ref 78–100)
MONOCYTES # BLD AUTO: 0.6 10E3/UL (ref 0–1.3)
MONOCYTES NFR BLD AUTO: 9 %
NEUTROPHILS # BLD AUTO: 4.8 10E3/UL (ref 1.6–8.3)
NEUTROPHILS NFR BLD AUTO: 71 %
NITRATE UR QL: NEGATIVE
PH UR STRIP: 5.5 [PH] (ref 5–7)
PLATELET # BLD AUTO: 276 10E3/UL (ref 150–450)
POTASSIUM SERPL-SCNC: 4.2 MMOL/L (ref 3.4–5.3)
PROT SERPL-MCNC: 7.3 G/DL (ref 6.4–8.3)
RBC # BLD AUTO: 5.16 10E6/UL (ref 3.8–5.2)
RBC #/AREA URNS AUTO: ABNORMAL /HPF
SODIUM SERPL-SCNC: 139 MMOL/L (ref 135–145)
SP GR UR STRIP: >=1.03 (ref 1–1.03)
SQUAMOUS #/AREA URNS AUTO: ABNORMAL /LPF
TSH SERPL DL<=0.005 MIU/L-ACNC: 1.54 UIU/ML (ref 0.3–4.2)
UROBILINOGEN UR STRIP-ACNC: 1 E.U./DL
WBC # BLD AUTO: 6.8 10E3/UL (ref 4–11)
WBC #/AREA URNS AUTO: ABNORMAL /HPF

## 2023-12-14 PROCEDURE — 87086 URINE CULTURE/COLONY COUNT: CPT | Performed by: PREVENTIVE MEDICINE

## 2023-12-14 PROCEDURE — 81001 URINALYSIS AUTO W/SCOPE: CPT | Performed by: PREVENTIVE MEDICINE

## 2023-12-14 PROCEDURE — 250N000009 HC RX 250: Performed by: PREVENTIVE MEDICINE

## 2023-12-14 PROCEDURE — 74177 CT ABD & PELVIS W/CONTRAST: CPT | Mod: MG

## 2023-12-14 PROCEDURE — 85025 COMPLETE CBC W/AUTO DIFF WBC: CPT | Performed by: PREVENTIVE MEDICINE

## 2023-12-14 PROCEDURE — 84443 ASSAY THYROID STIM HORMONE: CPT | Performed by: PREVENTIVE MEDICINE

## 2023-12-14 PROCEDURE — 99215 OFFICE O/P EST HI 40 MIN: CPT | Performed by: PREVENTIVE MEDICINE

## 2023-12-14 PROCEDURE — 250N000011 HC RX IP 250 OP 636: Performed by: PREVENTIVE MEDICINE

## 2023-12-14 PROCEDURE — 80053 COMPREHEN METABOLIC PANEL: CPT | Performed by: PREVENTIVE MEDICINE

## 2023-12-14 PROCEDURE — 99417 PROLNG OP E/M EACH 15 MIN: CPT | Performed by: PREVENTIVE MEDICINE

## 2023-12-14 PROCEDURE — 36415 COLL VENOUS BLD VENIPUNCTURE: CPT | Performed by: PREVENTIVE MEDICINE

## 2023-12-14 PROCEDURE — G1010 CDSM STANSON: HCPCS

## 2023-12-14 PROCEDURE — 83690 ASSAY OF LIPASE: CPT | Performed by: PREVENTIVE MEDICINE

## 2023-12-14 PROCEDURE — 81025 URINE PREGNANCY TEST: CPT | Performed by: PREVENTIVE MEDICINE

## 2023-12-14 RX ORDER — IOPAMIDOL 755 MG/ML
120 INJECTION, SOLUTION INTRAVASCULAR ONCE
Status: COMPLETED | OUTPATIENT
Start: 2023-12-14 | End: 2023-12-14

## 2023-12-14 RX ADMIN — SODIUM CHLORIDE 40 ML: 9 INJECTION, SOLUTION INTRAVENOUS at 11:02

## 2023-12-14 RX ADMIN — IOPAMIDOL 120 ML: 755 INJECTION, SOLUTION INTRAVENOUS at 11:02

## 2023-12-14 NOTE — PROGRESS NOTES
Chief Complaint   Patient presents with    Urgent Care     Pt reports being unable to eat for the last 2 weeks with nausea and bloating. Difficulty getting food down.         ICD-10-CM    1. Abdominal pain, generalized  R10.84 Referral to Acute and Diagnostic Services (Day of diagnostic / First order acute)      2. Dehydration  E86.0 Referral to Acute and Diagnostic Services (Day of diagnostic / First order acute)      3. Nausea and vomiting, unspecified vomiting type  R11.2 Referral to Acute and Diagnostic Services (Day of diagnostic / First order acute)      Patient needs IV fluids and probable workup for her abdominal pain.  She is agreeable to going to the acute and diagnostic service Center tomorrow.  She is instructed to go to the emergency room if she develops severe pain fever or uncontrolled vomiting overnight.  Acute diagnostic services to call her in the morning and set up time.    37 minutes total time spent reviewing patient's chart, completing history and physical exam, establishing plan of care, and completing documentation.    Subjective     Misty Parham is an 38 year old female who presents to clinic today for nausea and vomiting, abdominal bloating, unable to eat any significant amount of food for the last 2 weeks.  She reports she is drinking fluids but has been very difficult to keep food down.  She reports voiding a very small amount 1 time today, urine is dark in color.  She reports having regular bowel movements.    ROS: 10 point ROS neg other than the symptoms noted above in the HPI.       Objective    /72   Pulse 104   Temp 97.8  F (36.6  C) (Tympanic)   SpO2 95%   Nurses notes and VS have been reviewed.    Physical Exam       GENERAL APPEARANCE: alert and moderate distress     EYES: PERRL, EOMI, sclera non-icteric     HENT: ear canals and TM's normal and nose and mouth without ulcers or lesions, lips are dry and chapped, tongue and mucous membranes are dry     NECK: no adenopathy  or asymmetry, thyroid normal to palpation     RESP: lungs clear to auscultation - no rales, rhonchi or wheezes     CV: regular rates and rhythm, no murmurs, rubs, or gallop     ABDOMEN: Bowel sounds are distant, generalized tenderness, exam is significantly limited by body habitus     MS: extremities normal- no gross deformities noted; normal muscle tone.     SKIN: no suspicious lesions or rashes     NEURO: Normal strength and tone, mentation intact and speech normal     PSYCH: Affect is somewhat flat.      WILLIAN Alvarenga, CNP  [facility] Provider    The use of Dragon/[employer] dictation services may have been used to construct the content in this note; any grammatical or spelling errors are non-intentional. Please contact the author of this note directly if you are in need of any clarification.

## 2023-12-14 NOTE — PROGRESS NOTES
Assessment & Plan   (R74.8) Elevated liver enzymes    (K59.00) Constipation, unspecified constipation type  (primary encounter diagnosis)    Elevated liver enzymes - likely from fatty liver  Further assessment advised in primary care follow up    Suspect pain from constipation  Miralax 17 grams 1-2 times per day  Fruits and vegetables  Fluids    Follow up in 2 weeks for recheck or sooner as needed    98 minutes spent by me on the date of the encounter doing chart review, history and exam, documentation and further activities per the note       See Patient Instructions    No follow-ups on file.    Francis Zamarripa MD  [facility] FARSHAD Jay is a 38 year old, presenting for the following health issues:  Abdominal Pain      HPI     Abdominal/Flank Pain  Onset/Duration: 2 weeks  Description:   Character: Dull ache and Cramping  Location: flaquito-umbilical region  Radiation: None  Intensity: moderate  Progression of Symptoms:  Feels like maybe a little bit better but just not going away  Accompanying Signs & Symptoms:  Fever/chills: no   Gas/Bloating: YES  Nausea: YES  Vomitting: no   Diarrhea: no   Constipation:no   Dysuria: no            Hematuria: no            Frequency: no            Incontinence of urine: no   History:            Last bowel movement: yesterday  Trauma: no   Previous similar pain: no    Previous tests done: none           Previous Abdominal surgery: YES- Cholecystectomy in 2015  Precipitating factors:   Does the pain change with:     Food: YES- gets worse     Bowel Movement: no     Urination: no              Other factors: no   Therapies tried and outcome:  Milk of Magnesia, Enema, Miralax    When food last eaten: 4 pm last night        Review of Systems   Constitutional, HEENT, cardiovascular, pulmonary, GI, , musculoskeletal, neuro, skin, endocrine and psych systems are negative, except as otherwise noted.      Objective    /76 (BP  "Location: Right arm, Patient Position: Sitting, Cuff Size: Adult Large)   Pulse 98   Temp 98.4  F (36.9  C)   Resp 16   Ht 1.702 m (5' 7\")   Wt 137.2 kg (302 lb 6.4 oz)   SpO2 95%   BMI 47.36 kg/m    Body mass index is 47.36 kg/m .  Physical Exam   GENERAL: healthy, alert and no distress  EYES: Eyes grossly normal to inspection, PERRL and conjunctivae and sclerae normal  HENT: ear canals and TM's normal, nose and mouth without ulcers or lesions  NECK: no adenopathy, no asymmetry, masses, or scars and thyroid normal to palpation  RESP: lungs clear to auscultation - no rales, rhonchi or wheezes  CV: regular rate and rhythm, normal S1 S2, no S3 or S4, no murmur, click or rub, no peripheral edema and peripheral pulses strong  ABDOMEN: soft, mild ttp diffusely, no guarding, no rebound, no hepatosplenomegaly, no masses and bowel sounds normal  MS: no gross musculoskeletal defects noted, no edema  SKIN: no suspicious lesions or rashes  NEURO: Normal strength and tone, mentation intact and speech normal  PSYCH: mentation appears normal, affect normal/bright    Results for orders placed or performed in visit on 12/14/23   CT Abdomen Pelvis w Contrast     Status: None    Narrative    EXAM: CT ABDOMEN PELVIS W CONTRAST  LOCATION: [facility]  DATE: 12/14/2023    INDICATION: mid abdominal pain  COMPARISON: None.  TECHNIQUE: CT scan of the abdomen and pelvis was performed following injection of IV contrast. Multiplanar reformats were obtained. Dose reduction techniques were used.  CONTRAST: 120ml isovue 370    FINDINGS:   LOWER CHEST: Subsegmental atelectasis is seen in the left lower lobe.    HEPATOBILIARY: Diffuse hepatic steatosis is seen. Gallbladder is surgically absent.    PANCREAS: No significant mass, duct dilatation, or inflammatory change.    SPLEEN: Normal size.    ADRENAL GLANDS: No significant nodules.    KIDNEYS/BLADDER: No significant mass, stone, or hydronephrosis.    BOWEL: No " obstruction or inflammatory change.    LYMPH NODES: No lymphadenopathy.    VASCULATURE: No abdominal aortic aneurysm.    PELVIC ORGANS: 2.4 cm cystic lesion is seen within the right ovary.    MUSCULOSKELETAL: Mild degenerative changes are seen in the spine.      Impression    IMPRESSION:   1.  No acute abnormality is seen in the abdomen and pelvis.  2.  Diffuse hepatic steatosis.  3.  2.4 cm simple appearing cyst seen within the right ovary.   Results for orders placed or performed in visit on 12/14/23   Comprehensive metabolic panel     Status: Abnormal   Result Value Ref Range    Sodium 139 135 - 145 mmol/L    Potassium 4.2 3.4 - 5.3 mmol/L    Carbon Dioxide (CO2) 26 22 - 29 mmol/L    Anion Gap 12 7 - 15 mmol/L    Urea Nitrogen 5.7 (L) 6.0 - 20.0 mg/dL    Creatinine 0.87 0.51 - 0.95 mg/dL    GFR Estimate 86 >60 mL/min/1.73m2    Calcium 9.8 8.6 - 10.0 mg/dL    Chloride 101 98 - 107 mmol/L    Glucose 108 (H) 70 - 99 mg/dL    Alkaline Phosphatase 83 40 - 150 U/L    AST 68 (H) 0 - 45 U/L    ALT 59 (H) 0 - 50 U/L    Protein Total 7.3 6.4 - 8.3 g/dL    Albumin 4.3 3.5 - 5.2 g/dL    Bilirubin Total 0.4 <=1.2 mg/dL   Lipase     Status: Normal   Result Value Ref Range    Lipase 23 13 - 60 U/L   HCG qualitative urine     Status: Normal   Result Value Ref Range    hCG Urine Qualitative Negative Negative   UA with Microscopic reflex to Culture - Clinic Collect     Status: Abnormal    Specimen: Urine, Midstream   Result Value Ref Range    Color Urine Yellow Colorless, Straw, Light Yellow, Yellow    Appearance Urine Cloudy (A) Clear    Glucose Urine Negative Negative mg/dL    Bilirubin Urine Moderate (A) Negative    Ketones Urine 40 (A) Negative mg/dL    Specific Gravity Urine >=1.030 1.003 - 1.035    Blood Urine Negative Negative    pH Urine 5.5 5.0 - 7.0    Protein Albumin Urine 30 (A) Negative mg/dL    Urobilinogen Urine 1.0 0.2, 1.0 E.U./dL    Nitrite Urine Negative Negative    Leukocyte Esterase Urine Negative Negative    TSH with free T4 reflex     Status: Normal   Result Value Ref Range    TSH 1.54 0.30 - 4.20 uIU/mL   CBC with platelets and differential     Status: None   Result Value Ref Range    WBC Count 6.8 4.0 - 11.0 10e3/uL    RBC Count 5.16 3.80 - 5.20 10e6/uL    Hemoglobin 14.8 11.7 - 15.7 g/dL    Hematocrit 46.2 35.0 - 47.0 %    MCV 90 78 - 100 fL    MCH 28.7 26.5 - 33.0 pg    MCHC 32.0 31.5 - 36.5 g/dL    RDW 14.0 10.0 - 15.0 %    Platelet Count 276 150 - 450 10e3/uL    % Neutrophils 71 %    % Lymphocytes 19 %    % Monocytes 9 %    % Eosinophils 1 %    % Basophils 0 %    % Immature Granulocytes 0 %    Absolute Neutrophils 4.8 1.6 - 8.3 10e3/uL    Absolute Lymphocytes 1.3 0.8 - 5.3 10e3/uL    Absolute Monocytes 0.6 0.0 - 1.3 10e3/uL    Absolute Eosinophils 0.1 0.0 - 0.7 10e3/uL    Absolute Basophils 0.0 0.0 - 0.2 10e3/uL    Absolute Immature Granulocytes 0.0 <=0.4 10e3/uL   UA Microscopic with Reflex to Culture     Status: Abnormal   Result Value Ref Range    Bacteria Urine Many (A) None Seen /HPF    RBC Urine 0-2 0-2 /HPF /HPF    WBC Urine 5-10 (A) 0-5 /HPF /HPF    Squamous Epithelials Urine Many (A) None Seen /LPF    Narrative    Urine Culture not indicated   Urine Culture Aerobic Bacterial     Status: None    Specimen: Urine, Midstream   Result Value Ref Range    Culture 50,000-100,000 CFU/mL Mixture of urogenital teri    CBC with platelets and differential     Status: None    Narrative    The following orders were created for panel order CBC with platelets and differential.  Procedure                               Abnormality         Status                     ---------                               -----------         ------                     CBC with platelets and d...[[id number]]                      Final result                 Please view results for these tests on the individual orders.

## 2023-12-15 LAB — BACTERIA UR CULT: NORMAL

## 2023-12-15 NOTE — TELEPHONE ENCOUNTER
Spoke to patient and she states she feels better today.     She will contact us if she needs an appointment with Dr. Mackey. Patient was given her CT results.       Valencia Braga RN  [facility]

## 2023-12-15 NOTE — TELEPHONE ENCOUNTER
Provider Response to 2nd Level Triage Request    I have reviewed the RN documentation. My recommendation is:  Reviewed pt recent ADS work up. Please schedule UC follow up visit for further recommendations and discuss on CT scan findings - OK to use same day slot on my schedule in 2 weeks

## 2024-01-01 NOTE — PLAN OF CARE
"Flat affect but brightens with conversation. Reports that she is feeling better, and is more hopeful. During 1:1 she spoke about wanting to go back to school and start her generals and noted that her son had been bugging her for the last 6 months to go back. More visible during shift, out in lounge watching TV or playing cards with staff and a peer. States that her SI has lessened and is \"more mild\". Contracts for safety. Med compliant  " cough for several days, no fever

## 2024-01-05 NOTE — ANESTHESIA POSTPROCEDURE EVALUATION
"Silvia Bravo  1999  [id number]  Patient Care Team:  Mason Duarte MD as PCP - General (Internal Medicine)  Arpan Cornejo MD as Consulting Physician (Endocrinology)    Silvia Bravo is a 24 y.o. female here today for follow up.     This patient is accompanied by their self who contributes to the history of their care.    Chief Complaint:    Chief Complaint   Patient presents with    Anxiety     Needs medication refill        History of Present Illness:  I have reviewed and/or updated the patient's past medical, past surgical, family, social history, problem list and allergies as appropriate.     Was dx with adhd in 8th grade. Had been on stimulants prescribel by [facility], [facility] covering from cocaine addiction.  Last prescribed in feb 2023. Toleartes los dose vyvanse ( 20 mg. She is also on trazodone 100 mg. Lamictal 50 mg daily thru [email or web address]. Currently living in sober living. She can focus, less hyper and more calm her weight is stabilized.  She is very active in her recovery meetings.      Review of Systems   Constitutional: Negative.    Eyes: Negative.    Psychiatric/Behavioral:  Positive for decreased concentration.        Vitals:    01/05/24 1237   BP: 120/78   Weight: 57.3 kg (126 lb 6.4 oz)   Height: 154.9 cm (60.98\")     Body mass index is 23.9 kg/m².    Physical Exam  Vitals and nursing note reviewed.   Constitutional:       General: She is not in acute distress.     Appearance: She is well-developed. She is not diaphoretic.   HENT:      Head: Normocephalic and atraumatic.      Right Ear: External ear normal.      Left Ear: External ear normal.      Mouth/Throat:      Pharynx: No oropharyngeal exudate.   Eyes:      General: No scleral icterus.        Right eye: No discharge.      Conjunctiva/sclera: Conjunctivae normal.   Neck:      Thyroid: No thyromegaly.      Vascular: No JVD.      Trachea: No tracheal deviation. " Patient: Misty Parham    * No procedures listed *    Diagnosis:* No pre-op diagnosis entered *  Diagnosis Additional Information: No value filed.    Anesthesia Type:  General    Note:  Anesthesia Post Evaluation    Patient location during evaluation: PACU  Patient participation: Able to fully participate in evaluation  Level of consciousness: awake  Pain management: adequate  Airway patency: patent  Cardiovascular status: acceptable  Respiratory status: acceptable  Hydration status: acceptable  PONV: none     Anesthetic complications: None          Last vitals:  Vitals:    03/02/20 0535 03/02/20 0733 03/02/20 0735   BP: 115/79 104/56 103/58   Pulse:   89   Resp: 16 10 13   Temp: 37.2  C (98.9  F)     SpO2: 100% 99% 98%         Electronically Signed By: Kendall Hawk MD  March 2, 2020  7:43 AM     Cardiovascular:      Rate and Rhythm: Normal rate and regular rhythm.      Heart sounds: Normal heart sounds.      Comments: PMI nondisplaced  Pulmonary:      Effort: Pulmonary effort is normal.      Breath sounds: Normal breath sounds. No wheezing or rales.   Abdominal:      General: Bowel sounds are normal.      Palpations: Abdomen is soft.      Tenderness: There is no abdominal tenderness. There is no guarding or rebound.   Musculoskeletal:      Cervical back: Normal range of motion and neck supple.   Lymphadenopathy:      Cervical: No cervical adenopathy.   Skin:     General: Skin is warm and dry.      Capillary Refill: Capillary refill takes less than 2 seconds.      Coloration: Skin is not pale.      Findings: No rash.   Neurological:      Mental Status: She is alert and oriented to person, place, and time.      Motor: No abnormal muscle tone.      Coordination: Coordination normal.   Psychiatric:         Judgment: Judgment normal.         Procedures    Results Review:    None    Assessment/Plan:    Problem List Items Addressed This Visit       Attention deficit hyperactivity disorder (ADHD), combined type - Primary    Current Assessment & Plan     Psychological condition is worsening.  Will resume her stimulant therapy with Vyvanse 20 mg daily.  She should continue psychologic counseling.  UDS CSA requested PDMP reviewed  Psychological condition  will be reassessed in 3 months.         Relevant Medications    lisdexamfetamine (Vyvanse) 20 MG capsule    traZODone (DESYREL) 100 MG tablet       Plan of care reviewed with patient at the conclusion of today's visit. Education was provided regarding diagnosis and management.  Patient verbalizes understanding of and agreement with management plan.    Return in about 3 months (around 4/5/2024).    Mason Duarte MD      Please note than portions of this note were completed [facility] a Voice Recognition Program

## 2024-01-31 ENCOUNTER — HOSPITAL ENCOUNTER (INPATIENT)
Facility: CLINIC | Age: 39
LOS: 12 days | Discharge: PSYCHIATRIC OR SUBSTANCE USE FACILITY | DRG: 178 | End: 2024-02-15
Attending: EMERGENCY MEDICINE | Admitting: HOSPITALIST
Payer: MEDICARE

## 2024-01-31 DIAGNOSIS — U07.1 COVID-19: ICD-10-CM

## 2024-01-31 DIAGNOSIS — F60.3 BORDERLINE PERSONALITY DISORDER (H): ICD-10-CM

## 2024-01-31 DIAGNOSIS — R45.851 SUICIDAL IDEATION: ICD-10-CM

## 2024-01-31 DIAGNOSIS — F33.2 SEVERE EPISODE OF RECURRENT MAJOR DEPRESSIVE DISORDER, WITHOUT PSYCHOTIC FEATURES (H): ICD-10-CM

## 2024-01-31 PROBLEM — F32.A DEPRESSION, UNSPECIFIED DEPRESSION TYPE: Status: ACTIVE | Noted: 2024-01-31

## 2024-01-31 PROCEDURE — G0378 HOSPITAL OBSERVATION PER HR: HCPCS

## 2024-01-31 PROCEDURE — 250N000013 HC RX MED GY IP 250 OP 250 PS 637

## 2024-01-31 PROCEDURE — 99223 1ST HOSP IP/OBS HIGH 75: CPT

## 2024-01-31 PROCEDURE — 99285 EMERGENCY DEPT VISIT HI MDM: CPT | Mod: 25

## 2024-01-31 RX ORDER — ZOLPIDEM TARTRATE 5 MG/1
5 TABLET ORAL
Status: DISCONTINUED | OUTPATIENT
Start: 2024-01-31 | End: 2024-02-07

## 2024-01-31 RX ORDER — OLANZAPINE 5 MG/1
10 TABLET ORAL AT BEDTIME
Status: DISCONTINUED | OUTPATIENT
Start: 2024-01-31 | End: 2024-02-15 | Stop reason: HOSPADM

## 2024-01-31 RX ORDER — VENLAFAXINE HYDROCHLORIDE 150 MG/1
150 CAPSULE, EXTENDED RELEASE ORAL DAILY
Status: DISCONTINUED | OUTPATIENT
Start: 2024-02-01 | End: 2024-02-01

## 2024-01-31 RX ORDER — DOCUSATE SODIUM 100 MG/1
100 CAPSULE, LIQUID FILLED ORAL 2 TIMES DAILY PRN
Status: DISCONTINUED | OUTPATIENT
Start: 2024-01-31 | End: 2024-02-15 | Stop reason: HOSPADM

## 2024-01-31 RX ORDER — ALPRAZOLAM 0.5 MG
1 TABLET ORAL 3 TIMES DAILY PRN
Status: DISCONTINUED | OUTPATIENT
Start: 2024-01-31 | End: 2024-02-09

## 2024-01-31 RX ADMIN — OLANZAPINE 10 MG: 5 TABLET, FILM COATED ORAL at 21:46

## 2024-01-31 RX ADMIN — ALPRAZOLAM 1 MG: 0.5 TABLET ORAL at 17:11

## 2024-01-31 ASSESSMENT — ACTIVITIES OF DAILY LIVING (ADL)
ADLS_ACUITY_SCORE: 37

## 2024-01-31 NOTE — ED NOTES
[facility]  ED to EMPATH Checklist:      Goal for EMPATH: Suicidality    Current Behavior: Withdrawn and Calm    Safety Concerns: Suicidal, with a plan to overdose with pills    Legal Hold Status: [facility]    Medically Cleared by ED provider: Yes    Patient Therapeutically Searched: Therapeutic search by ED staff (strings, belts, shoes, pockets, electronics, etc.); belongings in locker    Belongings: In room locker    Independent Ambulation at Baseline: Yes/No: Yes    Participates in Care/Conversation: Yes/No: Yes    Patient Informed about EMPATH: Yes/No: Yes    DEC: Not ordered    Patient Ready to be Transferred to EMPATH? Yes/No: Yes

## 2024-01-31 NOTE — PROGRESS NOTES
"Pt met with LMHP and reports feeling \"aggravated\" afterwards. Pt states LMHP recommended staying in hospital and pt does not agree. Pt leg tapping and tense in recliner. PRN alprazolam given (see MAR). Pt agreeable to meeting with provider for final disposition.  "

## 2024-01-31 NOTE — ED NOTES
Spoke with  Henrietta, from [facility], [phone]. For any discharge help; She seen her yesterday. Pt would have vague thoughts of SI. Could not contract for safety. Pt has great support, according to came manager. Does also have ACP AIM support

## 2024-01-31 NOTE — ED TRIAGE NOTES
"Pt told sister that she stopped her Zyprexa months ago; sister told pt to notify psychiatrist; pt text psychiatrist \"If I die will I go to heaven or hell. I am going to see the angels soon\" and than she stop responding; MD called EMS. Pt lives with parents. When EMS showed up, parents were unaware of these thought or that she was texting her doctor. Pt was in the basement on her cell phone and was reluctant to come in.        "

## 2024-01-31 NOTE — ED PROVIDER NOTES
"  History     Chief Complaint:  Suicidal       HPI   Misty Parham is a 38 year old female with a history of depression, BPD, auditory hallucinations, SI, and IVY who presents to the ED with suicidal ideation. She has had intermittent mood swings lately, and today she states, \"she is not afraid to die.\" She has taken all of her prescribed mediations besides her Zyprexa, which she stopped 3 months ago. Her sister found out about a week ago that she stopped taking this and told her to text her psychiatrist at this time.  She has an appointment scheduled with her psychiatrist tomorrow. She has had auditory hallucinations of \"Satan telling me to kill myself\" in the past, but denies any current hallucinations. She has been more constipated lately and is taking Miralax and fiber.      Independent Historian:   EMS reports that they were called by her therapist/psychiatrist. She lives at home with her mom and step-dad. Her psychiatrist received a text from her saying \"If I die will I go to heaven or hell\" and \"I am going to see angels today.\" This prompted concern for her therapist to call 911.    Review of External Notes:  None    Allergies:  No Known Allergies     Listed Medications:    Xanax  Abilify  Klonopin  Atarax  Lamictal  Effexor XR  Topamax  Zyprexa    Past Medical and Surgical History:    Depression  Anxiety  SI  BPD  Cholecystectomy     Family History:    family history includes Diabetes in her father.    Social History:   reports that she has never smoked. She has never used smokeless tobacco. She reports that she does not currently use alcohol. She reports that she does not use drugs.      Physical Exam   Patient Vitals for the past 24 hrs:   BP Pulse SpO2   01/31/24 1213 120/83 108 94 %        General: Resting on the gurney, watching a music video on her phone. Poor eye contact, flat.   Head:  The scalp, face, and head appear normal  Eyes:  The pupils are equal, round, and reactive to light    There is no " nystagmus    Extraocular muscles are intact    Conjunctivae and sclerae are normal  ENT:    The nose is normal  Dry mucus membranes    Pinnae are normal    The oropharynx is normal  Neck:  Normal range of motion    There is no rigidity noted  CV:  Regular rate  Normal underlying rhythm     Normal S1/S2    No pathological murmur detected  Resp:  Lungs are clear    There is no tachypnea    Non-labored    No rales    No wheezing   GI:  Abdomen is soft, there is no rigidity    No distension/tympani    No rebound tenderness     Non-surgical without peritoneal features at this time  MS:  Normal muscular tone    Symmetric motor strength    No major joint effusions    No asymmetric leg swelling, no calf tenderness  Skin:  No rash or acute skin lesions noted  Neuro:  Speech is normal and fluent, there is no aphasia    No motor deficits    Cranial nerves are intact  Psych: Awake. Alert.      Flat affect.  Appropriate interactions. Intermittent suicidal thoughts, but no plan articulated. No hallucinations. No acute thought disorder.              Emergency Department Course       Emergency Department Course & Assessments:           Independent Interpretation of Radiology Studies by Dr. Mensah      Assessments/Consultations/Discussion of Management:  ED Course as of 01/31/24 1218   Wed Jan 31, 2024   1210 I obtained history and examined the patient, as noted above.         Disposition:  [facility]    Impression & Plan        Medical Decision Making:  Patient presents to the emergency department with a history of depression, suicidal ideation, anxiety, borderline personality disorder, and psychotic features in the past who has not been on her Zyprexa for a number of months.  She was texting her psychiatrist earlier today and the dialogue was consistent with 1 that could be interpreted that the patient was having significant suicidal ideation and perhaps planning.  Apparently when the psychiatrist texted back, the patient did not  respond which led to a phone call to the police to go out to the house to check on the patient.  The patient does have a history of borderline personality disorder in addition to her serious underlying depression and anxiety.  She is not currently compliant with all of her medications.  She is medically clear and stable at this time and a good candidate for the empath unit.        Diagnosis:    ICD-10-CM    1. Depression, unspecified depression type  F32.A       2. Borderline personality disorder (H)  F60.3                Scribe Disclosure:  Ziyad LEBLANC, am serving as a scribe at 12:23 PM on 1/31/2024 to document services personally performed by Drew Mensah MD based on my observations and the provider's statements to me.   1/31/2024   Drew Mensah MD Rock, Michael P, MD  01/31/24 1247

## 2024-01-31 NOTE — ED NOTES
Video Observation initiated, patient informed.     Jimbo Ramos RN    Pt was searched by RN and security

## 2024-01-31 NOTE — PROGRESS NOTES
"38 year old female with history of MDD, borderline personality disorder, past suicide attempts (last overdose 1 year ago) received from ED due to worsening depression and suicidal ideation. Reports she stopped taking her zyprexa 3 months ago due to weight gain and has been increasingly depressed since then. Today, she texted her psychiatrist  [facility]\" which prompted 911 to be called for wellness check and pt was brought to hospital. Pt states she has been taking all medications other than zyprexa as prescribed. Per ED note, pt endorsed AH hearing satan tell her to kill herself though she denies AH in Emp[facility] intake. Pt states \"I want to go home, they never should have brought me here\".     Flat, blunted affect. Minimal eye contact. Very soft spoken, slow to respond to questions.      Nursing and risk assessments completed. Assessments reviewed with LMHP and physician. Admission information reviewed with patient. Patient given a tour of [facility] and instructions on using the facility. Questions regarding [facility] addressed. Pt safety search completed.     "

## 2024-01-31 NOTE — ED NOTES
Bed: ED20  Expected date:   Expected time:   Means of arrival:   Comments:  Hems 435 38 F SI on a hold eta 1200

## 2024-02-01 ENCOUNTER — MEDICAL CORRESPONDENCE (OUTPATIENT)
Dept: EMERGENCY MEDICINE | Facility: CLINIC | Age: 39
End: 2024-02-01
Payer: MEDICARE

## 2024-02-01 PROCEDURE — 250N000013 HC RX MED GY IP 250 OP 250 PS 637

## 2024-02-01 PROCEDURE — G0378 HOSPITAL OBSERVATION PER HR: HCPCS

## 2024-02-01 PROCEDURE — 99233 SBSQ HOSP IP/OBS HIGH 50: CPT | Performed by: PSYCHIATRY & NEUROLOGY

## 2024-02-01 RX ADMIN — ZOLPIDEM TARTRATE 5 MG: 5 TABLET ORAL at 21:05

## 2024-02-01 RX ADMIN — OLANZAPINE 10 MG: 5 TABLET, FILM COATED ORAL at 21:05

## 2024-02-01 RX ADMIN — ALPRAZOLAM 1 MG: 0.5 TABLET ORAL at 09:42

## 2024-02-01 RX ADMIN — VENLAFAXINE HYDROCHLORIDE 150 MG: 150 CAPSULE, EXTENDED RELEASE ORAL at 08:43

## 2024-02-01 ASSESSMENT — ACTIVITIES OF DAILY LIVING (ADL)
ADLS_ACUITY_SCORE: 37

## 2024-02-01 ASSESSMENT — COLUMBIA-SUICIDE SEVERITY RATING SCALE - C-SSRS
TOTAL  NUMBER OF ABORTED OR SELF INTERRUPTED ATTEMPTS SINCE LAST CONTACT: NO
6. HAVE YOU EVER DONE ANYTHING, STARTED TO DO ANYTHING, OR PREPARED TO DO ANYTHING TO END YOUR LIFE?: NO
REASONS FOR IDEATION SINCE LAST CONTACT: COMPLETELY TO END OR STOP THE PAIN (YOU COULDN'T GO ON LIVING WITH THE PAIN OR HOW YOU WERE FEELING)
ATTEMPT SINCE LAST CONTACT: NO
5. HAVE YOU STARTED TO WORK OUT OR WORKED OUT THE DETAILS OF HOW TO KILL YOURSELF? DO YOU INTEND TO CARRY OUT THIS PLAN?: YES
TOTAL  NUMBER OF INTERRUPTED ATTEMPTS SINCE LAST CONTACT: NO
SUICIDE, SINCE LAST CONTACT: NO
1. SINCE LAST CONTACT, HAVE YOU WISHED YOU WERE DEAD OR WISHED YOU COULD GO TO SLEEP AND NOT WAKE UP?: YES
2. HAVE YOU ACTUALLY HAD ANY THOUGHTS OF KILLING YOURSELF?: YES

## 2024-02-01 NOTE — ED PROVIDER NOTES
"EmPATH Unit - Initial Psychiatric Observation Note  [facility] Emergency Department    Misty Parham MRN: [id number]   Age: 38 year old YOB: 1985     History     Chief Complaint   Patient presents with    Suicidal     HPI  Misty Parham is a 38 year old female with history notable for major depressive disorder severe with psychotic feature, borderline personality disorder, and generalized anxiety disorder who presented to the ED with worsening depressive symptoms and suicidal thoughts in the context of medication noncompliance. Patient reports that she stopped taking her zyprexa 3 months ago due to concerns for weight gain. In the emergency department, this patient was determined to be medically stable and transferred to the EmPATH unit for psychiatric assessment.  Record review indicates extensive mental health history with several past hospitalizations and previous commitment.They have currently been in the emergency department for 7 hours.     Misty tells me that she's upset with other people trying to make decisions for her. She tells me that she's having suicidal thoughts but tells me she always has these and isn't afraid to die. She sent a message to her outpatient psychiatrist today via [employer] asking if she does would she go to [facility] or [facility]. She tells me she was curious what others think of her, she denies that there was intent behind this. She tells me that she met with her  yesterday and told her that she had not been taking her zyprexa. Her  called [facility] but Misty refused to talk to [facility]. This was \"annoying\" to her so she started having thoughts about overdosing on her Trazodone. She tells me that she often has thoughts about overdosing on OTC medications, these have come and gone for years. She has an appointment with her regular psychiatrist tomorrow and does not want to make additional medication changes until she talks with him. She denies AH/VH. She has not been " "sleeping well since stopping her Zyprexa. She tells me that she tosses and turns at night. She has ambien and Trazodone prn but feels too sedated when she takes both of them. I did discuss considering adding back low dose of zyprexa and retitrating to previous effetive dose or adding in lamictal instead to further target mood and depressive symtpoms. She declines stating she wants to follow-up with her outpatient psych provider, WILLIAN Marquez CNP through [facility] [place][note 2].    Of note, she also tells me that she graduated from her DBT program on 1/23/24 that she used to go to 3x per week. This has been a change for her as she used to go to groups. She struggles to use her DBT skills when not engaged in groups. She has not seen her individual therapist recently. She has also not been able to see her sons as much, ages 17 and 20. She lives with her parents and sees her sister often. Her sister sets up her medications for her 2 weeks at a time and she is then given her pill box to take her medications. She tells me she wants to go home and that she wants people to leave her alone. She tells me she always feels suicidal. I discussed that in order to go home she would need to engage in safety planning. She was then agreeable to allow [facility] to call mom to safety plan, specifically restrict access to medications. She also notes that she does not have keys to get into her parents house where she resides. By the end of the conversation, Misty did appear more future oriented discussing wanting to see her sons. She did alude to DBT being helpful, I encouraged her to utilize her coping skills. She is open to a referral for individual DBT. [facility] Goldie Malagon attempted to call mom to safety plan, message left for mom requesting call back.     Record review from last psychiatry note on 11/30/23 by Betty Cazares states \"Patient had hx of hearing voices and chronic suicidal thoughts with plan to OD on OTC, but she " "denied any suicidal thoughts and auditory hallucination \"those are gone I needed to be there for my kids.\"  Record review from APS note on 4/30/2022 by Jorge Pennington at [facility] states  \"Patient has chronic suicidal ideation with plan to buy sleep aid from pharmacy and overdose on it.\"    As of 8:48pm mom had not returned call back. Patient is agreeable to staying overnight at [facility] and resuming bedtime zyprexa dosing with plan to reassess tomorrow.     Past Medical History  Past Medical History:   Diagnosis Date    Depressive disorder     Generalized anxiety disorder 5/15/2020     Past Surgical History:   Procedure Laterality Date    CHOLECYSTECTOMY       ALPRAZolam (XANAX) 1 MG tablet  docusate sodium (COLACE) 100 MG capsule  melatonin 3 MG tablet  OLANZapine (ZYPREXA) 10 MG tablet  polyethylene glycol (MIRALAX) 17 GM/Dose powder  venlafaxine (EFFEXOR XR) 150 MG 24 hr capsule  Vitamin D, Cholecalciferol, 25 MCG (1000 UT) CAPS  zolpidem ER (AMBIEN CR) 12.5 MG CR tablet  ARIPiprazole (ABILIFY) 10 MG tablet  clonazePAM (KLONOPIN) 1 MG tablet  hydrOXYzine (ATARAX) 50 MG tablet  lamoTRIgine (LAMICTAL) 25 MG tablet  OLANZapine (ZYPREXA) 20 MG tablet  OLANZapine (ZYPREXA) 5 MG tablet  topiramate (TOPAMAX) 100 MG tablet      No Known Allergies  Family History  Family History   Problem Relation Age of Onset    Diabetes Father      Social History   Social History     Tobacco Use    Smoking status: Never    Smokeless tobacco: Never   Vaping Use    Vaping Use: Never used   Substance Use Topics    Alcohol use: Not Currently    Drug use: Never          Review of Systems  A medically appropriate review of systems was performed with pertinent positives and negatives noted in the HPI, and all other systems negative.    Physical Examination   BP: 120/83  Pulse: 108  Temp: 98.3  F (36.8  C)  Resp: 16  Height: 165.1 cm (5' 5\")  Weight: 140.6 kg (310 lb)  SpO2: 94 %    Physical Exam  General: Appears stated age.   Neuro: " Alert and fully oriented. Extremities appear to demonstrate normal strength on visual inspection.   Integumentary/Skin: no rash visualized, normal color    Psychiatric Examination   Appearance: awake, alert, adequately groomed, appeared as age stated, and casually dressed  Attitude:  somewhat cooperative  Eye Contact:  fair  Mood:  depressed  Affect:  mood congruent and intensity is flat  Speech:  clear, coherent and normal prosody  Psychomotor Behavior:  no evidence of tardive dyskinesia, dystonia, or tics and intact station, gait and muscle tone  Thought Process:  linear  Associations:  no loose associations  Thought Content:  no evidence of psychotic thought and passive suicidal ideation present  Insight:  fair  Judgement:  fair  Oriented to:  time, person, and place  Attention Span and Concentration:  fair  Recent and Remote Memory:  fair  Language: able to name/identify objects without impairment  Fund of Knowledge: intact with awareness of current and past events    ED Course     ED Course as of 01/31/24 1829   Wed Jan 31, 2024   1210 I obtained history and examined the patient, as noted above.       Labs Ordered and Resulted from Time of ED Arrival to Time of ED Departure - No data to display    Assessments & Plan (with Medical Decision Making)   Patient presenting with increasing depressive symptoms and suicidal ideation in the context of medication nonadherence. Patient reports stopping their Zyprexa following their last psych appointment on 11/30/23. Record review indicates chronic suicidal thoughts with frequent plans to overdose on OTC medications. Following additional discussion with patient, there have been several significant changes including graduation from DBT program and not seeing her sons as much, which could be contributing to current presentation. Patient reports minimal use of DBT skills over the past week. Although patient does have a history of chronic suicidal thoughts with plans to  overdose she did express specific plans to overdose on trazodone. [facility] was unable to get in touch with parents to engage in safety planning and patient was unwilling to engage in safety planning with staff. Patient was agreeable to remaining at [facility] overnight with reassessment tomorrow. Treatment plan focused on spending time in the therapeutic milieu allowing for a decrease in intensity of symptoms. She was agreeable to resuming bedtime zyprexa dosing further targeting mood stabilization and sleep. Remain at [facility] overnight with reassessment and likely discharge tomorrow. Nursing notes reviewed noting no acute issues.     I have reviewed the assessment completed by the [facility].     Preliminary diagnosis:    ICD-10-CM    1. Depression, unspecified depression type  F32.A       2. Borderline personality disorder (H)  F60.3            Treatment Plan:  -Continue PTA medications  -Resume zyprexa 10mg at bedtime further targeting mood stabilization and sleep  -Follow-up with established outpatient medication provider   -Patient would benefit from additional psychotherapy and DBT, consider indiviudal DBT as patient recently gradated from group DBT program  -Individual psychotherapy and outpatient psychiatric care recommended for additional support and ongoing development of nonpharmacologic coping skills and strategies.    -Problem focused supportive therapy and education provided today related to patient's current and acute stressors, symptoms, and diagnoses.  -Remain at [facility] under observation with reassessment and anticipated discharge tomorrow     --  WILLIAN Bryson CNP   [facility] EMERGENCY DEPT  EmPATH Unit       Deborah Smith APRN CNP  01/31/24 2052       Deborah Smith APRN CNP  01/31/24 2123

## 2024-02-01 NOTE — CONSULTS
[facility] COURT- PROBATE/MENTAL HEALTH DIVISION  [facility] ([place])    _________________________________________________________________________  PETITION FOR JUDICIAL COMMITMENT    In the Matter of the Civil Commitment of:   Misty TEJADA Prasad  1985    D.C. File No. [id number]__________   C.A. File No. _____________     DANILO Crawford ,MS, DANILO of [facility] is interested in the respondent as a Licensed Mental Health Professional, and to the best of his/her knowledge, information, and belief, petitioner respectfully represents:     1. Respondent was born on 1985    2. Respondent lives at [address] [place] [address]  and has residence in [place], [place] for the purpose of judicial commitment;     3. Respondent s spouse and nearest jie are:   NAME/RELATIONSHIP: Mona (Mother)   CONTACT INFO:   [phone]     NAME/RELATIONSHIP: Marleny Núñez ()   CONTACT INFO:   [phone]       4. The observations of respondent s behavior showing that respondent is Mentally Ill  and there is no suitable alternative to involuntary commitment are set forth in Exhibit A and the examiner s statement attached.     5. An examiner s statement supporting respondent s commitment is attached.     WHEREFORE, Petitioner prays the Court commit the respondent to the Commissioner of Human Services, or other treatment facility according to law.     I declare under penalty of perjury under the laws of the [facility] that the foregoing is true and correct.     Executed on February 1, 2024 in the [facility]  in the [facility].         Signature: ________________________________           Examiner s name: DANILO Crawford MS, DANILO          [facility] EMERGENCY DEPT  [address] [facility] [address] [facility]  [id number]  [phone]

## 2024-02-01 NOTE — ED NOTES
"Pt has spent the majority of the shift resting on the recliner watching tv. She has been observed constantly moving her foot back and forth. Pt presents as depressed in mood with a flat affect, she is withdrawn and soft spoken. Pt agreeable with the plan to stay on observation status tonight. Pt took scheduled HS medication and was cooperative with vitals taken. When asked how she is doing, Pt replies, \"I don't know.\"   "

## 2024-02-01 NOTE — PROGRESS NOTES
"Patient refused to eat or drink today, I spend time with her talking and encouraging to eat, she stated  \" I just want to die and I want to know how long does it take to die from dehydration\".  Patient does not want to stay in the Hospital and wants to go home.   We talked about her family and sons and she believes her family is probably disappointed at her and the best thing for her would be to prepare for her death.  "

## 2024-02-01 NOTE — CONSULTS
"[facility] COURT- PROBATE/MENTAL HEALTH DIVISION  [facility] ([place])     COURT FILE NO._____________________   ________________________________________________________________________      In the Matter of the Civil Commitment of: Misty Parham                              EXHIBIT A  The following observations of the patient s behavior provide a factual basis for believing the patient is:  Mentally Ill  and is in need of hospitalization.    Ms. Parham is a 38 year old female with a psychiatric history of borderline personality disorder, anxiety, depression, and auditory hallucinations. Ms. Parham presented to the emergency department due to concern from outpatient psychiatric provider. Ms. Parham reached out to her provider after appointment and endorsing suicidal ideations, texting her psychiatrist, \"If I die will I go to heaven or hell?\"  Ms. Parham presents with active suicidal ideations with plan to overdose on her prescribed medications and has not been able to safety plan with hospital staff. Ms. Parham endorses that she is \"not going to eat or drink\" in the hospital as a form of suicide and Ms. Parham has been demanding to leave the hospital so that she can act on her thoughts of suicide. Ms. Parham stopped her olanzapine about 3 months ago. Ms. Parham has a history of past mental health admissions and history of civil commitment. Per collateral there has been some concern for Ms. Parham suicidal ideations and her depressive symptoms.       Person completing Exhibit A: DANILO Crawford MS, [facility]    Title: Licensed Mental Health Professional     Signature:_______________________________     Date: February 1, 2024   [facility] EMERGENCY DEPT  [address] [place] [id number]  [phone]    "

## 2024-02-01 NOTE — PROGRESS NOTES
"  Triage and Transition Services Extended Care Reassessment     Patient: Misty goes by \"Misty,\" uses she/her pronouns  Date of Service: February 1, 2024  Site of Service: [facility] EMERGENCY DEPT                             EMP02  Patient was seen yes  Mode of Assessment: In person     Reason for Reassessment:   suicidal ideation    History of Patient's Original Emergency Room Encounter: Pt reports history of borderline personality disorder, anxiety, depression, and auditory hallucinations. Pt reports she has been to the ED and engage in psychiatric inpatient services. Pt reports that she has never attempted suicide, but has had plans to overdose before. Pt reports that suicide ideation is chronic for her, and she wavers on intent to kill herself. Pt denies history of self-harm.    Current Patient Presentation: Pt presented with a flat and depressed affect. Pts mood was congruent with this presentation. Pt was oriented x4. Pt was cooperative with assessment but was at times minimally responsive. Pt endorsed ongoing SI. Pt endorsed current plan to overdose on medications or to starve herself in the hospital. Pt was not able to safety plan with writer. Writer attempted to explore less restrictive alternatives and Pt endorsed that she did not want to engage in any supports. Pt endorsed that she wanted to die and it was her \"choice.\" Pt did not endorse any SIB/HI or AH/VH. Pt endorsed a hx of command AH telling her to kill herself. Pt has not been taking her medications for about 3 months. Per collateral there has been concern about Pts functioning and increased SI.      Changes Observed Since Initial Assessment: increase in presenting symptoms    Therapeutic Interventions Provided: Engaged in guided discovery, explored patient's perspectives and helped expand them through socratic dialogue., Discussed and practiced mindfulness., Identified and practiced coping skills., Reviewed healthy living that " supports positive mental health, including looking at sleep hygiene, regular movement, nutrition, and regular socialization.    Current Symptoms: anxious apathy, avoidance, helplessness, hoplessness, thoughts of death/suicide anxious   loss of appetite    Mental Status Exam   Affect: Flat  Appearance: Disheveled  Attention Span/Concentration: Attentive  Eye Contact: Variable    Fund of Knowledge: Appropriate   Language /Speech Content: Fluent  Language /Speech Volume: Soft  Language /Speech Rate/Productions: Minimally Responsive  Recent Memory: Intact  Remote Memory: Intact  Mood: Apathetic, Depressed  Orientation to Person: Yes   Orientation to Place: Yes  Orientation to Time of Day: Yes  Orientation to Date: Yes     Situation (Do they understand why they are here?): Yes  Psychomotor Behavior: Normal  Thought Content: Suicidal  Thought Form: Intact, Goal Directed    Treatment Objective(s) Addressed: rapport building, processing feelings, identifying additional supports, identifying treatment goals    Patient Response to Interventions: needs reinforcement, unacceptance expressed    Progress Towards Goals:  Patient Reports Symptoms Are: worsening  Patient Progress Toward Goals: is not making progress  Comment: Pt has been somewaht receptive to ED interventions.  Next Step to Work Toward Discharge: symptom stabilization, patient ability to engage in safety planning  Symptom Stabilization Comment: Pt presents with worsening depression and SI  Ability to Engage Comment: Pt was not able to safety plan with writer    Case Management: Case Management Included: collaborating with patient's support system  Details on Collaborating with Patient's Support System: Writer talked to Pts mother Mona. Pt also has a  Marleny Núñez # [phone]  Summary of Interaction: Mona endorsed that Pt has been living with her and Pt has not disclosed any SI but has a hx of not telling her everything. Pt has been depressed and not  "taking her medications.    C-SSRS Since Last Contact:   1. Wish to be Dead (Since Last Contact): Yes  Wish to be Dead Description (Since Last Contact): Pt endorsed SI w/plans and intent  2. Non-Specific Active Suicidal Thoughts (Since Last Contact): Yes  Non-Specific Active Suicidal Thought Description (Since Last Contact): Pt endorsed \"many\" plans to end her life  4. Active Suicidal Ideation with Some Intent to Act, Without Specific Plan (Since Last Contact): No  5. Active Suicidal Ideation with Specific Plan and Intent (Since Last Contact): Yes  Active Suicidal Ideation with Specific Plan and Intent Description (Since Last Contact): Pt endorsed \"many\" plans to end her life  Most Severe Ideation Rating (Since Last Contact): 5  Frequency (Since Last Contact): Many times each day  Duration (Since Last Contact): More than 8 hours/persistent or continuous  Controllability (Since Last Contact): Unable to control thoughts  Deterrents (Since Last Contact): Deterrents definitely did not stop you  Reasons for Ideation (Since Last Contact): Completely to end or stop the pain (You couldn't go on living with the pain or how you were feeling)  Actual Attempt (Since Last Contact): No  Has subject engaged in non-suicidal self-injurious behavior? (Since Last Contact): No  Interrupted Attempts (Since Last Contact): No  Aborted or Self-Interrupted Attempt (Since Last Contact): No  Preparatory Acts or Behavior (Since Last Contact): No  Suicide (Since Last Contact): No     Calculated C-SSRS Risk Score (Since Last Contact): High Risk    Plan: Final Disposition / Recommended Care Path: inpatient mental health  Plan for Care reviewed with assigned Medical Provider: yes  Plan for Care Team Review: provider, RN  Patient and/or validated legal guardian concurs: no (72 HH)  Clinical Substantiation: At this time IP MH admission is being recommended due to SI w/plan and intent. Pts current sx appear to be impacting her ability to safety and " approprietly function in the community. Pt was not able to fully safety plan with writer. Pt does appear to be at higher risk of death by suicide accidental or intentional due to mental health hx and substance use sx. If Pt is able to effectively safety plan and/or Pts sx improve it would be beneficial to pursue a less restrictive alternative. Pt was placed on a 72 hour hold on 2/1/24 at 1147. Hospital staff have petitioned for MI civil commitment. Hospital staff are awaiting court determination.    Legal Status: Legal Status at Admission: 72 Hour Hold  72 Hour Hold - Date/Time Initiated: 2/1/24 at 1147  72 Hour Hold - Date/Time Ends: 2/7/24 at 1147    Session Status: Time session started: 0900  Time session ended: 0920  Session Duration (minutes): 20 minutes  Session Number: 1  Anticipated number of sessions or this episode of care: 4    Session Start Time: 0900  Session Stop Time: 0920  CPT codes: 22908 - Psychotherapy (with patient) - 30 (16-37*) min  Time Spent: 20 minutes      CPT code(s) utilized: 64758 - Psychotherapy (with patient) - 30 (16-37*) min    Diagnosis:   Patient Active Problem List   Diagnosis Code    Depression F32.A    Suicidal ideation R45.851    Depression with suicidal ideation F32.A, R45.851    Major depression F32.9    Suicidal behavior R45.89    Severe episode of recurrent major depressive disorder, without psychotic features (H) F33.2    Borderline personality disorder (H) F60.3    Facial cellulitis L03.211    Auditory hallucination R44.0    Psychosis, unspecified psychosis type (H) F29    Morbid obesity (H) E66.01    Chronic insomnia F51.04    Depression, major, recurrent, in partial remission (H24) F33.41    Generalized anxiety disorder F41.1    Depression, unspecified depression type F32.A       Primary Problem This Admission: Active Hospital Problems    Depression, unspecified depression type      *Borderline personality disorder (H)        Toshia Chen, [facility]   Licensed [facility]  Health Professional ([facility]), Extended Care  [phone]

## 2024-02-01 NOTE — PLAN OF CARE
Misty Parham  January 31, 2024  Plan of Care Hand-off Note     Patient Care Path: observation    Plan for Care:   A lower level of care has been unsuccessful in treating and stabilizing patient s mental health symptoms because of Pt was unable to identify supports she can talk to when struggling, reports it's hard for her to ask for help, lacks consistent skills she can use, and is unsure about her parents managing her medications; to restrict her access to means to harm herself. Pt has suicide ideation with plans, means, and inconistent intent to harm herself. However, with brief observation, monitored therapeutic treatment, and intervention of mental health symptoms in the EMPATH, symptoms may be mitigated with potential for disposition to a less restrictive level of care than an inpatient setting. Patient is not currently on the inpatient worklist. Next steps in care include pt will remain in ED overnight until collateral can be reached and pt can better engage in safety planning. Extended Care will follow, working to address coordination with pt family for safety planning, pt ability to engage in safety planning, and assist in scheduling an appointment for outpatient therapy, possibly referral to DBT program.    Identified Goals and Safety Issues: Pt in need of adequate safety plan, including coordination with parents about restricting access to medciations, as this is pt suicide plan. Collateral unable to coordinate with at time of assessment, voicemail left. Pt appears to lack skills. In need of therapy appointment and possible DBT referral.    Overview:  JENNIFER ([phone]            Legal Status: Legal Status at Admission: Voluntary/Patient has signed consent for treatment    Psychiatry Consult: no       Updated   regarding plan of care.           Goldie Malagon, SIOMARAC, LADC

## 2024-02-01 NOTE — ED PROVIDER NOTES
EmPATH Unit - follow-up observation Note  [facility] Emergency Department  Observation Initiation Date: Jan 31, 2024    Misty Parham MRN: [id number]   Age: 38 year old YOB: 1985     History     Chief Complaint   Patient presents with    Suicidal     HPI  Misty Parham is a 38 year old female with a past history notable for major depressive disorder who is currently under observation status on the EmPATH unit, now approaching 25 hours in the emergency department.  Overnight, there were no acute issues.  On reassessment today, the patient was noted to be resting in her recliner.  She accompanied me to the interview room for our meeting.  She explains that she continues to feel quite depressed, characterizing a score of 9 on a scale of 0-10 with 10 being most severe.  In the context of this intensity, she is requesting to discharge home so that she may proceed with ending her life by overdosing on medications.  She expressed a sense of helplessness and hopelessness.  She does not wish to engage in a discussion on treatment planning or additional interventions that can help alleviate her depressive symptoms.  She simply does not wish to put forth the effort or endure the time it takes to respond to psychiatric interventions.  She was able to convey that symptoms were more bearable when she was taking the combination of Zyprexa and Effexor.  Unfortunately, she began to gain weight from Zyprexa and discontinued the medication.  Her mood symptoms significantly worsened a short time later leading to her current decompensated state.  She sat quietly as I discussed various options which could help mitigate weight gain associated with medications.  I reviewed with her various alternative medications that could replace Zyprexa and offer the same benefit.  I reviewed with her other interventions outside of medications alone that could help treat her underlying symptoms.  At the end of our conversation, she  "continues to convey a sense of helplessness and a lack of interest in engaging in treatments that could help alleviate her symptoms, continuing to desire suicide.  She explains that if we keep her in the hospital, she will refuse to eat or drink in order to proceed with suicide.  There is no indication of psychosis or homicidal thoughts.    Past Medical History  Past Medical History:   Diagnosis Date    Depressive disorder     Generalized anxiety disorder 5/15/2020     Past Surgical History:   Procedure Laterality Date    CHOLECYSTECTOMY       ALPRAZolam (XANAX) 1 MG tablet  docusate sodium (COLACE) 100 MG capsule  melatonin 3 MG tablet  OLANZapine (ZYPREXA) 10 MG tablet  polyethylene glycol (MIRALAX) 17 GM/Dose powder  venlafaxine (EFFEXOR XR) 150 MG 24 hr capsule  Vitamin D, Cholecalciferol, 25 MCG (1000 UT) CAPS  zolpidem ER (AMBIEN CR) 12.5 MG CR tablet  ARIPiprazole (ABILIFY) 10 MG tablet  clonazePAM (KLONOPIN) 1 MG tablet  hydrOXYzine (ATARAX) 50 MG tablet  lamoTRIgine (LAMICTAL) 25 MG tablet  OLANZapine (ZYPREXA) 20 MG tablet  OLANZapine (ZYPREXA) 5 MG tablet  topiramate (TOPAMAX) 100 MG tablet      No Known Allergies  Family History  Family History   Problem Relation Age of Onset    Diabetes Father      Social History   Social History     Tobacco Use    Smoking status: Never    Smokeless tobacco: Never   Vaping Use    Vaping Use: Never used   Substance Use Topics    Alcohol use: Not Currently    Drug use: Never          Review of Systems  A medically appropriate review of systems was performed with pertinent positives and negatives noted in the HPI, and all other systems negative.    Physical Examination   BP: 120/83  Pulse: 108  Temp: 98.3  F (36.8  C)  Resp: 16  Height: 165.1 cm (5' 5\")  Weight: 140.6 kg (310 lb)  SpO2: 94 %    Physical Exam  General: Appears stated age.   Neuro: Alert and fully oriented. Extremities appear to demonstrate normal strength on visual inspection.   Integumentary/Skin: no rash " visualized, normal color    Psychiatric Examination   Appearance: awake, alert  Attitude:  slightly uncooperative  Eye Contact:  fair  Mood:  depressed  Affect:  intensity is flat  Speech:  decreased prosody  Psychomotor Behavior:  physical retardation  Thought Process:  linear  Associations:  no loose associations  Thought Content:  no evidence of psychotic thought and active suicidal ideation present  Insight:  fair  Judgement:  limited  Oriented to:  time, person, and place  Attention Span and Concentration:  limited  Recent and Remote Memory:  fair  Language: able to name/identify objects without impairment  Fund of Knowledge: intact with awareness of current and past events    ED Course     ED Course as of 02/01/24 1147   Wed Jan 31, 2024   1210 I obtained history and examined the patient, as noted above.       Labs Ordered and Resulted from Time of ED Arrival to Time of ED Departure - No data to display    Assessments & Plan (with Medical Decision Making)   Patient presenting with severe depressed mood which appears to have worsened after she discontinued taking Zyprexa due to experiencing weight gain while taking the medication.  A short time afterwards, her mood decompensated and suicidal thoughts intensified leading to her current depressive episode.  She has a profound sense of hopelessness and is expressing a plan to end her life and unwillingness to participate in interventions aimed at treating the underlying cause of her suicidal ideation and distress.  Her treatment plan today is focused on pursuing involuntary commitment to support treatment of her mental illness. Nursing notes reviewed noting no acute issues.     I have reviewed the assessment completed by the [facility].     During the observation period, the patient did not require medications for agitation, and did not require restraints/seclusion for patient and/or provider safety.     The patient was found to have a psychiatric condition that would  benefit from an observation stay in the emergency department for further psychiatric stabilization and/or coordination of a safe disposition. The observation plan includes serial assessments of psychiatric condition, potential administration of medications if indicated, further disposition pending the patient's psychiatric course during the monitoring period.     Preliminary diagnosis:    ICD-10-CM    1. Borderline personality disorder (H)  F60.3       2. Severe episode of recurrent major depressive disorder, without psychotic features (H)  F33.2       3. Suicidal ideation  R45.851            Treatment Plan:  -Increase Effexor from 150 mg to 225 mg daily to optimize antidepressant interventions.  -Continue Zyprexa 10 mg nightly for augmentation of Effexor while noting that symptom intensity appears to have occurred after she discontinued this medication due to weight gain.  I discussed alternative medications however she is not interested at this time.  -Add metformin 500 mg twice a day to help minimize metabolic side effects related to Zyprexa  -For the reasons identified in the medical decision making portion of this note, the patient will be placed under a 72-hour hold and we will proceed with a petition for involuntary commitment to support treatment of her underlying mental illness.  We will proceed with admission to inpatient psychiatry for further stabilization.  The patient was made aware of this plan.    --  Baron Laughlin MD   [facility] EMERGENCY DEPT  EmPATH Unit       Baron Laughlin MD  02/01/24 1539

## 2024-02-01 NOTE — PROGRESS NOTES
"Patient is upset, she is mad that she was forced to come here because the Police showed up at her place. She stated ' it is my right to die, if I want to, no one should be involved\" she is tired of people messing up with her life, her plan is \" to starve to dead because I am refusing to eat or drink\"  We talked about finding things that will bring her ziyad, she said that her sons are important to her but they are too busy with work and school.  She will try to find time to reach her sons and make life more enjoyable.  "

## 2024-02-01 NOTE — PROGRESS NOTES
[facility] COURT- PROBATE/MENTAL HEALTH DIVISION  [facility] ([place])    _________________________________________________________________________  PETITION FOR JUDICIAL COMMITMENT    In the Matter of the Civil Commitment of:   Misty Parham  1985    D.C. File No. [id number]   C.A. File No. _____________     Baron Laughlin MD of [facility] is interested in the respondent as a Licensed Mental Health Professional, and to the best of his/her knowledge, information, and belief, petitioner respectfully represents:     1. Respondent was born on 1985    2. Respondent lives at [address] [place], [place] [id number]  and has residence in [place], [place] for the purpose of judicial commitment;     3. Respondent s spouse and nearest jie are:       4. The observations of respondent s behavior showing that respondent is Mentally Ill  and there is no suitable alternative to involuntary commitment are set forth in Exhibit A and the examiner s statement attached.     5. An examiner s statement supporting respondent s commitment is attached.     WHEREFORE, Petitioner prays the Court commit the respondent to the Commissioner of Human Services, or other treatment facility according to law.     I declare under penalty of perjury under the laws of the [facility] that the foregoing is true and correct.     Executed on February 1, 2024 in the [facility]  in the [facility] [place].         Signature: ________________________________           Examiner s name: Baron Laughlin MD          [facility] EMERGENCY DEPT  [address] [place] [id number]  [phone]  [phone]

## 2024-02-01 NOTE — CARE PLAN
Triage & Transition Services, Extended Care     Client Name: Misty Parham    Date: February 1, 2024    Patient was seen    Mode of Assessment:      Service Type: (P) other (see comments) (pt was resting)  Session Start Time:  (P) 1040    Session End Time: (P) 1050  Session Length: (P) 10  Site Location: [facility] EMERGENCY DEPT                             [id number]  Total Number ofAttendees: (P) 0  Topic:   (P) coping skills/lifestyle management   Response: (P) other (see comments) (pt was resting)     HAIM Dodson   Licensed Mental Health Professional (LMHP), Extended Care  [phone]

## 2024-02-01 NOTE — PROGRESS NOTES
[facility] COURT- PROBATE/MENTAL HEALTH DIVISION  [facility] ([place])    _________________________________________________________________________________________________________________________________________________________     In the Matter of the Civil Commitment of: Misty Parham      EXAMINERS STATEMENT IN SUPPORT OF       PETITION FOR JUDICIAL COMMITMENT,       Respondent File No._________________________     I am a licensed physician, licensed psychologist, licensed mental health professional, licensed physician assistant or advanced practice registered nurse certified in mental health who is knowledgeable, trained and practicing in the diagnosis or assessment or in the treatment of the alleged impairment listed below:        X Mentally Ill       I have examined the above-named person within the last fifteen days, on  February 1, 2024 and the results of the examination are stated below:        Behavioral evidence to support commitment:      The patient is severely depressed and requesting to discharge home so that she may proceed with committing suicide.  She does not wish to engage in treatments that may help lessen symptoms of her depressive disorder as she feels helpless and hopeless for recovery.  She is verbalizing a plan to overdose on medications in order to commit suicide.  She explains that if she remains in the hospital, she will starve herself in order to proceed with suicide.    Diagnostic Impression and Conclusion:      Major depressive disorder, recurrent, severe, without psychosis    Recommendations:      1.psychiatric hospitalization to assist in utilizing treatments that will help lessen the severity of her depressive symptoms.  2.comply with taking antidepressant medications and consider any other recommended procedure intended on treating her depressive disorder.  3.comply with outpatient follow-up mental health appointments for medication  management and psychotherapy    _________________________________________________________________________  Will this person need neuroleptic medications?  Yes       Does this person have sufficient awareness of their situation and understanding of treatment with neuroleptics to make this decision for themselves? Yes    **If you answered No, then you must provide either a [facility] petition or a Request for a Substitute Decision Maker form.      I am of the opinion that the above-named person is in need of treatment and should be committed to a treatment facility for Mentally Ill     Patient is currently Accepting Neuroleptic Medications.    (If mental illness is diagnosed) treatment with neuroleptic medication is:  Recommended    The above-named person does not have capacity to make decisions regarding such treatment.    Reasons for this opinion:    The patient is severely depressed and is requesting to discharge home so that she may proceed with committing suicide.     Dated:   February 1, 2024        Signature: ________________________________           Examiner s name: Baron Laughlin MD     [facility] EMERGENCY DEPT  [address] [place] [id number]  [phone]  [phone]

## 2024-02-01 NOTE — CONSULTS
"Diagnostic Evaluation Consultation  Crisis Assessment    Patient Name: Misty Parham  Age:  38 year old  Legal Sex: female  Gender Identity: female  Pronouns:   Race: White  Ethnicity: Choose not to answer  Language: English      Patient was assessed: Virtual: Sleep Number Crisis Assessment Start Time: 1634 Crisis Assessment Stop Time: 1704  Patient location: [facility] EMERGENCY DEPT                             [id number]    Referral Data and Chief Complaint  Misty Parham presents to the ED via EMS. Patient is presenting to the ED for the following concerns: Suicidal ideation.   Factors that make the mental health crisis life threatening or complex are:  Pt presents after texting her psychiatrist, \"[facility]\" Pt reports that she was curious what his opinion would be. Pt reports she is frustrated she is in the ED, \"I don't know why he would call 911, it's dumb. I don't need to be here.\" Pt reports she has been having hopelessness and thoughts of suicide via overdose on Trazadone, a prescription she has. Pt reports she stopped Olazepine 3 months ago due to struggling to lose weight. Pt reports she has an appointment with her psychiatrist tomorrow, 2/1/2024. Pt reports her mother is upset she is in the ED, and states, \"this is why I don't tel lher when I am struggling. I don't tell anyone, I just deal with it myself.\" Pt reports that she wants to go home. Pt presents as minimally responsive, flat, apathetic, and struggled to engage in safety planning. Pt was unable to identify supports she can talk to when struggling, reports it's hard for her to ask for help, lacks consistent skills she can use, and is unsure about her parents managing her medications; to restrict her access to means to harm herself..      Informed Consent and Assessment Methods  Explained the crisis assessment process, including applicable information disclosures and limits to confidentiality, assessed " understanding of the process, and obtained consent to proceed with the assessment.  Assessment methods included conducting a formal interview with patient, review of medical records, collaboration with medical staff, and obtaining relevant collateral information from family and community providers when available.  : done     Patient response to interventions: acceptance expressed, verbalizes understanding  Coping skills were attempted to reduce the crisis:  none     History of the Crisis   Pt reports history of borderline personality disorder, anxiety, depression, and auditory hallucinations. Pt reports she has been to the ED and engage in psychiatric inpatient services. Pt reports that she has never attempted suicide, but has had plans to overdose before. Pt reports that suicide ideation is chronic for her, and she wavers on intent to kill herself. Pt denies history of self-harm.    Brief Psychosocial History  Family:  , Children no  Support System:  Sibling(s), Parent(s)  Employment Status:  disabled  Source of Income:  disability, social security  Financial Environmental Concerns:  none  Current Hobbies:  television/movies/videos, music  Barriers in Personal Life:  mental health concerns    Significant Clinical History  Current Anxiety Symptoms:     Current Depression/Trauma:  apathy, negativistic, withdrawl/isolation, difficulty concentrating, thoughts of death/suicide, impaired decision making, helplessness, hopelessness  Current Somatic Symptoms:     Current Psychosis/Thought Disturbance:     Current Eating Symptoms:     Chemical Use History:  Alcohol: None  Benzodiazepines: None  Opiates: None  Cocaine: None  Marijuana: None  Other Use: None   Past diagnosis:  Depression, Suicide attempt(s)  Family history:  Substance Use Disorder  Past treatment:  Inpatient Hospitalization, Psychiatric Medication Management, Day Treatment  Details of most recent treatment:  Pt reports current involvement in  "medication management. Pt reports she recently finished a DBT program. Pt reports she is in need of a therapist.  Other relevant history:  Pt reports that she lives with her parents. Pt reprots she doesn't share with her parents much, \"I don't like tn't like telling htem anything.\" Pt reports that she has no history of legal issues. Pt reports she is on disability and doesn't work. Pt endorses a history of trauma.       Collateral Information  Is there collateral information: No (Writer attempted to call pt motherJENNIFER ([phone]). Voicemail left.)     Collateral information name, relationship, phone number:       What happened today:       What is different about patient's functioning:       Concern about alcohol/drug use:      What do you think the patient needs:      Has patient made comments about wanting to kill themselves/others:      If d/c is recommended, can they take part in safety/aftercare planning:       Additional collateral information:        Risk Assessment  [facility] Suicide Severity Rating Scale Full Clinical Version:  Suicidal Ideation  Q1 Wish to be Dead (Lifetime): Yes  Q2 Non-Specific Active Suicidal Thoughts (Lifetime): Yes  3. Active Suicidal Ideation with any Methods (Not Plan) Without Intent to Act (Lifetime): Yes  Q4 Active Suicidal Ideation with Some Intent to Act, Without Specific Plan (Lifetime): Yes  Q5 Active Suicidal Ideation with Specific Plan and Intent (Lifetime): Yes  Q6 Suicide Behavior (Lifetime): yes     Suicidal Behavior (Lifetime)  Actual Attempt (Lifetime): No  Has subject engaged in non-suicidal self-injurious behavior? (Lifetime): No  Interrupted Attempts (Lifetime): No  Aborted or Self-Interrupted Attempt (Lifetime): No  Preparatory Acts or Behavior (Lifetime): Yes  Total Number of Preparatory Acts (Lifetime): 1  Preparatory Acts or Behavior Description (Lifetime): bought a bottle of Tylenol PM a year ago when planning to overdose.    [facility] Suicide Severity Rating " Scale Recent:   Suicidal Ideation (Recent)  Q1 Wished to be Dead (Past Month): yes  Q2 Suicidal Thoughts (Past Month): yes  Q3 Suicidal Thought Method: yes  Q4 Suicidal Intent without Specific Plan: yes  Q5 Suicide Intent with Specific Plan: yes  Level of Risk per Screen: high risk  Intensity of Ideation (Recent)  Most Severe Ideation Rating (Past 1 Month): 5  Frequency (Past 1 Month): Many times each day  Duration (Past 1 Month): More than 8 hours/persistent or continuous  Controllability (Past 1 Month): Can control thoughts with a lot of difficulty  Deterrents (Past 1 Month): Uncertain that deterrents stopped you  Reasons for Ideation (Past 1 Month): Equally to get attention, revenge, or a reaction from others and to end/stop the pain  Suicidal Behavior (Recent)  Actual Attempt (Past 3 Months): No  Has subject engaged in non-suicidal self-injurious behavior? (Past 3 Months): No  Interrupted Attempts (Past 3 Months): No  Aborted or Self-Interrupted Attempt (Past 3 Months): No  Preparatory Acts or Behavior (Past 3 Months): No    Environmental or Psychosocial Events: other life stressors, challenging interpersonal relationships, helplessness/hopelessness, loss of a relationship due to divorce/separation  Protective Factors: Protective Factors: able to access care without barriers, lives in a responsibly safe and stable environment, responsibilities and duties to others, including pets and children, supportive ongoing medical and mental health care relationships    Does the patient have thoughts of harming others? Feels Like Hurting Others: no  Previous Attempt to Hurt Others: no  Is the patient engaging in sexually inappropriate behavior?: no    Is the patient engaging in sexually inappropriate behavior?  no        Mental Status Exam   Affect: Flat  Appearance: Appropriate  Attention Span/Concentration: Attentive  Eye Contact: Engaged    Fund of Knowledge: Appropriate   Language /Speech Content: Fluent  Language  /Speech Volume: Normal  Language /Speech Rate/Productions: Minimally Responsive  Recent Memory: Intact  Remote Memory: Intact  Mood: Apathetic, Depressed  Orientation to Person: Yes   Orientation to Place: Yes  Orientation to Time of Day: Yes  Orientation to Date: Yes     Situation (Do they understand why they are here?): Yes  Psychomotor Behavior: Normal  Thought Content: Suicidal  Thought Form: Intact     Mini-Cog Assessment  Number of Words Recalled:    Clock-Drawing Test:     Three Item Recall:    Mini-Cog Total Score:       Medication  Psychotropic medications:   Medication Orders - Psychiatric (From admission, onward)      Start     Dose/Rate Route Frequency Ordered Stop    01/31/24 1627  ALPRAZolam (XANAX) tablet 1 mg         1 mg Oral 3 TIMES DAILY PRN 01/31/24 1627               Current Care Team  Patient Care Team:  Jelena Mackey MD as PCP - General (Internal Medicine)  Jose Juan Aquino [facility] as Pharmacist (Pharmacist)  Jelena Mackey MD as Assigned PCP    Diagnosis  Patient Active Problem List   Diagnosis Code    Depression F32.A    Suicidal ideation R45.851    Depression with suicidal ideation F32.A, R45.851    Major depression F32.9    Suicidal behavior R45.89    Severe episode of recurrent major depressive disorder, without psychotic features (H) F33.2    Borderline personality disorder (H) F60.3    Facial cellulitis L03.211    Auditory hallucination R44.0    Psychosis, unspecified psychosis type (H) F29    Morbid obesity (H) E66.01    Chronic insomnia F51.04    Depression, major, recurrent, in partial remission (H24) F33.41    Generalized anxiety disorder F41.1       Primary Problem This Admission  Active Hospital Problems    *Borderline personality disorder (H)        Clinical Summary and Substantiation of Recommendations   A lower level of care has been unsuccessful in treating and stabilizing patient s mental health symptoms because of Pt was unable to identify supports she can talk to  when struggling, reports it's hard for her to ask for help, lacks consistent skills she can use, and is unsure about her parents managing her medications; to restrict her access to means to harm herself. Pt has suicide ideation with plans, means, and inconistent intent to harm herself. However, with brief observation, monitored therapeutic treatment, and intervention of mental health symptoms in the [facility], symptoms may be mitigated with potential for disposition to a less restrictive level of care than an inpatient setting. Patient is not currently on the inpatient worklist. Next steps in care include pt will remain in ED overnight until collateral can be reached and pt can better engage in safety planning. Extended Care will follow, working to address coordination with pt family for safety planning, pt ability to engage in safety planning, and assist in scheduling an appointment for outpatient therapy, possibly referral to DBT program.                          Patient coping skills attempted to reduce the crisis:  none    Disposition  Recommended disposition: Observation        Reviewed case and recommendations with attending provider. Attending Name: Deborah Smith CNP       Attending concurs with disposition: yes       Patient and/or validated legal guardian concurs with disposition:   yes       Final disposition:  observation    Legal status on admission: Voluntary/Patient has signed consent for treatment    Assessment Details   Total duration spent with the patient: 30 min     CPT code(s) utilized: 77861 - Psychotherapy for Crisis - 60 (30-74*) min    DANILO Salazar, TOOC, Psychotherapist  [facility] - Triage & Transition Services  Callback: [phone]

## 2024-02-02 ENCOUNTER — TELEPHONE (OUTPATIENT)
Dept: BEHAVIORAL HEALTH | Facility: CLINIC | Age: 39
End: 2024-02-02
Payer: MEDICARE

## 2024-02-02 LAB
ALBUMIN UR-MCNC: NEGATIVE MG/DL
AMPHETAMINES UR QL SCN: NORMAL
APPEARANCE UR: ABNORMAL
BARBITURATES UR QL SCN: NORMAL
BENZODIAZ UR QL SCN: NORMAL
BILIRUB UR QL STRIP: NEGATIVE
BZE UR QL SCN: NORMAL
CANNABINOIDS UR QL SCN: NORMAL
COLOR UR AUTO: ABNORMAL
FENTANYL UR QL: NORMAL
GLUCOSE UR STRIP-MCNC: NEGATIVE MG/DL
HCG UR QL: NEGATIVE
HGB UR QL STRIP: NEGATIVE
KETONES UR STRIP-MCNC: ABNORMAL MG/DL
LEUKOCYTE ESTERASE UR QL STRIP: NEGATIVE
MUCOUS THREADS #/AREA URNS LPF: PRESENT /LPF
NITRATE UR QL: NEGATIVE
OPIATES UR QL SCN: NORMAL
PCP QUAL URINE (ROCHE): NORMAL
PH UR STRIP: 5 [PH] (ref 5–7)
RBC URINE: <1 /HPF
SARS-COV-2 RNA RESP QL NAA+PROBE: POSITIVE
SP GR UR STRIP: 1.01 (ref 1–1.03)
SQUAMOUS EPITHELIAL: 10 /HPF
UROBILINOGEN UR STRIP-MCNC: NORMAL MG/DL
WBC URINE: 2 /HPF

## 2024-02-02 PROCEDURE — 81025 URINE PREGNANCY TEST: CPT | Performed by: PSYCHIATRY & NEUROLOGY

## 2024-02-02 PROCEDURE — G0378 HOSPITAL OBSERVATION PER HR: HCPCS

## 2024-02-02 PROCEDURE — 250N000013 HC RX MED GY IP 250 OP 250 PS 637

## 2024-02-02 PROCEDURE — 87635 SARS-COV-2 COVID-19 AMP PRB: CPT | Performed by: PSYCHIATRY & NEUROLOGY

## 2024-02-02 PROCEDURE — 99232 SBSQ HOSP IP/OBS MODERATE 35: CPT | Performed by: NURSE PRACTITIONER

## 2024-02-02 PROCEDURE — 250N000013 HC RX MED GY IP 250 OP 250 PS 637: Performed by: PSYCHIATRY & NEUROLOGY

## 2024-02-02 PROCEDURE — 81001 URINALYSIS AUTO W/SCOPE: CPT | Performed by: PSYCHIATRY & NEUROLOGY

## 2024-02-02 PROCEDURE — 80307 DRUG TEST PRSMV CHEM ANLYZR: CPT | Performed by: PSYCHIATRY & NEUROLOGY

## 2024-02-02 RX ORDER — ZOLPIDEM TARTRATE 10 MG/1
10 TABLET ORAL AT BEDTIME
Status: ON HOLD | COMMUNITY
End: 2024-02-28

## 2024-02-02 RX ORDER — ACETAMINOPHEN 325 MG/1
650 TABLET ORAL EVERY 6 HOURS PRN
Status: DISCONTINUED | OUTPATIENT
Start: 2024-02-02 | End: 2024-02-03

## 2024-02-02 RX ORDER — TRAZODONE HYDROCHLORIDE 50 MG/1
50 TABLET, FILM COATED ORAL
Status: ON HOLD | COMMUNITY
End: 2024-02-28

## 2024-02-02 RX ORDER — OLANZAPINE 20 MG/1
10 TABLET ORAL 2 TIMES DAILY
Status: ON HOLD | COMMUNITY
End: 2024-02-28

## 2024-02-02 RX ORDER — BISACODYL 5 MG
5 TABLET, DELAYED RELEASE (ENTERIC COATED) ORAL DAILY PRN
Status: DISCONTINUED | OUTPATIENT
Start: 2024-02-02 | End: 2024-02-15 | Stop reason: HOSPADM

## 2024-02-02 RX ADMIN — VENLAFAXINE HYDROCHLORIDE 225 MG: 150 CAPSULE, EXTENDED RELEASE ORAL at 08:43

## 2024-02-02 RX ADMIN — METFORMIN HYDROCHLORIDE 500 MG: 500 TABLET ORAL at 17:57

## 2024-02-02 RX ADMIN — DOCUSATE SODIUM 100 MG: 100 CAPSULE, LIQUID FILLED ORAL at 12:07

## 2024-02-02 RX ADMIN — OLANZAPINE 10 MG: 5 TABLET, FILM COATED ORAL at 21:17

## 2024-02-02 RX ADMIN — ACETAMINOPHEN 650 MG: 325 TABLET, FILM COATED ORAL at 17:28

## 2024-02-02 RX ADMIN — METFORMIN HYDROCHLORIDE 500 MG: 500 TABLET ORAL at 08:44

## 2024-02-02 ASSESSMENT — ACTIVITIES OF DAILY LIVING (ADL)
ADLS_ACUITY_SCORE: 37

## 2024-02-02 NOTE — ED PROVIDER NOTES
EmPATH Unit - Psychiatric Consultation  [facility] Emergency Department    Misty Parham MRN: [id number]   Age: 38 year old YOB: 1985     History     Chief Complaint   Patient presents with    Suicidal     HPI  Misty Parham is a 38 year old female with history notable for for major depressive disorder who is currently under observation status on the EmPATH unit, now approaching 53 hours in the emergency department.  She is currently on a 72 hour hold, pending inpatient transfer with petition for involuntary commitment.    Please see the [facility] assessment & reassessment (if available), ED physician notes and nursing notes for additional information and collateral content if available. All were reviewed prior to meeting with the patient. Pertinent content includes the following: Overnight, there were no acute issues.  Venlafaxine was increased from 150 mg to 225 mg yesterday, metformin 500 mg twice daily was added, and she remained on Zyprexa 10 mg at bedtime. Today, her COVID test returned as positive. She is asymptomatic. She is currently wearing a mask awaiting transfer back to the ED for isolation.    On reassessment today, the patient was noted to be resting in her recliner. She was interviewed at chairside. She reports feeling a little better and noticed no side effects from the medication changes. She reports ongoing suicide ideation, without specific intention or plan while here. She reports not sleeping well, which she attributes in part due to attempting to sleep in a recliner. She agrees to retreat to a sensory room pending availability of an isolation bed in the ED. There is no indication of psychosis or homicidal thoughts.       Past Medical History  Past Medical History:   Diagnosis Date    Depressive disorder     Generalized anxiety disorder 5/15/2020     Past Surgical History:   Procedure Laterality Date    CHOLECYSTECTOMY       ALPRAZolam (XANAX) 1 MG tablet  docusate sodium (COLACE) 100  "MG capsule  melatonin 3 MG tablet  OLANZapine (ZYPREXA) 10 MG tablet  polyethylene glycol (MIRALAX) 17 GM/Dose powder  venlafaxine (EFFEXOR XR) 150 MG 24 hr capsule  Vitamin D, Cholecalciferol, 25 MCG (1000 UT) CAPS  zolpidem ER (AMBIEN CR) 12.5 MG CR tablet  ARIPiprazole (ABILIFY) 10 MG tablet  clonazePAM (KLONOPIN) 1 MG tablet  hydrOXYzine (ATARAX) 50 MG tablet  lamoTRIgine (LAMICTAL) 25 MG tablet  OLANZapine (ZYPREXA) 20 MG tablet  OLANZapine (ZYPREXA) 5 MG tablet  topiramate (TOPAMAX) 100 MG tablet      No Known Allergies  Family History  Family History   Problem Relation Age of Onset    Diabetes Father      Social History   Social History     Tobacco Use    Smoking status: Never    Smokeless tobacco: Never   Vaping Use    Vaping Use: Never used   Substance Use Topics    Alcohol use: Not Currently    Drug use: Never          Review of Systems  A medically appropriate review of systems was performed with pertinent positives and negatives noted in the HPI, and all other systems negative.    Physical Examination   BP: 120/83  Pulse: 108  Temp: 98.3  F (36.8  C)  Resp: 16  Height: 165.1 cm (5' 5\")  Weight: 140.6 kg (310 lb)  SpO2: 94 %    Physical Exam  General: Appears stated age.   Neuro: Alert and fully oriented. Extremities appear to demonstrate normal strength on visual inspection.   Integumentary/Skin: no rash visualized, normal color    Psychiatric Examination   Appearance: awake, alert  Attitude:  cooperative  Eye Contact:  fair  Mood:  depressed  Affect:  mood congruent and intensity is blunted  Speech:  clear, coherent and paucity of speech  Psychomotor Behavior:  no evidence of tardive dyskinesia, dystonia, or tics  Thought Process:  linear  Associations:  no loose associations  Thought Content:  no evidence of psychotic thought and active suicidal ideation present  Insight:  fair  Judgement:  fair  Oriented to:  time, person, and place  Attention Span and Concentration:  fair  Recent and Remote Memory:  " fair  Language: able to name/identify objects without impairment  Fund of Knowledge: intact with awareness of current and past events    ED Course     ED Course as of 02/02/24 1638   Wed Jan 31, 2024   1210 I obtained history and examined the patient, as noted above.       Labs Ordered and Resulted from Time of ED Arrival to Time of ED Departure   COVID-19 VIRUS (CORONAVIRUS) BY PCR - Abnormal       Result Value    SARS CoV2 PCR Positive (*)    HCG QUALITATIVE URINE   UA MACROSCOPIC WITH REFLEX TO MICRO AND CULTURE       Assessments & Plan (with Medical Decision Making)   Patient presenting with severe depressed mood which appears to have worsened after she discontinued taking Zyprexa due to experiencing weight gain while taking the medication.  A short time afterwards, her mood decompensated and suicidal thoughts intensified leading to her current depressive episode.  She expressed to Dr. Laughlin yesterday a profound sense of hopelessness and is expressing a plan to end her life and unwillingness to participate in interventions aimed at treating the underlying cause of her suicidal ideation and distress. She denies intention or plan while in EmPATH today and reports feeling somewhat better. She tested + for COVID today and will be returning to the ED for isolation. Her plan of care is to continue the treatment plan as outlined below over the weekend and consult Psychiatry on Monday if she is still in the ED. Her treatment plan today is focused on pursuing involuntary commitment to support treatment of her mental illness.Nursing notes reviewed noting no acute issues.     I have reviewed the assessment completed by the [facility].     Preliminary diagnosis:    ICD-10-CM    1. Borderline personality disorder (H)  F60.3       2. Severe episode of recurrent major depressive disorder, without psychotic features (H)  F33.2       3. Suicidal ideation  R45.851            Treatment Plan:  - Continue Effexor 225 mg daily to optimize  antidepressant interventions.  - Continue Zyprexa 10 mg nightly for augmentation of Effexor while noting that symptom intensity appears to have occurred after she discontinued this medication due to weight gain. Alternative medications were discussed with the patient by Dr. Laughlin and she was not interested in trying anything else.  - Continue 500 mg of metformin twice daily to help minimize the metabolic side effects of Zyprexa.  - Maintain 72 hour hold and transfer to an inpatient bed when available and when cleared medically.  - Return to the ED for isolation when a bed becomes available, will sequester patient in a sensory room for now awaiting an empty ED bed.  - Recommend maintaining current recommendations over the weekend and consulting Psychiatry on Monday for an updated assessment.    --  WILLIAN Henry CNP   [facility] EMERGENCY DEPT  EmPATH Unit      Dara Jain APRN CNP  02/02/24 0572

## 2024-02-02 NOTE — PROGRESS NOTES
"Pt spent most of this shift resting in her recliner. She refused to eat her dinner and she refuses to drink water. This writer encouraged pt to eat some food and drink some water but pt continued to refuse. Pt reports \"I will dehydrate myself to death\". Pt refused her scheduled metformin.   "

## 2024-02-02 NOTE — PROGRESS NOTES
Patient is feeling much better today, she reported she still want to go home to die, but today she was able to eat and drink.  She was not standing by the doors today, she reported constipation , dulcolax given.   Misty took a shower. Patient visited with [facility] Pre petition screener.

## 2024-02-02 NOTE — TELEPHONE ENCOUNTER
12:02 PM: Intake provided  at [facility] with MRN to review for possible placement.    1:13 PM:  from [facility] called to inform that Pt has been declined d/t acuity.    1:45 PM: Intake called [facility]  to check for bed availability. Marleny informed that [facility] can review.    1:49 PM: Intake called [facility] to follow-up on labs that are needing to be collected and to order Covid test. Clary to inform RN.    2:32 PM: Pt face sheet and notes sent via fax. Awaiting labs.    2:57 PM: Intake sent Covid results via fax.      R: [place]  Access Inpatient Bed Call Log 2/2/2024  @ 7:46 am:      Intake has called facilities that have not updated the bed status within the last 12 hours.       Adults:  [facility] is posting 0 beds.      [facility] is posting 0 beds. [phone] ECT tx offered    [facility] is posting 0 beds. Negative covid required  ECT Tx  offered            [facility] is posting 0 beds. Neg covid. No high school/Mitali-psych.  [phone];  per call at 7:46 am to [facility], they have 2 low acuity beds.    [facility] is posting 0 beds. [phone] ECT Tx offered;    [facility] is posting 0 beds.     [facility] is posting 2 beds. [phone]         [facility] is posting 3 beds.  LOW acuity ONLY. Mixed unit 12+.  Negative covid- [phone]    [facility] has 2 beds posted. No aggression. Negative Covid. Low  acuity  [phone] part of the [facility] system    [facility] is posting 0 beds. Negative covid. ECT Tx offered [phone];  says not to leave a message.    [facility] is posting 1 bed. Low acuity only. Negative covid.  [phone];  Mood d/o only.    [facility] is posting 0 beds. Low acuity. No current aggression.   [facility] ([place] is posting 0 beds. Negative covid.   ECT Tx offered @ [facility] ONLY   [phone].   [facility] ([facility]) is posting 6 beds. Low acuity only. Negative covid.   [phone];  per call at 7:53 am to Nissa, they have 5 beds avail.    [facility] is not on the board today. Low acuity. 72HH hold preferred. Negative covid required. [phone]; per call at 7:55 am to Barb, they are at cap.    [facility] is posting 6 beds. Negative  Covid required. Vol only,   No history of aggression, violence, or assault.   No sexual offenders. No 72 HH holds. [phone]       [facility] is posting 6 beds. Negative covid required.  (Must have the cognitive ability to do programming. No aggressive or  violent behavior or recent HX in the last 2 yrs. MH must be primary.)   Always       low acuity.  ECT Tx Offered [phone]   [facility] has 2 beds posted. Negative covid required.  Low acuity only. Violence and aggression capped.  [phone];     [facility] is posting 0 beds. Low acuity, Negative covid required. ECT Tx Offered [phone];  per call at 7:59 am to Ana, they are at cap.    [facility] is posting 1 bed. Unit is a combined unit (14+).  No aggressive patients. Voluntary only.   Must be accompanied by a guardian.  Negative covid. [phone];    [facility] is posting 3 beds Negative covid required.  [phone]- referral line   [facility] has 3 beds posted. Negative covid. LOW acuity. (No lines, drains, or tubes, oxygen, CPAP, IV, etc.) Must Have a Ride Home. [phone]; per call at 8:02 am to Jw, they have 2 beds avail.   [facility] is posting 6 beds. Negative covid. (No. lines, drains, or tubes, oxygen, CPAP, IV, etc.). [phone];  per call at 8:10 am to Azra, she does not know availability and said we can call back later       Pt remains on the work list pending appropriate bed availability.

## 2024-02-02 NOTE — PROGRESS NOTES
Infection Prevention Progress Note  2/2/2024      Patient Name: Misty Parham [id number]  Admit Date: 1/31/2024    Infection Status as of 2/2/2024 4:39 PM: COVID-19  Isolation Status as of 2/2/2024 4:39 PM: No active isolations       Patient tested positive for COVID 2/2/2024.  Asymptomatic at this time. No Covid symptoms prior to arrival.  Does require Special Precautions for 10 days, with today being day 0.        If you have any questions, please contact Infection Prevention.    Monse Grady, Infection Prevention

## 2024-02-02 NOTE — TELEPHONE ENCOUNTER
R: 3:40pm- PPS Writer saw that Pt's COVID came back positive.  PPS Writer called [facility] to cancel review.  Notified [facility] staff that due to a positive COVID, Pt would be removed from  inpt worklist.

## 2024-02-02 NOTE — PROGRESS NOTES
Triage & Transition Services, Extended Care     Client Name: Misty Parham    Date: February 2, 2024      Service Type: (P) other (see comments) (declined invite to attend group)  Session Start Time:  (P) 1030    Session End Time: (P) 1110  Session Length: (P) 40  Site Location: [facility] EMERGENCY DEPT                             EMP02  Total Number ofAttendees: 0       ÓSCAR Moscoso, [facility] Hospital[facility]  Licensed Mental Health Professional (LMHP)  EmPATH, [phone]

## 2024-02-02 NOTE — ED NOTES
IP MH Referral Acuity Rating Score (RARS)    LMHP complete at referral to IP MH, with DEC; and, daily while awaiting IP MH placement. Call score to PPS.  CRITERIA SCORING   New 72 HH and Involuntary for IP MH (not adolescent) 1/1   Boarding over 24 hours 1/1   Vulnerable adult at least 55+ with multiple co morbidities; or, Patient age 11 or under 0/1   Suicide ideation without relief of precipitating factors 1/1   Current plan for suicide 1/1   Current plan for homicide 0/1   Imminent risk or actual attempt to seriously harm another without relief of factors precipitating the attempt 0/1   Severe dysfunction in daily living (ex: complete neglect for self care, extreme disruption in vegetative function, extreme deterioration in social interactions) 1/1   Recent (last 2 weeks) or current physical aggression in the ED 0/1   Restraints or seclusion episode in ED 0/1   Verbal aggression, agitation, yelling, etc., while in the ED 0/1   Active psychosis with psychomotor agitation or catatonia 0/1   Need for constant or near constant redirection (from leaving, from others, etc).  0/1   Intrusive or disruptive behaviors 0/1   TOTAL Acuity Total Score: 4

## 2024-02-02 NOTE — ED NOTES
Patient reports she did not have COVID prior to admission to the hospital. She denies any needs at the moment. Pt is resting on recliner with a mask on. Waiting on a bed to open up to return patient to the ED for quarantine.

## 2024-02-02 NOTE — PROGRESS NOTES
"  Triage & Transition Services, Extended Care     Therapy Progress Note    Patient: Misty goes by \"Misty,\" uses she/her pronouns  Date of Service: February 2, 2024  Site of Service: [facility] EMERGENCY DEPT                             EMP02  Patient was seen yes  Mode of Assessment: In person    Presentation Summary: Pt presented with a flat and depressed affect. Pts mood was congruent with this presentation. Pt was oriented x4. Pt was engaged during assessment. Pt endorsed that she is still having SI w/plan to overdose on her medications and she has been trying to not eat or drink on the unit, but Pt stated that she had a banana and water today. Pt endorsed that her SI severity on a Likert scale of 1-10, was a 6/10. Pt endorsed wanting to go home to end her life. Pt did not endorse any SIB/HI or AH/VH. Pt has been somewhat engaged in the milieu and has been complaint with medications. Pt endorsed that her goal today was to take a shower and possibly participate in group.    Therapeutic Intervention(s) Provided: Engaged in guided discovery, explored patient's perspectives and helped expand them through socratic dialogue., Discussed and practiced mindfulness., Identified and practiced coping skills., Reviewed healthy living that supports positive mental health, including looking at sleep hygiene, regular movement, nutrition, and regular socialization.    Current Symptoms: anxious apathy, avoidance, helplessness, hoplessness, thoughts of death/suicide anxious   loss of appetite    Mental Status Exam   Affect: Flat  Appearance: Disheveled  Attention Span/Concentration: Attentive  Eye Contact: Variable    Fund of Knowledge: Appropriate   Language /Speech Content: Fluent  Language /Speech Volume: Soft  Language /Speech Rate/Productions: Minimally Responsive  Recent Memory: Intact  Remote Memory: Intact  Mood: Apathetic, Depressed  Orientation to Person: Yes   Orientation to Place: Yes  Orientation to " Time of Day: Yes  Orientation to Date: Yes     Situation (Do they understand why they are here?): Yes  Psychomotor Behavior: Normal  Thought Content: Suicidal  Thought Form: Intact, Goal Directed    Treatment Objective(s) Addressed: rapport building, processing feelings, identifying additional supports, identifying treatment goals    Patient Response to Interventions: needs reinforcement, unacceptance expressed    Progress Towards Goals: Patient Reports Symptoms Are: worsening  Patient Progress Toward Goals: is not making progress  Comment: Pt has been somewaht receptive to ED interventions.  Next Step to Work Toward Discharge: symptom stabilization, patient ability to engage in safety planning  Symptom Stabilization Comment: Pt presents with worsening depression and SI  Ability to Engage Comment: Pt was not able to safety plan with writer    Case Management: Case Management Included: collaborating with patient's support system  Details on Collaborating with Patient's Support System: Writer talked to Pts mother Mona. Pt also has a  Marleny Núñez # [phone]  Summary of Interaction: Mona endorsed that Pt has been living with her and Pt has not disclosed any SI but has a hx of not telling her everything. Pt has been depressed and not taking her medications.    Plan: inpatient mental health  yes provider, RN    no (72HH placed)    Clinical Substantiation: At this time IP MH admission is being recommended due to SI w/plan and intent. Pts current sx appear to be impacting her ability to safety and approprietly function in the community. Pt was not able to fully safety plan with writer. Pt does appear to be at higher risk of death by suicide accidental or intentional due to mental health hx and substance use sx. If Pt is able to effectively safety plan and/or Pts sx improve it would be beneficial to pursue a less restrictive alternative. Pt was placed on a 72 hour hold on 2/1/24 at 1147. Hospital staff have  petitioned for MI civil commitment. Hospital staff are awaiting court determination.    Legal Status: Legal Status at Admission: 72 Hour Hold  72 Hour Hold - Date/Time Initiated: 2/1/24 at 1147  72 Hour Hold - Date/Time Ends: 2/7/24 at 1147    Session Status: Time session started: 0930  Time session ended: 0950  Session Duration (minutes): 20 minutes  Session Number: 2  Anticipated number of sessions or this episode of care: 4    Time Spent: 20 minutes    CPT Code: CPT Codes: 97206 - Psychotherapy (with patient) - 30 (16-37*) min    Diagnosis:   Patient Active Problem List   Diagnosis Code    Depression F32.A    Suicidal ideation R45.851    Depression with suicidal ideation F32.A, R45.851    Major depression F32.9    Suicidal behavior R45.89    Severe episode of recurrent major depressive disorder, without psychotic features (H) F33.2    Borderline personality disorder (H) F60.3    Facial cellulitis L03.211    Auditory hallucination R44.0    Psychosis, unspecified psychosis type (H) F29    Morbid obesity (H) E66.01    Chronic insomnia F51.04    Depression, major, recurrent, in partial remission (H24) F33.41    Generalized anxiety disorder F41.1    Depression, unspecified depression type F32.A       Primary Problem This Admission: Active Hospital Problems    Depression, unspecified depression type      *Borderline personality disorder (H)        Toshia Chen, [facility]   Licensed Mental Health Professional (LMHP), [facility]  [phone]

## 2024-02-03 ENCOUNTER — APPOINTMENT (OUTPATIENT)
Dept: GENERAL RADIOLOGY | Facility: CLINIC | Age: 39
DRG: 178 | End: 2024-02-03
Attending: PHYSICIAN ASSISTANT
Payer: MEDICARE

## 2024-02-03 PROBLEM — R45.851 SUICIDAL IDEATION: Status: ACTIVE | Noted: 2018-03-22

## 2024-02-03 PROBLEM — F33.2 SEVERE EPISODE OF RECURRENT MAJOR DEPRESSIVE DISORDER, WITHOUT PSYCHOTIC FEATURES (H): Status: ACTIVE | Noted: 2018-01-15

## 2024-02-03 PROBLEM — U07.1 COVID-19: Status: ACTIVE | Noted: 2024-02-03

## 2024-02-03 LAB
ALBUMIN SERPL BCG-MCNC: 4.3 G/DL (ref 3.5–5.2)
ALP SERPL-CCNC: 85 U/L (ref 40–150)
ALT SERPL W P-5'-P-CCNC: 39 U/L (ref 0–50)
ANION GAP SERPL CALCULATED.3IONS-SCNC: 13 MMOL/L (ref 7–15)
AST SERPL W P-5'-P-CCNC: 43 U/L (ref 0–45)
BASOPHILS # BLD AUTO: 0.1 10E3/UL (ref 0–0.2)
BASOPHILS NFR BLD AUTO: 1 %
BILIRUB SERPL-MCNC: 0.3 MG/DL
BUN SERPL-MCNC: 10.2 MG/DL (ref 6–20)
CALCIUM SERPL-MCNC: 9.8 MG/DL (ref 8.6–10)
CHLORIDE SERPL-SCNC: 98 MMOL/L (ref 98–107)
CREAT SERPL-MCNC: 0.9 MG/DL (ref 0.51–0.95)
CRP SERPL-MCNC: 38.18 MG/L
D DIMER PPP FEU-MCNC: 0.44 UG/ML FEU (ref 0–0.5)
DEPRECATED HCO3 PLAS-SCNC: 27 MMOL/L (ref 22–29)
EGFRCR SERPLBLD CKD-EPI 2021: 82 ML/MIN/1.73M2
EOSINOPHIL # BLD AUTO: 0 10E3/UL (ref 0–0.7)
EOSINOPHIL NFR BLD AUTO: 0 %
ERYTHROCYTE [DISTWIDTH] IN BLOOD BY AUTOMATED COUNT: 14.7 % (ref 10–15)
GLUCOSE SERPL-MCNC: 89 MG/DL (ref 70–99)
HCT VFR BLD AUTO: 48.7 % (ref 35–47)
HGB BLD-MCNC: 15.3 G/DL (ref 11.7–15.7)
IMM GRANULOCYTES # BLD: 0 10E3/UL
IMM GRANULOCYTES NFR BLD: 0 %
LYMPHOCYTES # BLD AUTO: 2.3 10E3/UL (ref 0.8–5.3)
LYMPHOCYTES NFR BLD AUTO: 23 %
MCH RBC QN AUTO: 28.8 PG (ref 26.5–33)
MCHC RBC AUTO-ENTMCNC: 31.4 G/DL (ref 31.5–36.5)
MCV RBC AUTO: 92 FL (ref 78–100)
MONOCYTES # BLD AUTO: 1.3 10E3/UL (ref 0–1.3)
MONOCYTES NFR BLD AUTO: 13 %
NEUTROPHILS # BLD AUTO: 6.4 10E3/UL (ref 1.6–8.3)
NEUTROPHILS NFR BLD AUTO: 63 %
NRBC # BLD AUTO: 0 10E3/UL
NRBC BLD AUTO-RTO: 0 /100
PLATELET # BLD AUTO: 272 10E3/UL (ref 150–450)
POTASSIUM SERPL-SCNC: 3.8 MMOL/L (ref 3.4–5.3)
PROCALCITONIN SERPL IA-MCNC: 0.18 NG/ML
PROT SERPL-MCNC: 7.6 G/DL (ref 6.4–8.3)
RBC # BLD AUTO: 5.32 10E6/UL (ref 3.8–5.2)
SODIUM SERPL-SCNC: 138 MMOL/L (ref 135–145)
WBC # BLD AUTO: 10.1 10E3/UL (ref 4–11)

## 2024-02-03 PROCEDURE — G0378 HOSPITAL OBSERVATION PER HR: HCPCS

## 2024-02-03 PROCEDURE — 258N000003 HC RX IP 258 OP 636: Performed by: PHYSICIAN ASSISTANT

## 2024-02-03 PROCEDURE — 80053 COMPREHEN METABOLIC PANEL: CPT | Performed by: PHYSICIAN ASSISTANT

## 2024-02-03 PROCEDURE — 250N000013 HC RX MED GY IP 250 OP 250 PS 637

## 2024-02-03 PROCEDURE — 250N000013 HC RX MED GY IP 250 OP 250 PS 637: Performed by: EMERGENCY MEDICINE

## 2024-02-03 PROCEDURE — 250N000011 HC RX IP 250 OP 636: Performed by: PHYSICIAN ASSISTANT

## 2024-02-03 PROCEDURE — 85025 COMPLETE CBC W/AUTO DIFF WBC: CPT | Performed by: PHYSICIAN ASSISTANT

## 2024-02-03 PROCEDURE — 84145 PROCALCITONIN (PCT): CPT | Performed by: HOSPITALIST

## 2024-02-03 PROCEDURE — 250N000013 HC RX MED GY IP 250 OP 250 PS 637: Performed by: PSYCHIATRY & NEUROLOGY

## 2024-02-03 PROCEDURE — 999N000128 HC STATISTIC PERIPHERAL IV START W/O US GUIDANCE

## 2024-02-03 PROCEDURE — 120N000001 HC R&B MED SURG/OB

## 2024-02-03 PROCEDURE — 85379 FIBRIN DEGRADATION QUANT: CPT | Performed by: PHYSICIAN ASSISTANT

## 2024-02-03 PROCEDURE — 36415 COLL VENOUS BLD VENIPUNCTURE: CPT | Performed by: PHYSICIAN ASSISTANT

## 2024-02-03 PROCEDURE — 82550 ASSAY OF CK (CPK): CPT | Performed by: HOSPITALIST

## 2024-02-03 PROCEDURE — 71046 X-RAY EXAM CHEST 2 VIEWS: CPT

## 2024-02-03 PROCEDURE — 99222 1ST HOSP IP/OBS MODERATE 55: CPT | Mod: AI | Performed by: PHYSICIAN ASSISTANT

## 2024-02-03 PROCEDURE — 86140 C-REACTIVE PROTEIN: CPT | Performed by: PHYSICIAN ASSISTANT

## 2024-02-03 RX ORDER — ENOXAPARIN SODIUM 100 MG/ML
40 INJECTION SUBCUTANEOUS EVERY 12 HOURS
Status: DISCONTINUED | OUTPATIENT
Start: 2024-02-03 | End: 2024-02-14

## 2024-02-03 RX ORDER — BENZONATATE 100 MG/1
100 CAPSULE ORAL 3 TIMES DAILY PRN
Status: DISCONTINUED | OUTPATIENT
Start: 2024-02-03 | End: 2024-02-15 | Stop reason: HOSPADM

## 2024-02-03 RX ORDER — ACETAMINOPHEN 325 MG/1
650 TABLET ORAL EVERY 4 HOURS PRN
Status: DISCONTINUED | OUTPATIENT
Start: 2024-02-03 | End: 2024-02-15 | Stop reason: HOSPADM

## 2024-02-03 RX ORDER — ACETAMINOPHEN 650 MG/1
650 SUPPOSITORY RECTAL EVERY 4 HOURS PRN
Status: DISCONTINUED | OUTPATIENT
Start: 2024-02-03 | End: 2024-02-15 | Stop reason: HOSPADM

## 2024-02-03 RX ORDER — ACETAMINOPHEN 500 MG
1000 TABLET ORAL EVERY 6 HOURS PRN
Status: DISCONTINUED | OUTPATIENT
Start: 2024-02-03 | End: 2024-02-03

## 2024-02-03 RX ORDER — ONDANSETRON 2 MG/ML
4 INJECTION INTRAMUSCULAR; INTRAVENOUS EVERY 6 HOURS PRN
Status: DISCONTINUED | OUTPATIENT
Start: 2024-02-03 | End: 2024-02-15 | Stop reason: HOSPADM

## 2024-02-03 RX ORDER — AMOXICILLIN 250 MG
1 CAPSULE ORAL 2 TIMES DAILY PRN
Status: DISCONTINUED | OUTPATIENT
Start: 2024-02-03 | End: 2024-02-15 | Stop reason: HOSPADM

## 2024-02-03 RX ORDER — SODIUM CHLORIDE 9 MG/ML
INJECTION, SOLUTION INTRAVENOUS CONTINUOUS
Status: DISCONTINUED | OUTPATIENT
Start: 2024-02-03 | End: 2024-02-04

## 2024-02-03 RX ORDER — ONDANSETRON 4 MG/1
4 TABLET, ORALLY DISINTEGRATING ORAL EVERY 6 HOURS PRN
Status: DISCONTINUED | OUTPATIENT
Start: 2024-02-03 | End: 2024-02-15 | Stop reason: HOSPADM

## 2024-02-03 RX ORDER — GUAIFENESIN 600 MG/1
600 TABLET, EXTENDED RELEASE ORAL 2 TIMES DAILY PRN
Status: DISCONTINUED | OUTPATIENT
Start: 2024-02-03 | End: 2024-02-15 | Stop reason: HOSPADM

## 2024-02-03 RX ORDER — LIDOCAINE 40 MG/G
CREAM TOPICAL
Status: DISCONTINUED | OUTPATIENT
Start: 2024-02-03 | End: 2024-02-15 | Stop reason: HOSPADM

## 2024-02-03 RX ORDER — CALCIUM CARBONATE 500 MG/1
1000 TABLET, CHEWABLE ORAL 4 TIMES DAILY PRN
Status: DISCONTINUED | OUTPATIENT
Start: 2024-02-03 | End: 2024-02-15 | Stop reason: HOSPADM

## 2024-02-03 RX ORDER — AMOXICILLIN 250 MG
2 CAPSULE ORAL 2 TIMES DAILY PRN
Status: DISCONTINUED | OUTPATIENT
Start: 2024-02-03 | End: 2024-02-15 | Stop reason: HOSPADM

## 2024-02-03 RX ADMIN — METFORMIN HYDROCHLORIDE 500 MG: 500 TABLET ORAL at 17:28

## 2024-02-03 RX ADMIN — Medication 1 LOZENGE: at 17:29

## 2024-02-03 RX ADMIN — VENLAFAXINE HYDROCHLORIDE 225 MG: 150 CAPSULE, EXTENDED RELEASE ORAL at 09:31

## 2024-02-03 RX ADMIN — OLANZAPINE 10 MG: 5 TABLET, FILM COATED ORAL at 21:05

## 2024-02-03 RX ADMIN — ENOXAPARIN SODIUM 40 MG: 40 INJECTION SUBCUTANEOUS at 17:28

## 2024-02-03 RX ADMIN — ACETAMINOPHEN 650 MG: 325 TABLET, FILM COATED ORAL at 02:31

## 2024-02-03 RX ADMIN — SODIUM CHLORIDE: 9 INJECTION, SOLUTION INTRAVENOUS at 14:53

## 2024-02-03 RX ADMIN — ALPRAZOLAM 1 MG: 0.5 TABLET ORAL at 02:31

## 2024-02-03 RX ADMIN — Medication 1 LOZENGE: at 14:54

## 2024-02-03 RX ADMIN — ACETAMINOPHEN 1000 MG: 500 TABLET ORAL at 14:54

## 2024-02-03 RX ADMIN — SODIUM CHLORIDE: 9 INJECTION, SOLUTION INTRAVENOUS at 21:33

## 2024-02-03 RX ADMIN — Medication 1 LOZENGE: at 01:45

## 2024-02-03 RX ADMIN — METFORMIN HYDROCHLORIDE 500 MG: 500 TABLET ORAL at 09:31

## 2024-02-03 RX ADMIN — Medication 1 LOZENGE: at 23:16

## 2024-02-03 ASSESSMENT — ACTIVITIES OF DAILY LIVING (ADL)
ADLS_ACUITY_SCORE: 37
ADLS_ACUITY_SCORE: 23
ADLS_ACUITY_SCORE: 37
ADLS_ACUITY_SCORE: 23
ADLS_ACUITY_SCORE: 37
ADLS_ACUITY_SCORE: 23
ADLS_ACUITY_SCORE: 37
ADLS_ACUITY_SCORE: 23
ADLS_ACUITY_SCORE: 37
ADLS_ACUITY_SCORE: 37

## 2024-02-03 NOTE — PROGRESS NOTES
Pleasant, cooperative, complained of headache, tylenol given, vital signs stable.  Eating and drinking well.

## 2024-02-03 NOTE — ED NOTES
"Pt put on call light to use the restroom. Pt states desire to \"just go home.\" PT reminded that she is here for her safety. Pt states she is cold at this time so temp was taken. No fever at this time. Pt states she has a sore throat so lozenge was given. Water refilled and warm blankets given.   "

## 2024-02-03 NOTE — DISCHARGE INSTRUCTIONS
Patient belongings locked in locker #10 in red pod.    Behavioral Healthcare Providers Scheduling:  During your hospitalization, you were seen by a licensed mental health professional [facility] ([facility])  for a brief mental health assessment and/or psychotherapy at [facility] of [facility].  It is recommended that you follow up with your established providers (psychiatrist, mental health therapist, and/or primary care doctor - as relevant) as soon as possible. Coordinators from [facility] will be calling you in the next 24-48 hours to ensure that you have the resources you need. You can also contact [facility] coordinators directly at [phone].

## 2024-02-03 NOTE — H&P
[facility]  History and Physical - Hospitalist Service       Date of Admission:  1/31/2024  PRIMARY CARE PROVIDER:    Jelena Mackey    Assessment & Plan   Misty Parham is a 38 year old female with PMH of major depressive disorder who presented to the ED on 1/31 with suicidal ideation. She was placed on a 72 hour hold while in the ED on 2/1/24 11:47 am as symptoms are impacting her ability to safely and appropriately function in the community, and she is a high risk for death by suicide accidental or intentional due to mental health hx and substance use hx. Hospital staff have petitioned for MI civil commitment, awaiting court determination. She tested positive for COVID-19 infection on 2/2/24 and thus cannot discharge to inpatient psych until she is off of special precautions.     In the ED,  and saturating 93% on RA. Temp 101 F overnight. Appears non toxic. Endorses cough and sore throat, no dyspnea or chest pain. Patient states she has not been drinking much fluids secondary to sore throat. CBC and CMP unremarkable. D-dimer 0.44. CRP 38. CXR NAD.    Suicidal ideation  Borderline personality disorder  Severe episode of recurrent major depressive disorder without psychotic features  *Patient was placed on a 72 hour hold 2/1/24 at 11:47 am  -Consult psychiatry  -Continue Effexor, which was increased to 225 mg daily on 2/1  -Continue Zyprexa 10 mg HS  -Continue Metformin 500 mg BID, started on 2/1, to help minimize metabolic side effects related to Zyprexa  -Suicide precautions, 1:1 sitter  -Elopement precautions  -SW/CC consult    COVID-19 infection  *Tested positive on 2/2, saturating 95% on RA with temp 101 F noted early 2/3 AM. HR is elevated at 124 this afternoon but now down to 102. Patient states she has not been drinking much fluids due to sore throat. She also has an intermittent cough. No dyspnea or chest pain.  *CBC and CMP unremarkable, d-dimer 0.44, CRP 38, CXR  "NAD  -Start Paxlovid  -Check procalcitonin, if elevated will consider starting abx  -Check EKG  -IV NS x 1 L, encourage oral intake  -Antitussives as needed  -Lovenox for DVT ppx  -Requires special precautions for 10 days, with 2/2 considered day 0    Clinically Significant Risk Factors Present on Admission                       # Severe Obesity: Estimated body mass index is 51.59 kg/m  as calculated from the following:    Height as of this encounter: 1.651 m (5' 5\").    Weight as of this encounter: 140.6 kg (310 lb).       # Financial/Environmental Concerns: none          Diet: Regular Diet Adult    DVT Prophylaxis: Enoxaparin (Lovenox) SQ  Allen Catheter: Not present  Lines: None     Cardiac Monitoring: None  Code Status:  Full       Disposition Plan      Expected Discharge Date: 02/05/2024             Entered: Marifer Hartley PA-C 02/03/2024, 2:01 PM     The patient's care was discussed with the Attending Physician, Dr. Wyatt and Patient.    Marifer Hartley PA-C  [facility]  Securely message with the Vocera Web Console (learn more here)    ______________________________________________________________________    Chief Complaint   Suicidal ideation    History is obtained from the patient and EMR.      History of Present Illness   Misty Parham is a 38 year old female with PMH of major depressive disorder who presented to the ED on 1/31 with suicidal ideation. She was placed on a 72 hour hold while in the ED on 2/1/24 11:47 am as symptoms are impacting her ability to safely and appropriately function in the community, and she is a high risk for death by suicide accidental or intentional due to mental health hx and substance use hx. Hospital staff have petitioned for MI civil commitment, awaiting court determination. She tested positive for COVID-19 infection on 2/2/24 and thus cannot discharge to inpatient psych until she is off of contact precautions. Spiked temp 101 F on 2/3 " at 0236 but saturating appropriately on room air.    Past Medical History    I have reviewed this patient's medical history and updated it with pertinent information if needed.   Past Medical History:   Diagnosis Date    Depressive disorder     Generalized anxiety disorder 5/15/2020       Prior to Admission Medications   Prior to Admission Medications   Prescriptions Last Dose Informant Patient Reported? Taking?   ALPRAZolam (XANAX) 1 MG tablet 1/31/2024  Yes Yes   Sig: Take 1 tablet (1 mg) by mouth Three times daily   OLANZapine (ZYPREXA) 20 MG tablet   Yes No   Sig: Take 20 mg by mouth at bedtime   Vitamin D, Cholecalciferol, 25 MCG (1000 UT) CAPS 1/30/2024  Yes Yes   docusate sodium (COLACE) 100 MG capsule 1/31/2024  Yes Yes   Sig: Take 100 mg by mouth 2 times daily   melatonin 3 MG tablet 1/30/2024  Yes Yes   Sig: Take 3 mg by mouth daily as needed   polyethylene glycol (MIRALAX) 17 GM/Dose powder 1/30/2024  No Yes   Sig: Take 17 g by mouth daily   topiramate (TOPAMAX) 100 MG tablet   Yes No   Sig: Take one half tab (50 mg) daily x 14 days; then increase to one tab daily. D/C previous order for this med.   traZODone (DESYREL) 50 MG tablet   Yes No   Sig: Take 50 mg by mouth at bedtime   venlafaxine (EFFEXOR XR) 150 MG 24 hr capsule 1/30/2024  Yes Yes   Sig: Take 150 mg by mouth daily   zolpidem (AMBIEN) 10 MG tablet 1/30/2024  Yes Yes   Sig: Take 10 mg by mouth nightly as needed for sleep      Facility-Administered Medications: None     Allergies   No Known Allergies    Physical Exam   Vital Signs: Temp: (!) 101  F (38.3  C) Temp src: Oral BP: 135/85 Pulse: 79   Resp: 20 SpO2: 95 % O2 Device: None (Room air)    Weight: 310 lbs 0 oz    Constitutional: Awake, alert, cooperative, no apparent distress.    ENT: Normocephalic, without obvious abnormality, atraumatic. Eye pupils are equal. Normal sclera.    Neck: Supple, symmetrical, trachea midline  Pulmonary: No increased work of breathing, good air exchange, clear  to auscultation bilaterally  Cardiovascular: tachycardia, regular rhythm  GI: Normal bowel sounds, soft, non-distended, non-tender.   Skin: Visualized skin appeared clear.  Neuro: Oriented x 3. Moves all extremities spontaneously. No focal neuro deficits.  Psych:  flat, withdrawn  Extremities: Normal muscle tone. No gross deformities noted. Calves non-tender b/l. No pitting edema b/l LE    Medical Decision Making       60 MINUTES SPENT BY ME on the date of service doing chart review, history, exam, documentation & further activities per the note.         Data   Data reviewed today: I reviewed all medications, new labs and imaging results over the last 24 hours. I personally reviewed .      I have personally reviewed the following data over the past 24 hrs:    10.1  \   15.3   / 272     138 98 10.2 /  89   3.8 27 0.90 \     ALT: 39 AST: 43 AP: 85 TBILI: 0.3   ALB: 4.3 TOT PROTEIN: 7.6 LIPASE: N/A     Procal: N/A CRP: 38.18 (H) Lactic Acid: N/A       INR:  N/A PTT:  N/A   D-dimer:  0.44 Fibrinogen:  N/A       Imaging results reviewed over the past 24 hrs:   Recent Results (from the past 24 hour(s))   XR Chest 2 Views    Narrative    EXAM: XR CHEST 2 VIEWS  LOCATION: [facility]  DATE: 2/3/2024    INDICATION: COVID 19 infection  COMPARISON: None.      Impression    IMPRESSION: Negative chest. Lungs are clear.

## 2024-02-03 NOTE — ED NOTES
[facility]  ED Nurse Handoff Report    ED Chief complaint: Suicidal      ED Diagnosis:   Final diagnoses:   Borderline personality disorder (H)   Severe episode of recurrent major depressive disorder, without psychotic features (H)   Suicidal ideation   COVID-19       Code Status: Full Code    Allergies: No Known Allergies    Patient Story: 38 year old female with a past history notable for major depressive disorder who is currently under observation status on the EmPATH unit, now approaching 25 hours in the emergency department.  Overnight, there were no acute issues.  On reassessment today, the patient was noted to be resting in her recliner.  She accompanied me to the interview room for our meeting.  She explains that she continues to feel quite depressed, characterizing a score of 9 on a scale of 0-10 with 10 being most severe.  In the context of this intensity, she is requesting to discharge home so that she may proceed with ending her life by overdosing on medications.  There is no indication of psychosis or homicidal thoughts. Pt dx with covid and is symptomatic  pt to be admitted for mental health after medical clearance     Focused Assessment:    Results for orders placed or performed during the hospital encounter of 01/31/24   HCG qualitative urine     Status: Normal   Result Value Ref Range    hCG Urine Qualitative Negative Negative   UA Macroscopic with reflex to Microscopic and Culture     Status: Abnormal    Specimen: Urine, Clean Catch   Result Value Ref Range    Color Urine Light Yellow Colorless, Straw, Light Yellow, Yellow    Appearance Urine Slightly Cloudy (A) Clear    Glucose Urine Negative Negative mg/dL    Bilirubin Urine Negative Negative    Ketones Urine Trace (A) Negative mg/dL    Specific Gravity Urine 1.009 1.003 - 1.035    Blood Urine Negative Negative    pH Urine 5.0 5.0 - 7.0    Protein Albumin Urine Negative Negative mg/dL    Urobilinogen Urine Normal Normal, 2.0  mg/dL    Nitrite Urine Negative Negative    Leukocyte Esterase Urine Negative Negative    Mucus Urine Present (A) None Seen /LPF    RBC Urine <1 <=2 /HPF    WBC Urine 2 <=5 /HPF    Squamous Epithelials Urine 10 (H) <=1 /HPF    Narrative    Urine Culture not indicated   Asymptomatic COVID-19 Virus (Coronavirus) by PCR Nasopharyngeal     Status: Abnormal    Specimen: Nasopharyngeal; Swab   Result Value Ref Range    SARS CoV2 PCR Positive (A) Negative    Narrative    Testing was performed using the Xpert Xpress SARS-CoV-2 Assay on the Cepheid Gene-Xpert Instrument Systems. Additional information about this Emergency Use Authorization (EUA) assay can be found via the Lab Guide. This test should be ordered for the detection of SARS-CoV-2 in individuals who meet SARS-CoV-2 clinical and/or epidemiological criteria as well as from individuals without symptoms or other reasons to suspect COVID-19. Test performance for asymptomatic patients has only been established in anterior nasal swab specimens. This test is for in vitro diagnostic use under the FDA EUA for laboratories certified under CLIA to perform high complexity testing. This test has not been FDA cleared or approved. A negative result does not rule out the presence of PCR inhibitors in the specimen or target RNA concentration below the limit of detection for the assay. The possibility of a false negative should be considered if the patient's recent exposure or clinical presentation suggests COVID-19. This test was validated by the [facility]. This laboratory is certified under the Clinical Laboratory Improvement Amendments (CLIA) as qualified to perform high complexity laboratory testing.     Urine Drug Screen Panel     Status: Normal   Result Value Ref Range    Amphetamines Urine Screen Negative Screen Negative    Barbituates Urine Screen Negative Screen Negative    Benzodiazepine Urine Screen Negative Screen Negative     Cannabinoids Urine Screen Negative Screen Negative    Cocaine Urine Screen Negative Screen Negative    Fentanyl Qual Urine Screen Negative Screen Negative    Opiates Urine Screen Negative Screen Negative    PCP Urine Screen Negative Screen Negative   CBC with platelets and differential     Status: Abnormal   Result Value Ref Range    WBC Count 10.1 4.0 - 11.0 10e3/uL    RBC Count 5.32 (H) 3.80 - 5.20 10e6/uL    Hemoglobin 15.3 11.7 - 15.7 g/dL    Hematocrit 48.7 (H) 35.0 - 47.0 %    MCV 92 78 - 100 fL    MCH 28.8 26.5 - 33.0 pg    MCHC 31.4 (L) 31.5 - 36.5 g/dL    RDW 14.7 10.0 - 15.0 %    Platelet Count 272 150 - 450 10e3/uL    % Neutrophils 63 %    % Lymphocytes 23 %    % Monocytes 13 %    % Eosinophils 0 %    % Basophils 1 %    % Immature Granulocytes 0 %    NRBCs per 100 WBC 0 <1 /100    Absolute Neutrophils 6.4 1.6 - 8.3 10e3/uL    Absolute Lymphocytes 2.3 0.8 - 5.3 10e3/uL    Absolute Monocytes 1.3 0.0 - 1.3 10e3/uL    Absolute Eosinophils 0.0 0.0 - 0.7 10e3/uL    Absolute Basophils 0.1 0.0 - 0.2 10e3/uL    Absolute Immature Granulocytes 0.0 <=0.4 10e3/uL    Absolute NRBCs 0.0 10e3/uL   Urine Drug Screen     Status: Normal    Narrative    The following orders were created for panel order Urine Drug Screen.  Procedure                               Abnormality         Status                     ---------                               -----------         ------                     Urine Drug Screen Panel[[id number]]      Normal              Final result                 Please view results for these tests on the individual orders.   CBC with Platelets & Differential     Status: Abnormal    Narrative    The following orders were created for panel order CBC with Platelets & Differential.  Procedure                               Abnormality         Status                     ---------                               -----------         ------                     CBC with platelets and d...[[id number]]  Abnormal             Final result                 Please view results for these tests on the individual orders.         Treatments and/or interventions provided:   Patient's response to treatments and/or interventions:     To be done/followed up on inpatient unit:  monitor    Does this patient have any cognitive concerns?: no    Activity level - Baseline/Home:  Independent  Activity Level - Current:   Independent    Patient's Preferred language: English   Needed?: No    Isolation: None  Infection: Not Applicable  Patient tested for COVID 19 prior to admission: YES  Bariatric?: Yes    Vital Signs:   Vitals:    02/02/24 1800 02/02/24 2115 02/03/24 0236 02/03/24 1415   BP: 128/65 (!) 119/103 135/85 138/64   Pulse: 85 83 79 (!) 124   Resp:   20 18   Temp:   (!) 101  F (38.3  C)    TempSrc:   Oral    SpO2: 95%  95% 93%   Weight:       Height:           Cardiac Rhythm:     Was the PSS-3 completed:   Yes  What interventions are required if any?               Family Comments:   OBS brochure/video discussed/provided to patient/family: No              Name of person given brochure if not patient:               Relationship to patient:     For the majority of the shift this patient's behavior was Green.   Behavioral interventions performed were .    ED NURSE PHONE NUMBER: [phone]

## 2024-02-03 NOTE — PROGRESS NOTES
.Admission    Patient arrives to room 6627 via cart from ED.    Care plan note: Pt admitted from Home for SI. Pt was In [facility] but still had SI and stated wanting to go home. Psych and Mental health was consulted, and a 72 hr hold began at 02/01/24 at 1147. Pt was found to be positive with Covid on 02/02/24. Pt is a suicide and elopement risk.     Inpatient nursing criteria listed below were met:    Did you put disposition on whiteboard and in sticky note: Yes  Full skin assessment done (add LDA if skin issue present). Initials of 2nd RN :Yes: SI  Isolation education started/completed Yes  Patient allergies verified with patient: Yes  Fall Risk? (Care plan updated, Education given and documented) NA  Primary Care Plan initiated: Yes  Home medications documented in belongings flowsheet: NA  Patient belongings documented in belongings flowsheet: Yes  Reminder note (belongings/ medications) placed in discharge instructions:Yes  Admission profile/ required documentation complete: Yes  If patient is a 72 hour hold/Commitment are belongings removed from room and locked up? Yes

## 2024-02-03 NOTE — PROGRESS NOTES
PSS-3      Date and Time Over the past 2 weeks have you felt down, depressed, or hopeless? Over the past 2 weeks have you had thoughts of killing yourself? Have you ever attempted to kill yourself? When did this last happen? User   02/03/24 1600 yes yes yes more than 6 months ago CELINE   01/31/24 1322 yes yes yes more than 6 months ago ENV   01/31/24 1257 refused refused refused -- SHRUTHI MAXWELL-SSRS ([place])      Date and Time Q1 Wished to be Dead (Past Month) Q2 Suicidal Thoughts (Past Month) Q3 Suicidal Thought Method Q4 Suicidal Intent without Specific Plan Q5 Suicide Intent with Specific Plan Q6 Suicide Behavior (Lifetime) Within the Past 3 Months? RETIRED: Level of Risk per Screen Screening Not Complete User   01/31/24 1900 yes yes yes yes yes -- -- -- -- LF   01/31/24 1858 -- -- -- -- -- yes -- -- -- LF              Patient states having active thoughts of suicide that's intermittent.  When asked if pt has any general or specific plans to harm themselves, they declined.

## 2024-02-04 LAB
BASOPHILS # BLD AUTO: 0.1 10E3/UL (ref 0–0.2)
BASOPHILS NFR BLD AUTO: 1 %
CK SERPL-CCNC: 23 U/L (ref 26–192)
CRP SERPL-MCNC: 50.39 MG/L
EOSINOPHIL # BLD AUTO: 0.2 10E3/UL (ref 0–0.7)
EOSINOPHIL NFR BLD AUTO: 3 %
ERYTHROCYTE [DISTWIDTH] IN BLOOD BY AUTOMATED COUNT: 14.6 % (ref 10–15)
HCT VFR BLD AUTO: 42.1 % (ref 35–47)
HGB BLD-MCNC: 13.4 G/DL (ref 11.7–15.7)
IMM GRANULOCYTES # BLD: 0 10E3/UL
IMM GRANULOCYTES NFR BLD: 0 %
LYMPHOCYTES # BLD AUTO: 2.4 10E3/UL (ref 0.8–5.3)
LYMPHOCYTES NFR BLD AUTO: 37 %
MCH RBC QN AUTO: 28.6 PG (ref 26.5–33)
MCHC RBC AUTO-ENTMCNC: 31.8 G/DL (ref 31.5–36.5)
MCV RBC AUTO: 90 FL (ref 78–100)
MONOCYTES # BLD AUTO: 0.9 10E3/UL (ref 0–1.3)
MONOCYTES NFR BLD AUTO: 13 %
NEUTROPHILS # BLD AUTO: 3 10E3/UL (ref 1.6–8.3)
NEUTROPHILS NFR BLD AUTO: 46 %
NRBC # BLD AUTO: 0 10E3/UL
NRBC BLD AUTO-RTO: 0 /100
PLATELET # BLD AUTO: 229 10E3/UL (ref 150–450)
RBC # BLD AUTO: 4.68 10E6/UL (ref 3.8–5.2)
TROPONIN T SERPL HS-MCNC: <6 NG/L
WBC # BLD AUTO: 6.5 10E3/UL (ref 4–11)

## 2024-02-04 PROCEDURE — 250N000013 HC RX MED GY IP 250 OP 250 PS 637: Performed by: PHYSICIAN ASSISTANT

## 2024-02-04 PROCEDURE — 93010 ELECTROCARDIOGRAM REPORT: CPT | Performed by: INTERNAL MEDICINE

## 2024-02-04 PROCEDURE — 99233 SBSQ HOSP IP/OBS HIGH 50: CPT | Performed by: HOSPITALIST

## 2024-02-04 PROCEDURE — 36415 COLL VENOUS BLD VENIPUNCTURE: CPT | Performed by: PHYSICIAN ASSISTANT

## 2024-02-04 PROCEDURE — 93005 ELECTROCARDIOGRAM TRACING: CPT

## 2024-02-04 PROCEDURE — 250N000013 HC RX MED GY IP 250 OP 250 PS 637: Performed by: PSYCHIATRY & NEUROLOGY

## 2024-02-04 PROCEDURE — 86140 C-REACTIVE PROTEIN: CPT | Performed by: HOSPITALIST

## 2024-02-04 PROCEDURE — 250N000013 HC RX MED GY IP 250 OP 250 PS 637

## 2024-02-04 PROCEDURE — 120N000001 HC R&B MED SURG/OB

## 2024-02-04 PROCEDURE — 250N000011 HC RX IP 250 OP 636: Performed by: PHYSICIAN ASSISTANT

## 2024-02-04 PROCEDURE — 36415 COLL VENOUS BLD VENIPUNCTURE: CPT | Performed by: HOSPITALIST

## 2024-02-04 PROCEDURE — 85025 COMPLETE CBC W/AUTO DIFF WBC: CPT | Performed by: PHYSICIAN ASSISTANT

## 2024-02-04 PROCEDURE — 84484 ASSAY OF TROPONIN QUANT: CPT | Performed by: HOSPITALIST

## 2024-02-04 PROCEDURE — 80053 COMPREHEN METABOLIC PANEL: CPT | Performed by: PHYSICIAN ASSISTANT

## 2024-02-04 RX ORDER — DIPHENHYDRAMINE HYDROCHLORIDE AND LIDOCAINE HYDROCHLORIDE AND ALUMINUM HYDROXIDE AND MAGNESIUM HYDRO
10 KIT EVERY 6 HOURS PRN
Status: DISCONTINUED | OUTPATIENT
Start: 2024-02-04 | End: 2024-02-15 | Stop reason: HOSPADM

## 2024-02-04 RX ADMIN — VENLAFAXINE HYDROCHLORIDE 225 MG: 150 CAPSULE, EXTENDED RELEASE ORAL at 08:05

## 2024-02-04 RX ADMIN — METFORMIN HYDROCHLORIDE 500 MG: 500 TABLET ORAL at 17:03

## 2024-02-04 RX ADMIN — ENOXAPARIN SODIUM 40 MG: 40 INJECTION SUBCUTANEOUS at 17:03

## 2024-02-04 RX ADMIN — METFORMIN HYDROCHLORIDE 500 MG: 500 TABLET ORAL at 08:05

## 2024-02-04 RX ADMIN — OLANZAPINE 10 MG: 5 TABLET, FILM COATED ORAL at 21:26

## 2024-02-04 RX ADMIN — Medication 1 LOZENGE: at 17:14

## 2024-02-04 RX ADMIN — ENOXAPARIN SODIUM 40 MG: 40 INJECTION SUBCUTANEOUS at 06:16

## 2024-02-04 RX ADMIN — ACETAMINOPHEN 650 MG: 325 TABLET, FILM COATED ORAL at 06:15

## 2024-02-04 ASSESSMENT — ACTIVITIES OF DAILY LIVING (ADL)
ADLS_ACUITY_SCORE: 23

## 2024-02-04 NOTE — PLAN OF CARE
Goal Outcome Evaluation:  DATE & TIME: 2/4 [id number]    Cognitive Concerns/ Orientation : A&O x 4 calm and co,soft spoken, flat affect,   BEHAVIOR & AGGRESSION TOOL COLOR: Green  CIWA SCORE: NA   ABNL VS/O2: VSS on RA.   MOBILITY: Independent  PAIN MANAGMENT: c/o of sore throat, given PRN lozenges x1  BOWEL/BLADDER: continent  ABNL LAB/BG: Crp 38.18, Ck 23  DRAIN/DEVICES: L PIV SL  TELEMETRY RHYTHM: NA  SKIN:  WNL  TESTS/PROCEDURES: none   D/C DAY/GOALS/PLACE: pending progress  OTHER IMPORTANT INFO: continues to have intermittent SI, suicidal precautions maintained, sitter at bed side, 72 hr hold  started on 2/1 at 11:47 am. Psych following, plan to discharge  inpatient psych when medically stable. CC/SW consult placed.

## 2024-02-04 NOTE — PLAN OF CARE
Summary:  admitted from Ed with suicidal ideations and covid +VE  DATE & TIME: 2/3-2/4 8107- 0130    Cognitive Concerns/ Orientation : A&O x 4 calm and co,soft spoken, flat affect,   BEHAVIOR & AGGRESSION TOOL COLOR: Green  CIWA SCORE: NA   ABNL VS/O2: VSS on RA sating 95% ex tachy (in low 100s), temp 99-100F, given PRN tylenol x2  MOBILITY: Independent  PAIN MANAGMENT: c/o of sore throat, given PRN lozenges x2  BOWEL/BLADDER: continent  ABNL LAB/BG: Crp 38.8  DRAIN/DEVICES: L PIV SL  TELEMETRY RHYTHM: NA  SKIN:  WNL  TESTS/PROCEDURES: none   D/C DAY/GOALS/PLACE: pending progress  OTHER IMPORTANT INFO: suicidal precautions,  sitter at bed side,on 72 hr hold  started on 2/1 at 11:47 am. Psych following, plan to discharge to inpatient psych when medically stable. CC/SW consult placed, slept well throughout the night    Current condition (current mood & behavior): calm, cooperative and pleasant, flat  Sitter present: yes  Every 15 minute documentation by NA/RN completed for Shift: yes  Room safety Suicide Checklist completed in Epic: yes  Patient's color of severity (suicide scale): YELLOW  Order for psych consult placed (if appropriate): yes  Suicide care plan added: yes

## 2024-02-04 NOTE — CONSULTS
Care Management Initial Consult    General Information  Assessment completed with: VM-chart review,    Type of CM/SW Visit: Initial Assessment    Primary Care Provider verified and updated as needed:     Readmission within the last 30 days:        Reason for Consult: discharge planning, mental health concerns  Advance Care Planning:            Communication Assessment  Patient's communication style: spoken language (English or Bilingual)    Hearing Difficulty or Deaf: no   Wear Glasses or Blind: yes    Cognitive  Cognitive/Neuro/Behavioral: WDL                      Living Environment:   People in home: parent(s)     Current living Arrangements: house      Able to return to prior arrangements:         Family/Social Support:  Care provided by: self  Provides care for: child(bennie)  Marital Status:   Sibling(s), Parent(s)          Description of Support System:           Current Resources:   Patient receiving home care services: No     Community Resources: None  Equipment currently used at home: none  Supplies currently used at home:      Employment/Financial:  Employment Status: disabled        Financial Concerns: none   Referral to Financial Worker: No       Does the patient's insurance plan have a 3 day qualifying hospital stay waiver?  No    Lifestyle & Psychosocial Needs:  Social Determinants of Health     Food Insecurity: Not on file   Depression: Not at risk (8/23/2023)    PHQ-2     PHQ-2 Score: 2   Recent Concern: Depression - At risk (8/23/2023)    PHQ-2     PHQ-2 Score: 3   Housing Stability: Not on file   Tobacco Use: Low Risk  (12/14/2023)    Patient History     Smoking Tobacco Use: Never     Smokeless Tobacco Use: Never     Passive Exposure: Not on file   Financial Resource Strain: Not on file   Alcohol Use: Not At Risk (9/12/2019)    AUDIT-C     Frequency of Alcohol Consumption: Never     Average Number of Drinks: Not on file     Frequency of Binge Drinking: Not on file   Transportation Needs: Not  on file   Physical Activity: Not on file   Interpersonal Safety: Not on file   Stress: Not on file   Social Connections: Not on file       Functional Status:  Prior to admission patient needed assistance:              Mental Health Status:  Mental Health Status: Current Concern       Chemical Dependency Status:  Chemical Dependency Status: No Current Concerns             Values/Beliefs:  Spiritual, Cultural Beliefs, Yazdanism Practices, Values that affect care: yes  Description of Beliefs that Will Affect Care: Pt reports she converted to Anglican when she was .            Additional Information:  SW/CM consult for discharge planning. SW reviewed chart. Pt transferred from the [facility] unit due to Covid status. Per psych, plan is to transfer to in-pt mental health, when appropriate. Pt is currently on a 72 hold as of 2/1 at 1147. While in [facility], on 2/2, a petition for civil commitment was started through [facility] for mental illness. Anticipate a decision will be made on Monday if the [facility] will support the petition. SW following.     ÓSCAR Youngblood, Pen[facility] Valley HospitalSW  [phone] Desk phone  [phone] Cell/text (Preferred)  [facility]

## 2024-02-04 NOTE — PROGRESS NOTES
Current condition (current mood & behavior): calm, co operative  Sitter present: yes  Every 15 minute documentation by NA/RN completed for Shift: yes  Room safety Suicide Checklist completed in Epic: yes  Patient's color of severity (suicide scale)yellow:   Order for psych consult placed (if appropriate): yes  Suicide care plan added: yes  Pollo Lopez RN

## 2024-02-04 NOTE — PLAN OF CARE
Goal Outcome Evaluation:Summary:  admitted from Ed with suicidal ideations and covid +VE  DATE & TIME: 2/3/24 1530- 1930    Cognitive Concerns/ Orientation : A&O x 4 , soft spoken, flat affect  BEHAVIOR & AGGRESSION TOOL COLOR: Green  CIWA SCORE: NA   ABNL VS/O2: T max 100.2, HR tachy in 100s,On RA sats 95%.  MOBILITY: Independent  PAIN MANAGMENT: c/o of sore throat  BOWEL/BLADDER: continent  ABNL LAB/BG: Crp 38.8  DRAIN/DEVICES: PIV infusing  TELEMETRY RHYTHM: NA  SKIN:  WNL  TESTS/PROCEDURES: none   D/C DAY/GOALS/PLACE: pending progress  OTHER IMPORTANT INFO: continues to have   intermittent SI,suicidal precautions,sitter at bed side,on 72 hr hold,  started on 2/1 at 11:47 am.Psych following, plan to discharge to inpatient psych when medically stable.Started on Paxlovid.

## 2024-02-04 NOTE — PROGRESS NOTES
[facility]  Hospitalist Progress Note   02/04/2024          Assessment and Plan:       Misty Parham is a 38 year old female with major depressive disorder who presented to the ED on 1/31 with suicidal ideation. She was placed on a 72 hour hold while in the ED on 2/1/24 11:47 am as symptoms were impacting her ability to safely and appropriately function in the community, and she is at high risk for death by suicide accidental or intentional due to mental health hx and substance use hx. Hospital staff have petitioned for MI civil commitment, awaiting court determination.   Subsequently complained of sore throat, tested positive for COVID-19 infection on 2/2/24 and thus cannot discharge to inpatient psych until she is off of special precautions per report.  Hence admitted to inpatient unit on 2/3/2024 under hospitalist service.     COVID-19 infection - tested positive 2/2/2024  Complaint of sore throat, intermittent cough.  No chest pain or palpitations  Temperature 101F, heart rate 120s at the time of admission.    *WBC 10.1.  Hemoglobin 15.3.  Creatinine 0.90.  Liver enzymes within normal limits.  CK23, CRP 38.18.  Procalcitonin 0.18.  hCG qualitative negative.  D-dimer 0.44.    Chest x-ray no acute pathology.  Follow troponin, trend CRP.  Continue Paxlovid [2/3]  Continue Lovenox twice daily for DVT prophylaxis.  EKG ordered on 2/3 not done yet, requested to be done today.  Aggressive incentive spirometry.  Encourage ambulation.  As needed antitussives.  Cepacol lozenges.  Magic mouthwash ordered.     Tachycardia from fever, infection improving.  -- Per report patient not drinking much fluids due to sore throat, intermittent cough.   -- Started on Paxlovid, fever improved.  Received IV fluids.  Tachycardia improving.  UA on admission with trace ketones.  Nitrite negative, leukocyte esterase negative.  Monitor.  EKG still pending.  Will review.    Recent TSH from December within normal  limits.    Suicidal ideation  Borderline personality disorder  Severe episode of recurrent major depressive disorder without psychotic features  *Patient was placed on a 72 hour hold 2/1/24 at 11:47 am  -Consult psychiatry -await input.  -Continue Effexor, which was increased to 225 mg daily on 2/1.  QTc to be reviewed.  Pending EKG  -Continue Zyprexa 10 mg HS  -Continue Metformin 500 mg BID, started on 2/1, to help minimize metabolic side effects related to Zyprexa  -Suicide precautions, 1:1 sitter  -Elopement precautions  -SW/CC consult.  Supportive care.     Medically complicated obesity with a BMI of 51.59.  Increase all-cause mortality and morbidity.    consider lifestyle modification with diet and exercise as able to.    Possible JEAN PIERRE/OHS.  Sleep studies as outpatient recommended    Orders Placed This Encounter      Regular Diet Adult      DVT Prophylaxis: SCDs, subcutaneous Lovenox twice daily.  Code Status: Full Code  Disposition: Expected discharge pending inpatient psychiatric bed availability, psych input, safe discharge plan in place    Discussed with patient, bedside RN, sitter by the bedside.  >51 minutes spent by me on the date of service doing chart review, history, exam, documentation & further activities per the note.        Vikash Wyatt MD        Interval History:        Patient lying in bed.  Denies any chest pain or palpitations.  Reports some shortness of breath, sore throat.  Denies any new tingling or numbness.  Denies any abdominal pain.  Denies any diarrhea.  Denies any headache or dizziness.  Does admit to having on and off suicidal ideation.  Having sitter at bedside.  Awaiting psychiatry evaluation.  On contact precautions.  Tmax 100.2.  Tachycardia improving.       Physical Exam:        Physical Exam   Temp:  [98.5  F (36.9  C)-100.2  F (37.9  C)] 98.5  F (36.9  C)  Pulse:  [] 77  Resp:  [16-19] 16  BP: (110-137)/(83-91) 125/84  SpO2:  [93 %-95 %] 94 %    Intake/Output Summary  (Last 24 hours) at 2/4/2024 1515  Last data filed at 2/4/2024 0939  Gross per 24 hour   Intake 1580 ml   Output --   Net 1580 ml       Admission Weight: 140.6 kg (310 lb)  Current Weight: 140.6 kg (310 lb)    PHYSICAL EXAM  GENERAL: Patient is in no distress. Alert and oriented.  HEENT: Oropharynx pink  HEART: Regular rate and rhythm. S1S2. No murmurs  LUNGS: Technically difficult due to body habitus Respirations unlabored  NEURO: Moving all extremities  EXTREMITIES: No pedal edema  SKIN: Warm, dry. No rash  PSYCHIATRY Cooperative       Medications:         enoxaparin ANTICOAGULANT  40 mg Subcutaneous Q12H    metFORMIN  500 mg Oral BID w/meals    nirmatrelvir and ritonavir  3 tablet Oral BID    OLANZapine  10 mg Oral At Bedtime    sodium chloride (PF)  3 mL Intracatheter Q8H    venlafaxine  225 mg Oral Daily     acetaminophen **OR** acetaminophen, ALPRAZolam, sore throat, benzonatate, bisacodyl, calcium carbonate, docusate sodium, guaiFENesin, lidocaine 4%, lidocaine (buffered or not buffered), ondansetron **OR** ondansetron, senna-docusate **OR** senna-docusate, sodium chloride (PF), zolpidem         Data:      All new lab and imaging data was reviewed.

## 2024-02-04 NOTE — PROGRESS NOTES
.Current condition (current mood & behavior): calm, cooperative and pleasant, flat  Sitter present: yes  Every 15 minute documentation by NA/RN completed for Shift: yes  Room safety Suicide Checklist completed in Epic: yes  Patient's color of severity (suicide scale): YELLOW  Order for psych consult placed (if appropriate): yes  Suicide care plan added: yes      Elva Sorto, RN

## 2024-02-05 LAB
ALBUMIN SERPL BCG-MCNC: 3.5 G/DL (ref 3.5–5.2)
ALP SERPL-CCNC: 62 U/L (ref 40–150)
ALT SERPL W P-5'-P-CCNC: 24 U/L (ref 0–50)
ANION GAP SERPL CALCULATED.3IONS-SCNC: 9 MMOL/L (ref 7–15)
AST SERPL W P-5'-P-CCNC: 25 U/L (ref 0–45)
BILIRUB SERPL-MCNC: 0.2 MG/DL
BUN SERPL-MCNC: 6.9 MG/DL (ref 6–20)
CALCIUM SERPL-MCNC: 9.1 MG/DL (ref 8.6–10)
CHLORIDE SERPL-SCNC: 104 MMOL/L (ref 98–107)
CREAT SERPL-MCNC: 0.74 MG/DL (ref 0.51–0.95)
DEPRECATED HCO3 PLAS-SCNC: 26 MMOL/L (ref 22–29)
EGFRCR SERPLBLD CKD-EPI 2021: >90 ML/MIN/1.73M2
GLUCOSE SERPL-MCNC: 102 MG/DL (ref 70–99)
POTASSIUM SERPL-SCNC: 3.6 MMOL/L (ref 3.4–5.3)
PROT SERPL-MCNC: 6.3 G/DL (ref 6.4–8.3)
SODIUM SERPL-SCNC: 139 MMOL/L (ref 135–145)

## 2024-02-05 PROCEDURE — 250N000011 HC RX IP 250 OP 636: Performed by: PHYSICIAN ASSISTANT

## 2024-02-05 PROCEDURE — 250N000013 HC RX MED GY IP 250 OP 250 PS 637: Performed by: PHYSICIAN ASSISTANT

## 2024-02-05 PROCEDURE — 250N000013 HC RX MED GY IP 250 OP 250 PS 637: Performed by: PSYCHIATRY & NEUROLOGY

## 2024-02-05 PROCEDURE — 250N000013 HC RX MED GY IP 250 OP 250 PS 637

## 2024-02-05 PROCEDURE — 99223 1ST HOSP IP/OBS HIGH 75: CPT | Performed by: STUDENT IN AN ORGANIZED HEALTH CARE EDUCATION/TRAINING PROGRAM

## 2024-02-05 PROCEDURE — 120N000001 HC R&B MED SURG/OB

## 2024-02-05 PROCEDURE — 99232 SBSQ HOSP IP/OBS MODERATE 35: CPT | Performed by: HOSPITALIST

## 2024-02-05 RX ADMIN — ZOLPIDEM TARTRATE 5 MG: 5 TABLET ORAL at 21:20

## 2024-02-05 RX ADMIN — METFORMIN HYDROCHLORIDE 500 MG: 500 TABLET ORAL at 08:15

## 2024-02-05 RX ADMIN — METFORMIN HYDROCHLORIDE 500 MG: 500 TABLET ORAL at 17:14

## 2024-02-05 RX ADMIN — ENOXAPARIN SODIUM 40 MG: 40 INJECTION SUBCUTANEOUS at 05:32

## 2024-02-05 RX ADMIN — OLANZAPINE 10 MG: 5 TABLET, FILM COATED ORAL at 21:19

## 2024-02-05 RX ADMIN — ENOXAPARIN SODIUM 40 MG: 40 INJECTION SUBCUTANEOUS at 17:14

## 2024-02-05 RX ADMIN — VENLAFAXINE HYDROCHLORIDE 225 MG: 150 CAPSULE, EXTENDED RELEASE ORAL at 08:14

## 2024-02-05 RX ADMIN — Medication 1 LOZENGE: at 08:15

## 2024-02-05 RX ADMIN — ACETAMINOPHEN 650 MG: 325 TABLET, FILM COATED ORAL at 08:14

## 2024-02-05 ASSESSMENT — ACTIVITIES OF DAILY LIVING (ADL)
ADLS_ACUITY_SCORE: 23

## 2024-02-05 NOTE — CONSULTS
Initial Psychiatric Consult   Consult date: February 5, 2024         Reason for Consult, requesting source:    Reason for Consult: suicide risk assessment   Requesting source: Vikash Wyatt    Labs and imaging reviewed.     Total time spent in chart review, patient interview and coordination of care; 60 min         HPI:   37 yo woman with major depression and borderline personality disorder admitted to [facility] for suicidal ideas but admitted to med surg for COVID. [facility] had filed for commitment due to high suicide risk and the petition was upheld. Psychiatry was consulted for further psych dispo and management.     Currently in treatment with NP at [facility] (Betty Cazares NP) who diagnosed her with MDD, IVY and BPD and has her on effexor, Zyprexa ambien, trazodone and xanax. Last fill of Xanax in 12/2023 and last fill of zolpidem on 1/13/2024.     On interview today patient says she has been feeling somewhat better since restarting on her medications. She says she stopped her olanzapine about 3 months ago for weight gain and has been progressively feeling worse. She apparently texted her psychiatric NP a suicidal message and has been thinking about overdosing on trazodone.  However she now feels she is improved and that she is better off with outpatient treatment. When asked what keeps her alive despite her prior suicide attempts cites her children. Graduated from DBT program in 1/2024 which had been helpful. Currently lives with her parents.         Past Psychiatric History:   Past Diagnoses: MDD, IVY, BPD   Medication Trials: zyprexa, effexor, ambien, trazodone, xanax   Past/Current Providers: Betty Cazares NP  Psychiatric Hospitalizations: multiple   Suicide Attempts: multiple         Substance Use and History:   None     Social History     Tobacco Use    Smoking status: Never    Smokeless tobacco: Never   Substance Use Topics    Alcohol use: Not Currently           Past Medical History:   PAST MEDICAL  HISTORY:   Past Medical History:   Diagnosis Date    Depressive disorder     Generalized anxiety disorder 5/15/2020       PAST SURGICAL HISTORY:   Past Surgical History:   Procedure Laterality Date    CHOLECYSTECTOMY               Family History:   FAMILY HISTORY:   Family History   Problem Relation Age of Onset    Diabetes Father            Social History:     Current Living arrangement: lives with parents   Relationships: single   Children: 2 children, family member takes care of children   Occupation/Finances: unemployed   Local Support: parents          Physical ROS:   The 10 point Review of Systems is negative other than noted in the HPI or here.           Medications:      enoxaparin ANTICOAGULANT  40 mg Subcutaneous Q12H    metFORMIN  500 mg Oral BID w/meals    nirmatrelvir and ritonavir  3 tablet Oral BID    OLANZapine  10 mg Oral At Bedtime    sodium chloride (PF)  3 mL Intracatheter Q8H    venlafaxine  225 mg Oral Daily              Allergies:   No Known Allergies       Labs:     Recent Results (from the past 48 hour(s))   EKG 12-lead, tracing only    Collection Time: 02/04/24  3:32 PM   Result Value Ref Range    Systolic Blood Pressure  mmHg    Diastolic Blood Pressure  mmHg    Ventricular Rate 80 BPM    Atrial Rate 80 BPM    MN Interval 144 ms    QRS Duration 76 ms     ms    QTc 449 ms    P Axis 50 degrees    R AXIS 26 degrees    T Axis 28 degrees    Interpretation ECG       Sinus rhythm  Normal ECG  When compared with ECG of 10-FEB-2021 12:25,  No significant change was found     Troponin T, High Sensitivity    Collection Time: 02/04/24  3:51 PM   Result Value Ref Range    Troponin T, High Sensitivity <6 <=14 ng/L   CRP inflammation    Collection Time: 02/04/24  3:51 PM   Result Value Ref Range    CRP Inflammation 50.39 (H) <5.00 mg/L   Comprehensive metabolic panel    Collection Time: 02/04/24 11:26 PM   Result Value Ref Range    Sodium 139 135 - 145 mmol/L    Potassium 3.6 3.4 - 5.3 mmol/L     "Carbon Dioxide (CO2) 26 22 - 29 mmol/L    Anion Gap 9 7 - 15 mmol/L    Urea Nitrogen 6.9 6.0 - 20.0 mg/dL    Creatinine 0.74 0.51 - 0.95 mg/dL    GFR Estimate >90 >60 mL/min/1.73m2    Calcium 9.1 8.6 - 10.0 mg/dL    Chloride 104 98 - 107 mmol/L    Glucose 102 (H) 70 - 99 mg/dL    Alkaline Phosphatase 62 40 - 150 U/L    AST 25 0 - 45 U/L    ALT 24 0 - 50 U/L    Protein Total 6.3 (L) 6.4 - 8.3 g/dL    Albumin 3.5 3.5 - 5.2 g/dL    Bilirubin Total 0.2 <=1.2 mg/dL   CBC with platelets and differential    Collection Time: 02/04/24 11:26 PM   Result Value Ref Range    WBC Count 6.5 4.0 - 11.0 10e3/uL    RBC Count 4.68 3.80 - 5.20 10e6/uL    Hemoglobin 13.4 11.7 - 15.7 g/dL    Hematocrit 42.1 35.0 - 47.0 %    MCV 90 78 - 100 fL    MCH 28.6 26.5 - 33.0 pg    MCHC 31.8 31.5 - 36.5 g/dL    RDW 14.6 10.0 - 15.0 %    Platelet Count 229 150 - 450 10e3/uL    % Neutrophils 46 %    % Lymphocytes 37 %    % Monocytes 13 %    % Eosinophils 3 %    % Basophils 1 %    % Immature Granulocytes 0 %    NRBCs per 100 WBC 0 <1 /100    Absolute Neutrophils 3.0 1.6 - 8.3 10e3/uL    Absolute Lymphocytes 2.4 0.8 - 5.3 10e3/uL    Absolute Monocytes 0.9 0.0 - 1.3 10e3/uL    Absolute Eosinophils 0.2 0.0 - 0.7 10e3/uL    Absolute Basophils 0.1 0.0 - 0.2 10e3/uL    Absolute Immature Granulocytes 0.0 <=0.4 10e3/uL    Absolute NRBCs 0.0 10e3/uL          Physical and Psychiatric Examination:     /74 (BP Location: Left arm)   Pulse 81   Temp 98.1  F (36.7  C) (Oral)   Resp 16   Ht 1.651 m (5' 5\")   Wt 140.6 kg (310 lb)   SpO2 96%   BMI 51.59 kg/m    Weight is 310 lbs 0 oz  Body mass index is 51.59 kg/m .    Physical Exam:  I have reviewed the physical exam as documented by by the medical team and agree with findings and assessment and have no additional findings to add at this time.         MSE:   Appearance: awake, alert  Attitude:  somewhat cooperative  Eye Contact:  fair  Mood:  \"fair\"  Affect:  slightly restricted  Speech:  clear, " "coherent  Psychomotor Behavior:  no evidence of tardive dyskinesia, dystonia, or tics  Muscle strength and tone: not formally assessed   Thought Process:  linear  Associations:  no loose associations  Thought Content:  no evidence of psychotic thought and passive suicidal ideation present  Insight:  fair  Judgement:  fair  Oriented to:  time, person, and place  Attention Span and Concentration:  intact  Recent and Remote Memory:  intact     EKG:          DSM-5 Diagnosis:   Borderline personality disorder   MDD recurrent moderate   IVY by history           Assessment:   39 yo woman with major depression and borderline personality disorder admitted to [facility] for suicidal ideas but admitted to med surg for COVID. Petition is upheld and tentative plan is for inpatient psych admission; however much of her affective instability appears to have been driven by her character vulnerability which may be cooling off in a structured setting. Psych consult team will cont to follow and do serial reassessment for appropriateness of inpatient psych admission. Patient is a high chronic risk of suicide given her MDD, borderline character construct, prior suicide attempts.           Summary of Recommendations:   Civil commitment petition to [facility] upheld. For now continue placement effort onto inpatient psych.      Continue Effexor and Zyprexa     If inpatient psych placement becomes protracted, psych consult team will continue to follow and reassess for appropriateness for discharge back to outpatient setting with follow up.       \"This dictation was performed with voice recognition software and may contain errors,  omissions and inadvertent word substitution.\"           "

## 2024-02-05 NOTE — PROGRESS NOTES
Current condition (current mood & behavior): calm/cooperative/pleasant   Sitter present: yes  Every 15 minute documentation by NA/RN completed for Shift: yes  Room safety Suicide Checklist completed in Epic: yes  Patient's color of severity (suicide scale): YELLOW  Order for psych consult placed (if appropriate): yes  Suicide care plan added: yes      Mireya Lundberg RN

## 2024-02-05 NOTE — PLAN OF CARE
Goal Outcome Evaluation:      Plan of Care Reviewed With: patient    Overall Patient Progress: no changeOverall Patient Progress: no change     Summary:  admitted from ED w/ suicidal ideations and covid +  DATE & TIME: 02/04-02/05 0326-1136   Cognitive Concerns/ Orientation : A&Ox 4, calm and cooperative   BEHAVIOR & AGGRESSION TOOL COLOR: Green  CIWA SCORE: NA   ABNL VS/O2: VSS on RA.   MOBILITY: Independent  PAIN MANAGMENT: Denies   BOWEL/BLADDER: Continent   ABNL LAB/BG: Crp 50.39  DRAIN/DEVICES: L PIV SL  TELEMETRY RHYTHM: NA  SKIN:  WNL  TESTS/PROCEDURES: none   D/C DAY/GOALS/PLACE: pending progress  OTHER IMPORTANT INFO: continues to have intermittent SI- none this shift,, suicidal precautions maintained, sitter at bed side, on 72 hr hold started on 2/1 at 11:47 am. Psych following, plan to discharge to inpatient psych when medically stable. CC/SW following.

## 2024-02-05 NOTE — PROGRESS NOTES
Care Management Follow Up    Length of Stay (days): 2    Expected Discharge Date: 02/15/2024     Concerns to be Addressed: discharge planning     Patient plan of care discussed at interdisciplinary rounds: Yes    Anticipated Discharge Disposition: Inpatient Mental Health     Anticipated Discharge Services:    Anticipated Discharge DME:      Patient/family educated on Medicare website which has current facility and service quality ratings:    Education Provided on the Discharge Plan:    Patient/Family in Agreement with the Plan:      Referrals Placed by /: [facility]  Private pay costs discussed:     Additional Information:  Patient transferred from [facility] on a 72 hour hold.  Per record, an MI petition was initiated in the ED.  Patient transferred to [facility] due to Covid Status in lieu of transfer to In Patient Psychitary.  Received a call this morning from [facility] [employer] Screening staff indicating the petition was staffed and the PPS department is supporting the petition.  They will submit their report to [facility] Attorney's Office and the Office will place patient on a Court Hold prior to the 72 hour hold expiring.  The Court calls the unit at [phone] and the call should be transferred to the Charge Nurse or bedside RN to accept the call and then document the time the Court Hold is in effect.     Monse Tong, SAYRASW

## 2024-02-05 NOTE — PROGRESS NOTES
[facility]  Hospitalist Progress Note   02/05/2024          Assessment and Plan:       Misty Parham is a 38 year old female with major depressive disorder who presented to the ED on 1/31 with suicidal ideation. She was placed on a 72 hour hold while in the ED on 2/1/24 11:47 am as symptoms were impacting her ability to safely and appropriately function in the community, and she is at high risk for death by suicide accidental or intentional due to mental health hx and substance use hx. Hospital staff have petitioned for MI civil commitment, awaiting court determination.     Subsequently complained of sore throat, tested positive for COVID-19 infection on 2/2/24 and thus cannot discharge to inpatient psych until she is off of special precautions per report.  Hence admitted to inpatient unit on 2/3/2024 under hospitalist service.     COVID-19 infection - tested positive 2/2/2024  Complaint of sore throat, intermittent cough.  No chest pain or palpitations  Temperature 101F, heart rate 120s at the time of admission.    *WBC 10.1.  Hemoglobin 15.3.  Creatinine 0.90.  Liver enzymes within normal limits.  CK 23, CRP 38.18.  Procalcitonin 0.18.  hCG qualitative negative.  D-dimer 0.44.  Troponin less than 6.  Chest x-ray no acute pathology.  Continue Paxlovid [2/3]  Continue Lovenox twice daily for DVT prophylaxis.  Follow CRP in AM.  Monitor O2 sats, will consider steroids if hypoxic.  Aggressive incentive spirometry.  Encourage ambulation.  As needed antitussives.  Cepacol lozenges.  Magic mouthwash ordered.     Tachycardia from fever, infection improved  -- Per report patient not drinking much fluids due to sore throat, intermittent cough.   --UA on admission with trace ketones.  Nitrite negative, leukocyte esterase negative.  EKG sinus rhythm, heart rate 80, QTc 449  Recent TSH from December within normal limits.  -- Started on Paxlovid, fever improved.  Received IV fluids.  Tachycardia  improved.    Suicidal ideation  Borderline personality disorder  Severe episode of recurrent major depressive disorder without psychotic features  *Patient was placed on a 72 hour hold 2/1/24 at 11:47 am  --Psychiatry consult requested.  Await input.  -Continue Effexor, which was increased to 225 mg daily on 2/1.  QTc within normal limits.  -Continue Zyprexa 10 mg HS  -Continue Metformin 500 mg BID, started on 2/1, to help minimize metabolic side effects related to Zyprexa  -Suicide precautions, 1:1 sitter  -Elopement precautions  -SW/CC consult.  Supportive care.     Medically complicated obesity with a BMI of 51.59.  Increase all-cause mortality and morbidity.    consider lifestyle modification with diet and exercise as able to.    Possible JEAN PIERRE/OHS.  Sleep studies as outpatient recommended    Orders Placed This Encounter      Regular Diet Adult      DVT Prophylaxis: SCDs, subcutaneous Lovenox twice daily.  Code Status: Full Code  Disposition: Expected discharge pending inpatient psychiatric bed availability, psych input, safe discharge plan in place    Discussed with patient, floor RN, sitter by the bedside, care team.  >35 minutes spent by me on the date of service doing chart review, history, exam, documentation & further activities per the note.        Vikash Wyatt MD        Interval History:        Patient lying in bed.  Denies any chest pain or palpitations.  Denies any shortness of breath.  Reports sore throat.  No hypoxia.  Denies any new tingling or numbness.  Denies any abdominal pain.  Denies any diarrhea.  Denies any headache or dizziness.  Does admit to having on and off suicidal ideation -  Having sitter at bedside.  Awaiting psychiatry evaluation.  Afebrile, tachycardia improving         Physical Exam:        Physical Exam   Temp:  [97.8  F (36.6  C)-99  F (37.2  C)] 97.8  F (36.6  C)  Pulse:  [61-85] 63  Resp:  [16-18] 16  BP: (107-140)/(75-89) 118/81  SpO2:  [92 %-98 %] 94 %    Intake/Output  Summary (Last 24 hours) at 2/4/2024 1515  Last data filed at 2/4/2024 0939  Gross per 24 hour   Intake 1580 ml   Output --   Net 1580 ml       Admission Weight: 140.6 kg (310 lb)  Current Weight: 140.6 kg (310 lb)    PHYSICAL EXAM  GENERAL: Patient is in no distress. Alert and oriented.  HEENT: Oropharynx pink  LUNGS:  Respirations unlabored.  No wheezing or crackles.  NEURO: Moving all extremities  EXTREMITIES: No pedal edema  SKIN: Warm, dry. No rash  PSYCHIATRY Cooperative       Medications:         enoxaparin ANTICOAGULANT  40 mg Subcutaneous Q12H    metFORMIN  500 mg Oral BID w/meals    nirmatrelvir and ritonavir  3 tablet Oral BID    OLANZapine  10 mg Oral At Bedtime    sodium chloride (PF)  3 mL Intracatheter Q8H    venlafaxine  225 mg Oral Daily     acetaminophen **OR** acetaminophen, ALPRAZolam, sore throat, benzonatate, bisacodyl, calcium carbonate, docusate sodium, guaiFENesin, lidocaine 4%, lidocaine (buffered or not buffered), Magic Mouthwash, ondansetron **OR** ondansetron, senna-docusate **OR** senna-docusate, sodium chloride (PF), zolpidem         Data:      All new lab and imaging data was reviewed.

## 2024-02-05 NOTE — PLAN OF CARE
Goal Outcome Evaluation:      2/4/2024 [id number]   Cognitive Concerns/ Orientation : Alert and oriented x 4, calm and cooperative   BEHAVIOR & AGGRESSION TOOL COLOR: Green  CIWA SCORE: NA   ABNL VS/O2: VSS on RA.   MOBILITY: Independent  PAIN MANAGMENT: Denies   BOWEL/BLADDER: Continent   ABNL LAB/BG: Crp 50.39  DRAIN/DEVICES: L PIV SL  TELEMETRY RHYTHM: NA  SKIN:  WNL  TESTS/PROCEDURES: none   D/C DAY/GOALS/PLACE: pending progress  OTHER IMPORTANT INFO: continues to have intermittent SI, suicidal precautions maintained, sitter at bed side,on 72 hr hold  started on 2/1 at 11:47 am. Psych following, plan to discharge to inpatient psych when medically stable. CC/SW consult placed.    Current condition (current mood & behavior): calm, cooperative and pleasant, flat  Sitter present: yes  Every 15 minute documentation by NA/RN completed for Shift: yes  Room safety Suicide Checklist completed in Epic: yes  Patient's color of severity (suicide scale): YELLOW  Order for psych consult placed (if appropriate): yes  Suicide care plan added: yes

## 2024-02-05 NOTE — PROGRESS NOTES
Current condition (current mood & behavior): flat affect calm pleasant and cooperative   Sitter present: yes  Every 15 minute documentation by NA/RN completed for Shift: yes  Room safety Suicide Checklist completed in Epic: yes  Patient's color of severity (suicide scale): YELLOW  Order for psych consult placed (if appropriate): yes  Suicide care plan added: yes      Destinee Arzola RN

## 2024-02-05 NOTE — PLAN OF CARE
Goal Outcome Evaluation: reviewed with patient   DATE & TIME: 2/5/2024 days     Cognitive Concerns/ Orientation :  alert and oriented x 4   BEHAVIOR & AGGRESSION TOOL COLOR:  green   CIWA SCORE:  na    ABNL VS/O2:  VSS RA   MOBILITY: up independently. Has 1:1 sitter at bedside   PAIN MANAGMENT:  medicated once for sore throat with relief.   DIET:  Regular ate well.   BOWEL/BLADDER:  continent of bladder and bowel   ABNL LAB/BG:   DRAIN/DEVICES:  SL   TELEMETRY RHYTHM:  NA   SKIN:  intact   TESTS/PROCEDURES:  none   D/C DATE:  uncertain   Discharge Barriers:  needs to have Inpatient psych bed. Needs to be cleared from COVID isolation   OTHER IMPORTANT INFO:  pt very flat and somewhat withdrawn, will answer questions, does state no intention for self harm. Still has some suicide ideation. VSS RA ate well. Complains of sore throat and medicated with tylenol and lozenge for pain, with relief.

## 2024-02-06 LAB
ATRIAL RATE - MUSE: 80 BPM
CREAT SERPL-MCNC: 0.85 MG/DL (ref 0.51–0.95)
CRP SERPL-MCNC: 23.07 MG/L
DIASTOLIC BLOOD PRESSURE - MUSE: NORMAL MMHG
EGFRCR SERPLBLD CKD-EPI 2021: 88 ML/MIN/1.73M2
INTERPRETATION ECG - MUSE: NORMAL
P AXIS - MUSE: 50 DEGREES
PLATELET # BLD AUTO: 254 10E3/UL (ref 150–450)
PR INTERVAL - MUSE: 144 MS
QRS DURATION - MUSE: 76 MS
QT - MUSE: 390 MS
QTC - MUSE: 449 MS
R AXIS - MUSE: 26 DEGREES
SYSTOLIC BLOOD PRESSURE - MUSE: NORMAL MMHG
T AXIS - MUSE: 28 DEGREES
VENTRICULAR RATE- MUSE: 80 BPM

## 2024-02-06 PROCEDURE — 86140 C-REACTIVE PROTEIN: CPT | Performed by: HOSPITALIST

## 2024-02-06 PROCEDURE — 36415 COLL VENOUS BLD VENIPUNCTURE: CPT | Performed by: PHYSICIAN ASSISTANT

## 2024-02-06 PROCEDURE — 99232 SBSQ HOSP IP/OBS MODERATE 35: CPT | Performed by: INTERNAL MEDICINE

## 2024-02-06 PROCEDURE — 250N000013 HC RX MED GY IP 250 OP 250 PS 637

## 2024-02-06 PROCEDURE — 120N000001 HC R&B MED SURG/OB

## 2024-02-06 PROCEDURE — 85049 AUTOMATED PLATELET COUNT: CPT | Performed by: PHYSICIAN ASSISTANT

## 2024-02-06 PROCEDURE — 250N000011 HC RX IP 250 OP 636: Performed by: PHYSICIAN ASSISTANT

## 2024-02-06 PROCEDURE — 250N000013 HC RX MED GY IP 250 OP 250 PS 637: Performed by: PSYCHIATRY & NEUROLOGY

## 2024-02-06 PROCEDURE — 82565 ASSAY OF CREATININE: CPT | Performed by: PHYSICIAN ASSISTANT

## 2024-02-06 RX ADMIN — ENOXAPARIN SODIUM 40 MG: 40 INJECTION SUBCUTANEOUS at 17:51

## 2024-02-06 RX ADMIN — METFORMIN HYDROCHLORIDE 500 MG: 500 TABLET ORAL at 17:51

## 2024-02-06 RX ADMIN — ENOXAPARIN SODIUM 40 MG: 40 INJECTION SUBCUTANEOUS at 06:07

## 2024-02-06 RX ADMIN — METFORMIN HYDROCHLORIDE 500 MG: 500 TABLET ORAL at 08:09

## 2024-02-06 RX ADMIN — VENLAFAXINE HYDROCHLORIDE 225 MG: 150 CAPSULE, EXTENDED RELEASE ORAL at 08:10

## 2024-02-06 RX ADMIN — OLANZAPINE 10 MG: 5 TABLET, FILM COATED ORAL at 21:31

## 2024-02-06 ASSESSMENT — ACTIVITIES OF DAILY LIVING (ADL)
ADLS_ACUITY_SCORE: 23
ADLS_ACUITY_SCORE: 24
ADLS_ACUITY_SCORE: 23

## 2024-02-06 NOTE — PROGRESS NOTES
Current condition (current mood & behavior): Pt is pleasant and alert   Sitter present: yes  Every 15 minute documentation by NA/RN completed for Shift: yes  Room safety Suicide Checklist completed in Epic: yes  Patient's color of severity (suicide scale): YELLOW  Order for psych consult placed (if appropriate): yes  Suicide care plan added: yes      Destinee Arzola RN

## 2024-02-06 NOTE — PROGRESS NOTES
Call from Khushi from  [facility] court, patient is now on a hold as of 1133 am today. Pt will receive paperwork today or tomorrow from the [facility].  Pt was notified by writer.

## 2024-02-06 NOTE — PROGRESS NOTES
[facility]    HOSPITALIST PROGRESS NOTE :   --------------------------------------------------    Date of Admission:  1/31/2024    Cumulative Summary:     Misty Parham is a 38 year old female with major depressive disorder who presented to the ED on 1/31 with suicidal ideation. She was placed on a 72 hour hold while in the ED on 2/1/24 11:47 am as symptoms were impacting her ability to safely and appropriately function in the community, and she is at high risk for death by suicide accidental or intentional due to mental health hx and substance use hx. Hospital staff have petitioned for MI civil commitment, awaiting court determination.    Subsequently complained of sore throat, tested positive for COVID-19 infection on 2/2/24 and thus cannot discharge to inpatient psych until she is off of special precautions per report.  Hence admitted to inpatient unit on 2/3/2024 under hospitalist service.      Assessment & Plan     COVID-19 infection - tested positive 2/2/2024  Complaint of sore throat, intermittent cough.  No chest pain or palpitations  Temperature 101F, heart rate 120s at the time of admission.    *WBC 10.1.  Hemoglobin 15.3.  Creatinine 0.90.  Liver enzymes within normal limits.  CK 23, CRP 38.18.  Procalcitonin 0.18.  hCG qualitative negative.  D-dimer 0.44.  Troponin less than 6.  Chest x-ray no acute pathology.    -- Patient care was assumed this morning , seen and examined   --Continue Paxlovid [2/3]  --Continue Lovenox twice daily for DVT prophylaxis.  --Followed CRP, downward trend   Monitor O2 sats, will consider steroids if hypoxic.  --Aggressive incentive spirometry.  Encourage ambulation.  --As needed antitussives.  Cepacol lozenges.  Magic mouthwash ordered.      Tachycardia from fever, infection improved  -- Per report patient not drinking much fluids due to sore throat, intermittent cough.   --UA on admission with trace ketones.  Nitrite negative, leukocyte esterase  "negative.  EKG sinus rhythm, heart rate 80, QTc 449  Recent TSH from December within normal limits.  -- Started on Paxlovid, fever improved.  Received IV fluids.  Tachycardia improved.     Suicidal ideation  Borderline personality disorder  Severe episode of recurrent major depressive disorder without psychotic features  *Patient was placed on a 72 hour hold 2/1/24 at 11:47 am  --Psychiatry consult requested.  Await input.  --Continue Effexor, which was increased to 225 mg daily on 2/1.  QTc within normal limits.  --Continue Zyprexa 10 mg HS  -Continue Metformin 500 mg BID, started on 2/1, to help minimize metabolic side effects related to Zyprexa  --Suicide precautions, 1:1 sitter  --Elopement precautions  --SW/CC consult.  Supportive care.  -- Received call from Tatiana from [facility], patient is now on hold as of 11:33 AM today.  Patient will receive paperwork either today or tomorrow from the [facility].  Patient is aware.     Medically complicated obesity with a BMI of 51.59.  Increase all-cause mortality and morbidity.    consider lifestyle modification with diet and exercise as able to.     Possible JEAN PIERRE/OHS.  Sleep studies as outpatient recommended     Orders Placed This Encounter      Regular Diet Adult      DVT Prophylaxis: SCDs, subcutaneous Lovenox twice daily.  Code Status: Full Code  Disposition: Expected discharge pending inpatient psychiatric bed availability, psych input, safe discharge plan in place      Clinically Significant Risk Factors                         # Severe Obesity: Estimated body mass index is 51.59 kg/m  as calculated from the following:    Height as of this encounter: 1.651 m (5' 5\").    Weight as of this encounter: 140.6 kg (310 lb)., PRESENT ON ADMISSION     # Financial/Environmental Concerns: none         Diet: Regular Diet Adult    Allen Catheter: Not present  DVT Prophylaxis: Enoxaparin (Lovenox) SQ  Code Status: Full Code    The " patient's care was discussed with the Bedside Nurse and Patient.    Medical Decision Making       38 MINUTES SPENT BY ME on the date of service doing chart review, history, exam, documentation & further activities per the note.      Disposition Plan     Expected Discharge Date: 02/16/2024        Discharge Comments: 10 days iso from admit, then Psych.  Petition for commitment.  On a 72 hour hold as of 2/1/24.     Entered: Bonnie Dumont MD 02/06/2024, 9:21 AM       Bonnie Dumont MD, MD, FACP  Text Page (7am - 6pm)    ----------------------------------------------------------------------------------------------------------------------      Interval History   Patient care was assumed this morning, patient was seen and examined, sitting in bed, denying any suicidal ideation at this point but told me that she is not completely normal and intermittently she will have some passive thoughts of suicide.  Patient is pleasant and cooperative discussed with her regarding recommendations by psychiatry for further inpatient management.    -Data reviewed today: I reviewed all new labs and imaging results over the last 24 hours.    I personally reviewed no images or EKG's today.    Physical Exam   Temp: 97.9  F (36.6  C) Temp src: Oral BP: 134/84 Pulse: 77   Resp: 17 SpO2: 97 % O2 Device: None (Room air)    Vitals:    01/31/24 1321   Weight: 140.6 kg (310 lb)     Vital Signs with Ranges  Temp:  [97.6  F (36.4  C)-98.5  F (36.9  C)] 97.9  F (36.6  C)  Pulse:  [65-82] 77  Resp:  [16-17] 17  BP: (105-134)/(61-84) 134/84  SpO2:  [94 %-98 %] 97 %  I/O last 3 completed shifts:  In: 360 [P.O.:360]  Out: -     GENERAL: Alert , awake and oriented. NAD. Conversational, appropriate.   HEENT: Normocephalic. EOMI. No icterus or injection. Nares normal.   LUNGS: Clear to auscultation. No dyspnea at rest.   HEART: Regular rate. Extremities perfused.   ABDOMEN: Soft, nontender, and nondistended. Positive bowel sounds.   EXTREMITIES: No LE edema  noted.   NEUROLOGIC: Moves extremities x4 on command. No acute focal neurologic abnormalities noted.     Medications      enoxaparin ANTICOAGULANT  40 mg Subcutaneous Q12H    metFORMIN  500 mg Oral BID w/meals    nirmatrelvir and ritonavir  3 tablet Oral BID    OLANZapine  10 mg Oral At Bedtime    sodium chloride (PF)  3 mL Intracatheter Q8H    venlafaxine  225 mg Oral Daily       Data   Recent Labs   Lab 02/04/24  2326 02/03/24  1413   WBC 6.5 10.1   HGB 13.4 15.3   MCV 90 92    272    138   POTASSIUM 3.6 3.8   CHLORIDE 104 98   CO2 26 27   BUN 6.9 10.2   CR 0.74 0.90   ANIONGAP 9 13   CRISTINE 9.1 9.8   * 89   ALBUMIN 3.5 4.3   PROTTOTAL 6.3* 7.6   BILITOTAL 0.2 0.3   ALKPHOS 62 85   ALT 24 39   AST 25 43       Imaging:   No results found for this or any previous visit (from the past 24 hour(s)).

## 2024-02-06 NOTE — PLAN OF CARE
Goal Outcome Evaluation:       Summary:  admitted from ED w/ suicidal ideations and covid +  DATE & TIME: 02/05/24 0571-3130  Cognitive Concerns/ Orientation : A&Ox4, calm and cooperative. Flat affect. Reports intermittent SI with a plan the other shift but denied any this shift. Dependent on emotional states.   BEHAVIOR & AGGRESSION TOOL COLOR: Green, sitter at bedside for safety.   CIWA SCORE: NA   ABNL VS/O2: VSS on RA.   MOBILITY: Independent in room.   PAIN MANAGMENT: Denies   BOWEL/BLADDER: Continent, using bathroom. Good appetite and fluid intake.   ABNL LAB/BG: Crp 50.39. Recheck in AM.   DRAIN/DEVICES: L PIV SL, WDL   TELEMETRY RHYTHM: NA  SKIN:  WNL  TESTS/PROCEDURES: AM labs   D/C DAY/GOALS/PLACE: Pending progress  OTHER IMPORTANT INFO: Suicidal precautions maintained, sitter at bed side, on 72 hr hold started on 2/3 at 1530. Psych following, plan to discharge to inpatient psych when medically stable. CC/SW following.

## 2024-02-06 NOTE — PLAN OF CARE
Goal Outcome Evaluation: reviewed with patient   DATE & TIME: 2/6/2024  days     Cognitive Concerns/ Orientation :  alert and oriented x 4   BEHAVIOR & AGGRESSION TOOL COLOR:  green   CIWA SCORE:  na    ABNL VS/O2:  VSS RA   MOBILITY:  up independently activity supervised by 1:1 sitter   PAIN MANAGMENT:  denies   DIET:  Regular ate well   BOWEL/BLADDER:  continent of bowel and bladder   ABNL LAB/BG:    DRAIN/DEVICES:  SL removed   TELEMETRY RHYTHM:  na   SKIN:  intact   TESTS/PROCEDURES:  none   D/C DATE:  uncertain   Discharge Barriers:  pt placed on mental health court hold today   OTHER IMPORTANT INFO:  Pt was placed on a mental health court hold today at 1133 am. Pt is cooperative. When asked about a suicide plan she states she doesn't share her plans, she states she doesn't have a plan right now. Pt states her throat is feeling better today.

## 2024-02-06 NOTE — PROGRESS NOTES
".Current condition (current mood & behavior): Calm, cooperative, flat affect. Stated \"feeling a little better.\" Still reports intermittent SI dependent on emotional state. Reported she \"had a plan\", but did not go into details. Pt was unsure if would divulge ill feelings to harm herself with staff.    Sitter present: yes  Every 15 minute documentation by NA/RN completed for Shift: yes, small gap of missed charting, but sitter or RN present with continuous observation.    Room safety Suicide Checklist completed in Epic: yes  Patient's color of severity (suicide scale): ORANGE  Order for psych consult placed (if appropriate): yes  Suicide care plan added: yes      Arnaud Webber RN      "

## 2024-02-06 NOTE — PLAN OF CARE
Summary:  admitted from ED w/ suicidal ideations and covid +  DATE & TIME: 02/05/24 Evenings    Cognitive Concerns/ Orientation : A&Ox4, calm and cooperative. Flat affect with mild anxiety. Reports intermittent SI with a plan. Dependent on emotional states.   BEHAVIOR & AGGRESSION TOOL COLOR: Green, sitter at bedside for safety.   CIWA SCORE: NA   ABNL VS/O2: VSS on RA.   MOBILITY: Independent in room.   PAIN MANAGMENT: Denies   BOWEL/BLADDER: Continent, using bathroom. Good appetite and fluid intake.   ABNL LAB/BG: Crp 50.39. Recheck in AM.   DRAIN/DEVICES: L PIV SL, WDL   TELEMETRY RHYTHM: NA  SKIN:  WNL  TESTS/PROCEDURES: AM labs   D/C DAY/GOALS/PLACE: Pending progress  OTHER IMPORTANT INFO: Suicidal precautions maintained, sitter at bed side, on 72 hr hold started on 2/3 at 1530. Psych following, plan to discharge to inpatient psych when medically stable. CC/SW following.

## 2024-02-07 PROCEDURE — 250N000013 HC RX MED GY IP 250 OP 250 PS 637: Performed by: PHYSICIAN ASSISTANT

## 2024-02-07 PROCEDURE — 99232 SBSQ HOSP IP/OBS MODERATE 35: CPT | Performed by: INTERNAL MEDICINE

## 2024-02-07 PROCEDURE — 250N000013 HC RX MED GY IP 250 OP 250 PS 637

## 2024-02-07 PROCEDURE — 120N000001 HC R&B MED SURG/OB

## 2024-02-07 PROCEDURE — 250N000013 HC RX MED GY IP 250 OP 250 PS 637: Performed by: INTERNAL MEDICINE

## 2024-02-07 PROCEDURE — 250N000011 HC RX IP 250 OP 636: Performed by: PHYSICIAN ASSISTANT

## 2024-02-07 PROCEDURE — 250N000013 HC RX MED GY IP 250 OP 250 PS 637: Performed by: PSYCHIATRY & NEUROLOGY

## 2024-02-07 RX ORDER — TRAZODONE HYDROCHLORIDE 50 MG/1
50 TABLET, FILM COATED ORAL AT BEDTIME
Status: DISCONTINUED | OUTPATIENT
Start: 2024-02-07 | End: 2024-02-07

## 2024-02-07 RX ORDER — ZOLPIDEM TARTRATE 5 MG/1
10 TABLET ORAL
Status: DISCONTINUED | OUTPATIENT
Start: 2024-02-07 | End: 2024-02-15 | Stop reason: HOSPADM

## 2024-02-07 RX ADMIN — ACETAMINOPHEN 650 MG: 325 TABLET, FILM COATED ORAL at 17:29

## 2024-02-07 RX ADMIN — ZOLPIDEM TARTRATE 10 MG: 5 TABLET ORAL at 20:28

## 2024-02-07 RX ADMIN — VENLAFAXINE HYDROCHLORIDE 225 MG: 150 CAPSULE, EXTENDED RELEASE ORAL at 08:54

## 2024-02-07 RX ADMIN — ENOXAPARIN SODIUM 40 MG: 40 INJECTION SUBCUTANEOUS at 05:01

## 2024-02-07 RX ADMIN — ENOXAPARIN SODIUM 40 MG: 40 INJECTION SUBCUTANEOUS at 17:24

## 2024-02-07 RX ADMIN — OLANZAPINE 10 MG: 5 TABLET, FILM COATED ORAL at 20:23

## 2024-02-07 RX ADMIN — METFORMIN HYDROCHLORIDE 500 MG: 500 TABLET ORAL at 17:24

## 2024-02-07 RX ADMIN — METFORMIN HYDROCHLORIDE 500 MG: 500 TABLET ORAL at 08:54

## 2024-02-07 ASSESSMENT — ACTIVITIES OF DAILY LIVING (ADL)
ADLS_ACUITY_SCORE: 24
ADLS_ACUITY_SCORE: 24
ADLS_ACUITY_SCORE: 23
ADLS_ACUITY_SCORE: 23
ADLS_ACUITY_SCORE: 24
ADLS_ACUITY_SCORE: 23
ADLS_ACUITY_SCORE: 23
ADLS_ACUITY_SCORE: 24
ADLS_ACUITY_SCORE: 24
ADLS_ACUITY_SCORE: 23

## 2024-02-07 NOTE — PLAN OF CARE
Summary:  admitted from ED w/ suicidal ideations and covid +  DATE & TIME: 02/06-02/07 overnight  Cognitive Concerns/ Orientation : A&Ox4, calm and cooperative. Slept on/off over night. Reports intermittent SI with a plan the other shift but denied any this shift.  BEHAVIOR & AGGRESSION TOOL COLOR: Green, sitter at bedside for safety.   ABNL VS/O2: VSS on RA. Infrequent cough.  MOBILITY: Independent in room.   PAIN MANAGMENT: Denies. No PRNs given.  BOWEL/BLADDER: Continent, using bathroom. Good appetite and fluid intake.   ABNL LAB/BG: Crp 23.07- improving. Recheck in AM.   DRAIN/DEVICES: No PIV- MD aware  SKIN:  WNL  TESTS/PROCEDURES: AM labs   D/C DAY/GOALS/PLACE: Pending covid recovered status, and acceptance to inpatient mental health  OTHER IMPORTANT INFO: Suicidal precautions maintained, sitter at bed side, on 72 hr hold started on 2/3 at 1530. Psych following, plan to discharge to inpatient psych when medically stable. CC/SW following.

## 2024-02-07 NOTE — PLAN OF CARE
Goal Outcome Evaluation:    Has 1:1 sitter      Orientation: A+Ox3    Vitals/Tele: VSS on room air     IV Access/drains: No IV access     Diet: regular    Mobility: Indp in room    GI/: Continent of B+B    Wound/Skin: WDL    Discharge Plan: Pending inpt psych after covid cleared      See Flow sheets for assessment

## 2024-02-07 NOTE — PROGRESS NOTES
Current condition (current mood & behavior): Pt is pleasant and alert and oriented   Sitter present: yes  Every 15 minute documentation by NA/RN completed for Shift: yes  Room safety Suicide Checklist completed in Epic: yes  Patient's color of severity (suicide scale): YELLOW  Order for psych consult placed (if appropriate): yes  Suicide care plan added: yes

## 2024-02-07 NOTE — PLAN OF CARE
Summary:  admitted from ED w/ suicidal ideations and covid +  DATE & TIME: 02/06/24 [id number]  Cognitive Concerns/ Orientation : A&Ox4, calm and cooperative. Affect improved from previous nurse reports- smiled and was engaging this shift. Reports intermittent SI with a plan the other shift but denied any this shift.  BEHAVIOR & AGGRESSION TOOL COLOR: Green, sitter at bedside for safety.   ABNL VS/O2: VSS on RA.   MOBILITY: Independent in room.   PAIN MANAGMENT: Denies   BOWEL/BLADDER: Continent, using bathroom. Good appetite and fluid intake.   ABNL LAB/BG: Crp 23.07- improving. Recheck in AM.   DRAIN/DEVICES: No PIV- MD aware  TELEMETRY RHYTHM: NA  SKIN:  WNL  TESTS/PROCEDURES: AM labs   D/C DAY/GOALS/PLACE: Pending covid recovered status, and acceptance to inpatient mental health  OTHER IMPORTANT INFO: Suicidal precautions maintained, sitter at bed side, on 72 hr hold started on 2/3 at 1530. Psych following, plan to discharge to inpatient psych when medically stable. CC/SW following.

## 2024-02-07 NOTE — PROGRESS NOTES
[facility]    HOSPITALIST PROGRESS NOTE :   --------------------------------------------------    Date of Admission:  1/31/2024    Cumulative Summary:     Misty Parham is a 38 year old female with major depressive disorder who presented to the ED on 1/31 with suicidal ideation. She was placed on a 72 hour hold while in the ED on 2/1/24 11:47 am as symptoms were impacting her ability to safely and appropriately function in the community, and she is at high risk for death by suicide accidental or intentional due to mental health hx and substance use hx. Hospital staff have petitioned for MI civil commitment, awaiting court determination.    Subsequently complained of sore throat, tested positive for COVID-19 infection on 2/2/24 and thus cannot discharge to inpatient psych until she is off of special precautions per report. Hence admitted to inpatient unit on 2/3/2024 under hospitalist service.      Assessment & Plan     COVID-19 infection - tested positive 2/2/2024  Complaint of sore throat, intermittent cough.  No chest pain or palpitations  Temperature 101F, heart rate 120s at the time of admission.    *WBC 10.1.  Hemoglobin 15.3.  Creatinine 0.90.  Liver enzymes within normal limits.  CK 23, CRP 38.18.  Procalcitonin 0.18.  hCG qualitative negative.  D-dimer 0.44.  Troponin less than 6.  Chest x-ray no acute pathology.    -- Patient care was assumed this morning , feeling better   --Continue Paxlovid [2/3]  --Continue Lovenox twice daily for DVT prophylaxis.  --Followed CRP, downward trend     -- O2 sats are stable , no need for steroids   -- Aggressive incentive spirometry.    -- Encourage ambulation.  -- As needed antitussives.  Cepacol lozenges.    -- Magic mouthwash ordered.      Tachycardia from fever, infection; resolved     -- Per report patient not drinking much fluids due to sore throat, intermittent cough.   -- UA on admission with trace ketones.  Nitrite negative, leukocyte  esterase negative.  --EKG sinus rhythm, heart rate 80, QTc 449  -- Recent TSH from December within normal limits.  -- Started on Paxlovid, fever improved.    -- Received IV fluids. Tachycardia improved.     Suicidal ideation  Borderline personality disorder  Severe episode of recurrent major depressive disorder without psychotic features  *Patient was placed on a 72 hour hold 2/1/24 at 11:47 am  *patient is placed on court hold on 02/06/24    --Psychiatry consult requested during this stay , appreciate their help   -- Continue Effexor, which was increased to 225 mg daily on 2/1.  QTc within normal limits.  -- Continue Zyprexa 10 mg HS  -- Continue Metformin 500 mg BID, started on 2/1, to help minimize metabolic side effects related to Zyprexa  -- patient is not sleeping very well, will add PTA 50 mg Trazadone at bedtime , will also order Ambien 10 mg at bedtime PRN , PRN 5 mg is not helping , patient has been taking ambien for long period of time   -- Her PTA TID scheduled Lorazepam has not been continued during this stay , PRN Lorazepam is available, patient is doing well without Lorazepam at this time and is aware the PRN Lorazepam is available   -- Suicide precautions, 1:1 sitter  -- Elopement precautions  -- SW/CC consult.  Supportive care.  -- SW received call from Tatiana from [facility] court, patient is now on court hold as of 11:33 AM on 06/06/24   Patient will receive paperwork either today or tomorrow from the [facility].  Patient is aware.  -- I did discuss with patient regarding court orders, showed understanding , agreed that she does have intermittent suicidal thought      Addendum; discussed with pharmacy due to interaction between trazodone and paxlovid , plan to start trazodone 3 days after paxlovid is completed      Medically complicated obesity with a BMI of 51.59.  Increase all-cause mortality and morbidity.    --consider lifestyle modification with diet and  "exercise as able to.     Possible JEAN PIERRE/OHS.  --Sleep studies as outpatient recommended    Clinically Significant Risk Factors                         # Severe Obesity: Estimated body mass index is 51.59 kg/m  as calculated from the following:    Height as of this encounter: 1.651 m (5' 5\").    Weight as of this encounter: 140.6 kg (310 lb)., PRESENT ON ADMISSION       # Financial/Environmental Concerns: none         Diet: Regular Diet Adult    Allen Catheter: Not present  DVT Prophylaxis: Enoxaparin (Lovenox) SQ  Code Status: Full Code    The patient's care was discussed with the Bedside Nurse and Patient.    Medical Decision Making     40 MINUTES SPENT BY ME on the date of service doing chart review, history, exam, documentation & further activities per the note.      Disposition Plan     Expected Discharge Date: 02/16/2024        Discharge Comments: 10 days iso from admit, then Psych.  Petition for commitment.  On a 72 hour hold as of 2/1/24.     Entered: Bonnie Dumont MD 02/07/2024, 8:21 AM       Bonnie Dumont MD, MD, FACP  Text Page (7am - 6pm)    ----------------------------------------------------------------------------------------------------------------------      Interval History     Patient was seen and examined , feeling better but not sleeping well , no suicidal ideations at this point but does think that thought come and go , discussed with her regarding court orders  Reviewed her home medications with her , we discussed about starting trazodone and increasing her Ambien to home dose     -Data reviewed today: I reviewed all new labs and imaging results over the last 24 hours.    I personally reviewed no images or EKG's today.    Physical Exam   Temp: 98  F (36.7  C) Temp src: Oral BP: 116/79 Pulse: 65   Resp: 18 SpO2: 96 % O2 Device: None (Room air)    Vitals:    01/31/24 1321   Weight: 140.6 kg (310 lb)     Vital Signs with Ranges  Temp:  [97.7  F (36.5  C)-98.5  F (36.9  C)] 98  F (36.7  C)  Pulse:  " [63-73] 65  Resp:  [16-18] 18  BP: (109-145)/(75-96) 116/79  SpO2:  [92 %-99 %] 96 %  I/O last 3 completed shifts:  In: 480 [P.O.:480]  Out: -     GENERAL: Alert , awake and oriented. NAD. Conversational, appropriate.   HEENT: Normocephalic. EOMI. No icterus or injection. Nares normal.   LUNGS: Clear to auscultation. No dyspnea at rest.   HEART: Regular rate. Extremities perfused.   ABDOMEN: Soft, nontender, and nondistended. Positive bowel sounds.   EXTREMITIES: No LE edema noted.   NEUROLOGIC: Moves extremities x4 on command. No acute focal neurologic abnormalities noted.     Medications      enoxaparin ANTICOAGULANT  40 mg Subcutaneous Q12H    metFORMIN  500 mg Oral BID w/meals    nirmatrelvir and ritonavir  3 tablet Oral BID    OLANZapine  10 mg Oral At Bedtime    sodium chloride (PF)  3 mL Intracatheter Q8H    venlafaxine  225 mg Oral Daily       Data   Recent Labs   Lab 02/06/24  1000 02/04/24  2326 02/03/24  1413   WBC  --  6.5 10.1   HGB  --  13.4 15.3   MCV  --  90 92    229 272   NA  --  139 138   POTASSIUM  --  3.6 3.8   CHLORIDE  --  104 98   CO2  --  26 27   BUN  --  6.9 10.2   CR 0.85 0.74 0.90   ANIONGAP  --  9 13   CRISTINE  --  9.1 9.8   GLC  --  102* 89   ALBUMIN  --  3.5 4.3   PROTTOTAL  --  6.3* 7.6   BILITOTAL  --  0.2 0.3   ALKPHOS  --  62 85   ALT  --  24 39   AST  --  25 43       Imaging:   No results found for this or any previous visit (from the past 24 hour(s)).

## 2024-02-08 PROCEDURE — 250N000011 HC RX IP 250 OP 636: Performed by: PHYSICIAN ASSISTANT

## 2024-02-08 PROCEDURE — 99232 SBSQ HOSP IP/OBS MODERATE 35: CPT | Performed by: INTERNAL MEDICINE

## 2024-02-08 PROCEDURE — 250N000013 HC RX MED GY IP 250 OP 250 PS 637: Performed by: PSYCHIATRY & NEUROLOGY

## 2024-02-08 PROCEDURE — 250N000013 HC RX MED GY IP 250 OP 250 PS 637

## 2024-02-08 PROCEDURE — 120N000001 HC R&B MED SURG/OB

## 2024-02-08 PROCEDURE — 250N000013 HC RX MED GY IP 250 OP 250 PS 637: Performed by: INTERNAL MEDICINE

## 2024-02-08 PROCEDURE — 250N000013 HC RX MED GY IP 250 OP 250 PS 637: Performed by: PHYSICIAN ASSISTANT

## 2024-02-08 RX ADMIN — ACETAMINOPHEN 650 MG: 325 TABLET, FILM COATED ORAL at 05:06

## 2024-02-08 RX ADMIN — ENOXAPARIN SODIUM 40 MG: 40 INJECTION SUBCUTANEOUS at 05:02

## 2024-02-08 RX ADMIN — METFORMIN HYDROCHLORIDE 500 MG: 500 TABLET ORAL at 08:43

## 2024-02-08 RX ADMIN — METFORMIN HYDROCHLORIDE 500 MG: 500 TABLET ORAL at 18:06

## 2024-02-08 RX ADMIN — ZOLPIDEM TARTRATE 10 MG: 5 TABLET ORAL at 23:30

## 2024-02-08 RX ADMIN — VENLAFAXINE HYDROCHLORIDE 225 MG: 150 CAPSULE, EXTENDED RELEASE ORAL at 08:43

## 2024-02-08 RX ADMIN — ENOXAPARIN SODIUM 40 MG: 40 INJECTION SUBCUTANEOUS at 18:06

## 2024-02-08 RX ADMIN — OLANZAPINE 10 MG: 5 TABLET, FILM COATED ORAL at 21:44

## 2024-02-08 ASSESSMENT — ACTIVITIES OF DAILY LIVING (ADL)
ADLS_ACUITY_SCORE: 23

## 2024-02-08 NOTE — PROGRESS NOTES
Summary:  admitted from ED w/ suicidal ideations and covid +  DATE & TIME: 02/06-02/07 overnight  Cognitive Concerns/ Orientation : A&Ox4, calm and cooperative. Slept on/off over night. Reports intermittent SI that comes and goes.  BEHAVIOR & AGGRESSION TOOL COLOR: Green, sitter at bedside for safety.   ABNL VS/O2: VSS on RA.   MOBILITY: Independent in room.   PAIN MANAGMENT: Denies   BOWEL/BLADDER: Continent, using bathroom. Good appetite and fluid intake.   ABNL LAB/BG: Crp 23.07- improving. Recheck in AM.   DRAIN/DEVICES: No PIV- MD aware  TELEMETRY RHYTHM: NA  SKIN:  WNL  TESTS/PROCEDURES: AM labs   D/C DAY/GOALS/PLACE: Pending covid recovered status, and acceptance to inpatient mental health  OTHER IMPORTANT INFO: Suicidal precautions maintained, sitter at bed side, on 72 hr hold started on 2/3 at 1530. Psych following, plan to discharge to inpatient psych when medically stable. CC/SW following.

## 2024-02-08 NOTE — PROGRESS NOTES
[facility]    HOSPITALIST PROGRESS NOTE :   --------------------------------------------------    Date of Admission:  1/31/2024    Cumulative Summary:     Misty Parham is a 38 year old female with major depressive disorder who presented to the ED on 1/31 with suicidal ideation. She was placed on a 72 hour hold while in the ED on 2/1/24 11:47 am as symptoms were impacting her ability to safely and appropriately function in the community, and she is at high risk for death by suicide accidental or intentional due to mental health hx and substance use hx. Hospital staff have petitioned for MI civil commitment, awaiting court determination.    Subsequently complained of sore throat, tested positive for COVID-19 infection on 2/2/24 and thus cannot discharge to inpatient psych until she is off of special precautions per report. Hence admitted to inpatient unit on 2/3/2024 under hospitalist service.      Assessment & Plan     COVID-19 infection - tested positive 2/2/2024  Complaint of sore throat, intermittent cough.  No chest pain or palpitations  Temperature 101F, heart rate 120s at the time of admission.    *WBC 10.1.  Hemoglobin 15.3.  Creatinine 0.90.  Liver enzymes within normal limits.  CK 23, CRP 38.18.  Procalcitonin 0.18.  hCG qualitative negative.  D-dimer 0.44.  Troponin less than 6.  Chest x-ray showed no acute pathology.    -- Patient has been admitted for further evaluation and management   -- Continue Paxlovid , will be done today 02/08/24  -- Continue Lovenox twice daily for DVT prophylaxis.  -- Followed CRP, downward trend     -- O2 sats are stable , no need for steroids   -- Aggressive incentive spirometry.    -- Encourage ambulation.  -- As needed antitussives.  Cepacol lozenges.    -- Magic mouthwash ordered.   -- Unable to be discharged to inpatient psych till ten day isolation is completed      Tachycardia from fever, infection; resolved     -- Per report patient not  drinking much fluids due to sore throat, intermittent cough.   -- UA on admission with trace ketones.  Nitrite negative, leukocyte esterase negative.  --  EKG sinus rhythm, heart rate 80, QTc 449  -- Recent TSH from December within normal limits.  -- Started on Paxlovid, fever improved.    -- Received IV fluids. Tachycardia improved.     Suicidal ideation  Borderline personality disorder  Severe episode of recurrent major depressive disorder without psychotic features  *Patient was placed on a 72 hour hold 2/1/24 at 11:47 am  *patient is placed on court hold on 02/06/24    -- Psychiatry consult requested during this stay , appreciate their help   -- Continue Effexor, dose increased to 225 mg daily on 2/1.  QTc within normal limits.  -- Continue Zyprexa 10 mg HS  -- Continue Metformin 500 mg BID, started on 2/1, to help minimize metabolic side effects related to Zyprexa  -- patient is not sleeping very well, PTA medications include 50 mg Trazadone at bedtime and Ambien 10 mg at bedtime PRN , patient has been taking ambien for long period of time   -- Trazodone will be held 3 days after Paxlovid is completed , probably can be started on 02/12/24  -- Her PTA TID scheduled Lorazepam has not been continued during this stay , PRN Lorazepam is available, patient is doing well without Lorazepam at this time and is aware the PRN Lorazepam is available   -- Suicide precautions, 1:1 sitter  -- Elopement precautions  -- SW/CC consult.  Supportive care.  -- SW received call from Tatiana from [facility] court, patient is now on court hold as of 11:33 AM on 06/06/24     -- I did discuss with patient regarding court orders, showed understanding , agreed that she does have intermittent suicidal thought       Medically complicated obesity with a BMI of 51.59.  Increase all-cause mortality and morbidity.    --consider lifestyle modification with diet and exercise as able to.     Possible JEAN PIERRE/OHS.  --Sleep studies as  "outpatient recommended    Clinically Significant Risk Factors                         # Severe Obesity: Estimated body mass index is 51.59 kg/m  as calculated from the following:    Height as of this encounter: 1.651 m (5' 5\").    Weight as of this encounter: 140.6 kg (310 lb).        # Financial/Environmental Concerns: none         Diet: Regular Diet Adult    Allen Catheter: Not present  DVT Prophylaxis: Enoxaparin (Lovenox) SQ  Code Status: Full Code    The patient's care was discussed with the Bedside Nurse and Patient.    Medical Decision Making     40 MINUTES SPENT BY ME on the date of service doing chart review, history, exam, documentation & further activities per the note.      Disposition Plan     Expected Discharge Date: 02/08/2024        Discharge Comments: 10 days iso from admit, then Psych.  Petition for commitment.  On a 72 hour hold as of 2/1/24.     Entered: Bonnie Dumont MD 02/08/2024, 7:22 AM       Bonnie Dumont MD, MD, FACP  Text Page (7am - 6pm)    ----------------------------------------------------------------------------------------------------------------------      Interval History     Patient was seen and examined, denying any new complaints.  Patient was not able to receive last dose of Paxlovid.  Patient can be started back on trazodone 3 days after patient has done with Paxlovid.    -Data reviewed today: I reviewed all new labs and imaging results over the last 24 hours.    I personally reviewed no images or EKG's today.    Physical Exam   Temp: 97.6  F (36.4  C) Temp src: Oral BP: (!) 152/96 Pulse: 65   Resp: 18 SpO2: 98 % O2 Device: None (Room air)    Vitals:    01/31/24 1321   Weight: 140.6 kg (310 lb)     Vital Signs with Ranges  Temp:  [97.6  F (36.4  C)-98.7  F (37.1  C)] 97.6  F (36.4  C)  Pulse:  [62-69] 65  Resp:  [18] 18  BP: (116-152)/(68-96) 152/96  SpO2:  [95 %-100 %] 98 %  I/O last 3 completed shifts:  In: 240 [P.O.:240]  Out: -     GENERAL: Alert , awake and oriented. NAD. " Conversational, appropriate.   HEENT: Normocephalic. EOMI. No icterus or injection. Nares normal.   LUNGS: Clear to auscultation. No dyspnea at rest.   HEART: Regular rate. Extremities perfused.   ABDOMEN: Soft, nontender, and nondistended. Positive bowel sounds.   EXTREMITIES: No LE edema noted.   NEUROLOGIC: Moves extremities x4 on command. No acute focal neurologic abnormalities noted.     Medications      enoxaparin ANTICOAGULANT  40 mg Subcutaneous Q12H    metFORMIN  500 mg Oral BID w/meals    nirmatrelvir and ritonavir  3 tablet Oral BID    OLANZapine  10 mg Oral At Bedtime    sodium chloride (PF)  3 mL Intracatheter Q8H    venlafaxine  225 mg Oral Daily       Data   Recent Labs   Lab 02/06/24  1000 02/04/24  2326 02/03/24  1413   WBC  --  6.5 10.1   HGB  --  13.4 15.3   MCV  --  90 92    229 272   NA  --  139 138   POTASSIUM  --  3.6 3.8   CHLORIDE  --  104 98   CO2  --  26 27   BUN  --  6.9 10.2   CR 0.85 0.74 0.90   ANIONGAP  --  9 13   CRISTINE  --  9.1 9.8   GLC  --  102* 89   ALBUMIN  --  3.5 4.3   PROTTOTAL  --  6.3* 7.6   BILITOTAL  --  0.2 0.3   ALKPHOS  --  62 85   ALT  --  24 39   AST  --  25 43       Imaging:   No results found for this or any previous visit (from the past 24 hour(s)).

## 2024-02-08 NOTE — PROVIDER NOTIFICATION
Paxlovid last dose was not able to give medicine not found in the bin. Pharmacy not able to sent one dose, per pharmacist .MD paged

## 2024-02-09 LAB
CREAT SERPL-MCNC: 0.73 MG/DL (ref 0.51–0.95)
EGFRCR SERPLBLD CKD-EPI 2021: >90 ML/MIN/1.73M2
PLATELET # BLD AUTO: 318 10E3/UL (ref 150–450)

## 2024-02-09 PROCEDURE — 250N000013 HC RX MED GY IP 250 OP 250 PS 637

## 2024-02-09 PROCEDURE — 36415 COLL VENOUS BLD VENIPUNCTURE: CPT | Performed by: PHYSICIAN ASSISTANT

## 2024-02-09 PROCEDURE — 250N000011 HC RX IP 250 OP 636: Performed by: PHYSICIAN ASSISTANT

## 2024-02-09 PROCEDURE — 250N000013 HC RX MED GY IP 250 OP 250 PS 637: Performed by: PSYCHIATRY & NEUROLOGY

## 2024-02-09 PROCEDURE — 85049 AUTOMATED PLATELET COUNT: CPT | Performed by: PHYSICIAN ASSISTANT

## 2024-02-09 PROCEDURE — 99232 SBSQ HOSP IP/OBS MODERATE 35: CPT | Performed by: INTERNAL MEDICINE

## 2024-02-09 PROCEDURE — 120N000001 HC R&B MED SURG/OB

## 2024-02-09 PROCEDURE — 99233 SBSQ HOSP IP/OBS HIGH 50: CPT | Performed by: PSYCHIATRY & NEUROLOGY

## 2024-02-09 PROCEDURE — 82565 ASSAY OF CREATININE: CPT | Performed by: PHYSICIAN ASSISTANT

## 2024-02-09 RX ORDER — ALPRAZOLAM 0.5 MG
1 TABLET ORAL 3 TIMES DAILY
Status: DISCONTINUED | OUTPATIENT
Start: 2024-02-09 | End: 2024-02-14

## 2024-02-09 RX ORDER — VENLAFAXINE HYDROCHLORIDE 150 MG/1
300 CAPSULE, EXTENDED RELEASE ORAL
Status: DISCONTINUED | OUTPATIENT
Start: 2024-02-10 | End: 2024-02-14

## 2024-02-09 RX ADMIN — METFORMIN HYDROCHLORIDE 500 MG: 500 TABLET ORAL at 08:52

## 2024-02-09 RX ADMIN — ALPRAZOLAM 1 MG: 0.5 TABLET ORAL at 15:24

## 2024-02-09 RX ADMIN — VENLAFAXINE HYDROCHLORIDE 225 MG: 150 CAPSULE, EXTENDED RELEASE ORAL at 08:52

## 2024-02-09 RX ADMIN — ENOXAPARIN SODIUM 40 MG: 40 INJECTION SUBCUTANEOUS at 05:36

## 2024-02-09 RX ADMIN — ENOXAPARIN SODIUM 40 MG: 40 INJECTION SUBCUTANEOUS at 17:52

## 2024-02-09 RX ADMIN — OLANZAPINE 10 MG: 5 TABLET, FILM COATED ORAL at 21:29

## 2024-02-09 RX ADMIN — METFORMIN HYDROCHLORIDE 500 MG: 500 TABLET ORAL at 17:52

## 2024-02-09 RX ADMIN — ALPRAZOLAM 1 MG: 0.5 TABLET ORAL at 21:29

## 2024-02-09 ASSESSMENT — ACTIVITIES OF DAILY LIVING (ADL)
ADLS_ACUITY_SCORE: 23

## 2024-02-09 NOTE — PLAN OF CARE
Goal Outcome Evaluation:    SUMMARY: Suicide attempt with hx of borderline personality disorder    DATE & TIME: 2/8-2/9; 1900-0730      Cognitive Concerns/ Orientation : A/Ox4     BEHAVIOR & AGGRESSION TOOL COLOR: Green    CIWA SCORE: NA     ABNL VS/O2: VSS on RA    MOBILITY: Independent    PAIN MANAGMENT: denies    DIET: Regular    BOWEL/BLADDER: Cont BB    ABNL LAB/BG: None    DRAIN/DEVICES: None    TELEMETRY RHYTHM: NA    SKIN: WDL    TESTS/PROCEDURES: Covid+ awaiting negative testing    D/C DAY/GOALS/PLACE: 2/12 to inpatient mental health    OTHER IMPORTANT INFO: 1:1 sit for suicidal ideation

## 2024-02-09 NOTE — PROGRESS NOTES
CLINICAL NUTRITION SERVICES - BRIEF NOTE    Reviewed nutrition risk factors for LOS. Pt is tolerating diet, ordering adequately, and good/adequate oral intake per nursing documentation. Reviewed wt hx.  No unintentional wt loss PTA. No active wounds. No nutrition interventions at this time.     Wt Readings from Last 10 Encounters:   01/31/24 140.6 kg (310 lb)   12/14/23 137.2 kg (302 lb 6.4 oz)   08/23/23 142 kg (313 lb)   08/15/22 127.6 kg (281 lb 6.4 oz)   04/11/22 129.3 kg (285 lb)   09/01/21 126.2 kg (278 lb 3.2 oz)   06/25/21 122.8 kg (270 lb 12.8 oz)   05/22/21 118.5 kg (261 lb 4.8 oz)   02/10/21 108.9 kg (240 lb)   06/18/20 99.6 kg (219 lb 9.6 oz)     Monitoring/Evaluation  Will continue to monitor and evaluate per protocol.    Dilan Raya RD, LD

## 2024-02-09 NOTE — CONSULTS
Initial Psychiatric Consult   Consult date: February 5, 2024         Reason for Consult, requesting source:    Reason for Consult: suicide risk assessment   Requesting source: Vikash Wyatt    Labs and imaging reviewed.     Total time spent in chart review, patient interview and coordination of care; 60 min         HPI:   39 yo woman with major depression and borderline personality disorder admitted to [facility] for suicidal ideas but admitted to med surg for COVID. [facility] had filed for commitment due to high suicide risk and the petition was upheld. Psychiatry was consulted for further psych dispo and management.     Currently in treatment with NP at [facility] (Betty Cazares NP) who diagnosed her with MDD, IVY and BPD and has her on effexor, Zyprexa ambien, trazodone and xanax. Last fill of Xanax in 12/2023 and last fill of zolpidem on 1/13/2024.     On interview today patient says she has been feeling somewhat better since restarting on her medications. She says she stopped her olanzapine about 3 months ago for weight gain and has been progressively feeling worse. She apparently texted her psychiatric NP a suicidal message and has been thinking about overdosing on trazodone.  However she now feels she is improved and that she is better off with outpatient treatment. When asked what keeps her alive despite her prior suicide attempts cites her children. Graduated from DBT program in 1/2024 which had been helpful. Currently lives with her parents.     2/9/2024: Patient seen today for follow-up in regards to question of [facility] order.  Chart reviewed.  Patient has very severe mental illness with many past psychiatric hospitalizations, multiple courses of ECT including a [facility] order with commitments in the past.  She had previously been on a milligram of Xanax 3 times daily, previously she was on Klonopin 1 mg p.o. 3 times daily.  Patient last filled on 12/30/2023 after filling regularly every month.   Urine drug screen was negative for benzodiazepines.    Patient has a very flat affect today, and does report suicidality.  He was not able to answer questions very well.  I do wonder about some catatonia.  Will treat with scheduled Xanax, and increase patient's Effexor.        Past Psychiatric History:   Past Diagnoses: MDD, IVY, BPD   Medication Trials: zyprexa, effexor, ambien, trazodone, xanax   Past/Current Providers: Betty Cazares NP  Psychiatric Hospitalizations: multiple   Suicide Attempts: multiple         Substance Use and History:   None     Social History     Tobacco Use    Smoking status: Never    Smokeless tobacco: Never   Substance Use Topics    Alcohol use: Not Currently           Past Medical History:   PAST MEDICAL HISTORY:   Past Medical History:   Diagnosis Date    Depressive disorder     Generalized anxiety disorder 5/15/2020       PAST SURGICAL HISTORY:   Past Surgical History:   Procedure Laterality Date    CHOLECYSTECTOMY               Family History:   FAMILY HISTORY:   Family History   Problem Relation Age of Onset    Diabetes Father            Social History:     Current Living arrangement: lives with parents   Relationships: single   Children: 2 children, family member takes care of children   Occupation/Finances: unemployed   Local Support: parents          Physical ROS:   The 10 point Review of Systems is negative other than noted in the HPI or here.           Medications:      ALPRAZolam  1 mg Oral TID    enoxaparin ANTICOAGULANT  40 mg Subcutaneous Q12H    metFORMIN  500 mg Oral BID w/meals    OLANZapine  10 mg Oral At Bedtime    sodium chloride (PF)  3 mL Intracatheter Q8H    [START ON 2/10/2024] venlafaxine  300 mg Oral Daily with breakfast              Allergies:   No Known Allergies       Labs:     Recent Results (from the past 48 hour(s))   Platelet count    Collection Time: 02/09/24 10:50 AM   Result Value Ref Range    Platelet Count 318 150 - 450 10e3/uL   Creatinine     "Collection Time: 02/09/24 10:50 AM   Result Value Ref Range    Creatinine 0.73 0.51 - 0.95 mg/dL    GFR Estimate >90 >60 mL/min/1.73m2          Physical and Psychiatric Examination:     BP (!) 139/101 (BP Location: Left arm)   Pulse 65   Temp 97.5  F (36.4  C) (Oral)   Resp 18   Ht 1.651 m (5' 5\")   Wt 140.6 kg (310 lb)   SpO2 96%   BMI 51.59 kg/m    Weight is 310 lbs 0 oz  Body mass index is 51.59 kg/m .    Physical Exam:  I have reviewed the physical exam as documented by by the medical team and agree with findings and assessment and have no additional findings to add at this time.         MSE:   Appearance: awake, alert  Attitude:  somewhat cooperative  Eye Contact:  fair  Mood:  \"fair\"  Affect:  slightly restricted  Speech:  clear, coherent  Psychomotor Behavior:  no evidence of tardive dyskinesia, dystonia, or tics  Muscle strength and tone: not formally assessed   Thought Process:  linear  Associations:  no loose associations  Thought Content:  no evidence of psychotic thought and passive suicidal ideation present  Insight:  fair  Judgement:  fair  Oriented to:  time, person, and place  Attention Span and Concentration:  intact  Recent and Remote Memory:  intact     EKG:          DSM-5 Diagnosis:   Severe major depressive disorder, recurrent, with psychosis.  Rule out catatonia.    Benzodiazepine withdrawal delirium    Rule out catatonia    Rule out schizoaffective disorder depressive type.  Unclear if patient's diagnosis is accurate          Assessment:   37 yo woman with major depression and borderline personality disorder admitted to [facility] for suicidal ideas but admitted to med surg for COVID. Petition is upheld and tentative plan is for inpatient psych admission; however much of her affective instability appears to have been driven by her character vulnerability which may be cooling off in a structured setting. Psych consult team will cont to follow and do serial reassessment for appropriateness of " "inpatient psych admission. Patient is a high chronic risk of suicide given her MDD, borderline character construct, prior suicide attempts.    2/9/2024: Patient has a history of very severe depression with psychosis, requiring ECT under commitment in the past.  She has had multiple courses of ECT, psychiatric hospitalizations, etc.  Current psychiatric formulation includes major depressive disorder with psychosis.  I do wonder if this is a missed diagnosis, and the patient may have a primary psychotic/affective disorder, i.e. schizoaffective disorder depressive type.    Additionally, patient is likely in benzodiazepine withdrawal.  She was regularly receiving Klonopin 1 mg #90, filling monthly.  She was then transition to Xanax 1 mg #90, also filling monthly.  This was last filled 12/30/2023.  Patient had a urine drug screen negative for benzodiazepines when she presented.          Summary of Recommendations:   Civil commitment petition to [facility] upheld. For now continue placement effort onto inpatient psych.  Will hold off on [facility] order currently.  Patient is willing to take antipsychotics.  We will see if she clears after restarting Xanax.      Have increased Effexor to 300 mg p.o. daily.      Genevieve Zamarripa MD    Consult Liaison Psychiatry/Addiction Medicine      \"This dictation was performed with voice recognition software and may contain errors,  omissions and inadvertent word substitution.\"           "

## 2024-02-09 NOTE — PROGRESS NOTES
Care Management Follow Up    Length of Stay (days): 5    Expected Discharge Date: 02/12/2024     Concerns to be Addressed: discharge planning     Patient plan of care discussed at interdisciplinary rounds: Yes    Anticipated Discharge Disposition: Inpatient Mental Health     Anticipated Discharge Services:    Anticipated Discharge DME:      Patient/family educated on Medicare website which has current facility and service quality ratings:    Education Provided on the Discharge Plan:    Patient/Family in Agreement with the Plan:      Referrals Placed by /: [facility]  Private pay costs discussed:     Additional Information:  On 2/9, patient has her virtual Court Exam and Preliminary Hearing. Exam is first at 0930. Writer will set up lap top and asked nursing staff to bring into patient.   The  representing the petition called today expressing concern that a [facility] Petition was not filed.  She explains in the past, patient has been non compliant with neuroleptic medications unless she is under a [facility].    Relayed to Dr Zamarripa. Either Dr Zamarripa or a Psychiatric NP will see patient tomorrow.     SAYRA PatiñoSW

## 2024-02-09 NOTE — PROGRESS NOTES
[facility]    HOSPITALIST PROGRESS NOTE :   --------------------------------------------------    Date of Admission:  1/31/2024    Cumulative Summary:     Misty Parham is a 38 year old female with major depressive disorder who presented to the ED on 1/31 with suicidal ideation. She was placed on a 72 hour hold while in the ED on 2/1/24 11:47 am as symptoms were impacting her ability to safely and appropriately function in the community, and she is at high risk for death by suicide accidental or intentional due to mental health hx and substance use hx. Hospital staff have petitioned for [place] civil commitment, awaiting court determination.    Subsequently complained of sore throat, tested positive for COVID-19 infection on 2/2/24 and thus cannot discharge to inpatient psych until she is off of special precautions per report. Hence admitted to inpatient unit on 2/3/2024 under hospitalist service.      Assessment & Plan     Suicidal ideation  Borderline personality disorder  Severe episode of recurrent major depressive disorder without psychotic features  *Patient was placed on a 72 hour hold 2/1/24   *patient is placed on court hold on 02/06/24    -- Psychiatry consult requested during this stay , appreciate their help   -- Continue Effexor, dose increased to 225 mg daily on 2/1.  -- QTC is monitored , recheck EKG on Monday   -- Continue Zyprexa 10 mg HS  -- Continue Metformin 500 mg BID, started on 2/1, to help minimize metabolic side effects related to Zyprexa  -- patient is not sleeping very well, PTA medications include 50 mg Trazadone at bedtime and Ambien 10 mg at bedtime PRN , patient has been taking ambien for long period of time   -- Trazodone will be held 3 days after Paxlovid is completed , probably can be started on 02/12/24  -- Her PTA TID scheduled Lorazepam has not been continued during this stay   -- PRN Lorazepam is available, patient not requiring PRN Lorazepam often  --  Suicide precautions, 1:1 sitter  -- Elopement precautions  -- SW/CC consult.  Supportive care.  -- SW received call from Tatiana from [facility] court, patient is now on court hold as of 11:33 AM on 06/06/24     -- Patient has virtual court exam and Preliminary hearing at 9:30 this morning . Court  representing the petition expressed concern that [facility] petition was not filed.  She explains in the past, patient has not been compliant with neuroleptic medications unless she is under [facility].  -- Psych consultation has been requested for evaluation, planning to see patient today.,  Appreciate their help    COVID-19 infection - tested positive 2/2/2024  Complaint of sore throat, intermittent cough.  No chest pain or palpitations  Temperature 101F, heart rate 120s at the time of admission.    *WBC 10.1.  Hemoglobin 15.3.  Creatinine 0.90.  Liver enzymes within normal limits.  CK 23, CRP 38.18.  Procalcitonin 0.18.  hCG qualitative negative.  D-dimer 0.44.  Troponin less than 6.  Chest x-ray showed no acute pathology.    -- Patient has been admitted for further evaluation and management   -- Completed Paxlovid on 02/08/24 AM  -- Continue Lovenox twice daily for DVT prophylaxis.  -- Followed CRP, downward trend     -- O2 sats are stable , no need for steroids   -- Aggressive incentive spirometry.    -- Encourage ambulation.  -- As needed antitussives.  Cepacol lozenges.    -- Magic mouthwash ordered.   -- Unable to be discharged to inpatient psych till ten day isolation is completed      Tachycardia from fever, infection; resolved     -- Per report patient not drinking much fluids due to sore throat, intermittent cough.   -- UA on admission with trace ketones.  Nitrite negative, leukocyte esterase negative.  --  EKG sinus rhythm, heart rate 80, QTc 449  --  Recent TSH from December within normal limits.  -- Received IV fluids. Tachycardia improved.  -- COVID treated as above     Elevated blood  "pressure;   -- Patient was noticed to have slightly elevated blood pressure especially diastolic close to 100, patient does not have any known history of hypertension, will continue to monitor for another day if patient continues to have high blood pressure then will start patient on amlodipine 2.5 mg p.o. daily.     Medically complicated obesity with a BMI of 51.59.  Increase all-cause mortality and morbidity.    --consider lifestyle modification with diet and exercise as able to.     Possible JEAN PIERRE/OHS.  --Sleep studies as outpatient recommended    Clinically Significant Risk Factors                         # Severe Obesity: Estimated body mass index is 51.59 kg/m  as calculated from the following:    Height as of this encounter: 1.651 m (5' 5\").    Weight as of this encounter: 140.6 kg (310 lb).        # Financial/Environmental Concerns: none         Diet: Regular Diet Adult    Allen Catheter: Not present  DVT Prophylaxis: Enoxaparin (Lovenox) SQ  Code Status: Full Code    The patient's care was discussed with the Bedside Nurse and Patient.    Medical Decision Making     38 MINUTES SPENT BY ME on the date of service doing chart review, history, exam, documentation & further activities per the note.      Disposition Plan     Expected Discharge Date: 02/12/2024        Discharge Comments: 10 days iso from admit, then Psych.  Petition for commitment.  On a 72 hour hold as of 2/1/24.     Entered: Bonnie Dumotn MD 02/09/2024, 7:34 AM       Bonnie Dumont MD, MD, FACP  Text Page (7am - 6pm)    ----------------------------------------------------------------------------------------------------------------------      Interval History     Patient was seen and examined, denying any new complaints, done with Paxlovid, did have court hearing this morning, thinks that they need further information.    -Data reviewed today: I reviewed all new labs and imaging results over the last 24 hours.    I personally reviewed no images or " EKG's today.    Physical Exam   Temp: 97.5  F (36.4  C) Temp src: Oral BP: (!) 139/101 Pulse: 65   Resp: 18 SpO2: 96 % O2 Device: None (Room air)    Vitals:    01/31/24 1321   Weight: 140.6 kg (310 lb)     Vital Signs with Ranges  Temp:  [97.5  F (36.4  C)-98.9  F (37.2  C)] 97.5  F (36.4  C)  Pulse:  [65-70] 65  Resp:  [18] 18  BP: (116-141)/() 139/101  SpO2:  [94 %-96 %] 96 %  I/O last 3 completed shifts:  In: 1540 [P.O.:1540]  Out: -     GENERAL: Alert , awake and oriented. NAD. Conversational, appropriate.   HEENT: Normocephalic. EOMI. No icterus or injection. Nares normal.   LUNGS: Clear to auscultation. No dyspnea at rest.   HEART: Regular rate. Extremities perfused.   ABDOMEN: Soft, nontender, and nondistended. Positive bowel sounds.   EXTREMITIES: No LE edema noted.   NEUROLOGIC: Moves extremities x4 on command. No acute focal neurologic abnormalities noted.     Medications      enoxaparin ANTICOAGULANT  40 mg Subcutaneous Q12H    metFORMIN  500 mg Oral BID w/meals    OLANZapine  10 mg Oral At Bedtime    sodium chloride (PF)  3 mL Intracatheter Q8H    venlafaxine  225 mg Oral Daily       Data   Recent Labs   Lab 02/06/24  1000 02/04/24  2326 02/03/24  1413   WBC  --  6.5 10.1   HGB  --  13.4 15.3   MCV  --  90 92    229 272   NA  --  139 138   POTASSIUM  --  3.6 3.8   CHLORIDE  --  104 98   CO2  --  26 27   BUN  --  6.9 10.2   CR 0.85 0.74 0.90   ANIONGAP  --  9 13   CRISTINE  --  9.1 9.8   GLC  --  102* 89   ALBUMIN  --  3.5 4.3   PROTTOTAL  --  6.3* 7.6   BILITOTAL  --  0.2 0.3   ALKPHOS  --  62 85   ALT  --  24 39   AST  --  25 43       Imaging:   No results found for this or any previous visit (from the past 24 hour(s)).

## 2024-02-09 NOTE — PLAN OF CARE
Goal Outcome Evaluation:             Summary:  admitted from ED w/ suicidal ideations and covid +  DATE & TIME: 02/08  Cognitive Concerns/ Orientation : A&Ox4, calm and cooperative.   BEHAVIOR & AGGRESSION TOOL COLOR: Green, sitter at bedside for safety.   ABNL VS/O2: VSS on RA.   MOBILITY: Independent in room.   PAIN MANAGMENT: Denies   BOWEL/BLADDER: Continent, using bathroom. Good appetite and fluid intake.   ABNL LAB/BG: Crp 23.07- improving. Recheck in AM.   DRAIN/DEVICES: No PIV- MD aware  TELEMETRY RHYTHM: NA  SKIN:  WNL  TESTS/PROCEDURES: AM labs   D/C DAY/GOALS/PLACE: Pending covid recovered status, and acceptance to inpatient mental health  OTHER IMPORTANT INFO: Suicidal precautions maintained, sitter at bed side, on 72 hr hold started on 2/3 at 1530. Psych following, plan to discharge to inpatient psych when medically stable. CC/SW following.

## 2024-02-09 NOTE — PLAN OF CARE
Goal Outcome Evaluation:       SUMMARY: Suicide attempt with hx of borderline personality disorder    DATE & TIME: 2/9/24 5088-1522      Cognitive Concerns/ Orientation : A/Ox4, flat affect     BEHAVIOR & AGGRESSION TOOL COLOR: Green    CIWA SCORE: NA     ABNL VS/O2: VSS on RA    MOBILITY: Independent in room     PAIN MANAGMENT: denies    DIET: Regular    BOWEL/BLADDER: Cont BB    ABNL LAB/BG: WNL    DRAIN/DEVICES: None    TELEMETRY RHYTHM: NA    SKIN: WDL    TESTS/PROCEDURES: Covid+ awaiting negative testing to be possibly cleared 2/14    D/C DAY/GOALS/PLACE:  inpatient mental health pending     OTHER IMPORTANT INFO: 1:1 sit for suicidal ideation.  Psychiatry saw patient and started patient on scheduled Xanax TID and Effexor dose increased.     .Current condition (current mood & behavior): Calm and cooperative, flat affect  Sitter present: yes  Every 15 minute documentation by NA/RN completed for Shift: yes  Room safety Suicide Checklist completed in Epic: yes  Patient's color of severity (suicide scale): YELLOW  Order for psych consult placed (if appropriate): yes  Suicide care plan added: yes      Marlen Grant RN

## 2024-02-10 PROCEDURE — 250N000013 HC RX MED GY IP 250 OP 250 PS 637: Performed by: PSYCHIATRY & NEUROLOGY

## 2024-02-10 PROCEDURE — 250N000011 HC RX IP 250 OP 636: Performed by: PHYSICIAN ASSISTANT

## 2024-02-10 PROCEDURE — 99232 SBSQ HOSP IP/OBS MODERATE 35: CPT | Performed by: INTERNAL MEDICINE

## 2024-02-10 PROCEDURE — 120N000001 HC R&B MED SURG/OB

## 2024-02-10 PROCEDURE — 250N000013 HC RX MED GY IP 250 OP 250 PS 637

## 2024-02-10 RX ADMIN — ALPRAZOLAM 1 MG: 0.5 TABLET ORAL at 21:50

## 2024-02-10 RX ADMIN — ALPRAZOLAM 1 MG: 0.5 TABLET ORAL at 08:20

## 2024-02-10 RX ADMIN — ENOXAPARIN SODIUM 40 MG: 40 INJECTION SUBCUTANEOUS at 16:28

## 2024-02-10 RX ADMIN — ENOXAPARIN SODIUM 40 MG: 40 INJECTION SUBCUTANEOUS at 05:54

## 2024-02-10 RX ADMIN — OLANZAPINE 10 MG: 5 TABLET, FILM COATED ORAL at 21:50

## 2024-02-10 RX ADMIN — ALPRAZOLAM 1 MG: 0.5 TABLET ORAL at 16:28

## 2024-02-10 RX ADMIN — METFORMIN HYDROCHLORIDE 500 MG: 500 TABLET ORAL at 08:20

## 2024-02-10 RX ADMIN — METFORMIN HYDROCHLORIDE 500 MG: 500 TABLET ORAL at 17:40

## 2024-02-10 RX ADMIN — VENLAFAXINE HYDROCHLORIDE 300 MG: 150 CAPSULE, EXTENDED RELEASE ORAL at 08:20

## 2024-02-10 ASSESSMENT — ACTIVITIES OF DAILY LIVING (ADL)
ADLS_ACUITY_SCORE: 23

## 2024-02-10 NOTE — PROGRESS NOTES
Care Management Follow Up    Length of Stay (days): 6    Expected Discharge Date: 02/13/2024     Concerns to be Addressed: discharge planning     Patient plan of care discussed at interdisciplinary rounds: Yes    Anticipated Discharge Disposition: Inpatient Mental Health     Anticipated Discharge Services:    Anticipated Discharge DME:      Patient/family educated on Medicare website which has current facility and service quality ratings:    Education Provided on the Discharge Plan:    Patient/Family in Agreement with the Plan:      Referrals Placed by /: [facility]  Private pay costs discussed:     Additional Information:  Laura had her  virtual Examination and her Preliminary Hearing was waived.  Her Hearing is scheduled for 2/14, the time is to be determined.     Monse Tong, SAYRASW

## 2024-02-10 NOTE — PLAN OF CARE
Goal Outcome Evaluation:         SUMMARY: Suicide attempt with hx of borderline personality disorder    DATE & TIME: 2/9/24-2/10/24 3633-8571      Cognitive Concerns/ Orientation : A/Ox4, flat affect     BEHAVIOR & AGGRESSION TOOL COLOR: Green    CIWA SCORE: NA     ABNL VS/O2: VSS on RA    MOBILITY: Independent in room     PAIN MANAGMENT: denies    DIET: Regular    BOWEL/BLADDER: Cont BB    ABNL LAB/BG: WNL    DRAIN/DEVICES: None    TELEMETRY RHYTHM: NA    SKIN: WDL    TESTS/PROCEDURES: Covid+ awaiting negative testing to be possibly cleared 2/14    D/C DAY/GOALS/PLACE:  inpatient mental health pending     OTHER IMPORTANT INFO: 1:1 sit for suicidal ideation.  Psychiatry saw patient and started patient on scheduled Xanax TID and Effexor dose increased.     .Current condition (current mood & behavior): Calm and cooperative, flat affect  Sitter present: yes  Every 15 minute documentation by NA/RN completed for Shift: yes  Room safety Suicide Checklist completed in Epic: yes  Patient's color of severity (suicide scale): YELLOW  Order for psych consult placed (if appropriate): yes  Suicide care plan added: yes      Eda Pace RN

## 2024-02-10 NOTE — PLAN OF CARE
Goal Outcome Evaluation:     SUMMARY: Suicide attempt with hx of borderline personality disorder  DATE & TIME: 2/10/2024, 7742-9454  Cognitive Concerns/ Orientation : A/O x4, flat affect   BEHAVIOR & AGGRESSION TOOL COLOR: Green  CIWA SCORE: NA        ABNL VS/O2: VSS on RA  MOBILITY: Independent in room   PAIN MANAGMENT: denies  DIET: Regular  BOWEL/BLADDER: Cont B/B in BR  ABNL LAB/BG: No new labs since 2/9  DRAIN/DEVICES: None  TELEMETRY RHYTHM: NA  SKIN: WDL  TESTS/PROCEDURES: Covid+ awaiting negative test to be possibly cleared 2/14  D/C DAY/GOALS/PLACE:  Inpatient mental health pending   OTHER IMPORTANT INFO: 1:1 sit for suicidal ideation.  Psychiatry saw patient and started patient on scheduled Xanax TID and Effexor dose increased.      Current condition (current mood & behavior): Calm and cooperative, flat affect  Sitter present: yes  Every 15 minute documentation by NA/RN completed for Shift: yes  Room safety Suicide Checklist completed in Epic: yes  Patient's color of severity (suicide scale): YELLOW  Order for psych consult placed (if appropriate): yes  Suicide care plan added: yes

## 2024-02-10 NOTE — PROGRESS NOTES
[facility]    HOSPITALIST PROGRESS NOTE :   --------------------------------------------------    Date of Admission:  1/31/2024    Cumulative Summary:     Misty Parham is a 38 year old female with major depressive disorder who presented to the ED on 1/31 with suicidal ideation. She was placed on a 72 hour hold while in the ED on 2/1/24 11:47 am as symptoms were impacting her ability to safely and appropriately function in the community, and she is at high risk for death by suicide accidental or intentional due to mental health hx and substance use hx. Hospital staff have petitioned for MI civil commitment, awaiting court determination.    Subsequently complained of sore throat, tested positive for COVID-19 infection on 2/2/24 and thus cannot discharge to inpatient psych until she is off of special precautions per report. Hence admitted to inpatient unit on 2/3/2024 under hospitalist service.      Assessment & Plan     Suicidal ideation  Borderline personality disorder  Severe episode of recurrent major depressive disorder without psychotic features  *Patient was placed on a 72 hour hold 2/1/24   *patient is placed on court hold on 02/06/24    -- Psychiatry consult requested during this stay , appreciate their help   -- Continue Effexor, dose increased to 225 mg daily on 2/1.  -- QTC is monitored , recheck EKG on Monday   -- Continue Zyprexa 10 mg HS  -- Continue Metformin 500 mg BID, started on 2/1, to help minimize metabolic side effects related to Zyprexa  -- patient is not sleeping very well, PTA medications include 50 mg Trazadone at bedtime and Ambien 10 mg at bedtime PRN , patient has been taking ambien for long period of time   -- Trazodone will be held 3 days after Paxlovid is completed , probably can be started on 02/12/24  -- Her PTA TID scheduled Lorazepam has not been continued during this stay   -- PRN Lorazepam is available, patient not requiring PRN Lorazepam often  --  Suicide precautions, 1:1 sitter  -- Elopement precautions  -- SW/CC consult.  Supportive care.  -- SW received call from Tatiana from [facility] court, patient is now on court hold as of 11:33 AM on 06/06/24     -- Patient has virtual court exam and Preliminary hearing at 9:30 this morning . Court  representing the petition expressed concern that [place] petition was not filed.  She explains in the past, patient has not been compliant with neuroleptic medications unless she is under [place].  -- Appreciate psych evaluation, per psych patient has history of severe mental illness with many past psychiatric hospitalization, multiple courses of ECT including up [facility] reported with commitments in the past.  She had previously been on a milligram of Xanax 3 times daily, patient has not been receiving any benzodiazepine, psychiatry is concerned about catatonia, they are restarting patient on scheduled Xanax and I am increasing the Effexor  -- Sinew to monitor patient closely.    COVID-19 infection - tested positive 2/2/2024  Complaint of sore throat, intermittent cough.  No chest pain or palpitations  Temperature 101F, heart rate 120s at the time of admission.    *WBC 10.1.  Hemoglobin 15.3.  Creatinine 0.90.  Liver enzymes within normal limits.  CK 23, CRP 38.18.  Procalcitonin 0.18.  hCG qualitative negative.  D-dimer 0.44.  Troponin less than 6.  Chest x-ray showed no acute pathology.    -- Patient has been admitted for further evaluation and management   -- Completed Paxlovid on 02/08/24 AM  -- Continue Lovenox twice daily for DVT prophylaxis.  -- Followed CRP, downward trend     -- O2 sats are stable , no need for steroids   -- Aggressive incentive spirometry.    -- Encourage ambulation.  -- As needed antitussives.  Cepacol lozenges.    -- Magic mouthwash ordered.   -- Unable to be discharged to inpatient psych till ten day isolation is completed      Tachycardia from fever,  "infection; resolved     -- Per report patient not drinking much fluids due to sore throat, intermittent cough.   -- UA on admission with trace ketones.  Nitrite negative, leukocyte esterase negative.  --  EKG sinus rhythm, heart rate 80, QTc 449  --  Recent TSH from December within normal limits.  -- Received IV fluids. Tachycardia improved.  -- COVID treated as above     Elevated blood pressure;   -- Patient was noticed to have slightly elevated blood pressure especially diastolic close to 100, patient does not have any known history of hypertension, will continue to monitor for another day if patient continues to have high blood pressure then will start patient on amlodipine 2.5 mg p.o. daily.     Medically complicated obesity with a BMI of 51.59.  Increase all-cause mortality and morbidity.    --consider lifestyle modification with diet and exercise as able to.     Possible JEAN PIERRE/OHS.  --Sleep studies as outpatient recommended    Clinically Significant Risk Factors                         # Severe Obesity: Estimated body mass index is 51.59 kg/m  as calculated from the following:    Height as of this encounter: 1.651 m (5' 5\").    Weight as of this encounter: 140.6 kg (310 lb).        # Financial/Environmental Concerns: none         Diet: Regular Diet Adult    Allen Catheter: Not present  DVT Prophylaxis: Enoxaparin (Lovenox) SQ  Code Status: Full Code    The patient's care was discussed with the Bedside Nurse and Patient.    Medical Decision Making     40 MINUTES SPENT BY ME on the date of service doing chart review, history, exam, documentation & further activities per the note.      Disposition Plan     Expected Discharge Date: 02/13/2024        Discharge Comments: 10 days iso from admit, then Psych.  Petition for commitment.  On a 72 hour hold as of 2/1/24. will recovered on 2/13     Entered: Bonnie Dumont MD 02/10/2024, 7:35 AM       Bonnie Dumont MD, MD, FACP  Text Page (7am - " 6pm)    ----------------------------------------------------------------------------------------------------------------------      Interval History     Patient was seen and examined, denying any new complaints.  Continues to have intermittent ideations of suicide, undergoing court hearing, remains cooperative with exam    -Data reviewed today: I reviewed all new labs and imaging results over the last 24 hours.    I personally reviewed no images or EKG's today.    Physical Exam   Temp: 97.5  F (36.4  C) Temp src: Axillary BP: 114/79 Pulse: 56   Resp: 18 SpO2: 93 % O2 Device: None (Room air)    Vitals:    01/31/24 1321   Weight: 140.6 kg (310 lb)     Vital Signs with Ranges  Temp:  [97.5  F (36.4  C)-98.6  F (37  C)] 97.5  F (36.4  C)  Pulse:  [56-78] 56  Resp:  [18] 18  BP: (106-120)/(68-80) 114/79  SpO2:  [93 %-97 %] 93 %  I/O last 3 completed shifts:  In: 1410 [P.O.:1410]  Out: -     GENERAL: Alert , awake and oriented. NAD. Conversational, appropriate.   HEENT: Normocephalic. EOMI. No icterus or injection. Nares normal.   LUNGS: Clear to auscultation. No dyspnea at rest.   HEART: Regular rate. Extremities perfused.   ABDOMEN: Soft, nontender, and nondistended. Positive bowel sounds.   EXTREMITIES: No LE edema noted.   NEUROLOGIC: Moves extremities x4 on command. No acute focal neurologic abnormalities noted.     Medications      ALPRAZolam  1 mg Oral TID    enoxaparin ANTICOAGULANT  40 mg Subcutaneous Q12H    metFORMIN  500 mg Oral BID w/meals    OLANZapine  10 mg Oral At Bedtime    sodium chloride (PF)  3 mL Intracatheter Q8H    venlafaxine  300 mg Oral Daily with breakfast       Data   Recent Labs   Lab 02/09/24  1050 02/06/24  1000 02/04/24  2326 02/03/24  1413   WBC  --   --  6.5 10.1   HGB  --   --  13.4 15.3   MCV  --   --  90 92    254 229 272   NA  --   --  139 138   POTASSIUM  --   --  3.6 3.8   CHLORIDE  --   --  104 98   CO2  --   --  26 27   BUN  --   --  6.9 10.2   CR 0.73 0.85 0.74 0.90    ANIONGAP  --   --  9 13   CRISTINE  --   --  9.1 9.8   GLC  --   --  102* 89   ALBUMIN  --   --  3.5 4.3   PROTTOTAL  --   --  6.3* 7.6   BILITOTAL  --   --  0.2 0.3   ALKPHOS  --   --  62 85   ALT  --   --  24 39   AST  --   --  25 43       Imaging:   No results found for this or any previous visit (from the past 24 hour(s)).

## 2024-02-11 PROCEDURE — 99232 SBSQ HOSP IP/OBS MODERATE 35: CPT | Performed by: INTERNAL MEDICINE

## 2024-02-11 PROCEDURE — 250N000013 HC RX MED GY IP 250 OP 250 PS 637: Performed by: PSYCHIATRY & NEUROLOGY

## 2024-02-11 PROCEDURE — 250N000011 HC RX IP 250 OP 636: Performed by: PHYSICIAN ASSISTANT

## 2024-02-11 PROCEDURE — 120N000001 HC R&B MED SURG/OB

## 2024-02-11 PROCEDURE — 250N000013 HC RX MED GY IP 250 OP 250 PS 637

## 2024-02-11 RX ADMIN — VENLAFAXINE HYDROCHLORIDE 300 MG: 150 CAPSULE, EXTENDED RELEASE ORAL at 08:33

## 2024-02-11 RX ADMIN — ENOXAPARIN SODIUM 40 MG: 40 INJECTION SUBCUTANEOUS at 04:45

## 2024-02-11 RX ADMIN — OLANZAPINE 10 MG: 5 TABLET, FILM COATED ORAL at 21:59

## 2024-02-11 RX ADMIN — ALPRAZOLAM 1 MG: 0.5 TABLET ORAL at 21:59

## 2024-02-11 RX ADMIN — METFORMIN HYDROCHLORIDE 500 MG: 500 TABLET ORAL at 17:54

## 2024-02-11 RX ADMIN — METFORMIN HYDROCHLORIDE 500 MG: 500 TABLET ORAL at 08:33

## 2024-02-11 RX ADMIN — ALPRAZOLAM 1 MG: 0.5 TABLET ORAL at 15:45

## 2024-02-11 RX ADMIN — ALPRAZOLAM 1 MG: 0.5 TABLET ORAL at 08:33

## 2024-02-11 RX ADMIN — ENOXAPARIN SODIUM 40 MG: 40 INJECTION SUBCUTANEOUS at 16:30

## 2024-02-11 ASSESSMENT — ACTIVITIES OF DAILY LIVING (ADL)
ADLS_ACUITY_SCORE: 23

## 2024-02-11 NOTE — PLAN OF CARE
Goal Outcome Evaluation:     SUMMARY: Suicide attempt with hx of borderline personality disorder  DATE & TIME: 2/11/2024, [id number]  Cognitive Concerns/ Orientation : A/O x4, flat affect   BEHAVIOR & AGGRESSION TOOL COLOR: Green  CIWA SCORE: NA        ABNL VS/O2: VSS on RA  MOBILITY: Independent in room   PAIN MANAGMENT: denies  DIET: Regular  BOWEL/BLADDER: Cont B/B in BR  ABNL LAB/BG: No new labs since 2/9  DRAIN/DEVICES: None  TELEMETRY RHYTHM: NA  SKIN: WDL  TESTS/PROCEDURES: Covid+ awaiting negative test to be possibly cleared 2/14  D/C DAY/GOALS/PLACE:  Inpatient mental health pending   OTHER IMPORTANT INFO: 1:1 sit for suicidal ideation.  Psychiatry saw patient and started patient on scheduled Xanax TID and Effexor dose increased.      Current condition (current mood & behavior): Calm and cooperative, flat affect  Sitter present: yes  Every 15 minute documentation by NA/RN completed for Shift: yes  Room safety Suicide Checklist completed in Epic: yes  Patient's color of severity (suicide scale): YELLOW  Order for psych consult placed (if appropriate): yes  Suicide care plan added: yes    Ro Martinez, RN      Goal Outcome Evaluation:     Plan of Care Reviewed With: patient     Overall Patient Progress: no changeOverall Patient Progress: no change

## 2024-02-11 NOTE — PROGRESS NOTES
[facility]    HOSPITALIST PROGRESS NOTE :   --------------------------------------------------    Date of Admission:  1/31/2024    Cumulative Summary:     Misty Parham is a 38 year old female with major depressive disorder who presented to the ED on 1/31 with suicidal ideation. She was placed on a 72 hour hold while in the ED on 2/1/24 11:47 am as symptoms were impacting her ability to safely and appropriately function in the community, and she is at high risk for death by suicide accidental or intentional due to mental health hx and substance use hx. Hospital staff have petitioned for MI civil commitment, awaiting court determination.    Subsequently complained of sore throat, tested positive for COVID-19 infection on 2/2/24 and thus cannot discharge to inpatient psych until she is off of special precautions per report. Hence admitted to inpatient unit on 2/3/2024 under hospitalist service.      Assessment & Plan     Suicidal ideation  Borderline personality disorder  Severe episode of recurrent major depressive disorder without psychotic features  *Patient was placed on a 72 hour hold 2/1/24   *patient is placed on court hold on 02/06/24    -- Psychiatry consult requested during this stay , appreciate their help   -- Effexor dose is increased to 300 mg daily by psychiatry on 02/10/2024  -- QTC is monitored , recheck EKG on Monday   -- Continue Zyprexa 10 mg HS  -- Continue Metformin 500 mg BID, started on 2/1, to help minimize metabolic side effects related to Zyprexa  -- patient is not sleeping very well, PTA medications include 50 mg Trazadone at bedtime and Ambien 10 mg at bedtime PRN , patient has been taking ambien for long period of time   -- Trazodone will be held 3 days after Paxlovid is completed , probably can be started on 02/12/24 night  -- Her PTA TID scheduled Lorazepam was not continued initially during the hospitalization  --Once evaluated by psychiatry, psych was  concerned about her history of severe mental illness with many past psychiatric hospitalization, multiple courses of ECT including [facility] and commitments in the past.  -- Per psych they were concerned about catatonia from benzodiazepine withdrawal and have a started patient on alprazolam 1 mg p.o. 3 times daily scheduled  -- Will recommend consulting psychiatry on 02/13/2024 for follow-up.  -- Continue suicide precautions  -- Continue elopement precautions  -- /care coordinator following closely  -- Patient had vaginal examination and preliminary hearing was believed  -- Her court hearing is scheduled for 02/14, time to be determined, SW following      COVID-19 infection - tested positive 2/2/2024  Complaint of sore throat, intermittent cough.  No chest pain or palpitations  Temperature 101F, heart rate 120s at the time of admission.    *WBC 10.1.  Hemoglobin 15.3.  Creatinine 0.90.  Liver enzymes within normal limits.  CK 23, CRP 38.18.  Procalcitonin 0.18.  hCG qualitative negative.  D-dimer 0.44.  Troponin less than 6.  Chest x-ray showed no acute pathology.    -- Patient is admitted for further evaluation and management   -- Completed Paxlovid on 02/08/24 AM  -- Continue Lovenox twice daily for DVT prophylaxis.  -- Followed CRP, downward trend     -- O2 sats are stable , no need for steroids   -- Aggressive incentive spirometry.    -- Encourage ambulation.  -- As needed antitussives.  Cepacol lozenges.    -- Magic mouthwash ordered.   -- Unable to be discharged to inpatient psych till ten day COVID isolation is completed      Tachycardia from fever, infection; resolved     -- Admission workup was negative, except positive COVID PCR as above  --  Recent TSH from December within normal limits.  --  Received IV fluids. Tachycardia improved.  --  COVID treated as above     Elevated blood pressure;   -- Patient was noticed to have slightly elevated blood pressure especially diastolic close to  "100, patient does not have any known history of hypertension,  -- will continue to monitor , blood pressure is significantly improved    Medically complicated obesity with a BMI of 51.59.  Increase all-cause mortality and morbidity.    --consider lifestyle modification with diet and exercise as able to.     Possible JEAN PIERRE/OHS.  --Sleep studies as outpatient recommended    Clinically Significant Risk Factors                         # Severe Obesity: Estimated body mass index is 51.59 kg/m  as calculated from the following:    Height as of this encounter: 1.651 m (5' 5\").    Weight as of this encounter: 140.6 kg (310 lb).        # Financial/Environmental Concerns: none         Diet: Regular Diet Adult    Allen Catheter: Not present  DVT Prophylaxis: Enoxaparin (Lovenox) SQ  Code Status: Full Code    Patient's care was discussed with the Bedside Nurse and Patient.    Medical Decision Making     38 MINUTES SPENT BY ME on the date of service doing chart review, history, exam, documentation & further activities per the note.      Disposition Plan     Expected Discharge Date: 02/13/2024        Discharge Comments: 10 days iso from admit, then Psych.  Petition for commitment.  On a 72 hour hold as of 2/1/24. will recovered on 2/13     Entered: Bonnie Dumont MD 02/11/2024, 7:40 AM       Bonnie Dumont MD, MD, FACP  Text Page (7am - 6pm)    ----------------------------------------------------------------------------------------------------------------------      Interval History     Patient was seen and examined, sitting in room, looks comfortable  When asked about suicidal ideations, \"patient told me that she is not afraid to die, she will be with her angels after her death.\" We discussed about natural course of progression and how that is more natural process and its time, patient mentioned \"what if it is my time to go\"  Emotional support was provided, patient showed understanding, understands that it is important to stay and get " help.  She thinks that her anxiety might be slightly better as she has been started on scheduled Xanax.  Her next court hearing is on February 14        -Data reviewed today: I reviewed all new labs and imaging results over the last 24 hours.    I personally reviewed no images or EKG's today.    Physical Exam   Temp: 97.6  F (36.4  C) Temp src: Oral BP: 111/79 Pulse: 68   Resp: 18 SpO2: 95 % O2 Device: None (Room air)    Vitals:    01/31/24 1321   Weight: 140.6 kg (310 lb)     Vital Signs with Ranges  Temp:  [97.3  F (36.3  C)-98.7  F (37.1  C)] 97.6  F (36.4  C)  Pulse:  [68-78] 68  Resp:  [16-18] 18  BP: (110-130)/(72-83) 111/79  SpO2:  [94 %-98 %] 95 %  I/O last 3 completed shifts:  In: 1298 [P.O.:1298]  Out: -     GENERAL: Alert , awake and oriented. NAD. Conversational, appropriate.   HEENT: Normocephalic. EOMI. No icterus or injection. Nares normal.   LUNGS: Clear to auscultation. No dyspnea at rest.   HEART: Regular rate. Extremities perfused.   ABDOMEN: Soft, nontender, and nondistended. Positive bowel sounds.   EXTREMITIES: No LE edema noted.   NEUROLOGIC: Moves extremities x4 on command. No acute focal neurologic abnormalities noted.     Medications      ALPRAZolam  1 mg Oral TID    enoxaparin ANTICOAGULANT  40 mg Subcutaneous Q12H    metFORMIN  500 mg Oral BID w/meals    OLANZapine  10 mg Oral At Bedtime    sodium chloride (PF)  3 mL Intracatheter Q8H    venlafaxine  300 mg Oral Daily with breakfast       Data   Recent Labs   Lab 02/09/24  1050 02/06/24  1000 02/04/24  2326   WBC  --   --  6.5   HGB  --   --  13.4   MCV  --   --  90    254 229   NA  --   --  139   POTASSIUM  --   --  3.6   CHLORIDE  --   --  104   CO2  --   --  26   BUN  --   --  6.9   CR 0.73 0.85 0.74   ANIONGAP  --   --  9   CRISTINE  --   --  9.1   GLC  --   --  102*   ALBUMIN  --   --  3.5   PROTTOTAL  --   --  6.3*   BILITOTAL  --   --  0.2   ALKPHOS  --   --  62   ALT  --   --  24   AST  --   --  25       Imaging:   No results  found for this or any previous visit (from the past 24 hour(s)).

## 2024-02-12 LAB
CREAT SERPL-MCNC: 0.72 MG/DL (ref 0.51–0.95)
EGFRCR SERPLBLD CKD-EPI 2021: >90 ML/MIN/1.73M2
PLATELET # BLD AUTO: 199 10E3/UL (ref 150–450)

## 2024-02-12 PROCEDURE — 36415 COLL VENOUS BLD VENIPUNCTURE: CPT | Performed by: PHYSICIAN ASSISTANT

## 2024-02-12 PROCEDURE — 85049 AUTOMATED PLATELET COUNT: CPT | Performed by: PHYSICIAN ASSISTANT

## 2024-02-12 PROCEDURE — 250N000011 HC RX IP 250 OP 636: Performed by: PHYSICIAN ASSISTANT

## 2024-02-12 PROCEDURE — 250N000013 HC RX MED GY IP 250 OP 250 PS 637: Performed by: PSYCHIATRY & NEUROLOGY

## 2024-02-12 PROCEDURE — 99233 SBSQ HOSP IP/OBS HIGH 50: CPT | Performed by: PSYCHIATRY & NEUROLOGY

## 2024-02-12 PROCEDURE — 250N000013 HC RX MED GY IP 250 OP 250 PS 637

## 2024-02-12 PROCEDURE — 120N000001 HC R&B MED SURG/OB

## 2024-02-12 PROCEDURE — 99231 SBSQ HOSP IP/OBS SF/LOW 25: CPT | Performed by: STUDENT IN AN ORGANIZED HEALTH CARE EDUCATION/TRAINING PROGRAM

## 2024-02-12 PROCEDURE — 250N000013 HC RX MED GY IP 250 OP 250 PS 637: Performed by: INTERNAL MEDICINE

## 2024-02-12 PROCEDURE — 82565 ASSAY OF CREATININE: CPT | Performed by: PHYSICIAN ASSISTANT

## 2024-02-12 PROCEDURE — 250N000013 HC RX MED GY IP 250 OP 250 PS 637: Performed by: PHYSICIAN ASSISTANT

## 2024-02-12 RX ADMIN — ALPRAZOLAM 1 MG: 0.5 TABLET ORAL at 16:23

## 2024-02-12 RX ADMIN — OLANZAPINE 10 MG: 5 TABLET, FILM COATED ORAL at 20:26

## 2024-02-12 RX ADMIN — ALPRAZOLAM 1 MG: 0.5 TABLET ORAL at 08:20

## 2024-02-12 RX ADMIN — ENOXAPARIN SODIUM 40 MG: 40 INJECTION SUBCUTANEOUS at 05:22

## 2024-02-12 RX ADMIN — ZOLPIDEM TARTRATE 10 MG: 5 TABLET ORAL at 21:09

## 2024-02-12 RX ADMIN — ACETAMINOPHEN 650 MG: 325 TABLET, FILM COATED ORAL at 05:21

## 2024-02-12 RX ADMIN — METFORMIN HYDROCHLORIDE 500 MG: 500 TABLET ORAL at 17:55

## 2024-02-12 RX ADMIN — DOCUSATE SODIUM 100 MG: 100 CAPSULE, LIQUID FILLED ORAL at 21:17

## 2024-02-12 RX ADMIN — VENLAFAXINE HYDROCHLORIDE 300 MG: 150 CAPSULE, EXTENDED RELEASE ORAL at 08:20

## 2024-02-12 RX ADMIN — ALPRAZOLAM 1 MG: 0.5 TABLET ORAL at 21:09

## 2024-02-12 RX ADMIN — ENOXAPARIN SODIUM 40 MG: 40 INJECTION SUBCUTANEOUS at 16:23

## 2024-02-12 RX ADMIN — METFORMIN HYDROCHLORIDE 500 MG: 500 TABLET ORAL at 08:20

## 2024-02-12 ASSESSMENT — ACTIVITIES OF DAILY LIVING (ADL)
ADLS_ACUITY_SCORE: 23

## 2024-02-12 NOTE — PROGRESS NOTES
Care Management Follow Up    Length of Stay (days): 9    Expected Discharge Date: 02/13/2024     Concerns to be Addressed: discharge planning     Patient plan of care discussed at interdisciplinary rounds: Yes    Anticipated Discharge Disposition: Inpatient Mental Health     Anticipated Discharge Services:    Anticipated Discharge DME:      Patient/family educated on Medicare website which has current facility and service quality ratings:    Education Provided on the Discharge Plan:    Patient/Family in Agreement with the Plan:      Referrals Placed by /: [facility] [place] pay costs discussed: Not applicable    Additional Information:  Continunance of Court Hold Order was faxed to [phone].  The Court Hold continues thru 2/14/24 when patient has her Commitment hearing.  Copy placed in paper chart under legal index and a copy given to patient.  Writer spoke with patient regarding paper and she expressed understanding of the court hold being in effective thru 2/14.    SAYRA PatiñoSW

## 2024-02-12 NOTE — PROGRESS NOTES
Initial Psychiatric Consult   Consult date: February 5, 2024         Reason for Consult, requesting source:    Reason for Consult: suicide risk assessment   Requesting source: Vikash Wyatt    Labs and imaging reviewed.     Total time spent in chart review, patient interview and coordination of care; 60 min         HPI:   37 yo woman with major depression and borderline personality disorder admitted to [facility] for suicidal ideas but admitted to med surg for COVID. [facility] had filed for commitment due to high suicide risk and the petition was upheld. Psychiatry was consulted for further psych dispo and management.     Currently in treatment with NP at [facility] (Betty Cazares NP) who diagnosed her with MDD, IVY and BPD and has her on effexor, Zyprexa ambien, trazodone and xanax. Last fill of Xanax in 12/2023 and last fill of zolpidem on 1/13/2024.     On interview today patient says she has been feeling somewhat better since restarting on her medications. She says she stopped her olanzapine about 3 months ago for weight gain and has been progressively feeling worse. She apparently texted her psychiatric NP a suicidal message and has been thinking about overdosing on trazodone.  However she now feels she is improved and that she is better off with outpatient treatment. When asked what keeps her alive despite her prior suicide attempts cites her children. Graduated from DBT program in 1/2024 which had been helpful. Currently lives with her parents.     2/9/2024: Patient seen today for follow-up in regards to question of [facility] order.  Chart reviewed.  Patient has very severe mental illness with many past psychiatric hospitalizations, multiple courses of ECT including a [facility] order with commitments in the past.  She had previously been on a milligram of Xanax 3 times daily, previously she was on Klonopin 1 mg p.o. 3 times daily.  Patient last filled on 12/30/2023 after filling regularly every month.   "Urine drug screen was negative for benzodiazepines.    Patient has a very flat affect today, and does report suicidality.  He was not able to answer questions very well.  I do wonder about some catatonia.  Will treat with scheduled Xanax, and increase patient's Effexor.    2/12/2024: Patient seen today for follow-up.  She is somewhat more talkative than she has been in the past.  She was not aware that I had put her back on Xanax, and did not know whether she felt better or not.  She does state that she takes it consistently at home.  Patient does say that she is noticing that she is talking more than she was which is different.  Then she told me that she is feeling better.  As we spoke, it became clear that the patient is actively suicidal, and thinking of ways to kill her self.  She states the last time she felt good was a couple of years ago.  She reports that if she kills her self, that she will be able to be with her children all the time because \"I will be an anmol\".  Patient appears disorganized, psychotic, and possibly somewhat catatonic.    She was able to tell me that she does not really want ECT again because it was not helpful reportedly, and also \"it caused memory problems\".  Patient also does not think any medications have helped her in the past.        Past Psychiatric History:   Past Diagnoses: MDD, IVY, BPD   Medication Trials: zyprexa, effexor, ambien, trazodone, xanax   Past/Current Providers: Betty Cazares NP  Psychiatric Hospitalizations: multiple   Suicide Attempts: multiple         Substance Use and History:   None     Social History     Tobacco Use    Smoking status: Never    Smokeless tobacco: Never   Substance Use Topics    Alcohol use: Not Currently           Past Medical History:   PAST MEDICAL HISTORY:   Past Medical History:   Diagnosis Date    Depressive disorder     Generalized anxiety disorder 5/15/2020       PAST SURGICAL HISTORY:   Past Surgical History:   Procedure Laterality " "Date    CHOLECYSTECTOMY               Family History:   FAMILY HISTORY:   Family History   Problem Relation Age of Onset    Diabetes Father            Social History:     Current Living arrangement: lives with parents   Relationships: single   Children: 2 children, family member takes care of children   Occupation/Finances: unemployed   Local Support: parents          Physical ROS:   The 10 point Review of Systems is negative other than noted in the HPI or here.           Medications:      ALPRAZolam  1 mg Oral TID    enoxaparin ANTICOAGULANT  40 mg Subcutaneous Q12H    metFORMIN  500 mg Oral BID w/meals    OLANZapine  10 mg Oral At Bedtime    sodium chloride (PF)  3 mL Intracatheter Q8H    venlafaxine  300 mg Oral Daily with breakfast              Allergies:   No Known Allergies       Labs:     Recent Results (from the past 48 hour(s))   Platelet count    Collection Time: 02/12/24  8:41 AM   Result Value Ref Range    Platelet Count 199 150 - 450 10e3/uL   Creatinine    Collection Time: 02/12/24  8:41 AM   Result Value Ref Range    Creatinine 0.72 0.51 - 0.95 mg/dL    GFR Estimate >90 >60 mL/min/1.73m2          Physical and Psychiatric Examination:     /83 (BP Location: Left arm)   Pulse 67   Temp 97.6  F (36.4  C) (Oral)   Resp 18   Ht 1.651 m (5' 5\")   Wt 140.6 kg (310 lb)   SpO2 94%   BMI 51.59 kg/m    Weight is 310 lbs 0 oz  Body mass index is 51.59 kg/m .    Physical Exam:  I have reviewed the physical exam as documented by by the medical team and agree with findings and assessment and have no additional findings to add at this time.         MSE:   Appearance: awake, alert  Attitude:  somewhat cooperative  Eye Contact:  poor   Mood:  \"fair\"  Affect:  intensity is flat  Speech:  clear, coherent, increased speech latency, and decreased prosody  Psychomotor Behavior:  physical retardation  Muscle strength and tone: not formally assessed   Thought Process:  evidence of thought blocking present and " concrete  Associations:  loosening of associations present  Thought Content:  active suicidal ideation present, plan for suicide present, and patient appears to be responding to internal stimuli  Insight:  limited  Judgement:  poor  Oriented to:  time, person, and place  Attention Span and Concentration:  limited  Recent and Remote Memory:  limited     EKG:          DSM-5 Diagnosis:   Severe major depressive disorder, recurrent, with psychosis.  Rule out catatonia.    Benzodiazepine withdrawal delirium    Rule out catatonia    Rule out schizoaffective disorder depressive type.  Unclear if patient's diagnosis is accurate          Assessment:   39 yo woman with major depression and borderline personality disorder admitted to [facility] for suicidal ideas but admitted to med surg for COVID. Petition is upheld and tentative plan is for inpatient psych admission; however much of her affective instability appears to have been driven by her character vulnerability which may be cooling off in a structured setting. Psych consult team will cont to follow and do serial reassessment for appropriateness of inpatient psych admission. Patient is a high chronic risk of suicide given her MDD, borderline character construct, prior suicide attempts.    2/9/2024: Patient has a history of very severe depression with psychosis, requiring ECT under commitment in the past.  She has had multiple courses of ECT, psychiatric hospitalizations, etc.  Current psychiatric formulation includes major depressive disorder with psychosis.  I do wonder if this is a missed diagnosis, and the patient may have a primary psychotic/affective disorder, i.e. schizoaffective disorder depressive type.    Additionally, patient is likely in benzodiazepine withdrawal.  She was regularly receiving Klonopin 1 mg #90, filling monthly.  She was then transition to Xanax 1 mg #90, also filling monthly.  This was last filled 12/30/2023.  Patient had a urine drug screen  "negative for benzodiazepines when she presented.    2/12/2024: Patient remains very psychiatrically decompensated.  As stated before, the patient may be misdiagnosed.  She appears to have a primary psychotic disorder to me, and clearly has very significant affect of symptoms.  She thinks of killing herself and how to kill her self, because quality of life is so bad related to her mental health symptoms.  I do believe she is psychotic, and is withholding probable hallucinations and delusional thinking.  I do not think this patient has a personality disorder per se, it is more likely that she has a very severe mental illness, and limited coping skills in that setting.          Summary of Recommendations:   Civil commitment petition to [facility] upheld. For now continue placement effort onto inpatient psych.  Will hold off on [facility] order currently.  Patient is willing to take antipsychotics.  We will see if she clears after restarting Xanax.      Have increased Effexor to 300 mg p.o. daily.    Please continue with sitter.  Patient is actively suicidal and has a plan to choke herself or strangle herself with cords in the room.          Genevieve Zamarripa MD    Consult Liaison Psychiatry/Addiction Medicine      \"This dictation was performed with voice recognition software and may contain errors,  omissions and inadvertent word substitution.\"           "

## 2024-02-12 NOTE — PROGRESS NOTES
Current condition (current mood & behavior): calm and cooperative.  Sitter present: yes  Every 15 minute documentation by NA/RN completed for Shift: yes  Room safety Suicide Checklist completed in Epic: yes  Patient's color of severity (suicide scale): YELLOW  Order for psych consult placed (if appropriate): yes  Suicide care plan added: yes      Jaida Hylton RN

## 2024-02-12 NOTE — PLAN OF CARE
SUMMARY: Admitted 1/31 for suicide ideation.   Hx: Depression, BPD, auditory hallucinations, SI, IVY  DATE & TIME: 2/12/24 1069-3859   Cognitive Concerns/ Orientation : A&O x 4, calm/flat affect   BEHAVIOR & AGGRESSION TOOL COLOR: Green. Calm and cooperative.  CIWA SCORE: NA   ABNL VS/O2: VSS on RA  MOBILITY: Independent in room  PAIN MANAGMENT:   DIET: Regular  BOWEL/BLADDER: Continent of B&B  ABNL LAB/BG: NA  DRAIN/DEVICES: None  TELEMETRY RHYTHM: NA  SKIN: Bruises to abdomen and R arm  TESTS/PROCEDURES: NA  D/C DATE: Discharge to Inpatient mental health pending  OTHER IMPORTANT INFO: Sitter at bedside. On court hold. Court hearing schedule on 2/14/24, time is to be determined. SW following.Special precautions maintained.

## 2024-02-12 NOTE — PROGRESS NOTES
[facility]    HOSPITALIST PROGRESS NOTE :   --------------------------------------------------    Date of Admission:  1/31/2024    Cumulative Summary:     Misty Parham is a 38 year old female with major depressive disorder who presented to the ED on 1/31 with suicidal ideation. She was placed on a 72 hour hold while in the ED on 2/1/24 11:47 am as symptoms were impacting her ability to safely and appropriately function in the community, and she is at high risk for death by suicide accidental or intentional due to mental health hx and substance use hx. Hospital staff have petitioned for [place] civil commitment, awaiting court determination.    Subsequently complained of sore throat, tested positive for COVID-19 infection on 2/2/24 and thus cannot discharge to inpatient psych until she is off of special precautions per report. Hence admitted to inpatient unit on 2/3/2024 under hospitalist service.      Assessment & Plan     Suicidal ideation  Borderline personality disorder  Severe episode of recurrent major depressive disorder without psychotic features  *Patient was placed on a 72 hour hold 2/1/24   *patient is placed on court hold on 02/06/24    -- Psychiatry consult requested during this stay , appreciate their help   -- Effexor dose is increased to 300 mg daily by psychiatry on 02/10/2024  -- QTC is monitored , recheck EKG on Monday   -- Continue Zyprexa 10 mg HS  -- Continue Metformin 500 mg BID, started on 2/1, to help minimize metabolic side effects related to Zyprexa  -- patient is not sleeping very well, PTA medications include 50 mg Trazadone at bedtime and Ambien 10 mg at bedtime PRN , patient has been taking ambien for long period of time   -- Trazodone will be held 3 days after Paxlovid is completed , probably can be started on 02/12/24 night  -- Her PTA TID scheduled Lorazepam was not continued initially during the hospitalization  --Once evaluated by psychiatry, psych was  concerned about her history of severe mental illness with many past psychiatric hospitalization, multiple courses of ECT including [facility] and commitments in the past.  -- Per psych they were concerned about catatonia from benzodiazepine withdrawal and have a started patient on alprazolam 1 mg p.o. 3 times daily scheduled  -- Will recommend consulting psychiatry on 02/13/2024 for follow-up.  -- Continue suicide precautions  -- Continue elopement precautions  -- /care coordinator following closely  -- Patient had vaginal examination and preliminary hearing was believed  -- Her court hearing is scheduled for 02/14, time to be determined, SW following  -- MEDICALLY CLEARED FOR DISCHARGE TO [facility] infection - tested positive 2/2/2024  Complaint of sore throat, intermittent cough.  No chest pain or palpitations  Temperature 101F, heart rate 120s at the time of admission.    *WBC 10.1.  Hemoglobin 15.3.  Creatinine 0.90.  Liver enzymes within normal limits.  CK 23, CRP 38.18.  Procalcitonin 0.18.  hCG qualitative negative.  D-dimer 0.44.  Troponin less than 6.  Chest x-ray showed no acute pathology.  Plan:  -- Patient is admitted for further evaluation and management   -- Completed Paxlovid on 02/08/24 AM  -- Continue Lovenox twice daily for DVT prophylaxis.  -- Followed CRP, downward trend     -- O2 sats are stable , no need for steroids   -- Aggressive incentive spirometry.    -- Encourage ambulation.  -- As needed antitussives.  Cepacol lozenges.    -- Magic mouthwash ordered.   -- Unable to be discharged to inpatient psych till ten day COVID isolation is completed      Tachycardia from fever, infection; resolved     -- Admission workup was negative, except positive COVID PCR as above  --  Recent TSH from December within normal limits.  --  Received IV fluids. Tachycardia improved.  --  COVID treated as above     Elevated blood pressure;   -- Patient was noticed to have slightly  "elevated blood pressure especially diastolic close to 100, patient does not have any known history of hypertension,  -- will continue to monitor , blood pressure is significantly improved    Medically complicated obesity with a BMI of 51.59.  Increase all-cause mortality and morbidity.    --consider lifestyle modification with diet and exercise as able to.     Possible JEAN PIERRE/OHS.  --Sleep studies as outpatient recommended    Clinically Significant Risk Factors                         # Severe Obesity: Estimated body mass index is 51.59 kg/m  as calculated from the following:    Height as of this encounter: 1.651 m (5' 5\").    Weight as of this encounter: 140.6 kg (310 lb).        # Financial/Environmental Concerns: none         Diet: Regular Diet Adult    Allen Catheter: Not present  DVT Prophylaxis: Enoxaparin (Lovenox) SQ  Code Status: Full Code    Patient's care was discussed with the Bedside Nurse and Patient.    Medical Decision Making     25 MINUTES SPENT BY ME on the date of service doing chart review, history, exam, documentation & further activities per the note.      Disposition Plan      Expected Discharge Date: 02/13/2024        Discharge Comments: 10 days iso from admit, then Psych.  Petition for commitment.  On a 72 hour hold as of 2/1/24. will recovered on 2/13     Entered: Glenn Nagy MD 02/12/2024, 2:21 PM       Glenn Nagy MD  Text Page (7am - 6pm)    ----------------------------------------------------------------------------------------------------------------------      Interval History     Commitment hearing tomorrow   SW following    -Data reviewed today: I reviewed all new labs and imaging results over the last 24 hours.    I personally reviewed no images or EKG's today.    Physical Exam   Temp: 97.6  F (36.4  C) Temp src: Oral BP: 124/83 Pulse: 67   Resp: 18 SpO2: 94 % O2 Device: None (Room air)    Vitals:    01/31/24 1321   Weight: 140.6 kg (310 lb)     Vital Signs with Ranges  Temp:  [97.4 "  F (36.3  C)-98.4  F (36.9  C)] 97.6  F (36.4  C)  Pulse:  [57-79] 67  Resp:  [16-18] 18  BP: (109-125)/(77-83) 124/83  SpO2:  [94 %-99 %] 94 %  I/O last 3 completed shifts:  In: 1450 [P.O.:1450]  Out: -     GENERAL: no apparent distress    Medications      ALPRAZolam  1 mg Oral TID    enoxaparin ANTICOAGULANT  40 mg Subcutaneous Q12H    metFORMIN  500 mg Oral BID w/meals    OLANZapine  10 mg Oral At Bedtime    sodium chloride (PF)  3 mL Intracatheter Q8H    venlafaxine  300 mg Oral Daily with breakfast       Data   Recent Labs   Lab 02/12/24  0841 02/09/24  1050 02/06/24  1000    318 254   CR 0.72 0.73 0.85       Imaging:   No results found for this or any previous visit (from the past 24 hour(s)).

## 2024-02-12 NOTE — PLAN OF CARE
SUMMARY: Suicide attempt with Hx of borderline personality disorder  DATE & TIME: 2/11/24 8871-7884    Cognitive Concerns/ Orientation : A&O x 4, calm/flat affect   BEHAVIOR & AGGRESSION TOOL COLOR: Green. Calm and cooperative.  CIWA SCORE: NA   ABNL VS/O2: VSS on RA  MOBILITY: Independent in room  PAIN MANAGMENT: Reported mild headache gave PRN tylenol 1x with heat packs.  DIET: Regular  BOWEL/BLADDER: Continent bladder/bowels  ABNL LAB/BG: NA  DRAIN/DEVICES: None  TELEMETRY RHYTHM: NA  SKIN: Pale. Bruises on abdomen and R arm.  TESTS/PROCEDURES: NA  D/C DATE: Discharge to Inpatient mental health pending  OTHER IMPORTANT INFO: Sitter at bedside. On court hold. Court hearing schedule on 2/14/24, time is to be determined. SW following.

## 2024-02-12 NOTE — PLAN OF CARE
Goal Outcome Evaluation:                      SUMMARY: Suicide attempt with Hx of borderline personality disorder  DATE & TIME: 2/11/24, pm shift    Cognitive Concerns/ Orientation : A&O x 4.   BEHAVIOR & AGGRESSION TOOL COLOR: Green. Calm and cooperative.  CIWA SCORE: NA   ABNL VS/O2: VSS on room air  MOBILITY: Independent in room  PAIN MANAGMENT: Reported mild headache decline pain med.  DIET: Regular  BOWEL/BLADDER: Continent bladder/bowels  ABNL LAB/BG: NA  DRAIN/DEVICES: None  TELEMETRY RHYTHM: NA  SKIN: Pale. Bruises on abdomen and R arm.  TESTS/PROCEDURES: NA  D/C DATE: Discharge to Inpatient mental health pending  OTHER IMPORTANT INFO: Sitter at bedside. On court hold. Court hearing schedule on 2/14/24, time is to be determined. SW following.

## 2024-02-13 ENCOUNTER — TELEPHONE (OUTPATIENT)
Dept: BEHAVIORAL HEALTH | Facility: CLINIC | Age: 39
End: 2024-02-13
Payer: MEDICARE

## 2024-02-13 PROCEDURE — 250N000013 HC RX MED GY IP 250 OP 250 PS 637: Performed by: PSYCHIATRY & NEUROLOGY

## 2024-02-13 PROCEDURE — 99231 SBSQ HOSP IP/OBS SF/LOW 25: CPT | Performed by: STUDENT IN AN ORGANIZED HEALTH CARE EDUCATION/TRAINING PROGRAM

## 2024-02-13 PROCEDURE — 120N000001 HC R&B MED SURG/OB

## 2024-02-13 PROCEDURE — 250N000013 HC RX MED GY IP 250 OP 250 PS 637

## 2024-02-13 PROCEDURE — 250N000013 HC RX MED GY IP 250 OP 250 PS 637: Performed by: STUDENT IN AN ORGANIZED HEALTH CARE EDUCATION/TRAINING PROGRAM

## 2024-02-13 PROCEDURE — 250N000011 HC RX IP 250 OP 636: Performed by: PHYSICIAN ASSISTANT

## 2024-02-13 PROCEDURE — 250N000013 HC RX MED GY IP 250 OP 250 PS 637: Performed by: PHYSICIAN ASSISTANT

## 2024-02-13 RX ORDER — POLYETHYLENE GLYCOL 3350 17 G/17G
17 POWDER, FOR SOLUTION ORAL 2 TIMES DAILY
Status: DISCONTINUED | OUTPATIENT
Start: 2024-02-13 | End: 2024-02-15 | Stop reason: HOSPADM

## 2024-02-13 RX ADMIN — METFORMIN HYDROCHLORIDE 500 MG: 500 TABLET ORAL at 17:47

## 2024-02-13 RX ADMIN — METFORMIN HYDROCHLORIDE 500 MG: 500 TABLET ORAL at 08:34

## 2024-02-13 RX ADMIN — POLYETHYLENE GLYCOL 3350 17 G: 17 POWDER, FOR SOLUTION ORAL at 21:41

## 2024-02-13 RX ADMIN — VENLAFAXINE HYDROCHLORIDE 300 MG: 150 CAPSULE, EXTENDED RELEASE ORAL at 08:34

## 2024-02-13 RX ADMIN — SENNOSIDES AND DOCUSATE SODIUM 2 TABLET: 50; 8.6 TABLET ORAL at 16:52

## 2024-02-13 RX ADMIN — ENOXAPARIN SODIUM 40 MG: 40 INJECTION SUBCUTANEOUS at 08:38

## 2024-02-13 RX ADMIN — OLANZAPINE 10 MG: 5 TABLET, FILM COATED ORAL at 21:41

## 2024-02-13 RX ADMIN — ALPRAZOLAM 1 MG: 0.5 TABLET ORAL at 21:41

## 2024-02-13 RX ADMIN — ENOXAPARIN SODIUM 40 MG: 40 INJECTION SUBCUTANEOUS at 16:46

## 2024-02-13 RX ADMIN — ALPRAZOLAM 1 MG: 0.5 TABLET ORAL at 08:34

## 2024-02-13 RX ADMIN — ALPRAZOLAM 1 MG: 0.5 TABLET ORAL at 16:46

## 2024-02-13 ASSESSMENT — ACTIVITIES OF DAILY LIVING (ADL)
ADLS_ACUITY_SCORE: 23

## 2024-02-13 NOTE — PROGRESS NOTES
[facility]    HOSPITALIST PROGRESS NOTE :   --------------------------------------------------    Date of Admission:  1/31/2024  Date of Service: 02/13/2024    Cumulative Summary:     Misty Parham is a 38 year old female with major depressive disorder who presented to the ED on 1/31 with suicidal ideation. She was placed on a 72 hour hold while in the ED on 2/1/24 11:47 am as symptoms were impacting her ability to safely and appropriately function in the community, and she is at high risk for death by suicide accidental or intentional due to mental health hx and substance use hx. Hospital staff have petitioned for MI civil commitment, awaiting court determination.    Subsequently complained of sore throat, tested positive for COVID-19 infection on 2/2/24 and thus cannot discharge to inpatient psych until she is off of special precautions per report. Hence admitted to inpatient unit on 2/3/2024 under hospitalist service.      Assessment & Plan     Suicidal ideation  Borderline personality disorder  Severe episode of recurrent major depressive disorder without psychotic features  *Patient was placed on a 72 hour hold 2/1/24   *patient is placed on court hold on 02/06/24    -- Psychiatry consult requested during this stay , appreciate their help   -- Effexor dose is increased to 300 mg daily by psychiatry on 02/10/2024  -- QTC is monitored , recheck EKG on Monday   -- Continue Zyprexa 10 mg HS  -- Continue Metformin 500 mg BID, started on 2/1, to help minimize metabolic side effects related to Zyprexa  -- patient is not sleeping very well, PTA medications include 50 mg Trazadone at bedtime and Ambien 10 mg at bedtime PRN , patient has been taking ambien for long period of time   -- Trazodone will be held 3 days after Paxlovid is completed , probably can be started on 02/12/24 night  -- Her PTA TID scheduled Lorazepam was not continued initially during the hospitalization  --Once evaluated by  psychiatry, psych was concerned about her history of severe mental illness with many past psychiatric hospitalization, multiple courses of ECT including [facility] and commitments in the past.  -- Per psych they were concerned about catatonia from benzodiazepine withdrawal and have a started patient on alprazolam 1 mg p.o. 3 times daily scheduled  -- Will recommend consulting psychiatry on 02/13/2024 for follow-up.  -- Continue suicide precautions  -- Continue elopement precautions  -- /care coordinator following closely  -- Patient had vaginal examination and preliminary hearing was believed  -- Her court hearing is scheduled for 02/14, time to be determined, SW following  -- MEDICALLY CLEARED FOR DISCHARGE TO MENTAL HEALTH  --Pending inpatient mental health placement     COVID-19 infection - tested positive 2/2/2024  Complaint of sore throat, intermittent cough.  No chest pain or palpitations  Temperature 101F, heart rate 120s at the time of admission.    *WBC 10.1.  Hemoglobin 15.3.  Creatinine 0.90.  Liver enzymes within normal limits.  CK 23, CRP 38.18.  Procalcitonin 0.18.  hCG qualitative negative.  D-dimer 0.44.  Troponin less than 6.  Chest x-ray showed no acute pathology.  Plan:  -- Patient is admitted for further evaluation and management   -- Completed Paxlovid on 02/08/24 AM  -- Continue Lovenox twice daily for DVT prophylaxis.  -- Followed CRP, downward trend     -- O2 sats are stable , no need for steroids   -- Aggressive incentive spirometry.    -- Encourage ambulation.  -- As needed antitussives.  Cepacol lozenges.    -- Magic mouthwash ordered.   -- Unable to be discharged to inpatient psych till ten day COVID isolation is completed      Tachycardia from fever, infection; resolved     -- Admission workup was negative, except positive COVID PCR as above  --  Recent TSH from December within normal limits.  --  Received IV fluids. Tachycardia improved.  --  COVID treated as above  "    Elevated blood pressure;   -- Patient was noticed to have slightly elevated blood pressure especially diastolic close to 100, patient does not have any known history of hypertension,  -- will continue to monitor , blood pressure is significantly improved    Medically complicated obesity with a BMI of 51.59.  Increase all-cause mortality and morbidity.    --consider lifestyle modification with diet and exercise as able to.     Possible JEAN PIERRE/OHS.  --Sleep studies as outpatient recommended    Clinically Significant Risk Factors                         # Severe Obesity: Estimated body mass index is 51.59 kg/m  as calculated from the following:    Height as of this encounter: 1.651 m (5' 5\").    Weight as of this encounter: 140.6 kg (310 lb).        # Financial/Environmental Concerns: none         Diet: Regular Diet Adult    Allen Catheter: Not present  DVT Prophylaxis: Enoxaparin (Lovenox) SQ  Code Status: Full Code    Patient's care was discussed with the Bedside Nurse and Patient.    Medical Decision Making     25 MINUTES SPENT BY ME on the date of service doing chart review, history, exam, documentation & further activities per the note.      Disposition Plan      Expected Discharge Date: 02/14/2024        Discharge Comments: 10 days iso from admit, then Psych.  Petition for commitment.  On a 72 hour hold as of 2/1/24. will recovered on 2/13     Entered: Glenn Nagy MD 02/13/2024, 4:01 PM       Glenn Nagy MD  Text Page (7am - 6pm)    ----------------------------------------------------------------------------------------------------------------------      Interval History     Commitment hearing today   SW following  Pending inpatient mental health placement     -Data reviewed today: I reviewed all new labs and imaging results over the last 24 hours.    I personally reviewed no images or EKG's today.    Physical Exam   Temp: 97.5  F (36.4  C) Temp src: Oral BP: 124/76 Pulse: 89   Resp: 18 SpO2: 98 % O2 Device: " None (Room air)    Vitals:    01/31/24 1321   Weight: 140.6 kg (310 lb)     Vital Signs with Ranges  Temp:  [97.5  F (36.4  C)-98.2  F (36.8  C)] 97.5  F (36.4  C)  Pulse:  [70-89] 89  Resp:  [16-18] 18  BP: (102-124)/(66-85) 124/76  SpO2:  [94 %-98 %] 98 %  I/O last 3 completed shifts:  In: 720 [P.O.:720]  Out: -     GENERAL: no apparent distress    Medications      ALPRAZolam  1 mg Oral TID    enoxaparin ANTICOAGULANT  40 mg Subcutaneous Q12H    metFORMIN  500 mg Oral BID w/meals    OLANZapine  10 mg Oral At Bedtime    sodium chloride (PF)  3 mL Intracatheter Q8H    venlafaxine  300 mg Oral Daily with breakfast       Data   Recent Labs   Lab 02/12/24  0841 02/09/24  1050    318   CR 0.72 0.73       Imaging:   No results found for this or any previous visit (from the past 24 hour(s)).

## 2024-02-13 NOTE — PLAN OF CARE
Goal Outcome Evaluation:      Plan of Care Reviewed With: patient    Overall Patient Progress: no changeOverall Patient Progress: no change     SUMMARY: Admitted 1/31 for suicidal ideation.   Hx: Depression, BPD, auditory hallucinations  DATE & TIME: 02/13/24 8447-5057   Cognitive Concerns/ Orientation : A&O x 4, calm; flat affect  BEHAVIOR & AGGRESSION TOOL COLOR: Green. Calm and cooperative.  ABNL VS/O2: VSS on RA  MOBILITY: Independent in room  PAIN MANAGMENT: Denies  DIET: Regular, good appetite  BOWEL/BLADDER: Continent of B&B,  ABNL LAB/BG: NA  DRAIN/DEVICES: None  SKIN: Bruises to abdomen and R arm  TESTS/PROCEDURES: NA  D/C DATE: Discharge to Inpatient mental health once a bed- central intake contacted  OTHER IMPORTANT INFO: 1:1 sitter at beside for suicide precautions. Placed on court hold 2/6. Removed from covid precautions. Pending inpatient mental health placement       Current condition (current mood & behavior): calm, flat affect  Sitter present: yes  Every 15 minute documentation by NA/RN completed for Shift: yes  Room safety Suicide Checklist completed in Epic: yes  Patient's color of severity (suicide scale): YELLOW  Order for psych consult placed (if appropriate): yes  Suicide care plan added: yes

## 2024-02-13 NOTE — PLAN OF CARE
SUMMARY: Admitted 1/31 for suicidal ideation.   Hx: Depression, BPD, auditory hallucinations  DATE & TIME: 2/12-02/13/24 Night shift   Cognitive Concerns/ Orientation : A&O x 4, calm; talking to sitter much of shift  BEHAVIOR & AGGRESSION TOOL COLOR: Green. Calm and cooperative.  ABNL VS/O2: VSS on RA  MOBILITY: Independent in room  PAIN MANAGMENT: Denies  DIET: Regular, good appetite  BOWEL/BLADDER: Continent of B&B,  ABNL LAB/BG: NA  DRAIN/DEVICES: None  SKIN: Bruises to abdomen and R arm  TESTS/PROCEDURES: NA  D/C DATE: Discharge to Inpatient mental health once a bed  OTHER IMPORTANT INFO: 1:1 sitter at beside for suicide precautions. Placed on court hold 2/6. Can be removed from Covid precautions 2/13. Pending inpatient mental health placement

## 2024-02-13 NOTE — TELEPHONE ENCOUNTER
S:  [facility] [phone] , Charge Nurse Natali  calling at 9:11 AM about a 38 year old/Female presenting with SI with a plan.      B: [facility] . Presenting problem, stressors: Pt was at [facility] and tested positive for Covid, not experiencing any Covid sx. Pt is medically recovered, but still experiencing sx of SI and MDD.    Pt affect in ED: Flat  Pt Dx: Major Depressive Disorder, Generalized Anxiety Disorder, and Borderline Personality Disorder  Previous [facility] hx? Oct 2021 and another recent, unknown time afterwards.   Pt endorses SI with a plan to overdose.    Hx of suicide attempt? Yes: Unknown  Pt denies SIB  Pt denies HI   Pt  Hx of hallucinations, none currently.  Pt RARS Score: 4    Hx of aggression/violence, sexual offenses, legal concerns, Epic care plan? describe: No  Current concerns for aggression this visit? No  Does pt have a history of Civil Commitment? Yes, most recent commitment [facility] Order in the past.  Is Pt their own guardian? Yes    Pt is prescribed medication. Is patient medication compliant?  Taking them now, had stopped for a month.  Pt endorses OP services: Psychiatrist  CD concerns: None  Acute or chronic medical concerns: Recovered Covid 2/13/24.  Does Pt present with specific needs, assistive devices, or exclusionary criteria? None      Pt is ambulatory  Pt is able to perform ADLs independently      A: Pt to be reviewed for [facility] admission. Pt is Voluntary, Court Hearing 2/14/24.  Preferred placement: Statewide    COVID Symptoms: No  If yes, COVID test required   Utox: Negative   CMP: Abnormalities: Glucose 102; Protein 6.3  CBC: WNL  HCG: Negative    R: Patient cleared and ready for behavioral bed placement: Yes Note completed: 2/12 @2:30 PM.   Pt placed on [facility] worklist? Yes    Does Patient need a Transfer Center request created? Yes, writer completed Transfer Center request at: 9:30 AM.

## 2024-02-13 NOTE — PLAN OF CARE
Goal Outcome Evaluation:    SUMMARY: Admitted 1/31 for suicidal ideation.   Hx: Depression, BPD, auditory hallucinations  DATE & TIME: 2/12/24 0642-0684   Cognitive Concerns/ Orientation : A&O x 4, calm/flat affect   BEHAVIOR & AGGRESSION TOOL COLOR: Green. Calm and cooperative.  ABNL VS/O2: VSS on RA  MOBILITY: Independent in room  PAIN MANAGMENT: denies  DIET: Regular, good appetite  BOWEL/BLADDER: Continent of B&B, PRN colace given at pt. request  ABNL LAB/BG: NA  DRAIN/DEVICES: None  SKIN: Bruises to abdomen and R arm  TESTS/PROCEDURES: NA  D/C DATE: Discharge to Inpatient mental health pending  OTHER IMPORTANT INFO: 1:1 sitter at beside for suicide precautions. Placed on court hold 2/6. Can be removed from Covid precautions 2/13. Pending inpatient mental health placement. PRN Ambien given

## 2024-02-13 NOTE — PROGRESS NOTES
Current condition (current mood & behavior): calm, flat affect  Sitter present: yes  Every 15 minute documentation by NA/RN completed for Shift: yes  Room safety Suicide Checklist completed in Epic: yes  Patient's color of severity (suicide scale): YELLOW  Order for psych consult placed (if appropriate): yes  Suicide care plan added: yes      Marisa Christianson RN

## 2024-02-13 NOTE — TELEPHONE ENCOUNTER
R: [place]  Access Inpatient Bed Call Log 2/13/24 @3:48 PM    Intake has called facilities that have not updated the bed status within the last 12 hours.                               Pt is on medical unit seeking placement statewide.     [facility] is at capacity            [facility] is posting 0 beds. [phone]    [facility] is posting 0 beds. Negative covid required              [facility] is posting 0 beds. Neg covid. No high school/[place]-psych. [phone] Per call @8:46am, possible low acuity bed   [facility] is posting 0 beds. [phone]   [facility] is posting 0 beds. [phone]    [facility] is posting 0 beds for Young Adults. Negative covid. [phone] Per call 8:28am, currently reviewing for the 1 bed they have   [facility] is posting 0 beds           [facility] is posting 3 bed [phone]       [facility] is posting 4 beds. LOW acuity ONLY. Mixed unit 12+. Negative covid- [phone]   [facility] has 3 bed posted. No aggression. Negative Covid. Low acuity    [facility] is posting 0 beds. Negative covid. [phone] Per call @8:44am, VM stating that they are at capacity   [facility]) is posting 0 beds. Low acuity only. Negative covid.  [phone]    [facility] is posting 3 beds. Low acuity. No current aggression.     [facility] ([place]) is posting 4 beds available. Negative covid.  [phone].       [facility] is posting 1 bed. Low acuity. 72 HH hold preferred. Negative covid required. [phone]    [facility] ([place] is posting 2 beds. Low acuity only. Negative covid.  [phone]    [facility] [place] is posting 5 beds.  Negative covid required.   Vol only, No history of aggression, violence, or assault. No sexual offenders. No 72 HH holds. [phone]        [facility] is posting 9 beds. Negative  covid required.  (Must have the cognitive ability to do programming. No aggressive or violent behavior or recent HX in the last 2 yrs. MH must be primary.) Always low acuity. [phone]    [facility] has 0 beds posted. Negative covid required.  Low acuity only. Violence and aggression capped.  [phone]    [facility] is posting 0 beds. Low acuity, Negative covid required. [phone] Per call @8:39am, no beds available with a few in queue for review   [facility] is posting 3 beds. Unit is a combined unit (14+). No aggressive patients. Voluntary only. Must be accompanied by a guardian.  Negative covid. [phone]     [facility] posting 4 beds Negative covid required.  [phone]    [facility] [place] is posting 0 bed. Negative covid. LOW acuity. (No lines, drains, or tubes, oxygen, CPAP, IV, etc.) Must Have a Ride Home. [phone] Per call @8:37pm, no beds now maybe later today   [facility] is posting 6 beds. Negative covid. (No. lines, drains, or tubes, oxygen, CPAP, IV, etc.). [phone] Per call 8:32am. Resource RN not available         Pt remains on the work list pending appropriate bed availability.

## 2024-02-14 ENCOUNTER — MEDICAL CORRESPONDENCE (OUTPATIENT)
Dept: HEALTH INFORMATION MANAGEMENT | Facility: CLINIC | Age: 39
End: 2024-02-14

## 2024-02-14 ENCOUNTER — TELEPHONE (OUTPATIENT)
Dept: BEHAVIORAL HEALTH | Facility: CLINIC | Age: 39
End: 2024-02-14

## 2024-02-14 PROCEDURE — 90686 IIV4 VACC NO PRSV 0.5 ML IM: CPT | Performed by: STUDENT IN AN ORGANIZED HEALTH CARE EDUCATION/TRAINING PROGRAM

## 2024-02-14 PROCEDURE — 250N000013 HC RX MED GY IP 250 OP 250 PS 637: Performed by: PSYCHIATRY & NEUROLOGY

## 2024-02-14 PROCEDURE — 99233 SBSQ HOSP IP/OBS HIGH 50: CPT | Performed by: PSYCHIATRY & NEUROLOGY

## 2024-02-14 PROCEDURE — 99231 SBSQ HOSP IP/OBS SF/LOW 25: CPT | Performed by: STUDENT IN AN ORGANIZED HEALTH CARE EDUCATION/TRAINING PROGRAM

## 2024-02-14 PROCEDURE — G0008 ADMIN INFLUENZA VIRUS VAC: HCPCS | Performed by: STUDENT IN AN ORGANIZED HEALTH CARE EDUCATION/TRAINING PROGRAM

## 2024-02-14 PROCEDURE — 250N000011 HC RX IP 250 OP 636: Performed by: PHYSICIAN ASSISTANT

## 2024-02-14 PROCEDURE — 250N000011 HC RX IP 250 OP 636: Performed by: STUDENT IN AN ORGANIZED HEALTH CARE EDUCATION/TRAINING PROGRAM

## 2024-02-14 PROCEDURE — 250N000013 HC RX MED GY IP 250 OP 250 PS 637: Performed by: STUDENT IN AN ORGANIZED HEALTH CARE EDUCATION/TRAINING PROGRAM

## 2024-02-14 PROCEDURE — 250N000013 HC RX MED GY IP 250 OP 250 PS 637

## 2024-02-14 PROCEDURE — 120N000001 HC R&B MED SURG/OB

## 2024-02-14 PROCEDURE — 250N000013 HC RX MED GY IP 250 OP 250 PS 637: Performed by: PHYSICIAN ASSISTANT

## 2024-02-14 RX ORDER — ARIPIPRAZOLE 2 MG/1
2 TABLET ORAL DAILY
Status: DISCONTINUED | OUTPATIENT
Start: 2024-02-15 | End: 2024-02-15 | Stop reason: HOSPADM

## 2024-02-14 RX ORDER — LORAZEPAM 1 MG/1
2 TABLET ORAL 3 TIMES DAILY
Status: DISCONTINUED | OUTPATIENT
Start: 2024-02-14 | End: 2024-02-15 | Stop reason: HOSPADM

## 2024-02-14 RX ADMIN — ENOXAPARIN SODIUM 40 MG: 40 INJECTION SUBCUTANEOUS at 06:41

## 2024-02-14 RX ADMIN — ALPRAZOLAM 1 MG: 0.5 TABLET ORAL at 08:48

## 2024-02-14 RX ADMIN — LORAZEPAM 2 MG: 1 TABLET ORAL at 21:03

## 2024-02-14 RX ADMIN — LORAZEPAM 2 MG: 1 TABLET ORAL at 16:22

## 2024-02-14 RX ADMIN — OLANZAPINE 10 MG: 5 TABLET, FILM COATED ORAL at 21:03

## 2024-02-14 RX ADMIN — INFLUENZA A VIRUS A/VICTORIA/4897/2022 IVR-238 (H1N1) ANTIGEN (FORMALDEHYDE INACTIVATED), INFLUENZA A VIRUS A/DARWIN/9/2021 SAN-010 (H3N2) ANTIGEN (FORMALDEHYDE INACTIVATED), INFLUENZA B VIRUS B/PHUKET/3073/2013 ANTIGEN (FORMALDEHYDE INACTIVATED), AND INFLUENZA B VIRUS B/MICHIGAN/01/2021 ANTIGEN (FORMALDEHYDE INACTIVATED) 0.5 ML: 15; 15; 15; 15 INJECTION, SUSPENSION INTRAMUSCULAR at 14:53

## 2024-02-14 RX ADMIN — METFORMIN HYDROCHLORIDE 500 MG: 500 TABLET ORAL at 17:41

## 2024-02-14 RX ADMIN — ACETAMINOPHEN 650 MG: 325 TABLET, FILM COATED ORAL at 12:30

## 2024-02-14 RX ADMIN — POLYETHYLENE GLYCOL 3350 17 G: 17 POWDER, FOR SOLUTION ORAL at 08:48

## 2024-02-14 RX ADMIN — POLYETHYLENE GLYCOL 3350 17 G: 17 POWDER, FOR SOLUTION ORAL at 21:04

## 2024-02-14 RX ADMIN — VENLAFAXINE HYDROCHLORIDE 300 MG: 150 CAPSULE, EXTENDED RELEASE ORAL at 08:48

## 2024-02-14 RX ADMIN — METFORMIN HYDROCHLORIDE 500 MG: 500 TABLET ORAL at 08:48

## 2024-02-14 ASSESSMENT — ACTIVITIES OF DAILY LIVING (ADL)
ADLS_ACUITY_SCORE: 23

## 2024-02-14 NOTE — PROGRESS NOTES
Current condition (current mood & behavior): calm/cooperative/flat/suicidal ideations  Sitter present: yes  Every 15 minute documentation by NA/RN completed for Shift: yes  Room safety Suicide Checklist completed in Epic: yes  Patient's color of severity (suicide scale): ORANGE  Order for psych consult placed (if appropriate): yes  Suicide care plan added: yes      Mireya Lundberg, RN

## 2024-02-14 NOTE — TELEPHONE ENCOUNTER
[place]: [place]  Access Inpatient Bed Call Log  2/14/24 @ 1:00am   Intake has called facilities that have not updated their bed status within the last 12 hours.??     *[place]:  [place] -- [facility]: @ cap per website.  [place] -- [facility]:  @ cap per website.  [place] -- [facility]: @ cap per website.  [place] -- [facility]: @ cap per website. Low acuity only.  [place] -- [facility]: @ cap per website.  [facility] [place] -- [facility]: @ cap per website.   [facility]/ beds - @ cap per website. Ages 18-25, Voluntary only, NO aggression/physical/sexual assault, violence hx or drug abuse, or psychosis. Negative Covid.   [place] -- [facility]: @ cap per website.  [place] -- [facility]: @ cap per website.  [place] -- [facility]: POSTING 1 BED. Not reviewing until 10AM.    *STATEWIDE (by distance):  [facility] - POSTING 4 BEDS. Mixed unit/Low acuity only.   [facility] - POSTING 1 BED. Low acuity, No aggression  [facility] -- @ cap per website.   [facility] - POSTING 3 BEDS. Low acuity only. No current aggression.  [facility] - @ cap per website. Negative Covid. Lower acuity only.  [facility] - @ cap per website. Low acuity only. Prefer med-adjustment placements.  [facility] - POSTING 2 BEDS.  No aggression.  [place] - [facility]: POSTING 1 BED. No aggressive behaviors.  [place] -- [facility]: POSTING 5 BEDS.  No hx of aggression. No sexual offenders. Voluntary patients only.  [place] -- [facility]: POSTING 9 BEDS. Low acuity only. Must be able to do programming. No aggression/violent behavior in 2 years. No CD treatment.  [place] [facility]: @ cap per website. Negative Covid test. Must be low acuity ONLY.  [place] -- [facility]: @ cap per website. Low acuity. Negative Covid.   [place] -- [facility]: POSTING 3 BEDS. Covid Neg. Voluntary only. Mixed unit; no aggression/violent  behavior. No registered sex offenders.   [place] -- [facility]: POSTING 3 BEDS. Negative Covid.   [facility]: @ [facility] per website. No hx of aggression/assault. No lines, drains or tubes. Does not provide detox or CD treatment.   [place] -- [facility]: POSTING 5 BEDS. Negative COVID. No medical devices.   **[place] -- [facility]: POSTING 13 BEDS. Out-of-State Facility.     Pt remains on waitlist pending appropriate placement availability

## 2024-02-14 NOTE — TELEPHONE ENCOUNTER
R: [place]  Access Inpatient Bed Call Log 2/14/24 0830AM  Intake has called facilities that have not updated the bed status within the last 12 hours.                               [facility] is at capacity            [facility] is posting 0 beds. [phone]    [facility] is posting 0 beds. Negative covid required              [facility] is posting 0 beds. Neg covid. No high school/[place]-psych. [phone]   [facility] is posting 0 beds. [phone]   [facility] is posting 0 beds. [phone]    [facility] is posting 0 beds           [facility] is posting 1 bed [phone]       [facility] is posting 4 beds. LOW acuity ONLY. Mixed unit 12+. Negative covid- [phone]   [facility] has  1  bed posted. No aggression. Negative Covid. Low acuity    [facility] is posting 0 beds. Negative covid. [phone]   [facility] is posting 0 beds. Low acuity only. Negative covid.  [phone]    [facility] is posting 3 beds. Low acuity. No current aggression.     [facility] ([place]) is posting  0  beds available. Negative covid.  [phone].       [facility] is posting 1 bed. Low acuity. 72 HH hold preferred. Negative covid required. [phone]    [facility] ([place] is posting 2 beds. Low acuity only. Negative covid.  [phone]    [facility] [place] is posting 5 beds.  Negative covid required.   Vol only, No history of aggression, violence, or assault. No sexual offenders. No 72 HH holds. [phone]        [facility] is posting  8  beds. Negative covid required.  (Must have the cognitive ability to do programming. No aggressive or violent behavior or recent HX in the last 2 yrs. MH must be primary.) Always low acuity. [phone]    [facility] has 0 beds posted. Negative covid required.  Low acuity only. Violence and  aggression capped.  [phone]    [facility] is posting 0 beds. Low acuity, Negative covid required. [phone]   [facility] is posting 3 beds. Unit is a combined unit (14+). No aggressive patients. Voluntary only. Must be accompanied by a guardian.  Negative covid. [phone]     [facility] 4 [facility] Negative covid required.  [phone]    [facility] is posting 0 bed. Negative covid. LOW acuity. (No lines, drains, or tubes, oxygen, CPAP, IV, etc.) Must Have a Ride Home. [phone]    [facility] is posting  5  beds. Negative covid. (No. lines, drains, or tubes, oxygen, CPAP, IV, etc.). [phone]       Pt remains on the work list pending appropriate bed availability.

## 2024-02-14 NOTE — PLAN OF CARE
Goal Outcome Evaluation:      Plan of Care Reviewed With: patient    SUMMARY: Admitted 1/31 for suicidal ideation.   Hx: Depression, BPD, auditory hallucinations  DATE & TIME: 02/13/24 [id number]  Cognitive Concerns/ Orientation : A&O x 4, calm; flat affect  BEHAVIOR & AGGRESSION TOOL COLOR: Green. Calm and cooperative.  ABNL VS/O2: VSS on RA  MOBILITY: Independent in room  PAIN MANAGMENT: Denies  DIET: Regular, good appetite  BOWEL/BLADDER: Continent of B&B,  ABNL LAB/BG: NA  DRAIN/DEVICES: None  SKIN: Bruises to abdomen and R arm  TESTS/PROCEDURES: NA  D/C DATE: Discharge to Inpatient mental health once a bed- central intake contacted  OTHER IMPORTANT INFO: 1:1 sitter at beside for suicide precautions. Placed on court hold 2/6. Removed from covid precautions. Pending inpatient mental health placement

## 2024-02-14 NOTE — PROGRESS NOTES
Initial Psychiatric Consult   Consult date: February 5, 2024         Reason for Consult, requesting source:    Reason for Consult: suicide risk assessment   Requesting source: Vikash Wyatt    Labs and imaging reviewed.     Total time spent in chart review, patient interview and coordination of care; 60 min         HPI:   39 yo woman with major depression and borderline personality disorder admitted to [facility] for suicidal ideas but admitted to med surg for COVID. [facility] had filed for commitment due to high suicide risk and the petition was upheld. Psychiatry was consulted for further psych dispo and management.     Currently in treatment with NP at [facility] (Betty Cazares NP) who diagnosed her with MDD, IVY and BPD and has her on effexor, Zyprexa ambien, trazodone and xanax. Last fill of Xanax in 12/2023 and last fill of zolpidem on 1/13/2024.     On interview today patient says she has been feeling somewhat better since restarting on her medications. She says she stopped her olanzapine about 3 months ago for weight gain and has been progressively feeling worse. She apparently texted her psychiatric NP a suicidal message and has been thinking about overdosing on trazodone.  However she now feels she is improved and that she is better off with outpatient treatment. When asked what keeps her alive despite her prior suicide attempts cites her children. Graduated from DBT program in 1/2024 which had been helpful. Currently lives with her parents.     2/9/2024: Patient seen today for follow-up in regards to question of [facility] order.  Chart reviewed.  Patient has very severe mental illness with many past psychiatric hospitalizations, multiple courses of ECT including a [facility] order with commitments in the past.  She had previously been on a milligram of Xanax 3 times daily, previously she was on Klonopin 1 mg p.o. 3 times daily.  Patient last filled on 12/30/2023 after filling regularly every month.   "Urine drug screen was negative for benzodiazepines.    Patient has a very flat affect today, and does report suicidality.  He was not able to answer questions very well.  I do wonder about some catatonia.  Will treat with scheduled Xanax, and increase patient's Effexor.    2/12/2024: Patient seen today for follow-up.  She is somewhat more talkative than she has been in the past.  She was not aware that I had put her back on Xanax, and did not know whether she felt better or not.  She does state that she takes it consistently at home.  Patient does say that she is noticing that she is talking more than she was which is different.  Then she told me that she is feeling better.  As we spoke, it became clear that the patient is actively suicidal, and thinking of ways to kill her self.  She states the last time she felt good was a couple of years ago.  She reports that if she kills her self, that she will be able to be with her children all the time because \"I will be an anmol\".  Patient appears disorganized, psychotic, and possibly somewhat catatonic.    She was able to tell me that she does not really want ECT again because it was not helpful reportedly, and also \"it caused memory problems\".  Patient also does not think any medications have helped her in the past.    2/14/2024: Patient seen today for follow-up.  She is off COVID precautions now.  Spoke with charge nurse who indicated patient is on the list for inpatient psychiatry.  Patient has a flat affect, and notes that she is \"not afraid to die\".  When I indicated that I do not think she is dying she stated \"I am ready to die\".  She appears to be responding to internal stimuli.  Although she denies auditory or visual hallucinations, she appears very distracted, and likely has significant delusional thinking.  There is a paucity of speech.  The patient is nearly catatonic, though I think she is better since being placed back on Xanax.        Past Psychiatric " "History:   Past Diagnoses: MDD, IVY, BPD   Medication Trials: zyprexa, effexor, ambien, trazodone, xanax   Past/Current Providers: Betty Cazares NP  Psychiatric Hospitalizations: multiple   Suicide Attempts: multiple         Substance Use and History:   None     Social History     Tobacco Use    Smoking status: Never    Smokeless tobacco: Never   Substance Use Topics    Alcohol use: Not Currently           Past Medical History:   PAST MEDICAL HISTORY:   Past Medical History:   Diagnosis Date    Depressive disorder     Generalized anxiety disorder 5/15/2020       PAST SURGICAL HISTORY:   Past Surgical History:   Procedure Laterality Date    CHOLECYSTECTOMY               Family History:   FAMILY HISTORY:   Family History   Problem Relation Age of Onset    Diabetes Father            Social History:     Current Living arrangement: lives with parents   Relationships: single   Children: 2 children, family member takes care of children   Occupation/Finances: unemployed   Local Support: parents          Physical ROS:   The 10 point Review of Systems is negative other than noted in the HPI or here.           Medications:      ALPRAZolam  1 mg Oral TID    [START ON 2/15/2024] influenza quadrivalent (PF) vacc  0.5 mL Intramuscular Prior to discharge    metFORMIN  500 mg Oral BID w/meals    OLANZapine  10 mg Oral At Bedtime    polyethylene glycol  17 g Oral BID    sodium chloride (PF)  3 mL Intracatheter Q8H    venlafaxine  300 mg Oral Daily with breakfast              Allergies:   No Known Allergies       Labs:     No results found for this or any previous visit (from the past 48 hour(s)).         Physical and Psychiatric Examination:     /73 (BP Location: Left arm)   Pulse 70   Temp 97.2  F (36.2  C) (Oral)   Resp 17   Ht 1.651 m (5' 5\")   Wt 140.6 kg (310 lb)   SpO2 94%   BMI 51.59 kg/m    Weight is 310 lbs 0 oz  Body mass index is 51.59 kg/m .    Physical Exam:  I have reviewed the physical exam as documented " "by by the medical team and agree with findings and assessment and have no additional findings to add at this time.         MSE:   Appearance: awake, alert  Attitude:  somewhat cooperative  Eye Contact:  poor   Mood:  \"fair\"  Affect:  intensity is flat  Speech:  clear, coherent, increased speech latency, and decreased prosody  Psychomotor Behavior:  physical retardation  Muscle strength and tone: not formally assessed   Thought Process:  evidence of thought blocking present and concrete  Associations:  loosening of associations present  Thought Content:  active suicidal ideation present, plan for suicide present, and patient appears to be responding to internal stimuli  Insight:   Poor  Judgement:  poor  Oriented to:  time, person, and place  Attention Span and Concentration:  limited  Recent and Remote Memory:  limited     EKG:          DSM-5 Diagnosis:     Benzodiazepine withdrawal delirium      Schizoaffective disorder depressive type with catatonia          Assessment:   39 yo woman with major depression and borderline personality disorder [facility] for [facility] but admitted to med surg for COVID. Petition is upheld and tentative plan is for inpatient psych admission; however much of her affective instability appears to have been driven by her character vulnerability which may be cooling off in a structured setting. Psych consult team will cont to follow and do serial reassessment for appropriateness of inpatient psych admission. Patient is a high chronic risk of suicide given her MDD, borderline character construct, prior suicide attempts.    2/9/2024: Patient has a history of very severe depression with psychosis, requiring ECT under commitment in the past.  She has had multiple courses of ECT, psychiatric hospitalizations, etc.  Current psychiatric formulation includes major depressive disorder with psychosis.  I do wonder if this is a missed diagnosis, and the patient may have a primary " psychotic/affective disorder, i.e. schizoaffective disorder depressive type.    Additionally, patient is likely in benzodiazepine withdrawal.  She was regularly receiving Klonopin 1 mg #90, filling monthly.  She was then transition to Xanax 1 mg #90, also filling monthly.  This was last filled 12/30/2023.  Patient had a urine drug screen negative for benzodiazepines when she presented.    2/12/2024: Patient remains very psychiatrically decompensated.  As stated before, the patient may be misdiagnosed.  She appears to have a primary psychotic disorder to me, and clearly has very significant affect of symptoms.  She thinks of killing herself and how to kill her self, because quality of life is so bad related to her mental health symptoms.  I do believe she is psychotic, and is withholding probable hallucinations and delusional thinking.  I do not think this patient has a personality disorder per se, it is more likely that she has a very severe mental illness, and limited coping skills in that setting.    2/14/2024: Patient continues to be quite ill psychiatrically.  I recommended a full commitment.  She has had multiple runs of ECT in the past.  I do think she is misdiagnosed, and seems to have a primary psychotic disorder.  I am reframing the formulation as schizoaffective disorder depressive type.  She is also catatonic.          Summary of Recommendations:     Have discontinued Xanax and replaced with equivalent Ativan dosing of 2 mg p.o. 3 times daily.  Will titrate as appropriate for treatment of catatonia.    Please continue with sitter.  Patient is actively suicidal and has a plan to choke herself or strangle herself with cords in the room.    Patient has not had no improvement on increased Effexor dose.  Will decrease back to 225 mg a day.    Have initiated Abilify 2 mg p.o. daily starting tomorrow.    Patient is awaiting inpatient psychiatry.  She may require ECT again, though she does not want it.  I will  "not initiate a petition for emergency ECT, because this requires either medical director or the chief to perform the examination.          Genevieve Zamarripa MD    Consult Liaison Psychiatry/Addiction Medicine      \"This dictation was performed with voice recognition software and may contain errors,  omissions and inadvertent word substitution.\"           "

## 2024-02-14 NOTE — PROGRESS NOTES
Care Management Follow Up    Length of Stay (days): 11    Expected Discharge Date: 02/15/2024     Concerns to be Addressed: discharge planning     Patient plan of care discussed at interdisciplinary rounds: Yes    Anticipated Discharge Disposition: Inpatient Mental Health     Anticipated Discharge Services:    Anticipated Discharge DME:      Patient/family educated on Medicare website which has current facility and service quality ratings:    Education Provided on the Discharge Plan:    Patient/Family in Agreement with the Plan:      Referrals Placed by /: [facility]  Private pay costs discussed: Not applicable    Additional Information:  Patient participated in her virtual MI Hearing today for MI Commitment.  Awaiting the Court Decision.    SAYRA PatiñoSW

## 2024-02-14 NOTE — PLAN OF CARE
Goal Outcome Evaluation:      Plan of Care Reviewed With: patient    Overall Patient Progress: no changeOverall Patient Progress: no change     SUMMARY: Admitted 1/31 for suicidal ideation.   Hx: Depression, BPD, auditory hallucinations  DATE & TIME: 02/13-02/14 1930-0730  Cognitive Concerns/ Orientation : A&O x 4, calm; flat affect  BEHAVIOR & AGGRESSION TOOL COLOR: Green  ABNL VS/O2: VSS on RA  MOBILITY: Independent in room  PAIN MANAGMENT: Denies  DIET: Regular   BOWEL/BLADDER: Continent of B&B,  ABNL LAB/BG: NA  DRAIN/DEVICES: None  SKIN: Bruises to abdomen and R arm  TESTS/PROCEDURES: NA  D/C DATE: Discharge to Inpatient mental health once a bed- central intake contacted  OTHER IMPORTANT INFO: 1:1 sitter at beside for suicide precautions. Placed on court hold 2/6. Pending inpatient mental health placement. Patient endorses suicidal thoughts w/ a plan of self strangulation. Court hearing scheduled for today-  following. Covid recovered.

## 2024-02-14 NOTE — PROGRESS NOTES
IP MH Referral Acuity Rating Score (RARS)     LMHP complete at referral to IP MH, with DEC; and, daily while awaiting IP MH placement. Call score to PPS.  CRITERIA SCORING   New 70 HH and Involuntary for IP MH (not adolescent) 1/1   Boarding over 24 hours 1/1   Vulnerable adult at least 55+ with multiple co morbidities; or, Patient age 11 or under 0/1   Suicide ideation without relief of precipitating factors 1/1   Current plan for suicide 1/1   Current plan for homicide 0/1   Imminent risk or actual attempt to seriously harm another without relief of factors precipitating the attempt 0/1   Severe dysfunction in daily living (ex: complete neglect for self care, extreme disruption in vegetative function, extreme deterioration in social interactions) 1/1   Recent (last 2 weeks) or current physical aggression in the ED 0/1   Restraints or seclusion episode in ED 0/1   Verbal aggression, agitation, yelling, etc., while in the ED 0/1   Active psychosis with psychomotor agitation or catatonia 0/1   Need for constant or near constant redirection (from leaving, from others, etc).  0/1   Intrusive or disruptive behaviors 0/1   TOTAL Acuity Total Score: 5

## 2024-02-14 NOTE — PLAN OF CARE
Goal Outcome Evaluation:      Plan of Care Reviewed With: patient      SUMMARY: Admitted 1/31 for suicidal ideation.   Hx: Depression, BPD, auditory hallucinations  DATE & TIME: 02/14/24 4271-6095  Cognitive Concerns/ Orientation : A&O x 4, calm; flat affect  BEHAVIOR & AGGRESSION TOOL COLOR: Green  ABNL VS/O2: VSS on RA  MOBILITY: Independent in room  PAIN MANAGMENT: HA this am, given PRN tylenol.  DIET: Regular   BOWEL/BLADDER: Continent of B&B,  ABNL LAB/BG: NA  DRAIN/DEVICES: None  SKIN: Bruises to abdomen and R arm  TESTS/PROCEDURES: NA  D/C DATE: Discharge to Inpatient mental health once a bed- central intake contacted  OTHER IMPORTANT INFO: 1:1 sitter at beside for suicide precautions. Placed on court hold 2/6. Pending inpatient mental health placement. Patient endorses suicidal thoughts w/ a plan of self strangulation. Court hearing held for today 1500- SW following. Covid recovered.      Current condition (current mood & behavior): Calm, flat affect, suicidal ideations  Sitter present: yes  Every 15 minute documentation by NA/RN completed for Shift: yes  Room safety Suicide Checklist completed in Epic: yes  Patient's color of severity (suicide scale): ORANGE  Order for psych consult placed (if appropriate): yes  Suicide care plan added: yes

## 2024-02-14 NOTE — PROGRESS NOTES
Current condition (current mood & behavior): Calm, flat affect, suicidal ideations  Sitter present: yes  Every 15 minute documentation by NA/RN completed for Shift: yes  Room safety Suicide Checklist completed in Epic: yes  Patient's color of severity (suicide scale): ORANGE  Order for psych consult placed (if appropriate): yes  Suicide care plan added: yes      Marisa Christianson RN

## 2024-02-14 NOTE — PROGRESS NOTES
[facility]    HOSPITALIST PROGRESS NOTE :   --------------------------------------------------    Date of Admission:  1/31/2024  Date of Service: 02/14/2024    Cumulative Summary:     Misty Parham is a 38 year old female with major depressive disorder who presented to the ED on 1/31 with suicidal ideation. She was placed on a 72 hour hold while in the ED on 2/1/24 11:47 am as symptoms were impacting her ability to safely and appropriately function in the community, and she is at high risk for death by suicide accidental or intentional due to mental health hx and substance use hx. Hospital staff have petitioned for MI civil commitment, awaiting court determination.    Subsequently complained of sore throat, tested positive for COVID-19 infection on 2/2/24 and thus cannot discharge to inpatient psych until she is off of special precautions per report. Hence admitted to inpatient unit on 2/3/2024 under hospitalist service.      Assessment & Plan     Suicidal ideation  Borderline personality disorder  Severe episode of recurrent major depressive disorder without psychotic features  *Patient was placed on a 72 hour hold 2/1/24   *patient is placed on court hold on 02/06/24    -- Psychiatry consult requested during this stay , appreciate their help   -- Effexor dose is increased to 300 mg daily by psychiatry on 02/10/2024  -- QTC is monitored , recheck EKG on Monday   -- Continue Zyprexa 10 mg HS  -- Continue Metformin 500 mg BID, started on 2/1, to help minimize metabolic side effects related to Zyprexa  -- patient is not sleeping very well, PTA medications include 50 mg Trazadone at bedtime and Ambien 10 mg at bedtime PRN , patient has been taking ambien for long period of time   -- Trazodone will be held 3 days after Paxlovid is completed , probably can be started on 02/12/24 night  -- Her PTA TID scheduled Lorazepam was not continued initially during the hospitalization  --Once evaluated by  psychiatry, psych was concerned about her history of severe mental illness with many past psychiatric hospitalization, multiple courses of ECT including [facility] and commitments in the past.  -- Per psych they were concerned about catatonia from benzodiazepine withdrawal and have a started patient on alprazolam 1 mg p.o. 3 times daily scheduled  -- Will recommend consulting psychiatry on 02/13/2024 for follow-up.  -- Continue suicide precautions  -- Continue elopement precautions  -- /care coordinator following closely  -- Patient had vaginal examination and preliminary hearing was believed  -- Her court hearing was on 02/14  -- MEDICALLY CLEARED FOR DISCHARGE TO MENTAL HEALTH  --Pending inpatient mental health placement     COVID-19 infection - tested positive 2/2/2024  Complaint of sore throat, intermittent cough.  No chest pain or palpitations  Temperature 101F, heart rate 120s at the time of admission.    *WBC 10.1.  Hemoglobin 15.3.  Creatinine 0.90.  Liver enzymes within normal limits.  CK 23, CRP 38.18.  Procalcitonin 0.18.  hCG qualitative negative.  D-dimer 0.44.  Troponin less than 6.  Chest x-ray showed no acute pathology.  Plan:  -- Patient is admitted for further evaluation and management   -- Completed Paxlovid on 02/08/24 AM  -- Continue Lovenox twice daily for DVT prophylaxis.  -- Followed CRP, downward trend     -- O2 sats are stable , no need for steroids   -- Aggressive incentive spirometry.    -- Encourage ambulation.  -- As needed antitussives.  Cepacol lozenges.    -- Magic mouthwash ordered.   -- Unable to be discharged to inpatient psych till ten day COVID isolation is completed      Tachycardia from fever, infection; resolved     -- Admission workup was negative, except positive COVID PCR as above  --  Recent TSH from December within normal limits.  --  Received IV fluids. Tachycardia improved.  --  COVID treated as above     Elevated blood pressure;   -- Patient was  "noticed to have slightly elevated blood pressure especially diastolic close to 100, patient does not have any known history of hypertension,  -- will continue to monitor , blood pressure is significantly improved    Medically complicated obesity with a BMI of 51.59.  Increase all-cause mortality and morbidity.    --consider lifestyle modification with diet and exercise as able to.     Possible JEAN PIERRE/OHS.  --Sleep studies as outpatient recommended    Clinically Significant Risk Factors                         # Severe Obesity: Estimated body mass index is 51.59 kg/m  as calculated from the following:    Height as of this encounter: 1.651 m (5' 5\").    Weight as of this encounter: 140.6 kg (310 lb).        # Financial/Environmental Concerns: none         Diet: Regular Diet Adult    Allen Catheter: Not present  DVT Prophylaxis: Enoxaparin (Lovenox) SQ  Code Status: Full Code    Patient's care was discussed with the Bedside Nurse and Patient.    Medical Decision Making     25 MINUTES SPENT BY ME on the date of service doing chart review, history, exam, documentation & further activities per the note.      Disposition Plan      Expected Discharge Date: 02/15/2024        Discharge Comments: 10 days iso from admit, then Psych.  Petition for commitment.  On a 72 hour hold as of 2/1/24. will recovered on 2/13     Entered: Glenn Nagy MD 02/14/2024, 1:16 PM       Glenn Nagy MD  Text Page (7am - 6pm)    ----------------------------------------------------------------------------------------------------------------------      Interval History     No acute events overnight  Continues to need a sitter  SW following  Pending inpatient mental health placement   Per RN report -> Patient endorses suicidal thoughts w/ a plan of self strangulation.     -Data reviewed today: I reviewed all new labs and imaging results over the last 24 hours.    I personally reviewed no images or EKG's today.    Physical Exam   Temp: 97.2  F (36.2  C) " Temp src: Oral BP: 108/73 Pulse: 70   Resp: 17 SpO2: 94 % O2 Device: None (Room air)    Vitals:    01/31/24 1321   Weight: 140.6 kg (310 lb)     Vital Signs with Ranges  Temp:  [97.2  F (36.2  C)-98.4  F (36.9  C)] 97.2  F (36.2  C)  Pulse:  [70-85] 70  Resp:  [17-18] 17  BP: (108-138)/(66-87) 108/73  SpO2:  [94 %] 94 %  I/O last 3 completed shifts:  In: 1400 [P.O.:1400]  Out: -     GENERAL: no apparent distress    Medications      [START ON 2/15/2024] ARIPiprazole  2 mg Oral Daily    [START ON 2/15/2024] influenza quadrivalent (PF) vacc  0.5 mL Intramuscular Prior to discharge    LORazepam  2 mg Oral TID    metFORMIN  500 mg Oral BID w/meals    OLANZapine  10 mg Oral At Bedtime    polyethylene glycol  17 g Oral BID    sodium chloride (PF)  3 mL Intracatheter Q8H    [START ON 2/15/2024] venlafaxine  225 mg Oral Daily with breakfast       Data   Recent Labs   Lab 02/12/24  0841 02/09/24  1050    318   CR 0.72 0.73       Imaging:   No results found for this or any previous visit (from the past 24 hour(s)).

## 2024-02-15 ENCOUNTER — TELEPHONE (OUTPATIENT)
Dept: BEHAVIORAL HEALTH | Facility: CLINIC | Age: 39
End: 2024-02-15
Payer: MEDICARE

## 2024-02-15 VITALS
RESPIRATION RATE: 18 BRPM | HEIGHT: 65 IN | WEIGHT: 293 LBS | OXYGEN SATURATION: 99 % | SYSTOLIC BLOOD PRESSURE: 167 MMHG | TEMPERATURE: 97.9 F | HEART RATE: 88 BPM | BODY MASS INDEX: 48.82 KG/M2 | DIASTOLIC BLOOD PRESSURE: 92 MMHG

## 2024-02-15 LAB
CREAT SERPL-MCNC: 0.85 MG/DL (ref 0.51–0.95)
EGFRCR SERPLBLD CKD-EPI 2021: 88 ML/MIN/1.73M2
PLATELET # BLD AUTO: 356 10E3/UL (ref 150–450)

## 2024-02-15 PROCEDURE — 250N000013 HC RX MED GY IP 250 OP 250 PS 637: Performed by: STUDENT IN AN ORGANIZED HEALTH CARE EDUCATION/TRAINING PROGRAM

## 2024-02-15 PROCEDURE — 250N000013 HC RX MED GY IP 250 OP 250 PS 637: Performed by: HOSPITALIST

## 2024-02-15 PROCEDURE — 82565 ASSAY OF CREATININE: CPT | Performed by: PHYSICIAN ASSISTANT

## 2024-02-15 PROCEDURE — 250N000013 HC RX MED GY IP 250 OP 250 PS 637: Performed by: PSYCHIATRY & NEUROLOGY

## 2024-02-15 PROCEDURE — 99239 HOSP IP/OBS DSCHRG MGMT >30: CPT | Performed by: HOSPITALIST

## 2024-02-15 PROCEDURE — 85049 AUTOMATED PLATELET COUNT: CPT | Performed by: PHYSICIAN ASSISTANT

## 2024-02-15 PROCEDURE — 36415 COLL VENOUS BLD VENIPUNCTURE: CPT | Performed by: PHYSICIAN ASSISTANT

## 2024-02-15 RX ORDER — HYDROXYZINE HYDROCHLORIDE 25 MG/1
25 TABLET, FILM COATED ORAL EVERY 4 HOURS PRN
Status: CANCELLED | OUTPATIENT
Start: 2024-02-15

## 2024-02-15 RX ORDER — AMOXICILLIN 250 MG
1 CAPSULE ORAL 2 TIMES DAILY PRN
Status: CANCELLED | OUTPATIENT
Start: 2024-02-15

## 2024-02-15 RX ORDER — ARIPIPRAZOLE 2 MG/1
2 TABLET ORAL DAILY
Status: CANCELLED | OUTPATIENT
Start: 2024-02-16

## 2024-02-15 RX ORDER — ACETAMINOPHEN 325 MG/1
650 TABLET ORAL EVERY 4 HOURS PRN
Status: CANCELLED | OUTPATIENT
Start: 2024-02-15

## 2024-02-15 RX ORDER — GUAIFENESIN 600 MG/1
600 TABLET, EXTENDED RELEASE ORAL 2 TIMES DAILY PRN
Status: CANCELLED | OUTPATIENT
Start: 2024-02-15

## 2024-02-15 RX ORDER — DIPHENHYDRAMINE HYDROCHLORIDE AND LIDOCAINE HYDROCHLORIDE AND ALUMINUM HYDROXIDE AND MAGNESIUM HYDRO
10 KIT EVERY 6 HOURS PRN
Status: CANCELLED | OUTPATIENT
Start: 2024-02-15

## 2024-02-15 RX ORDER — POLYETHYLENE GLYCOL 3350 17 G/17G
17 POWDER, FOR SOLUTION ORAL 2 TIMES DAILY
Status: CANCELLED | OUTPATIENT
Start: 2024-02-15

## 2024-02-15 RX ORDER — CALCIUM CARBONATE 500 MG/1
1000 TABLET, CHEWABLE ORAL 4 TIMES DAILY PRN
Status: CANCELLED | OUTPATIENT
Start: 2024-02-15

## 2024-02-15 RX ORDER — OLANZAPINE 5 MG/1
10 TABLET ORAL 3 TIMES DAILY PRN
Status: DISCONTINUED | OUTPATIENT
Start: 2024-02-15 | End: 2024-02-15 | Stop reason: HOSPADM

## 2024-02-15 RX ORDER — OLANZAPINE 10 MG/1
10 TABLET ORAL 3 TIMES DAILY PRN
Status: CANCELLED | OUTPATIENT
Start: 2024-02-15

## 2024-02-15 RX ORDER — OLANZAPINE 10 MG/2ML
10 INJECTION, POWDER, FOR SOLUTION INTRAMUSCULAR 3 TIMES DAILY PRN
Status: CANCELLED | OUTPATIENT
Start: 2024-02-15

## 2024-02-15 RX ORDER — OLANZAPINE 5 MG/1
10 TABLET ORAL AT BEDTIME
Status: CANCELLED | OUTPATIENT
Start: 2024-02-15

## 2024-02-15 RX ORDER — OLANZAPINE 10 MG/2ML
10 INJECTION, POWDER, FOR SOLUTION INTRAMUSCULAR 3 TIMES DAILY PRN
Status: DISCONTINUED | OUTPATIENT
Start: 2024-02-15 | End: 2024-02-15 | Stop reason: HOSPADM

## 2024-02-15 RX ORDER — HYDROXYZINE HYDROCHLORIDE 25 MG/1
25 TABLET, FILM COATED ORAL EVERY 4 HOURS PRN
Status: DISCONTINUED | OUTPATIENT
Start: 2024-02-15 | End: 2024-02-15 | Stop reason: HOSPADM

## 2024-02-15 RX ORDER — LANOLIN ALCOHOL/MO/W.PET/CERES
3 CREAM (GRAM) TOPICAL
Status: CANCELLED | OUTPATIENT
Start: 2024-02-15

## 2024-02-15 RX ORDER — MAGNESIUM HYDROXIDE/ALUMINUM HYDROXICE/SIMETHICONE 120; 1200; 1200 MG/30ML; MG/30ML; MG/30ML
30 SUSPENSION ORAL EVERY 4 HOURS PRN
Status: DISCONTINUED | OUTPATIENT
Start: 2024-02-15 | End: 2024-02-15 | Stop reason: HOSPADM

## 2024-02-15 RX ORDER — LIDOCAINE 40 MG/G
CREAM TOPICAL
Status: CANCELLED | OUTPATIENT
Start: 2024-02-15

## 2024-02-15 RX ORDER — ONDANSETRON 4 MG/1
4 TABLET, ORALLY DISINTEGRATING ORAL EVERY 6 HOURS PRN
Status: CANCELLED | OUTPATIENT
Start: 2024-02-15

## 2024-02-15 RX ORDER — LORAZEPAM 1 MG/1
2 TABLET ORAL 3 TIMES DAILY
Status: CANCELLED | OUTPATIENT
Start: 2024-02-15

## 2024-02-15 RX ORDER — ZOLPIDEM TARTRATE 5 MG/1
10 TABLET ORAL
Status: CANCELLED | OUTPATIENT
Start: 2024-02-15

## 2024-02-15 RX ORDER — DOCUSATE SODIUM 100 MG/1
100 CAPSULE, LIQUID FILLED ORAL 2 TIMES DAILY PRN
Status: CANCELLED | OUTPATIENT
Start: 2024-02-15

## 2024-02-15 RX ORDER — ACETAMINOPHEN 650 MG/1
650 SUPPOSITORY RECTAL EVERY 4 HOURS PRN
Status: CANCELLED | OUTPATIENT
Start: 2024-02-15

## 2024-02-15 RX ORDER — ONDANSETRON 2 MG/ML
4 INJECTION INTRAMUSCULAR; INTRAVENOUS EVERY 6 HOURS PRN
Status: CANCELLED | OUTPATIENT
Start: 2024-02-15

## 2024-02-15 RX ORDER — LANOLIN ALCOHOL/MO/W.PET/CERES
3 CREAM (GRAM) TOPICAL
Status: DISCONTINUED | OUTPATIENT
Start: 2024-02-15 | End: 2024-02-15 | Stop reason: HOSPADM

## 2024-02-15 RX ORDER — AMOXICILLIN 250 MG
2 CAPSULE ORAL 2 TIMES DAILY PRN
Status: CANCELLED | OUTPATIENT
Start: 2024-02-15

## 2024-02-15 RX ORDER — MAGNESIUM HYDROXIDE/ALUMINUM HYDROXICE/SIMETHICONE 120; 1200; 1200 MG/30ML; MG/30ML; MG/30ML
30 SUSPENSION ORAL EVERY 4 HOURS PRN
Status: CANCELLED | OUTPATIENT
Start: 2024-02-15

## 2024-02-15 RX ORDER — BISACODYL 5 MG
5 TABLET, DELAYED RELEASE (ENTERIC COATED) ORAL DAILY PRN
Status: CANCELLED | OUTPATIENT
Start: 2024-02-15

## 2024-02-15 RX ORDER — ACETAMINOPHEN 325 MG/1
650 TABLET ORAL EVERY 4 HOURS PRN
Status: DISCONTINUED | OUTPATIENT
Start: 2024-02-15 | End: 2024-02-15

## 2024-02-15 RX ORDER — BENZONATATE 100 MG/1
100 CAPSULE ORAL 3 TIMES DAILY PRN
Status: CANCELLED | OUTPATIENT
Start: 2024-02-15

## 2024-02-15 RX ADMIN — ARIPIPRAZOLE 2 MG: 2 TABLET ORAL at 08:20

## 2024-02-15 RX ADMIN — LORAZEPAM 2 MG: 1 TABLET ORAL at 08:20

## 2024-02-15 RX ADMIN — ACETAMINOPHEN 650 MG: 325 TABLET, FILM COATED ORAL at 21:17

## 2024-02-15 RX ADMIN — LORAZEPAM 2 MG: 1 TABLET ORAL at 21:16

## 2024-02-15 RX ADMIN — METFORMIN HYDROCHLORIDE 500 MG: 500 TABLET ORAL at 08:20

## 2024-02-15 RX ADMIN — LORAZEPAM 2 MG: 1 TABLET ORAL at 15:46

## 2024-02-15 RX ADMIN — OLANZAPINE 10 MG: 5 TABLET, FILM COATED ORAL at 21:16

## 2024-02-15 RX ADMIN — METFORMIN HYDROCHLORIDE 500 MG: 500 TABLET ORAL at 17:07

## 2024-02-15 RX ADMIN — POLYETHYLENE GLYCOL 3350 17 G: 17 POWDER, FOR SOLUTION ORAL at 08:20

## 2024-02-15 RX ADMIN — VENLAFAXINE HYDROCHLORIDE 225 MG: 150 CAPSULE, EXTENDED RELEASE ORAL at 08:20

## 2024-02-15 ASSESSMENT — ACTIVITIES OF DAILY LIVING (ADL)
ADLS_ACUITY_SCORE: 23

## 2024-02-15 NOTE — TELEPHONE ENCOUNTER
R: [place]  Access Inpatient Bed Call Log 2/14/24 @3:12 PM    Intake has called facilities that have not updated the bed status within the last 12 hours.                                 [facility] is at capacity             [facility] is posting 0 beds. [phone]     [facility] is posting 0 beds. Negative covid required               [facility] is posting 0 beds. Neg covid. No high school/[place]-psych. [phone]    [place] is posting 0 beds. [phone]    [facility] is posting 0 beds. [phone]     [facility] is posting 3 beds for Young Adults aged 18-26. Negative covid. [phone]    [facility] is posting 0 beds            [facility] is posting 1 bed [phone].  Per call with [place], at capacity.       [facility] is posting 4 beds. LOW acuity ONLY. Mixed unit 12+. Negative covid- [phone]    [facility] has  1  bed posted. No aggression. Negative Covid. Low acuity     [facility] is posting 0 beds. Negative covid. [phone]    [facility] is posting 0 beds. Low acuity only. Negative covid.  [phone]     [facility] is posting 3 beds. Low acuity. No current aggression.      [facility] ([place]) is posting  3  beds available. Negative covid.  [phone].        [facility] is posting 1 bed. Low acuity. 72 HH hold preferred. Negative covid required. [phone]     [facility] ([place]) is posting 2 beds. Low acuity only. Negative covid.  [phone]     [facility] [place] is posting 7 beds.  Negative covid required.   Vol only, No history of aggression, violence, or assault. No sexual offenders. No 72 HH holds. [phone]          [facility] is posting  8  beds. Negative covid required.  (Must have the cognitive ability to do programming. No aggressive or violent behavior or recent HX in the last 2 yrs.   must be primary.) Always low acuity. [phone]     [facility] has 0 beds posted. Negative covid required.  Low acuity only. Violence and aggression capped.  [phone]     [facility] is posting 0 beds. Low acuity, Negative covid required. [phone]    [facility] is posting 3 beds. Unit is a combined unit (14+). No aggressive patients. Voluntary only. Must be accompanied by a guardian.  Negative covid. [phone]      [facility] posting 3 beds Negative covid required.  [phone]     [facility] is posting 0 bed. Negative covid. LOW acuity. (No lines, drains, or tubes, oxygen, CPAP, IV, etc.) Must Have a Ride Home. [phone]     [facility] is posting  5  beds. Negative covid. (No. lines, drains, or tubes, oxygen, CPAP, IV, etc.). [phone]    [facility] is posting 12 beds. No covid test required. [phone]          Pt remains on the work list pending appropriate bed availability.       R: Pt currently being reviewed for placement with [facility].  Information faxed. Awaiting call back    R: 11:00 PM pt declined by [facility] due to awaiting MI commitment court decision and not yet knowing commitment status.  Pt remains on PPS worklist awaiting appropriate placement.

## 2024-02-15 NOTE — PLAN OF CARE
"Goal Outcome Evaluation:      Plan of Care Reviewed With: patient      SUMMARY: Admitted 1/31 for suicidal ideation.   Hx: Depression, BPD, auditory hallucinations  DATE & TIME: 02/14/24 6704-1066  Cognitive Concerns/ Orientation : A&O x 4, calm; flat affect  BEHAVIOR & AGGRESSION TOOL COLOR: Green  ABNL VS/O2: VSS on RA  MOBILITY: Independent in room  PAIN MANAGMENT: denies   DIET: Regular   BOWEL/BLADDER: Continent of B&B,  ABNL LAB/BG: NA  DRAIN/DEVICES: None, no iv access   SKIN: Bruises to abdomen and R arm  TESTS/PROCEDURES: NA  D/C DATE: Discharge to Inpatient mental health pending bed availability.   OTHER IMPORTANT INFO: 1:1 sitter at beside for suicide precautions. Placed on court hold 2/6. Pending inpatient mental health placement. Patient continue to endorse suicidal thoughts w/ a plan of self strangulation. Pt stated \"I'm done with life\". Court hearing held today 1500- SW following. Covid recovered on 2/13.         Current condition (current mood & behavior): Calm, flat affect, actively suicidal   Sitter present: yes  Every 15 minute documentation by NA/RN completed for Shift: yes  Room safety Suicide Checklist completed in Epic: yes  Patient's color of severity (suicide scale): ORANGE  Order for psych consult placed (if appropriate): yes  Suicide care plan added: yes               "

## 2024-02-15 NOTE — TELEPHONE ENCOUNTER
R:  IntaKE Called Karina @ 7:35am to review for [facility].      called intake back @ 8:44aM and Pt accepted for [facility] to [place].    Pt placed in units queue @ 8:45am    Email sent to registration @ 8:54am  Pt added to admit board.     [facility] updated with placement @ 8:55am    Intake called [facility] @ 8:38 AM to inquire of Dr following Pt today.  Dr is Dr Arnaud Ferreira.  Call number received for Dr to  if [place] requests.        Indicia Completed:

## 2024-02-15 NOTE — PLAN OF CARE
Goal Outcome Evaluation:  SUMMARY: Admitted 1/31 for suicidal ideation.   Hx: Depression, BPD, auditory hallucinations  DATE & TIME: 02/14/24 6884-3088  Cognitive Concerns/ Orientation : A&O x 4, calm; flat affect  BEHAVIOR & AGGRESSION TOOL COLOR: Green  ABNL VS/O2: VSS on RA except hypertension  MOBILITY: Independent in room  PAIN MANAGMENT: denies   DIET: Regular   BOWEL/BLADDER: Continent of B&B,  ABNL LAB/BG: NA  DRAIN/DEVICES: No IV access  SKIN: Bruises to abdomen and R arm  TESTS/PROCEDURES: NA  D/C DATE: Discharge to Inpatient mental health pending bed availability.   OTHER IMPORTANT INFO: 1:1 sitter at beside for suicide precautions. Placed on court hold 2/6. Pending inpatient mental health placement. Patient continue to endorse suicidal thoughts w/ a plan. Court hearing held on 2/14-SW following. Covid recovered on 2/13.     Current condition (current mood & behavior): Calm, flat affect, actively suicidal.  Sitter present: yes  Every 15 minute documentation by NA/RN completed for Shift: yes  Room safety Suicide Checklist completed in Epic: yes  Patient's color of severity (suicide scale): RED  Order for psych consult placed (if appropriate): yes  Suicide care plan added: yes

## 2024-02-15 NOTE — PROGRESS NOTES
Called [facility] transport [phone] to setup ride to  [place] unit 5 [facility] the earliest ride will be 2/15 at 2000

## 2024-02-15 NOTE — PROGRESS NOTES
"Care Management Discharge Note    Discharge Date: 02/21/2024       Discharge Disposition: Inpatient Mental Health    Discharge Services:      Discharge DME:      Discharge Transportation:      Private pay costs discussed:     Does the patient's insurance plan have a 3 day qualifying hospital stay waiver?      PAS Confirmation Code:  n/a  Patient/family educated on Medicare website which has current facility and service quality ratings:      Education Provided on the Discharge Plan:  yes  Persons Notified of Discharge Plans: patient   Patient/Family in Agreement with the Plan:  no    Handoff Referral Completed: No    Additional Information:  This afternoon we received the [facility] Court Order for Commitment as a Person Who Poses A Risk of Harm Due to A Mental Illness.  Writer gave patient a copy and said this document gives the hospital the authority to transfer her to an In Patient Psychiatry Unit per the [facility] Attorney Damián Long.  Patient took the document and layed it down. Writer asked patient if she has any questions and she said \"no\".  Writer contacted the [facility]  and found out patient is now going to [address] [place] [address] [phone].Called  staff and explained the Court Order has been signed thru [facility] and faxed a copy to [phone].   Ms Ethan also said our hospital does not complete a Provisional Discharge rather It is a transfer to another facility that has an open inpatient psych bed. Based on this direction a PD was not completed.  Tomorrow morning she directed writer to file this information of patient's transfer to [facility].      Addendum;  Letter filed to [facility] Attorney office to notify them of patient's transfer. Faxed to [phone]      HAIM Patiño     "

## 2024-02-15 NOTE — PLAN OF CARE
SUMMARY: Admitted 1/31 for suicidal ideation.   Hx: Depression, BPD, auditory hallucinations  DATE & TIME: 02/14/24 1930-0730  Cognitive Concerns/ Orientation : A&O x 4, calm; flat affect  BEHAVIOR & AGGRESSION TOOL COLOR: Green  ABNL VS/O2: VSS on RA  MOBILITY: Independent in room  PAIN MANAGMENT: denies   DIET: Regular   BOWEL/BLADDER: Continent of B&B,  ABNL LAB/BG: NA  DRAIN/DEVICES: No IV access  SKIN: Bruises to abdomen and R arm  TESTS/PROCEDURES: NA  D/C DATE: Discharge to Inpatient mental health pending bed availability.   OTHER IMPORTANT INFO: 1:1 sitter at beside for suicide precautions. Placed on court hold 2/6. Pending inpatient mental health placement. Patient continue to endorse suicidal thoughts w/ a plan. Court hearing held on 2/14-SW following. Covid recovered on 2/13.     Current condition (current mood & behavior): Pleasant, calm, flat affect, actively suicidal.  Sitter present: yes  Every 15 minute documentation by NA/RN completed for Shift: yes  Room safety Suicide Checklist completed in Epic: yes  Patient's color of severity (suicide scale): RED  Order for psych consult placed (if appropriate): yes  Suicide care plan added: yes

## 2024-02-15 NOTE — PROGRESS NOTES
Current condition (current mood & behavior): Calm, flat affect, actively suicidal.  Sitter present: yes  Every 15 minute documentation by NA/RN completed for Shift: yes  Room safety Suicide Checklist completed in Epic: yes  Patient's color of severity (suicide scale): RED  Order for psych consult placed (if appropriate): yes  Suicide care plan added: yes

## 2024-02-15 NOTE — TELEPHONE ENCOUNTER
R: [place]  Access Inpatient Bed Call Log  2/15/24 @ 1:00am   Intake has called facilities that have not updated their bed status within the last 12 hours.??     *METRO:  [place] -- [facility]: @ cap per website.  [place] -- [facility]:  @ cap per website.  [place] -- [facility]: @ cap per website.  [place] -- [facility]: @ cap per website. Low acuity only.  [place] -- [facility]: @ cap per website.  [facility]: @ cap per website.   [facility]/ beds - POSTING 3 BEDS. Ages 18-25, Voluntary only, NO aggression/physical/sexual assault, violence hx or drug abuse, or psychosis. Negative Covid.   [place] -- [facility]: @ cap per website.  [place] -- [facility]: @ cap per website.  [place] -- [facility]: @ cap per website. Not reviewing until 10AM.    *STATEWIDE (by distance):  [facility] - POSTING 5 BEDS. Mixed unit/Low acuity only.   [facility] - POSTING 3 BEDS. Low acuity, No aggression  [facility] -- @ cap per website.   [facility] - POSTING 3 BEDS. Low acuity only. No current aggression.  [facility] - POSTING 1 BED. Negative Covid. Lower acuity only.  [facility] - POSTING 6 BEDS. Low acuity only. Prefer med-adjustment placements.  [facility] - POSTING 2 BEDS.  No aggression.  [place] - [facility]: @ cap per website. No aggressive behaviors.  [place] -- [facility]: POSTING 7 BEDS.  No hx of aggression. No sexual offenders. Voluntary patients only.  [facility]: POSTING 8 BEDS. Low acuity only. Must be able to do programming. No aggression/violent behavior in 2 years. No CD treatment.  [place] [facility]: POSTING 1 BED. Negative Covid test. Must be low acuity ONLY.  [place] -- [facility]: @ cap per website. Low acuity. Negative Covid.   [place] -- [facility]: POSTING 3 BEDS. Covid Neg. Voluntary only. Mixed unit; no aggression/violent  behavior. No registered sex offenders.   [place] -- [facility]: POSTING 3 BEDS. Negative Covid.   [facility]: @ [facility] per website. No hx of aggression/assault. No lines, drains or tubes. Does not provide detox or CD treatment.   [place] -- [facility]: POSTING 5 BEDS. Negative COVID. No medical devices.   **[place] -- [facility]: POSTING 12 BEDS. Out-of-State Facility.     Pt remains on waitlist pending appropriate placement availability

## 2024-02-15 NOTE — DISCHARGE SUMMARY
"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    Weight as of this encounter: 135.9 kg (299 lb 8 oz).       Follow-ups Needed After Discharge       Unresulted Labs Ordered in the Past 30 Days of this Admission       No orders found from 1/1/2024 to 2/1/2024.            Discharge Disposition   Discharged to [facility]  Condition at discharge: Stable    Hospital Course   Misty Parham is a 38 year old female with major depressive disorder who presented to the ED on 1/31 with suicidal ideation. She was placed on a 72 hour hold while in the ED on 2/1/24 11:47 am as symptoms were impacting her ability to safely and appropriately function in the community, and she is at high risk for death by suicide accidental or intentional due to mental health hx and substance use hx. Hospital staff have petitioned for MI civil commitment, awaiting court determination.    Subsequently complained of sore throat, tested positive for COVID-19 infection on 2/2/24 and thus cannot discharge to inpatient psych until she is off of special precautions per report. Hence admitted to inpatient unit on 2/3/2024 under hospitalist service.    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  *Patient was placed on a 72 hour " hold 2/1/24   *patient is placed on court hold on 02/06/24     -- Psychiatry consult requested during this stay , appreciate their help   -- Effexor dose is increased to 300 mg daily by psychiatry on 02/10/2024  -- QTC is monitored , recheck EKG on Monday   -- Continue Zyprexa 10 mg HS  -- Continue Metformin 500 mg BID, started on 2/1, to help minimize metabolic side effects related to Zyprexa  -- patient is not sleeping very well, PTA medications include 50 mg Trazadone at bedtime and Ambien 10 mg at bedtime PRN , patient has been taking ambien for long period of time   -- Trazodone will be held 3 days after Paxlovid is completed , probably can be started on 02/12/24 night  -- Her PTA TID scheduled Lorazepam was not continued initially during the hospitalization  --Once evaluated by psychiatry, psych was concerned about her history of severe mental illness with many past psychiatric hospitalization, multiple courses of ECT including [facility] and commitments in the past.  -- Per psych they were concerned about catatonia from benzodiazepine withdrawal and have a started patient on alprazolam 1 mg p.o. 3 times daily scheduled  -- Will recommend consulting psychiatry on 02/13/2024 for follow-up.  -- Continue suicide precautions  -- Continue elopement precautions  -- /care coordinator following closely  -- Patient had vaginal examination and preliminary hearing was believed  -- Her court hearing was on 02/14  -- MEDICALLY CLEARED FOR DISCHARGE TO [facility]-19 infection - tested positive 2/2/2024  Complaint of sore throat, intermittent cough.  No chest pain or palpitations  Temperature 101F, heart rate 120s at the time of admission.    *WBC 10.1.  Hemoglobin 15.3.  Creatinine 0.90.  Liver enzymes within normal limits.  CK 23, CRP 38.18.  Procalcitonin 0.18.  hCG qualitative negative.  D-dimer 0.44.  Troponin less than 6.  Chest x-ray showed no acute pathology.  Plan:  -- Patient was  admitted for further evaluation and management   -- Completed Paxlovid on 02/08/24 AM  -- Continue Lovenox twice daily for DVT prophylaxis.  -- Followed CRP, downward trend     -- O2 sats are stable , no need for steroids   -- Aggressive incentive spirometry.    -- Encourage ambulation.  -- As needed antitussives.  Cepacol lozenges.    -- Magic mouthwash ordered.      Tachycardia from fever, infection; resolved   -- Admission workup was negative, except positive COVID PCR as above  --  Recent TSH from December within normal limits.  --  Received IV fluids. Tachycardia improved.  --  COVID treated as above      Elevated blood pressure  -- Patient was noticed to have slightly elevated blood pressure especially diastolic close to 100, patient does not have any known history of hypertension,  -- will continue to monitor , blood pressure is significantly improved     Medically complicated obesity with a BMI of 51.59  Increase all-cause mortality and morbidity.    --consider lifestyle modification with diet and exercise as able to.     Possible JEAN PIERRE/OHS  --Sleep studies as outpatient recommended    Consultations This Hospital Stay   DIAGNOSTIC EVALUATION CENTER (DEC) ASSESSMENT ORDER  PSYCHIATRY IP CONSULT  CARE MANAGEMENT / SOCIAL WORK IP CONSULT  VASCULAR ACCESS ADULT IP CONSULT  PSYCHIATRY IP CONSULT    Code Status   Full Code    Time Spent on this Encounter   I, Arnaud Ferreira MD, personally saw the patient today and spent greater than 30 minutes discharging this patient.       Arnaud Ferreira MD  [place] [address] MEDICAL SPECIALTY UNIT  [facility] [place] [address]  Phone: [phone]  ______________________________________________________________________    Physical Exam   Vital Signs: Temp: 98.1  F (36.7  C) Temp src: Oral BP: (!) 137/91 Pulse: 73   Resp: 18 SpO2: 99 % O2 Device: None (Room air)    Weight: 299 lbs 8 oz  Awake, alert and oriented        Primary Care Physician    Jelena Mackey    Discharge Orders   No discharge procedures on file.    Significant Results and Procedures   Most Recent 3 CBC's:  Recent Labs   Lab Test 02/15/24  0814 02/12/24  0841 02/09/24  1050 02/06/24  1000 02/04/24 2326 02/03/24  1413 12/14/23  1040   WBC  --   --   --   --  6.5 10.1 6.8   HGB  --   --   --   --  13.4 15.3 14.8   MCV  --   --   --   --  90 92 90    199 318   < > 229 272 276    < > = values in this interval not displayed.     Most Recent 3 BMP's:  Recent Labs   Lab Test 02/15/24  0814 02/12/24  0841 02/09/24  1050 02/06/24  1000 02/04/24 2326 02/03/24  1413 12/14/23  1040   NA  --   --   --   --  139 138 139   POTASSIUM  --   --   --   --  3.6 3.8 4.2   CHLORIDE  --   --   --   --  104 98 101   CO2  --   --   --   --  26 27 26   BUN  --   --   --   --  6.9 10.2 5.7*   CR 0.85 0.72 0.73   < > 0.74 0.90 0.87   ANIONGAP  --   --   --   --  9 13 12   CRISTINE  --   --   --   --  9.1 9.8 9.8   GLC  --   --   --   --  102* 89 108*    < > = values in this interval not displayed.     Most Recent 2 LFT's:  Recent Labs   Lab Test 02/04/24 2326 02/03/24  1413   AST 25 43   ALT 24 39   ALKPHOS 62 85   BILITOTAL 0.2 0.3     7-Day Micro Results       No results found for the last 168 hours.          Most Recent Urinalysis:  Recent Labs   Lab Test 02/02/24  1701 12/14/23  1043   COLOR Light Yellow Yellow   APPEARANCE Slightly Cloudy* Cloudy*   URINEGLC Negative Negative   URINEBILI Negative Moderate*   URINEKETONE Trace* 40*   SG 1.009 >=1.030   UBLD Negative Negative   URINEPH 5.0 5.5   PROTEIN Negative 30*   UROBILINOGEN  --  1.0   NITRITE Negative Negative   LEUKEST Negative Negative   RBCU <1 0-2   WBCU 2 5-10*   ,   Results for orders placed or performed during the hospital encounter of 01/31/24   XR Chest 2 Views    Narrative    EXAM: XR CHEST 2 VIEWS  LOCATION: [facility]  DATE: 2/3/2024    INDICATION: COVID 19 infection  COMPARISON: None.      Impression     IMPRESSION: Negative chest. Lungs are clear.         Discharge Medications   Current Discharge Medication List        CONTINUE these medications which have NOT CHANGED    Details   ALPRAZolam (XANAX) 1 MG tablet Take 1 tablet (1 mg) by mouth Three times daily      docusate sodium (COLACE) 100 MG capsule Take 100 mg by mouth 2 times daily      melatonin 3 MG tablet Take 3 mg by mouth daily as needed      polyethylene glycol (MIRALAX) 17 GM/Dose powder Take 17 g by mouth daily  Qty: 510 g, Refills: 0    Associated Diagnoses: Other constipation      venlafaxine (EFFEXOR XR) 150 MG 24 hr capsule Take 150 mg by mouth daily      Vitamin D, Cholecalciferol, 25 MCG (1000 UT) CAPS       zolpidem (AMBIEN) 10 MG tablet Take 10 mg by mouth nightly as needed for sleep      OLANZapine (ZYPREXA) 20 MG tablet Take 20 mg by mouth at bedtime      topiramate (TOPAMAX) 100 MG tablet Take one half tab (50 mg) daily x 14 days; then increase to one tab daily. D/C previous order for this med.      traZODone (DESYREL) 50 MG tablet Take 50 mg by mouth at bedtime           Allergies   No Known Allergies

## 2024-02-16 ENCOUNTER — HOSPITAL ENCOUNTER (INPATIENT)
Facility: HOSPITAL | Age: 39
LOS: 14 days | Discharge: PSYCHIATRIC OR SUBSTANCE USE FACILITY | DRG: 885 | End: 2024-03-01
Attending: PSYCHIATRY & NEUROLOGY | Admitting: STUDENT IN AN ORGANIZED HEALTH CARE EDUCATION/TRAINING PROGRAM
Payer: MEDICARE

## 2024-02-16 ENCOUNTER — PATIENT OUTREACH (OUTPATIENT)
Dept: CARE COORDINATION | Facility: CLINIC | Age: 39
End: 2024-02-16
Payer: MEDICARE

## 2024-02-16 DIAGNOSIS — K59.00 CONSTIPATION, UNSPECIFIED CONSTIPATION TYPE: ICD-10-CM

## 2024-02-16 DIAGNOSIS — F33.3 SEVERE EPISODE OF RECURRENT MAJOR DEPRESSIVE DISORDER, WITH PSYCHOTIC FEATURES (H): ICD-10-CM

## 2024-02-16 DIAGNOSIS — F51.04 CHRONIC INSOMNIA: ICD-10-CM

## 2024-02-16 DIAGNOSIS — R63.5 WEIGHT GAIN: ICD-10-CM

## 2024-02-16 DIAGNOSIS — F41.1 GENERALIZED ANXIETY DISORDER: Primary | ICD-10-CM

## 2024-02-16 DIAGNOSIS — R21 RASH: ICD-10-CM

## 2024-02-16 PROCEDURE — 250N000013 HC RX MED GY IP 250 OP 250 PS 637: Performed by: NURSE PRACTITIONER

## 2024-02-16 PROCEDURE — 250N000013 HC RX MED GY IP 250 OP 250 PS 637

## 2024-02-16 PROCEDURE — 99222 1ST HOSP IP/OBS MODERATE 55: CPT | Performed by: NURSE PRACTITIONER

## 2024-02-16 PROCEDURE — 99223 1ST HOSP IP/OBS HIGH 75: CPT | Mod: AI | Performed by: NURSE PRACTITIONER

## 2024-02-16 PROCEDURE — 124N000004

## 2024-02-16 RX ORDER — OLANZAPINE 10 MG/1
10 TABLET ORAL 3 TIMES DAILY PRN
Status: DISCONTINUED | OUTPATIENT
Start: 2024-02-16 | End: 2024-03-01 | Stop reason: HOSPADM

## 2024-02-16 RX ORDER — DOCUSATE SODIUM 100 MG/1
100 CAPSULE, LIQUID FILLED ORAL 2 TIMES DAILY
Status: DISCONTINUED | OUTPATIENT
Start: 2024-02-16 | End: 2024-03-01 | Stop reason: HOSPADM

## 2024-02-16 RX ORDER — VENLAFAXINE HYDROCHLORIDE 75 MG/1
225 CAPSULE, EXTENDED RELEASE ORAL
Status: DISCONTINUED | OUTPATIENT
Start: 2024-02-16 | End: 2024-02-20

## 2024-02-16 RX ORDER — OLANZAPINE 10 MG/2ML
10 INJECTION, POWDER, FOR SOLUTION INTRAMUSCULAR 3 TIMES DAILY PRN
Status: DISCONTINUED | OUTPATIENT
Start: 2024-02-16 | End: 2024-03-01 | Stop reason: HOSPADM

## 2024-02-16 RX ORDER — POLYETHYLENE GLYCOL 3350 17 G/17G
1 POWDER, FOR SOLUTION ORAL DAILY
COMMUNITY

## 2024-02-16 RX ORDER — ACETAMINOPHEN 325 MG/1
650 TABLET ORAL EVERY 4 HOURS PRN
Status: DISCONTINUED | OUTPATIENT
Start: 2024-02-16 | End: 2024-03-01 | Stop reason: HOSPADM

## 2024-02-16 RX ORDER — OLANZAPINE 10 MG/1
10 TABLET ORAL AT BEDTIME
Status: DISCONTINUED | OUTPATIENT
Start: 2024-02-16 | End: 2024-02-21

## 2024-02-16 RX ORDER — ARIPIPRAZOLE 2 MG/1
2 TABLET ORAL DAILY
Status: DISCONTINUED | OUTPATIENT
Start: 2024-02-16 | End: 2024-02-17

## 2024-02-16 RX ORDER — MAGNESIUM HYDROXIDE/ALUMINUM HYDROXICE/SIMETHICONE 120; 1200; 1200 MG/30ML; MG/30ML; MG/30ML
30 SUSPENSION ORAL EVERY 4 HOURS PRN
Status: DISCONTINUED | OUTPATIENT
Start: 2024-02-16 | End: 2024-03-01 | Stop reason: HOSPADM

## 2024-02-16 RX ORDER — LORAZEPAM 1 MG/1
2 TABLET ORAL 3 TIMES DAILY
Status: DISCONTINUED | OUTPATIENT
Start: 2024-02-16 | End: 2024-03-01 | Stop reason: HOSPADM

## 2024-02-16 RX ORDER — LANOLIN ALCOHOL/MO/W.PET/CERES
3 CREAM (GRAM) TOPICAL
Status: DISCONTINUED | OUTPATIENT
Start: 2024-02-16 | End: 2024-03-01 | Stop reason: HOSPADM

## 2024-02-16 RX ORDER — HYDROXYZINE HYDROCHLORIDE 25 MG/1
25 TABLET, FILM COATED ORAL EVERY 4 HOURS PRN
Status: DISCONTINUED | OUTPATIENT
Start: 2024-02-16 | End: 2024-03-01 | Stop reason: HOSPADM

## 2024-02-16 RX ORDER — POLYETHYLENE GLYCOL 3350 17 G/17G
17 POWDER, FOR SOLUTION ORAL AT BEDTIME
Status: DISCONTINUED | OUTPATIENT
Start: 2024-02-16 | End: 2024-02-28

## 2024-02-16 RX ADMIN — VENLAFAXINE HYDROCHLORIDE 225 MG: 75 CAPSULE, EXTENDED RELEASE ORAL at 13:11

## 2024-02-16 RX ADMIN — HYDROXYZINE HYDROCHLORIDE 25 MG: 25 TABLET, FILM COATED ORAL at 11:40

## 2024-02-16 RX ADMIN — DOCUSATE SODIUM 100 MG: 100 CAPSULE, LIQUID FILLED ORAL at 13:13

## 2024-02-16 RX ADMIN — ARIPIPRAZOLE 2 MG: 2 TABLET ORAL at 13:13

## 2024-02-16 RX ADMIN — DOCUSATE SODIUM 100 MG: 100 CAPSULE, LIQUID FILLED ORAL at 20:41

## 2024-02-16 RX ADMIN — POLYETHYLENE GLYCOL 3350 17 G: 17 POWDER, FOR SOLUTION ORAL at 20:41

## 2024-02-16 RX ADMIN — LORAZEPAM 2 MG: 1 TABLET ORAL at 13:13

## 2024-02-16 RX ADMIN — LORAZEPAM 2 MG: 1 TABLET ORAL at 20:41

## 2024-02-16 RX ADMIN — OLANZAPINE 10 MG: 10 TABLET, FILM COATED ORAL at 20:41

## 2024-02-16 RX ADMIN — METFORMIN HYDROCHLORIDE 500 MG: 500 TABLET ORAL at 18:04

## 2024-02-16 RX ADMIN — METFORMIN HYDROCHLORIDE 500 MG: 500 TABLET ORAL at 13:14

## 2024-02-16 ASSESSMENT — ACTIVITIES OF DAILY LIVING (ADL)
ADLS_ACUITY_SCORE: 42
DRESS: INDEPENDENT;SCRUBS (BEHAVIORAL HEALTH)
ADLS_ACUITY_SCORE: 42
ADLS_ACUITY_SCORE: 55
ADLS_ACUITY_SCORE: 42
ADLS_ACUITY_SCORE: 42
HYGIENE/GROOMING: INDEPENDENT
ADLS_ACUITY_SCORE: 42
ORAL_HYGIENE: INDEPENDENT
ADLS_ACUITY_SCORE: 42
LAUNDRY: UNABLE TO COMPLETE

## 2024-02-16 ASSESSMENT — LIFESTYLE VARIABLES: SKIP TO QUESTIONS 9-10: 1

## 2024-02-16 NOTE — PROGRESS NOTES
02/16/24 0135   Patient Belongings   Did you bring any home meds/supplements to the hospital?  No   Patient Belongings sent to security per site process;locker   Patient Belongings Put in Hospital Secure Location (Security or Locker, etc.) cell phone/electronics;shoes;clothing;other (see comments)   Belongings Search Yes   Clothing Search Yes   Second Staff Salud   Comment Vaseline, 5 packs of hygenic wipes, pads, hospital tolietrees, grey bra, 2 pairs of socks, purple pair of underwear, blue pair of sweatpants, black coat, grey piyush, papers, navy blue  pair of shoes, orange scrubs.     List items sent to safe: Black iphone w/floral case zero scratches.    All other belongings put in assigned cubby in belongings room.   Addendum; 3 pencil sharpeners, colored pencil pack, crossword book, 2 coloring books, and a [phone] creative coloring planner.      I have reviewed my belongings list on admission and verify that it is correct.     Patient signature_______________________________    Second staff witness (if patient unable to sign) ______________________________       I have received all my belongings at discharge.    Patient signature________________________________    Taty   2/16/2024  1:38 AM

## 2024-02-16 NOTE — MEDICATION SCRIBE - ADMISSION MEDICATION HISTORY
Medication Scribe Admission Medication History    Admission medication history is complete. The information provided in this note is only as accurate as the sources available at the time of the update.    Information Source(s): [facility] records and [facility]    Pertinent Information:   Patient transferred from ER where she was boarding 15 days and ER staff was managing medications. Prior to that her medications were managed by [facility]. PTA medications updated per [facility] medication list and last times updated per ER administrations.   While in ER the following medication changes were made:  Effexor increased to 300 mg then decreased to 225 mg.   Xanax changed to ativan  Olanzapine at bedtime initiated.  Abilify 2 mg initiated.  Metformin 500 mg BID initiated.   Pt completed paxlovid on 2/8/24 for COVID    Changes made to PTA medication list:  Added: None  Deleted: None  Changed: updated effexor,trazodone, olanzapine and ambien per [facility] records.     Allergies reviewed with patient and updates made in EHR: yes    Medication History Completed By: Radha Graf 2/16/2024 11:55 AM    PTA Med List   Medication Sig Last Dose    ALPRAZolam (XANAX) 1 MG tablet Take 1 mg by mouth 3 times daily 2/14/2024 at 0849 ER    docusate sodium (COLACE) 100 MG capsule Take 100 mg by mouth 2 times daily 2/12/2024 at 2117 ER    melatonin 3 MG tablet Take 3 mg by mouth at bedtime Unknown    OLANZapine (ZYPREXA) 20 MG tablet Take 10 mg by mouth 2 times daily 2/15/2024 at 2117 ER    polyethylene glycol (MIRALAX) 17 GM/Dose powder Take 1 Capful by mouth daily 2/15/2024 at 2231 ER    traZODone (DESYREL) 50 MG tablet Take 50 mg by mouth nightly as needed Unknown    venlafaxine (EFFEXOR XR) 150 MG 24 hr capsule Take 150 mg by mouth 2 times daily 2/15/2024 at AM ER    Vitamin D3 (CHOLECALCIFEROL) 25 mcg (1000 units) tablet Take 1,000 Units by mouth daily Unknown

## 2024-02-16 NOTE — PLAN OF CARE
Face to face end of shift report received from Maggie LONDON RN. Rounding completed and patient observed in the lounge. No requests at this time.     Goal Outcome Evaluation: Patient sat and watched TV throughout the shift. Her affect is flat. She denied hallucinations. She endorsed depression, anxiety, and SI but contracted for safety. She denied HI and denied pain. Patient did not attend groups. She denied needing any PRNs. During administration of meds, she seemed confused and uninterested. Patient is disheveled.     Face to face end of shift report communicated to oncoming RN.        Problem: Adult Behavioral Health Plan of Care  Goal: Patient-Specific Goal (Individualization)  Description: Pt will attend at least 50% of groups.  Pt will eat at least 50% of meals.  Pt will sleep at least 6-8 hours per night.   Pt will be compliant will ordered medications and treatment team recommendations.       Problem: Thought Process Alteration  Goal: Optimal Thought Clarity  Description: Pt will communicate in reality based conversations.  Pt will verbalize decrease hallucinations by discharge.   Outcome: Not Progressing

## 2024-02-16 NOTE — PLAN OF CARE
Goal Outcome Evaluation:      Plan of Care Reviewed With: patient    SUMMARY: Admitted 1/31 for suicidal ideation.   Hx: Depression, BPD, auditory hallucinations  DATE & TIME: 02/15/24 [id number]  Cognitive Concerns/ Orientation : A&O x 4, calm; flat affect  BEHAVIOR & AGGRESSION TOOL COLOR: Green  ABNL VS/O2: VSS, O2 99% RA  MOBILITY: Independent in room  PAIN MANAGMENT: denies   DIET: Regular   BOWEL/BLADDER: Continent of B&B,  ABNL LAB/BG: NA  DRAIN/DEVICES: No IV access  SKIN: Bruises to abdomen and R arm  TESTS/PROCEDURES: NA  D/C DATE: Transferring to Inpatient mental health [facility] today, ride scheduled at 2000.   OTHER IMPORTANT INFO: 1:1 sitter at beside for suicide precautions. Placed on court hold 2/6. Patient continue to endorse suicidal thoughts w/ a plan. Covid recovered on 2/13. Explained AVS and medication information, pt verbalized understanding. RN to RN report given to Barry at 1000.     Current condition (current mood & behavior): Calm, flat affect, actively suicidal.  Sitter present: yes  Every 15 minute documentation by NA/RN completed for Shift: yes  Room safety Suicide Checklist completed in Epic: yes  Patient's color of severity (suicide scale): RED  Order for psych consult placed (if appropriate): yes  Suicide care plan added: yes

## 2024-02-16 NOTE — PROGRESS NOTES
Clinic Care Coordination Contact  Ambulatory Care Coordination to Inpatient Care Management   Hand-In Communication    Date:  February 16, 2024  Name: Misty Parham is enrolled in Ambulatory Care Coordination program and I am the Lead Care Coordinator.  CC Contact Information: Epic InPinBridgesket + phone  Payor Source: Payor: MEDICARE / Plan: MEDICARE / Product Type: Medicare /   Current services in place:     Please see the CC Snaphot and Care Management Flowsheets for specific details of this Misty Parham care plan.   Additional details/specific concerns r/t this admission:        I will follow this admission in Epic. Please feel free to contact me with questions or for further collaboration in discharge planning.    Maya Gómez,  [facility]  Clinic Care Coordinator  [facility]  [phone]  [email or web address]

## 2024-02-16 NOTE — PROGRESS NOTES
Writer called pt's CM. Writer reached voicemail. Gave CM contact information.    CM called back. She had been working wit pt for about a year and a half. Stated that pt just competed DBT right before going into the ED. Pt lives with mother and struggles with free time. Pt will stay in room and not socialize if there is no appointment or something scheduled. CM stated pt appeared to be doing good until all of a sudden pt switched and was admitted. CM stated that pt is good at masking her feelings. Her recommendations were stabilizations, maybe with POST as pt has hx of stopping meds, and some type of structured living or program. Mother is willing to take pt back also as pt has recently been living with her. Pt sister is also a nurse and helps out with pt's meds.

## 2024-02-16 NOTE — PROGRESS NOTES
"Social Service Psychosocial Assessment    Presenting Problem: According to [facility]: Pt presents after texting her psychiatrist, \"If I die will I go to heaven or hell?\" Pt reports that she was curious what his opinion would be. Pt reports she is frustrated she is in the ED, \"I don't know why he would call 911, it's dumb. I don't need to be here.\" Pt reports she has been having hopelessness and thoughts of suicide via overdose on Trazadone, a prescription she has.      Per Pt: Pt stated that she was having suicidal ideation and that it is an on going thing.     Marital Status:      Spouse / Children: No children    Psychiatric TX HX: Hx of inpatient hospitalizations. History of civil commitment. Pt reports she recently finished a DBT program.     Suicide Risk Assessment: Hx of SI with plans of overdose. Admitted for SI with plan to overdose. Denies SI at time of assessment.     Access to Lethal Means (explain): Denies    Family Psych HX: Substance Use Disorder       A & Ox: x4      Medication Adherence: See H&P    Medical Issues: See H&P      Visual -Motor Functioning: Good    Communication Skills /Needs: Good    Ethnicity: White      Spirituality/Yazidi Affiliation: Pt reports she converted to Anabaptism when she was .     Clergy Request: No      History: None reported      Living Situation: lives with her parents.      ADL s: Independent       Education: Unknown     Financial Situation: disability, social security     Occupation: unemployed/ disability      Leisure & Recreation: music or tv    Childhood History: Unknown      Trauma Abuse HX: Endorses but no details.     Relationship / Sexuality: no/ unknown     Substance Use/ Abuse: Denies    Chemical Dependency Treatment HX: Denies    Legal Issues: Denies    Significant Life Events: Current mental health status, recently had covid while in ED.     Strengths: Ability to communicate needs, in a safe environment    Challenges /Limitation: Poor coping " skills, current mental health symptoms    Patient Support Contact (Include name, relationship, number, and summary of conversation):      Interventions:         Community-Based Programs- Would benefit     Medical/Dental Care- Jelena Mackey MD as PCP     CD Evaluation/Rule 25/Aftercare- No    Medication Management- Has med management     Individual Therapy- Would like     Housing/Placement-lives with her parents.     Case Management-    Marleny Núñez [phone]      Insurance Coverage-MEDICARE/MEDICARE, [facility]     Financial Assistance- disability, social security    Commit/[facility] Screening- Pt is full commitment as of 2/14/24    Suicide Risk Assessment- Hx of SI with plans of overdose. Admitted for SI with plan to overdose. Denies SI at time of assessment.     High Risk Safety Plan- Talk to supports; Call crisis lines; Go to local ER if feeling suicidal.    SARAH Hand  2/16/2024  8:19 AM

## 2024-02-16 NOTE — H&P
[facility]    History and Physical  Medical Services       Date of Admission:  2/16/2024  Date of Service (when I saw the patient): 02/16/24    Assessment & Plan     Principal Problem:    Suicidal ideation    Active Medical Problems:  Recovered covid- covid positive on 2/2/24. Denies any complaints. Denies chest pain, sob, difficulty breathing. Denies feeling ill.     Chronic constipation- reports last bowel movement yesterday. Denies blood in stool. Denies abdominal pain. Takes colace and miralax daily. Nursing to continue to monitor and consult for new or worsening symptoms.     Morbid obesity- weight 299.8 lbs. BMI 49.84. Takes metformin. Encouraged diet and exercise.     Pt medically stable, no acute medical concerns. Chronic medical problems stable. Will sign off. Please consult for any new medical issues or concerns.        Code Status: Full Code    Diana Mckeon CNP    Primary Care Physician   Jelena Mackey    Chief Complaint   Psych evaluation     History is obtained from the patient and electronic health record    History of Present Illness   (Per Discharge) Misty Parham is a 38 year old female with major depressive disorder who presented to the ED on 1/31 with suicidal ideation. She was placed on a 72 hour hold while in the ED on 2/1/24 11:47 am as symptoms were impacting her ability to safely and appropriately function in the community, and she is at high risk for death by suicide accidental or intentional due to mental health hx and substance use hx. Hospital staff have petitioned for MI civil commitment, awaiting court determination.    Subsequently complained of sore throat, tested positive for COVID-19 infection on 2/2/24 and thus cannot discharge to inpatient psych until she is off of special precautions per report. Hence admitted to inpatient unit on 2/3/2024 under hospitalist service.    Past Medical History    I have reviewed this patient's medical history and updated it with  pertinent information if needed.   Past Medical History:   Diagnosis Date    Depressive disorder     Generalized anxiety disorder 5/15/2020       Past Surgical History   I have reviewed this patient's surgical history and updated it with pertinent information if needed.  Past Surgical History:   Procedure Laterality Date    CHOLECYSTECTOMY         Prior to Admission Medications   Prior to Admission Medications   Prescriptions Last Dose Informant Patient Reported? Taking?   ALPRAZolam (XANAX) 1 MG tablet   Yes No   Sig: Take 1 tablet (1 mg) by mouth Three times daily   OLANZapine (ZYPREXA) 20 MG tablet   Yes No   Sig: Take 20 mg by mouth at bedtime   Vitamin D, Cholecalciferol, 25 MCG (1000 UT) CAPS   Yes No   docusate sodium (COLACE) 100 MG capsule   Yes No   Sig: Take 100 mg by mouth 2 times daily   melatonin 3 MG tablet   Yes No   Sig: Take 3 mg by mouth daily as needed   polyethylene glycol (MIRALAX) 17 GM/Dose powder   No No   Sig: Take 17 g by mouth daily   topiramate (TOPAMAX) 100 MG tablet   Yes No   Sig: Take one half tab (50 mg) daily x 14 days; then increase to one tab daily. D/C previous order for this med.   traZODone (DESYREL) 50 MG tablet   Yes No   Sig: Take 50 mg by mouth at bedtime   venlafaxine (EFFEXOR XR) 150 MG 24 hr capsule   Yes No   Sig: Take 150 mg by mouth daily   zolpidem (AMBIEN) 10 MG tablet   Yes No   Sig: Take 10 mg by mouth nightly as needed for sleep      Facility-Administered Medications: None     Allergies   No Known Allergies    Social History   I have reviewed this patient's social history and updated it with pertinent information if needed. Misty Parham  reports that she has never smoked. She has never used smokeless tobacco. She reports that she does not currently use alcohol. She reports that she does not use drugs.    Family History   I have reviewed this patient's family history and updated it with pertinent information if needed.   Family History   Problem Relation Age of  Onset    Diabetes Father        Review of Systems   CONSTITUTIONAL:  negative  EYES:  negative  HEENT:  negative  RESPIRATORY:  negative  CARDIOVASCULAR:  negative  GASTROINTESTINAL:  negative except chronic constipation   GENITOURINARY:  negative  INTEGUMENT/BREAST:  negative except bruising to abdomen reportedly from lovenox injections when hospitalized prior to admission.   HEMATOLOGIC/LYMPHATIC:  negative  ALLERGIC/IMMUNOLOGIC:  negative  ENDOCRINE:  negative  MUSCULOSKELETAL:  negative  NEUROLOGICAL:  negative    Physical Exam   Temp: 96.8  F (36  C) Temp src: Temporal BP: 119/73 Pulse: 92   Resp: 16 SpO2: 97 % O2 Device: None (Room air)    Vital Signs with Ranges  Temp:  [96.8  F (36  C)-97.9  F (36.6  C)] 96.8  F (36  C)  Pulse:  [88-92] 92  Resp:  [16-18] 16  BP: (119-167)/(73-92) 119/73  SpO2:  [97 %-99 %] 97 %  0 lbs 0 oz    Constitutional: awake, alert, cooperative, no apparent distress, and appears stated age, vitals stable  Eyes: Lids and lashes normal, pupils equal, round and reactive to light, extra ocular muscles intact, sclera clear, conjunctiva normal  ENT: Normocephalic, without obvious abnormality, atraumatic, external ears without lesions, oral pharynx with moist mucous membranes, no erythema or exudates  Hematologic / Lymphatic: no cervical lymphadenopathy  Respiratory: No increased work of breathing, good air exchange, clear to auscultation bilaterally, no crackles or wheezing  Cardiovascular: Normal apical impulse, regular rate and rhythm, normal S1 and S2, no S3 or S4, and no murmur noted  GI: obese, bruising to abdomen, normal bowel sounds, soft, non-distended, non-tender, no masses palpated, no hepatosplenomegally  Genitounirinary: deferred  Skin: scattered bruising noted to abdomen, otherwise normal skin color, texture, turgor and no redness, warmth, or swelling  Musculoskeletal: There is no redness, warmth, or swelling of the joints.  Full range of motion noted.  Neurologic: Awake,  alert, oriented to name, place and time.  Cranial nerves II-XII are grossly intact.  Neuropsychiatric: General: tearful, calm, and fair eye contact    Data   Data reviewed today:   Recent Labs   Lab 02/15/24  0814 02/12/24  0841    199   CR 0.85 0.72       No results found for this or any previous visit (from the past 24 hour(s)).

## 2024-02-16 NOTE — PLAN OF CARE
Problem: Adult Behavioral Health Plan of Care  Goal: Patient-Specific Goal (Individualization)  Description: Pt will attend at least 50% of groups.  Pt will eat at least 50% of meals.  Pt will sleep at least 6-8 hours per night.   Pt will be compliant will ordered medications and treatment team recommendations.    2/16/23-No weaning. Treatment team to reassess daily.  Outcome: Not Progressing     Problem: Thought Process Alteration  Goal: Optimal Thought Clarity  Description: Pt will communicate in reality based conversations.  Pt will verbalize decrease hallucinations by discharge.   Outcome: Not Progressing     Problem: Suicide Risk  Goal: Absence of Self-Harm  Outcome: Not Progressing     ADMISSION NOTE    Reason for admission SI with plan to strangle self.  Safety concerns yes, current thoughts of self harm.  Risk for or history of violence denies, however pt noted to have AH.   Full skin assessment: complete, bruising of abdomen from heparin shots at prophylactic     Patient arrived on unit from [facility] accompanied by EMS & [facility] security on 2/16/2024  12:58 AM.   Status on arrival: cooperative, endorsing SI  /73   Pulse 92   Temp 96.8  F (36  C) (Temporal)   Resp 16   SpO2 97%   Patient given tour of unit and Welcome to  unit papers given to patient, wanding completed, belongings inventoried, and admission assessment completed.   Patient's legal status on arrival is committed. Appropriate legal rights discussed with and copy given to patient. Patient Bill of Rights discussed with and copy given to patient.   Patient denies SI, HI, and thoughts of self harm and contracts for safety while on unit.          Patient states she originally got brought into the ER by police as she sent her provider a note in [facility] and when they called to try and get a hold of her she ignored them so the police were sent for a welfare check and brought her in. She said she stopped her medication about 3  months prior. She lives with her mom & step dad. She states her only life stressor is her ex . She has a psychiatrist that she sees about every 3 months. She states her SI started about 4 months ago. She admits to SIB in high school (Review of chart says 2021 hospital stay she attempted to use plastic silverware on multiple occasions and aluminum from juice cups). She stated she has a previous suicide attempt 1 year ago by over dosing on Tylenol PM. She said her support system is her mom and older sister, talking with at least one daily. She said her kids make her happy, states she has 3 of them, step daughter 25yrs, son 20yrs & son 17 yrs. She does not feel that things can get better. She denies HI, pain & anxiety stating she got her anxiety mediation before she left the other hospital. Patient listed mom,  & sister on her release. Patient denies all forms of abuse, smoking, ETOH & drug use. She Denies having a hx of physical aggression or ever having thoughts of hurting someone else. She states when she gets angry or frustrated she shuts down and holds it in, wanting to hurt herself. She feels the way we can best help her if she gets angry or frustrated here is to allow her to talk it out.     Patient appeared to be sleeping for approximately 4 hours since 0245.    Face to face report will be communicated to oncoming RN.    Salud Jenkins RN  2/16/2024  6:46 AM

## 2024-02-16 NOTE — PLAN OF CARE
"Face to face report received from Salud SCHWARTZ. Pt. Observed.    Problem: Adult Behavioral Health Plan of Care  Goal: Patient-Specific Goal (Individualization)  Description: Pt will attend at least 50% of groups.  Pt will eat at least 50% of meals.  Pt will sleep at least 6-8 hours per night.   Pt will be compliant will ordered medications and treatment team recommendations.    Outcome: Progressing  Pt. Denies HI, hallucinations and pain. Pt. Reporting \"I feel numb. I don't feel like I want to live. I'm ready to die. I wont do anything here. I want to overdose on sleeping pills and fall asleep forever. You don't have them here so I won't hurt myself.\" Pt. Reports she hasn't cut herself in years. Pt. Reports the last time she had voices was last time she was in the hospital years ago. Pt. Daniel for safety. Provider called with request to move pt. Room to open unit. PT. Moved to open unit. Pt. Requesting/administered PRN Atarax 25 mg po @ 1140 for increased anxiety, with effective results. Pt. 0930 Medications released from pharmacy at approximately 1300. Pt. 0930 TID Ativan held, provider aware. Pt. Out to [facility] [place] [place], encouraged to attend unit programing.   Pt. Eating WDL. Pt. Offers no c/o issues with bowel and bladder.      Face to face end of shift report communicated to oncoming RN.     Maggie Merrill RN  2/16/2024  2:45 PM      "

## 2024-02-17 PROCEDURE — 99233 SBSQ HOSP IP/OBS HIGH 50: CPT | Performed by: NURSE PRACTITIONER

## 2024-02-17 PROCEDURE — 124N000004

## 2024-02-17 RX ORDER — ARIPIPRAZOLE 5 MG/1
5 TABLET ORAL DAILY
Status: DISCONTINUED | OUTPATIENT
Start: 2024-02-18 | End: 2024-02-20

## 2024-02-17 ASSESSMENT — ACTIVITIES OF DAILY LIVING (ADL)
LAUNDRY: UNABLE TO COMPLETE
ADLS_ACUITY_SCORE: 42
HYGIENE/GROOMING: INDEPENDENT
ADLS_ACUITY_SCORE: 42
ORAL_HYGIENE: INDEPENDENT
ADLS_ACUITY_SCORE: 42
DRESS: INDEPENDENT;SCRUBS (BEHAVIORAL HEALTH)
ADLS_ACUITY_SCORE: 42

## 2024-02-17 NOTE — PROGRESS NOTES
"[facility] PSYCHIATRY  PROGRESS NOTE     SUBJECTIVE     Misty is up in the lounge when I see her today. She agrees to meet in her room. Eye contact is limited, speech is more delayed today. Patient initially states she is \"fine\". When asked why she had refused her medications this morning she states \"because I am prepared to die\". When asked why she felt she was going to die she notes \"I can just feel it\". She denies any auditory hallucinations, does appear a bit preoccupied. She is tearful when stating this. Yesterday she had made mention that if she  she could be an anmol and be around her children all of the time. Abilify had been increased today and patient did refuse this in addition to refusing Ativan which is likely why she seems more withdrawn and answers are more delayed. Did discuss with patient that if she continues to decline medication, a petition for etienne will likely need to be filed on Monday. Patient does have a history of etienne and lucas merino being in place when on commitment due to severity of her mental illness.        MEDICATIONS   Scheduled Meds:   [START ON 2024] ARIPiprazole  5 mg Oral Daily    docusate sodium  100 mg Oral BID    LORazepam  2 mg Oral TID    metFORMIN  500 mg Oral BID w/meals    OLANZapine  10 mg Oral At Bedtime    polyethylene glycol  17 g Oral At Bedtime    venlafaxine  225 mg Oral Daily with breakfast     PRN Meds:.acetaminophen, alum & mag hydroxide-simethicone, hydrOXYzine HCl, melatonin, nicotine, OLANZapine **OR** OLANZapine     ALLERGIES   No Known Allergies     MENTAL STATUS EXAM   Vitals: /61   Pulse 83   Temp 97.3  F (36.3  C) (Temporal)   Resp 16   SpO2 96%     Appearance:  awake, alert, dressed in hospital scrubs, appeared as age stated, and slightly unkempt  Attitude:  guarded  Eye Contact:  fair  Mood:  anxious, sad , and depressed  Affect:  mood congruent  Speech:  clear, coherent, slightly delayed  Psychomotor Behavior:  no evidence " of tardive dyskinesia, dystonia, or tics  Thought Process: illogical, concrete  Associations:  no loose associations noted though offers little today  Thought Content:  denies hallucinations though appears a bit preoccupied, passive SI   Insight:  poor  Judgment:  poor  Oriented to:  time, person, and place  Attention Span and Concentration:  limited  Recent and Remote Memory: fair  Fund of Knowledge: low-normal  Muscle Strength and Tone: normal  Gait and Station: Normal       LABS   No results found for this or any previous visit (from the past 24 hour(s)).      IMPRESSION     This is a 38 year old female with a PMH of MDD, psychosis, IVY, borderline personality disorder, suicide attempts who presents to the ED for evaluation of SI. She reportedly had sent a message to her psychiatrist that alluded to patient having SI. Patient has been hospitalized many times in the past. She has a history of commitment with lowell and shayy merino. A petition for commitment was filed with the county and supported. Today patient continues to report SI, though does state that these feelings are chronic. She denies that there was any particular stressor that prompted her hospitalization. She is in agreement to continue the medications that had been started when she was at [facility]. Effexor XR had been increased to 300 mg for 5 or 6 days though patient reported no significant improvement in depression or anxiety so dose was decreased back to 225 mg daily yesterday. Abilify was added yesterday. It does appear from previous notes that she has been in this in the past. Xanax was switched to Ativan to treat symptoms of catatonia with documented improvement in catatonic symptoms in past few days. Patient is in agreement to continue these medications with changes, she denies any side effects. Did also review that once stable plan will be for IRTS placement, can likely start sending referrals next week.        DIAGNOSES      Schizoaffective disorder, depressed type with catatonia vs MDD with psychosis  2.   IVY  3.   Hx of borderline personality disorder       PLAN     Location: Unit 5  Legal Status: Orders Placed This Encounter      Legal status Patient is Committed    Safety Assessment:    Behavioral Orders   Procedures    Code 1 - Restrict to Unit    Routine Programming     As clinically indicated    Status 15     Every 15 minutes.      PTA psychotropic medications stopped:     -Xanax-> had been switched to Ativan prior to admit     PTA psychotropic medications continued/changed:     -Zyprexa 10 mg at bedtime  -Abilify 2 mg daily-> 5 mg daily on 2/17  -Ativan 2 mg TID  -Effexor  mg daily    New medications tried and stopped:     -None    New medications initiated:     -standard unit PRN medications     Today's Changes:    - encourage compliance with medication  - may need to consider filing etienne petition Monday if patient continues to refuse medications all weekend    Programming: Patient will be treated in a therapeutic milieu with appropriate individual and group therapies. Education will be provided on diagnoses, medications, and treatments.     Medical diagnoses:  Per medicine    Consult: None  Tests: None    Anticipated LOS: > 7 days  Disposition: likely IRTS when more stable       TREATMENT TEAM CARE PLAN     Progress: Continued symptoms. Refusing meds today. Continues to report feeling ready to die though no active plan to harm self    Continued Stay Criteria/Rationale: Continued symptoms without sufficient improvement/resolution.    Medical/Physical: See above.    Precautions: See above.     Plan: Continue inpatient care with unit support and medication management.    Rationale for change in precautions or plan: NA due to no change.    Participants: Cora Griffith, NP, Nursing, SW, OT.    The patient's care was discussed with the treatment team and chart notes were reviewed.       ATTESTATION      Cora  Gladis, NP

## 2024-02-17 NOTE — PLAN OF CARE
"Face to face report received from Juliana SCHWARTZ. Pt. Observed.    Problem: Adult Behavioral Health Plan of Care  Goal: Patient-Specific Goal (Individualization)  Description: Pt will attend at least 50% of groups.  Pt will eat at least 50% of meals.  Pt will sleep at least 6-8 hours per night.   Pt will be compliant will ordered medications and treatment team recommendations.    Outcome: Progressing  Pt. Out to lounge all a.m. Pt. Denies HI, hallucinations and pain. Pt. Reporting she feels \"I want to die now. I don't have what I need to die here so I won't do anything\" Pt. Is in agreement to update this writer if she feels she is  going to harm herself. Pt. Refusing all scheduled medications this am, provider updated. Pt. Eating WDL. Pt. Encouraged to shower.     Care of pt. Continues into zarina shift. Pt. Withdrawn. Out to lounge most of zarina shift, coloring at a table by herself. Pt. Continues to refuse scheduled medications. Pt. Eating WDL. Offers no c/o issues with bowel and bladder.        Face to face end of shift report communicated to oncoming LANA.     Maggie Merrill RN  2/17/2024        "

## 2024-02-17 NOTE — H&P
"[facility] PSYCHIATRY   HISTORY AND PHYSICAL     ADMISSION DATA     Misty Parham MRN# [id number]   Age: 38 year old YOB: 1985     Date of Admission: 2/16/2024  Primary Physician: Jelena Mackey        CHIEF COMPLAINT   Admitted for SI       HISTORY OF PRESENT ILLNESS     Misty is a 38 year old female patient who presented to the [facility] ED on 1/31/24 for evaluation of suicidal ideation. History of depression, BPD, auditory hallucinations, SI and IVY. Patient reported intermittent mood swings and reported \"I am not afraid to die\". She has been compliant with medications except Zyprexa which she had stopped 3 month earlier. Her psychiatrist received a text from her saying \"If I die will I go to heaven or hell\" and \"I am going to see angels today.\" This prompted concern for her therapist to call 911.  Incidentally, patient was swabbed for COVID which came back as positive. Patient was admitted to [facility] medical unit for required quarantine prior to transfer to behavioral health. Petition for commitment was filed and supported by the [facility]. Patient was transferred to the behavioral health unit for further evaluation and treatment once cleared from COVID precautions.    Per DEC:  Misty Parham presents to the ED via EMS. Patient is presenting to the ED for the following concerns: Suicidal ideation.   Factors that make the mental health crisis life threatening or complex are:  Pt presents after texting her psychiatrist, \"[facility]\" Pt reports that she was curious what his opinion would be. Pt reports she is frustrated she is in the ED, \"I don't know why he would call 911, it's dumb. I don't need to be here.\" Pt reports she has been having hopelessness and thoughts of suicide via overdose on Trazadone, a prescription she has. Pt reports she stopped Olazepine 3 months ago due to struggling to lose weight. Pt reports she has an appointment with her psychiatrist " "tomorrow, 2/1/2024. Pt reports her mother is upset she is in the ED, and states, \"this is why I don't tell her when I am struggling. I don't tell anyone, I just deal with it myself.\" Pt reports that she wants to go home. Pt presents as minimally responsive, flat, apathetic, and struggled to engage in safety planning. Pt was unable to identify supports she can talk to when struggling, reports it's hard for her to ask for help, lacks consistent skills she can use, and is unsure about her parents managing her medications; to restrict her access to means to harm herself.    Per patient:  Patient is guarded in conversation though cooperative. She notes that her mood has been \"low\" lately. She notes that her anxiety has been high. She currently denies hallucinations. She reports that her sleep has been variable, is typically able to fall asleep fine though wakes during the night. She does report ongoing suicidal thoughts. She states that she is \"not afraid to die\". She states that the suicidal thoughts are chronic and almost always there, though they have been \"more potent\" lately. She denies that there have been any increase in stressors recently, she notes she is not sure why she has been feeling more suicidal lately. Currently living with mother and stepfather, she reports they have a good relationship. She recently finished a DBT program which she found helpful. She reports feeling that medications are \"okay\" right now. She was meeting with psychiatry when on the medical unit at [facility] and some adjustments have been made. Patient shrugs shoulders when asked about changes or if she feels medication has been helpful.       PSYCHIATRIC HISTORY     Multiple hospital stays in the past. Has been committed with lowell and lucas merino previously. Last had ECT in 2022. Currently on an MI commitment. Just finished a DBT program in January. Several SA in past. Psych provider Betty Cazares. Most recent medications include " Effexor, Xanax, Ambien, Zyprexa, Trazodone, Abilify as well as likely others.       SUBSTANCE USE HISTORY   History   Drug Use Unknown       Social History    Substance and Sexual Activity      Alcohol use: Not Currently      History   Smoking Status    Never   Smokeless Tobacco    Never     Denies use of drugs or alcohol.       SOCIAL HISTORY   Social History     Socioeconomic History    Marital status: Legally      Spouse name: Not on file    Number of children: Not on file    Years of education: Not on file    Highest education level: Not on file   Occupational History    Not on file   Tobacco Use    Smoking status: Never    Smokeless tobacco: Never   Vaping Use    Vaping Use: Never used   Substance and Sexual Activity    Alcohol use: Not Currently    Drug use: Never    Sexual activity: Not Currently   Other Topics Concern    Not on file   Social History Narrative    Not on file     Social Determinants of Health     Financial Resource Strain: Not on file   Food Insecurity: Not on file   Transportation Needs: Not on file   Physical Activity: Not on file   Stress: Not on file   Social Connections: Not on file   Interpersonal Safety: Not on file   Housing Stability: Not on file     Most recently living with mother and stepfather. Not working.       FAMILY HISTORY   Family History   Problem Relation Age of Onset    Diabetes Father          PAST MEDICAL HISTORY   Past Medical History:   Diagnosis Date    Depressive disorder     Generalized anxiety disorder 5/15/2020       Past Surgical History:   Procedure Laterality Date    CHOLECYSTECTOMY         Patient has no known allergies.     MEDICATIONS   Prior to Admission medications    Medication Sig Start Date End Date Taking? Authorizing Provider   ALPRAZolam (XANAX) 1 MG tablet Take 1 mg by mouth 3 times daily 8/3/23  Yes Reported, Patient   docusate sodium (COLACE) 100 MG capsule Take 100 mg by mouth 2 times daily   Yes Reported, Patient   melatonin 3 MG  tablet Take 3 mg by mouth at bedtime 8/10/21  Yes Reported, Patient   OLANZapine (ZYPREXA) 20 MG tablet Take 10 mg by mouth 2 times daily   Yes Unknown, Entered By History   polyethylene glycol (MIRALAX) 17 GM/Dose powder Take 1 Capful by mouth daily   Yes Reported, Patient   traZODone (DESYREL) 50 MG tablet Take 50 mg by mouth nightly as needed   Yes Unknown, Entered By History   venlafaxine (EFFEXOR XR) 150 MG 24 hr capsule Take 150 mg by mouth 2 times daily 7/30/23  Yes Reported, Patient   Vitamin D3 (CHOLECALCIFEROL) 25 mcg (1000 units) tablet Take 1,000 Units by mouth daily   Yes Reported, Patient   zolpidem (AMBIEN) 10 MG tablet Take 10 mg by mouth at bedtime    Unknown, Entered By History        PHYSICAL EXAM/ROS     I have reviewed the physical exam as documented by Diana Mckeon CNP and agree with findings and assessment and have no additional findings to add at this time. The review of systems is negative other than noted in the HPI.       LABS   No results found for this or any previous visit (from the past 24 hour(s)).      MENTAL STATUS EXAM   Vitals: /67   Pulse 110   Temp 99.8  F (37.7  C) (Tympanic)   Resp 16   SpO2 95%     Appearance:  awake, alert, adequately groomed, dressed in hospital scrubs, and appeared as age stated  Attitude:  cooperative and guarded  Eye Contact:  limited  Mood:  anxious, sad , and depressed  Affect:  mood congruent  Speech:  clear, coherent, slightly delayed  Psychomotor Behavior:  no evidence of tardive dyskinesia, dystonia, or tics  Thought Process:  linear  Associations:  no loose associations  Thought Content:  passive SI which patient notes is chronic, denies any hallucinations  Insight:  limited  Judgment:  limited  Oriented to:  time, person, and place  Attention Span and Concentration:  fair  Recent and Remote Memory:  fair  Fund of Knowledge: appropriate  Muscle Strength and Tone: normal  Gait and Station: not observed, patient in bed       ASSESSMENT      This is a 38 year old female with a PMH of MDD, psychosis, IVY, borderline personality disorder, suicide attempts who presents to the ED for evaluation of SI. She reportedly had sent a message to her psychiatrist that alluded to patient having SI. Patient has been hospitalized many times in the past. She has a history of commitment with lowell and shayy merino. A petition for commitment was filed with the county and supported. Today patient continues to report SI, though does state that these feelings are chronic. She denies that there was any particular stressor that prompted her hospitalization. She is in agreement to continue the medications that had been started when she was at [facility]. Effexor XR had been increased to 300 mg for 5 or 6 days though patient reported no significant improvement in depression or anxiety so dose was decreased back to 225 mg daily yesterday. Abilify was added yesterday. It does appear from previous notes that she has been in this in the past. Xanax was switched to Ativan to treat symptoms of catatonia with documented improvement in catatonic symptoms in past few days. Patient is in agreement to continue these medications with changes, she denies any side effects. Did also review that once stable plan will be for IRTS placement, can likely start sending referrals next week.       DIAGNOSIS     Schizoaffective disorder, depressed type with catatonia vs MDD with psychosis  2.   IVY  3.   Hx of borderline personality disorder       PLAN     Location: Unit 5  Legal Status: Orders Placed This Encounter      Legal status Patient is Committed    Safety Assessment:    Behavioral Orders   Procedures    Code 1 - Restrict to Unit    Routine Programming     As clinically indicated    Status 15     Every 15 minutes.      PTA psychotropic medications held:     -Xanax-> had been switched to Ativan prior to admit    PTA psychotropic medications continued/changed:     -Zyprexa 10 mg at  bedtime  -Abilify 2 mg daily  -Ativan 2 mg TID  -Effexor  mg daily    New medications initiated:     -standard unit PRN medications    Programming: Patient will be treated in a therapeutic milieu with appropriate individual and group therapies. Education will be provided on diagnoses, medications, and treatments.     Medical diagnoses:  Per medicine    Consult: none  Tests: none    Anticipated LOS: > 7 days  Disposition: likely IRTS vs other structured setting    Justification for hospitalization: reasons for hospitalization include potential safety risk to self or others within the last week, decreased functioning in outpatient setting and in the setting of no outpatient management, need for highly structured inpatient management for stabilization of psychiatric symptoms, need for psychiatric medication initiation and stabilization.       ATTESTATION      Cora Griffith NP

## 2024-02-17 NOTE — PLAN OF CARE
Face to face end of shift report will be communicated to oncoming RN.    Problem: Adult Behavioral Health Plan of Care  Goal: Patient-Specific Goal (Individualization)  Description: Pt will attend at least 50% of groups.  Pt will eat at least 50% of meals.  Pt will sleep at least 6-8 hours per night.   Pt will be compliant will ordered medications and treatment team recommendations.    2/17/2024 0615 by Juliana Coffman RN  Outcome: Progressing     Problem: Thought Process Alteration  Goal: Optimal Thought Clarity  Description: Pt will communicate in reality based conversations.  Pt will verbalize decrease hallucinations by discharge.   Outcome: Progressing     Problem: Suicide Risk  Goal: Absence of Self-Harm  Outcome: Progressing  Face to face end of shift report obtained from LANA Sands. Pt observed resting in bed.   Pt appears to be sleeping in bed with eyes closed. 15 minutes and PRN safety checks completed with no noted complains. No self harm or delusional comments noted or reported so far this shift.   0600-Pt appeared to had slept 6.5 hours.

## 2024-02-18 PROCEDURE — 124N000004

## 2024-02-18 PROCEDURE — 99232 SBSQ HOSP IP/OBS MODERATE 35: CPT | Performed by: NURSE PRACTITIONER

## 2024-02-18 PROCEDURE — 250N000013 HC RX MED GY IP 250 OP 250 PS 637: Performed by: NURSE PRACTITIONER

## 2024-02-18 PROCEDURE — 250N000011 HC RX IP 250 OP 636: Performed by: NURSE PRACTITIONER

## 2024-02-18 RX ORDER — ONDANSETRON 4 MG/1
4 TABLET, ORALLY DISINTEGRATING ORAL EVERY 8 HOURS PRN
Status: DISCONTINUED | OUTPATIENT
Start: 2024-02-18 | End: 2024-03-01 | Stop reason: HOSPADM

## 2024-02-18 RX ADMIN — LORAZEPAM 2 MG: 1 TABLET ORAL at 20:22

## 2024-02-18 RX ADMIN — DOCUSATE SODIUM 100 MG: 100 CAPSULE, LIQUID FILLED ORAL at 20:22

## 2024-02-18 RX ADMIN — ONDANSETRON 4 MG: 4 TABLET, ORALLY DISINTEGRATING ORAL at 18:41

## 2024-02-18 RX ADMIN — OLANZAPINE 10 MG: 10 TABLET, FILM COATED ORAL at 20:22

## 2024-02-18 RX ADMIN — POLYETHYLENE GLYCOL 3350 17 G: 17 POWDER, FOR SOLUTION ORAL at 20:22

## 2024-02-18 ASSESSMENT — ACTIVITIES OF DAILY LIVING (ADL)
HYGIENE/GROOMING: INDEPENDENT
ADLS_ACUITY_SCORE: 42
HYGIENE/GROOMING: INDEPENDENT
ADLS_ACUITY_SCORE: 42
LAUNDRY: UNABLE TO COMPLETE
ADLS_ACUITY_SCORE: 42
LAUNDRY: UNABLE TO COMPLETE
ADLS_ACUITY_SCORE: 42
ADLS_ACUITY_SCORE: 42
DRESS: INDEPENDENT;SCRUBS (BEHAVIORAL HEALTH)
DRESS: INDEPENDENT;SCRUBS (BEHAVIORAL HEALTH)
ORAL_HYGIENE: INDEPENDENT
ORAL_HYGIENE: INDEPENDENT
ADLS_ACUITY_SCORE: 42
ADLS_ACUITY_SCORE: 42

## 2024-02-18 NOTE — PROGRESS NOTES
"[facility] PSYCHIATRY  PROGRESS NOTE     SUBJECTIVE     Misty is resting in bed when I see her today. She notes that she is \"so-so\" today. She notes that she slept \"fine\". She has been refusing to take any medications today and yesterday. She took medications on Friday. Did ask patient why she had been compliant with medications for the almost 2 weeks she had been at [facility] though stopped once she got here, she replies that \"I thought that they would let me go home but they sent me here\". Will likely need to file etienne petition tomorrow. She did spend much of the day yesterday up in the lounge, though does not socialize much with peers. She continues to report feeling suicidal and feels \"ready to die\" though states she is safe in the hospital.        MEDICATIONS   Scheduled Meds:   ARIPiprazole  5 mg Oral Daily    docusate sodium  100 mg Oral BID    LORazepam  2 mg Oral TID    metFORMIN  500 mg Oral BID w/meals    OLANZapine  10 mg Oral At Bedtime    polyethylene glycol  17 g Oral At Bedtime    venlafaxine  225 mg Oral Daily with breakfast     PRN Meds:.acetaminophen, alum & mag hydroxide-simethicone, hydrOXYzine HCl, melatonin, nicotine, OLANZapine **OR** OLANZapine     ALLERGIES   No Known Allergies     MENTAL STATUS EXAM   Vitals: /88   Pulse 78   Temp 97.4  F (36.3  C) (Temporal)   Resp 18   SpO2 96%     Appearance:  awake, alert, dressed in hospital scrubs, appeared as age stated, and slightly unkempt  Attitude:  guarded  Eye Contact:  fair  Mood:  anxious, sad , and depressed  Affect:  mood congruent  Speech:  clear, coherent, slightly delayed  Psychomotor Behavior:  no evidence of tardive dyskinesia, dystonia, or tics  Thought Process: illogical, concrete  Associations:  no loose associations noted though offers little today  Thought Content:  denies hallucinations though appears a bit preoccupied, passive SI   Insight:  poor  Judgment:  poor  Oriented to:  time, person, and place  Attention " Span and Concentration:  limited  Recent and Remote Memory: fair  Fund of Knowledge: low-normal  Muscle Strength and Tone: normal  Gait and Station: Normal       LABS   No results found for this or any previous visit (from the past 24 hour(s)).      IMPRESSION     This is a 38 year old female with a PMH of MDD, psychosis, IVY, borderline personality disorder, suicide attempts who presents to the ED for evaluation of SI. She reportedly had sent a message to her psychiatrist that alluded to patient having SI. Patient has been hospitalized many times in the past. She has a history of commitment with lowell and shayy merino. A petition for commitment was filed with the county and supported. Today patient continues to report SI, though does state that these feelings are chronic. She denies that there was any particular stressor that prompted her hospitalization. She is in agreement to continue the medications that had been started when she was at [facility]. Effexor XR had been increased to 300 mg for 5 or 6 days though patient reported no significant improvement in depression or anxiety so dose was decreased back to 225 mg daily yesterday. Abilify was added yesterday. It does appear from previous notes that she has been in this in the past. Xanax was switched to Ativan to treat symptoms of catatonia with documented improvement in catatonic symptoms in past few days. Patient is in agreement to continue these medications with changes, she denies any side effects. Did also review that once stable plan will be for IRTS placement, can likely start sending referrals next week.        DIAGNOSES     Schizoaffective disorder, depressed type with catatonia vs MDD with psychosis  2.   IVY  3.   Hx of borderline personality disorder       PLAN     Location: Unit 5  Legal Status: Orders Placed This Encounter      Legal status Patient is Committed    Safety Assessment:    Behavioral Orders   Procedures    Code 1 - Restrict to Unit     Routine Programming     As clinically indicated    Status 15     Every 15 minutes.      PTA psychotropic medications stopped:     -Xanax-> had been switched to Ativan prior to admit     PTA psychotropic medications continued/changed:     -Zyprexa 10 mg at bedtime  -Abilify 2 mg daily-> 5 mg daily on 2/17  -Ativan 2 mg TID  -Effexor  mg daily    New medications tried and stopped:     -None    New medications initiated:     -standard unit PRN medications     Today's Changes:    - encourage compliance with medication  - likely file etienne petition Monday as pt has refused medication all weekend    Programming: Patient will be treated in a therapeutic milieu with appropriate individual and group therapies. Education will be provided on diagnoses, medications, and treatments.     Medical diagnoses:  Per medicine    Consult: None  Tests: None    Anticipated LOS: > 7 days  Disposition: likely IRTS when more stable       ATTESTATION      Cora Griffith NP

## 2024-02-18 NOTE — PLAN OF CARE
Face to face end of shift report will be communicated to oncoming RN.    Problem: Adult Behavioral Health Plan of Care  Goal: Patient-Specific Goal (Individualization)  Description: Pt will attend at least 50% of groups.  Pt will eat at least 50% of meals.  Pt will sleep at least 6-8 hours per night.   Pt will be compliant will ordered medications and treatment team recommendations.    Outcome: Progressing     Problem: Thought Process Alteration  Goal: Optimal Thought Clarity  Description: Pt will communicate in reality based conversations.  Pt will verbalize decrease hallucinations by discharge.   Outcome: Progressing     Problem: Suicide Risk  Goal: Absence of Self-Harm  Outcome: Progressing  Face to face end of shift report obtained from LANA Serrano. Pt observed resting in bed.   Pt appears to be sleeping in bed with eyes closed. 15 minutes and PRN safety checks completed with no noted complains. No self harm or delusional comments noted or reported so far this shift.    0600-Pt appeared to had slept all night.

## 2024-02-18 NOTE — PLAN OF CARE
Face to face report received from Juliana SCHWARTZ. Pt. Observed.    Problem: Adult Behavioral Health Plan of Care  Goal: Patient-Specific Goal (Individualization)  Description: Pt will attend at least 50% of groups.  Pt will eat at least 50% of meals.  Pt will sleep at least 6-8 hours per night.   Pt will be compliant will ordered medications and treatment team recommendations.  Outcome: Progressing    Pt. Out to lounge at times this a.m. Pt. Denies HI, hallucinations and pain. Pt. Reporting she feels she is ready to die. Pt. Is in agreement to update this writer if she feels she is  going to harm herself. Pt. Refusing all scheduled medications this am, provider updated. Pt. Eating WDL. Pt. Showering.     Care of pt. Continues into zarina shift. Pt. Continues to refuse scheduled medications. pt requesting medication for nausea. On call provider called. Message left.      1820 TORB for Zofran 4 mg po PRN every 8 hours PRN for nausea.     Pt. In agreement to take scheduled HS medications.       Face to face end of shift report communicated to oncoming LANA.     Maggie Merrill RN  2/18/2024

## 2024-02-19 PROCEDURE — 124N000004

## 2024-02-19 PROCEDURE — 99233 SBSQ HOSP IP/OBS HIGH 50: CPT | Performed by: NURSE PRACTITIONER

## 2024-02-19 PROCEDURE — 250N000013 HC RX MED GY IP 250 OP 250 PS 637: Performed by: NURSE PRACTITIONER

## 2024-02-19 PROCEDURE — 250N000011 HC RX IP 250 OP 636: Performed by: NURSE PRACTITIONER

## 2024-02-19 RX ADMIN — DOCUSATE SODIUM 100 MG: 100 CAPSULE, LIQUID FILLED ORAL at 08:37

## 2024-02-19 RX ADMIN — ONDANSETRON 4 MG: 4 TABLET, ORALLY DISINTEGRATING ORAL at 05:03

## 2024-02-19 RX ADMIN — VENLAFAXINE HYDROCHLORIDE 225 MG: 75 CAPSULE, EXTENDED RELEASE ORAL at 08:34

## 2024-02-19 RX ADMIN — LORAZEPAM 2 MG: 1 TABLET ORAL at 08:35

## 2024-02-19 RX ADMIN — LORAZEPAM 2 MG: 1 TABLET ORAL at 20:18

## 2024-02-19 RX ADMIN — LORAZEPAM 2 MG: 1 TABLET ORAL at 13:40

## 2024-02-19 RX ADMIN — DOCUSATE SODIUM 100 MG: 100 CAPSULE, LIQUID FILLED ORAL at 20:18

## 2024-02-19 RX ADMIN — POLYETHYLENE GLYCOL 3350 17 G: 17 POWDER, FOR SOLUTION ORAL at 20:18

## 2024-02-19 RX ADMIN — OLANZAPINE 10 MG: 10 TABLET, FILM COATED ORAL at 20:18

## 2024-02-19 RX ADMIN — METFORMIN HYDROCHLORIDE 500 MG: 500 TABLET ORAL at 08:35

## 2024-02-19 RX ADMIN — METFORMIN HYDROCHLORIDE 500 MG: 500 TABLET ORAL at 16:12

## 2024-02-19 RX ADMIN — ARIPIPRAZOLE 5 MG: 5 TABLET ORAL at 08:34

## 2024-02-19 ASSESSMENT — ACTIVITIES OF DAILY LIVING (ADL)
ADLS_ACUITY_SCORE: 42
ADLS_ACUITY_SCORE: 42
DRESS: SCRUBS (BEHAVIORAL HEALTH);INDEPENDENT
HYGIENE/GROOMING: INDEPENDENT
ADLS_ACUITY_SCORE: 42
ORAL_HYGIENE: INDEPENDENT
HYGIENE/GROOMING: INDEPENDENT
ADLS_ACUITY_SCORE: 42
ADLS_ACUITY_SCORE: 42
ORAL_HYGIENE: INDEPENDENT
ADLS_ACUITY_SCORE: 42

## 2024-02-19 NOTE — PLAN OF CARE
Problem: Adult Behavioral Health Plan of Care  Goal: Patient-Specific Goal (Individualization)  Description: Pt will attend at least 50% of groups.  Pt will eat at least 50% of meals.  Pt will sleep at least 6-8 hours per night.   Pt will be compliant will ordered medications and treatment team recommendations.    Outcome: Progressing  Note:     1045 Patient is alert and up on the unit with a sad and flat affect. Patient is steady on her feet. Patient has no physical complaints. Patient accepted AM medications. Patient quiet with a sad and flat affect. Ate breakfast meal. Currently has no complaints of auditory hallucinations but appears depressed and anxious.    1415 Patient is in room resting but took 2 pm medications. Patient is blunted and says few words but nods head in response to questions in an appropriate manner. Patient ate well for lunch meal. Has had no concerns.    Face to face end of shift report communicated to 3-11 shift RN.     Kim Kellogg RN  2/19/2024  2:15 PM         Problem: Thought Process Alteration  Goal: Optimal Thought Clarity  Description: Pt will communicate in reality based conversations.  Pt will verbalize decrease hallucinations by discharge.   Outcome: Progressing     Problem: Suicide Risk  Goal: Absence of Self-Harm  Outcome: Progressing      Goal Outcome Evaluation:    Plan of Care Reviewed With: patient

## 2024-02-19 NOTE — PLAN OF CARE
Face to face end of shift report will be communicated to oncoming RN.     Problem: Adult Behavioral Health Plan of Care  Goal: Patient-Specific Goal (Individualization)  Description: Pt will attend at least 50% of groups.  Pt will eat at least 50% of meals.  Pt will sleep at least 6-8 hours per night.   Pt will be compliant will ordered medications and treatment team recommendations.    Outcome: Progressing     Problem: Thought Process Alteration  Goal: Optimal Thought Clarity  Description: Pt will communicate in reality based conversations.  Pt will verbalize decrease hallucinations by discharge.   Outcome: Progressing     Problem: Suicide Risk  Goal: Absence of Self-Harm  Outcome: Progressing  Face to face end of shift report obtained from LANA Serrano. Pt observed resting in bed.   Pt appears to be sleeping in bed with eyes closed. 15 minutes and PRN safety checks completed with no noted complains. No self harm or delusional comments noted or reported so far this shift.    0503-Pt c/o nausea and headache. Zofran ODT 4 mg given.   0515-States nausea is improving, states still has a headache. Will wait to administer Acetaminophen as pt request until nausea clears.   0530-Pt appears to be sleeping at this time.  0600-Pt appeared to had slept 6.5 hours so far this shift.

## 2024-02-20 PROCEDURE — 124N000004

## 2024-02-20 PROCEDURE — 99233 SBSQ HOSP IP/OBS HIGH 50: CPT | Performed by: NURSE PRACTITIONER

## 2024-02-20 PROCEDURE — 250N000013 HC RX MED GY IP 250 OP 250 PS 637: Performed by: NURSE PRACTITIONER

## 2024-02-20 RX ORDER — ARIPIPRAZOLE 10 MG/1
10 TABLET ORAL DAILY
Status: DISCONTINUED | OUTPATIENT
Start: 2024-02-21 | End: 2024-03-01 | Stop reason: HOSPADM

## 2024-02-20 RX ORDER — VENLAFAXINE HYDROCHLORIDE 75 MG/1
150 CAPSULE, EXTENDED RELEASE ORAL
Status: COMPLETED | OUTPATIENT
Start: 2024-02-21 | End: 2024-02-22

## 2024-02-20 RX ORDER — VENLAFAXINE HYDROCHLORIDE 75 MG/1
75 CAPSULE, EXTENDED RELEASE ORAL
Status: DISCONTINUED | OUTPATIENT
Start: 2024-02-23 | End: 2024-02-22

## 2024-02-20 RX ORDER — VENLAFAXINE HYDROCHLORIDE 75 MG/1
150 CAPSULE, EXTENDED RELEASE ORAL
Status: DISCONTINUED | OUTPATIENT
Start: 2024-02-21 | End: 2024-02-20

## 2024-02-20 RX ADMIN — VENLAFAXINE HYDROCHLORIDE 225 MG: 75 CAPSULE, EXTENDED RELEASE ORAL at 08:36

## 2024-02-20 RX ADMIN — OLANZAPINE 10 MG: 10 TABLET, FILM COATED ORAL at 20:18

## 2024-02-20 RX ADMIN — LORAZEPAM 2 MG: 1 TABLET ORAL at 08:36

## 2024-02-20 RX ADMIN — METFORMIN HYDROCHLORIDE 500 MG: 500 TABLET ORAL at 08:36

## 2024-02-20 RX ADMIN — ARIPIPRAZOLE 5 MG: 5 TABLET ORAL at 08:36

## 2024-02-20 RX ADMIN — DOCUSATE SODIUM 100 MG: 100 CAPSULE, LIQUID FILLED ORAL at 20:18

## 2024-02-20 RX ADMIN — LORAZEPAM 2 MG: 1 TABLET ORAL at 15:57

## 2024-02-20 RX ADMIN — METFORMIN HYDROCHLORIDE 500 MG: 500 TABLET ORAL at 17:29

## 2024-02-20 RX ADMIN — DOCUSATE SODIUM 100 MG: 100 CAPSULE, LIQUID FILLED ORAL at 08:36

## 2024-02-20 RX ADMIN — POLYETHYLENE GLYCOL 3350 17 G: 17 POWDER, FOR SOLUTION ORAL at 20:18

## 2024-02-20 RX ADMIN — LORAZEPAM 2 MG: 1 TABLET ORAL at 22:17

## 2024-02-20 ASSESSMENT — ACTIVITIES OF DAILY LIVING (ADL)
ADLS_ACUITY_SCORE: 42
ORAL_HYGIENE: INDEPENDENT
DRESS: SCRUBS (BEHAVIORAL HEALTH);INDEPENDENT
ADLS_ACUITY_SCORE: 42
LAUNDRY: UNABLE TO COMPLETE
ADLS_ACUITY_SCORE: 42
ADLS_ACUITY_SCORE: 42
HYGIENE/GROOMING: INDEPENDENT
ADLS_ACUITY_SCORE: 42
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
## 2024-02-20 NOTE — PROGRESS NOTES
"[facility] PSYCHIATRY  PROGRESS NOTE     SUBJECTIVE   Misty was very apathetic and nonchalant today when talking about her suicidality once again.  She again commented on drinking minimal fluids as an attempt to die of dehydration.  Will put her on a I and O.  She did receive ECT in the past under court order for withholding fluids and food.  She does states she has been eating.  Today she was talking about her 2 children and her stepdaughter who she is quite close with and how \"it will be better to be their guardian anmol\".  Her affect is extremely flat.  She talks of suicide very nonchalantly as if it is not a big deal.  She denies any hallucinations.  No delusions are noted.  She offers very little in conversation unless asked and keeps to herself when she is out in the lounge not talking much to other patients.  She states she has not gone to any groups while she has been here.  She does endorse significant anhedonia.  When I asked what she used to do for fun she told me that she \"used to like to work on [employer] art\".  She states she cannot participate even in this as she has no enjoyment in much of anything and has not for many years.  I did review her medication list extensively and see that she has tried lithium and it was discontinued due to significant tremor.  She has also been on Abilify in the past.  She has not tried Depakote.  Throughout all of the years she has been on mood stabilizers and antipsychotics she has always also been on antidepressants.  It does not appear she has been on mood stabilizers alone.  Her diagnosis for many years has been schizoaffective disorder and borderline personality disorder.  Potential that continuing her on antidepressants with the rest of the medications could be continuing some symptoms or worsening them.  Will try to taper off of the venlafaxine.         MEDICATIONS   Scheduled Meds:   [START ON 2/21/2024] ARIPiprazole  10 mg Oral Daily    docusate sodium  100 " "mg Oral BID    LORazepam  2 mg Oral TID    metFORMIN  500 mg Oral BID w/meals    OLANZapine  10 mg Oral At Bedtime    polyethylene glycol  17 g Oral At Bedtime    [START ON 2/21/2024] venlafaxine  150 mg Oral Daily with breakfast    [START ON 2/23/2024] venlafaxine  75 mg Oral Daily with breakfast     PRN Meds:.acetaminophen, alum & mag hydroxide-simethicone, hydrOXYzine HCl, melatonin, nicotine, OLANZapine **OR** OLANZapine, ondansetron     ALLERGIES   No Known Allergies     MENTAL STATUS EXAM   Vitals: /69   Pulse 92   Temp 98  F (36.7  C) (Tympanic)   Resp 16   SpO2 95%     Appearance:  awake, alert, dressed in hospital scrubs, appeared as age stated, and slightly unkempt  Attitude:  guarded  Eye Contact:  fair  Mood:  flat  Affect:  flat restricted  Speech:  clear, coherent, slightly delayed  Psychomotor Behavior:  no evidence of tardive dyskinesia, dystonia, or tics  Thought Process: apathetic  Associations:  no loose associations   Thought Content:  denies hallucinations though appears a bit preoccupied \"I will quit drinking and try to die of dehydration\"  Insight:  poor  Judgment:  poor  Oriented to:  time, person, and place  Attention Span and Concentration:  limited  Recent and Remote Memory: fair  Fund of Knowledge: low-normal  Muscle Strength and Tone: normal  Gait and Station: Normal       LABS   No results found for this or any previous visit (from the past 24 hour(s)).      IMPRESSION     This is a 38 year old female with a PMH of MDD, psychosis, IVY, borderline personality disorder, suicide attempts who presents to the ED for evaluation of SI. She reportedly had sent a message to her psychiatrist that alluded to patient having SI. Patient has been hospitalized many times in the past. She has a history of commitment with etienne and shayy merino. A petition for commitment was filed with the county and supported. Today patient continues to report SI, though does state that these feelings are " chronic. She denies that there was any particular stressor that prompted her hospitalization. She is in agreement to continue the medications that had been started when she was at [facility]. Effexor XR had been increased to 300 mg for 5 or 6 days though patient reported no significant improvement in depression or anxiety so dose was decreased back to 225 mg daily yesterday. Abilify was added yesterday. It does appear from previous notes that she has been in this in the past. Xanax was switched to Ativan to treat symptoms of catatonia with documented improvement in catatonic symptoms in past few days. Patient is in agreement to continue these medications with changes, she denies any side effects. Did also review that once stable plan will be for IRTS placement, can likely start sending referrals next week.        DIAGNOSES     Schizoaffective disorder, depressed type with catatonia vs MDD with psychosis  2.   IVY  3.   Hx of borderline personality disorder       PLAN     Location: Unit 5  Legal Status: Orders Placed This Encounter      Legal status Patient is Committed    Safety Assessment:    Behavioral Orders   Procedures    Code 1 - Restrict to Unit    Routine Programming     As clinically indicated    Status 15     Every 15 minutes.      PTA psychotropic medications stopped:     -Xanax-> had been switched to Ativan prior to admit     PTA psychotropic medications continued/changed:     -Zyprexa 10 mg at bedtime  -Abilify 2 mg daily-> 5 mg daily on 2/17  -Ativan 2 mg TID  -Effexor  mg daily    New medications tried and stopped:     -None    New medications initiated:     -standard unit PRN medications     Today's Changes:    -OT ordered for alpha stim  -increase abilify 10 mg hs   -tapering effexor xr  -possibly try depakote ER as this is one of the only mood stabilizers she has not tried.     Programming: Patient will be treated in a therapeutic milieu with appropriate individual and group therapies.  Education will be provided on diagnoses, medications, and treatments.     Medical diagnoses:  Per medicine    Consult: None  Tests: None    Anticipated LOS: > 7 days  Disposition: likely IRTS when more stable       ATTESTATION    April Angelic Yan NP

## 2024-02-20 NOTE — PROGRESS NOTES
IRTS referrals sent to [facility], [facility], [facility], [facility] , [facility], and [facility].

## 2024-02-20 NOTE — PLAN OF CARE
Problem: Adult Behavioral Health Plan of Care  Goal: Patient-Specific Goal (Individualization)  Description: Pt will attend at least 50% of groups.  Pt will eat at least 50% of meals.  Pt will sleep at least 6-8 hours per night.   Pt will be compliant will ordered medications and treatment team recommendations.    2/19/2024 1803 by Kim Kellogg RN  Outcome: Progressing  Note:     1730 Patient is alert and up on the unit. Sitting in the lounge with peers and coloring at times. Patient ate well for dinner. Denies current problems but continues to endorse suicidal thoughts. Patient is blunted in affect. Steady on her feet. Behavior is appropriate.    2215 Patient in bed with eyes closed. Took all of medications today.    Face to face end of shift report communicated to 11-7 shift RN.     Kim Kellogg RN  2/19/2024  10:30 PM          Problem: Thought Process Alteration  Goal: Optimal Thought Clarity  Description: Pt will communicate in reality based conversations.  Pt will verbalize decrease hallucinations by discharge.   2/19/2024 1803 by Kim Klelogg RN  Outcome: Progressing     Problem: Suicide Risk  Goal: Absence of Self-Harm  2/19/2024 1803 by Kim Kellogg RN  Outcome: Progressing   Goal Outcome Evaluation:    Plan of Care Reviewed With: patient

## 2024-02-20 NOTE — PLAN OF CARE
Problem: Adult Behavioral Health Plan of Care  Goal: Patient-Specific Goal (Individualization)  Description: Pt will attend at least 50% of groups.  Pt will eat at least 50% of meals.  Pt will sleep at least 6-8 hours per night.   Pt will be compliant will ordered medications and treatment team recommendations.    Outcome: Progressing     Problem: Thought Process Alteration  Goal: Optimal Thought Clarity  Description: Pt will communicate in reality based conversations.  Pt will verbalize decrease hallucinations by discharge.   Outcome: Progressing     Problem: Suicide Risk  Goal: Absence of Self-Harm  Outcome: Progressing   Goal Outcome Evaluation:    Pt in lounge at the start of the shift. Pt reported still being suicidal and refused to  contract for safety. Pt states she will probably be ok today since her plan will take a couple of days. Reported to her provider, pt had already told her that her plan is to dye from dehydration. Pt reports her depression at 7/10. Pt reports high anxiety. Pt denies pain, HI and hallucinations, pt attended groups this shift.       Plan of Care Reviewed With: patient

## 2024-02-20 NOTE — PLAN OF CARE
Face to face end of shift report received from Kim SCHWARTZ. Rounding completed. Patient observed in bed appears asleep.     Problem: Adult Behavioral Health Plan of Care  Goal: Patient-Specific Goal (Individualization)  Description: Pt will attend at least 50% of groups.  Pt will eat at least 50% of meals.  Pt will sleep at least 6-8 hours per night.   Pt will be compliant will ordered medications and treatment team recommendations.    Outcome: Progressing     Problem: Thought Process Alteration  Goal: Optimal Thought Clarity  Description: Pt will communicate in reality based conversations.  Pt will verbalize decrease hallucinations by discharge.   Outcome: Progressing     Problem: Suicide Risk  Goal: Absence of Self-Harm  Outcome: Progressing   Goal Outcome Evaluation:         No observed episodes of SIB this shift. Patient slept (7) hours this shift. Face to face end of shift report communicated to oncoming shift.     Barry Ingram RN  2/20/2024  0624 AM

## 2024-02-21 PROCEDURE — 250N000013 HC RX MED GY IP 250 OP 250 PS 637: Performed by: NURSE PRACTITIONER

## 2024-02-21 PROCEDURE — 124N000004

## 2024-02-21 PROCEDURE — 99233 SBSQ HOSP IP/OBS HIGH 50: CPT | Performed by: NURSE PRACTITIONER

## 2024-02-21 PROCEDURE — 999N000104 HC STATISTIC NO CHARGE

## 2024-02-21 RX ORDER — DIVALPROEX SODIUM 500 MG/1
500 TABLET, EXTENDED RELEASE ORAL AT BEDTIME
Status: DISCONTINUED | OUTPATIENT
Start: 2024-02-21 | End: 2024-02-22

## 2024-02-21 RX ORDER — OLANZAPINE 5 MG/1
5 TABLET ORAL AT BEDTIME
Status: DISCONTINUED | OUTPATIENT
Start: 2024-02-21 | End: 2024-02-22

## 2024-02-21 RX ADMIN — METFORMIN HYDROCHLORIDE 500 MG: 500 TABLET ORAL at 17:00

## 2024-02-21 RX ADMIN — ARIPIPRAZOLE 10 MG: 10 TABLET ORAL at 09:11

## 2024-02-21 RX ADMIN — LORAZEPAM 2 MG: 1 TABLET ORAL at 13:59

## 2024-02-21 RX ADMIN — LORAZEPAM 2 MG: 1 TABLET ORAL at 20:26

## 2024-02-21 RX ADMIN — POLYETHYLENE GLYCOL 3350 17 G: 17 POWDER, FOR SOLUTION ORAL at 20:36

## 2024-02-21 RX ADMIN — METFORMIN HYDROCHLORIDE 500 MG: 500 TABLET ORAL at 09:11

## 2024-02-21 RX ADMIN — DOCUSATE SODIUM 100 MG: 100 CAPSULE, LIQUID FILLED ORAL at 20:27

## 2024-02-21 RX ADMIN — OLANZAPINE 5 MG: 5 TABLET, FILM COATED ORAL at 20:27

## 2024-02-21 RX ADMIN — LORAZEPAM 2 MG: 1 TABLET ORAL at 09:11

## 2024-02-21 RX ADMIN — DOCUSATE SODIUM 100 MG: 100 CAPSULE, LIQUID FILLED ORAL at 09:11

## 2024-02-21 RX ADMIN — DIVALPROEX SODIUM 500 MG: 500 TABLET, EXTENDED RELEASE ORAL at 20:26

## 2024-02-21 RX ADMIN — VENLAFAXINE HYDROCHLORIDE 150 MG: 75 CAPSULE, EXTENDED RELEASE ORAL at 09:10

## 2024-02-21 ASSESSMENT — ACTIVITIES OF DAILY LIVING (ADL)
ADLS_ACUITY_SCORE: 43
ADLS_ACUITY_SCORE: 43
HYGIENE/GROOMING: INDEPENDENT
ORAL_HYGIENE: INDEPENDENT
ADLS_ACUITY_SCORE: 42
ADLS_ACUITY_SCORE: 43
ADLS_ACUITY_SCORE: 42
DRESS: SCRUBS (BEHAVIORAL HEALTH);INDEPENDENT
ADLS_ACUITY_SCORE: 42
ADLS_ACUITY_SCORE: 42
LAUNDRY: UNABLE TO COMPLETE
ADLS_ACUITY_SCORE: 43

## 2024-02-21 NOTE — PLAN OF CARE
"Face to face shift report received from LANA Marino.       Problem: Adult Behavioral Health Plan of Care  Goal: Patient-Specific Goal (Individualization)  Description: Pt will attend at least 50% of groups.  Pt will eat at least 50% of meals.  Pt will sleep at least 6-8 hours per night.   Pt will be compliant will ordered medications and treatment team recommendations.  Outcome: Not Progressing  Pt received scheduled Ativan 2mg at 1557. During administration of this medication pt observed to have blunted affect. Minimal and delayed with responses during conversation. During dinner time pt less delayed, and participates more in conversation with writer while sitting in lounge area. Pt drank pau cup full of water during medication administration x3. Pt observed to be drinking multiple cups of coffee while sitting in lounge area watching television. During snack time pt drank two grape juices. During HS medication administration pt drank two apple juices with Miralax and other scheduled medications.   Pt reports continued suicidal ideation, reports that she has been limiting her fluid intake in an attempt to \"dehydrate myself.\" Pt reports feeling hopeless. Denies any other self injurious behaviors this shift. Pt reports that in the past she has undergone ECT treatments, however is hesitant to undergo these again due to \"I think I have some memory problems because of this.\"     Problem: Thought Process Alteration  Goal: Optimal Thought Clarity  Description: Pt will communicate in reality based conversations.  Pt will verbalize decrease hallucinations by discharge.   Outcome: Not Progressing       Problem: Suicide Risk  Goal: Absence of Self-Harm  Outcome:    Free from self harm or injury this shift.     Face to face end of shift report to be communicated to oncoming RN.       "

## 2024-02-21 NOTE — PROGRESS NOTES
OT met with patient this afternoon to complete initial alpha stim session. She declined. Will check back toimorrow. Plan to discuss cognitive testing as well due to conversation with provider today.

## 2024-02-21 NOTE — PROGRESS NOTES
OT met with patient to complete initial alpha stim session. Patient notes that she is feeling ok right now and doesn't want to try the alpha stim at this time. OT to check back this afternoon.

## 2024-02-21 NOTE — PLAN OF CARE
Face to face end of shift report received from Daniela SCHWARTZ. Rounding completed. Patient observed in bed appears asleep.   Problem: Adult Behavioral Health Plan of Care  Goal: Patient-Specific Goal (Individualization)  Description: Pt will attend at least 50% of groups.  Pt will eat at least 50% of meals.  Pt will sleep at least 6-8 hours per night.   Pt will be compliant will ordered medications and treatment team recommendations.    Outcome: Progressing     Problem: Thought Process Alteration  Goal: Optimal Thought Clarity  Description: Pt will communicate in reality based conversations.  Pt will verbalize decrease hallucinations by discharge.   Outcome: Progressing     Problem: Suicide Risk  Goal: Absence of Self-Harm  Outcome: Progressing   Goal Outcome Evaluation:         No observed episodes of SIB this shift. Patient slept (5.75) hours.Face to face end of shift report communicated to oncoming shift.     Barry Ingram RN  2/21/2024  0612 AM
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
## 2024-02-21 NOTE — PLAN OF CARE
"  Problem: Adult Behavioral Health Plan of Care  Goal: Patient-Specific Goal (Individualization)  Description: Pt will attend at least 50% of groups.  Pt will eat at least 50% of meals.  Pt will sleep at least 6-8 hours per night.   Pt will be compliant will ordered medications and treatment team recommendations.    Outcome: Progressing  Note: 07:30; Received end of shift report from LANA Reddy. Pt out in dayroom coloring upon arrival---  08:45: Endorses active on-going SI, plan to die \"dehydration\" Med compliant, flat/restricted affect, mood is depressed, withdrawn, intermittent eye contact.  Ate 100% of breakfast.     14:30: Withdrawn activity, avoids social contact, shy demeanor, no group observed or reported group participation, sits out in dayroom et colors. Med compliant. Pt showered prior to lunch. Continues endorsement of active SI, contracts for safety. Out on unit majority of shift.     Face to face end of shift report communicated to on-coming PM staff.     Eva Zavala RN  2/21/2024  3:59 PM            Problem: Thought Process Alteration  Goal: Optimal Thought Clarity  Description: Pt will communicate in reality based conversations.  Pt will verbalize decrease hallucinations by discharge.   Outcome: Progressing     Problem: Suicide Risk  Goal: Absence of Self-Harm  Outcome: Progressing   Goal Outcome Evaluation:                        "

## 2024-02-22 ENCOUNTER — APPOINTMENT (OUTPATIENT)
Dept: OCCUPATIONAL THERAPY | Facility: HOSPITAL | Age: 39
DRG: 885 | End: 2024-02-22
Attending: PSYCHIATRY & NEUROLOGY
Payer: MEDICARE

## 2024-02-22 PROCEDURE — 250N000013 HC RX MED GY IP 250 OP 250 PS 637: Performed by: NURSE PRACTITIONER

## 2024-02-22 PROCEDURE — 97165 OT EVAL LOW COMPLEX 30 MIN: CPT | Mod: GO

## 2024-02-22 PROCEDURE — 99233 SBSQ HOSP IP/OBS HIGH 50: CPT | Performed by: NURSE PRACTITIONER

## 2024-02-22 PROCEDURE — 97129 THER IVNTJ 1ST 15 MIN: CPT | Mod: GO

## 2024-02-22 PROCEDURE — 124N000004

## 2024-02-22 RX ORDER — DIVALPROEX SODIUM 500 MG/1
1000 TABLET, EXTENDED RELEASE ORAL AT BEDTIME
Status: DISCONTINUED | OUTPATIENT
Start: 2024-02-22 | End: 2024-03-01 | Stop reason: HOSPADM

## 2024-02-22 RX ADMIN — DOCUSATE SODIUM 100 MG: 100 CAPSULE, LIQUID FILLED ORAL at 20:30

## 2024-02-22 RX ADMIN — METFORMIN HYDROCHLORIDE 500 MG: 500 TABLET ORAL at 09:03

## 2024-02-22 RX ADMIN — POLYETHYLENE GLYCOL 3350 17 G: 17 POWDER, FOR SOLUTION ORAL at 20:31

## 2024-02-22 RX ADMIN — METFORMIN HYDROCHLORIDE 500 MG: 500 TABLET ORAL at 17:22

## 2024-02-22 RX ADMIN — DOCUSATE SODIUM 100 MG: 100 CAPSULE, LIQUID FILLED ORAL at 09:03

## 2024-02-22 RX ADMIN — ARIPIPRAZOLE 10 MG: 10 TABLET ORAL at 09:03

## 2024-02-22 RX ADMIN — DIVALPROEX SODIUM 1000 MG: 500 TABLET, EXTENDED RELEASE ORAL at 20:30

## 2024-02-22 RX ADMIN — LORAZEPAM 2 MG: 1 TABLET ORAL at 14:48

## 2024-02-22 RX ADMIN — LORAZEPAM 2 MG: 1 TABLET ORAL at 09:03

## 2024-02-22 RX ADMIN — LORAZEPAM 2 MG: 1 TABLET ORAL at 20:30

## 2024-02-22 RX ADMIN — VENLAFAXINE HYDROCHLORIDE 150 MG: 75 CAPSULE, EXTENDED RELEASE ORAL at 09:03

## 2024-02-22 ASSESSMENT — ACTIVITIES OF DAILY LIVING (ADL)
HYGIENE/GROOMING: INDEPENDENT
ORAL_HYGIENE: INDEPENDENT
ADLS_ACUITY_SCORE: 43
LAUNDRY: UNABLE TO COMPLETE
ADLS_ACUITY_SCORE: 43
ADLS_ACUITY_SCORE: 43
DRESS: SCRUBS (BEHAVIORAL HEALTH);INDEPENDENT
ADLS_ACUITY_SCORE: 43

## 2024-02-22 NOTE — PLAN OF CARE
Problem: Suicide Risk  Goal: Absence of Self-Harm  Intervention: Promote Psychosocial Wellbeing  Recent Flowsheet Documentation  Taken 2/21/2024 1700 by Walt Hodges RN  Family/Support System Care: self-care encouraged  Outcome: No change    Pt continues to be actively suicidal and plan to die from not drinking water     Problem: Thought Process Alteration  Goal: Optimal Thought Clarity  Description: Pt will communicate in reality based conversations.  Pt will verbalize decrease hallucinations by discharge.   Outcome: No change    Pt is having constant obsessive suicidal thoughts     Problem: Adult Behavioral Health Plan of Care  Goal: Patient-Specific Goal (Individualization)  Description: Pt will attend at least 50% of groups.  Pt will eat at least 50% of meals.  Pt will sleep at least 6-8 hours per night.   Pt will be compliant will ordered medications and treatment team recommendations.    Outcome: Progressing   Goal Outcome Evaluation:    Plan of Care Reviewed With: patient      1530 Face to face rounding complete.  Pt introduced to nursing for the shift.      Pt was out in the dayroom almost all shift coloring though is withdrawn to herself. She told me that she has constant suicidal thoughts right now and is coloring to distract herself. She told me that she is trying to not drink water so that she dehydrates and dies. She did have two ice juices from the kitchen and some sips of water with her medications. She had one orange juice with her Miralax.  Pt told me that her children will be better off without her.  She shared that she has only had 7 months of her life without suicidal ideation and severe depression.  She has a flat affect and appears preoccupied often.        2300 Face to face end of shift report communicated to night shift RN's along with Pt's fall risk.     Walt Hodges RN  2/21/2024
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
## 2024-02-22 NOTE — PLAN OF CARE
Problem: Adult Behavioral Health Plan of Care  Goal: Patient-Specific Goal (Individualization)  Description: Pt will attend at least 50% of groups.  Pt will eat at least 50% of meals.  Pt will sleep at least 6-8 hours per night.   Pt will be compliant will ordered medications and treatment team recommendations.    Outcome: Progressing  Note: 07:30: Received end of shift report from LANA Reddy. [facility].  09:05: Med compliant, denies need for prn pharmacological intervention @ this time. Slightly improved mood et affect, less withdrawn. Denies any side affects, states adequate sleep. Group participation encouraged.     14:30: Brighter affect, looking fwd to cleaning room, organizing, art room etc. When discharged home-- Overall reduction in active SI- Excellent food intake--   this writer observed pt's fluid intake of 720+ ml-    No group participation reported or observed---Completed cognitive eval with OT-- See above progress note by almaz CALI OT for further details.     Face to face end of shift report communicated to on-coming PM staff.     Eva Zavala RN  2/22/2024  4:28 PM        Problem: Thought Process Alteration  Goal: Optimal Thought Clarity  Description: Pt will communicate in reality based conversations.  Pt will verbalize decrease hallucinations by discharge.   Outcome: Progressing     Problem: Suicide Risk  Goal: Absence of Self-Harm  Outcome: Progressing   Goal Outcome Evaluation:

## 2024-02-22 NOTE — PLAN OF CARE
Face to face end of shift report received from Walt SCHWARTZ. Rounding completed. Patient observed in bed appears asleep.     Problem: Adult Behavioral Health Plan of Care  Goal: Patient-Specific Goal (Individualization)  Description: Pt will attend at least 50% of groups.  Pt will eat at least 50% of meals.  Pt will sleep at least 6-8 hours per night.   Pt will be compliant will ordered medications and treatment team recommendations.    Outcome: Progressing     Problem: Thought Process Alteration  Goal: Optimal Thought Clarity  Description: Pt will communicate in reality based conversations.  Pt will verbalize decrease hallucinations by discharge.   Outcome: Progressing     Problem: Suicide Risk  Goal: Absence of Self-Harm  Outcome: Progressing   Goal Outcome Evaluation:       No observed episodes of SIB this shift. Patient slept (5.75) hours. Face to face end of shift report communicated to oncoming shift.     Barry Ingram RN  2/22/2024  0609 AM

## 2024-02-23 PROCEDURE — 250N000013 HC RX MED GY IP 250 OP 250 PS 637: Performed by: NURSE PRACTITIONER

## 2024-02-23 PROCEDURE — 99233 SBSQ HOSP IP/OBS HIGH 50: CPT | Performed by: NURSE PRACTITIONER

## 2024-02-23 PROCEDURE — 124N000004

## 2024-02-23 RX ADMIN — DOCUSATE SODIUM 100 MG: 100 CAPSULE, LIQUID FILLED ORAL at 20:36

## 2024-02-23 RX ADMIN — METFORMIN HYDROCHLORIDE 500 MG: 500 TABLET ORAL at 08:11

## 2024-02-23 RX ADMIN — ARIPIPRAZOLE 10 MG: 10 TABLET ORAL at 08:11

## 2024-02-23 RX ADMIN — POLYETHYLENE GLYCOL 3350 17 G: 17 POWDER, FOR SOLUTION ORAL at 20:36

## 2024-02-23 RX ADMIN — DOCUSATE SODIUM 100 MG: 100 CAPSULE, LIQUID FILLED ORAL at 08:11

## 2024-02-23 RX ADMIN — LORAZEPAM 2 MG: 1 TABLET ORAL at 20:36

## 2024-02-23 RX ADMIN — LORAZEPAM 2 MG: 1 TABLET ORAL at 13:25

## 2024-02-23 RX ADMIN — METFORMIN HYDROCHLORIDE 500 MG: 500 TABLET ORAL at 16:48

## 2024-02-23 RX ADMIN — DIVALPROEX SODIUM 1000 MG: 500 TABLET, EXTENDED RELEASE ORAL at 20:36

## 2024-02-23 RX ADMIN — LORAZEPAM 2 MG: 1 TABLET ORAL at 08:11

## 2024-02-23 ASSESSMENT — ACTIVITIES OF DAILY LIVING (ADL)
ADLS_ACUITY_SCORE: 43
HYGIENE/GROOMING: INDEPENDENT
ADLS_ACUITY_SCORE: 43

## 2024-02-23 NOTE — PLAN OF CARE
Problem: Adult Behavioral Health Plan of Care  Goal: Patient-Specific Goal (Individualization)  Description: Pt will attend at least 50% of groups.  Pt will eat at least 50% of meals.  Pt will sleep at least 6-8 hours per night.   Pt will be compliant will ordered medications and treatment team recommendations.    Outcome: Progressing   Goal Outcome Evaluation:       Face to face shift report received from RN. Rounding completed, pt observed.Client rested in room for 7 hours with eyes closed and respirations noted.Face to face report will be communicated to oncoming RN.    Janes Mckinney RN  2/23/2024  6:25 AM

## 2024-02-23 NOTE — PLAN OF CARE
Problem: Suicide Risk  Goal: Absence of Self-Harm  Intervention: Promote Psychosocial Wellbeing  Recent Flowsheet Documentation  Taken 2/22/2024 1749 by Walt Hodges RN  Supportive Measures:   active listening utilized   positive reinforcement provided   self-reflection promoted  Taken 2/22/2024 1700 by Walt Hodges, RN  Supportive Measures:   active listening utilized   positive reinforcement provided   problem-solving facilitated   self-care encouraged   self-reflection promoted   self-responsibility promoted   verbalization of feelings encouraged  Family/Support System Care: self-care encouraged    Pt says her suicidal thoughts have reduced and are less intrusive     Problem: Thought Process Alteration  Goal: Optimal Thought Clarity  Description: Pt will communicate in reality based conversations.  Pt will verbalize decrease hallucinations by discharge.   Outcome: Progressing    Pt had organized logical thinking     Problem: Adult Behavioral Health Plan of Care  Goal: Patient-Specific Goal (Individualization)  Description: Pt will attend at least 50% of groups.  Pt will eat at least 50% of meals.  Pt will sleep at least 6-8 hours per night.   Pt will be compliant will ordered medications and treatment team recommendations.    Outcome: Progressing  Flowsheets (Taken 2/22/2024 2116)  Patient Vulnerabilities: history of unsuccessful treatment   Goal Outcome Evaluation:    Plan of Care Reviewed With: patient      1530 Face to face rounding complete.  Pt introduced to nursing for the shift.    Pt was up and active this shift coloring and spent some time talking with peers and watching TV. She told me that she is feeling a bit better today and is drinking and eating normally.  She says that she feels that the new med changes are helping. She talked with me about her family and her other supports. We discussed [facility] support groups and she was given a list of the support groups around the state and virtual.  Pt's  affect remains flat and sullen.    Face to face end of shift report communicated to Night Shift RN's along with Pt's fall risk.     Walt Hodges RN  2/22/2024

## 2024-02-23 NOTE — PLAN OF CARE
Face to face shift report received from LANA Marrero.       Problem: Adult Behavioral Health Plan of Care  Goal: Patient-Specific Goal (Individualization)  Description: Pt will attend at least 50% of groups.  Pt will eat at least 50% of meals.  Pt will sleep at least 6-8 hours per night.   Pt will be compliant will ordered medications and treatment team recommendations.  Outcome: Progressing   Calm and cooperative. Medication compliant. Reports depression and anxiety. Reports lessened suicidal thoughts. Appropriate during conversation. Observed to smile and laugh during conversation with writer. Spends time in lounge area. No reports of pain.     Ate 100% of breakfast and lunch. Drinking adequate fluids.     Problem: Thought Process Alteration  Goal: Optimal Thought Clarity  Description: Pt will communicate in reality based conversations.  Pt will verbalize decrease hallucinations by discharge.   Outcome: Progressing       Problem: Suicide Risk  Goal: Absence of Self-Harm  Outcome: Progressing   Free from self harm or injury this shift.     Face to face end of shift report to be communicated to oncoming RN.

## 2024-02-23 NOTE — PROGRESS NOTES
"[facility] PSYCHIATRY  PROGRESS NOTE     SUBJECTIVE     Misty is up in the lounge coloring when I see her today. She notes that she feels \"okay\" today. She reports vague SI, though feels it is less intense when compared to admission. She denies any side effects from medication changes, is aware that Effexor has been stopped and Depakote started. She denies any hallucinations or paranoia, does not seem preoccupied during our conversation today. She does report that she has been eating and drinking well for meals, notes that she no longer thinks about starving herself or dying of dehydration. She does ask when she will be able to discharge. Did review that we need to complete medication adjustments and are also waiting to hear back from IRTS referrals.        MEDICATIONS   Scheduled Meds:   ARIPiprazole  10 mg Oral Daily    divalproex sodium extended-release  1,000 mg Oral At Bedtime    docusate sodium  100 mg Oral BID    LORazepam  2 mg Oral TID    metFORMIN  500 mg Oral BID w/meals    polyethylene glycol  17 g Oral At Bedtime     PRN Meds:.acetaminophen, alum & mag hydroxide-simethicone, hydrOXYzine HCl, melatonin, nicotine, OLANZapine **OR** OLANZapine, ondansetron     ALLERGIES   No Known Allergies     MENTAL STATUS EXAM   Vitals: /79 (BP Location: Right arm)   Pulse 95   Temp 97.1  F (36.2  C) (Tympanic)   Resp 14   SpO2 95%     Appearance:  awake, alert, dressed in hospital scrubs, appeared as age stated, and slightly unkempt  Attitude:  guarded though pleasant  Eye Contact: fair, has improved since admit  Mood: \"okay\", depressed  Affect: flat  Speech:  clear, coherent, less delayed  Psychomotor Behavior:  no evidence of tardive dyskinesia, dystonia, or tics  Thought Process: linear  Associations:  no loose associations   Thought Content: continues to have chronic, passive SI   Insight:  poor  Judgment:  poor  Oriented to:  time, person, and place  Attention Span and Concentration:  limited  Recent " and Remote Memory: fair  Fund of Knowledge: likely below average  Muscle Strength and Tone: normal  Gait and Station: Normal       LABS   No results found for this or any previous visit (from the past 24 hour(s)).      IMPRESSION     This is a 38 year old female with a PMH of MDD, psychosis, IVY, borderline personality disorder, suicide attempts who presents to the ED for evaluation of SI. She reportedly had sent a message to her psychiatrist that alluded to patient having SI. Patient has been hospitalized many times in the past. She has a history of commitment with [facility]ell and shayy merino. A petition for commitment was filed with the county and supported. Today patient continues to report SI, though does state that these feelings are chronic. She denies that there was any particular stressor that prompted her hospitalization. She is in agreement to continue the medications that had been started when she was at [facility]. Effexor XR had been increased to 300 mg for 5 or 6 days though patient reported no significant improvement in depression or anxiety so dose was decreased back to 225 mg daily yesterday. Abilify was added yesterday. It does appear from previous notes that she has been in this in the past. Xanax was switched to Ativan to treat symptoms of catatonia with documented improvement in catatonic symptoms in past few days. Patient is in agreement to continue these medications with changes, she denies any side effects. Did also review that once stable plan will be for IRTS placement, can likely start sending referrals next week.        DIAGNOSES     Major depressive disorder, recurrent, severe, with psychotic features vs. schizoaffective disorder-depressed type  2.   IVY  3.   Borderline personality disorder       PLAN     Location: Unit 5  Legal Status: Orders Placed This Encounter      Legal status Patient is Committed    Safety Assessment:    Behavioral Orders   Procedures    Code 1 - Restrict to Unit     Routine Programming     As clinically indicated    Status 15     Every 15 minutes.      PTA psychotropic medications stopped:     -Xanax-> had been switched to Ativan prior to admit   -Effexor  mg daily-> 150 mg on 2/20 and discontinued on 2/22  -Zyprexa 10 mg at bedtime- stopped    PTA psychotropic medications continued/changed:     -Abilify 2 mg daily-> 5 mg daily on 2/17-> 10 mg on 2/20  -Ativan 2 mg TID for catatonia    New medications tried and stopped:     -none    New medications initiated:     -standard unit PRN medications   -Depakote  mg at bedtime on 2/21-> 1,000 mg on 2/22    Today's Changes:    -no changes today  -check VPA level 2/25    Programming: Patient will be treated in a therapeutic milieu with appropriate individual and group therapies. Education will be provided on diagnoses, medications, and treatments.     Medical diagnoses:  Per medicine    Consult: None  Tests: None    Anticipated LOS: > 7 days  Disposition: likely IRTS when more stable       ATTESTATION    Cora Griffith NP
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
## 2024-02-24 PROCEDURE — 124N000004

## 2024-02-24 PROCEDURE — 99231 SBSQ HOSP IP/OBS SF/LOW 25: CPT | Performed by: NURSE PRACTITIONER

## 2024-02-24 PROCEDURE — 250N000013 HC RX MED GY IP 250 OP 250 PS 637: Performed by: NURSE PRACTITIONER

## 2024-02-24 RX ADMIN — POLYETHYLENE GLYCOL 3350 17 G: 17 POWDER, FOR SOLUTION ORAL at 20:58

## 2024-02-24 RX ADMIN — METFORMIN HYDROCHLORIDE 500 MG: 500 TABLET ORAL at 08:32

## 2024-02-24 RX ADMIN — LORAZEPAM 2 MG: 1 TABLET ORAL at 17:34

## 2024-02-24 RX ADMIN — DIVALPROEX SODIUM 1000 MG: 500 TABLET, EXTENDED RELEASE ORAL at 20:58

## 2024-02-24 RX ADMIN — LORAZEPAM 2 MG: 1 TABLET ORAL at 20:58

## 2024-02-24 RX ADMIN — METFORMIN HYDROCHLORIDE 500 MG: 500 TABLET ORAL at 17:21

## 2024-02-24 RX ADMIN — ARIPIPRAZOLE 10 MG: 10 TABLET ORAL at 08:32

## 2024-02-24 RX ADMIN — DOCUSATE SODIUM 100 MG: 100 CAPSULE, LIQUID FILLED ORAL at 20:58

## 2024-02-24 RX ADMIN — DOCUSATE SODIUM 100 MG: 100 CAPSULE, LIQUID FILLED ORAL at 08:32

## 2024-02-24 RX ADMIN — LORAZEPAM 2 MG: 1 TABLET ORAL at 08:32

## 2024-02-24 ASSESSMENT — ACTIVITIES OF DAILY LIVING (ADL)
ADLS_ACUITY_SCORE: 43
HYGIENE/GROOMING: INDEPENDENT
ADLS_ACUITY_SCORE: 43
DRESS: SCRUBS (BEHAVIORAL HEALTH);INDEPENDENT
ADLS_ACUITY_SCORE: 43
DRESS: SCRUBS (BEHAVIORAL HEALTH);INDEPENDENT
ADLS_ACUITY_SCORE: 43
LAUNDRY: UNABLE TO COMPLETE
ORAL_HYGIENE: INDEPENDENT
ADLS_ACUITY_SCORE: 43
HYGIENE/GROOMING: INDEPENDENT
ADLS_ACUITY_SCORE: 43
ORAL_HYGIENE: INDEPENDENT

## 2024-02-24 NOTE — PLAN OF CARE
Face to face shift report received from nurse. Rounding completed, pt observed.    Problem: Adult Behavioral Health Plan of Care  Goal: Patient-Specific Goal (Individualization)  Description: Pt will attend at least 50% of groups.  Pt will eat at least 50% of meals.  Pt will sleep at least 6-8 hours per night.   Pt will be compliant will ordered medications and treatment team recommendations.    Outcome: Progressing   Pt has been in bed with eyes closed and regular respirations observed all night. Will continue to monitor. Patient slept 7 hours. No issues noted.    Face to face report will be communicated to oncoming RN.    Durga Bishop RN  2/24/2024

## 2024-02-24 NOTE — PROGRESS NOTES
"[facility] PSYCHIATRY  PROGRESS NOTE     SUBJECTIVE     Misty is up  having lunch when I see her today. She notes that she feels \"okay\" today but wants to know when she is leaving.She reports passive SI, compared to admission this has improved. She denies any side effects from medication changes, is aware that Effexor has been stopped and Depakote started. She denies any hallucinations or paranoia, does not seem preoccupied however is not engaged. Appetite good. Did review that we need to complete medication adjustments and are also waiting to hear back from IRTS referrals.        MEDICATIONS   Scheduled Meds:   ARIPiprazole  10 mg Oral Daily    divalproex sodium extended-release  1,000 mg Oral At Bedtime    docusate sodium  100 mg Oral BID    LORazepam  2 mg Oral TID    metFORMIN  500 mg Oral BID w/meals    polyethylene glycol  17 g Oral At Bedtime     PRN Meds:.acetaminophen, alum & mag hydroxide-simethicone, hydrOXYzine HCl, melatonin, nicotine, OLANZapine **OR** OLANZapine, ondansetron     ALLERGIES   No Known Allergies     MENTAL STATUS EXAM   Vitals: /83   Pulse 90   Temp 97.1  F (36.2  C) (Tympanic)   Resp 16   SpO2 95%     Appearance:  awake, alert, dressed in hospital scrubs, appeared as age stated, and slightly unkempt  Attitude:  guarded though pleasant  Eye Contact: fair, has improved since admit  Mood: \"okay\", depressed  Affect: flat  Speech:  clear, coherent, less delayed  Psychomotor Behavior:  no evidence of tardive dyskinesia, dystonia, or tics  Thought Process: linear  Associations:  no loose associations   Thought Content: continues to have chronic, passive SI   Insight:  poor  Judgment:  poor  Oriented to:  time, person, and place  Attention Span and Concentration:  limited  Recent and Remote Memory: fair  Fund of Knowledge: likely below average  Muscle Strength and Tone: normal  Gait and Station: Normal       LABS   No results found for this or any previous visit (from the past 24 " hour(s)).      IMPRESSION     This is a 38 year old female with a PMH of MDD, psychosis, IVY, borderline personality disorder, suicide attempts who presents to the ED for evaluation of SI. She reportedly had sent a message to her psychiatrist that alluded to patient having SI. Patient has been hospitalized many times in the past. She has a history of commitment with lowell and shayy merino. A petition for commitment was filed with the county and supported. Today patient continues to report SI, though does state that these feelings are chronic. She denies that there was any particular stressor that prompted her hospitalization. She is in agreement to continue the medications that had been started when she was at [facility]. Effexor XR had been increased to 300 mg for 5 or 6 days though patient reported no significant improvement in depression or anxiety so dose was decreased back to 225 mg daily yesterday. Abilify was added yesterday. It does appear from previous notes that she has been in this in the past. Xanax was switched to Ativan to treat symptoms of catatonia with documented improvement in catatonic symptoms in past few days. Patient is in agreement to continue these medications with changes, she denies any side effects. Did also review that once stable plan will be for IRTS placement, can likely start sending referrals next week.        DIAGNOSES     Major depressive disorder, recurrent, severe, with psychotic features vs. schizoaffective disorder-depressed type  2.   IVY  3.   Borderline personality disorder       PLAN     Location: Unit 5  Legal Status: Orders Placed This Encounter      Legal status Patient is Committed    Safety Assessment:    Behavioral Orders   Procedures    Code 1 - Restrict to Unit    Routine Programming     As clinically indicated    Status 15     Every 15 minutes.      PTA psychotropic medications stopped:     -Xanax-> had been switched to Ativan prior to admit   -Effexor  mg  daily-> 150 mg on 2/20 and discontinued on 2/22  -Zyprexa 10 mg at bedtime- stopped    PTA psychotropic medications continued/changed:     -Abilify 2 mg daily-> 5 mg daily on 2/17-> 10 mg on 2/20  -Ativan 2 mg TID for catatonia    New medications tried and stopped:     -none    New medications initiated:     -standard unit PRN medications   -Depakote  mg at bedtime on 2/21-> 1,000 mg on 2/22    Today's Changes:    -no changes today  -check VPA level 2/25    Programming: Patient will be treated in a therapeutic milieu with appropriate individual and group therapies. Education will be provided on diagnoses, medications, and treatments.     Medical diagnoses:  Per medicine    Consult: None  Tests: None    Anticipated LOS: > 7 days  Disposition: likely IRTS when more stable       ATTESTATION    Kallie DORSEY, CNP

## 2024-02-24 NOTE — PLAN OF CARE
Problem: Adult Behavioral Health Plan of Care  Goal: Patient-Specific Goal (Individualization)  Description: Pt will attend at least 50% of groups.  Pt will eat at least 50% of meals.  Pt will sleep at least 6-8 hours per night.   Pt will be compliant will ordered medications and treatment team recommendations.    Outcome: Progressing     Problem: Thought Process Alteration  Goal: Optimal Thought Clarity  Description: Pt will communicate in reality based conversations.  Pt will verbalize decrease hallucinations by discharge.   Outcome: Progressing     Problem: Suicide Risk  Goal: Absence of Self-Harm  Outcome: Progressing     Pt denies HI, AH/VH and pain. Pt endorses some anxiety and depression. Pt endorses SI- thoughts only. Pt contracts for safety while on unit. Pt is medication compliant and VSS. Pt ate 100% of dinner. Pt has a flat affect. Pt is calm and alert. Pt is using appropriate behavior with staff and peers.  Pt spent most of shift out in lounge watching tv.     Face to face report will be communicated to oncoming RN.    Christine Grimm RN  2/23/2024

## 2024-02-24 NOTE — PLAN OF CARE
Problem: Adult Behavioral Health Plan of Care  Goal: Patient-Specific Goal (Individualization)  Description: Pt will attend at least 50% of groups.  Pt will eat at least 50% of meals.  Pt will sleep at least 6-8 hours per night.   Pt will be compliant will ordered medications and treatment team recommendations.    Outcome: Progressing     Problem: Thought Process Alteration  Goal: Optimal Thought Clarity  Description: Pt will communicate in reality based conversations.  Pt will verbalize decrease hallucinations by discharge.   Outcome: Progressing     Problem: Suicide Risk  Goal: Absence of Self-Harm  Outcome: Progressing   Goal Outcome Evaluation:    Pt in lounge at the start of the shift pt watching tv. Pt ate her breakfast and lunch in the lounge, stating in the tv most of the shift. Pt refused to go to group when offered. Pt asked staff to go into the group room to sharpen her colored pencils, pt encouraged to go to the group room to get her pencils sharpened., pt refused and went back to bed. Pt reports anxiety at 5/10 and depression at 4/10, pt states she feels her depression is getting better. Pt denies pain, SI, HI and hallucinations.             Face to face end of shift report communicated to evening shift RN. Reported that pt is a risk for suicide.    Goldie Zimmerman, RN  2/24/2024  7:58 AM

## 2024-02-25 PROCEDURE — 250N000013 HC RX MED GY IP 250 OP 250 PS 637: Performed by: NURSE PRACTITIONER

## 2024-02-25 PROCEDURE — 99232 SBSQ HOSP IP/OBS MODERATE 35: CPT | Performed by: NURSE PRACTITIONER

## 2024-02-25 PROCEDURE — 124N000004

## 2024-02-25 RX ADMIN — LORAZEPAM 2 MG: 1 TABLET ORAL at 20:52

## 2024-02-25 RX ADMIN — DOCUSATE SODIUM 100 MG: 100 CAPSULE, LIQUID FILLED ORAL at 09:26

## 2024-02-25 RX ADMIN — LORAZEPAM 2 MG: 1 TABLET ORAL at 13:54

## 2024-02-25 RX ADMIN — ARIPIPRAZOLE 10 MG: 10 TABLET ORAL at 09:26

## 2024-02-25 RX ADMIN — DIVALPROEX SODIUM 1000 MG: 500 TABLET, EXTENDED RELEASE ORAL at 20:52

## 2024-02-25 RX ADMIN — METFORMIN HYDROCHLORIDE 500 MG: 500 TABLET ORAL at 17:06

## 2024-02-25 RX ADMIN — DOCUSATE SODIUM 100 MG: 100 CAPSULE, LIQUID FILLED ORAL at 20:52

## 2024-02-25 RX ADMIN — METFORMIN HYDROCHLORIDE 500 MG: 500 TABLET ORAL at 09:26

## 2024-02-25 RX ADMIN — LORAZEPAM 2 MG: 1 TABLET ORAL at 09:26

## 2024-02-25 RX ADMIN — POLYETHYLENE GLYCOL 3350 17 G: 17 POWDER, FOR SOLUTION ORAL at 20:52

## 2024-02-25 ASSESSMENT — ACTIVITIES OF DAILY LIVING (ADL)
ADLS_ACUITY_SCORE: 43
HYGIENE/GROOMING: INDEPENDENT
ADLS_ACUITY_SCORE: 43
ORAL_HYGIENE: INDEPENDENT
ADLS_ACUITY_SCORE: 43
DRESS: SCRUBS (BEHAVIORAL HEALTH);INDEPENDENT
ADLS_ACUITY_SCORE: 43
HYGIENE/GROOMING: INDEPENDENT
ADLS_ACUITY_SCORE: 43
DRESS: SCRUBS (BEHAVIORAL HEALTH);INDEPENDENT
ORAL_HYGIENE: INDEPENDENT
ADLS_ACUITY_SCORE: 43
ADLS_ACUITY_SCORE: 43

## 2024-02-25 NOTE — PLAN OF CARE
Problem: Adult Behavioral Health Plan of Care  Goal: Patient-Specific Goal (Individualization)  Description: Pt will attend at least 50% of groups.  Pt will eat at least 50% of meals.  Pt will sleep at least 6-8 hours per night.   Pt will be compliant will ordered medications and treatment team recommendations.    Outcome: Progressing  Note:     0930 Patient up on the unit and attends groups. Patient is alert, tells writer that she still has suicidal thoughts but states she is drinking fluids now. Patient states that she has a pcp and a psychiatrist at home in [facility] but does not see a therapist. Patient states that she has trouble with voicing when she is suicidal because she doesn't want to worry her parents. Patient is hoping that she go home soon. Patient ate well for meals. No other complaints offered.    1400 Patient in room resting at this time. Has had no complaints.    Face to face end of shift report communicated to 3-1130 shift RN.     Kim Kellogg RN  2/25/2024  2:16 PM         Problem: Thought Process Alteration  Goal: Optimal Thought Clarity  Description: Pt will communicate in reality based conversations.  Pt will verbalize decrease hallucinations by discharge.   Outcome: Progressing     Problem: Suicide Risk  Goal: Absence of Self-Harm  Outcome: Progressing      Goal Outcome Evaluation:    Plan of Care Reviewed With: patient
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
## 2024-02-25 NOTE — PROGRESS NOTES
"[facility] PSYCHIATRY  PROGRESS NOTE     SUBJECTIVE     Misty is out coloring today when we spoke. She was working on a maze and went into great detail regarding the colors and why she choose them, this was entirely spontaneous. She did smile X2-she otherwise has a blunted appearance. Her passive SI have \"almost completely gone\". Appetite good.   Pt asked about leaving then quickly added \"that's right you are not the  and they are coming tomorrow\". Pt was reminded of stabilizing her medications and that referral have been sent to [facility] facility's.         MEDICATIONS   Scheduled Meds:   ARIPiprazole  10 mg Oral Daily    divalproex sodium extended-release  1,000 mg Oral At Bedtime    docusate sodium  100 mg Oral BID    LORazepam  2 mg Oral TID    metFORMIN  500 mg Oral BID w/meals    polyethylene glycol  17 g Oral At Bedtime     PRN Meds:.acetaminophen, alum & mag hydroxide-simethicone, hydrOXYzine HCl, melatonin, nicotine, OLANZapine **OR** OLANZapine, ondansetron     ALLERGIES   No Known Allergies     MENTAL STATUS EXAM   Vitals: /61   Pulse 100   Temp 98.1  F (36.7  C) (Temporal)   Resp 16   SpO2 97%     Appearance:  awake, alert, dressed in hospital scrubs, appeared as age stated, and slightly unkempt  Attitude:  guarded though pleasant  Eye Contact: fair, has improved since admit  Mood: \"okay\", depressed \"but a little better\"  Affect: flat  Speech:  clear, coherent, less delayed  Psychomotor Behavior:  no evidence of tardive dyskinesia, dystonia, or tics  Thought Process: linear  Associations:  no loose associations   Thought Content: continues to have chronic, passive SI   Insight:  poor  Judgment:  poor  Oriented to:  time, person, and place  Attention Span and Concentration:  limited  Recent and Remote Memory: fair  Fund of Knowledge: likely below average  Muscle Strength and Tone: normal  Gait and Station: Normal       LABS   No results found for this or any previous visit (from the " past 24 hour(s)).      IMPRESSION     This is a 38 year old female with a PMH of MDD, psychosis, IVY, borderline personality disorder, suicide attempts who presents to the ED for evaluation of SI. She reportedly had sent a message to her psychiatrist that alluded to patient having SI. Patient has been hospitalized many times in the past. She has a history of commitment with lowell and shayy merino. A petition for commitment was filed with the county and supported. Today patient continues to report SI, though does state that these feelings are chronic. She denies that there was any particular stressor that prompted her hospitalization. She is in agreement to continue the medications that had been started when she was at [facility]. Effexor XR had been increased to 300 mg for 5 or 6 days though patient reported no significant improvement in depression or anxiety so dose was decreased back to 225 mg daily yesterday. Abilify was added yesterday. It does appear from previous notes that she has been in this in the past. Xanax was switched to Ativan to treat symptoms of catatonia with documented improvement in catatonic symptoms in past few days. Patient is in agreement to continue these medications with changes, she denies any side effects. Did also review that once stable plan will be for IRTS placement, can likely start sending referrals next week.        DIAGNOSES     Major depressive disorder, recurrent, severe, with psychotic features vs. schizoaffective disorder-depressed type  2.   IVY  3.   Borderline personality disorder       PLAN     Location: Unit 5  Legal Status: Orders Placed This Encounter      Legal status Patient is Committed    Safety Assessment:    Behavioral Orders   Procedures    Code 1 - Restrict to Unit    Routine Programming     As clinically indicated    Status 15     Every 15 minutes.      PTA psychotropic medications stopped:     -Xanax-> had been switched to Ativan prior to admit   -Effexor XR  225 mg daily-> 150 mg on 2/20 and discontinued on 2/22  -Zyprexa 10 mg at bedtime- stopped    PTA psychotropic medications continued/changed:     -Abilify 2 mg daily-> 5 mg daily on 2/17-> 10 mg on 2/20  -Ativan 2 mg TID for catatonia    New medications tried and stopped:     -none    New medications initiated:     -standard unit PRN medications   -Depakote  mg at bedtime on 2/21-> 1,000 mg on 2/22    Today's Changes:    -no changes today  -check VPA level 2/25    Programming: Patient will be treated in a therapeutic milieu with appropriate individual and group therapies. Education will be provided on diagnoses, medications, and treatments.     Medical diagnoses:  Per medicine    Consult: None  Tests: None    Anticipated LOS: > 7 days  Disposition: likely IRTS when more stable       ATTESTATION    Kallie Jenkins APRARSALAN, CNP

## 2024-02-25 NOTE — PLAN OF CARE
Face to face shift report received from nurse. Rounding completed, pt observed.    Problem: Adult Behavioral Health Plan of Care  Goal: Patient-Specific Goal (Individualization)  Description: Pt will attend at least 50% of groups.  Pt will eat at least 50% of meals.  Pt will sleep at least 6-8 hours per night.   Pt will be compliant will ordered medications and treatment team recommendations.    Outcome: Progressing   Pt has been in bed with eyes closed and regular respirations observed all night. Will continue to monitor. Patient slept 7 hours no issues noted.     Face to face report will be communicated to oncoming RN.    Durga Bishop RN  2/25/2024

## 2024-02-26 LAB
ALBUMIN SERPL BCG-MCNC: 4.1 G/DL (ref 3.5–5.2)
ALP SERPL-CCNC: 82 U/L (ref 40–150)
ALT SERPL W P-5'-P-CCNC: 30 U/L (ref 0–50)
ANION GAP SERPL CALCULATED.3IONS-SCNC: 13 MMOL/L (ref 7–15)
AST SERPL W P-5'-P-CCNC: 28 U/L (ref 0–45)
BILIRUB SERPL-MCNC: 0.3 MG/DL
BUN SERPL-MCNC: 11.4 MG/DL (ref 6–20)
CALCIUM SERPL-MCNC: 9.7 MG/DL (ref 8.6–10)
CHLORIDE SERPL-SCNC: 100 MMOL/L (ref 98–107)
CREAT SERPL-MCNC: 0.83 MG/DL (ref 0.51–0.95)
DEPRECATED HCO3 PLAS-SCNC: 26 MMOL/L (ref 22–29)
EGFRCR SERPLBLD CKD-EPI 2021: >90 ML/MIN/1.73M2
ERYTHROCYTE [DISTWIDTH] IN BLOOD BY AUTOMATED COUNT: 14.8 % (ref 10–15)
GLUCOSE SERPL-MCNC: 100 MG/DL (ref 70–99)
HCT VFR BLD AUTO: 42.6 % (ref 35–47)
HGB BLD-MCNC: 13.8 G/DL (ref 11.7–15.7)
MCH RBC QN AUTO: 29.2 PG (ref 26.5–33)
MCHC RBC AUTO-ENTMCNC: 32.4 G/DL (ref 31.5–36.5)
MCV RBC AUTO: 90 FL (ref 78–100)
PLATELET # BLD AUTO: 353 10E3/UL (ref 150–450)
POTASSIUM SERPL-SCNC: 4.1 MMOL/L (ref 3.4–5.3)
PROT SERPL-MCNC: 7.5 G/DL (ref 6.4–8.3)
RBC # BLD AUTO: 4.73 10E6/UL (ref 3.8–5.2)
SODIUM SERPL-SCNC: 139 MMOL/L (ref 135–145)
VALPROATE SERPL-MCNC: 92.4 UG/ML
WBC # BLD AUTO: 9.4 10E3/UL (ref 4–11)

## 2024-02-26 PROCEDURE — 250N000013 HC RX MED GY IP 250 OP 250 PS 637: Performed by: NURSE PRACTITIONER

## 2024-02-26 PROCEDURE — 80164 ASSAY DIPROPYLACETIC ACD TOT: CPT | Performed by: NURSE PRACTITIONER

## 2024-02-26 PROCEDURE — 85027 COMPLETE CBC AUTOMATED: CPT | Performed by: NURSE PRACTITIONER

## 2024-02-26 PROCEDURE — 124N000004

## 2024-02-26 PROCEDURE — 36415 COLL VENOUS BLD VENIPUNCTURE: CPT | Performed by: NURSE PRACTITIONER

## 2024-02-26 PROCEDURE — 99232 SBSQ HOSP IP/OBS MODERATE 35: CPT | Performed by: NURSE PRACTITIONER

## 2024-02-26 PROCEDURE — 80053 COMPREHEN METABOLIC PANEL: CPT | Performed by: NURSE PRACTITIONER

## 2024-02-26 RX ADMIN — DIVALPROEX SODIUM 1000 MG: 500 TABLET, EXTENDED RELEASE ORAL at 21:43

## 2024-02-26 RX ADMIN — POLYETHYLENE GLYCOL 3350 17 G: 17 POWDER, FOR SOLUTION ORAL at 21:52

## 2024-02-26 RX ADMIN — LORAZEPAM 2 MG: 1 TABLET ORAL at 13:16

## 2024-02-26 RX ADMIN — METFORMIN HYDROCHLORIDE 500 MG: 500 TABLET ORAL at 17:46

## 2024-02-26 RX ADMIN — ARIPIPRAZOLE 10 MG: 10 TABLET ORAL at 09:07

## 2024-02-26 RX ADMIN — DOCUSATE SODIUM 100 MG: 100 CAPSULE, LIQUID FILLED ORAL at 21:43

## 2024-02-26 RX ADMIN — LORAZEPAM 2 MG: 1 TABLET ORAL at 21:43

## 2024-02-26 RX ADMIN — DOCUSATE SODIUM 100 MG: 100 CAPSULE, LIQUID FILLED ORAL at 09:07

## 2024-02-26 RX ADMIN — METFORMIN HYDROCHLORIDE 500 MG: 500 TABLET ORAL at 09:07

## 2024-02-26 RX ADMIN — LORAZEPAM 2 MG: 1 TABLET ORAL at 09:07

## 2024-02-26 ASSESSMENT — ACTIVITIES OF DAILY LIVING (ADL)
ADLS_ACUITY_SCORE: 43
ORAL_HYGIENE: INDEPENDENT
DRESS: SCRUBS (BEHAVIORAL HEALTH);INDEPENDENT
ADLS_ACUITY_SCORE: 43
HYGIENE/GROOMING: INDEPENDENT
ADLS_ACUITY_SCORE: 43

## 2024-02-26 NOTE — PLAN OF CARE
Problem: Adult Behavioral Health Plan of Care  Goal: Patient-Specific Goal (Individualization)  Description: Pt will attend at least 50% of groups.  Pt will eat at least 50% of meals.  Pt will sleep at least 6-8 hours per night.   Pt will be compliant will ordered medications and treatment team recommendations.    Outcome: Progressing   Goal Outcome Evaluation:        Face to face shift report received from RN. Rounding completed, pt observed.Client rested in room for 7 hours with eyes closed and respirations noted.Face to face report will be communicated to oncoming RN.    Janes Mckinney RN  2/26/2024  6:10 AM

## 2024-02-26 NOTE — PLAN OF CARE
Problem: Adult Behavioral Health Plan of Care  Goal: Patient-Specific Goal (Individualization)  Description: Pt will attend at least 50% of groups.  Pt will eat at least 50% of meals.  Pt will sleep at least 6-8 hours per night.   Pt will be compliant will ordered medications and treatment team recommendations.    Outcome: Progressing     Problem: Thought Process Alteration  Goal: Optimal Thought Clarity  Description: Pt will communicate in reality based conversations.  Pt will verbalize decrease hallucinations by discharge.   Outcome: Progressing     Problem: Suicide Risk  Goal: Absence of Self-Harm  Outcome: Progressing   Goal Outcome Evaluation:    Pt in the lounge at the start of the shift. Pt ate meals in the lounge. Pt attended groups in the morning and napped in the afternoon. Pt reports not having slept well last night. Discussed how sleeping in the day time may affect how she sleeps at night. Pt denied pain, depression, HI and hallucinations. Pt reports high anxiety and some fleeting suicidal thoughts.                Face to face end of shift report communicated to evening shift RN. Reported that pt is a risk for suicide.     Goldie Zimmerman RN  2/26/2024  7:59 AM

## 2024-02-26 NOTE — PLAN OF CARE
"Problem: Adult Behavioral Health Plan of Care  Goal: Patient-Specific Goal (Individualization)  Description: Pt will attend at least 50% of groups.  Pt will eat at least 50% of meals.  Pt will sleep at least 6-8 hours per night.   Pt will be compliant will ordered medications and treatment team recommendations.  Outcome: Not Progressing  Note: Prior to supper, pt stated \"I don't know why dying is considered so bad. It's peaceful.\" Pt reported that she is still having SI plan of \"dehydration\" because that is \"the only thing I can do here.\" Pt has been drinking and eating. Pt could not verbalize any protective factors or deterrents. She stated \"if I , I could be with my kids more. I could be their guardian anmol and guide them.\" Pt's sons are currently living with her sister. Pt informed writer that this is her third time in the hospital. She stated \"the first time I was in the hospital, I had ECT. The second time, I had court ordered ECT. My mood was lifted a little bit, but I have memory issues.\" Pt reported that she is \"not okay\" with plan to go to an IRTS. She stated \"I want to go back to my mom's house.\" Pt was compliant with her scheduled medications.    Face to face end of shift report will be communicated to oncoming RN.      Problem: Suicide Risk  Goal: Absence of Self-Harm  Outcome: Not Progressing     Goal Outcome Evaluation:    Plan of Care Reviewed With: patient      "

## 2024-02-26 NOTE — PROGRESS NOTES
"[facility] PSYCHIATRY  PROGRESS NOTE     SUBJECTIVE     Misty is resting in bed when I see her today. She notes that she is feeling \"fine\" today. She reports vague SI, no current plan or intent. She reports that she has been eating and drinking well, no concerns regarding restriction of intake. Will discontinue I&Os. Patient denies any side effects from medication. Reviewed that she would have labs ordered for this evening prior to next Depakote dose. She did attend group this morning, encouraged her to continue to attend groups this afternoon as well. She notes that she did place a call to her [facility] , had to leave a message and is waiting for a call back. She notes that she would like to avoid going to an IRTS program and would prefer to go back home with mother.        MEDICATIONS   Scheduled Meds:   ARIPiprazole  10 mg Oral Daily    divalproex sodium extended-release  1,000 mg Oral At Bedtime    docusate sodium  100 mg Oral BID    LORazepam  2 mg Oral TID    metFORMIN  500 mg Oral BID w/meals    polyethylene glycol  17 g Oral At Bedtime     PRN Meds:.acetaminophen, alum & mag hydroxide-simethicone, hydrOXYzine HCl, melatonin, nicotine, OLANZapine **OR** OLANZapine, ondansetron     ALLERGIES   No Known Allergies     MENTAL STATUS EXAM   Vitals: /83 (BP Location: Left arm)   Pulse 89   Temp 97.6  F (36.4  C) (Tympanic)   Resp 12   SpO2 94%     Appearance:  awake, alert, dressed in hospital scrubs, appeared as age stated, and slightly unkempt  Attitude:  guarded though pleasant  Eye Contact: fair  Mood: \"okay\", feels that depression has improved since admit  Affect: flat  Speech:  clear, coherent, less delayed  Psychomotor Behavior:  no evidence of tardive dyskinesia, dystonia, or tics  Thought Process: linear  Associations:  no loose associations   Thought Content: continues to have chronic, passive SI. Denies hallucinations  Insight: limited  Judgment: limited  Oriented to:  time, " person, and place  Attention Span and Concentration:  limited  Recent and Remote Memory: fair  Fund of Knowledge: likely below average  Muscle Strength and Tone: normal  Gait and Station: Normal       LABS   No results found for this or any previous visit (from the past 24 hour(s)).      IMPRESSION     This is a 38 year old female with a PMH of MDD, psychosis, IVY, borderline personality disorder, suicide attempts who presents to the ED for evaluation of SI. She reportedly had sent a message to her psychiatrist that alluded to patient having SI. Patient has been hospitalized many times in the past. She has a history of commitment with lowell and shayy merino. A petition for commitment was filed with the county and supported. Today patient continues to report SI, though does state that these feelings are chronic. She denies that there was any particular stressor that prompted her hospitalization. She is in agreement to continue the medications that had been started when she was at [facility]. Effexor XR had been increased to 300 mg for 5 or 6 days though patient reported no significant improvement in depression or anxiety so dose was decreased back to 225 mg daily yesterday. Abilify was added yesterday. It does appear from previous notes that she has been in this in the past. Xanax was switched to Ativan to treat symptoms of catatonia with documented improvement in catatonic symptoms in past few days. Patient is in agreement to continue these medications with changes, she denies any side effects. Did also review that once stable plan will be for IRTS placement, can likely start sending referrals next week.        DIAGNOSES     Major depressive disorder, recurrent, severe, with psychotic features  2.   IVY  3.   Borderline personality disorder       PLAN     Location: Unit 5  Legal Status: Orders Placed This Encounter      Legal status Patient is Committed    Safety Assessment:    Behavioral Orders   Procedures     Code 1 - Restrict to Unit    Routine Programming     As clinically indicated    Status 15     Every 15 minutes.      PTA psychotropic medications stopped:     -Xanax-> had been switched to Ativan prior to admit   -Effexor  mg daily-> 150 mg on 2/20 and discontinued on 2/22  -Zyprexa 10 mg at bedtime- stopped    PTA psychotropic medications continued/changed:     -Abilify 2 mg daily-> 5 mg daily on 2/17-> 10 mg on 2/20  -Ativan 2 mg TID for catatonia    New medications tried and stopped:     -none    New medications initiated:     -standard unit PRN medications   -Depakote  mg at bedtime on 2/21-> 1,000 mg on 2/22    Today's Changes:    -no changes today  -check VPA level, CMP, CBC on 2/26    Programming: Patient will be treated in a therapeutic milieu with appropriate individual and group therapies. Education will be provided on diagnoses, medications, and treatments.     Medical diagnoses:  Per medicine    Consult: None  Tests: None    Anticipated LOS: > 7 days  Disposition: likely IRTS placement       TREATMENT TEAM CARE PLAN     Progress: Continued symptoms. Less delayed, reports feeling improvement in mood. Chronic, passive SI.    Continued Stay Criteria/Rationale: Continued symptoms without sufficient improvement/resolution.    Medical/Physical: See above.    Precautions: See above.     Plan: Continue inpatient care with unit support and medication management.    Rationale for change in precautions or plan: NA due to no change.    Participants: Cora Griffith, NP, Nursing, SW, OT.    The patient's care was discussed with the treatment team and chart notes were reviewed.        ATTESTATION    Cora Griffith APRN, CNP

## 2024-02-27 PROCEDURE — 250N000013 HC RX MED GY IP 250 OP 250 PS 637: Performed by: NURSE PRACTITIONER

## 2024-02-27 PROCEDURE — 124N000004

## 2024-02-27 PROCEDURE — 99232 SBSQ HOSP IP/OBS MODERATE 35: CPT | Performed by: NURSE PRACTITIONER

## 2024-02-27 RX ADMIN — DOCUSATE SODIUM 100 MG: 100 CAPSULE, LIQUID FILLED ORAL at 20:22

## 2024-02-27 RX ADMIN — POLYETHYLENE GLYCOL 3350 17 G: 17 POWDER, FOR SOLUTION ORAL at 20:22

## 2024-02-27 RX ADMIN — METFORMIN HYDROCHLORIDE 500 MG: 500 TABLET ORAL at 08:08

## 2024-02-27 RX ADMIN — METFORMIN HYDROCHLORIDE 500 MG: 500 TABLET ORAL at 16:56

## 2024-02-27 RX ADMIN — DOCUSATE SODIUM 100 MG: 100 CAPSULE, LIQUID FILLED ORAL at 08:07

## 2024-02-27 RX ADMIN — LORAZEPAM 2 MG: 1 TABLET ORAL at 08:08

## 2024-02-27 RX ADMIN — LORAZEPAM 2 MG: 1 TABLET ORAL at 20:22

## 2024-02-27 RX ADMIN — LORAZEPAM 2 MG: 1 TABLET ORAL at 13:05

## 2024-02-27 RX ADMIN — ARIPIPRAZOLE 10 MG: 10 TABLET ORAL at 08:08

## 2024-02-27 RX ADMIN — DIVALPROEX SODIUM 1000 MG: 500 TABLET, EXTENDED RELEASE ORAL at 20:22

## 2024-02-27 ASSESSMENT — ACTIVITIES OF DAILY LIVING (ADL)
ADLS_ACUITY_SCORE: 43
ADLS_ACUITY_SCORE: 43
LAUNDRY: UNABLE TO COMPLETE
ADLS_ACUITY_SCORE: 43
DRESS: SCRUBS (BEHAVIORAL HEALTH);INDEPENDENT
ADLS_ACUITY_SCORE: 43
HYGIENE/GROOMING: INDEPENDENT
ADLS_ACUITY_SCORE: 43
ADLS_ACUITY_SCORE: 43
ORAL_HYGIENE: INDEPENDENT
DRESS: SCRUBS (BEHAVIORAL HEALTH);INDEPENDENT
ADLS_ACUITY_SCORE: 43
HYGIENE/GROOMING: INDEPENDENT
LAUNDRY: UNABLE TO COMPLETE
ADLS_ACUITY_SCORE: 43
ORAL_HYGIENE: INDEPENDENT
ADLS_ACUITY_SCORE: 43

## 2024-02-27 NOTE — PROGRESS NOTES
Pt had phone interview with [facility] IRTS today 2/27. Writer will check in with them if they do not reach out today about how it went.

## 2024-02-27 NOTE — PLAN OF CARE
Problem: Adult Behavioral Health Plan of Care  Goal: Patient-Specific Goal (Individualization)  Description: Pt will attend at least 50% of groups.  Pt will eat at least 50% of meals.  Pt will sleep at least 6-8 hours per night.   Pt will be compliant will ordered medications and treatment team recommendations.    Outcome: Progressing   Goal Outcome Evaluation:         Face to face shift report received from RN. Rounding completed, pt observed.Client rested in room for 6 hours with eyes closed and respirations noted.Face to face report will be communicated to oncoming RN.    Janes Mckinney RN  2/27/2024  6:07 AM

## 2024-02-27 NOTE — PLAN OF CARE
Face to face shift report received from LANA Marrero. Rounding completed, pt observed sitting in lounge at start of shift.  Problem: Adult Behavioral Health Plan of Care  Goal: Patient-Specific Goal (Individualization)  Description: Pt will attend at least 50% of groups.  Pt will eat at least 50% of meals.  Pt will sleep at least 6-8 hours per night.   Pt will be compliant will ordered medications and treatment team recommendations.    Outcome: Progressing     Problem: Thought Process Alteration  Goal: Optimal Thought Clarity  Description: Pt will communicate in reality based conversations.  Pt will verbalize decrease hallucinations by discharge.   Outcome: Progressing     Problem: Suicide Risk  Goal: Absence of Self-Harm  Description: Pt will remain free from self harm while hospitalized.  Pt will verbalize a safety plan and deny suicidal ideation prior to discharge.   Outcome: Progressing   Goal Outcome Evaluation:        Pt. Had some physical pain in their neck this shift. They declined any PRN medications for this. An order was received and hot pack given for this. Pt. Had some minor anxiety and depression this shift, but stated that they were feeling better. Pt. Denied having any SI or intent to self-harm this shift. Pt. Was out in lounge for a decent amount this shift watching television. No groups were attended this shift. 100% was eaten at breakfast and 100% at lunch.    Face to face report will be communicated to oncoming RN.    Kaleigh Iraheta RN  2/27/2024  2:17 PM

## 2024-02-27 NOTE — PROGRESS NOTES
"[facility] PSYCHIATRY  PROGRESS NOTE     SUBJECTIVE     Misty is up in the lounge when I see her today. She notes that she feels anxious because she has not seen the SW in a few days and wonders what the plan is. Did review that IRTS is likely plan, she is encouraged to contact her  with further questions. Did discuss that she ended up in the hospital after her DBT program ended and she was just at home with no structure in her day when living with mother. Did review that she does better when doing some sort of programming or having some structure during the day. She does agree with this. VPA level was drawn last night and therapeutic. Patient denies any issues with medications, no reported side effects. Her intake has been good. Vague SI though feels safe. Reports sleeping \"okay\" at night.       MEDICATIONS   Scheduled Meds:   ARIPiprazole  10 mg Oral Daily    divalproex sodium extended-release  1,000 mg Oral At Bedtime    docusate sodium  100 mg Oral BID    LORazepam  2 mg Oral TID    metFORMIN  500 mg Oral BID w/meals    polyethylene glycol  17 g Oral At Bedtime     PRN Meds:.acetaminophen, alum & mag hydroxide-simethicone, hydrOXYzine HCl, melatonin, nicotine, OLANZapine **OR** OLANZapine, ondansetron     ALLERGIES   No Known Allergies     MENTAL STATUS EXAM   Vitals: /74 (Patient Position: Sitting)   Pulse 89   Temp 98.6  F (37  C) (Temporal)   Resp 14   SpO2 96%     Appearance:  awake, alert, dressed in hospital scrubs, appeared as age stated, and slightly unkempt  Attitude:  guarded though pleasant  Eye Contact: fair  Mood: \"okay\", feels that depression has improved since admit  Affect: flat  Speech:  clear, coherent  Psychomotor Behavior:  no evidence of tardive dyskinesia, dystonia, or tics  Thought Process: linear, more goal oriented  Associations:  no loose associations   Thought Content: continues to have chronic, passive SI. Denies hallucinations  Insight: limited  Judgment: " limited  Oriented to:  time, person, and place  Attention Span and Concentration:  limited  Recent and Remote Memory: fair  Fund of Knowledge: likely below average  Muscle Strength and Tone: normal  Gait and Station: Normal       LABS   Recent Results (from the past 24 hour(s))   Valproic acid    Collection Time: 02/26/24  8:27 PM   Result Value Ref Range    Valproic acid 92.4   ug/mL   CBC with platelets    Collection Time: 02/26/24  8:27 PM   Result Value Ref Range    WBC Count 9.4 4.0 - 11.0 10e3/uL    RBC Count 4.73 3.80 - 5.20 10e6/uL    Hemoglobin 13.8 11.7 - 15.7 g/dL    Hematocrit 42.6 35.0 - 47.0 %    MCV 90 78 - 100 fL    MCH 29.2 26.5 - 33.0 pg    MCHC 32.4 31.5 - 36.5 g/dL    RDW 14.8 10.0 - 15.0 %    Platelet Count 353 150 - 450 10e3/uL   Comprehensive metabolic panel    Collection Time: 02/26/24  8:27 PM   Result Value Ref Range    Sodium 139 135 - 145 mmol/L    Potassium 4.1 3.4 - 5.3 mmol/L    Carbon Dioxide (CO2) 26 22 - 29 mmol/L    Anion Gap 13 7 - 15 mmol/L    Urea Nitrogen 11.4 6.0 - 20.0 mg/dL    Creatinine 0.83 0.51 - 0.95 mg/dL    GFR Estimate >90 >60 mL/min/1.73m2    Calcium 9.7 8.6 - 10.0 mg/dL    Chloride 100 98 - 107 mmol/L    Glucose 100 (H) 70 - 99 mg/dL    Alkaline Phosphatase 82 40 - 150 U/L    AST 28 0 - 45 U/L    ALT 30 0 - 50 U/L    Protein Total 7.5 6.4 - 8.3 g/dL    Albumin 4.1 3.5 - 5.2 g/dL    Bilirubin Total 0.3 <=1.2 mg/dL         IMPRESSION     This is a 38 year old female with a PMH of MDD, psychosis, IVY, borderline personality disorder, suicide attempts who presents to the ED for evaluation of SI. She reportedly had sent a message to her psychiatrist that alluded to patient having SI. Patient has been hospitalized many times in the past. She has a history of commitment with [facility] and shayy merino. A petition for commitment was filed with the county and supported. Today patient continues to report SI, though does state that these feelings are chronic. She denies that  there was any particular stressor that prompted her hospitalization. She is in agreement to continue the medications that had been started when she was at [facility]. Effexor XR had been increased to 300 mg for 5 or 6 days though patient reported no significant improvement in depression or anxiety so dose was decreased back to 225 mg daily yesterday. Abilify was added yesterday. It does appear from previous notes that she has been in this in the past. Xanax was switched to Ativan to treat symptoms of catatonia with documented improvement in catatonic symptoms in past few days. Patient is in agreement to continue these medications with changes, she denies any side effects. Did also review that once stable plan will be for IRTS placement, can likely start sending referrals next week.        DIAGNOSES     Major depressive disorder, recurrent, severe, with psychotic features  2.   IVY  3.   Borderline personality disorder       PLAN     Location: Unit 5  Legal Status: Orders Placed This Encounter      Legal status Patient is Committed    Safety Assessment:    Behavioral Orders   Procedures    Code 1 - Restrict to Unit    Routine Programming     As clinically indicated    Status 15     Every 15 minutes.      PTA psychotropic medications stopped:     -Xanax-> had been switched to Ativan prior to admit   -Effexor  mg daily-> 150 mg on 2/20 and discontinued on 2/22  -Zyprexa 10 mg at bedtime- stopped    PTA psychotropic medications continued/changed:     -Abilify 2 mg daily-> 5 mg daily on 2/17-> 10 mg on 2/20  -Ativan 2 mg TID for catatonia    New medications tried and stopped:     -none    New medications initiated:     -standard unit PRN medications   -Depakote  mg at bedtime on 2/21-> 1,000 mg on 2/22    Today's Changes:    -no changes today  -discontinued I&Os, intake has been good    Programming: Patient will be treated in a therapeutic milieu with appropriate individual and group therapies. Education will  be provided on diagnoses, medications, and treatments.     Medical diagnoses:  Per medicine    Consult: None  Tests: VPA level on 2/26- 92.4    Anticipated LOS: > 7 days  Disposition: likely IRTS placement       ATTESTATION    Cora DORSEY, CNP

## 2024-02-27 NOTE — PROGRESS NOTES
[facility] IRTS emailed writer and would like to do a phone interview with lynn MILAN.

## 2024-02-27 NOTE — PLAN OF CARE
"Problem: Adult Behavioral Health Plan of Care  Goal: Patient-Specific Goal (Individualization)  Description: Pt will attend at least 50% of groups.  Pt will eat at least 50% of meals.  Pt will sleep at least 6-8 hours per night.   Pt will be compliant will ordered medications and treatment team recommendations.  2/26/2024 1938 by Kim Dumont, RN  Outcome: Progressing  Note: Pt had a flat affect. She endorsed ongoing anxiety and depression, but reported that she is doing \"better.\" She denied any SI or thoughts of self harm tonight. She participated in two groups this morning, but none this afternoon. She ate 100% of supper. She was compliant with her scheduled medications. Valproic acid level was 92.4.     Face to face end of shift report will be communicated to oncoming RN.      Problem: Thought Process Alteration  Goal: Optimal Thought Clarity  Description: Pt will communicate in reality based conversations.  Pt will verbalize decrease hallucinations by discharge.   2/26/2024 1938 by Kim Dumont, RN  Outcome: Progressing     Problem: Suicide Risk  Goal: Absence of Self-Harm  Description: Pt will remain free from self harm while hospitalized.  Pt will verbalize a safety plan and deny suicidal ideation prior to discharge.   2/26/2024 1938 by Kim Dumont, RN  Outcome: Progressing     Goal Outcome Evaluation:    Plan of Care Reviewed With: patient      "

## 2024-02-27 NOTE — PLAN OF CARE
Problem: Adult Behavioral Health Plan of Care  Goal: Patient-Specific Goal (Individualization)  Description: Pt will attend at least 50% of groups.  Pt will eat at least 50% of meals.  Pt will sleep at least 6-8 hours per night.   Pt will be compliant will ordered medications and treatment team recommendations.    Outcome: Progressing    Patient cooperative with assessment. Reports some anxiety and depression but is manageable. Denies SI HI pain and hallucinations. Spends majority of shift in lounge or in room. Withdrawn to self. Compliant with medications without issues. Makes needs known.      Problem: Thought Process Alteration  Goal: Optimal Thought Clarity  Description: Pt will communicate in reality based conversations.  Pt will verbalize decrease hallucinations by discharge.   Outcome: Progressing     Problem: Suicide Risk  Goal: Absence of Self-Harm  Description: Pt will remain free from self harm while hospitalized.  Pt will verbalize a safety plan and deny suicidal ideation prior to discharge.   Outcome: Progressing     Face to face report given with opportunity to observe patient. Patient in lounge.     Report given to night shift RN.     Isabel Espinoza RN   2/27/2024

## 2024-02-28 PROCEDURE — 124N000004

## 2024-02-28 PROCEDURE — 250N000013 HC RX MED GY IP 250 OP 250 PS 637: Performed by: NURSE PRACTITIONER

## 2024-02-28 PROCEDURE — 99232 SBSQ HOSP IP/OBS MODERATE 35: CPT | Performed by: NURSE PRACTITIONER

## 2024-02-28 RX ORDER — POLYETHYLENE GLYCOL 3350 17 G/17G
17 POWDER, FOR SOLUTION ORAL DAILY PRN
Status: DISCONTINUED | OUTPATIENT
Start: 2024-02-28 | End: 2024-03-01 | Stop reason: HOSPADM

## 2024-02-28 RX ORDER — DOCUSATE SODIUM 100 MG/1
100 CAPSULE, LIQUID FILLED ORAL 2 TIMES DAILY
Qty: 60 CAPSULE | Refills: 0 | Status: SHIPPED | OUTPATIENT
Start: 2024-02-28

## 2024-02-28 RX ORDER — ARIPIPRAZOLE 10 MG/1
10 TABLET ORAL DAILY
Qty: 30 TABLET | Refills: 0 | Status: SHIPPED | OUTPATIENT
Start: 2024-02-29 | End: 2024-08-27

## 2024-02-28 RX ORDER — LORAZEPAM 2 MG/1
2 TABLET ORAL 3 TIMES DAILY
Qty: 90 TABLET | Refills: 0 | Status: SHIPPED | OUTPATIENT
Start: 2024-02-28 | End: 2024-08-27

## 2024-02-28 RX ORDER — DIVALPROEX SODIUM 500 MG/1
1000 TABLET, EXTENDED RELEASE ORAL AT BEDTIME
Qty: 60 TABLET | Refills: 0 | Status: SHIPPED | OUTPATIENT
Start: 2024-02-28

## 2024-02-28 RX ORDER — HYDROXYZINE HYDROCHLORIDE 25 MG/1
25 TABLET, FILM COATED ORAL EVERY 6 HOURS PRN
Qty: 60 TABLET | Refills: 0 | Status: SHIPPED | OUTPATIENT
Start: 2024-02-28 | End: 2024-08-27

## 2024-02-28 RX ORDER — TRAZODONE HYDROCHLORIDE 50 MG/1
50 TABLET, FILM COATED ORAL
Status: DISCONTINUED | OUTPATIENT
Start: 2024-02-28 | End: 2024-03-01 | Stop reason: HOSPADM

## 2024-02-28 RX ORDER — TRAZODONE HYDROCHLORIDE 50 MG/1
50 TABLET, FILM COATED ORAL
Qty: 30 TABLET | Refills: 0 | Status: SHIPPED | OUTPATIENT
Start: 2024-02-28

## 2024-02-28 RX ADMIN — LORAZEPAM 2 MG: 1 TABLET ORAL at 08:18

## 2024-02-28 RX ADMIN — DOCUSATE SODIUM 100 MG: 100 CAPSULE, LIQUID FILLED ORAL at 20:17

## 2024-02-28 RX ADMIN — METFORMIN HYDROCHLORIDE 500 MG: 500 TABLET ORAL at 08:18

## 2024-02-28 RX ADMIN — LORAZEPAM 2 MG: 1 TABLET ORAL at 13:39

## 2024-02-28 RX ADMIN — DOCUSATE SODIUM 100 MG: 100 CAPSULE, LIQUID FILLED ORAL at 08:18

## 2024-02-28 RX ADMIN — LORAZEPAM 2 MG: 1 TABLET ORAL at 20:17

## 2024-02-28 RX ADMIN — DIVALPROEX SODIUM 1000 MG: 500 TABLET, EXTENDED RELEASE ORAL at 20:17

## 2024-02-28 RX ADMIN — METFORMIN HYDROCHLORIDE 500 MG: 500 TABLET ORAL at 17:26

## 2024-02-28 RX ADMIN — ARIPIPRAZOLE 10 MG: 10 TABLET ORAL at 08:18

## 2024-02-28 RX ADMIN — MICONAZOLE NITRATE: 2 POWDER TOPICAL at 11:30

## 2024-02-28 RX ADMIN — TRAZODONE HYDROCHLORIDE 50 MG: 50 TABLET ORAL at 20:17

## 2024-02-28 ASSESSMENT — ACTIVITIES OF DAILY LIVING (ADL)
HYGIENE/GROOMING: INDEPENDENT
ADLS_ACUITY_SCORE: 42
ADLS_ACUITY_SCORE: 43
ADLS_ACUITY_SCORE: 43
ADLS_ACUITY_SCORE: 42
ADLS_ACUITY_SCORE: 43
ADLS_ACUITY_SCORE: 42
ADLS_ACUITY_SCORE: 42
ADLS_ACUITY_SCORE: 43
ADLS_ACUITY_SCORE: 42
ADLS_ACUITY_SCORE: 43
ADLS_ACUITY_SCORE: 42
DRESS: SCRUBS (BEHAVIORAL HEALTH);INDEPENDENT
ORAL_HYGIENE: INDEPENDENT
ADLS_ACUITY_SCORE: 42
ADLS_ACUITY_SCORE: 43
ADLS_ACUITY_SCORE: 43
ADLS_ACUITY_SCORE: 42
LAUNDRY: UNABLE TO COMPLETE

## 2024-02-28 NOTE — DISCHARGE INSTRUCTIONS
"Behavioral Discharge Planning and Instructions    Summary: Pt presents after texting her psychiatrist, \"If I die will I go to heaven or hell?\"     Main Diagnosis: Schizoaffective disorder, depressed type with catatonia vs MDD with psychosis  IVY  Hx of borderline personality disorder    Health Care Follow-up:     [facility]3/1/24 at 1100  [address] [address]  Phone: [phone]    [facility]   [facility]- Virtual - 3/5 @ 2:30pm   [address]   [address]   [place] [address]   [phone]     [facility]   Jelena Mackey MD- Virtual - 3/13 @ 5:25pm   [address] [address]   [phone]    Attend all scheduled appointments with your outpatient providers. Call at least 24 hours in advance if you need to reschedule an appointment to ensure continued access to your outpatient providers.     Major Treatments, Procedures and Findings:  You were provided with: a psychiatric assessment, assessed for medical stability, medication evaluation and/or management, individual therapy, milieu management, and medical interventions    Symptoms to Report: feeling more aggressive, increased confusion, losing more sleep, mood getting worse, or thoughts of suicide    Early warning signs can include: increased depression or anxiety sleep disturbances increased thoughts or behaviors of suicide or self-harm  increased unusual thinking, such as paranoia or hearing voices    Safety and Wellness:  Take all medicines as directed.  Make no changes unless your doctor suggests them.      Follow treatment recommendations.  Refrain from alcohol and non-prescribed drugs.  Ask your support system to help you reduce your access to items that could harm yourself or others. Items could include:  Firearms  Medicines (both prescribed and over-the-counter)  Knives and other sharp objects  Ropes and like materials  Car keys  If there is a " "concern for safety, call 911. If there is a concern for safety, call 911.    Resources:   Crisis Intervention: 270.151.9020 or 826-858-3247 (TTY: 396.464.5591).  Call anytime for help.  National [place] on Mental Illness (www.[place].davian.org): 884.563.8839 or 183-897-1016.  [place] Association for Children's Mental Health (www.mac.org): 422.327.6079.  Alcoholics Anonymous (www.alcoholics-anonymous.org): Check your phone book for your local chapter.  Suicide Awareness Voices of Education (SAVE) (www.save.org): 598-465-BSII (4141)  National Suicide Prevention Line (www.mentalhealthmn.org): 598-712-OIXW (1243)  Mental Health Consumer/Survivor Network of [place] (www.mhcsn.net): 725.514.3864 or 812-064-1147  Mental Health Association of [place] (www.mentalhealth.org): 559.563.7746 or 461-506-6247  Self- Management and Recovery Training., [employer]-- Toll free: 396.775.4947  www.[employer]  Text 4 Life: txt \"LIFE\" to 49219 for immediate support and crisis intervention  Crisis text line: Text \"[place]\" to 006656. Free, confidential, 24/7.  Crisis Intervention: 935.554.6245 or 966-882-2248. Call anytime for help.     General Medication Instructions:   See your medication sheet(s) for instructions.   Take all medicines as directed.  Make no changes unless your doctor suggests them.   Go to all your doctor visits.  Be sure to have all your required lab tests. This way, your medicines can be refilled on time.  Do not use any drugs not prescribed by your doctor.  Avoid alcohol.    Advance Directives:   Scanned document on file with [place]? No scanned doc  Is document scanned? Pt states no documents  Honoring Choices Your Rights Handout: Informed and given  Was more information offered? Pt declined    The Treatment team has appreciated the opportunity to work with you. If you have any questions or concerns about your recent admission, you can contact the unit which can receive your call 24 hours a day, 7 days a week. They will be able to get " in touch with a Provider if needed. The unit number is [phone] .

## 2024-02-28 NOTE — PROGRESS NOTES
[facility]    Medical Services Progress Note    Date of Service (when I saw the patient): 02/28/2024    Assessment & Plan  Principal Problem:    Suicidal ideation     Active Medical Problems:  Recovered covid- covid positive on 2/2/24. Denies any complaints. Denies chest pain, sob, difficulty breathing. Denies feeling ill.      Chronic constipation- reports last bowel movement yesterday. Denies blood in stool. Denies abdominal pain. Takes colace and miralax daily. Nursing to continue to monitor and consult for new or worsening symptoms.   2/28- reports frequent bowel movements. Denies diarrhea, blood in stool, or abdominal pain. Taking scheduled miralax and colace. Miralax changed to as needed.      Morbid obesity- weight 299.8 lbs. BMI 49.84. Takes metformin. Encouraged diet and exercise.     Candidiasis- red, shiny, rash under bilateral breast folds. Miconazole powder bid.      Pt medically stable, no acute medical concerns. Chronic medical problems stable. Will sign off. Please consult for any new medical issues or concerns.      Code Status: Full Code.    Diana Mckeon CNP        -Data reviewed today: I reviewed all new labs and imaging results over the last 24 hours.     Physical Exam   Temp: 97.1  F (36.2  C) Temp src: Tympanic BP: 134/68 Pulse: 78   Resp: 16 SpO2: 98 % O2 Device: None (Room air)    There were no vitals filed for this visit.  Vital Signs with Ranges  Temp:  [97.1  F (36.2  C)-98.1  F (36.7  C)] 97.1  F (36.2  C)  Pulse:  [] 78  Resp:  [16-18] 16  BP: (105-134)/(55-72) 134/68  SpO2:  [97 %-98 %] 98 %  No intake/output data recorded.    Constitutional: awake, alert, cooperative, no apparent distress, and appears stated age, vitals stable   Eyes: Lids and lashes normal, pupils equal, round and reactive to light, extra ocular muscles intact, sclera clear, conjunctiva normal  ENT: Normocephalic, without obvious abnormality, atraumatic, external ears without lesions, oral pharynx  with moist mucous membranes, no erythema or exudates  Hematologic / Lymphatic: no cervical lymphadenopathy  Respiratory: No increased work of breathing, good air exchange, clear to auscultation bilaterally, no crackles or wheezing  Cardiovascular: Normal apical impulse, regular rate and rhythm, normal S1 and S2, no S3 or S4, and no murmur noted  GI: obese, normal bowel sounds, soft, non-distended, non-tender, no masses palpated, no hepatosplenomegally  Skin: red, shiny rash under bilateral breast folds, otherwise normal skin color, texture, turgor and no redness, warmth, or swelling  Musculoskeletal: There is no redness, warmth, or swelling of the joints.  Full range of motion noted.    Neurologic: Awake, alert, oriented to name, place and time.  Cranial nerves II-XII are grossly intact.   Neuropsychiatric: General: flat,  calm, and normal eye contact    Medications    ARIPiprazole  10 mg Oral Daily    divalproex sodium extended-release  1,000 mg Oral At Bedtime    docusate sodium  100 mg Oral BID    LORazepam  2 mg Oral TID    metFORMIN  500 mg Oral BID w/meals    miconazole   Topical BID       Data   Recent Labs   Lab 02/26/24 2027   WBC 9.4   HGB 13.8   MCV 90         POTASSIUM 4.1   CHLORIDE 100   CO2 26   BUN 11.4   CR 0.83   ANIONGAP 13   CRISTINE 9.7   *   ALBUMIN 4.1   PROTTOTAL 7.5   BILITOTAL 0.3   ALKPHOS 82   ALT 30   AST 28       No results found for this or any previous visit (from the past 24 hour(s)).

## 2024-02-28 NOTE — PROGRESS NOTES
Pt accepted to [facility] for this week. Writer passed along paperwork for provider to complete.     Provider completed paperwork and writer scheduled appointment for PCP. Wait to hear back for med management. CM was contacted and will be updated on time as soon as it is determined.  Emailed everything needed back to [facility].

## 2024-02-28 NOTE — PROGRESS NOTES
"[facility] PSYCHIATRY  PROGRESS NOTE     SUBJECTIVE     Misty is resting in bed when I see her today. She notes that she feels \"pretty good\". She states that she spoke with her mother yesterday about IRTS placement, she feels that this will be a good idea as a step down from the hospital. She was informed that she was accepted to [facility] for likely Friday. Patient states she has been going to some of the groups during the day. Her appetite has been good, no concerns for restricting intake. She notes that she still struggles some to fall and stay asleep. She asks if her PTA dose of Trazodone can be added back, will reorder this for tonight and see if sleep improves. Patient denies any suicidal thoughts today and reports improvement in mood. She is looking forward to discharge soon.        MEDICATIONS   Scheduled Meds:   ARIPiprazole  10 mg Oral Daily    divalproex sodium extended-release  1,000 mg Oral At Bedtime    docusate sodium  100 mg Oral BID    LORazepam  2 mg Oral TID    metFORMIN  500 mg Oral BID w/meals    miconazole   Topical BID     PRN Meds:.acetaminophen, alum & mag hydroxide-simethicone, hydrOXYzine HCl, melatonin, nicotine, OLANZapine **OR** OLANZapine, ondansetron, polyethylene glycol, traZODone     ALLERGIES   No Known Allergies     MENTAL STATUS EXAM   Vitals: /68   Pulse 78   Temp 97.1  F (36.2  C) (Tympanic)   Resp 16   SpO2 98%     Appearance:  awake, alert, dressed in hospital scrubs, appeared as age stated, and slightly unkempt  Attitude:  guarded though pleasant  Eye Contact: fair  Mood: \"pretty good\", feels that depression has improved since admit  Affect: less blunted today, smiling more  Speech:  clear, coherent  Psychomotor Behavior:  no evidence of tardive dyskinesia, dystonia, or tics  Thought Process: linear, more goal oriented  Associations:  no loose associations   Thought Content: continues to have chronic, passive SI. Denies hallucinations  Insight: " limited  Judgment: limited  Oriented to:  time, person, and place  Attention Span and Concentration:  limited  Recent and Remote Memory: fair  Fund of Knowledge: likely below average  Muscle Strength and Tone: normal  Gait and Station: Normal       LABS   No results found for this or any previous visit (from the past 24 hour(s)).        IMPRESSION     This is a 38 year old female with a PMH of MDD, psychosis, IVY, borderline personality disorder, suicide attempts who presents to the ED for evaluation of SI. She reportedly had sent a message to her psychiatrist that alluded to patient having SI. Patient has been hospitalized many times in the past. She has a history of commitment with [facility] and shayy merino. A petition for commitment was filed with the county and supported. Today patient continues to report SI, though does state that these feelings are chronic. She denies that there was any particular stressor that prompted her hospitalization. She is in agreement to continue the medications that had been started when she was at [facility]. Effexor XR had been increased to 300 mg for 5 or 6 days though patient reported no significant improvement in depression or anxiety so dose was decreased back to 225 mg daily yesterday. Abilify was added yesterday. It does appear from previous notes that she has been in this in the past. Xanax was switched to Ativan to treat symptoms of catatonia with documented improvement in catatonic symptoms in past few days. Patient is in agreement to continue these medications with changes, she denies any side effects. Did also review that once stable plan will be for IRTS placement, can likely start sending referrals next week.        DIAGNOSES     Major depressive disorder, recurrent, severe, with psychotic features  2.   Generalized anxiety disorder  3.   Borderline personality disorder       PLAN     Location: Unit 5  Legal Status: Orders Placed This Encounter      Legal status Patient  is Committed    Safety Assessment:    Behavioral Orders   Procedures    Code 1 - Restrict to Unit    Routine Programming     As clinically indicated    Status 15     Every 15 minutes.      PTA psychotropic medications stopped:     -Xanax-> had been switched to Ativan prior to admit   -Effexor  mg daily-> 150 mg on 2/20 and discontinued on 2/22  -Zyprexa 10 mg at bedtime- stopped    PTA psychotropic medications continued/changed:     -Abilify 2 mg daily-> 5 mg daily on 2/17-> 10 mg on 2/20  -Ativan 2 mg TID for catatonia  -Trazodone 50 mg at bedtime PRN sleep    New medications tried and stopped:     -none    New medications initiated:     -standard unit PRN medications   -Depakote  mg at bedtime on 2/21-> 1,000 mg on 2/22    Today's Changes:    -add back Trazodone 50 mg at bedtime PRN sleep  -seen by medical NP- change Miralax to PRN, miconazole powder for rash under breasts  -accepted to [facility] this week    Programming: Patient will be treated in a therapeutic milieu with appropriate individual and group therapies. Education will be provided on diagnoses, medications, and treatments.     Medical diagnoses:  Per medicine    Consult: None  Tests: VPA level on 2/26- 92.4    Anticipated LOS: > 7 days  Disposition: likely IRTS placement- accepted to [facility]       ATTESTATION    Cora DORSEY, CNP

## 2024-02-28 NOTE — PLAN OF CARE
Face to face report received from Osito RN. Pt. Observed.    Problem: Adult Behavioral Health Plan of Care  Goal: Patient-Specific Goal (Individualization)  Description: Pt will attend at least 50% of groups.  Pt will eat at least 50% of meals.  Pt will sleep at least 6-8 hours per night.   Pt will be compliant will ordered medications and treatment team recommendations.    Outcome: Progressing  Pt has been in bed with eyes closed and regular respirations x 6 hours this noc shift. 15 minute and PRN checks all night. No complaints offered.      Face to face end of shift report communicated to oncoming RN.     Maggie Merrill RN  2/28/2024

## 2024-02-28 NOTE — PLAN OF CARE
Problem: Adult Behavioral Health Plan of Care  Goal: Patient-Specific Goal (Individualization)  Description: Pt will attend at least 50% of groups.  Pt will eat at least 50% of meals.  Pt will sleep at least 6-8 hours per night.   Pt will be compliant will ordered medications and treatment team recommendations.    Outcome: Progressing     Problem: Thought Process Alteration  Goal: Optimal Thought Clarity  Description: Pt will communicate in reality based conversations.  Pt will verbalize decrease hallucinations by discharge.   Outcome: Progressing     Problem: Suicide Risk  Goal: Absence of Self-Harm  Description: Pt will remain free from self harm while hospitalized.  Pt will verbalize a safety plan and deny suicidal ideation prior to discharge.   Outcome: Progressing   Goal Outcome Evaluation:    Pt in her room most of the shift. Nursing has been encouraging pt to get out of bed and attend group but pt has been refusing stating she is too tired. Pt denied pain, HI and hallucinations. Pt does report some anxiety about going to a new facility but states depression and anxiety have gotten better. Pt reports a small amount of suicidal ideation but states she is able to cope with the fleeting thoughts.     Pt's family friend brought a bag of coloring books, colored pencils and sharpeners for her to bring to the IR facility,     Plan of Care Reviewed With: patient          Face to face end of shift report communicated to evening shift RN. Reported that pt is a risk or suicide.     Goldie Zimmerman, RN  2/28/2024  1:03 PM

## 2024-02-29 PROCEDURE — 250N000013 HC RX MED GY IP 250 OP 250 PS 637: Performed by: NURSE PRACTITIONER

## 2024-02-29 PROCEDURE — 124N000004

## 2024-02-29 PROCEDURE — 99231 SBSQ HOSP IP/OBS SF/LOW 25: CPT | Performed by: NURSE PRACTITIONER

## 2024-02-29 RX ADMIN — MICONAZOLE NITRATE: 2 POWDER TOPICAL at 08:10

## 2024-02-29 RX ADMIN — METFORMIN HYDROCHLORIDE 500 MG: 500 TABLET ORAL at 17:14

## 2024-02-29 RX ADMIN — LORAZEPAM 2 MG: 1 TABLET ORAL at 20:24

## 2024-02-29 RX ADMIN — DIVALPROEX SODIUM 1000 MG: 500 TABLET, EXTENDED RELEASE ORAL at 20:24

## 2024-02-29 RX ADMIN — DOCUSATE SODIUM 100 MG: 100 CAPSULE, LIQUID FILLED ORAL at 08:08

## 2024-02-29 RX ADMIN — TRAZODONE HYDROCHLORIDE 50 MG: 50 TABLET ORAL at 20:26

## 2024-02-29 RX ADMIN — METFORMIN HYDROCHLORIDE 500 MG: 500 TABLET ORAL at 08:08

## 2024-02-29 RX ADMIN — DOCUSATE SODIUM 100 MG: 100 CAPSULE, LIQUID FILLED ORAL at 20:24

## 2024-02-29 RX ADMIN — LORAZEPAM 2 MG: 1 TABLET ORAL at 08:08

## 2024-02-29 RX ADMIN — LORAZEPAM 2 MG: 1 TABLET ORAL at 13:12

## 2024-02-29 RX ADMIN — ARIPIPRAZOLE 10 MG: 10 TABLET ORAL at 08:08

## 2024-02-29 ASSESSMENT — ACTIVITIES OF DAILY LIVING (ADL)
ADLS_ACUITY_SCORE: 42
ADLS_ACUITY_SCORE: 42
ORAL_HYGIENE: INDEPENDENT
HYGIENE/GROOMING: INDEPENDENT
ADLS_ACUITY_SCORE: 42
DRESS: SCRUBS (BEHAVIORAL HEALTH)
ADLS_ACUITY_SCORE: 42
LAUNDRY: UNABLE TO COMPLETE
ADLS_ACUITY_SCORE: 42
ORAL_HYGIENE: INDEPENDENT
ADLS_ACUITY_SCORE: 42
DRESS: SCRUBS (BEHAVIORAL HEALTH)
HYGIENE/GROOMING: INDEPENDENT
ADLS_ACUITY_SCORE: 42

## 2024-02-29 NOTE — PROGRESS NOTES
"[facility] PSYCHIATRY  PROGRESS NOTE     SUBJECTIVE     Misty is up coloring in her room when I see her today. She asks if there is any updates regarding discharge. Did review that she was accepted to [facility] for tomorrow. She notes that she is very happy about this, as she did not want to spend another weekend stuck inside the hospital. She denies any suicidal thoughts today. She reports sleeping \"okay\", she notes that she is trying to stay up and active throughout the day and limit the time in her room. She does occasionally attend groups and spends time up in the lounge. Her appetite has been good.       MEDICATIONS   Scheduled Meds:   ARIPiprazole  10 mg Oral Daily    divalproex sodium extended-release  1,000 mg Oral At Bedtime    docusate sodium  100 mg Oral BID    LORazepam  2 mg Oral TID    metFORMIN  500 mg Oral BID w/meals    miconazole   Topical BID     PRN Meds:.acetaminophen, alum & mag hydroxide-simethicone, hydrOXYzine HCl, melatonin, nicotine, OLANZapine **OR** OLANZapine, ondansetron, polyethylene glycol, traZODone     ALLERGIES   No Known Allergies     MENTAL STATUS EXAM   Vitals: /69   Pulse 92   Temp 97.3  F (36.3  C) (Temporal)   Resp 18   SpO2 98%     Appearance:  awake, alert, dressed in hospital scrubs, appeared as age stated, and slightly unkempt  Attitude:  guarded though pleasant  Eye Contact: fair  Mood: \"pretty good\", feels that depression has improved since admit  Affect: less blunted today, smiling more  Speech:  clear, coherent  Psychomotor Behavior:  no evidence of tardive dyskinesia, dystonia, or tics  Thought Process: linear, more goal oriented  Associations:  no loose associations   Thought Content: continues to have chronic, passive SI. Denies hallucinations  Insight: fair, has improved since admission  Judgment: limited  Oriented to:  time, person, and place  Attention Span and Concentration:  limited  Recent and Remote Memory: fair  Fund of Knowledge: likely below " average  Muscle Strength and Tone: normal  Gait and Station: Normal       LABS   No results found for this or any previous visit (from the past 24 hour(s)).        IMPRESSION     This is a 38 year old female with a PMH of MDD, psychosis, IVY, borderline personality disorder, suicide attempts who presents to the ED for evaluation of SI. She reportedly had sent a message to her psychiatrist that alluded to patient having SI. Patient has been hospitalized many times in the past. She has a history of commitment with [facility] and shayy merino. A petition for commitment was filed with the county and supported. Today patient continues to report SI, though does state that these feelings are chronic. She denies that there was any particular stressor that prompted her hospitalization. She is in agreement to continue the medications that had been started when she was at [facility]. Effexor XR had been increased to 300 mg for 5 or 6 days though patient reported no significant improvement in depression or anxiety so dose was decreased back to 225 mg daily yesterday. Abilify was added yesterday. It does appear from previous notes that she has been in this in the past. Xanax was switched to Ativan to treat symptoms of catatonia with documented improvement in catatonic symptoms in past few days. Patient is in agreement to continue these medications with changes, she denies any side effects. Did also review that once stable plan will be for IRTS placement, can likely start sending referrals next week.        DIAGNOSES     Major depressive disorder, recurrent, severe, with psychotic features  2.   Generalized anxiety disorder  3.   Borderline personality disorder       PLAN     Location: Unit 5  Legal Status: Orders Placed This Encounter      Legal status Patient is Committed    Safety Assessment:    Behavioral Orders   Procedures    Code 1 - Restrict to Unit    Routine Programming     As clinically indicated    Status 15     Every 15  minutes.      PTA psychotropic medications stopped:     -Xanax-> had been switched to Ativan prior to admit   -Effexor  mg daily-> 150 mg on 2/20 and discontinued on 2/22  -Zyprexa 10 mg at bedtime- stopped    PTA psychotropic medications continued/changed:     -Abilify 2 mg daily-> 5 mg daily on 2/17-> 10 mg on 2/20  -Ativan 2 mg TID for catatonia  -Trazodone 50 mg at bedtime PRN sleep    New medications tried and stopped:     -none    New medications initiated:     -standard unit PRN medications   -Depakote  mg at bedtime on 2/21-> 1,000 mg on 2/22    Today's Changes:    -no changes to medications  -accepted to [facility] tomorrow 3/1    Programming: Patient will be treated in a therapeutic milieu with appropriate individual and group therapies. Education will be provided on diagnoses, medications, and treatments.     Medical diagnoses:  Per medicine    Consult: None  Tests: VPA level on 2/26- 92.4    Anticipated LOS: > 7 days  Disposition: Accepted to [facility] tomorrow 3/1       ATTESTATION    Cora DORSEY, CNP

## 2024-02-29 NOTE — PLAN OF CARE
"Face to face end of shift report received from day RN. Rounding completed. Patient observed resting in her room in bed.     Robert Schultz RN  2/28/2024  1600    Patient spent majority of time watching television and resting in her room. Denies SI/HI/AVH/depression/anxiety. \"I'm doing okay today.\" Appetite/hygiene good. Medication compliant.    Report given to night RN.    Problem: Adult Behavioral Health Plan of Care  Goal: Patient-Specific Goal (Individualization)  Description: Pt will attend at least 50% of groups.  Pt will eat at least 50% of meals.  Pt will sleep at least 6-8 hours per night.   Pt will be compliant will ordered medications and treatment team recommendations.    Outcome: Progressing     Problem: Thought Process Alteration  Goal: Optimal Thought Clarity  Description: Pt will communicate in reality based conversations.  Pt will verbalize decrease hallucinations by discharge.   Outcome: Progressing     Problem: Suicide Risk  Goal: Absence of Self-Harm  Description: Pt will remain free from self harm while hospitalized.  Pt will verbalize a safety plan and deny suicidal ideation prior to discharge.   Outcome: Progressing     "

## 2024-02-29 NOTE — PLAN OF CARE
Face to face end of shift report will be communicated to oncoming RN.    Problem: Adult Behavioral Health Plan of Care  Goal: Patient-Specific Goal (Individualization)  Description: Pt will attend at least 50% of groups.  Pt will eat at least 50% of meals.  Pt will sleep at least 6-8 hours per night.   Pt will be compliant will ordered medications and treatment team recommendations.    Outcome: Progressing     Problem: Thought Process Alteration  Goal: Optimal Thought Clarity  Description: Pt will communicate in reality based conversations.  Pt will verbalize decrease hallucinations by discharge.   Outcome: Progressing     Problem: Suicide Risk  Goal: Absence of Self-Harm  Description: Pt will remain free from self harm while hospitalized.  Pt will verbalize a safety plan and deny suicidal ideation prior to discharge.   Outcome: Progressing  Face to face end of shift report obtained from LANA Jones. Pt observed resting in bed. Pt appears to be sleeping in bed with eyes closed. 15 minutes and PRN safety checks completed with no noted complains. No self harm or delusional comments noted or reported so far this shift.   0600-Pt appeared to had slept 4 hours. At times unable to determine. Pt stayed in bed all night.

## 2024-02-29 NOTE — PLAN OF CARE
Problem: Adult Behavioral Health Plan of Care  Goal: Patient-Specific Goal (Individualization)  Description: Pt will attend at least 50% of groups.  Pt will eat at least 50% of meals.  Pt will sleep at least 6-8 hours per night.   Pt will be compliant will ordered medications and treatment team recommendations.    Outcome: Progressing     Problem: Thought Process Alteration  Goal: Optimal Thought Clarity  Description: Pt will communicate in reality based conversations.  Pt will verbalize decrease hallucinations by discharge.   Outcome: Progressing     Problem: Suicide Risk  Goal: Absence of Self-Harm  Description: Pt will remain free from self harm while hospitalized.  Pt will verbalize a safety plan and deny suicidal ideation prior to discharge.   Outcome: Progressing   Goal Outcome Evaluation:     Plan of Care Reviewed With: patient       Pt. Has been up and about on unit, ate breakfast in dayroom, appetite is good, taking prescribed medications, slept 4 hours last night, cooperative with treatment team recommendations, is able to make needs be known, attending group therapy sessions, denies suicidal ideation, endorses mild depression and anxiety, has remained free from harm/injury, will continue to monitor progress.    Face to face end of shift report will be communicated to oncoming afternoon shift RN.     Sandy Galeano RN  2/29/2024  9:40 AM

## 2024-02-29 NOTE — PROGRESS NOTES
Connie Garcia received all neccessary paperwork for intake tomorrow 3/1. They would like pt to be there by 11am. Ride made for 7:30a with [employer].

## 2024-03-01 VITALS
SYSTOLIC BLOOD PRESSURE: 117 MMHG | TEMPERATURE: 96.8 F | DIASTOLIC BLOOD PRESSURE: 68 MMHG | RESPIRATION RATE: 18 BRPM | OXYGEN SATURATION: 93 % | HEART RATE: 108 BPM

## 2024-03-01 PROCEDURE — 250N000013 HC RX MED GY IP 250 OP 250 PS 637: Performed by: NURSE PRACTITIONER

## 2024-03-01 PROCEDURE — 99239 HOSP IP/OBS DSCHRG MGMT >30: CPT | Performed by: NURSE PRACTITIONER

## 2024-03-01 RX ADMIN — ARIPIPRAZOLE 10 MG: 10 TABLET ORAL at 07:03

## 2024-03-01 RX ADMIN — MICONAZOLE NITRATE: 2 POWDER TOPICAL at 07:04

## 2024-03-01 RX ADMIN — LORAZEPAM 2 MG: 1 TABLET ORAL at 07:03

## 2024-03-01 RX ADMIN — DOCUSATE SODIUM 100 MG: 100 CAPSULE, LIQUID FILLED ORAL at 07:03

## 2024-03-01 RX ADMIN — METFORMIN HYDROCHLORIDE 500 MG: 500 TABLET ORAL at 07:03

## 2024-03-01 ASSESSMENT — ACTIVITIES OF DAILY LIVING (ADL)
ADLS_ACUITY_SCORE: 42

## 2024-03-01 NOTE — PLAN OF CARE
Goal Outcome Evaluation:     Plan of Care Reviewed With: patient         Discharge Note    Patient Discharged to [facility] facility on 3/1/2024 7:35AM [facility].     Patient informed of discharge instructions in AVS. patient verbalizes understanding and denies having any questions pertaining to AVS. Patient stable at time of discharge. Patient denies SI, HI, and thoughts of self harm at time of discharge. All personal belongings returned to patient. Discharge prescriptions sent to [facility] via electronic communication. Psych evaluation, history and physical, AVS, and discharge summary faxed to next level of care- per . Medications sent with patient.    Sandy Galeano RN  3/1/2024  7:52 AM

## 2024-03-01 NOTE — DISCHARGE SUMMARY
[facility] PSYCHIATRY  DISCHARGE SUMMARY     DISCHARGE DATA     Misty Parham MRN# [id number]   Age: 38 year old YOB: 1985     Date of Admission: 2/16/2024  Date of Discharge: March 1, 2024  Discharge Provider: Cora Griffith NP       REASON FOR ADMISSION     This is a 38 year old female with a PMH of MDD, psychosis, IVY, borderline personality disorder, suicide attempts who presents to the ED for evaluation of SI. She reportedly had sent a message to her psychiatrist that alluded to patient having SI. Patient has been hospitalized many times in the past. She has a history of commitment with [facility] and shayy merino. A petition for commitment was filed with the county and supported. Today patient continues to report SI, though does state that these feelings are chronic. She denies that there was any particular stressor that prompted her hospitalization. She is in agreement to continue the medications that had been started when she was at [facility]. Effexor XR had been increased to 300 mg for 5 or 6 days though patient reported no significant improvement in depression or anxiety so dose was decreased back to 225 mg daily yesterday. Abilify was added yesterday. It does appear from previous notes that she has been in this in the past. Xanax was switched to Ativan to treat symptoms of catatonia with documented improvement in catatonic symptoms in past few days. Patient is in agreement to continue these medications with changes, she denies any side effects. Did also review that once stable plan will be for IRTS placement, can likely start sending referrals next week.        DISCHARGE DIAGNOSES     Major depressive disorder, recurrent, severe, with psychotic and catatonic features  2.   Generalized anxiety disorder  3.   Borderline personality disorder       CONSULTS     OT- MOCA score of 24/30 on 2/22/24. ACLS 5.4/5.8       HOSPITAL COURSE     Legal status: Committed    Patient was admitted to unit 5  due to the aforementioned presentation. The patient was placed under 15 minute checks to ensure patient safety. The patient participated in unit programming and groups as able.    Ms. Parham did not require seclusion/restraint during hospitalization.     We reviewed with Ms. Parham current and past medication trials including duration, dose, response and side effects. During this hospitalization, the following changes to the patient's psychotropic medications were made:    PTA psychotropic medications stopped:     -Xanax-> switched to Ativan prior to admission  -Effexor  mg daily-> tapered down and stopped  -Zyprexa 10 mg at bedtime-> stopped in favor of Abilify    PTA psychotropic medications continued/changed:     -Abilify 2 mg daily-> 5mg daily on 2/17-> 10 mg on 2/20  -Ativan 2 mg TID for catatonia  -Trazodone 50 mg at bedtime prn sleep    New psychotropic medications tried and stopped:     -none    New psychotropic medications initiated:     -Depakote  mg at bedtime-> 1,000 mg on 2/22    Patient was tapered off of Effexor XR and Zyprexa and started on Depakote ER and Abilify for mood stabilization. Patient tolerated these changes well. She felt that Ativan was more helpful than Xanax. Initially upon admission patient had a brief period of not taking medications, which led to increase in depression and poor communication. She did briefly exhibit more signs of catatonia. She did quickly resume medications and continued to take them as scheduled for the remainder of her stay. She denied any side effects from medications and felt they were helping. She was visible in the unit milieu and attended more groups over the course of her stay. She was denying any suicidal thoughts by day of discharge. IRTS referrals were sent out and patient was accepted to [facility] today. She will discharge with 30 days of medication and will follow-up with outpatient providers.     With these changes and  supports the patient noticed improvement in their symptoms and felt sufficiently ready for discharge. As a result, Misty Parham was discharged to IRTS program. At the time of discharge, Misty Parham was determined to not be a danger to self or others. The patient was also medically stable for discharge. At the current time of discharge, the patient does not meet criteria for involuntary hospitalization. On the day of discharge, the patient reports that they do not have suicidal or homicidal ideation. Steps taken to minimize risk include: assessing patient s behavior and thought process daily during hospital stay, discharging patient with adequate plan for follow up for mental and physical health and discussing safety plan of returning to the hospital should the patient ever have thoughts of harming themselves or others. Therefore, based on all available evidence including the factors cited above, the patient does not appear to be at imminent risk for self-harm, and is appropriate for outpatient level of care. However, if patient uses substances or is medication non-adherent, their risk of decompensation and SI will be elevated. This was discussed with the patient.       DISCHARGE MEDICATIONS     Discharge Medication List as of 3/1/2024  7:11 AM        START taking these medications    Details   ARIPiprazole (ABILIFY) 10 MG tablet Take 1 tablet (10 mg) by mouth daily, Disp-30 tablet, R-0, E-Prescribe      divalproex sodium extended-release (DEPAKOTE ER) 500 MG 24 hr tablet Take 2 tablets (1,000 mg) by mouth at bedtime, Disp-60 tablet, R-0, E-Prescribe      hydrOXYzine HCl (ATARAX) 25 MG tablet Take 1 tablet (25 mg) by mouth every 6 hours as needed for anxiety, Disp-60 tablet, R-0, E-Prescribe      LORazepam (ATIVAN) 2 MG tablet Take 1 tablet (2 mg) by mouth 3 times daily, Disp-90 tablet, R-0, E-Prescribe      metFORMIN (GLUCOPHAGE) 500 MG tablet Take 1 tablet (500 mg) by mouth 2 times daily (with meals), Disp-60  "tablet, R-0, E-Prescribe      miconazole (MICATIN) 2 % external powder Apply topically 2 times daily to rash under breastsDisp-85 g, [id number]           CONTINUE these medications which have CHANGED    Details   docusate sodium (COLACE) 100 MG capsule Take 1 capsule (100 mg) by mouth 2 times daily, Disp-60 capsule, R-0, E-Prescribe      traZODone (DESYREL) 50 MG tablet Take 1 tablet (50 mg) by mouth nightly as needed for sleep, Disp-30 tablet, R-0, E-Prescribe           CONTINUE these medications which have NOT CHANGED    Details   melatonin 3 MG tablet Take 3 mg by mouth at bedtime, Historical      polyethylene glycol (MIRALAX) 17 GM/Dose powder Take 1 Capful by mouth daily, Historical           STOP taking these medications       ALPRAZolam (XANAX) 1 MG tablet Comments:   Reason for Stopping: switched to Ativan        OLANZapine (ZYPREXA) 20 MG tablet Comments:   Reason for Stopping: switched to Abilify        venlafaxine (EFFEXOR XR) 150 MG 24 hr capsule Comments:   Reason for Stopping: tapered off and stopped        Vitamin D3 (CHOLECALCIFEROL) 25 mcg (1000 units) tablet Comments:   Reason for Stopping: not taking        zolpidem (AMBIEN) 10 MG tablet Comments:   Reason for Stopping: not taking            Reason for two or more neuroleptics: not applicable        MENTAL STATUS EXAM   Vitals: /68   Pulse 108   Temp 96.8  F (36  C) (Temporal)   Resp 18   SpO2 93%     Appearance: Alert, oriented, dressed in hospital scrubs, appears stated age   Attitude: Cooperative, mildly guarded  Eye Contact: Good  Mood: \"pretty good\"  Affect: blunted though improved from admit  Speech: Normal rate and rhythm   Psychomotor Behavior: No tremor, rigidity, or psychomotor abnormality   Thought Process: Logical, goal directed   Associations: No loose associations   Thought Content: Denies SI or plan. No SIB. Denies A/V hallucinations. No evidence of delusional thought.  Insight: fair  Judgment: fair  Oriented to: " Person, place, and time  Attention Span and Concentration: Intact  Recent and Remote Memory: Intact  Language: English with appropriate syntax and vocabulary  Fund of Knowledge: Average  Muscle Strength and Tone: Grossly normal  Gait and Station: Grossly normal       DISCHARGE PLAN     1.  Education given regarding diagnostic and treatment options with risks, benefits and alternatives with adequate verbalization of understanding.  2.  [facility]charge [facility]. Upon detailed review of risk factors, patient amenable for release.   3.  Continue aforementioned medications and associated medication changes with follow-up by outpatient provider.  4.  Crisis management planning in place.    5.  Nursing and  to review further discharge recommendations.   6.  Patient is being discharged with the following appointments as detailed below.      Health Care Follow-up:      [facility]-accepted 3/1/24 at 1100  [address] [place] [address]  Phone: [phone]     [facility]   Betty Cazares- Virtual - 3/5 @ 2:30pm   [address] [facility]   [address]   [place] [address]   [phone]      [place] [facility]   Jelena Mackey MD- Virtual - 3/13 @ 5:25pm   [address] [facility] [address]   [phone]       DISCHARGE SERVICES PROVIDED     40 minutes spent on discharge services, including:  Final examination of patient.  Review and discussion of hospital stay.  Instructions for continued outpatient care/goals.  Preparation of discharge records.  Preparation of medications refills and new prescriptions.  Preparation of applicable referral forms.        ATTESTATION     Cora Griffith NP       LABS THIS ADMISSION     Results for orders placed or performed during the hospital encounter of 02/16/24   Valproic acid     Status: Normal   Result Value Ref Range    Valproic acid 92.4   ug/mL   CBC with platelets     Status: Normal   Result  Value Ref Range    WBC Count 9.4 4.0 - 11.0 10e3/uL    RBC Count 4.73 3.80 - 5.20 10e6/uL    Hemoglobin 13.8 11.7 - 15.7 g/dL    Hematocrit 42.6 35.0 - 47.0 %    MCV 90 78 - 100 fL    MCH 29.2 26.5 - 33.0 pg    MCHC 32.4 31.5 - 36.5 g/dL    RDW 14.8 10.0 - 15.0 %    Platelet Count 353 150 - 450 10e3/uL   Comprehensive metabolic panel     Status: Abnormal   Result Value Ref Range    Sodium 139 135 - 145 mmol/L    Potassium 4.1 3.4 - 5.3 mmol/L    Carbon Dioxide (CO2) 26 22 - 29 mmol/L    Anion Gap 13 7 - 15 mmol/L    Urea Nitrogen 11.4 6.0 - 20.0 mg/dL    Creatinine 0.83 0.51 - 0.95 mg/dL    GFR Estimate >90 >60 mL/min/1.73m2    Calcium 9.7 8.6 - 10.0 mg/dL    Chloride 100 98 - 107 mmol/L    Glucose 100 (H) 70 - 99 mg/dL    Alkaline Phosphatase 82 40 - 150 U/L    AST 28 0 - 45 U/L    ALT 30 0 - 50 U/L    Protein Total 7.5 6.4 - 8.3 g/dL    Albumin 4.1 3.5 - 5.2 g/dL    Bilirubin Total 0.3 <=1.2 mg/dL

## 2024-03-01 NOTE — PLAN OF CARE
Problem: Adult Behavioral Health Plan of Care  Goal: Patient-Specific Goal (Individualization)  Description: Pt will attend at least 50% of groups.  Pt will eat at least 50% of meals.  Pt will sleep at least 6-8 hours per night.   Pt will be compliant will ordered medications and treatment team recommendations.    Outcome: Progressing     Problem: Thought Process Alteration  Goal: Optimal Thought Clarity  Description: Pt will communicate in reality based conversations.  Pt will verbalize decrease hallucinations by discharge.   Outcome: Progressing     Problem: Suicide Risk  Goal: Absence of Self-Harm  Description: Pt will remain free from self harm while hospitalized.  Pt will verbalize a safety plan and deny suicidal ideation prior to discharge.   Outcome: Progressing     Pt denies SI/HI, AH/VH, pain, anxiety, depression. Pt is medication compliant and VSS. Pt ate 100% of dinner. Pt has a flat affect. Pt is calm and alert. Pt is using appropriate behavior with staff and peers.  Pt spent most of shift out in lounge with peers. Pt went to some groups. 2026 PRN trazodone 50mg for sleep.     Face to face report will be communicated to oncoming RN.    Christine Grimm RN  2/29/2024

## 2024-03-01 NOTE — PROGRESS NOTES
Pt is discharging at the recommendation of the treatment team. Pt is discharging to [facility] transported by [employer]. Pt denies having any thoughts of hurting themself or anyone else. Pt denies anxiety or depression. Pt has follow up with PCP and med management. Discharge instructions, including; demographic sheet, psychiatric evaluation, discharge summary, and AVS were faxed to these next level of care providers.

## 2024-03-01 NOTE — PLAN OF CARE
Face to face end of shift report will be communicated to oncoming RN.    Problem: Adult Behavioral Health Plan of Care  Goal: Patient-Specific Goal (Individualization)  Description: Pt will attend at least 50% of groups.  Pt will eat at least 50% of meals.  Pt will sleep at least 6-8 hours per night.   Pt will be compliant will ordered medications and treatment team recommendations.    Outcome: Progressing     Problem: Thought Process Alteration  Goal: Optimal Thought Clarity  Description: Pt will communicate in reality based conversations.  Pt will verbalize decrease hallucinations by discharge.   Outcome: Progressing     Problem: Suicide Risk  Goal: Absence of Self-Harm  Description: Pt will remain free from self harm while hospitalized.  Pt will verbalize a safety plan and deny suicidal ideation prior to discharge.   Outcome: Progressing   Face to face end of shift report obtained from LANA King. Pt observed resting in bed. 15 minutes and PRN safety checks completed with no noted complains. No self harm or delusional comments noted or reported so far this shift.    0600-Pt awake most of the night. Appeared to had slept 1 hour. Pt slightly anxious related to discharge.

## 2024-03-04 ENCOUNTER — TELEPHONE (OUTPATIENT)
Dept: BEHAVIORAL HEALTH | Facility: HOSPITAL | Age: 39
End: 2024-03-04

## 2024-03-04 NOTE — TELEPHONE ENCOUNTER
[facility]: Post-Hospital Discharge Note     Situation   Post hospital discharge call placed 03/04/24.      Misty did not answer. A message was left.  Messages are left with a call back number should they need to reach staff with any questions, need for additional resources, or need assistance setting up a post hospitalization follow up appointments, if unable to do so independently.    Inpatient Mental Health Admission Information:  Admission Date: 2/16/24  Admission Reason: This is a 38 year old female with a PMH of MDD, psychosis, IVY, borderline personality disorder, suicide attempts who presents to the ED for evaluation of SI. She reportedly had sent a message to her psychiatrist that alluded to patient having SI. Patient has been hospitalized many times in the past. She has a history of commitment with [facility] and [facility]y mer[facility]. A petition for commitment was filed with the county and supported. Today patient continues to report SI, though does state that these feelings are chronic. She denies that there was any particular stressor that prompted her hospitalization. She is in agreement to continue the medications that had been started when she was at [facility]. Effexor XR had been increased to 300 mg for 5 or 6 days though patient reported no significant improvement in depression or anxiety so dose was decreased back to 225 mg daily yesterday. Abilify was added yesterday. It does appear from previous notes that she has been in this in the past. Xanax was switched to Ativan to treat symptoms of catatonia with documented improvement in catatonic symptoms in past few days. Patient is in agreement to continue these medications with changes, she denies any side effects. Did also review that once stable plan will be for IRTS placement, can likely start sending referrals next week.      Inpatient Mental Health Discharge Information:  Discharge Date: 3/1/24  Discharged to: IRTS  Discharge Diagnosis: Major depressive  "disorder, recurrent, severe, with psychotic and catatonic features  2.   Generalized anxiety disorder  3.   Borderline personality disorder    Background    The following information is obtained from the hospital after visit summary and inpatient provider notes.     \"Legal status: Committed     Patient was admitted to unit 5 due to the aforementioned presentation. The patient was placed under 15 minute checks to ensure patient safety. The patient participated in unit programming and groups as able.     Ms. Parham did not require seclusion/restraint during hospitalization.      We reviewed with Ms. Parham current and past medication trials including duration, dose, response and side effects. During this hospitalization, the following changes to the patient's psychotropic medications were made:     PTA psychotropic medications stopped:      -Xanax-> switched to Ativan prior to admission  -Effexor  mg daily-> tapered down and stopped  -Zyprexa 10 mg at bedtime-> stopped in favor of Abilify     PTA psychotropic medications continued/changed:      -Abilify 2 mg daily-> 5mg daily on 2/17-> 10 mg on 2/20  -Ativan 2 mg TID for catatonia  -Trazodone 50 mg at bedtime prn sleep     New psychotropic medications tried and stopped:      -none     New psychotropic medications initiated:      -Depakote  mg at bedtime-> 1,000 mg on 2/22     Patient was tapered off of Effexor XR and Zyprexa and started on Depakote ER and Abilify for mood stabilization. Patient tolerated these changes well. She felt that Ativan was more helpful than Xanax. Initially upon admission patient had a brief period of not taking medications, which led to increase in depression and poor communication. She did briefly exhibit more signs of catatonia. She did quickly resume medications and continued to take them as scheduled for the remainder of her stay. She denied any side effects from medications and felt they were helping. She was visible in the " "unit milieu and attended more groups over the course of her stay. She was denying any suicidal thoughts by day of discharge. IRTS referrals were sent out and patient was accepted to [facility] today. She will discharge with 30 days of medication and will follow-up with outpatient providers.      With these changes and supports the patient noticed improvement in their symptoms and felt sufficiently ready for discharge. As a result, Misty Parham was discharged to IRTS program. At the time of discharge, Misty Parham was determined to not be a danger to self or others. The patient was also medically stable for discharge. At the current time of discharge, the patient does not meet criteria for involuntary hospitalization. On the day of discharge, the patient reports that they do not have suicidal or homicidal ideation. Steps taken to minimize risk include: assessing patient s behavior and thought process daily during hospital stay, discharging patient with adequate plan for follow up for mental and physical health and discussing safety plan of returning to the hospital should the patient ever have thoughts of harming themselves or others. Therefore, based on all available evidence including the factors cited above, the patient does not appear to be at imminent risk for self-harm, and is appropriate for outpatient level of care. However, if patient uses substances or is medication non-adherent, their risk of decompensation and SI will be elevated. This was discussed with the patient.\"        Post Discharge Assessment   How have your symptoms been since being discharged from the hospital? Message left to call with any questions  Do you have your discharge instructions/after visit summary? NA  Do you have any questions related to your discharge instructions? NA    Discharge Medication Assessment   Medications were reviewed in full on discharge, including: Medications to be started, medications to be " stopped, medications to be continued from preadmission and any side effects.      Prescriptions were e-scribed or sent to their preferred pharmacy at discharge and were able to be filled: NA  Do you have any questions about your medications? NA    Outpatient Plan/Future Appointments  Discharge follow up appointment scheduled within 14 days of discharging from hospital? Yes   Health Care Follow-up:      [facility]-accepted 3/1/24 at 1100  [address] [address]  Phone: [phone]     [facility] -   Betty Cazares- Virtual - 3/5 @ 2:30pm   [address]   [address]   [place] [address]   [phone]      [facility]   Jelena Mackey MD- Virtual - 3/13 @ 5:25pm   [address] [address]   [phone]      Further information, barriers, or follow up this writer has addressed: N/A

## 2024-03-13 ENCOUNTER — VIRTUAL VISIT (OUTPATIENT)
Dept: FAMILY MEDICINE | Facility: CLINIC | Age: 39
End: 2024-03-13
Payer: MEDICARE

## 2024-03-13 DIAGNOSIS — R45.851 SUICIDAL IDEATION: Primary | ICD-10-CM

## 2024-03-13 DIAGNOSIS — F60.3 BORDERLINE PERSONALITY DISORDER (H): ICD-10-CM

## 2024-03-13 DIAGNOSIS — F06.1 MAJOR DEPRESSIVE DISORDER, RECURRENT EPISODE, SEVERE WITH CATATONIA (H): ICD-10-CM

## 2024-03-13 DIAGNOSIS — F33.2 MAJOR DEPRESSIVE DISORDER, RECURRENT EPISODE, SEVERE WITH CATATONIA (H): ICD-10-CM

## 2024-03-13 DIAGNOSIS — F41.1 GENERALIZED ANXIETY DISORDER: ICD-10-CM

## 2024-03-13 PROCEDURE — 99214 OFFICE O/P EST MOD 30 MIN: CPT | Mod: 95 | Performed by: INTERNAL MEDICINE

## 2024-03-13 PROCEDURE — 96127 BRIEF EMOTIONAL/BEHAV ASSMT: CPT | Mod: 95 | Performed by: INTERNAL MEDICINE

## 2024-03-13 RX ORDER — CLONAZEPAM 1 MG/1
TABLET ORAL
COMMUNITY
Start: 2024-03-13

## 2024-03-13 RX ORDER — TRAZODONE HYDROCHLORIDE 150 MG/1
TABLET ORAL
COMMUNITY
Start: 2024-03-05

## 2024-03-13 RX ORDER — OLANZAPINE 5 MG/1
TABLET ORAL
COMMUNITY
Start: 2024-03-13

## 2024-03-13 ASSESSMENT — PATIENT HEALTH QUESTIONNAIRE - PHQ9
10. IF YOU CHECKED OFF ANY PROBLEMS, HOW DIFFICULT HAVE THESE PROBLEMS MADE IT FOR YOU TO DO YOUR WORK, TAKE CARE OF THINGS AT HOME, OR GET ALONG WITH OTHER PEOPLE: EXTREMELY DIFFICULT
SUM OF ALL RESPONSES TO PHQ QUESTIONS 1-9: 27
SUM OF ALL RESPONSES TO PHQ QUESTIONS 1-9: 27

## 2024-03-13 NOTE — PATIENT INSTRUCTIONS
As discussed with you today on your ongoing concerns of suicidal ideation, please complete the inpatient rehab as well as follow-up closely with your psychiatrist at [facility] which I reviewed the chart and her visits with you.    Continue current medications, since all the lab work was done in the recent emergency room visit in February 2024 remaining normal and you are asymptomatic on any concerns which I questioned you I do not think there is need for rechecking the labs at this time.    We can check all the labs during your annual physical upcoming with me.

## 2024-03-13 NOTE — PROGRESS NOTES
Misty is a 38 year old who is being evaluated via a billable video visit.    How would you like to obtain your AVS? MyChart  If the video visit is dropped, the invitation should be resent by: Text to cell phone: [phone]  Will anyone else be joining your video visit? No        Assessment and Plan    1. Suicidal ideation  2. Major depressive disorder, recurrent episode, severe with catatonia (H)  3. Borderline personality disorder (H)  4. Generalized anxiety disorder  Recent hospitalization and discharge from 2/16 to 3/1 with Suicidal ideation, patient has been registered on medications when she was in the hospital and later after she was stabilized with catatonic features improved,she was sent to [facility] for placement.  Patient is currently following [place] [facility] [place] : Betty Cazares, WILLIAN, KARLOS with recent virtual visit with her and changes on medication including Zyprexa as well as Klonopin.  -Discussed with the patient on most part of this appointment given that patient still continues to have suicidal ideation and fortunately she is in inpatient rehab at this time, at [facility] inpatient facility psychiatric rehab for 90 days with RN checks every 2 hours there.   -Emphasized to the patient on any other medical concerns if she has which she declines except the psychological problem at this time.  Discussed on her current medications, side effects if any but she will need to give it some time before the medication actions starts for her since there changed recently.  Patient understood and agreed with plan.        Please note that this note consists of symbols derived from keyboarding, dictation and/or voice recognition software. As a result, there may be errors in the script that have gone undetected. Please consider this when interpreting information found in this chart.    Patient Instructions   As discussed with you today on your ongoing concerns of suicidal ideation, please complete the  inpatient rehab as well as follow-up closely with your psychiatrist at [facility] which I reviewed the chart and her visits with you.    Continue current medications, since all the lab work was done in the recent emergency room visit in February 2024 remaining normal and you are asymptomatic on any concerns which I questioned you I do not think there is need for rechecking the labs at this time.    We can check all the labs during your annual physical upcoming with me.  Return in about 5 months (around 8/23/2024), or if symptoms worsen or fail to improve, for Preventative Visit.    Jelena Mackey MD  [facility] STEFANI Jay is a 38 year old, presenting for the following health issues:  Hospital F/U        3/13/2024     4:56 PM   Additional Questions   Roomed by Bharathi FLORES       [facility]       Hospital Follow-up Visit:    Hospital/Nursing Home/IP Rehab Facility: [facility]  Date of Admission: 2/16  Date of Discharge: 3/1  Reason(s) for Admission: Mental health    Was your hospitalization related to COVID-19? No   Problems taking medications regularly:  None  Medication changes since discharge: Pt is not 100% on current medications that she is taking - states there has been a lot of changes recently  Problems adhering to non-medication therapy:  None    Summary of hospitalization:  [facility] discharge summary reviewed  Diagnostic Tests/Treatments reviewed.  Follow up needed: none  Other Healthcare Providers Involved in Patient s Care:          Psychiatry , IRTS inpatient Rehab   Update since discharge: fluctuating course.         Plan of care communicated with patient             Last seen patient in August 2023 for annual physical at that time, she is here for ER follow-up visit.  She did have recurrent hospitalizations with psychiatric problems including depression and suicide attempt.    MDD, psychosis, IVY, borderline personality disorder,  suicide attempts who presents to the ED for evaluation of SI.     No Known Allergies     Past Medical History:   Diagnosis Date    Depressive disorder     Generalized anxiety disorder 5/15/2020       Past Surgical History:   Procedure Laterality Date    CHOLECYSTECTOMY         Family History   Problem Relation Age of Onset    Diabetes Father        Social History     Tobacco Use    Smoking status: Never    Smokeless tobacco: Never   Substance Use Topics    Alcohol use: Not Currently        Current Outpatient Medications   Medication    ARIPiprazole (ABILIFY) 10 MG tablet    clonazePAM (KLONOPIN) 1 MG tablet    divalproex sodium extended-release (DEPAKOTE ER) 500 MG 24 hr tablet    docusate sodium (COLACE) 100 MG capsule    hydrOXYzine HCl (ATARAX) 25 MG tablet    LORazepam (ATIVAN) 2 MG tablet    melatonin 3 MG tablet    metFORMIN (GLUCOPHAGE) 500 MG tablet    miconazole (MICATIN) 2 % external powder    OLANZapine (ZYPREXA) 5 MG tablet    traZODone (DESYREL) 150 MG tablet    traZODone (DESYREL) 50 MG tablet    polyethylene glycol (MIRALAX) 17 GM/Dose powder     No current facility-administered medications for this visit.        Review of Systems  Constitutional, HEENT, cardiovascular, pulmonary, GI, , musculoskeletal, neuro, skin, endocrine and psych systems are negative, except as otherwise noted.      Objective           Vitals:  No vitals were obtained today due to virtual visit.    Physical Exam   GENERAL: alert and no distress  EYES: Eyes grossly normal to inspection.  No discharge or erythema, or obvious scleral/conjunctival abnormalities.  RESP: No audible wheeze, cough, or visible cyanosis.    SKIN: Visible skin clear. No significant rash, abnormal pigmentation or lesions.  NEURO: Cranial nerves grossly intact.  Mentation and speech appropriate for age.  PSYCH: Appropriate affect, tone, and pace of words      Video-Visit Details    Type of service:  Video Visit   Originating Location (pt. Location):  Home    Distant Location (provider location):  On-site  Platform used for Video Visit: Nida  Signed Electronically by: Jelena Mackey MD

## 2024-03-14 ENCOUNTER — PATIENT OUTREACH (OUTPATIENT)
Dept: CARE COORDINATION | Facility: CLINIC | Age: 39
End: 2024-03-14
Payer: MEDICARE

## 2024-03-14 NOTE — PROGRESS NOTES
Clinic Care Coordination Contact  Care Coordination Clinician Chart Review    Situation: Patient chart reviewed by care coordinator.    Background: Clinic Care Coordination Referral placed by PCP.    Assessment: Upon chart review, patient is not a candidate for Primary Care Clinic Care Coordination enrollment due to reason stated below:  Pt will be in IRTS for about 90 days.      Plan/Recommendations: Clinic Care Coordination Referral/order cancelled. RN/SW CC will perform no further monitoring/outreaches at this time and will remain available as needed. If new needs arise, a new Care Coordination Referral may be placed.    Maya Gómez,  [facility]  Clinic Care Coordinator  [facility]  [phone]  [email or web address]

## 2024-03-23 ENCOUNTER — HEALTH MAINTENANCE LETTER (OUTPATIENT)
Age: 39
End: 2024-03-23

## 2024-04-05 ENCOUNTER — VIRTUAL VISIT (OUTPATIENT)
Dept: FAMILY MEDICINE | Facility: CLINIC | Age: 39
End: 2024-04-05
Attending: INTERNAL MEDICINE
Payer: MEDICARE

## 2024-04-05 DIAGNOSIS — F33.41 DEPRESSION, MAJOR, RECURRENT, IN PARTIAL REMISSION (H): ICD-10-CM

## 2024-04-05 DIAGNOSIS — F33.2 SEVERE EPISODE OF RECURRENT MAJOR DEPRESSIVE DISORDER, WITHOUT PSYCHOTIC FEATURES (H): ICD-10-CM

## 2024-04-05 DIAGNOSIS — E66.01 MORBID OBESITY (H): Primary | ICD-10-CM

## 2024-04-05 DIAGNOSIS — F29 PSYCHOSIS, UNSPECIFIED PSYCHOSIS TYPE (H): ICD-10-CM

## 2024-04-05 DIAGNOSIS — R45.851 SUICIDAL IDEATION: ICD-10-CM

## 2024-04-05 PROCEDURE — 99214 OFFICE O/P EST MOD 30 MIN: CPT | Mod: 95 | Performed by: INTERNAL MEDICINE

## 2024-04-05 RX ORDER — OLANZAPINE 10 MG/1
TABLET ORAL
COMMUNITY
Start: 2024-03-15

## 2024-04-05 ASSESSMENT — ANXIETY QUESTIONNAIRES
7. FEELING AFRAID AS IF SOMETHING AWFUL MIGHT HAPPEN: SEVERAL DAYS
4. TROUBLE RELAXING: NEARLY EVERY DAY
GAD7 TOTAL SCORE: 15
GAD7 TOTAL SCORE: 15
6. BECOMING EASILY ANNOYED OR IRRITABLE: SEVERAL DAYS
5. BEING SO RESTLESS THAT IT IS HARD TO SIT STILL: SEVERAL DAYS
2. NOT BEING ABLE TO STOP OR CONTROL WORRYING: NEARLY EVERY DAY
1. FEELING NERVOUS, ANXIOUS, OR ON EDGE: NEARLY EVERY DAY
7. FEELING AFRAID AS IF SOMETHING AWFUL MIGHT HAPPEN: SEVERAL DAYS
GAD7 TOTAL SCORE: 15
8. IF YOU CHECKED OFF ANY PROBLEMS, HOW DIFFICULT HAVE THESE MADE IT FOR YOU TO DO YOUR WORK, TAKE CARE OF THINGS AT HOME, OR GET ALONG WITH OTHER PEOPLE?: SOMEWHAT DIFFICULT
3. WORRYING TOO MUCH ABOUT DIFFERENT THINGS: NEARLY EVERY DAY
IF YOU CHECKED OFF ANY PROBLEMS ON THIS QUESTIONNAIRE, HOW DIFFICULT HAVE THESE PROBLEMS MADE IT FOR YOU TO DO YOUR WORK, TAKE CARE OF THINGS AT HOME, OR GET ALONG WITH OTHER PEOPLE: SOMEWHAT DIFFICULT

## 2024-04-05 ASSESSMENT — PATIENT HEALTH QUESTIONNAIRE - PHQ9
SUM OF ALL RESPONSES TO PHQ QUESTIONS 1-9: 19
SUM OF ALL RESPONSES TO PHQ QUESTIONS 1-9: 19
10. IF YOU CHECKED OFF ANY PROBLEMS, HOW DIFFICULT HAVE THESE PROBLEMS MADE IT FOR YOU TO DO YOUR WORK, TAKE CARE OF THINGS AT HOME, OR GET ALONG WITH OTHER PEOPLE: SOMEWHAT DIFFICULT

## 2024-04-05 NOTE — PROGRESS NOTES
Misty is a 38 year old who is being evaluated via a billable video visit.    How would you like to obtain your AVS? MyChart  If the video visit is dropped, the invitation should be resent by: Text to cell phone: [phone]  Will anyone else be joining your video visit? No        Assessment and Plan  1. Morbid obesity (H)  Ongoing problem, uncontrolled.  Patient last weight was 299 pounds, discussed on various options of weight reduction including pharmacological therapies which patient is not opting for GLP-1 agonist offered to her at this time.  Shared decision to increase the dose of metformin from current 500 mg twice daily to 1000 mg twice daily.  Patient is willing to try additional oral weight loss therapies but not injection forms.  Will discuss further on the follow-up visit in August 2024 with recheck of her weight on this increased dose of metformin.  Patient understood and agreed with the plan.  - metFORMIN (GLUCOPHAGE) 1000 MG tablet; Take 1 tablet (1,000 mg) by mouth 2 times daily (with meals)  Dispense: 180 tablet; Refill: 1    2. Depression, major, recurrent, in partial remission (H24)  3. Psychosis, unspecified psychosis type (H)  4. Severe episode of recurrent major depressive disorder, without psychotic features (H)  5. Suicidal ideation  Ongoing problem, improving.  After I have last seen patient on March 13, 2024 for hospital follow-up visit on suicidal ideation she had at that time.    Follows Psychiatrist at [facility] as of 3/13/2024 : Betty Cazares, WILLIAN, CNP >> Just seen her last week on 3/27 with adjustment of medication  Zyprexa 5 mg lunchtime. Continue 10 mg HS and 5 mg AM. Total 20 mg PO daily. All other medications at same dose.   - Currently pt is in inpatient rehab at this time, at [facility] - psychiatric rehab who can check on her the patient has ordered of intermittent flareups of suicidal ideation but does not have any plans.  Emphasized on the safety plan as mentioned in the AVS below.   Patient understood and agreed to plan.         Please note that this note consists of symbols derived from keyboarding, dictation and/or voice recognition software. As a result, there may be errors in the script that have gone undetected. Please consider this when interpreting information found in this chart.    Patient Instructions   As discussed we will increase the dose of your metformin from current 500 mg twice daily to 1000 mg twice daily for improvement on weight loss.  Will consider checking your lab work in your upcoming annual physical with me.    Please follow-up with recommendations on increased Zyprexa dosage as managed by a psychiatrist and keep up the follow-up appointments with her regularly.    Please follow the safety plan below for any worsening symptoms.    Safety plan reviewed. To the Emergency Department as needed or call after hours crisis line at [phone] or [phone]. [place] Crisis Text Line: Text [place] to 414969  or  Suicide LifeLine Chat: suicideProcessUnity.org/chat/  Follow up with primary care provider as planned or for acute medical concerns.        Crisis Resources:    National Suicide Prevention Lifeline: 190.659.8686 (TTY: 558.776.5764). Call anytime for help.  (www.suicidepreventionlifeline.org)  National [place] on Mental Illness (www.[place].org): 508.864.2868 or 428-303-2474.   Mental Health Association (www.mentalhealth.org): 713.735.9440 or 672-746-8973.  [place] Crisis Text Line: Text [place] to 959821  Suicide LifeLine Chat: suicideProcessUnity.org/chat        Return in about 20 weeks (around 8/23/2024), or if symptoms worsen or fail to improve, for Preventative Visit.    Jelena Mackey MD  [facility] STEFANI Jay is a 38 year old, presenting for the following health issues:   Follow Up        4/5/2024     3:29 PM   Additional Questions   Roomed by Netta MAXWELL       History of Present Illness       Mental Health  Follow-up:  Patient presents to follow-up on Depression & Anxiety.Patient's depression since last visit has been:  Better  The patient is not having other symptoms associated with depression.  Patient's anxiety since last visit has been:  Better  The patient is not having other symptoms associated with anxiety.  Any significant life events: relationship concerns  Patient is not feeling anxious or having panic attacks.  Patient has no concerns about alcohol or drug use.        Last seen patient on March 13, 2024 for hospital follow-up visit on suicidal ideation she had at that time. Does have PMH of MDD, psychosis, IVY, borderline personality disorder, suicide attempts      No Known Allergies     Past Medical History:   Diagnosis Date    Depressive disorder     Generalized anxiety disorder 5/15/2020       Past Surgical History:   Procedure Laterality Date    CHOLECYSTECTOMY         Family History   Problem Relation Age of Onset    Diabetes Father        Social History     Tobacco Use    Smoking status: Never    Smokeless tobacco: Never   Substance Use Topics    Alcohol use: Not Currently        Current Outpatient Medications   Medication Sig Dispense Refill    metFORMIN (GLUCOPHAGE) 1000 MG tablet Take 1 tablet (1,000 mg) by mouth 2 times daily (with meals) 180 tablet 1    ARIPiprazole (ABILIFY) 10 MG tablet Take 1 tablet (10 mg) by mouth daily 30 tablet 0    clonazePAM (KLONOPIN) 1 MG tablet Take 1 tablet by mouth Twice daily. Discontinue/stop taking Lorazepam      divalproex sodium extended-release (DEPAKOTE ER) 500 MG 24 hr tablet Take 2 tablets (1,000 mg) by mouth at bedtime 60 tablet 0    docusate sodium (COLACE) 100 MG capsule Take 1 capsule (100 mg) by mouth 2 times daily 60 capsule 0    hydrOXYzine HCl (ATARAX) 25 MG tablet Take 1 tablet (25 mg) by mouth every 6 hours as needed for anxiety 60 tablet 0    LORazepam (ATIVAN) 2 MG tablet Take 1 tablet (2 mg) by mouth 3 times daily 90 tablet 0    melatonin 3 MG  tablet Take 3 mg by mouth at bedtime      miconazole (MICATIN) 2 % external powder Apply topically 2 times daily to rash under breasts 85 g 0    OLANZapine (ZYPREXA) 10 MG tablet  (Patient not taking: Reported on 4/5/2024)      OLANZapine (ZYPREXA) 5 MG tablet Take 2 tablets (10 mg) by mouth bedtime. After 2 days can start taking 1 tablet (5 mg) by mouth in morning. Total 15 mg PO daily. Stop taking Abilify (Patient not taking: Reported on 4/5/2024)      polyethylene glycol (MIRALAX) 17 GM/Dose powder Take 1 Capful by mouth daily (Patient not taking: Reported on 3/13/2024)      traZODone (DESYREL) 150 MG tablet       traZODone (DESYREL) 50 MG tablet Take 1 tablet (50 mg) by mouth nightly as needed for sleep (Patient taking differently: Take 50 mg by mouth at bedtime) 30 tablet 0     No current facility-administered medications for this visit.        Review of Systems  Constitutional, HEENT, cardiovascular, pulmonary, GI, , musculoskeletal, neuro, skin, endocrine and psych systems are negative, except as otherwise noted.      Objective           Vitals:  No vitals were obtained today due to virtual visit.    Physical Exam   GENERAL: alert and no distress  EYES: Eyes grossly normal to inspection.  No discharge or erythema, or obvious scleral/conjunctival abnormalities.  RESP: No audible wheeze, cough, or visible cyanosis.    SKIN: Visible skin clear. No significant rash, abnormal pigmentation or lesions.  NEURO: Cranial nerves grossly intact.  Mentation and speech appropriate for age.  PSYCH: Appropriate affect, tone, and pace of words      Video-Visit Details    Type of service:  Video Visit   Originating Location (pt. Location): Home    Distant Location (provider location):  On-site  Platform used for Video Visit: Nida  Signed Electronically by: Jelena Mackey MD

## 2024-04-05 NOTE — PATIENT INSTRUCTIONS
As discussed we will increase the dose of your metformin from current 500 mg twice daily to 1000 mg twice daily for improvement on weight loss.  Will consider checking your lab work in your upcoming annual physical with me.    Please follow-up with recommendations on increased Zyprexa dosage as managed by a psychiatrist and keep up the follow-up appointments with her regularly.    Please follow the safety plan below for any worsening symptoms.    Safety plan reviewed. To the Emergency Department as needed or call after hours crisis line at [phone] or [phone]. [place] Crisis Text Line: Text [place] to 960414  or  Suicide LifeLine Chat: suicideSteelBrick.org/chat/  Follow up with primary care provider as planned or for acute medical concerns.        Crisis Resources:    National Suicide Prevention Lifeline: 764.489.3966 (TTY: 770.594.3008). Call anytime for help.  (www.suicidepreventionlifeline.org)  National [place] on Mental Illness (www.davian.org): 615.248.2671 or 883-992-0815.   Mental Health Association (www.mentalhealth.org): 553.770.4323 or 928-099-5945.  [place] Crisis Text Line: Text [place] to 128875  Suicide LifeLine Chat: suicideSteelBrick.org/chat

## 2024-04-23 ENCOUNTER — TRANSFERRED RECORDS (OUTPATIENT)
Dept: HEALTH INFORMATION MANAGEMENT | Facility: CLINIC | Age: 39
End: 2024-04-23
Payer: MEDICARE

## 2024-06-17 ENCOUNTER — OFFICE VISIT (OUTPATIENT)
Dept: URGENT CARE | Facility: URGENT CARE | Age: 39
End: 2024-06-17
Payer: MEDICARE

## 2024-06-17 ENCOUNTER — OFFICE VISIT (OUTPATIENT)
Dept: OPTOMETRY | Facility: CLINIC | Age: 39
End: 2024-06-17
Payer: MEDICARE

## 2024-06-17 VITALS
WEIGHT: 293 LBS | HEART RATE: 93 BPM | TEMPERATURE: 98.1 F | OXYGEN SATURATION: 92 % | RESPIRATION RATE: 16 BRPM | BODY MASS INDEX: 50.59 KG/M2

## 2024-06-17 DIAGNOSIS — B02.30 HERPES ZOSTER WITH OPHTHALMIC COMPLICATION, UNSPECIFIED HERPES ZOSTER EYE DISEASE: Primary | ICD-10-CM

## 2024-06-17 DIAGNOSIS — B02.30 OPHTHALMIC HERPES ZOSTER: Primary | ICD-10-CM

## 2024-06-17 PROCEDURE — 92002 INTRM OPH EXAM NEW PATIENT: CPT | Performed by: OPTOMETRIST

## 2024-06-17 PROCEDURE — 99213 OFFICE O/P EST LOW 20 MIN: CPT | Performed by: EMERGENCY MEDICINE

## 2024-06-17 RX ORDER — VALACYCLOVIR HYDROCHLORIDE 1 G/1
1000 TABLET, FILM COATED ORAL 3 TIMES DAILY
Qty: 21 TABLET | Refills: 0 | Status: SHIPPED | OUTPATIENT
Start: 2024-06-17 | End: 2024-06-24

## 2024-06-17 ASSESSMENT — PAIN SCALES - GENERAL: PAINLEVEL: MILD PAIN (3)

## 2024-06-17 ASSESSMENT — EXTERNAL EXAM - RIGHT EYE: OD_EXAM: NORMAL

## 2024-06-17 ASSESSMENT — VISUAL ACUITY
OS_SC: 20/50
OD_PH_SC: 20/30
OS_SC+: -2
OD_SC: 20/50
METHOD: SNELLEN - LINEAR

## 2024-06-17 ASSESSMENT — CUP TO DISC RATIO
OD_RATIO: 0.15
OS_RATIO: 0.15

## 2024-06-17 ASSESSMENT — SLIT LAMP EXAM - LIDS: COMMENTS: NORMAL

## 2024-06-17 ASSESSMENT — TONOMETRY
IOP_METHOD: APPLANATION
OS_IOP_MMHG: 13
OD_IOP_MMHG: 11

## 2024-06-17 NOTE — PROGRESS NOTES
Assessment & Plan     Diagnosis:    ICD-10-CM    1. Ophthalmic herpes zoster  B02.30 valACYclovir (VALTREX) 1000 mg tablet    left          Medical Decision Making:  Misty Parham is a 38 year old female who presents for evaluation of painful rash on face.  This is consistent clinically with herpes zoster, concerning as in the V1 distribution of CN V.  Will start valacyclovir for this as noted above.  No signs at this point of disseminated zoster.  Patient is not immunocompromised and I see no signs of systemic illness that might have lead to this.   See primary care doctor in follow up for recheck and refill of pain medicine if needed.   Fortunately I was able to get her in today to see optometry in clinic here and they will do thorough eye exam. Patient verbalizes understanding and agreement with the plan including reasons to go to the ER. All questions answered.       Jori Ca PA-C  [facility]    Subjective     Misty Parham is a 38 year old female who presents to clinic today for the following health issues:  Chief Complaint   Patient presents with    Derm Problem     Rash on face beginning Friday. Patient unsure of cause of rash. Patient states rash is worsening. Patient states rash burns and itches.       HPI  Patient with a burning/itchy rash starting on Friday, states that it was more bumpy with fluid-filled lumps initially, the seem to blister little bit, there is still one on her left eyelid region.  She notes it is pretty painful, there is some swelling of the eyelid.  She notes her vision is maybe a little bit blurry, but really has not noticed much of a difference but has not had a eye exam for awhile. No fevers, vision lose, ear pain or discharge, injuries or numbness/weakness in the face.     Review of Systems    See HPI    Objective      Vitals: Pulse 93   Temp 98.1  F (36.7  C) (Tympanic)   Resp 16   Wt 137.9 kg (304 lb)   SpO2 92%   BMI 50.59 kg/m        Patient  Vitals for the past 24 hrs:   Temp Temp src Pulse Resp SpO2 Weight   06/17/24 1054 98.1  F (36.7  C) Tympanic 93 16 92 % 137.9 kg (304 lb)       Vital signs reviewed by: Jori Ca PA-C    Physical Exam   Constitutional: Patient is alert and cooperative. No acute distress.  Ears: Right TM is normal. Left TM is normal. External ear canals are normal.  Mouth: Mucous membranes are moist. Normal tongue and tonsil. Posterior oropharynx is clear.  Skin/Eyes: left eyelid with vesicles on erythematous base. More erythematous rash without blisters or obvious vesicles throughout the V1 distribution on the left. Conjunctivae, EOMI are normal. PERRL.  Cardiovascular: Regular rate and rhythm  Pulmonary/Chest: Lungs are clear to auscultation throughout. Effort normal. No respiratory distress. No wheezes, rales or rhonchi.  Neurological: Alert and oriented x3. CN 3-7 and 9-12 intact.   Psychiatric:The patient has a normal mood and affect.     Jori Ca PA-C, June 17, 2024

## 2024-06-17 NOTE — LETTER
6/17/2024      Misty Parham  [address] [place] [address]      Dear Colleague,    Thank you for referring your patient, Misty Parham, to the [facility]. Please see a copy of my visit note below.    Chief Complaint   Patient presents with     Eye Problem Left Eye     Rash on face beginning Friday. Patient unsure of cause of rash. Patient states rash is worsening. Patient states rash burns and itches. Left eye is swelling. Watering, burning and itching as well. Vision does not seem to be affected.        Lizette Cárdenas - Optometric Assistant     See Review Of Systems       Medical, surgical and family histories reviewed and updated 6/17/2024.         OBJECTIVE: See Ophthalmology exam    ASSESSMENT:    ICD-10-CM    1. Herpes zoster with ophthalmic complication, unspecified herpes zoster eye disease  B02.30          PLAN:    Patient Instructions   Start viral medication as prescribed in Urgent Care.    Non preserved artificial tears- 1 drop both eyes 4 x day.    Cool compresses.    Ocusoft lid scrubs at night and in am or Ocusoft Hypochlor- spray solution onto cotton pad.  Close eyes and gently apply to eyelids and eyelashes using side to side motion.  Use morning and evening.    Return in 1 week for recheck or sooner if needed.    Dinesh Renee OD         Again, thank you for allowing me to participate in the care of your patient.        Sincerely,        Dinesh Renee OD

## 2024-06-17 NOTE — PROGRESS NOTES
Chief Complaint   Patient presents with    Eye Problem Left Eye     Rash on face beginning Friday. Patient unsure of cause of rash. Patient states rash is worsening. Patient states rash burns and itches. Left eye is swelling. Watering, burning and itching as well. Vision does not seem to be affected.        Lizette Cárdenas - Optometric Assistant     See Review Of Systems       Medical, surgical and family histories reviewed and updated 6/17/2024.         OBJECTIVE: See Ophthalmology exam    ASSESSMENT:    ICD-10-CM    1. Herpes zoster with ophthalmic complication, unspecified herpes zoster eye disease  B02.30          PLAN:    Patient Instructions   Start viral medication as prescribed in Urgent Care.    Non preserved artificial tears- 1 drop both eyes 4 x day.    Cool compresses.    Ocusoft lid scrubs at night and in am or Ocusoft Hypochlor- spray solution onto cotton pad.  Close eyes and gently apply to eyelids and eyelashes using side to side motion.  Use morning and evening.    Return in 1 week for recheck or sooner if needed.    Dinesh Renee, OD

## 2024-06-17 NOTE — PATIENT INSTRUCTIONS
Start viral medication as prescribed in Urgent Care.    Non preserved artificial tears- 1 drop both eyes 4 x day.    Cool compresses.    Ocusoft lid scrubs at night and in am or Ocusoft Hypochlor- spray solution onto cotton pad.  Close eyes and gently apply to eyelids and eyelashes using side to side motion.  Use morning and evening.    Return in 1 week for recheck or sooner if needed.    Dinesh Renee, OD    There is a combination of three treatments which can greatly improve symptoms of dry eyes.     Artificial tears  Heat (eyes closed)  Eyelid and eyelash cleansing (eyes closed)     Use one drop of artificial tears both eyes 4 x daily.  Once in the morning, lunch, dinner and bedtime. Continue to use the drops regardless if your eyes are comfortable or not.  Artificial tears work best as a preventative and not as well after your eyes are starting to bother you.  It may take 4- 6 weeks of using the drops before you notice improvement.  If after that time you are still having problems schedule an appointment for an evaluation and discussion of different treatments such as Restasis or Xiidra.  Dry eyes are a chronic condition and you may have more symptoms at certain times of the year.    Excess tearing can be due to the right tears not working properly or a blockage in the tear drainage system.  You can try using artificial tears 1 drop both eyes 4 x day.  If the excess tearing is bothersome after 4-6 weeks of treatment then we can send you for further testing.  This would entail a referral to our oculoplastic specialist Dr. Marta Garcia at the [facility]-[phone].    Recommended brands are:    Systane Complete  Systane Ultra  Systane Balance  Refresh Advanced Optive  Refresh Relieva  Blink    Recommended brands for contact lens wearers are:    Systane contacts  Refresh contacts  Blink contacts    If you are using drops more than 4 x day or have sensitivities to preservatives I recommend non preserved  artificial tears.  These come in 1 use vials.  They can be used every 1-2 hours.  Do not reuse the vials.    Recommended brands are:    Refresh Optive Andrea-3  Systane- preservative free  Refresh-  preservative free  Blink- preservative free    Gels or ointment can be used at night.    Recommended brands are:    Systane Gel  Refresh Gel  Blink Gel  Genteal Gel    Systane night time (ointment)  Refresh Celluvisc  Refresh PM (ointment)    Optase dry eye spray.  Spray to eyelids 3-4 x daily.  This can be purchased on Amazon.      Visine, Clear Eyes or Murine (drops that get the red out) can irritate the eyes and cause a rebound effect where the eyes become more red and you end up using more drops.  Avoid drops containing tetrahydrozoline, naphazoline, phenylephrine, oxymetazoline.      OTC Lumify is a newer product that gives immediate redness relief without the rebound effect.  Use as needed to take the redness out.    Artificial tears may be used with other drops (such as allergy, glaucoma, antibiotics) around the same time.  Be sure to wait 5 minutes in between drops.    Heat to the eyelids can also improve your symptoms of dry eyes.  Kashif heat masks can be purchased at Amazon to be used nightly for 10-15 minutes.  Other options are gel masks that can be put in the microwave and purchased at most pharmacies.      Tea Tree Oil eyelid cleansers recommended are Ocusoft Oust foam cleanser to cleanse eyelids/lashes at night and in the am. Other options are Blephadex or Cliradex eyelid wipes.  KEEP EYES CLOSED when using these products.  These can be purchased on amazon.com   A good product for make up remover with tea tree oil is WeLoveEyes.  This can be found at www.simfy or xTV.    Other good eyelid cleansers have hypochlorous which removes excess bacteria and is safe around the eyes. Products are Avenova, Ocusoft Hypochlor or Heyedrate. Spray solution onto cotton pad, close eyes and gently apply to  eyelids and eyelashes using side to side motion.  You can also KEEP EYES CLOSED spray and rub into eyelashes.  You do not need to rinse it off. Use morning and evening. These products can be found on Amazon.  You can check with your local pharmacy and see if they can order if for you if they don't have it.    Other brands of eyelid cleansing wipes are:    Ocusoft wipes  Systane wipes    A great eye make up line is https://[email or web address]        Optometry Providers       Clinic Locations                                 Telephone Number   Dr. Hellen Crook    [place]   [facility] [place] [phone]     [facility] Hours:                [facility] Hours:       [facility] Hours:   [address]   [address] [facility] [address]     [address] [facility]  [place] [address]   [place] [address]    [place] [address]  Phone: [phone]                    Phone: [phone]     Phone: [phone]                      Monday 8:00-6:00                          Monday 8:00-6:00                          Monday 8:00-6:00              Tuesday 8:00-6:00                          Tuesday 8:00-6:00                          Tuesday 8:00-6:00              Wednesday 8:00-6:00                  Wednesday 8:00-6:00                   Wednesday 8:00-6:00      Thursday 8:00-6:00                        Thursday 8:00-6:00                         Thursday 8:00-6:00            Friday 8:00-5:00                              Friday 8:00-5:00                              Friday 8:00-5:00    [facility] Hours:   [address] [facility] [place] [id number] [phone]    Monday 9:00-6:00  Tuesday 9:00-6:00  Wednesday 9:00-6:00  Thursday 9:00-6:00  Friday 9:00-5:00  As always, Thank you for trusting us with your health care needs!

## 2024-07-29 ENCOUNTER — MYC MEDICAL ADVICE (OUTPATIENT)
Dept: FAMILY MEDICINE | Facility: CLINIC | Age: 39
End: 2024-07-29
Payer: MEDICARE

## 2024-08-01 NOTE — TELEPHONE ENCOUNTER
I have not seen this patient since more than 1 year except virtual visit for hospital follow-up.    Will need to await for upcoming physical as scheduled on August 2024 for doing this paperwork.    Will keep this on my desk with the appointment noted on it.  Please let the patient know.    Thank you,  Jelena Mackey MD on 7/31/2024

## 2024-08-22 NOTE — TELEPHONE ENCOUNTER
Allison called to confirm that this form will be completed at the patient's appointment on 8/23. Writer notified her that form was received via [employer].    Once forms are completed, please fax to Marleny at [phone]

## 2024-08-23 NOTE — TELEPHONE ENCOUNTER
Pt was a noshow today which she was scheduled for me to take care of her paperwork as requested.     Please reschedule and let them know .     Thank you  Jelena Mackey MD on 8/23/2024 at 11:38 AM

## 2024-08-27 ENCOUNTER — TELEPHONE (OUTPATIENT)
Dept: FAMILY MEDICINE | Facility: CLINIC | Age: 39
End: 2024-08-27

## 2024-08-27 ENCOUNTER — OFFICE VISIT (OUTPATIENT)
Dept: FAMILY MEDICINE | Facility: CLINIC | Age: 39
End: 2024-08-27
Payer: MEDICARE

## 2024-08-27 ENCOUNTER — MEDICAL CORRESPONDENCE (OUTPATIENT)
Dept: HEALTH INFORMATION MANAGEMENT | Facility: CLINIC | Age: 39
End: 2024-08-27

## 2024-08-27 VITALS
SYSTOLIC BLOOD PRESSURE: 108 MMHG | HEART RATE: 97 BPM | BODY MASS INDEX: 47.09 KG/M2 | HEIGHT: 66 IN | DIASTOLIC BLOOD PRESSURE: 72 MMHG | OXYGEN SATURATION: 97 % | RESPIRATION RATE: 14 BRPM | WEIGHT: 293 LBS | TEMPERATURE: 97.4 F

## 2024-08-27 DIAGNOSIS — R45.89 SUICIDAL BEHAVIOR WITHOUT ATTEMPTED SELF-INJURY: ICD-10-CM

## 2024-08-27 DIAGNOSIS — F29 PSYCHOSIS, UNSPECIFIED PSYCHOSIS TYPE (H): ICD-10-CM

## 2024-08-27 DIAGNOSIS — Z13.220 ENCOUNTER FOR SCREENING FOR LIPID DISORDER: ICD-10-CM

## 2024-08-27 DIAGNOSIS — Z00.00 ENCOUNTER FOR MEDICARE ANNUAL WELLNESS EXAM: Primary | ICD-10-CM

## 2024-08-27 DIAGNOSIS — Z02.89 ENCOUNTER FOR COMPLETION OF FORM WITH PATIENT: ICD-10-CM

## 2024-08-27 DIAGNOSIS — F51.04 CHRONIC INSOMNIA: ICD-10-CM

## 2024-08-27 DIAGNOSIS — E66.01 MORBID OBESITY (H): ICD-10-CM

## 2024-08-27 DIAGNOSIS — E78.1 HYPERTRIGLYCERIDEMIA: ICD-10-CM

## 2024-08-27 DIAGNOSIS — F41.1 GENERALIZED ANXIETY DISORDER: ICD-10-CM

## 2024-08-27 DIAGNOSIS — Z13.21 ENCOUNTER FOR VITAMIN DEFICIENCY SCREENING: ICD-10-CM

## 2024-08-27 DIAGNOSIS — E66.89 OTHER OBESITY: ICD-10-CM

## 2024-08-27 DIAGNOSIS — F33.1 MODERATE EPISODE OF RECURRENT MAJOR DEPRESSIVE DISORDER (H): ICD-10-CM

## 2024-08-27 DIAGNOSIS — Z13.1 ENCOUNTER FOR SCREENING FOR DIABETES MELLITUS: ICD-10-CM

## 2024-08-27 DIAGNOSIS — Z12.31 VISIT FOR SCREENING MAMMOGRAM: ICD-10-CM

## 2024-08-27 PROBLEM — F32.A DEPRESSION, UNSPECIFIED DEPRESSION TYPE: Status: RESOLVED | Noted: 2024-01-31 | Resolved: 2024-08-27

## 2024-08-27 PROBLEM — F33.2 SEVERE EPISODE OF RECURRENT MAJOR DEPRESSIVE DISORDER, WITHOUT PSYCHOTIC FEATURES (H): Status: RESOLVED | Noted: 2018-01-15 | Resolved: 2024-08-27

## 2024-08-27 LAB
ALBUMIN SERPL BCG-MCNC: 4.2 G/DL (ref 3.5–5.2)
ALP SERPL-CCNC: 59 U/L (ref 40–150)
ALT SERPL W P-5'-P-CCNC: 30 U/L (ref 0–50)
ANION GAP SERPL CALCULATED.3IONS-SCNC: 14 MMOL/L (ref 7–15)
AST SERPL W P-5'-P-CCNC: 40 U/L (ref 0–45)
BILIRUB SERPL-MCNC: 0.4 MG/DL
BUN SERPL-MCNC: 9.8 MG/DL (ref 6–20)
CALCIUM SERPL-MCNC: 9.6 MG/DL (ref 8.8–10.4)
CHLORIDE SERPL-SCNC: 102 MMOL/L (ref 98–107)
CHOLEST SERPL-MCNC: 177 MG/DL
CREAT SERPL-MCNC: 0.92 MG/DL (ref 0.51–0.95)
EGFRCR SERPLBLD CKD-EPI 2021: 80 ML/MIN/1.73M2
ERYTHROCYTE [DISTWIDTH] IN BLOOD BY AUTOMATED COUNT: 13.2 % (ref 10–15)
FASTING STATUS PATIENT QL REPORTED: YES
FASTING STATUS PATIENT QL REPORTED: YES
GLUCOSE SERPL-MCNC: 93 MG/DL (ref 70–99)
HBA1C MFR BLD: 5.1 % (ref 0–5.6)
HCO3 SERPL-SCNC: 24 MMOL/L (ref 22–29)
HCT VFR BLD AUTO: 44.7 % (ref 35–47)
HDLC SERPL-MCNC: 35 MG/DL
HGB BLD-MCNC: 14.9 G/DL (ref 11.7–15.7)
LDLC SERPL CALC-MCNC: 98 MG/DL
MCH RBC QN AUTO: 31.1 PG (ref 26.5–33)
MCHC RBC AUTO-ENTMCNC: 33.3 G/DL (ref 31.5–36.5)
MCV RBC AUTO: 93 FL (ref 78–100)
NONHDLC SERPL-MCNC: 142 MG/DL
PLATELET # BLD AUTO: 224 10E3/UL (ref 150–450)
POTASSIUM SERPL-SCNC: 4.4 MMOL/L (ref 3.4–5.3)
PROT SERPL-MCNC: 7.4 G/DL (ref 6.4–8.3)
RBC # BLD AUTO: 4.79 10E6/UL (ref 3.8–5.2)
SODIUM SERPL-SCNC: 140 MMOL/L (ref 135–145)
TRIGL SERPL-MCNC: 222 MG/DL
TSH SERPL DL<=0.005 MIU/L-ACNC: 3.33 UIU/ML (ref 0.3–4.2)
VIT D+METAB SERPL-MCNC: 35 NG/ML (ref 20–50)
WBC # BLD AUTO: 5.4 10E3/UL (ref 4–11)

## 2024-08-27 PROCEDURE — 83036 HEMOGLOBIN GLYCOSYLATED A1C: CPT | Performed by: INTERNAL MEDICINE

## 2024-08-27 PROCEDURE — G0438 PPPS, INITIAL VISIT: HCPCS | Performed by: INTERNAL MEDICINE

## 2024-08-27 PROCEDURE — 85027 COMPLETE CBC AUTOMATED: CPT | Performed by: INTERNAL MEDICINE

## 2024-08-27 PROCEDURE — 82306 VITAMIN D 25 HYDROXY: CPT | Performed by: INTERNAL MEDICINE

## 2024-08-27 PROCEDURE — 80053 COMPREHEN METABOLIC PANEL: CPT | Performed by: INTERNAL MEDICINE

## 2024-08-27 PROCEDURE — 80061 LIPID PANEL: CPT | Performed by: INTERNAL MEDICINE

## 2024-08-27 PROCEDURE — 99214 OFFICE O/P EST MOD 30 MIN: CPT | Mod: 25 | Performed by: INTERNAL MEDICINE

## 2024-08-27 PROCEDURE — 84443 ASSAY THYROID STIM HORMONE: CPT | Performed by: INTERNAL MEDICINE

## 2024-08-27 PROCEDURE — 36415 COLL VENOUS BLD VENIPUNCTURE: CPT | Performed by: INTERNAL MEDICINE

## 2024-08-27 RX ORDER — PRAZOSIN HYDROCHLORIDE 2 MG/1
CAPSULE ORAL
COMMUNITY
Start: 2024-08-15

## 2024-08-27 RX ORDER — HYDROXYZINE HYDROCHLORIDE 50 MG/1
TABLET, FILM COATED ORAL
COMMUNITY
Start: 2024-08-11

## 2024-08-27 SDOH — HEALTH STABILITY: PHYSICAL HEALTH: ON AVERAGE, HOW MANY DAYS PER WEEK DO YOU ENGAGE IN MODERATE TO STRENUOUS EXERCISE (LIKE A BRISK WALK)?: 0 DAYS

## 2024-08-27 ASSESSMENT — PATIENT HEALTH QUESTIONNAIRE - PHQ9
SUM OF ALL RESPONSES TO PHQ QUESTIONS 1-9: 12
SUM OF ALL RESPONSES TO PHQ QUESTIONS 1-9: 12
10. IF YOU CHECKED OFF ANY PROBLEMS, HOW DIFFICULT HAVE THESE PROBLEMS MADE IT FOR YOU TO DO YOUR WORK, TAKE CARE OF THINGS AT HOME, OR GET ALONG WITH OTHER PEOPLE: SOMEWHAT DIFFICULT

## 2024-08-27 ASSESSMENT — SOCIAL DETERMINANTS OF HEALTH (SDOH): HOW OFTEN DO YOU GET TOGETHER WITH FRIENDS OR RELATIVES?: ONCE A WEEK

## 2024-08-27 ASSESSMENT — PAIN SCALES - GENERAL: PAINLEVEL: MODERATE PAIN (4)

## 2024-08-27 NOTE — PROGRESS NOTES
Preventive Care Visit  [facility] STEFANI Mackey MD, Internal Medicine  Aug 27, 2024        Assessment and Plan  1. Encounter for Medicare annual wellness exam    Last seen patient for virtual visit in April 2024 for follow-up on morbid obesity, does have medical conditions of severe episode of recurrent depressive disorder, suicidal ideation follows psychiatry at [facility] and medications as managed by them.  Has been in inpatient psychiatric rehab at that time.      Patient was scheduled for facial herpes/ocular in June 2024 with 7 days of Valtrex 1000 mg daily, and is being treated for obesity with metformin.  She is here for annual physical.  Last lab work in February 2024 showing normal CMP, CBC, last lipid panel, TSH, B12 and vitamin D in August 2023 normal at this time.    Does have PMH of MDD, psychosis, IVY, borderline personality disorder, suicide attempts in the past.    Currently at [facility] and pt is working on getting into a newer [facility] in future.     - MA Screening Bilateral w/ Simeon; Future  - REVIEW OF HEALTH MAINTENANCE PROTOCOL ORDERS  - prazosin (MINIPRESS) 2 MG capsule; Take 1 capsule at bedtime. Disregard all previous for the medications  - Comprehensive metabolic panel (BMP + Alb, Alk Phos, ALT, AST, Total. Bili, TP); Future  - CBC with platelets; Future  - TSH with free T4 reflex; Future  - Lipid panel reflex to direct LDL Fasting; Future  - Vitamin D Deficiency; Future  - PRIMARY CARE FOLLOW-UP SCHEDULING; Future  - Hemoglobin A1c; Future  - Comprehensive metabolic panel (BMP + Alb, Alk Phos, ALT, AST, Total. Bili, TP)  - CBC with platelets  - TSH with free T4 reflex  - Lipid panel reflex to direct LDL Fasting  - Vitamin D Deficiency  - Hemoglobin A1c  - hydrOXYzine HCl (ATARAX) 50 MG tablet    2. Moderate episode of recurrent major depressive disorder (H)  3. Psychosis, unspecified psychosis type (H)  4. Suicidal behavior without attempted self-injury  5.  Generalized anxiety disorder  6. Chronic insomnia  Chronic problem, improving. Pt denies any SI/HI at this time.   Follow up with your psychiatry who is managing your current medications.   [place] [facility] [place] :   Betty Cazares, APRN, CNP   [address] [place] [address]   [phone] (Work)   [phone] (Fax)   - CBC with platelets; Future  - TSH with free T4 reflex; Future  - CBC with platelets  - TSH with free T4 reflex  - Comprehensive metabolic panel (BMP + Alb, Alk Phos, ALT, AST, Total. Bili, TP); Future  - Comprehensive metabolic panel (BMP + Alb, Alk Phos, ALT, AST, Total. Bili, TP)    7. Morbid obesity (H)  8. Other obesity  Ongoing problem, Uncontrolled. Current BMI at 47.9 which pt opting for weight management referral offered. Requesting for all the pertinent paperwork.   - TSH with free T4 reflex; Future  - Adult Comprehensive Weight Management  Referral; Future  - TSH with free T4 reflex  - Vitamin D Deficiency; Future  - Vitamin D Deficiency    9. Encounter for screening for lipid disorder  - Lipid panel reflex to direct LDL Fasting; Future  - Lipid panel reflex to direct LDL Fasting    10. Visit for screening mammogram  - MA Screening Bilateral w/ Simeon; Future    11. Encounter for vitamin deficiency screening  - Vitamin D Deficiency; Future  - Vitamin D Deficiency    12. Encounter for screening for diabetes mellitus  - Hemoglobin A1c; Future  - Hemoglobin A1c    13. Encounter for completion of form with patient  Pt is currently at [place], [place] facility with the paperwork received by me to endorse her medical conditions and medications. But pt states as below -   Currently at [facility] and working on getting into a newer [facility] in future which I emphasized to update on the move to take care of her, Pt understood and agreed with the plan.     14. Hypertriglyceridemia  New problem, will start on Fish oil capsules for improvement.  - Omega-3 Fish Oil 500 MG  capsule; Take 1 capsule (500 mg) by mouth daily.  Dispense: 90 capsule; Refill: 1      Please note that this note consists of symbols derived from keyboarding, dictation and/or voice recognition software. As a result, there may be errors in the script that have gone undetected. Please consider this when interpreting information found in this chart.    Patient Instructions   As discussed, please do fasting labs.     Follow up with your psychiatrist for medications as managed by them    Refills of your Metformin sent to pharmacy . Also placed referral to weight management for further recommendations.    Will take care of your paperwork from [facility].     =========================================================      Patient Education  Preventive Care Advice   This is general advice given by our system to help you stay healthy. However, your care team may have specific advice just for you. Please talk to your care team about your preventive care needs.  Nutrition  Eat 5 or more servings of fruits and vegetables each day.  Try wheat bread, brown rice and whole grain pasta (instead of white bread, rice, and pasta).  Get enough calcium and vitamin D. Check the label on foods and aim for 100% of the RDA (recommended daily allowance).  Lifestyle  Exercise at least 150 minutes each week  (30 minutes a day, 5 days a week).  Do muscle strengthening activities 2 days a week. These help control your weight and prevent disease.  No smoking.  Wear sunscreen to prevent skin cancer.  Have a dental exam and cleaning every 6 months.  Yearly exams  See your health care team every year to talk about:  Any changes in your health.  Any medicines your care team has prescribed.  Preventive care, family planning, and ways to prevent chronic diseases.  Shots (vaccines)   HPV shots (up to age 26), if you've never had them before.  Hepatitis B shots (up to age 59), if you've never had them before.  COVID-19 shot: Get this shot when it's  due.  Flu shot: Get a flu shot every year.  Tetanus shot: Get a tetanus shot every 10 years.  Pneumococcal, hepatitis A, and RSV shots: Ask your care team if you need these based on your risk.  Shingles shot (for age 50 and up)  General health tests  Diabetes screening:  Starting at age 35, Get screened for diabetes at least every 3 years.  If you are younger than age 35, ask your care team if you should be screened for diabetes.  Cholesterol test: At age 39, start having a cholesterol test every 5 years, or more often if advised.  Bone density scan (DEXA): At age 50, ask your care team if you should have this scan for osteoporosis (brittle bones).  Hepatitis C: Get tested at least once in your life.  STIs (sexually transmitted infections)  Before age 24: Ask your care team if you should be screened for STIs.  After age 24: Get screened for STIs if you're at risk. You are at risk for STIs (including HIV) if:  You are sexually active with more than one person.  You don't use condoms every time.  You or a partner was diagnosed with a sexually transmitted infection.  If you are at risk for HIV, ask about PrEP medicine to prevent HIV.  Get tested for HIV at least once in your life, whether you are at risk for HIV or not.  Cancer screening tests  Cervical cancer screening: If you have a cervix, begin getting regular cervical cancer screening tests starting at age 21.  Breast cancer scan (mammogram): If you've ever had breasts, begin having regular mammograms starting at age 40. This is a scan to check for breast cancer.  Colon cancer screening: It is important to start screening for colon cancer at age 45.  Have a colonoscopy test every 10 years (or more often if you're at risk) Or, ask your provider about stool tests like a FIT test every year or Cologuard test every 3 years.  To learn more about your testing options, visit:   .  For help making a decision, visit:   https://bit.ly/dc24413.  Prostate cancer screening  test: If you have a prostate, ask your care team if a prostate cancer screening test (PSA) at age 55 is right for you.  Lung cancer screening: If you are a current or former smoker ages 50 to 80, ask your care team if ongoing lung cancer screenings are right for you.  For informational purposes only. Not to replace the advice of your health care provider. Copyright   2023 [facility]. All rights reserved. Clinically reviewed by the [facility]. [employer] 323720 - REV 01/24.  Learning About Sleeping Well  What does sleeping well mean?     Sleeping well means getting enough sleep to feel good and stay healthy. How much sleep is enough varies among people.  The number of hours you sleep and how you feel when you wake up are both important. If you do not feel refreshed, you probably need more sleep. Another sign of not getting enough sleep is feeling tired during the day.  Experts recommend that adults get at least 7 or more hours of sleep per day. Children and older adults need more sleep.  Why is getting enough sleep important?  Getting enough quality sleep is a basic part of good health. When your sleep suffers, your physical health, mood, and your thoughts can suffer too. You may find yourself feeling more grumpy or stressed. Not getting enough sleep also can lead to serious problems, including injury, accidents, anxiety, and depression.  What might cause poor sleeping?  Many things can cause sleep problems, including:  Changes to your sleep schedule.  Stress. Stress can be caused by fear about a single event, such as giving a speech. Or you may have ongoing stress, such as worry about work or school.  Depression, anxiety, and other mental or emotional conditions.  Changes in your sleep habits or surroundings. This includes changes that happen where you sleep, such as noise, light, or sleeping in a different bed. It also includes changes in your sleep pattern, such as having  "jet lag or working a late shift.  Health problems, such as pain, breathing problems, and restless legs syndrome.  Lack of regular exercise.  Using alcohol, nicotine, or caffeine before bed.  How can you help yourself?  Here are some tips that may help you sleep more soundly and wake up feeling more refreshed.  Your sleeping area   Use your bedroom only for sleeping and sex. A bit of light reading may help you fall asleep. But if it doesn't, do your reading elsewhere in the house. Try not to use your TV, computer, smartphone, or tablet while you are in bed.  Be sure your bed is big enough to stretch out comfortably, especially if you have a sleep partner.  Keep your bedroom quiet, dark, and cool. Use curtains, blinds, or a sleep mask to block out light. To block out noise, use earplugs, soothing music, or a \"white noise\" machine.  Your evening and bedtime routine   Create a relaxing bedtime routine. You might want to take a warm shower or bath, or listen to soothing music.  Go to bed at the same time every night. And get up at the same time every morning, even if you feel tired.  What to avoid   Limit caffeine (coffee, tea, caffeinated sodas) during the day, and don't have any for at least 6 hours before bedtime.  Avoid drinking alcohol before bedtime. Alcohol can cause you to wake up more often during the night.  Try not to smoke or use tobacco, especially in the evening. Nicotine can keep you awake.  Limit naps during the day, especially close to bedtime.  Avoid lying in bed awake for too long. If you can't fall asleep or if you wake up in the middle of the night and can't get back to sleep within about 20 minutes, get out of bed and go to another room until you feel sleepy.  Avoid taking medicine right before bed that may keep you awake or make you feel hyper or energized. Your doctor can tell you if your medicine may do this and if you can take it earlier in the day.  If you can't sleep   Imagine yourself in a " "peaceful, pleasant scene. Focus on the details and feelings of being in a place that is relaxing.  Get up and do a quiet or boring activity until you feel sleepy.  Avoid drinking any liquids before going to bed to help prevent waking up often to use the bathroom.  Where can you learn more?  Go to https://www.[employer].net/patiented  Enter J942 in the search box to learn more about \"Learning About Sleeping Well.\"  Current as of: July 10, 2023  Content Version: 14.1 2006-2024 [employer].   Care instructions adapted under license by your healthcare professional. If you have questions about a medical condition or this instruction, always ask your healthcare professional. [employer] disclaims any warranty or liability for your use of this information.    Learning About Depression Screening  What is depression screening?  Depression screening is a way to see if you have depression symptoms. It may be done by a doctor or counselor. It's often part of a routine checkup. That's because your mental health is just as important as your physical health.  Depression is a mental health condition that affects how you feel, think, and act. You may:  Have less energy.  Lose interest in your daily activities.  Feel sad and grouchy for a long time.  Depression is very common. It affects people of all ages.  Many things can lead to depression. Some people become depressed after they have a stroke or find out they have a major illness like cancer or heart disease. The death of a loved one or a breakup may lead to depression. It can run in families. Most experts believe that a combination of inherited genes and stressful life events can cause it.  What happens during screening?  You may be asked to fill out a form about your depression symptoms. You and the doctor will discuss your answers. The doctor may ask you more questions to learn more about how you think, act, and feel.  What happens after " "screening?  If you have symptoms of depression, your doctor will talk to you about your options.  Doctors usually treat depression with medicines or counseling. Often, combining the two works best. Many people don't get help because they think that they'll get over the depression on their own. But people with depression may not get better unless they get treatment.  The cause of depression is not well understood. There may be many factors involved. But if you have depression, it's not your fault.  A serious symptom of depression is thinking about death or suicide. If you or someone you care about talks about this or about feeling hopeless, get help right away.  It's important to know that depression can be treated. Medicine, counseling, and self-care may help.  Where can you learn more?  Go to https://www.[employer].net/patiented  Enter T185 in the search box to learn more about \"Learning About Depression Screening.\"  Current as of: June 24, 2023  Content Version: 14.1 2006-2024 [employer].   Care instructions adapted under license by your healthcare professional. If you have questions about a medical condition or this instruction, always ask your healthcare professional. [employer] disclaims any warranty or liability for your use of this information.    Chronic Pain: Care Instructions  Your Care Instructions     Chronic pain is pain that lasts a long time (months or even years) and may or may not have a clear cause. It is different from acute pain, which usually does have a clear cause--like an injury or illness--and gets better over time. Chronic pain:  Lasts over time but may vary from day to day.  Does not go away despite efforts to end it.  May disrupt your sleep and lead to fatigue.  May cause depression or anxiety.  May make your muscles tense, causing more pain.  Can disrupt your work, hobbies, home life, and relationships with friends and family.  Chronic pain is a very real " condition. It is not just in your head. Treatment can help and usually includes several methods used together, such as medicines, physical therapy, exercise, and other treatments. Learning how to relax and changing negative thought patterns can also help you cope.  Chronic pain is complex. Taking an active role in your treatment will help you better manage your pain. Tell your doctor if you have trouble dealing with your pain. You may have to try several things before you find what works best for you.  Follow-up care is a key part of your treatment and safety. Be sure to make and go to all appointments, and call your doctor if you are having problems. It's also a good idea to know your test results and keep a list of the medicines you take.  How can you care for yourself at home?  Pace yourself. Break up large jobs into smaller tasks. Save harder tasks for days when you have less pain, or go back and forth between hard tasks and easier ones. Take rest breaks.  Relax, and reduce stress. Relaxation techniques such as deep breathing or meditation can help.  Keep moving. Gentle, daily exercise can help reduce pain over the long run. Try low- or no-impact exercises such as walking, swimming, and stationary biking. Do stretches to stay flexible.  Try heat, cold packs, and massage.  Get enough sleep. Chronic pain can make you tired and drain your energy. Talk with your doctor if you have trouble sleeping because of pain.  Think positive. Your thoughts can affect your pain level. Do things that you enjoy to distract yourself when you have pain instead of focusing on the pain. See a movie, read a book, listen to music, or spend time with a friend.  If you think you are depressed, talk to your doctor about treatment.  Keep a daily pain diary. Record how your moods, thoughts, sleep patterns, activities, and medicine affect your pain. You may find that your pain is worse during or after certain activities or when you are  "feeling a certain emotion. Having a record of your pain can help you and your doctor find the best ways to treat your pain.  Take pain medicines exactly as directed.  If the doctor gave you a prescription medicine for pain, take it as prescribed.  If you are not taking a prescription pain medicine, ask your doctor if you can take an over-the-counter medicine.  Reducing constipation caused by pain medicine  Talk to your doctor about a laxative. If a laxative doesn't work, your doctor may suggest a prescription medicine.  Include fruits, vegetables, beans, and whole grains in your diet each day. These foods are high in fiber.  If your doctor recommends it, get more exercise. Walking is a good choice. Bit by bit, increase the amount you walk every day. Try for at least 30 minutes on most days of the week.  Schedule time each day for a bowel movement. A daily routine may help. Take your time and do not strain when having a bowel movement.  When should you call for help?   Call your doctor now or seek immediate medical care if:    Your pain gets worse or is out of control.     You feel down or blue, or you do not enjoy things like you once did. You may be depressed, which is common in people with chronic pain. Depression can be treated.     You have vomiting or cramps for more than 2 hours.   Watch closely for changes in your health, and be sure to contact your doctor if:    You cannot sleep because of pain.     You are very worried or anxious about your pain.     You have trouble taking your pain medicine.     You have any concerns about your pain medicine.     You have trouble with bowel movements, such as:  No bowel movement in 3 days.  Blood in the anal area, in your stool, or on the toilet paper.  Diarrhea for more than 24 hours.   Where can you learn more?  Go to https://www.healthwise.net/patiented  Enter N004 in the search box to learn more about \"Chronic Pain: Care Instructions.\"  Current as of: July 10, 2023    "            Content Version: 14.0    6137-5226 [employer].   Care instructions adapted under license by your healthcare professional. If you have questions about a medical condition or this instruction, always ask your healthcare professional. [employer] disclaims any warranty or liability for your use of this information.         Return in about 6 months (around 2/27/2025), or if symptoms worsen or fail to improve, for depression, Follow up of last visit, If symptoms persist, video visit, Obesity.    Jelena Mackey MD  [facility] LLCCLARISSA Jay is a 39 year old, presenting for the following:  Wellness Visit        8/27/2024    11:21 AM   Additional Questions   Roomed by [facility]         Health Care Directive  Patient does not have a Health Care Directive or Living Will: N/A    HPI        8/27/2024   General Health   How would you rate your overall physical health? Good   Feel stress (tense, anxious, or unable to sleep) Only a little      (!) STRESS CONCERN      8/27/2024   Nutrition   Diet: I don't know            8/27/2024   Exercise   Days per week of moderate/strenous exercise 0 days      (!) EXERCISE CONCERN      8/27/2024   Social Factors   Frequency of gathering with friends or relatives Once a week   Worry food won't last until get money to buy more No   Food not last or not have enough money for food? No   Do you have housing? (Housing is defined as stable permanent housing and does not include staying ouside in a car, in a tent, in an abandoned building, in an overnight shelter, or couch-surfing.) Yes   Are you worried about losing your housing? No   Lack of transportation? No   Unable to get utilities (heat,electricity)? No            8/27/2024   Fall Risk   Fallen 2 or more times in the past year? No   Trouble with walking or balance? No             8/27/2024   Activities of Daily Living- Home Safety   Needs help with the following daily  activites None of the above   Safety concerns in the home None of the above            8/27/2024   Dental   Dentist two times every year? (!) NO            8/27/2024   Hearing Screening   Hearing concerns? None of the above            8/27/2024   Driving Risk Screening   Patient/family members have concerns about driving No            8/27/2024   General Alertness/Fatigue Screening   Have you been more tired than usual lately? (!) YES            8/27/2024   Urinary Incontinence Screening   Bothered by leaking urine in past 6 months No            8/27/2024   TB Screening   Were you born outside of the [place]? No          Today's PHQ-9 Score:       8/27/2024    10:51 AM   PHQ-9 SCORE   PHQ-9 Total Score MyChart 12 (Moderate depression)   PHQ-9 Total Score 12         8/27/2024   Substance Use   Alcohol more than 3/day or more than 7/wk Not Applicable   Do you have a current opioid prescription? (!) YES   How severe/bad is pain from 1 to 10? 3/10   Do you use any other substances recreationally? No      Social History     Tobacco Use    Smoking status: Never     Passive exposure: Current    Smokeless tobacco: Never   Vaping Use    Vaping status: Never Used   Substance Use Topics    Alcohol use: Not Currently    Drug use: Never           8/23/2023   LAST FHS-7 RESULTS   1st degree relative breast or ovarian cancer No   Any relative bilateral breast cancer No   Any male have breast cancer Yes   Any ONE woman have BOTH breast AND ovarian cancer No   Any woman with breast cancer before 50yrs No   2 or more relatives with breast AND/OR ovarian cancer No   2 or more relatives with breast AND/OR bowel cancer No           Mammogram Screening - Annual screen due to greater than 20% lifetime risk as estimated by Breast Cancer Risk Calculator      History of abnormal Pap smear: No - age 30- 64 PAP with HPV every 5 years recommended        Latest Ref Rng & Units 9/1/2021     3:54 PM   PAP / HPV   PAP  Negative for Intraepithelial  Lesion or Malignancy (NILM)    HPV 16 DNA Negative Negative    HPV 18 DNA Negative Negative    Other HR HPV Negative Negative      ASCVD Risk   The 10-year ASCVD risk score (Corinna VERNON, et al., 2019) is: 0.7%    Values used to calculate the score:      Age: 39 years      Sex: Female      Is Non- : No      Diabetic: No      Tobacco smoker: No      Systolic Blood Pressure: 108 mmHg      Is BP treated: No      HDL Cholesterol: 33 mg/dL      Total Cholesterol: 150 mg/dL        8/27/2024   Contraception/Family Planning   Questions about contraception or family planning No            Reviewed and updated as needed this visit by Provider   Tobacco  Allergies  Meds  Problems  Med Hx  Surg Hx  Fam Hx            Past Medical History:   Diagnosis Date    Depressive disorder     Generalized anxiety disorder 5/15/2020     Past Surgical History:   Procedure Laterality Date    CHOLECYSTECTOMY       OB History   No obstetric history on file.     Lab work is in process  Labs reviewed in EPIC  BP Readings from Last 3 Encounters:   08/27/24 108/72   02/15/24 (!) 167/92   12/14/23 128/76    Wt Readings from Last 3 Encounters:   08/27/24 134.7 kg (297 lb)   06/17/24 137.9 kg (304 lb)   02/15/24 135.9 kg (299 lb 8 oz)                  Patient Active Problem List   Diagnosis    Depression    Suicidal ideation    Depression with suicidal ideation    Major depression    Suicidal behavior    Facial cellulitis    Auditory hallucination    Psychosis, unspecified psychosis type (H)    Morbid obesity (H)    Chronic insomnia    Generalized anxiety disorder    COVID-19     Past Surgical History:   Procedure Laterality Date    CHOLECYSTECTOMY         Social History     Tobacco Use    Smoking status: Never     Passive exposure: Current    Smokeless tobacco: Never   Substance Use Topics    Alcohol use: Not Currently     Family History   Problem Relation Age of Onset    Diabetes Father          Current Outpatient  Medications   Medication Sig Dispense Refill    clonazePAM (KLONOPIN) 1 MG tablet Take 1 tablet by mouth Twice daily. Discontinue/stop taking Lorazepam      divalproex sodium extended-release (DEPAKOTE ER) 500 MG 24 hr tablet Take 2 tablets (1,000 mg) by mouth at bedtime 60 tablet 0    docusate sodium (COLACE) 100 MG capsule Take 1 capsule (100 mg) by mouth 2 times daily 60 capsule 0    hydrOXYzine HCl (ATARAX) 50 MG tablet       metFORMIN (GLUCOPHAGE) 1000 MG tablet Take 1 tablet (1,000 mg) by mouth 2 times daily (with meals) 180 tablet 1    miconazole (MICATIN) 2 % external powder Apply topically 2 times daily to rash under breasts 85 g 0    OLANZapine (ZYPREXA) 10 MG tablet       OLANZapine (ZYPREXA) 5 MG tablet       prazosin (MINIPRESS) 2 MG capsule Take 1 capsule at bedtime. Disregard all previous for the medications      traZODone (DESYREL) 150 MG tablet       traZODone (DESYREL) 50 MG tablet Take 1 tablet (50 mg) by mouth nightly as needed for sleep (Patient taking differently: Take 50 mg by mouth at bedtime.) 30 tablet 0    polyethylene glycol (MIRALAX) 17 GM/Dose powder Take 1 Capful by mouth daily (Patient not taking: Reported on 3/13/2024)      valACYclovir (VALTREX) 1000 mg tablet Take 1 tablet (1,000 mg) by mouth 3 times daily for 7 days 21 tablet 0     No Known Allergies  Recent Labs   Lab Test 08/27/24  1155 02/26/24  2027 02/15/24  0814 02/06/24  1000 02/04/24  2326 02/03/24  1413 12/14/23  1040 08/23/23  1155 08/15/22  1131 08/15/22  1131 05/23/21  0714 04/09/21  0724   A1C 5.1  --   --   --   --   --   --   --   --  5.2  --   --    LDL  --   --   --   --   --   --   --  89  --  103* 106*  --    HDL  --   --   --   --   --   --   --  33*  --  42* 40*  --    TRIG  --   --   --   --   --   --   --  142  --  179* 81  --    ALT  --  30  --   --  24 39 59* 42   < > 22 18  --    CR  --  0.83 0.85   < > 0.74 0.90 0.87 0.99*   < > 0.89 0.84 0.64   GFRESTIMATED  --  >90 88   < > >90 82 86 74   < > 84 89 >90  "  GFRESTBLACK  --   --   --   --   --   --   --   --   --   --  >90 >90   POTASSIUM  --  4.1  --   --  3.6 3.8 4.2 5.0   < > 4.4 3.8 4.0   TSH  --   --   --   --   --   --  1.54 1.90  --   --  1.19  --     < > = values in this interval not displayed.      Current providers sharing in care for this patient include:  Patient Care Team:  [facility] Cinthya Bonner as PCP - General  Jose Juan Aquino [facility] as Pharmacist (Pharmacist)  Jelena Mackey MD as Assigned PCP  Dinesh Renee OD as Assigned Surgical Provider    The following health maintenance items are reviewed in Epic and correct as of today:  Health Maintenance   Topic Date Due    MAMMO SCREENING  Never done    HEPATITIS B IMMUNIZATION (2 of 2 - CpG 2-dose series) 10/05/2022    COVID-19 Vaccine (5 - 2023-24 season) 09/01/2023    INFLUENZA VACCINE (1) 09/01/2024    DEPRESSION 12 MO INDEX REPEAT PHQ-9  09/10/2024    DTAP/TDAP/TD IMMUNIZATION (2 - Td or Tdap) 04/24/2025    MEDICARE ANNUAL WELLNESS VISIT  08/27/2025    ANNUAL REVIEW OF HM ORDERS  08/27/2025    HPV TEST  09/01/2026    PAP  09/01/2026    GLUCOSE  02/26/2027    ADVANCE CARE PLANNING  04/11/2027    LIPID  08/23/2028    HEPATITIS C SCREENING  Completed    HIV SCREENING  Completed    Pneumococcal Vaccine: Pediatrics (0 to 5 Years) and At-Risk Patients (6 to 64 Years)  Aged Out    HPV IMMUNIZATION  Aged Out    MENINGITIS IMMUNIZATION  Aged Out    RSV MONOCLONAL ANTIBODY  Aged Out         Review of Systems  Constitutional, HEENT, cardiovascular, pulmonary, GI, , musculoskeletal, neuro, skin, endocrine and psych systems are negative, except as otherwise noted.     Objective    Exam  /72   Pulse 97   Temp 97.4  F (36.3  C) (Temporal)   Resp 14   Ht 1.676 m (5' 6\")   Wt 134.7 kg (297 lb)   LMP 08/01/2024 (Approximate)   SpO2 97%   BMI 47.94 kg/m     Estimated body mass index is 47.94 kg/m  as calculated from the following:    Height as of this encounter: 1.676 m (5' 6\").    Weight as " of this encounter: 134.7 kg (297 lb).    Physical Exam  GENERAL: alert and no distress  EYES: Eyes grossly normal to inspection, PERRL and conjunctivae and sclerae normal  HENT: ear canals and TM's normal, nose and mouth without ulcers or lesions  NECK: no adenopathy, no asymmetry, masses, or scars  RESP: lungs clear to auscultation - no rales, rhonchi or wheezes  BREAST: normal without masses, tenderness or nipple discharge and no palpable axillary masses or adenopathy  CV: regular rate and rhythm, normal S1 S2, no S3 or S4, no murmur, click or rub, no peripheral edema  ABDOMEN: soft, nontender, no hepatosplenomegaly, no masses and bowel sounds normal  MS: no gross musculoskeletal defects noted, no edema  SKIN: no suspicious lesions or rashes  NEURO: Normal strength and tone, mentation intact and speech normal  PSYCH: mentation appears normal, affect normal/bright        8/27/2024   Mini Cog   Clock Draw Score 2 Normal   3 Item Recall 3 objects recalled   Mini Cog Total Score 5                 Signed Electronically by: Jelena Mackey MD

## 2024-08-27 NOTE — TELEPHONE ENCOUNTER
Forms signed and placed in my out box.  Please scan in patient's chart before doing the needful. Will need progress notes to be attached.     Thank you,  Jelena Mackey MD on 8/27/2024 at 1:00 PM

## 2024-08-27 NOTE — PATIENT INSTRUCTIONS
As discussed, please do fasting labs.     Follow up with your psychiatrist for medications as managed by them    Refills of your Metformin sent to pharmacy . Also placed referral to weight management for further recommendations.    Will take care of your paperwork from [facility].     =========================================================      Patient Education   Preventive Care Advice   This is general advice given by our system to help you stay healthy. However, your care team may have specific advice just for you. Please talk to your care team about your preventive care needs.  Nutrition  Eat 5 or more servings of fruits and vegetables each day.  Try wheat bread, brown rice and whole grain pasta (instead of white bread, rice, and pasta).  Get enough calcium and vitamin D. Check the label on foods and aim for 100% of the RDA (recommended daily allowance).  Lifestyle  Exercise at least 150 minutes each week  (30 minutes a day, 5 days a week).  Do muscle strengthening activities 2 days a week. These help control your weight and prevent disease.  No smoking.  Wear sunscreen to prevent skin cancer.  Have a dental exam and cleaning every 6 months.  Yearly exams  See your health care team every year to talk about:  Any changes in your health.  Any medicines your care team has prescribed.  Preventive care, family planning, and ways to prevent chronic diseases.  Shots (vaccines)   HPV shots (up to age 26), if you've never had them before.  Hepatitis B shots (up to age 59), if you've never had them before.  COVID-19 shot: Get this shot when it's due.  Flu shot: Get a flu shot every year.  Tetanus shot: Get a tetanus shot every 10 years.  Pneumococcal, hepatitis A, and RSV shots: Ask your care team if you need these based on your risk.  Shingles shot (for age 50 and up)  General health tests  Diabetes screening:  Starting at age 35, Get screened for diabetes at least every 3 years.  If you are younger than age 35, ask  your care team if you should be screened for diabetes.  Cholesterol test: At age 39, start having a cholesterol test every 5 years, or more often if advised.  Bone density scan (DEXA): At age 50, ask your care team if you should have this scan for osteoporosis (brittle bones).  Hepatitis C: Get tested at least once in your life.  STIs (sexually transmitted infections)  Before age 24: Ask your care team if you should be screened for STIs.  After age 24: Get screened for STIs if you're at risk. You are at risk for STIs (including HIV) if:  You are sexually active with more than one person.  You don't use condoms every time.  You or a partner was diagnosed with a sexually transmitted infection.  If you are at risk for HIV, ask about PrEP medicine to prevent HIV.  Get tested for HIV at least once in your life, whether you are at risk for HIV or not.  Cancer screening tests  Cervical cancer screening: If you have a cervix, begin getting regular cervical cancer screening tests starting at age 21.  Breast cancer scan (mammogram): If you've ever had breasts, begin having regular mammograms starting at age 40. This is a scan to check for breast cancer.  Colon cancer screening: It is important to start screening for colon cancer at age 45.  Have a colonoscopy test every 10 years (or more often if you're at risk) Or, ask your provider about stool tests like a FIT test every year or Cologuard test every 3 years.  To learn more about your testing options, visit:   .  For help making a decision, visit:   https://bit.ly/cv92511.  Prostate cancer screening test: If you have a prostate, ask your care team if a prostate cancer screening test (PSA) at age 55 is right for you.  Lung cancer screening: If you are a current or former smoker ages 50 to 80, ask your care team if ongoing lung cancer screenings are right for you.  For informational purposes only. Not to replace the advice of your health care provider. Copyright   2023  [facility]. All rights reserved. Clinically reviewed by the [facility]. [employer] 138818 - REV 01/24.  Learning About Sleeping Well  What does sleeping well mean?     Sleeping well means getting enough sleep to feel good and stay healthy. How much sleep is enough varies among people.  The number of hours you sleep and how you feel when you wake up are both important. If you do not feel refreshed, you probably need more sleep. Another sign of not getting enough sleep is feeling tired during the day.  Experts recommend that adults get at least 7 or more hours of sleep per day. Children and older adults need more sleep.  Why is getting enough sleep important?  Getting enough quality sleep is a basic part of good health. When your sleep suffers, your physical health, mood, and your thoughts can suffer too. You may find yourself feeling more grumpy or stressed. Not getting enough sleep also can lead to serious problems, including injury, accidents, anxiety, and depression.  What might cause poor sleeping?  Many things can cause sleep problems, including:  Changes to your sleep schedule.  Stress. Stress can be caused by fear about a single event, such as giving a speech. Or you may have ongoing stress, such as worry about work or school.  Depression, anxiety, and other mental or emotional conditions.  Changes in your sleep habits or surroundings. This includes changes that happen where you sleep, such as noise, light, or sleeping in a different bed. It also includes changes in your sleep pattern, such as having jet lag or working a late shift.  Health problems, such as pain, breathing problems, and restless legs syndrome.  Lack of regular exercise.  Using alcohol, nicotine, or caffeine before bed.  How can you help yourself?  Here are some tips that may help you sleep more soundly and wake up feeling more refreshed.  Your sleeping area   Use your bedroom only for sleeping and sex.  "A bit of light reading may help you fall asleep. But if it doesn't, do your reading elsewhere in the house. Try not to use your TV, computer, smartphone, or tablet while you are in bed.  Be sure your bed is big enough to stretch out comfortably, especially if you have a sleep partner.  Keep your bedroom quiet, dark, and cool. Use curtains, blinds, or a sleep mask to block out light. To block out noise, use earplugs, soothing music, or a \"white noise\" machine.  Your evening and bedtime routine   Create a relaxing bedtime routine. You might want to take a warm shower or bath, or listen to soothing music.  Go to bed at the same time every night. And get up at the same time every morning, even if you feel tired.  What to avoid   Limit caffeine (coffee, tea, caffeinated sodas) during the day, and don't have any for at least 6 hours before bedtime.  Avoid drinking alcohol before bedtime. Alcohol can cause you to wake up more often during the night.  Try not to smoke or use tobacco, especially in the evening. Nicotine can keep you awake.  Limit naps during the day, especially close to bedtime.  Avoid lying in bed awake for too long. If you can't fall asleep or if you wake up in the middle of the night and can't get back to sleep within about 20 minutes, get out of bed and go to another room until you feel sleepy.  Avoid taking medicine right before bed that may keep you awake or make you feel hyper or energized. Your doctor can tell you if your medicine may do this and if you can take it earlier in the day.  If you can't sleep   Imagine yourself in a peaceful, pleasant scene. Focus on the details and feelings of being in a place that is relaxing.  Get up and do a quiet or boring activity until you feel sleepy.  Avoid drinking any liquids before going to bed to help prevent waking up often to use the bathroom.  Where can you learn more?  Go to https://www.[employer].net/patiented  Enter J942 in the search box to learn more " "about \"Learning About Sleeping Well.\"  Current as of: July 10, 2023  Content Version: 14.1 2006-2024 [employer].   Care instructions adapted under license by your healthcare professional. If you have questions about a medical condition or this instruction, always ask your healthcare professional. [employer] disclaims any warranty or liability for your use of this information.    Learning About Depression Screening  What is depression screening?  Depression screening is a way to see if you have depression symptoms. It may be done by a doctor or counselor. It's often part of a routine checkup. That's because your mental health is just as important as your physical health.  Depression is a mental health condition that affects how you feel, think, and act. You may:  Have less energy.  Lose interest in your daily activities.  Feel sad and grouchy for a long time.  Depression is very common. It affects people of all ages.  Many things can lead to depression. Some people become depressed after they have a stroke or find out they have a major illness like cancer or heart disease. The death of a loved one or a breakup may lead to depression. It can run in families. Most experts believe that a combination of inherited genes and stressful life events can cause it.  What happens during screening?  You may be asked to fill out a form about your depression symptoms. You and the doctor will discuss your answers. The doctor may ask you more questions to learn more about how you think, act, and feel.  What happens after screening?  If you have symptoms of depression, your doctor will talk to you about your options.  Doctors usually treat depression with medicines or counseling. Often, combining the two works best. Many people don't get help because they think that they'll get over the depression on their own. But people with depression may not get better unless they get treatment.  The cause of " "depression is not well understood. There may be many factors involved. But if you have depression, it's not your fault.  A serious symptom of depression is thinking about death or suicide. If you or someone you care about talks about this or about feeling hopeless, get help right away.  It's important to know that depression can be treated. Medicine, counseling, and self-care may help.  Where can you learn more?  Go to https://www.[employer].net/patiented  Enter T185 in the search box to learn more about \"Learning About Depression Screening.\"  Current as of: June 24, 2023  Content Version: 14.1 2006-2024 [employer].   Care instructions adapted under license by your healthcare professional. If you have questions about a medical condition or this instruction, always ask your healthcare professional. [employer] disclaims any warranty or liability for your use of this information.    Chronic Pain: Care Instructions  Your Care Instructions     Chronic pain is pain that lasts a long time (months or even years) and may or may not have a clear cause. It is different from acute pain, which usually does have a clear cause--like an injury or illness--and gets better over time. Chronic pain:  Lasts over time but may vary from day to day.  Does not go away despite efforts to end it.  May disrupt your sleep and lead to fatigue.  May cause depression or anxiety.  May make your muscles tense, causing more pain.  Can disrupt your work, hobbies, home life, and relationships with friends and family.  Chronic pain is a very real condition. It is not just in your head. Treatment can help and usually includes several methods used together, such as medicines, physical therapy, exercise, and other treatments. Learning how to relax and changing negative thought patterns can also help you cope.  Chronic pain is complex. Taking an active role in your treatment will help you better manage your pain. Tell your " doctor if you have trouble dealing with your pain. You may have to try several things before you find what works best for you.  Follow-up care is a key part of your treatment and safety. Be sure to make and go to all appointments, and call your doctor if you are having problems. It's also a good idea to know your test results and keep a list of the medicines you take.  How can you care for yourself at home?  Pace yourself. Break up large jobs into smaller tasks. Save harder tasks for days when you have less pain, or go back and forth between hard tasks and easier ones. Take rest breaks.  Relax, and reduce stress. Relaxation techniques such as deep breathing or meditation can help.  Keep moving. Gentle, daily exercise can help reduce pain over the long run. Try low- or no-impact exercises such as walking, swimming, and stationary biking. Do stretches to stay flexible.  Try heat, cold packs, and massage.  Get enough sleep. Chronic pain can make you tired and drain your energy. Talk with your doctor if you have trouble sleeping because of pain.  Think positive. Your thoughts can affect your pain level. Do things that you enjoy to distract yourself when you have pain instead of focusing on the pain. See a movie, read a book, listen to music, or spend time with a friend.  If you think you are depressed, talk to your doctor about treatment.  Keep a daily pain diary. Record how your moods, thoughts, sleep patterns, activities, and medicine affect your pain. You may find that your pain is worse during or after certain activities or when you are feeling a certain emotion. Having a record of your pain can help you and your doctor find the best ways to treat your pain.  Take pain medicines exactly as directed.  If the doctor gave you a prescription medicine for pain, take it as prescribed.  If you are not taking a prescription pain medicine, ask your doctor if you can take an over-the-counter medicine.  Reducing constipation  "caused by pain medicine  Talk to your doctor about a laxative. If a laxative doesn't work, your doctor may suggest a prescription medicine.  Include fruits, vegetables, beans, and whole grains in your diet each day. These foods are high in fiber.  If your doctor recommends it, get more exercise. Walking is a good choice. Bit by bit, increase the amount you walk every day. Try for at least 30 minutes on most days of the week.  Schedule time each day for a bowel movement. A daily routine may help. Take your time and do not strain when having a bowel movement.  When should you call for help?   Call your doctor now or seek immediate medical care if:    Your pain gets worse or is out of control.     You feel down or blue, or you do not enjoy things like you once did. You may be depressed, which is common in people with chronic pain. Depression can be treated.     You have vomiting or cramps for more than 2 hours.   Watch closely for changes in your health, and be sure to contact your doctor if:    You cannot sleep because of pain.     You are very worried or anxious about your pain.     You have trouble taking your pain medicine.     You have any concerns about your pain medicine.     You have trouble with bowel movements, such as:  No bowel movement in 3 days.  Blood in the anal area, in your stool, or on the toilet paper.  Diarrhea for more than 24 hours.   Where can you learn more?  Go to https://www.[employer].net/patiented  Enter N004 in the search box to learn more about \"Chronic Pain: Care Instructions.\"  Current as of: July 10, 2023               Content Version: 14.0    [id number] [employer].   Care instructions adapted under license by your healthcare professional. If you have questions about a medical condition or this instruction, always ask your healthcare professional. [employer] disclaims any warranty or liability for your use of this information.         "

## 2024-08-28 RX ORDER — METAPROTERENOL SULFATE 10 MG
500 TABLET ORAL DAILY
Qty: 90 CAPSULE | Refills: 1 | Status: SHIPPED | OUTPATIENT
Start: 2024-08-28

## 2024-08-28 NOTE — TELEPHONE ENCOUNTER
Picked up completed forms,Scanned to medical records and filed in team 3 drawer.  Attached progress notes

## 2024-08-28 NOTE — RESULT ENCOUNTER NOTE
Your Cholesterol is mildly high. Given your age and no cardiac risk factors , recommend dietery management of avoiding high fat foods and red meat . Will start on fish oil capsules for triglycerides .  Life style measures on weight reduction recommended.     All your OTHER labs normal, you may see some highlighted which do not have Clinical significance.    Please let me know if you have any questions.  Jelena Mackey MD on 8/28/2024   [facility]

## 2024-08-28 NOTE — TELEPHONE ENCOUNTER
Spoke with patient, she is going to speak with her  about were to fax the forms, I have placed the forms in Team 3 under outgoing to providers.   Once we find out I will fax them.

## 2024-09-03 NOTE — TELEPHONE ENCOUNTER
Called patient, patient gave me Marleny's ( 's phone number) Called her and she is out, Called another number Marianna Supervisor [phone] and left a voicemail asking for a fax number to send these too

## 2024-09-09 DIAGNOSIS — E66.01 MORBID OBESITY (H): ICD-10-CM

## 2024-09-09 NOTE — TELEPHONE ENCOUNTER
[facility] calling to follow up on Rx Metformin 1000 mg request from last Tuesday 9/3/24.     OV notes from 8/27/24:   Refills of your Metformin sent to pharmacy .     Per Epic no Rx refill entered of sent.   Please review and complete.       Rx Metformin refill request pended.       Kelley MONTEIRO, RN      September 9, 2024  9:17 AM

## 2024-11-09 ASSESSMENT — PATIENT HEALTH QUESTIONNAIRE - PHQ9: SUM OF ALL RESPONSES TO PHQ QUESTIONS 1-9: 4

## 2024-11-11 ENCOUNTER — IMMUNIZATION (OUTPATIENT)
Dept: FAMILY MEDICINE | Facility: CLINIC | Age: 39
End: 2024-11-11
Payer: MEDICARE

## 2024-11-11 PROCEDURE — 90656 IIV3 VACC NO PRSV 0.5 ML IM: CPT

## 2024-11-11 PROCEDURE — G0008 ADMIN INFLUENZA VIRUS VAC: HCPCS

## 2024-11-25 ENCOUNTER — TRANSFERRED RECORDS (OUTPATIENT)
Dept: HEALTH INFORMATION MANAGEMENT | Facility: CLINIC | Age: 39
End: 2024-11-25
Payer: MEDICARE

## 2024-11-25 NOTE — PROGRESS NOTES
[address] [address]  Phone: [phone]  Fax: [phone]  Primary Provider: Alaina De La Cruz  Pre-op Performing Provider: DONALDO MANSFIELD    PREOPERATIVE EVALUATION:  Today's date: 4/11/2022    Misty Parham is a 36 year old female who presents for a preoperative evaluation.    Surgical Information:  Surgery/Procedure: ECT  Surgery Location: [facility] [place]  Surgeon: Dr. Garner  Surgery Date: Monday and Fridays every week until further notice  Time of Surgery: unknown  Where patient plans to recover: At home with Home Care  Fax number for surgical facility: not faxed per pt and forms from [facility] [place]    Type of Anesthesia Anticipated: General    Assessment & Plan     The proposed surgical procedure is considered LOW risk.    Depression with suicidal ideation  Major depressive disorder, single episode, severe with psychotic features (H)        Risks and Recommendations:  The patient has the following additional risks and recommendations for perioperative complications:   - Morbid obesity (BMI >40)    Medication Instructions:  Patient is to take all scheduled medications on the day of surgery    RECOMMENDATION:  APPROVAL GIVEN to proceed with proposed procedure, without further diagnostic evaluation.    Review of external notes as documented above   [facility]  [facility]              Subjective     HPI related to upcoming procedure: ECT every Monday and Friday. Has been getting court-mandated ECT for past 3 months (since January 2022). Previously getting ECT three times a week, but recently switched to twice weekly.      Preop Questions 4/11/2022   1. Have you ever had a heart attack or stroke? No   2. Have you ever had surgery on your heart or blood vessels, such as a stent placement, a coronary artery bypass, or surgery on an artery in your head, neck, heart, or legs?  No   3. Do you have chest pain with activity? No   4. Do you have a history of  heart failure? No   5. Do you currently have a cold, bronchitis or symptoms of other infection? No   6. Do you have a cough, shortness of breath, or wheezing? No   7. Do you or anyone in your family have previous history of blood clots? YES - grandfather with history of DVT    8. Do you or does anyone in your family have a serious bleeding problem such as prolonged bleeding following surgeries or cuts? No   9. Have you ever had problems with anemia or been told to take iron pills? No   10. Have you had any abnormal blood loss such as black, tarry or bloody stools, or abnormal vaginal bleeding? No   11. Have you ever had a blood transfusion? No   12. Are you willing to have a blood transfusion if it is medically needed before, during, or after your surgery? Yes   13. Have you or any of your relatives ever had problems with anesthesia? No   14. Do you have sleep apnea, excessive snoring or daytime drowsiness? No   15. Do you have any artifical heart valves or other implanted medical devices like a pacemaker, defibrillator, or continuous glucose monitor? No   16. Do you have artificial joints? No   17. Are you allergic to latex? No   18. Is there any chance that you may be pregnant? No     Health Care Directive:  Patient does not have a Health Care Directive or Living Will: Discussed advance care planning with patient; information given to patient to review.    Preoperative Review of :   reviewed - controlled substances prescribed by other outside provider(s).      Status of Chronic Conditions:  See problem list for active medical problems.  Problems all longstanding and stable, except as noted/documented.  See ROS for pertinent symptoms related to these conditions.      Review of Systems  CONSTITUTIONAL: NEGATIVE for fever, chills, change in weight  INTEGUMENTARY/SKIN: NEGATIVE for worrisome rashes, moles or lesions  EYES: NEGATIVE  for vision changes or irritation  ENT/MOUTH: NEGATIVE for ear, mouth and throat problems  RESP: NEGATIVE for significant cough or SOB  CV: NEGATIVE for chest pain, palpitations or peripheral edema  GI: NEGATIVE for nausea, abdominal pain, heartburn, or change in bowel habits  : NEGATIVE for frequency, dysuria, or hematuria  MUSCULOSKELETAL: NEGATIVE for significant arthralgias or myalgia  NEURO: NEGATIVE for weakness, dizziness or paresthesias  ENDOCRINE: NEGATIVE for temperature intolerance, skin/hair changes  HEME: NEGATIVE for bleeding problems  PSYCHIATRIC: POSITIVE foranhedonia, concentration difficulty, Hx anxiety, Hx depression, Hx suicide attempts x 3, feelings of worthlessness/guilt, hallucinations and hopelessness    Patient Active Problem List    Diagnosis Date Noted     Morbid obesity (H) 06/25/2021     Priority: Medium     Auditory hallucination 05/22/2021     Priority: Medium     Psychosis, unspecified psychosis type (H) 05/22/2021     Priority: Medium     Facial cellulitis 04/07/2021     Priority: Medium     Major depressive disorder, single episode, severe with psychotic features (H) 04/23/2020     Priority: Medium     Borderline personality disorder (H) 04/23/2020     Priority: Medium     Suicidal behavior 02/18/2020     Priority: Medium     Major depression 06/20/2019     Priority: Medium     Depression with suicidal ideation 01/03/2019     Priority: Medium     Suicidal ideation 03/22/2018     Priority: Medium     Depression 02/07/2018     Priority: Medium      Past Medical History:   Diagnosis Date     Depressive disorder      Past Surgical History:   Procedure Laterality Date     CHOLECYSTECTOMY       Current Outpatient Medications   Medication Sig Dispense Refill     docusate sodium (COLACE) 100 MG capsule Take 100 mg by mouth 2 times daily       hydrOXYzine (ATARAX) 50 MG tablet Take 50 mg by mouth 3 times daily as needed for itching       lamoTRIgine (LAMICTAL) 25 MG tablet Take 1 tablet (25  "mg) by mouth daily for 11 days, THEN 2 tablets (50 mg) daily for 14 days, THEN 4 tablets (100 mg) daily for 14 days. 95 tablet 0     melatonin 3 MG tablet Take 3 mg by mouth daily as needed       OLANZapine (ZYPREXA) 10 MG tablet Take 10 mg by mouth       OLANZapine (ZYPREXA) 20 MG tablet Take 20 mg by mouth       OLANZapine (ZYPREXA) 5 MG tablet Take 5 mg by mouth       polyethylene glycol (MIRALAX) 17 GM/Dose powder Take 17 g by mouth daily 510 g 0     SENNA-docusate sodium (SENNA S) 8.6-50 MG tablet Take 1 tablet by mouth nightly as needed (constipation) (Patient not taking: Reported on 9/1/2021) 90 tablet 0     traZODone (DESYREL) 100 MG tablet Take 100 mg by mouth daily       venlafaxine (EFFEXOR-XR) 75 MG 24 hr capsule Take 3 capsules (225 mg) by mouth daily (with breakfast) 90 capsule 0     Vitamin D, Cholecalciferol, 25 MCG (1000 UT) CAPS          No Known Allergies     Social History     Tobacco Use     Smoking status: Never Smoker     Smokeless tobacco: Never Used   Substance Use Topics     Alcohol use: Not Currently       History   Drug Use Unknown         Objective     /81   Pulse 105   Temp 98.6  F (37  C) (Oral)   Resp 16   Ht 1.676 m (5' 6\")   Wt 129.3 kg (285 lb)   LMP 03/14/2022 (Approximate)   SpO2 95%   Breastfeeding No   BMI 46.00 kg/m      Physical Exam    GENERAL APPEARANCE: healthy, alert and no distress     EYES: EOMI, PERRL     HENT: ear canals and TM's normal and nose and mouth without ulcers or lesions     NECK: no adenopathy, no asymmetry, masses, or scars and thyroid normal to palpation     RESP: lungs clear to auscultation - no rales, rhonchi or wheezes     CV: regular rates and rhythm, normal S1 S2, no S3 or S4 and no murmur, click or rub     ABDOMEN:  soft, nontender, no HSM or masses and bowel sounds normal     MS: extremities normal- no gross deformities noted, no evidence of inflammation in joints, FROM in all extremities.     SKIN: no suspicious lesions or rashes    "  NEURO: Normal strength and tone, sensory exam grossly normal, mentation intact and speech normal     PSYCH: mentation appears normal. and affect depressed     LYMPHATICS: No cervical adenopathy    Recent Labs   Lab Test 05/23/21  0714 04/09/21  0724 02/12/21  0731 02/10/21  1220   HGB 12.9 9.9*   < > 14.3    241   < > 381   INR  --   --   --  1.05    141   < > 138   POTASSIUM 3.8 4.0   < > 3.9   CR 0.84 0.64   < > 0.73    < > = values in this interval not displayed.        Diagnostics:  No labs were ordered during this visit.   No EKG required for low risk surgery (cataract, skin procedure, breast biopsy, etc).    Revised Cardiac Risk Index (RCRI):  The patient has the following serious cardiovascular risks for perioperative complications:   - No serious cardiac risks = 0 points     RCRI Interpretation: 0 points: Class I (very low risk - 0.4% complication rate)       Signed Electronically by: Debra Schmidt MD  Copy of this evaluation report is provided to requesting physician.       MEDICATIONS  (PRN):  acetaminophen     Tablet .. 650 milliGRAM(s) Oral every 6 hours PRN Temp greater or equal to 38C (100.4F), Mild Pain (1 - 3)  aluminum hydroxide/magnesium hydroxide/simethicone Suspension 30 milliLiter(s) Oral every 6 hours PRN Dyspepsia  diphenhydrAMINE 50 milliGRAM(s) Oral every 6 hours PRN Extrapyramidal symptoms or prophylaxis  diphenhydrAMINE Injectable 50 milliGRAM(s) IntraMuscular once PRN Extrapyramidal prophylaxis  haloperidol     Tablet 5 milliGRAM(s) Oral every 6 hours PRN agitation  haloperidol    Injectable 5 milliGRAM(s) IntraMuscular once PRN aggression  LORazepam     Tablet 2 milliGRAM(s) Oral every 6 hours PRN severe anxiety, agitation  LORazepam   Injectable 2 milliGRAM(s) IntraMuscular once PRN agitation  magnesium hydroxide Suspension 30 milliLiter(s) Oral daily PRN Constipation

## 2024-11-30 NOTE — ANESTHESIA PREPROCEDURE EVALUATION
Anesthesia Pre-Procedure Evaluation    Patient: Misty Parham   MRN: [id number] : 1985          Preoperative Diagnosis: * No pre-op diagnosis entered *    * No procedures listed *    Past Medical History:   Diagnosis Date     Depressive disorder      Past Surgical History:   Procedure Laterality Date     CHOLECYSTECTOMY         Anesthesia Evaluation     .             ROS/MED HX    ENT/Pulmonary:      (-) tobacco use, asthma and sleep apnea   Neurologic:      (-) seizures   Cardiovascular:  - neg cardiovascular ROS      (-) hypertension and dyslipidemia   METS/Exercise Tolerance:     Hematologic:         Musculoskeletal:         GI/Hepatic:        (-) GERD   Renal/Genitourinary:         Endo:     (+) Obesity, .   (-) Type II DM   Psychiatric:     (+) psychiatric history depression      Infectious Disease:         Malignancy:         Other:                          Physical Exam  Normal systems: dental    Airway   Mallampati: III  TM distance: >3 FB  Neck ROM: full    Dental     Cardiovascular   Rhythm and rate: regular  (-) no murmur    Pulmonary    breath sounds clear to auscultation            Lab Results   Component Value Date    WBC 7.6 10/08/2019    HGB 13.1 10/08/2019    HCT 40.7 10/08/2019     10/08/2019     10/08/2019    POTASSIUM 4.0 10/08/2019    CHLORIDE 109 10/08/2019    CO2 28 10/08/2019    BUN 14 10/08/2019    CR 1.00 10/08/2019    GLC 95 10/08/2019    CRISTINE 9.2 10/08/2019    ALBUMIN 3.7 2019    PROTTOTAL 7.8 2019    ALT 21 2019    AST 20 2019    ALKPHOS 76 2019    BILITOTAL 0.4 2019    TSH 1.26 2019    HCG Negative 10/07/2019       Preop Vitals  BP Readings from Last 3 Encounters:   10/14/19 115/74   19 107/68   19 134/86    Pulse Readings from Last 3 Encounters:   10/12/19 85   19 80   19 79      Resp Readings from Last 3 Encounters:   10/14/19 16   19 16   19 18    SpO2 Readings from Last 3 Encounters:  "  10/14/19 99%   09/20/19 96%   07/03/19 96%      Temp Readings from Last 1 Encounters:   10/14/19 36.6  C (97.8  F) (Oral)    Ht Readings from Last 1 Encounters:   10/07/19 1.651 m (5' 5\")      Wt Readings from Last 1 Encounters:   10/08/19 101.2 kg (223 lb)    Estimated body mass index is 37.11 kg/m  as calculated from the following:    Height as of this encounter: 1.651 m (5' 5\").    Weight as of this encounter: 101.2 kg (223 lb).       Anesthesia Plan      History & Physical Review  History and physical reviewed and following examination; no interval change.    ASA Status:  2 .    NPO Status:  > 8 hours    Plan for General with Intravenous induction.   PONV prophylaxis:  Ondansetron (or other 5HT-3)       Postoperative Care      Consents  Anesthetic plan, risks, benefits and alternatives discussed with:  Patient.  Use of blood products discussed: No .   .                 Nilo Thomas MD  " Admission

## 2024-12-27 ENCOUNTER — HOSPITAL ENCOUNTER (EMERGENCY)
Facility: CLINIC | Age: 39
Discharge: HOME | End: 2024-12-28
Attending: FAMILY MEDICINE | Admitting: FAMILY MEDICINE
Payer: MEDICARE

## 2024-12-27 DIAGNOSIS — F60.3 BORDERLINE PERSONALITY DISORDER (H): ICD-10-CM

## 2024-12-27 DIAGNOSIS — F32.A DEPRESSION, UNSPECIFIED DEPRESSION TYPE: ICD-10-CM

## 2024-12-27 LAB
AMPHETAMINES UR QL SCN: ABNORMAL
BARBITURATES UR QL SCN: ABNORMAL
BENZODIAZ UR QL SCN: ABNORMAL
BZE UR QL SCN: ABNORMAL
CANNABINOIDS UR QL SCN: ABNORMAL
FENTANYL UR QL: ABNORMAL
HCG UR QL: NEGATIVE
OPIATES UR QL SCN: ABNORMAL
PCP QUAL URINE (ROCHE): ABNORMAL

## 2024-12-27 PROCEDURE — 99285 EMERGENCY DEPT VISIT HI MDM: CPT | Performed by: FAMILY MEDICINE

## 2024-12-27 PROCEDURE — 80307 DRUG TEST PRSMV CHEM ANLYZR: CPT | Performed by: FAMILY MEDICINE

## 2024-12-27 PROCEDURE — 81025 URINE PREGNANCY TEST: CPT | Performed by: FAMILY MEDICINE

## 2024-12-27 ASSESSMENT — ACTIVITIES OF DAILY LIVING (ADL)
ADLS_ACUITY_SCORE: 56

## 2024-12-27 ASSESSMENT — COLUMBIA-SUICIDE SEVERITY RATING SCALE - C-SSRS
5. HAVE YOU STARTED TO WORK OUT OR WORKED OUT THE DETAILS OF HOW TO KILL YOURSELF? DO YOU INTEND TO CARRY OUT THIS PLAN?: YES
1. IN THE PAST MONTH, HAVE YOU WISHED YOU WERE DEAD OR WISHED YOU COULD GO TO SLEEP AND NOT WAKE UP?: YES
3. HAVE YOU BEEN THINKING ABOUT HOW YOU MIGHT KILL YOURSELF?: YES
6. HAVE YOU EVER DONE ANYTHING, STARTED TO DO ANYTHING, OR PREPARED TO DO ANYTHING TO END YOUR LIFE?: YES
4. HAVE YOU HAD THESE THOUGHTS AND HAD SOME INTENTION OF ACTING ON THEM?: YES
2. HAVE YOU ACTUALLY HAD ANY THOUGHTS OF KILLING YOURSELF IN THE PAST MONTH?: YES

## 2024-12-27 NOTE — ED TRIAGE NOTES
Triage Assessment (Adult)       Row Name 12/27/24 1748          Triage Assessment    Airway WDL X        Respiratory WDL    Respiratory WDL WDL        Skin Circulation/Temperature WDL    Skin Circulation/Temperature WDL WDL        Cardiac WDL    Cardiac WDL rhythm     Pulse Rate & Regularity radial pulse regular        Cognitive/Neuro/Behavioral WDL    Cognitive/Neuro/Behavioral WDL mood/behavior     Mood/Behavior sad;cooperative;calm                      [FreeTextEntry1] : \par Recurrent UTI:\par UCx obtained.\par Will follow up with results\par If negative, will start on Keflex 250 mg daily x 3 months for suppression\par Patient voiced her understanding and agrees with the plan

## 2024-12-27 NOTE — ED NOTES
Bed: [facility]  Expected date: 12/27/24  Expected time: 5:20 PM  Means of arrival: Ambulance  Comments:  [facility] 40yo female, suicidal,

## 2024-12-28 ENCOUNTER — TELEPHONE (OUTPATIENT)
Dept: BEHAVIORAL HEALTH | Facility: CLINIC | Age: 39
End: 2024-12-28
Payer: MEDICARE

## 2024-12-28 VITALS
RESPIRATION RATE: 18 BRPM | OXYGEN SATURATION: 97 % | DIASTOLIC BLOOD PRESSURE: 86 MMHG | TEMPERATURE: 98.1 F | SYSTOLIC BLOOD PRESSURE: 115 MMHG | HEART RATE: 92 BPM

## 2024-12-28 ASSESSMENT — ACTIVITIES OF DAILY LIVING (ADL): ADLS_ACUITY_SCORE: 56

## 2024-12-28 NOTE — CONSULTS
"Diagnostic Evaluation Consultation  Crisis Assessment    Patient Name: Misty Parham  Age:  39 year old  Legal Sex: female  Gender Identity: female  Pronouns:   Race: White  Ethnicity: Not  or   Language: English      Patient was assessed: Virtual: iPad   Crisis Assessment Start Date: 12/27/24  Crisis Assessment Start Time: 2050  Crisis Assessment Stop Time: 2120  Patient location: [facility] EMERGENCY DEPARTMENT                             [facility]    Referral Data and Chief Complaint  Misty Parham presents to the ED via EMS. Patient is presenting to the ED for the following concerns: Suicidal ideation. Factors that make the mental health crisis life threatening or complex are: Pt reports struggling for about a week. She endorses SI with plan to save meds for a couple days ( staff administer meds) and overdose. She would not disclose which medication, but did say she looked it up online and that she only needed \"about 6 pills to fall asleep and never wake up\". She identified increased stress due to a large cell phone bill from international calls made by her ex- with whom she shares a phone plan. Today she sent a suicidal message to her psychiatrist. Pt was assessed by [facility] and recommended that she go to ED..      Informed Consent and Assessment Methods  Explained the crisis assessment process, including applicable information disclosures and limits to confidentiality, assessed understanding of the process, and obtained consent to proceed with the assessment.  Assessment methods included conducting a formal interview with patient, review of medical records, collaboration with medical staff, and obtaining relevant collateral information from family and community providers when available.  : done     History of the Crisis   Pt has extensive MH history of depression, Borderline Personality Disorder, SI, SIB, anxiety. Records suggest a pattern of messaging psychiatrist when " experiencing SI.    Brief Psychosocial History  Family:  , Children yes (2 bio children ages 18 and 21 (live with Pt's sister), step-daughter age 28)  Support System:  Sibling(s), Parent(s) (mom and older sister)  Employment Status:  disabled  Source of Income:  disability  Financial Environmental Concerns:  none  Current Hobbies:  music  Barriers in Personal Life:       Significant Clinical History  Current Anxiety Symptoms:   (none noted)  Current Depression/Trauma:  apathy, impaired decision making, sadness, thoughts of death/suicide (I am not afraid to die. I would become an anmol)  Current Somatic Symptoms:   (none noted)  Current Psychosis/Thought Disturbance:   (none noted)  Current Eating Symptoms:   (none noted)  Chemical Use History:  Alcohol: None  Benzodiazepines: None  Opiates: None  Cocaine: None  Marijuana: None  Other Use: None   Past diagnosis:  Anxiety Disorder, Depression, Suicide attempt(s), Personality Disorder  Family history:  No known history of mental health or chemical health concerns  Past treatment:  Individual therapy, Case management, Civil Commitment, Psychiatric Medication Management, Inpatient Hospitalization, Supportive Living Environment (group home, skilled nursing house, etc)  Details of most recent treatment:  Pt is currently in a group home. She has case management, therapy and psychiatry  Other relevant history:       Have there been any medication changes in the past two weeks:  yes, please comment (lithium was decreased as it was affecting thyroid)       Is the patient compliant with medications:  yes        Collateral Information  Is there collateral information: Yes     Collateral information name, relationship, phone number:  Twila [phone]    What happened today: Pt was at mother's house and staff was not aware she was in ED     What is different about patient's functioning: this seems to be at/near baseline for Pt     What do you think the patient needs:      Has  patient made comments about wanting to kill themselves/others:  (Pt was not at )    If d/c is recommended, can they take part in safety/aftercare planning:  yes    Additional collateral information:   Manager Avril [phone]     Risk Assessment  [place] Suicide Severity Rating Scale Full Clinical Version:  Suicidal Ideation  Q6 Suicide Behavior (Lifetime): yes          [place] Suicide Severity Rating Scale Recent:   Suicidal Ideation (Recent)  Q1 Wished to be Dead (Past Month): yes  Q2 Suicidal Thoughts (Past Month): yes  Q3 Suicidal Thought Method: yes  Q4 Suicidal Intent without Specific Plan: yes  Q5 Suicide Intent with Specific Plan: yes (plans to overdose on her medication)  If yes to Q6, within past 3 months?: no (2 years ago overdosed on tylenol)  Level of Risk per Screen: high risk          Environmental or Psychosocial Events: challenging interpersonal relationships, other life stressors, neither working nor attending school  Protective Factors: Protective Factors: lives in a responsibly safe and stable environment, supportive ongoing medical and mental health care relationships, good treatment engagement    Does the patient have thoughts of harming others? Feels Like Hurting Others: no  Previous Attempt to Hurt Others: no  Is the patient engaging in sexually inappropriate behavior?: no  Does Patient have a known history of aggressive behavior: No    Is the patient engaging in sexually inappropriate behavior?  no        Mental Status Exam   Affect: Flat  Appearance: Appropriate  Attention Span/Concentration: Attentive  Eye Contact: Variable    Fund of Knowledge: Appropriate   Language /Speech Content: Fluent  Language /Speech Volume: Normal  Language /Speech Rate/Productions: Normal  Recent Memory: Intact  Remote Memory: Intact  Mood: Sad  Orientation to Person: Yes   Orientation to Place: Yes  Orientation to Time of Day: Yes  Orientation to Date: Yes     Situation (Do they understand why they are  here?): Yes  Psychomotor Behavior: Normal  Thought Content: Suicidal  Thought Form: Intact     Mini-Cog Assessment  Number of Words Recalled:    Clock-Drawing Test:     Three Item Recall:    Mini-Cog Total Score:       Medication  Psychotropic medications:   Medication Orders - Psychiatric (From admission, onward)      None             Current Care Team  Patient Care Team:  [facility] Cinthya Pershing as PCP - Jose Juan Gil [facility] as Pharmacist (Pharmacist)  Jelena Mackey MD as Assigned PCP  Dinesh Renee OD as Assigned Surgical Provider    Diagnosis  Patient Active Problem List   Diagnosis Code    Depression F32.A    Suicidal ideation R45.851    Depression with suicidal ideation F32.A, R45.851    Major depression F32.9    Suicidal behavior R45.89    Facial cellulitis L03.211    Auditory hallucination R44.0    Psychosis, unspecified psychosis type (H) F29    Morbid obesity (H) E66.01    Chronic insomnia F51.04    Generalized anxiety disorder F41.1    COVID-19 U07.1    Borderline personality disorder (H) F60.3       Primary Problem This Admission  Active Hospital Problems    Borderline personality disorder (H)      Depression with suicidal ideation        Clinical Summary and Substantiation of Recommendations   Clinical Substantiation:  Pt presents in ED for SI with plan to overdose on her meds. Pt sent a message to psychiatrist today indicating she was experiencing SI. [facility] met with Pt and recommended she be seen at ED. Pt was calm and cooperative in ED. She continues to endorse SI with plan tp save meds for a couple days to overdose.  staff administer Pt's meds and supervise her taking them. Records suggest that Pt is at/near baseline for SI. Attending MD and LMHP concur that Pt seems to have sufficient supports and outpatient services to safely discharge, and that admission would have minimal, if any, impact on chronic symptoms of personality disorder.    Goals for crisis stabilization:        Next steps for Care Team:       Treatment Objectives Addressed:  safety planning, identifying an appropriate aftercare plan, assessing safety, identifying additional supports, Lethal means counseling    Therapeutic Interventions:  Engaged in safety planning, Explored barriers to reducing stressors.    Has a specific means been identified for suicidal/homicide actions: Yes    If yes, describe:  Overdose on meds. Would need to save meds for a few days as  staff administer meds    Explain action steps toward mitigation:  Informed  staff of Pt's plan. Staff stated Pt is supervised when taking meds.    Document completion of mitigation actions:       The follow up action still needed prior to discharge:       Patient coping skills attempted to reduce the crisis:  listen to music    Disposition  Recommended referrals: Individual Therapy, Medication Management (Pt has appointments already scheduled for next week)        Reviewed case and recommendations with attending provider. Attending Name: Dr Barry Rose       Attending concurs with disposition: yes       Patient and/or validated legal guardian concurs with disposition:   yes       Final disposition:  discharge    Assessment Details   Total duration spent with the patient: 30 min     CPT code(s) utilized: 00782 - Psychotherapy for Crisis - 60 (30-74*) min    HAIM Merrill, Psychotherapist  [facility] - Triage & Transition Services  Callback: [phone]

## 2024-12-28 NOTE — DISCHARGE INSTRUCTIONS
Attend previously scheduled appointments: therapy 12/30 and psychiatry 12/31    Safety Plan    Creation Date: 12/27/24       Step 1: Warning signs:  Warning Signs   SI, urges for SIB      Step 2: Internal coping strategies - Things I can do to take my mind off my problems without contacting another person:  Strategies   listen to music      Step 3: People and social settings that provide distraction:  Name Contact Information   mom and sister          Step 4: People whom I can ask for help during a crisis:  Name Contact Information    staff    therapist    pscyhiatrist    mom    sister       Step 5: Professionals or agencies I can contact during a crisis:  Clinician/Agency Name Phone Emergency Contact   [facility] [phone]       Suicide Prevention Lifeline Phone: Call or Text 021  Crisis Text Line: Text HOME to 159619     Step 6: Making the environment safer (plan for lethal means safety):  Meds are secured at group home. Staff administer meds and supervise Pt taking meds     Optional: What is most important to me and worth living for?:

## 2024-12-28 NOTE — PROGRESS NOTES
[facility] called Group Home ([facility]). Staff on duty tonight (Twila) stated she is at facility to receive Pt tonight when she returns by med transportation.    Address is [address] [place] [address] [phone]   (Avril) [phone]

## 2024-12-28 NOTE — ED NOTES
11:42 PM [facility] was called to set up a ride. ETA [id number] per Deborah.    11:45 PM Group home staff Twila Ruggiero [phone] was called and was updated on pt's ambulance ETA. Twila was informed that when ambulance arrives in the ED the group home will be called again. Group home address: [address] [place] [address].    Both primary RN and MD were updated on the above note.

## 2024-12-28 NOTE — PROGRESS NOTES
Brinda from [phone] called for update and discharge plan.  [facility] noted that Pt seems to be at/near baseline for SI, and informed her that Pt had shared plan to save meds for a couple days. Brinda stated that she would ask staff to observe her closely when administering meds, as well as reminding staff that Pt was given meds for the couple days she planned to be at her mother's house.

## 2024-12-28 NOTE — ED PROVIDER NOTES
[facility] [place] [place]27/24       ED PROVIDER NOTE    History     Chief Complaint   Patient presents with    Suicidal     BIBA per report patient was picked up from her parents house. Per report patient told her Mother that she is suicidal with the plan to overdose. 911 was called. [facility]IN placed patient on a hold. +recent change on med lithium was dropped from 600 mg to 450 mg recently.     HPI  Misty Parham is a 39 year old female with a past medical history of suicidal ideation, depression, psychosis, and hallucinations, who presents to the ED for evaluation of suicidal ideation.  Patient has been having some increase in hallucinations and has had some ongoing suicidal ideation she recently spent time at her parents home and was on her way back to her group home and expressed suicidal thoughts to her mother patient was evaluated by Esvin and sent to the emergency room for further evaluation patient has history of previous suicidal ideation with continued ongoing outpatient management at this time patient will be seen and evaluated by the DEC  for further evaluation.    Past Medical History  Past Medical History:   Diagnosis Date    Depressive disorder     Generalized anxiety disorder 5/15/2020     Past Surgical History:   Procedure Laterality Date    CHOLECYSTECTOMY       clonazePAM (KLONOPIN) 1 MG tablet  divalproex sodium extended-release (DEPAKOTE ER) 500 MG 24 hr tablet  docusate sodium (COLACE) 100 MG capsule  hydrOXYzine HCl (ATARAX) 50 MG tablet  levothyroxine (SYNTHROID/LEVOTHROID) 25 MCG tablet  lithium (ESKALITH) 300 MG tablet  metFORMIN (GLUCOPHAGE) 1000 MG tablet  miconazole (MICATIN) 2 % external powder  OLANZapine (ZYPREXA) 10 MG tablet  OLANZapine (ZYPREXA) 5 MG tablet  Omega-3 Fish Oil 500 MG capsule  polyethylene glycol (MIRALAX) 17 GM/Dose powder  prazosin (MINIPRESS) 2 MG capsule  traZODone (DESYREL) 150 MG tablet  traZODone (DESYREL) 50 MG  tablet  valACYclovir (VALTREX) 1000 mg tablet      No Known Allergies  Family History  Family History   Problem Relation Age of Onset    Diabetes Father      Social History   Social History     Tobacco Use    Smoking status: Never     Passive exposure: Current    Smokeless tobacco: Never   Vaping Use    Vaping status: Never Used   Substance Use Topics    Alcohol use: Not Currently    Drug use: Never      A complete review of systems was performed with pertinent positives and negatives noted in the HPI, and all other systems negative.    Physical Exam   BP: (!) 132/95  Pulse: 98  Temp: 98.6  F (37  C)  Resp: 18  SpO2: 97 %  Physical Exam  Constitutional:       General: She is not in acute distress.     Appearance: Normal appearance. She is not diaphoretic.   HENT:      Head: Atraumatic.      Mouth/Throat:      Mouth: Mucous membranes are moist.   Eyes:      General: No scleral icterus.     Conjunctiva/sclera: Conjunctivae normal.   Cardiovascular:      Rate and Rhythm: Normal rate.      Heart sounds: Normal heart sounds.   Pulmonary:      Effort: No respiratory distress.      Breath sounds: Normal breath sounds.   Abdominal:      General: Abdomen is flat.   Musculoskeletal:      Cervical back: Neck supple.   Skin:     General: Skin is warm.      Findings: No rash.   Neurological:      General: No focal deficit present.      Mental Status: She is alert and oriented to person, place, and time.      Sensory: No sensory deficit.      Motor: No weakness.      Coordination: Coordination normal.   Psychiatric:         Mood and Affect: Mood is anxious and depressed.         Speech: Speech normal.         Behavior: Behavior is cooperative.         Thought Content: Thought content includes suicidal ideation.           ED Course, Procedures, & Data      Procedures     Results for orders placed or performed during the hospital encounter of 12/27/24   HCG qualitative urine     Status: Normal   Result Value Ref Range    hCG Urine  Qualitative Negative Negative   Urine Drug Screen Panel     Status: Abnormal   Result Value Ref Range    Amphetamines Urine Screen Negative Screen Negative    Barbituates Urine Screen Negative Screen Negative    Benzodiazepine Urine Screen Positive (A) Screen Negative    Cannabinoids Urine Screen Negative Screen Negative    Cocaine Urine Screen Negative Screen Negative    Fentanyl Qual Urine Screen Negative Screen Negative    Opiates Urine Screen Negative Screen Negative    PCP Urine Screen Negative Screen Negative   Urine Drug Screen     Status: Abnormal    Narrative    The following orders were created for panel order Urine Drug Screen.  Procedure                               Abnormality         Status                     ---------                               -----------         ------                     Urine Drug Screen Panel[[id number]]      Abnormal            Final result                 Please view results for these tests on the individual orders.     Medications - No data to display  Labs Ordered and Resulted from Time of ED Arrival to Time of ED Departure   URINE DRUG SCREEN PANEL - Abnormal       Result Value    Amphetamines Urine Screen Negative      Barbituates Urine Screen Negative      Benzodiazepine Urine Screen Positive (*)     Cannabinoids Urine Screen Negative      Cocaine Urine Screen Negative      Fentanyl Qual Urine Screen Negative      Opiates Urine Screen Negative      PCP Urine Screen Negative     HCG QUALITATIVE URINE - Normal    hCG Urine Qualitative Negative       No orders to display          Critical care was not performed.     Medical Decision Making  The patient's presentation was of moderate complexity (a chronic illness mild to moderate exacerbation, progression, or side effect of treatment).    The patient's evaluation involved:  ordering and/or review of 2 test(s) in this encounter (see separate area of note for details)  discussion of management or test interpretation with  another health professional (patient seen and evaluated by independent professional with full DEC assessment we discussed the possibility of admission to the hospital versus outpatient management was felt as though patient was essentially now at baseline and has plans to return back to current group home continue with current management and follow-up with outpatient providers this week.  If patient is at increased risk would be returning back to the emergency room.)    The patient's management necessitated high risk (a decision regarding hospitalization).    Assessment & Plan        I have reviewed the nursing notes. I have reviewed the findings, diagnosis, plan and need for follow up with the patient.        Final diagnoses:   Borderline personality disorder (H)   Depression, unspecified depression type       Barry FLORES [facility] EMERGENCY DEPARTMENT  12/27/2024     Barry Rose MD  12/28/24 0052

## 2025-01-30 DIAGNOSIS — E66.01 MORBID OBESITY (H): ICD-10-CM

## 2025-02-05 ENCOUNTER — MEDICAL CORRESPONDENCE (OUTPATIENT)
Dept: HEALTH INFORMATION MANAGEMENT | Facility: CLINIC | Age: 40
End: 2025-02-05
Payer: MEDICARE

## 2025-02-05 ENCOUNTER — TELEPHONE (OUTPATIENT)
Dept: FAMILY MEDICINE | Facility: CLINIC | Age: 40
End: 2025-02-05
Payer: MEDICARE

## 2025-02-05 NOTE — TELEPHONE ENCOUNTER
Forms/Letter Request    Type of form/letter: OTHER: Medication list     Do we have the form/letter: Yes, red folder, Dr. Mackey's inbox.    Who is the form from? [facility]    Where did/will the form come from? form was faxed in    When is form/letter needed by: As able    How would you like the form/letter returned:   Fax to [phone]     Genevieve Dawkins on 2/5/2025 at 5:31 PM

## 2025-02-06 NOTE — TELEPHONE ENCOUNTER
Forms signed and placed in my out box.  Please scan in patient's chart before doing the needful.    Thank you,  Jelena Mackey MD on 2/5/2025

## 2025-02-22 ENCOUNTER — MYC MEDICAL ADVICE (OUTPATIENT)
Dept: FAMILY MEDICINE | Facility: CLINIC | Age: 40
End: 2025-02-22
Payer: MEDICARE

## 2025-02-22 DIAGNOSIS — R21 RASH: ICD-10-CM

## 2025-04-01 ENCOUNTER — HOSPITAL ENCOUNTER (OUTPATIENT)
Facility: CLINIC | Age: 40
Setting detail: OBSERVATION
Discharge: PSYCHIATRIC OR SUBSTANCE USE FACILITY | End: 2025-04-03
Attending: EMERGENCY MEDICINE | Admitting: PSYCHIATRY & NEUROLOGY
Payer: MEDICARE

## 2025-04-01 DIAGNOSIS — F41.1 GENERALIZED ANXIETY DISORDER: ICD-10-CM

## 2025-04-01 DIAGNOSIS — R44.0 AUDITORY HALLUCINATION: ICD-10-CM

## 2025-04-01 DIAGNOSIS — R45.851 SUICIDAL IDEATION: ICD-10-CM

## 2025-04-01 DIAGNOSIS — F60.3 BORDERLINE PERSONALITY DISORDER (H): ICD-10-CM

## 2025-04-01 DIAGNOSIS — F33.3 SEVERE EPISODE OF RECURRENT MAJOR DEPRESSIVE DISORDER, WITH PSYCHOTIC FEATURES (H): ICD-10-CM

## 2025-04-01 PROBLEM — F32.A DEPRESSION, UNSPECIFIED DEPRESSION TYPE: Status: ACTIVE | Noted: 2025-04-01

## 2025-04-01 PROCEDURE — 250N000013 HC RX MED GY IP 250 OP 250 PS 637: Performed by: EMERGENCY MEDICINE

## 2025-04-01 PROCEDURE — 99285 EMERGENCY DEPT VISIT HI MDM: CPT

## 2025-04-01 PROCEDURE — G0378 HOSPITAL OBSERVATION PER HR: HCPCS

## 2025-04-01 PROCEDURE — 250N000013 HC RX MED GY IP 250 OP 250 PS 637: Performed by: NURSE PRACTITIONER

## 2025-04-01 RX ORDER — HYDROXYZINE HYDROCHLORIDE 25 MG/1
25 TABLET, FILM COATED ORAL EVERY 4 HOURS PRN
Status: DISCONTINUED | OUTPATIENT
Start: 2025-04-01 | End: 2025-04-01

## 2025-04-01 RX ORDER — ACETAMINOPHEN 325 MG/1
650 TABLET ORAL EVERY 4 HOURS PRN
Status: DISCONTINUED | OUTPATIENT
Start: 2025-04-01 | End: 2025-04-03 | Stop reason: HOSPADM

## 2025-04-01 RX ORDER — HYDROXYZINE HYDROCHLORIDE 50 MG/1
50 TABLET, FILM COATED ORAL 2 TIMES DAILY
Status: DISCONTINUED | OUTPATIENT
Start: 2025-04-01 | End: 2025-04-01

## 2025-04-01 RX ORDER — LAMOTRIGINE 150 MG/1
150 TABLET ORAL 2 TIMES DAILY
Status: DISCONTINUED | OUTPATIENT
Start: 2025-04-01 | End: 2025-04-03 | Stop reason: HOSPADM

## 2025-04-01 RX ORDER — HYDROXYZINE HYDROCHLORIDE 25 MG/1
25 TABLET, FILM COATED ORAL
Status: DISCONTINUED | OUTPATIENT
Start: 2025-04-01 | End: 2025-04-01

## 2025-04-01 RX ORDER — CLONAZEPAM 1 MG/1
1 TABLET ORAL 3 TIMES DAILY
Status: DISCONTINUED | OUTPATIENT
Start: 2025-04-01 | End: 2025-04-03 | Stop reason: HOSPADM

## 2025-04-01 RX ORDER — IBUPROFEN 600 MG/1
600 TABLET, FILM COATED ORAL EVERY 6 HOURS PRN
Status: DISCONTINUED | OUTPATIENT
Start: 2025-04-01 | End: 2025-04-03 | Stop reason: HOSPADM

## 2025-04-01 RX ORDER — LAMOTRIGINE 150 MG/1
150 TABLET ORAL 2 TIMES DAILY
COMMUNITY

## 2025-04-01 RX ORDER — DIVALPROEX SODIUM 500 MG/1
1000 TABLET, FILM COATED, EXTENDED RELEASE ORAL AT BEDTIME
Status: DISCONTINUED | OUTPATIENT
Start: 2025-04-01 | End: 2025-04-01

## 2025-04-01 RX ORDER — PRAZOSIN HYDROCHLORIDE 2 MG/1
2 CAPSULE ORAL AT BEDTIME
Status: DISCONTINUED | OUTPATIENT
Start: 2025-04-01 | End: 2025-04-03 | Stop reason: HOSPADM

## 2025-04-01 RX ORDER — OLANZAPINE 5 MG/1
5 TABLET, FILM COATED ORAL 2 TIMES DAILY
Status: DISCONTINUED | OUTPATIENT
Start: 2025-04-02 | End: 2025-04-03 | Stop reason: HOSPADM

## 2025-04-01 RX ORDER — OLANZAPINE 10 MG/1
10 TABLET, FILM COATED ORAL AT BEDTIME
Status: DISCONTINUED | OUTPATIENT
Start: 2025-04-01 | End: 2025-04-02

## 2025-04-01 RX ORDER — HYDROXYZINE HYDROCHLORIDE 50 MG/1
50 TABLET, FILM COATED ORAL EVERY 6 HOURS PRN
Status: DISCONTINUED | OUTPATIENT
Start: 2025-04-01 | End: 2025-04-03 | Stop reason: HOSPADM

## 2025-04-01 RX ORDER — OLANZAPINE 5 MG/1
5 TABLET, ORALLY DISINTEGRATING ORAL 2 TIMES DAILY
Status: DISCONTINUED | OUTPATIENT
Start: 2025-04-02 | End: 2025-04-01

## 2025-04-01 RX ORDER — TRAZODONE HYDROCHLORIDE 150 MG/1
300 TABLET ORAL AT BEDTIME
Status: DISCONTINUED | OUTPATIENT
Start: 2025-04-01 | End: 2025-04-03 | Stop reason: HOSPADM

## 2025-04-01 RX ORDER — DOCUSATE SODIUM 100 MG/1
100 CAPSULE, LIQUID FILLED ORAL 2 TIMES DAILY
Status: DISCONTINUED | OUTPATIENT
Start: 2025-04-01 | End: 2025-04-03 | Stop reason: HOSPADM

## 2025-04-01 RX ADMIN — DOCUSATE SODIUM 100 MG: 100 CAPSULE, LIQUID FILLED ORAL at 22:18

## 2025-04-01 RX ADMIN — PRAZOSIN HYDROCHLORIDE 2 MG: 2 CAPSULE ORAL at 22:18

## 2025-04-01 RX ADMIN — OLANZAPINE 10 MG: 10 TABLET, FILM COATED ORAL at 22:18

## 2025-04-01 RX ADMIN — TRAZODONE HYDROCHLORIDE 300 MG: 150 TABLET ORAL at 22:18

## 2025-04-01 RX ADMIN — LAMOTRIGINE 150 MG: 150 TABLET ORAL at 23:48

## 2025-04-01 RX ADMIN — CLONAZEPAM 1 MG: 1 TABLET ORAL at 22:18

## 2025-04-01 RX ADMIN — METFORMIN HYDROCHLORIDE 1000 MG: 500 TABLET ORAL at 22:18

## 2025-04-01 ASSESSMENT — ACTIVITIES OF DAILY LIVING (ADL)
ADLS_ACUITY_SCORE: 56

## 2025-04-01 NOTE — ED PROVIDER NOTES
History     Chief Complaint:  Suicidal       HPI   Misty Parham is a 39 year old female with history of depression anxiety borderline personality disorder who presents emergency department for evaluation of depression, suicidal ideation, with plans to cut herself.  She lives in a group home.  Denies any fever or infectious symptoms.  Reports compliance with her medications    Independent Historian:    None    Review of External Notes:  Reviewed behavioral health note from 3/19/2025, patient has a history of anxiety, depression, borderline personality disorder, suicidal behavior    Medications:    clonazePAM (KLONOPIN) 1 MG tablet  divalproex sodium extended-release (DEPAKOTE ER) 500 MG 24 hr tablet  docusate sodium (COLACE) 100 MG capsule  hydrOXYzine HCl (ATARAX) 50 MG tablet  levothyroxine (SYNTHROID/LEVOTHROID) 25 MCG tablet  lithium (ESKALITH) 300 MG tablet  metFORMIN (GLUCOPHAGE) 1000 MG tablet  miconazole (MICATIN) 2 % external powder  OLANZapine (ZYPREXA) 10 MG tablet  OLANZapine (ZYPREXA) 5 MG tablet  Omega-3 Fish Oil 500 MG capsule  polyethylene glycol (MIRALAX) 17 GM/Dose powder  prazosin (MINIPRESS) 2 MG capsule  traZODone (DESYREL) 150 MG tablet  traZODone (DESYREL) 50 MG tablet  valACYclovir (VALTREX) 1000 mg tablet        Past Medical History:    Past Medical History:   Diagnosis Date    Depressive disorder     Generalized anxiety disorder 5/15/2020       Past Surgical History:    Past Surgical History:   Procedure Laterality Date    CHOLECYSTECTOMY            Physical Exam   Patient Vitals for the past 24 hrs:   BP Temp Temp src Pulse Resp SpO2   04/01/25 1542 (!) 140/77 97  F (36.1  C) Temporal 88 16 97 %        Physical Exam  GENERAL: Awake, alert  CARDIOVASCULAR: Normal peripheral perfusion  LUNGS: Breathing comfortably on room air  ABDOMEN: Nondistended   EXTREMITIES: No peripheral edema  Skin: Superficial wounds to left wrist that are not gaping, no active bleeding, no surrounding erythema  or purulence  NEURO: Awake and alert, moves all extremities   Psych: Cooperative, reports suicidal ideation with plan to cut herself    Emergency Department Course     Imaging:  No orders to display       Laboratory:  Labs Ordered and Resulted from Time of ED Arrival to Time of ED Departure - No data to display       Emergency Department Course & Assessments:    Interventions:  Medications   acetaminophen (TYLENOL) tablet 650 mg (has no administration in time range)   ibuprofen (ADVIL/MOTRIN) tablet 600 mg (has no administration in time range)   hydrOXYzine HCl (ATARAX) tablet 25 mg (has no administration in time range)   hydrOXYzine HCl (ATARAX) tablet 25 mg (has no administration in time range)   lamoTRIgine (LaMICtal) tablet 150 mg (has no administration in time range)   traZODone (DESYREL) tablet 300 mg (has no administration in time range)   clonazePAM (klonoPIN) tablet 1 mg (has no administration in time range)   OLANZapine zydis (zyPREXA) ODT tab 5 mg (has no administration in time range)   hydrOXYzine HCl (ATARAX) tablet 50 mg (has no administration in time range)   prazosin (MINIPRESS) capsule 2 mg (has no administration in time range)           [facility]    Impression & Plan       Medical Decision Makin-year-old female with history of depression who presents for evaluation of suicidal ideation.  She has superficial lacerations on her left wrist, no active bleeding or signs of infection.  She is voluntary and cooperative, I ordered her home medications, she is stable for transfer to empath unit for DEC assessment.  Diagnosis:    ICD-10-CM    1. Depression, unspecified depression type  F32.A            Discharge Medications:  New Prescriptions    No medications on file                 Goldie Marc MD  [phone]

## 2025-04-01 NOTE — ED NOTES
[facility]  ED to EMPATH Checklist:      Goal for EMPATH: Depression management and Suicidality    Current Behavior: Sad    Safety Concerns: Suicidal, with a plan to    Legal Hold Status: Voluntary    Medically Cleared by ED provider: Yes    Patient Therapeutically Searched: Therapeutic search by ED staff (strings, belts, shoes, pockets, electronics, etc.)    Belongings: Remain with patient    Independent Ambulation at Baseline: Yes/No: Yes    Participates in Care/Conversation: Yes/No: Yes    Patient Informed about EMPATH: Yes/No: Yes    DEC: Ordered and pending    Patient Ready to be Transferred to EMPATH? Yes/No: Yes

## 2025-04-01 NOTE — ED TRIAGE NOTES
Pt comes in for mental health suicide issues   Pt has a plan to use a knife to cut her wrist   Pt is on a hold from a group home

## 2025-04-02 ENCOUNTER — TELEPHONE (OUTPATIENT)
Dept: BEHAVIORAL HEALTH | Facility: CLINIC | Age: 40
End: 2025-04-02
Payer: MEDICARE

## 2025-04-02 ENCOUNTER — TELEPHONE (OUTPATIENT)
Dept: BEHAVIORAL HEALTH | Facility: CLINIC | Age: 40
End: 2025-04-02

## 2025-04-02 PROCEDURE — 250N000013 HC RX MED GY IP 250 OP 250 PS 637: Performed by: NURSE PRACTITIONER

## 2025-04-02 PROCEDURE — 81025 URINE PREGNANCY TEST: CPT | Performed by: PSYCHIATRY & NEUROLOGY

## 2025-04-02 PROCEDURE — 250N000013 HC RX MED GY IP 250 OP 250 PS 637: Performed by: PSYCHIATRY & NEUROLOGY

## 2025-04-02 PROCEDURE — 80307 DRUG TEST PRSMV CHEM ANLYZR: CPT | Performed by: PSYCHIATRY & NEUROLOGY

## 2025-04-02 PROCEDURE — 250N000013 HC RX MED GY IP 250 OP 250 PS 637: Performed by: EMERGENCY MEDICINE

## 2025-04-02 PROCEDURE — G0378 HOSPITAL OBSERVATION PER HR: HCPCS

## 2025-04-02 PROCEDURE — 99223 1ST HOSP IP/OBS HIGH 75: CPT | Mod: AI | Performed by: PSYCHIATRY & NEUROLOGY

## 2025-04-02 RX ORDER — OLANZAPINE 7.5 MG/1
15 TABLET, FILM COATED ORAL AT BEDTIME
Status: DISCONTINUED | OUTPATIENT
Start: 2025-04-02 | End: 2025-04-03 | Stop reason: HOSPADM

## 2025-04-02 RX ADMIN — LAMOTRIGINE 150 MG: 150 TABLET ORAL at 09:34

## 2025-04-02 RX ADMIN — OLANZAPINE 15 MG: 7.5 TABLET, FILM COATED ORAL at 22:46

## 2025-04-02 RX ADMIN — OLANZAPINE 5 MG: 5 TABLET, FILM COATED ORAL at 09:35

## 2025-04-02 RX ADMIN — CLONAZEPAM 1 MG: 1 TABLET ORAL at 09:33

## 2025-04-02 RX ADMIN — METFORMIN HYDROCHLORIDE 1000 MG: 500 TABLET ORAL at 18:05

## 2025-04-02 RX ADMIN — PRAZOSIN HYDROCHLORIDE 2 MG: 2 CAPSULE ORAL at 22:45

## 2025-04-02 RX ADMIN — OLANZAPINE 5 MG: 5 TABLET, FILM COATED ORAL at 14:18

## 2025-04-02 RX ADMIN — DOCUSATE SODIUM 100 MG: 100 CAPSULE, LIQUID FILLED ORAL at 20:54

## 2025-04-02 RX ADMIN — DOCUSATE SODIUM 100 MG: 100 CAPSULE, LIQUID FILLED ORAL at 09:32

## 2025-04-02 RX ADMIN — TRAZODONE HYDROCHLORIDE 300 MG: 150 TABLET ORAL at 22:46

## 2025-04-02 RX ADMIN — CLONAZEPAM 1 MG: 1 TABLET ORAL at 14:18

## 2025-04-02 RX ADMIN — LAMOTRIGINE 150 MG: 150 TABLET ORAL at 20:56

## 2025-04-02 RX ADMIN — CLONAZEPAM 1 MG: 1 TABLET ORAL at 20:54

## 2025-04-02 RX ADMIN — METFORMIN HYDROCHLORIDE 1000 MG: 500 TABLET ORAL at 09:34

## 2025-04-02 RX ADMIN — LEVOTHYROXINE SODIUM 12.5 MCG: 25 TABLET ORAL at 09:31

## 2025-04-02 ASSESSMENT — COLUMBIA-SUICIDE SEVERITY RATING SCALE - C-SSRS
1. SINCE LAST CONTACT, HAVE YOU WISHED YOU WERE DEAD OR WISHED YOU COULD GO TO SLEEP AND NOT WAKE UP?: YES
ATTEMPT SINCE LAST CONTACT: NO
TOTAL  NUMBER OF ABORTED OR SELF INTERRUPTED ATTEMPTS SINCE LAST CONTACT: NO
5. HAVE YOU STARTED TO WORK OUT OR WORKED OUT THE DETAILS OF HOW TO KILL YOURSELF? DO YOU INTEND TO CARRY OUT THIS PLAN?: YES
6. HAVE YOU EVER DONE ANYTHING, STARTED TO DO ANYTHING, OR PREPARED TO DO ANYTHING TO END YOUR LIFE?: NO
2. HAVE YOU ACTUALLY HAD ANY THOUGHTS OF KILLING YOURSELF?: YES
SUICIDE, SINCE LAST CONTACT: NO
TOTAL  NUMBER OF INTERRUPTED ATTEMPTS SINCE LAST CONTACT: NO

## 2025-04-02 ASSESSMENT — ACTIVITIES OF DAILY LIVING (ADL)
ADLS_ACUITY_SCORE: 56

## 2025-04-02 NOTE — CONSULTS
"Diagnostic Evaluation Consultation  Crisis Assessment    Patient Name: Misty Parham  Age:  39 year old  Legal Sex: female  Gender Identity: female  Pronouns:   Race: White  Ethnicity: Not  or   Language: English      Patient was assessed: In person   Crisis Assessment Start Date: 04/01/25  Crisis Assessment Start Time: 05:20PM  Crisis Assessment Stop Time: 06:00PM  Patient location: [facility] Emergency Dept                             [facility]    Referral Data and Chief Complaint  Misty Parham presents to the ED via EMS. Patient is presenting to the ED for the following concerns: Depression, Suicidal ideation. Factors that make the mental health crisis life threatening or complex are: Pt presents to the ED after attending her DBT group, where her DBT therapist called EMS to bring pt into the ED due to increased suicidal ideations with a plan to cut her wrists. Pt has superficial cuts on both wrists that she reports were made with a butter knife as that's all that she has access to in her current group home. Pt reports that she has been planning on how to obtain a  and reports that she has been searching for a knife and is planning on saving up money to purchase a . Pt reports passive SI at baseline but reports that 3 weeks ago her SI increased due to feeling isolated in her group home, missing her children and ongoing issues with depression with no relief from symptoms. Pt appears flat throughout assessment. Pt reports to nursing staff that her \"angels are getting louder\" and telling her to end her life. Pt denies any AH/VH with writer. Pt reports that her SI is daily and that she has little ability to control thoughts. Per collateral at pt's group hope, pt has been making frequent comments about wanting to complete suicide which have been increasing recently. Pt is currently on a mental health commitment through [facility] and has a history of multiple past MH " "commitments..      Informed Consent and Assessment Methods  Explained the crisis assessment process, including applicable information disclosures and limits to confidentiality, assessed understanding of the process, and obtained consent to proceed with the assessment.  Assessment methods included conducting a formal interview with patient, review of medical records, collaboration with medical staff, and obtaining relevant collateral information from family and community providers when available.  : done     History of the Crisis   Pt was last seen on [facility] in January of 2024 and a petition for commitment was filed. The state supported the commitment and pt was placed back on commitment. Pt reports that her commit was just renewed and she continues to remain on a commitment. A note from 2/9/2024 in pt's chart states \"Patient has very severe mental illness with many past psychiatric hospitalizations, multiple courses of ECT including a [facility] order with commitments in the past.\" Pt reports multiple hospitalizations in the past, attending an IRTS facility for the past year and reports multiple attempted overdoses in the past. Pt reports that she does not currently have access to her medication in her current group home and thought of a different method to harm herself. Pt reports a engaging in self harm in the past and reports that she just recently began cutting herself again. Pt reports a MH history of MDD, anxiety and Borderline Personality Disorder.    Brief Psychosocial History  Family:  Single, Children yes  Support System:  Parent(S), Children, Facility resident(s)/Staff  Employment Status:  unemployed  Source of Income:  unable to assess  Financial Environmental Concerns:  unemployed  Current Hobbies:  other (see comments) (unable to assess due to pt's current presentation)  Barriers in Personal Life:  mental health concerns    Significant Clinical History  Current Anxiety Symptoms:  racing thoughts, " "excessive worry, anxious  Current Depression/Trauma:  difficulty concentrating, withdrawl/isolation, negativistic, impaired decision making, thoughts of death/suicide, excessive guilt, sadness, hopelessness, helplessness  Current Somatic Symptoms:  anxious  Current Psychosis/Thought Disturbance:  inattentive, distractability  Current Eating Symptoms:     Chemical Use History:  Alcohol: None  Benzodiazepines: None  Opiates: None  Cocaine: None  Marijuana: None  Other Use: None   Past diagnosis:  Anxiety Disorder, Personality Disorder, Depression  Family history:     Past treatment:  Individual therapy, Civil Commitment, Primary Care, Case management, Probation/Court ordered treatment, Inpatient Hospitalization, Supportive Living Environment (group home, half-way house, etc), Other  Details of most recent treatment:  Currently residing in group home, attending DBT therapy and in on a MI commitment  Other relevant history:       Have there been any medication changes in the past two weeks:  no       Is the patient compliant with medications:  yes        Collateral Information  Is there collateral information: Yes     Collateral information name, relationship, phone number:  Nasreen Fayette luiz Parry, [phone] nurse at [facility]. Writer also left a VM for [facility] Commitment worker Marleny at [phone]    What happened today: Reports that pt has been \"telling everyone that she wants to cut her wrists and end her life.\" Reports that today specifically, pt was at a DBT group and they referred her into the ED to be seen.     What is different about patient's functioning: Reports that pt has just been \"very sad.\" Reports that she frequently talks about ending her life and not wanting to live on earth any longer. Reports that pt has only been at this group home for a few months, so unsure of pt's baseline.     What do you think the patient needs:      Has patient made comments about wanting to kill " themselves/others: yes    If d/c is recommended, can they take part in safety/aftercare planning:  yes        Risk Assessment  Rowan Suicide Severity Rating Scale Full Clinical Version:  Suicidal Ideation  Q1 Wish to be Dead (Lifetime): Yes  Q2 Non-Specific Active Suicidal Thoughts (Lifetime): Yes  3. Active Suicidal Ideation with any Methods (Not Plan) Without Intent to Act (Lifetime): No  4. Active Suicidal Ideation with Some Intent to Act, Without Specific Plan (Lifetime): Yes  5. Active Suicidal Ideation with Specific Plan and Intent (Lifetime): Yes  Q6 Suicide Behavior (Lifetime): yes  Intensity of Ideation (Lifetime)  Most Severe Ideation Rating (Lifetime): 5  Frequency (Lifetime): Many times each day  Duration (Lifetime): More than 8 hours/persistent or continuous  Controllability (Lifetime): Unable to control thoughts  Deterrents (Lifetime): Deterrents most likely did not stop you  Reasons for Ideation (Lifetime): Mostly to end or stop the pain (You couldn't go on living with the pain or how you were feeling)  Suicidal Behavior (Lifetime)  Actual Attempt (Lifetime): Yes  Total Number of Actual Attempts (Lifetime): 2  Has subject engaged in non-suicidal self-injurious behavior? (Lifetime): Yes  Aborted or Self-Interrupted Attempt (Lifetime): No  Preparatory Acts or Behavior (Lifetime): Yes  Total Number of Preparatory Acts (Lifetime): 3  Preparatory Acts or Behavior Description (Lifetime): Searching for medication, searching/saving up money for a knife, writing goodbye messages in the past    Rowan Suicide Severity Rating Scale Recent:   Suicidal Ideation (Recent)  Q1 Wished to be Dead (Past Month): yes  Q2 Suicidal Thoughts (Past Month): yes  Q3 Suicidal Thought Method: yes  Q4 Suicidal Intent without Specific Plan: no  Q5 Suicide Intent with Specific Plan: no  Level of Risk per Screen: moderate risk  Intensity of Ideation (Recent)  Most Severe Ideation Rating (Past 1 Month): 5  Frequency (Past 1  Month): Daily or almost daily  Duration (Past 1 Month): 1-4 hours/a lot of time  Controllability (Past 1 Month): Unable to control thoughts  Deterrents (Past 1 Month): Deterrents probably stopped you  Reasons for Ideation (Past 1 Month): Completely to end or stop the pain (You couldn't go on living with the pain or how you were feeling)  Suicidal Behavior (Recent)  Actual Attempt (Past 3 Months): No  Has subject engaged in non-suicidal self-injurious behavior? (Past 3 Months): Yes  Interrupted Attempts (Past 3 Months): No  Aborted or Self-Interrupted Attempt (Past 3 Months): No  Preparatory Acts or Behavior (Past 3 Months): Yes  Total Number of Preparatory Acts (Past 3 Months): 1  Preparatory Acts or Behavior Description (Past 3 Months): Searching for a knife/something sharp in the group home    Environmental or Psychosocial Events: legal issues such as DWI, DUI, lawsuit, CPS involvement, etc., challenging interpersonal relationships, impulsivity/recklessness, helplessness/hopelessness, other life stressors, neither working nor attending school, social isolation  Protective Factors: Protective Factors: strong bond to family unit, community support, or employment, lives in a responsibly safe and stable environment, good treatment engagement, help seeking    Does the patient have thoughts of harming others? Feels Like Hurting Others: no  Previous Attempt to Hurt Others: no  Is the patient engaging in sexually inappropriate behavior?: no  Does Patient have a known history of aggressive behavior: No    Is the patient engaging in sexually inappropriate behavior?  no        Mental Status Exam   Affect: Flat  Appearance: Appropriate  Attention Span/Concentration: Attentive  Eye Contact: Engaged    Fund of Knowledge: Appropriate   Language /Speech Content: Non-Fluent  Language /Speech Volume: Soft  Language /Speech Rate/Productions: Normal  Recent Memory: Intact  Remote Memory: Intact  Mood: Depressed, Sad  Orientation to  Person: Yes   Orientation to Place: Yes  Orientation to Time of Day: Yes  Orientation to Date: Yes     Situation (Do they understand why they are here?): Yes  Psychomotor Behavior: Underactive  Thought Content: Suicidal  Thought Form: Intact          Medication  Psychotropic medications:   Medication Orders - Psychiatric (From admission, onward)      Start     Dose/Rate Route Frequency Ordered Stop    04/02/25 0800  OLANZapine zydis (zyPREXA) ODT tab 5 mg         5 mg Oral 2 TIMES DAILY 04/01/25 1611      04/01/25 2200  traZODone (DESYREL) tablet 300 mg         300 mg Oral AT BEDTIME 04/01/25 1611 04/01/25 2000  hydrOXYzine HCl (ATARAX) tablet 50 mg         50 mg Oral 2 TIMES DAILY 04/01/25 1611 04/01/25 1607  hydrOXYzine HCl (ATARAX) tablet 25 mg         25 mg Oral AT BEDTIME PRN 04/01/25 1607      04/01/25 1607  hydrOXYzine HCl (ATARAX) tablet 25 mg         25 mg Oral EVERY 4 HOURS PRN 04/01/25 1607               Current Care Team  Patient Care Team:  [facility] Cinthya Pipestone as PCP - General  Jose Juan Aquino [facility] as Pharmacist (Pharmacist)  Jelena Mackey MD as Assigned PCP  Dinesh Renee OD as Assigned Surgical Provider    Diagnosis  Patient Active Problem List   Diagnosis Code    Depression F32.A    Suicidal ideation R45.851    Depression with suicidal ideation F32.A, R45.851    Major depression F32.9    Suicidal behavior R45.89    Facial cellulitis L03.211    Auditory hallucination R44.0    Psychosis, unspecified psychosis type (H) F29    Morbid obesity (H) E66.01    Chronic insomnia F51.04    Generalized anxiety disorder F41.1    COVID-19 U07.1    Borderline personality disorder (H) F60.3       Primary Problem This Admission  There are no active hospital problems to display for this patient.      Clinical Summary and Substantiation of Recommendations   Clinical Substantiation:  At time of assessment, pt appears flat and reports increased suicidal ideations over the past few weeks,  "with a plan to cut herself with a . Pt reports a history of suicide attempts and is currently on a MI commitment through [facility] with  Marleny [phone]. Pt has undergone multiple courses of ECT in the past including a [facility] order. Pt currently resides in a group home and per collateral from group home staff, pt has been making frequent comments about wanting to die. Today, pt was at her DBT group and reported to staff that she was actively suicidal. Her therapist called EMS to bring her into the ED. Pt denied AH/VH with writer but stated to nursing staff that her \"angels are getting louder\", stating that she hears angels when she gets depressed that want her to die. Pt may benefit from inpatient mental health treatment at this time, but states that she is unsure if she would be willing to do inpatient. Consult with attending provider is still in progress.    Goals for crisis stabilization:  Reduce suicidal ideation    Next steps for Care Team:  Assess pt's appropriateness for observation/inpatient, consult with Marleny silverio     Treatment Objectives Addressed:  rapport building, safety planning, assessing safety    Therapeutic Interventions:  Identified and practiced coping skills.    Has a specific means been identified for suicidal/homicide actions: Yes    If yes, describe:  cutting self with a     Explain action steps toward mitigation:  Pt does not currently have access to a     Document completion of mitigation actions:  Pt reports no access to sharp objects, group home staff also stated that pt does not have access    The follow up action still needed prior to discharge:  Safety planning with group home staff    Patient coping skills attempted to reduce the crisis:  Attending DBT group and speaking with therapist    Disposition  Recommended referrals: Programmatic Care, Medication Management, Crisis Services        Reviewed case and " recommendations with attending provider. Attending Name: Consult still in progress       Attending concurs with disposition: yes       Patient and/or validated legal guardian concurs with disposition:   yes       Final disposition:  observation       Legal status: Voluntary/Patient has signed consent for treatment                                                                                                                     Reviewed court records: yes       Assessment Details   Total duration spent with the patient: 40 min     CPT code(s) utilized: 75833 - Psychotherapy for Crisis - 60 (30-74*) min    Isabel Osman Psychotherapist  [facility] - Triage & Transition Services  Callback: [phone]

## 2025-04-02 NOTE — ED NOTES
"39 year old female with history of depression, self cutting, borderline personality disorder received from ED due to suicidal ideation and urges to self harm. Reports that she was sent from DBT as she was having thoughts of cutting and was experiencing suicidal ideation. Endorses SI, with a plan to overdose and urges to self harm by cutting. Pt was unable to contract for safety regarding self harm urges. Denies HI. Pt was asked if she hears voices and she stated \"yes my angels\". Pt reports that she hears angels talking to her and when she is feeling \"more down, more tired\" the voices tend to be louder and tell her \"it's time to die\". Pt reports that she is overwhelmed by the voices      Nursing and risk assessments completed. Assessments reviewed with LMHP and physician. Admission information reviewed with patient. Patient given a tour of [facility] and instructions on using the facility. Questions regarding [facility] addressed. Pt safety search completed.    "

## 2025-04-02 NOTE — ED NOTES
Patient approached writer to report she has been having SIB urges.She reported feeling urges to pick at the scabs on her left wrist. Her feelings were acknowledged and redirection was provided. Patient was able to identify three ways she could reframed her thinking and distract herself from the urges. She verbally contracts for safety. Nursing will continue to assess for safety.

## 2025-04-02 NOTE — TELEPHONE ENCOUNTER
S: SYED Hassan  Lacie  calling at 5:41 PM about a 39 year old/Female presenting with SI with a plan     B: Pt arrived via EMS. Presenting problem, stressors: Pt came to the ED yesterday with SI with plan and intent to cut her wrist with a .  Pt stayed the night in [facility].  Today, pt continues to endorse SI with plan and intent.  Pt endorses command AH- voices telling her it is OK to die and she will become an anmol.  Pt reports voices are also telling pt to self harm.  Stressor- Pt was put in the middle of an argument at her group home between staff and her friend.      Pt affect in ED: Blunted and Flat  Pt Dx: Major Depressive Disorder, Generalized Anxiety Disorder, and Borderline Personality Disorder  Previous On license of [facility] hx? Yes: [facility] 2021.    Pt endorses SI with a plan to cut wrist with a     Hx of suicide attempt? Yes: 2 previous attempts.    Pt endorses SIB via cutting, most recent episode yesterday  Pt denies HI   Pt endorses auditory hallucinations .   Pt RARS Score: 3    Hx of aggression/violence, sexual offenses, legal concerns, Epic care plan? describe: No  Current concerns for aggression this visit? No  Does pt have a history of Civil Commitment? Yes, currently on MI civil commitment  Is Pt their own guardian? Yes    Pt is prescribed medication. Is patient medication compliant? Yes  Pt endorses OP services:  through [facility], DBT worker.   CD concerns: None  Acute or chronic medical concerns: No  Does Pt present with specific needs, assistive devices, or exclusionary criteria? None      Pt is ambulatory  Pt is able to perform ADLs independently      A: Pt to be reviewed for On license of [facility] admission. Pt is Voluntary  Preferred placement: [facility]    COVID Symptoms: No  If yes, COVID test required   Utox: Not ordered, intake to request lab    CMP: N/A  CBC: N/A  HCG: Ordered, not yet collected    R: Patient cleared and ready for behavioral bed placement: Yes  Pt placed on On license of [facility]  "worklist? Yes    Does Patient need a Transfer Center request created? Yes, writer completed Transfer Center request at:  5:50 PM    R: 9:00 PM Pt discussed with CRN on unit 30 as they are currently case by case for admissions.  CRN reports pt looks appropriate if pt is able to contract for safety while at the hospital    Per pt's RN, pt is bradnt for safety in the hospital    Paged provider Alexandra to present pt for [facility]    R: 9:25 PM provider Alexandra accepts pt for [facility] stating pt \"accepted to [facility] private room with [facility] staff    9:30 PM per discussion with CRN they will contact staffing to see if they are able to get staff to take pt on night shift.  [facility] phone number provided to unit 30, they will call report when they are staffed appropriate for the admission.    9:35 PM ED updated with pt placement.    9:48 PM message received from provider Alexandra stating we should make sure placement is appropriate on unit 30, as pt weighs 316 pounds and some rooms have toilets with weight limits.    9:50 PM called unit 30 and spoke to CRN to make sure the weight limit on the toilet is more than pt's weight.  CRN spoke to another note and stated their toilets can hold up to 400 pounds and that is not a problem.    Pt remains in queue for unit 30/[facility].     "

## 2025-04-02 NOTE — ED PROVIDER NOTES
EmPATH Unit - Initial Psychiatric Observation Note  [facility] Emergency Department  Observation Initiation Date: Apr 1, 2025    Misty Parham MRN: [id number]   Age: 39 year old YOB: 1985     History     Chief Complaint   Patient presents with    Suicidal     HPI  Misty Parham is a 39 year old female with a past history notable for major depressive disorder, borderline personality disorder, and generalized anxiety disorder with a history of psychosis who presents to the emergency department for evaluation of suicidal ideation and intense urges to engage in self-injurious behavior.  She had reported symptom intensity to the therapist at her treatment program and was referred to the emergency room for further evaluation.  She was determined to be medically stable and transferred to the EmPATH unit for psychiatric assessment.  She is now approaching 23 hours in the emergency department.  On examination, the patient was not able to identify a specific psychosocial stressor as being related to recent symptom worsening.  She did identify worsening auditory hallucinations which often prompted suicidal ideation and urges toward self-injurious behavior.  She characterized this intensity as being severe, often feeling consumed by the content of hallucinations, and finding it difficult to refrain from acting on them.  Thinking about her children has helped to lessen the likelihood of engaging in self-injurious behavior however, as symptoms have intensified, this has become less effective.  Today, she expressed a desire to take a knife and cut deeply into her arm.  Suicidal thoughts are reported to be active.  Auditory hallucinations persist today.  She identified her mood as being depressed however is not currently taking an antidepressant medication.  She notes recent medication changes involved increasing the dose of lamotrigine and trazodone.  With those adjustments, sleep is slightly improved however she  "continues to struggle with insomnia.  There was no indication of homicidal ideation.  She is agreeable with her treatment plan.    Past Medical History  Past Medical History:   Diagnosis Date    Depressive disorder     Generalized anxiety disorder 5/15/2020     Past Surgical History:   Procedure Laterality Date    CHOLECYSTECTOMY       clonazePAM (KLONOPIN) 1 MG tablet  docusate sodium (COLACE) 100 MG capsule  lamoTRIgine (LAMICTAL) 150 MG tablet  levothyroxine (SYNTHROID/LEVOTHROID) 25 MCG tablet  metFORMIN (GLUCOPHAGE) 1000 MG tablet  miconazole (MICATIN) 2 % external powder  OLANZapine (ZYPREXA) 10 MG tablet  OLANZapine (ZYPREXA) 5 MG tablet  Omega-3 Fish Oil 500 MG capsule  prazosin (MINIPRESS) 2 MG capsule  traZODone (DESYREL) 150 MG tablet  divalproex sodium extended-release (DEPAKOTE ER) 500 MG 24 hr tablet  hydrOXYzine HCl (ATARAX) 50 MG tablet  lithium (ESKALITH) 300 MG tablet  polyethylene glycol (MIRALAX) 17 GM/Dose powder  traZODone (DESYREL) 50 MG tablet  valACYclovir (VALTREX) 1000 mg tablet      No Known Allergies  Family History  Family History   Problem Relation Age of Onset    Diabetes Father      Social History   Social History     Tobacco Use    Smoking status: Never     Passive exposure: Current    Smokeless tobacco: Never   Vaping Use    Vaping status: Never Used   Substance Use Topics    Alcohol use: Not Currently    Drug use: Never          Review of Systems  A medically appropriate review of systems was performed with pertinent positives and negatives noted in the HPI, and all other systems negative.    Physical Examination   BP: (!) 140/77  Pulse: 88  Temp: 97  F (36.1  C)  Resp: 16  Height: 165.1 cm (5' 5\")  Weight: (!) 143.7 kg (316 lb 14.4 oz)  SpO2: 97 %    Physical Exam  General: Appears stated age.   Neuro: Alert and fully oriented. Extremities appear to demonstrate normal strength on visual inspection.   Integumentary/Skin: no rash visualized, normal color    Psychiatric Examination "   Appearance: awake, alert  Attitude:  cooperative  Eye Contact:  fair  Mood:  depressed  Affect:  intensity is flat  Speech:  decreased prosody  Psychomotor Behavior:  no evidence of tardive dyskinesia, dystonia, or tics  Thought Process:  linear  Associations:  no loose associations  Thought Content:  active suicidal ideation present and auditory hallucinations present  Insight:  fair  Judgement:  fair  Oriented to:  time, person, and place  Attention Span and Concentration:  limited  Recent and Remote Memory:  fair  Language: able to name/identify objects without impairment  Fund of Knowledge: intact with awareness of current and past events    ED Course        Labs Ordered and Resulted from Time of ED Arrival to Time of ED Departure - No data to display    Assessments & Plan (with Medical Decision Making)   Patient presenting with recent worsening of auditory hallucinations prompting self-injurious behavior and influencing suicidal ideation.  Her mood has been depressed although there are no specific psychosocial stressors noted to be contributory.  Recent medication changes have involved increasing lamotrigine and trazodone with slight improvement.  Auditory hallucination continues to be the primary symptom of concern.  Her treatment plan focused on optimizing antipsychotic medications while pursuing psychiatric hospitalization for further treatment and stabilization. Nursing notes reviewed noting no acute issues.     I have reviewed the assessment completed by the [facility].     During the observation period, the patient did not require medications for agitation, and did not require restraints/seclusion for patient and/or provider safety.     The patient was found to have a psychiatric condition that would benefit from an observation stay in the emergency department for further psychiatric stabilization and/or coordination of a safe disposition. The observation plan includes serial assessments of psychiatric  condition, potential administration of medications if indicated, further disposition pending the patient's psychiatric course during the monitoring period.     Preliminary diagnosis:    ICD-10-CM    1. Severe episode of recurrent major depressive disorder, with psychotic features (H)  F33.3       2. Auditory hallucination  R44.0       3. Borderline personality disorder (H)  F60.3       4. Generalized anxiety disorder  F41.1       5. Suicidal ideation  R45.851            Treatment Plan:  -Increase Zyprexa from 10 mg nightly to 15 mg nightly to optimize antipsychotic interventions.  Continue her daytime dosing without change.  -Continue lamotrigine 150 mg twice a day for management of affective instability  -Consider adding an antidepressant to better address depressive symptoms  -Urine drug screen and pregnancy test have been ordered  -Enter to observation status as we await bed availability to transfer to inpatient psychiatry for further treatment and stabilization.    --  Baron Laughlin MD   [facility] EMERGENCY DEPT  EmPATH Unit       Baron Laughlin MD  04/02/25 [id number]

## 2025-04-02 NOTE — PROGRESS NOTES
Patient in the recliner resting with eyes closed. She was easily arousable to complete bedtime medications. Patient got up once to use the bathroom and went right back to sleep. She was frequently monitored and no acute occurrence this night.

## 2025-04-02 NOTE — TELEPHONE ENCOUNTER
They were calling to get an update about the status of the patient. Transfered to the attending nurse.

## 2025-04-02 NOTE — ED NOTES
Rounded on patient to check- in. Patient was found to be sitting in recliner eating a pizza. She tells this writer that she continues to hear the voices and describes them as having increased in intensity. Remains flat in affect. Continues to endorse chronic SI without a specific plan. Has not engaged in any SIB today. She is awaiting IP bed placement.

## 2025-04-02 NOTE — PROGRESS NOTES
"Patient woke up this morning at about 0900, ate breakfast and had her morning medications. She presents with a flat affect. Still endorses SI with plan. Only finger foods recommended at this time. Patient met with [facility] this morning and patient still endorses MH sx with command AH. She mentioned to the therapist that she has been picking behind her ears. I assessed the ear and patient has about 4 bruised spots behind the ear arround ear lobe. When asked if this happened this morning, she responded \"No, I have been doing this before coming here. She was encouraged to continue with the coping mechanism of chewing ice and use head phones for distraction.    "

## 2025-04-02 NOTE — ED NOTES
This writer went to check on patient and patient gave the writer a plastic knife and stated that she cut herself. Pt showed this writer the cuts which are to patients L wrist, multiple red marks are noted with a pinpoint area that is open. Provider was notified and a finger food tray was ordered for patient. Pt denied needing any bandages, took medications and was going to bed.

## 2025-04-02 NOTE — ED NOTES
IP MH Referral Acuity Rating Score (RARS)    LMHP complete at referral to IP MH, with DEC; and, daily while awaiting IP MH placement. Call score to PPS.  CRITERIA SCORING   New 70 HH and Involuntary for IP MH (not adolescent) 0/3   Boarding over 24 hours 0/1   Vulnerable adult at least 55+ with multiple co morbidities; or, Patient age 11 or under 0/1   Suicide ideation without relief of precipitating factors 1/1   Current plan for suicide 1/1   Current plan for homicide 0/1   Imminent risk or actual attempt to seriously harm another without relief of factors precipitating the attempt 0/1   Severe dysfunction in daily living (ex: complete neglect for self care, extreme disruption in vegetative function, extreme deterioration in social interactions) 1/1   Recent (last 2 weeks) or current physical aggression in the ED 0/1   Restraints or seclusion episode in ED 0/1   Verbal aggression, agitation, yelling, etc., while in the ED 0/1   Active psychosis with psychomotor agitation or catatonia 0/1   Need for constant or near constant redirection (from leaving, from others, etc).  0/1   Intrusive or disruptive behaviors 0/1   TOTAL 3       nsg: patient is alert and oriented x2-3. denies pain or discomfort at this time. no side 
effect noted after zyprexa im given. call light w/in reach.

## 2025-04-02 NOTE — PLAN OF CARE
"Misty Parham  April 1, 2025  Plan of Care Hand-off Note     Patient Recommended Care Path: observation, possibly inpatient but pt not in agreement with inpatient at this time. Unsure if pt would be eligible for a hold as consult is still in progress    Clinical Substantiation:  At time of assessment, pt appears flat and reports increased suicidal ideations over the past few weeks, with a plan to cut herself with a . Pt reports a history of suicide attempts and is currently on a MI commitment through [facility] with  Marleny [phone]. Pt has undergone multiple courses of ECT in the past including a [facility] order. Pt currently resides in a group home and per collateral from group home staff, pt has been making frequent comments about wanting to die. Today, pt was at her DBT group and reported to staff that she was actively suicidal. Her therapist called EMS to bring her into the ED. Pt denied AH/VH with writer but stated to nursing staff that her \"angels are getting louder\", stating that she hears angels when she gets depressed that want her to die. Pt may benefit from inpatient mental health treatment at this time, but states that she is unsure if she would be willing to do inpatient. Consult with attending provider is still in progress.     Goals for crisis stabilization:  Reduce suicidal ideation    Next steps for Care Team:  Assess pt's appropriateness for observatoin/inpatient, consult with Marleny reyes     Treatment Objectives Addressed:  rapport building, safety planning, assessing safety    Therapeutic Interventions:  Identified and practiced coping skills.    Has a specific means been identified for suicidal.homicide actions: Yes  If yes, describe: cutting self with a   Explain action steps toward mitigation: Pt does not currently have access to a   Document completion of mitigation action: Pt reports no access to sharp objects, group " home staff also stated that pt does not have access  The follow up action still needed prior to discharge: Saftey planning with group home staff    Patient coping skills attempted to reduce the crisis:  Attending DBT group and speaking with therapist    Collateral contact information:  Nasreen Betsy Layne luiz Parry, [phone] nurse at retirement. Writer also left a VM for [facility] Committment worker Marleny at [phone]    Legal Status: Voluntary/Patient has signed consent for treatment                                                                                       Reviewed court records: yes     Psychiatry Consult:     JUNAID Posadas

## 2025-04-03 ENCOUNTER — HOSPITAL ENCOUNTER (INPATIENT)
Facility: CLINIC | Age: 40
End: 2025-04-03
Attending: PSYCHIATRY & NEUROLOGY | Admitting: PSYCHIATRY & NEUROLOGY
Payer: MEDICARE

## 2025-04-03 VITALS
TEMPERATURE: 98.3 F | RESPIRATION RATE: 16 BRPM | WEIGHT: 293 LBS | DIASTOLIC BLOOD PRESSURE: 84 MMHG | BODY MASS INDEX: 48.82 KG/M2 | HEART RATE: 102 BPM | OXYGEN SATURATION: 93 % | HEIGHT: 65 IN | SYSTOLIC BLOOD PRESSURE: 115 MMHG

## 2025-04-03 VITALS
BODY MASS INDEX: 48.82 KG/M2 | OXYGEN SATURATION: 93 % | WEIGHT: 293 LBS | HEIGHT: 65 IN | RESPIRATION RATE: 18 BRPM | TEMPERATURE: 97.4 F | DIASTOLIC BLOOD PRESSURE: 77 MMHG | HEART RATE: 105 BPM | SYSTOLIC BLOOD PRESSURE: 122 MMHG

## 2025-04-03 DIAGNOSIS — K59.00 CONSTIPATION, UNSPECIFIED CONSTIPATION TYPE: ICD-10-CM

## 2025-04-03 DIAGNOSIS — F60.3 BORDERLINE PERSONALITY DISORDER (H): ICD-10-CM

## 2025-04-03 DIAGNOSIS — S40.812A ABRASION OF LEFT UPPER EXTREMITY, INITIAL ENCOUNTER: ICD-10-CM

## 2025-04-03 DIAGNOSIS — S40.812A: Primary | ICD-10-CM

## 2025-04-03 DIAGNOSIS — F32.A DEPRESSION, UNSPECIFIED DEPRESSION TYPE: ICD-10-CM

## 2025-04-03 DIAGNOSIS — Z00.00 ENCOUNTER FOR MEDICARE ANNUAL WELLNESS EXAM: ICD-10-CM

## 2025-04-03 DIAGNOSIS — F41.1 GENERALIZED ANXIETY DISORDER: ICD-10-CM

## 2025-04-03 LAB
ALBUMIN SERPL BCG-MCNC: 3.9 G/DL (ref 3.5–5.2)
ALP SERPL-CCNC: 75 U/L (ref 40–150)
ALT SERPL W P-5'-P-CCNC: 27 U/L (ref 0–50)
ANION GAP SERPL CALCULATED.3IONS-SCNC: 10 MMOL/L (ref 7–15)
AST SERPL W P-5'-P-CCNC: 23 U/L (ref 0–45)
BASOPHILS # BLD AUTO: 0 10E3/UL (ref 0–0.2)
BASOPHILS NFR BLD AUTO: 1 %
BILIRUB SERPL-MCNC: 0.3 MG/DL
BUN SERPL-MCNC: 15.7 MG/DL (ref 6–20)
CALCIUM SERPL-MCNC: 9.5 MG/DL (ref 8.8–10.4)
CHLORIDE SERPL-SCNC: 102 MMOL/L (ref 98–107)
CHOLEST SERPL-MCNC: 133 MG/DL
CREAT SERPL-MCNC: 0.94 MG/DL (ref 0.51–0.95)
EGFRCR SERPLBLD CKD-EPI 2021: 78 ML/MIN/1.73M2
EOSINOPHIL # BLD AUTO: 0.8 10E3/UL (ref 0–0.7)
EOSINOPHIL NFR BLD AUTO: 9 %
ERYTHROCYTE [DISTWIDTH] IN BLOOD BY AUTOMATED COUNT: 14.7 % (ref 10–15)
EST. AVERAGE GLUCOSE BLD GHB EST-MCNC: 103 MG/DL
GLUCOSE SERPL-MCNC: 93 MG/DL (ref 70–99)
HBA1C MFR BLD: 5.2 %
HCO3 SERPL-SCNC: 27 MMOL/L (ref 22–29)
HCT VFR BLD AUTO: 40.9 % (ref 35–47)
HDLC SERPL-MCNC: 39 MG/DL
HGB BLD-MCNC: 13.1 G/DL (ref 11.7–15.7)
IMM GRANULOCYTES # BLD: 0 10E3/UL
IMM GRANULOCYTES NFR BLD: 0 %
LDLC SERPL CALC-MCNC: 69 MG/DL
LYMPHOCYTES # BLD AUTO: 2.5 10E3/UL (ref 0.8–5.3)
LYMPHOCYTES NFR BLD AUTO: 31 %
MCH RBC QN AUTO: 28.1 PG (ref 26.5–33)
MCHC RBC AUTO-ENTMCNC: 32 G/DL (ref 31.5–36.5)
MCV RBC AUTO: 88 FL (ref 78–100)
MONOCYTES # BLD AUTO: 0.6 10E3/UL (ref 0–1.3)
MONOCYTES NFR BLD AUTO: 8 %
NEUTROPHILS # BLD AUTO: 4.2 10E3/UL (ref 1.6–8.3)
NEUTROPHILS NFR BLD AUTO: 52 %
NONHDLC SERPL-MCNC: 94 MG/DL
NRBC # BLD AUTO: 0 10E3/UL
NRBC BLD AUTO-RTO: 0 /100
PLATELET # BLD AUTO: 292 10E3/UL (ref 150–450)
POTASSIUM SERPL-SCNC: 4.5 MMOL/L (ref 3.4–5.3)
PROT SERPL-MCNC: 7.1 G/DL (ref 6.4–8.3)
RBC # BLD AUTO: 4.67 10E6/UL (ref 3.8–5.2)
SODIUM SERPL-SCNC: 139 MMOL/L (ref 135–145)
TRIGL SERPL-MCNC: 123 MG/DL
TSH SERPL DL<=0.005 MIU/L-ACNC: 1.88 UIU/ML (ref 0.3–4.2)
WBC # BLD AUTO: 8.2 10E3/UL (ref 4–11)

## 2025-04-03 PROCEDURE — 82465 ASSAY BLD/SERUM CHOLESTEROL: CPT | Performed by: NURSE PRACTITIONER

## 2025-04-03 PROCEDURE — 85025 COMPLETE CBC W/AUTO DIFF WBC: CPT | Performed by: NURSE PRACTITIONER

## 2025-04-03 PROCEDURE — 124N000002 HC R&B MH UMMC

## 2025-04-03 PROCEDURE — 250N000013 HC RX MED GY IP 250 OP 250 PS 637: Performed by: PSYCHIATRY & NEUROLOGY

## 2025-04-03 PROCEDURE — 36415 COLL VENOUS BLD VENIPUNCTURE: CPT | Performed by: NURSE PRACTITIONER

## 2025-04-03 PROCEDURE — G0378 HOSPITAL OBSERVATION PER HR: HCPCS

## 2025-04-03 PROCEDURE — 250N000013 HC RX MED GY IP 250 OP 250 PS 637: Performed by: NURSE PRACTITIONER

## 2025-04-03 PROCEDURE — 99223 1ST HOSP IP/OBS HIGH 75: CPT | Mod: AI | Performed by: PSYCHIATRY & NEUROLOGY

## 2025-04-03 PROCEDURE — 83036 HEMOGLOBIN GLYCOSYLATED A1C: CPT | Performed by: NURSE PRACTITIONER

## 2025-04-03 PROCEDURE — 84443 ASSAY THYROID STIM HORMONE: CPT | Performed by: NURSE PRACTITIONER

## 2025-04-03 PROCEDURE — 80053 COMPREHEN METABOLIC PANEL: CPT | Performed by: NURSE PRACTITIONER

## 2025-04-03 RX ORDER — MAGNESIUM HYDROXIDE/ALUMINUM HYDROXICE/SIMETHICONE 120; 1200; 1200 MG/30ML; MG/30ML; MG/30ML
30 SUSPENSION ORAL EVERY 4 HOURS PRN
Status: DISCONTINUED | OUTPATIENT
Start: 2025-04-03 | End: 2025-04-24 | Stop reason: HOSPADM

## 2025-04-03 RX ORDER — OLANZAPINE 10 MG/1
10 TABLET, FILM COATED ORAL 2 TIMES DAILY PRN
Status: DISCONTINUED | OUTPATIENT
Start: 2025-04-03 | End: 2025-04-24 | Stop reason: HOSPADM

## 2025-04-03 RX ORDER — DOCUSATE SODIUM 100 MG/1
100 CAPSULE, LIQUID FILLED ORAL 2 TIMES DAILY
Status: DISCONTINUED | OUTPATIENT
Start: 2025-04-03 | End: 2025-04-24 | Stop reason: HOSPADM

## 2025-04-03 RX ORDER — TRAZODONE HYDROCHLORIDE 150 MG/1
300 TABLET ORAL AT BEDTIME
Status: DISCONTINUED | OUTPATIENT
Start: 2025-04-03 | End: 2025-04-24 | Stop reason: HOSPADM

## 2025-04-03 RX ORDER — ACETAMINOPHEN 325 MG/1
650 TABLET ORAL EVERY 4 HOURS PRN
Status: DISCONTINUED | OUTPATIENT
Start: 2025-04-03 | End: 2025-04-24 | Stop reason: HOSPADM

## 2025-04-03 RX ORDER — ARIPIPRAZOLE 5 MG/1
5 TABLET ORAL DAILY
Status: DISCONTINUED | OUTPATIENT
Start: 2025-04-03 | End: 2025-04-11

## 2025-04-03 RX ORDER — AMOXICILLIN 250 MG
1 CAPSULE ORAL 2 TIMES DAILY PRN
Status: DISCONTINUED | OUTPATIENT
Start: 2025-04-03 | End: 2025-04-24 | Stop reason: HOSPADM

## 2025-04-03 RX ORDER — OLANZAPINE 5 MG/1
5 TABLET, FILM COATED ORAL
Status: DISCONTINUED | OUTPATIENT
Start: 2025-04-03 | End: 2025-04-03

## 2025-04-03 RX ORDER — OLANZAPINE 10 MG/2ML
10 INJECTION, POWDER, FOR SOLUTION INTRAMUSCULAR 2 TIMES DAILY PRN
Status: DISCONTINUED | OUTPATIENT
Start: 2025-04-03 | End: 2025-04-24 | Stop reason: HOSPADM

## 2025-04-03 RX ORDER — HYDROXYZINE HYDROCHLORIDE 50 MG/1
50 TABLET, FILM COATED ORAL EVERY 4 HOURS PRN
Status: DISCONTINUED | OUTPATIENT
Start: 2025-04-03 | End: 2025-04-24 | Stop reason: HOSPADM

## 2025-04-03 RX ORDER — PRAZOSIN HYDROCHLORIDE 2 MG/1
2 CAPSULE ORAL AT BEDTIME
Status: DISCONTINUED | OUTPATIENT
Start: 2025-04-03 | End: 2025-04-16

## 2025-04-03 RX ORDER — CLONAZEPAM 0.5 MG/1
1 TABLET ORAL 3 TIMES DAILY
Status: DISCONTINUED | OUTPATIENT
Start: 2025-04-03 | End: 2025-04-24 | Stop reason: HOSPADM

## 2025-04-03 RX ORDER — OLANZAPINE 15 MG/1
15 TABLET, FILM COATED ORAL AT BEDTIME
Status: DISCONTINUED | OUTPATIENT
Start: 2025-04-03 | End: 2025-04-24 | Stop reason: HOSPADM

## 2025-04-03 RX ORDER — TRAZODONE HYDROCHLORIDE 50 MG/1
50 TABLET ORAL
Status: DISCONTINUED | OUTPATIENT
Start: 2025-04-03 | End: 2025-04-16

## 2025-04-03 RX ORDER — DOCUSATE SODIUM 100 MG/1
100 CAPSULE, LIQUID FILLED ORAL DAILY
Status: ON HOLD | COMMUNITY
End: 2025-04-23

## 2025-04-03 RX ADMIN — METFORMIN HYDROCHLORIDE 1000 MG: 500 TABLET, FILM COATED ORAL at 18:14

## 2025-04-03 RX ADMIN — METFORMIN HYDROCHLORIDE 1000 MG: 500 TABLET, FILM COATED ORAL at 09:45

## 2025-04-03 RX ADMIN — HYDROXYZINE HYDROCHLORIDE 50 MG: 50 TABLET, FILM COATED ORAL at 13:25

## 2025-04-03 RX ADMIN — DOCUSATE SODIUM 100 MG: 100 CAPSULE, LIQUID FILLED ORAL at 20:52

## 2025-04-03 RX ADMIN — OLANZAPINE 5 MG: 5 TABLET, FILM COATED ORAL at 08:25

## 2025-04-03 RX ADMIN — LAMOTRIGINE 150 MG: 100 TABLET ORAL at 08:26

## 2025-04-03 RX ADMIN — CLONAZEPAM 1 MG: 0.5 TABLET ORAL at 20:52

## 2025-04-03 RX ADMIN — Medication 12.5 MCG: at 08:26

## 2025-04-03 RX ADMIN — TRAZODONE HYDROCHLORIDE 300 MG: 150 TABLET ORAL at 20:54

## 2025-04-03 RX ADMIN — DOCUSATE SODIUM 100 MG: 100 CAPSULE, LIQUID FILLED ORAL at 08:26

## 2025-04-03 RX ADMIN — ARIPIPRAZOLE 5 MG: 5 TABLET ORAL at 13:24

## 2025-04-03 RX ADMIN — CLONAZEPAM 1 MG: 0.5 TABLET ORAL at 13:24

## 2025-04-03 RX ADMIN — PRAZOSIN HYDROCHLORIDE 2 MG: 2 CAPSULE ORAL at 20:54

## 2025-04-03 RX ADMIN — LAMOTRIGINE 150 MG: 100 TABLET ORAL at 20:52

## 2025-04-03 RX ADMIN — CLONAZEPAM 1 MG: 0.5 TABLET ORAL at 08:26

## 2025-04-03 RX ADMIN — OLANZAPINE 15 MG: 15 TABLET, FILM COATED ORAL at 20:54

## 2025-04-03 ASSESSMENT — ACTIVITIES OF DAILY LIVING (ADL)
HYGIENE/GROOMING: INDEPENDENT
ADLS_ACUITY_SCORE: 35
DOING_ERRANDS_INDEPENDENTLY_DIFFICULTY: NO
WALKING_OR_CLIMBING_STAIRS_DIFFICULTY: NO
FALL_HISTORY_WITHIN_LAST_SIX_MONTHS: NO
ADLS_ACUITY_SCORE: 35
ADLS_ACUITY_SCORE: 35
LAUNDRY: WITH SUPERVISION
ORAL_HYGIENE: INDEPENDENT
ADLS_ACUITY_SCORE: 35
ADLS_ACUITY_SCORE: 56
WEAR_GLASSES_OR_BLIND: YES
ADLS_ACUITY_SCORE: 35
CONCENTRATING,_REMEMBERING_OR_MAKING_DECISIONS_DIFFICULTY: YES
ADLS_ACUITY_SCORE: 35
ADLS_ACUITY_SCORE: 35
DIFFICULTY_EATING/SWALLOWING: NO
DRESS: INDEPENDENT
TOILETING_ISSUES: NO
ADLS_ACUITY_SCORE: 35
ADLS_ACUITY_SCORE: 35
ORAL_HYGIENE: INDEPENDENT
ADLS_ACUITY_SCORE: 35
DRESS: INDEPENDENT
HEARING_DIFFICULTY_OR_DEAF: NO
ADLS_ACUITY_SCORE: 35
ADLS_ACUITY_SCORE: 56
ADLS_ACUITY_SCORE: 35
DIFFICULTY_COMMUNICATING: NO
ADLS_ACUITY_SCORE: 35
LAUNDRY: WITH SUPERVISION
ADLS_ACUITY_SCORE: 35
DRESSING/BATHING_DIFFICULTY: NO
ADLS_ACUITY_SCORE: 35
CHANGE_IN_FUNCTIONAL_STATUS_SINCE_ONSET_OF_CURRENT_ILLNESS/INJURY: NO
ADLS_ACUITY_SCORE: 35
ADLS_ACUITY_SCORE: 35
HYGIENE/GROOMING: INDEPENDENT

## 2025-04-03 NOTE — H&P
[facility], [place]   Psychiatric History and Physical.    Chief complaint and reason for admission:     Auditory hallucinations, Suicidal ideation, Self injurious behavior in context of depression    History of present illness:  Misty Parham is a 39 year old female with a past history notable for major depressive disorder, borderline personality disorder, and generalized anxiety disorder with a history of psychosis who presents to the emergency department for evaluation of suicidal ideation and intense urges to engage in self-injurious behavior.  She had reported symptom intensity to the therapist at her treatment program and was referred to the emergency room for further evaluation.  She was determined to be medically stable and transferred to the [facility] unit for psychiatric assessment.  She is now approaching 23 hours in the emergency department.  On examination, the patient was not able to identify a specific psychosocial stressor as being related to recent symptom worsening.  She did identify worsening auditory hallucinations which often prompted suicidal ideation and urges toward self-injurious behavior.  She characterized this intensity as being severe, often feeling consumed by the content of hallucinations, and finding it difficult to refrain from acting on them.  Thinking about her children has helped to lessen the likelihood of engaging in self-injurious behavior however, as symptoms have intensified, this has become less effective.  Today, she expressed a desire to take a knife and cut deeply into her arm.  Suicidal thoughts are reported to be active.  Auditory hallucinations persist today.  She identified her mood as being depressed however is not currently taking an antidepressant medication.  She notes recent medication changes involved increasing the dose of lamotrigine and trazodone.  With those adjustments, sleep is slightly improved however she continues to struggle  "with insomnia.  There was no indication of homicidal ideation.  She is agreeable with her treatment plan.        During visit with this provider:   Pt presented to the conference room with SIO. Pt was dressed in hospital scrubs. Mood appeared sad and depressed. Pt reports that she presented to the ED after cutting her wrist. She stated that she used a plastic knife to cut herself. She further added that cutting her self helps to relieve pain and feels good. She stated that she started cutting herself 3 weeks ago, she added that she started cutting the same area for 3 weeks until it got deeper and started bleeding. She reports that some of her triggers are aggression and yelling. She reports that one staff at her group home had a conflict with a peer at the group and they started yelling at each other and dragged her into it. She shut down and started thinking of suicide by cutting. She endorsed depression and anxiety that has lasted for so long. She also reports hearing voices of angels telling her that it is time to die but when she thinks about her children, she gets confused on whether to die or not. She also reports trouble staying asleep, she keeps waking up at night. Towards the end of the conversation patient was asked if she feels safe and she could not contract for safely because she plans to cut her self, 1:1 SIO was continued. Lab work for yesterday and 4/3/2025 was reviewed.    Shortly after our visit staff reported that pt went to the bathroom and was digging into her wrist wound with a fork.     Collateral Information: Collateral information name, relationship, phone number:  Nasreen De Graff luiz Parry, [phone] nurse at shelter. Writer also left a VM for [facility] Commitment worker Marleny at [phone]     What happened today: Reports that pt has been \"telling everyone that she wants to cut her wrists and end her life.\" Reports that today specifically, pt was at a DBT group and they " "referred her into the ED to be seen.      What is different about patient's functioning: Reports that pt has just been \"very sad.\" Reports that she frequently talks about ending her life and not wanting to live on earth any longer. Reports that pt has only been at this group home for a few months, so unsure of pt's baseline.      What do you think the patient needs:       Has patient made comments about wanting to kill themselves/others: yes     If d/c is recommended, can they take part in safety/aftercare planning:  yes    Pt signed ROIs for her group home, --Marleny Núñez at [phone] and DBT Therapist--Cyn Rod at [employer].     Past psychiatric history:  Pt has hx of Severe episode of recurrent major depressive disorder, with psychotic features (H) , Auditory hallucination ,Borderline personality disorder (H)  Generalized anxiety disorder and Suicidal ideation. She stated that she used to cut herself in highschool. She also reports that one year ago she was hiding one pill of Clonazepam at a time until they got to 10 and was planning to overdose on them, she reported this to her outpatient Psychiatrist who called 911 and she was brought to the ED. Per chart, no history of tobacco use, substance use and currently not drinking alcohol. Pt was last seen on [facility] in January of 2024 and a petition for commitment was filed. The state supported the commitment and pt was placed back on commitment. Pt reports that her commit was just renewed and she continues to remain on a commitment. A note from 2/9/2024 in pt's chart states \"Patient has very severe mental illness with many past psychiatric hospitalizations, multiple courses of ECT including a [facility] order with commitments in the past.\" Pt reports multiple hospitalizations in the past, attending an IRTS facility for the past year and reports multiple attempted overdoses in the past. Pt reports that she does not currently have " access to her medication in her current group home and thought of a different method to harm herself. Pt reports a engaging in self harm in the past and reports that she just recently began cutting herself again. Most recent medications include Effexor, Xanax, Ambien, Zyprexa, Trazodone, Abilify as well as likely others.     Past medical history:    Medically complicated obesity with a BMI of 51.59   Covid 19 Infect  Possible JEAN PIERRE (Sleep problems)    Past Surgical History:     Cholecystectomy.     Family and social history:    She is  and has three children, stepdaughter who is 27, son who is 22 and son who is 18. The 18 year old lives with her sister. Currently residing in group home, attending DBT therapy and in on a MI commitment          Medications:     Current Facility-Administered Medications   Medication Dose Route Frequency Provider Last Rate Last Admin    ARIPiprazole (ABILIFY) tablet 5 mg  5 mg Oral Daily Jd Jones MD   5 mg at 04/03/25 1324    clonazePAM (klonoPIN) tablet 1 mg  1 mg Oral TID Nissa Morris APRN CNP   1 mg at 04/03/25 1324    docusate sodium (COLACE) capsule 100 mg  100 mg Oral BID Nissa Morris APRN CNP   100 mg at 04/03/25 0826    lamoTRIgine (LaMICtal) tablet 150 mg  150 mg Oral BID Nissa Morris APRN CNP   150 mg at 04/03/25 0826    levothyroxine (SYNTHROID/LEVOTHROID) half-tab 12.5 mcg  12.5 mcg Oral QAM AC Nissa Morris APRN CNP   12.5 mcg at 04/03/25 0826    metFORMIN (GLUCOPHAGE) tablet 1,000 mg  1,000 mg Oral BID w/meals Nissa Morris APRN CNP   1,000 mg at 04/03/25 0945    OLANZapine (zyPREXA) tablet 15 mg  15 mg Oral At Bedtime Nissa Morris APRN CNP        prazosin (MINIPRESS) capsule 2 mg  2 mg Oral At Bedtime Nissa Morris APRN CNP        traZODone (DESYREL) tablet 300 mg  300 mg Oral At Bedtime Majeski, Nissa Ana, APRN CNP              Allergies:   No Known Allergies       Labs:      Recent Results (from the past 24 hours)   HCG qualitative urine    Collection Time: 04/02/25  7:02 PM   Result Value Ref Range    hCG Urine Qualitative Negative Negative   Urine Drug Screen Panel    Collection Time: 04/02/25  7:02 PM   Result Value Ref Range    Amphetamines Urine Screen Negative Screen Negative    Barbituates Urine Screen Negative Screen Negative    Benzodiazepine Urine Screen Positive (A) Screen Negative    Cannabinoids Urine Screen Negative Screen Negative    Cocaine Urine Screen Negative Screen Negative    Fentanyl Qual Urine Screen Negative Screen Negative    Opiates Urine Screen Negative Screen Negative    PCP Urine Screen Negative Screen Negative   Comprehensive metabolic panel    Collection Time: 04/03/25  7:25 AM   Result Value Ref Range    Sodium 139 135 - 145 mmol/L    Potassium 4.5 3.4 - 5.3 mmol/L    Carbon Dioxide (CO2) 27 22 - 29 mmol/L    Anion Gap 10 7 - 15 mmol/L    Urea Nitrogen 15.7 6.0 - 20.0 mg/dL    Creatinine 0.94 0.51 - 0.95 mg/dL    GFR Estimate 78 >60 mL/min/1.73m2    Calcium 9.5 8.8 - 10.4 mg/dL    Chloride 102 98 - 107 mmol/L    Glucose 93 70 - 99 mg/dL    Alkaline Phosphatase 75 40 - 150 U/L    AST 23 0 - 45 U/L    ALT 27 0 - 50 U/L    Protein Total 7.1 6.4 - 8.3 g/dL    Albumin 3.9 3.5 - 5.2 g/dL    Bilirubin Total 0.3 <=1.2 mg/dL   Hemoglobin A1c    Collection Time: 04/03/25  7:25 AM   Result Value Ref Range    Estimated Average Glucose 103 <117 mg/dL    Hemoglobin A1C 5.2 <5.7 %   Lipid panel    Collection Time: 04/03/25  7:25 AM   Result Value Ref Range    Cholesterol 133 <200 mg/dL    Triglycerides 123 <150 mg/dL    Direct Measure HDL 39 (L) >=50 mg/dL    LDL Cholesterol Calculated 69 <100 mg/dL    Non HDL Cholesterol 94 <130 mg/dL   TSH with free T4 reflex and/or T3 as indicated    Collection Time: 04/03/25  7:25 AM   Result Value Ref Range    TSH 1.88 0.30 - 4.20 uIU/mL   CBC with platelets and differential    Collection Time: 04/03/25  7:25 AM   Result Value  "Ref Range    WBC Count 8.2 4.0 - 11.0 10e3/uL    RBC Count 4.67 3.80 - 5.20 10e6/uL    Hemoglobin 13.1 11.7 - 15.7 g/dL    Hematocrit 40.9 35.0 - 47.0 %    MCV 88 78 - 100 fL    MCH 28.1 26.5 - 33.0 pg    MCHC 32.0 31.5 - 36.5 g/dL    RDW 14.7 10.0 - 15.0 %    Platelet Count 292 150 - 450 10e3/uL    % Neutrophils 52 %    % Lymphocytes 31 %    % Monocytes 8 %    % Eosinophils 9 %    % Basophils 1 %    % Immature Granulocytes 0 %    NRBCs per 100 WBC 0 <1 /100    Absolute Neutrophils 4.2 1.6 - 8.3 10e3/uL    Absolute Lymphocytes 2.5 0.8 - 5.3 10e3/uL    Absolute Monocytes 0.6 0.0 - 1.3 10e3/uL    Absolute Eosinophils 0.8 (H) 0.0 - 0.7 10e3/uL    Absolute Basophils 0.0 0.0 - 0.2 10e3/uL    Absolute Immature Granulocytes 0.0 <=0.4 10e3/uL    Absolute NRBCs 0.0 10e3/uL          Psychiatric Examination:     /83   Pulse 86   Temp 97  F (36.1  C) (Oral)   Resp 16   Ht 1.651 m (5' 5\")   Wt (!) 144.2 kg (317 lb 14.4 oz)   SpO2 95%   BMI 52.90 kg/m    Weight is 317 lbs 14.4 oz  Body mass index is 52.9 kg/m .                Sitting Orthostatic BP: 118/84      Sitting Orthostatic Pulse: 105 bpm      Standing Orthostatic BP: 122/88      Standing Orthostatic Pulse: 103 bpm       Appearance: awake, alert, dressed in hospital scrubs, morbidly obese  Attitude:  cooperative  Eye Contact:  fair  Mood:  depressed  Affect:  intensity is flat  Speech:  decreased prosody, soft, slow  Psychomotor Behavior:  some psychomotor retardation noted, no evidence of tardive dyskinesia, dystonia, or tics  Thought Process:  linear  Associations:  no loose associations  Thought Content:  active suicidal ideation present and auditory hallucinations present, denies visual hallucinations  Insight:  poor  Judgement:  poor  Oriented to:  time, person, and place  Attention Span and Concentration:  limited  Recent and Remote Memory: limited       Review of systems:     For physical examination and 12 point review of system please, see note of " Goldie Marc MD from 4/1/25.  I reviewed that note and agree with it.         DIagnoses:     Borderline Personality Disorder  Major Depressive Disorder Recurrent with Psychosis         Plan:     On 4/3/25 we discussed with pt and started with her permission on Abilify 5 mg and started tapering off Zyprexa. We will continue to provide and structure to patient throughout her hospital stay. In light of patient's continuous Self injurious behavior and inability to contract for safety will continue on SIO and No roommate order. CTC will check patient's legal status (she is going through recommitment).        I was present with PA student who participated in the service and in the documentation of the note. I have verified the history and personally performed the physical exam and medical decision making. I agree with the assessment and plan of care as documented in the note.     Jd Jones MD  [facility] Psychiatry     Total time spent was 58 minutes. Over 50% of times was spent counseling and coordination of care regarding coping skills, medication and discharge planning.

## 2025-04-03 NOTE — PLAN OF CARE
ADMISSION NURSING PLAN OF CARE   Problem: Adult Behavioral Health Plan of Care  Goal: Adheres to Safety Considerations for Self and Others  Intervention: Develop and Maintain Individualized Safety Plan  Recent Flowsheet Documentation  Taken 4/3/2025 0200 by Radha Saldivar RN  Safety Measures:   belongings check completed   safety rounds completed   1:1 observation maintained     Problem: Suicidal Behavior  Goal: Suicidal Behavior is Absent or Managed  Outcome: Not Progressing     Problem: Psychotic Signs/Symptoms  Goal: Improved Behavioral Control (Psychotic Signs/Symptoms)  Outcome: Not Progressing   Goal Outcome Evaluation:    Plan of Care Reviewed With: patient      Pt is a 39-year-old female admitted from [facility] due to suicidal ideation and intense urges to engage in self-injurious behavior; pt was calm and cooperative with admission search and assessment interview;     Per chart review, pt has a history of major depressive disorder, borderline personality disorder, and generalized anxiety disorder with a history of psychosis who presents to the emergency department for evaluation of  to suicidal ideation and intense urges to engage in self-injurious behavior. She characterized this intensity as being severe, often feeling consumed by the content of hallucinations, and finding it difficult to refrain from acting on them.      On admission- pt endorsed command auditory hallucination and said that her angles are telling her to harm and kill her self; pt says she can't control the urges and  not able to contract for safety and placed on 1:1 SIO for SI/SIB observation; pt is very polite and cooperative; reported thinking about her children, chewing ice and using headphones help with distraction from the voices; rated both anxiety and depression 7/10; vital sign stable; ambulates independently and does not need help with  ADLs. Patient presents with blunted affect, mood is calm and cooperative, eyes  contact was good during interview. Speech is soft and coherent. Pt has superficial plastic knife cut on her left wrist and nail scratch behind her right ear; wrist wound cleaned, bacitracin applied and cover with Mepliex;  bed changed to behavioral safety blanket and pillow; Is on SI/SIB precaution. Pt took her scheduled medication prior to admission. No PRN requested or indicated during this shift.       Patient's legal status on arrival is Voluntary; Appropriate legal rights discussed with, and copy given to patient; patient given tour of unit; belongings inventoried, and admission assessment completed. Encouraged to voice needs, questions, and concerns. Pt verbalizes understanding.      Pt appeared to have slept for 3 hrs

## 2025-04-03 NOTE — PLAN OF CARE
04/03/25 1026   Individualization/Patient Specific Goals   Patient Personal Strengths expressive of needs;no history of violence   Patient Vulnerabilities history of unsuccessful treatment   Anxieties, Fears or Concerns has command hallucinations   Individualized Care Needs Medication Management   Patient/Family-Specific Goals (Include Timeframe) Stabilzie and rturn home   Interprofessional Rounds   Summary Pt was admitted for command auditory hallucinations, suicidal ideation and self injury urges. She is disabled on medicare and has medical assistance. SHe is on SIO and has a no roommate order.   Participants CTC;TR;nursing;OT   Behavioral Team Discussion   Participants Jd Jones MD, Marietta WHITMORE, Courtney Johnson RN, Jennifer Luna OTR, Mariann HANSON NP student   Progress this s a new admission and pt will be evaluation by the disciplines today   Anticipated length of stay 1 week   Continued Stay Criteria/Rationale the pt needs thorough evaluation of risk of harm to self   Medical/Physical weight gain due to medications, metformin needs to reconclied, chronic insomnia   Precautions SI, SIB   Plan Multidisciplinary evaluation, medicaiton review   Rationale for change in precautions or plan na   Safety Plan will be completed by unit therapist   Anticipated Discharge Disposition home with family     Goal Outcome Evaluation:

## 2025-04-03 NOTE — PLAN OF CARE
INITIAL PSYCHOSOCIAL ASSESSMENT AND NOTE    Information for assessment was obtained from:       [x]Patient     []Parent     []Community provider    [x]Hospital records   []Other     []Guardian       Presenting Problem:  Patient is a 39 year old female who uses she/her. Patient was admitted to [facility] on 4/3/2025 [facility] voluntarily.    Presenting issues and presentation for admit:   Pt has been hearing anmol voices.  Pt has been buying over the counter Tylenol to overdose.  Chronic hoarding of medications  Pt told therapist at DBT program that she had sucilda ideation and urges to self harm that she would not be able to control.      The following areas have been assessed:    History of Mental Health and Chemical Dependency:  Mental Health History:  Patient has a historical diagnosis of   Active Problems   Auditory hallucination R44.0 Medium  5/22/2021   Borderline personality disorder (H) F60.3 Medium  12/27/2024   Chronic insomnia F51.04 Medium  11/19/2015   COVID-19 U07.1 Medium  2/3/2024   Depression F32.A Medium  2/7/2018   Depression with suicidal ideation F32.A, R45.851 Medium  1/3/2019   Depression, unspecified depression type F32.A Medium  4/1/2025   Facial cellulitis L03.211 Medium  4/7/2021   Generalized anxiety disorder F41.1 Medium  5/15/2020   Major depression F32.9 Medium  6/20/2019   Morbid obesity (H) E66.01 Medium  6/25/2021   Psychosis, unspecified psychosis type (H) F29 Medium  5/22/2021   Suicidal behavior R45.89 Medium  2/18/2020     .Severe Borderline Personality DO with psychosis  Generalized Anxiety DO  Major Depressive DO with psychosis  Obsessive Compulsive DO  Rule out Unspecified Intellectual Disability            The patient has a history of suicide attempts .   Patient  has not a history of engaged in non-suicidal self-injury .     Previous psychiatric hospitalizations and treatments (including outpatient, residential, and  inpatient care:  4/2/24 to present @ [facility] [address]  2/16/2024 - 3/1/2024 @ [facility]  1/31/2024 - 2/15/2024  @ [facility] - Covid while in ER  10/11/21 to 10/27/21 @ [facility]    Other service  2024 - [facility]  2021 - [facility]  ECT    Substance Use History  None noted      Patient's current relationship status is   .   Patient reported having three child(bennie).       Family Description (Constellation, significant information and events, Family Psychiatric History):     Pt was born and raised in [place], [place] and is one of 4 children. She has 2 older sisters and one younger sister.  Pt  shortly after high school.  Pt is  since 2012  with 2 adult sons.  She also has an adult daughter.  The children are cared for by the pt's sister. Pt tries to be supportive to her children.    Mother is involved as she contacted the [facility] to see where the pt was and the [facility] will give mother the contact information for the unit.      Significant Medical issues, Life events or Trauma history:       Elevated blood pressure  -- Patient was noticed to have slightly elevated blood pressure especially diastolic close to 100, patient does not have any known history of hypertension,  -- will continue to monitor , blood pressure is significantly improved     Medically complicated obesity with a BMI of 51.59  Increase all-cause mortality and morbidity.    --consider lifestyle modification with diet and exercise as able to.     Possible JEAN PIERRE/OHS  --Sleep studies as outpatient recommended    Living Situation:  Patient's current living/housing situation is  [facility] . They live with other residents and they report that housing is stable and they are able to return upon discharge.       Educational Background:    Patient's highest education level was high school graduate. Patient reports they are  able to  understand written materials.     Occupational and Financial Status:     Patient is currently unemployed.  Patient reports  income is obtained through SSDI disability.  Patient does not identify finances as a current stressor. T     Medicare Primary   hey are insured under Part A Effective Date: 2022     Part B Effective Date: 2022   Medicare IP Day Remaining   Last bill date: 2024   Medicare allowed days before deductions 60/30/60    Day remaining after Medicare days deducted   Full days 60 - manually deducted days   Total Full and Co-Ins days remainin  Lifetime reserve days allow 60: 59 remaining   Insurance Company Name   PMI # [id number]  [facility] Restrictions (No/Yes): No    Occupational History:       Legal Concerns (current or past history):       Current Concerns: None noted    Past History: None noted      Legal Status:    Currently under a commitment that ends on May 1, 2025  Undergoing a recommitment         Exam:  @ 2:15PM    Hearin/14      [facility]:   [facility]  File Number:   Start and expiration date of commitment:     Commitment History: has had 3 previous commitments,       Service History: None    Ethnic/Cultural/Spiritual considerations:   The patient describes their cultural background as White/, heterosexual, female.  Contextual influences on patient's health include mobility, severity of symptoms, safety concerns, and level of functioning.   Patient identified their preferred language to be English. Patient reported they do not need the assistance of an .  Spiritual considerations include: Per cahrt: Pt reports she converted to Episcopal when she was .     Social Functioning (organizations, interests, support system):   In their free time, patient reports they like to Unknown.      Patient identified siblings and adult child as part of their support system.  Patient identified the quality of these relationships as  good.       Current Treatment Providers are:  Primary Care Provider:  Name/Clinic:      Number:       Medication Management/Psychiatry:  [facility]   [address] [place]   [address]   [place] [address]   [phone]  Betty Cazares APRN, CNP   [address] [place] [address]   [phone] (Work)   [phone] (Fax)        Therapist:   Name/Clinic: Monse Hannah, [facility]           DBT Program  YANIRA Mckeon  3 days a week      /ACT Team:  Name/Clinic: Marlenyangel Núñez  [facility]   Number: [phone]  Fax: [phone]  [email or web address].      CADI   [facility]  [facility]  Assisted Living    [facility]  Name/Clinic: [facility]  Avril Roberto is the supervisor  Number: .[phone]  Pt lives at:  [address] [place] [address]      Other contact information (family, friends, SO) and GEORGI status:           GOALS FOR HOSPITALIZATION:  What do patient want to accomplish during this hospitalization to make things better for the patient.?   Patient priorities:  The patient is having hallunicaitons, cannot contract for safety and is on a one -to - one.    I delivered her court paperwork to her and she did not respond.  Affect was flat.      Social Service Assessment/Plan:  Patient view:     Pt is under a commitment but on a PD that has not been revoked.  TCM has petitioned for recommittment..  TCM hopes for short stay and return to [facility].      Strengths and Assets:  The patient uses these coping skills to help with stress and hard times:   Pt is in DBT groups.          Patient will have psychiatric assessment and medication management by the psychiatrist. Medications will be reviewed and adjusted per DO/MD/APRN CNP as indicated. The treatment team will continue to assess and stabilize the patient's mental health symptoms with the use of medications  and therapeutic programming. Hospital staff will provide a safe environment and a therapeutic milieu. Staff will continue to assess patient as needed. Patient will participate in unit groups and activities. Patient will receive individual and group support on the unit.      CTC will do individual inpatient treatment planning and after care planning. CTC will discuss options for increasing community supports with the patient. CTC will coordinate with outpatient providers and will place referrals to ensure appropriate follow up care is in place.

## 2025-04-03 NOTE — PLAN OF CARE
"  Problem: Suicidal Behavior  Goal: Suicidal Behavior is Absent or Managed  Intervention: Provide Immediate and Ongoing Protective Physical Environment  Recent Flowsheet Documentation  Taken 4/3/2025 1127 by Courtney Johnson RN  Safe Transition Promotion:   personal safety plan developed   protective factors promoted   Goal Outcome Evaluation:    Plan of Care Reviewed With: patient      Patient alert and conversant. She presented with flat, blunted affect. She denied pain. She endorsed anxiety 5/10, depression 8/10, SI thought with plan and intent. She said she wants to end her life by cutting and would do it if she has a chance. Patient did not contract for safety. She reported auditory hallucinations. According to her, she hears angels telling her that it's time to die and join them. She stayed in her room most of the time except for meals. Patient was eating and drinking adequately.     Superficial wounds noted on left wrist. Covered with dressing.     Per PA, when she went in for SIO, patient was already in the bathroom. PA then knocked on the door and when she opened it, patient was seen facing the mirror and scratching her left wrist with a fork. PA intervened and redirected the patient. Scratches noted on the area. Dressing applied. As per patient, she couldn't help it, \"the angels are telling me to hurt myself\". Informed the provider, charge and manager. Provider will order a safe tray without utensils and dressing for the wound.       "

## 2025-04-03 NOTE — PLAN OF CARE
04/03/25 0430   Patient Belongings   Did you bring any home meds/supplements to the hospital?  No   Patient Belongings locker   Patient Belongings Put in Hospital Secure Location (Security or Locker, etc.) cell phone/electronics;clothing;shoes;purse/wallet   Belongings Search Yes   Clothing Search Yes   Second Staff Zahira MELENDREZ   Comment Envelope # [id number] sent to security     Goal Outcome Evaluation:    Items kept in storage  Bag pack (small purse), Cell phone, Headphone, Bill fold, Shoes, Sweat shirt, Pants,  Shirt, Soaks.    Items sent to security  [place]  Drivers License, Visa    Admission Signature    Patient Signature    ___________________    Staff Signature    _____________________    2nd Staff if patient can't sign    ____________________      Discharge Signature    I have received all my belongings    Patient Signature    ____________________    Staff Signature    _________________

## 2025-04-03 NOTE — PROGRESS NOTES
Patient discharged to [facility] Discharge instructions reviewed with patient iEmtala signed. Patient continues to endorse SI and HI, no self harm behavior this shift. All belongings that were brought into the hospital have been returned to patient. Patient transported via ambulance. Patient's vitals are stable upon discharge.

## 2025-04-03 NOTE — PHARMACY-ADMISSION MEDICATION HISTORY
Pharmacist Admission Medication History    Admission medication history is complete. The information provided in this note is only as accurate as the sources available at the time of the update.    Information Source(s): Facility (U/NH/) medication list/MAR via phone    Pertinent Information: confirmed with group home staff that last doses of medications at group [place] were 4/1/2025 at noon prior to transfer to [facility]. She has not missed any lamotrigine doses in the last week. She does not have any PRN medications.     Changes made to PTA medication list:  Added: None  Deleted: Depakote, lithium, Miralax, valacyclovir, trazodone PRN  Changed: docusate (was 100 mg BID, now daily)    Allergies reviewed with patient and updates made in EHR: unable to assess    Medication History Completed By: Rossana Romero RPH 4/3/2025 1:14 PM    PTA Med List   Medication Sig Last Dose/Taking    clonazePAM (KLONOPIN) 1 MG tablet Take 1 mg by mouth 3 times daily. 4/1/2025    docusate sodium (COLACE) 100 MG capsule Take 100 mg by mouth daily. 4/1/2025    hydrOXYzine HCl (ATARAX) 50 MG tablet Take 50 mg by mouth 2 times daily. 4/1/2025    lamoTRIgine (LAMICTAL) 150 MG tablet Take 150 mg by mouth 2 times daily. 4/1/2025    levothyroxine (SYNTHROID/LEVOTHROID) 25 MCG tablet Take 0.5 tablets (12.5 mcg) by mouth every morning (before breakfast). 4/1/2025    metFORMIN (GLUCOPHAGE) 1000 MG tablet Take 1 tablet (1,000 mg) by mouth 2 times daily (with meals). 4/1/2025    miconazole (MICATIN) 2 % external powder Apply topically 2 times daily. to rash under breasts 4/1/2025    OLANZapine (ZYPREXA) 20 MG tablet Take 10 mg by mouth at bedtime. 4/1/2025    OLANZapine (ZYPREXA) 5 MG tablet Take 5 mg by mouth 2 times daily. AM and Lunch 4/1/2025    Omega-3 Fish Oil 500 MG capsule Take 1 capsule (500 mg) by mouth daily. 4/1/2025    prazosin (MINIPRESS) 2 MG capsule Take 2 mg by mouth at bedtime. 4/1/2025    traZODone (DESYREL) 150 MG tablet  Take 300 mg by mouth at bedtime. 4/1/2025

## 2025-04-03 NOTE — PLAN OF CARE
"Goal Outcome Evaluation:    Initial meeting note:    Therapist introduced self to patient and discussed psychotherapy service available to patient.     Pt response: Pt expressed interest in meeting with therapist    Plan: Made plan to meet with pt again; began identifying goals       Writer met pt in her room, she said she had cut herself with a fork because \" angels told me to do it.\"    She said it was a rough day because of above. Writer and she agreed to check in in next few days.                        "

## 2025-04-03 NOTE — ED NOTES
Patient was accepted to unit 30N at [facility]. Per Central Intake, the unit will contact staffing to see if they are able to staff appropriately to take this patient on the night shift. Unit 30 will call report when they are staffed appropriate for the admission. They can be reached at [phone].

## 2025-04-04 PROCEDURE — 250N000013 HC RX MED GY IP 250 OP 250 PS 637: Performed by: NURSE PRACTITIONER

## 2025-04-04 PROCEDURE — 250N000013 HC RX MED GY IP 250 OP 250 PS 637: Performed by: PSYCHIATRY & NEUROLOGY

## 2025-04-04 PROCEDURE — G0463 HOSPITAL OUTPT CLINIC VISIT: HCPCS

## 2025-04-04 PROCEDURE — 124N000002 HC R&B MH UMMC

## 2025-04-04 PROCEDURE — 99233 SBSQ HOSP IP/OBS HIGH 50: CPT | Performed by: PSYCHIATRY & NEUROLOGY

## 2025-04-04 RX ADMIN — ARIPIPRAZOLE 5 MG: 5 TABLET ORAL at 08:20

## 2025-04-04 RX ADMIN — METFORMIN HYDROCHLORIDE 1000 MG: 500 TABLET, FILM COATED ORAL at 08:19

## 2025-04-04 RX ADMIN — OLANZAPINE 10 MG: 10 TABLET, FILM COATED ORAL at 16:31

## 2025-04-04 RX ADMIN — OLANZAPINE 15 MG: 15 TABLET, FILM COATED ORAL at 20:38

## 2025-04-04 RX ADMIN — DOCUSATE SODIUM 100 MG: 100 CAPSULE, LIQUID FILLED ORAL at 08:20

## 2025-04-04 RX ADMIN — LAMOTRIGINE 150 MG: 100 TABLET ORAL at 08:19

## 2025-04-04 RX ADMIN — TRAZODONE HYDROCHLORIDE 300 MG: 150 TABLET ORAL at 20:38

## 2025-04-04 RX ADMIN — CLONAZEPAM 1 MG: 0.5 TABLET ORAL at 13:52

## 2025-04-04 RX ADMIN — PRAZOSIN HYDROCHLORIDE 2 MG: 2 CAPSULE ORAL at 20:38

## 2025-04-04 RX ADMIN — Medication 12.5 MCG: at 08:19

## 2025-04-04 RX ADMIN — METFORMIN HYDROCHLORIDE 1000 MG: 500 TABLET, FILM COATED ORAL at 17:51

## 2025-04-04 RX ADMIN — HYDROXYZINE HYDROCHLORIDE 50 MG: 50 TABLET, FILM COATED ORAL at 16:31

## 2025-04-04 RX ADMIN — LAMOTRIGINE 150 MG: 100 TABLET ORAL at 20:38

## 2025-04-04 RX ADMIN — CLONAZEPAM 1 MG: 0.5 TABLET ORAL at 08:19

## 2025-04-04 RX ADMIN — CLONAZEPAM 1 MG: 0.5 TABLET ORAL at 20:38

## 2025-04-04 RX ADMIN — DOCUSATE SODIUM 100 MG: 100 CAPSULE, LIQUID FILLED ORAL at 20:38

## 2025-04-04 ASSESSMENT — ACTIVITIES OF DAILY LIVING (ADL)
ADLS_ACUITY_SCORE: 35
HYGIENE/GROOMING: INDEPENDENT;PROMPTS
DRESS: INDEPENDENT
ADLS_ACUITY_SCORE: 35
ADLS_ACUITY_SCORE: 35
ORAL_HYGIENE: INDEPENDENT
ADLS_ACUITY_SCORE: 35
ADLS_ACUITY_SCORE: 45
ADLS_ACUITY_SCORE: 35
ADLS_ACUITY_SCORE: 35
LAUNDRY: WITH SUPERVISION
ADLS_ACUITY_SCORE: 35

## 2025-04-04 NOTE — PROGRESS NOTES
"Write spoke to patient at 0730 about scratching herself with a fork. When asked why she did this she reported \"Satisfaction thru pain.\".  "

## 2025-04-04 NOTE — PLAN OF CARE
"  Problem: Psychotic Signs/Symptoms  Goal: Improved Behavioral Control (Psychotic Signs/Symptoms)  Outcome: Progressing     Pt. On SIO for Self - injury risk     Pt. Reports having strong urges to cut, staff encouraging distraction, walking and music therapy, pt declined. Refused to attend all groups. Pt observed picking at her superficial scratches on her forearm and behind her ears, clean bandages applies. Pt. Rubbed her wrist along the edge of the cardboard meal tray, easily redirected.  Pt visible in the lounge isolative and withdrawn to self, long periods of watching TV. Speech is soft/quiet, resistant to share much information, short answer responses, avoids eye contact stares at floor. Pt endorses auditory hallucinations telling her negative things, appears distracted. Out in the lounge for meals excellent appetite.    Pt endorses anxiety and depression, endorses SIB urges, SI thoughts with no plan, denies HI. Medication compliance No PRN utilized (declined), DOES NOT CONTRACT FOR SAFETY    Blood pressure 115/84, pulse 102, temperature 97.4  F (36.3  C), temperature source Oral, resp. rate 16, height 1.651 m (5' 5\"), weight (!) 144.2 kg (317 lb 14.4 oz), SpO2 95%, not currently breastfeeding.                             "

## 2025-04-04 NOTE — CONSULTS
[facility] Nurse Inpatient Assessment     Consulted for: left wrist    Summary: superficial trauma to left wrist from plastic knife    [facility] nurse follow-up plan: signing off    Patient History (according to provider note(s):      Misty Parham is a 39 year old female with a past history notable for major depressive disorder, borderline personality disorder, and generalized anxiety disorder with a history of psychosis who presents to the emergency department for evaluation of suicidal ideation and intense urges to engage in self-injurious behavior.  She had reported symptom intensity to the therapist at her treatment program and was referred to the emergency room for further evaluation.  She was determined to be medically stable and transferred to the [facility] unit for psychiatric assessment.  She is now approaching 23 hours in the emergency department.  On examination, the patient was not able to identify a specific psychosocial stressor as being related to recent symptom worsening.  She did identify worsening auditory hallucinations which often prompted suicidal ideation and urges toward self-injurious behavior.  She characterized this intensity as being severe, often feeling consumed by the content of hallucinations, and finding it difficult to refrain from acting on them.  Thinking about her children has helped to lessen the likelihood of engaging in self-injurious behavior however, as symptoms have intensified, this has become less effective.  Today, she expressed a desire to take a knife and cut deeply into her arm.  Suicidal thoughts are reported to be active.  Auditory hallucinations persist today.  She identified her mood as being depressed however is not currently taking an antidepressant medication.  She notes recent medication changes involved increasing the dose of lamotrigine and trazodone.  With those adjustments, sleep is slightly improved however she  "continues to struggle with insomnia.  There was no indication of homicidal ideation.  She is agreeable with her treatment plan.       Assessment:      Areas visualized during today's visit:  left wrist    Wound location: Left wrist    Last photo: 4/4/25  Wound due to: Trauma/SIB  Wound history/plan of care: Per nursing note:    Per PA, when she went in for SIO, patient was already in the bathroom. PA then knocked on the door and when she opened it, patient was seen facing the mirror and scratching her left wrist with a fork. PA intervened and redirected the patient. Scratches noted on the area. Dressing applied. As per patient, she couldn't help it, \"the angels are telling me to hurt myself\". Informed the provider, charge and manager. Provider will order a safe tray without utensils and dressing for the wound.   Wound base: 100 % Epidermis     Palpation of the wound bed: normal      Drainage: scant     Description of drainage: serosanguinous     Measurements (length x width x depth, in cm): See photos    Periwound skin: Intact      Color: normal and consistent with surrounding tissue      Temperature: normal   Odor: none  Pain: denies , none  Pain interventions prior to dressing change: no significant pain present   Treatment goal: Heal   STATUS: initial assessment  Supplies ordered: supplies stored on unit, discussed with RN, and discussed with patient        Treatment Plan:     Left wrist wound(s): Every other day and as needed.   OK to shower with band aid off.   Cleanse with saline and pat dry.   Apply vaseline or aquaphor to area and cover with large band aid.   OK to discontinue when healed.      Orders: Written    RECOMMEND PRIMARY TEAM ORDER: None, at this time  Education provided: plan of care, wound progress, and Infection prevention   Discussed plan of care with: Patient and Nurse  Notify [facility] if wound(s) deteriorate.  Nursing to notify the Provider(s) and re-consult the WOC Nurse if new skin " concern.    DATA:     Current support surface: Standard  Foam mattress (Behavioral unit)  Containment of urine/stool: Continent of bladder and Continent of bowel  BMI: Body mass index is 52.9 kg/m .   Active diet order: Orders Placed This Encounter      [employer] Adult     Output: No intake/output data recorded.     Labs:   Recent Labs   Lab 04/03/25  0725   ALBUMIN 3.9   HGB 13.1   WBC 8.2   A1C 5.2     Pressure injury risk assessment:   Sensory Perception: 4-->no impairment  Moisture: 4-->rarely moist  Activity: 4-->walks frequently  Mobility: 4-->no limitation  Nutrition: 3-->adequate  Friction and Shear: 3-->no apparent problem  Niko Score: 22    Peace Quiroz RN CWOCN  Pager no longer is use, please contact through [facility] group: [facility] Nurse [facility]  Dept. Office Number: [phone]

## 2025-04-04 NOTE — PLAN OF CARE
Team Note Due:  Thursday    Assessment/Intervention/Current Symtoms and Care Coordination:  Chart review and met with team, discussed pt progress, symptomology, and response to treatment.  Discussed the discharge plan and any potential impediments to discharge.      Team Meeting  CTC reported that pt's status at the [facility] facility is secure and she can return there.  CTC reported that the TCM would like this to be a short stay.  Pt remains on SIO.  She is scratching her wounds.      Wound Nurse came to see pt around 1:30PM.       I called the [facility] [address] to get the commitment orders.  There is no [place].  I filed the court orders and PD in the chart.  [id number]   Order for Commitment As A Person Who Poses A Risk of Hrm Due to A Mental Illness  Dated: 2/15/24 to 24  PD'd on 3/1/24  Order for Continued Commitment   24 to 25      Petitioning  for Recommitment:    Exam:  @ 2:15  Hearin/14       Discharge Plan or Goal:  Return to the [facility]     Barriers to Discharge:  Too symptomatic     Referral Status:  Has all established providers     Legal Status:  There is no [place].  [id number]   Order for Commitment As A Person Who Poses A Risk of Hrm Due to A Mental Illness  Dated: 2/15/24 to 24  PD'd on 3/1/24  Order for Continued Commitment   24 to 25    On a provisional discharge that is not revoked so is volntary  Under Commitment and [place]  Is in the process of being recommitted     [facility]: [place]  File Number: New recommittment is   Start and expiration date of commitment:     Exam:  @ 2:15  Hearin/14    Contacts (include GEORGI status):          Upcoming Meetings and Dates/Important Information and next steps:

## 2025-04-04 NOTE — PLAN OF CARE
"Patient spent parts of the shift in the milieu and other times in her room. Reports being depressed and having some anxiety. Continues to have urges to hurt herself. Is on a 24 hour SIO. Observed touching her forearm where she has the superficial scratches. Patient was redirected. Bandage changed. Ate dinner and snack. Affect is flat and guarded. Very soft spoken and keeps to herself. Med compliant. Showered.     Patient evaluation continues. Assessed mood,anxiety,thoughts and behavior.     Patient gradually progressing towards goals.    Patient is encouraged to participate in groups and assisted to develop healthy coping skills.     VS reviewed: /84   Pulse 102   Temp 98.3  F (36.8  C) (Oral)   Resp 16   Ht 1.651 m (5' 5\")   Wt (!) 144.2 kg (317 lb 14.4 oz)   SpO2 93%   BMI 52.90 kg/m      Length of stay: 0    Refer to daily team meeting notes for individualized plan of care. Nursing will continue to assess.    "

## 2025-04-04 NOTE — PROGRESS NOTES
"[facility], [place]   Psychiatric Progress Note        Interim History:   The patient's care was discussed with the treatment team during the daily team meeting and/or staff's chart notes were reviewed.  Staff report patient slept for about 6.5 hours. She continued on No roommate and SIO due to inability to contract for safety and Self injurious behavior. Was seen by wound care nurse (appreciate help) who provided following recommendations: Left wrist wound(s): Every other day and as needed.   OK to shower with band aid off.   Cleanse with saline and pat dry.   Apply vaseline or aquaphor to area and cover with large band aid.   OK to discontinue when healed.      Despite being on SIO patient continued with Self injurious behavior trying to scratch self, pick her skin with nails, see RN's note below:    \"Pt. On SIO for Self - injury risk     Pt. Reports having strong urges to cut, staff encouraging distraction, walking and music therapy, pt declined. Refused to attend all groups. Pt observed picking at her superficial scratches on her forearm and behind her ears, clean bandages applies. Pt. Rubbed her wrist along the edge of the cardboard meal tray, easily redirected.  Pt visible in the lounge isolative and withdrawn to self, long periods of watching TV. Speech is soft/quiet, resistant to share much information, short answer responses, avoids eye contact stares at floor. Pt endorses auditory hallucinations telling her negative things, appears distracted. Out in the lounge for meals excellent appetite.     Seen by the wound nurse, no new orders      Pt endorses anxiety and depression, endorses SIB urges, SI thoughts with no plan, denies HI. Medication compliance No PRN utilized (declined), DOES NOT CONTRACT FOR SAFETY\"    Met with patient: patient was seen in her room in presence of student and SIO staff. She was sitting on her bed and talked to us. Was more open today, said that Self " "injurious behavior had been going on for years and started back in high school when she was in special classes and was picked on by other students and her parents were not paying attention to her problems. Said that she had difficult life with her ex  who was from [facility] and his family and they, eventually, had to divorce and that Self injurious behavior was providing some soothing effect. Misty denied med side effects, denied presence of acute Suicidal ideation. She seemed to be aware that her Self injurious behavior was dysfunctional and agreed that she should discuss with staff her urges to harm self, but, again, admitted that \"it is hard to do\". Confirmed that she was still hearing voices of angels from time to time: \"they are still there\". We suggested not to make any med changes today after replacing some of her Zyprexa with Abilify, keep SIO. Misty agreed with that and said that she had no questions or concerns.         Medications:     Current Facility-Administered Medications   Medication Dose Route Frequency Provider Last Rate Last Admin    ARIPiprazole (ABILIFY) tablet 5 mg  5 mg Oral Daily Jd Jones MD   5 mg at 04/04/25 0820    clonazePAM (klonoPIN) tablet 1 mg  1 mg Oral TID Nissa Morris APRN CNP   1 mg at 04/04/25 1352    docusate sodium (COLACE) capsule 100 mg  100 mg Oral BID Nissa Morris APRN CNP   100 mg at 04/04/25 0820    lamoTRIgine (LaMICtal) tablet 150 mg  150 mg Oral BID Nissa Morris APRN CNP   150 mg at 04/04/25 0819    levothyroxine (SYNTHROID/LEVOTHROID) half-tab 12.5 mcg  12.5 mcg Oral QAM AC Nissa Morris APRN CNP   12.5 mcg at 04/04/25 0819    metFORMIN (GLUCOPHAGE) tablet 1,000 mg  1,000 mg Oral BID w/meals Nissa Morris APRN CNP   1,000 mg at 04/04/25 0819    OLANZapine (zyPREXA) tablet 15 mg  15 mg Oral At Bedtime Nissa Morris APRN CNP   15 mg at 04/03/25 2054    prazosin (MINIPRESS) capsule 2 mg  2 " "mg Oral At Bedtime Nissa Morris APRN CNP   2 mg at 04/03/25 2054    traZODone (DESYREL) tablet 300 mg  300 mg Oral At Bedtime Nissa Morris APRN CNP   300 mg at 04/03/25 2054          Allergies:   No Known Allergies       Labs:   No results found for this or any previous visit (from the past 24 hours).       Psychiatric Examination:     /84   Pulse 102   Temp 97.4  F (36.3  C) (Oral)   Resp 16   Ht 1.651 m (5' 5\")   Wt (!) 144.2 kg (317 lb 14.4 oz)   SpO2 95%   BMI 52.90 kg/m    Weight is 317 lbs 14.4 oz  Body mass index is 52.9 kg/m .    Orthostatic Vitals         Most Recent      Sitting Orthostatic /80 04/04 0749    Sitting Orthostatic Pulse (bpm) 91 04/04 0749    Standing Orthostatic /79 04/04 0749    Standing Orthostatic Pulse (bpm) 106 04/04 0749          Appearance: awake, alert, dressed in hospital scrubs, appeared as age stated, and morbidly obese  Attitude:  more cooperative  Eye Contact:  fair  Mood:  anxious and sad   Affect:  constricted mobility  Speech:  clear, coherent and decreased prosody  Psychomotor Behavior:  no evidence of tardive dyskinesia, dystonia, or tics  Throught Process:  linear and goal oriented  Associations:  no loose associations  Thought Content:  passive suicidal ideation present and auditory hallucinations present  Insight:  limited  Judgement:  limited  Oriented to:  time, person, and place  Attention Span and Concentration:  limited  Recent and Remote Memory:  fair    Clinical Global Impressions  First: 7/4 4/4/2025      Most recent:            Precautions:     Behavioral Orders   Procedures    Code 1 - Restrict to Unit    Routine Programming     As clinically indicated    Self Injury Precaution    Status 15     Every 15 minutes.    Status Individual Observation     Patient SIO status reviewed with team/RN.  Please also refer to RN/team documentation for add'l detail.    -SIO staff to monitor following which have contributed to " patient being on SIO:  Self-injury by cutting.  On [facility] unit, she stabbed her wrist with a plastic fork.  She has suicidal ideation and is unable to contract for safety.  -Possible interventions SIO staff could use to support patient's treatment progress:  Ensure no access to potentially unsafe objects.  Finger foods.  Offer coping skills.  -When following observed, team will review discontinuation of SIO:  Patient consistently maintains safe behavior and contracts for safety.     Order Specific Question:   CONTINUOUS 24 hours / day     Answer:   5 feet     Order Specific Question:   Indications for SIO     Answer:   Suicide risk     Order Specific Question:   Indications for SIO     Answer:   Self-injury risk    Suicide precautions: Suicide Risk: HIGH; Clinical rationale to override score: Exhibiting Suicidal/self-harm behaviors or thoughts     Patients on Suicide Precautions should have a Combination Diet ordered that includes a Diet selection(s) AND a Behavioral Tray selection for Safe Tray - with utensils, or Safe Tray - NO utensils       Order Specific Question:   Suicide Risk     Answer:   HIGH     Order Specific Question:   Clinical rationale to override score:     Answer:   Exhibiting Suicidal/self-harm behaviors or thoughts          DIagnoses:     Borderline Personality Disorder  Major Depressive Disorder Recurrent with Psychosis         Plan:     We decreased Zyprexa and started Abilify yesterday. No medication changes today 4/4/2025. Will as per discussion above continue on SIO and NO roommate order. Will continue to provide support and structure.      Total time spent was 42 minutes. Over 50% of times was spent counseling and coordination of care regarding coping skills, medication and discharge planning.

## 2025-04-04 NOTE — PLAN OF CARE
Goal Outcome Evaluation:    NOC Shift Report    Pt was in bed at the beginning of the shift. Breathing was regular, spontaneous, and unlabored. Pt did not present with any medical concerns this shift. There were no  PRNs given. SIO 1:1 for suicide and self-injury risk. The plan of care is ongoing.       Pt appeared to sleep for 6.5 hours.

## 2025-04-04 NOTE — PLAN OF CARE

## 2025-04-05 PROCEDURE — 124N000002 HC R&B MH UMMC

## 2025-04-05 PROCEDURE — 250N000013 HC RX MED GY IP 250 OP 250 PS 637: Performed by: PSYCHIATRY & NEUROLOGY

## 2025-04-05 PROCEDURE — 250N000013 HC RX MED GY IP 250 OP 250 PS 637: Performed by: NURSE PRACTITIONER

## 2025-04-05 RX ORDER — POLYETHYLENE GLYCOL 3350 17 G/17G
17 POWDER, FOR SOLUTION ORAL DAILY PRN
Status: DISCONTINUED | OUTPATIENT
Start: 2025-04-05 | End: 2025-04-24 | Stop reason: HOSPADM

## 2025-04-05 RX ADMIN — SENNOSIDES AND DOCUSATE SODIUM 1 TABLET: 50; 8.6 TABLET ORAL at 08:44

## 2025-04-05 RX ADMIN — OLANZAPINE 10 MG: 10 TABLET, FILM COATED ORAL at 12:21

## 2025-04-05 RX ADMIN — TRAZODONE HYDROCHLORIDE 300 MG: 150 TABLET ORAL at 21:04

## 2025-04-05 RX ADMIN — CLONAZEPAM 1 MG: 0.5 TABLET ORAL at 08:02

## 2025-04-05 RX ADMIN — METFORMIN HYDROCHLORIDE 1000 MG: 500 TABLET, FILM COATED ORAL at 08:03

## 2025-04-05 RX ADMIN — POLYETHYLENE GLYCOL 3350 17 G: 17 POWDER, FOR SOLUTION ORAL at 19:57

## 2025-04-05 RX ADMIN — DOCUSATE SODIUM 100 MG: 100 CAPSULE, LIQUID FILLED ORAL at 08:03

## 2025-04-05 RX ADMIN — ARIPIPRAZOLE 5 MG: 5 TABLET ORAL at 08:04

## 2025-04-05 RX ADMIN — CLONAZEPAM 1 MG: 0.5 TABLET ORAL at 21:04

## 2025-04-05 RX ADMIN — LAMOTRIGINE 150 MG: 100 TABLET ORAL at 08:03

## 2025-04-05 RX ADMIN — Medication 12.5 MCG: at 08:04

## 2025-04-05 RX ADMIN — LAMOTRIGINE 150 MG: 100 TABLET ORAL at 21:04

## 2025-04-05 RX ADMIN — METFORMIN HYDROCHLORIDE 1000 MG: 500 TABLET, FILM COATED ORAL at 17:39

## 2025-04-05 RX ADMIN — OLANZAPINE 15 MG: 15 TABLET, FILM COATED ORAL at 21:04

## 2025-04-05 RX ADMIN — DOCUSATE SODIUM 100 MG: 100 CAPSULE, LIQUID FILLED ORAL at 21:04

## 2025-04-05 RX ADMIN — CLONAZEPAM 1 MG: 0.5 TABLET ORAL at 13:31

## 2025-04-05 RX ADMIN — PRAZOSIN HYDROCHLORIDE 2 MG: 2 CAPSULE ORAL at 21:04

## 2025-04-05 RX ADMIN — SENNOSIDES AND DOCUSATE SODIUM 1 TABLET: 50; 8.6 TABLET ORAL at 19:57

## 2025-04-05 RX ADMIN — HYDROXYZINE HYDROCHLORIDE 50 MG: 50 TABLET, FILM COATED ORAL at 05:58

## 2025-04-05 RX ADMIN — OLANZAPINE 10 MG: 10 TABLET, FILM COATED ORAL at 17:43

## 2025-04-05 ASSESSMENT — ACTIVITIES OF DAILY LIVING (ADL)
ADLS_ACUITY_SCORE: 46
ADLS_ACUITY_SCORE: 45
ADLS_ACUITY_SCORE: 56
ADLS_ACUITY_SCORE: 46
ADLS_ACUITY_SCORE: 56
HYGIENE/GROOMING: INDEPENDENT
DRESS: INDEPENDENT
ORAL_HYGIENE: INDEPENDENT
ADLS_ACUITY_SCORE: 46
ADLS_ACUITY_SCORE: 46
ADLS_ACUITY_SCORE: 56
ADLS_ACUITY_SCORE: 46
ADLS_ACUITY_SCORE: 45
ADLS_ACUITY_SCORE: 46
ADLS_ACUITY_SCORE: 45
LAUNDRY: WITH SUPERVISION
ADLS_ACUITY_SCORE: 46
ADLS_ACUITY_SCORE: 46
ADLS_ACUITY_SCORE: 56
ADLS_ACUITY_SCORE: 46
ADLS_ACUITY_SCORE: 56
ADLS_ACUITY_SCORE: 46
DRESS: INDEPENDENT
ADLS_ACUITY_SCORE: 56
ADLS_ACUITY_SCORE: 46
ORAL_HYGIENE: INDEPENDENT
ADLS_ACUITY_SCORE: 46
HYGIENE/GROOMING: INDEPENDENT

## 2025-04-05 NOTE — PLAN OF CARE
"Patient has spent parts of the shift in the milieu and other times in her room. Did not attend the evening group. Patient at the start of the shift took off her bandage and began to rub and pick at her scratches. Did not break the skin and no Writer spent time with patient and her SIO talking to her and trying to figure out ways to keep her safe. Patient stated that the voices were telling her to kill herself but she knew she wouldn't be able to accomplish that here. Patient stated she was depressed and anxious. Received prn hydroxyzine and zyprexa which later she said was helpful. Affect was flat and keeps to herself.  Med compliant.     Patient evaluation continues. Assessed mood,anxiety,thoughts and behavior.     Patient gradually progressing towards goals.    Patient is encouraged to participate in groups and assisted to develop healthy coping skills.     VS reviewed: /81   Pulse 97   Temp 97.9  F (36.6  C) (Temporal)   Resp 16   Ht 1.651 m (5' 5\")   Wt (!) 144.2 kg (317 lb 14.4 oz)   SpO2 95%   BMI 52.90 kg/m      Length of stay: 1    Refer to daily team meeting notes for individualized plan of care. Nursing will continue to assess.    "

## 2025-04-05 NOTE — PLAN OF CARE
Problem: Sleep Disturbance  Goal: Adequate Sleep/Rest  Outcome: Progressing  Intervention: Promote Sleep/Rest  Flowsheets (Taken 4/5/2025 0407)  Sleep/Rest Enhancement:   awakenings minimized   comfort measures   regular sleep/rest pattern promoted   noise level reduced   room darkened       Patient on SIO 5 feet for suicide and self-injury risk. She appeared to sleep 5.75 hours this shift. Went to the BR x1. Woke up at past 0500 and began scratching left wrist. Pt was redirected by SIO staff but she didn't stop scratching self. SIO staff alerted nurse.  Writer went to check on pt. Pt said she's a little bit anxious. Offered PRN Hydroxyzine 50 mg and she agreed to take it at 0558. She was playing with the pop[employer] toy. Later, was back resting in bed. Cleansed skin and applied Bacitracin ointment. Will continue to closely monitor pt.

## 2025-04-06 PROCEDURE — 124N000002 HC R&B MH UMMC

## 2025-04-06 PROCEDURE — 250N000013 HC RX MED GY IP 250 OP 250 PS 637: Performed by: NURSE PRACTITIONER

## 2025-04-06 PROCEDURE — 250N000013 HC RX MED GY IP 250 OP 250 PS 637: Performed by: PSYCHIATRY & NEUROLOGY

## 2025-04-06 RX ADMIN — DOCUSATE SODIUM 100 MG: 100 CAPSULE, LIQUID FILLED ORAL at 08:38

## 2025-04-06 RX ADMIN — SENNOSIDES AND DOCUSATE SODIUM 1 TABLET: 50; 8.6 TABLET ORAL at 20:58

## 2025-04-06 RX ADMIN — METFORMIN HYDROCHLORIDE 1000 MG: 500 TABLET, FILM COATED ORAL at 17:47

## 2025-04-06 RX ADMIN — DOCUSATE SODIUM 100 MG: 100 CAPSULE, LIQUID FILLED ORAL at 20:58

## 2025-04-06 RX ADMIN — OLANZAPINE 15 MG: 15 TABLET, FILM COATED ORAL at 20:58

## 2025-04-06 RX ADMIN — CLONAZEPAM 1 MG: 0.5 TABLET ORAL at 13:37

## 2025-04-06 RX ADMIN — POLYETHYLENE GLYCOL 3350 17 G: 17 POWDER, FOR SOLUTION ORAL at 16:43

## 2025-04-06 RX ADMIN — CLONAZEPAM 1 MG: 0.5 TABLET ORAL at 20:58

## 2025-04-06 RX ADMIN — OLANZAPINE 10 MG: 10 TABLET, FILM COATED ORAL at 13:37

## 2025-04-06 RX ADMIN — PRAZOSIN HYDROCHLORIDE 2 MG: 2 CAPSULE ORAL at 20:59

## 2025-04-06 RX ADMIN — CLONAZEPAM 1 MG: 0.5 TABLET ORAL at 08:38

## 2025-04-06 RX ADMIN — LAMOTRIGINE 150 MG: 100 TABLET ORAL at 20:58

## 2025-04-06 RX ADMIN — ARIPIPRAZOLE 5 MG: 5 TABLET ORAL at 10:35

## 2025-04-06 RX ADMIN — LAMOTRIGINE 150 MG: 100 TABLET ORAL at 08:38

## 2025-04-06 RX ADMIN — Medication 12.5 MCG: at 07:15

## 2025-04-06 RX ADMIN — TRAZODONE HYDROCHLORIDE 300 MG: 150 TABLET ORAL at 20:58

## 2025-04-06 RX ADMIN — HYDROXYZINE HYDROCHLORIDE 50 MG: 50 TABLET, FILM COATED ORAL at 10:41

## 2025-04-06 RX ADMIN — OLANZAPINE 10 MG: 10 TABLET, FILM COATED ORAL at 06:33

## 2025-04-06 RX ADMIN — HYDROXYZINE HYDROCHLORIDE 50 MG: 50 TABLET, FILM COATED ORAL at 05:51

## 2025-04-06 RX ADMIN — METFORMIN HYDROCHLORIDE 1000 MG: 500 TABLET, FILM COATED ORAL at 08:38

## 2025-04-06 RX ADMIN — SENNOSIDES AND DOCUSATE SODIUM 1 TABLET: 50; 8.6 TABLET ORAL at 08:41

## 2025-04-06 ASSESSMENT — ACTIVITIES OF DAILY LIVING (ADL)
ADLS_ACUITY_SCORE: 46
LAUNDRY: WITH SUPERVISION
HYGIENE/GROOMING: INDEPENDENT
ADLS_ACUITY_SCORE: 46
ADLS_ACUITY_SCORE: 46
DRESS: INDEPENDENT
ADLS_ACUITY_SCORE: 46
ORAL_HYGIENE: INDEPENDENT
ADLS_ACUITY_SCORE: 46
ORAL_HYGIENE: INDEPENDENT
ADLS_ACUITY_SCORE: 46
HYGIENE/GROOMING: INDEPENDENT
ADLS_ACUITY_SCORE: 46
ADLS_ACUITY_SCORE: 46
DRESS: INDEPENDENT
ADLS_ACUITY_SCORE: 46

## 2025-04-06 NOTE — PLAN OF CARE
"Goal Outcome Evaluation:           Problem: Sleep Disturbance  Goal: Adequate Sleep/Rest  Outcome: Progressing            Patient continues to be on SIO with 1:1 staffing for safety as ordered. Patient appeared to be asleep for 5.5 during this shift. Writer was notified that patient was engaging in self-injurious behaviors, by scratching. Charge nurse had tried to deescalate patient and administered prn medication to patient. Left wrist was noted to have band aid applied to left wrist. Writer tried to deescalate patient as well through therapeutic communication. She was noted to be scratching her left finger. Patient said \"I just want to feel the pain\". Patient said the angels are telling her to harm herself. She was insightful, telling writer that she knows she should not harm herself  and that it was \"selfish\" to self-harm, but she struggled with the angels. Patient said she likes to listen to music to help her distract herself. Head phones was given to patient which was helpful. Offered snacks, but patient said she had already had snacks. Patient's rubber fidget was given to her, which was helpful. After sometime, patient took off bandaid and started to scratch again after stopping. Areas on left wrist and left finger were red, but not bleeding. Left wrist and left finger cleaned. Bandaids applied to both area. PRN Zyprexa administered. More therapeutic communication done, which was helpful. Patient stopped self-harming. Informed patient that staff is available to talk to when she is hearing the angels. She was encouraged to self-motivate herself not to self-harm from her insights of knowing that it was \"selfish\". Patient said will do. Patient smiled with staff. SIO staff continues to be with patient for safety.    "

## 2025-04-06 NOTE — PLAN OF CARE
"Patient has spent time in the milieu and walking up and down the covarrubias. Also spent time in her room resting or listening to music. Continues to have command hallucinations telling her to harm herself. Has suicidal ideation and self injurious thoughts with no plan stated. Reports having anxiety and depression. Affect is flat and guarded. Requested prn zyprexa for voices. Med compliant. Ate dinner and snack. Cooperative on the unit.     Patient evaluation continues. Assessed mood,anxiety,thoughts and behavior.     Patient gradually progressing towards goals.    Patient is encouraged to participate in groups and assisted to develop healthy coping skills.     VS reviewed: /85   Pulse 95   Temp 98.4  F (36.9  C) (Oral)   Resp 16   Ht 1.651 m (5' 5\")   Wt (!) 144.2 kg (317 lb 14.4 oz)   SpO2 100%   BMI 52.90 kg/m      Length of stay: 2    Refer to daily team meeting notes for individualized plan of care. Nursing will continue to assess.        "

## 2025-04-06 NOTE — PLAN OF CARE
"  Problem: Depressive Signs/Symptoms  Goal: Improved Mood Symptoms (Depressive Signs/Symptoms)  Outcome: Progressing   Goal Outcome Evaluation:         Pt. Spent half the shift resting in bed, listening to music, intermittently picking at her left finger, pt. initially refused to wear a bandage, later agreed. Pt. Redirected to lounge and encouraged to exercise and join unit activities, given fidget toys to provide an outlet for restless energy, pt reports it helping with her urges to cut, along with pacing the halls. Pt presents with a depressed and guarded affect, hypoactive quiet and withdrawn, minimal engagement with peers. Visible in the lounge for meals eat 100% of breakfast and lunch.         Pt. Endorses auditory hallucinations, making negative statement. Endorses strong SIB urges to cut, denies HI. Pt rates anxiety and depression as high PRN Hydroxyzine helpful. Pt C/O of feeling constipated PRN senna administered.    Late afternoon pt. Removed her bandage, started picking at her superficial scratches left forearm, quote \"I will continue to remove my bandage\" orange rubber band placed around her wrist to snap, pt. Is on a SIO for self injury     Blood pressure 111/79, pulse 96, temperature 97.4  F (36.3  C), temperature source Oral, resp. rate 18, height 1.651 m (5' 5\"), weight (!) 144.6 kg (318 lb 11.2 oz), SpO2 93%, not currently breastfeeding.            "

## 2025-04-06 NOTE — PLAN OF CARE
"  Problem: Suicide Risk  Goal: Absence of Self-Harm  Outcome: Not Progressing   Goal Outcome Evaluation:             Writer received report from SIO staff patient was engaging in self-harm by scratching self. Writer assessed patient, attempted to vertebrally de-escalate patient. Pt told writer \"I just want to feel pain\". Pt was noted scratching her left wrist. Pt was given hydroxyzine 50 mg for anxiety and self harm urges. Band aid applied to abrasions on left wrist. Pt's nurse was notified. Will continue to monitor and provide support and redirection as needed.           "

## 2025-04-07 PROCEDURE — 250N000013 HC RX MED GY IP 250 OP 250 PS 637: Performed by: PSYCHIATRY & NEUROLOGY

## 2025-04-07 PROCEDURE — 250N000013 HC RX MED GY IP 250 OP 250 PS 637: Performed by: NURSE PRACTITIONER

## 2025-04-07 PROCEDURE — 99233 SBSQ HOSP IP/OBS HIGH 50: CPT | Performed by: PSYCHIATRY & NEUROLOGY

## 2025-04-07 PROCEDURE — 124N000002 HC R&B MH UMMC

## 2025-04-07 RX ORDER — CITALOPRAM HYDROBROMIDE 10 MG/1
20 TABLET ORAL DAILY
Status: DISCONTINUED | OUTPATIENT
Start: 2025-04-07 | End: 2025-04-15

## 2025-04-07 RX ORDER — BISACODYL 10 MG
10 SUPPOSITORY, RECTAL RECTAL DAILY PRN
Status: DISCONTINUED | OUTPATIENT
Start: 2025-04-07 | End: 2025-04-24 | Stop reason: HOSPADM

## 2025-04-07 RX ADMIN — MAGNESIUM HYDROXIDE 30 ML: 400 SUSPENSION ORAL at 18:16

## 2025-04-07 RX ADMIN — CITALOPRAM HYDROBROMIDE 20 MG: 10 TABLET ORAL at 16:00

## 2025-04-07 RX ADMIN — CLONAZEPAM 1 MG: 0.5 TABLET ORAL at 08:13

## 2025-04-07 RX ADMIN — SENNOSIDES AND DOCUSATE SODIUM 1 TABLET: 50; 8.6 TABLET ORAL at 08:15

## 2025-04-07 RX ADMIN — MICONAZOLE NITRATE: 20 POWDER TOPICAL at 20:39

## 2025-04-07 RX ADMIN — OLANZAPINE 10 MG: 10 TABLET, FILM COATED ORAL at 16:01

## 2025-04-07 RX ADMIN — METFORMIN HYDROCHLORIDE 1000 MG: 500 TABLET, FILM COATED ORAL at 08:13

## 2025-04-07 RX ADMIN — OLANZAPINE 15 MG: 15 TABLET, FILM COATED ORAL at 20:39

## 2025-04-07 RX ADMIN — PRAZOSIN HYDROCHLORIDE 2 MG: 2 CAPSULE ORAL at 20:38

## 2025-04-07 RX ADMIN — CLONAZEPAM 1 MG: 0.5 TABLET ORAL at 13:27

## 2025-04-07 RX ADMIN — Medication 12.5 MCG: at 08:13

## 2025-04-07 RX ADMIN — DOCUSATE SODIUM 100 MG: 100 CAPSULE, LIQUID FILLED ORAL at 20:38

## 2025-04-07 RX ADMIN — SENNOSIDES AND DOCUSATE SODIUM 1 TABLET: 50; 8.6 TABLET ORAL at 20:40

## 2025-04-07 RX ADMIN — ARIPIPRAZOLE 5 MG: 5 TABLET ORAL at 08:13

## 2025-04-07 RX ADMIN — TRAZODONE HYDROCHLORIDE 300 MG: 150 TABLET ORAL at 20:38

## 2025-04-07 RX ADMIN — DOCUSATE SODIUM 100 MG: 100 CAPSULE, LIQUID FILLED ORAL at 08:14

## 2025-04-07 RX ADMIN — CLONAZEPAM 1 MG: 0.5 TABLET ORAL at 20:37

## 2025-04-07 RX ADMIN — METFORMIN HYDROCHLORIDE 1000 MG: 500 TABLET, FILM COATED ORAL at 18:00

## 2025-04-07 RX ADMIN — LAMOTRIGINE 150 MG: 100 TABLET ORAL at 08:13

## 2025-04-07 RX ADMIN — LAMOTRIGINE 150 MG: 100 TABLET ORAL at 20:38

## 2025-04-07 RX ADMIN — POLYETHYLENE GLYCOL 3350 17 G: 17 POWDER, FOR SOLUTION ORAL at 08:22

## 2025-04-07 RX ADMIN — HYDROXYZINE HYDROCHLORIDE 50 MG: 50 TABLET, FILM COATED ORAL at 16:00

## 2025-04-07 ASSESSMENT — ACTIVITIES OF DAILY LIVING (ADL)
ADLS_ACUITY_SCORE: 56
HYGIENE/GROOMING: INDEPENDENT
ADLS_ACUITY_SCORE: 46
ADLS_ACUITY_SCORE: 56
HYGIENE/GROOMING: INDEPENDENT
ADLS_ACUITY_SCORE: 56
ADLS_ACUITY_SCORE: 46
ADLS_ACUITY_SCORE: 46
ADLS_ACUITY_SCORE: 56
DRESS: PROMPTS
LAUNDRY: WITH SUPERVISION
ADLS_ACUITY_SCORE: 56
ADLS_ACUITY_SCORE: 46
ADLS_ACUITY_SCORE: 56
ADLS_ACUITY_SCORE: 46
DRESS: INDEPENDENT
ADLS_ACUITY_SCORE: 46
ADLS_ACUITY_SCORE: 46
ORAL_HYGIENE: PROMPTS
ADLS_ACUITY_SCORE: 46
ADLS_ACUITY_SCORE: 46
ADLS_ACUITY_SCORE: 56
ADLS_ACUITY_SCORE: 46
ADLS_ACUITY_SCORE: 46
ADLS_ACUITY_SCORE: 56
ADLS_ACUITY_SCORE: 46
ORAL_HYGIENE: INDEPENDENT
ADLS_ACUITY_SCORE: 56
ADLS_ACUITY_SCORE: 46
ADLS_ACUITY_SCORE: 46

## 2025-04-07 NOTE — PLAN OF CARE
Team Note Due:  Thursday    Assessment/Intervention/Current Symtoms and Care Coordination:  Chart review and met with team, discussed pt progress, symptomology, and response to treatment.  Discussed the discharge plan and any potential impediments to discharge.      Team Meeting  Pt has urges to SIB and picks at her wounds. MD is recommending mittens. MD asked RN to obtain these/    I saw pt eating lunch at lounge table. Food was on cardboard and not a cafeteria tray.  I commented that it was good to see in the lounge.    Petitioning  for Recommitment:  Exam:  @ 2:15  Hearin/14       Discharge Plan or Goal:  [facility]     Barriers to Discharge:  Too symptomatic     Referral Status:  Has all established providers     Legal Status:  There is no [place].  [id number]   Order for Commitment As A Person Who Poses A Risk of Hrm Due to A Mental Illness  Dated: 2/15/24 to 24  PD'd on 3/1/24  Order for Continued Commitment   24 to 25    On a provisional discharge that is not revoked so is volntary  Under Commitment and [place]  Is in the process of being recommitted     [facility]: [place]  File Number: New recommittment is   Start and expiration date of commitment:     Exam:  @ 2:15  Hearin/14    Contacts (include GEORGI status):          Upcoming Meetings and Dates/Important Information and next steps:

## 2025-04-07 NOTE — PLAN OF CARE

## 2025-04-07 NOTE — PLAN OF CARE
"  Problem: Depressive Signs/Symptoms  Goal: Improved Mood Symptoms (Depressive Signs/Symptoms)  Intervention: Promote Mood Improvement  Recent Flowsheet Documentation  Taken 4/7/2025 0927 by Bernie Mckeon RN  Supportive Measures:   self-care encouraged   self-responsibility promoted   active listening utilized   Goal Outcome Evaluation:         Pt visible in the lounge listening to music and watching TV, isolative and withdrawn, Pt endorses SIB urges to cut, new superficial scratches on right wrist. Pt refused bandages and PRN medications stating she less tense and nervous today, rubber band and fidget toys are helping with her nervous energy. Attended pet therapy group    Pt. Reports not having a BM x 4 days,  PRN Senna and Miralax administered AM pushing liquids, NO results. Refused Suppository in the afternoon wanting to wait.     Pt endorses anxiety and depression as high, endorses auditory hallucinations of angels talking to her, reports listening to music helping. denies HI. Medication compliance.     Late afternoon pt. States that she has something hidden in her room, room check x 2 no object found.    Blood pressure 107/77, pulse 91, temperature 97.9  F (36.6  C), temperature source Oral, resp. rate 18, height 1.651 m (5' 5\"), weight (!) 144.6 kg (318 lb 11.2 oz), SpO2 94%, not currently breastfeeding.                  "

## 2025-04-07 NOTE — PLAN OF CARE
"Patient has spent parts of the shift in her room and other times in the milieu. Listened to music and paced up and down the covarrubias. States that the voices were lower and did not request prn's. Continues to have urges to hurt herself but was able to distract herself this evening. Continues to be on a 24 hour SIO. States that she hasn't had a bowl movement in about 4 days. Was given 8 ounces of prune juice, senna and miralax prn. Reports being anxious and depressed. Med compliant. Ate dinner and snack. Pleasant and cooperative.      Patient evaluation continues. Assessed mood,anxiety,thoughts and behavior.     Patient gradually progressing towards goals.    Patient is encouraged to participate in groups and assisted to develop healthy coping skills.     VS reviewed: /78 (BP Location: Right arm, Patient Position: Sitting, Cuff Size: Adult Large)   Pulse 97   Temp 98.1  F (36.7  C) (Temporal)   Resp 18   Ht 1.651 m (5' 5\")   Wt (!) 144.6 kg (318 lb 11.2 oz)   SpO2 94%   BMI 53.03 kg/m      Length of stay: 3    Refer to daily team meeting notes for individualized plan of care. Nursing will continue to assess.    "

## 2025-04-07 NOTE — PLAN OF CARE
Problem: Sleep Disturbance  Goal: Adequate Sleep/Rest  Outcome: Progressing   Goal Outcome Evaluation:    NOC Shift Report    Pt was in bed at the beginning of the shift. Breathing was regular, spontaneous, and unlabored. Pt did not present with any medical concerns this shift. SIO For SI/SIB. There were no  PRNs given. The plan of care is ongoing.       Pt appears to sleep 6.5 hours

## 2025-04-07 NOTE — PROGRESS NOTES
"[facility], [place]   Psychiatric Progress Note        Interim History:   The patient's care was discussed with the treatment team during the daily team meeting and/or staff's chart notes were reviewed.  Staff report patient slept 5.75 hours. Pt continues to scratch her wrist. Isolates in her room. Was intermittently seen in the milieu. Continue to report constipation. See Rn note below.      RN note on 4/7/25: Pt visible in the lounge listening to music and watching TV, isolative and withdrawn, Pt endorses SIB urges to cut, new superficial scratches on right wrist. Pt refused bandages and PRN medications stating she less tense and nervous today, rubber band and fidget toys are helping with her nervous energy. Attended pet therapy group. Pt. Reports not having a BM x 4 days,  PRN Senna and Miralax administered AM pushing liquids, NO results. Refused Suppository in the afternoon wanting to wait. Pt endorses anxiety and depression as high, endorses auditory hallucinations of angels talking to her, reports listening to music helping. denies HI. Medication compliance. Late afternoon pt. States that she has something hidden in her room, room check x 2 no object found.\"    Met with patient with student in the conference room. Pt was dressed in hospital scrubs. Pt endorsed depression and anxiety. She stated that she is suicidal with plan to cut herself on the wrist. She states that today she did not ask for any PRNs because she has a sharp object in her room that she has been using to cut her right wrist. She added that whenever she has something to hurt herself she does not ask for PRNs. She continued to say that she has no way of stopping to cut herself because she likes the pain and the burning sensation. She reports that she is always looking for ways to hurst herself. She reports liking to bottle her feelings and not sharing them and remaining isolated. We discussed some of the ways she " can keep herself distracted such as listening to music, attending groups, being out the milieu, and interacting with peers and staff. We also reassured her that medications do help with depression but not cutting self, she also needs to do her part of avoiding cutting and being destructed with unit activities. We discussed with pt that if she continues to cut her wrist we will recommend mittens to prevent self cutting. We discussed with pt about starting Celexa and she was agreeable. Pt had no further questions. She agreed with us that she would be better outside of hospital, but was unable to say how to keep self safe outside.         Medications:     Current Facility-Administered Medications   Medication Dose Route Frequency Provider Last Rate Last Admin    ARIPiprazole (ABILIFY) tablet 5 mg  5 mg Oral Daily Jd Jones MD   5 mg at 04/07/25 0813    citalopram (celeXA) tablet 20 mg  20 mg Oral Daily Jd Jones MD   20 mg at 04/07/25 1600    clonazePAM (klonoPIN) tablet 1 mg  1 mg Oral TID Nissa Morris APRN CNP   1 mg at 04/07/25 1327    docusate sodium (COLACE) capsule 100 mg  100 mg Oral BID Nissa Morris APRN CNP   100 mg at 04/07/25 0814    lamoTRIgine (LaMICtal) tablet 150 mg  150 mg Oral BID Nissa Morris APRN CNP   150 mg at 04/07/25 0813    levothyroxine (SYNTHROID/LEVOTHROID) half-tab 12.5 mcg  12.5 mcg Oral QAM AC Nissa Morris APRN CNP   12.5 mcg at 04/07/25 0813    metFORMIN (GLUCOPHAGE) tablet 1,000 mg  1,000 mg Oral BID w/meals Nissa Morris APRN CNP   1,000 mg at 04/07/25 0813    miconazole (MICATIN) 2 % powder   Topical BID Jd Jones MD        OLANZapine (zyPREXA) tablet 15 mg  15 mg Oral At Bedtime Nissa Morris APRN CNP   15 mg at 04/06/25 2058    prazosin (MINIPRESS) capsule 2 mg  2 mg Oral At Bedtime Nissa Morris APRN CNP   2 mg at 04/06/25 2059    traZODone (DESYREL) tablet 300 mg  300 mg  "Oral At Bedtime Nissa Morris, APRN CNP   300 mg at 04/06/25 2058          Allergies:   No Known Allergies       Labs:   No results found for this or any previous visit (from the past 24 hours).       Psychiatric Examination:     /77 (BP Location: Right arm, Patient Position: Supine, Cuff Size: Adult Large)   Pulse 91   Temp 97.9  F (36.6  C) (Oral)   Resp 18   Ht 1.651 m (5' 5\")   Wt (!) 144.6 kg (318 lb 11.2 oz)   SpO2 94%   BMI 53.03 kg/m    Weight is 318 lbs 11.2 oz  Body mass index is 53.03 kg/m .    Orthostatic Vitals       None            Appearance: awake, alert, dressed in hospital scrubs, appeared as age stated, and morbidly obese  Attitude:  more cooperative  Eye Contact:  fair  Mood:  anxious and sad   Affect:  constricted mobility; labile  Speech:  soft, coherent and decreased prosody  Psychomotor Behavior:  no evidence of tardive dyskinesia, dystonia, or tics  Throught Process: linear  Associations:  no loose associations  Thought Content:  active suicidal ideation with plan to cut self present and endorses  auditory hallucinations of angels speaking to her, denies visual hallucinations  Insight:  limited  Judgement:  limited  Oriented to:  time, person, and place  Attention Span and Concentration:  limited  Recent and Remote Memory:  fair  Clinical Global Impressions  First:7/4 4/4/2025     Most recent:            Precautions:     Behavioral Orders   Procedures    Code 1 - Restrict to Unit    Routine Programming     As clinically indicated    Self Injury Precaution    Status 15     Every 15 minutes.    Status Individual Observation     Patient SIO status reviewed with team/RN.  Please also refer to RN/team documentation for add'l detail.    -SIO staff to monitor following which have contributed to patient being on SIO:  Self-injury by cutting.  On [facility] unit, she stabbed her wrist with a plastic fork.  She has suicidal ideation and is unable to contract for safety.  -Possible " interventions SIO staff could use to support patient's treatment progress:  Ensure no access to potentially unsafe objects.  Finger foods.  Offer coping skills.  -When following observed, team will review discontinuation of SIO:  Patient consistently maintains safe behavior and contracts for safety.     Order Specific Question:   CONTINUOUS 24 hours / day     Answer:   5 feet     Order Specific Question:   Indications for SIO     Answer:   Suicide risk     Order Specific Question:   Indications for SIO     Answer:   Self-injury risk    Suicide precautions: Suicide Risk: HIGH; Clinical rationale to override score: Exhibiting Suicidal/self-harm behaviors or thoughts     Patients on Suicide Precautions should have a Combination Diet ordered that includes a Diet selection(s) AND a Behavioral Tray selection for Safe Tray - with utensils, or Safe Tray - NO utensils       Order Specific Question:   Suicide Risk     Answer:   HIGH     Order Specific Question:   Clinical rationale to override score:     Answer:   Exhibiting Suicidal/self-harm behaviors or thoughts          DIagnoses:     Borderline Personality Disorder  Major Depressive Disorder Recurrent with Psychosis         Plan:     We discussed with pt about the benefits and risks of Celexa and she was agreeable on starting Celexa 20 mg Daily. She also started on Micatin powder 2% for rash under the breast. Zyprexa was decreased on 4/3/2025. Pt has a court for petition of recommitment on 4/9 at 2:15 pm and hearing on 4/14.  Will as per discussion above continue on SIO and NO roommate order. Will continue to provide support and structure. Plan is to return to the [facility] at [facility].           I was present with PA student who participated in the service and in the documentation of the note. I have verified the history and personally performed the physical exam and medical decision making. I agree with the assessment and plan of care as documented in  the note.     Jd Jones MD  [facility] Psychiatry     Total time spent was 40 minutes. Over 50% of times was spent counseling and coordination of care regarding coping skills, medication and discharge planning.

## 2025-04-08 ENCOUNTER — MEDICAL CORRESPONDENCE (OUTPATIENT)
Dept: HEALTH INFORMATION MANAGEMENT | Facility: CLINIC | Age: 40
End: 2025-04-08

## 2025-04-08 PROCEDURE — 250N000013 HC RX MED GY IP 250 OP 250 PS 637: Performed by: PSYCHIATRY & NEUROLOGY

## 2025-04-08 PROCEDURE — 250N000013 HC RX MED GY IP 250 OP 250 PS 637: Performed by: NURSE PRACTITIONER

## 2025-04-08 PROCEDURE — 99233 SBSQ HOSP IP/OBS HIGH 50: CPT | Performed by: PSYCHIATRY & NEUROLOGY

## 2025-04-08 PROCEDURE — 250N000013 HC RX MED GY IP 250 OP 250 PS 637: Performed by: REGISTERED NURSE

## 2025-04-08 PROCEDURE — 90832 PSYTX W PT 30 MINUTES: CPT | Performed by: COUNSELOR

## 2025-04-08 PROCEDURE — 124N000002 HC R&B MH UMMC

## 2025-04-08 RX ADMIN — TRAZODONE HYDROCHLORIDE 300 MG: 150 TABLET ORAL at 20:40

## 2025-04-08 RX ADMIN — LAMOTRIGINE 150 MG: 100 TABLET ORAL at 20:40

## 2025-04-08 RX ADMIN — DOCUSATE SODIUM 100 MG: 100 CAPSULE, LIQUID FILLED ORAL at 20:40

## 2025-04-08 RX ADMIN — CLONAZEPAM 1 MG: 0.5 TABLET ORAL at 13:29

## 2025-04-08 RX ADMIN — ARIPIPRAZOLE 5 MG: 5 TABLET ORAL at 08:04

## 2025-04-08 RX ADMIN — CLONAZEPAM 1 MG: 0.5 TABLET ORAL at 20:40

## 2025-04-08 RX ADMIN — METFORMIN HYDROCHLORIDE 1000 MG: 500 TABLET, FILM COATED ORAL at 08:04

## 2025-04-08 RX ADMIN — OLANZAPINE 10 MG: 10 TABLET, FILM COATED ORAL at 13:29

## 2025-04-08 RX ADMIN — SENNOSIDES AND DOCUSATE SODIUM 1 TABLET: 50; 8.6 TABLET ORAL at 20:41

## 2025-04-08 RX ADMIN — MAGNESIUM HYDROXIDE 30 ML: 400 SUSPENSION ORAL at 19:53

## 2025-04-08 RX ADMIN — PRAZOSIN HYDROCHLORIDE 2 MG: 2 CAPSULE ORAL at 20:40

## 2025-04-08 RX ADMIN — Medication 12.5 MCG: at 08:04

## 2025-04-08 RX ADMIN — DOCUSATE SODIUM 100 MG: 100 CAPSULE, LIQUID FILLED ORAL at 08:04

## 2025-04-08 RX ADMIN — OLANZAPINE 15 MG: 15 TABLET, FILM COATED ORAL at 20:40

## 2025-04-08 RX ADMIN — CLONAZEPAM 1 MG: 0.5 TABLET ORAL at 08:04

## 2025-04-08 RX ADMIN — MICONAZOLE NITRATE: 20 POWDER TOPICAL at 08:05

## 2025-04-08 RX ADMIN — METFORMIN HYDROCHLORIDE 1000 MG: 500 TABLET, FILM COATED ORAL at 17:45

## 2025-04-08 RX ADMIN — POLYETHYLENE GLYCOL 3350 17 G: 17 POWDER, FOR SOLUTION ORAL at 08:04

## 2025-04-08 RX ADMIN — LAMOTRIGINE 150 MG: 100 TABLET ORAL at 08:04

## 2025-04-08 RX ADMIN — CITALOPRAM HYDROBROMIDE 20 MG: 10 TABLET ORAL at 08:05

## 2025-04-08 ASSESSMENT — ACTIVITIES OF DAILY LIVING (ADL)
ADLS_ACUITY_SCORE: 46
HYGIENE/GROOMING: INDEPENDENT
ADLS_ACUITY_SCORE: 46
HYGIENE/GROOMING: INDEPENDENT
ADLS_ACUITY_SCORE: 46
ADLS_ACUITY_SCORE: 46
DRESS: INDEPENDENT
ADLS_ACUITY_SCORE: 46
LAUNDRY: WITH SUPERVISION
ADLS_ACUITY_SCORE: 46
ORAL_HYGIENE: INDEPENDENT

## 2025-04-08 NOTE — PROGRESS NOTES
[facility], [place]   Psychiatric Progress Note        Interim History:     The patient's care was discussed with the treatment team during the daily team meeting and/or staff's chart notes were reviewed.  Staff report patient slept for 6.25 hours. She continues to spend most time in her room and does not attend groups. She endorsed anxiety and depression PRN Zyprexa was given. Pt continues to have urges for self harm. Yesterday she reported having a sharp object in her room, after thorough search, no object was found. Pt will continue to be on SIO.     Individual Therapy on 4/8/25:   Progress Towards Goals and Assessment of Patient:   Patient is making progress towards treatment goals as evidenced by willingness to engage.   Therapeutic Intervention(s):   Provided active listening, unconditional positive regard, and validation.   Engaged in safety planning identifying coping skills, warning signs, health support and resources, Understand and identify reasons for hospitalization, how to use the hospital to create change and prevent future hospitalizations , Explored motivation for behavioral change, Engaged in cognitive restructuring/ reframing, looked at common cognitive distortions and challenged negative thoughts, Provided positive reinforcement for progress towards goals, gains in knowledge, and application of skills previously taught, Identified stress relief practices, Explored strategies for self-soothing, Reviewed healthy living that supports positive mental health, including looking at sleep hygiene, regular movement, nutrition, and regular socialization, and Introduced and practiced Wise Mind Introduced and practiced urge surfing    Met with patient with student in her room. Pt was sitting on her bed dressed in a hospital scrub. Pt appeared labile. Pt reported that she did cut herself today after breakfast using a sharp object and she won't tell us where the object was. She  said these words smiling. We did tell her that staff did search her room yesterday and changed bed sheets and did not see the sharp object she is talking about. She continued to say she will not say where it is because she likes the pain and burning sensation from the sharp object. This behavior (having an object) seems gamey or attention seeking. She reports continuous urge to cut self. She reports being tired, empty, and hopeless because nothing is working including the several medications she has tried. We talked about discharge plans and she stated that she is ready to discharge back to her group home with a plan to cut herself with a . We openly told her that we could not discharge her to her place with thoughts of using  to harm self and discussed at length the importance of her avoiding cutting and we told her to have small goals in a day such as avoiding cutting in a day and see how it goes. We also encouraged her to get out her room and attend groups and she stated she is unmotivated. Pt reports she waived her commitment exam that was schedule for tomorrow because she felt that there is nothing to appeal about. Pt did not have further questions.          Medications:     Current Facility-Administered Medications   Medication Dose Route Frequency Provider Last Rate Last Admin    ARIPiprazole (ABILIFY) tablet 5 mg  5 mg Oral Daily Jd Jones MD   5 mg at 04/08/25 0804    citalopram (celeXA) tablet 20 mg  20 mg Oral Daily Jd Jones MD   20 mg at 04/08/25 0805    clonazePAM (klonoPIN) tablet 1 mg  1 mg Oral TID Nissa Morris APRN CNP   1 mg at 04/08/25 1329    docusate sodium (COLACE) capsule 100 mg  100 mg Oral BID Nissa Morris APRN CNP   100 mg at 04/08/25 0804    lamoTRIgine (LaMICtal) tablet 150 mg  150 mg Oral BID Nissa Morris APRN CNP   150 mg at 04/08/25 0804    levothyroxine (SYNTHROID/LEVOTHROID) half-tab 12.5 mcg  12.5 mcg  "Oral QAM AC Nissa Morris APRN CNP   12.5 mcg at 04/08/25 0804    metFORMIN (GLUCOPHAGE) tablet 1,000 mg  1,000 mg Oral BID w/meals Nissa Morris APRN CNP   1,000 mg at 04/08/25 0804    miconazole (MICATIN) 2 % powder   Topical BID Jd Jones MD   Given at 04/08/25 0805    OLANZapine (zyPREXA) tablet 15 mg  15 mg Oral At Bedtime Nissa Morris APRN CNP   15 mg at 04/07/25 2039    prazosin (MINIPRESS) capsule 2 mg  2 mg Oral At Bedtime Nissa Morris APRN CNP   2 mg at 04/07/25 2038    traZODone (DESYREL) tablet 300 mg  300 mg Oral At Bedtime Nissa Morris APRN CNP   300 mg at 04/07/25 2038          Allergies:   No Known Allergies       Labs:   No results found for this or any previous visit (from the past 24 hours).       Psychiatric Examination:     /78   Pulse 86   Temp 97.5  F (36.4  C) (Temporal)   Resp 18   Ht 1.651 m (5' 5\")   Wt (!) 144.6 kg (318 lb 11.2 oz)   SpO2 95%   BMI 53.03 kg/m    Weight is 318 lbs 11.2 oz  Body mass index is 53.03 kg/m .    Orthostatic Vitals       None            Appearance: awake, alert, dressed in hospital scrubs, appeared as age stated, and morbidly obese  Attitude: minimally cooperative  Eye Contact: fair  Mood:  anxious and sad   Affect:  constricted mobility; labile  Speech:  soft, coherent and decreased prosody  Psychomotor Behavior:  no evidence of tardive dyskinesia, dystonia, or tics  Throught Process: linear, concrete  Associations:  no loose associations  Thought Content:  passive suicidal ideation, Self injurious behavior with urges to cut self present and endorses  auditory hallucinations of angels speaking to her, denies visual hallucinations  Insight:  limited  Judgement:  limited  Oriented to:  time, person, and place  Attention Span and Concentration:  limited  Recent and Remote Memory:  fair    Clinical Global Impressions  First:7/4 4/4/2025     Most recent:            Precautions: "     Behavioral Orders   Procedures    Code 1 - Restrict to Unit    Routine Programming     As clinically indicated    Self Injury Precaution    Status 15     Every 15 minutes.    Status Individual Observation     Patient SIO status reviewed with team/RN.  Please also refer to RN/team documentation for add'l detail.    -SIO staff to monitor following which have contributed to patient being on SIO:  Self-injury by cutting.  On [facility] unit, she stabbed her wrist with a plastic fork.  She has suicidal ideation and is unable to contract for safety.  -Possible interventions SIO staff could use to support patient's treatment progress:  Ensure no access to potentially unsafe objects.  Finger foods.  Offer coping skills.  -When following observed, team will review discontinuation of SIO:  Patient consistently maintains safe behavior and contracts for safety.     Order Specific Question:   CONTINUOUS 24 hours / day     Answer:   5 feet     Order Specific Question:   Indications for SIO     Answer:   Suicide risk     Order Specific Question:   Indications for SIO     Answer:   Self-injury risk    Suicide precautions: Suicide Risk: HIGH; Clinical rationale to override score: Exhibiting Suicidal/self-harm behaviors or thoughts     Patients on Suicide Precautions should have a Combination Diet ordered that includes a Diet selection(s) AND a Behavioral Tray selection for Safe Tray - with utensils, or Safe Tray - NO utensils       Order Specific Question:   Suicide Risk     Answer:   HIGH     Order Specific Question:   Clinical rationale to override score:     Answer:   Exhibiting Suicidal/self-harm behaviors or thoughts          DIagnoses:     Borderline Personality Disorder  Major Depressive Disorder Recurrent with Psychosis         Plan:     Started Celexa 20 mg Daily and Micatin powder 2% for rash under the breast on 4/7/2025. Zyprexa was decreased on 4/3/2025. Pt had a court for petition of recommitment on 4/9 at 2:15 pm and  hearing on 4/14. She waived her right for court hearing. Will discontinue voluntary status and will put committed.  Will as per discussion above continue on SIO and NO roommate order. Will continue to provide support and structure. Plan is to return to the [facility] at [facility].         I was present with PA student who participated in the service and in the documentation of the note. I have verified the history and personally performed the physical exam and medical decision making. I agree with the assessment and plan of care as documented in the note.     Jd Jones MD  [facility] Psychiatry    Total time spent was 38 minutes. Over 50% of times was spent counseling and coordination of care regarding coping skills, medication and discharge planning.

## 2025-04-08 NOTE — PLAN OF CARE
"Patient has spent time in between her room and the lounge. Patient continues to touch and pick on previous scratches she has on her  forearm with her finger/fingernail. States that she is anxious and depressed. Continues to hear voices that are telling her to harm herself. Told writer and other staff that she had a hidden item in her room but would not tell us what or where is was. Room was searched thoroughly again and linen changed but nothing was found. Offered prn's Took Zyprexa and vistaril. Paged on call provider for milk of mag. States that she had a small bowl movement. Adl's are poor. Ate dinner and snack. Affect is flat and guarded.     Patient evaluation continues. Assessed mood,anxiety,thoughts and behavior.     Patient gradually progressing towards goals.    Patient is encouraged to participate in groups and assisted to develop healthy coping skills.     VS reviewed: /81   Pulse 82   Temp 97.7  F (36.5  C) (Temporal)   Resp 18   Ht 1.651 m (5' 5\")   Wt (!) 144.6 kg (318 lb 11.2 oz)   SpO2 96%   BMI 53.03 kg/m      Length of stay: 4    Refer to daily team meeting notes for individualized plan of care. Nursing will continue to assess.    "

## 2025-04-08 NOTE — PLAN OF CARE
"Goal Outcome Evaluation:    IP Treatment Plan    Client's Name: Misty Parham  YOB: 1985      Treatment Plan Date: April 8, 2025      Anticipated number of sessions for this episode of care: 6    Current Concerns/Problem Areas:    Goal 1: Pt will decrease suicidal ideation by establishing more reasons to live  Pt will decrease suicidal ideation evidenced by report of fewer intrusive suicidal thoughts (decreased ...%) and 50% less intent  and Improved reality testing with psychosis demonstrated by asking staff for reality check at least 3 times per shift which is an increase from 1 times per shiftWithin group and/or individual therapy sessions, Misty will decrease self-deprecating statements from 3 a session to 1a session      Intervention(s)    Engaged in safety planning identifying coping skills, warning signs, health support and resources, Understand and identify reasons for hospitalization, how to use the hospital to create change and prevent future hospitalizations , Explored motivation for behavioral change, Provided positive reinforcement for progress towards goals, gains in knowledge, and application of skills previously taught, Discussed TIPP (body temperature, intense exercise, PMR), and Engaged in relaxation training (e.g. meditation, progressive muscle relaxation, etc.)          Pt centered considerations  Pt considerations pt has command hallucinations , satan is tricking me into thinking my angels want me to kill myself. I bottle my feelings and want to learn to talk about my feelings\"  Pt has two sons , adult that live with her sister.  She lives in a group home, worried about finances on her disability income                                   "

## 2025-04-08 NOTE — PLAN OF CARE
Team Note Due:  Thursday    Assessment/Intervention/Current Symtoms and Care Coordination:  Chart review and met with team, discussed pt progress, symptomology, and response to treatment.  Discussed the discharge plan and any potential impediments to discharge.      Team Meeting  Pt continues to scratch herself, mostly in the bathroom without the SIO watching.  It was determined that MITTS were not warranted in this case as it is too high of an intervention.    TCM called today and I gave her an update including that the pt was reporting she had a cutting tool in her room and staff could not find it.  She was alarmed at patient's behavior and said that she has not been doing this in the community.  TCM then called the pt and spoke at some length and called me back.  TCM said pt was reporting a plan to get a  at the grocery store while [facility] staff were busy with other residents who were buying things.  I informed TCM that the pt had given up a plastic straw from her sock.        Petitioning  for Recommitment:  Exam:  @ 2:15  Hearin/14       Discharge Plan or Goal:  Return to the [facility] at [facility]     Barriers to Discharge:  Too symptomatic     Referral Status:  Insurance  [facility] and Medicare      Has all established providers      Primary Care Provider:  Name/Clinic:      Number:         Medication Management/Psychiatry:  [facility]  M[facility] Clinic   [address] [place] [id number]   [phone]  Betty Cazares, WILLIAN, CNP   [address] [place] [address]   [phone] (Work)   [phone] (Fax)          Therapist:   [facility]               DBT Program  [facility]  3 days a week        /ACT Team:  Name/Clinic: Marleny Núñez  [facility]   Number: [phone]  Fax: [phone]  [email or web address].        CADI Case  Manager  [facility]  Camilo [facility]  Assisted Living     Community [facility]idential Services  Name/Clinic: [facility]  Avril Roberto is the supervisor  Number: .[phone]  Pt lives at:  [address] [place] [address]     Legal Status:  There is no [place].  [id number]   Order for Commitment As A Person Who Poses A Risk of Hrm Due to A Mental Illness  Dated: 2/15/24 to 24  PD'd on 3/1/24  Order for Continued Commitment   24 to 25    On a provisional discharge that is not revoked so is volntary  Under Commitment and [place]  Is in the process of being recommitted     County: [place]  File Number: New recommittment is   Start and expiration date of commitment:     Exam:  @ 2:15  Hearin/14    Contacts (include GEORGI status):          Upcoming Meetings and Dates/Important Information and next steps:

## 2025-04-08 NOTE — PLAN OF CARE
Goal Outcome Evaluation:    Plan of Care Reviewed With: patient          Problem: Adult Behavioral Health Plan of Care  Goal: Plan of Care Review  Outcome: Not Progressing  Flowsheets (Taken 4/8/2025 1100)  Patient Agreement with Plan of Care: agrees  Pt on SIO staff for SIB. She had reported since yesterday that she had an item she is hiding that she would use for cutting self. Pt continued to decline to disclose what the item was and where she was hiding it. She was medication compliant and she denied side effects. Pt eating and drinking adequately. Pt was on the phone with her  and she told the  on the phone about the items she is hiding due to the urge for to cut self. Said that she has voice angels telling her to kill herself so she can become an anmol. Writer talked with patient for over 20 minutes concerning the hidden object. Writer talked with pt about her goal to return to her group home. Pt reminded the need for her to remain safe before returning home. Some positive coping mechanisms, pt agreed finally and gave writer the hidden object, which was a there red straw which she had folded and put it in her sock. Pt contracted to talk to staff when she feels the urge to cut. Take a walk and talk, talk to staff, listen to headphone and use a rubber band on her wrist. Pt verbalized understanding. She paced the halls with her SIO. Will continue to monitor.

## 2025-04-08 NOTE — DISCHARGE INSTRUCTIONS
Behavioral Discharge Planning and Instructions    Summary: You were admitted on 4/3/2025  due to  urges to harm yourself .  You were treated by Jd Jones MD. You were on a one to one supervision and on physcal interventions to reduce the chances of engaging in self injurious behaviors. You continued to engage in self-injurious behaviors.   and discharged on *** from [facility] to {[facility] D/C Locations:732859}          Main Diagnosis:   Borderline Personality Disorder  Major Depressive Disorder Recurrent with Psychosis         Commitment:   You were dually committed to the [facility] and the [facility] on April 9, 2025 and you are being discharged on a Provisional Discharge Agreement which shall remain in effect for the duration of the Commitment which expires on May 1, 2026.    Health Care Follow-up:   You are insured through Medicare and [facility] plans.          Primary Care Provider:  Name/Clinic:      Number:         Medication Management/Psychiatry:  [facility]  Mental Health Clinic   [facility] [address] [address]   [phone]  Betty Cazares, WILLIAN, CNP   [address] [place] [address]   [phone] (Work)   [phone] (Fax)          Therapist:   Name/Clinic: Monse Hannah, [facility]               DBT Program  [facility]  3 days a week        /ACT Team:  Name/Clinic: Marleny Núñez  [facility]   Number: [phone]  Fax: [phone]  [email or web address].        CADI   [facility]  [facility]  Assisted Living     [facility]  Name/Clinic: PILAR Roberto is the supervisor  Number: .[phone]  Pt lives at:  [address] [phone]          Patient Navigation Hub:   [facility] Ernie lópez Navigators work to be your point-of-contact for trustworthy and  compassionate care from Inpatient services to [facility] Programmatic Care. We will provide resources and communication to help guide you into programmatic care. Ultimately, our goal is to be the one-stop-shop of communication, coordination, and support for your journey to programmatic care.    Phone: [phone]    Information will be faxed to your outpatient providers to ensure a healthy continuity of care for you.     Attend all scheduled appointments with your outpatient providers. Call at least 24 hours in advance if you need to reschedule an appointment to ensure continued access to your outpatient providers.     Major Treatments, Procedures and Findings:  You were provided with: {Major Treatments:376213}    Symptoms to Report: {symptoms:975589}    Early warning signs can include: {BH Warning Signs:535329}    Safety and Wellness:  {Age Group Safety Wellness:010314}    Resources:   Mental Health Crisis Resources  Throughout [place]: call **CRISIS (**923694)  Crisis Text Line: is available for free, 24/7 by texting [place] to 260042  Suicide Awareness Voices of Education (SAVE) (www.save.org): 124-170-KEBV (4982)  The National Suicide Prevention Lifeline is now: 988 Suicide and Crisis Lifeline. Call 988 anytime.  National [place] on Mental Illness (www.[place].davian.org): 531.916.4055 or 942-675-0475.  Vdym4ejvx: text the word LIFE to 38557 for immediate support and crisis intervention  Mental Health Consumer/Survivor Network of [place] (www.mhcsn.net): 464.465.5294 or 300-313-4471  Mental Health Association of [place] (www.mentalhealth.org): 285.838.4590 or 007-885-8478  Peer Support Connection [place] Warmline (PSC) 1-833.917.4155 Available from 5pm - 9am (7 days a week/365 days a year)  {OP Beh CRISIS PHONE NUMBERS:226935}  {Click SUDAVSRESOURCES to add resources related to addiction or EHRBLANK to skip:282423}    General Medication Instructions:   See your medication sheet(s) for instructions.   Take all medicines as  directed.  Make no changes unless your doctor suggests them.   Go to all your doctor visits.  Be sure to have all your required lab tests. This way, your medicines can be refilled on time.  Do not use any drugs not prescribed by your doctor.  Avoid alcohol.    Advance Directives:   Scanned document on file with [employer]? No scanned doc  Is document scanned? Pt states no documents  Honoring Choices Your Rights Handout: Informed and given  Was more information offered? Pt declined    The Treatment team has appreciated the opportunity to work with you. If you have any questions or concerns about your recent admission, you can contact the unit which can receive your call 24 hours a day, 7 days a week. They will be able to get in touch with a Provider if needed. The unit number is [phone] .     assessment, assessed for medical stability, medication evaluation and/or management, milieu management, and medical interventions    Symptoms to Report: mood getting worse or thoughts of suicide    Early warning signs can include: increased thoughts or behaviors of suicide or self-harm     Safety and Wellness:  Take all medicines as directed.  Make no changes unless your doctor suggests them.      Follow treatment recommendations.  Refrain from alcohol and non-prescribed drugs.  If there is a concern for safety, call 911.    Resources:   Mental Health Crisis Resources  Throughout [place]: call **CRISIS (**342405)  Crisis Text Line: is available for free, 24/7 by texting [place] to 011974  Suicide Awareness Voices of Education (SAVE) (www.save.org): 209-930-BJKW (1157)  The National Suicide Prevention Lifeline is now: 988 Suicide and Crisis Lifeline. Call 988 anytime.  National [place] on Mental Illness (www.[place].davian.org): 555.985.5839 or 047-018-6364.  Cnjv2jdoq: text the word LIFE to 60922 for immediate support and crisis intervention  Mental Health Consumer/Survivor Network of [place] (www.mhcsn.net): 904.526.3000 or 928-996-1247  Mental Health Association of [place] (www.mentalhealth.org): 459.492.9506 or 931-372-4434  Peer Support Connection [place] Warmline ([facility]) 1-111.978.6685 Available from 5pm - 9am (7 days a week/365 days a year)  Call or Text 988 or [facility] 1-488.434.2800 Community Outreach for Psych Emergencies      General Medication Instructions:   See your medication sheet(s) for instructions.   Take all medicines as directed.  Make no changes unless your doctor suggests them.   Go to all your doctor visits.  Be sure to have all your required lab tests. This way, your medicines can be refilled on time.  Do not use any drugs not prescribed by your doctor.  Avoid alcohol.    Advance Directives:   Scanned document on file with [employer]? No scanned doc  Is document scanned? Pt states no documents  Honoring Choices  Your Rights Handout: Informed and given  Was more information offered? Pt declined    The Treatment team has appreciated the opportunity to work with you. If you have any questions or concerns about your recent admission, you can contact the unit which can receive your call 24 hours a day, 7 days a week. They will be able to get in touch with a Provider if needed. The unit number is [phone] .

## 2025-04-08 NOTE — PLAN OF CARE
Problem: Sleep Disturbance  Goal: Adequate Sleep/Rest  Outcome: Progressing   Goal Outcome Evaluation:    NOC Shift Report    Pt was in bed at the beginning of the shift. Breathing was regular, spontaneous, and unlabored. Pt did not present with any medical concerns this shift. There were no  PRNs given. SIO for suicidal risk. The plan of care is ongoing.       Pt appears to sleep for 6.25  hours

## 2025-04-08 NOTE — PLAN OF CARE
"Goal Outcome Evaluation:    Individual Therapy Note      Date of Service: April 8, 2025    Patient: Misty goes by \"Misty,\" uses she/her pronouns    Individuals Present: Misty Sanju Zeb SARAH Segovia    Session start: 2:10 pm  Session end: 2:40 pm  Session duration in minutes: 30      Modality Used: Person Centered and Rapport Building    Goals: Safety plan update, rapport building , goal setting    Patient Description of current symptoms: angels tell her to harm and kill self, trouble not bottling feelings, worry about sons     Mental Status Exam:   Attitude: cooperative  Eye Contact: fair  Mood: sad   Affect: appropriate and in normal range  Speech: clear, coherent and paucity of speech  Psychomotor Behavior: no evidence of tardive dyskinesia, dystonia, or tics  Thought Process:  logical, linear, evidence of thought blocking present, and illogical  Associations: loosening of associations present  Thought Content: active suicidal ideation present with command hallucinations  Insight: fair  Judgement: fair  Attention Span and Concentration: fair    Pt progress: Pt is more insightful than writer believed to be true based on meeting first day she arrived, she was confused and non conversational, today she was conversational and able to identify therapy goals    Treatment Objective(s) Addressed:   The focus of this session was on rapport building, orienting the patient to therapy, safety planning, building distress tolerance, and building self-esteem     Progress Towards Goals and Assessment of Patient:   Patient is making progress towards treatment goals as evidenced by willingness to engage.       Therapeutic Intervention(s):   Provided active listening, unconditional positive regard, and validation.   Engaged in safety planning identifying coping skills, warning signs, health support and resources, Understand and identify reasons for hospitalization, how to use the hospital to create change and prevent future " hospitalizations , Explored motivation for behavioral change, Engaged in cognitive restructuring/ reframing, looked at common cognitive distortions and challenged negative thoughts, Provided positive reinforcement for progress towards goals, gains in knowledge, and application of skills previously taught, Identified stress relief practices, Explored strategies for self-soothing, Reviewed healthy living that supports positive mental health, including looking at sleep hygiene, regular movement, nutrition, and regular socialization, and Introduced and practiced Wise Mind Introduced and practiced urge surfing      Safety Plan: completed with patient    Plan/next step: meet again     44240 - Psychotherapy (with patient) - 30 (16-37*) min    Patient Active Problem List   Diagnosis    Depression    Suicidal ideation    Depression with suicidal ideation    Major depression    Suicidal behavior    Facial cellulitis    Auditory hallucination    Psychosis, unspecified psychosis type (H)    Morbid obesity (H)    Chronic insomnia    Generalized anxiety disorder    COVID-19    Borderline personality disorder (H)    Depression, unspecified depression type

## 2025-04-08 NOTE — PLAN OF CARE
0BEH IP Unit Acuity Rating Score (UARS)  Patient is given one point for every criteria they meet.    CRITERIA SCORING   On a 72 hour hold, court hold, committed, stay of commitment, or revocation. 1    Patient LOS on BEH unit exceeds 20 days. 0  LOS: 5   Patient under guardianship, 55+, otherwise medically complex, or under age 11. 0   Suicide ideation without relief of precipitating factors. 0   Current plan for suicide. 0   Current plan for homicide. 0   Imminent risk or actual attempt to seriously harm another without relief of factors precipitating the attempt. 0   Severe dysfunction in daily living (ex: complete neglect for self care, extreme disruption in vegetative function, extreme deterioration in social interactions). 1   Recent (last 7 days) or current physical aggression in the ED or on unit. 0   Restraints or seclusion episode in past 72 hours. 0   Recent (last 7 days) or current verbal aggression, agitation, yelling, etc., while in the ED or unit. 0   Active psychosis. 0   Need for constant or near constant redirection (from leaving, from others, etc).  0   Intrusive or disruptive behaviors. 0   Patient requires 3 or more hours of individualized nursing care per 8-hour shift (i.e. for ADLs, meds, therapeutic interventions). 1   TOTAL 3

## 2025-04-09 PROCEDURE — 250N000013 HC RX MED GY IP 250 OP 250 PS 637: Performed by: PSYCHIATRY & NEUROLOGY

## 2025-04-09 PROCEDURE — 250N000013 HC RX MED GY IP 250 OP 250 PS 637: Performed by: REGISTERED NURSE

## 2025-04-09 PROCEDURE — 250N000013 HC RX MED GY IP 250 OP 250 PS 637: Performed by: NURSE PRACTITIONER

## 2025-04-09 PROCEDURE — 99233 SBSQ HOSP IP/OBS HIGH 50: CPT | Performed by: PSYCHIATRY & NEUROLOGY

## 2025-04-09 PROCEDURE — 97150 GROUP THERAPEUTIC PROCEDURES: CPT | Mod: GO

## 2025-04-09 PROCEDURE — 124N000002 HC R&B MH UMMC

## 2025-04-09 RX ADMIN — TRAZODONE HYDROCHLORIDE 50 MG: 50 TABLET ORAL at 01:27

## 2025-04-09 RX ADMIN — MICONAZOLE NITRATE: 20 POWDER TOPICAL at 08:31

## 2025-04-09 RX ADMIN — METFORMIN HYDROCHLORIDE 1000 MG: 500 TABLET, FILM COATED ORAL at 17:57

## 2025-04-09 RX ADMIN — HYDROXYZINE HYDROCHLORIDE 50 MG: 50 TABLET, FILM COATED ORAL at 01:27

## 2025-04-09 RX ADMIN — LAMOTRIGINE 150 MG: 100 TABLET ORAL at 20:45

## 2025-04-09 RX ADMIN — CLONAZEPAM 1 MG: 0.5 TABLET ORAL at 20:46

## 2025-04-09 RX ADMIN — OLANZAPINE 15 MG: 15 TABLET, FILM COATED ORAL at 19:23

## 2025-04-09 RX ADMIN — LAMOTRIGINE 150 MG: 100 TABLET ORAL at 08:30

## 2025-04-09 RX ADMIN — DOCUSATE SODIUM 100 MG: 100 CAPSULE, LIQUID FILLED ORAL at 08:30

## 2025-04-09 RX ADMIN — Medication 12.5 MCG: at 08:30

## 2025-04-09 RX ADMIN — CITALOPRAM HYDROBROMIDE 20 MG: 10 TABLET ORAL at 08:29

## 2025-04-09 RX ADMIN — CLONAZEPAM 1 MG: 0.5 TABLET ORAL at 13:46

## 2025-04-09 RX ADMIN — MAGNESIUM HYDROXIDE 30 ML: 400 SUSPENSION ORAL at 17:57

## 2025-04-09 RX ADMIN — CLONAZEPAM 1 MG: 0.5 TABLET ORAL at 08:29

## 2025-04-09 RX ADMIN — ALUMINUM HYDROXIDE, MAGNESIUM HYDROXIDE, AND SIMETHICONE 30 ML: 200; 200; 20 SUSPENSION ORAL at 20:45

## 2025-04-09 RX ADMIN — METFORMIN HYDROCHLORIDE 1000 MG: 500 TABLET, FILM COATED ORAL at 08:30

## 2025-04-09 RX ADMIN — PRAZOSIN HYDROCHLORIDE 2 MG: 2 CAPSULE ORAL at 20:45

## 2025-04-09 RX ADMIN — OLANZAPINE 10 MG: 10 TABLET, FILM COATED ORAL at 12:19

## 2025-04-09 RX ADMIN — DOCUSATE SODIUM 100 MG: 100 CAPSULE, LIQUID FILLED ORAL at 20:46

## 2025-04-09 RX ADMIN — ARIPIPRAZOLE 5 MG: 5 TABLET ORAL at 08:30

## 2025-04-09 RX ADMIN — SENNOSIDES AND DOCUSATE SODIUM 1 TABLET: 50; 8.6 TABLET ORAL at 08:30

## 2025-04-09 RX ADMIN — TRAZODONE HYDROCHLORIDE 300 MG: 150 TABLET ORAL at 20:46

## 2025-04-09 RX ADMIN — POLYETHYLENE GLYCOL 3350 17 G: 17 POWDER, FOR SOLUTION ORAL at 08:29

## 2025-04-09 ASSESSMENT — ACTIVITIES OF DAILY LIVING (ADL)
ORAL_HYGIENE: INDEPENDENT
HYGIENE/GROOMING: INDEPENDENT
ADLS_ACUITY_SCORE: 46
ADLS_ACUITY_SCORE: 46
ORAL_HYGIENE: INDEPENDENT
ADLS_ACUITY_SCORE: 46
DRESS: INDEPENDENT
ADLS_ACUITY_SCORE: 46
HYGIENE/GROOMING: INDEPENDENT
ADLS_ACUITY_SCORE: 46
DRESS: INDEPENDENT
LAUNDRY: WITH SUPERVISION
ADLS_ACUITY_SCORE: 46
ADLS_ACUITY_SCORE: 46

## 2025-04-09 NOTE — PLAN OF CARE
Goal Outcome Evaluation:     was asked by practitioner to work with this pt on self harm urges.  approached today approx 2:00 pm, she was sleeping soundly in her room,  will try again tomorrow

## 2025-04-09 NOTE — PLAN OF CARE
Goal Outcome Evaluation:    Problem: Sleep Disturbance  Goal: Adequate Sleep/Rest  Outcome: Progressing     Patient appeared to sleep for 6 hours. She continues on SIO due to self injurious behaviors. She reported anxiety and trouble sleeping; vistaril 50mg and trazodone 50mg were administered. No concerns voiced. We'll continue to monitor.

## 2025-04-09 NOTE — PLAN OF CARE
"Pt not attending and participating in unit groups/activities.  Pt appropriate and social with staff and peers while in the milieu but isolative and withdrawn majority of the shift in her room listening to headphones only comes out for meals and phone calls. Pt endorses SI thoughts but contracts for safety as long as in hospital. Pt endorses AH and PRN was offered.Seroquel was increased to 400 mg from 300 mg Pt states \" my sleep and appetite are so so\". Pt was medication compliant this shift. Pt endorses anxiety and depression 6/10. Discharge plan TBD.      Problem: Suicidal Behavior  Goal: Suicidal Behavior is Absent or Managed  Outcome: No Change     Problem: Psychomotor Impairment (Psychotic Signs/Symptoms)  Goal: Improved Psychomotor Symptoms (Psychotic Signs/Symptoms)  Outcome: No Change     " Statement Selected

## 2025-04-09 NOTE — PLAN OF CARE
Team Note Due:  Thursday    Assessment/Intervention/Current Symtoms and Care Coordination:  Chart review and met with team, discussed pt progress, symptomology, and response to treatment.  Discussed the discharge plan and any potential impediments to discharge.      Team Meeting  CTC reported that the pt signed a waiver to be committed and all court hearings will be cancelled.         Discharge Plan or Goal:  Return to the [facility] at [facility].      Barriers to Discharge:  Too symptomatic     Referral Status:  Insurance  [facility] and Medicare      Has all established providers      Primary Care Provider:  Name/Clinic:      Number:         Medication Management/Psychiatry:  [facility]  Mental Health Clinic   [address] [address]   [phone]  Betty Cazares, WILLIAN, CNP   [address] [place] [address]   [phone] (Work)   [phone] (Fax)          Therapist:   Name/Clinic: Monse Hannah, LICSW               DBT Program  [facility]  3 days a week        /ACT Team:  Name/Clinic: Marleny Núñez  [facility]   Number: [phone]  Fax: [phone]  [email or web address].        CADI   [facility]  [facility]  Assisted Living     [facility] Services  Name/Clinic: [facility]  Avril Rodríguezisha is the supervisor  Number: .[phone]  Pt lives at:  [address] [place] [address]     Legal Status:  There is no [place].  47-SO-MR-   Order for Commitment As A Person Who Poses A Risk of Hrm Due to A Mental Illness  Dated: 2/15/24 to 8/14/24  PD'd on 3/1/24  Order for Continued Commitment   8/1/24 to 5/1/25    On a provisional discharge that is not revoked so is volntary  Under Commitment and [place]  Is in the process of being recommitted - has signed a waiver - waiting for the new court documents     County:  [place]  File Number: New recommittment is   Start and expiration date of commitment:       Contacts (include GEORGI status):          Upcoming Meetings and Dates/Important Information and next steps:

## 2025-04-09 NOTE — PLAN OF CARE
"Patient has spent time in between her room and the lounge. Keeps to herself.  Affect and been flat and appears distracted. Continue to have voices telling her to harm herself. Did some like scratching on her forearm. Did not break her skin. Reports having anxiety and depression. Med compliant. On call provider pages and milk of mag ordered as a daily prn. Patient stated she had a bowl movement today. Continues to be on 24 hour SIO for SIB. Cooperative on the unit.       Patient evaluation continues. Assessed mood,anxiety,thoughts and behavior.     Patient gradually progressing towards goals.    Patient is encouraged to participate in groups and assisted to develop healthy coping skills.     VS reviewed: /89   Pulse 87   Temp 97.8  F (36.6  C) (Oral)   Resp 16   Ht 1.651 m (5' 5\")   Wt (!) 144.6 kg (318 lb 11.2 oz)   SpO2 94%   BMI 53.03 kg/m      Length of stay: 5    Refer to daily team meeting notes for individualized plan of care. Nursing will continue to assess.    "

## 2025-04-09 NOTE — PLAN OF CARE
"  Rehab Group    Start time: 11:25  End time: 12:03  Patient time total: 38 minutes    attended full group    #7 attended   Group Type: occupational therapy   Group Topic Covered: self-esteem and social skills, self-expression, values exploration      Group Session Detail:  Patient engaged in an interactive group focused on self identity. The therapeutic benefits of this Occupational Therapy group include promoting values exploration, identification of personal traits, and self-expression. Participants in this group were engaged in a group discussion pertaining to personal identity and values. After, participants were instructed to complete a personal identity crest and share final products with peers.      Patient Response/Contribution:  cooperative with task, organized, attentive, actively engaged, and withdrawn, safe use of materials      Patient Detail:  Patient Response: Patient arrived to the group on time. During the initial check-in patient shared that she feels the weather can impact her mood as she enjoys spending time outdoors. When asked to identify two words that describe her personality, patient stated \"shy and polite.\" Patient engaged in group discussions when prompted and listened appropriately to peer sharing. When sharing her personal crest, patient discussed including the word \"mom\" as she greatly values being a mother to her three children. Patient also noted including \"fish and stars\" and described having a goal to see the ocean one day and enjoys stargazing. Affect appeared flat and somewhat somber. However, patient remained calm and cooperative throughout the duration of this group.        [address] OT Group (2 or more in attendance)      Patient Active Problem List   Diagnosis    Depression    Suicidal ideation    Depression with suicidal ideation    Major depression    Suicidal behavior    Facial cellulitis    Auditory hallucination    Psychosis, unspecified psychosis type (H)    Morbid obesity " (H)    Chronic insomnia    Generalized anxiety disorder    COVID-19    Borderline personality disorder (H)    Depression, unspecified depression type

## 2025-04-09 NOTE — PLAN OF CARE
0BEH IP Unit Acuity Rating Score (UARS)  Patient is given one point for every criteria they meet.    CRITERIA SCORING   On a 72 hour hold, court hold, committed, stay of commitment, or revocation. 1    Patient LOS on BEH unit exceeds 20 days. 0  LOS: 6   Patient under guardianship, 55+, otherwise medically complex, or under age 11. 0   Suicide ideation without relief of precipitating factors. 0   Current plan for suicide. 0   Current plan for homicide. 0   Imminent risk or actual attempt to seriously harm another without relief of factors precipitating the attempt. 0   Severe dysfunction in daily living (ex: complete neglect for self care, extreme disruption in vegetative function, extreme deterioration in social interactions). 1   Recent (last 7 days) or current physical aggression in the ED or on unit. 0   Restraints or seclusion episode in past 72 hours. 0   Recent (last 7 days) or current verbal aggression, agitation, yelling, etc., while in the ED or unit. 0   Active psychosis. 0   Need for constant or near constant redirection (from leaving, from others, etc).  0   Intrusive or disruptive behaviors. 0   Patient requires 3 or more hours of individualized nursing care per 8-hour shift (i.e. for ADLs, meds, therapeutic interventions). 1   TOTAL 3

## 2025-04-09 NOTE — PLAN OF CARE
"  Problem: Suicidal Behavior  Goal: Suicidal Behavior is Absent or Managed  Outcome: Progressing   Goal Outcome Evaluation:               Pt. SIO for self - injury    Pt. Presents with a flat and depressed affect, minimal engagement with peers, staff encouraging unit activities pt.  attended and participated in group making art projects, pt. Reports groups helping. Pt requested PRN Zyprexa due to having strong SIB urges to cut, helpful NO SIB observed by staff. Pt. Pacing the hallway listening to music and playing with fidget toys. Visible in the lounge for meals eat 100% of breakfast and lunch.    Pt. Endorses auditory hallucinations, less active today, denies SI/HI/SIB and hallucinations. Medication compliance and contracted for safety.    Miconazole powder rubbed under bilateral reddened breast, pt brightened and laughed when powder sprayed        Blood pressure (!) 141/89, pulse 81, temperature 97.5  F (36.4  C), temperature source Temporal, resp. rate 16, height 1.651 m (5' 5\"), weight (!) 144.6 kg (318 lb 11.2 oz), SpO2 94%, not currently breastfeeding.           "

## 2025-04-09 NOTE — PROGRESS NOTES
"[facility], [place]   Psychiatric Progress Note        Interim History:     The patient's care was discussed with the treatment team during the daily team meeting and/or staff's chart notes were reviewed.  Staff report patient slept for about 6 hours. She was continued on SIO and no roommate order due to ongoing Self injurious behavior, though, no self harmful behavior was reported over last 24 hours. Misty spent some time resting in her room and was also seen walking with her SIO staff in this unit corridor. Was cooperative with meds and cares. Attended groups, see OT's note below:    \"Patient Detail:  Patient Response: Patient arrived to the group on time. During the initial check-in patient shared that she feels the weather can impact her mood as she enjoys spending time outdoors. When asked to identify two words that describe her personality, patient stated \"shy and polite.\" Patient engaged in group discussions when prompted and listened appropriately to peer sharing. When sharing her personal crest, patient discussed including the word \"mom\" as she greatly values being a mother to her three children. Patient also noted including \"fish and stars\" and described having a goal to see the ocean one day and enjoys stargazing. Affect appeared flat and somewhat somber. However, patient remained calm and cooperative throughout the duration of this group.\"     Met with patient with student in the conference room. She walked in willingly and talked to us. During that time she maintained partial eye contact, appeared to be somewhat anxious and tense. Admitted to still having urges to harm self and, when we wondered how she managed not to harm self at her group home per her CM, Misty simply said that she had ongoing Self injurious behavior and her CM simply didn't know about it. Misty denied presence of active Suicidal ideation. Asked us about day of discharge. We advised her that we would need " to see more days without Self injurious behavior and have her off SIO prior to discharge back to community. Encouraged Misty to continue to take small steps such as attempting not to engage in Self injurious behavior for limited time period and extend those times for longer and longer. Misty was in agreement and said that she had no more questions or concerns.          Medications:     Current Facility-Administered Medications   Medication Dose Route Frequency Provider Last Rate Last Admin    ARIPiprazole (ABILIFY) tablet 5 mg  5 mg Oral Daily Jd Jones MD   5 mg at 04/09/25 0830    citalopram (celeXA) tablet 20 mg  20 mg Oral Daily Jd Jones MD   20 mg at 04/09/25 0829    clonazePAM (klonoPIN) tablet 1 mg  1 mg Oral TID Nissa Morris APRN CNP   1 mg at 04/09/25 1346    docusate sodium (COLACE) capsule 100 mg  100 mg Oral BID Nissa Morris APRN CNP   100 mg at 04/09/25 0830    lamoTRIgine (LaMICtal) tablet 150 mg  150 mg Oral BID Nissa Morris APRN CNP   150 mg at 04/09/25 0830    levothyroxine (SYNTHROID/LEVOTHROID) half-tab 12.5 mcg  12.5 mcg Oral QAM AC Nissa Morris APRN CNP   12.5 mcg at 04/09/25 0830    metFORMIN (GLUCOPHAGE) tablet 1,000 mg  1,000 mg Oral BID w/meals Nissa Morris APRN CNP   1,000 mg at 04/09/25 0830    miconazole (MICATIN) 2 % powder   Topical BID Jd Jones MD   Given at 04/09/25 0831    OLANZapine (zyPREXA) tablet 15 mg  15 mg Oral At Bedtime Nissa Morris APRN CNP   15 mg at 04/08/25 2040    prazosin (MINIPRESS) capsule 2 mg  2 mg Oral At Bedtime Nissa Morris APRN CNP   2 mg at 04/08/25 2040    traZODone (DESYREL) tablet 300 mg  300 mg Oral At Bedtime Nissa Morris APRN CNP   300 mg at 04/08/25 2040          Allergies:   No Known Allergies       Labs:   No results found for this or any previous visit (from the past 24 hours).       Psychiatric Examination:     BP (!)  "141/89   Pulse 81   Temp 97.5  F (36.4  C) (Temporal)   Resp 16   Ht 1.651 m (5' 5\")   Wt (!) 144.6 kg (318 lb 11.2 oz)   SpO2 94%   BMI 53.03 kg/m    Weight is 318 lbs 11.2 oz  Body mass index is 53.03 kg/m .    Orthostatic Vitals         Most Recent      Sitting Orthostatic /88 04/08 1700    Standing Orthostatic /88 04/09 0819    Standing Orthostatic Pulse (bpm) 100 04/09 0819           Appearance: awake, alert, dressed in hospital scrubs, appeared as age stated, and morbidly obese  Attitude: somewhat more cooperative  Eye Contact: partial  Mood:  anxious and sad   Affect:  constricted mobility;    Speech:  soft, coherent and decreased prosody  Psychomotor Behavior:  no evidence of tardive dyskinesia, dystonia, or tics  Throught Process: linear, concrete  Associations:  no loose associations  Thought Content:  passive suicidal ideation, Self injurious behavior with urges to cut self present and endorses  auditory hallucinations of angels speaking to her, denies visual hallucinations  Insight:  limited  Judgement:  limited  Oriented to:  time, person, and place  Attention Span and Concentration:  limited  Recent and Remote Memory:  fair    Clinical Global Impressions  First:7/4 4/4/2025     Most recent:            Precautions:     Behavioral Orders   Procedures    Code 1 - Restrict to Unit    Routine Programming     As clinically indicated    Self Injury Precaution    Status 15     Every 15 minutes.    Status Individual Observation     Patient SIO status reviewed with team/RN.  Please also refer to RN/team documentation for add'l detail.    -SIO staff to monitor following which have contributed to patient being on SIO:  *Self-injury by cutting.  On [facility] unit, she stabbed her wrist with a plastic fork.  She has suicidal ideation and is unable to contract for safety.  *Pt also had coffee stir stick/straw hidden in sock for a couple days, pt gave to RN on 2/8. Please watch to ensure pt is not " grabbing stir sticks (or other utensils).  -Possible interventions SIO staff could use to support patient's treatment progress:  *Ensure no access to potentially unsafe objects, including straws/stir sticks, utensils, other items that she may find on unit or in group.  Finger foods.   *Pt should not have hygiene items in her room. These should be stored in her locker and pt may use them when needed with SIO staff present. Afterward, they should be placed back in locker.   *Please do not give pt any additional items without checking with RN first.    *Offer coping skills.  -When following observed, team will review discontinuation of SIO:  Patient consistently maintains safe behavior and contracts for safety.     Order Specific Question:   CONTINUOUS 24 hours / day     Answer:   5 feet     Order Specific Question:   Indications for SIO     Answer:   Suicide risk     Order Specific Question:   Indications for SIO     Answer:   Self-injury risk    Suicide precautions: Suicide Risk: HIGH; Clinical rationale to override score: Exhibiting Suicidal/self-harm behaviors or thoughts     Patients on Suicide Precautions should have a Combination Diet ordered that includes a Diet selection(s) AND a Behavioral Tray selection for Safe Tray - with utensils, or Safe Tray - NO utensils       Order Specific Question:   Suicide Risk     Answer:   HIGH     Order Specific Question:   Clinical rationale to override score:     Answer:   Exhibiting Suicidal/self-harm behaviors or thoughts          DIagnoses:     Borderline Personality Disorder  Major Depressive Disorder Recurrent with Psychosis         Plan:     Started Celexa 20 mg Daily and Micatin powder 2% for rash under the breast on 4/7/2025. Zyprexa was decreased on 4/3/2025. Pt had a court for petition of recommitment on 4/9 at 2:15 pm and hearing on 4/14. She waived her right for court hearing. Will discontinue voluntary status and will put committed.  Will as per discussion above  continue on SIO and NO roommate order. No medication changes today 4/9/2025. Will continue to provide support and structure. Plan [facility].         I was present with PA student who participated in the service and in the documentation of the note. I have verified the history and personally performed the physical exam and medical decision making. I agree with the assessment and plan of care as documented in the note.     Jd Jones MD  [facility]    Total time spent was 36 minutes. Over 50% of times was spent counseling and coordination of care regarding coping skills, medication and discharge planning.

## 2025-04-10 PROCEDURE — 97150 GROUP THERAPEUTIC PROCEDURES: CPT | Mod: GO

## 2025-04-10 PROCEDURE — 250N000013 HC RX MED GY IP 250 OP 250 PS 637: Performed by: PSYCHIATRY & NEUROLOGY

## 2025-04-10 PROCEDURE — 90832 PSYTX W PT 30 MINUTES: CPT | Performed by: COUNSELOR

## 2025-04-10 PROCEDURE — 99233 SBSQ HOSP IP/OBS HIGH 50: CPT | Performed by: PSYCHIATRY & NEUROLOGY

## 2025-04-10 PROCEDURE — 250N000013 HC RX MED GY IP 250 OP 250 PS 637: Performed by: NURSE PRACTITIONER

## 2025-04-10 PROCEDURE — 124N000002 HC R&B MH UMMC

## 2025-04-10 RX ADMIN — CLONAZEPAM 1 MG: 0.5 TABLET ORAL at 08:28

## 2025-04-10 RX ADMIN — METFORMIN HYDROCHLORIDE 1000 MG: 500 TABLET, FILM COATED ORAL at 17:47

## 2025-04-10 RX ADMIN — PRAZOSIN HYDROCHLORIDE 2 MG: 2 CAPSULE ORAL at 20:18

## 2025-04-10 RX ADMIN — Medication 12.5 MCG: at 08:29

## 2025-04-10 RX ADMIN — LAMOTRIGINE 150 MG: 100 TABLET ORAL at 08:28

## 2025-04-10 RX ADMIN — METFORMIN HYDROCHLORIDE 1000 MG: 500 TABLET, FILM COATED ORAL at 08:28

## 2025-04-10 RX ADMIN — ARIPIPRAZOLE 5 MG: 5 TABLET ORAL at 08:28

## 2025-04-10 RX ADMIN — CITALOPRAM HYDROBROMIDE 20 MG: 10 TABLET ORAL at 08:28

## 2025-04-10 RX ADMIN — TRAZODONE HYDROCHLORIDE 300 MG: 150 TABLET ORAL at 20:17

## 2025-04-10 RX ADMIN — LAMOTRIGINE 150 MG: 100 TABLET ORAL at 20:17

## 2025-04-10 RX ADMIN — CLONAZEPAM 1 MG: 0.5 TABLET ORAL at 20:18

## 2025-04-10 RX ADMIN — MICONAZOLE NITRATE: 20 POWDER TOPICAL at 09:07

## 2025-04-10 RX ADMIN — POLYETHYLENE GLYCOL 3350 17 G: 17 POWDER, FOR SOLUTION ORAL at 09:06

## 2025-04-10 RX ADMIN — DOCUSATE SODIUM 100 MG: 100 CAPSULE, LIQUID FILLED ORAL at 08:28

## 2025-04-10 RX ADMIN — OLANZAPINE 10 MG: 10 TABLET, FILM COATED ORAL at 14:29

## 2025-04-10 RX ADMIN — DOCUSATE SODIUM 100 MG: 100 CAPSULE, LIQUID FILLED ORAL at 20:18

## 2025-04-10 RX ADMIN — OLANZAPINE 15 MG: 15 TABLET, FILM COATED ORAL at 20:17

## 2025-04-10 RX ADMIN — CLONAZEPAM 1 MG: 0.5 TABLET ORAL at 13:01

## 2025-04-10 ASSESSMENT — ACTIVITIES OF DAILY LIVING (ADL)
ADLS_ACUITY_SCORE: 35
ADLS_ACUITY_SCORE: 35
ADLS_ACUITY_SCORE: 46
ORAL_HYGIENE: INDEPENDENT;WITH SUPERVISION
ADLS_ACUITY_SCORE: 35
ADLS_ACUITY_SCORE: 66
ADLS_ACUITY_SCORE: 46
ADLS_ACUITY_SCORE: 66
ADLS_ACUITY_SCORE: 46
ADLS_ACUITY_SCORE: 35
DRESS: SCRUBS (BEHAVIORAL HEALTH);INDEPENDENT
ADLS_ACUITY_SCORE: 46
ADLS_ACUITY_SCORE: 46
ADLS_ACUITY_SCORE: 66
ADLS_ACUITY_SCORE: 66
HYGIENE/GROOMING: INDEPENDENT
HYGIENE/GROOMING: INDEPENDENT;WITH SUPERVISION
ORAL_HYGIENE: INDEPENDENT
ADLS_ACUITY_SCORE: 46
ADLS_ACUITY_SCORE: 46
ADLS_ACUITY_SCORE: 66
LAUNDRY: WITH SUPERVISION
ADLS_ACUITY_SCORE: 35
ADLS_ACUITY_SCORE: 46
DRESS: SCRUBS (BEHAVIORAL HEALTH);INDEPENDENT;WITH SUPERVISION
ADLS_ACUITY_SCORE: 46
ADLS_ACUITY_SCORE: 66
ADLS_ACUITY_SCORE: 46

## 2025-04-10 NOTE — PLAN OF CARE
Problem: Suicidal Behavior  Goal: Suicidal Behavior is Absent or Managed  Outcome: Progressing   Goal Outcome Evaluation:    Plan of Care Reviewed With: patient      Presents as polite and cooperative during evening shift. States she is having a good day. Voices urges to scratch, but agrees to safety and says she will inform staff of worsening symptoms. Approximately 1930 reports increasing anxiety and agitation after several codes and disruptive, assaulting behaviors by another pt towards staff and other patients. Administered HS Zyprexa at this time. She does say it was helpful when asked. Remains on SIO for safety. Encouraged to voice needs, questions, and concerns. Pt verbalizes understanding.

## 2025-04-10 NOTE — PLAN OF CARE
"Goal Outcome Evaluation:    Individual Therapy Note      Date of Service: April 10, 2025    Patient: Misty goes by \"Misty,\" uses she/her pronouns    Individuals Present: Misty & [facility]    Session start: 2:30 pm  Session end: 3:00 pm  Session duration in minutes: 30      Modality Used: DBT, Person Centered, and Solution Focused    Goals: as per provider talk to her about self harm urges, voices she says are satan tricking her angels, tell her to die and hurt herself. Insight about self harm urges  When she feels under stress, mostly financial stress. She says her sons . Writer and she reviewed TIPP skills and also talked about healthy boundaries with sons. When she was talking she appeared stronger, voice got more volume etc, about her right to set boundaries    Patient Description of current symptoms: less voices, but still urges, says \" when I am under stress\"     Mental Status Exam:   Attitude: cooperative  Eye Contact: fair  Mood: sad  and depressed  Affect: constricted mobility  Speech:  soft voice, but volume increases with discussing empowering to set boundaries  Psychomotor Behavior: no evidence of tardive dyskinesia, dystonia, or tics  Thought Process:  logical, linear, and illogical  Associations: loosening of associations present  Thought Content: active suicidal ideation present and thoughts of self-harm, which are remained the same  Insight: limited  Judgement: limited  Attention Span and Concentration: fair    Pt progress: Pt was open to learning new skills to address SI and SIB, writer provided worksheets and encouraged her to try skills over the weekend.    Treatment Objective(s) Addressed:   The focus of this session was on rapport building, orienting the patient to therapy, building distress tolerance, building self-esteem, and building skills in emotional regulation/ distress tolerance/ healthy communication      Progress Towards Goals and Assessment of Patient:   Patient is making " progress towards treatment goals as evidenced by willingness to try new skills.       Therapeutic Intervention(s):   Provided active listening, unconditional positive regard, and validation.   Explored motivation for behavioral change, Engaged in cognitive restructuring/ reframing, looked at common cognitive distortions and challenged negative thoughts, Provided positive reinforcement for progress towards goals, gains in knowledge, and application of skills previously taught, Discussed TIPP (body temperature, intense exercise, PMR), Identified stress relief practices, Explored strategies for self-soothing, Introduced and explored accumulating positives, and Discussed and practiced mindfulness      Safety Plan: completed with patient    Plan/next step: check in early next week again and see if urges have lessened    25437 - Psychotherapy (with patient) - 30 (16-37*) min    Patient Active Problem List   Diagnosis    Depression    Suicidal ideation    Depression with suicidal ideation    Major depression    Suicidal behavior    Facial cellulitis    Auditory hallucination    Psychosis, unspecified psychosis type (H)    Morbid obesity (H)    Chronic insomnia    Generalized anxiety disorder    COVID-19    Borderline personality disorder (H)    Depression, unspecified depression type

## 2025-04-10 NOTE — PLAN OF CARE
"Goal Outcome Evaluation:    Plan of Care Reviewed With: patient          Pt and RN met in the pt's room alongside SIO staff. Pt presents with a flat and blunted affect. Mood is anxious and depressed. Pt stated that the Zyprexa she received in the afternoon from morning shift assisted in lowering her SIB urges. Pt stated she still has urges, but contracts for safety in the hospital. Pt endorses auditory hallucinations stating that she hears \"angels voices, but I think it actually the devil acting like angels because he knows I like angels\". Pt stated that the anmol tell her to end her life, but pt states she knows that will be selfish of her because she will be leaving her 3 kids behind. Pt states if she was to harm herself it would be via overdosing on her medications at home. Pt denies HI. Pt denies visual hallucinations or delusions. Pt is medication compliant. Appetite and fluid intake adequate. VSS. Pt denies acute physical pain. No medication side effects reported or observed this shift. Hygiene is adequate. Pt endorses chronic constipation and states she utilizes miralax in the morning and milk of magnesia in the evening which helps. Pt's last bowel movement was this morning and it was small and hard. Plan is to continue to monitor patients status q 15 mins, Continue SIO 5ft for SIB and SI, assess response to medication, and maintain the patients safety.       PRNs Given during shift: NONE    Group Attendance: Did not attend group  Pain: 0/10      /90   Pulse 84   Temp 98  F (36.7  C) (Oral)   Resp 16   Ht 1.651 m (5' 5\")   Wt (!) 147.5 kg (325 lb 1.6 oz)   SpO2 96%   BMI 54.10 kg/m     "

## 2025-04-10 NOTE — PLAN OF CARE
Problem: Suicide Risk  Goal: Absence of Self-Harm  Outcome: Progressing   Goal Outcome Evaluation:             Pt continues on SIO 1:1 for self injury risk. Slept 6.75 hrs this shift. Safety Rounds completed every 15 minutes throughout the night. Visible respirations noted with no signes of distress. No safety or behavior concerns noted this shift. Will continue to monitor and provide support as needed.

## 2025-04-10 NOTE — PLAN OF CARE
04/10/25 1404   Individualization/Patient Specific Goals   Patient Personal Strengths expressive of needs;no history of violence   Patient Vulnerabilities history of unsuccessful treatment   Anxieties, Fears or Concerns pt is now motivated to return home   Individualized Care Needs SIO for self injurious behaviors   Patient/Family-Specific Goals (Include Timeframe) Stabilze and return to her [facility].   Interprofessional Rounds   Summary Pt was referrd by DBT therapist as pt was reporting strong urges for suicldal ideation and self injurious behavors with auditry hallucinations she refers to as angels.   Participants CTC;nursing;OT;other (see comments)   Behavioral Team Discussion   Participants Jd Jones MD, Marietta WHITMORE,Kash Mckeon RN, Jennifer Luna OTR, Mariann HANSON NP student   Progress Pt has more cotrol over SIB and is motivated to return to [facility].   Anticipated length of stay 4 days   Continued Stay Criteria/Rationale The pt needs a few nore days of stabilization before returning home   Medical/Physical no issues discussed   Precautions SI, SIB   Plan Discharge back to [facility] home   Rationale for change in precautions or plan na   Safety Plan will be completed by unit therapist   Anticipated Discharge Disposition other (see comments)     Goal Outcome Evaluation:

## 2025-04-10 NOTE — PLAN OF CARE
"  Rehab Group    Start time: 10:15  End time: 11:00  Patient time total: 45 minutes    attended full group    #4 attended   Group Type: OT Clinic   Group Topic Covered: balanced lifestyle, coping skills, healthy leisure time, relaxation , and social skills       Group Session Detail:  Pt actively participated in occupational therapy clinic to facilitate coping skill exploration, creative expression within personally meaningful activities, and clinical observation of social, cognitive, and kinesthetic performance skills.       Patient Response/Contribution:  cooperative with task, organized, safe use of materials/supplies, actively engaged, engaged socially when prompted, and withdrawn     Patient Detail:   Pt response: Patient arrived to this group on time with one O staff. Patient independently selected to complete a fuzzy color project following prompting from OT. Patient was independent to initiate, gather materials, sequence, and adjust to workspace demands as needed. Demonstrated good focus, planning, and problem solving for selected coloring task. Able to ask for assistance as needed, and socialized appropriately with staff when prompted. Patient demonstrated fair/good eye contact; however, was rather soft spoken. Patient did offer two song suggestions for group members with additional encouragement. Patient appeared to enjoy discussing her interest in \"rock\" and \"soothing\" music. Patient was observed to smile slightly when she joked that her roommates often tell her to turn off her \"sad music.\" Patient engaged in no observed social interactions with peers. Patient independently cleaned up most task supplies at the end of group. Affect appeared flat. However, patient remained calm and cooperative during this group.       [id number] OT Group (2 or more in attendance)      Patient Active Problem List   Diagnosis    Depression    Suicidal ideation    Depression with suicidal ideation    Major depression    Suicidal " behavior    Facial cellulitis    Auditory hallucination    Psychosis, unspecified psychosis type (H)    Morbid obesity (H)    Chronic insomnia    Generalized anxiety disorder    COVID-19    Borderline personality disorder (H)    Depression, unspecified depression type

## 2025-04-10 NOTE — PLAN OF CARE
0BEH IP Unit Acuity Rating Score (UARS)  Patient is given one point for every criteria they meet.    CRITERIA SCORING   On a 72 hour hold, court hold, committed, stay of commitment, or revocation. 1    Patient LOS on BEH unit exceeds 20 days. 0  LOS: 7   Patient under guardianship, 55+, otherwise medically complex, or under age 11. 0   Suicide ideation without relief of precipitating factors. 0   Current plan for suicide. 0   Current plan for homicide. 0   Imminent risk or actual attempt to seriously harm another without relief of factors precipitating the attempt. 0   Severe dysfunction in daily living (ex: complete neglect for self care, extreme disruption in vegetative function, extreme deterioration in social interactions). 1   Recent (last 7 days) or current physical aggression in the ED or on unit. 0   Restraints or seclusion episode in past 72 hours. 0   Recent (last 7 days) or current verbal aggression, agitation, yelling, etc., while in the ED or unit. 0   Active psychosis. 0   Need for constant or near constant redirection (from leaving, from others, etc).  0   Intrusive or disruptive behaviors. 0   Patient requires 3 or more hours of individualized nursing care per 8-hour shift (i.e. for ADLs, meds, therapeutic interventions). 1   TOTAL 3

## 2025-04-10 NOTE — PROGRESS NOTES
"[facility], [place]   Psychiatric Progress Note        Interim History:     The patient's care was discussed with the treatment team during the daily team meeting and/or staff's chart notes were reviewed.  Staff report patient appeared to sleep throughout the night. She was continued on SIO due to repetitive Self injurious behavior, however, no Self injurious behavior was reported yesterday and/or today am. Misty was reported to be cooperative with meds and cares and appeared to be motivated to improve her behavior to be discharged. Attended groups, see RN's note below:    \"[place] [facility] [place] interactive with nursing staff.  She denies pain/discomfort and cold, flu like symptoms when asked.  States having some anxiety and depression.  She endorses urges to cut herself, yet agrees to contract for safety and inform staff if these urges worsen.  She endorses auditory hallucinations and refers to them as her, \" angles.\"  States that her auditory hallucinations are not bothersome in early interactions.  PRN miralax with morning medications.  PRN zyprexa for urges to cut herself at 1430.  She remains on an SIO 1:1 for history of self-injurious behaviors and suicidal ideations while hospitalized.\"     Met with patient with student and SIO staff in the conference room. She walked in willingly and talked to us. Maintained her typical partial eye contact, appeared to be shy, but somewhat more comfortable during today visit. She proudly told us that while she still had urges to injure herself, she was able to resist them: \"I am putting Vaseline on my scratches, ice packs also help\". We discussed her request to discontinue SIO, suggested to wait for at least one day more or weekend to see if she could maintain herself safe. Misty agreed with that. She confirmed periodical hearing Auditory hallucinations that she was calling: \"my angels\". Said that they were not bothersome. She " "did complain about commotion on this unit yesterday evening, had no other questions or concerns.          Medications:     Current Facility-Administered Medications   Medication Dose Route Frequency Provider Last Rate Last Admin    ARIPiprazole (ABILIFY) tablet 5 mg  5 mg Oral Daily Jd Jones MD   5 mg at 04/10/25 0828    citalopram (celeXA) tablet 20 mg  20 mg Oral Daily Jd Jones MD   20 mg at 04/10/25 0828    clonazePAM (klonoPIN) tablet 1 mg  1 mg Oral TID Nissa Morris APRN CNP   1 mg at 04/10/25 1301    docusate sodium (COLACE) capsule 100 mg  100 mg Oral BID Nissa Morris APRN CNP   100 mg at 04/10/25 0828    lamoTRIgine (LaMICtal) tablet 150 mg  150 mg Oral BID Nissa Morris APRN CNP   150 mg at 04/10/25 0828    levothyroxine (SYNTHROID/LEVOTHROID) half-tab 12.5 mcg  12.5 mcg Oral QAM AC Nissa Morris APRN CNP   12.5 mcg at 04/10/25 0829    metFORMIN (GLUCOPHAGE) tablet 1,000 mg  1,000 mg Oral BID w/meals Nissa Morris APRN CNP   1,000 mg at 04/10/25 0828    miconazole (MICATIN) 2 % powder   Topical BID Jd Jones MD   Given at 04/10/25 0907    OLANZapine (zyPREXA) tablet 15 mg  15 mg Oral At Bedtime Nissa Morris APRN CNP   15 mg at 04/09/25 1923    prazosin (MINIPRESS) capsule 2 mg  2 mg Oral At Bedtime Nissa Morris APRN CNP   2 mg at 04/09/25 2045    traZODone (DESYREL) tablet 300 mg  300 mg Oral At Bedtime Nissa Morris APRN CNP   300 mg at 04/09/25 2046          Allergies:   No Known Allergies       Labs:   No results found for this or any previous visit (from the past 24 hours).       Psychiatric Examination:     /76 (BP Location: Right arm, Patient Position: Sitting, Cuff Size: Adult Large)   Pulse 91   Temp 98.1  F (36.7  C)   Resp 16   Ht 1.651 m (5' 5\")   Wt (!) 147.5 kg (325 lb 1.6 oz)   SpO2 95%   BMI 54.10 kg/m    Weight is 325 lbs 1.6 oz  Body mass index is 54.1 " kg/m .    Orthostatic Vitals         Most Recent      Sitting Orthostatic /87 04/10 0900    Standing Orthostatic /95 04/10 0900    Standing Orthostatic Pulse (bpm) 97 04/10 0900           Appearance: awake, alert, dressed in hospital scrubs, appeared as age stated, and morbidly obese  Attitude: somewhat more cooperative  Eye Contact: partial, better  Mood: less anxious and sad   Affect:  constricted mobility;    Speech:  soft, coherent and decreased prosody  Psychomotor Behavior:  no evidence of tardive dyskinesia, dystonia, or tics  Throught Process: linear, concrete  Associations:  no loose associations  Thought Content:  passive suicidal ideation, Self injurious behavior with urges to cut self present (no recent Self injurious behavior, though) and endorses  auditory hallucinations of angels speaking to her, denies visual hallucinations  Insight:  limited, but improving?  Judgement:  limited  Oriented to:  time, person, and place  Attention Span and Concentration:  limited  Recent and Remote Memory:  fair    Clinical Global Impressions  First:7/4 4/4/2025     Most recent:            Precautions:     Behavioral Orders   Procedures    Code 1 - Restrict to Unit    Routine Programming     As clinically indicated    Self Injury Precaution    Status 15     Every 15 minutes.    Status Individual Observation     Patient SIO status reviewed with team/RN.  Please also refer to RN/team documentation for add'l detail.    -SIO staff to monitor following which have contributed to patient being on SIO:  *Self-injury by cutting.  On [facility] unit, she stabbed her wrist with a plastic fork.  She has suicidal ideation and is unable to contract for safety.  *Pt also had coffee stir stick/straw hidden in sock for a couple days, pt gave to RN on 2/8. Please watch to ensure pt is not grabbing stir sticks (or other utensils).  -Possible interventions SIO staff could use to support patient's treatment progress:  *Ensure no  access to potentially unsafe objects, including straws/stir sticks, utensils, other items that she may find on unit or in group.  Finger foods.   *Pt should not have hygiene items in her room. These should be stored in her locker and pt may use them when needed with SIO staff present. Afterward, they should be placed back in locker.   *Please do not give pt any additional items without checking with RN first.    *Offer coping skills.  -When following observed, team will review discontinuation of SIO:  Patient consistently maintains safe behavior and contracts for safety.     Order Specific Question:   CONTINUOUS 24 hours / day     Answer:   5 feet     Order Specific Question:   Indications for SIO     Answer:   Suicide risk     Order Specific Question:   Indications for SIO     Answer:   Self-injury risk    Suicide precautions: Suicide Risk: HIGH; Clinical rationale to override score: Exhibiting Suicidal/self-harm behaviors or thoughts     Patients on Suicide Precautions should have a Combination Diet ordered that includes a Diet selection(s) AND a Behavioral Tray selection for Safe Tray - with utensils, or Safe Tray - NO utensils       Order Specific Question:   Suicide Risk     Answer:   HIGH     Order Specific Question:   Clinical rationale to override score:     Answer:   Exhibiting Suicidal/self-harm behaviors or thoughts          DIagnoses:     Borderline Personality Disorder  Major Depressive Disorder Recurrent with Psychosis         Plan:     Started Celexa 20 mg Daily and Micatin powder 2% for rash under the breast on 4/7/2025. Zyprexa was decreased on 4/3/2025. Pt is fully committed after she waived her right for court hearings. Will as per discussion above continue on SIO and NO roommate order. No medication changes today 4/10/2025. Will continue to provide support and structure. Plan is to return to the [facility] at [facility].         I was present with PA student who participated in the  service and in the documentation of the note. I have verified the history and personally performed the physical exam and medical decision making. I agree with the assessment and plan of care as documented in the note.     Jd Jones MD  [facility] Psychiatry    Total time spent was 36 minutes. Over 50% of times was spent counseling and coordination of care regarding coping skills, medication and discharge planning.

## 2025-04-10 NOTE — PLAN OF CARE
"She is active in the [facility] area and is interactive with nursing staff.  She denies pain/discomfort and cold, flu like symptoms when asked.  States having some anxiety and depression.  She endorses urges to cut herself, yet agrees to contract for safety and inform staff if these urges worsen.  She endorses auditory hallucinations and refers to them as her, \" angles.\"  States that her auditory hallucinations are not bothersome in early interactions.  PRN miralax with morning medications.  PRN zyprexa for urges to cut herself at 1430.  She remains on an SIO 1:1 for history of self-injurious behaviors and suicidal ideations while hospitalized.   "

## 2025-04-10 NOTE — PLAN OF CARE
Team Note Due:  Thursday    Assessment/Intervention/Current Symtoms and Care Coordination:  Chart review and met with team, discussed pt progress, symptomology, and response to treatment.  Discussed the discharge plan and any potential impediments to discharge.      Per Team:  Team reports that the pt is now motivated to return to [facility] home.  Pt did not have SIB behaviors per nursing note of last night.      I called [facility] to see if the recommittment order was issued. I needed to leave a .  I sent a message to [facility] saying pt is now motivated to return home.  They bot sent the recommitment order dated April 9, 2025.  Pt is now on a commitment order and will need a provisional discharge.  I gave pt her copy of this order.       Discharge Plan or Goal:  Return to the Community Residential Services at [facility].      Barriers to Discharge:  Too symptomatic     Referral Status:  Insurance  [facility] and Medicare      Has all established providers      Primary Care Provider:  Name/Clinic:      Number:         Medication Management/Psychiatry:  [facility]  Mental Health Clinic   [address] [address]   [phone]  Betty Cazares, APRN, CNP   [address] [place] [address]   [phone] (Work)   [phone] (Fax)          The[facility]ist:   [facility]               DBT Program  [facility]  3 days a week        /ACT Team:  Name/Clinic: Marleny Núñez  [facility]   Number: [phone]  Fax: [phone]  [email or web address].        CADI   [facility]  [facility]  Assisted Living     [facility]  Name/Clinic: [facility]  Avril Rodríguezisha is the supervisor  Number: .[phone]  Pt lives at:  [address] [place] [address]     Legal Status:  There is no [place].  recommitted - has signed a waiver - waiting  for the new court documents   4/10/25 - C  [facility]  File Number: New recommittment is   Start April 9, 2025  and   expiration date of May 1, 2026      Contacts (include GEORGI status):          Upcoming Meetings and Dates/Important Information and next steps:      Coordinate transfer back to [facility] home.    Needs provisional discharge at time of discharge.

## 2025-04-11 PROCEDURE — 250N000013 HC RX MED GY IP 250 OP 250 PS 637: Performed by: PSYCHIATRY & NEUROLOGY

## 2025-04-11 PROCEDURE — 99232 SBSQ HOSP IP/OBS MODERATE 35: CPT | Performed by: STUDENT IN AN ORGANIZED HEALTH CARE EDUCATION/TRAINING PROGRAM

## 2025-04-11 PROCEDURE — 124N000002 HC R&B MH UMMC

## 2025-04-11 PROCEDURE — 97150 GROUP THERAPEUTIC PROCEDURES: CPT | Mod: GO

## 2025-04-11 PROCEDURE — 250N000013 HC RX MED GY IP 250 OP 250 PS 637: Performed by: NURSE PRACTITIONER

## 2025-04-11 PROCEDURE — 250N000013 HC RX MED GY IP 250 OP 250 PS 637: Performed by: STUDENT IN AN ORGANIZED HEALTH CARE EDUCATION/TRAINING PROGRAM

## 2025-04-11 RX ORDER — ARIPIPRAZOLE 10 MG/1
10 TABLET ORAL DAILY
Status: DISCONTINUED | OUTPATIENT
Start: 2025-04-12 | End: 2025-04-14

## 2025-04-11 RX ORDER — ARIPIPRAZOLE 5 MG/1
5 TABLET ORAL ONCE
Status: COMPLETED | OUTPATIENT
Start: 2025-04-11 | End: 2025-04-11

## 2025-04-11 RX ADMIN — Medication 12.5 MCG: at 08:18

## 2025-04-11 RX ADMIN — METFORMIN HYDROCHLORIDE 1000 MG: 500 TABLET, FILM COATED ORAL at 08:18

## 2025-04-11 RX ADMIN — METFORMIN HYDROCHLORIDE 1000 MG: 500 TABLET, FILM COATED ORAL at 17:35

## 2025-04-11 RX ADMIN — CITALOPRAM HYDROBROMIDE 20 MG: 10 TABLET ORAL at 08:19

## 2025-04-11 RX ADMIN — OLANZAPINE 15 MG: 15 TABLET, FILM COATED ORAL at 20:57

## 2025-04-11 RX ADMIN — MICONAZOLE NITRATE: 20 POWDER TOPICAL at 13:50

## 2025-04-11 RX ADMIN — POLYETHYLENE GLYCOL 3350 17 G: 17 POWDER, FOR SOLUTION ORAL at 08:18

## 2025-04-11 RX ADMIN — DOCUSATE SODIUM 100 MG: 100 CAPSULE, LIQUID FILLED ORAL at 20:57

## 2025-04-11 RX ADMIN — TRAZODONE HYDROCHLORIDE 300 MG: 150 TABLET ORAL at 20:57

## 2025-04-11 RX ADMIN — CLONAZEPAM 1 MG: 0.5 TABLET ORAL at 13:47

## 2025-04-11 RX ADMIN — LAMOTRIGINE 150 MG: 100 TABLET ORAL at 20:57

## 2025-04-11 RX ADMIN — DOCUSATE SODIUM 100 MG: 100 CAPSULE, LIQUID FILLED ORAL at 08:18

## 2025-04-11 RX ADMIN — TRAZODONE HYDROCHLORIDE 50 MG: 50 TABLET ORAL at 00:42

## 2025-04-11 RX ADMIN — LAMOTRIGINE 150 MG: 100 TABLET ORAL at 08:18

## 2025-04-11 RX ADMIN — HYDROXYZINE HYDROCHLORIDE 50 MG: 50 TABLET, FILM COATED ORAL at 00:42

## 2025-04-11 RX ADMIN — PRAZOSIN HYDROCHLORIDE 2 MG: 2 CAPSULE ORAL at 20:57

## 2025-04-11 RX ADMIN — ARIPIPRAZOLE 5 MG: 5 TABLET ORAL at 08:19

## 2025-04-11 RX ADMIN — ARIPIPRAZOLE 5 MG: 5 TABLET ORAL at 13:47

## 2025-04-11 RX ADMIN — CLONAZEPAM 1 MG: 0.5 TABLET ORAL at 20:57

## 2025-04-11 RX ADMIN — CLONAZEPAM 1 MG: 0.5 TABLET ORAL at 08:19

## 2025-04-11 RX ADMIN — OLANZAPINE 10 MG: 10 TABLET, FILM COATED ORAL at 13:47

## 2025-04-11 ASSESSMENT — ACTIVITIES OF DAILY LIVING (ADL)
ADLS_ACUITY_SCORE: 35
ORAL_HYGIENE: INDEPENDENT
ADLS_ACUITY_SCORE: 35
DRESS: SCRUBS (BEHAVIORAL HEALTH);INDEPENDENT
ADLS_ACUITY_SCORE: 35
HYGIENE/GROOMING: INDEPENDENT
ADLS_ACUITY_SCORE: 35
ADLS_ACUITY_SCORE: 35

## 2025-04-11 NOTE — PLAN OF CARE
"Goal Outcome Evaluation:    Plan of Care Reviewed With: patient        Pt and RN met in the pt's room. Pt presents with a flat and blunted affect. Pt endorses anxiety, and depression. It presents as drowsy during the nursing assessment. She states that she continues to have moderate SIB urges, but states that the voices are \"quite\" due to the PRN's and scheduled medications she received in the afternoon (Abilify, klonopin, and Zyprexa). Pt stated that she is able to control her SIB urges a the moment due to the voices being \"quite\". RN offered other non-pharmacological interventions to help with her SIB urges and pt requested ice pack which was provided to her. Pt did not act on SIB urges during evening shift. At the end of the shift pt stated on a scale of 0-10 her urges were at a 6 and she would reach out to staff before engaging in SIB. Pt continues to endorses auditory hallucinations and states she hears angles which are actually disguised and are really \"devils\". Pt can be seen periodically out in the milieu but not social with peers. Pt continues to endorse constipation, but utilizes PRNs as aids. Pt endorses passive SI, denies HI, or SIB. Pt contracts for safety. Pt denies delusions. Pt is medication compliant. Appetite and fluid intake adequate. VSS. Pt denies acute physical pain. No medication side effects reported or observed this shift. Hygiene is adequate.    PRNs Given during shift: NONE    Group Attendance: Did not have groups this evening  Pain: 0/10      /83   Pulse 76   Temp 98.4  F (36.9  C) (Temporal)   Resp 16   Ht 1.651 m (5' 5\")   Wt (!) 147.5 kg (325 lb 1.6 oz)   SpO2 96%   BMI 54.10 kg/m     "

## 2025-04-11 NOTE — PLAN OF CARE
Problem: Sleep Disturbance  Goal: Adequate Sleep/Rest  Outcome: Progressing   Goal Outcome Evaluation:    NOC Shift Report    Pt was in bed at the beginning of the shift. Breathing was regular, spontaneous, and unlabored. Pt did not present with any medical concerns this shift. Trazodone and Hydroxyzine  PRNs given. The plan of care is ongoing.       Pt appears to sleep 5.25 for hours

## 2025-04-11 NOTE — PROGRESS NOTES
"[facility], [place]   Psychiatric Progress Note        Interim History:     The patient's care was discussed with the treatment team during the daily team meeting and/or staff's chart notes were reviewed.  Staff report pt slept for 5.25 hours. Staff report pt has not been cutting or scratching herself for a couple of days but continues to report urges but contracts for safety. SIO was discontinued with the plan to reinstate SIO if pt starts to harm self.Pt take medications as prescribed,  goes to at least one group a day and is out in the milieu.     RN note 4/11/25:   Two hour check in after SIO discontinued. Pt has been working on a sticker book for distraction. Writer checked in on her in her room. She said she was having some strong urges to cut. She walked down to the lounge and sat and talked with writer. She she said the reason to cut was \"to feel the pain\".  She sat with writer and talked about distraction. She said staying in the lounge and watching TV was good. She will continue to work on her sticker book page and turn it in when she is done. She was also using ice chips as a distraction. She was able to come up with 3 things she was grateful for. She did contract for safety when writer left her.    Met pt with student in the conference room. Pt was dressed in hospital scrubs. Pt appeared anxious as evidenced by mild shaking. Pt reports she continues to have urges of cutting, she reports that when she cuts herself so deep, she feels the pain because she deserves it and it is ok. We reassured pt that it is not ok to cut self and she can always talk to staff when she feels those urges.We also educated pt on other forms of distractions such as exercise and a cold pack on her face. The cold pack on the face give a reflex response that slows the urges of self down .She reports that she has found requesting for PRNs, keeping herself distracted, going for groups and talking to staff " helpful. She reports not sleeping well last night because she was having weird dreams last night and she added that in the dream, the voices were telling her to kill herself with a sword and she woke up with increased anxiety and asked to PRN medications for anxiety, she felt better and went back to sleep. We increased Abilify to 10 mg and discontinued SIO. Pt reports feeling safe in the unit and contracted for safety.         Medications:     Current Facility-Administered Medications   Medication Dose Route Frequency Provider Last Rate Last Admin    ARIPiprazole (ABILIFY) tablet 5 mg  5 mg Oral Once Mary Lou Torrez MD        Followed by    [START ON 4/12/2025] ARIPiprazole (ABILIFY) tablet 10 mg  10 mg Oral Daily Mary Lou Torrez MD        citalopram (celeXA) tablet 20 mg  20 mg Oral Daily Jd Jones MD   20 mg at 04/11/25 0819    clonazePAM (klonoPIN) tablet 1 mg  1 mg Oral TID Nissa Morris APRN CNP   1 mg at 04/11/25 0819    docusate sodium (COLACE) capsule 100 mg  100 mg Oral BID Nissa Morris APRN CNP   100 mg at 04/11/25 0818    lamoTRIgine (LaMICtal) tablet 150 mg  150 mg Oral BID Nissa Morris APRN CNP   150 mg at 04/11/25 0818    levothyroxine (SYNTHROID/LEVOTHROID) half-tab 12.5 mcg  12.5 mcg Oral QAM AC Nissa Morris APRN CNP   12.5 mcg at 04/11/25 0818    metFORMIN (GLUCOPHAGE) tablet 1,000 mg  1,000 mg Oral BID w/meals Nissa Morris APRN CNP   1,000 mg at 04/11/25 0818    miconazole (MICATIN) 2 % powder   Topical BID Jd Jones MD   Given at 04/10/25 0907    OLANZapine (zyPREXA) tablet 15 mg  15 mg Oral At Bedtime Nissa Morris APRN CNP   15 mg at 04/10/25 2017    prazosin (MINIPRESS) capsule 2 mg  2 mg Oral At Bedtime Nissa Morris APRN CNP   2 mg at 04/10/25 2018    traZODone (DESYREL) tablet 300 mg  300 mg Oral At Bedtime Nissa Morris APRN CNP   300 mg at 04/10/25 2017          Allergies:   No  "Known Allergies       Labs:   No results found for this or any previous visit (from the past 24 hours).       Psychiatric Examination:     /85   Pulse 98   Temp 97.7  F (36.5  C) (Temporal)   Resp 16   Ht 1.651 m (5' 5\")   Wt (!) 147.5 kg (325 lb 1.6 oz)   SpO2 94%   BMI 54.10 kg/m    Weight is 325 lbs 1.6 oz  Body mass index is 54.1 kg/m .    Orthostatic Vitals         Most Recent      Sitting Orthostatic /87 04/10 0900    Standing Orthostatic /95 04/10 0900    Standing Orthostatic Pulse (bpm) 97 04/10 0900           Appearance: awake, alert, dressed in hospital scrubs, appeared as age stated, and morbidly obese  Attitude: somewhat more cooperative  Eye Contact: partial, better  Mood: anxious and sad   Affect:  constricted mobility;  flat , blunted  Speech:  soft, coherent and decreased prosody  Psychomotor Behavior:  no evidence of tardive dyskinesia, dystonia, or tics  Throught Process: linear, concrete  Associations:  no loose associations  Thought Content:  passive suicidal ideation, Self injurious behavior with urges to cut self present (no recent Self injurious behavior for a few days now) and endorses  auditory hallucinations of angels speaking to her, denies visual hallucinations  Insight:  limited, but improving  Judgement:  limited  Oriented to:  time, person, and place  Attention Span and Concentration:  limited  Recent and Remote Memory:  fair    Clinical Global Impressions  First:7/4 4/4/2025     Most recent:            Precautions:     Behavioral Orders   Procedures    Code 1 - Restrict to Unit    Routine Programming     As clinically indicated    Self Injury Precaution    Status 15     Every 15 minutes.    Suicide precautions: Suicide Risk: HIGH; Clinical rationale to override score: Exhibiting Suicidal/self-harm behaviors or thoughts     Patients on Suicide Precautions should have a Combination Diet ordered that includes a Diet selection(s) AND a Behavioral Tray selection " for Safe Tray - with utensils, or Safe Tray - NO utensils       Order Specific Question:   Suicide Risk     Answer:   HIGH     Order Specific Question:   Clinical rationale to override score:     Answer:   Exhibiting Suicidal/self-harm behaviors or thoughts              Diagnoses:     Borderline Personality Disorder  Major Depressive Disorder Recurrent with Psychosis         Plan:     Started Celexa 20 mg Daily and Micatin powder 2% for rash under the breast on 4/7/2025. Zyprexa was decreased on 4/3/2025. Pt is fully committed after she waived her right for court hearings. SIO was discontinued today 4/11/25 after pt contracted for safety. We increased Abilify to 10 mg today 4/11/2025. Will continue to provide support and structure. Plan is to return to the [facility] at [facility]       I was present with PA student who participated in the service and in the documentation of the note. I have verified the history and personally performed the physical exam and medical decision making. I agree with the assessment and plan of care as documented in the note.     Mary Lou Torrez MD  [facility] Psychiatry         none

## 2025-04-11 NOTE — PROGRESS NOTES
"Two hour check in after SIO discontinued. Pt has been working on a sticker book for distraction. Writer checked in on her in her room. She said she was having some strong urges to cut. She walked down to the lounge and sat and talked with writer. She she said the reason to cut was \"to feel the pain\".  She took scheduled Abilify 5 mg, Klonopin and Zyprexa 10 mg, po, prn at 1345. She sat with writer and talked about distraction. She said staying in the lounge and watching TV was good. She will continue to work on her sticker book page and turn it in when she is done. She was also using ice chips as a distraction. She was able to come up with 3 things she was grateful for. She did contract for safety when writer left her.  "

## 2025-04-11 NOTE — PLAN OF CARE
"  Rehab Group    Start time: 10:30  End time: 12:00  Patient time total: 90 minutes    attended full group     #3 attended   Group Type: OT Clinic   Group Topic Covered: balanced lifestyle, coping skills, healthy leisure time, relaxation , and social skills       Group Session Detail:  Pt actively participated in occupational therapy clinic to facilitate coping skill exploration, creative expression within personally meaningful activities, and clinical observation of social, cognitive, and kinesthetic performance skills.      Patient Response/Contribution:  cooperative with task, organized, safe use of materials/supplies, attentive, actively engaged, engaged socially when prompted, and withdrawn     Patient Detail:  Pt response: Patient arrived to this group on time with one SIO staff and selected to continue work on a previously started fuzzy color task following prompting from OT. Patient was independent to initiate, gather materials, sequence, and adjust to workspace demands as needed. Demonstrated good focus, planning, and problem solving for selected coloring task. Able to ask for assistance as needed, and socialized appropriately with staff when prompted, though answers were often brief. When OT asked how patient was doing today, patient stated \"okay\". OT further inquired about whether or not patient felt a sticker art sheet and coloring sheets that had been given to her yesterday were helpful in managing reported urges to engage in self-injurious behavior. Patient stated that she had been working on the sticker art project and did find it helpful as it helped \"focus [her] mind.\" Patient elected to begin another fuzzy color task after completing the initially selected one. Patient selected to color an image of a lion and when this writer and SIO staff complimented her selection, patient was observed to smile. Patient offered song suggestions for group members with additional encouragement and appeared to enjoy " "discussing her interest in \"[employer]\" briefly with peers and staff. Patient independently initiated and completed clean up of task materials at the end of group. Affect appeared flat; however, brightened slightly on occasion during interactions. Patient remained calm and cooperative during this group.       [id number] OT Group (2 or more in attendance)      Patient Active Problem List   Diagnosis    Depression    Suicidal ideation    Depression with suicidal ideation    Major depression    Suicidal behavior    Facial cellulitis    Auditory hallucination    Psychosis, unspecified psychosis type (H)    Morbid obesity (H)    Chronic insomnia    Generalized anxiety disorder    COVID-19    Borderline personality disorder (H)    Depression, unspecified depression type       "

## 2025-04-11 NOTE — PLAN OF CARE
0BEH IP Unit Acuity Rating Score (UARS)  Patient is given one point for every criteria they meet.    CRITERIA SCORING   On a 72 hour hold, court hold, committed, stay of commitment, or revocation. 1    Patient LOS on BEH unit exceeds 20 days. 0  LOS: 8   Patient under guardianship, 55+, otherwise medically complex, or under age 11. 0   Suicide ideation without relief of precipitating factors. 0   Current plan for suicide. 0   Current plan for homicide. 0   Imminent risk or actual attempt to seriously harm another without relief of factors precipitating the attempt. 0   Severe dysfunction in daily living (ex: complete neglect for self care, extreme disruption in vegetative function, extreme deterioration in social interactions). 1   Recent (last 7 days) or current physical aggression in the ED or on unit. 0   Restraints or seclusion episode in past 72 hours. 0   Recent (last 7 days) or current verbal aggression, agitation, yelling, etc., while in the ED or unit. 0   Active psychosis. 0   Need for constant or near constant redirection (from leaving, from others, etc).  0   Intrusive or disruptive behaviors. 0   Patient requires 3 or more hours of individualized nursing care per 8-hour shift (i.e. for ADLs, meds, therapeutic interventions). 1   TOTAL 3

## 2025-04-11 NOTE — PLAN OF CARE
Problem: Suicidal Behavior  Goal: Suicidal Behavior is Absent or Managed  Outcome: Progressing    Pt had her SIO discontinued today around noon. She denies SI/HI. She still has urges to cut herself. See note from two hour safety evaluation at 2:14. Her expression is blunted and sad. She will smile when joked with. Distraction techniques and coping skills discussed. She finished her sticker project and put it in her locker. She is currently napping after finishing her sticker project. Abilify was increased and she took added dose. No medical concerns expressed.

## 2025-04-12 PROCEDURE — 124N000002 HC R&B MH UMMC

## 2025-04-12 PROCEDURE — 250N000013 HC RX MED GY IP 250 OP 250 PS 637: Performed by: PSYCHIATRY & NEUROLOGY

## 2025-04-12 PROCEDURE — 250N000013 HC RX MED GY IP 250 OP 250 PS 637: Performed by: STUDENT IN AN ORGANIZED HEALTH CARE EDUCATION/TRAINING PROGRAM

## 2025-04-12 PROCEDURE — 250N000013 HC RX MED GY IP 250 OP 250 PS 637: Performed by: NURSE PRACTITIONER

## 2025-04-12 RX ADMIN — METFORMIN HYDROCHLORIDE 1000 MG: 500 TABLET, FILM COATED ORAL at 17:55

## 2025-04-12 RX ADMIN — PRAZOSIN HYDROCHLORIDE 2 MG: 2 CAPSULE ORAL at 20:48

## 2025-04-12 RX ADMIN — CITALOPRAM HYDROBROMIDE 20 MG: 10 TABLET ORAL at 08:11

## 2025-04-12 RX ADMIN — HYDROXYZINE HYDROCHLORIDE 50 MG: 50 TABLET, FILM COATED ORAL at 01:40

## 2025-04-12 RX ADMIN — LAMOTRIGINE 150 MG: 100 TABLET ORAL at 20:48

## 2025-04-12 RX ADMIN — CLONAZEPAM 1 MG: 0.5 TABLET ORAL at 14:08

## 2025-04-12 RX ADMIN — CLONAZEPAM 1 MG: 0.5 TABLET ORAL at 08:11

## 2025-04-12 RX ADMIN — HYDROXYZINE HYDROCHLORIDE 50 MG: 50 TABLET, FILM COATED ORAL at 19:19

## 2025-04-12 RX ADMIN — Medication 12.5 MCG: at 08:11

## 2025-04-12 RX ADMIN — ARIPIPRAZOLE 10 MG: 10 TABLET ORAL at 08:11

## 2025-04-12 RX ADMIN — METFORMIN HYDROCHLORIDE 1000 MG: 500 TABLET, FILM COATED ORAL at 08:12

## 2025-04-12 RX ADMIN — CLONAZEPAM 1 MG: 0.5 TABLET ORAL at 20:48

## 2025-04-12 RX ADMIN — DOCUSATE SODIUM 100 MG: 100 CAPSULE, LIQUID FILLED ORAL at 08:11

## 2025-04-12 RX ADMIN — TRAZODONE HYDROCHLORIDE 50 MG: 50 TABLET ORAL at 01:39

## 2025-04-12 RX ADMIN — MICONAZOLE NITRATE: 20 POWDER TOPICAL at 08:18

## 2025-04-12 RX ADMIN — DOCUSATE SODIUM 100 MG: 100 CAPSULE, LIQUID FILLED ORAL at 20:48

## 2025-04-12 RX ADMIN — TRAZODONE HYDROCHLORIDE 300 MG: 150 TABLET ORAL at 20:48

## 2025-04-12 RX ADMIN — POLYETHYLENE GLYCOL 3350 17 G: 17 POWDER, FOR SOLUTION ORAL at 08:21

## 2025-04-12 RX ADMIN — OLANZAPINE 10 MG: 10 TABLET, FILM COATED ORAL at 11:17

## 2025-04-12 RX ADMIN — OLANZAPINE 15 MG: 15 TABLET, FILM COATED ORAL at 20:48

## 2025-04-12 RX ADMIN — LAMOTRIGINE 150 MG: 100 TABLET ORAL at 08:11

## 2025-04-12 ASSESSMENT — ACTIVITIES OF DAILY LIVING (ADL)
ADLS_ACUITY_SCORE: 35
HYGIENE/GROOMING: INDEPENDENT
ADLS_ACUITY_SCORE: 35
DRESS: SCRUBS (BEHAVIORAL HEALTH);INDEPENDENT
ADLS_ACUITY_SCORE: 35
ORAL_HYGIENE: INDEPENDENT
ADLS_ACUITY_SCORE: 35

## 2025-04-12 NOTE — PLAN OF CARE
Goal Outcome Evaluation:       Pt slept for the first two hours of the shift and came out and received prn trazodone and went back to bed and pt slept for total of 6 hours of sleep. Writer observed pt breathing with no distress with even breathing pattern. Safety checks completed per protocol.

## 2025-04-12 NOTE — PLAN OF CARE
Problem: Nonsuicidal Self-Injury  Goal: Improved Behavior Regulation and Impulse Control (Nonsuicidal Self-Injury)  Outcome: Not Progressing   Goal Outcome Evaluation:      1130: Writer checked in with the pt this morning in her room. She talked about missing her kids as a stressor, but also stress from them asking her for money she doesn't have, for instance for a new phone. Her  [place] lives [place] will also ask her for money. She continues to hear voices of angels who she believes are really devils telling her to cut herself. She then confessed to writer she had done some scratching on both her wrists. They were very superficial and did not break the skin. She scratched with a coffee stir stick. Wrists were cleansed. She did not want any band aids because she said this would make her want to cut by being a trigger. She was told she would have to go on an SIO which made her very sad, since she just got off her SIO yesterday. On call psychiatrist informed. Pt given Zyprexa 10 mg, prn at 1117.    1430: Pt has appeared more comfortable on the SIO, although she said she was upset about it. She said the Zyprexa helped to calm and lower her voices. She has not attempted to self harm. Denied SI, although she had scratched herself. She was visited by her sister and the visit looked good. She said it had been very good. Very blunted affect. Pleasant in all interactions.

## 2025-04-13 PROCEDURE — 250N000013 HC RX MED GY IP 250 OP 250 PS 637: Performed by: PSYCHIATRY & NEUROLOGY

## 2025-04-13 PROCEDURE — 250N000013 HC RX MED GY IP 250 OP 250 PS 637: Performed by: STUDENT IN AN ORGANIZED HEALTH CARE EDUCATION/TRAINING PROGRAM

## 2025-04-13 PROCEDURE — 124N000002 HC R&B MH UMMC

## 2025-04-13 PROCEDURE — 250N000013 HC RX MED GY IP 250 OP 250 PS 637: Performed by: NURSE PRACTITIONER

## 2025-04-13 RX ADMIN — LAMOTRIGINE 150 MG: 100 TABLET ORAL at 08:20

## 2025-04-13 RX ADMIN — LAMOTRIGINE 150 MG: 100 TABLET ORAL at 20:52

## 2025-04-13 RX ADMIN — PRAZOSIN HYDROCHLORIDE 2 MG: 2 CAPSULE ORAL at 20:52

## 2025-04-13 RX ADMIN — CLONAZEPAM 1 MG: 0.5 TABLET ORAL at 20:52

## 2025-04-13 RX ADMIN — CLONAZEPAM 1 MG: 0.5 TABLET ORAL at 08:21

## 2025-04-13 RX ADMIN — CITALOPRAM HYDROBROMIDE 20 MG: 10 TABLET ORAL at 08:21

## 2025-04-13 RX ADMIN — DOCUSATE SODIUM 100 MG: 100 CAPSULE, LIQUID FILLED ORAL at 20:52

## 2025-04-13 RX ADMIN — ARIPIPRAZOLE 10 MG: 10 TABLET ORAL at 08:21

## 2025-04-13 RX ADMIN — CLONAZEPAM 1 MG: 0.5 TABLET ORAL at 14:26

## 2025-04-13 RX ADMIN — TRAZODONE HYDROCHLORIDE 50 MG: 50 TABLET ORAL at 02:20

## 2025-04-13 RX ADMIN — TRAZODONE HYDROCHLORIDE 300 MG: 150 TABLET ORAL at 20:52

## 2025-04-13 RX ADMIN — METFORMIN HYDROCHLORIDE 1000 MG: 500 TABLET, FILM COATED ORAL at 17:45

## 2025-04-13 RX ADMIN — OLANZAPINE 15 MG: 15 TABLET, FILM COATED ORAL at 20:52

## 2025-04-13 RX ADMIN — HYDROXYZINE HYDROCHLORIDE 50 MG: 50 TABLET, FILM COATED ORAL at 12:11

## 2025-04-13 RX ADMIN — DOCUSATE SODIUM 100 MG: 100 CAPSULE, LIQUID FILLED ORAL at 08:21

## 2025-04-13 RX ADMIN — POLYETHYLENE GLYCOL 3350 17 G: 17 POWDER, FOR SOLUTION ORAL at 08:16

## 2025-04-13 RX ADMIN — Medication 12.5 MCG: at 08:21

## 2025-04-13 RX ADMIN — METFORMIN HYDROCHLORIDE 1000 MG: 500 TABLET, FILM COATED ORAL at 08:20

## 2025-04-13 RX ADMIN — OLANZAPINE 10 MG: 10 TABLET, FILM COATED ORAL at 14:31

## 2025-04-13 ASSESSMENT — ACTIVITIES OF DAILY LIVING (ADL)
ADLS_ACUITY_SCORE: 35
DRESS: SCRUBS (BEHAVIORAL HEALTH);INDEPENDENT
ADLS_ACUITY_SCORE: 35
ADLS_ACUITY_SCORE: 35
ORAL_HYGIENE: INDEPENDENT
ADLS_ACUITY_SCORE: 35
HYGIENE/GROOMING: INDEPENDENT
ADLS_ACUITY_SCORE: 35

## 2025-04-13 NOTE — PLAN OF CARE
Problem: Nonsuicidal Self-Injury  Goal: Improved Behavior Regulation and Impulse Control (Nonsuicidal Self-Injury)  Outcome: Not Progressing    Pt continues on SIO 1:1 for thought of cutting herself. She continues to have thoughts have thoughts of self harm. She told writer the cutting helps with the depression she gets having so many negative thoughts. Talked of coping skills that have helped her in the past and she is going to try and use DBT skills she has learned and do walking in the hallway. She did do some walking after meeting with writer. Very blunted and sad looking. Will smile occasionally when writer jokes with her. At 1211 she took Hydroxyzine, 50 mg, prn for high anxiety.     1400: Pt took a shower today. She ate lunch, then spent some time in her room listening to music. She is currently napping. No self harm gestures today. Pt woke from her nap and writer brought her scheduled Klonopin. She looked very tense. She said she had very strong urge for self harm. Pt given Zyprexa, 10 mg, prn at 1431. Pt agreed to state 3 things she is grateful for to do something positive, and did so with writer also doing it.

## 2025-04-13 NOTE — PLAN OF CARE
"Goal Outcome Evaluation:    Plan of Care Reviewed With: patient        Pt and RN met in the pt's room along side SIO staff. Pt presents with a flat affect and depressed mood. She endorses SI thoughts states she thinks about \"going to sleep and never waking up\". Pt contracts for safety. Pt did not have an incident of SIB during this evening shift, but did endorse SIB urges stating they were a 7/10 and stated she would like a PRN. Hydroxyzine was offered and administered. Pt continues to endorse auditory command hallucinations which she calls \"angels\", but stated they were not \"as loud\". Pt denies HI, or SIB. Pt denies delusions. Pt can be seen periodically out in the milieu but not social with peers. Pt is medication compliant. Appetite and fluid intake adequate. VSS. Pt denies acute physical pain. No medication side effects reported or observed this shift. Hygiene is adequate. No issues with bowel or bladder noted.     PRNs Given during shift: hydrOXYzine HCl (ATARAX) tablet 50 mg, 50 mg at 04/12/25 1919    Group Attendance: Did not attend groups  Pain: 0/10     /90   Pulse 90   Temp 97.7  F (36.5  C) (Oral)   Resp 16   Ht 1.651 m (5' 5\")   Wt (!) 147.5 kg (325 lb 1.6 oz)   SpO2 96%   BMI 54.10 kg/m                "

## 2025-04-13 NOTE — PLAN OF CARE
Problem: Suicide Risk  Goal: Absence of Self-Harm  Outcome: Progressing     Problem: Sleep Disturbance  Goal: Adequate Sleep/Rest  Outcome: Progressing     Problem: Nonsuicidal Self-Injury  Goal: Improved Behavior Regulation and Impulse Control (Nonsuicidal Self-Injury)  Outcome: Progressing   Goal Outcome Evaluation:       Pt sleeping when pt assumed shift. Writer observed pt breathing with no distress, even breathing pattern. Pt on SIO for self injury behavior and no self injury behavior reported or observed. Safety checks completed per protocol.

## 2025-04-14 PROCEDURE — 250N000013 HC RX MED GY IP 250 OP 250 PS 637: Performed by: STUDENT IN AN ORGANIZED HEALTH CARE EDUCATION/TRAINING PROGRAM

## 2025-04-14 PROCEDURE — 250N000013 HC RX MED GY IP 250 OP 250 PS 637: Performed by: PSYCHIATRY & NEUROLOGY

## 2025-04-14 PROCEDURE — 250N000013 HC RX MED GY IP 250 OP 250 PS 637: Performed by: NURSE PRACTITIONER

## 2025-04-14 PROCEDURE — 124N000002 HC R&B MH UMMC

## 2025-04-14 PROCEDURE — 99233 SBSQ HOSP IP/OBS HIGH 50: CPT | Performed by: PSYCHIATRY & NEUROLOGY

## 2025-04-14 RX ADMIN — HYDROXYZINE HYDROCHLORIDE 50 MG: 50 TABLET, FILM COATED ORAL at 01:53

## 2025-04-14 RX ADMIN — LAMOTRIGINE 150 MG: 100 TABLET ORAL at 20:21

## 2025-04-14 RX ADMIN — POLYETHYLENE GLYCOL 3350 17 G: 17 POWDER, FOR SOLUTION ORAL at 08:33

## 2025-04-14 RX ADMIN — TRAZODONE HYDROCHLORIDE 50 MG: 50 TABLET ORAL at 01:53

## 2025-04-14 RX ADMIN — DOCUSATE SODIUM 100 MG: 100 CAPSULE, LIQUID FILLED ORAL at 20:21

## 2025-04-14 RX ADMIN — ARIPIPRAZOLE 10 MG: 10 TABLET ORAL at 08:28

## 2025-04-14 RX ADMIN — TRAZODONE HYDROCHLORIDE 300 MG: 150 TABLET ORAL at 20:21

## 2025-04-14 RX ADMIN — METFORMIN HYDROCHLORIDE 1000 MG: 500 TABLET, FILM COATED ORAL at 08:29

## 2025-04-14 RX ADMIN — SENNOSIDES AND DOCUSATE SODIUM 1 TABLET: 50; 8.6 TABLET ORAL at 14:44

## 2025-04-14 RX ADMIN — CITALOPRAM HYDROBROMIDE 20 MG: 10 TABLET ORAL at 08:28

## 2025-04-14 RX ADMIN — PRAZOSIN HYDROCHLORIDE 2 MG: 2 CAPSULE ORAL at 20:21

## 2025-04-14 RX ADMIN — METFORMIN HYDROCHLORIDE 1000 MG: 500 TABLET, FILM COATED ORAL at 18:12

## 2025-04-14 RX ADMIN — DOCUSATE SODIUM 100 MG: 100 CAPSULE, LIQUID FILLED ORAL at 08:28

## 2025-04-14 RX ADMIN — Medication 12.5 MCG: at 08:29

## 2025-04-14 RX ADMIN — LAMOTRIGINE 150 MG: 100 TABLET ORAL at 08:29

## 2025-04-14 RX ADMIN — CLONAZEPAM 1 MG: 0.5 TABLET ORAL at 14:34

## 2025-04-14 RX ADMIN — OLANZAPINE 15 MG: 15 TABLET, FILM COATED ORAL at 20:22

## 2025-04-14 RX ADMIN — CLONAZEPAM 1 MG: 0.5 TABLET ORAL at 08:28

## 2025-04-14 RX ADMIN — MICONAZOLE NITRATE: 20 POWDER TOPICAL at 20:24

## 2025-04-14 RX ADMIN — CLONAZEPAM 1 MG: 0.5 TABLET ORAL at 20:21

## 2025-04-14 RX ADMIN — HYDROXYZINE HYDROCHLORIDE 50 MG: 50 TABLET, FILM COATED ORAL at 11:54

## 2025-04-14 ASSESSMENT — ACTIVITIES OF DAILY LIVING (ADL)
ADLS_ACUITY_SCORE: 35
LAUNDRY: WITH SUPERVISION
HYGIENE/GROOMING: INDEPENDENT
DRESS: INDEPENDENT;SCRUBS (BEHAVIORAL HEALTH)
ADLS_ACUITY_SCORE: 35
ORAL_HYGIENE: INDEPENDENT
ADLS_ACUITY_SCORE: 35

## 2025-04-14 NOTE — PLAN OF CARE
"Goal Outcome Evaluation:    Plan of Care Reviewed With: patient        Pt and RN met in the pt's room. Pt presents with a flat and blunted affect. Mood is depressed. Pt continues to endorse SI thoughts of \"going to sleep and never waking up\". Pt contracts for safety. Pt also states she is having SIB urges and rates it as a 7/10. Pt endorses command auditory hallucinations, but stated they were \"quite\". Pt utilized non-pharmacological interventions such as ice to help with the urges. Declined PRN hydroxyzine for anxiety stating \"I think I am going to be fine\". Pt denies any HI, or SIB. Pt denies delusions.    PRNs Given during shift: NONE    Group Attendance: Did not attend group  Pain: 0/10     /83   Pulse 84   Temp 97.9  F (36.6  C) (Temporal)   Resp 16   Ht 1.651 m (5' 5\")   Wt (!) 145.4 kg (320 lb 9.6 oz)   SpO2 94%   BMI 53.35 kg/m                "

## 2025-04-14 NOTE — PLAN OF CARE
0BEH IP Unit Acuity Rating Score (UARS)  Patient is given one point for every criteria they meet.    CRITERIA SCORING   On a 72 hour hold, court hold, committed, stay of commitment, or revocation. 1    Patient LOS on BEH unit exceeds 20 days. 0  LOS: 11   Patient under guardianship, 55+, otherwise medically complex, or under age 11. 0   Suicide ideation without relief of precipitating factors. 0   Current plan for suicide. 0   Current plan for homicide. 0   Imminent risk or actual attempt to seriously harm another without relief of factors precipitating the attempt. 0   Severe dysfunction in daily living (ex: complete neglect for self care, extreme disruption in vegetative function, extreme deterioration in social interactions). 1   Recent (last 7 days) or current physical aggression in the ED or on unit. 0   Restraints or seclusion episode in past 72 hours. 0   Recent (last 7 days) or current verbal aggression, agitation, yelling, etc., while in the ED or unit. 0   Active psychosis. 0   Need for constant or near constant redirection (from leaving, from others, etc).  0   Intrusive or disruptive behaviors. 0   Patient requires 3 or more hours of individualized nursing care per 8-hour shift (i.e. for ADLs, meds, therapeutic interventions). 1   TOTAL 3

## 2025-04-14 NOTE — PLAN OF CARE
Goal Outcome Evaluation:         Patient was in bed at the beginning of the shift, breathing quietly and unlabored.   No concerns reported or noted this shift.  Will continue to Monitor.  PRNs: Hydroxyzine and Trazodone @ 0153 hrs      Pt slept 6 hrs

## 2025-04-14 NOTE — PROGRESS NOTES
"[facility], [place]   Psychiatric Progress Note        Interim History:     The patient's care was discussed with the treatment team during the daily team meeting and/or staff's chart notes were reviewed.  Staff report patient appeared to sleep throughout the night for about 6 hours. Was reported to be compliant with meds and cares, went to few groups. She again had episodes of superficial cutting/scratching through this weekend and SIO that was discontinued over weekend had to be restarted. She does that in bathroom despite being on SIO, see RN's note below:    \"I'm in the middle, there's been some improvement. The voices are less loud.\" Depression is 7-8/10 and anxiety is 5/10. Has urges for self harm. Patient superficially scratched right inner wrist this morning. Declined to say what she used to scratch self and declined to give it to writer. \"I need it.\" This was reported to Dr. Jones. Has suicidal thoughts, \"I wish I could lay down and not wake up.\" Thoughts are more clear and is able to focus well. States went to 1-2 groups. Does not initiate conversation with others but if spoken to will respond. Late morning patient was having a difficult time wanting to hurt self. Staff talked with patient encouraging her to use healthy coping skills. Patient also took Hydroxyzine  with some relief. Patient informed writer she scratches herself with something when in the bathroom. Patient has scratched self twice today using something. Patient will not say what item is or where she has it. Patient washed right wrist with soap and water and dried the area with staff watching. Patient requested Miralax and Senokot for constipation. Had a small BM yesterday and a normal BM two days ago. \"     Met with patient with student and SIO staff in the conference room. She walked in willingly and talked to us. Maintained her typical partial eye contact, speech was soft and number of times we had to " "ask her to speak up and repeat what she was saying. She first, could not identify reasons for Self injurious behavior, but with more pointed questions told us that her sister over weekend told her about Misty's 18 year old son \"hanging out with the wrong people\". She added that sister found out that Misty's son and his friends had a party at sister's house with alcohol and sister kicked friends out. Misty told us that she was blaming self for her son's behavior: \"I am sorry that I was not with him when he needed me and had to go to live with my sister\". We spent significant amount of time talking to Misty about her symptoms and the fact that having MI issues there was not too much she could do for her son. She appeared to be only partially receptive to our words and asked when she could be discharged. When we asked her if she still entertained thoughts of using  to harm self, Misty said that she still had those thoughts and would buy  after coming home. We made it clear that as were were not so much concerned about superficial cuts and scratches, using box cutters could be really dangerous for Misty and could kill her and it is why she could not be discharged now. She passively agreed with us and had no other questions or concerns.          Medications:     Current Facility-Administered Medications   Medication Dose Route Frequency Provider Last Rate Last Admin    [START ON 4/15/2025] ARIPiprazole (ABILIFY) half-tab 15 mg  15 mg Oral Daily Jd Jones MD        citalopram (celeXA) tablet 20 mg  20 mg Oral Daily Jd Jones MD   20 mg at 04/14/25 0828    clonazePAM (klonoPIN) tablet 1 mg  1 mg Oral TID Nissa Morris APRN CNP   1 mg at 04/14/25 1434    docusate sodium (COLACE) capsule 100 mg  100 mg Oral BID Nissa Morris APRN CNP   100 mg at 04/14/25 0828    lamoTRIgine (LaMICtal) tablet 150 mg  150 mg Oral BID Nissa Morris APRN CNP   150 mg at " "04/14/25 0829    levothyroxine (SYNTHROID/LEVOTHROID) half-tab 12.5 mcg  12.5 mcg Oral QAM AC Nissa Morris APRN CNP   12.5 mcg at 04/14/25 0829    metFORMIN (GLUCOPHAGE) tablet 1,000 mg  1,000 mg Oral BID w/meals Nissa Morris APRN CNP   1,000 mg at 04/14/25 0829    miconazole (MICATIN) 2 % powder   Topical BID Jd Jones MD   Given at 04/12/25 0818    OLANZapine (zyPREXA) tablet 15 mg  15 mg Oral At Bedtime Nissa Morris APRN CNP   15 mg at 04/13/25 2052    prazosin (MINIPRESS) capsule 2 mg  2 mg Oral At Bedtime Nissa Morris APRN CNP   2 mg at 04/13/25 2052    traZODone (DESYREL) tablet 300 mg  300 mg Oral At Bedtime Nissa Morris APRN CNP   300 mg at 04/13/25 2052          Allergies:   No Known Allergies       Labs:   No results found for this or any previous visit (from the past 24 hours).       Psychiatric Examination:     /83   Pulse 91   Temp 97.2  F (36.2  C) (Temporal)   Resp 16   Ht 1.651 m (5' 5\")   Wt (!) 145.4 kg (320 lb 9.6 oz)   SpO2 94%   BMI 53.35 kg/m    Weight is 320 lbs 9.6 oz  Body mass index is 53.35 kg/m .    Orthostatic Vitals         Most Recent      Standing Orthostatic /89 04/13 0811    Standing Orthostatic Pulse (bpm) 107 04/13 0811           Appearance: awake, alert, dressed in hospital scrubs, appeared as age stated, and morbidly obese  Attitude: somewhat cooperative  Eye Contact: partial,   Mood: anxious and sad   Affect:  constricted mobility;    Speech:  soft, coherent and decreased prosody  Psychomotor Behavior:  no evidence of tardive dyskinesia, dystonia, or tics  Throught Process: linear, concrete  Associations:  no loose associations  Thought Content:  passive suicidal ideation, Self injurious behavior with urges to cut self present (no recent Self injurious behavior, though) and endorses  auditory hallucinations of angels speaking to her, denies visual hallucinations. Today reports thoughts of " buying  and harming herself with it;  Insight:  limited,    Judgement:  limited  Oriented to:  time, person, and place  Attention Span and Concentration:  limited  Recent and Remote Memory:  fair    Clinical Global Impressions  First:7/4 4/4/2025     Most recent:            Precautions:     Behavioral Orders   Procedures    Code 1 - Restrict to Unit    Routine Programming     As clinically indicated    Self Injury Precaution    Status 15     Every 15 minutes.    Status Individual Observation     Patient SIO status reviewed with team/RN.  Please also refer to RN/team documentation for add'l detail.     -SIO staff to monitor following which have contributed to patient being on SIO:   *Self-injury by cutting.  On [facility] unit, she stabbed her wrist with a plastic fork.  She was taken off SIO on 4/11 and scratched herself with a straw on 4/12.  She has suicidal ideation and is unable to contract for safety.   *Pt also had coffee stir stick/straw hidden in sock for a couple days, pt gave to RN on 2/8. Please watch to ensure pt is not grabbing stir sticks (or other utensils).   -Possible interventions SIO staff could use to support patient's treatment progress:   *Ensure no access to potentially unsafe objects, including straws/stir sticks, utensils, other items that she may find on unit or in group.  Finger foods.   *Pt should not have hygiene items in her room. These should be stored in her locker and pt may use them when needed with SIO staff present. Afterward, they should be placed back in locker.   *Please do not give pt any additional items without checking with RN first.    *Offer coping skills.   -When following observed, team will review discontinuation of SIO:   Patient consistently maintains safe behavior and contracts for safety.     Order Specific Question:   CONTINUOUS 24 hours / day     Answer:   5 feet     Order Specific Question:   Indications for SIO     Answer:   Self-injury risk    Suicide  precautions: Suicide Risk: HIGH; Clinical rationale to override score: Exhibiting Suicidal/self-harm behaviors or thoughts     Patients on Suicide Precautions should have a Combination Diet ordered that includes a Diet selection(s) AND a Behavioral Tray selection for Safe Tray - with utensils, or Safe Tray - NO utensils       Order Specific Question:   Suicide Risk     Answer:   HIGH     Order Specific Question:   Clinical rationale to override score:     Answer:   Exhibiting Suicidal/self-harm behaviors or thoughts          DIagnoses:     Borderline Personality Disorder  Major Depressive Disorder Recurrent with Psychosis         Plan:     Started Celexa 20 mg Daily and Micatin powder 2% for rash under the breast on 4/7/2025. Zyprexa was decreased on 4/3/2025. Will increase Abilify dose on 4/14/2025. Will as per discussion above continue on SIO and NO roommate order. Will continue to provide support and structure. [facility].         I was present with PA student who participated in the service and in the documentation of the note. I have verified the history and personally performed the physical exam and medical decision making. I agree with the assessment and plan of care as documented in the note.     Jd Jones MD  [facility]    Total time spent was 39 minutes. Over 50% of times was spent counseling and coordination of care regarding coping skills, medication and discharge planning.

## 2025-04-14 NOTE — PROVIDER NOTIFICATION
04/14/25 2627   C-SSRS (Daily/Shift Screen)   Q2 Suicidal Thoughts (Since Last Contact) 1-->yes   Q3 Have you been thinking about how you might do this? 1-->yes   Q4 Suicidal Intent without Specific Plan 0-->no   Q5 Suicide Intent with Specific Plan 0-->no   Q6 Suicide Behavior 0-->no   Level of Risk per Screen moderate risk   Assess Risk to Self and Maintain Safety   Behavior Management behavioral plan reviewed   Self-Harm Prevention environmental self-harm risks assessed   Enhanced Safety Measures review medications for side effects with activity   Promote Psychosocial Wellbeing   Family/Support System Care self-care encouraged   Sleep/Rest Enhancement awakenings minimized;noise level reduced;medication;consistent schedule promoted;comfort measures;regular sleep/rest pattern promoted;relaxation techniques promoted;room darkened;visualization   Supportive Measures active listening utilized;relaxation techniques promoted;self-care encouraged;self-reflection promoted;self-responsibility promoted;verbalization of feelings encouraged;positive reinforcement provided;goal-setting facilitated   Establish Safety Plan and Continuity of Care   Safe Transition Promotion protective factors promoted   Environment of Care Checklist   Potentially harmful objects out of patient reach? yes   Personal belongings secured? yes   Patient dressed in hospital-provided attire only? yes   Plastic bags out of patient reach? yes   Patient care equipment (cords, cables, call bells, lines, and drains) shortened, removed, or accounted for? yes   Potentially toxic materials removed or secured? yes   Sharps container removed or secured? yes   Cabinets secured? yes   Room secured by RN per shift? yes

## 2025-04-14 NOTE — PLAN OF CARE
"  Problem: Depressive Signs/Symptoms  Goal: Improved Mood Symptoms (Depressive Signs/Symptoms)  Outcome: Not Progressing   Goal Outcome Evaluation:    Plan of Care Reviewed With: patient      \"I'm in the middle, there's been some improvement. The voices are less loud.\" Depression is 7-8/10 and anxiety is 5/10. Has urges for self harm. Patient superficially scratched right inner wrist this morning. Declined to say what she used to scratch self and declined to give it to writer. \"I need it.\" This was reported to Dr. Jones. Has suicidal thoughts, \"I wish I could lay down and not wake up.\" Thoughts are more clear and is able to focus well. States went to 1-2 groups. Does not initiate conversation with others but if spoken to will respond. Late morning patient was having a difficult time wanting to hurt self. Staff talked with patient encouraging her to use healthy coping skills. Patient also took Hydroxyzine  with some relief. Patient informed writer she scratches herself with something when in the bathroom. Patient has scratched self twice today using something. Patient will not say what item is or where she has it. Patient washed right wrist with soap and water and dried the area with staff watching. Patient requested Miralax and Senokot for constipation. Had a small BM yesterday and a normal BM two days ago.            "

## 2025-04-15 PROCEDURE — 124N000002 HC R&B MH UMMC

## 2025-04-15 PROCEDURE — 250N000013 HC RX MED GY IP 250 OP 250 PS 637: Performed by: PSYCHIATRY & NEUROLOGY

## 2025-04-15 PROCEDURE — 97150 GROUP THERAPEUTIC PROCEDURES: CPT | Mod: GO

## 2025-04-15 PROCEDURE — 250N000013 HC RX MED GY IP 250 OP 250 PS 637: Performed by: NURSE PRACTITIONER

## 2025-04-15 PROCEDURE — 99233 SBSQ HOSP IP/OBS HIGH 50: CPT | Performed by: PSYCHIATRY & NEUROLOGY

## 2025-04-15 RX ORDER — CITALOPRAM HYDROBROMIDE 10 MG/1
30 TABLET ORAL DAILY
Status: DISCONTINUED | OUTPATIENT
Start: 2025-04-16 | End: 2025-04-24 | Stop reason: HOSPADM

## 2025-04-15 RX ADMIN — MICONAZOLE NITRATE: 20 POWDER TOPICAL at 21:57

## 2025-04-15 RX ADMIN — DOCUSATE SODIUM 100 MG: 100 CAPSULE, LIQUID FILLED ORAL at 20:24

## 2025-04-15 RX ADMIN — Medication 12.5 MCG: at 07:10

## 2025-04-15 RX ADMIN — OLANZAPINE 15 MG: 15 TABLET, FILM COATED ORAL at 22:00

## 2025-04-15 RX ADMIN — PRAZOSIN HYDROCHLORIDE 2 MG: 2 CAPSULE ORAL at 21:57

## 2025-04-15 RX ADMIN — CLONAZEPAM 1 MG: 0.5 TABLET ORAL at 15:04

## 2025-04-15 RX ADMIN — SENNOSIDES AND DOCUSATE SODIUM 1 TABLET: 50; 8.6 TABLET ORAL at 08:33

## 2025-04-15 RX ADMIN — TRAZODONE HYDROCHLORIDE 300 MG: 150 TABLET ORAL at 21:57

## 2025-04-15 RX ADMIN — METFORMIN HYDROCHLORIDE 1000 MG: 500 TABLET, FILM COATED ORAL at 08:25

## 2025-04-15 RX ADMIN — CLONAZEPAM 1 MG: 0.5 TABLET ORAL at 08:25

## 2025-04-15 RX ADMIN — CLONAZEPAM 1 MG: 0.5 TABLET ORAL at 20:24

## 2025-04-15 RX ADMIN — Medication 15 MG: at 08:25

## 2025-04-15 RX ADMIN — POLYETHYLENE GLYCOL 3350 17 G: 17 POWDER, FOR SOLUTION ORAL at 08:33

## 2025-04-15 RX ADMIN — LAMOTRIGINE 150 MG: 100 TABLET ORAL at 20:24

## 2025-04-15 RX ADMIN — METFORMIN HYDROCHLORIDE 1000 MG: 500 TABLET, FILM COATED ORAL at 18:00

## 2025-04-15 RX ADMIN — DOCUSATE SODIUM 100 MG: 100 CAPSULE, LIQUID FILLED ORAL at 08:25

## 2025-04-15 RX ADMIN — HYDROXYZINE HYDROCHLORIDE 50 MG: 50 TABLET, FILM COATED ORAL at 11:20

## 2025-04-15 RX ADMIN — CITALOPRAM HYDROBROMIDE 20 MG: 10 TABLET ORAL at 08:25

## 2025-04-15 RX ADMIN — LAMOTRIGINE 150 MG: 100 TABLET ORAL at 08:25

## 2025-04-15 RX ADMIN — OLANZAPINE 10 MG: 10 TABLET, FILM COATED ORAL at 15:04

## 2025-04-15 ASSESSMENT — ACTIVITIES OF DAILY LIVING (ADL)
ADLS_ACUITY_SCORE: 46
ADLS_ACUITY_SCORE: 35
ORAL_HYGIENE: INDEPENDENT
ADLS_ACUITY_SCORE: 35
ADLS_ACUITY_SCORE: 46
ADLS_ACUITY_SCORE: 35
ADLS_ACUITY_SCORE: 35
ADLS_ACUITY_SCORE: 46
ADLS_ACUITY_SCORE: 35
HYGIENE/GROOMING: INDEPENDENT
DRESS: SCRUBS (BEHAVIORAL HEALTH)
ADLS_ACUITY_SCORE: 35
ADLS_ACUITY_SCORE: 46
ADLS_ACUITY_SCORE: 35
ADLS_ACUITY_SCORE: 35

## 2025-04-15 NOTE — PLAN OF CARE
Pt's assigned RN asked writer to assist pt with afternoon medications. Pt asleep when writer went to check on pt. Writer asked SIO staff that writer would assist pt when she awakes. Pt approached writer around 1500 and requested scheduled medication and PRN zyprexa. VSS. Pt reports that anxiety is high and she has the urge to cut. Pt reports she hears angels telling her to cut. Pt given scheduled medication, along with PRN zyprexa. Pt visible shortly afterward sitting in lounge and talking with SIO staff. Will notify oncoming shift as well. Will continue to monitor and assist as needed.

## 2025-04-15 NOTE — PROGRESS NOTES
"[facility], [place]   Psychiatric Progress Note        Interim History:     The patient's care was discussed with the treatment team during the daily team meeting and/or staff's chart notes were reviewed.  Staff report patient appeared to sleep throughout the night for about 6.5 hours. Was reported to be compliant with meds and cares, went to few groups. She continues with Self injurious behavior which at times seems to be gamey/attention seeking when she tells staff that she has tools to harm self and, then, refuses to say where she hides it, see RN's note below:    \"Pt visible in the milieu and watched TV while keeping to self. Pt endorsed moderate anxiety and high depression and declined prn medications. Pt endorsed thought of self injury behavior and that she have a secret weapon and she will not tell staff where the weapon is. Pt room and bathroom search and a straw hiding in the toilet tissue found. Pt care plan updated for pt not to have a straw and med cups. Pt is currently having safety tray and on SIO 5 ft. Pt  refused commit for safety. Pt endorsed auditory hallucination and the voices are saying \"your are an anmol and it not yet time for you to  to become and anmol\". Pt denied visual hallucination and HI. Pt described mood as low and presented with flat affect, but calm and polite.   Adequate fluids and food intake, voiding freely and no BM concern per pt. Pt denied pain and shortness of breath and vital sign stable. /86 (BP Location: Right arm, Patient Position: Sitting, Cuff Size: Adult Large)   Pulse 78   Temp 97.9  F (36.6  C) (Oral)   Resp 18   Ht 1.651 m (5' 5\")   Wt (!) 145.4 kg (320 lb 9.6 oz)   SpO2 95%   BMI 53.35 kg/m  \"     Met with patient with student and SIO staff in the conference room. She walked in willingly and talked to us. Maintained her typical partial eye contact, today it was worse than usual. Speech was again soft and difficult to " "understand. Misty repeated that she not only had thoughts of harming self, but also had active thoughts of killing self when discharged home by using box cutters: \"the blade is short and clean, not wavy as in other knifes\". She was unable to identify any reasons to still be alive, even, after we mentioned that children of parents who suicided have higher chances to suicide self and that she is needed to be alive for her son, sister. Misty remained not receptive and, in the end, told us that \"I am tired to be alive\". Interestingly enough, she immediately after that asked to discharge her and didn't seem to comprehend why she could not be discharged with active Suicidal ideation. We spent long time talking to Misty about criteria for her discharge, encouraged her to go to groups and try to do her best to stay safe. We also informed her that we would increase her dose of Celexa and would try to have SIO staff to be female so that they could monitor her in bathroom. Misty passively indicated that she understood and had no other questions or concerns.          Medications:     Current Facility-Administered Medications   Medication Dose Route Frequency Provider Last Rate Last Admin    ARIPiprazole (ABILIFY) half-tab 15 mg  15 mg Oral Daily Jd Jones MD   15 mg at 04/15/25 0825    [START ON 4/16/2025] citalopram (celeXA) tablet 30 mg  30 mg Oral Daily Jd Jones MD        clonazePAM (klonoPIN) tablet 1 mg  1 mg Oral TID Nissa Morris APRN CNP   1 mg at 04/15/25 0825    docusate sodium (COLACE) capsule 100 mg  100 mg Oral BID Nissa Morris APRN CNP   100 mg at 04/15/25 0825    lamoTRIgine (LaMICtal) tablet 150 mg  150 mg Oral BID Nissa Morris APRN CNP   150 mg at 04/15/25 0825    levothyroxine (SYNTHROID/LEVOTHROID) half-tab 12.5 mcg  12.5 mcg Oral QAM AC Nissa Morris APRN CNP   12.5 mcg at 04/15/25 0710    metFORMIN (GLUCOPHAGE) tablet 1,000 mg  1,000 mg " "Oral BID w/meals Nissa Morris APRN CNP   1,000 mg at 04/15/25 0825    miconazole (MICATIN) 2 % powder   Topical BID Jd Jones MD   Given at 04/14/25 2024    OLANZapine (zyPREXA) tablet 15 mg  15 mg Oral At Bedtime Nissa Morris APRN CNP   15 mg at 04/14/25 2022    prazosin (MINIPRESS) capsule 2 mg  2 mg Oral At Bedtime Nissa Morris APRN CNP   2 mg at 04/14/25 2021    traZODone (DESYREL) tablet 300 mg  300 mg Oral At Bedtime Nissa Morris APRN CNP   300 mg at 04/14/25 2021          Allergies:   No Known Allergies       Labs:   No results found for this or any previous visit (from the past 24 hours).       Psychiatric Examination:     /86 (BP Location: Right arm, Patient Position: Sitting, Cuff Size: Adult Large)   Pulse 78   Temp 97.6  F (36.4  C) (Oral)   Resp 18   Ht 1.651 m (5' 5\")   Wt (!) 145.8 kg (321 lb 8 oz)   SpO2 95%   BMI 53.50 kg/m    Weight is 321 lbs 8 oz  Body mass index is 53.5 kg/m .    Orthostatic Vitals         Most Recent      Sitting Orthostatic /92 04/15 0841    Sitting Orthostatic Pulse (bpm) 74 04/15 0841    Standing Orthostatic /91 04/15 0841    Standing Orthostatic Pulse (bpm) 85 04/15 0841           Appearance: awake, alert, dressed in hospital scrubs, appeared as age stated, and morbidly obese  Attitude: minimally cooperative  Eye Contact: partial,   Mood: anxious and sad   Affect:  constricted mobility;    Speech:  soft, coherent and decreased prosody  Psychomotor Behavior:  no evidence of tardive dyskinesia, dystonia, or tics  Throught Process: linear, concrete  Associations:  no loose associations  Thought Content:  passive vs active suicidal ideation, Self injurious behavior with urges to cut self present (no recent Self injurious behavior, though) and endorses  auditory hallucinations of angels speaking to her, denies visual hallucinations. Today reports thoughts of buying  and harming herself " with it;  Insight:  limited,    Judgement:  limited  Oriented to:  time, person, and place  Attention Span and Concentration:  limited  Recent and Remote Memory:  fair    Clinical Global Impressions  First:7/4 4/4/2025     Most recent:            Precautions:     Behavioral Orders   Procedures    Code 1 - Restrict to Unit    Routine Programming     As clinically indicated    Self Injury Precaution    Status 15     Every 15 minutes.    Status Individual Observation     Patient SIO status reviewed with team/RN.  Please also refer to RN/team documentation for add'l detail. Female preferred. Patient tends to harm self in bathroom, keep bathroom door open.    -SIO staff to monitor following which have contributed to patient being on SIO:   *Self-injury by cutting.  On [facility] unit, she stabbed her wrist with a plastic fork.  She was taken off SIO on 4/11 and scratched herself with a straw on 4/12.  She has suicidal ideation and is unable to contract for safety.   *Pt also had coffee stir stick/straw hidden in sock for a couple days, pt gave to RN on 2/8. Please watch to ensure pt is not grabbing stir sticks (or other utensils).   -Possible interventions SIO staff could use to support patient's treatment progress:   *Ensure no access to potentially unsafe objects, including straws/stir sticks, utensils, other items that she may find on unit or in group.  Finger foods.   *Pt should not have hygiene items in her room. These should be stored in her locker and pt may use them when needed with SIO staff present. Afterward, they should be placed back in locker.   *Please do not give pt any additional items without checking with RN first.    *Offer coping skills.   -When following observed, team will review discontinuation of SIO:   Patient consistently maintains safe behavior and contracts for safety.     Order Specific Question:   CONTINUOUS 24 hours / day     Answer:   5 feet     Order Specific Question:   Indications for SIO      Answer:   Self-injury risk    Suicide precautions: Suicide Risk: HIGH; Clinical rationale to override score: Exhibiting Suicidal/self-harm behaviors or thoughts     Patients on Suicide Precautions should have a Combination Diet ordered that includes a Diet selection(s) AND a Behavioral Tray selection for Safe Tray - with utensils, or Safe Tray - NO utensils       Order Specific Question:   Suicide Risk     Answer:   HIGH     Order Specific Question:   Clinical rationale to override score:     Answer:   Exhibiting Suicidal/self-harm behaviors or thoughts          DIagnoses:     Borderline Personality Disorder  Major Depressive Disorder Recurrent with Psychosis         Plan:     Started Celexa 20 mg Daily and Micatin powder 2% for rash under the breast on 4/7/2025. Zyprexa was decreased on 4/3/2025. We increased Abilify on 4/14. Will increase Celexa dose on 4/15/2025. Will as per discussion above continue on SIO and NO roommate order with preferred female staff so that patient can be monitored in bathroom. Will continue to provide support and structure. [facility].         I was present with PA student who participated in the service and in the documentation of the note. I have verified the history and personally performed the physical exam and medical decision making. I agree with the assessment and plan of care as documented in the note.     Jd Jones MD  [facility]    Total time spent was 51 minutes. Over 50% of times was spent counseling and coordination of care regarding coping skills, medication and discharge planning/discussion of safety issues.

## 2025-04-15 NOTE — PLAN OF CARE
"Goal Outcome Evaluation:    Plan of Care Reviewed With: patient        Pt visible in the milieu and watched TV while keeping to self. Pt endorsed moderate anxiety and high depression and declined prn medications. Pt endorsed thought of self injury behavior and that she have a secret weapon and she will not tell staff where the weapon is. Pt room and bathroom search and a straw hiding in the toilet tissue found. Pt care plan updated for pt not to have a straw and med cups. Pt is currently having safety tray and on SIO 5 ft. Pt  refused commit for safety. Pt endorsed auditory hallucination and the voices are saying \"your are an anmol and it not yet time for you to  to become and anmol\". Pt denied visual hallucination and HI. Pt described mood as low and presented with flat affect, but calm and polite.   Adequate fluids and food intake, voiding freely and no BM concern per pt. Pt denied pain and shortness of breath and vital sign stable. /86 (BP Location: Right arm, Patient Position: Sitting, Cuff Size: Adult Large)   Pulse 78   Temp 97.9  F (36.6  C) (Oral)   Resp 18   Ht 1.651 m (5' 5\")   Wt (!) 145.4 kg (320 lb 9.6 oz)   SpO2 95%   BMI 53.35 kg/m               "

## 2025-04-15 NOTE — PROGRESS NOTES
NOC Shift Report  Pt continues to be on SIO for SIB, tolerates well.     Pt in bed at beginning of shift, breathing quiet and unlabored. Pt slept through shift. Pt slept 6.5 hours.     No pt complaints or concerns at this time.     No PRNs given. Will continue to monitor.

## 2025-04-15 NOTE — PLAN OF CARE
Rehab Group    Start time: 10:15  End time: 11:00  Patient time total: 30 minutes    attended partial group    #7 attended   Group Type: OT Clinic   Group Topic Covered: balanced lifestyle, coping skills, healthy leisure time, relaxation , and social skills       Group Session Detail:  Pt actively participated in occupational therapy clinic to facilitate coping skill exploration, creative expression within personally meaningful activities, and clinical observation of social, cognitive, and kinesthetic performance skills.     Patient Response/Contribution:  cooperative with task, organized, safe use of materials/supplies, attentive, actively engaged, engaged socially when prompted, and withdrawn     Patient Detail:   Pt response: Patient arrived to this group on time with one SIO staff member. Upon arrival, patient selected to complete a sun catcher window cling project following prompting from OT and SIO staff. Patient was independent to initiate, sequence, and adjust to workspace demands as needed after set up of materials was provided. Demonstrated good focus, planning, and problem solving for selected sun catcher coloring task. Able to ask for assistance as needed; however, patient engaged in minimal observed social interactions. When approached, patient provided appropriate, though brief responses with limited eye contact. However, patient did appear to enjoy listening to music with peers and offered a song suggestion on one occasion. Patient left group roughly fifteen minutes early to meet with a unit provider but assisted in gathering some supplies prior to leaving group. Affect appeared flat and withdrawn; however, patient remained calm and cooperative during this group.       [id number] OT Group (2 or more in attendance)      Patient Active Problem List   Diagnosis    Depression    Suicidal ideation    Depression with suicidal ideation    Major depression    Suicidal behavior    Facial cellulitis    Auditory  hallucination    Psychosis, unspecified psychosis type (H)    Morbid obesity (H)    Chronic insomnia    Generalized anxiety disorder    COVID-19    Borderline personality disorder (H)    Depression, unspecified depression type

## 2025-04-15 NOTE — PLAN OF CARE
BEHAVIORAL TEAM DISCUSSION    Participants: MD, CM, RN, OT  Progress:depressed, chronic suicidal ideation, confused, poor memory, doesn't interact with others  Anticipated length of stay: unknown  Continued Stay Criteria/Rationale: medication adjustment to stabilize, pursue IRTS  Medical/Physical: NA  Precautions:   Behavioral Orders   Procedures     Code 1 - Restrict to Unit     Fall precautions     Fall precautions     Robles Gordon     Routine Programming     As clinically indicated     Status 15     Every 15 minutes.     Plan: encourage groups, medication adjustment, pursue IRTS  Rationale for change in precautions or plan: NA         Female

## 2025-04-15 NOTE — PLAN OF CARE
Problem: Nonsuicidal Self-Injury  Goal: Improved Behavior Regulation and Impulse Control (Nonsuicidal Self-Injury)  Outcome: Progressing    Pt said she had strong urges to cut this morning. Says she hears voices of angels telling her she should cut. She also has the continued thoughts of wishing she would not wake up.  Said she cuts to relieve the pain. Said her thoughts of suicide are always to overdose. Said at her group home she would sneak pills when given her med's. She would then save them. She did this right before admission but did not act on it. Pt given Hydroxyzine, 50 mg, prn at 1120. Pt went to all groups this morning. Writer pointed out how she has not done this when writer has worked with her. She noted this and said she was actually proud of herself and smiled. This afternoon she has been napping in her room. Denies any health issues except a headache and she asked for a hot pack but declined any pain med's.

## 2025-04-15 NOTE — PLAN OF CARE
0BEH IP Unit Acuity Rating Score (UARS)  Patient is given one point for every criteria they meet.    CRITERIA SCORING   On a 72 hour hold, court hold, committed, stay of commitment, or revocation. 1    Patient LOS on BEH unit exceeds 20 days. 0  LOS: 12   Patient under guardianship, 55+, otherwise medically complex, or under age 11. 0   Suicide ideation without relief of precipitating factors. 0   Current plan for suicide. 0   Current plan for homicide. 0   Imminent risk or actual attempt to seriously harm another without relief of factors precipitating the attempt. 0   Severe dysfunction in daily living (ex: complete neglect for self care, extreme disruption in vegetative function, extreme deterioration in social interactions). 1   Recent (last 7 days) or current physical aggression in the ED or on unit. 0   Restraints or seclusion episode in past 72 hours. 0   Recent (last 7 days) or current verbal aggression, agitation, yelling, etc., while in the ED or unit. 0   Active psychosis. 0   Need for constant or near constant redirection (from leaving, from others, etc).  0   Intrusive or disruptive behaviors. 0   Patient requires 3 or more hours of individualized nursing care per 8-hour shift (i.e. for ADLs, meds, therapeutic interventions). 1   TOTAL 3

## 2025-04-16 PROCEDURE — 99232 SBSQ HOSP IP/OBS MODERATE 35: CPT | Performed by: STUDENT IN AN ORGANIZED HEALTH CARE EDUCATION/TRAINING PROGRAM

## 2025-04-16 PROCEDURE — 250N000013 HC RX MED GY IP 250 OP 250 PS 637: Performed by: STUDENT IN AN ORGANIZED HEALTH CARE EDUCATION/TRAINING PROGRAM

## 2025-04-16 PROCEDURE — 250N000013 HC RX MED GY IP 250 OP 250 PS 637: Performed by: NURSE PRACTITIONER

## 2025-04-16 PROCEDURE — 97150 GROUP THERAPEUTIC PROCEDURES: CPT | Mod: GO

## 2025-04-16 PROCEDURE — 250N000013 HC RX MED GY IP 250 OP 250 PS 637: Performed by: PSYCHIATRY & NEUROLOGY

## 2025-04-16 PROCEDURE — 90834 PSYTX W PT 45 MINUTES: CPT | Performed by: COUNSELOR

## 2025-04-16 PROCEDURE — 124N000002 HC R&B MH UMMC

## 2025-04-16 RX ORDER — TRAZODONE HYDROCHLORIDE 50 MG/1
50-100 TABLET ORAL
Status: DISCONTINUED | OUTPATIENT
Start: 2025-04-16 | End: 2025-04-24 | Stop reason: HOSPADM

## 2025-04-16 RX ADMIN — DOCUSATE SODIUM 100 MG: 100 CAPSULE, LIQUID FILLED ORAL at 19:10

## 2025-04-16 RX ADMIN — MICONAZOLE NITRATE: 20 POWDER TOPICAL at 08:18

## 2025-04-16 RX ADMIN — OLANZAPINE 10 MG: 10 TABLET, FILM COATED ORAL at 12:10

## 2025-04-16 RX ADMIN — LAMOTRIGINE 150 MG: 100 TABLET ORAL at 19:11

## 2025-04-16 RX ADMIN — OLANZAPINE 15 MG: 15 TABLET, FILM COATED ORAL at 21:14

## 2025-04-16 RX ADMIN — POLYETHYLENE GLYCOL 3350 17 G: 17 POWDER, FOR SOLUTION ORAL at 08:16

## 2025-04-16 RX ADMIN — METFORMIN HYDROCHLORIDE 1000 MG: 500 TABLET, FILM COATED ORAL at 17:51

## 2025-04-16 RX ADMIN — SENNOSIDES AND DOCUSATE SODIUM 1 TABLET: 50; 8.6 TABLET ORAL at 08:17

## 2025-04-16 RX ADMIN — PRAZOSIN HYDROCHLORIDE 3 MG: 2 CAPSULE ORAL at 21:14

## 2025-04-16 RX ADMIN — CITALOPRAM HYDROBROMIDE 30 MG: 10 TABLET ORAL at 08:16

## 2025-04-16 RX ADMIN — CLONAZEPAM 1 MG: 0.5 TABLET ORAL at 08:16

## 2025-04-16 RX ADMIN — DOCUSATE SODIUM 100 MG: 100 CAPSULE, LIQUID FILLED ORAL at 08:17

## 2025-04-16 RX ADMIN — Medication 12.5 MCG: at 08:17

## 2025-04-16 RX ADMIN — CLONAZEPAM 1 MG: 0.5 TABLET ORAL at 19:10

## 2025-04-16 RX ADMIN — METFORMIN HYDROCHLORIDE 1000 MG: 500 TABLET, FILM COATED ORAL at 08:16

## 2025-04-16 RX ADMIN — LAMOTRIGINE 150 MG: 100 TABLET ORAL at 08:17

## 2025-04-16 RX ADMIN — TRAZODONE HYDROCHLORIDE 300 MG: 150 TABLET ORAL at 21:14

## 2025-04-16 RX ADMIN — CLONAZEPAM 1 MG: 0.5 TABLET ORAL at 14:06

## 2025-04-16 RX ADMIN — MICONAZOLE NITRATE: 20 POWDER TOPICAL at 21:15

## 2025-04-16 RX ADMIN — Medication 15 MG: at 08:16

## 2025-04-16 ASSESSMENT — ACTIVITIES OF DAILY LIVING (ADL)
DRESS: INDEPENDENT
ADLS_ACUITY_SCORE: 46
ADLS_ACUITY_SCORE: 35
HYGIENE/GROOMING: INDEPENDENT
ADLS_ACUITY_SCORE: 35
ADLS_ACUITY_SCORE: 46
ORAL_HYGIENE: INDEPENDENT
ADLS_ACUITY_SCORE: 46
ADLS_ACUITY_SCORE: 35
ADLS_ACUITY_SCORE: 46
LAUNDRY: WITH SUPERVISION
ADLS_ACUITY_SCORE: 35
ADLS_ACUITY_SCORE: 46
ADLS_ACUITY_SCORE: 35
ADLS_ACUITY_SCORE: 35
ADLS_ACUITY_SCORE: 46
ADLS_ACUITY_SCORE: 35

## 2025-04-16 NOTE — PLAN OF CARE
0BEH IP Unit Acuity Rating Score (UARS)  Patient is given one point for every criteria they meet.    CRITERIA SCORING   On a 72 hour hold, court hold, committed, stay of commitment, or revocation. 1    Patient LOS on BEH unit exceeds 20 days. 0  LOS: 13   Patient under guardianship, 55+, otherwise medically complex, or under age 11. 0   Suicide ideation without relief of precipitating factors. 0   Current plan for suicide. 0   Current plan for homicide. 0   Imminent risk or actual attempt to seriously harm another without relief of factors precipitating the attempt. 0   Severe dysfunction in daily living (ex: complete neglect for self care, extreme disruption in vegetative function, extreme deterioration in social interactions). 1   Recent (last 7 days) or current physical aggression in the ED or on unit. 0   Restraints or seclusion episode in past 72 hours. 0   Recent (last 7 days) or current verbal aggression, agitation, yelling, etc., while in the ED or unit. 0   Active psychosis. 0   Need for constant or near constant redirection (from leaving, from others, etc).  0   Intrusive or disruptive behaviors. 0   Patient requires 3 or more hours of individualized nursing care per 8-hour shift (i.e. for ADLs, meds, therapeutic interventions). 1   TOTAL 3

## 2025-04-16 NOTE — PLAN OF CARE
"  Problem: Adult Behavioral Health Plan of Care  Goal: Plan of Care Review  Recent Flowsheet Documentation  Taken 4/16/2025 1000 by No Gaona RN  Patient Agreement with Plan of Care: agrees   Goal Outcome Evaluation:    Plan of Care Reviewed With: patient            Presentation: The patient continues on status individual observation 1:1 for suicidal thoughts with a plan.  The patient is observed adequately groomed and appropriately dressed in scrubs.  The patient is social with staff.  Her affect is flat and blunted.  Speech is clear and coherent.  Thoughts are preoccupied.  Mood is anxious and cooperative.      Mental health symptoms: The patient endorses auditory hallucinations that tell her to harm herself.  The patient has a history of SIB and has a plan. The patient continues to report anxiety and depression.  The patient is using coping skills including: distraction, positive thoughts, seeking staff to for assistance with thoughts of SIB.  The patient reports the skills are helpful and reduced the thoughts of SIB.  The patient meet 1:1 with the therapist.     Medication compliance:  The patient is compliant with her medications.        Medical Concerns: The patient denies pain.  Requested stool softner prn's.  Blood pressure 106/77, pulse 102, temperature 98  F (36.7  C), temperature source Oral, resp. rate 18, height 1.651 m (5' 5\"), weight (!) 145.8 kg (321 lb 8 oz), SpO2 97%, not currently breastfeeding.      PRN's: Zyprexa 10mg, senna, and miralax.      Precautions:    Continue with plan of care            "

## 2025-04-16 NOTE — PROGRESS NOTES
"[facility], [place]   Psychiatric Progress Note        Interim History:     The patient's care was discussed with the treatment team during the daily team meeting and/or staff's chart notes were reviewed.      Sleep: 6 hours (04/16/25 0600)  Scheduled Medications: took all scheduled medications as prescribed   PRN medications: no psychiatric PRNs given     Met with Misty in the conference room. She says her mood is \"up and down\". She has had urges to harm herself mostly because she feels like she deserves pain. She has a lot of thoughts about wanting to die and thinks about overdosing on medications. She has a lot of negative self-talk and feels that she is not worth being alive. The voices she hears also say the same things. She does feel like olanzapine is helpful for the voices and the urge to self harm. She is having some difficulty sleeping--she typically feels tired around 8-9 and goes to bed then wakes up around 2am and rolls around in bed for an hour or two before falling back asleep. She still has occasional nightmares. She is open to increasing prazosin today and understands to watch out for increased lightheadedness.          Medications:     Current Facility-Administered Medications   Medication Dose Route Frequency Provider Last Rate Last Admin    ARIPiprazole (ABILIFY) half-tab 15 mg  15 mg Oral Daily Jd Jones MD   15 mg at 04/16/25 0816    citalopram (celeXA) tablet 30 mg  30 mg Oral Daily Jd Jones MD   30 mg at 04/16/25 0816    clonazePAM (klonoPIN) tablet 1 mg  1 mg Oral TID Nissa Morris APRN CNP   1 mg at 04/16/25 0816    docusate sodium (COLACE) capsule 100 mg  100 mg Oral BID Nissa Morris APRN CNP   100 mg at 04/16/25 0817    lamoTRIgine (LaMICtal) tablet 150 mg  150 mg Oral BID Nissa Morris APRN CNP   150 mg at 04/16/25 0817    levothyroxine (SYNTHROID/LEVOTHROID) half-tab 12.5 mcg  12.5 mcg Oral QAM AC " "Nissa Morris APRN CNP   12.5 mcg at 04/16/25 0817    metFORMIN (GLUCOPHAGE) tablet 1,000 mg  1,000 mg Oral BID w/meals Nissa Morris APRN CNP   1,000 mg at 04/16/25 0816    miconazole (MICATIN) 2 % powder   Topical BID Jd Jones MD   Given at 04/16/25 0818    OLANZapine (zyPREXA) tablet 15 mg  15 mg Oral At Bedtime Nissa Morris APRN CNP   15 mg at 04/15/25 2200    prazosin (MINIPRESS) capsule 2 mg  2 mg Oral At Bedtime Nissa Morris APRN CNP   2 mg at 04/15/25 2157    traZODone (DESYREL) tablet 300 mg  300 mg Oral At Bedtime Nissa Morris APRN CNP   300 mg at 04/15/25 2157          Allergies:   No Known Allergies       Labs:   No results found for this or any previous visit (from the past 24 hours).       Psychiatric Examination:     /77 (BP Location: Right arm, Patient Position: Sitting)   Pulse 102   Temp 98  F (36.7  C) (Oral)   Resp 18   Ht 1.651 m (5' 5\")   Wt (!) 145.8 kg (321 lb 8 oz)   SpO2 97%   BMI 53.50 kg/m    Weight is 321 lbs 8 oz  Body mass index is 53.5 kg/m .    Orthostatic Vitals         Most Recent      Sitting Orthostatic /87 04/10 0900    Standing Orthostatic /95 04/10 0900    Standing Orthostatic Pulse (bpm) 97 04/10 0900           Appearance: awake, alert, dressed in hospital scrubs, appeared as age stated, and morbidly obese  Attitude: somewhat more cooperative  Eye Contact: partial, better  Mood: anxious and sad   Affect:  constricted mobility;  flat , blunted  Speech:  soft, coherent and decreased prosody  Psychomotor Behavior:  no evidence of tardive dyskinesia, dystonia, or tics  Throught Process: linear, concrete  Associations:  no loose associations  Thought Content:  passive suicidal ideation, Self injurious behavior with urges to cut self present (no recent Self injurious behavior for a few days now) and endorses  auditory hallucinations of angels speaking to her, denies visual " hallucinations  Insight:  limited, but improving  Judgement:  limited  Oriented to:  time, person, and place  Attention Span and Concentration:  limited  Recent and Remote Memory:  fair         Precautions:     Behavioral Orders   Procedures    Code 1 - Restrict to Unit    Routine Programming     As clinically indicated    Self Injury Precaution    Status 15     Every 15 minutes.    Status Individual Observation     Patient SIO status reviewed with team/RN.  Please also refer to RN/team documentation for add'l detail. Female preferred. Patient tends to harm self in bathroom, keep bathroom door open.    -SIO staff to monitor following which have contributed to patient being on SIO:   *Self-injury by cutting.  On [facility] unit, she stabbed her wrist with a plastic fork.  She was taken off SIO on 4/11 and scratched herself with a straw on 4/12.  She has suicidal ideation and is unable to contract for safety.   *Pt also had coffee stir stick/straw hidden in sock for a couple days, pt gave to RN on 2/8. Please watch to ensure pt is not grabbing stir sticks (or other utensils).   -Possible interventions SIO staff could use to support patient's treatment progress:   *Ensure no access to potentially unsafe objects, including straws/stir sticks, utensils, other items that she may find on unit or in group.  Finger foods.   *Pt should not have hygiene items in her room. These should be stored in her locker and pt may use them when needed with SIO staff present. Afterward, they should be placed back in locker.   *Please do not give pt any additional items without checking with RN first.    *Offer coping skills.   -When following observed, team will review discontinuation of SIO:   Patient consistently maintains safe behavior and contracts for safety.     Order Specific Question:   CONTINUOUS 24 hours / day     Answer:   5 feet     Order Specific Question:   Indications for SIO     Answer:   Self-injury risk    Suicide  precautions: Suicide Risk: HIGH; Clinical rationale to override score: Exhibiting Suicidal/self-harm behaviors or thoughts     Patients on Suicide Precautions should have a Combination Diet ordered that includes a Diet selection(s) AND a Behavioral Tray selection for Safe Tray - with utensils, or Safe Tray - NO utensils       Order Specific Question:   Suicide Risk     Answer:   HIGH     Order Specific Question:   Clinical rationale to override score:     Answer:   Exhibiting Suicidal/self-harm behaviors or thoughts              Diagnoses:     Borderline Personality Disorder  Major Depressive Disorder Recurrent with Psychosis         Plan:     - Citalopram 30mg daily  - Lamotrigine 150mg BID  - Aripiprazole 15mg daily  - Olanzapine 15mg at bedtime  - Prazosin: increase from 2mg to 3mg qhs  - Trazodone 300mg at bedtime    Orders Placed This Encounter      Legal status Patient is Committed    Will continue to provide support and structure. Plan is to return to the [facility] at [facility].        Mary Lou Torrez MD  [facility] Psychiatry

## 2025-04-16 NOTE — PROGRESS NOTES
"  Rehab Group    Start time: 1700  End time: 1800  Patient time total: 45 minutes    attended partial group    #4 attended   Group Type: art   Group Topic Covered: activity therapy       Group Session Detail:  Art Therapy directive is to create an inside/outside drawing expressing aspects of self/identify.  Goals of directive: to create a visual self narrative, to express aspects of self, to identify personal strengths and goals, emotional expression.        Patient Response/Contribution:  cooperative with task       Patient Detail:    Pt was a quiet, engaged participant, presented with depressed affect, withdrawn. Pt finished artwork and briefly verbally processed with author and group. Pt created a picture of her brain within figure outline, drawing one side black and one side yellow. Pt described this imagery as her thoughts and \"moving back in forth between negative thoughts and moments of calm/feeling happy.\"       Activity Therapy Per 15 min ()      Patient Active Problem List   Diagnosis    Depression    Suicidal ideation    Depression with suicidal ideation    Major depression    Suicidal behavior    Facial cellulitis    Auditory hallucination    Psychosis, unspecified psychosis type (H)    Morbid obesity (H)    Chronic insomnia    Generalized anxiety disorder    COVID-19    Borderline personality disorder (H)    Depression, unspecified depression type      "

## 2025-04-16 NOTE — PLAN OF CARE
Problem: Sleep Disturbance  Goal: Adequate Sleep/Rest  Outcome: Progressing  Intervention: Promote Sleep/Rest  Recent Flowsheet Documentation  Taken 4/16/2025 0140 by Marissa Barber RN  Sleep/Rest Enhancement:   comfort measures   noise level reduced   Goal Outcome Evaluation:         Pt received in bed sleeping breathing quietly and unlabored. Awake at 0330, came out for some ice water and returned to her room. Pt slept 6.0 hours as observed during regular safety checks. No safety or behavioral concerns.

## 2025-04-16 NOTE — PLAN OF CARE
"Problem: Suicidal Behavior  Goal: Suicidal Behavior is Absent or Managed  Outcome: Progressing     Problem: Psychotic Signs/Symptoms  Goal: Improved Behavioral Control (Psychotic Signs/Symptoms)  Outcome: Progressing   Goal Outcome Evaluation:    Plan of Care Reviewed With: patient  Patient observed intermittently out in the milieu. She spent most of her time in her room resting in bed. Encouraged to join groups which she did. Presented with blunted affect, guarded. She was cooperative upon approach. Mood was anxious and depressed. She claimed having anxiety and depression, rated both at 6/10. Patient claimed still experiencing auditory hallucinations. She stated \"I hear voices, angels, telling me it's time to end my life and be an anmol\". She further claimed having urges to cut herself and she feels \"up and down\" but stated that the PRN Zyprexa given during change of shift was helpful. She did not commit to safety. Encouraged patient to use her coping skills and to let staff know right away if having a hard time. Patient was accepting. She claimed she has been going to groups, listening to music, uses her rubber band on her arm to cope. She claimed her short term goal is \"not to cut\" and her long term goal is \"make my positive thoughts overcome the negative ones\".     Patient was medication compliant. She denies experiencing side effects. Patient continues to be on SIO, 5 ft for SIB and unable to contract for safety. No roommate order in place. No behavioral concerns this shift    Medical Concerns: patient denies pain.   Appetite: Consumed 100% of share of dinner and took approximately 500 ml of fluids.   Sleep: patient claimed she wakes up at 2 AM almost every day.   LBM: patient claimed she had a small bowel movement today. Encouraged to increase fluid intake. Patient verbalized understanding.   ADLs: Independent.   PRNs given: No PRN medications given this shift.     Needs attended to. No further concerns noted " as of this time.

## 2025-04-16 NOTE — PLAN OF CARE
"  Problem: Adult Behavioral Health Plan of Care  Goal: Plan of Care Review  Outcome: Progressing  Flowsheets  Taken 4/16/2025 1849  Plan of Care Reviewed With: patient  Patient Agreement with Plan of Care: agrees  Taken 4/16/2025 1641  Patient Agreement with Plan of Care: agrees  Goal: Adheres to Safety Considerations for Self and Others  Outcome: Progressing  Intervention: Develop and Maintain Individualized Safety Plan  Recent Flowsheet Documentation  Taken 4/16/2025 1641 by Alejandra Mueller RN  Safety Measures:   1:1 observation maintained   safety plan reviewed  Goal: Optimized Coping Skills in Response to Life Stressors  Outcome: Progressing  Intervention: Promote Effective Coping Strategies  Recent Flowsheet Documentation  Taken 4/16/2025 1813 by Alejandra Mueller RN  Supportive Measures:   positive reinforcement provided   decision-making supported  Taken 4/16/2025 1641 by Alejandra Mueller RN  Supportive Measures:   active listening utilized   decision-making supported   goal-setting facilitated   self-responsibility promoted   verbalization of feelings encouraged     Problem: Suicidal Behavior  Goal: Suicidal Behavior is Absent or Managed  Outcome: Progressing  Intervention: Provide Immediate and Ongoing Protective Physical Environment  Recent Flowsheet Documentation  Taken 4/16/2025 1813 by Alejandra Mueller RN  Safe Transition Promotion: protective factors promoted   Goal Outcome Evaluation:    Plan of Care Reviewed With: patient Plan of Care Reviewed With: patient      Pt continues to be on SIO 1:1 for safety.  Pt has blunt and flat affect, socializes with staff. Pt repots SIB and SI thoughts without a plan, verbalizes that PRN administered during morning shift were helpful, pt states that she hears voices telling her \" come be the Baljit.\" Then says \" I think they are telling me to just close my eyes and not wake up.. pt reports anxiety and depression-2/10, denies pain, and contracts for safety.  Pt " ate at the lounge with Intake of 100%.  Pt is med compliant, no PRN administered.

## 2025-04-16 NOTE — PLAN OF CARE
Team Note Due:  Thursday    Assessment/Intervention/Current Symtoms and Care Coordination:  Chart review and met with team, discussed pt progress, symptomology, and response to treatment.  Discussed the discharge plan and any potential impediments to discharge.      Per Team:OT offers a tool for intervening on the patient's urge to scratch self.           Discharge Plan or Goal:  Return to the [facility] at [facility].      Barriers to Discharge:  Too symptomatic     Referral Status:  Insurance  [facility] and Medicare      Has all established providers      Primary Care Provider:  Name/Clinic:      Number:         Medication Management/Psychiatry:  [facility]  Mental Health Clinic   [address]   [place] [address]   [phone]  Betty Cazares, WILLIAN, CNP   [address] [place] [address]   [phone] (Work)   [phone] (Fax)          Therapist:   Name/Clinic: Monse Hannah, LICSW               DBT Program  [facility]  3 days a week        /ACT Team:  Name/Clinic: Marleny Núñez  [facility]   Number: [phone]  Fax: [phone]  [email or web address].        [facility]   [facility]  [facility]  Assisted Living     [facility]  Name/Clinic: [facility]  Avril Roberto is the supervisor  Number: .[phone]  Pt lives at:  [address] [place] [address]     Legal Status:  There is no [place].  recommitted - has signed a waiver - waiting for the new court documents   4/10/25 - C  [facility]  File Number: New recommittment is   Start April 9, 2025  and   expiration date of May 1, 2026      Contacts (include GEORGI status):          Upcoming Meetings and Dates/Important Information and next steps:      Coordinate transfer back to [facility] home.    Needs provisional discharge at time of discharge.

## 2025-04-16 NOTE — PLAN OF CARE
"  Rehab Group    Start time: 10:15  End time: 11:00  Patient time total: 45 minutes    attended full group    #6 attended   Group Type: OT Clinic   Group Topic Covered: balanced lifestyle, coping skills, healthy leisure time, relaxation , and social skills       Group Session Detail:  Pt actively participated in occupational therapy clinic to facilitate coping skill exploration, creative expression within personally meaningful activities, and clinical observation of social, cognitive, and kinesthetic performance skills.      Patient Response/Contribution:  cooperative with task, organized, safe use of materials/supplies, attentive, actively engaged, and engaged socially when prompted, though somewhat withdrawn      Patient Detail:  Pt response: Patient arrived to this group on time with one O staff and independently selected to continue work on a previously started sun catcher decorating task. Patient stated \"pretty good\" when asked how she is doing today. Patient was independent to initiate, sequence, and adjust to workspace demands as needed after set up of materials was provided. Demonstrated good focus, planning, and problem solving for selected sun catcher coloring task. Able to ask for assistance as needed. Patient provided appropriate responses when prompted; however, responses were often brief. Patient did; however, offer multiple song suggestions for peers and appeared to enjoy listening to music with group members. Patient independently initiated and completed clean up of task materials at the end of group. Affect appeared flat; however, brightened somewhat during interactions. Patient remained calm and cooperative during this group.       [id number] OT Group (2 or more in attendance)      Patient Active Problem List   Diagnosis    Depression    Suicidal ideation    Depression with suicidal ideation    Major depression    Suicidal behavior    Facial cellulitis    Auditory hallucination    Psychosis, " unspecified psychosis type (H)    Morbid obesity (H)    Chronic insomnia    Generalized anxiety disorder    COVID-19    Borderline personality disorder (H)    Depression, unspecified depression type

## 2025-04-16 NOTE — PLAN OF CARE
"Goal Outcome Evaluation:    Individual Therapy Note      Date of Service: April 16, 2025    Patient: Misty goes by \"Msity,\" uses she/her pronouns    Individuals Present: Patricio Fernandezrosa [facility]    Session start: 11:30 am  Session end: 12:10 pm  Session duration in minutes: 40      Modality Used: DBT and Person Centered    Goals: Self Harm reduction, skills, self esteem, financial boundaries with sons and ex     Patient Description of current symptoms: \" feel like I deserve the pain\" voices are less telling her to harm herself, talking to staff and validation helps     Mental Status Exam:   Attitude: cooperative  Eye Contact: fair  Mood: sad  and depressed  Affect: intensity is blunted and intensity is flat  Speech: clear, coherent  Psychomotor Behavior: no evidence of tardive dyskinesia, dystonia, or tics  Thought Process:  linear and illogical  Associations: loosening of associations present  Thought Content: passive suicidal ideation present  Insight: fair  Judgement: limited  Attention Span and Concentration: intact    Pt progress: Pt opens up and shares in these sessions. She is responsive to learning TIPP skills  and working with self esteem and boundaries as it relates to financial worries and loaning or giving money she doesn't have excess of to sons and ex .    Treatment Objective(s) Addressed:   The focus of this session was on building distress tolerance, assessing safety, building self-esteem, and building skills in emotional regulation, expression of feelings in a healthy way      Progress Towards Goals and Assessment of Patient:   Patient is making progress towards treatment goals as evidenced by willingness to engage in groups and 1:1 therapy.       Therapeutic Intervention(s):   Provided active listening, unconditional positive regard, and validation.   Explored motivation for behavioral change, Discussed TIPP (body temperature, intense exercise, PMR), Identified stress relief " practices, Explored strategies for self-soothing, Introduced and practiced Wise Mind Introduced and practiced urge surfing, Introduced and explored accumulating positives, Discussed and practiced mindfulness, and boundary setting      Safety Plan: reviewed and updated with patient    Plan/next step: check in again next week    79900 - Psychotherapy (with patient) - 45 (38-52*) min    Patient Active Problem List   Diagnosis    Depression    Suicidal ideation    Depression with suicidal ideation    Major depression    Suicidal behavior    Facial cellulitis    Auditory hallucination    Psychosis, unspecified psychosis type (H)    Morbid obesity (H)    Chronic insomnia    Generalized anxiety disorder    COVID-19    Borderline personality disorder (H)    Depression, unspecified depression type

## 2025-04-17 VITALS
HEIGHT: 65 IN | BODY MASS INDEX: 48.82 KG/M2 | TEMPERATURE: 98.4 F | OXYGEN SATURATION: 97 % | RESPIRATION RATE: 18 BRPM | DIASTOLIC BLOOD PRESSURE: 89 MMHG | SYSTOLIC BLOOD PRESSURE: 139 MMHG | WEIGHT: 293 LBS | HEART RATE: 87 BPM

## 2025-04-17 PROCEDURE — 250N000013 HC RX MED GY IP 250 OP 250 PS 637: Performed by: STUDENT IN AN ORGANIZED HEALTH CARE EDUCATION/TRAINING PROGRAM

## 2025-04-17 PROCEDURE — 97150 GROUP THERAPEUTIC PROCEDURES: CPT | Mod: GO

## 2025-04-17 PROCEDURE — 99232 SBSQ HOSP IP/OBS MODERATE 35: CPT | Performed by: STUDENT IN AN ORGANIZED HEALTH CARE EDUCATION/TRAINING PROGRAM

## 2025-04-17 PROCEDURE — 250N000013 HC RX MED GY IP 250 OP 250 PS 637: Performed by: NURSE PRACTITIONER

## 2025-04-17 PROCEDURE — 250N000013 HC RX MED GY IP 250 OP 250 PS 637: Performed by: PSYCHIATRY & NEUROLOGY

## 2025-04-17 PROCEDURE — 124N000002 HC R&B MH UMMC

## 2025-04-17 RX ADMIN — CLONAZEPAM 1 MG: 0.5 TABLET ORAL at 14:09

## 2025-04-17 RX ADMIN — CLONAZEPAM 1 MG: 0.5 TABLET ORAL at 19:00

## 2025-04-17 RX ADMIN — POLYETHYLENE GLYCOL 3350 17 G: 17 POWDER, FOR SOLUTION ORAL at 08:33

## 2025-04-17 RX ADMIN — LAMOTRIGINE 150 MG: 100 TABLET ORAL at 19:00

## 2025-04-17 RX ADMIN — MICONAZOLE NITRATE: 20 POWDER TOPICAL at 20:28

## 2025-04-17 RX ADMIN — DOCUSATE SODIUM 100 MG: 100 CAPSULE, LIQUID FILLED ORAL at 20:07

## 2025-04-17 RX ADMIN — TRAZODONE HYDROCHLORIDE 300 MG: 150 TABLET ORAL at 20:06

## 2025-04-17 RX ADMIN — CLONAZEPAM 1 MG: 0.5 TABLET ORAL at 08:32

## 2025-04-17 RX ADMIN — METFORMIN HYDROCHLORIDE 1000 MG: 500 TABLET, FILM COATED ORAL at 17:00

## 2025-04-17 RX ADMIN — HYDROXYZINE HYDROCHLORIDE 50 MG: 50 TABLET, FILM COATED ORAL at 20:07

## 2025-04-17 RX ADMIN — PRAZOSIN HYDROCHLORIDE 3 MG: 2 CAPSULE ORAL at 20:06

## 2025-04-17 RX ADMIN — METFORMIN HYDROCHLORIDE 1000 MG: 500 TABLET, FILM COATED ORAL at 08:33

## 2025-04-17 RX ADMIN — CITALOPRAM HYDROBROMIDE 30 MG: 10 TABLET ORAL at 08:33

## 2025-04-17 RX ADMIN — DOCUSATE SODIUM 100 MG: 100 CAPSULE, LIQUID FILLED ORAL at 08:33

## 2025-04-17 RX ADMIN — Medication 15 MG: at 08:32

## 2025-04-17 RX ADMIN — OLANZAPINE 15 MG: 15 TABLET, FILM COATED ORAL at 19:00

## 2025-04-17 RX ADMIN — SENNOSIDES AND DOCUSATE SODIUM 1 TABLET: 50; 8.6 TABLET ORAL at 08:33

## 2025-04-17 RX ADMIN — Medication 12.5 MCG: at 08:33

## 2025-04-17 RX ADMIN — OLANZAPINE 10 MG: 10 TABLET, FILM COATED ORAL at 12:26

## 2025-04-17 RX ADMIN — LAMOTRIGINE 150 MG: 100 TABLET ORAL at 08:32

## 2025-04-17 ASSESSMENT — ACTIVITIES OF DAILY LIVING (ADL)
ADLS_ACUITY_SCORE: 35
ORAL_HYGIENE: INDEPENDENT
ADLS_ACUITY_SCORE: 35
LAUNDRY: WITH SUPERVISION
ADLS_ACUITY_SCORE: 35
ORAL_HYGIENE: INDEPENDENT
ADLS_ACUITY_SCORE: 35
DRESS: INDEPENDENT;SCRUBS (BEHAVIORAL HEALTH)
HYGIENE/GROOMING: INDEPENDENT
ADLS_ACUITY_SCORE: 35
DRESS: INDEPENDENT
ADLS_ACUITY_SCORE: 35
HYGIENE/GROOMING: INDEPENDENT
ADLS_ACUITY_SCORE: 35

## 2025-04-17 NOTE — PLAN OF CARE
"  Rehab Group    Start time: 10:15  End time: 11:00  Patient time total: 30 minutes    attended partial group    #6 attended   Group Type: OT Clinic   Group Topic Covered: balanced lifestyle, coping skills, healthy leisure time, relaxation , and social skills       Group Session Detail:  Pt actively participated in occupational therapy clinic to facilitate coping skill exploration, creative expression within personally meaningful activities, and clinical observation of social, cognitive, and kinesthetic performance skills.     Patient Response/Contribution:  cooperative with task, organized, safe use of materials/supplies, attentive, actively engaged, and engaged socially when prompted     Patient Detail:  Pt response: Patient arrived to this group on time with one [facility] staff and independently selected to continue work on a previously started sun catcher decorating task. Upon approach, patient expressed feeling \"pretty good\" today. Patient was independent to initiate, gather materials, sequence, and adjust to workspace demands as needed. Demonstrated good focus, planning, and problem solving for selected sun catcher task. Able to ask for assistance as needed, and socialized appropriately with staff when prompted, though responses were often rather brief. However, patient did note that a soft-bristled brush that OT had provided her with the day prior had been helpful in managing SIB urges. Patient engaged in minimal observed social interactions with peers. Patient left group roughly fifteen minutes early to meet with a unit provider. However, patient returned at the very end of group and independently cleaned up task supplies prior to leaving the group room. Affect appeared flat/blunted; however, patient remained calm and cooperative during this group.       [id number] OT Group (2 or more in attendance)      Patient Active Problem List   Diagnosis    Depression    Suicidal ideation    Depression with suicidal ideation    " Major depression    Suicidal behavior    Facial cellulitis    Auditory hallucination    Psychosis, unspecified psychosis type (H)    Morbid obesity (H)    Chronic insomnia    Generalized anxiety disorder    COVID-19    Borderline personality disorder (H)    Depression, unspecified depression type

## 2025-04-17 NOTE — PROGRESS NOTES
"[facility], [place]   Psychiatric Progress Note        Interim History:     The patient's care was discussed with the treatment team during the daily team meeting and/or staff's chart notes were reviewed.      Sleep: 6 hours (04/17/25 0606)  Scheduled Medications: took all scheduled medications as prescribed   PRN medications: no psychiatric PRNs given     Misty says she is feeling better today. She was talking with staff members and got some good advice about how to cope with stress. This is helpful for her SI. She says that it feels like she has a \"negative side\" and a \"positive side\". The negative side says mean things about her and encourages her to hurt herself. It's hard for her positive side to fight against this and we discussed strategies to strengthen positive thoughts with positive affiermations. She slept about the same last night and woke up a few times but eventually fell back asleep. She didn't notice any increased lightheadedness with the higher dose of prazosin. She is open to trying to go without an SIO. She doesn't feel ready to discharge from the hospital yet but thinks that she can work towards curtailing her urges to harm herself.         Medications:     Current Facility-Administered Medications   Medication Dose Route Frequency Provider Last Rate Last Admin    ARIPiprazole (ABILIFY) half-tab 15 mg  15 mg Oral Daily Jd Jones MD   15 mg at 04/17/25 0832    citalopram (celeXA) tablet 30 mg  30 mg Oral Daily Jd Jones MD   30 mg at 04/17/25 0833    clonazePAM (klonoPIN) tablet 1 mg  1 mg Oral TID Nissa Morris APRN CNP   1 mg at 04/17/25 0832    docusate sodium (COLACE) capsule 100 mg  100 mg Oral BID Nissa Morris APRN CNP   100 mg at 04/17/25 0833    lamoTRIgine (LaMICtal) tablet 150 mg  150 mg Oral BID Nissa Morris APRN CNP   150 mg at 04/17/25 0832    levothyroxine (SYNTHROID/LEVOTHROID) half-tab 12.5 mcg  " "12.5 mcg Oral QAM AC Nissa Morris APRN CNP   12.5 mcg at 04/17/25 0833    metFORMIN (GLUCOPHAGE) tablet 1,000 mg  1,000 mg Oral BID w/meals Nissa Morris APRN CNP   1,000 mg at 04/17/25 0833    miconazole (MICATIN) 2 % powder   Topical BID Jd Jones MD   Given at 04/16/25 2115    OLANZapine (zyPREXA) tablet 15 mg  15 mg Oral At Bedtime Nissa Morris APRN CNP   15 mg at 04/16/25 2114    prazosin (MINIPRESS) capsule 3 mg  3 mg Oral At Bedtime Mary Lou Torrez MD   3 mg at 04/16/25 2114    traZODone (DESYREL) tablet 300 mg  300 mg Oral At Bedtime Nissa Morris APRN CNP   300 mg at 04/16/25 2114          Allergies:   No Known Allergies       Labs:   No results found for this or any previous visit (from the past 24 hours).       Psychiatric Examination:     /84   Pulse 78   Temp 97.7  F (36.5  C) (Oral)   Resp 18   Ht 1.651 m (5' 5\")   Wt (!) 146 kg (321 lb 12.8 oz)   SpO2 96%   BMI 53.55 kg/m    Weight is 321 lbs 12.8 oz  Body mass index is 53.55 kg/m .    Appearance: awake, alert, dressed in hospital scrubs, appeared as age stated, and morbidly obese  Attitude: somewhat more cooperative  Eye Contact: partial, better  Mood: anxious and sad   Affect:  constricted mobility;  flat , blunted  Speech:  soft, coherent and decreased prosody  Psychomotor Behavior:  no evidence of tardive dyskinesia, dystonia, or tics  Throught Process: linear, concrete  Associations:  no loose associations  Thought Content:  passive suicidal ideation, Self injurious behavior with urges to cut self present (no recent Self injurious behavior for a few days now) and endorses  auditory hallucinations of angels speaking to her, denies visual hallucinations  Insight:  limited, but improving  Judgement:  limited  Oriented to:  time, person, and place  Attention Span and Concentration:  limited  Recent and Remote Memory:  fair         Precautions:     Behavioral Orders   Procedures    " Code 1 - Restrict to Unit    Routine Programming     As clinically indicated    Self Injury Precaution    Status 15     Every 15 minutes.    Suicide precautions: Suicide Risk: HIGH; Clinical rationale to override score: Exhibiting Suicidal/self-harm behaviors or thoughts     Patients on Suicide Precautions should have a Combination Diet ordered that includes a Diet selection(s) AND a Behavioral Tray selection for Safe Tray - with utensils, or Safe Tray - NO utensils       Order Specific Question:   Suicide Risk     Answer:   HIGH     Order Specific Question:   Clinical rationale to override score:     Answer:   Exhibiting Suicidal/self-harm behaviors or thoughts              Diagnoses:     Borderline Personality Disorder  Major Depressive Disorder Recurrent with Psychosis         Plan:     - Citalopram 30mg daily  - Lamotrigine 150mg BID  - Aripiprazole 15mg daily  - Olanzapine 15mg at bedtime  - Prazosin 3mg qhs  - Trazodone 300mg at bedtime    Orders Placed This Encounter      Legal status Patient is Committed    Will continue to provide support and structure. Plan is to return to the Community [facility]idential Services at [facility].        Mary Lou Torrez MD  [facility] Psychiatry

## 2025-04-17 NOTE — PLAN OF CARE
Team Note Due:  Thursday    Assessment/Intervention/Current Symtoms and Care Coordination:  Chart review and met with team, discussed pt progress, symptomology, and response to treatment.  Discussed the discharge plan and any potential impediments to discharge.      Per Team:  PT is using a tool for intervening on urges to scratch self.  Pt will be assessed for discontinuing SIO.       SIO was discontinued.      Discharge Plan or Goal:  Return to the Community Residential Services at [facility].      Barriers to Discharge:  Too symptomatic     Referral Status:  Insurance  [facility] and Medicare      Has all established providers      Primary Care Provider:  Name/Clinic:      Number:         Medication Management/Psychiatry:  [facility]  Mental Health Clinic   [address] [address]   [phone]  Betty Cazares, WILLIAN, CNP   [address] [place] [address]   [phone] (Work)   [phone] (Fax)          Therapist:   Name/Clinic: Monse Hannah, LICSW               DBT Program  [facility]  3 days a week        /ACT Team:  Name/Clinic: Marleny Núñez  [facility]   Number: [phone]  Fax: [phone]  [email or web address]        CADI   [facility]  [facility]  Assisted Living     Community Residential Services  Name/Clinic: [facility]  Avril Roberto is the supervisor  Number: .[phone]  Pt lives at:  [address] [place], [place] [address]     Legal Status:  There is no [place].  recommitted - has signed a waiver - waiting for the new court documents   4/10/25 - C  [facility] [place], [place]  File Number: New recommittment is   Start April 9, 2025  and   expiration date of May 1, 2026      Contacts (include GEORGI status):          Upcoming Meetings and Dates/Important Information and next steps:      Coordinate transfer back to [facility].    Needs  provisional discharge at time of discharge.

## 2025-04-17 NOTE — PLAN OF CARE
Problem: Suicidal Behavior  Goal: Suicidal Behavior is Absent or Managed  Outcome: Progressing   Goal Outcome Evaluation:    Night Shift Summary (4/16/25 into 04/17/25)    Pt asleep at start of shift. Breathing unlabored when sleeping. Pt had no c/o pain or discomfort during the HS. Remained in room for the entire shift.     Appears to have slept 6.25 hours.     Pt continues on 1:1 5ft SIO.

## 2025-04-17 NOTE — PLAN OF CARE
04/17/25 1510   Individualization/Patient Specific Goals   Patient Personal Strengths expressive of needs;no history of violence   Patient Vulnerabilities history of unsuccessful treatment   Anxieties, Fears or Concerns pt continues to have SIB urges   Individualized Care Needs therapy - individual and group   Patient/Family-Specific Goals (Include Timeframe) Stabilze and return to her CRS facility   Interprofessional Rounds   Summary Pt was brought in by DBT therapy program due to inability to stop urges to self injurious behavior.   Participants CTC;nursing;OT;other (see comments)   Behavioral Team Discussion   Participants Mary Lou Torrez MD, Marietta WHITMORE, myron Gaona RN,   Dorita Luna OTR,   Progress Pt was taken off SIO, could contract for safety, using a thistle brush as intervention for scratching   Anticipated length of stay 1 week   Continued Stay Criteria/Rationale The pt needs a few nore days of stabilization before returning home   Medical/Physical no issues discussed   Precautions SI, SIB   Plan Discharge back to CRS home   Anticipated Discharge Disposition other (see comments)     Goal Outcome Evaluation:

## 2025-04-17 NOTE — PLAN OF CARE
"  Problem: Adult Behavioral Health Plan of Care  Goal: Plan of Care Review  Recent Flowsheet Documentation  Taken 4/17/2025 0900 by No Gaona RN  Patient Agreement with Plan of Care: agrees   Goal Outcome Evaluation:    Plan of Care Reviewed With: patient                 Goal for this shift: \"To take it moment by moment\" said the patient.             Presentation: The patient is observed in the lounge appropriately dressed in scrubs.  The patient has a flat and relaxed affect. Speech is clear and coherent. Thoughts are distracted and mood is cooperative.  The patient is observed sitting on her bed, engage in a conversation with her 1:1 staff, and her leg is continuously shaking.  The patient reports a decrease in thoughts of SI and SIB.  The shared with this writer her mental health history began in middle school.  She shared with the writer.  She was in special ed.  The kids teased her and called her dumb.  During activities where the students had to pair together.  Her peers would tease the student who was assigned to her by the teacher.  The patient reported she started cutting and would wear sweaters during the summer time.  She stated, that no adult asked her if she was okay or why she was wearing a sweater during the summer.  This writer encouraged the patient to use positive thinking skills.  Skills that encourage self care including: acknowledging that she is able to protect herself from those bullies now and she knows that she is smart.      The patient's SIO is discontinued.  The 2 hour suicidal check in is completed.  The patient verbalizes strong urges to self harm.  The patient is sitting in the lounge amongst peers for distraction.  The patient verbalized she will seek assistance from staff if the urges become intent.  Suicidal check in at 1400, the patient reported a decrease in SI or SIB thoughts.  The patient has remained safe thus far since the staff was discontinued.       Mental " health symptoms: The patient rates her depression 7/10 and anxiety 6/10.  Endorses auditory hallucinations.        Medication compliance: The patient is compliant with her medications.      Medical Concerns: Denies pain and medical concerns.      PRN's: Zyprexa 10mg for auditory hallucinations.      Precautions: SI and SIB.    Continue with plan of care

## 2025-04-17 NOTE — PLAN OF CARE
"She was sleeping early in the shift and woke up around 1640.  States she is not having pain/discomfort or cold, flu like symptoms when asked.  She did state that she had a bowel movement this afternoon.  States that she is having anxiety and feeling depressed with urges to, \" cut myself.\"  However, she stated that she did not need any medications and agrees to contract for safety and to notify staff if those urges worsen in first interaction with her.  States that her auditory hallucinations are, \" quiet,\" in early interactions.  She is active in the lounge area at times, but does not interact with other patients.  She is soft-spoken in interactions with writer.  She was encouraged to spend time in the lounge area to keep distracted and for staff to better observe her, which she agreed to do so.  States that she saw plastic silverware and had urges to cut herself at dinner, but did not engaging in self-injurious behaviors and was given support by writer.  After talking on the telephone after dinner, she returned to her room and writer followed up with her.  She stated that her auditory hallucinations are worsening and is having stronger urges to cut herself.  Writer encouraged her to sit in the lounge area and klonopin, lamictal, and zypexa was given at 1900 after talking medication options with her.  She also used her headset to help mask her auditory hallucinations after encouragement from writer.  Writer followed up with her at 1930 and stated that her auditory hallucinations are a little quieter, but still having urges to cut herself.  However, she did say that those urges are less intense compared to earlier.  She agreed to stay in the lounge area and writer followed up with her at 2000.  At 2000, she stated that half her thoughts are good and the other half are bad, \" in my brain,\" yet did states urges to cut herself are less intense and agrees to contract for safety.  HS medication given at 2000 along with " PRN atarax.  She stated she was tired and wanted to sleep at 2030 and the topical medication was applied by female RN.  Writer and charge RN discussed with her that coming to staff for concerns, needs, staying in the lounge area, and using the headset can help with her distress.  She agreed to contract for safety in front of writer and charge RN before going to bed.  Charge RN notified of patient condition throughout the shift.

## 2025-04-17 NOTE — PLAN OF CARE
0BEH IP Unit Acuity Rating Score (UARS)  Patient is given one point for every criteria they meet.    CRITERIA SCORING   On a 72 hour hold, court hold, committed, stay of commitment, or revocation. 1    Patient LOS on BEH unit exceeds 20 days. 0  LOS: 14   Patient under guardianship, 55+, otherwise medically complex, or under age 11. 0   Suicide ideation without relief of precipitating factors. 0   Current plan for suicide. 0   Current plan for homicide. 0   Imminent risk or actual attempt to seriously harm another without relief of factors precipitating the attempt. 0   Severe dysfunction in daily living (ex: complete neglect for self care, extreme disruption in vegetative function, extreme deterioration in social interactions). 1   Recent (last 7 days) or current physical aggression in the ED or on unit. 0   Restraints or seclusion episode in past 72 hours. 0   Recent (last 7 days) or current verbal aggression, agitation, yelling, etc., while in the ED or unit. 0   Active psychosis. 0   Need for constant or near constant redirection (from leaving, from others, etc).  0   Intrusive or disruptive behaviors. 0   Patient requires 3 or more hours of individualized nursing care per 8-hour shift (i.e. for ADLs, meds, therapeutic interventions). 1   TOTAL 3

## 2025-04-18 PROCEDURE — 97150 GROUP THERAPEUTIC PROCEDURES: CPT | Mod: GO

## 2025-04-18 PROCEDURE — 250N000013 HC RX MED GY IP 250 OP 250 PS 637: Performed by: PSYCHIATRY & NEUROLOGY

## 2025-04-18 PROCEDURE — 250N000013 HC RX MED GY IP 250 OP 250 PS 637: Performed by: NURSE PRACTITIONER

## 2025-04-18 PROCEDURE — 124N000002 HC R&B MH UMMC

## 2025-04-18 PROCEDURE — 99233 SBSQ HOSP IP/OBS HIGH 50: CPT | Performed by: PSYCHIATRY & NEUROLOGY

## 2025-04-18 PROCEDURE — 250N000013 HC RX MED GY IP 250 OP 250 PS 637: Performed by: STUDENT IN AN ORGANIZED HEALTH CARE EDUCATION/TRAINING PROGRAM

## 2025-04-18 RX ADMIN — MICONAZOLE NITRATE: 20 POWDER TOPICAL at 20:41

## 2025-04-18 RX ADMIN — OLANZAPINE 10 MG: 10 TABLET, FILM COATED ORAL at 16:41

## 2025-04-18 RX ADMIN — TRAZODONE HYDROCHLORIDE 50 MG: 50 TABLET ORAL at 02:16

## 2025-04-18 RX ADMIN — DOCUSATE SODIUM 100 MG: 100 CAPSULE, LIQUID FILLED ORAL at 20:41

## 2025-04-18 RX ADMIN — LAMOTRIGINE 150 MG: 100 TABLET ORAL at 08:22

## 2025-04-18 RX ADMIN — METFORMIN HYDROCHLORIDE 1000 MG: 500 TABLET, FILM COATED ORAL at 18:16

## 2025-04-18 RX ADMIN — METFORMIN HYDROCHLORIDE 1000 MG: 500 TABLET, FILM COATED ORAL at 08:22

## 2025-04-18 RX ADMIN — Medication 15 MG: at 08:22

## 2025-04-18 RX ADMIN — CLONAZEPAM 1 MG: 0.5 TABLET ORAL at 08:22

## 2025-04-18 RX ADMIN — OLANZAPINE 15 MG: 15 TABLET, FILM COATED ORAL at 20:40

## 2025-04-18 RX ADMIN — SENNOSIDES AND DOCUSATE SODIUM 1 TABLET: 50; 8.6 TABLET ORAL at 08:22

## 2025-04-18 RX ADMIN — CLONAZEPAM 1 MG: 0.5 TABLET ORAL at 20:41

## 2025-04-18 RX ADMIN — PRAZOSIN HYDROCHLORIDE 3 MG: 2 CAPSULE ORAL at 20:40

## 2025-04-18 RX ADMIN — CITALOPRAM HYDROBROMIDE 30 MG: 10 TABLET ORAL at 08:22

## 2025-04-18 RX ADMIN — DOCUSATE SODIUM 100 MG: 100 CAPSULE, LIQUID FILLED ORAL at 08:22

## 2025-04-18 RX ADMIN — POLYETHYLENE GLYCOL 3350 17 G: 17 POWDER, FOR SOLUTION ORAL at 08:21

## 2025-04-18 RX ADMIN — LAMOTRIGINE 150 MG: 100 TABLET ORAL at 20:40

## 2025-04-18 RX ADMIN — Medication 12.5 MCG: at 08:22

## 2025-04-18 RX ADMIN — CLONAZEPAM 1 MG: 0.5 TABLET ORAL at 14:03

## 2025-04-18 RX ADMIN — TRAZODONE HYDROCHLORIDE 300 MG: 150 TABLET ORAL at 20:40

## 2025-04-18 ASSESSMENT — ACTIVITIES OF DAILY LIVING (ADL)
ADLS_ACUITY_SCORE: 35
DRESS: INDEPENDENT
ADLS_ACUITY_SCORE: 35
LAUNDRY: WITH SUPERVISION
ADLS_ACUITY_SCORE: 35
LAUNDRY: WITH SUPERVISION
ADLS_ACUITY_SCORE: 35
DRESS: INDEPENDENT
ADLS_ACUITY_SCORE: 35
ORAL_HYGIENE: INDEPENDENT
HYGIENE/GROOMING: INDEPENDENT
ADLS_ACUITY_SCORE: 35
ADLS_ACUITY_SCORE: 35
HYGIENE/GROOMING: INDEPENDENT
ADLS_ACUITY_SCORE: 35
ORAL_HYGIENE: INDEPENDENT
ADLS_ACUITY_SCORE: 35
ADLS_ACUITY_SCORE: 35

## 2025-04-18 NOTE — PROGRESS NOTES
"[facility], [place]   Psychiatric Progress Note        Interim History:     The patient's care was discussed with the treatment team during the daily team meeting and/or staff's chart notes were reviewed.  Staff report patient slept for 6.25 hours. She has been attending groups. She continues to report urges to self harm. The brush has been helpful with the self harm thoughts. She takes medications as prescribed.     Met with patient in the conference room. Pt was dressed in hospital scrubs. Pt displays anxiety as evidenced by shaking and wringing her hands. She reports that she still have urges to self harm but she has been reporting to staff when these urges are there. She also reports keeping herself busy in the unit like going to groups and being on the milieu has been helpful than when she is isolated. She tells us that she would not use  to kill self: \"if I wanted to do that, I would use pills\". She at the same time tells us that she wants to discharge. She adds that she does not want to kill herself, she just like the sensation of self cutting. She contracts for safety while in the unit. She says she will try and not self harm this weekend so that she can discharge back home.          Medications:     Current Facility-Administered Medications   Medication Dose Route Frequency Provider Last Rate Last Admin    ARIPiprazole (ABILIFY) half-tab 15 mg  15 mg Oral Daily Jd Jones MD   15 mg at 04/18/25 0822    citalopram (celeXA) tablet 30 mg  30 mg Oral Daily Jd Jones MD   30 mg at 04/18/25 0822    clonazePAM (klonoPIN) tablet 1 mg  1 mg Oral TID Nissa Morris APRN CNP   1 mg at 04/18/25 0822    docusate sodium (COLACE) capsule 100 mg  100 mg Oral BID Nissa Morris APRN CNP   100 mg at 04/18/25 0822    lamoTRIgine (LaMICtal) tablet 150 mg  150 mg Oral BID Nissa Morris APRN CNP   150 mg at 04/18/25 0822    levothyroxine " "(SYNTHROID/LEVOTHROID) half-tab 12.5 mcg  12.5 mcg Oral QAM AC Nissa Morris APRN CNP   12.5 mcg at 04/18/25 0822    metFORMIN (GLUCOPHAGE) tablet 1,000 mg  1,000 mg Oral BID w/meals Nissa Morris APRN CNP   1,000 mg at 04/18/25 0822    miconazole (MICATIN) 2 % powder   Topical BID Jd Jones MD   Given at 04/17/25 2028    OLANZapine (zyPREXA) tablet 15 mg  15 mg Oral At Bedtime Nissa Morris APRN CNP   15 mg at 04/17/25 1900    prazosin (MINIPRESS) capsule 3 mg  3 mg Oral At Bedtime Mary Lou Torrez MD   3 mg at 04/17/25 2006    traZODone (DESYREL) tablet 300 mg  300 mg Oral At Bedtime Nissa Morris APRN CNP   300 mg at 04/17/25 2006          Allergies:   No Known Allergies       Labs:   No results found for this or any previous visit (from the past 24 hours).       Psychiatric Examination:     /87 (BP Location: Right arm)   Pulse 96   Temp 97.6  F (36.4  C) (Temporal)   Resp 18   Ht 1.651 m (5' 5\")   Wt (!) 146 kg (321 lb 12.8 oz)   SpO2 97%   BMI 53.55 kg/m    Weight is 321 lbs 12.8 oz  Body mass index is 53.55 kg/m .    Orthostatic Vitals         Most Recent      Standing Orthostatic /80 04/18 0820    Standing Orthostatic Pulse (bpm) 113 04/18 0820          Appearance: awake, alert, dressed in hospital scrubs, appeared as age stated, and morbidly obese  Attitude: somewhat cooperative  Eye Contact: fair  Mood: anxious and sad   Affect:  constricted mobility;  flat , blunted  Speech:  soft, coherent and decreased prosody  Psychomotor Behavior:  no evidence of tardive dyskinesia, dystonia, or tics  Throught Process: linear, concrete  Associations:  no loose associations  Thought Content:  passive suicidal ideation, Self injurious behavior with urges to cut self present (did not self cut yesterday), denies visual hallucinations, AH present   Insight:  limited, but improving  Judgement:  limited  Oriented to:  time, person, and place  Attention " Span and Concentration:  limited  Recent and Remote Memory:  fair    Clinical Global Impressions  First:7/4 4/4/2025     Most recent:            Precautions:     Behavioral Orders   Procedures    Code 1 - Restrict to Unit    Routine Programming     As clinically indicated    Self Injury Precaution    Status 15     Every 15 minutes.    Suicide precautions: Suicide Risk: HIGH; Clinical rationale to override score: Exhibiting Suicidal/self-harm behaviors or thoughts     Patients on Suicide Precautions should have a Combination Diet ordered that includes a Diet selection(s) AND a Behavioral Tray selection for Safe Tray - with utensils, or Safe Tray - NO utensils       Order Specific Question:   Suicide Risk     Answer:   HIGH     Order Specific Question:   Clinical rationale to override score:     Answer:   Exhibiting Suicidal/self-harm behaviors or thoughts          DIagnoses:     Borderline Personality Disorder  Major Depressive Disorder Recurrent with Psychosis         Plan:     Started Celexa 20 mg Daily and Micatin powder 2% for rash under the breast on 4/7/2025. Zyprexa was decreased on 4/3/2025. We increased Abilify on 4/14. Increased Celexa dose on 4/15/2025. SIO was discontinued on 4/17/25 and NO roommate order is still in place. Will continue to provide support and structure. Plan is to return to the Community Residential Services at [facility].         I was present with PA student who participated in the service and in the documentation of the note. I have verified the history and personally performed the physical exam and medical decision making. I agree with the assessment and plan of care as documented in the note.     Jd Jones MD  [facility] Psychiatry      Total time spent was 37 minutes. Over 50% of times was spent counseling and coordination of care regarding coping skills, medication and discharge planning.

## 2025-04-18 NOTE — PLAN OF CARE
Goal Outcome Evaluation:      Pt came up to the nurses station and received Trazodone 50 mg po per request for insomnia with good results.  She was a sleep for the rest of the shift. Pt a wake x1 briefly otherwise appeared to be a sleep at all other safety checks.  Pt slept 6.25 hours.

## 2025-04-18 NOTE — PLAN OF CARE
Team Note Due:  Thursday    Assessment/Intervention/Current Symtoms and Care Coordination:  Chart review and met with team, discussed pt progress, symptomology, and response to treatment.  Discussed the discharge plan and any potential impediments to discharge.      Per Team:  Pt has been getting conflictual messages about her motivation to return to the [facility].  Pt is off SIO.      TCM inquired as to whether pt was still here as it have now been 15 days.      Discharge Plan or Goal:  Return to the Community Residential Services at [facility].      Barriers to Discharge:  Too symptomatic     Referral Status:  Insurance  [facility] and Medicare      Has all established providers      Primary Care Provider:  Name/Clinic:      Number:         Medication Management/Psychiatry:  [facility]  Mental Health Clinic   [address] [address]   [phone]  Betty Cazares, WILLIAN, CNP   [address] [place] [address]   [phone] (Work)   [phone] (Fax)          Therapist:   Name/Clinic: Monse Hannah, LICSW               DBT Program  [facility]  3 days a week        /ACT Team:  Name/Clinic: Marleny Núñez  [facility]   Number: [phone]  Fax: [phone]  [email or web address].        CADI   [facility]  [facility]  Assisted Living     Community Residential Services  Name/Clinic: [facility]  Avril Roberto is the supervisor  Number: .[phone]  Pt lives at:  [address] [place] [address]     Legal Status:  There is no [place].  recommitted - but still on the PD from last admission   4/10/25 - C  County: [place]  File Number: New recommittment is   Start April 9, 2025  and   expiration date of May 1, 2026      Contacts (include GEORGI status):          Upcoming Meetings and Dates/Important Information and next steps:      Coordinate transfer back to [facility] -  CRS home.

## 2025-04-18 NOTE — PLAN OF CARE
Problem: Adult Behavioral Health Plan of Care  Goal: Plan of Care Review  Recent Flowsheet Documentation  Taken 4/18/2025 0820 by No Gaona RN  Patient Agreement with Plan of Care: agrees   Goal Outcome Evaluation:                      Presentation: The patient is observed in the dining room eating breakfast.  She is sitting at a table alone.  Her affect is flat and blunted.  Speech is soft and quiet.  Mood is sadness. Thoughts indicate poor concentration.      Mental health symptoms: Suicide check in is completed at the beginning and end of the shift.  The patient reports having urges to self harm.  In the morning she reported having thoughts to use utensil that were laying around.  The patient was able to tell staff her thoughts and remove herself from the dining room.  In the afternoon, the patient reports having thoughts to use the lid from the drink containers.  The patient has done a good job with distraction and seeking out staff when needing conversation.  Staff is updated on the patients thoughts and meal trays are removed when done.  Patient trays will be held in the kitchen and staff will remove drink cups after pouring in the drink cup.       Medication compliance: The patient is complaint with her medications.      Medical Concerns: The patient request miralax and senna with no results.     Precautions:  Suicidal and SIB.      Continue with plan of care

## 2025-04-18 NOTE — PLAN OF CARE
"Problem: Suicidal Behavior  Goal: Suicidal Behavior is Absent or Managed  Outcome: Progressing     Problem: Psychotic Signs/Symptoms  Goal: Improved Behavioral Control (Psychotic Signs/Symptoms)  Outcome: Progressing    Patient has been observed sitting in lounge watching TV or in room resting and listening to headphones. Affect is flat and sullen. Appearance somewhat unkempt. Patient reports she continues to have urges to self harm, but agrees that she will notify staff before acting on them. Patient also endorses anxiety and depression 7/10, and auditory hallucinations. She denies any visual hallucinations or thoughts to harm others. PRN zyprexa was requested and given which patient later reported was helpful. Patient did not attend group or interact much with others. She came out to eat dinner, and gave staff foil cover for orange juice so that she would not try to cut with it later. She continues to agree that she will notify staff of any SIB thoughts or urges before acting on them. No SIB noted or reported through out the shift. Patient took all medications scheduled at bedtime. No other PRN requests this shift.     /89   Pulse 76   Temp 98.2  F (36.8  C) (Temporal)   Resp 18   Ht 1.651 m (5' 5\")   Wt (!) 146 kg (321 lb 12.8 oz)   SpO2 97%   BMI 53.55 kg/m         "

## 2025-04-18 NOTE — PLAN OF CARE
0BEH IP Unit Acuity Rating Score (UARS)  Patient is given one point for every criteria they meet.    CRITERIA SCORING   On a 72 hour hold, court hold, committed, stay of commitment, or revocation. 1    Patient LOS on BEH unit exceeds 20 days. 0  LOS: 15   Patient under guardianship, 55+, otherwise medically complex, or under age 11. 0   Suicide ideation without relief of precipitating factors. 0   Current plan for suicide. 0   Current plan for homicide. 0   Imminent risk or actual attempt to seriously harm another without relief of factors precipitating the attempt. 0   Severe dysfunction in daily living (ex: complete neglect for self care, extreme disruption in vegetative function, extreme deterioration in social interactions). 1   Recent (last 7 days) or current physical aggression in the ED or on unit. 0   Restraints or seclusion episode in past 72 hours. 0   Recent (last 7 days) or current verbal aggression, agitation, yelling, etc., while in the ED or unit. 0   Active psychosis. 0   Need for constant or near constant redirection (from leaving, from others, etc).  0   Intrusive or disruptive behaviors. 0   Patient requires 3 or more hours of individualized nursing care per 8-hour shift (i.e. for ADLs, meds, therapeutic interventions). 0   TOTAL 2

## 2025-04-18 NOTE — PLAN OF CARE
Rehab Group    Start time: 10:15  End time: 11:40  Patient time total: 70 minutes    attended partial group    #7 attended   Group Type: OT Clinic   Group Topic Covered: balanced lifestyle, coping skills, healthy leisure time, relaxation , and social skills       Group Session Detail:  Pt actively participated in occupational therapy clinic to facilitate coping skill exploration, creative expression within personally meaningful activities, and clinical observation of social, cognitive, and kinesthetic performance skills.      Patient Response/Contribution:  cooperative with task, organized, safe use of materials/supplies, attentive, actively engaged, and engaged socially when prompted, somewhat withdrawn      Patient Detail:  Pt response: Patient arrived to this group on time and independently selected to continue work on a previously started sun catcher decorating task. Patient was independent to initiate, gather materials, sequence, and adjust to workspace demands as needed. Demonstrated good focus, planning, and problem solving for selected sun catcher task. Able to ask for assistance as needed; however, observed social interactions were minimal. Patient did provide appropriate responses when prompted, though. Patient also appeared to enjoy listening to music with peers and offered song suggestions for group members at times. After completing the selected sun catcher task, patient expressed satisfaction with the final product and brightened slightly when this writer complimented her artwork. After this, patient expressed wanting to begin another sun catcher coloring task and demonstrated good focus while engaging with this project. Patient left group on one occasion to meet with a unit provider; however, later returned to the task. Patient independently initiated and completed clean up of task supplies prior to leaving group roughly five minutes early. Affect appeared flat and withdrawn; however, patient  remained calm and cooperative during this group.       [id number] OT Group (2 or more in attendance)      Patient Active Problem List   Diagnosis    Depression    Suicidal ideation    Depression with suicidal ideation    Major depression    Suicidal behavior    Facial cellulitis    Auditory hallucination    Psychosis, unspecified psychosis type (H)    Morbid obesity (H)    Chronic insomnia    Generalized anxiety disorder    COVID-19    Borderline personality disorder (H)    Depression, unspecified depression type

## 2025-04-19 PROCEDURE — 250N000013 HC RX MED GY IP 250 OP 250 PS 637: Performed by: PSYCHIATRY & NEUROLOGY

## 2025-04-19 PROCEDURE — 124N000002 HC R&B MH UMMC

## 2025-04-19 PROCEDURE — 250N000013 HC RX MED GY IP 250 OP 250 PS 637: Performed by: NURSE PRACTITIONER

## 2025-04-19 PROCEDURE — 250N000013 HC RX MED GY IP 250 OP 250 PS 637: Performed by: STUDENT IN AN ORGANIZED HEALTH CARE EDUCATION/TRAINING PROGRAM

## 2025-04-19 RX ADMIN — SENNOSIDES AND DOCUSATE SODIUM 1 TABLET: 50; 8.6 TABLET ORAL at 08:06

## 2025-04-19 RX ADMIN — Medication 15 MG: at 08:04

## 2025-04-19 RX ADMIN — METFORMIN HYDROCHLORIDE 1000 MG: 500 TABLET, FILM COATED ORAL at 17:41

## 2025-04-19 RX ADMIN — Medication 12.5 MCG: at 08:05

## 2025-04-19 RX ADMIN — TRAZODONE HYDROCHLORIDE 300 MG: 150 TABLET ORAL at 20:31

## 2025-04-19 RX ADMIN — DOCUSATE SODIUM 100 MG: 100 CAPSULE, LIQUID FILLED ORAL at 08:05

## 2025-04-19 RX ADMIN — PRAZOSIN HYDROCHLORIDE 3 MG: 2 CAPSULE ORAL at 20:30

## 2025-04-19 RX ADMIN — CLONAZEPAM 1 MG: 0.5 TABLET ORAL at 08:05

## 2025-04-19 RX ADMIN — CITALOPRAM HYDROBROMIDE 30 MG: 10 TABLET ORAL at 08:05

## 2025-04-19 RX ADMIN — POLYETHYLENE GLYCOL 3350 17 G: 17 POWDER, FOR SOLUTION ORAL at 08:04

## 2025-04-19 RX ADMIN — CLONAZEPAM 1 MG: 0.5 TABLET ORAL at 20:30

## 2025-04-19 RX ADMIN — LAMOTRIGINE 150 MG: 100 TABLET ORAL at 08:05

## 2025-04-19 RX ADMIN — CLONAZEPAM 1 MG: 0.5 TABLET ORAL at 13:31

## 2025-04-19 RX ADMIN — DOCUSATE SODIUM 100 MG: 100 CAPSULE, LIQUID FILLED ORAL at 20:30

## 2025-04-19 RX ADMIN — TRAZODONE HYDROCHLORIDE 50 MG: 50 TABLET ORAL at 03:16

## 2025-04-19 RX ADMIN — LAMOTRIGINE 150 MG: 100 TABLET ORAL at 20:31

## 2025-04-19 RX ADMIN — METFORMIN HYDROCHLORIDE 1000 MG: 500 TABLET, FILM COATED ORAL at 08:05

## 2025-04-19 RX ADMIN — OLANZAPINE 15 MG: 15 TABLET, FILM COATED ORAL at 19:01

## 2025-04-19 ASSESSMENT — ACTIVITIES OF DAILY LIVING (ADL)
ADLS_ACUITY_SCORE: 35
DRESS: INDEPENDENT
ORAL_HYGIENE: INDEPENDENT
ADLS_ACUITY_SCORE: 35
LAUNDRY: WITH SUPERVISION
DRESS: SCRUBS (BEHAVIORAL HEALTH);INDEPENDENT
ADLS_ACUITY_SCORE: 35
LAUNDRY: WITH SUPERVISION
HYGIENE/GROOMING: INDEPENDENT
ORAL_HYGIENE: INDEPENDENT
ADLS_ACUITY_SCORE: 35
HYGIENE/GROOMING: INDEPENDENT
ADLS_ACUITY_SCORE: 35
ADLS_ACUITY_SCORE: 35

## 2025-04-19 NOTE — PLAN OF CARE
Problem: Sleep Disturbance  Goal: Adequate Sleep/Rest  Outcome: Progressing   Goal Outcome Evaluation:  Patient was observed lying in bed with her eyes closed during 15 minutes safety checks. Non-labored breathing with even chest movements were observed. Patient  was up a couple times. Patient came out of her room at 0310. Per RN,  patient  insisted to be given prn Trazodone for insomnia.Prn Trazodone was given at 0316 per patient request. Patient  slept  6 hours.

## 2025-04-19 NOTE — PLAN OF CARE
"  Problem: Adult Behavioral Health Plan of Care  Goal: Plan of Care Review  Recent Flowsheet Documentation  Taken 4/19/2025 0900 by No Gaona RN  Patient Agreement with Plan of Care: agrees   Goal Outcome Evaluation:    Plan of Care Reviewed With: patient               Presentation: The patient showered this am, dressed in clean clothing, and performed all ADL's.  The patient declined to use the powder under her breast.  The patient has a flat and blunted affect.  Speech is soft and quiet.  The patient has good eye contact.  Mood is cooperative.  Thoughts are preoccupied with thoughts of harm.       Mental health symptoms: The patient endorses depression, anxiety, thoughts of SIB.  The patient approached this writer with a folded lid from a drink cup. She handed over the lid to the writer and stated, I had thoughts of self harm.I am having thoughts that I deserve pain.  This writer and the patient discussed challenging those thoughts, self care, and positive thoughts.  The patient verbalized that she will try.        Medication compliance: The patient is compliant with all medications.      Medical Concerns: The patient denies pain and has no medical concerns.  Blood pressure 125/89, pulse 76, temperature 98.1  F (36.7  C), temperature source Oral, resp. rate 18, height 1.651 m (5' 5\"), weight (!) 146 kg (321 lb 12.8 oz), SpO2 93%, not currently breastfeeding.      PRN's: None      Precautions: SI and SIB    RECOMMENDATIONS:  PLEASE MONITOR THE PATIENT DURING MEAL TIMES.  THE PATIENT RECEIVES A SAFETY TRAY.  REMOVE THE LIDS OFF OF THE DRINK CUPS.  REMOVE PATIENTS TRAYS THAT ARE UNATTENDED.  THIS WILL PREVENT THE PATIENT FROM TAKING SILVERWARE OFF OF THE TRAYS.     Continue with plan of care          "

## 2025-04-20 PROCEDURE — 124N000002 HC R&B MH UMMC

## 2025-04-20 PROCEDURE — 250N000013 HC RX MED GY IP 250 OP 250 PS 637: Performed by: STUDENT IN AN ORGANIZED HEALTH CARE EDUCATION/TRAINING PROGRAM

## 2025-04-20 PROCEDURE — 250N000013 HC RX MED GY IP 250 OP 250 PS 637: Performed by: PSYCHIATRY & NEUROLOGY

## 2025-04-20 PROCEDURE — 250N000013 HC RX MED GY IP 250 OP 250 PS 637: Performed by: NURSE PRACTITIONER

## 2025-04-20 RX ADMIN — Medication 12.5 MCG: at 08:41

## 2025-04-20 RX ADMIN — METFORMIN HYDROCHLORIDE 1000 MG: 500 TABLET, FILM COATED ORAL at 08:41

## 2025-04-20 RX ADMIN — Medication 15 MG: at 08:41

## 2025-04-20 RX ADMIN — OLANZAPINE 15 MG: 15 TABLET, FILM COATED ORAL at 20:18

## 2025-04-20 RX ADMIN — POLYETHYLENE GLYCOL 3350 17 G: 17 POWDER, FOR SOLUTION ORAL at 08:42

## 2025-04-20 RX ADMIN — TRAZODONE HYDROCHLORIDE 300 MG: 150 TABLET ORAL at 20:18

## 2025-04-20 RX ADMIN — DOCUSATE SODIUM 100 MG: 100 CAPSULE, LIQUID FILLED ORAL at 20:18

## 2025-04-20 RX ADMIN — LAMOTRIGINE 150 MG: 100 TABLET ORAL at 08:41

## 2025-04-20 RX ADMIN — LAMOTRIGINE 150 MG: 100 TABLET ORAL at 20:18

## 2025-04-20 RX ADMIN — MICONAZOLE NITRATE: 20 POWDER TOPICAL at 08:42

## 2025-04-20 RX ADMIN — PRAZOSIN HYDROCHLORIDE 3 MG: 2 CAPSULE ORAL at 20:18

## 2025-04-20 RX ADMIN — DOCUSATE SODIUM 100 MG: 100 CAPSULE, LIQUID FILLED ORAL at 08:41

## 2025-04-20 RX ADMIN — METFORMIN HYDROCHLORIDE 1000 MG: 500 TABLET, FILM COATED ORAL at 19:35

## 2025-04-20 RX ADMIN — CITALOPRAM HYDROBROMIDE 30 MG: 10 TABLET ORAL at 08:41

## 2025-04-20 RX ADMIN — CLONAZEPAM 1 MG: 0.5 TABLET ORAL at 14:36

## 2025-04-20 RX ADMIN — CLONAZEPAM 1 MG: 0.5 TABLET ORAL at 08:41

## 2025-04-20 RX ADMIN — CLONAZEPAM 1 MG: 0.5 TABLET ORAL at 20:18

## 2025-04-20 ASSESSMENT — ACTIVITIES OF DAILY LIVING (ADL)
LAUNDRY: UNABLE TO COMPLETE
HYGIENE/GROOMING: INDEPENDENT
ADLS_ACUITY_SCORE: 35
ADLS_ACUITY_SCORE: 35
HYGIENE/GROOMING: INDEPENDENT
ADLS_ACUITY_SCORE: 35
DRESS: SCRUBS (BEHAVIORAL HEALTH);INDEPENDENT
ADLS_ACUITY_SCORE: 35
LAUNDRY: WITH SUPERVISION
ADLS_ACUITY_SCORE: 35
ORAL_HYGIENE: INDEPENDENT
ADLS_ACUITY_SCORE: 35
DRESS: SCRUBS (BEHAVIORAL HEALTH)
ORAL_HYGIENE: INDEPENDENT
ADLS_ACUITY_SCORE: 35
ADLS_ACUITY_SCORE: 35

## 2025-04-20 NOTE — PLAN OF CARE
Goal Outcome Evaluation:       Pt in bed at the beginning of the shift.Pt appeared asleep for 4 hours.No behavior or concerns noted this shift.Pt remains on SIO 1:1  for safety.Will continue to monitor.

## 2025-04-20 NOTE — PLAN OF CARE
"Goal Outcome Evaluation:    Plan of Care Reviewed With: patient        Pt and RN met in the pt's room. Pt presents with a flat and blunted affect. Mood is anxious and depressed. Pt stated that she is having \"strong urges\" to self harm and does not contract for safety. Pt stated she will harm herself with \"anything I find\". Pt was placed back on a SIO 5 ft due to not brandt for safety. Pt continued to endorses depression and anxiety as well as command auditory hallucination telling her to harm herself. Pt did not have any SIB during this shift. Pt is medication compliant. Appetite and fluid intake adequate. VSS. Pt denies acute physical pain. No medication side effects reported or observed this shift. Hygiene is adequate. No issues with bowel or bladder noted     PRNs Given during shift: NONE    Group Attendance: Did not attend group   Pain: 0/10        /86   Pulse 99   Temp 97.7  F (36.5  C) (Temporal)   Resp 18   Ht 1.651 m (5' 5\")   Wt (!) 146 kg (321 lb 12.8 oz)   SpO2 95%   BMI 53.55 kg/m       "

## 2025-04-20 NOTE — PLAN OF CARE
Goal Outcome Evaluation:    Assessed pt for SI/SIB.  Pt denies having any urges to hurt herself.  Pt reports feeling safe and does contract for safety. Pt agrees to come to staff if she feels unsafe.  Paged provider to discontinue the SIO order.

## 2025-04-20 NOTE — PLAN OF CARE
"  Problem: Suicidal Behavior  Goal: Suicidal Behavior is Absent or Managed  Outcome: Progressing   Goal Outcome Evaluation:    Plan of Care Reviewed With: patient        Affect is flat blunted.  Mood is calm.  Isolative and withdrawn to self.  No interaction with others observed.  Pt endorses depression and anxiety rating both at 5/10.  Assessed pt for SI/SIB.  Pt denies having any urges to hurt herself.  Pt reports feeling safe and does contract for safety. Pt agrees to come to staff if she feels unsafe.  Paged provider to discontinue the SIO order. SIO was discontinued by the provider.  Good appetite and reports no issue with sleep,  Pt has been medication compliant, no medication side effects reported or noted.  No prn given.  Calm and co-operative on the unit. Will continue to support plan of care.               ../81 (BP Location: Left arm, Patient Position: Sitting, Cuff Size: Adult Regular)   Pulse 75   Temp 98.3  F (36.8  C)   Resp 18   Ht 1.651 m (5' 5\")   Wt (!) 146 kg (321 lb 12.8 oz)   SpO2 96%   BMI 53.55 kg/m                     "

## 2025-04-21 PROCEDURE — 250N000013 HC RX MED GY IP 250 OP 250 PS 637: Performed by: PSYCHIATRY & NEUROLOGY

## 2025-04-21 PROCEDURE — 97150 GROUP THERAPEUTIC PROCEDURES: CPT | Mod: GO

## 2025-04-21 PROCEDURE — 90832 PSYTX W PT 30 MINUTES: CPT | Performed by: COUNSELOR

## 2025-04-21 PROCEDURE — 99232 SBSQ HOSP IP/OBS MODERATE 35: CPT | Performed by: PSYCHIATRY & NEUROLOGY

## 2025-04-21 PROCEDURE — 124N000002 HC R&B MH UMMC

## 2025-04-21 PROCEDURE — 250N000013 HC RX MED GY IP 250 OP 250 PS 637: Performed by: STUDENT IN AN ORGANIZED HEALTH CARE EDUCATION/TRAINING PROGRAM

## 2025-04-21 PROCEDURE — 250N000013 HC RX MED GY IP 250 OP 250 PS 637: Performed by: NURSE PRACTITIONER

## 2025-04-21 RX ADMIN — MICONAZOLE NITRATE: 20 POWDER TOPICAL at 20:37

## 2025-04-21 RX ADMIN — DOCUSATE SODIUM 100 MG: 100 CAPSULE, LIQUID FILLED ORAL at 08:39

## 2025-04-21 RX ADMIN — TRAZODONE HYDROCHLORIDE 50 MG: 50 TABLET ORAL at 02:52

## 2025-04-21 RX ADMIN — METFORMIN HYDROCHLORIDE 1000 MG: 500 TABLET, FILM COATED ORAL at 08:39

## 2025-04-21 RX ADMIN — TRAZODONE HYDROCHLORIDE 300 MG: 150 TABLET ORAL at 20:29

## 2025-04-21 RX ADMIN — CLONAZEPAM 1 MG: 0.5 TABLET ORAL at 08:39

## 2025-04-21 RX ADMIN — Medication 15 MG: at 08:39

## 2025-04-21 RX ADMIN — PRAZOSIN HYDROCHLORIDE 3 MG: 2 CAPSULE ORAL at 20:29

## 2025-04-21 RX ADMIN — Medication 12.5 MCG: at 08:39

## 2025-04-21 RX ADMIN — LAMOTRIGINE 150 MG: 100 TABLET ORAL at 20:28

## 2025-04-21 RX ADMIN — OLANZAPINE 10 MG: 10 TABLET, FILM COATED ORAL at 11:35

## 2025-04-21 RX ADMIN — LAMOTRIGINE 150 MG: 100 TABLET ORAL at 08:39

## 2025-04-21 RX ADMIN — DOCUSATE SODIUM 100 MG: 100 CAPSULE, LIQUID FILLED ORAL at 20:29

## 2025-04-21 RX ADMIN — CITALOPRAM HYDROBROMIDE 30 MG: 10 TABLET ORAL at 08:39

## 2025-04-21 RX ADMIN — METFORMIN HYDROCHLORIDE 1000 MG: 500 TABLET, FILM COATED ORAL at 18:01

## 2025-04-21 RX ADMIN — CLONAZEPAM 1 MG: 0.5 TABLET ORAL at 13:11

## 2025-04-21 RX ADMIN — CLONAZEPAM 1 MG: 0.5 TABLET ORAL at 20:28

## 2025-04-21 RX ADMIN — OLANZAPINE 15 MG: 15 TABLET, FILM COATED ORAL at 19:03

## 2025-04-21 ASSESSMENT — ACTIVITIES OF DAILY LIVING (ADL)
ADLS_ACUITY_SCORE: 47
ADLS_ACUITY_SCORE: 35
ADLS_ACUITY_SCORE: 47
ADLS_ACUITY_SCORE: 47
ADLS_ACUITY_SCORE: 35
ADLS_ACUITY_SCORE: 35
DRESS: SCRUBS (BEHAVIORAL HEALTH);INDEPENDENT
ADLS_ACUITY_SCORE: 47
ADLS_ACUITY_SCORE: 35
ADLS_ACUITY_SCORE: 35
DRESS: INDEPENDENT;SCRUBS (BEHAVIORAL HEALTH)
ADLS_ACUITY_SCORE: 47
ORAL_HYGIENE: INDEPENDENT
ORAL_HYGIENE: INDEPENDENT
ADLS_ACUITY_SCORE: 35
ADLS_ACUITY_SCORE: 47
HYGIENE/GROOMING: INDEPENDENT
ADLS_ACUITY_SCORE: 35
ADLS_ACUITY_SCORE: 47
ADLS_ACUITY_SCORE: 35
LAUNDRY: WITH SUPERVISION
ADLS_ACUITY_SCORE: 47
ADLS_ACUITY_SCORE: 35
ADLS_ACUITY_SCORE: 47
ADLS_ACUITY_SCORE: 35
ADLS_ACUITY_SCORE: 35
HYGIENE/GROOMING: INDEPENDENT
ADLS_ACUITY_SCORE: 47

## 2025-04-21 NOTE — PLAN OF CARE
"  Problem: Adult Behavioral Health Plan of Care  Goal: Plan of Care Review  Outcome: Progressing  Flowsheets (Taken 4/20/2025 1700)  Patient Agreement with Plan of Care: agrees     Problem: Psychotic Signs/Symptoms  Goal: Improved Behavioral Control (Psychotic Signs/Symptoms)  Outcome: Progressing   Goal Outcome Evaluation:    Plan of Care Reviewed With: patient        Pt is isolative and withdrawn to room all shift, but came out for meals. She endorsed auditory hallucinations. \"The voices are telling me to die and become an anmol.\" She also endorsed SIB with urges to cut self. She rated anxiety at 4, depression 12/10, denied HI, VH, and contracted for safety. PRN antianxiety medication offered, but pt declined. Pt was encouraged to sit in the lounge to interact with peers as a means of  distraction from voices and thoughts and urges to cut self. Pt agreed. She sat up in the lounge for about an hour, had snacks, then  took her bed time medication before going to bed. Pt presents with a flat and blunted affect. Stated her mood is ok. Judgment and insight not appropriate to situation. Pt remained pleasant, calm, cooperative and medication compliant. No other concerns noted or verbalized. Will continue with same plan of care.       "

## 2025-04-21 NOTE — PLAN OF CARE
"Goal Outcome Evaluation:    Individual Therapy Note      Date of Service: April 21, 2025    Patient: Misty goes by \"Misty,\" uses she/her pronouns    Individuals Present: Misty Sanju Zeb SARAH Segovia    Session start: 12:00 pm  Session end: 12:30 pm  Session duration in minutes: 30      Modality Used: DBT and Person Centered    Goals: talk about SIB urges and how to manage    Patient Description of current symptoms: urges to self harm, low self esteem, lack of boundaries with ex  and sons ( financial boundaries)     Mental Status Exam:   Attitude: cooperative  Eye Contact: fair  Mood: better  Affect: appropriate and in normal range  Speech: clear, coherent  Psychomotor Behavior: no evidence of tardive dyskinesia, dystonia, or tics  Thought Process:  linear  Associations: no loose associations  Thought Content: passive suicidal ideation present and thoughts of self-harm, which are decreased  Insight: fair  Judgement: limited  Attention Span and Concentration: intact    Pt progress: pt is willing to learn skills and most effective is talking to someone, sister or staff to seek support when she has SIB urges    Treatment Objective(s) Addressed:   The focus of this session was on building distress tolerance and building self-esteem     Progress Towards Goals and Assessment of Patient:   Patient is making progress towards treatment goals as evidenced by willingness to engage and learn skills.       Therapeutic Intervention(s):   Provided active listening, unconditional positive regard, and validation.   Engaged in safety planning identifying coping skills, warning signs, health support and resources, Explored motivation for behavioral change, Engaged in cognitive restructuring/ reframing, looked at common cognitive distortions and challenged negative thoughts, Provided positive reinforcement for progress towards goals, gains in knowledge, and application of skills previously taught, Coached on coping " techniques/relaxation skills to help improve distress tolerance and managing intense emotions. , Discussed TIPP (body temperature, intense exercise, PMR), Identified stress relief practices, Explored strategies for self-soothing, Introduced and practiced Wise Mind Introduced and practiced urge surfing, and Discussed and practiced mindfulness      Safety Plan: reviewed and updated with patient    Plan/next step: pt said learning Polish / Irish words helps, writer reviewed some new phrases with her . Continue to talk to her about improving coping for self harm urges and setting financial boundaries with ex  and sons    [id number] - Psychotherapy (with patient) - 30 (16-37*) min    Patient Active Problem List   Diagnosis    Depression    Suicidal ideation    Depression with suicidal ideation    Major depression    Suicidal behavior    Facial cellulitis    Auditory hallucination    Psychosis, unspecified psychosis type (H)    Morbid obesity (H)    Chronic insomnia    Generalized anxiety disorder    COVID-19    Borderline personality disorder (H)    Depression, unspecified depression type

## 2025-04-21 NOTE — PROGRESS NOTES
[facility], [place]   Psychiatric Progress Note        Interim History:     The patient's care was discussed with the treatment team during the daily team meeting and/or staff's chart notes were reviewed.  Staff report patient slept for 6.75 hours. She endorsed AH and urges to to cut self. She is isolative and withdrawn, does not interact with peers. She attends groups. Takes her medications as prescribed.     Met with patient in the presence of the student. Pt was dressed in hospital scrubs. Pt was calm and cooperative during conversation. Pt reports that she has not self harmed for a few days although she still has urges to scratch her wrist. She added that yesterday the urges got intense and she had an object that she could have used and instead reported to her nurse who took the object away. She adds that keeping herself busy and self destructing has been helpful in preventing self harm. She reports that she feels safe to discharge home. When we asked what she will do differently once she discharges, she says she will report to staff when the urges are there. Today she denies the intent to use a  to cut her wrist once she discharges. She says she more coping skills such as self distraction activities and reaching out to staff which she never did before. She reports hearing voices of angels telling her to die and become anmol. She further adds that she likes angels. She also reports that yesterday her family called and they were enjoying Easter as a family while she missed, she says that she got depressed after talking to them but was able to self regulate her emotions afterwards. We acknowledged her effort not to self harm and encouraged her to continue utilizing her coping skills with plan to discharge within next 2-3 days if she continues to be safe.         Medications:     Current Facility-Administered Medications   Medication Dose Route Frequency Provider Last Rate  "Last Admin    ARIPiprazole (ABILIFY) half-tab 15 mg  15 mg Oral Daily Jd Jones MD   15 mg at 04/21/25 0839    citalopram (celeXA) tablet 30 mg  30 mg Oral Daily Jd Jones MD   30 mg at 04/21/25 0839    clonazePAM (klonoPIN) tablet 1 mg  1 mg Oral TID Nissa Morris APRN CNP   1 mg at 04/21/25 0839    docusate sodium (COLACE) capsule 100 mg  100 mg Oral BID Nissa Morris APRN CNP   100 mg at 04/21/25 0839    lamoTRIgine (LaMICtal) tablet 150 mg  150 mg Oral BID Nissa Morris APRN CNP   150 mg at 04/21/25 0839    levothyroxine (SYNTHROID/LEVOTHROID) half-tab 12.5 mcg  12.5 mcg Oral QAM AC Nissa Morris APRN CNP   12.5 mcg at 04/21/25 0839    metFORMIN (GLUCOPHAGE) tablet 1,000 mg  1,000 mg Oral BID w/meals Nissa Morris APRN CNP   1,000 mg at 04/21/25 0839    miconazole (MICATIN) 2 % powder   Topical BID Jd Jones MD   Given at 04/20/25 0842    OLANZapine (zyPREXA) tablet 15 mg  15 mg Oral At Bedtime Nissa Morris APRN CNP   15 mg at 04/20/25 2018    prazosin (MINIPRESS) capsule 3 mg  3 mg Oral At Bedtime Mary Lou Torrez MD   3 mg at 04/20/25 2018    traZODone (DESYREL) tablet 300 mg  300 mg Oral At Bedtime Nissa Morris APRN CNP   300 mg at 04/20/25 2018          Allergies:   No Known Allergies       Labs:   No results found for this or any previous visit (from the past 24 hours).       Psychiatric Examination:     /78 (Patient Position: Sitting)   Pulse 90   Temp 98.2  F (36.8  C) (Temporal)   Resp 17   Ht 1.651 m (5' 5\")   Wt (!) 146 kg (321 lb 12.8 oz)   SpO2 93%   BMI 53.55 kg/m    Weight is 321 lbs 12.8 oz  Body mass index is 53.55 kg/m .    Orthostatic Vitals         Most Recent      Sitting Orthostatic /78 04/21 0800    Sitting Orthostatic Pulse (bpm) 90 04/21 0800    Standing Orthostatic /84 04/21 0800    Standing Orthostatic Pulse (bpm) 90 04/21 0800          Appearance: " awake, alert, dressed in hospital scrubs, appeared as age stated, and morbidly obese  Attitude: cooperative  Eye Contact: fair  Mood: anxious and sad   Affect:  constricted mobility;     Speech:  soft, coherent and decreased prosody  Psychomotor Behavior:  no evidence of tardive dyskinesia, dystonia, or tics  Throught Process: linear, concrete  Associations:  no loose associations  Thought Content:  denies active Suicidal ideation. Self injurious behavior with urges to cut self present (has not cut self for the past few days), denies visual hallucinations, Endorses AH, See discussion above.   Insight:  limited, but improving  Judgement:  limited  Oriented to:  time, person, and place  Attention Span and Concentration:  limited  Recent and Remote Memory:  fair    Clinical Global Impressions  First:7/4 4/4/2025     Most recent:            Precautions:     Behavioral Orders   Procedures    Code 1 - Restrict to Unit    Routine Programming     As clinically indicated    Self Injury Precaution    Status 15     Every 15 minutes.    Suicide precautions: Suicide Risk: HIGH; Clinical rationale to override score: Exhibiting Suicidal/self-harm behaviors or thoughts     Patients on Suicide Precautions should have a Combination Diet ordered that includes a Diet selection(s) AND a Behavioral Tray selection for Safe Tray - with utensils, or Safe Tray - NO utensils       Order Specific Question:   Suicide Risk     Answer:   HIGH     Order Specific Question:   Clinical rationale to override score:     Answer:   Exhibiting Suicidal/self-harm behaviors or thoughts          DIagnoses:     Major Depressive Disorder Recurrent with Psychosis  Borderline Personality Disorder         Plan:     Started Celexa 20 mg Daily and Micatin powder 2% for rash under the breast on 4/7/2025. Zyprexa was decreased on 4/3/2025. We increased Abilify on 4/14. Increased Celexa dose on 4/15/2025. No medication change today 4/21/25. SIO was discontinued on  4/20/25 and NO roommate order is still in place. Will continue to provide support and structure. Plan [facility] to [facility] in 2-3 days if no Self injurious behavior.         I was present with PA student who participated in the service and in the documentation of the note. I have verified the history and personally performed the physical exam and medical decision making. I agree with the assessment and plan of care as documented in the note.     Jd Jones MD  [facility]    Total time spent was 35 minutes. Over 50% of times was spent counseling and coordination of care regarding coping skills, medication and discharge planning.

## 2025-04-21 NOTE — PLAN OF CARE
"  Problem: Adult Behavioral Health Plan of Care  Goal: Adheres to Safety Considerations for Self and Others  Outcome: Progressing  Flowsheets (Taken 4/21/2025 1143)  Adheres to Safety Considerations for Self and Others: making progress toward outcome     Problem: Adult Behavioral Health Plan of Care  Goal: Optimized Coping Skills in Response to Life Stressors  Outcome: Progressing  Flowsheets (Taken 4/21/2025 1143)  Optimized Coping Skills in Response to Life Stressors: making progress toward outcome     Brief shift overview: Pt visible on unit attending meals, watching television, and sitting in the lounge listening to headphones. Pt visible intermittently in her room listening to headphones and resting in bed. Pt keeps to herself while visible on the unit and does not engage with staff and peers. Pt met with unit therapist today - see note from psychotherapist for further details. Pt attended group this shift - see OT note for further details.     Medications: Pt refused scheduled micatin powder but took the remainder of her scheduled medications. She denies any medication SE at this time. She received the following PRN medications this shift: zyprexa     Medical Issues: Pt has wound care order for her left wrist from previous SIB. When writer approached pt about this, she reports she is no longer doing wound care and reports her wrist is \"fine.\" Writer did not note any new lacerations or scratches this shift. Pt denies pain. VSS. No other medical issues noted this shift.     Mental Health: Pt rates anxiety and depression at a \"5 to 6\" out of 10 - scheduled medication given, along with PRN zyprexa per pt request. Pt denies HI and VH. Pt initially denied SI/SIB and AH; however, pt informed writer that around lunch time is when she usually starts to struggle with the urges to cut herself. She reports in the morning, she usually does not have the urge to harm herself and she also reports the voices are not always " "present in the mornings. Writer encouraged pt to discuss this with provider. Pt encouraged to seek out writer when she begins to have any thoughts of SI or SIB and to also notify writer when she hears voices. Pt contracts for safety. Around 1130, writer went to check on pt again and pt reports she was hearing voices and she was trying to distract herself by listening to music in her room. Pt also reports that she has urges to cut herself. Writer encouraged pt to spend time in the lounge area due to urges to cut herself and pt agreed. Pt given PRN zyprexa @ 1135 to help with her anxiety, agitation, and psychosis. Pt reports PRN medication helpful and declines further pharmacological and nonpharmacological interventions at this time. Pt still able to contract for safety. Pt reports she did not sleep well last night and woke up around 0300. Writer encouraged pt to discuss further with provider if she continues to have difficulty falling asleep and/or staying asleep. Pt acknowledged an understanding of this. Pt presents with a flat, blunted affect. Pt's speech observed to be soft/quiet. Pt observed to be guarded. Pt observed to be calm and cooperative. Pt appears well-kept  and appropriately dressed. Thought content observed to be preoccupation. Pt's concentration observed to be impaired at times. Pt observed to be withdrawn. Pt reports concern about her medications in regards to when she discharges to group [facility], stating, \"they always mess up my medications.\" Writer informed pt that provider will go through discharge medications with her when it is closer to discharge (no discharge date as of currently) and writer also informed her provider typically sends pt with 30 days of medications and that they will discuss whether the medications are filled here or through an outside pharmacy or pharmacy the group home uses - pt acknowledged an understanding of this. Writer spent 1:1 time with pt discussing her questions, " "concerns, and also talking about pt's coping skills. Writer provided therapeutic listening. Pt reports provider said she could possibly discharge on Wednesday. Pt reports she would like that; however, she still reports concern that group home will mess up her medications. Writer informed pt's group home will be provided with discharge instructions, as will pt. Writer discussed the discharge process per pt request. Pt acknowledged an understanding. Pt does not have a discharge order at this time. Writer acknowledged pt's progress and encouraged pt to continue to seek out staff when she needs assistance. Pt acknowledged an understanding of this. Pt continues to have a no roommate order. She remains on suicide and SIB precautions. Will continue to monitor and assist as needed.     Recommendations to oncoming staff:  Please monitor pt closely during meal times and also when pt is in the common areas as pt has hx of using straws, lids, etc., to self harm.   Please see pt's care orders before giving pt any items due to SIB hx.   Continue to support pt's plan of care.     /78 (Patient Position: Sitting)   Pulse 90   Temp 98.2  F (36.8  C) (Temporal)   Resp 17   Ht 1.651 m (5' 5\")   Wt (!) 146 kg (321 lb 12.8 oz)   SpO2 93%   BMI 53.55 kg/m      "

## 2025-04-21 NOTE — PLAN OF CARE
0BEH IP Unit Acuity Rating Score (UARS)  Patient is given one point for every criteria they meet.    CRITERIA SCORING   On a 72 hour hold, court hold, committed, stay of commitment, or revocation. 1    Patient LOS on BEH unit exceeds 20 days. 0  LOS: 18   Patient under guardianship, 55+, otherwise medically complex, or under age 11. 0   Suicide ideation without relief of precipitating factors. 0   Current plan for suicide. 0   Current plan for homicide. 0   Imminent risk or actual attempt to seriously harm another without relief of factors precipitating the attempt. 0   Severe dysfunction in daily living (ex: complete neglect for self care, extreme disruption in vegetative function, extreme deterioration in social interactions). 1   Recent (last 7 days) or current physical aggression in the ED or on unit. 0   Restraints or seclusion episode in past 72 hours. 0   Recent (last 7 days) or current verbal aggression, agitation, yelling, etc., while in the ED or unit. 0   Active psychosis. 0   Need for constant or near constant redirection (from leaving, from others, etc).  0   Intrusive or disruptive behaviors. 0   Patient requires 3 or more hours of individualized nursing care per 8-hour shift (i.e. for ADLs, meds, therapeutic interventions). 0   TOTAL 2

## 2025-04-21 NOTE — PLAN OF CARE
Problem: Sleep Disturbance  Goal: Adequate Sleep/Rest  Outcome: Progressing   Goal Outcome Evaluation:    Pt asleep at start of shift. 15 minutes safety checks done. PRN Trazodone 50 mg at 0252 for sleeplessness. Slept for 6.75 hours.

## 2025-04-21 NOTE — PLAN OF CARE
Rehab Group    Start time: 10:15  End time: 10:53  Patient time total: 20 minutes    attended partial group    #6 attended   Group Type: OT Clinic   Group Topic Covered: balanced lifestyle, coping skills, healthy leisure time, relaxation , and social skills       Group Session Detail:  Pt actively participated in occupational therapy clinic to facilitate coping skill exploration, creative expression within personally meaningful activities, and clinical observation of social, cognitive, and kinesthetic performance skills.        Patient Response/Contribution:  cooperative with task, organized, safe use of materials/supplies, attentive, actively engaged, and withdrawn     Patient Detail:  Pt response: Patient arrived to this group on time and selected to continue work on a previously started sun catcher decorating task following a verbal prompt from OT. Patient was independent to initiate, gather most materials, sequence, and adjust to workspace demands as needed. Demonstrated good focus, planning, and problem solving for selected sun catcher task. Able to ask for assistance as needed; however, observed social interactions were minimal. Patient did provide appropriate responses when prompted, though responses were often brief. Patient stepped out of group on one occasion to meet with a unit provider; however, later returned to group roughly ten minutes later. Patient opted to leave group early after completing the selected creative task, though appeared satisfied with the final product as she smiled when this writer complemented the project. Patient independently cleaned up task supplies prior to leaving group.  Affect appeared flat and somewhat withdrawn; however, patient remained calm and cooperative during this group.       [id number] OT Group (2 or more in attendance)      Patient Active Problem List   Diagnosis    Depression    Suicidal ideation    Depression with suicidal ideation    Major depression    Suicidal  behavior    Facial cellulitis    Auditory hallucination    Psychosis, unspecified psychosis type (H)    Morbid obesity (H)    Chronic insomnia    Generalized anxiety disorder    COVID-19    Borderline personality disorder (H)    Depression, unspecified depression type

## 2025-04-22 PROCEDURE — 99232 SBSQ HOSP IP/OBS MODERATE 35: CPT | Performed by: PSYCHIATRY & NEUROLOGY

## 2025-04-22 PROCEDURE — 250N000013 HC RX MED GY IP 250 OP 250 PS 637: Performed by: PSYCHIATRY & NEUROLOGY

## 2025-04-22 PROCEDURE — 250N000013 HC RX MED GY IP 250 OP 250 PS 637: Performed by: STUDENT IN AN ORGANIZED HEALTH CARE EDUCATION/TRAINING PROGRAM

## 2025-04-22 PROCEDURE — 97150 GROUP THERAPEUTIC PROCEDURES: CPT | Mod: GO

## 2025-04-22 PROCEDURE — 124N000002 HC R&B MH UMMC

## 2025-04-22 PROCEDURE — 250N000013 HC RX MED GY IP 250 OP 250 PS 637: Performed by: NURSE PRACTITIONER

## 2025-04-22 RX ADMIN — PRAZOSIN HYDROCHLORIDE 3 MG: 2 CAPSULE ORAL at 20:49

## 2025-04-22 RX ADMIN — TRAZODONE HYDROCHLORIDE 300 MG: 150 TABLET ORAL at 20:49

## 2025-04-22 RX ADMIN — DOCUSATE SODIUM 100 MG: 100 CAPSULE, LIQUID FILLED ORAL at 20:48

## 2025-04-22 RX ADMIN — CLONAZEPAM 1 MG: 0.5 TABLET ORAL at 13:22

## 2025-04-22 RX ADMIN — OLANZAPINE 15 MG: 15 TABLET, FILM COATED ORAL at 20:48

## 2025-04-22 RX ADMIN — HYDROXYZINE HYDROCHLORIDE 50 MG: 50 TABLET, FILM COATED ORAL at 17:31

## 2025-04-22 RX ADMIN — LAMOTRIGINE 150 MG: 100 TABLET ORAL at 08:09

## 2025-04-22 RX ADMIN — CITALOPRAM HYDROBROMIDE 30 MG: 10 TABLET ORAL at 08:09

## 2025-04-22 RX ADMIN — DOCUSATE SODIUM 100 MG: 100 CAPSULE, LIQUID FILLED ORAL at 08:09

## 2025-04-22 RX ADMIN — LAMOTRIGINE 150 MG: 100 TABLET ORAL at 20:48

## 2025-04-22 RX ADMIN — METFORMIN HYDROCHLORIDE 1000 MG: 500 TABLET, FILM COATED ORAL at 08:09

## 2025-04-22 RX ADMIN — METFORMIN HYDROCHLORIDE 1000 MG: 500 TABLET, FILM COATED ORAL at 17:49

## 2025-04-22 RX ADMIN — Medication 12.5 MCG: at 08:09

## 2025-04-22 RX ADMIN — MICONAZOLE NITRATE: 20 POWDER TOPICAL at 20:49

## 2025-04-22 RX ADMIN — CLONAZEPAM 1 MG: 0.5 TABLET ORAL at 08:09

## 2025-04-22 RX ADMIN — CLONAZEPAM 1 MG: 0.5 TABLET ORAL at 20:48

## 2025-04-22 RX ADMIN — Medication 15 MG: at 08:09

## 2025-04-22 ASSESSMENT — ACTIVITIES OF DAILY LIVING (ADL)
HYGIENE/GROOMING: INDEPENDENT
ADLS_ACUITY_SCORE: 47
DRESS: SCRUBS (BEHAVIORAL HEALTH)
ADLS_ACUITY_SCORE: 47
ORAL_HYGIENE: INDEPENDENT
ADLS_ACUITY_SCORE: 47
DRESS: INDEPENDENT
ADLS_ACUITY_SCORE: 47
HYGIENE/GROOMING: INDEPENDENT
ADLS_ACUITY_SCORE: 47
ORAL_HYGIENE: INDEPENDENT

## 2025-04-22 NOTE — PLAN OF CARE
"  Rehab Group    Start time: 10:15  End time: 11:00   Patient time total: 40 minutes    attended partial group    #6 attended   Group Type: OT Clinic   Group Topic Covered: balanced lifestyle, coping skills, healthy leisure time, relaxation , and social skills       Group Session Detail:  Pt actively participated in occupational therapy clinic to facilitate coping skill exploration, creative expression within personally meaningful activities, and clinical observation of social, cognitive, and kinesthetic performance skills.      Patient Response/Contribution:  cooperative with task, organized, safe use of materials/supplies, attentive, actively engaged, and withdrawn     Patient Detail:  Pt response: Patient arrived to this group somewhat late; however, independently selected to complete a small fuzzy color project. Upon approach, patient expressed feeling \"pretty good\" today and noted feeling \"excited\" about potentially discharging sometime this week. Patient was independent to initiate, gather materials, sequence, and adjust to workspace demands as needed. Demonstrated good focus, planning, and problem solving for selected coloring task. Able to ask for assistance as needed; however, observed social interactions were minimal. Patient did brighten upon approach and provided appropriate responses when prompted. Patient was also observed utilizing a soft-bristled brush intermittently throughout the group that this writer had provided her previously to manage SIB urges. Patient independently initiated and completed clean up of supplies at the end of group. Affect appeared blunted and somewhat withdrawn; however, patient remained calm and cooperative during this group.       [id number] OT Group (2 or more in attendance)      Patient Active Problem List   Diagnosis    Depression    Suicidal ideation    Depression with suicidal ideation    Major depression    Suicidal behavior    Facial cellulitis    Auditory hallucination    " Psychosis, unspecified psychosis type (H)    Morbid obesity (H)    Chronic insomnia    Generalized anxiety disorder    COVID-19    Borderline personality disorder (H)    Depression, unspecified depression type

## 2025-04-22 NOTE — PLAN OF CARE
"Problem: Depressive Signs/Symptoms  Goal: Improved Mood Symptoms (Depressive Signs/Symptoms)     Problem: Anxiety Signs/Symptoms  Goal: Improved Sleep (Anxiety Signs/Symptoms)     Goal Outcome Evaluation:       Patient was mostly isolative and withdrawn to her room. Patient observed intermittently out in the milieu, mostly for meals and snacks. Affect is flat/blunted, mood perceived as sad/depressed. Patient was compliant with mental health assessment, had denied pain, SI, visual hallucination. Patient reported anxiety 5/10, depression 7/10, SIB thought with plan and intent to cut self. Patient also endorsed auditory hallucination telling patient \"Its time to die and become an anmol.\" Patient reports feeling safe in the hospital and contracts for safety. Writer encouraged patient to spent more time in milieu and talk with staff if the urges become extreme.  Patient was accepting. Prn Hydroxyzine was administered with some effectiveness. Patient was medication compliant, no reported nor observed medication side effect. No bowel/bladder concern was reported. Last BM was this morning per patient report. No behavioral concerns this shift. Staff will continue plan of care.                  "

## 2025-04-22 NOTE — PLAN OF CARE
"  Problem: Adult Behavioral Health Plan of Care  Goal: Adheres to Safety Considerations for Self and Others  Outcome: Progressing  Intervention: Develop and Maintain Individualized Safety Plan  Recent Flowsheet Documentation  Taken 4/21/2025 2013 by Lupe Jones RN  Safety Measures:   clinical history reviewed   safety rounds completed   suicide check-in completed   Goal Outcome Evaluation:    Plan of Care Reviewed With: patient             Patient was intermittently visible in the lounge, no social interaction with peers were observed. Patient presents with a flat affect, somber mood, withdrawn to self and occasionally isolate to the room. Patient endorsed anxiety and depression, reports intense urges to cut herself. Patient was encouraged to come out to the lounge, engage in coping skills and let staff know when the urges are uncontrollable. Patient contracts for for safety. Zyprexa 15mg PO was administered to the patient. Patient endorsed command hallucination. Patient denied pain. VSS.     Patient had an adequate oral intake, no complains of bowel or bladder concerns. Patient was medication compliant, no side effects of medication was reported or observed.     Patient has remained safe on the unit and has been compliant with informing staff of her SIB urges as well as utilizing coping skills. No agitation, behavioral outbursts or acute safety concerns. Staff will continue to follow the plan of care.     /85 (BP Location: Left arm, Patient Position: Sitting, Cuff Size: Adult Large)   Pulse 73   Temp 98.6  F (37  C) (Oral)   Resp 18   Ht 1.651 m (5' 5\")   Wt (!) 146 kg (321 lb 12.8 oz)   SpO2 96%   BMI 53.55 kg/m           "

## 2025-04-22 NOTE — PLAN OF CARE
"  Problem: Adult Behavioral Health Plan of Care  Goal: Develops/Participates in Therapeutic [place] to Support Successful Transition  Intervention: Foster Therapeutic [place]  Recent Flowsheet Documentation  Taken 4/22/2025 1200 by No Gaona RN  Trust Relationship/Rapport:   care explained   choices provided   emotional support provided   empathic listening provided   questions answered   questions encouraged   reassurance provided   thoughts/feelings acknowledged   Goal Outcome Evaluation:           Presentation: The patient is observed in the milieu for 50% of the shift and in her room, resting in bed and listening to headphones.  The patient is dressed in scrubs.  Affect is flat and blunted with occasional smiles and laughter.  The patients speech is soft and quiet.  Her thoughts are distracted and preoccupied with urges to self harm.      Mental health symptoms: The patient verbalizes thoughts and urges to cut.  She continues to utilized coping skills.  The patient continues to endorse depression and anxiety 7/10.  The patient stated, \"I am ready to discharge.\"  \"I think that I can be safe.\"      Medication compliance: The patient is compliant with all medications.  Denies medication side effects.       Medical Concerns: The patient denies pain and has no medical concerns.        PRN's: None      Precautions: SI and SIB    Continue with plan of care                     "

## 2025-04-22 NOTE — PLAN OF CARE
0BEH IP Unit Acuity Rating Score (UARS)  Patient is given one point for every criteria they meet.    CRITERIA SCORING   On a 72 hour hold, court hold, committed, stay of commitment, or revocation. 1    Patient LOS on BEH unit exceeds 20 days. 0  LOS: 19   Patient under guardianship, 55+, otherwise medically complex, or under age 11. 0   Suicide ideation without relief of precipitating factors. 0   Current plan for suicide. 0   Current plan for homicide. 0   Imminent risk or actual attempt to seriously harm another without relief of factors precipitating the attempt. 0   Severe dysfunction in daily living (ex: complete neglect for self care, extreme disruption in vegetative function, extreme deterioration in social interactions). 1   Recent (last 7 days) or current physical aggression in the ED or on unit. 0   Restraints or seclusion episode in past 72 hours. 0   Recent (last 7 days) or current verbal aggression, agitation, yelling, etc., while in the ED or unit. 0   Active psychosis. 0   Need for constant or near constant redirection (from leaving, from others, etc).  0   Intrusive or disruptive behaviors. 0   Patient requires 3 or more hours of individualized nursing care per 8-hour shift (i.e. for ADLs, meds, therapeutic interventions). 0   TOTAL 2

## 2025-04-22 NOTE — PLAN OF CARE
Team Note Due:  Thursday    Assessment/Intervention/Current Symtoms and Care Coordination:  Chart review and met with team, discussed pt progress, symptomology, and response to treatment.  Discussed the discharge plan and any potential impediments to discharge.      Per Team:  Pt is no longer thinking about box-cutters.  Pt may be leaving this week.        Discharge Plan or Goal:  Return to the [facility].      Barriers to Discharge:  Too symptomatic     Referral Status:  Insurance  [facility] and Medicare      Has all established providers      Primary Care Provider:  Name/Clinic:      Number:         Medication Management/Psychiatry:  [facility]  Mental Health Clinic   [address]   [place] [address]   [phone]  Betty Cazares, APRN, CNP   [address] [place] [address]   [phone] (Work)   [phone] (Fax)          Therapist:   Name/Clinic: Monse Hannah, LICSW               DBT Program  [facility]  3 days a week        /ACT Team:  Name/Clinic: Marleny Núñez  [facility]   Number: [phone]  Fax: [phone]  [email or web address].        [facility]   [facility]  [facility]  Assisted Living     [facility]  Name/Clinic: [facility]  Avril Rodríguezisha is the supervisor  Number: .[phone]  Pt lives at:  [address] [place] [address]     Legal Status:  There is no [place].  recommitted - but still on the PD from last admission   4/10/25 - C  County: [place]  File Number: New recommittment is   Start April 9, 2025  and   expiration date of May 1, 2026      Contacts (include GEORGI status):          Upcoming Meetings and Dates/Important Information and next steps:      Coordinate transfer back to [facility].

## 2025-04-22 NOTE — PROGRESS NOTES
"[facility], [place]   Psychiatric Progress Note        Interim History:     The patient's care was discussed with the treatment team during the daily team meeting and/or staff's chart notes were reviewed.  Staff report patient slept for 5 hours. She endorsed AH and urges to to cut self, however, didn't do it and handed over to RN plastic spoon, fork and a straw that came with her food tray. Misty continues to be isolative and withdrawn, does not interact with peers. She attends groups, seen using soft brush to decrease SIB urges. Takes her medications as prescribed. See OT's note below:    \"Pt response: Patient arrived to this group somewhat late; however, independently selected to complete a small fuzzy color project. Upon approach, patient expressed feeling \"pretty good\" today and noted feeling \"excited\" about potentially discharging sometime this week. Patient was independent to initiate, gather materials, sequence, and adjust to workspace demands as needed. Demonstrated good focus, planning, and problem solving for selected coloring task. Able to ask for assistance as needed; however, observed social interactions were minimal. Patient did brighten upon approach and provided appropriate responses when prompted. Patient was also observed utilizing a soft-bristled brush intermittently throughout the group that this writer had provided her previously to manage SIB urges. Patient independently initiated and completed clean up of supplies at the end of group. Affect appeared blunted and somewhat withdrawn; however, patient remained calm and cooperative during this group.\"    Met with patient in the presence of the student. Pt was dressed in hospital scrubs. She was calm and cooperative, presented with her usual limited eye contact. Reported decrease in Auditory hallucinations, described them as \"whispers\", said that her mood and anxiety were somewhat better and denied presence of active " Suicidal ideation. Denied med side effects. We praised Misty for returning to RN plastic fork, spoon and straw, told her that if she continues to be safe we would plan on discharging her to group home this Thursday. She was glad to hear that and said that she had no other questions or concerns.          Medications:     Current Facility-Administered Medications   Medication Dose Route Frequency Provider Last Rate Last Admin    ARIPiprazole (ABILIFY) half-tab 15 mg  15 mg Oral Daily Jd Jones MD   15 mg at 04/22/25 0809    citalopram (celeXA) tablet 30 mg  30 mg Oral Daily Jd Jones MD   30 mg at 04/22/25 0809    clonazePAM (klonoPIN) tablet 1 mg  1 mg Oral TID Nissa Morris APRN CNP   1 mg at 04/22/25 1322    docusate sodium (COLACE) capsule 100 mg  100 mg Oral BID Nissa Morris APRN CNP   100 mg at 04/22/25 0809    lamoTRIgine (LaMICtal) tablet 150 mg  150 mg Oral BID Nissa Morris APRN CNP   150 mg at 04/22/25 0809    levothyroxine (SYNTHROID/LEVOTHROID) half-tab 12.5 mcg  12.5 mcg Oral QAM AC Nissa Morris APRN CNP   12.5 mcg at 04/22/25 0809    metFORMIN (GLUCOPHAGE) tablet 1,000 mg  1,000 mg Oral BID w/meals Nissa Morris APRN CNP   1,000 mg at 04/22/25 0809    miconazole (MICATIN) 2 % powder   Topical BID Jd Jones MD   Given at 04/21/25 2037    OLANZapine (zyPREXA) tablet 15 mg  15 mg Oral At Bedtime Nissa Morris APRN CNP   15 mg at 04/21/25 1903    prazosin (MINIPRESS) capsule 3 mg  3 mg Oral At Bedtime Mary Lou Torrez MD   3 mg at 04/21/25 2029    traZODone (DESYREL) tablet 300 mg  300 mg Oral At Bedtime Nissa Morris APRN CNP   300 mg at 04/21/25 2029          Allergies:   No Known Allergies       Labs:   No results found for this or any previous visit (from the past 24 hours).       Psychiatric Examination:     /85 (BP Location: Left arm, Patient Position: Sitting, Cuff Size: Adult Large)    "Pulse 73   Temp 97.1  F (36.2  C) (Oral)   Resp 18   Ht 1.651 m (5' 5\")   Wt (!) 144.3 kg (318 lb 1.6 oz)   SpO2 97%   BMI 52.93 kg/m    Weight is 318 lbs 1.6 oz  Body mass index is 52.93 kg/m .    Orthostatic Vitals         Most Recent      Sitting Orthostatic /89 04/22 0700    Sitting Orthostatic Pulse (bpm) 97.1 04/22 0700    Standing Orthostatic /81 04/22 0700    Standing Orthostatic Pulse (bpm) 114 04/22 0700           Appearance: awake, alert, dressed in hospital scrubs, appeared as age stated, and morbidly obese  Attitude: cooperative  Eye Contact: partial today  Mood: less? anxious and sad   Affect:  constricted mobility;     Speech:  soft, coherent and decreased prosody  Psychomotor Behavior:  no evidence of tardive dyskinesia, dystonia, or tics  Throught Process: linear, concrete  Associations:  no loose associations  Thought Content:  denies active Suicidal ideation. Self injurious behavior with urges to cut self present (has not cut self for the past few days), denies visual hallucinations, Endorses AH which are better, See discussion above.   Insight:  limited, but improving  Judgement:  limited  Oriented to:  time, person, and place  Attention Span and Concentration:  limited  Recent and Remote Memory:  fair    Clinical Global Impressions  First:7/4 4/4/2025     Most recent:            Precautions:     Behavioral Orders   Procedures    Code 1 - Restrict to Unit    Routine Programming     As clinically indicated    Self Injury Precaution    Status 15     Every 15 minutes.    Suicide precautions: Suicide Risk: HIGH; Clinical rationale to override score: Exhibiting Suicidal/self-harm behaviors or thoughts     Patients on Suicide Precautions should have a Combination Diet ordered that includes a Diet selection(s) AND a Behavioral Tray selection for Safe Tray - with utensils, or Safe Tray - NO utensils       Order Specific Question:   Suicide Risk     Answer:   HIGH     Order Specific " Question:   Clinical rationale to override score:     Answer:   Exhibiting Suicidal/self-harm behaviors or thoughts          DIagnoses:     Major Depressive Disorder Recurrent with Psychosis  Borderline Personality Disorder         Plan:     Started Celexa 20 mg Daily and Micatin powder 2% for rash under the breast on 4/7/2025. Zyprexa was decreased on 4/3/2025. We increased Abilify on 4/14. Increased Celexa dose on 4/15/2025. No medication change today 4/22/25. SIO was discontinued on 4/20/25 and NO roommate order is still in place. Will continue to provide support and structure. Plan is to return to the [facility] at [facility] in 2 days if no Self injurious behavior.         I was present with PA student who participated in the service and in the documentation of the note. I have verified the history and personally performed the physical exam and medical decision making. I agree with the assessment and plan of care as documented in the note.     Jd Jones MD  [facility] Psychiatry    Total time spent was 35 minutes. Over 50% of times was spent counseling and coordination of care regarding coping skills, medication and discharge planning.

## 2025-04-22 NOTE — PLAN OF CARE
"  Rehab Group    Start time: 14:00  End time: 14:45  Patient time total: 45 minutes    attended full group (only billed for 2 units due to # of participants present in session)    #2 attended   Group Type: occupational therapy   Group Topic Covered: coping skills, emotional regulation, and relaxation      Group Session Detail:  Patient engaged in a therapeutic creative expression task in order to: promote relaxation and mindfulness, improve focus and concentration, and foster creativity and self-expression.       Patient Response/Contribution:  cooperative with task, attentive, actively engaged, and engaged socially when prompted     Patient Detail:  Patient attended group with support and encouragement from unit staff. Patient was a quiet and reserved participant; offering minimal and intermittent direct eye contact. Patient remained calm, cooperative, and pleasant throughout the duration of group. Patient opted to continue work on a simple 1-step creative task; patient appears motivated by creative opportunities. Patient worked attentively on task with good attention to detail. Patient worked in a neat and organized manner. Patient sustained attention on task for full duration of time spent in group. Patient engaged in prosocial reciprocal conversation with writer and peers; only when initiated by other's. Patient responded appropriately to conversational prompts directed at her and was able to make a variety of song requests as a way to boost her mood. Patient described today's creative activity as \"relaxing\" for her. No safety or behavioral concerns noted in today's group.      [id number] OT Group (2 or more in attendance)      Patient Active Problem List   Diagnosis    Depression    Suicidal ideation    Depression with suicidal ideation    Major depression    Suicidal behavior    Facial cellulitis    Auditory hallucination    Psychosis, unspecified psychosis type (H)    Morbid obesity (H)    Chronic insomnia    " Generalized anxiety disorder    COVID-19    Borderline personality disorder (H)    Depression, unspecified depression type

## 2025-04-22 NOTE — PLAN OF CARE
NOC Shift Report    Pt in bed at beginning of shift, breathing quiet and unlabored. Pt slept through shift. Pt slept 5 hours.     No pt complaints or concerns at this time.     No PRNs given. Will continue to monitor.

## 2025-04-23 PROCEDURE — 250N000013 HC RX MED GY IP 250 OP 250 PS 637: Performed by: PSYCHIATRY & NEUROLOGY

## 2025-04-23 PROCEDURE — 97150 GROUP THERAPEUTIC PROCEDURES: CPT | Mod: GO

## 2025-04-23 PROCEDURE — 124N000002 HC R&B MH UMMC

## 2025-04-23 PROCEDURE — 250N000013 HC RX MED GY IP 250 OP 250 PS 637: Performed by: NURSE PRACTITIONER

## 2025-04-23 PROCEDURE — 250N000013 HC RX MED GY IP 250 OP 250 PS 637: Performed by: STUDENT IN AN ORGANIZED HEALTH CARE EDUCATION/TRAINING PROGRAM

## 2025-04-23 PROCEDURE — 99232 SBSQ HOSP IP/OBS MODERATE 35: CPT | Performed by: PSYCHIATRY & NEUROLOGY

## 2025-04-23 RX ORDER — DOCUSATE SODIUM 100 MG/1
100 CAPSULE, LIQUID FILLED ORAL DAILY
Qty: 60 CAPSULE | Refills: 0 | Status: SHIPPED | OUTPATIENT
Start: 2025-04-23

## 2025-04-23 RX ORDER — OLANZAPINE 15 MG/1
15 TABLET, FILM COATED ORAL AT BEDTIME
Qty: 30 TABLET | Refills: 1 | Status: SHIPPED | OUTPATIENT
Start: 2025-04-23

## 2025-04-23 RX ORDER — CITALOPRAM HYDROBROMIDE 10 MG/1
30 TABLET ORAL DAILY
Qty: 90 TABLET | Refills: 1 | Status: SHIPPED | OUTPATIENT
Start: 2025-04-24

## 2025-04-23 RX ORDER — ARIPIPRAZOLE 15 MG/1
15 TABLET ORAL DAILY
Qty: 30 TABLET | Refills: 1 | Status: SHIPPED | OUTPATIENT
Start: 2025-04-24

## 2025-04-23 RX ORDER — POLYETHYLENE GLYCOL 3350 17 G/17G
17 POWDER, FOR SOLUTION ORAL DAILY PRN
Qty: 510 G | Refills: 0 | Status: SHIPPED | OUTPATIENT
Start: 2025-04-23

## 2025-04-23 RX ORDER — HYDROXYZINE HYDROCHLORIDE 50 MG/1
50 TABLET, FILM COATED ORAL EVERY 6 HOURS PRN
Qty: 60 TABLET | Refills: 1 | Status: SHIPPED | OUTPATIENT
Start: 2025-04-23

## 2025-04-23 RX ORDER — PRAZOSIN HYDROCHLORIDE 1 MG/1
3 CAPSULE ORAL AT BEDTIME
Qty: 90 CAPSULE | Refills: 1 | Status: SHIPPED | OUTPATIENT
Start: 2025-04-23

## 2025-04-23 RX ADMIN — METFORMIN HYDROCHLORIDE 1000 MG: 500 TABLET, FILM COATED ORAL at 08:03

## 2025-04-23 RX ADMIN — CITALOPRAM HYDROBROMIDE 30 MG: 10 TABLET ORAL at 08:02

## 2025-04-23 RX ADMIN — TRAZODONE HYDROCHLORIDE 300 MG: 150 TABLET ORAL at 21:12

## 2025-04-23 RX ADMIN — Medication 12.5 MCG: at 08:03

## 2025-04-23 RX ADMIN — LAMOTRIGINE 150 MG: 100 TABLET ORAL at 21:11

## 2025-04-23 RX ADMIN — MICONAZOLE NITRATE: 20 POWDER TOPICAL at 21:13

## 2025-04-23 RX ADMIN — Medication 15 MG: at 08:03

## 2025-04-23 RX ADMIN — METFORMIN HYDROCHLORIDE 1000 MG: 500 TABLET, FILM COATED ORAL at 17:59

## 2025-04-23 RX ADMIN — DOCUSATE SODIUM 100 MG: 100 CAPSULE, LIQUID FILLED ORAL at 08:03

## 2025-04-23 RX ADMIN — CLONAZEPAM 1 MG: 0.5 TABLET ORAL at 14:22

## 2025-04-23 RX ADMIN — OLANZAPINE 15 MG: 15 TABLET, FILM COATED ORAL at 21:11

## 2025-04-23 RX ADMIN — CLONAZEPAM 1 MG: 0.5 TABLET ORAL at 21:11

## 2025-04-23 RX ADMIN — LAMOTRIGINE 150 MG: 100 TABLET ORAL at 08:03

## 2025-04-23 RX ADMIN — PRAZOSIN HYDROCHLORIDE 3 MG: 2 CAPSULE ORAL at 21:12

## 2025-04-23 RX ADMIN — CLONAZEPAM 1 MG: 0.5 TABLET ORAL at 08:03

## 2025-04-23 RX ADMIN — DOCUSATE SODIUM 100 MG: 100 CAPSULE, LIQUID FILLED ORAL at 21:11

## 2025-04-23 ASSESSMENT — ACTIVITIES OF DAILY LIVING (ADL)
ADLS_ACUITY_SCORE: 47
ORAL_HYGIENE: INDEPENDENT
ORAL_HYGIENE: INDEPENDENT
ADLS_ACUITY_SCORE: 47
DRESS: INDEPENDENT
ADLS_ACUITY_SCORE: 47
LAUNDRY: WITH SUPERVISION
ADLS_ACUITY_SCORE: 47
HYGIENE/GROOMING: INDEPENDENT
ADLS_ACUITY_SCORE: 47
ADLS_ACUITY_SCORE: 47
HYGIENE/GROOMING: INDEPENDENT
DRESS: INDEPENDENT
ADLS_ACUITY_SCORE: 47

## 2025-04-23 NOTE — PLAN OF CARE
Problem: Depressive Signs/Symptoms  Goal: Improved Mood Symptoms (Depressive Signs/Symptoms)  Intervention: Promote Mood Improvement  Recent Flowsheet Documentation  Taken 4/23/2025 1132 by No Gaona RN  Supportive Measures:   active listening utilized   verbalization of feelings encouraged   self-responsibility promoted  Taken 4/23/2025 1100 by No Gaona RN  Supportive Measures:   active listening utilized   positive reinforcement provided   self-responsibility promoted   verbalization of feelings encouraged  Trust Relationship/Rapport:   care explained   choices provided   emotional support provided   empathic listening provided   questions answered   questions encouraged   reassurance provided   thoughts/feelings acknowledged   Goal Outcome Evaluation:    Plan of Care Reviewed With: patient             The patient has a discharge order for tomorrow.  The patient states she is ready for discharge.  The patient denies thoughts of SIB and denies urges.  Has remained safe.  Took a shower.  Denies pain and is compliant with medications.  The patient is following her care plan.      Will continue with plan of care.

## 2025-04-23 NOTE — PROVIDER NOTIFICATION
04/23/25 0610   Sleep/Rest   Night Time # Hours 6 hours     Pt had a quiet night with no behavioral or safety concerns. Pt was observed sleeping the entire night, no acute events noted or reported.

## 2025-04-23 NOTE — PLAN OF CARE
Group Attendance:  attended full group    Time session began: 1015  Time session ended: 1030  Patient's total time in group: 45    Total # Attendees   3   Group Type psychotherapeutic     Group Topic Covered emotional regulation, symptom management, healthy coping skills, mindfulness, and relaxation and grounding techniques/coping skills     Group Session Detail Group members checked in with how they are feeling. Writer discussed the benefits of using drawing as coping skill, gave members a small piece of paper to start an exercise in doodling. Group members checked in with what they noticed during the activity.      Patient's response to the group topic/interactions:  cooperative with task, socially appropriate, listened actively, expressed understanding of topic, attentive, and actively engaged     Patient Details: Pt attended group and checked in as being excited and nervous to discharge tomorrow. Pt engaged in the activity, but was not very social with peers.            No Charge-less than 3 patients fully attended group  Patient Active Problem List   Diagnosis    Depression    Suicidal ideation    Depression with suicidal ideation    Major depression    Suicidal behavior    Facial cellulitis    Auditory hallucination    Psychosis, unspecified psychosis type (H)    Morbid obesity (H)    Chronic insomnia    Generalized anxiety disorder    COVID-19    Borderline personality disorder (H)    Depression, unspecified depression type

## 2025-04-23 NOTE — PLAN OF CARE
"  Rehab Group    Start time: 11:15  End time: 11:55  Patient time total: 20 minutes    attended partial group    #4 attended   Group Type: OT Clinic   Group Topic Covered: balanced lifestyle, coping skills, healthy leisure time, relaxation , and social skills       Group Session Detail:  Pt actively participated in occupational therapy clinic to facilitate coping skill exploration, creative expression within personally meaningful activities, and clinical observation of social, cognitive, and kinesthetic performance skills.     Patient Response/Contribution:  cooperative with task, organized, socially appropriate, safe use of materials/supplies, attentive, and actively engaged     Patient Detail:  Pt response: Patient arrived to this group late; however, independently selected to continue work on a previously started fuzzy color task. Upon approach, patient expressed that she will be discharging soon and noted feeling \"so-so\", though \"excited\" about this. Patient was independent to initiate, gather materials, sequence, and adjust to workspace demands as needed. Demonstrated good focus, planning, and problem solving for selected coloring task. Patient worked in an organized and goal-oriented manner. Able to ask for assistance as needed, and socialized appropriately with peers and staff when prompted. Patient engaged more socially in this group than in previous groups with this writer and appeared to enjoy talking about a \"nature center\" that she lives by and hopes to visit soon. Patient also brightened when discussing spending time with her sister and laughed when she noted that her and her sister typically have lunch at [employer] and stated \"[employer] is my obsession!\" Patient independently cleaned task supplies prior to leaving group early to meet with a unit provider. Affect appeared blunted, though brightened at times during interactions. Patient remained calm and cooperative during this group.       [id number] OT " Group (2 or more in attendance)      Patient Active Problem List   Diagnosis    Depression    Suicidal ideation    Depression with suicidal ideation    Major depression    Suicidal behavior    Facial cellulitis    Auditory hallucination    Psychosis, unspecified psychosis type (H)    Morbid obesity (H)    Chronic insomnia    Generalized anxiety disorder    COVID-19    Borderline personality disorder (H)    Depression, unspecified depression type

## 2025-04-23 NOTE — PLAN OF CARE
Team Note Due:  Thursday    Assessment/Intervention/Current Symtoms and Care Coordination:  Chart review and met with team, discussed pt progress, symptomology, and response to treatment.  Discussed the discharge plan and any potential impediments to discharge.      Per Team:  Pt will be discharged tomorrow.      I made therapy and psychiatry appointments.  I called the DBT program to restart this and they said it looks like she is going to have to do PHP first but they will confirm and call me back.  MD is putting in the PHP consult.    TCM is here on unit visting pt. I met with them to discuss coordination of care.  I told them I called the [facility] home and Avril and others were shouting with izyad about her returning home tomorrow.    Pt declined referral for [facility] services.    I scheduled cab with [employer] for 11Am tomorrow.      I spoke with the DBT group therapist - Cyn Whatley @[phone]. RN and I gave her a clinical update. However, she declines to make arrangements for her return to the program before discharge. They do not have a PHP there. Cyn wants me to get an GEORGI from the pt so I can send the psychiatric discharge summary to her. Cyn with call the targeted  to coordinate further.      Discharge Plan or Goal:  Return to the [facility] at [facility].      Barriers to Discharge:  Too symptomatic     Referral Status:  Insurance  [facility] and Medicare      Has all established providers      Primary Care Provider:  Name/Clinic:      Number:         Medication Management/Psychiatry:  [facility]  Mental Health Clinic   [address] [place] [address]   [phone]  Betty Cazares, APRN, CNP   [address] [place] [address]   [phone] (Work)   [phone] (Fax)          Therapist:   Name/Clinic: Monse Hannah, [facility]               DBT Program  YANIRA Mckeon  3 days a  week        /ACT Team:  Name/Clinic: Marleny Núñez  [facility]   Number: [phone]  Fax: [phone]  [email or web address].        CADI   [facility]  Camilo Martinez  Assisted Living     [facility]  Name/Clinic: [facility]  Avril Roberto is the supervisor  Number: .[phone]  Pt lives at:  [address] [place] [address]     Legal Status:  There is no [place].  recommitted - but still on the PD from last admission   4/10/25 - C  [facility]: [place]  File Number: New recommittment is   Start April 9, 2025  and   expiration date of May 1, 2026      Contacts (include GEORGI status):          Upcoming Meetings and Dates/Important Information and next steps:      Coordinate transfer back to [facility] home.

## 2025-04-23 NOTE — PLAN OF CARE
0BEH IP Unit Acuity Rating Score (UARS)  Patient is given one point for every criteria they meet.    CRITERIA SCORING   On a 72 hour hold, court hold, committed, stay of commitment, or revocation. 1    Patient LOS on BEH unit exceeds 20 days. 1  LOS: 20   Patient under guardianship, 55+, otherwise medically complex, or under age 11. 0   Suicide ideation without relief of precipitating factors. 0   Current plan for suicide. 0   Current plan for homicide. 0   Imminent risk or actual attempt to seriously harm another without relief of factors precipitating the attempt. 0   Severe dysfunction in daily living (ex: complete neglect for self care, extreme disruption in vegetative function, extreme deterioration in social interactions). 1   Recent (last 7 days) or current physical aggression in the ED or on unit. 0   Restraints or seclusion episode in past 72 hours. 0   Recent (last 7 days) or current verbal aggression, agitation, yelling, etc., while in the ED or unit. 0   Active psychosis. 0   Need for constant or near constant redirection (from leaving, from others, etc).  0   Intrusive or disruptive behaviors. 0   Patient requires 3 or more hours of individualized nursing care per 8-hour shift (i.e. for ADLs, meds, therapeutic interventions). 0   TOTAL 1

## 2025-04-23 NOTE — PLAN OF CARE
"  Rehab Group    Start time: 14:10  End time: 14:55  Patient time total: 35 minutes    attended partial group     #3 attended   Group Type: occupational therapy   Group Topic Covered:  coping skills, mindfulness, and self-esteem, gratitude      Group Session Detail:  Patient engaged in an interactive group with a focus on mindful gratitude. Participants in this group were educated on the benefits of regular gratitude practice and instructed to complete a 30-day gratitude calendar project. Group members were then facilitated in a group discussion on reflections and insights of the activity. Therapeutic benefits of this group include: mindfulness, identification of effective coping skills, sustained attention to task, and promoting healthy social skills.      Patient Response/Contribution:  cooperative with task, organized, socially appropriate, attentive, and actively engaged     Patient Detail:  Patient Response: Patient arrived to this group late. During the initial check-in, patient shared that \"talking to [her] kids\" makes her smile on a tough day. Patient expressed unfamiliarity with gratitude calendars; however, appeared receptive to education provided on the benefits of practicing regular gratitude. Patient demonstrated good focus and attention throughout the activity and worked on her gratitude calendar in a meaningful and purposeful way. Patient appeared comfortable sharing insights with group members and identified that she is grateful for her \"nice smile.\" Patient also identified that she is grateful for \"the fourth of July\" and further noted that this is because it is her birthday. Patient brightened when discussing ways in which she celebrates her birthday every year. Patient shared that completing this activity made her feel \"a release of negativity\" when asked how the activity made her feel and noted that she would like to incorporate regular gratitude into her daily life. Patient listened actively to " peer sharing throughout the group. Affect appeared somewhat blunted; however, patient remained calm and cooperative during this group.       [id number] OT Group (2 or more in attendance)      Patient Active Problem List   Diagnosis    Depression    Suicidal ideation    Depression with suicidal ideation    Major depression    Suicidal behavior    Facial cellulitis    Auditory hallucination    Psychosis, unspecified psychosis type (H)    Morbid obesity (H)    Chronic insomnia    Generalized anxiety disorder    COVID-19    Borderline personality disorder (H)    Depression, unspecified depression type

## 2025-04-23 NOTE — PROGRESS NOTES
"[facility] [place], [place]   Psychiatric Progress Note        Interim History:     The patient's care was discussed with the treatment team during the daily team meeting and/or staff's chart notes were reviewed.  Staff report patient slept for 5 hours. Pt reports urges to self cut but has not cut self for several days now. She also reports hearing voices of an anmol to telling her its time to die. She attends groups although does not interact with peers.     OT Note 4/23/25: \"Patient engaged more socially in this group than in previous groups with this writer and appeared to enjoy talking about a \"nature center\" that she lives by and hopes to visit soon. Patient also brightened when discussing spending time with her sister and laughed when she noted that her and her sister typically have lunch at [employer] and stated \"[employer] is my obsession!\"     Met with patient in the presence of a student in the conference room. Pt was dressed in hospital scrubs. She appeared sad with blunted affect. Pt reports that that the urges to self harm have been less. She further adds that she has found talking to staff helpful. She also reports walking in the hallway has been helpful too. She reports that she still hears voices of the anmol telling her its time to die. She adds that the voices are less frequent. She says she feels ready to discharge. We discussed the importance of frequent DBT sessions and she agreed to attend the sessions once they are set up. Pt denies any current thoughts of self harm and contracts for safety. We discussed and reviewed discharge medications with pt and the medications were sent to the pharmacy.    After visit with the patient I was informed by [facility] that DBT would prefer patient to attend first [facility] before returning to DBT.            Medications:     Current Facility-Administered Medications   Medication Dose Route Frequency Provider Last Rate Last Admin    ARIPiprazole " "(ABILIFY) half-tab 15 mg  15 mg Oral Daily Jd Jones MD   15 mg at 04/23/25 0803    citalopram (celeXA) tablet 30 mg  30 mg Oral Daily Jd Jones MD   30 mg at 04/23/25 0802    clonazePAM (klonoPIN) tablet 1 mg  1 mg Oral TID Nissa Morris APRN CNP   1 mg at 04/23/25 0803    docusate sodium (COLACE) capsule 100 mg  100 mg Oral BID Nissa Morris APRN CNP   100 mg at 04/23/25 0803    lamoTRIgine (LaMICtal) tablet 150 mg  150 mg Oral BID Nissa Morris APRN CNP   150 mg at 04/23/25 0803    levothyroxine (SYNTHROID/LEVOTHROID) half-tab 12.5 mcg  12.5 mcg Oral QAM AC Nissa Morris APRN CNP   12.5 mcg at 04/23/25 0803    metFORMIN (GLUCOPHAGE) tablet 1,000 mg  1,000 mg Oral BID w/meals Nissa Morris APRN CNP   1,000 mg at 04/23/25 0803    miconazole (MICATIN) 2 % powder   Topical BID Jd Jones MD   Given at 04/22/25 2049    OLANZapine (zyPREXA) tablet 15 mg  15 mg Oral At Bedtime Nissa Morris APRN CNP   15 mg at 04/22/25 2048    prazosin (MINIPRESS) capsule 3 mg  3 mg Oral At Bedtime Mary Lou Torrez MD   3 mg at 04/22/25 2049    traZODone (DESYREL) tablet 300 mg  300 mg Oral At Bedtime Nissa Morris APRN CNP   300 mg at 04/22/25 2049          Allergies:   No Known Allergies       Labs:   No results found for this or any previous visit (from the past 24 hours).       Psychiatric Examination:     /84   Pulse 90   Temp 97.9  F (36.6  C)   Resp 18   Ht 1.651 m (5' 5\")   Wt (!) 144.3 kg (318 lb 1.6 oz)   SpO2 95%   BMI 52.93 kg/m    Weight is 318 lbs 1.6 oz  Body mass index is 52.93 kg/m .    Orthostatic Vitals         Most Recent      Sitting Orthostatic /84 04/23 0936    Sitting Orthostatic Pulse (bpm) 71 04/23 0936    Standing Orthostatic /91 04/23 0936    Standing Orthostatic Pulse (bpm) 101 04/23 0936          Appearance: awake, alert, dressed in hospital scrubs, appeared as age stated, and " morbidly obese  Attitude: more cooperative  Eye Contact: fair  Mood: less anxious, sad   Affect:  constricted mobility;  blunted    Speech:  soft, coherent and decreased prosody  Psychomotor Behavior:  no evidence of tardive dyskinesia, dystonia, or tics  Throught Process: linear, concrete  Associations:  no loose associations  Thought Content:  denies active Suicidal ideation. Self injurious behavior with urges to cut self present (has not cut self for the past few days), denies visual hallucinations, Endorses AH, describes them as less frequent, See discussion above.   Insight:  limited, but improving  Judgement:  limited  Oriented to:  time, person, and place  Attention Span and Concentration:  limited  Recent and Remote Memory:  fair     Clinical Global Impressions  First: 7/4 4/4/2025     Most recent:         Precautions:     Behavioral Orders   Procedures    Code 1 - Restrict to Unit    Routine Programming     As clinically indicated    Self Injury Precaution    Status 15     Every 15 minutes.    Suicide precautions: Suicide Risk: HIGH; Clinical rationale to override score: Exhibiting Suicidal/self-harm behaviors or thoughts     Patients on Suicide Precautions should have a Combination Diet ordered that includes a Diet selection(s) AND a Behavioral Tray selection for Safe Tray - with utensils, or Safe Tray - NO utensils       Order Specific Question:   Suicide Risk     Answer:   HIGH     Order Specific Question:   Clinical rationale to override score:     Answer:   Exhibiting Suicidal/self-harm behaviors or thoughts          DIagnoses:     Major Depressive Disorder Recurrent with Psychosis  Borderline Personality Disorder         Plan:     Started Celexa 20 mg Daily and Micatin powder 2% for rash under the breast on 4/7/2025. Zyprexa was decreased on 4/3/2025. We increased Abilify on 4/14. Increased Celexa dose on 4/15/2025. No medication change today 4/23/25. SIO was discontinued on 4/20/25 and NO roommate  order is still in place. Will continue to provide support and structure. Plan is to discharge tomorrow to [facility] if no Self injurious behavior on 4/24. Will put referral for PHP in place. Discharge medications were reviewed and sent to pharmacy.       I was present with PA student who participated in the service and in the documentation of the note. I have verified the history and personally performed the physical exam and medical decision making. I agree with the assessment and plan of care as documented in the note.     Jd Jones MD    Total time spent was 39 minutes. Over 50% of times was spent counseling and coordination of care regarding coping skills, medication and discharge planning.    [facility]iat[facility]

## 2025-04-24 ENCOUNTER — TELEPHONE (OUTPATIENT)
Dept: BEHAVIORAL HEALTH | Facility: CLINIC | Age: 40
End: 2025-04-24
Payer: MEDICARE

## 2025-04-24 VITALS
OXYGEN SATURATION: 97 % | HEIGHT: 65 IN | SYSTOLIC BLOOD PRESSURE: 121 MMHG | HEART RATE: 85 BPM | RESPIRATION RATE: 18 BRPM | BODY MASS INDEX: 48.82 KG/M2 | WEIGHT: 293 LBS | DIASTOLIC BLOOD PRESSURE: 85 MMHG | TEMPERATURE: 98.3 F

## 2025-04-24 PROCEDURE — 250N000013 HC RX MED GY IP 250 OP 250 PS 637: Performed by: PSYCHIATRY & NEUROLOGY

## 2025-04-24 PROCEDURE — 99239 HOSP IP/OBS DSCHRG MGMT >30: CPT | Performed by: PSYCHIATRY & NEUROLOGY

## 2025-04-24 PROCEDURE — 250N000013 HC RX MED GY IP 250 OP 250 PS 637: Performed by: NURSE PRACTITIONER

## 2025-04-24 RX ADMIN — CITALOPRAM HYDROBROMIDE 30 MG: 10 TABLET ORAL at 08:27

## 2025-04-24 RX ADMIN — DOCUSATE SODIUM 100 MG: 100 CAPSULE, LIQUID FILLED ORAL at 08:27

## 2025-04-24 RX ADMIN — CLONAZEPAM 1 MG: 0.5 TABLET ORAL at 08:27

## 2025-04-24 RX ADMIN — LAMOTRIGINE 150 MG: 100 TABLET ORAL at 08:27

## 2025-04-24 RX ADMIN — METFORMIN HYDROCHLORIDE 1000 MG: 500 TABLET, FILM COATED ORAL at 08:27

## 2025-04-24 RX ADMIN — Medication 12.5 MCG: at 07:41

## 2025-04-24 RX ADMIN — Medication 15 MG: at 08:27

## 2025-04-24 ASSESSMENT — ACTIVITIES OF DAILY LIVING (ADL)
ADLS_ACUITY_SCORE: 47

## 2025-04-24 NOTE — DISCHARGE SUMMARY
Psychiatric Discharge Summary    Misty Parham MRN# [id number]   Age: 39 year old YOB: 1985     Date of Admission:  4/3/2025  Date of Discharge:  4/24/2025  Admitting Physician:  Jd Jones MD  Discharge Physician:  Jd Jones MD (Contact: [phone])         Event Leading to Hospitalization:     Auditory hallucinations, Suicidal ideation, Self injurious behavior in context of depression     History of present illness:  Misty Parham is a 39 year old female with a past history notable for major depressive disorder, borderline personality disorder, and generalized anxiety disorder with a history of psychosis who presents to the emergency department for evaluation of suicidal ideation and intense urges to engage in self-injurious behavior.  She had reported symptom intensity to the therapist at her treatment program and was referred to the emergency room for further evaluation.  She was determined to be medically stable and transferred to the [facility] unit for psychiatric assessment.  She is now approaching 23 hours in the emergency department.  On examination, the patient was not able to identify a specific psychosocial stressor as being related to recent symptom worsening.  She did identify worsening auditory hallucinations which often prompted suicidal ideation and urges toward self-injurious behavior.  She characterized this intensity as being severe, often feeling consumed by the content of hallucinations, and finding it difficult to refrain from acting on them.  Thinking about her children has helped to lessen the likelihood of engaging in self-injurious behavior however, as symptoms have intensified, this has become less effective.  Today, she expressed a desire to take a knife and cut deeply into her arm.  Suicidal thoughts are reported to be active.  Auditory hallucinations persist today.  She identified her mood as being depressed however is not currently taking an  antidepressant medication.  She notes recent medication changes involved increasing the dose of lamotrigine and trazodone.  With those adjustments, sleep is slightly improved however she continues to struggle with insomnia.  There was no indication of homicidal ideation.  She is agreeable with her treatment plan.        During visit with this provider:   Pt presented to the conference room with SIO. Pt was dressed in hospital scrubs. Mood appeared sad and depressed. Pt reports that she presented to the ED after cutting her wrist. She stated that she used a plastic knife to cut herself. She further added that cutting her self helps to relieve pain and feels good. She stated that she started cutting herself 3 weeks ago, she added that she started cutting the same area for 3 weeks until it got deeper and started bleeding. She reports that some of her triggers are aggression and yelling. She reports that one staff at her group home had a conflict with a peer at the group and they started yelling at each other and dragged her into it. She shut down and started thinking of suicide by cutting. She endorsed depression and anxiety that has lasted for so long. She also reports hearing voices of angels telling her that it is time to die but when she thinks about her children, she gets confused on whether to die or not. She also reports trouble staying asleep, she keeps waking up at night. Towards the end of the conversation patient was asked if she feels safe and she could not contract for safely because she plans to cut her self, 1:1 SIO was continued. Lab work for yesterday and 4/3/2025 was reviewed.     Shortly after our visit staff reported that pt went to the bathroom and was digging into her wrist wound with a fork.        See Admission note by by admitting physician/nurse-practitioner Jd Jones MD for additional details.          DIagnoses:     Borderline Personality Disorder  Major Depressive Disorder  Recurrent with Psychosis         Labs:      Latest Reference Range & Units 04/02/25 19:02 04/03/25 07:25   Sodium 135 - 145 mmol/L  139   Potassium 3.4 - 5.3 mmol/L  4.5   Chloride 98 - 107 mmol/L  102   Carbon Dioxide (CO2) 22 - 29 mmol/L  27   Urea Nitrogen 6.0 - 20.0 mg/dL  15.7   Creatinine 0.51 - 0.95 mg/dL  0.94   GFR Estimate >60 mL/min/1.73m2  78   Calcium 8.8 - 10.4 mg/dL  9.5   Anion Gap 7 - 15 mmol/L  10   Albumin 3.5 - 5.2 g/dL  3.9   Protein Total 6.4 - 8.3 g/dL  7.1   Alkaline Phosphatase 40 - 150 U/L  75   ALT 0 - 50 U/L  27   AST 0 - 45 U/L  23   Bilirubin Total <=1.2 mg/dL  0.3   Cholesterol <200 mg/dL  133   Glucose 70 - 99 mg/dL  93   HCG Qual Urine Negative  Negative    HDL Cholesterol >=50 mg/dL  39 (L)   Estimated Average Glucose <117 mg/dL  103   Hemoglobin A1C <5.7 %  5.2   LDL Cholesterol Calculated <100 mg/dL  69   Non HDL Cholesterol <130 mg/dL  94   Triglycerides <150 mg/dL  123   TSH 0.30 - 4.20 uIU/mL  1.88   WBC 4.0 - 11.0 10e3/uL  8.2   Hemoglobin 11.7 - 15.7 g/dL  13.1   Hematocrit 35.0 - 47.0 %  40.9   Platelet Count 150 - 450 10e3/uL  292   RBC Count 3.80 - 5.20 10e6/uL  4.67   MCV 78 - 100 fL  88   MCH 26.5 - 33.0 pg  28.1   MCHC 31.5 - 36.5 g/dL  32.0   RDW 10.0 - 15.0 %  14.7   % Neutrophils %  52   % Lymphocytes %  31   % Monocytes %  8   % Eosinophils %  9   % Basophils %  1   % Immature Granulocytes %  0   NRBC/W <1 /100  0   Absolute Neutrophil 1.6 - 8.3 10e3/uL  4.2   Absolute Lymphocytes 0.8 - 5.3 10e3/uL  2.5   Absolute Monocytes 0.0 - 1.3 10e3/uL  0.6   Absolute Eosinophils 0.0 - 0.7 10e3/uL  0.8 (H)   Absolute Basophils 0.0 - 0.2 10e3/uL  0.0   Absolute Immature Granulocytes <=0.4 10e3/uL  0.0   Absolute NRBCs 10e3/uL  0.0   Amphetamine Qual Urine Screen Negative  Screen Negative    Fentanyl Qual Urine Screen Negative  Screen Negative    Cocaine Urine Screen Negative  Screen Negative    Benzodiazepine Urine Screen Negative  Screen Positive !    Opiates Qualitative Urine  Screen Negative  Screen Negative    PCP Urine Screen Negative  Screen Negative    Cannabinoids Qual Urine Screen Negative  Screen Negative    Barbiturates Qual Urine Screen Negative  Screen Negative    (L): Data is abnormally low  (H): Data is abnormally high  !: Data is abnormal         Consults:   Wound care nurse due to recent and repetitive Self injurious behavior, superficial scratches, following recommendations were given (appreciate wound care service help): Left wrist wound(s): Every other day and as needed.   OK to shower with band aid off.   Cleanse with saline and pat dry.   Apply vaseline or aquaphor to area and cover with large band aid.   OK to discontinue when healed.         Hospital Course:     Misty Parham was admitted to [facility] with attending Jd Jones MD as a voluntary patient. The patient was placed under status 15 (15 minute checks) to ensure patient safety. Patient was admitted under committed status. During first days of hospitalization and briefly after words we had to keep her on SIO due to Self injurious behavior and passive Suicidal ideation. Patient reported experiencing frequent urges to harm self and frequently succumbed to them doing superficial scratching, cutting self, often doing that in bathroom, so, we had to instruct SIO staff to keep bathroom door open. Misty proved difficult to treat, especially, because of low motivation to get better and limited insight and judgement. She seemed to have difficulties with accepting responsibility for her behavior and, even, when promised to communicate urges for SIB, frequently got engaged in it without telling staff about it. She reported passive Suicidal ideation and frequent Auditory hallucinations: voices of angels telling her that she should join them and die. Misty at the same time appeared to have some insight that voices were artificial and not real and was able not to follow what they were telling her. She at times  exhibited some gaminess/attention seeking behavior telling staff that she had some object/objects to harm self, but, then, refusing to tell staff where that object/s were. She was treated with antidepressants (Celexa) and mood stabilizers as well as antipsychotics. Gradually, patient's condition improved and for the last 4-5 days of hospitalization she was able to communicate her discomfort to staff and abstain from Self injurious behavior. We contacted outpatient team and they agreed to take Misty back to her group home but informed us that they would prefer Misty to go to [facility] first, before returning to her regular DBT groups. Referral to [facility] was done, however, formal Diagnostic Assessment was not done by the moment of writing this note and was scheduled for an outpatient. During her last visit with this provider today patient reported being in a good mood and was visibly happy to hear that people at her group home were waiting for her to come back. She appeared to be more animated, denied thoughts of harming self or killing self. We specifically discussed her previous thoughts of buying  and harming self with it: today Misty denied having those thoughts and contracted for safety. She thanked this care team for the help provided to her and had no other questions or concerns. Patient's meds were refilled at this facility's discharge pharmacy.      Misty Parham did participate in groups and was visible in the milieu.     The patient's symptoms of depression, anxiety, Self injurious behavior improved.     Misty Parham was released to group home. At the time of discharge Misty Parham was determined to not be a danger to herself or others.          Discharge Medications:     Discharge Medication List as of 4/24/2025 10:14 AM        START taking these medications    Details   ARIPiprazole (ABILIFY) 15 MG tablet Take 1 tablet (15 mg) by mouth daily., Disp-30 tablet, R-1, E-Prescribe      citalopram (CELEXA)  10 MG tablet Take 3 tablets (30 mg) by mouth daily., Disp-90 tablet, R-1, E-Prescribe      polyethylene glycol (MIRALAX) 17 GM/Dose powder Take 17 g by mouth daily as needed for constipation., Disp-510 g, R-0, E-Prescribe           CONTINUE these medications which have CHANGED    Details   docusate sodium (COLACE) 100 MG capsule Take 1 capsule (100 mg) by mouth daily., Disp-60 capsule, R-0, E-Prescribe      hydrOXYzine HCl (ATARAX) 50 MG tablet Take 1 tablet (50 mg) by mouth every 6 hours as needed for itching., Disp-60 tablet, R-1, E-Prescribe      OLANZapine (ZYPREXA) 15 MG tablet Take 1 tablet (15 mg) by mouth at bedtime., Disp-30 tablet, R-1, E-Prescribe      prazosin (MINIPRESS) 1 MG capsule Take 3 capsules (3 mg) by mouth at bedtime., Disp-90 capsule, R-1, E-PrescribeHold for SBP below 100 and DBP below 60.           CONTINUE these medications which have NOT CHANGED    Details   clonazePAM (KLONOPIN) 1 MG tablet Take 1 mg by mouth 3 times daily., Historical      lamoTRIgine (LAMICTAL) 150 MG tablet Take 150 mg by mouth 2 times daily., Historical      levothyroxine (SYNTHROID/LEVOTHROID) 25 MCG tablet Take 0.5 tablets (12.5 mcg) by mouth every morning (before breakfast)., Disp-30 tablet, R-1, E-Prescribe      metFORMIN (GLUCOPHAGE) 1000 MG tablet Take 1 tablet (1,000 mg) by mouth 2 times daily (with meals)., Disp-180 tablet, R-1, E-Prescribe      miconazole (MICATIN) 2 % external powder Apply topically 2 times daily. to rash under breastsDisp-85 g, [id number]      Omega-3 Fish Oil 500 MG capsule Take 1 capsule (500 mg) by mouth daily., Disp-90 capsule, R-1, E-Prescribe      traZODone (DESYREL) 150 MG tablet Take 300 mg by mouth at bedtime., Historical                  Psychiatric Examination:   Appearance:  awake, alert, dressed in hospital scrubs, and moderately obese  Attitude:  cooperative  Eye Contact:  fair  Mood:  anxious and better  Affect:  constricted mobility  Speech:  clear, coherent and  decreased prosody  Psychomotor Behavior:  no evidence of tardive dyskinesia, dystonia, or tics and physical retardation  Thought Process:  linear and goal oriented  Associations:  no loose associations  Thought Content:  no evidence of suicidal ideation or homicidal ideation and no evidence of psychotic thought  Insight:  partial  Judgment:  fair  Oriented to:  time, person, and place  Attention Span and Concentration:  fair  Recent and Remote Memory:  fair  Language: Able to name objects, Able to repeat phrases, and Able to read and write  Fund of Knowledge: low-normal  Muscle Strength and Tone: normal  Gait and Station: Normal         Discharge Plan:         Health Care Follow-up:   You are insured through Medicare and [employer] [facility] plans.  You are on a plan called [employer]. You will need to coordinate your services through [employer]. Member Services: [phone]  You can also get transportation through this number     \They have arranged for a cab ride home for you with [employer] @[phone] who will pick you up at 11AM.     You are returning to your [facility] residential treatment ffacility which is home.  [facility]  Name/Clinic: [facility]  [place]   Felisa is the supervisor  Number: .[phone]  Pt lives at:  [address] [place] [address]           Continue to work with your  to coordinate your care and monitro the terms of your civil commitment.  Name/Clinic: [facility]  [facility]   Number: [phone]  [email or web address]  The Health Unit Coordinator has faxed these discharge instructions fo Fax: [phone]           Return to your telehealth video visit with  your Therapist:  on Tuesday, April 29, 2025 @ 11AM  Monse Ferris LICSW   [facility]  Human Services Building  [facility] [address] [address]  General Information:  [phone]  Appointments: [phone]  Nurse Line: [phone]  If you need to get in earlier, call the nurse line - [phone].  The Health Unit Coordinator has faxed these discharge instructions have been faxed to fax: [phone]      Attend your in-person psychiatric medication management appointment on Monday, May 19, 2025 @ 3PM.  Betty Cazares, APRN, CNP   [address] [place] [address]    [facility]  Human Services [address] [address]  General Information: [phone]  Appointments: [phone]  Nurse Line: [phone]  If you need to get in earlier, call the nurse line - [phone].  The Health Unit Coordinator has faxed these discharge instructions have been faxed to fax: [phone]            You have declined the recommendation for an [facility] worker. You can talk further with your targeted  if you will reconsider this as an option for your recovery plan.        We have been in contact with your DBT Program group therapist - Cyn Whatley @ [phone].  She would like to review the psychiatric discharge summary as they consider whether returning to their program is the best option for you.She gifty also call your  to discuss this. At this time they are recommending that you first complete a PHP program before you are considered for re-admission to the program. See below for this appointment for the PHP intake session.  AGNIESZKA Mckeon  3 days a week  [address]  [address]  [place], [place]  Phone: [phone]  The Health Unit Coordinator has faxed these discharge instructions to fax#:[phone]         Please participate in this video session for your intake appointment for the [facility] Partial Hospitalization Program.  Please check your [employer] for log on instructions.  [facility]  [phone]  May  5 Video Visit  Monday May 5, 2025  11:00 AM  Please Note: This is a virtual visit; there is no need to come to the facility.     Please have a list of all current medications available for appointment.      If you get health care at a clinic, emergency room, or other place in a hospital, you may get more than one bill. For example, you may get a bill(s) from [facility] for facility charges such as testing, medicines and other services but you could also receive a separate bill(s) for professional charges from any doctors or other providers who treated you. Sometimes, one bill will contain both facility and professional charges.               Your CADI  is through [employer].  Camilo Martinez  They are authorizing Assisted Living.        Schedule with your Primary Care Provider as needed.        A consult was ordered for the Partial Hospitalization Program.  Patient Navigation Hub:   [facility] work to be your point-of-contact for trustworthy and compassionate care from Inpatient services to [facility] Programmatic Care. We will provide resources and communication to help guide you into programmatic care. Ultimately, our goal is to be the one-stop-shop of communication, coordination, and support for your journey to programmatic care.  Phone: [phone]             Attestation:  The patient has been seen and evaluated by me,  Jd Jones MD     Total time spent was 39 minutes. Over 50% of times was spent counseling and coordination of care regarding coping skills, medication and discharge planning.

## 2025-04-24 NOTE — TELEPHONE ENCOUNTER
"Pt is a(n) adult (16+ out of HS) Seeking as eval for Adult Mental Health DA for Programmatic Care - Program Preference? No.  Appointment scheduled by:  Other  (no cost estimate)  Caller name:  Unit staff       needed?  NO    Contact information verified/updated: No    If appt is for adult [facility] program location, confirm you have verified the location and address with the patient referring to the template header.  No    [facility]    \"We have scheduled your evaluation. In the event that your insurance coverage comes back as out of network, you may receive a call to cancel your appointment and direct you to your insurance company for in-network coverage.\"    Disclaimer regarding insurance read to patient?  No  Informed patient Rodriguez are for programming that is in person in the [place] area?  No - not speaking directly with patient  Reviewed waitlist option with pt?  Yes and patient placed on wait list      "

## 2025-04-24 NOTE — PLAN OF CARE
Problem: Adult Behavioral Health Plan of Care  Goal: Plan of Care Review  Outcome: Adequate for Care Transition   Goal Outcome Evaluation:                  Discharge orders are received.  Reviewed and discussed discharge instructions, follow up care, future appointments and medication administration.  Patient denies thoughts of SI, SIB or hallucinations.  Verbalized understanding of discharge instructions. Received personal belongings.  All forms are signed.  Patient to discharge to group home.

## 2025-04-24 NOTE — PLAN OF CARE
"Problem: Depressive Signs/Symptoms  Goal: Improved Mood Symptoms (Depressive Signs/Symptoms)     Goal Outcome Evaluation:    Plan of Care Reviewed With: patient        Patient continues to isolate to her room, visible in the milieu only for meals and snacks. Patient did not attend group session, presents with a flat/blunted affect. Mood perceived as depressed. However, patient reports feeling better this shift and had rated anxiety 5/10, depression 4/10. Patient continues to report auditory hallucination as \"A whisper.\" Patient denied having any urge to self harm, contracts for safety. Patient is medication compliant. Appetite and fluid intake are adequate. Patient denies pain/physical discomfort. No medication side effects reported or observed this shift. Hygiene is fair. No bowel/bladder concerns reported. Anticipate discharge tomorrow. Cab will  around 11am.               "

## 2025-04-24 NOTE — PLAN OF CARE
Problem: Adult Behavioral Health Plan of Care  Goal: Plan of Care Review  Outcome: Progressing   NOC Shift Report    Pt in bed at beginning of shift, breathing quiet and unlabored. Pt slept through shift. Pt slept 6.75 hours.     No pt complaints or concerns at this time.     No PRNs given. Will continue to monitor.

## 2025-05-05 ENCOUNTER — TELEPHONE (OUTPATIENT)
Dept: BEHAVIORAL HEALTH | Facility: CLINIC | Age: 40
End: 2025-05-05
Payer: MEDICARE

## 2025-05-05 ENCOUNTER — VIRTUAL VISIT (OUTPATIENT)
Dept: BEHAVIORAL HEALTH | Facility: CLINIC | Age: 40
End: 2025-05-05
Payer: MEDICARE

## 2025-05-05 DIAGNOSIS — F60.3 BORDERLINE PERSONALITY DISORDER (H): ICD-10-CM

## 2025-05-05 DIAGNOSIS — F33.2 MAJOR DEPRESSIVE DISORDER, RECURRENT EPISODE, SEVERE WITH ANXIOUS DISTRESS (H): Primary | ICD-10-CM

## 2025-05-05 ASSESSMENT — COLUMBIA-SUICIDE SEVERITY RATING SCALE - C-SSRS
1. IN THE PAST MONTH, HAVE YOU WISHED YOU WERE DEAD OR WISHED YOU COULD GO TO SLEEP AND NOT WAKE UP?: YES
3. HAVE YOU BEEN THINKING ABOUT HOW YOU MIGHT KILL YOURSELF?: YES
2. HAVE YOU ACTUALLY HAD ANY THOUGHTS OF KILLING YOURSELF IN THE PAST MONTH?: YES
5. HAVE YOU STARTED TO WORK OUT OR WORKED OUT THE DETAILS OF HOW TO KILL YOURSELF? DO YOU INTEND TO CARRY OUT THIS PLAN?: YES
4. HAVE YOU HAD THESE THOUGHTS AND HAD SOME INTENTION OF ACTING ON THEM?: NO

## 2025-05-05 ASSESSMENT — ANXIETY QUESTIONNAIRES
3. WORRYING TOO MUCH ABOUT DIFFERENT THINGS: NEARLY EVERY DAY
7. FEELING AFRAID AS IF SOMETHING AWFUL MIGHT HAPPEN: NOT AT ALL
4. TROUBLE RELAXING: MORE THAN HALF THE DAYS
7. FEELING AFRAID AS IF SOMETHING AWFUL MIGHT HAPPEN: NOT AT ALL
GAD7 TOTAL SCORE: 10
6. BECOMING EASILY ANNOYED OR IRRITABLE: NOT AT ALL
GAD7 TOTAL SCORE: 10
GAD7 TOTAL SCORE: 10
2. NOT BEING ABLE TO STOP OR CONTROL WORRYING: NEARLY EVERY DAY
8. IF YOU CHECKED OFF ANY PROBLEMS, HOW DIFFICULT HAVE THESE MADE IT FOR YOU TO DO YOUR WORK, TAKE CARE OF THINGS AT HOME, OR GET ALONG WITH OTHER PEOPLE?: VERY DIFFICULT
5. BEING SO RESTLESS THAT IT IS HARD TO SIT STILL: NOT AT ALL
IF YOU CHECKED OFF ANY PROBLEMS ON THIS QUESTIONNAIRE, HOW DIFFICULT HAVE THESE PROBLEMS MADE IT FOR YOU TO DO YOUR WORK, TAKE CARE OF THINGS AT HOME, OR GET ALONG WITH OTHER PEOPLE: VERY DIFFICULT
1. FEELING NERVOUS, ANXIOUS, OR ON EDGE: MORE THAN HALF THE DAYS

## 2025-05-05 ASSESSMENT — PATIENT HEALTH QUESTIONNAIRE - PHQ9
10. IF YOU CHECKED OFF ANY PROBLEMS, HOW DIFFICULT HAVE THESE PROBLEMS MADE IT FOR YOU TO DO YOUR WORK, TAKE CARE OF THINGS AT HOME, OR GET ALONG WITH OTHER PEOPLE: SOMEWHAT DIFFICULT
SUM OF ALL RESPONSES TO PHQ QUESTIONS 1-9: 18
SUM OF ALL RESPONSES TO PHQ QUESTIONS 1-9: 18

## 2025-05-05 NOTE — PROGRESS NOTES
Answers submitted by the patient for this visit:  Patient Health Questionnaire (Submitted on 2025)  If you checked off any problems, how difficult have these problems made it for you to do your work, take care of things at home, or get along with other people?: Somewhat difficult  PHQ9 TOTAL SCORE: 18      [facility]    PATIENT'S NAME: Misty Parham  PREFERRED NAME: Misty  PRONOUNS: Mckenzie Rincon  MRN: [id number]  : 1985  ADDRESS: [address] [place] [address] [place] [address]  [facility]:  [id number]  DATE OF SERVICE: 25  START TIME: 1130  END TIME: 1230  PREFERRED PHONE: [phone]  May we leave a program related message: No  EMERGENCY CONTACT: was obtained (sister)  SERVICE MODALITY:  Video Visit:      Provider verified identity through the following two step process.  Patient provided:  Patient     Telemedicine Visit: The patient's condition can be safely assessed and treated via synchronous audio and visual telemedicine encounter.      Reason for Telemedicine Visit: Patient has requested telehealth visit    Originating Site (Patient Location): Patient's home    Distant Site (Provider Location): Provider Remote Setting- Home Office    Consent:  The patient/guardian has verbally consented to: the potential risks and benefits of telemedicine (video visit) versus in person care; bill my insurance or make self-payment for services provided; and responsibility for payment of non-covered services.     Patient would like the video invitation sent by:  Send to e-mail at: [email or web address]    Mode of Communication:  [facility] Conference [facility]    Distant Location (Provider):  Off-site    As the provider I attest to compliance with applicable laws and regulations related to telemedicine.    UNIVERSAL ADULT Mental Health DIAGNOSTIC ASSESSMENT    Identifying Information:  Patient is a 39 year old,   female individual.  Patient was referred for an assessment by  [facility]   "where she was a patient from 4/34/3/25 until 4/24/25  due to  passive suicidal ideation and frequent auditory hallucinations voices of angels telling her that she should join them and die. Patient attended the session alone.    Chief Complaint:   The reason for seeking services at this time is: \"because of wanting to decrease my self harm.\"  The problem(s) began wanting to self harm beginning in middle school.  Pt. stated, \"I don't want to die, I just want get rid of my pain.\"  Thoughts of cutting had worsened in March went stressors increased.     Those stressors included her son needing money for a phone,  issues with her insurance  and her sons father not wanting to assist with helping the boys out financially.  Patient stated,  that  there was a past suicidal gesture  a few years ago when she was going through a divorce where she attempted to overdose on Tylenol PM. She then had been hospitalized for that occurrence at [facility].   Patient has attempted to resolve these concerns in the past through individual Therapy,  recently participating in DBT 3 times per week in for the last month up until this last hospitalization in April 2025.   According to medical record,  patient's county   is requesting that patient participate in PHP before returning back to DBT group.    is also being followed for med management through [facility].                      CADI services.                                                                                                                                Current Therapist is:                                                        (Monse Ferris) [facility]. Monse Ferris, HAIM   [facility]  Human Services Building  [address] [place] [id number]  General Information: [phone]  Appointments: [phone]  Nurse Line: [phone]    Betty Cazares, APRN, CNP   [address] [place] [address]  " "  [facility] [place]  Human Services Building  [facility] [address]  General Information: [phone]  Appointments: [phone]  Nurse Line: [phone]    We have been in contact with your DBT Program group therapist - Cyn Chacorta @ [phone].     [facility]  3 days a week  [facility]  [address]  [address]  [place], [place]  Phone: [phone]  The Health Unit Coordinator has faxed these discharge instructions to fax#:[phone]       Social/Family History:  Patient reported they grew up in [place], [place].  They were raised by Bio parents till they  when she was 4 yrs. Old. then lived with Mom. Was able to see Dad. Parents are alive and well.   Patient reported that their childhood was \"growing up was Ok.\" She contributes her need to cut as a result  of being bulled as a child. Patient described their current relationships with family of origin as \"pretty good.\" Has 2 sisters and one half sister.      The patient describes their cultural background as American.  Cultural influences and impact on patient's life structure, values, norms, and healthcare: None indicated.  Contextual influences on patient's health include: Individual Factors  ongoing mental health issues .    These factors will be addressed in the Preliminary Treatment plan. Patient identified their preferred language to be english. Patient reported they does not need the assistance of an  or other support involved in therapy.     Patient reported experienced significant delays in developmental tasks, such as reading, helping complete forms.    Patient's highest education level was high school.  Patient identified the following learning problems: concentration, reading, and writing.  Modifications will be used to assist communication in therapy.  Patient reports they are  able to understand written materials.    Patient reported the following relationship history. "  Patient's current relationship status is  for a couple of years. Has two sons' ages 22 and 18. (Living with her sister.) And has a 27 yr. Old step-daughter although, hasn't seen her for 4 yrs.  Patient identified their sexual orientation as heterosexual. Patient identified sister,  and Therapist as part of their support system.  Patient identified the quality of these relationships as good.      Patient's current living/housing situation involves living at [facility] group home since Sept. 2024.  Lives with 3 other females in the group home and they report that housing is stable.    Patient reports their finances are obtained through  MA and Medicare disability. Patient  does not identify finances as a current stressor.      Patient reported that they been involved with the legal system. Is currently on a commitment through [employer] (Recently re-committed until 2026. (See above for CM info.  and referred to media section of chart.)    Patient's Strengths and Limitations:  Patient identified the following strengths or resources that will help them succeed in treatment: . Things that may interfere with the patient's success in treatment include:  urges to cut if had the opportunity to have a item available to her.       Assessments:  The following assessments were completed by patient for this visit:  PHQ9:       8/15/2022    10:45 AM 8/23/2023    10:32 AM 3/13/2024     4:50 PM 4/5/2024     3:33 PM 8/27/2024    10:51 AM 12/20/2024     9:56 AM 5/5/2025    11:29 AM   PHQ-9 SCORE   PHQ-9 Total Score [facility] 22 (Severe depression) 12 (Moderate depression) 27 (Severe depression) 19 (Moderately severe depression) 12 (Moderate depression) 20 (Severe depression) 18 (Moderately severe depression)   PHQ-9 Total Score 22 4 27 19 12 20  18        Patient-reported     GAD2:       5/5/2025    12:31 PM   IVY-2   Feeling nervous, anxious, or on edge 2    Not being able to stop or control worrying 3     IVY-2 Total Score 5        Proxy-reported     GAD7:       6/25/2021    10:58 AM 4/5/2024     3:31 PM 5/5/2025    12:31 PM   IVY-7 SCORE   Total Score  15 (severe anxiety) 10 (moderate anxiety)   Total Score 13 15 10        Proxy-reported     PROMIS 10-Global Health (all questions and answers displayed):       5/5/2025    12:00 PM   PROMIS 10   In general, would you say your health is: 2   In general, would you say your quality of life is: 2   In general, how would you rate your physical health? 3   In general, how would you rate your mental health, including your mood and your ability to think? 1   In general, how would you rate your satisfaction with your social activities and relationships? 3   In general, please rate how well you carry out your usual social activities and roles. (This includes activities at home, at work and in your community, and responsibilities as a parent, child, spouse, employee, friend, etc.) 4   To what extent are you able to carry out your everyday physical activities such as walking, climbing stairs, carrying groceries, or moving a chair? 4   In the past 7 days, how often have you been bothered by emotional problems such as feeling anxious, depressed, or irritable? 3   In the past 7 days, how would you rate your fatigue on average? 3   In the past 7 days, how would you rate your pain on average, where 0 means no pain, and 10 means worst imaginable pain? 1   Global Mental Health Score 9   Global Physical Health Score 14   PROMIS TOTAL - SUBSCORES 23     Gillespie Suicide Severity Rating Scale (Lifetime/Recent)      12/27/2024     5:49 PM 4/1/2025     3:42 PM 4/1/2025     6:10 PM 4/1/2025     6:19 PM 4/1/2025     6:30 PM 4/3/2025     2:00 AM 5/5/2025    12:00 PM   Gillespie Suicide Severity Rating (Lifetime/Recent)   Q1 Wish to be Dead (Lifetime)   Yes   Yes    Comments      my angels telling me to kill myself    Q2 Non-Specific Active Suicidal Thoughts (Lifetime)   Yes   Yes    Most  Severe Ideation Rating (Lifetime)      5    Most Severe Ideation Description (Lifetime)      recent self cut    Frequency (Lifetime)      5    Duration (Lifetime)      5    Controllability (Lifetime)      5    Protective Factors  (Lifetime)      2    Reasons for Ideation (Lifetime)      NA    Q1 Wished to be Dead (Past Month) 1-->yes 0-->no  1-->yes 1-->yes 1-->yes 1-->yes   Q2 Suicidal Thoughts (Past Month) 1-->yes 0-->no  1-->yes 1-->yes 1-->yes 1-->yes   Q3 Suicidal Thought Method 1-->yes   1-->yes 1-->yes 1-->yes 1-->yes   Q4 Suicidal Intent without Specific Plan 1-->yes   0-->no 1-->yes 0-->no 0-->no   Q5 Suicide Intent with Specific Plan 1-->yes   0-->no 1-->yes 1-->yes 1-->yes   Q6 Suicide Behavior (Lifetime) 1-->yes 0-->no 1-->yes  1-->yes     If yes to Q6, within past 3 months? 0-->no    0-->no     Level of Risk per Screen high risk no risks indicated  moderate risk high risk high risk high risk   3. Active Suicidal Ideation with any Methods (Not Plan) Without Intent to Act (Lifetime)   N       4. Active Suicidal Ideation with Some Intent to Act, Without Specific Plan (Lifetime)   Y       5. Active Suicidal Ideation with Specific Plan and Intent (Lifetime)   Y       Most Severe Ideation Rating (Lifetime)   5       Most Severe Ideation Rating (Past 1 Month)    5      Frequency (Lifetime)   5       Frequency (Past 1 Month)    4      Duration (Lifetime)   5       Duration (Past 1 Month)    3      Controllability (Lifetime)   5       Controllability (Past 1 Month)    5      Deterrents (Lifetime)   4       Deterrents (Past 1 Month)    2      Reasons for Ideation (Lifetime)   4       Reasons for Ideation (Past 1 Month)    5      Actual Attempt (Lifetime)   Y       Total Number of Actual Attempts (Lifetime)   2       Actual Attempt (Past 3 Months)    N      Has subject engaged in non-suicidal self-injurious behavior? (Lifetime)   Y       Has subject engaged in non-suicidal self-injurious behavior? (Past 3 Months)     Y      Interrupted Attempts (Past 3 Months)    N      Aborted or Self-Interrupted Attempt (Lifetime)   N       Aborted or Self-Interrupted Attempt (Past 3 Months)    N      Preparatory Acts or Behavior (Lifetime)   Y       Total Number of Preparatory Acts (Lifetime)   3       Preparatory Acts or Behavior Description (Lifetime)   Searching for medication, searching/saving up money for a knife, writing goodbye messages in the past       Preparatory Acts or Behavior (Past 3 Months)    Y      Total Number of Preparatory Acts (Past 3 Months)    1      Preparatory Acts or Behavior Description (Past 3 Months)    Searching for a knife/something sharp in the group home      Calculated C-SSRS Risk Score (Lifetime/Recent)   Moderate Risk High Risk          Personal and Family Medical History:  Patient does report a family history of mental health concerns.   Stated, that her mother   Had depression.  Patient reports family history includes Diabetes in her father.     Patient does report Mental Health Diagnosis and/or Treatment.  Patient reported the following previous diagnoses which include(s): Depression with Suicidal ideation, Major Depression, Psychosis, unspecified, psychosis type, Borderline Personality Disorder and IVY.  Patient reported symptoms began around middle school.  Patient has received mental health services in the past:  individual therapy  and med management through [facility].  As well as,   participating in DBT up until  recent hospitalization.  Psychiatric Hospitalizations: [facility] (Psych Unit)  discharge on 4/24/25. And prior at [facility]  a few years ago after a suicide attempt.  Patient reports they are currently under a civil commitment (see info above.)  She is her own decision maker.         Patient has had a physical exam to rule out medical causes for current symptoms.  Date of last physical exam was within the past year. Symptoms have developed since last physical exam and client was encouraged to follow  up with PCP.   The patient has a [place] [facility] Provider, who is [facility] [place] Cinthya Barren.  Patient reports no current medical concerns.  Patient denies any issues with pain.   There are not significant appetite / nutritional concerns / weight changes.   Patient does not report a history of head injury / trauma / cognitive impairment.        Current Outpatient Medications   Medication Sig Dispense Refill    ARIPiprazole (ABILIFY) 15 MG tablet Take 1 tablet (15 mg) by mouth daily. 30 tablet 1    citalopram (CELEXA) 10 MG tablet Take 3 tablets (30 mg) by mouth daily. 90 tablet 1    clonazePAM (KLONOPIN) 1 MG tablet Take 1 mg by mouth 3 times daily.      docusate sodium (COLACE) 100 MG capsule Take 1 capsule (100 mg) by mouth daily. 60 capsule 0    hydrOXYzine HCl (ATARAX) 50 MG tablet Take 1 tablet (50 mg) by mouth every 6 hours as needed for itching. 60 tablet 1    lamoTRIgine (LAMICTAL) 150 MG tablet Take 150 mg by mouth 2 times daily.      levothyroxine (SYNTHROID/LEVOTHROID) 25 MCG tablet Take 0.5 tablets (12.5 mcg) by mouth every morning (before breakfast). 30 tablet 1    metFORMIN (GLUCOPHAGE) 1000 MG tablet Take 1 tablet (1,000 mg) by mouth 2 times daily (with meals). 180 tablet 1    miconazole (MICATIN) 2 % external powder Apply topically 2 times daily. to rash under breasts 85 g 0    OLANZapine (ZYPREXA) 15 MG tablet Take 1 tablet (15 mg) by mouth at bedtime. 30 tablet 1    Omega-3 Fish Oil 500 MG capsule Take 1 capsule (500 mg) by mouth daily. 90 capsule 1    polyethylene glycol (MIRALAX) 17 GM/Dose powder Take 17 g by mouth daily as needed for constipation. 510 g 0    prazosin (MINIPRESS) 1 MG capsule Take 3 capsules (3 mg) by mouth at bedtime. 90 capsule 1    traZODone (DESYREL) 150 MG tablet Take 300 mg by mouth at bedtime.       No current facility-administered medications for this visit.       Medication Adherence:  Patient reports   taking psychiatric medications as  "prescribed.    Patient Allergies:  No Known Allergies    Medical History:    Past Medical History:   Diagnosis Date    Depressive disorder     Generalized anxiety disorder 5/15/2020         Current Mental Status Exam:   Appearance:  Appropriate    Eye Contact:  Good   Psychomotor:  Normal       Gait / station:  no problem  Attitude / Demeanor: Cooperative   Speech      Rate / Production: Normal/ Responsive      Volume:  Normal  volume      Language:  intact  Mood:   Sad   Affect:   Appropriate    Thought Content: Clear   Thought Process: Coherent    Stated that there are at times voices telling her to harm herself although \"they have all quieted down right now.\"      Associations: No loosening of associations  Insight:   Good  and Fair   Judgment:  Intact   Orientation:  All  Attention/concentration: Good    Substance Use:   Patient did report a family history of substance use concerns; see medical history section for details.  Patient has not received chemical dependency treatment in the past.  Patient has not ever been to detox.      Patient is not currently receiving any chemical dependency treatment.           Substance History of use Age of first use Date of last use     Pattern and duration of use (include amounts and frequency)   Alcohol none     REPORTS SUBSTANCE USE: N/A   Cannabis   none     REPORTS SUBSTANCE USE: N/A     Amphetamines   none     REPORTS SUBSTANCE USE: N/A   Cocaine/crack    none       REPORTS SUBSTANCE USE: N/A   Hallucinogens none       REPORTS SUBSTANCE USE: N/A   Inhalants    None       REPORTS SUBSTANCE USE: N/A   Heroin    None       REPORTS SUBSTANCE USE: N/A   Other Opiates   None     REPORTS SUBSTANCE USE: N/A   Benzodiazepine     According to medical record is showing positive     REPORTS SUBSTANCE USE: N/A   Barbiturates   None     REPORTS SUBSTANCE USE: N/A   Over the counter meds   None     REPORTS SUBSTANCE USE: N/A   Caffeine   None     REPORTS SUBSTANCE USE: N/A   Nicotine   "  None     REPORTS SUBSTANCE USE: N/A   Other substances not listed above:  Identify:    None     REPORTS SUBSTANCE USE: N/A     Patient reported the following problems as a result of their substance use: No use.    Substance Use: No symptoms    Based on the CAGE score of 0 and clinical interview there  are not indications of drug or alcohol abuse.    Significant Losses / Trauma / Abuse / Neglect Issues:   Patient did not serve in the .  There are not indications or report of significant loss, trauma, abuse or neglect issues related to: divorce / relational changes   .  Patient has not been a victim of exploitation.  Concerns for possible neglect are not present.     Safety Assessment:   Patient denies current or past homicidal ideation and behaviors.  Patient reports current/recent suicide ideation, plans, intent, or attempts.    See C-SSRS for more detail and safety plan below.  Patient reports current self-injurious behaviors.  Onset: for years. Have increased since March 2025, frequency: all the time, duration: continual, intensity: severe (cannot have access to knives or sharp objects.  Client reports they are not currently engaging in self-injurious behaivor..   Sharp items are locked up at the group home.  Patient denied risk behaviors associated with substance use.  Patient denies any high risk behaviors associated with mental health symptoms.  Patient denied current or past personal safety concerns.    Patient denies past of current/recent assaultive behaviors.    Patient denied a history of sexual assault behaviors.     Patient reports there are not firearms in the house.    Patient reports the following protective factors: identifies reason for living her two sons.    Risk Plan:  See Recommendations for Safety and Risk Management Plan    Review of Symptoms per patient report:   Depression: Lack of interest or pleasure in doing things, Feeling sad, down, or depressed, Feelings of hopelessness,  Change in energy level, Change in sleep, Low self-worth, Difficulties concentrating, Excessive or inappropriate guilt, Feelings of helplessness, and Ruminations  Trixie:  No Symptoms  Psychosis: No Symptoms  Anxiety: Excessive worry and Ruminations  Panic:  No symptoms  Post Traumatic Stress Disorder:  No Symptoms   Eating Disorder: No Symptoms  ADD / ADHD:  No symptoms  Conduct Disorder: No symptoms  Autism Spectrum Disorder: No symptoms  Obsessive Compulsive Disorder: No Symptoms  Personality Disorders:  not assessed    Patient reports the following compulsive behaviors and treatment history: None.      Diagnostic Criteria:   Major Depressive Disorder  A) Recurrent episode(s) - symptoms have been present during the same 2-week period and represent a change from previous functioning 5 or more symptoms (required for diagnosis)   - Diminished interest or pleasure in all, or almost all, activities.    - Increased sleep.    - Fatigue or loss of energy.    - Feelings of worthlessness or inappropriate and excessive guilt.    - Recurrent thoughts of death (not just fear of dying), recurrent suicidal ideation without a specific plan, or a suicide attempt or a specific plan for committing suicide.   B) The symptoms cause clinically significant distress or impairment in social, occupational, or other important areas of functioning Borderline Personality Disorder    - Frantic efforts to avoid real or imagined abandonment. Note: Do not include suicidal or self-mutilating behavior covered in Criterion 5. Symptoms began  year(s) ago and occurs 6 days per week and is experienced as moderate.   - Recurrent suicidal behavior, gestures, or threats, or self-mutilating behavior.   - Chronic feelings of emptiness.    Functional Status:  Patient reports the following functional impairments:  relationship(s).     Adult  Programmatic care:  Current LOCUS was assigned and patient needs the following level of care based on score 20.    1.  Does the patient have a history of vulnerability such as being teased, picked on, or other indications of potential safety issues with other residents?  No    2. Does this patient have a history of being the victim of abuse? No history of abuse reported or documented.    3. Does this patient have a history of victimizing others? No     4. Does the patient have a history of boundary violations?  No.    5. Does the patient have a history of other sexual acting out behaviors (e.g grooming)?   No    6. Does the patient have a history of threats to self or others? Fire setting, running away or other self-injurious behaviors?    No    7. Does the patient s history indicate the need for special precautions or particular staffing patterns in the facility?  Yes: self harm by cutting. Even when using plastic utensils.      NOTE: If this screening indicates that the patient is at risk to harm self or others, notify staff at referral location.    Clinical Summary:  1. Psychosocial Factors:   Currently on a commitment.  Cultural and Contextual Factors:  none indicated  2. Principal DSM5 Diagnoses  (Sustained by DSM5 Criteria Listed Above):  296.33 (F33.2)   Major depressive disorder, recurrent episode, severe with anxious distress.   (F60.3) Borderline Personality Disorder  3. Other Diagnoses that is relevant to services:   None indicated.  4. Provisional Diagnosis:  None indicated .  5. Prognosis: Relieve Acute Symptoms.  6. Likely consequences of symptoms if not treated:  continue self-harm if not treated.  7. Patient strengths include:  has a previous history of therapy .     Recommendations:     1. Plan for Safety and Risk Management:   Safety and Risk: A safety and risk management plan has been developed including: Patient consented to co-developed safety plan on 4/24/25.  Safety and risk management plan was reviewed.   Patient agreed to use safety plan should any safety concerns arise.  A copy was made available to the  patient.        Report to child / adult protection services was NA.     2. Patient's identified None indicated    3. Initial Treatment will focus on:    Depressed Mood -    Risk Management / Safety Concerns related to: Self-harm ideation.     4. Resources/Service Plan:    services are not indicated.   Modifications to assist communication are not indicated.   Additional disability accommodations are not indicated.      5. Collaboration:   Collaboration / coordination of treatment will be initiated with the following  support professionals: commitment , outpatient therapist, and psychiatry.      6.  Referrals:   The following referral(s) will be initiated: Partial Hospitalization Program.       A Release of Information has been obtained for the following: commitment , Dialectical Behavior Therapy (DBT) provider, outpatient therapist, and psychiatry.    Clinical Substantiation/medical necessity for the above recommendations:   Patient is a 39 year old,   female individual.  Patient was referred for an assessment by [facility]  where she was a patient from 4/34/3/25 until 4/24/25  due to  passive suicidal ideation and frequent auditory hallucinations voices of angels telling her that she should join them and die. Patient attended the session alone.   patient stated that she continues to have passive thoughts of cutting herself if she had any sharp objects available to her.   Patient currently resides in a group home.   Is currently on a commitment until 2026.   Patient is her own decision maker.   Patient does have a primary therapist and psychiatric nurse practitioner through [facility].  have been participating in a DBT program for 1 month up until patient verbalized intentions of wanting to self-harm was then taken to the M path unit where she was there for approximately 1 month.  According to the medical record, patient's county  is requesting that patient  participate in PHP before returning back to DBT group. Patient meets the criteria for PHP based on the Locus score. Information was gathered by  reviewing medical records and by information from patient. Patient will benefit from PHP to reduce the chronicity and intensity of symptoms which have impacted daily functioning.       7. LAYA:    LAYA:  Discussed the general effects of drugs and alcohol on health and well-being. Provider gave patient printed information about the  effects of chemical use on their health and well being. Recommendations:  None indicated.      8. Records:   These were reviewed at time of assessment.   Information in this assessment was obtained from the medical record and  provided by patient who is a good and fair historian.    Patient will have open access to their mental health medical record.    9.   Interactive Complexity: No    10. Safety Plan:   Cindy Safety Plan      Creation Date: 12/27/24 Last Update Date: 4/21/25      Step 1: Warning signs:    Warning Signs    SI, urges for SIB    triggers- voices, angels time to die, with stress louder, Satan trying to trick me    trouble sleeping    financial worries    kids are now over 18 and they are not able to be on her insurance      Step 2: Internal coping strategies - Things I can do to take my mind off my problems without contacting another person:    Strategies    listen to music    coloring    walks and walker    TIPP skills    reminding myself of boundaries and self care    learning and reciting Tunisian language Slovenian    self compassion and future goals      Step 3: People and social settings that provide distraction:    Name Contact Information    mom and sister- Abdullahi dao [phone], Sister Petra is [phone]    Sons- Maile age 22, Yamil age 18 [phone], [phone]    Dad is Earnest [phone]    Lor is friend [phone]       Places    outings to dollar tree      Step 4: People whom I can ask for help  "during a crisis:    Name Contact Information     staff- Trent [phone]    therapist- Monse     Psychiatrist - Melva?     mom- Mona [phone]    sister- Petra [phone]    Group Home [phone]      Step 5: Professionals or agencies I can contact during a crisis:    Clinician/Agency Name Phone Emergency Contact    [facility] [phone]     DBT Group        Local Emergency Department Emergency Department Address Emergency Department Phone    [place] [phone]    EMPRADHA  [phone]      Suicide Prevention Lifeline Phone: Call or Text 713  Crisis Text Line: Text HOME to 843556     Step 6: Making the environment safer (plan for lethal means safety):   Meds are secured at group home. Staff administer meds and supervise Pt taking meds.  Locking up sharps  Sister helping with finances and boundaries areound money     Optional: What is most important to me and worth living for?:   My kids  self compassion and future goals, healthy boundaries    Crisis Resources:   Crisis Intervention: 373.481.8690 or 114-831-4392 (TTY: 411.704.1526). Call anytime for help.  National [place] on Mental Illness (www.[place].davian.org): 972.855.5546 or 613-657-2950.  Suicide Awareness Voices of Education (SAVE) (www.save.org): 338-282-OLTX (8983)   National Suicide Prevention Line (www.mentalhealthmn.org): 034-959-FQIE (5744)  Mental Health Consumer/Survivor Network of [place] (www.mhcsn.net): 264.473.6864 or 631-932-2529  Mental Health Association of [place] (www.mentalhealth.org): 791.338.7308 or 777-168-1506  Self- Management and Recovery Training., SMART-- Toll free: 483.300.7885 www.[employer].org  [facility] Crisis Response 663 952-3392  [facility] Crisis Response 427-309-4427  [facility] Crisis Response 282-362-4231  [facility] Crisis (COPE) Response - Adult 678 299-1092  [facility] Crisis Response - Adult 084 352-9963  Text 4 Life: txt \"LIFE\" to 25626 for immediate " "support and crisis intervention  Crisis text line: Text \"[place]\" to 265572. Free, confidential, .  Crisis Intervention: 776.321.7758 or 049-749-3939. Call anytime for help.  Warm Line 9 am-9 pm 592-320-8732       Cindy Safety Plan. Muriel Smiley and Srinivas Culp. Used with permission of the authors.          Provider Name/ Credentials:    Qi WHITMORE  May 5, 2025    LOCUS Worksheet     Name: Misty Parham MRN: [id number]    : 1985      Gender:  female    PMI:  N/a   Provider Name: Qi WHITMORE   Provider NPI:  [id number]    Actual level of Care Provided:    Outpatient mental health    Service(s) receiving or referred to:   PHP program    Reason for Variance: N/A      Rating completed by: Qi WHITMORE      I. Risk of Harm:   3      Moderate Risk of Harm    II. Functional Status:   3      Moderate Impairment    III. Co-Morbidity:   4      Major Co-Morbidity    IV - A. Recovery Environment - Level of Stress:   3      Moderately Stress Environment    IV - B. Recovery Environment - Level of Support:   2      Supportive Environment    V. Treatment and Recovery History:   3      Moderate to Equivocal Response to Treatment and Recovery Management    VI. Engagement and Recovery Project:   2      Positive Engagement and Recovery       20 Composite Score    Level of Care Recommendation:   20 to 22       Medically Monitored Non-Residential Services        "

## 2025-05-05 NOTE — TELEPHONE ENCOUNTER
Summary of Patient Care Communication Handoff to Patient Navigator Coordinator    PATIENT'S NAME: Misty Parham  MRN:   [id number]  :   1985    DATE OF SERVICE: 25    Referral Needed: Yes    Is the patient coming from an inpatient unit?  No    What program is this referral for? Adult Mental Health Referral     Level of Care Recommended:  Partial Hospitalization Program (PHP)     Specialty Track Recommendations:  Trauma/Personality Disorder     Schedule Preferences: No Schedule Preference (Mornings or Afternoons)     Are there any potential barriers for entrance into programmatic care? self-injurious behavior     Followed up from  Needed?:  No     Mental Health Referral Needed: No     Release of Information Needed:  Individual or Family Therapist:    Psychiatric psychiatrist and [facility]      Faxing Needed: Yes:      Follow up Requests:  Referral to designated [facility].     Comments:  her cutting has been longstanding.  Patient indicated that  if she had access to something that would cut she would do so.  Her commitment continues until  patient is her own decision maker.     HAIM Huffman  25        Patient Navigator Coordinator Contact Information  Pool Message: [email or web address] ([id number])  Phone:  [phone]  Fax:  [phone]  Email:  [email or web address]

## 2025-05-06 NOTE — TELEPHONE ENCOUNTER
**Patient Navigator Follow Up**    5/6/2025;    Referral for PHP    Comments:  her cutting has been longstanding.  Patient indicated that  if she had access to something that would cut she would do so.  Her commitment continues until 2026 patient is her own decision maker.        **I consulted with Program Supervisors on this case due to barriers listed above.  Here is their response:    However, there is not a way we can guarantee there would be nothing around the that the patient would cut with. We keep sharp stuff locked up in OT. However, we have pens, clipboards, etc everywhere.      It looks like she was actively engaged in DBT at [facility] and that still seems appropriate.        I called and relayed this information to patient and she acknowledged and will continue DBT.  No further action needed by Navigator.        Thank you,    Maya ASHER  Patient Navigator

## 2025-05-14 ENCOUNTER — HOSPITAL ENCOUNTER (EMERGENCY)
Facility: CLINIC | Age: 40
Discharge: HOME | End: 2025-05-15
Attending: FAMILY MEDICINE
Payer: MEDICARE

## 2025-05-14 DIAGNOSIS — R45.851 SUICIDAL IDEATION: ICD-10-CM

## 2025-05-14 DIAGNOSIS — R44.0 AUDITORY HALLUCINATION: ICD-10-CM

## 2025-05-14 DIAGNOSIS — F60.3 BORDERLINE PERSONALITY DISORDER (H): ICD-10-CM

## 2025-05-14 LAB — HCG UR QL: NEGATIVE

## 2025-05-14 PROCEDURE — 80307 DRUG TEST PRSMV CHEM ANLYZR: CPT | Performed by: FAMILY MEDICINE

## 2025-05-14 PROCEDURE — 99285 EMERGENCY DEPT VISIT HI MDM: CPT | Performed by: FAMILY MEDICINE

## 2025-05-14 PROCEDURE — 81025 URINE PREGNANCY TEST: CPT | Performed by: FAMILY MEDICINE

## 2025-05-14 ASSESSMENT — ACTIVITIES OF DAILY LIVING (ADL)
ADLS_ACUITY_SCORE: 58

## 2025-05-15 ENCOUNTER — TELEPHONE (OUTPATIENT)
Dept: BEHAVIORAL HEALTH | Facility: CLINIC | Age: 40
End: 2025-05-15
Payer: MEDICARE

## 2025-05-15 VITALS
DIASTOLIC BLOOD PRESSURE: 84 MMHG | TEMPERATURE: 98.1 F | SYSTOLIC BLOOD PRESSURE: 119 MMHG | RESPIRATION RATE: 17 BRPM | HEART RATE: 90 BPM | OXYGEN SATURATION: 95 %

## 2025-05-15 PROBLEM — F60.3 BORDERLINE PERSONALITY DISORDER (H): Status: ACTIVE | Noted: 2024-12-27

## 2025-05-15 PROBLEM — F32.9 MDD (MAJOR DEPRESSIVE DISORDER): Status: ACTIVE | Noted: 2019-06-20

## 2025-05-15 PROBLEM — F41.1 GENERALIZED ANXIETY DISORDER: Status: ACTIVE | Noted: 2020-05-15

## 2025-05-15 LAB
ALBUMIN SERPL BCG-MCNC: 4 G/DL (ref 3.5–5.2)
ALP SERPL-CCNC: 66 U/L (ref 40–150)
ALT SERPL W P-5'-P-CCNC: 20 U/L (ref 0–50)
AMPHETAMINES UR QL SCN: ABNORMAL
ANION GAP SERPL CALCULATED.3IONS-SCNC: 8 MMOL/L (ref 7–15)
AST SERPL W P-5'-P-CCNC: 19 U/L (ref 0–45)
BARBITURATES UR QL SCN: ABNORMAL
BASOPHILS # BLD AUTO: 0.1 10E3/UL (ref 0–0.2)
BASOPHILS NFR BLD AUTO: 1 %
BENZODIAZ UR QL SCN: ABNORMAL
BILIRUB SERPL-MCNC: 0.4 MG/DL
BUN SERPL-MCNC: 7.5 MG/DL (ref 6–20)
BZE UR QL SCN: ABNORMAL
CALCIUM SERPL-MCNC: 9.1 MG/DL (ref 8.8–10.4)
CANNABINOIDS UR QL SCN: ABNORMAL
CHLORIDE SERPL-SCNC: 105 MMOL/L (ref 98–107)
CREAT SERPL-MCNC: 0.95 MG/DL (ref 0.51–0.95)
EGFRCR SERPLBLD CKD-EPI 2021: 77 ML/MIN/1.73M2
EOSINOPHIL # BLD AUTO: 0.2 10E3/UL (ref 0–0.7)
EOSINOPHIL NFR BLD AUTO: 3 %
ERYTHROCYTE [DISTWIDTH] IN BLOOD BY AUTOMATED COUNT: 15 % (ref 10–15)
FENTANYL UR QL: ABNORMAL
GLUCOSE SERPL-MCNC: 96 MG/DL (ref 70–99)
HCO3 SERPL-SCNC: 26 MMOL/L (ref 22–29)
HCT VFR BLD AUTO: 41.1 % (ref 35–47)
HGB BLD-MCNC: 13.2 G/DL (ref 11.7–15.7)
IMM GRANULOCYTES # BLD: 0 10E3/UL
IMM GRANULOCYTES NFR BLD: 0 %
LYMPHOCYTES # BLD AUTO: 2.3 10E3/UL (ref 0.8–5.3)
LYMPHOCYTES NFR BLD AUTO: 28 %
MCH RBC QN AUTO: 28.9 PG (ref 26.5–33)
MCHC RBC AUTO-ENTMCNC: 32.1 G/DL (ref 31.5–36.5)
MCV RBC AUTO: 90 FL (ref 78–100)
MONOCYTES # BLD AUTO: 0.8 10E3/UL (ref 0–1.3)
MONOCYTES NFR BLD AUTO: 10 %
NEUTROPHILS # BLD AUTO: 4.6 10E3/UL (ref 1.6–8.3)
NEUTROPHILS NFR BLD AUTO: 58 %
NRBC # BLD AUTO: 0 10E3/UL
NRBC BLD AUTO-RTO: 0 /100
OPIATES UR QL SCN: ABNORMAL
PCP QUAL URINE (ROCHE): ABNORMAL
PLATELET # BLD AUTO: 277 10E3/UL (ref 150–450)
POTASSIUM SERPL-SCNC: 4.3 MMOL/L (ref 3.4–5.3)
PROT SERPL-MCNC: 6.9 G/DL (ref 6.4–8.3)
RBC # BLD AUTO: 4.57 10E6/UL (ref 3.8–5.2)
SODIUM SERPL-SCNC: 139 MMOL/L (ref 135–145)
WBC # BLD AUTO: 7.9 10E3/UL (ref 4–11)

## 2025-05-15 PROCEDURE — 250N000013 HC RX MED GY IP 250 OP 250 PS 637: Performed by: PSYCHIATRY & NEUROLOGY

## 2025-05-15 PROCEDURE — 85025 COMPLETE CBC W/AUTO DIFF WBC: CPT | Performed by: FAMILY MEDICINE

## 2025-05-15 PROCEDURE — 250N000013 HC RX MED GY IP 250 OP 250 PS 637: Performed by: EMERGENCY MEDICINE

## 2025-05-15 PROCEDURE — 36415 COLL VENOUS BLD VENIPUNCTURE: CPT | Performed by: FAMILY MEDICINE

## 2025-05-15 PROCEDURE — 84155 ASSAY OF PROTEIN SERUM: CPT | Performed by: FAMILY MEDICINE

## 2025-05-15 RX ORDER — TRAZODONE HYDROCHLORIDE 150 MG/1
300 TABLET ORAL AT BEDTIME
Status: DISCONTINUED | OUTPATIENT
Start: 2025-05-15 | End: 2025-05-15 | Stop reason: HOSPADM

## 2025-05-15 RX ORDER — LAMOTRIGINE 150 MG/1
150 TABLET ORAL 2 TIMES DAILY
Status: DISCONTINUED | OUTPATIENT
Start: 2025-05-15 | End: 2025-05-15 | Stop reason: HOSPADM

## 2025-05-15 RX ORDER — ARIPIPRAZOLE 5 MG/1
15 TABLET ORAL DAILY
Status: DISCONTINUED | OUTPATIENT
Start: 2025-05-15 | End: 2025-05-15 | Stop reason: HOSPADM

## 2025-05-15 RX ORDER — HYDROXYZINE HYDROCHLORIDE 50 MG/1
50 TABLET, FILM COATED ORAL ONCE
Status: COMPLETED | OUTPATIENT
Start: 2025-05-15 | End: 2025-05-15

## 2025-05-15 RX ORDER — CLONAZEPAM 1 MG/1
1 TABLET ORAL 3 TIMES DAILY
Status: DISCONTINUED | OUTPATIENT
Start: 2025-05-15 | End: 2025-05-15 | Stop reason: HOSPADM

## 2025-05-15 RX ADMIN — LAMOTRIGINE 150 MG: 150 TABLET ORAL at 11:00

## 2025-05-15 RX ADMIN — HYDROXYZINE HYDROCHLORIDE 50 MG: 50 TABLET ORAL at 04:54

## 2025-05-15 RX ADMIN — ARIPIPRAZOLE 15 MG: 5 TABLET ORAL at 11:00

## 2025-05-15 RX ADMIN — CLONAZEPAM 1 MG: 1 TABLET ORAL at 11:00

## 2025-05-15 RX ADMIN — CITALOPRAM HYDROBROMIDE 30 MG: 10 TABLET ORAL at 11:00

## 2025-05-15 ASSESSMENT — ACTIVITIES OF DAILY LIVING (ADL)
ADLS_ACUITY_SCORE: 58

## 2025-05-15 ASSESSMENT — COLUMBIA-SUICIDE SEVERITY RATING SCALE - C-SSRS
1. SINCE LAST CONTACT, HAVE YOU WISHED YOU WERE DEAD OR WISHED YOU COULD GO TO SLEEP AND NOT WAKE UP?: NO
5. HAVE YOU STARTED TO WORK OUT OR WORKED OUT THE DETAILS OF HOW TO KILL YOURSELF? DO YOU INTEND TO CARRY OUT THIS PLAN?: NO
ATTEMPT SINCE LAST CONTACT: NO
6. HAVE YOU EVER DONE ANYTHING, STARTED TO DO ANYTHING, OR PREPARED TO DO ANYTHING TO END YOUR LIFE?: NO
2. HAVE YOU ACTUALLY HAD ANY THOUGHTS OF KILLING YOURSELF?: YES
TOTAL  NUMBER OF INTERRUPTED ATTEMPTS SINCE LAST CONTACT: NO
TOTAL  NUMBER OF ABORTED OR SELF INTERRUPTED ATTEMPTS SINCE LAST CONTACT: NO

## 2025-05-15 NOTE — PHARMACY-ADMISSION MEDICATION HISTORY
Pharmacist Admission Medication History    Admission medication history is complete. The information provided in this note is only as accurate as the sources available at the time of the update.    Information Source(s): Facility ([facility] medication list/MAR via [facility] (# [phone])    Changes made to PTA medication list:  Added: None  Deleted: None  Changed: None    Allergies reviewed with patient and updates made in EHR: yes    Medication History Completed By: Xavier Saba [facility] 5/15/2025 9:57 AM    PTA Med List   Medication Sig Last Dose/Taking    ARIPiprazole (ABILIFY) 15 MG tablet Take 1 tablet (15 mg) by mouth daily. Taking    citalopram (CELEXA) 10 MG tablet Take 3 tablets (30 mg) by mouth daily. Taking    clonazePAM (KLONOPIN) 1 MG tablet Take 1 mg by mouth 3 times daily. Taking    docusate sodium (COLACE) 100 MG capsule Take 1 capsule (100 mg) by mouth daily. Taking    hydrOXYzine HCl (ATARAX) 50 MG tablet Take 1 tablet (50 mg) by mouth every 6 hours as needed for itching. Taking As Needed    lamoTRIgine (LAMICTAL) 150 MG tablet Take 150 mg by mouth 2 times daily. Taking    levothyroxine (SYNTHROID/LEVOTHROID) 25 MCG tablet Take 0.5 tablets (12.5 mcg) by mouth every morning (before breakfast). Taking    metFORMIN (GLUCOPHAGE) 1000 MG tablet Take 1 tablet (1,000 mg) by mouth 2 times daily (with meals). Taking    miconazole (MICATIN) 2 % external powder Apply topically 2 times daily. to rash under breasts Taking    OLANZapine (ZYPREXA) 15 MG tablet Take 1 tablet (15 mg) by mouth at bedtime. Taking    Omega-3 Fish Oil 500 MG capsule Take 1 capsule (500 mg) by mouth daily. Taking    polyethylene glycol (MIRALAX) 17 GM/Dose powder Take 17 g by mouth daily as needed for constipation. Taking As Needed    prazosin (MINIPRESS) 1 MG capsule Take 3 capsules (3 mg) by mouth at bedtime. Taking    traZODone (DESYREL) 150 MG tablet Take 300 mg by mouth at bedtime. Taking

## 2025-05-15 NOTE — CONSULTS
"[facility]  Department of Psychiatry  Consultation note    Misty Parham MRN: [id number]   Age: 39 year old YOB: 1985     History     Chief Complaint   Patient presents with    Suicidal     Pt came from  via EMS.  Pt has history of depression, anxiety and bauditory hallucination.   EMS report Pt is SI with plan ( by taking many medications).     HPI  Misty Parham is a 39 year old female with history of MDD, borderline pd here with SI with plan to overdose and  telling her to do so. She told her therapist she was stock piling pills. She requested discharge in the ED so was placed on a 72 hour hold. She reports that she hears \"angels\". She has been isolating more and has been having trouble sleeping. See DEC assessment for more details. When I saw patient she presented as rather flat and showed some psychomotor slowing. Current medications are abilify 15 mg daily, citalopram 10 mg daily, clonazepam 1 mg TID, trazodone 300 mg at bedtime, lamotrigine 150 mg BID, olanzapine 15 mg at bedtime. She reports medication compliance. She feels that her medications are helpful, she feels that \"I have to do my part\" with regards to DBT skills, etc. She has a history of hospitalizations, last in April. History of previous suicide attempts by cutting and overdosing. She denies alcohol or drug use, UDS + benzos (prescribed).     Past Medical History  Past Medical History:   Diagnosis Date    Depressive disorder     Generalized anxiety disorder 5/15/2020     Past Surgical History:   Procedure Laterality Date    CHOLECYSTECTOMY       ARIPiprazole (ABILIFY) 15 MG tablet  citalopram (CELEXA) 10 MG tablet  clonazePAM (KLONOPIN) 1 MG tablet  docusate sodium (COLACE) 100 MG capsule  hydrOXYzine HCl (ATARAX) 50 MG tablet  lamoTRIgine (LAMICTAL) 150 MG tablet  levothyroxine (SYNTHROID/LEVOTHROID) 25 MCG tablet  metFORMIN (GLUCOPHAGE) 1000 MG tablet  miconazole (MICATIN) 2 % " external powder  OLANZapine (ZYPREXA) 15 MG tablet  Omega-3 Fish Oil 500 MG capsule  polyethylene glycol (MIRALAX) 17 GM/Dose powder  prazosin (MINIPRESS) 1 MG capsule  traZODone (DESYREL) 150 MG tablet      No Known Allergies  Family History  Family History   Problem Relation Age of Onset    Diabetes Father      Social History   Social History     Tobacco Use    Smoking status: Never     Passive exposure: Current    Smokeless tobacco: Never   Vaping Use    Vaping status: Never Used   Substance Use Topics    Alcohol use: Not Currently    Drug use: Never          Review of Systems  A medically appropriate review of systems was performed with pertinent positives and negatives noted in the HPI, and all other systems negative.    Physical Examination   BP: 123/88  Pulse: 80  Temp: 98.1  F (36.7  C)  Resp: 16  SpO2: 100 %    Physical Exam  General: Appears stated age.   Neuro: Alert and fully oriented. Extremities appear to demonstrate normal strength on visual inspection.   Integumentary/Skin: no rash visualized, normal color    Psychiatric Examination   Appearance: awake, alert and casually dressed  Attitude:  cooperative  Eye Contact:  fair  Mood:  depressed  Affect:  flat  Speech:  increased speech latency and slow  Psychomotor Behavior:  + psychomotor slowing  Thought Process:  goal oriented  Associations:  no loose associations  Thought Content:  +SI/AH, denies HI/VH, not responding to internal stimuli  Insight:  fair  Judgement:  limited  Oriented to:  time, person, and place  Attention Span and Concentration:  fair  Recent and Remote Memory:  intact  Language: able to name/identify objects without impairment  Fund of Knowledge: intact with awareness of current and past events    ED Course        Labs Ordered and Resulted from Time of ED Arrival to Time of ED Departure   URINE DRUG SCREEN PANEL - Abnormal       Result Value    Amphetamines Urine Screen Negative      Barbituates Urine Screen Negative       Benzodiazepine Urine Screen Positive (*)     Cannabinoids Urine Screen Negative      Cocaine Urine Screen Negative      Fentanyl Qual Urine Screen Negative      Opiates Urine Screen Negative      PCP Urine Screen Negative     HCG QUALITATIVE URINE - Normal    hCG Urine Qualitative Negative     COMPREHENSIVE METABOLIC PANEL - Normal    Sodium 139      Potassium 4.3      Carbon Dioxide (CO2) 26      Anion Gap 8      Urea Nitrogen 7.5      Creatinine 0.95      GFR Estimate 77      Calcium 9.1      Chloride 105      Glucose 96      Alkaline Phosphatase 66      AST 19      ALT 20      Protein Total 6.9      Albumin 4.0      Bilirubin Total 0.4     CBC WITH PLATELETS AND DIFFERENTIAL    WBC Count 7.9      RBC Count 4.57      Hemoglobin 13.2      Hematocrit 41.1      MCV 90      MCH 28.9      MCHC 32.1      RDW 15.0      Platelet Count 277      % Neutrophils 58      % Lymphocytes 28      % Monocytes 10      % Eosinophils 3      % Basophils 1      % Immature Granulocytes 0      NRBCs per 100 WBC 0      Absolute Neutrophils 4.6      Absolute Lymphocytes 2.3      Absolute Monocytes 0.8      Absolute Eosinophils 0.2      Absolute Basophils 0.1      Absolute Immature Granulocytes 0.0      Absolute NRBCs 0.0         Assessments & Plan (with Medical Decision Making)   Patient presenting with SI and plan to overdose, reported stock piling pills to her therapist. Nursing notes reviewed noting no acute issues. She presents as flat with psychomotor slowing. She is at elevated risk due to her history, command type AH. She requested discharge so is on a 72 hour hold. She has been cooperative here and is willing to take medications.     I have reviewed the assessment completed by the [facility].     Preliminary diagnosis:    ICD-10-CM    1. Borderline personality disorder (H) F60.3       2. Suicidal ideation                       Treatment Plan:  -Patient is on the list for an inpatient bed  -Ordered her PTA outpatient medications.      --  Samara Felipe MD   [facility] EMERGENCY DEPARTMENT

## 2025-05-15 NOTE — ED NOTES
Order for recommitment received, signed by [facility] on 4/9/2025. Faxed to the ED, STAT, and patient placement.               SARAH Giles  [facility]  [phone]   Anaplastic ALK-positive large cell lymphoma, unspecified body region

## 2025-05-15 NOTE — ED PROVIDER NOTES
[facility] EMERGENCY DEPARTMENT ([facility])    5/14/25            History     Chief Complaint   Patient presents with    Suicidal     Pt came from  via EMS.  Pt has history of depression, anxiety and bauditory hallucination.   EMS report Pt is SI with plan ( by taking many medications).     HPI  Misty Parham is a 39 year old female who with a past history notable for major depressive disorder, borderline personality disorder, and generalized anxiety disorder with a history of psychosis who presents to the emergency department for evaluation of suicidal ideation.     As per Epic review: Patient presented to [facility] on 4/3/[facility]25 and was discharged on 4/24/2025 after presenting with suicidal ideation.  She reported passive Suicidal ideation and frequent Auditory hallucinations: voices of angels telling her that she should join them and die. She was treated with antidepressants (Celexa) and mood stabilizers as well as antipsychotics. Gradually, patient's condition improved and for the last 4-5 days of hospitalization she was able to communicate her discomfort to staff and abstain from Self injurious behavior. We contacted outpatient team and they agreed to take Misty back to her group home but informed us that they would prefer Misty to go to [facility] first, before returning to her regular DBT groups. Discussed her previous thoughts of buying  and harming self with it.    Past Medical History  Past Medical History:   Diagnosis Date    Depressive disorder     Generalized anxiety disorder 5/15/2020     Past Surgical History:   Procedure Laterality Date    CHOLECYSTECTOMY       ARIPiprazole (ABILIFY) 15 MG tablet  citalopram (CELEXA) 10 MG tablet  clonazePAM (KLONOPIN) 1 MG tablet  docusate sodium (COLACE) 100 MG capsule  hydrOXYzine HCl (ATARAX) 50 MG tablet  lamoTRIgine (LAMICTAL) 150 MG tablet  levothyroxine (SYNTHROID/LEVOTHROID) 25 MCG tablet  metFORMIN (GLUCOPHAGE) 1000 MG  tablet  miconazole (MICATIN) 2 % external powder  OLANZapine (ZYPREXA) 15 MG tablet  Omega-3 Fish Oil 500 MG capsule  polyethylene glycol (MIRALAX) 17 GM/Dose powder  prazosin (MINIPRESS) 1 MG capsule  traZODone (DESYREL) 150 MG tablet      No Known Allergies  Family History  Family History   Problem Relation Age of Onset    Diabetes Father      Social History   Social History     Tobacco Use    Smoking status: Never     Passive exposure: Current    Smokeless tobacco: Never   Vaping Use    Vaping status: Never Used   Substance Use Topics    Alcohol use: Not Currently    Drug use: Never      Past medical history, past surgical history, medications, allergies, family history, and social history were reviewed with the patient. No additional pertinent items.   A complete review of systems was performed with pertinent positives and negatives noted in the HPI, and all other systems negative.    Physical Exam   BP: 123/88  Pulse: 80  Temp: 98.1  F (36.7  C)  Resp: 16  SpO2: 100 %  Physical Exam  Constitutional:       General: She is not in acute distress.     Appearance: Normal appearance. She is not diaphoretic.   HENT:      Head: Atraumatic.      Mouth/Throat:      Mouth: Mucous membranes are moist.   Eyes:      General: No scleral icterus.     Conjunctiva/sclera: Conjunctivae normal.   Cardiovascular:      Rate and Rhythm: Normal rate.      Heart sounds: Normal heart sounds.   Pulmonary:      Effort: No respiratory distress.      Breath sounds: Normal breath sounds.   Abdominal:      General: Abdomen is flat.   Musculoskeletal:      Cervical back: Neck supple.   Skin:     General: Skin is warm.      Findings: No rash.   Neurological:      General: No focal deficit present.      Mental Status: She is alert and oriented to person, place, and time.      Sensory: No sensory deficit.      Motor: No weakness.      Coordination: Coordination normal.   Psychiatric:         Attention and Perception: She perceives auditory  hallucinations.         Mood and Affect: Mood is anxious.         Speech: Speech is delayed.         Behavior: Behavior is slowed and withdrawn.         Thought Content: Thought content includes suicidal ideation. Thought content includes suicidal plan.           ED Course, Procedures, & Data      Procedures     Results for orders placed or performed during the hospital encounter of 05/14/25   HCG qualitative urine (UPT)     Status: Normal   Result Value Ref Range    hCG Urine Qualitative Negative Negative   Urine Drug Screen     Status: None ()    Narrative    The following orders were created for panel order Urine Drug Screen.  Procedure                               Abnormality         Status                     ---------                               -----------         ------                     Urine Drug Screen Panel[[id number]]                                                      Please view results for these tests on the individual orders.     Medications - No data to display  Labs Ordered and Resulted from Time of ED Arrival to Time of ED Departure   HCG QUALITATIVE URINE - Normal       Result Value    hCG Urine Qualitative Negative     URINE DRUG SCREEN PANEL     No orders to display          Critical care was not performed.     Medical Decision Making  The patient's presentation was of high complexity (a chronic illness severe exacerbation, progression, or side effect of treatment).    The patient's evaluation involved:  ordering and/or review of 2 test(s) in this encounter (see separate area of note for details)  discussion of management or test interpretation with another health professional (was seen and evaluated by DEC  please refer to their documentation at this time we did discussion of hospitalization versus outpatient management and patient is not able to safety plan will be admitted to inpatient psychiatric services on a 72-hour hold.)    The patient's management necessitated high risk  (a decision regarding hospitalization).    Assessment & Plan        I have reviewed the nursing notes. I have reviewed the findings, diagnosis, plan and need for follow up with the patient.    Patient with underlying significant borderline personality disorder at this time is not able to safety plan but is also not wanting to stay here patient will be placed on 72-hour hold with possible inpatient admission when bed available    Final diagnoses:   Suicidal ideation   Auditory hallucination   Borderline personality disorder (H)       Barry Rose  [facility] EMERGENCY DEPARTMENT  5/14/2025     Barry Rose MD  05/15/25 0101

## 2025-05-15 NOTE — ED PROVIDER NOTES
"Emergency Department I-PASS Sign-out      Illness Severity: \"Watcher\"    Patient Summary:  39 year old female with pertinent PMH of borderline personality disorder who presented with auditory hallucinations and suicidal ideation    ED Course/treatment plan: Admit on 72-hour hold    Clinical Impression:  (R45.851) Suicidal ideation    (R44.0) Auditory hallucination    (F60.3) Borderline personality disorder (H)      Edited by: Barry Rose MD at 5/15/2025 0102    Action List:  Tests to Follow-up:  None    Medications Reconciled/Ordered:  Yes    ED Mental Health Boarding Order Set Used for Diet/PRNs/Other:  Yes    DEC, Extended Care, Psych Consult Orders:  Extended Care and Psychiatry consult ordered.    Situational Awareness & Contingency Plannin Hour Hold Status:  On 72 hour hold.  Active Orders  Legal  Health Officer Authority to Detain (LENCHO)  Frequency: Effective Now  Start Date/Time: 253   Number of Occurrences: Until Specified  Order Comments:  This patient presented with circumstances that have led me to be reasonably suspicious that the patient is at significant risk of self-harm. The patient's judgment to this situation appears to be impaired. Given the circumstances in which the patient presented, it is likely that the patient is at significant risk of attempting self harm if this situation is not investigated further. I am highly concerned that the patient is mentally ill and currently cannot safely care for oneself. This represents endangerment to the patient's well-being and safety, and I am placing a Health Officer Authority hold upon the patient at this time.    Psychiatric Emergency:  A psychiatric emergency is not active    Disposition:  Admit/Transfer to Behavioral Health, medically clear for admit/transfer      Synthesis & Events after sign-out:  Patient was seen and evaluated by extended care and psychiatry.  She has had significant improvement and is able to safety " plan.  She denies any ongoing suicidal ideation.  Her  was also involved and patient is at her baseline.  Group home is willing to accept the patient back and patient is in agreement with plan.  Will discharge with close return precautions.    Shanika Garland MD   Emergency Medicine       Shanika Garland MD  05/15/25 3971

## 2025-05-15 NOTE — ED NOTES
Intake has called, patient has been placed at [facility], in unit [address] [place], under Dr. Escalera, nurse to nurse can be given at [phone].

## 2025-05-15 NOTE — TELEPHONE ENCOUNTER
R:    Joel Monahan 11:19 AM    MRN: [id number] can be removed from IP MH worklist, d/c back to .     Intake notes pt removed from active placement [facility]. Intake will no longer follow for IP MH admission.

## 2025-05-15 NOTE — DISCHARGE INSTRUCTIONS
Please utilize your safety plan and follow up with Victor Hugo on updating this plan.     Reduce Extreme Emotion  QUICKLY:  Changing Your Body Chemistry      T:  Change your body Temperature to change your autonomic nervous system   Use Ice Water to calm yourself down FAST   Put your face in a bowl of ice water (this is the best way; have the person keep his/her face in ice water for 30-45 seconds - initial research is showing that the longer s/he can hold her/his face in the water, the better the response), or   Splash ice water on your face, or hold an ice pack on your face      I:  Intensely exercise to calm down a body revved up by emotion   Examples: running, walking fast, jumping, playing basketball, weight lifting, swimming, calisthenics, etc.   Engage in exercises that DO NOT include violent behaviors. Exercises that utilize violent behaviors tend to function as  behavioral rehearsal,  and rather than calming the person down, may actually  rev  the person up more, increasing the likelihood of violence, and lessening the likelihood that they will  burn off  energy     P:  Progressively relax your muscles   Starting with your hands, moving to your forearms, upper arms, shoulders, neck, forehead, eyes, cheeks and lips, tongue and teeth, chest, upper back, stomach, buttocks, thighs, calves, ankles, feet   Tense (10 seconds,   of the way), then relax each muscle (all the way)   Notice the tension   Notice the difference when relaxed (by tensing first, and then relaxing, you are able to get a more thorough relaxation than by simply relaxing)     P: Paced breathing to relax   The standard technique is to begin with counting the number of steps one takes for a typical inhale, then counting the steps one takes for a typical exhale, and then lengthening the amount of steps for the exhalation by one or two steps.  OR  Repeat this pattern for 1-2 minutes  Inhale for four (4) seconds   Exhale for six (6) to  eight (8) seconds   Research demonstrated that one can change one's overall level of anxiety by doing this exercise for even a few minutes per day

## 2025-05-15 NOTE — ED NOTES
IP MH Referral Acuity Rating Score (RARS)    LMHP complete at referral to IP MH, with DEC; and, daily while awaiting IP MH placement. Call score to PPS.  CRITERIA SCORING   New 72 HH and Involuntary for IP MH (not adolescent) 3/3   Boarding over 24 hours 0/1   Vulnerable adult at least 55+ with multiple co morbidities; or, Patient age 11 or under 0/1   Suicide ideation without relief of precipitating factors 1/1   Current plan for suicide 1/1   Current plan for homicide 0/1   Imminent risk or actual attempt to seriously harm another without relief of factors precipitating the attempt 0/1   Severe dysfunction in daily living (ex: complete neglect for self care, extreme disruption in vegetative function, extreme deterioration in social interactions) 0/1   Recent (last 2 weeks) or current physical aggression in the ED 0/1   Restraints or seclusion episode in ED 0/1   Verbal aggression, agitation, yelling, etc., while in the ED 0/1   Active psychosis with psychomotor agitation or catatonia 0/1   Need for constant or near constant redirection (from leaving, from others, etc).  0/1   Intrusive or disruptive behaviors 0/1   TOTAL 5

## 2025-05-15 NOTE — ED TRIAGE NOTES
"Pt came to ED via EMS.  Pt state : \"I am hearing my angles telling me is time to die\". PT report the plane is to overdose. PT appears sad and avoiding eye contact.     Triage Assessment (Adult)       Row Name 05/14/25 9738          Triage Assessment    Airway WDL WDL        Respiratory WDL    Respiratory WDL WDL        Skin Circulation/Temperature WDL    Skin Circulation/Temperature WDL WDL        Cardiac WDL    Cardiac WDL WDL        Peripheral/Neurovascular WDL    Peripheral Neurovascular WDL WDL        Cognitive/Neuro/Behavioral WDL    Cognitive/Neuro/Behavioral WDL WDL                     "

## 2025-05-15 NOTE — PLAN OF CARE
Misty Hernandezne  May 15, 2025  Plan of Care Hand-off Note     Patient Recommended Care Path: inpatient mental health    Clinical Substantiation:  At this time the pt is currently presenting as an acute risk to self or others due to the following factors: Pt presented to the ED due to SI and Command AH.  Pt continues encourse SI with plans and intent via the medication at  which pt still refuses to give up. Pt also endorses AH that are command in nature that ar enecourging suicide.  Pt denies  NSSI, HI, VH, and substance use. Pt continues to refuses to engage in safety and turning the medication she own at the .  It is the recommendation of MD that pt is admitted into IP mental health for stabilization. PT was added on the inpatient worklist due to this. Pt appears resistant to this recommendation and was placed on 72-hour hold.  Pt has current ongoing outpatient support with medication management, thearpy and case mangement, so it will be beneficial to follow up with these [facility].    Goals for crisis stabilization:  Decrease  and stablization with SI and command AH    Next steps for Care Team:  Psych consult, safety plannning and connecting back to outpatient services if deemed ready to discharge    Treatment Objectives Addressed:  identifying an appropriate aftercare plan, rapport building, anger management, processing feelings, assessing safety    Therapeutic Interventions:  Engaged in cognitive restructuring/ reframing, looked at common cognitive distortions and challenged negative thoughts., Engaged in guided discovery, explored patient's perspectives and helped expand them through socratic dialogue.    Has a specific means been identified for suicidal.homicide actions: Yes  If yes, describe: Via ovedosing with medication ,  Explain action steps toward mitigation: PT is currently placed on low inpatient work list with a 72-hour hold  Document completion of mitigation action: Done  The follow  up action still needed prior to discharge: Psych consult, safety planning and connecting back with outpatient resources if deemed ready to discharge    Patient coping skills attempted to reduce the crisis:  Talking with thearpist       Imminent risk of harm: Suicidal Behavior  Severe psychiatric, behavioral or other comorbid conditions are appropriate for management at inpatient mental health as indicated by at least one of the following: Psychiatric Symptoms  Severe dysfunction in daily living is present as indicated by at least one of the following: Other evidence of severe dysfunction  Situation and expectations are appropriate for inpatient care: Patient management/treatment at lower level of care is not feasible or is inappropriate  Inpatient mental health services are necessary to meet patient needs and at least one of the following: Specific condition related to admission diagnosis is present and judged likely to deteriorate in absence of treatment at proposed level of care      Collateral contact information:  Chelo Myers supervsior, [phone]    Legal Status: 72 Hour Hold                         72 Hour Hold - Date/Time Initiated: 5/15/                         72 Hour Hold - Date/Time Ends: 5/20/                                                                             Reviewed court records: yes     Psychiatry Consult: Patient has Psychiatry Consult Order    ANURAG Koenig

## 2025-05-15 NOTE — CONSULTS
Diagnostic Evaluation Consultation  Crisis Assessment    Patient Name: Misty Parham  Age:  39 year old  Legal Sex: female  Gender Identity: female  Pronouns:  she/her    Race: White  Ethnicity: Not  or   Language: English      Patient was assessed: In person   Crisis Assessment Start Date: 05/15/25  Crisis Assessment Start Time: 0030  Crisis Assessment Stop Time: 0050  Patient location: [facility] Emergency Department                             ED15    Referral Data and Chief Complaint  Misty Parham presents to the ED via EMS. Patient is presenting to the ED for the following concerns: Suicidal ideation, Suicide attempt, Depression. Factors that make the mental health crisis life threatening or complex are: PT presented to [facility] ED via EMS due to SI concerns.  PT reported that she has been wanting to overdose via medications.  PT continues to endorse SI with plans and intent via overdosing with medications she is hiding in her room.  PT also endorses AH that are command in nature.  PT reports that she is hearing angels voices that are telling her to hurt herself so that she can become an anmol as well.  PT denies current NSSI, VH, HI and substance use. PT reports that she got some medications this past Sunday and has been keeping it in her room for the past couple of days.  PT reports that she informed her therapist about what she has been thinking which is what led her to the ED today.  PT states that she wants everyone to just leave her alone so that she can commit suicide as she is tired of all this.  PT reports that she struggles with sleeping and constantly wakes up throughout the night.  PT endorses getting an average of 6 hours of interrupted sleep.  PT endorses experiencing lack of appetite and isolating more.  PT refuses to engage in safety planning and is not open to staying the night/going to inpatient..      Informed Consent and Assessment Methods  Explained the crisis  assessment process, including applicable information disclosures and limits to confidentiality, assessed understanding of the process, and obtained consent to proceed with the assessment.  Assessment methods included conducting a formal interview with patient, review of medical records, collaboration with medical staff, and obtaining relevant collateral information from family and community providers when available.  : done     History of the Crisis   PT has Hx of past diagnoses of MDD, IVY and BPD.  PT has Hx of SI with 2 prior attempts via cutting and overdosing.  PT also has Hx for NSSI via cutting as well as command AH for self-harm and suicide.  PT denies Hx with HI, VH, and substance use.  PT is currently seeing a therapist and a psychiatric medication management through [facility].  PT currently has a CADI waiver  through [facility].  PT also is currently on civil commitment that started in 3/13 2025 and has Hx of multiple previous commitments.  PT also has a civil commitment  as well. PT is currently living at  [facility] since [facility] September  PT has Hx of multiple ED visits for similar concerns as well as multiple  IP with the latest 1 being from 4/3/2025 to 4/25/2025.  PT has Hx of programmatic care with DBT group therapy through [facility] that she no longer attends anymore.    Brief Psychosocial History  Family:  Single, Children yes  Support System:  Sibling(s), Facility resident(s)/Staff, Children, Parent(s)  Employment Status:  unemployed  Source of Income:  unable to assess  Financial Environmental Concerns:  none  Current Hobbies:  other (see comments) (None reported)  Barriers in Personal Life:  behavioral concerns, emotional concerns, mental health concerns    Significant Clinical History  Current Anxiety Symptoms:   (None reported)  Current Depression/Trauma:  sadness, hopelessness, helplessness, withdrawl/isolation,  negativistic, crying or feels like crying, thoughts of death/suicide  Current Somatic Symptoms:   (None reported)  Current Psychosis/Thought Disturbance:  auditory hallucinations, high risk behavior  Current Eating Symptoms:  loss of appetite  Chemical Use History:  Alcohol: None  Benzodiazepines: None  Opiates: None  Cocaine: None  Marijuana: None  Other Use: None  Withdrawal Symptoms:  (N/A)  Addictions:  (None reported)   Past diagnosis:  Anxiety Disorder, Depression, Personality Disorder  Family history:  No known history of mental health or chemical health concerns  Past treatment:  Individual therapy, Psychiatric Medication Management, Case management, Civil Commitment, Other Holistic Treatment, Inpatient Hospitalization, Supportive Living Environment (group home, [facility] house, etc)  Details of most recent treatment:  PT is currently seeing a therapist and a psychiatric medication management through [facility]. PT currently has a CADI waiver  through [facility]. PT also is currently on civil commitment that started in 3/13 2025 and has Hx of multiple previous commitments. PT also has a civil commitment  as well. PT is currently living at [facility] since last September PT has Hx of multiple ED visits for similar concerns as well as multiple  IP with the latest 1 being from 4/3/2025 to 4/25/2025. PT has Hx of programmatic care with DBT group therapy through [facility] that she no longer attends anymore.  Other relevant history:       Have there been any medication changes in the past two weeks:  no       Is the patient compliant with medications:  yes        Collateral Information  Is there collateral information: Yes     Collateral information name, relationship, phone number:  Chelo Myers, [phone]    What happened today: PT staff reports that today they received an email from PTs therapist informing them that PT  "disclosed during a therapy session on Tuesday the she has a suicide plan via overdosing with medications that she is hiding in her room.  PT staff reports that they attempted to confront PT regarding this and PT continue to endorse SI with plans and intent via overdosing.  They report that PT continue to refuse to turn in the medications she had in her room and continues to endorse suicide plan via them.  PT staff reports that PT informed them that she bought these medications during a visit that she went with sister on Mother's Day.  They report that PT is able to return back to the  if deemed ready to discharge and that they will continue to do room search to try to find these medications.     What is different about patient's functioning: PT staff reports that this is baseline behavior for PT. They report that pt has been taking her medications.     What do you think the patient needs:      Has patient made comments about wanting to kill themselves/others: yes    If d/c is recommended, can they take part in safety/aftercare planning:  yes    Additional collateral information:  attempted to connect with Petra Parham  Sister  [phone]. No DENNISE mendez     Risk Assessment  [place] Suicide Severity Rating Scale Full Clinical Version:  Suicidal Ideation  Q1 Wish to be Dead (Lifetime): Yes  Q2 Non-Specific Active Suicidal Thoughts (Lifetime): Yes  3. Active Suicidal Ideation with any Methods (Not Plan) Without Intent to Act (Lifetime): Yes  4. Active Suicidal Ideation with Some Intent to Act, Without Specific Plan (Lifetime): Yes  5. Active Suicidal Ideation with Specific Plan and Intent (Lifetime): Yes  Q6 Suicide Behavior (Lifetime): yes  Intensity of Ideation (Lifetime)  Most Severe Ideation Rating (Lifetime): 4  Description of Most Severe Ideation (Lifetime): \" I have alwasy been battleing between its time to die and it's selffish, I have kids who wont understand..I am just margaux tired\".  Frequency (Lifetime): " "Daily or almost daily  Duration (Lifetime): More than 8 hours/persistent or continuous  Controllability (Lifetime): Can control thoughts with some difficulty  Deterrents (Lifetime): Uncertain that deterrents stopped you  Reasons for Ideation (Lifetime): Equally to get attention, revenge, or a reaction from others and to end/stop the pain  Suicidal Behavior (Lifetime)  Actual Attempt (Lifetime): Yes  Total Number of Actual Attempts (Lifetime): 2  Actual Attempt Description (Lifetime): Via overdoseing and cutting  Has subject engaged in non-suicidal self-injurious behavior? (Lifetime): Yes  Interrupted Attempts (Lifetime): No  Aborted or Self-Interrupted Attempt (Lifetime): No  Preparatory Acts or Behavior (Lifetime): Yes    [place] Suicide Severity Rating Scale Recent:   Suicidal Ideation (Recent)  Q1 Wished to be Dead (Past Month): yes  Q2 Suicidal Thoughts (Past Month): yes  Q3 Suicidal Thought Method: yes  Q4 Suicidal Intent without Specific Plan: yes  Q5 Suicide Intent with Specific Plan: yes  Level of Risk per Screen: high risk  Intensity of Ideation (Recent)  Most Severe Ideation Rating (Past 1 Month): 3  Description of Most Severe Ideation (Past 1 Month): \" I have alwasy been battleing between its time to die and it's selffish, I have kids who wont understand..I am just margaux tired\".  Frequency (Past 1 Month): Daily or almost daily  Duration (Past 1 Month): More than 8 hours/persistent or continuous  Controllability (Past 1 Month): Can control thoughts with some difficulty  Deterrents (Past 1 Month): Uncertain that deterrents stopped you  Reasons for Ideation (Past 1 Month): Equally to get attention, revenge, or a reaction from others and to end/stop the pain  Suicidal Behavior (Recent)  Actual Attempt (Past 3 Months): Yes  Total Number of Actual Attempts (Past 3 Months): 1  Actual Attempt Description (Past 3 Months): via cutting  Has subject engaged in non-suicidal self-injurious behavior? (Past 3 Months): " Yes  Interrupted Attempts (Past 3 Months): No  Aborted or Self-Interrupted Attempt (Past 3 Months): No  Preparatory Acts or Behavior (Past 3 Months): Yes  Total Number of Preparatory Acts (Past 3 Months): 2  Preparatory Acts or Behavior Description (Past 3 Months): Currently has medication in her room that she is plannig to overdosing    Environmental or Psychosocial Events: challenging interpersonal relationships, helplessness/hopelessness, other life stressors  Protective Factors: Protective Factors: lives in a responsibly safe and stable environment, good treatment engagement, able to access care without barriers, supportive ongoing medical and mental health care relationships    Does the patient have thoughts of harming others? Feels Like Hurting Others: no  Previous Attempt to Hurt Others: no  Current presentation:  (Calm and engaged)  Is the patient engaging in sexually inappropriate behavior?: no  Does Patient have a known history of aggressive behavior: No    Is the patient engaging in sexually inappropriate behavior?  no        Mental Status Exam   Affect: Blunted, Flat  Appearance: Appropriate  Attention Span/Concentration: Attentive  Eye Contact: Engaged    Fund of Knowledge: Appropriate   Language /Speech Content: Fluent  Language /Speech Volume: Normal  Language /Speech Rate/Productions: Normal  Recent Memory: Intact  Remote Memory: Intact  Mood: Depressed, Sad  Orientation to Person: Yes   Orientation to Place: Yes  Orientation to Time of Day: Yes  Orientation to Date: Yes     Situation (Do they understand why they are here?): Yes  Psychomotor Behavior: Underactive  Thought Content: Hallucinations, Suicidal  Thought Form: Intact     Mini-Cog Assessment  Number of Words Recalled:    Clock-Drawing Test:     Three Item Recall:    Mini-Cog Total Score:       Medication  Psychotropic medications:   Medication Orders - Psychiatric (From admission, onward)      None             Current Care Team  Patient Care  Team:  [facility], Willis Cinthya White as PCP - Jose Juan [facility] as Pharmacist (Pharmacist)  Jelena Mackey MD as Assigned PCP  Dinesh Renee OD as Assigned Surgical Provider    Diagnosis  Patient Active Problem List   Diagnosis Code    Depression F32.A    Suicidal ideation R45.851    Depression with suicidal ideation F32.A, R45.851    MDD (major depressive disorder) F32.9    Suicidal behavior R45.89    Facial cellulitis L03.211    Auditory hallucination R44.0    Psychosis, unspecified psychosis type (H) F29    Morbid obesity (H) E66.01    Chronic insomnia F51.04    Generalized anxiety disorder F41.1    COVID-19 U07.1    Borderline personality disorder (H) F60.3    Depression, unspecified depression type F32.A       Primary Problem This Admission  Active Hospital Problems    *Borderline personality disorder (H)      Generalized anxiety disorder      MDD (major depressive disorder)        Clinical Summary and Substantiation of Recommendations   Clinical Substantiation:  At this time the pt is currently presenting as an acute risk to self or others due to the following factors: Pt presented to the ED due to SI and Command AH.  Pt continues encourse SI with plans and intent via the medication at  which pt still refuses to give up. Pt also endorses AH that are command in nature that ar enecourging suicide.  Pt denies  NSSI, HI, VH, and substance use. Pt continues to refuses to engage in safety and turning the medication she own at the .  It is the recommendation of MD that pt is admitted into IP mental health for stabilization. PT was added on the inpatient worklist due to this. Pt appears resistant to this recommendation and was placed on 72-hour hold.  Pt has current ongoing outpatient support with medication management, thearpy and case mangement, so it will be beneficial to follow up with these after [facility] discharge.    Goals for crisis stabilization:  Decrease  and stablization with SI  and command     Next steps for Care Team:  Psych consult, safety plannning and connecting back to outpatient services if deemed ready to discharge    Treatment Objectives Addressed:  identifying an appropriate aftercare plan, rapport building, anger management, processing feelings, assessing safety    Therapeutic Interventions:  Engaged in cognitive restructuring/ reframing, looked at common cognitive distortions and challenged negative thoughts., Engaged in guided discovery, explored patient's perspectives and helped expand them through socratic dialogue.    Has a specific means been identified for suicidal/homicide actions: Yes    If yes, describe:  Via ovedosing with medication ,    Explain action steps toward mitigation:  PT is currently placed on low inpatient work list with a 72-hour hold    Document completion of mitigation actions:  Done    The follow up action still needed prior to discharge:  Psych consult, safety planning and connecting back with outpatient resources if deemed ready to discharge    Patient coping skills attempted to reduce the crisis:  Talking with thearpist    Disposition  Recommended referrals: Other. please comment (Continue current outpatient services)        Reviewed case and recommendations with attending provider. Attending Name: Barry Rose       Attending concurs with disposition: yes       Patient and/or validated legal guardian concurs with disposition:   no (Pt was placed on 72 Hr hold)       Final disposition:  inpatient mental health         Imminent risk of harm: Suicidal Behavior  Severe psychiatric, behavioral or other comorbid conditions are appropriate for management at inpatient mental health as indicated by at least one of the following: Psychiatric Symptoms  Severe dysfunction in daily living is present as indicated by at least one of the following: Other evidence of severe dysfunction  Situation and expectations are appropriate for inpatient care: Patient  management/treatment at lower level of care is not feasible or is inappropriate  Inpatient mental health services are necessary to meet patient needs and at least one of the following: Specific condition related to admission diagnosis is present and judged likely to deteriorate in absence of treatment at proposed level of care      Legal status: 72 Hour Hold                         72 Hour Hold - Date/Time Initiated: 5/15/                         72 Hour Hold - Date/Time Ends: 5/20/                                                                             Reviewed court records: yes       Assessment Details   Total duration spent with the patient: 20 min     CPT code(s) utilized: 55310 - Psychotherapy (with patient) - 30 (16-37*) min    ANURAG Koenig, Psychotherapist  [facility] - Triage & Transition Services  Callback: [phone]

## 2025-05-15 NOTE — PROGRESS NOTES
"Triage and Transition Services Extended Care Reassessment     Patient: Misty goes by \"Misty,\" uses she/her pronouns  Date of Service: May 15, 2025  Site of Service: [facility] Emergency Department                             ANIKET  Patient was seen yes  Mode of Assessment: In person     History of Patient's Original Emergency Room Encounter: PT has Hx of past diagnoses of MDD, IVY and BPD.  PT has Hx of SI with 2 prior attempts via cutting and overdosing.  PT also has Hx for NSSI via cutting as well as command AH for self-harm and suicide.  PT denies Hx with HI, VH, and substance use.  PT is currently seeing a therapist and a psychiatric medication management through [facility].  PT currently has a CADI waiver  through [employer].  PT also is currently on civil commitment that started in 3/13 2025 and has Hx of multiple previous commitments.  PT also has a civil commitment  as well. PT is currently living at  [facility] since last September  PT has Hx of multiple ED visits for similar concerns as well as multiple  IP with the latest 1 being from 4/3/2025 to 4/25/2025.  PT has Hx of programmatic care with DBT group therapy through [facility] that she no longer attends anymore.    Current Patient Presentation: Pt is presenting calm, cooperative, pleasant and depressed. A&0x4. Offered pt to engage in collaborate safety planning phone call with committment VADIM Núñez. Pt consenting. CM, LMHP and pt discussed how to get pt back to  safely. Pt disclosed that previous rept of pills stock piled in room was incorrect, pt acknowledged she actually brought IB profen with her to the ED and it is located in her purse (securely stored in belongings locker). Pt future oriented in conversation, stating she is looking forward to finishing lego build with  staff and going to the [facility] near her . She rept \"I like the staff at " "the , I just need to open up to them more about how I am feeling.\" Per pt and CM, along with collateral from  staff, pt is at baseline for SI- chronic but no active plan, intent or means.    Presentation Summary: Pt is able to safety plan with writer and  CM who is supporting pt returning to  vs [facility] \"as this reinforces maladaptive coping.\" Pt willingly gave IB profen to this writer from her purse. Pt agreeable to DBT PHP program, signed GEORGI sent over from her CM. Denying acute risk to self or others, denying AV/CH.    Changes Observed Since Initial Assessment: decrease in presenting symptoms    Therapeutic Interventions Provided: Engaged in guided discovery, explored patient's perspectives and helped expand them through socratic dialogue., Coached on coping techniques/relaxation skills to help improve distress tolerance and managing intense emotions., Identified and practiced coping skills., Explored strategies for self-soothing., Discussed TIP (body temperature, intense exercise, PMR).    Current Symptoms: anxious apathy, hoplessness, helplessness anxious        Mental Status Exam   Affect: Appropriate, Flat  Appearance: Appropriate  Attention Span/Concentration: Attentive  Eye Contact: Engaged    Fund of Knowledge: Appropriate   Language /Speech Content: Fluent  Language /Speech Volume: Soft, Normal  Language /Speech Rate/Productions: Normal  Recent Memory: Intact  Remote Memory: Intact  Mood: Normal, Depressed  Orientation to Person: Yes   Orientation to Place: Yes  Orientation to Time of Day: Yes  Orientation to Date: Yes     Situation (Do they understand why they are here?): Yes  Psychomotor Behavior: Normal  Thought Content: Clear  Thought Form: Intact, Goal Directed    Treatment Objective(s) Addressed: rapport building, identifying and practicing coping strategies, processing feelings, identifying an appropriate aftercare plan, assessing safety, identifying treatment goals    Patient Response to " "Interventions: acceptance expressed, verbalizes understanding    Progress Towards Goals:  Patient Reports Symptoms Are: stable  Patient Progress Toward Goals: is making progress  Comment: Able to safety plan, gave medications to [facility] for safe keeping. LMHP disposed of medication. Confirmed with  staff, room has been inspected, no medications found.  Next Step to Work Toward Discharge: follow up on referrals    Case Management: Case Management Included: collaborating with patient's support system  Details on Collaborating with Patient's Support System: Marleny Núñez, pt  CM for MI committment through [employer]. [phone]; Chelo Myers supervsior, [phone]  Summary of Interaction: Spoke with Marleny separtely and together. Marleny supported d/c back to , \"admitting to [facility] would reinforce maladaptive coping. She needs to know were taking her seriously and at the same time not reinforcing this method of seeking care and concern. This is baseline for her SI and if she can safety plan to  then that is a more stable place.\" Engaged in safety planning with pt and LMHP via phone conference. Spoke with Louis  supervisor. Confirmed room search completed and no medication found.  accepting pt back, secure transport requested by the hospital.    C-SSRS Since Last Contact:   1. Wish to be Dead (Since Last Contact): No  2. Non-Specific Active Suicidal Thoughts (Since Last Contact): Yes  3. Active Suicidal Ideation with any Methods (Not Plan) Without Intent to Act (Since Last Contact): No  4. Active Suicidal Ideation with Some Intent to Act, Without Specific Plan (Since Last Contact): No  5. Active Suicidal Ideation with Specific Plan and Intent (Since Last Contact): No     Actual Attempt (Since Last Contact): No  Interrupted Attempts (Since Last Contact): No  Aborted or Self-Interrupted Attempt (Since Last Contact): No  Preparatory Acts or Behavior (Since Last Contact): No     Calculated C-SSRS Risk Score " (Since Last Contact): Low Risk    Plan: Final Disposition / Recommended Care Path: discharge  Plan for Care reviewed with assigned Medical Provider: yes  Plan for Care Team Review: provider, psych associate  Comments: Dr. Rodriguez' Dr. Felipe  Clinical Substantiation: Pt stable for d/c to  setting where there is safety and supervision provided. In consultation with pt committment CM, admission to [facility] unit would reinforce maladaptive coping and pt SI is at baseline. Pt was able to engage in safety planning, disclosed medication and willingly gave it up to [facility] who disposed of it in ED.  accepting pt back. Secure transport provided. At time of d/c pt was stable and not acute risk to self or others, future oriented.    Legal Status: Legal Status: Voluntary/Patient has signed consent for treatment  72 Hour Hold - Date/Time Initiated: 5/15/  Dropped for discharge to .    Session Status: Time session started: 0915  Time session ended: 1110  Session Duration (minutes): 60 minutes  Session Number: 1  Anticipated number of sessions or this episode of care: 1    Session Start Time: 0915  Session Stop Time: 1110  CPT codes: 96033 - Psychotherapy (with patient) - 60 (53+*) min  Time Spent: 60 minutes      CPT code(s) utilized: 52013 - Psychotherapy (with patient) - 60 (53+*) min    Diagnosis:   Patient Active Problem List   Diagnosis Code    Depression F32.A    Suicidal ideation R45.851    Depression with suicidal ideation F32.A, R45.851    MDD (major depressive disorder) F32.9    Suicidal behavior R45.89    Facial cellulitis L03.211    Auditory hallucination R44.0    Psychosis, unspecified psychosis type (H) F29    Morbid obesity (H) E66.01    Chronic insomnia F51.04    Generalized anxiety disorder F41.1    COVID-19 U07.1    Borderline personality disorder (H) F60.3    Depression, unspecified depression type F32.A       Primary Problem This Admission: Active Hospital Problems    *Borderline personality disorder  (H)      Generalized anxiety disorder      MDD (major depressive disorder)        HAIM Arteaga   Licensed Mental Health Professional (LMHP), [facility]  [phone]

## 2025-05-15 NOTE — TELEPHONE ENCOUNTER
5:21 AM - Karina is currently reviewing and will follow up on days with review status.      S: [facility] [place] , DEC  Brandynkarli calling at 3:42 AM about 39 year old/female presenting with SI with plan to overdose.  commands telling her to hurt herself.     B: Pt arrived via EMS. Presenting problem, stressors: MH Diagnosis    Pt affect in ED: Flat  Pt Dx: Major Depressive Disorder and Bipolar Disorder  Previous IPMH hx? Yes: 4/2025  Pt endorses SI, no plan   Hx of suicide attempt? No  Pt denies SIB  Pt denies HI   Pt endorses auditory hallucinations .   Pt RARS Score: 5    Hx of aggression/violence, sexual offenses, legal concerns, Epic care plan? describe: No  Current concerns for aggression this visit? No  Does pt have a history of Civil Commitment? Yes, most recent commitment [facility]  Is Pt their own guardian? Yes    Pt is prescribed medication. Is patient medication compliant? Yes  Pt endorses OP services: Psychiatrist, Therapist, and   CD concerns: None  Acute or chronic medical concerns: No  Does Pt present with specific needs, assistive devices, or exclusionary criteria? None      Pt is ambulatory  Pt is able to perform ADLs independently      A: Pt to be reviewed for [facility] admission. Pt is on a 72HH, initiated 3:45 AM  Preferred placement: Statewide    COVID Symptoms: No  If yes, COVID test required   Utox: Not ordered, intake to request lab    CMP: Not ordered, intake requested lab  CBC: Not ordered, intake requested lab  HCG: Not ordered, intake to request lab     R: Patient cleared and ready for behavioral bed placement: Yes  Pt placed on IP worklist? Yes    Does Patient need a Transfer Center request created? No, Pt is located within [facility] ED, [facility] ED, or [place] ED

## 2025-06-01 ENCOUNTER — MYC REFILL (OUTPATIENT)
Dept: FAMILY MEDICINE | Facility: CLINIC | Age: 40
End: 2025-06-01
Payer: MEDICARE

## 2025-06-01 DIAGNOSIS — R21 RASH: ICD-10-CM

## 2025-06-04 NOTE — TELEPHONE ENCOUNTER
Refill done.    Pt to keep up scheduled visit on 9/3/2025 for further evaluation    Thank you,  Jelena Mackey MD on 6/3/2025

## 2025-06-10 ENCOUNTER — HOSPITAL ENCOUNTER (EMERGENCY)
Facility: CLINIC | Age: 40
Discharge: HOME | End: 2025-06-10
Attending: EMERGENCY MEDICINE | Admitting: EMERGENCY MEDICINE
Payer: MEDICARE

## 2025-06-10 VITALS
HEART RATE: 74 BPM | RESPIRATION RATE: 18 BRPM | OXYGEN SATURATION: 96 % | SYSTOLIC BLOOD PRESSURE: 119 MMHG | TEMPERATURE: 98.5 F | DIASTOLIC BLOOD PRESSURE: 84 MMHG

## 2025-06-10 DIAGNOSIS — R44.0 AUDITORY HALLUCINATION: ICD-10-CM

## 2025-06-10 LAB
AMPHETAMINES UR QL SCN: ABNORMAL
BARBITURATES UR QL SCN: ABNORMAL
BENZODIAZ UR QL SCN: ABNORMAL
BZE UR QL SCN: ABNORMAL
CANNABINOIDS UR QL SCN: ABNORMAL
FENTANYL UR QL: ABNORMAL
OPIATES UR QL SCN: ABNORMAL
PCP QUAL URINE (ROCHE): ABNORMAL

## 2025-06-10 PROCEDURE — 250N000013 HC RX MED GY IP 250 OP 250 PS 637: Performed by: EMERGENCY MEDICINE

## 2025-06-10 PROCEDURE — 99284 EMERGENCY DEPT VISIT MOD MDM: CPT | Performed by: EMERGENCY MEDICINE

## 2025-06-10 PROCEDURE — 80307 DRUG TEST PRSMV CHEM ANLYZR: CPT | Performed by: EMERGENCY MEDICINE

## 2025-06-10 RX ORDER — CLONAZEPAM 1 MG/1
1 TABLET ORAL ONCE
Status: COMPLETED | OUTPATIENT
Start: 2025-06-10 | End: 2025-06-10

## 2025-06-10 RX ADMIN — CLONAZEPAM 1 MG: 1 TABLET ORAL at 18:13

## 2025-06-10 ASSESSMENT — ACTIVITIES OF DAILY LIVING (ADL)
ADLS_ACUITY_SCORE: 58

## 2025-06-10 NOTE — ED PROVIDER NOTES
ED Provider Note  [facility]      History     Chief Complaint   Patient presents with    Self Harm - Deliberate     Pt coming from , called 911 herself. Pt self harm with superficial cuts, using broken plastic bottle to cut per EMS. Pt cooperative and not on hold.  Abrasion noted on L wrist.      HPI  Misty Parham is a 39 year old female who presents with self-harm.  Patient states that she has been cutting her left wrist with a broken piece of plastic for the last couple weeks.  She states that she does this because she hears voices that are telling her to kill herself and that it is time for her to die.  She states that this response she will cut her wrist and that the pain will distract her.  She said the voices are always there but do wax and wane. Denied suicidal ideation; states she does not want to kill herself and cutting is a way to distract herself; she states she is not suicidal and that she does not want to be dead. Otherwise denies acute complaints.  She has been taking her medications as prescribed.    Past Medical History  Past Medical History:   Diagnosis Date    Depressive disorder     Generalized anxiety disorder 5/15/2020     Past Surgical History:   Procedure Laterality Date    CHOLECYSTECTOMY       ARIPiprazole (ABILIFY) 15 MG tablet  citalopram (CELEXA) 10 MG tablet  clonazePAM (KLONOPIN) 1 MG tablet  docusate sodium (COLACE) 100 MG capsule  hydrOXYzine HCl (ATARAX) 50 MG tablet  lamoTRIgine (LAMICTAL) 150 MG tablet  levothyroxine (SYNTHROID/LEVOTHROID) 25 MCG tablet  metFORMIN (GLUCOPHAGE) 1000 MG tablet  miconazole (MICATIN) 2 % external powder  OLANZapine (ZYPREXA) 15 MG tablet  Omega-3 Fish Oil 500 MG capsule  polyethylene glycol (MIRALAX) 17 GM/Dose powder  prazosin (MINIPRESS) 1 MG capsule  traZODone (DESYREL) 150 MG tablet      No Known Allergies  Family History  Family History   Problem Relation Age of Onset    Diabetes Father      Social History   Social  History     Tobacco Use    Smoking status: Never     Passive exposure: Current    Smokeless tobacco: Never   Vaping Use    Vaping status: Never Used   Substance Use Topics    Alcohol use: Not Currently    Drug use: Never      A medically appropriate review of systems was performed with pertinent positives and negatives noted in the HPI, and all other systems negative.    Physical Exam   BP: 118/81  Pulse: 86  Temp: 98.6  F (37  C)  Resp: 18  SpO2: 95 %  Physical Exam  Constitutional:       General: She is not in acute distress.     Appearance: She is not toxic-appearing.   HENT:      Head: Normocephalic and atraumatic.   Cardiovascular:      Rate and Rhythm: Normal rate.   Pulmonary:      Effort: Pulmonary effort is normal. No respiratory distress.      Breath sounds: No wheezing.   Skin:     General: Skin is warm and dry.      Comments: To the left wrist there are some superficial linear abrasions without significant surrounding erythema, no drainage, no excess warmth, no tenderness   Neurological:      General: No focal deficit present.      Mental Status: She is alert and oriented to person, place, and time.   Psychiatric:         Attention and Perception: She perceives auditory hallucinations.         Mood and Affect: Affect is flat.         Thought Content: Thought content does not include suicidal ideation.      Comments: Self-injurious behaviors           ED Course, Procedures, & Data      Procedures                Results for orders placed or performed during the hospital encounter of 06/10/25   Urine Drug Screen Panel     Status: Abnormal   Result Value Ref Range    Amphetamines Urine Screen Negative Screen Negative    Barbituates Urine Screen Negative Screen Negative    Benzodiazepine Urine Screen Positive (A) Screen Negative    Cannabinoids Urine Screen Negative Screen Negative    Cocaine Urine Screen Negative Screen Negative    Fentanyl Qual Urine Screen Negative Screen Negative    Opiates Urine Screen  Negative Screen Negative    PCP Urine Screen Negative Screen Negative   Urine Drug Screen     Status: Abnormal    Narrative    The following orders were created for panel order Urine Drug Screen.  Procedure                               Abnormality         Status                     ---------                               -----------         ------                     Urine Drug Screen Panel[[id number]]     Abnormal            Final result                 Please view results for these tests on the individual orders.     Medications   clonazePAM (klonoPIN) tablet 1 mg (1 mg Oral $Given 6/10/25 2089)     Labs Ordered and Resulted from Time of ED Arrival to Time of ED Departure   URINE DRUG SCREEN PANEL - Abnormal       Result Value    Amphetamines Urine Screen Negative      Barbituates Urine Screen Negative      Benzodiazepine Urine Screen Positive (*)     Cannabinoids Urine Screen Negative      Cocaine Urine Screen Negative      Fentanyl Qual Urine Screen Negative      Opiates Urine Screen Negative      PCP Urine Screen Negative       No orders to display          Critical care was not performed.     Medical Decision Making  The patient's presentation was of moderate complexity (a chronic illness mild to moderate exacerbation, progression, or side effect of treatment).    The patient's evaluation involved:  review of external note(s) from 3+ sources (ED notes, and all assessment notes, nursing notes, progress note)  review of 3+ test result(s) ordered prior to this encounter (CBC, CMP, UDS)  ordering and/or review of 1 test(s) in this encounter (see separate area of note for details)  discussion of management or test interpretation with another health professional (see separate area of note for details)    The patient's management necessitated moderate risk (prescription drug management including medications given in the ED).    Assessment & Plan    This is a 39-year-old female with history of depression here with  self-injurious behaviors.  On arrival vitals were reassuring.  She reported auditory hallucinations telling her to hurt herself however stated she do not want to die and was not feeling suicidal.  She did have some cutting behaviors and had some superficial wounds to the left forearm but no deep wounds, wounds requiring any repair.  She does have some chronic depression and hallucinations.  DEC evaluated the patient, was able to obtain some collateral history as well.  This does appear to be her baseline.  Per the group home she will act out in similar ways in the past.  She was able to safety plan engage with the DEC . DEC recommended against admission. Group home did accept her back.    I have reviewed the nursing notes. I have reviewed the findings, diagnosis, plan and need for follow up with the patient.    New Prescriptions    No medications on file       Final diagnoses:   Auditory hallucination       Drew FLORES [facility] EMERGENCY DEPARTMENT  6/10/2025     Drew Gutierrez MD  06/10/25 2038

## 2025-06-10 NOTE — ED NOTES
Bed: ED11  Expected date: 6/10/25  Expected time: 4:59 PM  Means of arrival: Ambulance  Comments:  [facility] 40yo female, self harm cutting wrist, skuperficial

## 2025-06-11 ENCOUNTER — TELEPHONE (OUTPATIENT)
Dept: BEHAVIORAL HEALTH | Facility: CLINIC | Age: 40
End: 2025-06-11
Payer: MEDICARE

## 2025-06-11 ENCOUNTER — PATIENT OUTREACH (OUTPATIENT)
Dept: CARE COORDINATION | Facility: CLINIC | Age: 40
End: 2025-06-11
Payer: MEDICARE

## 2025-06-11 NOTE — PROGRESS NOTES
Connected Care Resource Center    Background: Primary Care-Care Coordination initial outreach identified per system criteria and reviewed by [facility] Care Resource Center team.    Assessment: Upon chart review, CCR Team member will not proceed with patient outreach related to this episode of Primary Care-Care Coordination program due to reason below:    (1) Patient has discharged to a Group Home where patient is receiving on-site support with their daily cares, including support with ED follow up plan. (2) [facility] Care Coordination will defer follow-up outreach to specialty care team who are already closely following patient.    Plan: Primary Care-Care Coordination episode addressed appropriately per reason noted above.      Lorrie Squires MA  Connected Care Resource Center, [facility]    *Connected Care Resource Team does NOT follow patient ongoing. Referrals are identified based on internal discharge reports and the outreach is to ensure patient has an understanding of their discharge instructions.   Take potassium supplement as directed for 7 days. Naproen for pain/inflammation, take with food. Zofran for nausea/vomiting as needed. Take bactrim DS for infection. Hydrate with plenty of fluids.     If symptoms change/worsen, return to ER.

## 2025-06-11 NOTE — CONSULTS
Diagnostic Evaluation Consultation  Crisis Assessment    Patient Name: Misty Parham  Age:  39 year old  Legal Sex: female  Gender Identity: female  Pronouns:      Race: White  Ethnicity: Not  or   Language: English      Patient was assessed: Virtual: iPad   Crisis Assessment Start Date: 06/10/25  Crisis Assessment Start Time: 1841  Crisis Assessment Stop Time: 1900  Patient location: [facility] Department                             ED11    Referral Data and Chief Complaint  Misty Parham presents to the ED  . Patient is presenting to the ED for the following concerns:  . Factors that make the mental health crisis life threatening or complex are: Patient presents to the emergency room via EMS due to auditory hallucinations and self-harm.  Patient reports informing her provider today that she had been experiencing voices telling her to kill herself.  Patient reports that she has been self harming in the form of superficial cuts on her arm in order to help quiet the voices.  Patient denies suicidal ideation and has no plans or intent to end her life.  Patient reports that she has been engaging in self-harm as a way of pleasing the voices that she hears..      Informed Consent and Assessment Methods  Explained the crisis assessment process, including applicable information disclosures and limits to confidentiality, assessed understanding of the process, and obtained consent to proceed with the assessment.  Assessment methods included conducting a formal interview with patient, review of medical records, collaboration with medical staff, and obtaining relevant collateral information from family and community providers when available.  : done     History of the Crisis   PT has Hx of past diagnoses of MDD, IVY and BPD.  PT has Hx of SI with 2 prior attempts via cutting and overdosing.  PT also has Hx for NSSI via cutting as well as command AH for self-harm and suicide.  PT denies Hx  with HI, VH, and substance use.  PT is currently seeing a therapist and a psychiatric medication management through [facility].  PT currently has a CADI waiver  through [facility].  PT also is currently on civil commitment that started in 3/13 2025 and has Hx of multiple previous commitments.  PT also has a civil commitment  as well. PT is currently living at  [facility] since last September  PT has Hx of multiple ED visits for similar concerns as well as multiple  IP with the latest 1 being from 4/3/2025 to 4/25/2025.  PT has Hx of programmatic care with DBT group therapy through [facility] and reports that she has an intake for a new program this week.    Brief Psychosocial History  Family:   , Children yes  Support System:  Parent(s), Sibling(s)  Employment Status:  unemployed  Source of Income:  unable to assess  Financial Environmental Concerns:  none  Current Hobbies:  family functions  Barriers in Personal Life:  mental health concerns    Significant Clinical History  Current Anxiety Symptoms:  anxious, excessive worry  Current Depression/Trauma:  sadness, hopelessness, helplessness, withdrawl/isolation, negativistic, crying or feels like crying, thoughts of death/suicide, irritable  Current Somatic Symptoms:  sweating, flushing, shaking  Current Psychosis/Thought Disturbance:  auditory hallucinations  Current Eating Symptoms:  loss of appetite  Chemical Use History:      Past diagnosis:  Anxiety Disorder, Depression, Personality Disorder  Family history:  No known history of mental health or chemical health concerns  Past treatment:  Individual therapy, Psychiatric Medication Management, Case management, Civil Commitment, Other Holistic Treatment, Inpatient Hospitalization, Supportive Living Environment (group home, retirement house, etc)  Details of most recent treatment:     Other relevant history:       Have there been any  medication changes in the past two weeks:  yes, please comment  Increasing on abilify    Is the patient compliant with medications:  yes        Collateral Information  Is there collateral information: Yes     Collateral information name, relationship, phone number:  Chelo Mackenzie, [phone]    What happened today: She was in my office and she confessed to her provider that she had been cutting.  Her provider said that she needed to come to the emergency room so she called 911.     What is different about patient's functioning: PT staff reports that this is baseline behavior for PT. They report that pt has been taking her medications.     What do you think the patient needs:      Has patient made comments about wanting to kill themselves/others: yes    If d/c is recommended, can they take part in safety/aftercare planning:  yes    Additional collateral information:  Staff has removed all plastic spoons from the home so that she does not have anything to cut with.     Risk Assessment  [place] Suicide Severity Rating Scale Full Clinical Version:  Suicidal Ideation  Q1 Wish to be Dead (Lifetime): Yes  Q2 Non-Specific Active Suicidal Thoughts (Lifetime): Yes  3. Active Suicidal Ideation with any Methods (Not Plan) Without Intent to Act (Lifetime): Yes  4. Active Suicidal Ideation with Some Intent to Act, Without Specific Plan (Lifetime): Yes  5. Active Suicidal Ideation with Specific Plan and Intent (Lifetime): Yes  Q6 Suicide Behavior (Lifetime): yes  Intensity of Ideation (Lifetime)  Most Severe Ideation Rating (Lifetime): 4  Frequency (Lifetime): Daily or almost daily  Duration (Lifetime): More than 8 hours/persistent or continuous  Controllability (Lifetime): Can control thoughts with some difficulty  Deterrents (Lifetime): Uncertain that deterrents stopped you  Reasons for Ideation (Lifetime): Equally to get attention, revenge, or a reaction from others and to end/stop the pain  Suicidal Behavior  (Lifetime)  Actual Attempt (Lifetime): Yes  Total Number of Actual Attempts (Lifetime): 2  Actual Attempt Description (Lifetime): Overdosing and cutting  Has subject engaged in non-suicidal self-injurious behavior? (Lifetime): Yes  Interrupted Attempts (Lifetime): No  Aborted or Self-Interrupted Attempt (Lifetime): No  Preparatory Acts or Behavior (Lifetime): Yes  Total Number of Preparatory Acts (Lifetime): 3    [place] Suicide Severity Rating Scale Recent:   Suicidal Ideation (Recent)  Q1 Wished to be Dead (Past Month): no  Q2 Suicidal Thoughts (Past Month): no  Level of Risk per Screen: no risks indicated     Suicidal Behavior (Recent)  Actual Attempt (Past 3 Months): No  Has subject engaged in non-suicidal self-injurious behavior? (Past 3 Months): Yes  Interrupted Attempts (Past 3 Months): No  Aborted or Self-Interrupted Attempt (Past 3 Months): No  Preparatory Acts or Behavior (Past 3 Months): No    Environmental or Psychosocial Events: challenging interpersonal relationships, helplessness/hopelessness, other life stressors  Protective Factors: Protective Factors: lives in a responsibly safe and stable environment, good treatment engagement, able to access care without barriers, supportive ongoing medical and mental health care relationships    Does the patient have thoughts of harming others? Feels Like Hurting Others: no  Previous Attempt to Hurt Others: no  Is the patient engaging in sexually inappropriate behavior?: no  Does Patient have a known history of aggressive behavior: No    Is the patient engaging in sexually inappropriate behavior?  no        Mental Status Exam   Affect: Flat  Appearance: Appropriate  Attention Span/Concentration: Attentive  Eye Contact: Engaged    Fund of Knowledge: Appropriate   Language /Speech Content: Fluent  Language /Speech Volume: Soft, Normal  Language /Speech Rate/Productions: Normal  Recent Memory: Intact  Remote Memory: Intact  Mood: Sad  Orientation to Person: Yes    Orientation to Place: Yes  Orientation to Time of Day: Yes  Orientation to Date: Yes     Situation (Do they understand why they are here?): Yes  Psychomotor Behavior: Normal  Thought Content: Clear  Thought Form: Intact     Mini-Cog Assessment  Number of Words Recalled:    Clock-Drawing Test:     Three Item Recall:    Mini-Cog Total Score:       Medication  Psychotropic medications:   Medication Orders - Psychiatric (From admission, onward)      None             Current Care Team  Patient Care Team:  [facility] Cinthya Haywood as PCP - Jose Juan Gil [facility] as Pharmacist (Pharmacist)  Jelena Mackey MD as Assigned PCP  Dinesh Renee OD as Assigned Surgical Provider    Diagnosis  Patient Active Problem List   Diagnosis Code    Depression F32.A    Suicidal ideation R45.851    Depression with suicidal ideation F32.A, R45.851    MDD (major depressive disorder) F32.9    Suicidal behavior R45.89    Facial cellulitis L03.211    Auditory hallucination R44.0    Psychosis, unspecified psychosis type (H) F29    Morbid obesity (H) E66.01    Chronic insomnia F51.04    Generalized anxiety disorder F41.1    COVID-19 U07.1    Borderline personality disorder (H) F60.3    Depression, unspecified depression type F32.A       Primary Problem This Admission  Active Hospital Problems    *Auditory hallucination        Clinical Summary and Substantiation of Recommendations   Clinical Substantiation:  Patient's current presentation is at baseline level.  Patient frequently comes to the emergency room due to maladaptive coping.  Patient currently denies suicidal ideation and is able to appropriately engage in safety planning at this time.  Patient has the support of her group home staff to return home.  Patient has follow-up with her therapist later this week and will complete an intake for a day treatment programming on Friday.  With these increased supports and 24-hour staffing patient is able to return to her group  home tonight.    Goals for crisis stabilization:  Safety planning    Next steps for Care Team:  Discharge to group home    Treatment Objectives Addressed:  identifying an appropriate aftercare plan, rapport building, processing feelings, assessing safety    Therapeutic Interventions:  Engaged in cognitive restructuring/ reframing, looked at common cognitive distortions and challenged negative thoughts., Engaged in guided discovery, explored patient's perspectives and helped expand them through socratic dialogue.    Has a specific means been identified for suicidal/homicide actions:      If yes, describe:       Explain action steps toward mitigation:       Document completion of mitigation actions:       The follow up action still needed prior to discharge:       Patient coping skills attempted to reduce the crisis:  Talking with thearpist    Disposition  Recommended referrals: Individual Therapy, Medication Management        Reviewed case and recommendations with attending provider. Attending Name: Dr. Ritter       Attending concurs with disposition: yes       Patient and/or validated legal guardian concurs with disposition:   yes       Final disposition:  discharge                            Legal status: Voluntary/Patient has signed consent for treatment                                                                                                                                 Reviewed court records: yes       Assessment Details   Total duration spent with the patient: 18 min     CPT code(s) utilized: 25739 - Psychotherapy (with patient) - 30 (16-37*) min    Ana Lilia Cr [facility], Psychotherapist  [facility] - Triage & Transition Services  Callback: [phone]

## 2025-07-16 NOTE — PROGRESS NOTES
"Triage & Transition Services, Extended Care     Triage and Transition Services Extended Care Reassessment     Patient: Misty goes by \"Misty,\" uses she/her pronouns  Date of Service: April 2, 2025  Site of Service: [facility] Emergency Dept                             [id number]    Patient was seen: Yes  Mode of Assessment: In person     Reason for Reassessment: Depression, NSSIBs and SI    History of Patient's Original Emergency Room Encounter: Misty Parham presents to the ED via EMS for the following concerns: Depression and Suicidal ideation. Pt presents to the ED after attending her DBT group, where her DBT therapist called EMS to bring pt into the ED due to increased suicidal ideations with a plan to cut her wrists. Pt has superficial cuts on both wrists that she reports were made with a butter knife as that's all that she has access to in her current group home. Pt reports that she has been planning on how to obtain a  and reports that she has been searching for a knife and is planning on saving up money to purchase a . Pt reports passive SI at baseline but reports that 3 weeks ago her SI increased due to feeling isolated in her group home, missing her children and ongoing issues with depression with no relief from symptoms. Pt appears flat throughout assessment. Pt reports to nursing staff that her \"angels are getting louder\" and telling her to end her life. Pt denies any AH/VH with writer. Pt reports that her SI is daily and that she has little ability to control thoughts. Per collateral at pt's group home, pt has been making frequent comments about wanting to complete suicide which have been increasing recently. Pt is currently on a mental health commitment through [facility] and has a history of multiple past MH commitments. Pt was last seen on [facility] in January of 2024 and a petition for commitment was filed. The state supported the commitment and pt was placed back on " "commitment. Pt reports that her commitment was just renewed and she continues to remain on a commitment. A note from 2/9/2024 in pt's chart states \"Patient has very severe mental illness with many past psychiatric hospitalizations, multiple courses of ECT including a [facility] order with commitments in the past.\" Pt reports multiple hospitalizations in the past, attending an IRTS facility for the past year and reports multiple attempted overdoses in the past. Pt reports that she does not currently have access to her medication in her current group home and thought of a different method to harm herself. Pt reports engaging in self harm in the past and reports that she just recently began cutting herself again. Pt reports a MH history of MDD, anxiety and Borderline Personality Disorder.     Current Patient Presentation: Pt was in her recliner when this writer approached and invited her to meet in a consult room for reassessment. Pt readily met with this writer. Pt reported, \"I wanted to get a  to cut myself yesterday. I am still feeling the same way today.\" Pt endorsed NSSIB and SI with a plan and intent of cutting herself stating, \"I feel empty. I deserve pain but I feel selfish for my kids because they won't understand.\" Pt endorsed command AH stating, \"I have angels telling me its okay to die and become an anmol or it could be satan. I used a butter knife to cut myself yesterday\" showing this writer the cuts on her left wrist--[facility], \"the angels told me to cut myself. I am hearing them today. I am feeling tired--the angels get stronger when I am tired.\" Pt reported not having the \"money\" to purchase a \"\" but would if she had stating, \"I used one before in high school and it relieved a lot of stress.\" Pt reported picking at the skin behind her right ear as a form of NSSIB stating that the \"voices\" were telling her to do so. Pt was agreeable to having her " "assigned nurse for the day take a look at her ear--nursing was notified. This writer and pt talked about coping skills with pt identifying \"chewing ice\" and listening to music through headphones as helpful coping skills she could utilize while at [facility]. Pt did not endorse HI or VH. Pt reported the severity of her presenting concerns to have started \"3 weeks ago\" after an argument between her group home staff and another resident who \"had a messy room\" despite staff telling her to \"clean it\" stating, \"she didn't want to clean her room and was in my room. Staff told me to kick her out and were putting me in the middle. I didn't want to kick her out because we are friends.\" Treatment options were discussed with the pt who was agreeable to IP mental health care to support safety and symptom stabilization, with [place]. Pt signed ROIs for her group home, --Marleny Núñez at [phone] and DBT Therapist--Cyn Rod at [facility].    Presentation Summary: Pt reported, \"I wanted to get a  to cut myself yesterday. I am still feeling the same way today.\" Pt endorsed NSSIB and SI with a plan and intent of cutting herself stating, \"I feel empty. I deserve pain but I feel selfish for my kids because they won't understand.\" Pt endorsed command AH stating, \"I have angels telling me its okay to die and become an anmol or it could be satan. I used a butter knife to cut myself yesterday\" showing this writer the cuts on her left wrist--pt engaged in self harm while at [facility], \"the angels told me to cut myself. I am hearing them today. I am feeling tired--the angels get stronger when I am tired.\" Pt reported not having the \"money\" to purchase a \"\" but would if she had stating, \"I used one before in high school and it relieved a lot of stress.\" Pt reported picking at the skin behind her right ear as a form of NSSIB stating that the \"voices\" " "were telling her to do so. Pt was agreeable to having her assigned nurse for the day take a look at her ear--nursing was notified. This writer and pt talked about coping skills with pt identifying \"chewing ice\" and listening to music through headphones as helpful coping skills she could utilize while at [facility]. Pt did not endorse HI or VH. Pt reported the severity of her presenting concerns to have started \"3 weeks ago\" after an argument between her group home staff and another resident who \"had a messy room\" despite staff telling her to \"clean it\" stating, \"she didn't want to clean her room and was in my room. Staff told me to kick her out and were putting me in the middle. I didn't want to kick her out because we are friends.\" Treatment options were discussed with the pt who was agreeable to IP mental health care to support safety and symptom stabilization, with a [place]. Pt signed ROIs for her group home, --Marleny Núñez at [phone] and DBT Therapist--Cyn Rod at [facility].    Therapeutic Interventions Provided:  Engaged in guided discovery, explored patient's perspectives and helped expand them through socratic dialogue., Coached on coping techniques/relaxation skills to help improve distress tolerance and managing intense emotions., Identified coping skills., Explored strategies for self-soothing., Discussed TIP (body temperature, intense exercise, PMR)., Supportive listening    Current Symptoms: Apathy, thoughts of death/suicide, hopelessness, helplessness, auditory hallucinations      Mental Status Exam   Affect: Flat, Blunted  Appearance: Appropriate  Attention Span/Concentration: Attentive  Eye Contact: Engaged    Fund of Knowledge: Appropriate  Language /Speech Content: Fluent  Language /Speech Volume: Soft  Language /Speech Rate/Productions: Slow  Recent Memory: Intact  Remote Memory: Intact  Mood: Apathetic, " "Depressed  Orientation to Person: Yes  Orientation to Place: Yes  Orientation to Time of Day: Yes  Orientation to Date: Yes    Situation (Do they understand why they are here?): Yes  Psychomotor Behavior: Underactive  Thought Content: Suicidal, Hallucinations  Thought Form: Intact    Treatment Objective(s) Addressed: Rapport building., identifying coping strategies,. processing feelings., identifying an appropriate aftercare plan., assessing safety., identifying treatment goals    Patient Response to Interventions: Acceptance expressed, verbalizes understanding    Progress Towards Goals:    Patient Reports Symptoms Are: Ongoing  Patient Progress Toward Goals: Is not making progress  Next Step to Work Toward Discharge: Symptom stabilization, patient ability to engage in safety planning, awaiting acceptance at community level of care    Case Management:   Case Management Included: Collaborating with patient's support system  Details on Collaborating with Patient's Support System: This writer took a call from Marleny Núñez ([phone]), the pt's  and gathered collateral information from her. This writer also took a call from Cyn Rod--the pt's DBT therapist and gathered collateral information from her.  After the pt signed ROIs for both, this writer called them back with updates on the pt's plan of care.    Summary of Interaction: Cyn Rod reported the pt \"was having strong urges to engage in pretty dangerous self harm using a s  and would be able to get away with it from her group home. We did safety planning last week due to her endorsing self harm and safety concerns. I wasn't feeling comfortable sending her home with the urges so I called 911.\" Cyn mentioned the pt had \"waxed and waned in the DBT program. 3 weeks ago the symptoms increased. The precipitating factor was a conflict with a housemate. She is in our group for adults with intellectual disabilities.\" Cyn did not " "report any pt concerns about alcohol/drug use. Cyn noted the pt had made comments about wanting to kill themselves with a plan \"to find a  to use at the group home.\" In responding to the question about what might be helpful for the pt in terms of mental health care, Cyn reported, \"PHP.\"      Marleny Núñez reported the pt \"has a lot of supports in place and lives in a group home through [facility]. She has a long term therapist and psychiatrist. Her commitment is up for renewal due to the severity of her symptoms--its expiring in May but there is already a hearing date for next week set.\"    C-SSRS Since Last Contact:   1. Wish to be Dead (Since Last Contact): Yes  2. Non-Specific Active Suicidal Thoughts (Since Last Contact): Yes  3. Active Suicidal Ideation with any Methods (Not Plan) Without Intent to Act (Since Last Contact): Yes  4. Active Suicidal Ideation with Some Intent to Act, Without Specific Plan (Since Last Contact): Yes  5. Active Suicidal Ideation with Specific Plan and Intent (Since Last Contact): Yes  Active Suicidal Ideation with Specific Plan and Intent Description (Since Last Contact): Pt endorsed NSSIB and SI with a plan and intent of cutting herself.     Actual Attempt (Since Last Contact): No  Has subject engaged in non-suicidal self-injurious behavior? (Since Last Contact): Yes  Interrupted Attempts (Since Last Contact): No  Aborted or Self-Interrupted Attempt (Since Last Contact): No  Preparatory Acts or Behavior (Since Last Contact): No  Suicide (Since Last Contact): No     Calculated C-SSRS Risk Score (Since Last Contact): High Risk    Plan:  Final Disposition / Recommended Care Path: Inpatient mental health  Plan for Care reviewed with assigned Medical Provider: Yes  Plan for Care Team Review: Provider, RN  Comments: Baron Laughlin MD  Patient and/or validated legal guardian concurs: Yes  Clinical Substantiation: The pt was found to have a psychiatric condition that would benefit " from inpatient mental health care for further treatment and stabilization of her mental health symptoms. Pt symptoms are ongoing including NSSIBs, SI with a plan and intent and, command auditory hallucinations. Attempts at managing mental health symptoms and maintaining safety at a lower level of care have been unsuccessful. Pt s current mental health symptoms are requiring a secure setting and further intervention. If pt's symptoms improve and /or pt is able to safety plan it may be most beneficial to pursue a less restrictive alternative.    Legal Status:  Voluntary/Patient has signed consent for treatment    Session Status:   Time session started: 1025  Time session ended: 1050  Session Duration (minutes): 25 minutes  Session Number: 1  Anticipated number of sessions or this episode of care: 1    Time Spent: 25 minutes    Session Start Time: 1025  Session Stop Time: 1050  CPT codes: 04561 - Psychotherapy (with patient) - 30 (16-37*) min  Time Spent: 25 minutes      CPT code(s) utilized: 61897 - Psychotherapy (with patient) - 30 (16-37*) min    Diagnosis:   Patient Active Problem List   Diagnosis Code    Depression F32.A    Suicidal ideation R45.851    Depression with suicidal ideation F32.A, R45.851    Major depression F32.9    Suicidal behavior R45.89    Facial cellulitis L03.211    Auditory hallucination R44.0    Psychosis, unspecified psychosis type (H) F29    Morbid obesity (H) E66.01    Chronic insomnia F51.04    Generalized anxiety disorder F41.1    COVID-19 U07.1    Borderline personality disorder (H) F60.3    Depression, unspecified depression type F32.A   Primary Problem This Admission:   Active Hospital Problems    Depression, unspecified depression type                        ANURAG Chu   Licensed Mental Health Professional (LMHP), [facility]  [phone]    Quality 226: Preventive Care And Screening: Tobacco Use: Screening And Cessation Intervention: Patient screened for tobacco use and is an ex/non-smoker Detail Level: Detailed Quality 130: Documentation Of Current Medications In The Medical Record: Current Medications Documented Quality 431: Preventive Care And Screening: Unhealthy Alcohol Use - Screening: Patient not identified as an unhealthy alcohol user when screened for unhealthy alcohol use using a systematic screening method

## (undated) RX ORDER — ONDANSETRON 2 MG/ML
INJECTION INTRAMUSCULAR; INTRAVENOUS
Status: DISPENSED
Start: 2019-01-21

## (undated) RX ORDER — KETOROLAC TROMETHAMINE 30 MG/ML
INJECTION, SOLUTION INTRAMUSCULAR; INTRAVENOUS
Status: DISPENSED
Start: 2019-06-24

## (undated) RX ORDER — KETOROLAC TROMETHAMINE 30 MG/ML
INJECTION, SOLUTION INTRAMUSCULAR; INTRAVENOUS
Status: DISPENSED
Start: 2020-01-15

## (undated) RX ORDER — KETOROLAC TROMETHAMINE 30 MG/ML
INJECTION, SOLUTION INTRAMUSCULAR; INTRAVENOUS
Status: DISPENSED
Start: 2019-07-03

## (undated) RX ORDER — KETOROLAC TROMETHAMINE 30 MG/ML
INJECTION, SOLUTION INTRAMUSCULAR; INTRAVENOUS
Status: DISPENSED
Start: 2019-10-18

## (undated) RX ORDER — KETOROLAC TROMETHAMINE 30 MG/ML
INJECTION, SOLUTION INTRAMUSCULAR; INTRAVENOUS
Status: DISPENSED
Start: 2019-01-11

## (undated) RX ORDER — KETOROLAC TROMETHAMINE 30 MG/ML
INJECTION, SOLUTION INTRAMUSCULAR; INTRAVENOUS
Status: DISPENSED
Start: 2019-10-21

## (undated) RX ORDER — KETOROLAC TROMETHAMINE 30 MG/ML
INJECTION, SOLUTION INTRAMUSCULAR; INTRAVENOUS
Status: DISPENSED
Start: 2020-03-25

## (undated) RX ORDER — KETOROLAC TROMETHAMINE 30 MG/ML
INJECTION, SOLUTION INTRAMUSCULAR; INTRAVENOUS
Status: DISPENSED
Start: 2019-06-28

## (undated) RX ORDER — KETOROLAC TROMETHAMINE 15 MG/ML
INJECTION, SOLUTION INTRAMUSCULAR; INTRAVENOUS
Status: DISPENSED
Start: 2020-03-18

## (undated) RX ORDER — KETOROLAC TROMETHAMINE 30 MG/ML
INJECTION, SOLUTION INTRAMUSCULAR; INTRAVENOUS
Status: DISPENSED
Start: 2019-10-11

## (undated) RX ORDER — KETOROLAC TROMETHAMINE 30 MG/ML
INJECTION, SOLUTION INTRAMUSCULAR; INTRAVENOUS
Status: DISPENSED
Start: 2019-06-21

## (undated) RX ORDER — KETOROLAC TROMETHAMINE 30 MG/ML
INJECTION, SOLUTION INTRAMUSCULAR; INTRAVENOUS
Status: DISPENSED
Start: 2019-01-16

## (undated) RX ORDER — KETOROLAC TROMETHAMINE 30 MG/ML
INJECTION, SOLUTION INTRAMUSCULAR; INTRAVENOUS
Status: DISPENSED
Start: 2019-01-18

## (undated) RX ORDER — KETOROLAC TROMETHAMINE 30 MG/ML
INJECTION, SOLUTION INTRAMUSCULAR; INTRAVENOUS
Status: DISPENSED
Start: 2020-01-10

## (undated) RX ORDER — KETOROLAC TROMETHAMINE 30 MG/ML
INJECTION, SOLUTION INTRAMUSCULAR; INTRAVENOUS
Status: DISPENSED
Start: 2020-01-06

## (undated) RX ORDER — KETOROLAC TROMETHAMINE 30 MG/ML
INJECTION, SOLUTION INTRAMUSCULAR; INTRAVENOUS
Status: DISPENSED
Start: 2020-01-13

## (undated) RX ORDER — KETOROLAC TROMETHAMINE 30 MG/ML
INJECTION, SOLUTION INTRAMUSCULAR; INTRAVENOUS
Status: DISPENSED
Start: 2020-03-23

## (undated) RX ORDER — KETOROLAC TROMETHAMINE 30 MG/ML
INJECTION, SOLUTION INTRAMUSCULAR; INTRAVENOUS
Status: DISPENSED
Start: 2019-07-01

## (undated) RX ORDER — KETOROLAC TROMETHAMINE 30 MG/ML
INJECTION, SOLUTION INTRAMUSCULAR; INTRAVENOUS
Status: DISPENSED
Start: 2019-10-16

## (undated) RX ORDER — KETOROLAC TROMETHAMINE 30 MG/ML
INJECTION, SOLUTION INTRAMUSCULAR; INTRAVENOUS
Status: DISPENSED
Start: 2019-10-14

## (undated) RX ORDER — KETOROLAC TROMETHAMINE 30 MG/ML
INJECTION, SOLUTION INTRAMUSCULAR; INTRAVENOUS
Status: DISPENSED
Start: 2019-10-09

## (undated) RX ORDER — KETOROLAC TROMETHAMINE 30 MG/ML
INJECTION, SOLUTION INTRAMUSCULAR; INTRAVENOUS
Status: DISPENSED
Start: 2019-01-14

## (undated) RX ORDER — KETOROLAC TROMETHAMINE 30 MG/ML
INJECTION, SOLUTION INTRAMUSCULAR; INTRAVENOUS
Status: DISPENSED
Start: 2019-06-26

## (undated) RX ORDER — KETOROLAC TROMETHAMINE 30 MG/ML
INJECTION, SOLUTION INTRAMUSCULAR; INTRAVENOUS
Status: DISPENSED
Start: 2020-01-08

## (undated) RX ORDER — KETOROLAC TROMETHAMINE 30 MG/ML
INJECTION, SOLUTION INTRAMUSCULAR; INTRAVENOUS
Status: DISPENSED
Start: 2019-01-21